# Patient Record
Sex: FEMALE | Race: BLACK OR AFRICAN AMERICAN | NOT HISPANIC OR LATINO | Employment: OTHER | URBAN - METROPOLITAN AREA
[De-identification: names, ages, dates, MRNs, and addresses within clinical notes are randomized per-mention and may not be internally consistent; named-entity substitution may affect disease eponyms.]

---

## 2017-01-04 ENCOUNTER — CLINICAL SUPPORT (OUTPATIENT)
Dept: REHABILITATION | Facility: OTHER | Age: 77
End: 2017-01-04
Attending: PHYSICAL MEDICINE & REHABILITATION
Payer: MEDICARE

## 2017-01-04 DIAGNOSIS — M51.36 DDD (DEGENERATIVE DISC DISEASE), LUMBAR: Primary | ICD-10-CM

## 2017-01-04 PROCEDURE — 97110 THERAPEUTIC EXERCISES: CPT | Performed by: PHYSICAL THERAPIST

## 2017-01-04 PROCEDURE — G8978 MOBILITY CURRENT STATUS: HCPCS | Mod: CK | Performed by: PHYSICAL THERAPIST

## 2017-01-04 PROCEDURE — G8979 MOBILITY GOAL STATUS: HCPCS | Mod: CJ | Performed by: PHYSICAL THERAPIST

## 2017-01-04 NOTE — PROGRESS NOTES
"[facility]   LUMBAR TREATMENT NOTE     Visit #:10 [facility] IM testing  Date: 2017     Time: 5250-7160 am  Precautions: L TKA (), Hx CA L face, CTS Mynor, gout    Pattern: Pattern 1 , no preference    Eval date: 10/25/16  Reassessment due: 16, done 16  Reassessment due: 16, done 17  Next reassessment due: 17    POC signed....10/25/16  Next POC due...17    PT/PTA face to face conference: 10/27/16 done  Conference due: 16      Subjective     Pt presents today with c/o lower back pain 3-4/10 prior to session. She is doing better after a bad week before the holidays after attending a  and having to walk a lot. Pt reported improved symptoms with repeated standing and prone extensions. She is compliant with her HEP, but not icing at home. Pt bought a theraball.  Good tolerance to [facility] Testing today.    Recent or major surgery: none  Imaging: see EPIC  Work: retired  Leisure: walking, working in yard (lives alone but has neighbors and nieces to assist if needed)         History       Pattern of pain questions: Pattern 1    1.  Where is your pain the worst? back  2.  Is your pain constant or intermittent? Constant, varies in intensity  3.  Does bending forward make your typical pain worse? No, just increases "stretch"  4.  Since the start of your back pain, has there been a change in your bowel or bladder? no  5.  What can't you do now that you use to be able to do? Walking, HHCs, yardwork, working out      Objective     No environmental, cultural, spiritual, developmental or education needs expressed or noted    POSTURE at eval:  Sitting: slump sitting with FHP, rounded shoulders and increased C-curve in LSP  Standing: increased thoracic kyphosis, slight decrease in Lumbar lordosis, no shift    Correction of posture: slimline  Relevant: yes        MOVEMENT LOSS 17:  Flexion:  WFL (fingertips to near ankle) IMP  Extension: limited  "   Pain: no,   Sidebending RIGHT: WFL      Pain: no  Sidebending LEFT:  WFL     Pain: no  Rotation RIGHT: WFL  Pain: no  Rotation LEFT: WFL  Pain: no    Repeated standing extensions: improved symptoms  Repeated prone extensions: slight pain, improved symptoms after decreasing ROM    Baseline IM Testing Results: 10/25/16  ROM: 0-33  Max Peak Torque: 111  Min Peak Torque: 21   Flex/ext ratio: 5.29:1    [facility] Testing Results: 1/4/17  ROM: 0-36 deg  MAx Peak Torque: 204 ftlbs  Min Peak Torque: 73 ftlbs  Flex Ext ratio:2.7:1 (imp)  Avg Change Sum of Forces: 171%       Treatment     Pt was instructed in and performed the following:  Cardiovascular exercise and therapeutic exercise to improve posture, lumbar spine and supporting musculature ROM, strength, and muscular endurance as follows:    [facility] 1/4/2017   Visit Number 10   VAS Pain Rating 4   Recumbent Bike Seat Pos. 19   Time 10   Extension in Lying 10   Extension in Standing 10   Flexion in Lying 10   Lumbar Flexion 36   Lumbar Extension 0   Lumbar Peak Torque 204   Ice - Z Lie (in min.) 10         Peripheral muscle strengthening which included 1 set of 15-20 repetitions at a slow, controlled 7 second per rep pace focused on strengthening supporting musculature for improved body mechanics and functional mobility.  Pt and therapist focused on proper form during treatment to ensure optimal strengthening of each targeted muscle group.  Machines were utilized including torso rotation, leg extension, leg curl, chest press, upright row, triceps extension, biceps curls, leg press and hip abduction.     11/1/16  Tx exercises performed today: LTR, DKTC/MOUSTAPHA, SKTC, Supine pelvic tilts, Standing Pelvic tilts - 10x ea    11/17/16:  10 reps each LTR, PPT, and DKTC on ball    HEP started as follows:   LTR, SKTC, Supine pelvic tilts - 10x ea set, 2-3 sets per day  Pt educated on HEP and activity modifications to reduce c/o pain and improve overall function. Pt  also educated on use of modalities prn to reduce c/o pain, dysfunction.    11/29/16:  Standing lumbar extensions 10x, 3x daily  Prone lumbar extensions 10x, 2xdaily      (patient has handouts and demonstrated and expressed understanding of the following)      Assessment       Pt present for her 10th visit today.  Pt presents today with c/o lower back pain  Pt reassessed and demonstrates improved lumbar ROM in flexion and extension, improved exercise tolerance, and Pt reported improved symptoms with repeated standing and prone extensions. She performed flexion stretches, lower trunk rotation and pelvic tilts all on the ball demonstrating good technique, with minimal vc's to decrease speed of performance and intensity of exercise to pain-free ROM.  Pt tolerated [facility] IM testing today with excellent increase in ROM and strength.  Pt completed all peripheral resistance exercises with no reports of increased symptoms or discomfort. Pt is progressing well towards treatment goals and would benefit from continued skilled physical therapy treatment.     PT diagnosis: chronic low back pain 2* DDD    Pattern: Pattern 1    Medical necessity is demonstrated by the following problem list.  Pt presents with the following impairments: decreased trunk and hip strength, decreased trunk ROM, decreased postural awareness and endurance, decreased NM control/coordination. Current impairments limits patient with all functional activities. Patient requires skilled PT to address remaining deficits and return patient to [facility]. Based on the above history, physical examination, and baseline IM testing, an active physical therapy program is recommended.      Prognosis is: Good    GOALS: Pt is in agreement with the following goals.    Short term goals: (5 weeks or 10 visits)  1.  Pt will demonstrate increased lumbar ROM measured by med ex by at least 3 degrees from the initial ROM value with improvements noted in functional ROM and ability to  perform ADLs MET  2.  Pt will demonstrate increased maximum isometric torque value by 5% when compared to the initial value MET  3.  Pt will tolerate regular 5% increases in dynamic weight loads on all machines MET  4.  Patient report a reduction in worst pain score by 1-2 points for improved tolerance during work and recreational activities MET  5.  Pt able to perform HEP correctly with minimal cueing or supervision for therapist MET  6. Pt will demonstrate improvement in flexion/extension strength ratio compared in initial value MET    Long term goals: (10 weeks or 20 visits)  1. Pt will demonstrate increased lumbar ROM by at least 6 degrees from initial ROM value, resulting in improved ability to perform functional fwd bending while standing and sitting.    2. Pt will demonstrate increased maximum isometric torque value by 10% when compared to the initial value, resulting in improved ability to perform bending, lifting, and carrying activities safely, confidently, and 2/10 pain or less.   3. Pt will be able to ambulate community distances safely, confidently, and 2/10 pain or less.  4. Pt to demonstrate ability to independently control and reduce their pain through posture positioning and mechanical movements throughout typical work day.  5. Pt able to sleep through the night without waking with c/o pain.   6. Pt able to perform household cooking/cleaning ADLS safely, confidently, and 2/10 pain or less.  7. Pt to be able to perform self care and grooming ADLs safely, confidently, independently, and 2/10 pain or less.   8. Pt able to resume their preferred exercise regimen safely, confidently, and 2/10 pain or less.    9. Pt will be able to ascend/descend 1 flight of stairs reciprocally with use of unilateral handrail for safety, confidently and 2/10 pain or less.      Additional Specific patient expressed goals:  1. HHCs, Yardwork  2. Walking    OUTCOMES:  Functional Outcome Measure: FOTO limitation = 60%  Disability  G-codes + Modifier + % Impairment: (Mobility):   Initial =  (60% - 80% disability), Goal =  (20% - 40% disability)  [facility]:FOTO: 51% limitation      Plan     Continue per POC.

## 2017-01-06 ENCOUNTER — CLINICAL SUPPORT (OUTPATIENT)
Dept: REHABILITATION | Facility: OTHER | Age: 77
End: 2017-01-06
Attending: PHYSICAL MEDICINE & REHABILITATION
Payer: MEDICARE

## 2017-01-06 DIAGNOSIS — M51.36 DDD (DEGENERATIVE DISC DISEASE), LUMBAR: Primary | ICD-10-CM

## 2017-01-06 PROCEDURE — 97110 THERAPEUTIC EXERCISES: CPT

## 2017-01-06 NOTE — PROGRESS NOTES
"[facility]   LUMBAR TREATMENT NOTE vist 11      Date: 1/6/2017    Time: 11:00-12:00 am  Precautions: L TKA (2014), Hx CA L face, CTS Mynor, gout    Pattern: Pattern 1 , no preference    Eval date: 10/25/16  Reassessment due: 11/25/16, done 11/29/16  Reassessment due: 12/29/16, done 1/4/17  Next reassessment due: 2/4/17    POC signed....10/25/16  Next POC due...01/25/17    PT/PTA face to face conference: 10/27/16 done  Conference due: 11/17/16      Subjective     Pt presents today with no c/o LBP. 0/10 prior to session.  Pt reported improved symptoms with repeated standing and prone extensions. She is compliant with her HEP, but not icing at home. Pt bought a theraball.      Recent or major surgery: none  Imaging: see EPIC  Work: retired  Leisure: walking, working in yard (lives alone but has neighbors and nieces to assist if needed)         History       Pattern of pain questions: Pattern 1    1.  Where is your pain the worst? back  2.  Is your pain constant or intermittent? Constant, varies in intensity  3.  Does bending forward make your typical pain worse? No, just increases "stretch"  4.  Since the start of your back pain, has there been a change in your bowel or bladder? no  5.  What can't you do now that you use to be able to do? Walking, HHCs, yardwork, working out      Objective     No environmental, cultural, spiritual, developmental or education needs expressed or noted    POSTURE at eval:  Sitting: slump sitting with FHP, rounded shoulders and increased C-curve in LSP  Standing: increased thoracic kyphosis, slight decrease in Lumbar lordosis, no shift    Correction of posture: slimline  Relevant: yes        MOVEMENT LOSS 1/4/17:  Flexion:  WFL (fingertips to near ankle) IMP  Extension: limited    Pain: no,   Sidebending RIGHT: WFL      Pain: no  Sidebending LEFT:  WFL     Pain: no  Rotation RIGHT: WFL  Pain: no  Rotation LEFT: WFL  Pain: no    Repeated standing extensions: " improved symptoms  Repeated prone extensions: slight pain, improved symptoms after decreasing ROM    Baseline IM Testing Results: 10/25/16  ROM: 0-33  Max Peak Torque: 111  Min Peak Torque: 21   Flex/ext ratio: 5.29:1    [facility] Testing Results: 1/4/17  ROM: 0-36 deg  MAx Peak Torque: 204 ftlbs  Min Peak Torque: 73 ftlbs  Flex Ext ratio:2.7:1 (imp)  Avg Change Sum of Forces: 171%       Treatment     Pt was instructed in and performed the following:  Cardiovascular exercise and therapeutic exercise to improve posture, lumbar spine and supporting musculature ROM, strength, and muscular endurance as follows:    [facility] 1/6/2017   Visit Number 11   VAS Pain Rating 0   Recumbent Bike Seat Pos. 19   Time 10   Extension in Lying 10   Extension in Standing 10   Flexion in Lying 10   Lumbar Weight 64   Repetitions 20   Rating of Perceived Exertion 3   Ice - Z Lie (in min.) 10         Peripheral muscle strengthening which included 1 set of 15-20 repetitions at a slow, controlled 7 second per rep pace focused on strengthening supporting musculature for improved body mechanics and functional mobility.  Pt and therapist focused on proper form during treatment to ensure optimal strengthening of each targeted muscle group.  Machines were utilized including torso rotation, leg extension, leg curl, chest press, upright row, triceps extension, biceps curls, leg press and hip abduction.     11/1/16  Tx exercises performed today: LTR, DKTC/MOUSTAPHA, SKTC, Supine pelvic tilts, Standing Pelvic tilts - 10x ea    11/17/16:  10 reps each LTR, PPT, and DKTC on ball    HEP started as follows:   LTR, SKTC, Supine pelvic tilts - 10x ea set, 2-3 sets per day  Pt educated on HEP and activity modifications to reduce c/o pain and improve overall function. Pt also educated on use of modalities prn to reduce c/o pain, dysfunction.    11/29/16:  Standing lumbar extensions 10x, 3x daily  Prone lumbar extensions 10x, 2xdaily      (patient  has handouts and demonstrated and expressed understanding of the following)      Assessment       Pt present for her 11th visit today.  Pt presents today with c/o lower back pain. She performed flexion stretches, lower trunk rotation and pelvic tilts all on the ball demonstrating good technique, with minimal vc's to decrease speed of performance and intensity of exercise to pain-free ROM. Pt tolerated lumbar medx machine a  weight ^ with no c/o pain. Pt completed all peripheral resistance exercises with no reports of increased symptoms or discomfort. Pt is progressing well towards treatment goals and would benefit from continued skilled physical therapy treatment.     PT diagnosis: chronic low back pain 2* DDD    Pattern: Pattern 1    Medical necessity is demonstrated by the following problem list.  Pt presents with the following impairments: decreased trunk and hip strength, decreased trunk ROM, decreased postural awareness and endurance, decreased NM control/coordination. Current impairments limits patient with all functional activities. Patient requires skilled PT to address remaining deficits and return patient to [facility]. Based on the above history, physical examination, and baseline IM testing, an active physical therapy program is recommended.      Prognosis is: Good    GOALS: Pt is in agreement with the following goals.    Short term goals: (5 weeks or 10 visits)  1.  Pt will demonstrate increased lumbar ROM measured by med ex by at least 3 degrees from the initial ROM value with improvements noted in functional ROM and ability to perform ADLs MET  2.  Pt will demonstrate increased maximum isometric torque value by 5% when compared to the initial value MET  3.  Pt will tolerate regular 5% increases in dynamic weight loads on all machines MET  4.  Patient report a reduction in worst pain score by 1-2 points for improved tolerance during work and recreational activities MET  5.  Pt able to perform HEP correctly  with minimal cueing or supervision for therapist MET  6. Pt will demonstrate improvement in flexion/extension strength ratio compared in initial value MET    Long term goals: (10 weeks or 20 visits)  1. Pt will demonstrate increased lumbar ROM by at least 6 degrees from initial ROM value, resulting in improved ability to perform functional fwd bending while standing and sitting.    2. Pt will demonstrate increased maximum isometric torque value by 10% when compared to the initial value, resulting in improved ability to perform bending, lifting, and carrying activities safely, confidently, and 2/10 pain or less.   3. Pt will be able to ambulate community distances safely, confidently, and 2/10 pain or less.  4. Pt to demonstrate ability to independently control and reduce their pain through posture positioning and mechanical movements throughout typical work day.  5. Pt able to sleep through the night without waking with c/o pain.   6. Pt able to perform household cooking/cleaning ADLS safely, confidently, and 2/10 pain or less.  7. Pt to be able to perform self care and grooming ADLs safely, confidently, independently, and 2/10 pain or less.   8. Pt able to resume their preferred exercise regimen safely, confidently, and 2/10 pain or less.    9. Pt will be able to ascend/descend 1 flight of stairs reciprocally with use of unilateral handrail for safety, confidently and 2/10 pain or less.      Additional Specific patient expressed goals:  1. HHCs, Yardwork  2. Walking    OUTCOMES:  Functional Outcome Measure: FOTO limitation = 60% Disability  G-codes + Modifier + % Impairment: (Mobility):   Initial =  (60% - 80% disability), Goal =  (20% - 40% disability)  [facility]:FOTO: 51% limitation      Plan     Continue per POC.

## 2017-01-10 ENCOUNTER — CLINICAL SUPPORT (OUTPATIENT)
Dept: REHABILITATION | Facility: OTHER | Age: 77
End: 2017-01-10
Attending: PHYSICAL MEDICINE & REHABILITATION
Payer: MEDICARE

## 2017-01-10 DIAGNOSIS — M51.36 DDD (DEGENERATIVE DISC DISEASE), LUMBAR: Primary | ICD-10-CM

## 2017-01-10 PROCEDURE — 97110 THERAPEUTIC EXERCISES: CPT

## 2017-01-10 NOTE — PROGRESS NOTES
"[facility]   LUMBAR TREATMENT NOTE vist 12      Date: 1/10/2017    Time: 10:30-11:30 am  Precautions: L TKA (2014), Hx CA L face, CTS Mynor, gout    Pattern: Pattern 1 , no preference    Eval date: 10/25/16  Reassessment due: 11/25/16, done 11/29/16  Reassessment due: 12/29/16, done 1/4/17  Next reassessment due: 2/4/17    POC signed....10/25/16  Next POC due...01/25/17    PT/PTA face to face conference: 10/27/16 done  Conference due: 11/17/16 Pt seen by a PT with the session. 1/10/17 done  Conference due: 2/10/17      Subjective     Pt presents today with 5/10 LBP prior to session.   Pt reported improved symptoms with repeated standing and prone extensions, but occassionally still gets LB pain with walking.  She is compliant with her HEP, but not icing at home. Pt bought a theraball.       Recent or major surgery: none  Imaging: see EPIC  Work: retired  Leisure: walking, working in yard (lives alone but has neighbors and nieces to assist if needed)                        History       Pattern of pain questions: Pattern 1    1.  Where is your pain the worst? back  2.  Is your pain constant or intermittent? Constant, varies in intensity  3.  Does bending forward make your typical pain worse? No, just increases "stretch"  4.  Since the start of your back pain, has there been a change in your bowel or bladder? no  5.  What can't you do now that you use to be able to do? Walking, HHCs, yardwork, working out      Objective     No environmental, cultural, spiritual, developmental or education needs expressed or noted    POSTURE at eval:  Sitting: slump sitting with FHP, rounded shoulders and increased C-curve in LSP  Standing: increased thoracic kyphosis, slight decrease in Lumbar lordosis, no shift    Correction of posture: slimline  Relevant: yes        MOVEMENT LOSS 1/4/17:  Flexion:  WFL (fingertips to near ankle) IMP  Extension: limited    Pain: no,   Sidebending RIGHT: WFL      Pain: " no  Sidebending LEFT:  WFL     Pain: no  Rotation RIGHT: WFL  Pain: no  Rotation LEFT: WFL  Pain: no    Repeated standing extensions: improved symptoms  Repeated prone extensions: slight pain, improved symptoms after decreasing ROM    Baseline IM Testing Results: 10/25/16  ROM: 0-33  Max Peak Torque: 111  Min Peak Torque: 21   Flex/ext ratio: 5.29:1    [facility] Testing Results: 1/4/17  ROM: 0-36 deg  MAx Peak Torque: 204 ftlbs  Min Peak Torque: 73 ftlbs  Flex Ext ratio:2.7:1 (imp)  Avg Change Sum of Forces: 171%       Treatment     Pt was instructed in and performed the following:  Cardiovascular exercise and therapeutic exercise to improve posture, lumbar spine and supporting musculature ROM, strength, and muscular endurance as follows:    [facility] 1/10/2017   Visit Number 12   VAS Pain Rating 5   Recumbent Bike Seat Pos. 19   Time 10   Extension in Lying 10   Extension in Standing 10   Flexion in Lying 10   Lumbar Weight 67   Repetitions 15   Rating of Perceived Exertion 5   Ice - Z Lie (in min.) 10         Peripheral muscle strengthening which included 1 set of 15-20 repetitions at a slow, controlled 7 second per rep pace focused on strengthening supporting musculature for improved body mechanics and functional mobility.  Pt and therapist focused on proper form during treatment to ensure optimal strengthening of each targeted muscle group.  Machines were utilized including torso rotation, leg extension, leg curl, chest press, upright row, triceps extension, biceps curls, leg press and hip abduction.     11/1/16  Tx exercises performed today: LTR, DKTC/MOUSTAPHA, SKTC, Supine pelvic tilts, Standing Pelvic tilts - 10x ea    11/17/16:  10 reps each LTR, PPT, and DKTC on ball    HEP started as follows:   LTR, SKTC, Supine pelvic tilts - 10x ea set, 2-3 sets per day  Pt educated on HEP and activity modifications to reduce c/o pain and improve overall function. Pt also educated on use of modalities prn to  reduce c/o pain, dysfunction.    11/29/16:  Standing lumbar extensions 10x, 3x daily  Prone lumbar extensions 10x, 2xdaily      (patient has handouts and demonstrated and expressed understanding of the following)      Assessment       Pt present for her 12th visit today. Pt presents today with 5/10 LBP prior to session that decreased during and post session.   Pt reported improved symptoms with repeated standing and prone extensions, but occassionally still gets LB pain with walking. She knows seated stretch and educated her when having having pain with walking to sit and do seated trunk flexion. She understood and will and will report to us the affect.   She is compliant with her HEP, but not icing at home. Pt bought a theraball.   She performed flexion stretches, lower trunk rotation and pelvic tilts all on the ball demonstrating good technique, with minimal vc's to decrease speed of performance and intensity of exercise to pain-free ROM. Pt tolerated lumbar medx machine a  weight ^ with no c/o pain. Pt completed all peripheral resistance exercises with no reports of increased symptoms or discomfort. Pt is progressing well towards treatment goals and would benefit from continued skilled physical therapy treatment.     PT diagnosis: chronic low back pain 2* DDD    Pattern: Pattern 1    Medical necessity is demonstrated by the following problem list.  Pt presents with the following impairments: decreased trunk and hip strength, decreased trunk ROM, decreased postural awareness and endurance, decreased NM control/coordination. Current impairments limits patient with all functional activities. Patient requires skilled PT to address remaining deficits and return patient to [facility]. Based on the above history, physical examination, and baseline IM testing, an active physical therapy program is recommended.      Prognosis is: Good    GOALS: Pt is in agreement with the following goals.    Short term goals: (5 weeks or 10  visits)  1.  Pt will demonstrate increased lumbar ROM measured by med ex by at least 3 degrees from the initial ROM value with improvements noted in functional ROM and ability to perform ADLs MET  2.  Pt will demonstrate increased maximum isometric torque value by 5% when compared to the initial value MET  3.  Pt will tolerate regular 5% increases in dynamic weight loads on all machines MET  4.  Patient report a reduction in worst pain score by 1-2 points for improved tolerance during work and recreational activities MET  5.  Pt able to perform HEP correctly with minimal cueing or supervision for therapist MET  6. Pt will demonstrate improvement in flexion/extension strength ratio compared in initial value MET    Long term goals: (10 weeks or 20 visits)  1. Pt will demonstrate increased lumbar ROM by at least 6 degrees from initial ROM value, resulting in improved ability to perform functional fwd bending while standing and sitting.    2. Pt will demonstrate increased maximum isometric torque value by 10% when compared to the initial value, resulting in improved ability to perform bending, lifting, and carrying activities safely, confidently, and 2/10 pain or less.   3. Pt will be able to ambulate community distances safely, confidently, and 2/10 pain or less.  4. Pt to demonstrate ability to independently control and reduce their pain through posture positioning and mechanical movements throughout typical work day.  5. Pt able to sleep through the night without waking with c/o pain.   6. Pt able to perform household cooking/cleaning ADLS safely, confidently, and 2/10 pain or less.  7. Pt to be able to perform self care and grooming ADLs safely, confidently, independently, and 2/10 pain or less.   8. Pt able to resume their preferred exercise regimen safely, confidently, and 2/10 pain or less.    9. Pt will be able to ascend/descend 1 flight of stairs reciprocally with use of unilateral handrail for safety,  confidently and 2/10 pain or less.      Additional Specific patient expressed goals:  1. HHCs, Yardwork  2. Walking    OUTCOMES:  Functional Outcome Measure: FOTO limitation = 60% Disability  G-codes + Modifier + % Impairment: (Mobility):   Initial =  (60% - 80% disability), Goal =  (20% - 40% disability)  [facility]:FOTO: 51% limitation      Plan     Continue per POC.

## 2017-01-13 ENCOUNTER — TELEPHONE (OUTPATIENT)
Dept: INTERNAL MEDICINE | Facility: CLINIC | Age: 77
End: 2017-01-13

## 2017-01-13 NOTE — TELEPHONE ENCOUNTER
----- Message from Aviva Bryant sent at 1/13/2017  9:34 AM CST -----  Contact: Self/ [phone]   Pt want to know if the doctor can send some medication to the pharmacy for her sinus. Pt stated she has sinus so bad. Please call and advise       Thank you

## 2017-01-13 NOTE — TELEPHONE ENCOUNTER
----- Message from Javier Keller sent at 1/13/2017  3:42 PM CST -----  Contact: self [phone]  Patient  Is returning a call from office . Please  advise,. Thanks !

## 2017-01-14 ENCOUNTER — OFFICE VISIT (OUTPATIENT)
Dept: INTERNAL MEDICINE | Facility: CLINIC | Age: 77
End: 2017-01-14
Payer: MEDICARE

## 2017-01-14 VITALS
BODY MASS INDEX: 35.92 KG/M2 | HEART RATE: 71 BPM | TEMPERATURE: 98 F | DIASTOLIC BLOOD PRESSURE: 60 MMHG | SYSTOLIC BLOOD PRESSURE: 100 MMHG | HEIGHT: 68 IN | OXYGEN SATURATION: 98 % | WEIGHT: 237 LBS

## 2017-01-14 DIAGNOSIS — R09.81 SINUS CONGESTION: ICD-10-CM

## 2017-01-14 DIAGNOSIS — L29.8 NASAL ITCHING: ICD-10-CM

## 2017-01-14 DIAGNOSIS — J32.9 SINUSITIS, UNSPECIFIED CHRONICITY, UNSPECIFIED LOCATION: Primary | ICD-10-CM

## 2017-01-14 PROCEDURE — 99213 OFFICE O/P EST LOW 20 MIN: CPT | Mod: S$GLB,,, | Performed by: FAMILY MEDICINE

## 2017-01-14 PROCEDURE — 3078F DIAST BP <80 MM HG: CPT | Mod: S$GLB,,, | Performed by: FAMILY MEDICINE

## 2017-01-14 PROCEDURE — 1160F RVW MEDS BY RX/DR IN RCRD: CPT | Mod: S$GLB,,, | Performed by: FAMILY MEDICINE

## 2017-01-14 PROCEDURE — 99999 PR PBB SHADOW E&M-EST. PATIENT-LVL III: CPT | Mod: PBBFAC,,, | Performed by: FAMILY MEDICINE

## 2017-01-14 PROCEDURE — 1159F MED LIST DOCD IN RCRD: CPT | Mod: S$GLB,,, | Performed by: FAMILY MEDICINE

## 2017-01-14 PROCEDURE — 1125F AMNT PAIN NOTED PAIN PRSNT: CPT | Mod: S$GLB,,, | Performed by: FAMILY MEDICINE

## 2017-01-14 PROCEDURE — 1157F ADVNC CARE PLAN IN RCRD: CPT | Mod: S$GLB,,, | Performed by: FAMILY MEDICINE

## 2017-01-14 PROCEDURE — 3074F SYST BP LT 130 MM HG: CPT | Mod: S$GLB,,, | Performed by: FAMILY MEDICINE

## 2017-01-14 RX ORDER — DOXYCYCLINE 100 MG/1
100 CAPSULE ORAL 2 TIMES DAILY
Qty: 20 CAPSULE | Refills: 0 | Status: SHIPPED | OUTPATIENT
Start: 2017-01-14 | End: 2017-01-24

## 2017-01-14 RX ORDER — PROMETHAZINE HYDROCHLORIDE 6.25 MG/5ML
6.25 SYRUP ORAL EVERY 4 HOURS PRN
Qty: 118 ML | Refills: 0 | Status: SHIPPED | OUTPATIENT
Start: 2017-01-14 | End: 2017-08-18

## 2017-01-14 NOTE — PROGRESS NOTES
S/  Sat UC clinic. PCP Bart  Pt c/o 2 days of nasal and sinus itching, and sinus tenderness. She says it has gotten very bad and would like treatment for it.    O/  Vitals:    01/14/17 1053   BP: 100/60   Pulse: 71   Temp: 98.3 °F (36.8 °C)     HEENT  +++sinus tenderness on palpation  Pharynx - difficult to visualize, part I can see looks wnl  Neck supple, no lyphadenopathy  Cor s1s2  Lungs CTA  Abd, benign, obese    Henrietta was seen today for nasal congestion and sore throat.    Diagnoses and all orders for this visit:    Sinusitis, unspecified chronicity, unspecified location  -     doxycycline (MONODOX) 100 MG capsule; Take 1 capsule (100 mg total) by mouth 2 (two) times daily.    Nasal itching  -     promethazine (PHENERGAN) 6.25 mg/5 mL syrup; Take 5 mLs (6.25 mg total) by mouth every 4 (four) hours as needed (itching & congestion).    Sinus congestion  -     promethazine (PHENERGAN) 6.25 mg/5 mL syrup; Take 5 mLs (6.25 mg total) by mouth every 4 (four) hours as needed (itching & congestion).    F/u prn

## 2017-01-23 ENCOUNTER — CLINICAL SUPPORT (OUTPATIENT)
Dept: REHABILITATION | Facility: OTHER | Age: 77
End: 2017-01-23
Attending: PHYSICAL MEDICINE & REHABILITATION
Payer: MEDICARE

## 2017-01-23 DIAGNOSIS — M51.36 DDD (DEGENERATIVE DISC DISEASE), LUMBAR: Primary | ICD-10-CM

## 2017-01-23 PROCEDURE — 97110 THERAPEUTIC EXERCISES: CPT

## 2017-01-23 NOTE — PROGRESS NOTES
"[facility]   LUMBAR TREATMENT NOTE vist 13      Date: 1/23/2017    Time: 1:10-2:10 am  Precautions: L TKA (2014), Hx CA L face, CTS [place], gout    Pattern: Pattern 1 , no preference    Eval date: 10/25/16  Reassessment due: 11/25/16, done 11/29/16  Reassessment due: 12/29/16, done 1/4/17  Next reassessment due: 2/4/17    POC signed....10/25/16  Next POC due...01/25/17    PT/PTA face to face conference: 10/27/16 done  Conference due: 11/17/16 Pt seen by a PT with the session. 1/10/17 done  Conference due: 2/10/17      Subjective     Pt presents today with 5/10 LBP prior to session. She has been absent from PT secondary to a severe sinus infection. She did not perform HEP exercises on doctor's orders to strict bed rest for up to 3 days.  She is eager to restart treatment.     Work: retired  Leisure: walking, working in yard (lives alone but has neighbors and nieces to assist if needed)  Equipment: [employer]                      History       Pattern of pain questions: Pattern 1    1.  Where is your pain the worst? back  2.  Is your pain constant or intermittent? Constant, varies in intensity  3.  Does bending forward make your typical pain worse? No, just increases "stretch"  4.  Since the start of your back pain, has there been a change in your bowel or bladder? no  5.  What can't you do now that you use to be able to do? Walking, HHCs, yardwork, working out      Objective     No environmental, cultural, spiritual, developmental or education needs expressed or noted    POSTURE at eval:  Sitting: slump sitting with FHP, rounded shoulders and increased C-curve in LSP  Standing: increased thoracic kyphosis, slight decrease in Lumbar lordosis, no shift    Correction of posture: slimline  Relevant: yes        MOVEMENT LOSS 1/4/17:  Flexion:  WFL (fingertips to near ankle) IMP  Extension: limited    Pain: no,   Sidebending RIGHT: WFL      Pain: no  Sidebending LEFT:  WFL     Pain: no  Rotation " RIGHT: WFL  Pain: no  Rotation LEFT: WFL  Pain: no    Repeated standing extensions: improved symptoms  Repeated prone extensions: slight pain, improved symptoms after decreasing ROM    Baseline IM Testing Results: 10/25/16  ROM: 0-33  Max Peak Torque: 111  Min Peak Torque: 21   Flex/ext ratio: 5.29:1    [facility] [place] IM Testing Results: 1/4/17  ROM: 0-36 deg  MAx Peak Torque: 204 ftlbs  Min Peak Torque: 73 ftlbs  Flex Ext ratio:2.7:1 (imp)  Avg Change Sum of Forces: 171%       Treatment     Pt was instructed in and performed the following:  Cardiovascular exercise and therapeutic exercise to improve posture, lumbar spine and supporting musculature ROM, strength, and muscular endurance as follows:    [facility] 1/23/2017   Visit Number 13   VAS Pain Rating 5   Recumbent Bike Seat Pos. 19   Time 10   Extension in Lying 10   Extension in Standing 10   Flexion in Lying 10   Lumbar Flexion -   Lumbar Extension -   Lumbar Peak Torque -   Lumbar Weight 67   Repetitions 21   Rating of Perceived Exertion 3   Ice - Z Lie (in min.) 10             Peripheral muscle strengthening which included 1 set of 15-20 repetitions at a slow, controlled 7 second per rep pace focused on strengthening supporting musculature for improved body mechanics and functional mobility.  Pt and therapist focused on proper form during treatment to ensure optimal strengthening of each targeted muscle group.  Machines were utilized including torso rotation, leg extension, leg curl, chest press, upright row, triceps extension, biceps curls, leg press and hip abduction.     11/1/16  Tx exercises performed today: LTR, DKTC/MOUSTAPHA, SKTC, Supine pelvic tilts, Standing Pelvic tilts - 10x ea    11/17/16:  10 reps each LTR, PPT, and DKTC on ball    HEP started as follows:   LTR, SKTC, Supine pelvic tilts - 10x ea set, 2-3 sets per day  Pt educated on HEP and activity modifications to reduce c/o pain and improve overall function. Pt also educated on use of  modalities prn to reduce c/o pain, dysfunction.    11/29/16:  Standing lumbar extensions 10x, 3x daily  Prone lumbar extensions 10x, 2xdaily      (patient has handouts and demonstrated and expressed understanding of the following)      Assessment       Pt present for her 13th visit today. Pt presents today with 5/10 LBP prior to session that decreased during and post session.  She has been absent from PT secondary to a severe sinus infection. She reports she did not perform HEP exercises while sick on doctor's orders. She required moderate cueing for recall and performance of HEP exercises, with minimal vc's to decrease speed of performance and intensity of exercise to pain-free ROM. Pt tolerated lumbar medx machine at same weight with no c/o pain. Pt completed all peripheral resistance exercises with no reports of increased symptoms or discomfort. Pt is progressing well towards treatment goals and would benefit from continued skilled physical therapy treatment.     PT diagnosis: chronic low back pain 2* DDD    Pattern: Pattern 1    Medical necessity is demonstrated by the following problem list.  Pt presents with the following impairments: decreased trunk and hip strength, decreased trunk ROM, decreased postural awareness and endurance, decreased NM control/coordination. Current impairments limits patient with all functional activities. Patient requires skilled PT to address remaining deficits and return patient to [facility]. Based on the above history, physical examination, and baseline IM testing, an active physical therapy program is recommended.      Prognosis is: Good    GOALS: Pt is in agreement with the following goals.    Short term goals: (5 weeks or 10 visits)  1.  Pt will demonstrate increased lumbar ROM measured by med ex by at least 3 degrees from the initial ROM value with improvements noted in functional ROM and ability to perform ADLs MET  2.  Pt will demonstrate increased maximum isometric torque value  by 5% when compared to the initial value MET  3.  Pt will tolerate regular 5% increases in dynamic weight loads on all machines MET  4.  Patient report a reduction in worst pain score by 1-2 points for improved tolerance during work and recreational activities MET  5.  Pt able to perform HEP correctly with minimal cueing or supervision for therapist MET  6. Pt will demonstrate improvement in flexion/extension strength ratio compared in initial value MET    Long term goals: (10 weeks or 20 visits)  1. Pt will demonstrate increased lumbar ROM by at least 6 degrees from initial ROM value, resulting in improved ability to perform functional fwd bending while standing and sitting.    2. Pt will demonstrate increased maximum isometric torque value by 10% when compared to the initial value, resulting in improved ability to perform bending, lifting, and carrying activities safely, confidently, and 2/10 pain or less.   3. Pt will be able to ambulate community distances safely, confidently, and 2/10 pain or less.  4. Pt to demonstrate ability to independently control and reduce their pain through posture positioning and mechanical movements throughout typical work day.  5. Pt able to sleep through the night without waking with c/o pain.   6. Pt able to perform household cooking/cleaning ADLS safely, confidently, and 2/10 pain or less.  7. Pt to be able to perform self care and grooming ADLs safely, confidently, independently, and 2/10 pain or less.   8. Pt able to resume their preferred exercise regimen safely, confidently, and 2/10 pain or less.    9. Pt will be able to ascend/descend 1 flight of stairs reciprocally with use of unilateral handrail for safety, confidently and 2/10 pain or less.      Additional Specific patient expressed goals:  1. HHCs, Yardwork  2. Walking    OUTCOMES:  Functional Outcome Measure: FOTO limitation = 60% Disability  G-codes + Modifier + % Impairment: (Mobility):   Initial =  (60% - 80%  disability), Goal =  (20% - 40% disability)  [place]:FOTO: 51% limitation      Plan     Continue per POC.

## 2017-01-26 ENCOUNTER — CLINICAL SUPPORT (OUTPATIENT)
Dept: REHABILITATION | Facility: OTHER | Age: 77
End: 2017-01-26
Attending: PHYSICAL MEDICINE & REHABILITATION
Payer: MEDICARE

## 2017-01-26 DIAGNOSIS — M51.36 DDD (DEGENERATIVE DISC DISEASE), LUMBAR: Primary | ICD-10-CM

## 2017-01-26 PROCEDURE — 97110 THERAPEUTIC EXERCISES: CPT

## 2017-01-31 ENCOUNTER — OFFICE VISIT (OUTPATIENT)
Dept: SPINE | Facility: CLINIC | Age: 77
End: 2017-01-31
Attending: PHYSICAL MEDICINE & REHABILITATION
Payer: MEDICARE

## 2017-01-31 ENCOUNTER — CLINICAL SUPPORT (OUTPATIENT)
Dept: REHABILITATION | Facility: OTHER | Age: 77
End: 2017-01-31
Attending: PHYSICAL MEDICINE & REHABILITATION
Payer: MEDICARE

## 2017-01-31 VITALS
HEIGHT: 68 IN | DIASTOLIC BLOOD PRESSURE: 81 MMHG | HEART RATE: 81 BPM | SYSTOLIC BLOOD PRESSURE: 137 MMHG | WEIGHT: 237 LBS | BODY MASS INDEX: 35.92 KG/M2

## 2017-01-31 DIAGNOSIS — M51.36 DDD (DEGENERATIVE DISC DISEASE), LUMBAR: Primary | ICD-10-CM

## 2017-01-31 DIAGNOSIS — G89.29 CHRONIC BILATERAL LOW BACK PAIN WITHOUT SCIATICA: ICD-10-CM

## 2017-01-31 DIAGNOSIS — M62.838 MUSCLE SPASM: ICD-10-CM

## 2017-01-31 DIAGNOSIS — G56.03 BILATERAL CARPAL TUNNEL SYNDROME: ICD-10-CM

## 2017-01-31 DIAGNOSIS — M46.00 SPINAL ENTHESOPATHY: ICD-10-CM

## 2017-01-31 DIAGNOSIS — M54.50 CHRONIC BILATERAL LOW BACK PAIN WITHOUT SCIATICA: ICD-10-CM

## 2017-01-31 PROCEDURE — 1159F MED LIST DOCD IN RCRD: CPT | Mod: S$GLB,,, | Performed by: PHYSICAL MEDICINE & REHABILITATION

## 2017-01-31 PROCEDURE — 99214 OFFICE O/P EST MOD 30 MIN: CPT | Mod: S$GLB,,, | Performed by: PHYSICAL MEDICINE & REHABILITATION

## 2017-01-31 PROCEDURE — 99999 PR PBB SHADOW E&M-EST. PATIENT-LVL III: CPT | Mod: PBBFAC,,, | Performed by: PHYSICAL MEDICINE & REHABILITATION

## 2017-01-31 PROCEDURE — 1157F ADVNC CARE PLAN IN RCRD: CPT | Mod: S$GLB,,, | Performed by: PHYSICAL MEDICINE & REHABILITATION

## 2017-01-31 PROCEDURE — 3079F DIAST BP 80-89 MM HG: CPT | Mod: S$GLB,,, | Performed by: PHYSICAL MEDICINE & REHABILITATION

## 2017-01-31 PROCEDURE — 99499 UNLISTED E&M SERVICE: CPT | Mod: S$GLB,,, | Performed by: PHYSICAL MEDICINE & REHABILITATION

## 2017-01-31 PROCEDURE — 1160F RVW MEDS BY RX/DR IN RCRD: CPT | Mod: S$GLB,,, | Performed by: PHYSICAL MEDICINE & REHABILITATION

## 2017-01-31 PROCEDURE — 3075F SYST BP GE 130 - 139MM HG: CPT | Mod: S$GLB,,, | Performed by: PHYSICAL MEDICINE & REHABILITATION

## 2017-01-31 PROCEDURE — 97110 THERAPEUTIC EXERCISES: CPT | Performed by: PHYSICAL THERAPIST

## 2017-01-31 PROCEDURE — 1125F AMNT PAIN NOTED PAIN PRSNT: CPT | Mod: S$GLB,,, | Performed by: PHYSICAL MEDICINE & REHABILITATION

## 2017-01-31 RX ORDER — FLUTICASONE PROPIONATE 50 MCG
SPRAY, SUSPENSION (ML) NASAL
Refills: 6 | COMMUNITY
Start: 2017-01-16 | End: 2017-08-18

## 2017-01-31 NOTE — PLAN OF CARE
Pt present for her 15th visit today with an upset stomach, but glad she could make it.  Pt presents today with 0-3/10 LBP prior to and 0/10 after treatment.  She reports she did  perform HEP exercises more over the last few days. She required minimal cueing for recall and performance of HEP exercises, with minimal vc's to decrease speed of performance and intensity of exercise to pain-free ROM. Pt tolerated a weight at 70 ftlbs on the lumbar med x machine, completing 20 reps with an RPE of 3. Pt completed all peripheral resistance exercises with no reports of increased symptoms or discomfort. Pt is progressing well towards treatment goals and would benefit from continued skilled physical therapy treatment.     PT diagnosis: chronic low back pain 2* DDD    Pattern: Pattern 1    Medical necessity is demonstrated by the following problem list.  Pt presents with the following impairments: decreased trunk and hip strength, decreased trunk ROM, decreased postural awareness and endurance, decreased NM control/coordination. Current impairments limits patient with all functional activities. Patient requires skilled PT to address remaining deficits and return patient to [facility]. Based on the above history, physical examination, and baseline IM testing, an active physical therapy program is recommended.      Prognosis is: Good    GOALS: Pt is in agreement with the following goals.    Short term goals: (5 weeks or 10 visits)  1.  Pt will demonstrate increased lumbar ROM measured by med ex by at least 3 degrees from the initial ROM value with improvements noted in functional ROM and ability to perform ADLs MET  2.  Pt will demonstrate increased maximum isometric torque value by 5% when compared to the initial value MET  3.  Pt will tolerate regular 5% increases in dynamic weight loads on all machines MET  4.  Patient report a reduction in worst pain score by 1-2 points for improved tolerance during work and recreational  activities MET  5.  Pt able to perform HEP correctly with minimal cueing or supervision for therapist MET  6. Pt will demonstrate improvement in flexion/extension strength ratio compared in initial value MET    Long term goals: (10 weeks or 20 visits)  1. Pt will demonstrate increased lumbar ROM by at least 6 degrees from initial ROM value, resulting in improved ability to perform functional fwd bending while standing and sitting.    2. Pt will demonstrate increased maximum isometric torque value by 10% when compared to the initial value, resulting in improved ability to perform bending, lifting, and carrying activities safely, confidently, and 2/10 pain or less.   3. Pt will be able to ambulate community distances safely, confidently, and 2/10 pain or less.  4. Pt to demonstrate ability to independently control and reduce their pain through posture positioning and mechanical movements throughout typical work day.  5. Pt able to sleep through the night without waking with c/o pain.   6. Pt able to perform household cooking/cleaning ADLS safely, confidently, and 2/10 pain or less.  7. Pt to be able to perform self care and grooming ADLs safely, confidently, independently, and 2/10 pain or less.   8. Pt able to resume their preferred exercise regimen safely, confidently, and 2/10 pain or less.    9. Pt will be able to ascend/descend 1 flight of stairs reciprocally with use of unilateral handrail for safety, confidently and 2/10 pain or less.      Additional Specific patient expressed goals:  1. HHCs, Yardwork  2. Walking    OUTCOMES:  Functional Outcome Measure: FOTO limitation = 60% Disability  G-codes + Modifier + % Impairment: (Mobility):   Initial =  (60% - 80% disability), Goal =  (20% - 40% disability)  [place]:FOTO: 51% limitation      Plan     Continue OPPT per POC.

## 2017-01-31 NOTE — PROGRESS NOTES
Subjective:      Patient ID: Henrietta Villasenor is a 76 y.o. female.    Chief Complaint: Low-back Pain    HPI Comments: Ms Villasenor is a 75 yo female here for follow up of her CTS and her low back pain.  She was last seen by me on 9/29/2016 and we did bilateral CT injections.  [facility]  She likes therapy.  She feels like therapy helps her feel better, and more active.  She is glad when she comes, even though does not feel like it.  She feels like she is lazy on her own, and will spend more time in bed.   She is still having stomach problems.  She feels like her hands feel better.  She knows she needs to keep doing it.  She still has some back pain.  The pain is on both sides.  The pain is with sitting too long, and then when she gets up it hurts.  She feels like then she gains weight.  She has pain with walking at times and has to use the cane.  She is not using the cane now.  She feels like with walking she gets SOB, and then her back starts hurting.  Pain is 0/10 now, worst 5/10.  She feels like she has trouble swallowing at times and then she has dizziness in the morning.  She has also had some nausea    Past Medical History:    Abnormality of lung                             11/08/2011      Comment:Stable bandlike opacities at the lung bases,                most likely representing      Anxiety                                                       Arthritis                                                     CAD (coronary artery disease)                                 Cataract                                                      CHF (congestive heart failure)                                Chronic back pain                               7/29/2012     Colon polyp                                     9/2010; 1*    Colon polyps                                    7/29/2012     Coronary artery disease involving native coron*               Depression                                                     Diabetes mellitus                                             Diabetes mellitus type II                                     Diverticulitis large intestine                  7/27/2015     Diverticulosis                                  09/25/201*    Duodenal disorder                               08/25/2011      Comment:Duodenal erosion noted on EGD.    Duodenal ulcer, unspecified as acute or chroni* 8/24/2011     E. coli sepsis                                  12/2010         Comment:Due to left ureteral stone with left                nephrostomy tube - hospitalized in [place]    Esophageal dysmotility                          01/24/2012      Comment:Noted on upper GI-barium swallow.    Facial weakness                                 1969            Comment:Left facial weakness s/p left mastoidectomy in                ~ 1969.    Fatty liver                                     11/08/2011      Comment:Reported on CT-abdomen and in 06/2012 Gastro                clinic visit note.    Gastric polyp                                   09/29/2010    GERD (gastroesophageal reflux disease)          7/29/2012     Hepatomegaly                                    11/08/2011      Comment:Reported on CT-abdomen    Hiatal hernia                                   06/26/200*      Comment:Noted on barium swallow 2006; noted on  EGD                2011.    HTN (hypertension)                                            Hydradenitis                                    7/29/2012     Hyperlipidemia                                                Migraine, unspecified, without mention of intr* 2/28/2011     Migraines, neuralgic                            7/29/2012     Myocardial infarction                                         Nutcracker esophagus                            09/21/2011      Comment:Noted on EGD.    Nutcracker esophagus                            09/21/2011    Obesity                                                        MARLON (obstructive sleep apnea)                                 Peripheral neuropathy                                         Pneumonia                                                     Polyneuropathy                                                Postherpetic neuralgia                                        Recurrent nephrolithiasis                                     S/P knee replacement                            10/2/2012     S/P TKR (total knee replacement)                12/26/2012    Sensorineural hearing loss of both ears                         Comment:Mild to moderate degree hearing loss    Thyroid disease                                 11/08/2011      Comment:Thyroid nodules reported on imaging study.    Trouble in sleeping                                           Type II or unspecified type diabetes mellitus *               Type II or unspecified type diabetes mellitus *               Type II or unspecified type diabetes mellitus *               Past Surgical History:    CARDIAC CATHETERIZATION                                        EXTRACORPOREAL SHOCK WAVE LITHOTRIPSY                          mastoid tumor removal                            1969            Comment:Left mastoidectomy with residual left facial                weakness.    NEPHROSTOMY                                                    CARPAL TUNNEL RELEASE                            3/13/2012     BELPHAROPTOSIS REPAIR                                            Comment:s/p LAMBERTO levator repair - Dr. Dejesus    TOTAL KNEE ARTHROPLASTY                          9/13/2012       Comment:Left    CHOLECYSTECTOMY                                                HYSTERECTOMY                                                   COLONOSCOPY                                     N/A 11/16/2016      Comment:Procedure: COLONOSCOPY;  Surgeon: Chris Storey MD;  Location: [facility] ([address] [place] [address]);                 Service: Endoscopy;  Laterality:  N/A;    Review of patient's family history indicates:    Sleep apnea                    Sister                    Cancer                         Sister                      Comment: breast    Diabetes                       Sister                    Cancer                         Mother                      Comment: brain tumor    Hypertension                   Mother                    Heart disease                  Father                    Heart attack                   Father                    Dementia                       Brother                   Diabetes                       Brother                   Lupus                          Brother                   No Known Problems              Daughter                  No Known Problems              Son                       Diabetes                       Sister                    Lupus                          Sister                    Diabetes                       Sister                    Cancer                         Sister                    Kidney disease                 Sister                    Heart disease                  Sister                    Liver cancer                   Brother                   Cirrhosis                      Brother                   No Known Problems              Daughter                  No Known Problems              Son                       No Known Problems              Son                       Diabetes                       Other                     Ovarian cancer                 Other                       Comment: Niece     Glaucoma                       Neg Hx                    Blindness                      Neg Hx                    Celiac disease                 Neg Hx                    Colon cancer                   Neg Hx                    Colon polyps                   Neg Hx                    Esophageal cancer              Neg Hx                    Inflammatory bowel disease     Neg Hx                     Liver disease                  Neg Hx                    Rectal cancer                  Neg Hx                    Stomach cancer                 Neg Hx                    Ulcerative colitis             Neg Hx                    Melanoma                       Neg Hx                    Multiple sclerosis             Neg Hx                    Psoriasis                      Neg Hx                      Social History    Marital status: Single              Spouse name:                       Years of education:                 Number of children: 5             Social History Main Topics    Smoking status: Former Smoker                                                                Packs/day: 1.00      Years: 15.00          Types: Cigarettes       Quit date: 7/24/2000    Smokeless status: Never Used                        Alcohol use: No              Drug use: No              Sexual activity: No                   Social History Narrative    Single with 5 children. Lives alone. Retired cook.        Current Outpatient Prescriptions:  ACCU-CHEK FASTCLIX Misc, , Disp: , Rfl:   ACCU-CHEK SMARTVIEW TEST STRIP Strp, TEST THREE TIMES DAILY, Disp: 300 strip, Rfl: 3  albuterol (PROAIR HFA) 90 mcg/actuation inhaler, Inhale 2 puffs into the lungs every 4 (four) hours as needed. 2 HFA Aerosol Inhaler Inhalation Every 4-6 hours. Ventolin or PRoair for asthma, Disp: 1 Inhaler, Rfl: 6  albuterol 90 mcg/actuation inhaler, Inhale 2 puffs into the lungs every 6 (six) hours as needed for Wheezing., Disp: 1 Inhaler, Rfl: 6  ALCOHOL ANTISEPTIC PADS (ALCOHOL PREP PADS TOP), , Disp: , Rfl:   amlodipine (NORVASC) 10 MG tablet, TAKE 1 TABLET EVERY MORNING., Disp: 90 tablet, Rfl: 3  amoxicillin-clavulanate 875-125mg (AUGMENTIN) 875-125 mg per tablet, Take 1 tablet by mouth every 12 (twelve) hours., Disp: 14 tablet, Rfl: 0  aspirin (ECOTRIN) 81 MG EC tablet, Take 1 tablet by mouth Daily., Disp: , Rfl:   atorvastatin (LIPITOR) 40 MG tablet, , Disp: ,  "Rfl:   baclofen (LIORESAL) 10 MG tablet, 1/2 - 1 tablet at bedtime as needed for muscle spasm, Disp: 90 tablet, Rfl: 3  BD ALCOHOL SWAB PadM, , Disp: , Rfl:   BD ULTRA-FINE OLU PEN NEEDLES 32 x 5/32 " Ndle, Uses 4 times daily, on multiple daily insulin injections, Disp: 130 each, Rfl: 12  benzonatate (TESSALON) 200 MG capsule, Take 1 capsule (200 mg total) by mouth every evening., Disp: 30 capsule, Rfl: 0  carvedilol (COREG) 25 MG tablet, TAKE 1 TABLET TWICE DAILY, Disp: 180 tablet, Rfl: 3  cholecalciferol, vitamin D3, (VITAMIN D) 2,000 unit Cap, Take by mouth. 1 Capsule Oral Every morning, Disp: , Rfl:   dicyclomine (BENTYL) 10 MG capsule, Take 1 capsule (10 mg total) by mouth 3 (three) times daily as needed (abdominal pain)., Disp: 270 capsule, Rfl: 3  duloxetine (CYMBALTA) 60 MG capsule, Take 1 capsule (60 mg total) by mouth once daily., Disp: 90 capsule, Rfl: 4  ferrous sulfate 325 mg (65 mg iron) Tab tablet, Take 1 tablet (325 mg total) by mouth once daily., Disp: 30 tablet, Rfl: 11  FIRST AID CLOTH TAPE 1 X 10 "-yard Tape, , Disp: , Rfl:   FIRST AID TAPE 1 X 2.3 "-yard Bndg, , Disp: , Rfl:   furosemide (LASIX) 40 MG tablet, TAKE 1 TABLET twice daily, Disp: 270 tablet, Rfl: 3  gabapentin (NEURONTIN) 100 MG capsule, TAKE 1 CAPSULE THREE TIMES DAILY  AND TAKE 3 CAPSULES AT BEDTIME, Disp: 540 capsule, Rfl: 2  insulin glargine (LANTUS SOLOSTAR) 100 unit/mL (3 mL) InPn pen, Inject 54 Units into the skin every evening., Disp: 4 Box, Rfl: 5  insulin lispro (HUMALOG KWIKPEN) 100 unit/mL InPn pen, Inject 23 Units into the skin 3 (three) times daily with meals. Plus correction scale, for max TDD 84 units daily, Disp: 5 Box, Rfl: 5  Lactobacillus acidophilus (PROBIOTIC) 10 billion cell Cap, Take 1 capsule by mouth once daily., Disp: , Rfl:   methylPREDNISolone (MEDROL DOSEPACK) 4 mg tablet, use as directed, Disp: 1 Package, Rfl: 0  nitroGLYCERIN (NITROSTAT) 0.4 MG SL tablet, Place 1 tablet (0.4 mg total) under the tongue " every 5 (five) minutes as needed., Disp: 25 tablet, Rfl: 2  oxycodone-acetaminophen (PERCOCET)  mg per tablet, Take 1 tablet by mouth 5 (five) times daily. Dose increased from 4 times daily to 5 times daily. Please fill early, Disp: 150 tablet, Rfl: 0  oxycodone-acetaminophen (PERCOCET)  mg per tablet, Take 1 tablet by mouth 5 (five) times daily., Disp: 150 tablet, Rfl: 0  oxycodone-acetaminophen (PERCOCET)  mg per tablet, Take 1 tablet by mouth 5 (five) times daily., Disp: 150 tablet, Rfl: 0  pantoprazole (PROTONIX) 40 MG tablet, TAKE 1 TABLET(40 MG) BY MOUTH EVERY DAY, Disp: 90 tablet, Rfl: 11  promethazine (PHENERGAN) 6.25 mg/5 mL syrup, Take 5 mLs (6.25 mg total) by mouth every 4 (four) hours as needed (itching & congestion)., Disp: 118 mL, Rfl: 0  valacyclovir (VALTREX) 500 MG tablet, TAKE 1 TABLET ONE TIME DAILY, Disp: 90 tablet, Rfl: 2  fluticasone (FLONASE) 50 mcg/actuation nasal spray, SHAKE LQ AND U 2 SPRAYS IEN QD, Disp: , Rfl: 6  triamcinolone acetonide 0.1% (KENALOG) 0.1 % cream, Apply topically 3 (three) times daily., Disp: 28.4 g, Rfl: 0    No current facility-administered medications for this visit.       Review of patient's allergies indicates:   -- Crestor (rosuvastatin)     --  Cramping in legs   -- Ezetimibe     --  Other reaction(s): abdominal pain, Diarrhea   -- Hydrocodone     --  Other reaction(s): Itching   -- Lisinopril     --  Other reaction(s): cough   -- Sulfa (sulfonamide antibiotics)     --  Itching and Rash   -- Sulfamethoxazole    -- Sulfamethoxazole-trimethoprim    -- Trimethoprim    -- Valsartan     --  Other reaction(s): Angioedema        Review of Systems   Constitution: Negative for weight gain and weight loss.   Cardiovascular: Positive for dyspnea on exertion. Negative for chest pain.   Respiratory: Negative for shortness of breath.    Musculoskeletal: Positive for back pain and myalgias. Negative for joint pain, joint swelling and neck pain.    Gastrointestinal: Negative for abdominal pain and bowel incontinence.   Genitourinary: Negative for bladder incontinence.   Neurological: Positive for dizziness and numbness (bilateral hands better after injection).         Objective:        General: Henrietta is well-developed, well-nourished, appears stated age, in no acute distress, alert and oriented to time, place and person.     General    Vitals reviewed.  Constitutional: She is oriented to person, place, and time. She appears well-developed and well-nourished.   HENT:   Head: Normocephalic and atraumatic.   Pulmonary/Chest: Effort normal.   Neurological: She is alert and oriented to person, place, and time.   Psychiatric: She has a normal mood and affect. Her behavior is normal. Judgment and thought content normal.     General Musculoskeletal Exam   Gait: normal     Right Ankle/Foot Exam     Tests   Heel Walk: able to perform  Tiptoe Walk: able to perform    Left Ankle/Foot Exam     Tests   Heel Walk: able to perform  Tiptoe Walk: able to perform  Back (L-Spine & T-Spine) / Neck (C-Spine) Exam     Tenderness Right paramedian tenderness of the Sacrum. Left paramedian tenderness of the Sacrum.     Back (L-Spine & T-Spine) Range of Motion   Extension: 10   Flexion: 60   Lateral Bend Right: 10   Lateral Bend Left: 10   Rotation Right: 20   Rotation Left: 20     Spinal Sensation   Right Side Sensation  C-Spine Level: normal   L-Spine Level: normal  S-Spine Level: normal  Left Side Sensation  C-Spine Level: normal  L-Spine Level: normal  S-Spine Level: normal    Back (L-Spine & T-Spine) Tests   Right Side Tests  Straight leg raise:      Sitting SLR: > 70 degrees      Left Side Tests  Straight leg raise:     Sitting SLR: > 70 degrees          Other She has no scoliosis .  Spinal Kyphosis:  Absent      Right Hand/Wrist Exam     Tests   Phalens Sign: positive  Tinels Sign (Medial Nerve): positive    Atrophy   Thenar:  negative  Hypothenar:  negative      Left  Hand/Wrist Exam     Tests   Phalens Sign: positive  Tinels Sign (Medial Nerve): positive    Atrophy  Thenar:  Negative  Hypothenar:  negative          Muscle Strength   Right Upper Extremity   Biceps: 5/5/5   Deltoid:  5/5  Triceps:  5/5  Wrist Extension: 5/5/5   : 5/5/5   Finger Flexors:  5/5  Left Upper Extremity  Biceps: 5/5/5   Deltoid:  5/5  Triceps:  5/5  Wrist Extension: 5/5/5   :  5/5/5   Finger Flexors:  5/5  Right Lower Extremity   Hip Flexion: 5/5   Quadriceps:  5/5   Anterior tibial:  5/5/5  EHL:  5/5  Left Lower Extremity   Hip Flexion: 5/5   Quadriceps:  5/5   Anterior tibial:  5/5/5   EHL:  5/5    Reflexes     Left Side  Biceps:  2+  Triceps:  2+  Brachioradialis:  2+  Quadriceps:  2+  Achilles:  2+  Left Nichols's Sign:  Absent  Babinski Sign:  absent    Right Side   Biceps:  2+  Triceps:  2+  Brachioradialis:  2+  Quadriceps:  2+  Achilles:  2+  Right Nichols's Sign:  absent  Babinski Sign:  absent    Vascular Exam     Right Pulses        Carotid:                  2+    Left Pulses        Carotid:                  2+    Capillary Refill  Right Hand: normal capillary refill  Left Hand: normal capillary refill              Assessment:       1. DDD (degenerative disc disease), lumbar    2. Spinal enthesopathy    3. Muscle spasm    4. Chronic bilateral low back pain without sciatica    5. Bilateral carpal tunnel syndrome           Plan:          1. Carpal tunnel injection on 9/29/2016 was very helpful  2.  hand splints at night  3. Continue gabapentin 2 in am And 3 at night  4. She has been going to PT, and she finds it very helpful.  We discussed making a plan to continue in [facility].  She did not continue last time.  She feels better moving.  She likes coming here for therapy  5. She is going to follow up with Dr. Arriaga, she is having some AQUINO, dizziness, and swelling.  A message was sent to Dr. Arriaga to let her know  6.  rtc 4 months      Follow-up: Return in about 4 months  (around 5/31/2017). If there are any questions prior to this, the patient was instructed to contact the office.

## 2017-01-31 NOTE — PROGRESS NOTES
"[facility]   LUMBAR TREATMENT NOTE VISIT 15      Date: 1/31/2017    Time: 10:00-11:00 am  Precautions: L TKA (2014), Hx CA L face, CTS Mynor, gout    Pattern: Pattern 1 , no preference    Eval date: 10/25/16  Reassessment due: 11/25/16, done 11/29/16  Reassessment due: 12/29/16, done 1/4/17  Next reassessment due: 2/4/17    POC signed....10/25/16  Next POC due...01/25/17, sent 1/31/17  Next POC due: 3/3/17    PT/PTA face to face conference: 10/27/16 done  Conference due: 11/17/16 Pt seen by a PT with the session. 1/10/17 done  Conference due: 2/10/17      Subjective     Pt presents today with 0-3/10 LBP before and  0/10 after treatment.  She states that she is back to being able to perform her HEP.  She knows the stretching is helping her and she is feeling stronger.  She had an upset stomach today but was glad she came in for her session.     Work: retired  Leisure: walking, working in yard (lives alone but has neighbors and nieces to assist if needed)  Equipment: [employer]                      History       Pattern of pain questions: Pattern 1    1.  Where is your pain the worst? back  2.  Is your pain constant or intermittent? Constant, varies in intensity  3.  Does bending forward make your typical pain worse? No, just increases "stretch"  4.  Since the start of your back pain, has there been a change in your bowel or bladder? no  5.  What can't you do now that you use to be able to do? Walking, HHCs, yardwork, working out      Objective     No environmental, cultural, spiritual, developmental or education needs expressed or noted    POSTURE at eval:  Sitting: slump sitting with FHP, rounded shoulders and increased C-curve in LSP  Standing: increased thoracic kyphosis, slight decrease in Lumbar lordosis, no shift    Correction of posture: slimline  Relevant: yes    MOVEMENT LOSS 1/31/17:  Flexion:  WFL (fingertips to near ankle) IMP  Extension: limited    Pain: no,   Sidebending RIGHT: " WFL      Pain: no  Sidebending LEFT:  WFL     Pain: no  Rotation RIGHT: WFL  Pain: no  Rotation LEFT: WFL  Pain: no    Repeated standing extensions: improved symptoms  Repeated prone extensions: slight pain, improved symptoms after decreasing ROM    Baseline IM Testing Results: 10/25/16  ROM: 0-33  Max Peak Torque: 111  Min Peak Torque: 21   Flex/ext ratio: 5.29:1    [facility] Testing Results: 1/4/17  ROM: 0-36 deg  MAx Peak Torque: 204 ftlbs  Min Peak Torque: 73 ftlbs  Flex Ext ratio:2.7:1 (imp)  Avg Change Sum of Forces: 171%       Treatment     Pt was instructed in and performed the following:  Cardiovascular exercise and therapeutic exercise to improve posture, lumbar spine and supporting musculature ROM, strength, and muscular endurance as follows:    [facility] 1/31/2017   Visit Number 15   VAS Pain Rating 3   Recumbent Bike Seat Pos. 19   Time 10   Extension in Lying 10   Extension in Standing 10   Flexion in Lying 10   Lumbar Weight 70   Repetitions 20   Rating of Perceived Exertion 3   Ice - Z Lie (in min.) 10           Peripheral muscle strengthening which included 1 set of 15-20 repetitions at a slow, controlled 7 second per rep pace focused on strengthening supporting musculature for improved body mechanics and functional mobility.  Pt and therapist focused on proper form during treatment to ensure optimal strengthening of each targeted muscle group.  Machines were utilized including torso rotation, leg extension, leg curl, chest press, upright row, triceps extension, biceps curls, leg press and hip abduction.     11/1/16  Tx exercises performed today: LTR, DKTC/MOUSTAPHA, SKTC, Supine pelvic tilts, Standing Pelvic tilts - 10x ea    11/17/16:  10 reps each LTR, PPT, and DKTC on ball    HEP as follows:   LTR, SKTC, Supine pelvic tilts - 10x ea set, 2-3 sets per day  Pt educated on HEP and activity modifications to reduce c/o pain and improve overall function. Pt also educated on use of modalities  prn to reduce c/o pain, dysfunction.    11/29/16:  Standing lumbar extensions 10x, 3x daily  Prone lumbar extensions 10x, 2xdaily      (patient has handouts and demonstrated and expressed understanding of the following)      Assessment       Pt present for her 15th visit today with an upset stomach, but glad she could make it.  Pt presents today with 0-3/10 LBP prior to and 0/10 after treatment.  She reports she did  perform HEP exercises more over the last few days. She required minimal cueing for recall and performance of HEP exercises, with minimal vc's to decrease speed of performance and intensity of exercise to pain-free ROM. Pt tolerated a weight at 70 ftlbs on the lumbar med x machine, completing 20 reps with an RPE of 3. Pt completed all peripheral resistance exercises with no reports of increased symptoms or discomfort. Pt is progressing well towards treatment goals and would benefit from continued skilled physical therapy treatment.     PT diagnosis: chronic low back pain 2* DDD    Pattern: Pattern 1    Medical necessity is demonstrated by the following problem list.  Pt presents with the following impairments: decreased trunk and hip strength, decreased trunk ROM, decreased postural awareness and endurance, decreased NM control/coordination. Current impairments limits patient with all functional activities. Patient requires skilled PT to address remaining deficits and return patient to [facility]. Based on the above history, physical examination, and baseline IM testing, an active physical therapy program is recommended.      Prognosis is: Good    GOALS: Pt is in agreement with the following goals.    Short term goals: (5 weeks or 10 visits)  1.  Pt will demonstrate increased lumbar ROM measured by med ex by at least 3 degrees from the initial ROM value with improvements noted in functional ROM and ability to perform ADLs MET  2.  Pt will demonstrate increased maximum isometric torque value by 5% when compared  to the initial value MET  3.  Pt will tolerate regular 5% increases in dynamic weight loads on all machines MET  4.  Patient report a reduction in worst pain score by 1-2 points for improved tolerance during work and recreational activities MET  5.  Pt able to perform HEP correctly with minimal cueing or supervision for therapist MET  6. Pt will demonstrate improvement in flexion/extension strength ratio compared in initial value MET    Long term goals: (10 weeks or 20 visits)  1. Pt will demonstrate increased lumbar ROM by at least 6 degrees from initial ROM value, resulting in improved ability to perform functional fwd bending while standing and sitting.    2. Pt will demonstrate increased maximum isometric torque value by 10% when compared to the initial value, resulting in improved ability to perform bending, lifting, and carrying activities safely, confidently, and 2/10 pain or less.   3. Pt will be able to ambulate community distances safely, confidently, and 2/10 pain or less.  4. Pt to demonstrate ability to independently control and reduce their pain through posture positioning and mechanical movements throughout typical work day.  5. Pt able to sleep through the night without waking with c/o pain.   6. Pt able to perform household cooking/cleaning ADLS safely, confidently, and 2/10 pain or less.  7. Pt to be able to perform self care and grooming ADLs safely, confidently, independently, and 2/10 pain or less.   8. Pt able to resume their preferred exercise regimen safely, confidently, and 2/10 pain or less.    9. Pt will be able to ascend/descend 1 flight of stairs reciprocally with use of unilateral handrail for safety, confidently and 2/10 pain or less.      Additional Specific patient expressed goals:  1. HHCs, Yardwork  2. Walking    OUTCOMES:  Functional Outcome Measure: FOTO limitation = 60% Disability  G-codes + Modifier + % Impairment: (Mobility):   Initial =  (60% - 80% disability), Goal =   (20% - 40% disability)  [facility] [place]:FOTO: 51% limitation      Plan     Continue OPPT per POC.

## 2017-01-31 NOTE — Clinical Note
She has some unusual complaints today, nausea, trouble swallowing, dizziness, foot swelling and some AQUINO.  She has an appointment with you on 2/22/2017.  She does not seem SOB during the visit, but wanted to let you know

## 2017-02-14 ENCOUNTER — CLINICAL SUPPORT (OUTPATIENT)
Dept: REHABILITATION | Facility: OTHER | Age: 77
End: 2017-02-14
Attending: PHYSICAL MEDICINE & REHABILITATION
Payer: MEDICARE

## 2017-02-14 DIAGNOSIS — M51.36 DDD (DEGENERATIVE DISC DISEASE), LUMBAR: Primary | ICD-10-CM

## 2017-02-14 PROCEDURE — 97110 THERAPEUTIC EXERCISES: CPT | Performed by: PHYSICAL THERAPIST

## 2017-02-14 NOTE — PROGRESS NOTES
"[facility]   LUMBAR TREATMENT NOTE VISIT 16      Date: 2/14/2017    Time: 10: am  Precautions: L TKA (2014), Hx CA L face, CTS Mynor, gout    Pattern: Pattern 1 , no preference    Eval date: 10/25/16  Reassessment due: 11/25/16, done 11/29/16  Reassessment due: 12/29/16, done 1/4/17  Next reassessment due: 2/4/17, done on next visit 2/14/17  Next reassessment due: 3/14/17    POC signed....10/25/16  Next POC due...01/25/17, sent 1/31/17  Next POC due: 3/3/17    PT/PTA face to face conference: 10/27/16 done  Conference due: 11/17/16 Pt seen by a PT with the session. 1/10/17 done  Conference due: 2/10/17      Subjective     Pt presents today with 0/10 LBP before and  0/10 after treatment. She was sick last week but is feeling better now.  She states that she is back to being able to perform her HEP.  She knows the stretching is helping her and she is feeling stronger.  She is noting that she can do more and is stronger overall    Work: retired  Leisure: walking, working in yard (lives alone but has neighbors and nieces to assist if needed)  Equipment: [employer]                      History       Pattern of pain questions: Pattern 1    1.  Where is your pain the worst? back  2.  Is your pain constant or intermittent? Constant, varies in intensity  3.  Does bending forward make your typical pain worse? No, just increases "stretch"  4.  Since the start of your back pain, has there been a change in your bowel or bladder? no  5.  What can't you do now that you use to be able to do? Walking, HHCs, yardwork, working out      Objective     No environmental, cultural, spiritual, developmental or education needs expressed or noted    POSTURE at eval:  Sitting: slump sitting with FHP, rounded shoulders and increased C-curve in LSP  Standing: increased thoracic kyphosis, slight decrease in Lumbar lordosis, no shift    Correction of posture: slimline  Relevant: yes    MOVEMENT LOSS 2/14/17:  Flexion: "  WFL (fingertips to near ankle) IMP  Extension: limited    Pain: no,   Sidebending RIGHT: WFL      Pain: no  Sidebending LEFT:  WFL     Pain: no  Rotation RIGHT: WFL  Pain: no  Rotation LEFT: WFL  Pain: no    Repeated standing extensions: improved symptoms  Repeated prone extensions: slight pain, improved symptoms after decreasing ROM    Baseline IM Testing Results: 10/25/16  ROM: 0-33  Max Peak Torque: 111  Min Peak Torque: 21   Flex/ext ratio: 5.29:1    [facility] Testing Results: 1/4/17  ROM: 0-36 deg  MAx Peak Torque: 204 ftlbs  Min Peak Torque: 73 ftlbs  Flex Ext ratio:2.7:1 (imp)  Avg Change Sum of Forces: 171%       Treatment     Pt was instructed in and performed the following:  Cardiovascular exercise and therapeutic exercise to improve posture, lumbar spine and supporting musculature ROM, strength, and muscular endurance as follows:    [facility] 2/14/2017   Visit Number 16   VAS Pain Rating 0   Recumbent Bike Seat Pos. 19   Time 10   Flexion in Lying 10   Flexion in Sitting 10   Lumbar Weight 74   Repetitions 20   Rating of Perceived Exertion 4   Ice - Z Lie (in min.) 10       Peripheral muscle strengthening which included 1 set of 15-20 repetitions at a slow, controlled 7 second per rep pace focused on strengthening supporting musculature for improved body mechanics and functional mobility.  Pt and therapist focused on proper form during treatment to ensure optimal strengthening of each targeted muscle group.  Machines were utilized including torso rotation, leg extension, leg curl, chest press, upright row, triceps extension, biceps curls, leg press and hip abduction.     11/1/16  Tx exercises performed today: LTR, DKTC/MOUSTAPHA, SKTC, Supine pelvic tilts, Standing Pelvic tilts - 10x ea    11/17/16:  10 reps each LTR, PPT, and DKTC on ball    HEP as follows:   LTR, SKTC, Supine pelvic tilts - 10x ea set, 2-3 sets per day  Pt educated on HEP and activity modifications to reduce c/o pain and improve  overall function. Pt also educated on use of modalities prn to reduce c/o pain, dysfunction.    11/29/16:  Standing lumbar extensions 10x, 3x daily  Prone lumbar extensions 10x, 2xdaily      (patient has handouts and demonstrated and expressed understanding of the following)      Assessment       Pt present for her 16th visit today with no LBP and overall feeling better.   Pt presents today with 0/10 LBP prior to and 0/10 after treatment.  She reports she did  perform HEP exercises more over the last few days. She required minimal cueing for recall and performance of HEP exercises, with minimal vc's to decrease speed of performance and intensity of exercise to pain-free ROM. Pt tolerated a weight at 74 ftlbs on the lumbar med x machine, completing 20  reps with an RPE of4  . Pt completed all peripheral resistance exercises with no reports of increased symptoms or discomfort. Pt is progressing well towards treatment goals and would benefit from continued skilled physical therapy treatment.     PT diagnosis: chronic low back pain 2* DDD    Pattern: Pattern 1    Medical necessity is demonstrated by the following problem list.  Pt presents with the following impairments: decreased trunk and hip strength, decreased trunk ROM, decreased postural awareness and endurance, decreased NM control/coordination. Current impairments limits patient with all functional activities. Patient requires skilled PT to address remaining deficits and return patient to [facility]. Based on the above history, physical examination, and baseline IM testing, an active physical therapy program is recommended.      Prognosis is: Good    GOALS: Pt is in agreement with the following goals.    Short term goals: (5 weeks or 10 visits)  1.  Pt will demonstrate increased lumbar ROM measured by med ex by at least 3 degrees from the initial ROM value with improvements noted in functional ROM and ability to perform ADLs MET  2.  Pt will demonstrate increased  maximum isometric torque value by 5% when compared to the initial value MET  3.  Pt will tolerate regular 5% increases in dynamic weight loads on all machines MET  4.  Patient report a reduction in worst pain score by 1-2 points for improved tolerance during work and recreational activities MET  5.  Pt able to perform HEP correctly with minimal cueing or supervision for therapist MET  6. Pt will demonstrate improvement in flexion/extension strength ratio compared in initial value MET    Long term goals: (10 weeks or 20 visits)  1. Pt will demonstrate increased lumbar ROM by at least 6 degrees from initial ROM value, resulting in improved ability to perform functional fwd bending while standing and sitting.    2. Pt will demonstrate increased maximum isometric torque value by 10% when compared to the initial value, resulting in improved ability to perform bending, lifting, and carrying activities safely, confidently, and 2/10 pain or less.   3. Pt will be able to ambulate community distances safely, confidently, and 2/10 pain or less.  4. Pt to demonstrate ability to independently control and reduce their pain through posture positioning and mechanical movements throughout typical work day.  5. Pt able to sleep through the night without waking with c/o pain.   6. Pt able to perform household cooking/cleaning ADLS safely, confidently, and 2/10 pain or less.  7. Pt to be able to perform self care and grooming ADLs safely, confidently, independently, and 2/10 pain or less.   8. Pt able to resume their preferred exercise regimen safely, confidently, and 2/10 pain or less.    9. Pt will be able to ascend/descend 1 flight of stairs reciprocally with use of unilateral handrail for safety, confidently and 2/10 pain or less.      Additional Specific patient expressed goals:  1. HHCs, Yardwork  2. Walking    OUTCOMES:  Functional Outcome Measure: FOTO limitation = 60% Disability  G-codes + Modifier + % Impairment: (Mobility):    Initial =  (60% - 80% disability), Goal =  (20% - 40% disability)  [place]:FOTO: 51% limitation      Plan     Continue OPPT per POC.

## 2017-02-15 DIAGNOSIS — E11.42 TYPE 2 DIABETES MELLITUS WITH DIABETIC POLYNEUROPATHY, WITH LONG-TERM CURRENT USE OF INSULIN: ICD-10-CM

## 2017-02-15 DIAGNOSIS — Z79.4 TYPE 2 DIABETES MELLITUS WITH DIABETIC POLYNEUROPATHY, WITH LONG-TERM CURRENT USE OF INSULIN: ICD-10-CM

## 2017-02-15 RX ORDER — INSULIN GLARGINE 100 [IU]/ML
54 INJECTION, SOLUTION SUBCUTANEOUS NIGHTLY
Qty: 2 BOX | Refills: 3 | Status: SHIPPED | OUTPATIENT
Start: 2017-02-15 | End: 2017-03-10 | Stop reason: SDUPTHER

## 2017-02-16 ENCOUNTER — CLINICAL SUPPORT (OUTPATIENT)
Dept: REHABILITATION | Facility: OTHER | Age: 77
End: 2017-02-16
Attending: PHYSICAL MEDICINE & REHABILITATION
Payer: MEDICARE

## 2017-02-16 DIAGNOSIS — M51.36 DDD (DEGENERATIVE DISC DISEASE), LUMBAR: Primary | ICD-10-CM

## 2017-02-16 PROCEDURE — 97110 THERAPEUTIC EXERCISES: CPT | Performed by: PHYSICAL THERAPIST

## 2017-02-16 NOTE — PROGRESS NOTES
"[facility]   LUMBAR TREATMENT NOTE VISIT 17      Date: 2/16/2017    Time: 10: am  Precautions: L TKA (2014), Hx CA L face, CTS Mynor, gout    Pattern: Pattern 1 , no preference    Eval date: 10/25/16  Reassessment due: 11/25/16, done 11/29/16  Reassessment due: 12/29/16, done 1/4/17  Next reassessment due: 2/4/17, done on next visit 2/14/17  Next reassessment due: 3/14/17    POC signed....10/25/16  Next POC due...01/25/17, sent 1/31/17  Next POC due: 3/3/17    PT/PTA face to face conference: 10/27/16 done  Conference due: 11/17/16 Pt seen by a PT with the session. 1/10/17 done  Conference due: 2/10/17      Subjective     Pt presents today with 0/10 LBP before and  0/10 after treatment. She continues to feel better overall and is pleased with her progress.   She states that she is back to being able to perform her HEP.  She knows the stretching is helping her and she is feeling stronger.  She is noting that she can do more and is stronger overall.      Work: retired  Leisure: walking, working in yard (lives alone but has neighbors and nieces to assist if needed)  Equipment: [employer]                      History       Pattern of pain questions: Pattern 1    1.  Where is your pain the worst? back  2.  Is your pain constant or intermittent? Constant, varies in intensity  3.  Does bending forward make your typical pain worse? No, just increases "stretch"  4.  Since the start of your back pain, has there been a change in your bowel or bladder? no  5.  What can't you do now that you use to be able to do? Walking, HHCs, yardwork, working out      Objective     No environmental, cultural, spiritual, developmental or education needs expressed or noted    POSTURE at eval:  Sitting: slump sitting with FHP, rounded shoulders and increased C-curve in LSP  Standing: increased thoracic kyphosis, slight decrease in Lumbar lordosis, no shift    Correction of posture: slimline  Relevant: " yes    MOVEMENT LOSS 2/14/17:  Flexion:  WFL (fingertips to near ankle) IMP  Extension: limited    Pain: no,   Sidebending RIGHT: WFL      Pain: no  Sidebending LEFT:  WFL     Pain: no  Rotation RIGHT: WFL  Pain: no  Rotation LEFT: WFL  Pain: no    Repeated standing extensions: improved symptoms  Repeated prone extensions: slight pain, improved symptoms after decreasing ROM    Baseline IM Testing Results: 10/25/16  ROM: 0-33  Max Peak Torque: 111  Min Peak Torque: 21   Flex/ext ratio: 5.29:1    [facility] IM Testing Results: 1/4/17  ROM: 0-36 deg  MAx Peak Torque: 204 ftlbs  Min Peak Torque: 73 ftlbs  Flex Ext ratio:2.7:1 (imp)  Avg Change Sum of Forces: 171%       Treatment     Pt was instructed in and performed the following:  Cardiovascular exercise and therapeutic exercise to improve posture, lumbar spine and supporting musculature ROM, strength, and muscular endurance as follows:      [facility] 2/16/2017   Visit Number 17   VAS Pain Rating 0   Recumbent Bike Seat Pos. 19   Time 10   Flexion in Lying 10   Flexion in Sitting 10   Lumbar Weight 78   Repetitions 20   Rating of Perceived Exertion 3   Ice - Z Lie (in min.) 10       Peripheral muscle strengthening which included 1 set of 15-20 repetitions at a slow, controlled 7 second per rep pace focused on strengthening supporting musculature for improved body mechanics and functional mobility.  Pt and therapist focused on proper form during treatment to ensure optimal strengthening of each targeted muscle group.  Machines were utilized including torso rotation, leg extension, leg curl, chest press, upright row, triceps extension, biceps curls, leg press and hip abduction.     11/1/16  Tx exercises performed today: LTR, DKTC/MOUSTAPHA, SKTC, Supine pelvic tilts, Standing Pelvic tilts - 10x ea    11/17/16:  10 reps each LTR, PPT, and DKTC on ball    HEP as follows:   LTR, SKTC, Supine pelvic tilts - 10x ea set, 2-3 sets per day  Pt educated on HEP and activity  modifications to reduce c/o pain and improve overall function. Pt also educated on use of modalities prn to reduce c/o pain, dysfunction.    11/29/16:  Standing lumbar extensions 10x, 3x daily  Prone lumbar extensions 10x, 2xdaily      (patient has handouts and demonstrated and expressed understanding of the following)      Assessment       Pt present for her 17h visit today with no LBP and overall feeling better.   Pt presents today with 0/10 LBP prior to and 0/10 after treatment.  She reports she did  perform HEP exercises more over the last few days. She required minimal cueing for recall and performance of HEP exercises, with minimal vc's to decrease speed of performance and intensity of exercise to pain-free ROM. Pt tolerated a weight at 78 ftlbs on the lumbar med x machine, completing 20  reps with an RPE of 3  . Pt completed all peripheral resistance exercises with no reports of increased symptoms or discomfort. Pt is progressing well towards treatment goals and would benefit from continued skilled physical therapy treatment.     PT diagnosis: chronic low back pain 2* DDD    Pattern: Pattern 1    Medical necessity is demonstrated by the following problem list.  Pt presents with the following impairments: decreased trunk and hip strength, decreased trunk ROM, decreased postural awareness and endurance, decreased NM control/coordination. Current impairments limits patient with all functional activities. Patient requires skilled PT to address remaining deficits and return patient to [facility]. Based on the above history, physical examination, and baseline IM testing, an active physical therapy program is recommended.      Prognosis is: Good    GOALS: Pt is in agreement with the following goals.    Short term goals: (5 weeks or 10 visits)  1.  Pt will demonstrate increased lumbar ROM measured by med ex by at least 3 degrees from the initial ROM value with improvements noted in functional ROM and ability to perform  ADLs MET  2.  Pt will demonstrate increased maximum isometric torque value by 5% when compared to the initial value MET  3.  Pt will tolerate regular 5% increases in dynamic weight loads on all machines MET  4.  Patient report a reduction in worst pain score by 1-2 points for improved tolerance during work and recreational activities MET  5.  Pt able to perform HEP correctly with minimal cueing or supervision for therapist MET  6. Pt will demonstrate improvement in flexion/extension strength ratio compared in initial value MET    Long term goals: (10 weeks or 20 visits)  1. Pt will demonstrate increased lumbar ROM by at least 6 degrees from initial ROM value, resulting in improved ability to perform functional fwd bending while standing and sitting.    2. Pt will demonstrate increased maximum isometric torque value by 10% when compared to the initial value, resulting in improved ability to perform bending, lifting, and carrying activities safely, confidently, and 2/10 pain or less.   3. Pt will be able to ambulate community distances safely, confidently, and 2/10 pain or less.  4. Pt to demonstrate ability to independently control and reduce their pain through posture positioning and mechanical movements throughout typical work day.  5. Pt able to sleep through the night without waking with c/o pain.   6. Pt able to perform household cooking/cleaning ADLS safely, confidently, and 2/10 pain or less.  7. Pt to be able to perform self care and grooming ADLs safely, confidently, independently, and 2/10 pain or less.   8. Pt able to resume their preferred exercise regimen safely, confidently, and 2/10 pain or less.    9. Pt will be able to ascend/descend 1 flight of stairs reciprocally with use of unilateral handrail for safety, confidently and 2/10 pain or less.      Additional Specific patient expressed goals:  1. HHCs, Yardwork  2. Walking    OUTCOMES:  Functional Outcome Measure: FOTO limitation = 60%  Disability  G-codes + Modifier + % Impairment: (Mobility):   Initial =  (60% - 80% disability), Goal =  (20% - 40% disability)  [facility] [place]:FOTO: 51% limitation      Plan     Continue OPPT per POC. See 3 more visits and plan for DC to wellness.

## 2017-02-21 ENCOUNTER — CLINICAL SUPPORT (OUTPATIENT)
Dept: REHABILITATION | Facility: OTHER | Age: 77
End: 2017-02-21
Attending: PHYSICAL MEDICINE & REHABILITATION
Payer: MEDICARE

## 2017-02-21 DIAGNOSIS — M51.36 DDD (DEGENERATIVE DISC DISEASE), LUMBAR: Primary | ICD-10-CM

## 2017-02-21 PROCEDURE — 97110 THERAPEUTIC EXERCISES: CPT

## 2017-02-21 NOTE — PROGRESS NOTES
"[facility]   LUMBAR TREATMENT NOTE VISIT 18      Date: 2/21/2017    Time: 2-3 pm  Precautions: L TKA (2014), Hx CA L face, CTS Mynor, gout    Pattern: Pattern 1 , no preference    Eval date: 10/25/16  Reassessment due: 11/25/16, done 11/29/16  Reassessment due: 12/29/16, done 1/4/17  Next reassessment due: 2/4/17, done on next visit 2/14/17  Next reassessment due: 3/14/17    POC signed....10/25/16  Next POC due...01/25/17, sent 1/31/17  Next POC due: 3/3/17    PT/PTA face to face conference: 10/27/16 done  Conference due: 11/17/16 Pt seen by a PT with the session. 1/10/17 done  Conference due: 2/10/17 done 2/21/17   Conference due: 2/31/17      Subjective     Pt presents today with 4/10 LBP before and  0/10 after treatment. She continues to feel better overall and is pleased with her progress.   She states that she is back to being able to perform her HEP.  She knows the stretching is helping her and she is feeling stronger.  She is noting that she can do more and is stronger overall.      Work: retired  Leisure: walking, working in yard (lives alone but has neighbors and nieces to assist if needed)  Equipment: [employer]                      History       Pattern of pain questions: Pattern 1    1.  Where is your pain the worst? back  2.  Is your pain constant or intermittent? Constant, varies in intensity  3.  Does bending forward make your typical pain worse? No, just increases "stretch"  4.  Since the start of your back pain, has there been a change in your bowel or bladder? no  5.  What can't you do now that you use to be able to do? Walking, HHCs, yardwork, working out      Objective     No environmental, cultural, spiritual, developmental or education needs expressed or noted    POSTURE at eval:  Sitting: slump sitting with FHP, rounded shoulders and increased C-curve in LSP  Standing: increased thoracic kyphosis, slight decrease in Lumbar lordosis, no shift    Correction of posture: " slimline  Relevant: yes    MOVEMENT LOSS 2/14/17:  Flexion:  WFL (fingertips to near ankle) IMP  Extension: limited    Pain: no,   Sidebending RIGHT: WFL      Pain: no  Sidebending LEFT:  WFL     Pain: no  Rotation RIGHT: WFL  Pain: no  Rotation LEFT: WFL  Pain: no    Repeated standing extensions: improved symptoms  Repeated prone extensions: slight pain, improved symptoms after decreasing ROM    Baseline IM Testing Results: 10/25/16  ROM: 0-33  Max Peak Torque: 111  Min Peak Torque: 21   Flex/ext ratio: 5.29:1    [facility] Testing Results: 1/4/17  ROM: 0-36 deg  MAx Peak Torque: 204 ftlbs  Min Peak Torque: 73 ftlbs  Flex Ext ratio:2.7:1 (imp)  Avg Change Sum of Forces: 171%       Treatment     Pt was instructed in and performed the following:  Cardiovascular exercise and therapeutic exercise to improve posture, lumbar spine and supporting musculature ROM, strength, and muscular endurance as follows:      [facility] 2/21/2017   Visit Number 18   VAS Pain Rating 4   Recumbent Bike Seat Pos. 19   Time 10   Flexion in Lying 10   Flexion in Sitting 10   Lumbar Weight 82   Repetitions 15   Rating of Perceived Exertion 4   Ice - Z Lie (in min.) 10       Peripheral muscle strengthening which included 1 set of 15-20 repetitions at a slow, controlled 7 second per rep pace focused on strengthening supporting musculature for improved body mechanics and functional mobility.  Pt and therapist focused on proper form during treatment to ensure optimal strengthening of each targeted muscle group.  Machines were utilized including torso rotation, leg extension, leg curl, chest press, upright row, triceps extension, biceps curls, leg press and hip abduction.     11/1/16  Tx exercises performed today: LTR, DKTC/MOUSTAPHA, SKTC, Supine pelvic tilts, Standing Pelvic tilts - 10x ea    11/17/16:  10 reps each LTR, PPT, and DKTC on ball    HEP as follows:   LTR, SKTC, Supine pelvic tilts - 10x ea set, 2-3 sets per day  Pt educated on  HEP and activity modifications to reduce c/o pain and improve overall function. Pt also educated on use of modalities prn to reduce c/o pain, dysfunction.    11/29/16:  Standing lumbar extensions 10x, 3x daily  Prone lumbar extensions 10x, 2xdaily      (patient has handouts and demonstrated and expressed understanding of the following)      Assessment       Pt present today with 4/10 LBP today pre session that decrease during and post session.  She reports she did  perform HEP exercises more over the last few days. She required minimal cueing for recall and performance of HEP exercises, with minimal vc's to decrease speed of performance and intensity of exercise to pain-free ROM. Pt tolerated a weight ^ on lumbar med x machine. Pt completed all peripheral resistance exercises with no reports of increased symptoms or discomfort. Pt is progressing well towards treatment goals and would benefit from continued skilled physical therapy treatment.     PT diagnosis: chronic low back pain 2* DDD    Pattern: Pattern 1    Medical necessity is demonstrated by the following problem list.  Pt presents with the following impairments: decreased trunk and hip strength, decreased trunk ROM, decreased postural awareness and endurance, decreased NM control/coordination. Current impairments limits patient with all functional activities. Patient requires skilled PT to address remaining deficits and return patient to [facility]. Based on the above history, physical examination, and baseline IM testing, an active physical therapy program is recommended.      Prognosis is: Good    GOALS: Pt is in agreement with the following goals.    Short term goals: (5 weeks or 10 visits)  1.  Pt will demonstrate increased lumbar ROM measured by med ex by at least 3 degrees from the initial ROM value with improvements noted in functional ROM and ability to perform ADLs MET  2.  Pt will demonstrate increased maximum isometric torque value by 5% when compared  to the initial value MET  3.  Pt will tolerate regular 5% increases in dynamic weight loads on all machines MET  4.  Patient report a reduction in worst pain score by 1-2 points for improved tolerance during work and recreational activities MET  5.  Pt able to perform HEP correctly with minimal cueing or supervision for therapist MET  6. Pt will demonstrate improvement in flexion/extension strength ratio compared in initial value MET    Long term goals: (10 weeks or 20 visits)  1. Pt will demonstrate increased lumbar ROM by at least 6 degrees from initial ROM value, resulting in improved ability to perform functional fwd bending while standing and sitting.    2. Pt will demonstrate increased maximum isometric torque value by 10% when compared to the initial value, resulting in improved ability to perform bending, lifting, and carrying activities safely, confidently, and 2/10 pain or less.   3. Pt will be able to ambulate community distances safely, confidently, and 2/10 pain or less.  4. Pt to demonstrate ability to independently control and reduce their pain through posture positioning and mechanical movements throughout typical work day.  5. Pt able to sleep through the night without waking with c/o pain.   6. Pt able to perform household cooking/cleaning ADLS safely, confidently, and 2/10 pain or less.  7. Pt to be able to perform self care and grooming ADLs safely, confidently, independently, and 2/10 pain or less.   8. Pt able to resume their preferred exercise regimen safely, confidently, and 2/10 pain or less.    9. Pt will be able to ascend/descend 1 flight of stairs reciprocally with use of unilateral handrail for safety, confidently and 2/10 pain or less.      Additional Specific patient expressed goals:  1. HHCs, Yardwork  2. Walking    OUTCOMES:  Functional Outcome Measure: FOTO limitation = 60% Disability  G-codes + Modifier + % Impairment: (Mobility):   Initial =  (60% - 80% disability), Goal =   (20% - 40% disability)  [place]:FOTO: 51% limitation      Plan     Continue OPPT per POC. See 2 more visits and plan for DC to wellness.

## 2017-02-22 ENCOUNTER — OFFICE VISIT (OUTPATIENT)
Dept: INTERNAL MEDICINE | Facility: CLINIC | Age: 77
End: 2017-02-22
Payer: MEDICARE

## 2017-02-22 ENCOUNTER — LAB VISIT (OUTPATIENT)
Dept: LAB | Facility: HOSPITAL | Age: 77
End: 2017-02-22
Attending: INTERNAL MEDICINE
Payer: MEDICARE

## 2017-02-22 VITALS
WEIGHT: 242.75 LBS | HEIGHT: 68 IN | SYSTOLIC BLOOD PRESSURE: 100 MMHG | DIASTOLIC BLOOD PRESSURE: 60 MMHG | BODY MASS INDEX: 36.79 KG/M2 | OXYGEN SATURATION: 96 % | HEART RATE: 70 BPM

## 2017-02-22 DIAGNOSIS — E66.9 OBESITY (BMI 30-39.9): ICD-10-CM

## 2017-02-22 DIAGNOSIS — I25.119 CORONARY ARTERY DISEASE INVOLVING NATIVE CORONARY ARTERY OF NATIVE HEART WITH ANGINA PECTORIS: ICD-10-CM

## 2017-02-22 DIAGNOSIS — I10 ESSENTIAL HYPERTENSION: Primary | ICD-10-CM

## 2017-02-22 DIAGNOSIS — I70.0 AORTIC ATHEROSCLEROSIS: ICD-10-CM

## 2017-02-22 DIAGNOSIS — N18.30 TYPE 2 DIABETES MELLITUS WITH STAGE 3 CHRONIC KIDNEY DISEASE, WITH LONG-TERM CURRENT USE OF INSULIN: ICD-10-CM

## 2017-02-22 DIAGNOSIS — E78.49 OTHER HYPERLIPIDEMIA: ICD-10-CM

## 2017-02-22 DIAGNOSIS — E11.42 DIABETIC POLYNEUROPATHY ASSOCIATED WITH TYPE 2 DIABETES MELLITUS: ICD-10-CM

## 2017-02-22 DIAGNOSIS — I77.1 TORTUOUS AORTA: ICD-10-CM

## 2017-02-22 DIAGNOSIS — K21.9 GASTROESOPHAGEAL REFLUX DISEASE WITHOUT ESOPHAGITIS: ICD-10-CM

## 2017-02-22 DIAGNOSIS — E11.22 TYPE 2 DIABETES MELLITUS WITH STAGE 3 CHRONIC KIDNEY DISEASE, WITH LONG-TERM CURRENT USE OF INSULIN: ICD-10-CM

## 2017-02-22 DIAGNOSIS — N18.30 CKD (CHRONIC KIDNEY DISEASE), STAGE III: ICD-10-CM

## 2017-02-22 DIAGNOSIS — I10 ESSENTIAL HYPERTENSION: Chronic | ICD-10-CM

## 2017-02-22 DIAGNOSIS — J30.9 ALLERGIC RHINITIS, UNSPECIFIED ALLERGIC RHINITIS TRIGGER, UNSPECIFIED RHINITIS SEASONALITY: ICD-10-CM

## 2017-02-22 DIAGNOSIS — Z79.4 TYPE 2 DIABETES MELLITUS WITH STAGE 3 CHRONIC KIDNEY DISEASE, WITH LONG-TERM CURRENT USE OF INSULIN: ICD-10-CM

## 2017-02-22 LAB
ALBUMIN SERPL BCP-MCNC: 3.4 G/DL
ALP SERPL-CCNC: 87 U/L
ALT SERPL W/O P-5'-P-CCNC: 15 U/L
ANION GAP SERPL CALC-SCNC: 12 MMOL/L
AST SERPL-CCNC: 18 U/L
BASOPHILS # BLD AUTO: 0.03 K/UL
BASOPHILS NFR BLD: 0.2 %
BILIRUB SERPL-MCNC: 0.3 MG/DL
BUN SERPL-MCNC: 9 MG/DL
CALCIUM SERPL-MCNC: 9.3 MG/DL
CHLORIDE SERPL-SCNC: 102 MMOL/L
CO2 SERPL-SCNC: 31 MMOL/L
CREAT SERPL-MCNC: 1 MG/DL
DIFFERENTIAL METHOD: ABNORMAL
EOSINOPHIL # BLD AUTO: 0.3 K/UL
EOSINOPHIL NFR BLD: 2.5 %
ERYTHROCYTE [DISTWIDTH] IN BLOOD BY AUTOMATED COUNT: 14.2 %
EST. GFR  (AFRICAN AMERICAN): >60 ML/MIN/1.73 M^2
EST. GFR  (NON AFRICAN AMERICAN): 54 ML/MIN/1.73 M^2
GLUCOSE SERPL-MCNC: 149 MG/DL
HCT VFR BLD AUTO: 39.3 %
HGB BLD-MCNC: 12.4 G/DL
LYMPHOCYTES # BLD AUTO: 2.5 K/UL
LYMPHOCYTES NFR BLD: 19.7 %
MCH RBC QN AUTO: 28.9 PG
MCHC RBC AUTO-ENTMCNC: 31.6 %
MCV RBC AUTO: 92 FL
MONOCYTES # BLD AUTO: 1.1 K/UL
MONOCYTES NFR BLD: 8.4 %
NEUTROPHILS # BLD AUTO: 8.6 K/UL
NEUTROPHILS NFR BLD: 68.9 %
PLATELET # BLD AUTO: 260 K/UL
PMV BLD AUTO: 9.8 FL
POTASSIUM SERPL-SCNC: 3.5 MMOL/L
PROT SERPL-MCNC: 7.5 G/DL
RBC # BLD AUTO: 4.29 M/UL
SODIUM SERPL-SCNC: 145 MMOL/L
WBC # BLD AUTO: 12.48 K/UL

## 2017-02-22 PROCEDURE — 3078F DIAST BP <80 MM HG: CPT | Mod: S$GLB,,, | Performed by: INTERNAL MEDICINE

## 2017-02-22 PROCEDURE — 99499 UNLISTED E&M SERVICE: CPT | Mod: S$GLB,,, | Performed by: INTERNAL MEDICINE

## 2017-02-22 PROCEDURE — 83036 HEMOGLOBIN GLYCOSYLATED A1C: CPT

## 2017-02-22 PROCEDURE — 80053 COMPREHEN METABOLIC PANEL: CPT

## 2017-02-22 PROCEDURE — 99999 PR PBB SHADOW E&M-EST. PATIENT-LVL V: CPT | Mod: PBBFAC,,, | Performed by: INTERNAL MEDICINE

## 2017-02-22 PROCEDURE — 36415 COLL VENOUS BLD VENIPUNCTURE: CPT

## 2017-02-22 PROCEDURE — 3074F SYST BP LT 130 MM HG: CPT | Mod: S$GLB,,, | Performed by: INTERNAL MEDICINE

## 2017-02-22 PROCEDURE — 1159F MED LIST DOCD IN RCRD: CPT | Mod: S$GLB,,, | Performed by: INTERNAL MEDICINE

## 2017-02-22 PROCEDURE — 1157F ADVNC CARE PLAN IN RCRD: CPT | Mod: S$GLB,,, | Performed by: INTERNAL MEDICINE

## 2017-02-22 PROCEDURE — 99214 OFFICE O/P EST MOD 30 MIN: CPT | Mod: S$GLB,,, | Performed by: INTERNAL MEDICINE

## 2017-02-22 PROCEDURE — 85025 COMPLETE CBC W/AUTO DIFF WBC: CPT

## 2017-02-22 PROCEDURE — 1160F RVW MEDS BY RX/DR IN RCRD: CPT | Mod: S$GLB,,, | Performed by: INTERNAL MEDICINE

## 2017-02-22 RX ORDER — ACETAMINOPHEN 500 MG/1
CAPSULE, LIQUID FILLED ORAL
COMMUNITY
Start: 2017-02-06 | End: 2017-05-17

## 2017-02-22 RX ORDER — OXYCODONE AND ACETAMINOPHEN 10; 325 MG/1; MG/1
1 TABLET ORAL
Qty: 150 TABLET | Refills: 0 | Status: SHIPPED | OUTPATIENT
Start: 2017-02-22 | End: 2017-05-17 | Stop reason: SDUPTHER

## 2017-02-22 RX ORDER — LORATADINE 10 MG/1
10 TABLET ORAL DAILY
Qty: 90 TABLET | Refills: 4 | COMMUNITY
Start: 2017-02-22 | End: 2017-02-22 | Stop reason: SDUPTHER

## 2017-02-22 RX ORDER — OXYCODONE AND ACETAMINOPHEN 10; 325 MG/1; MG/1
1 TABLET ORAL
Qty: 150 TABLET | Refills: 0 | Status: SHIPPED | OUTPATIENT
Start: 2017-03-22 | End: 2017-05-17 | Stop reason: SDUPTHER

## 2017-02-22 RX ORDER — ESOMEPRAZOLE MAGNESIUM 40 MG/1
CAPSULE, DELAYED RELEASE ORAL
COMMUNITY
Start: 2017-02-07 | End: 2017-05-17

## 2017-02-22 RX ORDER — BACLOFEN 10 MG/1
TABLET ORAL
Qty: 90 TABLET | Refills: 3 | Status: SHIPPED | OUTPATIENT
Start: 2017-02-22 | End: 2018-01-25

## 2017-02-22 RX ORDER — OXYCODONE AND ACETAMINOPHEN 10; 325 MG/1; MG/1
1 TABLET ORAL
Qty: 150 TABLET | Refills: 0 | Status: SHIPPED | OUTPATIENT
Start: 2017-04-22 | End: 2017-05-17 | Stop reason: SDUPTHER

## 2017-02-22 RX ORDER — METHYL SALICYLATE/MENTH/CAMPH 30%-10%-4%
CREAM (GRAM) TOPICAL
COMMUNITY
Start: 2017-02-06 | End: 2020-01-30

## 2017-02-22 RX ORDER — LORATADINE 10 MG/1
10 TABLET ORAL DAILY
Qty: 90 TABLET | Refills: 4 | Status: SHIPPED | OUTPATIENT
Start: 2017-02-22 | End: 2018-04-03 | Stop reason: SDUPTHER

## 2017-02-22 RX ORDER — PEN NEEDLE, DIABETIC 29 G X1/2"
NEEDLE, DISPOSABLE MISCELLANEOUS
Qty: 400 EACH | Refills: 4 | Status: SHIPPED | OUTPATIENT
Start: 2017-02-22 | End: 2021-02-11 | Stop reason: SDUPTHER

## 2017-02-22 NOTE — PROGRESS NOTES
CHIEF COMPLAINT: Follow up of allergies, back pain, diabetes, hypertension      HISTORY OF PRESENT ILLNESS: This is a 77-year-old woman who presents for follow up of above.      She had worsened sinus congestion in  January. Her sinuses are doing well now.     She saw Dr Le 1/31/17 and she [facility]. Her back is doing a little better. She is now walking without a cane. She has occasional left hip pain.  She is taking gabapentin 100 mg 3 capsules three times and 3 at bedtime. Dose has been increased due to foot pain. Foot pain is better on higher dose of gabapentin.  She takes oxycodone apap 5/325 one tablet up to 5 times daily which keeps her back pain under control. She has to take oxycodone apap when she is active and does things. If she does not go out, she does not have have to take as much oxycodone apap.      Breathing has been doing ok. She has been using a humidifier which has helped.  She smoked 1-1.5 packs per day for about 25 years. She quit 15 years ago.     She has had some abdominal bloating. She feels that she needs to belch. She has not had a lot of gas. BM helps. Gas X helps.  No nausea, vomiting, constipation, diarrhea      She continues to take amlodipine 10 mg daily, aspirin 81 mg daily, coreg 25 mg twice daily and lasix 40 mg twice daily, and KCL 10 meq once daily for her diastolic dysfunction and hypertension. She denies lightheadedness. No chest pain or shortness of breath. She is now taking atorvastatin 40 mg once daily for her cholesterol.      She is taking lantus 59 units at night and Novolog 23 units with meals. Blood sugars have been fine. No hypoglycemia.        PAST MEDICAL HISTORY:   1. Diabetes mellitus   2. Hypertension   3. Hyperlipidemia   4. Left heart catheterization January 2007 which revealed luminal irregularities in the LAD, left circumflex and right coronary artery. She had diastolic dysfunction and patent renal arteries.   5.  "Sleep apnea   6. Obesity.   7. Nephrolithiasis status post lithotripsy.   8. Reflux - had nonerosive gastropathy on EGD September 2007. Gastritis on EGD 9/2010  9. Hidradenitis suppurativa.   10. History of diverticulitis with a hospitalized October 2007.   11. Migraines   12. Obesity.   13. 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    14. Total hysterectomy in 1969.   15. Cholecystectomy was done at that time as well.   16. 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   17. Colon polyps on colonscopy 11/2010 - due 2013   18. Hospitalization 12/10 for e coli sepsis due to left ureteral stone with left nephrostomy tube in [place], [place]   19. Left knee replacement 9/2012      MEDICATIONS and ALLERGIES: Updated on EPIC.     PHYSICAL EXAMINATION:      Visit Vitals    /60    Pulse 70    Ht 5' 8" (1.727 m)    Wt 110.1 kg (242 lb 11.6 oz)    SpO2 96%    BMI 36.91 kg/m2        GENERAL: She is alert, oriented, no apparent distress. Affect within   normal limits.  Conjunctiva anicteric. . Oropharynx clear. Swelling of the left lower lip  NECK: Supple.   Respiratory: Effort normal. Lungs clear  HEART: Regular rate and rhythm without murmurs, gallops or rubs.   No lower extremity edema.     ABDOMEN: soft, non distended, non tender, bowel sounds present, no hepatosplenomgaly      ASSESSMENT AND PLAN:   1. Allergic rhinitis - continue zyrtec and flonase  2. Bilateral feet pain due to neuropathy- better with higher dose of gabapentin   2. Hypertension - stable.    3. Insulin dependent Diabetes -in empowerment - doing better.    4. Hyperlipidemia - on lipitor  5. GERD - asx  6. Allergic rhinitis -try flonase  7. Diastolic congestive heart failure - stable.    8. CRI -labs  9. Obesity - discussed diet, exercise and weight loss  10. CAD -risk factor modification  11. Aortic atherosclerosis - risk factor modification  12. Tortuous aorta - HTN " controlled  13. Colon polyps - Colonoscopy in 8/2013 - 2 polyps. Flex sig 11/2013 - mild granular mucosa. Recommended colonoscopy in 8/2018. Attempted colonoscopy 11/2016 du to diarrhea which has resolved. She had poor prep with hyperplastic rectal biopsy.   MMG 10/16  Prevnar 10/15 and flu shot 11/16  I will see her back 3 months. sooner if problems

## 2017-02-22 NOTE — MR AVS SNAPSHOT
"    [facility]  [address]  [facility] [address]  Phone: [phone]  Fax: [phone]                  Henrietta Villasenor   2017 10:00 AM   Office Visit    Description:  Female : 1940   Provider:  Jeannine Arriaga MD   Department:  [facility] Medicine           Reason for Visit     Follow-up           Diagnoses this Visit        Comments    Essential hypertension    -  Primary            To Do List           Future Appointments        Provider Department Dept Phone    2017 12:30 PM Claudine Winter, PT [facility] [phone]    3/2/2017 10:00 AM Claudine Winter, PT [facility] [phone]    3/10/2017 10:00 AM GEORGIANA Malagon,ANP-C [facility] - Endocrinology [phone]    4/3/2017 9:00 AM Laura Sheridan DPM [facility] [phone]    2017 10:30 AM Jeannine Arriaga MD [facility] [phone]      Goals (5 Years of Data)     None       These Medications        Disp Refills Start End    oxycodone-acetaminophen (PERCOCET)  mg per tablet 150 tablet 0 2017     Take 1 tablet by mouth 5 (five) times daily. Dose increased from 4 times daily to 5 times daily. Please fill early - Oral    Pharmacy: [facility] [address] Ph #: [phone]       oxycodone-acetaminophen (PERCOCET)  mg per tablet 150 tablet 0 3/22/2017     Take 1 tablet by mouth 5 (five) times daily. - Oral    Pharmacy: [facility] [address] Ph #: [phone]       oxycodone-acetaminophen (PERCOCET)  mg per tablet 150 tablet 0 2017     Take 1 tablet by mouth 5 (five) times daily. - Oral    Pharmacy: [facility] [address] Ph #: [phone]       pen needle, diabetic (SURE-FINE PEN NEEDLES) 29 gauge x 1/2" Ndle 400 each 4 2017     Use 4 " "times daily    Pharmacy: [facility] - [place], [place] - [address] Ph #: [phone]       baclofen (LIORESAL) 10 MG tablet 90 tablet 3 2/22/2017 1/2 - 1 tablet at bedtime as needed for muscle spasm    Pharmacy: [facility] - [facility] [address] Ph #: [phone]         PURCHASE these Medications (No prescription required)        Start End    loratadine (CLARITIN) 10 mg tablet 2/22/2017 2/22/2018    Sig - Route: Take 1 tablet (10 mg total) by mouth once daily. - Oral    Class: OTC      [facility]     [facility] On Call Nurse Care Line - 24/7 Assistance  Registered nurses in the [facility] On Call Center provide clinical advisement, health education, appointment booking, and other advisory services.  Call for this free service at [phone].             Medications           Message regarding Medications     Verify the changes and/or additions to your medication regime listed below are the same as discussed with your clinician today.  If any of these changes or additions are incorrect, please notify your healthcare provider.        START taking these NEW medications        Refills    loratadine (CLARITIN) 10 mg tablet 4    Sig: Take 1 tablet (10 mg total) by mouth once daily.    Class: OTC    Route: Oral    pen needle, diabetic (SURE-FINE PEN NEEDLES) 29 gauge x 1/2" Ndle 4    Sig: Use 4 times daily    Class: Normal           Verify that the below list of medications is an accurate representation of the medications you are currently taking.  If none reported, the list may be blank. If incorrect, please contact your healthcare provider. Carry this list with you in case of emergency.           Current Medications     [facility]     ACCU-CHEK SMARTVIEW TEST STRIP Strp TEST THREE TIMES DAILY    acetaminophen (TYLENOL) 500 mg Cap     albuterol (PROAIR HFA) 90 mcg/actuation inhaler Inhale 2 puffs into the lungs every 4 (four) hours as " "needed. 2 HFA Aerosol Inhaler Inhalation Every 4-6 hours. Ventolin or PRoair for asthma    albuterol 90 mcg/actuation inhaler Inhale 2 puffs into the lungs every 6 (six) hours as needed for Wheezing.    ALCOHOL ANTISEPTIC PADS (ALCOHOL PREP PADS TOP)     amlodipine (NORVASC) 10 MG tablet TAKE 1 TABLET EVERY MORNING.    aspirin (ECOTRIN) 81 MG EC tablet Take 1 tablet by mouth Daily.    atorvastatin (LIPITOR) 40 MG tablet     baclofen (LIORESAL) 10 MG tablet 1/2 - 1 tablet at bedtime as needed for muscle spasm    BD ALCOHOL SWAB PadM     BD ULTRA-FINE OLU PEN NEEDLES 32 x 5/32 " Ndle Uses 4 times daily, on multiple daily insulin injections    carvedilol (COREG) 25 MG tablet TAKE 1 TABLET TWICE DAILY    cholecalciferol, vitamin D3, (VITAMIN D) 2,000 unit Cap Take by mouth. 1 Capsule Oral Every morning    duloxetine (CYMBALTA) 60 MG capsule Take 1 capsule (60 mg total) by mouth once daily.    esomeprazole (NEXIUM) 40 MG capsule     ferrous sulfate 325 mg (65 mg iron) Tab tablet Take 1 tablet (325 mg total) by mouth once daily.    FIRST AID CLOTH TAPE 1 X 10 "-yard Tape     FIRST AID TAPE 1 X 2.3 "-yard Bndg     fluticasone (FLONASE) 50 mcg/actuation nasal spray SHAKE LQ AND U 2 SPRAYS IEN QD    furosemide (LASIX) 40 MG tablet TAKE 1 TABLET twice daily    gabapentin (NEURONTIN) 100 MG capsule TAKE 1 CAPSULE THREE TIMES DAILY  AND TAKE 3 CAPSULES AT BEDTIME    insulin glargine (LANTUS SOLOSTAR) 100 unit/mL (3 mL) InPn pen Inject 54 Units into the skin every evening.    insulin lispro (HUMALOG KWIKPEN) 100 unit/mL InPn pen Inject 23 Units into the skin 3 (three) times daily with meals. Plus correction scale, for max TDD 84 units daily    Lactobacillus acidophilus (PROBIOTIC) 10 billion cell Cap Take 1 capsule by mouth once daily.    methylPREDNISolone (MEDROL DOSEPACK) 4 mg tablet use as directed    oxycodone-acetaminophen (PERCOCET)  mg per tablet Take 1 tablet by mouth 5 (five) times daily. Dose increased from 4 " "times daily to 5 times daily. Please fill early    oxycodone-acetaminophen (PERCOCET)  mg per tablet Starting on Mar 22, 2017. Take 1 tablet by mouth 5 (five) times daily.    oxycodone-acetaminophen (PERCOCET)  mg per tablet Starting on Apr 22, 2017. Take 1 tablet by mouth 5 (five) times daily.    pantoprazole (PROTONIX) 40 MG tablet TAKE 1 TABLET(40 MG) BY MOUTH EVERY DAY    promethazine (PHENERGAN) 6.25 mg/5 mL syrup Take 5 mLs (6.25 mg total) by mouth every 4 (four) hours as needed (itching & congestion).    valacyclovir (VALTREX) 500 MG tablet TAKE 1 TABLET ONE TIME DAILY    amoxicillin-clavulanate 875-125mg (AUGMENTIN) 875-125 mg per tablet Take 1 tablet by mouth every 12 (twelve) hours.    benzonatate (TESSALON) 200 MG capsule Take 1 capsule (200 mg total) by mouth every evening.    dicyclomine (BENTYL) 10 MG capsule Take 1 capsule (10 mg total) by mouth 3 (three) times daily as needed (abdominal pain).    loratadine (CLARITIN) 10 mg tablet Take 1 tablet (10 mg total) by mouth once daily.    MUSCLE RUB, WITH CAMPHOR, 4-30-10 % Crea     nitroGLYCERIN (NITROSTAT) 0.4 MG SL tablet Place 1 tablet (0.4 mg total) under the tongue every 5 (five) minutes as needed.    pen needle, diabetic (SURE-FINE PEN NEEDLES) 29 gauge x 1/2" Ndle Use 4 times daily    triamcinolone acetonide 0.1% (KENALOG) 0.1 % cream Apply topically 3 (three) times daily.           Clinical Reference Information           Your Vitals Were     BP Pulse Height Weight SpO2 BMI    100/60 70 5' 8" (1.727 m) 110.1 kg (242 lb 11.6 oz) 96% 36.91 kg/m2      Blood Pressure          Most Recent Value    BP  100/60      Allergies as of 2/22/2017     Crestor [Rosuvastatin]    Ezetimibe    Hydrocodone    Lisinopril    Sulfa (Sulfonamide Antibiotics)    Sulfamethoxazole    Sulfamethoxazole-trimethoprim    Trimethoprim    Valsartan      Immunizations Administered on Date of Encounter - 2/22/2017     None      Orders Placed During Today's Visit     " Future Labs/Procedures Expected by Expires    CBC auto differential  As directed 2/22/2018    Comprehensive metabolic panel  As directed 2/22/2018      [facility] Sign-Up     Activating your [facility] account is as easy as 1-2-3!     1) Visit [email or web address], select Sign Up Now, enter this activation code and your date of birth, then select Next.  Activation code not generated  Current Patient Portal Status: Account disabled      2) Create a username and password to use when you visit [facility] in the future and select a security question in case you lose your password and select Next.    3) Enter your e-mail address and click Sign Up!    Additional Information  If you have questions, please e-mail [email or web address] or call [phone] to talk to our [email or web address] staff. Remember, [facility] is NOT to be used for urgent needs. For medical emergencies, dial 911.         Language Assistance Services     ATTENTION: Language assistance services are available, free of charge. Please call [phone].      ATENCIÓN: Si habla español, tiene a collado disposición servicios gratuitos de asistencia lingüística. Llame al [phone].     CHÚ Ý: N?u b?n nói Ti?ng Vi?t, có các d?ch v? h? tr? ngôn ng? mi?n phí dành cho b?n. G?i s? [phone].         [facility] complies with applicable Federal civil rights laws and does not discriminate on the basis of race, color, national origin, age, disability, or sex.

## 2017-02-23 ENCOUNTER — CLINICAL SUPPORT (OUTPATIENT)
Dept: REHABILITATION | Facility: OTHER | Age: 77
End: 2017-02-23
Attending: PHYSICAL MEDICINE & REHABILITATION
Payer: MEDICARE

## 2017-02-23 DIAGNOSIS — M51.36 DDD (DEGENERATIVE DISC DISEASE), LUMBAR: Primary | ICD-10-CM

## 2017-02-23 LAB
ESTIMATED AVG GLUCOSE: 186 MG/DL
HBA1C MFR BLD HPLC: 8.1 %

## 2017-02-23 PROCEDURE — 97110 THERAPEUTIC EXERCISES: CPT | Performed by: PHYSICAL THERAPIST

## 2017-02-23 NOTE — PROGRESS NOTES
Patient, Henrietat Villasenor (MRN #[id number]), presented with a recorded BMI of 36.91 kg/m^2 and a documented comorbidity(s):  - Diabetes Mellitus Type 2  - Hypertension  to which the severe obesity is a contributing factor. This is consistent with the definition of severe obesity (BMI 35.0-35.9) with comorbidity (ICD-10 E66.01, Z68.35). The patient's severe obesity was monitored, evaluated, addressed and/or treated. This addendum to the medical record is made on 02/23/2017.

## 2017-02-23 NOTE — PROGRESS NOTES
"[facility]   LUMBAR TREATMENT NOTE VISIT 19      Date: 2/23/2017    Time: 1230-130 pm  Precautions: L TKA (2014), Hx CA L face, CTS Mynor, gout    Pattern: Pattern 1 , no preference    Eval date: 10/25/16  Reassessment due: 11/25/16, done 11/29/16  Reassessment due: 12/29/16, done 1/4/17  Next reassessment due: 2/4/17, done on next visit 2/14/17  Next reassessment due: 3/14/17    POC signed....10/25/16  Next POC due...01/25/17, sent 1/31/17  Next POC due: 3/3/17    PT/PTA face to face conference: 10/27/16 done  Conference due: 11/17/16 Pt seen by a PT with the session. 1/10/17 done  Conference due: 2/10/17 done 2/21/17   Conference due: 2/31/17      Subjective     Pt presents today with 0/10 LBP before and  0/10 after treatment. She continues to feel better overall and is pleased with her progress.   She states that she is back to being able to perform her HEP.  She knows the stretching is helping her and she is feeling stronger.  She is noting that she can do more and is stronger overall.      Work: retired  Leisure: walking, working in yard (lives alone but has neighbors and nieces to assist if needed)  Equipment: [employer]                      History       Pattern of pain questions: Pattern 1    1.  Where is your pain the worst? back  2.  Is your pain constant or intermittent? Constant, varies in intensity  3.  Does bending forward make your typical pain worse? No, just increases "stretch"  4.  Since the start of your back pain, has there been a change in your bowel or bladder? no  5.  What can't you do now that you use to be able to do? Walking, HHCs, yardwork, working out      Objective     No environmental, cultural, spiritual, developmental or education needs expressed or noted    POSTURE at eval:  Sitting: slump sitting with FHP, rounded shoulders and increased C-curve in LSP  Standing: increased thoracic kyphosis, slight decrease in Lumbar lordosis, no shift    Correction of " posture: slimline  Relevant: yes    MOVEMENT LOSS 2/14/17:  Flexion:  WFL (fingertips to near ankle) IMP  Extension: limited    Pain: no,   Sidebending RIGHT: WFL      Pain: no  Sidebending LEFT:  WFL     Pain: no  Rotation RIGHT: WFL  Pain: no  Rotation LEFT: WFL  Pain: no    Repeated standing extensions: improved symptoms  Repeated prone extensions: slight pain, improved symptoms after decreasing ROM    Baseline IM Testing Results: 10/25/16  ROM: 0-33  Max Peak Torque: 111  Min Peak Torque: 21   Flex/ext ratio: 5.29:1    Mid Point IM Testing Results: 1/4/17  ROM: 0-36 deg  MAx Peak Torque: 204 ftlbs  Min Peak Torque: 73 ftlbs  Flex Ext ratio:2.7:1 (imp)  Avg Change Sum of Forces: 171%       Treatment     Pt was instructed in and performed the following:  Cardiovascular exercise and therapeutic exercise to improve posture, lumbar spine and supporting musculature ROM, strength, and muscular endurance as follows:    [facility] 2/23/2017   Visit Number 19   VAS Pain Rating 0   Recumbent Bike Seat Pos. 19   Time 10   Flexion in Lying 10   Flexion in Sitting 10   Lumbar Weight 82   Repetitions 20   Rating of Perceived Exertion 6   Ice - Z Lie (in min.) 10         Peripheral muscle strengthening which included 1 set of 15-20 repetitions at a slow, controlled 7 second per rep pace focused on strengthening supporting musculature for improved body mechanics and functional mobility.  Pt and therapist focused on proper form during treatment to ensure optimal strengthening of each targeted muscle group.  Machines were utilized including torso rotation, leg extension, leg curl, chest press, upright row, triceps extension, biceps curls, leg press and hip abduction.     11/1/16  Tx exercises performed today: LTR, DKTC/MOUSTAPHA, SKTC, Supine pelvic tilts, Standing Pelvic tilts - 10x ea    11/17/16:  10 reps each LTR, PPT, and DKTC on ball    HEP as follows:   LTR, SKTC, Supine pelvic tilts - 10x ea set, 2-3 sets per day  Pt  educated on HEP and activity modifications to reduce c/o pain and improve overall function. Pt also educated on use of modalities prn to reduce c/o pain, dysfunction.    11/29/16:  Standing lumbar extensions 10x, 3x daily  Prone lumbar extensions 10x, 2xdaily      (patient has handouts and demonstrated and expressed understanding of the following)      Assessment       Pt present today with 0/10 LBP today pre and post session.  She reports she did  perform HEP exercises more over the last few days. She required minimal cueing for recall and performance of HEP exercises, with minimal vc's to decrease speed of performance and intensity of exercise to pain-free ROM. Pt tolerated a weight  at 82 ftlbs on lumbar med x machine, RPE 6. Pt completed all peripheral resistance exercises with no reports of increased symptoms or discomfort. Pt is progressing well towards treatment goals and would benefit from continued skilled physical therapy treatment. Will see one time more and progress to wellness. She is pleased with her progress and is looking forward to wellness.     PT diagnosis: chronic low back pain 2* DDD    Pattern: Pattern 1    Medical necessity is demonstrated by the following problem list.  Pt presents with the following impairments: decreased trunk and hip strength, decreased trunk ROM, decreased postural awareness and endurance, decreased NM control/coordination. Current impairments limits patient with all functional activities. Patient requires skilled PT to address remaining deficits and return patient to OF. Based on the above history, physical examination, and baseline IM testing, an active physical therapy program is recommended.      Prognosis is: Good    GOALS: Pt is in agreement with the following goals.    Short term goals: (5 weeks or 10 visits)  1.  Pt will demonstrate increased lumbar ROM measured by med ex by at least 3 degrees from the initial ROM value with improvements noted in functional ROM  and ability to perform ADLs MET  2.  Pt will demonstrate increased maximum isometric torque value by 5% when compared to the initial value MET  3.  Pt will tolerate regular 5% increases in dynamic weight loads on all machines MET  4.  Patient report a reduction in worst pain score by 1-2 points for improved tolerance during work and recreational activities MET  5.  Pt able to perform HEP correctly with minimal cueing or supervision for therapist MET  6. Pt will demonstrate improvement in flexion/extension strength ratio compared in initial value MET    Long term goals: (10 weeks or 20 visits)  1. Pt will demonstrate increased lumbar ROM by at least 6 degrees from initial ROM value, resulting in improved ability to perform functional fwd bending while standing and sitting.    2. Pt will demonstrate increased maximum isometric torque value by 10% when compared to the initial value, resulting in improved ability to perform bending, lifting, and carrying activities safely, confidently, and 2/10 pain or less.   3. Pt will be able to ambulate community distances safely, confidently, and 2/10 pain or less.  4. Pt to demonstrate ability to independently control and reduce their pain through posture positioning and mechanical movements throughout typical work day.  5. Pt able to sleep through the night without waking with c/o pain.   6. Pt able to perform household cooking/cleaning ADLS safely, confidently, and 2/10 pain or less.  7. Pt to be able to perform self care and grooming ADLs safely, confidently, independently, and 2/10 pain or less.   8. Pt able to resume their preferred exercise regimen safely, confidently, and 2/10 pain or less.    9. Pt will be able to ascend/descend 1 flight of stairs reciprocally with use of unilateral handrail for safety, confidently and 2/10 pain or less.      Additional Specific patient expressed goals:  1. HHCs, Yardwork  2. Walking    OUTCOMES:  Functional Outcome Measure: FOTO  limitation = 60% Disability  G-codes + Modifier + % Impairment: (Mobility):   Initial =  (60% - 80% disability), Goal =  (20% - 40% disability)  Mid Point:FOTO: 51% limitation      Plan      See 1 more visit and plan for DC to wellness.

## 2017-02-24 RX ORDER — CARVEDILOL 25 MG/1
TABLET ORAL
Qty: 180 TABLET | Refills: 3 | Status: SHIPPED | OUTPATIENT
Start: 2017-02-24 | End: 2017-11-30 | Stop reason: SDUPTHER

## 2017-03-02 ENCOUNTER — CLINICAL SUPPORT (OUTPATIENT)
Dept: REHABILITATION | Facility: OTHER | Age: 77
End: 2017-03-02
Attending: PHYSICAL MEDICINE & REHABILITATION
Payer: MEDICARE

## 2017-03-02 DIAGNOSIS — M51.36 DDD (DEGENERATIVE DISC DISEASE), LUMBAR: Primary | ICD-10-CM

## 2017-03-02 PROCEDURE — 97110 THERAPEUTIC EXERCISES: CPT | Performed by: PHYSICAL THERAPIST

## 2017-03-02 PROCEDURE — G8979 MOBILITY GOAL STATUS: HCPCS | Mod: CJ | Performed by: PHYSICAL THERAPIST

## 2017-03-02 PROCEDURE — G8980 MOBILITY D/C STATUS: HCPCS | Mod: CK | Performed by: PHYSICAL THERAPIST

## 2017-03-02 NOTE — PROGRESS NOTES
"[facility]   LUMBAR TREATMENT NOTE VISIT 19      Date: 3/2/2017    Time: 1230-130 pm  Precautions: L TKA (2014), Hx CA L face, CTS Mynor, gout    Pattern: Pattern 1 , no preference    Eval date: 10/25/16  Reassessment due: 11/25/16, done 11/29/16  Reassessment due: 12/29/16, done 1/4/17  Next reassessment due: 2/4/17, done on next visit 2/14/17  Next reassessment due: 3/14/17    POC signed....10/25/16  Next POC due...01/25/17, sent 1/31/17  Next POC due: 3/3/17    PT/PTA face to face conference: 10/27/16 done  Conference due: 11/17/16 Pt seen by a PT with the session. 1/10/17 done  Conference due: 2/10/17 done 2/21/17   Conference due: 2/31/17      Subjective     Pt presents today with 0/10 LBP before and  0/10 after treatment. She continues to feel better overall and is pleased with her progress. Unfortunately, pt was sick to her stomach prior to coming to PT today and is not feeling well.  COntinue with Final IM Test only and no peripheral strengthening.  After the test, pt went to the restroom to throw up.  Will call to schedule for wellness and finish visit.        Work: retired  Leisure: walking, working in yard (lives alone but has neighbors and nieces to assist if needed)  Equipment: [employer]                      History       Pattern of pain questions: Pattern 1    1.  Where is your pain the worst? back  2.  Is your pain constant or intermittent? Constant, varies in intensity  3.  Does bending forward make your typical pain worse? No, just increases "stretch"  4.  Since the start of your back pain, has there been a change in your bowel or bladder? no  5.  What can't you do now that you use to be able to do? Walking, HHCs, yardwork, working out      Objective     No environmental, cultural, spiritual, developmental or education needs expressed or noted    POSTURE at eval:  Sitting: slump sitting with FHP, rounded shoulders and increased C-curve in LSP  Standing: increased thoracic " kyphosis, slight decrease in Lumbar lordosis, no shift    Correction of posture: slimline  Relevant: yes    MOVEMENT LOSS 3/2/17:  Flexion:  WFL (fingertips to near ankle) IMP  Extension: limited    Pain: no,   Sidebending RIGHT: WFL      Pain: no  Sidebending LEFT:  WFL     Pain: no  Rotation RIGHT: WFL  Pain: no  Rotation LEFT: WFL  Pain: no    Repeated standing extensions: improved symptoms  Repeated prone extensions: slight pain, improved symptoms after decreasing ROM    Baseline IM Testing Results: 10/25/16  ROM: 0-33  Max Peak Torque: 111  Min Peak Torque: 21   Flex/ext ratio: 5.29:1    [facility] IM Testing Results: 1/4/17  ROM: 0-36 deg  MAx Peak Torque: 204 ftlbs  Min Peak Torque: 73 ftlbs  Flex Ext ratio:2.7:1 (imp)  Avg Change Sum of Forces: 171%     Final IM Testing Results:  ROM: 0-42  Max Peak Torque:250 ftlbs  Min Peak Torque: 84 ftlbs  Flex Ext ratio:3.1  Avg Change sum of forces:213%      Treatment     Pt was instructed in and performed the following:  Cardiovascular exercise and therapeutic exercise to improve posture, lumbar spine and supporting musculature ROM, strength, and muscular endurance as follows:    [facility] 3/2/2017   Visit Number 20   VAS Pain Rating 0   Recumbent Bike Seat Pos. 19   Time 5   Flexion in Lying 0   Flexion in Sitting 0   Lumbar Flexion 42   Lumbar Extension 0   Lumbar Peak Torque 250   Ice - Z Lie (in min.) 0         Peripheral muscle strengthening deferred today as she was feeling sick to her stomach prior to coming to PT today.       11/1/16  Tx exercises performed today: LTR, DKTC/MOUSTAPHA, SKTC, Supine pelvic tilts, Standing Pelvic tilts - 10x ea    11/17/16:  10 reps each LTR, PPT, and DKTC on ball    HEP as follows:   LTR, SKTC, Supine pelvic tilts - 10x ea set, 2-3 sets per day  Pt educated on HEP and activity modifications to reduce c/o pain and improve overall function. Pt also educated on use of modalities prn to reduce c/o pain,  dysfunction.    11/29/16:  Standing lumbar extensions 10x, 3x daily  Prone lumbar extensions 10x, 2xdaily      (patient has handouts and demonstrated and expressed understanding of the following)      Assessment       Pt present today with 0/10 LBP today pre and post session. Pt to clinic stating that she had thrown up earlier today, not feeling well. She required minimal cueing for recall and performance of HEP exercises, with minimal vc's to decrease speed of performance and intensity of exercise to pain-free ROM.  Deferred  all peripheral resistance exercises today due to pt not feeling well.  Pt ended up throwing up here after her Final IM Test.  Offered medical assistance or to call a family member.  Pt stated she was fine to drive after resting for a while in clinic.  Excellent increase in ROM and strength on Final IM testing. She is pleased with her progress and is looking forward to wellness.     PT diagnosis: chronic low back pain 2* DDD    Pattern: Pattern 1    Medical necessity is demonstrated by the following problem list.  Pt presents with the following impairments: decreased trunk and hip strength, decreased trunk ROM, decreased postural awareness and endurance, decreased NM control/coordination. Current impairments limits patient with all functional activities. Patient requires skilled PT to address remaining deficits and return patient to OF. Based on the above history, physical examination, and baseline IM testing, an active physical therapy program is recommended.      Prognosis is: Good    GOALS: Pt is in agreement with the following goals.    Short term goals: (5 weeks or 10 visits)  1.  Pt will demonstrate increased lumbar ROM measured by med ex by at least 3 degrees from the initial ROM value with improvements noted in functional ROM and ability to perform ADLs MET  2.  Pt will demonstrate increased maximum isometric torque value by 5% when compared to the initial value MET  3.  Pt will  tolerate regular 5% increases in dynamic weight loads on all machines MET  4.  Patient report a reduction in worst pain score by 1-2 points for improved tolerance during work and recreational activities MET  5.  Pt able to perform HEP correctly with minimal cueing or supervision for therapist MET  6. Pt will demonstrate improvement in flexion/extension strength ratio compared in initial value MET    Long term goals: (10 weeks or 20 visits)  1. Pt will demonstrate increased lumbar ROM by at least 6 degrees from initial ROM value, resulting in improved ability to perform functional fwd bending while standing and sitting.  MET  2. Pt will demonstrate increased maximum isometric torque value by 10% when compared to the initial value, resulting in improved ability to perform bending, lifting, and carrying activities safely, confidently, and 2/10 pain or less. MET  3. Pt will be able to ambulate community distances safely, confidently, and 2/10 pain or less.MET  4. Pt to demonstrate ability to independently control and reduce their pain through posture positioning and mechanical movements throughout typical work day.MET  5. Pt able to sleep through the night without waking with c/o pain. IMP  6. Pt able to perform household cooking/cleaning ADLS safely, confidently, and 2/10 pain or less.IMP  7. Pt to be able to perform self care and grooming ADLs safely, confidently, independently, and 2/10 pain or less. IMP  8. Pt able to resume their preferred exercise regimen safely, confidently, and 2/10 pain or less.  NOT MET  9. Pt will be able to ascend/descend 1 flight of stairs reciprocally with use of unilateral handrail for safety, confidently and 2/10 pain or less.  MET    Additional Specific patient expressed goals:  1. HHCs, Yardwork  2. Walking MET    OUTCOMES:  Functional Outcome Measure: FOTO limitation = 60% Disability  G-codes + Modifier + % Impairment: (Mobility):   Initial =  (60% - 80% disability), Goal =   (20% - 40% disability)  [facility] [place]:FOTO: 51% limitation  Final FOTO: 57% limitation        Plan      See 1 more visit and plan for DC to wellness.

## 2017-03-08 ENCOUNTER — TELEPHONE (OUTPATIENT)
Dept: REHABILITATION | Facility: OTHER | Age: 77
End: 2017-03-08

## 2017-03-10 ENCOUNTER — OFFICE VISIT (OUTPATIENT)
Dept: ENDOCRINOLOGY | Facility: CLINIC | Age: 77
End: 2017-03-10
Payer: MEDICARE

## 2017-03-10 VITALS
HEART RATE: 97 BPM | SYSTOLIC BLOOD PRESSURE: 147 MMHG | BODY MASS INDEX: 36.07 KG/M2 | HEIGHT: 68 IN | WEIGHT: 238 LBS | DIASTOLIC BLOOD PRESSURE: 88 MMHG

## 2017-03-10 DIAGNOSIS — I25.119 CORONARY ARTERY DISEASE INVOLVING NATIVE CORONARY ARTERY OF NATIVE HEART WITH ANGINA PECTORIS: ICD-10-CM

## 2017-03-10 DIAGNOSIS — N18.30 TYPE 2 DIABETES MELLITUS WITH STAGE 3 CHRONIC KIDNEY DISEASE, WITH LONG-TERM CURRENT USE OF INSULIN: ICD-10-CM

## 2017-03-10 DIAGNOSIS — E78.5 HYPERLIPIDEMIA, UNSPECIFIED HYPERLIPIDEMIA TYPE: ICD-10-CM

## 2017-03-10 DIAGNOSIS — E11.22 TYPE 2 DIABETES MELLITUS WITH STAGE 3 CHRONIC KIDNEY DISEASE, WITH LONG-TERM CURRENT USE OF INSULIN: ICD-10-CM

## 2017-03-10 DIAGNOSIS — Z79.4 TYPE 2 DIABETES MELLITUS WITH STAGE 3 CHRONIC KIDNEY DISEASE, WITH LONG-TERM CURRENT USE OF INSULIN: ICD-10-CM

## 2017-03-10 DIAGNOSIS — I10 ESSENTIAL HYPERTENSION: Chronic | ICD-10-CM

## 2017-03-10 DIAGNOSIS — E11.42 TYPE 2 DIABETES MELLITUS WITH DIABETIC POLYNEUROPATHY, WITH LONG-TERM CURRENT USE OF INSULIN: ICD-10-CM

## 2017-03-10 DIAGNOSIS — Z79.4 TYPE 2 DIABETES MELLITUS WITH DIABETIC POLYNEUROPATHY, WITH LONG-TERM CURRENT USE OF INSULIN: ICD-10-CM

## 2017-03-10 DIAGNOSIS — G47.33 OSA ON CPAP: Chronic | ICD-10-CM

## 2017-03-10 DIAGNOSIS — E11.42 DIABETIC POLYNEUROPATHY ASSOCIATED WITH TYPE 2 DIABETES MELLITUS: Primary | ICD-10-CM

## 2017-03-10 PROCEDURE — 1159F MED LIST DOCD IN RCRD: CPT | Mod: S$GLB,,, | Performed by: NURSE PRACTITIONER

## 2017-03-10 PROCEDURE — 3079F DIAST BP 80-89 MM HG: CPT | Mod: S$GLB,,, | Performed by: NURSE PRACTITIONER

## 2017-03-10 PROCEDURE — 99999 PR PBB SHADOW E&M-EST. PATIENT-LVL III: CPT | Mod: PBBFAC,,, | Performed by: NURSE PRACTITIONER

## 2017-03-10 PROCEDURE — 1125F AMNT PAIN NOTED PAIN PRSNT: CPT | Mod: S$GLB,,, | Performed by: NURSE PRACTITIONER

## 2017-03-10 PROCEDURE — 1157F ADVNC CARE PLAN IN RCRD: CPT | Mod: S$GLB,,, | Performed by: NURSE PRACTITIONER

## 2017-03-10 PROCEDURE — 3077F SYST BP >= 140 MM HG: CPT | Mod: S$GLB,,, | Performed by: NURSE PRACTITIONER

## 2017-03-10 PROCEDURE — 99214 OFFICE O/P EST MOD 30 MIN: CPT | Mod: S$GLB,,, | Performed by: NURSE PRACTITIONER

## 2017-03-10 RX ORDER — INSULIN GLARGINE 100 [IU]/ML
56 INJECTION, SOLUTION SUBCUTANEOUS NIGHTLY
Qty: 2 BOX | Refills: 3
Start: 2017-03-10 | End: 2017-06-16 | Stop reason: SDUPTHER

## 2017-03-10 NOTE — PROGRESS NOTES
CC: The primary encounter diagnosis was Diabetic polyneuropathy associated with type 2 diabetes mellitus. Diagnoses of Type 2 diabetes mellitus with stage 3 chronic kidney disease, with long-term current use of insulin, Essential hypertension, Hyperlipidemia, unspecified hyperlipidemia type, Obesity, Class II, BMI 35.0-39.9, with comorbidity (see actual BMI), Coronary artery disease involving native coronary artery of native heart with angina pectoris, and MARLON on CPAP were also pertinent to this visit.      HPI: Ms. Henrietta Villasenor is a 77 y.o. Black or  female who was diagnosed with Type 2 DM in the late 1990s. Started on orals, but has been on insulin since 2000. No hospitalizations for DM.  [facility] in [facility] in January 2016.     At her last visit with me in August 2016 there were no changes to her insulin doses. Since August her A1C has trended down, but has slightly increased over the past month.     Arrives today with BG logs. She is monitoring her readings 3x/day.   AM:  with last 4 readings of 329--113  Lunch: 123-269  Dinner:   No hypoglycemia.     Admits to drinking regular iced tea between meals. No snacks between meals.     Takes Humalog 5 minutes before meals.     No missed doses of insulin.     No current exercise. Has completed 2x/week physical therapy.     No recent steroid doses.     CURRENT DIABETIC MEDS: Lantus 56 units in the evening; Humalog 23 units with each meal (uses pens)  Uses Vials or Pens: Pens  Type of Glucose Meter: Accu-Chek Rosita    Last Eye Exam: December 2016  Last Podiatry Exam: December 2016    Last DM Education Attended: Previous [facility] patient (last seen January 2016)    REVIEW OF SYSTEMS  General: no weakness or fatigue.   Eyes: (+) blurry vision at night; no eye pain; (+) bilateral cataracts (has not had surgery)   Cardiac: no chest pain or palpitations.   Respiratory: no cough; occasional dyspnea.   GI: (+) morning abdominal pain  "at times, (+) nausea (has discussed with PCP)  Skin: no rashes or insulin injection site reactions.   Neuro: no numbness or tingling; right side of mouth with chronic drooping; (+) early morning headaches at times, which have improved.   Endocrine: no polyuria, polydipsia, polyphagia.     Vital Signs  BP (!) 147/88 (BP Location: Right arm, Patient Position: Sitting)  Pulse 97  Ht 5' 8" (1.727 m)  Wt 108 kg (238 lb)  BMI 36.19 kg/m2    Hemoglobin A1C   Date Value Ref Range Status   02/22/2017 8.1 (H) 4.5 - 6.2 % Final     Comment:     According to ADA guidelines, hemoglobin A1C <7.0% represents  optimal control in non-pregnant diabetic patients.  Different  metrics may apply to specific populations.   Standards of Medical Care in Diabetes - 2016.  For the purpose of screening for the presence of diabetes:  <5.7%     Consistent with the absence of diabetes  5.7-6.4%  Consistent with increasing risk for diabetes   (prediabetes)  >or=6.5%  Consistent with diabetes  Currently no consensus exists for use of hemoglobin A1C  for diagnosis of diabetes for children.     11/21/2016 8.0 (H) 4.5 - 6.2 % Final     Comment:     According to ADA guidelines, hemoglobin A1C <7.0% represents  optimal control in non-pregnant diabetic patients.  Different  metrics may apply to specific populations.   Standards of Medical Care in Diabetes - 2016.  For the purpose of screening for the presence of diabetes:  <5.7%     Consistent with the absence of diabetes  5.7-6.4%  Consistent with increasing risk for diabetes   (prediabetes)  >or=6.5%  Consistent with diabetes  Currently no consensus exists for use of hemoglobin A1C  for diagnosis of diabetes for children.     08/22/2016 8.4 (H) 4.5 - 6.2 % Final     Comment:     According to ADA guidelines, hemoglobin A1C <7.0% represents  optimal control in non-pregnant diabetic patients.  Different  metrics may apply to specific populations.   Standards of Medical Care in Diabetes - 2016.  For " the purpose of screening for the presence of diabetes:  <5.7%     Consistent with the absence of diabetes  5.7-6.4%  Consistent with increasing risk for diabetes   (prediabetes)  >or=6.5%  Consistent with diabetes  Currently no consensus exists for use of hemoglobin A1C  for diagnosis of diabetes for children.         Chemistry        Component Value Date/Time     02/22/2017 1050    K 3.5 02/22/2017 1050     02/22/2017 1050    CO2 31 (H) 02/22/2017 1050    BUN 9 02/22/2017 1050    CREATININE 1.0 02/22/2017 1050     (H) 02/22/2017 1050        Component Value Date/Time    CALCIUM 9.3 02/22/2017 1050    ALKPHOS 87 02/22/2017 1050    AST 18 02/22/2017 1050    ALT 15 02/22/2017 1050    BILITOT 0.3 02/22/2017 1050          Lab Results   Component Value Date    CHOL 197 11/21/2016    CHOL 263 (H) 12/31/2015    CHOL 256 (H) 07/27/2015     Lab Results   Component Value Date    HDL 56 11/21/2016    HDL 46 12/31/2015    HDL 49 07/27/2015     Lab Results   Component Value Date    LDLCALC 122.2 11/21/2016    LDLCALC 189.4 (H) 12/31/2015    LDLCALC 187.8 (H) 07/27/2015     Lab Results   Component Value Date    TRIG 94 11/21/2016    TRIG 138 12/31/2015    TRIG 96 07/27/2015     Lab Results   Component Value Date    CHOLHDL 28.4 11/21/2016    CHOLHDL 17.5 (L) 12/31/2015    CHOLHDL 19.1 (L) 07/27/2015       Lab Results   Component Value Date    TSH 0.726 11/21/2016     Vit D, 25-Hydroxy   Date Value Ref Range Status   02/24/2016 38 30 - 96 ng/mL Final     Comment:     Vitamin D deficiency.........<10 ng/mL                              Vitamin D insufficiency......10-29 ng/mL       Vitamin D sufficiency........> or equal to 30 ng/mL  Vitamin D toxicity............>100 ng/mL       PHYSICAL EXAMINATION  Constitutional: Appears well, no distress  Neck: Supple, trachea midline.   Respiratory: CTA without wheezes, even and unlabored.  Cardiovascular: RRR; no carotid bruits or murmur.   Lymph: DP pulses  2+ bilaterally;  no edema.   Skin: warm and dry; no injection site reactions; (+) acanthosis nigracans observed.  Neuro:patient alert and cooperative; steady gait with use of walking cane.  Feet: see foot exam from note dated 8/31/2016; appropriate footwear.    Assessment/Plan    1. Diabetic peripheral neuropathy associated with type 2 diabetes mellitus:   A1C uncontrolled, suspected r/t snacking and drinking regular tea.   Continue current insulin doses.   Discussed avoiding between meal snacking on sweets and drinking regular tea.   Okay to use Stevia and Truvia sweeteners.   Continue Gabapentin for neuropathy.   Monitor BG 4x/day. Logs to next visit.    2. Type 2 diabetes mellitus with stage 3 chronic kidney disease:   Avoid hypoglycemia. CKD managed by Nephrology.    3. Essential hypertension: Continue  Amlodipine, Carvedilol, and Lasix.    4. Hyperlipidemia:  No current medications for lipids. Lipids improved. Reports she was taken off of Crestor due to leg cramps that were intolerable. Monitor.    5. Obesity (BMI 30-39.9): Body mass index is 36.19 kg/(m^2). Can worsen insulin resistance, but weight loss can help this. Avoid high carb snacks, sweet tea. Exercise as tolerated.     6. Coronary artery disease involving native coronary artery of native heart with angina pectoris: Managed by Cardiology.    7.  MARLON on c-pap: Sleeps with c-pap at night, but states not nightly. Encouraged nightly adherence.      FOLLOW UP  Return in about 3 months (around 6/10/2017).     Orders Placed This Encounter   Procedures    Hemoglobin A1c     Standing Status:   Future     Standing Expiration Date:   3/10/2018

## 2017-03-10 NOTE — MR AVS SNAPSHOT
[facility] - Endocrinology  [address]  [facility] [address]  Phone: [phone]                  Henrietta Villasenor   3/10/2017 10:00 AM   Office Visit    Description:  Female : 1940   Provider:  GEORGIANA Malagon,ANP-C   Department:  [facility] - Endocrinology           Reason for Visit     Diabetes Mellitus           Diagnoses this Visit        Comments    Diabetic polyneuropathy associated with type 2 diabetes mellitus    -  Primary     Type 2 diabetes mellitus with stage 3 chronic kidney disease, with long-term current use of insulin         Essential hypertension         Hyperlipidemia, unspecified hyperlipidemia type         Obesity, Class II, BMI 35.0-39.9, with comorbidity (see actual BMI)         Coronary artery disease involving native coronary artery of native heart with angina pectoris         MARLON on CPAP         Type 2 diabetes mellitus with diabetic polyneuropathy, with long-term current use of insulin                To Do List           Future Appointments        Provider Department Dept Phone    3/14/2017 10:30 AM Christin Sosa [facility] [phone]    3/22/2017 10:00 AM Maycol Mackay [facility] [phone]    3/22/2017 1:20 PM Nina Lee MD [facility] - Nephrology [phone]    4/3/2017 9:00 AM Laura Sheridan DPM [facility] - Podiatry [phone]    2017 10:30 AM Jeannine Arriaga MD [facility] - Internal Medicine [phone]      Goals (5 Years of Data)     None      Follow-Up and Disposition     Return in about 3 months (around 6/10/2017).    Follow-up and Disposition History       These Medications        Disp Refills Start End    insulin glargine (LANTUS SOLOSTAR) 100 unit/mL (3 mL) InPn pen 2 Box 3 3/10/2017     Inject 56 Units into the skin every evening. - Subcutaneous    Pharmacy: [facility] - [facility] [address] [facility] Ph #: [phone]         [facility]      [facility] Care [facility] - 24/7 Assistance  Registered nurses in the [facility] provide clinical advisement, health education, appointment booking, and other advisory services.  Call for this free service at [phone].             Medications           Message regarding Medications     Verify the changes and/or additions to your medication regime listed below are the same as discussed with your clinician today.  If any of these changes or additions are incorrect, please notify your healthcare provider.        CHANGE how you are taking these medications     Start Taking Instead of    insulin glargine (LANTUS SOLOSTAR) 100 unit/mL (3 mL) InPn pen insulin glargine (LANTUS SOLOSTAR) 100 unit/mL (3 mL) InPn pen    Dosage:  Inject 56 Units into the skin every evening. Dosage:  Inject 54 Units into the skin every evening.    Reason for Change:  Reorder       STOP taking these medications     methylPREDNISolone (MEDROL DOSEPACK) 4 mg tablet use as directed           Verify that the below list of medications is an accurate representation of the medications you are currently taking.  If none reported, the list may be blank. If incorrect, please contact your healthcare provider. Carry this list with you in case of emergency.           Current Medications     ACCU-CHEK FASTCLIX Misc     ACCU-CHEK SMARTVIEW TEST STRIP Strp TEST THREE TIMES DAILY    acetaminophen (TYLENOL) 500 mg Cap     albuterol (PROAIR HFA) 90 mcg/actuation inhaler Inhale 2 puffs into the lungs every 4 (four) hours as needed. 2 HFA Aerosol Inhaler Inhalation Every 4-6 hours. Ventolin or PRoair for asthma    albuterol 90 mcg/actuation inhaler Inhale 2 puffs into the lungs every 6 (six) hours as needed for Wheezing.    ALCOHOL ANTISEPTIC PADS (ALCOHOL PREP PADS TOP)     amlodipine (NORVASC) 10 MG tablet TAKE 1 TABLET EVERY MORNING.    amoxicillin-clavulanate 875-125mg (AUGMENTIN) 875-125 mg per tablet Take 1 tablet by mouth every 12  "(twelve) hours.    aspirin (ECOTRIN) 81 MG EC tablet Take 1 tablet by mouth Daily.    atorvastatin (LIPITOR) 40 MG tablet     baclofen (LIORESAL) 10 MG tablet 1/2 - 1 tablet at bedtime as needed for muscle spasm    BD ALCOHOL SWAB PadM     BD ULTRA-FINE OLU PEN NEEDLES 32 x 5/32 " Ndle Uses 4 times daily, on multiple daily insulin injections    benzonatate (TESSALON) 200 MG capsule Take 1 capsule (200 mg total) by mouth every evening.    carvedilol (COREG) 25 MG tablet TAKE 1 TABLET TWICE DAILY    cholecalciferol, vitamin D3, (VITAMIN D) 2,000 unit Cap Take by mouth. 1 Capsule Oral Every morning    dicyclomine (BENTYL) 10 MG capsule Take 1 capsule (10 mg total) by mouth 3 (three) times daily as needed (abdominal pain).    duloxetine (CYMBALTA) 60 MG capsule Take 1 capsule (60 mg total) by mouth once daily.    esomeprazole (NEXIUM) 40 MG capsule     ferrous sulfate 325 mg (65 mg iron) Tab tablet Take 1 tablet (325 mg total) by mouth once daily.    FIRST AID CLOTH TAPE 1 X 10 "-yard Tape     FIRST AID TAPE 1 X 2.3 "-yard Bndg     fluticasone (FLONASE) 50 mcg/actuation nasal spray SHAKE LQ AND U 2 SPRAYS IEN QD    furosemide (LASIX) 40 MG tablet TAKE 1 TABLET twice daily    gabapentin (NEURONTIN) 100 MG capsule TAKE 1 CAPSULE THREE TIMES DAILY  AND TAKE 3 CAPSULES AT BEDTIME    insulin glargine (LANTUS SOLOSTAR) 100 unit/mL (3 mL) InPn pen Inject 56 Units into the skin every evening.    insulin lispro (HUMALOG KWIKPEN) 100 unit/mL InPn pen Inject 23 Units into the skin 3 (three) times daily with meals. Plus correction scale, for max TDD 84 units daily    Lactobacillus acidophilus (PROBIOTIC) 10 billion cell Cap Take 1 capsule by mouth once daily.    loratadine (CLARITIN) 10 mg tablet Take 1 tablet (10 mg total) by mouth once daily.    MUSCLE RUB, WITH CAMPHOR, 4-30-10 % Crea     nitroGLYCERIN (NITROSTAT) 0.4 MG SL tablet Place 1 tablet (0.4 mg total) under the tongue every 5 (five) minutes as needed.    " "oxycodone-acetaminophen (PERCOCET)  mg per tablet Take 1 tablet by mouth 5 (five) times daily. Dose increased from 4 times daily to 5 times daily. Please fill early    oxycodone-acetaminophen (PERCOCET)  mg per tablet Starting on Mar 22, 2017. Take 1 tablet by mouth 5 (five) times daily.    oxycodone-acetaminophen (PERCOCET)  mg per tablet Starting on Apr 22, 2017. Take 1 tablet by mouth 5 (five) times daily.    pantoprazole (PROTONIX) 40 MG tablet TAKE 1 TABLET(40 MG) BY MOUTH EVERY DAY    pen needle, diabetic (SURE-FINE PEN NEEDLES) 29 gauge x 1/2" Ndle Use 4 times daily    promethazine (PHENERGAN) 6.25 mg/5 mL syrup Take 5 mLs (6.25 mg total) by mouth every 4 (four) hours as needed (itching & congestion).    valacyclovir (VALTREX) 500 MG tablet TAKE 1 TABLET ONE TIME DAILY    triamcinolone acetonide 0.1% (KENALOG) 0.1 % cream Apply topically 3 (three) times daily.           Clinical Reference Information           Your Vitals Were     BP Pulse Height Weight BMI    147/88 (BP Location: Right arm, Patient Position: Sitting) 97 5' 8" (1.727 m) 108 kg (238 lb) 36.19 kg/m2      Blood Pressure          Most Recent Value    BP  (!)  147/88      Allergies as of 3/10/2017     Crestor [Rosuvastatin]    Ezetimibe    Hydrocodone    Lisinopril    Sulfa (Sulfonamide Antibiotics)    Sulfamethoxazole    Sulfamethoxazole-trimethoprim    Trimethoprim    Valsartan      Immunizations Administered on Date of Encounter - 3/10/2017     None      Orders Placed During Today's Visit      Normal Orders This Visit    Ambulatory referral to Nephrology     Future Labs/Procedures Expected by Expires    Hemoglobin A1c  3/10/2017 3/10/2018      [facility] Sign-Up     Activating your [facility] account is as easy as 1-2-3!     1) Visit [email or web address], select Sign Up Now, enter this activation code and your date of birth, then select Next.  Activation code not generated  Current Patient Portal Status: Account disabled      2) " Create a username and password to use when you visit [facility] in the future and select a security question in case you lose your password and select Next.    3) Enter your e-mail address and click Sign Up!    Additional Information  If you have questions, please e-mail [email or web address] or call [phone] to talk to our [email or web address] staff. Remember, [facility] is NOT to be used for urgent needs. For medical emergencies, dial 911.         Language Assistance Services     ATTENTION: Language assistance services are available, free of charge. Please call [phone].      ATENCIÓN: Si habla español, tiene a collado disposición servicios gratuitos de asistencia lingüística. Llame al [phone].     CHÚ Ý: N?u b?n nói Ti?ng Vi?t, có các d?ch v? h? tr? ngôn ng? mi?n phí dành cho b?n. G?i s? [phone].         [facility] complies with applicable Federal civil rights laws and does not discriminate on the basis of race, color, national origin, age, disability, or sex.

## 2017-03-14 ENCOUNTER — DOCUMENTATION ONLY (OUTPATIENT)
Dept: REHABILITATION | Facility: OTHER | Age: 77
End: 2017-03-14
Attending: PHYSICAL MEDICINE & REHABILITATION
Payer: MEDICARE

## 2017-03-14 ENCOUNTER — TELEPHONE (OUTPATIENT)
Dept: INTERNAL MEDICINE | Facility: CLINIC | Age: 77
End: 2017-03-14

## 2017-03-14 RX ORDER — CODEINE PHOSPHATE AND GUAIFENESIN 10; 100 MG/5ML; MG/5ML
10 SOLUTION ORAL EVERY 6 HOURS PRN
Qty: 240 ML | Refills: 1 | Status: SHIPPED | OUTPATIENT
Start: 2017-03-14 | End: 2017-05-26 | Stop reason: SDUPTHER

## 2017-03-14 NOTE — PROGRESS NOTES
Health  Wellness Visit Note    Name: Henrietta Villasenor  Clinic Number: [id number]  Physician: Lizy Le, *  Diagnosis: No diagnosis found.  Past Medical History:   Diagnosis Date    Abnormality of lung 11/08/2011    Stable bandlike opacities at the lung bases, most likely representing      Anxiety     Arthritis     CAD (coronary artery disease)     Cataract     CHF (congestive heart failure)     Chronic back pain 7/29/2012    Colon polyp 9/2010; 11/2010    Colon polyps 7/29/2012    Coronary artery disease involving native coronary artery of native heart with angina pectoris     Depression     Diabetes mellitus     Diabetes mellitus type II     Diverticulitis large intestine 7/27/2015    Diverticulosis 09/25/2010; 11/02/2010; 11/08/2011; 7/29/2012    Duodenal disorder 08/25/2011    Duodenal erosion noted on EGD.    Duodenal ulcer, unspecified as acute or chronic, without hemorrhage, perforation, or obstruction 8/24/2011    E. coli sepsis 12/2010    Due to left ureteral stone with left nephrostomy tube - hospitalized in [place]    Esophageal dysmotility 01/24/2012    Noted on upper GI-barium swallow.    Facial weakness 1969    Left facial weakness s/p left mastoidectomy in ~ 1969.    Fatty liver 11/08/2011    Reported on CT-abdomen and in 06/2012 Gastro clinic visit note.    Gastric polyp 09/29/2010    GERD (gastroesophageal reflux disease) 7/29/2012    Hepatomegaly 11/08/2011    Reported on CT-abdomen    Hiatal hernia 06/26/2006; 09/29/2010; 08/25/2011    Noted on barium swallow 2006; noted on  EGD 2011.   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    Trouble in sleeping     Type II or unspecified type diabetes mellitus with neurological manifestations, not stated as uncontrolled     Type II or unspecified type diabetes mellitus with peripheral circulatory disorders, not stated as uncontrolled(250.70)     Type II or unspecified type diabetes mellitus with renal manifestations, not stated as uncontrolled      Visit Number: 21  Precautions: (L) TKA, [place] (L) ear, COPD with SOB with increased exertion  Time In: 10:04 minutes  Time Out: 10:53 PM  Total Treatment Time: 49 minutes    Subjective:   Patient reports that her lower back has been feeling good overall; states that she will experience some "achyness" and discomfort in her lower back if she walks for an extended period of time; mentioned that she had some achyness in her back this morning as she was walking from the parking garage; says that the only time she has any type of back discomfort is when her activity level increases; encouraged patient to ice after she completes any activity, mainly household chores.    Objective:   Henrietta completed therapeutic stretches (EIL, MOUSTAPHA) and the following MedX exercise machines: core lumbar, torso rotation l/r, leg extension, leg curl, upright row, chest press, biceps curl, triceps extension, leg press    See exercise log in patient folder for rate of exertion and repetitions completed.     Assessment:   Patient tolerated all exercises on the MedX equipment without any increase in symptoms; iced lower back after completion of exercises.    Plan:  Continue with established plan of care towards wellness " goals. Patient has one more complementary visit and will then transition to [facility] via [facility] Referral.    Health  : Christin Sosa  3/14/2017

## 2017-03-14 NOTE — TELEPHONE ENCOUNTER
----- Message from Jose D Haley sent at 3/14/2017  8:13 AM CDT -----  Contact: Self [phone]  The above pt is requesting a Rx for cough, stated that she was up all night coughing, @ [facility] [address], advice    Thanks

## 2017-03-14 NOTE — TELEPHONE ENCOUNTER
X 2 days, persistent cough, mainly at night. Pt states that she has coughed so much that she is having stomach pains when she coughs. Pt would like a cough syrup called to the pharmacy to help her sleep. Pt states that she is coughing up very little medicine.

## 2017-03-20 ENCOUNTER — OFFICE VISIT (OUTPATIENT)
Dept: INTERNAL MEDICINE | Facility: CLINIC | Age: 77
End: 2017-03-20
Payer: MEDICARE

## 2017-03-20 VITALS
HEART RATE: 73 BPM | SYSTOLIC BLOOD PRESSURE: 130 MMHG | WEIGHT: 243.38 LBS | HEIGHT: 68 IN | TEMPERATURE: 99 F | DIASTOLIC BLOOD PRESSURE: 60 MMHG | BODY MASS INDEX: 36.89 KG/M2 | OXYGEN SATURATION: 96 %

## 2017-03-20 DIAGNOSIS — Z79.4 TYPE 2 DIABETES MELLITUS WITH STAGE 3 CHRONIC KIDNEY DISEASE, WITH LONG-TERM CURRENT USE OF INSULIN: ICD-10-CM

## 2017-03-20 DIAGNOSIS — N18.30 TYPE 2 DIABETES MELLITUS WITH STAGE 3 CHRONIC KIDNEY DISEASE, WITH LONG-TERM CURRENT USE OF INSULIN: ICD-10-CM

## 2017-03-20 DIAGNOSIS — J45.901 ASTHMA EXACERBATION: Primary | ICD-10-CM

## 2017-03-20 DIAGNOSIS — E11.22 TYPE 2 DIABETES MELLITUS WITH STAGE 3 CHRONIC KIDNEY DISEASE, WITH LONG-TERM CURRENT USE OF INSULIN: ICD-10-CM

## 2017-03-20 DIAGNOSIS — I10 ESSENTIAL HYPERTENSION: Chronic | ICD-10-CM

## 2017-03-20 PROCEDURE — 99214 OFFICE O/P EST MOD 30 MIN: CPT | Mod: S$GLB,,, | Performed by: INTERNAL MEDICINE

## 2017-03-20 PROCEDURE — 1125F AMNT PAIN NOTED PAIN PRSNT: CPT | Mod: S$GLB,,, | Performed by: INTERNAL MEDICINE

## 2017-03-20 PROCEDURE — 99999 PR PBB SHADOW E&M-EST. PATIENT-LVL III: CPT | Mod: PBBFAC,,, | Performed by: INTERNAL MEDICINE

## 2017-03-20 PROCEDURE — 1160F RVW MEDS BY RX/DR IN RCRD: CPT | Mod: S$GLB,,, | Performed by: INTERNAL MEDICINE

## 2017-03-20 PROCEDURE — 3078F DIAST BP <80 MM HG: CPT | Mod: S$GLB,,, | Performed by: INTERNAL MEDICINE

## 2017-03-20 PROCEDURE — 1157F ADVNC CARE PLAN IN RCRD: CPT | Mod: S$GLB,,, | Performed by: INTERNAL MEDICINE

## 2017-03-20 PROCEDURE — 1159F MED LIST DOCD IN RCRD: CPT | Mod: S$GLB,,, | Performed by: INTERNAL MEDICINE

## 2017-03-20 PROCEDURE — 3075F SYST BP GE 130 - 139MM HG: CPT | Mod: S$GLB,,, | Performed by: INTERNAL MEDICINE

## 2017-03-20 PROCEDURE — 99499 UNLISTED E&M SERVICE: CPT | Mod: S$GLB,,, | Performed by: INTERNAL MEDICINE

## 2017-03-20 RX ORDER — PREDNISONE 10 MG/1
TABLET ORAL
Qty: 10 TABLET | Refills: 0 | Status: SHIPPED | OUTPATIENT
Start: 2017-03-20 | End: 2017-05-17

## 2017-03-20 NOTE — MR AVS SNAPSHOT
[facility] - Internal Medicine  [address]  [facility] [address]  Phone: [phone]  Fax: [phone]                  Henrietta Villasenor   3/20/2017 12:00 PM   Office Visit    Description:  Female : 1940   Provider:  Jeannine Arriaga MD   Department:  [facility] - Internal Medicine           Reason for Visit     Cough     Generalized Body Aches           Diagnoses this Visit        Comments    Asthma exacerbation    -  Primary            To Do List           Future Appointments        Provider Department Dept Phone    3/22/2017 10:00 AM Maycol Mackay [facility] [phone]    3/22/2017 1:20 PM Nina Lee MD [facility] Nephrology [phone]    4/3/2017 9:00 AM Laura Sheridan DPM [facility] Podiatry [phone]    2017 10:30 AM Jeannine Arriaga MD [facility] Internal Medicine [phone]    2017 11:30 AM Lizy Le MD [facility] - Spine Services [phone]      Goals (5 Years of Data)     None       These Medications        Disp Refills Start End    predniSONE (DELTASONE) 10 MG tablet 10 tablet 0 3/20/2017     2 tablets daily for 3 days then one tablet daily for 4 days.    Pharmacy: [facility] [address] [place] [address] [address] AT [facility] Ph #: [phone]         [facility]     [facility] Care Line - /7 Assistance  Registered nurses in the [facility] provide clinical advisement, health education, appointment booking, and other advisory services.  Call for this free service at [phone].             Medications           Message regarding Medications     Verify the changes and/or additions to your medication regime listed below are the same as discussed with your clinician today.  If any of these changes or additions are incorrect, please notify your healthcare provider.        START taking these NEW medications        Refills    predniSONE  "(DELTASONE) 10 MG tablet 0    Si tablets daily for 3 days then one tablet daily for 4 days.    Class: Normal      STOP taking these medications     amoxicillin-clavulanate 875-125mg (AUGMENTIN) 875-125 mg per tablet Take 1 tablet by mouth every 12 (twelve) hours.           Verify that the below list of medications is an accurate representation of the medications you are currently taking.  If none reported, the list may be blank. If incorrect, please contact your healthcare provider. Carry this list with you in case of emergency.           Current Medications     ACCU-CHEK FASTCLIX Misc     ACCU-CHEK SMARTVIEW TEST STRIP Strp TEST THREE TIMES DAILY    acetaminophen (TYLENOL) 500 mg Cap     albuterol (PROAIR HFA) 90 mcg/actuation inhaler Inhale 2 puffs into the lungs every 4 (four) hours as needed. 2 HFA Aerosol Inhaler Inhalation Every 4-6 hours. Ventolin or PRoair for asthma    albuterol 90 mcg/actuation inhaler Inhale 2 puffs into the lungs every 6 (six) hours as needed for Wheezing.    ALCOHOL ANTISEPTIC PADS (ALCOHOL PREP PADS TOP)     amlodipine (NORVASC) 10 MG tablet TAKE 1 TABLET EVERY MORNING.    aspirin (ECOTRIN) 81 MG EC tablet Take 1 tablet by mouth Daily.    atorvastatin (LIPITOR) 40 MG tablet     baclofen (LIORESAL) 10 MG tablet 1/2 - 1 tablet at bedtime as needed for muscle spasm    BD ALCOHOL SWAB PadM     BD ULTRA-FINE OLU PEN NEEDLES 32 x 5/32 " Ndle Uses 4 times daily, on multiple daily insulin injections    benzonatate (TESSALON) 200 MG capsule Take 1 capsule (200 mg total) by mouth every evening.    carvedilol (COREG) 25 MG tablet TAKE 1 TABLET TWICE DAILY    cholecalciferol, vitamin D3, (VITAMIN D) 2,000 unit Cap Take by mouth. 1 Capsule Oral Every morning    dicyclomine (BENTYL) 10 MG capsule Take 1 capsule (10 mg total) by mouth 3 (three) times daily as needed (abdominal pain).    duloxetine (CYMBALTA) 60 MG capsule Take 1 capsule (60 mg total) by mouth once daily.    esomeprazole (NEXIUM) " "40 MG capsule     ferrous sulfate 325 mg (65 mg iron) Tab tablet Take 1 tablet (325 mg total) by mouth once daily.    FIRST AID CLOTH TAPE 1 X 10 "-yard Tape     FIRST AID TAPE 1 X 2.3 "-yard Bndg     fluticasone (FLONASE) 50 mcg/actuation nasal spray SHAKE LQ AND U 2 SPRAYS IEN QD    furosemide (LASIX) 40 MG tablet TAKE 1 TABLET twice daily    gabapentin (NEURONTIN) 100 MG capsule TAKE 1 CAPSULE THREE TIMES DAILY  AND TAKE 3 CAPSULES AT BEDTIME    guaifenesin-codeine 100-10 mg/5 ml (TUSSI-ORGANIDIN NR)  mg/5 mL syrup Take 10 mLs by mouth every 6 (six) hours as needed for Cough. Sedating, no driving after taking    insulin glargine (LANTUS SOLOSTAR) 100 unit/mL (3 mL) InPn pen Inject 56 Units into the skin every evening.    insulin lispro (HUMALOG KWIKPEN) 100 unit/mL InPn pen Inject 23 Units into the skin 3 (three) times daily with meals. Plus correction scale, for max TDD 84 units daily    Lactobacillus acidophilus (PROBIOTIC) 10 billion cell Cap Take 1 capsule by mouth once daily.    loratadine (CLARITIN) 10 mg tablet Take 1 tablet (10 mg total) by mouth once daily.    MUSCLE RUB, WITH CAMPHOR, 4-30-10 % Crea     nitroGLYCERIN (NITROSTAT) 0.4 MG SL tablet Place 1 tablet (0.4 mg total) under the tongue every 5 (five) minutes as needed.    oxycodone-acetaminophen (PERCOCET)  mg per tablet Take 1 tablet by mouth 5 (five) times daily. Dose increased from 4 times daily to 5 times daily. Please fill early    oxycodone-acetaminophen (PERCOCET)  mg per tablet Starting on Mar 22, 2017. Take 1 tablet by mouth 5 (five) times daily.    oxycodone-acetaminophen (PERCOCET)  mg per tablet Starting on Apr 22, 2017. Take 1 tablet by mouth 5 (five) times daily.    pantoprazole (PROTONIX) 40 MG tablet TAKE 1 TABLET(40 MG) BY MOUTH EVERY DAY    pen needle, diabetic (SURE-FINE PEN NEEDLES) 29 gauge x 1/2" Ndle Use 4 times daily    promethazine (PHENERGAN) 6.25 mg/5 mL syrup Take 5 mLs (6.25 mg total) by mouth " "every 4 (four) hours as needed (itching & congestion).    valacyclovir (VALTREX) 500 MG tablet TAKE 1 TABLET ONE TIME DAILY    predniSONE (DELTASONE) 10 MG tablet 2 tablets daily for 3 days then one tablet daily for 4 days.    triamcinolone acetonide 0.1% (KENALOG) 0.1 % cream Apply topically 3 (three) times daily.           Clinical Reference Information           Your Vitals Were     BP Pulse Temp Height Weight SpO2    130/60 (BP Location: Left arm, Patient Position: Sitting, BP Method: Manual) 73 98.9 °F (37.2 °C) (Oral) 5' 8" (1.727 m) 110.4 kg (243 lb 6.2 oz) 96%    BMI                37.01 kg/m2          Blood Pressure          Most Recent Value    BP  130/60      Allergies as of 3/20/2017     Crestor [Rosuvastatin]    Ezetimibe    Hydrocodone    Lisinopril    Sulfa (Sulfonamide Antibiotics)    Sulfamethoxazole    Sulfamethoxazole-trimethoprim    Trimethoprim    Valsartan      Immunizations Administered on Date of Encounter - 3/20/2017     None      [facility] Sign-Up     Activating your [facility] account is as easy as 1-2-3!     1) Visit [email or web address], select Sign Up Now, enter this activation code and your date of birth, then select Next.  Activation code not generated  Current Patient Portal Status: Account disabled      2) Create a username and password to use when you visit [facility] in the future and select a security question in case you lose your password and select Next.    3) Enter your e-mail address and click Sign Up!    Additional Information  If you have questions, please e-mail [email or web address] or call [phone] to talk to our [facility] staff. Remember, [facility] is NOT to be used for urgent needs. For medical emergencies, dial 911.         Language Assistance Services     ATTENTION: Language assistance services are available, free of charge. Please call [phone].      ATENCIÓN: Si habla español, tiene a collado disposición servicios gratuitos de asistencia lingüística. Llame al " [phone].     IMMANUEL Ý: N?u b?n nói Ti?ng Vi?t, có các d?ch v? h? tr? ngôn ng? mi?n phí dành cho b?n. G?i s? [phone].         [facility] complies with applicable Federal civil rights laws and does not discriminate on the basis of race, color, national origin, age, disability, or sex.

## 2017-03-20 NOTE — PROGRESS NOTES
CHIEF COMPLAINT: Cough      HISTORY OF PRESENT ILLNESS: This is a 77-year-old woman who presents due to cough since 3/16/17.  Cough is mostly dry. She occasionally brings up white mucous.  She feels tired.  No fever or chills.  She feels hot at times.  She is nauseated at times the last 4 days.  She has body aches.  Her balance is off. She is dizzy at times.  No sinus congestion, sore throat, ear pain. Neck is doing well now.  NO diarrhea. She is wheezing. She is short of breath at times. She smoked 1-1.5 packs per day for about 25 years. She quit 15 years ago. She has been working in the yard lately and the oak trees are blooming. She is taking loratadine 10 mg daily. She has been using her albuterol which is not helping    She continues to have occasional abdominal pain and cramping. She feels bloating at times which is not new       She saw Dr Le 1/31/17 and she has been doing therapy [facility]. Her back is doing a little better. She is now walking without a cane. She has occasional left hip pain. She is taking gabapentin 100 mg 3 capsules three times and 3 at bedtime. Dose has been increased due to foot pain. Foot pain is better on higher dose of gabapentin.  She takes oxycodone apap 5/325 one tablet up to 5 times daily which keeps her back pain under control. She has to take oxycodone apap when she is active and does things. If she does not go out, she does not have have to take as much oxycodone apap.        She continues to take amlodipine 10 mg daily, aspirin 81 mg daily, coreg 25 mg twice daily and lasix 40 mg twice daily, and KCL 10 meq once daily for her diastolic dysfunction and hypertension. She denies lightheadedness. No chest pain or shortness of breath. She is now taking atorvastatin 40 mg once daily for her cholesterol.      She is taking lantus 56 units at night and Novolog 23 units with meals. Blood sugars have been fine. No hypoglycemia.        PAST MEDICAL HISTORY:   1.  "Diabetes mellitus   2. Hypertension   3. Hyperlipidemia   4. Left heart catheterization January 2007 which revealed luminal irregularities in the LAD, left circumflex and right coronary artery. She had diastolic dysfunction and patent renal arteries.   5. Sleep apnea   6. Obesity.   7. Nephrolithiasis status post lithotripsy.   8. Reflux - had nonerosive gastropathy on EGD September 2007. Gastritis on EGD 9/2010  9. Hidradenitis suppurativa.   10. History of diverticulitis with a hospitalized October 2007.   11. Migraines   12. Obesity.   13. 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    14. Total hysterectomy in 1969.   15. Cholecystectomy was done at that time as well.   16. 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   17. Colon polyps on colonscopy 11/2010 - due 2013   18. Hospitalization 12/10 for e coli sepsis due to left ureteral stone with left nephrostomy tube in [place], [place]   19. Left knee replacement 9/2012      MEDICATIONS and ALLERGIES: Updated on EPIC.     PHYSICAL EXAMINATION:    /60 (BP Location: Left arm, Patient Position: Sitting, BP Method: Manual)  Pulse 73  Temp 98.9 °F (37.2 °C) (Oral)   Ht 5' 8" (1.727 m)  Wt 110.4 kg (243 lb 6.2 oz)  SpO2 96%  BMI 37.01 kg/m2    GENERAL: She is alert, oriented, no apparent distress. Affect within   normal limits.  Conjunctiva anicteric. . Oropharynx clear. Swelling of the left lower lip  NECK: Supple.   Respiratory: Effort normal. Lungs clear  HEART: Regular rate and rhythm without murmurs, gallops or rubs.   No lower extremity edema.     ABDOMEN: soft, non distended, non tender, bowel sounds present, no hepatosplenomgaly        ASSESSMENT AND PLAN:   1. Allergic rhinitis - flared with some asthma - prednisone taper. - continue loratadine and flonase  2. Diabetes- watch sugars on prednisone. Increase lantus to 60 units daily  3. Hypertension - stable.    4. Hyperlipidemia " - on lipitor  5. GERD - asx  6. Allergic rhinitis -try flonase  7. Diastolic congestive heart failure - stable.    8. CRI -labs  9. Obesity - discussed diet, exercise and weight loss  10. CAD -risk factor modification  11. Aortic atherosclerosis - risk factor modification  12. Tortuous aorta - HTN controlled  13. Colon polyps - Colonoscopy in 8/2013 - 2 polyps. Flex sig 11/2013 - mild granular mucosa. Recommended colonoscopy in 8/2018. Attempted colonoscopy 11/2016 du to diarrhea which has resolved. She had poor prep with hyperplastic rectal biopsy.   MMG 10/16  Prevnar 10/15 and flu shot 11/16  I will see her back as scheduled. sooner if problems

## 2017-03-21 RX ORDER — GABAPENTIN 100 MG/1
CAPSULE ORAL
Qty: 540 CAPSULE | Refills: 2 | Status: SHIPPED | OUTPATIENT
Start: 2017-03-21 | End: 2018-02-06 | Stop reason: SDUPTHER

## 2017-03-21 NOTE — TELEPHONE ENCOUNTER
----- Message from Evangelina Baires sent at 3/21/2017  9:09 AM CDT -----  Contact: Self/[phone] home  RX request - refill or new RX.  Is this a refill or new RX:  Refill  RX name and strength: gabapentin (NEURONTIN) 100 MG capsule  Directions: TAKE 1 CAPSULE THREE TIMES DAILY  AND TAKE 3 CAPSULES AT BEDTIME  Is this a 30 day or 90 day RX:    Pharmacy name and phone #: [facility] [phone]  Comments:  Pt stated that she is out of her medication. Please call and advise        Thank you

## 2017-03-22 NOTE — MR AVS SNAPSHOT
[facility]  [address] [facility] [address]  Phone: [phone]  Fax: [phone]                  Henrietta Villasenor   2017 10:40 AM   Office Visit    Description:  Female : 1940   Provider:  Vignesh Vallejo MD   Department:  [facility]           Reason for Visit     Nasal Congestion     Sore Throat           Diagnoses this Visit        Comments    Sinusitis, unspecified chronicity, unspecified location    -  Primary     Nasal itching         Sinus congestion                To Do List           Future Appointments        Provider Department Dept Phone    2017 10:30 AM Astrid Friedman, PTA [facility] [phone] 10:30 AM Yesenia Elder, PT [facility] [phone] 11:30 AM Lizy Le MD [facility] [phone] 10:00 AM Jeannine Arriaga MD [facility] [phone]    3/3/2017 10:00 AM LAB, APPOINTMENT [place] [facility] [phone]      Goals (5 Years of Data)     None       These Medications        Disp Refills Start End    promethazine (PHENERGAN) 6.25 mg/5 mL syrup 118 mL 0 2017     Take 5 mLs (6.25 mg total) by mouth every 4 (four) hours as needed (itching & congestion). - Oral    [facility] [address] [place], [place] [address] [address] AT [facility] Kyle Gibbons Ph #: [phone]       doxycycline (MONODOX) 100 MG capsule 20 capsule 0 2017    Take 1 capsule (100 mg total) by mouth 2 (two) times daily. - Oral    Pharmacy: [facility] [id number] [place] [address] [address] AT [facility] [address]bons Ph #: [phone]         [facility] -  Assistance  Registered nurses in the [facility] provide clinical advisement, health education, appointment booking,  Physical Therapy Daily Treatment      Visit Count: 2  THERAPY BENEFITS:  PAYOR: [facility]  VISIT LIMIT: 50 VISITS PER CALENDAR YEAR FOR PT  AUTHORIZATION NEEDED: NO  COINSURANCE: 10%  COPAY: $0    Next Referring Provider Visit: 3/22/17    Referred by: Dr. Bob Bonilla MD  Medical Diagnosis (from order): Sacroiliitis, not elsewhere classified [M46.1]   Insurance: 1. [employer]  2. N/A    Date of Onset: new onset; 7/2016   Diagnosis Precautions: none  Relevant co-morbidities and medications:no comorbidities; advil   Relevant Tests: Lumbar xray and bone scan both negative       SUBJECTIVE   Pain has been good , legs feel tight today from a lot of running yesterday; doing the stretching  Current Pain: none at the moment/10.    Functional Change: doing pretty good, occasional tweaks    OBJECTIVE   Supine R LE long to short and after MT/ TE supine testing even.     Treatment      MT: STM to R rectus w/ bolster under LEs for near 90/90 positioning , then to rectus w/ hip at 0 degrees and knee flexed over edge of the bed ;mobs for R AI in SL before and after TE      TE: as per log:   Therapeutic Exercises Details Comments HEP(x) Performed this date(x)   hamstring stretching  Long sitting Cues for movement at hips vs lumbar spine x    Psoas stretching Stand and 1/2 knee  verbal review doing 1/2 kneeling mostly x x   muscle energy  For R AI (x2 before and after MT)   x x   Movement mechanics Initiated for reaching low   x    Proper sitting posture Cues to proper lordosis manual and verbal   x     prone hip ext  for R and L x 5 secs holds  on 2 pillows, pt noting L easier than the R  x x     rectus stretching  PT manually in L SL for RLE      x                                                         ASSESSMENT   Today initially testing w/ R AI but w/ correction then testing equal in supine. Has been doing well . Less psoas tightness per Cong testing today. Pt doing well w/ home stretching    Pain after treatment:  /10  Result of above outlined education: Verbalizes understanding and Demonstrates understanding    Goals:       To be obtained by end of this plan of care:  Goals: to be obtained in 8 visits  · Subjective improvement by 80% or better with regards to decrease in pain and increased function.  · Lumbar ROM limitation of 25% or less in all planes without increased provocation of c/o  · muscle flexibility: psoas (Cong test) w/ tightness 8 degrees or better; R hamstrings 25 degrees or better.   · Pt to demonstrate sitting posture in neutral lordosis w/o verbal or manual cues when in PT  · Patient will be independent in progressive home exercise program to include neutral sitting posture and proper movement mechanics/ lifting mechanics as related to usual ADLS and any work tasks to assist in achieving above goals.  · Above goals to allow for: sit to stand w/o provocation; lifting w/o provocation; warm up running for track w/o onset of tension; standing w/ report of increased stability and comfort w/o need to shift repetitively; good norma to all track event/ practices for overall health and increased quality of life.    · Oswestry Disability score of 2 % for overall increased quality of life     Functional Limitations (patient reported):   · Certain movements:twisting / rotation w/ tension and harder to the Right  · Some exercise:warming up w/ track can feel tension, then stretching aggravates  · Shoveling the other day w/ aggravated status(35 ins of shoveling); w/ resolution w/ use of therawrap and better end of the night.   · Standing: after a long while; has to shift from LLE to RLE  · Sit to stand w/ tightness after long sitting time  · Lifting: wt lifting dead lifts / squats hurts a little bit, has to limit amount of weight.   · Oswestry Disability score: 6 %        PLAN   Continue. Recheck for R AI; continue w/ psoas stretching/ rectus stretching. Has MD f/u today.      THERAPY DAILY BILLING   Primary Insurance:  and other advisory services.  Call for this free service at [phone].             Medications           Message regarding Medications     Verify the changes and/or additions to your medication regime listed below are the same as discussed with your clinician today.  If any of these changes or additions are incorrect, please notify your healthcare provider.        START taking these NEW medications        Refills    promethazine (PHENERGAN) 6.25 mg/5 mL syrup 0    Sig: Take 5 mLs (6.25 mg total) by mouth every 4 (four) hours as needed (itching & congestion).    Class: Normal    Route: Oral    doxycycline (MONODOX) 100 MG capsule 0    Sig: Take 1 capsule (100 mg total) by mouth 2 (two) times daily.    Class: Normal    Route: Oral      STOP taking these medications     cetirizine (ZYRTEC) 10 MG tablet Take 1 tablet (10 mg total) by mouth daily as needed for Allergies.    meclizine (ANTIVERT) 12.5 mg tablet Take 1 tablet (12.5 mg total) by mouth 3 (three) times daily as needed.    ondansetron (ZOFRAN) 4 MG tablet Take 1 tablet (4 mg total) by mouth every 8 (eight) hours as needed for Nausea.    promethazine-dextromethorphan (PROMETHAZINE-DM) 6.25-15 mg/5 mL Syrp Take 10 mLs by mouth every 6 (six) hours as needed.           Verify that the below list of medications is an accurate representation of the medications you are currently taking.  If none reported, the list may be blank. If incorrect, please contact your healthcare provider. Carry this list with you in case of emergency.           Current Medications     ACCU-CHEK FASTCLIX Misc     ACCU-CHEK SMARTVIEW TEST STRIP Strp TEST THREE TIMES DAILY    albuterol (PROAIR HFA) 90 mcg/actuation inhaler Inhale 2 puffs into the lungs every 4 (four) hours as needed. 2 HFA Aerosol Inhaler Inhalation Every 4-6 hours. Ventolin or PRoair for asthma    albuterol 90 mcg/actuation inhaler Inhale 2 puffs into the lungs every 6 (six) hours as needed for Wheezing.    ALCOHOL  "ANTISEPTIC PADS (ALCOHOL PREP PADS TOP)     amlodipine (NORVASC) 10 MG tablet TAKE 1 TABLET EVERY MORNING.    aspirin (ECOTRIN) 81 MG EC tablet Take 1 tablet by mouth Daily.    atorvastatin (LIPITOR) 40 MG tablet     BD ALCOHOL SWAB PadM     BD ULTRA-FINE OLU PEN NEEDLES 32 x 5/32 " Ndle Uses 4 times daily, on multiple daily insulin injections    carvedilol (COREG) 25 MG tablet TAKE 1 TABLET TWICE DAILY    cholecalciferol, vitamin D3, (VITAMIN D) 2,000 unit Cap Take by mouth. 1 Capsule Oral Every morning    duloxetine (CYMBALTA) 60 MG capsule Take 1 capsule (60 mg total) by mouth once daily.    ferrous sulfate 325 mg (65 mg iron) Tab tablet Take 1 tablet (325 mg total) by mouth once daily.    furosemide (LASIX) 40 MG tablet TAKE 1 TABLET twice daily    gabapentin (NEURONTIN) 100 MG capsule TAKE 1 CAPSULE THREE TIMES DAILY  AND TAKE 3 CAPSULES AT BEDTIME    insulin glargine (LANTUS SOLOSTAR) 100 unit/mL (3 mL) InPn pen Inject 54 Units into the skin every evening.    insulin lispro (HUMALOG KWIKPEN) 100 unit/mL InPn pen Inject 23 Units into the skin 3 (three) times daily with meals. Plus correction scale, for max TDD 84 units daily    Lactobacillus acidophilus (PROBIOTIC) 10 billion cell Cap Take 1 capsule by mouth once daily.    oxycodone-acetaminophen (PERCOCET)  mg per tablet Take 1 tablet by mouth 5 (five) times daily. Dose increased from 4 times daily to 5 times daily. Please fill early    oxycodone-acetaminophen (PERCOCET)  mg per tablet Take 1 tablet by mouth 5 (five) times daily.    oxycodone-acetaminophen (PERCOCET)  mg per tablet Starting on Jan 21, 2017. Take 1 tablet by mouth 5 (five) times daily.    valacyclovir (VALTREX) 500 MG tablet TAKE 1 TABLET ONE TIME DAILY    amoxicillin-clavulanate 875-125mg (AUGMENTIN) 875-125 mg per tablet Take 1 tablet by mouth every 12 (twelve) hours.    baclofen (LIORESAL) 10 MG tablet 1/2 - 1 tablet at bedtime as needed for muscle spasm    benzonatate " [facility]  Secondary Insurance: N/A    Evaluation Procedures:  No evaluation codes were used on this date of service    Timed Procedures:  Manual Therapy, 15 minutes  Therapeutic Exercise, 15 minutes    Untimed Procedures:  No untimed codes were used on this date of service    Total Treatment Time: 30 minutes        "(TESSALON) 200 MG capsule Take 1 capsule (200 mg total) by mouth every evening.    dicyclomine (BENTYL) 10 MG capsule Take 1 capsule (10 mg total) by mouth 3 (three) times daily as needed (abdominal pain).    doxycycline (MONODOX) 100 MG capsule Take 1 capsule (100 mg total) by mouth 2 (two) times daily.    FIRST AID CLOTH TAPE 1 X 10 "-yard Tape     FIRST AID TAPE 1 X 2.3 "-yard Bndg     methylPREDNISolone (MEDROL DOSEPACK) 4 mg tablet use as directed    nitroGLYCERIN (NITROSTAT) 0.4 MG SL tablet Place 1 tablet (0.4 mg total) under the tongue every 5 (five) minutes as needed.    pantoprazole (PROTONIX) 40 MG tablet TAKE 1 TABLET(40 MG) BY MOUTH EVERY DAY    promethazine (PHENERGAN) 6.25 mg/5 mL syrup Take 5 mLs (6.25 mg total) by mouth every 4 (four) hours as needed (itching & congestion).    triamcinolone acetonide 0.1% (KENALOG) 0.1 % cream Apply topically 3 (three) times daily.           Clinical Reference Information           Vital Signs - Last Recorded  Most recent update: 1/14/2017 10:55 AM by Leoncio Kumar MA    BP Pulse Temp Ht Wt SpO2    100/60 (BP Location: Left arm, Patient Position: Sitting, BP Method: Manual) 71 98.3 °F (36.8 °C) (Oral) 5' 8" (1.727 m) 107.5 kg (236 lb 15.9 oz) 98%    BMI                36.03 kg/m2          Blood Pressure          Most Recent Value    BP  100/60      Allergies as of 1/14/2017     Crestor [Rosuvastatin]    Ezetimibe    Hydrocodone    Lisinopril    Sulfa (Sulfonamide Antibiotics)    Sulfamethoxazole    Sulfamethoxazole-trimethoprim    Trimethoprim    Valsartan      Immunizations Administered on Date of Encounter - 1/14/2017     None      "

## 2017-03-28 ENCOUNTER — DOCUMENTATION ONLY (OUTPATIENT)
Dept: REHABILITATION | Facility: OTHER | Age: 77
End: 2017-03-28
Attending: PHYSICAL MEDICINE & REHABILITATION
Payer: MEDICARE

## 2017-03-28 NOTE — PROGRESS NOTES
Health  Wellness Visit Note    Name: Henrietta Villasenor  Clinic Number: [id number]  Physician: Lizy Le, *  Diagnosis: No diagnosis found.  Past Medical History:   Diagnosis Date    Abnormality of lung 11/08/2011    Stable bandlike opacities at the lung bases, most likely representing      Anxiety     Arthritis     CAD (coronary artery disease)     Cataract     CHF (congestive heart failure)     Chronic back pain 7/29/2012    Colon polyp 9/2010; 11/2010    Colon polyps 7/29/2012    Coronary artery disease involving native coronary artery of native heart with angina pectoris     Depression     Diabetes mellitus     Diabetes mellitus type II     Diverticulitis large intestine 7/27/2015    Diverticulosis 09/25/2010; 11/02/2010; 11/08/2011; 7/29/2012    Duodenal disorder 08/25/2011    Duodenal erosion noted on EGD.    Duodenal ulcer, unspecified as acute or chronic, without hemorrhage, perforation, or obstruction 8/24/2011    E. coli sepsis 12/2010    Due to left ureteral stone with left nephrostomy tube - hospitalized in [place]    Esophageal dysmotility 01/24/2012    Noted on upper GI-barium swallow.    Facial weakness 1969    Left facial weakness s/p left mastoidectomy in ~ 1969.    Fatty liver 11/08/2011    Reported on CT-abdomen and in 06/2012 Gastro clinic visit note.    Gastric polyp 09/29/2010    GERD (gastroesophageal reflux disease) 7/29/2012    Hepatomegaly 11/08/2011    Reported on CT-abdomen    Hiatal hernia 06/26/2006; 09/29/2010; 08/25/2011    Noted on barium swallow 2006; noted on  EGD 2011.   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    Trouble in sleeping     Type II or unspecified type diabetes mellitus with neurological manifestations, not stated as uncontrolled     Type II or unspecified type diabetes mellitus with peripheral circulatory disorders, not stated as uncontrolled(250.70)     Type II or unspecified type diabetes mellitus with renal manifestations, not stated as uncontrolled      Visit Number: 22  Precautions: (L) TKA, [place] (L) ear, COPD with SOB with increased exertion  Time In: 1:40PM  Time Out: 2:30PM  Total Treatment Time: 40minutes    Subjective:   Patient reports that her lower back has been feeling good overall; states that she will experience some "achyness" and discomfort in her lower back if she walks for an extended period of time; only time she has any type of back discomfort is when her activity level increases. Reports that she does feel good after exercising. Encouraged patient to ice after she completes any activity, mainly household chores and to stretch daily.     Objective:   Henrietta completed therapeutic stretches (EIL, MOUSTAPHA) and the following MedX exercise machines: core lumbar, torso rotation l/r, leg extension, leg curl, upright row, chest press, biceps curl, triceps extension, leg press    See exercise log in patient folder for rate of exertion and repetitions completed.     Assessment:   Patient tolerated all exercises on the MedX equipment without any increase in symptoms; iced lower back after completion of exercises.    Plan:  Continue with established plan of care towards wellness goals. Patient will meet me at [facility] to be introduced to " machines.    Health  : Maycol Mackay  3/28/2017

## 2017-04-03 ENCOUNTER — OFFICE VISIT (OUTPATIENT)
Dept: PODIATRY | Facility: CLINIC | Age: 77
End: 2017-04-03
Payer: MEDICARE

## 2017-04-03 VITALS
WEIGHT: 246 LBS | HEART RATE: 73 BPM | RESPIRATION RATE: 18 BRPM | SYSTOLIC BLOOD PRESSURE: 177 MMHG | DIASTOLIC BLOOD PRESSURE: 92 MMHG | HEIGHT: 68 IN | BODY MASS INDEX: 37.28 KG/M2

## 2017-04-03 DIAGNOSIS — B35.1 DERMATOPHYTOSIS OF NAIL: ICD-10-CM

## 2017-04-03 DIAGNOSIS — L84 CORN OR CALLUS: ICD-10-CM

## 2017-04-03 DIAGNOSIS — E11.49 TYPE II DIABETES MELLITUS WITH NEUROLOGICAL MANIFESTATIONS: Primary | ICD-10-CM

## 2017-04-03 PROCEDURE — 99499 UNLISTED E&M SERVICE: CPT | Mod: S$GLB,,, | Performed by: PODIATRIST

## 2017-04-03 PROCEDURE — 99999 PR PBB SHADOW E&M-EST. PATIENT-LVL III: CPT | Mod: PBBFAC,,, | Performed by: PODIATRIST

## 2017-04-03 PROCEDURE — 11056 PARNG/CUTG B9 HYPRKR LES 2-4: CPT | Mod: Q9,S$GLB,, | Performed by: PODIATRIST

## 2017-04-03 PROCEDURE — 11721 DEBRIDE NAIL 6 OR MORE: CPT | Mod: 59,Q9,S$GLB, | Performed by: PODIATRIST

## 2017-04-03 RX ORDER — ACETAMINOPHEN 500 MG/1
CAPSULE, LIQUID FILLED ORAL
COMMUNITY
Start: 2017-02-06 | End: 2017-05-17

## 2017-04-03 RX ORDER — PROMETHAZINE HYDROCHLORIDE AND DEXTROMETHORPHAN HYDROBROMIDE 6.25; 15 MG/5ML; MG/5ML
SYRUP ORAL
Refills: 1 | COMMUNITY
Start: 2017-03-14 | End: 2017-05-17

## 2017-04-03 NOTE — PROGRESS NOTES
Subjective:      Patient ID: Henrietta Villasenro is a 77 y.o. female.    Chief Complaint: PCP ( Jeannine Arriaga MD 3/20/17); Diabetic Foot Exam; Nail Care; and Foot Pain (bilateral foot pain )    Henrietta is a 77 y.o. female who presents to the clinic for evaluation and treatment of high risk feet. Henrietta has a past medical history of Abnormality of lung (11/08/2011); Anxiety; Arthritis; CAD (coronary artery disease); Cataract; CHF (congestive heart failure); Chronic back pain (7/29/2012); Colon polyp (9/2010; 11/2010); Colon polyps (7/29/2012); Coronary artery disease involving native coronary artery of native heart with angina pectoris; Depression; Diabetes mellitus; Diabetes mellitus type II; Diverticulitis large intestine (7/27/2015); Diverticulosis (09/25/2010; 11/02/2010; 11/08/2011; 7/29/2012); Duodenal disorder (08/25/2011); Duodenal ulcer, unspecified as acute or chronic, without hemorrhage, perforation, or obstruction (8/24/2011); E. 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); Obesity; MARLON (obstructive sleep apnea); Peripheral neuropathy; Pneumonia; Polyneuropathy; Postherpetic neuralgia; Recurrent nephrolithiasis; S/P knee replacement (10/2/2012); S/P TKR (total knee replacement) (12/26/2012); Sensorineural hearing loss of both ears; Thyroid disease (11/08/2011); Trouble in sleeping; Type II or unspecified type diabetes mellitus with neurological manifestations, not stated as uncontrolled;  Type II or unspecified type diabetes mellitus with peripheral circulatory disorders, not stated as uncontrolled; and Type II or unspecified type diabetes mellitus with renal manifestations, not stated as uncontrolled. The patient's chief complaint is long, thick toenails. This patient has documented high risk feet requiring routine maintenance secondary to diabetes mellitis and those secondary complications of diabetes, as mentioned..       PCP: Jeannine Arriaga MD    Date Last Seen by PCP:   Chief Complaint   Patient presents with    PCP      Jeannine Arriaga MD 3/20/17    Diabetic Foot Exam    Nail Care    Foot Pain     bilateral foot pain          Current shoe gear: casual shoes    Hemoglobin A1C   Date Value Ref Range Status   02/22/2017 8.1 (H) 4.5 - 6.2 % Final     Comment:     According to ADA guidelines, hemoglobin A1C <7.0% represents  optimal control in non-pregnant diabetic patients.  Different  metrics may apply to specific populations.   Standards of Medical Care in Diabetes - 2016.  For the purpose of screening for the presence of diabetes:  <5.7%     Consistent with the absence of diabetes  5.7-6.4%  Consistent with increasing risk for diabetes   (prediabetes)  >or=6.5%  Consistent with diabetes  Currently no consensus exists for use of hemoglobin A1C  for diagnosis of diabetes for children.     11/21/2016 8.0 (H) 4.5 - 6.2 % Final     Comment:     According to ADA guidelines, hemoglobin A1C <7.0% represents  optimal control in non-pregnant diabetic patients.  Different  metrics may apply to specific populations.   Standards of Medical Care in Diabetes - 2016.  For the purpose of screening for the presence of diabetes:  <5.7%     Consistent with the absence of diabetes  5.7-6.4%  Consistent with increasing risk for diabetes   (prediabetes)  >or=6.5%  Consistent with diabetes  Currently no consensus exists for use of hemoglobin A1C  for diagnosis of diabetes for children.     08/22/2016 8.4 (H)  "4.5 - 6.2 % Final     Comment:     According to ADA guidelines, hemoglobin A1C <7.0% represents  optimal control in non-pregnant diabetic patients.  Different  metrics may apply to specific populations.   Standards of Medical Care in Diabetes - 2016.  For the purpose of screening for the presence of diabetes:  <5.7%     Consistent with the absence of diabetes  5.7-6.4%  Consistent with increasing risk for diabetes   (prediabetes)  >or=6.5%  Consistent with diabetes  Currently no consensus exists for use of hemoglobin A1C  for diagnosis of diabetes for children.         Review of Systems   Constitution: Negative for chills, decreased appetite and fever.   Cardiovascular: Negative for leg swelling.   Skin: Positive for dry skin and nail changes.   Musculoskeletal: Positive for arthritis. Negative for back pain, falls, joint pain, joint swelling, muscle cramps and myalgias.   Gastrointestinal: Negative for nausea and vomiting.   Neurological: Positive for numbness and paresthesias. Negative for loss of balance.           Objective:       Vitals:    04/03/17 0932   BP: (!) 177/92   Pulse: 73   Resp: 18   Weight: 111.6 kg (246 lb)   Height: 5' 8" (1.727 m)   PainSc:   7   PainLoc: Foot        Physical Exam   Constitutional: She is oriented to person, place, and time. She appears well-developed and well-nourished.   Cardiovascular:   Dorsalis pedis and posterior tibial pulses are diminished Bilaterally. Toes are cool to touch. Feet are warm proximally.There is decreased digital hair. Skin is atrophic, slightly hyperpigmented, and mildly edematous       Musculoskeletal: Normal range of motion. She exhibits no edema or tenderness.   Adequate joint range of motion without pain, limitation, nor crepitation Bilateral feet and ankle joints. Muscle strength is 5/5 in all groups bilaterally.      semi reducible IPJ digital contracture 2nd toe b/l    Neurological: She is alert and oriented to person, place, and time. "   [place] 5.07 monofilamant testing is diminished M[place]or feet. Sharp/dull sensation diminished Bilaterally. Light touch absent Bilaterally.       Skin: Skin is warm, dry and intact. No abrasion, no bruising, no burn, no ecchymosis and no lesion noted. No erythema.   Nails x 10  are elongated by  2-7mm's, thickened by 2-5 mm's, dystrophic, and are darkened in  coloration . Xerosis Bilaterally. No open lesions noted.    Hyperkeratotic tissue noted to distal great toe b/l        Psychiatric: She has a normal mood and affect. Her behavior is normal.   Nursing note and vitals reviewed.            Assessment:       Encounter Diagnoses   Name Primary?    Type II diabetes mellitus with neurological manifestations Yes    Dermatophytosis of nail     Corn or callus          Plan:       Henrietta was seen today for pcp, diabetic foot exam, nail care and foot pain.    Diagnoses and all orders for this visit:    Type II diabetes mellitus with neurological manifestations  -     DIABETIC SHOES FOR HOME USE    Dermatophytosis of nail  -     DIABETIC SHOES FOR HOME USE    Corn or callus  -     DIABETIC SHOES FOR HOME USE      I counseled the patient on her conditions, their implications and medical management.    Shoe inspection. Diabetic Foot Education. Patient reminded of the importance of good nutrition and blood sugar control to help prevent podiatric complications of diabetes. Patient instructed on proper foot hygeine. We discussed wearing proper shoe gear, daily foot inspections, never walking without protective shoe gear, never putting sharp instruments to feet    - With patient's permission, nails were aggressively reduced and debrided x 10 to their soft tissue attachment mechanically and with electric , removing all offending nail and debris. Patient relates relief following the procedure. She will continue to monitor the areas daily, inspect her feet, wear protective shoe gear when ambulatory, moisturizer to  maintain skin integrity and follow in this office in approximately 2-3 months, sooner p.r.n.    - After cleansing the  area w/ alcohol prep pad the above mentioned hyperkeratosis was trimmed utilizing No 15 scapel, to a smooth base with out incident. Patient tolerated this  well and reported comfort to the area of distal great toe b/l     - Patient requires diabetic shoes due to high risk for ulceration and /or amputation. Patient was  dispensed a prescription for diabetic shoes with one pair of custom molded inserts. A list of potential orthotist  was also provided    - Return to clinic in 3 months or sooner if problems arise       .

## 2017-04-05 RX ORDER — DULOXETIN HYDROCHLORIDE 60 MG/1
CAPSULE, DELAYED RELEASE ORAL
Qty: 90 CAPSULE | Refills: 4 | Status: SHIPPED | OUTPATIENT
Start: 2017-04-05 | End: 2017-09-20

## 2017-04-10 ENCOUNTER — DOCUMENTATION ONLY (OUTPATIENT)
Dept: REHABILITATION | Facility: OTHER | Age: 77
End: 2017-04-10
Attending: PHYSICAL MEDICINE & REHABILITATION
Payer: MEDICARE

## 2017-04-10 NOTE — PROGRESS NOTES
Health  Wellness Visit Note    Name: Henrietta Villasenor  Clinic Number: [id number]  Physician: Lizy Le, *  Diagnosis: No diagnosis found.  Past Medical History:   Diagnosis Date    Abnormality of lung 11/08/2011    Stable bandlike opacities at the lung bases, most likely representing      Anxiety     Arthritis     CAD (coronary artery disease)     Cataract     CHF (congestive heart failure)     Chronic back pain 7/29/2012    Colon polyp 9/2010; 11/2010    Colon polyps 7/29/2012    Coronary artery disease involving native coronary artery of native heart with angina pectoris     Depression     Diabetes mellitus     Diabetes mellitus type II     Diverticulitis large intestine 7/27/2015    Diverticulosis 09/25/2010; 11/02/2010; 11/08/2011; 7/29/2012    Duodenal disorder 08/25/2011    Duodenal erosion noted on EGD.    Duodenal ulcer, unspecified as acute or chronic, without hemorrhage, perforation, or obstruction 8/24/2011    E. coli sepsis 12/2010    Due to left ureteral stone with left nephrostomy tube - hospitalized in [place]    Esophageal dysmotility 01/24/2012    Noted on upper GI-barium swallow.    Facial weakness 1969    Left facial weakness s/p left mastoidectomy in ~ 1969.    Fatty liver 11/08/2011    Reported on CT-abdomen and in 06/2012 Gastro clinic visit note.    Gastric polyp 09/29/2010    GERD (gastroesophageal reflux disease) 7/29/2012    Hepatomegaly 11/08/2011    Reported on CT-abdomen    Hiatal hernia 06/26/2006; 09/29/2010; 08/25/2011    Noted on barium swallow 2006; noted on  EGD 2011.   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    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    Obesity     MARLON (obstructive sleep apnea)      Peripheral neuropathy     Pneumonia     Polyneuropathy     Postherpetic neuralgia     Recurrent nephrolithiasis     S/P knee replacement 10/2/2012    S/P TKR (total knee replacement) 12/26/2012    Sensorineural hearing loss of both ears     Mild to moderate degree hearing loss    Thyroid disease 11/08/2011    Thyroid nodules reported on imaging study.    Trouble in sleeping     Type II or unspecified type diabetes mellitus with neurological manifestations, not stated as uncontrolled     Type II or unspecified type diabetes mellitus with peripheral circulatory disorders, not stated as uncontrolled     Type II or unspecified type diabetes mellitus with renal manifestations, not stated as uncontrolled      Visit Number: [id number]  Precautions: (L) TKA, [place] (L) ear, COPD with SOB with increased exertion  Time In: 3:00PM  Time Out: 3:45PM  Total Treatment Time: 45minutes    Subjective:   Patient reports that her lower back has been feeling good overall. Over the weekend she walked a great deal to and from the [place]. Said she did not get any pain but was just tired. She did her stretches after. She was surprised that she did not get any extra pain while walking, seeing that she usually does. Reports that she does feel good after exercising. Encouraged patient to ice after she completes any activity, mainly household chores and to stretch daily.     Objective:   Henrietta was introduced to machine setup at [facility]. She completed a couple reps on each machine to feel comfortable. She was introduced to: core lumbar, torso rotation l/r, leg extension, leg curl, upright row, chest press, biceps curl, triceps extension, leg press, hip abduction, hip adduction.     See exercise log in patient folder for rate of exertion and repetitions completed.     Assessment:   Patient seemed to feel comfortable with setup of machines. Says she will only learn with trying.    Plan:  Pt was DC form [facility] today, will continue at [facility] on her  own.     Health  : Maycol Mackay  4/10/2017

## 2017-04-12 DIAGNOSIS — Z12.11 ENCOUNTER FOR COLONOSCOPY DUE TO HISTORY OF COLONIC POLYP: Primary | ICD-10-CM

## 2017-04-12 DIAGNOSIS — Z86.010 ENCOUNTER FOR COLONOSCOPY DUE TO HISTORY OF COLONIC POLYP: Primary | ICD-10-CM

## 2017-04-12 RX ORDER — POLYETHYLENE GLYCOL 3350, SODIUM SULFATE ANHYDROUS, SODIUM BICARBONATE, SODIUM CHLORIDE, POTASSIUM CHLORIDE 236; 22.74; 6.74; 5.86; 2.97 G/4L; G/4L; G/4L; G/4L; G/4L
4 POWDER, FOR SOLUTION ORAL ONCE
Qty: 4000 ML | Refills: 0 | Status: SHIPPED | OUTPATIENT
Start: 2017-04-12 | End: 2017-04-12

## 2017-04-13 DIAGNOSIS — E11.9 DIABETES MELLITUS WITHOUT COMPLICATION: ICD-10-CM

## 2017-05-17 ENCOUNTER — OFFICE VISIT (OUTPATIENT)
Dept: INTERNAL MEDICINE | Facility: CLINIC | Age: 77
End: 2017-05-17
Payer: MEDICARE

## 2017-05-17 VITALS
SYSTOLIC BLOOD PRESSURE: 110 MMHG | BODY MASS INDEX: 35.21 KG/M2 | OXYGEN SATURATION: 97 % | WEIGHT: 232.31 LBS | HEIGHT: 68 IN | HEART RATE: 69 BPM | DIASTOLIC BLOOD PRESSURE: 60 MMHG

## 2017-05-17 DIAGNOSIS — N18.30 TYPE 2 DIABETES MELLITUS WITH STAGE 3 CHRONIC KIDNEY DISEASE, WITH LONG-TERM CURRENT USE OF INSULIN: ICD-10-CM

## 2017-05-17 DIAGNOSIS — I10 ESSENTIAL HYPERTENSION: Chronic | ICD-10-CM

## 2017-05-17 DIAGNOSIS — E11.22 TYPE 2 DIABETES MELLITUS WITH STAGE 3 CHRONIC KIDNEY DISEASE, WITH LONG-TERM CURRENT USE OF INSULIN: ICD-10-CM

## 2017-05-17 DIAGNOSIS — K57.32 DIVERTICULITIS OF LARGE INTESTINE WITHOUT PERFORATION OR ABSCESS WITHOUT BLEEDING: Primary | ICD-10-CM

## 2017-05-17 DIAGNOSIS — R11.0 NAUSEA: ICD-10-CM

## 2017-05-17 DIAGNOSIS — Z79.4 TYPE 2 DIABETES MELLITUS WITH STAGE 3 CHRONIC KIDNEY DISEASE, WITH LONG-TERM CURRENT USE OF INSULIN: ICD-10-CM

## 2017-05-17 DIAGNOSIS — E13.9 DIABETES MELLITUS DUE TO ABNORMAL INSULIN: ICD-10-CM

## 2017-05-17 PROCEDURE — 99999 PR PBB SHADOW E&M-EST. PATIENT-LVL III: CPT | Mod: PBBFAC,,, | Performed by: INTERNAL MEDICINE

## 2017-05-17 PROCEDURE — 99213 OFFICE O/P EST LOW 20 MIN: CPT | Mod: PBBFAC | Performed by: INTERNAL MEDICINE

## 2017-05-17 PROCEDURE — 99214 OFFICE O/P EST MOD 30 MIN: CPT | Mod: 25,S$GLB,, | Performed by: INTERNAL MEDICINE

## 2017-05-17 PROCEDURE — 96372 THER/PROPH/DIAG INJ SC/IM: CPT | Mod: PBBFAC

## 2017-05-17 PROCEDURE — 99499 UNLISTED E&M SERVICE: CPT | Mod: S$GLB,,, | Performed by: INTERNAL MEDICINE

## 2017-05-17 PROCEDURE — 96372 THER/PROPH/DIAG INJ SC/IM: CPT | Mod: S$GLB,,, | Performed by: INTERNAL MEDICINE

## 2017-05-17 RX ORDER — CEFTRIAXONE 1 G/50ML
1 INJECTION, SOLUTION INTRAVENOUS
Status: DISCONTINUED | OUTPATIENT
Start: 2017-05-17 | End: 2017-05-17

## 2017-05-17 RX ORDER — OXYCODONE AND ACETAMINOPHEN 10; 325 MG/1; MG/1
1 TABLET ORAL
Qty: 150 TABLET | Refills: 0 | Status: SHIPPED | OUTPATIENT
Start: 2017-06-17 | End: 2017-06-16

## 2017-05-17 RX ORDER — CEFTRIAXONE 1 G/1
1 INJECTION, POWDER, FOR SOLUTION INTRAMUSCULAR; INTRAVENOUS
Status: COMPLETED | OUTPATIENT
Start: 2017-05-17 | End: 2017-05-17

## 2017-05-17 RX ORDER — OXYCODONE AND ACETAMINOPHEN 10; 325 MG/1; MG/1
1 TABLET ORAL
Qty: 150 TABLET | Refills: 0 | Status: SHIPPED | OUTPATIENT
Start: 2017-07-17 | End: 2017-06-16

## 2017-05-17 RX ORDER — METRONIDAZOLE 500 MG/1
500 TABLET ORAL EVERY 12 HOURS
Qty: 20 TABLET | Refills: 0 | Status: SHIPPED | OUTPATIENT
Start: 2017-05-17 | End: 2017-05-27

## 2017-05-17 RX ORDER — ONDANSETRON 4 MG/1
4 TABLET, FILM COATED ORAL EVERY 8 HOURS PRN
Qty: 30 TABLET | Refills: 3 | Status: SHIPPED | OUTPATIENT
Start: 2017-05-17 | End: 2018-09-19

## 2017-05-17 RX ORDER — CIPROFLOXACIN 500 MG/1
500 TABLET ORAL 2 TIMES DAILY
Qty: 20 TABLET | Refills: 0 | Status: SHIPPED | OUTPATIENT
Start: 2017-05-17 | End: 2017-05-27

## 2017-05-17 RX ORDER — OXYCODONE AND ACETAMINOPHEN 10; 325 MG/1; MG/1
1 TABLET ORAL
Qty: 150 TABLET | Refills: 0 | Status: SHIPPED | OUTPATIENT
Start: 2017-05-17 | End: 2017-08-18 | Stop reason: SDUPTHER

## 2017-05-17 RX ADMIN — CEFTRIAXONE 1 G: 1 INJECTION, POWDER, FOR SOLUTION INTRAMUSCULAR; INTRAVENOUS at 01:05

## 2017-05-17 NOTE — PROGRESS NOTES
CHIEF COMPLAINT: nausea      HISTORY OF PRESENT ILLNESS: This is a 77-year-old woman who presents due to nausea for the last several weeks. She is nauseated daily. She wakes up in the morning with a headache. Appetite is down. She has lost 14 pounds since 4/3/17.  She gets nauseated when she smells food. She is lightheaded at times.  No vomiting.  She has cramping in her lowering abdomen then she will have a very soft/watery stool.  No bloody stools or melana.  No fever or chills.  She has had night sweats for the last 4-5 days which is new for her.  She has been taking dicyclomine which is not helping. She has been taking pantoprazole 40 mg daily for acid.       Cough has improved. Wheezing resolved after prednisone taper. She has sinus congestion with clear drainage.  She continues to take loratadine 10 mg daily and flonase daily.      She saw Dr Le 1/31/17 and she has [facility]. Her back is doing a little better. She is now walking without a cane. She has occasional left hip pain. She is taking gabapentin 100 mg 1 capsule in am and 3 at bedtime. Dose has been increased due to foot pain. Foot pain is better on higher dose of gabapentin. She takes oxycodone apap 5/325 one tablet up to 5 times daily which keeps her back pain under control. She has to take oxycodone apap when she is active and does things. If she does not go out, she does not have have to take as much oxycodone apap.        She continues to take amlodipine 10 mg daily, aspirin 81 mg daily, coreg 25 mg twice daily and lasix 40 mg twice daily, and KCL 10 meq once daily for her diastolic dysfunction and hypertension. She denies lightheadedness. No chest pain or shortness of breath. She is now taking atorvastatin 40 mg once daily for her cholesterol.      She is taking lantus 56 units at night and Novolog 23 units with meals. Blood sugars have been fine - 100 - 110. No hypoglycemia.        PAST MEDICAL HISTORY:  "  1. Diabetes mellitus   2. Hypertension   3. Hyperlipidemia   4. Left heart catheterization January 2007 which revealed luminal irregularities in the LAD, left circumflex and right coronary artery. She had diastolic dysfunction and patent renal arteries.   5. Sleep apnea   6. Obesity.   7. Nephrolithiasis status post lithotripsy.   8. Reflux - had nonerosive gastropathy on EGD September 2007. Gastritis on EGD 9/2010  9. Hidradenitis suppurativa.   10. History of diverticulitis with a hospitalized October 2007.   11. Migraines   12. Obesity.   13. 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    14. Total hysterectomy in 1969.   15. Cholecystectomy was done at that time as well.   16. 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   17. Colon polyps on colonscopy 11/2010 - due 2013   18. Hospitalization 12/10 for e coli sepsis due to left ureteral stone with left nephrostomy tube in [place], [place]   19. Left knee replacement 9/2012      MEDICATIONS and ALLERGIES: Updated on EPIC.     PHYSICAL EXAMINATION:    /60 (BP Location: Left arm, Patient Position: Sitting, BP Method: Manual)  Pulse 69  Ht 5' 8" (1.727 m)  Wt 105.4 kg (232 lb 4.8 oz)  SpO2 97%  BMI 35.32 kg/m2     GENERAL: She is alert, oriented, no apparent distress. Affect within   normal limits.  Conjunctiva anicteric. . Oropharynx clear.   NECK: Supple.   Respiratory: Effort normal. Lungs clear  HEART: Regular rate and rhythm without murmurs, gallops or rubs.   No lower extremity edema.     ABDOMEN: soft, non distended,  bowel sounds present, no hepatosplenomgaly. Left lower quadrant tenderness. No rebound or guarding          ASSESSMENT AND PLAN:   1. Diverticulitis - Rocephin 1 gram IM. Cipro 500 mg twice daily and flagyl 500 mg twice daily for 10 days. Zofran for nausea  2. Diabetes- watch sugars since not eating well  3. Hypertension - hold lasix when do not have good oral " intake.    4. Hyperlipidemia - on lipitor  5. GERD - asx  6. Allergic rhinitis - stable  7. Diastolic congestive heart failure - stable.    8. CRI -labs  9. Obesity - discussed diet, exercise and weight loss  10. CAD -risk factor modification  11. Aortic atherosclerosis - risk factor modification  12. Tortuous aorta - HTN controlled  13. Colon polyps - Colonoscopy in 8/2013 - 2 polyps. Flex sig 11/2013 - mild granular mucosa. Recommended colonoscopy in 8/2018. Attempted colonoscopy 11/2016 du to diarrhea which has resolved. She had poor prep with hyperplastic rectal biopsy.   MMG 10/16  Prevnar 10/15 and flu shot 11/16  I will see her back in 10 days as scheduled. sooner if problems

## 2017-05-17 NOTE — MR AVS SNAPSHOT
[facility]  [address]  [place] [address]  Phone: [phone]  Fax: [phone]                  Henrietta Villasenor   2017 12:30 PM   Office Visit    Description:  Female : 1940   Provider:  Jeannine Arriaga MD   Department:  [facility] - Internal Medicine           Reason for Visit     Abdominal Pain           Diagnoses this Visit        Comments    Nausea    -  Primary     Diabetes mellitus due to abnormal insulin         Diverticulitis of large intestine without perforation or abscess without bleeding                To Do List           Future Appointments        Provider Department Dept Phone    2017 10:30 AM Jeannine Arriaga MD [facility] Internal Medicine [phone]    2017 11:30 AM Lizy Le MD [facility] [phone]    2017 9:00 AM Dorian Flores, DNP, NP [facility] Endocrinology [phone]      Your Future Surgeries/Procedures     2017   Surgery with Chris Storey MD   [facility] ([facility])    [address] [address] [phone]              Goals (5 Years of Data)     None       These Medications        Disp Refills Start End    ondansetron (ZOFRAN) 4 MG tablet 30 tablet 3 2017     Take 1 tablet (4 mg total) by mouth every 8 (eight) hours as needed for Nausea. - Oral    Pharmacy: [facility] [address] [place] [address] [address] AT [facility] Ph #: [phone]       ciprofloxacin HCl (CIPRO) 500 MG tablet 20 tablet 0 2017    Take 1 tablet (500 mg total) by mouth 2 (two) times daily. - Oral    Pharmacy: [facility] [address] AT [facility] Ph #: [phone]       metronidazole (FLAGYL) 500 MG tablet 20 tablet 0 2017    Take 1 tablet (500 mg total) by mouth every 12 (twelve) hours. - Oral     Pharmacy: [facility] [address] AT [facility] Ph #: [phone]       oxycodone-acetaminophen (PERCOCET)  mg per tablet 150 tablet 0 5/17/2017     Take 1 tablet by mouth 5 (five) times daily. Dose increased from 4 times daily to 5 times daily. Please fill early - Oral    Pharmacy: [facility] [address] AT [facility] Ph #: [phone]       oxycodone-acetaminophen (PERCOCET)  mg per tablet 150 tablet 0 6/17/2017     Take 1 tablet by mouth 5 (five) times daily. - Oral    Pharmacy: [facility] [address] AT [facility] Ph #: [phone]       oxycodone-acetaminophen (PERCOCET)  mg per tablet 150 tablet 0 7/17/2017     Take 1 tablet by mouth 5 (five) times daily. - Oral    Pharmacy: [facility] [address] AT [facility] Ph #: [phone]         [facility]     [facility] - 24/7 Assistance  Unless otherwise directed by your provider, please contact [facility], our nurse care line that is available for 24/7 assistance.     Registered nurses in the [facility] provide: appointment scheduling, clinical advisement, health education, and other advisory services.  Call: [phone] (toll free)               Medications           Message regarding Medications     Verify the changes and/or additions to your medication regime listed below are the same as discussed with your clinician today.  If any of these changes or additions are incorrect, please notify your healthcare provider.        START taking these NEW medications        Refills    ondansetron (ZOFRAN) 4 MG tablet 3    Sig: Take 1 tablet (4 mg total) by mouth every 8 (eight) hours as needed for Nausea.    Class: Normal    Route: Oral     "ciprofloxacin HCl (CIPRO) 500 MG tablet 0    Sig: Take 1 tablet (500 mg total) by mouth 2 (two) times daily.    Class: Normal    Route: Oral    metronidazole (FLAGYL) 500 MG tablet 0    Sig: Take 1 tablet (500 mg total) by mouth every 12 (twelve) hours.    Class: Normal    Route: Oral      These medications were administered today        Dose Freq    cefTRIAXone 1 gram/50 mL IVPB 1 g 1 g Clinic/HOD 1 time    Sig: Inject 50 mLs (1 g total) into the vein one time.    Class: Normal    Route: Intravenous      STOP taking these medications     acetaminophen (TYLENOL) 500 mg Cap     acetaminophen (TYLENOL) 500 mg Cap     benzonatate (TESSALON) 200 MG capsule Take 1 capsule (200 mg total) by mouth every evening.    esomeprazole (NEXIUM) 40 MG capsule     predniSONE (DELTASONE) 10 MG tablet 2 tablets daily for 3 days then one tablet daily for 4 days.    promethazine-dextromethorphan (PROMETHAZINE-DM) 6.25-15 mg/5 mL Syrp TK 10 ML PO Q 6 H PRN    valacyclovir (VALTREX) 500 MG tablet TAKE 1 TABLET ONE TIME DAILY           Verify that the below list of medications is an accurate representation of the medications you are currently taking.  If none reported, the list may be blank. If incorrect, please contact your healthcare provider. Carry this list with you in case of emergency.           Current Medications     ACCU-CHEK FASTCLIX Misc     ACCU-CHEK SMARTVIEW TEST STRIP Strp TEST THREE TIMES DAILY    ALCOHOL ANTISEPTIC PADS (ALCOHOL PREP PADS TOP)     amlodipine (NORVASC) 10 MG tablet TAKE 1 TABLET EVERY MORNING.    aspirin (ECOTRIN) 81 MG EC tablet Take 1 tablet by mouth Daily.    atorvastatin (LIPITOR) 40 MG tablet     BD ALCOHOL SWAB PadM     BD ULTRA-FINE OLU PEN NEEDLES 32 x 5/32 " Ndle Uses 4 times daily, on multiple daily insulin injections    carvedilol (COREG) 25 MG tablet TAKE 1 TABLET TWICE DAILY    cholecalciferol, vitamin D3, (VITAMIN D) 2,000 unit Cap Take by mouth. 1 Capsule Oral Every morning    dicyclomine " "(BENTYL) 10 MG capsule Take 1 capsule (10 mg total) by mouth 3 (three) times daily as needed (abdominal pain).    duloxetine (CYMBALTA) 60 MG capsule TAKE 1 CAPSULE ONE TIME DAILY    ferrous sulfate 325 mg (65 mg iron) Tab tablet Take 1 tablet (325 mg total) by mouth once daily.    FIRST AID CLOTH TAPE 1 X 10 "-yard Tape     FIRST AID TAPE 1 X 2.3 "-yard Bndg     fluticasone (FLONASE) 50 mcg/actuation nasal spray SHAKE LQ AND U 2 SPRAYS IEN QD    furosemide (LASIX) 40 MG tablet TAKE 1 TABLET twice daily    gabapentin (NEURONTIN) 100 MG capsule TAKE 1 CAPSULE THREE TIMES DAILY  AND TAKE 3 CAPSULES AT BEDTIME    guaifenesin-codeine 100-10 mg/5 ml (TUSSI-ORGANIDIN NR)  mg/5 mL syrup Take 10 mLs by mouth every 6 (six) hours as needed for Cough. Sedating, no driving after taking    insulin glargine (LANTUS SOLOSTAR) 100 unit/mL (3 mL) InPn pen Inject 56 Units into the skin every evening.    insulin lispro (HUMALOG KWIKPEN) 100 unit/mL InPn pen Inject 23 Units into the skin 3 (three) times daily with meals. Plus correction scale, for max TDD 84 units daily    Lactobacillus acidophilus (PROBIOTIC) 10 billion cell Cap Take 1 capsule by mouth once daily.    loratadine (CLARITIN) 10 mg tablet Take 1 tablet (10 mg total) by mouth once daily.    MUSCLE RUB, WITH CAMPHOR, 4-30-10 % Crea     nitroGLYCERIN (NITROSTAT) 0.4 MG SL tablet Place 1 tablet (0.4 mg total) under the tongue every 5 (five) minutes as needed.    oxycodone-acetaminophen (PERCOCET)  mg per tablet Take 1 tablet by mouth 5 (five) times daily. Dose increased from 4 times daily to 5 times daily. Please fill early    oxycodone-acetaminophen (PERCOCET)  mg per tablet Starting on Jun 17, 2017. Take 1 tablet by mouth 5 (five) times daily.    oxycodone-acetaminophen (PERCOCET)  mg per tablet Starting on Jul 17, 2017. Take 1 tablet by mouth 5 (five) times daily.    pantoprazole (PROTONIX) 40 MG tablet TAKE 1 TABLET(40 MG) BY MOUTH EVERY DAY    pen " "needle, diabetic (SURE-FINE PEN NEEDLES) 29 gauge x 1/2" Ndle Use 4 times daily    promethazine (PHENERGAN) 6.25 mg/5 mL syrup Take 5 mLs (6.25 mg total) by mouth every 4 (four) hours as needed (itching & congestion).    albuterol (PROAIR HFA) 90 mcg/actuation inhaler Inhale 2 puffs into the lungs every 4 (four) hours as needed. 2 HFA Aerosol Inhaler Inhalation Every 4-6 hours. Ventolin or PRoair for asthma    albuterol 90 mcg/actuation inhaler Inhale 2 puffs into the lungs every 6 (six) hours as needed for Wheezing.    baclofen (LIORESAL) 10 MG tablet 1/2 - 1 tablet at bedtime as needed for muscle spasm    ciprofloxacin HCl (CIPRO) 500 MG tablet Take 1 tablet (500 mg total) by mouth 2 (two) times daily.    metronidazole (FLAGYL) 500 MG tablet Take 1 tablet (500 mg total) by mouth every 12 (twelve) hours.    ondansetron (ZOFRAN) 4 MG tablet Take 1 tablet (4 mg total) by mouth every 8 (eight) hours as needed for Nausea.    triamcinolone acetonide 0.1% (KENALOG) 0.1 % cream Apply topically 3 (three) times daily.           Clinical Reference Information           Your Vitals Were     BP Pulse Height Weight SpO2 BMI    110/60 (BP Location: Left arm, Patient Position: Sitting, BP Method: Manual) 69 5' 8" (1.727 m) 105.4 kg (232 lb 4.8 oz) 97% 35.32 kg/m2      Blood Pressure          Most Recent Value    BP  110/60      Allergies as of 5/17/2017     Crestor [Rosuvastatin]    Ezetimibe    Hydrocodone    Lisinopril    Sulfa (Sulfonamide Antibiotics)    Sulfamethoxazole    Sulfamethoxazole-trimethoprim    Trimethoprim    Valsartan      Immunizations Administered on Date of Encounter - 5/17/2017     None      Orders Placed During Today's Visit     Future Labs/Procedures Expected by Expires    CBC auto differential  As directed 5/17/2018    Comprehensive metabolic panel  As directed 5/17/2018    Hemoglobin A1c  As directed 5/17/2018    Lipase  As directed 12/17/2017    TSH  As directed 5/17/2018      [facility] Sign-Up     " Activating your [facility] account is as easy as 1-2-3!     1) Visit [email or web address], select Sign Up Now, enter this activation code and your date of birth, then select Next.  Activation code not generated  Current Patient Portal Status: Account disabled      2) Create a username and password to use when you visit [facility] in the future and select a security question in case you lose your password and select Next.    3) Enter your e-mail address and click Sign Up!    Additional Information  If you have questions, please e-mail [email or web address] or call [phone] to talk to our [facility] staff. Remember, [facility] is NOT to be used for urgent needs. For medical emergencies, dial 911.         Language Assistance Services     ATTENTION: Language assistance services are available, free of charge. Please call [phone].      ATENCIÓN: Si habla español, tiene a collado disposición servicios gratuitos de asistencia lingüística. Llame al [phone].     CHÚ Ý: N?u b?n nói Ti?ng Vi?t, có các d?ch v? h? tr? ngôn ng? mi?n phí dành cho b?n. G?i s? [phone].         [facility] complies with applicable Federal civil rights laws and does not discriminate on the basis of race, color, national origin, age, disability, or sex.

## 2017-05-17 NOTE — PROGRESS NOTES
2 patient identifiers used (name and ). Allergies reviewed. Procedure tolerated well.  Instructed to remain for 15-20 minutes and report any adverse reaction

## 2017-05-22 ENCOUNTER — TELEPHONE (OUTPATIENT)
Dept: INTERNAL MEDICINE | Facility: CLINIC | Age: 77
End: 2017-05-22

## 2017-05-22 NOTE — TELEPHONE ENCOUNTER
----- Message from Jeannine Arriaga MD sent at 5/21/2017 11:56 PM CDT -----  Please notify pt  Your blood count, blood sugar, kidney function, liver function, thyroid function are fine  Blood sugars are averaging 194  Jeannine Arriaga M.D.

## 2017-05-26 ENCOUNTER — OFFICE VISIT (OUTPATIENT)
Dept: INTERNAL MEDICINE | Facility: CLINIC | Age: 77
End: 2017-05-26
Payer: MEDICARE

## 2017-05-26 VITALS
DIASTOLIC BLOOD PRESSURE: 80 MMHG | BODY MASS INDEX: 35.66 KG/M2 | HEART RATE: 67 BPM | SYSTOLIC BLOOD PRESSURE: 158 MMHG | WEIGHT: 234.5 LBS

## 2017-05-26 DIAGNOSIS — I10 ESSENTIAL HYPERTENSION: Chronic | ICD-10-CM

## 2017-05-26 DIAGNOSIS — E11.22 TYPE 2 DIABETES MELLITUS WITH STAGE 3 CHRONIC KIDNEY DISEASE, WITH LONG-TERM CURRENT USE OF INSULIN: ICD-10-CM

## 2017-05-26 DIAGNOSIS — R05.3 CHRONIC COUGH: ICD-10-CM

## 2017-05-26 DIAGNOSIS — Z79.4 INSULIN DEPENDENT TYPE 2 DIABETES MELLITUS: ICD-10-CM

## 2017-05-26 DIAGNOSIS — Z79.4 TYPE 2 DIABETES MELLITUS WITH STAGE 3 CHRONIC KIDNEY DISEASE, WITH LONG-TERM CURRENT USE OF INSULIN: ICD-10-CM

## 2017-05-26 DIAGNOSIS — N91.2 ABSENCE OF MENSTRUATION: ICD-10-CM

## 2017-05-26 DIAGNOSIS — N18.30 TYPE 2 DIABETES MELLITUS WITH STAGE 3 CHRONIC KIDNEY DISEASE, WITH LONG-TERM CURRENT USE OF INSULIN: ICD-10-CM

## 2017-05-26 DIAGNOSIS — K21.9 GASTROESOPHAGEAL REFLUX DISEASE WITHOUT ESOPHAGITIS: ICD-10-CM

## 2017-05-26 DIAGNOSIS — K57.32 DIVERTICULITIS OF LARGE INTESTINE WITHOUT PERFORATION OR ABSCESS WITHOUT BLEEDING: Primary | ICD-10-CM

## 2017-05-26 DIAGNOSIS — G47.30 SLEEP APNEA, UNSPECIFIED TYPE: ICD-10-CM

## 2017-05-26 DIAGNOSIS — E11.9 INSULIN DEPENDENT TYPE 2 DIABETES MELLITUS: ICD-10-CM

## 2017-05-26 LAB
CREAT UR-MCNC: 193 MG/DL
MICROALBUMIN UR DL<=1MG/L-MCNC: 41 UG/ML
MICROALBUMIN/CREATININE RATIO: 21.2 UG/MG

## 2017-05-26 PROCEDURE — 82570 ASSAY OF URINE CREATININE: CPT

## 2017-05-26 PROCEDURE — 99999 PR PBB SHADOW E&M-EST. PATIENT-LVL III: CPT | Mod: PBBFAC,,, | Performed by: INTERNAL MEDICINE

## 2017-05-26 PROCEDURE — 99213 OFFICE O/P EST LOW 20 MIN: CPT | Mod: PBBFAC | Performed by: INTERNAL MEDICINE

## 2017-05-26 PROCEDURE — 99214 OFFICE O/P EST MOD 30 MIN: CPT | Mod: S$PBB,,, | Performed by: INTERNAL MEDICINE

## 2017-05-26 PROCEDURE — 99499 UNLISTED E&M SERVICE: CPT | Mod: S$GLB,,, | Performed by: INTERNAL MEDICINE

## 2017-05-26 RX ORDER — CODEINE PHOSPHATE AND GUAIFENESIN 10; 100 MG/5ML; MG/5ML
10 SOLUTION ORAL EVERY 6 HOURS PRN
Qty: 473 ML | Refills: 1 | Status: SHIPPED | OUTPATIENT
Start: 2017-05-26 | End: 2017-08-18

## 2017-05-26 NOTE — PROGRESS NOTES
CHIEF COMPLAINT: Follow up of diverticulitis, back pain, hypertension, diastolic dysfunction     HISTORY OF PRESENT ILLNESS: This is a 77-year-old woman who presents for follow up of above. Her diverticulitis is better. Nausea is resolved. Abdominal pain is better. NO fever or chills.  No constipation or diarrhea.  She continues to take Cipro and flagyl twice daily. NO dysuria or hematuria. She has been taking pantoprazole 40 mg daily for acid.       She continues to have sinus congestion with clear drainage.  She continues to take loratadine 10 mg daily and flonase daily.     She saw Dr Le 1/31/17 and she has been doing [facility]. Her back is doing a little better. She is now walking without a cane. She has occasional left hip pain. She is taking gabapentin 100 mg 2 capsule in am and 2 at bedtime. Dose has been increased due to foot pain. Foot pain is better on higher dose of gabapentin. She takes oxycodone apap 5/325 one tablet up to 5 times daily which keeps her back pain under control. She has to take oxycodone apap when she is active and does things. If she does not go out, she does not have have to take as much oxycodone apap.        She continues to take amlodipine 10 mg daily, aspirin 81 mg daily, coreg 25 mg twice daily and lasix 40 mg twice daily, and KCL 10 meq once daily for her diastolic dysfunction and hypertension. She denies lightheadedness. No chest pain or shortness of breath. She is now taking atorvastatin 40 mg once daily for her cholesterol.     She is taking lantus 56 units at night and Novolog 23 units with meals. Blood sugars have been fine - 100 - 110. No hypoglycemia.       PAST MEDICAL HISTORY:   1. Diabetes mellitus   2. Hypertension   3. Hyperlipidemia   4. Left heart catheterization January 2007 which revealed luminal irregularities in the LAD, left circumflex and right coronary artery. She had diastolic dysfunction and patent renal arteries.   5. Sleep  apnea   6. Obesity.   7. Nephrolithiasis status post lithotripsy.   8. Reflux - had nonerosive gastropathy on EGD September 2007. Gastritis on EGD 9/2010  9. Hidradenitis suppurativa.   10. History of diverticulitis with a hospitalized October 2007.   11. Migraines   12. Obesity.   13. 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    14. Total hysterectomy in 1969.   15. Cholecystectomy was done at that time as well.   16. 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   17. Colon polyps on colonscopy 11/2010 - due 2013   18. Hospitalization 12/10 for e coli sepsis due to left ureteral stone with left nephrostomy tube in [place], [place]   19. Left knee replacement 9/2012      MEDICATIONS and ALLERGIES: Updated on EPIC.     PHYSICAL EXAMINATION:      GENERAL: She is alert, oriented, no apparent distress. Affect within   normal limits.  Conjunctiva anicteric. . Oropharynx clear.   NECK: Supple.   Respiratory: Effort normal. Lungs clear  HEART: Regular rate and rhythm without murmurs, gallops or rubs.   No lower extremity edema.     ABDOMEN: soft, non distended,  bowel sounds present, no hepatosplenomgaly. Left lower quadrant tenderness. No rebound or guarding        ASSESSMENT AND PLAN:   1. Diverticulitis - getting better  2. Diabetes- watch sugars closely  3. Hypertension - stable.    4. Hyperlipidemia - on lipitor  5. GERD - asx  6. Allergic rhinitis - stable  7. Diastolic congestive heart failure - stable.    8. CRI -stable  9. Obesity - discussed diet, exercise and weight loss  10. CAD -risk factor modification  11. Aortic atherosclerosis - risk factor modification  12. Tortuous aorta - HTN controlled  13. Colon polyps - Colonoscopy in 8/2013 - 2 polyps. Flex sig 11/2013 - mild granular mucosa. Recommended colonoscopy in 8/2018. Attempted colonoscopy 11/2016 du to diarrhea which has resolved. She had poor prep with hyperplastic rectal biopsy.   MMG  10/16  Prevnar 10/15 and flu shot 11/16  I will see her back in 3 months, sooner if problems

## 2017-06-08 ENCOUNTER — TELEPHONE (OUTPATIENT)
Dept: INTERNAL MEDICINE | Facility: CLINIC | Age: 77
End: 2017-06-08

## 2017-06-09 RX ORDER — PANTOPRAZOLE SODIUM 40 MG/1
TABLET, DELAYED RELEASE ORAL
Qty: 90 TABLET | Refills: 0 | Status: SHIPPED | OUTPATIENT
Start: 2017-06-09 | End: 2017-07-10 | Stop reason: SDUPTHER

## 2017-06-09 NOTE — TELEPHONE ENCOUNTER
----- Message from Miryam Meadows sent at 6/9/2017 11:41 AM CDT -----  Contact: Self/[phone]  Patient is returning a phone call.  Who left a message for the patient: Dolly Mendoza MA   Does patient know what this is regarding:  No  Comments:

## 2017-06-09 NOTE — TELEPHONE ENCOUNTER
----- Message from Miryam Meadows sent at 6/9/2017 11:41 AM CDT -----  Contact: Self/[phone]  Patient is returning a phone call.  Who left a message for the patient: Dolly Mendoza MA   Does patient know what this is regarding:  No  Comments:

## 2017-06-14 ENCOUNTER — ANESTHESIA EVENT (OUTPATIENT)
Dept: ENDOSCOPY | Facility: HOSPITAL | Age: 77
End: 2017-06-14
Payer: MEDICARE

## 2017-06-14 ENCOUNTER — SURGERY (OUTPATIENT)
Age: 77
End: 2017-06-14

## 2017-06-14 ENCOUNTER — ANESTHESIA (OUTPATIENT)
Dept: ENDOSCOPY | Facility: HOSPITAL | Age: 77
End: 2017-06-14
Payer: MEDICAID

## 2017-06-14 VITALS — RESPIRATION RATE: 35 BRPM

## 2017-06-14 PROBLEM — Z86.010 HISTORY OF COLON POLYPS: Status: ACTIVE | Noted: 2017-06-14

## 2017-06-14 PROBLEM — Z86.0100 HISTORY OF COLON POLYPS: Status: ACTIVE | Noted: 2017-06-14

## 2017-06-14 PROCEDURE — 25000003 PHARM REV CODE 250: Performed by: NURSE ANESTHETIST, CERTIFIED REGISTERED

## 2017-06-14 PROCEDURE — D9220A PRA ANESTHESIA: Mod: ANES,,, | Performed by: ANESTHESIOLOGY

## 2017-06-14 PROCEDURE — D9220A PRA ANESTHESIA: Mod: CRNA,,, | Performed by: NURSE ANESTHETIST, CERTIFIED REGISTERED

## 2017-06-14 PROCEDURE — 63600175 PHARM REV CODE 636 W HCPCS: Performed by: NURSE ANESTHETIST, CERTIFIED REGISTERED

## 2017-06-14 RX ORDER — PROPOFOL 10 MG/ML
VIAL (ML) INTRAVENOUS
Status: DISCONTINUED | OUTPATIENT
Start: 2017-06-14 | End: 2017-06-14

## 2017-06-14 RX ORDER — LIDOCAINE HCL/PF 100 MG/5ML
SYRINGE (ML) INTRAVENOUS
Status: DISCONTINUED | OUTPATIENT
Start: 2017-06-14 | End: 2017-06-14

## 2017-06-14 RX ADMIN — PROPOFOL 50 MG: 10 INJECTION, EMULSION INTRAVENOUS at 10:06

## 2017-06-14 RX ADMIN — PROPOFOL 30 MG: 10 INJECTION, EMULSION INTRAVENOUS at 10:06

## 2017-06-14 RX ADMIN — LIDOCAINE HYDROCHLORIDE 100 MG: 20 INJECTION, SOLUTION INTRAVENOUS at 10:06

## 2017-06-14 NOTE — TRANSFER OF CARE
"Anesthesia Transfer of Care Note    Patient: Henrietta Villasenor    Procedure(s) Performed: Procedure(s) (LRB):  COLONOSCOPY (N/A)    Patient location: PACU    Anesthesia Type: general    Transport from OR: Transported from OR on room air with adequate spontaneous ventilation    Post pain: adequate analgesia    Post assessment: no apparent anesthetic complications and tolerated procedure well    Post vital signs: stable    Level of consciousness: sedated    Nausea/Vomiting: no nausea/vomiting    Complications: none    Transfer of care protocol was followed      Last vitals:   Visit Vitals  BP (!) 186/86 (BP Location: Left arm, Patient Position: Lying, BP Method: Automatic)   Pulse 71   Temp 36.7 °C (98.1 °F) (Oral)   Resp 18   Ht 5' 8" (1.727 m)   Wt 108.9 kg (240 lb)   SpO2 98%   Breastfeeding? No   BMI 36.49 kg/m²     "

## 2017-06-14 NOTE — ANESTHESIA PREPROCEDURE EVALUATION
06/14/2017  Henrietta Villasenor is a 77 y.o., female.  Pre-operative evaluation for Procedure(s):  COLONOSCOPY      Henrietta Villasenor is a 77 y.o. female    Allergies   Allergen Reactions    Crestor [Rosuvastatin]      Cramping in legs    Ezetimibe      Other reaction(s): abdominal pain, Diarrhea    Hydrocodone      Other reaction(s): Itching    Lisinopril      Other reaction(s): cough      Sulfa (Sulfonamide Antibiotics)      Itching and Rash      Sulfamethoxazole     Sulfamethoxazole-Trimethoprim     Trimethoprim     Valsartan      Other reaction(s): Angioedema       Patient Active Problem List   Diagnosis    Essential hypertension    MARLON on CPAP    Nephrolithiasis    Gastroesophageal reflux disease without esophagitis    Hydradenitis    Migraines, neuralgic    Chronic back pain    Unspecified ptosis of eyelid    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    Neuralgia, neuritis, and radiculitis, unspecified    Unspecified mastoiditis    Calculus of ureter    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    Dermatitis due to drugs and medicines taken internally    Dermatochalasis    Diverticulosis of large intestine    Herpes zoster with other nervous system complications    Personal history of allergy to sulfonamides    Stiffness of joint, not elsewhere classified, hand    Left ventricular diastolic dysfunction with preserved systolic function    Knee pain    Anemia    Chronic right ear pain    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    Hearing loss, sensorineural    CKD stage G3a/A1, GFR 45 - 59 and albumin creatinine ratio <30 mg/g    Coronary artery disease involving native coronary artery of native heart with angina pectoris    Lactose intolerance    Allergic rhinitis    Hyperlipidemia    Aortic atherosclerosis    Spinal enthesopathy of lumbar  region    LLQ abdominal pain    Diverticulitis of large intestine without perforation or abscess without bleeding    Esophageal dysphagia    Fatty liver    CKD (chronic kidney disease), stage III    Painful swallowing    Left facial numbness    Neck pain on left side    Tortuous aorta    Insulin dependent diabetes mellitus    Shortness of breath    Diabetic neuropathy associated with type 2 diabetes mellitus    Type 2 diabetes mellitus with stage 3 chronic kidney disease    Obesity, Class II, BMI 35.0-39.9, with comorbidity (see actual BMI)    Decreased strength of trunk and back    Low hemoglobin and low hematocrit    History of colon polyps       Past Surgical History:   Procedure Laterality Date    BELPHAROPTOSIS REPAIR      s/p LAMBERTO levator repair - Dr. Dejesus    CARDIAC CATHETERIZATION      CARPAL TUNNEL RELEASE  3/13/2012    CHOLECYSTECTOMY      COLONOSCOPY N/A 11/16/2016    Procedure: COLONOSCOPY;  Surgeon: Chris Storey MD;  Location: [facility] ([address] [place] [address]);  Service: Endoscopy;  Laterality: N/A;    EXTRACORPOREAL SHOCK WAVE LITHOTRIPSY      HYSTERECTOMY      mastoid tumor removal  1969    Left mastoidectomy with residual left facial weakness.    NEPHROSTOMY      TOTAL KNEE ARTHROPLASTY  9/13/2012    Left           No results for input(s): HCT in the last 72 hours.  No results for input(s): PLT in the last 72 hours.  No results for input(s): K in the last 72 hours.  No results for input(s): CREATININE in the last 72 hours.  No results for input(s): GLU in the last 72 hours.  Invalid input(s): PT    Pre-op Assessment         Review of Systems  Anesthesia Hx:  No problems with previous Anesthesia    Social:  Non-Smoker, No Alcohol Use    Hematology/Oncology:     Oncology Normal    -- Anemia:   Cardiovascular:   Hypertension, well controlled Past MI (2011) CAD asymptomatic   Denies Angina.    Pulmonary:   COPD, mild Shortness of breath waks one hour in store with one or two rest stops  due to back pain, not SOB   Renal/:   Chronic Renal Disease, CRI    Neurological:   Denies TIA. Denies CVA. Denies Seizures. RIGHT facial droop since mastoid tumor 1969   Endocrine:   Diabetes, type 2, using insulin        Physical Exam  General:  Well nourished    Airway/Jaw/Neck:  Airway Findings: Mouth Opening: Normal Tongue: Normal  General Airway Assessment: Adult, Average  Mallampati: II  TM Distance: Normal, at least 6 cm  Jaw/Neck Findings:  Neck ROM: Normal ROM        Heart/Vascular:  Heart Findings: Rate: Normal  Rhythm: Regular Rhythm        Mental Status:  Mental Status Findings:  Cooperative, Alert and Oriented         Anesthesia Plan  Type of Anesthesia, risks & benefits discussed:  Anesthesia Type:  general  Patient's Preference:   Intra-op Monitoring Plan:   Intra-op Monitoring Plan Comments:   Post Op Pain Control Plan:   Post Op Pain Control Plan Comments: As per surgeon's plan  Induction:   IV  Beta Blocker:  Patient is on a Beta-Blocker and has received one dose within the past 24 hours (No further documentation required).       Informed Consent: Patient understands risks and agrees with Anesthesia plan.  Questions answered. Anesthesia consent signed with patient.  ASA Score: 3     Day of Surgery Review of History & Physical:    H&P update referred to the surgeon.         Ready For Surgery From Anesthesia Perspective.

## 2017-06-14 NOTE — ANESTHESIA POSTPROCEDURE EVALUATION
"Anesthesia Post Evaluation    Patient: Henrietta Villasenor    Procedure(s) Performed: Procedure(s) (LRB):  COLONOSCOPY (N/A)    Final Anesthesia Type: general  Patient location during evaluation: GI PACU  Patient participation: Yes- Able to Participate  Level of consciousness: awake and alert, oriented and awake  Post-procedure vital signs: reviewed and stable  Pain management: adequate  Airway patency: patent  PONV status at discharge: No PONV  Anesthetic complications: no      Cardiovascular status: blood pressure returned to baseline and hemodynamically stable  Respiratory status: unassisted, spontaneous ventilation and room air  Hydration status: euvolemic  Follow-up not needed.        Visit Vitals  BP (!) 168/78   Pulse 78   Temp 36.7 °C (98.1 °F) (Oral)   Resp 12   Ht 5' 8" (1.727 m)   Wt 108.9 kg (240 lb)   SpO2 (!) 93%   Breastfeeding? No   BMI 36.49 kg/m²       Pain/Annette Score: Pain Assessment Performed: Yes (6/14/2017 11:07 AM)  Presence of Pain: complains of pain/discomfort (6/14/2017  8:50 AM)  Annette Score: 10 (6/14/2017 11:07 AM)      "

## 2017-06-16 ENCOUNTER — OFFICE VISIT (OUTPATIENT)
Dept: ENDOCRINOLOGY | Facility: CLINIC | Age: 77
End: 2017-06-16
Payer: MEDICARE

## 2017-06-16 VITALS
SYSTOLIC BLOOD PRESSURE: 164 MMHG | HEART RATE: 64 BPM | BODY MASS INDEX: 34.25 KG/M2 | WEIGHT: 226 LBS | HEIGHT: 68 IN | DIASTOLIC BLOOD PRESSURE: 78 MMHG

## 2017-06-16 DIAGNOSIS — I10 ESSENTIAL HYPERTENSION: Chronic | ICD-10-CM

## 2017-06-16 DIAGNOSIS — E11.42 DIABETIC POLYNEUROPATHY ASSOCIATED WITH TYPE 2 DIABETES MELLITUS: Primary | ICD-10-CM

## 2017-06-16 DIAGNOSIS — E66.9 OBESITY (BMI 30-39.9): ICD-10-CM

## 2017-06-16 DIAGNOSIS — E11.22 TYPE 2 DIABETES MELLITUS WITH STAGE 3 CHRONIC KIDNEY DISEASE, WITH LONG-TERM CURRENT USE OF INSULIN: ICD-10-CM

## 2017-06-16 DIAGNOSIS — E78.5 HYPERLIPIDEMIA, UNSPECIFIED HYPERLIPIDEMIA TYPE: ICD-10-CM

## 2017-06-16 DIAGNOSIS — Z79.4 TYPE 2 DIABETES MELLITUS WITH STAGE 3 CHRONIC KIDNEY DISEASE, WITH LONG-TERM CURRENT USE OF INSULIN: ICD-10-CM

## 2017-06-16 DIAGNOSIS — E11.42 TYPE 2 DIABETES MELLITUS WITH DIABETIC POLYNEUROPATHY, WITH LONG-TERM CURRENT USE OF INSULIN: ICD-10-CM

## 2017-06-16 DIAGNOSIS — I25.119 CORONARY ARTERY DISEASE INVOLVING NATIVE CORONARY ARTERY OF NATIVE HEART WITH ANGINA PECTORIS: ICD-10-CM

## 2017-06-16 DIAGNOSIS — Z79.4 TYPE 2 DIABETES MELLITUS WITH DIABETIC POLYNEUROPATHY, WITH LONG-TERM CURRENT USE OF INSULIN: ICD-10-CM

## 2017-06-16 DIAGNOSIS — G47.33 OSA ON CPAP: Chronic | ICD-10-CM

## 2017-06-16 DIAGNOSIS — N18.30 TYPE 2 DIABETES MELLITUS WITH STAGE 3 CHRONIC KIDNEY DISEASE, WITH LONG-TERM CURRENT USE OF INSULIN: ICD-10-CM

## 2017-06-16 PROCEDURE — 1126F AMNT PAIN NOTED NONE PRSNT: CPT | Mod: S$GLB,,, | Performed by: NURSE PRACTITIONER

## 2017-06-16 PROCEDURE — 99999 PR PBB SHADOW E&M-EST. PATIENT-LVL IV: CPT | Mod: PBBFAC,,, | Performed by: NURSE PRACTITIONER

## 2017-06-16 PROCEDURE — 99214 OFFICE O/P EST MOD 30 MIN: CPT | Mod: S$GLB,,, | Performed by: NURSE PRACTITIONER

## 2017-06-16 PROCEDURE — 1159F MED LIST DOCD IN RCRD: CPT | Mod: S$GLB,,, | Performed by: NURSE PRACTITIONER

## 2017-06-16 RX ORDER — INSULIN GLARGINE 100 [IU]/ML
60 INJECTION, SOLUTION SUBCUTANEOUS NIGHTLY
Qty: 2 BOX | Refills: 3
Start: 2017-06-16 | End: 2017-12-12 | Stop reason: SDUPTHER

## 2017-06-16 NOTE — PROGRESS NOTES
"CC: Management of T2DM and review of chronic medical conditions as listed in the visit diagnosis.      HPI: Ms. Henrietta Villasenor is a 77 y.o. Black or  female who was diagnosed with Type 2 DM in the late 1990s. Started on orals, but has been on insulin since 2000. No hospitalizations for DM.  Previous [facility] patient.     A1C has increased since last visit.         Reports hyperglycemic readings occurred when she had diverticulitis and was undergoing treatment.     No hypoglycemia.     States appetite has been decreased for the past month, but it is improving.     Adjusts her Humalog dose based on her PO intake.     Appropriately skips Humalog when she doesn't eat.     Admits to drinking regular iced tea between meals. Admits to drinking regular soft drinks as well.     Takes Humalog in tandem with meals.     No current exercise.     CURRENT DIABETIC MEDS: Lantus 60 units in the evening; Humalog 23 units with each meal   Uses Vials or Pens: Pens  Type of Glucose Meter: Accu-Chek Rosita    Last Eye Exam: December 2016  Last Podiatry Exam: April 2017    Last DM Education Attended: Previous [facility] patient (last seen January 2016)    REVIEW OF SYSTEMS  General: no weakness or fatigue.   Eyes: (+) chronic blurry vision at night; no eye pain; (+) bilateral cataracts (has not had surgery)   Cardiac: no chest pain or palpitations.   Respiratory: no cough; occasional dyspnea.   GI: (+) abdominal pain and nausea (recently had colonoscopy 2 days ago)  Skin: no rashes or insulin injection site reactions.   Neuro: reports sharp shooting pains in feet at times; right side of mouth with chronic drooping; (+) chronic early morning headaches at times.   Endocrine: no polyuria, polydipsia, polyphagia.     Vital Signs  BP (!) 164/78   Pulse 64   Ht 5' 8" (1.727 m)   Wt 102.5 kg (226 lb)   BMI 34.36 kg/m²     Hemoglobin A1C   Date Value Ref Range Status   05/17/2017 8.4 (H) 4.5 - 6.2 % Final     Comment: "     According to ADA guidelines, hemoglobin A1C <7.0% represents  optimal control in non-pregnant diabetic patients.  Different  metrics may apply to specific populations.   Standards of Medical Care in Diabetes - 2016.  For the purpose of screening for the presence of diabetes:  <5.7%     Consistent with the absence of diabetes  5.7-6.4%  Consistent with increasing risk for diabetes   (prediabetes)  >or=6.5%  Consistent with diabetes  Currently no consensus exists for use of hemoglobin A1C  for diagnosis of diabetes for children.     02/22/2017 8.1 (H) 4.5 - 6.2 % Final     Comment:     According to ADA guidelines, hemoglobin A1C <7.0% represents  optimal control in non-pregnant diabetic patients.  Different  metrics may apply to specific populations.   Standards of Medical Care in Diabetes - 2016.  For the purpose of screening for the presence of diabetes:  <5.7%     Consistent with the absence of diabetes  5.7-6.4%  Consistent with increasing risk for diabetes   (prediabetes)  >or=6.5%  Consistent with diabetes  Currently no consensus exists for use of hemoglobin A1C  for diagnosis of diabetes for children.     11/21/2016 8.0 (H) 4.5 - 6.2 % Final     Comment:     According to ADA guidelines, hemoglobin A1C <7.0% represents  optimal control in non-pregnant diabetic patients.  Different  metrics may apply to specific populations.   Standards of Medical Care in Diabetes - 2016.  For the purpose of screening for the presence of diabetes:  <5.7%     Consistent with the absence of diabetes  5.7-6.4%  Consistent with increasing risk for diabetes   (prediabetes)  >or=6.5%  Consistent with diabetes  Currently no consensus exists for use of hemoglobin A1C  for diagnosis of diabetes for children.         Chemistry        Component Value Date/Time     05/17/2017 1310    K 3.6 05/17/2017 1310    CL 97 05/17/2017 1310    CO2 32 (H) 05/17/2017 1310    BUN 8 05/17/2017 1310    CREATININE 1.0 05/17/2017 1310     (H)  05/17/2017 1310        Component Value Date/Time    CALCIUM 9.6 05/17/2017 1310    ALKPHOS 81 05/17/2017 1310    AST 20 05/17/2017 1310    ALT 10 05/17/2017 1310    BILITOT 0.5 05/17/2017 1310          Lab Results   Component Value Date    CHOL 197 11/21/2016    CHOL 263 (H) 12/31/2015    CHOL 256 (H) 07/27/2015     Lab Results   Component Value Date    HDL 56 11/21/2016    HDL 46 12/31/2015    HDL 49 07/27/2015     Lab Results   Component Value Date    LDLCALC 122.2 11/21/2016    LDLCALC 189.4 (H) 12/31/2015    LDLCALC 187.8 (H) 07/27/2015     Lab Results   Component Value Date    TRIG 94 11/21/2016    TRIG 138 12/31/2015    TRIG 96 07/27/2015     Lab Results   Component Value Date    CHOLHDL 28.4 11/21/2016    CHOLHDL 17.5 (L) 12/31/2015    CHOLHDL 19.1 (L) 07/27/2015       Lab Results   Component Value Date    TSH 1.596 05/17/2017     Vit D, 25-Hydroxy   Date Value Ref Range Status   02/24/2016 38 30 - 96 ng/mL Final     Comment:     Vitamin D deficiency.........<10 ng/mL                              Vitamin D insufficiency......10-29 ng/mL       Vitamin D sufficiency........> or equal to 30 ng/mL  Vitamin D toxicity............>100 ng/mL       PHYSICAL EXAMINATION  Constitutional: Appears well, no distress  Neck: Supple, trachea midline.   Respiratory: CTA without wheezes, even and unlabored.  Cardiovascular: RRR; no carotid bruits or murmur.   Lymph: DP pulses  2+ bilaterally; no edema.   Skin: warm and dry; no injection site reactions; (+) acanthosis nigracans observed.  Neuro:patient alert and cooperative; steady gait with use of walking cane.  Feet:   Protective Sensation (w/ tuning fork):  Right: Decreased  Left: Decreased    Visual Inspection:  Normal -  Bilateral    Pedal Pulses (DP):   Right: Present  Left: Present    Assessment/Plan    1. Diabetic peripheral neuropathy associated with type 2 diabetes mellitus:   A1C uncontrolled, trended up.   Readings labile, not consistently elevated.   Continue  current insulin doses.   Discussed limited higher carb foods, drinks.   DM education for review of insulin, correction scale (150/25), carbohydrates.   Continue Gabapentin for neuropathy.   Monitor BG 4x/day. Logs to next visit.    2. Type 2 diabetes mellitus with stage 3 chronic kidney disease:   Avoid hypoglycemia. No Nephrology visit in >1 year. F/U with Nephrology.    3. Essential hypertension: Continue  Amlodipine, Carvedilol, and Lasix.    4. Hyperlipidemia:  No current medications for lipids. Lipids improved. Reports she was taken off of Crestor due to leg cramps that were intolerable. Monitor.    5. Obesity (BMI 30-39.9): Body mass index is 34.36 kg/m². Can worsen insulin resistance, but weight loss can help this. Lower carb foods. Exercise as tolerated.     6. Coronary artery disease involving native coronary artery of native heart with angina pectoris: Managed by Cardiology.    7.  MARLON on c-pap: Continue C-pap      FOLLOW UP  Return in about 4 months (around 10/16/2017).     Orders Placed This Encounter   Procedures    Hemoglobin A1c     Standing Status:   Future     Standing Expiration Date:   6/16/2018

## 2017-06-21 ENCOUNTER — TELEPHONE (OUTPATIENT)
Dept: ENDOSCOPY | Facility: HOSPITAL | Age: 77
End: 2017-06-21

## 2017-06-23 ENCOUNTER — CLINICAL SUPPORT (OUTPATIENT)
Dept: DIABETES | Facility: CLINIC | Age: 77
End: 2017-06-23
Payer: MEDICARE

## 2017-06-23 DIAGNOSIS — Z79.4 TYPE 2 DIABETES MELLITUS WITH STAGE 3 CHRONIC KIDNEY DISEASE, WITH LONG-TERM CURRENT USE OF INSULIN: ICD-10-CM

## 2017-06-23 DIAGNOSIS — N18.30 TYPE 2 DIABETES MELLITUS WITH STAGE 3 CHRONIC KIDNEY DISEASE, WITH LONG-TERM CURRENT USE OF INSULIN: ICD-10-CM

## 2017-06-23 DIAGNOSIS — E11.22 TYPE 2 DIABETES MELLITUS WITH STAGE 3 CHRONIC KIDNEY DISEASE, WITH LONG-TERM CURRENT USE OF INSULIN: ICD-10-CM

## 2017-06-23 PROCEDURE — G0108 DIAB MANAGE TRN  PER INDIV: HCPCS | Mod: S$GLB,,, | Performed by: DIETITIAN, REGISTERED

## 2017-06-23 NOTE — PROGRESS NOTES
"Diabetes Education  Author: Jamee Palafox RD  Date: 6/23/2017    Diabetes Education Visit  Diabetes Education Record Assessment/Progress: Initial    Diabetes Type  Diabetes Type : Type II    Diabetes History  Diabetes Diagnosis: >10 years    Nutrition  Meal Planning: skipping meals, drinks regular soda (skipping meals d/t nausea, drinking regular sweet tea, reports has switched to diet coke or sprite zero)  Meal Plan 24 Hour Recall - Breakfast: toast, egg, coffee with equal, sometimes a yogurt  Meal Plan 24 Hour Recall - Lunch: usually a snack  Meal Plan 24 Hour Recall - Dinner: beans and rice OR meat, starch, vegetable  Meal Plan 24 Hour Recall - Snack: not snacking between    Monitoring   Self Monitoring : SMBG 3 times daily - did not bring log today - reports ranging 100s-170s  Blood Glucose Logs: No         Current Diabetes Treatment   Current Treatment: Insulin (Humalog 23 units + correction 150, ISF 1:25 before each meal, Lantus 60 units every evening)    Social History  Preferred Learning Method: Face to Face, Reading Materials  Primary Support: Self          Barriers to Change  Barriers to Change: None  Learning Challenges : None    Readiness to Learn   Readiness to Learn : Acceptance    Cultural Influences  Cultural Influences: No    Diabetes Education Assessment/Progress    Acute Complications (preventing, detecting, and treating acute complications): Discussion, Instructed, Written Materials Provided, Individual Session, Competent (verbalizes/demonstrates) (Reviewed s/s of hypoglycemia and proper treatment with "rule of 15." Reports has not had any hypos since last endocrine NP visit. )    Diabetes Disease Process (diabetes disease process and treatment options): Discussion, Instructed, Competent (verbalizes/demonstrates), Written Materials Provided, Individual Session    Nutrition (Incorporating nutritional management into one's lifestyle): Discussion, Instructed, Competent (verbalizes/demonstrates), " Written Materials Provided, Individual Session (Reviewed sources of CHO, serving sizes, timing/spacing of meals, and rec'd eating pattern with 30-45g CHO/meal, 3 meals daily. )    Medications (states correct name, dose, onset, peak, duration, side effects & timing of meds): Discussion, Instructed, Competent (verbalizes/demonstrates), Written Materials Provided, Demonstration, Individual Session (Reviewed MDI regimen and proper use of correction scale with Humalog only. Explained base dose vs correction. Encouraged always take dose 5-15 min before eating meal. Provided examples of using correction scale and demonstrated with demo pen. Pt was able to verbalized proper use. She reports appropriate site selection and rotation and only skips Humalog when she skips a meal. Sometimes forgets to take Lantus - encouraged taking Lantus same time every night.)    Monitoring (monitoring blood glucose/other parameters & using results): Discussion, Instructed, Competent (verbalizes/demonstrates), Written Materials Provided, Individual Session (Reviewed SMBG 4 times daily AC/HS, goal BG readings, keeping log, and bringing log to appts. Provided additional log sheets. )    Goal Setting and Problem Solving (verbalizes behavior change strategies & sets realistic goals): Discussion, Individual Session    Behavior Change (developing personal strategies to health & behavior change): Discussion, Individual Session    Goals  Healthy Eating: Set (Will avoid sugary beverages.)  Start Date: 06/23/17  Target Date: 09/23/17  Medications: Set (Will use correction scale properly as prescribed by NP.)  Start Date: 06/23/17  Target Date: 09/23/17         Diabetes Care Plan/Intervention  Education Plan/Intervention: Individual Follow-Up DSMT    Diabetes Meal Plan  Carbohydrate Per Meal: 30-45g    Education Units of Time   Time Spent: 60 min      Health Maintenance Topics with due status: Not Due       Topic Last Completion Date    TETANUS VACCINE  03/20/2014    Influenza Vaccine 11/21/2016    Lipid Panel 11/21/2016    Eye Exam 12/12/2016    Hemoglobin A1c 05/17/2017    Urine Microalbumin 05/26/2017    Colonoscopy 06/14/2017    Foot Exam 06/16/2017     Health Maintenance Due   Topic Date Due    DEXA SCAN  11/02/2014

## 2017-06-30 ENCOUNTER — NURSE TRIAGE (OUTPATIENT)
Dept: ADMINISTRATIVE | Facility: CLINIC | Age: 77
End: 2017-06-30

## 2017-07-01 ENCOUNTER — HOSPITAL ENCOUNTER (EMERGENCY)
Facility: HOSPITAL | Age: 77
Discharge: HOME | End: 2017-07-01
Attending: FAMILY MEDICINE
Payer: MEDICARE

## 2017-07-01 VITALS
HEIGHT: 68 IN | OXYGEN SATURATION: 98 % | TEMPERATURE: 97 F | BODY MASS INDEX: 34.25 KG/M2 | DIASTOLIC BLOOD PRESSURE: 99 MMHG | HEART RATE: 85 BPM | RESPIRATION RATE: 17 BRPM | WEIGHT: 226 LBS | SYSTOLIC BLOOD PRESSURE: 219 MMHG

## 2017-07-01 DIAGNOSIS — M79.10 MYALGIA: ICD-10-CM

## 2017-07-01 DIAGNOSIS — G44.209 ACUTE NON INTRACTABLE TENSION-TYPE HEADACHE: Primary | ICD-10-CM

## 2017-07-01 LAB
ALBUMIN SERPL BCP-MCNC: 3.2 G/DL
ALP SERPL-CCNC: 92 U/L
ALT SERPL W/O P-5'-P-CCNC: 10 U/L
ANION GAP SERPL CALC-SCNC: 7 MMOL/L
AST SERPL-CCNC: 17 U/L
BASOPHILS # BLD AUTO: 0.05 K/UL
BASOPHILS NFR BLD: 0.5 %
BILIRUB SERPL-MCNC: 0.5 MG/DL
BUN SERPL-MCNC: 11 MG/DL
CALCIUM SERPL-MCNC: 10 MG/DL
CHLORIDE SERPL-SCNC: 100 MMOL/L
CO2 SERPL-SCNC: 35 MMOL/L
CREAT SERPL-MCNC: 0.9 MG/DL
CRP SERPL-MCNC: 7.8 MG/L
DIFFERENTIAL METHOD: NORMAL
EOSINOPHIL # BLD AUTO: 0.3 K/UL
EOSINOPHIL NFR BLD: 3.3 %
ERYTHROCYTE [DISTWIDTH] IN BLOOD BY AUTOMATED COUNT: 14 %
ERYTHROCYTE [SEDIMENTATION RATE] IN BLOOD BY WESTERGREN METHOD: 55 MM/HR
EST. GFR  (AFRICAN AMERICAN): >60 ML/MIN/1.73 M^2
EST. GFR  (NON AFRICAN AMERICAN): >60 ML/MIN/1.73 M^2
GLUCOSE SERPL-MCNC: 141 MG/DL
HCT VFR BLD AUTO: 38.3 %
HGB BLD-MCNC: 12.3 G/DL
LYMPHOCYTES # BLD AUTO: 2.3 K/UL
LYMPHOCYTES NFR BLD: 24.5 %
MCH RBC QN AUTO: 28 PG
MCHC RBC AUTO-ENTMCNC: 32.1 %
MCV RBC AUTO: 87 FL
MONOCYTES # BLD AUTO: 0.9 K/UL
MONOCYTES NFR BLD: 10.3 %
NEUTROPHILS # BLD AUTO: 5.6 K/UL
NEUTROPHILS NFR BLD: 61.2 %
PLATELET # BLD AUTO: 273 K/UL
PMV BLD AUTO: 9.9 FL
POTASSIUM SERPL-SCNC: 3.4 MMOL/L
PROT SERPL-MCNC: 7.7 G/DL
RBC # BLD AUTO: 4.39 M/UL
SODIUM SERPL-SCNC: 142 MMOL/L
WBC # BLD AUTO: 9.17 K/UL

## 2017-07-01 PROCEDURE — 85025 COMPLETE CBC W/AUTO DIFF WBC: CPT

## 2017-07-01 PROCEDURE — 80053 COMPREHEN METABOLIC PANEL: CPT

## 2017-07-01 PROCEDURE — 99284 EMERGENCY DEPT VISIT MOD MDM: CPT | Mod: ,,, | Performed by: FAMILY MEDICINE

## 2017-07-01 PROCEDURE — 86140 C-REACTIVE PROTEIN: CPT

## 2017-07-01 PROCEDURE — 99283 EMERGENCY DEPT VISIT LOW MDM: CPT

## 2017-07-01 PROCEDURE — 85651 RBC SED RATE NONAUTOMATED: CPT

## 2017-07-01 RX ORDER — BUTALBITAL, ACETAMINOPHEN AND CAFFEINE 50; 325; 40 MG/1; MG/1; MG/1
1 CAPSULE ORAL EVERY 6 HOURS PRN
Qty: 20 CAPSULE | Refills: 0 | Status: SHIPPED | OUTPATIENT
Start: 2017-07-01 | End: 2017-08-18

## 2017-07-01 NOTE — ED NOTES
Patient identifiers verified and correct for Ms Villasenor  C/C: Headache, and pain  APPEARANCE: awake and alert in NAD.  SKIN: warm, dry and intact. No breakdown or bruising.  MUSCULOSKELETAL: Patient moving all extremities spontaneously, no obvious swelling or deformities noted. Ambulates independently.  RESPIRATORY: no shortness of breath.Respirations unlabored. All breath sounds CTA bilaterally.  CARDIAC: heart tones normal. Denies CP today 2+ distal pulses; no peripheral edema  ABDO Pain to RLQ, , Denies nausea, normoactive bowel sounds present in all four quadrants. Normal stool pattern.  : voids spontaneously without difficulty.  Neurologic: AAO x 4; Right facial paralysis PTA. Occas uses walker.

## 2017-07-01 NOTE — ED TRIAGE NOTES
Patient states pain beginning in right lower knee and right lower abdomen that radiates up body in to neck and in to left head. States she wakes up every morning with a headache. States pain since May. States she took Percocet 10mg this am for headache.

## 2017-07-01 NOTE — ED PROVIDER NOTES
Encounter Date: 7/1/2017    SCRIBE #1 NOTE: Marcial CAO, am scribing for, and in the presence of, Dr. Streeter.       History     Chief Complaint   Patient presents with    Neck Pain     right side of neck pain and right sided abdominal pain x 2 week      Time patient was seen by the provider: 1:42 PM      The patient is a 77 y.o. female with hx of: HTN, DM, HLD, MARLON, CAD, diverticulosis, migraines, GERD, CHF, thyroid disease, pneumonia, and MI that presents to the ED with a complaint of right sided abdominal pain, which has persisted for the last 2 weeks. She notes an associated dizziness, trouble swallowing, right sided headache, mild shortness of breath. Reports finishing a regimen of antibiotics for diverticulitis approximately 1.5 months ago. Pt also complains of right thigh pain and right sided neck pain. She denies any fever or vomiting       The history is provided by the patient.     Review of patient's allergies indicates:   Allergen Reactions    Crestor [rosuvastatin]      Cramping in legs    Ezetimibe      Other reaction(s): abdominal pain, Diarrhea    Hydrocodone      Other reaction(s): Itching    Lisinopril      Other reaction(s): cough      Sulfa (sulfonamide antibiotics)      Itching and Rash      Sulfamethoxazole     Sulfamethoxazole-trimethoprim     Trimethoprim     Valsartan      Other reaction(s): Angioedema     Past Medical History:   Diagnosis Date    Abnormality of lung 11/08/2011    Stable bandlike opacities at the lung bases, most likely representing      Anxiety     Arthritis     CAD (coronary artery disease)     Cataract     CHF (congestive heart failure)     Chronic back pain 7/29/2012    Colon polyp 9/2010; 11/2010    Colon polyps 7/29/2012    Coronary artery disease involving native coronary artery of native heart with angina pectoris     Depression     Diabetes mellitus     Diabetes mellitus type II     Diverticulitis large intestine 7/27/2015     Diverticulosis 09/25/2010; 11/02/2010; 11/08/2011; 7/29/2012    Duodenal disorder 08/25/2011    Duodenal erosion noted on EGD.    Duodenal ulcer, unspecified as acute or chronic, without hemorrhage, perforation, or obstruction 8/24/2011    E. coli sepsis 12/2010    Due to left ureteral stone with left nephrostomy tube - hospitalized in [place]    Esophageal dysmotility 01/24/2012    Noted on upper GI-barium swallow.    Facial weakness 1969    Left facial weakness s/p left mastoidectomy in ~ 1969.    Fatty liver 11/08/2011    Reported on CT-abdomen and in 06/2012 Gastro clinic visit note.    Gastric polyp 09/29/2010    GERD (gastroesophageal reflux disease) 7/29/2012    Hepatomegaly 11/08/2011    Reported on CT-abdomen    Hiatal hernia 06/26/2006; 09/29/2010; 08/25/2011    Noted on barium swallow 2006; noted on  EGD 2011.   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    Trouble in sleeping     Type II or unspecified type diabetes mellitus with neurological manifestations, not stated as uncontrolled     Type II or unspecified type diabetes mellitus with peripheral circulatory disorders, not stated as uncontrolled     Type II or unspecified type diabetes mellitus with renal manifestations, not stated as  uncontrolled      Past Surgical History:   Procedure Laterality Date    BELPHAROPTOSIS REPAIR      s/p LAMBERTO levator repair - Dr. Dejesus    CARDIAC CATHETERIZATION      CARPAL TUNNEL RELEASE  3/13/2012    CHOLECYSTECTOMY      COLONOSCOPY N/A 11/16/2016    Procedure: COLONOSCOPY;  Surgeon: Chris Storey MD;  Location: [facility] ENDO (4TH FLR);  Service: Endoscopy;  Laterality: N/A;    COLONOSCOPY N/A 6/14/2017    Procedure: COLONOSCOPY;  Surgeon: Chris Storey MD;  Location: [facility] ([facility] FLR);  Service: Endoscopy;  Laterality: N/A;  colonoscopy in 3 months with better bowel prep. - Split PEG prep ordered    EXTRACORPOREAL SHOCK WAVE LITHOTRIPSY      HYSTERECTOMY      mastoid tumor removal  1969    Left mastoidectomy with residual left facial weakness.    NEPHROSTOMY      TOTAL KNEE ARTHROPLASTY  9/13/2012    Left     Family History   Problem Relation Age of Onset    Sleep apnea Sister     Cancer Sister      breast    Diabetes Sister     Cancer Mother      brain tumor    Hypertension Mother     Heart disease Father     Heart attack Father     Dementia Brother     Diabetes Brother     Lupus Brother     No Known Problems Daughter     No Known Problems Son     Diabetes Sister     Lupus Sister     Diabetes Sister     Cancer Sister     Kidney disease Sister     Heart disease Sister     Liver cancer Brother     Cirrhosis Brother     No Known Problems Daughter     No Known Problems Son     No Known Problems Son     Diabetes Other     Ovarian cancer Other      Niece     Glaucoma Neg Hx     Blindness Neg Hx     Celiac disease Neg Hx     Colon cancer Neg Hx     Colon polyps Neg Hx     Esophageal cancer Neg Hx     Inflammatory bowel disease Neg Hx     Liver disease Neg Hx     Rectal cancer Neg Hx     Stomach cancer Neg Hx     Ulcerative colitis Neg Hx     Melanoma Neg Hx     Multiple sclerosis Neg Hx     Psoriasis Neg Hx      Social History   Substance Use Topics    Smoking  status: Former Smoker     Packs/day: 1.00     Years: 15.00     Types: Cigarettes     Quit date: 7/24/2000    Smokeless tobacco: Never Used    Alcohol use No     Review of Systems   Constitutional: Negative for fever.   HENT: Positive for trouble swallowing. Negative for sore throat.    Respiratory: Positive for shortness of breath.    Cardiovascular: Negative for chest pain.   Gastrointestinal: Positive for abdominal pain. Negative for nausea and vomiting.   Genitourinary: Negative for dysuria.   Musculoskeletal: Positive for neck pain. Negative for back pain.        Thigh pain   Skin: Negative for rash.   Neurological: Positive for dizziness and headaches. Negative for weakness.   Hematological: Does not bruise/bleed easily.       Physical Exam     Initial Vitals [07/01/17 1207]   BP Pulse Resp Temp SpO2   (!) 219/99 85 17 97.4 °F (36.3 °C) 98 %      MAP       139         Physical Exam    Nursing note and vitals reviewed.  Constitutional: No distress.   HENT:   Head: Atraumatic.   Neck: Neck supple.   Mild tenderness in the right paraspinous muscle, no deformity    Cardiovascular: Normal rate and regular rhythm.   Pulmonary/Chest: Breath sounds normal. No respiratory distress. She has no wheezes.   Abdominal: Soft. There is no tenderness.   Musculoskeletal:   Upper extremities full range of motion. Lower extremities full range of motion. Non of this reproduces the pt's right sided pain. Radial and pedal pulses equal bilaterally    Neurological: She is alert and oriented to person, place, and time.   No focal deficits    Skin: Skin is warm and dry.         ED Course   Procedures  Labs Reviewed   CBC W/ AUTO DIFFERENTIAL   COMPREHENSIVE METABOLIC PANEL   SEDIMENTATION RATE, MANUAL   C-REACTIVE PROTEIN             Medical Decision Making:   ED Management:  Your blood tests do not suggest a new developing arthritis or other problem requiring us to treat you with steroids.      We will try placing you on a new  headache medicine to help w/ that pain.  Follow up w/ your PCP if your other symptoms persist.             Scribe Attestation:   Scribe #1: I performed the above scribed service and the documentation accurately describes the services I performed. I attest to the accuracy of the note.    Attending Attestation:           Physician Attestation for Scribe:  Physician Attestation Statement for Scribe #1: I, Dr. Streeter, reviewed documentation, as scribed by Marcial Scott in my presence, and it is both accurate and complete.                 ED Course     Clinical Impression:   There were no encounter diagnoses.                           Domo Streeter MD  07/01/17 1700

## 2017-07-01 NOTE — ED NOTES
"Blood for lab studies collected using a 23ga 1" butterfly needle.  Tolerated well with no bleeding or bruising noted at puncture site.  Pressure dressing applied.  "

## 2017-07-05 RX ORDER — FUROSEMIDE 40 MG/1
TABLET ORAL
Qty: 270 TABLET | Refills: 3 | Status: SHIPPED | OUTPATIENT
Start: 2017-07-05 | End: 2018-12-03 | Stop reason: SDUPTHER

## 2017-07-05 NOTE — TELEPHONE ENCOUNTER
----- Message from Mitzi Land sent at 7/5/2017  9:29 AM CDT -----  Contact: Call Pt [phone]  Pt called requesting to be seen before 08-09-17 for an ER F/U

## 2017-07-10 ENCOUNTER — OFFICE VISIT (OUTPATIENT)
Dept: INTERNAL MEDICINE | Facility: CLINIC | Age: 77
End: 2017-07-10
Payer: MEDICARE

## 2017-07-10 VITALS
HEART RATE: 76 BPM | OXYGEN SATURATION: 98 % | DIASTOLIC BLOOD PRESSURE: 80 MMHG | HEIGHT: 68 IN | SYSTOLIC BLOOD PRESSURE: 132 MMHG

## 2017-07-10 DIAGNOSIS — M54.2 NECK PAIN ON RIGHT SIDE: Primary | ICD-10-CM

## 2017-07-10 DIAGNOSIS — N39.0 URINARY TRACT INFECTION WITHOUT HEMATURIA, SITE UNSPECIFIED: ICD-10-CM

## 2017-07-10 DIAGNOSIS — K76.0 FATTY LIVER: ICD-10-CM

## 2017-07-10 DIAGNOSIS — E78.5 HYPERLIPIDEMIA, UNSPECIFIED HYPERLIPIDEMIA TYPE: ICD-10-CM

## 2017-07-10 DIAGNOSIS — K21.9 GASTROESOPHAGEAL REFLUX DISEASE, ESOPHAGITIS PRESENCE NOT SPECIFIED: ICD-10-CM

## 2017-07-10 DIAGNOSIS — E13.9 DIABETES MELLITUS DUE TO ABNORMAL INSULIN: ICD-10-CM

## 2017-07-10 LAB
BILIRUB UR QL STRIP: NEGATIVE
CLARITY UR REFRACT.AUTO: CLEAR
COLOR UR AUTO: YELLOW
GLUCOSE UR QL STRIP: NEGATIVE
HGB UR QL STRIP: NEGATIVE
KETONES UR QL STRIP: NEGATIVE
LEUKOCYTE ESTERASE UR QL STRIP: NEGATIVE
NITRITE UR QL STRIP: NEGATIVE
PH UR STRIP: 7 [PH] (ref 5–8)
PROT UR QL STRIP: NEGATIVE
SP GR UR STRIP: 1.01 (ref 1–1.03)
URN SPEC COLLECT METH UR: NORMAL
UROBILINOGEN UR STRIP-ACNC: NEGATIVE EU/DL

## 2017-07-10 PROCEDURE — 99499 UNLISTED E&M SERVICE: CPT | Mod: S$GLB,,, | Performed by: INTERNAL MEDICINE

## 2017-07-10 PROCEDURE — 99999 PR PBB SHADOW E&M-EST. PATIENT-LVL III: CPT | Mod: PBBFAC,,, | Performed by: INTERNAL MEDICINE

## 2017-07-10 PROCEDURE — 99214 OFFICE O/P EST MOD 30 MIN: CPT | Mod: S$GLB,,, | Performed by: INTERNAL MEDICINE

## 2017-07-10 PROCEDURE — 81003 URINALYSIS AUTO W/O SCOPE: CPT

## 2017-07-10 PROCEDURE — 1159F MED LIST DOCD IN RCRD: CPT | Mod: S$GLB,,, | Performed by: INTERNAL MEDICINE

## 2017-07-10 PROCEDURE — 87086 URINE CULTURE/COLONY COUNT: CPT

## 2017-07-10 RX ORDER — PANTOPRAZOLE SODIUM 40 MG/1
TABLET, DELAYED RELEASE ORAL
Qty: 180 TABLET | Refills: 4 | Status: SHIPPED | OUTPATIENT
Start: 2017-07-10 | End: 2019-07-31 | Stop reason: SDUPTHER

## 2017-07-10 RX ORDER — TIZANIDINE 4 MG/1
4 TABLET ORAL NIGHTLY PRN
Qty: 30 TABLET | Refills: 3 | Status: SHIPPED | OUTPATIENT
Start: 2017-07-10 | End: 2018-09-06 | Stop reason: SDUPTHER

## 2017-07-10 NOTE — PROGRESS NOTES
CHIEF COMPLAINT: ER follow up    HISTORY OF PRESENT ILLNESS: This is a 77-year-old woman who presents for ER follow up. She presented to the ED on 7/1/17 due to right sided headache and right sided neck pain. She was given Fioricet to take as needed for headaches. She takes the Fioricet once daily which helped her headaches. She has occasional right sided neck pain and right headache.    She had diarrhea last week.  NO diarrhea now. She has a BM daily. She had some abdominal pain last week, it is not as bad, slight now. NO fever or chills, nausea, vomiting, dysuria or hematuria. She has been taking pantoprazole 40 mg daily for acid. She continues have a lot of heartburn and is taking ranitidine 150 mg daily which helps.       She continues to have sinus congestion with clear drainage.  She continues to take loratadine 10 mg daily and flonase daily.     She saw Dr Le 1/31/17 and [facility]. Her back is doing a little better. She is now walking without a cane. She has occasional left hip pain. She is taking gabapentin 100 mg 1 capsule three times daily and 3 at bedtime. Dose has been increased due to foot pain. Foot pain is better on higher dose of gabapentin. She takes oxycodone apap 5/325 one tablet up to 5 times daily which keeps her back pain under control. She has to take oxycodone apap when she is active and does things. If she does not go out, she does not have have to take as much oxycodone apap.        She continues to take amlodipine 10 mg daily, aspirin 81 mg daily, coreg 25 mg twice daily and lasix 40 mg twice daily, and KCL 10 meq once daily for her diastolic dysfunction and hypertension. She denies lightheadedness. No chest pain or shortness of breath. She is now taking atorvastatin 40 mg once daily for her cholesterol.     She is taking lantus 56 units at night and Novolog 23 units with meals. Blood sugars have been fine - 100 - 110. No hypoglycemia.  "      PAST MEDICAL HISTORY:   1. Diabetes mellitus   2. Hypertension   3. Hyperlipidemia   4. Left heart catheterization January 2007 which revealed luminal irregularities in the LAD, left circumflex and right coronary artery. She had diastolic dysfunction and patent renal arteries.   5. Sleep apnea   6. Obesity.   7. Nephrolithiasis status post lithotripsy.   8. Reflux - had nonerosive gastropathy on EGD September 2007. Gastritis on EGD 9/2010  9. Hidradenitis suppurativa.   10. History of diverticulitis with a hospitalized October 2007.   11. Migraines   12. Obesity.   13. 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    14. Total hysterectomy in 1969.   15. Cholecystectomy was done at that time as well.   16. 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   17. Colon polyps on colonscopy 11/2010 - due 2013   18. Hospitalization 12/10 for e coli sepsis due to left ureteral stone with left nephrostomy tube in [place], [place]   19. Left knee replacement 9/2012      MEDICATIONS and ALLERGIES: Updated on EPIC.     PHYSICAL EXAMINATION:    /80   Pulse 76   Ht 5' 8" (1.727 m)   SpO2 98%     GENERAL: She is alert, oriented, no apparent distress. Affect within   normal limits.  Conjunctiva anicteric. . Oropharynx clear.   NECK: Supple.   Respiratory: Effort normal. Lungs clear  HEART: Regular rate and rhythm without murmurs, gallops or rubs.   No lower extremity edema.     ABDOMEN: soft, non distended,  bowel sounds present, no hepatosplenomgaly. Left lower quadrant tenderness. No rebound or guarding        ASSESSMENT AND PLAN:   1. Right sided neck pain - muscle spasm - tizanidine 4 mg at bedtime  2. GERD - increase pantoprazole to 40 mg twice daily and add ranitidine 300 mg nightly  3. Diabetes- watch sugars closely  4. Hypertension - stable.    5. Hyperlipidemia - on lipitor  6. Allergic rhinitis - stable  7. Diastolic congestive heart failure - " stable.    8. CRI -stable  9. Obesity - discussed diet, exercise and weight loss  10. CAD -risk factor modification  11. Aortic atherosclerosis - risk factor modification  12. Tortuous aorta - HTN controlled  13. Colon polyps - Colonoscopy in 8/2013 - 2 polyps. Flex sig 11/2013 - mild granular mucosa. Recommended colonoscopy in 8/2018. Attempted colonoscopy 11/2016 du to diarrhea which has resolved. She had poor prep with hyperplastic rectal biopsy.   MMG 10/16  Prevnar 10/15 and flu shot 11/16  I will see her back as scheudled, sooner if problems

## 2017-07-12 LAB
BACTERIA UR CULT: NORMAL
BACTERIA UR CULT: NORMAL

## 2017-07-19 ENCOUNTER — NURSE TRIAGE (OUTPATIENT)
Dept: ADMINISTRATIVE | Facility: CLINIC | Age: 77
End: 2017-07-19

## 2017-07-19 NOTE — TELEPHONE ENCOUNTER
"    Reason for Disposition   [1] SEVERE pain (e.g., excruciating) AND [2] present > 1 hour    Answer Assessment - Initial Assessment Questions  1. RESPIRATORY STATUS: "Describe your breathing?" (e.g., wheezing, shortness of breath, unable to speak, severe coughing)       2. ONSET: "When did this asthma attack begin?"       *No Answer*  3. TRIGGER: "What do you think triggered this attack?" (e.g., URI, exposure to pollen or other allergen, tobacco smoke)       *No Answer*  4. PEAK EXPIRATORY FLOW RATE (PEFR): "Do you use a peak flow meter?" If so, ask: "What's the current peak flow? What's your personal best peak flow?"       *No Answer*  5. SEVERITY: "How bad is this attack?"     - MILD: No SOB at rest, mild SOB with walking, speaks normally in sentences, can lay down, no retractions, pulse < 100. (GREEN Zone: PEFR %)    - MODERATE: SOB at rest, SOB with minimal exertion and prefers to sit, cannot lie down flat, speaks in phrases, mild retractions, audible wheezing, pulse 100-120. (YELLOW Zone: PEFR 50-80%)     - SEVERE: Very SOB at rest, speaks in single words, struggling to breathe, sitting hunched forward, retractions, usually loud wheezing, sometimes minimal wheezing because of decreased air movement, pulse > 120. (RED Zone: PEFR < 50%).       *No Answer*  6. MEDICATIONS (Inhaler or nebs): "What are your asthma medications?" and "What treatments have you given so far?"     - Quick-relief: albuterol, metaproterenol, salbutamol, or other inhaled or nebulized beta-agonist medicines    - Long-term-control: steroids, cromolyn, or other anti-inflammatory medicines.      *No Answer*  7. OTHER SYMPTOMS: "Do you have any other symptoms? (e.g., runny nose, chest pain, fever)      *No Answer*  8. PREGNANCY: "Is there any chance you are pregnant?" "When was your last menstrual period?"      *No Answer*    Answer Assessment - Initial Assessment Questions  1. LOCATION: "Where does it hurt?"       No SOB, no CP, Hx " "asthma. Left side hurting all night. Level with belly button all night   2. RADIATION: "Does the pain shoot anywhere else?" (e.g., chest, back)     No   3. ONSET: "When did the pain begin?" (e.g., minutes, hours or days ago)      Last pm   4. SUDDEN: "Gradual or sudden onset?"      gradual   5. PATTERN "Does the pain come and go, or is it constant?"     - If constant: "Is it getting better, staying the same, or worsening?"       (Note: Constant means the pain never goes away completely; most serious pain is constant and it progresses)      - If intermittent: "How long does it last?" "Do you have pain now?"      (Note: Intermittent means the pain goes away completely between bouts)     Constant since last night, mostly when coughing   6. SEVERITY: "How bad is the pain?"  (e.g., Scale 1-10; mild, moderate, or severe)    - MILD (1-3): doesn't interfere with normal activities, abdomen soft and not tender to touch     - MODERATE (4-7): interferes with normal activities or awakens from sleep, tender to touch     - SEVERE (8-10): excruciating pain, doubled over, unable to do any normal activities      9 , starts from belly button around to back   7. RECURRENT SYMPTOM: "Have you ever had this type of abdominal pain before?" If so, ask: "When was the last time?" and "What happened that time?"      No   8. CAUSE: "What do you think is causing the abdominal pain?"     Cough   9. RELIEVING/AGGRAVATING FACTORS: "What makes it better or worse?" (e.g., movement, antacids, bowel movement)     No   10. OTHER SYMPTOMS: "Has there been any vomiting, diarrhea, constipation, or urine problems?"      No    Protocols used: ST ABDOMINAL PAIN - FEMALE-A-AH, ST ASTHMA ATTACK-A-AH  rec ED due to level 9 since last pm, coughing, Hx asthma,age. Sent office message to MD office as pt states that she does not want to go to ED.     "

## 2017-07-21 ENCOUNTER — OFFICE VISIT (OUTPATIENT)
Dept: INTERNAL MEDICINE | Facility: CLINIC | Age: 77
End: 2017-07-21
Payer: MEDICARE

## 2017-07-21 VITALS
BODY MASS INDEX: 34.25 KG/M2 | WEIGHT: 226 LBS | HEIGHT: 68 IN | HEART RATE: 74 BPM | TEMPERATURE: 98 F | SYSTOLIC BLOOD PRESSURE: 138 MMHG | OXYGEN SATURATION: 98 % | DIASTOLIC BLOOD PRESSURE: 74 MMHG

## 2017-07-21 DIAGNOSIS — J06.9 UPPER RESPIRATORY TRACT INFECTION, UNSPECIFIED TYPE: Primary | ICD-10-CM

## 2017-07-21 PROCEDURE — 99499 UNLISTED E&M SERVICE: CPT | Mod: S$GLB,,, | Performed by: INTERNAL MEDICINE

## 2017-07-21 PROCEDURE — 94640 AIRWAY INHALATION TREATMENT: CPT | Mod: S$GLB,,, | Performed by: INTERNAL MEDICINE

## 2017-07-21 PROCEDURE — 99999 PR PBB SHADOW E&M-EST. PATIENT-LVL III: CPT | Mod: PBBFAC,,, | Performed by: INTERNAL MEDICINE

## 2017-07-21 PROCEDURE — 99213 OFFICE O/P EST LOW 20 MIN: CPT | Mod: 25,S$GLB,, | Performed by: INTERNAL MEDICINE

## 2017-07-21 RX ORDER — IPRATROPIUM BROMIDE AND ALBUTEROL SULFATE 2.5; .5 MG/3ML; MG/3ML
3 SOLUTION RESPIRATORY (INHALATION)
Status: COMPLETED | OUTPATIENT
Start: 2017-07-21 | End: 2017-07-21

## 2017-07-21 RX ORDER — ASPIRIN 81 MG/1
TABLET ORAL
COMMUNITY
Start: 2017-06-26 | End: 2017-07-21 | Stop reason: SDUPTHER

## 2017-07-21 RX ORDER — CODEINE PHOSPHATE AND GUAIFENESIN 10; 100 MG/5ML; MG/5ML
5 SOLUTION ORAL NIGHTLY PRN
Qty: 118 ML | Refills: 0 | Status: SHIPPED | OUTPATIENT
Start: 2017-07-21 | End: 2017-07-31

## 2017-07-21 RX ORDER — AMOXICILLIN 500 MG/1
500 TABLET, FILM COATED ORAL EVERY 12 HOURS
Qty: 20 TABLET | Refills: 0 | Status: SHIPPED | OUTPATIENT
Start: 2017-07-21 | End: 2017-07-31

## 2017-07-21 RX ADMIN — IPRATROPIUM BROMIDE AND ALBUTEROL SULFATE 3 ML: 2.5; .5 SOLUTION RESPIRATORY (INHALATION) at 03:07

## 2017-07-21 NOTE — PROGRESS NOTES
Subjective:       Patient ID: Henrietta Villasenor is a 77 y.o. female.    Chief Complaint: Cough    HPI     Patient is a 77 year old female here today for cough and congestion x 1 week.  Sx include sinus pressure, headache, fatigue, sore throat, productive cough, post nasal drip, left ear popping. No upset stomach or diarrhea. Feels like her chest/abdomen is sore from coughing all night. No sob or wheezing right now. No fever/chills.    Review of Systems   Constitutional: Positive for fatigue. Negative for chills and fever.   HENT: Positive for congestion, hearing loss, postnasal drip, rhinorrhea, sinus pressure, sore throat and trouble swallowing.    Eyes: Negative for visual disturbance.   Respiratory: Positive for cough. Negative for shortness of breath and wheezing.    Gastrointestinal: Negative for abdominal pain, diarrhea, nausea and vomiting.   Musculoskeletal: Negative for myalgias.   Skin: Negative for rash.   Neurological: Positive for headaches.       Objective:      Physical Exam   Constitutional: She is oriented to person, place, and time. She appears well-developed and well-nourished. No distress.   HENT:   Head: Normocephalic and atraumatic.   Right Ear: External ear normal.   Left Ear: External ear normal.     Posterior oropharyngeal erythema  No tonsillar exudate   Eyes: Conjunctivae and EOM are normal. Pupils are equal, round, and reactive to light.   Neck: Normal range of motion. Neck supple. No thyromegaly present.   Cardiovascular: Normal rate, regular rhythm and intact distal pulses.    No murmur heard.  Pulmonary/Chest: Effort normal and breath sounds normal. No respiratory distress. She has no wheezes. She has no rales.   Musculoskeletal: She exhibits no edema.   Lymphadenopathy:     She has no cervical adenopathy.   Neurological: She is alert and oriented to person, place, and time.   Skin: Skin is warm and dry. She is not diaphoretic.   Nursing note and vitals reviewed.      Assessment:       1.  Upper respiratory tract infection, unspecified type        Plan:       URI  Duoneb now  Start amoxil 500 mg BID x 10 days, GI side effects reviewed  Not a good candidate for steroids given poorly controlled DM, insulin dependent  Rx for cheratussin nightly prn cough provided  RTC 1 week for follow up

## 2017-07-27 ENCOUNTER — OFFICE VISIT (OUTPATIENT)
Dept: INTERNAL MEDICINE | Facility: CLINIC | Age: 77
End: 2017-07-27
Payer: MEDICARE

## 2017-07-27 VITALS
HEART RATE: 71 BPM | BODY MASS INDEX: 34.89 KG/M2 | WEIGHT: 230.19 LBS | DIASTOLIC BLOOD PRESSURE: 70 MMHG | HEIGHT: 68 IN | SYSTOLIC BLOOD PRESSURE: 114 MMHG | OXYGEN SATURATION: 98 %

## 2017-07-27 DIAGNOSIS — N18.30 TYPE 2 DIABETES MELLITUS WITH STAGE 3 CHRONIC KIDNEY DISEASE, WITH LONG-TERM CURRENT USE OF INSULIN: ICD-10-CM

## 2017-07-27 DIAGNOSIS — R10.9 ABDOMINAL SPASMS: Primary | ICD-10-CM

## 2017-07-27 DIAGNOSIS — E11.22 TYPE 2 DIABETES MELLITUS WITH STAGE 3 CHRONIC KIDNEY DISEASE, WITH LONG-TERM CURRENT USE OF INSULIN: ICD-10-CM

## 2017-07-27 DIAGNOSIS — Z79.4 TYPE 2 DIABETES MELLITUS WITH STAGE 3 CHRONIC KIDNEY DISEASE, WITH LONG-TERM CURRENT USE OF INSULIN: ICD-10-CM

## 2017-07-27 DIAGNOSIS — I10 ESSENTIAL HYPERTENSION: Chronic | ICD-10-CM

## 2017-07-27 DIAGNOSIS — K21.9 GASTROESOPHAGEAL REFLUX DISEASE WITHOUT ESOPHAGITIS: ICD-10-CM

## 2017-07-27 PROCEDURE — 1159F MED LIST DOCD IN RCRD: CPT | Mod: S$GLB,,, | Performed by: INTERNAL MEDICINE

## 2017-07-27 PROCEDURE — 99999 PR PBB SHADOW E&M-EST. PATIENT-LVL III: CPT | Mod: PBBFAC,,, | Performed by: INTERNAL MEDICINE

## 2017-07-27 PROCEDURE — 1125F AMNT PAIN NOTED PAIN PRSNT: CPT | Mod: S$GLB,,, | Performed by: INTERNAL MEDICINE

## 2017-07-27 PROCEDURE — 99214 OFFICE O/P EST MOD 30 MIN: CPT | Mod: S$GLB,,, | Performed by: INTERNAL MEDICINE

## 2017-07-27 PROCEDURE — 99499 UNLISTED E&M SERVICE: CPT | Mod: S$GLB,,, | Performed by: INTERNAL MEDICINE

## 2017-07-27 RX ORDER — AMOXICILLIN 500 MG/1
CAPSULE ORAL
COMMUNITY
Start: 2017-07-21 | End: 2017-08-18

## 2017-07-27 RX ORDER — DICYCLOMINE HYDROCHLORIDE 10 MG/1
10 CAPSULE ORAL 3 TIMES DAILY PRN
Qty: 60 CAPSULE | Refills: 3 | Status: SHIPPED | OUTPATIENT
Start: 2017-07-27 | End: 2018-06-22

## 2017-07-27 NOTE — PROGRESS NOTES
CHIEF COMPLAINT: follow up abdominal pain, neck pain, hypertension, back pain.      HISTORY OF PRESENT ILLNESS: This is a 77-year-old woman who presents for follow up of above. Her right neck is better. She takes tizanidine at bedtime as needed.     She has had some heartburn. She is taking pantoprazole 40 mg daily and ranitidine 150 mg daily which helps.  She continues to have intermittent abdominal cramping that is followed by diarrhea.  Symptoms occur 1-2 times a month.      She continues to have sinus congestion with clear drainage.  She continues to take loratadine 10 mg daily and flonase daily.     She saw Dr Le 1/31/17 and she has [facility]. Her back is doing a little better. She is now walking without a cane. She has occasional left hip pain. She is taking gabapentin 100 mg 1 capsule three times daily and 3 at bedtime. Dose has been increased due to foot pain. Foot pain is better on higher dose of gabapentin. She takes oxycodone apap 5/325 one tablet up to 5 times daily which keeps her back pain under control. She has to take oxycodone apap when she is active and does things. If she does not go out, she does not have have to take as much oxycodone apap.        She continues to take amlodipine 10 mg daily, aspirin 81 mg daily, coreg 25 mg twice daily and lasix 40 mg twice daily, and KCL 10 meq once daily for her diastolic dysfunction and hypertension. She denies lightheadedness. No chest pain or shortness of breath. She is now taking atorvastatin 40 mg once daily for her cholesterol.     She is taking lantus 56 units at night and Novolog 23 units with meals. Blood sugars have been fine - 100 - 110. No hypoglycemia.       PAST MEDICAL HISTORY:   1. Diabetes mellitus   2. Hypertension   3. Hyperlipidemia   4. Left heart catheterization January 2007 which revealed luminal irregularities in the LAD, left circumflex and right coronary artery. She had diastolic dysfunction  "and patent renal arteries.   5. Sleep apnea   6. Obesity.   7. Nephrolithiasis status post lithotripsy.   8. Reflux - had nonerosive gastropathy on EGD September 2007. Gastritis on EGD 9/2010  9. Hidradenitis suppurativa.   10. History of diverticulitis with a hospitalized October 2007.   11. Migraines   12. Obesity.   13. 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    14. Total hysterectomy in 1969.   15. Cholecystectomy was done at that time as well.   16. 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   17. Colon polyps on colonscopy 11/2010 - due 2013   18. Hospitalization 12/10 for e coli sepsis due to left ureteral stone with left nephrostomy tube in [place], [place]   19. Left knee replacement 9/2012      MEDICATIONS and ALLERGIES: Updated on EPIC.     PHYSICAL EXAMINATION:    /70 (BP Location: Right arm, Patient Position: Sitting)   Pulse 71   Ht 5' 8" (1.727 m)   Wt 104.4 kg (230 lb 3.2 oz)   SpO2 98%   BMI 35.00 kg/m²      GENERAL: She is alert, oriented, no apparent distress. Affect within   normal limits.  Conjunctiva anicteric. . Oropharynx clear.   NECK: Supple.   Respiratory: Effort normal. Lungs clear  HEART: Regular rate and rhythm without murmurs, gallops or rubs.   No lower extremity edema.     ABDOMEN: soft, non distended,  bowel sounds present, no hepatosplenomgaly. Left lower quadrant tenderness. No rebound or guarding        ASSESSMENT AND PLAN:   1. Abdominal spasm - Bentyl 10 mg one tablet at onset of abdominal cramping.  2. Gerd -  increase pantoprazole to 40 mg twice daily and add ranitidine 300 mg nightly  3. Diabetes- watch sugars closely  4. Hypertension - stable.    5. Hyperlipidemia - on lipitor  6. Allergic rhinitis - stable  7. Diastolic congestive heart failure - stable.    8. CRI -stable  9. Obesity - discussed diet, exercise and weight loss  10. CAD -risk factor modification  11. Aortic atherosclerosis " - risk factor modification  12. Tortuous aorta - HTN controlled  13. Colon polyps - Colonoscopy in 8/2013 - 2 polyps. Flex sig 11/2013 - mild granular mucosa. Recommended colonoscopy in 8/2018. Attempted colonoscopy 11/2016 du to diarrhea which has resolved. She had poor prep with hyperplastic rectal biopsy.   MMG 10/16  Prevnar 10/15 and flu shot 11/16  I will see her back as scheudled, sooner if problems

## 2017-08-08 ENCOUNTER — OFFICE VISIT (OUTPATIENT)
Dept: NEPHROLOGY | Facility: CLINIC | Age: 77
End: 2017-08-08
Payer: MEDICARE

## 2017-08-08 VITALS
HEART RATE: 72 BPM | BODY MASS INDEX: 34.41 KG/M2 | OXYGEN SATURATION: 97 % | SYSTOLIC BLOOD PRESSURE: 170 MMHG | WEIGHT: 227.06 LBS | DIASTOLIC BLOOD PRESSURE: 90 MMHG | HEIGHT: 68 IN

## 2017-08-08 DIAGNOSIS — I10 ESSENTIAL HYPERTENSION: Chronic | ICD-10-CM

## 2017-08-08 DIAGNOSIS — N20.0 NEPHROLITHIASIS: Primary | ICD-10-CM

## 2017-08-08 PROCEDURE — 99499 UNLISTED E&M SERVICE: CPT | Mod: S$PBB,,, | Performed by: INTERNAL MEDICINE

## 2017-08-08 PROCEDURE — 99213 OFFICE O/P EST LOW 20 MIN: CPT | Mod: S$GLB,,, | Performed by: INTERNAL MEDICINE

## 2017-08-08 PROCEDURE — 99999 PR PBB SHADOW E&M-EST. PATIENT-LVL III: CPT | Mod: PBBFAC,,, | Performed by: INTERNAL MEDICINE

## 2017-08-08 PROCEDURE — 1159F MED LIST DOCD IN RCRD: CPT | Mod: S$GLB,,, | Performed by: INTERNAL MEDICINE

## 2017-08-08 PROCEDURE — 3077F SYST BP >= 140 MM HG: CPT | Mod: S$GLB,,, | Performed by: INTERNAL MEDICINE

## 2017-08-08 PROCEDURE — 1126F AMNT PAIN NOTED NONE PRSNT: CPT | Mod: S$GLB,,, | Performed by: INTERNAL MEDICINE

## 2017-08-08 PROCEDURE — 3080F DIAST BP >= 90 MM HG: CPT | Mod: S$GLB,,, | Performed by: INTERNAL MEDICINE

## 2017-08-08 NOTE — PROGRESS NOTES
Subjective:       Patient ID: Henrietta Villasenor is a 77 y.o. Black or  female who presents for a follow up evaluation of No chief complaint on file.  The patient has a history of Type 2 diabetes in x 25 years, Hyperlipidemia, CAD (MI in the past), HTN, MARLON. The patient was send here for CKD and nephrolithiasis. s/p lithotrypsi in 2011 bilateral with ureteroscopy - also with a nephrostomy bag in 09, last attack right after Mitzi?. Had an episode of HANNAH in 2009. Ct 2/15 compared to CT 8/15 showed an increase in the size of stones.   The patient's sister had ESRD from diabetes,  no history of kidney stones. The patient does not freuqently use NSAIDS or herbal supplements.   The patient unfortunately is a very bad historian and most of her history is from the chart. The patient is taking furosemide twice a day for 6-7 yrs.   No recent symptoms from her kidney stones. Today she complains about uncontrolled. Patient back on furosemide twice daily for her blood pressure.     HPI  Review of Systems   Constitutional: Negative for activity change, appetite change, chills, fatigue, fever and unexpected weight change.   HENT: Negative.  Negative for nosebleeds.    Eyes: Negative.    Respiratory: Negative.  Negative for cough, chest tightness and shortness of breath.    Cardiovascular: Negative.  Negative for chest pain and leg swelling.   Gastrointestinal: Positive for abdominal pain, constipation and diarrhea. Negative for abdominal distention, anal bleeding, nausea and vomiting.   Genitourinary: Negative for decreased urine volume, difficulty urinating, dysuria, flank pain, frequency, hematuria, pelvic pain, urgency and vaginal bleeding.   Musculoskeletal: Positive for back pain. Negative for joint swelling and myalgias.        Knee replacement   Skin: Negative.  Negative for rash.   Neurological: Negative.    Psychiatric/Behavioral: Negative.    All other systems reviewed and are negative.      Objective:       Physical Exam   Constitutional: She is oriented to person, place, and time. She appears well-developed and well-nourished. No distress.   obese   HENT:   Head: Normocephalic and atraumatic.   Right Ear: External ear normal.   Left Ear: External ear normal.   Nose: Nose normal.   Mouth/Throat: Oropharynx is clear and moist.   Eyes: Conjunctivae and EOM are normal. Pupils are equal, round, and reactive to light.   Neck: Normal range of motion. Neck supple. No thyromegaly present.   Cardiovascular: Normal rate, regular rhythm, normal heart sounds and intact distal pulses.    Pulmonary/Chest: Effort normal and breath sounds normal. No respiratory distress. She has no wheezes. She has no rales. She exhibits no tenderness.   Abdominal: Soft. Normal appearance and bowel sounds are normal. She exhibits no distension. There is tenderness (mild LLQ). There is no rebound and no guarding.   Musculoskeletal: Normal range of motion. She exhibits no edema or tenderness.   Neurological: She is alert and oriented to person, place, and time. She has normal reflexes.   Skin: Skin is warm, dry and intact. She is not diaphoretic.   Psychiatric: She has a normal mood and affect. Her behavior is normal. Judgment and thought content normal.   Nursing note and vitals reviewed.      Assessment:       1. Nephrolithiasis    2. Essential hypertension        Plan:       1. CKD2 and nephrolithiasis. Likely multifactorial (vascular disease from DM/HTN)   Nonobstructing right nephrolithiasis and evidence of chronic kidney disease in an renal US in 2011. 24 hour urine only shows low citrate (was normal in last stone profile). Normal urinary calcium (dispite the lasix). Last serum , serum uric acid 6.  SPEP/HAZEL were negative for monoclonal spikes, UA and protein/creatinine ratio were negative.   No recent symptoms from her kidney stone  - ultrasound for stone surveillance.       - Sufficient fluid intake distributed throughout the day to  produce at least 2 liters of urine per day, including drinking at night   - Avoiding excessive animal protein in the diet.   - Limiting dietary sodium to 100 meq/day.    - Increasing dietary potassium intake   - Limiting dietary sucrose and fructose.  - Limiting dietary oxalate and vitamin C.       Patient reports swelling and itching with lisinopril.     Lab Results   Component Value Date    CREATININE 0.9 07/01/2017      will repeat  Acid-Base:   Lab Results   Component Value Date     07/01/2017    K 3.4 (L) 07/01/2017    CO2 35 (H) 07/01/2017     2. HTN: Blood pressures not well controlled today in clinic. Will send her for lashon and renin as she has evidence of alkalosis and hypokalemia (on diuretics though).  Will start her on spironolactone as next medication for her BP.    3. Renal osteodystrophy: last PTH elevated: will repeat.  Lab Results   Component Value Date    .0 (H) 02/24/2016    CALCIUM 10.0 07/01/2017    PHOS 2.2 (L) 12/02/2016       4. Anemia: wnl  Lab Results   Component Value Date    HGB 12.3 07/01/2017        5. DM:  Last HbA1C: not well controlled.   Lab Results   Component Value Date    HGBA1C 8.4 (H) 05/17/2017       6. Lipid management: not on a statin secondary to leg cramps, now well controlled - will defer therapy to primary care.   Lab Results   Component Value Date    LDLCALC 122.2 11/21/2016       Follow up in 5 month with labs and renal US

## 2017-08-18 ENCOUNTER — LAB VISIT (OUTPATIENT)
Dept: LAB | Facility: HOSPITAL | Age: 77
End: 2017-08-18
Attending: INTERNAL MEDICINE
Payer: MEDICARE

## 2017-08-18 ENCOUNTER — OFFICE VISIT (OUTPATIENT)
Dept: INTERNAL MEDICINE | Facility: CLINIC | Age: 77
End: 2017-08-18
Payer: MEDICARE

## 2017-08-18 VITALS
HEIGHT: 68 IN | WEIGHT: 224.63 LBS | HEART RATE: 75 BPM | SYSTOLIC BLOOD PRESSURE: 110 MMHG | BODY MASS INDEX: 34.04 KG/M2 | DIASTOLIC BLOOD PRESSURE: 60 MMHG

## 2017-08-18 DIAGNOSIS — Z78.0 ASYMPTOMATIC POSTMENOPAUSAL STATE: ICD-10-CM

## 2017-08-18 DIAGNOSIS — E11.42 DIABETIC POLYNEUROPATHY ASSOCIATED WITH TYPE 2 DIABETES MELLITUS: ICD-10-CM

## 2017-08-18 DIAGNOSIS — Z79.4 TYPE 2 DIABETES MELLITUS WITH DIABETIC POLYNEUROPATHY, WITH LONG-TERM CURRENT USE OF INSULIN: ICD-10-CM

## 2017-08-18 DIAGNOSIS — Z11.4 ENCOUNTER FOR SCREENING FOR HIV: ICD-10-CM

## 2017-08-18 DIAGNOSIS — E13.9 DIABETES MELLITUS DUE TO ABNORMAL INSULIN: ICD-10-CM

## 2017-08-18 DIAGNOSIS — I10 ESSENTIAL HYPERTENSION: Primary | Chronic | ICD-10-CM

## 2017-08-18 DIAGNOSIS — Z79.4 TYPE 2 DIABETES MELLITUS WITH STAGE 2 CHRONIC KIDNEY DISEASE, WITH LONG-TERM CURRENT USE OF INSULIN: ICD-10-CM

## 2017-08-18 DIAGNOSIS — I70.0 AORTIC ATHEROSCLEROSIS: ICD-10-CM

## 2017-08-18 DIAGNOSIS — M46.06 SPINAL ENTHESOPATHY OF LUMBAR REGION: ICD-10-CM

## 2017-08-18 DIAGNOSIS — I25.119 CORONARY ARTERY DISEASE INVOLVING NATIVE CORONARY ARTERY OF NATIVE HEART WITH ANGINA PECTORIS: ICD-10-CM

## 2017-08-18 DIAGNOSIS — E66.9 OBESITY (BMI 30.0-34.9): ICD-10-CM

## 2017-08-18 DIAGNOSIS — R06.02 SHORTNESS OF BREATH: ICD-10-CM

## 2017-08-18 DIAGNOSIS — D64.9 ANEMIA, UNSPECIFIED TYPE: ICD-10-CM

## 2017-08-18 DIAGNOSIS — G47.33 OSA ON CPAP: Chronic | ICD-10-CM

## 2017-08-18 DIAGNOSIS — I50.30 DIASTOLIC CONGESTIVE HEART FAILURE, UNSPECIFIED CONGESTIVE HEART FAILURE CHRONICITY: ICD-10-CM

## 2017-08-18 DIAGNOSIS — E66.9 OBESITY, DIABETES, AND HYPERTENSION SYNDROME: ICD-10-CM

## 2017-08-18 DIAGNOSIS — E78.5 HYPERLIPIDEMIA, UNSPECIFIED HYPERLIPIDEMIA TYPE: ICD-10-CM

## 2017-08-18 DIAGNOSIS — K21.9 GASTROESOPHAGEAL REFLUX DISEASE WITHOUT ESOPHAGITIS: ICD-10-CM

## 2017-08-18 DIAGNOSIS — N18.2 TYPE 2 DIABETES MELLITUS WITH STAGE 2 CHRONIC KIDNEY DISEASE, WITH LONG-TERM CURRENT USE OF INSULIN: ICD-10-CM

## 2017-08-18 DIAGNOSIS — E11.59 OBESITY, DIABETES, AND HYPERTENSION SYNDROME: ICD-10-CM

## 2017-08-18 DIAGNOSIS — E11.69 OBESITY, DIABETES, AND HYPERTENSION SYNDROME: ICD-10-CM

## 2017-08-18 DIAGNOSIS — E11.42 TYPE 2 DIABETES MELLITUS WITH DIABETIC POLYNEUROPATHY, WITH LONG-TERM CURRENT USE OF INSULIN: ICD-10-CM

## 2017-08-18 DIAGNOSIS — N18.2 STAGE 2 CHRONIC KIDNEY DISEASE: ICD-10-CM

## 2017-08-18 DIAGNOSIS — I10 ESSENTIAL HYPERTENSION: Chronic | ICD-10-CM

## 2017-08-18 DIAGNOSIS — I15.2 OBESITY, DIABETES, AND HYPERTENSION SYNDROME: ICD-10-CM

## 2017-08-18 DIAGNOSIS — E11.22 TYPE 2 DIABETES MELLITUS WITH STAGE 2 CHRONIC KIDNEY DISEASE, WITH LONG-TERM CURRENT USE OF INSULIN: ICD-10-CM

## 2017-08-18 DIAGNOSIS — Z00.00 ENCOUNTER FOR PREVENTIVE HEALTH EXAMINATION: Primary | ICD-10-CM

## 2017-08-18 DIAGNOSIS — E21.3 HYPERPARATHYROIDISM: ICD-10-CM

## 2017-08-18 DIAGNOSIS — K57.30 DIVERTICULOSIS OF LARGE INTESTINE WITHOUT HEMORRHAGE: ICD-10-CM

## 2017-08-18 LAB
ALBUMIN SERPL BCP-MCNC: 3.3 G/DL
ALP SERPL-CCNC: 91 U/L
ALT SERPL W/O P-5'-P-CCNC: 11 U/L
ANION GAP SERPL CALC-SCNC: 10 MMOL/L
AST SERPL-CCNC: 16 U/L
BASOPHILS # BLD AUTO: 0.03 K/UL
BASOPHILS NFR BLD: 0.3 %
BILIRUB SERPL-MCNC: 0.4 MG/DL
BUN SERPL-MCNC: 11 MG/DL
CALCIUM SERPL-MCNC: 9.3 MG/DL
CHLORIDE SERPL-SCNC: 102 MMOL/L
CHOLEST/HDLC SERPL: 3.8 {RATIO}
CO2 SERPL-SCNC: 32 MMOL/L
CREAT SERPL-MCNC: 1 MG/DL
DIFFERENTIAL METHOD: ABNORMAL
EOSINOPHIL # BLD AUTO: 0.3 K/UL
EOSINOPHIL NFR BLD: 2.8 %
ERYTHROCYTE [DISTWIDTH] IN BLOOD BY AUTOMATED COUNT: 14.7 %
EST. GFR  (AFRICAN AMERICAN): >60 ML/MIN/1.73 M^2
EST. GFR  (NON AFRICAN AMERICAN): 54 ML/MIN/1.73 M^2
ESTIMATED AVG GLUCOSE: 163 MG/DL
ESTIMATED AVG GLUCOSE: 163 MG/DL
GLUCOSE SERPL-MCNC: 108 MG/DL (ref 70–110)
GLUCOSE SERPL-MCNC: 118 MG/DL
HBA1C MFR BLD HPLC: 7.3 %
HBA1C MFR BLD HPLC: 7.3 %
HCT VFR BLD AUTO: 38.2 %
HDL/CHOLESTEROL RATIO: 26.1 %
HDLC SERPL-MCNC: 176 MG/DL
HDLC SERPL-MCNC: 46 MG/DL
HGB BLD-MCNC: 12 G/DL
LDLC SERPL CALC-MCNC: 112.8 MG/DL
LYMPHOCYTES # BLD AUTO: 2.6 K/UL
LYMPHOCYTES NFR BLD: 24.5 %
MCH RBC QN AUTO: 27.2 PG
MCHC RBC AUTO-ENTMCNC: 31.4 G/DL
MCV RBC AUTO: 87 FL
MONOCYTES # BLD AUTO: 1 K/UL
MONOCYTES NFR BLD: 9.7 %
NEUTROPHILS # BLD AUTO: 6.5 K/UL
NEUTROPHILS NFR BLD: 62.5 %
NONHDLC SERPL-MCNC: 130 MG/DL
PLATELET # BLD AUTO: 257 K/UL
PMV BLD AUTO: 10.1 FL
POTASSIUM SERPL-SCNC: 3.7 MMOL/L
PROT SERPL-MCNC: 7.4 G/DL
RBC # BLD AUTO: 4.41 M/UL
SODIUM SERPL-SCNC: 144 MMOL/L
TRIGL SERPL-MCNC: 86 MG/DL
WBC # BLD AUTO: 10.39 K/UL

## 2017-08-18 PROCEDURE — 3008F BODY MASS INDEX DOCD: CPT | Mod: S$GLB,,, | Performed by: INTERNAL MEDICINE

## 2017-08-18 PROCEDURE — 3078F DIAST BP <80 MM HG: CPT | Mod: S$GLB,,, | Performed by: INTERNAL MEDICINE

## 2017-08-18 PROCEDURE — 1159F MED LIST DOCD IN RCRD: CPT | Mod: S$GLB,,, | Performed by: INTERNAL MEDICINE

## 2017-08-18 PROCEDURE — 1125F AMNT PAIN NOTED PAIN PRSNT: CPT | Mod: S$GLB,,, | Performed by: INTERNAL MEDICINE

## 2017-08-18 PROCEDURE — 99499 UNLISTED E&M SERVICE: CPT | Mod: S$GLB,,, | Performed by: NURSE PRACTITIONER

## 2017-08-18 PROCEDURE — 99999 PR PBB SHADOW E&M-EST. PATIENT-LVL IV: CPT | Mod: PBBFAC,,, | Performed by: INTERNAL MEDICINE

## 2017-08-18 PROCEDURE — 3074F SYST BP LT 130 MM HG: CPT | Mod: S$GLB,,, | Performed by: INTERNAL MEDICINE

## 2017-08-18 PROCEDURE — 99999 PR PBB SHADOW E&M-EST. PATIENT-LVL III: CPT | Mod: PBBFAC,,, | Performed by: NURSE PRACTITIONER

## 2017-08-18 PROCEDURE — 82948 REAGENT STRIP/BLOOD GLUCOSE: CPT | Mod: S$GLB,,, | Performed by: NURSE PRACTITIONER

## 2017-08-18 PROCEDURE — 99499 UNLISTED E&M SERVICE: CPT | Mod: S$PBB,,, | Performed by: INTERNAL MEDICINE

## 2017-08-18 PROCEDURE — 99214 OFFICE O/P EST MOD 30 MIN: CPT | Mod: S$GLB,,, | Performed by: INTERNAL MEDICINE

## 2017-08-18 PROCEDURE — G0439 PPPS, SUBSEQ VISIT: HCPCS | Mod: S$GLB,,, | Performed by: NURSE PRACTITIONER

## 2017-08-18 RX ORDER — ALBUTEROL SULFATE 90 UG/1
2 AEROSOL, METERED RESPIRATORY (INHALATION) EVERY 4 HOURS PRN
Qty: 1 INHALER | Refills: 6 | Status: SHIPPED | OUTPATIENT
Start: 2017-08-18 | End: 2017-08-18 | Stop reason: SDUPTHER

## 2017-08-18 RX ORDER — INSULIN ASPART 100 [IU]/ML
INJECTION, SOLUTION INTRAVENOUS; SUBCUTANEOUS
Qty: 5 BOX | Refills: 11 | Status: SHIPPED | OUTPATIENT
Start: 2017-08-18 | End: 2017-09-20

## 2017-08-18 RX ORDER — ATORVASTATIN CALCIUM 40 MG/1
40 TABLET, FILM COATED ORAL DAILY
Qty: 90 TABLET | Refills: 3 | Status: SHIPPED | OUTPATIENT
Start: 2017-08-18 | End: 2018-09-19

## 2017-08-18 RX ORDER — FLUTICASONE PROPIONATE 50 MCG
SPRAY, SUSPENSION (ML) NASAL
Qty: 1 BOTTLE | Refills: 6 | Status: SHIPPED | OUTPATIENT
Start: 2017-08-18 | End: 2021-10-04 | Stop reason: SDUPTHER

## 2017-08-18 RX ORDER — OXYCODONE AND ACETAMINOPHEN 10; 325 MG/1; MG/1
TABLET ORAL
Qty: 150 TABLET | Refills: 0 | Status: SHIPPED | OUTPATIENT
Start: 2017-08-18 | End: 2017-10-18 | Stop reason: SDUPTHER

## 2017-08-18 RX ORDER — ALBUTEROL SULFATE 90 UG/1
2 AEROSOL, METERED RESPIRATORY (INHALATION) EVERY 4 HOURS PRN
Qty: 1 INHALER | Refills: 6 | Status: SHIPPED | OUTPATIENT
Start: 2017-08-18 | End: 2017-10-12 | Stop reason: SDUPTHER

## 2017-08-18 RX ORDER — OXYCODONE AND ACETAMINOPHEN 10; 325 MG/1; MG/1
TABLET ORAL
Qty: 150 TABLET | Refills: 0 | Status: SHIPPED | OUTPATIENT
Start: 2017-09-18 | End: 2017-10-18

## 2017-08-18 NOTE — Clinical Note
Peg Arriaga MD,  Ms. Henrietta Villasenor was seen today for an HRA visit.  At the visit a mini-cognitive assessment was performed and found to be abnormal.  I am bringing this to your attention so that further evaluation or follow-up can be done should you decide it is appropriate.   Thank you for allowing me to participate in the care of your patient.  Ava Salinas NP

## 2017-08-18 NOTE — PROGRESS NOTES
CHIEF COMPLAINT: follow up abdominal pain, neck pain, hypertension, back pain.      HISTORY OF PRESENT ILLNESS: This is a 77-year-old woman who presents for follow up of above. She brings all of her medication bottles with her today     Heartburn is a little better. She is taking pantoprazole 40 mg daily and ranitidine 300 mg at night.  She is taking dicyclomine 10 mg three times daily as needed which helps somewhat.   Symptoms occur 1-2 times a month.      She continues to have sinus congestion with clear drainage.  She continues to take loratadine 10 mg daily and flonase daily.     She saw Dr Le 1/31/17 and [facility]. Her back is doing a little better. She is now walking without a cane. She has occasional left hip pain. She is taking gabapentin 100 mg 1 capsule three times daily and 3 at bedtime. Dose has been increased due to foot pain. Foot pain is better on higher dose of gabapentin. She takes oxycodone apap 5/325 one tablet up to 5 times daily which keeps her back pain under control. She has to take oxycodone apap when she is active and does things. If she does not go out, she does not have have to take as much oxycodone apap.        She continues to take aspirin 81 mg daily, coreg 25 mg twice daily and lasix 40 mg twice daily for her diastolic dysfunction and hypertension. She denies lightheadedness. No chest pain or shortness of breath. She is now taking atorvastatin 40 mg once daily for her cholesterol. She is out of amlodipine     She is taking lantus 56 units at night and Humalog 23 units with meals. Blood sugars have been fine - 100 - 110. No hypoglycemia.       PAST MEDICAL HISTORY:   1. Diabetes mellitus   2. Hypertension   3. Hyperlipidemia   4. Left heart catheterization January 2007 which revealed luminal irregularities in the LAD, left circumflex and right coronary artery. She had diastolic dysfunction and patent renal arteries.   5. Sleep apnea   6.  "Obesity.   7. Nephrolithiasis status post lithotripsy.   8. Reflux - had nonerosive gastropathy on EGD September 2007. Gastritis on EGD 9/2010  9. Hidradenitis suppurativa.   10. History of diverticulitis with a hospitalized October 2007.   11. Migraines   12. Obesity.   13. 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    14. Total hysterectomy in 1969.   15. Cholecystectomy was done at that time as well.   16. 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   17. Colon polyps on colonscopy 11/2010 - due 2013   18. Hospitalization 12/10 for e coli sepsis due to left ureteral stone with left nephrostomy tube in [place], [place]   19. Left knee replacement 9/2012      MEDICATIONS and ALLERGIES: Updated on EPIC.     PHYSICAL EXAMINATION:     /60 (BP Location: Right arm, Patient Position: Sitting, BP Method: Medium (Manual))   Pulse 75   Ht 5' 8" (1.727 m)   Wt 101.9 kg (224 lb 10.4 oz)   BMI 34.16 kg/m²     GENERAL: She is alert, oriented, no apparent distress. Affect within   normal limits.  Conjunctiva anicteric. . Oropharynx clear.   NECK: Supple.   Respiratory: Effort normal. Lungs clear  HEART: Regular rate and rhythm without murmurs, gallops or rubs.   No lower extremity edema.     ABDOMEN: soft, non distended,  bowel sounds present, no hepatosplenomgaly. Left lower quadrant tenderness. No rebound or guarding        ASSESSMENT AND PLAN:   1. Abdominal spasm - continue Bentyl 10 mg one tablet at onset of abdominal cramping.  2. Gerd -  pantoprazole to 40 mg daily and  ranitidine 300 mg nightly  3. Diabetes- A1C today  4. Hypertension - stable.    5. Hyperlipidemia - on lipitor  6. Allergic rhinitis - stable  7. Diastolic congestive heart failure - stable.    8. CRI -labs  9. Obesity - discussed diet, exercise and weight loss  10. CAD -risk factor modification  11. Aortic atherosclerosis and possible mesenteric artery stenosis- risk factor " modification  12. Tortuous aorta - HTN controlled  13. Colon polyps - Colonoscopy in 8/2013 - 2 polyps. Flex sig 11/2013 - mild granular mucosa. Recommended colonoscopy in 8/2018. Attempted colonoscopy 11/2016 du to diarrhea which has resolved. She had poor prep with hyperplastic rectal biopsy.   MMG 10/16  Prevnar 10/15 and flu shot 11/16  I will see her back 2 months, sooner if problems

## 2017-08-18 NOTE — PATIENT INSTRUCTIONS
Counseling and Referral of Other Preventative  (Italic type indicates deductible and co-insurance are waived)    Patient Name: Henrietta Villasenor  Today's Date: 8/18/2017      SERVICE LIMITATIONS RECOMMENDATION    Vaccines    · Pneumococcal (once after 65)    · Influenza (annually)    · Hepatitis B (if medium/high risk)    · Prevnar 13      Hepatitis B medium/high risk factors:       - End-stage renal disease       - Hemophiliacs who received Factor VII or         IX concentrates       - Clients of institutions for the mentally             retarded       - Persons who live in the same house as          a HepB carrier       - Homosexual men       - Illicit injectable drug abusers     Pneumococcal: Done, no repeat necessary     Influenza: Due in September/October of this year     Hepatitis B: Done x1     Prevnar 13: Done, no repeat necessary    Mammogram (biennial age 50-74)  Annually (age 40 or over)  Last done 11/21/16, recommend to repeat every 1  year    Pap (up to age 70 and after 70 if unknown history or abnormal study last 10 years)    N/A     The USPSTF recommends against screening for cervical cancer in women who have had a hysterectomy with removal of the cervix and who do not have a history of a high-grade precancerous lesion (cervical intraepithelial neoplasia [JEVON] grade 2 or 3) or cervical cancer.     Colorectal cancer screening (to age 75)    · Fecal occult blood test (annual)  · Flexible sigmoidoscopy (5y)  · Screening colonoscopy (10y)  · Barium enema   Last done 06/14/17, recommend to repeat every 5  years    Diabetes self-management training (no USPSTF recommendations)  Requires referral by treating physician for patient with diabetes or renal disease. 10 hours of initial DSMT sessions of no less than 30 minutes each in a continuous 12-month period. 2 hours of follow-up DSMT in subsequent years.  Sees endocrine    Bone mass measurements (age 65 & older, biennial)  Requires diagnosis related to  osteoporosis or estrogen deficiency. Biennial benefit unless patient has history of long-term glucocorticoid Ordered    Glaucoma screening (no USPSTF recommendation)  Diabetes mellitus, family history   , age 50 or over    American, age 65 or over  Yearly eye exams recommended    Medical nutrition therapy for diabetes or renal disease (no recommended schedule)  Requires referral by treating physician for patient with diabetes or renal disease or kidney transplant within the past 3 years.  Can be provided in same year as diabetes self-management training (DSMT), and CMS recommends medical nutrition therapy take place after DSMT. Up to 3 hours for initial year and 2 hours in subsequent years.  N/A    Cardiovascular screening blood tests (every 5 years)  · Fasting lipid panel  Order as a panel if possible  Done this year, repeat every year    Diabetes screening tests (at least every 3 years, Medicare covers annually or at 6-month intervals for prediabetic patients)  · Fasting blood sugar (FBS) or glucose tolerance test (GTT)  Patient must be diagnosed with one of the following:       - Hypertension       - Dyslipidemia       - Obesity (BMI 30kg/m2)       - Previous elevated impaired FBS or GTT       ... or any two of the following:       - Overweight (BMI 25 but <30)       - Family history of diabetes       - Age 65 or older       - History of gestational diabetes or birth of baby weighing more than 9 pounds  Done this year, repeat every year    HIV screening (annually for increased risk patients)  · HIV-1 and HIV-2 by EIA, or ANUJA, rapid antibody test or oral mucosa transudate  Patients must be at increased risk for HIV infection per USPSTF guidelines or pregnant. Tests covered annually for patient at increased risk or as requested by the patient. Pregnant patients may receive up to 3 tests during pregnancy.  Risks discussed, screening is recommended    Smoking cessation counseling (up to 8  sessions per year)  Patients must be asymptomatic of tobacco-related conditions to receive as a preventative service.  Non-smoker    Subsequent annual wellness visit  At least 12 months since last AWV  Return in one year     The following information is provided to all patients.  This information is to help you find resources for any of the problems found today that may be affecting your health:                Living healthy guide: www.[facility].[place].[facility]      Understanding Diabetes: www.diabetes.org      Eating healthy: www.cdc.gov/healthyweight      CDC home safety checklist: www.cdc.gov/steadi/patient.html      Agency on Aging: www.goea.[place].[facility]      Alcoholics anonymous (AA): www.aa.org      Physical Activity: www.felix.nih.gov/hj0cesr      Tobacco use: www.quitwithusla.org       Call sleep medicine to discuss getting new equipment for cpap: [phone]  Call to set up bone density screening: [phone]

## 2017-08-20 VITALS — SYSTOLIC BLOOD PRESSURE: 104 MMHG | DIASTOLIC BLOOD PRESSURE: 64 MMHG

## 2017-08-20 PROBLEM — E11.42 TYPE 2 DIABETES MELLITUS WITH DIABETIC POLYNEUROPATHY, WITH LONG-TERM CURRENT USE OF INSULIN: Status: ACTIVE | Noted: 2017-08-20

## 2017-08-20 PROBLEM — I15.2 OBESITY, DIABETES, AND HYPERTENSION SYNDROME: Status: ACTIVE | Noted: 2017-08-20

## 2017-08-20 PROBLEM — E21.3 HYPERPARATHYROIDISM: Status: ACTIVE | Noted: 2017-08-20

## 2017-08-20 PROBLEM — E66.9 OBESITY, DIABETES, AND HYPERTENSION SYNDROME: Status: ACTIVE | Noted: 2017-08-20

## 2017-08-20 PROBLEM — E11.69 OBESITY, DIABETES, AND HYPERTENSION SYNDROME: Status: ACTIVE | Noted: 2017-08-20

## 2017-08-20 PROBLEM — E11.59 OBESITY, DIABETES, AND HYPERTENSION SYNDROME: Status: ACTIVE | Noted: 2017-08-20

## 2017-08-20 PROBLEM — Z79.4 TYPE 2 DIABETES MELLITUS WITH DIABETIC POLYNEUROPATHY, WITH LONG-TERM CURRENT USE OF INSULIN: Status: ACTIVE | Noted: 2017-08-20

## 2017-08-21 NOTE — PROGRESS NOTES
Henrietta Villasenor presented for a  Medicare AWV and comprehensive Health Risk Assessment today. The following components were reviewed and updated:    · Medical history  · Family History  · Social history  · Allergies and Current Medications  · Health Risk Assessment  · Health Maintenance  · Care Team     ** See Completed Assessments for Annual Wellness Visit within the encounter summary.**       The following assessments were completed:  · Living Situation  · CAGE  · Depression Screening  · Timed Get Up and Go  · Whisper Test  · Cognitive Function Screening  · Nutrition Screening  · ADL Screening  · PAQ Screening            Vitals:    08/18/17 0948   BP: 104/64   BP Location: Right arm   Patient Position: Sitting     There is no height or weight on file to calculate BMI.  Physical Exam   Constitutional: She is oriented to person, place, and time. She appears well-developed and well-nourished. No distress.   HENT:   Head: Normocephalic and atraumatic.   Decreased movement of left side of face   Eyes: No scleral icterus.   Cardiovascular: Normal rate, regular rhythm, normal heart sounds and intact distal pulses.    Pulmonary/Chest: Effort normal and breath sounds normal. No respiratory distress.   Musculoskeletal: She exhibits no edema.   Neurological: She is alert and oriented to person, place, and time.   Skin: Skin is warm and dry. She is not diaphoretic.   Psychiatric: She has a normal mood and affect.   Sleepy at visit   Vitals reviewed.        Diagnoses and health risks identified today and associated recommendations/orders:    1. Encounter for preventive health examination  Assessments completed  Preventative health recommendations reviewed  Cognitive screening abnormal: will notify pcp for further evaluation/monitoring should it be deemed appropriate  Get up and go screening abnormal: fall prevention strategies discussed with patient    2. Insulin dependent diabetes mellitus  Stable.   Controlled with current  medical therapy  Followed by endocrine and diabetes empowerment.     3. Type 2 diabetes mellitus with diabetic polyneuropathy, with long-term current use of insulin  Stable.   Controlled with current medical therapy  Followed by endocrine and diabetes empowerment.     4. Diabetic polyneuropathy associated with type 2 diabetes mellitus  Stable.   Controlled with current medical therapy  Followed by endocrine and podiatry.     5. Type 2 diabetes mellitus with stage 2 chronic kidney disease, with long-term current use of insulin  Stable.   Controlled with current medical therapy  Followed by endocrine and diabetes empowerment    Patient feeling sleepy and mildly dizzy at today's visit.  BS checked. 108 at today's visit    - POCT glucose    6. Aortic atherosclerosis  Stable. Seen on cxr from 09/03/15  Controlled with current medical therapy  Followed by PCP.     7. Coronary artery disease involving native coronary artery of native heart with angina pectoris  Stable.   Controlled with current medical therapy  Followed by cardiology and PCP.     8. Diastolic congestive heart failure, unspecified congestive heart failure chronicity  Stable.   Controlled with current medical therapy  Followed by cardiology and PCP.     9. Hyperparathyroidism  Stable.   Followed by nephrology.     10. Spinal enthesopathy of lumbar region  Stable.   Controlled with current medical therapy  Followed by spine services.     11. Obesity, diabetes, and hypertension syndrome  Stable.   Controlled with current medical therapy  Followed by PCP.     12. MARLON on CPAP  Patient needs new parts for her cpap machine. Patient did not sleep well last night and is tired during the day.      - Ambulatory consult to Sleep Disorders    13. Asymptomatic postmenopausal state  - DXA Bone Density Spine And Hip; Future    14. Encounter for screening for HIV  - HIV-1 and HIV-2 antibodies; Future    15. Essential hypertension  Stable.   Controlled with current medical  therapy  Followed by PCP.     16. Hyperlipidemia, unspecified hyperlipidemia type  Stable.   Controlled with current medical therapy  Followed by PCP.     17. Stage 2 chronic kidney disease  Stable.   Followed by nephrology.     18. Obesity (BMI 30.0-34.9)  Stable.   Exercise and nutrition discussed  Followed by PCP.     19. Anemia, unspecified type  Stable.   Followed by PCP.     20. Diverticulosis of large intestine without hemorrhage  Stable.   Followed by GI.     21. Gastroesophageal reflux disease without esophagitis  Stable.   Controlled with current medical therapy  Followed by GI and PCP.         Provided Henrietta with a 5-10 year written screening schedule and personal prevention plan. Recommendations were developed using the USPSTF age appropriate recommendations. Education, counseling, and referrals were provided as needed. After Visit Summary printed and given to patient which includes a list of additional screenings\tests needed.    Return in 2 months (on 10/20/2017) for routine visit with your primary care provider or sooner if problems arise. .    Ava Salinas NP

## 2017-08-22 ENCOUNTER — TELEPHONE (OUTPATIENT)
Dept: INTERNAL MEDICINE | Facility: CLINIC | Age: 77
End: 2017-08-22

## 2017-08-22 NOTE — TELEPHONE ENCOUNTER
----- Message from Jeannine Arriaga MD sent at 8/20/2017  4:30 PM CDT -----  Please notify pt  Your blood count, blood sugar, kidney function, liver function, cholesterol are fine  Blood sugar is averaging 163 (A1C is 7.3) which is fine  SF

## 2017-09-19 NOTE — PROGRESS NOTES
"CC: Management of T2DM and review of chronic medical conditions as listed in the visit diagnosis.      HPI: Ms. Henrietta Villasenor is a 77 y.o. Black or  female who was diagnosed with Type 2 DM in the late 1990s. Started on orals, but has been on insulin since 2000. No hospitalizations for DM.  Previous [facility] patient.     A1C has trended down since last visit.     Takes insulin 4x/day and denies missing doses    Monitors BG 4x/day  AM:  with two readings of 234 and 282  Lunch: 117-202  Dinner:     Hyperglycemic readings are postprandial.     Doesn't snack between meals. States she is changing her eating habits and not snacking as much.     Times prandial insulin 5-10 minutes before meals.     No hypoglycemia.     No current exercise.     CURRENT DIABETIC MEDS: Lantus 60 units in the evening; Humalog 23-26 units with each meal   Uses Vials or Pens: Pens  Type of Glucose Meter: Accu-Chek Rosita    Last Eye Exam: December 2016  Last Podiatry Exam: April 2017    Last DM Education Attended: Previous empowerment patient (last seen January 2016)    REVIEW OF SYSTEMS  General: no weakness or fatigue; stable weight.   Eyes: (+) chronic blurry vision at night; no eye pain; (+) bilateral cataracts (has not had surgery)   Cardiac: no chest pain or palpitations.   Respiratory: no cough; occasional dyspnea.   GI: (+) chronic abdominal pain; + nausea, but it has improved (has been evaluated for abdominal pain and had studies done for this)  Skin: no rashes or insulin injection site reactions.   Neuro: reports chronic sharp shooting pains in feet; right side of mouth with chronic drooping  Endocrine: no polyuria, polydipsia, polyphagia.     Vital Signs  BP (!) 142/84   Pulse 62   Resp 18   Ht 5' 8" (1.727 m)   Wt 103.1 kg (227 lb 3.2 oz)   BMI 34.55 kg/m²     Hemoglobin A1C   Date Value Ref Range Status   08/18/2017 7.3 (H) 4.0 - 5.6 % Final     Comment:     According to ADA guidelines, " hemoglobin A1c <7.0% represents  optimal control in non-pregnant diabetic patients. Different  metrics may apply to specific patient populations.   Standards of Medical Care in Diabetes-2016.  For the purpose of screening for the presence of diabetes:  <5.7%     Consistent with the absence of diabetes  5.7-6.4%  Consistent with increasing risk for diabetes   (prediabetes)  >or=6.5%  Consistent with diabetes  Currently, no consensus exists for use of hemoglobin A1c  for diagnosis of diabetes for children.  This Hemoglobin A1c assay has significant interference with fetal   hemoglobin   (HbF). The results are invalid for patients with abnormal amounts of   HbF,   including those with known Hereditary Persistence   of Fetal Hemoglobin. Heterozygous hemoglobin variants (HbAS, HbAC,   HbAD, HbAE, HbA2) do not significantly interfere with this assay;   however, presence of multiple variants in a sample may impact the %   interference.     08/18/2017 7.3 (H) 4.0 - 5.6 % Final     Comment:     According to ADA guidelines, hemoglobin A1c <7.0% represents  optimal control in non-pregnant diabetic patients. Different  metrics may apply to specific patient populations.   Standards of Medical Care in Diabetes-2016.  For the purpose of screening for the presence of diabetes:  <5.7%     Consistent with the absence of diabetes  5.7-6.4%  Consistent with increasing risk for diabetes   (prediabetes)  >or=6.5%  Consistent with diabetes  Currently, no consensus exists for use of hemoglobin A1c  for diagnosis of diabetes for children.  This Hemoglobin A1c assay has significant interference with fetal   hemoglobin   (HbF). The results are invalid for patients with abnormal amounts of   HbF,   including those with known Hereditary Persistence   of Fetal Hemoglobin. Heterozygous hemoglobin variants (HbAS, HbAC,   HbAD, HbAE, HbA2) do not significantly interfere with this assay;   however, presence of multiple variants in a sample may  impact the %   interference.     05/17/2017 8.4 (H) 4.5 - 6.2 % Final     Comment:     According to ADA guidelines, hemoglobin A1C <7.0% represents  optimal control in non-pregnant diabetic patients.  Different  metrics may apply to specific populations.   Standards of Medical Care in Diabetes - 2016.  For the purpose of screening for the presence of diabetes:  <5.7%     Consistent with the absence of diabetes  5.7-6.4%  Consistent with increasing risk for diabetes   (prediabetes)  >or=6.5%  Consistent with diabetes  Currently no consensus exists for use of hemoglobin A1C  for diagnosis of diabetes for children.         Chemistry        Component Value Date/Time     08/18/2017 0930    K 3.7 08/18/2017 0930     08/18/2017 0930    CO2 32 (H) 08/18/2017 0930    BUN 11 08/18/2017 0930    CREATININE 1.0 08/18/2017 0930     (H) 08/18/2017 0930        Component Value Date/Time    CALCIUM 9.3 08/18/2017 0930    ALKPHOS 91 08/18/2017 0930    AST 16 08/18/2017 0930    ALT 11 08/18/2017 0930    BILITOT 0.4 08/18/2017 0930          Lab Results   Component Value Date    CHOL 176 08/18/2017    CHOL 197 11/21/2016    CHOL 263 (H) 12/31/2015     Lab Results   Component Value Date    HDL 46 08/18/2017    HDL 56 11/21/2016    HDL 46 12/31/2015     Lab Results   Component Value Date    LDLCALC 112.8 08/18/2017    LDLCALC 122.2 11/21/2016    LDLCALC 189.4 (H) 12/31/2015     Lab Results   Component Value Date    TRIG 86 08/18/2017    TRIG 94 11/21/2016    TRIG 138 12/31/2015     Lab Results   Component Value Date    CHOLHDL 26.1 08/18/2017    CHOLHDL 28.4 11/21/2016    CHOLHDL 17.5 (L) 12/31/2015       Lab Results   Component Value Date    TSH 1.596 05/17/2017     PHYSICAL EXAMINATION  Constitutional: Appears well, no distress  Neck: Supple, trachea midline.   Respiratory: CTA without wheezes, even and unlabored.  Cardiovascular: RRR; no carotid bruits or murmur.   Lymph: DP pulses  2+ bilaterally; no edema.   Skin:  warm and dry; no injection site reactions; (+) acanthosis nigracans observed.  Neuro:patient alert and cooperative; steady gait with use of walking cane.  Feet: see foot exam from previous clinic encounter with me; appropriate footwear.     Assessment/Plan  Diabetic peripheral neuropathy associated with type 2 diabetes mellitus:   A1C uncontrolled, but trending down  Switch Humalog to Novolog secondary to insurance formulary change  Continue current doses and monitoring BG 4x/day  On Cymbalta for neuropathy    Type 2 diabetes mellitus with stage 3 chronic kidney disease  Avoid hypoglycemia  Continue to f/u with Nephrology--next visit will include labs and renal us     Essential hypertension  Stable  Continue current medications  Management per Nephrology    Hyperlipidemia  Stable, improved  Continue Atorvastatin    Obesity (BMI 30-39.9)  Body mass index is 34.55 kg/m².   Can worsen insulin resistance, but weight loss can help this  Weight loss encouraged     Coronary artery disease involving native coronary artery of native heart with angina pectoris  Managed by Cardiology.     MARLON on c-pap  Continue C-pap and adherence to therapy has been encouraged as well     FOLLOW UP  Return in about 3 months (around 12/20/2017).

## 2017-09-20 ENCOUNTER — OFFICE VISIT (OUTPATIENT)
Dept: ENDOCRINOLOGY | Facility: CLINIC | Age: 77
End: 2017-09-20
Payer: MEDICARE

## 2017-09-20 VITALS
WEIGHT: 227.19 LBS | SYSTOLIC BLOOD PRESSURE: 142 MMHG | BODY MASS INDEX: 34.43 KG/M2 | DIASTOLIC BLOOD PRESSURE: 84 MMHG | HEART RATE: 62 BPM | RESPIRATION RATE: 18 BRPM | HEIGHT: 68 IN

## 2017-09-20 DIAGNOSIS — E11.22 TYPE 2 DIABETES MELLITUS WITH STAGE 3 CHRONIC KIDNEY DISEASE, WITH LONG-TERM CURRENT USE OF INSULIN: ICD-10-CM

## 2017-09-20 DIAGNOSIS — I25.119 CORONARY ARTERY DISEASE INVOLVING NATIVE CORONARY ARTERY OF NATIVE HEART WITH ANGINA PECTORIS: ICD-10-CM

## 2017-09-20 DIAGNOSIS — Z79.4 TYPE 2 DIABETES MELLITUS WITH STAGE 3 CHRONIC KIDNEY DISEASE, WITH LONG-TERM CURRENT USE OF INSULIN: ICD-10-CM

## 2017-09-20 DIAGNOSIS — E66.9 OBESITY (BMI 30.0-34.9): ICD-10-CM

## 2017-09-20 DIAGNOSIS — G47.33 OSA ON CPAP: Chronic | ICD-10-CM

## 2017-09-20 DIAGNOSIS — E11.42 DIABETIC POLYNEUROPATHY ASSOCIATED WITH TYPE 2 DIABETES MELLITUS: Primary | ICD-10-CM

## 2017-09-20 DIAGNOSIS — I10 ESSENTIAL HYPERTENSION: Chronic | ICD-10-CM

## 2017-09-20 DIAGNOSIS — E78.5 HYPERLIPIDEMIA, UNSPECIFIED HYPERLIPIDEMIA TYPE: ICD-10-CM

## 2017-09-20 DIAGNOSIS — N18.30 TYPE 2 DIABETES MELLITUS WITH STAGE 3 CHRONIC KIDNEY DISEASE, WITH LONG-TERM CURRENT USE OF INSULIN: ICD-10-CM

## 2017-09-20 PROCEDURE — 3008F BODY MASS INDEX DOCD: CPT | Mod: S$GLB,,, | Performed by: NURSE PRACTITIONER

## 2017-09-20 PROCEDURE — 3077F SYST BP >= 140 MM HG: CPT | Mod: S$GLB,,, | Performed by: NURSE PRACTITIONER

## 2017-09-20 PROCEDURE — 1126F AMNT PAIN NOTED NONE PRSNT: CPT | Mod: S$GLB,,, | Performed by: NURSE PRACTITIONER

## 2017-09-20 PROCEDURE — 3079F DIAST BP 80-89 MM HG: CPT | Mod: S$GLB,,, | Performed by: NURSE PRACTITIONER

## 2017-09-20 PROCEDURE — 99999 PR PBB SHADOW E&M-EST. PATIENT-LVL V: CPT | Mod: PBBFAC,,, | Performed by: NURSE PRACTITIONER

## 2017-09-20 PROCEDURE — 99214 OFFICE O/P EST MOD 30 MIN: CPT | Mod: S$GLB,,, | Performed by: NURSE PRACTITIONER

## 2017-09-20 PROCEDURE — 1159F MED LIST DOCD IN RCRD: CPT | Mod: S$GLB,,, | Performed by: NURSE PRACTITIONER

## 2017-09-20 RX ORDER — DULOXETIN HYDROCHLORIDE 60 MG/1
60 CAPSULE, DELAYED RELEASE ORAL DAILY
Qty: 30 CAPSULE | Refills: 11
Start: 2017-09-20 | End: 2018-12-03 | Stop reason: SDUPTHER

## 2017-09-20 RX ORDER — INSULIN ASPART 100 [IU]/ML
INJECTION, SOLUTION INTRAVENOUS; SUBCUTANEOUS
Qty: 8 BOX | Refills: 3 | Status: SHIPPED | OUTPATIENT
Start: 2017-09-20 | End: 2020-09-23

## 2017-10-09 ENCOUNTER — HOSPITAL ENCOUNTER (OUTPATIENT)
Dept: RADIOLOGY | Facility: OTHER | Age: 77
Discharge: HOME | End: 2017-10-09
Attending: INTERNAL MEDICINE
Payer: MEDICARE

## 2017-10-09 DIAGNOSIS — N20.0 NEPHROLITHIASIS: ICD-10-CM

## 2017-10-09 PROCEDURE — 76770 US EXAM ABDO BACK WALL COMP: CPT | Mod: TC

## 2017-10-09 PROCEDURE — 76770 US EXAM ABDO BACK WALL COMP: CPT | Mod: 26,,, | Performed by: RADIOLOGY

## 2017-10-10 ENCOUNTER — TELEPHONE (OUTPATIENT)
Dept: NEPHROLOGY | Facility: CLINIC | Age: 77
End: 2017-10-10

## 2017-10-10 NOTE — TELEPHONE ENCOUNTER
MD Astrid Sinha LPN             Please send the patient a copy of the results    Mailed copy to pt

## 2017-10-12 ENCOUNTER — OFFICE VISIT (OUTPATIENT)
Dept: INTERNAL MEDICINE | Facility: CLINIC | Age: 77
End: 2017-10-12
Payer: MEDICARE

## 2017-10-12 VITALS
BODY MASS INDEX: 33.68 KG/M2 | HEIGHT: 68 IN | DIASTOLIC BLOOD PRESSURE: 58 MMHG | SYSTOLIC BLOOD PRESSURE: 110 MMHG | HEART RATE: 60 BPM | WEIGHT: 222.25 LBS

## 2017-10-12 DIAGNOSIS — R05.9 COUGH: ICD-10-CM

## 2017-10-12 DIAGNOSIS — I10 ESSENTIAL HYPERTENSION: Chronic | ICD-10-CM

## 2017-10-12 DIAGNOSIS — J32.9 SINUSITIS, UNSPECIFIED CHRONICITY, UNSPECIFIED LOCATION: Primary | ICD-10-CM

## 2017-10-12 DIAGNOSIS — E11.42 TYPE 2 DIABETES MELLITUS WITH DIABETIC POLYNEUROPATHY, WITH LONG-TERM CURRENT USE OF INSULIN: ICD-10-CM

## 2017-10-12 DIAGNOSIS — R06.02 SHORTNESS OF BREATH: ICD-10-CM

## 2017-10-12 DIAGNOSIS — Z79.4 TYPE 2 DIABETES MELLITUS WITH DIABETIC POLYNEUROPATHY, WITH LONG-TERM CURRENT USE OF INSULIN: ICD-10-CM

## 2017-10-12 PROCEDURE — 99999 PR PBB SHADOW E&M-EST. PATIENT-LVL V: CPT | Mod: PBBFAC,,, | Performed by: NURSE PRACTITIONER

## 2017-10-12 PROCEDURE — 99214 OFFICE O/P EST MOD 30 MIN: CPT | Mod: S$GLB,,, | Performed by: NURSE PRACTITIONER

## 2017-10-12 RX ORDER — PROMETHAZINE HYDROCHLORIDE 6.25 MG/5ML
6.25 SYRUP ORAL EVERY 6 HOURS PRN
Qty: 118 ML | Refills: 0 | Status: SHIPPED | OUTPATIENT
Start: 2017-10-12 | End: 2018-03-20

## 2017-10-12 RX ORDER — AMOXICILLIN AND CLAVULANATE POTASSIUM 875; 125 MG/1; MG/1
1 TABLET, FILM COATED ORAL 2 TIMES DAILY
Qty: 14 TABLET | Refills: 0 | Status: SHIPPED | OUTPATIENT
Start: 2017-10-12 | End: 2017-10-12 | Stop reason: SDUPTHER

## 2017-10-12 RX ORDER — ALBUTEROL SULFATE 90 UG/1
2 AEROSOL, METERED RESPIRATORY (INHALATION) EVERY 4 HOURS PRN
Qty: 1 INHALER | Refills: 1 | Status: SHIPPED | OUTPATIENT
Start: 2017-10-12 | End: 2017-10-12 | Stop reason: SDUPTHER

## 2017-10-12 RX ORDER — AMOXICILLIN AND CLAVULANATE POTASSIUM 875; 125 MG/1; MG/1
1 TABLET, FILM COATED ORAL 2 TIMES DAILY
Qty: 14 TABLET | Refills: 0 | Status: SHIPPED | OUTPATIENT
Start: 2017-10-12 | End: 2017-10-18

## 2017-10-12 RX ORDER — ALBUTEROL SULFATE 90 UG/1
2 AEROSOL, METERED RESPIRATORY (INHALATION) EVERY 4 HOURS PRN
Qty: 1 INHALER | Refills: 1 | Status: SHIPPED | OUTPATIENT
Start: 2017-10-12 | End: 2017-11-08 | Stop reason: SDUPTHER

## 2017-10-12 RX ORDER — PROMETHAZINE HYDROCHLORIDE 6.25 MG/5ML
6.25 SYRUP ORAL EVERY 6 HOURS PRN
Qty: 118 ML | Refills: 0 | Status: SHIPPED | OUTPATIENT
Start: 2017-10-12 | End: 2017-10-12 | Stop reason: SDUPTHER

## 2017-10-12 NOTE — PROGRESS NOTES
INTERNAL MEDICINE PROGRESS/URGENT CARE NOTE    CHIEF COMPLAINT     Chief Complaint   Patient presents with    Cough    Abdominal Pain    Nasal Congestion    Sore Throat       HPI     Henrietta Villasenor is a 77 y.o. female with migraines, neuralgias, DM, MARLON on CPAP, HTN, CHF, CAD, HLD, ckd, DM, hyperparathyroidism, GERD, diverticulitis who presents for an urgent visit today.    Today with c/o cough nonproductive, nasal congestion, 1 episode of nose bleed 3 days ago, headache to the parietal region, chills, and subjective fever. +dizziness.     Back pain- unchanged from baseline.     Abd pain- to the lower abd unchanged from baseline. Reports diarrhea. No blood pr tarry black stools.     DM- blood sugar running 90's-200's Lantus 60 units nightly. Not taking novolog 2/2 decreased appetite.     Past Medical History:  Past Medical History:   Diagnosis Date    Abnormality of lung 11/08/2011    Stable bandlike opacities at the lung bases, most likely representing      Anxiety     Arthritis     CAD (coronary artery disease)     Calculus of ureter 2/22/2011    Cataract     CHF (congestive heart failure)     Chronic back pain 7/29/2012    Colon polyp 9/2010; 11/2010    Colon polyps 7/29/2012    Coronary artery disease involving native coronary artery of native heart with angina pectoris     Depression     Diabetes mellitus     Diabetes mellitus type II     Diverticulitis large intestine 7/27/2015    Diverticulosis 09/25/2010; 11/02/2010; 11/08/2011; 7/29/2012    Duodenal disorder 08/25/2011    Duodenal erosion noted on EGD.    Duodenal ulcer, unspecified as acute or chronic, without hemorrhage, perforation, or obstruction 8/24/2011    E. coli sepsis 12/2010    Due to left ureteral stone with left nephrostomy tube - hospitalized in [place]    Esophageal dysmotility 01/24/2012    Noted on upper GI-barium swallow.    Facial weakness 1969    Left facial weakness s/p left mastoidectomy in ~ 1969.    Fatty  liver 11/08/2011    Reported on CT-abdomen and in 06/2012 Gastro clinic visit note.    Gastric polyp 09/29/2010    GERD (gastroesophageal reflux disease) 7/29/2012    Hepatomegaly 11/08/2011    Reported on CT-abdomen    Herpes zoster with other nervous system complications(053.19) 2/28/2011    Hiatal hernia 06/26/2006; 09/29/2010; 08/25/2011    Noted on barium swallow 2006; noted on  EGD 2011.   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    Trouble in sleeping     Type II or unspecified type diabetes mellitus with neurological manifestations, not stated as uncontrolled(250.60)     Type II or unspecified type diabetes mellitus with peripheral circulatory disorders, not stated as uncontrolled(250.70)     Type II or unspecified type diabetes mellitus with renal manifestations, not stated as uncontrolled(250.40)        Home Medications:  Prior to Admission medications    Medication Sig Start Date End Date Taking? Authorizing Provider   ROXY FASTCLIX Misc  11/17/14   Historical Provider, MD RAMSEY SMARTVIEW TEST STRIP Strp TEST THREE TIMES DAILY 12/8/15   Jeannine Arriaga MD   albuterol (PROAIR HFA) 90 mcg/actuation inhaler Inhale 2 puffs into the lungs every 4 (four) hours as needed. Ventolin or  "PRoair for asthma 8/18/17   Jeannine Arriaga MD   ALCOHOL ANTISEPTIC PADS (ALCOHOL PREP PADS TOP)  10/17/16   Historical Provider, MD   aspirin (ECOTRIN) 81 MG EC tablet Take 1 tablet by mouth Daily. 5/29/12   Historical Provider, MD   atorvastatin (LIPITOR) 40 MG tablet Take 1 tablet (40 mg total) by mouth once daily. 8/18/17   Jeannine Arriaga MD   baclofen (LIORESAL) 10 MG tablet 1/2 - 1 tablet at bedtime as needed for muscle spasm 2/22/17   Jeannine Arriaga MD   BD ULTRA-FINE OLU PEN NEEDLES 32 x 5/32 " Ndle Uses 4 times daily, on multiple daily insulin injections 10/1/15   Sharda Schilling NP   carvedilol (COREG) 25 MG tablet TAKE 1 TABLET TWICE DAILY 2/24/17   Jeannine Arriaga MD   cholecalciferol, vitamin D3, (VITAMIN D) 2,000 unit Cap Take by mouth. 1 Capsule Oral Every morning    Historical Provider, MD   dicyclomine (BENTYL) 10 MG capsule Take 1 capsule (10 mg total) by mouth 3 (three) times daily as needed (abdominal pain). 7/27/17   Jeannine Arriaga MD   duloxetine (CYMBALTA) 60 MG capsule Take 1 capsule (60 mg total) by mouth once daily. 9/20/17 9/20/18  Dorian Flores DNP, NP   fluticasone (FLONASE) 50 mcg/actuation nasal spray SHAKE LQ AND U 2 SPRAYS IEN QD 8/18/17   Jeannine Arriaga MD   furosemide (LASIX) 40 MG tablet TAKE 1 TABLET twice daily 7/5/17   Jeannine Arriaga MD   gabapentin (NEURONTIN) 100 MG capsule TAKE 1 CAPSULE THREE TIMES DAILY  AND TAKE 3 CAPSULES AT BEDTIME 3/21/17   Jeannine Arriaga MD   insulin aspart (NOVOLOG) 100 unit/mL InPn pen 23 units with meals plus correction scale. Max daily cxbq=361 units 9/20/17   Dorian Flores DNP, NP   insulin glargine (LANTUS SOLOSTAR) 100 unit/mL (3 mL) InPn pen Inject 60 Units into the skin every evening. 6/16/17   Dorian Flores DNP, NP   Lactobacillus acidophilus (PROBIOTIC) 10 billion cell Cap Take 1 capsule by mouth once daily. 3/28/16   Aliya Monteiro, NP   loratadine (CLARITIN) 10 mg tablet Take 1 tablet (10 mg " "total) by mouth once daily. 2/22/17 2/22/18  Jeannine Arriaga MD   MUSCLE RUB, WITH CAMPHOR, 4-30-10 % Crea  2/6/17   Historical Provider, MD   nitroGLYCERIN (NITROSTAT) 0.4 MG SL tablet Place 1 tablet (0.4 mg total) under the tongue every 5 (five) minutes as needed. 11/21/16   Jeannine Arriaga MD   ondansetron (ZOFRAN) 4 MG tablet Take 1 tablet (4 mg total) by mouth every 8 (eight) hours as needed for Nausea. 5/17/17   Jeannine Arriaga MD   oxycodone-acetaminophen (PERCOCET)  mg per tablet One tablet 5 times daily 8/18/17   Jeannine Arriaga MD   oxycodone-acetaminophen (PERCOCET)  mg per tablet One tablet 5 times daily 9/18/17   Jeannine Arriaga MD   pantoprazole (PROTONIX) 40 MG tablet TAKE 1 TABLET(40 MG) BY MOUTH twice daily  Patient taking differently: TAKE 1 TABLET(40 MG) BY MOUTH daily 7/10/17   Jeannine Arriaga MD   pen needle, diabetic (SURE-FINE PEN NEEDLES) 29 gauge x 1/2" Ndle Use 4 times daily 2/22/17   Jeannine Arriaga MD   ranitidine (ZANTAC) 300 MG tablet Take 1 tablet (300 mg total) by mouth every evening. 7/10/17 7/10/18  Jeannine Arriaga MD   tizanidine (ZANAFLEX) 4 MG tablet Take 1 tablet (4 mg total) by mouth nightly as needed. 7/10/17   Jeannine Arriaga MD   triamcinolone acetonide 0.1% (KENALOG) 0.1 % cream Apply topically 3 (three) times daily. 10/27/16 7/21/17  Arnulfo Kohler DNP       Review of Systems:  Review of Systems   Constitutional: Positive for chills and fever. Negative for fatigue.   HENT: Positive for congestion, ear pain, nosebleeds, sinus pressure, sneezing and sore throat. Negative for postnasal drip and rhinorrhea.    Eyes: Negative for visual disturbance.   Respiratory: Positive for cough, shortness of breath and wheezing.    Cardiovascular: Negative for chest pain and palpitations.   Gastrointestinal: Positive for abdominal pain and diarrhea. Negative for constipation, nausea and vomiting.   Genitourinary: Negative.    Musculoskeletal: " "Positive for arthralgias and myalgias.   Skin: Negative for rash.   Neurological: Positive for dizziness and headaches. Negative for light-headedness.       Health Maintainence:   Immunizations:  Health Maintenance       Date Due Completion Date    DEXA SCAN 11/02/2014 11/2/2011 (Done)    Override on 11/2/2011: Done (no repeat bmd required)    Influenza Vaccine 08/01/2017 11/21/2016    Eye Exam 12/12/2017 12/12/2016    Hemoglobin A1c 02/18/2018 8/18/2017    Foot Exam 06/16/2018 6/16/2017    Override on 8/31/2016: Done    Override on 1/7/2016: Done    Override on 2/24/2015: Done    Lipid Panel 08/18/2018 8/18/2017    Colonoscopy 06/14/2020 6/14/2017    Override on 11/2/2010: Done (done per Chris Storey due 11/02/2013)    TETANUS VACCINE 03/20/2024 3/20/2014           PHYSICAL EXAM     BP (!) 110/58   Pulse 60   Ht 5' 8" (1.727 m)   Wt 100.8 kg (222 lb 3.6 oz)   BMI 33.79 kg/m²     Physical Exam   Constitutional: She is oriented to person, place, and time. She appears well-developed and well-nourished.   HENT:   Right Ear: Tympanic membrane is retracted. A middle ear effusion is present.   Nose: Mucosal edema and rhinorrhea present.   Mouth/Throat: Uvula is midline. Posterior oropharyngeal erythema present. No tonsillar exudate.   Left auditory canal surgery .   Eyes: Pupils are equal, round, and reactive to light.   Neck: Normal range of motion. Neck supple.   Cardiovascular: Normal rate and regular rhythm.    Pulmonary/Chest: Effort normal and breath sounds normal.   Abdominal: Soft. Bowel sounds are normal.   Musculoskeletal: Normal range of motion.   Neurological: She is alert and oriented to person, place, and time.   Skin: Skin is warm and dry.   Vitals reviewed.      LABS     Lab Results   Component Value Date    HGBA1C 7.3 (H) 08/18/2017    HGBA1C 7.3 (H) 08/18/2017     CMP  Sodium   Date Value Ref Range Status   08/18/2017 144 136 - 145 mmol/L Final     Potassium   Date Value Ref Range Status "   08/18/2017 3.7 3.5 - 5.1 mmol/L Final     Chloride   Date Value Ref Range Status   08/18/2017 102 95 - 110 mmol/L Final     CO2   Date Value Ref Range Status   08/18/2017 32 (H) 23 - 29 mmol/L Final     Glucose   Date Value Ref Range Status   08/18/2017 118 (H) 70 - 110 mg/dL Final     BUN, Bld   Date Value Ref Range Status   08/18/2017 11 8 - 23 mg/dL Final     Creatinine   Date Value Ref Range Status   08/18/2017 1.0 0.5 - 1.4 mg/dL Final     Calcium   Date Value Ref Range Status   08/18/2017 9.3 8.7 - 10.5 mg/dL Final     Total Protein   Date Value Ref Range Status   08/18/2017 7.4 6.0 - 8.4 g/dL Final     Albumin   Date Value Ref Range Status   08/18/2017 3.3 (L) 3.5 - 5.2 g/dL Final     Total Bilirubin   Date Value Ref Range Status   08/18/2017 0.4 0.1 - 1.0 mg/dL Final     Comment:     For infants and newborns, interpretation of results should be based  on gestational age, weight and in agreement with clinical  observations.  Premature Infant recommended reference ranges:  Up to 24 hours.............<8.0 mg/dL  Up to 48 hours............<12.0 mg/dL  3-5 days..................<15.0 mg/dL  6-29 days.................<15.0 mg/dL       Alkaline Phosphatase   Date Value Ref Range Status   08/18/2017 91 55 - 135 U/L Final     AST   Date Value Ref Range Status   08/18/2017 16 10 - 40 U/L Final     ALT   Date Value Ref Range Status   08/18/2017 11 10 - 44 U/L Final     Anion Gap   Date Value Ref Range Status   08/18/2017 10 8 - 16 mmol/L Final     eGFR if    Date Value Ref Range Status   08/18/2017 >60 >60 mL/min/1.73 m^2 Final     eGFR if non    Date Value Ref Range Status   08/18/2017 54 (A) >60 mL/min/1.73 m^2 Final     Comment:     Calculation used to obtain the estimated glomerular filtration  rate (eGFR) is the CKD-EPI equation. Since race is unknown   in our information system, the eGFR values for   -American and Non--American patients are given   for each  creatinine result.       Lab Results   Component Value Date    WBC 10.39 08/18/2017    HGB 12.0 08/18/2017    HCT 38.2 08/18/2017    MCV 87 08/18/2017     08/18/2017     Lab Results   Component Value Date    CHOL 176 08/18/2017    CHOL 197 11/21/2016    CHOL 263 (H) 12/31/2015     Lab Results   Component Value Date    HDL 46 08/18/2017    HDL 56 11/21/2016    HDL 46 12/31/2015     Lab Results   Component Value Date    LDLCALC 112.8 08/18/2017    LDLCALC 122.2 11/21/2016    LDLCALC 189.4 (H) 12/31/2015     Lab Results   Component Value Date    TRIG 86 08/18/2017    TRIG 94 11/21/2016    TRIG 138 12/31/2015     Lab Results   Component Value Date    CHOLHDL 26.1 08/18/2017    CHOLHDL 28.4 11/21/2016    CHOLHDL 17.5 (L) 12/31/2015     Lab Results   Component Value Date    TSH 1.596 05/17/2017       ASSESSMENT/PLAN     Henrietta Villasenor is a 77 y.o. female with  Past Medical History:   Diagnosis Date    Abnormality of lung 11/08/2011    Stable bandlike opacities at the lung bases, most likely representing      Anxiety     Arthritis     CAD (coronary artery disease)     Calculus of ureter 2/22/2011    Cataract     CHF (congestive heart failure)     Chronic back pain 7/29/2012    Colon polyp 9/2010; 11/2010    Colon polyps 7/29/2012    Coronary artery disease involving native coronary artery of native heart with angina pectoris     Depression     Diabetes mellitus     Diabetes mellitus type II     Diverticulitis large intestine 7/27/2015    Diverticulosis 09/25/2010; 11/02/2010; 11/08/2011; 7/29/2012    Duodenal disorder 08/25/2011    Duodenal erosion noted on EGD.    Duodenal ulcer, unspecified as acute or chronic, without hemorrhage, perforation, or obstruction 8/24/2011    E. coli sepsis 12/2010    Due to left ureteral stone with left nephrostomy tube - hospitalized in [place]    Esophageal dysmotility 01/24/2012    Noted on upper GI-barium swallow.    Facial weakness 1969    Left facial weakness  s/p left mastoidectomy in ~ 1969.    Fatty liver 11/08/2011    Reported on CT-abdomen and in 06/2012 Gastro clinic visit note.    Gastric polyp 09/29/2010    GERD (gastroesophageal reflux disease) 7/29/2012    Hepatomegaly 11/08/2011    Reported on CT-abdomen    Herpes zoster with other nervous system complications(053.19) 2/28/2011    Hiatal hernia 06/26/2006; 09/29/2010; 08/25/2011    Noted on barium swallow 2006; noted on  EGD 2011.   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    Trouble in sleeping     Type II or unspecified type diabetes mellitus with neurological manifestations, not stated as uncontrolled(250.60)     Type II or unspecified type diabetes mellitus with peripheral circulatory disorders, not stated as uncontrolled(250.70)     Type II or unspecified type diabetes mellitus with renal manifestations, not stated as uncontrolled(250.40)      Sinusitis, unspecified chronicity, unspecified location- advised rest and hydration. Saline rinses and hot showers.   -     amoxicillin-clavulanate 875-125mg (AUGMENTIN) 875-125 mg per tablet; Take 1 tablet by mouth 2 (two) times daily.  Dispense: 14 tablet; Refill: 0    Cough  -     promethazine (PHENERGAN) 6.25 mg/5 mL syrup; Take 5 mLs (6.25 mg  total) by mouth every 6 (six) hours as needed (cough).  Dispense: 118 mL; Refill: 0    Shortness of breath  -     albuterol (PROAIR HFA) 90 mcg/actuation inhaler; Inhale 2 puffs into the lungs every 4 (four) hours as needed. Ventolin or PRoair for asthma  Dispense: 1 Inhaler; Refill: 1    Type 2 diabetes mellitus with diabetic polyneuropathy, with long-term current use of insulin- Advised tight glucose control. Cont lantus. Poorly controlled off novolog. Resume with meals.     Essential hypertension- at goal. avoid otc decongestants. Cont current meds.               Follow up as needed and with PCP for routine f/u     Patient education provided from Reg. Patient was counseled on when and how to seek emergent care.       Gloria GIBBONS, [place], FNP-c   Department of Internal Medicine - [facility]  1:46 PM

## 2017-10-16 ENCOUNTER — HOSPITAL ENCOUNTER (EMERGENCY)
Facility: HOSPITAL | Age: 77
Discharge: HOME | End: 2017-10-16
Attending: EMERGENCY MEDICINE
Payer: MEDICARE

## 2017-10-16 ENCOUNTER — NURSE TRIAGE (OUTPATIENT)
Dept: ADMINISTRATIVE | Facility: CLINIC | Age: 77
End: 2017-10-16

## 2017-10-16 VITALS
RESPIRATION RATE: 14 BRPM | OXYGEN SATURATION: 97 % | DIASTOLIC BLOOD PRESSURE: 89 MMHG | SYSTOLIC BLOOD PRESSURE: 188 MMHG | BODY MASS INDEX: 33.34 KG/M2 | TEMPERATURE: 98 F | WEIGHT: 220 LBS | HEIGHT: 68 IN | HEART RATE: 64 BPM

## 2017-10-16 DIAGNOSIS — R05.9 COUGH: ICD-10-CM

## 2017-10-16 DIAGNOSIS — J06.9 UPPER RESPIRATORY TRACT INFECTION, UNSPECIFIED TYPE: Primary | ICD-10-CM

## 2017-10-16 DIAGNOSIS — J45.909 REACTIVE AIRWAY DISEASE WITHOUT COMPLICATION, UNSPECIFIED ASTHMA SEVERITY, UNSPECIFIED WHETHER PERSISTENT: ICD-10-CM

## 2017-10-16 DIAGNOSIS — I50.9 ACUTE ON CHRONIC CONGESTIVE HEART FAILURE, UNSPECIFIED CONGESTIVE HEART FAILURE TYPE: ICD-10-CM

## 2017-10-16 DIAGNOSIS — R06.02 SOB (SHORTNESS OF BREATH): ICD-10-CM

## 2017-10-16 LAB
ABO + RH BLD: NORMAL
ALBUMIN SERPL BCP-MCNC: 3.2 G/DL
ALP SERPL-CCNC: 85 U/L
ALT SERPL W/O P-5'-P-CCNC: 18 U/L
ANION GAP SERPL CALC-SCNC: 9 MMOL/L
AST SERPL-CCNC: 27 U/L
BACTERIA #/AREA URNS AUTO: ABNORMAL /HPF
BASOPHILS # BLD AUTO: 0.04 K/UL
BASOPHILS NFR BLD: 0.6 %
BILIRUB SERPL-MCNC: 0.6 MG/DL
BILIRUB UR QL STRIP: NEGATIVE
BLD GP AB SCN CELLS X3 SERPL QL: NORMAL
BNP SERPL-MCNC: 134 PG/ML
BUN SERPL-MCNC: 8 MG/DL
CALCIUM SERPL-MCNC: 9.5 MG/DL
CHLORIDE SERPL-SCNC: 102 MMOL/L
CLARITY UR REFRACT.AUTO: ABNORMAL
CO2 SERPL-SCNC: 32 MMOL/L
COLOR UR AUTO: ABNORMAL
CREAT SERPL-MCNC: 1.1 MG/DL
DIFFERENTIAL METHOD: ABNORMAL
EOSINOPHIL # BLD AUTO: 0.2 K/UL
EOSINOPHIL NFR BLD: 3.1 %
ERYTHROCYTE [DISTWIDTH] IN BLOOD BY AUTOMATED COUNT: 14.4 %
EST. GFR  (AFRICAN AMERICAN): 56 ML/MIN/1.73 M^2
EST. GFR  (NON AFRICAN AMERICAN): 48.5 ML/MIN/1.73 M^2
GLUCOSE SERPL-MCNC: 109 MG/DL
GLUCOSE UR QL STRIP: NEGATIVE
HCT VFR BLD AUTO: 38.9 %
HGB BLD-MCNC: 12.3 G/DL
HGB UR QL STRIP: ABNORMAL
HYALINE CASTS UR QL AUTO: 11 /LPF
IMM GRANULOCYTES # BLD AUTO: 0.04 K/UL
IMM GRANULOCYTES NFR BLD AUTO: 0.6 %
KETONES UR QL STRIP: NEGATIVE
LACTATE SERPL-SCNC: 1.1 MMOL/L
LEUKOCYTE ESTERASE UR QL STRIP: NEGATIVE
LYMPHOCYTES # BLD AUTO: 2.5 K/UL
LYMPHOCYTES NFR BLD: 35.8 %
MCH RBC QN AUTO: 27.4 PG
MCHC RBC AUTO-ENTMCNC: 31.6 G/DL
MCV RBC AUTO: 87 FL
MICROSCOPIC COMMENT: ABNORMAL
MONOCYTES # BLD AUTO: 0.8 K/UL
MONOCYTES NFR BLD: 11 %
NEUTROPHILS # BLD AUTO: 3.5 K/UL
NEUTROPHILS NFR BLD: 48.9 %
NITRITE UR QL STRIP: NEGATIVE
NRBC BLD-RTO: 0 /100 WBC
PH UR STRIP: 5 [PH] (ref 5–8)
PLATELET # BLD AUTO: 235 K/UL
PMV BLD AUTO: 10 FL
POTASSIUM SERPL-SCNC: 3.2 MMOL/L
PROT SERPL-MCNC: 8.2 G/DL
PROT UR QL STRIP: ABNORMAL
RBC # BLD AUTO: 4.49 M/UL
RBC #/AREA URNS AUTO: 22 /HPF (ref 0–4)
SODIUM SERPL-SCNC: 143 MMOL/L
SP GR UR STRIP: 1.02 (ref 1–1.03)
SQUAMOUS #/AREA URNS AUTO: 0 /HPF
TROPONIN I SERPL DL<=0.01 NG/ML-MCNC: 0.01 NG/ML
URN SPEC COLLECT METH UR: ABNORMAL
UROBILINOGEN UR STRIP-ACNC: NEGATIVE EU/DL
WBC # BLD AUTO: 7.09 K/UL
WBC #/AREA URNS AUTO: 3 /HPF (ref 0–5)

## 2017-10-16 PROCEDURE — 25000242 PHARM REV CODE 250 ALT 637 W/ HCPCS: Performed by: PHYSICIAN ASSISTANT

## 2017-10-16 PROCEDURE — 25000003 PHARM REV CODE 250: Performed by: STUDENT IN AN ORGANIZED HEALTH CARE EDUCATION/TRAINING PROGRAM

## 2017-10-16 PROCEDURE — 99284 EMERGENCY DEPT VISIT MOD MDM: CPT | Mod: ,,, | Performed by: EMERGENCY MEDICINE

## 2017-10-16 PROCEDURE — 83605 ASSAY OF LACTIC ACID: CPT

## 2017-10-16 PROCEDURE — 87086 URINE CULTURE/COLONY COUNT: CPT

## 2017-10-16 PROCEDURE — 81001 URINALYSIS AUTO W/SCOPE: CPT

## 2017-10-16 PROCEDURE — 83880 ASSAY OF NATRIURETIC PEPTIDE: CPT

## 2017-10-16 PROCEDURE — 86901 BLOOD TYPING SEROLOGIC RH(D): CPT

## 2017-10-16 PROCEDURE — 80053 COMPREHEN METABOLIC PANEL: CPT

## 2017-10-16 PROCEDURE — 84484 ASSAY OF TROPONIN QUANT: CPT

## 2017-10-16 PROCEDURE — 86900 BLOOD TYPING SEROLOGIC ABO: CPT

## 2017-10-16 PROCEDURE — 99284 EMERGENCY DEPT VISIT MOD MDM: CPT | Mod: 25

## 2017-10-16 PROCEDURE — 87040 BLOOD CULTURE FOR BACTERIA: CPT

## 2017-10-16 PROCEDURE — 85025 COMPLETE CBC W/AUTO DIFF WBC: CPT

## 2017-10-16 PROCEDURE — 93005 ELECTROCARDIOGRAM TRACING: CPT

## 2017-10-16 PROCEDURE — 93010 ELECTROCARDIOGRAM REPORT: CPT | Mod: ,,, | Performed by: INTERNAL MEDICINE

## 2017-10-16 PROCEDURE — 94640 AIRWAY INHALATION TREATMENT: CPT

## 2017-10-16 RX ORDER — AZITHROMYCIN 250 MG/1
250 TABLET, FILM COATED ORAL DAILY
Qty: 6 TABLET | Refills: 0 | Status: SHIPPED | OUTPATIENT
Start: 2017-10-16 | End: 2017-12-08

## 2017-10-16 RX ORDER — FUROSEMIDE 10 MG/ML
60 INJECTION INTRAMUSCULAR; INTRAVENOUS
Status: DISCONTINUED | OUTPATIENT
Start: 2017-10-16 | End: 2017-10-17 | Stop reason: HOSPADM

## 2017-10-16 RX ORDER — POTASSIUM CHLORIDE 20 MEQ/1
60 TABLET, EXTENDED RELEASE ORAL
Status: COMPLETED | OUTPATIENT
Start: 2017-10-16 | End: 2017-10-16

## 2017-10-16 RX ORDER — ALBUTEROL SULFATE 0.83 MG/ML
2.5 SOLUTION RESPIRATORY (INHALATION)
Status: COMPLETED | OUTPATIENT
Start: 2017-10-16 | End: 2017-10-16

## 2017-10-16 RX ADMIN — POTASSIUM CHLORIDE 60 MEQ: 1500 TABLET, EXTENDED RELEASE ORAL at 06:10

## 2017-10-16 RX ADMIN — ALBUTEROL SULFATE 2.5 MG: 2.5 SOLUTION RESPIRATORY (INHALATION) at 04:10

## 2017-10-16 RX ADMIN — ALBUTEROL SULFATE 2.5 MG: 2.5 SOLUTION RESPIRATORY (INHALATION) at 05:10

## 2017-10-16 NOTE — ED PROVIDER NOTES
Encounter Date: 10/16/2017    SCRIBE #1 NOTE: I, Cortney Magana, am scribing for, and in the presence of,  Dr. Barlow. I have scribed the following portions of the note - the Resident attestation.       History     Chief Complaint   Patient presents with    Melena    Shortness of Breath     Ms. Villasenor is a 77 y.o. female with HTN, DM, HLD, MARLON on CPAP, CAD, diverticulosis, and colon polyps who presents to the ED with SOB and worsening cough since Friday.  She was originally seen in Internal Medicine clinic on Friday and started on Augmentin.  Additionally, patient reports having a sore throat, postnasal drip, non-productive cough, nasal congestion, headache, chills and subjective fever.  She also endorses some dark stools but says she takes Iron.  She reports 1 episode of diaphoresis last night and states her entire bed was wet with her sweat. She denies abdominal pain, chest pain, dysuria, decreased urine output. Her symptoms became worse despite Augmentin.           Review of patient's allergies indicates:   Allergen Reactions    Crestor [rosuvastatin]      Cramping in legs    Ezetimibe      Other reaction(s): abdominal pain, Diarrhea    Hydrocodone      Other reaction(s): Itching    Lisinopril      Other reaction(s): cough      Sulfa (sulfonamide antibiotics)      Itching and Rash      Sulfamethoxazole     Sulfamethoxazole-trimethoprim     Trimethoprim     Valsartan      Other reaction(s): Angioedema     Past Medical History:   Diagnosis Date    Abnormality of lung 11/08/2011    Stable bandlike opacities at the lung bases, most likely representing      Anxiety     Arthritis     CAD (coronary artery disease)     Calculus of ureter 2/22/2011    Cataract     CHF (congestive heart failure)     Chronic back pain 7/29/2012    Colon polyp 9/2010; 11/2010    Colon polyps 7/29/2012    Coronary artery disease involving native coronary artery of native heart with angina pectoris     Depression      Diabetes mellitus     Diabetes mellitus type II     Diverticulitis large intestine 7/27/2015    Diverticulosis 09/25/2010; 11/02/2010; 11/08/2011; 7/29/2012    Duodenal disorder 08/25/2011    Duodenal erosion noted on EGD.    Duodenal ulcer, unspecified as acute or chronic, without hemorrhage, perforation, or obstruction 8/24/2011    E. coli sepsis 12/2010    Due to left ureteral stone with left nephrostomy tube - hospitalized in [place]    Esophageal dysmotility 01/24/2012    Noted on upper GI-barium swallow.    Facial weakness 1969    Left facial weakness s/p left mastoidectomy in ~ 1969.    Fatty liver 11/08/2011    Reported on CT-abdomen and in 06/2012 Gastro clinic visit note.    Gastric polyp 09/29/2010    GERD (gastroesophageal reflux disease) 7/29/2012    Hepatomegaly 11/08/2011    Reported on CT-abdomen    Herpes zoster with other nervous system complications(053.19) 2/28/2011    Hiatal hernia 06/26/2006; 09/29/2010; 08/25/2011    Noted on barium swallow 2006; noted on  EGD 2011.   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    Trouble in sleeping     Type II or unspecified type diabetes mellitus with neurological manifestations, not stated as uncontrolled(250.60)     Type II or unspecified  type diabetes mellitus with peripheral circulatory disorders, not stated as uncontrolled(250.70)     Type II or unspecified type diabetes mellitus with renal manifestations, not stated as uncontrolled(250.40)      Past Surgical History:   Procedure Laterality Date    BELPHAROPTOSIS REPAIR      s/p LAMBERTO levator repair - Dr. Dejesus    CARDIAC CATHETERIZATION      CARPAL TUNNEL RELEASE  3/13/2012    CHOLECYSTECTOMY      COLONOSCOPY N/A 11/16/2016    Procedure: COLONOSCOPY;  Surgeon: Chris Storey MD;  Location: [facility] ENDO ([facility]);  Service: Endoscopy;  Laterality: N/A;    COLONOSCOPY N/A 6/14/2017    Procedure: COLONOSCOPY;  Surgeon: Chris Storey MD;  Location: [facility] ENDO ([facility]);  Service: Endoscopy;  Laterality: N/A;  colonoscopy in 3 months with better bowel prep. - Split PEG prep ordered    EXTRACORPOREAL SHOCK WAVE LITHOTRIPSY      HYSTERECTOMY      mastoid tumor removal  1969    Left mastoidectomy with residual left facial weakness.    NEPHROSTOMY      TOTAL KNEE ARTHROPLASTY  9/13/2012    Left     Family History   Problem Relation Age of Onset    Sleep apnea Sister     Cancer Sister      breast    Diabetes Sister     Cancer Mother      brain tumor    Hypertension Mother     Heart disease Father     Heart attack Father     Dementia Brother     Diabetes Brother     Lupus Brother     No Known Problems Daughter     No Known Problems Son     Diabetes Sister     Lupus Sister     Diabetes Sister     Cancer Sister     Kidney disease Sister     Heart disease Sister     Liver cancer Brother     Cirrhosis Brother     No Known Problems Daughter     No Known Problems Son     No Known Problems Son     Diabetes Other     Ovarian cancer Other      Niece     Glaucoma Neg Hx     Blindness Neg Hx     Celiac disease Neg Hx     Colon cancer Neg Hx     Colon polyps Neg Hx     Esophageal cancer Neg Hx     Inflammatory bowel disease Neg Hx     Liver disease Neg Hx     Rectal cancer  Neg Hx     Stomach cancer Neg Hx     Ulcerative colitis Neg Hx     Melanoma Neg Hx     Multiple sclerosis Neg Hx     Psoriasis Neg Hx      Social History   Substance Use Topics    Smoking status: Former Smoker     Packs/day: 1.00     Years: 15.00     Types: Cigarettes     Quit date: 7/24/2000    Smokeless tobacco: Never Used    Alcohol use No     Review of Systems   Constitutional: Positive for appetite change (decreased), chills, fatigue and fever (subjective).   HENT: Positive for congestion, postnasal drip and sore throat.    Eyes: Negative for visual disturbance.   Respiratory: Positive for cough, chest tightness and shortness of breath.    Cardiovascular: Negative for chest pain and leg swelling.   Gastrointestinal: Negative for abdominal distention, abdominal pain, constipation, diarrhea, nausea and vomiting.   Genitourinary: Negative for decreased urine volume and dysuria.   Neurological: Positive for dizziness, weakness, light-headedness and headaches.   Psychiatric/Behavioral: Negative for confusion.       Physical Exam     Initial Vitals [10/16/17 1501]   BP Pulse Resp Temp SpO2   (!) 201/84 66 20 97.9 °F (36.6 °C) 99 %      MAP       123         Physical Exam    Constitutional: She appears well-developed and well-nourished. She is not diaphoretic.   Ill-appearing   HENT:   Head: Normocephalic and atraumatic.   Mouth/Throat: Oropharynx is clear and moist.   Eyes: Conjunctivae and EOM are normal. Pupils are equal, round, and reactive to light.   Cardiovascular: Normal rate, regular rhythm, normal heart sounds and intact distal pulses.   Pulmonary/Chest: No respiratory distress. She has no wheezes.   Crackles at lung bases b/l   Abdominal: Soft. Bowel sounds are normal. She exhibits no distension. There is no tenderness. There is CVA tenderness (left).   Musculoskeletal: Normal range of motion. She exhibits no edema or tenderness.   Neurological: She is alert and oriented to person, place, and time.    Skin: Skin is warm and dry.   Psychiatric: She has a normal mood and affect.         ED Course   Procedures  Labs Reviewed   CBC W/ AUTO DIFFERENTIAL - Abnormal; Notable for the following:        Result Value    MCHC 31.6 (*)     Immature Granulocytes 0.6 (*)     All other components within normal limits   COMPREHENSIVE METABOLIC PANEL - Abnormal; Notable for the following:     Potassium 3.2 (*)     CO2 32 (*)     Albumin 3.2 (*)     eGFR if  56.0 (*)     eGFR if non  48.5 (*)     All other components within normal limits   URINALYSIS, REFLEX TO URINE CULTURE - Abnormal; Notable for the following:     Appearance, UA Cloudy (*)     Protein, UA 1+ (*)     Occult Blood UA 2+ (*)     All other components within normal limits    Narrative:     Preferred Collection Type->Urine, Catheterized   URINALYSIS MICROSCOPIC - Abnormal; Notable for the following:     RBC, UA 22 (*)     Bacteria, UA Few (*)     Hyaline Casts, UA 11 (*)     All other components within normal limits    Narrative:     Preferred Collection Type->Urine, Catheterized   B-TYPE NATRIURETIC PEPTIDE - Abnormal; Notable for the following:      (*)     All other components within normal limits    Narrative:     ADD-ON BNP [id number] PER ANA ELIAS MD 18:40  10/16/2017    CULTURE, BLOOD   CULTURE, BLOOD   CULTURE, URINE   CULTURE, URINE   LACTIC ACID, PLASMA   B-TYPE NATRIURETIC PEPTIDE   TROPONIN I   TROPONIN I    Narrative:     ADD-ON BNP [id number] PER ANA ELIAS MD 18:40  10/16/2017   ADD ON TROPONIN PER DR. ANA ELIAS AT  10/16/2017  20:18     TYPE & SCREEN        Imaging Results          CT Renal Stone Study ABD Pelvis WO (Final result)  Result time 10/16/17 18:43:47    Final result by Evangelina Recinos MD (10/16/17 18:43:47)                 Impression:        1. No acute process identified in the abdomen and pelvis.    2. 1 cm nonobstructing right renal stone.    3. Colonic  diverticulosis without evidence of diverticulitis.    4. Significant aortic atherosclerotic calcifications.    5. Degenerative changes of the lumbar spine.  ______________________________________     Electronically signed by resident: MATTHEW TILLEY MD  Date:     10/16/17  Time:    18:43            As the supervising and teaching physician, I personally reviewed the images and resident's interpretation and I agree with the findings.          Electronically signed by: SCOTTY PUCKETT MD  Date:     10/16/17  Time:    18:43              Narrative:    Procedure comments: 5-mm axial images through the abdomen and pelvis were obtained without the use of contrast material per the renal stone study protocol.  Coronal, axial, and sagittal reformatted images were reviewed.    Comparison: Ultrasound retroperitoneal 4/2016. CT abdomen pelvis 8/2015.    Findings:    There is a stable opacity in the left lingula. There is a minor subsegmental atelectatic change in the right lung base. No pleural effusion.    Imaged portions of the liver and spleen demonstrate homogenous attenuation. The gallbladder is surgically removed.  The bile ducts, adrenal glands, and pancreas demonstrate no abnormality.  There is colonic diverticulosis without evidence of diverticulitis. The stomach, small bowel, and colon are unremarkable without evidence of obstruction or inflammation.  There is no ascites or free air.    The kidneys are normal in size and position.  There is a 1 cm nonobstructing stone within the lower pole of the right kidney.  The ureters are normal in course to the urinary bladder which itself is normal.  The urinary bladder is contracted. The uterus is surgically removed.    The abdomen and atherosclerotic calcifications of the aorta and branching vessels.  There is no lymph node enlargement.  The osseous structures demonstrate degenerative changes of the lumbar spine with evidence of facet arthropathy at multiple levels. There  is a small left-sided fat-containing inguinal hernia.                             X-Ray Chest PA And Lateral (Final result)  Result time 10/16/17 17:07:04    Final result by Marvin Weston MD (10/16/17 17:07:04)                 Impression:      No acute cardiopulmonary process identified.      Electronically signed by: MARVIN WESTON MD  Date:     10/16/17  Time:    17:07              Narrative:    Chest PA and lateral.  Comparison: 12/2016.    Cardiac silhouette is normal and stable in size.  There is mild stable tortuosity of the aorta.  Lungs are symmetrically expanded.  Chronic lung changes with diffuse interstitial prominence seen, unchanged over multiple previous exams.  There is mild scarring seen at the left lung base.  No evidence of of new focal consolidative process, pneumothorax, or significant effusion.  Bones show DJD.  No free air visualized beneath the diaphragm.                                       APC / Resident Notes:   Ms. Villasenor is a 77 y.o. female with HTN, DM, HLD, MARLON on CPAP, CAD, diverticulosis, and colon polyps who presents to the ED with SOB and worsening cough since Friday even after starting Augmentin.      Patient was originally seen in Intake and had labs (including blood cultures and lactate) and CXR ordered.  Patient receiving neb treatment.  FREDERIC was also performed that did not show melena or BRB and was FOBT negative.     CBC is benign.  CMP significant for hypokalemia (3.2) and mildly elevated bicarb (32).  Will replace K. Lactate normal. CXR shows no acute intrathoracic pathological process.    UA significant for hematuria without signs of UTI.  Will obtain CT renal stone study arielle since pt has history of kidney stones and left CVA tenderness. CT shows 1cm nonobstructing stone on right.      Patient reports feeling much better after neb treatments. BNP & troponin pending.  If normal, patient stable for discharge home and likely has URI.  I am signing over the remainder care of  this patient to oncoming resident, Dr. Balbuena, under the continued supervision of attending Dr. Barlow.    1016/17 7:14 PM  Izabel Henry, PGY-1  [facility] IM Prelim        9:45PM: I have assumed care of this patient from Dr. Henry.  and trop negative. Pt with crackles on exam. Will give lasix IV and discharge to home with z-westley and return precautions. D/w Dr. Barlow.   Ross Balbuena MD  PGY-2 [employer] EM        Scribe Attestation:   Scribe #1: I performed the above scribed service and the documentation accurately describes the services I performed. I attest to the accuracy of the note.    Attending Attestation:   Physician Attestation Statement for Resident:  As the supervising MD   Physician Attestation Statement: I have personally seen and examined this patient.   I agree with the above history. -: Patient presents with SOB, flank pain, subjective fever, chills, and reported black colored stool. I considered upper GI bleed, UTI, pneumonia, URI, pyelonephritis, renal colic, MI/ACS, CHF exacerbation, COPD exacerbation, among other diagnoses. H&H normal. Felt upper GI bleed less likely at this time because patient taking iron and rectal exam reported to me by PA Le negative for melena and FOBT negative. Urine notable for red blood cells. LE and nitrate negative. Patient has history of kidney stone and has flank pain today. Will order CT renal colic to rule out ureteral stone. Also will add troponin and BNP to rule out cardiac cause of SOB and episode of diaphoresis. Though clinically, I doubt MI or ACS.    Labs unremarkable except for mild elevation in BNP.  Clinically I felt she may be having mild CHF exacerbation.  Lasix IV given in ED and she was instructed to take lasix as prescribed as an outpatient.  Was instructed to f/u with her PCP if her sx's were not improving in a few days.  On initial exam was having wheezing which improved with nebs.  Felt this was most likely related to URI and RAD.   Encouraged to continue home alb inhaler.  On re-assessment after tx'd given in ED she was well appearing and in NAD.  I felt she was stable for DC And o/p f/u with PCP.  The patient was given strict return precautions and follow up instructions and expressed understanding of these.  The patient felt comfortable with the plan for discharge and I did as well.  Stable for DC.       As the supervising MD I agree with the above PE.    As the supervising MD I agree with the above treatment, course, plan, and disposition.  I have reviewed and agree with the residents interpretation of the following: lab data, CT scans, x-rays and EKG.  I have reviewed the following: old records at this facility.                    ED Course      Clinical Impression:   The primary encounter diagnosis was Upper respiratory tract infection, unspecified type. Diagnoses of SOB (shortness of breath), Cough, Reactive airway disease without complication, unspecified asthma severity, unspecified whether persistent, and Acute on chronic congestive heart failure, unspecified congestive heart failure type were also pertinent to this visit.    Disposition:   Disposition: Discharged  Condition: Stable                        Randall Barlow MD  10/17/17 0455

## 2017-10-16 NOTE — TELEPHONE ENCOUNTER
Patient is c/o black like tar stools that is like diarrhea.  She states it is different from her stools from the intake of fe.

## 2017-10-16 NOTE — PROVIDER PROGRESS NOTES - EMERGENCY DEPT.
Encounter Date: 10/16/2017    ED Physician Progress Notes        Physician Note:   Time seen by provider: 4:11 PM    This is a 77 y.o. female with medical history of HTN, DM, HLD, MARLON on CPAP, CAD, diverticulosis, colon polyps, seen in Internal Medicine clinic 4 days ago and started on Augmentin for non-productive cough, nasal congestion, headache, chills and subjective fever, dizziness who presents to the Emergency Department today with black stools, diarrhea, and worsening cough and SOB since yesterday evening. She endorses persistent cough, SOB with movement, subjective fever and chills, headache, congestion. She reports 1 episode of diaphoresis last night and states her entire bed was wet with her sweat. She denies abdominal pain, chest pain, dysuria, urinary frequency, abd pain. Her symptoms became worse despite Augmentin. On exam, afebrile. satting at 99%. She is fatigued and ill appearing. Wheezes and rales appreciated in sadie mid and lower lung fields. Regular rate and rhythm without murmurs. Will order labs including (blood cultures and lactic acid) and imaging, give breathing treatment, and send to the main ED for further disposition and management.    I initially evaluated this patient and ordered workup while in intake.  The patient will receive a full evaluation in an ED pod when space is available.  All results from tests ordered in intake will not be followed by the intake team, including myself. All results will be followed by the ED Pod team.    I, Andreina Aguair, personally performed the services described in this documentation. All medical record entries made by the scribe were at my direction and in my presence.  I have reviewed the chart and agree that the record reflects my personal performance and is accurate and complete. Andreina Aguiar, APC.  4:27 PM 10/16/2017

## 2017-10-16 NOTE — TELEPHONE ENCOUNTER
Reason for Disposition   Bloody, black, or tarry bowel movements    Protocols used: [facility]

## 2017-10-16 NOTE — ED TRIAGE NOTES
Patient reports black stools and increased SOB since Sunday.   Patient has a deep congested cough that she states is choking her.  Wheezes noted scattered throughout lung fields.    GENERAL: The patient is an elderly female in no apparent distress. Alert and oriented x4.                                                HEENT: Head is normocephalic and atraumatic. Extraocular muscles are intact. Pupils are equal, round, and reactive to light and accommodation. Nares appeared normal. Mouth is well hydrated and without lesions. Mucous membranes are moist. Posterior pharynx clear of any exudate or lesions.    NECK: Supple. No carotid bruits. No lymphadenopathy or thyromegaly.    LUNGS:  Wheezes    HEART: Regular rate and rhythm without murmur.     ABDOMEN: Soft, nontender, and nondistended. Positive bowel sounds. No hepatosplenomegaly was noted.     EXTREMITIES: Without any cyanosis, clubbing, rash, lesions or edema.     NEUROLOGIC: Cranial nerves II through XII are grossly intact.     PSYCHIATRIC: Flat affect, but denies suicidal or homicidal ideations.    SKIN: No ulceration or induration present.

## 2017-10-17 LAB — BACTERIA UR CULT: NO GROWTH

## 2017-10-17 NOTE — PROGRESS NOTES
CHIEF COMPLAINT: ER follow up     HISTORY OF PRESENT ILLNESS: This is a 77-year-old woman who presents for ER follow up.  She went to the ED on 10/16/17 due to shortness of breath. She was started on Augmentin 875 mg twice daily on 10/13/17 in clinic due to URI. Blood work, CXR, EKG, and renal stone study were unremarkable.  SHe was given an albuterol treatment which helped her breathing. Her antibiotics were switched to a Z pack which she started on 10/17/17. She is not better. She has a deep cough and is wheezing at times. She did not sleep last night due to the cough.  She is dizzy at times. The congestion is mostly in her chest.     Heartburn is a little better. She is taking pantoprazole 40 mg daily and ranitidine 300 mg at night.  She is taking dicyclomine 10 mg three times daily as needed which helps somewhat.   Symptoms occur 1-2 times a month.      She continues to have sinus congestion with clear drainage.  She continues to take loratadine 10 mg daily and flonase daily.     She saw Dr Le 1/31/17 and [facility]. Her back is doing a little better. She is now walking without a cane. She has occasional left hip pain. She is taking gabapentin 100 mg 1 capsule three times daily and 3 at bedtime. Dose has been increased due to foot pain. Foot pain is better on higher dose of gabapentin. She takes oxycodone apap 5/325 one tablet up to 5 times daily which keeps her back pain under control. She has to take oxycodone apap when she is active and does things. If she does not go out, she does not have have to take as much oxycodone apap.        She continues to take aspirin 81 mg daily, coreg 25 mg twice daily and lasix 40 mg twice daily for her diastolic dysfunction and hypertension. She denies lightheadedness. No chest pain or shortness of breath. She is now taking atorvastatin 40 mg once daily for her cholesterol.      She is taking lantus 60 units at night and Humalog 23  "units with meals. Blood sugars have been fine - 100 - 110. No hypoglycemia.       PAST MEDICAL HISTORY:   1. Diabetes mellitus   2. Hypertension   3. Hyperlipidemia   4. Left heart catheterization January 2007 which revealed luminal irregularities in the LAD, left circumflex and right coronary artery. She had diastolic dysfunction and patent renal arteries.   5. Sleep apnea   6. Obesity.   7. Nephrolithiasis status post lithotripsy.   8. Reflux - had nonerosive gastropathy on EGD September 2007. Gastritis on EGD 9/2010  9. Hidradenitis suppurativa.   10. History of diverticulitis with a hospitalized October 2007.   11. Migraines   12. Obesity.   13. 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    14. Total hysterectomy in 1969.   15. Cholecystectomy was done at that time as well.   16. 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   17. Colon polyps on colonscopy 11/2010 - due 2013   18. Hospitalization 12/10 for e coli sepsis due to left ureteral stone with left nephrostomy tube in [place], [place]   19. Left knee replacement 9/2012      MEDICATIONS and ALLERGIES: Updated on EPIC.     PHYSICAL EXAMINATION:    /64 (BP Location: Right arm, Patient Position: Sitting, BP Method: Large (Manual))   Pulse 68   Ht 5' 8" (1.727 m)   Wt 96.9 kg (213 lb 11.2 oz)   SpO2 95%   BMI 32.49 kg/m²     GENERAL: She is alert, oriented, no apparent distress. Affect within   normal limits.  Conjunctiva anicteric. . Oropharynx clear.   NECK: Supple.   Respiratory: Effort normal. Lungs clear  HEART: Regular rate and rhythm without murmurs, gallops or rubs.   No lower extremity edema.     ABDOMEN: soft, non distended,  bowel sounds present, no hepatosplenomgaly.     ALbuterol neb given in the office and she felt better     ASSESSMENT AND PLAN:   1. Acute bronchitis with reactive airways disease - albuterol nebs three times daily. Kenalog 40 mg IM today. Continue " Z pack. Watch sugars  2. Gerd -  pantoprazole to 40 mg daily and  ranitidine 300 mg nightly  3. Diabetes- A1C today  4. Hypertension - stable.    5. Hyperlipidemia - on lipitor  6. Allergic rhinitis - stable  7. Diastolic congestive heart failure - stable.    8. CRI -labs  9. Obesity - discussed diet, exercise and weight loss  10. CAD -risk factor modification  11. Aortic atherosclerosis and possible mesenteric artery stenosis- risk factor modification  12. Tortuous aorta - HTN controlled  13. Colon polyps - Colonoscopy in 8/2013 - 2 polyps. Flex sig 11/2013 - mild granular mucosa. Recommended colonoscopy in 8/2018. Attempted colonoscopy 11/2016 du to diarrhea which has resolved. She had poor prep with hyperplastic rectal biopsy.   MMG 11/16  Prevnar 10/15 and flu shot 11/16  I will see her back 2 weeks, sooner if problems

## 2017-10-18 ENCOUNTER — OFFICE VISIT (OUTPATIENT)
Dept: INTERNAL MEDICINE | Facility: CLINIC | Age: 77
End: 2017-10-18
Payer: MEDICARE

## 2017-10-18 VITALS
HEART RATE: 68 BPM | HEIGHT: 68 IN | SYSTOLIC BLOOD PRESSURE: 104 MMHG | DIASTOLIC BLOOD PRESSURE: 64 MMHG | OXYGEN SATURATION: 95 % | BODY MASS INDEX: 32.39 KG/M2 | WEIGHT: 213.69 LBS

## 2017-10-18 DIAGNOSIS — G89.29 OTHER CHRONIC BACK PAIN: Chronic | ICD-10-CM

## 2017-10-18 DIAGNOSIS — J20.9 ACUTE BRONCHITIS, UNSPECIFIED ORGANISM: ICD-10-CM

## 2017-10-18 DIAGNOSIS — J45.909 ASTHMA, CURRENTLY ACTIVE: ICD-10-CM

## 2017-10-18 DIAGNOSIS — J45.909 MILD REACTIVE AIRWAYS DISEASE, UNSPECIFIED WHETHER PERSISTENT: ICD-10-CM

## 2017-10-18 DIAGNOSIS — I10 ESSENTIAL HYPERTENSION: Chronic | ICD-10-CM

## 2017-10-18 DIAGNOSIS — Z79.4 TYPE 2 DIABETES MELLITUS WITH DIABETIC POLYNEUROPATHY, WITH LONG-TERM CURRENT USE OF INSULIN: ICD-10-CM

## 2017-10-18 DIAGNOSIS — M54.9 OTHER CHRONIC BACK PAIN: Chronic | ICD-10-CM

## 2017-10-18 DIAGNOSIS — E78.5 HYPERLIPIDEMIA, UNSPECIFIED HYPERLIPIDEMIA TYPE: ICD-10-CM

## 2017-10-18 DIAGNOSIS — E11.42 TYPE 2 DIABETES MELLITUS WITH DIABETIC POLYNEUROPATHY, WITH LONG-TERM CURRENT USE OF INSULIN: ICD-10-CM

## 2017-10-18 DIAGNOSIS — J45.909 ASTHMA, CURRENTLY ACTIVE: Primary | ICD-10-CM

## 2017-10-18 PROCEDURE — 99214 OFFICE O/P EST MOD 30 MIN: CPT | Mod: 25,S$GLB,, | Performed by: INTERNAL MEDICINE

## 2017-10-18 PROCEDURE — 99499 UNLISTED E&M SERVICE: CPT | Mod: S$GLB,,, | Performed by: INTERNAL MEDICINE

## 2017-10-18 PROCEDURE — 99999 PR PBB SHADOW E&M-EST. PATIENT-LVL III: CPT | Mod: PBBFAC,,, | Performed by: INTERNAL MEDICINE

## 2017-10-18 PROCEDURE — 94640 AIRWAY INHALATION TREATMENT: CPT | Mod: 59,S$GLB,, | Performed by: INTERNAL MEDICINE

## 2017-10-18 PROCEDURE — 96372 THER/PROPH/DIAG INJ SC/IM: CPT | Mod: S$GLB,,, | Performed by: INTERNAL MEDICINE

## 2017-10-18 RX ORDER — OXYCODONE AND ACETAMINOPHEN 10; 325 MG/1; MG/1
1 TABLET ORAL
Qty: 150 TABLET | Refills: 0 | Status: SHIPPED | OUTPATIENT
Start: 2017-12-18 | End: 2017-12-22

## 2017-10-18 RX ORDER — OXYCODONE AND ACETAMINOPHEN 10; 325 MG/1; MG/1
TABLET ORAL
Qty: 150 TABLET | Refills: 0 | Status: SHIPPED | OUTPATIENT
Start: 2017-10-18 | End: 2018-01-08 | Stop reason: SDUPTHER

## 2017-10-18 RX ORDER — TRIAMCINOLONE ACETONIDE 40 MG/ML
40 INJECTION, SUSPENSION INTRA-ARTICULAR; INTRAMUSCULAR
Status: COMPLETED | OUTPATIENT
Start: 2017-10-18 | End: 2017-10-18

## 2017-10-18 RX ORDER — ALBUTEROL SULFATE 0.83 MG/ML
2.5 SOLUTION RESPIRATORY (INHALATION) EVERY 6 HOURS PRN
Qty: 90 ML | Refills: 6 | Status: SHIPPED | OUTPATIENT
Start: 2017-10-18 | End: 2018-09-06 | Stop reason: SDUPTHER

## 2017-10-18 RX ORDER — ALBUTEROL SULFATE 0.83 MG/ML
SOLUTION RESPIRATORY (INHALATION)
Qty: 1650 ML | Refills: 6 | OUTPATIENT
Start: 2017-10-18

## 2017-10-18 RX ORDER — AMOXICILLIN AND CLAVULANATE POTASSIUM 875; 125 MG/1; MG/1
TABLET, FILM COATED ORAL
Qty: 14 TABLET | Refills: 0 | OUTPATIENT
Start: 2017-10-18

## 2017-10-18 RX ORDER — PROMETHAZINE HYDROCHLORIDE 6.25 MG/5ML
SYRUP ORAL
Qty: 118 ML | Refills: 0 | OUTPATIENT
Start: 2017-10-18

## 2017-10-18 RX ORDER — ALBUTEROL SULFATE 0.83 MG/ML
2.5 SOLUTION RESPIRATORY (INHALATION)
Status: COMPLETED | OUTPATIENT
Start: 2017-10-18 | End: 2017-10-18

## 2017-10-18 RX ORDER — OXYCODONE AND ACETAMINOPHEN 10; 325 MG/1; MG/1
1 TABLET ORAL
Qty: 150 TABLET | Refills: 0 | Status: SHIPPED | OUTPATIENT
Start: 2017-11-18 | End: 2018-01-19 | Stop reason: SDUPTHER

## 2017-10-18 RX ADMIN — TRIAMCINOLONE ACETONIDE 40 MG: 40 INJECTION, SUSPENSION INTRA-ARTICULAR; INTRAMUSCULAR at 02:10

## 2017-10-18 RX ADMIN — ALBUTEROL SULFATE 2.5 MG: 0.83 SOLUTION RESPIRATORY (INHALATION) at 02:10

## 2017-10-18 NOTE — PROGRESS NOTES
Pt two identifiers. Pt asked to stay within room 15 minutes post injection. Pt agreed. tolerated albuterol treatment well.

## 2017-10-21 LAB
BACTERIA BLD CULT: NORMAL
BACTERIA BLD CULT: NORMAL

## 2017-10-25 ENCOUNTER — TELEPHONE (OUTPATIENT)
Dept: INTERNAL MEDICINE | Facility: CLINIC | Age: 77
End: 2017-10-25

## 2017-10-25 RX ORDER — PREDNISONE 10 MG/1
TABLET ORAL
Qty: 18 TABLET | Refills: 0 | Status: SHIPPED | OUTPATIENT
Start: 2017-10-25 | End: 2017-12-08

## 2017-10-25 NOTE — TELEPHONE ENCOUNTER
Prednisone sent to pharmacy    Larry - she has not gotten her nebulizer machine. Can you look into this?  thanks

## 2017-10-25 NOTE — TELEPHONE ENCOUNTER
Pt states that she is still coughing and having nasal congestion since last visit. Pt wants to know what else can be done for this. Please advise

## 2017-10-25 NOTE — TELEPHONE ENCOUNTER
Pt states the nebulizer will take 7-10 days to come in. Pt states the coughing is worse at night and she is having a hard time sleeping. Advised pt of rx being called in to pharmacy. Pt verbalized understanding

## 2017-10-25 NOTE — TELEPHONE ENCOUNTER
----- Message from Prisca Jones sent at 10/25/2017  1:51 PM CDT -----  Contact: self [phone]  Patient is calling because she's not getting any better and was told to call. Please call her back and advise    Thank you

## 2017-11-08 DIAGNOSIS — R06.02 SHORTNESS OF BREATH: ICD-10-CM

## 2017-11-08 RX ORDER — ALBUTEROL SULFATE 90 UG/1
AEROSOL, METERED RESPIRATORY (INHALATION)
Qty: 18 G | Refills: 1 | Status: SHIPPED | OUTPATIENT
Start: 2017-11-08 | End: 2018-06-22 | Stop reason: SDUPTHER

## 2017-11-30 RX ORDER — CARVEDILOL 25 MG/1
TABLET ORAL
Qty: 180 TABLET | Refills: 3 | Status: SHIPPED | OUTPATIENT
Start: 2017-11-30 | End: 2018-09-24 | Stop reason: SDUPTHER

## 2017-12-06 ENCOUNTER — TELEPHONE (OUTPATIENT)
Dept: INTERNAL MEDICINE | Facility: CLINIC | Age: 77
End: 2017-12-06

## 2017-12-06 NOTE — TELEPHONE ENCOUNTER
----- Message from Jennifer Cardenas sent at 12/6/2017 11:10 AM CST -----  Contact: self/ [phone]  Patient would like to get medical advice.  Symptoms (please be specific): rash under breast,  Itches, lil bumps, raw?  How long has patient had these symptoms:  3 weeks   Pharmacy name and phone #:    Any drug allergies:    Comments:

## 2017-12-06 NOTE — TELEPHONE ENCOUNTER
Does it hurt? Itch? Both breasts or one breast? COuld it be yeast? Has she put any cream on it.  book her to see me on Friday if she needs to come in

## 2017-12-07 NOTE — TELEPHONE ENCOUNTER
"Spoke with pt, pt states "the rash is located underneath her breast and down her stomach by her private area." she does have complaints of itchiness, pain, and the color is whitish. Pt has been applying cream but could not give me the name. Offered apt to see PCP on Friday at 11:30. Pt agreed.  "

## 2017-12-08 ENCOUNTER — LAB VISIT (OUTPATIENT)
Dept: LAB | Facility: HOSPITAL | Age: 77
End: 2017-12-08
Attending: INTERNAL MEDICINE
Payer: MEDICARE

## 2017-12-08 ENCOUNTER — OFFICE VISIT (OUTPATIENT)
Dept: INTERNAL MEDICINE | Facility: CLINIC | Age: 77
End: 2017-12-08
Payer: MEDICARE

## 2017-12-08 ENCOUNTER — IMMUNIZATION (OUTPATIENT)
Dept: INTERNAL MEDICINE | Facility: CLINIC | Age: 77
End: 2017-12-08
Payer: MEDICARE

## 2017-12-08 VITALS
WEIGHT: 228.81 LBS | HEART RATE: 80 BPM | BODY MASS INDEX: 34.68 KG/M2 | SYSTOLIC BLOOD PRESSURE: 122 MMHG | HEIGHT: 68 IN | DIASTOLIC BLOOD PRESSURE: 60 MMHG | OXYGEN SATURATION: 95 %

## 2017-12-08 DIAGNOSIS — N18.30 TYPE 2 DIABETES MELLITUS WITH STAGE 3 CHRONIC KIDNEY DISEASE, WITH LONG-TERM CURRENT USE OF INSULIN: ICD-10-CM

## 2017-12-08 DIAGNOSIS — I10 ESSENTIAL HYPERTENSION: Chronic | ICD-10-CM

## 2017-12-08 DIAGNOSIS — Z12.31 SCREENING MAMMOGRAM, ENCOUNTER FOR: ICD-10-CM

## 2017-12-08 DIAGNOSIS — E11.22 TYPE 2 DIABETES MELLITUS WITH STAGE 3 CHRONIC KIDNEY DISEASE, WITH LONG-TERM CURRENT USE OF INSULIN: ICD-10-CM

## 2017-12-08 DIAGNOSIS — E11.42 DIABETIC POLYNEUROPATHY ASSOCIATED WITH TYPE 2 DIABETES MELLITUS: ICD-10-CM

## 2017-12-08 DIAGNOSIS — H57.9 EYE EXAM ABNORMAL: ICD-10-CM

## 2017-12-08 DIAGNOSIS — K21.9 GASTROESOPHAGEAL REFLUX DISEASE WITHOUT ESOPHAGITIS: ICD-10-CM

## 2017-12-08 DIAGNOSIS — Z79.4 TYPE 2 DIABETES MELLITUS WITH STAGE 3 CHRONIC KIDNEY DISEASE, WITH LONG-TERM CURRENT USE OF INSULIN: ICD-10-CM

## 2017-12-08 DIAGNOSIS — E78.5 HYPERLIPIDEMIA, UNSPECIFIED HYPERLIPIDEMIA TYPE: ICD-10-CM

## 2017-12-08 DIAGNOSIS — B37.2 YEAST INFECTION OF THE SKIN: Primary | ICD-10-CM

## 2017-12-08 LAB
ALBUMIN SERPL BCP-MCNC: 3 G/DL
ALP SERPL-CCNC: 98 U/L
ALT SERPL W/O P-5'-P-CCNC: 11 U/L
ANION GAP SERPL CALC-SCNC: 7 MMOL/L
AST SERPL-CCNC: 18 U/L
BASOPHILS # BLD AUTO: 0.07 K/UL
BASOPHILS NFR BLD: 0.6 %
BILIRUB SERPL-MCNC: 0.4 MG/DL
BUN SERPL-MCNC: 11 MG/DL
CALCIUM SERPL-MCNC: 9.1 MG/DL
CHLORIDE SERPL-SCNC: 100 MMOL/L
CO2 SERPL-SCNC: 33 MMOL/L
CREAT SERPL-MCNC: 1.3 MG/DL
DIFFERENTIAL METHOD: ABNORMAL
EOSINOPHIL # BLD AUTO: 0.2 K/UL
EOSINOPHIL NFR BLD: 2.1 %
ERYTHROCYTE [DISTWIDTH] IN BLOOD BY AUTOMATED COUNT: 14.6 %
EST. GFR  (AFRICAN AMERICAN): 45.7 ML/MIN/1.73 M^2
EST. GFR  (NON AFRICAN AMERICAN): 39.7 ML/MIN/1.73 M^2
ESTIMATED AVG GLUCOSE: 177 MG/DL
GLUCOSE SERPL-MCNC: 290 MG/DL
HBA1C MFR BLD HPLC: 7.8 %
HCT VFR BLD AUTO: 36.4 %
HGB BLD-MCNC: 11.3 G/DL
LYMPHOCYTES # BLD AUTO: 1.9 K/UL
LYMPHOCYTES NFR BLD: 17.3 %
MCH RBC QN AUTO: 27.4 PG
MCHC RBC AUTO-ENTMCNC: 31 G/DL
MCV RBC AUTO: 88 FL
MONOCYTES # BLD AUTO: 0.8 K/UL
MONOCYTES NFR BLD: 7.7 %
NEUTROPHILS # BLD AUTO: 7.9 K/UL
NEUTROPHILS NFR BLD: 71.8 %
NRBC BLD-RTO: 0 /100 WBC
PLATELET # BLD AUTO: 287 K/UL
PMV BLD AUTO: 10.2 FL
POTASSIUM SERPL-SCNC: 3.7 MMOL/L
PROT SERPL-MCNC: 7.5 G/DL
RBC # BLD AUTO: 4.13 M/UL
SODIUM SERPL-SCNC: 140 MMOL/L
WBC # BLD AUTO: 10.95 K/UL

## 2017-12-08 PROCEDURE — 99213 OFFICE O/P EST LOW 20 MIN: CPT | Mod: PBBFAC,25 | Performed by: INTERNAL MEDICINE

## 2017-12-08 PROCEDURE — 99499 UNLISTED E&M SERVICE: CPT | Mod: S$GLB,,, | Performed by: INTERNAL MEDICINE

## 2017-12-08 PROCEDURE — 83036 HEMOGLOBIN GLYCOSYLATED A1C: CPT

## 2017-12-08 PROCEDURE — 99999 PR PBB SHADOW E&M-EST. PATIENT-LVL III: CPT | Mod: PBBFAC,,, | Performed by: INTERNAL MEDICINE

## 2017-12-08 PROCEDURE — 90662 IIV NO PRSV INCREASED AG IM: CPT | Mod: PBBFAC

## 2017-12-08 PROCEDURE — 36415 COLL VENOUS BLD VENIPUNCTURE: CPT

## 2017-12-08 PROCEDURE — 80053 COMPREHEN METABOLIC PANEL: CPT

## 2017-12-08 PROCEDURE — 85025 COMPLETE CBC W/AUTO DIFF WBC: CPT

## 2017-12-08 PROCEDURE — 99214 OFFICE O/P EST MOD 30 MIN: CPT | Mod: S$GLB,,, | Performed by: INTERNAL MEDICINE

## 2017-12-08 RX ORDER — NYSTATIN 100000 U/G
CREAM TOPICAL 2 TIMES DAILY
Qty: 120 G | Refills: 3 | Status: SHIPPED | OUTPATIENT
Start: 2017-12-08 | End: 2018-06-05

## 2017-12-08 RX ORDER — FLUCONAZOLE 200 MG/1
200 TABLET ORAL EVERY OTHER DAY
Qty: 7 TABLET | Refills: 0 | Status: SHIPPED | OUTPATIENT
Start: 2017-12-08 | End: 2018-01-31 | Stop reason: ALTCHOICE

## 2017-12-08 NOTE — PROGRESS NOTES
CHIEF COMPLAINT: Rash under breast and in groin     HISTORY OF PRESENT ILLNESS: This is a 77-year-old woman who presents due to rash under her breasts and in her lower abdominal skin fold for the last m onth. Rash itches and hurts. She has tried some over the counter monistat and vaseline without relief. SHe has been on a lot of antibiotics for a bronchitis.      She still has a cough with thick white mucous.  No mucinex.  She is taking promethazine which helps a little. She is doing the albuterol neb very sporadically (once every 2 weeks).    Heartburn is a little better. She is taking pantoprazole 40 mg daily and ranitidine 300 mg at night.  She is taking dicyclomine 10 mg three times daily as needed which helps somewhat.   Symptoms occur 1-2 times a month.      She continues to have sinus congestion with clear drainage.  She continues to take loratadine 10 mg daily and flonase daily.     She saw Dr Le 1/31/17 and [facility]. Her back is doing a little better. She is now walking without a cane. She has occasional left hip pain. She is taking gabapentin 100 mg 1 capsule three times daily and 3 at bedtime. Dose has been increased due to foot pain. Foot pain is better on higher dose of gabapentin. She takes oxycodone apap 5/325 one tablet up to 5 times daily which keeps her back pain under control. She has to take oxycodone apap when she is active and does things. If she does not go out, she does not have have to take as much oxycodone apap.        She continues to take aspirin 81 mg daily, coreg 25 mg twice daily and lasix 40 mg twice daily for her diastolic dysfunction and hypertension. She denies lightheadedness. No chest pain or shortness of breath. She is now taking atorvastatin 40 mg once daily for her cholesterol.      She is taking lantus 60 units at night and Humalog 23 units with meals. Blood sugars have been fine - 100 - 110. No hypoglycemia.       PAST MEDICAL  "HISTORY:   1. Diabetes mellitus   2. Hypertension   3. Hyperlipidemia   4. Left heart catheterization January 2007 which revealed luminal irregularities in the LAD, left circumflex and right coronary artery. She had diastolic dysfunction and patent renal arteries.   5. Sleep apnea   6. Obesity.   7. Nephrolithiasis status post lithotripsy.   8. Reflux - had nonerosive gastropathy on EGD September 2007. Gastritis on EGD 9/2010  9. Hidradenitis suppurativa.   10. History of diverticulitis with a hospitalized October 2007.   11. Migraines   12. Obesity.   13. 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    14. Total hysterectomy in 1969.   15. Cholecystectomy was done at that time as well.   16. 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   17. Colon polyps on colonscopy 11/2010 - due 2013   18. Hospitalization 12/10 for e coli sepsis due to left ureteral stone with left nephrostomy tube in [place], [place]   19. Left knee replacement 9/2012      MEDICATIONS and ALLERGIES: Updated on EPIC.     PHYSICAL EXAMINATION:     /60   Pulse 80   Ht 5' 8" (1.727 m)   Wt 103.8 kg (228 lb 12.8 oz)   SpO2 95%   BMI 34.79 kg/m²     GENERAL: She is alert, oriented, no apparent distress. Affect within   normal limits.  Conjunctiva anicteric. . Oropharynx clear.   NECK: Supple.   Respiratory: Effort normal. Lungs clear  HEART: Regular rate and rhythm without murmurs, gallops or rubs.   No lower extremity edema.     ABDOMEN: soft, non distended,  bowel sounds present, no hepatosplenomgaly.  Redenned rash under breasts and in inguinal area.      ASSESSMENT AND PLAN:   1. Candidasis - fluconazole 200 mg every other day for 2 weeks. Nystatin cream twice daily  2. Gerd -  pantoprazole to 40 mg daily and  ranitidine 300 mg nightly  3. Diabetes- stablke  4. Hypertension - stable.    5. Hyperlipidemia - on lipitor  6. Allergic rhinitis - stable  7. Diastolic " congestive heart failure - stable.    8. CRI -stable  9. Obesity - discussed diet, exercise and weight loss  10. CAD -risk factor modification  11. Aortic atherosclerosis and possible mesenteric artery stenosis- risk factor modification  12. Tortuous aorta - HTN controlled  13. Colon polyps - Colonoscopy in 8/2013 - 2 polyps. Flex sig 11/2013 - mild granular mucosa. Recommended colonoscopy in 8/2018. Attempted colonoscopy 11/2016 du to diarrhea which has resolved. She had poor prep with hyperplastic rectal biopsy.   MMG 11/16 - schedyked  Prevnar 10/15 and flu shot 11/16  I will see her back 2 weeks, sooner if problems

## 2017-12-12 DIAGNOSIS — E11.42 TYPE 2 DIABETES MELLITUS WITH DIABETIC POLYNEUROPATHY, WITH LONG-TERM CURRENT USE OF INSULIN: ICD-10-CM

## 2017-12-12 DIAGNOSIS — Z79.4 TYPE 2 DIABETES MELLITUS WITH DIABETIC POLYNEUROPATHY, WITH LONG-TERM CURRENT USE OF INSULIN: ICD-10-CM

## 2017-12-13 RX ORDER — INSULIN GLARGINE 100 [IU]/ML
60 INJECTION, SOLUTION SUBCUTANEOUS NIGHTLY
Qty: 4 BOX | Refills: 3 | Status: SHIPPED | OUTPATIENT
Start: 2017-12-13 | End: 2018-12-11 | Stop reason: SDUPTHER

## 2017-12-17 ENCOUNTER — TELEPHONE (OUTPATIENT)
Dept: INTERNAL MEDICINE | Facility: CLINIC | Age: 77
End: 2017-12-17

## 2017-12-17 DIAGNOSIS — E55.9 VITAMIN D DEFICIENCY DISEASE: ICD-10-CM

## 2017-12-17 DIAGNOSIS — D64.9 ANEMIA, UNSPECIFIED TYPE: Primary | ICD-10-CM

## 2017-12-17 DIAGNOSIS — E53.8 VITAMIN B12 DEFICIENCY: ICD-10-CM

## 2017-12-17 NOTE — TELEPHONE ENCOUNTER
Please notify pt that her blood work reveals that she is mildly anemic.  Dr YANEZ wants her to have blood work done at [facility] on Friday 12/22 when she comes for her eye apt (nonfasting)

## 2017-12-19 ENCOUNTER — TELEPHONE (OUTPATIENT)
Dept: INTERNAL MEDICINE | Facility: CLINIC | Age: 77
End: 2017-12-19

## 2017-12-19 NOTE — TELEPHONE ENCOUNTER
----- Message from Amina Swartz sent at 12/19/2017  2:57 PM CST -----  Contact: Isatu/Chapin/[phone]  Isatu called in regards needing diagnosis code for rx oxycodone-acetaminophen (PERCOCET)  mg per tablet due to the amount. Please call and advise.       Thank you!!!

## 2017-12-22 ENCOUNTER — INITIAL CONSULT (OUTPATIENT)
Dept: OPTOMETRY | Facility: CLINIC | Age: 77
End: 2017-12-22
Payer: MEDICARE

## 2017-12-22 ENCOUNTER — OFFICE VISIT (OUTPATIENT)
Dept: ENDOCRINOLOGY | Facility: CLINIC | Age: 77
End: 2017-12-22
Payer: MEDICARE

## 2017-12-22 VITALS
HEART RATE: 72 BPM | BODY MASS INDEX: 34.17 KG/M2 | HEIGHT: 68 IN | WEIGHT: 225.5 LBS | DIASTOLIC BLOOD PRESSURE: 78 MMHG | SYSTOLIC BLOOD PRESSURE: 126 MMHG

## 2017-12-22 DIAGNOSIS — I10 ESSENTIAL HYPERTENSION: Chronic | ICD-10-CM

## 2017-12-22 DIAGNOSIS — H26.9 CORTICAL CATARACT OF BOTH EYES: ICD-10-CM

## 2017-12-22 DIAGNOSIS — E66.9 OBESITY (BMI 30.0-34.9): ICD-10-CM

## 2017-12-22 DIAGNOSIS — H52.03 HYPEROPIA, BILATERAL: ICD-10-CM

## 2017-12-22 DIAGNOSIS — H52.4 PRESBYOPIA: ICD-10-CM

## 2017-12-22 DIAGNOSIS — I25.119 CORONARY ARTERY DISEASE INVOLVING NATIVE CORONARY ARTERY OF NATIVE HEART WITH ANGINA PECTORIS: ICD-10-CM

## 2017-12-22 DIAGNOSIS — E78.5 HYPERLIPIDEMIA, UNSPECIFIED HYPERLIPIDEMIA TYPE: ICD-10-CM

## 2017-12-22 DIAGNOSIS — Z79.4 TYPE 2 DIABETES MELLITUS WITH STAGE 3 CHRONIC KIDNEY DISEASE, WITH LONG-TERM CURRENT USE OF INSULIN: ICD-10-CM

## 2017-12-22 DIAGNOSIS — G47.33 OSA ON CPAP: Chronic | ICD-10-CM

## 2017-12-22 DIAGNOSIS — N18.30 TYPE 2 DIABETES MELLITUS WITH STAGE 3 CHRONIC KIDNEY DISEASE, WITHOUT LONG-TERM CURRENT USE OF INSULIN: Primary | ICD-10-CM

## 2017-12-22 DIAGNOSIS — H10.13 CONJUNCTIVITIS, ALLERGIC, BILATERAL: ICD-10-CM

## 2017-12-22 DIAGNOSIS — E11.22 TYPE 2 DIABETES MELLITUS WITH STAGE 3 CHRONIC KIDNEY DISEASE, WITHOUT LONG-TERM CURRENT USE OF INSULIN: Primary | ICD-10-CM

## 2017-12-22 DIAGNOSIS — I10 ESSENTIAL HYPERTENSION: ICD-10-CM

## 2017-12-22 DIAGNOSIS — E11.22 TYPE 2 DIABETES MELLITUS WITH STAGE 3 CHRONIC KIDNEY DISEASE, WITH LONG-TERM CURRENT USE OF INSULIN: ICD-10-CM

## 2017-12-22 DIAGNOSIS — E11.42 DIABETIC POLYNEUROPATHY ASSOCIATED WITH TYPE 2 DIABETES MELLITUS: Primary | ICD-10-CM

## 2017-12-22 DIAGNOSIS — N18.30 TYPE 2 DIABETES MELLITUS WITH STAGE 3 CHRONIC KIDNEY DISEASE, WITH LONG-TERM CURRENT USE OF INSULIN: ICD-10-CM

## 2017-12-22 DIAGNOSIS — H04.123 DRY EYE SYNDROME, BILATERAL: ICD-10-CM

## 2017-12-22 DIAGNOSIS — E11.9 TYPE 2 DIABETES MELLITUS WITHOUT OPHTHALMIC MANIFESTATIONS: ICD-10-CM

## 2017-12-22 PROCEDURE — 99213 OFFICE O/P EST LOW 20 MIN: CPT | Mod: PBBFAC | Performed by: OPTOMETRIST

## 2017-12-22 PROCEDURE — 99999 PR PBB SHADOW E&M-EST. PATIENT-LVL IV: CPT | Mod: PBBFAC,,, | Performed by: NURSE PRACTITIONER

## 2017-12-22 PROCEDURE — 99999 PR PBB SHADOW E&M-EST. PATIENT-LVL III: CPT | Mod: PBBFAC,,, | Performed by: OPTOMETRIST

## 2017-12-22 PROCEDURE — 92014 COMPRE OPH EXAM EST PT 1/>: CPT | Mod: S$GLB,,, | Performed by: OPTOMETRIST

## 2017-12-22 PROCEDURE — 99499 UNLISTED E&M SERVICE: CPT | Mod: S$GLB,,, | Performed by: OPTOMETRIST

## 2017-12-22 PROCEDURE — 99214 OFFICE O/P EST MOD 30 MIN: CPT | Mod: S$GLB,,, | Performed by: NURSE PRACTITIONER

## 2017-12-22 NOTE — LETTER
December 22, 2017      Jeannine Arriaga MD  [address]  [place] [address]           [facility]ometry  [address]  [place] [address]  Phone: [phone]  Fax: [phone]          Patient: Henrietta Villasenor   MR Number: [id number]   YOB: 1940   Date of Visit: 12/22/2017       Dear Dr. Jeannine Arriaga:    Thank you for referring Henrietta Villasenor to me for evaluation. Attached you will find relevant portions of my assessment and plan of care.    If you have questions, please do not hesitate to call me. I look forward to following Henrietta Villasenor along with you.    Sincerely,    Georgina Clemens, OD    Enclosure  CC:  No Recipients    If you would like to receive this communication electronically, please contact [email or web address] or [phone] to request more information on [employer] Link access.    For providers and/or their staff who would like to refer a patient to [facility], please contact us through our one-stop-shop provider referral line, [facility], at [phone].    If you feel you have received this communication in error or would no longer like to receive these types of communications, please e-mail [email or web address]

## 2017-12-22 NOTE — PROGRESS NOTES
"CC: Management of T2DM and review of chronic medical conditions as listed in the visit diagnosis.      HPI: Ms. Henrietta Villasenor is a 77 y.o.  female who was diagnosed with Type 2 DM in the late 1990s. Started on orals, but has been on insulin since 2000. No hospitalizations for DM.  [facility].     Last seen in September 2017. No changes at that time.    Pending left eye cataract removal next month.     A1C has trended up.     Takes insulin 3x/day; takes insulin 4x/day. Readings reported are 80s-200s; reports one episode with a reading of 58 one week ago. No additional hypoglycemic episodes, but reports readings in 70s-80s fasting that occur frequently fasting and sometimes during the day. Difficult to determine a pattern without BG logs available to review.     "Very seldom I miss any doses of insulin"    Denies snacking between meals. Drinks diet drinks, sometimes lemonade with sugar free sweetener.     Times prandial insulin 5-10 minutes before or after meals.     No current exercise.     CURRENT DIABETIC MEDS: Lantus 60 units in the evening; Novolog 23 units with each meal (uses correction scale 150/25)  Uses Vials or Pens: Pens  Type of Glucose Meter: Accu-Chek Rosita    Last Eye Exam: December 2017  Last Podiatry Exam: April 2017    Last DM Education Attended: Previous [facility] patient (last seen January 2016)    REVIEW OF SYSTEMS  General: no weakness or fatigue; stable weight.   Eyes: (+) chronic blurry vision at night; no eye pain; (+) bilateral cataracts (has not had surgery)   Cardiac: no chest pain or palpitations.   Respiratory: no cough; occasional dyspnea.   GI: (+) chronic abdominal pain; + chronic nausea  (has been evaluated for abdominal pain and had imaging studies done for this)  Skin: no rashes or insulin injection site reactions.   Neuro: reports chronic sharp shooting pains in feet; right side of mouth with chronic drooping  Endocrine: no polyuria, polydipsia, " "polyphagia.     Vital Signs  /78   Pulse 72   Ht 5' 8" (1.727 m)   Wt 102.3 kg (225 lb 8 oz)   BMI 34.29 kg/m²     Hemoglobin A1C   Date Value Ref Range Status   12/08/2017 7.8 (H) 4.0 - 5.6 % Final     Comment:     According to ADA guidelines, hemoglobin A1c <7.0% represents  optimal control in non-pregnant diabetic patients. Different  metrics may apply to specific patient populations.   Standards of Medical Care in Diabetes-2016.  For the purpose of screening for the presence of diabetes:  <5.7%     Consistent with the absence of diabetes  5.7-6.4%  Consistent with increasing risk for diabetes   (prediabetes)  >or=6.5%  Consistent with diabetes  Currently, no consensus exists for use of hemoglobin A1c  for diagnosis of diabetes for children.  This Hemoglobin A1c assay has significant interference with fetal   hemoglobin   (HbF). The results are invalid for patients with abnormal amounts of   HbF,   including those with known Hereditary Persistence   of Fetal Hemoglobin. Heterozygous hemoglobin variants (HbAS, HbAC,   HbAD, HbAE, HbA2) do not significantly interfere with this assay;   however, presence of multiple variants in a sample may impact the %   interference.     08/18/2017 7.3 (H) 4.0 - 5.6 % Final     Comment:     According to ADA guidelines, hemoglobin A1c <7.0% represents  optimal control in non-pregnant diabetic patients. Different  metrics may apply to specific patient populations.   Standards of Medical Care in Diabetes-2016.  For the purpose of screening for the presence of diabetes:  <5.7%     Consistent with the absence of diabetes  5.7-6.4%  Consistent with increasing risk for diabetes   (prediabetes)  >or=6.5%  Consistent with diabetes  Currently, no consensus exists for use of hemoglobin A1c  for diagnosis of diabetes for children.  This Hemoglobin A1c assay has significant interference with fetal   hemoglobin   (HbF). The results are invalid for patients with abnormal amounts of "   HbF,   including those with known Hereditary Persistence   of Fetal Hemoglobin. Heterozygous hemoglobin variants (HbAS, HbAC,   HbAD, HbAE, HbA2) do not significantly interfere with this assay;   however, presence of multiple variants in a sample may impact the %   interference.     08/18/2017 7.3 (H) 4.0 - 5.6 % Final     Comment:     According to ADA guidelines, hemoglobin A1c <7.0% represents  optimal control in non-pregnant diabetic patients. Different  metrics may apply to specific patient populations.   Standards of Medical Care in Diabetes-2016.  For the purpose of screening for the presence of diabetes:  <5.7%     Consistent with the absence of diabetes  5.7-6.4%  Consistent with increasing risk for diabetes   (prediabetes)  >or=6.5%  Consistent with diabetes  Currently, no consensus exists for use of hemoglobin A1c  for diagnosis of diabetes for children.  This Hemoglobin A1c assay has significant interference with fetal   hemoglobin   (HbF). The results are invalid for patients with abnormal amounts of   HbF,   including those with known Hereditary Persistence   of Fetal Hemoglobin. Heterozygous hemoglobin variants (HbAS, HbAC,   HbAD, HbAE, HbA2) do not significantly interfere with this assay;   however, presence of multiple variants in a sample may impact the %   interference.         Chemistry        Component Value Date/Time     12/08/2017 1216    K 3.7 12/08/2017 1216     12/08/2017 1216    CO2 33 (H) 12/08/2017 1216    BUN 11 12/08/2017 1216    CREATININE 1.3 12/08/2017 1216     (H) 12/08/2017 1216        Component Value Date/Time    CALCIUM 9.1 12/08/2017 1216    ALKPHOS 98 12/08/2017 1216    AST 18 12/08/2017 1216    ALT 11 12/08/2017 1216    BILITOT 0.4 12/08/2017 1216          Lab Results   Component Value Date    CHOL 176 08/18/2017    CHOL 197 11/21/2016    CHOL 263 (H) 12/31/2015     Lab Results   Component Value Date    HDL 46 08/18/2017    HDL 56 11/21/2016    HDL 46  12/31/2015     Lab Results   Component Value Date    LDLCALC 112.8 08/18/2017    LDLCALC 122.2 11/21/2016    LDLCALC 189.4 (H) 12/31/2015     Lab Results   Component Value Date    TRIG 86 08/18/2017    TRIG 94 11/21/2016    TRIG 138 12/31/2015     Lab Results   Component Value Date    CHOLHDL 26.1 08/18/2017    CHOLHDL 28.4 11/21/2016    CHOLHDL 17.5 (L) 12/31/2015       Lab Results   Component Value Date    TSH 1.596 05/17/2017     Assessment/Plan  Diabetic peripheral neuropathy associated with type 2 diabetes mellitus:   DM uncontrolled, A1C trended up  Unsafe to make adjustments without BG logs to review, especially considering her labile BG readings  Correction scald 150/25  Monitor 4x/day and send logs to review in 2 weeks  On Cymbalta, Gabapentin for neuropathy    Type 2 diabetes mellitus with stage 3 chronic kidney disease  Avoid hypoglycemia  Continue to f/u with Nephrology    Essential hypertension  Stable  Continue current medications (Carvedilol and Furosemide)  Management per Nephrology    Hyperlipidemia  Stable  Continue Atorvastatin    Obesity (BMI 30-39.9)  Body mass index is 34.29 kg/m².   Can worsen insulin resistance, but weight loss can help this  Weight loss encouraged     Coronary artery disease involving native coronary artery of native heart with angina pectoris  Managed by Cardiology.     MARLON on c-pap  Continue C-pap     FOLLOW UP  Return in about 4 months (around 4/22/2018).

## 2017-12-22 NOTE — PROGRESS NOTES
HPI     Patient's age: 77 y.o.    Approximate date of last eye examination:  12/12/16  Name of last eye doctor seen: Dr. Clemens    Pt states that she been having trouble with eyes, can't see that well at   all, even with glasses also eyes are watery, run a lot of water when   reading and see like this colorful ring like in eyes vision sometimes.    Wears glasses? yes      Wears CLs?:  no             Headaches?  no  Eye pain/discomfort?  no                                                                                     Flashes?  no  Floaters?  no  Diplopia/Double vision?  no    Patient's Ocular History:         Any eye surgeries? yes         Family history of eye disease?  no    Significant patient medical history:         1. Diabetes?  yes       If yes, IDDM or NIDDM? IDDM   2. HBP?  yes                ! OTC eyedrops currently using:  Tear drops - OU   ! Prescription eye meds currently using:  None    Hemoglobin A1C       Date                     Value               Ref Range             Status                12/08/2017               7.8 (H)             4.0 - 5.6 %           Final                       08/18/2017               7.3 (H)             4.0 - 5.6 %           Final                       08/18/2017               7.3 (H)             4.0 - 5.6 %           Final                    Last edited by Piper Dwyer MA on 12/22/2017  8:41 AM. (History)            Assessment /Plan     For exam results, see Encounter Report.    Cortical cataract of both eyes OS>OD   Visually significant  -     Ambulatory Referral to Ophthalmology    Type 2 diabetes mellitus with stage 3 chronic kidney disease, without long-term current use of insulin  Type 2 diabetes mellitus without ophthalmic manifestations  Essential hypertension   No retinopathy OU, poor view 2/2 cataracts    Glaucoma suspect   Return for glaucoma tests after cataract surgery    Presbyopia  Hyperopia, bilateral   Rx specs    Dry eye syndrome, bilateral   Use  refresh liquigel BID    Conjunctivitis, allergic, bilateral   Use Zaditor BID OU

## 2017-12-27 ENCOUNTER — TELEPHONE (OUTPATIENT)
Dept: INTERNAL MEDICINE | Facility: CLINIC | Age: 77
End: 2017-12-27

## 2017-12-27 NOTE — TELEPHONE ENCOUNTER
----- Message from Jeannine Arriaga MD sent at 12/26/2017 10:29 PM CST -----  Please notify pt that blood work for her anemia is ok.   NO need for furhter testing at this time  SF

## 2018-01-08 ENCOUNTER — OFFICE VISIT (OUTPATIENT)
Dept: OPHTHALMOLOGY | Facility: CLINIC | Age: 78
End: 2018-01-08
Payer: MEDICARE

## 2018-01-08 DIAGNOSIS — H35.373 EPIRETINAL MEMBRANE, BOTH EYES: ICD-10-CM

## 2018-01-08 DIAGNOSIS — H25.12 NUCLEAR SCLEROTIC CATARACT OF LEFT EYE: Primary | ICD-10-CM

## 2018-01-08 DIAGNOSIS — H25.11 NUCLEAR SCLEROTIC CATARACT OF RIGHT EYE: ICD-10-CM

## 2018-01-08 DIAGNOSIS — H16.223 KERATOCONJUNCTIVITIS SICCA NOT SPECIFIED AS SJOGREN'S, BILATERAL: ICD-10-CM

## 2018-01-08 DIAGNOSIS — H40.003 GLAUCOMA SUSPECT OF BOTH EYES: ICD-10-CM

## 2018-01-08 PROCEDURE — 92136 OPHTHALMIC BIOMETRY: CPT | Mod: LT,S$GLB,, | Performed by: OPHTHALMOLOGY

## 2018-01-08 PROCEDURE — 92134 CPTRZ OPH DX IMG PST SGM RTA: CPT | Mod: S$GLB,,, | Performed by: OPHTHALMOLOGY

## 2018-01-08 PROCEDURE — 99999 PR PBB SHADOW E&M-EST. PATIENT-LVL IV: CPT | Mod: PBBFAC,,, | Performed by: OPHTHALMOLOGY

## 2018-01-08 PROCEDURE — 92014 COMPRE OPH EXAM EST PT 1/>: CPT | Mod: S$GLB,,, | Performed by: OPHTHALMOLOGY

## 2018-01-08 PROCEDURE — 92025 CPTRIZED CORNEAL TOPOGRAPHY: CPT | Mod: S$GLB,,, | Performed by: OPHTHALMOLOGY

## 2018-01-08 RX ORDER — OFLOXACIN 3 MG/ML
1 SOLUTION/ DROPS OPHTHALMIC 3 TIMES DAILY
Qty: 5 ML | Refills: 1 | Status: SHIPPED | OUTPATIENT
Start: 2018-01-08 | End: 2018-02-07

## 2018-01-08 RX ORDER — PREDNISOLONE ACETATE 10 MG/ML
1 SUSPENSION/ DROPS OPHTHALMIC 3 TIMES DAILY
Qty: 5 ML | Refills: 1 | Status: SHIPPED | OUTPATIENT
Start: 2018-01-08 | End: 2018-02-07

## 2018-01-08 NOTE — PROGRESS NOTES
HPI     Referred by Dr Clemens    S/p lid sx OS (pt not sure of the date)    Pt referred by Dr Clemens for cataract eval. Pt states she has blurry [facility]   OU.  Pt states pain OS (4 on scale).  Pt states burning and tearing OS.     Last edited by Jailyn Durham MA on 1/8/2018 10:08 AM. (History)              Assessment /Plan     For exam results, see Encounter Report.    Nuclear sclerotic cataract of left eye  -     IOL Master - OU - Both Eyes  -     Computerized corneal topography    Nuclear sclerotic cataract of right eye    Epiretinal membrane, both eyes  -     Posterior Segment OCT Retina-Both eyes    Keratoconjunctivitis sicca not specified as Sjogren's, bilateral    Glaucoma suspect of both eyes    Other orders  -     prednisoLONE acetate (PRED FORTE) 1 % DrpS; Place 1 drop into the left eye 3 (three) times daily.  Dispense: 5 mL; Refill: 1  -     ofloxacin (OCUFLOX) 0.3 % ophthalmic solution; Place 1 drop into the left eye 3 (three) times daily.  Dispense: 5 mL; Refill: 1      Visually significant nuclear sclerotic cataract   - Interfering with activities of daily living.  Pt desires cataract surgery for [facility] rehabilitation.   - R/B/A discussed and pt agrees to proceed with surgery.   - IOL options discussed according to patient's goals and concomitant ocular pathology; and pt content with monofocal lens.    - Target: plano.    OS first    pcboo 21.5 OS    (OD if pt desires - pcboo 22.5 OD)  *poor dilation - used ring for OS    K sicca  - ATs    GS  - GS w/up with dr. Clemens after cat sx    erm OD>OS  - OCT mac today  - some traction OD, pt donies MMP, observe for now.

## 2018-01-15 ENCOUNTER — TELEPHONE (OUTPATIENT)
Dept: OPHTHALMOLOGY | Facility: CLINIC | Age: 78
End: 2018-01-15

## 2018-01-15 DIAGNOSIS — H25.12 NUCLEAR SCLEROTIC CATARACT OF LEFT EYE: Primary | ICD-10-CM

## 2018-01-19 ENCOUNTER — OFFICE VISIT (OUTPATIENT)
Dept: INTERNAL MEDICINE | Facility: CLINIC | Age: 78
End: 2018-01-19
Payer: MEDICARE

## 2018-01-19 ENCOUNTER — HOSPITAL ENCOUNTER (OUTPATIENT)
Dept: RADIOLOGY | Facility: HOSPITAL | Age: 78
Discharge: HOME | End: 2018-01-19
Attending: INTERNAL MEDICINE
Payer: MEDICARE

## 2018-01-19 VITALS
WEIGHT: 231.19 LBS | DIASTOLIC BLOOD PRESSURE: 78 MMHG | SYSTOLIC BLOOD PRESSURE: 134 MMHG | OXYGEN SATURATION: 96 % | BODY MASS INDEX: 35.04 KG/M2 | HEART RATE: 70 BPM | HEIGHT: 68 IN

## 2018-01-19 DIAGNOSIS — E66.9 OBESITY (BMI 30.0-34.9): ICD-10-CM

## 2018-01-19 DIAGNOSIS — E21.3 HYPERPARATHYROIDISM: ICD-10-CM

## 2018-01-19 DIAGNOSIS — M46.06 SPINAL ENTHESOPATHY OF LUMBAR REGION: ICD-10-CM

## 2018-01-19 DIAGNOSIS — I50.30 DIASTOLIC CONGESTIVE HEART FAILURE, UNSPECIFIED CONGESTIVE HEART FAILURE CHRONICITY: ICD-10-CM

## 2018-01-19 DIAGNOSIS — N18.2 STAGE 2 CHRONIC KIDNEY DISEASE: ICD-10-CM

## 2018-01-19 DIAGNOSIS — E11.42 DIABETIC POLYNEUROPATHY ASSOCIATED WITH TYPE 2 DIABETES MELLITUS: ICD-10-CM

## 2018-01-19 DIAGNOSIS — I70.0 AORTIC ATHEROSCLEROSIS: ICD-10-CM

## 2018-01-19 DIAGNOSIS — I10 ESSENTIAL HYPERTENSION: Chronic | ICD-10-CM

## 2018-01-19 DIAGNOSIS — Z79.4 TYPE 2 DIABETES MELLITUS WITH DIABETIC POLYNEUROPATHY, WITH LONG-TERM CURRENT USE OF INSULIN: ICD-10-CM

## 2018-01-19 DIAGNOSIS — E11.42 TYPE 2 DIABETES MELLITUS WITH DIABETIC POLYNEUROPATHY, WITH LONG-TERM CURRENT USE OF INSULIN: ICD-10-CM

## 2018-01-19 DIAGNOSIS — M54.9 OTHER CHRONIC BACK PAIN: Chronic | ICD-10-CM

## 2018-01-19 DIAGNOSIS — E78.5 HYPERLIPIDEMIA, UNSPECIFIED HYPERLIPIDEMIA TYPE: ICD-10-CM

## 2018-01-19 DIAGNOSIS — I25.119 CORONARY ARTERY DISEASE INVOLVING NATIVE CORONARY ARTERY OF NATIVE HEART WITH ANGINA PECTORIS: Primary | ICD-10-CM

## 2018-01-19 DIAGNOSIS — G47.33 OSA ON CPAP: Chronic | ICD-10-CM

## 2018-01-19 DIAGNOSIS — G89.29 OTHER CHRONIC BACK PAIN: Chronic | ICD-10-CM

## 2018-01-19 DIAGNOSIS — Z12.31 SCREENING MAMMOGRAM, ENCOUNTER FOR: ICD-10-CM

## 2018-01-19 DIAGNOSIS — K21.9 GASTROESOPHAGEAL REFLUX DISEASE WITHOUT ESOPHAGITIS: ICD-10-CM

## 2018-01-19 PROCEDURE — 77067 SCR MAMMO BI INCL CAD: CPT | Mod: 26,,, | Performed by: RADIOLOGY

## 2018-01-19 PROCEDURE — 77067 SCR MAMMO BI INCL CAD: CPT | Mod: TC

## 2018-01-19 PROCEDURE — 99499 UNLISTED E&M SERVICE: CPT | Mod: S$GLB,,, | Performed by: INTERNAL MEDICINE

## 2018-01-19 PROCEDURE — 99999 PR PBB SHADOW E&M-EST. PATIENT-LVL III: CPT | Mod: PBBFAC,,, | Performed by: INTERNAL MEDICINE

## 2018-01-19 PROCEDURE — 99214 OFFICE O/P EST MOD 30 MIN: CPT | Mod: S$GLB,,, | Performed by: INTERNAL MEDICINE

## 2018-01-19 RX ORDER — OXYCODONE AND ACETAMINOPHEN 5; 325 MG/1; MG/1
1 TABLET ORAL
Qty: 150 TABLET | Refills: 0 | Status: SHIPPED | OUTPATIENT
Start: 2018-01-19 | End: 2018-01-24

## 2018-01-19 RX ORDER — OXYCODONE AND ACETAMINOPHEN 10; 325 MG/1; MG/1
1 TABLET ORAL
Qty: 150 TABLET | Refills: 0 | Status: SHIPPED | OUTPATIENT
Start: 2018-03-19 | End: 2018-02-22 | Stop reason: SDUPTHER

## 2018-01-19 RX ORDER — OXYCODONE AND ACETAMINOPHEN 5; 325 MG/1; MG/1
1 TABLET ORAL
Qty: 150 TABLET | Refills: 0 | Status: SHIPPED | OUTPATIENT
Start: 2018-02-19 | End: 2018-01-24

## 2018-01-19 NOTE — PROGRESS NOTES
CHIEF COMPLAINT: Follow up of diabetes, hypertension, hyperlipidemia, back pain.      HISTORY OF PRESENT ILLNESS: This is a 77-year-old woman who presents for follow up of above.     Rash under breast has resolved. Rash in right groin is not resolved.      She still has a cough for the last 2-3 months with thick white mucous from the back of her throat.   No mucinex. She is doing the albuterol neb daily which helps her breath better.  She continues to have sinus congestion with clear drainage.  She continues to take loratadine 10 mg daily and flonase daily.      Heartburn is controlled. She is taking pantoprazole 40 mg twice daily and ranitidine 300 mg at night.  She is taking dicyclomine 10 mg three times daily as needed which helps somewhat.   Symptoms occur 1-2 times a month.      She saw Dr Le 1/31/17 and [facility]. Her back is doing a little better. She is now walking without a cane. She has occasional left hip pain. She is taking gabapentin 100 mg 1 capsule three times daily and 3 at bedtime. Dose has been increased due to foot pain. Foot pain is better on higher dose of gabapentin. She takes oxycodone apap 5/325 one tablet up to 5 times daily which keeps her back pain under control. She has to take oxycodone apap when she is active and does things. If she does not go out, she does not have have to take as much oxycodone apap.        She continues to take aspirin 81 mg daily, coreg 25 mg twice daily and lasix 40 mg twice daily for her diastolic dysfunction and hypertension. She denies lightheadedness. No chest pain or shortness of breath. She is now taking atorvastatin 40 mg once daily for her cholesterol.      She is taking lantus 60 units at night and Humalog 23 units with meals. She is out of strips. She has not been checking her blood sugars. She is waiting for a prescription from [facility].  No hypoglycemia.       PAST MEDICAL HISTORY:   1. Diabetes mellitus   2.  "Hypertension   3. Hyperlipidemia   4. Left heart catheterization January 2007 which revealed luminal irregularities in the LAD, left circumflex and right coronary artery. She had diastolic dysfunction and patent renal arteries.   5. Sleep apnea   6. Obesity.   7. Nephrolithiasis status post lithotripsy.   8. Reflux - had nonerosive gastropathy on EGD September 2007. Gastritis on EGD 9/2010  9. Hidradenitis suppurativa.   10. History of diverticulitis with a hospitalized October 2007.   11. Migraines   12. Obesity.   13. 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    14. Total hysterectomy in 1969.   15. Cholecystectomy was done at that time as well.   16. 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   17. Colon polyps on colonscopy 11/2010 - due 2013   18. Hospitalization 12/10 for e coli sepsis due to left ureteral stone with left nephrostomy tube in [place], [place]   19. Left knee replacement 9/2012      MEDICATIONS and ALLERGIES: Updated on EPIC.     PHYSICAL EXAMINATION:   /78 (BP Location: Left arm, Patient Position: Sitting, BP Method: Medium (Manual))   Pulse 70   Ht 5' 8" (1.727 m)   Wt 104.9 kg (231 lb 3.2 oz)   SpO2 96%   BMI 35.15 kg/m²      GENERAL: She is alert, oriented, no apparent distress. Affect within   normal limits.  Conjunctiva anicteric. . Oropharynx clear.   NECK: Supple.   Respiratory: Effort normal. Lungs clear  HEART: Regular rate and rhythm without murmurs, gallops or rubs.   No lower extremity edema.     ABDOMEN: soft, non distended,  bowel sounds present, no hepatosplenomgaly.  Dry skin on the right inguinal area.      ASSESSMENT AND PLAN:   1. Gerd - stable pantoprazole to 40 mg daily and  ranitidine 300 mg nightly  3. Diabetes with neuropathy- stable - strips ordered. Sample meter given until strips arrive  4. Hypertension - stable.    5. Hyperlipidemia - on lipitor  6. Allergic rhinitis - stable  7. " Diastolic congestive heart failure - stable.    8. CRI -stable  9. Obesity - discussed diet, exercise and weight loss  10. CAD -risk factor modification  11. Aortic atherosclerosis and possible mesenteric artery stenosis- risk factor modification  12. Tortuous aorta - HTN controlled  13. Colon polyps - Colonoscopy in 8/2013 - 2 polyps. Flex sig 11/2013 - mild granular mucosa.  14. hyperparathryodisism - to have labs today and see nephrology next week   Recommended colonoscopy in 8/2018. Attempted colonoscopy 11/2016 du to diarrhea which has resolved. She had poor prep with hyperplastic rectal biopsy.   MMG 11/16 - scheduled for today  Prevnar 10/15 and flu shot 11/16  I will see her back 3 months, sooner if problems

## 2018-01-22 ENCOUNTER — TELEPHONE (OUTPATIENT)
Dept: INTERNAL MEDICINE | Facility: CLINIC | Age: 78
End: 2018-01-22

## 2018-01-22 NOTE — TELEPHONE ENCOUNTER
----- Message from Osmani Soria sent at 1/22/2018  8:58 AM CST -----  Contact: self/[phone]  Pt is calling to speak with someone in the office in regards to the medication oxyCODONE-acetaminophen (PERCOCET)  mg per tablet. She states that the doctor changed the strength of the medication without letting pt know and she wants to know why. Please call and advise.    Thank You

## 2018-01-24 RX ORDER — OXYCODONE AND ACETAMINOPHEN 10; 325 MG/1; MG/1
1 TABLET ORAL
Qty: 150 TABLET | Refills: 0 | Status: SHIPPED | OUTPATIENT
Start: 2018-01-24 | End: 2018-02-22 | Stop reason: SDUPTHER

## 2018-01-24 RX ORDER — OXYCODONE AND ACETAMINOPHEN 10; 325 MG/1; MG/1
1 TABLET ORAL
Qty: 150 TABLET | Refills: 0 | Status: SHIPPED | OUTPATIENT
Start: 2018-01-24 | End: 2018-01-24 | Stop reason: SDUPTHER

## 2018-01-25 ENCOUNTER — LAB VISIT (OUTPATIENT)
Dept: LAB | Facility: HOSPITAL | Age: 78
End: 2018-01-25
Attending: INTERNAL MEDICINE
Payer: MEDICARE

## 2018-01-25 ENCOUNTER — OFFICE VISIT (OUTPATIENT)
Dept: NEPHROLOGY | Facility: CLINIC | Age: 78
End: 2018-01-25
Payer: MEDICARE

## 2018-01-25 VITALS
HEIGHT: 68 IN | DIASTOLIC BLOOD PRESSURE: 78 MMHG | HEART RATE: 81 BPM | SYSTOLIC BLOOD PRESSURE: 138 MMHG | OXYGEN SATURATION: 96 % | WEIGHT: 230.19 LBS | BODY MASS INDEX: 34.89 KG/M2

## 2018-01-25 DIAGNOSIS — N18.30 STAGE 3 CHRONIC KIDNEY DISEASE: ICD-10-CM

## 2018-01-25 DIAGNOSIS — N18.30 STAGE 3 CHRONIC KIDNEY DISEASE: Primary | ICD-10-CM

## 2018-01-25 DIAGNOSIS — E21.3 HYPERPARATHYROIDISM: ICD-10-CM

## 2018-01-25 DIAGNOSIS — D64.9 LOW HEMOGLOBIN AND LOW HEMATOCRIT: ICD-10-CM

## 2018-01-25 LAB
BILIRUB UR QL STRIP: NEGATIVE
CLARITY UR REFRACT.AUTO: CLEAR
COLOR UR AUTO: YELLOW
CREAT UR-MCNC: 36 MG/DL
GLUCOSE UR QL STRIP: ABNORMAL
HGB UR QL STRIP: NEGATIVE
KETONES UR QL STRIP: NEGATIVE
LEUKOCYTE ESTERASE UR QL STRIP: NEGATIVE
NITRITE UR QL STRIP: NEGATIVE
PH UR STRIP: 5 [PH] (ref 5–8)
PROT UR QL STRIP: NEGATIVE
PROT UR-MCNC: <7 MG/DL
PROT/CREAT RATIO, UR: NORMAL
SP GR UR STRIP: 1.01 (ref 1–1.03)
URN SPEC COLLECT METH UR: ABNORMAL
UROBILINOGEN UR STRIP-ACNC: NEGATIVE EU/DL

## 2018-01-25 PROCEDURE — 84156 ASSAY OF PROTEIN URINE: CPT

## 2018-01-25 PROCEDURE — 99999 PR PBB SHADOW E&M-EST. PATIENT-LVL III: CPT | Mod: PBBFAC,,, | Performed by: INTERNAL MEDICINE

## 2018-01-25 PROCEDURE — 99214 OFFICE O/P EST MOD 30 MIN: CPT | Mod: S$GLB,,, | Performed by: INTERNAL MEDICINE

## 2018-01-25 PROCEDURE — 99499 UNLISTED E&M SERVICE: CPT | Mod: S$GLB,,, | Performed by: INTERNAL MEDICINE

## 2018-01-25 PROCEDURE — 81003 URINALYSIS AUTO W/O SCOPE: CPT

## 2018-01-25 NOTE — TELEPHONE ENCOUNTER
Changing the strength was an error by DR YANEZ.  She did not intend to change the strength.    She has sent a new prescription for the oxycodone apap 10/325 to [facility] -   Mrs Villasenor can take 2 of the 5/325 strength until she runs out .  When she runs out of this prescription in one month, Dr YANEZ will have to send another prescription in t the pharmacy with the correct dose

## 2018-01-25 NOTE — PROGRESS NOTES
"Subjective:       Patient ID: Henrietta Villasenor is a 77 y.o. Black or  female who presents for a follow up evaluation of CDK  The patient has a history of Type 2 diabetes in x 25 years (without diabetic retinopathy), Hyperlipidemia, CAD (MI in the past, diagnosed with non-obstructive coronary disease ), HTN, MARLON. The patient was send here for CKD and nephrolithiasis. s/p lithotrypsi in 2011 bilateral with ureteroscopy - also with a nephrostomy bag in 09, last attack right after Mitzi?. Had an episode of HANNAH in 2009. Ct 2/15 compared to CT 8/15 showed an increase in the size of stones.   The patient's sister had ESRD from diabetes,  no history of kidney stones. The patient does not freuqently use NSAIDS or herbal supplements. Her  The patient unfortunately is a very bad historian and most of her history is from the chart. The patient is taking furosemide twice a day for 6-7 yrs.   No recent symptoms from her kidney stones. She has the "cold" right now. Complains about significant AQUINO with 1/2 block of walking. Also orthopnea and nocturia. No stone events.  HPI  Review of Systems   Constitutional: Negative for activity change, appetite change, chills, fatigue, fever and unexpected weight change.   HENT: Negative.  Negative for nosebleeds.    Eyes: Negative.    Respiratory: Negative.  Negative for cough, chest tightness and shortness of breath.    Cardiovascular: Negative.  Negative for chest pain and leg swelling.   Gastrointestinal: Positive for abdominal pain, constipation and diarrhea. Negative for abdominal distention, anal bleeding, nausea and vomiting.   Genitourinary: Negative for decreased urine volume, difficulty urinating, dysuria, flank pain, frequency, hematuria, pelvic pain, urgency and vaginal bleeding.   Musculoskeletal: Positive for back pain. Negative for joint swelling and myalgias.        Knee replacement   Skin: Negative.  Negative for rash.   Neurological: Negative.  "   Psychiatric/Behavioral: Negative.    All other systems reviewed and are negative.      Objective:      Physical Exam   Constitutional: She is oriented to person, place, and time. She appears well-developed and well-nourished. No distress.   obese   HENT:   Head: Normocephalic and atraumatic.   Right Ear: External ear normal.   Left Ear: External ear normal.   Nose: Nose normal.   Mouth/Throat: Oropharynx is clear and moist.   Eyes: Conjunctivae and EOM are normal. Pupils are equal, round, and reactive to light.   Neck: Normal range of motion. Neck supple. No thyromegaly present.   Cardiovascular: Normal rate, regular rhythm, normal heart sounds and intact distal pulses.    Pulmonary/Chest: Effort normal and breath sounds normal. No respiratory distress. She has no wheezes. She has no rales. She exhibits no tenderness.   Abdominal: Soft. Normal appearance and bowel sounds are normal. She exhibits no distension. There is tenderness (mild LLQ). There is no rebound and no guarding.   Musculoskeletal: Normal range of motion. She exhibits no edema or tenderness.   Neurological: She is alert and oriented to person, place, and time. She has normal reflexes.   Skin: Skin is warm, dry and intact. She is not diaphoretic.   Psychiatric: She has a normal mood and affect. Her behavior is normal. Judgment and thought content normal.   Nursing note and vitals reviewed.      Assessment:       1. Stage 3 chronic kidney disease    2. Hyperparathyroidism    3. Low hemoglobin and low hematocrit        Plan:       1. CKD2-3 and nephrolithiasis. Likely multifactorial (vascular disease from DM/HTN). Most recent creatinine increased to 1.3mg/dl    Nonobstructing right nephrolithiasis and evidence of chronic kidney disease in an renal US in 2011. 24 hour urine only shows low citrate (was normal in last stone profile). Normal urinary calcium (dispite the lasix). Last serum , serum uric acid 6.  SPEP/HAZEL were negative for monoclonal  spikes, UA and protein/creatinine ratio were negative.   No recent symptoms from her kidney stone  - ultrasound for stone surveillance      Patient reports swelling and itching with lisinopril.     Lab Results   Component Value Date    CREATININE 1.3 01/19/2018      will repeat  Acid-Base:   Lab Results   Component Value Date     01/19/2018    K 4.2 01/19/2018    CO2 31 (H) 01/19/2018     2. HTN: Blood pressures not well controlled today in clinic. Will send her for lashon and renin as she has evidence of alkalosis and hypokalemia (on diuretics though).  Will start her on spironolactone as next medication for her BP.    3. Renal osteodystrophy: last PTH elevated: will start on ergo  Lab Results   Component Value Date    .0 (H) 08/08/2017    CALCIUM 9.6 01/19/2018    PHOS 3.9 01/19/2018       4. Anemia: wnl  Lab Results   Component Value Date    HGB 11.7 (L) 12/22/2017        5. DM:  Last HbA1C: not well controlled.   Lab Results   Component Value Date    HGBA1C 7.8 (H) 12/08/2017       6. Lipid management: not on a statin secondary to leg cramps, now well controlled - will defer therapy to primary care.   Lab Results   Component Value Date    LDLCALC 112.8 08/18/2017     Urine today  Blood work in 1 month   Follow up in 5 month with labs and renal US

## 2018-01-31 ENCOUNTER — OFFICE VISIT (OUTPATIENT)
Dept: PODIATRY | Facility: CLINIC | Age: 78
End: 2018-01-31
Payer: MEDICARE

## 2018-01-31 VITALS
RESPIRATION RATE: 18 BRPM | HEART RATE: 72 BPM | WEIGHT: 230 LBS | HEIGHT: 68 IN | BODY MASS INDEX: 34.86 KG/M2 | DIASTOLIC BLOOD PRESSURE: 82 MMHG | SYSTOLIC BLOOD PRESSURE: 165 MMHG

## 2018-01-31 DIAGNOSIS — L84 CORN OR CALLUS: ICD-10-CM

## 2018-01-31 DIAGNOSIS — B35.1 DERMATOPHYTOSIS OF NAIL: ICD-10-CM

## 2018-01-31 DIAGNOSIS — E11.49 TYPE II DIABETES MELLITUS WITH NEUROLOGICAL MANIFESTATIONS: Primary | ICD-10-CM

## 2018-01-31 PROCEDURE — 11721 DEBRIDE NAIL 6 OR MORE: CPT | Mod: 59,Q9,S$GLB, | Performed by: PODIATRIST

## 2018-01-31 PROCEDURE — 99499 UNLISTED E&M SERVICE: CPT | Mod: S$GLB,,, | Performed by: PODIATRIST

## 2018-01-31 PROCEDURE — 11057 PARNG/CUTG B9 HYPRKR LES >4: CPT | Mod: Q9,S$GLB,, | Performed by: PODIATRIST

## 2018-01-31 PROCEDURE — 99999 PR PBB SHADOW E&M-EST. PATIENT-LVL III: CPT | Mod: PBBFAC,,, | Performed by: PODIATRIST

## 2018-01-31 NOTE — PROGRESS NOTES
Subjective:      Patient ID: Henrietta Villasenor is a 77 y.o. female.    Chief Complaint: PCP (Jeannine Arriaga MD 1/19/18); Diabetic Foot Exam; Foot Pain; Foot Problem (numbness ); and Nail Care    Henrietta is a 77 y.o. female who presents to the clinic for evaluation and treatment of high risk feet. Henrietta has a past medical history of Abnormality of lung (11/08/2011); Anxiety; Arthritis; CAD (coronary artery disease); Calculus of ureter (2/22/2011); Cataract; CHF (congestive heart failure); Chronic back pain (7/29/2012); Colon polyp (9/2010; 11/2010); Colon polyps (7/29/2012); Coronary artery disease involving native coronary artery of native heart with angina pectoris; Depression; Diabetes mellitus; Diabetes mellitus type II; Diverticulitis large intestine (7/27/2015); Diverticulosis (09/25/2010; 11/02/2010; 11/08/2011; 7/29/2012); Duodenal disorder (08/25/2011); Duodenal ulcer, unspecified as acute or chronic, without hemorrhage, perforation, or obstruction (8/24/2011); E. 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); 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); Obesity; MARLON (obstructive sleep apnea); Pain; Peripheral neuropathy; Pneumonia; Polyneuropathy; Postherpetic neuralgia; Recurrent nephrolithiasis; S/P knee replacement (10/2/2012); S/P TKR (total knee replacement) (12/26/2012); Sensorineural hearing loss of both ears; Thyroid disease (11/08/2011); Trouble in  sleeping; Type II or unspecified type diabetes mellitus with neurological manifestations, not stated as uncontrolled(250.60); Type II or unspecified type diabetes mellitus with peripheral circulatory disorders, not stated as uncontrolled(250.70); and Type II or unspecified type diabetes mellitus with renal manifestations, not stated as uncontrolled(250.40). The patient's chief complaint is long, thick toenails. This patient has documented high risk feet requiring routine maintenance secondary to diabetes mellitis and those secondary complications of diabetes, as mentioned..       PCP: Jeannine Arriaga MD    Date Last Seen by PCP:   Chief Complaint   Patient presents with    PCP     Jeannine Arriaga MD 1/19/18    Diabetic Foot Exam    Foot Pain    Foot Problem     numbness     Nail Care         Current shoe gear: casual shoes    Hemoglobin A1C   Date Value Ref Range Status   12/08/2017 7.8 (H) 4.0 - 5.6 % Final     Comment:     According to ADA guidelines, hemoglobin A1c <7.0% represents  optimal control in non-pregnant diabetic patients. Different  metrics may apply to specific patient populations.   Standards of Medical Care in Diabetes-2016.  For the purpose of screening for the presence of diabetes:  <5.7%     Consistent with the absence of diabetes  5.7-6.4%  Consistent with increasing risk for diabetes   (prediabetes)  >or=6.5%  Consistent with diabetes  Currently, no consensus exists for use of hemoglobin A1c  for diagnosis of diabetes for children.  This Hemoglobin A1c assay has significant interference with fetal   hemoglobin   (HbF). The results are invalid for patients with abnormal amounts of   HbF,   including those with known Hereditary Persistence   of Fetal Hemoglobin. Heterozygous hemoglobin variants (HbAS, HbAC,   HbAD, HbAE, HbA2) do not significantly interfere with this assay;   however, presence of multiple variants in a sample may impact the %   interference.     08/18/2017 7.3 (H) 4.0 -  5.6 % Final     Comment:     According to ADA guidelines, hemoglobin A1c <7.0% represents  optimal control in non-pregnant diabetic patients. Different  metrics may apply to specific patient populations.   Standards of Medical Care in Diabetes-2016.  For the purpose of screening for the presence of diabetes:  <5.7%     Consistent with the absence of diabetes  5.7-6.4%  Consistent with increasing risk for diabetes   (prediabetes)  >or=6.5%  Consistent with diabetes  Currently, no consensus exists for use of hemoglobin A1c  for diagnosis of diabetes for children.  This Hemoglobin A1c assay has significant interference with fetal   hemoglobin   (HbF). The results are invalid for patients with abnormal amounts of   HbF,   including those with known Hereditary Persistence   of Fetal Hemoglobin. Heterozygous hemoglobin variants (HbAS, HbAC,   HbAD, HbAE, HbA2) do not significantly interfere with this assay;   however, presence of multiple variants in a sample may impact the %   interference.     08/18/2017 7.3 (H) 4.0 - 5.6 % Final     Comment:     According to ADA guidelines, hemoglobin A1c <7.0% represents  optimal control in non-pregnant diabetic patients. Different  metrics may apply to specific patient populations.   Standards of Medical Care in Diabetes-2016.  For the purpose of screening for the presence of diabetes:  <5.7%     Consistent with the absence of diabetes  5.7-6.4%  Consistent with increasing risk for diabetes   (prediabetes)  >or=6.5%  Consistent with diabetes  Currently, no consensus exists for use of hemoglobin A1c  for diagnosis of diabetes for children.  This Hemoglobin A1c assay has significant interference with fetal   hemoglobin   (HbF). The results are invalid for patients with abnormal amounts of   HbF,   including those with known Hereditary Persistence   of Fetal Hemoglobin. Heterozygous hemoglobin variants (HbAS, HbAC,   HbAD, HbAE, HbA2) do not significantly interfere with this assay;  "  however, presence of multiple variants in a sample may impact the %   interference.         Review of Systems   Constitution: Negative for chills, decreased appetite and fever.   Cardiovascular: Negative for leg swelling.   Skin: Positive for dry skin and nail changes. Negative for color change, flushing and itching.   Musculoskeletal: Positive for arthritis. Negative for back pain, falls, joint pain, joint swelling, muscle cramps and myalgias.   Gastrointestinal: Negative for nausea and vomiting.   Neurological: Positive for numbness and paresthesias. Negative for loss of balance.           Objective:       Vitals:    01/31/18 1421   BP: (!) 165/82   Pulse: 72   Resp: 18   Weight: 104.3 kg (230 lb)   Height: 5' 8" (1.727 m)   PainSc:   8   PainLoc: Foot        Physical Exam   Constitutional: She is oriented to person, place, and time. She appears well-developed and well-nourished.   Cardiovascular:   Dorsalis pedis and posterior tibial pulses are diminished Bilaterally. Toes are cool to touch. Feet are warm proximally.There is decreased digital hair. Skin is atrophic, slightly hyperpigmented, and mildly edematous       Musculoskeletal: Normal range of motion. She exhibits no edema or tenderness.   Adequate joint range of motion without pain, limitation, nor crepitation Bilateral feet and ankle joints. Muscle strength is 5/5 in all groups bilaterally.      semi reducible IPJ digital contracture 2nd toe b/l    Neurological: She is alert and oriented to person, place, and time.   [place] 5.07 monofilamant testing is diminished Mynor feet. Sharp/dull sensation diminished Bilaterally. Light touch absent Bilaterally.       Skin: Skin is warm, dry and intact. No abrasion, no bruising, no burn, no ecchymosis and no lesion noted. No erythema.   Nails x 10  are elongated by  2-5mm's, thickened by 2-5 mm's, dystrophic, and are darkened in  coloration . Xerosis Bilaterally. No open lesions noted.    Hyperkeratotic " tissue noted to distal toes 1-4 b/l        Psychiatric: She has a normal mood and affect. Her behavior is normal.   Nursing note and vitals reviewed.            Assessment:       Encounter Diagnoses   Name Primary?    Type II diabetes mellitus with neurological manifestations Yes    Dermatophytosis of nail     Corn or callus          Plan:       Henrietta was seen today for pcp, diabetic foot exam, foot pain, foot problem and nail care.    Diagnoses and all orders for this visit:    Type II diabetes mellitus with neurological manifestations    Dermatophytosis of nail    Corn or callus      I counseled the patient on her conditions, their implications and medical management.    Shoe inspection. Diabetic Foot Education. Patient reminded of the importance of good nutrition and blood sugar control to help prevent podiatric complications of diabetes. Patient instructed on proper foot hygeine. We discussed wearing proper shoe gear, daily foot inspections, never walking without protective shoe gear, never putting sharp instruments to feet    - With patient's permission, nails were aggressively reduced and debrided x 10 to their soft tissue attachment mechanically and with electric , removing all offending nail and debris. Patient relates relief following the procedure. She will continue to monitor the areas daily, inspect her feet, wear protective shoe gear when ambulatory, moisturizer to maintain skin integrity and follow in this office in approximately 2-3 months, sooner p.r.n.    - After cleansing the  area w/ alcohol prep pad the above mentioned hyperkeratosis was trimmed utilizing No 15 scapel, to a smooth base with out incident. Patient tolerated this  well and reported comfort to the area of distal toes 1-4 b/l   .

## 2018-02-02 ENCOUNTER — HOSPITAL ENCOUNTER (OUTPATIENT)
Dept: RADIOLOGY | Facility: HOSPITAL | Age: 78
Discharge: HOME | End: 2018-02-02
Attending: INTERNAL MEDICINE
Payer: MEDICARE

## 2018-02-02 DIAGNOSIS — N18.30 STAGE 3 CHRONIC KIDNEY DISEASE: ICD-10-CM

## 2018-02-02 PROCEDURE — 76770 US EXAM ABDO BACK WALL COMP: CPT | Mod: TC

## 2018-02-02 PROCEDURE — 76770 US EXAM ABDO BACK WALL COMP: CPT | Mod: 26,,, | Performed by: RADIOLOGY

## 2018-02-05 NOTE — PROGRESS NOTES
Subjective:      Patient ID: Henrietta Villasenor is a 77 y.o. female.    Chief Complaint: Back Pain    Ms Villasenor is a 76 yo female here for follow up of her CTS and her her low back pain.  She was last seen by me on 1/31/2017 and she was having more pain and wanted to go back to healthy back.  She was going to continue the exercise at [employer].  on 9/29/2016 and we did bilateral CT injections which also helped.  Today, she is having more hand pain.  She feels like the hand is swollen.  She feels like the shot really helped.  The right is worse than the left.  She shakes them.  She feels like she is still having left back pain.  She did not continue her strengthening program.  She went to a couple of times.  She always does well with therapy but does not continue the exercises.  She has not been doing stretches from therapy.  The back pain is 5/10.  She has a cough, but no fever.  She was sick in [facility] and got down to 199, but has since gained the weight back.  She was nauseated.       Past Medical History:  11/08/2011: Abnormality of lung      Comment: Stable bandlike opacities at the lung bases,                most likely representing    No date: Anxiety  No date: Arthritis  No date: CAD (coronary artery disease)  2/22/2011: Calculus of ureter  No date: Cataract  No date: CHF (congestive heart failure)  7/29/2012: Chronic back pain  9/2010; 11/2010: Colon polyp  7/29/2012: Colon polyps  No date: Coronary artery disease involving native coron*  No date: Depression  No date: Diabetes mellitus  No date: Diabetes mellitus type II  7/27/2015: Diverticulitis large intestine  09/25/2010; 11/02/2010; 11/08/2011; 7/29/2012: Diverticulosis  08/25/2011: Duodenal disorder      Comment: Duodenal erosion noted on EGD.  8/24/2011: Duodenal ulcer, unspecified as acute or chroni*  12/2010: E. coli sepsis      Comment: Due to left ureteral stone with left                nephrostomy tube - hospitalized in [place]  01/24/2012: Esophageal  dysmotility      Comment: Noted on upper GI-barium swallow.  1969: Facial weakness      Comment: Left facial weakness s/p left mastoidectomy in               ~ 1969.  11/08/2011: Fatty liver      Comment: Reported on CT-abdomen and in 06/2012 Gastro                clinic visit note.  09/29/2010: Gastric polyp  7/29/2012: GERD (gastroesophageal reflux disease)  11/08/2011: Hepatomegaly      Comment: Reported on CT-abdomen  2/28/2011: Herpes zoster with other nervous system compli*  06/26/2006; 09/29/2010; 08/25/2011: Hiatal hernia      Comment: Noted on barium swallow 2006; noted on  EGD                2011.  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  No date: Trouble in sleeping  No date: Type II or unspecified type diabetes mellitus *  No date: Type II or unspecified type diabetes mellitus *  No date: Type II or unspecified type diabetes mellitus *    Past Surgical History:  No date: BELPHAROPTOSIS REPAIR      Comment: s/p LAMBERTO levator repair - Dr. Dejesus  No date: CARDIAC CATHETERIZATION  3/13/2012: CARPAL TUNNEL RELEASE  No date: CHOLECYSTECTOMY  11/16/2016: COLONOSCOPY N/A      Comment: Procedure: COLONOSCOPY;  Surgeon: Chris Storey MD;  Location: [address]  FLR);                 Service: Endoscopy;  Laterality: N/A;  6/14/2017: COLONOSCOPY N/A      Comment: Procedure: COLONOSCOPY;  Surgeon: Chris Storey MD;  Location: [facility] (4TH FLR);                 Service: Endoscopy;  Laterality: N/A;                 colonoscopy in 3 months with better bowel prep.               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  No date: NEPHROSTOMY  No date: OOPHORECTOMY  9/13/2012: TOTAL KNEE ARTHROPLASTY      Comment: Left    Review of patient's family history indicates:    Sleep apnea                    Sister                    Cancer                         Sister                      Comment: breast    Diabetes                       Sister                    Breast cancer                  Sister                    Cancer                         Mother                      Comment: brain tumor    Hypertension                   Mother                    Heart disease                  Father                    Heart attack                   Father                    Dementia                       Brother                   Diabetes                       Brother                   Lupus                          Brother                   No Known Problems              Daughter                  No Known Problems              Son                       Diabetes                       Sister                    Lupus                          Sister                    Diabetes                       Sister                    Cancer                         Sister                    Kidney disease                 Sister                    Heart disease                  Sister                    Liver cancer                   Brother                   Cirrhosis                      Brother                   No  Known Problems              Daughter                  No Known Problems              Son                       No Known Problems              Son                       Diabetes                       Other                     Ovarian cancer                 Other                       Comment: Niece     Breast cancer                  Maternal Aunt             Glaucoma                       Neg Hx                    Blindness                      Neg Hx                    Celiac disease                 Neg Hx                    Colon cancer                   Neg Hx                    Colon polyps                   Neg Hx                    Esophageal cancer              Neg Hx                    Inflammatory bowel disease     Neg Hx                    Liver disease                  Neg Hx                    Rectal cancer                  Neg Hx                    Stomach cancer                 Neg Hx                    Ulcerative colitis             Neg Hx                    Melanoma                       Neg Hx                    Multiple sclerosis             Neg Hx                    Psoriasis                      Neg Hx                      Social History    Marital status: Single              Spouse name:                       Years of education:                 Number of children: 5             Social History Main Topics    Smoking status: Former Smoker                                                                Packs/day: 1.00      Years: 15.00          Types: Cigarettes       Quit date: 7/24/2000    Smokeless tobacco: Never Used                        Alcohol use: No              Drug use: No              Sexual activity: No                   Social History Narrative    Single with 5 children. Lives alone. Retired [employer].        Current Outpatient Prescriptions:  ACCU-CHEK FASTCLIX Misc, , Disp: , Rfl:   ACCU-CHEK SMARTVIEW TEST STRIP Strp, TEST THREE TIMES DAILY, Disp: 300 strip, Rfl: 3  albuterol (PROVENTIL)  "2.5 mg /3 mL (0.083 %) nebulizer solution, Take 3 mLs (2.5 mg total) by nebulization every 6 (six) hours as needed for Wheezing. Rescue, Disp: 90 mL, Rfl: 6  ALCOHOL ANTISEPTIC PADS (ALCOHOL PREP PADS TOP), , Disp: , Rfl:   aspirin (ECOTRIN) 81 MG EC tablet, Take 1 tablet by mouth Daily., Disp: , Rfl:   atorvastatin (LIPITOR) 40 MG tablet, Take 1 tablet (40 mg total) by mouth once daily., Disp: 90 tablet, Rfl: 3  BD ULTRA-FINE OLU PEN NEEDLES 32 x 5/32 " Ndle, Uses 4 times daily, on multiple daily insulin injections, Disp: 130 each, Rfl: 12  carvedilol (COREG) 25 MG tablet, TAKE 1 TABLET TWICE DAILY, Disp: 180 tablet, Rfl: 3  cholecalciferol, vitamin D3, (VITAMIN D) 2,000 unit Cap, Take by mouth. 1 Capsule Oral Every morning, Disp: , Rfl:   diaper,brief,adult,disposable Misc, , Disp: , Rfl:   dicyclomine (BENTYL) 10 MG capsule, Take 1 capsule (10 mg total) by mouth 3 (three) times daily as needed (abdominal pain)., Disp: 60 capsule, Rfl: 3  duloxetine (CYMBALTA) 60 MG capsule, Take 1 capsule (60 mg total) by mouth once daily., Disp: 30 capsule, Rfl: 11  fluticasone (FLONASE) 50 mcg/actuation nasal spray, SHAKE LQ AND U 2 SPRAYS IEN QD, Disp: 1 Bottle, Rfl: 6  furosemide (LASIX) 40 MG tablet, TAKE 1 TABLET twice daily, Disp: 270 tablet, Rfl: 3  gabapentin (NEURONTIN) 100 MG capsule, TAKE 1 CAPSULE THREE TIMES DAILY  AND TAKE 3 CAPSULES AT BEDTIME, Disp: 540 capsule, Rfl: 2  insulin aspart (NOVOLOG) 100 unit/mL InPn pen, 23 units with meals plus correction scale. Max daily dose=120 units, Disp: 8 Box, Rfl: 3  insulin glargine (LANTUS SOLOSTAR) 100 unit/mL (3 mL) InPn pen, Inject 60 Units into the skin every evening., Disp: 4 Box, Rfl: 3  Lactobacillus acidophilus (PROBIOTIC) 10 billion cell Cap, Take 1 capsule by mouth once daily., Disp: , Rfl:   loratadine (CLARITIN) 10 mg tablet, Take 1 tablet (10 mg total) by mouth once daily., Disp: 90 tablet, Rfl: 4  MUSCLE RUB, WITH CAMPHOR, 4-30-10 % Crea, , Disp: , Rfl: " "  nitroGLYCERIN (NITROSTAT) 0.4 MG SL tablet, Place 1 tablet (0.4 mg total) under the tongue every 5 (five) minutes as needed., Disp: 25 tablet, Rfl: 2  nystatin (MYCOSTATIN) cream, Apply topically 2 (two) times daily., Disp: 120 g, Rfl: 3  ofloxacin (OCUFLOX) 0.3 % ophthalmic solution, Place 1 drop into the left eye 3 (three) times daily., Disp: 5 mL, Rfl: 1  ondansetron (ZOFRAN) 4 MG tablet, Take 1 tablet (4 mg total) by mouth every 8 (eight) hours as needed for Nausea., Disp: 30 tablet, Rfl: 3  (START ON 3/19/2018) oxyCODONE-acetaminophen (PERCOCET)  mg per tablet, Take 1 tablet by mouth 5 (five) times daily., Disp: 150 tablet, Rfl: 0  oxyCODONE-acetaminophen (PERCOCET)  mg per tablet, Take 1 tablet by mouth 5 (five) times daily., Disp: 150 tablet, Rfl: 0  pantoprazole (PROTONIX) 40 MG tablet, TAKE 1 TABLET(40 MG) BY MOUTH twice daily, Disp: 180 tablet, Rfl: 4  pen needle, diabetic (SURE-FINE PEN NEEDLES) 29 gauge x 1/2" Ndle, Use 4 times daily, Disp: 400 each, Rfl: 4  prednisoLONE acetate (PRED FORTE) 1 % DrpS, Place 1 drop into the left eye 3 (three) times daily., Disp: 5 mL, Rfl: 1  promethazine (PHENERGAN) 6.25 mg/5 mL syrup, Take 5 mLs (6.25 mg total) by mouth every 6 (six) hours as needed (cough)., Disp: 118 mL, Rfl: 0  tizanidine (ZANAFLEX) 4 MG tablet, Take 1 tablet (4 mg total) by mouth nightly as needed., Disp: 30 tablet, Rfl: 3  triamcinolone acetonide 0.1% (KENALOG) 0.1 % cream, Apply topically 3 (three) times daily., Disp: 28.4 g, Rfl: 0  VENTOLIN HFA 90 mcg/actuation inhaler, INHALE 2 PUFFS INTO THE LUNGS  EVERY 4 HOURS AS NEEDED FOR ASTHMA, Disp: 18 g, Rfl: 1    No current facility-administered medications for this visit.       Review of patient's allergies indicates:   -- Crestor (rosuvastatin)     --  Cramping in legs   -- Ezetimibe     --  Other reaction(s): abdominal pain, Diarrhea   -- Hydrocodone     --  Other reaction(s): Itching   -- Lisinopril     --  Other reaction(s): cough   " -- Sulfa (sulfonamide antibiotics)     --  Itching and Rash   -- Sulfamethoxazole    -- Sulfamethoxazole-trimethoprim    -- Trimethoprim    -- Valsartan     --  Other reaction(s): Angioedema              Review of Systems   Constitution: Negative for weight gain and weight loss.   Cardiovascular: Positive for dyspnea on exertion. Negative for chest pain.   Respiratory: Negative for shortness of breath.    Musculoskeletal: Positive for back pain and myalgias. Negative for joint pain, joint swelling and neck pain.   Gastrointestinal: Negative for abdominal pain and bowel incontinence.   Genitourinary: Negative for bladder incontinence.   Neurological: Positive for dizziness and numbness (bilateral hands better after injection).         Objective:        General: Henrietta is well-developed, well-nourished, appears stated age, in no acute distress, alert and oriented to time, place and person.     General    Vitals reviewed.  Constitutional: She is oriented to person, place, and time. She appears well-developed and well-nourished.   HENT:   Head: Normocephalic and atraumatic.   Pulmonary/Chest: Effort normal.   Neurological: She is alert and oriented to person, place, and time.   Psychiatric: She has a normal mood and affect. Her behavior is normal. Judgment and thought content normal.     General Musculoskeletal Exam   Gait: normal     Right Ankle/Foot Exam     Tests   Heel Walk: able to perform  Tiptoe Walk: able to perform    Left Ankle/Foot Exam     Tests   Heel Walk: able to perform  Tiptoe Walk: able to perform  Back (L-Spine & T-Spine) / Neck (C-Spine) Exam     Tenderness Right paramedian tenderness of the Sacrum. Left paramedian tenderness of the Sacrum.     Back (L-Spine & T-Spine) Range of Motion   Extension: 10   Flexion: 60   Lateral Bend Right: 10   Lateral Bend Left: 10   Rotation Right: 20   Rotation Left: 20     Spinal Sensation   Right Side Sensation  C-Spine Level: normal   L-Spine Level: normal  S-Spine  Level: normal  Left Side Sensation  C-Spine Level: normal  L-Spine Level: normal  S-Spine Level: normal    Back (L-Spine & T-Spine) Tests   Right Side Tests  Straight leg raise:      Sitting SLR: > 70 degrees      Left Side Tests  Straight leg raise:     Sitting SLR: > 70 degrees          Other She has no scoliosis .  Spinal Kyphosis:  Absent      Right Hand/Wrist Exam     Tests   Phalens Sign: positive  Tinels Sign (Medial Nerve): positive    Atrophy   Thenar:  negative  Hypothenar:  negative      Left Hand/Wrist Exam     Tests   Phalens Sign: positive  Tinels Sign (Medial Nerve): positive    Atrophy  Thenar:  Negative  Hypothenar:  negative          Muscle Strength   Right Upper Extremity   Biceps: 5/5/5   Deltoid:  5/5  Triceps:  5/5  Wrist Extension: 5/5/5   : 5/5/5   Finger Flexors:  5/5  Left Upper Extremity  Biceps: 5/5/5   Deltoid:  5/5  Triceps:  5/5  Wrist Extension: 5/5/5   :  5/5/5   Finger Flexors:  5/5  Right Lower Extremity   Hip Flexion: 5/5   Quadriceps:  5/5   Anterior tibial:  5/5/5  EHL:  5/5  Left Lower Extremity   Hip Flexion: 5/5   Quadriceps:  5/5   Anterior tibial:  5/5/5   EHL:  5/5    Reflexes     Left Side  Biceps:  2+  Triceps:  2+  Brachioradialis:  2+  Quadriceps:  2+  Achilles:  2+  Left Nichols's Sign:  Absent  Babinski Sign:  absent    Right Side   Biceps:  2+  Triceps:  2+  Brachioradialis:  2+  Quadriceps:  2+  Achilles:  2+  Right Nichols's Sign:  absent  Babinski Sign:  absent    Vascular Exam     Right Pulses        Carotid:                  2+    Left Pulses        Carotid:                  2+    Capillary Refill  Right Hand: normal capillary refill  Left Hand: normal capillary refill              Assessment:       1. Bilateral carpal tunnel syndrome    2. DDD (degenerative disc disease), lumbar    3. Spinal enthesopathy    4. Muscle spasm    5. Chronic bilateral low back pain without sciatica           Plan:       Orders Placed This Encounter    Carpal Tunnel     gabapentin (NEURONTIN) 100 MG capsule     1. Repeat Carpal tunnel injection.  See procedure note.  A time out was performed, the correct patient, procedure, site, and position confirmed.     previous injectionon 9/29/2016 was very helpful  2.  hand splints at night  3. Continue gabapentin 2 in am And 3 at night  4. Encourage her to go back to the gym and to restart exercises from therapy.  She knows she looses information  5. Continue tizanidine  6.  rtc 4 months      Follow-up: Follow-up in about 4 months (around 6/6/2018). If there are any questions prior to this, the patient was instructed to contact the office.

## 2018-02-06 ENCOUNTER — OFFICE VISIT (OUTPATIENT)
Dept: SPINE | Facility: CLINIC | Age: 78
End: 2018-02-06
Attending: PHYSICAL MEDICINE & REHABILITATION
Payer: MEDICARE

## 2018-02-06 VITALS
HEART RATE: 80 BPM | SYSTOLIC BLOOD PRESSURE: 138 MMHG | WEIGHT: 228 LBS | BODY MASS INDEX: 34.56 KG/M2 | DIASTOLIC BLOOD PRESSURE: 71 MMHG | HEIGHT: 68 IN

## 2018-02-06 DIAGNOSIS — M46.00 SPINAL ENTHESOPATHY: ICD-10-CM

## 2018-02-06 DIAGNOSIS — G89.29 CHRONIC BILATERAL LOW BACK PAIN WITHOUT SCIATICA: ICD-10-CM

## 2018-02-06 DIAGNOSIS — M62.838 MUSCLE SPASM: ICD-10-CM

## 2018-02-06 DIAGNOSIS — M51.36 DDD (DEGENERATIVE DISC DISEASE), LUMBAR: ICD-10-CM

## 2018-02-06 DIAGNOSIS — G56.03 BILATERAL CARPAL TUNNEL SYNDROME: Primary | ICD-10-CM

## 2018-02-06 DIAGNOSIS — M54.50 CHRONIC BILATERAL LOW BACK PAIN WITHOUT SCIATICA: ICD-10-CM

## 2018-02-06 PROCEDURE — 20526 THER INJECTION CARP TUNNEL: CPT | Mod: 50,S$GLB,, | Performed by: PHYSICAL MEDICINE & REHABILITATION

## 2018-02-06 PROCEDURE — 1159F MED LIST DOCD IN RCRD: CPT | Mod: S$GLB,,, | Performed by: PHYSICAL MEDICINE & REHABILITATION

## 2018-02-06 PROCEDURE — 99499 UNLISTED E&M SERVICE: CPT | Mod: S$GLB,,, | Performed by: PHYSICAL MEDICINE & REHABILITATION

## 2018-02-06 PROCEDURE — 99214 OFFICE O/P EST MOD 30 MIN: CPT | Mod: 25,S$GLB,, | Performed by: PHYSICAL MEDICINE & REHABILITATION

## 2018-02-06 PROCEDURE — 1125F AMNT PAIN NOTED PAIN PRSNT: CPT | Mod: S$GLB,,, | Performed by: PHYSICAL MEDICINE & REHABILITATION

## 2018-02-06 PROCEDURE — 3008F BODY MASS INDEX DOCD: CPT | Mod: S$GLB,,, | Performed by: PHYSICAL MEDICINE & REHABILITATION

## 2018-02-06 PROCEDURE — 99999 PR PBB SHADOW E&M-EST. PATIENT-LVL III: CPT | Mod: PBBFAC,,, | Performed by: PHYSICAL MEDICINE & REHABILITATION

## 2018-02-06 RX ORDER — TRIAMCINOLONE ACETONIDE 40 MG/ML
40 INJECTION, SUSPENSION INTRA-ARTICULAR; INTRAMUSCULAR
Status: DISCONTINUED | OUTPATIENT
Start: 2018-02-06 | End: 2018-02-06 | Stop reason: HOSPADM

## 2018-02-06 RX ORDER — GABAPENTIN 100 MG/1
CAPSULE ORAL
Qty: 540 CAPSULE | Refills: 2 | Status: SHIPPED | OUTPATIENT
Start: 2018-02-06 | End: 2018-03-20

## 2018-02-06 RX ADMIN — TRIAMCINOLONE ACETONIDE 40 MG: 40 INJECTION, SUSPENSION INTRA-ARTICULAR; INTRAMUSCULAR at 09:02

## 2018-02-06 NOTE — PROCEDURES
Carpal Tunnel  Date/Time: 2/6/2018 9:51 AM  Performed by: JEFF WEBSTER  Authorized by: JEFF WEBSTER     Consent Done?:  Yes (Verbal)  Indications:  Pain  Site marked: The procedure site was marked    Timeout: Prior to procedure the correct patient, procedure, and site was verified      Location:  Wrist  Site:  R radiocarpal and L radiocarpal  Prep: Patient was prepped and draped in usual sterile fashion    Ultrasonic Guidance for needle placement: No  Needle size:  25 G  Approach:  Volar  Medications:  40 mg triamcinolone acetonide 40 mg/mL; 40 mg triamcinolone acetonide 40 mg/mL  Patient tolerance:  Patient tolerated the procedure well with no immediate complications

## 2018-02-14 ENCOUNTER — OFFICE VISIT (OUTPATIENT)
Dept: INTERNAL MEDICINE | Facility: CLINIC | Age: 78
End: 2018-02-14
Payer: MEDICARE

## 2018-02-14 ENCOUNTER — HOSPITAL ENCOUNTER (OUTPATIENT)
Dept: RADIOLOGY | Facility: HOSPITAL | Age: 78
Discharge: HOME | End: 2018-02-14
Attending: INTERNAL MEDICINE
Payer: MEDICARE

## 2018-02-14 VITALS
SYSTOLIC BLOOD PRESSURE: 164 MMHG | HEART RATE: 72 BPM | HEIGHT: 68 IN | DIASTOLIC BLOOD PRESSURE: 90 MMHG | WEIGHT: 227.75 LBS | BODY MASS INDEX: 34.52 KG/M2

## 2018-02-14 DIAGNOSIS — M47.812 OSTEOARTHRITIS OF CERVICAL SPINE, UNSPECIFIED SPINAL OSTEOARTHRITIS COMPLICATION STATUS: ICD-10-CM

## 2018-02-14 DIAGNOSIS — Z96.652 STATUS POST TOTAL LEFT KNEE REPLACEMENT: ICD-10-CM

## 2018-02-14 DIAGNOSIS — M25.562 ACUTE PAIN OF LEFT KNEE: Primary | ICD-10-CM

## 2018-02-14 DIAGNOSIS — M25.562 ACUTE PAIN OF LEFT KNEE: ICD-10-CM

## 2018-02-14 PROCEDURE — 73560 X-RAY EXAM OF KNEE 1 OR 2: CPT | Mod: TC,LT

## 2018-02-14 PROCEDURE — 99999 PR PBB SHADOW E&M-EST. PATIENT-LVL III: CPT | Mod: PBBFAC,,, | Performed by: INTERNAL MEDICINE

## 2018-02-14 PROCEDURE — 73560 X-RAY EXAM OF KNEE 1 OR 2: CPT | Mod: 26,LT,, | Performed by: RADIOLOGY

## 2018-02-14 PROCEDURE — 1125F AMNT PAIN NOTED PAIN PRSNT: CPT | Mod: S$GLB,,, | Performed by: INTERNAL MEDICINE

## 2018-02-14 PROCEDURE — 1159F MED LIST DOCD IN RCRD: CPT | Mod: S$GLB,,, | Performed by: INTERNAL MEDICINE

## 2018-02-14 PROCEDURE — 99213 OFFICE O/P EST LOW 20 MIN: CPT | Mod: S$GLB,,, | Performed by: INTERNAL MEDICINE

## 2018-02-14 PROCEDURE — 3008F BODY MASS INDEX DOCD: CPT | Mod: S$GLB,,, | Performed by: INTERNAL MEDICINE

## 2018-02-14 NOTE — PROGRESS NOTES
"Subjective:       Patient ID: Henrietta Villasenor is a 77 y.o. female.    Chief Complaint: Cyst (on knee)    Patient of Dr. Arriaga, last seen by PCP a month ago. Last seen by Physical Medicine for chronic arthritis pain one week ago. Presents urgently c/o new pain and swelling of lateral left knee x 3 days, denies trauma or physical activity out of the ordinary. Also c/o neck pain that radiates throughout the entire left side of her body. Between the two of these problems, not getting any sleep. Known h/o significant Cervical Spondylosis seen on neck imaging 2/16, and old TKR left knee last evaluated by Orthopedics 10/12. No associated weakness of limbs, and no edema of the painful leg. No fever, chills, sweats.     Past history includes HTN, DM with neuropathy, Chol, Atherosclerosis, CKD, MARLON, Osteoarthritis spine, knees.  Allergies: reviewed.   Medications: reviewed, includes Gabapentin 100mg, Duloxetine 60mg, Percocet 10/325mg and Tizanidine 4mg for pain.        Review of Systems   Constitutional: Negative for chills, diaphoresis and fever.   Respiratory: Negative for cough, chest tightness and shortness of breath.    Cardiovascular: Negative for chest pain, palpitations and leg swelling.   Gastrointestinal: Negative for abdominal pain, nausea and vomiting.   Musculoskeletal: Positive for arthralgias, back pain, myalgias and neck pain. Negative for gait problem.   Skin: Negative for color change, rash and wound.   Neurological: Negative for dizziness, facial asymmetry, speech difficulty, weakness and headaches.       Objective:    /90, Pulse 72, Ht 5' 8", Wt 227.8 lbs, BMI=34.6  Physical Exam   Constitutional: She is oriented to person, place, and time. No distress.   Ambulatory with cane.   Neck: Normal range of motion. Neck supple.   No point tenderness along the spine, no spinal deformity, full range of motion with no apparent discomfort at this time.    Cardiovascular: Normal rate, regular rhythm and " normal heart sounds.    Pulmonary/Chest: Effort normal and breath sounds normal. No respiratory distress. She has no wheezes. She has no rales.   Musculoskeletal: She exhibits no edema.   Left knee with no obvious deformity, old incision well healed, no effusion, erythema, increased warmth. There is a localized swelling anabell-lateral aspect about 2cm diameter with tenderness, but full range of motion without increased pain on movement. Skin is normal with no rash, induration. I do not suspect infection.   Neurological: She is alert and oriented to person, place, and time.   Skin: Skin is warm and dry. She is not diaphoretic.       Assessment:       1. Acute pain of left knee    2. Status post total left knee replacement    3. Osteoarthritis of cervical spine, unspecified spinal osteoarthritis complication status        Plan:       Acute pain of left knee  Status post total left knee replacement  -     X-Ray Knee 1 or 2 View Left; Future; Expected date: 02/14/2018 to assess the integrity of the prosthesis.  -     Continue current pain meds, no additional prescriptions given today. No NSAIDS due to comorbid conditions.   -     Ice compress TID.   -     Follow up with Orthopedics if no improvement.     Osteoarthritis of cervical spine, unspecified spinal osteoarthritis complication status        -     Continue current meds, f/u with Physical Medicine, repeat imaging not indicated at this time.

## 2018-02-18 ENCOUNTER — TELEPHONE (OUTPATIENT)
Dept: INTERNAL MEDICINE | Facility: CLINIC | Age: 78
End: 2018-02-18

## 2018-02-20 ENCOUNTER — NURSE TRIAGE (OUTPATIENT)
Dept: ADMINISTRATIVE | Facility: CLINIC | Age: 78
End: 2018-02-20

## 2018-02-20 NOTE — TELEPHONE ENCOUNTER
Reason for Disposition   Patient wants to be seen    Protocols used:  DIZZINESS-A-OH    Ms. Villasenor states she has been experiencing intermittent dizziness. Patient is requesting an appointment Dr. Arriaga only.

## 2018-02-22 ENCOUNTER — OFFICE VISIT (OUTPATIENT)
Dept: INTERNAL MEDICINE | Facility: CLINIC | Age: 78
End: 2018-02-22
Payer: MEDICARE

## 2018-02-22 VITALS
HEART RATE: 76 BPM | OXYGEN SATURATION: 97 % | SYSTOLIC BLOOD PRESSURE: 158 MMHG | HEIGHT: 68 IN | WEIGHT: 227.81 LBS | BODY MASS INDEX: 34.53 KG/M2 | DIASTOLIC BLOOD PRESSURE: 98 MMHG

## 2018-02-22 DIAGNOSIS — K21.9 GASTROESOPHAGEAL REFLUX DISEASE WITHOUT ESOPHAGITIS: ICD-10-CM

## 2018-02-22 DIAGNOSIS — I10 ESSENTIAL HYPERTENSION: Chronic | ICD-10-CM

## 2018-02-22 DIAGNOSIS — E78.5 HYPERLIPIDEMIA, UNSPECIFIED HYPERLIPIDEMIA TYPE: ICD-10-CM

## 2018-02-22 DIAGNOSIS — R42 DIZZINESS: Primary | ICD-10-CM

## 2018-02-22 PROCEDURE — 1159F MED LIST DOCD IN RCRD: CPT | Mod: S$GLB,,, | Performed by: INTERNAL MEDICINE

## 2018-02-22 PROCEDURE — 99214 OFFICE O/P EST MOD 30 MIN: CPT | Mod: S$GLB,,, | Performed by: INTERNAL MEDICINE

## 2018-02-22 PROCEDURE — 3008F BODY MASS INDEX DOCD: CPT | Mod: S$GLB,,, | Performed by: INTERNAL MEDICINE

## 2018-02-22 PROCEDURE — 99999 PR PBB SHADOW E&M-EST. PATIENT-LVL III: CPT | Mod: PBBFAC,,, | Performed by: INTERNAL MEDICINE

## 2018-02-22 PROCEDURE — 99499 UNLISTED E&M SERVICE: CPT | Mod: S$GLB,,, | Performed by: INTERNAL MEDICINE

## 2018-02-22 PROCEDURE — 1126F AMNT PAIN NOTED NONE PRSNT: CPT | Mod: S$GLB,,, | Performed by: INTERNAL MEDICINE

## 2018-02-22 RX ORDER — OXYCODONE AND ACETAMINOPHEN 10; 325 MG/1; MG/1
1 TABLET ORAL
Qty: 150 TABLET | Refills: 0 | Status: SHIPPED | OUTPATIENT
Start: 2018-03-22 | End: 2018-03-26 | Stop reason: SDUPTHER

## 2018-02-22 RX ORDER — OXYCODONE AND ACETAMINOPHEN 10; 325 MG/1; MG/1
1 TABLET ORAL
Qty: 150 TABLET | Refills: 0 | Status: SHIPPED | OUTPATIENT
Start: 2018-02-22 | End: 2018-04-19

## 2018-02-22 NOTE — PROGRESS NOTES
CHIEF COMPLAINT: Dizziness and headache     HISTORY OF PRESENT ILLNESS: This is a 77-year-old woman who presents dizziness and headaches for the last week.  Dizzines is 7/10 right now. Dizziness is worse with change in position of her head. Dizziness is worse in the morning when she first gets up. She is off balance when she walks. Headaches come and go throughout the day.  Headaches currently moderate.  She can get nauseated. She is not nauseated currently.  No vomiting.  She took an advil for the headache which helped. No sinus congestion, sore throat.  She feels hot and cold at times. She has clear drainage from her nose the last several days. NO cough. She has been doing albuterol neb daily.  She continues to take loratadine 10 mg daily and flonase daily.    Heartburn is controlled. She is taking pantoprazole 40 mg twice daily and ranitidine 300 mg at night.  She is taking dicyclomine 10 mg three times daily as needed which helps somewhat.   Symptoms occur 1-2 times a month.      She saw Dr Le 1/31/17 and [facility] Her back is doing a little better. She is now walking without a cane. She has occasional left hip pain.  She stopped the gabapentin a week ago. She was taking gabapentin 100 mg 1 capsule three times daily and 3 at bedtime. She did not like how the gabapentin made her feel.      She has been out of the oxycodone for 3-4 days. Headaches and dizziness have been worse since she has been out of the oxycodone. She was having to take oxycodone apap 5/325  2 tablets at at time because I gave her the wrong dose (5/325 instead of the 10/325) Her back has been hurting since she has been out of the oxycodone. Back pain is severe today out of the oxycodone       She continues to take aspirin 81 mg daily, coreg 25 mg twice daily and lasix 40 mg twice daily for her diastolic dysfunction and hypertension. She denies lightheadedness. No chest pain or shortness of breath. She  "is now taking atorvastatin 40 mg once daily for her cholesterol.      She is taking lantus 60 units at night and Humalog 23 units with meals. She is out of strips. She has not been checking her blood sugars. She is waiting for a prescription from [facility].  No hypoglycemia.       PAST MEDICAL HISTORY:   1. Diabetes mellitus   2. Hypertension   3. Hyperlipidemia   4. Left heart catheterization January 2007 which revealed luminal irregularities in the LAD, left circumflex and right coronary artery. She had diastolic dysfunction and patent renal arteries.   5. Sleep apnea   6. Obesity.   7. Nephrolithiasis status post lithotripsy.   8. Reflux - had nonerosive gastropathy on EGD September 2007. Gastritis on EGD 9/2010  9. Hidradenitis suppurativa.   10. History of diverticulitis with a hospitalized October 2007.   11. Migraines   12. Obesity.   13. 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    14. Total hysterectomy in 1969.   15. Cholecystectomy was done at that time as well.   16. 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   17. Colon polyps on colonscopy 11/2010 - due 2013   18. Hospitalization 12/10 for e coli sepsis due to left ureteral stone with left nephrostomy tube in [place], [place]   19. Left knee replacement 9/2012      MEDICATIONS and ALLERGIES: Updated on EPIC.     PHYSICAL EXAMINATION:   BP (!) 158/98 (BP Location: Left arm, Patient Position: Sitting, BP Method: Large (Automatic))   Pulse 76   Ht 5' 8" (1.727 m)   Wt 103.3 kg (227 lb 12.8 oz)   LMP  (LMP Unknown)   SpO2 97%   BMI 34.64 kg/m²      GENERAL: She is alert, oriented, no apparent distress. Affect within   normal limits.  Conjunctiva anicteric. . Oropharynx clear.   NECK: Supple.   Respiratory: Effort normal. Lungs clear  HEART: Regular rate and rhythm without murmurs, gallops or rubs.   No lower extremity edema.       ASSESSMENT AND PLAN:   1. Dizziness and Headaches - " suspect due to withdrawal of oxycodone. Given correct rx oxycodone. Let me know if does not resolve getting back on oxycodone  2. HTN - elevated- suspect due to pain and withdrawal.  3. Gerd - stable pantoprazole to 40 mg daily and  ranitidine 300 mg nightly  4. Diabetes with neuropathy- stable   5. Hyperlipidemia - on lipitor  6. Allergic rhinitis - stable  7. Diastolic congestive heart failure - stable.    8. CRI -stable  9. Obesity - discussed diet, exercise and weight loss  10. CAD -risk factor modification  11. Aortic atherosclerosis and possible mesenteric artery stenosis- risk factor modification  12. Tortuous aorta - HTN controlled  13. Colon polyps - Colonoscopy in 8/2013 - 2 polyps. Flex sig 11/2013 - mild granular mucosa.  14. hyperparathryodisism - to have labs today and see nephrology next week   Recommended colonoscopy in 8/2018. Attempted colonoscopy 11/2016 du to diarrhea which has resolved. She had poor prep with hyperplastic rectal biopsy.   MMG 1/18  Prevnar 10/15 and flu shot 11/16  I will see her back as scheduled, sooner if problems

## 2018-02-23 DIAGNOSIS — M48.062 SPINAL STENOSIS OF LUMBAR REGION WITH NEUROGENIC CLAUDICATION: ICD-10-CM

## 2018-02-23 RX ORDER — OXYCODONE AND ACETAMINOPHEN 10; 325 MG/1; MG/1
1 TABLET ORAL
Qty: 150 TABLET | Refills: 0 | Status: CANCELLED | OUTPATIENT
Start: 2018-02-23

## 2018-02-23 NOTE — TELEPHONE ENCOUNTER
"----- Message from Jennifer Cardenas sent at 2/23/2018  8:36 AM CST -----  Contact: Annette/Mendel/ [phone]  RX request - refill or new RX.  Is this a refill or new RX:    RX name and strength: oxyCODONE-acetaminophen (PERCOCET)  mg per tablet 150 tablet   Directions:   Is this a 30 day or 90 day RX:    Pharmacy name and phone # (DON'T enter "on file" or "in chart"):   Comments:  Please call pharmacy about the dosage and length of therapy.      "

## 2018-02-23 NOTE — TELEPHONE ENCOUNTER
----- Message from Keyla Verduzco sent at 2/23/2018  1:16 PM CST -----  Contact: self/[phone]  Pt called in regards to checking the status of her Rx for oxyCODONE-acetaminophen (PERCOCET)  mg per tablet.      [facility]  Please advise

## 2018-02-23 NOTE — TELEPHONE ENCOUNTER
----- Message from Maria R Lyndacarmelina sent at 2/23/2018  9:53 AM CST -----  Contact: Patient [phone]  Rx Name:oxyCODONE-acetaminophen (PERCOCET)  mg per tablet    Refill: yes    Pharmacy: [facility] [address] [place] [address] [address] AT [facility]    Comments: pharmacy said they need a number    Please call and advise.    Thank You

## 2018-03-15 NOTE — H&P
History    Chief complaint:  Painless progressive vision loss    Present Ilness/Diagnosis: Nuclear sclerotic Cataract    Past Medical History:  has a past medical history of Abnormality of lung (11/08/2011); Anxiety; Arthritis; CAD (coronary artery disease); Calculus of ureter (2/22/2011); Cataract; CHF (congestive heart failure); Chronic back pain (7/29/2012); Colon polyp (9/2010; 11/2010); Colon polyps (7/29/2012); Coronary artery disease involving native coronary artery of native heart with angina pectoris; Depression; Diabetes mellitus; Diabetes mellitus type II; Diverticulitis large intestine (7/27/2015); Diverticulosis (09/25/2010; 11/02/2010; 11/08/2011; 7/29/2012); Duodenal disorder (08/25/2011); Duodenal ulcer, unspecified as acute or chronic, without hemorrhage, perforation, or obstruction (8/24/2011); E. 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); 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); Obesity; MARLON (obstructive sleep apnea); Pain; Peripheral neuropathy; Pneumonia; Polyneuropathy; Postherpetic neuralgia; Recurrent nephrolithiasis; S/P knee replacement (10/2/2012); S/P TKR (total knee replacement) (12/26/2012); Sensorineural hearing loss of both ears; Thyroid disease (11/08/2011); Trouble in sleeping; Type II or unspecified type diabetes mellitus with neurological manifestations, not stated as uncontrolled(250.60); Type II or unspecified type diabetes  "mellitus with peripheral circulatory disorders, not stated as uncontrolled(250.70); and Type II or unspecified type diabetes mellitus with renal manifestations, not stated as uncontrolled(250.40).    Family History/Social History: refer to chart    Allergies:   Review of patient's allergies indicates:   Allergen Reactions    Crestor [rosuvastatin]      Cramping in legs    Ezetimibe      Other reaction(s): abdominal pain, Diarrhea    Hydrocodone      Other reaction(s): Itching    Lisinopril      Other reaction(s): cough      Sulfa (sulfonamide antibiotics)      Itching and Rash      Sulfamethoxazole     Sulfamethoxazole-trimethoprim     Trimethoprim     Valsartan      Other reaction(s): Angioedema       Current Medications: No current facility-administered medications for this encounter.     Current Outpatient Prescriptions:     ACCU-CHEK FASTCLIX Misc, , Disp: , Rfl:     ACCU-CHEK SMARTVIEW TEST STRIP Strp, TEST THREE TIMES DAILY, Disp: 300 strip, Rfl: 3    albuterol (PROVENTIL) 2.5 mg /3 mL (0.083 %) nebulizer solution, Take 3 mLs (2.5 mg total) by nebulization every 6 (six) hours as needed for Wheezing. Rescue, Disp: 90 mL, Rfl: 6    ALCOHOL ANTISEPTIC PADS (ALCOHOL PREP PADS TOP), , Disp: , Rfl:     aspirin (ECOTRIN) 81 MG EC tablet, Take 1 tablet by mouth Daily., Disp: , Rfl:     atorvastatin (LIPITOR) 40 MG tablet, Take 1 tablet (40 mg total) by mouth once daily., Disp: 90 tablet, Rfl: 3    BD ULTRA-FINE OLU PEN NEEDLES 32 x 5/32 " Ndle, Uses 4 times daily, on multiple daily insulin injections, Disp: 130 each, Rfl: 12    carvedilol (COREG) 25 MG tablet, TAKE 1 TABLET TWICE DAILY, Disp: 180 tablet, Rfl: 3    cholecalciferol, vitamin D3, (VITAMIN D) 2,000 unit Cap, Take by mouth. 1 Capsule Oral Every morning, Disp: , Rfl:     diaper,brief,adult,disposable Misc, , Disp: , Rfl:     dicyclomine (BENTYL) 10 MG capsule, Take 1 capsule (10 mg total) by mouth 3 (three) times daily as needed " "(abdominal pain)., Disp: 60 capsule, Rfl: 3    duloxetine (CYMBALTA) 60 MG capsule, Take 1 capsule (60 mg total) by mouth once daily., Disp: 30 capsule, Rfl: 11    fluticasone (FLONASE) 50 mcg/actuation nasal spray, SHAKE LQ AND U 2 SPRAYS IEN QD, Disp: 1 Bottle, Rfl: 6    furosemide (LASIX) 40 MG tablet, TAKE 1 TABLET twice daily, Disp: 270 tablet, Rfl: 3    gabapentin (NEURONTIN) 100 MG capsule, TAKE 1 CAPSULE THREE TIMES DAILY  AND TAKE 3 CAPSULES AT BEDTIME, Disp: 540 capsule, Rfl: 2    insulin aspart (NOVOLOG) 100 unit/mL InPn pen, 23 units with meals plus correction scale. Max daily dose=120 units, Disp: 8 Box, Rfl: 3    insulin glargine (LANTUS SOLOSTAR) 100 unit/mL (3 mL) InPn pen, Inject 60 Units into the skin every evening., Disp: 4 Box, Rfl: 3    Lactobacillus acidophilus (PROBIOTIC) 10 billion cell Cap, Take 1 capsule by mouth once daily., Disp: , Rfl:     loratadine (CLARITIN) 10 mg tablet, Take 1 tablet (10 mg total) by mouth once daily., Disp: 90 tablet, Rfl: 4    MUSCLE RUB, WITH CAMPHOR, 4-30-10 % Crea, , Disp: , Rfl:     nitroGLYCERIN (NITROSTAT) 0.4 MG SL tablet, Place 1 tablet (0.4 mg total) under the tongue every 5 (five) minutes as needed., Disp: 25 tablet, Rfl: 2    nystatin (MYCOSTATIN) cream, Apply topically 2 (two) times daily., Disp: 120 g, Rfl: 3    ondansetron (ZOFRAN) 4 MG tablet, Take 1 tablet (4 mg total) by mouth every 8 (eight) hours as needed for Nausea., Disp: 30 tablet, Rfl: 3    [START ON 3/22/2018] oxyCODONE-acetaminophen (PERCOCET)  mg per tablet, Take 1 tablet by mouth 5 (five) times daily., Disp: 150 tablet, Rfl: 0    oxyCODONE-acetaminophen (PERCOCET)  mg per tablet, Take 1 tablet by mouth 5 (five) times daily., Disp: 150 tablet, Rfl: 0    pantoprazole (PROTONIX) 40 MG tablet, TAKE 1 TABLET(40 MG) BY MOUTH twice daily, Disp: 180 tablet, Rfl: 4    pen needle, diabetic (SURE-FINE PEN NEEDLES) 29 gauge x 1/2" Ndle, Use 4 times daily, Disp: 400 each, " Rfl: 4    promethazine (PHENERGAN) 6.25 mg/5 mL syrup, Take 5 mLs (6.25 mg total) by mouth every 6 (six) hours as needed (cough)., Disp: 118 mL, Rfl: 0    tizanidine (ZANAFLEX) 4 MG tablet, Take 1 tablet (4 mg total) by mouth nightly as needed., Disp: 30 tablet, Rfl: 3    triamcinolone acetonide 0.1% (KENALOG) 0.1 % cream, Apply topically 3 (three) times daily., Disp: 28.4 g, Rfl: 0    VENTOLIN HFA 90 mcg/actuation inhaler, INHALE 2 PUFFS INTO THE LUNGS  EVERY 4 HOURS AS NEEDED FOR ASTHMA, Disp: 18 g, Rfl: 1    Physical Exam    BP: Vital signs stable  General: No apparent distress  HEENT: nuclear sclerotic cataract  Lungs: adequate respirations  Heart: + pulses  Abdomen: soft  Rectal/pelvic: deferred    Impression: Visually significant Cataract    Plan: Phacoemulsification with implantation of Intraocular lens

## 2018-03-20 NOTE — PRE-PROCEDURE INSTRUCTIONS
PreOp Instructions given:     - Verbal medication information (what to hold and what to take)   - NPO guidelines   - Arrival place directions given; time to be given the day before procedure by the   Surgeon's Office   - Bathing with antibacterial soap   - Don't wear any jewelry or bring any valuables AM of surgery   - No makeup or moisturizer to face   - No perfume/cologne, powder, lotions or aftershave     Pt. verbalized understanding.     Denies any  history of side effects or issues with anesthesia or sedation.

## 2018-03-21 ENCOUNTER — TELEPHONE (OUTPATIENT)
Dept: OPHTHALMOLOGY | Facility: CLINIC | Age: 78
End: 2018-03-21

## 2018-03-22 ENCOUNTER — SURGERY (OUTPATIENT)
Age: 78
End: 2018-03-22

## 2018-03-22 ENCOUNTER — ANESTHESIA (OUTPATIENT)
Dept: SURGERY | Facility: HOSPITAL | Age: 78
End: 2018-03-22
Payer: MEDICARE

## 2018-03-22 ENCOUNTER — ANESTHESIA EVENT (OUTPATIENT)
Dept: SURGERY | Facility: HOSPITAL | Age: 78
End: 2018-03-22
Payer: MEDICARE

## 2018-03-22 ENCOUNTER — HOSPITAL ENCOUNTER (OUTPATIENT)
Facility: HOSPITAL | Age: 78
Discharge: HOME | End: 2018-03-22
Attending: OPHTHALMOLOGY | Admitting: OPHTHALMOLOGY
Payer: MEDICARE

## 2018-03-22 VITALS
BODY MASS INDEX: 34.1 KG/M2 | HEART RATE: 68 BPM | DIASTOLIC BLOOD PRESSURE: 76 MMHG | SYSTOLIC BLOOD PRESSURE: 148 MMHG | HEIGHT: 68 IN | WEIGHT: 225 LBS | TEMPERATURE: 98 F | OXYGEN SATURATION: 98 % | RESPIRATION RATE: 18 BRPM

## 2018-03-22 DIAGNOSIS — H25.10 SENILE NUCLEAR SCLEROSIS: ICD-10-CM

## 2018-03-22 DIAGNOSIS — H25.12 NUCLEAR SCLEROTIC CATARACT OF LEFT EYE: Primary | ICD-10-CM

## 2018-03-22 LAB
POCT GLUCOSE: 88 MG/DL (ref 70–110)
POCT GLUCOSE: 96 MG/DL (ref 70–110)

## 2018-03-22 PROCEDURE — 37000008 HC ANESTHESIA 1ST 15 MINUTES: Performed by: OPHTHALMOLOGY

## 2018-03-22 PROCEDURE — D9220A PRA ANESTHESIA: Mod: CRNA,,, | Performed by: NURSE ANESTHETIST, CERTIFIED REGISTERED

## 2018-03-22 PROCEDURE — 82962 GLUCOSE BLOOD TEST: CPT | Performed by: OPHTHALMOLOGY

## 2018-03-22 PROCEDURE — 36000707: Performed by: OPHTHALMOLOGY

## 2018-03-22 PROCEDURE — 25000003 PHARM REV CODE 250

## 2018-03-22 PROCEDURE — 25000003 PHARM REV CODE 250: Performed by: OPHTHALMOLOGY

## 2018-03-22 PROCEDURE — 36000706: Performed by: OPHTHALMOLOGY

## 2018-03-22 PROCEDURE — 71000015 HC POSTOP RECOV 1ST HR: Performed by: OPHTHALMOLOGY

## 2018-03-22 PROCEDURE — 66982 XCAPSL CTRC RMVL CPLX WO ECP: CPT | Mod: LT,,, | Performed by: OPHTHALMOLOGY

## 2018-03-22 PROCEDURE — 63600175 PHARM REV CODE 636 W HCPCS: Performed by: NURSE ANESTHETIST, CERTIFIED REGISTERED

## 2018-03-22 PROCEDURE — 63600175 PHARM REV CODE 636 W HCPCS: Performed by: OPHTHALMOLOGY

## 2018-03-22 PROCEDURE — 37000009 HC ANESTHESIA EA ADD 15 MINS: Performed by: OPHTHALMOLOGY

## 2018-03-22 PROCEDURE — V2632 POST CHMBR INTRAOCULAR LENS: HCPCS | Performed by: OPHTHALMOLOGY

## 2018-03-22 PROCEDURE — 25000003 PHARM REV CODE 250: Performed by: NURSE ANESTHETIST, CERTIFIED REGISTERED

## 2018-03-22 PROCEDURE — D9220A PRA ANESTHESIA: Mod: ANES,,, | Performed by: ANESTHESIOLOGY

## 2018-03-22 PROCEDURE — 27201423 OPTIME MED/SURG SUP & DEVICES STERILE SUPPLY: Performed by: OPHTHALMOLOGY

## 2018-03-22 DEVICE — LENS IOL ITEC PRELOAD 21.5D: Type: IMPLANTABLE DEVICE | Site: EYE | Status: FUNCTIONAL

## 2018-03-22 RX ORDER — LIDOCAINE HYDROCHLORIDE 40 MG/ML
INJECTION, SOLUTION RETROBULBAR
Status: DISCONTINUED
Start: 2018-03-22 | End: 2018-03-22 | Stop reason: HOSPADM

## 2018-03-22 RX ORDER — MIDAZOLAM HYDROCHLORIDE 1 MG/ML
INJECTION, SOLUTION INTRAMUSCULAR; INTRAVENOUS
Status: DISCONTINUED | OUTPATIENT
Start: 2018-03-22 | End: 2018-03-22

## 2018-03-22 RX ORDER — ACETAMINOPHEN 325 MG/1
650 TABLET ORAL EVERY 4 HOURS PRN
Status: DISCONTINUED | OUTPATIENT
Start: 2018-03-22 | End: 2018-03-22 | Stop reason: HOSPADM

## 2018-03-22 RX ORDER — PHENYLEPHRINE HYDROCHLORIDE 100 MG/ML
SOLUTION/ DROPS OPHTHALMIC
Status: DISCONTINUED | OUTPATIENT
Start: 2018-03-22 | End: 2018-03-22 | Stop reason: HOSPADM

## 2018-03-22 RX ORDER — FENTANYL CITRATE 50 UG/ML
INJECTION, SOLUTION INTRAMUSCULAR; INTRAVENOUS
Status: DISCONTINUED | OUTPATIENT
Start: 2018-03-22 | End: 2018-03-22

## 2018-03-22 RX ORDER — KETOROLAC TROMETHAMINE 5 MG/ML
1 SOLUTION OPHTHALMIC ONCE
Status: COMPLETED | OUTPATIENT
Start: 2018-03-22 | End: 2018-03-22

## 2018-03-22 RX ORDER — MOXIFLOXACIN 5 MG/ML
1 SOLUTION/ DROPS OPHTHALMIC
Status: COMPLETED | OUTPATIENT
Start: 2018-03-22 | End: 2018-03-22

## 2018-03-22 RX ORDER — SODIUM CHLORIDE 9 MG/ML
INJECTION, SOLUTION INTRAVENOUS CONTINUOUS
Status: DISCONTINUED | OUTPATIENT
Start: 2018-03-22 | End: 2018-03-22 | Stop reason: HOSPADM

## 2018-03-22 RX ORDER — SODIUM CHLORIDE 0.9 % (FLUSH) 0.9 %
3 SYRINGE (ML) INJECTION
Status: DISCONTINUED | OUTPATIENT
Start: 2018-03-22 | End: 2018-03-22 | Stop reason: HOSPADM

## 2018-03-22 RX ORDER — PROPARACAINE HYDROCHLORIDE 5 MG/ML
1 SOLUTION/ DROPS OPHTHALMIC
Status: DISCONTINUED | OUTPATIENT
Start: 2018-03-22 | End: 2018-03-22 | Stop reason: HOSPADM

## 2018-03-22 RX ORDER — EPINEPHRINE 1 MG/ML
INJECTION, SOLUTION INTRACARDIAC; INTRAMUSCULAR; INTRAVENOUS; SUBCUTANEOUS
Status: DISCONTINUED
Start: 2018-03-22 | End: 2018-03-22 | Stop reason: HOSPADM

## 2018-03-22 RX ORDER — PHENYLEPHRINE HYDROCHLORIDE 100 MG/ML
SOLUTION/ DROPS OPHTHALMIC
Status: DISCONTINUED
Start: 2018-03-22 | End: 2018-03-22 | Stop reason: HOSPADM

## 2018-03-22 RX ORDER — LIDOCAINE HYDROCHLORIDE 10 MG/ML
INJECTION, SOLUTION EPIDURAL; INFILTRATION; INTRACAUDAL; PERINEURAL
Status: COMPLETED
Start: 2018-03-22 | End: 2018-03-22

## 2018-03-22 RX ORDER — LIDOCAINE HYDROCHLORIDE 40 MG/ML
INJECTION, SOLUTION RETROBULBAR
Status: DISCONTINUED | OUTPATIENT
Start: 2018-03-22 | End: 2018-03-22 | Stop reason: HOSPADM

## 2018-03-22 RX ORDER — EPINEPHRINE 1 MG/ML
INJECTION, SOLUTION INTRACARDIAC; INTRAMUSCULAR; INTRAVENOUS; SUBCUTANEOUS
Status: DISCONTINUED | OUTPATIENT
Start: 2018-03-22 | End: 2018-03-22 | Stop reason: HOSPADM

## 2018-03-22 RX ORDER — PHENYLEPHRINE HYDROCHLORIDE 25 MG/ML
1 SOLUTION/ DROPS OPHTHALMIC
Status: DISCONTINUED | OUTPATIENT
Start: 2018-03-22 | End: 2018-03-22 | Stop reason: HOSPADM

## 2018-03-22 RX ORDER — SODIUM CHLORIDE 9 MG/ML
INJECTION, SOLUTION INTRAVENOUS CONTINUOUS PRN
Status: DISCONTINUED | OUTPATIENT
Start: 2018-03-22 | End: 2018-03-22

## 2018-03-22 RX ORDER — LIDOCAINE HYDROCHLORIDE 10 MG/ML
1 INJECTION, SOLUTION EPIDURAL; INFILTRATION; INTRACAUDAL; PERINEURAL ONCE
Status: COMPLETED | OUTPATIENT
Start: 2018-03-22 | End: 2018-03-22

## 2018-03-22 RX ORDER — TROPICAMIDE 10 MG/ML
1 SOLUTION/ DROPS OPHTHALMIC
Status: DISCONTINUED | OUTPATIENT
Start: 2018-03-22 | End: 2018-03-22 | Stop reason: HOSPADM

## 2018-03-22 RX ORDER — PREDNISOLONE ACETATE 10 MG/ML
SUSPENSION/ DROPS OPHTHALMIC
Status: DISCONTINUED
Start: 2018-03-22 | End: 2018-03-22 | Stop reason: HOSPADM

## 2018-03-22 RX ORDER — LIDOCAINE HYDROCHLORIDE 10 MG/ML
INJECTION, SOLUTION EPIDURAL; INFILTRATION; INTRACAUDAL; PERINEURAL
Status: DISCONTINUED | OUTPATIENT
Start: 2018-03-22 | End: 2018-03-22 | Stop reason: HOSPADM

## 2018-03-22 RX ORDER — PREDNISOLONE ACETATE 10 MG/ML
SUSPENSION/ DROPS OPHTHALMIC
Status: DISCONTINUED | OUTPATIENT
Start: 2018-03-22 | End: 2018-03-22 | Stop reason: HOSPADM

## 2018-03-22 RX ADMIN — LIDOCAINE HYDROCHLORIDE 0.1 MG: 10 INJECTION, SOLUTION EPIDURAL; INFILTRATION; INTRACAUDAL; PERINEURAL at 07:03

## 2018-03-22 RX ADMIN — MOXIFLOXACIN HYDROCHLORIDE 1 DROP: 5 SOLUTION/ DROPS OPHTHALMIC at 07:03

## 2018-03-22 RX ADMIN — FENTANYL CITRATE 25 MCG: 50 INJECTION, SOLUTION INTRAMUSCULAR; INTRAVENOUS at 07:03

## 2018-03-22 RX ADMIN — KETOROLAC TROMETHAMINE 1 DROP: 5 SOLUTION OPHTHALMIC at 06:03

## 2018-03-22 RX ADMIN — PHENYLEPHRINE HYDROCHLORIDE 1 DROP: 25 SOLUTION/ DROPS OPHTHALMIC at 06:03

## 2018-03-22 RX ADMIN — LIDOCAINE HYDROCHLORIDE 2 ML: 10 INJECTION, SOLUTION EPIDURAL; INFILTRATION; INTRACAUDAL; PERINEURAL at 07:03

## 2018-03-22 RX ADMIN — MIDAZOLAM HYDROCHLORIDE 0.5 MG: 1 INJECTION, SOLUTION INTRAMUSCULAR; INTRAVENOUS at 07:03

## 2018-03-22 RX ADMIN — TROPICAMIDE 1 DROP: 10 SOLUTION/ DROPS OPHTHALMIC at 07:03

## 2018-03-22 RX ADMIN — SODIUM CHLORIDE: 0.9 INJECTION, SOLUTION INTRAVENOUS at 07:03

## 2018-03-22 RX ADMIN — LIDOCAINE HYDROCHLORIDE 5 ML: 40 INJECTION, SOLUTION RETROBULBAR; TOPICAL at 07:03

## 2018-03-22 RX ADMIN — EPINEPHRINE 0.3 MG: 1 INJECTION, SOLUTION INTRAMUSCULAR; SUBCUTANEOUS at 07:03

## 2018-03-22 RX ADMIN — PREDNISOLONE ACETATE 1 DROP: 10 SUSPENSION/ DROPS OPHTHALMIC at 07:03

## 2018-03-22 RX ADMIN — PHENYLEPHRINE HYDROCHLORIDE 1 DROP: 25 SOLUTION/ DROPS OPHTHALMIC at 07:03

## 2018-03-22 RX ADMIN — TROPICAMIDE 1 DROP: 10 SOLUTION/ DROPS OPHTHALMIC at 06:03

## 2018-03-22 RX ADMIN — SODIUM CHONDROITIN SULFATE / SODIUM HYALURONATE 1.05 ML: 0.55-0.5 INJECTION INTRAOCULAR at 07:03

## 2018-03-22 RX ADMIN — MOXIFLOXACIN HYDROCHLORIDE 1 DROP: 5 SOLUTION/ DROPS OPHTHALMIC at 06:03

## 2018-03-22 RX ADMIN — PROPARACAINE HYDROCHLORIDE 1 DROP: 5 SOLUTION/ DROPS OPHTHALMIC at 06:03

## 2018-03-22 RX ADMIN — PHENYLEPHRINE HYDROCHLORIDE 3 DROP: 100 SOLUTION/ DROPS OPHTHALMIC at 07:03

## 2018-03-22 NOTE — TRANSFER OF CARE
"Anesthesia Transfer of Care Note    Patient: Henrietta Villasenor    Procedure(s) Performed: Procedure(s) (LRB):  PHACOEMULSIFICATION-ASPIRATION-CATARACT (Left)  INSERTION-INTRAOCULAR LENS (IOL) (Left)    Patient location: [facility]    Anesthesia Type: MAC    Transport from OR: Transported from OR on room air with adequate spontaneous ventilation    Post pain: adequate analgesia    Post assessment: no apparent anesthetic complications and tolerated procedure well    Post vital signs: stable    Level of consciousness: awake and alert    Nausea/Vomiting: no vomiting    Complications: none    Transfer of care protocol was followed      Last vitals:   Visit Vitals  BP (!) 153/73   Pulse 65   Temp 36.6 °C (97.9 °F) (Temporal)   Resp 18   Ht 5' 8" (1.727 m)   Wt 102.1 kg (225 lb)   LMP  (LMP Unknown)   SpO2 96%   Breastfeeding? No   BMI 34.21 kg/m²     " Cc: Right hemiplegia, gait dysfunction, dysphagia    HPI:  Patient tolerating therapies.  Mod A with transfers.    MEDICATIONS  (STANDING):  aspirin  chewable 81 milliGRAM(s) Oral daily  atorvastatin 40 milliGRAM(s) Oral at bedtime  enalapril 10 milliGRAM(s) Oral <User Schedule>  enoxaparin Injectable 30 milliGRAM(s) SubCutaneous <User Schedule>  levETIRAcetam  Solution 1000 milliGRAM(s) Oral two times a day  nicotine -  14 mG/24Hr(s) Patch 1 patch Transdermal daily  sodium chloride 2 Gram(s) Oral every 8 hours  verapamil 40 milliGRAM(s) Oral three times a day    MEDICATIONS  (PRN):  acetaminophen    Suspension. 650 milliGRAM(s) Oral every 6 hours PRN Mild Pain (1 - 3)  ALBUTerol    90 MICROgram(s) HFA Inhaler 1 Puff(s) Inhalation every 4 hours PRN Wheezing  ipratropium 17 MICROgram(s) HFA Inhaler 1 Puff(s) Inhalation every 6 hours PRN wheez    Vital Signs Last 24 Hrs  T(C): 36.9 (09 Feb 2018 12:00), Max: 36.9 (09 Feb 2018 12:00)  T(F): 98.4 (09 Feb 2018 12:00), Max: 98.4 (09 Feb 2018 12:00)  HR: 60 (09 Feb 2018 12:00) (58 - 68)  BP: 175/75 (09 Feb 2018 12:00) (158/80 - 203/101)  BP(mean): --  RR: 18 (09 Feb 2018 12:00) (18 - 18)  SpO2: 95% (09 Feb 2018 12:00) (94% - 98%)    PHYSICAL EXAM  Constitutional - NAD, Comfortable  HEENT - inc intact  Extremities - No C/C/E, No calf tenderness   Neurologic Exam -                    Cognitive - Awake, Alert,      Communication - + aphasia   Cranial Nerves - CN 2-12 intact     Motor - Right side 0/5, Left side at least antigravity                    Right neglect     Impression:  60 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

## 2018-03-22 NOTE — ANESTHESIA PREPROCEDURE EVALUATION
03/22/2018  Henrietta Villasenor is a 78 y.o., female.  Pre-operative evaluation for Procedure(s):  PHACOEMULSIFICATION-ASPIRATION-CATARACT (Left Eye)      INSERTION-INTRAOCULAR LENS (IOL) (Left Eye)      Henrietta Villasenor is a 78 y.o. female    Allergies   Allergen Reactions    Crestor [Rosuvastatin]      Cramping in legs    Ezetimibe      Other reaction(s): abdominal pain, Diarrhea    Hydrocodone      Other reaction(s): Itching    Lisinopril      Other reaction(s): cough      Sulfa (Sulfonamide Antibiotics)      Itching and Rash      Sulfamethoxazole     Sulfamethoxazole-Trimethoprim     Trimethoprim     Valsartan      Other reaction(s): Angioedema       Patient Active Problem List   Diagnosis    Essential hypertension    MARLON on CPAP    Nephrolithiasis    Gastroesophageal reflux disease without esophagitis    Hydradenitis    Migraines, neuralgic    Chronic back pain    Unspecified ptosis of eyelid    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    Neuralgia, neuritis, and radiculitis, unspecified    Chronic kidney disease, unspecified    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    Dermatitis due to drugs and medicines taken internally(693.0)    Dermatochalasis    Diverticulosis of large intestine    Personal history of allergy to sulfonamides    Diastolic congestive heart failure    Knee pain    Anemia    Chronic right ear pain    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    Hearing loss, sensorineural    Stage 3 chronic kidney disease    Coronary artery disease involving native coronary artery of native heart with angina pectoris    Lactose intolerance    Allergic rhinitis    Hyperlipidemia    Aortic atherosclerosis    Spinal enthesopathy of lumbar region    LLQ abdominal pain    Diverticulitis of large intestine without perforation or abscess  without bleeding    Esophageal dysphagia    Fatty liver    Painful swallowing    Left facial numbness    Neck pain on left side    Insulin dependent diabetes mellitus    Shortness of breath    Diabetic neuropathy associated with type 2 diabetes mellitus    Type 2 diabetes mellitus with stage 3 chronic kidney disease, with long-term current use of insulin    Obesity (BMI 30.0-34.9)    Decreased strength of trunk and back    Low hemoglobin and low hematocrit    History of colon polyps    Type 2 diabetes mellitus with diabetic polyneuropathy, with long-term current use of insulin    Hyperparathyroidism    Obesity, diabetes, and hypertension syndrome    Spinal stenosis of lumbar region with neurogenic claudication    Senile nuclear sclerosis       Past Surgical History:   Procedure Laterality Date    BELPHAROPTOSIS REPAIR      s/p LAMBERTO levator repair - Dr. Dejesus    CARDIAC CATHETERIZATION      CARPAL TUNNEL RELEASE  3/13/2012    CHOLECYSTECTOMY      COLONOSCOPY N/A 11/16/2016    Procedure: COLONOSCOPY;  Surgeon: Chris Storey MD;  Location: [facility] ENDO (4TH FLR);  Service: Endoscopy;  Laterality: N/A;    COLONOSCOPY N/A 6/14/2017    Procedure: COLONOSCOPY;  Surgeon: Chris Storey MD;  Location: [facility] ENDO ([facility] FLR);  Service: Endoscopy;  Laterality: N/A;  colonoscopy in 3 months with better bowel prep. - Split PEG prep ordered    EXTRACORPOREAL SHOCK WAVE LITHOTRIPSY      HYSTERECTOMY      mastoid tumor removal  1969    Left mastoidectomy with residual left facial weakness.    NEPHROSTOMY      OOPHORECTOMY      TOTAL KNEE ARTHROPLASTY  9/13/2012    Left           No results for input(s): HCT in the last 72 hours.  No results for input(s): PLT in the last 72 hours.  No results for input(s): K in the last 72 hours.  No results for input(s): CREATININE in the last 72 hours.  No results for input(s): GLU in the last 72 hours.  No results for input(s): PT in the last 72 hours.    Pre-op  Assessment         Review of Systems  Anesthesia Hx:  No problems with previous Anesthesia    Social:  Non-Smoker, No Alcohol Use    Hematology/Oncology:     Oncology Normal    -- Anemia:   Cardiovascular:   Hypertension, well controlled Past MI (2011) CAD asymptomatic   Denies Angina.    Pulmonary:   COPD, mild Shortness of breath waks one hour in store with one or two rest stops due to back pain, not SOB   Renal/:   Chronic Renal Disease, CRI    Neurological:   Denies TIA. Denies CVA. Denies Seizures. RIGHT facial droop since mastoid tumor 1969   Endocrine:   Diabetes, type 2, using insulin        Physical Exam  General:  Well nourished    Airway/Jaw/Neck:  Airway Findings: Mouth Opening: Normal Tongue: Normal  General Airway Assessment: Adult, Average  Mallampati: II  TM Distance: Normal, at least 6 cm  Jaw/Neck Findings:  Neck ROM: Normal ROM        Heart/Vascular:  Heart Findings: Rate: Normal  Rhythm: Regular Rhythm        Mental Status:  Mental Status Findings:  Cooperative, Alert and Oriented         Anesthesia Plan  Type of Anesthesia, risks & benefits discussed:  Anesthesia Type:  general  Patient's Preference:   Intra-op Monitoring Plan:   Intra-op Monitoring Plan Comments:   Post Op Pain Control Plan:   Post Op Pain Control Plan Comments: As per surgeon's plan  Induction:   IV  Beta Blocker:  Patient is on a Beta-Blocker and has received one dose within the past 24 hours (No further documentation required).       Informed Consent: Patient understands risks and agrees with Anesthesia plan.  Questions answered. Anesthesia consent signed with patient.  ASA Score: 3     Day of Surgery Review of History & Physical:    H&P update referred to the surgeon.         Ready For Surgery From Anesthesia Perspective.                                                                                                                  03/22/2018  Henrietta Villasenor is a 78 y.o., female.    Anesthesia Evaluation    I have  reviewed the Patient Summary Reports.     I have reviewed the Medications.     Review of Systems  Anesthesia Hx:  No problems with previous Anesthesia Denies Hx of Anesthetic complications  History of prior surgery of interest to airway management or planning:  Denies Personal Hx of Anesthesia complications.   Social:  Former Smoker, No Alcohol Use    Hematology/Oncology:  Hematology Normal   Oncology Normal     Cardiovascular:   Hypertension Past MI CAD asymptomatic   Denies Angina. CHF    Pulmonary:   Denies Shortness of breath. Sleep Apnea, CPAP    Renal/:   Chronic Renal Disease    Hepatic/GI:   PUD, GERD, well controlled Liver Disease,    Neurological:   Headaches    Endocrine:   Diabetes    Psych:   depression          Physical Exam  General:  Obesity    Airway/Jaw/Neck:  Airway Findings: Mouth Opening: Normal Tongue: Large  General Airway Assessment: Adult  Mallampati: IV  TM Distance: Normal, at least 6 cm  Jaw/Neck Findings:  Neck ROM: Normal ROM      Dental:  Dental Findings: Edentulous   Chest/Lungs:  Chest/Lungs Findings: Clear to auscultation, Normal Respiratory Rate     Heart/Vascular:  Heart Findings: Rate: Normal  Rhythm: Regular Rhythm  Sounds: Normal        Mental Status:  Mental Status Findings:  Cooperative, Alert and Oriented         Anesthesia Plan  Type of Anesthesia, risks & benefits discussed:  Anesthesia Type:  MAC, general  Patient's Preference:   Intra-op Monitoring Plan: standard ASA monitors  Intra-op Monitoring Plan Comments:   Post Op Pain Control Plan:   Post Op Pain Control Plan Comments:   Induction:   IV  Beta Blocker:  Patient is on a Beta-Blocker and has received one dose within the past 24 hours (No further documentation required).       Informed Consent: Patient understands risks and agrees with Anesthesia plan.  Questions answered. Anesthesia consent signed with patient.  ASA Score: 3     Day of Surgery Review of History & Physical:    H&P update referred to the surgeon.          Ready For Surgery From Anesthesia Perspective.

## 2018-03-22 NOTE — PLAN OF CARE
Discharge instructions reviewed with patient. Verbalizes understanding. Copies of instructions given to patient. Tolerating po fluids. Denies pain or nausea. Safety maintained.

## 2018-03-22 NOTE — DISCHARGE SUMMARY
BRIEF DISCHARGE NOTE:    Reason for hospitalization -  Cataract surgery     Final Diagnosis - Visually significant Cataract    Procedures and treatment provided - Status post phacoemulsification with placement of intraocular lens     Diet - Advance to regular as tolerated    Activity - as tolerated    Disposition at the end of the case - Good.    Discharge: to home    The patient tolerated the procedure well and knows to follow up with me tomorrow morning in the eye clinic, sooner if needed.    Patient and family instructions (as appropriate) - Given to patient on discharge    Keysha Valle MD

## 2018-03-22 NOTE — INTERVAL H&P NOTE
The patient has been examined and the H&P has been reviewed:    I concur with the findings and no changes have occurred since H&P was written.    Anesthesia/Surgery risks, benefits and alternative options discussed and understood by patient/family.          Active Hospital Problems    Diagnosis  POA    Senile nuclear sclerosis [H25.10]  Yes      Resolved Hospital Problems    Diagnosis Date Resolved POA   No resolved problems to display.

## 2018-03-22 NOTE — DISCHARGE INSTRUCTIONS
Discharge Instructions for Cataract Surgery  A surgeon removed the cloudy lens in your eye and replaced it with a clear man-made lens. Be sure to have an adult family member or friend drive you home after surgery. Heres what you can expect following surgery and tips for a healthy recovery.  It is normal to have the following:  · Bruised or bloodshot eye for 7 days  · Itching and mild discomfort for several days  · Some fluid discharge  · Sensitivity to light  · Scratchy, sandlike feeling in the eye for 2 weeks  · Feeling tired, especially during the first 24 hours  Activity level  · Do not drive for 2 days or as instructed by your doctor.  · Do not drink alcohol for at least 24 hours.  · Avoid bending at the waist to  objects or lifting anything heavy for 2 days.  · Relax for the first 24 hours after surgery. Watching TV and reading are OK and wont harm your eye.  Eye protection  · Do not rub or press on your eye.  · Sleep on your back or on your unoperated side for 2 nights.  · If instructed, wear a bandage over your eye for 2 days and 2 nights.  · If instructed, wear a shield to protect your eye for 2 days and 2 nights.  Using eyedrops  You may be given special eyedrops or ointment. Here is one way to use eyedrops:  · Tilt your head back.  · Pull your bottom eyelid down.  · Squeeze one drop into your eye. Do not touch your eye with the bottle tip.  · Close your eyes for a few seconds.  · If you need more than one drop, wait a few minutes before adding the next one.   Call your doctor right away if you have any of the following:  · Bleeding or discharge from the eye  · Your vision suddenly becomes worse  · Pain medicine you are told to take does not ease your pain  · Nausea or vomiting  · Chills or fever over 100.4°F (39.1°C)   Date Last Reviewed: 5/30/2015  © 9061-1303 [employer]. [address] [place] [address], [place] [address]. All rights reserved. This information is not intended as a  substitute for professional medical care. Always follow your healthcare professional's instructions.

## 2018-03-22 NOTE — OP NOTE
DATE OF PROCEDURE: 03/22/2018    SURGEON: BLOSSOM ZAFAR MD    PREOPERATIVE DIAGNOSIS:  1. Senile nuclear sclerotic cataract left eye. 2. Poor mydriasis secondary to floppy iris syndrome left eye    POSTOPERATIVE DIAGNOSIS: 1.Senile nuclear sclerotic cataract left eye. 2. Poor mydriasis secondary to floppy iris syndrome left eye    PROCEDURE PERFORMED:  Complex phacoemulsification with placement of intraocular lens, left eye, with placement of a Malyugin ring.    IMPLANT:  PCBOO 21.5    ANESTHESIA:  Topical and MAC    COMPLICATIONS: none    ESTIMATED BLOOD LOSS: <1cc    SPECIMENS: none    INDICATIONS FOR PROCEDURE:  This patient presented to the clinic with decreased vision in the left eye and was found to have a cataract.  The risks, benefits, and alternatives were discussed and the patient agreed to proceed with phacoemulsification and implantation of a lens in the left eye.     PROCEDURE IN DETAIL:  The patient was met in the preop holding area.  Consent was confirmed to be signed.  The operative site was marked.  The patient was brought into the operating room by the anesthesia team and placed under monitored anesthesia care.  The left eye was prepped and draped in a sterile ophthalmic fashion.  A Gerri speculum was placed into the left eye.   A paracentesis site was made and 1% preservative-free lidocaine was injected into the anterior chamber.  Viscoelastic  material was injected into the anterior chamber.  A keratome blade was used to make a clear corneal incision. The malyugin ring was inserted and positioned into place, assisting with pupillary dilation.  A cystotome was used to initiate the continuous curvilinear capsulorrhexis which was completed with Utrata forceps.  BSS on a bruce cannula was used to perform hydrodissection.  The phacoemulsification tip was introduced into the eye and the nucleus was removed in a standard divide-and-conquer fashion.  Remaining cortical material was removed from the  eye using irrigation-aspiration.  The capsular bag was filled with viscoelastic material and the intraocular lens was injected and positioned into place. The Malyugin ring was removed from the eye. Remaining viscoelastic material was removed from the eye using irrigation and aspiration.  The corneal wounds were hydrated.  The eye was filled to physiologic pressure. The wounds were found to be watertight. Drops of Vigamox and prednisilone were placed into the eye.  The eye was washed, dried, and shielded.  The patient tolerated the procedure well and knows to follow up with me tomorrow morning, sooner if needed.

## 2018-03-22 NOTE — ANESTHESIA POSTPROCEDURE EVALUATION
"Anesthesia Post Evaluation    Patient: Henrietta Villasenor    Procedure(s) Performed: Procedure(s) (LRB):  PHACOEMULSIFICATION-ASPIRATION-CATARACT (Left)  INSERTION-INTRAOCULAR LENS (IOL) (Left)    Final Anesthesia Type: MAC  Patient location during evaluation: PACU  Patient participation: Yes- Able to Participate  Level of consciousness: awake and alert and oriented  Post-procedure vital signs: reviewed and stable  Pain management: adequate  Airway patency: patent  PONV status at discharge: No PONV  Anesthetic complications: no      Cardiovascular status: blood pressure returned to baseline and hemodynamically stable  Respiratory status: unassisted, spontaneous ventilation and room air  Hydration status: euvolemic  Follow-up not needed.        Visit Vitals  BP (!) 148/76   Pulse 68   Temp 36.6 °C (97.9 °F) (Temporal)   Resp 18   Ht 5' 8" (1.727 m)   Wt 102.1 kg (225 lb)   LMP  (LMP Unknown)   SpO2 98%   Breastfeeding? No   BMI 34.21 kg/m²       Pain/Annette Score: Pain Assessment Performed: Yes (3/22/2018  8:45 AM)  Presence of Pain: denies (3/22/2018  8:45 AM)  Pain Rating Prior to Med Admin: 0 (3/22/2018  6:59 AM)  Pain Rating Post Med Admin: 0 (3/22/2018  6:59 AM)  Annette Score: 10 (3/22/2018  8:45 AM)      "

## 2018-03-23 ENCOUNTER — OFFICE VISIT (OUTPATIENT)
Dept: OPHTHALMOLOGY | Facility: CLINIC | Age: 78
End: 2018-03-23
Payer: MEDICARE

## 2018-03-23 DIAGNOSIS — Z98.42 STATUS POST CATARACT EXTRACTION AND INSERTION OF INTRAOCULAR LENS, LEFT: Primary | ICD-10-CM

## 2018-03-23 DIAGNOSIS — H25.11 NUCLEAR SCLEROTIC CATARACT OF RIGHT EYE: ICD-10-CM

## 2018-03-23 DIAGNOSIS — Z96.1 STATUS POST CATARACT EXTRACTION AND INSERTION OF INTRAOCULAR LENS, LEFT: Primary | ICD-10-CM

## 2018-03-23 PROCEDURE — 99024 POSTOP FOLLOW-UP VISIT: CPT | Mod: S$GLB,,, | Performed by: OPHTHALMOLOGY

## 2018-03-23 PROCEDURE — 99999 PR PBB SHADOW E&M-EST. PATIENT-LVL II: CPT | Mod: PBBFAC,,, | Performed by: OPHTHALMOLOGY

## 2018-03-24 NOTE — PROGRESS NOTES
HPI     Referred by Dr Clemens     S/p lid sx OS (pt not sure of the date)  S/p phaco OS w/IOL 03/22/18    Gtts: Vigamox TID OS           PF TID OS           Ketorolac TID OS    POD 1 phaco OD. Doing well. No complaints.    Last edited by Bill Esparza MA on 3/23/2018  1:49 PM. (History)            Assessment /Plan     For exam results, see Encounter Report.    Status post cataract extraction and insertion of intraocular lens, left    Nuclear sclerotic cataract of right eye      Slit Lamp Exam  L/L - normal  C/s - quiet  Cornea - clear  A/C - 1+ cell  Lens - PCIOL    POD #1 s/p phaco/IOL  - doing well  - continue the following drops:    vigamox or ocuflox TID x 1 wk then stop  Pred forte or durezol or dexamethasone TID x  4 wks  Ketorolac TID until runs out    Appropriate precautions and post op medications reviewed.  Patient instructed to call or come in if symptoms of redness, decreased vision, or pain are experienced.    -f/up 1-2wks, sooner PRN.       Cat od - sign consent if ready

## 2018-03-26 ENCOUNTER — TELEPHONE (OUTPATIENT)
Dept: BARIATRICS | Facility: CLINIC | Age: 78
End: 2018-03-26

## 2018-03-26 RX ORDER — OXYCODONE AND ACETAMINOPHEN 10; 325 MG/1; MG/1
1 TABLET ORAL
Qty: 150 TABLET | Refills: 0 | Status: CANCELLED | OUTPATIENT
Start: 2018-03-26

## 2018-03-26 NOTE — TELEPHONE ENCOUNTER
----- Message from La Villasenor sent at 3/26/2018 10:02 AM CDT -----  Contact: pt [phone]  Pt would like a call back from the nurse regarding oxyCODONE-acetaminophen (PERCOCET)  mg per tablet. Please advise.

## 2018-03-27 RX ORDER — OXYCODONE AND ACETAMINOPHEN 10; 325 MG/1; MG/1
1 TABLET ORAL
Qty: 150 TABLET | Refills: 0 | Status: SHIPPED | OUTPATIENT
Start: 2018-03-27 | End: 2018-04-19 | Stop reason: SDUPTHER

## 2018-04-02 ENCOUNTER — OFFICE VISIT (OUTPATIENT)
Dept: INTERNAL MEDICINE | Facility: CLINIC | Age: 78
End: 2018-04-02
Payer: MEDICARE

## 2018-04-02 VITALS
BODY MASS INDEX: 34.45 KG/M2 | HEIGHT: 68 IN | OXYGEN SATURATION: 97 % | SYSTOLIC BLOOD PRESSURE: 130 MMHG | HEART RATE: 79 BPM | WEIGHT: 227.31 LBS | DIASTOLIC BLOOD PRESSURE: 80 MMHG

## 2018-04-02 DIAGNOSIS — M79.605 LEFT LEG PAIN: Primary | ICD-10-CM

## 2018-04-02 PROCEDURE — 99213 OFFICE O/P EST LOW 20 MIN: CPT | Mod: S$GLB,,, | Performed by: NURSE PRACTITIONER

## 2018-04-02 PROCEDURE — 99999 PR PBB SHADOW E&M-EST. PATIENT-LVL III: CPT | Mod: PBBFAC,,, | Performed by: NURSE PRACTITIONER

## 2018-04-02 PROCEDURE — 3075F SYST BP GE 130 - 139MM HG: CPT | Mod: CPTII,S$GLB,, | Performed by: NURSE PRACTITIONER

## 2018-04-02 PROCEDURE — 3079F DIAST BP 80-89 MM HG: CPT | Mod: CPTII,S$GLB,, | Performed by: NURSE PRACTITIONER

## 2018-04-02 NOTE — PROGRESS NOTES
Subjective:       Patient ID: Henrietta Villasenor is a 78 y.o. female.    Chief Complaint: Leg Pain    HPI:  77 yo female that presents to clinic today for left leg pain x 3 weeks.    Patient had total knee arthroplasty to left knee about 5 years ago.  Patient also has chronic back pain for which she takes percocet through out the day.  States that the percocet does help with the leg pain.  Patient states that pain is primarily located in the left knee but will radiate down left leg.  States that she will also get a little bit of swelling on the left sometimes.  States that the pain comes and goes and has a crampy like quality to it.  States that she is still able to get up and ambulate on leg.    Denies any SOB, chest pain, n/v or dizziness.    Review of Systems   Constitutional: Negative for activity change, appetite change, fatigue and fever.   Respiratory: Negative for apnea, cough, shortness of breath and wheezing.    Cardiovascular: Positive for leg swelling. Negative for chest pain and palpitations.   Gastrointestinal: Negative for abdominal distention, constipation, diarrhea, nausea and vomiting.   Musculoskeletal: Positive for arthralgias, back pain and joint swelling. Negative for neck pain and neck stiffness.   Neurological: Negative for dizziness, light-headedness, numbness and headaches.   Psychiatric/Behavioral: Negative for behavioral problems.       Objective:      Physical Exam   Constitutional: She is oriented to person, place, and time. She appears well-developed and well-nourished. No distress.   Cardiovascular: Normal rate, regular rhythm, normal heart sounds and intact distal pulses.    No murmur heard.  Pulses:       Popliteal pulses are 2+ on the right side, and 2+ on the left side.        Dorsalis pedis pulses are 2+ on the right side, and 2+ on the left side.        Posterior tibial pulses are 2+ on the right side, and 2+ on the left side.   Pulmonary/Chest: Effort normal and breath sounds  normal. No respiratory distress. She has no wheezes. She has no rales.   Musculoskeletal:        Left knee: She exhibits swelling. She exhibits normal range of motion, no effusion, no LCL laxity, no bony tenderness and no MCL laxity. No tenderness found.   Slight swelling around the left knee when compared to right.  Good ROM and strength.  Pulses are present through out leg and knee.   Neurological: She is alert and oriented to person, place, and time. No sensory deficit.   Skin: Skin is warm and dry. No erythema.   Psychiatric: Her behavior is normal.       Assessment:       1. Left leg pain        Plan:       1. Left leg pain    -Vitals are stable in clinic.  -Will order a venous ultrasound of left leg to rule out DVT.  -Can continue to take pain medication as directed.  -Encouraged to elevate left leg when resting at home to help with pain and swelling.  -Can try applying ice to left knee to help with pain and swelling.

## 2018-04-03 ENCOUNTER — HOSPITAL ENCOUNTER (OUTPATIENT)
Dept: RADIOLOGY | Facility: HOSPITAL | Age: 78
Discharge: HOME | End: 2018-04-03
Attending: NURSE PRACTITIONER
Payer: MEDICARE

## 2018-04-03 DIAGNOSIS — M79.605 LEFT LEG PAIN: ICD-10-CM

## 2018-04-03 DIAGNOSIS — J30.9 ALLERGIC RHINITIS: ICD-10-CM

## 2018-04-03 PROCEDURE — 93971 EXTREMITY STUDY: CPT | Mod: TC

## 2018-04-03 PROCEDURE — 93971 EXTREMITY STUDY: CPT | Mod: 26,,, | Performed by: RADIOLOGY

## 2018-04-03 RX ORDER — LORATADINE 10 MG/1
TABLET ORAL
Qty: 90 TABLET | Refills: 4 | Status: SHIPPED | OUTPATIENT
Start: 2018-04-03 | End: 2019-10-22 | Stop reason: SDUPTHER

## 2018-04-03 NOTE — TELEPHONE ENCOUNTER
Can you give Ms Villasenor a call and let her know the ultrasound we did of her left leg came back completely normal.  There is no blood clot in her leg.  Thanks.     Andrea

## 2018-04-03 NOTE — TELEPHONE ENCOUNTER
Spoke with patient, informed of results. Patient expressed great understanding and had no questions.

## 2018-04-19 ENCOUNTER — LAB VISIT (OUTPATIENT)
Dept: LAB | Facility: HOSPITAL | Age: 78
End: 2018-04-19
Attending: INTERNAL MEDICINE
Payer: MEDICARE

## 2018-04-19 ENCOUNTER — OFFICE VISIT (OUTPATIENT)
Dept: INTERNAL MEDICINE | Facility: CLINIC | Age: 78
End: 2018-04-19
Payer: MEDICARE

## 2018-04-19 DIAGNOSIS — Z79.4 TYPE 2 DIABETES MELLITUS WITH DIABETIC POLYNEUROPATHY, WITH LONG-TERM CURRENT USE OF INSULIN: ICD-10-CM

## 2018-04-19 DIAGNOSIS — E11.9 DIABETES MELLITUS: ICD-10-CM

## 2018-04-19 DIAGNOSIS — E11.42 TYPE 2 DIABETES MELLITUS WITH DIABETIC POLYNEUROPATHY, WITH LONG-TERM CURRENT USE OF INSULIN: ICD-10-CM

## 2018-04-19 DIAGNOSIS — E13.9 DIABETES MELLITUS DUE TO ABNORMAL INSULIN: ICD-10-CM

## 2018-04-19 DIAGNOSIS — I10 ESSENTIAL HYPERTENSION: Primary | Chronic | ICD-10-CM

## 2018-04-19 DIAGNOSIS — E78.5 HYPERLIPIDEMIA, UNSPECIFIED HYPERLIPIDEMIA TYPE: ICD-10-CM

## 2018-04-19 DIAGNOSIS — K21.9 GASTROESOPHAGEAL REFLUX DISEASE WITHOUT ESOPHAGITIS: ICD-10-CM

## 2018-04-19 DIAGNOSIS — I25.119 CORONARY ARTERY DISEASE INVOLVING NATIVE CORONARY ARTERY OF NATIVE HEART WITH ANGINA PECTORIS: ICD-10-CM

## 2018-04-19 LAB
ALBUMIN SERPL BCP-MCNC: 3.3 G/DL
ALP SERPL-CCNC: 80 U/L
ALT SERPL W/O P-5'-P-CCNC: 14 U/L
ANION GAP SERPL CALC-SCNC: 7 MMOL/L
AST SERPL-CCNC: 17 U/L
BASOPHILS # BLD AUTO: 0.05 K/UL
BASOPHILS NFR BLD: 0.5 %
BILIRUB SERPL-MCNC: 0.6 MG/DL
BUN SERPL-MCNC: 13 MG/DL
CALCIUM SERPL-MCNC: 9.5 MG/DL
CHLORIDE SERPL-SCNC: 102 MMOL/L
CHOLEST SERPL-MCNC: 172 MG/DL
CHOLEST/HDLC SERPL: 3 {RATIO}
CO2 SERPL-SCNC: 31 MMOL/L
CREAT SERPL-MCNC: 1.1 MG/DL
DIFFERENTIAL METHOD: ABNORMAL
EOSINOPHIL # BLD AUTO: 0.3 K/UL
EOSINOPHIL NFR BLD: 2.7 %
ERYTHROCYTE [DISTWIDTH] IN BLOOD BY AUTOMATED COUNT: 14.6 %
EST. GFR  (AFRICAN AMERICAN): 56 ML/MIN/1.73 M^2
EST. GFR  (NON AFRICAN AMERICAN): 48 ML/MIN/1.73 M^2
ESTIMATED AVG GLUCOSE: 174 MG/DL
GLUCOSE SERPL-MCNC: 199 MG/DL
HBA1C MFR BLD HPLC: 7.7 %
HCT VFR BLD AUTO: 37.6 %
HDLC SERPL-MCNC: 57 MG/DL
HDLC SERPL: 33.1 %
HGB BLD-MCNC: 12 G/DL
LDLC SERPL CALC-MCNC: 98 MG/DL
LYMPHOCYTES # BLD AUTO: 2.4 K/UL
LYMPHOCYTES NFR BLD: 22.1 %
MCH RBC QN AUTO: 28 PG
MCHC RBC AUTO-ENTMCNC: 31.9 G/DL
MCV RBC AUTO: 88 FL
MONOCYTES # BLD AUTO: 1 K/UL
MONOCYTES NFR BLD: 9.1 %
NEUTROPHILS # BLD AUTO: 7.1 K/UL
NEUTROPHILS NFR BLD: 65.3 %
NONHDLC SERPL-MCNC: 115 MG/DL
PLATELET # BLD AUTO: 233 K/UL
PMV BLD AUTO: 10.4 FL
POTASSIUM SERPL-SCNC: 4 MMOL/L
PROT SERPL-MCNC: 7.3 G/DL
RBC # BLD AUTO: 4.28 M/UL
SODIUM SERPL-SCNC: 140 MMOL/L
TRIGL SERPL-MCNC: 85 MG/DL
TSH SERPL DL<=0.005 MIU/L-ACNC: 1.81 UIU/ML
WBC # BLD AUTO: 10.83 K/UL

## 2018-04-19 PROCEDURE — 99214 OFFICE O/P EST MOD 30 MIN: CPT | Mod: S$GLB,,, | Performed by: INTERNAL MEDICINE

## 2018-04-19 PROCEDURE — 3078F DIAST BP <80 MM HG: CPT | Mod: CPTII,S$GLB,, | Performed by: INTERNAL MEDICINE

## 2018-04-19 PROCEDURE — 83036 HEMOGLOBIN GLYCOSYLATED A1C: CPT

## 2018-04-19 PROCEDURE — 85025 COMPLETE CBC W/AUTO DIFF WBC: CPT

## 2018-04-19 PROCEDURE — 84443 ASSAY THYROID STIM HORMONE: CPT

## 2018-04-19 PROCEDURE — 99999 PR PBB SHADOW E&M-EST. PATIENT-LVL II: CPT | Mod: PBBFAC,,, | Performed by: INTERNAL MEDICINE

## 2018-04-19 PROCEDURE — 99499 UNLISTED E&M SERVICE: CPT | Mod: S$GLB,,, | Performed by: INTERNAL MEDICINE

## 2018-04-19 PROCEDURE — 3074F SYST BP LT 130 MM HG: CPT | Mod: CPTII,S$GLB,, | Performed by: INTERNAL MEDICINE

## 2018-04-19 PROCEDURE — 80053 COMPREHEN METABOLIC PANEL: CPT

## 2018-04-19 PROCEDURE — 36415 COLL VENOUS BLD VENIPUNCTURE: CPT

## 2018-04-19 PROCEDURE — 80061 LIPID PANEL: CPT

## 2018-04-19 RX ORDER — OXYCODONE AND ACETAMINOPHEN 10; 325 MG/1; MG/1
1 TABLET ORAL
Qty: 150 TABLET | Refills: 0 | Status: SHIPPED | OUTPATIENT
Start: 2018-06-19 | End: 2018-06-22 | Stop reason: SDUPTHER

## 2018-04-19 RX ORDER — OXYCODONE AND ACETAMINOPHEN 10; 325 MG/1; MG/1
1 TABLET ORAL
Qty: 150 TABLET | Refills: 0 | Status: SHIPPED | OUTPATIENT
Start: 2018-05-19 | End: 2018-04-23 | Stop reason: SDUPTHER

## 2018-04-19 RX ORDER — OXYCODONE AND ACETAMINOPHEN 10; 325 MG/1; MG/1
1 TABLET ORAL
Qty: 150 TABLET | Refills: 0 | Status: SHIPPED | OUTPATIENT
Start: 2018-04-19 | End: 2018-06-22 | Stop reason: SDUPTHER

## 2018-04-19 NOTE — PROGRESS NOTES
CHIEF COMPLAINT: Follow up of dizziness, back pain, hypertension, diabetes    HISTORY OF PRESENT ILLNESS: This is a 78-year-old woman who presents for follow up of above.  She has had left knee pain. The pain comes and goes. Pain can be worse at night.    She is dizzy several times a week, usually when she first gets out of bed in the morning.  WHen she puts her head down and comes up she will get dizzy. . Symptoms come and go. She continues to take loratadine 10 mg daily and flonase daily. Sinuses have been ok. She has clear nasal drainage.      Heartburn is controlled. She is taking pantoprazole 40 mg twice daily and ranitidine 300 mg at night.  She is taking dicyclomine 10 mg three times daily as needed which helps somewhat. Symptoms occur 1-2 times a month.  No nausea, vomiting, constipation, diarrhea. HEr bowels move regularly with metamucil.      She saw Dr Le 1/31/17 and [facility] SHe has daily back pain that is worse with walking.  She has been off gabapentin. She was taking gabapentin 100 mg 1 capsule three times daily and 3 at bedtime. She did not like how the gabapentin made her feel.  She takes oxycodone apap 10/325 one tablet 3-5 times daily depending on her pain. SHe takes Cymbalta 60 mg daily which helps her pain and her mood.        She continues to take aspirin 81 mg daily, coreg 25 mg twice daily and lasix 40 mg twice daily for her diastolic dysfunction and hypertension. She denies lightheadedness. No chest pain or shortness of breath. She is now taking atorvastatin 40 mg once daily for her cholesterol.      She is taking lantus 60 units at night and Humalog 23 units with meals. Blood sugars have bene up and down.   No hypoglycemia.       PAST MEDICAL HISTORY:   1. Diabetes mellitus   2. Hypertension   3. Hyperlipidemia   4. Left heart catheterization January 2007 which revealed luminal irregularities in the LAD, left circumflex and right coronary artery.  "She had diastolic dysfunction and patent renal arteries.   5. Sleep apnea   6. Obesity.   7. Nephrolithiasis status post lithotripsy.   8. Reflux - had nonerosive gastropathy on EGD September 2007. Gastritis on EGD 9/2010  9. Hidradenitis suppurativa.   10. History of diverticulitis with a hospitalized October 2007.   11. Migraines   12. Obesity.   13. 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    14. Total hysterectomy in 1969.   15. Cholecystectomy was done at that time as well.   16. 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   17. Colon polyps on colonscopy 11/2010 - due 2013   18. Hospitalization 12/10 for e coli sepsis due to left ureteral stone with left nephrostomy tube in [place], [place]   19. Left knee replacement 9/2012      MEDICATIONS and ALLERGIES: Updated on EPIC.     PHYSICAL EXAMINATION:    /70   Pulse 70   Ht 5' 8" (1.727 m)   Wt 104 kg (229 lb 4.5 oz)   LMP  (LMP Unknown)   BMI 34.86 kg/m²      GENERAL: She is alert, oriented, no apparent distress. Affect within   normal limits.  Conjunctiva anicteric. . Oropharynx clear.   NECK: Supple.   Respiratory: Effort normal. Lungs clear  HEART: Regular rate and rhythm without murmurs, gallops or rubs.   No lower extremity edema.       ASSESSMENT AND PLAN:   1. HTN -  monitor BP at home  3. Gerd - stable pantoprazole to 40 mg daily and  ranitidine 300 mg nightly  4. Diabetes with neuropathy- labs today   5. Hyperlipidemia - on lipitor  6. Allergic rhinitis - stable  7. Diastolic congestive heart failure - stable.    8. CRI -stable  9. Obesity - discussed diet, exercise and weight loss  10. CAD -risk factor modification  11. Aortic atherosclerosis and possible mesenteric artery stenosis- risk factor modification  12. Tortuous aorta - HTN controlled  13. Colon polyps - Colonoscopy in 8/2013 - 2 polyps. Flex sig 11/2013 - mild granular mucosa.  14. hyperparathryodisism -saw " nephrology    Recommended colonoscopy in 8/2018. Attempted colonoscopy 11/2016 du to diarrhea which has resolved. She had poor prep with hyperplastic rectal biopsy.   MMG 1/18  Prevnar 10/15 and flu shot 11/16  I will see her back in 3 months, sooner if problems

## 2018-04-20 ENCOUNTER — PES CALL (OUTPATIENT)
Dept: ADMINISTRATIVE | Facility: CLINIC | Age: 78
End: 2018-04-20

## 2018-04-22 VITALS
HEART RATE: 70 BPM | HEIGHT: 68 IN | SYSTOLIC BLOOD PRESSURE: 110 MMHG | DIASTOLIC BLOOD PRESSURE: 70 MMHG | BODY MASS INDEX: 34.75 KG/M2 | WEIGHT: 229.25 LBS

## 2018-04-23 ENCOUNTER — OFFICE VISIT (OUTPATIENT)
Dept: OPHTHALMOLOGY | Facility: CLINIC | Age: 78
End: 2018-04-23
Payer: MEDICARE

## 2018-04-23 DIAGNOSIS — H35.373 EPIRETINAL MEMBRANE, BOTH EYES: ICD-10-CM

## 2018-04-23 DIAGNOSIS — Z98.42 STATUS POST CATARACT EXTRACTION AND INSERTION OF INTRAOCULAR LENS, LEFT: ICD-10-CM

## 2018-04-23 DIAGNOSIS — H16.223 KERATOCONJUNCTIVITIS SICCA NOT SPECIFIED AS SJOGREN'S, BILATERAL: ICD-10-CM

## 2018-04-23 DIAGNOSIS — H25.11 NUCLEAR SCLEROTIC CATARACT OF RIGHT EYE: Primary | ICD-10-CM

## 2018-04-23 DIAGNOSIS — Z96.1 STATUS POST CATARACT EXTRACTION AND INSERTION OF INTRAOCULAR LENS, LEFT: ICD-10-CM

## 2018-04-23 PROCEDURE — 99999 PR PBB SHADOW E&M-EST. PATIENT-LVL III: CPT | Mod: PBBFAC,,, | Performed by: OPHTHALMOLOGY

## 2018-04-23 PROCEDURE — 92136 OPHTHALMIC BIOMETRY: CPT | Mod: 26,RT,S$GLB, | Performed by: OPHTHALMOLOGY

## 2018-04-23 PROCEDURE — 99024 POSTOP FOLLOW-UP VISIT: CPT | Mod: S$GLB,,, | Performed by: OPHTHALMOLOGY

## 2018-04-23 NOTE — PROGRESS NOTES
HPI     Referred by Dr Clemens     S/p lid sx OS (pt not sure of the date)  S/p phaco OS w/IOL 03/22/18    Gtts:          PF TID OS             Pt returns for 3-4 week post op follow up. Pt reports intermittent fb an   pretty pain OS. Denies any flashes or flashes.    Last edited by Keysha Valle MD on 4/23/2018  2:27 PM. (History)            Assessment /Plan     For exam results, see Encounter Report.    Nuclear sclerotic cataract of right eye  -     IOL Master - OD - Right Eye    Status post cataract extraction and insertion of intraocular lens, left    Epiretinal membrane, both eyes    Keratoconjunctivitis sicca not specified as Sjogren's, bilateral      Visually significant nuclear sclerotic cataract   - Interfering with activities of daily living.  Pt desires cataract surgery for [facility].   - R/B/A discussed and pt agrees to proceed with surgery.   - IOL options discussed according to patient's goals and concomitant ocular pathology; and pt content with monofocal lens.    - Target: plano.    pcboo 22.5 OD  *poor dilation - used ring for OS    K sicca  - ATs    GS  - GS w/up with dr. Clemens after cat sx    erm OD>OS  - OCT mac today  - some traction OD, pt donies MMP, observe for now.      S/p phaco OS w/IOL 03/22/18  - doing well, can stop all gtts

## 2018-05-01 ENCOUNTER — TELEPHONE (OUTPATIENT)
Dept: INTERNAL MEDICINE | Facility: CLINIC | Age: 78
End: 2018-05-01

## 2018-05-01 NOTE — TELEPHONE ENCOUNTER
----- Message from Jeannine Arriaga MD sent at 4/30/2018 11:09 PM CDT -----  Please notify pt  Your blood count, blood sugar, kidney function, liver function, cholesterol, thyroid function are fine  Your hemoglobin A1C (three month blood sugar average) is 7.7  (Average blood sugar of 174.)  Goal hemoglobin A1C is 7.0 or less.  Blood sugars are doing better. Last hemoglobin A1C was 7.8  Continue current medicaions  Jeannine Arriaga M.D.

## 2018-05-07 ENCOUNTER — TELEPHONE (OUTPATIENT)
Dept: INTERNAL MEDICINE | Facility: CLINIC | Age: 78
End: 2018-05-07

## 2018-05-07 RX ORDER — CIPROFLOXACIN 500 MG/1
500 TABLET ORAL 2 TIMES DAILY
Qty: 20 TABLET | Refills: 0 | Status: SHIPPED | OUTPATIENT
Start: 2018-05-07 | End: 2018-05-17

## 2018-05-07 NOTE — TELEPHONE ENCOUNTER
----- Message from Jose Scott sent at 5/7/2018  9:51 AM CDT -----  Contact: Patient [phone]  Patient stating would like for you to send Rx to pharmacy stating she is having Cramps started on Saturday and beginning to get worse.    Pharmacy:[facility] [address] [place] [address] [address] AT [facility] [phone] (Phone)  [phone] (Fax    Please call and advise  Thank you

## 2018-05-07 NOTE — TELEPHONE ENCOUNTER
Pt having cramping in her stomach. 6/10 on pain scale in abd area, also has itching in abd area. , pt urine is red also. Pt would like a Rx called in for this. X Friday. Offered pt an apt, however she would like a Rx called in for this. Pt has no other symptoms.

## 2018-05-17 ENCOUNTER — OFFICE VISIT (OUTPATIENT)
Dept: INTERNAL MEDICINE | Facility: CLINIC | Age: 78
End: 2018-05-17
Payer: MEDICARE

## 2018-05-17 VITALS
HEART RATE: 74 BPM | BODY MASS INDEX: 34.37 KG/M2 | OXYGEN SATURATION: 97 % | SYSTOLIC BLOOD PRESSURE: 138 MMHG | HEIGHT: 68 IN | WEIGHT: 226.81 LBS | DIASTOLIC BLOOD PRESSURE: 80 MMHG

## 2018-05-17 DIAGNOSIS — E11.42 TYPE 2 DIABETES MELLITUS WITH DIABETIC POLYNEUROPATHY, WITH LONG-TERM CURRENT USE OF INSULIN: ICD-10-CM

## 2018-05-17 DIAGNOSIS — H81.10 BENIGN PAROXYSMAL POSITIONAL VERTIGO, UNSPECIFIED LATERALITY: Primary | ICD-10-CM

## 2018-05-17 DIAGNOSIS — E78.5 HYPERLIPIDEMIA, UNSPECIFIED HYPERLIPIDEMIA TYPE: ICD-10-CM

## 2018-05-17 DIAGNOSIS — I10 ESSENTIAL HYPERTENSION: Chronic | ICD-10-CM

## 2018-05-17 DIAGNOSIS — K21.9 GASTROESOPHAGEAL REFLUX DISEASE WITHOUT ESOPHAGITIS: ICD-10-CM

## 2018-05-17 DIAGNOSIS — H81.13 BENIGN PAROXYSMAL POSITIONAL VERTIGO DUE TO BILATERAL VESTIBULAR DISORDER: ICD-10-CM

## 2018-05-17 DIAGNOSIS — Z79.4 TYPE 2 DIABETES MELLITUS WITH DIABETIC POLYNEUROPATHY, WITH LONG-TERM CURRENT USE OF INSULIN: ICD-10-CM

## 2018-05-17 PROCEDURE — 3075F SYST BP GE 130 - 139MM HG: CPT | Mod: CPTII,S$GLB,, | Performed by: INTERNAL MEDICINE

## 2018-05-17 PROCEDURE — 99999 PR PBB SHADOW E&M-EST. PATIENT-LVL III: CPT | Mod: PBBFAC,,, | Performed by: INTERNAL MEDICINE

## 2018-05-17 PROCEDURE — 3079F DIAST BP 80-89 MM HG: CPT | Mod: CPTII,S$GLB,, | Performed by: INTERNAL MEDICINE

## 2018-05-17 PROCEDURE — 99214 OFFICE O/P EST MOD 30 MIN: CPT | Mod: S$GLB,,, | Performed by: INTERNAL MEDICINE

## 2018-05-17 PROCEDURE — 99499 UNLISTED E&M SERVICE: CPT | Mod: S$GLB,,, | Performed by: INTERNAL MEDICINE

## 2018-05-17 RX ORDER — DIAZEPAM 2 MG/1
2 TABLET ORAL EVERY 12 HOURS PRN
Qty: 60 TABLET | Refills: 0 | Status: SHIPPED | OUTPATIENT
Start: 2018-05-17 | End: 2018-06-22

## 2018-05-17 NOTE — PROGRESS NOTES
CHIEF COMPLAINT: Follow up of dizziness, back pain, hypertension, diabetes     HISTORY OF PRESENT ILLNESS: This is a 78-year-old woman who presents for follow up of above.      She continues to have left lower leg pain. Pain is from the knee to the foot. Her knee can give out at times. No falls. Leg aches.  Back not bothering her too much.   She feels that she has pins and needles in her left foot.     She continues to have dizziness when changes position of her head.  Symptoms last couple seconds. Dizziness makes her nauseated.  She has pain behind the left ear. She feels that she has something in her left ear.  She continues to take loratadine 10 mg daily and flonase daily. She has clear nasal drainage. She has a scratchy throat. No fever or chills.     She has been on Cipro 500 mg twice daily for a UTI since 5/7/18. Dysuria resolved. Hematuria resolved.      Heartburn is controlled. She is taking pantoprazole 40 mg twice daily and ranitidine 300 mg at night.  She is taking dicyclomine 10 mg three times daily as needed which helps somewhat. Symptoms occur 1-2 times a month.  No nausea, vomiting, constipation, diarrhea. HEr bowels move regularly with metamucil.      She saw Dr Le 1/31/17 and [facility] SHe has daily back pain that is worse with walking.  She has been off gabapentin. She was taking gabapentin 100 mg 1 capsule three times daily and 3 at bedtime. She did not like how the gabapentin made her feel.  She takes oxycodone apap 10/325 one tablet 3-5 times daily depending on her pain. SHe takes Cymbalta 60 mg daily which helps her pain and her mood.        She continues to take aspirin 81 mg daily, coreg 25 mg twice daily and lasix 40 mg twice daily for her diastolic dysfunction and hypertension. She denies lightheadedness. No chest pain or shortness of breath. She is now taking atorvastatin 40 mg once daily for her cholesterol.      She is taking lantus 60 units at  "night and Humalog 23 units with meals. Blood sugars have bene up and down.   No hypoglycemia.       PAST MEDICAL HISTORY:   1. Diabetes mellitus   2. Hypertension   3. Hyperlipidemia   4. Left heart catheterization January 2007 which revealed luminal irregularities in the LAD, left circumflex and right coronary artery. She had diastolic dysfunction and patent renal arteries.   5. Sleep apnea   6. Obesity.   7. Nephrolithiasis status post lithotripsy.   8. Reflux - had nonerosive gastropathy on EGD September 2007. Gastritis on EGD 9/2010  9. Hidradenitis suppurativa.   10. History of diverticulitis with a hospitalized October 2007.   11. Migraines   12. Obesity.   13. 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    14. Total hysterectomy in 1969.   15. Cholecystectomy was done at that time as well.   16. 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   17. Colon polyps on colonscopy 11/2010 - due 2013   18. Hospitalization 12/10 for e coli sepsis due to left ureteral stone with left nephrostomy tube in [place], [place]   19. Left knee replacement 9/2012      MEDICATIONS and ALLERGIES: Updated on EPIC.     PHYSICAL EXAMINATION:    /80 (BP Location: Right arm, Patient Position: Sitting, BP Method: Medium (Manual))   Pulse 74   Ht 5' 8" (1.727 m)   Wt 102.9 kg (226 lb 12.8 oz)   LMP  (LMP Unknown)   SpO2 97%   BMI 34.48 kg/m²      GENERAL: She is alert, oriented, no apparent distress. Affect within   normal limits.  Conjunctiva anicteric. . Oropharynx clear.   NECK: Supple.   Respiratory: Effort normal. Lungs clear  HEART: Regular rate and rhythm without murmurs, gallops or rubs.   No lower extremity edema.      Labs 4/19/18 reviewed -      ASSESSMENT AND PLAN:   1. Dizziness - likely benign positional vertigo - valium 2 mg twice daily. Had MRI brain 2/17/16. May need repeat imaging if does not improve  2. Left leg pain -sounds like " neuropathic pain from the back. See if valium helps  3. HTN -  monitor BP at home  3. Gerd - stable pantoprazole to 40 mg daily and  ranitidine 300 mg nightly  4. Diabetes with neuropathy- labs today   5. Hyperlipidemia - on lipitor  6. Allergic rhinitis - stable  7. Diastolic congestive heart failure - stable.    8. CRI -stable  9. Obesity - discussed diet, exercise and weight loss  10. CAD -risk factor modification  11. Aortic atherosclerosis and possible mesenteric artery stenosis- risk factor modification  12. Tortuous aorta - HTN controlled  13. Colon polyps - Colonoscopy in 8/2013 - 2 polyps. Flex sig 11/2013 - mild granular mucosa.  14. hyperparathryodisism -saw nephrology    Recommended colonoscopy in 8/2018. Attempted colonoscopy 11/2016 du to diarrhea which has resolved. She had poor prep with hyperplastic rectal biopsy.   MMG 1/18  Prevnar 10/15 and flu shot 11/16  I will see her back in 3 months, sooner if problems

## 2018-05-18 PROBLEM — H81.13 BENIGN PAROXYSMAL POSITIONAL VERTIGO DUE TO BILATERAL VESTIBULAR DISORDER: Status: ACTIVE | Noted: 2018-05-18

## 2018-05-21 ENCOUNTER — OFFICE VISIT (OUTPATIENT)
Dept: PODIATRY | Facility: CLINIC | Age: 78
End: 2018-05-21
Payer: MEDICARE

## 2018-05-21 VITALS
WEIGHT: 227 LBS | RESPIRATION RATE: 18 BRPM | BODY MASS INDEX: 34.4 KG/M2 | SYSTOLIC BLOOD PRESSURE: 157 MMHG | HEIGHT: 68 IN | DIASTOLIC BLOOD PRESSURE: 84 MMHG | HEART RATE: 84 BPM

## 2018-05-21 DIAGNOSIS — E11.49 TYPE II DIABETES MELLITUS WITH NEUROLOGICAL MANIFESTATIONS: Primary | ICD-10-CM

## 2018-05-21 DIAGNOSIS — L84 CORN OR CALLUS: ICD-10-CM

## 2018-05-21 DIAGNOSIS — B35.1 DERMATOPHYTOSIS OF NAIL: ICD-10-CM

## 2018-05-21 PROCEDURE — 99999 PR PBB SHADOW E&M-EST. PATIENT-LVL III: CPT | Mod: PBBFAC,,, | Performed by: PODIATRIST

## 2018-05-21 PROCEDURE — 11721 DEBRIDE NAIL 6 OR MORE: CPT | Mod: 59,Q9,S$GLB, | Performed by: PODIATRIST

## 2018-05-21 PROCEDURE — 11057 PARNG/CUTG B9 HYPRKR LES >4: CPT | Mod: S$GLB,,, | Performed by: PODIATRIST

## 2018-05-21 PROCEDURE — 99499 UNLISTED E&M SERVICE: CPT | Mod: S$GLB,,, | Performed by: PODIATRIST

## 2018-05-21 NOTE — PROGRESS NOTES
Subjective:      Patient ID: Henrietta Villasenor is a 78 y.o. female.    Chief Complaint: PCP (Jeannine Arriaga MD 5/17/18); Diabetic Foot Exam; Nail Problem; and Nail Care    Henrietta is a 78 y.o. female who presents to the clinic for evaluation and treatment of high risk feet. Henrietta has a past medical history of Abnormality of lung (11/08/2011); Anxiety; Arthritis; CAD (coronary artery disease); Calculus of ureter (2/22/2011); Cataract; CHF (congestive heart failure); Chronic back pain (7/29/2012); Colon polyp (9/2010; 11/2010); Colon polyps (7/29/2012); Coronary artery disease involving native coronary artery of native heart with angina pectoris; Depression; Diabetes mellitus; Diabetes mellitus type II; Diverticulitis large intestine (7/27/2015); Diverticulosis (09/25/2010; 11/02/2010; 11/08/2011; 7/29/2012); Duodenal disorder (08/25/2011); Duodenal ulcer, unspecified as acute or chronic, without hemorrhage, perforation, or obstruction (8/24/2011); E. 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); 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); Obesity; MARLON (obstructive sleep apnea); Pain; Peripheral neuropathy; Pneumonia; Polyneuropathy; Postherpetic neuralgia; Recurrent nephrolithiasis; S/P knee replacement (10/2/2012); S/P TKR (total knee replacement) (12/26/2012); Sensorineural hearing loss of both ears; Thyroid disease (11/08/2011); Trouble in sleeping; Type II or  unspecified type diabetes mellitus with neurological manifestations, not stated as uncontrolled(250.60); Type II or unspecified type diabetes mellitus with peripheral circulatory disorders, not stated as uncontrolled(250.70); and Type II or unspecified type diabetes mellitus with renal manifestations, not stated as uncontrolled(250.40). The patient's chief complaint is long, thick toenails. This patient has documented high risk feet requiring routine maintenance secondary to diabetes mellitis and those secondary complications of diabetes, as mentioned..       PCP: Jeannine Arriaga MD    Date Last Seen by PCP:   Chief Complaint   Patient presents with    PCP     Jeannine Arriaga MD 5/17/18    Diabetic Foot Exam    Nail Problem    Nail Care         Current shoe gear: casual shoes    Hemoglobin A1C   Date Value Ref Range Status   04/19/2018 7.7 (H) 4.0 - 5.6 % Final     Comment:     According to ADA guidelines, hemoglobin A1c <7.0% represents  optimal control in non-pregnant diabetic patients. Different  metrics may apply to specific patient populations.   Standards of Medical Care in Diabetes-2016.  For the purpose of screening for the presence of diabetes:  <5.7%     Consistent with the absence of diabetes  5.7-6.4%  Consistent with increasing risk for diabetes   (prediabetes)  >or=6.5%  Consistent with diabetes  Currently, no consensus exists for use of hemoglobin A1c  for diagnosis of diabetes for children.  This Hemoglobin A1c assay has significant interference with fetal   hemoglobin   (HbF). The results are invalid for patients with abnormal amounts of   HbF,   including those with known Hereditary Persistence   of Fetal Hemoglobin. Heterozygous hemoglobin variants (HbAS, HbAC,   HbAD, HbAE, HbA2) do not significantly interfere with this assay;   however, presence of multiple variants in a sample may impact the %   interference.     12/08/2017 7.8 (H) 4.0 - 5.6 % Final     Comment:     According to ADA  guidelines, hemoglobin A1c <7.0% represents  optimal control in non-pregnant diabetic patients. Different  metrics may apply to specific patient populations.   Standards of Medical Care in Diabetes-2016.  For the purpose of screening for the presence of diabetes:  <5.7%     Consistent with the absence of diabetes  5.7-6.4%  Consistent with increasing risk for diabetes   (prediabetes)  >or=6.5%  Consistent with diabetes  Currently, no consensus exists for use of hemoglobin A1c  for diagnosis of diabetes for children.  This Hemoglobin A1c assay has significant interference with fetal   hemoglobin   (HbF). The results are invalid for patients with abnormal amounts of   HbF,   including those with known Hereditary Persistence   of Fetal Hemoglobin. Heterozygous hemoglobin variants (HbAS, HbAC,   HbAD, HbAE, HbA2) do not significantly interfere with this assay;   however, presence of multiple variants in a sample may impact the %   interference.     08/18/2017 7.3 (H) 4.0 - 5.6 % Final     Comment:     According to ADA guidelines, hemoglobin A1c <7.0% represents  optimal control in non-pregnant diabetic patients. Different  metrics may apply to specific patient populations.   Standards of Medical Care in Diabetes-2016.  For the purpose of screening for the presence of diabetes:  <5.7%     Consistent with the absence of diabetes  5.7-6.4%  Consistent with increasing risk for diabetes   (prediabetes)  >or=6.5%  Consistent with diabetes  Currently, no consensus exists for use of hemoglobin A1c  for diagnosis of diabetes for children.  This Hemoglobin A1c assay has significant interference with fetal   hemoglobin   (HbF). The results are invalid for patients with abnormal amounts of   HbF,   including those with known Hereditary Persistence   of Fetal Hemoglobin. Heterozygous hemoglobin variants (HbAS, HbAC,   HbAD, HbAE, HbA2) do not significantly interfere with this assay;   however, presence of multiple variants in a  "sample may impact the %   interference.     08/18/2017 7.3 (H) 4.0 - 5.6 % Final     Comment:     According to ADA guidelines, hemoglobin A1c <7.0% represents  optimal control in non-pregnant diabetic patients. Different  metrics may apply to specific patient populations.   Standards of Medical Care in Diabetes-2016.  For the purpose of screening for the presence of diabetes:  <5.7%     Consistent with the absence of diabetes  5.7-6.4%  Consistent with increasing risk for diabetes   (prediabetes)  >or=6.5%  Consistent with diabetes  Currently, no consensus exists for use of hemoglobin A1c  for diagnosis of diabetes for children.  This Hemoglobin A1c assay has significant interference with fetal   hemoglobin   (HbF). The results are invalid for patients with abnormal amounts of   HbF,   including those with known Hereditary Persistence   of Fetal Hemoglobin. Heterozygous hemoglobin variants (HbAS, HbAC,   HbAD, HbAE, HbA2) do not significantly interfere with this assay;   however, presence of multiple variants in a sample may impact the %   interference.         Review of Systems   Constitution: Negative for chills, decreased appetite and fever.   Cardiovascular: Negative for leg swelling.   Skin: Positive for dry skin and nail changes. Negative for color change, flushing and itching.   Musculoskeletal: Positive for arthritis. Negative for back pain, falls, joint pain, joint swelling, muscle cramps and myalgias.   Gastrointestinal: Negative for nausea and vomiting.   Neurological: Positive for numbness and paresthesias. Negative for loss of balance.           Objective:       Vitals:    05/21/18 1429   BP: (!) 157/84   Pulse: 84   Resp: 18   Weight: 103 kg (227 lb)   Height: 5' 8" (1.727 m)   PainSc: 0-No pain        Physical Exam   Constitutional: She is oriented to person, place, and time. She appears well-developed and well-nourished.   Cardiovascular:   Dorsalis pedis and posterior tibial pulses are diminished " Bilaterally. Toes are cool to touch. Feet are warm proximally.There is decreased digital hair. Skin is atrophic, slightly hyperpigmented, and mildly edematous       Musculoskeletal: Normal range of motion. She exhibits no edema or tenderness.   Adequate joint range of motion without pain, limitation, nor crepitation Bilateral feet and ankle joints. Muscle strength is 5/5 in all groups bilaterally.      semi reducible IPJ digital contracture 2nd toe b/l    Neurological: She is alert and oriented to person, place, and time.   [place] 5.07 monofilamant testing is diminished Mynor feet. Sharp/dull sensation diminished Bilaterally. Light touch absent Bilaterally.       Skin: Skin is warm, dry and intact. No abrasion, no bruising, no burn, no ecchymosis and no lesion noted. No erythema.   Nails x 10  are elongated by  2-6mm's, thickened by 2-5 mm's, dystrophic, and are darkened in  coloration . Xerosis Bilaterally. No open lesions noted.    Hyperkeratotic tissue noted to distal toes 1-4 b/l        Psychiatric: She has a normal mood and affect. Her behavior is normal.   Nursing note and vitals reviewed.            Assessment:       Encounter Diagnoses   Name Primary?    Type II diabetes mellitus with neurological manifestations Yes    Dermatophytosis of nail     Corn or callus          Plan:       Henrietta was seen today for pcp, diabetic foot exam, nail problem and nail care.    Diagnoses and all orders for this visit:    Type II diabetes mellitus with neurological manifestations    Dermatophytosis of nail    Corn or callus      I counseled the patient on her conditions, their implications and medical management.    Shoe inspection. Diabetic Foot Education. Patient reminded of the importance of good nutrition and blood sugar control to help prevent podiatric complications of diabetes. Patient instructed on proper foot hygeine. We discussed wearing proper shoe gear, daily foot inspections, never walking without  protective shoe gear, never putting sharp instruments to feet    - With patient's permission, nails were aggressively reduced and debrided x 10 to their soft tissue attachment mechanically and with electric , removing all offending nail and debris. Patient relates relief following the procedure. She will continue to monitor the areas daily, inspect her feet, wear protective shoe gear when ambulatory, moisturizer to maintain skin integrity and follow in this office in approximately 2-3 months, sooner p.r.n.    - After cleansing the  area w/ alcohol prep pad the above mentioned hyperkeratosis was trimmed utilizing No 15 scapel, to a smooth base with out incident. Patient tolerated this  well and reported comfort to the area of distal toes 1-4 b/l   .

## 2018-05-23 ENCOUNTER — TELEPHONE (OUTPATIENT)
Dept: OPHTHALMOLOGY | Facility: CLINIC | Age: 78
End: 2018-05-23

## 2018-05-23 DIAGNOSIS — H25.11 NUCLEAR SCLEROTIC CATARACT OF RIGHT EYE: Primary | ICD-10-CM

## 2018-06-04 NOTE — PROGRESS NOTES
Subjective:      Patient ID: Henrietta Villasenor is a 78 y.o. female.    Chief Complaint: Low-back Pain    Ms Villasenor is a 79 yo female here for follow up of her CTS and her her low back pain.  She was last seen by me on 2/5/2018 and she was having more hand pain and we repeated CT injections.  She was going to continue to exercise at [employer].   on 9/29/2016 and we did bilateral CT injections which also helped.   Today, she is doing ok.  She has been having some more hand pain on the left greater than right.  She has been dealing with identity theft.  She has not been going to the gym.  She has been having back pain and leg pain on the right.  She feels like the pain is off and on, but she has not been exercising.  She has gained weight.  She did not take her HTN meds this morning and her blood pressure is high.  She is not symptomatic.  The pain is in the back and the hand, but doing her normal activity.  The back pain is the worst with walking, and getting up in the morning.  Pain is 6/10 now, best 3/10.  She will not get up and do her exercises on her own if she does not have some place to go.      Past Medical History:  11/08/2011: Abnormality of lung      Comment: Stable bandlike opacities at the lung bases,                most likely representing    No date: Anxiety  No date: Arthritis  No date: CAD (coronary artery disease)  2/22/2011: Calculus of ureter  No date: Cataract  No date: CHF (congestive heart failure)  7/29/2012: Chronic back pain  9/2010; 11/2010: Colon polyp  7/29/2012: Colon polyps  No date: Coronary artery disease involving native coron*  No date: Depression  No date: Diabetes mellitus  No date: Diabetes mellitus type II  7/27/2015: Diverticulitis large intestine  09/25/2010; 11/02/2010; 11/08/2011; 7/29/2012: Diverticulosis  08/25/2011: Duodenal disorder      Comment: Duodenal erosion noted on EGD.  8/24/2011: Duodenal ulcer, unspecified as acute or chroni*  12/2010: E. coli sepsis      Comment: Due  to left ureteral stone with left                nephrostomy tube - hospitalized in [place]  01/24/2012: Esophageal dysmotility      Comment: Noted on upper GI-barium swallow.  1969: Facial weakness      Comment: Left facial weakness s/p left mastoidectomy in               ~ 1969.  11/08/2011: Fatty liver      Comment: Reported on CT-abdomen and in 06/2012 Gastro                clinic visit note.  09/29/2010: Gastric polyp  7/29/2012: GERD (gastroesophageal reflux disease)  11/08/2011: Hepatomegaly      Comment: Reported on CT-abdomen  2/28/2011: Herpes zoster with other nervous system compli*  06/26/2006; 09/29/2010; 08/25/2011: Hiatal hernia      Comment: Noted on barium swallow 2006; noted on  EGD                2011.  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  No date: Trouble in sleeping  No date: Type II or unspecified type diabetes mellitus *  No date: Type II or unspecified type diabetes mellitus *  No date: Type II or unspecified type diabetes mellitus *    Past Surgical History:  No date: BELPHAROPTOSIS REPAIR      Comment: s/p LAMBERTO levator repair - Dr. Dejesus  No date: CARDIAC CATHETERIZATION  3/13/2012: CARPAL TUNNEL RELEASE  No date: CHOLECYSTECTOMY  11/16/2016: COLONOSCOPY N/A       Comment: Procedure: COLONOSCOPY;  Surgeon: Chris Storey MD;  Location: [facility] ENDO (4TH FLR);                 Service: Endoscopy;  Laterality: N/A;  6/14/2017: COLONOSCOPY N/A      Comment: Procedure: COLONOSCOPY;  Surgeon: Chris Storey MD;  Location: [facility] ENDO (4TH FLR);                 Service: Endoscopy;  Laterality: N/A;                 colonoscopy in 3 months with better bowel prep.               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  No date: NEPHROSTOMY  No date: OOPHORECTOMY  9/13/2012: TOTAL KNEE ARTHROPLASTY      Comment: Left    Review of patient's family history indicates:    Sleep apnea                    Sister                    Cancer                         Sister                      Comment: breast    Diabetes                       Sister                    Breast cancer                  Sister                    Cancer                         Mother                      Comment: brain tumor    Hypertension                   Mother                    Heart disease                  Father                    Heart attack                   Father                    Dementia                       Brother                   Diabetes                       Brother                   Lupus                          Brother                   No Known Problems              Daughter                  No Known Problems              Son                       Diabetes                       Sister                    Lupus                          Sister                    Diabetes                       Sister                    Cancer                         Sister                    Kidney disease                 Sister                    Heart disease                  Sister                    Liver cancer                    Brother                   Cirrhosis                      Brother                   No Known Problems              Daughter                  No Known Problems              Son                       No Known Problems              Son                       Diabetes                       Other                     Ovarian cancer                 Other                       Comment: Niece     Breast cancer                  Maternal Aunt             Glaucoma                       Neg Hx                    Blindness                      Neg Hx                    Celiac disease                 Neg Hx                    Colon cancer                   Neg Hx                    Colon polyps                   Neg Hx                    Esophageal cancer              Neg Hx                    Inflammatory bowel disease     Neg Hx                    Liver disease                  Neg Hx                    Rectal cancer                  Neg Hx                    Stomach cancer                 Neg Hx                    Ulcerative colitis             Neg Hx                    Melanoma                       Neg Hx                    Multiple sclerosis             Neg Hx                    Psoriasis                      Neg Hx                      Social History    Marital status: Single              Spouse name:                       Years of education:                 Number of children: 5             Social History Main Topics    Smoking status: Former Smoker                                                                Packs/day: 1.00      Years: 15.00          Types: Cigarettes       Quit date: 7/24/2000    Smokeless tobacco: Never Used                        Alcohol use: No              Drug use: No              Sexual activity: No                   Social History Narrative    Single with 5 children. Lives alone. Retired 3TEN8.        Current Outpatient Prescriptions:  ACCU-CHEK FASTCLIX Misc, , Disp: , Rfl:  "  ACCU-CHEK SMARTVIEW TEST STRIP Strp, TEST THREE TIMES DAILY, Disp: 300 strip, Rfl: 3  albuterol (PROVENTIL) 2.5 mg /3 mL (0.083 %) nebulizer solution, Take 3 mLs (2.5 mg total) by nebulization every 6 (six) hours as needed for Wheezing. Rescue, Disp: 90 mL, Rfl: 6  ALCOHOL ANTISEPTIC PADS (ALCOHOL PREP PADS TOP), , Disp: , Rfl:   aspirin (ECOTRIN) 81 MG EC tablet, Take 1 tablet by mouth Daily., Disp: , Rfl:   atorvastatin (LIPITOR) 40 MG tablet, Take 1 tablet (40 mg total) by mouth once daily., Disp: 90 tablet, Rfl: 3  BD ULTRA-FINE OLU PEN NEEDLES 32 x 5/32 " Ndle, Uses 4 times daily, on multiple daily insulin injections, Disp: 130 each, Rfl: 12  carvedilol (COREG) 25 MG tablet, TAKE 1 TABLET TWICE DAILY, Disp: 180 tablet, Rfl: 3  cholecalciferol, vitamin D3, (VITAMIN D) 2,000 unit Cap, Take by mouth. 1 Capsule Oral Every morning, Disp: , Rfl:   diaper,brief,adult,disposable Misc, , Disp: , Rfl:   dicyclomine (BENTYL) 10 MG capsule, Take 1 capsule (10 mg total) by mouth 3 (three) times daily as needed (abdominal pain)., Disp: 60 capsule, Rfl: 3  duloxetine (CYMBALTA) 60 MG capsule, Take 1 capsule (60 mg total) by mouth once daily., Disp: 30 capsule, Rfl: 11  fluticasone (FLONASE) 50 mcg/actuation nasal spray, SHAKE LQ AND U 2 SPRAYS IEN QD, Disp: 1 Bottle, Rfl: 6  furosemide (LASIX) 40 MG tablet, TAKE 1 TABLET twice daily, Disp: 270 tablet, Rfl: 3  gabapentin (NEURONTIN) 100 MG capsule, TAKE 1 CAPSULE THREE TIMES DAILY  AND TAKE 3 CAPSULES AT BEDTIME, Disp: 540 capsule, Rfl: 2  insulin aspart (NOVOLOG) 100 unit/mL InPn pen, 23 units with meals plus correction scale. Max daily dose=120 units, Disp: 8 Box, Rfl: 3  insulin glargine (LANTUS SOLOSTAR) 100 unit/mL (3 mL) InPn pen, Inject 60 Units into the skin every evening., Disp: 4 Box, Rfl: 3  Lactobacillus acidophilus (PROBIOTIC) 10 billion cell Cap, Take 1 capsule by mouth once daily., Disp: , Rfl:   loratadine (CLARITIN) 10 mg tablet, Take 1 tablet (10 mg total) " "by mouth once daily., Disp: 90 tablet, Rfl: 4  MUSCLE RUB, WITH CAMPHOR, 4-30-10 % Crea, , Disp: , Rfl:   nitroGLYCERIN (NITROSTAT) 0.4 MG SL tablet, Place 1 tablet (0.4 mg total) under the tongue every 5 (five) minutes as needed., Disp: 25 tablet, Rfl: 2  nystatin (MYCOSTATIN) cream, Apply topically 2 (two) times daily., Disp: 120 g, Rfl: 3  ofloxacin (OCUFLOX) 0.3 % ophthalmic solution, Place 1 drop into the left eye 3 (three) times daily., Disp: 5 mL, Rfl: 1  ondansetron (ZOFRAN) 4 MG tablet, Take 1 tablet (4 mg total) by mouth every 8 (eight) hours as needed for Nausea., Disp: 30 tablet, Rfl: 3  (START ON 3/19/2018) oxyCODONE-acetaminophen (PERCOCET)  mg per tablet, Take 1 tablet by mouth 5 (five) times daily., Disp: 150 tablet, Rfl: 0  oxyCODONE-acetaminophen (PERCOCET)  mg per tablet, Take 1 tablet by mouth 5 (five) times daily., Disp: 150 tablet, Rfl: 0  pantoprazole (PROTONIX) 40 MG tablet, TAKE 1 TABLET(40 MG) BY MOUTH twice daily, Disp: 180 tablet, Rfl: 4  pen needle, diabetic (SURE-FINE PEN NEEDLES) 29 gauge x 1/2" Ndle, Use 4 times daily, Disp: 400 each, Rfl: 4  prednisoLONE acetate (PRED FORTE) 1 % DrpS, Place 1 drop into the left eye 3 (three) times daily., Disp: 5 mL, Rfl: 1  promethazine (PHENERGAN) 6.25 mg/5 mL syrup, Take 5 mLs (6.25 mg total) by mouth every 6 (six) hours as needed (cough)., Disp: 118 mL, Rfl: 0  tizanidine (ZANAFLEX) 4 MG tablet, Take 1 tablet (4 mg total) by mouth nightly as needed., Disp: 30 tablet, Rfl: 3  triamcinolone acetonide 0.1% (KENALOG) 0.1 % cream, Apply topically 3 (three) times daily., Disp: 28.4 g, Rfl: 0  VENTOLIN HFA 90 mcg/actuation inhaler, INHALE 2 PUFFS INTO THE LUNGS  EVERY 4 HOURS AS NEEDED FOR ASTHMA, Disp: 18 g, Rfl: 1    No current facility-administered medications for this visit.       Review of patient's allergies indicates:   -- Crestor (rosuvastatin)     --  Cramping in legs   -- Ezetimibe     --  Other reaction(s): abdominal pain, " Diarrhea   -- Hydrocodone     --  Other reaction(s): Itching   -- Lisinopril     --  Other reaction(s): cough   -- Sulfa (sulfonamide antibiotics)     --  Itching and Rash   -- Sulfamethoxazole    -- Sulfamethoxazole-trimethoprim    -- Trimethoprim    -- Valsartan     --  Other reaction(s): Angioedema              Review of Systems   Constitution: Negative for weight gain and weight loss.   Cardiovascular: Positive for dyspnea on exertion. Negative for chest pain.   Respiratory: Negative for shortness of breath.    Musculoskeletal: Positive for back pain and myalgias. Negative for joint pain, joint swelling and neck pain.   Gastrointestinal: Negative for abdominal pain and bowel incontinence.   Genitourinary: Negative for bladder incontinence.   Neurological: Positive for dizziness and numbness (bilateral hands better after injection).         Objective:        General: Henrietta is well-developed, well-nourished, appears stated age, in no acute distress, alert and oriented to time, place and person.     General    Vitals reviewed.  Constitutional: She is oriented to person, place, and time. She appears well-developed and well-nourished.   HENT:   Head: Normocephalic and atraumatic.   Pulmonary/Chest: Effort normal.   Neurological: She is alert and oriented to person, place, and time.   Psychiatric: She has a normal mood and affect. Her behavior is normal. Judgment and thought content normal.     General Musculoskeletal Exam   Gait: normal     Back (L-Spine & T-Spine) / Neck (C-Spine) Exam     Tenderness Right paramedian tenderness of the Sacrum. Left paramedian tenderness of the Sacrum.     Back (L-Spine & T-Spine) Range of Motion   Extension: 10   Flexion: 70   Lateral Bend Right: 10   Lateral Bend Left: 10   Rotation Right: 20   Rotation Left: 20     Spinal Sensation   Right Side Sensation  C-Spine Level: normal   L-Spine Level: normal  S-Spine Level: normal  Left Side Sensation  C-Spine Level: normal  L-Spine  Level: normal  S-Spine Level: normal    Other She has no scoliosis .  Spinal Kyphosis:  Absent      Right Hand/Wrist Exam     Tests   Phalens Sign: positive  Tinels Sign (Medial Nerve): positive    Atrophy   Thenar:  negative  Hypothenar:  negative      Left Hand/Wrist Exam     Tests   Phalens Sign: positive  Tinels Sign (Medial Nerve): positive    Atrophy  Thenar:  Negative  Hypothenar:  negative          Muscle Strength   Right Upper Extremity   Biceps: 5/5/5   Deltoid:  5/5  Triceps:  5/5  Wrist Extension: 5/5/5   : 5/5/5   Finger Flexors:  5/5  Left Upper Extremity  Biceps: 5/5/5   Deltoid:  5/5  Triceps:  5/5  Wrist Extension: 5/5/5   :  5/5/5   Finger Flexors:  5/5  Right Lower Extremity   Hip Flexion: 5/5   Quadriceps:  5/5   Anterior tibial:  5/5/5  EHL:  5/5  Left Lower Extremity   Hip Flexion: 5/5   Quadriceps:  5/5   Anterior tibial:  5/5/5   EHL:  5/5    Reflexes     Left Side  Biceps:  2+  Triceps:  2+  Brachioradialis:  2+  Quadriceps:  2+  Achilles:  2+  Left Nichols's Sign:  Absent  Babinski Sign:  absent    Right Side   Biceps:  2+  Triceps:  2+  Brachioradialis:  2+  Quadriceps:  2+  Achilles:  2+  Right Nichols's Sign:  absent  Babinski Sign:  absent    Vascular Exam     Right Pulses        Carotid:                  2+    Left Pulses        Carotid:                  2+    Capillary Refill  Right Hand: normal capillary refill  Left Hand: normal capillary refill              Assessment:       1. DDD (degenerative disc disease), lumbar    2. Spinal enthesopathy    3. Muscle spasm    4. Chronic bilateral low back pain without sciatica    5. Bilateral carpal tunnel syndrome           Plan:       Orders Placed This Encounter    X-Ray Lumbar Complete With Flex And Ext    MRI Lumbar Spine Without Contrast     1. Repeat Carpal tunnel injection on 2/2018, she would like another injection, but BP is high  2.  hand splints at night  3. Continue gabapentin 2 in am And 3 at night  4. Encourage her  to go back to the gym and to restart exercises from therapy.  She feels better when moving, she does not feel motivated.  She feels like her mind isn't into it  5. Continue tizanidine  6.  We discussed x-ray and MRI lumbar spine and consider injection, she is scared of injection, but will look at her back  7.  Her BP is really high today, she did not take meds.  She has an appointment in internal med at 11  8.   rtc 3 months      Follow-up: Follow-up in about 3 months (around 9/5/2018). If there are any questions prior to this, the patient was instructed to contact the office.

## 2018-06-05 ENCOUNTER — OFFICE VISIT (OUTPATIENT)
Dept: INTERNAL MEDICINE | Facility: CLINIC | Age: 78
End: 2018-06-05
Payer: MEDICARE

## 2018-06-05 ENCOUNTER — OFFICE VISIT (OUTPATIENT)
Dept: SPINE | Facility: CLINIC | Age: 78
End: 2018-06-05
Attending: PHYSICAL MEDICINE & REHABILITATION
Payer: MEDICARE

## 2018-06-05 ENCOUNTER — HOSPITAL ENCOUNTER (OUTPATIENT)
Dept: RADIOLOGY | Facility: OTHER | Age: 78
Discharge: HOME | End: 2018-06-05
Attending: PHYSICAL MEDICINE & REHABILITATION
Payer: MEDICARE

## 2018-06-05 VITALS
SYSTOLIC BLOOD PRESSURE: 220 MMHG | WEIGHT: 229 LBS | HEART RATE: 85 BPM | BODY MASS INDEX: 34.71 KG/M2 | DIASTOLIC BLOOD PRESSURE: 97 MMHG | HEIGHT: 68 IN

## 2018-06-05 DIAGNOSIS — E11.59 OBESITY, DIABETES, AND HYPERTENSION SYNDROME: ICD-10-CM

## 2018-06-05 DIAGNOSIS — I77.819 ECTATIC AORTA: ICD-10-CM

## 2018-06-05 DIAGNOSIS — R29.898 DECREASED STRENGTH OF TRUNK AND BACK: ICD-10-CM

## 2018-06-05 DIAGNOSIS — Z79.4 TYPE 2 DIABETES MELLITUS WITH DIABETIC POLYNEUROPATHY, WITH LONG-TERM CURRENT USE OF INSULIN: ICD-10-CM

## 2018-06-05 DIAGNOSIS — Z79.4 TYPE 2 DIABETES MELLITUS WITH STAGE 3 CHRONIC KIDNEY DISEASE, WITH LONG-TERM CURRENT USE OF INSULIN: ICD-10-CM

## 2018-06-05 DIAGNOSIS — M47.819 FACET ARTHROPATHY: ICD-10-CM

## 2018-06-05 DIAGNOSIS — G89.29 CHRONIC BILATERAL LOW BACK PAIN WITHOUT SCIATICA: ICD-10-CM

## 2018-06-05 DIAGNOSIS — I10 ESSENTIAL HYPERTENSION: Chronic | ICD-10-CM

## 2018-06-05 DIAGNOSIS — M51.36 DDD (DEGENERATIVE DISC DISEASE), LUMBAR: Primary | ICD-10-CM

## 2018-06-05 DIAGNOSIS — Z79.4 INSULIN LONG-TERM USE: ICD-10-CM

## 2018-06-05 DIAGNOSIS — G56.03 BILATERAL CARPAL TUNNEL SYNDROME: ICD-10-CM

## 2018-06-05 DIAGNOSIS — G47.33 OSA ON CPAP: Chronic | ICD-10-CM

## 2018-06-05 DIAGNOSIS — Z00.00 ENCOUNTER FOR PREVENTIVE HEALTH EXAMINATION: Primary | ICD-10-CM

## 2018-06-05 DIAGNOSIS — M54.50 CHRONIC BILATERAL LOW BACK PAIN WITHOUT SCIATICA: ICD-10-CM

## 2018-06-05 DIAGNOSIS — M54.9 OTHER CHRONIC BACK PAIN: Chronic | ICD-10-CM

## 2018-06-05 DIAGNOSIS — N18.30 TYPE 2 DIABETES MELLITUS WITH STAGE 3 CHRONIC KIDNEY DISEASE, WITH LONG-TERM CURRENT USE OF INSULIN: ICD-10-CM

## 2018-06-05 DIAGNOSIS — N18.30 STAGE 3 CHRONIC KIDNEY DISEASE: ICD-10-CM

## 2018-06-05 DIAGNOSIS — E66.9 OBESITY, DIABETES, AND HYPERTENSION SYNDROME: ICD-10-CM

## 2018-06-05 DIAGNOSIS — E11.22 TYPE 2 DIABETES MELLITUS WITH STAGE 3 CHRONIC KIDNEY DISEASE, WITH LONG-TERM CURRENT USE OF INSULIN: ICD-10-CM

## 2018-06-05 DIAGNOSIS — I70.0 AORTIC ATHEROSCLEROSIS: ICD-10-CM

## 2018-06-05 DIAGNOSIS — I25.119 CORONARY ARTERY DISEASE INVOLVING NATIVE CORONARY ARTERY OF NATIVE HEART WITH ANGINA PECTORIS: ICD-10-CM

## 2018-06-05 DIAGNOSIS — E11.42 DIABETIC POLYNEUROPATHY ASSOCIATED WITH TYPE 2 DIABETES MELLITUS: ICD-10-CM

## 2018-06-05 DIAGNOSIS — E78.5 HYPERLIPIDEMIA, UNSPECIFIED HYPERLIPIDEMIA TYPE: ICD-10-CM

## 2018-06-05 DIAGNOSIS — K21.9 GASTROESOPHAGEAL REFLUX DISEASE WITHOUT ESOPHAGITIS: ICD-10-CM

## 2018-06-05 DIAGNOSIS — E21.3 HYPERPARATHYROIDISM: ICD-10-CM

## 2018-06-05 DIAGNOSIS — I15.2 OBESITY, DIABETES, AND HYPERTENSION SYNDROME: ICD-10-CM

## 2018-06-05 DIAGNOSIS — M51.36 DDD (DEGENERATIVE DISC DISEASE), LUMBAR: ICD-10-CM

## 2018-06-05 DIAGNOSIS — E66.9 OBESITY (BMI 30.0-34.9): ICD-10-CM

## 2018-06-05 DIAGNOSIS — E11.42 TYPE 2 DIABETES MELLITUS WITH DIABETIC POLYNEUROPATHY, WITH LONG-TERM CURRENT USE OF INSULIN: ICD-10-CM

## 2018-06-05 DIAGNOSIS — M46.06 SPINAL ENTHESOPATHY OF LUMBAR REGION: ICD-10-CM

## 2018-06-05 DIAGNOSIS — I77.1 TORTUOUS AORTA: ICD-10-CM

## 2018-06-05 DIAGNOSIS — E11.69 OBESITY, DIABETES, AND HYPERTENSION SYNDROME: ICD-10-CM

## 2018-06-05 DIAGNOSIS — G89.29 OTHER CHRONIC BACK PAIN: Chronic | ICD-10-CM

## 2018-06-05 DIAGNOSIS — M46.00 SPINAL ENTHESOPATHY: ICD-10-CM

## 2018-06-05 DIAGNOSIS — I50.30 DIASTOLIC CONGESTIVE HEART FAILURE, UNSPECIFIED HF CHRONICITY: ICD-10-CM

## 2018-06-05 DIAGNOSIS — M62.838 MUSCLE SPASM: ICD-10-CM

## 2018-06-05 PROCEDURE — 99999 PR PBB SHADOW E&M-EST. PATIENT-LVL IV: CPT | Mod: PBBFAC,,, | Performed by: NURSE PRACTITIONER

## 2018-06-05 PROCEDURE — 72114 X-RAY EXAM L-S SPINE BENDING: CPT | Mod: 26,,, | Performed by: RADIOLOGY

## 2018-06-05 PROCEDURE — 99214 OFFICE O/P EST MOD 30 MIN: CPT | Mod: S$GLB,,, | Performed by: PHYSICAL MEDICINE & REHABILITATION

## 2018-06-05 PROCEDURE — 99499 UNLISTED E&M SERVICE: CPT | Mod: S$GLB,,, | Performed by: NURSE PRACTITIONER

## 2018-06-05 PROCEDURE — 3080F DIAST BP >= 90 MM HG: CPT | Mod: CPTII,S$GLB,, | Performed by: PHYSICAL MEDICINE & REHABILITATION

## 2018-06-05 PROCEDURE — 72114 X-RAY EXAM L-S SPINE BENDING: CPT | Mod: TC,FY

## 2018-06-05 PROCEDURE — 3077F SYST BP >= 140 MM HG: CPT | Mod: CPTII,S$GLB,, | Performed by: PHYSICAL MEDICINE & REHABILITATION

## 2018-06-05 PROCEDURE — 99499 UNLISTED E&M SERVICE: CPT | Mod: S$GLB,,, | Performed by: PHYSICAL MEDICINE & REHABILITATION

## 2018-06-05 PROCEDURE — 3079F DIAST BP 80-89 MM HG: CPT | Mod: CPTII,S$GLB,, | Performed by: NURSE PRACTITIONER

## 2018-06-05 PROCEDURE — G0439 PPPS, SUBSEQ VISIT: HCPCS | Mod: S$GLB,,, | Performed by: NURSE PRACTITIONER

## 2018-06-05 PROCEDURE — 99999 PR PBB SHADOW E&M-EST. PATIENT-LVL III: CPT | Mod: PBBFAC,,, | Performed by: PHYSICAL MEDICINE & REHABILITATION

## 2018-06-05 PROCEDURE — 3075F SYST BP GE 130 - 139MM HG: CPT | Mod: CPTII,S$GLB,, | Performed by: NURSE PRACTITIONER

## 2018-06-05 NOTE — PATIENT INSTRUCTIONS
Counseling and Referral of Other Preventative  (Italic type indicates deductible and co-insurance are waived)    Patient Name: Henrietta Villasenor  Today's Date: 6/5/2018    Health Maintenance       Date Due Completion Date    Sign Pain Contract 02/18/1958 ---    Complete Opioid Risk Tool 02/18/1958 ---    Urine Drug Screen 02/18/1958 ---    Naloxone Prescription 02/18/1958 ---    DEXA SCAN 11/02/2014 11/2/2011 (Done)    Override on 11/2/2011: Done (no repeat bmd required)    Foot Exam 06/16/2018 6/16/2017    Override on 8/31/2016: Done    Override on 1/7/2016: Done    Override on 2/24/2015: Done    Influenza Vaccine 08/01/2018 12/8/2017    Hemoglobin A1c 10/19/2018 4/19/2018    Eye Exam 01/08/2019 1/8/2018    Lipid Panel 04/19/2019 4/19/2018    Colonoscopy 06/14/2020 6/14/2017    Override on 11/2/2010: Done (done per Chris Storey due 11/02/2013)    TETANUS VACCINE 03/20/2024 3/20/2014        No orders of the defined types were placed in this encounter.    The following information is provided to all patients.  This information is to help you find resources for any of the problems found today that may be affecting your health:                Living healthy guide: www.[facility].[place].gov      Understanding Diabetes: www.diabetes.org      Eating healthy: www.cdc.gov/healthyweight      CDC home safety checklist: www.cdc.gov/steadi/patient.html      Agency on Aging: www.goea.[place].[facility]      Alcoholics anonymous (AA): www.aa.org      Physical Activity: www.felix.nih.gov/id7cbxw      Tobacco use: www.quitwithusla.org

## 2018-06-06 VITALS
OXYGEN SATURATION: 99 % | BODY MASS INDEX: 34.68 KG/M2 | SYSTOLIC BLOOD PRESSURE: 130 MMHG | HEIGHT: 68 IN | HEART RATE: 79 BPM | WEIGHT: 228.81 LBS | DIASTOLIC BLOOD PRESSURE: 85 MMHG

## 2018-06-06 NOTE — PROGRESS NOTES
"Henrietta Villasenor presented for a  Medicare AWV and comprehensive Health Risk Assessment today. The following components were reviewed and updated:    · Medical history  · Family History  · Social history  · Allergies and Current Medications  · Health Risk Assessment  · Health Maintenance  · Care Team     ** See Completed Assessments for Annual Wellness Visit within the encounter summary.**       The following assessments were completed:  · Living Situation  · CAGE  · Depression Screening  · Timed Get Up and Go  · Whisper Test  · Cognitive Function Screening  · Nutrition Screening  · ADL Screening  · PAQ Screening          Vitals:    06/05/18 1131 06/05/18 1210   BP: (!) 160/80 130/85   BP Location: Left arm    Patient Position: Sitting    Pulse: 79    SpO2: 99%    Weight: 103.8 kg (228 lb 13.4 oz)    Height: 5' 8" (1.727 m)      Body mass index is 34.79 kg/m².     Physical Exam   Constitutional: She is oriented to person, place, and time.   Obese   HENT:   Head: Normocephalic and atraumatic. Not macrocephalic and not microcephalic. Head is without raccoon's eyes, without Guallpa's sign, without abrasion, without contusion, without laceration, without right periorbital erythema and without left periorbital erythema. Hair is normal.   Right Ear: No lacerations. No drainage, swelling or tenderness. No foreign bodies. No mastoid tenderness. Tympanic membrane is not injected, not scarred, not perforated, not erythematous, not retracted and not bulging. Tympanic membrane mobility is normal. No middle ear effusion. No hemotympanum. No decreased hearing is noted.   Left Ear: No lacerations. No drainage, swelling or tenderness. No foreign bodies. No mastoid tenderness. Tympanic membrane is not injected, not scarred, not perforated, not erythematous, not retracted and not bulging. Tympanic membrane mobility is normal.  No middle ear effusion. No hemotympanum. No decreased hearing is noted.   Nose: Nose normal. No mucosal edema, " rhinorrhea, nose lacerations, sinus tenderness or nasal deformity.   Mouth/Throat: Uvula is midline.   Eyes: Conjunctivae and lids are normal. No scleral icterus.   Neck: Trachea normal. Neck supple. No spinous process tenderness and no muscular tenderness present. No neck rigidity. No edema, no erythema and normal range of motion present. No thyroid mass and no thyromegaly present.   Cardiovascular: Normal rate, regular rhythm and intact distal pulses.  Exam reveals no gallop and no friction rub.    No murmur heard.  Pulmonary/Chest: Effort normal and breath sounds normal.   Abdominal: Soft. Bowel sounds are normal.   Musculoskeletal: Normal range of motion.   Lymphadenopathy:        Head (right side): No submental, no submandibular, no tonsillar, no preauricular and no posterior auricular adenopathy present.        Head (left side): No submental, no submandibular, no tonsillar, no preauricular, no posterior auricular and no occipital adenopathy present.   Neurological: She is alert and oriented to person, place, and time.   Skin: Skin is warm and dry.   Psychiatric: She has a normal mood and affect. Her behavior is normal. Judgment and thought content normal.   Nursing note and vitals reviewed.      Diagnoses and health risks identified today and associated recommendations/orders:    1. Encounter for preventive health examination  Annual Health Risk Assessment (HRA) visit today.  Counseling and referral of health maintenance and preventative health measures performed.  Patient given annual wellness paperwork to take home.  Encouraged to return in 1 year for subsequent HRA visit.     2. Aortic atherosclerosis  Chronic. Stable. Continue current treatment plan as previously prescribed by PCP.    3. Tortuous aorta  Chronic. Stable. Noted on Chest X-ray from 9/3/15. Continue current treatment plan as previously prescribed by PCP.    4. Ectatic aorta  Chronic. Stable. Noted on Chest X-ray from 9/3/15. Continue current  treatment plan as previously prescribed by PCP.    5. Facet arthropathy  Chronic. Stable. Noted on X-ray Lumbar Spine from 2/3/15. Continue current treatment plan as previously prescribed by PCP.    6. Insulin long-term use  Chronic. Stable. Continue current treatment plan. Monitored by Endocrine.    7. Diastolic congestive heart failure, unspecified HF chronicity  Chronic. Stable. Continue current treatment plan as previously prescribed by PCP.    8. Coronary artery disease involving native coronary artery of native heart with angina pectoris  Chronic. Stable. Continue current treatment plan. Monitored by PCP.    9. Obesity, diabetes, and hypertension syndrome  Chronic. Stable. Continue current treatment plan as previously prescribed by PCP.    10. Type 2 diabetes mellitus with stage 3 chronic kidney disease, with long-term current use of insulin  Chronic. Stable. Continue current treatment plan as previously prescribed by PCP.    11. Type 2 diabetes mellitus with diabetic polyneuropathy, with long-term current use of insulin  Chronic. Stable. Continue current treatment plan as previously prescribed by PCP.    12. Essential hypertension  Chronic. Stable. Uncontrolled. Encouraged to increase exercise as tolerated (moderate-intensity aerobic activity and muscle-strengthening activities) improve diet to heart healthy, low sodium diet. Continue current treatment plan as previously prescribed by PCP.    13. Hyperlipidemia, unspecified hyperlipidemia type  Chronic. Stable. Continue current treatment plan as previously prescribed by PCP.    14. Stage 3 chronic kidney disease  Chronic. Stable. Continue current treatment plan. Monitored by Nephrology.    15. Obesity (BMI 30.0-34.9)  Chronic. Stable. Encouraged to increase exercise as tolerated and improve diet to heart healthy, low sodium diet. Continue current treatment plan as previously prescribed by PCP.    16. Insulin dependent diabetes mellitus  Chronic. Stable.  Uncontrolled. Hgb A1c=7.7 from 4/19/18. Continue current treatment plan as previously prescribed by PCP.    17. Hyperparathyroidism  Chronic. Stable. Monitored by PCP.    18. Decreased strength of trunk and back  Chronic. Stable. Monitored by [facility].    19. MARLON on CPAP  Chronic. Stable. Continue current treatment plan. Monitored by Sleep Medicine.    20. Spinal enthesopathy of lumbar region  Chronic. Stable. Continue current treatment plan. Monitored by [facility].    21. Other chronic back pain  Chronic. Stable. Continue current treatment plan. Monitored by [facility].    22. Diabetic polyneuropathy associated with type 2 diabetes mellitus  Chronic. Stable. Continue current treatment plan. Monitored by Endocrine.    23. Gastroesophageal reflux disease without esophagitis  Chronic. Stable. Continue current treatment plan. Monitored by Gastroenterology.        Provided Henrietta with a 5-10 year written screening schedule and personal prevention plan. Recommendations were developed using the USPSTF age appropriate recommendations. Education, counseling, and referrals were provided as needed. After Visit Summary printed and given to patient which includes a list of additional screenings\tests needed.    No Follow-up on file.    Kerwin Ackerman NP

## 2018-06-12 ENCOUNTER — HOSPITAL ENCOUNTER (OUTPATIENT)
Dept: RADIOLOGY | Facility: OTHER | Age: 78
Discharge: HOME | End: 2018-06-12
Attending: NURSE PRACTITIONER
Payer: MEDICARE

## 2018-06-12 ENCOUNTER — TELEPHONE (OUTPATIENT)
Dept: SPINE | Facility: CLINIC | Age: 78
End: 2018-06-12

## 2018-06-12 ENCOUNTER — HOSPITAL ENCOUNTER (OUTPATIENT)
Dept: RADIOLOGY | Facility: OTHER | Age: 78
Discharge: HOME | End: 2018-06-12
Attending: PHYSICAL MEDICINE & REHABILITATION
Payer: MEDICARE

## 2018-06-12 DIAGNOSIS — M51.36 DDD (DEGENERATIVE DISC DISEASE), LUMBAR: ICD-10-CM

## 2018-06-12 DIAGNOSIS — M48.061 SPINAL STENOSIS OF LUMBAR REGION, UNSPECIFIED WHETHER NEUROGENIC CLAUDICATION PRESENT: ICD-10-CM

## 2018-06-12 DIAGNOSIS — G89.29 CHRONIC BILATERAL LOW BACK PAIN WITHOUT SCIATICA: ICD-10-CM

## 2018-06-12 DIAGNOSIS — M47.816 LUMBAR SPONDYLOSIS: Primary | ICD-10-CM

## 2018-06-12 DIAGNOSIS — M54.50 CHRONIC BILATERAL LOW BACK PAIN WITHOUT SCIATICA: ICD-10-CM

## 2018-06-12 DIAGNOSIS — Z78.0 ASYMPTOMATIC POSTMENOPAUSAL STATE: ICD-10-CM

## 2018-06-12 PROCEDURE — 77080 DXA BONE DENSITY AXIAL: CPT | Mod: TC

## 2018-06-12 PROCEDURE — 72148 MRI LUMBAR SPINE W/O DYE: CPT | Mod: TC

## 2018-06-12 PROCEDURE — 72148 MRI LUMBAR SPINE W/O DYE: CPT | Mod: 26,,, | Performed by: RADIOLOGY

## 2018-06-12 PROCEDURE — 77080 DXA BONE DENSITY AXIAL: CPT | Mod: 26,,, | Performed by: RADIOLOGY

## 2018-06-12 NOTE — TELEPHONE ENCOUNTER
----- Message from Jeff Mckeon MA sent at 6/12/2018  3:01 PM CDT -----  Patient would like to try injection.  ----- Message -----  From: Lizy Le MD  Sent: 6/12/2018   1:23 PM  To: Mimi JAY Staff    Please let her know some degenerative changes in the back.  We could try an epidural steroid injection with pain management to help the right leg pain?

## 2018-06-13 ENCOUNTER — TELEPHONE (OUTPATIENT)
Dept: SPINE | Facility: CLINIC | Age: 78
End: 2018-06-13

## 2018-06-13 NOTE — TELEPHONE ENCOUNTER
----- Message from Alexandria Jacobson LPN sent at 6/13/2018  9:35 AM CDT -----  Patient has been scheduled for a Right L5 TF JANEE on 7-30-18 with Dr. Cooper.          Thank You,  Alexandria

## 2018-06-14 ENCOUNTER — TELEPHONE (OUTPATIENT)
Dept: OPHTHALMOLOGY | Facility: CLINIC | Age: 78
End: 2018-06-14

## 2018-06-14 NOTE — TELEPHONE ENCOUNTER
Tried to call pt to confirm surgery with Dr Valle on 6/28/18.  Voicemail was full so unable to leave a message.

## 2018-06-22 ENCOUNTER — OFFICE VISIT (OUTPATIENT)
Dept: INTERNAL MEDICINE | Facility: CLINIC | Age: 78
End: 2018-06-22
Payer: MEDICARE

## 2018-06-22 VITALS
BODY MASS INDEX: 35.34 KG/M2 | WEIGHT: 233.19 LBS | DIASTOLIC BLOOD PRESSURE: 70 MMHG | SYSTOLIC BLOOD PRESSURE: 128 MMHG | HEIGHT: 68 IN

## 2018-06-22 DIAGNOSIS — K21.9 GASTROESOPHAGEAL REFLUX DISEASE WITHOUT ESOPHAGITIS: ICD-10-CM

## 2018-06-22 DIAGNOSIS — E78.5 HYPERLIPIDEMIA, UNSPECIFIED HYPERLIPIDEMIA TYPE: ICD-10-CM

## 2018-06-22 DIAGNOSIS — Z79.4 TYPE 2 DIABETES MELLITUS WITH DIABETIC POLYNEUROPATHY, WITH LONG-TERM CURRENT USE OF INSULIN: ICD-10-CM

## 2018-06-22 DIAGNOSIS — H81.13 BENIGN PAROXYSMAL POSITIONAL VERTIGO DUE TO BILATERAL VESTIBULAR DISORDER: Primary | ICD-10-CM

## 2018-06-22 DIAGNOSIS — E11.42 TYPE 2 DIABETES MELLITUS WITH DIABETIC POLYNEUROPATHY, WITH LONG-TERM CURRENT USE OF INSULIN: ICD-10-CM

## 2018-06-22 DIAGNOSIS — E66.9 OBESITY (BMI 30.0-34.9): ICD-10-CM

## 2018-06-22 DIAGNOSIS — R06.02 SHORTNESS OF BREATH: ICD-10-CM

## 2018-06-22 PROCEDURE — 3078F DIAST BP <80 MM HG: CPT | Mod: CPTII,S$GLB,, | Performed by: INTERNAL MEDICINE

## 2018-06-22 PROCEDURE — 3074F SYST BP LT 130 MM HG: CPT | Mod: CPTII,S$GLB,, | Performed by: INTERNAL MEDICINE

## 2018-06-22 PROCEDURE — 99499 UNLISTED E&M SERVICE: CPT | Mod: S$GLB,,, | Performed by: INTERNAL MEDICINE

## 2018-06-22 PROCEDURE — 99999 PR PBB SHADOW E&M-EST. PATIENT-LVL IV: CPT | Mod: PBBFAC,,, | Performed by: INTERNAL MEDICINE

## 2018-06-22 PROCEDURE — 99214 OFFICE O/P EST MOD 30 MIN: CPT | Mod: S$GLB,,, | Performed by: INTERNAL MEDICINE

## 2018-06-22 RX ORDER — OXYCODONE AND ACETAMINOPHEN 10; 325 MG/1; MG/1
1 TABLET ORAL
Qty: 150 TABLET | Refills: 0 | Status: SHIPPED | OUTPATIENT
Start: 2018-07-20 | End: 2018-07-20 | Stop reason: SDUPTHER

## 2018-06-22 RX ORDER — OXYCODONE AND ACETAMINOPHEN 10; 325 MG/1; MG/1
1 TABLET ORAL
Qty: 150 TABLET | Refills: 0 | Status: SHIPPED | OUTPATIENT
Start: 2018-08-20 | End: 2018-08-15

## 2018-06-22 RX ORDER — ALBUTEROL SULFATE 90 UG/1
2 AEROSOL, METERED RESPIRATORY (INHALATION) EVERY 4 HOURS PRN
Qty: 18 G | Refills: 1 | Status: SHIPPED | OUTPATIENT
Start: 2018-06-22 | End: 2019-11-18 | Stop reason: SDUPTHER

## 2018-06-22 RX ORDER — OXYCODONE AND ACETAMINOPHEN 10; 325 MG/1; MG/1
1 TABLET ORAL
Qty: 150 TABLET | Refills: 0 | Status: SHIPPED | OUTPATIENT
Start: 2018-06-22 | End: 2018-08-23 | Stop reason: SDUPTHER

## 2018-06-22 RX ORDER — DIAZEPAM 5 MG/1
TABLET ORAL
Qty: 60 TABLET | Refills: 2 | Status: SHIPPED | OUTPATIENT
Start: 2018-06-22 | End: 2018-09-19

## 2018-06-22 NOTE — PROGRESS NOTES
CHIEF COMPLAINT: Follow up of dizziness, back pain, hypertension, diabetes     HISTORY OF PRESENT ILLNESS: This is a 78-year-old woman who presents for follow up of above.      Dizziness is better. She took diazepam 2 mg twice daily for 10 days which helped the dizziness tremendously. She only got 20 pillls, so she is out. She now has less dizziness with change in position of the head.  Nausea is better. She was sedated with the diazepam     Left leg is better. Feet are doing well. Knee is not giving out.     Heartburn is controlled. She is taking pantoprazole 40 mg twice daily and ranitidine 300 mg at night.  She is taking dicyclomine 10 mg three times daily as needed which helps somewhat. Symptoms occur 1-2 times a month.  No nausea, vomiting, constipation, diarrhea. HEr bowels move regularly with metamucil.      She continues to have signifcant issues with her back. She saw Dr Le 6/5/18 and had MRI lumbar spine 6/12/18 - Moderate degenerative change at L4-5.  Dr Le recommended epidural - she is not sure if she wants to do the epidural.  She is no longer  gabapentin 100 mg 1 capsule three times daily and 3 at bedtime. She did not like how the gabapentin made her feel.  She takes oxycodone apap 10/325 one tablet 3-5 times daily depending on her pain. SHe takes Cymbalta 60 mg daily which helps her pain and her mood.        She continues to take aspirin 81 mg daily, coreg 25 mg twice daily and lasix 40 mg twice daily for her diastolic dysfunction and hypertension. She denies lightheadedness. No chest pain or shortness of breath. She is now taking atorvastatin 40 mg once daily for her cholesterol.      She is taking lantus 60 units at night and Humalog 23 units with meals. Blood sugars have bene up and down.   No hypoglycemia.       PAST MEDICAL HISTORY:   1. Diabetes mellitus   2. Hypertension   3. Hyperlipidemia   4. Left heart catheterization January 2007 which revealed luminal irregularities in the LAD,  "left circumflex and right coronary artery. She had diastolic dysfunction and patent renal arteries.   5. Sleep apnea   6. Obesity.   7. Nephrolithiasis status post lithotripsy.   8. Reflux - had nonerosive gastropathy on EGD September 2007. Gastritis on EGD 9/2010  9. Hidradenitis suppurativa.   10. History of diverticulitis with a hospitalized October 2007.   11. Migraines   12. Obesity.   13. 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    14. Total hysterectomy in 1969.   15. Cholecystectomy was done at that time as well.   16. 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   17. Colon polyps on colonscopy 11/2010 - due 2013   18. Hospitalization 12/10 for e coli sepsis due to left ureteral stone with left nephrostomy tube in [place], [place]   19. Left knee replacement 9/2012      MEDICATIONS and ALLERGIES: Updated on EPIC.     PHYSICAL EXAMINATION:    /70 (BP Location: Right arm, Patient Position: Sitting, BP Method: Medium (Manual))   Ht 5' 8" (1.727 m)   Wt 105.8 kg (233 lb 3.2 oz)   LMP  (LMP Unknown)   BMI 35.46 kg/m²      GENERAL: She is alert, oriented, no apparent distress. Affect within   normal limits.  Conjunctiva anicteric. . Oropharynx clear.   NECK: Supple.   Respiratory: Effort normal. Lungs clear  HEART: Regular rate and rhythm without murmurs, gallops or rubs.   No lower extremity edema.       Labs 4/19/18 reviewed -      ASSESSMENT AND PLAN:   1. Dizziness - likely benign positional vertigo - better on valium - 5 mg 1/2-1 tablet twice daily as needed  2. Left leg pain -sounds like neuropathic pain from the back. See if valium helps  3. HTN -  monitor BP at home  3. Gerd - stable pantoprazole to 40 mg daily and  ranitidine 300 mg nightly  4. Diabetes with neuropathy- labs today   5. Hyperlipidemia - on lipitor  6. Allergic rhinitis - stable  7. Diastolic congestive heart failure - stable.    8. CRI -stable  9. Obesity - " discussed diet, exercise and weight loss  10. CAD -risk factor modification  11. Aortic atherosclerosis and possible mesenteric artery stenosis- risk factor modification  12. Tortuous aorta - HTN controlled  13. Colon polyps - Colonoscopy in 8/2013 - 2 polyps. Flex sig 11/2013 - mild granular mucosa.  14. hyperparathryodisism -saw nephrology    Recommended colonoscopy in 8/2018. Attempted colonoscopy 11/2016 du to diarrhea which has resolved. She had poor prep with hyperplastic rectal biopsy.   MMG 1/18  Prevnar 10/15 and flu shot 11/16  I will see her back in 3 months, sooner if problems

## 2018-06-26 ENCOUNTER — TELEPHONE (OUTPATIENT)
Dept: OPHTHALMOLOGY | Facility: CLINIC | Age: 78
End: 2018-06-26

## 2018-06-26 NOTE — H&P
History    Chief complaint:  Painless progressive vision loss    Present Ilness/Diagnosis: Nuclear sclerotic Cataract    Past Medical History:  has a past medical history of Abnormality of lung (11/08/2011); Anxiety; Arthritis; CAD (coronary artery disease); Calculus of ureter (2/22/2011); Cataract; CHF (congestive heart failure); Chronic back pain (7/29/2012); Colon polyp (9/2010; 11/2010); Colon polyps (7/29/2012); Coronary artery disease involving native coronary artery of native heart with angina pectoris; Depression; Diabetes mellitus; Diabetes mellitus type II; Diverticulitis large intestine (7/27/2015); Diverticulosis (09/25/2010; 11/02/2010; 11/08/2011; 7/29/2012); Duodenal disorder (08/25/2011); Duodenal ulcer, unspecified as acute or chronic, without hemorrhage, perforation, or obstruction (8/24/2011); E. 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); 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); Obesity; MARLON (obstructive sleep apnea); Pain; Peripheral neuropathy; Pneumonia; Polyneuropathy; Postherpetic neuralgia; Recurrent nephrolithiasis; S/P knee replacement (10/2/2012); S/P TKR (total knee replacement) (12/26/2012); Sensorineural hearing loss of both ears; Thyroid disease (11/08/2011); Trouble in sleeping; Type II or unspecified type diabetes mellitus with neurological manifestations, not stated as uncontrolled(250.60); Type II or unspecified type diabetes  "mellitus with peripheral circulatory disorders, not stated as uncontrolled(250.70); and Type II or unspecified type diabetes mellitus with renal manifestations, not stated as uncontrolled(250.40).    Family History/Social History: refer to chart    Allergies:   Review of patient's allergies indicates:   Allergen Reactions    Crestor [rosuvastatin]      Cramping in legs    Ezetimibe      Other reaction(s): abdominal pain, Diarrhea    Hydrocodone      Other reaction(s): Itching    Lisinopril      Other reaction(s): cough      Sulfa (sulfonamide antibiotics)      Itching and Rash      Sulfamethoxazole     Sulfamethoxazole-trimethoprim     Trimethoprim     Valsartan      Other reaction(s): Angioedema       Current Medications: No current facility-administered medications for this encounter.     Current Outpatient Prescriptions:     ACCU-CHEK FASTCLIX Misc, , Disp: , Rfl:     ACCU-CHEK SMARTVIEW TEST STRIP Strp, TEST THREE TIMES DAILY, Disp: 300 strip, Rfl: 3    albuterol (PROVENTIL) 2.5 mg /3 mL (0.083 %) nebulizer solution, Take 3 mLs (2.5 mg total) by nebulization every 6 (six) hours as needed for Wheezing. Rescue, Disp: 90 mL, Rfl: 6    albuterol (VENTOLIN HFA) 90 mcg/actuation inhaler, Inhale 2 puffs by mouth into the lungs every 4 (four) hours as needed for Wheezing. Rescue, Disp: 18 g, Rfl: 1    ALCOHOL ANTISEPTIC PADS (ALCOHOL PREP PADS TOP), , Disp: , Rfl:     aspirin (ECOTRIN) 81 MG EC tablet, Take 1 tablet by mouth Daily., Disp: , Rfl:     atorvastatin (LIPITOR) 40 MG tablet, Take 1 tablet (40 mg total) by mouth once daily., Disp: 90 tablet, Rfl: 3    BD ULTRA-FINE OLU PEN NEEDLES 32 x 5/32 " Ndle, Uses 4 times daily, on multiple daily insulin injections, Disp: 130 each, Rfl: 12    carvedilol (COREG) 25 MG tablet, TAKE 1 TABLET TWICE DAILY, Disp: 180 tablet, Rfl: 3    cholecalciferol, vitamin D3, (VITAMIN D) 2,000 unit Cap, Take by mouth. 1 Capsule Oral Every morning, Disp: , Rfl:     " diazePAM (VALIUM) 5 MG tablet, take half a tablet to one tablet by mouth twice daily for dizziness, Disp: 60 tablet, Rfl: 2    duloxetine (CYMBALTA) 60 MG capsule, Take 1 capsule (60 mg total) by mouth once daily., Disp: 30 capsule, Rfl: 11    fluticasone (FLONASE) 50 mcg/actuation nasal spray, SHAKE LQ AND U 2 SPRAYS IEN QD, Disp: 1 Bottle, Rfl: 6    furosemide (LASIX) 40 MG tablet, TAKE 1 TABLET twice daily, Disp: 270 tablet, Rfl: 3    insulin aspart (NOVOLOG) 100 unit/mL InPn pen, 23 units with meals plus correction scale. Max daily dose=120 units (Patient taking differently: Inject 23 Units into the skin 3 (three) times daily with meals. 23 units with meals plus correction scale. Max daily dose=120 units), Disp: 8 Box, Rfl: 3    insulin glargine (LANTUS SOLOSTAR) 100 unit/mL (3 mL) InPn pen, Inject 60 Units into the skin every evening., Disp: 4 Box, Rfl: 3    Lactobacillus acidophilus (PROBIOTIC) 10 billion cell Cap, Take 1 capsule by mouth once daily., Disp: , Rfl:     loratadine (CLARITIN) 10 mg tablet, TAKE 1 TABLET EVERY DAY, Disp: 90 tablet, Rfl: 4    MUSCLE RUB, WITH CAMPHOR, 4-30-10 % Crea, , Disp: , Rfl:     nitroGLYCERIN (NITROSTAT) 0.4 MG SL tablet, Place 1 tablet (0.4 mg total) under the tongue every 5 (five) minutes as needed., Disp: 25 tablet, Rfl: 2    ondansetron (ZOFRAN) 4 MG tablet, Take 1 tablet (4 mg total) by mouth every 8 (eight) hours as needed for Nausea., Disp: 30 tablet, Rfl: 3    oxyCODONE-acetaminophen (PERCOCET)  mg per tablet, Take 1 tablet by mouth 5 (five) times daily., Disp: 150 tablet, Rfl: 0    [START ON 7/20/2018] oxyCODONE-acetaminophen (PERCOCET)  mg per tablet, Take 1 tablet by mouth 5 (five) times daily., Disp: 150 tablet, Rfl: 0    [START ON 8/20/2018] oxyCODONE-acetaminophen (PERCOCET)  mg per tablet, Take 1 tablet by mouth 5 (five) times daily., Disp: 150 tablet, Rfl: 0    pantoprazole (PROTONIX) 40 MG tablet, TAKE 1 TABLET(40 MG) BY MOUTH  "twice daily, Disp: 180 tablet, Rfl: 4    pen needle, diabetic (SURE-FINE PEN NEEDLES) 29 gauge x 1/2" Ndle, Use 4 times daily, Disp: 400 each, Rfl: 4    ranitidine (ZANTAC) 300 MG tablet, Take 300 mg by mouth nightly. , Disp: , Rfl:     tizanidine (ZANAFLEX) 4 MG tablet, Take 1 tablet (4 mg total) by mouth nightly as needed., Disp: 30 tablet, Rfl: 3    triamcinolone acetonide 0.1% (KENALOG) 0.1 % cream, Apply topically 3 (three) times daily., Disp: 28.4 g, Rfl: 0    Physical Exam    BP: Vital signs stable  General: No apparent distress  HEENT: nuclear sclerotic cataract  Lungs: adequate respirations  Heart: + pulses  Abdomen: soft  Rectal/pelvic: deferred    Impression: Visually significant Cataract    Plan: Phacoemulsification with implantation of Intraocular lens    "

## 2018-07-19 ENCOUNTER — OFFICE VISIT (OUTPATIENT)
Dept: INTERNAL MEDICINE | Facility: CLINIC | Age: 78
End: 2018-07-19
Attending: FAMILY MEDICINE
Payer: MEDICARE

## 2018-07-19 VITALS
WEIGHT: 227.31 LBS | HEIGHT: 68 IN | OXYGEN SATURATION: 96 % | HEART RATE: 78 BPM | SYSTOLIC BLOOD PRESSURE: 128 MMHG | BODY MASS INDEX: 34.45 KG/M2 | DIASTOLIC BLOOD PRESSURE: 82 MMHG

## 2018-07-19 DIAGNOSIS — N18.30 TYPE 2 DIABETES MELLITUS WITH STAGE 3 CHRONIC KIDNEY DISEASE, WITH LONG-TERM CURRENT USE OF INSULIN: ICD-10-CM

## 2018-07-19 DIAGNOSIS — J06.9 VIRAL UPPER RESPIRATORY TRACT INFECTION: Primary | ICD-10-CM

## 2018-07-19 DIAGNOSIS — Z79.4 TYPE 2 DIABETES MELLITUS WITH STAGE 3 CHRONIC KIDNEY DISEASE, WITH LONG-TERM CURRENT USE OF INSULIN: ICD-10-CM

## 2018-07-19 DIAGNOSIS — I25.119 CORONARY ARTERY DISEASE INVOLVING NATIVE CORONARY ARTERY OF NATIVE HEART WITH ANGINA PECTORIS: ICD-10-CM

## 2018-07-19 DIAGNOSIS — I10 ESSENTIAL HYPERTENSION: ICD-10-CM

## 2018-07-19 DIAGNOSIS — G47.33 OSA ON CPAP: ICD-10-CM

## 2018-07-19 DIAGNOSIS — E11.22 TYPE 2 DIABETES MELLITUS WITH STAGE 3 CHRONIC KIDNEY DISEASE, WITH LONG-TERM CURRENT USE OF INSULIN: ICD-10-CM

## 2018-07-19 PROCEDURE — 99214 OFFICE O/P EST MOD 30 MIN: CPT | Mod: S$GLB,,, | Performed by: FAMILY MEDICINE

## 2018-07-19 PROCEDURE — 99999 PR PBB SHADOW E&M-EST. PATIENT-LVL III: CPT | Mod: PBBFAC,,, | Performed by: FAMILY MEDICINE

## 2018-07-19 PROCEDURE — 3074F SYST BP LT 130 MM HG: CPT | Mod: CPTII,S$GLB,, | Performed by: FAMILY MEDICINE

## 2018-07-19 PROCEDURE — 3079F DIAST BP 80-89 MM HG: CPT | Mod: CPTII,S$GLB,, | Performed by: FAMILY MEDICINE

## 2018-07-19 RX ORDER — CODEINE PHOSPHATE AND GUAIFENESIN 10; 100 MG/5ML; MG/5ML
5 SOLUTION ORAL 3 TIMES DAILY PRN
Qty: 180 ML | Refills: 0 | Status: SHIPPED | OUTPATIENT
Start: 2018-07-19 | End: 2018-07-29

## 2018-07-20 ENCOUNTER — TELEPHONE (OUTPATIENT)
Dept: INTERNAL MEDICINE | Facility: CLINIC | Age: 78
End: 2018-07-20

## 2018-07-20 RX ORDER — OXYCODONE AND ACETAMINOPHEN 10; 325 MG/1; MG/1
1 TABLET ORAL
Qty: 150 TABLET | Refills: 0 | Status: SHIPPED | OUTPATIENT
Start: 2018-07-20 | End: 2018-09-19

## 2018-07-20 NOTE — TELEPHONE ENCOUNTER
----- Message from Amina Swartz sent at 7/20/2018 12:47 PM CDT -----  Contact: self/self/[phone]  Patient called in regards needing to talk with Dr Arriaga about medication oxyCODONE-acetaminophen (PERCOCET)  mg per tablet. Patient stated that  if she cant get that rx if she can be prescribe something else. Please call and advise. Thank you

## 2018-07-20 NOTE — TELEPHONE ENCOUNTER
"----- Message from Amina Swartz sent at 7/20/2018  9:04 AM CDT -----  Contact: self/[phone]  RX request - refill or new RX.  Is this a refill or new RX:  refill  RX name and strength: oxyCODONE-acetaminophen (PERCOCET)  mg per tablet  Directions: Take 1 tablet by mouth 5 (five) times daily. - Oral  Is this a 30 day or 90 day RX:  150  Local pharmacy or mail order pharmacy:    Pharmacy name and phone # (DON'T enter "on file" or "in chart"):[facility] [phone] (Phone)  [phone] (Fax)   Comments:  Thank you        "

## 2018-07-26 ENCOUNTER — TELEPHONE (OUTPATIENT)
Dept: PAIN MEDICINE | Facility: CLINIC | Age: 78
End: 2018-07-26

## 2018-07-26 ENCOUNTER — TELEPHONE (OUTPATIENT)
Dept: INTERNAL MEDICINE | Facility: CLINIC | Age: 78
End: 2018-07-26

## 2018-07-26 NOTE — TELEPHONE ENCOUNTER
----- Message from Mónica Valle RN sent at 7/26/2018  2:33 PM CDT -----  Patient given pre-op call for Monday.  She reports she fell and both knees very swollen; she is cancelling procedure for Monday and will back when she can get around better.

## 2018-07-26 NOTE — TELEPHONE ENCOUNTER
----- Message from Brooke Tripp sent at 7/26/2018 10:10 AM CDT -----  Contact: self/[phone]      ----- Message -----  From: Osmani Soria  Sent: 7/26/2018  10:04 AM  To: Bart WILHELM Staff    Pt is calling to speak with someone in the office in regards to a cold that she still has she would like to speak with someone on what she should do. Please advise.        Thanks

## 2018-07-30 ENCOUNTER — TELEPHONE (OUTPATIENT)
Dept: INTERNAL MEDICINE | Facility: CLINIC | Age: 78
End: 2018-07-30

## 2018-07-30 NOTE — TELEPHONE ENCOUNTER
Spoke with pt advised, her that pcp didn't receive a message on last Thursday, offered pt an urgent care appointment today, she refused states that she will wait on pcp

## 2018-07-30 NOTE — TELEPHONE ENCOUNTER
----- Message from Jamilah Cobos sent at 7/30/2018  9:16 AM CDT -----  Contact: Patient [phone]  Patient would like to get medical advice.    Symptoms (please be specific):  Shoulder, knee, and side hurts from a fall out of the bed Friday    How long has patient had these symptoms:  4 days    Pharmacy name and phone # [facility] [phone] (Phone)  [phone] (Fax)    Comments:  She has been taking the oxyCODONE-acetaminophen (PERCOCET)  mg per tablet and is still hurting.

## 2018-08-01 ENCOUNTER — HOSPITAL ENCOUNTER (OUTPATIENT)
Dept: RADIOLOGY | Facility: HOSPITAL | Age: 78
Discharge: HOME | End: 2018-08-01
Attending: INTERNAL MEDICINE
Payer: MEDICARE

## 2018-08-01 ENCOUNTER — OFFICE VISIT (OUTPATIENT)
Dept: INTERNAL MEDICINE | Facility: CLINIC | Age: 78
End: 2018-08-01
Payer: MEDICARE

## 2018-08-01 VITALS
HEART RATE: 69 BPM | WEIGHT: 228.88 LBS | BODY MASS INDEX: 34.69 KG/M2 | DIASTOLIC BLOOD PRESSURE: 80 MMHG | OXYGEN SATURATION: 98 % | HEIGHT: 68 IN | SYSTOLIC BLOOD PRESSURE: 128 MMHG

## 2018-08-01 DIAGNOSIS — E13.9 DIABETES MELLITUS DUE TO ABNORMAL INSULIN: Primary | ICD-10-CM

## 2018-08-01 DIAGNOSIS — R05.9 COUGH: ICD-10-CM

## 2018-08-01 DIAGNOSIS — I10 ESSENTIAL HYPERTENSION: Chronic | ICD-10-CM

## 2018-08-01 DIAGNOSIS — M62.838 MUSCLE SPASMS OF NECK: ICD-10-CM

## 2018-08-01 DIAGNOSIS — J45.909 ASTHMA, CURRENTLY ACTIVE: ICD-10-CM

## 2018-08-01 DIAGNOSIS — E78.5 HYPERLIPIDEMIA, UNSPECIFIED HYPERLIPIDEMIA TYPE: ICD-10-CM

## 2018-08-01 PROCEDURE — 99214 OFFICE O/P EST MOD 30 MIN: CPT | Mod: S$GLB,,, | Performed by: INTERNAL MEDICINE

## 2018-08-01 PROCEDURE — 99999 PR PBB SHADOW E&M-EST. PATIENT-LVL III: CPT | Mod: PBBFAC,,, | Performed by: INTERNAL MEDICINE

## 2018-08-01 PROCEDURE — 71046 X-RAY EXAM CHEST 2 VIEWS: CPT | Mod: TC

## 2018-08-01 PROCEDURE — 3074F SYST BP LT 130 MM HG: CPT | Mod: CPTII,S$GLB,, | Performed by: INTERNAL MEDICINE

## 2018-08-01 PROCEDURE — 71046 X-RAY EXAM CHEST 2 VIEWS: CPT | Mod: 26,,, | Performed by: RADIOLOGY

## 2018-08-01 PROCEDURE — 3079F DIAST BP 80-89 MM HG: CPT | Mod: CPTII,S$GLB,, | Performed by: INTERNAL MEDICINE

## 2018-08-01 RX ORDER — AMOXICILLIN AND CLAVULANATE POTASSIUM 875; 125 MG/1; MG/1
1 TABLET, FILM COATED ORAL 2 TIMES DAILY
Qty: 20 TABLET | Refills: 0 | Status: SHIPPED | OUTPATIENT
Start: 2018-08-01 | End: 2018-08-11

## 2018-08-01 RX ORDER — PREDNISONE 10 MG/1
TABLET ORAL
Qty: 18 TABLET | Refills: 0 | Status: SHIPPED | OUTPATIENT
Start: 2018-08-01 | End: 2018-08-15

## 2018-08-01 NOTE — PROGRESS NOTES
CHIEF COMPLAINT: Follow up of dizziness, back pain, hypertension, diabetes     HISTORY OF PRESENT ILLNESS: This is a 78-year-old woman who presents for follow up of above.    She rolled out of bed 2 weeks ago on her right side on the floor. She did not hit her head. No loss of consciusness. SHe likes to sleep on the edge of the bed.  She has had right neck and shoulder pain and right hip and rib pain.  Pain is improving but she is still sore.  Pain is usually 4/10 but can be severe at times if she moves a certain way. She is taking oxycododne diana 10/325 4-5 times daily with minimal relief.     Dizziness is better. She is taking valium 5 mg once in the afternoon which controls her dizziness.     She has had a cough with some mild wheezing. She feels that her asthmas is flared. She is bring up green mucous.       Heartburn is controlled. She is taking pantoprazole 40 mg twice daily and ranitidine 300 mg at night.  She is taking dicyclomine 10 mg three times daily as needed which helps somewhat. Symptoms occur 1-2 times a month.  No nausea, vomiting, constipation, diarrhea. HEr bowels move regularly with metamucil.      She continues to have low back pain.. She saw Dr Le 6/5/18 and had MRI lumbar spine 6/12/18 - Moderate degenerative change at L4-5.  Dr Le recommended epidural - she is not sure if she wants to do the epidural.  She is no longer  gabapentin 100 mg 1 capsule three times daily and 3 at bedtime. She did not like how the gabapentin made her feel.  She takes oxycodone apap 10/325 one tablet 3-5 times daily depending on her pain. SHe takes Cymbalta 60 mg daily which helps her pain and her mood.        She continues to take aspirin 81 mg daily, coreg 25 mg twice daily and lasix 40 mg twice daily for her diastolic dysfunction and hypertension. She denies lightheadedness. No chest pain or shortness of breath. She is now taking atorvastatin 40 mg once daily for her cholesterol.      She is taking lantus  "60 units at night and Humalog 23 units with meals. Blood sugars have bene up and down.   No hypoglycemia.       PAST MEDICAL HISTORY:   1. Diabetes mellitus   2. Hypertension   3. Hyperlipidemia   4. Left heart catheterization January 2007 which revealed luminal irregularities in the LAD, left circumflex and right coronary artery. She had diastolic dysfunction and patent renal arteries.   5. Sleep apnea   6. Obesity.   7. Nephrolithiasis status post lithotripsy.   8. Reflux - had nonerosive gastropathy on EGD September 2007. Gastritis on EGD 9/2010  9. Hidradenitis suppurativa.   10. History of diverticulitis with a hospitalized October 2007.   11. Migraines   12. Obesity.   13. 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    14. Total hysterectomy in 1969.   15. Cholecystectomy was done at that time as well.   16. 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   17. Colon polyps on colonscopy 11/2010 - due 2013   18. Hospitalization 12/10 for e coli sepsis due to left ureteral stone with left nephrostomy tube in [place], [place]   19. Left knee replacement 9/2012      MEDICATIONS and ALLERGIES: Updated on EPIC.     PHYSICAL EXAMINATION:   /80 (BP Location: Right arm, Patient Position: Sitting, BP Method: Medium (Manual))   Pulse 69   Ht 5' 8" (1.727 m)   Wt 103.8 kg (228 lb 14.4 oz)   LMP  (LMP Unknown)   SpO2 98%   BMI 34.80 kg/m²         GENERAL: She is alert, oriented, no apparent distress. Affect within   normal limits.  Conjunctiva anicteric. . Oropharynx clear.   NECK: Supple.   Respiratory: Effort normal. Lungs clear  HEART: Regular rate and rhythm without murmurs, gallops or rubs.   No lower extremity edema.    Tenderness and spasm of the right trapezius muscle. Full range of motion of the right shoulder.  No tenderness over the right ribs.  Gait normal.      ASSESSMENT AND PLAN:   1. Right neck and shoulder pain - likely spasm. " Take diazepam 5 mg twice daily as an antispasmodic. Prednisone taper  2. Asthma exacerbation with bronchitis - augmentin 875 mg twice daily for 10 days. Prednsione taper.    3. Dizziness - likely benign positional vertigo - better on valium  3. HTN -  monitor BP at home  3. Gerd - stable pantoprazole to 40 mg daily and  ranitidine 300 mg nightly  4. Diabetes with neuropathy- labs today. Watch sugars on prednisone  5. Hyperlipidemia - on lipitor  6. Allergic rhinitis - stable  7. Diastolic congestive heart failure - stable.    8. CRI -stable  9. Obesity - discussed diet, exercise and weight loss  10. CAD -risk factor modification  11. Aortic atherosclerosis and possible mesenteric artery stenosis- risk factor modification  12. Tortuous aorta - HTN controlled  13. Colon polyps - Colonoscopy in 8/2013 - 2 polyps. Flex sig 11/2013 - mild granular mucosa.  14. hyperparathryodisism -saw nephrology    Recommended colonoscopy in 8/2018. Attempted colonoscopy 11/2016 du to diarrhea which has resolved. She had poor prep with hyperplastic rectal biopsy.   MMG 1/18  Prevnar 10/15 and flu shot 11/16  I will see her back in 1 month , sooner if problems

## 2018-08-06 ENCOUNTER — TELEPHONE (OUTPATIENT)
Dept: INTERNAL MEDICINE | Facility: CLINIC | Age: 78
End: 2018-08-06

## 2018-08-06 NOTE — TELEPHONE ENCOUNTER
----- Message from Jeannine Arriaga MD sent at 8/6/2018  3:01 PM CDT -----  Please notify pt that her cxr is fine  SF

## 2018-08-14 ENCOUNTER — TELEPHONE (OUTPATIENT)
Dept: OPHTHALMOLOGY | Facility: CLINIC | Age: 78
End: 2018-08-14

## 2018-08-15 ENCOUNTER — OFFICE VISIT (OUTPATIENT)
Dept: INTERNAL MEDICINE | Facility: CLINIC | Age: 78
End: 2018-08-15
Payer: MEDICARE

## 2018-08-15 VITALS
SYSTOLIC BLOOD PRESSURE: 136 MMHG | OXYGEN SATURATION: 97 % | DIASTOLIC BLOOD PRESSURE: 80 MMHG | HEIGHT: 68 IN | WEIGHT: 227.31 LBS | HEART RATE: 86 BPM | BODY MASS INDEX: 34.45 KG/M2

## 2018-08-15 DIAGNOSIS — F39 MOOD DISORDER: ICD-10-CM

## 2018-08-15 DIAGNOSIS — I25.119 CORONARY ARTERY DISEASE INVOLVING NATIVE CORONARY ARTERY OF NATIVE HEART WITH ANGINA PECTORIS: ICD-10-CM

## 2018-08-15 DIAGNOSIS — R42 DIZZINESS: ICD-10-CM

## 2018-08-15 DIAGNOSIS — I10 ESSENTIAL HYPERTENSION: Chronic | ICD-10-CM

## 2018-08-15 DIAGNOSIS — M54.9 OTHER CHRONIC BACK PAIN: Primary | Chronic | ICD-10-CM

## 2018-08-15 DIAGNOSIS — E78.5 HYPERLIPIDEMIA, UNSPECIFIED HYPERLIPIDEMIA TYPE: ICD-10-CM

## 2018-08-15 DIAGNOSIS — G89.29 OTHER CHRONIC BACK PAIN: Primary | Chronic | ICD-10-CM

## 2018-08-15 PROCEDURE — 99999 PR PBB SHADOW E&M-EST. PATIENT-LVL III: CPT | Mod: PBBFAC,,, | Performed by: INTERNAL MEDICINE

## 2018-08-15 PROCEDURE — 99499 UNLISTED E&M SERVICE: CPT | Mod: HCNC,S$GLB,, | Performed by: INTERNAL MEDICINE

## 2018-08-15 PROCEDURE — 3075F SYST BP GE 130 - 139MM HG: CPT | Mod: CPTII,S$GLB,, | Performed by: INTERNAL MEDICINE

## 2018-08-15 PROCEDURE — 99214 OFFICE O/P EST MOD 30 MIN: CPT | Mod: S$GLB,,, | Performed by: INTERNAL MEDICINE

## 2018-08-15 PROCEDURE — 3079F DIAST BP 80-89 MM HG: CPT | Mod: CPTII,S$GLB,, | Performed by: INTERNAL MEDICINE

## 2018-08-15 RX ORDER — BUPROPION HYDROCHLORIDE 150 MG/1
150 TABLET ORAL DAILY
Qty: 30 TABLET | Refills: 3 | Status: SHIPPED | OUTPATIENT
Start: 2018-08-15 | End: 2018-10-18

## 2018-08-15 NOTE — PROGRESS NOTES
CHIEF COMPLAINT: Follow up of dizziness, back pain, hypertension, diabetes     HISTORY OF PRESENT ILLNESS: This is a 78-year-old woman who presents for follow up of above.    Since our last visit, she took a prednisone taper for the right shoulder and neck pain which helped a little.  Right shoulder and back are a little better. She is taking oxycododne diana 10/325 4-5 times daily with minimal relief.     She states that since she fell, her thinking has been off.  She had identity theft several months ago and has not gotten her money back. She has been stressed out because she cannot pay her bills.  She has had some difficulty sleeping at night. Appetite has been down due to stress.    Dizziness is better. She takes valium 5 mg once a week.       She has been breathing well. Dry cough. No m ucous production.    Heartburn is controlled. She is taking pantoprazole 40 mg twice daily and ranitidine 300 mg at night.  She is taking dicyclomine 10 mg three times daily as needed which helps somewhat. Symptoms occur 1-2 times a month.  No nausea, vomiting, constipation, diarrhea. HEr bowels move regularly with metamucil.      She continues to have low back pain.. She saw Dr Le 6/5/18 and had MRI lumbar spine 6/12/18 - Moderate degenerative change at L4-5.  Dr Le recommended epidural - she is not sure if she wants to do the epidural.  She is no longer  gabapentin 100 mg 1 capsule three times daily and 3 at bedtime. She did not like how the gabapentin made her feel.  She takes oxycodone apap 10/325 one tablet 3-5 times daily depending on her pain. SHe takes Cymbalta 60 mg daily which helps her pain and her mood.        She continues to take aspirin 81 mg daily, coreg 25 mg twice daily and lasix 40 mg twice daily for her diastolic dysfunction and hypertension. She denies lightheadedness. No chest pain or shortness of breath. She is not taking atorvastatin 40 mg once daily for her cholesterol.      She is taking lantus  "60 units at night and Humalog 23 units with meals - she has not been eating much and has only taken the humalog once daily.  Blood sugars have bene up and down.   No hypoglycemia.       PAST MEDICAL HISTORY:   1. Diabetes mellitus   2. Hypertension   3. Hyperlipidemia   4. Left heart catheterization January 2007 which revealed luminal irregularities in the LAD, left circumflex and right coronary artery. She had diastolic dysfunction and patent renal arteries.   5. Sleep apnea   6. Obesity.   7. Nephrolithiasis status post lithotripsy.   8. Reflux - had nonerosive gastropathy on EGD September 2007. Gastritis on EGD 9/2010  9. Hidradenitis suppurativa.   10. History of diverticulitis with a hospitalized October 2007.   11. Migraines   12. Obesity.   13. 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    14. Total hysterectomy in 1969.   15. Cholecystectomy was done at that time as well.   16. 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   17. Colon polyps on colonscopy 11/2010 - due 2013   18. Hospitalization 12/10 for e coli sepsis due to left ureteral stone with left nephrostomy tube in [place], [place]   19. Left knee replacement 9/2012      MEDICATIONS and ALLERGIES: Updated on EPIC.     PHYSICAL EXAMINATION:       /80 (BP Location: Right arm, Patient Position: Sitting, BP Method: Medium (Manual))   Pulse 86   Ht 5' 8" (1.727 m)   Wt 103.1 kg (227 lb 4.8 oz)   LMP  (LMP Unknown)   SpO2 97%   BMI 34.56 kg/m²      GENERAL: She is alert, oriented, no apparent distress. Affect within   normal limits.  Conjunctiva anicteric. . Oropharynx clear.   NECK: Supple.   Respiratory: Effort normal. Lungs clear  HEART: Regular rate and rhythm without murmurs, gallops or rubs.   No lower extremity edema.  .  Gait normal.      ASSESSMENT AND PLAN:   1. Fall - ct head  2. Mood disorder - continue cymbalta.  Add wellbutrin  mg in am  3. Right neck and " shoulder pain - slowly better  4. Asthma - better   5. Dizziness - likely benign positional vertigo - asx currently  6. HTN -  monitor BP at home  7. Gerd - stable pantoprazole to 40 mg daily and  ranitidine 300 mg nightly  8. Diabetes with neuropathy- labs today. Watch sugars on prednisone  9. Hyperlipidemia - on lipitor  10. Allergic rhinitis - stable  11. Diastolic congestive heart failure - stable.    12. CRI -stable  13. Obesity - discussed diet, exercise and weight loss  14. CAD -risk factor modification  15. Aortic atherosclerosis and possible mesenteric artery stenosis- risk factor modification  16. Tortuous aorta - HTN controlled  17. Colon polyps - Colonoscopy in 8/2013 - 2 polyps. Flex sig 11/2013 - mild granular mucosa.  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 Attempted colonoscopy 11/2016 du to diarrhea which has resolved. She had poor prep with hyperplastic rectal biopsy.   MMG 1/18  Prevnar 10/15 and flu shot 11/16  I will see her back in 2 weeks with all prescription bottles.

## 2018-08-20 ENCOUNTER — TELEPHONE (OUTPATIENT)
Dept: OPTOMETRY | Facility: CLINIC | Age: 78
End: 2018-08-20

## 2018-08-20 ENCOUNTER — NURSE TRIAGE (OUTPATIENT)
Dept: ADMINISTRATIVE | Facility: CLINIC | Age: 78
End: 2018-08-20

## 2018-08-20 NOTE — H&P
History    Chief complaint:  Painless progressive vision loss    Present Ilness/Diagnosis: Nuclear sclerotic Cataract    Past Medical History:  has a past medical history of Abnormality of lung (11/08/2011), Anxiety, Arthritis, CAD (coronary artery disease), Calculus of ureter (2/22/2011), Cataract, CHF (congestive heart failure), Chronic back pain (7/29/2012), Colon polyp (9/2010; 11/2010), Colon polyps (7/29/2012), Coronary artery disease involving native coronary artery of native heart with angina pectoris, Depression, Diabetes mellitus, Diabetes mellitus type II, Diverticulitis large intestine (7/27/2015), Diverticulosis (09/25/2010; 11/02/2010; 11/08/2011; 7/29/2012), Duodenal disorder (08/25/2011), Duodenal ulcer, unspecified as acute or chronic, without hemorrhage, perforation, or obstruction (8/24/2011), E. 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), 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), Obesity, MARLON (obstructive sleep apnea), Pain, Peripheral neuropathy, Pneumonia, Polyneuropathy, Postherpetic neuralgia, Recurrent nephrolithiasis, S/P knee replacement (10/2/2012), S/P TKR (total knee replacement) (12/26/2012), Sensorineural hearing loss of both ears, Thyroid disease (11/08/2011), Trouble in sleeping, Type II or unspecified type diabetes mellitus with neurological manifestations, not stated as uncontrolled(250.60), Type II or unspecified type diabetes  "mellitus with peripheral circulatory disorders, not stated as uncontrolled(250.70), and Type II or unspecified type diabetes mellitus with renal manifestations, not stated as uncontrolled(250.40).    Family History/Social History: refer to chart    Allergies:   Review of patient's allergies indicates:   Allergen Reactions    Crestor [rosuvastatin]      Cramping in legs    Ezetimibe      Other reaction(s): abdominal pain, Diarrhea    Hydrocodone      Other reaction(s): Itching    Lisinopril      Other reaction(s): cough      Sulfa (sulfonamide antibiotics)      Itching and Rash      Sulfamethoxazole     Sulfamethoxazole-trimethoprim     Trimethoprim     Valsartan      Other reaction(s): Angioedema       Current Medications: No current facility-administered medications for this encounter.     Current Outpatient Medications:     ACCU-CHEK FASTCLIX Misc, , Disp: , Rfl:     ACCU-CHEK SMARTVIEW TEST STRIP Strp, TEST THREE TIMES DAILY, Disp: 300 strip, Rfl: 3    albuterol (PROVENTIL) 2.5 mg /3 mL (0.083 %) nebulizer solution, Take 3 mLs (2.5 mg total) by nebulization every 6 (six) hours as needed for Wheezing. Rescue, Disp: 90 mL, Rfl: 6    albuterol (VENTOLIN HFA) 90 mcg/actuation inhaler, Inhale 2 puffs by mouth into the lungs every 4 (four) hours as needed for Wheezing. Rescue, Disp: 18 g, Rfl: 1    ALCOHOL ANTISEPTIC PADS (ALCOHOL PREP PADS TOP), , Disp: , Rfl:     aspirin (ECOTRIN) 81 MG EC tablet, Take 1 tablet by mouth Daily., Disp: , Rfl:     atorvastatin (LIPITOR) 40 MG tablet, Take 1 tablet (40 mg total) by mouth once daily., Disp: 90 tablet, Rfl: 3    BD ULTRA-FINE OLU PEN NEEDLES 32 x 5/32 " Ndle, Uses 4 times daily, on multiple daily insulin injections, Disp: 130 each, Rfl: 12    buPROPion (WELLBUTRIN XL) 150 MG TB24 tablet, Take 1 tablet (150 mg total) by mouth once daily., Disp: 30 tablet, Rfl: 3    carvedilol (COREG) 25 MG tablet, TAKE 1 TABLET TWICE DAILY, Disp: 180 tablet, Rfl: 3    " cholecalciferol, vitamin D3, (VITAMIN D) 2,000 unit Cap, Take by mouth. 1 Capsule Oral Every morning, Disp: , Rfl:     diazePAM (VALIUM) 5 MG tablet, take half a tablet to one tablet by mouth twice daily for dizziness, Disp: 60 tablet, Rfl: 2    duloxetine (CYMBALTA) 60 MG capsule, Take 1 capsule (60 mg total) by mouth once daily., Disp: 30 capsule, Rfl: 11    fluticasone (FLONASE) 50 mcg/actuation nasal spray, SHAKE LQ AND U 2 SPRAYS IEN QD, Disp: 1 Bottle, Rfl: 6    furosemide (LASIX) 40 MG tablet, TAKE 1 TABLET twice daily, Disp: 270 tablet, Rfl: 3    insulin aspart (NOVOLOG) 100 unit/mL InPn pen, 23 units with meals plus correction scale. Max daily dose=120 units (Patient taking differently: Inject 23 Units into the skin 3 (three) times daily with meals. 23 units with meals plus correction scale. Max daily dose=120 units), Disp: 8 Box, Rfl: 3    insulin glargine (LANTUS SOLOSTAR) 100 unit/mL (3 mL) InPn pen, Inject 60 Units into the skin every evening., Disp: 4 Box, Rfl: 3    Lactobacillus acidophilus (PROBIOTIC) 10 billion cell Cap, Take 1 capsule by mouth once daily., Disp: , Rfl:     loratadine (CLARITIN) 10 mg tablet, TAKE 1 TABLET EVERY DAY, Disp: 90 tablet, Rfl: 4    MUSCLE RUB, WITH CAMPHOR, 4-30-10 % Crea, , Disp: , Rfl:     nitroGLYCERIN (NITROSTAT) 0.4 MG SL tablet, Place 1 tablet (0.4 mg total) under the tongue every 5 (five) minutes as needed., Disp: 25 tablet, Rfl: 2    ondansetron (ZOFRAN) 4 MG tablet, Take 1 tablet (4 mg total) by mouth every 8 (eight) hours as needed for Nausea., Disp: 30 tablet, Rfl: 3    oxyCODONE-acetaminophen (PERCOCET)  mg per tablet, Take 1 tablet by mouth 5 (five) times daily., Disp: 150 tablet, Rfl: 0    oxyCODONE-acetaminophen (PERCOCET)  mg per tablet, Take 1 tablet by mouth 5 (five) times daily., Disp: 150 tablet, Rfl: 0    pantoprazole (PROTONIX) 40 MG tablet, TAKE 1 TABLET(40 MG) BY MOUTH twice daily, Disp: 180 tablet, Rfl: 4    pen needle,  "diabetic (SURE-FINE PEN NEEDLES) 29 gauge x 1/2" Ndle, Use 4 times daily, Disp: 400 each, Rfl: 4    ranitidine (ZANTAC) 300 MG tablet, Take 300 mg by mouth nightly. , Disp: , Rfl:     tizanidine (ZANAFLEX) 4 MG tablet, Take 1 tablet (4 mg total) by mouth nightly as needed., Disp: 30 tablet, Rfl: 3    triamcinolone acetonide 0.1% (KENALOG) 0.1 % cream, Apply topically 3 (three) times daily., Disp: 28.4 g, Rfl: 0    Physical Exam    BP: Vital signs stable  General: No apparent distress  HEENT: nuclear sclerotic cataract  Lungs: adequate respirations  Heart: + pulses  Abdomen: soft  Rectal/pelvic: deferred    Impression: Visually significant Cataract    Plan: Phacoemulsification with implantation of Intraocular lens    "

## 2018-08-20 NOTE — TELEPHONE ENCOUNTER
Returned patient call in regard to started pre op drops. Unable to leave a message because mail box is full. Please advise patient to clear mail or provide another call back number.

## 2018-08-20 NOTE — TELEPHONE ENCOUNTER
Reason for Disposition   Nursing judgment    Protocols used: ST NO PROTOCOL CALL: SICK ADULT-A-[place]    Ms. Villasenor states she experienced nausea, vomiting, diarrhea, and dizziness today. She states she recently began taking Wellbutrin. Patient is requesting to speak to Dr. Arriaga.

## 2018-08-21 ENCOUNTER — TELEPHONE (OUTPATIENT)
Dept: INTERNAL MEDICINE | Facility: CLINIC | Age: 78
End: 2018-08-21

## 2018-08-21 ENCOUNTER — TELEPHONE (OUTPATIENT)
Dept: OPHTHALMOLOGY | Facility: CLINIC | Age: 78
End: 2018-08-21

## 2018-08-21 NOTE — TELEPHONE ENCOUNTER
----- Message from Brooke Tripp sent at 8/21/2018  9:22 AM CDT -----  Contact: Patient [phone]      ----- Message -----  From: Jamilah Cobos  Sent: 8/21/2018   9:16 AM  To: Bart WILHELM Staff    She said she is not taking the buPROPion (WELLBUTRIN XL) 150 MG TB24 tablet anymore because it made her throw up and have diarrhea.     Please call and advise

## 2018-08-21 NOTE — TELEPHONE ENCOUNTER
Spoke with pt, states she took her first dose of buPROPion (WELLBUTRIN XL) 150 MG TB24 tablet on Saturday. Woke up Sunday Morning with nausea, vomiting, and diarrhea. That was the only time she took the RX. Today still having the nausea but it is improving from yesterday and Sunday. Pt states there is nothing else that could have caused these symptoms besides the medication and would like an alternative called in.      Please advise

## 2018-08-21 NOTE — TELEPHONE ENCOUNTER
Spoke with the patient and gave surgery arrival time 10:30am, also do not eat anything after 9:00pm the night before surgery. Patient may have water, gatorade, or powerade from 9:00pm til you leave your home the morning of surgery.

## 2018-08-22 ENCOUNTER — ANESTHESIA (OUTPATIENT)
Dept: SURGERY | Facility: OTHER | Age: 78
End: 2018-08-22
Payer: MEDICARE

## 2018-08-22 ENCOUNTER — ANESTHESIA EVENT (OUTPATIENT)
Dept: SURGERY | Facility: OTHER | Age: 78
End: 2018-08-22
Payer: MEDICARE

## 2018-08-22 ENCOUNTER — TELEPHONE (OUTPATIENT)
Dept: OPHTHALMOLOGY | Facility: CLINIC | Age: 78
End: 2018-08-22

## 2018-08-22 NOTE — DISCHARGE INSTRUCTIONS
Home Care Instructions for Eye Surgeries    1. ACTIVITY:   Limit your activity today. Increase activity gradually. You may return to work or school as directed by your physician.    2. DIET:   Drink plenty of fluids. Resume your normal diet unless instructed otherwise.    3. PAIN:   Expect a moderate amount of pain. If a prescription for pain is not sent home with you, you may take your commonly used pain reliever as directed. If this is not sufficient, call your physician. You may resume any other prescription medication unless otherwise directed by your physician.     4. DRESSING:   Keep your dressing dry and intact.    5. COMMENTS:   No driving, operating heavy machinery or signing legal documents for 24 hours.    No bending forward at the waist.   See attached schedule of eye drops.    Discuss any problem with your physician as soon as it arises. Do not Delay.      EMERGENCY- If you are unable to contact your physician, please go to the nearest Emergency Room.       Anesthesia: Monitored Anesthesia Care (MAC)    Anesthesia Safety  · Have an adult family member or friend drive you home after the procedure.  · For the first 24 hours after your surgery:  ¨ Do not drive or use heavy equipment.  ¨ Do not make important decisions or sign documents.  ¨ Avoid alcohol.  ¨ Have someone stay with you, if possible. They can watch for problems and help keep you safe.    PLEASE FOLLOW ANY OTHER INSTRUCTIONS PROVIDED TO YOU BY DR. ZAFAR!

## 2018-08-22 NOTE — TELEPHONE ENCOUNTER
----- Message from Jailyn Durham MA sent at 8/22/2018 10:19 AM CDT -----  Contact: Henrietta      ----- Message -----  From: Tabby Velázquez  Sent: 8/22/2018  10:11 AM  To: Leonard MENCHACA Staff    Pt calling to post pone and reschedule surgery. She states that she has car trouble and she has to be towed.  She cannot make it this morning.  Please contact her at [phone]  Surgery rescheduled to 10/31/18. [facility]

## 2018-08-23 ENCOUNTER — HOSPITAL ENCOUNTER (OUTPATIENT)
Dept: RADIOLOGY | Facility: HOSPITAL | Age: 78
Discharge: HOME | End: 2018-08-23
Attending: INTERNAL MEDICINE
Payer: MEDICARE

## 2018-08-23 DIAGNOSIS — R42 DIZZINESS: ICD-10-CM

## 2018-08-23 PROCEDURE — 70450 CT HEAD/BRAIN W/O DYE: CPT | Mod: 26,,, | Performed by: RADIOLOGY

## 2018-08-23 PROCEDURE — 70450 CT HEAD/BRAIN W/O DYE: CPT | Mod: TC

## 2018-08-23 RX ORDER — OXYCODONE AND ACETAMINOPHEN 10; 325 MG/1; MG/1
1 TABLET ORAL
Qty: 150 TABLET | Refills: 0 | Status: SHIPPED | OUTPATIENT
Start: 2018-08-23 | End: 2018-09-19

## 2018-08-23 NOTE — TELEPHONE ENCOUNTER
Pt advised, she states that she was expecting pcp to give her something for pain, however nothing was given at the visit.

## 2018-08-23 NOTE — TELEPHONE ENCOUNTER
----- Message from Jeannine Arriaga MD sent at 8/23/2018  3:07 PM CDT -----  Please notify pt that CT scan of head is fine  SF

## 2018-08-28 NOTE — PROGRESS NOTES
CHIEF COMPLAINT: Patient presents with several days of flulike symptoms    HISTORY OF PRESENT ILLNESS: The patient presents with several days of fevers, chills, dry cough, and myalgias.  The patient denies wheezing, vomiting, constipation, or diarrhea.  Patient has no sick contact.  The patient did not receive a flu shot this year.  She has significantly decreased appetite.    The patient has diabetes.  Recent blood sugars have been less than 150.  There have been no episodes of hypoglycemia.  Patient does routinely monitor their blood sugar.    The patient has a history of stable hypertension on current medications.  Patient denies chest pain or shortness of breath today.    The patient has a history of stable hyperlipidemia on current medications.  The patient denies chest pain or shortness of breath today.  The patient denies muscle aches or myalgias suggestive of myositis.    REVIEW OF SYSTEMS:  GENERAL: No fatigability or weight loss.  SKIN: No rashes, itching or changes in color or texture of skin.  HEAD: No headaches or recent head trauma.  EYES: Visual acuity fine. No photophobia, ocular pain or diplopia.  EARS: Denies ear pain, discharge or vertigo.  NOSE: No loss of smell, no epistaxis or postnasal drip.  MOUTH & THROAT: No hoarseness or change in voice. No excessive gum bleeding.  NODES: Denies swollen glands.  CHEST: Denies AQUINO, cyanosis, wheezing, and sputum production.  CARDIOVASCULAR: Denies chest pain, PND, orthopnea or reduced exercise tolerance.  ABDOMEN:  No weight loss. Denies diarrhea, abdominal pain, hematemesis or blood in stool.  URINARY: No flank pain, dysuria or hematuria.  PERIPHERAL VASCULAR: No claudication or cyanosis.  MUSCULOSKELETAL: No joint stiffness or swelling. Denies back pain.  The patient does have some myalgias.  NEUROLOGIC: No history of seizures, paralysis, alteration of gait or coordination.    MEDICATIONS: The patient's MedCard has been reviewed and reconciled.  "    ALLERGIES: The patients' Allergy Card has been reviewed and reconciled.    PAST MEDICAL HISTORY: Unchanged since recent note    SOCIAL HISTORY: The patient does not smoke.  The patient consumes alcohol socially.      PHYSICAL EXAMINATION:   Blood pressure 128/82, pulse 78, height 5' 8" (1.727 m), weight 103.1 kg (227 lb 4.7 oz), SpO2 96 %.  APPEARANCE: Well nourished, well developed, in no acute distress.    HEAD: Normocephalic, atraumatic.  EYES: PERRL. EOMI.  Conjunctivae without injection and  anicteric  EARS: TM's intact. Light reflex normal. No retraction or perforation.    NOSE: Mucosa pink. Airway clear.  MOUTH & THROAT: No tonsillar enlargement. No pharyngeal erythema or exudate. No stridor.  NECK: Supple.   NODES: No cervical, axillary or inguinal lymph node enlargement.  CHEST: Lungs clear to auscultation.  No retractions are noted.  No rales or rhonchi are present.  CARDIOVASCULAR: Normal S1, S2. No rubs, murmurs or gallops.  ABDOMEN: Bowel sounds normal. Not distended. Soft. No tenderness or masses.  No ascites is noted.  MUSCULOSKELETAL:  There is no clubbing, cyanosis, or edema of the extremities x4.  There is full range of motion of the lumbar spine.  There is full range of motion of the extremities x4.  There is no deformity noted.    NEUROLOGIC:  Grossly intact  PSYCHIATRIC: Patient is alert and oriented x3.  Thought processes are all normal.  There is no homicidality.  There is no suicidality.  There is no evidence of psychosis.    LABORATORY/RADIOLOGY: Chart reviewed.    ASSESSMENT:   Viral syndrome  Type 2 diabetes on insulin  Hypertension  Hyperlipidemia  MARLON on CPAP    PLAN:  Zofran and albuterol inhaler  Robitussin AC  Increase fluids  Follow up with PCP  She will go to the emergency room if she develops hyperglycemia    "

## 2018-09-04 NOTE — PROGRESS NOTES
Subjective:      Patient ID: Henrietta Villasenor is a 78 y.o. female.    Chief Complaint: Joint Pain    Ms Villasenor is a 77 yo female here for follow up of her CTS and her low back pain.  She was last seen by me on 6/5/2018 and she was having back pain and not going to the gym.  we repeated CT injections on 2/5/2018.  Previously we did bilateral Ct injections on 9/29/2016 which also helped.  We did an MRI of the lumbar spine and discussed JANEE, she decided she did not want to do injection.   Today, she is not feeling ok.  She feels like her ears are itching.  She has a cold and she feels like she hurts all over. She has been doing ok, except being sick.  She is having stomach pain with eating.  She is still having back pain, but not severe.  She has pain with housework and walking.  She feels like hands are doing ok, they were hurting a lot last visit but got better on own.  She does feel stiff.  She fell out of bed and her right side feels stiff.  She hit her right shoulder.  She was able to get up on her own.  The pain is 9/10 now.  She is not taking gabapentin.      MRI lumbar 6/2018  Alignment: Normal.    Vertebrae: Normal marrow signal. No fracture.    Discs: Normal height and signal.    Cord: Normal.  Conus terminates at superior border of L1    Degenerative findings:    T12-L1: Mild broad-based disc bulge without compressive sequelae.    L1-L2: Unremarkable.    L2-L3: Mild broad-based disc bulge with facet arthropathy.  No compressive sequelae.    L3-L4: Broad-based disc bulge and facet arthropathy.  There is mild bilateral neural foraminal narrowing.    L4-L5: Trace anterolisthesis of L4 on L5 with broad-based disc bulge and facet arthropathy.  This results in moderate bilateral neural foraminal narrowing.  Moderate canal stenosis.  There is also significant narrowing of the left greater than right lateral recesses.    L5-S1: Broad-based disc bulge without significant compressive sequelae.    Paraspinal muscles &  soft tissues: Paraspinal atrophy.  No concerning findings in the visualized abdomen.  Add    Impression      Moderate degenerative change at L4-5.  Milder degenerative changes elsewhere in the lumbar spine.    X-ray lumbar 6/2018  The lumbar vertebra are intact.  No compression fracture is identified.  Degenerative changes are again seen with small osteophytes at multiple levels.  L4-5 disc space is mildly narrowed.  Degenerative changes are also noted at lower facet joints.  There is associated grade 1 spondylolisthesis at L4-5 which is stable between flexion and extension at about 6 mm.  Alignment is otherwise satisfactory.    Visualized abdomen shows aortic atherosclerosis and surgical clips in the right upper quadrant.    Past Medical History:  11/08/2011: Abnormality of lung      Comment: Stable bandlike opacities at the lung bases,                most likely representing    No date: Anxiety  No date: Arthritis  No date: CAD (coronary artery disease)  2/22/2011: Calculus of ureter  No date: Cataract  No date: CHF (congestive heart failure)  7/29/2012: Chronic back pain  9/2010; 11/2010: Colon polyp  7/29/2012: Colon polyps  No date: Coronary artery disease involving native coron*  No date: Depression  No date: Diabetes mellitus  No date: Diabetes mellitus type II  7/27/2015: Diverticulitis large intestine  09/25/2010; 11/02/2010; 11/08/2011; 7/29/2012: Diverticulosis  08/25/2011: Duodenal disorder      Comment: Duodenal erosion noted on EGD.  8/24/2011: Duodenal ulcer, unspecified as acute or chroni*  12/2010: E. coli sepsis      Comment: Due to left ureteral stone with left                nephrostomy tube - hospitalized in [employer]  01/24/2012: Esophageal dysmotility      Comment: Noted on upper GI-barium swallow.  1969: Facial weakness      Comment: Left facial weakness s/p left mastoidectomy in               ~ 1969.  11/08/2011: Fatty liver      Comment: Reported on CT-abdomen and in 06/2012 Gastro                 clinic visit note.  09/29/2010: Gastric polyp  7/29/2012: GERD (gastroesophageal reflux disease)  11/08/2011: Hepatomegaly      Comment: Reported on CT-abdomen  2/28/2011: Herpes zoster with other nervous system compli*  06/26/2006; 09/29/2010; 08/25/2011: Hiatal hernia      Comment: Noted on barium swallow 2006; noted on  EGD                2011.  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  No date: Trouble in sleeping  No date: Type II or unspecified type diabetes mellitus *  No date: Type II or unspecified type diabetes mellitus *  No date: Type II or unspecified type diabetes mellitus *    Past Surgical History:  No date: BELPHAROPTOSIS REPAIR      Comment: s/p LAMBERTO levator repair - Dr. Dejesus  No date: CARDIAC CATHETERIZATION  3/13/2012: CARPAL TUNNEL RELEASE  No date: CHOLECYSTECTOMY  11/16/2016: COLONOSCOPY N/A      Comment: Procedure: COLONOSCOPY;  Surgeon: Crhis Storey MD;  Location: [address]);                 Service: Endoscopy;  Laterality: N/A;  6/14/2017: COLONOSCOPY N/A      Comment: Procedure: COLONOSCOPY;  Surgeon: Chris Storey MD;  Location: [facility] ([address] [place] [address]);                 Service: Endoscopy;  Laterality: N/A;                  colonoscopy in 3 months with better bowel prep.               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  No date: NEPHROSTOMY  No date: OOPHORECTOMY  9/13/2012: TOTAL KNEE ARTHROPLASTY      Comment: Left    Review of patient's family history indicates:    Sleep apnea                    Sister                    Cancer                         Sister                      Comment: breast    Diabetes                       Sister                    Breast cancer                  Sister                    Cancer                         Mother                      Comment: brain tumor    Hypertension                   Mother                    Heart disease                  Father                    Heart attack                   Father                    Dementia                       Brother                   Diabetes                       Brother                   Lupus                          Brother                   No Known Problems              Daughter                  No Known Problems              Son                       Diabetes                       Sister                    Lupus                          Sister                    Diabetes                       Sister                    Cancer                         Sister                    Kidney disease                 Sister                    Heart disease                  Sister                    Liver cancer                   Brother                   Cirrhosis                      Brother                   No Known Problems              Daughter                  No Known Problems              Son                       No Known Problems              Son                       Diabetes                       Other                     Ovarian cancer                 Other                        Comment: Niece     Breast cancer                  Maternal Aunt             Glaucoma                       Neg Hx                    Blindness                      Neg Hx                    Celiac disease                 Neg Hx                    Colon cancer                   Neg Hx                    Colon polyps                   Neg Hx                    Esophageal cancer              Neg Hx                    Inflammatory bowel disease     Neg Hx                    Liver disease                  Neg Hx                    Rectal cancer                  Neg Hx                    Stomach cancer                 Neg Hx                    Ulcerative colitis             Neg Hx                    Melanoma                       Neg Hx                    Multiple sclerosis             Neg Hx                    Psoriasis                      Neg Hx                      Social History    Marital status: Single              Spouse name:                       Years of education:                 Number of children: 5             Social History Main Topics    Smoking status: Former Smoker                                                                Packs/day: 1.00      Years: 15.00          Types: Cigarettes       Quit date: 7/24/2000    Smokeless tobacco: Never Used                        Alcohol use: No              Drug use: No              Sexual activity: No                   Social History Narrative    Single with 5 children. Lives alone. Retired [employer].        Current Outpatient Prescriptions:  ACCU-CHEK FASTCLIX Misc, , Disp: , Rfl:   ACCU-CHEK SMARTVIEW TEST STRIP Strp, TEST THREE TIMES DAILY, Disp: 300 strip, Rfl: 3  albuterol (PROVENTIL) 2.5 mg /3 mL (0.083 %) nebulizer solution, Take 3 mLs (2.5 mg total) by nebulization every 6 (six) hours as needed for Wheezing. Rescue, Disp: 90 mL, Rfl: 6  ALCOHOL ANTISEPTIC PADS (ALCOHOL PREP PADS TOP), , Disp: , Rfl:   aspirin (ECOTRIN) 81 MG EC tablet, Take 1  "tablet by mouth Daily., Disp: , Rfl:   atorvastatin (LIPITOR) 40 MG tablet, Take 1 tablet (40 mg total) by mouth once daily., Disp: 90 tablet, Rfl: 3  BD ULTRA-FINE OLU PEN NEEDLES 32 x 5/32 " Ndle, Uses 4 times daily, on multiple daily insulin injections, Disp: 130 each, Rfl: 12  carvedilol (COREG) 25 MG tablet, TAKE 1 TABLET TWICE DAILY, Disp: 180 tablet, Rfl: 3  cholecalciferol, vitamin D3, (VITAMIN D) 2,000 unit Cap, Take by mouth. 1 Capsule Oral Every morning, Disp: , Rfl:   diaper,brief,adult,disposable Misc, , Disp: , Rfl:   dicyclomine (BENTYL) 10 MG capsule, Take 1 capsule (10 mg total) by mouth 3 (three) times daily as needed (abdominal pain)., Disp: 60 capsule, Rfl: 3  duloxetine (CYMBALTA) 60 MG capsule, Take 1 capsule (60 mg total) by mouth once daily., Disp: 30 capsule, Rfl: 11  fluticasone (FLONASE) 50 mcg/actuation nasal spray, SHAKE LQ AND U 2 SPRAYS IEN QD, Disp: 1 Bottle, Rfl: 6  furosemide (LASIX) 40 MG tablet, TAKE 1 TABLET twice daily, Disp: 270 tablet, Rfl: 3  gabapentin (NEURONTIN) 100 MG capsule, TAKE 1 CAPSULE THREE TIMES DAILY  AND TAKE 3 CAPSULES AT BEDTIME, Disp: 540 capsule, Rfl: 2  insulin aspart (NOVOLOG) 100 unit/mL InPn pen, 23 units with meals plus correction scale. Max daily dose=120 units, Disp: 8 Box, Rfl: 3  insulin glargine (LANTUS SOLOSTAR) 100 unit/mL (3 mL) InPn pen, Inject 60 Units into the skin every evening., Disp: 4 Box, Rfl: 3  Lactobacillus acidophilus (PROBIOTIC) 10 billion cell Cap, Take 1 capsule by mouth once daily., Disp: , Rfl:   loratadine (CLARITIN) 10 mg tablet, Take 1 tablet (10 mg total) by mouth once daily., Disp: 90 tablet, Rfl: 4  MUSCLE RUB, WITH CAMPHOR, 4-30-10 % Crea, , Disp: , Rfl:   nitroGLYCERIN (NITROSTAT) 0.4 MG SL tablet, Place 1 tablet (0.4 mg total) under the tongue every 5 (five) minutes as needed., Disp: 25 tablet, Rfl: 2  nystatin (MYCOSTATIN) cream, Apply topically 2 (two) times daily., Disp: 120 g, Rfl: 3  ofloxacin (OCUFLOX) 0.3 % " "ophthalmic solution, Place 1 drop into the left eye 3 (three) times daily., Disp: 5 mL, Rfl: 1  ondansetron (ZOFRAN) 4 MG tablet, Take 1 tablet (4 mg total) by mouth every 8 (eight) hours as needed for Nausea., Disp: 30 tablet, Rfl: 3  (START ON 3/19/2018) oxyCODONE-acetaminophen (PERCOCET)  mg per tablet, Take 1 tablet by mouth 5 (five) times daily., Disp: 150 tablet, Rfl: 0  oxyCODONE-acetaminophen (PERCOCET)  mg per tablet, Take 1 tablet by mouth 5 (five) times daily., Disp: 150 tablet, Rfl: 0  pantoprazole (PROTONIX) 40 MG tablet, TAKE 1 TABLET(40 MG) BY MOUTH twice daily, Disp: 180 tablet, Rfl: 4  pen needle, diabetic (SURE-FINE PEN NEEDLES) 29 gauge x 1/2" Ndle, Use 4 times daily, Disp: 400 each, Rfl: 4  prednisoLONE acetate (PRED FORTE) 1 % DrpS, Place 1 drop into the left eye 3 (three) times daily., Disp: 5 mL, Rfl: 1  promethazine (PHENERGAN) 6.25 mg/5 mL syrup, Take 5 mLs (6.25 mg total) by mouth every 6 (six) hours as needed (cough)., Disp: 118 mL, Rfl: 0  tizanidine (ZANAFLEX) 4 MG tablet, Take 1 tablet (4 mg total) by mouth nightly as needed., Disp: 30 tablet, Rfl: 3  triamcinolone acetonide 0.1% (KENALOG) 0.1 % cream, Apply topically 3 (three) times daily., Disp: 28.4 g, Rfl: 0  VENTOLIN HFA 90 mcg/actuation inhaler, INHALE 2 PUFFS INTO THE LUNGS  EVERY 4 HOURS AS NEEDED FOR ASTHMA, Disp: 18 g, Rfl: 1    No current facility-administered medications for this visit.       Review of patient's allergies indicates:   -- Crestor (rosuvastatin)     --  Cramping in legs   -- Ezetimibe     --  Other reaction(s): abdominal pain, Diarrhea   -- Hydrocodone     --  Other reaction(s): Itching   -- Lisinopril     --  Other reaction(s): cough   -- Sulfa (sulfonamide antibiotics)     --  Itching and Rash   -- Sulfamethoxazole    -- Sulfamethoxazole-trimethoprim    -- Trimethoprim    -- Valsartan     --  Other reaction(s): Angioedema              Review of Systems   Constitution: Negative for weight gain and " weight loss.   HENT: Positive for sore throat.    Cardiovascular: Positive for dyspnea on exertion. Negative for chest pain.   Respiratory: Positive for cough and shortness of breath.    Musculoskeletal: Positive for back pain and myalgias. Negative for joint pain, joint swelling and neck pain.   Gastrointestinal: Positive for abdominal pain. Negative for bowel incontinence.   Genitourinary: Negative for bladder incontinence.   Neurological: Positive for dizziness and numbness (bilateral hands better after injection).         Objective:        General: Henrietta is well-developed, well-nourished, appears stated age, in no acute distress, alert and oriented to time, place and person.     General    Vitals reviewed.  Constitutional: She is oriented to person, place, and time. She appears well-developed and well-nourished.   HENT:   Head: Normocephalic and atraumatic.   Pulmonary/Chest: Effort normal.   Neurological: She is alert and oriented to person, place, and time.   Psychiatric: She has a normal mood and affect. Her behavior is normal. Judgment and thought content normal.     General Musculoskeletal Exam   Gait: normal     Back (L-Spine & T-Spine) / Neck (C-Spine) Exam     Tenderness Right paramedian tenderness of the Sacrum. Left paramedian tenderness of the Sacrum.     Back (L-Spine & T-Spine) Range of Motion   Extension: 10   Flexion: 70   Lateral bend right: 10   Lateral bend left: 10   Rotation right: 20   Rotation left: 20     Spinal Sensation   Right Side Sensation  C-Spine Level: normal   L-Spine Level: normal  S-Spine Level: normal  Left Side Sensation  C-Spine Level: normal  L-Spine Level: normal  S-Spine Level: normal    Other She has no scoliosis .  Spinal Kyphosis:  Absent      Right Hand/Wrist Exam     Tests   Phalens sign: positive  Tinel's sign (median nerve): positive    Atrophy   Thenar:  negative  Hypothenar:  negative      Left Hand/Wrist Exam     Tests   Phalens sign: positive  Tinel's sign  (median nerve): positive    Atrophy  Thenar:  Negative  Hypothenar:  negative          Muscle Strength   Right Upper Extremity   Biceps: 5/5/5   Deltoid:  5/5  Triceps:  5/5  Wrist extension: 5/5/5   : 5/5/5   Finger Flexors:  5/5  Left Upper Extremity  Biceps: 5/5/5   Deltoid:  5/5  Triceps:  5/5  Wrist extension: 5/5/5   :  5/5/5   Finger Flexors:  5/5  Right Lower Extremity   Hip Flexion: 5/5   Quadriceps:  5/5   Anterior tibial:  5/5/5  EHL:  5/5  Left Lower Extremity   Hip Flexion: 5/5   Quadriceps:  5/5   Anterior tibial:  5/5/5   EHL:  5/5    Reflexes     Left Side  Biceps:  2+  Triceps:  2+  Brachioradialis:  2+  Quadriceps:  2+  Achilles:  2+  Left Nichols's Sign:  Absent  Babinski Sign:  absent    Right Side   Biceps:  2+  Triceps:  2+  Brachioradialis:  2+  Quadriceps:  2+  Achilles:  2+  Right Nichols's Sign:  absent  Babinski Sign:  absent    Vascular Exam     Right Pulses        Carotid:                  2+    Left Pulses        Carotid:                  2+    Capillary Refill  Right Hand: normal capillary refill  Left Hand: normal capillary refill              Assessment:       1. Lumbar spondylosis    2. Spinal stenosis of lumbar region, unspecified whether neurogenic claudication present    3. DDD (degenerative disc disease), lumbar    4. Spinal enthesopathy    5. Muscle spasm    6. Chronic bilateral low back pain without sciatica    7. Bilateral carpal tunnel syndrome           Plan:       Orders Placed This Encounter    tiZANidine (ZANAFLEX) 4 MG tablet     1. Repeat Carpal tunnel injection on 2/2018  2.  hand splints at night  3.she has stopped gabapentin  4. Encourage her to go back to the gym and to restart exercises from therapy.  She feels better when moving, she does not feel motivated since identity theft.  She has been sick  5. Continue tizanidine  6.  Reviewed her MRI of the lumbar spine and discussed facet injections, she is not interested, but says she will think about it  7.   She has a cold, and does not feel good day.  8.   rtc 3 months      Follow-up: Follow-up in about 3 months (around 12/6/2018). If there are any questions prior to this, the patient was instructed to contact the office.

## 2018-09-06 ENCOUNTER — OFFICE VISIT (OUTPATIENT)
Dept: SPINE | Facility: CLINIC | Age: 78
End: 2018-09-06
Attending: PHYSICAL MEDICINE & REHABILITATION
Payer: MEDICARE

## 2018-09-06 ENCOUNTER — HOSPITAL ENCOUNTER (OUTPATIENT)
Dept: RADIOLOGY | Facility: HOSPITAL | Age: 78
Discharge: HOME | End: 2018-09-06
Attending: NURSE PRACTITIONER
Payer: MEDICARE

## 2018-09-06 ENCOUNTER — TELEPHONE (OUTPATIENT)
Dept: SPINE | Facility: CLINIC | Age: 78
End: 2018-09-06

## 2018-09-06 ENCOUNTER — OFFICE VISIT (OUTPATIENT)
Dept: INTERNAL MEDICINE | Facility: CLINIC | Age: 78
End: 2018-09-06
Payer: MEDICARE

## 2018-09-06 VITALS
DIASTOLIC BLOOD PRESSURE: 91 MMHG | SYSTOLIC BLOOD PRESSURE: 195 MMHG | WEIGHT: 226 LBS | HEIGHT: 68 IN | BODY MASS INDEX: 34.25 KG/M2 | HEART RATE: 76 BPM

## 2018-09-06 VITALS
TEMPERATURE: 99 F | OXYGEN SATURATION: 98 % | BODY MASS INDEX: 33.78 KG/M2 | DIASTOLIC BLOOD PRESSURE: 72 MMHG | WEIGHT: 222.88 LBS | HEIGHT: 68 IN | SYSTOLIC BLOOD PRESSURE: 118 MMHG | HEART RATE: 68 BPM

## 2018-09-06 DIAGNOSIS — M46.00 SPINAL ENTHESOPATHY: ICD-10-CM

## 2018-09-06 DIAGNOSIS — J44.1 COPD EXACERBATION: Primary | ICD-10-CM

## 2018-09-06 DIAGNOSIS — J45.909 ASTHMA, CURRENTLY ACTIVE: ICD-10-CM

## 2018-09-06 DIAGNOSIS — G89.29 CHRONIC BILATERAL LOW BACK PAIN WITHOUT SCIATICA: ICD-10-CM

## 2018-09-06 DIAGNOSIS — M54.50 CHRONIC BILATERAL LOW BACK PAIN WITHOUT SCIATICA: ICD-10-CM

## 2018-09-06 DIAGNOSIS — M48.061 SPINAL STENOSIS OF LUMBAR REGION, UNSPECIFIED WHETHER NEUROGENIC CLAUDICATION PRESENT: ICD-10-CM

## 2018-09-06 DIAGNOSIS — M62.838 MUSCLE SPASM: ICD-10-CM

## 2018-09-06 DIAGNOSIS — M47.816 LUMBAR SPONDYLOSIS: Primary | ICD-10-CM

## 2018-09-06 DIAGNOSIS — J44.1 COPD EXACERBATION: ICD-10-CM

## 2018-09-06 DIAGNOSIS — M51.36 DDD (DEGENERATIVE DISC DISEASE), LUMBAR: ICD-10-CM

## 2018-09-06 DIAGNOSIS — G56.03 BILATERAL CARPAL TUNNEL SYNDROME: ICD-10-CM

## 2018-09-06 PROCEDURE — 3078F DIAST BP <80 MM HG: CPT | Mod: CPTII,,, | Performed by: NURSE PRACTITIONER

## 2018-09-06 PROCEDURE — 99499 UNLISTED E&M SERVICE: CPT | Mod: HCNC,S$GLB,, | Performed by: NURSE PRACTITIONER

## 2018-09-06 PROCEDURE — 71046 X-RAY EXAM CHEST 2 VIEWS: CPT | Mod: TC

## 2018-09-06 PROCEDURE — 71046 X-RAY EXAM CHEST 2 VIEWS: CPT | Mod: 26,,, | Performed by: RADIOLOGY

## 2018-09-06 PROCEDURE — 99999 PR PBB SHADOW E&M-EST. PATIENT-LVL III: CPT | Mod: PBBFAC,,, | Performed by: PHYSICAL MEDICINE & REHABILITATION

## 2018-09-06 PROCEDURE — 3288F FALL RISK ASSESSMENT DOCD: CPT | Mod: CPTII,,, | Performed by: PHYSICAL MEDICINE & REHABILITATION

## 2018-09-06 PROCEDURE — 99213 OFFICE O/P EST LOW 20 MIN: CPT | Mod: PBBFAC,25 | Performed by: PHYSICAL MEDICINE & REHABILITATION

## 2018-09-06 PROCEDURE — 99999 PR PBB SHADOW E&M-EST. PATIENT-LVL IV: CPT | Mod: PBBFAC,,, | Performed by: NURSE PRACTITIONER

## 2018-09-06 PROCEDURE — 1101F PT FALLS ASSESS-DOCD LE1/YR: CPT | Mod: CPTII,,, | Performed by: NURSE PRACTITIONER

## 2018-09-06 PROCEDURE — 3077F SYST BP >= 140 MM HG: CPT | Mod: CPTII,,, | Performed by: PHYSICAL MEDICINE & REHABILITATION

## 2018-09-06 PROCEDURE — 99214 OFFICE O/P EST MOD 30 MIN: CPT | Mod: PBBFAC,25,27 | Performed by: NURSE PRACTITIONER

## 2018-09-06 PROCEDURE — 99214 OFFICE O/P EST MOD 30 MIN: CPT | Mod: S$PBB,,, | Performed by: PHYSICAL MEDICINE & REHABILITATION

## 2018-09-06 PROCEDURE — 99214 OFFICE O/P EST MOD 30 MIN: CPT | Mod: S$PBB,,, | Performed by: NURSE PRACTITIONER

## 2018-09-06 PROCEDURE — 3074F SYST BP LT 130 MM HG: CPT | Mod: CPTII,,, | Performed by: NURSE PRACTITIONER

## 2018-09-06 PROCEDURE — 3080F DIAST BP >= 90 MM HG: CPT | Mod: CPTII,,, | Performed by: PHYSICAL MEDICINE & REHABILITATION

## 2018-09-06 RX ORDER — ALBUTEROL SULFATE 0.83 MG/ML
2.5 SOLUTION RESPIRATORY (INHALATION) EVERY 6 HOURS PRN
Qty: 90 ML | Refills: 6 | Status: SHIPPED | OUTPATIENT
Start: 2018-09-06 | End: 2019-01-24

## 2018-09-06 RX ORDER — PREDNISONE 20 MG/1
TABLET ORAL
Qty: 10 TABLET | Refills: 0 | Status: SHIPPED | OUTPATIENT
Start: 2018-09-06 | End: 2018-09-11

## 2018-09-06 RX ORDER — DOXYCYCLINE HYCLATE 100 MG
100 TABLET ORAL EVERY 12 HOURS
Qty: 20 TABLET | Refills: 0 | Status: SHIPPED | OUTPATIENT
Start: 2018-09-06 | End: 2018-09-19

## 2018-09-06 RX ORDER — TIZANIDINE 4 MG/1
4 TABLET ORAL NIGHTLY PRN
Qty: 30 TABLET | Refills: 3 | Status: SHIPPED | OUTPATIENT
Start: 2018-09-06 | End: 2018-09-19

## 2018-09-06 NOTE — PROGRESS NOTES
Subjective:       Patient ID: Henrietta Villasenor is a 78 y.o. female.    Chief Complaint: Chills (green mucous. last saturday(cough syrup) ) and Tinnitus (right ear )    Pt here c/o cough, chest congestion and  sore throat.  Sx started Saturday.  Pt last used her nebulizer last night.  Cough worse at night        Review of Systems   Constitutional: Negative for activity change, appetite change and chills.   HENT: Positive for postnasal drip, rhinorrhea and sore throat. Negative for congestion, sinus pressure, sinus pain, sneezing, tinnitus, trouble swallowing and voice change.    Respiratory: Positive for cough and chest tightness.    Cardiovascular: Negative for chest pain.   Gastrointestinal: Negative for abdominal pain, constipation, diarrhea, nausea and vomiting.   Musculoskeletal: Negative for arthralgias, myalgias, neck pain and neck stiffness.   Skin: Negative for rash.   Neurological: Negative for dizziness and headaches.   Hematological: Negative for adenopathy.   Psychiatric/Behavioral: Negative for sleep disturbance.         Past Medical History:   Diagnosis Date    Abnormality of lung 11/08/2011    Stable bandlike opacities at the lung bases, most likely representing      Anxiety     Arthritis     CAD (coronary artery disease)     Calculus of ureter 2/22/2011    Cataract     CHF (congestive heart failure)     Chronic back pain 7/29/2012    Colon polyp 9/2010; 11/2010    Colon polyps 7/29/2012    Coronary artery disease involving native coronary artery of native heart with angina pectoris     Depression     Diabetes mellitus     Diabetes mellitus type II     Diverticulitis large intestine 7/27/2015    Diverticulosis 09/25/2010; 11/02/2010; 11/08/2011; 7/29/2012    Duodenal disorder 08/25/2011    Duodenal erosion noted on EGD.    Duodenal ulcer, unspecified as acute or chronic, without hemorrhage, perforation, or obstruction 8/24/2011    E. coli sepsis 12/2010    Due to left ureteral stone  with left nephrostomy tube - hospitalized in [place]    Esophageal dysmotility 01/24/2012    Noted on upper GI-barium swallow.    Facial weakness 1969    Left facial weakness s/p left mastoidectomy in ~ 1969.    Fatty liver 11/08/2011    Reported on CT-abdomen and in 06/2012 Gastro clinic visit note.    Gastric polyp 09/29/2010    GERD (gastroesophageal reflux disease) 7/29/2012    Hepatomegaly 11/08/2011    Reported on CT-abdomen    Herpes zoster with other nervous system complications(053.19) 2/28/2011    Hiatal hernia 06/26/2006; 09/29/2010; 08/25/2011    Noted on barium swallow 2006; noted on  EGD 2011.   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    Trouble in sleeping     Type II or unspecified type diabetes mellitus with neurological manifestations, not stated as uncontrolled(250.60)     Type II or unspecified type diabetes mellitus with peripheral circulatory disorders, not stated as uncontrolled(250.70)     Type II or unspecified type diabetes mellitus with renal manifestations, not stated as uncontrolled(250.40)      Past Surgical History:   Procedure Laterality Date    BELPHAROPTOSIS REPAIR      s/p LAMBERTO levator repair - Dr. Dejesus    CARDIAC CATHETERIZATION      CARPAL TUNNEL RELEASE   3/13/2012    CHOLECYSTECTOMY      EXTRACORPOREAL SHOCK WAVE LITHOTRIPSY      HYSTERECTOMY      mastoid tumor removal  1969    Left mastoidectomy with residual left facial weakness.    NEPHROSTOMY      OOPHORECTOMY      TOTAL KNEE ARTHROPLASTY  9/13/2012    Left     Social History     Social History Narrative    Single with 5 children. Lives alone. Retired cook.     Family History   Problem Relation Age of Onset    Sleep apnea Sister     Diabetes Sister     Cancer Mother         brain tumor    Hypertension Mother     Heart disease Father     Heart attack Father     Dementia Brother     Lupus Brother     Frontotemporal dementia Brother     No Known Problems Daughter     No Known Problems Son     Diabetes Sister     Lupus Sister     Cancer Sister         breast cancer    Diabetes Sister     Cancer Sister         breast cancer    Heart attack Sister     Diabetes Sister     Liver cancer Brother     Drug abuse Brother     Alcohol abuse Brother     Cirrhosis Brother     Drug abuse Brother     No Known Problems Daughter     No Known Problems Son     No Known Problems Son     No Known Problems Maternal Grandmother     No Known Problems Maternal Grandfather     No Known Problems Paternal Grandmother     No Known Problems Paternal Grandfather     Diabetes Brother     Dementia Brother     Diabetes Other     Ovarian cancer Other         Niece     Breast cancer Maternal Aunt     No Known Problems Maternal Uncle     No Known Problems Paternal Aunt     No Known Problems Paternal Uncle     Glaucoma Neg Hx     Blindness Neg Hx     Celiac disease Neg Hx     Colon cancer Neg Hx     Colon polyps Neg Hx     Esophageal cancer Neg Hx     Inflammatory bowel disease Neg Hx     Liver disease Neg Hx     Rectal cancer Neg Hx     Stomach cancer Neg Hx     Ulcerative colitis Neg Hx     Melanoma Neg Hx     Multiple sclerosis Neg Hx     Psoriasis Neg Hx     Amblyopia Neg Hx     Cataracts  "Neg Hx     Macular degeneration Neg Hx     Retinal detachment Neg Hx     Strabismus Neg Hx     Stroke Neg Hx     Thyroid disease Neg Hx      Outpatient Encounter Medications as of 9/6/2018   Medication Sig Dispense Refill    ACCU-CHEK FASTCLIX Misc       ACCU-CHEK SMARTVIEW TEST STRIP Strp TEST THREE TIMES DAILY 300 strip 3    albuterol (PROVENTIL) 2.5 mg /3 mL (0.083 %) nebulizer solution Take 3 mLs (2.5 mg total) by nebulization every 6 (six) hours as needed for Wheezing. Rescue 90 mL 6    albuterol (VENTOLIN HFA) 90 mcg/actuation inhaler Inhale 2 puffs by mouth into the lungs every 4 (four) hours as needed for Wheezing. Rescue 18 g 1    ALCOHOL ANTISEPTIC PADS (ALCOHOL PREP PADS TOP)       aspirin (ECOTRIN) 81 MG EC tablet Take 1 tablet by mouth Daily.      atorvastatin (LIPITOR) 40 MG tablet Take 1 tablet (40 mg total) by mouth once daily. 90 tablet 3    BD ULTRA-FINE OLU PEN NEEDLES 32 x 5/32 " Ndle Uses 4 times daily, on multiple daily insulin injections 130 each 12    buPROPion (WELLBUTRIN XL) 150 MG TB24 tablet Take 1 tablet (150 mg total) by mouth once daily. 30 tablet 3    carvedilol (COREG) 25 MG tablet TAKE 1 TABLET TWICE DAILY 180 tablet 3    cholecalciferol, vitamin D3, (VITAMIN D) 2,000 unit Cap Take by mouth. 1 Capsule Oral Every morning      diazePAM (VALIUM) 5 MG tablet take half a tablet to one tablet by mouth twice daily for dizziness 60 tablet 2    duloxetine (CYMBALTA) 60 MG capsule Take 1 capsule (60 mg total) by mouth once daily. 30 capsule 11    fluticasone (FLONASE) 50 mcg/actuation nasal spray SHAKE LQ AND U 2 SPRAYS IEN QD 1 Bottle 6    furosemide (LASIX) 40 MG tablet TAKE 1 TABLET twice daily 270 tablet 3    insulin aspart (NOVOLOG) 100 unit/mL InPn pen 23 units with meals plus correction scale. Max daily dose=120 units (Patient taking differently: Inject 23 Units into the skin 3 (three) times daily with meals. 23 units with meals plus correction scale. Max daily " "dose=120 units) 8 Box 3    insulin glargine (LANTUS SOLOSTAR) 100 unit/mL (3 mL) InPn pen Inject 60 Units into the skin every evening. 4 Box 3    Lactobacillus acidophilus (PROBIOTIC) 10 billion cell Cap Take 1 capsule by mouth once daily.      loratadine (CLARITIN) 10 mg tablet TAKE 1 TABLET EVERY DAY 90 tablet 4    MUSCLE RUB, WITH CAMPHOR, 4-30-10 % Crea       nitroGLYCERIN (NITROSTAT) 0.4 MG SL tablet Place 1 tablet (0.4 mg total) under the tongue every 5 (five) minutes as needed. 25 tablet 2    ondansetron (ZOFRAN) 4 MG tablet Take 1 tablet (4 mg total) by mouth every 8 (eight) hours as needed for Nausea. 30 tablet 3    oxyCODONE-acetaminophen (PERCOCET)  mg per tablet Take 1 tablet by mouth 5 (five) times daily. 150 tablet 0    oxyCODONE-acetaminophen (PERCOCET)  mg per tablet Take 1 tablet by mouth 5 (five) times daily. 150 tablet 0    pantoprazole (PROTONIX) 40 MG tablet TAKE 1 TABLET(40 MG) BY MOUTH twice daily 180 tablet 4    pen needle, diabetic (SURE-FINE PEN NEEDLES) 29 gauge x 1/2" Ndle Use 4 times daily 400 each 4    ranitidine (ZANTAC) 300 MG tablet Take 300 mg by mouth nightly.       tiZANidine (ZANAFLEX) 4 MG tablet Take 1 tablet (4 mg total) by mouth nightly as needed. 30 tablet 3    [DISCONTINUED] albuterol (PROVENTIL) 2.5 mg /3 mL (0.083 %) nebulizer solution Take 3 mLs (2.5 mg total) by nebulization every 6 (six) hours as needed for Wheezing. Rescue 90 mL 6    doxycycline (VIBRA-TABS) 100 MG tablet Take 1 tablet (100 mg total) by mouth every 12 (twelve) hours. 20 tablet 0    predniSONE (DELTASONE) 20 MG tablet Take 2 po with breakfast x 5d 10 tablet 0    triamcinolone acetonide 0.1% (KENALOG) 0.1 % cream Apply topically 3 (three) times daily. 28.4 g 0    [DISCONTINUED] tizanidine (ZANAFLEX) 4 MG tablet Take 1 tablet (4 mg total) by mouth nightly as needed. 30 tablet 3     No facility-administered encounter medications on file as of 9/6/2018.      Last 3 sets of " "Vitals  Vitals - 1 value per visit 8/15/2018 9/6/2018 9/6/2018   SYSTOLIC 136 195 118   DIASTOLIC 80 91 72   PULSE 86 76 68   TEMPERATURE - - 98.7   RESPIRATIONS - - -   SPO2 97 - 98   Weight (lb) 227.3 226 222.89   Weight (kg) 103.103 102.513 101.1   HEIGHT 5' 8" 5' 8" 5' 8"   BODY MASS INDEX 34.56 34.36 33.89   VISIT REPORT - - -   Pain Score  - 9 10   Some recent data might be hidden         Objective:      Physical Exam   Constitutional: She is oriented to person, place, and time. She appears well-developed and well-nourished. No distress.   HENT:   Head: Normocephalic and atraumatic.   Nose: Mucosal edema and rhinorrhea present.   Mouth/Throat: Oropharynx is clear and moist.   Eyes: Pupils are equal, round, and reactive to light.   Neck: Normal range of motion. Neck supple.   Cardiovascular: Normal rate and regular rhythm.   Pulmonary/Chest: Effort normal. She has decreased breath sounds in the right lower field and the left lower field.   Abdominal: Soft.   Lymphadenopathy:     She has no cervical adenopathy.   Neurological: She is alert and oriented to person, place, and time.   Skin: Skin is warm and dry. Capillary refill takes less than 2 seconds. No rash noted. She is not diaphoretic. No erythema. No pallor.   Psychiatric: She has a normal mood and affect. Her behavior is normal. Judgment and thought content normal.   Nursing note and vitals reviewed.          Lab Results   Component Value Date    WBC 10.83 04/19/2018    RBC 4.28 04/19/2018    HGB 12.0 04/19/2018    HCT 37.6 04/19/2018    MCV 88 04/19/2018    MCH 28.0 04/19/2018    MCHC 31.9 (L) 04/19/2018    RDW 14.6 (H) 04/19/2018     04/19/2018    MPV 10.4 04/19/2018    GRAN 7.1 04/19/2018    GRAN 65.3 04/19/2018    LYMPH 2.4 04/19/2018    LYMPH 22.1 04/19/2018    MONO 1.0 04/19/2018    MONO 9.1 04/19/2018    EOS 0.3 04/19/2018    BASO 0.05 04/19/2018    EOSINOPHIL 2.7 04/19/2018    BASOPHIL 0.5 04/19/2018     Lab Results   Component Value Date "    WBC 10.83 04/19/2018    HGB 12.0 04/19/2018    HCT 37.6 04/19/2018     04/19/2018    CHOL 172 04/19/2018    TRIG 85 04/19/2018    HDL 57 04/19/2018    ALT 14 04/19/2018    AST 17 04/19/2018     04/19/2018    K 4.0 04/19/2018     04/19/2018    CREATININE 1.1 04/19/2018    BUN 13 04/19/2018    CO2 31 (H) 04/19/2018    TSH 1.814 04/19/2018    INR 1.1 12/02/2016    HGBA1C 7.7 (H) 04/19/2018       Assessment:       1. COPD exacerbation    2. Asthma, currently active        Plan:           Henrietta was seen today for chills and tinnitus.    Diagnoses and all orders for this visit:    COPD exacerbation  Comments:  most likely given tobaccu use hx and use of bronchiodilators versus asthma    Orders:  -     X-Ray Chest PA And Lateral; Future  -     predniSONE (DELTASONE) 20 MG tablet; Take 2 po with breakfast x 5d  -     doxycycline (VIBRA-TABS) 100 MG tablet; Take 1 tablet (100 mg total) by mouth every 12 (twelve) hours.  -     albuterol (PROVENTIL) 2.5 mg /3 mL (0.083 %) nebulizer solution; Take 3 mLs (2.5 mg total) by nebulization every 6 (six) hours as needed for Wheezing. Rescue    Asthma, currently active  -     albuterol (PROVENTIL) 2.5 mg /3 mL (0.083 %) nebulizer solution; Take 3 mLs (2.5 mg total) by nebulization every 6 (six) hours as needed for Wheezing. Rescue      Patient Instructions   Continue to use your nebulizer every 4-6 hours    Start the Prednisone and the antibiotics, take with food    Chest xray today    To ER for any worsening

## 2018-09-06 NOTE — PATIENT INSTRUCTIONS
Continue to use your nebulizer every 4-6 hours    Start the Prednisone and the antibiotics, take with food    Chest xray today    To ER for any worsening

## 2018-09-11 NOTE — PROGRESS NOTES
"[facility]   LUMBAR TREATMENT NOTE VISIT 14      Date: 1/26/2017    Time: 10:00-11:00 am  Precautions: L TKA (2014), Hx CA L face, CTS Myn[place], gout    Pattern: Pattern 1 , no preference    Eval date: 10/25/16  Reassessment due: 11/25/16, done 11/29/16  Reassessment due: 12/29/16, done 1/4/17  Next reassessment due: 2/4/17    POC signed....10/25/16  Next POC due...01/25/17    PT/PTA face to face conference: 10/27/16 done  Conference due: 11/17/16 Pt seen by a PT with the session. 1/10/17 done  Conference due: 2/10/17      Subjective     Pt presents today with 0/10 LBP before and after treatment.. She recently resumed PT treatment after an absence due to illness.  She states that she is back to being able to perform her HEP, and is glad to be back for treatment.    Work: retired  Leisure: walking, working in yard (lives alone but has neighbors and nieces to assist if needed)  Equipment: [employer]                      History       Pattern of pain questions: Pattern 1    1.  Where is your pain the worst? back  2.  Is your pain constant or intermittent? Constant, varies in intensity  3.  Does bending forward make your typical pain worse? No, just increases "stretch"  4.  Since the start of your back pain, has there been a change in your bowel or bladder? no  5.  What can't you do now that you use to be able to do? Walking, HHCs, yardwork, working out      Objective     No environmental, cultural, spiritual, developmental or education needs expressed or noted    POSTURE at eval:  Sitting: slump sitting with FHP, rounded shoulders and increased C-curve in LSP  Standing: increased thoracic kyphosis, slight decrease in Lumbar lordosis, no shift    Correction of posture: slimline  Relevant: yes        MOVEMENT LOSS 1/4/17:  Flexion:  WFL (fingertips to near ankle) IMP  Extension: limited    Pain: no,   Sidebending RIGHT: WFL      Pain: no  Sidebending LEFT:  WFL     Pain: no  Rotation RIGHT: " WFL  Pain: no  Rotation LEFT: WFL  Pain: no    Repeated standing extensions: improved symptoms  Repeated prone extensions: slight pain, improved symptoms after decreasing ROM    Baseline IM Testing Results: 10/25/16  ROM: 0-33  Max Peak Torque: 111  Min Peak Torque: 21   Flex/ext ratio: 5.29:1    [facility] IM Testing Results: 1/4/17  ROM: 0-36 deg  MAx Peak Torque: 204 ftlbs  Min Peak Torque: 73 ftlbs  Flex Ext ratio:2.7:1 (imp)  Avg Change Sum of Forces: 171%       Treatment     Pt was instructed in and performed the following:  Cardiovascular exercise and therapeutic exercise to improve posture, lumbar spine and supporting musculature ROM, strength, and muscular endurance as follows:    [facility] 1/26/2017   Visit Number 14   VAS Pain Rating 0   Recumbent Bike Seat Pos. 20   Time 10   Extension in Lying 10   Extension in Standing 10   Flexion in Lying 10   and PPT   Lumbar Weight 70   Repetitions 17   Rating of Perceived Exertion 3   Ice - Z Lie (in min.) 10             Peripheral muscle strengthening which included 1 set of 15-20 repetitions at a slow, controlled 7 second per rep pace focused on strengthening supporting musculature for improved body mechanics and functional mobility.  Pt and therapist focused on proper form during treatment to ensure optimal strengthening of each targeted muscle group.  Machines were utilized including torso rotation, leg extension, leg curl, chest press, upright row, triceps extension, biceps curls, leg press and hip abduction.     11/1/16  Tx exercises performed today: LTR, DKTC/MOUSTAPHA, SKTC, Supine pelvic tilts, Standing Pelvic tilts - 10x ea    11/17/16:  10 reps each LTR, PPT, and DKTC on ball    HEP started as follows:   LTR, SKTC, Supine pelvic tilts - 10x ea set, 2-3 sets per day  Pt educated on HEP and activity modifications to reduce c/o pain and improve overall function. Pt also educated on use of modalities prn to reduce c/o pain,  dysfunction.    11/29/16:  Standing lumbar extensions 10x, 3x daily  Prone lumbar extensions 10x, 2xdaily      (patient has handouts and demonstrated and expressed understanding of the following)      Assessment       Pt present for her 14th visit today. Pt presents today with 0/10 LBP prior to and after treatment.  She had been absent from PT secondary to a severe sinus infection, recently resuming treatment. She reports she did not perform HEP exercises while sick on doctor's orders. She required minimal cueing for recall and performance of HEP exercises, with minimal vc's to decrease speed of performance and intensity of exercise to pain-free ROM. Pt tolerated a weight increase on the lumbar med x machine, completing 17 reps with an RPE of 3.. Pt completed all peripheral resistance exercises with no reports of increased symptoms or discomfort. Pt is progressing well towards treatment goals and would benefit from continued skilled physical therapy treatment.     PT diagnosis: chronic low back pain 2* DDD    Pattern: Pattern 1    Medical necessity is demonstrated by the following problem list.  Pt presents with the following impairments: decreased trunk and hip strength, decreased trunk ROM, decreased postural awareness and endurance, decreased NM control/coordination. Current impairments limits patient with all functional activities. Patient requires skilled PT to address remaining deficits and return patient to PLOF. Based on the above history, physical examination, and baseline IM testing, an active physical therapy program is recommended.      Prognosis is: Good    GOALS: Pt is in agreement with the following goals.    Short term goals: (5 weeks or 10 visits)  1.  Pt will demonstrate increased lumbar ROM measured by med ex by at least 3 degrees from the initial ROM value with improvements noted in functional ROM and ability to perform ADLs MET  2.  Pt will demonstrate increased maximum isometric torque value by  5% when compared to the initial value MET  3.  Pt will tolerate regular 5% increases in dynamic weight loads on all machines MET  4.  Patient report a reduction in worst pain score by 1-2 points for improved tolerance during work and recreational activities MET  5.  Pt able to perform HEP correctly with minimal cueing or supervision for therapist MET  6. Pt will demonstrate improvement in flexion/extension strength ratio compared in initial value MET    Long term goals: (10 weeks or 20 visits)  1. Pt will demonstrate increased lumbar ROM by at least 6 degrees from initial ROM value, resulting in improved ability to perform functional fwd bending while standing and sitting.    2. Pt will demonstrate increased maximum isometric torque value by 10% when compared to the initial value, resulting in improved ability to perform bending, lifting, and carrying activities safely, confidently, and 2/10 pain or less.   3. Pt will be able to ambulate community distances safely, confidently, and 2/10 pain or less.  4. Pt to demonstrate ability to independently control and reduce their pain through posture positioning and mechanical movements throughout typical work day.  5. Pt able to sleep through the night without waking with c/o pain.   6. Pt able to perform household cooking/cleaning ADLS safely, confidently, and 2/10 pain or less.  7. Pt to be able to perform self care and grooming ADLs safely, confidently, independently, and 2/10 pain or less.   8. Pt able to resume their preferred exercise regimen safely, confidently, and 2/10 pain or less.    9. Pt will be able to ascend/descend 1 flight of stairs reciprocally with use of unilateral handrail for safety, confidently and 2/10 pain or less.      Additional Specific patient expressed goals:  1. HHCs, Yardwork  2. Walking    OUTCOMES:  Functional Outcome Measure: FOTO limitation = 60% Disability  G-codes + Modifier + % Impairment: (Mobility):   Initial =  (60% - 80%  disability), Goal =  (20% - 40% disability)  [place]:FOTO: 51% limitation      Plan     Continue OPPT per POC.    No

## 2018-09-17 ENCOUNTER — OFFICE VISIT (OUTPATIENT)
Dept: PODIATRY | Facility: CLINIC | Age: 78
End: 2018-09-17
Payer: MEDICARE

## 2018-09-17 VITALS
HEIGHT: 70 IN | SYSTOLIC BLOOD PRESSURE: 186 MMHG | HEART RATE: 60 BPM | BODY MASS INDEX: 31.92 KG/M2 | DIASTOLIC BLOOD PRESSURE: 92 MMHG | WEIGHT: 223 LBS

## 2018-09-17 DIAGNOSIS — B35.1 ONYCHOMYCOSIS DUE TO DERMATOPHYTE: ICD-10-CM

## 2018-09-17 DIAGNOSIS — L84 CORN OR CALLUS: ICD-10-CM

## 2018-09-17 DIAGNOSIS — E11.42 DIABETIC POLYNEUROPATHY ASSOCIATED WITH TYPE 2 DIABETES MELLITUS: Primary | ICD-10-CM

## 2018-09-17 PROCEDURE — 11721 DEBRIDE NAIL 6 OR MORE: CPT | Mod: Q9,PBBFAC,59 | Performed by: PODIATRIST

## 2018-09-17 PROCEDURE — 3077F SYST BP >= 140 MM HG: CPT | Mod: CPTII,,, | Performed by: PODIATRIST

## 2018-09-17 PROCEDURE — 99215 OFFICE O/P EST HI 40 MIN: CPT | Mod: PBBFAC | Performed by: PODIATRIST

## 2018-09-17 PROCEDURE — 3080F DIAST BP >= 90 MM HG: CPT | Mod: CPTII,,, | Performed by: PODIATRIST

## 2018-09-17 PROCEDURE — 11721 DEBRIDE NAIL 6 OR MORE: CPT | Mod: 59,Q9,S$PBB, | Performed by: PODIATRIST

## 2018-09-17 PROCEDURE — 1101F PT FALLS ASSESS-DOCD LE1/YR: CPT | Mod: CPTII,,, | Performed by: PODIATRIST

## 2018-09-17 PROCEDURE — 99213 OFFICE O/P EST LOW 20 MIN: CPT | Mod: S$PBB,25,, | Performed by: PODIATRIST

## 2018-09-17 PROCEDURE — 99999 PR PBB SHADOW E&M-EST. PATIENT-LVL V: CPT | Mod: PBBFAC,,, | Performed by: PODIATRIST

## 2018-09-17 PROCEDURE — 11056 PARNG/CUTG B9 HYPRKR LES 2-4: CPT | Mod: Q9,59,PBBFAC | Performed by: PODIATRIST

## 2018-09-17 PROCEDURE — 11056 PARNG/CUTG B9 HYPRKR LES 2-4: CPT | Mod: Q9,S$PBB,, | Performed by: PODIATRIST

## 2018-09-19 ENCOUNTER — OFFICE VISIT (OUTPATIENT)
Dept: INTERNAL MEDICINE | Facility: CLINIC | Age: 78
End: 2018-09-19
Payer: MEDICARE

## 2018-09-19 ENCOUNTER — IMMUNIZATION (OUTPATIENT)
Dept: INTERNAL MEDICINE | Facility: CLINIC | Age: 78
End: 2018-09-19
Payer: MEDICARE

## 2018-09-19 VITALS
HEART RATE: 73 BPM | BODY MASS INDEX: 33.42 KG/M2 | DIASTOLIC BLOOD PRESSURE: 60 MMHG | HEIGHT: 68 IN | WEIGHT: 220.5 LBS | SYSTOLIC BLOOD PRESSURE: 110 MMHG

## 2018-09-19 DIAGNOSIS — I50.30 DIASTOLIC CONGESTIVE HEART FAILURE, UNSPECIFIED HF CHRONICITY: ICD-10-CM

## 2018-09-19 DIAGNOSIS — G89.29 OTHER CHRONIC BACK PAIN: Primary | Chronic | ICD-10-CM

## 2018-09-19 DIAGNOSIS — E66.9 OBESITY (BMI 30.0-34.9): ICD-10-CM

## 2018-09-19 DIAGNOSIS — Z79.4 TYPE 2 DIABETES MELLITUS WITH DIABETIC POLYNEUROPATHY, WITH LONG-TERM CURRENT USE OF INSULIN: ICD-10-CM

## 2018-09-19 DIAGNOSIS — K21.9 GASTROESOPHAGEAL REFLUX DISEASE WITHOUT ESOPHAGITIS: ICD-10-CM

## 2018-09-19 DIAGNOSIS — I10 ESSENTIAL HYPERTENSION: Chronic | ICD-10-CM

## 2018-09-19 DIAGNOSIS — M54.9 OTHER CHRONIC BACK PAIN: Primary | Chronic | ICD-10-CM

## 2018-09-19 DIAGNOSIS — E11.42 TYPE 2 DIABETES MELLITUS WITH DIABETIC POLYNEUROPATHY, WITH LONG-TERM CURRENT USE OF INSULIN: ICD-10-CM

## 2018-09-19 DIAGNOSIS — E78.5 HYPERLIPIDEMIA, UNSPECIFIED HYPERLIPIDEMIA TYPE: ICD-10-CM

## 2018-09-19 PROCEDURE — 1101F PT FALLS ASSESS-DOCD LE1/YR: CPT | Mod: CPTII,S$PBB,, | Performed by: INTERNAL MEDICINE

## 2018-09-19 PROCEDURE — 99214 OFFICE O/P EST MOD 30 MIN: CPT | Mod: S$PBB,,, | Performed by: INTERNAL MEDICINE

## 2018-09-19 PROCEDURE — 99213 OFFICE O/P EST LOW 20 MIN: CPT | Mod: PBBFAC | Performed by: INTERNAL MEDICINE

## 2018-09-19 PROCEDURE — 90662 IIV NO PRSV INCREASED AG IM: CPT | Mod: PBBFAC

## 2018-09-19 PROCEDURE — 3078F DIAST BP <80 MM HG: CPT | Mod: CPTII,S$PBB,, | Performed by: INTERNAL MEDICINE

## 2018-09-19 PROCEDURE — 3074F SYST BP LT 130 MM HG: CPT | Mod: CPTII,S$PBB,, | Performed by: INTERNAL MEDICINE

## 2018-09-19 PROCEDURE — 99999 PR PBB SHADOW E&M-EST. PATIENT-LVL III: CPT | Mod: PBBFAC,,, | Performed by: INTERNAL MEDICINE

## 2018-09-19 RX ORDER — OXYCODONE AND ACETAMINOPHEN 10; 325 MG/1; MG/1
1 TABLET ORAL
Qty: 150 TABLET | Refills: 0 | Status: SHIPPED | OUTPATIENT
Start: 2018-09-19 | End: 2018-10-18 | Stop reason: SDUPTHER

## 2018-09-19 NOTE — PROGRESS NOTES
CHIEF COMPLAINT: Follow up of dizziness, back pain, hypertension, diabetes     HISTORY OF PRESENT ILLNESS: This is a 78-year-old woman who presents for follow up of above.    She brings her medicine bottles (left insulin at home) with her today.  THey are as follows:    Cymbalta 60 mg daily (date 8/22/17 #90 dispensed with full bottle of pills left)  Oxycodone apap 10/325 (date 8/23/18 #150 dispensed with 7 pills left)  Pantoprazole 40 mg (date 4/10/18 #180 dispensed with at least 40-50 pills left)  Carvedilol 25 mg (date 5/9/18 #180 dispensed with half bottle of pills left)  Vitamin D3 2000 units (200 in bottle - - half bottle left - over the counter)  Loratadine 10 mg daily (date 9/28/17 - #90 dispensed with about 30 pills left)  Aspirin 81 mg (over the counter bottle of 365 tablets)  Furosemide 40 mg twice daily (date 5/23/28 #180 dispensed with half bottle left)     Breathing has been ok. She has not needed her albuterol breathing treatments lately (did not bring medication).  Mucous production has resolved.  No fever or chills. \    She is not taking atorvastatin, diazepam.    Mood has been worse lately. She lives alone. Money has been tight. NO homicidal or suicidal ideations. She tried Wellbutrin - she states it caused nausea vomiting and diarrhea so she stopped it. She continues to take CYmabalta 60 mg daily.     She has not felt well - she has not been eating much and has not been taking Novolog. She has trouble affording food.   She is taking Lantus 60 units in the evening.  Sugars have been up and down.      Heartburn is controlled. She is taking pantoprazole 40 mg once daily .    She continues to have low back pain. She states her back has been worse.  She saw Dr Le 6/5/18 and had MRI lumbar spine 6/12/18 - Moderate degenerative change at L4-5.  Dr Le recommended epidural - she is not sure if she wants to do the epidural. She takes oxycodone apap 10/325 one tablet 3-5 times daily depending on  "her pain. SHe takes Cymbalta 60 mg daily which helps her pain and her mood.        She continues to take aspirin 81 mg daily, coreg 25 mg twice daily and lasix 40 mg twice daily for her diastolic dysfunction and hypertension. She denies lightheadedness. No chest pain or shortness of breath. She is not taking atorvastatin 40 mg once daily for her cholesterol.     She states she has bills that are piling up.  She has trouble affording paying her bills. She has trouble affording food.  She has 5 children who live out of state.  She has 8 grandchildren and 24 great grandchildren.  She states she has no one to help her.  She has one grandchild who lives in [place].          PAST MEDICAL HISTORY:   1. Diabetes mellitus   2. Hypertension   3. Hyperlipidemia   4. Left heart catheterization January 2007 which revealed luminal irregularities in the LAD, left circumflex and right coronary artery. She had diastolic dysfunction and patent renal arteries.   5. Sleep apnea   6. Obesity.   7. Nephrolithiasis status post lithotripsy.   8. Reflux - had nonerosive gastropathy on EGD September 2007. Gastritis on EGD 9/2010  9. Hidradenitis suppurativa.   10. History of diverticulitis with a hospitalized October 2007.   11. Migraines   12. Obesity.   13. 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    14. Total hysterectomy in 1969.   15. Cholecystectomy was done at that time as well.   16. 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   17. Colon polyps on colonscopy 11/2010 - due 2013   18. Hospitalization 12/10 for e coli sepsis due to left ureteral stone with left nephrostomy tube in [place]   19. Left knee replacement 9/2012      MEDICATIONS and ALLERGIES: Updated on EPIC.     PHYSICAL EXAMINATION:     /60 (BP Location: Right arm, Patient Position: Sitting, BP Method: Medium (Manual))   Pulse 73   Ht 5' 8" (1.727 m)   Wt 100 kg (220 lb 8 oz)   " LMP  (LMP Unknown)   BMI 33.53 kg/m²      GENERAL: She is alert, oriented, no apparent distress. Affect within   normal limits.  Conjunctiva anicteric. . Oropharynx clear.   NECK: Supple.   Respiratory: Effort normal. Lungs clear  HEART: Regular rate and rhythm without murmurs, gallops or rubs.   No lower extremity edema.  .  Gait normal.      ASSESSMENT AND PLAN:   1. Social issues- suspect not taking medication regularly - home health for education of medications, disease processes and help with compliance with medications.  Case management referral for  for food stamps. Stressed that she get her family to help her.   2. Mood disorder - continue cymbalta for now. To increase social supports.     3. neck, shoulder and back pain - home health physical therapy   4. Asthma - stable   6. HTN -  monitor BP at home. Wonder if overmedicated when she takes her medications.   7. Gerd - stable pantoprazole to 40 mg daily   8. Diabetes with neuropathy- lwatch sugars  9. Hyperlipidemia - off lipitor  10. Allergic rhinitis - stable  11. Diastolic congestive heart failure - stable.    12. CRI -stable  13. Obesity - discussed diet, exercise and weight loss  14. CAD -risk factor modification  15. Aortic atherosclerosis and possible mesenteric artery stenosis- risk factor modification  16. Tortuous aorta - HTN controlled  17. Colon polyps - Colonoscopy in 8/2013 - 2 polyps. Flex sig 11/2013 - mild granular mucosa.  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 Attempted colonoscopy 11/2016 du to diarrhea which has resolved. She had poor prep with hyperplastic rectal biopsy.   MMG 1/18  Prevnar 10/15 and flu shot 11/16  I will see her back in 2 weeks sooner if issues. .

## 2018-09-20 ENCOUNTER — OUTPATIENT CASE MANAGEMENT (OUTPATIENT)
Dept: ADMINISTRATIVE | Facility: OTHER | Age: 78
End: 2018-09-20

## 2018-09-20 ENCOUNTER — TELEPHONE (OUTPATIENT)
Dept: INTERNAL MEDICINE | Facility: CLINIC | Age: 78
End: 2018-09-20

## 2018-09-20 NOTE — TELEPHONE ENCOUNTER
----- Message from Deborah Grant sent at 9/20/2018  4:10 PM CDT -----  Contact: Jovanny/ [facility]/ [phone]  [facility] - was unable to contact patient to admit her, they try again on tomorrow.    Please advise, thank you

## 2018-09-20 NOTE — TELEPHONE ENCOUNTER
----- Message from Sarah Zarate sent at 9/20/2018 11:01 AM CDT -----  Thank you for the referral.  Patient has been assigned to Mercedes Costello LMSW for low risk screening for Outpatient Case Management.      Reason for referral: Other chronic back pain  Diastolic congestive heart failure, unspecified HF chronicity  Essential hypertension  Gastroesophageal reflux disease without esophagitis  Hyperlipidemia, unspecified hyperlipidemia type  Obesity (BMI 30.0-34.9)  Type 2 diabetes mellitus with diabetic polyneuropathy, with long-term current use of insulin     Please contact [facility] at [phone]. [phone] with any questions.     Sarah Zarate    [facility]

## 2018-09-20 NOTE — PROGRESS NOTES
Thank you for the referral.  Patient has been assigned to Mercedes Costello LMSW for low risk screening for Outpatient Case Management.     Reason for referral: Other chronic back pain  Diastolic congestive heart failure, unspecified HF chronicity  Essential hypertension  Gastroesophageal reflux disease without esophagitis  Hyperlipidemia, unspecified hyperlipidemia type  Obesity (BMI 30.0-34.9)  Type 2 diabetes mellitus with diabetic polyneuropathy, with long-term current use of insulin    Please contact [facility] at [phone]. [phone] with any questions.    Sarah Zarate    [facility]

## 2018-09-21 ENCOUNTER — OUTPATIENT CASE MANAGEMENT (OUTPATIENT)
Dept: ADMINISTRATIVE | Facility: OTHER | Age: 78
End: 2018-09-21

## 2018-09-24 ENCOUNTER — TELEPHONE (OUTPATIENT)
Dept: INTERNAL MEDICINE | Facility: CLINIC | Age: 78
End: 2018-09-24

## 2018-09-24 ENCOUNTER — OUTPATIENT CASE MANAGEMENT (OUTPATIENT)
Dept: ADMINISTRATIVE | Facility: OTHER | Age: 78
End: 2018-09-24

## 2018-09-24 DIAGNOSIS — E13.9 DIABETES MELLITUS DUE TO ABNORMAL INSULIN: Primary | ICD-10-CM

## 2018-09-24 RX ORDER — CARVEDILOL 25 MG/1
TABLET ORAL
Qty: 180 TABLET | Refills: 3 | Status: SHIPPED | OUTPATIENT
Start: 2018-09-24 | End: 2019-10-17 | Stop reason: SDUPTHER

## 2018-09-24 NOTE — TELEPHONE ENCOUNTER
----- Message from Mercedes Costello LMSW sent at 9/24/2018  9:18 AM CDT -----  This SW received a referral on the above patient. I have attempted to contact the patient or caregiver by phone two times unsuccessfully and mailed a letter with our contact information.    Thank you for the referral,    Mercedes Costello LMSW

## 2018-09-24 NOTE — PROGRESS NOTES
Subjective:      Patient ID: Henrietta Villasenor is a 78 y.o. female.    Chief Complaint: Diabetic Foot Exam (deven gilmore08/15/2018) and Diabetes Mellitus    Henrietta is a 78 y.o. female who presents to the clinic for evaluation and treatment of high risk feet. Henrietta has a past medical history of Abnormality of lung (11/08/2011), Anxiety, Arthritis, CAD (coronary artery disease), Calculus of ureter (2/22/2011), Cataract, CHF (congestive heart failure), Chronic back pain (7/29/2012), Colon polyp (9/2010; 11/2010), Colon polyps (7/29/2012), Coronary artery disease involving native coronary artery of native heart with angina pectoris, Depression, Diabetes mellitus, Diabetes mellitus type II, Diverticulitis large intestine (7/27/2015), Diverticulosis (09/25/2010; 11/02/2010; 11/08/2011; 7/29/2012), Duodenal disorder (08/25/2011), Duodenal ulcer, unspecified as acute or chronic, without hemorrhage, perforation, or obstruction (8/24/2011), E. 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), 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), Obesity, MARLON (obstructive sleep apnea), Pain, Peripheral neuropathy, Pneumonia, Polyneuropathy, Postherpetic neuralgia, Recurrent nephrolithiasis, S/P knee replacement (10/2/2012), S/P TKR (total knee replacement) (12/26/2012), Sensorineural hearing loss of both ears, Thyroid disease (11/08/2011), Trouble in sleeping, Type II or unspecified type  diabetes mellitus with neurological manifestations, not stated as uncontrolled(250.60), Type II or unspecified type diabetes mellitus with peripheral circulatory disorders, not stated as uncontrolled(250.70), and Type II or unspecified type diabetes mellitus with renal manifestations, not stated as uncontrolled(250.40). The patient's chief complaint is long, thick toenails. This patient has documented high risk feet requiring routine maintenance secondary to diabetes mellitis and those secondary complications of diabetes, as mentioned..       PCP: Deven Gilmore MD    Date Last Seen by PCP:   Chief Complaint   Patient presents with    Diabetic Foot Exam     deven gilmore08/15/2018    Diabetes Mellitus         Current shoe gear: casual shoes    Hemoglobin A1C   Date Value Ref Range Status   04/19/2018 7.7 (H) 4.0 - 5.6 % Final     Comment:     According to ADA guidelines, hemoglobin A1c <7.0% represents  optimal control in non-pregnant diabetic patients. Different  metrics may apply to specific patient populations.   Standards of Medical Care in Diabetes-2016.  For the purpose of screening for the presence of diabetes:  <5.7%     Consistent with the absence of diabetes  5.7-6.4%  Consistent with increasing risk for diabetes   (prediabetes)  >or=6.5%  Consistent with diabetes  Currently, no consensus exists for use of hemoglobin A1c  for diagnosis of diabetes for children.  This Hemoglobin A1c assay has significant interference with fetal   hemoglobin   (HbF). The results are invalid for patients with abnormal amounts of   HbF,   including those with known Hereditary Persistence   of Fetal Hemoglobin. Heterozygous hemoglobin variants (HbAS, HbAC,   HbAD, HbAE, HbA2) do not significantly interfere with this assay;   however, presence of multiple variants in a sample may impact the %   interference.     12/08/2017 7.8 (H) 4.0 - 5.6 % Final     Comment:     According to ADA guidelines, hemoglobin A1c <7.0%  represents  optimal control in non-pregnant diabetic patients. Different  metrics may apply to specific patient populations.   Standards of Medical Care in Diabetes-2016.  For the purpose of screening for the presence of diabetes:  <5.7%     Consistent with the absence of diabetes  5.7-6.4%  Consistent with increasing risk for diabetes   (prediabetes)  >or=6.5%  Consistent with diabetes  Currently, no consensus exists for use of hemoglobin A1c  for diagnosis of diabetes for children.  This Hemoglobin A1c assay has significant interference with fetal   hemoglobin   (HbF). The results are invalid for patients with abnormal amounts of   HbF,   including those with known Hereditary Persistence   of Fetal Hemoglobin. Heterozygous hemoglobin variants (HbAS, HbAC,   HbAD, HbAE, HbA2) do not significantly interfere with this assay;   however, presence of multiple variants in a sample may impact the %   interference.     08/18/2017 7.3 (H) 4.0 - 5.6 % Final     Comment:     According to ADA guidelines, hemoglobin A1c <7.0% represents  optimal control in non-pregnant diabetic patients. Different  metrics may apply to specific patient populations.   Standards of Medical Care in Diabetes-2016.  For the purpose of screening for the presence of diabetes:  <5.7%     Consistent with the absence of diabetes  5.7-6.4%  Consistent with increasing risk for diabetes   (prediabetes)  >or=6.5%  Consistent with diabetes  Currently, no consensus exists for use of hemoglobin A1c  for diagnosis of diabetes for children.  This Hemoglobin A1c assay has significant interference with fetal   hemoglobin   (HbF). The results are invalid for patients with abnormal amounts of   HbF,   including those with known Hereditary Persistence   of Fetal Hemoglobin. Heterozygous hemoglobin variants (HbAS, HbAC,   HbAD, HbAE, HbA2) do not significantly interfere with this assay;   however, presence of multiple variants in a sample may impact the %  "  interference.     08/18/2017 7.3 (H) 4.0 - 5.6 % Final     Comment:     According to ADA guidelines, hemoglobin A1c <7.0% represents  optimal control in non-pregnant diabetic patients. Different  metrics may apply to specific patient populations.   Standards of Medical Care in Diabetes-2016.  For the purpose of screening for the presence of diabetes:  <5.7%     Consistent with the absence of diabetes  5.7-6.4%  Consistent with increasing risk for diabetes   (prediabetes)  >or=6.5%  Consistent with diabetes  Currently, no consensus exists for use of hemoglobin A1c  for diagnosis of diabetes for children.  This Hemoglobin A1c assay has significant interference with fetal   hemoglobin   (HbF). The results are invalid for patients with abnormal amounts of   HbF,   including those with known Hereditary Persistence   of Fetal Hemoglobin. Heterozygous hemoglobin variants (HbAS, HbAC,   HbAD, HbAE, HbA2) do not significantly interfere with this assay;   however, presence of multiple variants in a sample may impact the %   interference.         Review of Systems   Constitution: Negative for chills, decreased appetite and fever.   Cardiovascular: Negative for leg swelling.   Skin: Positive for color change, dry skin and nail changes. Negative for flushing and itching.   Musculoskeletal: Positive for arthritis. Negative for back pain, falls, joint pain, joint swelling, muscle cramps and myalgias.   Gastrointestinal: Negative for nausea and vomiting.   Neurological: Positive for numbness and paresthesias. Negative for loss of balance.           Objective:       Vitals:    09/17/18 0848   BP: (!) 186/92   Pulse: 60   Weight: 101.2 kg (223 lb)   Height: 5' 9.6" (1.768 m)   PainSc: 0-No pain        Physical Exam   Constitutional: She is oriented to person, place, and time. She appears well-developed and well-nourished.   Cardiovascular:   Dorsalis pedis and posterior tibial pulses are diminished Bilaterally. Toes are cool to " touch. Feet are warm proximally.There is decreased digital hair. Skin is atrophic, slightly hyperpigmented, and mildly edematous       Musculoskeletal: Normal range of motion. She exhibits no edema or tenderness.   Adequate joint range of motion without pain, limitation, nor crepitation Bilateral feet and ankle joints. Muscle strength is 5/5 in all groups bilaterally.      semi reducible IPJ digital contracture 2nd toe b/l    Neurological: She is alert and oriented to person, place, and time.   [place] 5.07 monofilamant testing is diminished Mynor feet. Sharp/dull sensation diminished Bilaterally. Light touch absent Bilaterally.       Skin: Skin is warm, dry and intact. No abrasion, no bruising, no burn, no ecchymosis and no lesion noted. No erythema.   Nails x 10  are elongated by  4-6mm's, thickened by 2-3 mm's, dystrophic, and are darkened in  coloration . Xerosis Bilaterally. No open lesions noted.    Hyperkeratotic tissue noted to distal toes 2and 4 b/l        Psychiatric: She has a normal mood and affect. Her behavior is normal.   Nursing note and vitals reviewed.            Assessment:       Encounter Diagnoses   Name Primary?    Diabetic polyneuropathy associated with type 2 diabetes mellitus Yes    Onychomycosis due to dermatophyte     Corn or callus          Plan:       Henrietta was seen today for diabetic foot exam and diabetes mellitus.    Diagnoses and all orders for this visit:    Diabetic polyneuropathy associated with type 2 diabetes mellitus    Onychomycosis due to dermatophyte    Corn or callus      I counseled the patient on her conditions, their implications and medical management.    Shoe inspection. Diabetic Foot Education. Patient reminded of the importance of good nutrition and blood sugar control to help prevent podiatric complications of diabetes. Patient instructed on proper foot hygeine. We discussed wearing proper shoe gear, daily foot inspections, never walking without  protective shoe gear, never putting sharp instruments to feet    - With patient's permission, nails were aggressively reduced and debrided x 10 to their soft tissue attachment mechanically and with electric , removing all offending nail and debris. Patient relates relief following the procedure. She will continue to monitor the areas daily, inspect her feet, wear protective shoe gear when ambulatory, moisturizer to maintain skin integrity and follow in this office in approximately 2-3 months, sooner p.r.n.    - After cleansing the  area w/ alcohol prep pad the above mentioned hyperkeratosis was trimmed utilizing No 15 scapel, to a smooth base with out incident. Patient tolerated this  well and reported comfort to the area of distal toes 2 and 4 b/l   .

## 2018-09-24 NOTE — LETTER
September 24, 2018    Henrietta Villasenor  [address] [address]             [facility] [address] [place] [address] Dear: Henrietta Villasenor    I am writing from the Outpatient Complex Care Management Department at [facility].  I received a referral from Dr. Jeannine Arriaga to contact you or your caregiver regarding any needs you may have. I have attempted to contact you or your cargeiver by phone two times unsuccessfully.  Please contact the Outpatient Complex Care Management Department at [phone] if you would like to discuss your needs.      Sincerely,         Mercedes Costello LMSW

## 2018-09-24 NOTE — PROGRESS NOTES
2nd attempt    This LMSW attempted to reach patient/caregiver to provide resource and left msg requesting a return call.  Letter with contact information was sent via US Mail  to patient/caregiver.  Referral source notified.      LMSW received a return call from patient sary/ Kacie. Kacie provided this LMSW with patient correct phone number. LMSW updated patient contact information .       This LMSW received a referral on the above patient.   Reason for referral:Other chronic back pain   Diastolic congestive heart failure, unspecified HF chronicity   Essential hypertension   Gastroesophageal reflux disease without esophagitis   Hyperlipidemia, unspecified hyperlipidemia type   Obesity (BMI 30.0-34.9)   Type 2 diabetes mellitus with diabetic polyneuropathy, with long-term current use of insulin   Name of the community resource that was provided: Medicaid Community Choice Waiver, Diabetic Educator, Emergency Alert System, [facility] Financial Assistance, Senior [facility], [facility] On [facility], Advance Directives, Senior Resource Guide, Commodity Supplemental Food Program  Resource given to: Patient  via US Mail and Telephone      This LMSW completed assessment with patient. Patient currently resides alone, however grandson is there currently. Patient reports her niece Kacie checks on her daily as well as daughters who resides out of town. Patient denied needing assistance with utilities at this time. Patient reports her electricity bill is now manageable. Patient reports her highest electric bill was a 1000.00 dollar. Patient reports she reached out to [facility] to change her meter. Patient reports she has since been in contact with [facility] for assistance with bill, however assistance is only available every six months. Patient denied needing additional assistance with lights or water at this time. Patient denied needing assistance with shelter and food. Patient reports she receives approximately 89.0 dollars in SNAP benefits. Patient  reports she stop receiving commodities, due to transportation. Patient reports she now has transportation. LMSW provided patient with information for Commodity Supplemental Food Program to assist with additional food resources.  Patient denied needing assistance with medication. Patient reports needing assistance with managing her diabetes. LMSW sent an in-basket message to PCP to please place orders for a diabetic educator to assist with additional teachings. Patient reports she drives herself to and from appointments. Patient seeking additional assistance with Long Term Planning. LMSW provided patient with an Advance Directive. Patient requesting resources for senior centers in her area. LMSW provided patient with a list of senior centers in her area.  Patient reports [employer] will assist her with any forms that needs to be complete. LMSW mailed all resources and provided her contact information for any additional needs    LMSW contacted [facility] intake department. JILLSW spoke with Thais and advised her of correction  with patient phone number [phone]. Thais voiced understanding and advised this LMSW she will update board.

## 2018-09-25 NOTE — TELEPHONE ENCOUNTER
----- Message from Mercedes Costello LMSW sent at 9/24/2018  3:46 PM CDT -----  This LMSW received a referral on the above patient. This LMSW provided patient with the following resource: Medicaid Community Choice Waiver, Diabetic Educator, Emergency Alert System, [facility] Financial Assistance, Senior Centers, [facility] On [facility], Advance Directives, Senior Resource Guide, Commodity Supplemental Food Program . The resource can be located within [facility] under the Care, Legal, Money Food category.    Dr. Arriaga patient requesting a referral to Diabetic Educations to assist with additional teachings. Please place referral if an agreement with request.     Thank you for the referral,    Mercedes Costello LMSW

## 2018-10-03 ENCOUNTER — LAB VISIT (OUTPATIENT)
Dept: LAB | Facility: HOSPITAL | Age: 78
End: 2018-10-03
Attending: INTERNAL MEDICINE
Payer: MEDICARE

## 2018-10-03 ENCOUNTER — OFFICE VISIT (OUTPATIENT)
Dept: INTERNAL MEDICINE | Facility: CLINIC | Age: 78
End: 2018-10-03
Payer: MEDICARE

## 2018-10-03 VITALS
BODY MASS INDEX: 33.21 KG/M2 | WEIGHT: 218.38 LBS | DIASTOLIC BLOOD PRESSURE: 60 MMHG | HEART RATE: 68 BPM | SYSTOLIC BLOOD PRESSURE: 118 MMHG | OXYGEN SATURATION: 98 %

## 2018-10-03 DIAGNOSIS — N18.30 TYPE 2 DIABETES MELLITUS WITH STAGE 3 CHRONIC KIDNEY DISEASE, WITH LONG-TERM CURRENT USE OF INSULIN: ICD-10-CM

## 2018-10-03 DIAGNOSIS — K21.9 GASTROESOPHAGEAL REFLUX DISEASE WITHOUT ESOPHAGITIS: ICD-10-CM

## 2018-10-03 DIAGNOSIS — M54.9 BACK PAIN, UNSPECIFIED BACK LOCATION, UNSPECIFIED BACK PAIN LATERALITY, UNSPECIFIED CHRONICITY: ICD-10-CM

## 2018-10-03 DIAGNOSIS — Z79.4 TYPE 2 DIABETES MELLITUS WITH STAGE 3 CHRONIC KIDNEY DISEASE, WITH LONG-TERM CURRENT USE OF INSULIN: ICD-10-CM

## 2018-10-03 DIAGNOSIS — R10.9 ABDOMINAL PAIN, UNSPECIFIED ABDOMINAL LOCATION: ICD-10-CM

## 2018-10-03 DIAGNOSIS — E11.22 TYPE 2 DIABETES MELLITUS WITH STAGE 3 CHRONIC KIDNEY DISEASE, WITH LONG-TERM CURRENT USE OF INSULIN: ICD-10-CM

## 2018-10-03 DIAGNOSIS — E13.9 DIABETES MELLITUS DUE TO ABNORMAL INSULIN: ICD-10-CM

## 2018-10-03 DIAGNOSIS — I10 ESSENTIAL HYPERTENSION: Chronic | ICD-10-CM

## 2018-10-03 DIAGNOSIS — R10.9 ABDOMINAL PAIN, UNSPECIFIED ABDOMINAL LOCATION: Primary | ICD-10-CM

## 2018-10-03 DIAGNOSIS — E78.5 HYPERLIPIDEMIA, UNSPECIFIED HYPERLIPIDEMIA TYPE: ICD-10-CM

## 2018-10-03 LAB
ALBUMIN SERPL BCP-MCNC: 3.1 G/DL
ALP SERPL-CCNC: 82 U/L
ALT SERPL W/O P-5'-P-CCNC: 11 U/L
AMYLASE SERPL-CCNC: 25 U/L
ANION GAP SERPL CALC-SCNC: 13 MMOL/L
AST SERPL-CCNC: 16 U/L
BASOPHILS # BLD AUTO: 0.03 K/UL
BASOPHILS NFR BLD: 0.3 %
BILIRUB SERPL-MCNC: 0.6 MG/DL
BUN SERPL-MCNC: 8 MG/DL
CALCIUM SERPL-MCNC: 9.3 MG/DL
CHLORIDE SERPL-SCNC: 96 MMOL/L
CK SERPL-CCNC: 79 U/L
CO2 SERPL-SCNC: 31 MMOL/L
CREAT SERPL-MCNC: 1.2 MG/DL
CRP SERPL-MCNC: 25.1 MG/L
DIFFERENTIAL METHOD: ABNORMAL
EOSINOPHIL # BLD AUTO: 0.2 K/UL
EOSINOPHIL NFR BLD: 1.7 %
ERYTHROCYTE [DISTWIDTH] IN BLOOD BY AUTOMATED COUNT: 14.1 %
ERYTHROCYTE [SEDIMENTATION RATE] IN BLOOD BY WESTERGREN METHOD: 62 MM/HR
EST. GFR  (AFRICAN AMERICAN): 50 ML/MIN/1.73 M^2
EST. GFR  (NON AFRICAN AMERICAN): 43 ML/MIN/1.73 M^2
GLUCOSE SERPL-MCNC: 220 MG/DL
HCT VFR BLD AUTO: 39.4 %
HGB BLD-MCNC: 12.5 G/DL
LIPASE SERPL-CCNC: 16 U/L
LYMPHOCYTES # BLD AUTO: 2.2 K/UL
LYMPHOCYTES NFR BLD: 18.3 %
MCH RBC QN AUTO: 27.7 PG
MCHC RBC AUTO-ENTMCNC: 31.7 G/DL
MCV RBC AUTO: 87 FL
MONOCYTES # BLD AUTO: 1.1 K/UL
MONOCYTES NFR BLD: 9.1 %
NEUTROPHILS # BLD AUTO: 8.4 K/UL
NEUTROPHILS NFR BLD: 70.3 %
PLATELET # BLD AUTO: 285 K/UL
PMV BLD AUTO: 10.2 FL
POTASSIUM SERPL-SCNC: 3.2 MMOL/L
PROT SERPL-MCNC: 7.6 G/DL
RBC # BLD AUTO: 4.52 M/UL
SODIUM SERPL-SCNC: 140 MMOL/L
URATE SERPL-MCNC: 8.8 MG/DL
WBC # BLD AUTO: 11.9 K/UL

## 2018-10-03 PROCEDURE — 86140 C-REACTIVE PROTEIN: CPT

## 2018-10-03 PROCEDURE — 83690 ASSAY OF LIPASE: CPT

## 2018-10-03 PROCEDURE — 83036 HEMOGLOBIN GLYCOSYLATED A1C: CPT

## 2018-10-03 PROCEDURE — 82150 ASSAY OF AMYLASE: CPT

## 2018-10-03 PROCEDURE — 85652 RBC SED RATE AUTOMATED: CPT

## 2018-10-03 PROCEDURE — 84550 ASSAY OF BLOOD/URIC ACID: CPT

## 2018-10-03 PROCEDURE — 99214 OFFICE O/P EST MOD 30 MIN: CPT | Mod: S$PBB,,, | Performed by: INTERNAL MEDICINE

## 2018-10-03 PROCEDURE — 85025 COMPLETE CBC W/AUTO DIFF WBC: CPT

## 2018-10-03 PROCEDURE — 80053 COMPREHEN METABOLIC PANEL: CPT

## 2018-10-03 PROCEDURE — 82550 ASSAY OF CK (CPK): CPT

## 2018-10-03 PROCEDURE — 36415 COLL VENOUS BLD VENIPUNCTURE: CPT

## 2018-10-03 PROCEDURE — 99999 PR PBB SHADOW E&M-EST. PATIENT-LVL IV: CPT | Mod: PBBFAC,,, | Performed by: INTERNAL MEDICINE

## 2018-10-03 PROCEDURE — 3078F DIAST BP <80 MM HG: CPT | Mod: CPTII,,, | Performed by: INTERNAL MEDICINE

## 2018-10-03 PROCEDURE — 3074F SYST BP LT 130 MM HG: CPT | Mod: CPTII,,, | Performed by: INTERNAL MEDICINE

## 2018-10-03 PROCEDURE — 99214 OFFICE O/P EST MOD 30 MIN: CPT | Mod: PBBFAC | Performed by: INTERNAL MEDICINE

## 2018-10-03 PROCEDURE — 1101F PT FALLS ASSESS-DOCD LE1/YR: CPT | Mod: CPTII,,, | Performed by: INTERNAL MEDICINE
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
## 2018-10-04 LAB
ESTIMATED AVG GLUCOSE: 194 MG/DL
HBA1C MFR BLD HPLC: 8.4 %

## 2018-10-05 ENCOUNTER — OUTPATIENT CASE MANAGEMENT (OUTPATIENT)
Dept: ADMINISTRATIVE | Facility: OTHER | Age: 78
End: 2018-10-05

## 2018-10-05 NOTE — PROGRESS NOTES
Thank you for the referral.  Patient has been assigned to Mercedes Costello LMSW for low risk screening for Outpatient Case Management.     Reason for referral: Abdominal pain, unspecified abdominal location  Back pain, unspecified back location, unspecified back pain laterality, unspecified chronicity    Please contact [facility] at ext. [phone] with any questions.    Sarah Zarate    OPCM

## 2018-10-07 ENCOUNTER — TELEPHONE (OUTPATIENT)
Dept: INTERNAL MEDICINE | Facility: CLINIC | Age: 78
End: 2018-10-07

## 2018-10-07 RX ORDER — POTASSIUM CHLORIDE 750 MG/1
10 TABLET, EXTENDED RELEASE ORAL 2 TIMES DAILY
Qty: 60 TABLET | Refills: 11 | Status: SHIPPED | OUTPATIENT
Start: 2018-10-07 | End: 2019-11-18 | Stop reason: SDUPTHER

## 2018-10-07 NOTE — TELEPHONE ENCOUNTER
Please notify pt that her potassium level is low  She needs to start on potassium supplement twice daliy - rx sent to [facility] on [address]

## 2018-10-08 ENCOUNTER — OUTPATIENT CASE MANAGEMENT (OUTPATIENT)
Dept: ADMINISTRATIVE | Facility: OTHER | Age: 78
End: 2018-10-08

## 2018-10-08 NOTE — PROGRESS NOTES
Attempt #:  1  This LMSW attempted to reach patient to provide resource . Patient reports unavailable at this time. Patient reports  HH is in the home at this time. LMSW will try call later today or tomorrow

## 2018-10-09 ENCOUNTER — TELEPHONE (OUTPATIENT)
Dept: OPHTHALMOLOGY | Facility: CLINIC | Age: 78
End: 2018-10-09

## 2018-10-09 ENCOUNTER — OUTPATIENT CASE MANAGEMENT (OUTPATIENT)
Dept: ADMINISTRATIVE | Facility: OTHER | Age: 78
End: 2018-10-09

## 2018-10-09 NOTE — TELEPHONE ENCOUNTER
----- Message from Doris Gan sent at 10/9/2018 10:46 AM CDT -----  Contact: Henrietta  Needs Advice    Reason for call:Pt called to f/u on getting a refill on her eye drops and surgery information.         Communication Preference:[phone]    Additional Information:

## 2018-10-09 NOTE — PROGRESS NOTES
2nd attempt    This LMSW attempted to reach patient/caregiver to provide resource and left msg requesting a return call.  LMSW will attempt tomorrow.

## 2018-10-10 ENCOUNTER — TELEPHONE (OUTPATIENT)
Dept: INTERNAL MEDICINE | Facility: CLINIC | Age: 78
End: 2018-10-10

## 2018-10-10 ENCOUNTER — OUTPATIENT CASE MANAGEMENT (OUTPATIENT)
Dept: ADMINISTRATIVE | Facility: OTHER | Age: 78
End: 2018-10-10
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
## 2018-10-10 NOTE — TELEPHONE ENCOUNTER
----- Message from Mercedes Costello LMSW sent at 10/10/2018  2:45 PM CDT -----  This SW received a referral on the above patient. I have previously provided this patient with resources regarding Medicaid Community Choice Waiver,  Emergency Alert System, [facility] Financial Assistance, Senior Centers, [facility] On [facility], Advance Directives, Senior Resource Guide, Commodity Supplemental Food Program.  This patient  has not accessed these community resources andcurrently on a waiting list with the [facility] on Aging for services. Patient reports will contact agencies on tomorrow.     Dr. Arriaga patient is reporting stomach pains. Patient reports being scared to eat due to these pains.  Please reach out to patient and offer assistance with concerns.     Thank you for the referral,    Mercedes Costello LMSW

## 2018-10-11 ENCOUNTER — CLINICAL SUPPORT (OUTPATIENT)
Dept: DIABETES | Facility: CLINIC | Age: 78
End: 2018-10-11
Payer: MEDICARE

## 2018-10-11 DIAGNOSIS — E13.9 DIABETES MELLITUS DUE TO ABNORMAL INSULIN: ICD-10-CM

## 2018-10-11 PROCEDURE — G0108 DIAB MANAGE TRN  PER INDIV: HCPCS | Mod: PBBFAC | Performed by: DIETITIAN, REGISTERED

## 2018-10-11 NOTE — Clinical Note
Can we possibly try getting an Rx for Glucerna or Boost Glucose? She is still not eating much - though she does say she doesn't have any GI upset when she has the shakes. She has had 5.5% unintentional weight loss in the last 4 months if that will make a difference in getting coverage. Thanks!

## 2018-10-11 NOTE — PROGRESS NOTES
"Diabetes Education  Author: Aliya Davison RD  Date: 10/11/2018    Diabetes Care Management Summary  Diabetes Education Record Assessment: Initial     Current Diabetes Risk Level: Moderate     Last A1c:   Lab Results   Component Value Date    HGBA1C 8.4 (H) 10/03/2018     Last visit with Diabetes Educator: June 2017      Diabetes Type  Diabetes Type : Type II    Diabetes History  Diabetes Diagnosis: >10 years    Current Treatment: Insulin (Lantus 60 units daily (evening), Novolog 23 units AC (not taking often as she is not eating much))        Health Maintenance was reviewed today with patient. Discussed with patient importance of routine eye exams, foot exams/foot care, blood work (i.e.: A1c, microalbumin, and lipid), dental visits, yearly flu vaccine, and pneumonia vaccine as indicated by PCP. Patient verbalized understanding.     Health Maintenance Topics with due status: Not Due       Topic Last Completion Date    TETANUS VACCINE 03/20/2014    Colonoscopy 06/14/2017    Eye Exam 01/08/2018    Lipid Panel 04/19/2018    Foot Exam 05/21/2018    Urine Drug Screen 06/05/2018    DEXA SCAN 06/12/2018    Hemoglobin A1c 10/03/2018     There are no preventive care reminders to display for this patient.        Nutrition  Meal Planning: skipping meals (having trouble eating - has stomach pain when eating (cramping or "straight to bathroom")  What type of beverages do you drink?: water, juice (drinking fruit juice throughout the day (that's what I'm living off of right now))  Meal Plan 24 Hour Recall - Lunch: (occasional Glucerna, when she can afford it (Glucerna does not make her stomach hurt))  Meal Plan 24 Hour Recall - Dinner: (salad and turkey sandwich)    Monitoring   Self Monitoring : (SMBG - only testing when taking Novolog (used to test 3-4 times daily); reports BG this morning was 120 mg/dL)  Blood Glucose Logs: No  In the last month, how often have you had a low blood sugar reaction?: never    Exercise   Exercise " "Type: none (uses cane for walking)    Social History  Preferred Learning Method: Face to Face, Reading Materials  Primary Support: Self  Occupation: (retired)  Smoking Status: Ex Smoker  DDS-2 Score  ( > 3 = SIGNIFICANT DISTRESS): 2.5  Barriers to Change: None  Learning Challenges : None  Readiness to Learn : Acceptance  Cultural Influences: No        Diabetes Education Assessment/Progress  Nutrition (Incorporating nutritional management into one's lifestyle): Discussion, Instructed, Individual Session, Written Materials Provided, Comprehends Key Points  Pt currently with very poor po intake - reports eating makes her stomach hurt. She describes the pain as "cramping," or "it sends me straight to the bathroom" after eating. From day to day she will make the decision "do I want to eat or do I want to avoid the pain?" She does state that Glucerna shakes do not hurt her stomach; no liquids hurt - she is drinking a lot of water and fruit juice for sustenance. Provided pt with multiple Glucerna samples today - she buys them occasionally if she can afford them. Encouraged her to purchase a generic Whey powder so that she can make shakes at home for less of a cost. Will investigate possible prescription coverage for Boost Glucose or Glucerna shakes. She does think she has lost weight - reports her past weight was 230#; last visit with PCP was 218#; weight today 220#.     Medications (states correct name, dose, onset, peak, duration, side effects & timing of meds): Discussion, Instructed, Individual Session, Written Materials Provided, Comprehends Key Points  Instructed pt to take Lantus at same time every day - lately time has been varying each evening. Instructed her to still take her Novolog when drinking a Glucerna shake, but take half dose (12 units). Reviewed timing and MOA of Lantus and Novolog.    Monitoring (monitoring blood glucose/other parameters & using results): Discussion, Instructed, Individual Session, " Written Materials Provided, Comprehends Key Points  She admits she used to test AC/HS, but since she is not eating much lately, she is only testing prior to Novolog injections. She mentions seeing ads for Freestyle Susie CGM - instructed her to resume testing BG AC/HS (4x/day) and keep written logs, and Susie will likely be covered by [facility] Medicare. Discussed basic use of CGM.     Acute Complications (preventing, detecting, and treating acute complications): Discussion, Individual Session, Comprehends Key Points  No reports of hypoglycemia symptoms. Discussed hypoglycemia vs hyperglycemia symptoms and discussed appropriate treatments for each. Reviewed that pt is at high risk of hypo with current medication regimen. Discussed general vs severe hyperglycemia and risk of DKA.    Cognitive (knowledge of self-management skills, functional health literacy): Individual Session  Arrives with general health management knowledge - some specific knowledge of diabetes management. Leaves with increased knowledge base - will benefit from f/u in 1 month.          Goals  Patient has selected/evaluated goals during today's session: Yes, selected    Healthy Eating: Set (Purchase a whey/protein powder to make protein shakes at home in order to keep up PO intake)  Start Date: 10/11/18         Diabetes Care Plan/Intervention  Education Plan/Intervention: Individual Follow-Up DSMT (1 mo f/u scheduled)         Today's Self-Management Care Plan was developed with the patient's input and is based on barriers identified during today's assessment.    The long and short-term goals in the care plan were written with the patient/caregiver's input. The patient has agreed to work toward these goals to improve her overall diabetes control.      The patient received a copy of today's self-management plan and verbalized understanding of the care plan, goals, and all of today's instructions.      The patient was encouraged to communicate with her  physician and care team regarding her condition(s) and treatment.  I provided the patient with my contact information today and encouraged her to contact me via phone or patient portal as needed.         Education Units of Time   Time Spent: 60 min

## 2018-10-15 DIAGNOSIS — E11.42 TYPE 2 DIABETES MELLITUS WITH POLYNEUROPATHY: Primary | ICD-10-CM

## 2018-10-17 RX ORDER — OFLOXACIN 3 MG/ML
1 SOLUTION/ DROPS OPHTHALMIC 3 TIMES DAILY
Qty: 5 ML | Refills: 1 | Status: SHIPPED | OUTPATIENT
Start: 2018-10-17 | End: 2018-11-16

## 2018-10-17 RX ORDER — PREDNISOLONE ACETATE 10 MG/ML
1 SUSPENSION/ DROPS OPHTHALMIC 3 TIMES DAILY
Qty: 5 ML | Refills: 1 | Status: SHIPPED | OUTPATIENT
Start: 2018-10-17 | End: 2018-11-16

## 2018-10-17 NOTE — TELEPHONE ENCOUNTER
Spoke to pt and confirmed surgery with Dr Valle on 10/31/18.  Reminded pt to get preop eyedrops to use 2 days before surgery.  Advised pt that someone needs to bring pt to the surgery, stay during the surgery and drive them home.  Reminded pt that someone from the office will call them the day before surgery regarding an arrival time.   Pt understood. Pt stated she needed preop eyedrops called in to her pharmacy so request was sent to Dr Valle.  Pt also misplaced her paperwork so sent a message to Larry to have her mail a new copy to pt.

## 2018-10-18 ENCOUNTER — OFFICE VISIT (OUTPATIENT)
Dept: INTERNAL MEDICINE | Facility: CLINIC | Age: 78
End: 2018-10-18
Payer: MEDICARE

## 2018-10-18 VITALS
SYSTOLIC BLOOD PRESSURE: 130 MMHG | HEART RATE: 74 BPM | HEIGHT: 68 IN | BODY MASS INDEX: 33.19 KG/M2 | OXYGEN SATURATION: 99 % | WEIGHT: 219 LBS | DIASTOLIC BLOOD PRESSURE: 70 MMHG

## 2018-10-18 DIAGNOSIS — K57.92 DIVERTICULITIS: Primary | ICD-10-CM

## 2018-10-18 DIAGNOSIS — K21.9 GASTROESOPHAGEAL REFLUX DISEASE WITHOUT ESOPHAGITIS: ICD-10-CM

## 2018-10-18 DIAGNOSIS — I10 ESSENTIAL HYPERTENSION: Chronic | ICD-10-CM

## 2018-10-18 PROCEDURE — 99212 OFFICE O/P EST SF 10 MIN: CPT | Mod: PBBFAC | Performed by: INTERNAL MEDICINE

## 2018-10-18 PROCEDURE — 3078F DIAST BP <80 MM HG: CPT | Mod: CPTII,,, | Performed by: INTERNAL MEDICINE

## 2018-10-18 PROCEDURE — 1101F PT FALLS ASSESS-DOCD LE1/YR: CPT | Mod: CPTII,,, | Performed by: INTERNAL MEDICINE

## 2018-10-18 PROCEDURE — 99214 OFFICE O/P EST MOD 30 MIN: CPT | Mod: S$PBB,,, | Performed by: INTERNAL MEDICINE

## 2018-10-18 PROCEDURE — 96372 THER/PROPH/DIAG INJ SC/IM: CPT | Mod: PBBFAC

## 2018-10-18 PROCEDURE — 99999 PR PBB SHADOW E&M-EST. PATIENT-LVL II: CPT | Mod: PBBFAC,,, | Performed by: INTERNAL MEDICINE

## 2018-10-18 PROCEDURE — 3075F SYST BP GE 130 - 139MM HG: CPT | Mod: CPTII,,, | Performed by: INTERNAL MEDICINE

## 2018-10-18 RX ORDER — AMOXICILLIN AND CLAVULANATE POTASSIUM 875; 125 MG/1; MG/1
1 TABLET, FILM COATED ORAL 2 TIMES DAILY
Qty: 28 TABLET | Refills: 0 | Status: SHIPPED | OUTPATIENT
Start: 2018-10-18 | End: 2018-11-01

## 2018-10-18 RX ORDER — CEFTRIAXONE 1 G/1
1 INJECTION, POWDER, FOR SOLUTION INTRAMUSCULAR; INTRAVENOUS
Status: COMPLETED | OUTPATIENT
Start: 2018-10-18 | End: 2018-10-18

## 2018-10-18 RX ORDER — OXYCODONE AND ACETAMINOPHEN 10; 325 MG/1; MG/1
1 TABLET ORAL
Qty: 150 TABLET | Refills: 0 | Status: SHIPPED | OUTPATIENT
Start: 2018-10-18 | End: 2018-11-19 | Stop reason: SDUPTHER

## 2018-10-18 RX ADMIN — CEFTRIAXONE 1 G: 1 INJECTION, POWDER, FOR SOLUTION INTRAMUSCULAR; INTRAVENOUS at 02:10

## 2018-10-18 NOTE — PROGRESS NOTES
CHIEF COMPLAINT: Follow up of dizziness, back pain, hypertension, diabetes     HISTORY OF PRESENT ILLNESS: This is a 78-year-old woman who presents for follow up of above.     She continues to have lower mid abdominal pain. She is taking miralax 1 capful daily. She is having a daily BM   She has a slight stomach ache every times she eat.  She drinks ensure twice daily which helps her stomach.  No nausea or vomiting.  She feels that the pain is the same.  NO fever or chills.     She is taking Cymbalta 60 mg daily, Oxycodone apap 10/325 3-4 times daily, Pantoprazole 40 mg once daily, carvedilol 25 mg twice daily, Vitamin D3 2000 units daily, Loratadine 10 mg daily as needed, Aspirin 81 mg daily, Furosemide 40 mg once daily, potassium 10 meq twice daily.  She feels that the potassium is helping her energy level.      Breathing has been ok. She has not needed her albuterol breathing treatments lately. No mucous production.  No fever or chills.      Mood has been worse lately. She lives alone. Money has been tight. NO homicidal or suicidal ideations. She tried Wellbutrin - she states it caused nausea vomiting and diarrhea so she stopped it. She continues to take CYmabalta 60 mg daily.      She has not felt well - she has not been eating much and has not been taking Novolog.   She is taking Lantus 60 units in the evening.  Sugars have been up and down. NO low blood sugars. Home health has been coming out to help with medication compliance.       Heartburn is controlled. She is taking pantoprazole 40 mg once daily .     She continues to have low back pain. She states her back has been worse.  She saw Dr Le 6/5/18 and had MRI lumbar spine 6/12/18 - Moderate degenerative change at L4-5.  Dr Le recommended epidural - she is not sure if she wants to do the epidural. She takes oxycodone apap 10/325 one tablet 3-5 times daily depending on her pain. SHe takes Cymbalta 60 mg daily which helps her pain and her mood. She  "went to [place] this past weekend which she enjoyed.  Her back is a little worse due to the walking.        She continues to take aspirin 81 mg daily, coreg 25 mg twice daily and lasix 40 mg twice daily for her diastolic dysfunction and hypertension. She denies lightheadedness. No chest pain or shortness of breath. She is not taking atorvastatin 40 mg once daily for her cholesterol.      Today she states her grandson is living with her for the last month  He is 21 years old. He is from [place] to look for a job. She has to fuss with him to keep him in line.   .          PAST MEDICAL HISTORY:   1. Diabetes mellitus   2. Hypertension   3. Hyperlipidemia   4. Left heart catheterization January 2007 which revealed luminal irregularities in the LAD, left circumflex and right coronary artery. She had diastolic dysfunction and patent renal arteries.   5. Sleep apnea   6. Obesity.   7. Nephrolithiasis status post lithotripsy.   8. Reflux - had nonerosive gastropathy on EGD September 2007. Gastritis on EGD 9/2010  9. Hidradenitis suppurativa.   10. History of diverticulitis with a hospitalized October 2007.   11. Migraines   12. Obesity.   13. 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    14. Total hysterectomy in 1969.   15. Cholecystectomy was done at that time as well.   16. 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   17. Colon polyps on colonscopy 11/2010 - due 2013   18. Hospitalization 12/10 for e coli sepsis due to left ureteral stone with left nephrostomy tube in [place], [place]   19. Left knee replacement 9/2012      MEDICATIONS and ALLERGIES: Updated on EPIC.     PHYSICAL EXAMINATION:      /70   Pulse 74   Ht 5' 8" (1.727 m)   Wt 99.3 kg (219 lb)   LMP  (LMP Unknown)   SpO2 99%   BMI 33.30 kg/m²      GENERAL: She is alert, oriented, no apparent distress. Affect within   normal limits.  Conjunctiva anicteric. . Oropharynx " clear.   NECK: Supple.   Respiratory: Effort normal. Lungs clear  HEART: Regular rate and rhythm without murmurs, gallops or rubs.   No lower extremity edema.  .  Gait normal.      ASSESSMENT AND PLAN:   1. Abdominal pain - - constipation is better and still having pain. Wonder if diverticulitis. Augmentin 875m mg twice daily for 14 days. Rocephin 1 gram IM today    2. Social issues- suspect not taking medication regularly by looking at medication bottles at last visit - home health for education of medications, disease processes and help with compliance with medications.  Case management referral placed last visit - they could not get intouch with her.  She got a new phone number since our last visit. Will place another referral. Stressed that she get her family to help her.   2. Mood disorder - continue cymbalta for now. To increase social supports.     3. neck, shoulder and back pain - home health physical therapy   4. Asthma - stable   6. HTN -  monitor BP at home. Decrease furosemide to once daily as she is not eating well  7. Gerd - stable pantoprazole to 40 mg daily   8. Diabetes with neuropathy- lwatch sugars  9. Hyperlipidemia - off lipitor  10. Allergic rhinitis - stable  11. Diastolic congestive heart failure - stable.    12. CRI -stable  13. Obesity - discussed diet, exercise and weight loss  14. CAD -risk factor modification  15. Aortic atherosclerosis and possible mesenteric artery stenosis- risk factor modification  16. Tortuous aorta - HTN controlled  17. Colon polyps - Colonoscopy in 8/2013 - 2 polyps. Flex sig 11/2013 - mild granular mucosa.  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 Attempted colonoscopy 11/2016 du to diarrhea which has resolved. She had poor prep with hyperplastic rectal biopsy.   MMG 1/18  Prevnar 10/15 and flu shot 11/16  I will see her back in 2 weeks sooner if issues. .

## 2018-10-30 ENCOUNTER — TELEPHONE (OUTPATIENT)
Dept: OPTOMETRY | Facility: CLINIC | Age: 78
End: 2018-10-30

## 2018-10-31 ENCOUNTER — HOSPITAL ENCOUNTER (OUTPATIENT)
Facility: OTHER | Age: 78
Discharge: HOME | End: 2018-10-31
Attending: OPHTHALMOLOGY | Admitting: OPHTHALMOLOGY
Payer: MEDICARE

## 2018-10-31 VITALS
OXYGEN SATURATION: 94 % | DIASTOLIC BLOOD PRESSURE: 83 MMHG | SYSTOLIC BLOOD PRESSURE: 170 MMHG | BODY MASS INDEX: 32.89 KG/M2 | HEIGHT: 68 IN | WEIGHT: 217 LBS | RESPIRATION RATE: 16 BRPM | HEART RATE: 67 BPM | TEMPERATURE: 98 F

## 2018-10-31 DIAGNOSIS — H25.11 NUCLEAR SCLEROTIC CATARACT OF RIGHT EYE: Primary | ICD-10-CM

## 2018-10-31 LAB — POCT GLUCOSE: 143 MG/DL (ref 70–110)

## 2018-10-31 PROCEDURE — 27201423 OPTIME MED/SURG SUP & DEVICES STERILE SUPPLY: Performed by: OPHTHALMOLOGY

## 2018-10-31 PROCEDURE — 25000003 PHARM REV CODE 250: Performed by: OPHTHALMOLOGY

## 2018-10-31 PROCEDURE — 36000706: Performed by: OPHTHALMOLOGY

## 2018-10-31 PROCEDURE — 66982 XCAPSL CTRC RMVL CPLX WO ECP: CPT | Mod: RT,,, | Performed by: OPHTHALMOLOGY

## 2018-10-31 PROCEDURE — 00142 ANES PX ON EYE LENS SURGERY: CPT | Performed by: OPHTHALMOLOGY

## 2018-10-31 PROCEDURE — 36000707: Performed by: OPHTHALMOLOGY

## 2018-10-31 PROCEDURE — 71000015 HC POSTOP RECOV 1ST HR: Performed by: OPHTHALMOLOGY

## 2018-10-31 PROCEDURE — 63600175 PHARM REV CODE 636 W HCPCS: Performed by: NURSE ANESTHETIST, CERTIFIED REGISTERED

## 2018-10-31 PROCEDURE — 25000003 PHARM REV CODE 250: Performed by: NURSE ANESTHETIST, CERTIFIED REGISTERED

## 2018-10-31 PROCEDURE — 37000008 HC ANESTHESIA 1ST 15 MINUTES: Performed by: OPHTHALMOLOGY

## 2018-10-31 PROCEDURE — 82962 GLUCOSE BLOOD TEST: CPT | Performed by: OPHTHALMOLOGY

## 2018-10-31 PROCEDURE — A4216 STERILE WATER/SALINE, 10 ML: HCPCS | Performed by: NURSE ANESTHETIST, CERTIFIED REGISTERED

## 2018-10-31 PROCEDURE — 37000009 HC ANESTHESIA EA ADD 15 MINS: Performed by: OPHTHALMOLOGY

## 2018-10-31 PROCEDURE — V2632 POST CHMBR INTRAOCULAR LENS: HCPCS | Performed by: OPHTHALMOLOGY

## 2018-10-31 DEVICE — LENS IOL ITEC PRELOAD 22.5D: Type: IMPLANTABLE DEVICE | Site: EYE | Status: FUNCTIONAL

## 2018-10-31 RX ORDER — TROPICAMIDE 10 MG/ML
1 SOLUTION/ DROPS OPHTHALMIC
Status: DISCONTINUED | OUTPATIENT
Start: 2018-10-31 | End: 2018-10-31 | Stop reason: SDUPTHER

## 2018-10-31 RX ORDER — LIDOCAINE HYDROCHLORIDE 10 MG/ML
INJECTION, SOLUTION EPIDURAL; INFILTRATION; INTRACAUDAL; PERINEURAL
Status: DISCONTINUED | OUTPATIENT
Start: 2018-10-31 | End: 2018-10-31 | Stop reason: HOSPADM

## 2018-10-31 RX ORDER — SODIUM CHLORIDE 0.9 % (FLUSH) 0.9 %
SYRINGE (ML) INJECTION
Status: DISCONTINUED | OUTPATIENT
Start: 2018-10-31 | End: 2018-10-31

## 2018-10-31 RX ORDER — TROPICAMIDE 10 MG/ML
1 SOLUTION/ DROPS OPHTHALMIC
Status: DISCONTINUED | OUTPATIENT
Start: 2018-10-31 | End: 2018-10-31 | Stop reason: HOSPADM

## 2018-10-31 RX ORDER — LIDOCAINE HYDROCHLORIDE 40 MG/ML
INJECTION, SOLUTION RETROBULBAR
Status: DISCONTINUED | OUTPATIENT
Start: 2018-10-31 | End: 2018-10-31 | Stop reason: HOSPADM

## 2018-10-31 RX ORDER — MOXIFLOXACIN 5 MG/ML
1 SOLUTION/ DROPS OPHTHALMIC
Status: DISCONTINUED | OUTPATIENT
Start: 2018-10-31 | End: 2018-10-31 | Stop reason: HOSPADM

## 2018-10-31 RX ORDER — MOXIFLOXACIN 5 MG/ML
1 SOLUTION/ DROPS OPHTHALMIC
Status: DISCONTINUED | OUTPATIENT
Start: 2018-10-31 | End: 2018-10-31 | Stop reason: SDUPTHER

## 2018-10-31 RX ORDER — TETRACAINE HYDROCHLORIDE 5 MG/ML
1 SOLUTION OPHTHALMIC
Status: COMPLETED | OUTPATIENT
Start: 2018-10-31 | End: 2018-10-31

## 2018-10-31 RX ORDER — PHENYLEPHRINE HYDROCHLORIDE 25 MG/ML
1 SOLUTION/ DROPS OPHTHALMIC
Status: DISCONTINUED | OUTPATIENT
Start: 2018-10-31 | End: 2018-10-31 | Stop reason: HOSPADM

## 2018-10-31 RX ORDER — LIDOCAINE HYDROCHLORIDE 10 MG/ML
1 INJECTION, SOLUTION EPIDURAL; INFILTRATION; INTRACAUDAL; PERINEURAL ONCE
Status: DISCONTINUED | OUTPATIENT
Start: 2018-10-31 | End: 2018-10-31 | Stop reason: HOSPADM

## 2018-10-31 RX ORDER — MIDAZOLAM HYDROCHLORIDE 1 MG/ML
INJECTION INTRAMUSCULAR; INTRAVENOUS
Status: DISCONTINUED | OUTPATIENT
Start: 2018-10-31 | End: 2018-10-31

## 2018-10-31 RX ORDER — TETRACAINE HYDROCHLORIDE 5 MG/ML
1 SOLUTION OPHTHALMIC
Status: DISCONTINUED | OUTPATIENT
Start: 2018-10-31 | End: 2023-04-19

## 2018-10-31 RX ORDER — MOXIFLOXACIN 5 MG/ML
1 SOLUTION/ DROPS OPHTHALMIC
Status: COMPLETED | OUTPATIENT
Start: 2018-10-31 | End: 2018-10-31

## 2018-10-31 RX ORDER — ACETAMINOPHEN 325 MG/1
650 TABLET ORAL EVERY 4 HOURS PRN
Status: DISCONTINUED | OUTPATIENT
Start: 2018-10-31 | End: 2018-10-31 | Stop reason: HOSPADM

## 2018-10-31 RX ORDER — PHENYLEPHRINE HYDROCHLORIDE 25 MG/ML
1 SOLUTION/ DROPS OPHTHALMIC
Status: DISCONTINUED | OUTPATIENT
Start: 2018-10-31 | End: 2018-10-31 | Stop reason: SDUPTHER

## 2018-10-31 RX ORDER — PROPARACAINE HYDROCHLORIDE 5 MG/ML
1 SOLUTION/ DROPS OPHTHALMIC
Status: DISCONTINUED | OUTPATIENT
Start: 2018-10-31 | End: 2018-10-31 | Stop reason: HOSPADM

## 2018-10-31 RX ORDER — LIDOCAINE HYDROCHLORIDE 10 MG/ML
1 INJECTION, SOLUTION EPIDURAL; INFILTRATION; INTRACAUDAL; PERINEURAL ONCE
Status: DISCONTINUED | OUTPATIENT
Start: 2018-10-31 | End: 2018-10-31 | Stop reason: SDUPTHER

## 2018-10-31 RX ORDER — TETRACAINE HYDROCHLORIDE 5 MG/ML
1 SOLUTION OPHTHALMIC
Status: DISCONTINUED | OUTPATIENT
Start: 2018-10-31 | End: 2018-10-31 | Stop reason: SDUPTHER

## 2018-10-31 RX ORDER — MOXIFLOXACIN 5 MG/ML
SOLUTION/ DROPS OPHTHALMIC
Status: DISCONTINUED | OUTPATIENT
Start: 2018-10-31 | End: 2018-10-31 | Stop reason: HOSPADM

## 2018-10-31 RX ADMIN — MOXIFLOXACIN 1 DROP: 5 SOLUTION/ DROPS OPHTHALMIC at 08:10

## 2018-10-31 RX ADMIN — PHENYLEPHRINE HYDROCHLORIDE 1 DROP: 25 SOLUTION/ DROPS OPHTHALMIC at 07:10

## 2018-10-31 RX ADMIN — TETRACAINE HYDROCHLORIDE 1 DROP: 5 SOLUTION OPHTHALMIC at 07:10

## 2018-10-31 RX ADMIN — MOXIFLOXACIN 1 DROP: 5 SOLUTION/ DROPS OPHTHALMIC at 07:10

## 2018-10-31 RX ADMIN — SODIUM CHLORIDE, PRESERVATIVE FREE 10 ML: 5 INJECTION INTRAVENOUS at 08:10

## 2018-10-31 RX ADMIN — TROPICAMIDE 1 DROP: 10 SOLUTION/ DROPS OPHTHALMIC at 07:10

## 2018-10-31 RX ADMIN — MIDAZOLAM HYDROCHLORIDE 2 MG: 1 INJECTION, SOLUTION INTRAMUSCULAR; INTRAVENOUS at 08:10

## 2018-10-31 NOTE — ANESTHESIA POSTPROCEDURE EVALUATION
"Anesthesia Post Evaluation    Patient: Henrietta Villasenor    Procedure(s) Performed: Procedure(s) (LRB):  PHACOEMULSIFICATION-ASPIRATION-CATARACT (Right)  INSERTION-INTRAOCULAR LENS (IOL) (Right)    Final Anesthesia Type: MAC  Patient location during evaluation: [facility]  Patient participation: Yes- Able to Participate  Level of consciousness: awake and alert and awake  Post-procedure vital signs: reviewed and stable  Pain management: adequate  Airway patency: patent  PONV status at discharge: No PONV  Anesthetic complications: no      Cardiovascular status: blood pressure returned to baseline, hemodynamically stable and stable  Respiratory status: unassisted, spontaneous ventilation and room air  Hydration status: euvolemic  Follow-up not needed.        Visit Vitals  BP (!) 173/93 (BP Location: Left arm, Patient Position: Sitting)   Pulse 69   Temp 36.6 °C (97.9 °F) (Oral)   Resp 16   Ht 5' 8" (1.727 m)   Wt 98.4 kg (217 lb)   LMP  (LMP Unknown)   SpO2 99%   Breastfeeding? No   BMI 32.99 kg/m²       Pain/Annette Score: Pain Assessment Performed: Yes (10/31/2018  7:28 AM)  Presence of Pain: denies (10/31/2018  7:28 AM)        "

## 2018-10-31 NOTE — PLAN OF CARE
Henrietta Villasenor has met all discharge criteria from Phase II. Vital Signs are stable, ambulating  without difficulty. Discharge instructions given, patient verbalized understanding. Discharged from facility via wheelchair in stable condition.

## 2018-10-31 NOTE — H&P
History    Chief complaint:  Painless progressive vision loss    Present Ilness/Diagnosis: Nuclear sclerotic Cataract    Past Medical History:  has a past medical history of Abnormality of lung (11/08/2011), Anxiety, Arthritis, CAD (coronary artery disease), Calculus of ureter (2/22/2011), Cataract, CHF (congestive heart failure), Chronic back pain (7/29/2012), Colon polyp (9/2010; 11/2010), Colon polyps (7/29/2012), Coronary artery disease involving native coronary artery of native heart with angina pectoris, Depression, Diabetes mellitus, Diabetes mellitus type II, Diverticulitis large intestine (7/27/2015), Diverticulosis (09/25/2010; 11/02/2010; 11/08/2011; 7/29/2012), Duodenal disorder (08/25/2011), Duodenal ulcer, unspecified as acute or chronic, without hemorrhage, perforation, or obstruction (8/24/2011), E. 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), 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), Obesity, MARLON (obstructive sleep apnea), Pain, Peripheral neuropathy, Pneumonia, Polyneuropathy, Postherpetic neuralgia, Recurrent nephrolithiasis, S/P knee replacement (10/2/2012), S/P TKR (total knee replacement) (12/26/2012), Sensorineural hearing loss of both ears, Thyroid disease (11/08/2011), Trouble in sleeping, Type II or unspecified type diabetes mellitus with neurological manifestations, not stated as uncontrolled(250.60), Type II or unspecified type diabetes  mellitus with peripheral circulatory disorders, not stated as uncontrolled(250.70), and Type II or unspecified type diabetes mellitus with renal manifestations, not stated as uncontrolled(250.40).    Family History/Social History: refer to chart    Allergies:   Review of patient's allergies indicates:   Allergen Reactions    Crestor [rosuvastatin]      Cramping in legs    Ezetimibe      Other reaction(s): abdominal pain, Diarrhea    Hydrocodone      Other reaction(s): Itching    Lisinopril      Other reaction(s): cough      Sulfa (sulfonamide antibiotics)      Itching and Rash      Sulfamethoxazole     Sulfamethoxazole-trimethoprim     Trimethoprim     Valsartan      Other reaction(s): Angioedema       Current Medications: No current facility-administered medications for this encounter.     Current Outpatient Medications:     albuterol (PROVENTIL) 2.5 mg /3 mL (0.083 %) nebulizer solution, Take 3 mLs (2.5 mg total) by nebulization every 6 (six) hours as needed for Wheezing. Rescue, Disp: 90 mL, Rfl: 6    albuterol (VENTOLIN HFA) 90 mcg/actuation inhaler, Inhale 2 puffs by mouth into the lungs every 4 (four) hours as needed for Wheezing. Rescue, Disp: 18 g, Rfl: 1    amoxicillin-clavulanate 875-125mg (AUGMENTIN) 875-125 mg per tablet, Take 1 tablet by mouth 2 (two) times daily. for 14 days, Disp: 28 tablet, Rfl: 0    aspirin (ECOTRIN) 81 MG EC tablet, Take 1 tablet by mouth Daily., Disp: , Rfl:     carvedilol (COREG) 25 MG tablet, TAKE 1 TABLET TWICE DAILY, Disp: 180 tablet, Rfl: 3    cholecalciferol, vitamin D3, (VITAMIN D) 2,000 unit Cap, Take by mouth. 1 Capsule Oral Every morning, Disp: , Rfl:     duloxetine (CYMBALTA) 60 MG capsule, Take 1 capsule (60 mg total) by mouth once daily., Disp: 30 capsule, Rfl: 11    fluticasone (FLONASE) 50 mcg/actuation nasal spray, SHAKE LQ AND U 2 SPRAYS IEN QD, Disp: 1 Bottle, Rfl: 6    furosemide (LASIX) 40 MG tablet, TAKE 1 TABLET twice daily (Patient taking  "differently: TAKE 1 TABLETonce daily), Disp: 270 tablet, Rfl: 3    insulin aspart (NOVOLOG) 100 unit/mL InPn pen, 23 units with meals plus correction scale. Max daily dose=120 units (Patient taking differently: Inject 23 Units into the skin 3 (three) times daily with meals. 23 units with meals plus correction scale. Max daily dose=120 units), Disp: 8 Box, Rfl: 3    insulin glargine (LANTUS SOLOSTAR) 100 unit/mL (3 mL) InPn pen, Inject 60 Units into the skin every evening., Disp: 4 Box, Rfl: 3    Lactobacillus acidophilus (PROBIOTIC) 10 billion cell Cap, Take 1 capsule by mouth once daily., Disp: , Rfl:     loratadine (CLARITIN) 10 mg tablet, TAKE 1 TABLET EVERY DAY, Disp: 90 tablet, Rfl: 4    nitroGLYCERIN (NITROSTAT) 0.4 MG SL tablet, Place 1 tablet (0.4 mg total) under the tongue every 5 (five) minutes as needed., Disp: 25 tablet, Rfl: 2    pantoprazole (PROTONIX) 40 MG tablet, TAKE 1 TABLET(40 MG) BY MOUTH twice daily, Disp: 180 tablet, Rfl: 4    potassium chloride SA (K-DUR,KLOR-CON) 10 MEQ tablet, Take 1 tablet (10 mEq total) by mouth 2 (two) times daily., Disp: 60 tablet, Rfl: 11    ACCU-CHEK FASTCLIX Misc, , Disp: , Rfl:     ACCU-CHEK SMARTVIEW TEST STRIP Strp, TEST THREE TIMES DAILY, Disp: 300 strip, Rfl: 3    ALCOHOL ANTISEPTIC PADS (ALCOHOL PREP PADS TOP), , Disp: , Rfl:     BD ULTRA-FINE OLU PEN NEEDLES 32 x 5/32 " Ndle, Uses 4 times daily, on multiple daily insulin injections, Disp: 130 each, Rfl: 12    MUSCLE RUB, WITH CAMPHOR, 4-30-10 % Crea, , Disp: , Rfl:     ofloxacin (OCUFLOX) 0.3 % ophthalmic solution, Place 1 drop into the right eye 3 (three) times daily., Disp: 5 mL, Rfl: 1    oxyCODONE-acetaminophen (PERCOCET)  mg per tablet, Take 1 tablet by mouth 5 (five) times daily., Disp: 150 tablet, Rfl: 0    pen needle, diabetic (SURE-FINE PEN NEEDLES) 29 gauge x 1/2" Ndle, Use 4 times daily, Disp: 400 each, Rfl: 4    prednisoLONE acetate (PRED FORTE) 1 % DrpS, Place 1 drop into " the right eye 3 (three) times daily., Disp: 5 mL, Rfl: 1    triamcinolone acetonide 0.1% (KENALOG) 0.1 % cream, Apply topically 3 (three) times daily., Disp: 28.4 g, Rfl: 0    Physical Exam    BP: Vital signs stable  General: No apparent distress  HEENT: nuclear sclerotic cataract  Lungs: adequate respirations  Heart: + pulses  Abdomen: soft  Rectal/pelvic: deferred    Impression: Visually significant Cataract    Plan: Phacoemulsification with implantation of Intraocular lens

## 2018-10-31 NOTE — OP NOTE
DATE OF PROCEDURE: 10/31/2018    SURGEON: BLOSSOM ZAFAR MD    PREOPERATIVE DIAGNOSIS:  1. Senile nuclear sclerotic cataract right eye. 2. 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 2. 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    IMPLANT:  PCBOO 22.5    ANESTHESIA:  Topical and MAC    COMPLICATIONS: none    ESTIMATED BLOOD LOSS: <1cc    SPECIMENS: none    INDICATIONS FOR PROCEDURE:  This patient presented to the clinic with decreased vision in the right eye and was found to have a cataract.  The risks, benefits, and alternatives were discussed and the patient agreed to proceed with phacoemulsification and implantation of a lens in the right eye.     PROCEDURE IN DETAIL:  The patient was met in the preop holding area.  Consent was confirmed to be signed.  The operative site was marked.  The patient was brought into the operating room by the anesthesia team and placed under monitored anesthesia care.  The right eye was prepped and draped in a sterile ophthalmic fashion.  A Gerri speculum was placed into the right eye.   A paracentesis site was made and 1% preservative-free lidocaine was injected into the anterior chamber.  Viscoelastic  material was injected into the anterior chamber.  A keratome blade was used to make a clear corneal incision. The malyugin ring was inserted and positioned into place, assisting with pupillary dilation.  A cystotome was used to initiate the continuous curvilinear capsulorrhexis which was completed with Utrata forceps.  BSS on a bruce cannula was used to perform hydrodissection.  The phacoemulsification tip was introduced into the eye and the nucleus was removed in a standard divide-and-conquer fashion.  Remaining cortical material was removed  from the eye using irrigation-aspiration.  The capsular bag was filled with viscoelastic material and the intraocular lens was injected and positioned into place. The Malyugin ring was removed from the eye. Remaining viscoelastic material was removed from the eye using irrigation and aspiration.  The corneal wounds were hydrated.  The eye was filled to physiologic pressure. The wounds were found to be watertight. Drops of Vigamox and prednisilone were placed into the eye.  The eye was washed, dried, and shielded.  The patient tolerated the procedure well and knows to follow up with me tomorrow morning, sooner if needed.

## 2018-10-31 NOTE — ANESTHESIA PREPROCEDURE EVALUATION
10/31/2018  Henrietta Villasenor is a 78 y.o., female.    Anesthesia Evaluation    I have reviewed the Patient Summary Reports.     I have reviewed the Medications.     Review of Systems  Anesthesia Hx:  No problems with previous Anesthesia Denies Hx of Anesthetic complications  History of prior surgery of interest to airway management or planning: Previous anesthesia: MAC  3/18 phaco, rec'd versed 2mg, fent 75 gwendolyn, did well with MAC.   Denies Personal Hx of Anesthesia complications.   Social:  Former Smoker, No Alcohol Use    Hematology/Oncology:  Hematology Normal   Oncology Normal     Cardiovascular:   Hypertension Past MI CAD asymptomatic   Denies Angina. CHF hyperlipidemia    Pulmonary:   Denies Shortness of breath. Sleep Apnea, CPAP    Renal/:   Chronic Renal Disease    Hepatic/GI:   PUD, GERD, well controlled Liver Disease,    Neurological:   Headaches Facial droop  Peripheral Neuropathy    Endocrine:   Diabetes    Psych:   depression          Physical Exam  General:  Obesity    Airway/Jaw/Neck:  Airway Findings: Mouth Opening: Normal Tongue: Large  General Airway Assessment: Adult  Mallampati: IV  TM Distance: Normal, at least 6 cm  Jaw/Neck Findings:  Neck ROM: Normal ROM      Dental:  Dental Findings: Edentulous        Mental Status:  Mental Status Findings:  Cooperative, Alert and Oriented         Anesthesia Plan  Type of Anesthesia, risks & benefits discussed:  Anesthesia Type:  MAC  Patient's Preference:   Intra-op Monitoring Plan: standard ASA monitors  Intra-op Monitoring Plan Comments:   Post Op Pain Control Plan:   Post Op Pain Control Plan Comments:   Induction:    Beta Blocker:         Informed Consent: Patient understands risks and agrees with Anesthesia plan.  Questions answered. Anesthesia consent signed with patient.  ASA Score: 3     Day of Surgery Review of History & Physical:    H&P  update referred to the surgeon.         Ready For Surgery From Anesthesia Perspective.

## 2018-11-01 ENCOUNTER — OFFICE VISIT (OUTPATIENT)
Dept: OPHTHALMOLOGY | Facility: CLINIC | Age: 78
End: 2018-11-01
Payer: MEDICARE

## 2018-11-01 DIAGNOSIS — Z96.1 STATUS POST CATARACT EXTRACTION AND INSERTION OF INTRAOCULAR LENS, RIGHT: Primary | ICD-10-CM

## 2018-11-01 DIAGNOSIS — Z96.1 STATUS POST CATARACT EXTRACTION AND INSERTION OF INTRAOCULAR LENS, LEFT: ICD-10-CM

## 2018-11-01 DIAGNOSIS — Z98.41 STATUS POST CATARACT EXTRACTION AND INSERTION OF INTRAOCULAR LENS, RIGHT: Primary | ICD-10-CM

## 2018-11-01 DIAGNOSIS — Z98.42 STATUS POST CATARACT EXTRACTION AND INSERTION OF INTRAOCULAR LENS, LEFT: ICD-10-CM

## 2018-11-01 PROCEDURE — 99024 POSTOP FOLLOW-UP VISIT: CPT | Mod: S$GLB,,, | Performed by: OPHTHALMOLOGY

## 2018-11-01 PROCEDURE — 99213 OFFICE O/P EST LOW 20 MIN: CPT | Mod: PBBFAC | Performed by: OPHTHALMOLOGY

## 2018-11-01 PROCEDURE — 99999 PR PBB SHADOW E&M-EST. PATIENT-LVL III: CPT | Mod: PBBFAC,,, | Performed by: OPHTHALMOLOGY

## 2018-11-01 NOTE — PROGRESS NOTES
HPI     Referred by Dr Clemens     S/p phaco w/IOL OD 10/31/18  S/p lid sx OS (pt not sure of the date)  S/p phaco OS w/IOL 03/22/18    Gtts:          PF TID OD  Ocuflox TID OD       Pt here for 1 day post op OD.  Pt states she had pain OD last night, but   denies eye pain today OD.    Last edited by Jailyn Durham MA on 11/1/2018 12:08 PM.   (History)            Assessment /Plan     For exam results, see Encounter Report.    Status post cataract extraction and insertion of intraocular lens, right    Status post cataract extraction and insertion of intraocular lens, left      Slit Lamp Exam  L/L - normal  C/s - quiet  Cornea - clear  A/C - 1+ cell  Lens - PCIOL    POD #1 s/p phaco/IOL  - doing well  - continue the following drops:    vigamox or ocuflox TID x 1 wk then stop  Pred forte or durezol or dexamethasone TID x  4 wks  Ketorolac TID until runs out    Versus:    Combination drop - 1 drop TID x total of 1 month    Appropriate precautions and post op medications reviewed.  Patient instructed to call or come in if symptoms of redness, decreased vision, or pain are experienced.    -f/up 4 wks, sooner PRN. mrx if needed, dfe with dr. clemens

## 2018-11-06 DIAGNOSIS — Z79.4 TYPE 2 DIABETES MELLITUS WITH COMPLICATION, WITH LONG-TERM CURRENT USE OF INSULIN: Primary | ICD-10-CM

## 2018-11-06 DIAGNOSIS — E11.8 TYPE 2 DIABETES MELLITUS WITH COMPLICATION, WITH LONG-TERM CURRENT USE OF INSULIN: Primary | ICD-10-CM

## 2018-11-19 ENCOUNTER — OFFICE VISIT (OUTPATIENT)
Dept: INTERNAL MEDICINE | Facility: CLINIC | Age: 78
End: 2018-11-19
Payer: MEDICARE

## 2018-11-19 VITALS
HEIGHT: 68 IN | OXYGEN SATURATION: 98 % | WEIGHT: 222 LBS | DIASTOLIC BLOOD PRESSURE: 80 MMHG | SYSTOLIC BLOOD PRESSURE: 128 MMHG | BODY MASS INDEX: 33.65 KG/M2 | HEART RATE: 67 BPM

## 2018-11-19 DIAGNOSIS — E78.5 HYPERLIPIDEMIA, UNSPECIFIED HYPERLIPIDEMIA TYPE: ICD-10-CM

## 2018-11-19 DIAGNOSIS — E11.42 TYPE 2 DIABETES MELLITUS WITH DIABETIC POLYNEUROPATHY, WITH LONG-TERM CURRENT USE OF INSULIN: ICD-10-CM

## 2018-11-19 DIAGNOSIS — Z79.4 TYPE 2 DIABETES MELLITUS WITH DIABETIC POLYNEUROPATHY, WITH LONG-TERM CURRENT USE OF INSULIN: ICD-10-CM

## 2018-11-19 DIAGNOSIS — K57.92 DIVERTICULITIS: ICD-10-CM

## 2018-11-19 DIAGNOSIS — K21.9 GASTROESOPHAGEAL REFLUX DISEASE WITHOUT ESOPHAGITIS: ICD-10-CM

## 2018-11-19 DIAGNOSIS — I10 ESSENTIAL HYPERTENSION: Primary | Chronic | ICD-10-CM

## 2018-11-19 DIAGNOSIS — M54.9 OTHER CHRONIC BACK PAIN: Chronic | ICD-10-CM

## 2018-11-19 DIAGNOSIS — G89.29 OTHER CHRONIC BACK PAIN: Chronic | ICD-10-CM

## 2018-11-19 PROCEDURE — 3079F DIAST BP 80-89 MM HG: CPT | Mod: CPTII,HCNC,S$GLB, | Performed by: INTERNAL MEDICINE

## 2018-11-19 PROCEDURE — 1101F PT FALLS ASSESS-DOCD LE1/YR: CPT | Mod: CPTII,HCNC,S$GLB, | Performed by: INTERNAL MEDICINE

## 2018-11-19 PROCEDURE — 3074F SYST BP LT 130 MM HG: CPT | Mod: CPTII,HCNC,S$GLB, | Performed by: INTERNAL MEDICINE

## 2018-11-19 PROCEDURE — 99999 PR PBB SHADOW E&M-EST. PATIENT-LVL II: CPT | Mod: PBBFAC,HCNC,, | Performed by: INTERNAL MEDICINE

## 2018-11-19 PROCEDURE — 99214 OFFICE O/P EST MOD 30 MIN: CPT | Mod: HCNC,S$GLB,, | Performed by: INTERNAL MEDICINE

## 2018-11-19 RX ORDER — AMOXICILLIN AND CLAVULANATE POTASSIUM 875; 125 MG/1; MG/1
1 TABLET, FILM COATED ORAL 2 TIMES DAILY
Qty: 20 TABLET | Refills: 0 | Status: SHIPPED | OUTPATIENT
Start: 2018-11-19 | End: 2018-11-29

## 2018-11-19 RX ORDER — OXYCODONE AND ACETAMINOPHEN 10; 325 MG/1; MG/1
1 TABLET ORAL
Qty: 150 TABLET | Refills: 0 | Status: SHIPPED | OUTPATIENT
Start: 2018-11-19 | End: 2018-12-18 | Stop reason: SDUPTHER

## 2018-11-19 NOTE — PROGRESS NOTES
CHIEF COMPLAINT: Follow up of dizziness, back pain, hypertension, diabetes     HISTORY OF PRESENT ILLNESS: This is a 78-year-old woman who presents for follow up of above.    Her abdominal pain resolved after taking a 14 day course of Augmentin.  SHe was pain free and had a good appetite for 2 weeks.  She has started to have the same abdominal pain for the last 3 days. No fever or chills. Appetite is not as good.  NO nausea, vomiting, constipation, diarrhea, dysuria or hematuria. She is taking miralax 1 capful daily as needed (has not needed)     She is taking Cymbalta 60 mg daily, Oxycodone apap 10/325 3-4 times daily, Pantoprazole 40 mg once daily, carvedilol 25 mg twice daily, Vitamin D3 2000 units daily, Loratadine 10 mg daily as needed, Aspirin 81 mg daily, Furosemide 40 mg once daily, potassium 10 meq twice daily.  She feels that the potassium is helping her energy level.      Breathing has been ok. She has not needed her albuterol breathing treatments lately. No mucous production.  No fever or chills.      Mood has been worse lately. She lives alone. Money has been tight. NO homicidal or suicidal ideations. She tried Wellbutrin - she states it caused nausea vomiting and diarrhea so she stopped it. She continues to take CYmabalta 60 mg daily.      She is taking Lantus 60 units in the evening and Novolog 23 units twice daily with meals.  Sugar has been doing ok - 114 this morning. No hypoglycemia.  Home health has been coming out to help with medication compliance.       Heartburn is controlled. She is taking pantoprazole 40 mg once daily .     She continues to have low back pain. She states her back has been worse.  She saw Dr Le 6/5/18 and had MRI lumbar spine 6/12/18 - Moderate degenerative change at L4-5.  Dr Le recommended epidural - she is not sure if she wants to do the epidural. She takes oxycodone apap 10/325 one tablet 3-5 times daily depending on her pain. SHe takes Cymbalta 60 mg daily  "which helps her pain and her mood. [facility].  Her back is a little worse due to the walking.        She continues to take aspirin 81 mg daily, coreg 25 mg twice daily and lasix 40 mg twice daily for her diastolic dysfunction and hypertension. She denies lightheadedness. No chest pain or shortness of breath. She is not taking atorvastatin 40 mg once daily for her cholesterol.      Grandson still is living with her. He got his CDL license and wants to drive trucks.    .          PAST MEDICAL HISTORY:   1. Diabetes mellitus   2. Hypertension   3. Hyperlipidemia   4. Left heart catheterization January 2007 which revealed luminal irregularities in the LAD, left circumflex and right coronary artery. She had diastolic dysfunction and patent renal arteries.   5. Sleep apnea   6. Obesity.   7. Nephrolithiasis status post lithotripsy.   8. Reflux - had nonerosive gastropathy on EGD September 2007. Gastritis on EGD 9/2010  9. Hidradenitis suppurativa.   10. History of diverticulitis with a hospitalized October 2007.   11. Migraines   12. Obesity.   13. 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    14. Total hysterectomy in 1969.   15. Cholecystectomy was done at that time as well.   16. 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   17. Colon polyps on colonscopy 11/2010 - due 2013   18. Hospitalization 12/10 for e coli sepsis due to left ureteral stone with left nephrostomy tube in [place], [place]   19. Left knee replacement 9/2012      MEDICATIONS and ALLERGIES: Updated on EPIC.     PHYSICAL EXAMINATION:    /80   Pulse 67   Ht 5' 8" (1.727 m)   Wt 100.7 kg (222 lb 0.1 oz)   LMP  (LMP Unknown)   SpO2 98%   BMI 33.76 kg/m²      GENERAL: She is alert, oriented, no apparent distress. Affect within   normal limits.  Conjunctiva anicteric. . Oropharynx clear.   NECK: Supple.   Respiratory: Effort " normal. Lungs clear  HEART: Regular rate and rhythm without murmurs, gallops or rubs.   No lower extremity edema.    ABDOMEN: soft, non distended, non tender, bowel sounds present, no hepatosplenomgaly          ASSESSMENT AND PLAN:   1. Diverticulitis- better but returning. ANother course of Augmentin 875m mg twice daily for 14 days.    2. Social issues-  She reports home health comes out twice a week.   3. Mood disorder - continue cymbalta for now.   4. neck, shoulder and back pain - she states she will start exercising.   4. Asthma - stable   6. HTN -  monitor BP at home. Decrease furosemide to once daily as she is not eating well  7. Gerd - stable pantoprazole to 40 mg daily   8. Diabetes with neuropathy- lwatch sugars  9. Hyperlipidemia - off lipitor  10. Allergic rhinitis - stable  11. Diastolic congestive heart failure - stable.    12. CRI -stable  13. Obesity - discussed diet, exercise and weight loss  14. CAD -risk factor modification  15. Aortic atherosclerosis and possible mesenteric artery stenosis- risk factor modification  16. Tortuous aorta - HTN controlled  17. Colon polyps - Colonoscopy in 8/2013 - 2 polyps. Flex sig 11/2013 - mild granular mucosa.  18. hyperparathryodisism -saw nephrology   19. Elevated uric acid level - no signs of gout currently. Will montior for now.    Recommended colonoscopy in 8/2018. Attempted colonoscopy 11/2016 du to diarrhea which has resolved. She had poor prep with hyperplastic rectal biopsy.   MMG 1/18  Prevnar 10/15 and flu shot 11/16  I will see her back in 2 weeks sooner if issues. .

## 2018-11-29 ENCOUNTER — RESEARCH ENCOUNTER (OUTPATIENT)
Dept: RESEARCH | Facility: OTHER | Age: 78
End: 2018-11-29
Payer: MEDICARE

## 2018-11-29 ENCOUNTER — RESEARCH ENCOUNTER (OUTPATIENT)
Dept: RESEARCH | Facility: OTHER | Age: 78
End: 2018-11-29

## 2018-11-29 VITALS
WEIGHT: 217.5 LBS | DIASTOLIC BLOOD PRESSURE: 70 MMHG | HEART RATE: 64 BPM | TEMPERATURE: 97 F | HEIGHT: 68 IN | RESPIRATION RATE: 18 BRPM | BODY MASS INDEX: 32.96 KG/M2 | SYSTOLIC BLOOD PRESSURE: 133 MMHG

## 2018-11-29 DIAGNOSIS — E55.9 HYPOVITAMINOSIS D: ICD-10-CM

## 2018-11-29 DIAGNOSIS — K21.9 GASTROESOPHAGEAL REFLUX DISEASE WITHOUT ESOPHAGITIS: ICD-10-CM

## 2018-11-29 DIAGNOSIS — I10 ESSENTIAL HYPERTENSION: Chronic | ICD-10-CM

## 2018-11-29 DIAGNOSIS — E11.42 DIABETIC POLYNEUROPATHY ASSOCIATED WITH TYPE 2 DIABETES MELLITUS: ICD-10-CM

## 2018-11-29 DIAGNOSIS — G47.33 OSA ON CPAP: Chronic | ICD-10-CM

## 2018-11-29 DIAGNOSIS — N18.30 STAGE 3 CHRONIC KIDNEY DISEASE: ICD-10-CM

## 2018-11-29 DIAGNOSIS — K76.0 FATTY LIVER: ICD-10-CM

## 2018-11-29 DIAGNOSIS — I25.2 HISTORY OF MI (MYOCARDIAL INFARCTION): ICD-10-CM

## 2018-11-29 DIAGNOSIS — Z00.6 RESEARCH STUDY PATIENT: ICD-10-CM

## 2018-11-29 DIAGNOSIS — M48.062 SPINAL STENOSIS OF LUMBAR REGION WITH NEUROGENIC CLAUDICATION: ICD-10-CM

## 2018-11-29 DIAGNOSIS — E11.65 TYPE 2 DIABETES MELLITUS WITH HYPERGLYCEMIA, WITH LONG-TERM CURRENT USE OF INSULIN: Primary | ICD-10-CM

## 2018-11-29 DIAGNOSIS — G44.009 CLUSTER HEADACHE, NOT INTRACTABLE, UNSPECIFIED CHRONICITY PATTERN: ICD-10-CM

## 2018-11-29 DIAGNOSIS — Z79.4 TYPE 2 DIABETES MELLITUS WITH HYPERGLYCEMIA, WITH LONG-TERM CURRENT USE OF INSULIN: Primary | ICD-10-CM

## 2018-11-29 DIAGNOSIS — I77.819 ECTATIC AORTA: ICD-10-CM

## 2018-11-29 DIAGNOSIS — E78.5 HYPERLIPIDEMIA, UNSPECIFIED HYPERLIPIDEMIA TYPE: ICD-10-CM

## 2018-11-29 DIAGNOSIS — E78.00 HYPERCHOLESTEROLEMIA: ICD-10-CM

## 2018-11-29 DIAGNOSIS — H90.3 SENSORINEURAL HEARING LOSS (SNHL) OF BOTH EARS: ICD-10-CM

## 2018-11-29 DIAGNOSIS — I70.0 AORTIC ATHEROSCLEROSIS: ICD-10-CM

## 2018-11-29 DIAGNOSIS — G51.0 FACIAL NERVE PALSY, SECONDARY: Chronic | ICD-10-CM

## 2018-11-29 DIAGNOSIS — E21.3 HYPERPARATHYROIDISM: ICD-10-CM

## 2018-11-29 DIAGNOSIS — E66.9 OBESITY (BMI 30.0-34.9): ICD-10-CM

## 2018-11-29 DIAGNOSIS — I25.119 CORONARY ARTERY DISEASE INVOLVING NATIVE CORONARY ARTERY OF NATIVE HEART WITH ANGINA PECTORIS: ICD-10-CM

## 2018-11-29 PROCEDURE — 99499 UNLISTED E&M SERVICE: CPT | Mod: ,,, | Performed by: INTERNAL MEDICINE

## 2018-11-30 NOTE — PROGRESS NOTES
Date:    29 November 2018  Study:   TACT2 EDTA Chelation Study  IRB / Protocol #:  2016.273.C / [id number]    :  Arya Weathers MD  Site Number:        1348  Subject Number:   [id number]    Consent  The informed consent form (IRB Approved: 02/22/2018) was discussed extensively and ample time was provided for questions and answers, emphasis was placed on the HIPPA, voluntary participation, and confidentiality language. The IRB was discussed with the subject and IRB contact information was reviewed and highlighted. Arya Weathers MD was available to discuss the study indications, procedure(s), alternative treatments, anticipated risks, expected/anticipated benefits, and answered any related questions. The subject was given 15 minutes to independently read and review the informed consent form. The subject expressed a clear understanding of the risks, benefits, indications, and alternatives to the investigational study and the associated procedure(s). All questions were answered to the subjects liking prior to obtaining informed consent. The subject was provided with a copy of the signed informed consent form and study staff contact information. No study related procedures were performed before the informed consent was signed.    Screening  The investigator reviewed of the subjects chart and determined that the subject met all preliminary inclusion criteria and did not meet any of the preliminary exclusion criteria. The investigator will make the final decision once the screening labs are received and reviewed.      The following screening procedures were performed at todays visit:   Concomitant medications were reviewed (including):   Adverse events were assessed:  o No adverse event occurred after informed consent obtained   Physical Exam was performed by Arya Weathers MD    Medical/surgical/diabetes disease history was reviewed (see worksheet).  o Complications of Diabetes questions  asked   Primary and secondary contact information obtain.   Demographics collected    Vitals collected    EQ-5D-5L questionnaire completed.   Send out Labs collected (fasting):    Plan  Per protocol, the subject signed the Pharmacy Agent Designation Form and the [facility] Medical Records Release. Dr. Weathers sent a study notification to the subjects current medical team. If the subject qualifies for the study, he/she will return to the clinic within 30 days to complete the first infusion visit. The subject was instructed to call the CTU to report AE's in real time.

## 2018-12-03 DIAGNOSIS — E11.42 DIABETIC POLYNEUROPATHY ASSOCIATED WITH TYPE 2 DIABETES MELLITUS: ICD-10-CM

## 2018-12-03 RX ORDER — FUROSEMIDE 40 MG/1
TABLET ORAL
Qty: 60 TABLET | Refills: 3 | Status: SHIPPED | OUTPATIENT
Start: 2018-12-03 | End: 2019-07-31

## 2018-12-03 RX ORDER — DULOXETIN HYDROCHLORIDE 60 MG/1
60 CAPSULE, DELAYED RELEASE ORAL DAILY
Qty: 30 CAPSULE | Refills: 11 | Status: SHIPPED | OUTPATIENT
Start: 2018-12-03 | End: 2019-04-12 | Stop reason: SDUPTHER

## 2018-12-03 NOTE — TELEPHONE ENCOUNTER
----- Message from Ame Velázquez sent at 12/3/2018 10:05 AM CST -----  Contact:   Pt   [phone]  Rx Refill/Request     Is this a Refill or New Rx:     Refill     Rx Name and Strength:      duloxetine (CYMBALTA) 60 MG capsule   Preferred Pharmacy with phone number:    [facility]    [place], [place] - [address] AT [facility] [address] [phone]     Communication Preference:   Phone   Additional Information:    Pt is completely  Out

## 2018-12-03 NOTE — TELEPHONE ENCOUNTER
----- Message from Javier Keller sent at 12/3/2018 10:02 AM CST -----  Contact: self [phone]  Type: Rx    Name of medication(s):  furosemide (LASIX) 40 MG tablet    Is this a refill? New rx? Refill      Who prescribed medication?    Pharmacy Name, Phone, & Location: [facility] [phone]      Comments: please call and advise, Thanks

## 2018-12-05 ENCOUNTER — OFFICE VISIT (OUTPATIENT)
Dept: OPTOMETRY | Facility: CLINIC | Age: 78
End: 2018-12-05
Payer: MEDICARE

## 2018-12-05 DIAGNOSIS — Z96.1 PSEUDOPHAKIA OF BOTH EYES: Primary | ICD-10-CM

## 2018-12-05 DIAGNOSIS — H04.123 DRY EYE SYNDROME, BILATERAL: ICD-10-CM

## 2018-12-05 DIAGNOSIS — H10.13 CONJUNCTIVITIS, ALLERGIC, BILATERAL: ICD-10-CM

## 2018-12-05 PROCEDURE — 99024 POSTOP FOLLOW-UP VISIT: CPT | Mod: HCNC,S$GLB,, | Performed by: OPTOMETRIST

## 2018-12-05 PROCEDURE — 99999 PR PBB SHADOW E&M-EST. PATIENT-LVL II: CPT | Mod: PBBFAC,HCNC,, | Performed by: OPTOMETRIST

## 2018-12-05 RX ORDER — OLOPATADINE HYDROCHLORIDE 1 MG/ML
1 SOLUTION/ DROPS OPHTHALMIC 2 TIMES DAILY
Qty: 5 ML | Refills: 12 | Status: SHIPPED | OUTPATIENT
Start: 2018-12-05 | End: 2019-06-04

## 2018-12-05 NOTE — PROGRESS NOTES
"HPI     78yr old female present for 1 month Post Op s/p CEIOL OU with Dr. Valle. Patient complain of eyes constantly tearing OS>OD, using AT's   BID-OU. Patient says her vision "foggy" and it's goes in and out of   blurriness. Patient having slight headaches on occasion. Patient seeing   floaters, but no new onset since eye surgery.     Last edited by Jeffrey Baig MA on 12/5/2018  1:16 PM. (History)            Assessment /Plan     For exam results, see Encounter Report.    Pseudophakia of both eyes    Dry eye syndrome, bilateral    Conjunctivitis, allergic, bilateral    Other orders  -     olopatadine (PATANOL) 0.1 % ophthalmic solution; Place 1 drop into both eyes 2 (two) times daily.  Dispense: 5 mL; Refill: 12    Use +2.50 OTC readers PRN    RTC 6 mo DFE, OCT mac, refraction                 "

## 2018-12-07 ENCOUNTER — TELEPHONE (OUTPATIENT)
Dept: OPTOMETRY | Facility: CLINIC | Age: 78
End: 2018-12-07

## 2018-12-11 DIAGNOSIS — Z79.4 TYPE 2 DIABETES MELLITUS WITH DIABETIC POLYNEUROPATHY, WITH LONG-TERM CURRENT USE OF INSULIN: ICD-10-CM

## 2018-12-11 DIAGNOSIS — E11.42 TYPE 2 DIABETES MELLITUS WITH DIABETIC POLYNEUROPATHY, WITH LONG-TERM CURRENT USE OF INSULIN: ICD-10-CM

## 2018-12-12 RX ORDER — INSULIN GLARGINE 100 [IU]/ML
INJECTION, SOLUTION SUBCUTANEOUS
Qty: 60 ML | Refills: 3 | Status: SHIPPED | OUTPATIENT
Start: 2018-12-12 | End: 2019-10-17 | Stop reason: SDUPTHER
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
## 2018-12-14 ENCOUNTER — OFFICE VISIT (OUTPATIENT)
Dept: SPINE | Facility: CLINIC | Age: 78
End: 2018-12-14
Attending: PHYSICAL MEDICINE & REHABILITATION
Payer: MEDICARE

## 2018-12-14 VITALS
HEART RATE: 75 BPM | DIASTOLIC BLOOD PRESSURE: 80 MMHG | BODY MASS INDEX: 33.65 KG/M2 | SYSTOLIC BLOOD PRESSURE: 145 MMHG | WEIGHT: 222 LBS | HEIGHT: 68 IN

## 2018-12-14 DIAGNOSIS — M51.36 DDD (DEGENERATIVE DISC DISEASE), LUMBAR: ICD-10-CM

## 2018-12-14 DIAGNOSIS — M47.816 LUMBAR SPONDYLOSIS: Primary | ICD-10-CM

## 2018-12-14 DIAGNOSIS — G56.03 BILATERAL CARPAL TUNNEL SYNDROME: ICD-10-CM

## 2018-12-14 DIAGNOSIS — M48.061 SPINAL STENOSIS OF LUMBAR REGION, UNSPECIFIED WHETHER NEUROGENIC CLAUDICATION PRESENT: ICD-10-CM

## 2018-12-14 PROCEDURE — 1100F PTFALLS ASSESS-DOCD GE2>/YR: CPT | Mod: CPTII,HCNC,S$GLB, | Performed by: PHYSICAL MEDICINE & REHABILITATION

## 2018-12-14 PROCEDURE — 99214 OFFICE O/P EST MOD 30 MIN: CPT | Mod: HCNC,S$GLB,, | Performed by: PHYSICAL MEDICINE & REHABILITATION

## 2018-12-14 PROCEDURE — 3077F SYST BP >= 140 MM HG: CPT | Mod: CPTII,HCNC,S$GLB, | Performed by: PHYSICAL MEDICINE & REHABILITATION

## 2018-12-14 PROCEDURE — 99999 PR PBB SHADOW E&M-EST. PATIENT-LVL III: CPT | Mod: PBBFAC,HCNC,, | Performed by: PHYSICAL MEDICINE & REHABILITATION

## 2018-12-14 PROCEDURE — 3079F DIAST BP 80-89 MM HG: CPT | Mod: CPTII,HCNC,S$GLB, | Performed by: PHYSICAL MEDICINE & REHABILITATION

## 2018-12-14 PROCEDURE — 3288F FALL RISK ASSESSMENT DOCD: CPT | Mod: CPTII,HCNC,S$GLB, | Performed by: PHYSICAL MEDICINE & REHABILITATION

## 2018-12-14 RX ORDER — ASPIRIN 325 MG
TABLET ORAL
COMMUNITY
Start: 2018-09-21 | End: 2018-12-14 | Stop reason: CLARIF

## 2018-12-14 RX ORDER — TIZANIDINE 4 MG/1
2-4 TABLET ORAL EVERY 8 HOURS
Qty: 270 TABLET | Refills: 3 | Status: SHIPPED | OUTPATIENT
Start: 2018-12-14 | End: 2020-01-20

## 2018-12-14 RX ORDER — CETIRIZINE HCL 1 MG/ML
SOLUTION, ORAL ORAL
COMMUNITY
Start: 2018-09-21 | End: 2018-12-18

## 2018-12-17 ENCOUNTER — OFFICE VISIT (OUTPATIENT)
Dept: PODIATRY | Facility: CLINIC | Age: 78
End: 2018-12-17
Payer: MEDICARE

## 2018-12-17 VITALS
BODY MASS INDEX: 34.08 KG/M2 | WEIGHT: 224.88 LBS | HEART RATE: 72 BPM | HEIGHT: 68 IN | SYSTOLIC BLOOD PRESSURE: 160 MMHG | RESPIRATION RATE: 18 BRPM | DIASTOLIC BLOOD PRESSURE: 81 MMHG

## 2018-12-17 DIAGNOSIS — L84 CORN OR CALLUS: ICD-10-CM

## 2018-12-17 DIAGNOSIS — E11.42 DIABETIC POLYNEUROPATHY ASSOCIATED WITH TYPE 2 DIABETES MELLITUS: Primary | ICD-10-CM

## 2018-12-17 DIAGNOSIS — B35.1 ONYCHOMYCOSIS DUE TO DERMATOPHYTE: ICD-10-CM

## 2018-12-17 PROCEDURE — 99999 PR PBB SHADOW E&M-EST. PATIENT-LVL IV: CPT | Mod: PBBFAC,HCNC,, | Performed by: PODIATRIST

## 2018-12-17 PROCEDURE — 99499 UNLISTED E&M SERVICE: CPT | Mod: HCNC,S$GLB,, | Performed by: PODIATRIST

## 2018-12-17 PROCEDURE — 11721 DEBRIDE NAIL 6 OR MORE: CPT | Mod: 59,Q9,HCNC,S$GLB | Performed by: PODIATRIST

## 2018-12-17 PROCEDURE — 11057 PARNG/CUTG B9 HYPRKR LES >4: CPT | Mod: Q9,HCNC,S$GLB, | Performed by: PODIATRIST

## 2018-12-18 ENCOUNTER — OFFICE VISIT (OUTPATIENT)
Dept: INTERNAL MEDICINE | Facility: CLINIC | Age: 78
End: 2018-12-18
Payer: MEDICARE

## 2018-12-18 VITALS
OXYGEN SATURATION: 98 % | HEIGHT: 68 IN | HEART RATE: 78 BPM | BODY MASS INDEX: 33.53 KG/M2 | WEIGHT: 221.25 LBS | DIASTOLIC BLOOD PRESSURE: 70 MMHG | SYSTOLIC BLOOD PRESSURE: 128 MMHG

## 2018-12-18 DIAGNOSIS — K57.32 DIVERTICULITIS OF LARGE INTESTINE WITHOUT PERFORATION OR ABSCESS WITHOUT BLEEDING: ICD-10-CM

## 2018-12-18 DIAGNOSIS — G89.29 OTHER CHRONIC BACK PAIN: Primary | Chronic | ICD-10-CM

## 2018-12-18 DIAGNOSIS — F39 MOOD DISORDER: ICD-10-CM

## 2018-12-18 DIAGNOSIS — I10 ESSENTIAL HYPERTENSION: Chronic | ICD-10-CM

## 2018-12-18 DIAGNOSIS — E78.5 HYPERLIPIDEMIA, UNSPECIFIED HYPERLIPIDEMIA TYPE: ICD-10-CM

## 2018-12-18 DIAGNOSIS — E11.42 TYPE 2 DIABETES MELLITUS WITH DIABETIC POLYNEUROPATHY, WITH LONG-TERM CURRENT USE OF INSULIN: ICD-10-CM

## 2018-12-18 DIAGNOSIS — M54.9 OTHER CHRONIC BACK PAIN: Primary | Chronic | ICD-10-CM

## 2018-12-18 DIAGNOSIS — Z79.4 TYPE 2 DIABETES MELLITUS WITH DIABETIC POLYNEUROPATHY, WITH LONG-TERM CURRENT USE OF INSULIN: ICD-10-CM

## 2018-12-18 PROCEDURE — 3078F DIAST BP <80 MM HG: CPT | Mod: CPTII,S$GLB,, | Performed by: INTERNAL MEDICINE

## 2018-12-18 PROCEDURE — 3288F FALL RISK ASSESSMENT DOCD: CPT | Mod: CPTII,S$GLB,, | Performed by: INTERNAL MEDICINE

## 2018-12-18 PROCEDURE — 1100F PTFALLS ASSESS-DOCD GE2>/YR: CPT | Mod: CPTII,S$GLB,, | Performed by: INTERNAL MEDICINE

## 2018-12-18 PROCEDURE — 99214 OFFICE O/P EST MOD 30 MIN: CPT | Mod: S$GLB,,, | Performed by: INTERNAL MEDICINE

## 2018-12-18 PROCEDURE — 3074F SYST BP LT 130 MM HG: CPT | Mod: CPTII,S$GLB,, | Performed by: INTERNAL MEDICINE

## 2018-12-18 PROCEDURE — 99999 PR PBB SHADOW E&M-EST. PATIENT-LVL II: CPT | Mod: PBBFAC,HCNC,, | Performed by: INTERNAL MEDICINE

## 2018-12-18 RX ORDER — OXYCODONE AND ACETAMINOPHEN 10; 325 MG/1; MG/1
1 TABLET ORAL
Qty: 150 TABLET | Refills: 0 | Status: SHIPPED | OUTPATIENT
Start: 2018-12-18 | End: 2019-01-16 | Stop reason: SDUPTHER

## 2018-12-18 NOTE — PROGRESS NOTES
CHIEF COMPLAINT: Follow up of dizziness, back pain, hypertension, diabetes     HISTORY OF PRESENT ILLNESS: This is a 78-year-old woman who presents for follow up of above.    She has occasional abdominal pain occasionally. No pain recently..  Appetite is ok. Her abdominal pain resolved after taking a 14 day course of Augmentin (10/18/18 to 11/1/18).  SHe was pain free and had a good appetite for 2 weeks.  Appetite is still good.  Occasional nausea - 2 times since our last visit..  NO vomiting, constipation, diarrhea, dysuria or hematuria. She is taking miralax 1 capful daily as needed      She is taking Cymbalta 60 mg daily, Oxycodone apap 10/325 3-4 times daily, Pantoprazole 40 mg once daily, carvedilol 25 mg twice daily, Vitamin D3 2000 units daily, Loratadine 10 mg daily as needed, Aspirin 81 mg daily, Furosemide 40 mg once daily, potassium 10 meq twice daily.  She feels that the potassium is helping her energy level.      Breathing has been ok. She has not needed her albuterol breathing treatments lately. No mucous production.  No fever or chills.      Mood has been worse lately. She lives alone. Money has been tight. NO homicidal or suicidal ideations. She tried Wellbutrin - she states it caused nausea vomiting and diarrhea so she stopped it. She continues to take CYmabalta 60 mg daily.      She is taking Lantus 60 units in the evening and Novolog 23 units twice daily with meals.  No hypoglycemia.  Home health finished - was coming out for medication compliance     Heartburn is controlled. She is taking pantoprazole 40 mg once daily .     She continues to have low back pain. will starting therapy again in January.  She saw Dr Le 6/5/18 and had MRI lumbar spine 6/12/18 - Moderate degenerative change at L4-5.  Dr Le recommended epidural - she is not sure if she wants to do the epidural. She takes oxycodone apap 10/325 one tablet 3-5 times daily depending on her pain. SHe takes Cymbalta 60 mg daily which  "helps her pain and her mood. She went to [facility] this past weekend which she enjoyed.       She continues to take aspirin 81 mg daily, coreg 25 mg twice daily and lasix 40 mg twice daily for her diastolic dysfunction and hypertension. She denies lightheadedness. No chest pain or shortness of breath. She is not taking atorvastatin 40 mg once daily for her cholesterol.      Grandson still is living with her. He got his CDL license and wants to drive trucks.             PAST MEDICAL HISTORY:   1. Diabetes mellitus   2. Hypertension   3. Hyperlipidemia   4. Left heart catheterization January 2007 which revealed luminal irregularities in the LAD, left circumflex and right coronary artery. She had diastolic dysfunction and patent renal arteries.   5. Sleep apnea   6. Obesity.   7. Nephrolithiasis status post lithotripsy.   8. Reflux - had nonerosive gastropathy on EGD September 2007. Gastritis on EGD 9/2010  9. Hidradenitis suppurativa.   10. History of diverticulitis with a hospitalized October 2007.   11. Migraines   12. Obesity.   13. 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    14. Total hysterectomy in 1969.   15. Cholecystectomy was done at that time as well.   16. 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   17. Colon polyps on colonscopy 11/2010 - due 2013   18. Hospitalization 12/10 for e coli sepsis due to left ureteral stone with left nephrostomy tube in [place], [place]   19. Left knee replacement 9/2012      MEDICATIONS and ALLERGIES: Updated on EPIC.     PHYSICAL EXAMINATION:    /70   Pulse 78   Ht 5' 8" (1.727 m)   Wt 100.4 kg (221 lb 3.7 oz)   LMP  (LMP Unknown)   SpO2 98%   BMI 33.64 kg/m²      GENERAL: She is alert, oriented, no apparent distress. Affect within   normal limits.  Conjunctiva anicteric. . Oropharynx clear.   NECK: Supple.   Respiratory: Effort normal. Lungs clear  HEART: Regular rate and rhythm " without murmurs, gallops or rubs.   No lower extremity edema.    ABDOMEN: soft, non distended, non tender, bowel sounds present, no hepatosplenomgaly           ASSESSMENT AND PLAN:   1. Diverticulitis- better  2.  Mood disorder - continue cymbalta for now.   3. neck, shoulder and back pain - to start physical thearpy  4. Asthma - stable   6. HTN -stable  7. Gerd - stable pantoprazole to 40 mg daily   8. Diabetes with neuropathy- lwatch sugars  9. Hyperlipidemia - off lipitor  10. Allergic rhinitis - stable  11. Diastolic congestive heart failure - stable.    12. CRI -stable  13. Obesity - discussed diet, exercise and weight loss  14. CAD -risk factor modification  15. Aortic atherosclerosis and possible mesenteric artery stenosis- risk factor modification  16. Tortuous aorta - HTN controlled  17. Colon polyps - Colonoscopy in 8/2013 - 2 polyps. Flex sig 11/2013 - mild granular mucosa.  18. hyperparathryodisism -saw nephrology   19. Elevated uric acid level - no signs of gout currently. Will montior for now.    Recommended colonoscopy in 8/2018. Attempted colonoscopy 11/2016 du to diarrhea which has resolved. She had poor prep with hyperplastic rectal biopsy.   MMG 1/18  Prevnar 10/15 and flu shot 11/16  I will see her back in 3 weeks sooner if issues. .

## 2018-12-21 ENCOUNTER — TELEPHONE (OUTPATIENT)
Dept: INTERNAL MEDICINE | Facility: CLINIC | Age: 78
End: 2018-12-21

## 2018-12-21 ENCOUNTER — OFFICE VISIT (OUTPATIENT)
Dept: URGENT CARE | Facility: CLINIC | Age: 78
End: 2018-12-21
Payer: MEDICARE

## 2018-12-21 VITALS
SYSTOLIC BLOOD PRESSURE: 111 MMHG | TEMPERATURE: 98 F | OXYGEN SATURATION: 97 % | WEIGHT: 221 LBS | HEIGHT: 68 IN | DIASTOLIC BLOOD PRESSURE: 66 MMHG | HEART RATE: 69 BPM | RESPIRATION RATE: 18 BRPM | BODY MASS INDEX: 33.49 KG/M2

## 2018-12-21 DIAGNOSIS — J06.9 UPPER RESPIRATORY TRACT INFECTION, UNSPECIFIED TYPE: Primary | ICD-10-CM

## 2018-12-21 PROCEDURE — 1100F PTFALLS ASSESS-DOCD GE2>/YR: CPT | Mod: CPTII,S$GLB,, | Performed by: NURSE PRACTITIONER

## 2018-12-21 PROCEDURE — 3074F SYST BP LT 130 MM HG: CPT | Mod: CPTII,S$GLB,, | Performed by: NURSE PRACTITIONER

## 2018-12-21 PROCEDURE — 3288F FALL RISK ASSESSMENT DOCD: CPT | Mod: CPTII,S$GLB,, | Performed by: NURSE PRACTITIONER

## 2018-12-21 PROCEDURE — 3078F DIAST BP <80 MM HG: CPT | Mod: CPTII,S$GLB,, | Performed by: NURSE PRACTITIONER

## 2018-12-21 PROCEDURE — 99214 OFFICE O/P EST MOD 30 MIN: CPT | Mod: S$GLB,,, | Performed by: NURSE PRACTITIONER

## 2018-12-21 RX ORDER — BENZONATATE 100 MG/1
200 CAPSULE ORAL 3 TIMES DAILY PRN
Qty: 40 CAPSULE | Refills: 0 | Status: SHIPPED | OUTPATIENT
Start: 2018-12-21 | End: 2018-12-31

## 2018-12-21 RX ORDER — AZELASTINE 1 MG/ML
1 SPRAY, METERED NASAL 2 TIMES DAILY
Qty: 30 ML | Refills: 0 | Status: SHIPPED | OUTPATIENT
Start: 2018-12-21 | End: 2019-04-12 | Stop reason: SDUPTHER

## 2018-12-21 RX ORDER — AMOXICILLIN AND CLAVULANATE POTASSIUM 875; 125 MG/1; MG/1
1 TABLET, FILM COATED ORAL 2 TIMES DAILY
Qty: 20 TABLET | Refills: 0 | Status: SHIPPED | OUTPATIENT
Start: 2018-12-21 | End: 2018-12-31

## 2018-12-21 NOTE — TELEPHONE ENCOUNTER
----- Message from Ngoc Coy sent at 12/21/2018 11:01 AM CST -----  Contact: Patient [phone]  Patient is requesting medication for a cold with cough.. Cough is persistent. Body ache' spitting up green mucus.     [facility] [address] [place] [address] [address] AT [facility] [address] [phone] (Phone)  [phone] (Fax)    Please call and advise  Thank you

## 2018-12-21 NOTE — PROGRESS NOTES
"Subjective:       Patient ID: Henrietta Villasenor is a 78 y.o. female.    Vitals:  height is 5' 8" (1.727 m) and weight is 100.2 kg (221 lb). Her tympanic temperature is 98.1 °F (36.7 °C). Her blood pressure is 111/66 and her pulse is 69. Her respiration is 18 and oxygen saturation is 97%.     Chief Complaint: Sinus Problem    Cough and congestion for 3 days       Sinus Problem   This is a new problem. The current episode started in the past 7 days. The problem is unchanged. There has been no fever. Associated symptoms include chills, congestion, coughing and sinus pressure. Pertinent negatives include no diaphoresis, ear pain, shortness of breath or sore throat. Past treatments include oral decongestants. The treatment provided no relief.       Constitution: Positive for chills. Negative for sweating, fatigue and fever.   HENT: Positive for congestion and sinus pressure. Negative for ear pain, sinus pain, sore throat and voice change.    Neck: Negative for painful lymph nodes.   Eyes: Negative for eye redness.   Respiratory: Positive for cough. Negative for chest tightness, sputum production, bloody sputum, COPD, shortness of breath, stridor, wheezing and asthma.    Gastrointestinal: Negative for nausea and vomiting.   Musculoskeletal: Negative for muscle ache.   Skin: Negative for rash.   Allergic/Immunologic: Negative for seasonal allergies and asthma.   Hematologic/Lymphatic: Negative for swollen lymph nodes.       Objective:      Physical Exam   Constitutional: She is oriented to person, place, and time. She appears well-developed and well-nourished. She is cooperative.  Non-toxic appearance. She does not appear ill. No distress.   HENT:   Head: Normocephalic and atraumatic.   Right Ear: Hearing, tympanic membrane, external ear and ear canal normal.   Left Ear: Decreased hearing is noted.   Nose: Nose normal. No mucosal edema, rhinorrhea or nasal deformity. No epistaxis. Right sinus exhibits no maxillary sinus " tenderness and no frontal sinus tenderness. Left sinus exhibits no maxillary sinus tenderness and no frontal sinus tenderness.   Mouth/Throat: Uvula is midline, oropharynx is clear and moist and mucous membranes are normal. No trismus in the jaw. Normal dentition. No uvula swelling. No posterior oropharyngeal erythema.   Eyes: Conjunctivae and lids are normal. Right eye exhibits no discharge. Left eye exhibits no discharge. No scleral icterus.   Sclera clear bilat   Neck: Trachea normal, normal range of motion, full passive range of motion without pain and phonation normal. Neck supple.   Cardiovascular: Normal rate, regular rhythm, normal heart sounds, intact distal pulses and normal pulses.   Pulmonary/Chest: Effort normal and breath sounds normal. No respiratory distress.   Abdominal: Soft. Normal appearance and bowel sounds are normal. She exhibits no distension, no pulsatile midline mass and no mass. There is no tenderness.   Musculoskeletal: Normal range of motion. She exhibits no edema or deformity.   Neurological: She is alert and oriented to person, place, and time. She exhibits normal muscle tone. Coordination normal.   Skin: Skin is warm, dry and intact. She is not diaphoretic. No pallor.   Psychiatric: She has a normal mood and affect. Her speech is normal and behavior is normal. Judgment and thought content normal. Cognition and memory are normal.   Nursing note and vitals reviewed.      Assessment:       1. Upper respiratory tract infection, unspecified type        Plan:         Upper respiratory tract infection, unspecified type    Other orders  -     benzonatate (TESSALON PERLES) 100 MG capsule; Take 2 capsules (200 mg total) by mouth 3 (three) times daily as needed for Cough.  Dispense: 40 capsule; Refill: 0  -     azelastine (ASTELIN) 137 mcg (0.1 %) nasal spray; 1 spray (137 mcg total) by Nasal route 2 (two) times daily.  Dispense: 30 mL; Refill: 0      Patient Instructions     Viral Upper  Respiratory Illness (Adult)  You have a viral upper respiratory illness (URI), which is another term for the common cold. This illness is contagious during the first few days. It is spread through the air by coughing and sneezing. It may also be spread by direct contact (touching the sick person and then touching your own eyes, nose, or mouth). Frequent handwashing will decrease risk of spread. Most viral illnesses go away within 7 to 10 days with rest and simple home remedies. Sometimes the illness may last for several weeks. Antibiotics will not kill a virus, and they are generally not prescribed for this condition.    Home care  · If symptoms are severe, rest at home for the first 2 to 3 days. When you resume activity, don't let yourself get too tired.  · Avoid being exposed to cigarette smoke (yours or others).  · You may use acetaminophen or ibuprofen to control pain and fever, unless another medicine was prescribed. (Note: If you have chronic liver or kidney disease, have ever had a stomach ulcer or gastrointestinal bleeding, or are taking blood-thinning medicines, talk with your healthcare provider before using these medicines.) Aspirin should never be given to anyone under 18 years of age who is ill with a viral infection or fever. It may cause severe liver or brain damage.  · Your appetite may be poor, so a light diet is fine. Avoid dehydration by drinking 6 to 8 glasses of fluids per day (water, soft drinks, juices, tea, or soup). Extra fluids will help loosen secretions in the nose and lungs.  · Over-the-counter cold medicines will not shorten the length of time youre sick, but they may be helpful for the following symptoms: cough, sore throat, and nasal and sinus congestion. (Note: Do not use decongestants if you have high blood pressure.)  Follow-up care  Follow up with your healthcare provider, or as advised.  When to seek medical advice  Call your healthcare provider right away if any of these  occur:  · Cough with lots of colored sputum (mucus)  · Severe headache; face, neck, or ear pain  · Difficulty swallowing due to throat pain  · Fever of 100.4°F (38°C)  Call 911, or get immediate medical care  Call emergency services right away if any of these occur:  · Chest pain, shortness of breath, wheezing, or difficulty breathing  · Coughing up blood  · Inability to swallow due to throat pain  Date Last Reviewed: 9/13/2015 © 2000-2017 [employer]. [address] [place] [address]. All rights reserved. This information is not intended as a substitute for professional medical care. Always follow your healthcare professional's instructions.

## 2018-12-21 NOTE — PATIENT INSTRUCTIONS
Viral Upper Respiratory Illness (Adult)  You have a viral upper respiratory illness (URI), which is another term for the common cold. This illness is contagious during the first few days. It is spread through the air by coughing and sneezing. It may also be spread by direct contact (touching the sick person and then touching your own eyes, nose, or mouth). Frequent handwashing will decrease risk of spread. Most viral illnesses go away within 7 to 10 days with rest and simple home remedies. Sometimes the illness may last for several weeks. Antibiotics will not kill a virus, and they are generally not prescribed for this condition.    Home care  · If symptoms are severe, rest at home for the first 2 to 3 days. When you resume activity, don't let yourself get too tired.  · Avoid being exposed to cigarette smoke (yours or others).  · You may use acetaminophen or ibuprofen to control pain and fever, unless another medicine was prescribed. (Note: If you have chronic liver or kidney disease, have ever had a stomach ulcer or gastrointestinal bleeding, or are taking blood-thinning medicines, talk with your healthcare provider before using these medicines.) Aspirin should never be given to anyone under 18 years of age who is ill with a viral infection or fever. It may cause severe liver or brain damage.  · Your appetite may be poor, so a light diet is fine. Avoid dehydration by drinking 6 to 8 glasses of fluids per day (water, soft drinks, juices, tea, or soup). Extra fluids will help loosen secretions in the nose and lungs.  · Over-the-counter cold medicines will not shorten the length of time youre sick, but they may be helpful for the following symptoms: cough, sore throat, and nasal and sinus congestion. (Note: Do not use decongestants if you have high blood pressure.)  Follow-up care  Follow up with your healthcare provider, or as advised.  When to seek medical advice  Call your healthcare provider right away if any  of these occur:  · Cough with lots of colored sputum (mucus)  · Severe headache; face, neck, or ear pain  · Difficulty swallowing due to throat pain  · Fever of 100.4°F (38°C)  Call 911, or get immediate medical care  Call emergency services right away if any of these occur:  · Chest pain, shortness of breath, wheezing, or difficulty breathing  · Coughing up blood  · Inability to swallow due to throat pain  Date Last Reviewed: 9/13/2015  © 7590-6354 [employer]. [address] [place] [address]. All rights reserved. This information is not intended as a substitute for professional medical care. Always follow your healthcare professional's instructions.

## 2018-12-31 ENCOUNTER — HOSPITAL ENCOUNTER (OUTPATIENT)
Dept: RADIOLOGY | Facility: HOSPITAL | Age: 78
Discharge: HOME | End: 2018-12-31
Attending: NURSE PRACTITIONER
Payer: MEDICARE

## 2018-12-31 ENCOUNTER — OFFICE VISIT (OUTPATIENT)
Dept: INTERNAL MEDICINE | Facility: CLINIC | Age: 78
End: 2018-12-31
Payer: MEDICARE

## 2018-12-31 VITALS
HEART RATE: 77 BPM | OXYGEN SATURATION: 97 % | DIASTOLIC BLOOD PRESSURE: 72 MMHG | BODY MASS INDEX: 32.64 KG/M2 | TEMPERATURE: 98 F | HEIGHT: 68 IN | SYSTOLIC BLOOD PRESSURE: 124 MMHG | WEIGHT: 215.38 LBS

## 2018-12-31 DIAGNOSIS — J40 BRONCHITIS: ICD-10-CM

## 2018-12-31 DIAGNOSIS — R05.9 COUGH: Primary | ICD-10-CM

## 2018-12-31 DIAGNOSIS — J32.9 OTHER SINUSITIS, UNSPECIFIED CHRONICITY: ICD-10-CM

## 2018-12-31 DIAGNOSIS — R05.9 COUGH: ICD-10-CM

## 2018-12-31 PROCEDURE — 1101F PT FALLS ASSESS-DOCD LE1/YR: CPT | Mod: CPTII,HCNC,S$GLB, | Performed by: NURSE PRACTITIONER

## 2018-12-31 PROCEDURE — 71046 XR CHEST PA AND LATERAL: ICD-10-PCS | Mod: 26,HCNC,, | Performed by: RADIOLOGY

## 2018-12-31 PROCEDURE — 71046 X-RAY EXAM CHEST 2 VIEWS: CPT | Mod: TC,HCNC

## 2018-12-31 PROCEDURE — 3074F SYST BP LT 130 MM HG: CPT | Mod: CPTII,HCNC,S$GLB, | Performed by: NURSE PRACTITIONER

## 2018-12-31 PROCEDURE — 71046 X-RAY EXAM CHEST 2 VIEWS: CPT | Mod: 26,HCNC,, | Performed by: RADIOLOGY

## 2018-12-31 PROCEDURE — 99999 PR PBB SHADOW E&M-EST. PATIENT-LVL III: CPT | Mod: PBBFAC,HCNC,, | Performed by: NURSE PRACTITIONER

## 2018-12-31 PROCEDURE — 3078F DIAST BP <80 MM HG: CPT | Mod: CPTII,HCNC,S$GLB, | Performed by: NURSE PRACTITIONER

## 2018-12-31 PROCEDURE — 99214 OFFICE O/P EST MOD 30 MIN: CPT | Mod: HCNC,S$GLB,, | Performed by: NURSE PRACTITIONER

## 2018-12-31 RX ORDER — DOXYCYCLINE HYCLATE 100 MG
100 TABLET ORAL EVERY 12 HOURS
Qty: 20 TABLET | Refills: 0 | Status: SHIPPED | OUTPATIENT
Start: 2018-12-31 | End: 2019-01-10

## 2018-12-31 RX ORDER — PROMETHAZINE HYDROCHLORIDE AND DEXTROMETHORPHAN HYDROBROMIDE 6.25; 15 MG/5ML; MG/5ML
5 SYRUP ORAL NIGHTLY PRN
Qty: 118 ML | Refills: 0 | Status: SHIPPED | OUTPATIENT
Start: 2018-12-31 | End: 2019-01-23

## 2018-12-31 NOTE — PROGRESS NOTES
Subjective:       Patient ID: Henrietta Villasenor is a 78 y.o. female.    Chief Complaint: URI    HPI:  77 yo female that presents to clinic today with complaint of cough and congestion.    States that symptoms have been going on for over two weeks.  Was seen in  on 12/21/18 for similar symptoms where she was given prescription tessalon perls and told to take Flonase to which she has had no improvement.  States that she has had increased head pressure and congestion.  Reports green mucus production.  Denies any fever, chest pain, n/v or dizziness.  States that she does get a little short of breath during coughing spells.  States that cough is worse at night and keeps her from sleeping.  States that appetite and energy level are both down.    Review of Systems   Constitutional: Positive for appetite change and fatigue. Negative for activity change and fever.   HENT: Positive for congestion, postnasal drip, rhinorrhea, sinus pressure and sinus pain. Negative for ear pain and sore throat.    Respiratory: Positive for cough and shortness of breath. Negative for apnea and wheezing.    Cardiovascular: Negative for chest pain, palpitations and leg swelling.   Gastrointestinal: Negative for abdominal distention, abdominal pain, constipation, diarrhea, nausea and vomiting.   Musculoskeletal: Negative for myalgias, neck pain and neck stiffness.   Skin: Negative for color change and rash.   Neurological: Negative for dizziness, light-headedness, numbness and headaches.   Psychiatric/Behavioral: Negative for behavioral problems.       Objective:      Physical Exam   Constitutional: She is oriented to person, place, and time. She appears well-developed and well-nourished. She appears distressed.   HENT:   Head: Normocephalic and atraumatic.   Right Ear: External ear normal.   Left Ear: External ear normal.   Nose: Rhinorrhea present. Right sinus exhibits maxillary sinus tenderness and frontal sinus tenderness. Left sinus exhibits  maxillary sinus tenderness and frontal sinus tenderness.   + post nasal drip and mild erythema to oropharynx.   Neck: Normal range of motion. Neck supple. No thyromegaly present.   Cardiovascular: Normal rate, regular rhythm, normal heart sounds and intact distal pulses.   No murmur heard.  Pulmonary/Chest: Effort normal. No stridor. No respiratory distress. She has wheezes. She has no rales.   + upper airway congestion that clears with cough.   Abdominal: Soft. Bowel sounds are normal. She exhibits no distension and no mass.   Lymphadenopathy:     She has no cervical adenopathy.   Neurological: She is alert and oriented to person, place, and time. No cranial nerve deficit.   Skin: Skin is warm and dry. No erythema.   Psychiatric: Her behavior is normal.       Assessment:       1. Cough    2. Bronchitis    3. Other sinusitis, unspecified chronicity        Plan:     1.  Cough/bronchitis  -Vitals are stable in clinic.  -Given duration of cough and patient's age, will obtain chest xray to rule out pneumonia.  -Will give prescription for promethazine-DM to take at night to help with cough.  -Encouraged to increase water intake and get plenty of rest.  -Encouraged the use of warm salt water gargles to help with post nasal drip.    2.  Sinusitis  -Will start on doxycycline 100mg po bid x 10 days.  -Can take tylenol for any fever, aches or pains.  -Encouraged to increase water intake and get plenty of rest.  -Encouraged use of normal saline rinses to help with nasal congestion.

## 2019-01-11 ENCOUNTER — TELEPHONE (OUTPATIENT)
Dept: INTERNAL MEDICINE | Facility: CLINIC | Age: 79
End: 2019-01-11

## 2019-01-11 NOTE — TELEPHONE ENCOUNTER
----- Message from Deborah Grant sent at 1/11/2019 10:28 AM CST -----  Contact: [phone]  Patient is requesting a call from the nurse in regards to pain in her left leg. Patient kelsie wants to see the doctor.    Please advise, thank you

## 2019-01-11 NOTE — TELEPHONE ENCOUNTER
Spoke with pt, she states that she doesn't want to see anyone else but she is having leg pain that she only wants PCP to address because she needs her pain medication . apt booked for next week per pt request.

## 2019-01-16 ENCOUNTER — HOSPITAL ENCOUNTER (OUTPATIENT)
Dept: RADIOLOGY | Facility: HOSPITAL | Age: 79
Discharge: HOME | End: 2019-01-16
Attending: INTERNAL MEDICINE
Payer: MEDICARE

## 2019-01-16 ENCOUNTER — OFFICE VISIT (OUTPATIENT)
Dept: INTERNAL MEDICINE | Facility: CLINIC | Age: 79
End: 2019-01-16
Payer: MEDICARE

## 2019-01-16 VITALS
BODY MASS INDEX: 33.23 KG/M2 | HEIGHT: 68 IN | SYSTOLIC BLOOD PRESSURE: 120 MMHG | HEART RATE: 60 BPM | WEIGHT: 219.25 LBS | DIASTOLIC BLOOD PRESSURE: 60 MMHG

## 2019-01-16 DIAGNOSIS — K21.9 GASTROESOPHAGEAL REFLUX DISEASE WITHOUT ESOPHAGITIS: ICD-10-CM

## 2019-01-16 DIAGNOSIS — F39 MOOD DISORDER: ICD-10-CM

## 2019-01-16 DIAGNOSIS — M25.569 KNEE PAIN, UNSPECIFIED CHRONICITY, UNSPECIFIED LATERALITY: ICD-10-CM

## 2019-01-16 DIAGNOSIS — E78.5 HYPERLIPIDEMIA, UNSPECIFIED HYPERLIPIDEMIA TYPE: ICD-10-CM

## 2019-01-16 DIAGNOSIS — Z79.4 TYPE 2 DIABETES MELLITUS WITH DIABETIC POLYNEUROPATHY, WITH LONG-TERM CURRENT USE OF INSULIN: ICD-10-CM

## 2019-01-16 DIAGNOSIS — I10 ESSENTIAL HYPERTENSION: Chronic | ICD-10-CM

## 2019-01-16 DIAGNOSIS — M54.9 OTHER CHRONIC BACK PAIN: Chronic | ICD-10-CM

## 2019-01-16 DIAGNOSIS — G89.29 OTHER CHRONIC BACK PAIN: Chronic | ICD-10-CM

## 2019-01-16 DIAGNOSIS — E11.42 TYPE 2 DIABETES MELLITUS WITH DIABETIC POLYNEUROPATHY, WITH LONG-TERM CURRENT USE OF INSULIN: ICD-10-CM

## 2019-01-16 DIAGNOSIS — M25.569 KNEE PAIN, UNSPECIFIED CHRONICITY, UNSPECIFIED LATERALITY: Primary | ICD-10-CM

## 2019-01-16 PROCEDURE — 1101F PT FALLS ASSESS-DOCD LE1/YR: CPT | Mod: CPTII,S$GLB,, | Performed by: INTERNAL MEDICINE

## 2019-01-16 PROCEDURE — 99214 OFFICE O/P EST MOD 30 MIN: CPT | Mod: S$GLB,,, | Performed by: INTERNAL MEDICINE

## 2019-01-16 PROCEDURE — 3074F SYST BP LT 130 MM HG: CPT | Mod: CPTII,S$GLB,, | Performed by: INTERNAL MEDICINE

## 2019-01-16 PROCEDURE — 73562 X-RAY EXAM OF KNEE 3: CPT | Mod: TC,50,HCNC

## 2019-01-16 PROCEDURE — 3078F PR MOST RECENT DIASTOLIC BLOOD PRESSURE < 80 MM HG: ICD-10-PCS | Mod: CPTII,S$GLB,, | Performed by: INTERNAL MEDICINE

## 2019-01-16 PROCEDURE — 99214 PR OFFICE/OUTPT VISIT, EST, LEVL IV, 30-39 MIN: ICD-10-PCS | Mod: S$GLB,,, | Performed by: INTERNAL MEDICINE

## 2019-01-16 PROCEDURE — 3078F DIAST BP <80 MM HG: CPT | Mod: CPTII,S$GLB,, | Performed by: INTERNAL MEDICINE

## 2019-01-16 PROCEDURE — 3074F PR MOST RECENT SYSTOLIC BLOOD PRESSURE < 130 MM HG: ICD-10-PCS | Mod: CPTII,S$GLB,, | Performed by: INTERNAL MEDICINE

## 2019-01-16 PROCEDURE — 73562 XR KNEE ORTHO BILAT: ICD-10-PCS | Mod: 26,50,HCNC, | Performed by: RADIOLOGY

## 2019-01-16 PROCEDURE — 1101F PR PT FALLS ASSESS DOC 0-1 FALLS W/OUT INJ PAST YR: ICD-10-PCS | Mod: CPTII,S$GLB,, | Performed by: INTERNAL MEDICINE

## 2019-01-16 PROCEDURE — 99999 PR PBB SHADOW E&M-EST. PATIENT-LVL III: ICD-10-PCS | Mod: PBBFAC,HCNC,, | Performed by: INTERNAL MEDICINE

## 2019-01-16 PROCEDURE — 73562 X-RAY EXAM OF KNEE 3: CPT | Mod: 26,50,HCNC, | Performed by: RADIOLOGY

## 2019-01-16 PROCEDURE — 99999 PR PBB SHADOW E&M-EST. PATIENT-LVL III: CPT | Mod: PBBFAC,HCNC,, | Performed by: INTERNAL MEDICINE

## 2019-01-16 RX ORDER — OXYCODONE AND ACETAMINOPHEN 10; 325 MG/1; MG/1
1 TABLET ORAL
Qty: 150 TABLET | Refills: 0 | Status: SHIPPED | OUTPATIENT
Start: 2019-01-16 | End: 2019-02-13 | Stop reason: SDUPTHER

## 2019-01-16 RX ORDER — OXYCODONE AND ACETAMINOPHEN 10; 325 MG/1; MG/1
1 TABLET ORAL
Qty: 150 TABLET | Refills: 0 | Status: SHIPPED | OUTPATIENT
Start: 2019-01-16 | End: 2019-01-16 | Stop reason: SDUPTHER

## 2019-01-16 NOTE — PROGRESS NOTES
CHIEF COMPLAINT: Follow up of leg pain, dizziness, back pain, hypertension, diabetes     HISTORY OF PRESENT ILLNESS: This is a 78-year-old woman who presents for follow up of above.    She had left knee pain for the last 3-4 days. THis morning she also had right knee pain. Knee pain is severe. THe knees are sore. No apparent swelling. No injury to her knees. She has not been walking more lately. Pain kept her awake last night. The area is sensivite to touch. She had a left knee replacement 2012.     She has occasional abdominal pain occasionally. No pain recently..  Appetite is ok. Her abdominal pain resolved after a course of doxycycline. Appetite is still good.  Occasional nausea - 2 times since our last visit..  NO vomiting, constipation, diarrhea, dysuria or hematuria. She is taking miralax 1 capful daily as needed      She is taking Cymbalta 60 mg daily, Oxycodone apap 10/325 3-4 times daily, Pantoprazole 40 mg once daily, carvedilol 25 mg twice daily, Vitamin D3 2000 units daily, Loratadine 10 mg daily as needed, Aspirin 81 mg daily, Furosemide 40 mg once daily, potassium 10 meq twice daily.       Breathing has been ok. She has not needed her albuterol breathing treatments lately. No mucous production.  No fever or chills.      Mood has been worse lately. She lives alone. Money has been tight. NO homicidal or suicidal ideations. She tried Wellbutrin - she states it caused nausea vomiting and diarrhea so she stopped it. She continues to take CYmabalta 60 mg daily.      She is taking Lantus 60 units in the evening and Novolog 23 units twice daily with meals.  No hypoglycemia.  Averages 130-140/    Heartburn is controlled. She is taking pantoprazole 40 mg twice daily .     She continues to have low back pain. will starting therapy again in January.  She saw Dr Le 6/5/18 and had MRI lumbar spine 6/12/18 - Moderate degenerative change at L4-5.  Dr Le recommended epidural - she is not sure if she wants to  "do the epidural. She takes oxycodone apap 10/325 one tablet 3-5 times daily depending on her pain. THE month of December, she had to take the pain pill 5 times daily. SHe takes Cymbalta 60 mg daily which helps her pain and her mood.  She took gabapentin in the past but it made her feel bad.       She continues to take aspirin 81 mg daily, coreg 25 mg twice daily and lasix 40 mg once daily for her diastolic dysfunction and hypertension. She denies lightheadedness. No chest pain or shortness of breath. She is not taking atorvastatin 40 mg once daily for her cholesterol.      Grandson still is living with her. He got his CDL license and wants to drive trucks.              PAST MEDICAL HISTORY:   1. Diabetes mellitus   2. Hypertension   3. Hyperlipidemia   4. Left heart catheterization January 2007 which revealed luminal irregularities in the LAD, left circumflex and right coronary artery. She had diastolic dysfunction and patent renal arteries.   5. Sleep apnea   6. Obesity.   7. Nephrolithiasis status post lithotripsy.   8. Reflux - had nonerosive gastropathy on EGD September 2007. Gastritis on EGD 9/2010  9. Hidradenitis suppurativa.   10. History of diverticulitis with a hospitalized October 2007.   11. Migraines   12. Obesity.   13. 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    14. Total hysterectomy in 1969.   15. Cholecystectomy was done at that time as well.   16. 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   17. Colon polyps on colonscopy 11/2010 - due 2013   18. Hospitalization 12/10 for e coli sepsis due to left ureteral stone with left nephrostomy tube in [place], [place]   19. Left knee replacement 9/2012      MEDICATIONS and ALLERGIES: Updated on EPIC.     PHYSICAL EXAMINATION:     /60 (BP Location: Right arm, Patient Position: Sitting, BP Method: Medium (Manual))   Pulse 60   Ht 5' 8" (1.727 m)   Wt 99.5 kg (219 lb 4 oz)   " LMP  (LMP Unknown)   BMI 33.34 kg/m²    GENERAL: She is alert, oriented, no apparent distress. Affect within   normal limits.  Conjunctiva anicteric. . Oropharynx clear.   NECK: Supple.   Respiratory: Effort normal. Lungs clear  HEART: Regular rate and rhythm without murmurs, gallops or rubs.   No lower extremity edema.    ABDOMEN: soft, non distended, non tender, bowel sounds present, no hepatosplenomgaly  Knee without swelling, erythema or warmth.            ASSESSMENT AND PLAN:   1.Knee pain - xray and to ortho. Get back on oxycodone apap  2.  Mood disorder - continue cymbalta for now.   3. neck, shoulder and back pain - get back with Dr Le  4. Asthma - stable   6. HTN -stable  7. Gerd - take pantoprazole 40 mg twicve daily  8. Diabetes with neuropathy- lwatch sugars  9. Hyperlipidemia - off lipitor  10. Allergic rhinitis - stable  11. Diastolic congestive heart failure - stable.    12. CRI -stable  13. Obesity - discussed diet, exercise and weight loss  14. CAD -risk factor modification  15. Aortic atherosclerosis and possible mesenteric artery stenosis- risk factor modification  16. Tortuous aorta - HTN controlled  17. Colon polyps - Colonoscopy in 8/2013 - 2 polyps. Flex sig 11/2013 - mild granular mucosa.  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 Elevated uric acid level - no signs of gout currently. Will montior for now.    Recommended colonoscopy in 8/2018. Attempted colonoscopy 11/2016 du to diarrhea which has resolved. She had poor prep with hyperplastic rectal biopsy.   MMG 1/18  Prevnar 10/15 and flu shot 11/16  I will see her back in 4 weeks sooner if issues. .

## 2019-01-24 ENCOUNTER — OFFICE VISIT (OUTPATIENT)
Dept: ORTHOPEDICS | Facility: CLINIC | Age: 79
End: 2019-01-24
Payer: MEDICARE

## 2019-01-24 VITALS — BODY MASS INDEX: 32.77 KG/M2 | HEIGHT: 68 IN | WEIGHT: 216.25 LBS

## 2019-01-24 DIAGNOSIS — M17.11 PRIMARY OSTEOARTHRITIS OF RIGHT KNEE: ICD-10-CM

## 2019-01-24 DIAGNOSIS — M25.562 ACUTE PAIN OF BOTH KNEES: Primary | ICD-10-CM

## 2019-01-24 DIAGNOSIS — M25.561 ACUTE PAIN OF BOTH KNEES: Primary | ICD-10-CM

## 2019-01-24 PROCEDURE — 1101F PR PT FALLS ASSESS DOC 0-1 FALLS W/OUT INJ PAST YR: ICD-10-PCS | Mod: HCNC,CPTII,S$GLB, | Performed by: NURSE PRACTITIONER

## 2019-01-24 PROCEDURE — 99203 PR OFFICE/OUTPT VISIT, NEW, LEVL III, 30-44 MIN: ICD-10-PCS | Mod: HCNC,25,S$GLB, | Performed by: NURSE PRACTITIONER

## 2019-01-24 PROCEDURE — 1101F PT FALLS ASSESS-DOCD LE1/YR: CPT | Mod: HCNC,CPTII,S$GLB, | Performed by: NURSE PRACTITIONER

## 2019-01-24 PROCEDURE — 99203 OFFICE O/P NEW LOW 30 MIN: CPT | Mod: HCNC,25,S$GLB, | Performed by: NURSE PRACTITIONER

## 2019-01-24 PROCEDURE — 20610 DRAIN/INJ JOINT/BURSA W/O US: CPT | Mod: HCNC,RT,S$GLB, | Performed by: NURSE PRACTITIONER

## 2019-01-24 PROCEDURE — 99999 PR PBB SHADOW E&M-EST. PATIENT-LVL III: ICD-10-PCS | Mod: PBBFAC,HCNC,, | Performed by: NURSE PRACTITIONER

## 2019-01-24 PROCEDURE — 20610 PR DRAIN/INJECT LARGE JOINT/BURSA: ICD-10-PCS | Mod: HCNC,RT,S$GLB, | Performed by: NURSE PRACTITIONER

## 2019-01-24 PROCEDURE — 99999 PR PBB SHADOW E&M-EST. PATIENT-LVL III: CPT | Mod: PBBFAC,HCNC,, | Performed by: NURSE PRACTITIONER

## 2019-01-24 RX ORDER — TRIAMCINOLONE ACETONIDE 40 MG/ML
40 INJECTION, SUSPENSION INTRA-ARTICULAR; INTRAMUSCULAR
Status: COMPLETED | OUTPATIENT
Start: 2019-01-24 | End: 2019-01-24

## 2019-01-24 RX ADMIN — TRIAMCINOLONE ACETONIDE 40 MG: 40 INJECTION, SUSPENSION INTRA-ARTICULAR; INTRAMUSCULAR at 12:01

## 2019-01-24 NOTE — LETTER
January 24, 2019      Jeannine Arriaga MD  [address]  [facility] [address]           [facility]  [address]  [facility] [address]  Phone: [phone]          Patient: Henrietta Villasenor   MR Number: [id number]   YOB: 1940   Date of Visit: 1/24/2019       Dear Dr. Jeannine Arriaga:    Thank you for referring Henrietta Villasenor to me for evaluation. Attached you will find relevant portions of my assessment and plan of care.    If you have questions, please do not hesitate to call me. I look forward to following Henrietta Villasenor along with you.    Sincerely,    Elizabeth Bello, NP    Enclosure  CC:  No Recipients    If you would like to receive this communication electronically, please contact [email or web address] or [phone] to request more information on [facility] access.    For providers and/or their staff who would like to refer a patient to [facility], please contact us through our one-stop-shop provider referral line, [facility], at [phone].    If you feel you have received this communication in error or would no longer like to receive these types of communications, please e-mail [email or web address]

## 2019-01-24 NOTE — PROGRESS NOTES
CC: Pain of the Left Knee      HPI: Pt with c/o right knee pain for the past few weeks. The pain is aching and global and worse with increased activity. She had a left knee replacement by Dr. Esteban in 2012 and she reports she has occasional pain in the left knee as well from putting more pressure on it due to right knee pain. She had an xray by Dr. Arriaga which shows severe djd in the right knee. She has taken tylenol prn with minimal relief. She is a diabetic and she reports that her diabetic control is ok, but could be better. Her last HgbA1c was 8.4. She is ambulating with a cane for balance. She is currently going to the back and spine center for therapy at [facility].    ROS  General: denies fever and chills  Resp: no c/o sob  CVS: no c/o cp  MSK: c/o right knee pain for the past few months. The pain is aching and global and worse with increased activity    PE  General: AAOx3, pleasant and cooperative  Resp: respirations even and unlabored  MSK: right knee exam  0 degrees extension  120 degrees flexion  No warmth or erythema   - effusion    Xray:  Reviewed by me: Advanced degenerative changes in the right knee, most pronounced in the medial tibiofemoral compartment    Assessment:  Right knee djd  S/p left knee replacement in 2012    Plan:  Cortisone injection right knee today  RICE  Tylenol prn  Physical therapy at [facility] location once she completes the back and spine program at [facility]    Knee Injection Procedure Note    Diagnosis: right knee degenerative arthritis  Indications: right knee pain  Procedure Details: Verbal consent was obtained for the procedure. The injection site was identified and the skin was prepared with alcohol. The right knee was injected from an anterolateral approach with 1 ml of Kenalog and 4 ml Lidocaine under sterile technique using a 22 gauge needle. The needle was removed and the area cleansed and dressed.  Complications:  Patient tolerated the procedure well.    she was  advised to rest the knee today, using ice and elevation as needed for comfort and swelling.Immediate relief of the knee pain may be short lived and secondary to the lidocaine. she may have an increase in discomfort tonight followed by steady improvement over the next several days. It may take 1-2 weeks following the injection to get the full benefit of the medication.

## 2019-01-31 ENCOUNTER — CLINICAL SUPPORT (OUTPATIENT)
Dept: REHABILITATION | Facility: OTHER | Age: 79
End: 2019-01-31
Attending: PHYSICAL MEDICINE & REHABILITATION
Payer: MEDICARE

## 2019-01-31 DIAGNOSIS — M47.816 LUMBAR SPONDYLOSIS: ICD-10-CM

## 2019-01-31 PROCEDURE — 97162 PT EVAL MOD COMPLEX 30 MIN: CPT | Mod: HCNC

## 2019-01-31 PROCEDURE — 97110 THERAPEUTIC EXERCISES: CPT | Mod: HCNC

## 2019-01-31 PROCEDURE — G8979 MOBILITY GOAL STATUS: HCPCS | Mod: CK,HCNC

## 2019-01-31 PROCEDURE — G8978 MOBILITY CURRENT STATUS: HCPCS | Mod: CK,HCNC

## 2019-01-31 NOTE — PLAN OF CARE
[facility] - PHYSICAL THERAPY EVALUATION     Name: Henrietta Villasenor  Clinic Number: [id number]      Diagnosis:   Encounter Diagnosis   Name Primary?    Lumbar spondylosis      M48.061 (ICD-10-CM) - Spinal stenosis of lumbar region, unspecified whether neurogenic claudication present   M51.36 (ICD-10-CM) - DDD (degenerative disc disease), lumbar     Physician: Lizy Le, *  Treatment Orders: PT Eval and Treat    Past Medical History:   Diagnosis Date    Abnormality of lung 11/08/2011    Stable bandlike opacities at the lung bases, most likely representing      Anxiety     Arthritis     CAD (coronary artery disease)     Calculus of ureter 2/22/2011    Cataract     CHF (congestive heart failure)     Chronic back pain 7/29/2012    Colon polyp 9/2010; 11/2010    Colon polyps 7/29/2012    Coronary artery disease involving native coronary artery of native heart with angina pectoris     Depression     Diabetes mellitus     Diabetes mellitus type II     Diverticulitis large intestine 7/27/2015    Diverticulosis 09/25/2010; 11/02/2010; 11/08/2011; 7/29/2012    Duodenal disorder 08/25/2011    Duodenal erosion noted on EGD.    Duodenal ulcer, unspecified as acute or chronic, without hemorrhage, perforation, or obstruction 8/24/2011    E. coli sepsis 12/2010    Due to left ureteral stone with left nephrostomy tube - hospitalized in [place]    Esophageal dysmotility 01/24/2012    Noted on upper GI-barium swallow.    Facial weakness 1969    Left facial weakness s/p left mastoidectomy in ~ 1969.    Fatty liver 11/08/2011    Reported on CT-abdomen and in 06/2012 Gastro clinic visit note.    Gastric polyp 09/29/2010    GERD (gastroesophageal reflux disease) 7/29/2012    Hepatomegaly 11/08/2011    Reported on CT-abdomen    Herpes zoster with other nervous system complications(053.19) 2/28/2011    Hiatal hernia 06/26/2006; 09/29/2010; 08/25/2011    Noted on barium swallow 2006; noted on   "EGD 2011.   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    Trouble in sleeping     Type II or unspecified type diabetes mellitus with neurological manifestations, not stated as uncontrolled(250.60)     Type II or unspecified type diabetes mellitus with peripheral circulatory disorders, not stated as uncontrolled(250.70)     Type II or unspecified type diabetes mellitus with renal manifestations, not stated as uncontrolled(250.40)      Current Outpatient Medications   Medication Sig    ACCU-CHEK FASTCLIX Misc     ACCU-CHEK SMARTVIEW TEST STRIP Strp TEST THREE TIMES DAILY    albuterol (VENTOLIN HFA) 90 mcg/actuation inhaler Inhale 2 puffs by mouth into the lungs every 4 (four) hours as needed for Wheezing. Rescue    ALCOHOL ANTISEPTIC PADS (ALCOHOL PREP PADS TOP)     aspirin (ECOTRIN) 81 MG EC tablet Take 1 tablet by mouth Daily.    azelastine (ASTELIN) 137 mcg (0.1 %) nasal spray 1 spray (137 mcg total) by Nasal route 2 (two) times daily.    BD ULTRA-FINE OLU PEN NEEDLES 32 x 5/32 " Ndle Uses 4 times daily, on multiple daily insulin injections    carbamide peroxide (EAR WAX REMOVAL DROPS OTIC)     carvedilol (COREG) 25 MG tablet TAKE 1 TABLET TWICE DAILY    cholecalciferol, vitamin D3, (VITAMIN D) " "2,000 unit Cap Take by mouth. 1 Capsule Oral Every morning    DULoxetine (CYMBALTA) 60 MG capsule Take 1 capsule (60 mg total) by mouth once daily.    fluticasone (FLONASE) 50 mcg/actuation nasal spray SHAKE LQ AND U 2 SPRAYS IEN QD    furosemide (LASIX) 40 MG tablet TAKE 1 TABLET twice daily    insulin aspart (NOVOLOG) 100 unit/mL InPn pen 23 units with meals plus correction scale. Max daily dose=120 units (Patient taking differently: Inject 23 Units into the skin 3 (three) times daily with meals. 23 units with meals plus correction scale. Max daily dose=120 units)    Lactobac no.41/Bifidobact no.7 (PROBIOTIC-10 ORAL) Take 1 tablet by mouth once daily.    Lactobacillus acidophilus (PROBIOTIC) 10 billion cell Cap Take 1 capsule by mouth once daily.    LANTUS SOLOSTAR U-100 INSULIN glargine 100 units/mL (3mL) SubQ pen INJECT 60 UNITS INTO THE SKIN EVERY EVENING.    loratadine (CLARITIN) 10 mg tablet TAKE 1 TABLET EVERY DAY    MUSCLE RUB, WITH CAMPHOR, 4-30-10 % Crea     nitroGLYCERIN (NITROSTAT) 0.4 MG SL tablet Place 1 tablet (0.4 mg total) under the tongue every 5 (five) minutes as needed.    olopatadine (PATANOL) 0.1 % ophthalmic solution Place 1 drop into both eyes 2 (two) times daily.    oxyCODONE-acetaminophen (PERCOCET)  mg per tablet Take 1 tablet by mouth 5 (five) times daily.    pantoprazole (PROTONIX) 40 MG tablet TAKE 1 TABLET(40 MG) BY MOUTH twice daily    pen needle, diabetic (SURE-FINE PEN NEEDLES) 29 gauge x 1/2" Ndle Use 4 times daily    potassium chloride SA (K-DUR,KLOR-CON) 10 MEQ tablet Take 1 tablet (10 mEq total) by mouth 2 (two) times daily.    tiZANidine (ZANAFLEX) 4 MG tablet Take 0.5-1 tablets (2-4 mg total) by mouth every 8 (eight) hours.    triamcinolone acetonide 0.1% (KENALOG) 0.1 % cream Apply topically 3 (three) times daily.     No current facility-administered medications for this visit.      Facility-Administered Medications Ordered in Other Visits   Medication " "   tetracaine HCl (PF) 0.5 % Drop 1 drop     Review of patient's allergies indicates:   Allergen Reactions    Crestor [rosuvastatin]      Cramping in legs    Ezetimibe      Other reaction(s): abdominal pain, Diarrhea    Hydrocodone      Other reaction(s): Itching    Lisinopril      Other reaction(s): cough      Sulfa (sulfonamide antibiotics)      Itching and Rash      Sulfamethoxazole     Sulfamethoxazole-trimethoprim     Trimethoprim     Valsartan      Other reaction(s): Angioedema       Precautions: Fall, HTN, CAD, CHF, DM II     Pattern of pain determined: 1 PEN    Evaluation Date: 1/31/2019  Authorization Period Expiration: 12/31/2019  Plan of Care Expiration: 04/31/2019  Reassessment Due: 03/02/2019  Visit # / Visits authorized: 1/20    Time In: 1430  Time Out: 1540  Total Billable Time: 70 minutes     HISTORY   History of Present Illness: Henrietta reports having had back pain "on and on and on for years, but seems to be starting to getting a little worse." She reports noticing that it's making it harder for her to walk recently. She also admits that her Right knee is bothering her more and that she was told "there's no more cushion in the joint anymore." She states that she has had some injections in her knee joint; most previously an injection the past week. She has also been through this back program in the past and is aware of the process of strengthening the back.     Per MD Office Visit 12/14/2018 (Dr Le)  "Ms Villasenor is a 77 yo female here for follow up of her CTS and her low back pain.  She was last seen by me on 9/6/2018 and she was having back pain and not going to the gym.  we repeated CT injections on 2/5/2018.  Previously we did bilateral Ct injections on 9/29/2016 which also helped.  We did an MRI of the lumbar spine and discussed JANEE, she decided she did not want to do injection.   Today, she is doing ok.  She is still having back pain and hand pain.  She is also having neck pain.  " "The neck gets stiff and she feels like she cannot turn it.  The back pain is the worst.  Sometimes it is from neck to the waist.  She also has headaches.  She is having stomach problems.  She has not been exercising.  She has not been moving much at home.  She has been having pain all over.  She has foot pain.  She feels like she has good days and bad days.  She has been using tizanidine once a day.  She feels like it helps.  The pain is full spine.  She has dry skin.  Pain is 6/10 now, worst 9/10 with walking, best 5/10 good day"    Diagnostic Tests: From Epic--MRI Lumbar Spine Without Contrast 06/12/2018  FINDINGS:  Alignment: Normal.    Vertebrae: Normal marrow signal. No fracture.    Discs: Normal height and signal.    Cord: Normal.  Conus terminates at superior border of L1    Degenerative findings:    T12-L1: Mild broad-based disc bulge without compressive sequelae.    L1-L2: Unremarkable.    L2-L3: Mild broad-based disc bulge with facet arthropathy.  No compressive sequelae.    L3-L4: Broad-based disc bulge and facet arthropathy.  There is mild bilateral neural foraminal narrowing.    L4-L5: Trace anterolisthesis of L4 on L5 with broad-based disc bulge and facet arthropathy.  This results in moderate bilateral neural foraminal narrowing.  Moderate canal stenosis.  There is also significant narrowing of the left greater than right lateral recesses.    L5-S1: Broad-based disc bulge without significant compressive sequelae.    Paraspinal muscles & soft tissues: Paraspinal atrophy.  No concerning findings in the visualized abdomen.  Add      Impression       Moderate degenerative change at L4-5.  Milder degenerative changes elsewhere in the lumbar spine.         Pain Scale: Henrietta rates pain on a scale of 0-10 to be 10 at worst "in the back and Right knee"; 6 currently "in the knee, but the back feels fine"; 0 at best "only sometimes" using VAS.   Pain location: Right knee joint and mid-line low back " "pain    Aggravating factors: "Moving a lot," "Walking too much," Household chores  Easing Factors: Resting, Pain pills "help some," "heat sometimes"  Disturbed Sleep: "only sometimes"     Pattern of pain questions:  1.  Where is your pain the worst?2 Middle of the low back  2.  Is your pain constant or intermittent? Intermittent  3.  Does bending forward make your typical pain worse? Not sure  4.  Since the start of your back pain, has there been a change in your bowel or bladder? No  5.  What cannot you do now that you use to be able to do? Walk distances    Prior Treatment: Previous patient of [facility] program; Past injections   Prior functional status: Modified independent  DME owned/used: Single Point [employer]ane, Owns Walker but does not use much anymore  Occupation:  Retired; Previously a cook   Leisure: Go to Shinto and various social functions-- "I feel limited by my back pain."                     Patients goals:  "I would like to be able to walk for at least 10 blocks again."    Red Flag Screening:   Cough  Sneeze  Strain: (--)  Bladder/ bowel: (--)  Falls: (+) (One in December)  Night pain: (+)  Unexplained weight loss: (--)  General health: "I would say right now that it is poor"    OBJECTIVE     Postural examination/scapula alignment: Rounded shoulder, Head forward and forward trunk lean with stiff/guarded posture      MOVEMENT LOSS    ROM Loss   Flexion moderate loss   Extension major loss   Side bending Right moderate loss   Side bending Left moderate loss   Rotation Right within functional limits   Rotation Left within functional limits     Lower Extremity Strength AROM: WFL grossly; Exception of hip flexion at 3+/5 strength bilaterally with some complaint of pain    Bridging Endurance Test Supine: 10.00 seconds    GAIT:  Assistive Device used: straight cane  Level of Assistance: supervision  Patient displays the following gait deviations:  unsteady gait.     Special Tests:   Test Name  Test Result "   Straight Leg Raise (+) (Right)   Neural Tension Test (+)   DINAH and FADIR produced lumbar pain bilaterally     NEUROLOGICAL SCREENING     Sensory deficit: None in the LE    REPEATED TEST MOVEMENTS:  Repeated Flexion in Standing end range pain   Repeated Extension in Standing end range pain       Baseline Isometric Testing on Med X equipment: Testing administered by PT  Date of testin2019  ROM 42-0 deg   Max Peak Torque 164 ft/lbs    Min Peak Torque 24 ft/lbs    Flex/Ext Ratio 6.83   % below normative data -53%   Counter weight 275   Femur 5   Seat pad 0     CMS Impairment/Limitation/Restriction for FOTO LUMBAR Survey    Therapist reviewed FOTO scores for Henrietta Villasenor on 2019.   FOTO documents entered into [employer] - see Media section.    Limitation Score: 55%  Category: Mobility    CMS Impairment/Limitation/Restriction for FOTO Lumbar Spine Survey                      Status                    Limitation           G-Code CMS Severity Modifier  Intake           45%                       55%                   Current Status CK - At least 40 percent but less than 60 percent  Predicted      51%                      49%                   Goal Status+ CK - At least 40 percent but less than 60 percent           Treatment   Time In: 1505  Time Out: 1540    PT Evaluation Completed? Yes  Discussed Plan of Care with patient: Yes      Home Exercise Program as follows:   -LTR, Pelvic Tilts, Red Theraband Supine Clams and Marching, Bridging, Seated Trunk Flexion/Extension  Handouts were given to the patient. Pt demo good understanding of the education provided. Henrietta demonstrated good return demonstration of activities.     Pt was instructed in and performed the following:   Cardiovascular exercise and therapeutic exercise to improve posture, lumbar/cervical ROM, strength, and muscular endurance as follows:     Henrietta received therapeutic exercises to develop/improve posture, lumbar/cervical ROM, strength and  "muscular endurance for 35 minutes including the following exercises:   -She demonstrated good understanding and technique of HEP as documented above.  -She performed dynamic exercises on the MedX lumbar extension in conjunction with isometric testing     Assessment   This is a 78 y.o. female referred to [facility] and presents with a medical diagnosis of "lumbar spondylosis" and demonstrates limitations as described below in the problem list. Patient rehab potential is Fair. Pt presents with significant pain/limitation of upright activities due to advancing arthritis of the Right knee; she has had a Left TKA in the past. She has general restriction in gross spinal mobility, but specific patterns of pain nor relief. Notably she has poor hip flexion strength and poor testing of the hip extensors with supine bridge test. Her static and dynamic standing balance is fair with some concern for fall risk; she typically will use a cane "to be safe." Patient should benefit from general progressive strengthening plan and direct focus on improving stability of the core musculature to include the back extensors, hip stabilizers, and abdominals. Plan to advance per MedX lumbar extension protocol in association with progression of HEP as outlined.     Pain Pattern: 1 PEN       Patient received education on the [facility] program, purpose of the isometric test, progression of back strengthening as well as wellness approach and systemic strengthening.  Details of the program were discussed.  Reviewed that patient should feel support/pressure from med ex restraints but no pain or discomfort and patient expressed understanding.    Based on the above history and physical examination an active physical therapy program is recommended.  Pt will continue to benefit from skilled outpatient physical therapy to address the deficits listed below in the chart, provide pt/family education and to maximize pt's level of independence in " the home and community environment. .     No environmental, cultural, spiritual, developmental or education needs expressed or noted    Medical necessity is demonstrated by the following problem list.    Pt presents with the following impairments:     History  Co-morbidities and personal factors that may impact the plan of care Co-morbidities:   advanced age, anxiety, CAD, CHF, diabetes, high BMI, HTN and Prior Left TKA with need for Right TKA currently    Personal Factors:   age     moderate   Examination  Body Structures and Functions, activity limitations and participation restrictions that may impact the plan of care Body Regions:   neck  back  lower extremities    Body Systems:    ROM  strength  gross coordinated movement  balance  gait  transfers  motor control    Participation Restrictions:   Limited by pain and weakness; higher fall risk    Activity limitations:   Learning and applying knowledge  listening    General Tasks and Commands  no deficits    Communication  communicating with/receiving spoken language    Mobility  lifting and carrying objects  walking    Self care  no deficits    Domestic Life  cooking  doing house work (cleaning house, washing dishes, laundry)    Interactions/Relationships  no deficits    Life Areas  no deficits    Community and Social Life  recreation and leisure  Shinto and spirituality         moderate   Clinical Presentation evolving clinical presentation with changing clinical characteristics moderate   Decision Making/ Complexity Score: moderate       GOALS: Pt is in agreement with the following goals.    Short term goals:  6 weeks or 10 visits   1.  Pt will demonstrate increased lumbar ROM by at least 3 degrees from the initial ROM value with improvements noted in functional ROM and ability to perform ADLs  2.  Pt will demonstrate increased maximum isometric torque value by 8% when compared to the initial value resulting in improved ability to perform bending, lifting, and  "carrying activities safely, confidently.    3.  Patient report a reduction in worst pain score by 1-2 points for improved tolerance during work and recreational activities  4.  Pt able to perform HEP correctly with minimal cueing or supervision for therapist      Long term goals: 13 weeks or 20 visits   1. Pt will demonstrate increased lumbar ROM by at least 6 degrees from initial ROM value, resulting in improved ability to perform functional fwd bending while standing and sitting.   2. Pt will demonstrate increased maximum isometric torque value by 15% when compared to the initial value resulting in improved ability to perform bending, lifting, and carrying activities safely, confidently.  3. Pt to demonstrate ability to independently control and reduce their pain through posture positioning and mechanical movements throughout a typical day.  4.  Patient will demonstrate improved overall function per FOTO Survey to CK = at least 40% but < 60% impaired, limited or restricted score or less.      Plan   Outpatient physical therapy 2x week for 13 weeks or 20 visits to include the following:   - Patient education  - Therapeutic exercise  - Manual therapy  - Performance testing   - Neuromuscular Re-education  - Therapeutic activity   - Modalities    Pt may be seen by PTA as part of the rehabilitation team.     Therapist: Yaniv Alarcon, PT  1/31/2019    "I certify the need for these services furnished under this plan of treatment and while under my care."    ____________________________________  Physician/Referring Practitioner    _______________  Date of Signature            "

## 2019-02-13 ENCOUNTER — OFFICE VISIT (OUTPATIENT)
Dept: INTERNAL MEDICINE | Facility: CLINIC | Age: 79
End: 2019-02-13
Payer: MEDICARE

## 2019-02-13 ENCOUNTER — LAB VISIT (OUTPATIENT)
Dept: LAB | Facility: HOSPITAL | Age: 79
End: 2019-02-13
Attending: INTERNAL MEDICINE
Payer: MEDICARE

## 2019-02-13 VITALS
BODY MASS INDEX: 32.26 KG/M2 | DIASTOLIC BLOOD PRESSURE: 80 MMHG | SYSTOLIC BLOOD PRESSURE: 138 MMHG | WEIGHT: 212.88 LBS | HEART RATE: 74 BPM | HEIGHT: 68 IN

## 2019-02-13 DIAGNOSIS — E21.3 HYPERPARATHYROIDISM: ICD-10-CM

## 2019-02-13 DIAGNOSIS — Z01.419 GYNECOLOGIC EXAM NORMAL: ICD-10-CM

## 2019-02-13 DIAGNOSIS — K21.9 GASTROESOPHAGEAL REFLUX DISEASE WITHOUT ESOPHAGITIS: ICD-10-CM

## 2019-02-13 DIAGNOSIS — Z79.4 TYPE 2 DIABETES MELLITUS WITH STAGE 3 CHRONIC KIDNEY DISEASE, WITH LONG-TERM CURRENT USE OF INSULIN: ICD-10-CM

## 2019-02-13 DIAGNOSIS — E78.00 HYPERCHOLESTEROLEMIA: ICD-10-CM

## 2019-02-13 DIAGNOSIS — E11.22 TYPE 2 DIABETES MELLITUS WITH STAGE 3 CHRONIC KIDNEY DISEASE, WITH LONG-TERM CURRENT USE OF INSULIN: ICD-10-CM

## 2019-02-13 DIAGNOSIS — I10 ESSENTIAL HYPERTENSION: Primary | Chronic | ICD-10-CM

## 2019-02-13 DIAGNOSIS — M47.819 FACET ARTHROPATHY: ICD-10-CM

## 2019-02-13 DIAGNOSIS — E13.9 DIABETES MELLITUS DUE TO ABNORMAL INSULIN: ICD-10-CM

## 2019-02-13 DIAGNOSIS — M25.562 ACUTE PAIN OF LEFT KNEE: ICD-10-CM

## 2019-02-13 DIAGNOSIS — I50.30 DIASTOLIC CONGESTIVE HEART FAILURE, UNSPECIFIED HF CHRONICITY: ICD-10-CM

## 2019-02-13 DIAGNOSIS — N18.30 TYPE 2 DIABETES MELLITUS WITH STAGE 3 CHRONIC KIDNEY DISEASE, WITH LONG-TERM CURRENT USE OF INSULIN: ICD-10-CM

## 2019-02-13 DIAGNOSIS — K57.32 DIVERTICULITIS OF LARGE INTESTINE WITHOUT PERFORATION OR ABSCESS WITHOUT BLEEDING: ICD-10-CM

## 2019-02-13 DIAGNOSIS — I25.119 CORONARY ARTERY DISEASE INVOLVING NATIVE CORONARY ARTERY OF NATIVE HEART WITH ANGINA PECTORIS: ICD-10-CM

## 2019-02-13 DIAGNOSIS — G89.29 OTHER CHRONIC BACK PAIN: Chronic | ICD-10-CM

## 2019-02-13 DIAGNOSIS — I70.0 AORTIC ATHEROSCLEROSIS: ICD-10-CM

## 2019-02-13 DIAGNOSIS — F39 MOOD DISORDER: ICD-10-CM

## 2019-02-13 DIAGNOSIS — M54.9 OTHER CHRONIC BACK PAIN: Chronic | ICD-10-CM

## 2019-02-13 DIAGNOSIS — E78.5 HYPERLIPIDEMIA, UNSPECIFIED HYPERLIPIDEMIA TYPE: ICD-10-CM

## 2019-02-13 LAB
ALBUMIN SERPL BCP-MCNC: 3.4 G/DL
ALP SERPL-CCNC: 83 U/L
ALT SERPL W/O P-5'-P-CCNC: 12 U/L
ANION GAP SERPL CALC-SCNC: 8 MMOL/L
AST SERPL-CCNC: 16 U/L
BASOPHILS # BLD AUTO: 0.05 K/UL
BASOPHILS NFR BLD: 0.6 %
BILIRUB SERPL-MCNC: 0.5 MG/DL
BUN SERPL-MCNC: 10 MG/DL
CALCIUM SERPL-MCNC: 10 MG/DL
CHLORIDE SERPL-SCNC: 104 MMOL/L
CO2 SERPL-SCNC: 32 MMOL/L
CREAT SERPL-MCNC: 1.2 MG/DL
CRP SERPL-MCNC: 7.7 MG/L
DIFFERENTIAL METHOD: ABNORMAL
EOSINOPHIL # BLD AUTO: 0.2 K/UL
EOSINOPHIL NFR BLD: 2.7 %
ERYTHROCYTE [DISTWIDTH] IN BLOOD BY AUTOMATED COUNT: 14.1 %
EST. GFR  (AFRICAN AMERICAN): 50 ML/MIN/1.73 M^2
EST. GFR  (NON AFRICAN AMERICAN): 43 ML/MIN/1.73 M^2
ESTIMATED AVG GLUCOSE: 163 MG/DL
GLUCOSE SERPL-MCNC: 128 MG/DL
HBA1C MFR BLD HPLC: 7.3 %
HCT VFR BLD AUTO: 42.3 %
HGB BLD-MCNC: 12.9 G/DL
LYMPHOCYTES # BLD AUTO: 2.1 K/UL
LYMPHOCYTES NFR BLD: 23.3 %
MCH RBC QN AUTO: 27 PG
MCHC RBC AUTO-ENTMCNC: 30.5 G/DL
MCV RBC AUTO: 89 FL
MONOCYTES # BLD AUTO: 0.7 K/UL
MONOCYTES NFR BLD: 7.4 %
NEUTROPHILS # BLD AUTO: 6 K/UL
NEUTROPHILS NFR BLD: 65.8 %
PLATELET # BLD AUTO: 278 K/UL
PMV BLD AUTO: 9.9 FL
POTASSIUM SERPL-SCNC: 4.1 MMOL/L
PROT SERPL-MCNC: 7.9 G/DL
RBC # BLD AUTO: 4.77 M/UL
SODIUM SERPL-SCNC: 144 MMOL/L
TSH SERPL DL<=0.005 MIU/L-ACNC: 1.37 UIU/ML
URATE SERPL-MCNC: 7.6 MG/DL
WBC # BLD AUTO: 9.05 K/UL

## 2019-02-13 PROCEDURE — 3075F PR MOST RECENT SYSTOLIC BLOOD PRESS GE 130-139MM HG: ICD-10-PCS | Mod: HCNC,CPTII,S$GLB, | Performed by: INTERNAL MEDICINE

## 2019-02-13 PROCEDURE — 99999 PR PBB SHADOW E&M-EST. PATIENT-LVL III: ICD-10-PCS | Mod: PBBFAC,HCNC,, | Performed by: INTERNAL MEDICINE

## 2019-02-13 PROCEDURE — 85025 COMPLETE CBC W/AUTO DIFF WBC: CPT | Mod: HCNC

## 2019-02-13 PROCEDURE — 86140 C-REACTIVE PROTEIN: CPT | Mod: HCNC

## 2019-02-13 PROCEDURE — 99499 UNLISTED E&M SERVICE: CPT | Mod: HCNC,S$GLB,, | Performed by: INTERNAL MEDICINE

## 2019-02-13 PROCEDURE — 84550 ASSAY OF BLOOD/URIC ACID: CPT | Mod: HCNC

## 2019-02-13 PROCEDURE — 84443 ASSAY THYROID STIM HORMONE: CPT | Mod: HCNC

## 2019-02-13 PROCEDURE — 3079F DIAST BP 80-89 MM HG: CPT | Mod: HCNC,CPTII,S$GLB, | Performed by: INTERNAL MEDICINE

## 2019-02-13 PROCEDURE — 99214 PR OFFICE/OUTPT VISIT, EST, LEVL IV, 30-39 MIN: ICD-10-PCS | Mod: HCNC,S$GLB,, | Performed by: INTERNAL MEDICINE

## 2019-02-13 PROCEDURE — 99499 RISK ADDL DX/OHS AUDIT: ICD-10-PCS | Mod: HCNC,S$GLB,, | Performed by: INTERNAL MEDICINE

## 2019-02-13 PROCEDURE — 1101F PT FALLS ASSESS-DOCD LE1/YR: CPT | Mod: HCNC,CPTII,S$GLB, | Performed by: INTERNAL MEDICINE

## 2019-02-13 PROCEDURE — 80053 COMPREHEN METABOLIC PANEL: CPT | Mod: HCNC

## 2019-02-13 PROCEDURE — 83036 HEMOGLOBIN GLYCOSYLATED A1C: CPT | Mod: HCNC

## 2019-02-13 PROCEDURE — 3079F PR MOST RECENT DIASTOLIC BLOOD PRESSURE 80-89 MM HG: ICD-10-PCS | Mod: HCNC,CPTII,S$GLB, | Performed by: INTERNAL MEDICINE

## 2019-02-13 PROCEDURE — 36415 COLL VENOUS BLD VENIPUNCTURE: CPT | Mod: HCNC

## 2019-02-13 PROCEDURE — 1101F PR PT FALLS ASSESS DOC 0-1 FALLS W/OUT INJ PAST YR: ICD-10-PCS | Mod: HCNC,CPTII,S$GLB, | Performed by: INTERNAL MEDICINE

## 2019-02-13 PROCEDURE — 99214 OFFICE O/P EST MOD 30 MIN: CPT | Mod: HCNC,S$GLB,, | Performed by: INTERNAL MEDICINE

## 2019-02-13 PROCEDURE — 3075F SYST BP GE 130 - 139MM HG: CPT | Mod: HCNC,CPTII,S$GLB, | Performed by: INTERNAL MEDICINE

## 2019-02-13 PROCEDURE — 99999 PR PBB SHADOW E&M-EST. PATIENT-LVL III: CPT | Mod: PBBFAC,HCNC,, | Performed by: INTERNAL MEDICINE

## 2019-02-13 RX ORDER — OXYCODONE AND ACETAMINOPHEN 10; 325 MG/1; MG/1
1 TABLET ORAL
Qty: 150 TABLET | Refills: 0 | Status: SHIPPED | OUTPATIENT
Start: 2019-02-13 | End: 2019-04-12

## 2019-02-13 RX ORDER — OXYCODONE AND ACETAMINOPHEN 10; 325 MG/1; MG/1
TABLET ORAL
Qty: 150 TABLET | Refills: 0 | Status: SHIPPED | OUTPATIENT
Start: 2019-02-13 | End: 2019-03-12 | Stop reason: SDUPTHER

## 2019-02-13 NOTE — PROGRESS NOTES
CHIEF COMPLAINT: Follow up of leg pain, dizziness, back pain, hypertension, diabetes     HISTORY OF PRESENT ILLNESS: This is a 78-year-old woman who presents for follow up of above.    She had to go to [place] last week. Her brother who was 80  from complications of dementia. She states it felt good to get out of the state. She feels better since she returned.     She had an injection in the right knee 19. She will eventually need a knee replacement. The injection helped her right knee.. She had a left knee replacement .     She has occasional abdominal pain. No pain recently.. Appetite is ok. Her abdominal pain resolved after a course of doxycycline for diverticulitis.   Occasional nausea - 1-2 times since our last visit..  NO vomiting, constipation, diarrhea, dysuria or hematuria. She is taking miralax 1 capful daily as needed      She is taking Cymbalta 60 mg daily, Oxycodone apap 10/325 3-4 times daily, Pantoprazole 40 mg once daily, carvedilol 25 mg twice daily, Vitamin D3 2000 units daily, Loratadine 10 mg daily as needed, Aspirin 81 mg daily, Furosemide 40 mg once daily, potassium 10 meq twice daily.       Breathing has been ok. She has not needed her albuterol breathing treatments lately. No mucous production.  No fever or chills.      Mood has been worse lately. She lives alone. Money has been tight. NO homicidal or suicidal ideations. She tried Wellbutrin - she states it caused nausea vomiting and diarrhea so she stopped it. She continues to take CYmabalta 60 mg daily.      She is taking Lantus 60 units in the evening and Novolog 23 units once or twice daily with meals.  No hypoglycemia.  Averages 130-140/     Heartburn is controlled. She is taking pantoprazole 40 mg twice daily .     She continues to have low back pain. will starting therapy again in January.  She saw Dr Le 18 and had MRI lumbar spine 18 - Moderate degenerative change at L4-5.  Dr Le recommended epidural  - she is not sure if she wants to do the epidural. She takes oxycodone apap 10/325 one tablet 3-5 times daily depending on her pain.. SHe takes Cymbalta 60 mg daily which helps her pain and her mood.  She took gabapentin in the past but it made her feel bad. She takes tizanidine 4 mg 1/2 tablet at bedtime as needed for muscle spasm. She is in physical therapy for her back.        She continues to take aspirin 81 mg daily, coreg 25 mg twice daily and lasix 40 mg once daily with potassium chloride 10 meq once or twice a day  for her diastolic dysfunction and hypertension. She denies lightheadedness, chest pain or shortness of breath. She is not taking atorvastatin 40 mg once daily for her cholesterol.      Grandson still is living with her. He got his CDL license and wants to drive trucks.              PAST MEDICAL HISTORY:   1. Diabetes mellitus   2. Hypertension   3. Hyperlipidemia   4. Left heart catheterization January 2007 which revealed luminal irregularities in the LAD, left circumflex and right coronary artery. She had diastolic dysfunction and patent renal arteries.   5. Sleep apnea   6. Obesity.   7. Nephrolithiasis status post lithotripsy.   8. Reflux - had nonerosive gastropathy on EGD September 2007. Gastritis on EGD 9/2010  9. Hidradenitis suppurativa.   10. History of diverticulitis with a hospitalized October 2007.   11. Migraines   12. Obesity.   13. 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    14. Total hysterectomy in 1969.   15. Cholecystectomy was done at that time as well.   16. 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   17. Colon polyps on colonscopy 11/2010 - due 2013   18. Hospitalization 12/10 for e coli sepsis due to left ureteral stone with left nephrostomy tube in [place], [place]   19. Left knee replacement 9/2012      MEDICATIONS and ALLERGIES: Updated on EPIC.     PHYSICAL EXAMINATION:   /80   Pulse 74    "Ht 5' 8" (1.727 m)   Wt 96.6 kg (212 lb 13.7 oz)   LMP  (LMP Unknown)   BMI 32.36 kg/m²        GENERAL: She is alert, oriented, no apparent distress. Affect within   normal limits.  Conjunctiva anicteric. . Oropharynx clear.   NECK: Supple.   Respiratory: Effort normal. Lungs clear  HEART: Regular rate and rhythm without murmurs, gallops or rubs.   No lower extremity edema.    ABDOMEN: soft, non distended, non tender, bowel sounds present, no hepatosplenomgaly  .            ASSESSMENT AND PLAN:   1.Knee pain - better after injection.   2.  Mood disorder - continue cymbalta for now.   3. neck, shoulder and back pain -[facility] physical [facility]  4. Asthma - stable   6. HTN -stable  7. Gerd - take pantoprazole 40 mg twicve daily  8. Diabetes with neuropathy- watch sugars  9. Hyperlipidemia - off lipitor  10. Allergic rhinitis - stable  11. Diastolic congestive heart failure - stable.    12. CRI -stable  13. Obesity - discussed diet, exercise and weight loss  14. CAD -risk factor modification  15. Aortic atherosclerosis and possible mesenteric artery stenosis- risk factor modification  16. Tortuous aorta - HTN controlled  17. Colon polyps - Colonoscopy in 8/2013 - 2 polyps. Flex sig 11/2013 - mild granular mucosa.  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 Elevated uric acid level - no signs of gout currently. Will montior for now.   20. Diverticulosis -asx currently  21. GYN for cyst in vaginal area   Recommended colonoscopy in 8/2018. Attempted colonoscopy 11/2016 du to diarrhea which has resolved. She had poor prep with hyperplastic rectal biopsy.   MMG 1/18  Prevnar 10/15 and flu shot 11/16  I will see her back in 4 weeks sooner if issues. .  "

## 2019-02-14 ENCOUNTER — TELEPHONE (OUTPATIENT)
Dept: INTERNAL MEDICINE | Facility: CLINIC | Age: 79
End: 2019-02-14

## 2019-02-14 NOTE — TELEPHONE ENCOUNTER
----- Message from Jeannine Arriaga MD sent at 2/13/2019  9:58 PM CST -----  Pleaes notify pt  Your blood count, blood sugar, kidney function, liver function,  thyroid function, inflammation rates, uric acid level (gout level) are fine  Diabetes is controlled with a hemoglobin A1C of 7.3 - or average blood sugar of 163

## 2019-02-20 ENCOUNTER — PES CALL (OUTPATIENT)
Dept: ADMINISTRATIVE | Facility: CLINIC | Age: 79
End: 2019-02-20

## 2019-02-21 ENCOUNTER — TELEPHONE (OUTPATIENT)
Dept: OPTOMETRY | Facility: CLINIC | Age: 79
End: 2019-02-21

## 2019-02-21 ENCOUNTER — HOSPITAL ENCOUNTER (OUTPATIENT)
Dept: RADIOLOGY | Facility: HOSPITAL | Age: 79
Discharge: HOME | End: 2019-02-21
Attending: INTERNAL MEDICINE
Payer: MEDICARE

## 2019-02-21 ENCOUNTER — OFFICE VISIT (OUTPATIENT)
Dept: INTERNAL MEDICINE | Facility: CLINIC | Age: 79
End: 2019-02-21
Payer: MEDICARE

## 2019-02-21 ENCOUNTER — TELEPHONE (OUTPATIENT)
Dept: INTERNAL MEDICINE | Facility: CLINIC | Age: 79
End: 2019-02-21

## 2019-02-21 VITALS
DIASTOLIC BLOOD PRESSURE: 82 MMHG | OXYGEN SATURATION: 98 % | TEMPERATURE: 98 F | HEART RATE: 76 BPM | BODY MASS INDEX: 32.38 KG/M2 | WEIGHT: 213.63 LBS | HEIGHT: 68 IN | SYSTOLIC BLOOD PRESSURE: 150 MMHG

## 2019-02-21 DIAGNOSIS — R55: Primary | ICD-10-CM

## 2019-02-21 DIAGNOSIS — R41.0 CONFUSION: ICD-10-CM

## 2019-02-21 PROCEDURE — 1101F PR PT FALLS ASSESS DOC 0-1 FALLS W/OUT INJ PAST YR: ICD-10-PCS | Mod: HCNC,CPTII,S$GLB, | Performed by: INTERNAL MEDICINE

## 2019-02-21 PROCEDURE — 99213 OFFICE O/P EST LOW 20 MIN: CPT | Mod: HCNC,S$GLB,, | Performed by: INTERNAL MEDICINE

## 2019-02-21 PROCEDURE — 3077F PR MOST RECENT SYSTOLIC BLOOD PRESSURE >= 140 MM HG: ICD-10-PCS | Mod: HCNC,CPTII,S$GLB, | Performed by: INTERNAL MEDICINE

## 2019-02-21 PROCEDURE — 70450 CT HEAD WITHOUT CONTRAST: ICD-10-PCS | Mod: 26,HCNC,, | Performed by: RADIOLOGY

## 2019-02-21 PROCEDURE — 3079F DIAST BP 80-89 MM HG: CPT | Mod: HCNC,CPTII,S$GLB, | Performed by: INTERNAL MEDICINE

## 2019-02-21 PROCEDURE — 70450 CT HEAD/BRAIN W/O DYE: CPT | Mod: 26,HCNC,, | Performed by: RADIOLOGY

## 2019-02-21 PROCEDURE — 99999 PR PBB SHADOW E&M-EST. PATIENT-LVL III: CPT | Mod: PBBFAC,HCNC,, | Performed by: INTERNAL MEDICINE

## 2019-02-21 PROCEDURE — 99999 PR PBB SHADOW E&M-EST. PATIENT-LVL III: ICD-10-PCS | Mod: PBBFAC,HCNC,, | Performed by: INTERNAL MEDICINE

## 2019-02-21 PROCEDURE — 3079F PR MOST RECENT DIASTOLIC BLOOD PRESSURE 80-89 MM HG: ICD-10-PCS | Mod: HCNC,CPTII,S$GLB, | Performed by: INTERNAL MEDICINE

## 2019-02-21 PROCEDURE — 70450 CT HEAD/BRAIN W/O DYE: CPT | Mod: TC,HCNC

## 2019-02-21 PROCEDURE — 99213 PR OFFICE/OUTPT VISIT, EST, LEVL III, 20-29 MIN: ICD-10-PCS | Mod: HCNC,S$GLB,, | Performed by: INTERNAL MEDICINE

## 2019-02-21 PROCEDURE — 1101F PT FALLS ASSESS-DOCD LE1/YR: CPT | Mod: HCNC,CPTII,S$GLB, | Performed by: INTERNAL MEDICINE

## 2019-02-21 PROCEDURE — 3077F SYST BP >= 140 MM HG: CPT | Mod: HCNC,CPTII,S$GLB, | Performed by: INTERNAL MEDICINE

## 2019-02-21 NOTE — TELEPHONE ENCOUNTER
----- Message from Keyla Verduzco sent at 2/21/2019  1:40 PM CST -----  Contact: SELF/[phone]  Pt called in regards to blacking out on Friday and wanted to talk to the dr about it. She lost memory for about 2 hrs. I sent the call to the [place].      Please advise

## 2019-02-21 NOTE — PROGRESS NOTES
Subjective:       Patient ID: Henrietta Villasenor is a 79 y.o. female.    Chief Complaint: Loss of Consciousness    INsulin dependent diabetic reports that she went to [facility] to renew her 's lic 1 week ago.  She left the [facility], got in her car about 1:30 pm and the next thing she knew, it was after 5 pm and she was still in her car at [facility].  Repoerts she has a mild right frontal headache, but nothing else.  No fall, didn't hit head.  Had mastoid tumor removed in 1969 resulting in loss of movement in left face.      Review of Systems   Constitutional: Negative for activity change, chills, fatigue and fever.   HENT: Negative for congestion, ear pain, nosebleeds, postnasal drip, sinus pressure and sore throat.    Eyes: Negative.  Negative for visual disturbance.   Respiratory: Negative for cough, chest tightness, shortness of breath and wheezing.    Cardiovascular: Negative for chest pain.   Gastrointestinal: Negative for abdominal pain, diarrhea, nausea and vomiting.   Genitourinary: Negative for difficulty urinating, dysuria, frequency and urgency.   Musculoskeletal: Negative for arthralgias and neck stiffness.   Skin: Negative for rash.   Neurological: Negative for dizziness, weakness and headaches.   Psychiatric/Behavioral: Negative for sleep disturbance. The patient is not nervous/anxious.        Objective:      Physical Exam   Constitutional: She is oriented to person, place, and time. She appears well-developed and well-nourished.  Non-toxic appearance. No distress.   HENT:   Head: Normocephalic and atraumatic.   Right Ear: Tympanic membrane, external ear and ear canal normal.   Left Ear: Tympanic membrane, external ear and ear canal normal.   Eyes: EOM are normal. Pupils are equal, round, and reactive to light. No scleral icterus.   Neck: Normal range of motion. Neck supple. No thyromegaly present.   Cardiovascular: Normal rate, regular rhythm and normal heart sounds.   Pulmonary/Chest: Effort normal and breath  sounds normal.   Abdominal: Soft. Bowel sounds are normal. She exhibits no mass. There is no tenderness. There is no rebound.   Musculoskeletal: Normal range of motion.   Lymphadenopathy:     She has no cervical adenopathy.   Neurological: She is alert and oriented to person, place, and time. She has normal reflexes. She displays normal reflexes. No cranial nerve deficit. She exhibits normal muscle tone. Coordination normal.   Skin: Skin is warm and dry.   Psychiatric: She has a normal mood and affect. Her behavior is normal.       Assessment:       1. Loss of consciousness of moderate duration    2. Confusion        Plan:   Henrietta was seen today for loss of consciousness.    Diagnoses and all orders for this visit:    Loss of consciousness of moderate duration    Confusion  -     CT Head Without Contrast; Future

## 2019-02-21 NOTE — TELEPHONE ENCOUNTER
Pt states that she fell last week. Everything went dark and then she was ok. Pt has been having a headache off and on, she would like to be seen this afternoon for a headache. She feels fine other wise. appt booked with Dr. Ty for this afternoon. Please advise

## 2019-02-22 ENCOUNTER — TELEPHONE (OUTPATIENT)
Dept: INTERNAL MEDICINE | Facility: CLINIC | Age: 79
End: 2019-02-22

## 2019-03-01 ENCOUNTER — CLINICAL SUPPORT (OUTPATIENT)
Dept: REHABILITATION | Facility: OTHER | Age: 79
End: 2019-03-01
Attending: PHYSICAL MEDICINE & REHABILITATION
Payer: MEDICARE

## 2019-03-01 DIAGNOSIS — M47.816 LUMBAR SPONDYLOSIS: ICD-10-CM

## 2019-03-01 PROCEDURE — 97110 THERAPEUTIC EXERCISES: CPT | Mod: HCNC

## 2019-03-01 NOTE — PROGRESS NOTES
"[facility]      Name: Henrietta Villasenor  Clinic Number: [id number]    Therapy Diagnosis:   Encounter Diagnosis   Name Primary?    Lumbar spondylosis      Physician: Lizy Le, *    Visit Date: 3/1/2019    Precautions: Fall, HTN, CAD, CHF, DM II     Pattern of pain determined: 1 PEN    Medical Dx:  M48.061 (ICD-10-CM) - Spinal stenosis of lumbar region, unspecified whether neurogenic claudication present  M51.36 (ICD-10-CM) - DDD (degenerative disc disease), lumbar      Treatment Orders: PT Eval and Treat  Evaluation Date: 2019  Authorization Period Expiration: 2019  Plan of Care Expiration:   Reassessment Due: 2019  Visit # / Visits authorized:      Time In:  933  Time Out: 1030  Total Billable Time: 57 minutes     Subjective   Henrietta  Arrives to PT and reports 6/10 LBP stating "it is not that bad".       Patient reports tolerating previous visit fair.   Patient reports their pain to be 6/10 on a 0-10 scale with 0 being no pain and 10 being the worst pain imaginable.  Pain Location:  Right knee joint and mid-line low back pain     Occupation:  Retired; Previously a cook   Leisure: Go to [employer] and various social functions-- "I feel limited by my back pain."                     Patients goals:  "I would like to be able to walk for at least 10 blocks again."    Objective     Baseline Isometric Testing on Med X equipment: Testing administered by PT  Date of testin2019  ROM 42-0 deg   Max Peak Torque 164 ft/lbs    Min Peak Torque 24 ft/lbs    Flex/Ext Ratio 6.83   % below normative data -53%   Counter weight 275   Femur 5   Seat pad 0      CMS Impairment/Limitation/Restriction for FOTO LUMBAR Survey     Therapist reviewed FOTO scores for Henrietta Villasenor on 2019.   FOTO documents entered into [employer] - see Media section.     Limitation Score: 55%  Category: Mobility     CMS Impairment/Limitation/Restriction for FOTO Lumbar Spine " Survey                      Status                    Limitation           G-Code CMS Severity Modifier  Intake           45%                       55%                   Current Status CK - At least 40 percent but less than 60 percent  Predicted      51%                      49%                   Goal Status+ CK - At least 40 percent but less than 60 percent          Treatment        Henrietta received therapeutic exercises to develop/improve posture, lumbar/cervical ROM, strength and muscular endurance for 55 minutes including the following exercises:     [facility] 3/1/2019   Visit Number 2   VAS Pain Rating 6   Recumbent Bike Seat Pos. -   Time 10   Extension in Lying -   Extension in Standing -   Flexion in Lying 10   Flexion in Sitting -   Lumbar Extension Seat Pad -   Femur Restraint -   Top Dead Center -   Counterweight -   Lumbar Flexion 30   Lumbar Extension 0   Lumbar Peak Torque -   Lumbar Weight 40   Repetitions 18   Rating of Perceived Exertion 4   Ice - Z Lie (in min.) 10       LTR with ball x10  PPTx10  MOUSTAPHA with ball x10     Peripheral muscle strengthening which included 1 set of 15-20 repetitions at a slow, controlled 10-13 second per rep pace focused on strengthening supporting musculature for improved body mechanics and functional mobility.  Pt and therapist focused on proper form during treatment to ensure optimal strengthening of each targeted muscle group.  Machines were utilized including torso rotation, leg extension, leg curl, chest press, upright row. Tricep extension, bicep curl, leg press, and hip abduction added visit 3      Home Exercise Program as follows:   -LTR, Pelvic Tilts, Red Theraband Supine Clams and Marching, Bridging, Seated Trunk Flexion/Extension  Handouts were given to the patient. Pt demo good understanding of the education provided. Henrietta demonstrated good return demonstration of activities.     Assessment     Patient arrived with a 6/10 LBP today; following warm  up and stretches, her pain was at 4/10. She completed 18 reps on Lumbar extension medx at 40#, with an RPE of 4 today. She had difficltuly following the pacer, so PT took out pacer. Progress as tolerated.     Patient is making good progress towards established goals.  Pt will continue to benefit from skilled outpatient physical therapy to address the deficits stated in the impairment chart, provide pt/family education and to maximize pt's level of independence in the home and community environment.       Pt's spiritual, cultural and educational needs considered and pt agreeable to plan of care and goals as stated below:     Medical necessity is demonstrated by the following problem list.    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     Personal Factors:   age       moderate   Examination  Body Structures and Functions, activity limitations and participation restrictions that may impact the plan of care Body Regions:   neck  back  lower extremities     Body Systems:    ROM  strength  gross coordinated movement  balance  gait  transfers  motor control     Participation Restrictions:   Limited by pain and weakness; higher fall risk     Activity limitations:   Learning and applying knowledge  listening     General Tasks and Commands  no deficits     Communication  communicating with/receiving spoken language     Mobility  lifting and carrying objects  walking     Self care  no deficits     Domestic Life  cooking  doing house work (cleaning house, washing dishes, laundry)     Interactions/Relationships  no deficits     Life Areas  no deficits     Community and Social Life  recreation and leisure  Oriental orthodox and spirituality             moderate   Clinical Presentation evolving clinical presentation with changing  clinical characteristics moderate   Decision Making/ Complexity Score: moderate         GOALS: Pt is in agreement with the following goals.     Short term goals:  6 weeks or 10 visits   1.  Pt will demonstrate increased lumbar ROM by at least 3 degrees from the initial ROM value with improvements noted in functional ROM and ability to perform ADLs  2.  Pt will demonstrate increased maximum isometric torque value by 8% when compared to the initial value resulting in improved ability to perform bending, lifting, and carrying activities safely, confidently.     3.  Patient report a reduction in worst pain score by 1-2 points for improved tolerance during work and recreational activities  4.  Pt able to perform HEP correctly with minimal cueing or supervision for therapist        Long term goals: 13 weeks or 20 visits   1. Pt will demonstrate increased lumbar ROM by at least 6 degrees from initial ROM value, resulting in improved ability to perform functional fwd bending while standing and sitting.   2. Pt will demonstrate increased maximum isometric torque value by 15% when compared to the initial value resulting in improved ability to perform bending, lifting, and carrying activities safely, confidently.  3. Pt to demonstrate ability to independently control and reduce their pain through posture positioning and mechanical movements throughout a typical day.  4.  Patient will demonstrate improved overall function per FOTO Survey to CK = at least 40% but < 60% impaired, limited or restricted score or less.    Plan   Continue with established Plan of Care towards established PT goals.

## 2019-03-04 ENCOUNTER — OFFICE VISIT (OUTPATIENT)
Dept: OBSTETRICS AND GYNECOLOGY | Facility: CLINIC | Age: 79
End: 2019-03-04
Attending: OBSTETRICS & GYNECOLOGY
Payer: MEDICARE

## 2019-03-04 ENCOUNTER — CLINICAL SUPPORT (OUTPATIENT)
Dept: REHABILITATION | Facility: OTHER | Age: 79
End: 2019-03-04
Attending: PHYSICAL MEDICINE & REHABILITATION
Payer: MEDICARE

## 2019-03-04 VITALS
BODY MASS INDEX: 33.12 KG/M2 | DIASTOLIC BLOOD PRESSURE: 74 MMHG | SYSTOLIC BLOOD PRESSURE: 130 MMHG | WEIGHT: 218.5 LBS | HEIGHT: 68 IN

## 2019-03-04 DIAGNOSIS — Z12.31 SCREENING MAMMOGRAM, ENCOUNTER FOR: ICD-10-CM

## 2019-03-04 DIAGNOSIS — Z01.419 ENCOUNTER FOR ROUTINE GYNECOLOGIC EXAMINATION IN MEDICARE PATIENT: Primary | ICD-10-CM

## 2019-03-04 DIAGNOSIS — N90.89 VULVAR LESION: ICD-10-CM

## 2019-03-04 DIAGNOSIS — M47.816 LUMBAR SPONDYLOSIS: ICD-10-CM

## 2019-03-04 PROCEDURE — 56605 BIOPSY OF VULVA/PERINEUM: CPT | Mod: HCNC,S$GLB,, | Performed by: OBSTETRICS & GYNECOLOGY

## 2019-03-04 PROCEDURE — G0101 CA SCREEN;PELVIC/BREAST EXAM: HCPCS | Mod: HCNC,S$GLB,, | Performed by: OBSTETRICS & GYNECOLOGY

## 2019-03-04 PROCEDURE — 88305 TISSUE SPECIMEN TO PATHOLOGY, OBSTETRICS/GYNECOLOGY: ICD-10-PCS | Mod: 26,HCNC,, | Performed by: PATHOLOGY

## 2019-03-04 PROCEDURE — 88305 TISSUE EXAM BY PATHOLOGIST: CPT | Mod: HCNC | Performed by: PATHOLOGY

## 2019-03-04 PROCEDURE — 99999 PR PBB SHADOW E&M-EST. PATIENT-LVL III: CPT | Mod: PBBFAC,HCNC,, | Performed by: OBSTETRICS & GYNECOLOGY

## 2019-03-04 PROCEDURE — 88305 TISSUE EXAM BY PATHOLOGIST: CPT | Mod: 26,HCNC,, | Performed by: PATHOLOGY

## 2019-03-04 PROCEDURE — 97110 THERAPEUTIC EXERCISES: CPT | Mod: HCNC

## 2019-03-04 PROCEDURE — 56605 BIOPSY (GYNECOLOGICAL): ICD-10-PCS | Mod: HCNC,S$GLB,, | Performed by: OBSTETRICS & GYNECOLOGY

## 2019-03-04 PROCEDURE — 99999 PR PBB SHADOW E&M-EST. PATIENT-LVL III: ICD-10-PCS | Mod: PBBFAC,HCNC,, | Performed by: OBSTETRICS & GYNECOLOGY

## 2019-03-04 PROCEDURE — G0101 PR CA SCREEN;PELVIC/BREAST EXAM: ICD-10-PCS | Mod: HCNC,S$GLB,, | Performed by: OBSTETRICS & GYNECOLOGY

## 2019-03-04 NOTE — LETTER
March 4, 2019      Jeannine Arriaga MD  [address]  [facility] [address]           [facility] [id number] [facility] [address]  [address] [address]  Phone: [phone]  Fax: [phone]          Patient: Henrietta Villasenor   MR Number: [id number]   YOB: 1940   Date of Visit: 3/4/2019       Dear Dr. Jenanine Arriaga:    Thank you for referring Henrietta Villasenor to me for evaluation. Attached you will find relevant portions of my assessment and plan of care.    If you have questions, please do not hesitate to call me. I look forward to following Henrietta Villasenor along with you.    Sincerely,    Maryana Romano MD    Enclosure  CC:  No Recipients    If you would like to receive this communication electronically, please contact [email or web address] or [phone] to request more information on [facility] access.    For providers and/or their staff who would like to refer a patient to [facility], please contact us through our one-stop-shop provider referral line, [facility], at [phone].    If you feel you have received this communication in error or would no longer like to receive these types of communications, please e-mail [email or web address]

## 2019-03-04 NOTE — PROGRESS NOTES
Subjective:       Patient ID: Henrietta Villasenor is a 79 y.o. female.    Chief Complaint:  Gynecologic Exam and Cyst (vaginal )      History of Present Illness  HPI  Henrietta Villasenor is a 79 y.o. female  here for her  GYN exam.   She is concerned about a bump on her vagina which is somewhat tender and she has multiple other dark spots on various parts of her body and is concerned about a cancer.  denies vaginal itching or irritation.  Denies vaginal discharge.  She is not sexually active.    History of abnormal pap: No  Last Pap: patient does not recall when last pap was  Last MMG: normal--routine follow-up in 12 months  Last Colonoscopy:  2017: Diverticulosis and internal hemorrhoids, advised to return in 5 years.  denies domestic violence. She does feel safe at home.     Past Medical History:   Diagnosis Date    Abnormality of lung 2011    Stable bandlike opacities at the lung bases, most likely representing      Anxiety     Arthritis     CAD (coronary artery disease)     Calculus of ureter 2011    Cataract     CHF (congestive heart failure)     Chronic back pain 2012    Colon polyp 2010; 2010    Colon polyps 2012    Coronary artery disease involving native coronary artery of native heart with angina pectoris     Depression     Diabetes mellitus     Diabetes mellitus type II     Diverticulitis large intestine 2015    Diverticulosis 2010; 2010; 2011; 2012    Duodenal disorder 2011    Duodenal erosion noted on EGD.    Duodenal ulcer, unspecified as acute or chronic, without hemorrhage, perforation, or obstruction 2011    E. coli sepsis 2010    Due to left ureteral stone with left nephrostomy tube - hospitalized in [place]    Esophageal dysmotility 2012    Noted on upper GI-barium swallow.    Facial weakness     Left facial weakness s/p left mastoidectomy in ~ .    Fatty liver 2011    Reported on CT-abdomen  and in 06/2012 Gastro clinic visit note.    Gastric polyp 09/29/2010    GERD (gastroesophageal reflux disease) 7/29/2012    Hepatomegaly 11/08/2011    Reported on CT-abdomen    Herpes zoster with other nervous system complications(053.19) 2/28/2011    Hiatal hernia 06/26/2006; 09/29/2010; 08/25/2011    Noted on barium swallow 2006; noted on  EGD 2011.   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    Trouble in sleeping     Type II or unspecified type diabetes mellitus with neurological manifestations, not stated as uncontrolled(250.60)     Type II or unspecified type diabetes mellitus with peripheral circulatory disorders, not stated as uncontrolled(250.70)     Type II or unspecified type diabetes mellitus with renal manifestations, not stated as uncontrolled(250.40)      Past Surgical History:   Procedure Laterality Date    BELPHAROPTOSIS REPAIR      s/p LAMBERTO levator repair - Dr. Dejesus    CARDIAC CATHETERIZATION      CARPAL TUNNEL RELEASE  3/13/2012    CATARACT EXTRACTION      CHOLECYSTECTOMY      COLONOSCOPY N/A 6/14/2017    [facility] ([facility])    COLONOSCOPY N/A 11/16/2016    [facility] ([facility])    COLONOSCOPY N/A 8/8/2013     [facility]    EGD (ESOPHAGOGASTRODUODENOSCOPY) N/A 2014    [facility] ([facility] FLR)    EGD (ESOPHAGOGASTRODUODENOSCOPY) N/A 2013    [facility] ([facility]LR)    ESOPHAGOGASTRODUODENOSCOPY (EGD) N/A 2015    [facility] (4[facility] FLR)    EXTRACORPOREAL SHOCK WAVE LITHOTRIPSY      INSERTION-INTRAOCULAR LENS (IOL) Right 10/31/2018    [facility]    INSERTION-INTRAOCULAR LENS (IOL) Left 3/22/2018    [facility] FLR    JOINT REPLACEMENT      mastoid tumor removal  1969    Left mastoidectomy with residual left facial weakness.    NEPHROSTOMY      OOPHORECTOMY      PHACOEMULSIFICATION-ASPIRATION-CATARACT Right 10/31/2018    [facility]    PHACOEMULSIFICATION-ASPIRATION-CATARACT Left 3/22/2018    [facility] FLR    SIGMOIDOSCOPY, FLEXIBLE N/A 2013    [facility] END[facility] (4TH FLR)    TOTAL ABDOMINAL HYSTERECTOMY  1969    TAHBSO    TOTAL KNEE ARTHROPLASTY  2012    Left     Social History     Socioeconomic History    Marital status: Single     Spouse name: Not on file    Number of children: 5    Years of education: Not on file    Highest education level: Not on file   Social Needs    Financial resource strain: Not on file    Food insecurity - worry: Not on file    Food insecurity - inability: Not on file    Transportation needs - medical: Not on file    Transportation needs - non-medical: Not on file   Occupational History    Not on file   Tobacco Use    Smoking status: Former Smoker     Packs/day: 1.00     Years: 15.00     Pack years: 15.00     Types: Cigarettes     Last attempt to quit: 2000     Years since quittin.6    Smokeless tobacco: Never Used   Substance  and Sexual Activity    Alcohol use: No    Drug use: No    Sexual activity: No   Other Topics Concern    Are you pregnant or think you may be? Not Asked    Breast-feeding Not Asked   Social History Narrative    Single with 5 children. Lives alone. Retired cook.     Family History   Problem Relation Age of Onset    Sleep apnea Sister     Diabetes Sister     Cancer Mother         brain tumor    Hypertension Mother     Heart disease Father     Heart attack Father     Dementia Brother     Lupus Brother     Frontotemporal dementia Brother     No Known Problems Daughter     No Known Problems Son     Diabetes Sister     Lupus Sister     Breast cancer Sister     Diabetes Sister     Cancer Sister         breast cancer    Heart attack Sister     Diabetes Sister     Liver cancer Brother     Drug abuse Brother     Alcohol abuse Brother     Cirrhosis Brother     Drug abuse Brother     No Known Problems Daughter     No Known Problems Son     No Known Problems Son     No Known Problems Maternal Grandmother     No Known Problems Maternal Grandfather     No Known Problems Paternal Grandmother     No Known Problems Paternal Grandfather     Diabetes Brother     Dementia Brother     Diabetes Other     Ovarian cancer Other         Niece     Breast cancer Maternal Aunt     No Known Problems Maternal Uncle     No Known Problems Paternal Aunt     No Known Problems Paternal Uncle     Glaucoma Neg Hx     Blindness Neg Hx     Celiac disease Neg Hx     Colon cancer Neg Hx     Colon polyps Neg Hx     Esophageal cancer Neg Hx     Inflammatory bowel disease Neg Hx     Liver disease Neg Hx     Rectal cancer Neg Hx     Stomach cancer Neg Hx     Ulcerative colitis Neg Hx     Melanoma Neg Hx     Multiple sclerosis Neg Hx     Psoriasis Neg Hx     Amblyopia Neg Hx     Cataracts Neg Hx     Macular degeneration Neg Hx     Retinal detachment Neg Hx     Strabismus Neg Hx     Stroke Neg Hx      "Thyroid disease Neg Hx      OB History      Para Term  AB Living    5 5 5     5    SAB TAB Ectopic Multiple Live Births            5          /74   Ht 5' 8" (1.727 m)   Wt 99.1 kg (218 lb 7.6 oz)   LMP  (LMP Unknown)   BMI 33.22 kg/m²         GYN & OB History    Date of Last Pap: No result found    OB History    Para Term  AB Living   5 5 5     5   SAB TAB Ectopic Multiple Live Births           5      # Outcome Date GA Lbr Marv/2nd Weight Sex Delivery Anes PTL Lv   5 Term         BELÉN   4 Term         BELÉN   3 Term         BELÉN   2 Term         BELÉN   1 Term         BELÉN          Review of Systems  Review of Systems   Endocrine: Negative for hot flashes.   Genitourinary: Positive for bladder incontinence and genital sores. Negative for hot flashes, urgency, vaginal discharge, vaginal pain and postmenopausal bleeding.           Objective:      Physical Exam:   Constitutional: She is oriented to person, place, and time. She appears well-developed and well-nourished.    HENT:   Head: Normocephalic and atraumatic.    Eyes: EOM are normal. Pupils are equal, round, and reactive to light.    Neck: Normal range of motion. Neck supple.    Cardiovascular: Normal rate and regular rhythm.     Pulmonary/Chest: Effort normal and breath sounds normal.        Abdominal: Soft. Bowel sounds are normal.     Genitourinary:       Pelvic exam was performed with patient supine.   Genitourinary Comments: PELVIC: Normal external female genitalia without lesions. 1 cm slightly irregular, raised mole on right labia majora. No excoriations.Normal hair distribution. Adequate perineal body, normal urethral meatus. Vagina atrophic without lesions or discharge. No significant cystocele or rectocele. Bimanual exam shows uterus and cervix to be surgically absent. Adnexa without masses or tenderness.  RECTAL: Deferred             Musculoskeletal: Normal range of motion and moves all extremeties.       Neurological: She " is alert and oriented to person, place, and time.    Skin: Skin is warm and dry.    Psychiatric: She has a normal mood and affect.              Assessment:        1. Encounter for routine gynecologic examination in Medicare patient    2. Screening mammogram, encounter for    3. Vulvar lesion                Plan:      1. Encounter for routine gynecologic examination in Medicare patient      2. Screening mammogram, encounter for    - Mammo Digital Screening Bilat w/ Prakash; Future    3. Vulvar lesion    - Biopsy (Gynecological)  - Tissue Specimen To Pathology, Obstetrics/Gynecology       Follow-up in about 2 years (around 3/4/2021), or if symptoms worsen or fail to improve.

## 2019-03-04 NOTE — PROGRESS NOTES
"[facility]      Name: Henrietta Villasenor  Clinic Number: [id number]    Therapy Diagnosis:   Encounter Diagnosis   Name Primary?    Lumbar spondylosis      Physician: Lizy Le, *    Visit Date: 3/4/2019    Precautions: Fall, HTN, CAD, CHF, DM II     Pattern of pain determined: 1 PEN    Medical Dx:  M48.061 (ICD-10-CM) - Spinal stenosis of lumbar region, unspecified whether neurogenic claudication present  M51.36 (ICD-10-CM) - DDD (degenerative disc disease), lumbar      Treatment Orders: PT Eval and Treat  Evaluation Date: 2019  Authorization Period Expiration: 2019  Plan of Care Expiration:   Reassessment Due: 2019  Visit # / Visits authorized: 3/20     Time In:  1230  Time Out: 1330  Total Billable Time: 60 minutes     Subjective   Henrietta reports only "a little low back pain" this date and reports she's not having much pain overall.     Patient reports their pain to be 3/10 on a 0-10 scale with 0 being no pain and 10 being the worst pain imaginable.  Pain Location:  Right knee joint and mid-line low back pain     Occupation:  Retired; Previously a cook   Leisure: Go to Religious and various social functions-- "I feel limited by my back pain."                     Patients goals:  "I would like to be able to walk for at least 10 blocks again."    Objective     Baseline Isometric Testing on Med X equipment: Testing administered by PT  Date of testin2019  ROM 42-0 deg   Max Peak Torque 164 ft/lbs    Min Peak Torque 24 ft/lbs    Flex/Ext Ratio 6.83   % below normative data -53%   Counter weight 275   Femur 5   Seat pad 0      CMS Impairment/Limitation/Restriction for FOTO LUMBAR Survey     Therapist reviewed FOTO scores for Henrietta Villasenor on 2019.   FOTO documents entered into [employer] - see Media section.     Limitation Score: 55%  Category: Mobility     CMS Impairment/Limitation/Restriction for FOTO Lumbar Spine Survey                      " Status                    Limitation           G-Code CMS Severity Modifier  Intake           45%                       55%                   Current Status CK - At least 40 percent but less than 60 percent  Predicted      51%                      49%                   Goal Status+ CK - At least 40 percent but less than 60 percent          Treatment        Henrietta received therapeutic exercises to develop/improve posture, lumbar/cervical ROM, strength and muscular endurance for 40 minutes including the following exercises:     -Swiss Ball Curls x20  -LTR x20  -Pelvic Tilts x10  -Bridging x20    [facility] 3/4/2019   Visit Number 3   VAS Pain Rating 3   Recumbent Bike Seat Pos. -   Time 10   Lumbar Weight 40   Repetitions 20   Rating of Perceived Exertion 3   Ice - Z Lie (in min.) 10     Peripheral muscle strengthening which included 1 set of 15-20 repetitions at a slow, controlled 10-13 second per rep pace focused on strengthening supporting musculature for improved body mechanics and functional mobility.  Pt and therapist focused on proper form during treatment to ensure optimal strengthening of each targeted muscle group.  Machines were utilized including torso rotation, leg extension, leg curl, chest press, upright row. Tricep extension, bicep curl, leg press, and hip abduction added visit 3    Home Exercise Program as follows:   -LTR, Pelvic Tilts, Red Theraband Supine Clams and Marching, Bridging, Seated Trunk Flexion/Extension  Handouts were given to the patient. Pt demo good understanding of the education provided. Henrietta demonstrated good return demonstration of activities.     Assessment     Henrietta tolerated session well this date with good progression of reps on the MedX. She requires increased verbal cues with progression of mobility and stability exercises, but should continue to benefit from advancing strength program.     -Patient is making good progress towards established goals. She performed  x20 reps of 40 ft/lbs at an RPE of 3/10; Increase 5-10% as appropriate 2  Pt will continue to benefit from skilled outpatient physical therapy to address the deficits stated in the impairment chart, provide pt/family education and to maximize pt's level of independence in the home and community environment.       Pt's spiritual, cultural and educational needs considered and pt agreeable to plan of care and goals as stated below:     Medical necessity is demonstrated by the following problem list.    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     Personal Factors:   age       moderate   Examination  Body Structures and Functions, activity limitations and participation restrictions that may impact the plan of care Body Regions:   neck  back  lower extremities     Body Systems:    ROM  strength  gross coordinated movement  balance  gait  transfers  motor control     Participation Restrictions:   Limited by pain and weakness; higher fall risk     Activity limitations:   Learning and applying knowledge  listening     General Tasks and Commands  no deficits     Communication  communicating with/receiving spoken language     Mobility  lifting and carrying objects  walking     Self care  no deficits     Domestic Life  cooking  doing house work (cleaning house, washing dishes, laundry)     Interactions/Relationships  no deficits     Life Areas  no deficits     Community and Social Life  recreation and leisure  Nondenominational and spirituality             moderate   Clinical Presentation evolving clinical presentation with changing clinical characteristics moderate   Decision Making/ Complexity Score: moderate         GOALS: Pt is in agreement with the following goals.     Short term goals:  6 weeks or 10 visits   1.  Pt  will demonstrate increased lumbar ROM by at least 3 degrees from the initial ROM value with improvements noted in functional ROM and ability to perform ADLs  2.  Pt will demonstrate increased maximum isometric torque value by 8% when compared to the initial value resulting in improved ability to perform bending, lifting, and carrying activities safely, confidently.     3.  Patient report a reduction in worst pain score by 1-2 points for improved tolerance during work and recreational activities  4.  Pt able to perform HEP correctly with minimal cueing or supervision for therapist        Long term goals: 13 weeks or 20 visits   1. Pt will demonstrate increased lumbar ROM by at least 6 degrees from initial ROM value, resulting in improved ability to perform functional fwd bending while standing and sitting.   2. Pt will demonstrate increased maximum isometric torque value by 15% when compared to the initial value resulting in improved ability to perform bending, lifting, and carrying activities safely, confidently.  3. Pt to demonstrate ability to independently control and reduce their pain through posture positioning and mechanical movements throughout a typical day.  4.  Patient will demonstrate improved overall function per FOTO Survey to CK = at least 40% but < 60% impaired, limited or restricted score or less.    Plan   Continue with established Plan of Care towards established PT goals.

## 2019-03-04 NOTE — PATIENT INSTRUCTIONS
POST VULVA BIOPSY COUNSELING:  Manage post biopsy pain with NSAIDs, Tylenol or Rx per MedCard.  Expect minimal spotting and black discharge from silver nitrite and/or Monsels solution.  Take warm sitz baths TID; dry with hair dryer on cool setting 12 inches away until  healed.  Report foul smelling drainage, heavy bleeding or redness at biopsy site, fever > 101.0 F, worsening pain.

## 2019-03-04 NOTE — PROCEDURES
Biopsy (Gynecological)  Date/Time: 3/4/2019 12:44 PM  Performed by: Maryana Romano MD  Authorized by: Maryana Romano MD     Consent Done?:  Yes (Verbal)   Patient was prepped and draped in the normal sterile fashion.  Local anesthesia used?: Yes    Anesthesia:  Local infiltration  Local anesthetic:  Lidocaine 1% without epinephrine  Anesthetic total (ml):  1.5    Biopsy Location:  Vulva  Vulva:     # of lesions:  1  Estimated blood loss (cc):  1   Patient tolerated the procedure well with no immediate complications.       VULVAR BIOPSY:    Henrietta Villasenor is a 79 y.o. female , presents for a vulva biopsy due to vulvar lesion.    PRE VULVA BIOPSY COUNSELING:  The patient was informed of the risk of bleeding, pain and infection. She was counseled on the alternatives to vulva biopsy and agrees to proceed.     EXAM:  There (is, are) (number) of  (flat raised annular oval irregular condylomatous)  lesion(s) of the (upper/mid/lower/anterior/posterior) (labia minora. labia majora. posterior fourchette. Groin. perineum).    PROCEDURE:  A time out was performed  The lesion and surrounding area were prepped with betadine swabs times three.  The base of the lesion was injected with 1.5 cc of 1% Xylocaine without epinephrine.          Punch Biopsy  There is a 1.5x1 cm raised dark slightly irregular lesion on the right labia majora. Punch biopsy with a 4 mm punch was performed, the base excised with scissors. Monsels for hemostasis.       ASSESSMENT:  1. Vulva Lesion / Biopsy.  The patient tolerated the procedure well.  POST VULVA BIOPSY COUNSELING:  Manage post biopsy pain with NSAIDs, Tylenol or Rx per MedCard.  Expect minimal spotting and black discharge from silver nitrite and/or Monsels solution.  Take warm sitz baths TID; dry with hair dryer on cool setting 12 inches away until  healed.  Report foul smelling drainage, heavy bleeding or redness at biopsy site, fever > 101.0 F, worsening  pain.    FOLLOW-UP: pending biopsy results.

## 2019-03-07 ENCOUNTER — CLINICAL SUPPORT (OUTPATIENT)
Dept: REHABILITATION | Facility: OTHER | Age: 79
End: 2019-03-07
Attending: PHYSICAL MEDICINE & REHABILITATION
Payer: MEDICARE

## 2019-03-07 DIAGNOSIS — M47.816 LUMBAR SPONDYLOSIS: ICD-10-CM

## 2019-03-07 PROCEDURE — 97110 THERAPEUTIC EXERCISES: CPT | Mod: HCNC

## 2019-03-07 NOTE — PROGRESS NOTES
"[facility]      Name: Henrietta Villasenor  Clinic Number: [id number]    Therapy Diagnosis:   Encounter Diagnosis   Name Primary?    Lumbar spondylosis      Physician: Lizy Le, *    Visit Date: 3/7/2019    Precautions: Fall, HTN, CAD, CHF, DM II     Pattern of pain determined: 1 PEN    Medical Dx:  M48.061 (ICD-10-CM) - Spinal stenosis of lumbar region, unspecified whether neurogenic claudication present  M51.36 (ICD-10-CM) - DDD (degenerative disc disease), lumbar      Treatment Orders: PT Eval and Treat  Evaluation Date: 2019  Authorization Period Expiration: 2019  Plan of Care Expiration:   Reassessment Due: 2019  Visit # / Visits authorized:      Time In:  11:00  Time Out: 12:00  Total Billable Time: 60 minutes     Face to Face discussion of patient was done between PT and PTA.     Subjective   Henrietta reports only "a little low back pain" this date and reports she's not having much pain overall.     Patient reports their pain to be 6/10 on a 0-10 scale with 0 being no pain and 10 being the worst pain imaginable.  Pain Location:  Right knee joint and mid-line low back pain     Occupation:  Retired; Previously a cook   Leisure: Go to [employer] and various social functions-- "I feel limited by my back pain."                     Patients goals:  "I would like to be able to walk for at least 10 blocks again."    Objective     Baseline Isometric Testing on Med X equipment: Testing administered by PT  Date of testin2019  ROM 42-0 deg   Max Peak Torque 164 ft/lbs    Min Peak Torque 24 ft/lbs    Flex/Ext Ratio 6.83   % below normative data -53%   Counter weight 275   Femur 5   Seat pad 0      CMS Impairment/Limitation/Restriction for FOTO LUMBAR Survey     Therapist reviewed FOTO scores for Henrietta Villasenor on 2019.   FOTO documents entered into [employer] - see Media section.     Limitation Score: 55%  Category: Mobility     CMS " Impairment/Limitation/Restriction for FOTO Lumbar Spine Survey                      Status                    Limitation           G-Code CMS Severity Modifier  Intake           45%                       55%                   Current Status CK - At least 40 percent but less than 60 percent  Predicted      51%                      49%                   Goal Status+ CK - At least 40 percent but less than 60 percent          Treatment        Henrietta received therapeutic exercises to develop/improve posture, lumbar/cervical ROM, strength and muscular endurance for 40 minutes including the following exercises:   [facility] 3/7/2019   Visit Number 4   VAS Pain Rating 6   Recumbent Bike Seat Pos. -   Time 10   Extension in Lying -   Extension in Standing -   Flexion in Lying 10   Flexion in Sitting -   Lumbar Extension Seat Pad -   Femur Restraint -   Top Dead Center -   Counterweight -   Lumbar Flexion -   Lumbar Extension -   Lumbar Peak Torque -   Lumbar Weight 44   Repetitions 20   Rating of Perceived Exertion 3   Ice - Z Lie (in min.) 10   -Swiss Ball Curls x20  -LTR x20  -Pelvic Tilts x10  -Bridging x20      Peripheral muscle strengthening which included 1 set of 15-20 repetitions at a slow, controlled 10-13 second per rep pace focused on strengthening supporting musculature for improved body mechanics and functional mobility.  Pt and therapist focused on proper form during treatment to ensure optimal strengthening of each targeted muscle group.  Machines were utilized including torso rotation, leg extension, leg curl, chest press, upright row. Tricep extension, bicep curl, leg press, and hip abduction added visit 3    Home Exercise Program as follows:   -LTR, Pelvic Tilts, Red Theraband Supine Clams and Marching, Bridging, Seated Trunk Flexion/Extension  Handouts were given to the patient. Pt demo good understanding of the education provided. Henrietta demonstrated good return demonstration of activities.      Assessment     Henrietta tolerated session well this date with good progression of reps on the MedX. She requires increased verbal cues with progression of mobility and stability exercises, but should continue to benefit from advancing strength program.     -Patient is making good progress towards established goals. She performed x 20 reps with a weight increase  RPE of 3/10;.  Pt will continue to benefit from skilled outpatient physical therapy to address the deficits stated in the impairment chart, provide pt/family education and to maximize pt's level of independence in the home and community environment.       Pt's spiritual, cultural and educational needs considered and pt agreeable to plan of care and goals as stated below:     Medical necessity is demonstrated by the following problem list.    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     Personal Factors:   age       moderate   Examination  Body Structures and Functions, activity limitations and participation restrictions that may impact the plan of care Body Regions:   neck  back  lower extremities     Body Systems:    ROM  strength  gross coordinated movement  balance  gait  transfers  motor control     Participation Restrictions:   Limited by pain and weakness; higher fall risk     Activity limitations:   Learning and applying knowledge  listening     General Tasks and Commands  no deficits     Communication  communicating with/receiving spoken language     Mobility  lifting and carrying objects  walking     Self care  no deficits     Domestic Life  cooking  doing house work (cleaning house, washing dishes, laundry)     Interactions/Relationships  no deficits     Life Areas  no deficits     Community and Social Life  recreation and  leisure  Sikh and spirituality             moderate   Clinical Presentation evolving clinical presentation with changing clinical characteristics moderate   Decision Making/ Complexity Score: moderate         GOALS: Pt is in agreement with the following goals.     Short term goals:  6 weeks or 10 visits   1.  Pt will demonstrate increased lumbar ROM by at least 3 degrees from the initial ROM value with improvements noted in functional ROM and ability to perform ADLs  2.  Pt will demonstrate increased maximum isometric torque value by 8% when compared to the initial value resulting in improved ability to perform bending, lifting, and carrying activities safely, confidently.     3.  Patient report a reduction in worst pain score by 1-2 points for improved tolerance during work and recreational activities  4.  Pt able to perform HEP correctly with minimal cueing or supervision for therapist        Long term goals: 13 weeks or 20 visits   1. Pt will demonstrate increased lumbar ROM by at least 6 degrees from initial ROM value, resulting in improved ability to perform functional fwd bending while standing and sitting.   2. Pt will demonstrate increased maximum isometric torque value by 15% when compared to the initial value resulting in improved ability to perform bending, lifting, and carrying activities safely, confidently.  3. Pt to demonstrate ability to independently control and reduce their pain through posture positioning and mechanical movements throughout a typical day.  4.  Patient will demonstrate improved overall function per FOTO Survey to CK = at least 40% but < 60% impaired, limited or restricted score or less.    Plan   Continue with established Plan of Care towards established PT goals.

## 2019-03-12 ENCOUNTER — OFFICE VISIT (OUTPATIENT)
Dept: PODIATRY | Facility: CLINIC | Age: 79
End: 2019-03-12
Payer: MEDICARE

## 2019-03-12 VITALS
HEIGHT: 68 IN | BODY MASS INDEX: 33.41 KG/M2 | SYSTOLIC BLOOD PRESSURE: 182 MMHG | WEIGHT: 220.44 LBS | DIASTOLIC BLOOD PRESSURE: 93 MMHG | RESPIRATION RATE: 18 BRPM | HEART RATE: 69 BPM

## 2019-03-12 DIAGNOSIS — B35.1 ONYCHOMYCOSIS DUE TO DERMATOPHYTE: ICD-10-CM

## 2019-03-12 DIAGNOSIS — E11.42 DIABETIC POLYNEUROPATHY ASSOCIATED WITH TYPE 2 DIABETES MELLITUS: Primary | ICD-10-CM

## 2019-03-12 DIAGNOSIS — L84 CORN OR CALLUS: ICD-10-CM

## 2019-03-12 PROCEDURE — 11057 PARNG/CUTG B9 HYPRKR LES >4: CPT | Mod: Q9,HCNC,S$GLB, | Performed by: PODIATRIST

## 2019-03-12 PROCEDURE — 99499 NO LOS: ICD-10-PCS | Mod: HCNC,S$GLB,, | Performed by: PODIATRIST

## 2019-03-12 PROCEDURE — 99999 PR PBB SHADOW E&M-EST. PATIENT-LVL III: CPT | Mod: PBBFAC,HCNC,, | Performed by: PODIATRIST

## 2019-03-12 PROCEDURE — 11721 DEBRIDE NAIL 6 OR MORE: CPT | Mod: 59,Q9,HCNC,S$GLB | Performed by: PODIATRIST

## 2019-03-12 PROCEDURE — 11057 PR TRIM BENIGN HYPERKERATOTIC SKIN LESION,>4: ICD-10-PCS | Mod: Q9,HCNC,S$GLB, | Performed by: PODIATRIST

## 2019-03-12 PROCEDURE — 11721 PR DEBRIDEMENT OF NAILS, 6 OR MORE: ICD-10-PCS | Mod: 59,Q9,HCNC,S$GLB | Performed by: PODIATRIST

## 2019-03-12 PROCEDURE — 99499 UNLISTED E&M SERVICE: CPT | Mod: HCNC,S$GLB,, | Performed by: PODIATRIST

## 2019-03-12 PROCEDURE — 99999 PR PBB SHADOW E&M-EST. PATIENT-LVL III: ICD-10-PCS | Mod: PBBFAC,HCNC,, | Performed by: PODIATRIST

## 2019-03-12 RX ORDER — OXYCODONE AND ACETAMINOPHEN 10; 325 MG/1; MG/1
TABLET ORAL
Qty: 150 TABLET | Refills: 0 | Status: SHIPPED | OUTPATIENT
Start: 2019-03-12 | End: 2019-04-12 | Stop reason: SDUPTHER

## 2019-03-12 NOTE — PROGRESS NOTES
Subjective:      Patient ID: Henrietta Villasenor is a 79 y.o. female.    Chief Complaint: PCP (Cristina yT MD 2/21/19); Diabetic Foot Exam; Foot Problem; Nail Care; and Numbness (Burning )    Henrietta is a 79 y.o. female who presents to the clinic for evaluation and treatment of high risk feet. Henrietta has a past medical history of Abnormality of lung (11/08/2011), Anxiety, Arthritis, CAD (coronary artery disease), Calculus of ureter (2/22/2011), Cataract, CHF (congestive heart failure), Chronic back pain (7/29/2012), Colon polyp (9/2010; 11/2010), Colon polyps (7/29/2012), Coronary artery disease involving native coronary artery of native heart with angina pectoris, Depression, Diabetes mellitus, Diabetes mellitus type II, Diverticulitis large intestine (7/27/2015), Diverticulosis (09/25/2010; 11/02/2010; 11/08/2011; 7/29/2012), Duodenal disorder (08/25/2011), Duodenal ulcer, unspecified as acute or chronic, without hemorrhage, perforation, or obstruction (8/24/2011), E. 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), 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), Obesity, MARLON (obstructive sleep apnea), Pain, Peripheral neuropathy, Pneumonia, Polyneuropathy, Postherpetic neuralgia, Recurrent nephrolithiasis, S/P knee replacement (10/2/2012), S/P TKR (total knee replacement) (12/26/2012), Sensorineural hearing loss of both ears, Thyroid disease (11/08/2011), Trouble in  sleeping, Type II or unspecified type diabetes mellitus with neurological manifestations, not stated as uncontrolled(250.60), Type II or unspecified type diabetes mellitus with peripheral circulatory disorders, not stated as uncontrolled(250.70), and Type II or unspecified type diabetes mellitus with renal manifestations, not stated as uncontrolled(250.40). The patient's chief complaint is long, thick toenails. This patient has documented high risk feet requiring routine maintenance secondary to diabetes mellitis and those secondary complications of diabetes, as mentioned..       PCP: Jeannine Arriaga MD    Date Last Seen by PCP:   Chief Complaint   Patient presents with    PCP     Cristina Ty MD 2/21/19    Diabetic Foot Exam    Foot Problem    Nail Care    Numbness     Burning          Current shoe gear: casual shoes    Hemoglobin A1C   Date Value Ref Range Status   02/13/2019 7.3 (H) 4.0 - 5.6 % Final     Comment:     ADA Screening Guidelines:  5.7-6.4%  Consistent with prediabetes  >or=6.5%  Consistent with diabetes  High levels of fetal hemoglobin interfere with the HbA1C  assay. Heterozygous hemoglobin variants (HbS, HgC, etc)do  not significantly interfere with this assay.   However, presence of multiple variants may affect accuracy.     10/03/2018 8.4 (H) 4.0 - 5.6 % Final     Comment:     ADA Screening Guidelines:  5.7-6.4%  Consistent with prediabetes  >or=6.5%  Consistent with diabetes  High levels of fetal hemoglobin interfere with the HbA1C  assay. Heterozygous hemoglobin variants (HbS, HgC, etc)do  not significantly interfere with this assay.   However, presence of multiple variants may affect accuracy.     04/19/2018 7.7 (H) 4.0 - 5.6 % Final     Comment:     According to ADA guidelines, hemoglobin A1c <7.0% represents  optimal control in non-pregnant diabetic patients. Different  metrics may apply to specific patient populations.   Standards of Medical Care in Diabetes-2016.  For the  "purpose of screening for the presence of diabetes:  <5.7%     Consistent with the absence of diabetes  5.7-6.4%  Consistent with increasing risk for diabetes   (prediabetes)  >or=6.5%  Consistent with diabetes  Currently, no consensus exists for use of hemoglobin A1c  for diagnosis of diabetes for children.  This Hemoglobin A1c assay has significant interference with fetal   hemoglobin   (HbF). The results are invalid for patients with abnormal amounts of   HbF,   including those with known Hereditary Persistence   of Fetal Hemoglobin. Heterozygous hemoglobin variants (HbAS, HbAC,   HbAD, HbAE, HbA2) do not significantly interfere with this assay;   however, presence of multiple variants in a sample may impact the %   interference.         Review of Systems   Constitution: Negative for chills, decreased appetite and fever.   Cardiovascular: Negative for leg swelling.   Skin: Positive for dry skin and nail changes. Negative for color change, flushing, itching, poor wound healing and rash.   Musculoskeletal: Positive for arthritis. Negative for back pain, falls, joint pain, joint swelling, muscle cramps and myalgias.   Gastrointestinal: Negative for nausea and vomiting.   Neurological: Positive for numbness and paresthesias. Negative for loss of balance.           Objective:       Vitals:    03/12/19 0955   BP: (!) 182/93   Pulse: 69   Resp: 18   Weight: 100 kg (220 lb 7.4 oz)   Height: 5' 8" (1.727 m)   PainSc:   8   PainLoc: Foot        Physical Exam   Constitutional: She is oriented to person, place, and time. She appears well-developed and well-nourished.   Cardiovascular:   Dorsalis pedis and posterior tibial pulses are diminished Bilaterally. Toes are cool to touch. Feet are warm proximally.There is decreased digital hair. Skin is atrophic, slightly hyperpigmented, and mildly edematous       Musculoskeletal: Normal range of motion. She exhibits no edema or tenderness.   Adequate joint range of motion without " pain, limitation, nor crepitation Bilateral feet and ankle joints. Muscle strength is 5/5 in all groups bilaterally.      semi reducible IPJ digital contracture 2nd toe b/l    Neurological: She is alert and oriented to person, place, and time.   [place] 5.07 monofilamant testing is diminished Mynor feet. Sharp/dull sensation diminished Bilaterally. Light touch absent Bilaterally.       Skin: Skin is warm, dry and intact. No abrasion, no bruising, no burn, no ecchymosis and no lesion noted. She is not diaphoretic. No erythema. No pallor.   Nails x 10  are elongated by 2-4mm's, thickened by 2-5 mm's, dystrophic, and are darkened in  coloration . Xerosis Bilaterally. No open lesions noted.    Hyperkeratotic tissue noted to distal toes 1-4 b/l        Psychiatric: She has a normal mood and affect. Her behavior is normal.   Nursing note and vitals reviewed.            Assessment:       Encounter Diagnoses   Name Primary?    Diabetic polyneuropathy associated with type 2 diabetes mellitus Yes    Onychomycosis due to dermatophyte     Corn or callus          Plan:       Henrietta was seen today for pcp, diabetic foot exam, foot problem, nail care and numbness.    Diagnoses and all orders for this visit:    Diabetic polyneuropathy associated with type 2 diabetes mellitus    Onychomycosis due to dermatophyte    Corn or callus      I counseled the patient on her conditions, their implications and medical management.    Shoe inspection. Diabetic Foot Education. Patient reminded of the importance of good nutrition and blood sugar control to help prevent podiatric complications of diabetes. Patient instructed on proper foot hygeine. We discussed wearing proper shoe gear, daily foot inspections, never walking without protective shoe gear, never putting sharp instruments to feet    - With patient's permission, nails were aggressively reduced and debrided x 10 to their soft tissue attachment mechanically and with electric  , removing all offending nail and debris. Patient relates relief following the procedure. She will continue to monitor the areas daily, inspect her feet, wear protective shoe gear when ambulatory, moisturizer to maintain skin integrity and follow in this office in approximately 2-3 months, sooner p.r.n.    - After cleansing the  area w/ alcohol prep pad the above mentioned hyperkeratosis was trimmed utilizing No 15 scapel, to a smooth base with out incident. Patient tolerated this  well and reported comfort to the area of distal toes 1-4 b/l   .

## 2019-03-12 NOTE — TELEPHONE ENCOUNTER
----- Message from Ngoc Coy sent at 3/12/2019  3:44 PM CDT -----  Contact: Patient [phone]  RX request - refill or new RX.  Is this a refill or new RX: refill   RX name and strength: oxyCODONE-acetaminophen (PERCOCET)  mg per tablet 150 tablet   Directions:   Is this a 30 day or 90 day RX:    Local pharmacy or mail order pharmacy:  local  Pharmacy name and phone #[facility] [phone] (Phone)  [phone] (Fax)      Comments:    Please call and advise  Thank you

## 2019-03-13 ENCOUNTER — CLINICAL SUPPORT (OUTPATIENT)
Dept: REHABILITATION | Facility: OTHER | Age: 79
End: 2019-03-13
Attending: PHYSICAL MEDICINE & REHABILITATION
Payer: MEDICARE

## 2019-03-13 DIAGNOSIS — M47.816 LUMBAR SPONDYLOSIS: ICD-10-CM

## 2019-03-13 PROCEDURE — 97110 THERAPEUTIC EXERCISES: CPT | Mod: HCNC

## 2019-03-13 NOTE — PROGRESS NOTES
"[facility]      Name: Henrietta Villasenor  Clinic Number: [id number]    Therapy Diagnosis:   Encounter Diagnosis   Name Primary?    Lumbar spondylosis      Physician: Lizy Le, *    Visit Date: 3/13/2019    Precautions: Fall, HTN, CAD, CHF, DM II     Pattern of pain determined: 1 PEN    Medical Dx:  M48.061 (ICD-10-CM) - Spinal stenosis of lumbar region, unspecified whether neurogenic claudication present  M51.36 (ICD-10-CM) - DDD (degenerative disc disease), lumbar      Treatment Orders: PT Eval and Treat  Evaluation Date: 2019  Authorization Period Expiration: 2019  Plan of Care Expiration:   Reassessment Due: 2019  Visit # / Visits authorized:      Time In:  11:00  Time Out: 12:00  Total Billable Time: 60 minutes     Face to Face discussion of patient was done between PT and PTA.     Subjective   Henrietta reports only "a little low back pain" this date and reports she's not having much pain overall.     Patient reports their pain to be 6/10 on a 0-10 scale with 0 being no pain and 10 being the worst pain imaginable.  Pain Location:  Right knee joint and mid-line low back pain     Occupation:  Retired; Previously a cook   Leisure: Go to [employer] and various social functions-- "I feel limited by my back pain."                     Patients goals:  "I would like to be able to walk for at least 10 blocks again."    Objective     Baseline Isometric Testing on Med X equipment: Testing administered by PT  Date of testin2019  ROM 42-0 deg   Max Peak Torque 164 ft/lbs    Min Peak Torque 24 ft/lbs    Flex/Ext Ratio 6.83   % below normative data -53%   Counter weight 275   Femur 5   Seat pad 0      CMS Impairment/Limitation/Restriction for FOTO LUMBAR Survey     Therapist reviewed FOTO scores for Henrietta Villasenor on 2019.   FOTO documents entered into [employer] - see Media section.     Limitation Score: 55%  Category: Mobility     CMS " Impairment/Limitation/Restriction for FOTO Lumbar Spine Survey                      Status                    Limitation           G-Code CMS Severity Modifier  Intake           45%                       55%                   Current Status CK - At least 40 percent but less than 60 percent  Predicted      51%                      49%                   Goal Status+ CK - At least 40 percent but less than 60 percent          Treatment        Henrietta received therapeutic exercises to develop/improve posture, lumbar/cervical ROM, strength and muscular endurance for 40 minutes including the following exercises:   -Swiss Ball Curls x20  -LTR x20  -Pelvic Tilts x10 --> TrA +Marching x20  +Single Leg Kicks x20  -Bridging x10 with 5 second holds     [facility] 3/13/2019   Visit Number 5   VAS Pain Rating 6   Recumbent Bike Seat Pos. -   Time 10   Lumbar Weight 51   Repetitions 20   Rating of Perceived Exertion 3   Ice - Z Lie (in min.) 10       Peripheral muscle strengthening which included 1 set of 15-20 repetitions at a slow, controlled 10-13 second per rep pace focused on strengthening supporting musculature for improved body mechanics and functional mobility.  Pt and therapist focused on proper form during treatment to ensure optimal strengthening of each targeted muscle group.  Machines were utilized including torso rotation, leg extension, leg curl, chest press, upright row. Tricep extension, bicep curl, leg press, and hip abduction added visit 3    Home Exercise Program as follows:   -LTR, Pelvic Tilts, Red Theraband Supine Clams and Marching, Bridging, Seated Trunk Flexion/Extension  Handouts were given to the patient. Pt demo good understanding of the education provided. Henrietta demonstrated good return demonstration of activities.     Assessment     Henrietta tolerated session well again with good participation in strengthening exercises. She should do well to increase weight on the MedX more aggressively based  on isometric testing as appropriate with RPE values.     -Patient is making good progress towards established goals. She performed x20 reps of 51 ft/lbs at an RPE of 3/10; Increase 10-15% as appropriate   Pt will continue to benefit from skilled outpatient physical therapy to address the deficits stated in the impairment chart, provide pt/family education and to maximize pt's level of independence in the home and community environment.       Pt's spiritual, cultural and educational needs considered and pt agreeable to plan of care and goals as stated below:     Medical necessity is demonstrated by the following problem list.    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     Personal Factors:   age       moderate   Examination  Body Structures and Functions, activity limitations and participation restrictions that may impact the plan of care Body Regions:   neck  back  lower extremities     Body Systems:    ROM  strength  gross coordinated movement  balance  gait  transfers  motor control     Participation Restrictions:   Limited by pain and weakness; higher fall risk     Activity limitations:   Learning and applying knowledge  listening     General Tasks and Commands  no deficits     Communication  communicating with/receiving spoken language     Mobility  lifting and carrying objects  walking     Self care  no deficits     Domestic Life  cooking  doing house work (cleaning house, washing dishes, laundry)     Interactions/Relationships  no deficits     Life Areas  no deficits     Community and Social Life  recreation and leisure  Mosque and spirituality             moderate   Clinical Presentation evolving clinical presentation with changing clinical characteristics moderate   Decision Making/ Complexity  Score: moderate         GOALS: Pt is in agreement with the following goals.     Short term goals:  6 weeks or 10 visits   1.  Pt will demonstrate increased lumbar ROM by at least 3 degrees from the initial ROM value with improvements noted in functional ROM and ability to perform ADLs  2.  Pt will demonstrate increased maximum isometric torque value by 8% when compared to the initial value resulting in improved ability to perform bending, lifting, and carrying activities safely, confidently.     3.  Patient report a reduction in worst pain score by 1-2 points for improved tolerance during work and recreational activities  4.  Pt able to perform HEP correctly with minimal cueing or supervision for therapist        Long term goals: 13 weeks or 20 visits   1. Pt will demonstrate increased lumbar ROM by at least 6 degrees from initial ROM value, resulting in improved ability to perform functional fwd bending while standing and sitting.   2. Pt will demonstrate increased maximum isometric torque value by 15% when compared to the initial value resulting in improved ability to perform bending, lifting, and carrying activities safely, confidently.  3. Pt to demonstrate ability to independently control and reduce their pain through posture positioning and mechanical movements throughout a typical day.  4.  Patient will demonstrate improved overall function per FOTO Survey to CK = at least 40% but < 60% impaired, limited or restricted score or less.    Plan   Continue with established Plan of Care towards established PT goals.

## 2019-03-14 ENCOUNTER — TELEPHONE (OUTPATIENT)
Dept: OBSTETRICS AND GYNECOLOGY | Facility: CLINIC | Age: 79
End: 2019-03-14

## 2019-03-18 ENCOUNTER — CLINICAL SUPPORT (OUTPATIENT)
Dept: REHABILITATION | Facility: OTHER | Age: 79
End: 2019-03-18
Attending: PHYSICAL MEDICINE & REHABILITATION
Payer: MEDICARE

## 2019-03-18 DIAGNOSIS — M47.816 LUMBAR SPONDYLOSIS: ICD-10-CM

## 2019-03-18 PROCEDURE — 97110 THERAPEUTIC EXERCISES: CPT | Mod: HCNC

## 2019-03-18 NOTE — PROGRESS NOTES
"[facility]      Name: Henrietta Villasenor  Clinic Number: [id number]    Therapy Diagnosis:   Encounter Diagnosis   Name Primary?    Lumbar spondylosis      Physician: Lizy Le, *    Visit Date: 3/18/2019    Precautions: Fall, HTN, CAD, CHF, DM II     Pattern of pain determined: 1 PEN    Medical Dx:  M48.061 (ICD-10-CM) - Spinal stenosis of lumbar region, unspecified whether neurogenic claudication present  M51.36 (ICD-10-CM) - DDD (degenerative disc disease), lumbar      Treatment Orders: PT Eval and Treat  Evaluation Date: 2019  Authorization Period Expiration: 2019  Plan of Care Expiration:   Reassessment Due: 2019  Visit # / Visits authorized:      Time In:  2:30  Time Out: 3:30  Total Billable Time: 60 minutes     Face to Face discussion of patient was done between PT and PTA.     Subjective   Henrietta reports she was "hurting" today.     Patient reports their pain to be 6/10 on a 0-10 scale with 0 being no pain and 10 being the worst pain imaginable.  Pain Location:  Right knee joint and mid-line low back pain     Occupation:  Retired; Previously a cook   Leisure: Go to [employer] and various social functions-- "I feel limited by my back pain."                     Patients goals:  "I would like to be able to walk for at least 10 blocks again."    Objective     Baseline Isometric Testing on Med X equipment: Testing administered by PT  Date of testin2019  ROM 42-0 deg   Max Peak Torque 164 ft/lbs    Min Peak Torque 24 ft/lbs    Flex/Ext Ratio 6.83   % below normative data -53%   Counter weight 275   Femur 5   Seat pad 0      CMS Impairment/Limitation/Restriction for FOTO LUMBAR Survey     Therapist reviewed FOTO scores for Henrietta Villasenor on 2019.   FOTO documents entered into [employer] - see Media section.     Limitation Score: 55%  Category: Mobility     CMS Impairment/Limitation/Restriction for FOTO Lumbar Spine Survey                 "      Status                    Limitation           G-Code CMS Severity Modifier  Intake           45%                       55%                   Current Status CK - At least 40 percent but less than 60 percent  Predicted      51%                      49%                   Goal Status+ CK - At least 40 percent but less than 60 percent          Treatment        Henrietta received therapeutic exercises to develop/improve posture, lumbar/cervical ROM, strength and muscular endurance for 40 minutes including the following exercises:   [facility] 3/18/2019   Visit Number 6   VAS Pain Rating 6   Recumbent Bike Seat Pos. -   Time 10   Extension in Lying -   Extension in Standing -   Flexion in Lying 10   Flexion in Sitting -   Lumbar Extension Seat Pad -   Femur Restraint -   Top Dead Center -   Counterweight -   Lumbar Flexion -   Lumbar Extension -   Lumbar Peak Torque -   Lumbar Weight 55   Repetitions 18   Rating of Perceived Exertion 3   Ice - Z Lie (in min.) 10   -Swiss Ball Curls x20  -LTR x20  -Pelvic Tilts x10 -->   TrA +Marching x20  +Single Leg Kicks x20  -Bridging x10 with 5 second holds         Peripheral muscle strengthening which included 1 set of 15-20 repetitions at a slow, controlled 10-13 second per rep pace focused on strengthening supporting musculature for improved body mechanics and functional mobility.  Pt and therapist focused on proper form during treatment to ensure optimal strengthening of each targeted muscle group.  Machines were utilized including torso rotation, leg extension, leg curl, chest press, upright row. Tricep extension, bicep curl, leg press, and hip abduction added visit 3    Home Exercise Program as follows:   -LTR, Pelvic Tilts, Red Theraband Supine Clams and Marching, Bridging, Seated Trunk Flexion/Extension  Handouts were given to the patient. Pt demo good understanding of the education provided. Henrietta demonstrated good return demonstration of activities.      Assessment     Henrietta tolerated session well again with good participation and a decrease in her LBP.SHe demo her HEP well with min vc. Patient is making good progress towards established goals. She performed x18 reps with s weight increase and no LBP, RPE 3/10; Increase 10% next session.   Pt will continue to benefit from skilled outpatient physical therapy to address the deficits stated in the impairment chart, provide pt/family education and to maximize pt's level of independence in the home and community environment.       Pt's spiritual, cultural and educational needs considered and pt agreeable to plan of care and goals as stated below:     Medical necessity is demonstrated by the following problem list.    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     Personal Factors:   age       moderate   Examination  Body Structures and Functions, activity limitations and participation restrictions that may impact the plan of care Body Regions:   neck  back  lower extremities     Body Systems:    ROM  strength  gross coordinated movement  balance  gait  transfers  motor control     Participation Restrictions:   Limited by pain and weakness; higher fall risk     Activity limitations:   Learning and applying knowledge  listening     General Tasks and Commands  no deficits     Communication  communicating with/receiving spoken language     Mobility  lifting and carrying objects  walking     Self care  no deficits     Domestic Life  cooking  doing house work (cleaning house, washing dishes, laundry)     Interactions/Relationships  no deficits     Life Areas  no deficits     Community and Social Life  recreation and leisure  Oriental orthodox and spirituality             moderate   Clinical Presentation evolving clinical  presentation with changing clinical characteristics moderate   Decision Making/ Complexity Score: moderate         GOALS: Pt is in agreement with the following goals.     Short term goals:  6 weeks or 10 visits   1.  Pt will demonstrate increased lumbar ROM by at least 3 degrees from the initial ROM value with improvements noted in functional ROM and ability to perform ADLs  2.  Pt will demonstrate increased maximum isometric torque value by 8% when compared to the initial value resulting in improved ability to perform bending, lifting, and carrying activities safely, confidently.     3.  Patient report a reduction in worst pain score by 1-2 points for improved tolerance during work and recreational activities  4.  Pt able to perform HEP correctly with minimal cueing or supervision for therapist        Long term goals: 13 weeks or 20 visits   1. Pt will demonstrate increased lumbar ROM by at least 6 degrees from initial ROM value, resulting in improved ability to perform functional fwd bending while standing and sitting.   2. Pt will demonstrate increased maximum isometric torque value by 15% when compared to the initial value resulting in improved ability to perform bending, lifting, and carrying activities safely, confidently.  3. Pt to demonstrate ability to independently control and reduce their pain through posture positioning and mechanical movements throughout a typical day.  4.  Patient will demonstrate improved overall function per FOTO Survey to CK = at least 40% but < 60% impaired, limited or restricted score or less.    Plan   Continue with established Plan of Care towards established PT goals.

## 2019-03-27 ENCOUNTER — CLINICAL SUPPORT (OUTPATIENT)
Dept: REHABILITATION | Facility: OTHER | Age: 79
End: 2019-03-27
Attending: PHYSICAL MEDICINE & REHABILITATION
Payer: MEDICARE

## 2019-03-27 DIAGNOSIS — M47.816 LUMBAR SPONDYLOSIS: ICD-10-CM

## 2019-03-27 PROCEDURE — 97110 THERAPEUTIC EXERCISES: CPT | Mod: HCNC

## 2019-03-27 NOTE — PROGRESS NOTES
"[facility]      Name: Henrietta Villasenor  Clinic Number: [id number]    Therapy Diagnosis:   Encounter Diagnosis   Name Primary?    Lumbar spondylosis      Physician: Lizy Le, *    Visit Date: 3/27/2019    Precautions: Fall, HTN, CAD, CHF, DM II     Pattern of pain determined: 1 PEN    Medical Dx:  M48.061 (ICD-10-CM) - Spinal stenosis of lumbar region, unspecified whether neurogenic claudication present  M51.36 (ICD-10-CM) - DDD (degenerative disc disease), lumbar    Treatment Orders: PT Eval and Treat  Evaluation Date: 2019  Authorization Period Expiration: 2019  Plan of Care Expiration:   Reassessment Due: 2019  Visit # / Visits authorized:  **Try Increasing ROM by 3-6 deg**     Time In:  1108  Time Out: 1200  Total Billable Time: 52 minutes     Face to Face discussion of patient was done between PT and PTA.     Subjective   Henrietta reports that she "feels bad everywhere." She states that she had cancelled her last appointment because of a "cold" and reports she was in bed for a few days which has not been good on her back.    Patient reports their pain to be 7/10 on a 0-10 scale with 0 being no pain and 10 being the worst pain imaginable.  Pain Location:  Right knee joint and mid-line low back pain     Occupation:  Retired; Previously a cook   Leisure: Go to [employer] and various social functions-- "I feel limited by my back pain."                     Patients goals:  "I would like to be able to walk for at least 10 blocks again."    Objective     Baseline Isometric Testing on Med X equipment: Testing administered by PT  Date of testin2019  ROM 30-0 deg   Max Peak Torque 164 ft/lbs    Min Peak Torque 24 ft/lbs    Flex/Ext Ratio 6.83   % below normative data -53%   Counter weight 275   Femur 5   Seat pad 0      CMS Impairment/Limitation/Restriction for FOTO LUMBAR Survey     Therapist reviewed FOTO scores for Henrietta Villasenor on " 1/31/2019.   FOTO documents entered into [employer] - see Media section.     Limitation Score: 55%  Category: Mobility     CMS Impairment/Limitation/Restriction for FOTO Lumbar Spine Survey                      Status                    Limitation           G-Code CMS Severity Modifier  Intake           45%                       55%                   Current Status CK - At least 40 percent but less than 60 percent  Predicted      51%                      49%                   Goal Status+ CK - At least 40 percent but less than 60 percent          Treatment        Henrietta received therapeutic exercises to develop/improve posture, lumbar/cervical ROM, strength and muscular endurance for 40 minutes including the following exercises:     -Swiss Ball Curls x20, Active Knee Extension Glides x20, Ankle Pumps x20  -LTR x20  -Pelvic Tilts x10 --> TrA +Marching x20  +Single Leg Kicks x20 [Manual Cues for Technique]   -Bridging+Adduction (2# ball) x15 with 3 second holds     [facility] 3/27/2019   Visit Number 7   VAS Pain Rating 7   Recumbent Bike Seat Pos. -   Time 10   Lumbar Flexion 36   Lumbar Extension 0   Lumbar Peak Torque -   Lumbar Weight 60   Repetitions 20   Rating of Perceived Exertion 5   Ice - Z Lie (in min.) 10     Peripheral muscle strengthening which included 1 set of 15-20 repetitions at a slow, controlled 10-13 second per rep pace focused on strengthening supporting musculature for improved body mechanics and functional mobility.  Pt and therapist focused on proper form during treatment to ensure optimal strengthening of each targeted muscle group.  Machines were utilized including torso rotation, leg extension, leg curl, chest press, upright row. Tricep extension, bicep curl, leg press, and hip abduction added visit 3    Home Exercise Program as follows:   -LTR, Pelvic Tilts, Red Theraband Supine Clams and Marching, Bridging, Seated Trunk Flexion/Extension  Handouts were given to the patient. Pt demo  "good understanding of the education provided. Henrietta demonstrated good return demonstration of activities.     Assessment     Henrietta tolerated session well despite initial complaints of residual pain from recent "cold symptoms." She demonstrated good core activation with warm-up exercises given increased manual cues this date for TrA contraction. PT advanced both weight and ROM this date in consideration of evaluation testing numbers. She does well when given verbal cues on the MedX to focus on end range control; plan to continue advancing as appropriate per protocol to work toward goals.     -She performed x20 reps of 60 ft/lbs at an RPE of 5/10; Increase weight by 5-10% as appropriate and consider increasing flexion range    Pt will continue to benefit from skilled outpatient physical therapy to address the deficits stated in the impairment chart, provide pt/family education and to maximize pt's level of independence in the home and community environment.       Pt's spiritual, cultural and educational needs considered and pt agreeable to plan of care and goals as stated below:     Medical necessity is demonstrated by the following problem list.    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     Personal Factors:   age       moderate   Examination  Body Structures and Functions, activity limitations and participation restrictions that may impact the plan of care Body Regions:   neck  back  lower extremities     Body Systems:    ROM  strength  gross coordinated movement  balance  gait  transfers  motor control     Participation Restrictions:   Limited by pain and weakness; higher fall risk     Activity limitations:   Learning and applying knowledge  listening     General Tasks and Commands  no " deficits     Communication  communicating with/receiving spoken language     Mobility  lifting and carrying objects  walking     Self care  no deficits     Domestic Life  cooking  doing house work (cleaning house, washing dishes, laundry)     Interactions/Relationships  no deficits     Life Areas  no deficits     Community and Social Life  recreation and leisure  Muslim and spirituality             moderate   Clinical Presentation evolving clinical presentation with changing clinical characteristics moderate   Decision Making/ Complexity Score: moderate         GOALS: Pt is in agreement with the following goals.     Short term goals:  6 weeks or 10 visits   1.  Pt will demonstrate increased lumbar ROM by at least 3 degrees from the initial ROM value with improvements noted in functional ROM and ability to perform ADLs Met, 03/27/2019 increased to 36-0 deg  2.  Pt will demonstrate increased maximum isometric torque value by 8% when compared to the initial value resulting in improved ability to perform bending, lifting, and carrying activities safely, confidently. Progressing  3.  Patient report a reduction in worst pain score by 1-2 points for improved tolerance during work and recreational activities Progressing  4.  Pt able to perform HEP correctly with minimal cueing or supervision for therapist Progressing         Long term goals: 13 weeks or 20 visits   1. Pt will demonstrate increased lumbar ROM by at least 6 degrees from initial ROM value, resulting in improved ability to perform functional fwd bending while standing and sitting.   2. Pt will demonstrate increased maximum isometric torque value by 15% when compared to the initial value resulting in improved ability to perform bending, lifting, and carrying activities safely, confidently.  3. Pt to demonstrate ability to independently control and reduce their pain through posture positioning and mechanical movements throughout a typical day.  4.  Patient will  demonstrate improved overall function per FOTO Survey to CK = at least 40% but < 60% impaired, limited or restricted score or less.    Plan   Continue with established Plan of Care towards established PT goals.

## 2019-04-01 NOTE — PROGRESS NOTES
Subjective:      Patient ID: Henrietta Villasenor is a 79 y.o. female.    Chief Complaint: Low-back Pain    Ms Villasenor is a 78 yo female here for follow up of her CTS and her low back pain.  She was last seen by me on 12/14/2018 and she was not interested in lumbar injections.  She wanted to return to healthy back.    we repeated CT injections on 2/5/2018.  Previously we did bilateral Ct injections on 9/29/2016 which also helped. She has been to 7 PT visits.  Today, she is not feeling good.  She has a cold and her blood pressure is really high.  She feels weak.  She is having a headache and her ear is stopped up.  She also feels like hard to swallow.  She does not feel like she can go to PT.  She does feel better with PT.  She has a hard time getting up in the morning.  She has been sick a lot this winter.  She has been having a hard time getting up in the morning.  The pain is across the mid back.  She feels like lying down also hurts.  She is taking the tizanidine a half a pill.  She feels like a full pill makes her sleepy.  She feels like she is tired.  She is having midback and low back pain.  She feels like housework makes the pain worst.  There is not much back pain.  She is still having hand pain and foot pain.  She has only been to 7 visits.  She has a hard time getting up in morning, but then also hard time sleeping, and pain with housework.  Pain is 8/10     MRI lumbar 6/2018  Alignment: Normal.    Vertebrae: Normal marrow signal. No fracture.    Discs: Normal height and signal.    Cord: Normal.  Conus terminates at superior border of L1    Degenerative findings:    T12-L1: Mild broad-based disc bulge without compressive sequelae.    L1-L2: Unremarkable.    L2-L3: Mild broad-based disc bulge with facet arthropathy.  No compressive sequelae.    L3-L4: Broad-based disc bulge and facet arthropathy.  There is mild bilateral neural foraminal narrowing.    L4-L5: Trace anterolisthesis of L4 on L5 with broad-based disc  bulge and facet arthropathy.  This results in moderate bilateral neural foraminal narrowing.  Moderate canal stenosis.  There is also significant narrowing of the left greater than right lateral recesses.    L5-S1: Broad-based disc bulge without significant compressive sequelae.    Paraspinal muscles & soft tissues: Paraspinal atrophy.  No concerning findings in the visualized abdomen.  Add    Impression      Moderate degenerative change at L4-5.  Milder degenerative changes elsewhere in the lumbar spine.    X-ray lumbar 6/2018  The lumbar vertebra are intact.  No compression fracture is identified.  Degenerative changes are again seen with small osteophytes at multiple levels.  L4-5 disc space is mildly narrowed.  Degenerative changes are also noted at lower facet joints.  There is associated grade 1 spondylolisthesis at L4-5 which is stable between flexion and extension at about 6 mm.  Alignment is otherwise satisfactory.    Visualized abdomen shows aortic atherosclerosis and surgical clips in the right upper quadrant.    X-ray knees bilateral  Postsurgical changes noted in the left knee consistent with total knee arthroplasty.  Hardware appears intact.  No evidence of fracture or loosening seen.  No fracture or dislocation is identified.  Advanced degenerative changes are noted in the right knee, most pronounced in the medial tibiofemoral compartment, with joint space narrowing, sclerosis, and osteophyte formation present.  Small joint effusion noted.  Atherosclerotic calcification noted in both knees.    Impression      Advanced degenerative changes in the right knee, most pronounced in the medial tibiofemoral compartment.        Past Medical History:  11/08/2011: Abnormality of lung      Comment: Stable bandlike opacities at the lung bases,                most likely representing    No date: Anxiety  No date: Arthritis  No date: CAD (coronary artery disease)  2/22/2011: Calculus of ureter  No date: Cataract  No  date: CHF (congestive heart failure)  7/29/2012: Chronic back pain  9/2010; 11/2010: Colon polyp  7/29/2012: Colon polyps  No date: Coronary artery disease involving native coron*  No date: Depression  No date: Diabetes mellitus  No date: Diabetes mellitus type II  7/27/2015: Diverticulitis large intestine  09/25/2010; 11/02/2010; 11/08/2011; 7/29/2012: Diverticulosis  08/25/2011: Duodenal disorder      Comment: Duodenal erosion noted on EGD.  8/24/2011: Duodenal ulcer, unspecified as acute or chroni*  12/2010: E. coli sepsis      Comment: Due to left ureteral stone with left                nephrostomy tube - hospitalized in [place]  01/24/2012: Esophageal dysmotility      Comment: Noted on upper GI-barium swallow.  1969: Facial weakness      Comment: Left facial weakness s/p left mastoidectomy in               ~ 1969.  11/08/2011: Fatty liver      Comment: Reported on CT-abdomen and in 06/2012 Gastro                clinic visit note.  09/29/2010: Gastric polyp  7/29/2012: GERD (gastroesophageal reflux disease)  11/08/2011: Hepatomegaly      Comment: Reported on CT-abdomen  2/28/2011: Herpes zoster with other nervous system compli*  06/26/2006; 09/29/2010; 08/25/2011: Hiatal hernia      Comment: Noted on barium swallow 2006; noted on  EGD                2011.  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  No date: Trouble in sleeping  No date: Type II or unspecified type diabetes mellitus *  No date: Type II or unspecified type diabetes mellitus *  No date: Type II or unspecified type diabetes mellitus *    Past Surgical History:  No date: BELPHAROPTOSIS REPAIR      Comment: s/p LAMBERTO levator repair - Dr. Dejesus  No date: CARDIAC CATHETERIZATION  3/13/2012: CARPAL TUNNEL RELEASE  No date: CHOLECYSTECTOMY  11/16/2016: COLONOSCOPY N/A      Comment: Procedure: COLONOSCOPY;  Surgeon: Chris Storey MD;  Location: [facility] ENDO (4TH FLR);                 Service: Endoscopy;  Laterality: N/A;  6/14/2017: COLONOSCOPY N/A      Comment: Procedure: COLONOSCOPY;  Surgeon: Chris Storey MD;  Location: [facility] ENDO (4TH FLR);                 Service: Endoscopy;  Laterality: N/A;                 colonoscopy in 3 months with better bowel prep.               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  No date: NEPHROSTOMY  No date: OOPHORECTOMY  9/13/2012: TOTAL KNEE ARTHROPLASTY      Comment: Left    Review of patient's family history indicates:    Sleep apnea                    Sister                    Cancer                         Sister                      Comment: breast    Diabetes                       Sister                    Breast cancer                  Sister                    Cancer                         Mother                      Comment: brain tumor    Hypertension                   Mother                    Heart disease                  Father                    Heart attack                   Father                    Dementia                       Brother                   Diabetes                        Brother                   Lupus                          Brother                   No Known Problems              Daughter                  No Known Problems              Son                       Diabetes                       Sister                    Lupus                          Sister                    Diabetes                       Sister                    Cancer                         Sister                    Kidney disease                 Sister                    Heart disease                  Sister                    Liver cancer                   Brother                   Cirrhosis                      Brother                   No Known Problems              Daughter                  No Known Problems              Son                       No Known Problems              Son                       Diabetes                       Other                     Ovarian cancer                 Other                       Comment: Niece     Breast cancer                  Maternal Aunt             Glaucoma                       Neg Hx                    Blindness                      Neg Hx                    Celiac disease                 Neg Hx                    Colon cancer                   Neg Hx                    Colon polyps                   Neg Hx                    Esophageal cancer              Neg Hx                    Inflammatory bowel disease     Neg Hx                    Liver disease                  Neg Hx                    Rectal cancer                  Neg Hx                    Stomach cancer                 Neg Hx                    Ulcerative colitis             Neg Hx                    Melanoma                       Neg Hx                    Multiple sclerosis             Neg Hx                    Psoriasis                      Neg Hx                      Social History    Marital status: Single              Spouse name:                       Years of education:                 Number  "of children: 5             Social History Main Topics    Smoking status: Former Smoker                                                                Packs/day: 1.00      Years: 15.00          Types: Cigarettes       Quit date: 7/24/2000    Smokeless tobacco: Never Used                        Alcohol use: No              Drug use: No              Sexual activity: No                   Social History Narrative    Single with 5 children. Lives alone. Retired cook.        Current Outpatient Prescriptions:  ACCU-CHEK FASTCLIX Misc, , Disp: , Rfl:   ACCU-CHEK SMARTVIEW TEST STRIP Strp, TEST THREE TIMES DAILY, Disp: 300 strip, Rfl: 3  albuterol (PROVENTIL) 2.5 mg /3 mL (0.083 %) nebulizer solution, Take 3 mLs (2.5 mg total) by nebulization every 6 (six) hours as needed for Wheezing. Rescue, Disp: 90 mL, Rfl: 6  ALCOHOL ANTISEPTIC PADS (ALCOHOL PREP PADS TOP), , Disp: , Rfl:   aspirin (ECOTRIN) 81 MG EC tablet, Take 1 tablet by mouth Daily., Disp: , Rfl:   atorvastatin (LIPITOR) 40 MG tablet, Take 1 tablet (40 mg total) by mouth once daily., Disp: 90 tablet, Rfl: 3  BD ULTRA-FINE OLU PEN NEEDLES 32 x 5/32 " Ndle, Uses 4 times daily, on multiple daily insulin injections, Disp: 130 each, Rfl: 12  carvedilol (COREG) 25 MG tablet, TAKE 1 TABLET TWICE DAILY, Disp: 180 tablet, Rfl: 3  cholecalciferol, vitamin D3, (VITAMIN D) 2,000 unit Cap, Take by mouth. 1 Capsule Oral Every morning, Disp: , Rfl:   diaper,brief,adult,disposable Misc, , Disp: , Rfl:   dicyclomine (BENTYL) 10 MG capsule, Take 1 capsule (10 mg total) by mouth 3 (three) times daily as needed (abdominal pain)., Disp: 60 capsule, Rfl: 3  duloxetine (CYMBALTA) 60 MG capsule, Take 1 capsule (60 mg total) by mouth once daily., Disp: 30 capsule, Rfl: 11  fluticasone (FLONASE) 50 mcg/actuation nasal spray, SHAKE LQ AND U 2 SPRAYS IEN QD, Disp: 1 Bottle, Rfl: 6  furosemide (LASIX) 40 MG tablet, TAKE 1 TABLET twice daily, Disp: 270 tablet, Rfl: 3  gabapentin (NEURONTIN) 100 " "MG capsule, TAKE 1 CAPSULE THREE TIMES DAILY  AND TAKE 3 CAPSULES AT BEDTIME, Disp: 540 capsule, Rfl: 2  insulin aspart (NOVOLOG) 100 unit/mL InPn pen, 23 units with meals plus correction scale. Max daily dose=120 units, Disp: 8 Box, Rfl: 3  insulin glargine (LANTUS SOLOSTAR) 100 unit/mL (3 mL) InPn pen, Inject 60 Units into the skin every evening., Disp: 4 Box, Rfl: 3  Lactobacillus acidophilus (PROBIOTIC) 10 billion cell Cap, Take 1 capsule by mouth once daily., Disp: , Rfl:   loratadine (CLARITIN) 10 mg tablet, Take 1 tablet (10 mg total) by mouth once daily., Disp: 90 tablet, Rfl: 4  MUSCLE RUB, WITH CAMPHOR, 4-30-10 % Crea, , Disp: , Rfl:   nitroGLYCERIN (NITROSTAT) 0.4 MG SL tablet, Place 1 tablet (0.4 mg total) under the tongue every 5 (five) minutes as needed., Disp: 25 tablet, Rfl: 2  nystatin (MYCOSTATIN) cream, Apply topically 2 (two) times daily., Disp: 120 g, Rfl: 3  ofloxacin (OCUFLOX) 0.3 % ophthalmic solution, Place 1 drop into the left eye 3 (three) times daily., Disp: 5 mL, Rfl: 1  ondansetron (ZOFRAN) 4 MG tablet, Take 1 tablet (4 mg total) by mouth every 8 (eight) hours as needed for Nausea., Disp: 30 tablet, Rfl: 3  (START ON 3/19/2018) oxyCODONE-acetaminophen (PERCOCET)  mg per tablet, Take 1 tablet by mouth 5 (five) times daily., Disp: 150 tablet, Rfl: 0  oxyCODONE-acetaminophen (PERCOCET)  mg per tablet, Take 1 tablet by mouth 5 (five) times daily., Disp: 150 tablet, Rfl: 0  pantoprazole (PROTONIX) 40 MG tablet, TAKE 1 TABLET(40 MG) BY MOUTH twice daily, Disp: 180 tablet, Rfl: 4  pen needle, diabetic (SURE-FINE PEN NEEDLES) 29 gauge x 1/2" Ndle, Use 4 times daily, Disp: 400 each, Rfl: 4  prednisoLONE acetate (PRED FORTE) 1 % DrpS, Place 1 drop into the left eye 3 (three) times daily., Disp: 5 mL, Rfl: 1  promethazine (PHENERGAN) 6.25 mg/5 mL syrup, Take 5 mLs (6.25 mg total) by mouth every 6 (six) hours as needed (cough)., Disp: 118 mL, Rfl: 0  tizanidine (ZANAFLEX) 4 MG tablet, " Take 1 tablet (4 mg total) by mouth nightly as needed., Disp: 30 tablet, Rfl: 3  triamcinolone acetonide 0.1% (KENALOG) 0.1 % cream, Apply topically 3 (three) times daily., Disp: 28.4 g, Rfl: 0  VENTOLIN HFA 90 mcg/actuation inhaler, INHALE 2 PUFFS INTO THE LUNGS  EVERY 4 HOURS AS NEEDED FOR ASTHMA, Disp: 18 g, Rfl: 1    No current facility-administered medications for this visit.       Review of patient's allergies indicates:   -- Crestor (rosuvastatin)     --  Cramping in legs   -- Ezetimibe     --  Other reaction(s): abdominal pain, Diarrhea   -- Hydrocodone     --  Other reaction(s): Itching   -- Lisinopril     --  Other reaction(s): cough   -- Sulfa (sulfonamide antibiotics)     --  Itching and Rash   -- Sulfamethoxazole    -- Sulfamethoxazole-trimethoprim    -- Trimethoprim    -- Valsartan     --  Other reaction(s): Angioedema        Review of Systems   Constitution: Negative for weight gain and weight loss.   HENT: Positive for ear pain. Negative for sore throat.    Cardiovascular: Positive for dyspnea on exertion. Negative for chest pain.   Respiratory: Negative for cough and shortness of breath.    Skin: Positive for dry skin.   Musculoskeletal: Positive for back pain, joint pain, myalgias and neck pain. Negative for joint swelling.   Gastrointestinal: Negative for abdominal pain, bowel incontinence and nausea.   Genitourinary: Negative for bladder incontinence.   Neurological: Positive for dizziness, headaches and numbness (bilateral hands better after injection).         Objective:        General: Henrietta is well-developed, well-nourished, appears stated age, in no acute distress, alert and oriented to time, place and person.     General    Vitals reviewed.  Constitutional: She is oriented to person, place, and time. She appears well-developed and well-nourished.   HENT:   Head: Normocephalic and atraumatic.   Pulmonary/Chest: Effort normal.   Neurological: She is alert and oriented to person, place, and  time.   Psychiatric: She has a normal mood and affect. Her behavior is normal. Judgment and thought content normal.     General Musculoskeletal Exam   Gait: normal     Back (L-Spine & T-Spine) / Neck (C-Spine) Exam     Tenderness Right paramedian tenderness of the Sacrum, Lower T-Spine, Upper L-Spine and Lower L-Spine. Left paramedian tenderness of the Sacrum, Lower L-Spine, Upper L-Spine and Lower T-Spine.     Back (L-Spine & T-Spine) Range of Motion   Extension: 10   Flexion: 70   Lateral bend right: 10   Lateral bend left: 10   Rotation right: 20   Rotation left: 20     Spinal Sensation   Right Side Sensation  C-Spine Level: normal   L-Spine Level: normal  S-Spine Level: normal  Left Side Sensation  C-Spine Level: normal  L-Spine Level: normal  S-Spine Level: normal    Other She has no scoliosis .  Spinal Kyphosis:  Absent      Right Hand/Wrist Exam     Tests   Phalens sign: positive  Tinel's sign (median nerve): positive    Atrophy   Thenar:  negative  Hypothenar:  negative      Left Hand/Wrist Exam     Tests   Phalens sign: positive  Tinel's sign (median nerve): positive    Atrophy  Thenar:  Negative  Hypothenar:  negative          Muscle Strength   Right Upper Extremity   Biceps: 5/5/5   Deltoid:  5/5  Triceps:  5/5  Wrist extension: 5/5/5   : 5/5/5   Finger Flexors:  5/5  Left Upper Extremity  Biceps: 5/5/5   Deltoid:  5/5  Triceps:  5/5  Wrist extension: 5/5/5   :  5/5/5   Finger Flexors:  5/5  Right Lower Extremity   Hip Flexion: 5/5   Quadriceps:  5/5   Anterior tibial:  5/5/5  EHL:  5/5  Left Lower Extremity   Hip Flexion: 5/5   Quadriceps:  5/5   Anterior tibial:  5/5/5   EHL:  5/5    Reflexes     Left Side  Biceps:  2+  Triceps:  2+  Brachioradialis:  2+  Quadriceps:  2+  Achilles:  2+  Left Nichols's Sign:  Absent  Babinski Sign:  absent    Right Side   Biceps:  2+  Triceps:  2+  Brachioradialis:  2+  Quadriceps:  2+  Achilles:  2+  Right Nichols's Sign:  absent  Babinski Sign:   absent    Vascular Exam     Right Pulses        Carotid:                  2+    Left Pulses        Carotid:                  2+    Capillary Refill  Right Hand: normal capillary refill  Left Hand: normal capillary refill              Assessment:       1. Lumbar spondylosis    2. Spinal stenosis of lumbar region, unspecified whether neurogenic claudication present    3. DDD (degenerative disc disease), lumbar    4. Bilateral carpal tunnel syndrome    5. Chronic bilateral low back pain without sciatica    6. Spinal enthesopathy           Plan:          1. Carpal tunnel injection done on 2/2018  2.  hand splints at night  3.she has stopped gabapentin  4. She has been going to PT, she will continue.  She feels better when she goes to PT.  [facility]  5. Continue tizanidine, she says she is taking a half at night, because full pill makes her drowsy, but not sleeping as well.  She does have muscle tenderness.  She does better when comes to PT, It is likely she is not moving much when home.  I encourage her to move her  6.  discussed facet injections, she is not interested,  7.  She is not feeling good and BP high.  We discussed urgent care appointment vs ER vs calling dr. Arriaga.  She is planning on going to urgent care  8.   rtc 3 months      Follow-up: No follow-ups on file. If there are any questions prior to this, the patient was instructed to contact the office.

## 2019-04-02 ENCOUNTER — OFFICE VISIT (OUTPATIENT)
Dept: SPINE | Facility: CLINIC | Age: 79
End: 2019-04-02
Attending: PHYSICAL MEDICINE & REHABILITATION
Payer: MEDICARE

## 2019-04-02 ENCOUNTER — TELEPHONE (OUTPATIENT)
Dept: INTERNAL MEDICINE | Facility: CLINIC | Age: 79
End: 2019-04-02

## 2019-04-02 VITALS
HEIGHT: 68 IN | DIASTOLIC BLOOD PRESSURE: 88 MMHG | HEART RATE: 75 BPM | WEIGHT: 222 LBS | BODY MASS INDEX: 33.65 KG/M2 | SYSTOLIC BLOOD PRESSURE: 204 MMHG | TEMPERATURE: 98 F

## 2019-04-02 DIAGNOSIS — G56.03 BILATERAL CARPAL TUNNEL SYNDROME: ICD-10-CM

## 2019-04-02 DIAGNOSIS — M47.816 LUMBAR SPONDYLOSIS: Primary | ICD-10-CM

## 2019-04-02 DIAGNOSIS — M54.50 CHRONIC BILATERAL LOW BACK PAIN WITHOUT SCIATICA: ICD-10-CM

## 2019-04-02 DIAGNOSIS — G89.29 CHRONIC BILATERAL LOW BACK PAIN WITHOUT SCIATICA: ICD-10-CM

## 2019-04-02 DIAGNOSIS — M46.00 SPINAL ENTHESOPATHY: ICD-10-CM

## 2019-04-02 DIAGNOSIS — M51.36 DDD (DEGENERATIVE DISC DISEASE), LUMBAR: ICD-10-CM

## 2019-04-02 DIAGNOSIS — M48.061 SPINAL STENOSIS OF LUMBAR REGION, UNSPECIFIED WHETHER NEUROGENIC CLAUDICATION PRESENT: ICD-10-CM

## 2019-04-02 PROCEDURE — 99999 PR PBB SHADOW E&M-EST. PATIENT-LVL III: CPT | Mod: PBBFAC,HCNC,, | Performed by: PHYSICAL MEDICINE & REHABILITATION

## 2019-04-02 PROCEDURE — 3077F PR MOST RECENT SYSTOLIC BLOOD PRESSURE >= 140 MM HG: ICD-10-PCS | Mod: HCNC,CPTII,S$GLB, | Performed by: PHYSICAL MEDICINE & REHABILITATION

## 2019-04-02 PROCEDURE — 3079F DIAST BP 80-89 MM HG: CPT | Mod: HCNC,CPTII,S$GLB, | Performed by: PHYSICAL MEDICINE & REHABILITATION

## 2019-04-02 PROCEDURE — 99999 PR PBB SHADOW E&M-EST. PATIENT-LVL III: ICD-10-PCS | Mod: PBBFAC,HCNC,, | Performed by: PHYSICAL MEDICINE & REHABILITATION

## 2019-04-02 PROCEDURE — 99214 PR OFFICE/OUTPT VISIT, EST, LEVL IV, 30-39 MIN: ICD-10-PCS | Mod: HCNC,S$GLB,, | Performed by: PHYSICAL MEDICINE & REHABILITATION

## 2019-04-02 PROCEDURE — 3079F PR MOST RECENT DIASTOLIC BLOOD PRESSURE 80-89 MM HG: ICD-10-PCS | Mod: HCNC,CPTII,S$GLB, | Performed by: PHYSICAL MEDICINE & REHABILITATION

## 2019-04-02 PROCEDURE — 1101F PT FALLS ASSESS-DOCD LE1/YR: CPT | Mod: HCNC,CPTII,S$GLB, | Performed by: PHYSICAL MEDICINE & REHABILITATION

## 2019-04-02 PROCEDURE — 3077F SYST BP >= 140 MM HG: CPT | Mod: HCNC,CPTII,S$GLB, | Performed by: PHYSICAL MEDICINE & REHABILITATION

## 2019-04-02 PROCEDURE — 1101F PR PT FALLS ASSESS DOC 0-1 FALLS W/OUT INJ PAST YR: ICD-10-PCS | Mod: HCNC,CPTII,S$GLB, | Performed by: PHYSICAL MEDICINE & REHABILITATION

## 2019-04-02 PROCEDURE — 99214 OFFICE O/P EST MOD 30 MIN: CPT | Mod: HCNC,S$GLB,, | Performed by: PHYSICAL MEDICINE & REHABILITATION

## 2019-04-02 NOTE — TELEPHONE ENCOUNTER
----- Message from Jamilah Cobos sent at 4/2/2019  3:03 PM CDT -----  Contact: Patient [phone]  Patient would like to get medical advice.  Symptoms (please be specific):  Ear itch & aches, sore throat, body aches, and cough  How long has patient had these symptoms:  3 days  Pharmacy name and phone # [facility] [phone] (Phone)  [phone] (Fax)

## 2019-04-03 NOTE — TELEPHONE ENCOUNTER
----- Message from Deborah Grant sent at 4/3/2019  9:13 AM CDT -----  Contact: [phone]  Patient is requesting a call from the office regarding previous message:  Ear itch & aches, sore throat, body aches, and cough.    Please advise, thank you

## 2019-04-03 NOTE — TELEPHONE ENCOUNTER
X Sunday night, pt has scratchy throat, laryngitis, earache  and sinus pressure. Pt has no fever x Sunday night. Pt would like a Rx called to the pharmacy for her symptoms.

## 2019-04-03 NOTE — TELEPHONE ENCOUNTER
----- Message from Deborah Grant sent at 4/3/2019  9:13 AM CDT -----  Contact: [phone]  Patient is requesting a call from the office regarding previous message:  Ear itch & aches, sore throat, body aches, and cough.    Please advise, thank you

## 2019-04-12 ENCOUNTER — OFFICE VISIT (OUTPATIENT)
Dept: INTERNAL MEDICINE | Facility: CLINIC | Age: 79
End: 2019-04-12
Payer: MEDICARE

## 2019-04-12 VITALS
WEIGHT: 220.81 LBS | BODY MASS INDEX: 33.47 KG/M2 | HEART RATE: 80 BPM | SYSTOLIC BLOOD PRESSURE: 142 MMHG | HEIGHT: 68 IN | DIASTOLIC BLOOD PRESSURE: 90 MMHG

## 2019-04-12 DIAGNOSIS — M54.9 OTHER CHRONIC BACK PAIN: Primary | Chronic | ICD-10-CM

## 2019-04-12 DIAGNOSIS — Z79.4 TYPE 2 DIABETES MELLITUS WITH HYPERGLYCEMIA, WITH LONG-TERM CURRENT USE OF INSULIN: ICD-10-CM

## 2019-04-12 DIAGNOSIS — E11.65 TYPE 2 DIABETES MELLITUS WITH HYPERGLYCEMIA, WITH LONG-TERM CURRENT USE OF INSULIN: ICD-10-CM

## 2019-04-12 DIAGNOSIS — G89.29 OTHER CHRONIC BACK PAIN: Primary | Chronic | ICD-10-CM

## 2019-04-12 DIAGNOSIS — E11.42 DIABETIC POLYNEUROPATHY ASSOCIATED WITH TYPE 2 DIABETES MELLITUS: ICD-10-CM

## 2019-04-12 DIAGNOSIS — I50.30 DIASTOLIC CONGESTIVE HEART FAILURE, UNSPECIFIED HF CHRONICITY: ICD-10-CM

## 2019-04-12 DIAGNOSIS — K21.9 GASTROESOPHAGEAL REFLUX DISEASE WITHOUT ESOPHAGITIS: ICD-10-CM

## 2019-04-12 PROCEDURE — 3080F PR MOST RECENT DIASTOLIC BLOOD PRESSURE >= 90 MM HG: ICD-10-PCS | Mod: HCNC,CPTII,S$GLB, | Performed by: INTERNAL MEDICINE

## 2019-04-12 PROCEDURE — 1101F PR PT FALLS ASSESS DOC 0-1 FALLS W/OUT INJ PAST YR: ICD-10-PCS | Mod: HCNC,CPTII,S$GLB, | Performed by: INTERNAL MEDICINE

## 2019-04-12 PROCEDURE — 3077F PR MOST RECENT SYSTOLIC BLOOD PRESSURE >= 140 MM HG: ICD-10-PCS | Mod: HCNC,CPTII,S$GLB, | Performed by: INTERNAL MEDICINE

## 2019-04-12 PROCEDURE — 99214 PR OFFICE/OUTPT VISIT, EST, LEVL IV, 30-39 MIN: ICD-10-PCS | Mod: HCNC,S$GLB,, | Performed by: INTERNAL MEDICINE

## 2019-04-12 PROCEDURE — 99999 PR PBB SHADOW E&M-EST. PATIENT-LVL III: ICD-10-PCS | Mod: PBBFAC,HCNC,, | Performed by: INTERNAL MEDICINE

## 2019-04-12 PROCEDURE — 1101F PT FALLS ASSESS-DOCD LE1/YR: CPT | Mod: HCNC,CPTII,S$GLB, | Performed by: INTERNAL MEDICINE

## 2019-04-12 PROCEDURE — 99214 OFFICE O/P EST MOD 30 MIN: CPT | Mod: HCNC,S$GLB,, | Performed by: INTERNAL MEDICINE

## 2019-04-12 PROCEDURE — 99999 PR PBB SHADOW E&M-EST. PATIENT-LVL III: CPT | Mod: PBBFAC,HCNC,, | Performed by: INTERNAL MEDICINE

## 2019-04-12 PROCEDURE — 3080F DIAST BP >= 90 MM HG: CPT | Mod: HCNC,CPTII,S$GLB, | Performed by: INTERNAL MEDICINE

## 2019-04-12 PROCEDURE — 3077F SYST BP >= 140 MM HG: CPT | Mod: HCNC,CPTII,S$GLB, | Performed by: INTERNAL MEDICINE

## 2019-04-12 RX ORDER — DULOXETIN HYDROCHLORIDE 60 MG/1
60 CAPSULE, DELAYED RELEASE ORAL 2 TIMES DAILY
Qty: 60 CAPSULE | Refills: 4 | Status: SHIPPED | OUTPATIENT
Start: 2019-04-12 | End: 2019-12-26 | Stop reason: SDUPTHER

## 2019-04-12 RX ORDER — AZELASTINE 1 MG/ML
1 SPRAY, METERED NASAL 2 TIMES DAILY
Qty: 30 ML | Refills: 3 | Status: SHIPPED | OUTPATIENT
Start: 2019-04-12 | End: 2021-04-06

## 2019-04-12 RX ORDER — OXYCODONE AND ACETAMINOPHEN 10; 325 MG/1; MG/1
TABLET ORAL
Qty: 150 TABLET | Refills: 0 | Status: SHIPPED | OUTPATIENT
Start: 2019-04-12 | End: 2019-05-10 | Stop reason: SDUPTHER

## 2019-04-12 NOTE — PROGRESS NOTES
CHIEF COMPLAINT: Follow up of leg pain, dizziness, back pain, hypertension, diabetes     HISTORY OF PRESENT ILLNESS: This is a 79-year-old woman who presents for follow up of above.     She had an injection in the right knee 1/24/19. She will eventually need a knee replacement. The injection helped her right knee. She had a left knee replacement 2012. Right knee is doing well.      No abdominal pain. Occasional nausea.  NO vomiting, constipation, diarrhea, dysuria or hematuria. She is taking miralax 1 capful daily as needed      She is taking Cymbalta 60 mg daily, Oxycodone apap 10/325 3-4 times daily, Pantoprazole 40 mg once daily, carvedilol 25 mg twice daily, Vitamin D3 2000 units daily, Loratadine 10 mg daily as needed, Aspirin 81 mg daily, Furosemide 40 mg once daily, potassium 10 meq twice daily.      No chest pain, shortness of breath, palpitations. Breathing has been ok. She has not needed her albuterol breathing treatments lately. No mucous production.  No fever or chills.  She has occasional sinus congestion.      She is taking Lantus 60 units in the evening and Novolog 23 units once or twice daily with meals.  No hypoglycemia.  Averages 130-140/     Heartburn is controlled. She is taking pantoprazole 40 mg twice daily .     She continues to have low back pain. will starting therapy again in January.  She saw Dr Le 4/2/19.   She had MRI lumbar spine 6/12/18 - Moderate degenerative change at L4-5.  Dr Le recommended epidural - she is not sure if she wants to do the epidural. She takes oxycodone apap 10/325 one tablet 3-5 times daily depending on her pain.. SHe takes Cymbalta 60 mg daily which helps her pain and her mood.  She took gabapentin in the past but it made her feel bad. She takes tizanidine 4 mg 1/2 tablet at bedtime as needed for muscle spasm.      Grandson still is living with her. He got his CDL license and wants to drive trucks.              PAST MEDICAL HISTORY:   1. Diabetes  "mellitus   2. Hypertension   3. Hyperlipidemia   4. Left heart catheterization January 2007 which revealed luminal irregularities in the LAD, left circumflex and right coronary artery. She had diastolic dysfunction and patent renal arteries.   5. Sleep apnea   6. Obesity.   7. Nephrolithiasis status post lithotripsy.   8. Reflux - had nonerosive gastropathy on EGD September 2007. Gastritis on EGD 9/2010  9. Hidradenitis suppurativa.   10. History of diverticulitis with a hospitalized October 2007.   11. Migraines   12. Obesity.   13. 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    14. Total hysterectomy in 1969.   15. Cholecystectomy was done at that time as well.   16. 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   17. Colon polyps on colonscopy 11/2010 - due 2013   18. Hospitalization 12/10 for e coli sepsis due to left ureteral stone with left nephrostomy tube in [place]   19. Left knee replacement 9/2012      MEDICATIONS and ALLERGIES: Updated on EPIC.     PHYSICAL EXAMINATION:   BP (!) 142/90   Pulse 80   Ht 5' 8" (1.727 m)   Wt 100.1 kg (220 lb 12.7 oz)   LMP  (LMP Unknown)   BMI 33.57 kg/m²     GENERAL: She is alert, oriented, no apparent distress. Affect within   normal limits.  Conjunctiva anicteric. . Oropharynx clear.   NECK: Supple.   Respiratory: Effort normal. Lungs clear  HEART: Regular rate and rhythm without murmurs, gallops or rubs.   No lower extremity edema.    ABDOMEN: soft, non distended, non tender, bowel sounds present, no hepatosplenomgaly  .            ASSESSMENT AND PLAN:   1.Knee pain - better after injection.   2.  Mood disorder - increase duloxetine to 60 mg twice daily  3. neck, shoulder and back pain -[facility]  4. Asthma - stable   6. HTN -stable  7. Gerd - take pantoprazole 40 mg twice daily  8. Diabetes with neuropathy- watch sugars  9. Hyperlipidemia - " off lipitor  10. Allergic rhinitis - stable  11. Diastolic congestive heart failure - stable.    12. CRI -stable  13. Obesity - discussed diet, exercise and weight loss  14. CAD -risk factor modification  15. Aortic atherosclerosis and possible mesenteric artery stenosis- risk factor modification  16. Tortuous aorta - HTN controlled  17. Colon polyps - Colonoscopy in 8/2013 - 2 polyps. Flex sig 11/2013 - mild granular mucosa.  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 Elevated uric acid level - no signs of gout currently. Will montior for now.   20. Diverticulosis -asx currently   Recommended colonoscopy in 8/2018. Attempted colonoscopy 11/2016 du to diarrhea which has resolved. She had poor prep with hyperplastic rectal biopsy.   MMG 1/18 - does not want to do today  Prevnar 10/15 and flu shot 11/16  I will see her back in 8 weeks sooner if issues. .

## 2019-04-17 ENCOUNTER — CLINICAL SUPPORT (OUTPATIENT)
Dept: REHABILITATION | Facility: OTHER | Age: 79
End: 2019-04-17
Attending: PHYSICAL MEDICINE & REHABILITATION
Payer: MEDICARE

## 2019-04-17 DIAGNOSIS — M47.816 LUMBAR SPONDYLOSIS: ICD-10-CM

## 2019-04-17 PROCEDURE — 97110 THERAPEUTIC EXERCISES: CPT | Mod: HCNC | Performed by: PHYSICAL THERAPIST

## 2019-04-17 NOTE — PROGRESS NOTES
"[facility]      Name: Henrietta Villasenor  Clinic Number: [id number]    Therapy Diagnosis:   Encounter Diagnosis   Name Primary?    Lumbar spondylosis      Physician: Lizy Le, *    Visit Date: 2019    Precautions: Fall, HTN, CAD, CHF, DM II     Pattern of pain determined: 1 PEN    Medical Dx:  M48.061 (ICD-10-CM) - Spinal stenosis of lumbar region, unspecified whether neurogenic claudication present  M51.36 (ICD-10-CM) - DDD (degenerative disc disease), lumbar    Treatment Orders: PT Eval and Treat  Evaluation Date: 2019  Authorization Period Expiration: 2019  Plan of Care Expiration:   Reassessment Due: 2019  Visit # / Visits authorized:       Time In:  130  Time Out: 230  Total Billable Time: 52 minutes     Face to Face discussion of patient was done between PT and PTA.     Subjective   Henrietta reports that she is doing better than last time.  She was a little sore after last time    Patient reports their pain to be 5/10 on a 0-10 scale with 0 being no pain and 10 being the worst pain imaginable.  Pain Location:  Right knee joint and mid-line low back pain     Occupation:  Retired; Previously a cook   Leisure: Go to [employer] and various social functions-- "I feel limited by my back pain."                     Patients goals:  "I would like to be able to walk for at least 10 blocks again."    Objective     Baseline Isometric Testing on Med X equipment: Testing administered by PT  Date of testin2019  ROM 30-0 deg   Max Peak Torque 164 ft/lbs    Min Peak Torque 24 ft/lbs    Flex/Ext Ratio 6.83   % below normative data -53%   Counter weight 275   Femur 5   Seat pad 0      CMS Impairment/Limitation/Restriction for FOTO LUMBAR Survey     Therapist reviewed FOTO scores for Henrietta Villasenor on 2019.   FOTO documents entered into [employer] - see Media section.     Limitation Score: 55%  Category: Mobility     CMS " Impairment/Limitation/Restriction for FOTO Lumbar Spine Survey                      Status                    Limitation           G-Code CMS Severity Modifier  Intake           45%                       55%                   Current Status CK - At least 40 percent but less than 60 percent  Predicted      51%                      49%                   Goal Status+ CK - At least 40 percent but less than 60 percent          Treatment        Henrietta received therapeutic exercises to develop/improve posture, lumbar  ROM, strength and muscular endurance for 40 minutes including the following exercises:     -Swiss Ball Curls x20, Active Knee Extension Glides x20, Ankle Pumps x20  -LTR x20  -Pelvic Tilts x10 --> TrA +Marching x20  +Single Leg Kicks x20 [Manual Cues for Technique]   -Bridging+Adduction (2# ball) x15 with 3 second holds     [facility] 4/17/2019   Visit Number 8   VAS Pain Rating 5   Recumbent Bike Seat Pos. -   Time 10   Flexion in Lying 10   Lumbar Weight 63   Repetitions 20   Rating of Perceived Exertion 3   Ice - Z Lie (in min.) 10         Peripheral muscle strengthening which included 1 set of 15-20 repetitions at a slow, controlled 10-13 second per rep pace focused on strengthening supporting musculature for improved body mechanics and functional mobility.  Pt and therapist focused on proper form during treatment to ensure optimal strengthening of each targeted muscle group.  Machines were utilized including torso rotation, leg extension, leg curl, chest press, upright row. Tricep extension, bicep curl, leg press, and hip abduction added visit 3    Home Exercise Program as follows:   -LTR, Pelvic Tilts, Red Theraband Supine Clams and Marching, Bridging, Seated Trunk Flexion/Extension  Handouts were given to the patient. Pt demo good understanding of the education provided. Henrietta demonstrated good return demonstration of activities.     Assessment     Henrietta tolerated session well. She  demonstrated good core activation with warm-up exercises given increased manual cues this date for TrA contraction.  She does well when given verbal cues on the MedX to focus on end range control; turn off pacer ball.  Pt performed 5% increase without difficulty and would recommend another 5% increase next visit.  plan to continue advancing as appropriate per protocol to work toward goals.     -She performed x20 reps of 60 ft/lbs at an RPE of 5/10; Increase weight by 5-10% as appropriate and consider increasing flexion range    Pt will continue to benefit from skilled outpatient physical therapy to address the deficits stated in the impairment chart, provide pt/family education and to maximize pt's level of independence in the home and community environment.       Pt's spiritual, cultural and educational needs considered and pt agreeable to plan of care and goals as stated below:     Medical necessity is demonstrated by the following problem list.    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     Personal Factors:   age       moderate   Examination  Body Structures and Functions, activity limitations and participation restrictions that may impact the plan of care Body Regions:   neck  back  lower extremities     Body Systems:    ROM  strength  gross coordinated movement  balance  gait  transfers  motor control     Participation Restrictions:   Limited by pain and weakness; higher fall risk     Activity limitations:   Learning and applying knowledge  listening     General Tasks and Commands  no deficits     Communication  communicating with/receiving spoken language     Mobility  lifting and carrying objects  walking     Self care  no deficits     Domestic Life  cooking  doing house work (cleaning house,  washing dishes, laundry)     Interactions/Relationships  no deficits     Life Areas  no deficits     Community and Social Life  recreation and leisure  Muslim and spirituality             moderate   Clinical Presentation evolving clinical presentation with changing clinical characteristics moderate   Decision Making/ Complexity Score: moderate         GOALS: Pt is in agreement with the following goals.     Short term goals:  6 weeks or 10 visits   1.  Pt will demonstrate increased lumbar ROM by at least 3 degrees from the initial ROM value with improvements noted in functional ROM and ability to perform ADLs Met, 03/27/2019 increased to 36-0 deg  2.  Pt will demonstrate increased maximum isometric torque value by 8% when compared to the initial value resulting in improved ability to perform bending, lifting, and carrying activities safely, confidently. Progressing  3.  Patient report a reduction in worst pain score by 1-2 points for improved tolerance during work and recreational activities Progressing  4.  Pt able to perform HEP correctly with minimal cueing or supervision for therapist Progressing         Long term goals: 13 weeks or 20 visits   1. Pt will demonstrate increased lumbar ROM by at least 6 degrees from initial ROM value, resulting in improved ability to perform functional fwd bending while standing and sitting.   2. Pt will demonstrate increased maximum isometric torque value by 15% when compared to the initial value resulting in improved ability to perform bending, lifting, and carrying activities safely, confidently.  3. Pt to demonstrate ability to independently control and reduce their pain through posture positioning and mechanical movements throughout a typical day.  4.  Patient will demonstrate improved overall function per FOTO Survey to CK = at least 40% but < 60% impaired, limited or restricted score or less.    Plan   Continue with established Plan of Care towards established PT goals.

## 2019-04-23 ENCOUNTER — CLINICAL SUPPORT (OUTPATIENT)
Dept: REHABILITATION | Facility: OTHER | Age: 79
End: 2019-04-23
Attending: PHYSICAL MEDICINE & REHABILITATION
Payer: MEDICARE

## 2019-04-23 DIAGNOSIS — M47.816 LUMBAR SPONDYLOSIS: ICD-10-CM

## 2019-04-23 PROCEDURE — 97110 THERAPEUTIC EXERCISES: CPT | Mod: HCNC

## 2019-04-23 NOTE — PROGRESS NOTES
"[facility]      Name: Henrietta Villasenor  Clinic Number: [id number]    Therapy Diagnosis:   Encounter Diagnosis   Name Primary?    Lumbar spondylosis      Physician: Lizy Le, *    Visit Date: 2019    Precautions: Fall, HTN, CAD, CHF, DM II     Pattern of pain determined: 1 PEN    Medical Dx:  M48.061 (ICD-10-CM) - Spinal stenosis of lumbar region, unspecified whether neurogenic claudication present  M51.36 (ICD-10-CM) - DDD (degenerative disc disease), lumbar    Treatment Orders: PT Eval and Treat  Evaluation Date: 2019  Authorization Period Expiration: 2019  Plan of Care Expiration:   Reassessment Due: 2019  Visit # / Visits authorized:       Time In:  200  Time Out: 300  Total Billable Time: 30 minutes     Face to Face discussion of patient was done between PT and PTA.     Subjective   Henrietta reports that she is doing a little better since starting the program    Patient reports their pain to be 5/10 on a 0-10 scale with 0 being no pain and 10 being the worst pain imaginable.  Pain Location:  Right knee joint and mid-line low back pain     Occupation:  Retired; Previously a cook   Leisure: Go to [employer] and various social functions-- "I feel limited by my back pain."                     Patients goals:  "I would like to be able to walk for at least 10 blocks again."    Objective     Baseline Isometric Testing on Med X equipment: Testing administered by PT  Date of testin2019  ROM 30-0 deg   Max Peak Torque 164 ft/lbs    Min Peak Torque 24 ft/lbs    Flex/Ext Ratio 6.83   % below normative data -53%   Counter weight 275   Femur 5   Seat pad 0      CMS Impairment/Limitation/Restriction for FOTO LUMBAR Survey     Therapist reviewed FOTO scores for Henrietta Villasenor on 2019.   FOTO documents entered into [employer] - see Media section.     Limitation Score: 55%  Category: Mobility     CMS Impairment/Limitation/Restriction for FOTO " Lumbar Spine Survey                      Status                    Limitation           G-Code CMS Severity Modifier  Intake           45%                       55%                   Current Status CK - At least 40 percent but less than 60 percent  Predicted      51%                      49%                   Goal Status+ CK - At least 40 percent but less than 60 percent          Treatment        Henrietta received therapeutic exercises to develop/improve posture, lumbar  ROM, strength and muscular endurance for 60 minutes including the following exercises:   [facility] 4/23/2019   Visit Number 9   VAS Pain Rating 5   Recumbent Bike Seat Pos. -   Time 10   Extension in Lying -   Extension in Standing -   Flexion in Lying 10   Flexion in Sitting -   Lumbar Extension Seat Pad -   Femur Restraint -   Top Dead Center -   Counterweight -   Lumbar Flexion -   Lumbar Extension -   Lumbar Peak Torque -   Lumbar Weight 66   Repetitions 15   Rating of Perceived Exertion 5   Ice - Z Lie (in min.) 10       -Swiss Ball Curls x20, Active Knee Extension Glides x20, Ankle Pumps x20  -LTR x20  -Pelvic Tilts x10 --> TrA +Marching x20  +Single Leg Kicks x20 [Manual Cues for Technique]   -Bridging+Adduction (2# ball) x15 with 3 second holds         Peripheral muscle strengthening which included 1 set of 15-20 repetitions at a slow, controlled 10-13 second per rep pace focused on strengthening supporting musculature for improved body mechanics and functional mobility.  Pt and therapist focused on proper form during treatment to ensure optimal strengthening of each targeted muscle group.  Machines were utilized including torso rotation, leg extension, leg curl, chest press, upright row. Tricep extension, bicep curl, leg press, and hip abduction added visit 3    Home Exercise Program as follows:   -LTR, Pelvic Tilts, Red Theraband Supine Clams and Marching, Bridging, Seated Trunk Flexion/Extension  Handouts were given to the  patient. Pt demo good understanding of the education provided. Henrietta demonstrated good return demonstration of activities.     Assessment     Henrietta tolerated session well. Able to increase 5% on Med X with completion of 15 reps with 5/10 exertion level.  Pt requires verbal and tactile cues for proper form during PPT and Med X.  Pt continues to have difficulty pacing with yellow ball.  Toggle turned off and pt instructed to count to keep pace.  Improved slightly at end of exercise  Pt will continue to benefit from skilled outpatient physical therapy to address the deficits stated in the impairment chart, provide pt/family education and to maximize pt's level of independence in the home and community environment.       Pt's spiritual, cultural and educational needs considered and pt agreeable to plan of care and goals as stated below:     Medical necessity is demonstrated by the following problem list.    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     Personal Factors:   age       moderate   Examination  Body Structures and Functions, activity limitations and participation restrictions that may impact the plan of care Body Regions:   neck  back  lower extremities     Body Systems:    ROM  strength  gross coordinated movement  balance  gait  transfers  motor control     Participation Restrictions:   Limited by pain and weakness; higher fall risk     Activity limitations:   Learning and applying knowledge  listening     General Tasks and Commands  no deficits     Communication  communicating with/receiving spoken language     Mobility  lifting and carrying objects  walking     Self care  no deficits     Domestic Life  cooking  doing house work (cleaning house, washing dishes,  laundry)     Interactions/Relationships  no deficits     Life Areas  no deficits     Community and Social Life  recreation and leisure  [employer] and spirituality             moderate   Clinical Presentation evolving clinical presentation with changing clinical characteristics moderate   Decision Making/ Complexity Score: moderate         GOALS: Pt is in agreement with the following goals.     Short term goals:  6 weeks or 10 visits   1.  Pt will demonstrate increased lumbar ROM by at least 3 degrees from the initial ROM value with improvements noted in functional ROM and ability to perform ADLs Met, 03/27/2019 increased to 36-0 deg  2.  Pt will demonstrate increased maximum isometric torque value by 8% when compared to the initial value resulting in improved ability to perform bending, lifting, and carrying activities safely, confidently. Progressing  3.  Patient report a reduction in worst pain score by 1-2 points for improved tolerance during work and recreational activities Progressing  4.  Pt able to perform HEP correctly with minimal cueing or supervision for therapist Progressing         Long term goals: 13 weeks or 20 visits   1. Pt will demonstrate increased lumbar ROM by at least 6 degrees from initial ROM value, resulting in improved ability to perform functional fwd bending while standing and sitting.   2. Pt will demonstrate increased maximum isometric torque value by 15% when compared to the initial value resulting in improved ability to perform bending, lifting, and carrying activities safely, confidently.  3. Pt to demonstrate ability to independently control and reduce their pain through posture positioning and mechanical movements throughout a typical day.  4.  Patient will demonstrate improved overall function per FOTO Survey to CK = at least 40% but < 60% impaired, limited or restricted score or less.    Plan   Continue with established Plan of Care towards established PT goals.

## 2019-04-29 ENCOUNTER — NURSE TRIAGE (OUTPATIENT)
Dept: ADMINISTRATIVE | Facility: CLINIC | Age: 79
End: 2019-04-29

## 2019-04-29 NOTE — TELEPHONE ENCOUNTER
Reason for Disposition   Swelling of both ankles (i.e., pedal edema)   [1] Can't walk or can barely walk AND [2] new onset    Protocols used: ANKLE SWELLING-A-AH, LEG SWELLING AND EDEMA-A-AH

## 2019-04-29 NOTE — TELEPHONE ENCOUNTER
Spoke with pt, she states that she has been having swelling for the last 3 weeks, she will adjust medication as per pcp request and call back if she isn't feeling any better.

## 2019-05-07 ENCOUNTER — OFFICE VISIT (OUTPATIENT)
Dept: OPTOMETRY | Facility: CLINIC | Age: 79
End: 2019-05-07
Payer: MEDICARE

## 2019-05-07 DIAGNOSIS — H52.31 ANISOMETROPIA: ICD-10-CM

## 2019-05-07 DIAGNOSIS — H26.493 BILATERAL POSTERIOR CAPSULAR OPACIFICATION: ICD-10-CM

## 2019-05-07 DIAGNOSIS — H52.203 ASTIGMATISM OF BOTH EYES, UNSPECIFIED TYPE: ICD-10-CM

## 2019-05-07 DIAGNOSIS — Z01.00 DIABETIC EYE EXAM: ICD-10-CM

## 2019-05-07 DIAGNOSIS — Z96.1 PSEUDOPHAKIA OF BOTH EYES: ICD-10-CM

## 2019-05-07 DIAGNOSIS — Z98.41 S/P BILATERAL CATARACT EXTRACTION: ICD-10-CM

## 2019-05-07 DIAGNOSIS — E11.9 TYPE 2 DIABETES MELLITUS WITHOUT RETINOPATHY: ICD-10-CM

## 2019-05-07 DIAGNOSIS — H35.371 EPIRETINAL MEMBRANE (ERM) OF RIGHT EYE: Primary | ICD-10-CM

## 2019-05-07 DIAGNOSIS — Z98.42 S/P BILATERAL CATARACT EXTRACTION: ICD-10-CM

## 2019-05-07 DIAGNOSIS — E11.9 DIABETIC EYE EXAM: ICD-10-CM

## 2019-05-07 PROCEDURE — 92014 COMPRE OPH EXAM EST PT 1/>: CPT | Mod: HCNC,S$GLB,, | Performed by: OPTOMETRIST

## 2019-05-07 PROCEDURE — 92015 PR REFRACTION: ICD-10-PCS | Mod: HCNC,S$GLB,, | Performed by: OPTOMETRIST

## 2019-05-07 PROCEDURE — 92134 OCT, RETINA - OU - BOTH EYES: ICD-10-PCS | Mod: HCNC,S$GLB,, | Performed by: OPTOMETRIST

## 2019-05-07 PROCEDURE — 92015 DETERMINE REFRACTIVE STATE: CPT | Mod: HCNC,S$GLB,, | Performed by: OPTOMETRIST

## 2019-05-07 PROCEDURE — 92014 PR EYE EXAM, EST PATIENT,COMPREHESV: ICD-10-PCS | Mod: HCNC,S$GLB,, | Performed by: OPTOMETRIST

## 2019-05-07 PROCEDURE — 92134 CPTRZ OPH DX IMG PST SGM RTA: CPT | Mod: HCNC,S$GLB,, | Performed by: OPTOMETRIST

## 2019-05-07 PROCEDURE — 99999 PR PBB SHADOW E&M-EST. PATIENT-LVL III: CPT | Mod: PBBFAC,HCNC,, | Performed by: OPTOMETRIST

## 2019-05-07 PROCEDURE — 99999 PR PBB SHADOW E&M-EST. PATIENT-LVL III: ICD-10-PCS | Mod: PBBFAC,HCNC,, | Performed by: OPTOMETRIST

## 2019-05-07 NOTE — PATIENT INSTRUCTIONS
S/P cataract surgery in both eyes, with bilateral posterior chamber intraocular lens.  Mild posterior capsular opacification of intact posterior lens capsule in both eyes.  No compelling need for YAG laser posterior capsulotomy in either eye at this time.  Monitor.    Residual astigmatic refractive error.  Anisometropia.    Epiretinal membrane in the right eye  Note irregularity of foveal contour in the right eye on OCT of retina done today.  Suggest consult with Dr. Azevedo / Dr. Chan in retina clinic for evaluation and follow-up as deemed appropriate.    New spectacle lens Rx issued for use as desired.  Recommend full-time wear.    Recheck refraction in one year - or prior if so recommended by Dr. Azevedo / Dr. Chan

## 2019-05-07 NOTE — PROGRESS NOTES
"HPI     6mo f/u      Additional comments: Pt returns for 6mo f/u for DFE,OCT and refraction   per Dr. Clemens.  She has broken her most recent pair of glasses and needs updated spectacle   lens Rx.               Comments     Patient's age: 79 y.o.  Occupation: retired   Approximate date of last eye examination:  12/05/2018  Name of last eye doctor seen:   City/State: [facility]  Wears glasses? Yes      If yes, wears  Full-time or part-time?  Full time   Present glasses are: Bifocal, SV Distance, SV Reading?  Bifocals lens   Approximate age of present glasses:  Pt needs a rx for gls. She said her   grandson broke her glasses.   Got new glasses following last exam, or subsequently?:  No    Any problem with VA with glasses?  Broken    Wears CLs?:  no  Headaches?  No   Eye pain/discomfort?  Yes, occasional sharp pain OS in the corner and   tearing                                                                                      Flashes?  Yes, OS  Floaters?  yes  Diplopia/Double vision?  Intermittent - vague report - feels problem is   with left eye  - "feels like there is dirt in it, and it's leaking water"    - reports transient "blue spot" in the visual field of the left eye.   Patient's Ocular History:         Any eye surgeries? S/p cataract surgery OU           Any eye injury?  No          Any treatment for eye disease?  No   Family history of eye disease?  No   Significant patient medical history:         1. Diabetes?  Yes, BS was 140 this morning        If yes, IDDM or NIDDM?   IDDM.  Diagnosed apprx 20 years ago.    2. HBP?  Yes, controlled with meds.               3. Other (describe):  No    ! OTC eyedrops currently using:  No    ! Prescription eye meds currently using:  patanol BID OU   ! Any history of allergy/adverse reaction to any eye meds used   previously?  No     ! Any history of allergy/adverse reaction to eyedrops used during prior   eye exam(s)? No     ! Any history of allergy/adverse reaction to " "Novacaine or similar meds?   No    ! Any history of allergy/adverse reaction to Epinephrine or similar meds?   No     ! Patient okay with use of anesthetic eyedrops to check eye pressure?    Yes         ! Patient okay with use of eyedrops to dilate pupils today?  Yes    !  Allergies/Medications/Medical History/Family History reviewed today?    Yes       PD =   70/66  Desired reading distance =  15"                                                                     Last edited by Asad Chiu, OD on 5/7/2019  4:43 PM. (History)            Assessment /Plan     For exam results, see Encounter Report.    1. Epiretinal membrane (ERM) of right eye  OCT, Retina - OU - Both Eyes    Ambulatory Referral to Ophthalmology   2. Diabetic eye exam     3. Type 2 diabetes mellitus without retinopathy     4. Bilateral posterior capsular opacification     5. S/P bilateral cataract extraction     6. Pseudophakia of both eyes     7. Astigmatism of both eyes, unspecified type     8. Anisometropia                    S/P cataract surgery in both eyes, with bilateral posterior chamber intraocular lens.  Mild posterior capsular opacification of intact posterior lens capsule in both eyes.  No compelling need for YAG laser posterior capsulotomy in either eye at this time.  Monitor.    Residual astigmatic refractive error.  Anisometropia.    Epiretinal membrane in the right eye  Note irregularity of foveal contour in the right eye on OCT of retina done today.  Suggest consult with Dr. Azevedo / Dr. Chan in retina clinic for evaluation and follow-up as deemed appropriate.    New spectacle lens Rx issued for use as desired.  Recommend full-time wear.    Recheck refraction in one year - or prior if so recommended by Dr. Azevedo / Dr. Chan     "

## 2019-05-10 ENCOUNTER — HOSPITAL ENCOUNTER (OUTPATIENT)
Dept: RADIOLOGY | Facility: HOSPITAL | Age: 79
Discharge: HOME | End: 2019-05-10
Attending: INTERNAL MEDICINE
Payer: MEDICARE

## 2019-05-10 ENCOUNTER — HOSPITAL ENCOUNTER (OUTPATIENT)
Dept: RADIOLOGY | Facility: HOSPITAL | Age: 79
Discharge: HOME | End: 2019-05-10
Attending: OBSTETRICS & GYNECOLOGY
Payer: MEDICARE

## 2019-05-10 ENCOUNTER — TELEPHONE (OUTPATIENT)
Dept: INTERNAL MEDICINE | Facility: CLINIC | Age: 79
End: 2019-05-10

## 2019-05-10 ENCOUNTER — OFFICE VISIT (OUTPATIENT)
Dept: INTERNAL MEDICINE | Facility: CLINIC | Age: 79
End: 2019-05-10
Payer: MEDICARE

## 2019-05-10 VITALS
HEIGHT: 68 IN | HEART RATE: 73 BPM | OXYGEN SATURATION: 98 % | DIASTOLIC BLOOD PRESSURE: 70 MMHG | WEIGHT: 217 LBS | SYSTOLIC BLOOD PRESSURE: 120 MMHG | BODY MASS INDEX: 32.89 KG/M2

## 2019-05-10 DIAGNOSIS — E78.5 HYPERLIPIDEMIA, UNSPECIFIED HYPERLIPIDEMIA TYPE: ICD-10-CM

## 2019-05-10 DIAGNOSIS — M79.672 PAIN IN BOTH FEET: Primary | ICD-10-CM

## 2019-05-10 DIAGNOSIS — Z79.4 TYPE 2 DIABETES MELLITUS WITH DIABETIC POLYNEUROPATHY, WITH LONG-TERM CURRENT USE OF INSULIN: ICD-10-CM

## 2019-05-10 DIAGNOSIS — M79.672 PAIN IN BOTH FEET: ICD-10-CM

## 2019-05-10 DIAGNOSIS — I10 ESSENTIAL HYPERTENSION: Chronic | ICD-10-CM

## 2019-05-10 DIAGNOSIS — Z12.31 SCREENING MAMMOGRAM, ENCOUNTER FOR: ICD-10-CM

## 2019-05-10 DIAGNOSIS — M79.671 PAIN IN BOTH FEET: Primary | ICD-10-CM

## 2019-05-10 DIAGNOSIS — M10.9 GOUT, ARTHRITIS: ICD-10-CM

## 2019-05-10 DIAGNOSIS — K21.9 GASTROESOPHAGEAL REFLUX DISEASE WITHOUT ESOPHAGITIS: ICD-10-CM

## 2019-05-10 DIAGNOSIS — M47.816 LUMBAR SPONDYLOSIS: ICD-10-CM

## 2019-05-10 DIAGNOSIS — M79.671 PAIN IN BOTH FEET: ICD-10-CM

## 2019-05-10 DIAGNOSIS — E11.42 TYPE 2 DIABETES MELLITUS WITH DIABETIC POLYNEUROPATHY, WITH LONG-TERM CURRENT USE OF INSULIN: ICD-10-CM

## 2019-05-10 PROCEDURE — 77067 SCR MAMMO BI INCL CAD: CPT | Mod: TC,HCNC

## 2019-05-10 PROCEDURE — 1101F PR PT FALLS ASSESS DOC 0-1 FALLS W/OUT INJ PAST YR: ICD-10-PCS | Mod: HCNC,CPTII,S$GLB, | Performed by: INTERNAL MEDICINE

## 2019-05-10 PROCEDURE — 73610 XR ANKLE COMPLETE 3 VIEW BILATERAL: ICD-10-PCS | Mod: 26,50,HCNC, | Performed by: RADIOLOGY

## 2019-05-10 PROCEDURE — 1101F PT FALLS ASSESS-DOCD LE1/YR: CPT | Mod: HCNC,CPTII,S$GLB, | Performed by: INTERNAL MEDICINE

## 2019-05-10 PROCEDURE — 77067 MAMMO DIGITAL SCREENING BILAT WITH CAD: ICD-10-PCS | Mod: 26,HCNC,, | Performed by: RADIOLOGY

## 2019-05-10 PROCEDURE — 73610 X-RAY EXAM OF ANKLE: CPT | Mod: 26,50,HCNC, | Performed by: RADIOLOGY

## 2019-05-10 PROCEDURE — 73620 XR FOOT AP BILAT: ICD-10-PCS | Mod: 26,50,HCNC, | Performed by: RADIOLOGY

## 2019-05-10 PROCEDURE — 77067 SCR MAMMO BI INCL CAD: CPT | Mod: 26,HCNC,, | Performed by: RADIOLOGY

## 2019-05-10 PROCEDURE — 3078F PR MOST RECENT DIASTOLIC BLOOD PRESSURE < 80 MM HG: ICD-10-PCS | Mod: HCNC,CPTII,S$GLB, | Performed by: INTERNAL MEDICINE

## 2019-05-10 PROCEDURE — 73620 X-RAY EXAM OF FOOT: CPT | Mod: TC,50,HCNC

## 2019-05-10 PROCEDURE — 73610 X-RAY EXAM OF ANKLE: CPT | Mod: 50,TC,HCNC

## 2019-05-10 PROCEDURE — 73620 X-RAY EXAM OF FOOT: CPT | Mod: 26,50,HCNC, | Performed by: RADIOLOGY

## 2019-05-10 PROCEDURE — 99999 PR PBB SHADOW E&M-EST. PATIENT-LVL III: CPT | Mod: PBBFAC,HCNC,, | Performed by: INTERNAL MEDICINE

## 2019-05-10 PROCEDURE — 3074F SYST BP LT 130 MM HG: CPT | Mod: HCNC,CPTII,S$GLB, | Performed by: INTERNAL MEDICINE

## 2019-05-10 PROCEDURE — 99214 OFFICE O/P EST MOD 30 MIN: CPT | Mod: HCNC,S$GLB,, | Performed by: INTERNAL MEDICINE

## 2019-05-10 PROCEDURE — 99999 PR PBB SHADOW E&M-EST. PATIENT-LVL III: ICD-10-PCS | Mod: PBBFAC,HCNC,, | Performed by: INTERNAL MEDICINE

## 2019-05-10 PROCEDURE — 99214 PR OFFICE/OUTPT VISIT, EST, LEVL IV, 30-39 MIN: ICD-10-PCS | Mod: HCNC,S$GLB,, | Performed by: INTERNAL MEDICINE

## 2019-05-10 PROCEDURE — 3074F PR MOST RECENT SYSTOLIC BLOOD PRESSURE < 130 MM HG: ICD-10-PCS | Mod: HCNC,CPTII,S$GLB, | Performed by: INTERNAL MEDICINE

## 2019-05-10 PROCEDURE — 3078F DIAST BP <80 MM HG: CPT | Mod: HCNC,CPTII,S$GLB, | Performed by: INTERNAL MEDICINE

## 2019-05-10 RX ORDER — PREDNISONE 10 MG/1
TABLET ORAL
Qty: 12 TABLET | Refills: 0 | Status: SHIPPED | OUTPATIENT
Start: 2019-05-10 | End: 2019-06-10

## 2019-05-10 RX ORDER — OXYCODONE AND ACETAMINOPHEN 10; 325 MG/1; MG/1
TABLET ORAL
Qty: 150 TABLET | Refills: 0 | Status: SHIPPED | OUTPATIENT
Start: 2019-05-10 | End: 2019-06-10 | Stop reason: SDUPTHER

## 2019-05-10 RX ORDER — CALCIUM CARB/VITAMIN D3/VIT K1 500-500-40
400 TABLET,CHEWABLE ORAL DAILY
COMMUNITY
End: 2020-03-11

## 2019-05-10 RX ORDER — ALLOPURINOL 100 MG/1
100 TABLET ORAL DAILY
Qty: 90 TABLET | Refills: 3 | Status: SHIPPED | OUTPATIENT
Start: 2019-05-10 | End: 2019-11-18 | Stop reason: SDUPTHER

## 2019-05-10 NOTE — PROGRESS NOTES
CHIEF COMPLAINT: Follow up of leg pain, dizziness, back pain, hypertension, diabetes     HISTORY OF PRESENT ILLNESS: This is a 79-year-old woman who presents for follow up of above.     She brought all of her medication with her today    She has had more ankle pain lately, left worse than right. Her feet get red and swollen at times. It is very painful    She is now taking duloxetine 60 mg twice daily. Mood is still not good.     She takes tizanidine 4 mg at bedtime to help with muscle spasm of her back. She continues to have back pain. She is taking Oxycodone apap 10/325 3-5 times daily which helps her back. She had to stop physcial therapy due to her foot and ankle pain.    She had an injection in the right knee 1/24/19. She will eventually need a knee replacement. The injection helped her right knee. She had a left knee replacement 2012. Right knee is doing well.      No abdominal pain. Occasional nausea.  NO vomiting, constipation, diarrhea, dysuria or hematuria. She is taking miralax 1 capful daily as needed      She is taking Pantoprazole 40 mg twice daily, carvedilol 25 mg twice daily, Vitamin D3 400 units daily, Loratadine 10 mg daily as needed, Aspirin 81 mg daily, Furosemide 40 mg twice daily, potassium 10 meq twice daily.       No chest pain, shortness of breath, palpitations. Breathing has been ok. She has not needed her albuterol breathing treatments lately. No mucous production.  No fever or chills.  She has occasional sinus congestion.      She is taking Lantus 60 units in the evening and Novolog 23 units once or twice daily with meals.  No hypoglycemia.  Averages 130-140/     Heartburn is controlled. She is taking pantoprazole 40 mg twice daily .     Grandson still is living with her. He got his CDL license and wants to drive trucks.       Vitamin D 400 units daily     PAST MEDICAL HISTORY:   1. Diabetes mellitus   2. Hypertension   3. Hyperlipidemia   4. Left heart catheterization January 2007 which  "revealed luminal irregularities in the LAD, left circumflex and right coronary artery. She had diastolic dysfunction and patent renal arteries.   5. Sleep apnea   6. Obesity.   7. Nephrolithiasis status post lithotripsy.   8. Reflux - had nonerosive gastropathy on EGD September 2007. Gastritis on EGD 9/2010  9. Hidradenitis suppurativa.   10. History of diverticulitis with a hospitalized October 2007.   11. Migraines   12. Obesity.   13. 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    14. Total hysterectomy in 1969.   15. Cholecystectomy was done at that time as well.   16. 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   17. Colon polyps on colonscopy 11/2010 - due 2013   18. Hospitalization 12/10 for e coli sepsis due to left ureteral stone with left nephrostomy tube in [place], [place]   19. Left knee replacement 9/2012      MEDICATIONS and ALLERGIES: Updated on EPIC.     PHYSICAL EXAMINATION:   /82 (BP Location: Right arm, Patient Position: Sitting, BP Method: Large (Manual))   Pulse 73   Ht 5' 8" (1.727 m)   Wt 98.4 kg (217 lb)   LMP  (LMP Unknown)   SpO2 98%   BMI 32.99 kg/m²      GENERAL: She is alert, oriented, no apparent distress. Affect within   normal limits.  Conjunctiva anicteric. . Oropharynx clear.   NECK: Supple.   Respiratory: Effort normal. Lungs clear  HEART: Regular rate and rhythm without murmurs, gallops or rubs.   No lower extremity edema.    ABDOMEN: soft, non distended, non tender, bowel sounds present, no hepatosplenomgaly  .   Labs 2/13/19 reviewed - uric acid level elevated.         ASSESSMENT AND PLAN:   1. Uric acid level - gout is likely causing foot pain. Will xray feet and ankles.  Start allopurinol 100 mg daily and give prednisone taper to start.   2.  Mood disorder - increase duloxetine to 60 mg twice daily  3. neck, shoulder and back pain -follow up with back [facility] spine center  4. Asthma - stable   6. " HTN -stable  7. Gerd - take pantoprazole 40 mg twice daily  8. Diabetes with neuropathy- watch sugars  9. Hyperlipidemia - off lipitor  10. Allergic rhinitis - stable  11. Diastolic congestive heart failure - stable.    12. CRI -stable  13. Obesity - discussed diet, exercise and weight loss  14. CAD -risk factor modification  15. Aortic atherosclerosis and possible mesenteric artery stenosis- risk factor modification  16. Tortuous aorta - HTN controlled  17. Colon polyps - Colonoscopy in 8/2013 - 2 polyps. Flex sig 11/2013 - mild granular mucosa.  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 . Diverticulosis -asx currently   Recommended colonoscopy in 8/2018. Attempted colonoscopy 11/2016 du to diarrhea which has resolved. She had poor prep with hyperplastic rectal biopsy.   MMG 1/18 - does not want to do today  Prevnar 10/15 and flu shot 11/16  I will see her back in 8 weeks sooner if issues. .

## 2019-05-10 NOTE — TELEPHONE ENCOUNTER
----- Message from Jeannine Arriaga MD sent at 5/10/2019  5:42 PM CDT -----  Please notify pt that xray of her feet and ankles reveals some arthritis.  NO fractures

## 2019-05-16 ENCOUNTER — OFFICE VISIT (OUTPATIENT)
Dept: PODIATRY | Facility: CLINIC | Age: 79
End: 2019-05-16
Payer: MEDICARE

## 2019-05-16 VITALS
WEIGHT: 222.69 LBS | HEIGHT: 68 IN | HEART RATE: 71 BPM | BODY MASS INDEX: 33.75 KG/M2 | RESPIRATION RATE: 18 BRPM | SYSTOLIC BLOOD PRESSURE: 133 MMHG | DIASTOLIC BLOOD PRESSURE: 78 MMHG

## 2019-05-16 DIAGNOSIS — E11.42 DIABETIC POLYNEUROPATHY ASSOCIATED WITH TYPE 2 DIABETES MELLITUS: Primary | ICD-10-CM

## 2019-05-16 DIAGNOSIS — L84 CORN OR CALLUS: ICD-10-CM

## 2019-05-16 DIAGNOSIS — B35.1 ONYCHOMYCOSIS DUE TO DERMATOPHYTE: ICD-10-CM

## 2019-05-16 PROCEDURE — 11721 DEBRIDE NAIL 6 OR MORE: CPT | Mod: 59,Q9,HCNC,S$GLB | Performed by: PODIATRIST

## 2019-05-16 PROCEDURE — 99499 UNLISTED E&M SERVICE: CPT | Mod: HCNC,S$GLB,, | Performed by: PODIATRIST

## 2019-05-16 PROCEDURE — 11721 PR DEBRIDEMENT OF NAILS, 6 OR MORE: ICD-10-PCS | Mod: 59,Q9,HCNC,S$GLB | Performed by: PODIATRIST

## 2019-05-16 PROCEDURE — 11057 PR TRIM BENIGN HYPERKERATOTIC SKIN LESION,>4: ICD-10-PCS | Mod: Q9,HCNC,S$GLB, | Performed by: PODIATRIST

## 2019-05-16 PROCEDURE — 11057 PARNG/CUTG B9 HYPRKR LES >4: CPT | Mod: Q9,HCNC,S$GLB, | Performed by: PODIATRIST

## 2019-05-16 PROCEDURE — 99499 NO LOS: ICD-10-PCS | Mod: HCNC,S$GLB,, | Performed by: PODIATRIST

## 2019-05-16 PROCEDURE — 99999 PR PBB SHADOW E&M-EST. PATIENT-LVL III: ICD-10-PCS | Mod: PBBFAC,HCNC,, | Performed by: PODIATRIST

## 2019-05-16 PROCEDURE — 99999 PR PBB SHADOW E&M-EST. PATIENT-LVL III: CPT | Mod: PBBFAC,HCNC,, | Performed by: PODIATRIST

## 2019-05-16 NOTE — PATIENT INSTRUCTIONS
Treating Arthritis in the Foot  If your symptoms are mild, medications may be enough to reduce pain and swelling. For more severe arthritis, surgery may be needed to improve the condition of the joint.    Medicine  Your doctor may prescribe medicine--pills or injections--to limit pain and swelling. Ice, aspirin, acetaminophen, or ibuprofen may help relieve mild symptoms that occur after activity.  Surgery and bone trimming  To ease movement and reduce pain, your doctor may trim damaged bone. If arthritis is severe, the joint may be fused or removed. If the bone is not damaged too badly, your doctor may simply shave away bone spurs. Any excess bone growth related to a bunion may also be trimmed.  Fusing joints  If damage is more severe, your doctor may fuse the joint to prevent the bones from rubbing. Afterward, staples, plates, or screws may hold the bones in place so they heal properly. In some cases, the joint may be removed and replaced with an implant.  After surgery  During the early stages of recovery, your foot is likely to be bandaged and immobilized for a while. For best results, follow up with your doctor as scheduled. These visits help ensure that your foot heals properly.  As you heal  After surgery, youll be told how to care for your incision and how soon to begin walking on the foot. Until the foot can bear weight, you may need to walk with crutches or a cane.  For surgery on the big toe, your foot may be splinted to limit movement for several weeks. Despite this, you should be able to walk soon after surgery.  For surgery on rear or midfoot joints, you may need to wear a cast or surgical shoe. These joints are fairly large, so full recovery may take a few months. Once the bone has healed, any staples, plates, or screws may be removed.  Date Last Reviewed: 7/1/2016  © 2762-6865 [employer] [address] [place] [address], [place] [address]. All rights reserved. This information is not intended  as a substitute for professional medical care. Always follow your healthcare professional's instructions.        Gout Diet  Gout is a painful condition caused by an excess of uric acid, a waste product made by the body. Uric acid forms crystals that collect in the joints. The immune response to these crystals brings on symptoms of joint pain and swelling. This is called a gout attack. Often, medications and diet changes are combined to manage gout. Below are some guidelines for changing your diet to help you manage gout and prevent attacks. Your health care provider will help you determine the best eating plan for you.     Eating to manage gout  Weight loss for those who are overweight may help reduce gout attacks.  Eat less of these foods  Eating too many foods containing purines may raise the levels of uric acid in your body. This raises your risk for a gout attack. Try to limit these foods and drinks:  · Alcohol, such as beer and red wine. You may be told to avoid alcohol completely.  · Soft drinks that contain sugar or high fructose corn syrup  · Certain fish, including anchovies, sardines, fish eggs, and herring  · Shellfish  · Certain meats, such as red meat, hot dogs, luncheon meats, and turkey  · Organ meats, such as liver, kidneys, and sweetbreads  · Legumes, such as dried beans and peas  · Other high fat foods such as gravy, whole milk, and high fat cheeses  · Vegetables such as asparagus, cauliflower, spinach, and mushrooms used to be thought to contribute to an increased risk for a gout attack, but recent studies show that high purine vegetables don't increase the risk for a gout attack.  Eat more of these foods  Other foods may be helpful for people with gout. Add some of these foods to your diet:  · Cherries contain chemicals that may lower uric acid.  · Omega fatty acids. These are found in some fatty fish such as salmon, certain oils (flax, olive, or nut), and nuts themselves. Omega fatty acids may  help prevent inflammation due to gout.  · Dairy products that are low-fat or fat-free, such as cheese and yogurt  · Complex carbohydrate foods, including whole grains, brown rice, oats, and beans  · Coffee, in moderation  · Water, approximately 64 ounces per day  Follow-up care  Follow up with your healthcare provider as advised.  When to seek medical advice  Call your healthcare provider right away if any of these occur:  · Return of gout symptoms, usually at night:  · Severe pain, swelling, and heat in a joint, especially the base of the big toe  · Affected joint is hard to move  · Skin of the affected joint is purple or red  · Fever of 100.4°F (38°C) or higher  · Pain that doesn't get better even with prescribed medicine   Date Last Reviewed: 1/12/2016 © 2000-2017 [employer]. [address] [place] [id number]. All rights reserved. This information is not intended as a substitute for professional medical care. Always follow your healthcare professional's instructions.        Eating to Prevent Gout  Gout is a painful form of arthritis caused by an excess of uric acid. This is a waste product made by the body. It builds up in the body and forms crystals that collect in the joints, bringing on a gout attack. Alcohol and certain foods can trigger a gout attack. Below are some guidelines for changing your diet to help you manage gout. Your healthcare provider can work with you to determine the best eating plan for you. Know that diet is only one part of managing gout. Take your medicines as prescribed and follow the other guidelines your healthcare provider has given you.  Foods to limit  Eating too many foods containing purines may increase the levels of uric acid in your body and increase your risk for a gout attack. It may be best to limit these high-purine foods:  · Alcohol (beer, red wine). You may be told to avoid alcohol completely.  · Certain fish (anchovies, sardines, fish roes,  herring, tuna, mussels, codfish, scallops, trout, and beth)  · Certain meats (red meat, processed meat, eldridge, turkey, wild game, and goose)  · Sauces and gravies made with meat  · Organ meats (such as liver, kidneys, sweetbreads, and tripe)  · Legumes (such as dried beans, peas)  · Mushrooms, spinach, asparagus, and cauliflower  · Yeast and yeast extract supplements  Foods to try  Some foods may be helpful for people with gout. You may want to try adding some of the following foods to your diet:  · Dark berries: These include blueberries, blackberries, and cherries. These berries contain chemicals that may lower uric acid.  · Tofu: Tofu, which is made from soy, is a good source of protein. Studies have shown that it may be a better choice than meat for people with gout.  · Omega fatty acids: These acids are found in fatty fish (such as salmon), certain oils (such as flax, olive, or nut oils), or nuts. They may help prevent inflammation due to gout.  The following guidelines are recommended by the American Medical Association for people with gout. Your diet should be:  · High in fiber, whole grains, fruits, and vegetables.  · Low in protein (15% of calories should come from protein. Choose lean sources such as soy, lean meats, and poultry).  · Low in fat (no more than 30% of calories should come from fat, with only 10% coming from animal fat).   Date Last Reviewed: 6/17/2015  © 2560-3353 [employer] [address] [place] [address], [place] [address]. All rights reserved. This information is not intended as a substitute for professional medical care. Always follow your healthcare professional's instructions.

## 2019-05-16 NOTE — PROGRESS NOTES
Subjective:      Patient ID: Henrietta Villasenor is a 79 y.o. female.    Chief Complaint: PCP (Jeannine Arriaga MD 5/10/19); Diabetic Foot Exam; Foot Problem (gaout and Arthritis ); Foot Pain (Swelling ); and Nail Care    Henrietta is a 79 y.o. female who presents to the clinic for evaluation and treatment of high risk feet. Henrietta has a past medical history of Abnormality of lung (11/08/2011), Anxiety, Arthritis, CAD (coronary artery disease), Calculus of ureter (2/22/2011), Cataract, CHF (congestive heart failure), Chronic back pain (7/29/2012), Colon polyp (9/2010; 11/2010), Colon polyps (7/29/2012), Coronary artery disease involving native coronary artery of native heart with angina pectoris, Depression, Diabetes mellitus, Diabetes mellitus type II, Diverticulitis large intestine (7/27/2015), Diverticulosis (09/25/2010; 11/02/2010; 11/08/2011; 7/29/2012), Duodenal disorder (08/25/2011), Duodenal ulcer, unspecified as acute or chronic, without hemorrhage, perforation, or obstruction (8/24/2011), E. 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), 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), Obesity, MARLON (obstructive sleep apnea), Pain, Peripheral neuropathy, Pneumonia, Polyneuropathy, Postherpetic neuralgia, Recurrent nephrolithiasis, S/P knee replacement (10/2/2012), S/P TKR (total knee replacement) (12/26/2012), Sensorineural hearing loss of both ears, Thyroid disease  (11/08/2011), Trouble in sleeping, Type II or unspecified type diabetes mellitus with neurological manifestations, not stated as uncontrolled(250.60), Type II or unspecified type diabetes mellitus with peripheral circulatory disorders, not stated as uncontrolled(250.70), and Type II or unspecified type diabetes mellitus with renal manifestations, not stated as uncontrolled(250.40). The patient's chief complaint is long, thick toenails. This patient has documented high risk feet requiring routine maintenance secondary to diabetes mellitis and those secondary complications of diabetes, as mentioned..       PCP: Jeannine Arriaga MD    Date Last Seen by PCP:   Chief Complaint   Patient presents with    PCP     Jeannine Arriaga MD 5/10/19    Diabetic Foot Exam    Foot Problem     gaout and Arthritis     Foot Pain     Swelling     Nail Care         Current shoe gear: casual shoes    Hemoglobin A1C   Date Value Ref Range Status   02/13/2019 7.3 (H) 4.0 - 5.6 % Final     Comment:     ADA Screening Guidelines:  5.7-6.4%  Consistent with prediabetes  >or=6.5%  Consistent with diabetes  High levels of fetal hemoglobin interfere with the HbA1C  assay. Heterozygous hemoglobin variants (HbS, HgC, etc)do  not significantly interfere with this assay.   However, presence of multiple variants may affect accuracy.     10/03/2018 8.4 (H) 4.0 - 5.6 % Final     Comment:     ADA Screening Guidelines:  5.7-6.4%  Consistent with prediabetes  >or=6.5%  Consistent with diabetes  High levels of fetal hemoglobin interfere with the HbA1C  assay. Heterozygous hemoglobin variants (HbS, HgC, etc)do  not significantly interfere with this assay.   However, presence of multiple variants may affect accuracy.     04/19/2018 7.7 (H) 4.0 - 5.6 % Final     Comment:     According to ADA guidelines, hemoglobin A1c <7.0% represents  optimal control in non-pregnant diabetic patients. Different  metrics may apply to specific patient populations.  "  Standards of Medical Care in Diabetes-2016.  For the purpose of screening for the presence of diabetes:  <5.7%     Consistent with the absence of diabetes  5.7-6.4%  Consistent with increasing risk for diabetes   (prediabetes)  >or=6.5%  Consistent with diabetes  Currently, no consensus exists for use of hemoglobin A1c  for diagnosis of diabetes for children.  This Hemoglobin A1c assay has significant interference with fetal   hemoglobin   (HbF). The results are invalid for patients with abnormal amounts of   HbF,   including those with known Hereditary Persistence   of Fetal Hemoglobin. Heterozygous hemoglobin variants (HbAS, HbAC,   HbAD, HbAE, HbA2) do not significantly interfere with this assay;   however, presence of multiple variants in a sample may impact the %   interference.         Review of Systems   Constitution: Negative for chills, decreased appetite and fever.   Cardiovascular: Negative for leg swelling.   Skin: Positive for dry skin and nail changes. Negative for color change, flushing, itching, poor wound healing and rash.   Musculoskeletal: Positive for arthritis and gout. Negative for back pain, falls, joint pain, joint swelling, muscle cramps and myalgias.   Gastrointestinal: Negative for nausea and vomiting.   Neurological: Positive for numbness and paresthesias. Negative for loss of balance.           Objective:       Vitals:    05/16/19 1005   BP: 133/78   Pulse: 71   Resp: 18   Weight: 101 kg (222 lb 10.6 oz)   Height: 5' 8" (1.727 m)   PainSc:   7   PainLoc: Foot        Physical Exam   Constitutional: She is oriented to person, place, and time. She appears well-developed and well-nourished.   Cardiovascular:   Dorsalis pedis and posterior tibial pulses are diminished Bilaterally. Toes are cool to touch. Feet are warm proximally.There is decreased digital hair. Skin is atrophic, slightly hyperpigmented, and mildly edematous       Musculoskeletal: Normal range of motion. She exhibits no " edema or tenderness.   Adequate joint range of motion without pain, limitation, nor crepitation Bilateral feet and ankle joints. Muscle strength is 5/5 in all groups bilaterally.      semi reducible IPJ digital contracture 2nd toe b/l    Neurological: She is alert and oriented to person, place, and time.   [place] 5.07 monofilamant testing is diminished Mynor feet. Sharp/dull sensation diminished Bilaterally. Light touch absent Bilaterally.       Skin: Skin is warm, dry and intact. No abrasion, no bruising, no burn, no ecchymosis and no lesion noted. She is not diaphoretic. No erythema. No pallor.   Nails x 10  are elongated by 3-6mm's, thickened by 2-5 mm's, dystrophic, and are darkened in  coloration . Xerosis Bilaterally. No open lesions noted.    Hyperkeratotic tissue noted to distal toes 1-4 b/l        Psychiatric: She has a normal mood and affect. Her behavior is normal.   Nursing note and vitals reviewed.            Assessment:       Encounter Diagnoses   Name Primary?    Diabetic polyneuropathy associated with type 2 diabetes mellitus Yes    Onychomycosis due to dermatophyte     Corn or callus          Plan:       Henrietta was seen today for pcp, diabetic foot exam, foot problem, foot pain and nail care.    Diagnoses and all orders for this visit:    Diabetic polyneuropathy associated with type 2 diabetes mellitus    Onychomycosis due to dermatophyte    Corn or callus      I counseled the patient on her conditions, their implications and medical management.    Shoe inspection. Diabetic Foot Education. Patient reminded of the importance of good nutrition and blood sugar control to help prevent podiatric complications of diabetes. Patient instructed on proper foot hygeine. We discussed wearing proper shoe gear, daily foot inspections, never walking without protective shoe gear, never putting sharp instruments to feet    - With patient's permission, nails were aggressively reduced and debrided x 10 to  their soft tissue attachment mechanically and with electric , removing all offending nail and debris. Patient relates relief following the procedure. She will continue to monitor the areas daily, inspect her feet, wear protective shoe gear when ambulatory, moisturizer to maintain skin integrity and follow in this office in approximately 2-3 months, sooner p.r.n.    - After cleansing the  area w/ alcohol prep pad the above mentioned hyperkeratosis was trimmed utilizing No 15 scapel, to a smooth base with out incident. Patient tolerated this  well and reported comfort to the area of distal toes 1-4 b/l   .

## 2019-05-21 ENCOUNTER — TELEPHONE (OUTPATIENT)
Dept: INTERNAL MEDICINE | Facility: CLINIC | Age: 79
End: 2019-05-21

## 2019-05-21 RX ORDER — PROMETHAZINE HYDROCHLORIDE AND DEXTROMETHORPHAN HYDROBROMIDE 6.25; 15 MG/5ML; MG/5ML
SYRUP ORAL
Qty: 180 ML | Refills: 1 | Status: SHIPPED | OUTPATIENT
Start: 2019-05-21 | End: 2019-09-19 | Stop reason: SDUPTHER

## 2019-05-21 RX ORDER — FLUCONAZOLE 150 MG/1
150 TABLET ORAL EVERY OTHER DAY
Qty: 3 TABLET | Refills: 0 | Status: SHIPPED | OUTPATIENT
Start: 2019-05-21 | End: 2019-05-24

## 2019-05-21 NOTE — TELEPHONE ENCOUNTER
----- Message from Ngoc Coy sent at 5/21/2019 10:47 AM CDT -----  Contact: Patient [phone]  Since taking new medicine patient has a rash between her legs, infection in uterus and itching  Left side pain and patient has a cold.    Patient needs to discuss please return call.    Please call and advise  Thank you

## 2019-05-21 NOTE — TELEPHONE ENCOUNTER
Pt has a yeast infection from antibiotics that she was given for gout? Pt not sure what PCP gave her however the rash appeared while and after she finished taking the medication. Pt would like a Rx called in for the rash. Pt has vaginal itching and light discharge. Pt also has a hacking cough that kept her up all night, along with burning during urination.

## 2019-05-21 NOTE — TELEPHONE ENCOUNTER
Spoke with pt, she says she has a Rash between her legs, a persistent cough and pain on her left side that radiates from her breast bone, down to her stomach and to her hip. Pt says all of this started after begenning medication (prednisone). Should pt be seen? Please advise

## 2019-05-22 ENCOUNTER — TELEPHONE (OUTPATIENT)
Dept: INTERNAL MEDICINE | Facility: CLINIC | Age: 79
End: 2019-05-22

## 2019-05-22 RX ORDER — AMOXICILLIN AND CLAVULANATE POTASSIUM 875; 125 MG/1; MG/1
1 TABLET, FILM COATED ORAL 2 TIMES DAILY
Qty: 20 TABLET | Refills: 0 | Status: SHIPPED | OUTPATIENT
Start: 2019-05-22 | End: 2019-06-01

## 2019-05-22 NOTE — TELEPHONE ENCOUNTER
Please let pt know I Sent in a prescription for augmentin 875mg twice daily for 10 days for the green mucous

## 2019-05-22 NOTE — TELEPHONE ENCOUNTER
Please notify pt    Dr YANEZ sent in a pill for the yeast infection under the breasts and in the vaginal area.  You are to take fluconazole 150 mg EVERY OTHER DAY for 3 dose for the yeast infection.  Also put desitin diaper rash cream in the vaginal irritation.    Dr YANEZ sent in a prescription for some cough medication that should help you sleep better    What color mucous are you coughing up?    SF

## 2019-05-29 RX ORDER — LANCETS 33 GAUGE
EACH MISCELLANEOUS
Qty: 400 EACH | Refills: 4 | Status: SHIPPED | OUTPATIENT
Start: 2019-05-29 | End: 2023-02-22 | Stop reason: SDUPTHER

## 2019-05-29 RX ORDER — ISOPROPYL ALCOHOL 0.75 G/1
SWAB TOPICAL
Qty: 400 EACH | Refills: 4 | Status: SHIPPED | OUTPATIENT
Start: 2019-05-29 | End: 2020-07-01

## 2019-05-30 RX ORDER — FUROSEMIDE 40 MG/1
TABLET ORAL
Qty: 270 TABLET | Refills: 0 | Status: SHIPPED | OUTPATIENT
Start: 2019-05-30 | End: 2019-07-31 | Stop reason: SDUPTHER

## 2019-06-04 ENCOUNTER — OFFICE VISIT (OUTPATIENT)
Dept: URGENT CARE | Facility: CLINIC | Age: 79
End: 2019-06-04
Payer: MEDICARE

## 2019-06-04 ENCOUNTER — INITIAL CONSULT (OUTPATIENT)
Dept: OPHTHALMOLOGY | Facility: CLINIC | Age: 79
End: 2019-06-04
Payer: MEDICARE

## 2019-06-04 VITALS
WEIGHT: 222 LBS | TEMPERATURE: 99 F | OXYGEN SATURATION: 98 % | DIASTOLIC BLOOD PRESSURE: 84 MMHG | SYSTOLIC BLOOD PRESSURE: 162 MMHG | BODY MASS INDEX: 33.65 KG/M2 | HEIGHT: 68 IN | HEART RATE: 77 BPM | RESPIRATION RATE: 16 BRPM

## 2019-06-04 DIAGNOSIS — R10.9 ABDOMINAL PAIN, UNSPECIFIED ABDOMINAL LOCATION: ICD-10-CM

## 2019-06-04 DIAGNOSIS — H35.372 EPIRETINAL MEMBRANE, LEFT: ICD-10-CM

## 2019-06-04 DIAGNOSIS — H43.821 VITREOMACULAR ADHESION, RIGHT: Primary | ICD-10-CM

## 2019-06-04 DIAGNOSIS — S39.011A STRAIN OF ABDOMINAL WALL, INITIAL ENCOUNTER: Primary | ICD-10-CM

## 2019-06-04 LAB
BILIRUB UR QL STRIP: NEGATIVE
GLUCOSE UR QL STRIP: NEGATIVE
KETONES UR QL STRIP: NEGATIVE
LEUKOCYTE ESTERASE UR QL STRIP: POSITIVE
PH, POC UA: 6 (ref 5–8)
POC BLOOD, URINE: NEGATIVE
POC NITRATES, URINE: NEGATIVE
PROT UR QL STRIP: NEGATIVE
SP GR UR STRIP: 1.02 (ref 1–1.03)
UROBILINOGEN UR STRIP-ACNC: NORMAL (ref 0.1–1.1)

## 2019-06-04 PROCEDURE — 1101F PR PT FALLS ASSESS DOC 0-1 FALLS W/OUT INJ PAST YR: ICD-10-PCS | Mod: CPTII,S$GLB,, | Performed by: NURSE PRACTITIONER

## 2019-06-04 PROCEDURE — 92134 CPTRZ OPH DX IMG PST SGM RTA: CPT | Mod: HCNC,S$GLB,, | Performed by: OPHTHALMOLOGY

## 2019-06-04 PROCEDURE — 99999 PR PBB SHADOW E&M-EST. PATIENT-LVL III: CPT | Mod: PBBFAC,HCNC,, | Performed by: OPHTHALMOLOGY

## 2019-06-04 PROCEDURE — 99999 PR PBB SHADOW E&M-EST. PATIENT-LVL III: ICD-10-PCS | Mod: PBBFAC,HCNC,, | Performed by: OPHTHALMOLOGY

## 2019-06-04 PROCEDURE — 74019 XR ABDOMEN FLAT AND ERECT: ICD-10-PCS | Mod: S$GLB,,, | Performed by: RADIOLOGY

## 2019-06-04 PROCEDURE — 3077F PR MOST RECENT SYSTOLIC BLOOD PRESSURE >= 140 MM HG: ICD-10-PCS | Mod: CPTII,S$GLB,, | Performed by: NURSE PRACTITIONER

## 2019-06-04 PROCEDURE — 92014 COMPRE OPH EXAM EST PT 1/>: CPT | Mod: HCNC,S$GLB,, | Performed by: OPHTHALMOLOGY

## 2019-06-04 PROCEDURE — 92014 PR EYE EXAM, EST PATIENT,COMPREHESV: ICD-10-PCS | Mod: HCNC,S$GLB,, | Performed by: OPHTHALMOLOGY

## 2019-06-04 PROCEDURE — 92226 PR SPECIAL EYE EXAM, SUBSEQUENT: CPT | Mod: 50,HCNC,S$GLB, | Performed by: OPHTHALMOLOGY

## 2019-06-04 PROCEDURE — 92134 POSTERIOR SEGMENT OCT RETINA (OCULAR COHERENCE TOMOGRAPHY)-BOTH EYES: ICD-10-PCS | Mod: HCNC,S$GLB,, | Performed by: OPHTHALMOLOGY

## 2019-06-04 PROCEDURE — 74019 RADEX ABDOMEN 2 VIEWS: CPT | Mod: S$GLB,,, | Performed by: RADIOLOGY

## 2019-06-04 PROCEDURE — 3077F SYST BP >= 140 MM HG: CPT | Mod: CPTII,S$GLB,, | Performed by: NURSE PRACTITIONER

## 2019-06-04 PROCEDURE — 1101F PT FALLS ASSESS-DOCD LE1/YR: CPT | Mod: CPTII,S$GLB,, | Performed by: NURSE PRACTITIONER

## 2019-06-04 PROCEDURE — 92226 PR SPECIAL EYE EXAM, SUBSEQUENT: ICD-10-PCS | Mod: 50,HCNC,S$GLB, | Performed by: OPHTHALMOLOGY

## 2019-06-04 PROCEDURE — 81003 URINALYSIS AUTO W/O SCOPE: CPT | Mod: QW,S$GLB,, | Performed by: NURSE PRACTITIONER

## 2019-06-04 PROCEDURE — 3079F DIAST BP 80-89 MM HG: CPT | Mod: CPTII,S$GLB,, | Performed by: NURSE PRACTITIONER

## 2019-06-04 PROCEDURE — 3079F PR MOST RECENT DIASTOLIC BLOOD PRESSURE 80-89 MM HG: ICD-10-PCS | Mod: CPTII,S$GLB,, | Performed by: NURSE PRACTITIONER

## 2019-06-04 PROCEDURE — 99214 OFFICE O/P EST MOD 30 MIN: CPT | Mod: S$GLB,,, | Performed by: NURSE PRACTITIONER

## 2019-06-04 PROCEDURE — 99214 PR OFFICE/OUTPT VISIT, EST, LEVL IV, 30-39 MIN: ICD-10-PCS | Mod: S$GLB,,, | Performed by: NURSE PRACTITIONER

## 2019-06-04 PROCEDURE — 81003 POCT URINALYSIS, DIPSTICK, AUTOMATED, W/O SCOPE: ICD-10-PCS | Mod: QW,S$GLB,, | Performed by: NURSE PRACTITIONER

## 2019-06-04 RX ORDER — OLOPATADINE HYDROCHLORIDE 2 MG/ML
1 SOLUTION/ DROPS OPHTHALMIC DAILY
Qty: 2.5 ML | Refills: 12 | Status: SHIPPED | OUTPATIENT
Start: 2019-06-04 | End: 2019-06-07 | Stop reason: SDUPTHER

## 2019-06-04 NOTE — PATIENT INSTRUCTIONS
Alternate ice and heat to the area.  Positions of comfort.  Tylenol otc as directed as needed for pain.  Follow up closely with your PCP.  Go to the ER for ANY worsening symptoms including chest pain, shortness of breath, fever, vomiting.  Muscle Strain in the Abdomen  A muscle strain is a stretching or tearing of the muscle fibers. It is also called a pulled muscle. The abdomen is protected by a thick wall of muscle in the front and sides. These muscles help with twisting and bending forward. Too much coughing, lifting heavy objects, or sudden jerking movements can sometimes cause a muscle strain in the abdomen. This causes pain that is worse when you move. The area may also feel tender or look swollen and bruised.  Home care  · Apply an ice pack over the injured area for 15 to 20 minutes every 3 to 6 hours. You should do this for the first 24 to 48 hours. You can make an ice pack by filling a plastic bag that seals at the top with ice cubes and then wrapping it with a thin towel. Be careful not to injure your skin with the ice treatments. Ice should never be applied directly to skin. Continue the use of ice packs for relief of pain and swelling as needed. After 48 hours, apply heat (warm shower or warm bath) for 15 to 20 minutes several times a day, or alternate ice and heat.  · You may use over-the-counter pain medicine to control pain, unless another pain medicine was prescribed. If you have liver or kidney disease, a stomach ulcer or GI bleeding, talk with your healthcare provider before using these medicines.  Follow-up care  Follow up with your healthcare provider, or as advised.  Call 911  Call 911 if you have:  · Weakness, lightheaded, or faint  · Chest pain  When to seek medical advice  Call your healthcare provider right away if any of these occur:  · Pain gets worse or moves to the right lower abdomen, just below the waistline  · Fever of 100.4°F (38°C) or above lasting for 24 to 48  hours  · Vomiting  · Severe abdominal pain that spreads to the back or toward the groin  · Blood in the urine  · Unexpected vaginal bleeding in women  Date Last Reviewed: 11/19/2015  © 8425-5146 [employer]. [address] [place] [address], [place] [address]. All rights reserved. This information is not intended as a substitute for professional medical care. Always follow your healthcare professional's instructions.

## 2019-06-04 NOTE — LETTER
June 4, 2019      Asad Chiu, OD  [address]  [facility] [address]           [facility]  [address]  [place] [address]  Phone: [phone]  Fax: [phone]          Patient: Henrietta Villasenor   MR Number: [id number]   YOB: 1940   Date of Visit: 6/4/2019       Dear Dr. Asad Chiu:    Thank you for referring Henrietta Villasenor to me for evaluation. Attached you will find relevant portions of my assessment and plan of care.    If you have questions, please do not hesitate to call me. I look forward to following Henrietta Villasenor along with you.    Sincerely,    SRI Chan MD    Enclosure  CC:  No Recipients    If you would like to receive this communication electronically, please contact [email or web address] or [phone] to request more information on [employer] access.    For providers and/or their staff who would like to refer a patient to [facility], please contact us through our one-stop-shop provider referral line, [facility], at [phone].    If you feel you have received this communication in error or would no longer like to receive these types of communications, please e-mail [email or web address]

## 2019-06-04 NOTE — PROGRESS NOTES
HPI     Referred:      78 y/o female is here for ERM OD. Pt states Va is blurry. Pt c/o of floaters and flashes. BSL was 120 today.     Eyemeds  Drop name unknown    Last edited by Louann Hernández on 6/4/2019  2:01 PM. (History)      OCT - OD VMT with progression  OS - small ERM      A/P    1. VMT OD  Increasing traction, but ASx  Monitor for now    2. Small ERM OS  ASx    3. PCIOL OU    4. KCS  AT's      10 weeks OCT

## 2019-06-04 NOTE — PROGRESS NOTES
"Subjective:       Patient ID: Henrietta Villasenor is a 79 y.o. female.    Vitals:  height is 5' 8" (1.727 m) and weight is 100.7 kg (222 lb). Her temperature is 98.5 °F (36.9 °C). Her blood pressure is 162/84 (abnormal) and her pulse is 77. Her respiration is 16 and oxygen saturation is 98%.     Chief Complaint: Pain    Patient presents with complaint of left neck stiffness and soreness about a week and a half ago that resolved after 3-4 days.  She states that she felt like the muscles were stiff in the area and they started to get better on their own.  And then it moved down into her left breast and upper back and this also resolved when she started wearing sports bras because she felt under wire was too restricting..  She now has been having 4 days of a straining sensation in the left side of her lower abdomen.  She is repeatedly pointing to her lower pannus area.  She states that she does get some radiation of pain to the left lower back.  Patient states that at the time that this all started when she was coughing a lot.  Her doctor called her in some cough medication.  She states that now she only gets a cough at night.  She does have a codeine based cough syrup that she takes with some relief.  States that the cough comes on and that is gone all of a sudden.  Denies any fever or chills.  Denies any chest pain or shortness of breath.  Denies any indigestion or belching.  Denies any nausea vomiting diarrhea.  She has had no change in appetite.  She was able to eat oatmeal today with no problems.  Denies any dysuria, urgency, frequency, hematuria.  Denies fever or chills.  States that the pain occurs when she has to get up to walk or when she moves certain positions.      Abdominal Pain   This is a recurrent problem. The current episode started in the past 7 days. The onset quality is gradual. The problem occurs daily. The problem has been unchanged. The pain is located in the LLQ, LUQ and left flank. The pain is at a " severity of 5/10. The pain is moderate. The quality of the pain is cramping. The abdominal pain radiates to the back. Pertinent negatives include no anorexia, arthralgias, belching, constipation, diarrhea, dysuria, fever, flatus, frequency, headaches, hematochezia, hematuria, melena, myalgias, nausea, vomiting or weight loss. The pain is aggravated by movement, certain positions, palpation and coughing. The pain is relieved by nothing. She has tried nothing for the symptoms.       Constitution: Negative for chills, fatigue and fever.   HENT: Negative for congestion and sore throat.    Neck: Neck pain and stiffness have resolvedPositive for neck pain and neck stiffness. Negative for painful lymph nodes.   Cardiovascular: Negative for chest trauma, chest pain, leg swelling, palpitations, sob on exertion and passing out.   Eyes: Negative for double vision and blurred vision.   Respiratory: Negative for chest tightness, cough, sputum production, shortness of breath and wheezing.    Gastrointestinal: Positive for abdominal pain. Negative for abdominal trauma, abdominal bloating, nausea, vomiting, constipation, diarrhea, bright red blood in stool, dark colored stools, rectal bleeding, rectal pain and heartburn.   Genitourinary: Negative for dysuria, frequency, urgency, hematuria, history of kidney stones and pelvic pain.   Musculoskeletal: Positive for pain. Negative for joint pain, joint swelling, muscle cramps and muscle ache.   Skin: Negative for color change, pale, rash and bruising.   Allergic/Immunologic: Negative for seasonal allergies.   Neurological: Negative for dizziness, history of vertigo, light-headedness, passing out and headaches.   Hematologic/Lymphatic: Negative for swollen lymph nodes.   Psychiatric/Behavioral: Negative for nervous/anxious, sleep disturbance and depression. The patient is not nervous/anxious.        Objective:      Physical Exam   Constitutional: She is oriented to person, place, and  time. She appears well-developed and well-nourished.  Non-toxic appearance. She does not have a sickly appearance. She does not appear ill. No distress.   HENT:   Head: Normocephalic and atraumatic.   Right Ear: External ear normal.   Left Ear: External ear normal.   Nose: Nose normal.   Mouth/Throat: Mucous membranes are normal.   Eyes: Conjunctivae and lids are normal.   Neck: Trachea normal, normal range of motion and full passive range of motion without pain. Neck supple. No spinous process tenderness and no muscular tenderness present. Normal range of motion present.   Cardiovascular: Normal rate, regular rhythm, normal heart sounds and intact distal pulses.   Pulmonary/Chest: Effort normal and breath sounds normal. No respiratory distress. She has no wheezes. Chest wall is not dull to percussion. She exhibits tenderness. She exhibits no mass, no bony tenderness, no laceration, no crepitus, no edema, no deformity, no swelling and no retraction.   TTP to left breast region with no erythema, warmth, rash, or swelling   Abdominal: Soft. Normal appearance and bowel sounds are normal. She exhibits no distension, no abdominal bruit, no pulsatile midline mass and no mass. There is tenderness in the left upper quadrant. There is no rigidity, no rebound, no guarding and no CVA tenderness.       TTP to left upper abdomen and left chest wall/breast   Musculoskeletal: Normal range of motion. She exhibits no edema.   Neurological: She is alert and oriented to person, place, and time. She has normal strength.   Skin: Skin is warm, dry and intact. No rash noted. She is not diaphoretic. No pallor.   Psychiatric: She has a normal mood and affect. Her speech is normal and behavior is normal. Judgment and thought content normal. Cognition and memory are normal.   Nursing note and vitals reviewed.      Results for orders placed or performed in visit on 06/04/19   POCT Urinalysis, Dipstick, Automated, W/O Scope   Result Value Ref  Range    POC Blood, Urine Negative Negative    POC Bilirubin, Urine Negative Negative    POC Urobilinogen, Urine Normal 0.1 - 1.1    POC Ketones, Urine Negative Negative    POC Protein, Urine Negative Negative    POC Nitrates, Urine Negative Negative    POC Glucose, Urine Negative Negative    pH, UA 6.0 5 - 8    POC Specific Gravity, Urine 1.025 1.003 - 1.029    POC Leukocytes, Urine Positive (A) Negative     *Note: Due to a large number of results and/or encounters for the requested time period, some results have not been displayed. A complete set of results can be found in Results Review.     X-ray Abdomen Flat And Erect    Result Date: 6/4/2019  EXAMINATION: XR ABDOMEN FLAT AND ERECT CLINICAL HISTORY: Unspecified abdominal pain TECHNIQUE: Flat and erect AP views of the abdomen were performed. COMPARISON: None FINDINGS: Upright and supine views of 3 images presented.  Bowel gas pattern appears normal.  No pneumatosis or free air or fracture or hernia seen.  The right upper quadrant cholecystectomy clips.  There are 4 faceted approximately 5-7 mm calcifications projecting at the expected location of the right renal pelvis consistent with nephrolithiasis.  Visualized lung bases show mild right platelike atelectasis.     Apparent right nephrolithiasis.  Normal bowel gas pattern. Electronically signed by: Terence Awad Date:    06/04/2019 Time:    16:59      Assessment:       1. Strain of abdominal wall, initial encounter    2. Abdominal pain, unspecified abdominal location        Plan:         Strain of abdominal wall, initial encounter    Abdominal pain, unspecified abdominal location  -     X-Ray Abdomen Flat And Erect; Future; Expected date: 06/04/2019  -     POCT Urinalysis, Dipstick, Automated, W/O Scope  -     Urine culture      Patient Instructions   Alternate ice and heat to the area.  Positions of comfort.  Tylenol otc as directed as needed for pain.  Follow up closely with your PCP.  Go to the ER for ANY  worsening symptoms including chest pain, shortness of breath, fever, vomiting.  Muscle Strain in the Abdomen  A muscle strain is a stretching or tearing of the muscle fibers. It is also called a pulled muscle. The abdomen is protected by a thick wall of muscle in the front and sides. These muscles help with twisting and bending forward. Too much coughing, lifting heavy objects, or sudden jerking movements can sometimes cause a muscle strain in the abdomen. This causes pain that is worse when you move. The area may also feel tender or look swollen and bruised.  Home care  · Apply an ice pack over the injured area for 15 to 20 minutes every 3 to 6 hours. You should do this for the first 24 to 48 hours. You can make an ice pack by filling a plastic bag that seals at the top with ice cubes and then wrapping it with a thin towel. Be careful not to injure your skin with the ice treatments. Ice should never be applied directly to skin. Continue the use of ice packs for relief of pain and swelling as needed. After 48 hours, apply heat (warm shower or warm bath) for 15 to 20 minutes several times a day, or alternate ice and heat.  · You may use over-the-counter pain medicine to control pain, unless another pain medicine was prescribed. If you have liver or kidney disease, a stomach ulcer or GI bleeding, talk with your healthcare provider before using these medicines.  Follow-up care  Follow up with your healthcare provider, or as advised.  Call 911  Call 911 if you have:  · Weakness, lightheaded, or faint  · Chest pain  When to seek medical advice  Call your healthcare provider right away if any of these occur:  · Pain gets worse or moves to the right lower abdomen, just below the waistline  · Fever of 100.4°F (38°C) or above lasting for 24 to 48 hours  · Vomiting  · Severe abdominal pain that spreads to the back or toward the groin  · Blood in the urine  · Unexpected vaginal bleeding in women  Date Last Reviewed:  11/19/2015  © 8392-8773 [employer]. [address] [place] [address], [place] [address]. All rights reserved. This information is not intended as a substitute for professional medical care. Always follow your healthcare professional's instructions.

## 2019-06-07 RX ORDER — OLOPATADINE HYDROCHLORIDE 2 MG/ML
1 SOLUTION/ DROPS OPHTHALMIC DAILY
Qty: 2.5 ML | Refills: 12 | Status: ON HOLD | OUTPATIENT
Start: 2019-06-07 | End: 2023-04-26

## 2019-06-10 ENCOUNTER — LAB VISIT (OUTPATIENT)
Dept: LAB | Facility: HOSPITAL | Age: 79
End: 2019-06-10
Attending: INTERNAL MEDICINE
Payer: MEDICARE

## 2019-06-10 ENCOUNTER — OFFICE VISIT (OUTPATIENT)
Dept: INTERNAL MEDICINE | Facility: CLINIC | Age: 79
End: 2019-06-10
Payer: MEDICARE

## 2019-06-10 ENCOUNTER — TELEPHONE (OUTPATIENT)
Dept: INTERNAL MEDICINE | Facility: CLINIC | Age: 79
End: 2019-06-10

## 2019-06-10 VITALS
HEIGHT: 68 IN | OXYGEN SATURATION: 96 % | WEIGHT: 216 LBS | HEART RATE: 73 BPM | BODY MASS INDEX: 32.74 KG/M2 | TEMPERATURE: 98 F | SYSTOLIC BLOOD PRESSURE: 120 MMHG | DIASTOLIC BLOOD PRESSURE: 70 MMHG

## 2019-06-10 DIAGNOSIS — M46.06 SPINAL ENTHESOPATHY OF LUMBAR REGION: ICD-10-CM

## 2019-06-10 DIAGNOSIS — I50.30 DIASTOLIC CONGESTIVE HEART FAILURE, UNSPECIFIED HF CHRONICITY: ICD-10-CM

## 2019-06-10 DIAGNOSIS — E13.9 DIABETES MELLITUS DUE TO ABNORMAL INSULIN: ICD-10-CM

## 2019-06-10 DIAGNOSIS — K21.9 GASTROESOPHAGEAL REFLUX DISEASE WITHOUT ESOPHAGITIS: ICD-10-CM

## 2019-06-10 DIAGNOSIS — F39 MOOD DISORDER: ICD-10-CM

## 2019-06-10 DIAGNOSIS — K57.92 DIVERTICULITIS: ICD-10-CM

## 2019-06-10 DIAGNOSIS — M10.9 GOUT, ARTHRITIS: ICD-10-CM

## 2019-06-10 DIAGNOSIS — K57.92 DIVERTICULITIS: Primary | ICD-10-CM

## 2019-06-10 LAB
ALBUMIN SERPL BCP-MCNC: 3.2 G/DL (ref 3.5–5.2)
ALP SERPL-CCNC: 87 U/L (ref 55–135)
ALT SERPL W/O P-5'-P-CCNC: 28 U/L (ref 10–44)
AMYLASE SERPL-CCNC: 41 U/L (ref 20–110)
ANION GAP SERPL CALC-SCNC: 13 MMOL/L (ref 8–16)
AST SERPL-CCNC: 41 U/L (ref 10–40)
BASOPHILS # BLD AUTO: 0.04 K/UL (ref 0–0.2)
BASOPHILS NFR BLD: 0.4 % (ref 0–1.9)
BILIRUB SERPL-MCNC: 0.5 MG/DL (ref 0.1–1)
BUN SERPL-MCNC: 18 MG/DL (ref 8–23)
CALCIUM SERPL-MCNC: 10 MG/DL (ref 8.7–10.5)
CHLORIDE SERPL-SCNC: 99 MMOL/L (ref 95–110)
CO2 SERPL-SCNC: 28 MMOL/L (ref 23–29)
CREAT SERPL-MCNC: 1.5 MG/DL (ref 0.5–1.4)
CRP SERPL-MCNC: 56.9 MG/L (ref 0–8.2)
DIFFERENTIAL METHOD: ABNORMAL
EOSINOPHIL # BLD AUTO: 0.5 K/UL (ref 0–0.5)
EOSINOPHIL NFR BLD: 4.4 % (ref 0–8)
ERYTHROCYTE [DISTWIDTH] IN BLOOD BY AUTOMATED COUNT: 13.9 % (ref 11.5–14.5)
EST. GFR  (AFRICAN AMERICAN): 38 ML/MIN/1.73 M^2
EST. GFR  (NON AFRICAN AMERICAN): 33 ML/MIN/1.73 M^2
ESTIMATED AVG GLUCOSE: 180 MG/DL (ref 68–131)
GLUCOSE SERPL-MCNC: 99 MG/DL (ref 70–110)
HBA1C MFR BLD HPLC: 7.9 % (ref 4–5.6)
HCT VFR BLD AUTO: 37.3 % (ref 37–48.5)
HGB BLD-MCNC: 11.6 G/DL (ref 12–16)
LIPASE SERPL-CCNC: 19 U/L (ref 4–60)
LYMPHOCYTES # BLD AUTO: 2.2 K/UL (ref 1–4.8)
LYMPHOCYTES NFR BLD: 19.6 % (ref 18–48)
MCH RBC QN AUTO: 27.7 PG (ref 27–31)
MCHC RBC AUTO-ENTMCNC: 31.1 G/DL (ref 32–36)
MCV RBC AUTO: 89 FL (ref 82–98)
MONOCYTES # BLD AUTO: 1 K/UL (ref 0.3–1)
MONOCYTES NFR BLD: 8.8 % (ref 4–15)
NEUTROPHILS # BLD AUTO: 7.3 K/UL (ref 1.8–7.7)
NEUTROPHILS NFR BLD: 66.3 % (ref 38–73)
PLATELET # BLD AUTO: 282 K/UL (ref 150–350)
PMV BLD AUTO: 9.6 FL (ref 9.2–12.9)
POTASSIUM SERPL-SCNC: 3.9 MMOL/L (ref 3.5–5.1)
PROT SERPL-MCNC: 8.1 G/DL (ref 6–8.4)
RBC # BLD AUTO: 4.19 M/UL (ref 4–5.4)
SODIUM SERPL-SCNC: 140 MMOL/L (ref 136–145)
WBC # BLD AUTO: 10.95 K/UL (ref 3.9–12.7)

## 2019-06-10 PROCEDURE — 1101F PT FALLS ASSESS-DOCD LE1/YR: CPT | Mod: HCNC,CPTII,S$GLB, | Performed by: INTERNAL MEDICINE

## 2019-06-10 PROCEDURE — 3078F DIAST BP <80 MM HG: CPT | Mod: HCNC,CPTII,S$GLB, | Performed by: INTERNAL MEDICINE

## 2019-06-10 PROCEDURE — 3078F PR MOST RECENT DIASTOLIC BLOOD PRESSURE < 80 MM HG: ICD-10-PCS | Mod: HCNC,CPTII,S$GLB, | Performed by: INTERNAL MEDICINE

## 2019-06-10 PROCEDURE — 3074F SYST BP LT 130 MM HG: CPT | Mod: HCNC,CPTII,S$GLB, | Performed by: INTERNAL MEDICINE

## 2019-06-10 PROCEDURE — 99214 OFFICE O/P EST MOD 30 MIN: CPT | Mod: HCNC,25,S$GLB, | Performed by: INTERNAL MEDICINE

## 2019-06-10 PROCEDURE — 83690 ASSAY OF LIPASE: CPT | Mod: HCNC

## 2019-06-10 PROCEDURE — 96372 THER/PROPH/DIAG INJ SC/IM: CPT | Mod: S$GLB,,, | Performed by: INTERNAL MEDICINE

## 2019-06-10 PROCEDURE — 3074F PR MOST RECENT SYSTOLIC BLOOD PRESSURE < 130 MM HG: ICD-10-PCS | Mod: HCNC,CPTII,S$GLB, | Performed by: INTERNAL MEDICINE

## 2019-06-10 PROCEDURE — 96372 PR INJECTION,THERAP/PROPH/DIAG2ST, IM OR SUBCUT: ICD-10-PCS | Mod: S$GLB,,, | Performed by: INTERNAL MEDICINE

## 2019-06-10 PROCEDURE — 82150 ASSAY OF AMYLASE: CPT | Mod: HCNC

## 2019-06-10 PROCEDURE — 1101F PR PT FALLS ASSESS DOC 0-1 FALLS W/OUT INJ PAST YR: ICD-10-PCS | Mod: HCNC,CPTII,S$GLB, | Performed by: INTERNAL MEDICINE

## 2019-06-10 PROCEDURE — 80053 COMPREHEN METABOLIC PANEL: CPT | Mod: HCNC

## 2019-06-10 PROCEDURE — 83036 HEMOGLOBIN GLYCOSYLATED A1C: CPT | Mod: HCNC

## 2019-06-10 PROCEDURE — 99999 PR PBB SHADOW E&M-EST. PATIENT-LVL III: CPT | Mod: PBBFAC,HCNC,, | Performed by: INTERNAL MEDICINE

## 2019-06-10 PROCEDURE — 99214 PR OFFICE/OUTPT VISIT, EST, LEVL IV, 30-39 MIN: ICD-10-PCS | Mod: HCNC,25,S$GLB, | Performed by: INTERNAL MEDICINE

## 2019-06-10 PROCEDURE — 86140 C-REACTIVE PROTEIN: CPT | Mod: HCNC

## 2019-06-10 PROCEDURE — 36415 COLL VENOUS BLD VENIPUNCTURE: CPT | Mod: HCNC

## 2019-06-10 PROCEDURE — 85025 COMPLETE CBC W/AUTO DIFF WBC: CPT | Mod: HCNC

## 2019-06-10 PROCEDURE — 99999 PR PBB SHADOW E&M-EST. PATIENT-LVL III: ICD-10-PCS | Mod: PBBFAC,HCNC,, | Performed by: INTERNAL MEDICINE

## 2019-06-10 RX ORDER — OXYCODONE AND ACETAMINOPHEN 10; 325 MG/1; MG/1
TABLET ORAL
Qty: 150 TABLET | Refills: 0 | Status: SHIPPED | OUTPATIENT
Start: 2019-06-10 | End: 2019-07-08 | Stop reason: SDUPTHER

## 2019-06-10 RX ORDER — CEFTRIAXONE 1 G/1
1 INJECTION, POWDER, FOR SOLUTION INTRAMUSCULAR; INTRAVENOUS ONCE
Status: COMPLETED | OUTPATIENT
Start: 2019-06-10 | End: 2019-06-10

## 2019-06-10 RX ORDER — AMOXICILLIN AND CLAVULANATE POTASSIUM 875; 125 MG/1; MG/1
1 TABLET, FILM COATED ORAL 2 TIMES DAILY
Qty: 20 TABLET | Refills: 0 | Status: SHIPPED | OUTPATIENT
Start: 2019-06-10 | End: 2019-06-20

## 2019-06-10 RX ADMIN — CEFTRIAXONE 1 G: 1 INJECTION, POWDER, FOR SOLUTION INTRAMUSCULAR; INTRAVENOUS at 09:06

## 2019-06-10 NOTE — TELEPHONE ENCOUNTER
Used 2 pt identifiers and reviewed allergies prior to giving 1Gm CEFTRIAXONE injection in R/ Vg, then asked pt to wait 15 mins post injection for s/sx of adverse reactions.

## 2019-06-10 NOTE — PROGRESS NOTES
CHIEF COMPLAINT: multiple issues     HISTORY OF PRESENT ILLNESS: This is a 79-year-old woman who presents with her daughter, April from [facility] due to above.    Around June 2nd, she sore throat and cough.  No fever, chills.  She had green nasal drainage that has improved. She is using astelazine nasal spray. No mucous production now.  Several days later she started to have left lower quadrant abdominal pain. No nausea, vomiting.  Pain has gotten a little better - pain is 6/10.  No constipation.  She had some watery stool that has improved.  She now has a formed stool now. No melana or bloody stools. No dysuria or hematuria. She was seen in urgent care on 6/4/19. No chest pain or shortness of breath.      Her left ankle is better. She is taking allopurinol 100 mg daily for gout. She completed prednisone.      She is now taking duloxetine 60 mg twice daily. Mood is still not good.      She takes tizanidine 4 mg at bedtime to help with muscle spasm of her back. She continues to have back pain. She is taking Oxycodone apap 10/325 3-5 times daily which helps her back. She had to stop physcial therapy due to her foot and ankle pain.     She had an injection in the right knee 1/24/19. She will eventually need a knee replacement. The injection helped her right knee. She had a left knee replacement 2012. Right knee is doing well.      She is taking miralax 1 capful daily as needed      She is taking Pantoprazole 40 mg twice daily, carvedilol 25 mg twice daily, Vitamin D3 400 units daily, Loratadine 10 mg daily as needed, Aspirin 81 mg daily, Furosemide 40 mg twice daily, potassium 10 meq twice daily.         She is taking Lantus 60 units in the evening and Novolog 23 units once or twice daily with meals.  No hypoglycemia.  Averages 130-140. No hypoglycemia     Heartburn is controlled. She is taking pantoprazole 40 mg twice daily .     Grandson still is living with her. He got his CDL license and wants to drive  trucks.       Vitamin D 400 units daily     PAST MEDICAL HISTORY:   1. Diabetes mellitus   2. Hypertension   3. Hyperlipidemia   4. Left heart catheterization January 2007 which revealed luminal irregularities in the LAD, left circumflex and right coronary artery. She had diastolic dysfunction and patent renal arteries.   5. Sleep apnea   6. Obesity.   7. Nephrolithiasis status post lithotripsy.   8. Reflux - had nonerosive gastropathy on EGD September 2007. Gastritis on EGD 9/2010  9. Hidradenitis suppurativa.   10. History of diverticulitis with a hospitalized October 2007.   11. Migraines   12. Obesity.   13. 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    14. Total hysterectomy in 1969.   15. Cholecystectomy was done at that time as well.   16. 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   17. Colon polyps on colonscopy 11/2010 - due 2013   18. Hospitalization 12/10 for e coli sepsis due to left ureteral stone with left nephrostomy tube in [place], [place]   19. Left knee replacement 9/2012      MEDICATIONS and ALLERGIES: Updated on EPIC.     PHYSICAL EXAMINATION:      GENERAL: She is alert, oriented, no apparent distress. Affect within   normal limits.  Conjunctiva anicteric. . Oropharynx clear.   NECK: Supple.   Respiratory: Effort normal. Lungs clear  HEART: Regular rate and rhythm without murmurs, gallops or rubs.   No lower extremity edema.    ABDOMEN: soft, non distended, bowel sounds present, no hepatosplenomgaly. Left lower quadrant tenderness. No rebound or guarding  .           ASSESSMENT AND PLAN:   1. Abdominal pain - suspect diverticulitis as she has had it in the past. Rocephin 1 gram IM today. Augmentin 875 mg twice daily for 10 days  2. Gout - on allopurinol 100 mg daily.   2.  Mood disorder -  duloxetine to 60 mg twice daily  3. neck, shoulder and back pain -follow up with [facility] and spine center  4. Asthma - stable   6. HTN  -stable  7. Gerd - take pantoprazole 40 mg twice daily  8. Diabetes with neuropathy- watch sugars  9. Hyperlipidemia - off lipitor  10. Allergic rhinitis - stable  11. Diastolic congestive heart failure - stable.    12. CRI -stable  13. Obesity - discussed diet, exercise and weight loss  14. CAD -risk factor modification  15. Aortic atherosclerosis and possible mesenteric artery stenosis- risk factor modification  16. Tortuous aorta - HTN controlled  17. Colon polyps - Colonoscopy in 8/2013 - 2 polyps. Flex sig 11/2013 - mild granular mucosa.  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 .  Recommended colonoscopy in 8/2018. Attempted colonoscopy 11/2016 du to diarrhea which has resolved. She had poor prep with hyperplastic rectal biopsy.   MMG 5/19  Prevnar 10/15 and flu shot 11/16  I will see her back in 4 weeks sooner if issues. .

## 2019-06-24 ENCOUNTER — OFFICE VISIT (OUTPATIENT)
Dept: INTERNAL MEDICINE | Facility: CLINIC | Age: 79
End: 2019-06-24
Payer: MEDICARE

## 2019-06-24 VITALS
BODY MASS INDEX: 32.38 KG/M2 | WEIGHT: 213.63 LBS | HEART RATE: 73 BPM | HEIGHT: 68 IN | TEMPERATURE: 97 F | SYSTOLIC BLOOD PRESSURE: 136 MMHG | OXYGEN SATURATION: 97 % | DIASTOLIC BLOOD PRESSURE: 80 MMHG

## 2019-06-24 DIAGNOSIS — I10 ESSENTIAL HYPERTENSION: Chronic | ICD-10-CM

## 2019-06-24 DIAGNOSIS — K21.9 GASTROESOPHAGEAL REFLUX DISEASE WITHOUT ESOPHAGITIS: ICD-10-CM

## 2019-06-24 DIAGNOSIS — E78.5 HYPERLIPIDEMIA, UNSPECIFIED HYPERLIPIDEMIA TYPE: ICD-10-CM

## 2019-06-24 DIAGNOSIS — I50.30 DIASTOLIC CONGESTIVE HEART FAILURE, UNSPECIFIED HF CHRONICITY: Primary | ICD-10-CM

## 2019-06-24 DIAGNOSIS — M10.9 GOUT, ARTHRITIS: ICD-10-CM

## 2019-06-24 DIAGNOSIS — I25.119 CORONARY ARTERY DISEASE INVOLVING NATIVE CORONARY ARTERY OF NATIVE HEART WITH ANGINA PECTORIS: ICD-10-CM

## 2019-06-24 DIAGNOSIS — K57.92 DIVERTICULITIS: ICD-10-CM

## 2019-06-24 PROCEDURE — 3075F SYST BP GE 130 - 139MM HG: CPT | Mod: HCNC,CPTII,S$GLB, | Performed by: INTERNAL MEDICINE

## 2019-06-24 PROCEDURE — 99214 PR OFFICE/OUTPT VISIT, EST, LEVL IV, 30-39 MIN: ICD-10-PCS | Mod: HCNC,S$GLB,, | Performed by: INTERNAL MEDICINE

## 2019-06-24 PROCEDURE — 1101F PR PT FALLS ASSESS DOC 0-1 FALLS W/OUT INJ PAST YR: ICD-10-PCS | Mod: HCNC,CPTII,S$GLB, | Performed by: INTERNAL MEDICINE

## 2019-06-24 PROCEDURE — 1101F PT FALLS ASSESS-DOCD LE1/YR: CPT | Mod: HCNC,CPTII,S$GLB, | Performed by: INTERNAL MEDICINE

## 2019-06-24 PROCEDURE — 99499 UNLISTED E&M SERVICE: CPT | Mod: HCNC,S$GLB,, | Performed by: INTERNAL MEDICINE

## 2019-06-24 PROCEDURE — 3079F PR MOST RECENT DIASTOLIC BLOOD PRESSURE 80-89 MM HG: ICD-10-PCS | Mod: HCNC,CPTII,S$GLB, | Performed by: INTERNAL MEDICINE

## 2019-06-24 PROCEDURE — 99999 PR PBB SHADOW E&M-EST. PATIENT-LVL III: ICD-10-PCS | Mod: PBBFAC,HCNC,, | Performed by: INTERNAL MEDICINE

## 2019-06-24 PROCEDURE — 99499 RISK ADDL DX/OHS AUDIT: ICD-10-PCS | Mod: HCNC,S$GLB,, | Performed by: INTERNAL MEDICINE

## 2019-06-24 PROCEDURE — 99999 PR PBB SHADOW E&M-EST. PATIENT-LVL III: CPT | Mod: PBBFAC,HCNC,, | Performed by: INTERNAL MEDICINE

## 2019-06-24 PROCEDURE — 3079F DIAST BP 80-89 MM HG: CPT | Mod: HCNC,CPTII,S$GLB, | Performed by: INTERNAL MEDICINE

## 2019-06-24 PROCEDURE — 3075F PR MOST RECENT SYSTOLIC BLOOD PRESS GE 130-139MM HG: ICD-10-PCS | Mod: HCNC,CPTII,S$GLB, | Performed by: INTERNAL MEDICINE

## 2019-06-24 PROCEDURE — 99214 OFFICE O/P EST MOD 30 MIN: CPT | Mod: HCNC,S$GLB,, | Performed by: INTERNAL MEDICINE

## 2019-06-24 NOTE — PROGRESS NOTES
CHIEF COMPLAINT: multiple issues     HISTORY OF PRESENT ILLNESS: This is a 79-year-old woman who presents with her daughter, April from [facility] due to above.     Abdominal pain is better. She just completed a course of Augmentin today.  No vomiting, constipation, diarrhea.  Occasional soft or watery stools.  No melana or bloody stool, fever, chills. Cough is better.       Her left ankle is better. She is taking allopurinol 100 mg daily for gout.  She had some right shoulder pain that lasted several days and has resolved.      She is now taking duloxetine 60 mg twice daily. Mood is still not good.      She takes tizanidine 4 mg at bedtime to help with muscle spasm of her back. She continues to have back pain. She is taking Oxycodone apap 10/325 3-5 times daily which helps her back. She had to stop physcial therapy due to her foot and ankle pain.     She had an injection in the right knee 1/24/19. She will eventually need a knee replacement. The injection helped her right knee. She had a left knee replacement 2012. Right knee is doing well.      She is taking miralax 1 capful daily as needed ( has not needed)      She is taking Pantoprazole 40 mg twice daily, carvedilol 25 mg twice daily, Vitamin D3 400 units daily, Loratadine 10 mg daily as needed, Aspirin 81 mg daily, Furosemide 40 mg twice daily, potassium 10 meq twice daily.          She is taking Lantus 60 units in the evening and Novolog 23 units once or twice daily with meals.  No hypoglycemia.  Averages 130-140. No hypoglycemia     Heartburn is controlled. She is taking pantoprazole 40 mg twice daily .        PAST MEDICAL HISTORY:   1. Diabetes mellitus   2. Hypertension   3. Hyperlipidemia   4. Left heart catheterization January 2007 which revealed luminal irregularities in the LAD, left circumflex and right coronary artery. She had diastolic dysfunction and patent renal arteries.   5. Sleep apnea   6. Obesity.   7. Nephrolithiasis status post  "lithotripsy.   8. Reflux - had nonerosive gastropathy on EGD September 2007. Gastritis on EGD 9/2010  9. Hidradenitis suppurativa.   10. History of diverticulitis with a hospitalized October 2007.   11. Migraines   12. Obesity.   13. 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    14. Total hysterectomy in 1969.   15. Cholecystectomy was done at that time as well.   16. 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   17. Colon polyps on colonscopy 11/2010 - due 2013   18. Hospitalization 12/10 for e coli sepsis due to left ureteral stone with left nephrostomy tube in [place], [place]   19. Left knee replacement 9/2012      MEDICATIONS and ALLERGIES: Updated on EPIC.     PHYSICAL EXAMINATION:    /80 (BP Location: Right arm, Patient Position: Sitting, BP Method: Large (Manual))   Pulse 73   Temp 97.4 °F (36.3 °C) (Oral)   Ht 5' 8" (1.727 m)   Wt 96.9 kg (213 lb 10 oz)   LMP  (LMP Unknown)   SpO2 97%   BMI 32.48 kg/m²   GENERAL: She is alert, oriented, no apparent distress. Affect within   normal limits.  Conjunctiva anicteric. . Oropharynx clear.   NECK: Supple.   Respiratory: Effort normal. Lungs clear  HEART: Regular rate and rhythm without murmurs, gallops or rubs.   No lower extremity edema.    ABDOMEN: soft, non distended, bowel sounds present, no hepatosplenomgaly. Left lower quadrant tenderness. No rebound or guarding  .            ASSESSMENT AND PLAN:   1. Abdominal pain - due to diverticulitis better after completing augmentin.   2. Gout - on allopurinol 100 mg daily.   2.  Mood disorder -  duloxetine to 60 mg twice daily  3. neck, shoulder and back pain -follow up with back and spine center  4. Asthma - stable   6. HTN -stable  7. Gerd - pantoprazole 40 mg twice daily  8. Diabetes with neuropathy- watch sugars  9. Hyperlipidemia - off lipitor  10. Allergic rhinitis - stable  11. Diastolic congestive heart failure - decrease fluid " pill to once daily unless swelling. .    12. CRI -stable  13. Obesity - discussed diet, exercise and weight loss  14. CAD -risk factor modification  15. Aortic atherosclerosis and possible mesenteric artery stenosis- risk factor modification  16. Tortuous aorta - HTN controlled  17. Colon polyps - Colonoscopy in 8/2013 - 2 polyps. Flex sig 11/2013 - mild granular mucosa.  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 .  Recommended colonoscopy in 8/2018. Attempted colonoscopy 11/2016 du to diarrhea which has resolved. She had poor prep with hyperplastic rectal biopsy.   MMG 5/19  Prevnar 10/15 and flu shot 11/16  I will see her back in 4 weeks sooner if issues. .

## 2019-07-08 RX ORDER — OXYCODONE AND ACETAMINOPHEN 10; 325 MG/1; MG/1
TABLET ORAL
Qty: 150 TABLET | Refills: 0 | Status: SHIPPED | OUTPATIENT
Start: 2019-07-08 | End: 2019-08-07 | Stop reason: SDUPTHER

## 2019-07-22 NOTE — PROGRESS NOTES
Subjective:      Patient ID: Henrietta Villasenor is a 79 y.o. female.    Chief Complaint: Low-back Pain    Ms Villasenor is a 78 yo female here for follow up of her CTS and her low back pain.  She was last seen by me on 4/2/2019 and [facility]  She feels like she has arthritis on the right side.  She had swelling on the right leg yesterday.  She feels like the whole right side of her leg swollen. She feels like she cannot stand on the right leg due to swelling.  She feels like it starts at the ankle and goes up to the leg.  She has an achy pain.  She is not able to say where it hurts the most.  She feels like she is not able to go to PT due to leg swelling.  She feels like the knee injection in January helped.      X-ray knee 1/2019  Postsurgical changes noted in the left knee consistent with total knee arthroplasty.  Hardware appears intact.  No evidence of fracture or loosening seen.  No fracture or dislocation is identified.  Advanced degenerative changes are noted in the right knee, most pronounced in the medial tibiofemoral compartment, with joint space narrowing, sclerosis, and osteophyte formation present.  Small joint effusion noted.  Atherosclerotic calcification noted in both knees.    Impression      Advanced degenerative changes in the right knee, most pronounced in the medial tibiofemoral compartment.      MRI lumbar 6/2018  Alignment: Normal.    Vertebrae: Normal marrow signal. No fracture.    Discs: Normal height and signal.    Cord: Normal.  Conus terminates at superior border of L1    Degenerative findings:    T12-L1: Mild broad-based disc bulge without compressive sequelae.    L1-L2: Unremarkable.    L2-L3: Mild broad-based disc bulge with facet arthropathy.  No compressive sequelae.    L3-L4: Broad-based disc bulge and facet arthropathy.  There is mild bilateral neural foraminal narrowing.    L4-L5: Trace anterolisthesis of L4 on L5 with broad-based disc bulge and facet  arthropathy.  This results in moderate bilateral neural foraminal narrowing.  Moderate canal stenosis.  There is also significant narrowing of the left greater than right lateral recesses.    L5-S1: Broad-based disc bulge without significant compressive sequelae.    Paraspinal muscles & soft tissues: Paraspinal atrophy.  No concerning findings in the visualized abdomen.  Add    Impression      Moderate degenerative change at L4-5.  Milder degenerative changes elsewhere in the lumbar spine.    X-ray lumbar 6/2018  The lumbar vertebra are intact.  No compression fracture is identified.  Degenerative changes are again seen with small osteophytes at multiple levels.  L4-5 disc space is mildly narrowed.  Degenerative changes are also noted at lower facet joints.  There is associated grade 1 spondylolisthesis at L4-5 which is stable between flexion and extension at about 6 mm.  Alignment is otherwise satisfactory.    Visualized abdomen shows aortic atherosclerosis and surgical clips in the right upper quadrant.    X-ray knees bilateral  Postsurgical changes noted in the left knee consistent with total knee arthroplasty.  Hardware appears intact.  No evidence of fracture or loosening seen.  No fracture or dislocation is identified.  Advanced degenerative changes are noted in the right knee, most pronounced in the medial tibiofemoral compartment, with joint space narrowing, sclerosis, and osteophyte formation present.  Small joint effusion noted.  Atherosclerotic calcification noted in both knees.    Impression      Advanced degenerative changes in the right knee, most pronounced in the medial tibiofemoral compartment.        Past Medical History:  11/08/2011: Abnormality of lung      Comment: Stable bandlike opacities at the lung bases,                most likely representing    No date: Anxiety  No date: Arthritis  No date: CAD (coronary artery disease)  2/22/2011: Calculus of ureter  No date: Cataract  No date: CHF  (congestive heart failure)  7/29/2012: Chronic back pain  9/2010; 11/2010: Colon polyp  7/29/2012: Colon polyps  No date: Coronary artery disease involving native coron*  No date: Depression  No date: Diabetes mellitus  No date: Diabetes mellitus type II  7/27/2015: Diverticulitis large intestine  09/25/2010; 11/02/2010; 11/08/2011; 7/29/2012: Diverticulosis  08/25/2011: Duodenal disorder      Comment: Duodenal erosion noted on EGD.  8/24/2011: Duodenal ulcer, unspecified as acute or chroni*  12/2010: E. coli sepsis      Comment: Due to left ureteral stone with left                nephrostomy tube - hospitalized in [place]  01/24/2012: Esophageal dysmotility      Comment: Noted on upper GI-barium swallow.  1969: Facial weakness      Comment: Left facial weakness s/p left mastoidectomy in               ~ 1969.  11/08/2011: Fatty liver      Comment: Reported on CT-abdomen and in 06/2012 Gastro                clinic visit note.  09/29/2010: Gastric polyp  7/29/2012: GERD (gastroesophageal reflux disease)  11/08/2011: Hepatomegaly      Comment: Reported on CT-abdomen  2/28/2011: Herpes zoster with other nervous system compli*  06/26/2006; 09/29/2010; 08/25/2011: Hiatal hernia      Comment: Noted on barium swallow 2006; noted on  EGD                2011.  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  No date: Trouble in sleeping  No date: Type II or unspecified type diabetes mellitus *  No date: Type II or unspecified type diabetes mellitus *  No date: Type II or unspecified type diabetes mellitus *    Past Surgical History:  No date: BELPHAROPTOSIS REPAIR      Comment: s/p LAMBERTO levator repair - Dr. Dejesus  No date: CARDIAC CATHETERIZATION  3/13/2012: CARPAL TUNNEL RELEASE  No date: CHOLECYSTECTOMY  11/16/2016: COLONOSCOPY N/A      Comment: Procedure: COLONOSCOPY;  Surgeon: Chris Storey MD;  [facility]                 Service: Endoscopy;  Laterality: N/A;  6/14/2017: COLONOSCOPY N/A      Comment: Procedure: COLONOSCOPY;  Surgeon: Chris Storey MD;  Location: [facility] ENDO ([facility]);                 Service: Endoscopy;  Laterality: N/A;                 colonoscopy in 3 months with better bowel prep.               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  No date: NEPHROSTOMY  No date: OOPHORECTOMY  9/13/2012: TOTAL KNEE ARTHROPLASTY      Comment: Left    Review of patient's family history indicates:    Sleep apnea                    Sister                    Cancer                         Sister                      Comment: breast    Diabetes                       Sister                    Breast cancer                  Sister                    Cancer                         Mother                      Comment: brain tumor    Hypertension                   Mother                    Heart disease                  Father                    Heart attack                   Father                    Dementia                       Brother                   Diabetes                       Brother                    Lupus                          Brother                   No Known Problems              Daughter                  No Known Problems              Son                       Diabetes                       Sister                    Lupus                          Sister                    Diabetes                       Sister                    Cancer                         Sister                    Kidney disease                 Sister                    Heart disease                  Sister                    Liver cancer                   Brother                   Cirrhosis                      Brother                   No Known Problems              Daughter                  No Known Problems              Son                       No Known Problems              Son                       Diabetes                       Other                     Ovarian cancer                 Other                       Comment: Niece     Breast cancer                  Maternal Aunt             Glaucoma                       Neg Hx                    Blindness                      Neg Hx                    Celiac disease                 Neg Hx                    Colon cancer                   Neg Hx                    Colon polyps                   Neg Hx                    Esophageal cancer              Neg Hx                    Inflammatory bowel disease     Neg Hx                    Liver disease                  Neg Hx                    Rectal cancer                  Neg Hx                    Stomach cancer                 Neg Hx                    Ulcerative colitis             Neg Hx                    Melanoma                       Neg Hx                    Multiple sclerosis             Neg Hx                    Psoriasis                      Neg Hx                      Social History    Marital status: Single              Spouse name:                       Years of education:                 Number of  "children: 5             Social History Main Topics    Smoking status: Former Smoker                                                                Packs/day: 1.00      Years: 15.00          Types: Cigarettes       Quit date: 7/24/2000    Smokeless tobacco: Never Used                        Alcohol use: No              Drug use: No              Sexual activity: No                   Social History Narrative    Single with 5 children. Lives alone. Retired cook.        Current Outpatient Prescriptions:  ACCU-CHEK FASTCLIX Misc, , Disp: , Rfl:   ACCU-CHEK SMARTVIEW TEST STRIP Strp, TEST THREE TIMES DAILY, Disp: 300 strip, Rfl: 3  albuterol (PROVENTIL) 2.5 mg /3 mL (0.083 %) nebulizer solution, Take 3 mLs (2.5 mg total) by nebulization every 6 (six) hours as needed for Wheezing. Rescue, Disp: 90 mL, Rfl: 6  ALCOHOL ANTISEPTIC PADS (ALCOHOL PREP PADS TOP), , Disp: , Rfl:   aspirin (ECOTRIN) 81 MG EC tablet, Take 1 tablet by mouth Daily., Disp: , Rfl:   atorvastatin (LIPITOR) 40 MG tablet, Take 1 tablet (40 mg total) by mouth once daily., Disp: 90 tablet, Rfl: 3  BD ULTRA-FINE OLU PEN NEEDLES 32 x 5/32 " Ndle, Uses 4 times daily, on multiple daily insulin injections, Disp: 130 each, Rfl: 12  carvedilol (COREG) 25 MG tablet, TAKE 1 TABLET TWICE DAILY, Disp: 180 tablet, Rfl: 3  cholecalciferol, vitamin D3, (VITAMIN D) 2,000 unit Cap, Take by mouth. 1 Capsule Oral Every morning, Disp: , Rfl:   diaper,brief,adult,disposable Misc, , Disp: , Rfl:   dicyclomine (BENTYL) 10 MG capsule, Take 1 capsule (10 mg total) by mouth 3 (three) times daily as needed (abdominal pain)., Disp: 60 capsule, Rfl: 3  duloxetine (CYMBALTA) 60 MG capsule, Take 1 capsule (60 mg total) by mouth once daily., Disp: 30 capsule, Rfl: 11  fluticasone (FLONASE) 50 mcg/actuation nasal spray, SHAKE LQ AND U 2 SPRAYS IEN QD, Disp: 1 Bottle, Rfl: 6  furosemide (LASIX) 40 MG tablet, TAKE 1 TABLET twice daily, Disp: 270 tablet, Rfl: 3  gabapentin (NEURONTIN) 100 MG " "capsule, TAKE 1 CAPSULE THREE TIMES DAILY  AND TAKE 3 CAPSULES AT BEDTIME, Disp: 540 capsule, Rfl: 2  insulin aspart (NOVOLOG) 100 unit/mL InPn pen, 23 units with meals plus correction scale. Max daily dose=120 units, Disp: 8 Box, Rfl: 3  insulin glargine (LANTUS SOLOSTAR) 100 unit/mL (3 mL) InPn pen, Inject 60 Units into the skin every evening., Disp: 4 Box, Rfl: 3  Lactobacillus acidophilus (PROBIOTIC) 10 billion cell Cap, Take 1 capsule by mouth once daily., Disp: , Rfl:   loratadine (CLARITIN) 10 mg tablet, Take 1 tablet (10 mg total) by mouth once daily., Disp: 90 tablet, Rfl: 4  MUSCLE RUB, WITH CAMPHOR, 4-30-10 % Crea, , Disp: , Rfl:   nitroGLYCERIN (NITROSTAT) 0.4 MG SL tablet, Place 1 tablet (0.4 mg total) under the tongue every 5 (five) minutes as needed., Disp: 25 tablet, Rfl: 2  nystatin (MYCOSTATIN) cream, Apply topically 2 (two) times daily., Disp: 120 g, Rfl: 3  ofloxacin (OCUFLOX) 0.3 % ophthalmic solution, Place 1 drop into the left eye 3 (three) times daily., Disp: 5 mL, Rfl: 1  ondansetron (ZOFRAN) 4 MG tablet, Take 1 tablet (4 mg total) by mouth every 8 (eight) hours as needed for Nausea., Disp: 30 tablet, Rfl: 3  (START ON 3/19/2018) oxyCODONE-acetaminophen (PERCOCET)  mg per tablet, Take 1 tablet by mouth 5 (five) times daily., Disp: 150 tablet, Rfl: 0  oxyCODONE-acetaminophen (PERCOCET)  mg per tablet, Take 1 tablet by mouth 5 (five) times daily., Disp: 150 tablet, Rfl: 0  pantoprazole (PROTONIX) 40 MG tablet, TAKE 1 TABLET(40 MG) BY MOUTH twice daily, Disp: 180 tablet, Rfl: 4  pen needle, diabetic (SURE-FINE PEN NEEDLES) 29 gauge x 1/2" Ndle, Use 4 times daily, Disp: 400 each, Rfl: 4  prednisoLONE acetate (PRED FORTE) 1 % DrpS, Place 1 drop into the left eye 3 (three) times daily., Disp: 5 mL, Rfl: 1  promethazine (PHENERGAN) 6.25 mg/5 mL syrup, Take 5 mLs (6.25 mg total) by mouth every 6 (six) hours as needed (cough)., Disp: 118 mL, Rfl: 0  tizanidine (ZANAFLEX) 4 MG tablet, Take " 1 tablet (4 mg total) by mouth nightly as needed., Disp: 30 tablet, Rfl: 3  triamcinolone acetonide 0.1% (KENALOG) 0.1 % cream, Apply topically 3 (three) times daily., Disp: 28.4 g, Rfl: 0  VENTOLIN HFA 90 mcg/actuation inhaler, INHALE 2 PUFFS INTO THE LUNGS  EVERY 4 HOURS AS NEEDED FOR ASTHMA, Disp: 18 g, Rfl: 1    No current facility-administered medications for this visit.       Review of patient's allergies indicates:   -- Crestor (rosuvastatin)     --  Cramping in legs   -- Ezetimibe     --  Other reaction(s): abdominal pain, Diarrhea   -- Hydrocodone     --  Other reaction(s): Itching   -- Lisinopril     --  Other reaction(s): cough   -- Sulfa (sulfonamide antibiotics)     --  Itching and Rash   -- Sulfamethoxazole    -- Sulfamethoxazole-trimethoprim    -- Trimethoprim    -- Valsartan     --  Other reaction(s): Angioedema        Review of Systems   Constitution: Negative for weight gain and weight loss.   HENT: Negative for ear pain and sore throat.    Cardiovascular: Positive for dyspnea on exertion. Negative for chest pain.   Respiratory: Negative for cough and shortness of breath.    Skin: Positive for dry skin.   Musculoskeletal: Positive for back pain, joint pain, joint swelling, myalgias and neck pain.   Gastrointestinal: Negative for abdominal pain, bowel incontinence and nausea.   Genitourinary: Negative for bladder incontinence.   Neurological: Positive for dizziness, headaches and numbness (bilateral hands better after injection).         Objective:        General: Henrietta is well-developed, well-nourished, appears stated age, in no acute distress, alert and oriented to time, place and person.     General    Vitals reviewed.  Constitutional: She is oriented to person, place, and time. She appears well-developed and well-nourished.   HENT:   Head: Normocephalic and atraumatic.   Pulmonary/Chest: Effort normal.   Neurological: She is alert and oriented to person, place, and time.   Psychiatric: She has  a normal mood and affect. Her behavior is normal. Judgment and thought content normal.     General Musculoskeletal Exam   Gait: normal     Back (L-Spine & T-Spine) / Neck (C-Spine) Exam     Tenderness Right paramedian tenderness of the Sacrum, Lower T-Spine, Upper L-Spine and Lower L-Spine. Left paramedian tenderness of the Sacrum, Lower L-Spine, Upper L-Spine and Lower T-Spine.     Back (L-Spine & T-Spine) Range of Motion   Extension: 10   Flexion: 70   Lateral bend right: 10   Lateral bend left: 10   Rotation right: 20   Rotation left: 20     Spinal Sensation   Right Side Sensation  C-Spine Level: normal   L-Spine Level: normal  S-Spine Level: normal  Left Side Sensation  C-Spine Level: normal  L-Spine Level: normal  S-Spine Level: normal    Other She has no scoliosis .  Spinal Kyphosis:  Absent      Right Hand/Wrist Exam     Tests   Phalens sign: positive  Tinel's sign (median nerve): positive    Atrophy   Thenar:  negative  Hypothenar:  negative      Left Hand/Wrist Exam     Tests   Phalens sign: positive  Tinel's sign (median nerve): positive    Atrophy  Thenar:  Negative  Hypothenar:  negative          Muscle Strength   Right Upper Extremity   Biceps: 5/5/5   Deltoid:  5/5  Triceps:  5/5  Wrist extension: 5/5/5   : 5/5/5   Finger Flexors:  5/5  Left Upper Extremity  Biceps: 5/5/5   Deltoid:  5/5  Triceps:  5/5  Wrist extension: 5/5/5   :  5/5/5   Finger Flexors:  5/5  Right Lower Extremity   Hip Flexion: 5/5   Quadriceps:  5/5   Anterior tibial:  5/5/5  EHL:  5/5  Left Lower Extremity   Hip Flexion: 5/5   Quadriceps:  5/5   Anterior tibial:  5/5/5   EHL:  5/5    Reflexes     Left Side  Biceps:  2+  Triceps:  2+  Brachioradialis:  2+  Quadriceps:  2+  Achilles:  2+  Left Nichols's Sign:  Absent  Babinski Sign:  absent    Right Side   Biceps:  2+  Triceps:  2+  Brachioradialis:  2+  Quadriceps:  2+  Achilles:  2+  Right Nichols's Sign:  absent  Babinski Sign:  absent    Vascular Exam     Right  Pulses        Carotid:                  2+    Left Pulses        Carotid:                  2+    Capillary Refill  Right Hand: normal capillary refill  Left Hand: normal capillary refill              Assessment:       1. DDD (degenerative disc disease), lumbar    2. Chronic bilateral low back pain without sciatica    3. Leg swelling    4. Edema, unspecified type     5. Lumbar spondylosis    6. Spinal stenosis of lumbar region, unspecified whether neurogenic claudication present    7. Bilateral carpal tunnel syndrome    8. Spinal enthesopathy           Plan:       Orders Placed This Encounter    Procedure Order to [facility]    VAS US Venous Legs Bilateral     1. Carpal tunnel injection done on 2/2018  2.  hand splints at night  3.she has stopped gabapentin  4. She did not go to more PT because of the right leg and feeling like cannot walk at times  5. Continue tizanidine, she says she is taking a half at night, because full pill makes her drowsy, but not sleeping as well.  She does have muscle tenderness.  She does better when comes to PT, It is likely she is not moving much when home.  I encourage her to move her  6.  She does have knee djd, but discussed it would not make the entire leg swell and the swelling would not come and go  7.  Right L5 TF JANEE with pain management to see if helps right leg pain.    8.   Us of the legs.  She complains of leg swelling, however no swelling today.  She feels like it comes and goes and is entire leg.  We will look at US.  She is going to see [facility] next week  9.  rtc 3 months      Follow-up: Follow up in about 3 months (around 10/24/2019). If there are any questions prior to this, the patient was instructed to contact the office.

## 2019-07-24 ENCOUNTER — OFFICE VISIT (OUTPATIENT)
Dept: SPINE | Facility: CLINIC | Age: 79
End: 2019-07-24
Attending: PHYSICAL MEDICINE & REHABILITATION
Payer: MEDICARE

## 2019-07-24 ENCOUNTER — OFFICE VISIT (OUTPATIENT)
Dept: PODIATRY | Facility: CLINIC | Age: 79
End: 2019-07-24
Payer: MEDICARE

## 2019-07-24 VITALS
WEIGHT: 216 LBS | HEIGHT: 66 IN | DIASTOLIC BLOOD PRESSURE: 73 MMHG | HEART RATE: 71 BPM | SYSTOLIC BLOOD PRESSURE: 124 MMHG | BODY MASS INDEX: 34.72 KG/M2

## 2019-07-24 VITALS
DIASTOLIC BLOOD PRESSURE: 66 MMHG | SYSTOLIC BLOOD PRESSURE: 103 MMHG | BODY MASS INDEX: 32.84 KG/M2 | HEART RATE: 72 BPM | HEIGHT: 68 IN | WEIGHT: 216.69 LBS | TEMPERATURE: 98 F

## 2019-07-24 DIAGNOSIS — G56.03 BILATERAL CARPAL TUNNEL SYNDROME: ICD-10-CM

## 2019-07-24 DIAGNOSIS — M47.816 LUMBAR SPONDYLOSIS: ICD-10-CM

## 2019-07-24 DIAGNOSIS — R60.9 EDEMA, UNSPECIFIED TYPE: ICD-10-CM

## 2019-07-24 DIAGNOSIS — G89.29 CHRONIC BILATERAL LOW BACK PAIN WITHOUT SCIATICA: ICD-10-CM

## 2019-07-24 DIAGNOSIS — M46.00 SPINAL ENTHESOPATHY: ICD-10-CM

## 2019-07-24 DIAGNOSIS — M51.36 DDD (DEGENERATIVE DISC DISEASE), LUMBAR: Primary | ICD-10-CM

## 2019-07-24 DIAGNOSIS — L84 CORN OR CALLUS: ICD-10-CM

## 2019-07-24 DIAGNOSIS — E11.42 DIABETIC POLYNEUROPATHY ASSOCIATED WITH TYPE 2 DIABETES MELLITUS: Primary | ICD-10-CM

## 2019-07-24 DIAGNOSIS — M54.50 CHRONIC BILATERAL LOW BACK PAIN WITHOUT SCIATICA: ICD-10-CM

## 2019-07-24 DIAGNOSIS — M48.061 SPINAL STENOSIS OF LUMBAR REGION, UNSPECIFIED WHETHER NEUROGENIC CLAUDICATION PRESENT: ICD-10-CM

## 2019-07-24 DIAGNOSIS — M79.89 LEG SWELLING: ICD-10-CM

## 2019-07-24 DIAGNOSIS — B35.1 ONYCHOMYCOSIS DUE TO DERMATOPHYTE: ICD-10-CM

## 2019-07-24 PROCEDURE — 99999 PR PBB SHADOW E&M-EST. PATIENT-LVL III: ICD-10-PCS | Mod: PBBFAC,HCNC,, | Performed by: PODIATRIST

## 2019-07-24 PROCEDURE — 11057 PR TRIM BENIGN HYPERKERATOTIC SKIN LESION,>4: ICD-10-PCS | Mod: Q9,HCNC,S$GLB, | Performed by: PODIATRIST

## 2019-07-24 PROCEDURE — 11721 PR DEBRIDEMENT OF NAILS, 6 OR MORE: ICD-10-PCS | Mod: Q9,59,HCNC,S$GLB | Performed by: PODIATRIST

## 2019-07-24 PROCEDURE — 99999 PR PBB SHADOW E&M-EST. PATIENT-LVL III: ICD-10-PCS | Mod: PBBFAC,HCNC,, | Performed by: PHYSICAL MEDICINE & REHABILITATION

## 2019-07-24 PROCEDURE — 99999 PR PBB SHADOW E&M-EST. PATIENT-LVL III: CPT | Mod: PBBFAC,HCNC,, | Performed by: PHYSICAL MEDICINE & REHABILITATION

## 2019-07-24 PROCEDURE — 1101F PT FALLS ASSESS-DOCD LE1/YR: CPT | Mod: HCNC,CPTII,S$GLB, | Performed by: PHYSICAL MEDICINE & REHABILITATION

## 2019-07-24 PROCEDURE — 11721 DEBRIDE NAIL 6 OR MORE: CPT | Mod: Q9,59,HCNC,S$GLB | Performed by: PODIATRIST

## 2019-07-24 PROCEDURE — 99214 OFFICE O/P EST MOD 30 MIN: CPT | Mod: HCNC,S$GLB,, | Performed by: PHYSICAL MEDICINE & REHABILITATION

## 2019-07-24 PROCEDURE — 99499 NO LOS: ICD-10-PCS | Mod: HCNC,S$GLB,, | Performed by: PODIATRIST

## 2019-07-24 PROCEDURE — 3078F PR MOST RECENT DIASTOLIC BLOOD PRESSURE < 80 MM HG: ICD-10-PCS | Mod: HCNC,CPTII,S$GLB, | Performed by: PHYSICAL MEDICINE & REHABILITATION

## 2019-07-24 PROCEDURE — 99499 UNLISTED E&M SERVICE: CPT | Mod: HCNC,S$GLB,, | Performed by: PODIATRIST

## 2019-07-24 PROCEDURE — 99999 PR PBB SHADOW E&M-EST. PATIENT-LVL III: CPT | Mod: PBBFAC,HCNC,, | Performed by: PODIATRIST

## 2019-07-24 PROCEDURE — 99499 RISK ADDL DX/OHS AUDIT: ICD-10-PCS | Mod: HCNC,S$GLB,, | Performed by: PHYSICAL MEDICINE & REHABILITATION

## 2019-07-24 PROCEDURE — 3074F SYST BP LT 130 MM HG: CPT | Mod: HCNC,CPTII,S$GLB, | Performed by: PHYSICAL MEDICINE & REHABILITATION

## 2019-07-24 PROCEDURE — 3074F PR MOST RECENT SYSTOLIC BLOOD PRESSURE < 130 MM HG: ICD-10-PCS | Mod: HCNC,CPTII,S$GLB, | Performed by: PHYSICAL MEDICINE & REHABILITATION

## 2019-07-24 PROCEDURE — 11057 PARNG/CUTG B9 HYPRKR LES >4: CPT | Mod: Q9,HCNC,S$GLB, | Performed by: PODIATRIST

## 2019-07-24 PROCEDURE — 3078F DIAST BP <80 MM HG: CPT | Mod: HCNC,CPTII,S$GLB, | Performed by: PHYSICAL MEDICINE & REHABILITATION

## 2019-07-24 PROCEDURE — 99499 UNLISTED E&M SERVICE: CPT | Mod: HCNC,S$GLB,, | Performed by: PHYSICAL MEDICINE & REHABILITATION

## 2019-07-24 PROCEDURE — 99214 PR OFFICE/OUTPT VISIT, EST, LEVL IV, 30-39 MIN: ICD-10-PCS | Mod: HCNC,S$GLB,, | Performed by: PHYSICAL MEDICINE & REHABILITATION

## 2019-07-24 PROCEDURE — 1101F PR PT FALLS ASSESS DOC 0-1 FALLS W/OUT INJ PAST YR: ICD-10-PCS | Mod: HCNC,CPTII,S$GLB, | Performed by: PHYSICAL MEDICINE & REHABILITATION

## 2019-07-24 NOTE — PROGRESS NOTES
Subjective:      Patient ID: Henrietta Villasenor is a 79 y.o. female.    Chief Complaint: Diabetic polyneuropathy associated with type 2 diabetes ally (bilateral pcp Dr Cartagena 6/24/19 ) and Foot Pain (feel like feet falling off)    Henrietta is a 79 y.o. female who presents to the clinic for evaluation and treatment of high risk feet. Henrietta has a past medical history of Abnormality of lung (11/08/2011), Anxiety, Arthritis, CAD (coronary artery disease), Calculus of ureter (2/22/2011), Cataract, CHF (congestive heart failure), Chronic back pain (7/29/2012), Colon polyp (9/2010; 11/2010), Colon polyps (7/29/2012), Coronary artery disease involving native coronary artery of native heart with angina pectoris, Depression, Diabetes mellitus, Diabetes mellitus type II, Diverticulitis large intestine (7/27/2015), Diverticulosis (09/25/2010; 11/02/2010; 11/08/2011; 7/29/2012), Duodenal disorder (08/25/2011), Duodenal ulcer, unspecified as acute or chronic, without hemorrhage, perforation, or obstruction (8/24/2011), E. 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), 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), Obesity, MARLON (obstructive sleep apnea), Pain, Peripheral neuropathy, Pneumonia, Polyneuropathy, Postherpetic neuralgia, Recurrent nephrolithiasis, S/P knee replacement (10/2/2012), S/P TKR (total knee replacement) (12/26/2012), Sensorineural hearing loss of both ears, Thyroid  disease (11/08/2011), Trouble in sleeping, Type II or unspecified type diabetes mellitus with neurological manifestations, not stated as uncontrolled(250.60), Type II or unspecified type diabetes mellitus with peripheral circulatory disorders, not stated as uncontrolled(250.70), and Type II or unspecified type diabetes mellitus with renal manifestations, not stated as uncontrolled(250.40). The patient's chief complaint is long, thick toenails. This patient has documented high risk feet requiring routine maintenance secondary to diabetes mellitis and those secondary complications of diabetes, as mentioned..       PCP: Jeannine Arriaga MD    Date Last Seen by PCP:   Chief Complaint   Patient presents with    Diabetic polyneuropathy associated with type 2 diabetes ally     bilateral pcp Dr Cartagena 6/24/19     Foot Pain     feel like feet falling off         Current shoe gear: casual shoes    Hemoglobin A1C   Date Value Ref Range Status   06/10/2019 7.9 (H) 4.0 - 5.6 % Final     Comment:     ADA Screening Guidelines:  5.7-6.4%  Consistent with prediabetes  >or=6.5%  Consistent with diabetes  High levels of fetal hemoglobin interfere with the HbA1C  assay. Heterozygous hemoglobin variants (HbS, HgC, etc)do  not significantly interfere with this assay.   However, presence of multiple variants may affect accuracy.     02/13/2019 7.3 (H) 4.0 - 5.6 % Final     Comment:     ADA Screening Guidelines:  5.7-6.4%  Consistent with prediabetes  >or=6.5%  Consistent with diabetes  High levels of fetal hemoglobin interfere with the HbA1C  assay. Heterozygous hemoglobin variants (HbS, HgC, etc)do  not significantly interfere with this assay.   However, presence of multiple variants may affect accuracy.     10/03/2018 8.4 (H) 4.0 - 5.6 % Final     Comment:     ADA Screening Guidelines:  5.7-6.4%  Consistent with prediabetes  >or=6.5%  Consistent with diabetes  High levels of fetal hemoglobin interfere with the HbA1C  assay.  "Heterozygous hemoglobin variants (HbS, HgC, etc)do  not significantly interfere with this assay.   However, presence of multiple variants may affect accuracy.         Review of Systems   Constitution: Negative for chills, decreased appetite and fever.   Cardiovascular: Negative for leg swelling.   Skin: Positive for dry skin and nail changes. Negative for color change, flushing, itching, poor wound healing and rash.   Musculoskeletal: Positive for arthritis and gout. Negative for back pain, falls, joint pain, joint swelling, muscle cramps and myalgias.   Gastrointestinal: Negative for nausea and vomiting.   Neurological: Positive for numbness and paresthesias. Negative for loss of balance.           Objective:       Vitals:    07/24/19 1128   BP: 124/73   Pulse: 71   Weight: 98 kg (216 lb)   Height: 5' 6" (1.676 m)   PainSc:   4        Physical Exam   Constitutional: She is oriented to person, place, and time. She appears well-developed and well-nourished.   Cardiovascular:   Dorsalis pedis and posterior tibial pulses are diminished Bilaterally. Toes are cool to touch. Feet are warm proximally.There is decreased digital hair. Skin is atrophic, slightly hyperpigmented, and mildly edematous       Musculoskeletal: Normal range of motion. She exhibits no edema or tenderness.   Adequate joint range of motion without pain, limitation, nor crepitation Bilateral feet and ankle joints. Muscle strength is 5/5 in all groups bilaterally.      semi reducible IPJ digital contracture 2nd toe b/l    Neurological: She is alert and oriented to person, place, and time.   [place] 5.07 monofilamant testing is diminished Ymnor feet. Sharp/dull sensation diminished Bilaterally. Light touch absent Bilaterally.       Skin: Skin is warm, dry and intact. No abrasion, no bruising, no burn, no ecchymosis and no lesion noted. She is not diaphoretic. No erythema. No pallor.   Nails x 10  are elongated by 2-5mm's, thickened by 2-5 mm's, " dystrophic, and are darkened in  coloration . Xerosis Bilaterally. No open lesions noted.    Hyperkeratotic tissue noted to distal toes 1-4 b/l        Psychiatric: She has a normal mood and affect. Her behavior is normal.   Nursing note and vitals reviewed.            Assessment:       Encounter Diagnoses   Name Primary?    Diabetic polyneuropathy associated with type 2 diabetes mellitus Yes    Onychomycosis due to dermatophyte     Corn or callus          Plan:       Henrietta was seen today for diabetic polyneuropathy associated with type 2 diabetes ally and foot pain.    Diagnoses and all orders for this visit:    Diabetic polyneuropathy associated with type 2 diabetes mellitus    Onychomycosis due to dermatophyte    Corn or callus      I counseled the patient on her conditions, their implications and medical management.    Shoe inspection. Diabetic Foot Education. Patient reminded of the importance of good nutrition and blood sugar control to help prevent podiatric complications of diabetes. Patient instructed on proper foot hygeine. We discussed wearing proper shoe gear, daily foot inspections, never walking without protective shoe gear, never putting sharp instruments to feet    - With patient's permission, nails were aggressively reduced and debrided x 10 to their soft tissue attachment mechanically and with electric , removing all offending nail and debris. Patient relates relief following the procedure. She will continue to monitor the areas daily, inspect her feet, wear protective shoe gear when ambulatory, moisturizer to maintain skin integrity and follow in this office in approximately 2-3 months, sooner p.r.n.    - After cleansing the  area w/ alcohol prep pad the above mentioned hyperkeratosis was trimmed utilizing No 15 scapel, to a smooth base with out incident. Patient tolerated this  well and reported comfort to the area of distal toes 1-4 b/l   .

## 2019-07-25 ENCOUNTER — TELEPHONE (OUTPATIENT)
Dept: INTERNAL MEDICINE | Facility: CLINIC | Age: 79
End: 2019-07-25

## 2019-07-25 ENCOUNTER — TELEPHONE (OUTPATIENT)
Dept: PAIN MEDICINE | Facility: CLINIC | Age: 79
End: 2019-07-25

## 2019-07-25 DIAGNOSIS — M51.36 DDD (DEGENERATIVE DISC DISEASE), LUMBAR: Primary | ICD-10-CM

## 2019-07-25 NOTE — TELEPHONE ENCOUNTER
Left a massg for Mendel   Looks like the original script went to the [facility]   Waiting on a rtn call

## 2019-07-25 NOTE — TELEPHONE ENCOUNTER
Called pt and asked her if she wanted it changed to sejal jane the original script was sent to the [facility]  She said no not to change it she will pick it up at our pharmacy here in the building

## 2019-07-25 NOTE — TELEPHONE ENCOUNTER
----- Message from Ngoc Coy sent at 7/25/2019  4:02 PM CDT -----  Contact: Ghanshyamcarmen Isatu [phone]  Pharmacy is calling to clarify an RX.  RX name:  Cymbalta  What do they need to clarify:  Pharmacy does not have an Rx for Cymbalta 60mg.  You can call and leave a verbal.  Comments:     Please call and advise  Thank you

## 2019-07-25 NOTE — TELEPHONE ENCOUNTER
----- Message from Amina Swartz sent at 7/25/2019  3:40 PM CDT -----  Contact: Mendel/[phone]  Pharmacist would like to know if patient is suppose to be on -DULoxetine (CYMBALTA) 60 MG capsule. Patient is asking for refill. Please call and advise. [id number] [place] [id number] AT [facility] [address] [phone] (Phone) [phone] (Fax) Thank you

## 2019-07-29 ENCOUNTER — TELEPHONE (OUTPATIENT)
Dept: INTERNAL MEDICINE | Facility: CLINIC | Age: 79
End: 2019-07-29

## 2019-07-29 ENCOUNTER — OFFICE VISIT (OUTPATIENT)
Dept: INTERNAL MEDICINE | Facility: CLINIC | Age: 79
End: 2019-07-29
Payer: MEDICARE

## 2019-07-29 VITALS
DIASTOLIC BLOOD PRESSURE: 88 MMHG | BODY MASS INDEX: 35.01 KG/M2 | SYSTOLIC BLOOD PRESSURE: 160 MMHG | OXYGEN SATURATION: 98 % | HEIGHT: 66 IN | WEIGHT: 217.81 LBS | TEMPERATURE: 98 F | HEART RATE: 76 BPM

## 2019-07-29 DIAGNOSIS — M79.604 RIGHT LEG PAIN: ICD-10-CM

## 2019-07-29 DIAGNOSIS — M17.11 PRIMARY OSTEOARTHRITIS OF RIGHT KNEE: ICD-10-CM

## 2019-07-29 DIAGNOSIS — R60.0 LEG EDEMA, RIGHT: Primary | ICD-10-CM

## 2019-07-29 DIAGNOSIS — R10.13 EPIGASTRIC ABDOMINAL PAIN: ICD-10-CM

## 2019-07-29 DIAGNOSIS — R11.0 NAUSEA: ICD-10-CM

## 2019-07-29 DIAGNOSIS — E66.9 OBESITY (BMI 35.0-39.9 WITHOUT COMORBIDITY): ICD-10-CM

## 2019-07-29 PROCEDURE — 3077F SYST BP >= 140 MM HG: CPT | Mod: HCNC,CPTII,S$GLB, | Performed by: NURSE PRACTITIONER

## 2019-07-29 PROCEDURE — 99213 PR OFFICE/OUTPT VISIT, EST, LEVL III, 20-29 MIN: ICD-10-PCS | Mod: HCNC,S$GLB,, | Performed by: NURSE PRACTITIONER

## 2019-07-29 PROCEDURE — 3079F PR MOST RECENT DIASTOLIC BLOOD PRESSURE 80-89 MM HG: ICD-10-PCS | Mod: HCNC,CPTII,S$GLB, | Performed by: NURSE PRACTITIONER

## 2019-07-29 PROCEDURE — 99999 PR PBB SHADOW E&M-EST. PATIENT-LVL IV: ICD-10-PCS | Mod: PBBFAC,HCNC,, | Performed by: NURSE PRACTITIONER

## 2019-07-29 PROCEDURE — 99213 OFFICE O/P EST LOW 20 MIN: CPT | Mod: HCNC,S$GLB,, | Performed by: NURSE PRACTITIONER

## 2019-07-29 PROCEDURE — 1101F PT FALLS ASSESS-DOCD LE1/YR: CPT | Mod: HCNC,CPTII,S$GLB, | Performed by: NURSE PRACTITIONER

## 2019-07-29 PROCEDURE — 3079F DIAST BP 80-89 MM HG: CPT | Mod: HCNC,CPTII,S$GLB, | Performed by: NURSE PRACTITIONER

## 2019-07-29 PROCEDURE — 99999 PR PBB SHADOW E&M-EST. PATIENT-LVL IV: CPT | Mod: PBBFAC,HCNC,, | Performed by: NURSE PRACTITIONER

## 2019-07-29 PROCEDURE — 1101F PR PT FALLS ASSESS DOC 0-1 FALLS W/OUT INJ PAST YR: ICD-10-PCS | Mod: HCNC,CPTII,S$GLB, | Performed by: NURSE PRACTITIONER

## 2019-07-29 PROCEDURE — 3077F PR MOST RECENT SYSTOLIC BLOOD PRESSURE >= 140 MM HG: ICD-10-PCS | Mod: HCNC,CPTII,S$GLB, | Performed by: NURSE PRACTITIONER

## 2019-07-29 RX ORDER — ONDANSETRON 4 MG/1
4 TABLET, FILM COATED ORAL EVERY 8 HOURS PRN
Qty: 30 TABLET | Refills: 0 | Status: SHIPPED | OUTPATIENT
Start: 2019-07-29 | End: 2020-01-29

## 2019-07-29 RX ORDER — ONDANSETRON 4 MG/1
4 TABLET, FILM COATED ORAL EVERY 8 HOURS PRN
Qty: 30 TABLET | Refills: 0 | Status: SHIPPED | OUTPATIENT
Start: 2019-07-29 | End: 2019-07-29 | Stop reason: SDUPTHER

## 2019-07-29 NOTE — TELEPHONE ENCOUNTER
----- Message from Lulu Rosenberg MA sent at 7/29/2019  9:28 AM CDT -----  Contact: self/[phone]      ----- Message -----  From: Osmani Soria  Sent: 7/29/2019   9:15 AM  To: Bart WILHELM Staff    Pt is calling to speak with someone in the office in regards to the last couple weeks of having stomach aches,headaches and her legs are bothering her as well. Pt states that she's supposed to be seen Wednesday but she can't wait for that long. Please call and advise.          Thank You

## 2019-07-29 NOTE — PATIENT INSTRUCTIONS
Schedule Vascular US of right leg per [facility], rule out DVT. Recommend you follow plan of care per [facility], they have ordered this and suggested an injection that has been scheduled see upcoming appts below.    May try Tylenol otc as needed for pain    Elevate legs, do not sit with them prone long periods, move more.    Check US of abdomen, Zofran sent to pharmacy as needed for nausea

## 2019-07-29 NOTE — PROGRESS NOTES
Subjective:       Patient ID: Henrietta Villasenor is a 79 y.o. female.    Chief Complaint: Knee Pain; Abdominal Pain; and Headache    Pt of Dr. Arriaga, called yesterday and today stating for the last couple weeks she bas been having stomach aches, headaches, and her legs are bothering her as well.    She recently saw Spine clinic for the same issue. Dx with DJD of right knee. Referred for injections with Dr. Cooper, Pain management. Pt stopped going to PT as she states she cannot walk due to the pain. They also wanted to order an US Vascular of her right leg but this has not been scheduled. She is scheduled with the injection with Dr. Cooper on 8-15-19. Pt unaware. Pt voiced that what they are trying to do will not work. She was told to continue her tizanidine. She takes a half at night due to drowsy effect. She is not willing to continue with physical therapy. I have reviewed the recent specialists notes and plan of care.    Reports continued stomach pain and nausea, no vomiting. She states she has had this a while now. Dr. Arriaga aware. No vomiting. No fever at home.     Review of Systems   Constitutional: Negative for chills, fatigue and fever.   Respiratory: Negative for chest tightness and shortness of breath.    Cardiovascular: Positive for leg swelling. Negative for chest pain and palpitations.        As documented in HPI     Gastrointestinal: Positive for abdominal pain and nausea. Negative for abdominal distention, anal bleeding, blood in stool, constipation, diarrhea, rectal pain and vomiting.   Musculoskeletal: Positive for arthralgias, back pain and myalgias.        As documented in HPI     Skin: Negative for color change, pallor, rash and wound.   Allergic/Immunologic: Negative for environmental allergies, food allergies and immunocompromised state.   Neurological: Negative for dizziness and weakness.   Hematological: Negative for adenopathy. Does not bruise/bleed easily.   Psychiatric/Behavioral:  "Negative for suicidal ideas.         Review of patient's allergies indicates:   Allergen Reactions    Crestor [rosuvastatin]      Cramping in legs    Ezetimibe      Other reaction(s): abdominal pain, Diarrhea  Other reaction(s): abdominal pain    Hydrocodone      Other reaction(s): Itching    Lisinopril      Other reaction(s): cough      Sulfa (sulfonamide antibiotics)      Itching and Rash      Sulfamethoxazole     Sulfamethoxazole-trimethoprim     Trimethoprim     Valsartan      Other reaction(s): Angioedema       Current Outpatient Medications:     ACCU-CHEK FASTCLIX Misc, , Disp: , Rfl:     ACCU-CHEK SMARTVIEW TEST STRIP Strp, TEST THREE TIMES DAILY, Disp: 300 strip, Rfl: 3    albuterol (VENTOLIN HFA) 90 mcg/actuation inhaler, Inhale 2 puffs by mouth into the lungs every 4 (four) hours as needed for Wheezing. Rescue, Disp: 18 g, Rfl: 1    ALCOHOL ANTISEPTIC PADS (ALCOHOL PREP PADS TOP), , Disp: , Rfl:     allopurinol (ZYLOPRIM) 100 MG tablet, Take 1 tablet (100 mg total) by mouth once daily., Disp: 90 tablet, Rfl: 3    aspirin (ECOTRIN) 81 MG EC tablet, Take 1 tablet by mouth Daily., Disp: , Rfl:     azelastine (ASTELIN) 137 mcg (0.1 %) nasal spray, Spray 1 spray (137 mcg total) by Nasal route 2 (two) times daily., Disp: 30 mL, Rfl: 3    BD ALCOHOL SWABS PadM, USE  WHEN  TESTING BLOOD SUGAR FOUR TIMES DAILY, Disp: 400 each, Rfl: 4    BD ULTRA-FINE OLU PEN NEEDLES 32 x 5/32 " Ndle, Uses 4 times daily, on multiple daily insulin injections, Disp: 130 each, Rfl: 12    carbamide peroxide (EAR WAX REMOVAL DROPS OTIC), , Disp: , Rfl:     carvedilol (COREG) 25 MG tablet, TAKE 1 TABLET TWICE DAILY, Disp: 180 tablet, Rfl: 3    cholecalciferol, vitamin D3, (VITAMIN D3) 400 unit Cap, Take 400 Units by mouth once daily., Disp: , Rfl:     DULoxetine (CYMBALTA) 60 MG capsule, Take 1 capsule (60 mg total) by mouth 2 (two) times daily., Disp: 60 capsule, Rfl: 4    fluticasone (FLONASE) 50 mcg/actuation " "nasal spray, SHAKE LQ AND U 2 SPRAYS IEN QD, Disp: 1 Bottle, Rfl: 6    furosemide (LASIX) 40 MG tablet, TAKE 1 TABLET twice daily, Disp: 60 tablet, Rfl: 3    furosemide (LASIX) 40 MG tablet, TAKE 1 TABLET BY MOUTH TWICE DAILY, Disp: 270 tablet, Rfl: 0    insulin aspart (NOVOLOG) 100 unit/mL InPn pen, 23 units with meals plus correction scale. Max daily dose=120 units (Patient taking differently: Inject 23 Units into the skin 3 (three) times daily with meals. 23 units with meals plus correction scale. Max daily dose=120 units), Disp: 8 Box, Rfl: 3    Lactobacillus acidophilus (PROBIOTIC) 10 billion cell Cap, Take 1 capsule by mouth once daily., Disp: , Rfl:     LANTUS SOLOSTAR U-100 INSULIN glargine 100 units/mL (3mL) SubQ pen, INJECT 60 UNITS INTO THE SKIN EVERY EVENING., Disp: 60 mL, Rfl: 3    loratadine (CLARITIN) 10 mg tablet, TAKE 1 TABLET EVERY DAY, Disp: 90 tablet, Rfl: 4    MUSCLE RUB, WITH CAMPHOR, 4-30-10 % Crea, , Disp: , Rfl:     nitroGLYCERIN (NITROSTAT) 0.4 MG SL tablet, Place 1 tablet (0.4 mg total) under the tongue every 5 (five) minutes as needed., Disp: 25 tablet, Rfl: 2    olopatadine (PATADAY) 0.2 % Drop, Place 1 drop into both eyes once daily., Disp: 2.5 mL, Rfl: 12    oxyCODONE-acetaminophen (PERCOCET)  mg per tablet, Take one tablet by mouth 5 times daily, Disp: 150 tablet, Rfl: 0    pantoprazole (PROTONIX) 40 MG tablet, TAKE 1 TABLET(40 MG) BY MOUTH twice daily, Disp: 180 tablet, Rfl: 4    pen needle, diabetic (SURE-FINE PEN NEEDLES) 29 gauge x 1/2" Ndle, Use 4 times daily, Disp: 400 each, Rfl: 4    potassium chloride SA (K-DUR,KLOR-CON) 10 MEQ tablet, Take 1 tablet (10 mEq total) by mouth 2 (two) times daily., Disp: 60 tablet, Rfl: 11    promethazine-dextromethorphan (PROMETHAZINE-DM) 6.25-15 mg/5 mL Syrp, 2 tsp every 8 hours as needed for cough, Disp: 180 mL, Rfl: 1    tiZANidine (ZANAFLEX) 4 MG tablet, Take 0.5-1 tablets (2-4 mg total) by mouth every 8 (eight) hours., " Disp: 270 tablet, Rfl: 3    TRUEPLUS LANCETS 33 gauge Misc, TEST BLOOD SUGAR FOUR TIMES DAILY, Disp: 400 each, Rfl: 4    triamcinolone acetonide 0.1% (KENALOG) 0.1 % cream, Apply topically 3 (three) times daily., Disp: 28.4 g, Rfl: 0  No current facility-administered medications for this visit.     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    Neuralgia, neuritis, and radiculitis, unspecified    Dermatitis due to drugs and medicines taken internally(693.0)    Dermatochalasis    Diverticulosis of large intestine    Personal history of allergy to sulfonamides    Diastolic congestive heart failure    Knee pain    Anemia    Chronic right ear pain    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    Hearing loss, sensorineural    Stage 3 chronic kidney disease    Coronary artery disease involving native coronary artery of native heart with angina pectoris    Lactose intolerance    Allergic rhinitis    Hyperlipidemia    Aortic atherosclerosis    Spinal enthesopathy of lumbar region    LLQ abdominal pain    Diverticulitis of large intestine without perforation or abscess without bleeding    Esophageal dysphagia    Fatty liver    Painful swallowing    Left facial numbness    Neck pain on left side    Insulin dependent diabetes mellitus    Shortness of breath    Diabetic neuropathy associated with type 2 diabetes mellitus    Type 2 diabetes mellitus with stage 3 chronic kidney disease, with long-term current use of insulin    Obesity (BMI 30.0-34.9)     Low hemoglobin and low hematocrit    History of colon polyps    Type 2 diabetes mellitus with diabetic polyneuropathy, with long-term current use of insulin    Hyperparathyroidism    Obesity, diabetes, and hypertension syndrome    Spinal stenosis of lumbar region with neurogenic claudication    Senile nuclear sclerosis    Benign paroxysmal positional vertigo due to bilateral vestibular disorder    Tortuous aorta    Ectatic aorta    Insulin long-term use    Facet arthropathy    Mood disorder    Type 2 diabetes mellitus with hyperglycemia    Hypercholesterolemia    Hypovitaminosis D    Research study patient - TACT2 EDTA Chelation Study    History of MI (myocardial infarction)    Facial nerve palsy, secondary    Lumbar spondylosis    Myocardial infarction    Gout, arthritis    Vitreomacular adhesion, right    Epiretinal membrane, left     Past Medical History:   Diagnosis Date    Abnormality of lung 11/08/2011    Stable bandlike opacities at the lung bases, most likely representing      Anxiety     Arthritis     CAD (coronary artery disease)     Calculus of ureter 2/22/2011    Cataract     CHF (congestive heart failure)     Chronic back pain 7/29/2012    Colon polyp 9/2010; 11/2010    Colon polyps 7/29/2012    Coronary artery disease involving native coronary artery of native heart with angina pectoris     Depression     Diabetes mellitus     Diabetes mellitus type II     Diverticulitis large intestine 7/27/2015    Diverticulosis 09/25/2010; 11/02/2010; 11/08/2011; 7/29/2012    Duodenal disorder 08/25/2011    Duodenal erosion noted on EGD.    Duodenal ulcer, unspecified as acute or chronic, without hemorrhage, perforation, or obstruction 8/24/2011    E. coli sepsis 12/2010    Due to left ureteral stone with left nephrostomy tube - hospitalized in [place]    Esophageal dysmotility 01/24/2012    Noted on upper GI-barium swallow.    Facial weakness 1969    Left facial  weakness s/p left mastoidectomy in ~ 1969.    Fatty liver 11/08/2011    Reported on CT-abdomen and in 06/2012 Gastro clinic visit note.    Gastric polyp 09/29/2010    GERD (gastroesophageal reflux disease) 7/29/2012    Hepatomegaly 11/08/2011    Reported on CT-abdomen    Herpes zoster with other nervous system complications(053.19) 2/28/2011    Hiatal hernia 06/26/2006; 09/29/2010; 08/25/2011    Noted on barium swallow 2006; noted on  EGD 2011.   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    Trouble in sleeping     Type II or unspecified type diabetes mellitus with neurological manifestations, not stated as uncontrolled(250.60)     Type II or unspecified type diabetes mellitus with peripheral circulatory disorders, not stated as uncontrolled(250.70)     Type II or unspecified type diabetes mellitus with renal manifestations, not stated as uncontrolled(250.40)      Past Surgical History:   Procedure Laterality Date    BELPHAROPTOSIS REPAIR      s/p LAMBERTO levator repair - Dr. Dejesus    CARDIAC CATHETERIZATION      CARPAL TUNNEL RELEASE  3/13/2012    CATARACT EXTRACTION      CHOLECYSTECTOMY      COLONOSCOPY N/A 6/14/2017    [facility]    COLONOSCOPY N/A  11/16/2016    [facility] ([facility])    COLONOSCOPY N/A 8/8/2013    [facility] at [facility] ([facility])    EGD (ESOPHAGOGASTRODUODENOSCOPY) N/A 4/30/2014    [facility] ([facility])    EGD (ESOPHAGOGASTRODUODENOSCOPY) N/A 8/8/2013    [facility] (4[facility]LR)    ESOPHAGOGASTRODUODENOSCOPY (EGD) N/A 11/11/2015    [facility] (4TH FLR)    EXTRACORPOREAL SHOCK WAVE LITHOTRIPSY      INSERTION-INTRAOCULAR LENS (IOL) Right 10/31/2018    [facility] OR    INSERTION-INTRAOCULAR LENS (IOL) Left 3/22/2018    [facility] at [facility] OR [facility]LR    JOINT REPLACEMENT      mastoid tumor removal  1969    Left mastoidectomy with residual left facial weakness.    NEPHROSTOMY      OOPHORECTOMY      PHACOEMULSIFICATION-ASPIRATION-CATARACT Right 10/31/2018    [facility]formed by [facility]    PHACOEMULSIFICATION-ASPIRATION-CATARACT Left 3/22/2018    [facility]    SIGMOIDOSCOPY, FLEXIBLE N/A 11/7/2013    [facility] by [facility] at [facility] (4TH FLR)    TOTAL ABDOMINAL HYSTERECTOMY  1969    TAHBSO    TOTAL KNEE ARTHROPLASTY  9/13/2012    Left     Social History     Socioeconomic History    Marital status: Single     Spouse name: Not on file    Number of children: 5    Years of education: Not on file    Highest education level: Not on file   Occupational History    Not on file   Social Needs    Financial resource strain: Not on file    Food insecurity:     Worry: Not on file     Inability: Not on file    Transportation needs:     Medical: Not on file     Non-medical: Not on file   Tobacco Use    Smoking status: Former Smoker     Packs/day: 1.00     Years: 15.00     Pack years: 15.00     Types: Cigarettes     Last attempt to quit: 7/24/2000     Years  since quittin.0    Smokeless tobacco: Never Used   Substance and Sexual Activity    Alcohol use: No    Drug use: No    Sexual activity: Never   Lifestyle    Physical activity:     Days per week: Not on file     Minutes per session: Not on file    Stress: Not on file   Relationships    Social connections:     Talks on phone: Not on file     Gets together: Not on file     Attends [employer] service: Not on file     Active member of club or organization: Not on file     Attends meetings of clubs or organizations: Not on file     Relationship status: Not on file   Other Topics Concern    Are you pregnant or think you may be? Not Asked    Breast-feeding Not Asked   Social History Narrative    Single with 5 children. Lives alone. Retired cook.     Family History   Problem Relation Age of Onset    Sleep apnea Sister     Diabetes Sister     Cancer Mother         brain tumor    Hypertension Mother     Heart disease Father     Heart attack Father     Dementia Brother     Lupus Brother     Frontotemporal dementia Brother     No Known Problems Daughter     No Known Problems Son     Diabetes Sister     Lupus Sister     Breast cancer Sister     Diabetes Sister     Cancer Sister         breast cancer    Heart attack Sister     Diabetes Sister     Liver cancer Brother     Drug abuse Brother     Alcohol abuse Brother     Cirrhosis Brother     Drug abuse Brother     No Known Problems Daughter     No Known Problems Son     No Known Problems Son     No Known Problems Maternal Grandmother     No Known Problems Maternal Grandfather     No Known Problems Paternal Grandmother     No Known Problems Paternal Grandfather     Diabetes Brother     Dementia Brother     Diabetes Other     Ovarian cancer Other         Niece     Breast cancer Maternal Aunt     No Known Problems Maternal Uncle     No Known Problems Paternal Aunt     No Known Problems Paternal Uncle     Glaucoma Neg Hx      "Blindness Neg Hx     Celiac disease Neg Hx     Colon cancer Neg Hx     Colon polyps Neg Hx     Esophageal cancer Neg Hx     Inflammatory bowel disease Neg Hx     Liver disease Neg Hx     Rectal cancer Neg Hx     Stomach cancer Neg Hx     Ulcerative colitis Neg Hx     Melanoma Neg Hx     Multiple sclerosis Neg Hx     Psoriasis Neg Hx     Amblyopia Neg Hx     Cataracts Neg Hx     Macular degeneration Neg Hx     Retinal detachment Neg Hx     Strabismus Neg Hx     Stroke Neg Hx     Thyroid disease Neg Hx        Objective:       Vitals:    07/29/19 1608 07/29/19 1640   BP: (!) 192/90 (!) 160/88   Pulse: 76    Temp: 98 °F (36.7 °C)    SpO2: 98%    Weight: 98.8 kg (217 lb 13 oz)    Height: 5' 6" (1.676 m)    PainSc:   8    PainLoc: Knee        Body mass index is 35.16 kg/m².    Physical Exam   Constitutional: She is oriented to person, place, and time. She appears well-developed and well-nourished.   obese   Eyes: Pupils are equal, round, and reactive to light. Conjunctivae and EOM are normal.   Neck: Neck supple. No JVD present.   Cardiovascular: Normal rate, regular rhythm, normal heart sounds and intact distal pulses.   Pulmonary/Chest: Effort normal and breath sounds normal.   Abdominal: Soft. Bowel sounds are normal. She exhibits distension. There is tenderness in the right upper quadrant. There is no rebound, no guarding and no CVA tenderness.   Round    obese   Musculoskeletal: Normal range of motion. She exhibits tenderness. She exhibits no edema or deformity.   Right knee and lower leg pain   Neurological: She is alert and oriented to person, place, and time.   Skin: Skin is warm and dry. Capillary refill takes less than 2 seconds.   Psychiatric: She has a normal mood and affect. Her behavior is normal. Judgment and thought content normal.   Nursing note and vitals reviewed.      Assessment:       1. Leg edema, right    2. Right leg pain    3. BMI 35.0-35.9,adult    4. Obesity (BMI 35.0-39.9 " without comorbidity)    5. Primary osteoarthritis of right knee    6. Nausea    7. Epigastric abdominal pain        Plan:       Henrietta was seen today for knee pain, abdominal pain and headache.    Diagnoses and all orders for this visit:    Leg edema, right  Right leg pain  Schedule Vascular US of right leg per Pain and Spine clinic, rule out DVT. Recommend you follow plan of care per Spine/Pain Clinic, they have ordered this and suggested an injection that has been scheduled see upcoming appts below.    May try Tylenol otc as needed for pain    Elevate legs, do not sit with them prone long periods, move more.    BMI 35.0-35.9,adult  BMI reviewed    Obesity (BMI 35.0-39.9 without comorbidity)  BMI reviewed.    Diet and exercise to lose weight.    Primary osteoarthritis of right knee    Nausea  -     US Abdomen Complete  -     ondansetron (ZOFRAN) 4 MG tablet; Take 1 tablet (4 mg total) by mouth every 8 (eight) hours as needed for Nausea.    Epigastric abdominal pain  -     US Abdomen Complete  -     ondansetron (ZOFRAN) 4 MG tablet; Take 1 tablet (4 mg total) by mouth every 8 (eight) hours as needed for Nausea.    Follow up if symptoms worsen or fail to improve.

## 2019-07-29 NOTE — TELEPHONE ENCOUNTER
Spoke with pt she stated she has been sick for 2 weeks with leg, stomach pains and headaches. Pt stated she has an appt with Dr. Arriaga on Wednesday, but can't wait until then, scheduled pt to see Emilia Herbert on today at 4:30.Camryn Rosenberg LPN

## 2019-07-31 ENCOUNTER — OFFICE VISIT (OUTPATIENT)
Dept: INTERNAL MEDICINE | Facility: CLINIC | Age: 79
End: 2019-07-31
Payer: MEDICARE

## 2019-07-31 VITALS
WEIGHT: 220.81 LBS | HEIGHT: 68 IN | BODY MASS INDEX: 33.47 KG/M2 | HEART RATE: 73 BPM | SYSTOLIC BLOOD PRESSURE: 132 MMHG | DIASTOLIC BLOOD PRESSURE: 80 MMHG | OXYGEN SATURATION: 96 % | TEMPERATURE: 98 F

## 2019-07-31 DIAGNOSIS — K31.7 GASTRIC POLYP: Primary | ICD-10-CM

## 2019-07-31 DIAGNOSIS — I10 ESSENTIAL HYPERTENSION: Chronic | ICD-10-CM

## 2019-07-31 DIAGNOSIS — Z79.4 TYPE 2 DIABETES MELLITUS WITH STAGE 3 CHRONIC KIDNEY DISEASE, WITH LONG-TERM CURRENT USE OF INSULIN: ICD-10-CM

## 2019-07-31 DIAGNOSIS — N18.30 TYPE 2 DIABETES MELLITUS WITH STAGE 3 CHRONIC KIDNEY DISEASE, WITH LONG-TERM CURRENT USE OF INSULIN: ICD-10-CM

## 2019-07-31 DIAGNOSIS — E11.22 TYPE 2 DIABETES MELLITUS WITH STAGE 3 CHRONIC KIDNEY DISEASE, WITH LONG-TERM CURRENT USE OF INSULIN: ICD-10-CM

## 2019-07-31 DIAGNOSIS — G47.33 OSA ON CPAP: Chronic | ICD-10-CM

## 2019-07-31 DIAGNOSIS — G89.29 OTHER CHRONIC BACK PAIN: Chronic | ICD-10-CM

## 2019-07-31 DIAGNOSIS — M17.11 PRIMARY OSTEOARTHRITIS OF RIGHT KNEE: ICD-10-CM

## 2019-07-31 DIAGNOSIS — M54.9 OTHER CHRONIC BACK PAIN: Chronic | ICD-10-CM

## 2019-07-31 DIAGNOSIS — E55.9 HYPOVITAMINOSIS D: ICD-10-CM

## 2019-07-31 PROCEDURE — 3075F PR MOST RECENT SYSTOLIC BLOOD PRESS GE 130-139MM HG: ICD-10-PCS | Mod: HCNC,CPTII,S$GLB, | Performed by: INTERNAL MEDICINE

## 2019-07-31 PROCEDURE — 99214 OFFICE O/P EST MOD 30 MIN: CPT | Mod: HCNC,S$GLB,, | Performed by: INTERNAL MEDICINE

## 2019-07-31 PROCEDURE — 3079F DIAST BP 80-89 MM HG: CPT | Mod: HCNC,CPTII,S$GLB, | Performed by: INTERNAL MEDICINE

## 2019-07-31 PROCEDURE — 3079F PR MOST RECENT DIASTOLIC BLOOD PRESSURE 80-89 MM HG: ICD-10-PCS | Mod: HCNC,CPTII,S$GLB, | Performed by: INTERNAL MEDICINE

## 2019-07-31 PROCEDURE — 99214 PR OFFICE/OUTPT VISIT, EST, LEVL IV, 30-39 MIN: ICD-10-PCS | Mod: HCNC,S$GLB,, | Performed by: INTERNAL MEDICINE

## 2019-07-31 PROCEDURE — 1101F PT FALLS ASSESS-DOCD LE1/YR: CPT | Mod: HCNC,CPTII,S$GLB, | Performed by: INTERNAL MEDICINE

## 2019-07-31 PROCEDURE — 1101F PR PT FALLS ASSESS DOC 0-1 FALLS W/OUT INJ PAST YR: ICD-10-PCS | Mod: HCNC,CPTII,S$GLB, | Performed by: INTERNAL MEDICINE

## 2019-07-31 PROCEDURE — 99999 PR PBB SHADOW E&M-EST. PATIENT-LVL III: ICD-10-PCS | Mod: PBBFAC,HCNC,, | Performed by: INTERNAL MEDICINE

## 2019-07-31 PROCEDURE — 99999 PR PBB SHADOW E&M-EST. PATIENT-LVL III: CPT | Mod: PBBFAC,HCNC,, | Performed by: INTERNAL MEDICINE

## 2019-07-31 PROCEDURE — 3075F SYST BP GE 130 - 139MM HG: CPT | Mod: HCNC,CPTII,S$GLB, | Performed by: INTERNAL MEDICINE

## 2019-07-31 RX ORDER — PANTOPRAZOLE SODIUM 40 MG/1
TABLET, DELAYED RELEASE ORAL
Qty: 180 TABLET | Refills: 4 | Status: SHIPPED | OUTPATIENT
Start: 2019-07-31 | End: 2020-01-08 | Stop reason: SDUPTHER

## 2019-07-31 RX ORDER — FUROSEMIDE 40 MG/1
TABLET ORAL
Qty: 180 TABLET | Refills: 4 | Status: SHIPPED | OUTPATIENT
Start: 2019-07-31 | End: 2020-01-08 | Stop reason: SDUPTHER

## 2019-07-31 NOTE — PROGRESS NOTES
CHIEF COMPLAINT: Follow up of multiple issues     HISTORY OF PRESENT ILLNESS: This is a 79-year-old woman who presents for follow up of above    She has been having right leg pain for the last 2 weeks. She has intermittent swelling in the right leg.  The swelling resolved the last 2 days.  No chest pain or shortness of breath. She has right knee pain and right knee swelling. She has advanced arthritis in the right knee on xray Jan 2016. She had an injection in the right knee 1/24/19    She has been out of furosemide 40 mg twice daily for the last 4 days and has been out of pantoprazole 40 mg twice daily. SHe reports that food is getting stuck in her throat.        Abdominal pain  Comes and goes. She has a cramping sensation.  She took a course of Augmentin which helped the pain. .  No vomiting, constipation, diarrhea.  Occasional soft or watery stools.  No melana or bloody stool, fever, chills. Cough is better.       Her left ankle is better. She is taking allopurinol 100 mg daily for gout.  She had some right shoulder pain that lasted several days and has resolved.     She continues to take coreg 25 mg twice daily for her hypertension.      She is now taking duloxetine 60 mg twice daily. Mood is still not good.      She takes tizanidine 4 mg at bedtime to help with muscle spasm of her back. She continues to have back pain. She is taking Oxycodone apap 10/325 3-5 times daily which helps her back. She had to stop physcial therapy due to her foot and ankle pain.        She is taking miralax 1 capful daily as needed ( has not needed)      She is taking Lantus 60 units in the evening and Novolog 23 units once or twice daily with meals.  No hypoglycemia.  Averages 130-140. No hypoglycemia     PAST MEDICAL HISTORY:   1. Diabetes mellitus   2. Hypertension   3. Hyperlipidemia   4. Left heart catheterization January 2007 which revealed luminal irregularities in the LAD, left circumflex and right coronary artery. She had  diastolic dysfunction and patent renal arteries.   5. Sleep apnea   6. Obesity.   7. Nephrolithiasis status post lithotripsy.   8. Reflux - had nonerosive gastropathy on EGD September 2007. Gastritis on EGD 9/2010  9. Hidradenitis suppurativa.   10. History of diverticulitis with a hospitalized October 2007.   11. Migraines   12. Obesity.   13. 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    14. Total hysterectomy in 1969.   15. Cholecystectomy was done at that time as well.   16. 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   17. Colon polyps on colonscopy 11/2010 - due 2013   18. Hospitalization 12/10 for e coli sepsis due to left ureteral stone with left nephrostomy tube in [place], [place]   19. Left knee replacement 9/2012      MEDICATIONS and ALLERGIES: Updated on EPIC.     PHYSICAL EXAMINATION:      GENERAL: She is alert, oriented, no apparent distress. Affect within   normal limits.  Conjunctiva anicteric. . Oropharynx clear.   NECK: Supple.   Respiratory: Effort normal. Lungs clear  HEART: Regular rate and rhythm without murmurs, gallops or rubs.   No lower extremity edema.    ABDOMEN: soft, non distended, bowel sounds present, no hepatosplenomgaly. Left lower quadrant tenderness. No rebound or guarding  .            ASSESSMENT AND PLAN:   1. Right leg pain - suspect due to OA knee - to ortho for injection.   2. Abdominal pain - due to diverticulitis better after completing augmentin. Get back on pantoprazole.   2. Gout - on allopurinol 100 mg daily.   2.  Mood disorder -  duloxetine to 60 mg twice daily  3. neck, shoulder and back pain -follow up with [facility]. To have epidural  4. Asthma - stable   6. HTN -stable  7. Gerd -get back pantoprazole 40 mg twice daily - EGD - due to history of gastri polyps on EGD 11/2015  8. Diabetes with neuropathy- watch sugars  9. Hyperlipidemia - off lipitor  10. Allergic rhinitis -  stable  11. Diastolic congestive heart failure - decrease fluid pill to once daily unless swelling. .    12. CRI -stable  13. Obesity - discussed diet, exercise and weight loss  14. CAD -risk factor modification  15. Aortic atherosclerosis and possible mesenteric artery stenosis- risk factor modification  16. Tortuous aorta - HTN controlled  17. Colon polyps - Colonoscopy in 8/2013 - 2 polyps. Flex sig 11/2013 - mild granular mucosa.  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 . Attempted colonoscopy 11/2016 du to diarrhea which has resolved. She had poor prep with hyperplastic rectal biopsy.  Colonoscopy 6/2017 diverticulosis - no polyps due 2023.   MMG 5/19  Prevnar 10/15 and flu shot 11/16  I will see her back in 4 weeks sooner if issues. .

## 2019-08-07 RX ORDER — OXYCODONE AND ACETAMINOPHEN 10; 325 MG/1; MG/1
TABLET ORAL
Qty: 150 TABLET | Refills: 0 | Status: SHIPPED | OUTPATIENT
Start: 2019-08-07 | End: 2019-09-10 | Stop reason: SDUPTHER

## 2019-08-07 NOTE — TELEPHONE ENCOUNTER
----- Message from Keyla Verduzco sent at 8/7/2019  9:22 AM CDT -----  Contact: self/[phone]  Pt called in regard to getting a Rx refill for     oxycodone-acetaminophen (PERCOCET)  mg per tablet 150 tablet 0 7/8/2019  No  Take one tablet by mouth 5 times daily    Pt would like a call once it is done.    [facility]  Please advise

## 2019-08-28 ENCOUNTER — HOSPITAL ENCOUNTER (OUTPATIENT)
Dept: RADIOLOGY | Facility: OTHER | Age: 79
Discharge: HOME | End: 2019-08-28
Attending: NURSE PRACTITIONER
Payer: MEDICARE

## 2019-08-28 ENCOUNTER — TELEPHONE (OUTPATIENT)
Dept: INTERNAL MEDICINE | Facility: CLINIC | Age: 79
End: 2019-08-28

## 2019-08-28 ENCOUNTER — OFFICE VISIT (OUTPATIENT)
Dept: SPINE | Facility: CLINIC | Age: 79
End: 2019-08-28
Attending: ANESTHESIOLOGY
Payer: MEDICARE

## 2019-08-28 VITALS
BODY MASS INDEX: 32.4 KG/M2 | DIASTOLIC BLOOD PRESSURE: 96 MMHG | TEMPERATURE: 98 F | HEART RATE: 65 BPM | HEIGHT: 68 IN | WEIGHT: 213.81 LBS | SYSTOLIC BLOOD PRESSURE: 193 MMHG | RESPIRATION RATE: 18 BRPM

## 2019-08-28 DIAGNOSIS — M48.062 SPINAL STENOSIS OF LUMBAR REGION WITH NEUROGENIC CLAUDICATION: Primary | ICD-10-CM

## 2019-08-28 DIAGNOSIS — M51.36 DDD (DEGENERATIVE DISC DISEASE), LUMBAR: ICD-10-CM

## 2019-08-28 DIAGNOSIS — M47.816 LUMBAR SPONDYLOSIS: ICD-10-CM

## 2019-08-28 PROCEDURE — 99204 OFFICE O/P NEW MOD 45 MIN: CPT | Mod: HCNC,S$GLB,, | Performed by: ANESTHESIOLOGY

## 2019-08-28 PROCEDURE — 76700 US EXAM ABDOM COMPLETE: CPT | Mod: TC,HCNC

## 2019-08-28 PROCEDURE — 76700 US ABDOMEN COMPLETE: ICD-10-PCS | Mod: 26,HCNC,, | Performed by: RADIOLOGY

## 2019-08-28 PROCEDURE — 99999 PR PBB SHADOW E&M-EST. PATIENT-LVL IV: ICD-10-PCS | Mod: PBBFAC,HCNC,, | Performed by: ANESTHESIOLOGY

## 2019-08-28 PROCEDURE — 76700 US EXAM ABDOM COMPLETE: CPT | Mod: 26,HCNC,, | Performed by: RADIOLOGY

## 2019-08-28 PROCEDURE — 3077F SYST BP >= 140 MM HG: CPT | Mod: HCNC,CPTII,S$GLB, | Performed by: ANESTHESIOLOGY

## 2019-08-28 PROCEDURE — 3080F PR MOST RECENT DIASTOLIC BLOOD PRESSURE >= 90 MM HG: ICD-10-PCS | Mod: HCNC,CPTII,S$GLB, | Performed by: ANESTHESIOLOGY

## 2019-08-28 PROCEDURE — 3077F PR MOST RECENT SYSTOLIC BLOOD PRESSURE >= 140 MM HG: ICD-10-PCS | Mod: HCNC,CPTII,S$GLB, | Performed by: ANESTHESIOLOGY

## 2019-08-28 PROCEDURE — 99204 PR OFFICE/OUTPT VISIT, NEW, LEVL IV, 45-59 MIN: ICD-10-PCS | Mod: HCNC,S$GLB,, | Performed by: ANESTHESIOLOGY

## 2019-08-28 PROCEDURE — 99499 UNLISTED E&M SERVICE: CPT | Mod: HCNC,S$GLB,, | Performed by: ANESTHESIOLOGY

## 2019-08-28 PROCEDURE — 1101F PR PT FALLS ASSESS DOC 0-1 FALLS W/OUT INJ PAST YR: ICD-10-PCS | Mod: HCNC,CPTII,S$GLB, | Performed by: ANESTHESIOLOGY

## 2019-08-28 PROCEDURE — 3080F DIAST BP >= 90 MM HG: CPT | Mod: HCNC,CPTII,S$GLB, | Performed by: ANESTHESIOLOGY

## 2019-08-28 PROCEDURE — 99499 RISK ADDL DX/OHS AUDIT: ICD-10-PCS | Mod: HCNC,S$GLB,, | Performed by: ANESTHESIOLOGY

## 2019-08-28 PROCEDURE — 99999 PR PBB SHADOW E&M-EST. PATIENT-LVL IV: CPT | Mod: PBBFAC,HCNC,, | Performed by: ANESTHESIOLOGY

## 2019-08-28 PROCEDURE — 1101F PT FALLS ASSESS-DOCD LE1/YR: CPT | Mod: HCNC,CPTII,S$GLB, | Performed by: ANESTHESIOLOGY

## 2019-08-28 NOTE — PROGRESS NOTES
Chronic Pain - New Consult    Referring Physician: Lizy Le, *    Chief Complaint:   Chief Complaint   Patient presents with    Low-back Pain        SUBJECTIVE:    Henrietta Villasenor presents to the clinic for the evaluation of low back pain. The pain started and symptoms have been worsening.The pain is located in the low back area and radiates to the bilateral leg swelling.  The pain is described as sharp, shooting, throbbing, tingling and constant and is rated as 5/10. The pain is rated with a score of  5/10 on the BEST day and a score of 8/10 on the WORST day.  Symptoms interfere with daily activity and sleeping. The pain is exacerbated by Standing, Bending, Coughing/Sneezing, Walking, Morning, Lifting and Getting out of bed/chair.  The pain is mitigated by medications. She reports spending 0 hours per day reclining. The patient reports 2 hours of uninterrupted sleep per night.    Patient denies night fever/night sweats, urinary incontinence, bowel incontinence, significant weight loss, significant motor weakness and loss of sensations.    Physical Therapy/Home Exercise: yes      Pain Disability Index Review:  Last 3 PDI Scores 2019   Pain Disability Index (PDI) 49       Pain Medications:    - Opioids: Percocet (Oxycodone/Acetaminophen)  - Adjuvant Medications: Cymbalta ( Duloxetine) and Tizanidine  - Anti-Coagulants: None  - Others: None     report:  Reviewed and consistent with medication use as prescribed.    Pain Procedures: none    Imagin18 MRI Lumbar  Narrative     EXAMINATION:  MRI LUMBAR SPINE WITHOUT CONTRAST    CLINICAL HISTORY:  low back and right leg pain; Other intervertebral disc degeneration, lumbar region    TECHNIQUE:  Multiplanar, multisequence MR images were acquired from the thoracolumbar junction to the sacrum without the administration of contrast.    COMPARISON:  Radiograph 2018    FINDINGS:  Alignment: Normal.    Vertebrae: Normal marrow signal. No  fracture.    Discs: Normal height and signal.    Cord: Normal.  Conus terminates at superior border of L1    Degenerative findings:    T12-L1: Mild broad-based disc bulge without compressive sequelae.    L1-L2: Unremarkable.    L2-L3: Mild broad-based disc bulge with facet arthropathy.  No compressive sequelae.    L3-L4: Broad-based disc bulge and facet arthropathy.  There is mild bilateral neural foraminal narrowing.    L4-L5: Trace anterolisthesis of L4 on L5 with broad-based disc bulge and facet arthropathy.  This results in moderate bilateral neural foraminal narrowing.  Moderate canal stenosis.  There is also significant narrowing of the left greater than right lateral recesses.    L5-S1: Broad-based disc bulge without significant compressive sequelae.    Paraspinal muscles & soft tissues: Paraspinal atrophy.  No concerning findings in the visualized abdomen.  Add      Impression       Moderate degenerative change at L4-5.  Milder degenerative changes elsewhere in the lumbar spine.     6/5/18 Xray Lumbar  Narrative     EXAMINATION:  XR LUMBAR SPINE 5 VIEW WITH FLEX AND EXT    CLINICAL HISTORY:  low back and right leg pain;  Other intervertebral disc degeneration, lumbar region    TECHNIQUE:  Five views of the lumbar spine plus flexion extension views were performed.    COMPARISON:  02/03/2015    FINDINGS:  The lumbar vertebra are intact.  No compression fracture is identified.  Degenerative changes are again seen with small osteophytes at multiple levels.  L4-5 disc space is mildly narrowed.  Degenerative changes are also noted at lower facet joints.  There is associated grade 1 spondylolisthesis at L4-5 which is stable between flexion and extension at about 6 mm.  Alignment is otherwise satisfactory.    Visualized abdomen shows aortic atherosclerosis and surgical clips in the right upper quadrant.      Impression       No acute abnormality.  Chronic degenerative spine changes.           Past Medical History:    Diagnosis Date    Abnormality of lung 11/08/2011    Stable bandlike opacities at the lung bases, most likely representing      Anxiety     Arthritis     CAD (coronary artery disease)     Calculus of ureter 2/22/2011    Cataract     CHF (congestive heart failure)     Chronic back pain 7/29/2012    Colon polyp 9/2010; 11/2010    Colon polyps 7/29/2012    Coronary artery disease involving native coronary artery of native heart with angina pectoris     Depression     Diabetes mellitus     Diabetes mellitus type II     Diverticulitis large intestine 7/27/2015    Diverticulosis 09/25/2010; 11/02/2010; 11/08/2011; 7/29/2012    Duodenal disorder 08/25/2011    Duodenal erosion noted on EGD.    Duodenal ulcer, unspecified as acute or chronic, without hemorrhage, perforation, or obstruction 8/24/2011    E. coli sepsis 12/2010    Due to left ureteral stone with left nephrostomy tube - hospitalized in [place]    Esophageal dysmotility 01/24/2012    Noted on upper GI-barium swallow.    Facial weakness 1969    Left facial weakness s/p left mastoidectomy in ~ 1969.    Fatty liver 11/08/2011    Reported on CT-abdomen and in 06/2012 Gastro clinic visit note.    Gastric polyp 09/29/2010    GERD (gastroesophageal reflux disease) 7/29/2012    Hepatomegaly 11/08/2011    Reported on CT-abdomen    Herpes zoster with other nervous system complications(053.19) 2/28/2011    Hiatal hernia 06/26/2006; 09/29/2010; 08/25/2011    Noted on barium swallow 2006; noted on  EGD 2011.   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    Trouble in sleeping     Type II or unspecified type diabetes mellitus with neurological manifestations, not stated as uncontrolled(250.60)     Type II or unspecified type diabetes mellitus with peripheral circulatory disorders, not stated as uncontrolled(250.70)     Type II or unspecified type diabetes mellitus with renal manifestations, not stated as uncontrolled(250.40)      Past Surgical History:   Procedure Laterality Date    BELPHAROPTOSIS REPAIR      s/p LAMBERTO levator repair - Dr. Dejesus    CARDIAC CATHETERIZATION      CARPAL TUNNEL RELEASE  3/13/2012    CATARACT EXTRACTION      CHOLECYSTECTOMY      COLONOSCOPY N/A 6/14/2017    [facility]formed by [facility] at [facility] (4TH FLR)    COLONOSCOPY N/A 11/16/2016    [facility] at [facility] ([facility])    COLONOSCOPY N/A 8/8/2013    [facility] at [facility] ([facility])    EGD (ESOPHAGOGASTRODUODENOSCOPY) N/A 4/30/2014    [facility] (4[facility] FLR)    EGD (ESOPHAGOGASTRODUODENOSCOPY) N/A 8/8/2013    [facility] (4[facility]LR)    ESOPHAGOGASTRODUODENOSCOPY (EGD) N/A 11/11/2015    [facility] at [facility] (4[facility]LR)    EXTRACORPOREAL SHOCK WAVE LITHOTRIPSY      INSERTION-INTRAOCULAR LENS (IOL) Right 10/31/2018    [facility]    INSERTION-INTRAOCULAR LENS (IOL) Left 3/22/2018    [facility] at [facility] OR 1ST FLR    JOINT REPLACEMENT      mastoid tumor removal  1969    Left mastoidectomy with residual left facial weakness.    NEPHROSTOMY      OOPHORECTOMY       PHACOEMULSIFICATION-ASPIRATION-CATARACT Right 10/31/2018    [facility]    PHACOEMULSIFICATION-ASPIRATION-CATARACT Left 3/22/2018    [facility] FLR    SIGMOIDOSCOPY, FLEXIBLE N/A 2013    [facility] (4TH FLR)    TOTAL ABDOMINAL HYSTERECTOMY  1969    TAHBSO    TOTAL KNEE ARTHROPLASTY  2012    Left     Social History     Socioeconomic History    Marital status: Single     Spouse name: Not on file    Number of children: 5    Years of education: Not on file    Highest education level: Not on file   Occupational History    Not on file   Social Needs    Financial resource strain: Not on file    Food insecurity:     Worry: Not on file     Inability: Not on file    Transportation needs:     Medical: Not on file     Non-medical: Not on file   Tobacco Use    Smoking status: Former Smoker     Packs/day: 1.00     Years: 15.00     Pack years: 15.00     Types: Cigarettes     Last attempt to quit: 2000     Years since quittin.1    Smokeless tobacco: Never Used   Substance and Sexual Activity    Alcohol use: No    Drug use: No    Sexual activity: Never   Lifestyle    Physical activity:     Days per week: Not on file     Minutes per session: Not on file    Stress: Not on file   Relationships    Social connections:     Talks on phone: Not on file     Gets together: Not on file     Attends [employer] service: Not on file     Active member of club or organization: Not on file     Attends meetings of clubs or organizations: Not on file     Relationship status: Not on file   Other Topics Concern    Are you pregnant or think you may be? Not Asked    Breast-feeding Not Asked   Social History Narrative    Single with 5 children. Lives alone. Retired cook.     Family History   Problem Relation Age of Onset    Sleep apnea Sister     Diabetes Sister     Cancer Mother         brain tumor     Hypertension Mother     Heart disease Father     Heart attack Father     Dementia Brother     Lupus Brother     Frontotemporal dementia Brother     No Known Problems Daughter     No Known Problems Son     Diabetes Sister     Lupus Sister     Breast cancer Sister     Diabetes Sister     Cancer Sister         breast cancer    Heart attack Sister     Diabetes Sister     Liver cancer Brother     Drug abuse Brother     Alcohol abuse Brother     Cirrhosis Brother     Drug abuse Brother     No Known Problems Daughter     No Known Problems Son     No Known Problems Son     No Known Problems Maternal Grandmother     No Known Problems Maternal Grandfather     No Known Problems Paternal Grandmother     No Known Problems Paternal Grandfather     Diabetes Brother     Dementia Brother     Diabetes Other     Ovarian cancer Other         Niece     Breast cancer Maternal Aunt     No Known Problems Maternal Uncle     No Known Problems Paternal Aunt     No Known Problems Paternal Uncle     Glaucoma Neg Hx     Blindness Neg Hx     Celiac disease Neg Hx     Colon cancer Neg Hx     Colon polyps Neg Hx     Esophageal cancer Neg Hx     Inflammatory bowel disease Neg Hx     Liver disease Neg Hx     Rectal cancer Neg Hx     Stomach cancer Neg Hx     Ulcerative colitis Neg Hx     Melanoma Neg Hx     Multiple sclerosis Neg Hx     Psoriasis Neg Hx     Amblyopia Neg Hx     Cataracts Neg Hx     Macular degeneration Neg Hx     Retinal detachment Neg Hx     Strabismus Neg Hx     Stroke Neg Hx     Thyroid disease Neg Hx        Review of patient's allergies indicates:   Allergen Reactions    Crestor [rosuvastatin]      Cramping in legs    Ezetimibe      Other reaction(s): abdominal pain, Diarrhea  Other reaction(s): abdominal pain    Hydrocodone      Other reaction(s): Itching    Lisinopril      Other reaction(s): cough      Sulfa (sulfonamide antibiotics)      Itching and Rash       "Sulfamethoxazole     Sulfamethoxazole-trimethoprim     Trimethoprim     Valsartan      Other reaction(s): Angioedema       Current Outpatient Medications   Medication Sig    ACCU-CHEK FASTCLIX Misc     ACCU-CHEK SMARTVIEW TEST STRIP Strp TEST THREE TIMES DAILY    albuterol (VENTOLIN HFA) 90 mcg/actuation inhaler Inhale 2 puffs by mouth into the lungs every 4 (four) hours as needed for Wheezing. Rescue    ALCOHOL ANTISEPTIC PADS (ALCOHOL PREP PADS TOP)     allopurinol (ZYLOPRIM) 100 MG tablet Take 1 tablet (100 mg total) by mouth once daily.    aspirin (ECOTRIN) 81 MG EC tablet Take 1 tablet by mouth Daily.    azelastine (ASTELIN) 137 mcg (0.1 %) nasal spray Spray 1 spray (137 mcg total) by Nasal route 2 (two) times daily.    BD ALCOHOL SWABS PadM USE  WHEN  TESTING BLOOD SUGAR FOUR TIMES DAILY    BD ULTRA-FINE OLU PEN NEEDLES 32 x 5/32 " Ndle Uses 4 times daily, on multiple daily insulin injections    carbamide peroxide (EAR WAX REMOVAL DROPS OTIC)     carvedilol (COREG) 25 MG tablet TAKE 1 TABLET TWICE DAILY    cholecalciferol, vitamin D3, (VITAMIN D3) 400 unit Cap Take 400 Units by mouth once daily.    DULoxetine (CYMBALTA) 60 MG capsule Take 1 capsule (60 mg total) by mouth 2 (two) times daily.    fluticasone (FLONASE) 50 mcg/actuation nasal spray SHAKE LQ AND U 2 SPRAYS IEN QD    furosemide (LASIX) 40 MG tablet TAKE 1 TABLET BY MOUTH TWICE DAILY    insulin aspart (NOVOLOG) 100 unit/mL InPn pen 23 units with meals plus correction scale. Max daily dose=120 units (Patient taking differently: Inject 23 Units into the skin 3 (three) times daily with meals. 23 units with meals plus correction scale. Max daily dose=120 units)    Lactobacillus acidophilus (PROBIOTIC) 10 billion cell Cap Take 1 capsule by mouth once daily.    LANTUS SOLOSTAR U-100 INSULIN glargine 100 units/mL (3mL) SubQ pen INJECT 60 UNITS INTO THE SKIN EVERY EVENING.    loratadine (CLARITIN) 10 mg tablet TAKE 1 TABLET EVERY DAY " "   MUSCLE RUB, WITH CAMPHOR, 4-30-10 % Crea     nitroGLYCERIN (NITROSTAT) 0.4 MG SL tablet Place 1 tablet (0.4 mg total) under the tongue every 5 (five) minutes as needed.    olopatadine (PATADAY) 0.2 % Drop Place 1 drop into both eyes once daily.    ondansetron (ZOFRAN) 4 MG tablet Take 1 tablet (4 mg total) by mouth every 8 (eight) hours as needed for Nausea.    oxyCODONE-acetaminophen (PERCOCET)  mg per tablet Take one tablet by mouth 5 times daily    pantoprazole (PROTONIX) 40 MG tablet TAKE 1 TABLET(40 MG) BY MOUTH twice daily    pen needle, diabetic (SURE-FINE PEN NEEDLES) 29 gauge x 1/2" Ndle Use 4 times daily    potassium chloride SA (K-DUR,KLOR-CON) 10 MEQ tablet Take 1 tablet (10 mEq total) by mouth 2 (two) times daily.    promethazine-dextromethorphan (PROMETHAZINE-DM) 6.25-15 mg/5 mL Syrp 2 tsp every 8 hours as needed for cough    tiZANidine (ZANAFLEX) 4 MG tablet Take 0.5-1 tablets (2-4 mg total) by mouth every 8 (eight) hours.    TRUEPLUS LANCETS 33 gauge Misc TEST BLOOD SUGAR FOUR TIMES DAILY    triamcinolone acetonide 0.1% (KENALOG) 0.1 % cream Apply topically 3 (three) times daily.     No current facility-administered medications for this visit.      Facility-Administered Medications Ordered in Other Visits   Medication    tetracaine HCl (PF) 0.5 % Drop 1 drop       REVIEW OF SYSTEMS:    GENERAL:  No weight loss, malaise or fevers.  HEENT:  Negative for frequent or significant headaches.  NECK:  Negative for lumps, goiter, pain and significant neck swelling.  RESPIRATORY:  Negative for cough, wheezing or shortness of breath. +MARLON, SOB  CARDIOVASCULAR:  Negative for chest pain, leg swelling or palpitations.   GI:  Negative for abdominal discomfort, blood in stools or black stools or change in bowel habits. +GERD  MUSCULOSKELETAL:  Lower back and joint pain  SKIN:  Negative for lesions, rash, and itching.  PSYCH:  Negative for sleep disturbance, mood disorder and recent psychosocial " "stressors.  HEMATOLOGY/LYMPHOLOGY:  Negative for prolonged bleeding, bruising easily or swollen nodes. +Anemia  NEURO:   No history of headaches, syncope, paralysis, seizures or tremors.  All other reviewed and negative other than HPI.    OBJECTIVE:    BP (!) 193/96   Pulse 65   Temp 98.3 °F (36.8 °C) (Oral)   Resp 18   Ht 5' 8" (1.727 m)   Wt 97 kg (213 lb 12.8 oz)   LMP  (LMP Unknown)   BMI 32.51 kg/m²     PHYSICAL EXAMINATION:    General appearance: Well appearing, in no acute distress, alert and oriented x3.  Psych:  Mood and affect appropriate.  Skin: Skin color, texture, turgor normal, no rashes or lesions, in both upper and lower body.  Head/face:  Normocephalic, atraumatic. No palpable lymph nodes.  Neck: No pain to palpation over the cervical paraspinous muscles. Spurling Negative. No pain with neck flexion, extension, or lateral flexion.   Cor: RRR  Pulm: CTA  GI:  Soft and non-tender.  Back: Straight leg raising in the sitting and supine positions is negative to radicular pain. Positive tenderness to palpation over bilateral lumbar paraspinal muscles.  Limited range of motion of lumbar spine.   Extremities: Peripheral joint ROM is full and pain free without obvious instability or laxity in all four extremities. No deformities, edema, or skin discoloration. Good capillary refill.  Musculoskeletal: Shoulder, hip, sacroiliac and knee provocative maneuvers are negative. Bilateral upper and lower extremity strength is normal and symmetric.  No atrophy or tone abnormalities are noted.  Neuro: Bilateral upper and lower extremity coordination and muscle stretch reflexes are physiologic and symmetric.  Plantar response are downgoing. No loss of sensation is noted.  Gait: Antalgic.    ASSESSMENT: 79 y.o. year old female with chronic lower back pain with radiation to bilateral lower extremities to the calf level consistent with lumbar radiculopathy.  She has tried medication management, physical therapy and " healthy back program in the past.  We will refer her to physical therapy and schedule her for bilateral L4/L5 transforaminal epidural steroid injection.    1. Spinal stenosis of lumbar region with neurogenic claudication  Ambulatory consult to Physical Therapy   2. DDD (degenerative disc disease), lumbar  Ambulatory consult to Physical Therapy   3. Lumbar spondylosis           PLAN:     - I have stressed the importance of physical activity and a home exercise plan to help with pain and improve health.  - Schedule for bilateral Transforaminal epidural steroid injection at L4/L5 to help with his pain and progress with a home exercise plan.  - RTC in 2 weeks after the procedure.  - Counseled patient regarding the importance of physical therapy.    The above plan and management options were discussed at length with patient. Patient is in agreement with the above and verbalized understanding. It will be communicated with the referring physician via electronic record, fax, or mail.    Oscar Ravi  08/28/2019

## 2019-08-28 NOTE — H&P (VIEW-ONLY)
Chronic Pain - New Consult    Referring Physician: Lizy Le, *    Chief Complaint:   Chief Complaint   Patient presents with    Low-back Pain        SUBJECTIVE:    Henrietta Villasenor presents to the clinic for the evaluation of low back pain. The pain started and symptoms have been worsening.The pain is located in the low back area and radiates to the bilateral leg swelling.  The pain is described as sharp, shooting, throbbing, tingling and constant and is rated as 5/10. The pain is rated with a score of  5/10 on the BEST day and a score of 8/10 on the WORST day.  Symptoms interfere with daily activity and sleeping. The pain is exacerbated by Standing, Bending, Coughing/Sneezing, Walking, Morning, Lifting and Getting out of bed/chair.  The pain is mitigated by medications. She reports spending 0 hours per day reclining. The patient reports 2 hours of uninterrupted sleep per night.    Patient denies night fever/night sweats, urinary incontinence, bowel incontinence, significant weight loss, significant motor weakness and loss of sensations.    Physical Therapy/Home Exercise: yes      Pain Disability Index Review:  Last 3 PDI Scores 2019   Pain Disability Index (PDI) 49       Pain Medications:    - Opioids: Percocet (Oxycodone/Acetaminophen)  - Adjuvant Medications: Cymbalta ( Duloxetine) and Tizanidine  - Anti-Coagulants: None  - Others: None     report:  Reviewed and consistent with medication use as prescribed.    Pain Procedures: none    Imagin18 MRI Lumbar  Narrative     EXAMINATION:  MRI LUMBAR SPINE WITHOUT CONTRAST    CLINICAL HISTORY:  low back and right leg pain; Other intervertebral disc degeneration, lumbar region    TECHNIQUE:  Multiplanar, multisequence MR images were acquired from the thoracolumbar junction to the sacrum without the administration of contrast.    COMPARISON:  Radiograph 2018    FINDINGS:  Alignment: Normal.    Vertebrae: Normal marrow signal. No  fracture.    Discs: Normal height and signal.    Cord: Normal.  Conus terminates at superior border of L1    Degenerative findings:    T12-L1: Mild broad-based disc bulge without compressive sequelae.    L1-L2: Unremarkable.    L2-L3: Mild broad-based disc bulge with facet arthropathy.  No compressive sequelae.    L3-L4: Broad-based disc bulge and facet arthropathy.  There is mild bilateral neural foraminal narrowing.    L4-L5: Trace anterolisthesis of L4 on L5 with broad-based disc bulge and facet arthropathy.  This results in moderate bilateral neural foraminal narrowing.  Moderate canal stenosis.  There is also significant narrowing of the left greater than right lateral recesses.    L5-S1: Broad-based disc bulge without significant compressive sequelae.    Paraspinal muscles & soft tissues: Paraspinal atrophy.  No concerning findings in the visualized abdomen.  Add      Impression       Moderate degenerative change at L4-5.  Milder degenerative changes elsewhere in the lumbar spine.     6/5/18 Xray Lumbar  Narrative     EXAMINATION:  XR LUMBAR SPINE 5 VIEW WITH FLEX AND EXT    CLINICAL HISTORY:  low back and right leg pain;  Other intervertebral disc degeneration, lumbar region    TECHNIQUE:  Five views of the lumbar spine plus flexion extension views were performed.    COMPARISON:  02/03/2015    FINDINGS:  The lumbar vertebra are intact.  No compression fracture is identified.  Degenerative changes are again seen with small osteophytes at multiple levels.  L4-5 disc space is mildly narrowed.  Degenerative changes are also noted at lower facet joints.  There is associated grade 1 spondylolisthesis at L4-5 which is stable between flexion and extension at about 6 mm.  Alignment is otherwise satisfactory.    Visualized abdomen shows aortic atherosclerosis and surgical clips in the right upper quadrant.      Impression       No acute abnormality.  Chronic degenerative spine changes.           Past Medical History:    Diagnosis Date    Abnormality of lung 11/08/2011    Stable bandlike opacities at the lung bases, most likely representing      Anxiety     Arthritis     CAD (coronary artery disease)     Calculus of ureter 2/22/2011    Cataract     CHF (congestive heart failure)     Chronic back pain 7/29/2012    Colon polyp 9/2010; 11/2010    Colon polyps 7/29/2012    Coronary artery disease involving native coronary artery of native heart with angina pectoris     Depression     Diabetes mellitus     Diabetes mellitus type II     Diverticulitis large intestine 7/27/2015    Diverticulosis 09/25/2010; 11/02/2010; 11/08/2011; 7/29/2012    Duodenal disorder 08/25/2011    Duodenal erosion noted on EGD.    Duodenal ulcer, unspecified as acute or chronic, without hemorrhage, perforation, or obstruction 8/24/2011    E. coli sepsis 12/2010    Due to left ureteral stone with left nephrostomy tube - [place] in [place]    Esophageal dysmotility 01/24/2012    Noted on upper GI-barium swallow.    Facial weakness 1969    Left facial weakness s/p left mastoidectomy in ~ 1969.    Fatty liver 11/08/2011    Reported on CT-abdomen and in 06/2012 Gastro clinic visit note.    Gastric polyp 09/29/2010    GERD (gastroesophageal reflux disease) 7/29/2012    Hepatomegaly 11/08/2011    Reported on CT-abdomen    Herpes zoster with other nervous system complications(053.19) 2/28/2011    Hiatal hernia 06/26/2006; 09/29/2010; 08/25/2011    Noted on barium swallow 2006; noted on  EGD 2011.   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    Trouble in sleeping     Type II or unspecified type diabetes mellitus with neurological manifestations, not stated as uncontrolled(250.60)     Type II or unspecified type diabetes mellitus with peripheral circulatory disorders, not stated as uncontrolled(250.70)     Type II or unspecified type diabetes mellitus with renal manifestations, not stated as uncontrolled(250.40)      Past Surgical History:   Procedure Laterality Date    BELPHAROPTOSIS REPAIR      s/p LAMBERTO levator repair - Dr. Dejesus    CARDIAC CATHETERIZATION      CARPAL TUNNEL RELEASE  3/13/2012    CATARACT EXTRACTION      CHOLECYSTECTOMY      COLONOSCOPY N/A 6/14/2017    [facility]formed [facility] at [facility] ENDO (4TH FLR)    COLONOSCOPY N/A 11/16/2016    [facility] by [facility] at [facility] END[facility] (4TH FLR)    COLONOSCOPY N/A 8/8/2013    [facility] by [facility] at [facility] END[facility] (4TH FLR)    EGD (ESOPHAGOGASTRODUODENOSCOPY) N/A 4/30/2014    Performed by [facility] ENDO (4TH FLR)    EGD (ESOPHAGOGASTRODUODENOSCOPY) N/A 8/8/2013    Per[facility] by [facility] ENDO (4TH FLR)    ESOPHAGOGASTRODUODENOSCOPY (EGD) N/A 11/11/2015    Performed by [facility] ENDO (4TH FLR)    EXTRACORPOREAL SHOCK WAVE LITHOTRIPSY      INSERTION-INTRAOCULAR LENS (IOL) Right 10/31/2018    [facility]    INSERTION-INTRAOCULAR LENS (IOL) Left 3/22/2018    Performed by [facility] at [facility] OR 1ST FLR    JOINT REPLACEMENT      mastoid tumor removal  1969    Left mastoidectomy with residual left facial weakness.    NEPHROSTOMY      OOPHORECTOMY       PHACOEMULSIFICATION-ASPIRATION-CATARACT Right 10/31/2018    Performed [facility]    PHACOEMULSIFICATION-ASPIRATION-CATARACT Left 3/22/2018    [facility] 1ST FLR    SIGMOIDOSCOPY, FLEXIBLE N/A 2013    Performed by [facility] at [facility] ENDO (4TH FLR)    TOTAL ABDOMINAL HYSTERECTOMY  1969    TAHBSO    TOTAL KNEE ARTHROPLASTY  2012    Left     Social History     Socioeconomic History    Marital status: Single     Spouse name: Not on file    Number of children: 5    Years of education: Not on file    Highest education level: Not on file   Occupational History    Not on file   Social Needs    Financial resource strain: Not on file    Food insecurity:     Worry: Not on file     Inability: Not on file    Transportation needs:     Medical: Not on file     Non-medical: Not on file   Tobacco Use    Smoking status: Former Smoker     Packs/day: 1.00     Years: 15.00     Pack years: 15.00     Types: Cigarettes     Last attempt to quit: 2000     Years since quittin.1    Smokeless tobacco: Never Used   Substance and Sexual Activity    Alcohol use: No    Drug use: No    Sexual activity: Never   Lifestyle    Physical activity:     Days per week: Not on file     Minutes per session: Not on file    Stress: Not on file   Relationships    Social connections:     Talks on phone: Not on file     Gets together: Not on file     Attends Jewish service: Not on file     Active member of club or organization: Not on file     Attends meetings of clubs or organizations: Not on file     Relationship status: Not on file   Other Topics Concern    Are you pregnant or think you may be? Not Asked    Breast-feeding Not Asked   Social History Narrative    Single with 5 children. Lives alone. Retired cook.     Family History   Problem Relation Age of Onset    Sleep apnea Sister     Diabetes Sister     Cancer Mother         brain tumor     Hypertension Mother     Heart disease Father     Heart attack Father     Dementia Brother     Lupus Brother     Frontotemporal dementia Brother     No Known Problems Daughter     No Known Problems Son     Diabetes Sister     Lupus Sister     Breast cancer Sister     Diabetes Sister     Cancer Sister         breast cancer    Heart attack Sister     Diabetes Sister     Liver cancer Brother     Drug abuse Brother     Alcohol abuse Brother     Cirrhosis Brother     Drug abuse Brother     No Known Problems Daughter     No Known Problems Son     No Known Problems Son     No Known Problems Maternal Grandmother     No Known Problems Maternal Grandfather     No Known Problems Paternal Grandmother     No Known Problems Paternal Grandfather     Diabetes Brother     Dementia Brother     Diabetes Other     Ovarian cancer Other         Niece     Breast cancer Maternal Aunt     No Known Problems Maternal Uncle     No Known Problems Paternal Aunt     No Known Problems Paternal Uncle     Glaucoma Neg Hx     Blindness Neg Hx     Celiac disease Neg Hx     Colon cancer Neg Hx     Colon polyps Neg Hx     Esophageal cancer Neg Hx     Inflammatory bowel disease Neg Hx     Liver disease Neg Hx     Rectal cancer Neg Hx     Stomach cancer Neg Hx     Ulcerative colitis Neg Hx     Melanoma Neg Hx     Multiple sclerosis Neg Hx     Psoriasis Neg Hx     Amblyopia Neg Hx     Cataracts Neg Hx     Macular degeneration Neg Hx     Retinal detachment Neg Hx     Strabismus Neg Hx     Stroke Neg Hx     Thyroid disease Neg Hx        Review of patient's allergies indicates:   Allergen Reactions    Crestor [rosuvastatin]      Cramping in legs    Ezetimibe      Other reaction(s): abdominal pain, Diarrhea  Other reaction(s): abdominal pain    Hydrocodone      Other reaction(s): Itching    Lisinopril      Other reaction(s): cough      Sulfa (sulfonamide antibiotics)      Itching and Rash       "Sulfamethoxazole     Sulfamethoxazole-trimethoprim     Trimethoprim     Valsartan      Other reaction(s): Angioedema       Current Outpatient Medications   Medication Sig    ACCU-CHEK FASTCLIX Misc     ACCU-CHEK SMARTVIEW TEST STRIP Strp TEST THREE TIMES DAILY    albuterol (VENTOLIN HFA) 90 mcg/actuation inhaler Inhale 2 puffs by mouth into the lungs every 4 (four) hours as needed for Wheezing. Rescue    ALCOHOL ANTISEPTIC PADS (ALCOHOL PREP PADS TOP)     allopurinol (ZYLOPRIM) 100 MG tablet Take 1 tablet (100 mg total) by mouth once daily.    aspirin (ECOTRIN) 81 MG EC tablet Take 1 tablet by mouth Daily.    azelastine (ASTELIN) 137 mcg (0.1 %) nasal spray Spray 1 spray (137 mcg total) by Nasal route 2 (two) times daily.    BD ALCOHOL SWABS PadM USE  WHEN  TESTING BLOOD SUGAR FOUR TIMES DAILY    BD ULTRA-FINE OLU PEN NEEDLES 32 x 5/32 " Ndle Uses 4 times daily, on multiple daily insulin injections    carbamide peroxide (EAR WAX REMOVAL DROPS OTIC)     carvedilol (COREG) 25 MG tablet TAKE 1 TABLET TWICE DAILY    cholecalciferol, vitamin D3, (VITAMIN D3) 400 unit Cap Take 400 Units by mouth once daily.    DULoxetine (CYMBALTA) 60 MG capsule Take 1 capsule (60 mg total) by mouth 2 (two) times daily.    fluticasone (FLONASE) 50 mcg/actuation nasal spray SHAKE LQ AND U 2 SPRAYS IEN QD    furosemide (LASIX) 40 MG tablet TAKE 1 TABLET BY MOUTH TWICE DAILY    insulin aspart (NOVOLOG) 100 unit/mL InPn pen 23 units with meals plus correction scale. Max daily dose=120 units (Patient taking differently: Inject 23 Units into the skin 3 (three) times daily with meals. 23 units with meals plus correction scale. Max daily dose=120 units)    Lactobacillus acidophilus (PROBIOTIC) 10 billion cell Cap Take 1 capsule by mouth once daily.    LANTUS SOLOSTAR U-100 INSULIN glargine 100 units/mL (3mL) SubQ pen INJECT 60 UNITS INTO THE SKIN EVERY EVENING.    loratadine (CLARITIN) 10 mg tablet TAKE 1 TABLET EVERY DAY " "   MUSCLE RUB, WITH CAMPHOR, 4-30-10 % Crea     nitroGLYCERIN (NITROSTAT) 0.4 MG SL tablet Place 1 tablet (0.4 mg total) under the tongue every 5 (five) minutes as needed.    olopatadine (PATADAY) 0.2 % Drop Place 1 drop into both eyes once daily.    ondansetron (ZOFRAN) 4 MG tablet Take 1 tablet (4 mg total) by mouth every 8 (eight) hours as needed for Nausea.    oxyCODONE-acetaminophen (PERCOCET)  mg per tablet Take one tablet by mouth 5 times daily    pantoprazole (PROTONIX) 40 MG tablet TAKE 1 TABLET(40 MG) BY MOUTH twice daily    pen needle, diabetic (SURE-FINE PEN NEEDLES) 29 gauge x 1/2" Ndle Use 4 times daily    potassium chloride SA (K-DUR,KLOR-CON) 10 MEQ tablet Take 1 tablet (10 mEq total) by mouth 2 (two) times daily.    promethazine-dextromethorphan (PROMETHAZINE-DM) 6.25-15 mg/5 mL Syrp 2 tsp every 8 hours as needed for cough    tiZANidine (ZANAFLEX) 4 MG tablet Take 0.5-1 tablets (2-4 mg total) by mouth every 8 (eight) hours.    TRUEPLUS LANCETS 33 gauge Misc TEST BLOOD SUGAR FOUR TIMES DAILY    triamcinolone acetonide 0.1% (KENALOG) 0.1 % cream Apply topically 3 (three) times daily.     No current facility-administered medications for this visit.      Facility-Administered Medications Ordered in Other Visits   Medication    tetracaine HCl (PF) 0.5 % Drop 1 drop       REVIEW OF SYSTEMS:    GENERAL:  No weight loss, malaise or fevers.  HEENT:  Negative for frequent or significant headaches.  NECK:  Negative for lumps, goiter, pain and significant neck swelling.  RESPIRATORY:  Negative for cough, wheezing or shortness of breath. +MARLON, SOB  CARDIOVASCULAR:  Negative for chest pain, leg swelling or palpitations.   GI:  Negative for abdominal discomfort, blood in stools or black stools or change in bowel habits. +GERD  MUSCULOSKELETAL:  Lower back and joint pain  SKIN:  Negative for lesions, rash, and itching.  PSYCH:  Negative for sleep disturbance, mood disorder and recent psychosocial " "stressors.  HEMATOLOGY/LYMPHOLOGY:  Negative for prolonged bleeding, bruising easily or swollen nodes. +Anemia  NEURO:   No history of headaches, syncope, paralysis, seizures or tremors.  All other reviewed and negative other than HPI.    OBJECTIVE:    BP (!) 193/96   Pulse 65   Temp 98.3 °F (36.8 °C) (Oral)   Resp 18   Ht 5' 8" (1.727 m)   Wt 97 kg (213 lb 12.8 oz)   LMP  (LMP Unknown)   BMI 32.51 kg/m²     PHYSICAL EXAMINATION:    General appearance: Well appearing, in no acute distress, alert and oriented x3.  Psych:  Mood and affect appropriate.  Skin: Skin color, texture, turgor normal, no rashes or lesions, in both upper and lower body.  Head/face:  Normocephalic, atraumatic. No palpable lymph nodes.  Neck: No pain to palpation over the cervical paraspinous muscles. Spurling Negative. No pain with neck flexion, extension, or lateral flexion.   Cor: RRR  Pulm: CTA  GI:  Soft and non-tender.  Back: Straight leg raising in the sitting and supine positions is negative to radicular pain. Positive tenderness to palpation over bilateral lumbar paraspinal muscles.  Limited range of motion of lumbar spine.   Extremities: Peripheral joint ROM is full and pain free without obvious instability or laxity in all four extremities. No deformities, edema, or skin discoloration. Good capillary refill.  Musculoskeletal: Shoulder, hip, sacroiliac and knee provocative maneuvers are negative. Bilateral upper and lower extremity strength is normal and symmetric.  No atrophy or tone abnormalities are noted.  Neuro: Bilateral upper and lower extremity coordination and muscle stretch reflexes are physiologic and symmetric.  Plantar response are downgoing. No loss of sensation is noted.  Gait: Antalgic.    ASSESSMENT: 79 y.o. year old female with chronic lower back pain with radiation to bilateral lower extremities to the calf level consistent with lumbar radiculopathy.  She has tried medication management, physical therapy and " healthy back program in the past.  We will refer her to physical therapy and schedule her for bilateral L4/L5 transforaminal epidural steroid injection.    1. Spinal stenosis of lumbar region with neurogenic claudication  Ambulatory consult to Physical Therapy   2. DDD (degenerative disc disease), lumbar  Ambulatory consult to Physical Therapy   3. Lumbar spondylosis           PLAN:     - I have stressed the importance of physical activity and a home exercise plan to help with pain and improve health.  - Schedule for bilateral Transforaminal epidural steroid injection at L4/L5 to help with his pain and progress with a home exercise plan.  - RTC in 2 weeks after the procedure.  - Counseled patient regarding the importance of physical therapy.    The above plan and management options were discussed at length with patient. Patient is in agreement with the above and verbalized understanding. It will be communicated with the referring physician via electronic record, fax, or mail.    Oscar Ravi  08/28/2019

## 2019-08-28 NOTE — TELEPHONE ENCOUNTER
----- Message from Emilia Herbert DNP sent at 8/28/2019  2:02 PM CDT -----  Abdominal US was normal

## 2019-08-28 NOTE — LETTER
August 28, 2019      Lizy Le MD  [address]  [address]  [facility]  [facility] [address]           [facility] [place] [address] [facility] [address]  [address], [address]  [facility] [address]  Phone: [phone]  Fax: [phone]          Patient: Henrietta Villasenor   MR Number: [id number]   YOB: 1940   Date of Visit: 8/28/2019       Dear Dr. Lizy Le:    Thank you for referring Henrietta Villasenor to me for evaluation. Attached you will find relevant portions of my assessment and plan of care.    If you have questions, please do not hesitate to call me. I look forward to following Henrietta Villasenor along with you.    Sincerely,    Oscar Ravi MD    Enclosure  CC:  No Recipients    If you would like to receive this communication electronically, please contact [email or web address] or [phone] to request more information on [employer] access.    For providers and/or their staff who would like to refer a patient to [facility], please contact us through our one-stop-shop provider referral line, [facility], at [phone].    If you feel you have received this communication in error or would no longer like to receive these types of communications, please e-mail [email or web address]

## 2019-08-29 ENCOUNTER — TELEPHONE (OUTPATIENT)
Dept: SPINE | Facility: CLINIC | Age: 79
End: 2019-08-29

## 2019-08-29 DIAGNOSIS — M79.89 LEG SWELLING: Primary | ICD-10-CM

## 2019-08-29 DIAGNOSIS — R60.9 EDEMA, UNSPECIFIED TYPE: ICD-10-CM

## 2019-08-29 NOTE — TELEPHONE ENCOUNTER
----- Message from Edel Bailey MA sent at 8/28/2019  4:24 PM CDT -----  Yes, they can detect DVT but the order has to changed from VAS to US lower extremity.   ----- Message -----  From: Lizy Le MD  Sent: 8/28/2019   1:46 PM  To: Edel Bailey MA    The US was to look for DVT, she was complaining of intermittent leg swelling.  I am not sure it is necessary at this point since it has been a month.  Can the US department due a US of the veins to look for DVT  ----- Message -----  From: Edel Bailey MA  Sent: 8/28/2019   1:42 PM  To: Lizy Le MD    The following patient orders for the US on her legs has to be removed. Change from VAS to US lower extremity. The US here at [facility] can not perform this exam. Would you recommend patient to have VAS versus regular US?                                                           Edel

## 2019-09-10 ENCOUNTER — TELEPHONE (OUTPATIENT)
Dept: INTERNAL MEDICINE | Facility: CLINIC | Age: 79
End: 2019-09-10

## 2019-09-10 ENCOUNTER — LAB VISIT (OUTPATIENT)
Dept: LAB | Facility: HOSPITAL | Age: 79
End: 2019-09-10
Attending: INTERNAL MEDICINE
Payer: MEDICARE

## 2019-09-10 ENCOUNTER — OFFICE VISIT (OUTPATIENT)
Dept: INTERNAL MEDICINE | Facility: CLINIC | Age: 79
End: 2019-09-10
Payer: MEDICARE

## 2019-09-10 VITALS
DIASTOLIC BLOOD PRESSURE: 60 MMHG | BODY MASS INDEX: 32.03 KG/M2 | HEIGHT: 68 IN | TEMPERATURE: 97 F | OXYGEN SATURATION: 98 % | SYSTOLIC BLOOD PRESSURE: 110 MMHG | HEART RATE: 62 BPM | WEIGHT: 211.31 LBS

## 2019-09-10 DIAGNOSIS — I10 ESSENTIAL HYPERTENSION: ICD-10-CM

## 2019-09-10 DIAGNOSIS — M10.9 GOUT, ARTHRITIS: ICD-10-CM

## 2019-09-10 DIAGNOSIS — Z79.4 TYPE 2 DIABETES MELLITUS WITH DIABETIC POLYNEUROPATHY, WITH LONG-TERM CURRENT USE OF INSULIN: ICD-10-CM

## 2019-09-10 DIAGNOSIS — K21.9 GASTROESOPHAGEAL REFLUX DISEASE WITHOUT ESOPHAGITIS: ICD-10-CM

## 2019-09-10 DIAGNOSIS — E11.42 TYPE 2 DIABETES MELLITUS WITH DIABETIC POLYNEUROPATHY, WITH LONG-TERM CURRENT USE OF INSULIN: ICD-10-CM

## 2019-09-10 DIAGNOSIS — G89.29 OTHER CHRONIC BACK PAIN: Chronic | ICD-10-CM

## 2019-09-10 DIAGNOSIS — K21.9 GASTROESOPHAGEAL REFLUX DISEASE, ESOPHAGITIS PRESENCE NOT SPECIFIED: ICD-10-CM

## 2019-09-10 DIAGNOSIS — I25.119 CORONARY ARTERY DISEASE INVOLVING NATIVE CORONARY ARTERY OF NATIVE HEART WITH ANGINA PECTORIS: ICD-10-CM

## 2019-09-10 DIAGNOSIS — M54.9 OTHER CHRONIC BACK PAIN: Chronic | ICD-10-CM

## 2019-09-10 DIAGNOSIS — I10 ESSENTIAL HYPERTENSION: Primary | ICD-10-CM

## 2019-09-10 LAB
ALBUMIN SERPL BCP-MCNC: 3.4 G/DL (ref 3.5–5.2)
ALP SERPL-CCNC: 81 U/L (ref 55–135)
ALT SERPL W/O P-5'-P-CCNC: 9 U/L (ref 10–44)
ANION GAP SERPL CALC-SCNC: 7 MMOL/L (ref 8–16)
AST SERPL-CCNC: 15 U/L (ref 10–40)
BASOPHILS # BLD AUTO: 0.04 K/UL (ref 0–0.2)
BASOPHILS NFR BLD: 0.4 % (ref 0–1.9)
BILIRUB SERPL-MCNC: 0.3 MG/DL (ref 0.1–1)
BUN SERPL-MCNC: 18 MG/DL (ref 8–23)
CALCIUM SERPL-MCNC: 10 MG/DL (ref 8.7–10.5)
CHLORIDE SERPL-SCNC: 104 MMOL/L (ref 95–110)
CO2 SERPL-SCNC: 29 MMOL/L (ref 23–29)
CREAT SERPL-MCNC: 1.4 MG/DL (ref 0.5–1.4)
DIFFERENTIAL METHOD: ABNORMAL
EOSINOPHIL # BLD AUTO: 0.4 K/UL (ref 0–0.5)
EOSINOPHIL NFR BLD: 3.8 % (ref 0–8)
ERYTHROCYTE [DISTWIDTH] IN BLOOD BY AUTOMATED COUNT: 14.1 % (ref 11.5–14.5)
EST. GFR  (AFRICAN AMERICAN): 41 ML/MIN/1.73 M^2
EST. GFR  (NON AFRICAN AMERICAN): 36 ML/MIN/1.73 M^2
GLUCOSE SERPL-MCNC: 283 MG/DL (ref 70–110)
HCT VFR BLD AUTO: 36.7 % (ref 37–48.5)
HGB BLD-MCNC: 11.8 G/DL (ref 12–16)
LYMPHOCYTES # BLD AUTO: 2.3 K/UL (ref 1–4.8)
LYMPHOCYTES NFR BLD: 25.2 % (ref 18–48)
MCH RBC QN AUTO: 28.3 PG (ref 27–31)
MCHC RBC AUTO-ENTMCNC: 32.2 G/DL (ref 32–36)
MCV RBC AUTO: 88 FL (ref 82–98)
MONOCYTES # BLD AUTO: 0.8 K/UL (ref 0.3–1)
MONOCYTES NFR BLD: 8.7 % (ref 4–15)
NEUTROPHILS # BLD AUTO: 5.6 K/UL (ref 1.8–7.7)
NEUTROPHILS NFR BLD: 61.9 % (ref 38–73)
PLATELET # BLD AUTO: 237 K/UL (ref 150–350)
PMV BLD AUTO: 10 FL (ref 9.2–12.9)
POTASSIUM SERPL-SCNC: 4.1 MMOL/L (ref 3.5–5.1)
PROT SERPL-MCNC: 7.5 G/DL (ref 6–8.4)
RBC # BLD AUTO: 4.17 M/UL (ref 4–5.4)
SODIUM SERPL-SCNC: 140 MMOL/L (ref 136–145)
WBC # BLD AUTO: 9.18 K/UL (ref 3.9–12.7)

## 2019-09-10 PROCEDURE — 99214 OFFICE O/P EST MOD 30 MIN: CPT | Mod: HCNC,S$GLB,, | Performed by: INTERNAL MEDICINE

## 2019-09-10 PROCEDURE — 3078F DIAST BP <80 MM HG: CPT | Mod: HCNC,CPTII,S$GLB, | Performed by: INTERNAL MEDICINE

## 2019-09-10 PROCEDURE — 99499 RISK ADDL DX/OHS AUDIT: ICD-10-PCS | Mod: HCNC,S$GLB,, | Performed by: INTERNAL MEDICINE

## 2019-09-10 PROCEDURE — 99499 UNLISTED E&M SERVICE: CPT | Mod: HCNC,S$GLB,, | Performed by: INTERNAL MEDICINE

## 2019-09-10 PROCEDURE — 3078F PR MOST RECENT DIASTOLIC BLOOD PRESSURE < 80 MM HG: ICD-10-PCS | Mod: HCNC,CPTII,S$GLB, | Performed by: INTERNAL MEDICINE

## 2019-09-10 PROCEDURE — 1101F PT FALLS ASSESS-DOCD LE1/YR: CPT | Mod: HCNC,CPTII,S$GLB, | Performed by: INTERNAL MEDICINE

## 2019-09-10 PROCEDURE — 36415 COLL VENOUS BLD VENIPUNCTURE: CPT | Mod: HCNC

## 2019-09-10 PROCEDURE — 80053 COMPREHEN METABOLIC PANEL: CPT | Mod: HCNC

## 2019-09-10 PROCEDURE — 3074F SYST BP LT 130 MM HG: CPT | Mod: HCNC,CPTII,S$GLB, | Performed by: INTERNAL MEDICINE

## 2019-09-10 PROCEDURE — 99999 PR PBB SHADOW E&M-EST. PATIENT-LVL V: ICD-10-PCS | Mod: PBBFAC,HCNC,, | Performed by: INTERNAL MEDICINE

## 2019-09-10 PROCEDURE — 3074F PR MOST RECENT SYSTOLIC BLOOD PRESSURE < 130 MM HG: ICD-10-PCS | Mod: HCNC,CPTII,S$GLB, | Performed by: INTERNAL MEDICINE

## 2019-09-10 PROCEDURE — 1101F PR PT FALLS ASSESS DOC 0-1 FALLS W/OUT INJ PAST YR: ICD-10-PCS | Mod: HCNC,CPTII,S$GLB, | Performed by: INTERNAL MEDICINE

## 2019-09-10 PROCEDURE — 99214 PR OFFICE/OUTPT VISIT, EST, LEVL IV, 30-39 MIN: ICD-10-PCS | Mod: HCNC,S$GLB,, | Performed by: INTERNAL MEDICINE

## 2019-09-10 PROCEDURE — 85025 COMPLETE CBC W/AUTO DIFF WBC: CPT | Mod: HCNC

## 2019-09-10 PROCEDURE — 99999 PR PBB SHADOW E&M-EST. PATIENT-LVL V: CPT | Mod: PBBFAC,HCNC,, | Performed by: INTERNAL MEDICINE

## 2019-09-10 RX ORDER — OXYCODONE AND ACETAMINOPHEN 10; 325 MG/1; MG/1
TABLET ORAL
Qty: 150 TABLET | Refills: 0 | Status: SHIPPED | OUTPATIENT
Start: 2019-09-10 | End: 2019-10-10 | Stop reason: SDUPTHER

## 2019-09-10 NOTE — PROGRESS NOTES
CHIEF COMPLAINT: Follow up of multiple issues     HISTORY OF PRESENT ILLNESS: This is a 79-year-old woman who presents for follow up of above    Since our last visit, she had to travel to see her sister in [place] for 1.5 weeks who was sick.  On the way home, her niece  of a heart attack.  Niece lived in [place].  She had to go to [place] for the . The niece is the daughter of her sister who lives in [place]. Her sister in [place] is doing better.   Sister who lives in [place] is fine.     She did not see orthopedics for her right knee due to the travel above.  She would like to reschedule her orthopedics apts.       She has been taking furosemide 40 mg twice daily, coreg 25 mg twice daily for her hypertension.     She has been taking pantoprazole 40 mg twice daily. Food is not getting stuck in her throat. Occasional nausea.  She continues to have intermittent abdominal cramping. No vomiting, constipation, diarrhea.  Occasional soft or watery stools.  Stool has been dark the last week. She has also had hemorrhoids and had some blood on the tissue when she wipes from a hemorrhoid. She is taking iron supplement once daily for the last 2 weeks. She is drinking herbal tea for constipation.       She is taking allopurinol 100 mg daily for gout.       She is now taking duloxetine 60 mg twice daily. Mood is still not good.      She takes tizanidine 4 mg at bedtime to help with muscle spasm of her back. She continues to have back pain. She is taking Oxycodone apap 10/325 3-5 times daily which helps her back. She is scheduled to have an epidural with pain management.     She is taking Lantus 60 units in the evening and Novolog 23 units once or twice daily with meals.  No hypoglycemia.  Averages 130-140. No hypoglycemia     PAST MEDICAL HISTORY:   1. Diabetes mellitus   2. Hypertension   3. Hyperlipidemia   4. Left heart catheterization 2007 which revealed luminal irregularities in the LAD, left circumflex  "and right coronary artery. She had diastolic dysfunction and patent renal arteries.   5. Sleep apnea   6. Obesity.   7. Nephrolithiasis status post lithotripsy.   8. Reflux - had nonerosive gastropathy on EGD September 2007. Gastritis on EGD 9/2010  9. Hidradenitis suppurativa.   10. History of diverticulitis with a hospitalized October 2007.   11. Migraines   12. Obesity.   13. 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    14. Total hysterectomy in 1969.   15. Cholecystectomy was done at that time as well.   16. 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   17. Colon polyps on colonscopy 11/2010 - due 2013   18. Hospitalization 12/10 for e coli sepsis due to left ureteral stone with left nephrostomy tube in [place], [place]   19. Left knee replacement 9/2012      MEDICATIONS and ALLERGIES: Updated on EPIC.     PHYSICAL EXAMINATION:   /78 (BP Location: Left arm, Patient Position: Sitting, BP Method: Large (Manual))   Pulse 77   Temp 97.4 °F (36.3 °C) (Oral)   Ht 5' 8" (1.727 m)   Wt 95.8 kg (211 lb 5 oz)   LMP  (LMP Unknown)   SpO2 98%   BMI 32.13 kg/m²     GENERAL: She is alert, oriented, no apparent distress. Affect within   normal limits.  Conjunctiva anicteric. . Oropharynx clear.   NECK: Supple.   Respiratory: Effort normal. Lungs clear  HEART: Regular rate and rhythm without murmurs, gallops or rubs.   No lower extremity edema.    ABDOMEN: soft, non distended, bowel sounds present, no hepatosplenomgaly. Left lower quadrant tenderness. No rebound or guarding  Green stool that has been heme positive.            ASSESSMENT AND PLAN:   1. Right leg pain - suspect due to OA knee - to ortho for injection.   2. Gout - on allopurinol 100 mg daily.   2.  Mood disorder -  duloxetine to 60 mg twice daily  3.  Neck, shoulder and back pain -follow up with [facility]. To have epidural  4. Asthma - stable   6. HTN -stable  7. Gerd " -on pantoprazole 40 mg twice daily - EGD. CBC  8. Diabetes with neuropathy- watch sugars  9. Hyperlipidemia - off lipitor  10. Allergic rhinitis - stable  11. Diastolic congestive heart failure - decrease fluid pill to once daily unless swelling. .    12. CRI -stable  13. Obesity - discussed diet, exercise and weight loss  14. CAD -risk factor modification  15. Aortic atherosclerosis and possible mesenteric artery stenosis- risk factor modification  16. Tortuous aorta - HTN controlled  17. Colon polyps - Colonoscopy in 8/2013 - 2 polyps. Flex sig 11/2013 - mild granular mucosa.  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 . Attempted colonoscopy 11/2016 du to diarrhea which has resolved. She had poor prep with hyperplastic rectal biopsy.  Colonoscopy 6/2017 diverticulosis - no polyps due 2023.   MMG 5/19  Prevnar 10/15 and flu shot 11/16  I will see her back in 4 weeks sooner if issues. .

## 2019-09-10 NOTE — TELEPHONE ENCOUNTER
----- Message from Jeannine Arriaga MD sent at 9/10/2019  5:46 PM CDT -----  Please notify pt  Your blood counts, kidney and liver function are fine.  Continue to take iron for your blood counts  SF

## 2019-09-17 ENCOUNTER — HOSPITAL ENCOUNTER (OUTPATIENT)
Facility: OTHER | Age: 79
Discharge: HOME | End: 2019-09-17
Attending: ANESTHESIOLOGY | Admitting: ANESTHESIOLOGY
Payer: MEDICARE

## 2019-09-17 VITALS
HEART RATE: 72 BPM | RESPIRATION RATE: 14 BRPM | SYSTOLIC BLOOD PRESSURE: 177 MMHG | HEIGHT: 68 IN | TEMPERATURE: 98 F | OXYGEN SATURATION: 97 % | BODY MASS INDEX: 31.98 KG/M2 | WEIGHT: 211 LBS | DIASTOLIC BLOOD PRESSURE: 96 MMHG

## 2019-09-17 DIAGNOSIS — M54.16 LUMBAR RADICULOPATHY: Primary | ICD-10-CM

## 2019-09-17 DIAGNOSIS — G89.29 CHRONIC PAIN: ICD-10-CM

## 2019-09-17 LAB — POCT GLUCOSE: 188 MG/DL (ref 70–110)

## 2019-09-17 PROCEDURE — 63600175 PHARM REV CODE 636 W HCPCS: Mod: HCNC | Performed by: ANESTHESIOLOGY

## 2019-09-17 PROCEDURE — 64483 PR EPIDURAL INJ, ANES/STEROID, TRANSFORAMINAL, LUMB/SACR, SNGL LEVL: ICD-10-PCS | Mod: 50,HCNC,, | Performed by: ANESTHESIOLOGY

## 2019-09-17 PROCEDURE — 64483 NJX AA&/STRD TFRM EPI L/S 1: CPT | Mod: 50,HCNC | Performed by: ANESTHESIOLOGY

## 2019-09-17 PROCEDURE — 64483 NJX AA&/STRD TFRM EPI L/S 1: CPT | Mod: 50,HCNC,, | Performed by: ANESTHESIOLOGY

## 2019-09-17 PROCEDURE — 99152 MOD SED SAME PHYS/QHP 5/>YRS: CPT | Mod: HCNC,,, | Performed by: ANESTHESIOLOGY

## 2019-09-17 PROCEDURE — 99152 PR MOD CONSCIOUS SEDATION, SAME PHYS, 5+ YRS, FIRST 15 MIN: ICD-10-PCS | Mod: HCNC,,, | Performed by: ANESTHESIOLOGY

## 2019-09-17 RX ORDER — SODIUM CHLORIDE 9 MG/ML
500 INJECTION, SOLUTION INTRAVENOUS CONTINUOUS
Status: DISCONTINUED | OUTPATIENT
Start: 2019-09-17 | End: 2019-09-17 | Stop reason: HOSPADM

## 2019-09-17 RX ORDER — FENTANYL CITRATE 50 UG/ML
INJECTION, SOLUTION INTRAMUSCULAR; INTRAVENOUS
Status: DISCONTINUED | OUTPATIENT
Start: 2019-09-17 | End: 2019-09-17 | Stop reason: HOSPADM

## 2019-09-17 RX ORDER — MIDAZOLAM HYDROCHLORIDE 1 MG/ML
INJECTION INTRAMUSCULAR; INTRAVENOUS
Status: DISCONTINUED | OUTPATIENT
Start: 2019-09-17 | End: 2019-09-17 | Stop reason: HOSPADM

## 2019-09-17 RX ADMIN — SODIUM CHLORIDE 500 ML: 0.9 INJECTION, SOLUTION INTRAVENOUS at 10:09

## 2019-09-17 NOTE — PLAN OF CARE
Pt tolerated procedure well. Pt reports 0 /10 pain after procedure. Assisted pt up for first time. Steady on feet. All discharge instructions given.

## 2019-09-17 NOTE — DISCHARGE INSTRUCTIONS

## 2019-09-17 NOTE — OP NOTE
Patient Name: Henrietta Villasenor  MRN: [id number]    INFORMED CONSENT: The procedure, risks, benefits and options were discussed with patient. There are no contraindications to the procedure. The patient expressed understanding and agreed to proceed. The personnel performing the procedure was discussed. I verify that I personally obtained Henrietta's consent prior to the start of the procedure and the signed consent can be found on the patient's chart.    Procedure Date: 09/17/2019    Anesthesia: Topical    Pre Procedure diagnosis: Lumbar radiculopathy [M54.16]  1. Lumbar radiculopathy    2. Chronic pain      Post-Procedure diagnosis: SAME      Sedation: Yes - Fentanyl 100 mcg and Midazolam 2 mg    PROCEDURE:Bilateral L4/L5   TRANSFORAMINAL EPIDURAL STEROID INJECTION        DESCRIPTION OF PROCEDURE: The patient was brought to the procedure room. After performing time out IV access was obtained prior to the procedure. The patient was positioned prone on the fluoroscopy table. Continuous hemodynamic monitoring was initiated including blood pressure, EKG, and pulse oximetry. . The skin was prepped with chlorhexidine three times and draped in a sterile fashion. Skin anesthesia was achieved using 3 mL of lidocaine 1% over the respective injection site.     An oblique fluoroscopic view was obtained, with the superior articular process of the inferior vertebral body aligned with the pedicle. The tip of a 22-gauge 5-inch Quincke-type spinal needle was advanced toward the 6 oclock position of the pedicle under intermittent fluoroscopic guidance. Confirmation of proper needle position was made with AP, oblique, and lateral fluoroscopic views. Negative aspiration for blood or CSF was confirmed. 2 mL of Omnipaque 300 was injected. Live fluoroscopic imaging revealed a clear outline of the spinal nerve with proximal spread of agent through the neural foramen into the anterior epidural space. A total combination of 3 mL of Lidocaine 0.5%  and 10 mg decadron was injected at each level. Contrast spread was noted from L3 to L5 level. There was no pain on injection. The needle was removed and bleeding was nil.  A sterile dressing was applied. Henrietta was taken back to the recovery room for further observation.     Blood Loss: Nill  Specimen: None    Oscar Ravi MD

## 2019-09-17 NOTE — DISCHARGE SUMMARY
"Discharge Note  Short Stay      SUMMARY     Admit Date: 9/17/2019    Attending Physician: Oscar Ravi MD      Discharge Physician: Ernie Lovett      Discharge Date: 9/17/2019 4:55 PM    Procedure(s) (LRB):  INJECTION, STEROID, EPIDURAL, TRANSFORAMINAL APPROACH (Bilateral)    Final Diagnosis: Lumbar radiculopathy [M54.16]    Disposition: Home or self care    Patient Instructions:   Discharge Medication List as of 9/17/2019 12:11 PM      CONTINUE these medications which have NOT CHANGED    Details   !! ACCU-CHEK FASTCLIX Misc Starting 11/17/2014, Until Discontinued, Historical Med      ACCU-CHEK SMARTVIEW TEST STRIP Strp TEST THREE TIMES DAILY, Normal      albuterol (VENTOLIN HFA) 90 mcg/actuation inhaler Inhale 2 puffs by mouth into the lungs every 4 (four) hours as needed for Wheezing. Rescue, Starting Fri 6/22/2018, Normal      !! ALCOHOL ANTISEPTIC PADS (ALCOHOL PREP PADS TOP) Starting 10/17/2016, Until Discontinued, Historical Med      allopurinol (ZYLOPRIM) 100 MG tablet Take 1 tablet (100 mg total) by mouth once daily., Starting Fri 5/10/2019, Normal      aspirin (ECOTRIN) 81 MG EC tablet Take 1 tablet by mouth Daily., Starting 5/29/2012, Until Discontinued, Historical Med      azelastine (ASTELIN) 137 mcg (0.1 %) nasal spray Spray 1 spray (137 mcg total) by Nasal route 2 (two) times daily., Starting Fri 4/12/2019, Until Sat 4/11/2020, Normal      !! BD ALCOHOL SWABS PadM USE  WHEN  TESTING BLOOD SUGAR FOUR TIMES DAILY, Normal      BD ULTRA-FINE OLU PEN NEEDLES 32 x 5/32 " Ndle Uses 4 times daily, on multiple daily insulin injections, Normal      carbamide peroxide (EAR WAX REMOVAL DROPS OTIC) Starting Tue 11/27/2018, Historical Med      carvedilol (COREG) 25 MG tablet TAKE 1 TABLET TWICE DAILY, Normal      cholecalciferol, vitamin D3, (VITAMIN D3) 400 unit Cap Take 400 Units by mouth once daily., Historical Med      DULoxetine (CYMBALTA) 60 MG capsule Take 1 capsule (60 mg total) by mouth 2 (two) " "times daily., Starting Fri 4/12/2019, Until Sat 4/11/2020, Normal      fluticasone (FLONASE) 50 mcg/actuation nasal spray SHAKE LQ AND U 2 SPRAYS IEN QD, Normal      furosemide (LASIX) 40 MG tablet TAKE 1 TABLET BY MOUTH TWICE DAILY, Normal      insulin aspart (NOVOLOG) 100 unit/mL InPn pen 23 units with meals plus correction scale. Max daily dose=120 units, Normal      Lactobacillus acidophilus (PROBIOTIC) 10 billion cell Cap Take 1 capsule by mouth once daily., Starting 3/28/2016, Until Discontinued, OTC      LANTUS SOLOSTAR U-100 INSULIN glargine 100 units/mL (3mL) SubQ pen INJECT 60 UNITS INTO THE SKIN EVERY EVENING., Normal      loratadine (CLARITIN) 10 mg tablet TAKE 1 TABLET EVERY DAY, Normal      MUSCLE RUB, WITH CAMPHOR, 4-30-10 % Crea Starting 2/6/2017, Until Discontinued, Historical Med      nitroGLYCERIN (NITROSTAT) 0.4 MG SL tablet Place 1 tablet (0.4 mg total) under the tongue every 5 (five) minutes as needed., Starting 11/21/2016, Until Discontinued, Normal      olopatadine (PATADAY) 0.2 % Drop Place 1 drop into both eyes once daily., Starting Fri 6/7/2019, Until Sat 6/6/2020, Normal      ondansetron (ZOFRAN) 4 MG tablet Take 1 tablet (4 mg total) by mouth every 8 (eight) hours as needed for Nausea., Starting Mon 7/29/2019, Normal      oxyCODONE-acetaminophen (PERCOCET)  mg per tablet Take one tablet by mouth 5 times daily, Normal      pantoprazole (PROTONIX) 40 MG tablet TAKE 1 TABLET(40 MG) BY MOUTH twice daily, Normal      pen needle, diabetic (SURE-FINE PEN NEEDLES) 29 gauge x 1/2" Ndle Use 4 times daily, Normal      potassium chloride SA (K-DUR,KLOR-CON) 10 MEQ tablet Take 1 tablet (10 mEq total) by mouth 2 (two) times daily., Starting Sun 10/7/2018, Normal      promethazine-dextromethorphan (PROMETHAZINE-DM) 6.25-15 mg/5 mL Syrp 2 tsp every 8 hours as needed for cough, Normal      tiZANidine (ZANAFLEX) 4 MG tablet Take 0.5-1 tablets (2-4 mg total) by mouth every 8 (eight) hours., Starting " Fri 12/14/2018, Normal      triamcinolone acetonide 0.1% (KENALOG) 0.1 % cream Apply topically 3 (three) times daily., Starting Thu 10/27/2016, Until Fri 5/10/2019, Normal      !! TRUEPLUS LANCETS 33 gauge Misc TEST BLOOD SUGAR FOUR TIMES DAILY, Normal       !! - Potential duplicate medications found. Please discuss with provider.              Discharge Diagnosis: Lumbar radiculopathy [M54.16]  Condition on Discharge: Stable with no complications to procedure   Diet on Discharge: Same as before.  Activity: as per instruction sheet.  Discharge to: Home with a responsible adult.  Follow up: 2-4 weeks    Please call my office or pager at [phone] if experienced any weakness or loss of sensation, fever > 101.5, pain uncontrolled with oral medications, persistent nausea/vomiting/or diarrhea, redness or drainage from the incisions, or any other worrisome concerns. If physician on call was not reached or could not communicate with our office for any reason please go to the nearest emergency department

## 2019-09-18 ENCOUNTER — TELEPHONE (OUTPATIENT)
Dept: INTERNAL MEDICINE | Facility: CLINIC | Age: 79
End: 2019-09-18

## 2019-09-18 NOTE — TELEPHONE ENCOUNTER
----- Message from Leidy Chung sent at 9/18/2019 11:20 AM CDT -----  Contact: Pt self Mobile/Home [phone]   Patient is calling in regards to her saying that she's been coughing all night and she have neck pain. She's been having the neck pain for almost a week now and she would like for you to write her a script and have it sent to..    Patient's Pharmacy: [id number] [place] [id number] AT [facility] [address]  Phone# [phone], Fax# [phone]

## 2019-09-19 ENCOUNTER — PATIENT OUTREACH (OUTPATIENT)
Dept: ADMINISTRATIVE | Facility: OTHER | Age: 79
End: 2019-09-19

## 2019-09-19 RX ORDER — PROMETHAZINE HYDROCHLORIDE AND DEXTROMETHORPHAN HYDROBROMIDE 6.25; 15 MG/5ML; MG/5ML
SYRUP ORAL
Qty: 240 ML | Refills: 1 | Status: SHIPPED | OUTPATIENT
Start: 2019-09-19 | End: 2019-11-18 | Stop reason: SDUPTHER

## 2019-09-19 NOTE — TELEPHONE ENCOUNTER
Get more information  Fever, chills, sore throat?  She just had an epidural on Tuesday - how is her back doing?

## 2019-09-19 NOTE — TELEPHONE ENCOUNTER
Pt stated her neck is hurting and she is coughing like crazy and can't sleep at night. No sore throat or she stated she had a little fever, but it went away. Pt stated her back is doing alright it hadn't been hurting. She stated it is sore where she had the shot, but she prefers the soreness over the back pain..Camryn Rosenberg LPN

## 2019-09-24 ENCOUNTER — OFFICE VISIT (OUTPATIENT)
Dept: ORTHOPEDICS | Facility: CLINIC | Age: 79
End: 2019-09-24
Payer: MEDICARE

## 2019-09-24 VITALS
BODY MASS INDEX: 31.98 KG/M2 | HEIGHT: 68 IN | SYSTOLIC BLOOD PRESSURE: 147 MMHG | HEART RATE: 80 BPM | WEIGHT: 211 LBS | DIASTOLIC BLOOD PRESSURE: 81 MMHG

## 2019-09-24 DIAGNOSIS — M17.11 PRIMARY OSTEOARTHRITIS OF RIGHT KNEE: Primary | ICD-10-CM

## 2019-09-24 PROCEDURE — 1101F PT FALLS ASSESS-DOCD LE1/YR: CPT | Mod: HCNC,CPTII,S$GLB, | Performed by: NURSE PRACTITIONER

## 2019-09-24 PROCEDURE — 20610 DRAIN/INJ JOINT/BURSA W/O US: CPT | Mod: HCNC,RT,S$GLB, | Performed by: NURSE PRACTITIONER

## 2019-09-24 PROCEDURE — 99999 PR PBB SHADOW E&M-EST. PATIENT-LVL III: ICD-10-PCS | Mod: PBBFAC,HCNC,, | Performed by: NURSE PRACTITIONER

## 2019-09-24 PROCEDURE — 20610 PR DRAIN/INJECT LARGE JOINT/BURSA: ICD-10-PCS | Mod: HCNC,RT,S$GLB, | Performed by: NURSE PRACTITIONER

## 2019-09-24 PROCEDURE — 99213 OFFICE O/P EST LOW 20 MIN: CPT | Mod: 25,HCNC,S$GLB, | Performed by: NURSE PRACTITIONER

## 2019-09-24 PROCEDURE — 3077F SYST BP >= 140 MM HG: CPT | Mod: HCNC,CPTII,S$GLB, | Performed by: NURSE PRACTITIONER

## 2019-09-24 PROCEDURE — 99213 PR OFFICE/OUTPT VISIT, EST, LEVL III, 20-29 MIN: ICD-10-PCS | Mod: 25,HCNC,S$GLB, | Performed by: NURSE PRACTITIONER

## 2019-09-24 PROCEDURE — 3077F PR MOST RECENT SYSTOLIC BLOOD PRESSURE >= 140 MM HG: ICD-10-PCS | Mod: HCNC,CPTII,S$GLB, | Performed by: NURSE PRACTITIONER

## 2019-09-24 PROCEDURE — 1101F PR PT FALLS ASSESS DOC 0-1 FALLS W/OUT INJ PAST YR: ICD-10-PCS | Mod: HCNC,CPTII,S$GLB, | Performed by: NURSE PRACTITIONER

## 2019-09-24 PROCEDURE — 99999 PR PBB SHADOW E&M-EST. PATIENT-LVL III: CPT | Mod: PBBFAC,HCNC,, | Performed by: NURSE PRACTITIONER

## 2019-09-24 PROCEDURE — 3079F PR MOST RECENT DIASTOLIC BLOOD PRESSURE 80-89 MM HG: ICD-10-PCS | Mod: HCNC,CPTII,S$GLB, | Performed by: NURSE PRACTITIONER

## 2019-09-24 PROCEDURE — 3079F DIAST BP 80-89 MM HG: CPT | Mod: HCNC,CPTII,S$GLB, | Performed by: NURSE PRACTITIONER

## 2019-09-24 RX ORDER — TRIAMCINOLONE ACETONIDE 40 MG/ML
40 INJECTION, SUSPENSION INTRA-ARTICULAR; INTRAMUSCULAR
Status: COMPLETED | OUTPATIENT
Start: 2019-09-24 | End: 2019-09-24

## 2019-09-24 RX ADMIN — TRIAMCINOLONE ACETONIDE 40 MG: 40 INJECTION, SUSPENSION INTRA-ARTICULAR; INTRAMUSCULAR at 12:09

## 2019-09-24 NOTE — PROGRESS NOTES
CC: right knee pain    HPI: Pt with c/o right knee pain for the past few weeks. The pain is aching and global. She has a history of left knee replacement, but she isn't interested in surgery at this time and would like to have a cortisone injection. Her last injection was in January. She is taking tylenol prn with little relief. She reports that she has oxycodone which she takes when the pain is severe. She is unable to tolerate nsaids. She is retired. She ambulates with a cane.   Her medical history includes heart failure, diabetes, and chronic back and neck pain. Her last HgbA1c was 7.9. She is followed [facility] and has had JANEE's for that.     ROS  General: denies fever and chills  Resp: no c/o sob  CVS: no c/o cp  MSK: c/o right knee pain which is aching and global and worse with increased activity    PE  General: AAOx3, pleasant and cooperative  Resp: respirations even and unlabored  MSK: right knee exam  0 degrees extension  120 degrees flexion  No warmth or erythema   - effusion  + crepitus    Assessment:  Right knee djd    Plan:  Cortisone injection right knee today  Tylenol prn  RICE  F/u as needed    Knee Injection Procedure Note    Diagnosis: right knee degenerative arthritis  Indications: right knee pain  Procedure Details: Verbal consent was obtained for the procedure. The injection site was identified and the skin was prepared with alcohol. The right knee was injected from an anterolateral approach with 1 ml of Kenalog and 4 ml Lidocaine under sterile technique using a 22 gauge needle. The needle was removed and the area cleansed and dressed.  Complications:  Patient tolerated the procedure well.    she was advised to rest the knee today, using ice and elevation as needed for comfort and swelling.Immediate relief of the knee pain may be short lived and secondary to the lidocaine. she may have an increase in discomfort tonight followed by steady improvement over the next several days. It may  take 1-2 weeks following the injection to get the full benefit of the medication.

## 2019-09-24 NOTE — LETTER
September 24, 2019      Jeannine Arriaga MD  [address]  [facility] [address]           [facility]  [address]  [facility] [address]  Phone: [phone]          Patient: Henrietta Villasenor   MR Number: [id number]   YOB: 1940   Date of Visit: 9/24/2019       Dear Dr. Jeannine Arriaga:    Thank you for referring Henrietta Villasenor to me for evaluation. Attached you will find relevant portions of my assessment and plan of care.    If you have questions, please do not hesitate to call me. I look forward to following Henrietta Villasenor along with you.    Sincerely,    Elizabeth Bello, NP    Enclosure  CC:  No Recipients    If you would like to receive this communication electronically, please contact [email or web address] or [phone] to request more information on [facility] access.    For providers and/or their staff who would like to refer a patient to [facility], please contact us through our one-stop-shop provider referral line, [facility], at [phone].    If you feel you have received this communication in error or would no longer like to receive these types of communications, please e-mail [email or web address]

## 2019-09-25 ENCOUNTER — PATIENT OUTREACH (OUTPATIENT)
Dept: ADMINISTRATIVE | Facility: OTHER | Age: 79
End: 2019-09-25

## 2019-09-25 ENCOUNTER — OFFICE VISIT (OUTPATIENT)
Dept: INTERNAL MEDICINE | Facility: CLINIC | Age: 79
End: 2019-09-25
Payer: MEDICARE

## 2019-09-25 VITALS
DIASTOLIC BLOOD PRESSURE: 90 MMHG | SYSTOLIC BLOOD PRESSURE: 162 MMHG | BODY MASS INDEX: 32.58 KG/M2 | HEIGHT: 68 IN | OXYGEN SATURATION: 99 % | WEIGHT: 214.94 LBS | HEART RATE: 84 BPM

## 2019-09-25 DIAGNOSIS — M62.838 TRAPEZIUS MUSCLE SPASM: ICD-10-CM

## 2019-09-25 DIAGNOSIS — K57.92 DIVERTICULITIS: Primary | ICD-10-CM

## 2019-09-25 PROCEDURE — 99999 PR PBB SHADOW E&M-EST. PATIENT-LVL III: ICD-10-PCS | Mod: PBBFAC,HCNC,, | Performed by: INTERNAL MEDICINE

## 2019-09-25 PROCEDURE — 3080F PR MOST RECENT DIASTOLIC BLOOD PRESSURE >= 90 MM HG: ICD-10-PCS | Mod: HCNC,CPTII,S$GLB, | Performed by: INTERNAL MEDICINE

## 2019-09-25 PROCEDURE — 99214 PR OFFICE/OUTPT VISIT, EST, LEVL IV, 30-39 MIN: ICD-10-PCS | Mod: HCNC,S$GLB,, | Performed by: INTERNAL MEDICINE

## 2019-09-25 PROCEDURE — 99999 PR PBB SHADOW E&M-EST. PATIENT-LVL III: CPT | Mod: PBBFAC,HCNC,, | Performed by: INTERNAL MEDICINE

## 2019-09-25 PROCEDURE — 99214 OFFICE O/P EST MOD 30 MIN: CPT | Mod: HCNC,S$GLB,, | Performed by: INTERNAL MEDICINE

## 2019-09-25 PROCEDURE — 1101F PT FALLS ASSESS-DOCD LE1/YR: CPT | Mod: HCNC,CPTII,S$GLB, | Performed by: INTERNAL MEDICINE

## 2019-09-25 PROCEDURE — 3077F SYST BP >= 140 MM HG: CPT | Mod: HCNC,CPTII,S$GLB, | Performed by: INTERNAL MEDICINE

## 2019-09-25 PROCEDURE — 3077F PR MOST RECENT SYSTOLIC BLOOD PRESSURE >= 140 MM HG: ICD-10-PCS | Mod: HCNC,CPTII,S$GLB, | Performed by: INTERNAL MEDICINE

## 2019-09-25 PROCEDURE — 3080F DIAST BP >= 90 MM HG: CPT | Mod: HCNC,CPTII,S$GLB, | Performed by: INTERNAL MEDICINE

## 2019-09-25 PROCEDURE — 1101F PR PT FALLS ASSESS DOC 0-1 FALLS W/OUT INJ PAST YR: ICD-10-PCS | Mod: HCNC,CPTII,S$GLB, | Performed by: INTERNAL MEDICINE

## 2019-09-25 RX ORDER — TIZANIDINE 2 MG/1
4 TABLET ORAL EVERY 8 HOURS PRN
Qty: 30 TABLET | Refills: 0 | Status: SHIPPED | OUTPATIENT
Start: 2019-09-25 | End: 2019-10-05

## 2019-09-25 RX ORDER — CIPROFLOXACIN 500 MG/1
500 TABLET ORAL 2 TIMES DAILY
Qty: 20 TABLET | Refills: 0 | Status: SHIPPED | OUTPATIENT
Start: 2019-09-25 | End: 2019-10-05

## 2019-09-25 NOTE — PROGRESS NOTES
Subjective:       Patient ID: Henrietta Villasenor is a 79 y.o. female.    Chief Complaint: Diarrhea; Neck Pain; and Cough    79 year old lady gives difficult to follow story of constipation for which she took miralax which didn't help so she gave herself an enema and then she had diarrhea.  Has been under lots of stress with family illness and death and traveling to sick relatives and funerals in [place] and [place]  Feels better but has LLQ pain.  Also complains of pain in her right lateral and posterior neck    Review of Systems   Constitutional: Negative for activity change, chills, fatigue and fever.   HENT: Negative for congestion, ear pain, nosebleeds, postnasal drip, sinus pressure and sore throat.    Eyes: Negative.  Negative for visual disturbance.   Respiratory: Negative for cough, chest tightness, shortness of breath and wheezing.    Cardiovascular: Negative for chest pain.   Gastrointestinal: Negative for abdominal pain, diarrhea, nausea and vomiting.   Genitourinary: Negative for difficulty urinating, dysuria, frequency and urgency.   Musculoskeletal: Negative for arthralgias and neck stiffness.   Skin: Negative for rash.   Neurological: Negative for dizziness, weakness and headaches.   Psychiatric/Behavioral: Negative for sleep disturbance. The patient is not nervous/anxious.        Objective:      Physical Exam   Constitutional: She is oriented to person, place, and time. She appears well-developed and well-nourished.  Non-toxic appearance. No distress.   HENT:   Head: Normocephalic and atraumatic.   Right Ear: Tympanic membrane, external ear and ear canal normal.   Left Ear: Tympanic membrane, external ear and ear canal normal.   Eyes: Pupils are equal, round, and reactive to light. EOM are normal. No scleral icterus.   Neck: Normal range of motion. Neck supple. No thyromegaly present.       Cardiovascular: Normal rate, regular rhythm and normal heart sounds.   Pulmonary/Chest: Effort normal and breath  sounds normal.   Abdominal: Soft. Bowel sounds are normal. She exhibits no mass. There is tenderness in the left lower quadrant. There is no rigidity, no rebound, no guarding and no CVA tenderness.   Musculoskeletal: Normal range of motion.   Lymphadenopathy:     She has no cervical adenopathy.   Neurological: She is alert and oriented to person, place, and time. She has normal reflexes. She displays normal reflexes. No cranial nerve deficit. She exhibits normal muscle tone. Coordination normal.   Skin: Skin is warm and dry.   Psychiatric: She has a normal mood and affect. Her behavior is normal.       Assessment:       1. Diverticulitis    2. Trapezius muscle spasm        Plan:   Henrietta was seen today for diarrhea, neck pain and cough.    Diagnoses and all orders for this visit:    Diverticulitis    Trapezius muscle spasm    Other orders  -     ciprofloxacin HCl (CIPRO) 500 MG tablet; Take 1 tablet (500 mg total) by mouth 2 (two) times daily for 10 days  -     tiZANidine (ZANAFLEX) 2 MG tablet; Take 2 tablets (4 mg total) by mouth every 8 (eight) hours as needed (muscle spasm).

## 2019-09-30 ENCOUNTER — TELEPHONE (OUTPATIENT)
Dept: PAIN MEDICINE | Facility: CLINIC | Age: 79
End: 2019-09-30

## 2019-09-30 NOTE — TELEPHONE ENCOUNTER
My name is Staff, I am contacting you from [facility] regarding your appointment scheduled for 10.01.19, with CITLALI, just confirming you will be able to make it.    If you feel you need to reschedule or canceled please give our office a call so we can better assist you.      Staff requesting patient to arrive 15 mins ahead of schedule appointment time.    Pt verbalized understanding and has confirmed appt

## 2019-10-01 ENCOUNTER — PATIENT OUTREACH (OUTPATIENT)
Dept: ADMINISTRATIVE | Facility: OTHER | Age: 79
End: 2019-10-01

## 2019-10-01 ENCOUNTER — OFFICE VISIT (OUTPATIENT)
Dept: PAIN MEDICINE | Facility: CLINIC | Age: 79
End: 2019-10-01
Payer: MEDICARE

## 2019-10-01 VITALS
HEART RATE: 68 BPM | HEIGHT: 68 IN | WEIGHT: 217.81 LBS | BODY MASS INDEX: 33.01 KG/M2 | SYSTOLIC BLOOD PRESSURE: 184 MMHG | OXYGEN SATURATION: 100 % | DIASTOLIC BLOOD PRESSURE: 96 MMHG | RESPIRATION RATE: 18 BRPM | TEMPERATURE: 98 F

## 2019-10-01 DIAGNOSIS — M51.36 DDD (DEGENERATIVE DISC DISEASE), LUMBAR: ICD-10-CM

## 2019-10-01 DIAGNOSIS — M47.816 LUMBAR SPONDYLOSIS: ICD-10-CM

## 2019-10-01 DIAGNOSIS — M53.3 SACROILIAC JOINT PAIN: ICD-10-CM

## 2019-10-01 DIAGNOSIS — M54.16 LUMBAR RADICULOPATHY: Primary | ICD-10-CM

## 2019-10-01 PROCEDURE — 1101F PR PT FALLS ASSESS DOC 0-1 FALLS W/OUT INJ PAST YR: ICD-10-PCS | Mod: HCNC,CPTII,S$GLB, | Performed by: NURSE PRACTITIONER

## 2019-10-01 PROCEDURE — 99214 OFFICE O/P EST MOD 30 MIN: CPT | Mod: HCNC,S$GLB,, | Performed by: NURSE PRACTITIONER

## 2019-10-01 PROCEDURE — 3077F SYST BP >= 140 MM HG: CPT | Mod: HCNC,CPTII,S$GLB, | Performed by: NURSE PRACTITIONER

## 2019-10-01 PROCEDURE — 3080F PR MOST RECENT DIASTOLIC BLOOD PRESSURE >= 90 MM HG: ICD-10-PCS | Mod: HCNC,CPTII,S$GLB, | Performed by: NURSE PRACTITIONER

## 2019-10-01 PROCEDURE — 99214 PR OFFICE/OUTPT VISIT, EST, LEVL IV, 30-39 MIN: ICD-10-PCS | Mod: HCNC,S$GLB,, | Performed by: NURSE PRACTITIONER

## 2019-10-01 PROCEDURE — 1101F PT FALLS ASSESS-DOCD LE1/YR: CPT | Mod: HCNC,CPTII,S$GLB, | Performed by: NURSE PRACTITIONER

## 2019-10-01 PROCEDURE — 99999 PR PBB SHADOW E&M-EST. PATIENT-LVL IV: CPT | Mod: PBBFAC,HCNC,, | Performed by: NURSE PRACTITIONER

## 2019-10-01 PROCEDURE — 3077F PR MOST RECENT SYSTOLIC BLOOD PRESSURE >= 140 MM HG: ICD-10-PCS | Mod: HCNC,CPTII,S$GLB, | Performed by: NURSE PRACTITIONER

## 2019-10-01 PROCEDURE — 3080F DIAST BP >= 90 MM HG: CPT | Mod: HCNC,CPTII,S$GLB, | Performed by: NURSE PRACTITIONER

## 2019-10-01 PROCEDURE — 99999 PR PBB SHADOW E&M-EST. PATIENT-LVL IV: ICD-10-PCS | Mod: PBBFAC,HCNC,, | Performed by: NURSE PRACTITIONER

## 2019-10-01 NOTE — PROGRESS NOTES
Chronic Pain - Established Visit    Referring Physician: No ref. provider found    Chief Complaint:   No chief complaint on file.       SUBJECTIVE:    Interval History 10/1/2019:  The patient is here for follow up today of back and leg pain.  She is s/p bilateral L4-5 TF JANEE with 50% relief.  She says that her pain is more tolerable.  She has more pain with walking, such as walking to our office today.  She has persistent burning to both feet.  She has a known history of diabetes.  Her pain is worse at night and she describes it as burning.  She has intermittent radiation down the sides of both legs.  Her back pain is greater than her leg pain.  She takes Percocet from her PCP with benefit.  She would like to repeat Healthy Back.  She feels as though this was helpful and would like to restart.  Her pain today is 7/10.  Her blood pressure is elevated today.  She says that she has not taken her medication today because she was rushing to get to her appointment.  She denies headache, SOB, or chest pain.     Initial Encounter:  Henrietta Villasenor presents to the clinic for the evaluation of low back pain. The pain started and symptoms have been worsening.The pain is located in the low back area and radiates to the bilateral leg swelling.  The pain is described as sharp, shooting, throbbing, tingling and constant and is rated as 5/10. The pain is rated with a score of  5/10 on the BEST day and a score of 8/10 on the WORST day.  Symptoms interfere with daily activity and sleeping. The pain is exacerbated by Standing, Bending, Coughing/Sneezing, Walking, Morning, Lifting and Getting out of bed/chair.  The pain is mitigated by medications. She reports spending 0 hours per day reclining. The patient reports 2 hours of uninterrupted sleep per night.    Patient denies night fever/night sweats, urinary incontinence, bowel incontinence, significant weight loss, significant motor weakness and loss of sensations.    Physical  Therapy/Home Exercise: yes      Pain Disability Index Review:  Last 3 PDI Scores 10/1/2019 2019   Pain Disability Index (PDI) 50 49       Pain Medications:    - Opioids: Percocet (Oxycodone/Acetaminophen)  - Adjuvant Medications: Cymbalta ( Duloxetine) and Tizanidine  - Anti-Coagulants: None  - Others: None     report:  Reviewed and consistent with medication use as prescribed.    Pain Procedures:   19 Bilateral L4-5 TF JANEE- 50% relief    Imagin18 MRI Lumbar  Narrative     EXAMINATION:  MRI LUMBAR SPINE WITHOUT CONTRAST    CLINICAL HISTORY:  low back and right leg pain; Other intervertebral disc degeneration, lumbar region    TECHNIQUE:  Multiplanar, multisequence MR images were acquired from the thoracolumbar junction to the sacrum without the administration of contrast.    COMPARISON:  Radiograph 2018    FINDINGS:  Alignment: Normal.    Vertebrae: Normal marrow signal. No fracture.    Discs: Normal height and signal.    Cord: Normal.  Conus terminates at superior border of L1    Degenerative findings:    T12-L1: Mild broad-based disc bulge without compressive sequelae.    L1-L2: Unremarkable.    L2-L3: Mild broad-based disc bulge with facet arthropathy.  No compressive sequelae.    L3-L4: Broad-based disc bulge and facet arthropathy.  There is mild bilateral neural foraminal narrowing.    L4-L5: Trace anterolisthesis of L4 on L5 with broad-based disc bulge and facet arthropathy.  This results in moderate bilateral neural foraminal narrowing.  Moderate canal stenosis.  There is also significant narrowing of the left greater than right lateral recesses.    L5-S1: Broad-based disc bulge without significant compressive sequelae.    Paraspinal muscles & soft tissues: Paraspinal atrophy.  No concerning findings in the visualized abdomen.  Add      Impression       Moderate degenerative change at L4-5.  Milder degenerative changes elsewhere in the lumbar spine.     18 Xray  Lumbar  Narrative     EXAMINATION:  XR LUMBAR SPINE 5 VIEW WITH FLEX AND EXT    CLINICAL HISTORY:  low back and right leg pain;  Other intervertebral disc degeneration, lumbar region    TECHNIQUE:  Five views of the lumbar spine plus flexion extension views were performed.    COMPARISON:  02/03/2015    FINDINGS:  The lumbar vertebra are intact.  No compression fracture is identified.  Degenerative changes are again seen with small osteophytes at multiple levels.  L4-5 disc space is mildly narrowed.  Degenerative changes are also noted at lower facet joints.  There is associated grade 1 spondylolisthesis at L4-5 which is stable between flexion and extension at about 6 mm.  Alignment is otherwise satisfactory.    Visualized abdomen shows aortic atherosclerosis and surgical clips in the right upper quadrant.      Impression       No acute abnormality.  Chronic degenerative spine changes.           Past Medical History:   Diagnosis Date    Abnormality of lung 11/08/2011    Stable bandlike opacities at the lung bases, most likely representing      Anxiety     Arthritis     CAD (coronary artery disease)     Calculus of ureter 2/22/2011    Cataract     CHF (congestive heart failure)     Chronic back pain 7/29/2012    Colon polyp 9/2010; 11/2010    Colon polyps 7/29/2012    Coronary artery disease involving native coronary artery of native heart with angina pectoris     Depression     Diabetes mellitus     Diabetes mellitus type II     Diverticulitis large intestine 7/27/2015    Diverticulosis 09/25/2010; 11/02/2010; 11/08/2011; 7/29/2012    Duodenal disorder 08/25/2011    Duodenal erosion noted on EGD.    Duodenal ulcer, unspecified as acute or chronic, without hemorrhage, perforation, or obstruction 8/24/2011    E. coli sepsis 12/2010    Due to left ureteral stone with left nephrostomy tube - hospitalized in [place]    Esophageal dysmotility 01/24/2012    Noted on upper GI-barium swallow.    Facial  weakness 1969    Left facial weakness s/p left mastoidectomy in ~ 1969.    Fatty liver 11/08/2011    Reported on CT-abdomen and in 06/2012 Gastro clinic visit note.    Gastric polyp 09/29/2010    GERD (gastroesophageal reflux disease) 7/29/2012    Hepatomegaly 11/08/2011    Reported on CT-abdomen    Herpes zoster with other nervous system complications(053.19) 2/28/2011    Hiatal hernia 06/26/2006; 09/29/2010; 08/25/2011    Noted on barium swallow 2006; noted on  EGD 2011.   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    Trouble in sleeping     Type II or unspecified type diabetes mellitus with neurological manifestations, not stated as uncontrolled(250.60)     Type II or unspecified type diabetes mellitus with peripheral circulatory disorders, not stated as uncontrolled(250.70)     Type II or unspecified type diabetes mellitus with renal manifestations, not stated as uncontrolled(250.40)      Past Surgical History:   Procedure Laterality Date    BELPHAROPTOSIS REPAIR      s/p LAMBERTO levator repair - Dr. Dejesus    CARDIAC CATHETERIZATION      CARPAL TUNNEL RELEASE  3/13/2012    CATARACT EXTRACTION      CHOLECYSTECTOMY      COLONOSCOPY N/A 11/16/2016    Procedure: COLONOSCOPY;  Surgeon: Chris Storey,  MD;  Location: [facility] (4TH FLR);  Service: Endoscopy;  Laterality: N/A;    COLONOSCOPY N/A 2017    Procedure: COLONOSCOPY;  Surgeon: Chris Storey MD;  Location: [facility] ENDO (4TH FLR);  Service: Endoscopy;  Laterality: N/A;  colonoscopy in 3 months with better bowel prep. - Split PEG prep ordered    EXTRACORPOREAL SHOCK WAVE LITHOTRIPSY      INTRAOCULAR PROSTHESES INSERTION Right 10/31/2018    Procedure: INSERTION-INTRAOCULAR LENS (IOL);  Surgeon: Keysha Valle MD;  Location: [facility] OR;  Service: Ophthalmology;  Laterality: Right;    JOINT REPLACEMENT      mastoid tumor removal      Left mastoidectomy with residual left facial weakness.    NEPHROSTOMY      OOPHORECTOMY      PHACOEMULSIFICATION OF CATARACT Right 10/31/2018    Procedure: PHACOEMULSIFICATION-ASPIRATION-CATARACT;  Surgeon: Keysha Valle MD;  Location: [facility] OR;  Service: Ophthalmology;  Laterality: Right;    TOTAL ABDOMINAL HYSTERECTOMY  1969    TAHBSO    TOTAL KNEE ARTHROPLASTY  2012    Left    TRANSFORAMINAL EPIDURAL INJECTION OF STEROID Bilateral 2019    Procedure: INJECTION, STEROID, EPIDURAL, TRANSFORAMINAL APPROACH;  Surgeon: Oscar Ravi MD;  Location: [facility] PAIN MGT;  Service: Pain Management;  Laterality: Bilateral;  B/L TFESI L4/L5     Social History     Socioeconomic History    Marital status: Single     Spouse name: Not on file    Number of children: 5    Years of education: Not on file    Highest education level: Not on file   Occupational History    Not on file   Social Needs    Financial resource strain: Not on file    Food insecurity:     Worry: Not on file     Inability: Not on file    Transportation needs:     Medical: Not on file     Non-medical: Not on file   Tobacco Use    Smoking status: Former Smoker     Packs/day: 1.00     Years: 15.00     Pack years: 15.00     Types: Cigarettes     Last attempt to quit: 2000     Years since quittin.2    Smokeless tobacco: Never Used    Substance and Sexual Activity    Alcohol use: No    Drug use: No    Sexual activity: Never   Lifestyle    Physical activity:     Days per week: Not on file     Minutes per session: Not on file    Stress: Not on file   Relationships    Social connections:     Talks on phone: Not on file     Gets together: Not on file     Attends [employer] service: Not on file     Active member of club or organization: Not on file     Attends meetings of clubs or organizations: Not on file     Relationship status: Not on file   Other Topics Concern    Are you pregnant or think you may be? Not Asked    Breast-feeding Not Asked   Social History Narrative    Single with 5 children. Lives alone. Retired cook.     Family History   Problem Relation Age of Onset    Sleep apnea Sister     Diabetes Sister     Cancer Mother         brain tumor    Hypertension Mother     Heart disease Father     Heart attack Father     Dementia Brother     Lupus Brother     Frontotemporal dementia Brother     No Known Problems Daughter     No Known Problems Son     Diabetes Sister     Lupus Sister     Breast cancer Sister     Diabetes Sister     Cancer Sister         breast cancer    Heart attack Sister     Diabetes Sister     Liver cancer Brother     Drug abuse Brother     Alcohol abuse Brother     Cirrhosis Brother     Drug abuse Brother     No Known Problems Daughter     No Known Problems Son     No Known Problems Son     No Known Problems Maternal Grandmother     No Known Problems Maternal Grandfather     No Known Problems Paternal Grandmother     No Known Problems Paternal Grandfather     Diabetes Brother     Dementia Brother     Diabetes Other     Ovarian cancer Other         Niece     Breast cancer Maternal Aunt     No Known Problems Maternal Uncle     No Known Problems Paternal Aunt     No Known Problems Paternal Uncle     Glaucoma Neg Hx     Blindness Neg Hx     Celiac disease Neg Hx     Colon cancer  "Neg Hx     Colon polyps Neg Hx     Esophageal cancer Neg Hx     Inflammatory bowel disease Neg Hx     Liver disease Neg Hx     Rectal cancer Neg Hx     Stomach cancer Neg Hx     Ulcerative colitis Neg Hx     Melanoma Neg Hx     Multiple sclerosis Neg Hx     Psoriasis Neg Hx     Amblyopia Neg Hx     Cataracts Neg Hx     Macular degeneration Neg Hx     Retinal detachment Neg Hx     Strabismus Neg Hx     Stroke Neg Hx     Thyroid disease Neg Hx        Review of patient's allergies indicates:   Allergen Reactions    Crestor [rosuvastatin]      Cramping in legs    Ezetimibe      Other reaction(s): abdominal pain, Diarrhea  Other reaction(s): abdominal pain    Hydrocodone      Other reaction(s): Itching    Lisinopril      Other reaction(s): cough      Sulfa (sulfonamide antibiotics)      Itching and Rash      Sulfamethoxazole     Sulfamethoxazole-trimethoprim     Trimethoprim     Valsartan      Other reaction(s): Angioedema       Current Outpatient Medications   Medication Sig    ACCU-CHEK FASTCLIX Misc     ACCU-CHEK SMARTVIEW TEST STRIP Strp TEST THREE TIMES DAILY    albuterol (VENTOLIN HFA) 90 mcg/actuation inhaler Inhale 2 puffs by mouth into the lungs every 4 (four) hours as needed for Wheezing. Rescue    ALCOHOL ANTISEPTIC PADS (ALCOHOL PREP PADS TOP)     allopurinol (ZYLOPRIM) 100 MG tablet Take 1 tablet (100 mg total) by mouth once daily.    aspirin (ECOTRIN) 81 MG EC tablet Take 1 tablet by mouth Daily.    azelastine (ASTELIN) 137 mcg (0.1 %) nasal spray Spray 1 spray (137 mcg total) by Nasal route 2 (two) times daily.    BD ALCOHOL SWABS PadM USE  WHEN  TESTING BLOOD SUGAR FOUR TIMES DAILY    BD ULTRA-FINE OLU PEN NEEDLES 32 x 5/32 " Ndle Uses 4 times daily, on multiple daily insulin injections    carbamide peroxide (EAR WAX REMOVAL DROPS OTIC)     carvedilol (COREG) 25 MG tablet TAKE 1 TABLET TWICE DAILY    cholecalciferol, vitamin D3, (VITAMIN D3) 400 unit Cap Take 400 " "Units by mouth once daily.    ciprofloxacin HCl (CIPRO) 500 MG tablet Take 1 tablet (500 mg total) by mouth 2 (two) times daily for 10 days    DULoxetine (CYMBALTA) 60 MG capsule Take 1 capsule (60 mg total) by mouth 2 (two) times daily.    fluticasone (FLONASE) 50 mcg/actuation nasal spray SHAKE LQ AND U 2 SPRAYS IEN QD    furosemide (LASIX) 40 MG tablet TAKE 1 TABLET BY MOUTH TWICE DAILY    insulin aspart (NOVOLOG) 100 unit/mL InPn pen 23 units with meals plus correction scale. Max daily dose=120 units (Patient taking differently: Inject 23 Units into the skin 3 (three) times daily with meals. 23 units with meals plus correction scale. Max daily dose=120 units)    Lactobacillus acidophilus (PROBIOTIC) 10 billion cell Cap Take 1 capsule by mouth once daily.    LANTUS SOLOSTAR U-100 INSULIN glargine 100 units/mL (3mL) SubQ pen INJECT 60 UNITS INTO THE SKIN EVERY EVENING.    loratadine (CLARITIN) 10 mg tablet TAKE 1 TABLET EVERY DAY    MUSCLE RUB, WITH CAMPHOR, 4-30-10 % Crea     nitroGLYCERIN (NITROSTAT) 0.4 MG SL tablet Place 1 tablet (0.4 mg total) under the tongue every 5 (five) minutes as needed.    olopatadine (PATADAY) 0.2 % Drop Place 1 drop into both eyes once daily.    ondansetron (ZOFRAN) 4 MG tablet Take 1 tablet (4 mg total) by mouth every 8 (eight) hours as needed for Nausea.    oxyCODONE-acetaminophen (PERCOCET)  mg per tablet Take one tablet by mouth 5 times daily    pantoprazole (PROTONIX) 40 MG tablet TAKE 1 TABLET(40 MG) BY MOUTH twice daily    pen needle, diabetic (SURE-FINE PEN NEEDLES) 29 gauge x 1/2" Ndle Use 4 times daily    potassium chloride SA (K-DUR,KLOR-CON) 10 MEQ tablet Take 1 tablet (10 mEq total) by mouth 2 (two) times daily.    promethazine-dextromethorphan (PROMETHAZINE-DM) 6.25-15 mg/5 mL Syrp 2 tsp every 8 hours as needed for cough    tiZANidine (ZANAFLEX) 2 MG tablet Take 2 tablets (4 mg total) by mouth every 8 (eight) hours as needed (muscle spasm).    " "tiZANidine (ZANAFLEX) 4 MG tablet Take 0.5-1 tablets (2-4 mg total) by mouth every 8 (eight) hours.    TRUEPLUS LANCETS 33 gauge Misc TEST BLOOD SUGAR FOUR TIMES DAILY    triamcinolone acetonide 0.1% (KENALOG) 0.1 % cream Apply topically 3 (three) times daily.     No current facility-administered medications for this visit.      Facility-Administered Medications Ordered in Other Visits   Medication    tetracaine HCl (PF) 0.5 % Drop 1 drop       REVIEW OF SYSTEMS:    GENERAL:  No weight loss, malaise or fevers.  HEENT:  Negative for frequent or significant headaches.  NECK:  Negative for lumps, goiter, pain and significant neck swelling.  RESPIRATORY:  Negative for cough, wheezing or shortness of breath. MARLON.  CARDIOVASCULAR:  Negative for chest pain, leg swelling or palpitations.  CAD and CHF.  GI:  Negative for abdominal discomfort, blood in stools or black stools or change in bowel habits. GERD.  MUSCULOSKELETAL:  Lower back and joint pain  SKIN:  Negative for lesions, rash, and itching.  PSYCH:  Negative for sleep disturbance, mood disorder and recent psychosocial stressors.  HEMATOLOGY/LYMPHOLOGY:  Negative for prolonged bleeding, bruising easily or swollen nodes. Anemia.  NEURO:   No history of headaches, syncope, paralysis, seizures or tremors.  All other reviewed and negative other than HPI.    OBJECTIVE:    BP (!) 201/109 Comment: PT WALKED 2 BLOCKS DUE TO PARKING  Pulse 68   Temp 97.8 °F (36.6 °C)   Resp 18   Ht 5' 8" (1.727 m)   Wt 98.8 kg (217 lb 13 oz)   LMP  (LMP Unknown)   SpO2 100%   BMI 33.12 kg/m²     PHYSICAL EXAMINATION:    General appearance: Well appearing, in no acute distress, alert and oriented x3.  Psych:  Mood and affect appropriate.  Skin: Skin color, texture, turgor normal, no rashes or lesions, in both upper and lower body.  Head/face:  Normocephalic, atraumatic. No palpable lymph nodes.  Cor: RRR  Pulm: CTA  GI:  Soft and non-tender.  Back: Straight leg raising in the sitting " and supine positions is negative to radicular pain.  Positive tenderness to palpation over bilateral lumbar facet joints.  Limited ROM on flexion and extension with pain.  Positive facet loading bilaterally.  Extremities: Peripheral joint ROM is full and pain free without obvious instability or laxity in all four extremities. No deformities, edema, or skin discoloration. Good capillary refill.  Musculoskeletal: Bilateral upper and lower extremity strength is normal and symmetric.  No atrophy or tone abnormalities are noted.  Neuro: Bilateral upper and lower extremity coordination and muscle stretch reflexes are physiologic and symmetric.  Plantar response are downgoing. No loss of sensation is noted.  Gait: Antalgic.    ASSESSMENT: 79 y.o. year old female with chronic lower back pain and leg pain consistent with the following diagnoses:    1. Lumbar radiculopathy  Ambulatory consult to [facility]   2. DDD (degenerative disc disease), lumbar  Ambulatory consult to [facility]   3. Sacroiliac joint pain  Ambulatory consult to [facility]   4. Lumbar spondylosis  Ambulatory consult to [facility]         PLAN:     - I have stressed the importance of physical activity and a home exercise plan to help with pain and improve health.  - Previous imaging was reviewed and discussed with the patient today.  - She is s/p bilateral Transforaminal epidural steroid injection at L4/L5 with some benefit.  Consider bilateral L3,4,5 MBB/RFA vs repeat JANEE in the future.  She declined at this time.  - She previously stopped Healthy Back and would like to restart this.  - RTC in 6-8 weeks or sooner if needed.  - Counseled patient regarding the importance of physical therapy.    - Regarding elevated BP, I recommended that she be evaluated in the ED as she has a remote history of MI.  She declined.  She was informed to contact her PCP.  She denies chest pain, SOB or headache today.  She will take her  medication as soon as possible and monitor her blood pressure.    The above plan and management options were discussed at length with patient. Patient is in agreement with the above and verbalized understanding.     Maria R Haskins  10/01/2019

## 2019-10-10 RX ORDER — OXYCODONE AND ACETAMINOPHEN 10; 325 MG/1; MG/1
TABLET ORAL
Qty: 150 TABLET | Refills: 0 | Status: SHIPPED | OUTPATIENT
Start: 2019-10-10 | End: 2019-11-07 | Stop reason: SDUPTHER

## 2019-10-10 NOTE — TELEPHONE ENCOUNTER
----- Message from Amina Swartz sent at 10/10/2019  3:03 PM CDT -----  Contact: self/[phone]  Patient is calling for an RX refill or new RX.  Is this a refill or new RX:    RX name and strength:   Directions (copy/paste from chart):    Is this a 30 day or 90 day RX:    Local pharmacy or mail order pharmacy:    Pharmacy name and phone # (copy/paste from chart):     Comments:        Patient is calling for an RX refill or new RX.  Is this a refill or new RX:  Refill 1  RX name and strength: oxyCODONE-acetaminophen (PERCOCET)  mg per tablet  Directions (copy/paste from chart):    Is this a 30 day or 90 day RX: 150   Local pharmacy or mail order pharmacy:  Local pharmacy  Pharmacy name and phone # (copy/paste from chart): [facility] [phone] (Phone) [phone] (Fax)    Comments:

## 2019-10-17 DIAGNOSIS — E11.42 TYPE 2 DIABETES MELLITUS WITH DIABETIC POLYNEUROPATHY, WITH LONG-TERM CURRENT USE OF INSULIN: ICD-10-CM

## 2019-10-17 DIAGNOSIS — Z79.4 TYPE 2 DIABETES MELLITUS WITH DIABETIC POLYNEUROPATHY, WITH LONG-TERM CURRENT USE OF INSULIN: ICD-10-CM

## 2019-10-17 RX ORDER — CARVEDILOL 25 MG/1
TABLET ORAL
Qty: 180 TABLET | Refills: 3 | Status: SHIPPED | OUTPATIENT
Start: 2019-10-17 | End: 2020-05-15 | Stop reason: SDUPTHER

## 2019-10-17 RX ORDER — INSULIN GLARGINE 100 [IU]/ML
INJECTION, SOLUTION SUBCUTANEOUS
Qty: 60 ML | Refills: 1 | Status: SHIPPED | OUTPATIENT
Start: 2019-10-17 | End: 2020-07-01

## 2019-10-22 ENCOUNTER — IMMUNIZATION (OUTPATIENT)
Dept: INTERNAL MEDICINE | Facility: CLINIC | Age: 79
End: 2019-10-22
Payer: MEDICARE

## 2019-10-22 ENCOUNTER — OFFICE VISIT (OUTPATIENT)
Dept: INTERNAL MEDICINE | Facility: CLINIC | Age: 79
End: 2019-10-22
Payer: MEDICARE

## 2019-10-22 VITALS
BODY MASS INDEX: 32.74 KG/M2 | HEIGHT: 68 IN | OXYGEN SATURATION: 96 % | DIASTOLIC BLOOD PRESSURE: 80 MMHG | WEIGHT: 216.06 LBS | TEMPERATURE: 98 F | HEART RATE: 79 BPM | SYSTOLIC BLOOD PRESSURE: 138 MMHG

## 2019-10-22 DIAGNOSIS — R42 VERTIGO: ICD-10-CM

## 2019-10-22 DIAGNOSIS — I10 ESSENTIAL HYPERTENSION: Primary | Chronic | ICD-10-CM

## 2019-10-22 DIAGNOSIS — E78.5 HYPERLIPIDEMIA, UNSPECIFIED HYPERLIPIDEMIA TYPE: ICD-10-CM

## 2019-10-22 DIAGNOSIS — E11.42 TYPE 2 DIABETES MELLITUS WITH DIABETIC POLYNEUROPATHY, WITH LONG-TERM CURRENT USE OF INSULIN: ICD-10-CM

## 2019-10-22 DIAGNOSIS — F39 MOOD DISORDER: ICD-10-CM

## 2019-10-22 DIAGNOSIS — Z79.4 TYPE 2 DIABETES MELLITUS WITH DIABETIC POLYNEUROPATHY, WITH LONG-TERM CURRENT USE OF INSULIN: ICD-10-CM

## 2019-10-22 DIAGNOSIS — J30.9 ALLERGIC RHINITIS: ICD-10-CM

## 2019-10-22 PROCEDURE — 99214 PR OFFICE/OUTPT VISIT, EST, LEVL IV, 30-39 MIN: ICD-10-PCS | Mod: 25,HCNC,S$GLB, | Performed by: INTERNAL MEDICINE

## 2019-10-22 PROCEDURE — 3288F FALL RISK ASSESSMENT DOCD: CPT | Mod: HCNC,CPTII,S$GLB, | Performed by: INTERNAL MEDICINE

## 2019-10-22 PROCEDURE — 99999 PR PBB SHADOW E&M-EST. PATIENT-LVL V: ICD-10-PCS | Mod: PBBFAC,HCNC,, | Performed by: INTERNAL MEDICINE

## 2019-10-22 PROCEDURE — 3079F PR MOST RECENT DIASTOLIC BLOOD PRESSURE 80-89 MM HG: ICD-10-PCS | Mod: HCNC,CPTII,S$GLB, | Performed by: INTERNAL MEDICINE

## 2019-10-22 PROCEDURE — 99999 PR PBB SHADOW E&M-EST. PATIENT-LVL V: CPT | Mod: PBBFAC,HCNC,, | Performed by: INTERNAL MEDICINE

## 2019-10-22 PROCEDURE — G0008 FLU VACCINE - HIGH DOSE (65+) PRESERVATIVE FREE IM: ICD-10-PCS | Mod: HCNC,S$GLB,, | Performed by: INTERNAL MEDICINE

## 2019-10-22 PROCEDURE — 90662 FLU VACCINE - HIGH DOSE (65+) PRESERVATIVE FREE IM: ICD-10-PCS | Mod: HCNC,S$GLB,, | Performed by: INTERNAL MEDICINE

## 2019-10-22 PROCEDURE — 99214 OFFICE O/P EST MOD 30 MIN: CPT | Mod: 25,HCNC,S$GLB, | Performed by: INTERNAL MEDICINE

## 2019-10-22 PROCEDURE — 1100F PR PT FALLS ASSESS DOC 2+ FALLS/FALL W/INJURY/YR: ICD-10-PCS | Mod: HCNC,CPTII,S$GLB, | Performed by: INTERNAL MEDICINE

## 2019-10-22 PROCEDURE — 3075F SYST BP GE 130 - 139MM HG: CPT | Mod: HCNC,CPTII,S$GLB, | Performed by: INTERNAL MEDICINE

## 2019-10-22 PROCEDURE — 3288F PR FALLS RISK ASSESSMENT DOCUMENTED: ICD-10-PCS | Mod: HCNC,CPTII,S$GLB, | Performed by: INTERNAL MEDICINE

## 2019-10-22 PROCEDURE — 3079F DIAST BP 80-89 MM HG: CPT | Mod: HCNC,CPTII,S$GLB, | Performed by: INTERNAL MEDICINE

## 2019-10-22 PROCEDURE — 90662 IIV NO PRSV INCREASED AG IM: CPT | Mod: HCNC,S$GLB,, | Performed by: INTERNAL MEDICINE

## 2019-10-22 PROCEDURE — G0008 ADMIN INFLUENZA VIRUS VAC: HCPCS | Mod: HCNC,S$GLB,, | Performed by: INTERNAL MEDICINE

## 2019-10-22 PROCEDURE — 1100F PTFALLS ASSESS-DOCD GE2>/YR: CPT | Mod: HCNC,CPTII,S$GLB, | Performed by: INTERNAL MEDICINE

## 2019-10-22 PROCEDURE — 3075F PR MOST RECENT SYSTOLIC BLOOD PRESS GE 130-139MM HG: ICD-10-PCS | Mod: HCNC,CPTII,S$GLB, | Performed by: INTERNAL MEDICINE

## 2019-10-22 RX ORDER — ROPINIROLE 0.5 MG/1
0.5 TABLET, FILM COATED ORAL NIGHTLY
Qty: 30 TABLET | Refills: 3 | Status: SHIPPED | OUTPATIENT
Start: 2019-10-22 | End: 2020-01-08 | Stop reason: SDUPTHER

## 2019-10-22 RX ORDER — MECLIZINE HCL 12.5 MG 12.5 MG/1
12.5 TABLET ORAL 3 TIMES DAILY PRN
Qty: 30 TABLET | Refills: 0 | Status: SHIPPED | OUTPATIENT
Start: 2019-10-22 | End: 2019-11-18 | Stop reason: SDUPTHER

## 2019-10-22 RX ORDER — LORATADINE 10 MG/1
10 TABLET ORAL DAILY
Qty: 30 TABLET | Refills: 4 | Status: SHIPPED | OUTPATIENT
Start: 2019-10-22 | End: 2022-12-27 | Stop reason: SDUPTHER

## 2019-10-22 NOTE — PROGRESS NOTES
CHIEF COMPLAINT: Follow up of multiple issues     HISTORY OF PRESENT ILLNESS: This is a 79-year-old woman who presents for follow up of above    She has been dizzy the last week.  The room spins at times. Her ears feel full. No drainage, sinus congestion, sore throat, fever, chills.     She has right knee that comes and goes. She saw orthopedics on 9/24/19.    She saw Dr Ty on 9/25/19.  She took a course of cipro for diverticulitis. Abdominal pain is better. Bowels are better. Diarrhea resolved. NO cough, chest pain, palpitations, shortness of breath.        She has been taking furosemide 40 mg once daily, coreg 25 mg twice daily for her hypertension.      She has been taking pantoprazole 40 mg twice daily. Food is not getting stuck in her throat. Occasional nausea.  She continues to have intermittent abdominal cramping. No vomiting, constipation, diarrhea.  Occasional soft or watery stools.  Stool has been dark the last week. She has also had hemorrhoids and had some blood on the tissue when she wipes from a hemorrhoid. She is taking iron supplement once daily for the last 2 weeks. She is drinking herbal tea for constipation.       She is taking allopurinol 100 mg daily for gout.        She is now taking duloxetine 60 mg twice daily. Mood is still not good.      She takes tizanidine 4 mg at bedtime to help with muscle spasm of her back. She continues to have back pain. She is taking Oxycodone apap 10/325 3-5 times daily which helps her back. She had an epidural with pain management on 9/17/19.  She is having some restless legs at night.      She is taking Lantus 60 units in the evening and Novolog 23 units once or twice daily with meals.  No hypoglycemia.  Averages 130-140. No hypoglycemia     PAST MEDICAL HISTORY:   1. Diabetes mellitus   2. Hypertension   3. Hyperlipidemia   4. Left heart catheterization January 2007 which revealed luminal irregularities in the LAD, left circumflex and right coronary artery.  "She had diastolic dysfunction and patent renal arteries.   5. Sleep apnea   6. Obesity.   7. Nephrolithiasis status post lithotripsy.   8. Reflux - had nonerosive gastropathy on EGD September 2007. Gastritis on EGD 9/2010  9. Hidradenitis suppurativa.   10. History of diverticulitis with a hospitalized October 2007.   11. Migraines   12. Obesity.   13. 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    14. Total hysterectomy in 1969.   15. Cholecystectomy was done at that time as well.   16. 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   17. Colon polyps on colonscopy 11/2010 - due 2013   18. Hospitalization 12/10 for e coli sepsis due to left ureteral stone with left nephrostomy tube in [place], [place]   19. Left knee replacement 9/2012      MEDICATIONS and ALLERGIES: Updated on EPIC.     PHYSICAL EXAMINATION:   /80 (BP Location: Left arm, Patient Position: Sitting, BP Method: Large (Manual))   Pulse 79   Temp 98.1 °F (36.7 °C) (Oral)   Ht 5' 8" (1.727 m)   Wt 98 kg (216 lb 0.8 oz)   LMP  (LMP Unknown)   SpO2 96%   BMI 32.85 kg/m²       GENERAL: She is alert, oriented, no apparent distress. Affect within   normal limits.  Conjunctiva anicteric. . Oropharynx clear. TM clear fluid on right. NO TM on left  NECK: Supple.   Respiratory: Effort normal. Lungs clear  HEART: Regular rate and rhythm without murmurs, gallops or rubs.   No lower extremity edema.    ABDOMEN: soft, non distended, bowel sounds present, no hepatosplenomgaly. Left lower quadrant tenderness. No rebound or guarding  Green stool that has been heme positive.            ASSESSMENT AND PLAN:   1.  Vertigo - meclizine 12. 5 mg three times daily as needed.  Loratadine 10 mg daily for congestion  2. Gout - on allopurinol 100 mg daily.   2.  Mood disorder -  duloxetine to 60 mg twice daily  3.  Neck, shoulder and back pain -follow up with [facility]. Had epidural " 9/17/19  4. Asthma - stable   6. HTN -stable  7. Gerd -on pantoprazole 40 mg twice daily -   8. Diabetes with neuropathy- watch sugars  9. Hyperlipidemia - off lipitor  10. Allergic rhinitis - stable  11. Diastolic congestive heart failure - decrease fluid pill to once daily unless swelling. .    12. CRI -stable  13. Obesity - discussed diet, exercise and weight loss  14. CAD -risk factor modification  15. Aortic atherosclerosis and possible mesenteric artery stenosis- risk factor modification  16. Tortuous aorta - HTN controlled  17. Colon polyps - Colonoscopy in 8/2013 - 2 polyps. Flex sig 11/2013 - mild granular mucosa.  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 .Restless leg syndrome - roprinole  Attempted colonoscopy 11/2016 du to diarrhea which has resolved. She had poor prep with hyperplastic rectal biopsy.  Colonoscopy 6/2017 diverticulosis - no polyps due 2023.   MMG 5/19  Prevnar 10/15 and flu shot 11/16  I will see her back in 4 weeks sooner if issues. .

## 2019-11-07 RX ORDER — OXYCODONE AND ACETAMINOPHEN 10; 325 MG/1; MG/1
TABLET ORAL
Qty: 150 TABLET | Refills: 0 | Status: SHIPPED | OUTPATIENT
Start: 2019-11-07 | End: 2019-12-09 | Stop reason: SDUPTHER

## 2019-11-07 NOTE — TELEPHONE ENCOUNTER
----- Message from Maria R Sandra sent at 11/7/2019  9:01 AM CST -----  Contact: Patient [phone]  Patient is calling for an RX refill or new RX.  Is this a refill or new RX:  refill  RX name and strength: oxyCODONE-acetaminophen (PERCOCET)  mg per tablet  Directions (copy/paste from chart):    Is this a 30 day or 90 day RX:    Local pharmacy or mail order pharmacy:  local  Pharmacy name and phone # (copy/paste from chart):   [facility] [phone] (Phone)  [phone] (Fax)    Comments:

## 2019-11-18 ENCOUNTER — OFFICE VISIT (OUTPATIENT)
Dept: INTERNAL MEDICINE | Facility: CLINIC | Age: 79
End: 2019-11-18
Payer: MEDICARE

## 2019-11-18 ENCOUNTER — LAB VISIT (OUTPATIENT)
Dept: LAB | Facility: HOSPITAL | Age: 79
End: 2019-11-18
Attending: INTERNAL MEDICINE
Payer: MEDICARE

## 2019-11-18 VITALS
DIASTOLIC BLOOD PRESSURE: 60 MMHG | OXYGEN SATURATION: 98 % | HEART RATE: 69 BPM | HEIGHT: 68 IN | SYSTOLIC BLOOD PRESSURE: 120 MMHG | WEIGHT: 221.31 LBS | BODY MASS INDEX: 33.54 KG/M2 | TEMPERATURE: 97 F

## 2019-11-18 DIAGNOSIS — K21.9 GASTROESOPHAGEAL REFLUX DISEASE WITHOUT ESOPHAGITIS: ICD-10-CM

## 2019-11-18 DIAGNOSIS — E13.9 DIABETES MELLITUS DUE TO ABNORMAL INSULIN: Primary | ICD-10-CM

## 2019-11-18 DIAGNOSIS — I10 ESSENTIAL HYPERTENSION: Chronic | ICD-10-CM

## 2019-11-18 DIAGNOSIS — E78.5 HYPERLIPIDEMIA, UNSPECIFIED HYPERLIPIDEMIA TYPE: ICD-10-CM

## 2019-11-18 DIAGNOSIS — M10.9 GOUT, ARTHRITIS: ICD-10-CM

## 2019-11-18 DIAGNOSIS — R06.02 SHORTNESS OF BREATH: ICD-10-CM

## 2019-11-18 DIAGNOSIS — E13.9 DIABETES MELLITUS DUE TO ABNORMAL INSULIN: ICD-10-CM

## 2019-11-18 LAB
ALBUMIN SERPL BCP-MCNC: 3.3 G/DL (ref 3.5–5.2)
ALP SERPL-CCNC: 75 U/L (ref 55–135)
ALT SERPL W/O P-5'-P-CCNC: 10 U/L (ref 10–44)
ANION GAP SERPL CALC-SCNC: 8 MMOL/L (ref 8–16)
AST SERPL-CCNC: 17 U/L (ref 10–40)
BASOPHILS # BLD AUTO: 0.05 K/UL (ref 0–0.2)
BASOPHILS NFR BLD: 0.6 % (ref 0–1.9)
BILIRUB SERPL-MCNC: 0.4 MG/DL (ref 0.1–1)
BUN SERPL-MCNC: 14 MG/DL (ref 8–23)
CALCIUM SERPL-MCNC: 9.2 MG/DL (ref 8.7–10.5)
CHLORIDE SERPL-SCNC: 102 MMOL/L (ref 95–110)
CHOLEST SERPL-MCNC: 269 MG/DL (ref 120–199)
CHOLEST/HDLC SERPL: 5.2 {RATIO} (ref 2–5)
CO2 SERPL-SCNC: 33 MMOL/L (ref 23–29)
CREAT SERPL-MCNC: 1.4 MG/DL (ref 0.5–1.4)
DIFFERENTIAL METHOD: ABNORMAL
EOSINOPHIL # BLD AUTO: 0.3 K/UL (ref 0–0.5)
EOSINOPHIL NFR BLD: 3.3 % (ref 0–8)
ERYTHROCYTE [DISTWIDTH] IN BLOOD BY AUTOMATED COUNT: 13.7 % (ref 11.5–14.5)
EST. GFR  (AFRICAN AMERICAN): 41 ML/MIN/1.73 M^2
EST. GFR  (NON AFRICAN AMERICAN): 36 ML/MIN/1.73 M^2
ESTIMATED AVG GLUCOSE: 212 MG/DL (ref 68–131)
GLUCOSE SERPL-MCNC: 151 MG/DL (ref 70–110)
HBA1C MFR BLD HPLC: 9 % (ref 4–5.6)
HCT VFR BLD AUTO: 34.9 % (ref 37–48.5)
HDLC SERPL-MCNC: 52 MG/DL (ref 40–75)
HDLC SERPL: 19.3 % (ref 20–50)
HGB BLD-MCNC: 11.4 G/DL (ref 12–16)
LDLC SERPL CALC-MCNC: 193.4 MG/DL (ref 63–159)
LYMPHOCYTES # BLD AUTO: 2.3 K/UL (ref 1–4.8)
LYMPHOCYTES NFR BLD: 25.5 % (ref 18–48)
MCH RBC QN AUTO: 28.6 PG (ref 27–31)
MCHC RBC AUTO-ENTMCNC: 32.7 G/DL (ref 32–36)
MCV RBC AUTO: 88 FL (ref 82–98)
MONOCYTES # BLD AUTO: 0.9 K/UL (ref 0.3–1)
MONOCYTES NFR BLD: 10.1 % (ref 4–15)
NEUTROPHILS # BLD AUTO: 5.5 K/UL (ref 1.8–7.7)
NEUTROPHILS NFR BLD: 60.5 % (ref 38–73)
NONHDLC SERPL-MCNC: 217 MG/DL
PLATELET # BLD AUTO: 237 K/UL (ref 150–350)
PMV BLD AUTO: 10.2 FL (ref 9.2–12.9)
POTASSIUM SERPL-SCNC: 3.5 MMOL/L (ref 3.5–5.1)
PROT SERPL-MCNC: 7.2 G/DL (ref 6–8.4)
RBC # BLD AUTO: 3.99 M/UL (ref 4–5.4)
SODIUM SERPL-SCNC: 143 MMOL/L (ref 136–145)
TRIGL SERPL-MCNC: 118 MG/DL (ref 30–150)
TSH SERPL DL<=0.005 MIU/L-ACNC: 1.2 UIU/ML (ref 0.4–4)
URATE SERPL-MCNC: 8.2 MG/DL (ref 2.4–5.7)
WBC # BLD AUTO: 9.07 K/UL (ref 3.9–12.7)

## 2019-11-18 PROCEDURE — 80061 LIPID PANEL: CPT | Mod: HCNC

## 2019-11-18 PROCEDURE — 84443 ASSAY THYROID STIM HORMONE: CPT | Mod: HCNC

## 2019-11-18 PROCEDURE — 3074F PR MOST RECENT SYSTOLIC BLOOD PRESSURE < 130 MM HG: ICD-10-PCS | Mod: HCNC,CPTII,S$GLB, | Performed by: INTERNAL MEDICINE

## 2019-11-18 PROCEDURE — 3078F PR MOST RECENT DIASTOLIC BLOOD PRESSURE < 80 MM HG: ICD-10-PCS | Mod: HCNC,CPTII,S$GLB, | Performed by: INTERNAL MEDICINE

## 2019-11-18 PROCEDURE — 83036 HEMOGLOBIN GLYCOSYLATED A1C: CPT | Mod: HCNC

## 2019-11-18 PROCEDURE — 84550 ASSAY OF BLOOD/URIC ACID: CPT | Mod: HCNC

## 2019-11-18 PROCEDURE — 99214 PR OFFICE/OUTPT VISIT, EST, LEVL IV, 30-39 MIN: ICD-10-PCS | Mod: HCNC,S$GLB,, | Performed by: INTERNAL MEDICINE

## 2019-11-18 PROCEDURE — 99214 OFFICE O/P EST MOD 30 MIN: CPT | Mod: HCNC,S$GLB,, | Performed by: INTERNAL MEDICINE

## 2019-11-18 PROCEDURE — 3078F DIAST BP <80 MM HG: CPT | Mod: HCNC,CPTII,S$GLB, | Performed by: INTERNAL MEDICINE

## 2019-11-18 PROCEDURE — 85025 COMPLETE CBC W/AUTO DIFF WBC: CPT | Mod: HCNC

## 2019-11-18 PROCEDURE — 3074F SYST BP LT 130 MM HG: CPT | Mod: HCNC,CPTII,S$GLB, | Performed by: INTERNAL MEDICINE

## 2019-11-18 PROCEDURE — 36415 COLL VENOUS BLD VENIPUNCTURE: CPT | Mod: HCNC

## 2019-11-18 PROCEDURE — 80053 COMPREHEN METABOLIC PANEL: CPT | Mod: HCNC

## 2019-11-18 PROCEDURE — 99999 PR PBB SHADOW E&M-EST. PATIENT-LVL III: ICD-10-PCS | Mod: PBBFAC,HCNC,, | Performed by: INTERNAL MEDICINE

## 2019-11-18 PROCEDURE — 99999 PR PBB SHADOW E&M-EST. PATIENT-LVL III: CPT | Mod: PBBFAC,HCNC,, | Performed by: INTERNAL MEDICINE

## 2019-11-18 PROCEDURE — 1101F PT FALLS ASSESS-DOCD LE1/YR: CPT | Mod: HCNC,CPTII,S$GLB, | Performed by: INTERNAL MEDICINE

## 2019-11-18 PROCEDURE — 1101F PR PT FALLS ASSESS DOC 0-1 FALLS W/OUT INJ PAST YR: ICD-10-PCS | Mod: HCNC,CPTII,S$GLB, | Performed by: INTERNAL MEDICINE

## 2019-11-18 RX ORDER — MECLIZINE HCL 12.5 MG 12.5 MG/1
12.5 TABLET ORAL 3 TIMES DAILY PRN
Qty: 30 TABLET | Refills: 0 | Status: SHIPPED | OUTPATIENT
Start: 2019-11-18 | End: 2022-04-28

## 2019-11-18 RX ORDER — ALLOPURINOL 100 MG/1
100 TABLET ORAL DAILY
Qty: 90 TABLET | Refills: 3 | Status: SHIPPED | OUTPATIENT
Start: 2019-11-18 | End: 2020-01-08 | Stop reason: SDUPTHER

## 2019-11-18 RX ORDER — CHOLECALCIFEROL (VITAMIN D3) 125 MCG
5000 CAPSULE ORAL DAILY
COMMUNITY
End: 2023-12-06

## 2019-11-18 RX ORDER — POTASSIUM CHLORIDE 750 MG/1
10 TABLET, EXTENDED RELEASE ORAL 2 TIMES DAILY
Qty: 60 TABLET | Refills: 3 | Status: SHIPPED | OUTPATIENT
Start: 2019-11-18 | End: 2020-01-08 | Stop reason: SDUPTHER

## 2019-11-18 RX ORDER — ALBUTEROL SULFATE 90 UG/1
2 AEROSOL, METERED RESPIRATORY (INHALATION) EVERY 4 HOURS PRN
Qty: 18 G | Refills: 1 | Status: SHIPPED | OUTPATIENT
Start: 2019-11-18 | End: 2021-05-12 | Stop reason: SDUPTHER

## 2019-11-18 RX ORDER — PROMETHAZINE HYDROCHLORIDE AND DEXTROMETHORPHAN HYDROBROMIDE 6.25; 15 MG/5ML; MG/5ML
SYRUP ORAL
Qty: 240 ML | Refills: 1 | Status: SHIPPED | OUTPATIENT
Start: 2019-11-18 | End: 2020-01-08 | Stop reason: SDUPTHER

## 2019-11-18 NOTE — PROGRESS NOTES
CHIEF COMPLAINT: Follow up of multiple issues     HISTORY OF PRESENT ILLNESS: This is a 79-year-old woman who presents for follow up of above     Dizziness is better. She  takes a meclizine 12.5 mg one at bedtime.  She has sinus congestion that comes and goes.  She is taking loratadine 10 mg daily. SHe has occasional dry cough.    Left knee bothers her at times.  Right knee is fine now.      Occasional nausea. No abdominal pain, constipation, diarrhea, chest pain, palpitations, shortness of breath.        She has been taking furosemide 40 mg once daily, coreg 25 mg twice daily for her hypertension.      She has been taking pantoprazole 40 mg twice daily. Food is not getting stuck in her throat. Occasional nausea.  She continues to have intermittent abdominal cramping. No vomiting, constipation, diarrhea.  Occasional soft or watery stools.  Stool has been dark the last week. She has also had hemorrhoids and had some blood on the tissue when she wipes from a hemorrhoid. She is taking iron supplement once daily for the last 2 weeks. She is drinking herbal tea for constipation.       She is not taking allopurinol 100 mg daily for gout.        She is now taking duloxetine 60 mg twice daily. Mood is still not good.      She takes tizanidine 4 mg at bedtime as needed  to help with muscle spasm of her back. She continues to have back pain. She is taking Oxycodone apap 10/325 3-5 times daily which helps her back. She had an epidural with pain management on 9/17/19.  She is having some restless legs at night.      She is taking Lantus 60 units in the evening and Novolog 23 units once or twice daily with meals.  No hypoglycemia.  Averages 130-140. No hypoglycemia     PAST MEDICAL HISTORY:   1. Diabetes mellitus   2. Hypertension   3. Hyperlipidemia   4. Left heart catheterization January 2007 which revealed luminal irregularities in the LAD, left circumflex and right coronary artery. She had diastolic dysfunction and patent  "renal arteries.   5. Sleep apnea   6. Obesity.   7. Nephrolithiasis status post lithotripsy.   8. Reflux - had nonerosive gastropathy on EGD September 2007. Gastritis on EGD 9/2010  9. Hidradenitis suppurativa.   10. History of diverticulitis with a hospitalized October 2007.   11. Migraines   12. Obesity.   13. 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    14. Total hysterectomy in 1969.   15. Cholecystectomy was done at that time as well.   16. 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   17. Colon polyps on colonscopy 11/2010 - due 2013   18. Hospitalization 12/10 for e coli sepsis due to left ureteral stone with left nephrostomy tube in [place], [place]   19. Left knee replacement 9/2012      MEDICATIONS and ALLERGIES: Updated on EPIC.     PHYSICAL EXAMINATION:   /60 (BP Location: Right arm, Patient Position: Sitting, BP Method: Large (Manual))   Pulse 69   Temp 97.4 °F (36.3 °C) (Oral)   Ht 5' 8" (1.727 m)   Wt 100.4 kg (221 lb 5.5 oz)   LMP  (LMP Unknown)   SpO2 98%   BMI 33.65 kg/m²        GENERAL: She is alert, oriented, no apparent distress. Affect within   normal limits.  Conjunctiva anicteric. . Oropharynx clear. TM clear fluid on right. NO TM on left  NECK: Supple.   Respiratory: Effort normal. Lungs clear  HEART: Regular rate and rhythm without murmurs, gallops or rubs.   No lower extremity edema.    ABDOMEN: soft, non distended, bowel sounds present, no hepatosplenomgaly. Left lower quadrant tenderness. No rebound or guarding          ASSESSMENT AND PLAN:   1.  Vertigo - stable on meclizine 12. 5 mg nightly  2. Gout - get on allopurinol 100 mg daily.   2.  Mood disorder - stable  duloxetine to 60 mg twice daily  3.  Neck, shoulder and back pain -follow up with back and spine center. Had epidural 9/17/19  4. Asthma - stable   6. HTN -stable  7. Gerd -on pantoprazole 40 mg twice daily -   8. Diabetes with neuropathy- " watch sugars- labs  9. Hyperlipidemia - off lipitor  10. Allergic rhinitis - stable  11. Diastolic congestive heart failure - stable    12. CRI -stable  13. Obesity - discussed diet, exercise and weight loss  14. CAD -risk factor modification  15. Aortic atherosclerosis and possible mesenteric artery stenosis- risk factor modification  16. Tortuous aorta - HTN controlled  17. Colon polyps - Colonoscopy in 8/2013 - 2 polyps. Flex sig 11/2013 - mild granular mucosa.  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 .Restless leg syndrome - roprinole  Attempted colonoscopy 11/2016 du to diarrhea which has resolved. She had poor prep with hyperplastic rectal biopsy.  Colonoscopy 6/2017 diverticulosis - no polyps due 2023.   MMG 5/19  Prevnar 10/15 and flu shot 11/16  I will see her back in 8 weeks sooner if issues. .

## 2019-11-20 ENCOUNTER — OFFICE VISIT (OUTPATIENT)
Dept: OPTOMETRY | Facility: CLINIC | Age: 79
End: 2019-11-20
Payer: MEDICARE

## 2019-11-20 DIAGNOSIS — H04.123 DRY EYE SYNDROME OF BOTH EYES: Primary | ICD-10-CM

## 2019-11-20 PROCEDURE — 99999 PR PBB SHADOW E&M-EST. PATIENT-LVL II: CPT | Mod: PBBFAC,HCNC,, | Performed by: OPTOMETRIST

## 2019-11-20 PROCEDURE — 92012 PR EYE EXAM, EST PATIENT,INTERMED: ICD-10-PCS | Mod: HCNC,S$GLB,, | Performed by: OPTOMETRIST

## 2019-11-20 PROCEDURE — 99999 PR PBB SHADOW E&M-EST. PATIENT-LVL II: ICD-10-PCS | Mod: PBBFAC,HCNC,, | Performed by: OPTOMETRIST

## 2019-11-20 PROCEDURE — 92012 INTRM OPH EXAM EST PATIENT: CPT | Mod: HCNC,S$GLB,, | Performed by: OPTOMETRIST

## 2019-11-20 RX ORDER — ERYTHROMYCIN 5 MG/G
OINTMENT OPHTHALMIC NIGHTLY
Qty: 3.5 G | Refills: 0 | Status: SHIPPED | OUTPATIENT
Start: 2019-11-20 | End: 2019-12-04

## 2019-11-20 NOTE — PROGRESS NOTES
HPI     Eye Problem      Additional comments: Pt c/o contant tearing with FB sensation OS              Comments     Pt here today c/o constant tearing with FB sensation OS. Pt states that   the left eye has been bothering her since she had cataract sx. Pt denies   any pain or other symptoms. Pt also states she never got new glasses b/c   she lost Rx that Dr. Chiu gave her.  Pt uses drops of unknown name    1. ERM OD  2. Type 2 DM no DR  3. PCIOL OU  4. PCO OU  5. Astigmatism OU  6. Anisometropia          Last edited by Zenaida Pabon, OD on 11/20/2019 11:42 AM. (History)            Assessment /Plan     For exam results, see Encounter Report.    Dry eye syndrome of both eyes  -     erythromycin (ROMYCIN) ophthalmic ointment; Place into the left eye every evening. for 14 days  Dispense: 3.5 g; Refill: 0      Recommend Systane Ultra or Refresh Optive BID-TID OU to aid with symptoms of dry eyes.  Rec Refresh PM qHS OU.     Pt reports having trouble affording OTC drops. Rx emycin QHS OS.   Will call w/update in 1 week.    Pt failed to return for 10 wk OCT w/[facility] after 6/4/19 visit. Will have scheduled while here today.

## 2019-11-25 ENCOUNTER — TELEPHONE (OUTPATIENT)
Dept: INTERNAL MEDICINE | Facility: CLINIC | Age: 79
End: 2019-11-25

## 2019-11-25 NOTE — TELEPHONE ENCOUNTER
----- Message from Jeannine Arriaga MD sent at 11/24/2019  9:41 PM CST -----  Please notify pt that her sugars are a little higher on recent blood work - blood sugars are averaging 212.  Your blood count, kidney function, liver function, thyroid function are stable  SF

## 2019-12-03 ENCOUNTER — OFFICE VISIT (OUTPATIENT)
Dept: PAIN MEDICINE | Facility: CLINIC | Age: 79
End: 2019-12-03
Payer: MEDICARE

## 2019-12-03 VITALS
BODY MASS INDEX: 32.74 KG/M2 | DIASTOLIC BLOOD PRESSURE: 104 MMHG | HEIGHT: 68 IN | SYSTOLIC BLOOD PRESSURE: 176 MMHG | WEIGHT: 216 LBS | TEMPERATURE: 98 F | HEART RATE: 89 BPM

## 2019-12-03 DIAGNOSIS — M53.3 SACROILIAC JOINT PAIN: ICD-10-CM

## 2019-12-03 DIAGNOSIS — M54.16 LUMBAR RADICULOPATHY: Primary | ICD-10-CM

## 2019-12-03 DIAGNOSIS — M51.36 DDD (DEGENERATIVE DISC DISEASE), LUMBAR: ICD-10-CM

## 2019-12-03 PROCEDURE — 3288F FALL RISK ASSESSMENT DOCD: CPT | Mod: HCNC,CPTII,S$GLB, | Performed by: NURSE PRACTITIONER

## 2019-12-03 PROCEDURE — 99213 PR OFFICE/OUTPT VISIT, EST, LEVL III, 20-29 MIN: ICD-10-PCS | Mod: HCNC,S$GLB,, | Performed by: NURSE PRACTITIONER

## 2019-12-03 PROCEDURE — 99999 PR PBB SHADOW E&M-EST. PATIENT-LVL IV: ICD-10-PCS | Mod: PBBFAC,HCNC,, | Performed by: NURSE PRACTITIONER

## 2019-12-03 PROCEDURE — 1100F PR PT FALLS ASSESS DOC 2+ FALLS/FALL W/INJURY/YR: ICD-10-PCS | Mod: HCNC,CPTII,S$GLB, | Performed by: NURSE PRACTITIONER

## 2019-12-03 PROCEDURE — 1100F PTFALLS ASSESS-DOCD GE2>/YR: CPT | Mod: HCNC,CPTII,S$GLB, | Performed by: NURSE PRACTITIONER

## 2019-12-03 PROCEDURE — 3080F PR MOST RECENT DIASTOLIC BLOOD PRESSURE >= 90 MM HG: ICD-10-PCS | Mod: HCNC,CPTII,S$GLB, | Performed by: NURSE PRACTITIONER

## 2019-12-03 PROCEDURE — 3080F DIAST BP >= 90 MM HG: CPT | Mod: HCNC,CPTII,S$GLB, | Performed by: NURSE PRACTITIONER

## 2019-12-03 PROCEDURE — 3288F PR FALLS RISK ASSESSMENT DOCUMENTED: ICD-10-PCS | Mod: HCNC,CPTII,S$GLB, | Performed by: NURSE PRACTITIONER

## 2019-12-03 PROCEDURE — 3077F SYST BP >= 140 MM HG: CPT | Mod: HCNC,CPTII,S$GLB, | Performed by: NURSE PRACTITIONER

## 2019-12-03 PROCEDURE — 1125F AMNT PAIN NOTED PAIN PRSNT: CPT | Mod: HCNC,S$GLB,, | Performed by: NURSE PRACTITIONER

## 2019-12-03 PROCEDURE — 1159F MED LIST DOCD IN RCRD: CPT | Mod: HCNC,S$GLB,, | Performed by: NURSE PRACTITIONER

## 2019-12-03 PROCEDURE — 1125F PR PAIN SEVERITY QUANTIFIED, PAIN PRESENT: ICD-10-PCS | Mod: HCNC,S$GLB,, | Performed by: NURSE PRACTITIONER

## 2019-12-03 PROCEDURE — 99213 OFFICE O/P EST LOW 20 MIN: CPT | Mod: HCNC,S$GLB,, | Performed by: NURSE PRACTITIONER

## 2019-12-03 PROCEDURE — 99999 PR PBB SHADOW E&M-EST. PATIENT-LVL IV: CPT | Mod: PBBFAC,HCNC,, | Performed by: NURSE PRACTITIONER

## 2019-12-03 PROCEDURE — 1159F PR MEDICATION LIST DOCUMENTED IN MEDICAL RECORD: ICD-10-PCS | Mod: HCNC,S$GLB,, | Performed by: NURSE PRACTITIONER

## 2019-12-03 PROCEDURE — 3077F PR MOST RECENT SYSTOLIC BLOOD PRESSURE >= 140 MM HG: ICD-10-PCS | Mod: HCNC,CPTII,S$GLB, | Performed by: NURSE PRACTITIONER

## 2019-12-03 NOTE — PROGRESS NOTES
Chronic Pain - Established Visit    Referring Physician: No ref. provider found    Chief Complaint:   Chief Complaint   Patient presents with    Low-back Pain        SUBJECTIVE:    Interval History 12/3/2019:  The patient is here for follow up of back pain.  Her pain is radiating down the sides of both legs with numbness.  Her BP remains elevated today.  She is followed by her PCP for this.  She denies SOB or chest pain today.  She thinks that it is always high when she comes to [employer] due to walking far from the garage.  She did have benefit with previous bilateral L4-5 TF JANEE.  Her pain today is 4/10.  The patient denies any bowel or bladder incontinence or signs of saddle paresthesia.  The patient denies any major medical changes since last office visit.    Interval History 10/1/2019:  The patient is here for follow up today of back and leg pain.  She is s/p bilateral L4-5 TF JANEE with 50% relief.  She says that her pain is more tolerable.  She has more pain with walking, such as walking to our office today.  She has persistent burning to both feet.  She has a known history of diabetes.  Her pain is worse at night and she describes it as burning.  She has intermittent radiation down the sides of both legs.  Her back pain is greater than her leg pain.  She takes Percocet from her PCP with benefit.  She would like to repeat Healthy Back.  She feels as though this was helpful and would like to restart.  Her pain today is 7/10.  Her blood pressure is elevated today.  She says that she has not taken her medication today because she was rushing to get to her appointment.  She denies headache, SOB, or chest pain.     Initial Encounter:  Henrietta Villasenor presents to the clinic for the evaluation of low back pain. The pain started and symptoms have been worsening.The pain is located in the low back area and radiates to the bilateral leg swelling.  The pain is described as sharp, shooting, throbbing, tingling and constant  and is rated as 5/10. The pain is rated with a score of  5/10 on the BEST day and a score of 8/10 on the WORST day.  Symptoms interfere with daily activity and sleeping. The pain is exacerbated by Standing, Bending, Coughing/Sneezing, Walking, Morning, Lifting and Getting out of bed/chair.  The pain is mitigated by medications. She reports spending 0 hours per day reclining. The patient reports 2 hours of uninterrupted sleep per night.    Patient denies night fever/night sweats, urinary incontinence, bowel incontinence, significant weight loss, significant motor weakness and loss of sensations.    Physical Therapy/Home Exercise: yes      Pain Disability Index Review:  Last 3 PDI Scores 10/1/2019 2019   Pain Disability Index (PDI) 50 49       Pain Medications:    - Opioids: Percocet (Oxycodone/Acetaminophen)  - Adjuvant Medications: Cymbalta ( Duloxetine) and Tizanidine  - Anti-Coagulants: None  - Others: None     report:  Reviewed and consistent with medication use as prescribed.    Pain Procedures:   19 Bilateral L4-5 TF JANEE- 50% relief    Imagin18 MRI Lumbar  Narrative     EXAMINATION:  MRI LUMBAR SPINE WITHOUT CONTRAST    CLINICAL HISTORY:  low back and right leg pain; Other intervertebral disc degeneration, lumbar region    TECHNIQUE:  Multiplanar, multisequence MR images were acquired from the thoracolumbar junction to the sacrum without the administration of contrast.    COMPARISON:  Radiograph 2018    FINDINGS:  Alignment: Normal.    Vertebrae: Normal marrow signal. No fracture.    Discs: Normal height and signal.    Cord: Normal.  Conus terminates at superior border of L1    Degenerative findings:    T12-L1: Mild broad-based disc bulge without compressive sequelae.    L1-L2: Unremarkable.    L2-L3: Mild broad-based disc bulge with facet arthropathy.  No compressive sequelae.    L3-L4: Broad-based disc bulge and facet arthropathy.  There is mild bilateral neural foraminal  narrowing.    L4-L5: Trace anterolisthesis of L4 on L5 with broad-based disc bulge and facet arthropathy.  This results in moderate bilateral neural foraminal narrowing.  Moderate canal stenosis.  There is also significant narrowing of the left greater than right lateral recesses.    L5-S1: Broad-based disc bulge without significant compressive sequelae.    Paraspinal muscles & soft tissues: Paraspinal atrophy.  No concerning findings in the visualized abdomen.  Add      Impression       Moderate degenerative change at L4-5.  Milder degenerative changes elsewhere in the lumbar spine.     6/5/18 Xray Lumbar  Narrative     EXAMINATION:  XR LUMBAR SPINE 5 VIEW WITH FLEX AND EXT    CLINICAL HISTORY:  low back and right leg pain;  Other intervertebral disc degeneration, lumbar region    TECHNIQUE:  Five views of the lumbar spine plus flexion extension views were performed.    COMPARISON:  02/03/2015    FINDINGS:  The lumbar vertebra are intact.  No compression fracture is identified.  Degenerative changes are again seen with small osteophytes at multiple levels.  L4-5 disc space is mildly narrowed.  Degenerative changes are also noted at lower facet joints.  There is associated grade 1 spondylolisthesis at L4-5 which is stable between flexion and extension at about 6 mm.  Alignment is otherwise satisfactory.    Visualized abdomen shows aortic atherosclerosis and surgical clips in the right upper quadrant.      Impression       No acute abnormality.  Chronic degenerative spine changes.           Past Medical History:   Diagnosis Date    Abnormality of lung 11/08/2011    Stable bandlike opacities at the lung bases, most likely representing      Anxiety     Arthritis     CAD (coronary artery disease)     Calculus of ureter 2/22/2011    CHF (congestive heart failure)     Chronic back pain 7/29/2012    Colon polyp 9/2010; 11/2010    Colon polyps 7/29/2012    Coronary artery disease involving native coronary artery  of native heart with angina pectoris     Depression     Diabetes mellitus     Diabetes mellitus type II     Diverticulitis large intestine 7/27/2015    Diverticulitis of large intestine without perforation or abscess without bleeding     Diverticulosis 09/25/2010; 11/02/2010; 11/08/2011; 7/29/2012    Duodenal disorder 08/25/2011    Duodenal erosion noted on EGD.    Duodenal ulcer, unspecified as acute or chronic, without hemorrhage, perforation, or obstruction 8/24/2011    E. coli sepsis 12/2010    Due to left ureteral stone with left nephrostomy tube - hospitalized in [place]    Esophageal dysmotility 01/24/2012    Noted on upper GI-barium swallow.    Facial weakness 1969    Left facial weakness s/p left mastoidectomy in ~ 1969.    Fatty liver 11/08/2011    Reported on CT-abdomen and in 06/2012 Gastro clinic visit note.    Gastric polyp 09/29/2010    GERD (gastroesophageal reflux disease) 7/29/2012    Hepatomegaly 11/08/2011    Reported on CT-abdomen    Herpes zoster with other nervous system complications(053.19) 2/28/2011    Hiatal hernia 06/26/2006; 09/29/2010; 08/25/2011    Noted on barium swallow 2006; noted on  EGD 2011.   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    Trouble in sleeping      Type II or unspecified type diabetes mellitus with neurological manifestations, not stated as uncontrolled(250.60)     Type II or unspecified type diabetes mellitus with peripheral circulatory disorders, not stated as uncontrolled(250.70)     Type II or unspecified type diabetes mellitus with renal manifestations, not stated as uncontrolled(250.40)      Past Surgical History:   Procedure Laterality Date    BELPHAROPTOSIS REPAIR      s/p LAMBERTO levator repair - Dr. Dejesus    CARDIAC CATHETERIZATION      CARPAL TUNNEL RELEASE  3/13/2012    CATARACT EXTRACTION W/  INTRAOCULAR LENS IMPLANT Right 10/31/2018        CATARACT EXTRACTION W/  INTRAOCULAR LENS IMPLANT Left 03/22/2018        CHOLECYSTECTOMY      COLONOSCOPY N/A 11/16/2016    Procedure: COLONOSCOPY;  Surgeon: Chris Storey MD;  [facility]  Service: Endoscopy;  Laterality: N/A;    COLONOSCOPY N/A 6/14/2017    Procedure: COLONOSCOPY;  Surgeon: Chris Storey MD;  [facility]  Service: Endoscopy;  Laterality: N/A;  colonoscopy in 3 months with better bowel prep. - Split PEG prep ordered    EXTRACORPOREAL SHOCK WAVE LITHOTRIPSY      INTRAOCULAR PROSTHESES INSERTION Right 10/31/2018    Procedure: INSERTION-INTRAOCULAR LENS (IOL);  Surgeon: Keysha Valle MD;  Location: [facility] OR;  Service: Ophthalmology;  Laterality: Right;    JOINT REPLACEMENT      mastoid tumor removal  1969    Left mastoidectomy with residual left facial weakness.    NEPHROSTOMY      OOPHORECTOMY      PHACOEMULSIFICATION OF CATARACT Right 10/31/2018    Procedure: PHACOEMULSIFICATION-ASPIRATION-CATARACT;  Surgeon: Keysha Valle MD;  Location: [facility] OR;  Service: Ophthalmology;  Laterality: Right;    TOTAL ABDOMINAL HYSTERECTOMY  1969    TAHBSO    TOTAL KNEE ARTHROPLASTY  9/13/2012    Left    TRANSFORAMINAL EPIDURAL INJECTION OF STEROID Bilateral 9/17/2019    Procedure: INJECTION, STEROID, EPIDURAL,  TRANSFORAMINAL APPROACH;  Surgeon: Oscar Ravi MD;  Location: [facility];  Service: Pain Management;  Laterality: Bilateral;  B/L TFESI L4/L5     Social History     Socioeconomic History    Marital status: Single     Spouse name: Not on file    Number of children: 5    Years of education: Not on file    Highest education level: Not on file   Occupational History    Not on file   Social Needs    Financial resource strain: Not on file    Food insecurity:     Worry: Not on file     Inability: Not on file    Transportation needs:     Medical: Not on file     Non-medical: Not on file   Tobacco Use    Smoking status: Former Smoker     Packs/day: 1.00     Years: 15.00     Pack years: 15.00     Types: Cigarettes     Last attempt to quit: 2000     Years since quittin.3    Smokeless tobacco: Never Used   Substance and Sexual Activity    Alcohol use: No    Drug use: No    Sexual activity: Never   Lifestyle    Physical activity:     Days per week: Not on file     Minutes per session: Not on file    Stress: Not on file   Relationships    Social connections:     Talks on phone: Not on file     Gets together: Not on file     Attends Taoist service: Not on file     Active member of club or organization: Not on file     Attends meetings of clubs or organizations: Not on file     Relationship status: Not on file   Other Topics Concern    Are you pregnant or think you may be? Not Asked    Breast-feeding Not Asked   Social History Narrative    Single with 5 children. Lives alone. Retired cook.     Family History   Problem Relation Age of Onset    Sleep apnea Sister     Diabetes Sister     Cancer Mother         brain tumor    Hypertension Mother     Heart disease Father     Heart attack Father     Dementia Brother     Lupus Brother     Frontotemporal dementia Brother     No Known Problems Daughter     No Known Problems Son     Diabetes Sister     Lupus Sister     Breast cancer Sister      Diabetes Sister     Cancer Sister         breast cancer    Heart attack Sister     Diabetes Sister     Liver cancer Brother     Drug abuse Brother     Alcohol abuse Brother     Cirrhosis Brother     Drug abuse Brother     No Known Problems Daughter     No Known Problems Son     No Known Problems Son     No Known Problems Maternal Grandmother     No Known Problems Maternal Grandfather     No Known Problems Paternal Grandmother     No Known Problems Paternal Grandfather     Diabetes Brother     Dementia Brother     Diabetes Other     Ovarian cancer Other         Niece     Breast cancer Maternal Aunt     No Known Problems Maternal Uncle     No Known Problems Paternal Aunt     No Known Problems Paternal Uncle     Glaucoma Neg Hx     Blindness Neg Hx     Celiac disease Neg Hx     Colon cancer Neg Hx     Colon polyps Neg Hx     Esophageal cancer Neg Hx     Inflammatory bowel disease Neg Hx     Liver disease Neg Hx     Rectal cancer Neg Hx     Stomach cancer Neg Hx     Ulcerative colitis Neg Hx     Melanoma Neg Hx     Multiple sclerosis Neg Hx     Psoriasis Neg Hx     Amblyopia Neg Hx     Cataracts Neg Hx     Macular degeneration Neg Hx     Retinal detachment Neg Hx     Strabismus Neg Hx     Stroke Neg Hx     Thyroid disease Neg Hx        Review of patient's allergies indicates:   Allergen Reactions    Crestor [rosuvastatin]      Cramping in legs    Ezetimibe Diarrhea     Other reaction(s): abdominal pain, Diarrhea      Hydrocodone Itching    Lisinopril      Other reaction(s): cough      Sulfa (sulfonamide antibiotics) Itching and Rash           Sulfamethoxazole     Sulfamethoxazole-trimethoprim     Valsartan Swelling       Current Outpatient Medications   Medication Sig    ACCU-CHEK FASTCLIX Misc     ACCU-CHEK SMARTVIEW TEST STRIP Strp TEST THREE TIMES DAILY    albuterol (VENTOLIN HFA) 90 mcg/actuation inhaler Inhale 2 puffs by mouth into the lungs every 4 (four)  "hours as needed for Wheezing. Rescue    ALCOHOL ANTISEPTIC PADS (ALCOHOL PREP PADS TOP)     allopurinol (ZYLOPRIM) 100 MG tablet Take 1 tablet (100 mg total) by mouth once daily.    aspirin (ECOTRIN) 81 MG EC tablet Take 1 tablet by mouth Daily.    azelastine (ASTELIN) 137 mcg (0.1 %) nasal spray Spray 1 spray (137 mcg total) by Nasal route 2 (two) times daily.    BD ALCOHOL SWABS PadM USE  WHEN  TESTING BLOOD SUGAR FOUR TIMES DAILY    BD ULTRA-FINE OLU PEN NEEDLES 32 x 5/32 " Ndle Uses 4 times daily, on multiple daily insulin injections    carbamide peroxide (EAR WAX REMOVAL DROPS OTIC)     carvedilol (COREG) 25 MG tablet TAKE 1 TABLET TWICE DAILY    cholecalciferol, vitamin D3, (DIALYVITE VITAMIN D) 5,000 unit capsule Take 5,000 Units by mouth once daily.    cholecalciferol, vitamin D3, (VITAMIN D3) 400 unit Cap Take 400 Units by mouth once daily.    DULoxetine (CYMBALTA) 60 MG capsule Take 1 capsule (60 mg total) by mouth 2 (two) times daily.    erythromycin (ROMYCIN) ophthalmic ointment Place into the left eye every evening. for 14 days    fluticasone (FLONASE) 50 mcg/actuation nasal spray SHAKE LQ AND U 2 SPRAYS IEN QD    furosemide (LASIX) 40 MG tablet TAKE 1 TABLET BY MOUTH TWICE DAILY    insulin (LANTUS SOLOSTAR U-100 INSULIN) glargine 100 units/mL (3mL) SubQ pen Inject 55-60 units at night.    insulin aspart (NOVOLOG) 100 unit/mL InPn pen 23 units with meals plus correction scale. Max daily dose=120 units (Patient taking differently: Inject 23 Units into the skin 3 (three) times daily with meals. 23 units with meals plus correction scale. Max daily dose=120 units)    Lactobacillus acidophilus (PROBIOTIC) 10 billion cell Cap Take 1 capsule by mouth once daily.    loratadine (CLARITIN) 10 mg tablet Take 1 tablet (10 mg total) by mouth once daily.    meclizine (ANTIVERT) 12.5 mg tablet Take 1 tablet (12.5 mg total) by mouth 3 (three) times daily as needed for Dizziness.    MUSCLE RUB, WITH " "CAMPHOR, 4-30-10 % Crea     nitroGLYCERIN (NITROSTAT) 0.4 MG SL tablet Place 1 tablet (0.4 mg total) under the tongue every 5 (five) minutes as needed.    olopatadine (PATADAY) 0.2 % Drop Place 1 drop into both eyes once daily.    ondansetron (ZOFRAN) 4 MG tablet Take 1 tablet (4 mg total) by mouth every 8 (eight) hours as needed for Nausea.    oxyCODONE-acetaminophen (PERCOCET)  mg per tablet Take one tablet by mouth 5 times daily    pantoprazole (PROTONIX) 40 MG tablet TAKE 1 TABLET(40 MG) BY MOUTH twice daily    pen needle, diabetic (SURE-FINE PEN NEEDLES) 29 gauge x 1/2" Ndle Use 4 times daily    potassium chloride SA (K-DUR,KLOR-CON) 10 MEQ tablet Take 1 tablet (10 mEq total) by mouth 2 (two) times daily.    promethazine-dextromethorphan (PROMETHAZINE-DM) 6.25-15 mg/5 mL Syrp Take 2 teaspoonsful by mouth every 8 hours as needed for cough    rOPINIRole (REQUIP) 0.5 MG tablet Take 1 tablet (0.5 mg total) by mouth every evening.    tiZANidine (ZANAFLEX) 4 MG tablet Take 0.5-1 tablets (2-4 mg total) by mouth every 8 (eight) hours.    TRUEPLUS LANCETS 33 gauge Misc TEST BLOOD SUGAR FOUR TIMES DAILY    triamcinolone acetonide 0.1% (KENALOG) 0.1 % cream Apply topically 3 (three) times daily.     No current facility-administered medications for this visit.      Facility-Administered Medications Ordered in Other Visits   Medication    tetracaine HCl (PF) 0.5 % Drop 1 drop       REVIEW OF SYSTEMS:    GENERAL:  No weight loss, malaise or fevers.  HEENT:  Negative for frequent or significant headaches.  NECK:  Negative for lumps, goiter, pain and significant neck swelling.  RESPIRATORY:  Negative for cough, wheezing or shortness of breath. MARLON.  CARDIOVASCULAR:  Negative for chest pain, leg swelling or palpitations.  CAD and CHF.  GI:  Negative for abdominal discomfort, blood in stools or black stools or change in bowel habits. GERD.  MUSCULOSKELETAL:  Lower back and joint pain  SKIN:  Negative for " "lesions, rash, and itching.  PSYCH:  Negative for sleep disturbance, mood disorder and recent psychosocial stressors.  HEMATOLOGY/LYMPHOLOGY:  Negative for prolonged bleeding, bruising easily or swollen nodes. Anemia.  NEURO:   No history of headaches, syncope, paralysis, seizures or tremors.  All other reviewed and negative other than HPI.    OBJECTIVE:    BP (!) 202/109 (BP Location: Right arm)   Pulse 89   Temp 97.6 °F (36.4 °C)   Ht 5' 8" (1.727 m)   Wt 98 kg (216 lb)   LMP  (LMP Unknown)   BMI 32.84 kg/m²     PHYSICAL EXAMINATION:    General appearance: Well appearing, in no acute distress, alert and oriented x3.  Psych:  Mood and affect appropriate.  Skin: Skin color, texture, turgor normal, no rashes or lesions, in both upper and lower body.  Head/face:  Normocephalic, atraumatic. No palpable lymph nodes.  Cor: RRR  Pulm: CTA  GI:  Soft and non-tender.  Back: Straight leg raising in the sitting and supine positions is negative to radicular pain.  Positive tenderness to palpation over bilateral lumbar facet joints.  Limited ROM on flexion and extension with pain.  Positive facet loading bilaterally, R>L.  Extremities: Peripheral joint ROM is full and pain free without obvious instability or laxity in all four extremities. No deformities, edema, or skin discoloration. Good capillary refill.  Musculoskeletal: Bilateral upper and lower extremity strength is normal and symmetric.  No atrophy or tone abnormalities are noted.  Neuro: Bilateral upper and lower extremity coordination and muscle stretch reflexes are physiologic and symmetric.  Plantar response are downgoing.  Decreased sensation to BLE.  Gait: Antalgic.    ASSESSMENT: 79 y.o. year old female with chronic lower back pain and leg pain consistent with the following diagnoses:    1. Lumbar radiculopathy  [facility]   2. DDD (degenerative disc disease), lumbar  Ambulatory consult to [facility]   3. Sacroiliac " joint pain  [facility]         PLAN:     - I have stressed the importance of physical activity and a home exercise plan to help with pain and improve health.  - Previous imaging was reviewed and discussed with the patient today.  - Schedule for repeat bilateral Transforaminal epidural steroid injection at L4/L5 with some benefit.  Consider bilateral L3,4,5 MBB/RFA.  Her leg pain is currently greater than her back pain.  - I placed another order for Healthy Back.  - RTC 2 weeks after JANEE.  - Counseled patient regarding the importance of physical therapy.      The above plan and management options were discussed at length with patient. Patient is in agreement with the above and verbalized understanding.     Maria R Haskins  12/03/2019

## 2019-12-09 RX ORDER — OXYCODONE AND ACETAMINOPHEN 10; 325 MG/1; MG/1
TABLET ORAL
Qty: 150 TABLET | Refills: 0 | Status: SHIPPED | OUTPATIENT
Start: 2019-12-09 | End: 2020-01-08 | Stop reason: SDUPTHER

## 2019-12-09 NOTE — TELEPHONE ENCOUNTER
----- Message from Javier Keller sent at 12/9/2019  9:01 AM CST -----  Contact: [phone]  Type: Rx    Name of medication(s):  oxyCODONE-acetaminophen (PERCOCET)  mg per tablet    Is this a refill? New rx? Refill      Who prescribed medication?    Pharmacy Name, Phone, & Location: [address] [facility]     Comments: please call and advise , Thanks

## 2019-12-10 ENCOUNTER — CLINICAL SUPPORT (OUTPATIENT)
Dept: REHABILITATION | Facility: OTHER | Age: 79
End: 2019-12-10
Attending: NURSE PRACTITIONER
Payer: MEDICARE

## 2019-12-10 DIAGNOSIS — R26.9 ABNORMALITY OF GAIT AND MOBILITY: ICD-10-CM

## 2019-12-10 DIAGNOSIS — M53.86 DECREASED ROM OF LUMBAR SPINE: ICD-10-CM

## 2019-12-10 DIAGNOSIS — R53.1 WEAKNESS: ICD-10-CM

## 2019-12-10 DIAGNOSIS — R68.89 DECREASED FUNCTIONAL ACTIVITY TOLERANCE: ICD-10-CM

## 2019-12-10 PROCEDURE — 97162 PT EVAL MOD COMPLEX 30 MIN: CPT | Mod: HCNC

## 2019-12-10 PROCEDURE — 97110 THERAPEUTIC EXERCISES: CPT | Mod: HCNC

## 2019-12-11 NOTE — PLAN OF CARE
"  [facility] - PHYSICAL THERAPY EVALUATION     Name: Henrietta Villasenor  Clinic Number: [id number]    Therapy Diagnosis:   Encounter Diagnoses   Name Primary?    Decreased ROM of lumbar spine     Weakness     Abnormality of gait and mobility     Decreased functional activity tolerance        Physician: Maria R Haskins,*    Physician Orders: PT Eval and Treat     Medical Diagnosis from Referral:   M54.16 (ICD-10-CM) - Lumbar radiculopathy  M51.36 (ICD-10-CM) - DDD (degenerative disc disease), lumbar  M53.3 (ICD-10-CM) - Sacroiliac joint pain      Evaluation Date: 12/10/2019  Authorization Period Expiration: 12/31/19  Plan of Care Expiration: 3/10/2020  Reassessment Due: 1/10/2020  Visit # / Visits authorized: 1/ 20    Time In: 1315  Time Out: 1430  Total Billable Time: 75 minutes    Precautions: Multiple co-morbidiities: cardiac, Endocrine, Neuro  Balance ,Fall, HTN, CAD, CHF, DM II     Pattern of pain determined: 1 PEN      Subjective:     History of current condition - Henrietta reports "same old same old, the back and the legs are still bothering me". The LBP has been going on for years, "it gets better and gets back to the same". She started this program before but did not complete it. The pain is back dominant across. Pain is worse with prolonged walking (from parking lot, she has to take rest due to SOB and pain), mopping/cleaning or picking up something; also worse with bending forward, and standing transition. Pain is better with rest laying down, pain pills. She has a lot of problems with her stomach. Patient lives alone in a one level home with 4 steps and B HR's. Patient had 3 falls in the past year she reports (1x recently last week-dizzy), as she sometimes feels dizzy. Patient reports decrease temperature sensation in shower at feet B, but able to feel ground under her feet B.     MD's Notes:  Interval History 12/3/2019  The patient is here for follow up of back pain.  Her pain is radiating " down the sides of both legs with numbness.  Her BP remains elevated today.  She is followed by her PCP for this.  She denies SOB or chest pain today.  She thinks that it is always high when she comes to [employer] due to walking far from the garage.  She did have benefit with previous bilateral L4-5 TF JANEE.  Her pain today is 4/10.  The patient denies any bowel or bladder incontinence or signs of saddle paresthesia.  The patient denies any major medical changes since last office visit.      Medical History:   Past Medical History:   Diagnosis Date    Abnormality of lung 11/08/2011    Stable bandlike opacities at the lung bases, most likely representing      Anxiety     Arthritis     CAD (coronary artery disease)     Calculus of ureter 2/22/2011    CHF (congestive heart failure)     Chronic back pain 7/29/2012    Colon polyp 9/2010; 11/2010    Colon polyps 7/29/2012    Coronary artery disease involving native coronary artery of native heart with angina pectoris     Depression     Diabetes mellitus     Diabetes mellitus type II     Diverticulitis large intestine 7/27/2015    Diverticulitis of large intestine without perforation or abscess without bleeding     Diverticulosis 09/25/2010; 11/02/2010; 11/08/2011; 7/29/2012    Duodenal disorder 08/25/2011    Duodenal erosion noted on EGD.    Duodenal ulcer, unspecified as acute or chronic, without hemorrhage, perforation, or obstruction 8/24/2011    E. coli sepsis 12/2010    Due to left ureteral stone with left nephrostomy tube - hospitalized in [place]    Esophageal dysmotility 01/24/2012    Noted on upper GI-barium swallow.    Facial weakness 1969    Left facial weakness s/p left mastoidectomy in ~ 1969.    Fatty liver 11/08/2011    Reported on CT-abdomen and in 06/2012 Gastro clinic visit note.    Gastric polyp 09/29/2010    GERD (gastroesophageal reflux disease) 7/29/2012    Hepatomegaly 11/08/2011    Reported on CT-abdomen    Herpes zoster with  other nervous system complications(053.19) 2/28/2011    Hiatal hernia 06/26/2006; 09/29/2010; 08/25/2011    Noted on barium swallow 2006; noted on  EGD 2011.   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    Trouble in sleeping     Type II or unspecified type diabetes mellitus with neurological manifestations, not stated as uncontrolled(250.60)     Type II or unspecified type diabetes mellitus with peripheral circulatory disorders, not stated as uncontrolled(250.70)     Type II or unspecified type diabetes mellitus with renal manifestations, not stated as uncontrolled(250.40)        Surgical History:   Henrietta Villasenor  has a past surgical history that includes Cardiac catheterization; Extracorporeal shock wave lithotripsy; mastoid tumor removal (1969); Nephrostomy; Carpal tunnel release (3/13/2012); Blepharoptosis repair; Total knee arthroplasty (9/13/2012); Cholecystectomy; Colonoscopy (N/A, 11/16/2016); Colonoscopy (N/A, 6/14/2017); Oophorectomy; Joint replacement; Phacoemulsification of cataract (Right, 10/31/2018); Intraocular prosthesis insertion (Right, 10/31/2018); Total abdominal hysterectomy (1969); Transforaminal epidural injection of steroid (Bilateral, 9/17/2019); Cataract extraction w/  intraocular  lens implant (Right, 10/31/2018); and Cataract extraction w/  intraocular lens implant (Left, 03/22/2018).    Medications:   Henrietta has a current medication list which includes the following prescription(s): accu-chek fastclix lancing dev, accu-chek smartview test strip, albuterol, alcohol antiseptic pads, allopurinol, aspirin, azelastine, bd alcohol swabs, bd ultra-fine angelica pen needle, carbamide peroxide, carvedilol, cholecalciferol (vitamin d3), cholecalciferol (vitamin d3), duloxetine, fluticasone propionate, furosemide, insulin, insulin aspart u-100, lactobacillus acidophilus, loratadine, meclizine, muscle rub (with camphor), nitroglycerin, olopatadine, ondansetron, oxycodone-acetaminophen, pantoprazole, pen needle, diabetic, potassium chloride sa, promethazine-dextromethorphan, ropinirole, tizanidine, triamcinolone acetonide 0.1%, and trueplus lancets, and the following Facility-Administered Medications: tetracaine hcl (pf).    Allergies:   Review of patient's allergies indicates:   Allergen Reactions    Crestor [rosuvastatin]      Cramping in legs    Ezetimibe Diarrhea     Other reaction(s): abdominal pain, Diarrhea      Hydrocodone Itching    Lisinopril      Other reaction(s): cough      Sulfa (sulfonamide antibiotics) Itching and Rash           Sulfamethoxazole     Sulfamethoxazole-trimethoprim     Valsartan Swelling        IMAGING  6/12/18 MRI Lumbar  Narrative      EXAMINATION:  MRI LUMBAR SPINE WITHOUT CONTRAST    CLINICAL HISTORY:  low back and right leg pain; Other intervertebral disc degeneration, lumbar region    TECHNIQUE:  Multiplanar, multisequence MR images were acquired from the thoracolumbar junction to the sacrum without the administration of contrast.    COMPARISON:  Radiograph 06/05/2018    FINDINGS:  Alignment: Normal.    Vertebrae: Normal marrow signal. No fracture.    Discs: Normal height and signal.    Cord: Normal.  Conus terminates at superior border of L1    Degenerative  findings:    T12-L1: Mild broad-based disc bulge without compressive sequelae.    L1-L2: Unremarkable.    L2-L3: Mild broad-based disc bulge with facet arthropathy.  No compressive sequelae.    L3-L4: Broad-based disc bulge and facet arthropathy.  There is mild bilateral neural foraminal narrowing.    L4-L5: Trace anterolisthesis of L4 on L5 with broad-based disc bulge and facet arthropathy.  This results in moderate bilateral neural foraminal narrowing.  Moderate canal stenosis.  There is also significant narrowing of the left greater than right lateral recesses.    L5-S1: Broad-based disc bulge without significant compressive sequelae.    Paraspinal muscles & soft tissues: Paraspinal atrophy.  No concerning findings in the visualized abdomen.  Add     Impression        Moderate degenerative change at L4-5.  Milder degenerative changes elsewhere in the lumbar spine.     6/5/18 Xray Lumbar  Narrative      EXAMINATION:  XR LUMBAR SPINE 5 VIEW WITH FLEX AND EXT    CLINICAL HISTORY:  low back and right leg pain;  Other intervertebral disc degeneration, lumbar region    TECHNIQUE:  Five views of the lumbar spine plus flexion extension views were performed.    COMPARISON:  02/03/2015    FINDINGS:  The lumbar vertebra are intact.  No compression fracture is identified.  Degenerative changes are again seen with small osteophytes at multiple levels.  L4-5 disc space is mildly narrowed.  Degenerative changes are also noted at lower facet joints.  There is associated grade 1 spondylolisthesis at L4-5 which is stable between flexion and extension at about 6 mm.  Alignment is otherwise satisfactory.    Visualized abdomen shows aortic atherosclerosis and surgical clips in the right upper quadrant.     Impression        No acute abnormality.  Chronic degenerative spine changes.         Prior Therapy: Yes HB program, not completed.   Prior Treatment:  9/17/19 Bilateral L4-5 TF JANEE- 50% relief  Social History: See  "subjective  Occupation: Retired  Leisure: [employer]aeism       Prior Level of Function: Manageable difficulty with daily activities  Current Level of Function: Moderate to severe difficulty with all daily activities  DME owned/used: [employer], uses it when needed      Pain:  Current 8/10 (due to walking from parking lot), worst 10/10, best 3/10   Location: Across lower back and central   Description: Aching, Throbbing, Tight and Sharp  Aggravating Factors: See subjective; walking, getting out of the bed or chair  Easing Factors: See subjective  Disturbed Sleep: No      Pattern of pain questions:  1.  Where is your pain the worst? Lower back across and central  2.  Is your pain constant or intermittent? Constant  3.  Does bending forward make your typical pain worse? Yes  4.  Since the start of your back pain, has there been a change in your bowel or bladder? Sometimes, no control  5.  What can't you do now that you use to be able to do? Walking, and almost about everything, house work, and cleaning      Pts goals: Clean house standing long, outing to visit different places    Red Flag Screening:   Cough  Sneeze  Strain: (+)  Bladder/ bowel: (--)  Falls: (+)  Night pain: (--)  Unexplained weight loss: (--)  General health: "Fair"    OBJECTIVE     Postural examination/scapula alignment: Rounded shoulder, Head forward and Slouched posture     Skin integrity: Normal    Edema: Normal B    Correction of posture: better with lumbar roll, standard    MOVEMENT LOSS (Breathing heavily; slight SOB)   ROM Loss   Flexion minimal loss   Extension major loss   Side bending Right moderate loss   Side bending Left moderate loss   Rotation Right moderate loss   Rotation Left moderate loss     Lower Extremity Strength  Right LE  Left LE    Hip flexion: 4/5 Hip flexion: 4/5   Hip extension:  4/5 Hip extension: 4/5   Hip abduction: 4/5 Hip abduction: 4/5   Hip adduction:  4/5 Hip adduction:  4/5   Hip Internal rotation   4/5 Hip Internal " rotation 4/5   Knee Flexion 4/5 Knee Flexion 4/5   Knee Extension 4+/5 Knee Extension 4+/5   Ankle dorsiflexion: 4+/5 Ankle dorsiflexion: 4+/5   Ankle plantarflexion: 5/5 Ankle plantarflexion: 5/5     5x sit to stand tet for functional legs strength: 17 seconds w/o increased LBP, but with some SOB and heavy breathing.     GAIT:  Assistive Device used: none  Level of Assistance: independent  Patient displays the following gait deviations:  unsteady gait, decreased step length, antalgic gait and forward trunk lean.     Special Tests:   Test Name  Test Result   Prone Instability Test (+)   SI Joint Provocation Test (+)   Straight Leg Raise (--)   Neural Tension Test DNT   Crossed Straight Leg Raise DNT   Walking on toes (+) Very unsteable   Walking on heels  (+) Very Unstable       NEUROLOGICAL SCREENING     Sensory deficit:  Sensation diminished B; slight impaired at different areas  Bilaterally    Reflexes:     Left Right   Patella Tendon 1+ 1+   Achilles Tendon 1+ 1+   Babinski  (--) (--)   Clonus (--) (--)     REPEATED TEST MOVEMENTS: (Fatigued)  Repeated Flexion in Standing no effect; slightly better   Repeated Extension in Standing no worse but tired   Repeated Flexion in lying no effect   Repeated Extension in lying  worse       Baseline Isometric Testing on Med X equipment: Testing administered by PT  Date of testin/10/19  ROM 0-36 deg   Max Peak Torque 118    Min Peak Torque 43    Flex/Ext Ratio 2.74:1   % below normative data 7     Starting weight 45-50#    CMS Impairment/Limitation/Restriction for FOTO Lumbar Spine Survey  Status Limitation G-Code CMS Severity Modifier  Intake 39% 61% Current Status CL - At least 60 percent but less than 80 percent  Predicted 48% 52% Goal Status+ CK - At least 40 percent but less than 60 percent  D/C Status CL **only report if this is a one time visit    Treatment     Total Treatment time separate from Evaluation: 35 minutes      Henrietta received therapeutic exercises to  develop/improve posture, lumbar/cervical ROM, strength and muscular endurance for 25 minutes including the following exercises:     Med x dynamic exercise and baseline IM test  [facility] 12/10/2019   Visit Number 1   VAS Pain Rating 0   Recumbent Bike Seat Pos. -   Time -   Extension in Lying -   Extension in Standing -   Flexion in Lying -   Flexion in Sitting -   Lumbar Extension Seat Pad 1   Femur Restraint 7   Top Dead Center 24   Counterweight 272   Lumbar Flexion 36   Lumbar Extension 0   Lumbar Peak Torque 118   Min Torque 43   Test Percent Below Normative Data 7   Lumbar Weight 45   Repetitions -   Rating of Perceived Exertion -   Ice - Z Lie (in min.) 10       HEP:  LTR  Pelvic Tilts + marching (Next visit)  Red Theraband Supine Clams (Next visit)  Bridges (next visit)  Seated Trunk Flexion      Written Home Exercises Provided: Patient instructed to cont prior HEP.  Exercises were reviewed and Henrietta was able to demonstrate them prior to the end of the session.  Henrietta demonstrated fair  understanding of the education provided.     See EMR under Patient Instructions for exercises provided 12/10/2019.      Education provided:   - Patient received education regarding proper posture and body mechanics.  Patient was given top 10 tips handout which discusses posture seated, standing, lifting correctly, components of exercise, importance of nutrition and hydration, and importance of sleep.  - Harmony roll tried, recommended, and purchase information was provided.    - Patient received a handout regarding anticipated muscular soreness following the isometric test and strategies for management were reviewed with patient including stretching, using ice and scheduled rest.   - Patient received education on the [facility] program, purpose of the isometric test, progression of back strengthening as well as wellness approach and systemic strengthening.  Details of the program were discussed.  Reviewed that  patient should feel support/pressure from med ex restraints but no pain or discomfort and patient expressed understanding.    Henrietta received cold pack for 10 minutes to Lower back.    Assessment   Henrietta is a 79 y.o. female referred to [facility] for a seconds time with medical diagnoses of  Lumbar radiculopathy, DDD (degenerative disc disease) lumbar, and Sacroiliac joint pain. Pt presents with  Multiple co-morbidities, and high falls risk per observation of gait, and reports multiple falls in the past year (1x recently). Patient presented with deficits and pain with lumbar spine AROM, deficits with B LE's strength (and functional legs strength), deficits with functional activities tolerance per reports/presentation, and impaired gait, balance and  Mobility. Repeated movement testing revealed a 1 PEN pain pattern. Lumbar spine extensors IM strength was assessed with medx, with results at 7% below normative data for age range. Possible LE's compensation was suspected however.  FOTO Lumbar Spine Survey revealed a 61% limitations, indicating mod impairment with daily function. Patient will benefit from additional training with breathing exercises and activity pacing,  to avoid SOB and rapid fatigue. Furthermore, patient will benefit from lumbopelvic stabilization exercises to improve mobility and increase muscle endurance, to increase capacity for functional mobility/activities tolerance.     Pain Pattern: 1 PEN    Pt prognosis is Fair to good  Pt will benefit from skilled outpatient Physical Therapy to address the deficits stated above and in the chart below, provide pt/family education, and to maximize pt's level of independence. Based on the above history and physical examination an active physical therapy program is recommended.  Pt will continue to benefit from skilled outpatient physical therapy to address the deficits listed below in the chart, provide pt/family education and to maximize pt's level  of independence in the home and community environment. .       Plan of care discussed with patient: Yes  Pt's spiritual, cultural and educational needs considered and patient is agreeable to the plan of care and goals as stated below:     Anticipated Barriers for therapy: Multiple co-morbidities    PT Evaluation Completed? Yes    Medical necessity is demonstrated by the following problem list.    Pt presents with the following impairments:     History  Co-morbidities and personal factors that may impact the plan of care Co-morbidities:   Multiple co-morbidities    Personal Factors:   age  lifestyle     moderate   Examination  Body Structures and Functions, activity limitations and participation restrictions that may impact the plan of care Body Regions:   back  lower extremities  trunk    Body Systems:    ROM  strength  balance  gait  transfers  transitions    Participation Restrictions:   Precaution with exertion/SOB due to co-morbidities    Activity limitations:   Learning and applying knowledge  no deficits    General Tasks and Commands  no deficits    Communication  no deficits    Mobility  lifting and carrying objects  walking  driving (bike, car, motorcycle)    Self care  no deficits    Domestic Life  shopping  cooking  doing house work (cleaning house, washing dishes, laundry)  assisting others    Interactions/Relationships  no deficits    Life Areas  no deficits    Community and Social Life  recreation and leisure         moderate   Clinical Presentation stable and uncomplicated moderate   Decision Making/ Complexity Score: moderate       GOALS: Pt is in agreement with the following goals.    Short term goals:  6 weeks or 10 visits   1.  Pt will demonstrate increased lumbar ROM by at least 3 degrees from the initial ROM value with improvements noted in functional ROM and ability to perform ADLs.  2.  Pt will demonstrate increased maximum isometric torque value by 10% when compared to the initial value  "resulting in improved ability to perform bending, lifting, and carrying activities safely, confidently.    3.  Patient report a reduction in worst pain score by 1-2 points for improved tolerance for functional ativities.  4.  Pt able to perform HEP correctly with minimal cueing or supervision from therapist to encourage independent management of symptoms.       Long term goals: 10 weeks or 20 visits   1. Pt will demonstrate increased lumbar ROM by at least 6 degrees from initial ROM value, resulting in improved ability to perform functional fwd bending while standing and sitting.   2. Pt will demonstrate increased maximum isometric torque value by 20% when compared to the initial value resulting in improved ability to perform bending, lifting, and carrying activities safely, confidently.  3. Pt to demonstrate ability to independently control and reduce their pain through posture positioning and mechanical movements throughout a typical day.  4.  Pt will demonstrate reduced pain and improved functional outcomes as reported on the FOTO by reaching a score of CK = at least 40% but < 60% impaired, limited or restricted or less in order to demonstrate subjective improvement in pt's condition.    5. Pt will demonstrate independence with the HEP at discharge  6.  Patient will improve 5x STS test to 14 seconds or less, to demonstrate improved functional legs strength.       Plan   Outpatient physical therapy 2x week for 10 weeks or 20 visits to include the following:   - Patient education  - Therapeutic exercise  - Manual therapy  - Performance testing   - Neuromuscular Re-education  - Therapeutic activity   - Modalities    Pt may be seen by PTA as part of the rehabilitation team.     Therapist: Santana Rodriguez, PT  12/10/2019    "I certify the need for these services furnished under this plan of treatment and while under my care."    ____________________________________  Physician/Referring " Practitioner    _______________  Date of Signature

## 2019-12-16 ENCOUNTER — CLINICAL SUPPORT (OUTPATIENT)
Dept: REHABILITATION | Facility: OTHER | Age: 79
End: 2019-12-16
Attending: NURSE PRACTITIONER
Payer: MEDICARE

## 2019-12-16 DIAGNOSIS — R53.1 WEAKNESS: ICD-10-CM

## 2019-12-16 DIAGNOSIS — R26.9 ABNORMALITY OF GAIT AND MOBILITY: ICD-10-CM

## 2019-12-16 DIAGNOSIS — R68.89 DECREASED FUNCTIONAL ACTIVITY TOLERANCE: ICD-10-CM

## 2019-12-16 DIAGNOSIS — M53.86 DECREASED ROM OF LUMBAR SPINE: ICD-10-CM

## 2019-12-16 PROBLEM — M47.816 LUMBAR SPONDYLOSIS: Status: RESOLVED | Noted: 2019-01-31 | Resolved: 2019-12-16

## 2019-12-16 PROCEDURE — 97110 THERAPEUTIC EXERCISES: CPT | Mod: HCNC | Performed by: PHYSICAL THERAPIST

## 2019-12-16 NOTE — PROGRESS NOTES
[facility]      Name: Henrietta Villasenor  Clinic Number: [id number]    Therapy Diagnosis:   Encounter Diagnoses   Name Primary?    Decreased ROM of lumbar spine     Weakness     Abnormality of gait and mobility     Decreased functional activity tolerance      Physician: Maria R Haskins,*    Visit Date: 2019      Physician Orders: PT Eval and Treat      Medical Diagnosis from Referral:   M54.16 (ICD-10-CM) - Lumbar radiculopathy  M51.36 (ICD-10-CM) - DDD (degenerative disc disease), lumbar  M53.3 (ICD-10-CM) - Sacroiliac joint pain        Evaluation Date: 12/10/2019  Authorization Period Expiration: 19  Plan of Care Expiration: 3/10/2020  Reassessment Due: 1/10/2020  Visit # / Visits authorized:      Time In: 1337  Time Out: 1440  Total Billable Time: 54 minutes     Precautions: Multiple co-morbidiities: cardiac, Endocrine, Neuro  Balance ,Fall, HTN, CAD, CHF, DM II     Pattern of pain determined: 1 PEN      Subjective   Henrietta reports her back has been hurting. She has not been doing any of her exercises at home    Patient reports tolerating previous visit well,no soreness after eval  Patient reports their pain to be 1/10 on a 0-10 scale with 0 being no pain and 10 being the worst pain imaginable.  Pain Location: bilateral low back     Occupation: Retired  Leisure: Sikh      Pts goals: Clean house standing long, outing to visit different places       Objective     Baseline Isometric Testing on Med X equipment: Testing administered by PT  Date of testin/10/19  ROM 0-36 deg   Max Peak Torque 118    Min Peak Torque 43    Flex/Ext Ratio 2.74:1   % below normative data 7         Treatment    Pt was instructed in and performed the following:     Henrietta received therapeutic exercises to develop/improved posture, cardiovascular endurance, muscular endurance, lumbar/cervical ROM, strength and muscular endurance for 54 minutes including the following exercises:      LTR x 15  Pelvic Tilts + marching x 10  Red Theraband Supine Clams x 10  Bridges x 10  Seated Trunk Flexion x 15    [facility] 12/16/2019   Visit Number 2   VAS Pain Rating 1   Recumbent Bike Seat Pos. -   Time 10   Lumbar Weight 50   Repetitions 20   Rating of Perceived Exertion 5   Ice - Z Lie (in min.) 10                  Peripheral muscle strengthening which included 1 set of 15-20 repetitions at a slow, controlled 10-13 second per rep pace focused on strengthening supporting musculature for improved body mechanics and functional mobility.  Pt and therapist focused on proper form during treatment to ensure optimal strengthening of each targeted muscle group.  Machines were utilized including torso rotation, leg extension, leg curl, chest press, upright row. Tricep extension, bicep curl, leg press, and hip abduction added visit 3        Home Exercises Provided and Patient Education Provided     Education provided:   - importance of compliance with HEP, reviewed exercises    Written Home Exercises Provided: Patient instructed to cont prior HEP.  Exercises were reviewed and Henrietta was able to demonstrate them prior to the end of the session.  Henrietta demonstrated good  understanding of the education provided.     See EMR under Patient Instructions for exercises provided prior visit.          Assessment     Pt tolerated therapy well. She has not been compliant with her HEP. Reviewed exercises today. Pt completed 20 reps at 50ft# on medx lumbar extension. Will increase to 50% max torque next visit, using 59ft#.   Patient is making good progress towards established goals.  Pt will continue to benefit from skilled outpatient physical therapy to address the deficits stated in the impairment chart, provide pt/family education and to maximize pt's level of independence in the home and community environment.     Anticipated Barriers for therapy: multiple co-morbidities  Pt's spiritual, cultural and educational  needs considered and pt agreeable to plan of care and goals as stated below:       Short term goals:  6 weeks or 10 visits   1.  Pt will demonstrate increased lumbar ROM by at least 3 degrees from the initial ROM value with improvements noted in functional ROM and ability to perform ADLs. (progressing, Not met)  2.  Pt will demonstrate increased maximum isometric torque value by 10% when compared to the initial value resulting in improved ability to perform bending, lifting, and carrying activities safely, confidently.(progressing, Not met)     3.  Patient report a reduction in worst pain score by 1-2 points for improved tolerance for functional ativities.(progressing, Not met)  4.  Pt able to perform HEP correctly with minimal cueing or supervision from therapist to encourage independent management of symptoms. (progressing, Not met)        Long term goals: 10 weeks or 20 visits   1. Pt will demonstrate increased lumbar ROM by at least 6 degrees from initial ROM value, resulting in improved ability to perform functional fwd bending while standing and sitting. (progressing, Not met)  2. Pt will demonstrate increased maximum isometric torque value by 20% when compared to the initial value resulting in improved ability to perform bending, lifting, and carrying activities safely, confidently.(progressing, Not met)  3. Pt to demonstrate ability to independently control and reduce their pain through posture positioning and mechanical movements throughout a typical day.(progressing, Not met)  4.  Pt will demonstrate reduced pain and improved functional outcomes as reported on the FOTO by reaching a score of CK = at least 40% but < 60% impaired, limited or restricted or less in order to demonstrate subjective improvement in pt's condition.  (progressing, Not met)  5. Pt will demonstrate independence with the HEP at discharge(progressing, Not met)  6.  Patient will improve 5x STS test to 14 seconds or less, to demonstrate  improved functional legs strength. (progressing, Not met)        Plan   Continue with established Plan of Care towards established PT goals.

## 2019-12-23 ENCOUNTER — OFFICE VISIT (OUTPATIENT)
Dept: PODIATRY | Facility: CLINIC | Age: 79
End: 2019-12-23
Payer: MEDICARE

## 2019-12-23 VITALS
HEART RATE: 70 BPM | BODY MASS INDEX: 34.72 KG/M2 | HEIGHT: 66 IN | WEIGHT: 216 LBS | SYSTOLIC BLOOD PRESSURE: 199 MMHG | DIASTOLIC BLOOD PRESSURE: 99 MMHG

## 2019-12-23 DIAGNOSIS — E11.42 DIABETIC POLYNEUROPATHY ASSOCIATED WITH TYPE 2 DIABETES MELLITUS: Primary | ICD-10-CM

## 2019-12-23 DIAGNOSIS — B35.1 ONYCHOMYCOSIS DUE TO DERMATOPHYTE: ICD-10-CM

## 2019-12-23 PROCEDURE — 11721 DEBRIDE NAIL 6 OR MORE: CPT | Mod: Q9,PBBFAC,HCNC | Performed by: PODIATRIST

## 2019-12-23 PROCEDURE — 11721 DEBRIDE NAIL 6 OR MORE: CPT | Mod: Q9,S$PBB,HCNC, | Performed by: PODIATRIST

## 2019-12-23 PROCEDURE — 99499 NO LOS: ICD-10-PCS | Mod: HCNC,S$GLB,, | Performed by: PODIATRIST

## 2019-12-23 PROCEDURE — 11721 PR DEBRIDEMENT OF NAILS, 6 OR MORE: ICD-10-PCS | Mod: Q9,S$PBB,HCNC, | Performed by: PODIATRIST

## 2019-12-23 PROCEDURE — 99999 PR PBB SHADOW E&M-EST. PATIENT-LVL III: CPT | Mod: PBBFAC,HCNC,, | Performed by: PODIATRIST

## 2019-12-23 PROCEDURE — 99499 UNLISTED E&M SERVICE: CPT | Mod: HCNC,S$GLB,, | Performed by: PODIATRIST

## 2019-12-23 PROCEDURE — 99999 PR PBB SHADOW E&M-EST. PATIENT-LVL III: ICD-10-PCS | Mod: PBBFAC,HCNC,, | Performed by: PODIATRIST

## 2019-12-23 NOTE — PROGRESS NOTES
Subjective:      Patient ID: Henrietta Villasenor is a 79 y.o. female.    Chief Complaint: Diabetes Mellitus (11/18/19 dr deven gilmore) and Nail Care    Henrietta is a 79 y.o. female who presents to the clinic for evaluation and treatment of high risk feet. Henrietta has a past medical history of Abnormality of lung (11/08/2011), Anxiety, Arthritis, CAD (coronary artery disease), Calculus of ureter (2/22/2011), CHF (congestive heart failure), Chronic back pain (7/29/2012), Colon polyp (9/2010; 11/2010), Colon polyps (7/29/2012), Coronary artery disease involving native coronary artery of native heart with angina pectoris, Depression, Diabetes mellitus, Diabetes mellitus type II, Diverticulitis large intestine (7/27/2015), Diverticulitis of large intestine without perforation or abscess without bleeding, Diverticulosis (09/25/2010; 11/02/2010; 11/08/2011; 7/29/2012), Duodenal disorder (08/25/2011), Duodenal ulcer, unspecified as acute or chronic, without hemorrhage, perforation, or obstruction (8/24/2011), E. 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), 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), Obesity, MARLON (obstructive sleep apnea), Pain, Peripheral neuropathy, Pneumonia, Polyneuropathy, Postherpetic neuralgia, Recurrent nephrolithiasis, S/P knee replacement (10/2/2012), S/P TKR (total knee replacement) (12/26/2012), Sensorineural hearing loss of both ears, Thyroid disease  (11/08/2011), Trouble in sleeping, Type II or unspecified type diabetes mellitus with neurological manifestations, not stated as uncontrolled(250.60), Type II or unspecified type diabetes mellitus with peripheral circulatory disorders, not stated as uncontrolled(250.70), and Type II or unspecified type diabetes mellitus with renal manifestations, not stated as uncontrolled(250.40). The patient's chief complaint is long, thick toenails. This patient has documented high risk feet requiring routine maintenance secondary to diabetes mellitis and those secondary complications of diabetes, as mentioned..       PCP: Deven Gilmore MD    Date Last Seen by PCP:   Chief Complaint   Patient presents with    Diabetes Mellitus     11/18/19 dr deven gilmore    Nail Care         Current shoe gear: casual shoes    Hemoglobin A1C   Date Value Ref Range Status   11/18/2019 9.0 (H) 4.0 - 5.6 % Final     Comment:     ADA Screening Guidelines:  5.7-6.4%  Consistent with prediabetes  >or=6.5%  Consistent with diabetes  High levels of fetal hemoglobin interfere with the HbA1C  assay. Heterozygous hemoglobin variants (HbS, HgC, etc)do  not significantly interfere with this assay.   However, presence of multiple variants may affect accuracy.     06/10/2019 7.9 (H) 4.0 - 5.6 % Final     Comment:     ADA Screening Guidelines:  5.7-6.4%  Consistent with prediabetes  >or=6.5%  Consistent with diabetes  High levels of fetal hemoglobin interfere with the HbA1C  assay. Heterozygous hemoglobin variants (HbS, HgC, etc)do  not significantly interfere with this assay.   However, presence of multiple variants may affect accuracy.     02/13/2019 7.3 (H) 4.0 - 5.6 % Final     Comment:     ADA Screening Guidelines:  5.7-6.4%  Consistent with prediabetes  >or=6.5%  Consistent with diabetes  High levels of fetal hemoglobin interfere with the HbA1C  assay. Heterozygous hemoglobin variants (HbS, HgC, etc)do  not significantly interfere with this assay.  "  However, presence of multiple variants may affect accuracy.         Review of Systems   Constitution: Negative for chills, decreased appetite and fever.   Cardiovascular: Negative for leg swelling.   Skin: Positive for dry skin and nail changes. Negative for color change, flushing, itching, poor wound healing and rash.   Musculoskeletal: Positive for arthritis and gout. Negative for back pain, falls, joint pain, joint swelling, muscle cramps and myalgias.   Gastrointestinal: Negative for nausea and vomiting.   Neurological: Positive for numbness and paresthesias. Negative for loss of balance.           Objective:       Vitals:    12/23/19 1315   BP: (!) 199/99   Pulse: 70   Weight: 98 kg (216 lb)   Height: 5' 6" (1.676 m)   PainSc: 0-No pain        Physical Exam   Constitutional: She is oriented to person, place, and time. She appears well-developed and well-nourished.   Cardiovascular:   Dorsalis pedis and posterior tibial pulses are diminished Bilaterally. Toes are cool to touch. Feet are warm proximally.There is decreased digital hair. Skin is atrophic, slightly hyperpigmented, and mildly edematous       Musculoskeletal: Normal range of motion. She exhibits no edema or tenderness.   Adequate joint range of motion without pain, limitation, nor crepitation Bilateral feet and ankle joints. Muscle strength is 5/5 in all groups bilaterally.      semi reducible IPJ digital contracture 2nd toe b/l    Neurological: She is alert and oriented to person, place, and time.   [place] 5.07 monofilamant testing is diminished Mynor feet. Sharp/dull sensation diminished Bilaterally. Light touch absent Bilaterally.       Skin: Skin is warm, dry and intact. No abrasion, no bruising, no burn, no ecchymosis and no lesion noted. She is not diaphoretic. No erythema. No pallor.   Nails x 10  are elongated by 2-4mm's, thickened by 2-5 mm's, dystrophic, and are darkened in  coloration . Xerosis Bilaterally. No open lesions " noted.     Psychiatric: She has a normal mood and affect. Her behavior is normal.   Nursing note and vitals reviewed.            Assessment:       Encounter Diagnoses   Name Primary?    Diabetic polyneuropathy associated with type 2 diabetes mellitus Yes    Onychomycosis due to dermatophyte          Plan:       Henrietta was seen today for diabetes mellitus and nail care.    Diagnoses and all orders for this visit:    Diabetic polyneuropathy associated with type 2 diabetes mellitus    Onychomycosis due to dermatophyte      I counseled the patient on her conditions, their implications and medical management.    Shoe inspection. Diabetic Foot Education. Patient reminded of the importance of good nutrition and blood sugar control to help prevent podiatric complications of diabetes. Patient instructed on proper foot hygeine. We discussed wearing proper shoe gear, daily foot inspections, never walking without protective shoe gear, never putting sharp instruments to feet    - With patient's permission, nails were aggressively reduced and debrided x 10 to their soft tissue attachment mechanically and with electric , removing all offending nail and debris. Patient relates relief following the procedure. She will continue to monitor the areas daily, inspect her feet, wear protective shoe gear when ambulatory, moisturizer to maintain skin integrity and follow in this office in approximately 2-3 months, sooner p.r.n.  .

## 2019-12-26 DIAGNOSIS — E11.42 DIABETIC POLYNEUROPATHY ASSOCIATED WITH TYPE 2 DIABETES MELLITUS: ICD-10-CM

## 2019-12-26 RX ORDER — DULOXETIN HYDROCHLORIDE 60 MG/1
60 CAPSULE, DELAYED RELEASE ORAL 2 TIMES DAILY
Qty: 180 CAPSULE | Refills: 3 | Status: SHIPPED | OUTPATIENT
Start: 2019-12-26 | End: 2020-01-08 | Stop reason: SDUPTHER

## 2019-12-26 NOTE — PROGRESS NOTES
Refill Routing Note     Medication(s) are not appropriate for processing by [facility]:    Medication Outside of Protocol (Cymbalta for neuropathy)    Appointments  past 12m or future 3m with PCP    Date Provider   Last Visit   11/18/2019 Jeannine Arriaga MD   Next Visit   1/23/2020 Jeannine Arriaga MD           Automatic Epic Protocol Generated Data:    Requested Prescriptions   Pending Prescriptions Disp Refills    DULoxetine (CYMBALTA) 60 MG capsule 180 capsule 3     Sig: Take 1 capsule (60 mg total) by mouth 2 (two) times daily.       Psychiatry: Antidepressants - SNRI Failed - 12/26/2019 12:30 PM        Failed - Last BP in normal range within 360 days     BP Readings from Last 3 Encounters:   12/23/19 (!) 199/99   12/03/19 (!) 176/104   11/18/19 120/60              Passed - Patient is at least 18 years old        Passed - Office visit in past 6 months or future 90 days     Recent Outpatient Visits            3 days ago Diabetic polyneuropathy associated with type 2 diabetes mellitus    [facility] Laura Sheridan, DPPIERRE    3 weeks ago Lumbar radiculopathy    [facility] [place] [address] Maria R Haskins, FNP    1 month ago Dry eye syndrome of both eyes    [facility] Optometry Zenaida Pabon, OD    1 month ago Diabetes mellitus due to abnormal insulin    [facility] Jeannine Arriaga MD    2 months ago Essential hypertension    [facility] Medicine Jeannine Arriaga MD          Future Appointments              Tomorrow Pilar Bejarano, PT [facility] [place] [address], [facility]    In 5 days MD Fran Wheat [facility] - Ophthalmology, [facility]    In 5 days Santana Rodriguez, PT [facility] [place] [address], [facility]    In 1 week Lyudmila Medellin, PT [facility] [place] [address], [facility]    In 2 weeks Lyudmila Medellin, PT [facility] [place] [address], [facility]    In 3 weeks Astrid  Elder, PTA [facility] [place] [address], [facility]    In 4 weeks MD Fran Crespo - Internal Medicine, [facility]    In 1 month Maria R Haskins, STERLING [facility] [place] [address], [facility]    In 2 months Laura Sheridan, TONY [facility] - Podiatry, [facility]                Passed - Cr is 1.4 or below and within 360 days     Creatinine   Date Value Ref Range Status   11/18/2019 1.4 0.5 - 1.4 mg/dL Final   09/10/2019 1.4 0.5 - 1.4 mg/dL Final   06/10/2019 1.5 (H) 0.5 - 1.4 mg/dL Final              Passed - eGFR within 360 days     eGFR if non    Date Value Ref Range Status   11/18/2019 36 (A) >60 mL/min/1.73 m^2 Final     Comment:     Calculation used to obtain the estimated glomerular filtration  rate (eGFR) is the CKD-EPI equation.      09/10/2019 36 (A) >60 mL/min/1.73 m^2 Final     Comment:     Calculation used to obtain the estimated glomerular filtration  rate (eGFR) is the CKD-EPI equation.      06/10/2019 33 (A) >60 mL/min/1.73 m^2 Final     Comment:     Calculation used to obtain the estimated glomerular filtration  rate (eGFR) is the CKD-EPI equation.        eGFR if    Date Value Ref Range Status   11/18/2019 41 (A) >60 mL/min/1.73 m^2 Final   09/10/2019 41 (A) >60 mL/min/1.73 m^2 Final   06/10/2019 38 (A) >60 mL/min/1.73 m^2 Final

## 2020-01-02 ENCOUNTER — HOSPITAL ENCOUNTER (EMERGENCY)
Facility: OTHER | Age: 80
Discharge: HOME | End: 2020-01-02
Attending: EMERGENCY MEDICINE
Payer: MEDICARE

## 2020-01-02 VITALS
OXYGEN SATURATION: 97 % | TEMPERATURE: 98 F | WEIGHT: 216 LBS | HEART RATE: 65 BPM | HEIGHT: 66 IN | SYSTOLIC BLOOD PRESSURE: 132 MMHG | DIASTOLIC BLOOD PRESSURE: 91 MMHG | RESPIRATION RATE: 16 BRPM | BODY MASS INDEX: 34.72 KG/M2

## 2020-01-02 DIAGNOSIS — E87.70 HYPERVOLEMIA, UNSPECIFIED HYPERVOLEMIA TYPE: ICD-10-CM

## 2020-01-02 DIAGNOSIS — R06.02 SOB (SHORTNESS OF BREATH): ICD-10-CM

## 2020-01-02 DIAGNOSIS — R42 LIGHTHEADEDNESS: Primary | ICD-10-CM

## 2020-01-02 DIAGNOSIS — R42 DIZZINESS: ICD-10-CM

## 2020-01-02 LAB
ALBUMIN SERPL BCP-MCNC: 3.2 G/DL (ref 3.5–5.2)
ALP SERPL-CCNC: 84 U/L (ref 55–135)
ALT SERPL W/O P-5'-P-CCNC: 10 U/L (ref 10–44)
ANION GAP SERPL CALC-SCNC: 12 MMOL/L (ref 8–16)
AST SERPL-CCNC: 18 U/L (ref 10–40)
BASOPHILS # BLD AUTO: 0.06 K/UL (ref 0–0.2)
BASOPHILS NFR BLD: 0.6 % (ref 0–1.9)
BILIRUB SERPL-MCNC: 0.4 MG/DL (ref 0.1–1)
BILIRUB UR QL STRIP: NEGATIVE
BNP SERPL-MCNC: 261 PG/ML (ref 0–99)
BUN SERPL-MCNC: 13 MG/DL (ref 8–23)
CALCIUM SERPL-MCNC: 9.6 MG/DL (ref 8.7–10.5)
CHLORIDE SERPL-SCNC: 101 MMOL/L (ref 95–110)
CLARITY UR: CLEAR
CO2 SERPL-SCNC: 28 MMOL/L (ref 23–29)
COLOR UR: YELLOW
CREAT SERPL-MCNC: 1.4 MG/DL (ref 0.5–1.4)
D DIMER PPP IA.FEU-MCNC: 1.37 MG/L FEU
DIFFERENTIAL METHOD: ABNORMAL
EOSINOPHIL # BLD AUTO: 0.2 K/UL (ref 0–0.5)
EOSINOPHIL NFR BLD: 1.7 % (ref 0–8)
ERYTHROCYTE [DISTWIDTH] IN BLOOD BY AUTOMATED COUNT: 13.2 % (ref 11.5–14.5)
EST. GFR  (AFRICAN AMERICAN): 41 ML/MIN/1.73 M^2
EST. GFR  (NON AFRICAN AMERICAN): 36 ML/MIN/1.73 M^2
EXTRA BLUE TOP RAINBOW: NORMAL
GLUCOSE SERPL-MCNC: 290 MG/DL (ref 70–110)
GLUCOSE UR QL STRIP: NEGATIVE
HCT VFR BLD AUTO: 39.7 % (ref 37–48.5)
HGB BLD-MCNC: 12.4 G/DL (ref 12–16)
HGB UR QL STRIP: NEGATIVE
IMM GRANULOCYTES # BLD AUTO: 0.06 K/UL (ref 0–0.04)
IMM GRANULOCYTES NFR BLD AUTO: 0.6 % (ref 0–0.5)
KETONES UR QL STRIP: NEGATIVE
LEUKOCYTE ESTERASE UR QL STRIP: NEGATIVE
LYMPHOCYTES # BLD AUTO: 2.5 K/UL (ref 1–4.8)
LYMPHOCYTES NFR BLD: 24.7 % (ref 18–48)
MCH RBC QN AUTO: 28.2 PG (ref 27–31)
MCHC RBC AUTO-ENTMCNC: 31.2 G/DL (ref 32–36)
MCV RBC AUTO: 90 FL (ref 82–98)
MONOCYTES # BLD AUTO: 1 K/UL (ref 0.3–1)
MONOCYTES NFR BLD: 10.5 % (ref 4–15)
NEUTROPHILS # BLD AUTO: 6.1 K/UL (ref 1.8–7.7)
NEUTROPHILS NFR BLD: 61.9 % (ref 38–73)
NITRITE UR QL STRIP: NEGATIVE
NRBC BLD-RTO: 0 /100 WBC
PH UR STRIP: 7 [PH] (ref 5–8)
PLATELET # BLD AUTO: 266 K/UL (ref 150–350)
PMV BLD AUTO: 9.9 FL (ref 9.2–12.9)
POTASSIUM SERPL-SCNC: 3.7 MMOL/L (ref 3.5–5.1)
PROT SERPL-MCNC: 7.6 G/DL (ref 6–8.4)
PROT UR QL STRIP: NEGATIVE
RBC # BLD AUTO: 4.4 M/UL (ref 4–5.4)
SODIUM SERPL-SCNC: 141 MMOL/L (ref 136–145)
SP GR UR STRIP: <=1.005 (ref 1–1.03)
TROPONIN I SERPL DL<=0.01 NG/ML-MCNC: 0.01 NG/ML (ref 0–0.03)
URN SPEC COLLECT METH UR: ABNORMAL
UROBILINOGEN UR STRIP-ACNC: NEGATIVE EU/DL
WBC # BLD AUTO: 9.9 K/UL (ref 3.9–12.7)

## 2020-01-02 PROCEDURE — 84484 ASSAY OF TROPONIN QUANT: CPT | Mod: HCNC

## 2020-01-02 PROCEDURE — 80053 COMPREHEN METABOLIC PANEL: CPT | Mod: HCNC

## 2020-01-02 PROCEDURE — 83880 ASSAY OF NATRIURETIC PEPTIDE: CPT | Mod: HCNC

## 2020-01-02 PROCEDURE — 85379 FIBRIN DEGRADATION QUANT: CPT | Mod: HCNC

## 2020-01-02 PROCEDURE — 93010 EKG 12-LEAD: ICD-10-PCS | Mod: HCNC,,, | Performed by: INTERNAL MEDICINE

## 2020-01-02 PROCEDURE — 99285 EMERGENCY DEPT VISIT HI MDM: CPT | Mod: 25,HCNC

## 2020-01-02 PROCEDURE — 93010 ELECTROCARDIOGRAM REPORT: CPT | Mod: HCNC,,, | Performed by: INTERNAL MEDICINE

## 2020-01-02 PROCEDURE — 85025 COMPLETE CBC W/AUTO DIFF WBC: CPT | Mod: HCNC

## 2020-01-02 PROCEDURE — 81003 URINALYSIS AUTO W/O SCOPE: CPT | Mod: HCNC

## 2020-01-02 PROCEDURE — 25500020 PHARM REV CODE 255: Mod: HCNC | Performed by: EMERGENCY MEDICINE

## 2020-01-02 PROCEDURE — 93005 ELECTROCARDIOGRAM TRACING: CPT | Mod: HCNC

## 2020-01-02 PROCEDURE — 63600175 PHARM REV CODE 636 W HCPCS: Mod: HCNC | Performed by: PHYSICIAN ASSISTANT

## 2020-01-02 PROCEDURE — 25000003 PHARM REV CODE 250: Mod: HCNC | Performed by: PHYSICIAN ASSISTANT

## 2020-01-02 PROCEDURE — 96374 THER/PROPH/DIAG INJ IV PUSH: CPT | Mod: HCNC

## 2020-01-02 PROCEDURE — 82962 GLUCOSE BLOOD TEST: CPT | Mod: HCNC

## 2020-01-02 RX ORDER — ACETAMINOPHEN 500 MG
1000 TABLET ORAL
Status: COMPLETED | OUTPATIENT
Start: 2020-01-02 | End: 2020-01-02

## 2020-01-02 RX ORDER — FUROSEMIDE 10 MG/ML
20 INJECTION INTRAMUSCULAR; INTRAVENOUS
Status: COMPLETED | OUTPATIENT
Start: 2020-01-02 | End: 2020-01-02

## 2020-01-02 RX ADMIN — IOHEXOL 75 ML: 350 INJECTION, SOLUTION INTRAVENOUS at 08:01

## 2020-01-02 RX ADMIN — ACETAMINOPHEN 1000 MG: 500 TABLET ORAL at 09:01

## 2020-01-02 RX ADMIN — FUROSEMIDE 20 MG: 10 INJECTION, SOLUTION INTRAMUSCULAR; INTRAVENOUS at 07:01

## 2020-01-02 NOTE — ED TRIAGE NOTES
"Pt reports "passing out" Wednesday at [employer]. Denies any injury, denies chest pain, but reports SOB at the time of the syncopal episode. Pt reports currently feeling dizzy, denies SOB, denies chest pain. Pt is AAO x 3, answers questions approrpiately  "

## 2020-01-02 NOTE — ED NOTES
Spoke with MD Cisco, regarding pt's CC, MD verbally states no need for stat CT at this time given pt's last well known.

## 2020-01-03 LAB — POCT GLUCOSE: 250 MG/DL (ref 70–110)

## 2020-01-03 NOTE — ED PROVIDER NOTES
"Encounter Date: 1/2/2020       History     Chief Complaint   Patient presents with    Dizziness     +Pt reporting syncopal episode yesterday AM. Since syncope, pt reporting dizziness, nausea, weakness, and vision changes. Denies head trauma, blood thinner use. Pt's face asymmetrical at baseline per pt report, states "It always looked like that after they removed my tumor".      Patient is a 79-year-old female with history of coronary artery disease, CHF, arthritis, anxiety, hypertension, hyperlipidemia, diabetes who presents to the emergency department with generalized weakness.  Patient states Tuesday night she was at [employer] sitting in a pew.  She states she became very weak and had a witnessed syncopal episode.  She states shortly thereafter, she decided to go home.  She states since then, she has had generalized weakness. She reports lightheadedness upon standing.  She denies any new neurological deficits.  She does report right-sided facial asymmetry from a previous brain tumor she had removed several years ago.  She denies any visual disturbance.  She denies headache. She denies chest pain. She does report shortness of breath over the last couple of days with mild lower extremity swelling. She denies fevers or chills.    The history is provided by the patient.     Review of patient's allergies indicates:   Allergen Reactions    Crestor [rosuvastatin]      Cramping in legs    Ezetimibe Diarrhea     Other reaction(s): abdominal pain, Diarrhea      Hydrocodone Itching    Lisinopril      Other reaction(s): cough      Sulfa (sulfonamide antibiotics) Itching and Rash           Sulfamethoxazole     Sulfamethoxazole-trimethoprim     Valsartan Swelling     Past Medical History:   Diagnosis Date    Abnormality of lung 11/08/2011    Stable bandlike opacities at the lung bases, most likely representing      Anxiety     Arthritis     CAD (coronary artery disease)     Calculus of ureter 2/22/2011    CHF " (congestive heart failure)     Chronic back pain 7/29/2012    Colon polyp 9/2010; 11/2010    Colon polyps 7/29/2012    Coronary artery disease involving native coronary artery of native heart with angina pectoris     Depression     Diabetes mellitus     Diabetes mellitus type II     Diverticulitis large intestine 7/27/2015    Diverticulitis of large intestine without perforation or abscess without bleeding     Diverticulosis 09/25/2010; 11/02/2010; 11/08/2011; 7/29/2012    Duodenal disorder 08/25/2011    Duodenal erosion noted on EGD.    Duodenal ulcer, unspecified as acute or chronic, without hemorrhage, perforation, or obstruction 8/24/2011    E. coli sepsis 12/2010    Due to left ureteral stone with left nephrostomy tube - hospitalized in [place]    Esophageal dysmotility 01/24/2012    Noted on upper GI-barium swallow.    Facial weakness 1969    Left facial weakness s/p left mastoidectomy in ~ 1969.    Fatty liver 11/08/2011    Reported on CT-abdomen and in 06/2012 Gastro clinic visit note.    Gastric polyp 09/29/2010    GERD (gastroesophageal reflux disease) 7/29/2012    Hepatomegaly 11/08/2011    Reported on CT-abdomen    Herpes zoster with other nervous system complications(053.19) 2/28/2011    Hiatal hernia 06/26/2006; 09/29/2010; 08/25/2011    Noted on barium swallow 2006; noted on  EGD 2011.   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    Trouble in sleeping     Type II or unspecified type diabetes mellitus with neurological manifestations, not stated as uncontrolled(250.60)     Type II or unspecified type diabetes mellitus with peripheral circulatory disorders, not stated as uncontrolled(250.70)     Type II or unspecified type diabetes mellitus with renal manifestations, not stated as uncontrolled(250.40)      Past Surgical History:   Procedure Laterality Date    BELPHAROPTOSIS REPAIR      s/p LAMBERTO levator repair - Dr. Dejesus    CARDIAC CATHETERIZATION      CARPAL TUNNEL RELEASE  3/13/2012    CATARACT EXTRACTION W/  INTRAOCULAR LENS IMPLANT Right 10/31/2018        CATARACT EXTRACTION W/  INTRAOCULAR LENS IMPLANT Left 03/22/2018        CHOLECYSTECTOMY      COLONOSCOPY N/A 11/16/2016    Procedure: COLONOSCOPY;  Surgeon: Chris Storey MD;  [facility]  Service: Endoscopy;  Laterality: N/A;    COLONOSCOPY N/A 6/14/2017    Procedure: COLONOSCOPY;  Surgeon: Chris Storey MD;  [facility]  Service: Endoscopy;  Laterality: N/A;  colonoscopy in 3 months with better bowel prep. - Split PEG prep ordered    EXTRACORPOREAL SHOCK WAVE LITHOTRIPSY      INTRAOCULAR PROSTHESES INSERTION Right 10/31/2018    Procedure: INSERTION-INTRAOCULAR LENS (IOL);  Surgeon: Keysha Valle MD;  Location: [facility];  Service: Ophthalmology;  Laterality: Right;    JOINT REPLACEMENT      mastoid tumor removal  1969    Left mastoidectomy with residual left facial weakness.    NEPHROSTOMY      OOPHORECTOMY      PHACOEMULSIFICATION OF CATARACT Right 10/31/2018    Procedure: PHACOEMULSIFICATION-ASPIRATION-CATARACT;  Surgeon: Keysha Valle MD;  Location: [facility];  Service: Ophthalmology;  Laterality: Right;    TOTAL ABDOMINAL HYSTERECTOMY   1969    TAHBSO    TOTAL KNEE ARTHROPLASTY  9/13/2012    Left    TRANSFORAMINAL EPIDURAL INJECTION OF STEROID Bilateral 9/17/2019    Procedure: INJECTION, STEROID, EPIDURAL, TRANSFORAMINAL APPROACH;  Surgeon: Oscar Ravi MD;  Location: [facility];  Service: Pain Management;  Laterality: Bilateral;  B/L TFESI L4/L5     Family History   Problem Relation Age of Onset    Sleep apnea Sister     Diabetes Sister     Cancer Mother         brain tumor    Hypertension Mother     Heart disease Father     Heart attack Father     Dementia Brother     Lupus Brother     Frontotemporal dementia Brother     No Known Problems Daughter     No Known Problems Son     Diabetes Sister     Lupus Sister     Breast cancer Sister     Diabetes Sister     Cancer Sister         breast cancer    Heart attack Sister     Diabetes Sister     Liver cancer Brother     Drug abuse Brother     Alcohol abuse Brother     Cirrhosis Brother     Drug abuse Brother     No Known Problems Daughter     No Known Problems Son     No Known Problems Son     No Known Problems Maternal Grandmother     No Known Problems Maternal Grandfather     No Known Problems Paternal Grandmother     No Known Problems Paternal Grandfather     Diabetes Brother     Dementia Brother     Diabetes Other     Ovarian cancer Other         Niece     Breast cancer Maternal Aunt     No Known Problems Maternal Uncle     No Known Problems Paternal Aunt     No Known Problems Paternal Uncle     Glaucoma Neg Hx     Blindness Neg Hx     Celiac disease Neg Hx     Colon cancer Neg Hx     Colon polyps Neg Hx     Esophageal cancer Neg Hx     Inflammatory bowel disease Neg Hx     Liver disease Neg Hx     Rectal cancer Neg Hx     Stomach cancer Neg Hx     Ulcerative colitis Neg Hx     Melanoma Neg Hx     Multiple sclerosis Neg Hx     Psoriasis Neg Hx     Amblyopia Neg Hx     Cataracts Neg Hx     Macular degeneration Neg Hx     Retinal  detachment Neg Hx     Strabismus Neg Hx     Stroke Neg Hx     Thyroid disease Neg Hx      Social History     Tobacco Use    Smoking status: Former Smoker     Packs/day: 1.00     Years: 15.00     Pack years: 15.00     Types: Cigarettes     Last attempt to quit: 2000     Years since quittin.4    Smokeless tobacco: Never Used   Substance Use Topics    Alcohol use: No    Drug use: No     Review of Systems   Constitutional: Positive for activity change and fatigue. Negative for appetite change, chills and fever.   HENT: Negative for congestion, ear discharge, ear pain, postnasal drip, rhinorrhea, sore throat and trouble swallowing.    Respiratory: Positive for shortness of breath.    Cardiovascular: Negative for chest pain.   Gastrointestinal: Negative for abdominal pain, blood in stool, constipation, diarrhea, nausea and vomiting.   Genitourinary: Negative for dysuria, flank pain and hematuria.   Musculoskeletal: Negative for back pain, neck pain and neck stiffness.   Skin: Negative for rash and wound.   Neurological: Positive for weakness (Generalized) and light-headedness. Negative for dizziness and headaches.       Physical Exam     Initial Vitals   BP Pulse Resp Temp SpO2   20 1634 20 1634 20 1634 20 1634 20 1735   (S) (!) 204/95 77 18 98.6 °F (37 °C) 97 %      MAP       --                Physical Exam    Nursing note and vitals reviewed.  Constitutional: She appears well-developed and well-nourished. She is not diaphoretic.  Non-toxic appearance. No distress.   Elderly, well-appearing   HENT:   Head: Normocephalic.   Right Ear: Hearing and external ear normal.   Left Ear: Hearing and external ear normal.   Nose: Nose normal.   Mouth/Throat: Uvula is midline, oropharynx is clear and moist and mucous membranes are normal. No oropharyngeal exudate.   Eyes: Conjunctivae and EOM are normal. Pupils are equal, round, and reactive to light.   Neck: Normal range of motion.    Cardiovascular: Normal rate and regular rhythm.   Nonpitting lower extremity edema that patient reports is baseline   Pulmonary/Chest: Breath sounds normal.   Abdominal: Soft. Bowel sounds are normal. There is no tenderness.   Lymphadenopathy:     She has no cervical adenopathy.   Neurological: She is alert and oriented to person, place, and time. She has normal strength. GCS score is 15. GCS eye subscore is 4. GCS verbal subscore is 5. GCS motor subscore is 6.   Baseline right-sided facial asymmetry    Normal finger to nose. Normal rapid hand motions.  Normal gait.  Negative pronator drift.  Normal strength in upper and lower extremities.   Skin: Skin is warm and dry. Capillary refill takes less than 2 seconds.   Psychiatric: She has a normal mood and affect.         ED Course   Procedures  Labs Reviewed   CBC W/ AUTO DIFFERENTIAL - Abnormal; Notable for the following components:       Result Value    Mean Corpuscular Hemoglobin Conc 31.2 (*)     Immature Granulocytes 0.6 (*)     Immature Grans (Abs) 0.06 (*)     All other components within normal limits   COMPREHENSIVE METABOLIC PANEL - Abnormal; Notable for the following components:    Glucose 290 (*)     Albumin 3.2 (*)     eGFR if  41 (*)     eGFR if non  36 (*)     All other components within normal limits   B-TYPE NATRIURETIC PEPTIDE - Abnormal; Notable for the following components:     (*)     All other components within normal limits   URINALYSIS, REFLEX TO URINE CULTURE - Abnormal; Notable for the following components:    Specific Gravity, UA <=1.005 (*)     All other components within normal limits    Narrative:     Preferred Collection Type->Urine, Clean Catch   D DIMER, QUANTITATIVE - Abnormal; Notable for the following components:    D-Dimer 1.37 (*)     All other components within normal limits   TROPONIN I   BLUE-TOP TUBE   D DIMER, QUANTITATIVE     EKG Readings: (Independently Interpreted)   Initial Reading:  No STEMI. Rhythm: Normal Sinus Rhythm. Heart Rate: 74.       Imaging Results          CTA Chest Non-Coronary (PE Study) (Final result)  Result time 01/02/20 21:07:26    Final result by Marvin Levy MD (01/02/20 21:07:26)                 Impression:      1. No evidence of PE.  2. Mild plate atelectasis, otherwise no acute intrathoracic abnormalities identified.      Electronically signed by: Marvin Levy MD  Date:    01/02/2020  Time:    21:07             Narrative:    EXAMINATION:  CTA CHEST NON CORONARY    CLINICAL HISTORY:  Chest pain, acute, PE suspected, intermed prob, positive D-dimer;shortness of breath;    TECHNIQUE:  Low dose axial images, sagittal and coronal reformations were obtained from the thoracic inlet to the lung bases following the IV administration of 75 mL of Omnipaque 350.  Contrast timing was optimized to evaluate the pulmonary arteries.  MIP images were performed.    COMPARISON:  None    FINDINGS:  Structures at the base of the neck are unremarkable.  Aorta is non-aneurysmal.  The heart is normal in size without pericardial effusion.  Aortic and coronary artery calcification is seen.  No intraluminal filling defects within the pulmonary arteries to suggest pulmonary thromboembolism.   There is no evidence of mediastinal, axillary, or hilar lymph node enlargement.  The esophagus is unremarkable along its course.    The trachea and bronchi are patent.  The lungs are symmetrically expanded.  There is mild plate atelectasis seen within the lower lobes and lingula.  No evidence of focal consolidation, pneumothorax, or pleural effusion.    The visualized abdominal structures are unremarkable.  Osseous structures demonstrate degenerative change.  Extrathoracic soft tissues are unremarkable.                               X-Ray Chest PA And Lateral (Final result)  Result time 01/02/20 18:47:35    Final result by Román Case MD (01/02/20 18:47:35)                 Impression:      No acute  "cardiopulmonary finding.      Electronically signed by: Román Case MD  Date:    01/02/2020  Time:    18:47             Narrative:    EXAMINATION:  XR CHEST PA AND LATERAL    CLINICAL HISTORY:  Provided history is "  Shortness of breath".    TECHNIQUE:  Frontal and lateral views of the chest were performed.    COMPARISON:  12/31/2018.    FINDINGS:  Cardiac silhouette is stable and not significantly enlarged.  Atherosclerotic calcifications overlie the aortic arch.  There are diffusely coarsened interstitial lung markings.  No confluent area of consolidation.  No sizable pleural effusion.  No pneumothorax.  No detrimental change when compared with the prior study.                               CT Cervical Spine Without Contrast (Final result)  Result time 01/02/20 18:49:22    Final result by Marvin Levy MD (01/02/20 18:49:22)                 Impression:      No evidence of acute cervical spine fracture or dislocation.      Electronically signed by: Marvin Levy MD  Date:    01/02/2020  Time:    18:49             Narrative:    EXAMINATION:  CT CERVICAL SPINE WITHOUT CONTRAST    CLINICAL HISTORY:  C-spine trauma, NEXUS/CCR negative, low risk;    TECHNIQUE:  Low dose axial images, sagittal and coronal reformations were performed though the cervical spine.  Contrast was not administered.    COMPARISON:  None    FINDINGS:  No evidence of acute cervical spine fracture or dislocation.  Odontoid process is intact.  Craniocervical junction is unremarkable.  Cervical spine alignment is within normal limits.  Prominent multilevel degenerative changes are seen with DISH.    Surrounding soft tissues show no significant abnormalities.  Airway is patent.                               CT Head Without Contrast (Final result)  Result time 01/02/20 18:33:28    Final result by Marvin Levy MD (01/02/20 18:33:28)                 Impression:      No acute intracranial abnormalities identified.      Electronically signed " by: Marvin Levy MD  Date:    01/02/2020  Time:    18:33             Narrative:    EXAMINATION:  CT HEAD WITHOUT CONTRAST    CLINICAL HISTORY:  Dizziness;    TECHNIQUE:  Low dose axial images were obtained through the head.  Coronal and sagittal reformations were also performed. Contrast was not administered.    COMPARISON:  None.    FINDINGS:  There is mild generalized cerebral volume loss and chronic microvascular ischemic change.  No evidence acute/recent major vascular distribution cerebral infarction, intraparenchymal hemorrhage, or intra-axial space occupying lesion. The ventricular system is normal in size and configuration with no evidence of hydrocephalus. No effacement of the skull-base cisterns. No abnormal extra-axial fluid collections or blood products. Visualized paranasal sinuses and right mastoid air cells are clear.  Postsurgical changes are seen of resection of the left mastoid air cells.  The calvarium shows no significant abnormality.                              X-Rays:   Independently Interpreted Readings:   Other Readings:  No acute cardiopulmonary process    Medical Decision Making:   Initial Assessment:   Urgent evaluation of 79-year-old female with extensive medical history presents to the emergency department with generalized weakness and lightheadedness.  Patient is afebrile, nontoxic appearing, and hemodynamically stable.   Patient was initially seen in [facility].  Head CT and labs were obtained.    At this time, patient is asymptomatic.  Patient does have baseline facial asymmetry from previous brain surgery.  No new neurological deficits appreciated on exam.  Patient's complaint is lightheadedness and no true dizziness.  Did consider CVA, but doubt with patient's presentation.  Head CT reveals no acute intracranial abnormality.  EKG reveals no acute ischemia or dysrhythmia.  Troponin is not elevated.  BNP is mildly elevated, and I will give dose of Lasix at this time to  diurese and potentially decreased blood pressure.  Added on a D-dimer with patient's complaint of shortness of breath and a syncopal episode.  D-dimer is elevated.  CTA is obtained revealing no PE.  On repeat exam, patient states she is feeling much better.  Case discussed in depth with supervising physician.  Patient is given strict return precautions.  She is advised to follow up with PCP and cardiologist.  She is advised to return to the emergency department with any worsening symptoms or concerns.  Clinical Tests:   Lab Tests: Ordered and Reviewed  Radiological Study: Ordered and Reviewed                   ED Course as of Jan 03 0041   Thu Jan 02, 2020   [address] Henrietta Villasenor, 79 y.o.  presented to the ED complaining of dizziness since syncopal episode yesterday morning. Also has had SOB recently. History of a brain tumor status post removal. Took all meds today.     Patient seen and medically screened by myself in the Provider in Triage Sort system due to ED crowding.  Appropriate tests and/or medications ordered.  A medical screening exam has been performed.  The care will be assumed by myself or another provider when treatment space becomes available.  I am not assuming care of this patient at this time. 5:09 PM. PALLAVI WILEY        [NT]      ED Course User Index  [NT] Giovana Cooper PA-C                Clinical Impression:       ICD-10-CM ICD-9-CM   1. Lightheadedness R42 780.4   2. Dizziness R42 780.4   3. SOB (shortness of breath) R06.02 786.05   4. Hypervolemia, unspecified hypervolemia type E87.70 276.69                             Cassidy Kearney PA-C  01/03/20 0041

## 2020-01-08 ENCOUNTER — OFFICE VISIT (OUTPATIENT)
Dept: INTERNAL MEDICINE | Facility: CLINIC | Age: 80
End: 2020-01-08
Payer: MEDICARE

## 2020-01-08 VITALS
HEIGHT: 68 IN | DIASTOLIC BLOOD PRESSURE: 80 MMHG | HEART RATE: 71 BPM | WEIGHT: 217.06 LBS | BODY MASS INDEX: 32.9 KG/M2 | SYSTOLIC BLOOD PRESSURE: 130 MMHG | TEMPERATURE: 98 F | OXYGEN SATURATION: 98 %

## 2020-01-08 DIAGNOSIS — M54.9 OTHER CHRONIC BACK PAIN: Chronic | ICD-10-CM

## 2020-01-08 DIAGNOSIS — F39 MOOD DISORDER: ICD-10-CM

## 2020-01-08 DIAGNOSIS — I50.30 DIASTOLIC CONGESTIVE HEART FAILURE, UNSPECIFIED HF CHRONICITY: Primary | ICD-10-CM

## 2020-01-08 DIAGNOSIS — G89.29 OTHER CHRONIC BACK PAIN: Chronic | ICD-10-CM

## 2020-01-08 DIAGNOSIS — M10.9 GOUT, ARTHRITIS: ICD-10-CM

## 2020-01-08 DIAGNOSIS — E78.5 HYPERLIPIDEMIA, UNSPECIFIED HYPERLIPIDEMIA TYPE: ICD-10-CM

## 2020-01-08 DIAGNOSIS — I25.119 CORONARY ARTERY DISEASE INVOLVING NATIVE CORONARY ARTERY OF NATIVE HEART WITH ANGINA PECTORIS: ICD-10-CM

## 2020-01-08 DIAGNOSIS — E11.42 DIABETIC POLYNEUROPATHY ASSOCIATED WITH TYPE 2 DIABETES MELLITUS: ICD-10-CM

## 2020-01-08 DIAGNOSIS — G47.33 OSA ON CPAP: Chronic | ICD-10-CM

## 2020-01-08 DIAGNOSIS — I10 ESSENTIAL HYPERTENSION: Chronic | ICD-10-CM

## 2020-01-08 PROCEDURE — 3075F SYST BP GE 130 - 139MM HG: CPT | Mod: HCNC,CPTII,S$GLB, | Performed by: INTERNAL MEDICINE

## 2020-01-08 PROCEDURE — 3079F DIAST BP 80-89 MM HG: CPT | Mod: HCNC,CPTII,S$GLB, | Performed by: INTERNAL MEDICINE

## 2020-01-08 PROCEDURE — 99499 UNLISTED E&M SERVICE: CPT | Mod: HCNC,S$GLB,, | Performed by: INTERNAL MEDICINE

## 2020-01-08 PROCEDURE — 1159F MED LIST DOCD IN RCRD: CPT | Mod: HCNC,S$GLB,, | Performed by: INTERNAL MEDICINE

## 2020-01-08 PROCEDURE — 1125F PR PAIN SEVERITY QUANTIFIED, PAIN PRESENT: ICD-10-PCS | Mod: HCNC,S$GLB,, | Performed by: INTERNAL MEDICINE

## 2020-01-08 PROCEDURE — 99499 RISK ADDL DX/OHS AUDIT: ICD-10-PCS | Mod: HCNC,S$GLB,, | Performed by: INTERNAL MEDICINE

## 2020-01-08 PROCEDURE — 1101F PR PT FALLS ASSESS DOC 0-1 FALLS W/OUT INJ PAST YR: ICD-10-PCS | Mod: HCNC,CPTII,S$GLB, | Performed by: INTERNAL MEDICINE

## 2020-01-08 PROCEDURE — 1159F PR MEDICATION LIST DOCUMENTED IN MEDICAL RECORD: ICD-10-PCS | Mod: HCNC,S$GLB,, | Performed by: INTERNAL MEDICINE

## 2020-01-08 PROCEDURE — 99999 PR PBB SHADOW E&M-EST. PATIENT-LVL V: CPT | Mod: PBBFAC,HCNC,, | Performed by: INTERNAL MEDICINE

## 2020-01-08 PROCEDURE — 3079F PR MOST RECENT DIASTOLIC BLOOD PRESSURE 80-89 MM HG: ICD-10-PCS | Mod: HCNC,CPTII,S$GLB, | Performed by: INTERNAL MEDICINE

## 2020-01-08 PROCEDURE — 3075F PR MOST RECENT SYSTOLIC BLOOD PRESS GE 130-139MM HG: ICD-10-PCS | Mod: HCNC,CPTII,S$GLB, | Performed by: INTERNAL MEDICINE

## 2020-01-08 PROCEDURE — 99214 OFFICE O/P EST MOD 30 MIN: CPT | Mod: HCNC,S$GLB,, | Performed by: INTERNAL MEDICINE

## 2020-01-08 PROCEDURE — 1101F PT FALLS ASSESS-DOCD LE1/YR: CPT | Mod: HCNC,CPTII,S$GLB, | Performed by: INTERNAL MEDICINE

## 2020-01-08 PROCEDURE — 1125F AMNT PAIN NOTED PAIN PRSNT: CPT | Mod: HCNC,S$GLB,, | Performed by: INTERNAL MEDICINE

## 2020-01-08 PROCEDURE — 99214 PR OFFICE/OUTPT VISIT, EST, LEVL IV, 30-39 MIN: ICD-10-PCS | Mod: HCNC,S$GLB,, | Performed by: INTERNAL MEDICINE

## 2020-01-08 PROCEDURE — 99999 PR PBB SHADOW E&M-EST. PATIENT-LVL V: ICD-10-PCS | Mod: PBBFAC,HCNC,, | Performed by: INTERNAL MEDICINE

## 2020-01-08 RX ORDER — DULOXETIN HYDROCHLORIDE 60 MG/1
60 CAPSULE, DELAYED RELEASE ORAL 2 TIMES DAILY
Qty: 180 CAPSULE | Refills: 3 | Status: SHIPPED | OUTPATIENT
Start: 2020-01-08 | End: 2020-05-15 | Stop reason: SDUPTHER

## 2020-01-08 RX ORDER — ALLOPURINOL 100 MG/1
100 TABLET ORAL DAILY
Qty: 90 TABLET | Refills: 3 | Status: SHIPPED | OUTPATIENT
Start: 2020-01-08 | End: 2020-11-13

## 2020-01-08 RX ORDER — PANTOPRAZOLE SODIUM 40 MG/1
TABLET, DELAYED RELEASE ORAL
Qty: 180 TABLET | Refills: 4 | Status: SHIPPED | OUTPATIENT
Start: 2020-01-08 | End: 2020-03-11 | Stop reason: SDUPTHER

## 2020-01-08 RX ORDER — POTASSIUM CHLORIDE 750 MG/1
10 TABLET, EXTENDED RELEASE ORAL 2 TIMES DAILY
Qty: 60 TABLET | Refills: 3 | Status: SHIPPED | OUTPATIENT
Start: 2020-01-08 | End: 2020-03-11

## 2020-01-08 RX ORDER — PROMETHAZINE HYDROCHLORIDE AND DEXTROMETHORPHAN HYDROBROMIDE 6.25; 15 MG/5ML; MG/5ML
SYRUP ORAL
Qty: 240 ML | Refills: 1 | Status: SHIPPED | OUTPATIENT
Start: 2020-01-08 | End: 2020-01-30

## 2020-01-08 RX ORDER — ROPINIROLE 0.5 MG/1
0.5 TABLET, FILM COATED ORAL NIGHTLY
Qty: 90 TABLET | Refills: 1 | Status: SHIPPED | OUTPATIENT
Start: 2020-01-08 | End: 2020-05-05

## 2020-01-08 RX ORDER — FUROSEMIDE 40 MG/1
TABLET ORAL
Qty: 180 TABLET | Refills: 4 | Status: SHIPPED | OUTPATIENT
Start: 2020-01-08 | End: 2020-10-16 | Stop reason: SDUPTHER

## 2020-01-08 RX ORDER — OXYCODONE AND ACETAMINOPHEN 10; 325 MG/1; MG/1
TABLET ORAL
Qty: 150 TABLET | Refills: 0 | Status: SHIPPED | OUTPATIENT
Start: 2020-01-08 | End: 2020-02-10 | Stop reason: SDUPTHER

## 2020-01-08 NOTE — PROGRESS NOTES
CHIEF COMPLAINT: Follow up of multiple issues     HISTORY OF PRESENT ILLNESS: This is a 79-year-old woman who presents for follow up of above     She reports that she passed out at 1:30 am on New Year's Day.  She was at a midnight celebration at her [employer].  She was sitting down and slid out of the chair.  She was lightheaded. She was having some vertigo. She went to the hospital the next day. CT head, cervical spine and CTA chest were fine.  Labs and EKG were fine.  She was given furosemide 20 mg IV and she diuresed some fluid. She had swelling in the legs. SHe continues to take furosemide 40 mg daily, coreg 25 mg twice daily,      NO syncope since then.   She is occasionally dizzy. She takes meclizine 12.5 mg at bedtime which helps the dizziness.       Left knee bothers her at times.  Right knee is fine now.      No chest pain, palpitations, shortness of breath.      She has been out of pantoprazole 40 mg twice daily for the last week.  Food is not getting stuck in her throat. Occasional nausea.  She continues to have intermittent abdominal cramping. No vomiting, constipation, diarrhea.  Occasional soft or watery stools.      She is not taking allopurinol 100 mg daily for gout.        She is now taking duloxetine 60 mg twice daily. Mood is doing better      She takes tizanidine 4 mg at bedtime for muscle spasm of her back. She continues to have back pain. She is taking Oxycodone apap 10/325 3-5 times daily which helps her back. She had an epidural with pain management on 9/17/19.  She is having some restless legs at night.      She is taking Lantus 60 units in the evening and Novolog 23 units once or twice daily with meals.  No hypoglycemia.  Averages 130-140. No hypoglycemia    She is out of potassium supplement and roprinole for restless leg (did help)     PAST MEDICAL HISTORY:   1. Diabetes mellitus   2. Hypertension   3. Hyperlipidemia   4. Left heart catheterization January 2007 which revealed luminal  "irregularities in the LAD, left circumflex and right coronary artery. She had diastolic dysfunction and patent renal arteries.   5. Sleep apnea   6. Obesity.   7. Nephrolithiasis status post lithotripsy.   8. Reflux - had nonerosive gastropathy on EGD September 2007. Gastritis on EGD 9/2010  9. Hidradenitis suppurativa.   10. History of diverticulitis with a hospitalized October 2007.   11. Migraines   12. Obesity.   13. 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    14. Total hysterectomy in 1969.   15. Cholecystectomy was done at that time as well.   16. 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   17. Colon polyps on colonscopy 11/2010 - due 2013   18. Hospitalization 12/10 for e coli sepsis due to left ureteral stone with left nephrostomy tube in [place], [place]   19. Left knee replacement 9/2012      MEDICATIONS and ALLERGIES: Updated on EPIC.     PHYSICAL EXAMINATION:   /80 (BP Location: Right arm, Patient Position: Sitting, BP Method: Large (Manual))   Pulse 71   Temp 97.8 °F (36.6 °C) (Oral)   Ht 5' 8" (1.727 m)   Wt 98.5 kg (217 lb 0.7 oz)   LMP  (LMP Unknown)   SpO2 98%   BMI 33.00 kg/m²      GENERAL: She is alert, oriented, no apparent distress. Affect within   normal limits.  Conjunctiva anicteric. . Oropharynx clear. TM clear fluid on right. NO TM on left  NECK: Supple.   Respiratory: Effort normal. Lungs clear  HEART: Regular rate and rhythm without murmurs, gallops or rubs.   No lower extremity edema.    ABDOMEN: soft, non distended, bowel sounds present, no hepatosplenomgaly. Left lower quadrant tenderness. No rebound or guarding           ASSESSMENT AND PLAN:   1.  Vertigo - stable on meclizine 12. 5 mg nightly  2. Gout - get on allopurinol 100 mg daily.   2.  Mood disorder - stable  duloxetine to 60 mg twice daily  3.  Neck, shoulder and back pain -follow up with [facility].  4. Asthma - stable   6. HTN " -stable  7. Gerd -on pantoprazole 40 mg twice daily -   8. Diabetes with neuropathy- watch sugars- labs  9. Hyperlipidemia - off lipitor  10. Allergic rhinitis - stable  11. Diastolic congestive heart failure - had passing out spell. Not sure if actual syncopal episode or fell asleep. Will get her a cardiology apt.     12. CRI -stable  13. Obesity - discussed diet, exercise and weight loss  14. CAD -risk factor modification  15. Aortic atherosclerosis and possible mesenteric artery stenosis- risk factor modification  16. Tortuous aorta - HTN controlled  17. Colon polyps - Colonoscopy in 8/2013 - 2 polyps. Flex sig 11/2013 - mild granular mucosa. Colonoscopy 6/2017 diverticulosis - no polyps due 2023.   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 .Restless leg syndrome -get on roprinole    .  MMG 5/19  Prevnar 10/15 and flu shot 11/16  I will see her back in 8 weeks sooner if issues. .

## 2020-01-09 ENCOUNTER — OFFICE VISIT (OUTPATIENT)
Dept: CARDIOLOGY | Facility: CLINIC | Age: 80
End: 2020-01-09
Payer: MEDICARE

## 2020-01-09 VITALS
OXYGEN SATURATION: 96 % | WEIGHT: 217.63 LBS | SYSTOLIC BLOOD PRESSURE: 193 MMHG | HEART RATE: 83 BPM | BODY MASS INDEX: 32.98 KG/M2 | HEIGHT: 68 IN | DIASTOLIC BLOOD PRESSURE: 87 MMHG

## 2020-01-09 DIAGNOSIS — I25.119 CORONARY ARTERY DISEASE INVOLVING NATIVE CORONARY ARTERY OF NATIVE HEART WITH ANGINA PECTORIS: Primary | ICD-10-CM

## 2020-01-09 DIAGNOSIS — E78.00 HYPERCHOLESTEROLEMIA: ICD-10-CM

## 2020-01-09 DIAGNOSIS — I50.32 CHRONIC DIASTOLIC CONGESTIVE HEART FAILURE: ICD-10-CM

## 2020-01-09 DIAGNOSIS — E11.42 TYPE 2 DIABETES MELLITUS WITH DIABETIC POLYNEUROPATHY, WITH LONG-TERM CURRENT USE OF INSULIN: ICD-10-CM

## 2020-01-09 DIAGNOSIS — R07.9 CHEST PAIN, UNSPECIFIED TYPE: ICD-10-CM

## 2020-01-09 DIAGNOSIS — I10 ESSENTIAL HYPERTENSION: Chronic | ICD-10-CM

## 2020-01-09 DIAGNOSIS — Z79.4 TYPE 2 DIABETES MELLITUS WITH DIABETIC POLYNEUROPATHY, WITH LONG-TERM CURRENT USE OF INSULIN: ICD-10-CM

## 2020-01-09 DIAGNOSIS — R55 SYNCOPE AND COLLAPSE: ICD-10-CM

## 2020-01-09 DIAGNOSIS — G47.33 OSA ON CPAP: Chronic | ICD-10-CM

## 2020-01-09 PROCEDURE — 3077F SYST BP >= 140 MM HG: CPT | Mod: HCNC,CPTII,S$GLB, | Performed by: INTERNAL MEDICINE

## 2020-01-09 PROCEDURE — 1159F PR MEDICATION LIST DOCUMENTED IN MEDICAL RECORD: ICD-10-PCS | Mod: HCNC,S$GLB,, | Performed by: INTERNAL MEDICINE

## 2020-01-09 PROCEDURE — 99204 PR OFFICE/OUTPT VISIT, NEW, LEVL IV, 45-59 MIN: ICD-10-PCS | Mod: HCNC,S$GLB,, | Performed by: INTERNAL MEDICINE

## 2020-01-09 PROCEDURE — 99999 PR PBB SHADOW E&M-EST. PATIENT-LVL IV: ICD-10-PCS | Mod: PBBFAC,HCNC,, | Performed by: INTERNAL MEDICINE

## 2020-01-09 PROCEDURE — 3077F PR MOST RECENT SYSTOLIC BLOOD PRESSURE >= 140 MM HG: ICD-10-PCS | Mod: HCNC,CPTII,S$GLB, | Performed by: INTERNAL MEDICINE

## 2020-01-09 PROCEDURE — 3079F PR MOST RECENT DIASTOLIC BLOOD PRESSURE 80-89 MM HG: ICD-10-PCS | Mod: HCNC,CPTII,S$GLB, | Performed by: INTERNAL MEDICINE

## 2020-01-09 PROCEDURE — 99499 UNLISTED E&M SERVICE: CPT | Mod: HCNC,S$GLB,, | Performed by: INTERNAL MEDICINE

## 2020-01-09 PROCEDURE — 1159F MED LIST DOCD IN RCRD: CPT | Mod: HCNC,S$GLB,, | Performed by: INTERNAL MEDICINE

## 2020-01-09 PROCEDURE — 1126F AMNT PAIN NOTED NONE PRSNT: CPT | Mod: HCNC,S$GLB,, | Performed by: INTERNAL MEDICINE

## 2020-01-09 PROCEDURE — 99999 PR PBB SHADOW E&M-EST. PATIENT-LVL IV: CPT | Mod: PBBFAC,HCNC,, | Performed by: INTERNAL MEDICINE

## 2020-01-09 PROCEDURE — 99499 RISK ADDL DX/OHS AUDIT: ICD-10-PCS | Mod: HCNC,S$GLB,, | Performed by: INTERNAL MEDICINE

## 2020-01-09 PROCEDURE — 3079F DIAST BP 80-89 MM HG: CPT | Mod: HCNC,CPTII,S$GLB, | Performed by: INTERNAL MEDICINE

## 2020-01-09 PROCEDURE — 1101F PR PT FALLS ASSESS DOC 0-1 FALLS W/OUT INJ PAST YR: ICD-10-PCS | Mod: HCNC,CPTII,S$GLB, | Performed by: INTERNAL MEDICINE

## 2020-01-09 PROCEDURE — 1101F PT FALLS ASSESS-DOCD LE1/YR: CPT | Mod: HCNC,CPTII,S$GLB, | Performed by: INTERNAL MEDICINE

## 2020-01-09 PROCEDURE — 1126F PR PAIN SEVERITY QUANTIFIED, NO PAIN PRESENT: ICD-10-PCS | Mod: HCNC,S$GLB,, | Performed by: INTERNAL MEDICINE

## 2020-01-09 PROCEDURE — 99204 OFFICE O/P NEW MOD 45 MIN: CPT | Mod: HCNC,S$GLB,, | Performed by: INTERNAL MEDICINE

## 2020-01-09 RX ORDER — AMLODIPINE BESYLATE 5 MG/1
5 TABLET ORAL DAILY
Qty: 90 TABLET | Refills: 3 | Status: SHIPPED | OUTPATIENT
Start: 2020-01-09 | End: 2020-11-13

## 2020-01-09 RX ORDER — ATORVASTATIN CALCIUM 40 MG/1
40 TABLET, FILM COATED ORAL DAILY
Qty: 90 TABLET | Refills: 3 | Status: SHIPPED | OUTPATIENT
Start: 2020-01-09 | End: 2020-11-11

## 2020-01-09 NOTE — LETTER
January 9, 2020      Jeannine Arriaga MD  [address]  [place] [address]           [facility] - Cardiology  [address]  [place] [address]  Phone: [phone]          Patient: Henrietta Villasenor   MR Number: [id number]   YOB: 1940   Date of Visit: 1/9/2020       Dear Dr. Jeannine Arriaga:    Thank you for referring Henrietta Villasenor to me for evaluation. Attached you will find relevant portions of my assessment and plan of care.    If you have questions, please do not hesitate to call me. I look forward to following Henrietta Villasenor along with you.    Sincerely,    Giovana Harper MD    Enclosure  CC:  No Recipients    If you would like to receive this communication electronically, please contact [email or web address] or [phone] to request more information on [facility] access.    For providers and/or their staff who would like to refer a patient to [facility], please contact us through our one-stop-shop provider referral line, [facility], at [phone].    If you feel you have received this communication in error or would no longer like to receive these types of communications, please e-mail [email or web address]

## 2020-01-09 NOTE — PATIENT INSTRUCTIONS
Take an extra dose of lasix for weight gain of 3 or more lbs in 24 hours or 5 lbs in one week.    Limit sodium intake to < 1500mg daily.    Start atorvastatin 40 mg daily for cholesterol.    Start amlodipine 5 mg daily for blood pressure.

## 2020-01-09 NOTE — PROGRESS NOTES
Subjective:   Patient ID:  Henrietta Villasenor is a 79 y.o. female who presents for evaluation of Diastolic congestive heart failure, unspecified HF chronicit; Dizziness; Shortness of Breath; and Leg Swelling      HPI: She is referred by Dr. Arriaga for evaluation of a possible syncopal event. She was at [employer] on [place] and began feeling very confused. She did not know where she was or where she parked her car. She was told she was sitting in a chair and kept sliding down. She does not know if she actually lost consciousness. She remembers things spinning and seeing bright lights. She reports eating some toast and coffee that morning, but did not check her sugar. She went to the ED the following day and had an unremarkable work up with the exception of significant HTN. She had a vertigo episode in the past. She has not had any recurrence. Her blood pressure and cholesterol have been uncontrolled. She reports exertional chest tightness and dyspnea. She had a negative PET stress in 2013.    ECG (1/2/20); NSR 74     Past Medical History:   Diagnosis Date    Abnormality of lung 11/08/2011    Stable bandlike opacities at the lung bases, most likely representing      Anxiety     Arthritis     CAD (coronary artery disease)     Calculus of ureter 2/22/2011    CHF (congestive heart failure)     Chronic back pain 7/29/2012    Colon polyp 9/2010; 11/2010    Colon polyps 7/29/2012    Coronary artery disease involving native coronary artery of native heart with angina pectoris     Depression     Diabetes mellitus     Diabetes mellitus type II     Diverticulitis large intestine 7/27/2015    Diverticulitis of large intestine without perforation or abscess without bleeding     Diverticulosis 09/25/2010; 11/02/2010; 11/08/2011; 7/29/2012    Duodenal disorder 08/25/2011    Duodenal erosion noted on EGD.    Duodenal ulcer, unspecified as acute or chronic, without hemorrhage, perforation, or obstruction 8/24/2011     E. coli sepsis 12/2010    Due to left ureteral stone with left nephrostomy tube - hospitalized in [place]    Esophageal dysmotility 01/24/2012    Noted on upper GI-barium swallow.    Facial weakness 1969    Left facial weakness s/p left mastoidectomy in ~ 1969.    Fatty liver 11/08/2011    Reported on CT-abdomen and in 06/2012 Gastro clinic visit note.    Gastric polyp 09/29/2010    GERD (gastroesophageal reflux disease) 7/29/2012    Hepatomegaly 11/08/2011    Reported on CT-abdomen    Herpes zoster with other nervous system complications(053.19) 2/28/2011    Hiatal hernia 06/26/2006; 09/29/2010; 08/25/2011    Noted on barium swallow 2006; noted on  EGD 2011.   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    Trouble in sleeping     Type II or unspecified type diabetes mellitus with neurological manifestations, not stated as uncontrolled(250.60)     Type II or unspecified type diabetes mellitus with peripheral circulatory disorders, not stated as uncontrolled(250.70)     Type II or unspecified type diabetes mellitus with renal manifestations, not stated as uncontrolled(250.40)        Past Surgical History:   Procedure Laterality Date    BELPHAROPTOSIS REPAIR      s/p LAMBERTO levator repair - Dr. Dejesus     CARDIAC CATHETERIZATION      CARPAL TUNNEL RELEASE  3/13/2012    CATARACT EXTRACTION W/  INTRAOCULAR LENS IMPLANT Right 10/31/2018        CATARACT EXTRACTION W/  INTRAOCULAR LENS IMPLANT Left 03/22/2018        CHOLECYSTECTOMY      COLONOSCOPY N/A 11/16/2016    Procedure: COLONOSCOPY;  Surgeon: Chris Storey MD;  Location: [facility] (4TH FLR);  Service: Endoscopy;  Laterality: N/A;    COLONOSCOPY N/A 6/14/2017    Procedure: COLONOSCOPY;  Surgeon: Chris Storey MD;  Location: [facility] ENDO (4TH FLR);  Service: Endoscopy;  Laterality: N/A;  colonoscopy in 3 months with better bowel prep. - Split PEG prep ordered    EXTRACORPOREAL SHOCK WAVE LITHOTRIPSY      INTRAOCULAR PROSTHESES INSERTION Right 10/31/2018    Procedure: INSERTION-INTRAOCULAR LENS (IOL);  Surgeon: Keysha Valle MD;  Location: [facility] OR;  Service: Ophthalmology;  Laterality: Right;    JOINT REPLACEMENT      mastoid tumor removal  1969    Left mastoidectomy with residual left facial weakness.    NEPHROSTOMY      OOPHORECTOMY      PHACOEMULSIFICATION OF CATARACT Right 10/31/2018    Procedure: PHACOEMULSIFICATION-ASPIRATION-CATARACT;  Surgeon: Keysha Valle MD;  Location: [facility] OR;  Service: Ophthalmology;  Laterality: Right;    TOTAL ABDOMINAL HYSTERECTOMY  1969    TAHBSO    TOTAL KNEE ARTHROPLASTY  9/13/2012    Left    TRANSFORAMINAL EPIDURAL INJECTION OF STEROID Bilateral 9/17/2019    Procedure: INJECTION, STEROID, EPIDURAL, TRANSFORAMINAL APPROACH;  Surgeon: Oscar Ravi MD;  Location: [facility] PAIN MGT;  Service: Pain Management;  Laterality: Bilateral;  B/L TFESI L4/L5       Social History     Socioeconomic History    Marital status: Single     Spouse name: Not on file    Number of children: 5    Years of education: Not on file    Highest education level: Not on file   Occupational History    Not on file   Social Needs    Financial resource strain: Not on file    Food insecurity:     Worry: Not on  file     Inability: Not on file    Transportation needs:     Medical: Not on file     Non-medical: Not on file   Tobacco Use    Smoking status: Former Smoker     Packs/day: 1.00     Years: 15.00     Pack years: 15.00     Types: Cigarettes     Last attempt to quit: 2000     Years since quittin.4    Smokeless tobacco: Never Used   Substance and Sexual Activity    Alcohol use: No    Drug use: No    Sexual activity: Never   Lifestyle    Physical activity:     Days per week: Not on file     Minutes per session: Not on file    Stress: Not on file   Relationships    Social connections:     Talks on phone: Not on file     Gets together: Not on file     Attends [employer] service: Not on file     Active member of club or organization: Not on file     Attends meetings of clubs or organizations: Not on file     Relationship status: Not on file   Other Topics Concern    Are you pregnant or think you may be? Not Asked    Breast-feeding Not Asked   Social History Narrative    Single with 5 children. Lives alone. Retired cook.       Family History   Problem Relation Age of Onset    Sleep apnea Sister     Diabetes Sister     Cancer Mother         brain tumor    Hypertension Mother     Heart disease Father     Heart attack Father     Dementia Brother     Lupus Brother     Frontotemporal dementia Brother     No Known Problems Daughter     No Known Problems Son     Diabetes Sister     Lupus Sister     Breast cancer Sister     Diabetes Sister     Cancer Sister         breast cancer    Heart attack Sister     Diabetes Sister     Liver cancer Brother     Drug abuse Brother     Alcohol abuse Brother     Cirrhosis Brother     Drug abuse Brother     No Known Problems Daughter     No Known Problems Son     No Known Problems Son     No Known Problems Maternal Grandmother     No Known Problems Maternal Grandfather     No Known Problems Paternal Grandmother     No Known Problems Paternal  "Grandfather     Diabetes Brother     Dementia Brother     Diabetes Other     Ovarian cancer Other         Niece     Breast cancer Maternal Aunt     No Known Problems Maternal Uncle     No Known Problems Paternal Aunt     No Known Problems Paternal Uncle     Glaucoma Neg Hx     Blindness Neg Hx     Celiac disease Neg Hx     Colon cancer Neg Hx     Colon polyps Neg Hx     Esophageal cancer Neg Hx     Inflammatory bowel disease Neg Hx     Liver disease Neg Hx     Rectal cancer Neg Hx     Stomach cancer Neg Hx     Ulcerative colitis Neg Hx     Melanoma Neg Hx     Multiple sclerosis Neg Hx     Psoriasis Neg Hx     Amblyopia Neg Hx     Cataracts Neg Hx     Macular degeneration Neg Hx     Retinal detachment Neg Hx     Strabismus Neg Hx     Stroke Neg Hx     Thyroid disease Neg Hx        Patient's Medications   New Prescriptions    AMLODIPINE (NORVASC) 5 MG TABLET    Take 1 tablet (5 mg total) by mouth once daily.    ATORVASTATIN (LIPITOR) 40 MG TABLET    Take 1 tablet (40 mg total) by mouth once daily.   Previous Medications    ACCU-CHEK FASTCLIX MISC        ACCU-CHEK SMARTVIEW TEST STRIP STRP    TEST THREE TIMES DAILY    ALBUTEROL (VENTOLIN HFA) 90 MCG/ACTUATION INHALER    Inhale 2 puffs by mouth into the lungs every 4 (four) hours as needed for Wheezing. Rescue    ALCOHOL ANTISEPTIC PADS (ALCOHOL PREP PADS TOP)        ALLOPURINOL (ZYLOPRIM) 100 MG TABLET    Take 1 tablet (100 mg total) by mouth once daily.    ASPIRIN (ECOTRIN) 81 MG EC TABLET    Take 1 tablet by mouth Daily.    AZELASTINE (ASTELIN) 137 MCG (0.1 %) NASAL SPRAY    Spray 1 spray (137 mcg total) by Nasal route 2 (two) times daily.    BD ALCOHOL SWABS PADM    USE  WHEN  TESTING BLOOD SUGAR FOUR TIMES DAILY    BD ULTRA-FINE OLU PEN NEEDLES 32 X 5/32 " NDLE    Uses 4 times daily, on multiple daily insulin injections    CARBAMIDE PEROXIDE (EAR WAX REMOVAL DROPS OTIC)        CARVEDILOL (COREG) 25 MG TABLET    TAKE 1 TABLET TWICE " "DAILY    CHOLECALCIFEROL, VITAMIN D3, (DIALYVITE VITAMIN D) 5,000 UNIT CAPSULE    Take 5,000 Units by mouth once daily.    CHOLECALCIFEROL, VITAMIN D3, (VITAMIN D3) 400 UNIT CAP    Take 400 Units by mouth once daily.    DULOXETINE (CYMBALTA) 60 MG CAPSULE    Take 1 capsule (60 mg total) by mouth 2 (two) times daily.    FLUTICASONE (FLONASE) 50 MCG/ACTUATION NASAL SPRAY    SHAKE LQ AND U 2 SPRAYS IEN QD    FUROSEMIDE (LASIX) 40 MG TABLET    TAKE 1 TABLET BY MOUTH TWICE DAILY    INSULIN (LANTUS SOLOSTAR U-100 INSULIN) GLARGINE 100 UNITS/ML (3ML) SUBQ PEN    Inject 55-60 units at night.    INSULIN ASPART (NOVOLOG) 100 UNIT/ML INPN PEN    23 units with meals plus correction scale. Max daily dose=120 units    LACTOBACILLUS ACIDOPHILUS (PROBIOTIC) 10 BILLION CELL CAP    Take 1 capsule by mouth once daily.    LORATADINE (CLARITIN) 10 MG TABLET    Take 1 tablet (10 mg total) by mouth once daily.    MECLIZINE (ANTIVERT) 12.5 MG TABLET    Take 1 tablet (12.5 mg total) by mouth 3 (three) times daily as needed for Dizziness.    MUSCLE RUB, WITH CAMPHOR, 4-30-10 % CREA        NITROGLYCERIN (NITROSTAT) 0.4 MG SL TABLET    Place 1 tablet (0.4 mg total) under the tongue every 5 (five) minutes as needed.    OLOPATADINE (PATADAY) 0.2 % DROP    Place 1 drop into both eyes once daily.    ONDANSETRON (ZOFRAN) 4 MG TABLET    Take 1 tablet (4 mg total) by mouth every 8 (eight) hours as needed for Nausea.    OXYCODONE-ACETAMINOPHEN (PERCOCET)  MG PER TABLET    Take one tablet by mouth 5 times daily    PANTOPRAZOLE (PROTONIX) 40 MG TABLET    TAKE 1 TABLET(40 MG) BY MOUTH twice daily    PEN NEEDLE, DIABETIC (SURE-FINE PEN NEEDLES) 29 GAUGE X 1/2" NDLE    Use 4 times daily    POTASSIUM CHLORIDE SA (K-DUR,KLOR-CON) 10 MEQ TABLET    Take 1 tablet (10 mEq total) by mouth 2 (two) times daily.    PROMETHAZINE-DEXTROMETHORPHAN (PROMETHAZINE-DM) 6.25-15 MG/5 ML SYRP    Take 2 teaspoonsful by mouth every 8 hours as needed for cough    " "ROPINIROLE (REQUIP) 0.5 MG TABLET    Take 1 tablet (0.5 mg total) by mouth every evening.    TIZANIDINE (ZANAFLEX) 4 MG TABLET    Take 0.5-1 tablets (2-4 mg total) by mouth every 8 (eight) hours.    TRIAMCINOLONE ACETONIDE 0.1% (KENALOG) 0.1 % CREAM    Apply topically 3 (three) times daily.    TRUEPLUS LANCETS 33 GAUGE MISC    TEST BLOOD SUGAR FOUR TIMES DAILY   Modified Medications    No medications on file   Discontinued Medications    No medications on file       Review of Systems   Constitution: Negative for malaise/fatigue and weight gain.   HENT: Negative for hearing loss.    Eyes: Negative for visual disturbance.   Cardiovascular: Positive for chest pain, dyspnea on exertion and near-syncope. Negative for claudication, leg swelling, orthopnea, palpitations, paroxysmal nocturnal dyspnea and syncope.   Respiratory: Negative for cough, shortness of breath, sleep disturbances due to breathing, snoring and wheezing.    Endocrine: Negative for cold intolerance, heat intolerance, polydipsia, polyphagia and polyuria.   Hematologic/Lymphatic: Negative for bleeding problem. Does not bruise/bleed easily.   Skin: Negative for rash and suspicious lesions.   Musculoskeletal: Negative for arthritis, falls, joint pain, muscle weakness and myalgias.   Gastrointestinal: Negative for abdominal pain, change in bowel habit, constipation, diarrhea, heartburn, hematochezia, melena and nausea.   Genitourinary: Negative for hematuria and nocturia.   Neurological: Negative for excessive daytime sleepiness, dizziness, headaches, light-headedness, loss of balance and weakness.   Psychiatric/Behavioral: Negative for depression. The patient is not nervous/anxious.    Allergic/Immunologic: Negative for environmental allergies.       BP (!) 193/87 (BP Location: Left arm, Patient Position: Sitting, BP Method: Large (Manual))   Pulse 83   Ht 5' 8" (1.727 m)   Wt 98.7 kg (217 lb 9.5 oz)   LMP  (LMP Unknown)   SpO2 96%   BMI 33.09 kg/m² "     Objective:   Physical Exam   Constitutional: She is oriented to person, place, and time. She appears well-developed and well-nourished.        HENT:   Head: Normocephalic and atraumatic.   Mouth/Throat: Oropharynx is clear and moist.   Eyes: Pupils are equal, round, and reactive to light. Conjunctivae and EOM are normal. No scleral icterus.   Neck: Normal range of motion. Neck supple. No hepatojugular reflux and no JVD present. No tracheal deviation present. No thyromegaly present.   Cardiovascular: Normal rate, regular rhythm, normal heart sounds and intact distal pulses. PMI is not displaced.   Pulses:       Carotid pulses are 2+ on the right side, and 2+ on the left side.       Radial pulses are 2+ on the right side, and 2+ on the left side.        Dorsalis pedis pulses are 2+ on the right side, and 2+ on the left side.        Posterior tibial pulses are 2+ on the right side, and 2+ on the left side.   Pulmonary/Chest: Effort normal and breath sounds normal.   Abdominal: Soft. Bowel sounds are normal. She exhibits no distension and no mass. There is no hepatosplenomegaly. There is no tenderness.   Musculoskeletal: She exhibits no edema or tenderness.   Lymphadenopathy:     She has no cervical adenopathy.   Neurological: She is alert and oriented to person, place, and time. A cranial nerve deficit is present.   Left facial paralysis   Skin: Skin is warm and dry. No rash noted. No cyanosis or erythema. Nails show no clubbing.   Psychiatric: She has a normal mood and affect. Her speech is normal and behavior is normal.       Lab Results   Component Value Date     01/02/2020    K 3.7 01/02/2020     01/02/2020    CO2 28 01/02/2020    BUN 13 01/02/2020    CREATININE 1.4 01/02/2020     (H) 01/02/2020    HGBA1C 9.0 (H) 11/18/2019    MG 1.6 01/19/2018    AST 18 01/02/2020    ALT 10 01/02/2020    ALBUMIN 3.2 (L) 01/02/2020    PROT 7.6 01/02/2020    BILITOT 0.4 01/02/2020    WBC 9.90 01/02/2020    HGB  12.4 01/02/2020    HCT 39.7 01/02/2020    MCV 90 01/02/2020     01/02/2020    INR 1.1 12/02/2016    TSH 1.195 11/18/2019    CHOL 269 (H) 11/18/2019    HDL 52 11/18/2019    LDLCALC 193.4 (H) 11/18/2019    TRIG 118 11/18/2019     (H) 01/02/2020       Assessment:     1. Coronary artery disease involving native coronary artery of native heart with angina pectoris    2. Essential hypertension : Her blood pressure is uncontrolled. I have added amlodipine 5 mg daily and enrolled her in Digital HTN Program. Limit sodium intake to < 1500mg daily and follow the DASH diet plan.   3. Hypercholesterolemia : LDL > 190. Start atorvastatin 40 mg daily. Follow up labs in 6 weeks.   4. Chronic diastolic congestive heart failure    5. Type 2 diabetes mellitus with diabetic polyneuropathy, with long-term current use of insulin    6. MARLON on CPAP : Not wearing routinely.   7. Chest pain, unspecified type : DSE ordered.   8. Syncope and collapse : 30 Day event monitor ordered.       Plan:     Henrietta was seen today for diastolic congestive heart failure, unspecified hf chronicit, dizziness, shortness of breath and leg swelling.    Diagnoses and all orders for this visit:    Coronary artery disease involving native coronary artery of native heart with angina pectoris  -     Cardiac event monitor; Future  -     Stress Echo Which stress agent will be used? Pharm; Future  -     Lipid panel; Future  -     Hepatic function panel; Future  -     amLODIPine (NORVASC) 5 MG tablet; Take 1 tablet (5 mg total) by mouth once daily.  -     atorvastatin (LIPITOR) 40 MG tablet; Take 1 tablet (40 mg total) by mouth once daily.    Essential hypertension  -     NURSING COMMUNICATION: Create [facility] Account  -     Hypertension [place] [facility] (HDMP) Enrollment Order  -     Hypertension Digital Medicine (HDMP): Assign Onboarding Questionnaires  -     Cardiac event monitor; Future  -     Stress Echo Which stress agent will be used? Pharm;  Future  -     Lipid panel; Future  -     Hepatic function panel; Future  -     amLODIPine (NORVASC) 5 MG tablet; Take 1 tablet (5 mg total) by mouth once daily.  -     atorvastatin (LIPITOR) 40 MG tablet; Take 1 tablet (40 mg total) by mouth once daily.    Hypercholesterolemia  -     Cardiac event monitor; Future  -     Stress Echo Which stress agent will be used? Pharm; Future  -     Lipid panel; Future  -     Hepatic function panel; Future  -     amLODIPine (NORVASC) 5 MG tablet; Take 1 tablet (5 mg total) by mouth once daily.  -     atorvastatin (LIPITOR) 40 MG tablet; Take 1 tablet (40 mg total) by mouth once daily.    Chronic diastolic congestive heart failure  -     Cardiac event monitor; Future  -     Stress Echo Which stress agent will be used? Pharm; Future  -     Lipid panel; Future  -     Hepatic function panel; Future  -     amLODIPine (NORVASC) 5 MG tablet; Take 1 tablet (5 mg total) by mouth once daily.    Type 2 diabetes mellitus with diabetic polyneuropathy, with long-term current use of insulin  -     Cardiac event monitor; Future  -     Stress Echo Which stress agent will be used? Pharm; Future  -     Lipid panel; Future  -     Hepatic function panel; Future  -     amLODIPine (NORVASC) 5 MG tablet; Take 1 tablet (5 mg total) by mouth once daily.  -     atorvastatin (LIPITOR) 40 MG tablet; Take 1 tablet (40 mg total) by mouth once daily.    MARLON on CPAP  -     Cardiac event monitor; Future  -     Stress Echo Which stress agent will be used? Pharm; Future  -     Lipid panel; Future  -     Hepatic function panel; Future  -     amLODIPine (NORVASC) 5 MG tablet; Take 1 tablet (5 mg total) by mouth once daily.    Chest pain, unspecified type  -     Cardiac event monitor; Future  -     Stress Echo Which stress agent will be used? Pharm; Future  -     Lipid panel; Future  -     Hepatic function panel; Future  -     amLODIPine (NORVASC) 5 MG tablet; Take 1 tablet (5 mg total) by mouth once daily.  -      atorvastatin (LIPITOR) 40 MG tablet; Take 1 tablet (40 mg total) by mouth once daily.    Syncope and collapse  -     Cardiac event monitor; Future  -     Stress Echo Which stress agent will be used? Pharm; Future  -     Lipid panel; Future  -     Hepatic function panel; Future  -     amLODIPine (NORVASC) 5 MG tablet; Take 1 tablet (5 mg total) by mouth once daily.  -     atorvastatin (LIPITOR) 40 MG tablet; Take 1 tablet (40 mg total) by mouth once daily.        Thank you for allowing me to participate in this patient's care. Please do not hesitate to contact me with any questions or concerns.

## 2020-01-16 ENCOUNTER — TELEPHONE (OUTPATIENT)
Dept: CARDIOLOGY | Facility: CLINIC | Age: 80
End: 2020-01-16

## 2020-01-16 ENCOUNTER — CLINICAL SUPPORT (OUTPATIENT)
Dept: CARDIOLOGY | Facility: HOSPITAL | Age: 80
End: 2020-01-16
Attending: INTERNAL MEDICINE
Payer: MEDICARE

## 2020-01-16 ENCOUNTER — HOSPITAL ENCOUNTER (OUTPATIENT)
Dept: CARDIOLOGY | Facility: CLINIC | Age: 80
Discharge: HOME | End: 2020-01-16
Attending: INTERNAL MEDICINE
Payer: MEDICARE

## 2020-01-16 VITALS
WEIGHT: 217 LBS | DIASTOLIC BLOOD PRESSURE: 87 MMHG | HEART RATE: 73 BPM | SYSTOLIC BLOOD PRESSURE: 150 MMHG | BODY MASS INDEX: 32.89 KG/M2 | RESPIRATION RATE: 20 BRPM | HEIGHT: 68 IN

## 2020-01-16 DIAGNOSIS — E78.00 HYPERCHOLESTEROLEMIA: ICD-10-CM

## 2020-01-16 DIAGNOSIS — I10 ESSENTIAL HYPERTENSION: ICD-10-CM

## 2020-01-16 DIAGNOSIS — R07.9 CHEST PAIN, UNSPECIFIED TYPE: ICD-10-CM

## 2020-01-16 DIAGNOSIS — E11.42 TYPE 2 DIABETES MELLITUS WITH DIABETIC POLYNEUROPATHY, WITH LONG-TERM CURRENT USE OF INSULIN: ICD-10-CM

## 2020-01-16 DIAGNOSIS — I25.119 CORONARY ARTERY DISEASE INVOLVING NATIVE CORONARY ARTERY OF NATIVE HEART WITH ANGINA PECTORIS: ICD-10-CM

## 2020-01-16 DIAGNOSIS — Z79.4 TYPE 2 DIABETES MELLITUS WITH DIABETIC POLYNEUROPATHY, WITH LONG-TERM CURRENT USE OF INSULIN: ICD-10-CM

## 2020-01-16 DIAGNOSIS — G47.33 OSA ON CPAP: ICD-10-CM

## 2020-01-16 DIAGNOSIS — I50.32 CHRONIC DIASTOLIC CONGESTIVE HEART FAILURE: ICD-10-CM

## 2020-01-16 DIAGNOSIS — R55 SYNCOPE AND COLLAPSE: ICD-10-CM

## 2020-01-16 LAB
ASCENDING AORTA: 2.48 CM
BSA FOR ECHO PROCEDURE: 2.17 M2
CV ECHO LV RWT: 0.48 CM
CV STRESS BASE HR: 72 BPM
DIASTOLIC BLOOD PRESSURE: 87 MMHG
DOP CALC LVOT AREA: 3 CM2
DOP CALC LVOT DIAMETER: 1.97 CM
DOP CALC LVOT PEAK VEL: 0.8 M/S
DOP CALC LVOT STROKE VOLUME: 39.15 CM3
DOP CALCLVOT PEAK VEL VTI: 12.85 CM
E WAVE DECELERATION TIME: 133.01 MSEC
E/A RATIO: 0.45
E/E' RATIO: 15 M/S
ECHO LV POSTERIOR WALL: 1 CM (ref 0.6–1.1)
FRACTIONAL SHORTENING: 39 % (ref 28–44)
INTERVENTRICULAR SEPTUM: 1.1 CM (ref 0.6–1.1)
IVRT: 0.08 MSEC
LA MAJOR: 5.07 CM
LA MINOR: 5.14 CM
LA WIDTH: 4.68 CM
LEFT ATRIUM SIZE: 2.93 CM
LEFT ATRIUM VOLUME INDEX: 28.1 ML/M2
LEFT ATRIUM VOLUME: 59.5 CM3
LEFT INTERNAL DIMENSION IN SYSTOLE: 2.53 CM (ref 2.1–4)
LEFT VENTRICLE DIASTOLIC VOLUME INDEX: 35.9 ML/M2
LEFT VENTRICLE DIASTOLIC VOLUME: 75.97 ML
LEFT VENTRICLE MASS INDEX: 68 G/M2
LEFT VENTRICLE SYSTOLIC VOLUME INDEX: 10.9 ML/M2
LEFT VENTRICLE SYSTOLIC VOLUME: 22.98 ML
LEFT VENTRICULAR INTERNAL DIMENSION IN DIASTOLE: 4.14 CM (ref 3.5–6)
LEFT VENTRICULAR MASS: 143.71 G
LV LATERAL E/E' RATIO: 11.25 M/S
LV SEPTAL E/E' RATIO: 22.5 M/S
MV PEAK A VEL: 1.01 M/S
MV PEAK E VEL: 0.45 M/S
OHS CV CPX 1 MINUTE RECOVERY HEART RATE: 102 BPM
OHS CV CPX 85 PERCENT MAX PREDICTED HEART RATE MALE: 116
OHS CV CPX MAX PREDICTED HEART RATE: 136
OHS CV CPX PATIENT IS FEMALE: 1
OHS CV CPX PATIENT IS MALE: 0
OHS CV CPX PEAK DIASTOLIC BLOOD PRESSURE: 66 MMHG
OHS CV CPX PEAK HEAR RATE: 102 BPM
OHS CV CPX PEAK RATE PRESSURE PRODUCT: NORMAL
OHS CV CPX PEAK SYSTOLIC BLOOD PRESSURE: 150 MMHG
OHS CV CPX PERCENT MAX PREDICTED HEART RATE ACHIEVED: 75
OHS CV CPX RATE PRESSURE PRODUCT PRESENTING: NORMAL
PISA TR MAX VEL: 2.41 M/S
RA MAJOR: 3.45 CM
RA PRESSURE: 3 MMHG
RA WIDTH: 3.02 CM
RIGHT VENTRICULAR END-DIASTOLIC DIMENSION: 2.99 CM
RV TISSUE DOPPLER FREE WALL SYSTOLIC VELOCITY 1 (APICAL 4 CHAMBER VIEW): 7.47 CM/S
SINUS: 2.62 CM
STJ: 2.02 CM
SYSTOLIC BLOOD PRESSURE: 150 MMHG
TDI LATERAL: 0.04 M/S
TDI SEPTAL: 0.02 M/S
TDI: 0.03 M/S
TR MAX PG: 23 MMHG
TRICUSPID ANNULAR PLANE SYSTOLIC EXCURSION: 1.62 CM
TV REST PULMONARY ARTERY PRESSURE: 26 MMHG

## 2020-01-16 PROCEDURE — 93320 STRESS ECHO (CUPID ONLY): ICD-10-PCS | Mod: HCNC,S$GLB,, | Performed by: INTERNAL MEDICINE

## 2020-01-16 PROCEDURE — 93351 STRESS ECHO (CUPID ONLY): ICD-10-PCS | Mod: HCNC,S$GLB,, | Performed by: INTERNAL MEDICINE

## 2020-01-16 PROCEDURE — 93351 STRESS TTE COMPLETE: CPT | Mod: HCNC,S$GLB,, | Performed by: INTERNAL MEDICINE

## 2020-01-16 PROCEDURE — 93320 DOPPLER ECHO COMPLETE: CPT | Mod: HCNC,S$GLB,, | Performed by: INTERNAL MEDICINE

## 2020-01-16 PROCEDURE — 93352 STRESS ECHO (CUPID ONLY): ICD-10-PCS | Mod: HCNC,S$GLB,, | Performed by: INTERNAL MEDICINE

## 2020-01-16 PROCEDURE — 93272 CARDIAC EVENT MONITOR (CUPID ONLY): ICD-10-PCS | Mod: HCNC,,, | Performed by: INTERNAL MEDICINE

## 2020-01-16 PROCEDURE — 93271 ECG/MONITORING AND ANALYSIS: CPT | Mod: HCNC

## 2020-01-16 PROCEDURE — 93272 ECG/REVIEW INTERPRET ONLY: CPT | Mod: HCNC,,, | Performed by: INTERNAL MEDICINE

## 2020-01-16 PROCEDURE — 93325 DOPPLER ECHO COLOR FLOW MAPG: CPT | Mod: HCNC,S$GLB,, | Performed by: INTERNAL MEDICINE

## 2020-01-16 PROCEDURE — 93325 STRESS ECHO (CUPID ONLY): ICD-10-PCS | Mod: HCNC,S$GLB,, | Performed by: INTERNAL MEDICINE

## 2020-01-16 PROCEDURE — 93352 ADMIN ECG CONTRAST AGENT: CPT | Mod: HCNC,S$GLB,, | Performed by: INTERNAL MEDICINE

## 2020-01-16 RX ORDER — DOBUTAMINE HYDROCHLORIDE 200 MG/100ML
60 INJECTION INTRAVENOUS
Status: COMPLETED | OUTPATIENT
Start: 2020-01-16 | End: 2020-01-16

## 2020-01-16 RX ORDER — ATROPINE SULFATE 0.1 MG/ML
2 INJECTION INTRAVENOUS
Status: COMPLETED | OUTPATIENT
Start: 2020-01-16 | End: 2020-01-16

## 2020-01-16 RX ADMIN — ATROPINE SULFATE 2 MG: 0.1 INJECTION INTRAVENOUS at 11:01

## 2020-01-16 RX ADMIN — DOBUTAMINE HYDROCHLORIDE 60 MCG/KG/MIN: 200 INJECTION INTRAVENOUS at 11:01

## 2020-01-16 NOTE — NURSING NOTE
Patient verified by 2 identifiers and allergies reviewed.  22g IV placed to Lt AC.  DSE explained to patient, consent obtained & testing completed.  Pt tolerated testing well. IV removed post testing.  Post study discharge instructions reviewed with patient, patient verbalized understanding.  Patient discharged to home in stable condition.

## 2020-01-16 NOTE — TELEPHONE ENCOUNTER
----- Message from Giovana Harper MD sent at 1/16/2020  2:41 PM CST -----  The stress test was normal. The heart function is strong and there was no evidence of any significant blockages in the coronary arteries.

## 2020-01-20 RX ORDER — TIZANIDINE 4 MG/1
TABLET ORAL
Qty: 270 TABLET | Refills: 3 | Status: SHIPPED | OUTPATIENT
Start: 2020-01-20 | End: 2020-03-04

## 2020-01-22 ENCOUNTER — TELEPHONE (OUTPATIENT)
Dept: INTERNAL MEDICINE | Facility: CLINIC | Age: 80
End: 2020-01-22

## 2020-01-22 RX ORDER — AZITHROMYCIN 500 MG/1
500 TABLET, FILM COATED ORAL DAILY
Qty: 7 TABLET | Refills: 0 | Status: SHIPPED | OUTPATIENT
Start: 2020-01-22 | End: 2020-01-29

## 2020-01-22 NOTE — TELEPHONE ENCOUNTER
----- Message from Jasmyn Santiago sent at 1/22/2020  1:46 PM CST -----  Type:  Needs Medical Advice    Who Called: Henrietta     Symptoms (please be specific): Cough     How long has patient had these symptoms:  X 14 days     Pharmacy name and phone #:  [id number] [place] [address] AT [facility] [address] [phone] (Phone)  [phone] (Fax)    Would the patient rather a call back or a response via [facility]? Call back     Best Call Back Number:[phone]      Additional Information: Henrietta would like to have something called in for her cough.

## 2020-01-27 ENCOUNTER — OFFICE VISIT (OUTPATIENT)
Dept: OPHTHALMOLOGY | Facility: CLINIC | Age: 80
End: 2020-01-27
Payer: MEDICARE

## 2020-01-27 VITALS — SYSTOLIC BLOOD PRESSURE: 133 MMHG | HEART RATE: 75 BPM | DIASTOLIC BLOOD PRESSURE: 75 MMHG

## 2020-01-27 DIAGNOSIS — H43.821 VITREOMACULAR ADHESION, RIGHT: ICD-10-CM

## 2020-01-27 DIAGNOSIS — H35.372 EPIRETINAL MEMBRANE, LEFT: Primary | ICD-10-CM

## 2020-01-27 PROCEDURE — 92134 CPTRZ OPH DX IMG PST SGM RTA: CPT | Mod: HCNC,S$GLB,, | Performed by: OPHTHALMOLOGY

## 2020-01-27 PROCEDURE — 92202 OPSCPY EXTND ON/MAC DRAW: CPT | Mod: HCNC,S$GLB,, | Performed by: OPHTHALMOLOGY

## 2020-01-27 PROCEDURE — 92014 PR EYE EXAM, EST PATIENT,COMPREHESV: ICD-10-PCS | Mod: HCNC,S$GLB,, | Performed by: OPHTHALMOLOGY

## 2020-01-27 PROCEDURE — 92014 COMPRE OPH EXAM EST PT 1/>: CPT | Mod: HCNC,S$GLB,, | Performed by: OPHTHALMOLOGY

## 2020-01-27 PROCEDURE — 99999 PR PBB SHADOW E&M-EST. PATIENT-LVL III: CPT | Mod: PBBFAC,HCNC,, | Performed by: OPHTHALMOLOGY

## 2020-01-27 PROCEDURE — 92134 POSTERIOR SEGMENT OCT RETINA (OCULAR COHERENCE TOMOGRAPHY)-BOTH EYES: ICD-10-PCS | Mod: HCNC,S$GLB,, | Performed by: OPHTHALMOLOGY

## 2020-01-27 PROCEDURE — 92202 PR OPHTHALMOSCOPY, EXT, W/DRAW OPTIC NERVE/MACULA, I&R, UNI/BI: ICD-10-PCS | Mod: HCNC,S$GLB,, | Performed by: OPHTHALMOLOGY

## 2020-01-27 PROCEDURE — 99999 PR PBB SHADOW E&M-EST. PATIENT-LVL III: ICD-10-PCS | Mod: PBBFAC,HCNC,, | Performed by: OPHTHALMOLOGY

## 2020-01-27 NOTE — PROGRESS NOTES
HPI     Overdue f/u OCT   DLS-06/04/2019 Dr. Chan    Pt sts eyes still tearing mainly OS occasional pain in OS.   Vision seems to be about the same     (-)Flashes (+)Floaters  (+)Photophobia  (-)Glare    Systane PRN         OCT - OD VMT with progression  OS - small ERM      A/P    1. VMT OD  Increasing traction, but ASx  Monitor for now    2. Small ERM OS  ASx    3. PCIOL OU    4. KCS  AT's      1 year OCT

## 2020-01-29 ENCOUNTER — OFFICE VISIT (OUTPATIENT)
Dept: INTERNAL MEDICINE | Facility: CLINIC | Age: 80
End: 2020-01-29
Payer: MEDICARE

## 2020-01-29 VITALS
SYSTOLIC BLOOD PRESSURE: 118 MMHG | HEART RATE: 76 BPM | DIASTOLIC BLOOD PRESSURE: 68 MMHG | BODY MASS INDEX: 32.91 KG/M2 | WEIGHT: 217.13 LBS | TEMPERATURE: 98 F | HEIGHT: 68 IN

## 2020-01-29 DIAGNOSIS — M46.06 SPINAL ENTHESOPATHY OF LUMBAR REGION: ICD-10-CM

## 2020-01-29 DIAGNOSIS — M48.062 SPINAL STENOSIS OF LUMBAR REGION WITH NEUROGENIC CLAUDICATION: ICD-10-CM

## 2020-01-29 DIAGNOSIS — M54.2 NECK PAIN ON LEFT SIDE: Primary | ICD-10-CM

## 2020-01-29 DIAGNOSIS — E66.9 OBESITY (BMI 30-39.9): ICD-10-CM

## 2020-01-29 DIAGNOSIS — M54.9 OTHER CHRONIC BACK PAIN: Chronic | ICD-10-CM

## 2020-01-29 DIAGNOSIS — G89.29 OTHER CHRONIC BACK PAIN: Chronic | ICD-10-CM

## 2020-01-29 DIAGNOSIS — G89.29 OTHER CHRONIC PAIN: ICD-10-CM

## 2020-01-29 PROCEDURE — 1159F MED LIST DOCD IN RCRD: CPT | Mod: HCNC,S$GLB,, | Performed by: NURSE PRACTITIONER

## 2020-01-29 PROCEDURE — 3074F SYST BP LT 130 MM HG: CPT | Mod: HCNC,CPTII,S$GLB, | Performed by: NURSE PRACTITIONER

## 2020-01-29 PROCEDURE — 99214 PR OFFICE/OUTPT VISIT, EST, LEVL IV, 30-39 MIN: ICD-10-PCS | Mod: HCNC,S$GLB,, | Performed by: NURSE PRACTITIONER

## 2020-01-29 PROCEDURE — 1159F PR MEDICATION LIST DOCUMENTED IN MEDICAL RECORD: ICD-10-PCS | Mod: HCNC,S$GLB,, | Performed by: NURSE PRACTITIONER

## 2020-01-29 PROCEDURE — 3078F PR MOST RECENT DIASTOLIC BLOOD PRESSURE < 80 MM HG: ICD-10-PCS | Mod: HCNC,CPTII,S$GLB, | Performed by: NURSE PRACTITIONER

## 2020-01-29 PROCEDURE — 99999 PR PBB SHADOW E&M-EST. PATIENT-LVL III: ICD-10-PCS | Mod: PBBFAC,HCNC,, | Performed by: NURSE PRACTITIONER

## 2020-01-29 PROCEDURE — 99999 PR PBB SHADOW E&M-EST. PATIENT-LVL III: CPT | Mod: PBBFAC,HCNC,, | Performed by: NURSE PRACTITIONER

## 2020-01-29 PROCEDURE — 99214 OFFICE O/P EST MOD 30 MIN: CPT | Mod: HCNC,S$GLB,, | Performed by: NURSE PRACTITIONER

## 2020-01-29 PROCEDURE — 1125F AMNT PAIN NOTED PAIN PRSNT: CPT | Mod: HCNC,S$GLB,, | Performed by: NURSE PRACTITIONER

## 2020-01-29 PROCEDURE — 1101F PR PT FALLS ASSESS DOC 0-1 FALLS W/OUT INJ PAST YR: ICD-10-PCS | Mod: HCNC,CPTII,S$GLB, | Performed by: NURSE PRACTITIONER

## 2020-01-29 PROCEDURE — 1125F PR PAIN SEVERITY QUANTIFIED, PAIN PRESENT: ICD-10-PCS | Mod: HCNC,S$GLB,, | Performed by: NURSE PRACTITIONER

## 2020-01-29 PROCEDURE — 3078F DIAST BP <80 MM HG: CPT | Mod: HCNC,CPTII,S$GLB, | Performed by: NURSE PRACTITIONER

## 2020-01-29 PROCEDURE — 1101F PT FALLS ASSESS-DOCD LE1/YR: CPT | Mod: HCNC,CPTII,S$GLB, | Performed by: NURSE PRACTITIONER

## 2020-01-29 PROCEDURE — 3074F PR MOST RECENT SYSTOLIC BLOOD PRESSURE < 130 MM HG: ICD-10-PCS | Mod: HCNC,CPTII,S$GLB, | Performed by: NURSE PRACTITIONER

## 2020-01-29 NOTE — PROGRESS NOTES
Subjective:       Patient ID: Henrietta Villasenor is a 79 y.o. female.    Chief Complaint: Neck Pain (pain=10 pain 4weeks) and Shoulder Pain (L pain=10)    Pt of Dr. Arriaga, here for left neck pain and shoulder pain. States it started yesterday. Starts from left deltoid to shoulder to neck and upper back. Describes the pain as pulling in nature. She has had this neck pain before, had a CT scan on 1-2-20, results normal, see below. She use to see pain management and Dr. Le for her back, states she has not not see her in months. States she never took the tizanidine, has been taking percocet she has at home and this is not helping.     Recently treated by PCP for a cough with azithromycin and cough syrup, she has completed this medication.     CT Cervical Spine Without Contrast   Order: [id number]   Status:  Final result   Visible to patient:  No (Not Released) Next appt:  02/20/2020 at 10:00 AM in Lab (LAB, APPOINTMENT N[facility] IN[facility])   Details     Reading Physician Reading Date Result Priority   Marvin Levy MD 1/2/2020 STAT      Narrative     EXAMINATION:  CT CERVICAL SPINE WITHOUT CONTRAST    CLINICAL HISTORY:  C-spine trauma, NEXUS/CCR negative, low risk;    TECHNIQUE:  Low dose axial images, sagittal and coronal reformations were performed though the cervical spine.  Contrast was not administered.    COMPARISON:  None    FINDINGS:  No evidence of acute cervical spine fracture or dislocation.  Odontoid process is intact.  Craniocervical junction is unremarkable.  Cervical spine alignment is within normal limits.  Prominent multilevel degenerative changes are seen with DISH.    Surrounding soft tissues show no significant abnormalities.  Airway is patent.      Impression       No evidence of acute cervical spine fracture or dislocation.      Electronically signed by: Marvin Levy MD  Date: 01/02/2020  Time: 18:49                Review of Systems   Constitutional: Negative for activity change, appetite change,  chills, diaphoresis, fatigue, fever and unexpected weight change.   Respiratory: Negative for chest tightness and shortness of breath.    Cardiovascular: Negative for chest pain, palpitations and leg swelling.   Gastrointestinal: Negative for abdominal pain, constipation, diarrhea, nausea and vomiting.   Musculoskeletal: Positive for arthralgias and neck pain. Negative for back pain, gait problem, joint swelling, myalgias and neck stiffness.        As documented in HPI     Skin: Negative for color change, pallor, rash and wound.   Allergic/Immunologic: Negative for environmental allergies, food allergies and immunocompromised state.   Neurological: Negative for dizziness, tremors, syncope, weakness and numbness.   Hematological: Negative for adenopathy. Does not bruise/bleed easily.   Psychiatric/Behavioral: Negative for suicidal ideas.         Review of patient's allergies indicates:   Allergen Reactions    Crestor [rosuvastatin]      Cramping in legs    Ezetimibe Diarrhea     Other reaction(s): abdominal pain, Diarrhea      Hydrocodone Itching    Lisinopril      Other reaction(s): cough      Sulfa (sulfonamide antibiotics) Itching and Rash           Sulfamethoxazole     Sulfamethoxazole-trimethoprim     Valsartan Swelling     Current Outpatient Medications:     ACCU-CHEK FASTCLIX Misc, , Disp: , Rfl:     ACCU-CHEK SMARTVIEW TEST STRIP Strp, TEST THREE TIMES DAILY, Disp: 300 strip, Rfl: 3    albuterol (VENTOLIN HFA) 90 mcg/actuation inhaler, Inhale 2 puffs by mouth into the lungs every 4 (four) hours as needed for Wheezing. Rescue, Disp: 18 g, Rfl: 1    ALCOHOL ANTISEPTIC PADS (ALCOHOL PREP PADS TOP), , Disp: , Rfl:     allopurinol (ZYLOPRIM) 100 MG tablet, Take 1 tablet (100 mg total) by mouth once daily., Disp: 90 tablet, Rfl: 3    amLODIPine (NORVASC) 5 MG tablet, Take 1 tablet (5 mg total) by mouth once daily., Disp: 90 tablet, Rfl: 3    aspirin (ECOTRIN) 81 MG EC tablet, Take 1 tablet by mouth  "Daily., Disp: , Rfl:     atorvastatin (LIPITOR) 40 MG tablet, Take 1 tablet (40 mg total) by mouth once daily., Disp: 90 tablet, Rfl: 3    azelastine (ASTELIN) 137 mcg (0.1 %) nasal spray, Spray 1 spray (137 mcg total) by Nasal route 2 (two) times daily., Disp: 30 mL, Rfl: 3    BD ALCOHOL SWABS PadM, USE  WHEN  TESTING BLOOD SUGAR FOUR TIMES DAILY, Disp: 400 each, Rfl: 4    BD ULTRA-FINE OLU PEN NEEDLES 32 x 5/32 " Ndle, Uses 4 times daily, on multiple daily insulin injections, Disp: 130 each, Rfl: 12    carbamide peroxide (EAR WAX REMOVAL DROPS OTIC), , Disp: , Rfl:     carvedilol (COREG) 25 MG tablet, TAKE 1 TABLET TWICE DAILY, Disp: 180 tablet, Rfl: 3    cholecalciferol, vitamin D3, (DIALYVITE VITAMIN D) 5,000 unit capsule, Take 5,000 Units by mouth once daily., Disp: , Rfl:     cholecalciferol, vitamin D3, (VITAMIN D3) 400 unit Cap, Take 400 Units by mouth once daily., Disp: , Rfl:     DULoxetine (CYMBALTA) 60 MG capsule, Take 1 capsule (60 mg total) by mouth 2 (two) times daily., Disp: 180 capsule, Rfl: 3    fluticasone (FLONASE) 50 mcg/actuation nasal spray, SHAKE LQ AND U 2 SPRAYS IEN QD, Disp: 1 Bottle, Rfl: 6    furosemide (LASIX) 40 MG tablet, TAKE 1 TABLET BY MOUTH TWICE DAILY, Disp: 180 tablet, Rfl: 4    insulin (LANTUS SOLOSTAR U-100 INSULIN) glargine 100 units/mL (3mL) SubQ pen, Inject 55-60 units at night., Disp: 60 mL, Rfl: 1    insulin aspart (NOVOLOG) 100 unit/mL InPn pen, 23 units with meals plus correction scale. Max daily dose=120 units (Patient taking differently: Inject 23 Units into the skin 3 (three) times daily with meals. 23 units with meals plus correction scale. Max daily dose=120 units), Disp: 8 Box, Rfl: 3    Lactobacillus acidophilus (PROBIOTIC) 10 billion cell Cap, Take 1 capsule by mouth once daily., Disp: , Rfl:     loratadine (CLARITIN) 10 mg tablet, Take 1 tablet (10 mg total) by mouth once daily., Disp: 30 tablet, Rfl: 4    meclizine (ANTIVERT) 12.5 mg tablet, " "Take 1 tablet (12.5 mg total) by mouth 3 (three) times daily as needed for Dizziness., Disp: 30 tablet, Rfl: 0    MUSCLE RUB, WITH CAMPHOR, 4-30-10 % Crea, , Disp: , Rfl:     olopatadine (PATADAY) 0.2 % Drop, Place 1 drop into both eyes once daily., Disp: 2.5 mL, Rfl: 12    oxyCODONE-acetaminophen (PERCOCET)  mg per tablet, Take one tablet by mouth 5 times daily, Disp: 150 tablet, Rfl: 0    pen needle, diabetic (SURE-FINE PEN NEEDLES) 29 gauge x 1/2" Ndle, Use 4 times daily, Disp: 400 each, Rfl: 4    potassium chloride SA (K-DUR,KLOR-CON) 10 MEQ tablet, Take 1 tablet (10 mEq total) by mouth 2 (two) times daily., Disp: 60 tablet, Rfl: 3    promethazine-dextromethorphan (PROMETHAZINE-DM) 6.25-15 mg/5 mL Syrp, Take 2 teaspoonsful by mouth every 8 hours as needed for cough, Disp: 240 mL, Rfl: 1    rOPINIRole (REQUIP) 0.5 MG tablet, Take 1 tablet (0.5 mg total) by mouth every evening., Disp: 90 tablet, Rfl: 1    tiZANidine (ZANAFLEX) 4 MG tablet, TAKE 1/2 TO 1 TABLET EVERY 8  HOURS., Disp: 270 tablet, Rfl: 3    TRUEPLUS LANCETS 33 gauge Misc, TEST BLOOD SUGAR FOUR TIMES DAILY, Disp: 400 each, Rfl: 4    pantoprazole (PROTONIX) 40 MG tablet, TAKE 1 TABLET(40 MG) BY MOUTH twice daily, Disp: 180 tablet, Rfl: 4    triamcinolone acetonide 0.1% (KENALOG) 0.1 % cream, Apply topically 3 (three) times daily., Disp: 28.4 g, Rfl: 0  No current facility-administered medications for this visit.     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    " Neuralgia, neuritis, and radiculitis, unspecified    Dermatitis due to drugs and medicines taken internally(693.0)    Dermatochalasis    Diverticulosis of large intestine    Personal history of allergy to sulfonamides    Diastolic congestive heart failure    Knee pain    Anemia    Chronic right ear pain    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    Hearing loss, sensorineural    Stage 3 chronic kidney disease    Coronary artery disease involving native coronary artery of native heart with angina pectoris    Lactose intolerance    Allergic rhinitis    Hyperlipidemia    Aortic atherosclerosis    Spinal enthesopathy of lumbar region    LLQ abdominal pain    Diverticulitis of large intestine without perforation or abscess without bleeding    Esophageal dysphagia    Fatty liver    Painful swallowing    Left facial numbness    Neck pain on left side    Insulin dependent diabetes mellitus    Shortness of breath    Diabetic neuropathy associated with type 2 diabetes mellitus    Type 2 diabetes mellitus with stage 3 chronic kidney disease, with long-term current use of insulin    Obesity (BMI 30.0-34.9)    Low hemoglobin and low hematocrit    History of colon polyps    Type 2 diabetes mellitus with diabetic polyneuropathy, with long-term current use of insulin    Hyperparathyroidism    Obesity, diabetes, and hypertension syndrome    Spinal stenosis of lumbar region with neurogenic claudication    Senile nuclear sclerosis    Benign paroxysmal positional vertigo due to bilateral vestibular disorder    Tortuous aorta    Ectatic aorta    Insulin long-term use    Facet arthropathy    Mood disorder    Type 2 diabetes mellitus with hyperglycemia    Hypercholesterolemia    Hypovitaminosis D    Research study patient - TACT2 EDTA Chelation Study    History of MI (myocardial infarction)    Facial nerve palsy, secondary    Gout, arthritis    Vitreomacular adhesion, right    Epiretinal membrane, left     Chronic pain    Decreased ROM of lumbar spine    Weakness    Abnormality of gait and mobility    Decreased functional activity tolerance    Chest pain     Past Medical History:   Diagnosis Date    Abnormality of lung 11/08/2011    Stable bandlike opacities at the lung bases, most likely representing      Anxiety     Arthritis     CAD (coronary artery disease)     Calculus of ureter 2/22/2011    CHF (congestive heart failure)     Chronic back pain 7/29/2012    Colon polyp 9/2010; 11/2010    Colon polyps 7/29/2012    Coronary artery disease involving native coronary artery of native heart with angina pectoris     Depression     Diabetes mellitus     Diabetes mellitus type II     Diverticulitis large intestine 7/27/2015    Diverticulitis of large intestine without perforation or abscess without bleeding     Diverticulosis 09/25/2010; 11/02/2010; 11/08/2011; 7/29/2012    Duodenal disorder 08/25/2011    Duodenal erosion noted on EGD.    Duodenal ulcer, unspecified as acute or chronic, without hemorrhage, perforation, or obstruction 8/24/2011    E. coli sepsis 12/2010    Due to left ureteral stone with left nephrostomy tube - hospitalized in [place]    Esophageal dysmotility 01/24/2012    Noted on upper GI-barium swallow.    Facial weakness 1969    Left facial weakness s/p left mastoidectomy in ~ 1969.    Fatty liver 11/08/2011    Reported on CT-abdomen and in 06/2012 Gastro clinic visit note.    Gastric polyp 09/29/2010    GERD (gastroesophageal reflux disease) 7/29/2012    Hepatomegaly 11/08/2011    Reported on CT-abdomen    Herpes zoster with other nervous system complications(053.19) 2/28/2011    Hiatal hernia 06/26/2006; 09/29/2010; 08/25/2011    Noted on barium swallow 2006; noted on  EGD 2011.   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    Trouble in sleeping     Type II or unspecified type diabetes mellitus with neurological manifestations, not stated as uncontrolled(250.60)     Type II or unspecified type diabetes mellitus with peripheral circulatory disorders, not stated as uncontrolled(250.70)     Type II or unspecified type diabetes mellitus with renal manifestations, not stated as uncontrolled(250.40)      Past Surgical History:   Procedure Laterality Date    BELPHAROPTOSIS REPAIR      s/p LAMBERTO levator repair - Dr. Dejesus    CARDIAC CATHETERIZATION      CARPAL TUNNEL RELEASE  3/13/2012    CATARACT EXTRACTION W/  INTRAOCULAR LENS IMPLANT Right 10/31/2018        CATARACT EXTRACTION W/  INTRAOCULAR LENS IMPLANT Left 03/22/2018        CHOLECYSTECTOMY      COLONOSCOPY N/A 11/16/2016    Procedure: COLONOSCOPY;  Surgeon: Chris Storey MD;  Location: [address]);  Service: Endoscopy;  Laterality: N/A;    COLONOSCOPY N/A 6/14/2017    Procedure: COLONOSCOPY;  Surgeon: Chris Storey MD;  Location: [address]);  Service: Endoscopy;  Laterality: N/A;  colonoscopy in 3 months with better bowel prep. - Split PEG prep ordered    EXTRACORPOREAL SHOCK WAVE LITHOTRIPSY      INTRAOCULAR PROSTHESES INSERTION Right 10/31/2018    Procedure: INSERTION-INTRAOCULAR LENS (IOL);  Surgeon: Keysha Valle MD;  Location[facility];  Service: Ophthalmology;   Laterality: Right;    JOINT REPLACEMENT      mastoid tumor removal      Left mastoidectomy with residual left facial weakness.    NEPHROSTOMY      OOPHORECTOMY      PHACOEMULSIFICATION OF CATARACT Right 10/31/2018    Procedure: PHACOEMULSIFICATION-ASPIRATION-CATARACT;  Surgeon: Keysha Valle MD;  Location: [facility] OR;  Service: Ophthalmology;  Laterality: Right;    TOTAL ABDOMINAL HYSTERECTOMY  1969    TAHBSO    TOTAL KNEE ARTHROPLASTY  2012    Left    TRANSFORAMINAL EPIDURAL INJECTION OF STEROID Bilateral 2019    Procedure: INJECTION, STEROID, EPIDURAL, TRANSFORAMINAL APPROACH;  Surgeon: Oscar Ravi MD;  Location: [facility] PAIN [facility]GT;  Service: Pain Management;  Laterality: Bilateral;  B/L TFESI L4/L5     Social History     Socioeconomic History    Marital status: Single     Spouse name: Not on file    Number of children: 5    Years of education: Not on file    Highest education level: Not on file   Occupational History    Not on file   Social Needs    Financial resource strain: Not on file    Food insecurity:     Worry: Not on file     Inability: Not on file    Transportation needs:     Medical: Not on file     Non-medical: Not on file   Tobacco Use    Smoking status: Former Smoker     Packs/day: 1.00     Years: 15.00     Pack years: 15.00     Types: Cigarettes     Last attempt to quit: 2000     Years since quittin.5    Smokeless tobacco: Never Used   Substance and Sexual Activity    Alcohol use: No    Drug use: No    Sexual activity: Never   Lifestyle    Physical activity:     Days per week: Not on file     Minutes per session: Not on file    Stress: Not on file   Relationships    Social connections:     Talks on phone: Not on file     Gets together: Not on file     Attends Oriental orthodox service: Not on file     Active member of club or organization: Not on file     Attends meetings of clubs or organizations: Not on file     Relationship status: Not on file   Other  Topics Concern    Are you pregnant or think you may be? Not Asked    Breast-feeding Not Asked   Social History Narrative    Single with 5 children. Lives alone. Retired cook.     Family History   Problem Relation Age of Onset    Sleep apnea Sister     Diabetes Sister     Cancer Mother         brain tumor    Hypertension Mother     Heart disease Father     Heart attack Father     Dementia Brother     Lupus Brother     Frontotemporal dementia Brother     No Known Problems Daughter     No Known Problems Son     Diabetes Sister     Lupus Sister     Breast cancer Sister     Diabetes Sister     Cancer Sister         breast cancer    Heart attack Sister     Diabetes Sister     Liver cancer Brother     Drug abuse Brother     Alcohol abuse Brother     Cirrhosis Brother     Drug abuse Brother     No Known Problems Daughter     No Known Problems Son     No Known Problems Son     No Known Problems Maternal Grandmother     No Known Problems Maternal Grandfather     No Known Problems Paternal Grandmother     No Known Problems Paternal Grandfather     Diabetes Brother     Dementia Brother     Diabetes Other     Ovarian cancer Other         Niece     Breast cancer Maternal Aunt     No Known Problems Maternal Uncle     No Known Problems Paternal Aunt     No Known Problems Paternal Uncle     Glaucoma Neg Hx     Blindness Neg Hx     Celiac disease Neg Hx     Colon cancer Neg Hx     Colon polyps Neg Hx     Esophageal cancer Neg Hx     Inflammatory bowel disease Neg Hx     Liver disease Neg Hx     Rectal cancer Neg Hx     Stomach cancer Neg Hx     Ulcerative colitis Neg Hx     Melanoma Neg Hx     Multiple sclerosis Neg Hx     Psoriasis Neg Hx     Amblyopia Neg Hx     Cataracts Neg Hx     Macular degeneration Neg Hx     Retinal detachment Neg Hx     Strabismus Neg Hx     Stroke Neg Hx     Thyroid disease Neg Hx        Objective:       Vitals:    01/29/20 1347   BP: 118/68  "  Pulse: 76   Temp: 98.2 °F (36.8 °C)   Weight: 98.5 kg (217 lb 2.5 oz)   Height: 5' 8" (1.727 m)   PainSc: 10-Worst pain ever   PainLoc: Back       Body mass index is 33.02 kg/m².    Physical Exam   Constitutional: She is oriented to person, place, and time. She appears well-developed and well-nourished.   obese   HENT:   Head: Normocephalic.   Eyes: Pupils are equal, round, and reactive to light. Conjunctivae and EOM are normal.   Neck: Muscular tenderness present. No spinous process tenderness present. No neck rigidity. Decreased range of motion present. No edema and no erythema present.   Left neck tenderness, spasm on exam   Cardiovascular: Normal rate, regular rhythm, normal heart sounds and intact distal pulses.   Pulmonary/Chest: Effort normal and breath sounds normal.   Musculoskeletal:   Walks with a cane   Neurological: She is alert and oriented to person, place, and time.   Skin: Skin is warm and dry. Capillary refill takes less than 2 seconds.   Psychiatric: She has a normal mood and affect. Her behavior is normal. Judgment and thought content normal.   Nursing note and vitals reviewed.      Assessment:       1. Neck pain on left side    2. Spinal enthesopathy of lumbar region    3. Spinal stenosis of lumbar region with neurogenic claudication    4. Other chronic pain    5. Other chronic back pain    6. BMI 33.0-33.9,adult    7. Obesity (BMI 30-39.9)        Plan:       Henrietta was seen today for neck pain, shoulder pain and cough.    Diagnoses and all orders for this visit:    Neck pain on left side  Recommend taking the tizanidine 4mg 1/2 tablet to 1 tablet every 8 hours as needed for your neck pain, this was sent to [facility] mail order on 1-20-20 by Dr. Le, you may have it at home    Spinal enthesopathy of lumbar region  Spinal stenosis of lumbar region with neurogenic claudication  Other chronic pain  Other chronic back pain  We are rescheduling your pain management appointment from " yesterday    BMI 33.0-33.9,adult  BMI reviewed    Obesity (BMI 30-39.9)  BMI reviewed.    Diet and exercise to lose weight.    Follow up if symptoms worsen or fail to improve.

## 2020-01-29 NOTE — PATIENT INSTRUCTIONS
Recommend taking the tizanidine 4mg 1/2 tablet to 1 tablet every 8 hours as needed for your neck pain, this was sent to [facility] mail order on 1-20-20 by Dr. Le, you may have it at home    We are rescheduling your pain management appointment from yesterday

## 2020-01-30 ENCOUNTER — OFFICE VISIT (OUTPATIENT)
Dept: SPINE | Facility: CLINIC | Age: 80
End: 2020-01-30
Payer: MEDICARE

## 2020-01-30 VITALS
TEMPERATURE: 99 F | HEIGHT: 68 IN | DIASTOLIC BLOOD PRESSURE: 75 MMHG | SYSTOLIC BLOOD PRESSURE: 128 MMHG | RESPIRATION RATE: 18 BRPM | WEIGHT: 217 LBS | HEART RATE: 73 BPM | BODY MASS INDEX: 32.89 KG/M2

## 2020-01-30 DIAGNOSIS — M46.00 SPINAL ENTHESOPATHY: ICD-10-CM

## 2020-01-30 DIAGNOSIS — M79.18 MYOFASCIAL PAIN: Primary | ICD-10-CM

## 2020-01-30 DIAGNOSIS — M51.36 DDD (DEGENERATIVE DISC DISEASE), LUMBAR: ICD-10-CM

## 2020-01-30 DIAGNOSIS — M54.50 CHRONIC BILATERAL LOW BACK PAIN WITHOUT SCIATICA: ICD-10-CM

## 2020-01-30 DIAGNOSIS — M48.062 SPINAL STENOSIS OF LUMBAR REGION WITH NEUROGENIC CLAUDICATION: ICD-10-CM

## 2020-01-30 DIAGNOSIS — G89.29 CHRONIC BILATERAL LOW BACK PAIN WITHOUT SCIATICA: ICD-10-CM

## 2020-01-30 DIAGNOSIS — M47.816 LUMBAR SPONDYLOSIS: ICD-10-CM

## 2020-01-30 DIAGNOSIS — M48.061 SPINAL STENOSIS OF LUMBAR REGION, UNSPECIFIED WHETHER NEUROGENIC CLAUDICATION PRESENT: ICD-10-CM

## 2020-01-30 PROCEDURE — 99213 OFFICE O/P EST LOW 20 MIN: CPT | Mod: 25,HCNC,S$GLB, | Performed by: NURSE PRACTITIONER

## 2020-01-30 PROCEDURE — 99213 PR OFFICE/OUTPT VISIT, EST, LEVL III, 20-29 MIN: ICD-10-PCS | Mod: 25,HCNC,S$GLB, | Performed by: NURSE PRACTITIONER

## 2020-01-30 PROCEDURE — 1125F PR PAIN SEVERITY QUANTIFIED, PAIN PRESENT: ICD-10-PCS | Mod: HCNC,S$GLB,, | Performed by: NURSE PRACTITIONER

## 2020-01-30 PROCEDURE — 1101F PR PT FALLS ASSESS DOC 0-1 FALLS W/OUT INJ PAST YR: ICD-10-PCS | Mod: HCNC,CPTII,S$GLB, | Performed by: NURSE PRACTITIONER

## 2020-01-30 PROCEDURE — 99999 PR PBB SHADOW E&M-EST. PATIENT-LVL III: ICD-10-PCS | Mod: PBBFAC,HCNC,, | Performed by: NURSE PRACTITIONER

## 2020-01-30 PROCEDURE — 20553 NJX 1/MLT TRIGGER POINTS 3/>: CPT | Mod: HCNC,S$GLB,, | Performed by: NURSE PRACTITIONER

## 2020-01-30 PROCEDURE — 20553 PR INJECT TRIGGER POINTS, > 3: ICD-10-PCS | Mod: HCNC,S$GLB,, | Performed by: NURSE PRACTITIONER

## 2020-01-30 PROCEDURE — 3078F PR MOST RECENT DIASTOLIC BLOOD PRESSURE < 80 MM HG: ICD-10-PCS | Mod: HCNC,CPTII,S$GLB, | Performed by: NURSE PRACTITIONER

## 2020-01-30 PROCEDURE — 3074F SYST BP LT 130 MM HG: CPT | Mod: HCNC,CPTII,S$GLB, | Performed by: NURSE PRACTITIONER

## 2020-01-30 PROCEDURE — 99499 RISK ADDL DX/OHS AUDIT: ICD-10-PCS | Mod: HCNC,S$GLB,, | Performed by: NURSE PRACTITIONER

## 2020-01-30 PROCEDURE — 3078F DIAST BP <80 MM HG: CPT | Mod: HCNC,CPTII,S$GLB, | Performed by: NURSE PRACTITIONER

## 2020-01-30 PROCEDURE — 1125F AMNT PAIN NOTED PAIN PRSNT: CPT | Mod: HCNC,S$GLB,, | Performed by: NURSE PRACTITIONER

## 2020-01-30 PROCEDURE — 1159F PR MEDICATION LIST DOCUMENTED IN MEDICAL RECORD: ICD-10-PCS | Mod: HCNC,S$GLB,, | Performed by: NURSE PRACTITIONER

## 2020-01-30 PROCEDURE — 99499 UNLISTED E&M SERVICE: CPT | Mod: HCNC,S$GLB,, | Performed by: NURSE PRACTITIONER

## 2020-01-30 PROCEDURE — 99999 PR PBB SHADOW E&M-EST. PATIENT-LVL III: CPT | Mod: PBBFAC,HCNC,, | Performed by: NURSE PRACTITIONER

## 2020-01-30 PROCEDURE — 1101F PT FALLS ASSESS-DOCD LE1/YR: CPT | Mod: HCNC,CPTII,S$GLB, | Performed by: NURSE PRACTITIONER

## 2020-01-30 PROCEDURE — 3074F PR MOST RECENT SYSTOLIC BLOOD PRESSURE < 130 MM HG: ICD-10-PCS | Mod: HCNC,CPTII,S$GLB, | Performed by: NURSE PRACTITIONER

## 2020-01-30 PROCEDURE — 1159F MED LIST DOCD IN RCRD: CPT | Mod: HCNC,S$GLB,, | Performed by: NURSE PRACTITIONER

## 2020-01-30 RX ORDER — METHYLPREDNISOLONE ACETATE 40 MG/ML
40 INJECTION, SUSPENSION INTRA-ARTICULAR; INTRALESIONAL; INTRAMUSCULAR; SOFT TISSUE
Status: COMPLETED | OUTPATIENT
Start: 2020-01-30 | End: 2020-01-30

## 2020-01-30 RX ORDER — BUPIVACAINE HYDROCHLORIDE 2.5 MG/ML
9 INJECTION, SOLUTION EPIDURAL; INFILTRATION; INTRACAUDAL
Status: COMPLETED | OUTPATIENT
Start: 2020-01-30 | End: 2020-01-30

## 2020-01-30 RX ADMIN — BUPIVACAINE HYDROCHLORIDE 22.5 MG: 2.5 INJECTION, SOLUTION EPIDURAL; INFILTRATION; INTRACAUDAL at 03:01

## 2020-01-30 RX ADMIN — METHYLPREDNISOLONE ACETATE 40 MG: 40 INJECTION, SUSPENSION INTRA-ARTICULAR; INTRALESIONAL; INTRAMUSCULAR; SOFT TISSUE at 03:01

## 2020-01-30 NOTE — PROGRESS NOTES
Chronic Pain - Established Visit    Referring Physician: No ref. provider found    Chief Complaint:   No chief complaint on file.       SUBJECTIVE:    She was sitting down and slid out of the chair.  She was lightheaded. She was having some vertigo. She went to the hospital the next day.     Interval History 1/30/2020:  The patient is here to discuss new onset neck pain.  It is mainly located over the left neck and shoulder area.  She denies radiation past the shoulder.  She denies numbness and tingling.  She denies weakness.  She had a recent ED visit for dizziness.  She had CT scans to rule out clot.  She would like an injection today.  She denies bowel or bladder changes.  Her pain today is 10/10.    Interval History 12/3/2019:  The patient is here for follow up of back pain.  Her pain is radiating down the sides of both legs with numbness.  Her BP remains elevated today.  She is followed by her PCP for this.  She denies SOB or chest pain today.  She thinks that it is always high when she comes to [place] due to walking far from the garage.  She did have benefit with previous bilateral L4-5 TF JANEE.  Her pain today is 4/10.  The patient denies any bowel or bladder incontinence or signs of saddle paresthesia.  The patient denies any major medical changes since last office visit.    Interval History 10/1/2019:  The patient is here for follow up today of back and leg pain.  She is s/p bilateral L4-5 TF JANEE with 50% relief.  She says that her pain is more tolerable.  She has more pain with walking, such as walking to our office today.  She has persistent burning to both feet.  She has a known history of diabetes.  Her pain is worse at night and she describes it as burning.  She has intermittent radiation down the sides of both legs.  Her back pain is greater than her leg pain.  She takes Percocet from her PCP with benefit.  She would like to repeat Healthy Back.  She feels as though this was helpful and would like to  restart.  Her pain today is 7/10.  Her blood pressure is elevated today.  She says that she has not taken her medication today because she was rushing to get to her appointment.  She denies headache, SOB, or chest pain.     Initial Encounter:  Henrietta Villasenor presents to the clinic for the evaluation of low back pain. The pain started and symptoms have been worsening.The pain is located in the low back area and radiates to the bilateral leg swelling.  The pain is described as sharp, shooting, throbbing, tingling and constant and is rated as 5/10. The pain is rated with a score of  5/10 on the BEST day and a score of 8/10 on the WORST day.  Symptoms interfere with daily activity and sleeping. The pain is exacerbated by Standing, Bending, Coughing/Sneezing, Walking, Morning, Lifting and Getting out of bed/chair.  The pain is mitigated by medications. She reports spending 0 hours per day reclining. The patient reports 2 hours of uninterrupted sleep per night.    Patient denies night fever/night sweats, urinary incontinence, bowel incontinence, significant weight loss, significant motor weakness and loss of sensations.    Physical Therapy/Home Exercise: yes      Pain Disability Index Review:  Last 3 PDI Scores 12/3/2019 10/1/2019 2019   Pain Disability Index (PDI) 40 50 49       Pain Medications:    - Opioids: Percocet (Oxycodone/Acetaminophen)  - Adjuvant Medications: Cymbalta ( Duloxetine) and Tizanidine  - Anti-Coagulants: None  - Others: None     report:  Reviewed and consistent with medication use as prescribed.    Pain Procedures:   19 Bilateral L4-5 TF JANEE- 50% relief    Imagin18 MRI Lumbar  Narrative     EXAMINATION:  MRI LUMBAR SPINE WITHOUT CONTRAST    CLINICAL HISTORY:  low back and right leg pain; Other intervertebral disc degeneration, lumbar region    TECHNIQUE:  Multiplanar, multisequence MR images were acquired from the thoracolumbar junction to the sacrum without the administration  of contrast.    COMPARISON:  Radiograph 06/05/2018    FINDINGS:  Alignment: Normal.    Vertebrae: Normal marrow signal. No fracture.    Discs: Normal height and signal.    Cord: Normal.  Conus terminates at superior border of L1    Degenerative findings:    T12-L1: Mild broad-based disc bulge without compressive sequelae.    L1-L2: Unremarkable.    L2-L3: Mild broad-based disc bulge with facet arthropathy.  No compressive sequelae.    L3-L4: Broad-based disc bulge and facet arthropathy.  There is mild bilateral neural foraminal narrowing.    L4-L5: Trace anterolisthesis of L4 on L5 with broad-based disc bulge and facet arthropathy.  This results in moderate bilateral neural foraminal narrowing.  Moderate canal stenosis.  There is also significant narrowing of the left greater than right lateral recesses.    L5-S1: Broad-based disc bulge without significant compressive sequelae.    Paraspinal muscles & soft tissues: Paraspinal atrophy.  No concerning findings in the visualized abdomen.  Add      Impression       Moderate degenerative change at L4-5.  Milder degenerative changes elsewhere in the lumbar spine.     6/5/18 Xray Lumbar  Narrative     EXAMINATION:  XR LUMBAR SPINE 5 VIEW WITH FLEX AND EXT    CLINICAL HISTORY:  low back and right leg pain;  Other intervertebral disc degeneration, lumbar region    TECHNIQUE:  Five views of the lumbar spine plus flexion extension views were performed.    COMPARISON:  02/03/2015    FINDINGS:  The lumbar vertebra are intact.  No compression fracture is identified.  Degenerative changes are again seen with small osteophytes at multiple levels.  L4-5 disc space is mildly narrowed.  Degenerative changes are also noted at lower facet joints.  There is associated grade 1 spondylolisthesis at L4-5 which is stable between flexion and extension at about 6 mm.  Alignment is otherwise satisfactory.    Visualized abdomen shows aortic atherosclerosis and surgical clips in the right upper  quadrant.      Impression       No acute abnormality.  Chronic degenerative spine changes.           Past Medical History:   Diagnosis Date    Abnormality of lung 11/08/2011    Stable bandlike opacities at the lung bases, most likely representing      Anxiety     Arthritis     CAD (coronary artery disease)     Calculus of ureter 2/22/2011    CHF (congestive heart failure)     Chronic back pain 7/29/2012    Colon polyp 9/2010; 11/2010    Colon polyps 7/29/2012    Coronary artery disease involving native coronary artery of native heart with angina pectoris     Depression     Diabetes mellitus     Diabetes mellitus type II     Diverticulitis large intestine 7/27/2015    Diverticulitis of large intestine without perforation or abscess without bleeding     Diverticulosis 09/25/2010; 11/02/2010; 11/08/2011; 7/29/2012    Duodenal disorder 08/25/2011    Duodenal erosion noted on EGD.    Duodenal ulcer, unspecified as acute or chronic, without hemorrhage, perforation, or obstruction 8/24/2011    E. coli sepsis 12/2010    Due to left ureteral stone with left nephrostomy tube - hospitalized in [place]    Esophageal dysmotility 01/24/2012    Noted on upper GI-barium swallow.    Facial weakness 1969    Left facial weakness s/p left mastoidectomy in ~ 1969.    Fatty liver 11/08/2011    Reported on CT-abdomen and in 06/2012 Gastro clinic visit note.    Gastric polyp 09/29/2010    GERD (gastroesophageal reflux disease) 7/29/2012    Hepatomegaly 11/08/2011    Reported on CT-abdomen    Herpes zoster with other nervous system complications(053.19) 2/28/2011    Hiatal hernia 06/26/2006; 09/29/2010; 08/25/2011    Noted on barium swallow 2006; noted on  EGD 2011.   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    Trouble in sleeping     Type II or unspecified type diabetes mellitus with neurological manifestations, not stated as uncontrolled(250.60)     Type II or unspecified type diabetes mellitus with peripheral circulatory disorders, not stated as uncontrolled(250.70)     Type II or unspecified type diabetes mellitus with renal manifestations, not stated as uncontrolled(250.40)      Past Surgical History:   Procedure Laterality Date    BELPHAROPTOSIS REPAIR      s/p LAMBERTO levator repair - Dr. Dejesus    CARDIAC CATHETERIZATION      CARPAL TUNNEL RELEASE  3/13/2012    CATARACT EXTRACTION W/  INTRAOCULAR LENS IMPLANT Right 10/31/2018        CATARACT EXTRACTION W/  INTRAOCULAR LENS IMPLANT Left 03/22/2018        CHOLECYSTECTOMY      COLONOSCOPY N/A 11/16/2016    Procedure: COLONOSCOPY;  Surgeon: Chris Storey MD;  Location: [address]);  Service: Endoscopy;  Laterality: N/A;    COLONOSCOPY N/A 6/14/2017    Procedure: COLONOSCOPY;  Surgeon: Chris Storey MD;  Location: [address]);  Service: Endoscopy;  Laterality: N/A;  colonoscopy in 3 months with better bowel prep. - Split PEG prep ordered    EXTRACORPOREAL SHOCK WAVE LITHOTRIPSY      INTRAOCULAR PROSTHESES INSERTION Right 10/31/2018    Procedure: INSERTION-INTRAOCULAR LENS (IOL);  Surgeon: Keysha Valle MD;  [facility];  Service: Ophthalmology;  Laterality: Right;    JOINT REPLACEMENT      mastoid tumor  removal      Left mastoidectomy with residual left facial weakness.    NEPHROSTOMY      OOPHORECTOMY      PHACOEMULSIFICATION OF CATARACT Right 10/31/2018    Procedure: PHACOEMULSIFICATION-ASPIRATION-CATARACT;  Surgeon: Keysha Valle MD;  Location: [facility] OR;  Service: Ophthalmology;  Laterality: Right;    TOTAL ABDOMINAL HYSTERECTOMY  1969    TAHBSO    TOTAL KNEE ARTHROPLASTY  2012    Left    TRANSFORAMINAL EPIDURAL INJECTION OF STEROID Bilateral 2019    Procedure: INJECTION, STEROID, EPIDURAL, TRANSFORAMINAL APPROACH;  Surgeon: Oscar Ravi MD;  Location: [facility];  Service: Pain Management;  Laterality: Bilateral;  B/L TFESI L4/L5     Social History     Socioeconomic History    Marital status: Single     Spouse name: Not on file    Number of children: 5    Years of education: Not on file    Highest education level: Not on file   Occupational History    Not on file   Social Needs    Financial resource strain: Not on file    Food insecurity:     Worry: Not on file     Inability: Not on file    Transportation needs:     Medical: Not on file     Non-medical: Not on file   Tobacco Use    Smoking status: Former Smoker     Packs/day: 1.00     Years: 15.00     Pack years: 15.00     Types: Cigarettes     Last attempt to quit: 2000     Years since quittin.5    Smokeless tobacco: Never Used   Substance and Sexual Activity    Alcohol use: No    Drug use: No    Sexual activity: Never   Lifestyle    Physical activity:     Days per week: Not on file     Minutes per session: Not on file    Stress: Not on file   Relationships    Social connections:     Talks on phone: Not on file     Gets together: Not on file     Attends Jain service: Not on file     Active member of club or organization: Not on file     Attends meetings of clubs or organizations: Not on file     Relationship status: Not on file   Other Topics Concern    Are you pregnant or think you may be? Not  Asked    Breast-feeding Not Asked   Social History Narrative    Single with 5 children. Lives alone. Retired cook.     Family History   Problem Relation Age of Onset    Sleep apnea Sister     Diabetes Sister     Cancer Mother         brain tumor    Hypertension Mother     Heart disease Father     Heart attack Father     Dementia Brother     Lupus Brother     Frontotemporal dementia Brother     No Known Problems Daughter     No Known Problems Son     Diabetes Sister     Lupus Sister     Breast cancer Sister     Diabetes Sister     Cancer Sister         breast cancer    Heart attack Sister     Diabetes Sister     Liver cancer Brother     Drug abuse Brother     Alcohol abuse Brother     Cirrhosis Brother     Drug abuse Brother     No Known Problems Daughter     No Known Problems Son     No Known Problems Son     No Known Problems Maternal Grandmother     No Known Problems Maternal Grandfather     No Known Problems Paternal Grandmother     No Known Problems Paternal Grandfather     Diabetes Brother     Dementia Brother     Diabetes Other     Ovarian cancer Other         Niece     Breast cancer Maternal Aunt     No Known Problems Maternal Uncle     No Known Problems Paternal Aunt     No Known Problems Paternal Uncle     Glaucoma Neg Hx     Blindness Neg Hx     Celiac disease Neg Hx     Colon cancer Neg Hx     Colon polyps Neg Hx     Esophageal cancer Neg Hx     Inflammatory bowel disease Neg Hx     Liver disease Neg Hx     Rectal cancer Neg Hx     Stomach cancer Neg Hx     Ulcerative colitis Neg Hx     Melanoma Neg Hx     Multiple sclerosis Neg Hx     Psoriasis Neg Hx     Amblyopia Neg Hx     Cataracts Neg Hx     Macular degeneration Neg Hx     Retinal detachment Neg Hx     Strabismus Neg Hx     Stroke Neg Hx     Thyroid disease Neg Hx        Review of patient's allergies indicates:   Allergen Reactions    Crestor [rosuvastatin]      Cramping in legs     "Ezetimibe Diarrhea     Other reaction(s): abdominal pain, Diarrhea      Hydrocodone Itching    Lisinopril      Other reaction(s): cough      Sulfa (sulfonamide antibiotics) Itching and Rash           Sulfamethoxazole     Sulfamethoxazole-trimethoprim     Valsartan Swelling       Current Outpatient Medications   Medication Sig    ACCU-CHEK FASTCLIX Misc     ACCU-CHEK SMARTVIEW TEST STRIP Strp TEST THREE TIMES DAILY    albuterol (VENTOLIN HFA) 90 mcg/actuation inhaler Inhale 2 puffs by mouth into the lungs every 4 (four) hours as needed for Wheezing. Rescue    ALCOHOL ANTISEPTIC PADS (ALCOHOL PREP PADS TOP)     allopurinol (ZYLOPRIM) 100 MG tablet Take 1 tablet (100 mg total) by mouth once daily.    amLODIPine (NORVASC) 5 MG tablet Take 1 tablet (5 mg total) by mouth once daily.    aspirin (ECOTRIN) 81 MG EC tablet Take 1 tablet by mouth Daily.    atorvastatin (LIPITOR) 40 MG tablet Take 1 tablet (40 mg total) by mouth once daily.    azelastine (ASTELIN) 137 mcg (0.1 %) nasal spray Spray 1 spray (137 mcg total) by Nasal route 2 (two) times daily.    BD ALCOHOL SWABS PadM USE  WHEN  TESTING BLOOD SUGAR FOUR TIMES DAILY    BD ULTRA-FINE OLU PEN NEEDLES 32 x 5/32 " Ndle Uses 4 times daily, on multiple daily insulin injections    carbamide peroxide (EAR WAX REMOVAL DROPS OTIC)     carvedilol (COREG) 25 MG tablet TAKE 1 TABLET TWICE DAILY    cholecalciferol, vitamin D3, (DIALYVITE VITAMIN D) 5,000 unit capsule Take 5,000 Units by mouth once daily.    cholecalciferol, vitamin D3, (VITAMIN D3) 400 unit Cap Take 400 Units by mouth once daily.    DULoxetine (CYMBALTA) 60 MG capsule Take 1 capsule (60 mg total) by mouth 2 (two) times daily.    fluticasone (FLONASE) 50 mcg/actuation nasal spray SHAKE LQ AND U 2 SPRAYS IEN QD    furosemide (LASIX) 40 MG tablet TAKE 1 TABLET BY MOUTH TWICE DAILY    insulin (LANTUS SOLOSTAR U-100 INSULIN) glargine 100 units/mL (3mL) SubQ pen Inject 55-60 units at night. " "   insulin aspart (NOVOLOG) 100 unit/mL InPn pen 23 units with meals plus correction scale. Max daily dose=120 units (Patient taking differently: Inject 23 Units into the skin 3 (three) times daily with meals. 23 units with meals plus correction scale. Max daily dose=120 units)    Lactobacillus acidophilus (PROBIOTIC) 10 billion cell Cap Take 1 capsule by mouth once daily.    loratadine (CLARITIN) 10 mg tablet Take 1 tablet (10 mg total) by mouth once daily.    meclizine (ANTIVERT) 12.5 mg tablet Take 1 tablet (12.5 mg total) by mouth 3 (three) times daily as needed for Dizziness.    MUSCLE RUB, WITH CAMPHOR, 4-30-10 % Crea     olopatadine (PATADAY) 0.2 % Drop Place 1 drop into both eyes once daily.    oxyCODONE-acetaminophen (PERCOCET)  mg per tablet Take one tablet by mouth 5 times daily    pantoprazole (PROTONIX) 40 MG tablet TAKE 1 TABLET(40 MG) BY MOUTH twice daily    pen needle, diabetic (SURE-FINE PEN NEEDLES) 29 gauge x 1/2" Ndle Use 4 times daily    potassium chloride SA (K-DUR,KLOR-CON) 10 MEQ tablet Take 1 tablet (10 mEq total) by mouth 2 (two) times daily.    promethazine-dextromethorphan (PROMETHAZINE-DM) 6.25-15 mg/5 mL Syrp Take 2 teaspoonsful by mouth every 8 hours as needed for cough    rOPINIRole (REQUIP) 0.5 MG tablet Take 1 tablet (0.5 mg total) by mouth every evening.    tiZANidine (ZANAFLEX) 4 MG tablet TAKE 1/2 TO 1 TABLET EVERY 8  HOURS.    triamcinolone acetonide 0.1% (KENALOG) 0.1 % cream Apply topically 3 (three) times daily.    TRUEPLUS LANCETS 33 gauge Misc TEST BLOOD SUGAR FOUR TIMES DAILY     No current facility-administered medications for this visit.      Facility-Administered Medications Ordered in Other Visits   Medication    tetracaine HCl (PF) 0.5 % Drop 1 drop       REVIEW OF SYSTEMS:    GENERAL:  No weight loss, malaise or fevers.  HEENT:  Negative for frequent or significant headaches.  NECK:  Negative for lumps, goiter, pain and significant neck " "swelling.  RESPIRATORY:  Negative for cough, wheezing or shortness of breath. MARLON.  CARDIOVASCULAR:  Negative for chest pain, leg swelling or palpitations.  CAD and CHF.  GI:  Negative for abdominal discomfort, blood in stools or black stools or change in bowel habits. GERD.  MUSCULOSKELETAL:  Lower back and joint pain  SKIN:  Negative for lesions, rash, and itching.  PSYCH:  Negative for sleep disturbance, mood disorder and recent psychosocial stressors.  HEMATOLOGY/LYMPHOLOGY:  Negative for prolonged bleeding, bruising easily or swollen nodes. Anemia.  NEURO:   No history of headaches, syncope, paralysis, seizures or tremors.  All other reviewed and negative other than HPI.    OBJECTIVE:    /75   Pulse 73   Temp 98.7 °F (37.1 °C) (Oral)   Resp 18   Ht 5' 8" (1.727 m)   Wt 98.4 kg (217 lb)   LMP  (LMP Unknown)   BMI 32.99 kg/m²     PHYSICAL EXAMINATION:    General appearance: Well appearing, in no acute distress, alert and oriented x3.  Psych:  Mood and affect appropriate.  Skin: Skin color, texture, turgor normal, no rashes or lesions, in both upper and lower body.  Head/face:  Normocephalic, atraumatic. No palpable lymph nodes.  Cor: RRR  Pulm: CTA  GI:  Soft and non-tender.  Neck: Significant TTP over left cervical paraspinals and trapezius muscles.  Limited ROM with pain on lateral rotation.  Spurling is negative.  Back: Straight leg raising in the sitting and supine positions is negative to radicular pain.  Positive tenderness to palpation over bilateral lumbar facet joints.  Limited ROM on flexion and extension with pain.  Positive facet loading bilaterally.  Extremities: Peripheral joint ROM is full and pain free without obvious instability or laxity in all four extremities. No deformities, edema, or skin discoloration. Good capillary refill.  Musculoskeletal: Bilateral upper and lower extremity strength is normal and symmetric.  No atrophy or tone abnormalities are noted.  Neuro: Bilateral " upper and lower extremity coordination and muscle stretch reflexes are physiologic and symmetric.  Plantar response are downgoing.  Decreased sensation to BLE.  Gait: Antalgic- ambulates without assistance.    ASSESSMENT: 79 y.o. year old female with chronic lower back pain and leg pain consistent with the following diagnoses:    1. Myofascial pain     2. Lumbar spondylosis     3. Spinal stenosis of lumbar region with neurogenic claudication     4. Spinal stenosis of lumbar region, unspecified whether neurogenic claudication present     5. DDD (degenerative disc disease), lumbar     6. Spinal enthesopathy     7. Chronic bilateral low back pain without sciatica           PLAN:     - I have stressed the importance of physical activity and a home exercise plan to help with pain and improve health.  - Previous imaging was reviewed and discussed with the patient today.  - TPIs today as per below.  - If limited benefit consider MBB/RFA.  - RTC in 2 weeks.  - Counseled patient regarding the importance of physical therapy.      The above plan and management options were discussed at length with patient. Patient is in agreement with the above and verbalized understanding.     Maria R Haskins  01/30/2020       Trigger Point Injection:   The procedure was discussed with the patient including complications of nerve damage,  bleeding, infection, and failure of pain relief.   Trigger points were identified by palpation and marked. Alcohol prep of sites done. A mixture of 9mL 0.25% bupivacaine +40mg Depo-Medrol was prepared (10 mL total).   A 27-gauge needle was advanced to the point of maximal tenderness, and medication was injected after negative aspiration. All sites done in the same manner. Patient tolerated the procedure well and without complications. Sites injected included: cervical paraspinals and trapezius muscles on the left (4 sites total).

## 2020-02-10 RX ORDER — OXYCODONE AND ACETAMINOPHEN 10; 325 MG/1; MG/1
TABLET ORAL
Qty: 150 TABLET | Refills: 0 | Status: SHIPPED | OUTPATIENT
Start: 2020-02-10 | End: 2020-02-11 | Stop reason: SDUPTHER

## 2020-02-10 NOTE — TELEPHONE ENCOUNTER
----- Message from Jose Scott sent at 2/10/2020  8:50 AM CST -----  Contact: Patient [phone]  RX request - refill or new RX.  Is this a refill or new RX:  Refill  RX name and strength: oxyCODONE-acetaminophen (PERCOCET)  mg per tablet  Directions:   Is this a 30 day or 90 day RX:    Pharmacy name and phone # [facility]   Comments:      Please call an advise  Thank you

## 2020-02-10 NOTE — PROGRESS NOTES
Refill Routing Note     Medication(s) are appropriate for refill:    Medication Outside of Protocol    Appointments  past 15m or future 3m with PCP    Date Provider   Last Visit   1/8/2020 Jeannine Arriaga MD   Next Visit   3/11/2020 Jeannine Arriaga MD       Automatic Epic Protocol Generated Data:    Requested Prescriptions   Pending Prescriptions Disp Refills    oxyCODONE-acetaminophen (PERCOCET)  mg per tablet 150 tablet 0     Sig: Take one tablet by mouth 5 times daily       Narcotics Refill Protocol Passed - 2/10/2020  9:18 AM        Passed - Patient not pregnant        Passed - Patient seen within 3 months     Last visit with Jeannine Arriaga MD: 1/8/2020  Last visit in [facility]: 1/29/2020    Patient's next visit in [facility]: 3/11/2020           Passed - Med not refilled within 4 weeks           Note created:11:01 AM 02/10/2020

## 2020-02-11 RX ORDER — OXYCODONE AND ACETAMINOPHEN 10; 325 MG/1; MG/1
TABLET ORAL
Qty: 150 TABLET | Refills: 0 | Status: SHIPPED | OUTPATIENT
Start: 2020-02-11 | End: 2020-03-11 | Stop reason: SDUPTHER

## 2020-02-11 RX ORDER — OXYCODONE AND ACETAMINOPHEN 10; 325 MG/1; MG/1
TABLET ORAL
Qty: 150 TABLET | Refills: 0 | Status: CANCELLED | OUTPATIENT
Start: 2020-02-11

## 2020-02-11 NOTE — TELEPHONE ENCOUNTER
Pt showed up in lobby, she would like Rx sent to [facility] of [facility]. Please advise

## 2020-02-20 ENCOUNTER — LAB VISIT (OUTPATIENT)
Dept: LAB | Facility: HOSPITAL | Age: 80
End: 2020-02-20
Attending: INTERNAL MEDICINE
Payer: MEDICARE

## 2020-02-20 DIAGNOSIS — I10 ESSENTIAL HYPERTENSION: Chronic | ICD-10-CM

## 2020-02-20 DIAGNOSIS — G47.33 OSA ON CPAP: Chronic | ICD-10-CM

## 2020-02-20 DIAGNOSIS — R55 SYNCOPE AND COLLAPSE: ICD-10-CM

## 2020-02-20 DIAGNOSIS — E78.00 HYPERCHOLESTEROLEMIA: ICD-10-CM

## 2020-02-20 DIAGNOSIS — I25.119 CORONARY ARTERY DISEASE INVOLVING NATIVE CORONARY ARTERY OF NATIVE HEART WITH ANGINA PECTORIS: ICD-10-CM

## 2020-02-20 DIAGNOSIS — R07.9 CHEST PAIN, UNSPECIFIED TYPE: ICD-10-CM

## 2020-02-20 DIAGNOSIS — E11.42 TYPE 2 DIABETES MELLITUS WITH DIABETIC POLYNEUROPATHY, WITH LONG-TERM CURRENT USE OF INSULIN: ICD-10-CM

## 2020-02-20 DIAGNOSIS — Z79.4 TYPE 2 DIABETES MELLITUS WITH DIABETIC POLYNEUROPATHY, WITH LONG-TERM CURRENT USE OF INSULIN: ICD-10-CM

## 2020-02-20 DIAGNOSIS — I50.32 CHRONIC DIASTOLIC CONGESTIVE HEART FAILURE: ICD-10-CM

## 2020-02-20 LAB
ALBUMIN SERPL BCP-MCNC: 3.3 G/DL (ref 3.5–5.2)
ALP SERPL-CCNC: 97 U/L (ref 55–135)
ALT SERPL W/O P-5'-P-CCNC: 19 U/L (ref 10–44)
AST SERPL-CCNC: 23 U/L (ref 10–40)
BILIRUB DIRECT SERPL-MCNC: 0.3 MG/DL (ref 0.1–0.3)
BILIRUB SERPL-MCNC: 0.6 MG/DL (ref 0.1–1)
CHOLEST SERPL-MCNC: 175 MG/DL (ref 120–199)
CHOLEST/HDLC SERPL: 3.4 {RATIO} (ref 2–5)
HDLC SERPL-MCNC: 52 MG/DL (ref 40–75)
HDLC SERPL: 29.7 % (ref 20–50)
LDLC SERPL CALC-MCNC: 95 MG/DL (ref 63–159)
NONHDLC SERPL-MCNC: 123 MG/DL
PROT SERPL-MCNC: 7.7 G/DL (ref 6–8.4)
TRIGL SERPL-MCNC: 140 MG/DL (ref 30–150)

## 2020-02-20 PROCEDURE — 80061 LIPID PANEL: CPT | Mod: HCNC

## 2020-02-20 PROCEDURE — 80076 HEPATIC FUNCTION PANEL: CPT | Mod: HCNC

## 2020-02-20 PROCEDURE — 36415 COLL VENOUS BLD VENIPUNCTURE: CPT | Mod: HCNC

## 2020-02-24 ENCOUNTER — TELEPHONE (OUTPATIENT)
Dept: CARDIOLOGY | Facility: CLINIC | Age: 80
End: 2020-02-24

## 2020-02-24 NOTE — TELEPHONE ENCOUNTER
----- Message from Giovana Harper MD sent at 2/21/2020  1:56 PM CST -----  Cholesterol levels are at goal on atorvastatin.

## 2020-02-26 ENCOUNTER — PATIENT OUTREACH (OUTPATIENT)
Dept: ADMINISTRATIVE | Facility: HOSPITAL | Age: 80
End: 2020-02-26

## 2020-03-03 ENCOUNTER — TELEPHONE (OUTPATIENT)
Dept: PAIN MEDICINE | Facility: CLINIC | Age: 80
End: 2020-03-03

## 2020-03-03 NOTE — TELEPHONE ENCOUNTER
----- Message from Killian Sadler sent at 3/3/2020 11:19 AM CST -----  Contact: BRITTANY SWEET [[id number]]  Name of Who is Calling:BRITTANY SWEET [[id number]]       What is the request in detail: BRITTANY SWEET [[id number]] Patient would like a call back regarding a sooner appointment first available..... Please contact to further discuss and advise       Can the clinic reply by [facility]: yes       [facility][phone] (home)

## 2020-03-03 NOTE — TELEPHONE ENCOUNTER
Patient was not pleased with the 3/26/20 next available for Maria R Haskins Np. Staff offered for the patient to see Clover Kirby Np tomorrow 3/4/20 here in our office.     The patient accepted the appointment and expressed great thanks.

## 2020-03-04 ENCOUNTER — OFFICE VISIT (OUTPATIENT)
Dept: PAIN MEDICINE | Facility: CLINIC | Age: 80
End: 2020-03-04
Payer: MEDICARE

## 2020-03-04 VITALS
TEMPERATURE: 98 F | WEIGHT: 217 LBS | BODY MASS INDEX: 32.89 KG/M2 | DIASTOLIC BLOOD PRESSURE: 87 MMHG | HEIGHT: 68 IN | HEART RATE: 74 BPM | SYSTOLIC BLOOD PRESSURE: 159 MMHG

## 2020-03-04 DIAGNOSIS — M53.3 SACROILIAC JOINT PAIN: ICD-10-CM

## 2020-03-04 DIAGNOSIS — M47.816 LUMBAR SPONDYLOSIS: ICD-10-CM

## 2020-03-04 DIAGNOSIS — G89.4 CHRONIC PAIN DISORDER: ICD-10-CM

## 2020-03-04 DIAGNOSIS — M79.18 MYOFASCIAL PAIN: ICD-10-CM

## 2020-03-04 DIAGNOSIS — M51.36 DDD (DEGENERATIVE DISC DISEASE), LUMBAR: ICD-10-CM

## 2020-03-04 DIAGNOSIS — M47.812 CERVICAL SPONDYLOSIS: Primary | ICD-10-CM

## 2020-03-04 DIAGNOSIS — M54.16 LUMBAR RADICULOPATHY: ICD-10-CM

## 2020-03-04 DIAGNOSIS — M50.30 DDD (DEGENERATIVE DISC DISEASE), CERVICAL: ICD-10-CM

## 2020-03-04 PROCEDURE — 1125F AMNT PAIN NOTED PAIN PRSNT: CPT | Mod: HCNC,S$GLB,, | Performed by: NURSE PRACTITIONER

## 2020-03-04 PROCEDURE — 99999 PR PBB SHADOW E&M-EST. PATIENT-LVL III: CPT | Mod: PBBFAC,HCNC,, | Performed by: NURSE PRACTITIONER

## 2020-03-04 PROCEDURE — 3079F DIAST BP 80-89 MM HG: CPT | Mod: HCNC,CPTII,S$GLB, | Performed by: NURSE PRACTITIONER

## 2020-03-04 PROCEDURE — 99213 OFFICE O/P EST LOW 20 MIN: CPT | Mod: HCNC,S$GLB,, | Performed by: NURSE PRACTITIONER

## 2020-03-04 PROCEDURE — 1125F PR PAIN SEVERITY QUANTIFIED, PAIN PRESENT: ICD-10-PCS | Mod: HCNC,S$GLB,, | Performed by: NURSE PRACTITIONER

## 2020-03-04 PROCEDURE — 3079F PR MOST RECENT DIASTOLIC BLOOD PRESSURE 80-89 MM HG: ICD-10-PCS | Mod: HCNC,CPTII,S$GLB, | Performed by: NURSE PRACTITIONER

## 2020-03-04 PROCEDURE — 1159F MED LIST DOCD IN RCRD: CPT | Mod: HCNC,S$GLB,, | Performed by: NURSE PRACTITIONER

## 2020-03-04 PROCEDURE — 99999 PR PBB SHADOW E&M-EST. PATIENT-LVL III: ICD-10-PCS | Mod: PBBFAC,HCNC,, | Performed by: NURSE PRACTITIONER

## 2020-03-04 PROCEDURE — 1159F PR MEDICATION LIST DOCUMENTED IN MEDICAL RECORD: ICD-10-PCS | Mod: HCNC,S$GLB,, | Performed by: NURSE PRACTITIONER

## 2020-03-04 PROCEDURE — 1101F PR PT FALLS ASSESS DOC 0-1 FALLS W/OUT INJ PAST YR: ICD-10-PCS | Mod: HCNC,CPTII,S$GLB, | Performed by: NURSE PRACTITIONER

## 2020-03-04 PROCEDURE — 99213 PR OFFICE/OUTPT VISIT, EST, LEVL III, 20-29 MIN: ICD-10-PCS | Mod: HCNC,S$GLB,, | Performed by: NURSE PRACTITIONER

## 2020-03-04 PROCEDURE — 1101F PT FALLS ASSESS-DOCD LE1/YR: CPT | Mod: HCNC,CPTII,S$GLB, | Performed by: NURSE PRACTITIONER

## 2020-03-04 PROCEDURE — 3077F SYST BP >= 140 MM HG: CPT | Mod: HCNC,CPTII,S$GLB, | Performed by: NURSE PRACTITIONER

## 2020-03-04 PROCEDURE — 3077F PR MOST RECENT SYSTOLIC BLOOD PRESSURE >= 140 MM HG: ICD-10-PCS | Mod: HCNC,CPTII,S$GLB, | Performed by: NURSE PRACTITIONER

## 2020-03-04 RX ORDER — METHOCARBAMOL 500 MG/1
500 TABLET, FILM COATED ORAL 3 TIMES DAILY PRN
Qty: 30 TABLET | Refills: 0 | Status: SHIPPED | OUTPATIENT
Start: 2020-03-04 | End: 2020-03-14

## 2020-03-04 NOTE — PROGRESS NOTES
Chronic Pain - Established Visit    Referring Physician: No ref. provider found    Chief Complaint:   Chief Complaint   Patient presents with    Neck Pain        SUBJECTIVE:    Interval History 3/4/2020:  The patient returns to clinic today for follow up of neck pain. She reports that trigger point injections at last visit provided significant relief until the last week. She reports neck pain that is sharp and aching in nature. She denies any radiating arm pain. Her pain is worse with turning her head to the side. She reports difficulty doing housework due to pain. She is currently taking Zanaflex with limited relief. She does take Percocet per her PCP with benefit. She denies any other health changes. Her pain today is 9/10.        Interval History 1/30/2020:  The patient is here to discuss new onset neck pain.  It is mainly located over the left neck and shoulder area.  She denies radiation past the shoulder.  She denies numbness and tingling.  She denies weakness.  She had a recent ED visit for dizziness.  She had CT scans to rule out clot.  She would like an injection today.  She denies bowel or bladder changes.  Her pain today is 10/10.    Interval History 12/3/2019:  The patient is here for follow up of back pain.  Her pain is radiating down the sides of both legs with numbness.  Her BP remains elevated today.  She is followed by her PCP for this.  She denies SOB or chest pain today.  She thinks that it is always high when she comes to [employer] due to walking far from the garage.  She did have benefit with previous bilateral L4-5 TF JANEE.  Her pain today is 4/10.  The patient denies any bowel or bladder incontinence or signs of saddle paresthesia.  The patient denies any major medical changes since last office visit.    Interval History 10/1/2019:  The patient is here for follow up today of back and leg pain.  She is s/p bilateral L4-5 TF JANEE with 50% relief.  She says that her pain is more tolerable.  She has  more pain with walking, such as walking to our office today.  She has persistent burning to both feet.  She has a known history of diabetes.  Her pain is worse at night and she describes it as burning.  She has intermittent radiation down the sides of both legs.  Her back pain is greater than her leg pain.  She takes Percocet from her PCP with benefit.  She would like to repeat Healthy Back.  She feels as though this was helpful and would like to restart.  Her pain today is 7/10.  Her blood pressure is elevated today.  She says that she has not taken her medication today because she was rushing to get to her appointment.  She denies headache, SOB, or chest pain.     Initial Encounter:  Henrietta Villasenor presents to the clinic for the evaluation of low back pain. The pain started and symptoms have been worsening.The pain is located in the low back area and radiates to the bilateral leg swelling.  The pain is described as sharp, shooting, throbbing, tingling and constant and is rated as 5/10. The pain is rated with a score of  5/10 on the BEST day and a score of 8/10 on the WORST day.  Symptoms interfere with daily activity and sleeping. The pain is exacerbated by Standing, Bending, Coughing/Sneezing, Walking, Morning, Lifting and Getting out of bed/chair.  The pain is mitigated by medications. She reports spending 0 hours per day reclining. The patient reports 2 hours of uninterrupted sleep per night.    Patient denies night fever/night sweats, urinary incontinence, bowel incontinence, significant weight loss, significant motor weakness and loss of sensations.    Physical Therapy/Home Exercise: yes      Pain Disability Index Review:  Last 3 PDI Scores 3/4/2020 1/30/2020 12/3/2019   Pain Disability Index (PDI) 57 70 40       Pain Medications:    - Opioids: Percocet (Oxycodone/Acetaminophen)  - Adjuvant Medications: Cymbalta ( Duloxetine) and Tizanidine  - Anti-Coagulants: None  - Others: None     report:  Reviewed and  consistent with medication use as prescribed.    Pain Procedures:   9/17/19 Bilateral L4-5 TF JANEE- 50% relief    Imaging:  CT Cervical Spine 1/2/2020:    FINDINGS:  No evidence of acute cervical spine fracture or dislocation.  Odontoid process is intact.  Craniocervical junction is unremarkable.  Cervical spine alignment is within normal limits.  Prominent multilevel degenerative changes are seen with DISH.    Surrounding soft tissues show no significant abnormalities.  Airway is patent.      Impression       No evidence of acute cervical spine fracture or dislocation.         6/12/18 MRI Lumbar  Narrative     EXAMINATION:  MRI LUMBAR SPINE WITHOUT CONTRAST    CLINICAL HISTORY:  low back and right leg pain; Other intervertebral disc degeneration, lumbar region    TECHNIQUE:  Multiplanar, multisequence MR images were acquired from the thoracolumbar junction to the sacrum without the administration of contrast.    COMPARISON:  Radiograph 06/05/2018    FINDINGS:  Alignment: Normal.    Vertebrae: Normal marrow signal. No fracture.    Discs: Normal height and signal.    Cord: Normal.  Conus terminates at superior border of L1    Degenerative findings:    T12-L1: Mild broad-based disc bulge without compressive sequelae.    L1-L2: Unremarkable.    L2-L3: Mild broad-based disc bulge with facet arthropathy.  No compressive sequelae.    L3-L4: Broad-based disc bulge and facet arthropathy.  There is mild bilateral neural foraminal narrowing.    L4-L5: Trace anterolisthesis of L4 on L5 with broad-based disc bulge and facet arthropathy.  This results in moderate bilateral neural foraminal narrowing.  Moderate canal stenosis.  There is also significant narrowing of the left greater than right lateral recesses.    L5-S1: Broad-based disc bulge without significant compressive sequelae.    Paraspinal muscles & soft tissues: Paraspinal atrophy.  No concerning findings in the visualized abdomen.  Add      Impression       Moderate  degenerative change at L4-5.  Milder degenerative changes elsewhere in the lumbar spine.     6/5/18 Xray Lumbar  Narrative     EXAMINATION:  XR LUMBAR SPINE 5 VIEW WITH FLEX AND EXT    CLINICAL HISTORY:  low back and right leg pain;  Other intervertebral disc degeneration, lumbar region    TECHNIQUE:  Five views of the lumbar spine plus flexion extension views were performed.    COMPARISON:  02/03/2015    FINDINGS:  The lumbar vertebra are intact.  No compression fracture is identified.  Degenerative changes are again seen with small osteophytes at multiple levels.  L4-5 disc space is mildly narrowed.  Degenerative changes are also noted at lower facet joints.  There is associated grade 1 spondylolisthesis at L4-5 which is stable between flexion and extension at about 6 mm.  Alignment is otherwise satisfactory.    Visualized abdomen shows aortic atherosclerosis and surgical clips in the right upper quadrant.      Impression       No acute abnormality.  Chronic degenerative spine changes.           Past Medical History:   Diagnosis Date    Abnormality of lung 11/08/2011    Stable bandlike opacities at the lung bases, most likely representing      Anxiety     Arthritis     CAD (coronary artery disease)     Calculus of ureter 2/22/2011    CHF (congestive heart failure)     Chronic back pain 7/29/2012    Colon polyp 9/2010; 11/2010    Colon polyps 7/29/2012    Coronary artery disease involving native coronary artery of native heart with angina pectoris     Depression     Diabetes mellitus     Diabetes mellitus type II     Diverticulitis large intestine 7/27/2015    Diverticulitis of large intestine without perforation or abscess without bleeding     Diverticulosis 09/25/2010; 11/02/2010; 11/08/2011; 7/29/2012    Duodenal disorder 08/25/2011    Duodenal erosion noted on EGD.    Duodenal ulcer, unspecified as acute or chronic, without hemorrhage, perforation, or obstruction 8/24/2011    E. coli sepsis  12/2010    Due to left ureteral stone with left nephrostomy tube - hospitalized in [place]    Esophageal dysmotility 01/24/2012    Noted on upper GI-barium swallow.    Facial weakness 1969    Left facial weakness s/p left mastoidectomy in ~ 1969.    Fatty liver 11/08/2011    Reported on CT-abdomen and in 06/2012 Gastro clinic visit note.    Gastric polyp 09/29/2010    GERD (gastroesophageal reflux disease) 7/29/2012    Hepatomegaly 11/08/2011    Reported on CT-abdomen    Herpes zoster with other nervous system complications(053.19) 2/28/2011    Hiatal hernia 06/26/2006; 09/29/2010; 08/25/2011    Noted on barium swallow 2006; noted on  EGD 2011.   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    Trouble in sleeping     Type II or unspecified type diabetes mellitus with neurological manifestations, not stated as uncontrolled(250.60)     Type II or unspecified type diabetes mellitus with peripheral circulatory disorders, not stated as uncontrolled(250.70)     Type II or unspecified type diabetes mellitus with renal manifestations, not stated as uncontrolled(250.40)      Past Surgical History:   Procedure Laterality Date    BELPHAROPTOSIS REPAIR      s/p LAMBERTO levator repair - Dr. Dejessu    CARDIAC  CATHETERIZATION      CARPAL TUNNEL RELEASE  3/13/2012    CATARACT EXTRACTION W/  INTRAOCULAR LENS IMPLANT Right 10/31/2018        CATARACT EXTRACTION W/  INTRAOCULAR LENS IMPLANT Left 03/22/2018        CHOLECYSTECTOMY      COLONOSCOPY N/A 11/16/2016    Procedure: COLONOSCOPY;  Surgeon: Chris Storey MD;  Location: [facility] (4TH FLR);  Service: Endoscopy;  Laterality: N/A;    COLONOSCOPY N/A 6/14/2017    Procedure: COLONOSCOPY;  Surgeon: Chris Storey MD;  Location: [facility] ENDO (4TH FLR);  Service: Endoscopy;  Laterality: N/A;  colonoscopy in 3 months with better bowel prep. - Split PEG prep ordered    EXTRACORPOREAL SHOCK WAVE LITHOTRIPSY      INTRAOCULAR PROSTHESES INSERTION Right 10/31/2018    Procedure: INSERTION-INTRAOCULAR LENS (IOL);  Surgeon: Keysha Valle MD;  Location: [facility] OR;  Service: Ophthalmology;  Laterality: Right;    JOINT REPLACEMENT      mastoid tumor removal  1969    Left mastoidectomy with residual left facial weakness.    NEPHROSTOMY      OOPHORECTOMY      PHACOEMULSIFICATION OF CATARACT Right 10/31/2018    Procedure: PHACOEMULSIFICATION-ASPIRATION-CATARACT;  Surgeon: Keysha Valle MD;  Location: [facility] OR;  Service: Ophthalmology;  Laterality: Right;    TOTAL ABDOMINAL HYSTERECTOMY  1969    TAHBSO    TOTAL KNEE ARTHROPLASTY  9/13/2012    Left    TRANSFORAMINAL EPIDURAL INJECTION OF STEROID Bilateral 9/17/2019    Procedure: INJECTION, STEROID, EPIDURAL, TRANSFORAMINAL APPROACH;  Surgeon: Oscar Ravi MD;  Location: [facility] PAIN MGT;  Service: Pain Management;  Laterality: Bilateral;  B/L TFESI L4/L5     Social History     Socioeconomic History    Marital status: Single     Spouse name: Not on file    Number of children: 5    Years of education: Not on file    Highest education level: Not on file   Occupational History    Not on file   Social Needs    Financial resource strain: Not on file    Food insecurity:     Worry: Not on file      Inability: Not on file    Transportation needs:     Medical: Not on file     Non-medical: Not on file   Tobacco Use    Smoking status: Former Smoker     Packs/day: 1.00     Years: 15.00     Pack years: 15.00     Types: Cigarettes     Last attempt to quit: 2000     Years since quittin.6    Smokeless tobacco: Never Used   Substance and Sexual Activity    Alcohol use: No    Drug use: No    Sexual activity: Never   Lifestyle    Physical activity:     Days per week: Not on file     Minutes per session: Not on file    Stress: Not on file   Relationships    Social connections:     Talks on phone: Not on file     Gets together: Not on file     Attends Scientologist service: Not on file     Active member of club or organization: Not on file     Attends meetings of clubs or organizations: Not on file     Relationship status: Not on file   Other Topics Concern    Are you pregnant or think you may be? Not Asked    Breast-feeding Not Asked   Social History Narrative    Single with 5 children. Lives alone. Retired cook.     Family History   Problem Relation Age of Onset    Sleep apnea Sister     Diabetes Sister     Cancer Mother         brain tumor    Hypertension Mother     Heart disease Father     Heart attack Father     Dementia Brother     Lupus Brother     Frontotemporal dementia Brother     No Known Problems Daughter     No Known Problems Son     Diabetes Sister     Lupus Sister     Breast cancer Sister     Diabetes Sister     Cancer Sister         breast cancer    Heart attack Sister     Diabetes Sister     Liver cancer Brother     Drug abuse Brother     Alcohol abuse Brother     Cirrhosis Brother     Drug abuse Brother     No Known Problems Daughter     No Known Problems Son     No Known Problems Son     No Known Problems Maternal Grandmother     No Known Problems Maternal Grandfather     No Known Problems Paternal Grandmother     No Known Problems Paternal Grandfather      Diabetes Brother     Dementia Brother     Diabetes Other     Ovarian cancer Other         Niece     Breast cancer Maternal Aunt     No Known Problems Maternal Uncle     No Known Problems Paternal Aunt     No Known Problems Paternal Uncle     Glaucoma Neg Hx     Blindness Neg Hx     Celiac disease Neg Hx     Colon cancer Neg Hx     Colon polyps Neg Hx     Esophageal cancer Neg Hx     Inflammatory bowel disease Neg Hx     Liver disease Neg Hx     Rectal cancer Neg Hx     Stomach cancer Neg Hx     Ulcerative colitis Neg Hx     Melanoma Neg Hx     Multiple sclerosis Neg Hx     Psoriasis Neg Hx     Amblyopia Neg Hx     Cataracts Neg Hx     Macular degeneration Neg Hx     Retinal detachment Neg Hx     Strabismus Neg Hx     Stroke Neg Hx     Thyroid disease Neg Hx        Review of patient's allergies indicates:   Allergen Reactions    Crestor [rosuvastatin]      Cramping in legs    Ezetimibe Diarrhea     Other reaction(s): abdominal pain, Diarrhea      Hydrocodone Itching    Lisinopril      Other reaction(s): cough      Sulfa (sulfonamide antibiotics) Itching and Rash           Sulfamethoxazole     Sulfamethoxazole-trimethoprim     Valsartan Swelling       Current Outpatient Medications   Medication Sig    ACCU-CHEK FASTCLIX Misc     ACCU-CHEK SMARTVIEW TEST STRIP Strp TEST THREE TIMES DAILY    albuterol (VENTOLIN HFA) 90 mcg/actuation inhaler Inhale 2 puffs by mouth into the lungs every 4 (four) hours as needed for Wheezing. Rescue    ALCOHOL ANTISEPTIC PADS (ALCOHOL PREP PADS TOP)     allopurinol (ZYLOPRIM) 100 MG tablet Take 1 tablet (100 mg total) by mouth once daily.    amLODIPine (NORVASC) 5 MG tablet Take 1 tablet (5 mg total) by mouth once daily.    aspirin (ECOTRIN) 81 MG EC tablet Take 1 tablet by mouth Daily.    atorvastatin (LIPITOR) 40 MG tablet Take 1 tablet (40 mg total) by mouth once daily.    azelastine (ASTELIN) 137 mcg (0.1 %) nasal spray Spray 1 spray (137  "mcg total) by Nasal route 2 (two) times daily.    BD ALCOHOL SWABS PadM USE  WHEN  TESTING BLOOD SUGAR FOUR TIMES DAILY    BD ULTRA-FINE OLU PEN NEEDLES 32 x 5/32 " Ndle Uses 4 times daily, on multiple daily insulin injections    carbamide peroxide (EAR WAX REMOVAL DROPS OTIC)     carvedilol (COREG) 25 MG tablet TAKE 1 TABLET TWICE DAILY    cholecalciferol, vitamin D3, (DIALYVITE VITAMIN D) 5,000 unit capsule Take 5,000 Units by mouth once daily.    cholecalciferol, vitamin D3, (VITAMIN D3) 400 unit Cap Take 400 Units by mouth once daily.    DULoxetine (CYMBALTA) 60 MG capsule Take 1 capsule (60 mg total) by mouth 2 (two) times daily.    fluticasone (FLONASE) 50 mcg/actuation nasal spray SHAKE LQ AND U 2 SPRAYS IEN QD    furosemide (LASIX) 40 MG tablet TAKE 1 TABLET BY MOUTH TWICE DAILY    insulin (LANTUS SOLOSTAR U-100 INSULIN) glargine 100 units/mL (3mL) SubQ pen Inject 55-60 units at night.    insulin aspart (NOVOLOG) 100 unit/mL InPn pen 23 units with meals plus correction scale. Max daily dose=120 units (Patient taking differently: Inject 23 Units into the skin 3 (three) times daily with meals. 23 units with meals plus correction scale. Max daily dose=120 units)    Lactobacillus acidophilus (PROBIOTIC) 10 billion cell Cap Take 1 capsule by mouth once daily.    loratadine (CLARITIN) 10 mg tablet Take 1 tablet (10 mg total) by mouth once daily.    meclizine (ANTIVERT) 12.5 mg tablet Take 1 tablet (12.5 mg total) by mouth 3 (three) times daily as needed for Dizziness.    olopatadine (PATADAY) 0.2 % Drop Place 1 drop into both eyes once daily.    oxyCODONE-acetaminophen (PERCOCET)  mg per tablet Take one tablet by mouth 5 times daily    pantoprazole (PROTONIX) 40 MG tablet TAKE 1 TABLET(40 MG) BY MOUTH twice daily    pen needle, diabetic (SURE-FINE PEN NEEDLES) 29 gauge x 1/2" Ndle Use 4 times daily    potassium chloride SA (K-DUR,KLOR-CON) 10 MEQ tablet Take 1 tablet (10 mEq total) by " "mouth 2 (two) times daily.    rOPINIRole (REQUIP) 0.5 MG tablet Take 1 tablet (0.5 mg total) by mouth every evening.    TRUEPLUS LANCETS 33 gauge Misc TEST BLOOD SUGAR FOUR TIMES DAILY    methocarbamol (ROBAXIN) 500 MG Tab Take 1 tablet (500 mg total) by mouth 3 (three) times daily as needed.    triamcinolone acetonide 0.1% (KENALOG) 0.1 % cream Apply topically 3 (three) times daily.     No current facility-administered medications for this visit.      Facility-Administered Medications Ordered in Other Visits   Medication    tetracaine HCl (PF) 0.5 % Drop 1 drop       REVIEW OF SYSTEMS:    GENERAL:  No weight loss, malaise or fevers.  HEENT:  Negative for frequent or significant headaches.  NECK:  Negative for lumps, goiter, pain and significant neck swelling.  RESPIRATORY:  Negative for cough, wheezing or shortness of breath. MARLON.  CARDIOVASCULAR:  Negative for chest pain, leg swelling or palpitations.  CAD and CHF.  GI:  Negative for abdominal discomfort, blood in stools or black stools or change in bowel habits. GERD.  MUSCULOSKELETAL:  Lower back and joint pain  SKIN:  Negative for lesions, rash, and itching.  PSYCH:  Negative for sleep disturbance, mood disorder and recent psychosocial stressors.  HEMATOLOGY/LYMPHOLOGY:  Negative for prolonged bleeding, bruising easily or swollen nodes. Anemia.  NEURO:   No history of headaches, syncope, paralysis, seizures or tremors.  All other reviewed and negative other than HPI.    OBJECTIVE:    BP (!) 159/87   Pulse 74   Temp 98.3 °F (36.8 °C)   Ht 5' 8" (1.727 m)   Wt 98.4 kg (217 lb)   LMP  (LMP Unknown)   BMI 32.99 kg/m²     PHYSICAL EXAMINATION:    General appearance: Well appearing, in no acute distress, alert and oriented x3.  Psych:  Mood and affect appropriate.  Skin: Skin color, texture, turgor normal, no rashes or lesions, in both upper and lower body.  Head/face:  Normocephalic, atraumatic. No palpable lymph nodes.  Cor: RRR  Pulm: CTA  GI:  Soft and " non-tender.  Neck: Significant TTP over bilateral cervical paraspinals and trapezius muscles.  Limited ROM with pain on extension and lateral rotation.  Spurling is negative bilaterally.  Back: Straight leg raising in the sitting and supine positions is negative to radicular pain.  Positive tenderness to palpation over bilateral lumbar facet joints.  Limited ROM on flexion and extension with pain.  Positive facet loading bilaterally.  Extremities: Peripheral joint ROM is full and pain free without obvious instability or laxity in all four extremities. No deformities, edema, or skin discoloration. Good capillary refill.  Musculoskeletal: Bilateral upper and lower extremity strength is normal and symmetric.  No atrophy or tone abnormalities are noted.  Neuro: Bilateral upper and lower extremity coordination and muscle stretch reflexes are physiologic and symmetric.  Plantar response are downgoing.  Decreased sensation to BLE.  Gait: Antalgic- ambulates without assistance.    ASSESSMENT: 80 y.o. year old female with chronic lower back pain and leg pain consistent with the following diagnoses:    1. Cervical spondylosis     2. DDD (degenerative disc disease), cervical     3. Myofascial pain  methocarbamol (ROBAXIN) 500 MG Tab   4. Lumbar radiculopathy     5. Lumbar spondylosis     6. DDD (degenerative disc disease), lumbar     7. Sacroiliac joint pain     8. Chronic pain disorder           PLAN:     - Previous imaging was reviewed and discussed with the patient today.    - Schedule for bilateral C4,5,6 MBB.     - If significant relief, we can proceed with RFA one side at a time.     - I have stressed the importance of physical activity and a home exercise plan to help with pain and improve health.    - Discontinue Zanaflex. Trial Robaxin 500 mg TID.     - RTC in 2 weeks after above procedure.     - Counseled patient regarding the importance of physical therapy.    - Dr. Ravi was consulted on the patient and agrees  with this plan.    The above plan and management options were discussed at length with patient. Patient is in agreement with the above and verbalized understanding.     Clover Kirby  03/04/2020

## 2020-03-11 ENCOUNTER — OFFICE VISIT (OUTPATIENT)
Dept: INTERNAL MEDICINE | Facility: CLINIC | Age: 80
End: 2020-03-11
Payer: MEDICARE

## 2020-03-11 ENCOUNTER — LAB VISIT (OUTPATIENT)
Dept: LAB | Facility: HOSPITAL | Age: 80
End: 2020-03-11
Attending: INTERNAL MEDICINE
Payer: MEDICARE

## 2020-03-11 VITALS
SYSTOLIC BLOOD PRESSURE: 120 MMHG | DIASTOLIC BLOOD PRESSURE: 70 MMHG | HEIGHT: 68 IN | BODY MASS INDEX: 32.99 KG/M2 | HEART RATE: 70 BPM | OXYGEN SATURATION: 97 %

## 2020-03-11 DIAGNOSIS — I10 ESSENTIAL HYPERTENSION: Chronic | ICD-10-CM

## 2020-03-11 DIAGNOSIS — N18.30 TYPE 2 DIABETES MELLITUS WITH STAGE 3 CHRONIC KIDNEY DISEASE, WITH LONG-TERM CURRENT USE OF INSULIN: ICD-10-CM

## 2020-03-11 DIAGNOSIS — E78.00 HYPERCHOLESTEROLEMIA: ICD-10-CM

## 2020-03-11 DIAGNOSIS — M10.9 GOUT, ARTHRITIS: ICD-10-CM

## 2020-03-11 DIAGNOSIS — E11.22 TYPE 2 DIABETES MELLITUS WITH STAGE 3 CHRONIC KIDNEY DISEASE, WITH LONG-TERM CURRENT USE OF INSULIN: ICD-10-CM

## 2020-03-11 DIAGNOSIS — M47.819 FACET ARTHROPATHY: ICD-10-CM

## 2020-03-11 DIAGNOSIS — G47.33 OSA ON CPAP: Chronic | ICD-10-CM

## 2020-03-11 DIAGNOSIS — E66.9 OBESITY (BMI 30.0-34.9): ICD-10-CM

## 2020-03-11 DIAGNOSIS — I70.0 AORTIC ATHEROSCLEROSIS: ICD-10-CM

## 2020-03-11 DIAGNOSIS — E13.9 DIABETES MELLITUS DUE TO ABNORMAL INSULIN: ICD-10-CM

## 2020-03-11 DIAGNOSIS — K21.9 GASTROESOPHAGEAL REFLUX DISEASE WITHOUT ESOPHAGITIS: ICD-10-CM

## 2020-03-11 DIAGNOSIS — I25.119 CORONARY ARTERY DISEASE INVOLVING NATIVE CORONARY ARTERY OF NATIVE HEART WITH ANGINA PECTORIS: ICD-10-CM

## 2020-03-11 DIAGNOSIS — E13.9 DIABETES MELLITUS DUE TO ABNORMAL INSULIN: Primary | ICD-10-CM

## 2020-03-11 DIAGNOSIS — Z79.4 TYPE 2 DIABETES MELLITUS WITH STAGE 3 CHRONIC KIDNEY DISEASE, WITH LONG-TERM CURRENT USE OF INSULIN: ICD-10-CM

## 2020-03-11 LAB
ANION GAP SERPL CALC-SCNC: 11 MMOL/L (ref 8–16)
BASOPHILS # BLD AUTO: 0.09 K/UL (ref 0–0.2)
BASOPHILS NFR BLD: 0.7 % (ref 0–1.9)
BUN SERPL-MCNC: 11 MG/DL (ref 8–23)
CALCIUM SERPL-MCNC: 9.5 MG/DL (ref 8.7–10.5)
CHLORIDE SERPL-SCNC: 100 MMOL/L (ref 95–110)
CO2 SERPL-SCNC: 31 MMOL/L (ref 23–29)
CREAT SERPL-MCNC: 1.3 MG/DL (ref 0.5–1.4)
DIFFERENTIAL METHOD: ABNORMAL
EOSINOPHIL # BLD AUTO: 0.3 K/UL (ref 0–0.5)
EOSINOPHIL NFR BLD: 2.6 % (ref 0–8)
ERYTHROCYTE [DISTWIDTH] IN BLOOD BY AUTOMATED COUNT: 13.7 % (ref 11.5–14.5)
EST. GFR  (AFRICAN AMERICAN): 44.8 ML/MIN/1.73 M^2
EST. GFR  (NON AFRICAN AMERICAN): 38.8 ML/MIN/1.73 M^2
ESTIMATED AVG GLUCOSE: 255 MG/DL (ref 68–131)
GLUCOSE SERPL-MCNC: 270 MG/DL (ref 70–110)
HBA1C MFR BLD HPLC: 10.5 % (ref 4–5.6)
HCT VFR BLD AUTO: 41.8 % (ref 37–48.5)
HGB BLD-MCNC: 12.8 G/DL (ref 12–16)
IMM GRANULOCYTES # BLD AUTO: 0.1 K/UL (ref 0–0.04)
IMM GRANULOCYTES NFR BLD AUTO: 0.8 % (ref 0–0.5)
LYMPHOCYTES # BLD AUTO: 2.6 K/UL (ref 1–4.8)
LYMPHOCYTES NFR BLD: 21.1 % (ref 18–48)
MCH RBC QN AUTO: 28.3 PG (ref 27–31)
MCHC RBC AUTO-ENTMCNC: 30.6 G/DL (ref 32–36)
MCV RBC AUTO: 93 FL (ref 82–98)
MONOCYTES # BLD AUTO: 0.9 K/UL (ref 0.3–1)
MONOCYTES NFR BLD: 7.2 % (ref 4–15)
NEUTROPHILS # BLD AUTO: 8.2 K/UL (ref 1.8–7.7)
NEUTROPHILS NFR BLD: 67.6 % (ref 38–73)
NRBC BLD-RTO: 0 /100 WBC
PLATELET # BLD AUTO: 269 K/UL (ref 150–350)
PMV BLD AUTO: 11.2 FL (ref 9.2–12.9)
POTASSIUM SERPL-SCNC: 3.8 MMOL/L (ref 3.5–5.1)
RBC # BLD AUTO: 4.52 M/UL (ref 4–5.4)
SODIUM SERPL-SCNC: 142 MMOL/L (ref 136–145)
WBC # BLD AUTO: 12.12 K/UL (ref 3.9–12.7)

## 2020-03-11 PROCEDURE — 99999 PR PBB SHADOW E&M-EST. PATIENT-LVL II: ICD-10-PCS | Mod: PBBFAC,HCNC,, | Performed by: INTERNAL MEDICINE

## 2020-03-11 PROCEDURE — 1159F PR MEDICATION LIST DOCUMENTED IN MEDICAL RECORD: ICD-10-PCS | Mod: HCNC,S$GLB,, | Performed by: INTERNAL MEDICINE

## 2020-03-11 PROCEDURE — 99999 PR PBB SHADOW E&M-EST. PATIENT-LVL II: CPT | Mod: PBBFAC,HCNC,, | Performed by: INTERNAL MEDICINE

## 2020-03-11 PROCEDURE — 1101F PR PT FALLS ASSESS DOC 0-1 FALLS W/OUT INJ PAST YR: ICD-10-PCS | Mod: HCNC,CPTII,S$GLB, | Performed by: INTERNAL MEDICINE

## 2020-03-11 PROCEDURE — 99214 OFFICE O/P EST MOD 30 MIN: CPT | Mod: HCNC,S$GLB,, | Performed by: INTERNAL MEDICINE

## 2020-03-11 PROCEDURE — 1101F PT FALLS ASSESS-DOCD LE1/YR: CPT | Mod: HCNC,CPTII,S$GLB, | Performed by: INTERNAL MEDICINE

## 2020-03-11 PROCEDURE — 3074F PR MOST RECENT SYSTOLIC BLOOD PRESSURE < 130 MM HG: ICD-10-PCS | Mod: HCNC,CPTII,S$GLB, | Performed by: INTERNAL MEDICINE

## 2020-03-11 PROCEDURE — 3078F DIAST BP <80 MM HG: CPT | Mod: HCNC,CPTII,S$GLB, | Performed by: INTERNAL MEDICINE

## 2020-03-11 PROCEDURE — 3074F SYST BP LT 130 MM HG: CPT | Mod: HCNC,CPTII,S$GLB, | Performed by: INTERNAL MEDICINE

## 2020-03-11 PROCEDURE — 80048 BASIC METABOLIC PNL TOTAL CA: CPT | Mod: HCNC

## 2020-03-11 PROCEDURE — 1159F MED LIST DOCD IN RCRD: CPT | Mod: HCNC,S$GLB,, | Performed by: INTERNAL MEDICINE

## 2020-03-11 PROCEDURE — 3078F PR MOST RECENT DIASTOLIC BLOOD PRESSURE < 80 MM HG: ICD-10-PCS | Mod: HCNC,CPTII,S$GLB, | Performed by: INTERNAL MEDICINE

## 2020-03-11 PROCEDURE — 36415 COLL VENOUS BLD VENIPUNCTURE: CPT | Mod: HCNC

## 2020-03-11 PROCEDURE — 3052F PR MOST RECENT HEMOGLOBIN A1C LEVEL 8.0 - < 9.0%: ICD-10-PCS | Mod: HCNC,CPTII,S$GLB, | Performed by: INTERNAL MEDICINE

## 2020-03-11 PROCEDURE — 83036 HEMOGLOBIN GLYCOSYLATED A1C: CPT | Mod: HCNC

## 2020-03-11 PROCEDURE — 3052F HG A1C>EQUAL 8.0%<EQUAL 9.0%: CPT | Mod: HCNC,CPTII,S$GLB, | Performed by: INTERNAL MEDICINE

## 2020-03-11 PROCEDURE — 85025 COMPLETE CBC W/AUTO DIFF WBC: CPT | Mod: HCNC

## 2020-03-11 PROCEDURE — 99214 PR OFFICE/OUTPT VISIT, EST, LEVL IV, 30-39 MIN: ICD-10-PCS | Mod: HCNC,S$GLB,, | Performed by: INTERNAL MEDICINE

## 2020-03-11 RX ORDER — PANTOPRAZOLE SODIUM 40 MG/1
TABLET, DELAYED RELEASE ORAL
Qty: 180 TABLET | Refills: 4 | Status: SHIPPED | OUTPATIENT
Start: 2020-03-11 | End: 2020-05-15 | Stop reason: SDUPTHER

## 2020-03-11 RX ORDER — OXYCODONE AND ACETAMINOPHEN 10; 325 MG/1; MG/1
TABLET ORAL
Qty: 150 TABLET | Refills: 0 | Status: SHIPPED | OUTPATIENT
Start: 2020-03-11 | End: 2020-04-07 | Stop reason: SDUPTHER

## 2020-03-11 NOTE — PROGRESS NOTES
CHIEF COMPLAINT: Follow up of multiple issues     HISTORY OF PRESENT ILLNESS: This is a 80-year-old woman who presents for follow up of above     NO syncope.   She is occasionally dizzy. She takes meclizine 12.5 mg at bedtime which helps the dizziness.      She is taking amlodipine 5 mg daily and coreg 25 mg twice daily and lasix 40 mg twice daily for her hypertensino and diastolic dysfunction.      Left knee bothers her at times.  Right knee is fine now.      No chest pain, palpitations, shortness of breath.      She has been out of pantoprazole 40 mg. She has had some epigastric pain.  Food is not getting stuck in her throat. Occasional nausea.  She continues to have intermittent abdominal cramping. No vomiting, constipation, diarrhea.  Occasional soft or watery stools.       She is not taking allopurinol 100 mg daily for gout.        She is now taking duloxetine 60 mg twice daily. Mood is doing better      She takes tizanidine 4 mg at bedtime for muscle spasm of her back. She continues to have back pain. She is taking Oxycodone apap 10/325 3-5 times daily which helps her back. She had an epidural with pain management on 9/17/19.  She is having some restless legs at night. She takes roprinole at bedtime     She is taking Lantus 60 units in the evening and Novolog 23 units once or twice daily with meals.  No hypoglycemia.  Averages 130-140. No hypoglycemia      PAST MEDICAL HISTORY:   1. Diabetes mellitus   2. Hypertension   3. Hyperlipidemia   4. Left heart catheterization January 2007 which revealed luminal irregularities in the LAD, left circumflex and right coronary artery. She had diastolic dysfunction and patent renal arteries.   5. Sleep apnea   6. Obesity.   7. Nephrolithiasis status post lithotripsy.   8. Reflux - had nonerosive gastropathy on EGD September 2007. Gastritis on EGD 9/2010  9. Hidradenitis suppurativa.   10. History of diverticulitis with a hospitalized October 2007.   11. Migraines   12.  "Obesity.   13. 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    14. Total hysterectomy in 1969.   15. Cholecystectomy was done at that time as well.   16. 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   17. Colon polyps on colonscopy 11/2010 - due 2013   18. Hospitalization 12/10 for e coli sepsis due to left ureteral stone with left nephrostomy tube in [place]   19. Left knee replacement 9/2012      MEDICATIONS and ALLERGIES: Updated on EPIC.     PHYSICAL EXAMINATION:    /70   Pulse 70   Ht 5' 8" (1.727 m)   LMP  (LMP Unknown)   SpO2 97%   BMI 32.99 kg/m²     GENERAL: She is alert, oriented, no apparent distress. Affect within   normal limits.  Conjunctiva anicteric. . Oropharynx clear. TM clear   NECK: Supple.   Respiratory: Effort normal. Lungs clear  HEART: Regular rate and rhythm without murmurs, gallops or rubs.   No lower extremity edema.    ABDOMEN: soft, non distended, bowel sounds present, no hepatosplenomgaly. Left lower quadrant tenderness. No rebound or guarding           ASSESSMENT AND PLAN:   1.  Vertigo - stable on meclizine 12. 5 mg nightly  2. Gout - allopurinol 100 mg daily.   2.  Mood disorder - stable  duloxetine to 60 mg twice daily  3.  Neck, shoulder and back pain -follow up with [facility].  4. Asthma - stable   6. HTN -stable  7. Gerd -get back on pantoprazole 40 mg twice daily -   8. Diabetes with neuropathy- watch sugars- labs  9. Hyperlipidemia - off lipitor  10. Allergic rhinitis - stable  11. Diastolic congestive heart failure - stable  12. CRI -stable  13. Obesity - discussed diet, exercise and weight loss  14. CAD -risk factor modification  15. Aortic atherosclerosis and possible mesenteric artery stenosis- risk factor modification  16. Tortuous aorta - HTN controlled  17. Colon polyps - Colonoscopy in 8/2013 - 2 polyps. Flex sig 11/2013 - mild granular mucosa. Colonoscopy 6/2017 " diverticulosis - no polyps due 2023.   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 .Restless leg syndrome - on roprinole    .  MMG 5/19  Prevnar 10/15 and flu shot 11/16  I will see her back in 8 weeks sooner if issues. .

## 2020-03-20 ENCOUNTER — TELEPHONE (OUTPATIENT)
Dept: INTERNAL MEDICINE | Facility: CLINIC | Age: 80
End: 2020-03-20

## 2020-03-20 DIAGNOSIS — E13.9 DIABETES MELLITUS DUE TO ABNORMAL INSULIN: Primary | ICD-10-CM

## 2020-03-20 RX ORDER — LANCETS
EACH MISCELLANEOUS
Qty: 400 EACH | Refills: 4 | Status: SHIPPED | OUTPATIENT
Start: 2020-03-20 | End: 2020-03-23 | Stop reason: SDUPTHER

## 2020-03-23 ENCOUNTER — TELEPHONE (OUTPATIENT)
Dept: ADMINISTRATIVE | Facility: OTHER | Age: 80
End: 2020-03-23

## 2020-03-23 ENCOUNTER — DOCUMENTATION ONLY (OUTPATIENT)
Dept: REHABILITATION | Facility: OTHER | Age: 80
End: 2020-03-23

## 2020-03-23 RX ORDER — LANCETS
EACH MISCELLANEOUS
Qty: 400 EACH | Refills: 4 | Status: SHIPPED | OUTPATIENT
Start: 2020-03-23 | End: 2021-02-09 | Stop reason: SDUPTHER

## 2020-03-23 NOTE — TELEPHONE ENCOUNTER
----- Message from Jeannine Arriaga MD sent at 3/22/2020  9:32 PM CDT -----  Please notiyf pt  Your blood sugars have been higher, averaging 255 - work on your diet.    Jeannine Arriaga M.D.

## 2020-03-23 NOTE — PROGRESS NOTES
Outpatient Physical Therapy Discharge Summary    Date: 03/23/2020      Date of PT eval: 12/10/2019  Number of PT visits: 2  Date of last visit: 12/16/2019    Assessment:  Unable to assess if goals met secondary to lack of attendance.       Plan: Discharge from outpatient physical therapy due to lack of attendance

## 2020-03-25 ENCOUNTER — TELEPHONE (OUTPATIENT)
Dept: PAIN MEDICINE | Facility: CLINIC | Age: 80
End: 2020-03-25

## 2020-03-25 NOTE — TELEPHONE ENCOUNTER
Spoke with patient to reschedule 04/03/2020 appointment with the nurse practitioner to a phone visit with Dr Ravi for 04/03/2020, patient verbalized an understanding

## 2020-03-29 ENCOUNTER — DOCUMENTATION ONLY (OUTPATIENT)
Dept: PRIMARY CARE CLINIC | Facility: CLINIC | Age: 80
End: 2020-03-29

## 2020-03-30 NOTE — PROGRESS NOTES
[facility] pharmacist performed a medication reconciliation on the patient's controlled substances and psychotropic medications using the [place]  and [employer] prescription medication fills portal.    Patient currently filling opioids on time from one provider (Dr. Arriaga) and is deemed to be at low risk of overdose per  records. Current regimen of oxycodone-APAP  mg five times daily (75 morphine equivalents per day).  Co-prescription of naloxone is currently recommended as well as continual monitoring and assessment of additional risk factors for opioid overdose.    Patient has filled >3 opioid prescriptions in the past 4 month, and therefore meets the chronic opioid criteria for enrollment in the program.    Patient is currently filling their psychotropic medications on time per [employer] reports.  Current regimen of duloxetine 60 mg twice daily started on 4/16/19. No other antidepressants filled in the past two years per [employer] reports.  [facility] pharmacist has not confirmed adherence by means of contacting the patient.  Follow up warranted on adherence to this regimen.    Recommendations for PCP:  1.  Co-prescription of Narcan 4 mg nasal spray is recommended at this time due to morphine equivalency in elderly patient  2.  Current regimen of duloxetine 60 mg twice daily.  [facility] pharmacist will determine his role in treatment following patient's intake appointment with [facility] LCSW.      Eber Ramirez, PharmD, BCPP  [facility] Pharmacist

## 2020-03-31 ENCOUNTER — TELEPHONE (OUTPATIENT)
Dept: PRIMARY CARE CLINIC | Facility: CLINIC | Age: 80
End: 2020-03-31

## 2020-04-02 ENCOUNTER — TELEPHONE (OUTPATIENT)
Dept: PAIN MEDICINE | Facility: CLINIC | Age: 80
End: 2020-04-02

## 2020-04-02 NOTE — TELEPHONE ENCOUNTER
Spoke with patient to speak with her about setting up [facility] patient expressed that she does not have access to any smart device and wants to keep her telephone call with Dr. Ravi

## 2020-04-03 ENCOUNTER — TELEPHONE (OUTPATIENT)
Dept: PAIN MEDICINE | Facility: CLINIC | Age: 80
End: 2020-04-03

## 2020-04-03 ENCOUNTER — TELEPHONE (OUTPATIENT)
Dept: PRIMARY CARE CLINIC | Facility: CLINIC | Age: 80
End: 2020-04-03

## 2020-04-03 ENCOUNTER — OFFICE VISIT (OUTPATIENT)
Dept: PAIN MEDICINE | Facility: CLINIC | Age: 80
End: 2020-04-03
Payer: MEDICARE

## 2020-04-03 DIAGNOSIS — G89.29 OTHER CHRONIC PAIN: ICD-10-CM

## 2020-04-03 DIAGNOSIS — M48.062 SPINAL STENOSIS OF LUMBAR REGION WITH NEUROGENIC CLAUDICATION: Primary | ICD-10-CM

## 2020-04-03 PROCEDURE — 99441 PR PHYSICIAN TELEPHONE EVALUATION 5-10 MIN: CPT | Mod: HCNC,95,, | Performed by: ANESTHESIOLOGY

## 2020-04-03 PROCEDURE — 99441 PR PHYSICIAN TELEPHONE EVALUATION 5-10 MIN: ICD-10-PCS | Mod: HCNC,95,, | Performed by: ANESTHESIOLOGY

## 2020-04-03 NOTE — PROGRESS NOTES
**This encounter was carried out via telephone to avoid COVID-19 exposure. Patient opted for a telephone call per his/her preference. This telephone encounter is not associated to an office visit.**     Follow up is for neck pain. She was scheduled for C4, C5, C6 MBB on 3/20/20 but this had to be canceled for now due to the covid epidemic. At last visit her tizanidine prescription was changed to robaxan. She says her pain is tolerable with robaxan and Biofreeze which she purchased over-the-counter.  Denies any numbness, weakness, bowel/bladder changes.      Plan:     - We will reschedule her for Bilateral C4, 5, 6 MBB when we resume procedures     - I have stressed the importance of physical activity and a home exercise plan to help with pain and improve health.     - Patient can continue with medications for now since they are providing benefits, using them appropriately, and without side effects.     - Continue Robaxan 500 mg tid prn     - Told patient she may call the clinic if her pain worsens before scheduled follow up     - Follow up in 1 Month     Ramu Baird MD  [facility]    I have personally reviewed the phone encounter with resident/fellow/NP and personally spoke with patient for over 5 min after addressing all questions and concerns. The phone call was initiated by patient who consented and verbalized understanding to the type of encounter not related to any office visit or other encounter in the past 7 days.    Oscar Ravi MD   4/3/2020

## 2020-04-07 RX ORDER — OXYCODONE AND ACETAMINOPHEN 10; 325 MG/1; MG/1
TABLET ORAL
Qty: 150 TABLET | Refills: 0 | Status: SHIPPED | OUTPATIENT
Start: 2020-04-07 | End: 2020-05-05 | Stop reason: SDUPTHER

## 2020-04-07 NOTE — TELEPHONE ENCOUNTER
----- Message from Gisella Velázquez sent at 4/7/2020 10:24 AM CDT -----  Contact: Self   Requesting an RX refill or new RX.  Is this a refill or new RX:    RX name and strength: oxyCODONE-acetaminophen (PERCOCET)  mg per tablet  Directions (copy/paste from chart):   Take one tablet by mouth 5 times daily  Is this a 30 day or 90 day RX:    Local pharmacy or mail order pharmacy:  local  Pharmacy name and phone # (copy/paste from chart):   [facility] [phone] (Phone)  [phone] (Fax)  Comments:

## 2020-04-09 ENCOUNTER — TELEPHONE (OUTPATIENT)
Dept: PRIMARY CARE CLINIC | Facility: CLINIC | Age: 80
End: 2020-04-09

## 2020-04-09 NOTE — PROGRESS NOTES
Behavioral Health Community Health Worker  Initial Assessment  Completed by:  Zulema Mckeon    Date:  4/9/2020    Patient Enrollment in Behavioral Health Program:  · Patient verbalized understanding of Behavioral Health Integration services to include:  · Patient understands that CHW, LCSW, PharmD and consulting Psychiatrist are members of the care team working collaboratively with his/her primary care provider: Yes  · Patient understands that activation of their [facility] patient portal account is required for accessing the full scope of team services: Yes  · Patient understands that some counseling sessions may occur via video: Yes  · Patient understands that services will be provided by the CHW, LCSW, and PharmD at no charge for a LIMITED TIME: Yes  · Clinic visits with the psychiatrist may be subject to a co-pay based on your insurance: Yes  · Patient consents to enroll in BHI program: Yes    Assessments     Single Item Health Literacy Scale:  · How often do you need to have someone help you read instructions, pamphlets or other written material from your doctor or pharmacy?: Sometimes    Promis 10:  · Promis 10 Responses  · In general, would you say your health is: Fair  · In general, would you say your quality of life is: Good  · In general, how would you rate your physical health?: Fair  · In general, how would you rate your mental health, including your mood and your ability to think?: Good  · In general, how would you rate your satisfaction with your social activities and relationships?: Good  · In general, please rate how well you carry out your usual social activities and roles. (This includes activities at home, at work and in your community, and responsibilities as a parent, child, spouse, employee, friend, etc.): Fair  · To what extent are you able to carry out your everyday physical activities such as walking, climbing stairs, carrying groceries, or moving a chair? : A little  · In the past 7 days, how  often have you been bothered by emotional problems such as feeling anxious, depressed or irritable?: Rarely  · In the past 7 days, how would you rate your fatigue on average?: Moderate  · In the past 7 days, on a scale of 0 to 10 (where 0 is no pain and 10 is the worst pain imaginable) how would you rate your pain on average?: pain score 7-9  · Global Physical Health: 9  · Global Mental health Score: 13    Depression PHQ:  PHQ9 2020   Total Score 9         Generalized Anxiety Disorder 7-Item Scale:  GAD7 2020   1. Feeling nervous, anxious, or on edge? 0   2. Not being able to stop or control worrying? 2   3. Worrying too much about different things? 0   4. Trouble relaxing? 0   5. Being so restless that it is hard to sit still? 1   6. Becoming easily annoyed or irritable? 0   7. Feeling afraid as if something awful might happen? 0   ADRIAN-7 Score 3       History     Social History     Socioeconomic History    Marital status: Single     Spouse name: Not on file    Number of children: 5    Years of education: Not on file    Highest education level: Not on file   Occupational History    Not on file   Social Needs    Financial resource strain: Not on file    Food insecurity:     Worry: Not on file     Inability: Not on file    Transportation needs:     Medical: Not on file     Non-medical: Not on file   Tobacco Use    Smoking status: Former Smoker     Packs/day: 1.00     Years: 15.00     Pack years: 15.00     Types: Cigarettes     Last attempt to quit: 2000     Years since quittin.7    Smokeless tobacco: Never Used   Substance and Sexual Activity    Alcohol use: No    Drug use: No    Sexual activity: Never   Lifestyle    Physical activity:     Days per week: Not on file     Minutes per session: Not on file    Stress: Not on file   Relationships    Social connections:     Talks on phone: Not on file     Gets together: Not on file     Attends Sikh service: Not on file     Active member  "of club or organization: Not on file     Attends meetings of clubs or organizations: Not on file     Relationship status: Not on file   Other Topics Concern    Are you pregnant or think you may be? Not Asked    Breast-feeding Not Asked   Social History Narrative    Single with 5 children. Lives alone. Retired [employer].       Call Summary     ISSA Bernstein screened Ms Villasenor for I Opioid program eligibility on April 9,2020  Pt was  referred by  Jeannine Arriaga MD or referring provider.   The PHQ9  of "9"" and GAD7  of "3" did not meet threshold for enrollment.   Pt notified that we would  Follow-up with Dr. Jeannine Arriaga, PCP.  "

## 2020-04-22 ENCOUNTER — TELEPHONE (OUTPATIENT)
Dept: PAIN MEDICINE | Facility: OTHER | Age: 80
End: 2020-04-22

## 2020-04-22 DIAGNOSIS — Z53.8 PROCEDURE/TEST/EXAM NOT INDICATED: Primary | ICD-10-CM

## 2020-04-29 ENCOUNTER — OFFICE VISIT (OUTPATIENT)
Dept: INTERNAL MEDICINE | Facility: CLINIC | Age: 80
End: 2020-04-29
Payer: MEDICARE

## 2020-04-29 ENCOUNTER — TELEPHONE (OUTPATIENT)
Dept: INTERNAL MEDICINE | Facility: CLINIC | Age: 80
End: 2020-04-29

## 2020-04-29 DIAGNOSIS — R05.9 COUGH: ICD-10-CM

## 2020-04-29 DIAGNOSIS — J02.9 SORE THROAT: Primary | ICD-10-CM

## 2020-04-29 PROCEDURE — 99441 PR PHYSICIAN TELEPHONE EVALUATION 5-10 MIN: CPT | Mod: HCNC,95,, | Performed by: PHYSICIAN ASSISTANT

## 2020-04-29 PROCEDURE — 1101F PR PT FALLS ASSESS DOC 0-1 FALLS W/OUT INJ PAST YR: ICD-10-PCS | Mod: HCNC,CPTII,95, | Performed by: PHYSICIAN ASSISTANT

## 2020-04-29 PROCEDURE — 1159F MED LIST DOCD IN RCRD: CPT | Mod: HCNC,95,, | Performed by: PHYSICIAN ASSISTANT

## 2020-04-29 PROCEDURE — 99441 PR PHYSICIAN TELEPHONE EVALUATION 5-10 MIN: ICD-10-PCS | Mod: HCNC,95,, | Performed by: PHYSICIAN ASSISTANT

## 2020-04-29 PROCEDURE — 1101F PT FALLS ASSESS-DOCD LE1/YR: CPT | Mod: HCNC,CPTII,95, | Performed by: PHYSICIAN ASSISTANT

## 2020-04-29 PROCEDURE — 1159F PR MEDICATION LIST DOCUMENTED IN MEDICAL RECORD: ICD-10-PCS | Mod: HCNC,95,, | Performed by: PHYSICIAN ASSISTANT

## 2020-04-29 RX ORDER — PROMETHAZINE HYDROCHLORIDE AND DEXTROMETHORPHAN HYDROBROMIDE 6.25; 15 MG/5ML; MG/5ML
5 SYRUP ORAL NIGHTLY PRN
Qty: 118 ML | Refills: 0 | Status: SHIPPED | OUTPATIENT
Start: 2020-04-29 | End: 2020-05-09

## 2020-04-29 RX ORDER — AMOXICILLIN 500 MG/1
500 CAPSULE ORAL EVERY 12 HOURS
Qty: 20 CAPSULE | Refills: 0 | Status: SHIPPED | OUTPATIENT
Start: 2020-04-29 | End: 2020-06-15

## 2020-04-29 NOTE — PATIENT INSTRUCTIONS
Important Phone Numbers    [facility] COVID-19 Hotline [phone]    For general information, dial the [place] hotline, 211, or text the keyword LACOVID to [phone]    [facility] [phone]    If you are experiencing mild or severe respiratory illness with fever, cough, difficulty breathing and risk of exposure to COVID-19, our staff at the following locations are available to see you:  [facility]  [address] [place] [address]   Phone: [phone]     [facility]  [address], [place] [address]   Phone: [phone]     [facility]  [address] [address]   Phone: [phone]     Instructions for Patients with Confirmed or Suspected COVID-19    If you are awaiting your test result, you will either be called or it will be released to the patient portal.  If you have any questions about your test, please visit [email or web address] or call our COVID-19 information line at [phone].       Stay home and stay away from family members and friends. The CDC says, you can leave home after these three things have happened: 1) You have had no fever for at least 72 hours (that is three full days of no fever without the use of medicine that reduces fevers) 2) AND other symptoms have improved (for example, when your cough or shortness of breath have improved) 3) AND at least 7 days have passed since your symptoms first appeared.   Separate yourself from other people and animals in your home.   Call ahead before visiting your doctor.   Wear a facemask.   Cover your coughs and sneezes.   Wash your hands often with soap and water; hand  can be used, too.   Avoid sharing personal household items.   Wipe down surfaces used daily.   Monitor your symptoms. Seek prompt medical attention if your illness is worsening (e.g., difficulty breathing).    Before seeking care, call your healthcare  provider.   If you have a medical emergency and need to call 911, notify the dispatch personnel that you have, or are being evaluated for COVID-19. If possible, put on a facemask before emergency medical services arrive.        Recommended precautions for household members, intimate partners, and caregivers in a home setting of a patient with symptomatic laboratory-confirmed COVID-19 or a patient under investigation.  Household members, intimate partners, and caregivers in the home setting awaiting tests results have close contact with a person with symptomatic, laboratory-confirmed COVID-19 or a person under investigation. Close contacts should monitor their health; they should call their provider right away if they develop symptoms suggestive of COVID-19 (e.g., fever, cough, shortness of breath).    Close contacts should also follow these recommendations:   Make sure that you understand and can help the patient follow their provider's instructions for medication(s) and care. You should help the patient with basic needs in the home and provide support for getting groceries, prescriptions, and other personal needs.   Monitor the patient's symptoms. If the patient is getting sicker, call his or her healthcare provider and tell them that the patient has laboratory-confirmed COVID-19. If the patient has a medical emergency and you need to call 911, notify the dispatch personnel that the patient has, or is being evaluated for COVID-19.   Household members should stay in another room or be  from the patient. Household members should use a separate bedroom and bathroom, if available.   Prohibit visitors.   Household members should care for any pets in the home.   Make sure that shared spaces in the home have good air flow, such as by an air conditioner or an opened window, weather permitting.   Perform hand hygiene frequently. Wash your hands often with soap and water for at least 20 seconds or use an  alcohol-based hand  (that contains > 60% alcohol) covering all surfaces of your hands and rubbing them together until they feel dry. Soap and water should be used preferentially.   Avoid touching your eyes, nose, and mouth.   The patient should wear a facemask. If the patient is not able to wear a facemask (for example, because it causes trouble breathing), caregivers should wear a mask when they are in the same room as the patient.   Wear a disposable facemask and gloves when you touch or have contact with the patient's blood, stool, or body fluids, such as saliva, sputum, nasal mucus, vomit, urine.  o Throw out disposable facemasks and gloves after using them. Do not reuse.  o When removing personal protective equipment, first remove and dispose of gloves. Then, immediately clean your hands with soap and water or alcohol-based hand . Next, remove and dispose of facemask, and immediately clean your hands again with soap and water or alcohol-based hand .   You should not share dishes, drinking glasses, cups, eating utensils, towels, bedding, or other items with the patient. After the patient uses these items, you should wash them thoroughly (see below Wash laundry thoroughly).   Clean all high-touch surfaces, such as counters, tabletops, doorknobs, bathroom fixtures, toilets, phones, keyboards, tablets, and bedside tables, every day. Also, clean any surfaces that may have blood, stool, or body fluids on them.   Use a household cleaning spray or wipe, according to the label instructions. Labels contain instructions for safe and effective use of the cleaning product including precautions you should take when applying the product, such as wearing gloves and making sure you have good ventilation during use of the product.   Wash laundry thoroughly.  o Immediately remove and wash clothes or bedding that have blood, stool, or body fluids on them.  o Wear disposable gloves while  handling soiled items and keep soiled items away from your body. Clean your hands (with soap and water or an alcohol-based hand ) immediately after removing your gloves.  o Read and follow directions on labels of laundry or clothing items and detergent. In general, using a normal laundry detergent according to washing machine instructions and dry thoroughly using the warmest temperatures recommended on the clothing label.   Place all used disposable gloves, facemasks, and other contaminated items in a lined container before disposing of them with other household waste. Clean your hands (with soap and water or an alcohol-based hand ) immediately after handling these items. Soap and water should be used preferentially if hands are visibly dirty.   Discuss any additional questions with your state or local health department or healthcare provider. Check available hours when contacting your local health department.    For more information see CDC link below.      https://www.cdc.gov/coronavirus/2019-ncov/hcp/guidance-prevent-spread.html#precautions        Sources:  [facility], [place] Department of Health and Hospitals      Self-Care for Sore Throats    Sore throats happen for many reasons, such as colds, allergies, and infections caused by viruses or bacteria. In any case, your throat becomes red and sore. Your goal for self-care is to reduce your discomfort while giving your throat a chance to heal.  Moisten and soothe your throat  Tips include the following:  · Try a sip of water first thing after waking up.  · Keep your throat moist by drinking 6 or more glasses of clear liquids every day.  · Run a cool-air humidifier in your room overnight.  · Avoid cigarette smoke.   · Suck on throat lozenges, cough drops, hard candy, ice chips, or frozen fruit-juice bars. Use the sugar-free versions if your diet or medical condition requires them.  Gargle to ease irritation  Gargling every hour or 2 can ease  irritation. Try gargling with 1 of these solutions:  · 1/4 teaspoon of salt in 1/2 cup of warm water  · An over-the-counter anesthetic gargle  Use medicine for more relief  Over-the-counter medicine can reduce sore throat symptoms. Ask your pharmacist if you have questions about which medicine to use:  · Ease pain with anesthetic sprays. Aspirin or an aspirin substitute also helps. Remember, never give aspirin to anyone 18 or younger, or if you are already taking blood thinners.   · For sore throats caused by allergies, try antihistamines to block the allergic reaction.  · Remember: unless a sore throat is caused by a bacterial infection, antibiotics wont help you.  Prevent future sore throats  Prevention tips include the following:  · Stop smoking or reduce contact with secondhand smoke. Smoke irritates the tender throat lining.  · Limit contact with pets and with allergy-causing substances, such as pollen and mold.  · When youre around someone with a sore throat or cold, wash your hands often to keep viruses or bacteria from spreading.  · Dont strain your vocal cords.  Call your healthcare provider  Contact your healthcare provider if you have:  · A temperature over 101°F (38.3°C)  · White spots on the throat  · Great difficulty swallowing  · Trouble breathing  · A skin rash  · Recent exposure to someone else with strep bacteria  · Severe hoarseness and swollen glands in the neck or jaw   Date Last Reviewed: 8/1/2016  © 3901-5588 [employer]. [address] [place] [address], [place] [address]. All rights reserved. This information is not intended as a substitute for professional medical care. Always follow your healthcare professional's instructions.

## 2020-04-29 NOTE — TELEPHONE ENCOUNTER
----- Message from Clarice Mari sent at 4/29/2020  1:09 PM CDT -----  Contact: self/ [phone]  Would like to get medical advice.  Symptoms (please be specific):  Coughing, sore throat  How long has patient had these symptoms:  3 days  Pharmacy name and phone #:  [id number] - [place] [address] AT [facility] [address] [phone] (Phone)  [phone] (Fax)  Any drug allergies (copy from chart):    See chart  Would the patient rather a call back or a response via [facility]?:  Call back   Comments:

## 2020-04-29 NOTE — TELEPHONE ENCOUNTER
----- Message from Clarice Mari sent at 4/29/2020  1:09 PM CDT -----  Contact: self/ [phone]  Would like to get medical advice.  Symptoms (please be specific):  Coughing, sore throat  How long has patient had these symptoms:  3 days  Pharmacy name and phone #:  [id number] - [place] [address] AT [facility] [address] [phone] (Phone)  [phone] (Fax)  Any drug allergies (copy from chart):    See chart  Would the patient rather a call back or a response via [facility]?:  Call back   Comments:

## 2020-04-29 NOTE — PROGRESS NOTES
"Subjective:       Patient ID: Henrietta Villasenor is a 80 y.o. female.        Chief Complaint: Cough and Sore Throat    Established Patient - Audio Only Telehealth Visit     The patient location is: [place]  The chief complaint leading to consultation is: sore throat, cough  Visit type: Virtual visit with audio only (telephone)     The reason for the audio only service rather than synchronous audio and video virtual visit was related to technical difficulties or patient preference/necessity.     Each patient to whom I provide medical services by telemedicine is:  (1) informed of the relationship between the physician and patient and the respective role of any other health care provider with respect to management of the patient; and (2) notified that they may decline to receive medical services by telemedicine and may withdraw from such care at any time. Patient verbally consented to receive this service via voice-only telephone call.       HPI:   Sore throat, tries to swallow mucus and then coughs, painful to swallow, mucus in throat, cough here and there but not consistent  Sx x 3 days  No fever, chills, sweats, body aches  No shortness of breath or chest pain  Intact taste and smell  No N/V/D/C  "I get this every year"    This service was not originating from a related E/M service provided within the previous 7 days nor will  to an E/M service or procedure within the next 24 hours or my soonest available appointment.  Prevailing standard of care was able to be met in this audio-only visit.           Review of Systems   Constitutional: Negative for chills, diaphoresis, fatigue and fever.   HENT: Positive for sore throat. Negative for congestion.    Eyes: Negative for visual disturbance.   Respiratory: Positive for cough. Negative for chest tightness and shortness of breath.    Cardiovascular: Negative for chest pain, palpitations and leg swelling.   Gastrointestinal: Negative for abdominal pain, blood in stool, " constipation, diarrhea, nausea and vomiting.   Genitourinary: Negative for dysuria, frequency, hematuria and urgency.   Musculoskeletal: Negative for arthralgias and back pain.   Skin: Negative for rash.   Neurological: Negative for dizziness, syncope, weakness and headaches.   Psychiatric/Behavioral: Negative for dysphoric mood and sleep disturbance. The patient is not nervous/anxious.        Objective:      Physical Exam   Constitutional: No distress.   Neurological: She is alert.   Psychiatric:   Speaking in clear full sentences        Assessment:       1. Sore throat    2. Cough        Plan:       Henrietta was seen today for cough and sore throat.    Diagnoses and all orders for this visit:    Sore throat  -     amoxicillin (AMOXIL) 500 MG capsule; Take 1 capsule (500 mg total) by mouth every 12 (twelve) hours.    Cough  -     COVID-19 Routine Screening; Future  -     amoxicillin (AMOXIL) 500 MG capsule; Take 1 capsule (500 mg total) by mouth every 12 (twelve) hours.  -     promethazine-dextromethorphan (PROMETHAZINE-DM) 6.25-15 mg/5 mL Syrp; Take 5 mLs by mouth nightly as needed.    Drink plenty of fluids, get lots of rest, and follow-up poor results.     Patient has a possible COVID-19 Infection.  Signs and symptoms discussed with patient.  Patient educated to self isolate in a room in their home away from others they live with. Mask if available. Patient advised not to leave house for any reason (other than to seek further medical care if that becomes necessary).    Self treatment discussed including tylenol for fever, pain, or myalgia; cough/cold medications for symptoms.  Patient to check temperature daily.  Monitor for symptoms of respiratory distress. To check in daily with our office via myChart or phone with symptoms especially if any new concerns.  If patient needs emergent care to notify EMS or ER about possible COVID-19 infection to allow for proper PPE and isolation.    Pt has been given instructions  "populated from [employer] database and has verbalized understanding of the after visit summary and information contained wherein.    Follow up with a primary care provider. May go to ER for acute shortness of breath, lightheadedness, fever, or any other emergent complaints or changes in condition.    "This note will be shared with the patient"    Future Appointments   Date Time Provider Department Center   5/4/2020  1:00 PM COVID TESTING, PCW [facility] IM Fran Carmen PCW   5/15/2020  1:30 PM Jeannine Arriaga MD [facility] IM [facility] PCW   6/23/2020 10:30 AM Lorna oH DPM [facility] POD Fran Carmen                 "

## 2020-04-30 ENCOUNTER — LAB VISIT (OUTPATIENT)
Dept: INTERNAL MEDICINE | Facility: CLINIC | Age: 80
End: 2020-04-30
Payer: MEDICARE

## 2020-04-30 ENCOUNTER — TELEPHONE (OUTPATIENT)
Dept: INTERNAL MEDICINE | Facility: CLINIC | Age: 80
End: 2020-04-30

## 2020-04-30 DIAGNOSIS — R05.9 COUGH: ICD-10-CM

## 2020-04-30 LAB — SARS-COV-2 RNA RESP QL NAA+PROBE: NOT DETECTED

## 2020-04-30 PROCEDURE — U0002 COVID-19 LAB TEST NON-CDC: HCPCS | Mod: HCNC

## 2020-05-01 NOTE — TELEPHONE ENCOUNTER
Called and left msg for pt regarding her negative test results and the next steps she should take as shared in the note from STERLING Luke.    Hayden Galeas

## 2020-05-04 DIAGNOSIS — Z01.818 PREOP TESTING: Primary | ICD-10-CM

## 2020-05-05 RX ORDER — ROPINIROLE 0.5 MG/1
0.5 TABLET, FILM COATED ORAL NIGHTLY
Qty: 90 TABLET | Refills: 1 | Status: SHIPPED | OUTPATIENT
Start: 2020-05-05 | End: 2020-12-21

## 2020-05-05 RX ORDER — OXYCODONE AND ACETAMINOPHEN 10; 325 MG/1; MG/1
TABLET ORAL
Qty: 150 TABLET | Refills: 0 | Status: SHIPPED | OUTPATIENT
Start: 2020-05-05 | End: 2020-06-05 | Stop reason: SDUPTHER

## 2020-05-05 NOTE — TELEPHONE ENCOUNTER
----- Message from Jennifer Cardenas sent at 5/5/2020 12:06 PM CDT -----  Contact: self/ [phone]  Calling to get test results.  Name of test (lab, xray, etc.):   Covid -19   Date of test:  5/4  Ordering provider: Bart  Where was the test performed:    Would the patient rather a call back or a response via [facility]?:    Comments:      Requesting an RX refill or new RX.  Is this a refill or new RX:    RX name and strength: oxyCODONE-acetaminophen (PERCOCET)  mg per tablet 150 tablet   Directions (copy/paste from chart):Take one tablet by mouth 5 times daily   Quantity prescribed more than 7 day supply? Yes, quantity medically necessary  Is this a 30 day or 90 day RX:    [facility] [phone] (Phone)  [phone] (Fax)  Pharmacy name and phone # (copy/paste from chart):     Comments:

## 2020-05-06 NOTE — PROGRESS NOTES
Refill Routing Note     Medication(s) are not appropriate for processing by [facility]:    Medication Outside of Protocol    Appointments  past 12m or future 3m with PCP    Date Provider   Last Visit   3/11/2020 Jeannine Arriaga MD   Next Visit   5/15/2020 Jeannine Arriaga MD           Automatic Epic Protocol Generated Data:    Requested Prescriptions   Pending Prescriptions Disp Refills    rOPINIRole (REQUIP) 0.5 MG tablet [Pharmacy Med Name: ROPINIROLE HCL 0.5 MG Tablet] 90 tablet 1     Sig: TAKE 1 TABLET (0.5 MG TOTAL) BY MOUTH EVERY EVENING.       There is no refill protocol information for this order           Note composed:9:25 PM 05/05/2020

## 2020-05-15 ENCOUNTER — OFFICE VISIT (OUTPATIENT)
Dept: INTERNAL MEDICINE | Facility: CLINIC | Age: 80
End: 2020-05-15
Payer: MEDICARE

## 2020-05-15 VITALS
HEIGHT: 68 IN | WEIGHT: 224.56 LBS | HEART RATE: 75 BPM | OXYGEN SATURATION: 96 % | DIASTOLIC BLOOD PRESSURE: 80 MMHG | BODY MASS INDEX: 34.03 KG/M2 | SYSTOLIC BLOOD PRESSURE: 118 MMHG

## 2020-05-15 DIAGNOSIS — E13.9 DIABETES MELLITUS DUE TO ABNORMAL INSULIN: ICD-10-CM

## 2020-05-15 DIAGNOSIS — R63.30 FEEDING DIFFICULTIES: ICD-10-CM

## 2020-05-15 DIAGNOSIS — R13.10 SWALLOWING DISORDER: Primary | ICD-10-CM

## 2020-05-15 DIAGNOSIS — I10 ESSENTIAL HYPERTENSION: Chronic | ICD-10-CM

## 2020-05-15 DIAGNOSIS — Z79.4 TYPE 2 DIABETES MELLITUS WITH OTHER CIRCULATORY COMPLICATION, WITH LONG-TERM CURRENT USE OF INSULIN: ICD-10-CM

## 2020-05-15 DIAGNOSIS — E11.42 DIABETIC POLYNEUROPATHY ASSOCIATED WITH TYPE 2 DIABETES MELLITUS: ICD-10-CM

## 2020-05-15 DIAGNOSIS — H81.13 BENIGN PAROXYSMAL POSITIONAL VERTIGO DUE TO BILATERAL VESTIBULAR DISORDER: ICD-10-CM

## 2020-05-15 DIAGNOSIS — I70.0 AORTIC ATHEROSCLEROSIS: ICD-10-CM

## 2020-05-15 DIAGNOSIS — E11.59 TYPE 2 DIABETES MELLITUS WITH OTHER CIRCULATORY COMPLICATION, WITH LONG-TERM CURRENT USE OF INSULIN: ICD-10-CM

## 2020-05-15 PROCEDURE — 3079F DIAST BP 80-89 MM HG: CPT | Mod: HCNC,CPTII,S$GLB, | Performed by: INTERNAL MEDICINE

## 2020-05-15 PROCEDURE — 1159F PR MEDICATION LIST DOCUMENTED IN MEDICAL RECORD: ICD-10-PCS | Mod: HCNC,S$GLB,, | Performed by: INTERNAL MEDICINE

## 2020-05-15 PROCEDURE — 3074F SYST BP LT 130 MM HG: CPT | Mod: HCNC,CPTII,S$GLB, | Performed by: INTERNAL MEDICINE

## 2020-05-15 PROCEDURE — 99214 OFFICE O/P EST MOD 30 MIN: CPT | Mod: HCNC,S$GLB,, | Performed by: INTERNAL MEDICINE

## 2020-05-15 PROCEDURE — 99999 PR PBB SHADOW E&M-EST. PATIENT-LVL III: CPT | Mod: PBBFAC,HCNC,, | Performed by: INTERNAL MEDICINE

## 2020-05-15 PROCEDURE — 1101F PR PT FALLS ASSESS DOC 0-1 FALLS W/OUT INJ PAST YR: ICD-10-PCS | Mod: HCNC,CPTII,S$GLB, | Performed by: INTERNAL MEDICINE

## 2020-05-15 PROCEDURE — 1159F MED LIST DOCD IN RCRD: CPT | Mod: HCNC,S$GLB,, | Performed by: INTERNAL MEDICINE

## 2020-05-15 PROCEDURE — 3079F PR MOST RECENT DIASTOLIC BLOOD PRESSURE 80-89 MM HG: ICD-10-PCS | Mod: HCNC,CPTII,S$GLB, | Performed by: INTERNAL MEDICINE

## 2020-05-15 PROCEDURE — 3074F PR MOST RECENT SYSTOLIC BLOOD PRESSURE < 130 MM HG: ICD-10-PCS | Mod: HCNC,CPTII,S$GLB, | Performed by: INTERNAL MEDICINE

## 2020-05-15 PROCEDURE — 1101F PT FALLS ASSESS-DOCD LE1/YR: CPT | Mod: HCNC,CPTII,S$GLB, | Performed by: INTERNAL MEDICINE

## 2020-05-15 PROCEDURE — 99214 PR OFFICE/OUTPT VISIT, EST, LEVL IV, 30-39 MIN: ICD-10-PCS | Mod: HCNC,S$GLB,, | Performed by: INTERNAL MEDICINE

## 2020-05-15 PROCEDURE — 99999 PR PBB SHADOW E&M-EST. PATIENT-LVL III: ICD-10-PCS | Mod: PBBFAC,HCNC,, | Performed by: INTERNAL MEDICINE

## 2020-05-15 RX ORDER — PANTOPRAZOLE SODIUM 40 MG/1
TABLET, DELAYED RELEASE ORAL
Qty: 180 TABLET | Refills: 4 | Status: SHIPPED | OUTPATIENT
Start: 2020-05-15 | End: 2020-11-13 | Stop reason: SDUPTHER

## 2020-05-15 RX ORDER — INSULIN PUMP SYRINGE, 3 ML
EACH MISCELLANEOUS
Qty: 1 EACH | Refills: 0 | Status: SHIPPED | OUTPATIENT
Start: 2020-05-15 | End: 2021-02-09 | Stop reason: SDUPTHER

## 2020-05-15 RX ORDER — DULOXETIN HYDROCHLORIDE 60 MG/1
60 CAPSULE, DELAYED RELEASE ORAL 2 TIMES DAILY
Qty: 180 CAPSULE | Refills: 3 | Status: SHIPPED | OUTPATIENT
Start: 2020-05-15 | End: 2021-05-12 | Stop reason: SDUPTHER

## 2020-05-15 RX ORDER — CARVEDILOL 25 MG/1
25 TABLET ORAL 2 TIMES DAILY
Qty: 180 TABLET | Refills: 3 | Status: SHIPPED | OUTPATIENT
Start: 2020-05-15 | End: 2021-05-10

## 2020-05-20 ENCOUNTER — TELEPHONE (OUTPATIENT)
Dept: INTERNAL MEDICINE | Facility: CLINIC | Age: 80
End: 2020-05-20

## 2020-05-20 NOTE — TELEPHONE ENCOUNTER
----- Message from Gisella Velázquez sent at 5/20/2020  3:30 PM CDT -----  Contact: self   Would like to get medical advice.  Symptoms (please be specific):  cough  How long has patient had these symptoms:  2 weeks  Pharmacy name and phone #:  [id number] - [place] [id number] AT [facility] [address] [phone] (Phone)  [phone] (Fax)  Any drug allergies (copy from chart):      Would the patient rather a call back or a response via [facility]?:  Call back  Comments:

## 2020-05-21 ENCOUNTER — OFFICE VISIT (OUTPATIENT)
Dept: INTERNAL MEDICINE | Facility: CLINIC | Age: 80
End: 2020-05-21
Payer: MEDICARE

## 2020-05-21 VITALS
BODY MASS INDEX: 33.12 KG/M2 | HEIGHT: 68 IN | WEIGHT: 218.5 LBS | DIASTOLIC BLOOD PRESSURE: 70 MMHG | HEART RATE: 72 BPM | SYSTOLIC BLOOD PRESSURE: 130 MMHG | TEMPERATURE: 98 F

## 2020-05-21 DIAGNOSIS — R05.9 COUGH: Primary | ICD-10-CM

## 2020-05-21 PROCEDURE — 1159F MED LIST DOCD IN RCRD: CPT | Mod: HCNC,S$GLB,, | Performed by: PHYSICIAN ASSISTANT

## 2020-05-21 PROCEDURE — 99999 PR PBB SHADOW E&M-EST. PATIENT-LVL V: CPT | Mod: PBBFAC,HCNC,, | Performed by: PHYSICIAN ASSISTANT

## 2020-05-21 PROCEDURE — 3078F PR MOST RECENT DIASTOLIC BLOOD PRESSURE < 80 MM HG: ICD-10-PCS | Mod: HCNC,CPTII,S$GLB, | Performed by: PHYSICIAN ASSISTANT

## 2020-05-21 PROCEDURE — 3075F PR MOST RECENT SYSTOLIC BLOOD PRESS GE 130-139MM HG: ICD-10-PCS | Mod: HCNC,CPTII,S$GLB, | Performed by: PHYSICIAN ASSISTANT

## 2020-05-21 PROCEDURE — 1126F AMNT PAIN NOTED NONE PRSNT: CPT | Mod: HCNC,S$GLB,, | Performed by: PHYSICIAN ASSISTANT

## 2020-05-21 PROCEDURE — 1159F PR MEDICATION LIST DOCUMENTED IN MEDICAL RECORD: ICD-10-PCS | Mod: HCNC,S$GLB,, | Performed by: PHYSICIAN ASSISTANT

## 2020-05-21 PROCEDURE — 1101F PR PT FALLS ASSESS DOC 0-1 FALLS W/OUT INJ PAST YR: ICD-10-PCS | Mod: HCNC,CPTII,S$GLB, | Performed by: PHYSICIAN ASSISTANT

## 2020-05-21 PROCEDURE — 3078F DIAST BP <80 MM HG: CPT | Mod: HCNC,CPTII,S$GLB, | Performed by: PHYSICIAN ASSISTANT

## 2020-05-21 PROCEDURE — 1101F PT FALLS ASSESS-DOCD LE1/YR: CPT | Mod: HCNC,CPTII,S$GLB, | Performed by: PHYSICIAN ASSISTANT

## 2020-05-21 PROCEDURE — 1126F PR PAIN SEVERITY QUANTIFIED, NO PAIN PRESENT: ICD-10-PCS | Mod: HCNC,S$GLB,, | Performed by: PHYSICIAN ASSISTANT

## 2020-05-21 PROCEDURE — 99999 PR PBB SHADOW E&M-EST. PATIENT-LVL V: ICD-10-PCS | Mod: PBBFAC,HCNC,, | Performed by: PHYSICIAN ASSISTANT

## 2020-05-21 PROCEDURE — 99213 PR OFFICE/OUTPT VISIT, EST, LEVL III, 20-29 MIN: ICD-10-PCS | Mod: HCNC,S$GLB,, | Performed by: PHYSICIAN ASSISTANT

## 2020-05-21 PROCEDURE — 99213 OFFICE O/P EST LOW 20 MIN: CPT | Mod: HCNC,S$GLB,, | Performed by: PHYSICIAN ASSISTANT

## 2020-05-21 PROCEDURE — 3075F SYST BP GE 130 - 139MM HG: CPT | Mod: HCNC,CPTII,S$GLB, | Performed by: PHYSICIAN ASSISTANT

## 2020-05-21 RX ORDER — PROMETHAZINE HYDROCHLORIDE AND DEXTROMETHORPHAN HYDROBROMIDE 6.25; 15 MG/5ML; MG/5ML
5 SYRUP ORAL NIGHTLY PRN
Qty: 180 ML | Refills: 0 | Status: SHIPPED | OUTPATIENT
Start: 2020-05-21 | End: 2020-05-31

## 2020-05-21 NOTE — PROGRESS NOTES
Subjective:       Patient ID: Henrietta Villasenor is a 80 y.o. female.        Chief Complaint: Cough    Henrietta Villasenor is an established patient of Jeannine Arriaga MD here today for urgent care visit.    Cough x 3 weeks, worse at night, only a little coughing during the day, at night she can't sleep because so much coughing, no mucus, no shortness of breath or chest pain, no fever, some post nasal drip at times, throat feels dry, drinks water that helps with the dryness and some with the cough, difficulty swallowing food at times and feels it gets stuck, drinks water to get it down, s/p amoxil with no improvement, cough syrup previously did help, has barium swallow scheduled tomorrow as per kanchan Bassett 19 swab negative           Review of Systems   Constitutional: Negative for chills, diaphoresis, fatigue and fever.   HENT: Positive for postnasal drip. Negative for congestion and sore throat.    Eyes: Negative for visual disturbance.   Respiratory: Positive for cough. Negative for chest tightness and shortness of breath.    Cardiovascular: Negative for chest pain, palpitations and leg swelling.   Gastrointestinal: Negative for abdominal pain, blood in stool, constipation, diarrhea, nausea and vomiting.   Genitourinary: Negative for dysuria, frequency, hematuria and urgency.   Musculoskeletal: Negative for arthralgias and back pain.   Skin: Negative for rash.   Neurological: Negative for dizziness, syncope, weakness and headaches.   Psychiatric/Behavioral: Negative for dysphoric mood and sleep disturbance. The patient is not nervous/anxious.        Objective:      Physical Exam   Constitutional: She appears well-developed and well-nourished.   HENT:   Head: Normocephalic.   Right Ear: External ear normal.   Left Ear: External ear normal.   Mouth/Throat: Oropharynx is clear and moist.   Eyes: Pupils are equal, round, and reactive to light.   Cardiovascular: Normal rate, regular rhythm and normal heart sounds.  "Exam reveals no gallop and no friction rub.   No murmur heard.  Pulmonary/Chest: Effort normal and breath sounds normal. No respiratory distress.   Abdominal: Soft. Normal appearance. There is no tenderness. There is no CVA tenderness.   Musculoskeletal: She exhibits no edema.   Neurological: She is alert.   Skin: Skin is warm and dry.   Psychiatric: She has a normal mood and affect.   Nursing note and vitals reviewed.      Assessment:       1. Cough        Plan:       Henrietta was seen today for cough.    Diagnoses and all orders for this visit:    Cough  -     promethazine-dextromethorphan (PROMETHAZINE-DM) 6.25-15 mg/5 mL Syrp; Take 5 mLs by mouth nightly as needed.  -     X-Ray Chest PA And Lateral; Future    Ongoing cough x 3 weeks - cough syrup previously helped, s/p amoxil with no improvement  Swallowing eval scheduled  Will add CXR  Covid swab negative    F/u with PCP if not resolving    Drink plenty of fluids, get lots of rest, and follow-up poor results    Pt has been given instructions populated from [employer] database and has verbalized understanding of the after visit summary and information contained wherein.    Follow up with a primary care provider. May go to ER for acute shortness of breath, lightheadedness, fever, or any other emergent complaints or changes in condition.    "This note will be shared with the patient"    Future Appointments   Date Time Provider Department Center   5/22/2020  2:30 PM Coco Alexis [facility] SPEECH Fran Carmen   6/19/2020 11:00 AM Jeannine Arriaga MD [facility] IM [facility] Carmen PCW   6/23/2020 10:30 AM Lorna Ho DPM [facility] POD Fran Carmen               "

## 2020-05-21 NOTE — PATIENT INSTRUCTIONS
Complete work up ordered by Dr. Arriaga - will add chest x-ray today - follow up with Dr. Arriaga once work up complete    Cough, Chronic, Uncertain Cause (Adult)    Everyone has had a cough as part of the common cold, flu, or bronchitis. This kind of cough occurs along with an achy feeling, low-grade fever, nasal and sinus congestion, and a scratchy or sore throat. This usually gets better in 2 to 3 weeks. A cough that lasts longer than 3 weeks may be due to other causes.  If your cough does not improve over the next 2 weeks, further testing may be needed. Follow up with your healthcare provider as advised. Cough suppressants may be recommended. Based on your exam today, the exact cause of your cough is not certain. Below are some common causes for persistent cough.  Smokers cough  Smokers cough doesnt go away. If you continue to smoke, it only gets worse. The cough is from irritation in the air passages. Talk to your healthcare provider about quitting. Medicines or nicotine-replacement products, like gum or the patch, may make quitting easier.  Postnasal drip  A cough that is worse at night may be due to postnasal drip. Excess mucus in the nose drains from the back of your nose to your throat. This triggers the cough reflex. Postnasal drip may be due to a sinus infection or allergy. Common allergens include dust, tobacco smoke (both inhaled and secondhand smoke), environmental pollutants, pollen, mold, pets, cleaning agents, room deodorizers, and chemical fumes. Over-the-counter antihistamines or decongestants may be helpful for allergies. A sinus infection may requires antibiotic treatment. See your healthcare provider if symptoms continue.  Medicines  Certain prescribed medicines can cause a chronic cough in some people:  · ACE inhibitors for high blood pressure. These include benazepril, captopril, enalapril, fosinopril, lisinopril, quinapril, ramipril, and others.  · Beta-blockers for high blood  pressure and other conditions. These include propranolol, atenolol, metoprolol, nadolol, and others.  Let your healthcare provider know if you are taking any of these.  Asthma  Cough may be the only sign of mild asthma. You may have tests to find out if asthma is causing your cough. You may also take asthma medicine on a trial basis.  Acid reflux (heartburn, GERD)  The esophagus is the tube that carries food from the mouth to the stomach. A valve at its lower end prevents stomach acids from flowing upward. If this valve does not work properly, acid from the stomach enters the esophagus. This may cause a burning pain in the upper abdomen or lower chest, belching, or cough. Symptoms are often worse when lying flat. Avoid eating or drinking before bedtime. Try using extra pillows to raise your upper body, or place 4-inch blocks under the head of your bed. You may try an over-the-counter antacid or an acid-blocking medicine such as famotidine, cimetidine, ranitidine, esomeprazole, lansoprazole, or omeprazole. Stronger medicines for this condition can be prescribed by your healthcare provider.  Follow-up care  Follow up with your healthcare provider, or as advised, if your cough does not improve. Further testing may be needed.  Note: If an X-ray was taken, a specialist will review it. You will be notified of any new findings that may affect your care.  When to seek medical advice  Call your healthcare provider right away if any of these occur:  · Mild wheezing or difficulty breathing  · Fever of 100.4ºF (38ºC) or higher, or as directed by your healthcare provider  · Unexpected weight loss  · Coughing up large amounts of colored sputum  · Night sweats (sheets and pajamas get soaking wet)  Call 911, or get immediate medical care  Contact emergency services right away if any of these occur:  · Coughing up blood  · Moderate to severe trouble breathing or wheezing  Date Last Reviewed: 9/13/2015  © 4396-2298 [employer]  "[employer]. [address] [place] [address], [place] [address]. All rights reserved. This information is not intended as a substitute for professional medical care. Always follow your healthcare professional's instructions.

## 2020-05-24 NOTE — TELEPHONE ENCOUNTER
Knee x-ray is normal. Follow up with Orthopedics if problems persist.    Urine culture with + E.Col, pt pregnant, needs abx

## 2020-05-26 ENCOUNTER — TELEPHONE (OUTPATIENT)
Dept: PODIATRY | Facility: CLINIC | Age: 80
End: 2020-05-26

## 2020-05-26 NOTE — TELEPHONE ENCOUNTER
Per pt request her appt moved up to June 1      ----- Message from Mary Cardenas sent at 5/26/2020  3:43 PM CDT -----  Contact: Self [phone]  Pt states she would like a sooner apt for podiatry please call back to discuss

## 2020-06-05 ENCOUNTER — TELEPHONE (OUTPATIENT)
Dept: INTERNAL MEDICINE | Facility: CLINIC | Age: 80
End: 2020-06-05

## 2020-06-05 RX ORDER — OXYCODONE AND ACETAMINOPHEN 10; 325 MG/1; MG/1
TABLET ORAL
Qty: 150 TABLET | Refills: 0 | Status: CANCELLED | OUTPATIENT
Start: 2020-06-05

## 2020-06-05 RX ORDER — OXYCODONE AND ACETAMINOPHEN 10; 325 MG/1; MG/1
TABLET ORAL
Qty: 150 TABLET | Refills: 0 | Status: SHIPPED | OUTPATIENT
Start: 2020-06-05 | End: 2020-06-06 | Stop reason: SDUPTHER

## 2020-06-05 RX ORDER — OXYCODONE AND ACETAMINOPHEN 10; 325 MG/1; MG/1
TABLET ORAL
Qty: 150 TABLET | Refills: 0 | Status: SHIPPED | OUTPATIENT
Start: 2020-06-05 | End: 2020-06-05 | Stop reason: SDUPTHER

## 2020-06-05 NOTE — TELEPHONE ENCOUNTER
"----- Message from Charisse Manley sent at 6/5/2020  3:14 PM CDT -----  Contact: [phone]  Patient states she requested her "Oxycodone" sent to [facility] not [facility]. Patient states she cannot wait for a mail order. Please call and advise  "

## 2020-06-05 NOTE — TELEPHONE ENCOUNTER
----- Message from Ayla Soliman sent at 6/5/2020  4:33 PM CDT -----  Contact: Patient  Requesting an RX refill or new RX.  Is this a refill or new RX:  Refill  RX name and strength: oxyCODONE-acetaminophen (PERCOCET)  mg per tablet  Directions (copy/paste from chart):    Is this a 30 day or 90 day RX:    Local pharmacy or mail order pharmacy:  Local  [facility]   Comments:

## 2020-06-05 NOTE — TELEPHONE ENCOUNTER
----- Message from Clarice Mari sent at 6/5/2020 11:55 AM CDT -----  Contact:  self [phone]  Requesting an RX refill or new RX.  Is this a refill or new RX:  refill  RX name and strength: oxyCODONE-acetaminophen (PERCOCET)  mg per tablet  Directions (copy/paste from chart):  Sig: Take one tablet by mouth 5 times daily  Is this a 30 day or 90 day RX:    Local pharmacy or mail order pharmacy:  local  Pharmacy name and phone # (copy/paste from chart):   [facility] [phone] (Phone)  [phone] (Fax)  Comments:    Pt states can she get this a little earlier due to the fadumo in case we have to evacuate.

## 2020-06-06 RX ORDER — OXYCODONE AND ACETAMINOPHEN 10; 325 MG/1; MG/1
TABLET ORAL
Qty: 150 TABLET | Refills: 0 | Status: SHIPPED | OUTPATIENT
Start: 2020-06-06 | End: 2020-06-19 | Stop reason: SDUPTHER

## 2020-06-10 ENCOUNTER — CLINICAL SUPPORT (OUTPATIENT)
Dept: SPEECH THERAPY | Facility: HOSPITAL | Age: 80
End: 2020-06-10
Attending: INTERNAL MEDICINE
Payer: MEDICARE

## 2020-06-10 ENCOUNTER — HOSPITAL ENCOUNTER (OUTPATIENT)
Dept: RADIOLOGY | Facility: HOSPITAL | Age: 80
Discharge: HOME | End: 2020-06-10
Attending: INTERNAL MEDICINE
Payer: MEDICARE

## 2020-06-10 DIAGNOSIS — R13.12 DYSPHAGIA, OROPHARYNGEAL: Primary | ICD-10-CM

## 2020-06-10 DIAGNOSIS — R13.10 SWALLOWING DISORDER: ICD-10-CM

## 2020-06-10 DIAGNOSIS — R63.30 FEEDING DIFFICULTIES: ICD-10-CM

## 2020-06-10 PROCEDURE — 25500020 PHARM REV CODE 255: Mod: HCNC | Performed by: INTERNAL MEDICINE

## 2020-06-10 PROCEDURE — 74230 X-RAY XM SWLNG FUNCJ C+: CPT | Mod: 26,HCNC,, | Performed by: RADIOLOGY

## 2020-06-10 PROCEDURE — 74230 X-RAY XM SWLNG FUNCJ C+: CPT | Mod: TC,HCNC

## 2020-06-10 PROCEDURE — 74230 FL MODIFIED BARIUM SWALLOW SPEECH STUDY: ICD-10-PCS | Mod: 26,HCNC,, | Performed by: RADIOLOGY

## 2020-06-10 PROCEDURE — 92611 MOTION FLUOROSCOPY/SWALLOW: CPT | Mod: GN,HCNC | Performed by: SPEECH-LANGUAGE PATHOLOGIST

## 2020-06-10 PROCEDURE — A9698 NON-RAD CONTRAST MATERIALNOC: HCPCS | Mod: HCNC | Performed by: INTERNAL MEDICINE

## 2020-06-10 RX ADMIN — BARIUM SULFATE 80 ML: 0.81 POWDER, FOR SUSPENSION ORAL at 02:06

## 2020-06-10 NOTE — PROGRESS NOTES
MODIFIED BARIUM SWALLOW STUDY    REASON FOR REFERRAL:  Ms. Henrietta Villasenor, age 80, was referred by Dr. Jeannine Arriaga, internist, for a repeat Modified Barium Swallow Study to rule out aspiration, assess her overall swallowing function, and determine safest consistencies for oral intake.    Ms. Villasenor reported a history of coughing with onset late April/early May of coughing, worse at night.  She also reported a globus sensation with foods and without in response to which she coughs and/or drinks water to clear.  She localized the globus as base of neck.  She endorsed a history of reflux, but denied taking any medication for it or avoiding any particular foods.  She does elevate her head with pillows for sleeping.    Ms. Villasenor reported that she cannot tolerate her dentures, which she has had for about a year.  She does not attempt to eat with them, and therefore eats primarily soft foods.    She related a history of mastoid tumor removal (L) in 1969 with resultant L facial weakness that persists.    History esophageal dysmotility, nutcracker esophagus, and small hiatal hernia.  See below for additional swallowing history.      MEDICAL HISTORY:  Past Medical History:   Diagnosis Date    Abnormality of lung 11/08/2011    Stable bandlike opacities at the lung bases, most likely representing      Anxiety     Arthritis     CAD (coronary artery disease)     Calculus of ureter 2/22/2011    CHF (congestive heart failure)     Chronic back pain 7/29/2012    Colon polyp 9/2010; 11/2010    Colon polyps 7/29/2012    Coronary artery disease involving native coronary artery of native heart with angina pectoris     Depression     Diabetes mellitus     Diabetes mellitus type II     Diverticulitis large intestine 7/27/2015    Diverticulitis of large intestine without perforation or abscess without bleeding     Diverticulosis 09/25/2010; 11/02/2010; 11/08/2011; 7/29/2012    Duodenal disorder 08/25/2011    Duodenal  erosion noted on EGD.    Duodenal ulcer, unspecified as acute or chronic, without hemorrhage, perforation, or obstruction 8/24/2011    E. coli sepsis 12/2010    Due to left ureteral stone with left nephrostomy tube - hospitalized in [place]    Esophageal dysmotility 01/24/2012    Noted on upper GI-barium swallow.    Facial weakness 1969    Left facial weakness s/p left mastoidectomy in ~ 1969.    Fatty liver 11/08/2011    Reported on CT-abdomen and in 06/2012 Gastro clinic visit note.    Gastric polyp 09/29/2010    GERD (gastroesophageal reflux disease) 7/29/2012    Hepatomegaly 11/08/2011    Reported on CT-abdomen    Herpes zoster with other nervous system complications(053.19) 2/28/2011    Hiatal hernia 06/26/2006; 09/29/2010; 08/25/2011    Noted on barium swallow 2006; noted on  EGD 2011.   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    Trouble in sleeping     Type II or unspecified type diabetes mellitus with neurological manifestations, not stated as uncontrolled(250.60)     Type II or unspecified type diabetes mellitus with peripheral circulatory disorders, not stated as uncontrolled(250.70)     Type II or unspecified type diabetes mellitus with renal manifestations, not stated as  uncontrolled(250.40)         SURGICAL HISTORY:  Past Surgical History:   Procedure Laterality Date    BELPHAROPTOSIS REPAIR      s/p LAMBERTO levator repair - Dr. Dejesus    CARDIAC CATHETERIZATION      CARPAL TUNNEL RELEASE  3/13/2012    CATARACT EXTRACTION W/  INTRAOCULAR LENS IMPLANT Right 10/31/2018        CATARACT EXTRACTION W/  INTRAOCULAR LENS IMPLANT Left 03/22/2018        CHOLECYSTECTOMY      COLONOSCOPY N/A 11/16/2016    Procedure: COLONOSCOPY;  Surgeon: Chris Storey MD;  [place] [facility]  Service: Endoscopy;  Laterality: N/A;    COLONOSCOPY N/A 6/14/2017    Procedure: COLONOSCOPY;  Surgeon: Chris Storey MD;  [facility] [facility]);  Service: Endoscopy;  Laterality: N/A;  colonoscopy in 3 months with better bowel prep. - Split PEG prep ordered    EXTRACORPOREAL SHOCK WAVE LITHOTRIPSY      INTRAOCULAR PROSTHESES INSERTION Right 10/31/2018    Procedure: INSERTION-INTRAOCULAR LENS (IOL);  Surgeon: Keysha Valle MD;  Location: [facility] OR;  Service: Ophthalmology;  Laterality: Right;    JOINT REPLACEMENT      mastoid tumor removal  1969    Left mastoidectomy with residual left facial weakness.    NEPHROSTOMY      OOPHORECTOMY      PHACOEMULSIFICATION OF CATARACT Right 10/31/2018    Procedure: PHACOEMULSIFICATION-ASPIRATION-CATARACT;  Surgeon: Keysha Valle MD;  Location: [facility] OR;  Service: Ophthalmology;  Laterality: Right;    TOTAL ABDOMINAL HYSTERECTOMY  1969    TAHBSO    TOTAL KNEE ARTHROPLASTY  9/13/2012    Left    TRANSFORAMINAL EPIDURAL INJECTION OF STEROID Bilateral 9/17/2019    Procedure: INJECTION, STEROID, EPIDURAL, TRANSFORAMINAL APPROACH;  Surgeon: Oscar Ravi MD;  Location: [facility] PAIN MGT;  Service: Pain Management;  Laterality: Bilateral;  B/L TFESI L4/L5       SWALLOWING HISTORY:  Ms. Villasenor has had at least three previous MBSS, the most recent of which was April 9, 2014 with Dr. Isabel Gooden, SLP.  At that time, the  findings stated:  1.  No oropharyngeal or cervical esophageal swallowing abnormalities were identified during this modified barium swallow study (e.g., there were no overt anatomical or motoric physiological abnormalities). There was no pharyngeal pooling/stasis/residue, no laryngeal penetration/aspiration, and no velopharyngeal reflux/regurgitation. Thin liquids, a variety of food consistencies, and a BA tablet were swallowed WNL for this swallow study. Prior to the current modified barium swallow study, the patient had 2 modified barium swallow studies that did not identify an oropharyngeal or cervical esophageal swallowing problem.     2. Most likely etiology of the patient's swallowing concerns is her history of esophageal dysmotility and nutcracker esophagus causing referred discomfort and sensation of difficulty upward to the oropharyngeal and cervical esophageal areas and/or causing proximal escape of what was swallowed with that moving possibly as high as the velopharynx such that the patient feels something she swallowed moving into her nasal area. Other etiologies are not known by this speech pathologist at this time for this patient.      Most recent esophagram was 5/13/13 with findings stating,   The oral and pharyngeal stages of swallowing were normal without evidence of laryngeal penetration or aspiration. No pharyngeal mass identified. A barium tablet was swallowed and passed through the esophagus to the GE junction appropriately.  The tablet   appeared to remain at the GE junction for a period of time but later during the examination passed into the stomach.    The esophagus was normal in contour and caliber without evidence of masses or strictures. Esophageal dysmotility was demonstrated with proximal escape and tertiary contractions. No gastroesophageal reflux occurred during this exam.  A small hiatal hernia   was demonstrated.  The gastric cardia was unremarkable in appearance.     History EGD  "with esophageal dilation Nov 2015 with subsequent note from visit with Dr. Storey stating, "History of Intermittent solid food dysphagia which resolved completely with esophageal   Dilation."  He recommended she follow up PRN.    FAMILY HISTORY:  Family History   Problem Relation Age of Onset    Sleep apnea Sister     Diabetes Sister     Cancer Mother         brain tumor    Hypertension Mother     Heart disease Father     Heart attack Father     Dementia Brother     Lupus Brother     Frontotemporal dementia Brother     No Known Problems Daughter     No Known Problems Son     Diabetes Sister     Lupus Sister     Breast cancer Sister     Diabetes Sister     Cancer Sister         breast cancer    Heart attack Sister     Diabetes Sister     Liver cancer Brother     Drug abuse Brother     Alcohol abuse Brother     Cirrhosis Brother     Drug abuse Brother     No Known Problems Daughter     No Known Problems Son     No Known Problems Son     No Known Problems Maternal Grandmother     No Known Problems Maternal Grandfather     No Known Problems Paternal Grandmother     No Known Problems Paternal Grandfather     Diabetes Brother     Dementia Brother     Diabetes Other     Ovarian cancer Other         Niece     Breast cancer Maternal Aunt     No Known Problems Maternal Uncle     No Known Problems Paternal Aunt     No Known Problems Paternal Uncle     Glaucoma Neg Hx     Blindness Neg Hx     Celiac disease Neg Hx     Colon cancer Neg Hx     Colon polyps Neg Hx     Esophageal cancer Neg Hx     Inflammatory bowel disease Neg Hx     Liver disease Neg Hx     Rectal cancer Neg Hx     Stomach cancer Neg Hx     Ulcerative colitis Neg Hx     Melanoma Neg Hx     Multiple sclerosis Neg Hx     Psoriasis Neg Hx     Amblyopia Neg Hx     Cataracts Neg Hx     Macular degeneration Neg Hx     Retinal detachment Neg Hx     Strabismus Neg Hx     Stroke Neg Hx     Thyroid disease Neg Hx "         SOCIAL HISTORY:  Ms. Villasenor lives in [place].      Tobacco:  Former smoker, per EMR.  ETOH:  No per EMR.    BEHAVIOR:  Ms. Villasenor was a very pleasant woman who had normal affect and social interaction.  She was able to fully cooperate during the study.  Results of today's assessment were considered indicative of her current levels of swallowing functioning.      HEARING:  Subjectively, within normal limits with hearing aids in place.  Has bilateral SNHL, L worse than R.     ORAL PERIPHERAL:   Informal examination of the oral mechanism revealed structures and functioning within functional limits for swallowing and speech purposes. She had a L facial weakness that she related to her history of mastoid tumor removal (1969).  She was edentulous.  Speech was 100% intelligible with no significant distortions.  Tongue had macroglossic appearance, possibly related to absent teeth.     Voice quality was within normal limits with no wet quality before, during, or after the study.      TEST FINDINGS:   Ms. Villasenor was seen in Radiology with the Radiologist for a Modified Barium Swallow Study.  Mrs. Villasenor was seated on a stool for a left lateral videofluoroscopic view.      Consistencies assessed using radiopaque barium contrast:  thin (single and continuous swallows via open cup),   thin puree (1-teaspoon bolus) via spoon,   thick puree (1-teaspoon bolus) via spoon,   solid (whole cracker boluses) by Mrs. Villasenor' hand, and   13 mm barium tablet with water/.    Strategies:  Dry swallow -- cleared thick puree residue and reduced solid residue  Liquid swallow -- cleared solid residue not cleared by dry swallow    Phases:  Oral:  Mrs. Villasenor was able to obtain liquid and strip utensils well with no loss of material from the oral cavity.  She moved boluses through the oral cavity with appropriate transit time. Reduced mastication due to edentulous status.  There was no pooling of liquids in the mouth.  Swallow reflex was  triggered within functional limits:  There was premature spillage of solids to valleculae.    Pharyngeal:  Boluses moved through the pharyngeal phase with no laryngeal penetration and no aspiration and no nasal regurgitation.     - Timely to mildly delayed initiation; see above.  - Adequate soft palate elevation  - Adequate laryngeal elevation and anterior hyoid excursion  - Adequate tongue base retraction  - Complete epiglottic inversion  - Complete laryngeal vestibular closure  - Adequate pharyngeal stripping wave  - Reduced (mild) PE segment opening per trace residue.  -  Minimum to moderate pharyngeal residue of solids in valleculae and pyriforms, increasing in volume with increased viscosity of bolus material.  Trace thin liquid residue throughout pharynx (BOT, valleculae, pyriforms, hypopharynx).    Cervical Esophageal:  Boluses entered the upper esophagus nearly completely.  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    Rosenbeck 8-point Penetration-Aspiration Scale:  1 - Material does not enter airway.      IMPRESSIONS:   This 80 y.o. woman appears to present with  1.  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  This was cleared with dry or dry and liquid swallows.  2.  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  3.  Xerostomia.  4.  History of GERD.  5.  L facial weakness with history of mastoid tumor excision 1969.      RECOMMENDATIONS/PLAN OF CARE:   It is felt that Mrs. Villasenor would benefit from  1.  Continuation of her current regular  consistency diet as tolerated with thin liquids using the following strategies and common aspiration precautions, including, but not limited to   A.  Appropriate upright seating for all eating and drinking.   B.  Pre-processing solids that are typically masticated (mincing, fork-mashing, pureeing to reduce effort in mastication as she eats without her dentures.   C.  Use of dry and/or liquid swallows to clear liquid and solid residue.   D.  Monitoring for any signs/symptoms of aspiration (such as wet/gurgly voice that does not clear with coughing, inability to make any voice sounds, any persistent coughing with oral intake, otherwise unexplained fever, unexplained increased or new difficulty or discomfort breathing, unexplained increase in sleepiness/lethargy/significant fatigue, unexplained increase or new onset confusion or change in cognitive functioning, or any other unexplained change in health or well-being that could be related to swallowing).  2.  Consider return to Dr. Storey's clinic and possibly repeat esophagram as most recent was 2013.  3.  Trial of OTC xerostomia products (will send handout) to see if any may give any more relief than sipping water.  4.  Repeat MBSS as needed.  5.  Follow up with Dr. Arriaga as directed.

## 2020-06-15 ENCOUNTER — OFFICE VISIT (OUTPATIENT)
Dept: PAIN MEDICINE | Facility: CLINIC | Age: 80
End: 2020-06-15
Payer: MEDICARE

## 2020-06-15 VITALS
TEMPERATURE: 99 F | DIASTOLIC BLOOD PRESSURE: 83 MMHG | HEIGHT: 68 IN | SYSTOLIC BLOOD PRESSURE: 149 MMHG | RESPIRATION RATE: 18 BRPM | BODY MASS INDEX: 33.95 KG/M2 | WEIGHT: 224 LBS | HEART RATE: 74 BPM | OXYGEN SATURATION: 100 %

## 2020-06-15 DIAGNOSIS — G89.29 OTHER CHRONIC PAIN: ICD-10-CM

## 2020-06-15 DIAGNOSIS — M79.18 MYOFASCIAL PAIN: ICD-10-CM

## 2020-06-15 DIAGNOSIS — M47.812 CERVICAL SPONDYLOSIS: Primary | ICD-10-CM

## 2020-06-15 PROCEDURE — 3079F DIAST BP 80-89 MM HG: CPT | Mod: HCNC,CPTII,S$GLB, | Performed by: NURSE PRACTITIONER

## 2020-06-15 PROCEDURE — 99999 PR PBB SHADOW E&M-EST. PATIENT-LVL V: ICD-10-PCS | Mod: PBBFAC,HCNC,, | Performed by: NURSE PRACTITIONER

## 2020-06-15 PROCEDURE — 1101F PT FALLS ASSESS-DOCD LE1/YR: CPT | Mod: HCNC,CPTII,S$GLB, | Performed by: NURSE PRACTITIONER

## 2020-06-15 PROCEDURE — 3077F SYST BP >= 140 MM HG: CPT | Mod: HCNC,CPTII,S$GLB, | Performed by: NURSE PRACTITIONER

## 2020-06-15 PROCEDURE — 3077F PR MOST RECENT SYSTOLIC BLOOD PRESSURE >= 140 MM HG: ICD-10-PCS | Mod: HCNC,CPTII,S$GLB, | Performed by: NURSE PRACTITIONER

## 2020-06-15 PROCEDURE — 3079F PR MOST RECENT DIASTOLIC BLOOD PRESSURE 80-89 MM HG: ICD-10-PCS | Mod: HCNC,CPTII,S$GLB, | Performed by: NURSE PRACTITIONER

## 2020-06-15 PROCEDURE — 1125F PR PAIN SEVERITY QUANTIFIED, PAIN PRESENT: ICD-10-PCS | Mod: HCNC,S$GLB,, | Performed by: NURSE PRACTITIONER

## 2020-06-15 PROCEDURE — 99213 PR OFFICE/OUTPT VISIT, EST, LEVL III, 20-29 MIN: ICD-10-PCS | Mod: 25,HCNC,S$GLB, | Performed by: NURSE PRACTITIONER

## 2020-06-15 PROCEDURE — 1159F MED LIST DOCD IN RCRD: CPT | Mod: HCNC,S$GLB,, | Performed by: NURSE PRACTITIONER

## 2020-06-15 PROCEDURE — 99999 PR PBB SHADOW E&M-EST. PATIENT-LVL V: CPT | Mod: PBBFAC,HCNC,, | Performed by: NURSE PRACTITIONER

## 2020-06-15 PROCEDURE — 20553 PR INJECT TRIGGER POINTS, > 3: ICD-10-PCS | Mod: HCNC,S$GLB,, | Performed by: NURSE PRACTITIONER

## 2020-06-15 PROCEDURE — 1159F PR MEDICATION LIST DOCUMENTED IN MEDICAL RECORD: ICD-10-PCS | Mod: HCNC,S$GLB,, | Performed by: NURSE PRACTITIONER

## 2020-06-15 PROCEDURE — 1101F PR PT FALLS ASSESS DOC 0-1 FALLS W/OUT INJ PAST YR: ICD-10-PCS | Mod: HCNC,CPTII,S$GLB, | Performed by: NURSE PRACTITIONER

## 2020-06-15 PROCEDURE — 1125F AMNT PAIN NOTED PAIN PRSNT: CPT | Mod: HCNC,S$GLB,, | Performed by: NURSE PRACTITIONER

## 2020-06-15 PROCEDURE — 20553 NJX 1/MLT TRIGGER POINTS 3/>: CPT | Mod: HCNC,S$GLB,, | Performed by: NURSE PRACTITIONER

## 2020-06-15 PROCEDURE — 99213 OFFICE O/P EST LOW 20 MIN: CPT | Mod: 25,HCNC,S$GLB, | Performed by: NURSE PRACTITIONER

## 2020-06-15 RX ORDER — BUPIVACAINE HYDROCHLORIDE 2.5 MG/ML
9 INJECTION, SOLUTION EPIDURAL; INFILTRATION; INTRACAUDAL
Status: COMPLETED | OUTPATIENT
Start: 2020-06-15 | End: 2020-06-15

## 2020-06-15 RX ORDER — BETAMETHASONE SODIUM PHOSPHATE AND BETAMETHASONE ACETATE 3; 3 MG/ML; MG/ML
6 INJECTION, SUSPENSION INTRA-ARTICULAR; INTRALESIONAL; INTRAMUSCULAR; SOFT TISSUE
Status: COMPLETED | OUTPATIENT
Start: 2020-06-15 | End: 2020-06-15

## 2020-06-15 RX ADMIN — BUPIVACAINE HYDROCHLORIDE 22.5 MG: 2.5 INJECTION, SOLUTION EPIDURAL; INFILTRATION; INTRACAUDAL at 02:06

## 2020-06-15 RX ADMIN — BETAMETHASONE SODIUM PHOSPHATE AND BETAMETHASONE ACETATE 6 MG: 3; 3 INJECTION, SUSPENSION INTRA-ARTICULAR; INTRALESIONAL; INTRAMUSCULAR; SOFT TISSUE at 02:06

## 2020-06-15 NOTE — PROGRESS NOTES
Chronic Pain - Established Visit    Referring Physician: Self, Aaareferral    Chief Complaint:   No chief complaint on file.       SUBJECTIVE:    Interval History 6/15/2020:  The patient presents for follow up of severe Left>R sided neck pain with radiation into shoulder and states pain does not go below shoulders. Pt was scheduled for B C4,5,6 MBB but secondary to covid she had to reschedule. The patient denies myelopathic symptoms such as handwriting changes or difficulty with buttons/coins/keys. Denies perineal paresthesias, bowel/bladder dysfunction. She states her CBGs are in low 100s at this time. She is taking Robaxin PRN.     Interval History 4/3/2020:  Follow up is for neck pain. She was scheduled for C4, C5, C6 MBB on 3/20/20 but this had to be canceled for now due to the covid epidemic. At last visit her tizanidine prescription was changed to robaxan. She says her pain is tolerable with robaxan and Biofreeze which she purchased over-the-counter.  Denies any numbness, weakness, bowel/bladder changes.     Interval History 3/4/2020:  The patient returns to clinic today for follow up of neck pain. She reports that trigger point injections at last visit provided significant relief until the last week. She reports neck pain that is sharp and aching in nature. She denies any radiating arm pain. Her pain is worse with turning her head to the side. She reports difficulty doing housework due to pain. She is currently taking Zanaflex with limited relief. She does take Percocet per her PCP with benefit. She denies any other health changes. Her pain today is 9/10.        Interval History 1/30/2020:  The patient is here to discuss new onset neck pain.  It is mainly located over the left neck and shoulder area.  She denies radiation past the shoulder.  She denies numbness and tingling.  She denies weakness.  She had a recent ED visit for dizziness.  She had CT scans to rule out clot.  She would like an injection today.   She denies bowel or bladder changes.  Her pain today is 10/10.    Interval History 12/3/2019:  The patient is here for follow up of back pain.  Her pain is radiating down the sides of both legs with numbness.  Her BP remains elevated today.  She is followed by her PCP for this.  She denies SOB or chest pain today.  She thinks that it is always high when she comes to [employer] due to walking far from the garage.  She did have benefit with previous bilateral L4-5 TF JANEE.  Her pain today is 4/10.  The patient denies any bowel or bladder incontinence or signs of saddle paresthesia.  The patient denies any major medical changes since last office visit.    Interval History 10/1/2019:  The patient is here for follow up today of back and leg pain.  She is s/p bilateral L4-5 TF JANEE with 50% relief.  She says that her pain is more tolerable.  She has more pain with walking, such as walking to our office today.  She has persistent burning to both feet.  She has a known history of diabetes.  Her pain is worse at night and she describes it as burning.  She has intermittent radiation down the sides of both legs.  Her back pain is greater than her leg pain.  She takes Percocet from her PCP with benefit.  She would like to repeat Healthy Back.  She feels as though this was helpful and would like to restart.  Her pain today is 7/10.  Her blood pressure is elevated today.  She says that she has not taken her medication today because she was rushing to get to her appointment.  She denies headache, SOB, or chest pain.     Initial Encounter:  Henrietta Villasenor presents to the clinic for the evaluation of low back pain. The pain started and symptoms have been worsening.The pain is located in the low back area and radiates to the bilateral leg swelling.  The pain is described as sharp, shooting, throbbing, tingling and constant and is rated as 5/10. The pain is rated with a score of  5/10 on the BEST day and a score of 8/10 on the WORST day.   Symptoms interfere with daily activity and sleeping. The pain is exacerbated by Standing, Bending, Coughing/Sneezing, Walking, Morning, Lifting and Getting out of bed/chair.  The pain is mitigated by medications. She reports spending 0 hours per day reclining. The patient reports 2 hours of uninterrupted sleep per night.    Patient denies night fever/night sweats, urinary incontinence, bowel incontinence, significant weight loss, significant motor weakness and loss of sensations.    Physical Therapy/Home Exercise: yes      Pain Disability Index Review:  Last 3 PDI Scores 6/15/2020 3/4/2020 1/30/2020   Pain Disability Index (PDI) 54 57 70       Pain Medications:    - Opioids: Percocet (Oxycodone/Acetaminophen)  - Adjuvant Medications: Cymbalta ( Duloxetine) and Tizanidine  - Anti-Coagulants: None  - Others: None     report:  Reviewed and consistent with medication use as prescribed.    Pain Procedures:   9/17/19 Bilateral L4-5 TF JANEE- 50% relief    Imaging:  CT Cervical Spine 1/2/2020:    FINDINGS:  No evidence of acute cervical spine fracture or dislocation.  Odontoid process is intact.  Craniocervical junction is unremarkable.  Cervical spine alignment is within normal limits.  Prominent multilevel degenerative changes are seen with DISH.    Surrounding soft tissues show no significant abnormalities.  Airway is patent.      Impression       No evidence of acute cervical spine fracture or dislocation.         6/12/18 MRI Lumbar  Narrative     EXAMINATION:  MRI LUMBAR SPINE WITHOUT CONTRAST    CLINICAL HISTORY:  low back and right leg pain; Other intervertebral disc degeneration, lumbar region    TECHNIQUE:  Multiplanar, multisequence MR images were acquired from the thoracolumbar junction to the sacrum without the administration of contrast.    COMPARISON:  Radiograph 06/05/2018    FINDINGS:  Alignment: Normal.    Vertebrae: Normal marrow signal. No fracture.    Discs: Normal height and signal.    Cord: Normal.   Conus terminates at superior border of L1    Degenerative findings:    T12-L1: Mild broad-based disc bulge without compressive sequelae.    L1-L2: Unremarkable.    L2-L3: Mild broad-based disc bulge with facet arthropathy.  No compressive sequelae.    L3-L4: Broad-based disc bulge and facet arthropathy.  There is mild bilateral neural foraminal narrowing.    L4-L5: Trace anterolisthesis of L4 on L5 with broad-based disc bulge and facet arthropathy.  This results in moderate bilateral neural foraminal narrowing.  Moderate canal stenosis.  There is also significant narrowing of the left greater than right lateral recesses.    L5-S1: Broad-based disc bulge without significant compressive sequelae.    Paraspinal muscles & soft tissues: Paraspinal atrophy.  No concerning findings in the visualized abdomen.  Add      Impression       Moderate degenerative change at L4-5.  Milder degenerative changes elsewhere in the lumbar spine.     6/5/18 Xray Lumbar  Narrative     EXAMINATION:  XR LUMBAR SPINE 5 VIEW WITH FLEX AND EXT    CLINICAL HISTORY:  low back and right leg pain;  Other intervertebral disc degeneration, lumbar region    TECHNIQUE:  Five views of the lumbar spine plus flexion extension views were performed.    COMPARISON:  02/03/2015    FINDINGS:  The lumbar vertebra are intact.  No compression fracture is identified.  Degenerative changes are again seen with small osteophytes at multiple levels.  L4-5 disc space is mildly narrowed.  Degenerative changes are also noted at lower facet joints.  There is associated grade 1 spondylolisthesis at L4-5 which is stable between flexion and extension at about 6 mm.  Alignment is otherwise satisfactory.    Visualized abdomen shows aortic atherosclerosis and surgical clips in the right upper quadrant.      Impression       No acute abnormality.  Chronic degenerative spine changes.           Past Medical History:   Diagnosis Date    Abnormality of lung 11/08/2011    Stable  bandlike opacities at the lung bases, most likely representing      Anxiety     Arthritis     CAD (coronary artery disease)     Calculus of ureter 2/22/2011    CHF (congestive heart failure)     Chronic back pain 7/29/2012    Colon polyp 9/2010; 11/2010    Colon polyps 7/29/2012    Coronary artery disease involving native coronary artery of native heart with angina pectoris     Depression     Diabetes mellitus     Diabetes mellitus type II     Diverticulitis large intestine 7/27/2015    Diverticulitis of large intestine without perforation or abscess without bleeding     Diverticulosis 09/25/2010; 11/02/2010; 11/08/2011; 7/29/2012    Duodenal disorder 08/25/2011    Duodenal erosion noted on EGD.    Duodenal ulcer, unspecified as acute or chronic, without hemorrhage, perforation, or obstruction 8/24/2011    E. coli sepsis 12/2010    Due to left ureteral stone with left nephrostomy tube - hospitalized in [place]    Esophageal dysmotility 01/24/2012    Noted on upper GI-barium swallow.    Facial weakness 1969    Left facial weakness s/p left mastoidectomy in ~ 1969.    Fatty liver 11/08/2011    Reported on CT-abdomen and in 06/2012 Gastro clinic visit note.    Gastric polyp 09/29/2010    GERD (gastroesophageal reflux disease) 7/29/2012    Hepatomegaly 11/08/2011    Reported on CT-abdomen    Herpes zoster with other nervous system complications(053.19) 2/28/2011    Hiatal hernia 06/26/2006; 09/29/2010; 08/25/2011    Noted on barium swallow 2006; noted on  EGD 2011.   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    Trouble in sleeping     Type II or unspecified type diabetes mellitus with neurological manifestations, not stated as uncontrolled(250.60)     Type II or unspecified type diabetes mellitus with peripheral circulatory disorders, not stated as uncontrolled(250.70)     Type II or unspecified type diabetes mellitus with renal manifestations, not stated as uncontrolled(250.40)      Past Surgical History:   Procedure Laterality Date    BELPHAROPTOSIS REPAIR      s/p LAMBERTO levator repair - Dr. Dejesus    CARDIAC CATHETERIZATION      CARPAL TUNNEL RELEASE  3/13/2012    CATARACT EXTRACTION W/  INTRAOCULAR LENS IMPLANT Right 10/31/2018        CATARACT EXTRACTION W/  INTRAOCULAR LENS IMPLANT Left 03/22/2018        CHOLECYSTECTOMY      COLONOSCOPY N/A 11/16/2016    Procedure: COLONOSCOPY;  Surgeon: Chris Storey MD;  Location: [address]);  Service: Endoscopy;  Laterality: N/A;    COLONOSCOPY N/A 6/14/2017    Procedure: COLONOSCOPY;  Surgeon: Chris Storey MD;  Location: [facility] ([address] [place] [address]);  Service: Endoscopy;  Laterality: N/A;  colonoscopy in 3 months with better bowel prep. - Split PEG prep ordered    EXTRACORPOREAL SHOCK WAVE LITHOTRIPSY      INTRAOCULAR PROSTHESES INSERTION Right 10/31/2018    Procedure: INSERTION-INTRAOCULAR LENS (IOL);  Surgeon: Keysha Valle MD;  Location[facility];  Service: Ophthalmology;  Laterality: Right;    JOINT REPLACEMENT      mastoid tumor removal  1969    Left mastoidectomy with residual left facial weakness.    NEPHROSTOMY      OOPHORECTOMY      PHACOEMULSIFICATION OF CATARACT Right 10/31/2018    Procedure:  PHACOEMULSIFICATION-ASPIRATION-CATARACT;  Surgeon: Keysha Valle MD;  Location: [facility] OR;  Service: Ophthalmology;  Laterality: Right;    TOTAL ABDOMINAL HYSTERECTOMY  1969    TAHBSO    TOTAL KNEE ARTHROPLASTY  2012    Left    TRANSFORAMINAL EPIDURAL INJECTION OF STEROID Bilateral 2019    Procedure: INJECTION, STEROID, EPIDURAL, TRANSFORAMINAL APPROACH;  Surgeon: Oscar Ravi MD;  Location: [facility] PAIN [facility]GT;  Service: Pain Management;  Laterality: Bilateral;  B/L TFESI L4/L5     Social History     Socioeconomic History    Marital status: Single     Spouse name: Not on file    Number of children: 5    Years of education: Not on file    Highest education level: Not on file   Occupational History    Not on file   Social Needs    Financial resource strain: Somewhat hard    Food insecurity     Worry: Sometimes true     Inability: Often true    Transportation needs     Medical: No     Non-medical: No   Tobacco Use    Smoking status: Former Smoker     Packs/day: 1.00     Years: 15.00     Pack years: 15.00     Types: Cigarettes     Quit date: 2000     Years since quittin.9    Smokeless tobacco: Never Used   Substance and Sexual Activity    Alcohol use: No    Drug use: No    Sexual activity: Never   Lifestyle    Physical activity     Days per week: 0 days     Minutes per session: 0 min    Stress: Very much   Relationships    Social connections     Talks on phone: More than three times a week     Gets together: Once a week     Attends [employer]oration[employer] service: More than 4 times per year     Active member of club or organization: Yes     Attends meetings of clubs or organizations: 1 to 4 times per year     Relationship status: Never    Other Topics Concern    Are you pregnant or think you may be? Not Asked    Breast-feeding Not Asked   Social History Narrative    Single with 5 children. Lives alone. Retired cook.     Family History   Problem Relation Age of Onset    Sleep apnea  Sister     Diabetes Sister     Cancer Mother         brain tumor    Hypertension Mother     Heart disease Father     Heart attack Father     Dementia Brother     Lupus Brother     Frontotemporal dementia Brother     No Known Problems Daughter     No Known Problems Son     Diabetes Sister     Lupus Sister     Breast cancer Sister     Diabetes Sister     Cancer Sister         breast cancer    Heart attack Sister     Diabetes Sister     Liver cancer Brother     Drug abuse Brother     Alcohol abuse Brother     Cirrhosis Brother     Drug abuse Brother     No Known Problems Daughter     No Known Problems Son     No Known Problems Son     No Known Problems Maternal Grandmother     No Known Problems Maternal Grandfather     No Known Problems Paternal Grandmother     No Known Problems Paternal Grandfather     Diabetes Brother     Dementia Brother     Diabetes Other     Ovarian cancer Other         Niece     Breast cancer Maternal Aunt     No Known Problems Maternal Uncle     No Known Problems Paternal Aunt     No Known Problems Paternal Uncle     Glaucoma Neg Hx     Blindness Neg Hx     Celiac disease Neg Hx     Colon cancer Neg Hx     Colon polyps Neg Hx     Esophageal cancer Neg Hx     Inflammatory bowel disease Neg Hx     Liver disease Neg Hx     Rectal cancer Neg Hx     Stomach cancer Neg Hx     Ulcerative colitis Neg Hx     Melanoma Neg Hx     Multiple sclerosis Neg Hx     Psoriasis Neg Hx     Amblyopia Neg Hx     Cataracts Neg Hx     Macular degeneration Neg Hx     Retinal detachment Neg Hx     Strabismus Neg Hx     Stroke Neg Hx     Thyroid disease Neg Hx        Review of patient's allergies indicates:   Allergen Reactions    Crestor [rosuvastatin]      Cramping in legs    Ezetimibe Diarrhea     Other reaction(s): abdominal pain, Diarrhea      Hydrocodone Itching    Lisinopril      Other reaction(s): cough      Sulfa (sulfonamide antibiotics) Itching and  "Rash           Sulfamethoxazole     Sulfamethoxazole-trimethoprim     Valsartan Swelling       Current Outpatient Medications   Medication Sig    ACCU-CHEK SMARTVIEW TEST STRIP Strp TEST THREE TIMES DAILY    albuterol (VENTOLIN HFA) 90 mcg/actuation inhaler Inhale 2 puffs by mouth into the lungs every 4 (four) hours as needed for Wheezing. Rescue    ALCOHOL ANTISEPTIC PADS (ALCOHOL PREP PADS TOP)     allopurinol (ZYLOPRIM) 100 MG tablet Take 1 tablet (100 mg total) by mouth once daily.    amLODIPine (NORVASC) 5 MG tablet Take 1 tablet (5 mg total) by mouth once daily.    aspirin (ECOTRIN) 81 MG EC tablet Take 1 tablet by mouth Daily.    atorvastatin (LIPITOR) 40 MG tablet Take 1 tablet (40 mg total) by mouth once daily.    BD ALCOHOL SWABS PadM USE  WHEN  TESTING BLOOD SUGAR FOUR TIMES DAILY    BD ULTRA-FINE OLU PEN NEEDLES 32 x 5/32 " Ndle Uses 4 times daily, on multiple daily insulin injections    blood sugar diagnostic Strp One Touch verio test strips - check blood sugar three times daily    blood-glucose meter kit Use as instructed    carbamide peroxide (EAR WAX REMOVAL DROPS OTIC)     carvediloL (COREG) 25 MG tablet Take 1 tablet (25 mg total) by mouth 2 (two) times daily.    cholecalciferol, vitamin D3, (DIALYVITE VITAMIN D) 5,000 unit capsule Take 5,000 Units by mouth once daily.    DULoxetine (CYMBALTA) 60 MG capsule Take 1 capsule (60 mg total) by mouth 2 (two) times daily.    fluticasone (FLONASE) 50 mcg/actuation nasal spray SHAKE LQ AND U 2 SPRAYS IEN QD    furosemide (LASIX) 40 MG tablet TAKE 1 TABLET BY MOUTH TWICE DAILY    insulin (LANTUS SOLOSTAR U-100 INSULIN) glargine 100 units/mL (3mL) SubQ pen Inject 55-60 units at night.    insulin aspart (NOVOLOG) 100 unit/mL InPn pen 23 units with meals plus correction scale. Max daily dose=120 units (Patient taking differently: Inject 23 Units into the skin 3 (three) times daily with meals. 23 units with meals plus correction scale. Max " "daily dose=120 units)    lancets (ACCU-CHEK FASTCLIX LANCING DEV) Misc Lancets and device = check blood sugar 4 times daily    loratadine (CLARITIN) 10 mg tablet Take 1 tablet (10 mg total) by mouth once daily.    meclizine (ANTIVERT) 12.5 mg tablet Take 1 tablet (12.5 mg total) by mouth 3 (three) times daily as needed for Dizziness.    oxyCODONE-acetaminophen (PERCOCET)  mg per tablet Take one tablet by mouth 5 times daily    pantoprazole (PROTONIX) 40 MG tablet TAKE 1 TABLET(40 MG) BY MOUTH twice daily    pen needle, diabetic (SURE-FINE PEN NEEDLES) 29 gauge x 1/2" Ndle Use 4 times daily    rOPINIRole (REQUIP) 0.5 MG tablet TAKE 1 TABLET (0.5 MG TOTAL) BY MOUTH EVERY EVENING.    TRUEPLUS LANCETS 33 gauge Misc TEST BLOOD SUGAR FOUR TIMES DAILY    azelastine (ASTELIN) 137 mcg (0.1 %) nasal spray Spray 1 spray (137 mcg total) by Nasal route 2 (two) times daily.    olopatadine (PATADAY) 0.2 % Drop Place 1 drop into both eyes once daily.    triamcinolone acetonide 0.1% (KENALOG) 0.1 % cream Apply topically 3 (three) times daily.     No current facility-administered medications for this visit.      Facility-Administered Medications Ordered in Other Visits   Medication    tetracaine HCl (PF) 0.5 % Drop 1 drop       REVIEW OF SYSTEMS:    GENERAL:  No weight loss, malaise or fevers.  HEENT:  Negative for frequent or significant headaches.  NECK:  Negative for lumps, goiter, pain and significant neck swelling.  RESPIRATORY:  Negative for cough, wheezing or shortness of breath. MARLON.  CARDIOVASCULAR:  Negative for chest pain, leg swelling or palpitations.  CAD and CHF.  GI:  Negative for abdominal discomfort, blood in stools or black stools or change in bowel habits. GERD.  MUSCULOSKELETAL:  Lower back and joint pain  SKIN:  Negative for lesions, rash, and itching.  PSYCH:  Negative for sleep disturbance, mood disorder and recent psychosocial stressors.  HEMATOLOGY/LYMPHOLOGY:  Negative for prolonged " "bleeding, bruising easily or swollen nodes. Anemia.  NEURO:   No history of headaches, syncope, paralysis, seizures or tremors.  All other reviewed and negative other than HPI.    OBJECTIVE:    BP (!) 149/83   Pulse 74   Temp 99.1 °F (37.3 °C)   Resp 18   Ht 5' 8" (1.727 m)   Wt 101.6 kg (224 lb)   LMP  (LMP Unknown)   SpO2 100%   BMI 34.06 kg/m²     PHYSICAL EXAMINATION:    General appearance: Well appearing, in no acute distress, alert and oriented x3.  Psych:  Mood and affect appropriate.  Skin: Skin color, texture, turgor normal, no rashes or lesions, in both upper and lower body.  Head/face:  Normocephalic, atraumatic. No palpable lymph nodes.  Cor: RRR  Pulm: CTA  GI:  Soft and non-tender.  Neck: Significant TTP over bilateral cervical paraspinals and trapezius muscles.  Limited ROM with pain on extension and lateral rotation to the left.  Spurling is negative bilaterally.  Extremities: Peripheral joint ROM is full and pain free without obvious instability or laxity in all four extremities. No deformities, edema, or skin discoloration. Good capillary refill.  Musculoskeletal: Bilateral upper and lower extremity strength is normal and symmetric.  No atrophy or tone abnormalities are noted.  Neuro: Bilateral upper and lower extremity coordination and muscle stretch reflexes are physiologic and symmetric.  Plantar response are downgoing. + decreased sensation to C8 distribution to the right. (-) Nichols   Gait: Antalgic- aided with cane.    ASSESSMENT: 80 y.o. year old female with chronic lower back pain and leg pain consistent with the following diagnoses:    1. Cervical spondylosis     2. Other chronic pain     3. Myofascial pain           PLAN:     - Previous imaging was reviewed and discussed with the patient today.    - Schedule for bilateral C4,5,6 MBB.     - If significant relief, we can proceed with RFA one side at a time.     -TPI done today and has significant immediate improvement in pain and " ROM    - I have stressed the importance of physical activity and a home exercise plan to help with pain and improve health.    - Continue Robaxin 500 mg TID.     - RTC in 2 weeks after above procedure.     - Counseled patient regarding the importance of physical therapy.      The above plan and management options were discussed at length with patient. Patient is in agreement with the above and verbalized understanding.     David Ackerman  06/15/2020     Trigger Point Injection:   The procedure was discussed with the patient including complications of nerve damage,  bleeding, infection, and failure of pain relief.   Trigger points were identified by palpation and marked. Alcohol prep of sites done. A mixture of 9mL 0.25% bupivacaine +40mg Depo-Medrol was prepared (10 mL total).   A 27-gauge needle was advanced to the point of maximal tenderness, and medication was injected after negative aspiration. All sites done in the same manner. Patient tolerated the procedure well and without complications. Sites injected included: left trapezius (3 sites total).

## 2020-06-17 ENCOUNTER — TELEPHONE (OUTPATIENT)
Dept: ADMINISTRATIVE | Facility: OTHER | Age: 80
End: 2020-06-17

## 2020-06-19 ENCOUNTER — OFFICE VISIT (OUTPATIENT)
Dept: INTERNAL MEDICINE | Facility: CLINIC | Age: 80
End: 2020-06-19
Payer: MEDICARE

## 2020-06-19 ENCOUNTER — OFFICE VISIT (OUTPATIENT)
Dept: PODIATRY | Facility: CLINIC | Age: 80
End: 2020-06-19
Payer: MEDICARE

## 2020-06-19 ENCOUNTER — HOSPITAL ENCOUNTER (OUTPATIENT)
Dept: RADIOLOGY | Facility: HOSPITAL | Age: 80
Discharge: HOME | End: 2020-06-19
Attending: INTERNAL MEDICINE
Payer: MEDICARE

## 2020-06-19 VITALS
HEART RATE: 75 BPM | BODY MASS INDEX: 34.29 KG/M2 | DIASTOLIC BLOOD PRESSURE: 60 MMHG | SYSTOLIC BLOOD PRESSURE: 100 MMHG | WEIGHT: 225.5 LBS | OXYGEN SATURATION: 98 %

## 2020-06-19 VITALS
DIASTOLIC BLOOD PRESSURE: 85 MMHG | SYSTOLIC BLOOD PRESSURE: 144 MMHG | BODY MASS INDEX: 35.06 KG/M2 | WEIGHT: 230.63 LBS

## 2020-06-19 DIAGNOSIS — G89.29 OTHER CHRONIC BACK PAIN: Chronic | ICD-10-CM

## 2020-06-19 DIAGNOSIS — E53.8 VITAMIN B12 DEFICIENCY: ICD-10-CM

## 2020-06-19 DIAGNOSIS — L84 CORN OR CALLUS: ICD-10-CM

## 2020-06-19 DIAGNOSIS — E78.5 HYPERLIPIDEMIA, UNSPECIFIED HYPERLIPIDEMIA TYPE: ICD-10-CM

## 2020-06-19 DIAGNOSIS — E11.42 DIABETIC POLYNEUROPATHY ASSOCIATED WITH TYPE 2 DIABETES MELLITUS: Primary | ICD-10-CM

## 2020-06-19 DIAGNOSIS — K21.9 GASTROESOPHAGEAL REFLUX DISEASE WITHOUT ESOPHAGITIS: ICD-10-CM

## 2020-06-19 DIAGNOSIS — B35.1 ONYCHOMYCOSIS DUE TO DERMATOPHYTE: ICD-10-CM

## 2020-06-19 DIAGNOSIS — R13.19 ESOPHAGEAL DYSPHAGIA: ICD-10-CM

## 2020-06-19 DIAGNOSIS — I10 ESSENTIAL HYPERTENSION: Chronic | ICD-10-CM

## 2020-06-19 DIAGNOSIS — M54.9 OTHER CHRONIC BACK PAIN: Chronic | ICD-10-CM

## 2020-06-19 DIAGNOSIS — Z12.31 SCREENING MAMMOGRAM FOR HIGH-RISK PATIENT: ICD-10-CM

## 2020-06-19 DIAGNOSIS — M10.9 GOUT, ARTHRITIS: ICD-10-CM

## 2020-06-19 DIAGNOSIS — E55.9 VITAMIN D DEFICIENCY DISEASE: ICD-10-CM

## 2020-06-19 DIAGNOSIS — E13.9 DIABETES MELLITUS DUE TO ABNORMAL INSULIN: Primary | ICD-10-CM

## 2020-06-19 PROCEDURE — 11056 PARNG/CUTG B9 HYPRKR LES 2-4: CPT | Mod: Q9,HCNC,S$GLB, | Performed by: PODIATRIST

## 2020-06-19 PROCEDURE — 99999 PR PBB SHADOW E&M-EST. PATIENT-LVL IV: CPT | Mod: PBBFAC,HCNC,, | Performed by: PODIATRIST

## 2020-06-19 PROCEDURE — 3078F PR MOST RECENT DIASTOLIC BLOOD PRESSURE < 80 MM HG: ICD-10-PCS | Mod: HCNC,CPTII,S$GLB, | Performed by: INTERNAL MEDICINE

## 2020-06-19 PROCEDURE — 11056 PR TRIM BENIGN HYPERKERATOTIC SKIN LESION,2-4: ICD-10-PCS | Mod: Q9,HCNC,S$GLB, | Performed by: PODIATRIST

## 2020-06-19 PROCEDURE — 99999 PR PBB SHADOW E&M-EST. PATIENT-LVL II: CPT | Mod: PBBFAC,HCNC,, | Performed by: INTERNAL MEDICINE

## 2020-06-19 PROCEDURE — 99214 PR OFFICE/OUTPT VISIT, EST, LEVL IV, 30-39 MIN: ICD-10-PCS | Mod: HCNC,S$GLB,, | Performed by: INTERNAL MEDICINE

## 2020-06-19 PROCEDURE — 99214 OFFICE O/P EST MOD 30 MIN: CPT | Mod: HCNC,S$GLB,, | Performed by: INTERNAL MEDICINE

## 2020-06-19 PROCEDURE — 3074F PR MOST RECENT SYSTOLIC BLOOD PRESSURE < 130 MM HG: ICD-10-PCS | Mod: HCNC,CPTII,S$GLB, | Performed by: INTERNAL MEDICINE

## 2020-06-19 PROCEDURE — 77063 BREAST TOMOSYNTHESIS BI: CPT | Mod: 26,HCNC,, | Performed by: RADIOLOGY

## 2020-06-19 PROCEDURE — 1101F PT FALLS ASSESS-DOCD LE1/YR: CPT | Mod: HCNC,CPTII,S$GLB, | Performed by: INTERNAL MEDICINE

## 2020-06-19 PROCEDURE — 99499 NO LOS: ICD-10-PCS | Mod: HCNC,S$GLB,, | Performed by: PODIATRIST

## 2020-06-19 PROCEDURE — 1101F PR PT FALLS ASSESS DOC 0-1 FALLS W/OUT INJ PAST YR: ICD-10-PCS | Mod: HCNC,CPTII,S$GLB, | Performed by: INTERNAL MEDICINE

## 2020-06-19 PROCEDURE — 11721 DEBRIDE NAIL 6 OR MORE: CPT | Mod: Q9,59,HCNC,S$GLB | Performed by: PODIATRIST

## 2020-06-19 PROCEDURE — 99499 UNLISTED E&M SERVICE: CPT | Mod: HCNC,S$GLB,, | Performed by: PODIATRIST

## 2020-06-19 PROCEDURE — 99999 PR PBB SHADOW E&M-EST. PATIENT-LVL II: ICD-10-PCS | Mod: PBBFAC,HCNC,, | Performed by: INTERNAL MEDICINE

## 2020-06-19 PROCEDURE — 99999 PR PBB SHADOW E&M-EST. PATIENT-LVL IV: ICD-10-PCS | Mod: PBBFAC,HCNC,, | Performed by: PODIATRIST

## 2020-06-19 PROCEDURE — 1159F MED LIST DOCD IN RCRD: CPT | Mod: HCNC,S$GLB,, | Performed by: INTERNAL MEDICINE

## 2020-06-19 PROCEDURE — 77067 SCR MAMMO BI INCL CAD: CPT | Mod: 26,HCNC,, | Performed by: RADIOLOGY

## 2020-06-19 PROCEDURE — 77063 MAMMO DIGITAL SCREENING BILAT WITH TOMOSYNTHESIS_CAD: ICD-10-PCS | Mod: 26,HCNC,, | Performed by: RADIOLOGY

## 2020-06-19 PROCEDURE — 77067 MAMMO DIGITAL SCREENING BILAT WITH TOMOSYNTHESIS_CAD: ICD-10-PCS | Mod: 26,HCNC,, | Performed by: RADIOLOGY

## 2020-06-19 PROCEDURE — 3074F SYST BP LT 130 MM HG: CPT | Mod: HCNC,CPTII,S$GLB, | Performed by: INTERNAL MEDICINE

## 2020-06-19 PROCEDURE — 3078F DIAST BP <80 MM HG: CPT | Mod: HCNC,CPTII,S$GLB, | Performed by: INTERNAL MEDICINE

## 2020-06-19 PROCEDURE — 77067 SCR MAMMO BI INCL CAD: CPT | Mod: TC,HCNC

## 2020-06-19 PROCEDURE — 11721 PR DEBRIDEMENT OF NAILS, 6 OR MORE: ICD-10-PCS | Mod: Q9,59,HCNC,S$GLB | Performed by: PODIATRIST

## 2020-06-19 PROCEDURE — 1159F PR MEDICATION LIST DOCUMENTED IN MEDICAL RECORD: ICD-10-PCS | Mod: HCNC,S$GLB,, | Performed by: INTERNAL MEDICINE

## 2020-06-19 RX ORDER — FLUCONAZOLE 200 MG/1
200 TABLET ORAL DAILY
Qty: 14 TABLET | Refills: 0 | Status: SHIPPED | OUTPATIENT
Start: 2020-06-19 | End: 2020-07-03

## 2020-06-19 RX ORDER — OXYCODONE AND ACETAMINOPHEN 10; 325 MG/1; MG/1
TABLET ORAL
Qty: 150 TABLET | Refills: 0 | Status: SHIPPED | OUTPATIENT
Start: 2020-07-06 | End: 2020-07-08 | Stop reason: SDUPTHER

## 2020-06-19 NOTE — PROGRESS NOTES
CHIEF COMPLAINT: Follow up of multiple issues     HISTORY OF PRESENT ILLNESS: This is a 80-year-old woman who presents for follow up of above.    She had worsened neck pain. SHe went to pain management on 6/15/20 and she had some trigger point injections in the left neck and shoulder which has helped. She could not turn her head before the injections.  She is scheduled for a nerve block next week on 6/24/20.  She continues to take oxycodone apap 10/325 3-5 times a day.  She continues to have back pain. She is walking with a cane.  She has been taking methocarbamol 500 mg three times daily and using biofreeze which is helping.  She is also taking duloxetine 60 mg twice daliy       She has had trouble swallowing.  She had a modified barium swallow with speech on 6/10/20. 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 She has had pain on the tongue and a white film on her tongue    SHe has not had abdominal pain, dizziness, syncope, lightheadedness.     She has sinus congestion with phegm caught in her throat. She takes cough syrup and antibiotics without much relief.  She has a dry cough. She wakes up coughing. The cough occurs when she lies down.  She has been taking pantoprazole 40 m g twice daily.  Water helps the cough.  Food can get stuck in her throat at times. THe flonase nasal spray  Helps the food getting stuck in the back of her throat.      She is taking amlodipine 5 mg daily and coreg 25 mg twice daily and lasix 40 mg twice daily for her hypertensino and diastolic dysfunction.      Left knee bothers her at times.  Right knee is fine now.      No chest pain, palpitations, shortness of breath.       She is not taking allopurinol 100 mg daily for gout.       She is having some restless legs at night. She takes roprinole at bedtime     She is taking Lantus 60 units in the evening and Novolog 2 units once or twice daily with meals.  No hypoglycemia.   She is working on her  diet for her diabetes. Blood sugars this morning was 101.     PAST MEDICAL HISTORY:   1. Diabetes mellitus   2. Hypertension   3. Hyperlipidemia   4. Left heart catheterization January 2007 which revealed luminal irregularities in the LAD, left circumflex and right coronary artery. She had diastolic dysfunction and patent renal arteries.   5. Sleep apnea   6. Obesity.   7. Nephrolithiasis status post lithotripsy.   8. Reflux - had nonerosive gastropathy on EGD September 2007. Gastritis on EGD 9/2010  9. Hidradenitis suppurativa.   10. History of diverticulitis with a hospitalized October 2007.   11. Migraines   12. Obesity.   13. 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    14. Total hysterectomy in 1969.   15. Cholecystectomy was done at that time as well.   16. 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   17. Colon polyps on colonscopy 11/2010 - due 2013   18. Hospitalization 12/10 for e coli sepsis due to left ureteral stone with left nephrostomy tube in [place], [place]   19. Left knee replacement 9/2012      MEDICATIONS and ALLERGIES: Updated on EPIC.     PHYSICAL EXAMINATION: /60   Pulse 75   Wt 102.3 kg (225 lb 8.5 oz)   LMP  (LMP Unknown)   SpO2 98%   BMI 34.29 kg/m²        GENERAL: She is alert, oriented, no apparent distress. Affect within   normal limits.  Conjunctiva anicteric. . Oropharynx white tongue. TM clear   NECK: Supple.   Respiratory: Effort normal. Lungs clear  HEART: Regular rate and rhythm without murmurs, gallops or rubs.   No lower extremity edema.    ABDOMEN: soft, non distended, bowel sounds present, no hepatosplenomgaly.          ASSESSMENT AND PLAN:   1.  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 COuld have thrush and yeast  2. Gout - allopurinol 100 mg daily. labs  2.  Mood disorder - stable  duloxetine to 60 mg twice  daily  3.  Neck, shoulder and back pain -follow up with [facility].  4. Asthma - stable   6. HTN -stable  7. Gerd -on pantoprazole 40 mg twice daily -   8. Diabetes with neuropathy- watch sugars- labs  9. Hyperlipidemia - off lipitor  10. Allergic rhinitis - stable  11. Diastolic congestive heart failure - stable  12. CRI -labs  13. Obesity - discussed diet, exercise and weight loss  14. CAD -risk factor modification  15. Aortic atherosclerosis and possible mesenteric artery stenosis- risk factor modification  16. Tortuous aorta - HTN controlled  17. Colon polyps - Colonoscopy in 8/2013 - 2 polyps. Flex sig 11/2013 - mild granular mucosa. Colonoscopy 6/2017 diverticulosis - no polyps due 2023.   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 .Restless leg syndrome - on roprinole    .  MMG 5/19  Prevnar 10/15 and flu shot 11/16  I will see her back in 8 weeks sooner if issues. .

## 2020-06-19 NOTE — Clinical Note
Saw patient today. She has had trouble swallowing.  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

## 2020-06-20 ENCOUNTER — TELEPHONE (OUTPATIENT)
Dept: GASTROENTEROLOGY | Facility: CLINIC | Age: 80
End: 2020-06-20

## 2020-06-20 ENCOUNTER — DOCUMENTATION ONLY (OUTPATIENT)
Dept: GASTROENTEROLOGY | Facility: CLINIC | Age: 80
End: 2020-06-20

## 2020-06-20 DIAGNOSIS — R93.3 ABNORMAL ESOPHAGRAM: ICD-10-CM

## 2020-06-20 DIAGNOSIS — R13.10 DYSPHAGIA, UNSPECIFIED TYPE: ICD-10-CM

## 2020-06-20 DIAGNOSIS — M25.78 CERVICAL OSTEOPHYTE: Primary | ICD-10-CM

## 2020-06-20 NOTE — PROGRESS NOTES
Jeannine Looking at the reading it looks like she has got bony spurs offer spine compressing the esophagus at this area I am not sure dilation is going to improve this and could risk of perforation if done too aggressively against bone we could set her up with esophagram with barium tablet see if the barium tablet hangs up at the cervical osteophytes only true treatment for that if that is the case is surgery to take out those osteophytes which would be very aggressive approach for somebody at age 80 I am not sure you would be able to convince ENT to do that surgery.    Prominent anterior cervical osteophytes resulting in mild mass effect on the esophagus without obstruction.

## 2020-06-21 ENCOUNTER — TELEPHONE (OUTPATIENT)
Dept: INTERNAL MEDICINE | Facility: CLINIC | Age: 80
End: 2020-06-21

## 2020-06-21 DIAGNOSIS — E13.9 DIABETES MELLITUS DUE TO ABNORMAL INSULIN: Primary | ICD-10-CM

## 2020-06-22 NOTE — TELEPHONE ENCOUNTER
Esophagram is still pending review and cannot be scheduled until authorized. Will check back soon to see when it is authorized.

## 2020-06-22 NOTE — TELEPHONE ENCOUNTER
Please notify pt    Your blood sugars are high - averaging 283.    You need to see endocrine for further evaluation of your diabetes. You need to bring all of your medications including insulin to the visit.     Please book her to see endocrine    Jeannine Arriaga M.D.

## 2020-06-24 ENCOUNTER — HOSPITAL ENCOUNTER (OUTPATIENT)
Facility: OTHER | Age: 80
Discharge: HOME | End: 2020-06-24
Attending: ANESTHESIOLOGY | Admitting: ANESTHESIOLOGY
Payer: MEDICARE

## 2020-06-24 VITALS
SYSTOLIC BLOOD PRESSURE: 181 MMHG | WEIGHT: 223 LBS | TEMPERATURE: 99 F | RESPIRATION RATE: 16 BRPM | BODY MASS INDEX: 33.8 KG/M2 | DIASTOLIC BLOOD PRESSURE: 85 MMHG | HEART RATE: 78 BPM | OXYGEN SATURATION: 98 % | HEIGHT: 68 IN

## 2020-06-24 DIAGNOSIS — M47.812 CERVICAL SPONDYLOSIS: Primary | ICD-10-CM

## 2020-06-24 DIAGNOSIS — G89.29 CHRONIC PAIN: ICD-10-CM

## 2020-06-24 LAB — POCT GLUCOSE: 284 MG/DL (ref 70–110)

## 2020-06-24 PROCEDURE — 64491 INJ PARAVERT F JNT C/T 2 LEV: CPT | Mod: 50

## 2020-06-24 PROCEDURE — 64490 PR INJ DX/THER AGNT PARAVERT FACET JOINT,IMG GUIDE,CERV/THORAC, 1ST LEVEL: ICD-10-PCS | Mod: 50,HCNC,, | Performed by: ANESTHESIOLOGY

## 2020-06-24 PROCEDURE — 64491 INJ PARAVERT F JNT C/T 2 LEV: CPT | Mod: 50,HCNC,, | Performed by: ANESTHESIOLOGY

## 2020-06-24 PROCEDURE — 25000003 PHARM REV CODE 250: Mod: HCNC | Performed by: ANESTHESIOLOGY

## 2020-06-24 PROCEDURE — 63600175 PHARM REV CODE 636 W HCPCS: Mod: HCNC | Performed by: ANESTHESIOLOGY

## 2020-06-24 PROCEDURE — 64491 PR INJ DX/THER AGNT PARAVERT FACET JOINT,IMG GUIDE,CERV/THORAC, 2ND LEVEL: ICD-10-PCS | Mod: 50,HCNC,, | Performed by: ANESTHESIOLOGY

## 2020-06-24 PROCEDURE — 64490 INJ PARAVERT F JNT C/T 1 LEV: CPT | Mod: 50,HCNC,, | Performed by: ANESTHESIOLOGY

## 2020-06-24 PROCEDURE — 64490 INJ PARAVERT F JNT C/T 1 LEV: CPT | Mod: 50

## 2020-06-24 RX ORDER — MIDAZOLAM HYDROCHLORIDE 1 MG/ML
INJECTION INTRAMUSCULAR; INTRAVENOUS
Status: DISCONTINUED | OUTPATIENT
Start: 2020-06-24 | End: 2020-06-24 | Stop reason: HOSPADM

## 2020-06-24 RX ORDER — LIDOCAINE HYDROCHLORIDE 10 MG/ML
INJECTION INFILTRATION; PERINEURAL
Status: DISCONTINUED | OUTPATIENT
Start: 2020-06-24 | End: 2020-06-24 | Stop reason: HOSPADM

## 2020-06-24 RX ORDER — SODIUM CHLORIDE 9 MG/ML
500 INJECTION, SOLUTION INTRAVENOUS CONTINUOUS
Status: DISCONTINUED | OUTPATIENT
Start: 2020-06-24 | End: 2020-06-24 | Stop reason: HOSPADM

## 2020-06-24 RX ORDER — BUPIVACAINE HYDROCHLORIDE 2.5 MG/ML
INJECTION, SOLUTION EPIDURAL; INFILTRATION; INTRACAUDAL
Status: DISCONTINUED | OUTPATIENT
Start: 2020-06-24 | End: 2020-06-24 | Stop reason: HOSPADM

## 2020-06-24 RX ADMIN — SODIUM CHLORIDE 500 ML: 0.9 INJECTION, SOLUTION INTRAVENOUS at 08:06

## 2020-06-24 NOTE — OP NOTE
"Patient Name: Henrietta Villasenor  MRN: [id number]    INFORMED CONSENT: The procedure, risks, benefits and options were discussed with patient. There are no contraindications to the procedure. The patient expressed understanding and agreed to proceed. The personnel performing the procedure was discussed. I verify that I personally obtained Henrietta's consent prior to the start of the procedure and the signed consent can be found on the patient's chart.    Procedure Date: 06/24/2020    Anesthesia: Topical    Pre Procedure diagnosis: Cervical spondylosis [M47.812]  1. Cervical spondylosis    2. Chronic pain      Post-Procedure diagnosis: SAME      Sedation: None      PROCEDURE: Bilateral C4, C5 and C6 CERVICAL FACET MEDIAL BRANCH NERVE BLOCK (Prone)        DESCRIPTION OF PROCEDURE: The patient was brought to the procedure room.  After performing time out. IV access was obtained prior to the procedure.  The patient was positioned prone on the fluoroscopy table.  Continuous hemodynamic monitoring was initiated including blood pressure, EKG, and pulse oximetry.  The skin overlying the cervical spine was prepped with chlorhexidine and draped into a sterile field.  Fluoroscopy was used to identify the location of the bilateral side C4 to C6 medial branch nerves.  Skin anesthesia was achieved using 5 cc of Lidocaine 1% over the injection sites. A 25 gauge, 3 1/2" spinal needle was slowly inserted at each level using AP, lateral and oblique fluoroscopic imaging. Final needle position was at the midpoint of the waist of the articular pillars. Negative aspiration for blood or CSF was confirmed. 6 ml bupivacaine 0.25% was injected at all sites..  The needles were removed and bleeding was nil. A sterile dressing was applied.No specimens collected. The patient was brought to recovery in stable condition. Henrietta was taken back to the recovery room for further observation.     Blood Loss: Nill  Specimen: None    The procedure is considered " time sensitive and medically-necessary given the patient's current clinical condition with moderate to severe pain, and/or significant functional impairment, and /or lack of alternative that present less risk of adverse event such as but not limited to a medication-related adverse event or care needed in the emergency department or hospitalization  due to inadequate pain relief.       Oscar Ravi MD

## 2020-06-24 NOTE — DISCHARGE INSTRUCTIONS

## 2020-06-24 NOTE — H&P
HPI  Patient presenting for Procedure(s) (LRB):  BLOCK, NERVE, C4-C5-C6 MEDIAL BRANCH ok per [place] to add on (Bilateral)     Patient on Anti-coagulation No    No health changes since previous encounter    Past Medical History:   Diagnosis Date    Abnormality of lung 11/08/2011    Stable bandlike opacities at the lung bases, most likely representing      Anxiety     Arthritis     CAD (coronary artery disease)     Calculus of ureter 2/22/2011    CHF (congestive heart failure)     Chronic back pain 7/29/2012    Colon polyp 9/2010; 11/2010    Colon polyps 7/29/2012    Coronary artery disease involving native coronary artery of native heart with angina pectoris     Depression     Diabetes mellitus     Diabetes mellitus type II     Diverticulitis large intestine 7/27/2015    Diverticulitis of large intestine without perforation or abscess without bleeding     Diverticulosis 09/25/2010; 11/02/2010; 11/08/2011; 7/29/2012    Duodenal disorder 08/25/2011    Duodenal erosion noted on EGD.    Duodenal ulcer, unspecified as acute or chronic, without hemorrhage, perforation, or obstruction 8/24/2011    E. coli sepsis 12/2010    Due to left ureteral stone with left nephrostomy tube - hospitalized in [place]    Esophageal dysmotility 01/24/2012    Noted on upper GI-barium swallow.    Facial weakness 1969    Left facial weakness s/p left mastoidectomy in ~ 1969.    Fatty liver 11/08/2011    Reported on CT-abdomen and in 06/2012 Gastro clinic visit note.    Gastric polyp 09/29/2010    GERD (gastroesophageal reflux disease) 7/29/2012    Hepatomegaly 11/08/2011    Reported on CT-abdomen    Herpes zoster with other nervous system complications(053.19) 2/28/2011    Hiatal hernia 06/26/2006; 09/29/2010; 08/25/2011    Noted on barium swallow 2006; noted on  EGD 2011.   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    Trouble in sleeping     Type II or unspecified type diabetes mellitus with neurological manifestations, not stated as uncontrolled(250.60)     Type II or unspecified type diabetes mellitus with peripheral circulatory disorders, not stated as uncontrolled(250.70)     Type II or unspecified type diabetes mellitus with renal manifestations, not stated as uncontrolled(250.40)      Past Surgical History:   Procedure Laterality Date    BELPHAROPTOSIS REPAIR      s/p LAMBERTO levator repair - Dr. Dejesus    CARDIAC CATHETERIZATION      CARPAL TUNNEL RELEASE  3/13/2012    CATARACT EXTRACTION W/  INTRAOCULAR LENS IMPLANT Right 10/31/2018        CATARACT EXTRACTION W/  INTRAOCULAR LENS IMPLANT Left 03/22/2018        CHOLECYSTECTOMY      COLONOSCOPY N/A 11/16/2016    Procedure: COLONOSCOPY;  Surgeon: Chris Storey MD;  Location: [address]);  Service: Endoscopy;  Laterality: N/A;    COLONOSCOPY N/A 6/14/2017    Procedure: COLONOSCOPY;  Surgeon: Chris Storey MD;  Location: [address]);  Service: Endoscopy;  Laterality: N/A;  colonoscopy in 3 months with better bowel prep. - Split PEG prep ordered    EXTRACORPOREAL SHOCK WAVE LITHOTRIPSY      INTRAOCULAR PROSTHESES INSERTION Right 10/31/2018    Procedure: INSERTION-INTRAOCULAR LENS (IOL);  Surgeon: Keysha Valle MD;   "Location: [facility] OR;  Service: Ophthalmology;  Laterality: Right;    JOINT REPLACEMENT      mastoid tumor removal  1969    Left mastoidectomy with residual left facial weakness.    NEPHROSTOMY      OOPHORECTOMY      PHACOEMULSIFICATION OF CATARACT Right 10/31/2018    Procedure: PHACOEMULSIFICATION-ASPIRATION-CATARACT;  Surgeon: Keysha Valle MD;  Location: [facility] OR;  Service: Ophthalmology;  Laterality: Right;    TOTAL ABDOMINAL HYSTERECTOMY  1969    TAHBSO    TOTAL KNEE ARTHROPLASTY  9/13/2012    Left    TRANSFORAMINAL EPIDURAL INJECTION OF STEROID Bilateral 9/17/2019    Procedure: INJECTION, STEROID, EPIDURAL, TRANSFORAMINAL APPROACH;  Surgeon: Oscar Ravi MD;  Location: [facility] PAIN [facility]GT;  Service: Pain Management;  Laterality: Bilateral;  B/L TFESI L4/L5     Review of patient's allergies indicates:   Allergen Reactions    Crestor [rosuvastatin]      Cramping in legs    Ezetimibe Diarrhea     Other reaction(s): abdominal pain, Diarrhea      Hydrocodone Itching    Lisinopril      Other reaction(s): cough      Sulfa (sulfonamide antibiotics) Itching and Rash           Sulfamethoxazole     Sulfamethoxazole-trimethoprim     Valsartan Swelling      Current Facility-Administered Medications   Medication    0.9%  NaCl infusion     Facility-Administered Medications Ordered in Other Encounters   Medication    tetracaine HCl (PF) 0.5 % Drop 1 drop       PMHx, PSHx, Allergies, Medications reviewed in epic    ROS negative except pain complaints in HPI    OBJECTIVE:    BP (!) 181/83 (BP Location: Left arm, Patient Position: Lying)   Pulse 83   Temp 98.9 °F (37.2 °C) (Oral)   Resp 16   Ht 5' 8" (1.727 m)   Wt 101.2 kg (223 lb)   LMP  (LMP Unknown)   SpO2 99%   Breastfeeding No   BMI 33.91 kg/m²     PHYSICAL EXAMINATION:    GENERAL: Well appearing, in no acute distress, alert and oriented x3.  PSYCH:  Mood and affect appropriate.  SKIN: Skin color, texture, turgor normal, no rashes or lesions which " will impact the procedure.  CV: RRR with palpation of the radial artery.  PULM: No evidence of respiratory difficulty, symmetric chest rise. Clear to auscultation.  NEURO: Cranial nerves grossly intact.    Plan:    Proceed with procedure as planned Procedure(s) (LRB):  BLOCK, NERVE, C4-C5-C6 MEDIAL BRANCH ok per [place] to add on (Bilateral)    Ramu Baird  06/24/2020

## 2020-06-24 NOTE — DISCHARGE SUMMARY
Discharge Note  Short Stay      SUMMARY     Admit Date: 6/24/2020    Attending Physician: Ramu Baird      Discharge Physician: Ramu Baird      Discharge Date: 6/24/2020 8:36 AM    Procedure(s) (LRB):  BLOCK, NERVE, C4-C5-C6 MEDIAL BRANCH ok per [place] to add on (Bilateral)    Final Diagnosis: Cervical spondylosis [M47.812]    Disposition: Home or self care    Patient Instructions:   Current Discharge Medication List      CONTINUE these medications which have NOT CHANGED    Details   !! ACCU-CHEK SMARTVIEW TEST STRIP Strp TEST THREE TIMES DAILY  Qty: 300 strip, Refills: 3      albuterol (VENTOLIN HFA) 90 mcg/actuation inhaler Inhale 2 puffs by mouth into the lungs every 4 (four) hours as needed for Wheezing. Rescue  Qty: 18 g, Refills: 1    Associated Diagnoses: Shortness of breath      !! ALCOHOL ANTISEPTIC PADS (ALCOHOL PREP PADS TOP)       allopurinol (ZYLOPRIM) 100 MG tablet Take 1 tablet (100 mg total) by mouth once daily.  Qty: 90 tablet, Refills: 3      amLODIPine (NORVASC) 5 MG tablet Take 1 tablet (5 mg total) by mouth once daily.  Qty: 90 tablet, Refills: 3    Associated Diagnoses: Coronary artery disease involving native coronary artery of native heart with angina pectoris; Essential hypertension; Hypercholesterolemia; Chronic diastolic congestive heart failure; Type 2 diabetes mellitus with diabetic polyneuropathy, with long-term current use of insulin; MARLON on CPAP; Chest pain, unspecified type; Syncope and collapse      aspirin (ECOTRIN) 81 MG EC tablet Take 1 tablet by mouth Daily.      atorvastatin (LIPITOR) 40 MG tablet Take 1 tablet (40 mg total) by mouth once daily.  Qty: 90 tablet, Refills: 3    Associated Diagnoses: Coronary artery disease involving native coronary artery of native heart with angina pectoris; Essential hypertension; Hypercholesterolemia; Type 2 diabetes mellitus with diabetic polyneuropathy, with long-term current use of insulin; Chest pain, unspecified type; Syncope and  "collapse      azelastine (ASTELIN) 137 mcg (0.1 %) nasal spray Spray 1 spray (137 mcg total) by Nasal route 2 (two) times daily.  Qty: 30 mL, Refills: 3      !! BD ALCOHOL SWABS PadM USE  WHEN  TESTING BLOOD SUGAR FOUR TIMES DAILY  Qty: 400 each, Refills: 4      BD ULTRA-FINE OLU PEN NEEDLES 32 x 5/32 " Ndle Uses 4 times daily, on multiple daily insulin injections  Qty: 130 each, Refills: 12    Associated Diagnoses: Type 2 diabetes mellitus with diabetic polyneuropathy      !! blood sugar diagnostic Strp One Touch verio test strips - check blood sugar three times daily  Qty: 150 strip, Refills: 4    Associated Diagnoses: Type 2 diabetes mellitus with other circulatory complication, with long-term current use of insulin      blood-glucose meter kit Use as instructed  Qty: 1 each, Refills: 0    Comments: Accu check smartview meter to go with accu check smartview test strips. Check blood sugar once daily.  Associated Diagnoses: Type 2 diabetes mellitus with other circulatory complication, with long-term current use of insulin      carbamide peroxide (EAR WAX REMOVAL DROPS OTIC)       carvediloL (COREG) 25 MG tablet Take 1 tablet (25 mg total) by mouth 2 (two) times daily.  Qty: 180 tablet, Refills: 3    Comments: .      cholecalciferol, vitamin D3, (DIALYVITE VITAMIN D) 5,000 unit capsule Take 5,000 Units by mouth once daily.      DULoxetine (CYMBALTA) 60 MG capsule Take 1 capsule (60 mg total) by mouth 2 (two) times daily.  Qty: 180 capsule, Refills: 3    Associated Diagnoses: Diabetic polyneuropathy associated with type 2 diabetes mellitus      fluconazole (DIFLUCAN) 200 MG Tab Take 1 tablet (200 mg total) by mouth once daily for 14 days  Qty: 14 tablet, Refills: 0    Associated Diagnoses: Diabetes mellitus due to abnormal insulin      fluticasone (FLONASE) 50 mcg/actuation nasal spray SHAKE LQ AND U 2 SPRAYS IEN QD  Qty: 1 Bottle, Refills: 6      furosemide (LASIX) 40 MG tablet TAKE 1 TABLET BY MOUTH TWICE " "DAILY  Qty: 180 tablet, Refills: 4      insulin (LANTUS SOLOSTAR U-100 INSULIN) glargine 100 units/mL (3mL) SubQ pen Inject 55-60 units at night.  Qty: 60 mL, Refills: 1    Associated Diagnoses: Type 2 diabetes mellitus with diabetic polyneuropathy, with long-term current use of insulin      insulin aspart (NOVOLOG) 100 unit/mL InPn pen 23 units with meals plus correction scale. Max daily dose=120 units  Qty: 8 Box, Refills: 3    Associated Diagnoses: Diabetic polyneuropathy associated with type 2 diabetes mellitus      !! lancets (ACCU-CHEK FASTCLIX LANCING DEV) Misc Lancets and device = check blood sugar 4 times daily  Qty: 400 each, Refills: 4      loratadine (CLARITIN) 10 mg tablet Take 1 tablet (10 mg total) by mouth once daily.  Qty: 30 tablet, Refills: 4    Associated Diagnoses: Allergic rhinitis      meclizine (ANTIVERT) 12.5 mg tablet Take 1 tablet (12.5 mg total) by mouth 3 (three) times daily as needed for Dizziness.  Qty: 30 tablet, Refills: 0      olopatadine (PATADAY) 0.2 % Drop Place 1 drop into both eyes once daily.  Qty: 2.5 mL, Refills: 12      oxyCODONE-acetaminophen (PERCOCET)  mg per tablet Take one tablet by mouth 5 times daily  Qty: 150 tablet, Refills: 0    Comments: Quantity prescribed more than 7 day supply? Yes, quantity medically necessary      pantoprazole (PROTONIX) 40 MG tablet TAKE 1 TABLET(40 MG) BY MOUTH twice daily  Qty: 180 tablet, Refills: 4    Comments: **Patient requests 90 days supply**      pen needle, diabetic (SURE-FINE PEN NEEDLES) 29 gauge x 1/2" Ndle Use 4 times daily  Qty: 400 each, Refills: 4      rOPINIRole (REQUIP) 0.5 MG tablet TAKE 1 TABLET (0.5 MG TOTAL) BY MOUTH EVERY EVENING.  Qty: 90 tablet, Refills: 1      triamcinolone acetonide 0.1% (KENALOG) 0.1 % cream Apply topically 3 (three) times daily.  Qty: 28.4 g, Refills: 0    Associated Diagnoses: Allergic reaction, initial encounter      !! TRUEPLUS LANCETS 33 gauge Misc TEST BLOOD SUGAR FOUR TIMES " DAILY  Qty: 400 each, Refills: 4       !! - Potential duplicate medications found. Please discuss with provider.              Discharge Diagnosis: Cervical spondylosis [M47.812]  Condition on Discharge: Stable with no complications to procedure   Diet on Discharge: Same as before.  Activity: as per instruction sheet.  Discharge to: Home with a responsible adult.  Follow up: 2-4 weeks       Please call my office or pager at [phone] if experienced any weakness or loss of sensation, fever > 101.5, pain uncontrolled with oral medications, persistent nausea/vomiting/or diarrhea, redness or drainage from the incisions, or any other worrisome concerns. If physician on call was not reached or could not communicate with our office for any reason please go to the nearest emergency department

## 2020-06-25 ENCOUNTER — TELEPHONE (OUTPATIENT)
Dept: ENDOSCOPY | Facility: HOSPITAL | Age: 80
End: 2020-06-25

## 2020-06-26 ENCOUNTER — TELEPHONE (OUTPATIENT)
Dept: INTERNAL MEDICINE | Facility: CLINIC | Age: 80
End: 2020-06-26

## 2020-06-27 NOTE — TELEPHONE ENCOUNTER
----- Message from Chris Storey MD sent at 6/20/2020  9:05 AM CDT -----  Looking at the reading it looks like she has got bony spurs offer spine compressing the esophagus at this area I am not sure dilation is going to improve this and could risk of perforation if done too aggressively against bone we could set her up with esophagram with barium tablet see if the barium tablet hangs up at the cervical osteophytes only true treatment for that if that is the case is surgery to take out those osteophytes which would be very aggressive approach for somebody at age 80 I am not sure you would be able to convince ENT to do that surgery.    Prominent anterior cervical osteophytes resulting in mild mass effect on the esophagus without obstruction.  ----- Message -----  From: Jeannine Arriaga MD  Sent: 6/19/2020  11:57 AM CDT  To: Chris Storey MD    Saw patient today. She has had trouble swallowing.  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

## 2020-07-01 DIAGNOSIS — Z79.4 TYPE 2 DIABETES MELLITUS WITH STAGE 3 CHRONIC KIDNEY DISEASE, WITH LONG-TERM CURRENT USE OF INSULIN: Primary | ICD-10-CM

## 2020-07-01 DIAGNOSIS — E11.22 TYPE 2 DIABETES MELLITUS WITH STAGE 3 CHRONIC KIDNEY DISEASE, WITH LONG-TERM CURRENT USE OF INSULIN: Primary | ICD-10-CM

## 2020-07-01 DIAGNOSIS — N18.30 TYPE 2 DIABETES MELLITUS WITH STAGE 3 CHRONIC KIDNEY DISEASE, WITH LONG-TERM CURRENT USE OF INSULIN: Primary | ICD-10-CM

## 2020-07-01 RX ORDER — ISOPROPYL ALCOHOL 0.75 G/1
SWAB TOPICAL
Qty: 400 EACH | Refills: 3 | Status: SHIPPED | OUTPATIENT
Start: 2020-07-01 | End: 2021-02-09 | Stop reason: SDUPTHER

## 2020-07-01 NOTE — PROGRESS NOTES
Subjective:      Patient ID: Henrietta Villasenor is a 80 y.o. female.    Chief Complaint: Diabetes Mellitus (PCP 06/19/2020) and Foot Pain    Henrietta is a 80 y.o. female who presents to the clinic for evaluation and treatment of high risk feet. Henrietta has a past medical history of Abnormality of lung (11/08/2011), Anxiety, Arthritis, CAD (coronary artery disease), Calculus of ureter (2/22/2011), CHF (congestive heart failure), Chronic back pain (7/29/2012), Colon polyp (9/2010; 11/2010), Colon polyps (7/29/2012), Coronary artery disease involving native coronary artery of native heart with angina pectoris, Depression, Diabetes mellitus, Diabetes mellitus type II, Diverticulitis large intestine (7/27/2015), Diverticulitis of large intestine without perforation or abscess without bleeding, Diverticulosis (09/25/2010; 11/02/2010; 11/08/2011; 7/29/2012), Duodenal disorder (08/25/2011), Duodenal ulcer, unspecified as acute or chronic, without hemorrhage, perforation, or obstruction (8/24/2011), E. 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), 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), Obesity, MARLON (obstructive sleep apnea), Pain, Peripheral neuropathy, Pneumonia, Polyneuropathy, Postherpetic neuralgia, Recurrent nephrolithiasis, S/P knee replacement (10/2/2012), S/P TKR (total knee replacement) (12/26/2012), Sensorineural hearing loss of both ears, Thyroid disease (11/08/2011),  Trouble in sleeping, Type II or unspecified type diabetes mellitus with neurological manifestations, not stated as uncontrolled(250.60), Type II or unspecified type diabetes mellitus with peripheral circulatory disorders, not stated as uncontrolled(250.70), and Type II or unspecified type diabetes mellitus with renal manifestations, not stated as uncontrolled(250.40). The patient's chief complaint is long, thick toenails. This patient has documented high risk feet requiring routine maintenance secondary to diabetes mellitis and those secondary complications of diabetes, as mentioned..       PCP: Jeannine Arriaga MD    Date Last Seen by PCP:   Chief Complaint   Patient presents with    Diabetes Mellitus     PCP 06/19/2020    Foot Pain         Current shoe gear: casual shoes    Hemoglobin A1C   Date Value Ref Range Status   06/19/2020 10.8 (H) 4.0 - 5.6 % Final     Comment:     ADA Screening Guidelines:  5.7-6.4%  Consistent with prediabetes  >or=6.5%  Consistent with diabetes  High levels of fetal hemoglobin interfere with the HbA1C  assay. Heterozygous hemoglobin variants (HbS, HgC, etc)do  not significantly interfere with this assay.   However, presence of multiple variants may affect accuracy.     03/11/2020 10.5 (H) 4.0 - 5.6 % Final     Comment:     ADA Screening Guidelines:  5.7-6.4%  Consistent with prediabetes  >or=6.5%  Consistent with diabetes  High levels of fetal hemoglobin interfere with the HbA1C  assay. Heterozygous hemoglobin variants (HbS, HgC, etc)do  not significantly interfere with this assay.   However, presence of multiple variants may affect accuracy.     11/18/2019 9.0 (H) 4.0 - 5.6 % Final     Comment:     ADA Screening Guidelines:  5.7-6.4%  Consistent with prediabetes  >or=6.5%  Consistent with diabetes  High levels of fetal hemoglobin interfere with the HbA1C  assay. Heterozygous hemoglobin variants (HbS, HgC, etc)do  not significantly interfere with this assay.   However, presence of  multiple variants may affect accuracy.         Review of Systems   Constitution: Negative for chills, decreased appetite and fever.   Cardiovascular: Negative for leg swelling.   Skin: Positive for color change, dry skin and nail changes. Negative for flushing and itching.   Musculoskeletal: Positive for arthritis. Negative for back pain, falls, joint pain, joint swelling, muscle cramps and myalgias.   Gastrointestinal: Negative for nausea and vomiting.   Neurological: Positive for numbness and paresthesias. Negative for loss of balance.           Objective:       Vitals:    06/19/20 1515   BP: (!) 144/85   Weight: 104.6 kg (230 lb 9.6 oz)        Physical Exam  Vitals signs and nursing note reviewed.   Constitutional:       Appearance: She is well-developed.   Cardiovascular:      Comments: Dorsalis pedis and posterior tibial pulses are diminished Bilaterally. Toes are cool to touch. Feet are warm proximally.There is decreased digital hair. Skin is atrophic, slightly hyperpigmented, and mildly edematous      Musculoskeletal: Normal range of motion.         General: No tenderness.      Comments: Adequate joint range of motion without pain, limitation, nor crepitation Bilateral feet and ankle joints. Muscle strength is 5/5 in all groups bilaterally.      semi reducible IPJ digital contracture 2nd toe b/l    Skin:     General: Skin is warm and dry.      Findings: No abrasion, bruising, burn, ecchymosis, erythema or lesion.      Comments: Nails x 10  are elongated by  4-8mm's, thickened by 2-3 mm's, dystrophic, and are darkened in  coloration . Xerosis Bilaterally. No open lesions noted.    Hyperkeratotic tissue noted to distal toes 2and 4 b/l        Neurological:      Mental Status: She is alert and oriented to person, place, and time.      Comments: [place] 5.07 monofilamant testing is diminished Mynor feet. Sharp/dull sensation diminished Bilaterally. Light touch absent Bilaterally.       Psychiatric:          Behavior: Behavior normal.               Assessment:       Encounter Diagnoses   Name Primary?    Diabetic polyneuropathy associated with type 2 diabetes mellitus Yes    Corn or callus     Onychomycosis due to dermatophyte          Plan:       Henrietta was seen today for diabetes mellitus and foot pain.    Diagnoses and all orders for this visit:    Diabetic polyneuropathy associated with type 2 diabetes mellitus    Corn or callus    Onychomycosis due to dermatophyte      I counseled the patient on her conditions, their implications and medical management.    Shoe inspection. Diabetic Foot Education. Patient reminded of the importance of good nutrition and blood sugar control to help prevent podiatric complications of diabetes. Patient instructed on proper foot hygeine. We discussed wearing proper shoe gear, daily foot inspections, never walking without protective shoe gear, never putting sharp instruments to feet    - With patient's permission, nails were aggressively reduced and debrided x 10 to their soft tissue attachment mechanically and with electric , removing all offending nail and debris. Patient relates relief following the procedure. She will continue to monitor the areas daily, inspect her feet, wear protective shoe gear when ambulatory, moisturizer to maintain skin integrity and follow in this office in approximately 2-3 months, sooner p.r.n.    - After cleansing the  area w/ alcohol prep pad the above mentioned hyperkeratosis was trimmed utilizing No 15 scapel, to a smooth base with out incident. Patient tolerated this  well and reported comfort to the area of distal toes 2 and 4 b/l   .

## 2020-07-02 PROBLEM — Z78.0 POSTMENOPAUSAL: Status: ACTIVE | Noted: 2020-07-02

## 2020-07-02 NOTE — PROGRESS NOTES
Refill Authorization Note    is requesting a refill authorization.    Brief assessment and rationale for refill: APPROVE: prr          Medication Therapy Plan: approve 12 more    Medication reconciliation completed: No                         Comments:      Requested Prescriptions   Signed Prescriptions Disp Refills    BD ALCOHOL SWABS PadM 400 each 3     Sig: USE  WHEN  TESTING BLOOD SUGAR FOUR TIMES DAILY       There is no refill protocol information for this order         Appointments  past 12m or future 3m with PCP    Date Provider   Last Visit   6/19/2020 Jeannine Arriaga MD   Next Visit   9/11/2020 Jeannine Arriaga MD   ED visits in past 90 days: 0     Note composed:12:00 AM 07/02/2020

## 2020-07-06 NOTE — PROGRESS NOTES
Subjective:      Patient ID: Henrietta Villasenor is a 80 y.o. female.    Chief Complaint:  Diabetes    History of Present Illness  Henrietta Villasenor is here for follow up of T2DM, hyperpara, vit D def, and osteopenia.  Previously seen by SRI Flores NP.  Last seen 2017.  This is their first visit with me.      With regards to diabetes:    Diagnosed:  Late   Known complications:  DKA -  RN +  PN +  Nephropathy +  CAD +    Current regimen:  Lantus 60units nightly  Novolog 23units TIDAC     Other medications tried:  MFM - CKD    Glucose Monitor: Just got a new meter  1 times a day testing  Log reviewed: oral recall  Fastin, 218    Hypoglycemia:  None.  Knows how to correct with 15 grams of carbs- juice, coke, or a peppermint.     Diet/Exercise:  Eats 2-3 meals a day.   B: coffee, oatmeal, toast   L: banana, bread, diet coke  D: meat/ red beans, rice  Snacks : granola bar  Drinks : water and diet coke.   Exercise: None.       Education - last visit: 2018  Eye Exam: 2020  Podiatry: 2020    Denies history of pancreatitis & personal/family history of medullary thyroid cancer.     Diabetes Management Status    Hemoglobin A1C   Date Value Ref Range Status   2020 10.8 (H) 4.0 - 5.6 % Final     Comment:     ADA Screening Guidelines:  5.7-6.4%  Consistent with prediabetes  >or=6.5%  Consistent with diabetes  High levels of fetal hemoglobin interfere with the HbA1C  assay. Heterozygous hemoglobin variants (HbS, HgC, etc)do  not significantly interfere with this assay.   However, presence of multiple variants may affect accuracy.     2020 10.5 (H) 4.0 - 5.6 % Final     Comment:     ADA Screening Guidelines:  5.7-6.4%  Consistent with prediabetes  >or=6.5%  Consistent with diabetes  High levels of fetal hemoglobin interfere with the HbA1C  assay. Heterozygous hemoglobin variants (HbS, HgC, etc)do  not significantly interfere with this assay.   However, presence of multiple variants may affect accuracy.      11/18/2019 9.0 (H) 4.0 - 5.6 % Final     Comment:     ADA Screening Guidelines:  5.7-6.4%  Consistent with prediabetes  >or=6.5%  Consistent with diabetes  High levels of fetal hemoglobin interfere with the HbA1C  assay. Heterozygous hemoglobin variants (HbS, HgC, etc)do  not significantly interfere with this assay.   However, presence of multiple variants may affect accuracy.         Statin: Taking  ACE/ARB: Not taking  Screening or Prevention Patient's value Goal Complete/Controlled?   HgA1C Testing and Control   Lab Results   Component Value Date    HGBA1C 10.8 (H) 06/19/2020      Annually/Less than 8% No   Lipid profile : 02/20/2020 Annually Yes   LDL control Lab Results   Component Value Date    LDLCALC 95.0 02/20/2020    Annually/Less than 100 mg/dl  Yes   Nephropathy screening Lab Results   Component Value Date    LABMICR 41.0 05/26/2017     Lab Results   Component Value Date    PROTEINUA Negative 01/02/2020    Annually Yes   Blood pressure BP Readings from Last 1 Encounters:   07/07/20 128/82    Less than 140/90 No   Dilated retinal exam : 01/27/2020 Annually Yes   Foot exam   : 05/16/2019 Annually No     With regards to the hyperparathyroidism:    Secondary to renal.     Lab Results   Component Value Date    .0 (H) 08/08/2017    CALCIUM 9.1 06/19/2020    PHOS 3.9 01/19/2018     Lab Results   Component Value Date    ALBUMIN 3.4 (L) 06/19/2020     With regards to Vitamin D Deficiency:    Vit D, 25-Hydroxy   Date Value Ref Range Status   06/19/2020 64 30 - 96 ng/mL Final     Comment:     Vitamin D deficiency.........<10 ng/mL                              Vitamin D insufficiency......10-29 ng/mL       Vitamin D sufficiency........> or equal to 30 ng/mL  Vitamin D toxicity............>100 ng/mL       Current Meds: OTC VIt D3 1000iu daily    With regards to osteopenia:    BMD:  6/12/18  Osteopenia of the hips and normal bone density of the spine.    Fractures : denies    Calcium : None  Vitamin D : OTC  "Vit D3 1000iu daily     Review of Systems   Constitutional: Negative for fatigue.   Eyes: Negative for visual disturbance.   Respiratory: Negative for shortness of breath.    Cardiovascular: Negative for chest pain.   Gastrointestinal: Negative for abdominal pain.   Musculoskeletal: Positive for back pain. Negative for arthralgias.   Skin: Negative for wound.   Neurological: Negative for headaches.   Hematological: Does not bruise/bleed easily.   Psychiatric/Behavioral: Negative for sleep disturbance.     Objective:   Physical Exam  Vitals signs reviewed.   Neck:      Thyroid: No thyromegaly (irregular shaped gland).   Cardiovascular:      Rate and Rhythm: Normal rate.      Comments: No edema present  Pulmonary:      Effort: Pulmonary effort is normal.   Abdominal:      Palpations: Abdomen is soft.       Visit Vitals  /82 (BP Location: Right arm, Patient Position: Sitting, BP Method: Medium (Manual))   Resp 16   Ht 5' 8" (1.727 m)   Wt 98.2 kg (216 lb 7.9 oz)   LMP  (LMP Unknown)   BMI 32.92 kg/m²     Thyroid US 2011  Micronodular thyroid.  None of the nodules warrant FNA.    Injection sites are without edema or erythema. No lipo hypertropthy or atrophy.    Body mass index is 32.92 kg/m².    Lab Review:   Lab Results   Component Value Date    HGBA1C 10.8 (H) 06/19/2020    HGBA1C 10.5 (H) 03/11/2020    HGBA1C 9.0 (H) 11/18/2019       Lab Results   Component Value Date    CHOL 175 02/20/2020    HDL 52 02/20/2020    LDLCALC 95.0 02/20/2020    TRIG 140 02/20/2020    CHOLHDL 29.7 02/20/2020     Lab Results   Component Value Date     06/19/2020    K 3.6 06/19/2020     06/19/2020    CO2 30 (H) 06/19/2020     (H) 06/19/2020    BUN 20 06/19/2020    CREATININE 1.4 06/19/2020    CALCIUM 9.1 06/19/2020    PROT 7.7 06/19/2020    ALBUMIN 3.4 (L) 06/19/2020    BILITOT 0.3 06/19/2020    ALKPHOS 87 06/19/2020    AST 26 06/19/2020    ALT 19 06/19/2020    ANIONGAP 11 06/19/2020    ESTGFRAFRICA 40.9 (A) " 06/19/2020    EGFRNONAA 35.5 (A) 06/19/2020    TSH 2.676 06/19/2020     Vit D, 25-Hydroxy   Date Value Ref Range Status   06/19/2020 64 30 - 96 ng/mL Final     Comment:     Vitamin D deficiency.........<10 ng/mL                              Vitamin D insufficiency......10-29 ng/mL       Vitamin D sufficiency........> or equal to 30 ng/mL  Vitamin D toxicity............>100 ng/mL       Assessment and Plan     1. Type 2 diabetes mellitus with diabetic polyneuropathy, with long-term current use of insulin     2. Hyperparathyroidism  DXA Bone Density Spine And Hip   3. Hypovitaminosis D     4. Osteopenia, unspecified location  DXA Bone Density Spine And Hip   5. Stage 3 chronic kidney disease  Ambulatory referral/consult to Nephrology   6. Coronary artery disease involving native coronary artery of native heart with angina pectoris     7. Chronic diastolic congestive heart failure     8. Essential hypertension     9. Hypercholesterolemia     10. Diabetic polyneuropathy associated with type 2 diabetes mellitus     11. Diabetes mellitus due to abnormal insulin  Ambulatory referral/consult to Endocrinology   12. Dysphagia, unspecified type  US Soft Tissue Head Neck Thyroid     Type 2 diabetes mellitus with diabetic polyneuropathy, with long-term current use of insulin  -- A1c goal 7.5-8.0%.  -- Medications discussed:  MFM - discontinued due to CKD in the past  SGLT2 - refused  Insulin   -- Reviewed logs/CGM:  Fasting hyperglycemia.    Not checking glucose during the day.  Suspicious of global hyperglycemia given A1c.  Discussed the only way to make adjustments safely is for her to check and document glucose.  Instructed to check glucose before meals and before bed.    Instructed to send glucose logs in 14 days.  Reach out to me sooner for any glucose <70 or consistently >250.  -- Medication Changes:   Insufficient data to make medication changes today - close follow up  CONTINUE:  Lantus 60units nightly  Novolog 23units  TIDAC   -- Reviewed goals of therapy are to get the best control we can without hypoglycemia.  -- Reviewed patient's current insulin regimen. Clarified proper insulin dose and timing in relation to meals, etc. Insulin injection sites and proper rotation instructed.    -- Advised frequent self blood glucose monitoring.  Patient encouraged to document glucose results and bring them to every clinic visit.  -- Hypoglycemia precautions discussed. Instructed on precautions before driving.    -- Call for Bg repeatedly < 90 or > 180.   -- Close adherence to lifestyle changes recommended.   -- Periodic follow ups for eye evaluations, foot care and dental care suggested.    Hyperparathyroidism  -- Secondary to renal. Calcium unremarkable.     Hypovitaminosis D  -- On Vit D3 1000iu daily.    Stage 3 chronic kidney disease  -- Lost to follow up with nephrology. Refer back.     Coronary artery disease involving native coronary artery of native heart with angina pectoris  -- Optimize glucose control.     Diastolic congestive heart failure  -- Continue following with cardiology.    Essential hypertension  -- Controlled on current medications.     Hypercholesterolemia  -- Controlled.  -- On statin per ADA recommendations.    Diabetic neuropathy associated with type 2 diabetes mellitus  -- Optimize glucose control.     Osteopenia  -- Schedule BMD.    Follow up in about 2 weeks (around 7/21/2020).    Thyroid US

## 2020-07-07 ENCOUNTER — NURSE TRIAGE (OUTPATIENT)
Dept: ADMINISTRATIVE | Facility: CLINIC | Age: 80
End: 2020-07-07

## 2020-07-07 ENCOUNTER — OFFICE VISIT (OUTPATIENT)
Dept: ENDOCRINOLOGY | Facility: CLINIC | Age: 80
End: 2020-07-07
Payer: MEDICARE

## 2020-07-07 VITALS
SYSTOLIC BLOOD PRESSURE: 128 MMHG | DIASTOLIC BLOOD PRESSURE: 82 MMHG | HEIGHT: 68 IN | WEIGHT: 216.5 LBS | BODY MASS INDEX: 32.81 KG/M2 | RESPIRATION RATE: 16 BRPM

## 2020-07-07 DIAGNOSIS — I10 ESSENTIAL HYPERTENSION: Chronic | ICD-10-CM

## 2020-07-07 DIAGNOSIS — E11.42 DIABETIC POLYNEUROPATHY ASSOCIATED WITH TYPE 2 DIABETES MELLITUS: ICD-10-CM

## 2020-07-07 DIAGNOSIS — E55.9 HYPOVITAMINOSIS D: ICD-10-CM

## 2020-07-07 DIAGNOSIS — Z79.4 TYPE 2 DIABETES MELLITUS WITH DIABETIC POLYNEUROPATHY, WITH LONG-TERM CURRENT USE OF INSULIN: Primary | ICD-10-CM

## 2020-07-07 DIAGNOSIS — E13.9 DIABETES MELLITUS DUE TO ABNORMAL INSULIN: ICD-10-CM

## 2020-07-07 DIAGNOSIS — I50.32 CHRONIC DIASTOLIC CONGESTIVE HEART FAILURE: ICD-10-CM

## 2020-07-07 DIAGNOSIS — E21.3 HYPERPARATHYROIDISM: ICD-10-CM

## 2020-07-07 DIAGNOSIS — I25.119 CORONARY ARTERY DISEASE INVOLVING NATIVE CORONARY ARTERY OF NATIVE HEART WITH ANGINA PECTORIS: ICD-10-CM

## 2020-07-07 DIAGNOSIS — E78.00 HYPERCHOLESTEROLEMIA: ICD-10-CM

## 2020-07-07 DIAGNOSIS — N18.30 STAGE 3 CHRONIC KIDNEY DISEASE: ICD-10-CM

## 2020-07-07 DIAGNOSIS — R13.10 DYSPHAGIA, UNSPECIFIED TYPE: ICD-10-CM

## 2020-07-07 DIAGNOSIS — M85.80 OSTEOPENIA, UNSPECIFIED LOCATION: ICD-10-CM

## 2020-07-07 DIAGNOSIS — E11.42 TYPE 2 DIABETES MELLITUS WITH DIABETIC POLYNEUROPATHY, WITH LONG-TERM CURRENT USE OF INSULIN: Primary | ICD-10-CM

## 2020-07-07 PROCEDURE — 3074F SYST BP LT 130 MM HG: CPT | Mod: HCNC,CPTII,S$GLB, | Performed by: NURSE PRACTITIONER

## 2020-07-07 PROCEDURE — 3074F PR MOST RECENT SYSTOLIC BLOOD PRESSURE < 130 MM HG: ICD-10-PCS | Mod: HCNC,CPTII,S$GLB, | Performed by: NURSE PRACTITIONER

## 2020-07-07 PROCEDURE — 99499 UNLISTED E&M SERVICE: CPT | Mod: HCNC,S$GLB,, | Performed by: NURSE PRACTITIONER

## 2020-07-07 PROCEDURE — 1101F PR PT FALLS ASSESS DOC 0-1 FALLS W/OUT INJ PAST YR: ICD-10-PCS | Mod: HCNC,CPTII,S$GLB, | Performed by: NURSE PRACTITIONER

## 2020-07-07 PROCEDURE — 1126F AMNT PAIN NOTED NONE PRSNT: CPT | Mod: HCNC,S$GLB,, | Performed by: NURSE PRACTITIONER

## 2020-07-07 PROCEDURE — 99214 PR OFFICE/OUTPT VISIT, EST, LEVL IV, 30-39 MIN: ICD-10-PCS | Mod: HCNC,S$GLB,, | Performed by: NURSE PRACTITIONER

## 2020-07-07 PROCEDURE — 1159F MED LIST DOCD IN RCRD: CPT | Mod: HCNC,S$GLB,, | Performed by: NURSE PRACTITIONER

## 2020-07-07 PROCEDURE — 1101F PT FALLS ASSESS-DOCD LE1/YR: CPT | Mod: HCNC,CPTII,S$GLB, | Performed by: NURSE PRACTITIONER

## 2020-07-07 PROCEDURE — 3079F PR MOST RECENT DIASTOLIC BLOOD PRESSURE 80-89 MM HG: ICD-10-PCS | Mod: HCNC,CPTII,S$GLB, | Performed by: NURSE PRACTITIONER

## 2020-07-07 PROCEDURE — 99999 PR PBB SHADOW E&M-EST. PATIENT-LVL V: ICD-10-PCS | Mod: PBBFAC,HCNC,, | Performed by: NURSE PRACTITIONER

## 2020-07-07 PROCEDURE — 1159F PR MEDICATION LIST DOCUMENTED IN MEDICAL RECORD: ICD-10-PCS | Mod: HCNC,S$GLB,, | Performed by: NURSE PRACTITIONER

## 2020-07-07 PROCEDURE — 99214 OFFICE O/P EST MOD 30 MIN: CPT | Mod: HCNC,S$GLB,, | Performed by: NURSE PRACTITIONER

## 2020-07-07 PROCEDURE — 99999 PR PBB SHADOW E&M-EST. PATIENT-LVL V: CPT | Mod: PBBFAC,HCNC,, | Performed by: NURSE PRACTITIONER

## 2020-07-07 PROCEDURE — 1126F PR PAIN SEVERITY QUANTIFIED, NO PAIN PRESENT: ICD-10-PCS | Mod: HCNC,S$GLB,, | Performed by: NURSE PRACTITIONER

## 2020-07-07 PROCEDURE — 99499 RISK ADDL DX/OHS AUDIT: ICD-10-PCS | Mod: HCNC,S$GLB,, | Performed by: NURSE PRACTITIONER

## 2020-07-07 PROCEDURE — 3079F DIAST BP 80-89 MM HG: CPT | Mod: HCNC,CPTII,S$GLB, | Performed by: NURSE PRACTITIONER

## 2020-07-07 NOTE — PATIENT INSTRUCTIONS
Instructed to send glucose logs in 14 days.  Reach out to me sooner for any glucose <70 or consistently >250.    Hypoglycemia (Low Blood Glucose)  Low blood glucose occurs with the following conditions.  · Not Enough Food or Missing Meal.  · Too Much Insulin  · More Exercise Than Usual    It is best to use something that you can always carry with you. Choose a food that is all carbohydrate because it will be very fast acting. Try not to choose chocolate or other high fat foods. They will not work fast enough and you may also end up over-treating your lows. The suggested amount of carbohydrate to start with is 15 grams. Don't keep eating until you feel better. Eat the required amount and stop. The feelings will pass and you will be grateful that you did not overdo it.    Some people with diabetes know when their blood glucose is low and some do not. If you are a person who is not aware of hypoglycemia, it is important to test your blood glucose more often. Everyone with diabetes should test before driving a car to assure safety on the road. Blood glucose should be above 100 mg/dl before driving and at bedtime.     The symptoms of low blood sugars are usually heart racing, sweating, anxiety, feeling hungry, tremor, weakness or most severely loss of consciousness.     Rule of 15:    Test your blood sugar   If glucose is between 50-70 mg/dL then ingest 15 grams of fast-acting carbs   If glucose is less than 50 mg/dL then ingest 30 grams of fast-acting carbs   Ingest 15 grams of fast-acting carbohydrate - such as:   a. 3-4 glucose tablets  b. 4 oz juice  c. ½ can regular soda pop  d. 15 skittles or mini jelly beans    Re-check your blood sugar in 15 minutes. If its less than 70mg/dl, repeat steps 2 and 3.   If your next meal is more than 1 hour away, eat an additional 15 grams of carbohydrate and 1 ounce of protein (examples include crackers with cheese or one-half of a sandwich with peanut butter). It is important not  to eat too much because this can raise your blood sugar above the target level.    After your blood sugar has normalized, think about why you went low. If you notice a pattern of low blood sugars, contact your Diabetes Team. We may need to adjust your medication.

## 2020-07-07 NOTE — TELEPHONE ENCOUNTER
Patient on [facility]'s post procedure call back system tracker  Patient had office visit today  No contact required

## 2020-07-07 NOTE — LETTER
July 7, 2020      Jeannine Arriaga MD  [address]  [facility] [address]           [facility] - Endocrinology 6th [place]  [address]  [facility] [address]  Phone: [phone]  Fax: [phone]          Patient: Henrietta Villasenor   MR Number: [id number]   YOB: 1940   Date of Visit: 7/7/2020       Dear Dr. Jeannine Arriaga:    Thank you for referring Henrietta Villasenor to me for evaluation. Attached you will find relevant portions of my assessment and plan of care.    If you have questions, please do not hesitate to call me. I look forward to following Henrietta Villasenor along with you.    Sincerely,    Niya Flores NP    Enclosure  CC:  No Recipients    If you would like to receive this communication electronically, please contact [email or web address] or [phone] to request more information on [facility] [employer] access.    For providers and/or their staff who would like to refer a patient to [facility], please contact us through our one-stop-shop provider referral line, [facility] [place], at [phone].    If you feel you have received this communication in error or would no longer like to receive these types of communications, please e-mail [email or web address]

## 2020-07-07 NOTE — ASSESSMENT & PLAN NOTE
-- A1c goal 7.5-8.0%.  -- Medications discussed:  MFM - discontinued due to CKD in the past  SGLT2 - refused  Insulin   -- Reviewed logs/CGM:  Fasting hyperglycemia.    Not checking glucose during the day.  Suspicious of global hyperglycemia given A1c.  Discussed the only way to make adjustments safely is for her to check and document glucose.  Instructed to check glucose before meals and before bed.    Instructed to send glucose logs in 14 days.  Reach out to me sooner for any glucose <70 or consistently >250.  -- Medication Changes:   Insufficient data to make medication changes today - close follow up  CONTINUE:  Lantus 60units nightly  Novolog 23units TIDAC   -- Reviewed goals of therapy are to get the best control we can without hypoglycemia.  -- Reviewed patient's current insulin regimen. Clarified proper insulin dose and timing in relation to meals, etc. Insulin injection sites and proper rotation instructed.    -- Advised frequent self blood glucose monitoring.  Patient encouraged to document glucose results and bring them to every clinic visit.  -- Hypoglycemia precautions discussed. Instructed on precautions before driving.    -- Call for Bg repeatedly < 90 or > 180.   -- Close adherence to lifestyle changes recommended.   -- Periodic follow ups for eye evaluations, foot care and dental care suggested.

## 2020-07-08 ENCOUNTER — TELEPHONE (OUTPATIENT)
Dept: PAIN MEDICINE | Facility: CLINIC | Age: 80
End: 2020-07-08

## 2020-07-08 RX ORDER — OXYCODONE AND ACETAMINOPHEN 10; 325 MG/1; MG/1
TABLET ORAL
Qty: 150 TABLET | Refills: 0 | Status: SHIPPED | OUTPATIENT
Start: 2020-07-08 | End: 2020-08-10 | Stop reason: SDUPTHER

## 2020-07-08 NOTE — TELEPHONE ENCOUNTER
----- Message from Astrid Williamson sent at 7/8/2020  3:40 PM CDT -----  Contact: Pt-- [phone]  Type:  RX Refill Request    Who Called:  Pt    Refill or New Rx: refill    RX Name and Strength: oxyCODONE-acetaminophen (PERCOCET)  mg per tablet    Preferred Pharmacy with phone number: [facility] [phone] (Phone)  [phone] (Fax)    Would the patient rather a call back or a response via [facility]? Call    Best Call Back Number: [phone]

## 2020-07-08 NOTE — TELEPHONE ENCOUNTER
Staff called to confirm 7/9/20 11:40 am office visit with Clover Kirby Np.     -Please arrive 15 minutes prior to appointment time  -Please call [phone] upon arriving to building   -No Non essential visitors due to limited seating in waiting area  -Essential visitors will be seated in chairs outside of clinic until patient exam is complete.   -Do you require assistance?   -Stop at temp station/ get mask if you do not have one (Ok to wear your own)    Patient did not answer therefore staff left a detailed voice message informing the patient of the above information.

## 2020-07-09 ENCOUNTER — OFFICE VISIT (OUTPATIENT)
Dept: PAIN MEDICINE | Facility: CLINIC | Age: 80
End: 2020-07-09
Payer: MEDICARE

## 2020-07-09 ENCOUNTER — HOSPITAL ENCOUNTER (OUTPATIENT)
Dept: RADIOLOGY | Facility: OTHER | Age: 80
Discharge: HOME | End: 2020-07-09
Attending: NURSE PRACTITIONER
Payer: MEDICARE

## 2020-07-09 VITALS
SYSTOLIC BLOOD PRESSURE: 122 MMHG | TEMPERATURE: 98 F | OXYGEN SATURATION: 100 % | WEIGHT: 220.44 LBS | HEART RATE: 82 BPM | BODY MASS INDEX: 33.41 KG/M2 | DIASTOLIC BLOOD PRESSURE: 74 MMHG | HEIGHT: 68 IN | RESPIRATION RATE: 18 BRPM

## 2020-07-09 DIAGNOSIS — G89.4 CHRONIC PAIN DISORDER: ICD-10-CM

## 2020-07-09 DIAGNOSIS — M47.816 LUMBAR SPONDYLOSIS: ICD-10-CM

## 2020-07-09 DIAGNOSIS — M79.18 MYOFASCIAL PAIN: ICD-10-CM

## 2020-07-09 DIAGNOSIS — E21.3 HYPERPARATHYROIDISM: ICD-10-CM

## 2020-07-09 DIAGNOSIS — M50.30 DDD (DEGENERATIVE DISC DISEASE), CERVICAL: ICD-10-CM

## 2020-07-09 DIAGNOSIS — R13.10 DYSPHAGIA, UNSPECIFIED TYPE: ICD-10-CM

## 2020-07-09 DIAGNOSIS — M51.36 DDD (DEGENERATIVE DISC DISEASE), LUMBAR: ICD-10-CM

## 2020-07-09 DIAGNOSIS — M47.812 CERVICAL SPONDYLOSIS: ICD-10-CM

## 2020-07-09 DIAGNOSIS — M54.16 LUMBAR RADICULOPATHY: Primary | ICD-10-CM

## 2020-07-09 DIAGNOSIS — M85.80 OSTEOPENIA, UNSPECIFIED LOCATION: ICD-10-CM

## 2020-07-09 DIAGNOSIS — M48.062 SPINAL STENOSIS OF LUMBAR REGION WITH NEUROGENIC CLAUDICATION: ICD-10-CM

## 2020-07-09 PROCEDURE — 1101F PT FALLS ASSESS-DOCD LE1/YR: CPT | Mod: HCNC,CPTII,S$GLB, | Performed by: NURSE PRACTITIONER

## 2020-07-09 PROCEDURE — 99213 OFFICE O/P EST LOW 20 MIN: CPT | Mod: HCNC,S$GLB,, | Performed by: NURSE PRACTITIONER

## 2020-07-09 PROCEDURE — 3078F PR MOST RECENT DIASTOLIC BLOOD PRESSURE < 80 MM HG: ICD-10-PCS | Mod: HCNC,CPTII,S$GLB, | Performed by: NURSE PRACTITIONER

## 2020-07-09 PROCEDURE — 1159F MED LIST DOCD IN RCRD: CPT | Mod: HCNC,S$GLB,, | Performed by: NURSE PRACTITIONER

## 2020-07-09 PROCEDURE — 1125F PR PAIN SEVERITY QUANTIFIED, PAIN PRESENT: ICD-10-PCS | Mod: HCNC,S$GLB,, | Performed by: NURSE PRACTITIONER

## 2020-07-09 PROCEDURE — 99999 PR PBB SHADOW E&M-EST. PATIENT-LVL IV: ICD-10-PCS | Mod: PBBFAC,HCNC,, | Performed by: NURSE PRACTITIONER

## 2020-07-09 PROCEDURE — 1101F PR PT FALLS ASSESS DOC 0-1 FALLS W/OUT INJ PAST YR: ICD-10-PCS | Mod: HCNC,CPTII,S$GLB, | Performed by: NURSE PRACTITIONER

## 2020-07-09 PROCEDURE — 77080 DXA BONE DENSITY AXIAL: CPT | Mod: 26,HCNC,, | Performed by: RADIOLOGY

## 2020-07-09 PROCEDURE — 76536 US EXAM OF HEAD AND NECK: CPT | Mod: 26,HCNC,, | Performed by: RADIOLOGY

## 2020-07-09 PROCEDURE — 3074F SYST BP LT 130 MM HG: CPT | Mod: HCNC,CPTII,S$GLB, | Performed by: NURSE PRACTITIONER

## 2020-07-09 PROCEDURE — 77080 DXA BONE DENSITY AXIAL: CPT | Mod: TC,HCNC

## 2020-07-09 PROCEDURE — 77080 DEXA BONE DENSITY SPINE HIP: ICD-10-PCS | Mod: 26,HCNC,, | Performed by: RADIOLOGY

## 2020-07-09 PROCEDURE — 99213 PR OFFICE/OUTPT VISIT, EST, LEVL III, 20-29 MIN: ICD-10-PCS | Mod: HCNC,S$GLB,, | Performed by: NURSE PRACTITIONER

## 2020-07-09 PROCEDURE — 3078F DIAST BP <80 MM HG: CPT | Mod: HCNC,CPTII,S$GLB, | Performed by: NURSE PRACTITIONER

## 2020-07-09 PROCEDURE — 3074F PR MOST RECENT SYSTOLIC BLOOD PRESSURE < 130 MM HG: ICD-10-PCS | Mod: HCNC,CPTII,S$GLB, | Performed by: NURSE PRACTITIONER

## 2020-07-09 PROCEDURE — 76536 US EXAM OF HEAD AND NECK: CPT | Mod: TC,HCNC

## 2020-07-09 PROCEDURE — 99999 PR PBB SHADOW E&M-EST. PATIENT-LVL IV: CPT | Mod: PBBFAC,HCNC,, | Performed by: NURSE PRACTITIONER

## 2020-07-09 PROCEDURE — 76536 US SOFT TISSUE HEAD NECK THYROID: ICD-10-PCS | Mod: 26,HCNC,, | Performed by: RADIOLOGY

## 2020-07-09 PROCEDURE — 1125F AMNT PAIN NOTED PAIN PRSNT: CPT | Mod: HCNC,S$GLB,, | Performed by: NURSE PRACTITIONER

## 2020-07-09 PROCEDURE — 1159F PR MEDICATION LIST DOCUMENTED IN MEDICAL RECORD: ICD-10-PCS | Mod: HCNC,S$GLB,, | Performed by: NURSE PRACTITIONER

## 2020-07-09 NOTE — PATIENT INSTRUCTIONS
Causes of Lumbar (Low Back) Pain  Low back pain can be caused by problems with any part of the lumbar spine. A disk can herniate (push out) and press on a nerve. Vertebrae can rub against each other or slip out of place. This can irritate facet joints and nerves. It can also lead to stenosis, a narrowing of the spinal canal or foramen.  Pressure from a disk  Constant wear and tear on a disk can cause it to weaken and push outward. Part of the disk may then press on nearby nerves. There are two common types of herniated disks:  Contained means the soft nucleus is protruding outward.   Extruded means the firm annulus has torn, letting the soft center squeeze through.     Pressure from bone  An unstable spine   With age, a disk may thin and wear out. Vertebrae above and below the disk may begin to touch. This can put pressure on nerves. It can also cause bone spurs (growths) to form where the bones rub together.    Stenosis results when bone spurs narrow the foramen or spinal canal. This also puts pressure on nerves. Slipping vertebrae can irritate nerves and joints. They can also worsen stenosis.    In some cases, vertebrae become unstable and slip forward. This is called spondylolisthesis.     Date Last Reviewed: 10/12/2015  © 4769-1744 [employer]. [address] [place] [address], [place] [address]. All rights reserved. This information is not intended as a substitute for professional medical care. Always follow your healthcare professional's instructions.        Back Exercises: Back Release  Do this exercise on your hands and knees. Keep your knees under your hips and your hands under your shoulders.      · Relax your abdominal and buttocks muscles, lift your head, and let your back sag. Be sure to keep your weight evenly distributed. Dont sit back on your hips.   · Hold for 5 seconds.  · Return to starting position.  · Tuck your head and lift (arch) your back.  · Hold for 5 seconds  · Return to starting  position.  · Repeat 5 times.  Date Last Reviewed: 8/16/2015  © 4100-4256 [employer]. [address] [place] [address]. All rights reserved. This information is not intended as a substitute for professional medical care. Always follow your healthcare professional's instructions.        Back Exercises: Leg Pull    To start, lie on your back with your knees bent and feet flat on the floor. Dont press your neck or lower back to the floor. Breathe deeply. You should feel comfortable and relaxed in this position.  · Pull one knee to your chest.  · Hold for 30 to 60 seconds. Return to starting position.  · Repeat 2 times.  · Switch legs.  · For a double leg pull, pull both legs to your chest at the same time. Repeat 2 times.  For your safety, check with your healthcare provider before starting an exercise program.   Date Last Reviewed: 8/16/2015 © 2000-2017 [employer]. [address] [place] [address]. All rights reserved. This information is not intended as a substitute for professional medical care. Always follow your healthcare professional's instructions.        Back Exercises: Lower Back Rotation    To start, lie on your back with your knees bent and feet flat on the floor. Dont press your neck or lower back to the floor. Breathe deeply. You should feel comfortable and relaxed in this position.  · Drop both knees to one side. Turn your head to the other side. Keep your shoulders flat on the floor.  · Do not push through pain.  · Hold for 20 seconds.  · Slowly switch sides.  · Repeat 2 to 5 times.  Date Last Reviewed: 10/11/2015  © 0788-4608 [employer]. [address] [place] [address]. All rights reserved. This information is not intended as a substitute for professional medical care. Always follow your healthcare professional's instructions.        Back Exercises: Lower Back Stretch    To start, sit in a chair with your feet flat on the floor.  Shift your weight slightly forward. Relax, and keep your ears, shoulders, and hips aligned.  · Sit with your feet well apart.  · Bend forward and touch the floor with the backs of your hands. Relax and let your body drop.  · Hold for 20 seconds. Return to starting position.  · Repeat 2 times.   Date Last Reviewed: 8/16/2015  © 7210-8132 [employer]. [address] [place] [address], [place] [address]. All rights reserved. This information is not intended as a substitute for professional medical care. Always follow your healthcare professional's instructions.

## 2020-07-09 NOTE — PROGRESS NOTES
Chronic Pain - Established Visit    Referring Physician: No ref. provider found    Chief Complaint:   Chief Complaint   Patient presents with    Low-back Pain    Neck Pain        SUBJECTIVE:    Interval History 7/9/2020:  The patient returns to clinic today for follow up of neck pain. She is s/p bilateral C4,5,6 MBB on 6/24/2020. She reports 90% relief of her neck pain. She continues to report benefit. She reports intermittent neck pain that is tolerable at this time. She denies any radiating arm pain. She does report increased low back pain that intermittently radiates down the posterolateral aspect of her legs to her ankles bilaterally. This pain is worse with activity. She denies any other health changes. Her pain today is 8/10.    Interval History 6/15/2020:  The patient presents for follow up of severe Left>R sided neck pain with radiation into shoulder and states pain does not go below shoulders. Pt was scheduled for B C4,5,6 MBB but secondary to covid she had to reschedule. The patient denies myelopathic symptoms such as handwriting changes or difficulty with buttons/coins/keys. Denies perineal paresthesias, bowel/bladder dysfunction. She states her CBGs are in low 100s at this time. She is taking Robaxin PRN.     Interval History 4/3/2020:  Follow up is for neck pain. She was scheduled for C4, C5, C6 MBB on 3/20/20 but this had to be canceled for now due to the covid epidemic. At last visit her tizanidine prescription was changed to robaxan. She says her pain is tolerable with robaxan and Biofreeze which she purchased over-the-counter.  Denies any numbness, weakness, bowel/bladder changes.     Interval History 3/4/2020:  The patient returns to clinic today for follow up of neck pain. She reports that trigger point injections at last visit provided significant relief until the last week. She reports neck pain that is sharp and aching in nature. She denies any radiating arm pain. Her pain is worse with  turning her head to the side. She reports difficulty doing housework due to pain. She is currently taking Zanaflex with limited relief. She does take Percocet per her PCP with benefit. She denies any other health changes. Her pain today is 9/10.        Interval History 1/30/2020:  The patient is here to discuss new onset neck pain.  It is mainly located over the left neck and shoulder area.  She denies radiation past the shoulder.  She denies numbness and tingling.  She denies weakness.  She had a recent ED visit for dizziness.  She had CT scans to rule out clot.  She would like an injection today.  She denies bowel or bladder changes.  Her pain today is 10/10.    Interval History 12/3/2019:  The patient is here for follow up of back pain.  Her pain is radiating down the sides of both legs with numbness.  Her BP remains elevated today.  She is followed by her PCP for this.  She denies SOB or chest pain today.  She thinks that it is always high when she comes to [employer] due to walking far from the garage.  She did have benefit with previous bilateral L4-5 TF JANEE.  Her pain today is 4/10.  The patient denies any bowel or bladder incontinence or signs of saddle paresthesia.  The patient denies any major medical changes since last office visit.    Interval History 10/1/2019:  The patient is here for follow up today of back and leg pain.  She is s/p bilateral L4-5 TF JANEE with 50% relief.  She says that her pain is more tolerable.  She has more pain with walking, such as walking to our office today.  She has persistent burning to both feet.  She has a known history of diabetes.  Her pain is worse at night and she describes it as burning.  She has intermittent radiation down the sides of both legs.  Her back pain is greater than her leg pain.  She takes Percocet from her PCP with benefit.  She would like to repeat Healthy Back.  She feels as though this was helpful and would like to restart.  Her pain today is 7/10.  Her  blood pressure is elevated today.  She says that she has not taken her medication today because she was rushing to get to her appointment.  She denies headache, SOB, or chest pain.     Initial Encounter:  Henrietta Villasenor presents to the clinic for the evaluation of low back pain. The pain started and symptoms have been worsening.The pain is located in the low back area and radiates to the bilateral leg swelling.  The pain is described as sharp, shooting, throbbing, tingling and constant and is rated as 5/10. The pain is rated with a score of  5/10 on the BEST day and a score of 8/10 on the WORST day.  Symptoms interfere with daily activity and sleeping. The pain is exacerbated by Standing, Bending, Coughing/Sneezing, Walking, Morning, Lifting and Getting out of bed/chair.  The pain is mitigated by medications. She reports spending 0 hours per day reclining. The patient reports 2 hours of uninterrupted sleep per night.    Patient denies night fever/night sweats, urinary incontinence, bowel incontinence, significant weight loss, significant motor weakness and loss of sensations.    Physical Therapy/Home Exercise: yes      Pain Disability Index Review:  Last 3 PDI Scores 7/9/2020 6/15/2020 3/4/2020   Pain Disability Index (PDI) 60 54 57       Pain Medications:    - Opioids: Percocet (Oxycodone/Acetaminophen)  - Adjuvant Medications: Cymbalta ( Duloxetine) and Tizanidine  - Anti-Coagulants: None  - Others: None     report:  Reviewed and consistent with medication use as prescribed.    Pain Procedures:   9/17/19 Bilateral L4-5 TF JANEE- 50% relief  6/24/2020- Bilateral C4,5,6 MBB-90% relief    Imaging:  CT Cervical Spine 1/2/2020:    FINDINGS:  No evidence of acute cervical spine fracture or dislocation.  Odontoid process is intact.  Craniocervical junction is unremarkable.  Cervical spine alignment is within normal limits.  Prominent multilevel degenerative changes are seen with DISH.    Surrounding soft tissues show no  significant abnormalities.  Airway is patent.      Impression       No evidence of acute cervical spine fracture or dislocation.         6/12/18 MRI Lumbar  Narrative     EXAMINATION:  MRI LUMBAR SPINE WITHOUT CONTRAST    CLINICAL HISTORY:  low back and right leg pain; Other intervertebral disc degeneration, lumbar region    TECHNIQUE:  Multiplanar, multisequence MR images were acquired from the thoracolumbar junction to the sacrum without the administration of contrast.    COMPARISON:  Radiograph 06/05/2018    FINDINGS:  Alignment: Normal.    Vertebrae: Normal marrow signal. No fracture.    Discs: Normal height and signal.    Cord: Normal.  Conus terminates at superior border of L1    Degenerative findings:    T12-L1: Mild broad-based disc bulge without compressive sequelae.    L1-L2: Unremarkable.    L2-L3: Mild broad-based disc bulge with facet arthropathy.  No compressive sequelae.    L3-L4: Broad-based disc bulge and facet arthropathy.  There is mild bilateral neural foraminal narrowing.    L4-L5: Trace anterolisthesis of L4 on L5 with broad-based disc bulge and facet arthropathy.  This results in moderate bilateral neural foraminal narrowing.  Moderate canal stenosis.  There is also significant narrowing of the left greater than right lateral recesses.    L5-S1: Broad-based disc bulge without significant compressive sequelae.    Paraspinal muscles & soft tissues: Paraspinal atrophy.  No concerning findings in the visualized abdomen.  Add      Impression       Moderate degenerative change at L4-5.  Milder degenerative changes elsewhere in the lumbar spine.     6/5/18 Xray Lumbar  Narrative     EXAMINATION:  XR LUMBAR SPINE 5 VIEW WITH FLEX AND EXT    CLINICAL HISTORY:  low back and right leg pain;  Other intervertebral disc degeneration, lumbar region    TECHNIQUE:  Five views of the lumbar spine plus flexion extension views were performed.    COMPARISON:  02/03/2015    FINDINGS:  The lumbar vertebra are  intact.  No compression fracture is identified.  Degenerative changes are again seen with small osteophytes at multiple levels.  L4-5 disc space is mildly narrowed.  Degenerative changes are also noted at lower facet joints.  There is associated grade 1 spondylolisthesis at L4-5 which is stable between flexion and extension at about 6 mm.  Alignment is otherwise satisfactory.    Visualized abdomen shows aortic atherosclerosis and surgical clips in the right upper quadrant.      Impression       No acute abnormality.  Chronic degenerative spine changes.           Past Medical History:   Diagnosis Date    Abnormality of lung 11/08/2011    Stable bandlike opacities at the lung bases, most likely representing      Anxiety     Arthritis     CAD (coronary artery disease)     Calculus of ureter 2/22/2011    CHF (congestive heart failure)     Chronic back pain 7/29/2012    Colon polyp 9/2010; 11/2010    Colon polyps 7/29/2012    Coronary artery disease involving native coronary artery of native heart with angina pectoris     Depression     Diabetes mellitus     Diabetes mellitus type II     Diverticulitis large intestine 7/27/2015    Diverticulitis of large intestine without perforation or abscess without bleeding     Diverticulosis 09/25/2010; 11/02/2010; 11/08/2011; 7/29/2012    Duodenal disorder 08/25/2011    Duodenal erosion noted on EGD.    Duodenal ulcer, unspecified as acute or chronic, without hemorrhage, perforation, or obstruction 8/24/2011    E. coli sepsis 12/2010    Due to left ureteral stone with left nephrostomy tube - hospitalized in [place]    Esophageal dysmotility 01/24/2012    Noted on upper GI-barium swallow.    Facial weakness 1969    Left facial weakness s/p left mastoidectomy in ~ 1969.    Fatty liver 11/08/2011    Reported on CT-abdomen and in 06/2012 Gastro clinic visit note.    Gastric polyp 09/29/2010    GERD (gastroesophageal reflux disease) 7/29/2012    Hepatomegaly  11/08/2011    Reported on CT-abdomen    Herpes zoster with other nervous system complications(053.19) 2/28/2011    Hiatal hernia 06/26/2006; 09/29/2010; 08/25/2011    Noted on barium swallow 2006; noted on  EGD 2011.   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    Trouble in sleeping     Type II or unspecified type diabetes mellitus with neurological manifestations, not stated as uncontrolled(250.60)     Type II or unspecified type diabetes mellitus with peripheral circulatory disorders, not stated as uncontrolled(250.70)     Type II or unspecified type diabetes mellitus with renal manifestations, not stated as uncontrolled(250.40)      Past Surgical History:   Procedure Laterality Date    BELPHAROPTOSIS REPAIR      s/p LAMBERTO levator repair - Dr. Dejesus    CARDIAC CATHETERIZATION      CARPAL TUNNEL RELEASE  3/13/2012    CATARACT EXTRACTION W/  INTRAOCULAR LENS IMPLANT Right 10/31/2018        CATARACT EXTRACTION W/  INTRAOCULAR LENS IMPLANT Left 03/22/2018        CHOLECYSTECTOMY      COLONOSCOPY N/A 11/16/2016    Procedure: COLONOSCOPY;  Surgeon: Chris Storey MD;  Location: [address]);  Service: Endoscopy;  Laterality: N/A;    COLONOSCOPY N/A 6/14/2017    Procedure:  COLONOSCOPY;  Surgeon: Chris Storey MD;  Location: [facility] ENDO (4TH FLR);  Service: Endoscopy;  Laterality: N/A;  colonoscopy in 3 months with better bowel prep. - Split PEG prep ordered    EXTRACORPOREAL SHOCK WAVE LITHOTRIPSY      INJECTION OF ANESTHETIC AGENT AROUND NERVE Bilateral 6/24/2020    Procedure: BLOCK, NERVE, C4-C5-C6 MEDIAL BRANCH ok per [place] to add on;  Surgeon: Oscar Ravi MD;  Location: [facility], [place], operated by [facility];  Service: Pain Management;  Laterality: Bilateral;    INTRAOCULAR PROSTHESES INSERTION Right 10/31/2018    Procedure: INSERTION-INTRAOCULAR LENS (IOL);  Surgeon: Keysha Valle MD;  Location: [facility], [place], operated by [facility] OR;  Service: Ophthalmology;  Laterality: Right;    JOINT REPLACEMENT      mastoid tumor removal  1969    Left mastoidectomy with residual left facial weakness.    NEPHROSTOMY      OOPHORECTOMY      PHACOEMULSIFICATION OF CATARACT Right 10/31/2018    Procedure: PHACOEMULSIFICATION-ASPIRATION-CATARACT;  Surgeon: Keysha Valle MD;  Location: [facility];  Service: Ophthalmology;  Laterality: Right;    TOTAL ABDOMINAL HYSTERECTOMY  1969    TAHBSO    TOTAL KNEE ARTHROPLASTY  9/13/2012    Left    TRANSFORAMINAL EPIDURAL INJECTION OF STEROID Bilateral 9/17/2019    Procedure: INJECTION, STEROID, EPIDURAL, TRANSFORAMINAL APPROACH;  Surgeon: Oscar Ravi MD;  Location: [facility], [place], operated by [facility];  Service: Pain Management;  Laterality: Bilateral;  B/L TFESI L4/L5     Social History     Socioeconomic History    Marital status: Single     Spouse name: Not on file    Number of children: 5    Years of education: Not on file    Highest education level: Not on file   Occupational History    Not on file   Social Needs    Financial resource strain: Somewhat hard    Food insecurity     Worry: Sometimes true     Inability: Often true    Transportation needs     Medical: No     Non-medical: No   Tobacco Use    Smoking status: Former Smoker     Packs/day: 1.00     Years: 15.00     Pack years: 15.00     Types:  Cigarettes     Quit date: 2000     Years since quittin.9    Smokeless tobacco: Never Used   Substance and Sexual Activity    Alcohol use: No    Drug use: No    Sexual activity: Never   Lifestyle    Physical activity     Days per week: 0 days     Minutes per session: 0 min    Stress: Very much   Relationships    Social connections     Talks on phone: More than three times a week     Gets together: Once a week     Attends [employer] service: More than 4 times per year     Active member of club or organization: Yes     Attends meetings of clubs or organizations: 1 to 4 times per year     Relationship status: Never    Other Topics Concern    Are you pregnant or think you may be? Not Asked    Breast-feeding Not Asked   Social History Narrative    Single with 5 children. Lives alone. Retired cook.     Family History   Problem Relation Age of Onset    Sleep apnea Sister     Diabetes Sister     Cancer Mother         brain tumor    Hypertension Mother     Heart disease Father     Heart attack Father     Dementia Brother     Lupus Brother     Frontotemporal dementia Brother     No Known Problems Daughter     No Known Problems Son     Diabetes Sister     Lupus Sister     Breast cancer Sister     Diabetes Sister     Cancer Sister         breast cancer    Heart attack Sister     Diabetes Sister     Liver cancer Brother     Drug abuse Brother     Alcohol abuse Brother     Cirrhosis Brother     Drug abuse Brother     No Known Problems Daughter     No Known Problems Son     No Known Problems Son     No Known Problems Maternal Grandmother     No Known Problems Maternal Grandfather     No Known Problems Paternal Grandmother     No Known Problems Paternal Grandfather     Diabetes Brother     Dementia Brother     Diabetes Other     Ovarian cancer Other         Niece     Breast cancer Maternal Aunt     No Known Problems Maternal Uncle     No Known Problems Paternal Aunt      "No Known Problems Paternal Uncle     Glaucoma Neg Hx     Blindness Neg Hx     Celiac disease Neg Hx     Colon cancer Neg Hx     Colon polyps Neg Hx     Esophageal cancer Neg Hx     Inflammatory bowel disease Neg Hx     Liver disease Neg Hx     Rectal cancer Neg Hx     Stomach cancer Neg Hx     Ulcerative colitis Neg Hx     Melanoma Neg Hx     Multiple sclerosis Neg Hx     Psoriasis Neg Hx     Amblyopia Neg Hx     Cataracts Neg Hx     Macular degeneration Neg Hx     Retinal detachment Neg Hx     Strabismus Neg Hx     Stroke Neg Hx     Thyroid disease Neg Hx        Review of patient's allergies indicates:   Allergen Reactions    Crestor [rosuvastatin]      Cramping in legs    Ezetimibe Diarrhea     Other reaction(s): abdominal pain, Diarrhea      Hydrocodone Itching    Lisinopril      Other reaction(s): cough      Sulfa (sulfonamide antibiotics) Itching and Rash           Sulfamethoxazole     Sulfamethoxazole-trimethoprim     Valsartan Swelling       Current Outpatient Medications   Medication Sig    ACCU-CHEK SMARTVIEW TEST STRIP Strp TEST THREE TIMES DAILY    albuterol (VENTOLIN HFA) 90 mcg/actuation inhaler Inhale 2 puffs by mouth into the lungs every 4 (four) hours as needed for Wheezing. Rescue    allopurinol (ZYLOPRIM) 100 MG tablet Take 1 tablet (100 mg total) by mouth once daily.    amLODIPine (NORVASC) 5 MG tablet Take 1 tablet (5 mg total) by mouth once daily.    aspirin (ECOTRIN) 81 MG EC tablet Take 1 tablet by mouth Daily.    atorvastatin (LIPITOR) 40 MG tablet Take 1 tablet (40 mg total) by mouth once daily.    BD ALCOHOL SWABS PadM USE  WHEN  TESTING BLOOD SUGAR FOUR TIMES DAILY    BD ULTRA-FINE OLU PEN NEEDLES 32 x 5/32 " Ndle Uses 4 times daily, on multiple daily insulin injections    blood sugar diagnostic Strp One Touch verio test strips - check blood sugar three times daily    blood-glucose meter kit Use as instructed    carbamide peroxide (EAR WAX REMOVAL " "DROPS OTIC)     carvediloL (COREG) 25 MG tablet Take 1 tablet (25 mg total) by mouth 2 (two) times daily.    cholecalciferol, vitamin D3, (DIALYVITE VITAMIN D) 5,000 unit capsule Take 5,000 Units by mouth once daily.    DULoxetine (CYMBALTA) 60 MG capsule Take 1 capsule (60 mg total) by mouth 2 (two) times daily.    fluticasone (FLONASE) 50 mcg/actuation nasal spray SHAKE LQ AND U 2 SPRAYS IEN QD    furosemide (LASIX) 40 MG tablet TAKE 1 TABLET BY MOUTH TWICE DAILY    insulin (LANTUS SOLOSTAR U-100 INSULIN) glargine 100 units/mL (3mL) SubQ pen INJECT  55-60 UNITS SUBCUTANEOUSLY  AT  NIGHT    insulin aspart (NOVOLOG) 100 unit/mL InPn pen 23 units with meals plus correction scale. Max daily dose=120 units (Patient taking differently: Inject 23 Units into the skin 3 (three) times daily with meals. 23 units with meals plus correction scale. Max daily dose=120 units)    lancets (ACCU-CHEK FASTCLIX LANCING DEV) Misc Lancets and device = check blood sugar 4 times daily    loratadine (CLARITIN) 10 mg tablet Take 1 tablet (10 mg total) by mouth once daily.    meclizine (ANTIVERT) 12.5 mg tablet Take 1 tablet (12.5 mg total) by mouth 3 (three) times daily as needed for Dizziness.    oxyCODONE-acetaminophen (PERCOCET)  mg per tablet Take one tablet by mouth 5 times daily    pantoprazole (PROTONIX) 40 MG tablet TAKE 1 TABLET(40 MG) BY MOUTH twice daily    pen needle, diabetic (SURE-FINE PEN NEEDLES) 29 gauge x 1/2" Ndle Use 4 times daily    rOPINIRole (REQUIP) 0.5 MG tablet TAKE 1 TABLET (0.5 MG TOTAL) BY MOUTH EVERY EVENING.    TRUEPLUS LANCETS 33 gauge Misc TEST BLOOD SUGAR FOUR TIMES DAILY    azelastine (ASTELIN) 137 mcg (0.1 %) nasal spray Spray 1 spray (137 mcg total) by Nasal route 2 (two) times daily.    olopatadine (PATADAY) 0.2 % Drop Place 1 drop into both eyes once daily.    triamcinolone acetonide 0.1% (KENALOG) 0.1 % cream Apply topically 3 (three) times daily.     No current " "facility-administered medications for this visit.      Facility-Administered Medications Ordered in Other Visits   Medication    tetracaine HCl (PF) 0.5 % Drop 1 drop       REVIEW OF SYSTEMS:    GENERAL:  No weight loss, malaise or fevers.  HEENT:  Negative for frequent or significant headaches.  NECK:  Negative for lumps, goiter, pain and significant neck swelling.  RESPIRATORY:  Negative for cough, wheezing or shortness of breath. MARLON.  CARDIOVASCULAR:  Negative for chest pain, leg swelling or palpitations.  CAD and CHF.  GI:  Negative for abdominal discomfort, blood in stools or black stools or change in bowel habits. GERD.  MUSCULOSKELETAL:  Lower back and joint pain  SKIN:  Negative for lesions, rash, and itching.  PSYCH:  Negative for sleep disturbance, mood disorder and recent psychosocial stressors.  HEMATOLOGY/LYMPHOLOGY:  Negative for prolonged bleeding, bruising easily or swollen nodes. Anemia.  NEURO:   No history of headaches, syncope, paralysis, seizures or tremors.  All other reviewed and negative other than HPI.    OBJECTIVE:    /74   Pulse 82   Temp 98.2 °F (36.8 °C)   Resp 18   Ht 5' 8" (1.727 m)   Wt 100 kg (220 lb 7.4 oz)   LMP  (LMP Unknown)   SpO2 100%   BMI 33.52 kg/m²     PHYSICAL EXAMINATION:    General appearance: Well appearing, in no acute distress, alert and oriented x3.  Psych:  Mood and affect appropriate.  Skin: Skin color, texture, turgor normal, no rashes or lesions, in both upper and lower body.  Head/face:  Normocephalic, atraumatic. No palpable lymph nodes.  Neck: No pain to palpation over cervical paraspinals bilaterally. Spurling Negative bilaterally. Limited ROM with mild pain on extension.  Cor: RRR  Pulm: CTA  GI:  Soft and non-tender.  Back: Straight leg raise in the sitting position is negative for radicular pain bilaterally. Limited ROM with pain on flexion and extension. Positive facet loading bilaterally.   Extremities: Peripheral joint ROM is full and " pain free without obvious instability or laxity in all four extremities. No deformities, edema, or skin discoloration. Good capillary refill.  Musculoskeletal: Bilateral upper and lower extremity strength is normal and symmetric.  No atrophy or tone abnormalities are noted.  Neuro: Bilateral upper and lower extremity coordination and muscle stretch reflexes are physiologic and symmetric.  Plantar response are downgoing. + decreased sensation to C8 distribution to the right. (-) Nichols   Gait: Antalgic- aided with cane.    ASSESSMENT: 80 y.o. year old female with chronic lower back pain and leg pain consistent with the following diagnoses:    1. Lumbar radiculopathy     2. Lumbar spondylosis     3. Spinal stenosis of lumbar region with neurogenic claudication     4. DDD (degenerative disc disease), lumbar     5. Cervical spondylosis     6. DDD (degenerative disc disease), cervical     7. Myofascial pain     8. Chronic pain disorder           PLAN:     - Previous imaging was reviewed and discussed with the patient today.    - She is s/p bilateral C4,5,6 MBB with benefit. If short term relief, consider RFA.     - Schedule for bilateral L4/5 TF JANEE.     - I have stressed the importance of physical activity and a home exercise plan to help with pain and improve health. Lumbar exercises provided today.     - Continue Robaxin 500 mg TID.     - RTC in 2 weeks after above procedure.     - Counseled patient regarding the importance of physical therapy.    - Dr. Ravi was consulted on the patient and agrees with this plan.    The above plan and management options were discussed at length with patient. Patient is in agreement with the above and verbalized understanding.     Clover Kirby  07/09/2020

## 2020-07-16 PROBLEM — E04.1 THYROID NODULE: Status: ACTIVE | Noted: 2020-07-16

## 2020-07-27 ENCOUNTER — TELEPHONE (OUTPATIENT)
Dept: ENDOCRINOLOGY | Facility: CLINIC | Age: 80
End: 2020-07-27

## 2020-07-27 NOTE — TELEPHONE ENCOUNTER
----- Message from Holly Almonte RN sent at 7/27/2020  3:20 PM CDT -----  Regarding: FW: at the wrong location    ----- Message -----  From: Camryn Ty  Sent: 7/27/2020   3:12 PM CDT  To: Mark N[facility]ez Staff  Subject: at the wrong location                            Pt needing a call regarding she was at the wrong location please assist pt if she is able to come in     Pt contact #[phone]

## 2020-07-27 NOTE — TELEPHONE ENCOUNTER
----- Message from Holly Almonte RN sent at 7/27/2020  3:20 PM CDT -----  Regarding: FW: at the wrong location    ----- Message -----  From: Camryn Ty  Sent: 7/27/2020   3:12 PM CDT  To: Mark Núñez Staff  Subject: at the wrong location                            Pt needing a call regarding she was at the wrong location please assist pt if she is able to come in     Pt contact #[phone]

## 2020-07-27 NOTE — PROGRESS NOTES
Subjective:      Patient ID: Henrietta Villasenor is a 80 y.o. female.    Chief Complaint:  Diabetes    History of Present Illness  Henrietta Villasenor is here for follow up of T2DM, hyperpara, vit D def, osteopenia, thyroid nodule.  Previously seen by me 7/7/2020.    With regards to diabetes:    Diagnosed:  Late 1990s  Known complications:  DKA -  RN +  PN +  Nephropathy +  CAD +    Current regimen:  Lantus 60units nightly  Novolog 24units TIDAC     Other medications tried:  MFM - CKD    Glucose Monitor: Just got a new meter  3 times a day testing  Log reviewed:           Patient has diabetes mellitus and manages diabetes with an intensive insulin regimen. The patient requires a therapeutic CGM and is willing to use therapeutic CGM for the necesary frequent adjustments of insulin therapy. Patient has been using SMBG for frequent glucose monitoring (4+ times daily). I have completed an in-person visit during the previous 6 months and will continue to have in-person visits every 6 months to assess adherence to their CGM regimen and diabetes treatment plan.    Hypoglycemia:  None.  Knows how to correct with 15 grams of carbs- juice, coke, or a peppermint.     Diet/Exercise:  Eats 2-3 meals a day.   B: coffee, oatmeal, toast   L: banana, bread, diet coke  D: meat/ red beans, rice  Snacks : granola bar  Drinks : water and diet coke.   Exercise: None.       Education - last visit: 2018  Eye Exam: 1/2020  Podiatry: 6/2020    Denies history of pancreatitis & personal/family history of medullary thyroid cancer.     Diabetes Management Status    Hemoglobin A1C   Date Value Ref Range Status   06/19/2020 10.8 (H) 4.0 - 5.6 % Final     Comment:     ADA Screening Guidelines:  5.7-6.4%  Consistent with prediabetes  >or=6.5%  Consistent with diabetes  High levels of fetal hemoglobin interfere with the HbA1C  assay. Heterozygous hemoglobin variants (HbS, HgC, etc)do  not significantly interfere with this assay.   However, presence of  multiple variants may affect accuracy.     03/11/2020 10.5 (H) 4.0 - 5.6 % Final     Comment:     ADA Screening Guidelines:  5.7-6.4%  Consistent with prediabetes  >or=6.5%  Consistent with diabetes  High levels of fetal hemoglobin interfere with the HbA1C  assay. Heterozygous hemoglobin variants (HbS, HgC, etc)do  not significantly interfere with this assay.   However, presence of multiple variants may affect accuracy.     11/18/2019 9.0 (H) 4.0 - 5.6 % Final     Comment:     ADA Screening Guidelines:  5.7-6.4%  Consistent with prediabetes  >or=6.5%  Consistent with diabetes  High levels of fetal hemoglobin interfere with the HbA1C  assay. Heterozygous hemoglobin variants (HbS, HgC, etc)do  not significantly interfere with this assay.   However, presence of multiple variants may affect accuracy.         Statin: Taking  ACE/ARB: Not taking  Screening or Prevention Patient's value Goal Complete/Controlled?   HgA1C Testing and Control   Lab Results   Component Value Date    HGBA1C 10.8 (H) 06/19/2020      Annually/Less than 8% No   Lipid profile : 02/20/2020 Annually Yes   LDL control Lab Results   Component Value Date    LDLCALC 95.0 02/20/2020    Annually/Less than 100 mg/dl  Yes   Nephropathy screening Lab Results   Component Value Date    LABMICR 41.0 05/26/2017     Lab Results   Component Value Date    PROTEINUA Negative 01/02/2020    Annually Yes   Blood pressure BP Readings from Last 1 Encounters:   07/28/20 138/84    Less than 140/90 No   Dilated retinal exam : 01/27/2020 Annually Yes   Foot exam   : 05/16/2019 Annually No     With regards to the hyperparathyroidism:    Secondary to renal.     Lab Results   Component Value Date    .0 (H) 08/08/2017    CALCIUM 9.1 06/19/2020    PHOS 3.9 01/19/2018     Lab Results   Component Value Date    ALBUMIN 3.4 (L) 06/19/2020     With regards to Vitamin D Deficiency:    Vit D, 25-Hydroxy   Date Value Ref Range Status   06/19/2020 64 30 - 96 ng/mL Final     Comment:      Vitamin D deficiency.........<10 ng/mL                              Vitamin D insufficiency......10-29 ng/mL       Vitamin D sufficiency........> or equal to 30 ng/mL  Vitamin D toxicity............>100 ng/mL       Current Meds: OTC VIt D3 1000iu daily    With regards to osteopenia:    BMD:   7/9/2020  The L1 to L4 vertebral bone mineral density is equal to 1.215 g/cm squared with a T score of 0.2.  There has been a -3.2% statistically significant change relative to the prior study.  The left femoral neck bone mineral density is equal to 0.861 g/cm squared with a T score of -1.3.  The total hip bone mineral density is equal to 0.928 g/cm squared with a T score of -0.6.  There is a 5.4% risk of a major osteoporotic fracture and a 1.1% risk of hip fracture in the next 10 years (FRAX).  Impression:  Osteopenia    Fractures : denies    Calcium : None  Vitamin D : OTC Vit D3 1000iu daily     With regards to thyroid nodule:    Thyroid US:   7/9/2020  Right lobe:  Echogenicity: Homogeneous  Measures: 4 cm x 1.4 cm x 1.6 cm.  Lymph nodes: No lymph nodes identified at this time.  Left lobe:  Echogenicity: Homogeneous  Measures: 3.7 cm x 1.1 cm x 1.4 cm.  Lymph nodes: No lymph nodes are identified at this time.  Multiple bilateral thyroid nodules are present, with 3 index nodules as detailed below.  Nodule #1  Size:  cm  Location: Right lobe upper pole  Composition: solid/almost completely solid (2)  Echogenicity: very hypoechoic (3)  Shape: wider-than-tall (0)  Margins: smooth/ill-defined (0)  Echogenic foci: none (0)  Change: No  TI-RADS category: TR4 (5 points) - follow up is recommended at 1, 2, 3, and 5 years if 1 cm or greater; FNA is recommended if 1.5 cm or greater.  Nodule #2  Size: 1.5 cm x 7 mm x 1.2 cm  Location: Isthmus  Composition: solid/almost completely solid (2)  Echogenicity: very hypoechoic (3)  Shape: wider-than-tall (0)  Margins: smooth/ill-defined (0)  Echogenic foci: none (0)  Change: No  TI-RADS  "category: TR4 (5 points) - follow up is recommended at 1, 2, 3, and 5 years if 1 cm or greater; FNA is recommended if 1.5 cm or greater.  Nodule #3  Size: 0.8 cm x 0.5 cm x 0.5 cm  Location: Right lobe upper pole  Composition: cystic/almost completely cystic (0)   Echogenicity: anechoic (0)  Shape: wider-than-tall (0)  Margins: smooth (0)  Echogenic foci: none (0)  Change: No  TI-RADS category: TR1 (0 points) - no follow up required   Impression:  The thyroid gland is stable in appearance for roughly 9 years.  For this reason, no further follow-up is recommended for these nodules.    FNA: None    Signs or Symptoms:   Difficulty breathing: Denies  Difficulty swallowing: Luis E  Voice Changes: Denies  FH of thyroid cancer: Denies  Personal history of radiation treatment or exposure: Denies    Lab Results   Component Value Date    TSH 2.676 06/19/2020    FREET4 0.82 04/01/2009     Denies signs or symptoms of hyper or hypothyroidism    Review of Systems   Constitutional: Negative for fatigue.   Eyes: Negative for visual disturbance.   Respiratory: Negative for shortness of breath.    Cardiovascular: Negative for chest pain.   Gastrointestinal: Negative for abdominal pain.   Musculoskeletal: Positive for back pain. Negative for arthralgias.   Skin: Negative for wound.   Neurological: Negative for headaches.   Hematological: Does not bruise/bleed easily.   Psychiatric/Behavioral: Negative for sleep disturbance.     Objective:   Physical Exam  Vitals signs reviewed.   Neck:      Thyroid: No thyromegaly (irregular shaped gland).   Cardiovascular:      Rate and Rhythm: Normal rate.      Comments: No edema present  Pulmonary:      Effort: Pulmonary effort is normal.   Abdominal:      Palpations: Abdomen is soft.       Visit Vitals  /84 (BP Location: Right arm, Patient Position: Sitting, BP Method: Medium (Manual))   Resp 16   Ht 5' 8" (1.727 m)   Wt 99.6 kg (219 lb 9.3 oz)   LMP  (LMP Unknown)   BMI 33.39 kg/m² "     Injecting into scar tissue.    Body mass index is 33.39 kg/m².    Lab Review:   Lab Results   Component Value Date    HGBA1C 10.8 (H) 06/19/2020    HGBA1C 10.5 (H) 03/11/2020    HGBA1C 9.0 (H) 11/18/2019       Lab Results   Component Value Date    CHOL 175 02/20/2020    HDL 52 02/20/2020    LDLCALC 95.0 02/20/2020    TRIG 140 02/20/2020    CHOLHDL 29.7 02/20/2020     Lab Results   Component Value Date     06/19/2020    K 3.6 06/19/2020     06/19/2020    CO2 30 (H) 06/19/2020     (H) 06/19/2020    BUN 20 06/19/2020    CREATININE 1.4 06/19/2020    CALCIUM 9.1 06/19/2020    PROT 7.7 06/19/2020    ALBUMIN 3.4 (L) 06/19/2020    BILITOT 0.3 06/19/2020    ALKPHOS 87 06/19/2020    AST 26 06/19/2020    ALT 19 06/19/2020    ANIONGAP 11 06/19/2020    ESTGFRAFRICA 40.9 (A) 06/19/2020    EGFRNONAA 35.5 (A) 06/19/2020    TSH 2.676 06/19/2020     Vit D, 25-Hydroxy   Date Value Ref Range Status   06/19/2020 64 30 - 96 ng/mL Final     Comment:     Vitamin D deficiency.........<10 ng/mL                              Vitamin D insufficiency......10-29 ng/mL       Vitamin D sufficiency........> or equal to 30 ng/mL  Vitamin D toxicity............>100 ng/mL       Assessment and Plan     1. Type 2 diabetes mellitus with hyperglycemia, with long-term current use of insulin  linaGLIPtin (TRADJENTA) 5 mg Tab tablet   2. Hyperparathyroidism     3. Hypovitaminosis D     4. Osteopenia, unspecified location     5. Thyroid nodule     6. Stage 3 chronic kidney disease     7. Coronary artery disease involving native coronary artery of native heart with angina pectoris     8. Essential hypertension     9. Hyperlipidemia, unspecified hyperlipidemia type     10. Diabetic polyneuropathy associated with type 2 diabetes mellitus     11. Obesity, diabetes, and hypertension syndrome       Type 2 diabetes mellitus with hyperglycemia  -- A1c goal 7.5-8.0%.  -- Medications discussed:  MFM - discontinued due to CKD in the  past  DPP4  SGLT2 - refused  Insulin   -- Reviewed logs/CGM:  Glucose variability.  Likely due to injecting into scar tissue.  Instructed to send glucose logs in 14 days.  Reach out to me sooner for any glucose <70 or consistently >250.  -- Dexcom AOB sent.    -- Could not identify trend in glucose due to significant variability.  Will start with rotating injection sites and addition of DPP4.  If Dexcom not covered and/or still having variability after the above, order professional CGM.  -- Medication Changes:   CONTINUE:  Educated on rotating injection sites  Lantus 60units nightly  Novolog 24units TIDAC   START:  Tradjenta 5mg daily. Discussed MOA and SE.  -- Reviewed goals of therapy are to get the best control we can without hypoglycemia.  -- Reviewed patient's current insulin regimen. Clarified proper insulin dose and timing in relation to meals, etc. Insulin injection sites and proper rotation instructed.    -- Advised frequent self blood glucose monitoring.  Patient encouraged to document glucose results and bring them to every clinic visit.  -- Hypoglycemia precautions discussed. Instructed on precautions before driving.    -- Call for Bg repeatedly < 90 or > 180.   -- Close adherence to lifestyle changes recommended.   -- Periodic follow ups for eye evaluations, foot care and dental care suggested.    Hyperparathyroidism  -- Secondary to renal. Calcium unremarkable.     Hypovitaminosis D  -- On Vit D3 1000iu daily.    Thyroid nodule  -- Thyroid US stable - no recommendations to FNA.  -- TFTs unremarkable.  -- Repeat US if clinical change.    Stage 3 chronic kidney disease  -- Lost to follow up with nephrology. Refer back.     Coronary artery disease involving native coronary artery of native heart with angina pectoris  -- Optimize glucose control.     Essential hypertension  -- Controlled on current medications.     Hyperlipidemia  -- Controlled.  -- On statin per ADA recommendations.    Diabetic neuropathy  associated with type 2 diabetes mellitus  -- Optimize glucose control.     Obesity, diabetes, and hypertension syndrome  -- Encouraged dietary and lifestyle modifications.  -- Emphasized weight loss goals.    Follow up in about 4 weeks (around 8/25/2020).

## 2020-07-28 ENCOUNTER — OFFICE VISIT (OUTPATIENT)
Dept: ENDOCRINOLOGY | Facility: CLINIC | Age: 80
End: 2020-07-28
Payer: MEDICARE

## 2020-07-28 VITALS
WEIGHT: 219.56 LBS | BODY MASS INDEX: 33.28 KG/M2 | RESPIRATION RATE: 16 BRPM | DIASTOLIC BLOOD PRESSURE: 84 MMHG | HEIGHT: 68 IN | SYSTOLIC BLOOD PRESSURE: 138 MMHG

## 2020-07-28 DIAGNOSIS — E78.5 HYPERLIPIDEMIA, UNSPECIFIED HYPERLIPIDEMIA TYPE: ICD-10-CM

## 2020-07-28 DIAGNOSIS — E11.42 DIABETIC POLYNEUROPATHY ASSOCIATED WITH TYPE 2 DIABETES MELLITUS: ICD-10-CM

## 2020-07-28 DIAGNOSIS — E11.69 OBESITY, DIABETES, AND HYPERTENSION SYNDROME: ICD-10-CM

## 2020-07-28 DIAGNOSIS — I15.2 OBESITY, DIABETES, AND HYPERTENSION SYNDROME: ICD-10-CM

## 2020-07-28 DIAGNOSIS — E04.1 THYROID NODULE: ICD-10-CM

## 2020-07-28 DIAGNOSIS — N18.30 STAGE 3 CHRONIC KIDNEY DISEASE: ICD-10-CM

## 2020-07-28 DIAGNOSIS — E11.59 OBESITY, DIABETES, AND HYPERTENSION SYNDROME: ICD-10-CM

## 2020-07-28 DIAGNOSIS — E21.3 HYPERPARATHYROIDISM: ICD-10-CM

## 2020-07-28 DIAGNOSIS — Z79.4 TYPE 2 DIABETES MELLITUS WITH HYPERGLYCEMIA, WITH LONG-TERM CURRENT USE OF INSULIN: Primary | ICD-10-CM

## 2020-07-28 DIAGNOSIS — I25.119 CORONARY ARTERY DISEASE INVOLVING NATIVE CORONARY ARTERY OF NATIVE HEART WITH ANGINA PECTORIS: ICD-10-CM

## 2020-07-28 DIAGNOSIS — E66.9 OBESITY, DIABETES, AND HYPERTENSION SYNDROME: ICD-10-CM

## 2020-07-28 DIAGNOSIS — E11.65 TYPE 2 DIABETES MELLITUS WITH HYPERGLYCEMIA, WITH LONG-TERM CURRENT USE OF INSULIN: Primary | ICD-10-CM

## 2020-07-28 DIAGNOSIS — M85.80 OSTEOPENIA, UNSPECIFIED LOCATION: ICD-10-CM

## 2020-07-28 DIAGNOSIS — I10 ESSENTIAL HYPERTENSION: Chronic | ICD-10-CM

## 2020-07-28 DIAGNOSIS — E55.9 HYPOVITAMINOSIS D: ICD-10-CM

## 2020-07-28 PROCEDURE — 1126F AMNT PAIN NOTED NONE PRSNT: CPT | Mod: HCNC,S$GLB,, | Performed by: NURSE PRACTITIONER

## 2020-07-28 PROCEDURE — 99214 PR OFFICE/OUTPT VISIT, EST, LEVL IV, 30-39 MIN: ICD-10-PCS | Mod: HCNC,S$GLB,, | Performed by: NURSE PRACTITIONER

## 2020-07-28 PROCEDURE — 1159F MED LIST DOCD IN RCRD: CPT | Mod: HCNC,S$GLB,, | Performed by: NURSE PRACTITIONER

## 2020-07-28 PROCEDURE — 1101F PR PT FALLS ASSESS DOC 0-1 FALLS W/OUT INJ PAST YR: ICD-10-PCS | Mod: HCNC,CPTII,S$GLB, | Performed by: NURSE PRACTITIONER

## 2020-07-28 PROCEDURE — 3079F DIAST BP 80-89 MM HG: CPT | Mod: HCNC,CPTII,S$GLB, | Performed by: NURSE PRACTITIONER

## 2020-07-28 PROCEDURE — 99214 OFFICE O/P EST MOD 30 MIN: CPT | Mod: HCNC,S$GLB,, | Performed by: NURSE PRACTITIONER

## 2020-07-28 PROCEDURE — 99499 RISK ADDL DX/OHS AUDIT: ICD-10-PCS | Mod: HCNC,S$GLB,, | Performed by: NURSE PRACTITIONER

## 2020-07-28 PROCEDURE — 1101F PT FALLS ASSESS-DOCD LE1/YR: CPT | Mod: HCNC,CPTII,S$GLB, | Performed by: NURSE PRACTITIONER

## 2020-07-28 PROCEDURE — 99499 UNLISTED E&M SERVICE: CPT | Mod: HCNC,S$GLB,, | Performed by: NURSE PRACTITIONER

## 2020-07-28 PROCEDURE — 99999 PR PBB SHADOW E&M-EST. PATIENT-LVL V: CPT | Mod: PBBFAC,HCNC,, | Performed by: NURSE PRACTITIONER

## 2020-07-28 PROCEDURE — 99999 PR PBB SHADOW E&M-EST. PATIENT-LVL V: ICD-10-PCS | Mod: PBBFAC,HCNC,, | Performed by: NURSE PRACTITIONER

## 2020-07-28 PROCEDURE — 3075F SYST BP GE 130 - 139MM HG: CPT | Mod: HCNC,CPTII,S$GLB, | Performed by: NURSE PRACTITIONER

## 2020-07-28 PROCEDURE — 1126F PR PAIN SEVERITY QUANTIFIED, NO PAIN PRESENT: ICD-10-PCS | Mod: HCNC,S$GLB,, | Performed by: NURSE PRACTITIONER

## 2020-07-28 PROCEDURE — 3075F PR MOST RECENT SYSTOLIC BLOOD PRESS GE 130-139MM HG: ICD-10-PCS | Mod: HCNC,CPTII,S$GLB, | Performed by: NURSE PRACTITIONER

## 2020-07-28 PROCEDURE — 3079F PR MOST RECENT DIASTOLIC BLOOD PRESSURE 80-89 MM HG: ICD-10-PCS | Mod: HCNC,CPTII,S$GLB, | Performed by: NURSE PRACTITIONER

## 2020-07-28 PROCEDURE — 1159F PR MEDICATION LIST DOCUMENTED IN MEDICAL RECORD: ICD-10-PCS | Mod: HCNC,S$GLB,, | Performed by: NURSE PRACTITIONER

## 2020-07-28 RX ORDER — LINAGLIPTIN 5 MG/1
5 TABLET, FILM COATED ORAL DAILY
Qty: 90 TABLET | Refills: 3 | Status: SHIPPED | OUTPATIENT
Start: 2020-07-28 | End: 2021-07-15

## 2020-07-28 NOTE — PATIENT INSTRUCTIONS
Call me ASAP if glucose is in the 70s, if Tradjenta is not covered and if you get a call about Dexcom.     Instructed to send glucose logs in 4 weeks.  Reach out to me sooner for any glucose <70 or consistently >250.    Hypoglycemia (Low Blood Glucose)  Low blood glucose occurs with the following conditions.  · Not Enough Food or Missing Meal.  · Too Much Insulin  · More Exercise Than Usual    It is best to use something that you can always carry with you. Choose a food that is all carbohydrate because it will be very fast acting. Try not to choose chocolate or other high fat foods. They will not work fast enough and you may also end up over-treating your lows. The suggested amount of carbohydrate to start with is 15 grams. Don't keep eating until you feel better. Eat the required amount and stop. The feelings will pass and you will be grateful that you did not overdo it.    Some people with diabetes know when their blood glucose is low and some do not. If you are a person who is not aware of hypoglycemia, it is important to test your blood glucose more often. Everyone with diabetes should test before driving a car to assure safety on the road. Blood glucose should be above 100 mg/dl before driving and at bedtime.     The symptoms of low blood sugars are usually heart racing, sweating, anxiety, feeling hungry, tremor, weakness or most severely loss of consciousness.     Rule of 15:    Test your blood sugar   If glucose is between 50-70 mg/dL then ingest 15 grams of fast-acting carbs   If glucose is less than 50 mg/dL then ingest 30 grams of fast-acting carbs   Ingest 15 grams of fast-acting carbohydrate - such as:   a. 3-4 glucose tablets  b. 4 oz juice  c. ½ can regular soda pop  d. 15 skittles or mini jelly beans    Re-check your blood sugar in 15 minutes. If its less than 70mg/dl, repeat steps 2 and 3.   If your next meal is more than 1 hour away, eat an additional 15 grams of carbohydrate and 1 ounce of protein  (examples include crackers with cheese or one-half of a sandwich with peanut butter). It is important not to eat too much because this can raise your blood sugar above the target level.    After your blood sugar has normalized, think about why you went low. If you notice a pattern of low blood sugars, contact your Diabetes Team. We may need to adjust your medication.

## 2020-07-28 NOTE — ASSESSMENT & PLAN NOTE
-- Thyroid US stable - no recommendations to FNA.  -- TFTs unremarkable.  -- Repeat US if clinical change.

## 2020-07-28 NOTE — ASSESSMENT & PLAN NOTE
-- A1c goal 7.5-8.0%.  -- Medications discussed:  MFM - discontinued due to CKD in the past  DPP4  SGLT2 - refused  Insulin   -- Reviewed logs/CGM:  Glucose variability.  Likely due to injecting into scar tissue.  Instructed to send glucose logs in 14 days.  Reach out to me sooner for any glucose <70 or consistently >250.  -- Dexcom AOB sent.    -- Could not identify trend in glucose due to significant variability.  Will start with rotating injection sites and addition of DPP4.  If Dexcom not covered and/or still having variability after the above, order professional CGM.  -- Medication Changes:   CONTINUE:  Educated on rotating injection sites  Lantus 60units nightly  Novolog 24units TIDAC   START:  Tradjenta 5mg daily. Discussed MOA and SE.  -- Reviewed goals of therapy are to get the best control we can without hypoglycemia.  -- Reviewed patient's current insulin regimen. Clarified proper insulin dose and timing in relation to meals, etc. Insulin injection sites and proper rotation instructed.    -- Advised frequent self blood glucose monitoring.  Patient encouraged to document glucose results and bring them to every clinic visit.  -- Hypoglycemia precautions discussed. Instructed on precautions before driving.    -- Call for Bg repeatedly < 90 or > 180.   -- Close adherence to lifestyle changes recommended.   -- Periodic follow ups for eye evaluations, foot care and dental care suggested.

## 2020-08-06 ENCOUNTER — PES CALL (OUTPATIENT)
Dept: ADMINISTRATIVE | Facility: CLINIC | Age: 80
End: 2020-08-06

## 2020-08-10 RX ORDER — OXYCODONE AND ACETAMINOPHEN 10; 325 MG/1; MG/1
TABLET ORAL
Qty: 150 TABLET | Refills: 0 | Status: CANCELLED | OUTPATIENT
Start: 2020-08-10

## 2020-08-10 RX ORDER — OXYCODONE AND ACETAMINOPHEN 10; 325 MG/1; MG/1
TABLET ORAL
Qty: 150 TABLET | Refills: 0 | Status: SHIPPED | OUTPATIENT
Start: 2020-08-10 | End: 2020-09-08 | Stop reason: SDUPTHER

## 2020-08-10 NOTE — TELEPHONE ENCOUNTER
----- Message from Keyla Urbina sent at 8/10/2020 10:24 AM CDT -----  Contact: patient [phone]  Requesting an RX refill or new RX.  Is this a refill or new RX:  refill  RX name and strength: oxyCODONE-acetaminophen (PERCOCET)  mg per tablet  Directions (copy/paste from chart):  Take one tablet by mouth 5 times daily  Is this a 30 day or 90 day RX:  30  Local pharmacy or mail order pharmacy:    Pharmacy name and phone # (copy/paste from chart):   [facility] [phone] (Phone)   Comments:

## 2020-08-28 ENCOUNTER — PATIENT OUTREACH (OUTPATIENT)
Dept: ADMINISTRATIVE | Facility: HOSPITAL | Age: 80
End: 2020-08-28

## 2020-08-28 DIAGNOSIS — E11.42 DIABETIC POLYNEUROPATHY ASSOCIATED WITH TYPE 2 DIABETES MELLITUS: Primary | ICD-10-CM

## 2020-09-08 RX ORDER — OXYCODONE AND ACETAMINOPHEN 10; 325 MG/1; MG/1
TABLET ORAL
Qty: 150 TABLET | Refills: 0 | Status: SHIPPED | OUTPATIENT
Start: 2020-09-08 | End: 2020-10-16

## 2020-09-08 NOTE — TELEPHONE ENCOUNTER
----- Message from Ngoc Coy sent at 9/8/2020  1:53 PM CDT -----  Regarding: refill  Contact: patient [phone]  Requesting an RX refill or new RX.  Is this a refill or new RX:  refill  RX name and strength: oxyCODONE-acetaminophen (PERCOCET)  mg per tablet  Directions (copy/paste from chart):  Take one tablet by mouth 5 times daily  Is this a 30 day or 90 day RX:  30  Local pharmacy or mail order pharmacy:  local  Pharmacy name and phone # [facility] [phone] (Phone)  [phone] (Fax)    Will  Friday at 's Appointment

## 2020-09-11 ENCOUNTER — IMMUNIZATION (OUTPATIENT)
Dept: PHARMACY | Facility: CLINIC | Age: 80
End: 2020-09-11
Payer: MEDICARE

## 2020-09-11 ENCOUNTER — OFFICE VISIT (OUTPATIENT)
Dept: INTERNAL MEDICINE | Facility: CLINIC | Age: 80
End: 2020-09-11
Payer: MEDICARE

## 2020-09-11 VITALS
BODY MASS INDEX: 34.32 KG/M2 | OXYGEN SATURATION: 97 % | DIASTOLIC BLOOD PRESSURE: 80 MMHG | SYSTOLIC BLOOD PRESSURE: 130 MMHG | HEIGHT: 68 IN | WEIGHT: 226.44 LBS | HEART RATE: 83 BPM

## 2020-09-11 DIAGNOSIS — I50.32 CHRONIC DIASTOLIC CONGESTIVE HEART FAILURE: ICD-10-CM

## 2020-09-11 DIAGNOSIS — E78.5 HYPERLIPIDEMIA, UNSPECIFIED HYPERLIPIDEMIA TYPE: ICD-10-CM

## 2020-09-11 DIAGNOSIS — M54.16 LUMBAR RADICULOPATHY: ICD-10-CM

## 2020-09-11 DIAGNOSIS — M10.9 GOUT, ARTHRITIS: ICD-10-CM

## 2020-09-11 DIAGNOSIS — I10 ESSENTIAL HYPERTENSION: Chronic | ICD-10-CM

## 2020-09-11 DIAGNOSIS — K21.9 GASTROESOPHAGEAL REFLUX DISEASE WITHOUT ESOPHAGITIS: ICD-10-CM

## 2020-09-11 DIAGNOSIS — Z00.00 ROUTINE GENERAL MEDICAL EXAMINATION AT A HEALTH CARE FACILITY: Primary | ICD-10-CM

## 2020-09-11 PROCEDURE — 3075F PR MOST RECENT SYSTOLIC BLOOD PRESS GE 130-139MM HG: ICD-10-PCS | Mod: HCNC,CPTII,S$GLB, | Performed by: INTERNAL MEDICINE

## 2020-09-11 PROCEDURE — 99999 PR PBB SHADOW E&M-EST. PATIENT-LVL III: ICD-10-PCS | Mod: PBBFAC,HCNC,, | Performed by: INTERNAL MEDICINE

## 2020-09-11 PROCEDURE — 3075F SYST BP GE 130 - 139MM HG: CPT | Mod: HCNC,CPTII,S$GLB, | Performed by: INTERNAL MEDICINE

## 2020-09-11 PROCEDURE — 99999 PR PBB SHADOW E&M-EST. PATIENT-LVL III: CPT | Mod: PBBFAC,HCNC,, | Performed by: INTERNAL MEDICINE

## 2020-09-11 PROCEDURE — 99397 PR PREVENTIVE VISIT,EST,65 & OVER: ICD-10-PCS | Mod: HCNC,S$GLB,, | Performed by: INTERNAL MEDICINE

## 2020-09-11 PROCEDURE — 99397 PER PM REEVAL EST PAT 65+ YR: CPT | Mod: HCNC,S$GLB,, | Performed by: INTERNAL MEDICINE

## 2020-09-11 PROCEDURE — 3079F PR MOST RECENT DIASTOLIC BLOOD PRESSURE 80-89 MM HG: ICD-10-PCS | Mod: HCNC,CPTII,S$GLB, | Performed by: INTERNAL MEDICINE

## 2020-09-11 PROCEDURE — 3079F DIAST BP 80-89 MM HG: CPT | Mod: HCNC,CPTII,S$GLB, | Performed by: INTERNAL MEDICINE

## 2020-09-11 RX ORDER — OXYCODONE AND ACETAMINOPHEN 10; 325 MG/1; MG/1
1 TABLET ORAL
Qty: 150 TABLET | Refills: 0 | Status: SHIPPED | OUTPATIENT
Start: 2020-10-08 | End: 2020-10-08 | Stop reason: SDUPTHER
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
## 2020-09-18 ENCOUNTER — OFFICE VISIT (OUTPATIENT)
Dept: PAIN MEDICINE | Facility: CLINIC | Age: 80
End: 2020-09-18
Payer: MEDICARE

## 2020-09-18 VITALS
WEIGHT: 226.88 LBS | SYSTOLIC BLOOD PRESSURE: 132 MMHG | DIASTOLIC BLOOD PRESSURE: 83 MMHG | TEMPERATURE: 97 F | HEIGHT: 68 IN | HEART RATE: 75 BPM | BODY MASS INDEX: 34.38 KG/M2

## 2020-09-18 DIAGNOSIS — M51.36 DDD (DEGENERATIVE DISC DISEASE), LUMBAR: ICD-10-CM

## 2020-09-18 DIAGNOSIS — M79.18 MYOFASCIAL PAIN: ICD-10-CM

## 2020-09-18 DIAGNOSIS — M50.30 DDD (DEGENERATIVE DISC DISEASE), CERVICAL: ICD-10-CM

## 2020-09-18 DIAGNOSIS — G89.4 CHRONIC PAIN DISORDER: ICD-10-CM

## 2020-09-18 DIAGNOSIS — M48.062 SPINAL STENOSIS OF LUMBAR REGION WITH NEUROGENIC CLAUDICATION: ICD-10-CM

## 2020-09-18 DIAGNOSIS — M54.16 LUMBAR RADICULOPATHY: Primary | ICD-10-CM

## 2020-09-18 DIAGNOSIS — M47.816 LUMBAR SPONDYLOSIS: ICD-10-CM

## 2020-09-18 DIAGNOSIS — M47.812 CERVICAL SPONDYLOSIS: ICD-10-CM

## 2020-09-18 PROCEDURE — 1101F PT FALLS ASSESS-DOCD LE1/YR: CPT | Mod: HCNC,CPTII,S$GLB, | Performed by: NURSE PRACTITIONER

## 2020-09-18 PROCEDURE — 99213 OFFICE O/P EST LOW 20 MIN: CPT | Mod: HCNC,S$GLB,, | Performed by: NURSE PRACTITIONER

## 2020-09-18 PROCEDURE — 1159F PR MEDICATION LIST DOCUMENTED IN MEDICAL RECORD: ICD-10-PCS | Mod: HCNC,S$GLB,, | Performed by: NURSE PRACTITIONER

## 2020-09-18 PROCEDURE — 1125F AMNT PAIN NOTED PAIN PRSNT: CPT | Mod: HCNC,S$GLB,, | Performed by: NURSE PRACTITIONER

## 2020-09-18 PROCEDURE — 3075F PR MOST RECENT SYSTOLIC BLOOD PRESS GE 130-139MM HG: ICD-10-PCS | Mod: HCNC,CPTII,S$GLB, | Performed by: NURSE PRACTITIONER

## 2020-09-18 PROCEDURE — 3075F SYST BP GE 130 - 139MM HG: CPT | Mod: HCNC,CPTII,S$GLB, | Performed by: NURSE PRACTITIONER

## 2020-09-18 PROCEDURE — 99213 PR OFFICE/OUTPT VISIT, EST, LEVL III, 20-29 MIN: ICD-10-PCS | Mod: HCNC,S$GLB,, | Performed by: NURSE PRACTITIONER

## 2020-09-18 PROCEDURE — 1101F PR PT FALLS ASSESS DOC 0-1 FALLS W/OUT INJ PAST YR: ICD-10-PCS | Mod: HCNC,CPTII,S$GLB, | Performed by: NURSE PRACTITIONER

## 2020-09-18 PROCEDURE — 1125F PR PAIN SEVERITY QUANTIFIED, PAIN PRESENT: ICD-10-PCS | Mod: HCNC,S$GLB,, | Performed by: NURSE PRACTITIONER

## 2020-09-18 PROCEDURE — 1159F MED LIST DOCD IN RCRD: CPT | Mod: HCNC,S$GLB,, | Performed by: NURSE PRACTITIONER

## 2020-09-18 PROCEDURE — 3079F PR MOST RECENT DIASTOLIC BLOOD PRESSURE 80-89 MM HG: ICD-10-PCS | Mod: HCNC,CPTII,S$GLB, | Performed by: NURSE PRACTITIONER

## 2020-09-18 PROCEDURE — 99999 PR PBB SHADOW E&M-EST. PATIENT-LVL V: CPT | Mod: PBBFAC,HCNC,, | Performed by: NURSE PRACTITIONER

## 2020-09-18 PROCEDURE — 99999 PR PBB SHADOW E&M-EST. PATIENT-LVL V: ICD-10-PCS | Mod: PBBFAC,HCNC,, | Performed by: NURSE PRACTITIONER

## 2020-09-18 PROCEDURE — 3079F DIAST BP 80-89 MM HG: CPT | Mod: HCNC,CPTII,S$GLB, | Performed by: NURSE PRACTITIONER

## 2020-09-18 NOTE — H&P (VIEW-ONLY)
Chronic Pain - Established Visit    Referring Physician: No ref. provider found    Chief Complaint:   Chief Complaint   Patient presents with    Low-back Pain    Neck Pain        SUBJECTIVE:    Interval History 9/18/2020:  The patient returns to clinic today for follow up of neck and low back pain. She was scheduled for lumbar JANEE but this was canceled. She continues to report low back pain that radiates down the lateral aspect of both legs to her ankles. She reports increased pain with prolonged activity. She reports increased neck pain that is constant, sharp, and aching. She denies any radiating arm pain. Her pain is worse with turning her head to the side. She reports difficulty sleeping due to pain. She denies any other health changes. Her pain today is 8/10.    Interval History 7/9/2020:  The patient returns to clinic today for follow up of neck pain. She is s/p bilateral C4,5,6 MBB on 6/24/2020. She reports 90% relief of her neck pain. She continues to report benefit. She reports intermittent neck pain that is tolerable at this time. She denies any radiating arm pain. She does report increased low back pain that intermittently radiates down the posterolateral aspect of her legs to her ankles bilaterally. This pain is worse with activity. She denies any other health changes. Her pain today is 8/10.    Interval History 6/15/2020:  The patient presents for follow up of severe Left>R sided neck pain with radiation into shoulder and states pain does not go below shoulders. Pt was scheduled for B C4,5,6 MBB but secondary to covid she had to reschedule. The patient denies myelopathic symptoms such as handwriting changes or difficulty with buttons/coins/keys. Denies perineal paresthesias, bowel/bladder dysfunction. She states her CBGs are in low 100s at this time. She is taking Robaxin PRN.     Interval History 4/3/2020:  Follow up is for neck pain. She was scheduled for C4, C5, C6 MBB on 3/20/20 but this had to be  canceled for now due to the covid epidemic. At last visit her tizanidine prescription was changed to robaxan. She says her pain is tolerable with robaxan and Biofreeze which she purchased over-the-counter.  Denies any numbness, weakness, bowel/bladder changes.     Interval History 3/4/2020:  The patient returns to clinic today for follow up of neck pain. She reports that trigger point injections at last visit provided significant relief until the last week. She reports neck pain that is sharp and aching in nature. She denies any radiating arm pain. Her pain is worse with turning her head to the side. She reports difficulty doing housework due to pain. She is currently taking Zanaflex with limited relief. She does take Percocet per her PCP with benefit. She denies any other health changes. Her pain today is 9/10.        Interval History 1/30/2020:  The patient is here to discuss new onset neck pain.  It is mainly located over the left neck and shoulder area.  She denies radiation past the shoulder.  She denies numbness and tingling.  She denies weakness.  She had a recent ED visit for dizziness.  She had CT scans to rule out clot.  She would like an injection today.  She denies bowel or bladder changes.  Her pain today is 10/10.    Interval History 12/3/2019:  The patient is here for follow up of back pain.  Her pain is radiating down the sides of both legs with numbness.  Her BP remains elevated today.  She is followed by her PCP for this.  She denies SOB or chest pain today.  She thinks that it is always high when she comes to [employer] due to walking far from the garage.  She did have benefit with previous bilateral L4-5 TF JANEE.  Her pain today is 4/10.  The patient denies any bowel or bladder incontinence or signs of saddle paresthesia.  The patient denies any major medical changes since last office visit.    Interval History 10/1/2019:  The patient is here for follow up today of back and leg pain.  She is s/p  bilateral L4-5 TF JANEE with 50% relief.  She says that her pain is more tolerable.  She has more pain with walking, such as walking to our office today.  She has persistent burning to both feet.  She has a known history of diabetes.  Her pain is worse at night and she describes it as burning.  She has intermittent radiation down the sides of both legs.  Her back pain is greater than her leg pain.  She takes Percocet from her PCP with benefit.  She would like to repeat Healthy Back.  She feels as though this was helpful and would like to restart.  Her pain today is 7/10.  Her blood pressure is elevated today.  She says that she has not taken her medication today because she was rushing to get to her appointment.  She denies headache, SOB, or chest pain.     Initial Encounter:  Henrietta Villasenor presents to the clinic for the evaluation of low back pain. The pain started and symptoms have been worsening.The pain is located in the low back area and radiates to the bilateral leg swelling.  The pain is described as sharp, shooting, throbbing, tingling and constant and is rated as 5/10. The pain is rated with a score of  5/10 on the BEST day and a score of 8/10 on the WORST day.  Symptoms interfere with daily activity and sleeping. The pain is exacerbated by Standing, Bending, Coughing/Sneezing, Walking, Morning, Lifting and Getting out of bed/chair.  The pain is mitigated by medications. She reports spending 0 hours per day reclining. The patient reports 2 hours of uninterrupted sleep per night.    Patient denies night fever/night sweats, urinary incontinence, bowel incontinence, significant weight loss, significant motor weakness and loss of sensations.    Physical Therapy/Home Exercise: yes      Pain Disability Index Review:  Last 3 PDI Scores 9/18/2020 7/9/2020 6/15/2020   Pain Disability Index (PDI) 49 60 54       Pain Medications:    - Opioids: Percocet (Oxycodone/Acetaminophen)  - Adjuvant Medications: Cymbalta (  Duloxetine) and Tizanidine  - Anti-Coagulants: None  - Others: None     report:  Reviewed and consistent with medication use as prescribed.    Pain Procedures:   9/17/19 Bilateral L4-5 TF JANEE- 50% relief  6/24/2020- Bilateral C4,5,6 MBB-90% relief    Imaging:  CT Cervical Spine 1/2/2020:    FINDINGS:  No evidence of acute cervical spine fracture or dislocation.  Odontoid process is intact.  Craniocervical junction is unremarkable.  Cervical spine alignment is within normal limits.  Prominent multilevel degenerative changes are seen with DISH.    Surrounding soft tissues show no significant abnormalities.  Airway is patent.      Impression       No evidence of acute cervical spine fracture or dislocation.         6/12/18 MRI Lumbar  Narrative     EXAMINATION:  MRI LUMBAR SPINE WITHOUT CONTRAST    CLINICAL HISTORY:  low back and right leg pain; Other intervertebral disc degeneration, lumbar region    TECHNIQUE:  Multiplanar, multisequence MR images were acquired from the thoracolumbar junction to the sacrum without the administration of contrast.    COMPARISON:  Radiograph 06/05/2018    FINDINGS:  Alignment: Normal.    Vertebrae: Normal marrow signal. No fracture.    Discs: Normal height and signal.    Cord: Normal.  Conus terminates at superior border of L1    Degenerative findings:    T12-L1: Mild broad-based disc bulge without compressive sequelae.    L1-L2: Unremarkable.    L2-L3: Mild broad-based disc bulge with facet arthropathy.  No compressive sequelae.    L3-L4: Broad-based disc bulge and facet arthropathy.  There is mild bilateral neural foraminal narrowing.    L4-L5: Trace anterolisthesis of L4 on L5 with broad-based disc bulge and facet arthropathy.  This results in moderate bilateral neural foraminal narrowing.  Moderate canal stenosis.  There is also significant narrowing of the left greater than right lateral recesses.    L5-S1: Broad-based disc bulge without significant compressive  sequelae.    Paraspinal muscles & soft tissues: Paraspinal atrophy.  No concerning findings in the visualized abdomen.  Add      Impression       Moderate degenerative change at L4-5.  Milder degenerative changes elsewhere in the lumbar spine.     6/5/18 Xray Lumbar  Narrative     EXAMINATION:  XR LUMBAR SPINE 5 VIEW WITH FLEX AND EXT    CLINICAL HISTORY:  low back and right leg pain;  Other intervertebral disc degeneration, lumbar region    TECHNIQUE:  Five views of the lumbar spine plus flexion extension views were performed.    COMPARISON:  02/03/2015    FINDINGS:  The lumbar vertebra are intact.  No compression fracture is identified.  Degenerative changes are again seen with small osteophytes at multiple levels.  L4-5 disc space is mildly narrowed.  Degenerative changes are also noted at lower facet joints.  There is associated grade 1 spondylolisthesis at L4-5 which is stable between flexion and extension at about 6 mm.  Alignment is otherwise satisfactory.    Visualized abdomen shows aortic atherosclerosis and surgical clips in the right upper quadrant.      Impression       No acute abnormality.  Chronic degenerative spine changes.           Past Medical History:   Diagnosis Date    Abnormality of lung 11/08/2011    Stable bandlike opacities at the lung bases, most likely representing      Anxiety     Arthritis     CAD (coronary artery disease)     Calculus of ureter 2/22/2011    CHF (congestive heart failure)     Chronic back pain 7/29/2012    Colon polyp 9/2010; 11/2010    Colon polyps 7/29/2012    Coronary artery disease involving native coronary artery of native heart with angina pectoris     Depression     Diabetes mellitus     Diabetes mellitus type II     Diverticulitis large intestine 7/27/2015    Diverticulitis of large intestine without perforation or abscess without bleeding     Diverticulosis 09/25/2010; 11/02/2010; 11/08/2011; 7/29/2012    Duodenal disorder 08/25/2011     Duodenal erosion noted on EGD.    Duodenal ulcer, unspecified as acute or chronic, without hemorrhage, perforation, or obstruction 8/24/2011    E. coli sepsis 12/2010    Due to left ureteral stone with left nephrostomy tube - hospitalized in [place]    Esophageal dysmotility 01/24/2012    Noted on upper GI-barium swallow.    Facial weakness 1969    Left facial weakness s/p left mastoidectomy in ~ 1969.    Fatty liver 11/08/2011    Reported on CT-abdomen and in 06/2012 Gastro clinic visit note.    Gastric polyp 09/29/2010    GERD (gastroesophageal reflux disease) 7/29/2012    Hepatomegaly 11/08/2011    Reported on CT-abdomen    Herpes zoster with other nervous system complications(053.19) 2/28/2011    Hiatal hernia 06/26/2006; 09/29/2010; 08/25/2011    Noted on barium swallow 2006; noted on  EGD 2011.   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    Trouble in sleeping     Type II or unspecified type diabetes mellitus with neurological manifestations, not stated as uncontrolled(250.60)     Type II or unspecified type diabetes mellitus with peripheral circulatory disorders, not stated as uncontrolled(250.70)     Type II or unspecified type diabetes mellitus with renal manifestations, not stated as  uncontrolled(250.40)      Past Surgical History:   Procedure Laterality Date    BELPHAROPTOSIS REPAIR      s/p LAMBERTO levator repair - Dr. Dejesus    CARDIAC CATHETERIZATION      CARPAL TUNNEL RELEASE  3/13/2012    CATARACT EXTRACTION W/  INTRAOCULAR LENS IMPLANT Right 10/31/2018        CATARACT EXTRACTION W/  INTRAOCULAR LENS IMPLANT Left 03/22/2018        CHOLECYSTECTOMY      COLONOSCOPY N/A 11/16/2016    Procedure: COLONOSCOPY;  Surgeon: Chris Storey MD;  Location: [facility] ENDO (4TH FLR);  Service: Endoscopy;  Laterality: N/A;    COLONOSCOPY N/A 6/14/2017    Procedure: COLONOSCOPY;  Surgeon: Chris Storey MD;  Location: [facility] ENDO (4TH FLR);  Service: Endoscopy;  Laterality: N/A;  colonoscopy in 3 months with better bowel prep. - Split PEG prep ordered    EXTRACORPOREAL SHOCK WAVE LITHOTRIPSY      INJECTION OF ANESTHETIC AGENT AROUND NERVE Bilateral 6/24/2020    Procedure: BLOCK, NERVE, C4-C5-C6 MEDIAL BRANCH ok per [place] to add on;  Surgeon: Oscar Ravi MD;  Location: [facility] PAIN MGT;  Service: Pain Management;  Laterality: Bilateral;    INTRAOCULAR PROSTHESES INSERTION Right 10/31/2018    Procedure: INSERTION-INTRAOCULAR LENS (IOL);  Surgeon: Keysha Valle MD;  Location: [facility] OR;  Service: Ophthalmology;  Laterality: Right;    JOINT REPLACEMENT      mastoid tumor removal  1969    Left mastoidectomy with residual left facial weakness.    NEPHROSTOMY      OOPHORECTOMY      PHACOEMULSIFICATION OF CATARACT Right 10/31/2018    Procedure: PHACOEMULSIFICATION-ASPIRATION-CATARACT;  Surgeon: Keysha Valle MD;  Location: [facility] OR;  Service: Ophthalmology;  Laterality: Right;    TOTAL ABDOMINAL HYSTERECTOMY  1969    TAHBSO    TOTAL KNEE ARTHROPLASTY  9/13/2012    Left    TRANSFORAMINAL EPIDURAL INJECTION OF STEROID Bilateral 9/17/2019    Procedure: INJECTION, STEROID, EPIDURAL, TRANSFORAMINAL APPROACH;  Surgeon: Oscar Ravi MD;  Location: [facility] PAIN MGT;  Service: Pain  Management;  Laterality: Bilateral;  B/L TFESI L4/L5     Social History     Socioeconomic History    Marital status: Single     Spouse name: Not on file    Number of children: 5    Years of education: Not on file    Highest education level: Not on file   Occupational History    Not on file   Social Needs    Financial resource strain: Somewhat hard    Food insecurity     Worry: Sometimes true     Inability: Often true    Transportation needs     Medical: No     Non-medical: No   Tobacco Use    Smoking status: Former Smoker     Packs/day: 1.00     Years: 15.00     Pack years: 15.00     Types: Cigarettes     Quit date: 2000     Years since quittin.1    Smokeless tobacco: Never Used   Substance and Sexual Activity    Alcohol use: No    Drug use: No    Sexual activity: Never   Lifestyle    Physical activity     Days per week: 0 days     Minutes per session: 0 min    Stress: Very much   Relationships    Social connections     Talks on phone: More than three times a week     Gets together: Once a week     Attends [employer] service: More than 4 times per year     Active member of club or organization: Yes     Attends meetings of clubs or organizations: 1 to 4 times per year     Relationship status: Never    Other Topics Concern    Are you pregnant or think you may be? Not Asked    Breast-feeding Not Asked   Social History Narrative    Single with 5 children. Lives alone. Retired cook.     Family History   Problem Relation Age of Onset    Sleep apnea Sister     Diabetes Sister     Cancer Mother         brain tumor    Hypertension Mother     Heart disease Father     Heart attack Father     Dementia Brother     Lupus Brother     Frontotemporal dementia Brother     No Known Problems Daughter     No Known Problems Son     Diabetes Sister     Lupus Sister     Breast cancer Sister     Diabetes Sister     Cancer Sister         breast cancer    Heart attack Sister     Diabetes  Sister     Liver cancer Brother     Drug abuse Brother     Alcohol abuse Brother     Cirrhosis Brother     Drug abuse Brother     No Known Problems Daughter     No Known Problems Son     No Known Problems Son     No Known Problems Maternal Grandmother     No Known Problems Maternal Grandfather     No Known Problems Paternal Grandmother     No Known Problems Paternal Grandfather     Diabetes Brother     Dementia Brother     Diabetes Other     Ovarian cancer Other         Niece     Breast cancer Maternal Aunt     No Known Problems Maternal Uncle     No Known Problems Paternal Aunt     No Known Problems Paternal Uncle     Glaucoma Neg Hx     Blindness Neg Hx     Celiac disease Neg Hx     Colon cancer Neg Hx     Colon polyps Neg Hx     Esophageal cancer Neg Hx     Inflammatory bowel disease Neg Hx     Liver disease Neg Hx     Rectal cancer Neg Hx     Stomach cancer Neg Hx     Ulcerative colitis Neg Hx     Melanoma Neg Hx     Multiple sclerosis Neg Hx     Psoriasis Neg Hx     Amblyopia Neg Hx     Cataracts Neg Hx     Macular degeneration Neg Hx     Retinal detachment Neg Hx     Strabismus Neg Hx     Stroke Neg Hx     Thyroid disease Neg Hx        Review of patient's allergies indicates:   Allergen Reactions    Crestor [rosuvastatin]      Cramping in legs    Ezetimibe Diarrhea     Other reaction(s): abdominal pain, Diarrhea      Hydrocodone Itching    Lisinopril      Other reaction(s): cough      Sulfa (sulfonamide antibiotics) Itching and Rash           Sulfamethoxazole     Sulfamethoxazole-trimethoprim     Valsartan Swelling       Current Outpatient Medications   Medication Sig    ACCU-CHEK SMARTVIEW TEST STRIP Strp TEST THREE TIMES DAILY    albuterol (VENTOLIN HFA) 90 mcg/actuation inhaler Inhale 2 puffs by mouth into the lungs every 4 (four) hours as needed for Wheezing. Rescue    allopurinol (ZYLOPRIM) 100 MG tablet Take 1 tablet (100 mg total) by mouth once daily.  "   amLODIPine (NORVASC) 5 MG tablet Take 1 tablet (5 mg total) by mouth once daily.    aspirin (ECOTRIN) 81 MG EC tablet Take 1 tablet by mouth Daily.    atorvastatin (LIPITOR) 40 MG tablet Take 1 tablet (40 mg total) by mouth once daily.    BD ALCOHOL SWABS PadM USE  WHEN  TESTING BLOOD SUGAR FOUR TIMES DAILY    BD ULTRA-FINE OLU PEN NEEDLES 32 x 5/32 " Ndle Uses 4 times daily, on multiple daily insulin injections    blood sugar diagnostic Strp One Touch verio test strips - check blood sugar three times daily    blood-glucose meter kit Use as instructed    carvediloL (COREG) 25 MG tablet Take 1 tablet (25 mg total) by mouth 2 (two) times daily.    cholecalciferol, vitamin D3, (DIALYVITE VITAMIN D) 5,000 unit capsule Take 5,000 Units by mouth once daily.    DULoxetine (CYMBALTA) 60 MG capsule Take 1 capsule (60 mg total) by mouth 2 (two) times daily.    fluticasone (FLONASE) 50 mcg/actuation nasal spray SHAKE LQ AND U 2 SPRAYS IEN QD    furosemide (LASIX) 40 MG tablet TAKE 1 TABLET BY MOUTH TWICE DAILY    insulin (LANTUS SOLOSTAR U-100 INSULIN) glargine 100 units/mL (3mL) SubQ pen INJECT  55-60 UNITS SUBCUTANEOUSLY  AT  NIGHT    insulin aspart (NOVOLOG) 100 unit/mL InPn pen 23 units with meals plus correction scale. Max daily dose=120 units (Patient taking differently: Inject 23 Units into the skin 3 (three) times daily with meals. 23 units with meals plus correction scale. Max daily dose=120 units)    lancets (ACCU-CHEK FASTCLIX LANCING DEV) Misc Lancets and device = check blood sugar 4 times daily    linaGLIPtin (TRADJENTA) 5 mg Tab tablet Take 1 tablet (5 mg total) by mouth once daily.    loratadine (CLARITIN) 10 mg tablet Take 1 tablet (10 mg total) by mouth once daily.    oxyCODONE-acetaminophen (PERCOCET)  mg per tablet Take one tablet by mouth 5 times daily    [START ON 10/8/2020] oxyCODONE-acetaminophen (PERCOCET)  mg per tablet Take 1 tablet by mouth 5 (five) times daily.    " "pantoprazole (PROTONIX) 40 MG tablet TAKE 1 TABLET(40 MG) BY MOUTH twice daily    pen needle, diabetic (SURE-FINE PEN NEEDLES) 29 gauge x 1/2" Ndle Use 4 times daily    rOPINIRole (REQUIP) 0.5 MG tablet TAKE 1 TABLET (0.5 MG TOTAL) BY MOUTH EVERY EVENING.    TRUEPLUS LANCETS 33 gauge Misc TEST BLOOD SUGAR FOUR TIMES DAILY    azelastine (ASTELIN) 137 mcg (0.1 %) nasal spray Spray 1 spray (137 mcg total) by Nasal route 2 (two) times daily.    carbamide peroxide (EAR WAX REMOVAL DROPS OTIC)     meclizine (ANTIVERT) 12.5 mg tablet Take 1 tablet (12.5 mg total) by mouth 3 (three) times daily as needed for Dizziness. (Patient not taking: Reported on 9/11/2020)    olopatadine (PATADAY) 0.2 % Drop Place 1 drop into both eyes once daily.    triamcinolone acetonide 0.1% (KENALOG) 0.1 % cream Apply topically 3 (three) times daily.     No current facility-administered medications for this visit.      Facility-Administered Medications Ordered in Other Visits   Medication    tetracaine HCl (PF) 0.5 % Drop 1 drop       REVIEW OF SYSTEMS:    GENERAL:  No weight loss, malaise or fevers.  HEENT:  Negative for frequent or significant headaches.  NECK:  Negative for lumps, goiter, pain and significant neck swelling.  RESPIRATORY:  Negative for cough, wheezing or shortness of breath. MARLON.  CARDIOVASCULAR:  Negative for chest pain, leg swelling or palpitations.  CAD and CHF.  GI:  Negative for abdominal discomfort, blood in stools or black stools or change in bowel habits. GERD.  MUSCULOSKELETAL:  Lower back and joint pain  SKIN:  Negative for lesions, rash, and itching.  PSYCH:  Negative for sleep disturbance, mood disorder and recent psychosocial stressors.  HEMATOLOGY/LYMPHOLOGY:  Negative for prolonged bleeding, bruising easily or swollen nodes. Anemia.  NEURO:   No history of headaches, syncope, paralysis, seizures or tremors.  All other reviewed and negative other than HPI.    OBJECTIVE:    /83   Pulse 75   Temp " "97.4 °F (36.3 °C)   Ht 5' 8" (1.727 m)   Wt 102.9 kg (226 lb 13.7 oz)   LMP  (LMP Unknown)   BMI 34.49 kg/m²     PHYSICAL EXAMINATION:    General appearance: Well appearing, in no acute distress, alert and oriented x3.  Psych:  Mood and affect appropriate.  Skin: Skin color, texture, turgor normal, no rashes or lesions, in both upper and lower body.  Head/face:  Normocephalic, atraumatic. No palpable lymph nodes.  Neck: There is pain to palpation over cervical paraspinals bilaterally. Spurling Negative bilaterally. Limited ROM with pain on flexion, extension, and lateral rotation.  Cor: RRR  Pulm: CTA  GI:  Soft and non-tender.  Back: Straight leg raise in the sitting position is negative for radicular pain bilaterally. Limited ROM with pain on flexion and extension. Positive facet loading bilaterally.   Extremities: Peripheral joint ROM is full and pain free without obvious instability or laxity in all four extremities. No deformities, edema, or skin discoloration. Good capillary refill.  Musculoskeletal: Bilateral upper and lower extremity strength is normal and symmetric.  No atrophy or tone abnormalities are noted.  Neuro: Bilateral upper and lower extremity coordination and muscle stretch reflexes are physiologic and symmetric.  Plantar response are downgoing. + decreased sensation to C8 distribution to the right. (-) Nichols   Gait: Antalgic- aided with cane.    ASSESSMENT: 80 y.o. year old female with chronic lower back pain and leg pain consistent with the following diagnoses:    1. Lumbar radiculopathy     2. Lumbar spondylosis     3. Spinal stenosis of lumbar region with neurogenic claudication     4. DDD (degenerative disc disease), lumbar     5. Myofascial pain     6. Cervical spondylosis     7. DDD (degenerative disc disease), cervical     8. Chronic pain disorder           PLAN:     - Previous imaging was reviewed and discussed with the patient today.    - Schedule for bilateral L4/5 TF JANEE.     - " If her neck pain worsens, we can proceed with C4,5,6 RFA one side at a time. She had significant relief with MBB.    - I have stressed the importance of physical activity and a home exercise plan to help with pain and improve health. Lumbar exercises provided today.     - Continue Robaxin 500 mg TID.     - RTC in 2 weeks after above procedure.     - Counseled patient regarding the importance of physical therapy.    - Dr. Ravi was consulted on the patient and agrees with this plan.    The above plan and management options were discussed at length with patient. Patient is in agreement with the above and verbalized [email or web address]     Clover Kirby  09/18/2020

## 2020-09-18 NOTE — PROGRESS NOTES
Chronic Pain - Established Visit    Referring Physician: No ref. provider found    Chief Complaint:   Chief Complaint   Patient presents with    Low-back Pain    Neck Pain        SUBJECTIVE:    Interval History 9/18/2020:  The patient returns to clinic today for follow up of neck and low back pain. She was scheduled for lumbar JANEE but this was canceled. She continues to report low back pain that radiates down the lateral aspect of both legs to her ankles. She reports increased pain with prolonged activity. She reports increased neck pain that is constant, sharp, and aching. She denies any radiating arm pain. Her pain is worse with turning her head to the side. She reports difficulty sleeping due to pain. She denies any other health changes. Her pain today is 8/10.    Interval History 7/9/2020:  The patient returns to clinic today for follow up of neck pain. She is s/p bilateral C4,5,6 MBB on 6/24/2020. She reports 90% relief of her neck pain. She continues to report benefit. She reports intermittent neck pain that is tolerable at this time. She denies any radiating arm pain. She does report increased low back pain that intermittently radiates down the posterolateral aspect of her legs to her ankles bilaterally. This pain is worse with activity. She denies any other health changes. Her pain today is 8/10.    Interval History 6/15/2020:  The patient presents for follow up of severe Left>R sided neck pain with radiation into shoulder and states pain does not go below shoulders. Pt was scheduled for B C4,5,6 MBB but secondary to covid she had to reschedule. The patient denies myelopathic symptoms such as handwriting changes or difficulty with buttons/coins/keys. Denies perineal paresthesias, bowel/bladder dysfunction. She states her CBGs are in low 100s at this time. She is taking Robaxin PRN.     Interval History 4/3/2020:  Follow up is for neck pain. She was scheduled for C4, C5, C6 MBB on 3/20/20 but this had to be  canceled for now due to the covid epidemic. At last visit her tizanidine prescription was changed to robaxan. She says her pain is tolerable with robaxan and Biofreeze which she purchased over-the-counter.  Denies any numbness, weakness, bowel/bladder changes.     Interval History 3/4/2020:  The patient returns to clinic today for follow up of neck pain. She reports that trigger point injections at last visit provided significant relief until the last week. She reports neck pain that is sharp and aching in nature. She denies any radiating arm pain. Her pain is worse with turning her head to the side. She reports difficulty doing housework due to pain. She is currently taking Zanaflex with limited relief. She does take Percocet per her PCP with benefit. She denies any other health changes. Her pain today is 9/10.        Interval History 1/30/2020:  The patient is here to discuss new onset neck pain.  It is mainly located over the left neck and shoulder area.  She denies radiation past the shoulder.  She denies numbness and tingling.  She denies weakness.  She had a recent ED visit for dizziness.  She had CT scans to rule out clot.  She would like an injection today.  She denies bowel or bladder changes.  Her pain today is 10/10.    Interval History 12/3/2019:  The patient is here for follow up of back pain.  Her pain is radiating down the sides of both legs with numbness.  Her BP remains elevated today.  She is followed by her PCP for this.  She denies SOB or chest pain today.  She thinks that it is always high when she comes to [place] due to walking far from the garage.  She did have benefit with previous bilateral L4-5 TF JANEE.  Her pain today is 4/10.  The patient denies any bowel or bladder incontinence or signs of saddle paresthesia.  The patient denies any major medical changes since last office visit.    Interval History 10/1/2019:  The patient is here for follow up today of back and leg pain.  She is s/p  bilateral L4-5 TF JNAEE with 50% relief.  She says that her pain is more tolerable.  She has more pain with walking, such as walking to our office today.  She has persistent burning to both feet.  She has a known history of diabetes.  Her pain is worse at night and she describes it as burning.  She has intermittent radiation down the sides of both legs.  Her back pain is greater than her leg pain.  She takes Percocet from her PCP with benefit.  She would like to repeat Healthy Back.  She feels as though this was helpful and would like to restart.  Her pain today is 7/10.  Her blood pressure is elevated today.  She says that she has not taken her medication today because she was rushing to get to her appointment.  She denies headache, SOB, or chest pain.     Initial Encounter:  Henrietta Villasenor presents to the clinic for the evaluation of low back pain. The pain started and symptoms have been worsening.The pain is located in the low back area and radiates to the bilateral leg swelling.  The pain is described as sharp, shooting, throbbing, tingling and constant and is rated as 5/10. The pain is rated with a score of  5/10 on the BEST day and a score of 8/10 on the WORST day.  Symptoms interfere with daily activity and sleeping. The pain is exacerbated by Standing, Bending, Coughing/Sneezing, Walking, Morning, Lifting and Getting out of bed/chair.  The pain is mitigated by medications. She reports spending 0 hours per day reclining. The patient reports 2 hours of uninterrupted sleep per night.    Patient denies night fever/night sweats, urinary incontinence, bowel incontinence, significant weight loss, significant motor weakness and loss of sensations.    Physical Therapy/Home Exercise: yes      Pain Disability Index Review:  Last 3 PDI Scores 9/18/2020 7/9/2020 6/15/2020   Pain Disability Index (PDI) 49 60 54       Pain Medications:    - Opioids: Percocet (Oxycodone/Acetaminophen)  - Adjuvant Medications: Cymbalta (  Duloxetine) and Tizanidine  - Anti-Coagulants: None  - Others: None     report:  Reviewed and consistent with medication use as prescribed.    Pain Procedures:   9/17/19 Bilateral L4-5 TF JANEE- 50% relief  6/24/2020- Bilateral C4,5,6 MBB-90% relief    Imaging:  CT Cervical Spine 1/2/2020:    FINDINGS:  No evidence of acute cervical spine fracture or dislocation.  Odontoid process is intact.  Craniocervical junction is unremarkable.  Cervical spine alignment is within normal limits.  Prominent multilevel degenerative changes are seen with DISH.    Surrounding soft tissues show no significant abnormalities.  Airway is patent.      Impression       No evidence of acute cervical spine fracture or dislocation.         6/12/18 MRI Lumbar  Narrative     EXAMINATION:  MRI LUMBAR SPINE WITHOUT CONTRAST    CLINICAL HISTORY:  low back and right leg pain; Other intervertebral disc degeneration, lumbar region    TECHNIQUE:  Multiplanar, multisequence MR images were acquired from the thoracolumbar junction to the sacrum without the administration of contrast.    COMPARISON:  Radiograph 06/05/2018    FINDINGS:  Alignment: Normal.    Vertebrae: Normal marrow signal. No fracture.    Discs: Normal height and signal.    Cord: Normal.  Conus terminates at superior border of L1    Degenerative findings:    T12-L1: Mild broad-based disc bulge without compressive sequelae.    L1-L2: Unremarkable.    L2-L3: Mild broad-based disc bulge with facet arthropathy.  No compressive sequelae.    L3-L4: Broad-based disc bulge and facet arthropathy.  There is mild bilateral neural foraminal narrowing.    L4-L5: Trace anterolisthesis of L4 on L5 with broad-based disc bulge and facet arthropathy.  This results in moderate bilateral neural foraminal narrowing.  Moderate canal stenosis.  There is also significant narrowing of the left greater than right lateral recesses.    L5-S1: Broad-based disc bulge without significant compressive  sequelae.    Paraspinal muscles & soft tissues: Paraspinal atrophy.  No concerning findings in the visualized abdomen.  Add      Impression       Moderate degenerative change at L4-5.  Milder degenerative changes elsewhere in the lumbar spine.     6/5/18 Xray Lumbar  Narrative     EXAMINATION:  XR LUMBAR SPINE 5 VIEW WITH FLEX AND EXT    CLINICAL HISTORY:  low back and right leg pain;  Other intervertebral disc degeneration, lumbar region    TECHNIQUE:  Five views of the lumbar spine plus flexion extension views were performed.    COMPARISON:  02/03/2015    FINDINGS:  The lumbar vertebra are intact.  No compression fracture is identified.  Degenerative changes are again seen with small osteophytes at multiple levels.  L4-5 disc space is mildly narrowed.  Degenerative changes are also noted at lower facet joints.  There is associated grade 1 spondylolisthesis at L4-5 which is stable between flexion and extension at about 6 mm.  Alignment is otherwise satisfactory.    Visualized abdomen shows aortic atherosclerosis and surgical clips in the right upper quadrant.      Impression       No acute abnormality.  Chronic degenerative spine changes.           Past Medical History:   Diagnosis Date    Abnormality of lung 11/08/2011    Stable bandlike opacities at the lung bases, most likely representing      Anxiety     Arthritis     CAD (coronary artery disease)     Calculus of ureter 2/22/2011    CHF (congestive heart failure)     Chronic back pain 7/29/2012    Colon polyp 9/2010; 11/2010    Colon polyps 7/29/2012    Coronary artery disease involving native coronary artery of native heart with angina pectoris     Depression     Diabetes mellitus     Diabetes mellitus type II     Diverticulitis large intestine 7/27/2015    Diverticulitis of large intestine without perforation or abscess without bleeding     Diverticulosis 09/25/2010; 11/02/2010; 11/08/2011; 7/29/2012    Duodenal disorder 08/25/2011     Duodenal erosion noted on EGD.    Duodenal ulcer, unspecified as acute or chronic, without hemorrhage, perforation, or obstruction 8/24/2011    E. coli sepsis 12/2010    Due to left ureteral stone with left nephrostomy tube - hospitalized in [place]    Esophageal dysmotility 01/24/2012    Noted on upper GI-barium swallow.    Facial weakness 1969    Left facial weakness s/p left mastoidectomy in ~ 1969.    Fatty liver 11/08/2011    Reported on CT-abdomen and in 06/2012 Gastro clinic visit note.    Gastric polyp 09/29/2010    GERD (gastroesophageal reflux disease) 7/29/2012    Hepatomegaly 11/08/2011    Reported on CT-abdomen    Herpes zoster with other nervous system complications(053.19) 2/28/2011    Hiatal hernia 06/26/2006; 09/29/2010; 08/25/2011    Noted on barium swallow 2006; noted on  EGD 2011.   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    Trouble in sleeping     Type II or unspecified type diabetes mellitus with neurological manifestations, not stated as uncontrolled(250.60)     Type II or unspecified type diabetes mellitus with peripheral circulatory disorders, not stated as uncontrolled(250.70)     Type II or unspecified type diabetes mellitus with renal manifestations, not stated as  uncontrolled(250.40)      Past Surgical History:   Procedure Laterality Date    BELPHAROPTOSIS REPAIR      s/p LAMBERTO levator repair - Dr. Dejesus    CARDIAC CATHETERIZATION      CARPAL TUNNEL RELEASE  3/13/2012    CATARACT EXTRACTION W/  INTRAOCULAR LENS IMPLANT Right 10/31/2018        CATARACT EXTRACTION W/  INTRAOCULAR LENS IMPLANT Left 03/22/2018        CHOLECYSTECTOMY      COLONOSCOPY N/A 11/16/2016    Procedure: COLONOSCOPY;  Surgeon: Chris Storey MD;  Location: [facility] ENDO (4TH FLR);  Service: Endoscopy;  Laterality: N/A;    COLONOSCOPY N/A 6/14/2017    Procedure: COLONOSCOPY;  Surgeon: Chris Storey MD;  Location: [facility] ENDO (4TH FLR);  Service: Endoscopy;  Laterality: N/A;  colonoscopy in 3 months with better bowel prep. - Split PEG prep ordered    EXTRACORPOREAL SHOCK WAVE LITHOTRIPSY      INJECTION OF ANESTHETIC AGENT AROUND NERVE Bilateral 6/24/2020    Procedure: BLOCK, NERVE, C4-C5-C6 MEDIAL BRANCH ok per [place] to add on;  Surgeon: Oscar Ravi MD;  Location: [facility] PAIN MGT;  Service: Pain Management;  Laterality: Bilateral;    INTRAOCULAR PROSTHESES INSERTION Right 10/31/2018    Procedure: INSERTION-INTRAOCULAR LENS (IOL);  Surgeon: Keysha Valle MD;  Location: [facility] OR;  Service: Ophthalmology;  Laterality: Right;    JOINT REPLACEMENT      mastoid tumor removal  1969    Left mastoidectomy with residual left facial weakness.    NEPHROSTOMY      OOPHORECTOMY      PHACOEMULSIFICATION OF CATARACT Right 10/31/2018    Procedure: PHACOEMULSIFICATION-ASPIRATION-CATARACT;  Surgeon: Keysha Valle MD;  Location: [facility] OR;  Service: Ophthalmology;  Laterality: Right;    TOTAL ABDOMINAL HYSTERECTOMY  1969    TAHBSO    TOTAL KNEE ARTHROPLASTY  9/13/2012    Left    TRANSFORAMINAL EPIDURAL INJECTION OF STEROID Bilateral 9/17/2019    Procedure: INJECTION, STEROID, EPIDURAL, TRANSFORAMINAL APPROACH;  Surgeon: Oscar Ravi MD;  Location: [facility] PAIN MGT;  Service: Pain  Management;  Laterality: Bilateral;  B/L TFESI L4/L5     Social History     Socioeconomic History    Marital status: Single     Spouse name: Not on file    Number of children: 5    Years of education: Not on file    Highest education level: Not on file   Occupational History    Not on file   Social Needs    Financial resource strain: Somewhat hard    Food insecurity     Worry: Sometimes true     Inability: Often true    Transportation needs     Medical: No     Non-medical: No   Tobacco Use    Smoking status: Former Smoker     Packs/day: 1.00     Years: 15.00     Pack years: 15.00     Types: Cigarettes     Quit date: 2000     Years since quittin.1    Smokeless tobacco: Never Used   Substance and Sexual Activity    Alcohol use: No    Drug use: No    Sexual activity: Never   Lifestyle    Physical activity     Days per week: 0 days     Minutes per session: 0 min    Stress: Very much   Relationships    Social connections     Talks on phone: More than three times a week     Gets together: Once a week     Attends Christian service: More than 4 times per year     Active member of club or organization: Yes     Attends meetings of clubs or organizations: 1 to 4 times per year     Relationship status: Never    Other Topics Concern    Are you pregnant or think you may be? Not Asked    Breast-feeding Not Asked   Social History Narrative    Single with 5 children. Lives alone. Retired cook.     Family History   Problem Relation Age of Onset    Sleep apnea Sister     Diabetes Sister     Cancer Mother         brain tumor    Hypertension Mother     Heart disease Father     Heart attack Father     Dementia Brother     Lupus Brother     Frontotemporal dementia Brother     No Known Problems Daughter     No Known Problems Son     Diabetes Sister     Lupus Sister     Breast cancer Sister     Diabetes Sister     Cancer Sister         breast cancer    Heart attack Sister     Diabetes  Sister     Liver cancer Brother     Drug abuse Brother     Alcohol abuse Brother     Cirrhosis Brother     Drug abuse Brother     No Known Problems Daughter     No Known Problems Son     No Known Problems Son     No Known Problems Maternal Grandmother     No Known Problems Maternal Grandfather     No Known Problems Paternal Grandmother     No Known Problems Paternal Grandfather     Diabetes Brother     Dementia Brother     Diabetes Other     Ovarian cancer Other         Niece     Breast cancer Maternal Aunt     No Known Problems Maternal Uncle     No Known Problems Paternal Aunt     No Known Problems Paternal Uncle     Glaucoma Neg Hx     Blindness Neg Hx     Celiac disease Neg Hx     Colon cancer Neg Hx     Colon polyps Neg Hx     Esophageal cancer Neg Hx     Inflammatory bowel disease Neg Hx     Liver disease Neg Hx     Rectal cancer Neg Hx     Stomach cancer Neg Hx     Ulcerative colitis Neg Hx     Melanoma Neg Hx     Multiple sclerosis Neg Hx     Psoriasis Neg Hx     Amblyopia Neg Hx     Cataracts Neg Hx     Macular degeneration Neg Hx     Retinal detachment Neg Hx     Strabismus Neg Hx     Stroke Neg Hx     Thyroid disease Neg Hx        Review of patient's allergies indicates:   Allergen Reactions    Crestor [rosuvastatin]      Cramping in legs    Ezetimibe Diarrhea     Other reaction(s): abdominal pain, Diarrhea      Hydrocodone Itching    Lisinopril      Other reaction(s): cough      Sulfa (sulfonamide antibiotics) Itching and Rash           Sulfamethoxazole     Sulfamethoxazole-trimethoprim     Valsartan Swelling       Current Outpatient Medications   Medication Sig    ACCU-CHEK SMARTVIEW TEST STRIP Strp TEST THREE TIMES DAILY    albuterol (VENTOLIN HFA) 90 mcg/actuation inhaler Inhale 2 puffs by mouth into the lungs every 4 (four) hours as needed for Wheezing. Rescue    allopurinol (ZYLOPRIM) 100 MG tablet Take 1 tablet (100 mg total) by mouth once daily.  "   amLODIPine (NORVASC) 5 MG tablet Take 1 tablet (5 mg total) by mouth once daily.    aspirin (ECOTRIN) 81 MG EC tablet Take 1 tablet by mouth Daily.    atorvastatin (LIPITOR) 40 MG tablet Take 1 tablet (40 mg total) by mouth once daily.    BD ALCOHOL SWABS PadM USE  WHEN  TESTING BLOOD SUGAR FOUR TIMES DAILY    BD ULTRA-FINE OLU PEN NEEDLES 32 x 5/32 " Ndle Uses 4 times daily, on multiple daily insulin injections    blood sugar diagnostic Strp One Touch verio test strips - check blood sugar three times daily    blood-glucose meter kit Use as instructed    carvediloL (COREG) 25 MG tablet Take 1 tablet (25 mg total) by mouth 2 (two) times daily.    cholecalciferol, vitamin D3, (DIALYVITE VITAMIN D) 5,000 unit capsule Take 5,000 Units by mouth once daily.    DULoxetine (CYMBALTA) 60 MG capsule Take 1 capsule (60 mg total) by mouth 2 (two) times daily.    fluticasone (FLONASE) 50 mcg/actuation nasal spray SHAKE LQ AND U 2 SPRAYS IEN QD    furosemide (LASIX) 40 MG tablet TAKE 1 TABLET BY MOUTH TWICE DAILY    insulin (LANTUS SOLOSTAR U-100 INSULIN) glargine 100 units/mL (3mL) SubQ pen INJECT  55-60 UNITS SUBCUTANEOUSLY  AT  NIGHT    insulin aspart (NOVOLOG) 100 unit/mL InPn pen 23 units with meals plus correction scale. Max daily dose=120 units (Patient taking differently: Inject 23 Units into the skin 3 (three) times daily with meals. 23 units with meals plus correction scale. Max daily dose=120 units)    lancets (ACCU-CHEK FASTCLIX LANCING DEV) Misc Lancets and device = check blood sugar 4 times daily    linaGLIPtin (TRADJENTA) 5 mg Tab tablet Take 1 tablet (5 mg total) by mouth once daily.    loratadine (CLARITIN) 10 mg tablet Take 1 tablet (10 mg total) by mouth once daily.    oxyCODONE-acetaminophen (PERCOCET)  mg per tablet Take one tablet by mouth 5 times daily    [START ON 10/8/2020] oxyCODONE-acetaminophen (PERCOCET)  mg per tablet Take 1 tablet by mouth 5 (five) times daily.    " "pantoprazole (PROTONIX) 40 MG tablet TAKE 1 TABLET(40 MG) BY MOUTH twice daily    pen needle, diabetic (SURE-FINE PEN NEEDLES) 29 gauge x 1/2" Ndle Use 4 times daily    rOPINIRole (REQUIP) 0.5 MG tablet TAKE 1 TABLET (0.5 MG TOTAL) BY MOUTH EVERY EVENING.    TRUEPLUS LANCETS 33 gauge Misc TEST BLOOD SUGAR FOUR TIMES DAILY    azelastine (ASTELIN) 137 mcg (0.1 %) nasal spray Spray 1 spray (137 mcg total) by Nasal route 2 (two) times daily.    carbamide peroxide (EAR WAX REMOVAL DROPS OTIC)     meclizine (ANTIVERT) 12.5 mg tablet Take 1 tablet (12.5 mg total) by mouth 3 (three) times daily as needed for Dizziness. (Patient not taking: Reported on 9/11/2020)    olopatadine (PATADAY) 0.2 % Drop Place 1 drop into both eyes once daily.    triamcinolone acetonide 0.1% (KENALOG) 0.1 % cream Apply topically 3 (three) times daily.     No current facility-administered medications for this visit.      Facility-Administered Medications Ordered in Other Visits   Medication    tetracaine HCl (PF) 0.5 % Drop 1 drop       REVIEW OF SYSTEMS:    GENERAL:  No weight loss, malaise or fevers.  HEENT:  Negative for frequent or significant headaches.  NECK:  Negative for lumps, goiter, pain and significant neck swelling.  RESPIRATORY:  Negative for cough, wheezing or shortness of breath. MARLON.  CARDIOVASCULAR:  Negative for chest pain, leg swelling or palpitations.  CAD and CHF.  GI:  Negative for abdominal discomfort, blood in stools or black stools or change in bowel habits. GERD.  MUSCULOSKELETAL:  Lower back and joint pain  SKIN:  Negative for lesions, rash, and itching.  PSYCH:  Negative for sleep disturbance, mood disorder and recent psychosocial stressors.  HEMATOLOGY/LYMPHOLOGY:  Negative for prolonged bleeding, bruising easily or swollen nodes. Anemia.  NEURO:   No history of headaches, syncope, paralysis, seizures or tremors.  All other reviewed and negative other than HPI.    OBJECTIVE:    /83   Pulse 75   Temp " "97.4 °F (36.3 °C)   Ht 5' 8" (1.727 m)   Wt 102.9 kg (226 lb 13.7 oz)   LMP  (LMP Unknown)   BMI 34.49 kg/m²     PHYSICAL EXAMINATION:    General appearance: Well appearing, in no acute distress, alert and oriented x3.  Psych:  Mood and affect appropriate.  Skin: Skin color, texture, turgor normal, no rashes or lesions, in both upper and lower body.  Head/face:  Normocephalic, atraumatic. No palpable lymph nodes.  Neck: There is pain to palpation over cervical paraspinals bilaterally. Spurling Negative bilaterally. Limited ROM with pain on flexion, extension, and lateral rotation.  Cor: RRR  Pulm: CTA  GI:  Soft and non-tender.  Back: Straight leg raise in the sitting position is negative for radicular pain bilaterally. Limited ROM with pain on flexion and extension. Positive facet loading bilaterally.   Extremities: Peripheral joint ROM is full and pain free without obvious instability or laxity in all four extremities. No deformities, edema, or skin discoloration. Good capillary refill.  Musculoskeletal: Bilateral upper and lower extremity strength is normal and symmetric.  No atrophy or tone abnormalities are noted.  Neuro: Bilateral upper and lower extremity coordination and muscle stretch reflexes are physiologic and symmetric.  Plantar response are downgoing. + decreased sensation to C8 distribution to the right. (-) [place]   Gait: Antalgic- aided with cane.    ASSESSMENT: 80 y.o. year old female with chronic lower back pain and leg pain consistent with the following diagnoses:    1. Lumbar radiculopathy     2. Lumbar spondylosis     3. Spinal stenosis of lumbar region with neurogenic claudication     4. DDD (degenerative disc disease), lumbar     5. Myofascial pain     6. Cervical spondylosis     7. DDD (degenerative disc disease), cervical     8. Chronic pain disorder           PLAN:     - Previous imaging was reviewed and discussed with the patient today.    - Schedule for bilateral L4/5 TF JANEE.     - " If her neck pain worsens, we can proceed with C4,5,6 RFA one side at a time. She had significant relief with MBB.    - I have stressed the importance of physical activity and a home exercise plan to help with pain and improve health. Lumbar exercises provided today.     - Continue Robaxin 500 mg TID.     - RTC in 2 weeks after above procedure.     - Counseled patient regarding the importance of physical therapy.    - Dr. Ravi was consulted on the patient and agrees with this plan.    The above plan and management options were discussed at length with patient. Patient is in agreement with the above and verbalized understanding.     Clover Kirby  09/18/2020

## 2020-09-22 NOTE — PROGRESS NOTES
Subjective:      Patient ID: Henrietta Villasenor is a 80 y.o. female.    Chief Complaint:  Follow-up    History of Present Illness  Henrietta Villasenor is here for follow up of T2DM, hyperpara, vit D def, osteopenia, thyroid nodule.  Previously seen by me 7/28/2020.    With regards to diabetes:    Diagnosed:  Late 1990s  Known complications:  DKA -  RN +  PN +  Nephropathy +  CAD +    Current regimen:  Lantus 60units nightly  Novolog 24units TIDAC   Tradjenta 5mg daily    Other medications tried:  MFM - CKD    Glucose Monitor:   4 times a day testing  Log reviewed:             Patient has diabetes mellitus and manages diabetes with an intensive insulin regimen. The patient requires a therapeutic CGM and is willing to use therapeutic CGM for the necesary frequent adjustments of insulin therapy. Patient has been using SMBG for frequent glucose monitoring (4+ times daily). I have completed an in-person visit during the previous 6 months and will continue to have in-person visits every 6 months to assess adherence to their CGM regimen and diabetes treatment plan.    Hypoglycemia:  None.  Knows how to correct with 15 grams of carbs- juice, coke, or a peppermint.     Diet/Exercise:  Eats 2-3 meals a day.   B: coffee, oatmeal, toast   L: banana, bread, diet coke  D: meat/ red beans, rice  Snacks : granola bar  Drinks : water and diet coke.   Exercise: None.       Education - last visit: 2018  Eye Exam: 1/2020  Podiatry: 6/2020    Denies history of pancreatitis & personal/family history of medullary thyroid cancer.     Diabetes Management Status    Hemoglobin A1C   Date Value Ref Range Status   06/19/2020 10.8 (H) 4.0 - 5.6 % Final     Comment:     ADA Screening Guidelines:  5.7-6.4%  Consistent with prediabetes  >or=6.5%  Consistent with diabetes  High levels of fetal hemoglobin interfere with the HbA1C  assay. Heterozygous hemoglobin variants (HbS, HgC, etc)do  not significantly interfere with this assay.   However, presence of  multiple variants may affect accuracy.     03/11/2020 10.5 (H) 4.0 - 5.6 % Final     Comment:     ADA Screening Guidelines:  5.7-6.4%  Consistent with prediabetes  >or=6.5%  Consistent with diabetes  High levels of fetal hemoglobin interfere with the HbA1C  assay. Heterozygous hemoglobin variants (HbS, HgC, etc)do  not significantly interfere with this assay.   However, presence of multiple variants may affect accuracy.     11/18/2019 9.0 (H) 4.0 - 5.6 % Final     Comment:     ADA Screening Guidelines:  5.7-6.4%  Consistent with prediabetes  >or=6.5%  Consistent with diabetes  High levels of fetal hemoglobin interfere with the HbA1C  assay. Heterozygous hemoglobin variants (HbS, HgC, etc)do  not significantly interfere with this assay.   However, presence of multiple variants may affect accuracy.         Statin: Taking  ACE/ARB: Not taking  Screening or Prevention Patient's value Goal Complete/Controlled?   HgA1C Testing and Control   Lab Results   Component Value Date    HGBA1C 10.8 (H) 06/19/2020      Annually/Less than 8% No   Lipid profile : 02/20/2020 Annually Yes   LDL control Lab Results   Component Value Date    LDLCALC 95.0 02/20/2020    Annually/Less than 100 mg/dl  Yes   Nephropathy screening Lab Results   Component Value Date    LABMICR 41.0 05/26/2017     Lab Results   Component Value Date    PROTEINUA Negative 01/02/2020    Annually Yes   Blood pressure BP Readings from Last 1 Encounters:   09/23/20 128/74    Less than 140/90 No   Dilated retinal exam : 01/27/2020 Annually Yes   Foot exam   : 06/09/2020 Annually No     With regards to the hyperparathyroidism:    Secondary to renal.     Lab Results   Component Value Date    .0 (H) 08/08/2017    CALCIUM 9.1 06/19/2020    PHOS 3.9 01/19/2018     Lab Results   Component Value Date    ALBUMIN 3.4 (L) 06/19/2020     With regards to Vitamin D Deficiency:    Vit D, 25-Hydroxy   Date Value Ref Range Status   06/19/2020 64 30 - 96 ng/mL Final     Comment:      Vitamin D deficiency.........<10 ng/mL                              Vitamin D insufficiency......10-29 ng/mL       Vitamin D sufficiency........> or equal to 30 ng/mL  Vitamin D toxicity............>100 ng/mL       Current Meds: OTC VIt D3 1000iu daily    With regards to osteopenia:    BMD:   7/9/2020  The L1 to L4 vertebral bone mineral density is equal to 1.215 g/cm squared with a T score of 0.2.  There has been a -3.2% statistically significant change relative to the prior study.  The left femoral neck bone mineral density is equal to 0.861 g/cm squared with a T score of -1.3.  The total hip bone mineral density is equal to 0.928 g/cm squared with a T score of -0.6.  There is a 5.4% risk of a major osteoporotic fracture and a 1.1% risk of hip fracture in the next 10 years (FRAX).  Impression:  Osteopenia    Fractures : denies    Calcium : None  Vitamin D : OTC Vit D3 1000iu daily     With regards to thyroid nodule:    Thyroid US:   7/9/2020  Right lobe:  Echogenicity: Homogeneous  Measures: 4 cm x 1.4 cm x 1.6 cm.  Lymph nodes: No lymph nodes identified at this time.  Left lobe:  Echogenicity: Homogeneous  Measures: 3.7 cm x 1.1 cm x 1.4 cm.  Lymph nodes: No lymph nodes are identified at this time.  Multiple bilateral thyroid nodules are present, with 3 index nodules as detailed below.  Nodule #1  Size:  cm  Location: Right lobe upper pole  Composition: solid/almost completely solid (2)  Echogenicity: very hypoechoic (3)  Shape: wider-than-tall (0)  Margins: smooth/ill-defined (0)  Echogenic foci: none (0)  Change: No  TI-RADS category: TR4 (5 points) - follow up is recommended at 1, 2, 3, and 5 years if 1 cm or greater; FNA is recommended if 1.5 cm or greater.  Nodule #2  Size: 1.5 cm x 7 mm x 1.2 cm  Location: Isthmus  Composition: solid/almost completely solid (2)  Echogenicity: very hypoechoic (3)  Shape: wider-than-tall (0)  Margins: smooth/ill-defined (0)  Echogenic foci: none (0)  Change: No  TI-RADS  "category: TR4 (5 points) - follow up is recommended at 1, 2, 3, and 5 years if 1 cm or greater; FNA is recommended if 1.5 cm or greater.  Nodule #3  Size: 0.8 cm x 0.5 cm x 0.5 cm  Location: Right lobe upper pole  Composition: cystic/almost completely cystic (0)   Echogenicity: anechoic (0)  Shape: wider-than-tall (0)  Margins: smooth (0)  Echogenic foci: none (0)  Change: No  TI-RADS category: TR1 (0 points) - no follow up required   Impression:  The thyroid gland is stable in appearance for roughly 9 years.  For this reason, no further follow-up is recommended for these nodules.    FNA: None    Signs or Symptoms:   Difficulty breathing: Denies  Difficulty swallowing: Luis E  Voice Changes: Denies  FH of thyroid cancer: Denies  Personal history of radiation treatment or exposure: Denies    Lab Results   Component Value Date    TSH 2.676 06/19/2020    FREET4 0.82 04/01/2009     Denies signs or symptoms of hyper or hypothyroidism    Review of Systems   Constitutional: Negative for fatigue.   Eyes: Negative for visual disturbance.   Respiratory: Negative for shortness of breath.    Cardiovascular: Negative for chest pain.   Gastrointestinal: Negative for abdominal pain.   Musculoskeletal: Positive for back pain. Negative for arthralgias.   Skin: Negative for wound.   Neurological: Negative for headaches.   Hematological: Does not bruise/bleed easily.   Psychiatric/Behavioral: Negative for sleep disturbance.     Objective:   Physical Exam  Vitals signs reviewed.   Neck:      Thyroid: No thyromegaly (irregular shaped gland).   Cardiovascular:      Rate and Rhythm: Normal rate.      Comments: No edema present  Pulmonary:      Effort: Pulmonary effort is normal.   Abdominal:      Palpations: Abdomen is soft.       Visit Vitals  /74   Pulse 70   Ht 5' 8" (1.727 m)   Wt 102 kg (224 lb 13.9 oz)   LMP  (LMP Unknown)   BMI 34.19 kg/m²     Injecting into scar tissue.    Body mass index is 34.19 kg/m².    Lab Review:   Lab " Results   Component Value Date    HGBA1C 10.8 (H) 06/19/2020    HGBA1C 10.5 (H) 03/11/2020    HGBA1C 9.0 (H) 11/18/2019       Lab Results   Component Value Date    CHOL 175 02/20/2020    HDL 52 02/20/2020    LDLCALC 95.0 02/20/2020    TRIG 140 02/20/2020    CHOLHDL 29.7 02/20/2020     Lab Results   Component Value Date     06/19/2020    K 3.6 06/19/2020     06/19/2020    CO2 30 (H) 06/19/2020     (H) 06/19/2020    BUN 20 06/19/2020    CREATININE 1.4 06/19/2020    CALCIUM 9.1 06/19/2020    PROT 7.7 06/19/2020    ALBUMIN 3.4 (L) 06/19/2020    BILITOT 0.3 06/19/2020    ALKPHOS 87 06/19/2020    AST 26 06/19/2020    ALT 19 06/19/2020    ANIONGAP 11 06/19/2020    ESTGFRAFRICA 40.9 (A) 06/19/2020    EGFRNONAA 35.5 (A) 06/19/2020    TSH 2.676 06/19/2020     Vit D, 25-Hydroxy   Date Value Ref Range Status   06/19/2020 64 30 - 96 ng/mL Final     Comment:     Vitamin D deficiency.........<10 ng/mL                              Vitamin D insufficiency......10-29 ng/mL       Vitamin D sufficiency........> or equal to 30 ng/mL  Vitamin D toxicity............>100 ng/mL       Assessment and Plan     1. Type 2 diabetes mellitus with stage 3 chronic kidney disease, with long-term current use of insulin  GLUCOSE MONITORING CONTINUOUS MIN 72 HOURS    insulin (LANTUS SOLOSTAR U-100 INSULIN) glargine 100 units/mL (3mL) SubQ pen   2. Thyroid nodule     3. Hypovitaminosis D     4. Hyperparathyroidism     5. Osteopenia, unspecified location     6. Stage 3 chronic kidney disease     7. Coronary artery disease involving native coronary artery of native heart with angina pectoris     8. Essential hypertension     9. Hyperlipidemia, unspecified hyperlipidemia type     10. Diabetic polyneuropathy associated with type 2 diabetes mellitus  insulin aspart U-100 (NOVOLOG) 100 unit/mL (3 mL) InPn pen   11. Type 2 diabetes mellitus with diabetic polyneuropathy, with long-term current use of insulin  insulin (LANTUS SOLOSTAR U-100  INSULIN) glargine 100 units/mL (3mL) SubQ pen   12. Obesity, diabetes, and hypertension syndrome       Type 2 diabetes mellitus with stage 3 chronic kidney disease, with long-term current use of insulin  -- A1c goal 7.5-8.0%.  -- Medications discussed:  MFM - discontinued due to CKD in the past  DPP4  SGLT2 - refused  Insulin   -- Reviewed logs/CGM:  Significant glucose variability.   -- Professional CGM placed on patient while in clinic.  -- Dexcom AOB sent.    -- Medication Changes:   CHANGE:  Educated on rotating injection sites  Lantus 30units BID  Novolog 24units TIDAC   Tradjenta 5mg daily. Discussed MOA and SE.  -- Reviewed goals of therapy are to get the best control we can without hypoglycemia.  -- Reviewed patient's current insulin regimen. Clarified proper insulin dose and timing in relation to meals, etc. Insulin injection sites and proper rotation instructed.    -- Advised frequent self blood glucose monitoring.  Patient encouraged to document glucose results and bring them to every clinic visit.  -- Hypoglycemia precautions discussed. Instructed on precautions before driving.    -- Call for Bg repeatedly < 90 or > 180.   -- Close adherence to lifestyle changes recommended.   -- Periodic follow ups for eye evaluations, foot care and dental care suggested.    Thyroid nodule  -- Thyroid US stable - no recommendations to FNA.  -- TFTs unremarkable.  -- Repeat US if clinical change.    Hypovitaminosis D  -- On Vit D3 1000iu daily.    Hyperparathyroidism  -- Secondary to renal. Calcium unremarkable.     Osteopenia  -- Schedule BMD 7/2022.  -- Reassuring she is not fracturing.     Stage 3 chronic kidney disease  -- Lost to follow up with nephrology. Refer back.     Coronary artery disease involving native coronary artery of native heart with angina pectoris  -- Optimize glucose control.     Essential hypertension  -- Controlled on current medications.     Hyperlipidemia  -- Controlled.  -- On statin per ADA  recommendations.    Diabetic neuropathy associated with type 2 diabetes mellitus  -- Optimize glucose control.     Obesity, diabetes, and hypertension syndrome  -- Encouraged dietary and lifestyle modifications.  -- Emphasized weight loss goals.    Follow up in about 4 weeks (around 10/21/2020).

## 2020-09-23 ENCOUNTER — CLINICAL SUPPORT (OUTPATIENT)
Dept: ENDOCRINOLOGY | Facility: CLINIC | Age: 80
End: 2020-09-23
Payer: MEDICARE

## 2020-09-23 ENCOUNTER — OFFICE VISIT (OUTPATIENT)
Dept: ENDOCRINOLOGY | Facility: CLINIC | Age: 80
End: 2020-09-23
Payer: MEDICARE

## 2020-09-23 VITALS
HEIGHT: 68 IN | DIASTOLIC BLOOD PRESSURE: 74 MMHG | WEIGHT: 224.88 LBS | BODY MASS INDEX: 34.08 KG/M2 | HEART RATE: 70 BPM | SYSTOLIC BLOOD PRESSURE: 128 MMHG

## 2020-09-23 DIAGNOSIS — E66.9 OBESITY, DIABETES, AND HYPERTENSION SYNDROME: ICD-10-CM

## 2020-09-23 DIAGNOSIS — E11.22 TYPE 2 DIABETES MELLITUS WITH STAGE 3 CHRONIC KIDNEY DISEASE, WITH LONG-TERM CURRENT USE OF INSULIN: ICD-10-CM

## 2020-09-23 DIAGNOSIS — I25.119 CORONARY ARTERY DISEASE INVOLVING NATIVE CORONARY ARTERY OF NATIVE HEART WITH ANGINA PECTORIS: ICD-10-CM

## 2020-09-23 DIAGNOSIS — E11.69 OBESITY, DIABETES, AND HYPERTENSION SYNDROME: ICD-10-CM

## 2020-09-23 DIAGNOSIS — E78.5 HYPERLIPIDEMIA, UNSPECIFIED HYPERLIPIDEMIA TYPE: ICD-10-CM

## 2020-09-23 DIAGNOSIS — I10 ESSENTIAL HYPERTENSION: Chronic | ICD-10-CM

## 2020-09-23 DIAGNOSIS — N18.30 TYPE 2 DIABETES MELLITUS WITH STAGE 3 CHRONIC KIDNEY DISEASE, WITH LONG-TERM CURRENT USE OF INSULIN: ICD-10-CM

## 2020-09-23 DIAGNOSIS — Z79.4 TYPE 2 DIABETES MELLITUS WITH STAGE 3 CHRONIC KIDNEY DISEASE, WITH LONG-TERM CURRENT USE OF INSULIN: ICD-10-CM

## 2020-09-23 DIAGNOSIS — E11.22 TYPE 2 DIABETES MELLITUS WITH STAGE 3 CHRONIC KIDNEY DISEASE, WITH LONG-TERM CURRENT USE OF INSULIN: Primary | ICD-10-CM

## 2020-09-23 DIAGNOSIS — N18.30 STAGE 3 CHRONIC KIDNEY DISEASE: ICD-10-CM

## 2020-09-23 DIAGNOSIS — M85.80 OSTEOPENIA, UNSPECIFIED LOCATION: ICD-10-CM

## 2020-09-23 DIAGNOSIS — Z79.4 TYPE 2 DIABETES MELLITUS WITH STAGE 3 CHRONIC KIDNEY DISEASE, WITH LONG-TERM CURRENT USE OF INSULIN: Primary | ICD-10-CM

## 2020-09-23 DIAGNOSIS — E11.42 TYPE 2 DIABETES MELLITUS WITH DIABETIC POLYNEUROPATHY, WITH LONG-TERM CURRENT USE OF INSULIN: ICD-10-CM

## 2020-09-23 DIAGNOSIS — I15.2 OBESITY, DIABETES, AND HYPERTENSION SYNDROME: ICD-10-CM

## 2020-09-23 DIAGNOSIS — N18.30 TYPE 2 DIABETES MELLITUS WITH STAGE 3 CHRONIC KIDNEY DISEASE, WITH LONG-TERM CURRENT USE OF INSULIN: Primary | ICD-10-CM

## 2020-09-23 DIAGNOSIS — E11.59 OBESITY, DIABETES, AND HYPERTENSION SYNDROME: ICD-10-CM

## 2020-09-23 DIAGNOSIS — E21.3 HYPERPARATHYROIDISM: ICD-10-CM

## 2020-09-23 DIAGNOSIS — E55.9 HYPOVITAMINOSIS D: ICD-10-CM

## 2020-09-23 DIAGNOSIS — Z79.4 TYPE 2 DIABETES MELLITUS WITH DIABETIC POLYNEUROPATHY, WITH LONG-TERM CURRENT USE OF INSULIN: ICD-10-CM

## 2020-09-23 DIAGNOSIS — E04.1 THYROID NODULE: ICD-10-CM

## 2020-09-23 DIAGNOSIS — E11.42 DIABETIC POLYNEUROPATHY ASSOCIATED WITH TYPE 2 DIABETES MELLITUS: ICD-10-CM

## 2020-09-23 PROCEDURE — 99999 PR PBB SHADOW E&M-EST. PATIENT-LVL V: ICD-10-PCS | Mod: PBBFAC,HCNC,, | Performed by: NURSE PRACTITIONER

## 2020-09-23 PROCEDURE — 99499 RISK ADDL DX/OHS AUDIT: ICD-10-PCS | Mod: HCNC,S$GLB,, | Performed by: NURSE PRACTITIONER

## 2020-09-23 PROCEDURE — 1101F PT FALLS ASSESS-DOCD LE1/YR: CPT | Mod: HCNC,CPTII,S$GLB, | Performed by: NURSE PRACTITIONER

## 2020-09-23 PROCEDURE — 99214 PR OFFICE/OUTPT VISIT, EST, LEVL IV, 30-39 MIN: ICD-10-PCS | Mod: HCNC,S$GLB,, | Performed by: NURSE PRACTITIONER

## 2020-09-23 PROCEDURE — 3078F PR MOST RECENT DIASTOLIC BLOOD PRESSURE < 80 MM HG: ICD-10-PCS | Mod: HCNC,CPTII,S$GLB, | Performed by: NURSE PRACTITIONER

## 2020-09-23 PROCEDURE — 99499 UNLISTED E&M SERVICE: CPT | Mod: HCNC,S$GLB,, | Performed by: NURSE PRACTITIONER

## 2020-09-23 PROCEDURE — 1159F PR MEDICATION LIST DOCUMENTED IN MEDICAL RECORD: ICD-10-PCS | Mod: HCNC,S$GLB,, | Performed by: NURSE PRACTITIONER

## 2020-09-23 PROCEDURE — 99999 PR PBB SHADOW E&M-EST. PATIENT-LVL V: CPT | Mod: PBBFAC,HCNC,, | Performed by: NURSE PRACTITIONER

## 2020-09-23 PROCEDURE — 99214 OFFICE O/P EST MOD 30 MIN: CPT | Mod: HCNC,S$GLB,, | Performed by: NURSE PRACTITIONER

## 2020-09-23 PROCEDURE — 3074F PR MOST RECENT SYSTOLIC BLOOD PRESSURE < 130 MM HG: ICD-10-PCS | Mod: HCNC,CPTII,S$GLB, | Performed by: NURSE PRACTITIONER

## 2020-09-23 PROCEDURE — 99999 PR PBB SHADOW E&M-EST. PATIENT-LVL I: ICD-10-PCS | Mod: PBBFAC,HCNC,,

## 2020-09-23 PROCEDURE — 99999 PR PBB SHADOW E&M-EST. PATIENT-LVL I: CPT | Mod: PBBFAC,HCNC,,

## 2020-09-23 PROCEDURE — 3074F SYST BP LT 130 MM HG: CPT | Mod: HCNC,CPTII,S$GLB, | Performed by: NURSE PRACTITIONER

## 2020-09-23 PROCEDURE — 1159F MED LIST DOCD IN RCRD: CPT | Mod: HCNC,S$GLB,, | Performed by: NURSE PRACTITIONER

## 2020-09-23 PROCEDURE — 1126F AMNT PAIN NOTED NONE PRSNT: CPT | Mod: HCNC,S$GLB,, | Performed by: NURSE PRACTITIONER

## 2020-09-23 PROCEDURE — 1126F PR PAIN SEVERITY QUANTIFIED, NO PAIN PRESENT: ICD-10-PCS | Mod: HCNC,S$GLB,, | Performed by: NURSE PRACTITIONER

## 2020-09-23 PROCEDURE — 1101F PR PT FALLS ASSESS DOC 0-1 FALLS W/OUT INJ PAST YR: ICD-10-PCS | Mod: HCNC,CPTII,S$GLB, | Performed by: NURSE PRACTITIONER

## 2020-09-23 PROCEDURE — 3078F DIAST BP <80 MM HG: CPT | Mod: HCNC,CPTII,S$GLB, | Performed by: NURSE PRACTITIONER

## 2020-09-23 RX ORDER — INSULIN ASPART 100 [IU]/ML
24 INJECTION, SOLUTION INTRAVENOUS; SUBCUTANEOUS
Qty: 120 ML | Refills: 3 | Status: SHIPPED | OUTPATIENT
Start: 2020-09-23 | End: 2020-10-27

## 2020-09-23 RX ORDER — INSULIN GLARGINE 100 [IU]/ML
30 INJECTION, SOLUTION SUBCUTANEOUS 2 TIMES DAILY
Qty: 60 ML | Refills: 1 | Status: SHIPPED | OUTPATIENT
Start: 2020-09-23 | End: 2021-04-10 | Stop reason: SDUPTHER

## 2020-09-23 NOTE — ASSESSMENT & PLAN NOTE
-- A1c goal 7.5-8.0%.  -- Medications discussed:  MFM - discontinued due to CKD in the past  DPP4  SGLT2 - refused  Insulin   -- Reviewed logs/CGM:  Significant glucose variability.   -- Professional CGM placed on patient while in clinic.  -- Dexcom AOB sent.    -- Medication Changes:   CHANGE:  Educated on rotating injection sites  Lantus 30units BID  Novolog 24units TIDAC   Tradjenta 5mg daily. Discussed MOA and SE.  -- Reviewed goals of therapy are to get the best control we can without hypoglycemia.  -- Reviewed patient's current insulin regimen. Clarified proper insulin dose and timing in relation to meals, etc. Insulin injection sites and proper rotation instructed.    -- Advised frequent self blood glucose monitoring.  Patient encouraged to document glucose results and bring them to every clinic visit.  -- Hypoglycemia precautions discussed. Instructed on precautions before driving.    -- Call for Bg repeatedly < 90 or > 180.   -- Close adherence to lifestyle changes recommended.   -- Periodic follow ups for eye evaluations, foot care and dental care suggested.

## 2020-09-30 ENCOUNTER — CLINICAL SUPPORT (OUTPATIENT)
Dept: ENDOCRINOLOGY | Facility: CLINIC | Age: 80
End: 2020-09-30
Payer: MEDICARE

## 2020-09-30 DIAGNOSIS — E11.42 TYPE 2 DIABETES MELLITUS WITH DIABETIC POLYNEUROPATHY, WITH LONG-TERM CURRENT USE OF INSULIN: ICD-10-CM

## 2020-09-30 DIAGNOSIS — Z79.4 TYPE 2 DIABETES MELLITUS WITH DIABETIC POLYNEUROPATHY, WITH LONG-TERM CURRENT USE OF INSULIN: ICD-10-CM

## 2020-09-30 PROCEDURE — 95251 PR GLUCOSE MONITOR, 72 HOUR, PHYS INTERP: ICD-10-PCS | Mod: HCNC,S$GLB,, | Performed by: INTERNAL MEDICINE

## 2020-09-30 PROCEDURE — 95250 CONT GLUC MNTR PHYS/QHP EQP: CPT | Mod: HCNC,S$GLB,, | Performed by: INTERNAL MEDICINE

## 2020-09-30 PROCEDURE — 95250 PR GLUCOSE MONITORING,72 HRS,SUB-Q SENSOR: ICD-10-PCS | Mod: HCNC,S$GLB,, | Performed by: INTERNAL MEDICINE

## 2020-09-30 PROCEDURE — 95251 CONT GLUC MNTR ANALYSIS I&R: CPT | Mod: HCNC,S$GLB,, | Performed by: INTERNAL MEDICINE

## 2020-10-02 ENCOUNTER — HOSPITAL ENCOUNTER (OUTPATIENT)
Facility: OTHER | Age: 80
Discharge: HOME | End: 2020-10-02
Attending: ANESTHESIOLOGY | Admitting: ANESTHESIOLOGY
Payer: MEDICARE

## 2020-10-02 VITALS
RESPIRATION RATE: 18 BRPM | BODY MASS INDEX: 33.95 KG/M2 | HEIGHT: 68 IN | HEART RATE: 80 BPM | WEIGHT: 224 LBS | TEMPERATURE: 99 F | DIASTOLIC BLOOD PRESSURE: 96 MMHG | OXYGEN SATURATION: 94 % | SYSTOLIC BLOOD PRESSURE: 144 MMHG

## 2020-10-02 DIAGNOSIS — M54.16 LUMBAR RADICULOPATHY: Primary | ICD-10-CM

## 2020-10-02 DIAGNOSIS — G89.29 CHRONIC PAIN: ICD-10-CM

## 2020-10-02 LAB
POCT GLUCOSE: 204 MG/DL (ref 70–110)
POCT GLUCOSE: 207 MG/DL (ref 70–110)

## 2020-10-02 PROCEDURE — 63600175 PHARM REV CODE 636 W HCPCS: Mod: HCNC | Performed by: ANESTHESIOLOGY

## 2020-10-02 PROCEDURE — 64483 NJX AA&/STRD TFRM EPI L/S 1: CPT | Mod: 50,HCNC | Performed by: ANESTHESIOLOGY

## 2020-10-02 PROCEDURE — 64483 PR EPIDURAL INJ, ANES/STEROID, TRANSFORAMINAL, LUMB/SACR, SNGL LEVL: ICD-10-PCS | Mod: 50,HCNC,, | Performed by: ANESTHESIOLOGY

## 2020-10-02 PROCEDURE — 64483 NJX AA&/STRD TFRM EPI L/S 1: CPT | Mod: 50,HCNC,, | Performed by: ANESTHESIOLOGY

## 2020-10-02 PROCEDURE — 25500020 PHARM REV CODE 255: Mod: HCNC | Performed by: ANESTHESIOLOGY

## 2020-10-02 PROCEDURE — 25000003 PHARM REV CODE 250: Mod: HCNC | Performed by: ANESTHESIOLOGY

## 2020-10-02 PROCEDURE — 82947 ASSAY GLUCOSE BLOOD QUANT: CPT | Mod: HCNC | Performed by: ANESTHESIOLOGY

## 2020-10-02 RX ORDER — FENTANYL CITRATE 50 UG/ML
INJECTION, SOLUTION INTRAMUSCULAR; INTRAVENOUS
Status: DISCONTINUED | OUTPATIENT
Start: 2020-10-02 | End: 2020-10-02 | Stop reason: HOSPADM

## 2020-10-02 RX ORDER — MIDAZOLAM HYDROCHLORIDE 1 MG/ML
INJECTION INTRAMUSCULAR; INTRAVENOUS
Status: DISCONTINUED | OUTPATIENT
Start: 2020-10-02 | End: 2020-10-02 | Stop reason: HOSPADM

## 2020-10-02 RX ORDER — SODIUM CHLORIDE 9 MG/ML
500 INJECTION, SOLUTION INTRAVENOUS CONTINUOUS
Status: DISCONTINUED | OUTPATIENT
Start: 2020-10-02 | End: 2020-10-02 | Stop reason: HOSPADM

## 2020-10-02 RX ORDER — LIDOCAINE HYDROCHLORIDE 5 MG/ML
INJECTION, SOLUTION INFILTRATION; INTRAVENOUS
Status: DISCONTINUED | OUTPATIENT
Start: 2020-10-02 | End: 2020-10-02 | Stop reason: HOSPADM

## 2020-10-02 RX ORDER — DEXAMETHASONE SODIUM PHOSPHATE 10 MG/ML
INJECTION INTRAMUSCULAR; INTRAVENOUS
Status: DISCONTINUED | OUTPATIENT
Start: 2020-10-02 | End: 2020-10-02 | Stop reason: HOSPADM

## 2020-10-02 RX ORDER — LIDOCAINE HYDROCHLORIDE 10 MG/ML
INJECTION INFILTRATION; PERINEURAL
Status: DISCONTINUED | OUTPATIENT
Start: 2020-10-02 | End: 2020-10-02 | Stop reason: HOSPADM

## 2020-10-02 NOTE — DISCHARGE SUMMARY
Discharge Note  Short Stay      SUMMARY     Admit Date: 10/2/2020    Attending Physician: Oscar Ravi      Discharge Physician: Oscar Ravi      Discharge Date: 10/2/2020 10:47 AM    Procedure(s) (LRB):  INJECTION, STEROID, EPIDURAL, TRANSFORAMINAL APPROACH (Bilateral)    Final Diagnosis: Lumbar radiculopathy [M54.16]  DDD (degenerative disc disease), lumbar [M51.36]    Disposition: Home or self care    Patient Instructions:   Current Discharge Medication List      CONTINUE these medications which have NOT CHANGED    Details   !! ACCU-CHEK SMARTVIEW TEST STRIP Strp TEST THREE TIMES DAILY  Qty: 300 strip, Refills: 3      albuterol (VENTOLIN HFA) 90 mcg/actuation inhaler Inhale 2 puffs by mouth into the lungs every 4 (four) hours as needed for Wheezing. Rescue  Qty: 18 g, Refills: 1    Associated Diagnoses: Shortness of breath      allopurinol (ZYLOPRIM) 100 MG tablet Take 1 tablet (100 mg total) by mouth once daily.  Qty: 90 tablet, Refills: 3      amLODIPine (NORVASC) 5 MG tablet Take 1 tablet (5 mg total) by mouth once daily.  Qty: 90 tablet, Refills: 3    Associated Diagnoses: Coronary artery disease involving native coronary artery of native heart with angina pectoris; Essential hypertension; Hypercholesterolemia; Chronic diastolic congestive heart failure; Type 2 diabetes mellitus with diabetic polyneuropathy, with long-term current use of insulin; MARLON on CPAP; Chest pain, unspecified type; Syncope and collapse      aspirin (ECOTRIN) 81 MG EC tablet Take 1 tablet by mouth Daily.      atorvastatin (LIPITOR) 40 MG tablet Take 1 tablet (40 mg total) by mouth once daily.  Qty: 90 tablet, Refills: 3    Associated Diagnoses: Coronary artery disease involving native coronary artery of native heart with angina pectoris; Essential hypertension; Hypercholesterolemia; Type 2 diabetes mellitus with diabetic polyneuropathy, with long-term current use of insulin; Chest pain, unspecified type; Syncope and collapse     "  azelastine (ASTELIN) 137 mcg (0.1 %) nasal spray Spray 1 spray (137 mcg total) by Nasal route 2 (two) times daily.  Qty: 30 mL, Refills: 3      BD ALCOHOL SWABS PadM USE  WHEN  TESTING BLOOD SUGAR FOUR TIMES DAILY  Qty: 400 each, Refills: 3    Comments: Please inactivate all prior scripts with same name and strength including any scripts on hold.  Associated Diagnoses: Type 2 diabetes mellitus with stage 3 chronic kidney disease, with long-term current use of insulin      BD ULTRA-FINE OLU PEN NEEDLES 32 x 5/32 " Ndle Uses 4 times daily, on multiple daily insulin injections  Qty: 130 each, Refills: 12    Associated Diagnoses: Type 2 diabetes mellitus with diabetic polyneuropathy      !! blood sugar diagnostic Strp One Touch verio test strips - check blood sugar three times daily  Qty: 150 strip, Refills: 4    Associated Diagnoses: Type 2 diabetes mellitus with other circulatory complication, with long-term current use of insulin      blood-glucose meter kit Use as instructed  Qty: 1 each, Refills: 0    Comments: Accu check smartview meter to go with accu check smartview test strips. Check blood sugar once daily.  Associated Diagnoses: Type 2 diabetes mellitus with other circulatory complication, with long-term current use of insulin      carbamide peroxide (EAR WAX REMOVAL DROPS OTIC)       carvediloL (COREG) 25 MG tablet Take 1 tablet (25 mg total) by mouth 2 (two) times daily.  Qty: 180 tablet, Refills: 3    Comments: .      cholecalciferol, vitamin D3, (DIALYVITE VITAMIN D) 5,000 unit capsule Take 5,000 Units by mouth once daily.      DULoxetine (CYMBALTA) 60 MG capsule Take 1 capsule (60 mg total) by mouth 2 (two) times daily.  Qty: 180 capsule, Refills: 3    Associated Diagnoses: Diabetic polyneuropathy associated with type 2 diabetes mellitus      fluticasone (FLONASE) 50 mcg/actuation nasal spray SHAKE LQ AND U 2 SPRAYS IEN QD  Qty: 1 Bottle, Refills: 6      furosemide (LASIX) 40 MG tablet TAKE 1 TABLET BY " MOUTH TWICE DAILY  Qty: 180 tablet, Refills: 4      insulin (LANTUS SOLOSTAR U-100 INSULIN) glargine 100 units/mL (3mL) SubQ pen Inject 30 Units into the skin 2 (two) times a day.  Qty: 60 mL, Refills: 1    Associated Diagnoses: Type 2 diabetes mellitus with diabetic polyneuropathy, with long-term current use of insulin; Type 2 diabetes mellitus with stage 3 chronic kidney disease, with long-term current use of insulin      insulin aspart U-100 (NOVOLOG) 100 unit/mL (3 mL) InPn pen Inject 24 Units into the skin 3 (three) times daily with meals.  Qty: 120 mL, Refills: 3    Associated Diagnoses: Diabetic polyneuropathy associated with type 2 diabetes mellitus      !! lancets (ACCU-CHEK FASTCLIX LANCING DEV) Misc Lancets and device = check blood sugar 4 times daily  Qty: 400 each, Refills: 4      linaGLIPtin (TRADJENTA) 5 mg Tab tablet Take 1 tablet (5 mg total) by mouth once daily.  Qty: 90 tablet, Refills: 3    Associated Diagnoses: Type 2 diabetes mellitus with hyperglycemia, with long-term current use of insulin      loratadine (CLARITIN) 10 mg tablet Take 1 tablet (10 mg total) by mouth once daily.  Qty: 30 tablet, Refills: 4    Associated Diagnoses: Allergic rhinitis      meclizine (ANTIVERT) 12.5 mg tablet Take 1 tablet (12.5 mg total) by mouth 3 (three) times daily as needed for Dizziness.  Qty: 30 tablet, Refills: 0      olopatadine (PATADAY) 0.2 % Drop Place 1 drop into both eyes once daily.  Qty: 2.5 mL, Refills: 12      !! oxyCODONE-acetaminophen (PERCOCET)  mg per tablet Take one tablet by mouth 5 times daily  Qty: 150 tablet, Refills: 0    Comments: Quantity prescribed more than 7 day supply? Yes, quantity medically necessary      !! oxyCODONE-acetaminophen (PERCOCET)  mg per tablet Take 1 tablet by mouth 5 (five) times daily.  Qty: 150 tablet, Refills: 0    Comments: Quantity prescribed more than 7 day supply? Yes, quantity medically necessary      pantoprazole (PROTONIX) 40 MG tablet TAKE 1  "TABLET(40 MG) BY MOUTH twice daily  Qty: 180 tablet, Refills: 4    Comments: **Patient requests 90 days supply**      pen needle, diabetic (SURE-FINE PEN NEEDLES) 29 gauge x 1/2" Ndle Use 4 times daily  Qty: 400 each, Refills: 4      rOPINIRole (REQUIP) 0.5 MG tablet TAKE 1 TABLET (0.5 MG TOTAL) BY MOUTH EVERY EVENING.  Qty: 90 tablet, Refills: 1      triamcinolone acetonide 0.1% (KENALOG) 0.1 % cream Apply topically 3 (three) times daily.  Qty: 28.4 g, Refills: 0    Associated Diagnoses: Allergic reaction, initial encounter      !! TRUEPLUS LANCETS 33 gauge Misc TEST BLOOD SUGAR FOUR TIMES DAILY  Qty: 400 each, Refills: 4       !! - Potential duplicate medications found. Please discuss with provider.              Discharge Diagnosis: Lumbar radiculopathy [M54.16]  DDD (degenerative disc disease), lumbar [M51.36]  Condition on Discharge: Stable with no complications to procedure   Diet on Discharge: Same as before.  Activity: as per instruction sheet.  Discharge to: Home with a responsible adult.  Follow up: 2-4 weeks        "

## 2020-10-02 NOTE — DISCHARGE INSTRUCTIONS
Thank you for allowing us to care for you today. You may receive a survey about the care we provided. Your feedback is valuable and helps us provide excellent care throughout the community.     Home Care Instructions for Pain Management:    1. DIET:   You may resume your normal diet today.   2. BATHING:   You may shower with luke warm water. No tub baths or anything that will soak injection sites under water for the next 24 hours.  3. DRESSING:   You may remove your bandage today.   4. ACTIVITY LEVEL:   You may resume your normal activities 24 hrs after your procedure. Nothing strenuous today.  5. MEDICATIONS:   You may resume your normal medications today. To restart blood thinners, ask your doctor.  6. DRIVING    If you have received any sedatives by mouth today, you may not drive for 12 hours.    If you have received any sedation through your IV, you may not drive for 24 hrs.   7. SPECIAL INSTRUCTIONS:   No heat to the injection site for 24 hrs including, hot bath or shower, heating pad, moist heat, or hot tubs.    Use ice pack to injection site for any pain or discomfort.  Apply ice packs for 20 minute intervals as needed.    IF you have diabetes, be sure to monitor your blood sugar more closely. IF your injection contained steroids your blood sugar levels may become higher than normal.    If you are still having pain upon discharge:  Your pain may improve over the next 48 hours. The anesthetic (numbing medication) works immediately to 48 hours. IF your injection contained a steroid (anti-inflammatory medication), it takes approximately 3 days to start feeling relief and 7-10 days to see your greatest results from the medication. It is possible you may need subsequent injections. This would be discussed at your follow up appointment with pain management or your referring doctor.    Please call the PAIN MANAGEMENT office at [phone] or ON CALL pager at [phone] if you experienced any:   -Weakness or  loss of sensation  -Fever > 101.5  -Pain uncontrolled with oral medications   -Persistent nausea, vomiting, or diarrhea  -Redness or drainage from the injection sites, or any other worrisome concerns.   If physician on call was not reached or could not communicate with our office for any reason please go to the nearest emergency department.  Adult Procedural Sedation Instructions    Recovery After Procedural Sedation (Adult)  You have been given medicine by vein to make you sleep during your surgery. This may have included both a pain medicine and sleeping medicine. Most of the effects have worn off. But you may still have some drowsiness for the next 6 to 8 hours.  Home care  Follow these guidelines when you get home:  · For the next 8 hours, you should be watched by a responsible adult. This person should make sure your condition is not getting worse.  · Don't drink any alcohol for the next 24 hours.  · Don't drive, operate dangerous machinery, or make important business or personal decisions during the next 24 hours.  Note: Your healthcare provider may tell you not to take any medicine by mouth for pain or sleep in the next 4 hours. These medicines may react with the medicines you were given in the hospital. This could cause a much stronger response than usual.  Follow-up care  Follow up with your healthcare provider if you are not alert and back to your usual level of activity within 12 hours.  When to seek medical advice  Call your healthcare provider right away if any of these occur:  · Drowsiness gets worse  · Weakness or dizziness gets worse  · Repeated vomiting  · You can't be awakened   Date Last Reviewed: 10/18/2016  © 5429-2290 [employer]. [address] [place] [address], [place] [address]. All rights reserved. This information is not intended as a substitute for professional medical care. Always follow your healthcare professional's instructions.

## 2020-10-02 NOTE — OP NOTE
Patient Name: Henrietta Villasenor  MRN: [id number]    INFORMED CONSENT: The procedure, risks, benefits and options were discussed with patient. There are no contraindications to the procedure. The patient expressed understanding and agreed to proceed. The personnel performing the procedure was discussed. I verify that I personally obtained Henrietta's consent prior to the start of the procedure and the signed consent can be found on the patient's chart.    Procedure Date: 10/02/2020    Anesthesia: Topical    Pre Procedure diagnosis: Lumbar radiculopathy [M54.16]  DDD (degenerative disc disease), lumbar [M51.36]  1. Lumbar radiculopathy    2. Chronic pain      Post-Procedure diagnosis: SAME      Sedation: Yes - Fentanyl 100 mcg and Midazolam 2 mg  Sedation time: Less than 15 minutes  Conscious sedation ordered by MD. Patient reevaluated and sedation administered by MD and monitored by RN.         PROCEDURE:Bilateral L4/L5   TRANSFORAMINAL EPIDURAL STEROID INJECTION        DESCRIPTION OF PROCEDURE: The patient was brought to the procedure room. After performing time out IV access was obtained prior to the procedure. The patient was positioned prone on the fluoroscopy table. Continuous hemodynamic monitoring was initiated including blood pressure, EKG, and pulse oximetry. . The skin was prepped with chlorhexidine three times and draped in a sterile fashion. Skin anesthesia was achieved using 3 mL of lidocaine 1% over the respective injection site.     An oblique fluoroscopic view was obtained, with the superior articular process of the inferior vertebral body aligned with the pedicle. The tip of a 22-gauge 5-inch Quincke-type spinal needle was advanced toward the 6 oclock position of the L4 pedicle under intermittent fluoroscopic guidance. Confirmation of proper needle position was made with AP, oblique, and lateral fluoroscopic views. Negative aspiration for blood or CSF was confirmed. 2 mL of Omnipaque 300 was injected. Live  fluoroscopic imaging revealed a clear outline of the spinal nerve with proximal spread of agent through the neural foramen into the anterior epidural space. A total combination of 3 mL of Bupivacaine 0.25% and 10 mg decadron was injected at each level. Contrast spread was noted from L3 to L4 level. There was no pain on injection. The needle was removed and bleeding was nil.  A sterile dressing was applied. Henrietta was taken back to the recovery room for further observation.     Blood Loss: Nill  Specimen: None    Oscar Ravi MD

## 2020-10-08 ENCOUNTER — TELEPHONE (OUTPATIENT)
Dept: ENDOSCOPY | Facility: HOSPITAL | Age: 80
End: 2020-10-08

## 2020-10-08 RX ORDER — OXYCODONE AND ACETAMINOPHEN 10; 325 MG/1; MG/1
1 TABLET ORAL
Qty: 150 TABLET | Refills: 0 | Status: SHIPPED | OUTPATIENT
Start: 2020-10-08 | End: 2020-11-09 | Stop reason: SDUPTHER

## 2020-10-08 NOTE — TELEPHONE ENCOUNTER
----- Message from Bessie Eli sent at 10/8/2020  8:28 AM CDT -----  Contact: Self   Requesting an RX refill or new RX.  Is this a refill or new RX:    RX name and strength: oxyCODONE-acetaminophen (PERCOCET)  mg per tablet  Directions (copy/paste from chart):   Take one tablet by mouth 5 times daily   Is this a 30 day or 90 day RX:    Local pharmacy or mail order pharmacy:    Pharmacy name and phone # (copy/paste from chart):   [facility] [phone] (Phone)  [phone] (Fax)      Comments:

## 2020-10-16 ENCOUNTER — OFFICE VISIT (OUTPATIENT)
Dept: PAIN MEDICINE | Facility: CLINIC | Age: 80
End: 2020-10-16
Payer: MEDICARE

## 2020-10-16 VITALS
SYSTOLIC BLOOD PRESSURE: 146 MMHG | TEMPERATURE: 98 F | HEIGHT: 68 IN | WEIGHT: 218.25 LBS | DIASTOLIC BLOOD PRESSURE: 71 MMHG | OXYGEN SATURATION: 100 % | RESPIRATION RATE: 18 BRPM | HEART RATE: 72 BPM | BODY MASS INDEX: 33.08 KG/M2

## 2020-10-16 DIAGNOSIS — M47.816 LUMBAR SPONDYLOSIS: ICD-10-CM

## 2020-10-16 DIAGNOSIS — M50.30 DDD (DEGENERATIVE DISC DISEASE), CERVICAL: ICD-10-CM

## 2020-10-16 DIAGNOSIS — M79.18 MYOFASCIAL PAIN: ICD-10-CM

## 2020-10-16 DIAGNOSIS — M51.36 DDD (DEGENERATIVE DISC DISEASE), LUMBAR: ICD-10-CM

## 2020-10-16 DIAGNOSIS — M48.062 SPINAL STENOSIS OF LUMBAR REGION WITH NEUROGENIC CLAUDICATION: ICD-10-CM

## 2020-10-16 DIAGNOSIS — G89.4 CHRONIC PAIN DISORDER: ICD-10-CM

## 2020-10-16 DIAGNOSIS — M54.16 LUMBAR RADICULOPATHY: Primary | ICD-10-CM

## 2020-10-16 DIAGNOSIS — M47.812 CERVICAL SPONDYLOSIS: ICD-10-CM

## 2020-10-16 PROCEDURE — 3077F PR MOST RECENT SYSTOLIC BLOOD PRESSURE >= 140 MM HG: ICD-10-PCS | Mod: HCNC,CPTII,S$GLB, | Performed by: NURSE PRACTITIONER

## 2020-10-16 PROCEDURE — 99999 PR PBB SHADOW E&M-EST. PATIENT-LVL V: ICD-10-PCS | Mod: PBBFAC,HCNC,, | Performed by: NURSE PRACTITIONER

## 2020-10-16 PROCEDURE — 3078F DIAST BP <80 MM HG: CPT | Mod: HCNC,CPTII,S$GLB, | Performed by: NURSE PRACTITIONER

## 2020-10-16 PROCEDURE — 3078F PR MOST RECENT DIASTOLIC BLOOD PRESSURE < 80 MM HG: ICD-10-PCS | Mod: HCNC,CPTII,S$GLB, | Performed by: NURSE PRACTITIONER

## 2020-10-16 PROCEDURE — 1125F AMNT PAIN NOTED PAIN PRSNT: CPT | Mod: HCNC,S$GLB,, | Performed by: NURSE PRACTITIONER

## 2020-10-16 PROCEDURE — 1101F PT FALLS ASSESS-DOCD LE1/YR: CPT | Mod: HCNC,CPTII,S$GLB, | Performed by: NURSE PRACTITIONER

## 2020-10-16 PROCEDURE — 1159F MED LIST DOCD IN RCRD: CPT | Mod: HCNC,S$GLB,, | Performed by: NURSE PRACTITIONER

## 2020-10-16 PROCEDURE — 99999 PR PBB SHADOW E&M-EST. PATIENT-LVL V: CPT | Mod: PBBFAC,HCNC,, | Performed by: NURSE PRACTITIONER

## 2020-10-16 PROCEDURE — 1101F PR PT FALLS ASSESS DOC 0-1 FALLS W/OUT INJ PAST YR: ICD-10-PCS | Mod: HCNC,CPTII,S$GLB, | Performed by: NURSE PRACTITIONER

## 2020-10-16 PROCEDURE — 1159F PR MEDICATION LIST DOCUMENTED IN MEDICAL RECORD: ICD-10-PCS | Mod: HCNC,S$GLB,, | Performed by: NURSE PRACTITIONER

## 2020-10-16 PROCEDURE — 99213 OFFICE O/P EST LOW 20 MIN: CPT | Mod: HCNC,S$GLB,, | Performed by: NURSE PRACTITIONER

## 2020-10-16 PROCEDURE — 3077F SYST BP >= 140 MM HG: CPT | Mod: HCNC,CPTII,S$GLB, | Performed by: NURSE PRACTITIONER

## 2020-10-16 PROCEDURE — 99213 PR OFFICE/OUTPT VISIT, EST, LEVL III, 20-29 MIN: ICD-10-PCS | Mod: HCNC,S$GLB,, | Performed by: NURSE PRACTITIONER

## 2020-10-16 PROCEDURE — 1125F PR PAIN SEVERITY QUANTIFIED, PAIN PRESENT: ICD-10-PCS | Mod: HCNC,S$GLB,, | Performed by: NURSE PRACTITIONER

## 2020-10-16 RX ORDER — FUROSEMIDE 40 MG/1
TABLET ORAL
Qty: 180 TABLET | Refills: 4 | Status: SHIPPED | OUTPATIENT
Start: 2020-10-16 | End: 2021-10-04 | Stop reason: SDUPTHER

## 2020-10-16 NOTE — TELEPHONE ENCOUNTER
No new care gaps identified.  Powered by [employer]. Reference number: [id number]. 10/16/2020 10:56:08 AM   YONAS

## 2020-10-16 NOTE — TELEPHONE ENCOUNTER
----- Message from Charisse Manley sent at 10/16/2020 10:48 AM CDT -----  Contact: [phone]  Requesting an RX refill or new RX.  Is this a refill or new RX:  refill  RX name and strength: furosemide (LASIX) 40 MG tablet  Is this a 30 day or 90 day RX:  90  Pharmacy name and phone # (copy/paste from chart):  [id number] [place] [address] AT [facility] [phone] (Phone)  [phone] (Fax)  Comments:

## 2020-10-16 NOTE — PROGRESS NOTES
Chronic Pain - Established Visit    Referring Physician: No ref. provider found    Chief Complaint:   Chief Complaint   Patient presents with    Neck Pain    Low-back Pain        SUBJECTIVE:    Interval History 10/16/2020:  The patient returns to clinic today for follow up of neck and low back pain. She is s/p bilateral L4/5 TF JANEE on 10/2/2020. She reports 50% relief of her low back and leg pain. She continues to report low back pain with intermittent radiating pain into her left leg. She continues to report neck pain. She denies any radicular arm pain. Her pain is worse with prolonged activity. She also reports difficulty sleeping due to pain. She is no longer taking Robaxin due to sleepiness. She denies any other health changes. Her pain today is 7/10.    Interval History 9/18/2020:  The patient returns to clinic today for follow up of neck and low back pain. She was scheduled for lumbar JANEE but this was canceled. She continues to report low back pain that radiates down the lateral aspect of both legs to her ankles. She reports increased pain with prolonged activity. She reports increased neck pain that is constant, sharp, and aching. She denies any radiating arm pain. Her pain is worse with turning her head to the side. She reports difficulty sleeping due to pain. She denies any other health changes. Her pain today is 8/10.    Interval History 7/9/2020:  The patient returns to clinic today for follow up of neck pain. She is s/p bilateral C4,5,6 MBB on 6/24/2020. She reports 90% relief of her neck pain. She continues to report benefit. She reports intermittent neck pain that is tolerable at this time. She denies any radiating arm pain. She does report increased low back pain that intermittently radiates down the posterolateral aspect of her legs to her ankles bilaterally. This pain is worse with activity. She denies any other health changes. Her pain today is 8/10.    Interval History 6/15/2020:  The patient  presents for follow up of severe Left>R sided neck pain with radiation into shoulder and states pain does not go below shoulders. Pt was scheduled for B C4,5,6 MBB but secondary to covid she had to reschedule. The patient denies myelopathic symptoms such as handwriting changes or difficulty with buttons/coins/keys. Denies perineal paresthesias, bowel/bladder dysfunction. She states her CBGs are in low 100s at this time. She is taking Robaxin PRN.     Interval History 4/3/2020:  Follow up is for neck pain. She was scheduled for C4, C5, C6 MBB on 3/20/20 but this had to be canceled for now due to the covid epidemic. At last visit her tizanidine prescription was changed to robaxan. She says her pain is tolerable with robaxan and Biofreeze which she purchased over-the-counter.  Denies any numbness, weakness, bowel/bladder changes.     Interval History 3/4/2020:  The patient returns to clinic today for follow up of neck pain. She reports that trigger point injections at last visit provided significant relief until the last week. She reports neck pain that is sharp and aching in nature. She denies any radiating arm pain. Her pain is worse with turning her head to the side. She reports difficulty doing housework due to pain. She is currently taking Zanaflex with limited relief. She does take Percocet per her PCP with benefit. She denies any other health changes. Her pain today is 9/10.        Interval History 1/30/2020:  The patient is here to discuss new onset neck pain.  It is mainly located over the left neck and shoulder area.  She denies radiation past the shoulder.  She denies numbness and tingling.  She denies weakness.  She had a recent ED visit for dizziness.  She had CT scans to rule out clot.  She would like an injection today.  She denies bowel or bladder changes.  Her pain today is 10/10.    Interval History 12/3/2019:  The patient is here for follow up of back pain.  Her pain is radiating down the sides of  both legs with numbness.  Her BP remains elevated today.  She is followed by her PCP for this.  She denies SOB or chest pain today.  She thinks that it is always high when she comes to [facility] due to walking far from the garage.  She did have benefit with previous bilateral L4-5 TF JANEE.  Her pain today is 4/10.  The patient denies any bowel or bladder incontinence or signs of saddle paresthesia.  The patient denies any major medical changes since last office visit.    Interval History 10/1/2019:  The patient is here for follow up today of back and leg pain.  She is s/p bilateral L4-5 TF JANEE with 50% relief.  She says that her pain is more tolerable.  She has more pain with walking, such as walking to our office today.  She has persistent burning to both feet.  She has a known history of diabetes.  Her pain is worse at night and she describes it as burning.  She has intermittent radiation down the sides of both legs.  Her back pain is greater than her leg pain.  She takes Percocet from her PCP with benefit.  She would like to repeat Healthy Back.  She feels as though this was helpful and would like to restart.  Her pain today is 7/10.  Her blood pressure is elevated today.  She says that she has not taken her medication today because she was rushing to get to her appointment.  She denies headache, SOB, or chest pain.     Initial Encounter:  Henrietta Villasenor presents to the clinic for the evaluation of low back pain. The pain started and symptoms have been worsening.The pain is located in the low back area and radiates to the bilateral leg swelling.  The pain is described as sharp, shooting, throbbing, tingling and constant and is rated as 5/10. The pain is rated with a score of  5/10 on the BEST day and a score of 8/10 on the WORST day.  Symptoms interfere with daily activity and sleeping. The pain is exacerbated by Standing, Bending, Coughing/Sneezing, Walking, Morning, Lifting and Getting out of bed/chair.  The pain  is mitigated by medications. She reports spending 0 hours per day reclining. The patient reports 2 hours of uninterrupted sleep per night.    Patient denies night fever/night sweats, urinary incontinence, bowel incontinence, significant weight loss, significant motor weakness and loss of sensations.    Physical Therapy/Home Exercise: yes      Pain Disability Index Review:  Last 3 PDI Scores 10/16/2020 9/18/2020 7/9/2020   Pain Disability Index (PDI) 21 49 60       Pain Medications:    - Opioids: Percocet (Oxycodone/Acetaminophen)  - Adjuvant Medications: Cymbalta ( Duloxetine) and Tizanidine  - Anti-Coagulants: None  - Others: None     report:  Reviewed and consistent with medication use as prescribed.    Pain Procedures:   9/17/19 Bilateral L4-5 TF JANEE- 50% relief  6/24/2020- Bilateral C4,5,6 MBB-90% relief  10/2/2020- Bilateral L4/5 TF JANEE    Imaging:  CT Cervical Spine 1/2/2020:    FINDINGS:  No evidence of acute cervical spine fracture or dislocation.  Odontoid process is intact.  Craniocervical junction is unremarkable.  Cervical spine alignment is within normal limits.  Prominent multilevel degenerative changes are seen with DISH.    Surrounding soft tissues show no significant abnormalities.  Airway is patent.      Impression       No evidence of acute cervical spine fracture or dislocation.         6/12/18 MRI Lumbar  Narrative     EXAMINATION:  MRI LUMBAR SPINE WITHOUT CONTRAST    CLINICAL HISTORY:  low back and right leg pain; Other intervertebral disc degeneration, lumbar region    TECHNIQUE:  Multiplanar, multisequence MR images were acquired from the thoracolumbar junction to the sacrum without the administration of contrast.    COMPARISON:  Radiograph 06/05/2018    FINDINGS:  Alignment: Normal.    Vertebrae: Normal marrow signal. No fracture.    Discs: Normal height and signal.    Cord: Normal.  Conus terminates at superior border of L1    Degenerative findings:    T12-L1: Mild broad-based disc  bulge without compressive sequelae.    L1-L2: Unremarkable.    L2-L3: Mild broad-based disc bulge with facet arthropathy.  No compressive sequelae.    L3-L4: Broad-based disc bulge and facet arthropathy.  There is mild bilateral neural foraminal narrowing.    L4-L5: Trace anterolisthesis of L4 on L5 with broad-based disc bulge and facet arthropathy.  This results in moderate bilateral neural foraminal narrowing.  Moderate canal stenosis.  There is also significant narrowing of the left greater than right lateral recesses.    L5-S1: Broad-based disc bulge without significant compressive sequelae.    Paraspinal muscles & soft tissues: Paraspinal atrophy.  No concerning findings in the visualized abdomen.  Add      Impression       Moderate degenerative change at L4-5.  Milder degenerative changes elsewhere in the lumbar spine.     6/5/18 Xray Lumbar  Narrative     EXAMINATION:  XR LUMBAR SPINE 5 VIEW WITH FLEX AND EXT    CLINICAL HISTORY:  low back and right leg pain;  Other intervertebral disc degeneration, lumbar region    TECHNIQUE:  Five views of the lumbar spine plus flexion extension views were performed.    COMPARISON:  02/03/2015    FINDINGS:  The lumbar vertebra are intact.  No compression fracture is identified.  Degenerative changes are again seen with small osteophytes at multiple levels.  L4-5 disc space is mildly narrowed.  Degenerative changes are also noted at lower facet joints.  There is associated grade 1 spondylolisthesis at L4-5 which is stable between flexion and extension at about 6 mm.  Alignment is otherwise satisfactory.    Visualized abdomen shows aortic atherosclerosis and surgical clips in the right upper quadrant.      Impression       No acute abnormality.  Chronic degenerative spine changes.           Past Medical History:   Diagnosis Date    Abnormality of lung 11/08/2011    Stable bandlike opacities at the lung bases, most likely representing      Anxiety     Arthritis     CAD  (coronary artery disease)     Calculus of ureter 2/22/2011    CHF (congestive heart failure)     Chronic back pain 7/29/2012    Colon polyp 9/2010; 11/2010    Colon polyps 7/29/2012    Coronary artery disease involving native coronary artery of native heart with angina pectoris     Depression     Diabetes mellitus     Diabetes mellitus type II     Diverticulitis large intestine 7/27/2015    Diverticulitis of large intestine without perforation or abscess without bleeding     Diverticulosis 09/25/2010; 11/02/2010; 11/08/2011; 7/29/2012    Duodenal disorder 08/25/2011    Duodenal erosion noted on EGD.    Duodenal ulcer, unspecified as acute or chronic, without hemorrhage, perforation, or obstruction 8/24/2011    E. coli sepsis 12/2010    Due to left ureteral stone with left nephrostomy tube - hospitalized in [place]    Esophageal dysmotility 01/24/2012    Noted on upper GI-barium swallow.    Facial weakness 1969    Left facial weakness s/p left mastoidectomy in ~ 1969.    Fatty liver 11/08/2011    Reported on CT-abdomen and in 06/2012 Gastro clinic visit note.    Gastric polyp 09/29/2010    GERD (gastroesophageal reflux disease) 7/29/2012    Hepatomegaly 11/08/2011    Reported on CT-abdomen    Herpes zoster with other nervous system complications(053.19) 2/28/2011    Hiatal hernia 06/26/2006; 09/29/2010; 08/25/2011    Noted on barium swallow 2006; noted on  EGD 2011.   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    Trouble in sleeping     Type II or unspecified type diabetes mellitus with neurological manifestations, not stated as uncontrolled(250.60)     Type II or unspecified type diabetes mellitus with peripheral circulatory disorders, not stated as uncontrolled(250.70)     Type II or unspecified type diabetes mellitus with renal manifestations, not stated as uncontrolled(250.40)      Past Surgical History:   Procedure Laterality Date    BELPHAROPTOSIS REPAIR      s/p LAMBERTO levator repair - Dr. Dejesus    CARDIAC CATHETERIZATION      CARPAL TUNNEL RELEASE  3/13/2012    CATARACT EXTRACTION W/  INTRAOCULAR LENS IMPLANT Right 10/31/2018        CATARACT EXTRACTION W/  INTRAOCULAR LENS IMPLANT Left 03/22/2018        CHOLECYSTECTOMY      COLONOSCOPY N/A 11/16/2016    Procedure: COLONOSCOPY;  Surgeon: Chris tSorey MD;  Location: [address]);  Service: Endoscopy;  Laterality: N/A;    COLONOSCOPY N/A 6/14/2017    Procedure: COLONOSCOPY;  Surgeon: Chris Storey MD;  Location: [facility] ([address] [place] [address]);  Service: Endoscopy;  Laterality: N/A;  colonoscopy in 3 months with better bowel prep. - Split PEG prep ordered    EXTRACORPOREAL SHOCK WAVE LITHOTRIPSY      INJECTION OF ANESTHETIC AGENT AROUND NERVE Bilateral 6/24/2020    Procedure: BLOCK, NERVE, C4-C5-C6 MEDIAL BRANCH ok per Talia to add on;  Surgeon: Oscar Ravi MD;  Location: [facility];  Service: Pain Management;  Laterality: Bilateral;    INTRAOCULAR PROSTHESES INSERTION Right 10/31/2018    Procedure: INSERTION-INTRAOCULAR LENS (IOL);  Surgeon: Keysha Valle MD;  Location: [facility] OR;  Service: Ophthalmology;  Laterality: Right;    JOINT REPLACEMENT      mastoid tumor removal  1969    Left  mastoidectomy with residual left facial weakness.    NEPHROSTOMY      OOPHORECTOMY      PHACOEMULSIFICATION OF CATARACT Right 10/31/2018    Procedure: PHACOEMULSIFICATION-ASPIRATION-CATARACT;  Surgeon: Keysha Valle MD;  Location: [facility] OR;  Service: Ophthalmology;  Laterality: Right;    TOTAL ABDOMINAL HYSTERECTOMY  1969    TAHBSO    TOTAL KNEE ARTHROPLASTY  2012    Left    TRANSFORAMINAL EPIDURAL INJECTION OF STEROID Bilateral 2019    Procedure: INJECTION, STEROID, EPIDURAL, TRANSFORAMINAL APPROACH;  Surgeon: Oscar Ravi MD;  Location: [facility];  Service: Pain Management;  Laterality: Bilateral;  B/L TFESI L4/L5    TRANSFORAMINAL EPIDURAL INJECTION OF STEROID Bilateral 10/2/2020    Procedure: INJECTION, STEROID, EPIDURAL, TRANSFORAMINAL APPROACH;  Surgeon: Oscar Ravi MD;  Location: [facility];  Service: Pain Management;  Laterality: Bilateral;  B/L TFESI L4/5     Social History     Socioeconomic History    Marital status: Single     Spouse name: Not on file    Number of children: 5    Years of education: Not on file    Highest education level: Not on file   Occupational History    Not on file   Social Needs    Financial resource strain: Somewhat hard    Food insecurity     Worry: Sometimes true     Inability: Often true    Transportation needs     Medical: No     Non-medical: No   Tobacco Use    Smoking status: Former Smoker     Packs/day: 1.00     Years: 15.00     Pack years: 15.00     Types: Cigarettes     Quit date: 2000     Years since quittin.2    Smokeless tobacco: Never Used   Substance and Sexual Activity    Alcohol use: No    Drug use: No    Sexual activity: Never   Lifestyle    Physical activity     Days per week: 0 days     Minutes per session: 0 min    Stress: Very much   Relationships    Social connections     Talks on phone: More than three times a week     Gets together: Once a week     Attends [employer] service: More than 4 times per  year     Active member of club or organization: Yes     Attends meetings of clubs or organizations: 1 to 4 times per year     Relationship status: Never    Other Topics Concern    Are you pregnant or think you may be? Not Asked    Breast-feeding Not Asked   Social History Narrative    Single with 5 children. Lives alone. Retired cook.     Family History   Problem Relation Age of Onset    Sleep apnea Sister     Diabetes Sister     Cancer Mother         brain tumor    Hypertension Mother     Heart disease Father     Heart attack Father     Dementia Brother     Lupus Brother     Frontotemporal dementia Brother     No Known Problems Daughter     No Known Problems Son     Diabetes Sister     Lupus Sister     Breast cancer Sister     Diabetes Sister     Cancer Sister         breast cancer    Heart attack Sister     Diabetes Sister     Liver cancer Brother     Drug abuse Brother     Alcohol abuse Brother     Cirrhosis Brother     Drug abuse Brother     No Known Problems Daughter     No Known Problems Son     No Known Problems Son     No Known Problems Maternal Grandmother     No Known Problems Maternal Grandfather     No Known Problems Paternal Grandmother     No Known Problems Paternal Grandfather     Diabetes Brother     Dementia Brother     Diabetes Other     Ovarian cancer Other         Niece     Breast cancer Maternal Aunt     No Known Problems Maternal Uncle     No Known Problems Paternal Aunt     No Known Problems Paternal Uncle     Glaucoma Neg Hx     Blindness Neg Hx     Celiac disease Neg Hx     Colon cancer Neg Hx     Colon polyps Neg Hx     Esophageal cancer Neg Hx     Inflammatory bowel disease Neg Hx     Liver disease Neg Hx     Rectal cancer Neg Hx     Stomach cancer Neg Hx     Ulcerative colitis Neg Hx     Melanoma Neg Hx     Multiple sclerosis Neg Hx     Psoriasis Neg Hx     Amblyopia Neg Hx     Cataracts Neg Hx     Macular degeneration Neg Hx  "    Retinal detachment Neg Hx     Strabismus Neg Hx     Stroke Neg Hx     Thyroid disease Neg Hx        Review of patient's allergies indicates:   Allergen Reactions    Crestor [rosuvastatin]      Cramping in legs    Ezetimibe Diarrhea     Other reaction(s): abdominal pain, Diarrhea      Hydrocodone Itching    Lisinopril      Other reaction(s): cough      Sulfa (sulfonamide antibiotics) Itching and Rash           Sulfamethoxazole     Sulfamethoxazole-trimethoprim     Valsartan Swelling       Current Outpatient Medications   Medication Sig    ACCU-CHEK SMARTVIEW TEST STRIP Strp TEST THREE TIMES DAILY    albuterol (VENTOLIN HFA) 90 mcg/actuation inhaler Inhale 2 puffs by mouth into the lungs every 4 (four) hours as needed for Wheezing. Rescue    allopurinol (ZYLOPRIM) 100 MG tablet Take 1 tablet (100 mg total) by mouth once daily.    amLODIPine (NORVASC) 5 MG tablet Take 1 tablet (5 mg total) by mouth once daily.    aspirin (ECOTRIN) 81 MG EC tablet Take 1 tablet by mouth Daily.    atorvastatin (LIPITOR) 40 MG tablet Take 1 tablet (40 mg total) by mouth once daily.    BD ALCOHOL SWABS PadM USE  WHEN  TESTING BLOOD SUGAR FOUR TIMES DAILY    BD ULTRA-FINE OLU PEN NEEDLES 32 x 5/32 " Ndle Uses 4 times daily, on multiple daily insulin injections    blood sugar diagnostic Strp One Touch verio test strips - check blood sugar three times daily    blood-glucose meter kit Use as instructed    carbamide peroxide (EAR WAX REMOVAL DROPS OTIC)     carvediloL (COREG) 25 MG tablet Take 1 tablet (25 mg total) by mouth 2 (two) times daily.    cholecalciferol, vitamin D3, (DIALYVITE VITAMIN D) 5,000 unit capsule Take 5,000 Units by mouth once daily.    DULoxetine (CYMBALTA) 60 MG capsule Take 1 capsule (60 mg total) by mouth 2 (two) times daily.    furosemide (LASIX) 40 MG tablet TAKE 1 TABLET BY MOUTH TWICE DAILY    insulin (LANTUS SOLOSTAR U-100 INSULIN) glargine 100 units/mL (3mL) SubQ pen Inject 30 " "Units into the skin 2 (two) times a day.    insulin aspart U-100 (NOVOLOG) 100 unit/mL (3 mL) InPn pen Inject 24 Units into the skin 3 (three) times daily with meals.    lancets (ACCU-CHEK FASTCLIX LANCING DEV) Misc Lancets and device = check blood sugar 4 times daily    linaGLIPtin (TRADJENTA) 5 mg Tab tablet Take 1 tablet (5 mg total) by mouth once daily.    loratadine (CLARITIN) 10 mg tablet Take 1 tablet (10 mg total) by mouth once daily.    oxyCODONE-acetaminophen (PERCOCET)  mg per tablet Take 1 tablet by mouth 5 (five) times daily.    pantoprazole (PROTONIX) 40 MG tablet TAKE 1 TABLET(40 MG) BY MOUTH twice daily    pen needle, diabetic (SURE-FINE PEN NEEDLES) 29 gauge x 1/2" Ndle Use 4 times daily    rOPINIRole (REQUIP) 0.5 MG tablet TAKE 1 TABLET (0.5 MG TOTAL) BY MOUTH EVERY EVENING.    TRUEPLUS LANCETS 33 gauge Misc TEST BLOOD SUGAR FOUR TIMES DAILY    azelastine (ASTELIN) 137 mcg (0.1 %) nasal spray Spray 1 spray (137 mcg total) by Nasal route 2 (two) times daily.    fluticasone (FLONASE) 50 mcg/actuation nasal spray SHAKE LQ AND U 2 SPRAYS IEN QD (Patient not taking: Reported on 9/23/2020)    meclizine (ANTIVERT) 12.5 mg tablet Take 1 tablet (12.5 mg total) by mouth 3 (three) times daily as needed for Dizziness. (Patient not taking: Reported on 9/11/2020)    olopatadine (PATADAY) 0.2 % Drop Place 1 drop into both eyes once daily.    triamcinolone acetonide 0.1% (KENALOG) 0.1 % cream Apply topically 3 (three) times daily.     No current facility-administered medications for this visit.      Facility-Administered Medications Ordered in Other Visits   Medication    tetracaine HCl (PF) 0.5 % Drop 1 drop       REVIEW OF SYSTEMS:    GENERAL:  No weight loss, malaise or fevers.  HEENT:  Negative for frequent or significant headaches.  NECK:  Negative for lumps, goiter, pain and significant neck swelling.  RESPIRATORY:  Negative for cough, wheezing or shortness of breath. " "MARLON.  CARDIOVASCULAR:  Negative for chest pain, leg swelling or palpitations.  CAD and CHF.  GI:  Negative for abdominal discomfort, blood in stools or black stools or change in bowel habits. GERD.  MUSCULOSKELETAL:  Lower back and joint pain  SKIN:  Negative for lesions, rash, and itching.  PSYCH:  Negative for sleep disturbance, mood disorder and recent psychosocial stressors.  HEMATOLOGY/LYMPHOLOGY:  Negative for prolonged bleeding, bruising easily or swollen nodes. Anemia.  NEURO:   No history of headaches, syncope, paralysis, seizures or tremors.  All other reviewed and negative other than HPI.    OBJECTIVE:    BP (!) 146/71   Pulse 72   Temp 98 °F (36.7 °C)   Resp 18   Ht 5' 8" (1.727 m)   Wt 99 kg (218 lb 4.1 oz)   LMP  (LMP Unknown)   SpO2 100%   BMI 33.19 kg/m²     PHYSICAL EXAMINATION:    General appearance: Well appearing, in no acute distress, alert and oriented x3.  Psych:  Mood and affect appropriate.  Skin: Skin color, texture, turgor normal, no rashes or lesions, in both upper and lower body.  Head/face:  Normocephalic, atraumatic. No palpable lymph nodes.  Neck: There is pain to palpation over cervical paraspinals bilaterally. Spurling Negative bilaterally. Limited ROM with pain on flexion, extension, and lateral rotation.  Cor: RRR  Pulm: CTA  GI:  Soft and non-tender.  Back: Straight leg raise in the sitting position is negative for radicular pain bilaterally. Limited ROM with pain on extension. Positive facet loading bilaterally.   Extremities: Peripheral joint ROM is full and pain free without obvious instability or laxity in all four extremities. No deformities, edema, or skin discoloration. Good capillary refill.  Musculoskeletal: Bilateral upper and lower extremity strength is normal and symmetric.  No atrophy or tone abnormalities are noted.  Neuro: Bilateral upper and lower extremity coordination and muscle stretch reflexes are physiologic and symmetric.  Plantar response are " downgoing. + decreased sensation to C8 distribution to the right. (-) Nichols   Gait: Antalgic- aided with cane.    ASSESSMENT: 80 y.o. year old female with chronic lower back pain and leg pain consistent with the following diagnoses:    1. Lumbar radiculopathy  Ambulatory referral/consult to Physical/Occupational Therapy   2. Lumbar spondylosis     3. Spinal stenosis of lumbar region with neurogenic claudication     4. DDD (degenerative disc disease), lumbar     5. Cervical spondylosis     6. DDD (degenerative disc disease), cervical     7. Myofascial pain     8. Chronic pain disorder           PLAN:     - Previous imaging was reviewed and discussed with the patient today.    - She is s/p bilateral L4/5 TF JANEE with benefit. We can repeat this as needed.     - Consider lumbar MBB in the future.     - If her neck pain worsens, we can proceed with C4,5,6 RFA one side at a time. She had significant relief with MBB.    - I have stressed the importance of physical activity and a home exercise plan to help with pain and improve health.     - Consult physical therapy.     - RTC in 6 weeks.     - Counseled patient regarding the importance of physical therapy.    - Dr. Ravi was consulted on the patient and agrees with this plan.    The above plan and management options were discussed at length with patient. Patient is in agreement with the above and verbalized understanding.     Clover Kirby  10/16/2020

## 2020-10-21 NOTE — PROGRESS NOTES
Subjective:      Patient ID: Henrietta Villasenor is a 80 y.o. female.    Chief Complaint:  Diabetes    History of Present Illness  Henrietta Villasenor is here for follow up of T2DM, hyperpara, vit D def, osteopenia, thyroid nodule.  Previously seen by me 9/23/2020.    In the interim, patient had steroid injections in her back.    With regards to diabetes:    Diagnosed:  Late 1990s  Known complications:  DKA -  RN +  PN +  Nephropathy +  CAD +    Current regimen:  Lantus 30units BID  Novolog 24units TIDAC   Tradjenta 5mg daily.    Other medications tried:  MFM - CKD  SGLT2i - refused    Glucose Monitor:   2-4 times a day testing  Log reviewed:           Patient has diabetes mellitus and manages diabetes with an intensive insulin regimen. The patient requires a therapeutic CGM and is willing to use therapeutic CGM for the necesary frequent adjustments of insulin therapy. Patient has been using SMBG for frequent glucose monitoring (4+ times daily). I have completed an in-person visit during the previous 6 months and will continue to have in-person visits every 6 months to assess adherence to their CGM regimen and diabetes treatment plan.    Hypoglycemia:  Denies.  Knows how to correct with 15 grams of carbs- juice, coke, or a peppermint.     Diet/Exercise:  Eats 2-3 meals a day.   B: coffee, oatmeal, toast   L: banana, bread, diet coke  D: meat/ red beans, rice  Snacks : granola bar  Drinks : water and diet coke.   Exercise: None.       Education - last visit: 2018  Eye Exam: 1/2020  Podiatry: 6/2020    Denies history of pancreatitis & personal/family history of medullary thyroid cancer.     Diabetes Management Status    Hemoglobin A1C   Date Value Ref Range Status   06/19/2020 10.8 (H) 4.0 - 5.6 % Final     Comment:     ADA Screening Guidelines:  5.7-6.4%  Consistent with prediabetes  >or=6.5%  Consistent with diabetes  High levels of fetal hemoglobin interfere with the HbA1C  assay. Heterozygous hemoglobin variants (HbS,  HgC, etc)do  not significantly interfere with this assay.   However, presence of multiple variants may affect accuracy.     03/11/2020 10.5 (H) 4.0 - 5.6 % Final     Comment:     ADA Screening Guidelines:  5.7-6.4%  Consistent with prediabetes  >or=6.5%  Consistent with diabetes  High levels of fetal hemoglobin interfere with the HbA1C  assay. Heterozygous hemoglobin variants (HbS, HgC, etc)do  not significantly interfere with this assay.   However, presence of multiple variants may affect accuracy.     11/18/2019 9.0 (H) 4.0 - 5.6 % Final     Comment:     ADA Screening Guidelines:  5.7-6.4%  Consistent with prediabetes  >or=6.5%  Consistent with diabetes  High levels of fetal hemoglobin interfere with the HbA1C  assay. Heterozygous hemoglobin variants (HbS, HgC, etc)do  not significantly interfere with this assay.   However, presence of multiple variants may affect accuracy.         Statin: Taking  ACE/ARB: Not taking  Screening or Prevention Patient's value Goal Complete/Controlled?   HgA1C Testing and Control   Lab Results   Component Value Date    HGBA1C 10.8 (H) 06/19/2020      Annually/Less than 8% No   Lipid profile : 02/20/2020 Annually Yes   LDL control Lab Results   Component Value Date    LDLCALC 95.0 02/20/2020    Annually/Less than 100 mg/dl  Yes   Nephropathy screening Lab Results   Component Value Date    LABMICR 41.0 05/26/2017     Lab Results   Component Value Date    PROTEINUA Negative 01/02/2020    Annually Yes   Blood pressure BP Readings from Last 1 Encounters:   10/22/20 (!) 140/82    Less than 140/90 No   Dilated retinal exam : 01/27/2020 Annually Yes   Foot exam   : 06/09/2020 Annually No     With regards to the hyperparathyroidism:    Secondary to renal.     Lab Results   Component Value Date    .0 (H) 08/08/2017    CALCIUM 9.1 06/19/2020    PHOS 3.9 01/19/2018     Lab Results   Component Value Date    ALBUMIN 3.4 (L) 06/19/2020     With regards to Vitamin D Deficiency:    Vit D,  25-Hydroxy   Date Value Ref Range Status   06/19/2020 64 30 - 96 ng/mL Final     Comment:     Vitamin D deficiency.........<10 ng/mL                              Vitamin D insufficiency......10-29 ng/mL       Vitamin D sufficiency........> or equal to 30 ng/mL  Vitamin D toxicity............>100 ng/mL       Current Meds: OTC VIt D3 1000iu daily    With regards to osteopenia:    BMD:   7/9/2020  The L1 to L4 vertebral bone mineral density is equal to 1.215 g/cm squared with a T score of 0.2.  There has been a -3.2% statistically significant change relative to the prior study.  The left femoral neck bone mineral density is equal to 0.861 g/cm squared with a T score of -1.3.  The total hip bone mineral density is equal to 0.928 g/cm squared with a T score of -0.6.  There is a 5.4% risk of a major osteoporotic fracture and a 1.1% risk of hip fracture in the next 10 years (FRAX).  Impression:  Osteopenia    Fractures : denies    Calcium : None  Vitamin D : OTC Vit D3 1000iu daily     With regards to thyroid nodule:    Thyroid US:   7/9/2020  Right lobe:  Echogenicity: Homogeneous  Measures: 4 cm x 1.4 cm x 1.6 cm.  Lymph nodes: No lymph nodes identified at this time.  Left lobe:  Echogenicity: Homogeneous  Measures: 3.7 cm x 1.1 cm x 1.4 cm.  Lymph nodes: No lymph nodes are identified at this time.  Multiple bilateral thyroid nodules are present, with 3 index nodules as detailed below.  Nodule #1  Size:  cm  Location: Right lobe upper pole  Composition: solid/almost completely solid (2)  Echogenicity: very hypoechoic (3)  Shape: wider-than-tall (0)  Margins: smooth/ill-defined (0)  Echogenic foci: none (0)  Change: No  TI-RADS category: TR4 (5 points) - follow up is recommended at 1, 2, 3, and 5 years if 1 cm or greater; FNA is recommended if 1.5 cm or greater.  Nodule #2  Size: 1.5 cm x 7 mm x 1.2 cm  Location: Isthmus  Composition: solid/almost completely solid (2)  Echogenicity: very hypoechoic (3)  Shape:  "wider-than-tall (0)  Margins: smooth/ill-defined (0)  Echogenic foci: none (0)  Change: No  TI-RADS category: TR4 (5 points) - follow up is recommended at 1, 2, 3, and 5 years if 1 cm or greater; FNA is recommended if 1.5 cm or greater.  Nodule #3  Size: 0.8 cm x 0.5 cm x 0.5 cm  Location: Right lobe upper pole  Composition: cystic/almost completely cystic (0)   Echogenicity: anechoic (0)  Shape: wider-than-tall (0)  Margins: smooth (0)  Echogenic foci: none (0)  Change: No  TI-RADS category: TR1 (0 points) - no follow up required   Impression:  The thyroid gland is stable in appearance for roughly 9 years.  For this reason, no further follow-up is recommended for these nodules.    FNA: None    Signs or Symptoms:   Difficulty breathing: Denies  Difficulty swallowing: Luis E  Voice Changes: Denies  FH of thyroid cancer: Denies  Personal history of radiation treatment or exposure: Denies    Lab Results   Component Value Date    TSH 2.676 06/19/2020    FREET4 0.82 04/01/2009     Denies signs or symptoms of hyper or hypothyroidism    Review of Systems   Constitutional: Negative for fatigue.   Eyes: Negative for visual disturbance.   Respiratory: Negative for shortness of breath.    Cardiovascular: Negative for chest pain.   Gastrointestinal: Negative for abdominal pain.   Musculoskeletal: Positive for back pain. Negative for arthralgias.   Skin: Negative for wound.   Neurological: Negative for headaches.   Hematological: Does not bruise/bleed easily.   Psychiatric/Behavioral: Negative for sleep disturbance.     Objective:   Physical Exam  Vitals signs reviewed.   Neck:      Thyroid: No thyromegaly (irregular shaped gland).   Cardiovascular:      Rate and Rhythm: Normal rate.      Comments: No edema present  Pulmonary:      Effort: Pulmonary effort is normal.   Abdominal:      Palpations: Abdomen is soft.       Visit Vitals  BP (!) 140/82   Pulse 79   Ht 5' 8" (1.727 m)   Wt 101.4 kg (223 lb 7 oz)   LMP  (LMP Unknown) "   BMI 33.97 kg/m²     Appropriate footwear, foot exam deferred, done 6/2020.  Injection sites are without edema or erythema. No lipo hypertropthy or atrophy.      Body mass index is 33.97 kg/m².    Lab Review:   Lab Results   Component Value Date    HGBA1C 10.8 (H) 06/19/2020    HGBA1C 10.5 (H) 03/11/2020    HGBA1C 9.0 (H) 11/18/2019       Lab Results   Component Value Date    CHOL 175 02/20/2020    HDL 52 02/20/2020    LDLCALC 95.0 02/20/2020    TRIG 140 02/20/2020    CHOLHDL 29.7 02/20/2020     Lab Results   Component Value Date     06/19/2020    K 3.6 06/19/2020     06/19/2020    CO2 30 (H) 06/19/2020     (H) 06/19/2020    BUN 20 06/19/2020    CREATININE 1.4 06/19/2020    CALCIUM 9.1 06/19/2020    PROT 7.7 06/19/2020    ALBUMIN 3.4 (L) 06/19/2020    BILITOT 0.3 06/19/2020    ALKPHOS 87 06/19/2020    AST 26 06/19/2020    ALT 19 06/19/2020    ANIONGAP 11 06/19/2020    ESTGFRAFRICA 40.9 (A) 06/19/2020    EGFRNONAA 35.5 (A) 06/19/2020    TSH 2.676 06/19/2020     Vit D, 25-Hydroxy   Date Value Ref Range Status   06/19/2020 64 30 - 96 ng/mL Final     Comment:     Vitamin D deficiency.........<10 ng/mL                              Vitamin D insufficiency......10-29 ng/mL       Vitamin D sufficiency........> or equal to 30 ng/mL  Vitamin D toxicity............>100 ng/mL       Assessment and Plan     1. Type 2 diabetes mellitus with diabetic polyneuropathy, with long-term current use of insulin  Hemoglobin A1C    Comprehensive Metabolic Panel   2. Thyroid nodule     3. Hyperparathyroidism     4. Hypovitaminosis D     5. Osteopenia, unspecified location     6. Stage 3 chronic kidney disease, unspecified whether stage 3a or 3b CKD     7. Coronary artery disease involving native coronary artery of native heart with angina pectoris     8. Essential hypertension     9. Hypercholesterolemia     10. Diabetic polyneuropathy associated with type 2 diabetes mellitus     11. Obesity, diabetes, and hypertension  syndrome       Type 2 diabetes mellitus with diabetic polyneuropathy, with long-term current use of insulin  -- Labs today.  -- A1c goal 7.5-8.0%.  -- Medications discussed:  MFM - discontinued due to CKD in the past  DPP4  SGLT2 - refused  Insulin   -- Reviewed logs/CGM:  Significant glucose variability.   Instructed to check glucose premeal and document.  Instructed to send glucose logs in 14 days.  Reach out to me sooner for any glucose <70 or consistently >200.  -- Professional CGM done but never returned. Gave it to me today. Will download and make recommendations based on report.  -- Dexcom AOB sent.    -- Medication Changes:   CONTINUE:  Lantus 30units BID  Novolog 24units TIDAC   Tradjenta 5mg daily.   -- Reviewed goals of therapy are to get the best control we can without hypoglycemia.  -- Reviewed patient's current insulin regimen. Clarified proper insulin dose and timing in relation to meals, etc. Insulin injection sites and proper rotation instructed.    -- Advised frequent self blood glucose monitoring.  Patient encouraged to document glucose results and bring them to every clinic visit.  -- Hypoglycemia precautions discussed. Instructed on precautions before driving.    -- Call for Bg repeatedly < 90 or > 180.   -- Close adherence to lifestyle changes recommended.   -- Periodic follow ups for eye evaluations, foot care and dental care suggested.    Thyroid nodule  -- Thyroid US stable - no recommendations to FNA.  -- TFTs unremarkable.  -- Repeat US if clinical change.    Hyperparathyroidism  -- Secondary to renal. Calcium unremarkable.     Hypovitaminosis D  -- On Vit D3 1000iu daily.    Osteopenia  -- Schedule BMD 7/2022.  -- Reassuring she is not fracturing.     Stage 3 chronic kidney disease  -- Lost to follow up with nephrology. Refer back.     Coronary artery disease involving native coronary artery of native heart with angina pectoris  -- Optimize glucose control.     Essential hypertension  --  Controlled on current medications.     Hypercholesterolemia  -- Controlled.  -- On statin per ADA recommendations.    Diabetic neuropathy associated with type 2 diabetes mellitus  -- Optimize glucose control.     Obesity, diabetes, and hypertension syndrome  -- Encouraged dietary and lifestyle modifications.  -- Emphasized weight loss goals.    Follow up in about 3 months (around 1/22/2021).

## 2020-10-22 ENCOUNTER — LAB VISIT (OUTPATIENT)
Dept: LAB | Facility: HOSPITAL | Age: 80
End: 2020-10-22
Payer: MEDICARE

## 2020-10-22 ENCOUNTER — OFFICE VISIT (OUTPATIENT)
Dept: ENDOCRINOLOGY | Facility: CLINIC | Age: 80
End: 2020-10-22
Payer: MEDICARE

## 2020-10-22 VITALS
HEIGHT: 68 IN | BODY MASS INDEX: 33.86 KG/M2 | HEART RATE: 79 BPM | DIASTOLIC BLOOD PRESSURE: 82 MMHG | SYSTOLIC BLOOD PRESSURE: 140 MMHG | WEIGHT: 223.44 LBS

## 2020-10-22 DIAGNOSIS — E11.42 DIABETIC POLYNEUROPATHY ASSOCIATED WITH TYPE 2 DIABETES MELLITUS: ICD-10-CM

## 2020-10-22 DIAGNOSIS — E11.69 OBESITY, DIABETES, AND HYPERTENSION SYNDROME: ICD-10-CM

## 2020-10-22 DIAGNOSIS — I10 ESSENTIAL HYPERTENSION: Chronic | ICD-10-CM

## 2020-10-22 DIAGNOSIS — M85.80 OSTEOPENIA, UNSPECIFIED LOCATION: ICD-10-CM

## 2020-10-22 DIAGNOSIS — E66.9 OBESITY, DIABETES, AND HYPERTENSION SYNDROME: ICD-10-CM

## 2020-10-22 DIAGNOSIS — E04.1 THYROID NODULE: ICD-10-CM

## 2020-10-22 DIAGNOSIS — Z79.4 TYPE 2 DIABETES MELLITUS WITH DIABETIC POLYNEUROPATHY, WITH LONG-TERM CURRENT USE OF INSULIN: Primary | ICD-10-CM

## 2020-10-22 DIAGNOSIS — N18.30 STAGE 3 CHRONIC KIDNEY DISEASE, UNSPECIFIED WHETHER STAGE 3A OR 3B CKD: ICD-10-CM

## 2020-10-22 DIAGNOSIS — E55.9 HYPOVITAMINOSIS D: ICD-10-CM

## 2020-10-22 DIAGNOSIS — E11.59 OBESITY, DIABETES, AND HYPERTENSION SYNDROME: ICD-10-CM

## 2020-10-22 DIAGNOSIS — I15.2 OBESITY, DIABETES, AND HYPERTENSION SYNDROME: ICD-10-CM

## 2020-10-22 DIAGNOSIS — I25.119 CORONARY ARTERY DISEASE INVOLVING NATIVE CORONARY ARTERY OF NATIVE HEART WITH ANGINA PECTORIS: ICD-10-CM

## 2020-10-22 DIAGNOSIS — E11.42 TYPE 2 DIABETES MELLITUS WITH DIABETIC POLYNEUROPATHY, WITH LONG-TERM CURRENT USE OF INSULIN: Primary | ICD-10-CM

## 2020-10-22 DIAGNOSIS — E21.3 HYPERPARATHYROIDISM: ICD-10-CM

## 2020-10-22 DIAGNOSIS — E78.00 HYPERCHOLESTEROLEMIA: ICD-10-CM

## 2020-10-22 DIAGNOSIS — Z79.4 TYPE 2 DIABETES MELLITUS WITH DIABETIC POLYNEUROPATHY, WITH LONG-TERM CURRENT USE OF INSULIN: ICD-10-CM

## 2020-10-22 DIAGNOSIS — E11.42 TYPE 2 DIABETES MELLITUS WITH DIABETIC POLYNEUROPATHY, WITH LONG-TERM CURRENT USE OF INSULIN: ICD-10-CM

## 2020-10-22 LAB
ALBUMIN SERPL BCP-MCNC: 3.4 G/DL (ref 3.5–5.2)
ALP SERPL-CCNC: 96 U/L (ref 55–135)
ALT SERPL W/O P-5'-P-CCNC: 13 U/L (ref 10–44)
ANION GAP SERPL CALC-SCNC: 14 MMOL/L (ref 8–16)
AST SERPL-CCNC: 20 U/L (ref 10–40)
BILIRUB SERPL-MCNC: 0.5 MG/DL (ref 0.1–1)
BUN SERPL-MCNC: 11 MG/DL (ref 8–23)
CALCIUM SERPL-MCNC: 9.6 MG/DL (ref 8.7–10.5)
CHLORIDE SERPL-SCNC: 99 MMOL/L (ref 95–110)
CO2 SERPL-SCNC: 30 MMOL/L (ref 23–29)
CREAT SERPL-MCNC: 1.3 MG/DL (ref 0.5–1.4)
EST. GFR  (AFRICAN AMERICAN): 44.8 ML/MIN/1.73 M^2
EST. GFR  (NON AFRICAN AMERICAN): 38.8 ML/MIN/1.73 M^2
ESTIMATED AVG GLUCOSE: 220 MG/DL (ref 68–131)
GLUCOSE SERPL-MCNC: 125 MG/DL (ref 70–110)
HBA1C MFR BLD HPLC: 9.3 % (ref 4–5.6)
POTASSIUM SERPL-SCNC: 3.5 MMOL/L (ref 3.5–5.1)
PROT SERPL-MCNC: 7.9 G/DL (ref 6–8.4)
SODIUM SERPL-SCNC: 143 MMOL/L (ref 136–145)

## 2020-10-22 PROCEDURE — 99214 PR OFFICE/OUTPT VISIT, EST, LEVL IV, 30-39 MIN: ICD-10-PCS | Mod: HCNC,S$GLB,, | Performed by: NURSE PRACTITIONER

## 2020-10-22 PROCEDURE — 1159F PR MEDICATION LIST DOCUMENTED IN MEDICAL RECORD: ICD-10-PCS | Mod: HCNC,S$GLB,, | Performed by: NURSE PRACTITIONER

## 2020-10-22 PROCEDURE — 1126F PR PAIN SEVERITY QUANTIFIED, NO PAIN PRESENT: ICD-10-PCS | Mod: HCNC,S$GLB,, | Performed by: NURSE PRACTITIONER

## 2020-10-22 PROCEDURE — 36415 COLL VENOUS BLD VENIPUNCTURE: CPT | Mod: HCNC

## 2020-10-22 PROCEDURE — 1101F PT FALLS ASSESS-DOCD LE1/YR: CPT | Mod: HCNC,CPTII,S$GLB, | Performed by: NURSE PRACTITIONER

## 2020-10-22 PROCEDURE — 99499 RISK ADDL DX/OHS AUDIT: ICD-10-PCS | Mod: S$GLB,,, | Performed by: NURSE PRACTITIONER

## 2020-10-22 PROCEDURE — 1159F MED LIST DOCD IN RCRD: CPT | Mod: HCNC,S$GLB,, | Performed by: NURSE PRACTITIONER

## 2020-10-22 PROCEDURE — 3077F PR MOST RECENT SYSTOLIC BLOOD PRESSURE >= 140 MM HG: ICD-10-PCS | Mod: HCNC,CPTII,S$GLB, | Performed by: NURSE PRACTITIONER

## 2020-10-22 PROCEDURE — 3079F PR MOST RECENT DIASTOLIC BLOOD PRESSURE 80-89 MM HG: ICD-10-PCS | Mod: HCNC,CPTII,S$GLB, | Performed by: NURSE PRACTITIONER

## 2020-10-22 PROCEDURE — 1101F PR PT FALLS ASSESS DOC 0-1 FALLS W/OUT INJ PAST YR: ICD-10-PCS | Mod: HCNC,CPTII,S$GLB, | Performed by: NURSE PRACTITIONER

## 2020-10-22 PROCEDURE — 80053 COMPREHEN METABOLIC PANEL: CPT | Mod: HCNC

## 2020-10-22 PROCEDURE — 3077F SYST BP >= 140 MM HG: CPT | Mod: HCNC,CPTII,S$GLB, | Performed by: NURSE PRACTITIONER

## 2020-10-22 PROCEDURE — 3079F DIAST BP 80-89 MM HG: CPT | Mod: HCNC,CPTII,S$GLB, | Performed by: NURSE PRACTITIONER

## 2020-10-22 PROCEDURE — 99214 OFFICE O/P EST MOD 30 MIN: CPT | Mod: HCNC,S$GLB,, | Performed by: NURSE PRACTITIONER

## 2020-10-22 PROCEDURE — 83036 HEMOGLOBIN GLYCOSYLATED A1C: CPT | Mod: HCNC

## 2020-10-22 PROCEDURE — 99999 PR PBB SHADOW E&M-EST. PATIENT-LVL V: CPT | Mod: PBBFAC,HCNC,, | Performed by: NURSE PRACTITIONER

## 2020-10-22 PROCEDURE — 1126F AMNT PAIN NOTED NONE PRSNT: CPT | Mod: HCNC,S$GLB,, | Performed by: NURSE PRACTITIONER

## 2020-10-22 PROCEDURE — 99999 PR PBB SHADOW E&M-EST. PATIENT-LVL V: ICD-10-PCS | Mod: PBBFAC,HCNC,, | Performed by: NURSE PRACTITIONER

## 2020-10-22 PROCEDURE — 99499 UNLISTED E&M SERVICE: CPT | Mod: S$GLB,,, | Performed by: NURSE PRACTITIONER

## 2020-10-22 NOTE — ASSESSMENT & PLAN NOTE
-- Labs today.  -- A1c goal 7.5-8.0%.  -- Medications discussed:  MFM - discontinued due to CKD in the past  DPP4  SGLT2 - refused  Insulin   -- Reviewed logs/CGM:  Significant glucose variability.   Instructed to check glucose premeal and document.  Instructed to send glucose logs in 14 days.  Reach out to me sooner for any glucose <70 or consistently >200.  -- Professional CGM done but never returned. Gave it to me today. Will download and make recommendations based on report.  -- Dexcom AOB sent.    -- Medication Changes:   CONTINUE:  Lantus 30units BID  Novolog 24units TIDAC   Tradjenta 5mg daily.   -- Reviewed goals of therapy are to get the best control we can without hypoglycemia.  -- Reviewed patient's current insulin regimen. Clarified proper insulin dose and timing in relation to meals, etc. Insulin injection sites and proper rotation instructed.    -- Advised frequent self blood glucose monitoring.  Patient encouraged to document glucose results and bring them to every clinic visit.  -- Hypoglycemia precautions discussed. Instructed on precautions before driving.    -- Call for Bg repeatedly < 90 or > 180.   -- Close adherence to lifestyle changes recommended.   -- Periodic follow ups for eye evaluations, foot care and dental care suggested.

## 2020-10-23 ENCOUNTER — TELEPHONE (OUTPATIENT)
Dept: ENDOCRINOLOGY | Facility: CLINIC | Age: 80
End: 2020-10-23

## 2020-10-23 NOTE — TELEPHONE ENCOUNTER
----- Message from Niya Flores NP sent at 10/23/2020  7:05 AM CDT -----  Please make sure her CGM gets uploaded and printed.  Thanks

## 2020-10-26 NOTE — PROGRESS NOTES
DIABETES EDUCATOR NOTE   Return of the Freestyle Susie Pro Sensor and Patient Log.    Patient returned to clinic today to return Glucose Sensor and signed patient log form used in [facility] procedure.    The CGMS Sensor will be scanned and downloaded. All reports will be imported into the patient's electronic medical record.    Endocrine Provider will complete data interpretation and make recommendations; will forward recommendations to the ordering provider for follow up with patient.

## 2020-10-27 ENCOUNTER — PATIENT MESSAGE (OUTPATIENT)
Dept: ENDOCRINOLOGY | Facility: CLINIC | Age: 80
End: 2020-10-27

## 2020-10-27 DIAGNOSIS — E11.42 DIABETIC POLYNEUROPATHY ASSOCIATED WITH TYPE 2 DIABETES MELLITUS: ICD-10-CM

## 2020-10-27 RX ORDER — INSULIN ASPART 100 [IU]/ML
INJECTION, SOLUTION INTRAVENOUS; SUBCUTANEOUS
Qty: 120 ML | Refills: 3 | Status: SHIPPED | OUTPATIENT
Start: 2020-10-27 | End: 2022-01-10 | Stop reason: SDUPTHER

## 2020-10-27 NOTE — TELEPHONE ENCOUNTER
Reviewed CGM report with the patient and discussed recommendations.    Tradjenta 5mg daily   Lantus 30units BID   Novolog 24units with breakfast   Novolog 28units with lunch and dinner     Instructed to send glucose logs in 14 days.  Reach out to me sooner for any glucose <70 or consistently >200.    All questions answered.  She verbalized understanding.

## 2020-10-30 ENCOUNTER — PATIENT OUTREACH (OUTPATIENT)
Dept: ADMINISTRATIVE | Facility: HOSPITAL | Age: 80
End: 2020-10-30

## 2020-11-09 RX ORDER — OXYCODONE AND ACETAMINOPHEN 10; 325 MG/1; MG/1
1 TABLET ORAL
Qty: 150 TABLET | Refills: 0 | Status: SHIPPED | OUTPATIENT
Start: 2020-11-09 | End: 2020-11-13 | Stop reason: SDUPTHER

## 2020-11-09 NOTE — TELEPHONE ENCOUNTER
----- Message from Charisse Manley sent at 11/9/2020 12:00 PM CST -----  Contact: [phone]  Patient called to follow up on the refill request for oxyCODONE-acetaminophen (PERCOCET)  mg per tablet. Please call and advise

## 2020-11-13 ENCOUNTER — OFFICE VISIT (OUTPATIENT)
Dept: INTERNAL MEDICINE | Facility: CLINIC | Age: 80
End: 2020-11-13
Payer: MEDICARE

## 2020-11-13 VITALS
SYSTOLIC BLOOD PRESSURE: 130 MMHG | HEART RATE: 72 BPM | WEIGHT: 226.19 LBS | HEIGHT: 68 IN | DIASTOLIC BLOOD PRESSURE: 70 MMHG | BODY MASS INDEX: 34.28 KG/M2 | OXYGEN SATURATION: 97 %

## 2020-11-13 DIAGNOSIS — I50.32 CHRONIC DIASTOLIC CONGESTIVE HEART FAILURE: ICD-10-CM

## 2020-11-13 DIAGNOSIS — R07.9 CHEST PAIN, UNSPECIFIED TYPE: ICD-10-CM

## 2020-11-13 DIAGNOSIS — R41.3 MEMORY LOSS: ICD-10-CM

## 2020-11-13 DIAGNOSIS — E13.9 DIABETES MELLITUS DUE TO ABNORMAL INSULIN: Primary | ICD-10-CM

## 2020-11-13 DIAGNOSIS — E11.42 TYPE 2 DIABETES MELLITUS WITH DIABETIC POLYNEUROPATHY, WITH LONG-TERM CURRENT USE OF INSULIN: ICD-10-CM

## 2020-11-13 DIAGNOSIS — I10 ESSENTIAL HYPERTENSION: Chronic | ICD-10-CM

## 2020-11-13 DIAGNOSIS — G47.33 OSA ON CPAP: Chronic | ICD-10-CM

## 2020-11-13 DIAGNOSIS — R55 SYNCOPE AND COLLAPSE: ICD-10-CM

## 2020-11-13 DIAGNOSIS — I25.119 CORONARY ARTERY DISEASE INVOLVING NATIVE CORONARY ARTERY OF NATIVE HEART WITH ANGINA PECTORIS: ICD-10-CM

## 2020-11-13 DIAGNOSIS — E78.00 HYPERCHOLESTEROLEMIA: ICD-10-CM

## 2020-11-13 DIAGNOSIS — Z79.4 TYPE 2 DIABETES MELLITUS WITH DIABETIC POLYNEUROPATHY, WITH LONG-TERM CURRENT USE OF INSULIN: ICD-10-CM

## 2020-11-13 PROCEDURE — 1101F PT FALLS ASSESS-DOCD LE1/YR: CPT | Mod: HCNC,CPTII,S$GLB, | Performed by: INTERNAL MEDICINE

## 2020-11-13 PROCEDURE — 99999 PR PBB SHADOW E&M-EST. PATIENT-LVL V: CPT | Mod: PBBFAC,HCNC,, | Performed by: INTERNAL MEDICINE

## 2020-11-13 PROCEDURE — 3078F DIAST BP <80 MM HG: CPT | Mod: HCNC,CPTII,S$GLB, | Performed by: INTERNAL MEDICINE

## 2020-11-13 PROCEDURE — 3075F SYST BP GE 130 - 139MM HG: CPT | Mod: HCNC,CPTII,S$GLB, | Performed by: INTERNAL MEDICINE

## 2020-11-13 PROCEDURE — 1101F PR PT FALLS ASSESS DOC 0-1 FALLS W/OUT INJ PAST YR: ICD-10-PCS | Mod: HCNC,CPTII,S$GLB, | Performed by: INTERNAL MEDICINE

## 2020-11-13 PROCEDURE — 3288F PR FALLS RISK ASSESSMENT DOCUMENTED: ICD-10-PCS | Mod: HCNC,CPTII,S$GLB, | Performed by: INTERNAL MEDICINE

## 2020-11-13 PROCEDURE — 3075F PR MOST RECENT SYSTOLIC BLOOD PRESS GE 130-139MM HG: ICD-10-PCS | Mod: HCNC,CPTII,S$GLB, | Performed by: INTERNAL MEDICINE

## 2020-11-13 PROCEDURE — 1125F PR PAIN SEVERITY QUANTIFIED, PAIN PRESENT: ICD-10-PCS | Mod: HCNC,S$GLB,, | Performed by: INTERNAL MEDICINE

## 2020-11-13 PROCEDURE — 3072F PR LOW RISK FOR RETINOPATHY: ICD-10-PCS | Mod: HCNC,S$GLB,, | Performed by: INTERNAL MEDICINE

## 2020-11-13 PROCEDURE — 1125F AMNT PAIN NOTED PAIN PRSNT: CPT | Mod: HCNC,S$GLB,, | Performed by: INTERNAL MEDICINE

## 2020-11-13 PROCEDURE — 99999 PR PBB SHADOW E&M-EST. PATIENT-LVL V: ICD-10-PCS | Mod: PBBFAC,HCNC,, | Performed by: INTERNAL MEDICINE

## 2020-11-13 PROCEDURE — 3072F LOW RISK FOR RETINOPATHY: CPT | Mod: HCNC,S$GLB,, | Performed by: INTERNAL MEDICINE

## 2020-11-13 PROCEDURE — 3078F PR MOST RECENT DIASTOLIC BLOOD PRESSURE < 80 MM HG: ICD-10-PCS | Mod: HCNC,CPTII,S$GLB, | Performed by: INTERNAL MEDICINE

## 2020-11-13 PROCEDURE — 1159F PR MEDICATION LIST DOCUMENTED IN MEDICAL RECORD: ICD-10-PCS | Mod: HCNC,S$GLB,, | Performed by: INTERNAL MEDICINE

## 2020-11-13 PROCEDURE — 99214 PR OFFICE/OUTPT VISIT, EST, LEVL IV, 30-39 MIN: ICD-10-PCS | Mod: HCNC,S$GLB,, | Performed by: INTERNAL MEDICINE

## 2020-11-13 PROCEDURE — 1159F MED LIST DOCD IN RCRD: CPT | Mod: HCNC,S$GLB,, | Performed by: INTERNAL MEDICINE

## 2020-11-13 PROCEDURE — 99214 OFFICE O/P EST MOD 30 MIN: CPT | Mod: HCNC,S$GLB,, | Performed by: INTERNAL MEDICINE

## 2020-11-13 PROCEDURE — 3288F FALL RISK ASSESSMENT DOCD: CPT | Mod: HCNC,CPTII,S$GLB, | Performed by: INTERNAL MEDICINE

## 2020-11-13 RX ORDER — OXYCODONE AND ACETAMINOPHEN 10; 325 MG/1; MG/1
1 TABLET ORAL
Qty: 150 TABLET | Refills: 0 | Status: SHIPPED | OUTPATIENT
Start: 2021-01-11 | End: 2020-12-01 | Stop reason: SDUPTHER

## 2020-11-13 RX ORDER — OXYCODONE AND ACETAMINOPHEN 10; 325 MG/1; MG/1
1 TABLET ORAL
Qty: 150 TABLET | Refills: 0 | Status: SHIPPED | OUTPATIENT
Start: 2020-12-10 | End: 2020-12-08 | Stop reason: SDUPTHER

## 2020-11-13 RX ORDER — AMLODIPINE BESYLATE 5 MG/1
5 TABLET ORAL DAILY
Qty: 90 TABLET | Refills: 3 | Status: SHIPPED | OUTPATIENT
Start: 2020-11-13 | End: 2020-12-27

## 2020-11-13 RX ORDER — ALLOPURINOL 100 MG/1
100 TABLET ORAL DAILY
Qty: 90 TABLET | Refills: 3 | Status: SHIPPED | OUTPATIENT
Start: 2020-11-13 | End: 2020-12-25

## 2020-11-13 RX ORDER — PANTOPRAZOLE SODIUM 40 MG/1
TABLET, DELAYED RELEASE ORAL
Qty: 180 TABLET | Refills: 4 | Status: SHIPPED | OUTPATIENT
Start: 2020-11-13 | End: 2021-07-28 | Stop reason: SDUPTHER

## 2020-11-13 NOTE — PROGRESS NOTES
"CHIEF COMPLAINT: Annual exam     HISTORY OF PRESENT ILLNESS: This is a 80-year-old woman who presents for follow up of above.    She brings her pill bottles with her today.     She reports that her memory is not good. She reports that she is forgetful and admits to likely forgetting to take medications.      She had worsened neck pain. SHe went to pain management on 6/15/20 and she had some trigger point injections in the left neck and shoulder which helped. The pain in the neck is returned.    She continues to take oxycodone apap 10/325 5 times a day.  She continues to have back pain. She had an injection in her back on 10/2/20 She is also taking duloxetine 60 mg twice daliy. She take ropinirole 0.5 mg in the evening for restless leg.       She has had trouble swallowing.  She had a modified barium swallow with speech on 6/10/20. 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  Swallowing has been ok. She is not eating meat     SHe has not had abdominal pain, dizziness, syncope, lightheadedness.     She has a cough at night when she lies down She has been taking pantoprazole 40 mg twice daily.       She is taking  coreg 25 mg twice daily and lasix 40 mg 1 tablets twice daily for her hypertension and diastolic dysfunction. She does not have a prescription bottle of amlodipine 10 mg daily.     Left knee bothers her at times.  Right knee is fine now.      No chest pain, palpitations, shortness of breath.       She has gout. She does not have a bottle of allopurinol 100 mg with her today     She saw Endocrinology on 10/22/20. She is taking Trajenta 5 mg daily, Lantus 30 units twice daily and Novolog 24 units once or twice daily with meals.  She reports that her blood sugars have been "out of wack".  blood sugars have bene running 200 to 300's. Occasionally in the 100's     PAST MEDICAL HISTORY:   1. Diabetes mellitus   2. Hypertension   3. Hyperlipidemia   4. Left heart catheterization " "January 2007 which revealed luminal irregularities in the LAD, left circumflex and right coronary artery. She had diastolic dysfunction and patent renal arteries.   5. Sleep apnea   6. Obesity.   7. Nephrolithiasis status post lithotripsy.   8. Reflux - had nonerosive gastropathy on EGD September 2007. Gastritis on EGD 9/2010  9. Hidradenitis suppurativa.   10. History of diverticulitis with a hospitalized October 2007.   11. Migraines   12. Obesity.   13. 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    14. Total hysterectomy in 1969.   15. Cholecystectomy was done at that time as well.   16. 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   17. Colon polyps on colonscopy 11/2010 - due 2013   18. Hospitalization 12/10 for e coli sepsis due to left ureteral stone with left nephrostomy tube in [place], [place]   19. Left knee replacement 9/2012      MEDICATIONS and ALLERGIES: Updated on EPIC.     PHYSICAL EXAMINATION:     /70 (BP Location: Left arm, Patient Position: Sitting, BP Method: Large (Manual))   Pulse 72   Ht 5' 8" (1.727 m)   Wt 102.6 kg (226 lb 3.1 oz)   LMP  (LMP Unknown)   SpO2 97%   BMI 34.39 kg/m²       GENERAL: She is alert, oriented, no apparent distress. Affect within   normal limits.  Conjunctiva anicteric. . Oropharynx white tongue. TM clear   NECK: Supple.   Respiratory: Effort normal. Lungs clear  HEART: Regular rate and rhythm without murmurs, gallops or rubs.   No lower extremity edema.             ASSESSMENT AND PLAN:        1.  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 Gout - get back on allopurinol.   2.  Mood disorder - on duloxetine to 60 mg twice daily  3.  Neck, shoulder and back pain -follow up with [facility] - on oxycodone  4. Asthma - stable   6. HTN -stable. Get back on amlodipioen7. Gerd -on pantoprazole 40 mg   8. Diabetes with " neuropathy- watch sugars-   9. Hyperlipidemia - on  lipitor  10. Allergic rhinitis - stable  11. Diastolic congestive heart failure - stable  12. CRI -labs  13. Obesity - discussed diet, exercise and weight loss  14. CAD -risk factor modification  15. Aortic atherosclerosis and possible mesenteric artery stenosis- risk factor modification  16. Tortuous aorta - HTN controlled  17. Colon polyps - Colonoscopy in 8/2013 - 2 polyps. Flex sig 11/2013 - mild granular mucosa. Colonoscopy 6/2017 diverticulosis - no polyps due 2023.   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 .Restless leg syndrome - on roprinole    20.  Memory issues - case management referral.   .  MMG 6/20  Prevnar 10/15 and flu shot 11/16  I will see her back in 3 months sooner if issues. .

## 2020-11-23 ENCOUNTER — HOSPITAL ENCOUNTER (EMERGENCY)
Facility: HOSPITAL | Age: 80
Discharge: HOME | End: 2020-11-23
Attending: EMERGENCY MEDICINE
Payer: MEDICARE

## 2020-11-23 ENCOUNTER — OUTPATIENT CASE MANAGEMENT (OUTPATIENT)
Dept: ADMINISTRATIVE | Facility: OTHER | Age: 80
End: 2020-11-23

## 2020-11-23 VITALS
BODY MASS INDEX: 34.25 KG/M2 | SYSTOLIC BLOOD PRESSURE: 223 MMHG | HEIGHT: 68 IN | OXYGEN SATURATION: 97 % | WEIGHT: 226 LBS | HEART RATE: 77 BPM | DIASTOLIC BLOOD PRESSURE: 106 MMHG | TEMPERATURE: 98 F | RESPIRATION RATE: 16 BRPM

## 2020-11-23 DIAGNOSIS — M25.569 KNEE PAIN: ICD-10-CM

## 2020-11-23 DIAGNOSIS — M79.606 LEG PAIN: Primary | ICD-10-CM

## 2020-11-23 DIAGNOSIS — I10 ESSENTIAL HYPERTENSION: Chronic | ICD-10-CM

## 2020-11-23 LAB
BUN SERPL-MCNC: 11 MG/DL (ref 6–30)
CHLORIDE SERPL-SCNC: 98 MMOL/L (ref 95–110)
CK SERPL-CCNC: 120 U/L (ref 20–180)
CREAT SERPL-MCNC: 1 MG/DL (ref 0.5–1.4)
GLUCOSE SERPL-MCNC: 138 MG/DL (ref 70–110)
HCT VFR BLD CALC: 39 %PCV (ref 36–54)
LACTATE SERPL-SCNC: 1.3 MMOL/L (ref 0.5–2.2)
POC IONIZED CALCIUM: 1.04 MMOL/L (ref 1.06–1.42)
POC TCO2 (MEASURED): 32 MMOL/L (ref 23–29)
POTASSIUM BLD-SCNC: 3.3 MMOL/L (ref 3.5–5.1)
SAMPLE: ABNORMAL
SODIUM BLD-SCNC: 142 MMOL/L (ref 136–145)

## 2020-11-23 PROCEDURE — 25000003 PHARM REV CODE 250: Mod: HCNC | Performed by: PHYSICIAN ASSISTANT

## 2020-11-23 PROCEDURE — 99285 PR EMERGENCY DEPT VISIT,LEVEL V: ICD-10-PCS | Mod: HCNC,,, | Performed by: PHYSICIAN ASSISTANT

## 2020-11-23 PROCEDURE — 83605 ASSAY OF LACTIC ACID: CPT | Mod: HCNC

## 2020-11-23 PROCEDURE — 82550 ASSAY OF CK (CPK): CPT | Mod: HCNC

## 2020-11-23 PROCEDURE — 96374 THER/PROPH/DIAG INJ IV PUSH: CPT | Mod: HCNC

## 2020-11-23 PROCEDURE — 99285 EMERGENCY DEPT VISIT HI MDM: CPT | Mod: HCNC,,, | Performed by: PHYSICIAN ASSISTANT

## 2020-11-23 PROCEDURE — 63600175 PHARM REV CODE 636 W HCPCS: Mod: HCNC | Performed by: PHYSICIAN ASSISTANT

## 2020-11-23 PROCEDURE — 99285 EMERGENCY DEPT VISIT HI MDM: CPT | Mod: 25,HCNC

## 2020-11-23 PROCEDURE — 80047 BASIC METABLC PNL IONIZED CA: CPT | Mod: HCNC

## 2020-11-23 RX ORDER — CARVEDILOL 12.5 MG/1
25 TABLET ORAL
Status: COMPLETED | OUTPATIENT
Start: 2020-11-23 | End: 2020-11-23

## 2020-11-23 RX ORDER — ONDANSETRON 8 MG/1
8 TABLET, ORALLY DISINTEGRATING ORAL
Status: COMPLETED | OUTPATIENT
Start: 2020-11-23 | End: 2020-11-23

## 2020-11-23 RX ORDER — AMLODIPINE BESYLATE 5 MG/1
5 TABLET ORAL
Status: COMPLETED | OUTPATIENT
Start: 2020-11-23 | End: 2020-11-23

## 2020-11-23 RX ORDER — ACETAMINOPHEN 500 MG
1000 TABLET ORAL
Status: COMPLETED | OUTPATIENT
Start: 2020-11-23 | End: 2020-11-23

## 2020-11-23 RX ORDER — MORPHINE SULFATE 15 MG/1
15 TABLET ORAL
Status: COMPLETED | OUTPATIENT
Start: 2020-11-23 | End: 2020-11-23

## 2020-11-23 RX ORDER — LIDOCAINE 50 MG/G
2 PATCH TOPICAL
Status: DISCONTINUED | OUTPATIENT
Start: 2020-11-23 | End: 2020-11-23 | Stop reason: HOSPADM

## 2020-11-23 RX ORDER — DEXAMETHASONE SODIUM PHOSPHATE 4 MG/ML
8 INJECTION, SOLUTION INTRA-ARTICULAR; INTRALESIONAL; INTRAMUSCULAR; INTRAVENOUS; SOFT TISSUE
Status: COMPLETED | OUTPATIENT
Start: 2020-11-23 | End: 2020-11-23

## 2020-11-23 RX ADMIN — ONDANSETRON 8 MG: 8 TABLET, ORALLY DISINTEGRATING ORAL at 02:11

## 2020-11-23 RX ADMIN — CARVEDILOL 25 MG: 12.5 TABLET, FILM COATED ORAL at 12:11

## 2020-11-23 RX ADMIN — AMLODIPINE BESYLATE 5 MG: 5 TABLET ORAL at 12:11

## 2020-11-23 RX ADMIN — MORPHINE SULFATE 15 MG: 15 TABLET ORAL at 02:11

## 2020-11-23 RX ADMIN — DEXAMETHASONE SODIUM PHOSPHATE 8 MG: 4 INJECTION, SOLUTION INTRAMUSCULAR; INTRAVENOUS at 04:11

## 2020-11-23 RX ADMIN — ACETAMINOPHEN 1000 MG: 500 TABLET ORAL at 12:11

## 2020-11-23 RX ADMIN — LIDOCAINE 2 PATCH: 50 PATCH TOPICAL at 12:11

## 2020-11-23 NOTE — LETTER
November 27, 2020           Dear Ms. Hamilton,     Welcome to [facility] Diabetes Care Management Program!  My name is Baljeet Ruano and I will be your diabetes care manager.  As we discussed today, I work in partnership with your primary care provider to support and offer you tools and resources to help you achieve your health goals with confidence.     [facility] has worked with your [employer] Plan to develop a program based on recommendations from the American Diabetes Association and the [employer]s program called Together 2 Goal.     Some of the services we provide include:    Diabetes care tips and resources    Personal Diabetes Care Plan created with your primary care team    Nutrition counseling including meal planning and preparation tips    Personal goal setting and evaluation of goal progress    Access to diabetes support groups    Referral for diabetes retinal eye exam    Referral to a Diabetes Specialty Clinic (if needed)    24/7 assistance from [facility]      All services provided by this program are coordinated and communicated to your primary care team.     You will be receiving more information about these services in your [facility] account, by phone or through the mail. You may also visit our website [email or web address].     If you do not wish to participate or receive information about these services, please contact our office at [phone] or contact me directly through the patient portal or directly at [phone].      I look forward to our partnership!  We will work as a team to build or enhance your skills for managing your diabetes.       Sincerely,        Baljeet Ruano RN  Diabetes Care Management Team

## 2020-11-23 NOTE — PROVIDER PROGRESS NOTES - EMERGENCY DEPT.
Encounter Date: 11/23/2020    ED Physician Progress Notes        Physician Note:   ED Physician Hand-off Note:    ED Course: I assumed care of patient from off-going ED physician team. Briefly, Patient is a 80-year-old female that presents the ED with left lower leg pain mainly in her left knee.     Her workup thus far included negative x-rays for acute fractures or dislocations.  Remote left TKA.  Ultrasound of veins does not show DVT.    At the time of signout plan was pending arterial US.    Medications given in the ED:    Medications  lidocaine 5 % patch 2 patch (2 patches Transdermal Patch Applied 11/23/20 1234)  amLODIPine tablet 5 mg (5 mg Oral Given 11/23/20 1235)  carvediloL tablet 25 mg (25 mg Oral Given 11/23/20 1235)  acetaminophen tablet 1,000 mg (1,000 mg Oral Given 11/23/20 1235)  morphine tablet 15 mg (15 mg Oral Given 11/23/20 1432)  ondansetron disintegrating tablet 8 mg (8 mg Oral Given 11/23/20 1432)  dexamethasone injection 8 mg (8 mg Intravenous Given 11/23/20 1604)    Disposition:  Arterial ultrasound shows PAD in her RIGHT lower extremity.  Patient has been experiencing pain in her left lower extremity.  I discussed etiology.  Continue home medication.  Ambulatory referral placed to vascular surgery.  Likely patient's LEFT lower extremity pain is secondary to radiculopathy versus arthritis in the left knee.  I discussed etiology.  She is to continue acetaminophen for pain relief.  We discussed use with over-the-counter lidocaine patches.  Rest.  Ice or heat.  Follow up closely with providers if symptoms do not improve.  All questions answered.      Patient comfortable with plan and stable for discharge      Impression: L leg pain, L knee pain, PAD of CHRISTEL Aguiar PA-C  Emergent Department  [facility]  Spectralink #[id number] or #[id number]

## 2020-11-23 NOTE — PROGRESS NOTES
11/23/2020-1st Attempt to complete initial assessment for Outpatient Care Management. Left message requesting a return call. Will mail letter requesting a return call.  Will attempt to contact at a later date.   Baljeet Ruano RN  Outpatient Care Management

## 2020-11-23 NOTE — ED PROVIDER NOTES
"Encounter Date: 11/23/2020       History     Chief Complaint   Patient presents with    Knee Pain     Pt c/o left knee pain.  States "I have a knot on my knee"   Denies injury.       80-year-old female with multiple comorbidities including CAD, IDDM2, CHF, hypertension, peripheral neuropathy, arthritis presents for atraumatic left knee and leg pain for 3 days.  Her pain started on Friday but she can not identify a specific inciting factor.  She reports both an aching in her thigh, knee and shin as well as sharp, stabbing intermittent pain radiating from her left lower back down her posterior leg.  She took some Tylenol at home which did improve her pain slightly.  Pain is worse with movement.  She reports associated leg swelling.  She denies color change, numbness/tingling/weakness, fevers/chills chest pain or shortness of breath.  No history of DVT/PE, no recent surgeries but she was seen in pain management clinic for epidural steroid injection in early October.        Review of patient's allergies indicates:   Allergen Reactions    Crestor [rosuvastatin]      Cramping in legs    Ezetimibe Diarrhea     Other reaction(s): abdominal pain, Diarrhea      Hydrocodone Itching    Lisinopril      Other reaction(s): cough      Sulfa (sulfonamide antibiotics) Itching and Rash           Sulfamethoxazole     Sulfamethoxazole-trimethoprim     Valsartan Swelling     Past Medical History:   Diagnosis Date    Abnormality of lung 11/08/2011    Stable bandlike opacities at the lung bases, most likely representing      Anxiety     Arthritis     CAD (coronary artery disease)     Calculus of ureter 2/22/2011    CHF (congestive heart failure)     Chronic back pain 7/29/2012    Colon polyp 9/2010; 11/2010    Colon polyps 7/29/2012    Coronary artery disease involving native coronary artery of native heart with angina pectoris     Depression     Diabetes mellitus     Diabetes mellitus type II     Diverticulitis " large intestine 7/27/2015    Diverticulitis of large intestine without perforation or abscess without bleeding     Diverticulosis 09/25/2010; 11/02/2010; 11/08/2011; 7/29/2012    Duodenal disorder 08/25/2011    Duodenal erosion noted on EGD.    Duodenal ulcer, unspecified as acute or chronic, without hemorrhage, perforation, or obstruction 8/24/2011    E. coli sepsis 12/2010    Due to left ureteral stone with left nephrostomy tube - hospitalized in [place]    Esophageal dysmotility 01/24/2012    Noted on upper GI-barium swallow.    Facial weakness 1969    Left facial weakness s/p left mastoidectomy in ~ 1969.    Fatty liver 11/08/2011    Reported on CT-abdomen and in 06/2012 Gastro clinic visit note.    Gastric polyp 09/29/2010    GERD (gastroesophageal reflux disease) 7/29/2012    Hepatomegaly 11/08/2011    Reported on CT-abdomen    Herpes zoster with other nervous system complications(053.19) 2/28/2011    Hiatal hernia 06/26/2006; 09/29/2010; 08/25/2011    Noted on barium swallow 2006; noted on  EGD 2011.   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    Trouble in sleeping     Type II or unspecified type diabetes mellitus with neurological manifestations, not stated as uncontrolled(250.60)     Type  II or unspecified type diabetes mellitus with peripheral circulatory disorders, not stated as uncontrolled(250.70)     Type II or unspecified type diabetes mellitus with renal manifestations, not stated as uncontrolled(250.40)      Past Surgical History:   Procedure Laterality Date    BELPHAROPTOSIS REPAIR      s/p LAMBERTO levator repair - Dr. Dejesus    CARDIAC CATHETERIZATION      CARPAL TUNNEL RELEASE  3/13/2012    CATARACT EXTRACTION W/  INTRAOCULAR LENS IMPLANT Right 10/31/2018        CATARACT EXTRACTION W/  INTRAOCULAR LENS IMPLANT Left 03/22/2018        CHOLECYSTECTOMY      COLONOSCOPY N/A 11/16/2016    Procedure: COLONOSCOPY;  Surgeon: Chris Storey MD;  Location: [facility] ENDO (4TH FLR);  Service: Endoscopy;  Laterality: N/A;    COLONOSCOPY N/A 6/14/2017    Procedure: COLONOSCOPY;  Surgeon: Chris Storey MD;  Location: [facility] ENDO (4TH FLR);  Service: Endoscopy;  Laterality: N/A;  colonoscopy in 3 months with better bowel prep. - Split PEG prep ordered    EXTRACORPOREAL SHOCK WAVE LITHOTRIPSY      INJECTION OF ANESTHETIC AGENT AROUND NERVE Bilateral 6/24/2020    Procedure: BLOCK, NERVE, C4-C5-C6 MEDIAL BRANCH ok per [place] to add on;  Surgeon: Oscar Ravi MD;  Location: [facility] PAIN MGT;  Service: Pain Management;  Laterality: Bilateral;    INTRAOCULAR PROSTHESES INSERTION Right 10/31/2018    Procedure: INSERTION-INTRAOCULAR LENS (IOL);  Surgeon: Keysha Valle MD;  Location: [facility] OR;  Service: Ophthalmology;  Laterality: Right;    JOINT REPLACEMENT      mastoid tumor removal  1969    Left mastoidectomy with residual left facial weakness.    NEPHROSTOMY      OOPHORECTOMY      PHACOEMULSIFICATION OF CATARACT Right 10/31/2018    Procedure: PHACOEMULSIFICATION-ASPIRATION-CATARACT;  Surgeon: Keysha Valle MD;  Location: [facility] OR;  Service: Ophthalmology;  Laterality: Right;    TOTAL ABDOMINAL HYSTERECTOMY  1969    TAHBSO    TOTAL KNEE ARTHROPLASTY  9/13/2012    Left     TRANSFORAMINAL EPIDURAL INJECTION OF STEROID Bilateral 9/17/2019    Procedure: INJECTION, STEROID, EPIDURAL, TRANSFORAMINAL APPROACH;  Surgeon: Oscar Ravi MD;  Location: [facility] PAIN MGT;  Service: Pain Management;  Laterality: Bilateral;  B/L TFESI L4/L5    TRANSFORAMINAL EPIDURAL INJECTION OF STEROID Bilateral 10/2/2020    Procedure: INJECTION, STEROID, EPIDURAL, TRANSFORAMINAL APPROACH;  Surgeon: Oscar Ravi MD;  Location: [facility] PAIN MGT;  Service: Pain Management;  Laterality: Bilateral;  B/L TFESI L4/5     Family History   Problem Relation Age of Onset    Sleep apnea Sister     Diabetes Sister     Cancer Mother         brain tumor    Hypertension Mother     Heart disease Father     Heart attack Father     Dementia Brother     Lupus Brother     Frontotemporal dementia Brother     No Known Problems Daughter     No Known Problems Son     Diabetes Sister     Lupus Sister     Breast cancer Sister     Diabetes Sister     Cancer Sister         breast cancer    Heart attack Sister     Diabetes Sister     Liver cancer Brother     Drug abuse Brother     Alcohol abuse Brother     Cirrhosis Brother     Drug abuse Brother     No Known Problems Daughter     No Known Problems Son     No Known Problems Son     No Known Problems Maternal Grandmother     No Known Problems Maternal Grandfather     No Known Problems Paternal Grandmother     No Known Problems Paternal Grandfather     Diabetes Brother     Dementia Brother     Diabetes Other     Ovarian cancer Other         Niece     Breast cancer Maternal Aunt     No Known Problems Maternal Uncle     No Known Problems Paternal Aunt     No Known Problems Paternal Uncle     Glaucoma Neg Hx     Blindness Neg Hx     Celiac disease Neg Hx     Colon cancer Neg Hx     Colon polyps Neg Hx     Esophageal cancer Neg Hx     Inflammatory bowel disease Neg Hx     Liver disease Neg Hx     Rectal cancer Neg Hx     Stomach cancer Neg Hx      Ulcerative colitis Neg Hx     Melanoma Neg Hx     Multiple sclerosis Neg Hx     Psoriasis Neg Hx     Amblyopia Neg Hx     Cataracts Neg Hx     Macular degeneration Neg Hx     Retinal detachment Neg Hx     Strabismus Neg Hx     Stroke Neg Hx     Thyroid disease Neg Hx      Social History     Tobacco Use    Smoking status: Former Smoker     Packs/day: 1.00     Years: 15.00     Pack years: 15.00     Types: Cigarettes     Quit date: 2000     Years since quittin.3    Smokeless tobacco: Never Used   Substance Use Topics    Alcohol use: No    Drug use: No     Review of Systems   Constitutional: Negative for fever.   HENT: Negative for sore throat.    Respiratory: Negative for shortness of breath.    Cardiovascular: Positive for leg swelling. Negative for chest pain and palpitations.   Gastrointestinal: Negative for nausea.   Genitourinary: Negative for dysuria.   Musculoskeletal: Positive for back pain and joint swelling.   Skin: Negative for rash.   Neurological: Negative for weakness and numbness.   Hematological: Does not bruise/bleed easily.       Physical Exam     Initial Vitals [20 1135]   BP Pulse Resp Temp SpO2   (!) 185/136 86 20 98.5 °F (36.9 °C) 97 %      MAP       --         Physical Exam    Nursing note and vitals reviewed.  Constitutional: She appears well-developed and well-nourished.   HENT:   Head: Normocephalic and atraumatic.   Eyes: EOM are normal. Pupils are equal, round, and reactive to light.   Neck: Normal range of motion. Neck supple.   Cardiovascular: Normal rate, regular rhythm, normal heart sounds and intact distal pulses. Exam reveals no gallop and no friction rub.    No murmur heard.  No lower extremity erythema or edema but there is significant calf and thigh tenderness   Pulmonary/Chest: Breath sounds normal. No respiratory distress. She has no wheezes. She has no rhonchi. She has no rales. She exhibits no tenderness.   Musculoskeletal: Normal range of motion.       Right knee: Normal.      Left knee: She exhibits bony tenderness. She exhibits normal range of motion and no effusion. Tenderness found. Lateral joint line tenderness noted.        Right ankle: Normal.        Left ankle: Normal.      Right upper leg: Normal.      Left upper leg: She exhibits tenderness. She exhibits no bony tenderness, no swelling, no edema, no deformity and no laceration.      Right lower leg: Normal.      Left lower leg: She exhibits tenderness. She exhibits no bony tenderness, no swelling, no deformity and no laceration. No edema.   Neurological: She is alert and oriented to person, place, and time. She has normal strength. No sensory deficit.   Skin: Skin is warm and dry.   Psychiatric: She has a normal mood and affect.         ED Course   Procedures  Labs Reviewed   ISTAT PROCEDURE - Abnormal; Notable for the following components:       Result Value    POC Glucose 138 (*)     POC Potassium 3.3 (*)     POC TCO2 (MEASURED) 32 (*)     POC Ionized Calcium 1.04 (*)     All other components within normal limits   LACTIC ACID, PLASMA   CK   ISTAT CHEM8          Imaging Results          US Lower Extremity Arteries Left (In process)                US Ankle/Brach Indices W/O Stress 1-2 Levels (In process)                X-Ray Knee 3 View Left (Final result)  Result time 11/23/20 16:04:45    Final result by Timbo Baumann MD (11/23/20 16:04:45)                 Impression:      Remote left TKA without evidence of acute malalignment, loosening or displaced fracture-dislocation.      Electronically signed by: Timbo Baumann MD  Date:    11/23/2020  Time:    16:04             Narrative:    EXAMINATION:  XR KNEE 3 VIEW LEFT    CLINICAL HISTORY:  Pain in unspecified knee    TECHNIQUE:  AP, lateral, and Merchant views of the left knee were performed.    COMPARISON:  Bilateral knee series 01/16/2019    FINDINGS:  Generalized osteopenia.  Left TKA appears stable without evidence of acute malalignment or  loosening.  No new displaced fracture, dislocation or destructive osseous process.  No large suprapatellar joint effusion.  Scattered atherosclerotic vascular calcifications noted.  No subcutaneous emphysema or radiodense retained foreign body.                               US Lower Extremity Veins Left (Final result)  Result time 11/23/20 14:14:59    Final result by Darwin Roberts Jr., MD (11/23/20 14:14:59)                 Impression:      No evidence of deep venous thrombosis in the left lower extremity.    Incidentally noted calcific atherosclerosis involving the left popliteal artery in this patient with left lower extremity pain.  Further evaluation may be performed with lower extremity arterial ultrasound or CTA runoff, as clinically indicated.    Electronically signed by resident: Sharif Garcia  Date:    11/23/2020  Time:    13:31    Electronically signed by: Darwin Douglass Jr  Date:    11/23/2020  Time:    14:14             Narrative:    EXAMINATION:  US LOWER EXTREMITY VEINS LEFT    CLINICAL HISTORY:  Pain in leg, unspecified    TECHNIQUE:  Duplex and color flow Doppler evaluation and graded compression of the left lower extremity veins was performed.    COMPARISON:  Ultrasound lower extremity veins 04/03/2018.    FINDINGS:  Left thigh veins: The common femoral, femoral, popliteal, upper greater saphenous, and deep femoral veins are patent and free of thrombus. The veins are normally compressible and have normal phasic flow and augmentation response.    Left calf veins: The visualized calf veins are patent.    Contralateral CFV: The contralateral (right) common femoral vein is patent and free of thrombus.    Miscellaneous: Incidentally noted atherosclerosis involving the left popliteal artery.                                 Medical Decision Making:   History:   Old Medical Records: I decided to obtain old medical records.  Old Records Summarized: records from clinic visits.       <> Summary of  Records: Patient seen in primary care clinic 11/13/2020 for routine follow-up.  She is followed by pain management for chronic low back pain and receives epidural steroid injections, most recently in early October  Initial Assessment:   80-year-old female presenting for atraumatic pain in her left leg.  She is hypertensive at 185/136 with otherwise normal vitals.  She did not yet take her blood pressure medication.  She has some thigh and leg tenderness on exam.  Differential Diagnosis:   Arthritis  DVT  Muscle strain  Sciatica  Independently Interpreted Test(s):   I have ordered and independently interpreted X-rays - see summary below.       <> Summary of X-Ray Reading(s): No acute fracture  Clinical Tests:   Lab Tests: Ordered and Reviewed  Radiological Study: Ordered and Reviewed  ED Management:  Will give Tylenol, place lidocaine patches, do x-ray, DVT study and reassess.    Patient still reporting severe pain after medications.  Will give MSIR.  The x-ray without any fracture or malalignment of prosthetic.  Ultrasound shows no evidence of DVT but incidental finding of atherosclerosis of popliteal artery.  Patient does report that her foot always feels cold, pulses present but decreased therefore will do arterial ultrasound with ABIs.  Dispo pending results of imaging.  Discussed this patient with my supervising physician and I am signing out to Andreina Aguiar PA-C.    5:13 PM  Cassidy Ruiz PA-C                Attending Attestation:     Physician Attestation Statement for NP/PA:   I discussed this assessment and plan of this patient with the NP/PA, but I did not personally examine the patient. The face to face encounter was performed by the NP/PA.                  ED Course as of Nov 23 1713   Mon Nov 23, 2020   1647 Lactate, Tobi: 1.3 [CC]      ED Course User Index  [CC] Cassidy Ruiz PA-C            Clinical Impression:       ICD-10-CM ICD-9-CM   1. Knee pain  M25.569 719.46   2. Leg pain  M79.606  729.5   3. Leg pain  M79.606 729.5                                               Cassidy Ruiz PA-C  11/23/20 1711

## 2020-11-23 NOTE — LETTER
November 23, 2020    Henrietta GORMAN [place]  [address] [facility] [address]             [facility] [address] [place] [address] Dear Ms. Villasenor    My name is Baljeet and I work with [facility] Outpatient Case Management Department. We received a referral to call you to discuss your medical history.These services are free of charge and are offered to [facility] patients who have recently been discharged from any of our facilities or who have complex medical conditions that may require the skill of a nurse to assist with management.             I attempted to reach you by telephone, but I was unsuccessful. Please call our department so that we can go over some questions with you regarding your health.    The Outpatient Case Management Department can be reached at [phone] from 8:00AM to 4:30 PM on Monday thru Friday. [facility] also has a program where a nurse is available 24/7 to answer questions or provide medical advice, their number is [phone].    Thank You,      Baljeet Ruano, RN   Outpatient Care Management  [phone]

## 2020-11-23 NOTE — FIRST PROVIDER EVALUATION
" Emergency Department TeleTriage Encounter Note      CHIEF COMPLAINT    Chief Complaint   Patient presents with    Knee Pain     Pt c/o left knee pain.  States "I have a knot on my knee"   Denies injury.         VITAL SIGNS   Initial Vitals [11/23/20 1135]   BP Pulse Resp Temp SpO2   (!) 185/136 86 20 98.5 °F (36.9 °C) 97 %      MAP       --            ALLERGIES    Review of patient's allergies indicates:   Allergen Reactions    Crestor [rosuvastatin]      Cramping in legs    Ezetimibe Diarrhea     Other reaction(s): abdominal pain, Diarrhea      Hydrocodone Itching    Lisinopril      Other reaction(s): cough      Sulfa (sulfonamide antibiotics) Itching and Rash           Sulfamethoxazole     Sulfamethoxazole-trimethoprim     Valsartan Swelling       PROVIDER TRIAGE NOTE   Patient presents to the ER with complaint of right knee pain x 4 days.  She reports a knot in the knee area.  Will order xray pending ED provider evaluation.  She took tylenol this morning.  Will order xray pending ED provider evaluation, unable to assess full leg on limited teletriage exam.      Initial orders will be placed and care will be transferred to an alternate provider when patient is roomed for a full evaluation. Any additional orders and the final disposition will be determined by that provider.         ORDERS  Labs Reviewed - No data to display    ED Orders (720h ago, onward)    None            Virtual Visit Note: The provider triage portion of this emergency department evaluation and documentation was performed via [employer], a HIPAA-compliant telemedicine application, in concert with a tele-presenter in the room. A face to face patient evaluation with one of my colleagues will occur once the patient is placed in an emergency department room.      DISCLAIMER: This note was prepared with [employer] voice recognition transcription software. Garbled syntax, mangled pronouns, and other bizarre constructions may be attributed to " that software system.

## 2020-11-23 NOTE — ED NOTES
Patient identifiers verified and correct for  Ms Villasenor  C/C: Left knee pain SEE NN  APPEARANCE: awake and alert in NAD.  SKIN: warm, dry and intact. No breakdown or bruising. ABle to see veins in LLE outer leg, pain  From left outer thigh to knee  MUSCULOSKELETAL: Patient moving all extremities spontaneously, no obvious swelling or deformities noted. Ambulates  Uses cane to ambulate  RESPIRATORY: Denies shortness of breath.Respirations unlabored. Denies fevers  CARDIAC: Denies CP, 2+ distal pulses; no peripheral edema  ABDOMEN: S/ND/NT, Denies nausea  : voids spontaneously, denies difficulty  Neurologic: AAO x 4; follows commands equal strength in all extremities; denies numbness/tingling. Denies dizziness Positive gen weakness

## 2020-11-24 NOTE — DISCHARGE INSTRUCTIONS
Continue your high blood pressure medication.  Your x-rays do not show any fractures or dislocation. You Ultrasound does not show any blood clots. You have peripheral arterial disease of the RIGHT leg.  Continue acetaminophen/Tylenol 650 mg every 6 hr for pain relief.  Apply heat to the area of pain for 10 min every 4 hr.  Consider using over-the-counter lidocaine patches every 12 hr or as recommended.  Follow up with your primary care physician.  Return to the ER for new or worsening symptoms.  Future Appointments   Date Time Provider Department Center   11/30/2020 11:00 AM Clover Kirby NP [facility]   1/26/2021  9:00 AM Niya Flores NP [facility]   2/10/2021 11:30 AM Jeannine Arriaga MD [facility] E[facility]ER [facility] PCW   Our goal in the emergency department is to always give you outstanding care and exceptional service. You may receive a survey by mail or e-mail in the next week regarding your experience in our ED. We would greatly appreciate your completing and returning the survey. Your feedback provides us with a way to recognize our staff who give very good care and it helps us learn how to improve when your experience was below our aspiration of excellence.

## 2020-11-27 NOTE — PROGRESS NOTES
Outpatient Care Management  Initial Patient Assessment    Patient: Henrietta Villasenor  MRN: [id number]  Date of Service: 11/23/2020  Completed by: Baljeet Ruano RN  Referral Date: 11/13/2020  Program: Disease Management (Diabetes & COPD)    Reason for Visit   Patient presents with    OPCM RN First Assessment Attempt     11/23/2020    OPCM Enrollment Call     11/27/2020    Initial Assessment     11/27/2020    Plan Of Care     11/27/2020    OPCM Welcome Letter     11/27/2020       Brief Summary: Contacted patient to complete initial assessment for OPCM. During call, permission received from patient to speak with her niece, Kacie Mast, about patient's care.  No Advanced care Planning on file, patient declined resource info for ACP at this time. Referral received from patient's PCP, Dr. Arriaga to assist with diabetes management. Patient is a 81yo female with a history of uncontrolled Type 2 DM, with her most recent HgbA1C at 9.3 on 10/22.  Patient lives alone in her home, reported that her grandson will come stay with her occasionally. Reported that she is mostly independent with ADLs and IADLs. Patient stated that she does forget to take her medications several times during the week, currently does not use a pillbox, declined need for use at this time. Med rec deferred during call, will complete with next encounter. Denies any difficulty affording her medications. Patient stated that she checks her BG 2x daily, with her most recent BG reading at 152. Reviewed the importance of keeping log to assist with BG monitoring as well as management of diabetes. Verbalized understanding. PHQ2 performed and score of 2 obtained. Reported that she does use a cane to assist with ambulation but would like to see if she qualifies for a rollator, will send PCP message with patient request. Reported that she did have 1 fall in the past year, denies any injury. Patient does not drive and relies on [employer] for transportation. Patient also  with a hx of CHF, may benefit from additional education on management of CHF. Patient in agreement with OPCM enrollment to assist with disease management. Will mail educational materials to assist with increasing knowledge for diabetes and CHF management. Discussed with patient to have a personal goal of lowering A1c to < 7.0% in order to prevent long term complications from happening. Discussed with patient s/s and treatment of hypoglycemia. Encouraged patient to call this RN if having any questions/concerns. Discussed completing F/U call with patient, who is in agreement with call in 1 week.        Next Steps:   Confirm receipt of resource materials  Complete Med Rec  Follow up on message to PCP ( requesting rollator)  Review BG readings  Continue education on Diabetes and CHF    Assessment Documentation     OPCM Initial Assessment    Involvement of Care  Do I have permission to speak with other family members about your care?: Yes  Assessment completed by: Patient  Identified Areas of Need  Advanced Care Planning: No  Housing: no  Medication Adherence: Yes  *Active medication list was reviewed and reconciled with patient and/or caregiver:   Nutrition: yes  Lab Adherence: no  Depression: No (Comment: PHQ2= 2)  Cognitive/Behavioral Health: no  Communication: no  Health Literacy: no  Fall risk?: No  Equipment/Supplies/Services: no          Problem List and History     Problems Addressed This Visit    Heart Failure: Identified as current problem  Hypertension: Not identified by patient as current problem  Diabetes: Identified as current problem  Anemia: Not identified by patient as current problem  Chronic Kidney Disease: Not identified by patient as current problem  Hyperlipidemia: Not identified by patient as current problem  Heart Disease: Not identified by patient as current problem         Reviewed medical and social history with patient and/or caregiver. A complex care plan was discussed and completed today,  with input from patient and/or caregiver.    Patient Instructions     Instructions were provided via the [email or web address] patient resources and are available for the patient to view on the patient portal, if active.    Follow up in about 11 days (around 12/4/2020) for RN Follow UP.    Todays OPCM Self-Management Care Plan was developed with the patients/caregivers input and was based on identified barriers from todays assessment.  Goals were written today with the patient/caregiver and the patient has agreed to work towards these goals to improve his/her overall well-being. Patient verbalized understanding of the care plan, goals, and all of today's instructions. Encouraged patient/caregiver to communicate with his/her physician and health care team about health conditions and the treatment plan.  Provided my contact information today and encouraged patient/caregiver to call me with any questions as needed.

## 2020-11-30 NOTE — PATIENT INSTRUCTIONS
Left- or Right- Side Congestive Heart Failure (CHF)    The heart is a large muscle. It is a pump that circulates blood throughout the body. Blood carries oxygen to all of the organs, including the brain, muscles, and skin. After your body takes the oxygen out of the blood, the blood returns to the heart. The right side of the heart collects the blood from the body and pumps it to the lungs. In the lungs, it gets fresh oxygen and gives up carbon dioxide. The oxygen-rich blood from the lungs then returns to the left side of the heart, where it is pumped back out to the rest of your body, starting the process all over.  Congestive heart failure (CHF) occurs when the heart muscle is weakened. This affects the pumping action of the heart. Heart failure can affect the right side of the heart or the left side. But heart failure may affect not only the right side of the heart or only the left side. Although it may have started on one side, it can and often eventually does affect both sides.  Right-side heart failure  When the right side of the heart is weakened, it cant handle the blood it is getting from the rest of the body. This blood returns to the heart through veins. When too much pressure builds up in the veins, fluid leaks out into the tissues. Gravity then causes that fluid to move to those parts of the body that are the lowest. So one of the first symptoms of right-side CHF can include swelling in the feet and ankles. If the condition gets worse, the swelling can even go up past the knees. Sometimes it gets so severe, the liver can get congested as well.  Left-side heart failure  When the left side of the heart is weakened, it cant handle the blood it gets from the lungs. Pressure then builds up in the veins of the lungs, causing fluid to leak into the lung tissues. This may cause CHF and pulmonary edema. This causes you to feel short of breath, weak, or dizzy. These symptoms are often worse with exertion,  such as when climbing stairs or walking up hills. Lying with your head flat is uncomfortable and can make your breathing worse. This may make sleeping difficult. You may need to use extra pillows to elevate your upper body to sleep well. The same is true when just resting during the daytime.  There are many causes of heart failure including:  · Coronary artery disease  · Past heart attack (also known as acute myocardial infarction, or AMI)  · High blood pressure  · Damaged heart valve  · Diabetes  · Obesity  · Cigarette smoking  · Alcohol abuse  Heart failure is a chronic condition. There is no cure. The purpose of medical treatment is to improve the pumping action of the heart. The main way to do this is to remove excess water from the body. A number of medicines can help reach this goal, improve symptoms, and prevent the heart from becoming weaker. Sometimes, heart failure can become so severe that a device is placed in the heart to help with pumping. Another major goal is to better treat the causes of heart failure, such as diabetes and high blood pressure, by making changes in your lifestyle and maximizing medical control when needed.  Home care  Follow these guidelines when caring for yourself at home:  · Check your weight every day. This is very important because a sudden increase in weight gain could mean worsening heart failure. Keep these things in mind:  ¨ Use the same scale every day.  ¨ Weigh yourself at the same time every day.  ¨ Make sure the scale is on a hard floor surface, not on a rug or carpet.  ¨ Keep a record of your weight every day so your healthcare provider can see it. If you are not given a log sheet for this, keep a separate journal for this purpose.   · Cut back on the amount of salt (sodium) you eat. Follow your healthcare provider's recommendation on how much salt or sodium you should have each day.  ¨ Avoid high-salt foods. These include olives, pickles, smoked meats, salted potato  chips, and most prepared foods.  ¨ Don't add salt to your food at the table. Use only small amounts of salt when cooking.  ¨ Read the labels carefully on food packages to learn how much salt or sodium is in each serving in the package. Remember, a can or package of food may contain more than 1 serving. So if you eat all the food in the package, you may be getting more salt than you think.  · Follow your healthcare provider's recommendations about how much fluid you should have. Be aware that some foods, such as soup, pudding, and juicy fruits like oranges or melons, contain liquid. You'll need to count the liquid in those foods as part of your daily fluid intake. Your provider can help you with this.  · Stop smoking.  · Cut back on how much alcohol you drink.  · Lose weight if you are overweight. The excess weight adds a lot of stress on the workload of the heart.  · Stay active. Talk with your provider about an exercise program that is safe for your heart.  · Keep your feet elevated to reduce swelling. Ask your provider about support hose as a preventive treatment for daytime leg swelling.  Besides taking your medicine as instructed, an important part of treatment is lifestyle changes. These include diet, physical activity, stopping smoking, and weight control.  Improve your diet by including more fresh foods, cutting back on how much sugar and saturated fat you eat, and eating fewer processed foods and less salt.  Follow-up care  Follow up with your healthcare provider, or as advised.  Make sure to keep any appointments that were made for you. These can help better control your congestive heart failure. You will need to follow up with your provider on a routine basis to make sure your heart failure is well managed.  If an X-ray, ECG, or other tests were done, you will be told of any new findings that may affect your care.  Call 911  Call 911 if you:  · Become severely short of breath  · Feel lightheaded, or feel  like you might pass out or faint  · Have chest pain or discomfort that is different than usual, the medicines your doctor told you to use for this don't help, or the pain lasts longer than 10 to 15 minutes  · You suddenly develop a rapid heart rate  When to seek medical advice  The following may be signs that your heart failure is getting worse. Call your healthcare provider right away if any of these happen:  · Sudden weight gain. This means 3 or more pounds in one day, or 5 or more pounds in 1 week.  · Trouble breathing not related to being active  · New or increased swelling of your legs or ankles  · Swelling or pain in your abdomen  · Breathing trouble at night. This means waking up short of breath or needing more pillows to breathe.  · Frequent coughing that doesnt go away  · Feeling much more tired than usual  Date Last Reviewed: 1/4/2016  © 4816-6209 [employer]. [address] [place] [address]. All rights reserved. This information is not intended as a substitute for professional medical care. Always follow your healthcare professional's instructions.        Heart Failure: Making Changes to Your Diet  You have a condition called heart failure. When you have heart failure, excess fluid is more likely to build up in your body because your heart isn't working well. This makes the heart work harder to pump blood. Fluid buildup causes symptoms such as shortness of breath and swelling (edema). This is often referred to as congestive heart failure or CHF. Controlling the amount of salt (sodium) you eat may help stop fluid from building up. Your doctor may also tell you to reduce the amount of fluid you drink.  Reading food labels    Your healthcare provider will tell you how much sodium you can eat each day. Read food labels to keep track. Keep in mind that certain foods are high in salt. These include canned, frozen, and processed foods. Check the amount of sodium in each serving. Watch  out for high-sodium ingredients. These include MSG (monosodium glutamate), baking soda, and sodium phosphate.   Eating less salt  Give yourself time to get used to eating less salt. It may take a little while. Here are some tips to help:  · Take the saltshaker off the table. Replace it with salt-free herb mixes and spices.  · Eat fresh or plain frozen vegetables. These have much less salt than canned vegetables.  · Choose low-sodium snacks like sodium-free pretzels, crackers, or air-popped popcorn.  · Dont add salt to your food when youre cooking. Instead, season your foods with pepper, lemon, garlic, or onion.  · When you eat out, ask that your food be cooked without added salt.  · Avoid eating fried foods as these often have a great deal of salt.  If youre told to limit fluids  You may need to limit how much fluid you have to help prevent swelling. This includes anything that is liquid at room temperature, such as ice cream and soup. If your doctor tells you to limit fluid, try these tips:  · Measure drinks in a measuring cup before you drink them. This will help you meet daily goals.  · Chill drinks to make them more refreshing.  · Suck on frozen lemon wedges to quench thirst.  · Only drink when youre thirsty.  · Chew sugarless gum or suck on hard candy to keep your mouth moist.  · Weigh yourself daily to know if your body's fluid content is rising.  My sodium goal  Your healthcare provider may give you a sodium goal to meet each day. This includes sodium found in food as well as salt that you add. My goal is to eat no more than ___________ mg of sodium per day.     When to call your doctor  Call your doctor right away if you have any symptoms of worsening heart failure. These can include:  · Sudden weight gain  · Increased swelling of your legs or ankles  · Trouble breathing when youre resting or at night  · Increase in the number of pillows you have to sleep on  · Chest pain, pressure, discomfort, or pain  in the jaw, neck, or back   Date Last Reviewed: 3/21/2016  © 1780-1494 [employer]. [address] [place] [address], [place] [address]. All rights reserved. This information is not intended as a substitute for professional medical care. Always follow your healthcare professional's instructions.        Heart Failure: Warning Signs of a Flare-Up  You have a condition called heart failure. Once you have heart failure, flare-ups can happen. Below are signs that can mean your heart failure is getting worse. If you notice any of these warning signs, call your healthcare provider.  Swelling    · Your feet, ankles, or lower legs get puffier.  · You notice skin changes on your lower legs.  · Your shoes feel too tight.  · Your clothes are tighter in the waist.  · You have trouble getting rings on or off your fingers.  Shortness of breath  · You have to breathe harder even when youre doing your normal activities or when youre resting.  · You are short of breath walking up stairs or even short distances.  · You wake up at night short of breath or coughing.  · You need to use more pillows or sit up to sleep.  · You wake up tired or restless.  Other warning signs  · You feel weaker, dizzy, or more tired.  · You have chest pain or changes in your heartbeat.  · You have a cough that wont go away.  · You cant remember things or dont feel like eating.  Tracking your weight  Gaining weight is often the first warning sign that heart failure is getting worse. Gaining even a few pounds can be a sign that your body is retaining excess water and salt. Weighing yourself each day in the morning after you urinate and before you eat, is the best way to know if you're retaining water. Get a scale that is easy to read and make sure you wear the same clothes and use the same scale every time you weigh. Your healthcare provider will show you how to track your weight. Call your doctor if you gain more than 2 pounds in 1 day, 5 pounds in 1  week, or whatever weight gain you were told to report by your doctor. This is often a sign of worsening heart failure and needs to be evaluated and treated before it compromises your breathing. Your doctor will tell you what to do next.    Date Last Reviewed: 3/15/2016  © 5107-0888 [employer]. [address] [place] [address], [place] [address]. All rights reserved. This information is not intended as a substitute for professional medical care. Always follow your healthcare professional's instructions.        Heart Failure: Tracking Your Weight  You have a condition called heart failure. When you have heart failure, a sudden weight gain or a steady rise in weight is a warning sign that your body is retaining too much water and salt. This could mean your heart failure is getting worse. If left untreated, it can cause problems for your lungs and result in shortness of breath. Weighing yourself each day is the best way to know if youre retaining water. If your weight goes up quickly, call your doctor. You will be given instructions on how to get rid of the excess water. You will likely need medicines and to avoid salt. This will help your heart work better.  Call your doctor if you gain more than 2 pounds in 1 day, more than 5 pounds in 1 week, or whatever weight gain you were told to report by your doctor. This is often a sign of worsening heart failure and needs to be evaluated and treated. Your doctor will tell you what to do next.   Tips for weighing yourself    · Weigh yourself at the same time each morning, wearing the same clothes. Weigh yourself after urinating and before eating.  · Use the same scale each day. Make sure the numbers are easy to read. Put the scale on a flat, hard surface -- not on a rug or carpet.  · Do not stop weighing yourself. If you forget one day, weigh again the next morning.  How to use your weight chart  · Keep your weight chart near the scale. Write your weight on the chart as  soon as you get off the scale.  · Fill in the month and the start date on the chart. Then write down your weight each day. Your chart will look like this:    · If you miss a day, leave the space blank. Weigh yourself the next day and write your weight in the next space.  · Take your weight chart with you when you go to see your doctor.  Date Last Reviewed: 3/20/2016  © 3212-6324 [employer]. [address] [place] [address], [place] [address]. All rights reserved. This information is not intended as a substitute for professional medical care. Always follow your healthcare professional's instructions.

## 2020-12-01 ENCOUNTER — TELEPHONE (OUTPATIENT)
Dept: PAIN MEDICINE | Facility: CLINIC | Age: 80
End: 2020-12-01

## 2020-12-01 ENCOUNTER — OFFICE VISIT (OUTPATIENT)
Dept: CARDIOLOGY | Facility: CLINIC | Age: 80
End: 2020-12-01
Payer: MEDICARE

## 2020-12-01 VITALS
DIASTOLIC BLOOD PRESSURE: 90 MMHG | WEIGHT: 221.81 LBS | HEART RATE: 78 BPM | HEIGHT: 68 IN | SYSTOLIC BLOOD PRESSURE: 162 MMHG | BODY MASS INDEX: 33.62 KG/M2

## 2020-12-01 DIAGNOSIS — R07.9 CHEST PAIN, UNSPECIFIED TYPE: ICD-10-CM

## 2020-12-01 DIAGNOSIS — I25.119 CORONARY ARTERY DISEASE INVOLVING NATIVE CORONARY ARTERY OF NATIVE HEART WITH ANGINA PECTORIS: ICD-10-CM

## 2020-12-01 DIAGNOSIS — K57.30 DIVERTICULOSIS OF LARGE INTESTINE WITHOUT HEMORRHAGE: ICD-10-CM

## 2020-12-01 DIAGNOSIS — E78.00 HYPERCHOLESTEROLEMIA: ICD-10-CM

## 2020-12-01 DIAGNOSIS — E11.42 TYPE 2 DIABETES MELLITUS WITH DIABETIC POLYNEUROPATHY, WITH LONG-TERM CURRENT USE OF INSULIN: ICD-10-CM

## 2020-12-01 DIAGNOSIS — M10.9 GOUT, ARTHRITIS: ICD-10-CM

## 2020-12-01 DIAGNOSIS — E11.42 DIABETIC POLYNEUROPATHY ASSOCIATED WITH TYPE 2 DIABETES MELLITUS: ICD-10-CM

## 2020-12-01 DIAGNOSIS — E21.3 HYPERPARATHYROIDISM: ICD-10-CM

## 2020-12-01 DIAGNOSIS — M48.062 SPINAL STENOSIS OF LUMBAR REGION WITH NEUROGENIC CLAUDICATION: ICD-10-CM

## 2020-12-01 DIAGNOSIS — E66.9 OBESITY (BMI 30.0-34.9): ICD-10-CM

## 2020-12-01 DIAGNOSIS — R60.0 BILATERAL LEG EDEMA: ICD-10-CM

## 2020-12-01 DIAGNOSIS — I10 ESSENTIAL HYPERTENSION: Chronic | ICD-10-CM

## 2020-12-01 DIAGNOSIS — R26.9 ABNORMALITY OF GAIT AND MOBILITY: ICD-10-CM

## 2020-12-01 DIAGNOSIS — K21.9 GASTROESOPHAGEAL REFLUX DISEASE WITHOUT ESOPHAGITIS: ICD-10-CM

## 2020-12-01 DIAGNOSIS — G47.33 OSA ON CPAP: Chronic | ICD-10-CM

## 2020-12-01 DIAGNOSIS — G51.0 FACIAL NERVE PALSY, SECONDARY: Chronic | ICD-10-CM

## 2020-12-01 DIAGNOSIS — E78.2 MIXED HYPERLIPIDEMIA: ICD-10-CM

## 2020-12-01 DIAGNOSIS — M79.2 NEUROPATHIC PAIN: Primary | ICD-10-CM

## 2020-12-01 DIAGNOSIS — Z79.4 TYPE 2 DIABETES MELLITUS WITH DIABETIC POLYNEUROPATHY, WITH LONG-TERM CURRENT USE OF INSULIN: ICD-10-CM

## 2020-12-01 DIAGNOSIS — F39 MOOD DISORDER: ICD-10-CM

## 2020-12-01 PROCEDURE — 99999 PR PBB SHADOW E&M-EST. PATIENT-LVL III: ICD-10-PCS | Mod: PBBFAC,HCNC,, | Performed by: INTERNAL MEDICINE

## 2020-12-01 PROCEDURE — 99999 PR PBB SHADOW E&M-EST. PATIENT-LVL III: CPT | Mod: PBBFAC,HCNC,, | Performed by: INTERNAL MEDICINE

## 2020-12-01 RX ORDER — ATORVASTATIN CALCIUM 40 MG/1
40 TABLET, FILM COATED ORAL DAILY
Qty: 90 TABLET | Refills: 3 | Status: SHIPPED | OUTPATIENT
Start: 2020-12-01 | End: 2021-09-16

## 2020-12-01 NOTE — PATIENT INSTRUCTIONS
1. BLE pain (Left > Right) - Likely etiology is neuropathic pain and possible contribution from peripheral arterial disease. Continue asa 81 mg and start Atorvastatin 40 mg. Refer to neurology.     2. BLE edema- get ultrasound of legs     Follow up in 2 weeks.

## 2020-12-01 NOTE — PROGRESS NOTES
[facility]         Chief Complaint   Patient presents with    BLE pain        Subjective   Patient ID: Mr. Henrietta Villasenor is a 80-year-old female with non obstructive CAD, IDDM2, hypertension, morbid obesity, peripheral neuropathy, osteoarthritis s/p LTKA, who presents for a initial appointment.    -Patient  kindly referred to Vascular medicine clinic by Andreina Aguiar PA-C for evaluation of lower extremity pain.     HPI:  Mrs. Villasenor reports progressive worsening of BLE pain and swelling in her legs for past 2 months. She reports no trauma to legs.  She reports both an aching in her thigh, knee and shin as well as sharp, stabbing intermittent pain radiating from her left lower back down her posterior leg.  She tkes Tylenol at home which helps alleviate the her pain slightly.  She reports associated leg swelling. Patient reports no claudication, CLI, tissue loss, pigmentation changes. She has no history of DVT/PE, no recent surgeries. she was seen in pain management clinic for epidural steroid injection in early October.     Patient was seen in the ED on 11/23/20 due to the leg pain. X-rays of hip and knees (11/23/20) revealed no acute malalignment, loosening or displaced fracture-dislocation. BLE Venous Ultrasound (11/23/20) revealed no evidence of DVT, incidental finding of atherosclerosis of popliteal artery was noted.  BALA revealed (11/23/20) BALA of 0.88 on the right indicating mild peripheral artery disease.  Left lower extremity BALA is within normal limits at 0.99.  No hemodynamically significant stenosis demonstrated in the left lower extremity arterial system. Patient was discharged from ED.     Past Medical History:   Diagnosis Date    Abnormality of lung 11/08/2011    Stable bandlike opacities at the lung bases, most likely representing      Anxiety     Arthritis     CAD (coronary artery disease)     Calculus of ureter 2/22/2011    CHF (congestive heart failure)     Chronic back pain  7/29/2012    Colon polyp 9/2010; 11/2010    Colon polyps 7/29/2012    Coronary artery disease involving native coronary artery of native heart with angina pectoris     Depression     Diabetes mellitus     Diabetes mellitus type II     Diverticulitis large intestine 7/27/2015    Diverticulitis of large intestine without perforation or abscess without bleeding     Diverticulosis 09/25/2010; 11/02/2010; 11/08/2011; 7/29/2012    Duodenal disorder 08/25/2011    Duodenal erosion noted on EGD.    Duodenal ulcer, unspecified as acute or chronic, without hemorrhage, perforation, or obstruction 8/24/2011    E. coli sepsis 12/2010    Due to left ureteral stone with left nephrostomy tube - hospitalized in [place]    Esophageal dysmotility 01/24/2012    Noted on upper GI-barium swallow.    Facial weakness 1969    Left facial weakness s/p left mastoidectomy in ~ 1969.    Fatty liver 11/08/2011    Reported on CT-abdomen and in 06/2012 Gastro clinic visit note.    Gastric polyp 09/29/2010    GERD (gastroesophageal reflux disease) 7/29/2012    Hepatomegaly 11/08/2011    Reported on CT-abdomen    Herpes zoster with other nervous system complications(053.19) 2/28/2011    Hiatal hernia 06/26/2006; 09/29/2010; 08/25/2011    Noted on barium swallow 2006; noted on  EGD 2011.   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    Trouble in sleeping     Type II or unspecified type diabetes mellitus with neurological manifestations, not stated as uncontrolled(250.60)     Type II or unspecified type diabetes mellitus with peripheral circulatory disorders, not stated as uncontrolled(250.70)     Type II or unspecified type diabetes mellitus with renal manifestations, not stated as uncontrolled(250.40)        Past Surgical History:   Procedure Laterality Date    BELPHAROPTOSIS REPAIR      s/p LAMBERTO levator repair - Dr. Dejesus    CARDIAC CATHETERIZATION      CARPAL TUNNEL RELEASE  3/13/2012    CATARACT EXTRACTION W/  INTRAOCULAR LENS IMPLANT Right 10/31/2018        CATARACT EXTRACTION W/  INTRAOCULAR LENS IMPLANT Left 03/22/2018        CHOLECYSTECTOMY      COLONOSCOPY N/A 11/16/2016    Procedure: COLONOSCOPY;  Surgeon: Chris Storey MD;  [facility]  Service: Endoscopy;  Laterality: N/A;    COLONOSCOPY N/A 6/14/2017    Procedure: COLONOSCOPY;  Surgeon: Chris Storey MD;  [facility]  Service: Endoscopy;  Laterality: N/A;  colonoscopy in 3 months with better bowel prep. - Split PEG prep ordered    EXTRACORPOREAL SHOCK WAVE LITHOTRIPSY      INJECTION OF ANESTHETIC AGENT AROUND NERVE Bilateral 6/24/2020    Procedure: BLOCK, NERVE, C4-C5-C6 MEDIAL BRANCH ok per Talia to add on;  Surgeon: Oscar Ravi MD;  Location: [facility]IN [facility]GT;  Service: Pain Management;  Laterality: Bilateral;    INTRAOCULAR PROSTHESES INSERTION Right 10/31/2018    Procedure: INSERTION-INTRAOCULAR LENS (IOL);  Surgeon: Keysha Valle MD;  Location: [facility] OR;  Service: Ophthalmology;  Laterality: Right;    JOINT REPLACEMENT      mastoid tumor removal  1969    Left mastoidectomy with residual left facial weakness.    NEPHROSTOMY      OOPHORECTOMY      PHACOEMULSIFICATION OF CATARACT Right  10/31/2018    Procedure: PHACOEMULSIFICATION-ASPIRATION-CATARACT;  Surgeon: Keysha Valle MD;  Location: [facility] OR;  Service: Ophthalmology;  Laterality: Right;    TOTAL ABDOMINAL HYSTERECTOMY  1969    TAHBSO    TOTAL KNEE ARTHROPLASTY  9/13/2012    Left    TRANSFORAMINAL EPIDURAL INJECTION OF STEROID Bilateral 9/17/2019    Procedure: INJECTION, STEROID, EPIDURAL, TRANSFORAMINAL APPROACH;  Surgeon: Oscar Ravi MD;  Location: [facility];  Service: Pain Management;  Laterality: Bilateral;  B/L TFESI L4/L5    TRANSFORAMINAL EPIDURAL INJECTION OF STEROID Bilateral 10/2/2020    Procedure: INJECTION, STEROID, EPIDURAL, TRANSFORAMINAL APPROACH;  Surgeon: Oscar Ravi MD;  Location: [facility];  Service: Pain Management;  Laterality: Bilateral;  B/L TFESI L4/5       Family History   Problem Relation Age of Onset    Sleep apnea Sister     Diabetes Sister     Cancer Mother         brain tumor    Hypertension Mother     Heart disease Father     Heart attack Father     Dementia Brother     Lupus Brother     Frontotemporal dementia Brother     No Known Problems Daughter     No Known Problems Son     Diabetes Sister     Lupus Sister     Breast cancer Sister     Diabetes Sister     Cancer Sister         breast cancer    Heart attack Sister     Diabetes Sister     Liver cancer Brother     Drug abuse Brother     Alcohol abuse Brother     Cirrhosis Brother     Drug abuse Brother     No Known Problems Daughter     No Known Problems Son     No Known Problems Son     No Known Problems Maternal Grandmother     No Known Problems Maternal Grandfather     No Known Problems Paternal Grandmother     No Known Problems Paternal Grandfather     Diabetes Brother     Dementia Brother     Diabetes Other     Ovarian cancer Other         Niece     Breast cancer Maternal Aunt     No Known Problems Maternal Uncle     No Known Problems Paternal Aunt     No Known Problems Paternal Uncle      Glaucoma Neg Hx     Blindness Neg Hx     Celiac disease Neg Hx     Colon cancer Neg Hx     Colon polyps Neg Hx     Esophageal cancer Neg Hx     Inflammatory bowel disease Neg Hx     Liver disease Neg Hx     Rectal cancer Neg Hx     Stomach cancer Neg Hx     Ulcerative colitis Neg Hx     Melanoma Neg Hx     Multiple sclerosis Neg Hx     Psoriasis Neg Hx     Amblyopia Neg Hx     Cataracts Neg Hx     Macular degeneration Neg Hx     Retinal detachment Neg Hx     Strabismus Neg Hx     Stroke Neg Hx     Thyroid disease Neg Hx        Social History     Tobacco Use    Smoking status: Former Smoker     Packs/day: 1.00     Years: 15.00     Pack years: 15.00     Types: Cigarettes     Quit date: 2000     Years since quittin.3    Smokeless tobacco: Never Used   Substance Use Topics    Alcohol use: No       Review of patient's allergies indicates:   Allergen Reactions    Crestor [rosuvastatin]      Cramping in legs    Ezetimibe Diarrhea     Other reaction(s): abdominal pain, Diarrhea      Hydrocodone Itching    Lisinopril      Other reaction(s): cough      Sulfa (sulfonamide antibiotics) Itching and Rash           Sulfamethoxazole     Sulfamethoxazole-trimethoprim     Valsartan Swelling       Current Outpatient Medications on File Prior to Visit   Medication Sig Dispense Refill    ACCU-CHEK GUIDE TEST STRIPS Strp USE TO CHECK BLOOD SUGAR THREE TIMES DAILY 150 strip 4    ACCU-CHEK SMARTVIEW TEST STRIP Strp TEST THREE TIMES DAILY 300 strip 3    albuterol (VENTOLIN HFA) 90 mcg/actuation inhaler Inhale 2 puffs by mouth into the lungs every 4 (four) hours as needed for Wheezing. Rescue 18 g 1    allopurinoL (ZYLOPRIM) 100 MG tablet Take 1 tablet (100 mg total) by mouth once daily. 90 tablet 3    amLODIPine (NORVASC) 5 MG tablet Take 1 tablet (5 mg total) by mouth once daily. 90 tablet 3    aspirin (ECOTRIN) 81 MG EC tablet Take 1 tablet by mouth Daily.      atorvastatin (LIPITOR) 40 MG  "tablet TAKE 1 TABLET EVERY DAY 90 tablet 3    azelastine (ASTELIN) 137 mcg (0.1 %) nasal spray Spray 1 spray (137 mcg total) by Nasal route 2 (two) times daily. 30 mL 3    BD ALCOHOL SWABS PadM USE  WHEN  TESTING BLOOD SUGAR FOUR TIMES DAILY 400 each 3    BD ULTRA-FINE OLU PEN NEEDLES 32 x 5/32 " Ndle Uses 4 times daily, on multiple daily insulin injections 130 each 12    blood-glucose meter kit Use as instructed 1 each 0    carbamide peroxide (EAR WAX REMOVAL DROPS OTIC)       carvediloL (COREG) 25 MG tablet Take 1 tablet (25 mg total) by mouth 2 (two) times daily. 180 tablet 3    cholecalciferol, vitamin D3, (DIALYVITE VITAMIN D) 5,000 unit capsule Take 5,000 Units by mouth once daily.      DULoxetine (CYMBALTA) 60 MG capsule Take 1 capsule (60 mg total) by mouth 2 (two) times daily. 180 capsule 3    fluticasone (FLONASE) 50 mcg/actuation nasal spray SHAKE LQ AND U 2 SPRAYS IEN QD 1 Bottle 6    furosemide (LASIX) 40 MG tablet TAKE 1 TABLET BY MOUTH TWICE DAILY 180 tablet 4    insulin (LANTUS SOLOSTAR U-100 INSULIN) glargine 100 units/mL (3mL) SubQ pen Inject 30 Units into the skin 2 (two) times a day. 60 mL 1    insulin aspart U-100 (NOVOLOG) 100 unit/mL (3 mL) InPn pen Inject 24 Units into the skin before breakfast AND 28 Units daily with lunch AND 28 Units before dinner. 120 mL 3    lancets (ACCU-CHEK FASTCLIX LANCING DEV) Misc Lancets and device = check blood sugar 4 times daily 400 each 4    linaGLIPtin (TRADJENTA) 5 mg Tab tablet Take 1 tablet (5 mg total) by mouth once daily. 90 tablet 3    loratadine (CLARITIN) 10 mg tablet Take 1 tablet (10 mg total) by mouth once daily. 30 tablet 4    meclizine (ANTIVERT) 12.5 mg tablet Take 1 tablet (12.5 mg total) by mouth 3 (three) times daily as needed for Dizziness. 30 tablet 0    olopatadine (PATADAY) 0.2 % Drop Place 1 drop into both eyes once daily. 2.5 mL 12    [START ON 12/10/2020] oxyCODONE-acetaminophen (PERCOCET)  mg per tablet Take 1 " "tablet by mouth 5 (five) times daily. 150 tablet 0    pantoprazole (PROTONIX) 40 MG tablet TAKE 1 TABLET(40 MG) BY MOUTH twice daily 180 tablet 4    pen needle, diabetic (SURE-FINE PEN NEEDLES) 29 gauge x 1/2" Ndle Use 4 times daily 400 each 4    rOPINIRole (REQUIP) 0.5 MG tablet TAKE 1 TABLET (0.5 MG TOTAL) BY MOUTH EVERY EVENING. 90 tablet 1    TRUEPLUS LANCETS 33 gauge Misc TEST BLOOD SUGAR FOUR TIMES DAILY 400 each 4     Review of Systems   Constitution: Negative for chills, fever and malaise/fatigue.   HENT: Negative for congestion and hoarse voice.    Eyes: Negative for visual disturbance.   Cardiovascular: Positive for left knee swelling. Negative for dyspnea on exertion, orthopnea and paroxysmal nocturnal dyspnea.   Respiratory: Negative for cough, shortness of breath and sputum production.    Endocrine: Negative for cold intolerance, heat intolerance, polydipsia and polyuria.   Hematologic/Lymphatic: Negative for adenopathy and bleeding problem. Does not bruise/bleed easily.   Skin: Negative for color change, flushing and itching.   Musculoskeletal: Positive for left leg pain. Negative for back pain, joint pain, joint swelling and muscle cramps.   Gastrointestinal: Negative for abdominal pain, constipation, diarrhea and nausea.   Genitourinary: Negative for pelvic pain.   Neurological: Negative for focal weakness, light-headedness, loss of balance, paresthesias, seizures and weakness    Objective   BP (!) 162/90   Pulse 78   Ht 5' 8" (1.727 m)   Wt 100.6 kg (221 lb 12.5 oz)   LMP  (LMP Unknown)   BMI 33.72 kg/m²     Physical Exam   Constitutional: No acute distress, conversant  HEENT: Sclera anicteric, Pupils equal, round and reactive to light, extraocular motions intact, Oropharynx clear  Neck: No JVD, no carotid bruits  Cardiovascular: regular rate and rhythm, no murmur, rubs or gallops, normal S1/S2  Pulmonary: Clear to auscultation bilaterally  Abdominal: Abdomen soft, nontender, nondistended, " positive bowel sounds  Extremities: BLE's with prominent varicose veins and TTP. No significant edema. Left lateral knee pain tender to palpation, no knee effusion, or warmth.   Pulses:  Carotid pulses are 2+ on the right side, and 2+ on the left side.  Radial pulses are 2+ on the right side, and 2+ on the left side.   Femoral pulses are 2+ on the right side, and 2+ on the left side.  Popliteal pulses are 2+ on the right side, and 2+ on the left side.   Dorsalis pedis pulses are 2+ on the right side, and 2+ on the left side.   Posterior tibial pulses are 2+ on the right side, and 2+ on the left side.    Skin: No ecchymosis, erythema, or ulcers  Psych: Alert and oriented x 3, appropriate affect  Neuro: CNII-XII intact, no focal deficits    CMP  Sodium   Date Value Ref Range Status   10/22/2020 143 136 - 145 mmol/L Final     Potassium   Date Value Ref Range Status   10/22/2020 3.5 3.5 - 5.1 mmol/L Final     Chloride   Date Value Ref Range Status   10/22/2020 99 95 - 110 mmol/L Final     CO2   Date Value Ref Range Status   10/22/2020 30 (H) 23 - 29 mmol/L Final     Glucose   Date Value Ref Range Status   10/22/2020 125 (H) 70 - 110 mg/dL Final     BUN   Date Value Ref Range Status   10/22/2020 11 8 - 23 mg/dL Final     Creatinine   Date Value Ref Range Status   10/22/2020 1.3 0.5 - 1.4 mg/dL Final     Calcium   Date Value Ref Range Status   10/22/2020 9.6 8.7 - 10.5 mg/dL Final     Total Protein   Date Value Ref Range Status   10/22/2020 7.9 6.0 - 8.4 g/dL Final     Albumin   Date Value Ref Range Status   10/22/2020 3.4 (L) 3.5 - 5.2 g/dL Final     Total Bilirubin   Date Value Ref Range Status   10/22/2020 0.5 0.1 - 1.0 mg/dL Final     Comment:     For infants and newborns, interpretation of results should be based  on gestational age, weight and in agreement with clinical  observations.  Premature Infant recommended reference ranges:  Up to 24 hours.............<8.0 mg/dL  Up to 48 hours............<12.0 mg/dL  3-5  days..................<15.0 mg/dL  6-29 days.................<15.0 mg/dL       Alkaline Phosphatase   Date Value Ref Range Status   10/22/2020 96 55 - 135 U/L Final     AST   Date Value Ref Range Status   10/22/2020 20 10 - 40 U/L Final     ALT   Date Value Ref Range Status   10/22/2020 13 10 - 44 U/L Final     Anion Gap   Date Value Ref Range Status   10/22/2020 14 8 - 16 mmol/L Final     eGFR if    Date Value Ref Range Status   10/22/2020 44.8 (A) >60 mL/min/1.73 m^2 Final     eGFR if non    Date Value Ref Range Status   10/22/2020 38.8 (A) >60 mL/min/1.73 m^2 Final     Comment:     Calculation used to obtain the estimated glomerular filtration  rate (eGFR) is the CKD-EPI equation.          Lab Results   Component Value Date    WBC 11.96 06/19/2020    HGB 12.1 06/19/2020    HCT 39 11/23/2020    MCV 92 06/19/2020     06/19/2020       Imaging/Diagnostics    XR Hip (11/23/20)  FINDINGS:  Femoral heads appear normally positioned.  No evidence of acute fracture, subluxation or dislocation.  Mild-moderate joint space narrowing of the hips bilaterally.  Mild degenerative osteophytic spurring of the acetabulum bilaterally   SI joints appear intact.  Pubic rami are intact.  Vascular atherosclerosis.  Impression:  No acute radiographic abnormality.     LLE arterial ultrasound (11/23/20)  Impression:  BALA of 0.88 on the right indicating mild peripheral artery disease.  Left lower extremity BALA is within normal limits at 0.99.   No hemodynamically significant stenosis demonstrated in the left lower extremity arterial system.    XR Knees (11/23/20)  Impression:  Remote left TKA without evidence of acute malalignment, loosening or displaced fracture-dislocation.    Assessment and Plan    1. BLE pain (Left > Right) - Pain is likely neuropathic in origin.  Pt has mild PAD, and symptoms are not classic for claudication.  LLE arterial ultrasound on 11/23/2020 revealed BALA of 0.88 on the right  indicating mild peripheral artery disease.  Left lower extremity BALA is within normal limits at 0.99.  No hemodynamically significant stenosis demonstrated in the left lower extremity arterial system.   Refer to neurology for evaluation of neuropathic pain.     2. BLE edema -Likely due to venous insufficiency. Check BLE venous reflux study.  Pt to elevate legs when resting.  Restrict sodium intake up tp 2 g a day and fluid intake to 1.5 L a day.    3. PAD- LLE arterial ultrasound on 11/23/2020 revealed BALA of 0.88 on the right indicating mild peripheral artery disease.  Left lower extremity BALA is within normal limits at 0.99.  No hemodynamically significant stenosis demonstrated in the left lower extremity arterial system.  Continue asa 81 mg and start Atorvastatin 40 mg for medical management of peripheral artery disease.    4. Obesity  - BMI today 33.72 Kg/M2. Encourage dietary modification and moderate/aerobic exercise for a minimum of 30 minutes for 5 days a week.     Follow up in 2 weeks    Total duration of face to face visit time 45 minutes.  Total time spent counseling greater than fifty percent of total visit time.  Counseling included discussion regarding imaging findings, diagnosis, possibilities, treatment options, risks and benefits.  The patient had many questions regarding the options and long-term effects.    Patient seen and discussed with Dr. Reilly. Further recommendations per the attending addendum.        Jeffrey Warner MD  Vascular Medicine Fellow PGY IV  Department of Vascular Medicine  [facility]

## 2020-12-01 NOTE — TELEPHONE ENCOUNTER
Phoned patient in regards to missed appointment. Left message with information regarding rescheduling.

## 2020-12-03 ENCOUNTER — TELEPHONE (OUTPATIENT)
Dept: NEUROLOGY | Facility: CLINIC | Age: 80
End: 2020-12-03

## 2020-12-05 PROBLEM — M79.2 NEUROPATHIC PAIN: Status: ACTIVE | Noted: 2020-12-05

## 2020-12-05 PROBLEM — R60.0 BILATERAL LEG EDEMA: Status: ACTIVE | Noted: 2020-12-05

## 2020-12-07 ENCOUNTER — OUTPATIENT CASE MANAGEMENT (OUTPATIENT)
Dept: ADMINISTRATIVE | Facility: OTHER | Age: 80
End: 2020-12-07

## 2020-12-07 NOTE — PROGRESS NOTES
12/7/2020-1st attempt to contact patient for follow up. Called patient on home and cell, no answer. Unable to Leave voicemail. Will attempt to contact at later date.   Baljeet Ruano RN  Outpatient Care Management

## 2020-12-08 ENCOUNTER — NURSE TRIAGE (OUTPATIENT)
Dept: ADMINISTRATIVE | Facility: CLINIC | Age: 80
End: 2020-12-08

## 2020-12-08 RX ORDER — OXYCODONE AND ACETAMINOPHEN 10; 325 MG/1; MG/1
1 TABLET ORAL
Qty: 150 TABLET | Refills: 0 | Status: SHIPPED | OUTPATIENT
Start: 2020-12-10 | End: 2021-01-11 | Stop reason: SDUPTHER

## 2020-12-08 NOTE — TELEPHONE ENCOUNTER
----- Message from Jamilah Cobos sent at 12/8/2020  3:32 PM CST -----  Regarding: Pt [phone]  Is this a refill or new RX: Refill    RX name and strength: oxyCODONE-acetaminophen (PERCOCET)  mg per tablet    Pharmacy name and phone # [facility] [phone] (Phone)  [phone] (Fax)    Comments: She wants to know if you can filled this tomorrow.

## 2020-12-08 NOTE — TELEPHONE ENCOUNTER
"C/o Lt knee pain, ongoing for quite some time. 8/10, which she describes as her entire leg, no specific area of pain or swelling. States slight swelling, but hardly noticeable compared to Rt leg. States she has not taken any pain medication today. Is able to ambulate, but not for far distances.   Care advice provided per protocol, pt verbalizes understanding.     Reason for Disposition   [1] MODERATE pain (e.g., interferes with normal activities, limping) AND [2] present > 3 days    Additional Information   Negative: Sounds like a life-threatening emergency to the triager   Negative: [1] Swollen joint AND [2] fever   Negative: [1] Red area or streak AND [2] fever   Negative: Patient sounds very sick or weak to the triager   Negative: [1] SEVERE pain (e.g., excruciating, unable to walk) AND [2] not improved after 2 hours of pain medicine   Negative: [1] Can't move swollen joint at all AND [2] no fever   Negative: [1] Thigh or calf pain AND [2] only 1 side AND [3] present > 1 hour   Negative: [1] Thigh, calf, or ankle swelling AND [2] only 1 side   Negative: [1] Looks infected (spreading redness, pus) AND [2] large red area (> 2 in. or 5 cm)   Negative: [1] Very swollen joint AND [2] no fever   Negative: Blistering rash in area of pain  (i.e., dermatomal distribution or "band" or "stripe")   Negative: Looks like a boil, infected sore, or deep ulcer   Negative: [1] Redness of the skin AND [2] no fever    Protocols used: KNEE PAIN-A-AH      "

## 2020-12-14 ENCOUNTER — TELEPHONE (OUTPATIENT)
Dept: INTERNAL MEDICINE | Facility: CLINIC | Age: 80
End: 2020-12-14

## 2020-12-14 DIAGNOSIS — R26.9 GAIT ABNORMALITY: Primary | ICD-10-CM

## 2020-12-14 NOTE — TELEPHONE ENCOUNTER
----- Message from Baljeet Ruano RN sent at 12/14/2020  3:31 PM CST -----  Good Afternoon,      My name is Baljeet and I work with the Outpatient Case Management team.  I have Ms. Villasenor enrolled in [facility] to assist with her medical needs. She is inquiring if she can have orders sent in for a rollator. She reported that she has been experiencing pain in her legs that is causing her to have some difficulty with ambulation. If you feel this is an appropriate request, can you fax an order over to [facility]?  Please feel free to contact me or the patient if you have any additional questions.    Thank you!  Baljeet Ruano RN

## 2020-12-15 ENCOUNTER — HOSPITAL ENCOUNTER (OUTPATIENT)
Dept: CARDIOLOGY | Facility: HOSPITAL | Age: 80
Discharge: HOME | End: 2020-12-15
Attending: STUDENT IN AN ORGANIZED HEALTH CARE EDUCATION/TRAINING PROGRAM
Payer: MEDICARE

## 2020-12-15 ENCOUNTER — HOSPITAL ENCOUNTER (OUTPATIENT)
Dept: RADIOLOGY | Facility: HOSPITAL | Age: 80
Discharge: HOME | End: 2020-12-15
Attending: STUDENT IN AN ORGANIZED HEALTH CARE EDUCATION/TRAINING PROGRAM
Payer: MEDICARE

## 2020-12-15 DIAGNOSIS — R60.0 BILATERAL LEG EDEMA: ICD-10-CM

## 2020-12-15 DIAGNOSIS — R07.9 ACUTE CHEST PAIN: ICD-10-CM

## 2020-12-15 LAB
LEFT GREAT SAPHENOUS DISTAL THIGH DIA: 0.27 CM
LEFT GREAT SAPHENOUS JUNCTION DIA: 0.88 CM
LEFT GREAT SAPHENOUS KNEE DIA: 0.27 CM
LEFT GREAT SAPHENOUS MIDDLE THIGH DIA: 0.5 CM
LEFT GREAT SAPHENOUS PROXIMAL CALF DIA: 0.28 CM
LEFT SMALL SAPHENOUS KNEE DIA: 0.47 CM
LEFT SMALL SAPHENOUS SPJ DIA: 0.28 CM
RIGHT GIAC DIA: 0.26 MM
RIGHT GREAT SAPHENOUS DISTAL THIGH DIA: 0.16 CM
RIGHT GREAT SAPHENOUS JUNCTION DIA: 0.64 CM
RIGHT GREAT SAPHENOUS KNEE DIA: 0.16 CM
RIGHT GREAT SAPHENOUS MIDDLE THIGH DIA: 0.28 CM
RIGHT GREAT SAPHENOUS PROXIMAL CALF DIA: 0.19 CM
RIGHT GREAT SAPHENOUS PROXIMAL CALF REFLUX: 725 MS

## 2020-12-15 PROCEDURE — 71275 CT ANGIOGRAPHY CHEST: CPT | Mod: 26,HCNC,, | Performed by: RADIOLOGY

## 2020-12-15 PROCEDURE — 25500020 PHARM REV CODE 255: Mod: HCNC | Performed by: STUDENT IN AN ORGANIZED HEALTH CARE EDUCATION/TRAINING PROGRAM

## 2020-12-15 PROCEDURE — 71275 CT ANGIOGRAPHY CHEST: CPT | Mod: TC,HCNC

## 2020-12-15 PROCEDURE — 93970 EXTREMITY STUDY: CPT | Mod: TC,HCNC

## 2020-12-15 PROCEDURE — 71275 CTA CHEST NON CORONARY: ICD-10-PCS | Mod: 26,HCNC,, | Performed by: RADIOLOGY

## 2020-12-15 PROCEDURE — 93970 CV US LOWER VENOUS INSUFFICIENCY BILATERAL (CUPID ONLY): ICD-10-PCS | Mod: 26,HCNC,, | Performed by: INTERNAL MEDICINE

## 2020-12-15 PROCEDURE — 93970 EXTREMITY STUDY: CPT | Mod: 26,HCNC,, | Performed by: INTERNAL MEDICINE

## 2020-12-15 RX ADMIN — IOHEXOL 75 ML: 350 INJECTION, SOLUTION INTRAVENOUS at 06:12

## 2020-12-15 NOTE — PROGRESS NOTES
Mrs. Villasenor presents today for vascular study - venous ultrasound of bilateral lower extremities at [facility].   Called by vascular technologist to evaluate the US image findings.   Dr. Mon and I examined and evaluated the patient in the vascular lab today at 1400 on 12/15/20.   She was noted to have acute DVT of bilateral popliteal veins bilaterally.  Patient continues to report pain in bilateral lower extremities (as on her clinic visit on 12/01/20).  Also, reports mild intermittent dyspnea with exertion.  Reports no chest pain, lightheadness or palpitations.    Physical exam significant for TTP bilateral lower extremities, cardiopulmonary exam was unremarkable.   Labs reviewed. Creatine 1.0 baseline.    Assessment: Acute popliteal vein DVT bilateral lower extremities. Given patient reports mild intermittent dyspnea, will evaluate for pulmonary embolism.     Plan  -- Check CTA chest with contrast to evaluate Pulmonary embolism.   -- Start apixaban 10 mg BID x 7 days, and 5 mg BID thereafter.          Jeffrey Warner MD  Vascular Medicine Fellow PGY IV  Department of Vascular Medicine  [facility]

## 2020-12-17 LAB
CREAT SERPL-MCNC: 1.3 MG/DL (ref 0.5–1.4)
SAMPLE: NORMAL

## 2020-12-19 ENCOUNTER — HOSPITAL ENCOUNTER (EMERGENCY)
Facility: HOSPITAL | Age: 80
Discharge: HOME | End: 2020-12-19
Attending: EMERGENCY MEDICINE
Payer: MEDICARE

## 2020-12-19 VITALS
DIASTOLIC BLOOD PRESSURE: 94 MMHG | HEIGHT: 68 IN | BODY MASS INDEX: 33.49 KG/M2 | HEART RATE: 99 BPM | OXYGEN SATURATION: 99 % | WEIGHT: 221 LBS | RESPIRATION RATE: 19 BRPM | SYSTOLIC BLOOD PRESSURE: 198 MMHG | TEMPERATURE: 99 F

## 2020-12-19 DIAGNOSIS — M54.2 NECK PAIN: Primary | ICD-10-CM

## 2020-12-19 DIAGNOSIS — R06.02 SHORTNESS OF BREATH: ICD-10-CM

## 2020-12-19 PROCEDURE — 93005 ELECTROCARDIOGRAM TRACING: CPT | Mod: HCNC

## 2020-12-19 PROCEDURE — 93010 EKG 12-LEAD: ICD-10-PCS | Mod: HCNC,,, | Performed by: INTERNAL MEDICINE

## 2020-12-19 PROCEDURE — 99283 EMERGENCY DEPT VISIT LOW MDM: CPT | Mod: 25,HCNC

## 2020-12-19 PROCEDURE — 93010 ELECTROCARDIOGRAM REPORT: CPT | Mod: HCNC,,, | Performed by: INTERNAL MEDICINE

## 2020-12-19 PROCEDURE — 99284 EMERGENCY DEPT VISIT MOD MDM: CPT | Mod: ,,, | Performed by: EMERGENCY MEDICINE

## 2020-12-19 PROCEDURE — 99284 PR EMERGENCY DEPT VISIT,LEVEL IV: ICD-10-PCS | Mod: ,,, | Performed by: EMERGENCY MEDICINE

## 2020-12-19 PROCEDURE — 25000003 PHARM REV CODE 250: Mod: HCNC | Performed by: EMERGENCY MEDICINE

## 2020-12-19 RX ORDER — MORPHINE SULFATE 15 MG/1
15 TABLET ORAL
Status: COMPLETED | OUTPATIENT
Start: 2020-12-19 | End: 2020-12-19

## 2020-12-19 RX ORDER — ACETAMINOPHEN 500 MG
1000 TABLET ORAL
Status: COMPLETED | OUTPATIENT
Start: 2020-12-19 | End: 2020-12-19

## 2020-12-19 RX ADMIN — ACETAMINOPHEN 1000 MG: 500 TABLET ORAL at 07:12

## 2020-12-19 RX ADMIN — MORPHINE SULFATE 15 MG: 15 TABLET ORAL at 07:12

## 2020-12-20 NOTE — ED PROVIDER NOTES
"Encounter Date: 12/19/2020    SCRIBE #1 NOTE: I, Duke Wilder, am scribing for, and in the presence of,  Amy Espinosa MD. I have scribed the entire note.       History     Chief Complaint   Patient presents with    Neck Pain     Patient reports neck pain that radiates into left arm since yesterday evening. Patient denies trauma. Patient endorses mild shortness of breath.     Arm Pain     The patient is a 80 y.o. female with PMHx of HTN, DM, HLD, MARLON, CAD, diverticulosis, migraines, colon polyps, recurrent nephrolithiasis, GERD, CHF, peripheral neuropathy, hepatomegaly, and MI who presents to the ED with a complaint of left sided, constant, severe neck pain that radiates down her left shoulder to her LUE. She has had chronic neck pain in the past, but it has never radiates to the left arm until today, which is what prompted the ED visit. Even light touch to her left neck, shoulder, or arm causes her immense amounts of pain. Denies any numbness, weakness, and tingling. She also states that she noticed a new blue and black colored "knot" under the skin in her proximal left forearm that was not previously there. She noticed this knot earlier today as well. Denies any injuries or traumas to the left arm or neck. She is currently on blood thinners (eliquis) as well as percocet for her chronic pain. She has not taken any pain medications today.    The history is provided by the patient and medical records.     Review of patient's allergies indicates:   Allergen Reactions    Crestor [rosuvastatin]      Cramping in legs    Ezetimibe Diarrhea     Other reaction(s): abdominal pain, Diarrhea      Hydrocodone Itching    Lisinopril      Other reaction(s): cough      Sulfa (sulfonamide antibiotics) Itching and Rash           Sulfamethoxazole     Sulfamethoxazole-trimethoprim     Valsartan Swelling     Past Medical History:   Diagnosis Date    Abnormality of lung 11/08/2011    Stable bandlike opacities at the " lung bases, most likely representing      Anxiety     Arthritis     CAD (coronary artery disease)     Calculus of ureter 2/22/2011    CHF (congestive heart failure)     Chronic back pain 7/29/2012    Colon polyp 9/2010; 11/2010    Colon polyps 7/29/2012    Coronary artery disease involving native coronary artery of native heart with angina pectoris     Depression     Diabetes mellitus     Diabetes mellitus type II     Diverticulitis large intestine 7/27/2015    Diverticulitis of large intestine without perforation or abscess without bleeding     Diverticulosis 09/25/2010; 11/02/2010; 11/08/2011; 7/29/2012    Duodenal disorder 08/25/2011    Duodenal erosion noted on EGD.    Duodenal ulcer, unspecified as acute or chronic, without hemorrhage, perforation, or obstruction 8/24/2011    E. coli sepsis 12/2010    Due to left ureteral stone with left nephrostomy tube - hospitalized in [place]    Esophageal dysmotility 01/24/2012    Noted on upper GI-barium swallow.    Facial weakness 1969    Left facial weakness s/p left mastoidectomy in ~ 1969.    Fatty liver 11/08/2011    Reported on CT-abdomen and in 06/2012 Gastro clinic visit note.    Gastric polyp 09/29/2010    GERD (gastroesophageal reflux disease) 7/29/2012    Hepatomegaly 11/08/2011    Reported on CT-abdomen    Herpes zoster with other nervous system complications(053.19) 2/28/2011    Hiatal hernia 06/26/2006; 09/29/2010; 08/25/2011    Noted on barium swallow 2006; noted on  EGD 2011.   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    Trouble in sleeping     Type II or unspecified type diabetes mellitus with neurological manifestations, not stated as uncontrolled(250.60)     Type II or unspecified type diabetes mellitus with peripheral circulatory disorders, not stated as uncontrolled(250.70)     Type II or unspecified type diabetes mellitus with renal manifestations, not stated as uncontrolled(250.40)      Past Surgical History:   Procedure Laterality Date    BELPHAROPTOSIS REPAIR      s/p LAMBERTO levator repair - Dr. Dejesus    CARDIAC CATHETERIZATION      CARPAL TUNNEL RELEASE  3/13/2012    CATARACT EXTRACTION W/  INTRAOCULAR LENS IMPLANT Right 10/31/2018        CATARACT EXTRACTION W/  INTRAOCULAR LENS IMPLANT Left 03/22/2018        CHOLECYSTECTOMY      COLONOSCOPY N/A 11/16/2016    Procedure: COLONOSCOPY;  Surgeon: Chris Storey MD;  Location: [facility] ([address] [place] [address]);  Service: Endoscopy;  Laterality: N/A;    COLONOSCOPY N/A 6/14/2017    Procedure: COLONOSCOPY;  Surgeon: Chris Storey MD;  [facility]  Service: Endoscopy;  Laterality: N/A;  colonoscopy in 3 months with better bowel prep. - Split PEG prep ordered    EXTRACORPOREAL SHOCK WAVE LITHOTRIPSY      INJECTION OF ANESTHETIC AGENT AROUND NERVE Bilateral 6/24/2020    Procedure: BLOCK, NERVE, C4-C5-C6 MEDIAL BRANCH ok per Douglas to add on;  Surgeon: Oscar Ravi MD;  Location: [facility] PAIN MGT;  Service: Pain Management;  Laterality: Bilateral;    INTRAOCULAR PROSTHESES INSERTION Right 10/31/2018    Procedure: INSERTION-INTRAOCULAR LENS (IOL);  Surgeon: Keysha Valle MD;  Location: [facility] OR;  Service: Ophthalmology;  Laterality: Right;     JOINT REPLACEMENT      mastoid tumor removal  1969    Left mastoidectomy with residual left facial weakness.    NEPHROSTOMY      OOPHORECTOMY      PHACOEMULSIFICATION OF CATARACT Right 10/31/2018    Procedure: PHACOEMULSIFICATION-ASPIRATION-CATARACT;  Surgeon: Keysha Valle MD;  Location: [facility] OR;  Service: Ophthalmology;  Laterality: Right;    TOTAL ABDOMINAL HYSTERECTOMY  1969    TAHBSO    TOTAL KNEE ARTHROPLASTY  9/13/2012    Left    TRANSFORAMINAL EPIDURAL INJECTION OF STEROID Bilateral 9/17/2019    Procedure: INJECTION, STEROID, EPIDURAL, TRANSFORAMINAL APPROACH;  Surgeon: Oscar Ravi MD;  Location: [facility] PAIN MGT;  Service: Pain Management;  Laterality: Bilateral;  B/L TFESI L4/L5    TRANSFORAMINAL EPIDURAL INJECTION OF STEROID Bilateral 10/2/2020    Procedure: INJECTION, STEROID, EPIDURAL, TRANSFORAMINAL APPROACH;  Surgeon: Oscar Ravi MD;  Location: [facility] PAIN MGT;  Service: Pain Management;  Laterality: Bilateral;  B/L TFESI L4/5     Family History   Problem Relation Age of Onset    Sleep apnea Sister     Diabetes Sister     Cancer Mother         brain tumor    Hypertension Mother     Heart disease Father     Heart attack Father     Dementia Brother     Lupus Brother     Frontotemporal dementia Brother     No Known Problems Daughter     No Known Problems Son     Diabetes Sister     Lupus Sister     Breast cancer Sister     Diabetes Sister     Cancer Sister         breast cancer    Heart attack Sister     Diabetes Sister     Liver cancer Brother     Drug abuse Brother     Alcohol abuse Brother     Cirrhosis Brother     Drug abuse Brother     No Known Problems Daughter     No Known Problems Son     No Known Problems Son     No Known Problems Maternal Grandmother     No Known Problems Maternal Grandfather     No Known Problems Paternal Grandmother     No Known Problems Paternal Grandfather     Diabetes Brother     Dementia Brother     Diabetes Other      "Ovarian cancer Other         Niece     Breast cancer Maternal Aunt     No Known Problems Maternal Uncle     No Known Problems Paternal Aunt     No Known Problems Paternal Uncle     Glaucoma Neg Hx     Blindness Neg Hx     Celiac disease Neg Hx     Colon cancer Neg Hx     Colon polyps Neg Hx     Esophageal cancer Neg Hx     Inflammatory bowel disease Neg Hx     Liver disease Neg Hx     Rectal cancer Neg Hx     Stomach cancer Neg Hx     Ulcerative colitis Neg Hx     Melanoma Neg Hx     Multiple sclerosis Neg Hx     Psoriasis Neg Hx     Amblyopia Neg Hx     Cataracts Neg Hx     Macular degeneration Neg Hx     Retinal detachment Neg Hx     Strabismus Neg Hx     Stroke Neg Hx     Thyroid disease Neg Hx      Social History     Tobacco Use    Smoking status: Former Smoker     Packs/day: 1.00     Years: 15.00     Pack years: 15.00     Types: Cigarettes     Quit date: 2000     Years since quittin.4    Smokeless tobacco: Never Used   Substance Use Topics    Alcohol use: No    Drug use: No     Review of Systems   Constitutional: Negative for fever.   HENT: Negative for sore throat.    Respiratory: Negative for chest tightness and shortness of breath.    Cardiovascular: Negative for chest pain.   Gastrointestinal: Negative for nausea.   Genitourinary: Negative for dysuria.   Musculoskeletal: Positive for neck pain (left, radiates to shoulder and arm). Negative for back pain.   Skin: Positive for color change (blue/black "knot" in the proximal left forearm). Negative for rash.   Neurological: Negative for weakness, numbness and headaches.   Hematological: Does not bruise/bleed easily.       Physical Exam     Initial Vitals [20 1818]   BP Pulse Resp Temp SpO2   (!) 198/94 99 19 98.6 °F (37 °C) 99 %      MAP       --         Physical Exam    Nursing note and vitals reviewed.  Constitutional: Vital signs are normal. She appears well-developed and well-nourished. She is not diaphoretic.  " Non-toxic appearance. She does not appear ill. No distress.   Nontoxic appearing.   HENT:   Head: Normocephalic and atraumatic.   Nose: Nose normal.   Mouth/Throat: Oropharynx is clear and moist and mucous membranes are normal.   Eyes: Conjunctivae and lids are normal.   Neck: Normal range of motion. Neck supple.   Point tenderness with any palpation of the skin over the neck, but ROM is normal.   Cardiovascular:   2+ radial pulses bilaterally.   Pulmonary/Chest: No respiratory distress.   Abdominal: Normal appearance. She exhibits no ascites.   Musculoskeletal: No edema.      Comments: No bony tenderness over the LUE.   Neurological: She is alert and oriented to person, place, and time. She has normal strength. No cranial nerve deficit.   Strength in UE is 5/5 bilaterally. Sensation intact throughout.   Skin: Skin is warm, dry and intact. No rash noted. No pallor.   3x3cm area of firm mass that is tender with overlying ecchymosis in the proximal left forearm.   Psychiatric: She has a normal mood and affect. Her speech is normal and behavior is normal. She is not actively hallucinating. She is attentive.         ED Course   Procedures  Labs Reviewed - No data to display     ECG Results          EKG 12-lead (Final result)  Result time 12/20/20 11:11:18    Final result by Interface, Lab In [facility] (12/20/20 11:11:18)                 Narrative:    Test Reason : R06.02,    Vent. Rate : 097 BPM     Atrial Rate : 097 BPM     P-R Int : 148 ms          QRS Dur : 110 ms      QT Int : 390 ms       P-R-T Axes : 062 -34 129 degrees     QTc Int : 495 ms    Normal sinus rhythm  Left axis deviation  LVH with repolarization abnormality  Prolonged QT  Abnormal ECG  When compared with ECG of 16-JAN-2020 10:43,  T wave inversion no longer evident in Inferior leads  QT has lengthened  Confirmed by NINA ENGEL, HOMEYAR ([id number]) on 12/20/2020 11:11:11 AM    Referred By: JUAN CARLOS   SELF           Confirmed By:KAREN WOOD MD                             Imaging Results    None          Medical Decision Making:   History:   Old Medical Records: I decided to obtain old medical records.  Old Records Summarized: other records.       <> Summary of Records: The patient is followed closely by pain management for severe neck pain (left greater than right) that radiates to the shoulder. She has had injections to her posterior neck which provided her with some moderate pain relief. She is on Robaxin and Biofreeze. She had a CT of her C-spine taken in Jan 2020 when her neck pain first started, and the CT was unremarkable. The patient has a prescription of 150 percocet per month.  Initial Assessment:   81 yo f, complex PMH as above, now here with 2 issues    1. Worsening neck pain w radiation to L UE, no motor/sensory deficits or complaints and neurovascular exam normal  -suspect exacerbation of pt's underlying known neck pain syndrome for which she is followed by pain management.  Gets 150 percocet/month.  Do not suspect ACS/cardiac etiology.  -will give pain control today.  Do not think emergent imaging necessary    2. Left forearm hematoma, small, no signs of infection.  Pt on anticoagulation  -supportive care recommended  Independently Interpreted Test(s):   I have ordered and independently interpreted EKG Reading(s) - see prior notes  Clinical Tests:   Medical Tests: Ordered and Reviewed            Scribe Attestation:   Scribe #1: I performed the above scribed service and the documentation accurately describes the services I performed. I attest to the accuracy of the note.    Attending Attestation:           Physician Attestation for Scribe:  Physician Attestation Statement for Scribe #1: I, Amy Espinosa MD, reviewed documentation, as scribed by Duke Wilder in my presence, and it is both accurate and complete.         I, Dr. Amy Espinosa, personally performed the services described in this documentation. All medical record entries made by the  issa were at my direction and in my presence.  I have reviewed the chart and agree that the record reflects my personal performance and is accurate and complete. Amy Espinosa MD.  7:57 PM 12/20/2020        ED Course as of Dec 20 1701   Sat Dec 19, 2020   2004 Pt reassessed.  Appears more comfortable.  Pt states that she is still in pain.  Long d/w pt about pain management strategy, need for close f/u with pain management service.  Pt verbalized understanding.    [AS]   2007 Pt's BP is 198/94 on arrival.  No HA/vision changes/confusion/cp/sob.  Pt has asymptomatic HTN.  Per most recent formerly [facility] clinical policy, there is no indication for an emergent work-up or treatment in the ED.  The risks of emergently lowering of the pt's BP (iatrogenic stroke/hypotension) would outweigh any potential benefits.   Pt to f/u in outpatient setting with PCP for further management of HTN.  Pt to be given strict return precautions.    [AS]      ED Course User Index  [AS] Amy Espinosa MD            Clinical Impression:       ICD-10-CM ICD-9-CM   1. Neck pain  M54.2 723.1   2. Shortness of breath  R06.02 786.05                          ED Disposition Condition    Discharge Stable        ED Prescriptions     None        Follow-up Information     Follow up With Specialties Details Why Contact Info    Jeannine Arriaga MD Internal Medicine Call in 2 days  [address]  [place] [address]  [phone]      Clover Kirby NP Pain Medicine, Pain Medicine Call in 2 days  [address] [facility]  [address]  [facility] [address]  [phone]                                         Amy Espinosa MD  12/20/20 1701

## 2020-12-20 NOTE — PROVIDER PROGRESS NOTES - EMERGENCY DEPT.
Encounter Date: 12/19/2020    ED Physician Progress Notes         EKG - STEMI Decision  Initial Reading: No STEMI present.

## 2020-12-20 NOTE — ED NOTES
Patient identifiers verified and correct for Ms Villasenor  C/C: Left neck p[ain, left arm swelling per patient SEE NN  APPEARANCE: awake and alert in NAD.Moaning  SKIN: warm, dry and intact. No breakdown or bruising.  MUSCULOSKELETAL: Patient moving all extremities spontaneously, no obvious swelling or deformities noted. Ambulates independently.  RESPIRATORY: Denies shortness of breath.Respirations unlabored.   CARDIAC: Denies CP, 2+ distal pulses; no peripheral edema  ABDOMEN: S/ND/NT, Denies nausea  : voids spontaneously, denies difficulty  Neurologic: AAO x 4; follows commands equal strength in all extremities; denies numbness/tingling. Denies dizziness Positive gen weakness due to pain , worse with turning head,

## 2020-12-20 NOTE — ED TRIAGE NOTES
Patient states left neck pain on set 3-4 months ago, worse since today, has seen pain management  for same neck pain , states pain worse with mvmt and turning head, states left arm swelling. No OTC meds, last Percocet last night. Blood pressure meds at 1000

## 2020-12-21 ENCOUNTER — OFFICE VISIT (OUTPATIENT)
Dept: PAIN MEDICINE | Facility: CLINIC | Age: 80
End: 2020-12-21
Payer: MEDICARE

## 2020-12-21 VITALS
WEIGHT: 220.44 LBS | RESPIRATION RATE: 18 BRPM | DIASTOLIC BLOOD PRESSURE: 87 MMHG | HEART RATE: 90 BPM | HEIGHT: 68 IN | OXYGEN SATURATION: 100 % | TEMPERATURE: 98 F | SYSTOLIC BLOOD PRESSURE: 139 MMHG | BODY MASS INDEX: 33.41 KG/M2

## 2020-12-21 DIAGNOSIS — M47.812 CERVICAL SPONDYLOSIS: Primary | ICD-10-CM

## 2020-12-21 DIAGNOSIS — M79.18 MYOFASCIAL PAIN: ICD-10-CM

## 2020-12-21 DIAGNOSIS — M50.30 DDD (DEGENERATIVE DISC DISEASE), CERVICAL: ICD-10-CM

## 2020-12-21 PROCEDURE — 96372 THER/PROPH/DIAG INJ SC/IM: CPT | Mod: HCNC,S$GLB,, | Performed by: NURSE PRACTITIONER

## 2020-12-21 PROCEDURE — 99999 PR PBB SHADOW E&M-EST. PATIENT-LVL V: ICD-10-PCS | Mod: PBBFAC,HCNC,, | Performed by: NURSE PRACTITIONER

## 2020-12-21 PROCEDURE — 3072F LOW RISK FOR RETINOPATHY: CPT | Mod: HCNC,S$GLB,, | Performed by: NURSE PRACTITIONER

## 2020-12-21 PROCEDURE — 3288F FALL RISK ASSESSMENT DOCD: CPT | Mod: HCNC,CPTII,S$GLB, | Performed by: NURSE PRACTITIONER

## 2020-12-21 PROCEDURE — 1125F PR PAIN SEVERITY QUANTIFIED, PAIN PRESENT: ICD-10-PCS | Mod: HCNC,S$GLB,, | Performed by: NURSE PRACTITIONER

## 2020-12-21 PROCEDURE — 3288F PR FALLS RISK ASSESSMENT DOCUMENTED: ICD-10-PCS | Mod: HCNC,CPTII,S$GLB, | Performed by: NURSE PRACTITIONER

## 2020-12-21 PROCEDURE — 1159F MED LIST DOCD IN RCRD: CPT | Mod: HCNC,S$GLB,, | Performed by: NURSE PRACTITIONER

## 2020-12-21 PROCEDURE — 96372 PR INJECTION,THERAP/PROPH/DIAG2ST, IM OR SUBCUT: ICD-10-PCS | Mod: HCNC,S$GLB,, | Performed by: NURSE PRACTITIONER

## 2020-12-21 PROCEDURE — 3079F DIAST BP 80-89 MM HG: CPT | Mod: HCNC,CPTII,S$GLB, | Performed by: NURSE PRACTITIONER

## 2020-12-21 PROCEDURE — 1125F AMNT PAIN NOTED PAIN PRSNT: CPT | Mod: HCNC,S$GLB,, | Performed by: NURSE PRACTITIONER

## 2020-12-21 PROCEDURE — 99999 PR PBB SHADOW E&M-EST. PATIENT-LVL V: CPT | Mod: PBBFAC,HCNC,, | Performed by: NURSE PRACTITIONER

## 2020-12-21 PROCEDURE — 3079F PR MOST RECENT DIASTOLIC BLOOD PRESSURE 80-89 MM HG: ICD-10-PCS | Mod: HCNC,CPTII,S$GLB, | Performed by: NURSE PRACTITIONER

## 2020-12-21 PROCEDURE — 3072F PR LOW RISK FOR RETINOPATHY: ICD-10-PCS | Mod: HCNC,S$GLB,, | Performed by: NURSE PRACTITIONER

## 2020-12-21 PROCEDURE — 3075F SYST BP GE 130 - 139MM HG: CPT | Mod: HCNC,CPTII,S$GLB, | Performed by: NURSE PRACTITIONER

## 2020-12-21 PROCEDURE — 1101F PR PT FALLS ASSESS DOC 0-1 FALLS W/OUT INJ PAST YR: ICD-10-PCS | Mod: HCNC,CPTII,S$GLB, | Performed by: NURSE PRACTITIONER

## 2020-12-21 PROCEDURE — 1101F PT FALLS ASSESS-DOCD LE1/YR: CPT | Mod: HCNC,CPTII,S$GLB, | Performed by: NURSE PRACTITIONER

## 2020-12-21 PROCEDURE — 99213 PR OFFICE/OUTPT VISIT, EST, LEVL III, 20-29 MIN: ICD-10-PCS | Mod: 25,HCNC,S$GLB, | Performed by: NURSE PRACTITIONER

## 2020-12-21 PROCEDURE — 99213 OFFICE O/P EST LOW 20 MIN: CPT | Mod: 25,HCNC,S$GLB, | Performed by: NURSE PRACTITIONER

## 2020-12-21 PROCEDURE — 3075F PR MOST RECENT SYSTOLIC BLOOD PRESS GE 130-139MM HG: ICD-10-PCS | Mod: HCNC,CPTII,S$GLB, | Performed by: NURSE PRACTITIONER

## 2020-12-21 PROCEDURE — 1159F PR MEDICATION LIST DOCUMENTED IN MEDICAL RECORD: ICD-10-PCS | Mod: HCNC,S$GLB,, | Performed by: NURSE PRACTITIONER

## 2020-12-21 RX ORDER — ROPINIROLE 0.5 MG/1
TABLET, FILM COATED ORAL
Qty: 90 TABLET | Refills: 1 | Status: SHIPPED | OUTPATIENT
Start: 2020-12-21 | End: 2021-06-30

## 2020-12-21 RX ORDER — BETAMETHASONE SODIUM PHOSPHATE AND BETAMETHASONE ACETATE 3; 3 MG/ML; MG/ML
6 INJECTION, SUSPENSION INTRA-ARTICULAR; INTRALESIONAL; INTRAMUSCULAR; SOFT TISSUE
Status: COMPLETED | OUTPATIENT
Start: 2020-12-21 | End: 2020-12-21

## 2020-12-21 RX ADMIN — BETAMETHASONE SODIUM PHOSPHATE AND BETAMETHASONE ACETATE 6 MG: 3; 3 INJECTION, SUSPENSION INTRA-ARTICULAR; INTRALESIONAL; INTRAMUSCULAR; SOFT TISSUE at 04:12

## 2020-12-21 NOTE — PROGRESS NOTES
Refill Routing Note   Medication(s) are not appropriate for processing by [facility] for the following reason(s):     - Outside of protocol  [facility] action(s):  Route        Medication reconciliation completed: No   Automatic Epic Generated Protocol Data:        Requested Prescriptions   Pending Prescriptions Disp Refills    rOPINIRole (REQUIP) 0.5 MG tablet [Pharmacy Med Name: ROPINIROLE HYDROCHLORIDE 0.5 MG Tablet] 90 tablet 1     Sig: TAKE 1 TABLET EVERY EVENING.       There is no refill protocol information for this order           Appointments  past 12m or future 3m with PCP    Date Provider   Last Visit   11/13/2020 Jeannine Arriaga MD   Next Visit   2/10/2021 Jeannine Arriaga MD   ED visits in past 90 days: 2        Note composed:5:26 PM 12/21/2020

## 2020-12-21 NOTE — PROGRESS NOTES
Chronic Pain - Established Visit    Referring Physician: No ref. provider found    Chief Complaint:   Chief Complaint   Patient presents with    Neck Pain        SUBJECTIVE:    Interval History 12/21/2020:  The patient returns to clinic today for follow up of neck and back pain. She reports increased neck pain over the last week. She denies any injury. She reports left sided neck pain that radiates into her left shoulder. She describes this pain as sharp and throbbing. She did go to the ER over the weekend for increased pain. She denies any right sided pain. She denies any radicular symptoms. She continues to take Cymbalta and Percocet per her PCP. She denies any other health changes. Her pain today is 10/10.    Interval History 10/16/2020:  The patient returns to clinic today for follow up of neck and low back pain. She is s/p bilateral L4/5 TF JANEE on 10/2/2020. She reports 50% relief of her low back and leg pain. She continues to report low back pain with intermittent radiating pain into her left leg. She continues to report neck pain. She denies any radicular arm pain. Her pain is worse with prolonged activity. She also reports difficulty sleeping due to pain. She is no longer taking Robaxin due to sleepiness. She denies any other health changes. Her pain today is 7/10.    Interval History 9/18/2020:  The patient returns to clinic today for follow up of neck and low back pain. She was scheduled for lumbar JANEE but this was canceled. She continues to report low back pain that radiates down the lateral aspect of both legs to her ankles. She reports increased pain with prolonged activity. She reports increased neck pain that is constant, sharp, and aching. She denies any radiating arm pain. Her pain is worse with turning her head to the side. She reports difficulty sleeping due to pain. She denies any other health changes. Her pain today is 8/10.    Interval History 7/9/2020:  The patient returns to clinic today for  follow up of neck pain. She is s/p bilateral C4,5,6 MBB on 6/24/2020. She reports 90% relief of her neck pain. She continues to report benefit. She reports intermittent neck pain that is tolerable at this time. She denies any radiating arm pain. She does report increased low back pain that intermittently radiates down the posterolateral aspect of her legs to her ankles bilaterally. This pain is worse with activity. She denies any other health changes. Her pain today is 8/10.    Interval History 6/15/2020:  The patient presents for follow up of severe Left>R sided neck pain with radiation into shoulder and states pain does not go below shoulders. Pt was scheduled for B C4,5,6 MBB but secondary to covid she had to reschedule. The patient denies myelopathic symptoms such as handwriting changes or difficulty with buttons/coins/keys. Denies perineal paresthesias, bowel/bladder dysfunction. She states her CBGs are in low 100s at this time. She is taking Robaxin PRN.     Interval History 4/3/2020:  Follow up is for neck pain. She was scheduled for C4, C5, C6 MBB on 3/20/20 but this had to be canceled for now due to the covid epidemic. At last visit her tizanidine prescription was changed to robaxan. She says her pain is tolerable with robaxan and Biofreeze which she purchased over-the-counter.  Denies any numbness, weakness, bowel/bladder changes.     Interval History 3/4/2020:  The patient returns to clinic today for follow up of neck pain. She reports that trigger point injections at last visit provided significant relief until the last week. She reports neck pain that is sharp and aching in nature. She denies any radiating arm pain. Her pain is worse with turning her head to the side. She reports difficulty doing housework due to pain. She is currently taking Zanaflex with limited relief. She does take Percocet per her PCP with benefit. She denies any other health changes. Her pain today is 9/10.        Interval History  1/30/2020:  The patient is here to discuss new onset neck pain.  It is mainly located over the left neck and shoulder area.  She denies radiation past the shoulder.  She denies numbness and tingling.  She denies weakness.  She had a recent ED visit for dizziness.  She had CT scans to rule out clot.  She would like an injection today.  She denies bowel or bladder changes.  Her pain today is 10/10.    Interval History 12/3/2019:  The patient is here for follow up of back pain.  Her pain is radiating down the sides of both legs with numbness.  Her BP remains elevated today.  She is followed by her PCP for this.  She denies SOB or chest pain today.  She thinks that it is always high when she comes to [place] due to walking far from the garage.  She did have benefit with previous bilateral L4-5 TF JANEE.  Her pain today is 4/10.  The patient denies any bowel or bladder incontinence or signs of saddle paresthesia.  The patient denies any major medical changes since last office visit.    Interval History 10/1/2019:  The patient is here for follow up today of back and leg pain.  She is s/p bilateral L4-5 TF JANEE with 50% relief.  She says that her pain is more tolerable.  She has more pain with walking, such as walking to our office today.  She has persistent burning to both feet.  She has a known history of diabetes.  Her pain is worse at night and she describes it as burning.  She has intermittent radiation down the sides of both legs.  Her back pain is greater than her leg pain.  She takes Percocet from her PCP with benefit.  She would like to repeat Healthy Back.  She feels as though this was helpful and would like to restart.  Her pain today is 7/10.  Her blood pressure is elevated today.  She says that she has not taken her medication today because she was rushing to get to her appointment.  She denies headache, SOB, or chest pain.     Initial Encounter:  Henrietta VITA Villasenor presents to the clinic for the evaluation of low  back pain. The pain started and symptoms have been worsening.The pain is located in the low back area and radiates to the bilateral leg swelling.  The pain is described as sharp, shooting, throbbing, tingling and constant and is rated as 5/10. The pain is rated with a score of  5/10 on the BEST day and a score of 8/10 on the WORST day.  Symptoms interfere with daily activity and sleeping. The pain is exacerbated by Standing, Bending, Coughing/Sneezing, Walking, Morning, Lifting and Getting out of bed/chair.  The pain is mitigated by medications. She reports spending 0 hours per day reclining. The patient reports 2 hours of uninterrupted sleep per night.    Patient denies night fever/night sweats, urinary incontinence, bowel incontinence, significant weight loss, significant motor weakness and loss of sensations.    Physical Therapy/Home Exercise: yes      Pain Disability Index Review:  Last 3 PDI Scores 12/21/2020 10/16/2020 9/18/2020   Pain Disability Index (PDI) 50 21 49       Pain Medications:    - Opioids: Percocet (Oxycodone/Acetaminophen)  - Adjuvant Medications: Cymbalta ( Duloxetine) and Tizanidine  - Anti-Coagulants: None  - Others: None     report:  Reviewed and consistent with medication use as prescribed.    Pain Procedures:   9/17/19 Bilateral L4-5 TF JANEE- 50% relief  6/24/2020- Bilateral C4,5,6 MBB-90% relief  10/2/2020- Bilateral L4/5 TF JANEE    Imaging:  CT Cervical Spine 1/2/2020:    FINDINGS:  No evidence of acute cervical spine fracture or dislocation.  Odontoid process is intact.  Craniocervical junction is unremarkable.  Cervical spine alignment is within normal limits.  Prominent multilevel degenerative changes are seen with DISH.    Surrounding soft tissues show no significant abnormalities.  Airway is patent.      Impression       No evidence of acute cervical spine fracture or dislocation.         6/12/18 MRI Lumbar  Narrative     EXAMINATION:  MRI LUMBAR SPINE WITHOUT CONTRAST    CLINICAL  HISTORY:  low back and right leg pain; Other intervertebral disc degeneration, lumbar region    TECHNIQUE:  Multiplanar, multisequence MR images were acquired from the thoracolumbar junction to the sacrum without the administration of contrast.    COMPARISON:  Radiograph 06/05/2018    FINDINGS:  Alignment: Normal.    Vertebrae: Normal marrow signal. No fracture.    Discs: Normal height and signal.    Cord: Normal.  Conus terminates at superior border of L1    Degenerative findings:    T12-L1: Mild broad-based disc bulge without compressive sequelae.    L1-L2: Unremarkable.    L2-L3: Mild broad-based disc bulge with facet arthropathy.  No compressive sequelae.    L3-L4: Broad-based disc bulge and facet arthropathy.  There is mild bilateral neural foraminal narrowing.    L4-L5: Trace anterolisthesis of L4 on L5 with broad-based disc bulge and facet arthropathy.  This results in moderate bilateral neural foraminal narrowing.  Moderate canal stenosis.  There is also significant narrowing of the left greater than right lateral recesses.    L5-S1: Broad-based disc bulge without significant compressive sequelae.    Paraspinal muscles & soft tissues: Paraspinal atrophy.  No concerning findings in the visualized abdomen.  Add      Impression       Moderate degenerative change at L4-5.  Milder degenerative changes elsewhere in the lumbar spine.     6/5/18 Xray Lumbar  Narrative     EXAMINATION:  XR LUMBAR SPINE 5 VIEW WITH FLEX AND EXT    CLINICAL HISTORY:  low back and right leg pain;  Other intervertebral disc degeneration, lumbar region    TECHNIQUE:  Five views of the lumbar spine plus flexion extension views were performed.    COMPARISON:  02/03/2015    FINDINGS:  The lumbar vertebra are intact.  No compression fracture is identified.  Degenerative changes are again seen with small osteophytes at multiple levels.  L4-5 disc space is mildly narrowed.  Degenerative changes are also noted at lower facet joints.  There is  associated grade 1 spondylolisthesis at L4-5 which is stable between flexion and extension at about 6 mm.  Alignment is otherwise satisfactory.    Visualized abdomen shows aortic atherosclerosis and surgical clips in the right upper quadrant.      Impression       No acute abnormality.  Chronic degenerative spine changes.           Past Medical History:   Diagnosis Date    Abnormality of lung 11/08/2011    Stable bandlike opacities at the lung bases, most likely representing      Anxiety     Arthritis     CAD (coronary artery disease)     Calculus of ureter 2/22/2011    CHF (congestive heart failure)     Chronic back pain 7/29/2012    Colon polyp 9/2010; 11/2010    Colon polyps 7/29/2012    Coronary artery disease involving native coronary artery of native heart with angina pectoris     Depression     Diabetes mellitus     Diabetes mellitus type II     Diverticulitis large intestine 7/27/2015    Diverticulitis of large intestine without perforation or abscess without bleeding     Diverticulosis 09/25/2010; 11/02/2010; 11/08/2011; 7/29/2012    Duodenal disorder 08/25/2011    Duodenal erosion noted on EGD.    Duodenal ulcer, unspecified as acute or chronic, without hemorrhage, perforation, or obstruction 8/24/2011    E. coli sepsis 12/2010    Due to left ureteral stone with left nephrostomy tube - hospitalized in [place]    Esophageal dysmotility 01/24/2012    Noted on upper GI-barium swallow.    Facial weakness 1969    Left facial weakness s/p left mastoidectomy in ~ 1969.    Fatty liver 11/08/2011    Reported on CT-abdomen and in 06/2012 Gastro clinic visit note.    Gastric polyp 09/29/2010    GERD (gastroesophageal reflux disease) 7/29/2012    Hepatomegaly 11/08/2011    Reported on CT-abdomen    Herpes zoster with other nervous system complications(053.19) 2/28/2011    Hiatal hernia 06/26/2006; 09/29/2010; 08/25/2011    Noted on barium swallow 2006; noted on  EGD 2011.   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    Trouble in sleeping     Type II or unspecified type diabetes mellitus with neurological manifestations, not stated as uncontrolled(250.60)     Type II or unspecified type diabetes mellitus with peripheral circulatory disorders, not stated as uncontrolled(250.70)     Type II or unspecified type diabetes mellitus with renal manifestations, not stated as uncontrolled(250.40)      Past Surgical History:   Procedure Laterality Date    BELPHAROPTOSIS REPAIR      s/p LAMBERTO levator repair - Dr. Dejesus    CARDIAC CATHETERIZATION      CARPAL TUNNEL RELEASE  3/13/2012    CATARACT EXTRACTION W/  INTRAOCULAR LENS IMPLANT Right 10/31/2018        CATARACT EXTRACTION W/  INTRAOCULAR LENS IMPLANT Left 03/22/2018        CHOLECYSTECTOMY      COLONOSCOPY N/A 11/16/2016    Procedure: COLONOSCOPY;  Surgeon: Chris Storey MD;  Location: [address]);  Service: Endoscopy;  Laterality: N/A;    COLONOSCOPY N/A 6/14/2017    Procedure: COLONOSCOPY;  Surgeon: Chris Storey MD;  Location: [facility] ([address] [place] [address]);  Service: Endoscopy;  Laterality: N/A;  colonoscopy in 3 months with better bowel prep. - Split PEG prep ordered    EXTRACORPOREAL SHOCK WAVE LITHOTRIPSY       INJECTION OF ANESTHETIC AGENT AROUND NERVE Bilateral 6/24/2020    Procedure: BLOCK, NERVE, C4-C5-C6 MEDIAL BRANCH ok per Kinde to add on;  Surgeon: Oscar Ravi MD;  [facility]: [facility];  Service: Pain Management;  Laterality: Bilateral;    INTRAOCULAR PROSTHESES INSERTION Right 10/31/2018    Procedure: INSERTION-INTRAOCULAR LENS (IOL);  Surgeon: Keysha Valle MD;  Location: [facility];  Service: Ophthalmology;  Laterality: Right;    JOINT REPLACEMENT      mastoid tumor removal  1969    Left mastoidectomy with residual left facial weakness.    NEPHROSTOMY      OOPHORECTOMY      PHACOEMULSIFICATION OF CATARACT Right 10/31/2018    Procedure: PHACOEMULSIFICATION-ASPIRATION-CATARACT;  Surgeon: Keysha Valle MD;  Location: [facility];  Service: Ophthalmology;  Laterality: Right;    TOTAL ABDOMINAL HYSTERECTOMY  1969    TAHBSO    TOTAL KNEE ARTHROPLASTY  9/13/2012    Left    TRANSFORAMINAL EPIDURAL INJECTION OF STEROID Bilateral 9/17/2019    Procedure: INJECTION, STEROID, EPIDURAL, TRANSFORAMINAL APPROACH;  Surgeon: Oscar Ravi MD;  [facility]: [facility];  Service: Pain Management;  Laterality: Bilateral;  B/L TFESI L4/L5    TRANSFORAMINAL EPIDURAL INJECTION OF STEROID Bilateral 10/2/2020    Procedure: INJECTION, STEROID, EPIDURAL, TRANSFORAMINAL APPROACH;  Surgeon: Oscar Ravi MD;  [facility];  Service: Pain Management;  Laterality: Bilateral;  B/L TFESI L4/5     Social History     Socioeconomic History    Marital status: Single     Spouse name: Not on file    Number of children: 5    Years of education: Not on file    Highest education level: Not on file   Occupational History    Not on file   Social Needs    Financial resource strain: Somewhat hard    Food insecurity     Worry: Sometimes true     Inability: Often true    Transportation needs     Medical: No     Non-medical: No   Tobacco Use    Smoking status: Former Smoker     Packs/day: 1.00     Years: 15.00      Pack years: 15.00     Types: Cigarettes     Quit date: 2000     Years since quittin.4    Smokeless tobacco: Never Used   Substance and Sexual Activity    Alcohol use: No    Drug use: No    Sexual activity: Never   Lifestyle    Physical activity     Days per week: 0 days     Minutes per session: 0 min    Stress: Very much   Relationships    Social connections     Talks on phone: More than three times a week     Gets together: Once a week     Attends [employer] service: More than 4 times per year     Active member of club or organization: Yes     Attends meetings of clubs or organizations: 1 to 4 times per year     Relationship status: Never    Other Topics Concern    Are you pregnant or think you may be? Not Asked    Breast-feeding Not Asked   Social History Narrative    Single with 5 children. Lives alone. Retired cook.     Family History   Problem Relation Age of Onset    Sleep apnea Sister     Diabetes Sister     Cancer Mother         brain tumor    Hypertension Mother     Heart disease Father     Heart attack Father     Dementia Brother     Lupus Brother     Frontotemporal dementia Brother     No Known Problems Daughter     No Known Problems Son     Diabetes Sister     Lupus Sister     Breast cancer Sister     Diabetes Sister     Cancer Sister         breast cancer    Heart attack Sister     Diabetes Sister     Liver cancer Brother     Drug abuse Brother     Alcohol abuse Brother     Cirrhosis Brother     Drug abuse Brother     No Known Problems Daughter     No Known Problems Son     No Known Problems Son     No Known Problems Maternal Grandmother     No Known Problems Maternal Grandfather     No Known Problems Paternal Grandmother     No Known Problems Paternal Grandfather     Diabetes Brother     Dementia Brother     Diabetes Other     Ovarian cancer Other         Niece     Breast cancer Maternal Aunt     No Known Problems Maternal Uncle     No  "Known Problems Paternal Aunt     No Known Problems Paternal Uncle     Glaucoma Neg Hx     Blindness Neg Hx     Celiac disease Neg Hx     Colon cancer Neg Hx     Colon polyps Neg Hx     Esophageal cancer Neg Hx     Inflammatory bowel disease Neg Hx     Liver disease Neg Hx     Rectal cancer Neg Hx     Stomach cancer Neg Hx     Ulcerative colitis Neg Hx     Melanoma Neg Hx     Multiple sclerosis Neg Hx     Psoriasis Neg Hx     Amblyopia Neg Hx     Cataracts Neg Hx     Macular degeneration Neg Hx     Retinal detachment Neg Hx     Strabismus Neg Hx     Stroke Neg Hx     Thyroid disease Neg Hx        Review of patient's allergies indicates:   Allergen Reactions    Crestor [rosuvastatin]      Cramping in legs    Ezetimibe Diarrhea     Other reaction(s): abdominal pain, Diarrhea      Hydrocodone Itching    Lisinopril      Other reaction(s): cough      Sulfa (sulfonamide antibiotics) Itching and Rash           Sulfamethoxazole     Sulfamethoxazole-trimethoprim     Valsartan Swelling       Current Outpatient Medications   Medication Sig    ACCU-CHEK GUIDE TEST STRIPS Strp USE TO CHECK BLOOD SUGAR THREE TIMES DAILY    ACCU-CHEK SMARTVIEW TEST STRIP Strp TEST THREE TIMES DAILY    albuterol (VENTOLIN HFA) 90 mcg/actuation inhaler Inhale 2 puffs by mouth into the lungs every 4 (four) hours as needed for Wheezing. Rescue    allopurinoL (ZYLOPRIM) 100 MG tablet Take 1 tablet (100 mg total) by mouth once daily.    amLODIPine (NORVASC) 5 MG tablet Take 1 tablet (5 mg total) by mouth once daily.    apixaban (ELIQUIS) 5 mg Tab Take 1 tablet (5 mg total) by mouth 2 (two) times daily.    atorvastatin (LIPITOR) 40 MG tablet Take 1 tablet (40 mg total) by mouth once daily.    BD ALCOHOL SWABS PadM USE  WHEN  TESTING BLOOD SUGAR FOUR TIMES DAILY    BD ULTRA-FINE OLU PEN NEEDLES 32 x 5/32 " Ndle Uses 4 times daily, on multiple daily insulin injections    blood-glucose meter kit Use as instructed    " "carbamide peroxide (EAR WAX REMOVAL DROPS OTIC)     carvediloL (COREG) 25 MG tablet Take 1 tablet (25 mg total) by mouth 2 (two) times daily.    cholecalciferol, vitamin D3, (DIALYVITE VITAMIN D) 5,000 unit capsule Take 5,000 Units by mouth once daily.    DULoxetine (CYMBALTA) 60 MG capsule Take 1 capsule (60 mg total) by mouth 2 (two) times daily.    fluticasone (FLONASE) 50 mcg/actuation nasal spray SHAKE LQ AND U 2 SPRAYS IEN QD    furosemide (LASIX) 40 MG tablet TAKE 1 TABLET BY MOUTH TWICE DAILY    insulin (LANTUS SOLOSTAR U-100 INSULIN) glargine 100 units/mL (3mL) SubQ pen Inject 30 Units into the skin 2 (two) times a day.    insulin aspart U-100 (NOVOLOG) 100 unit/mL (3 mL) InPn pen Inject 24 Units into the skin before breakfast AND 28 Units daily with lunch AND 28 Units before dinner.    lancets (ACCU-CHEK FASTCLIX LANCING DEV) Misc Lancets and device = check blood sugar 4 times daily    linaGLIPtin (TRADJENTA) 5 mg Tab tablet Take 1 tablet (5 mg total) by mouth once daily.    loratadine (CLARITIN) 10 mg tablet Take 1 tablet (10 mg total) by mouth once daily.    meclizine (ANTIVERT) 12.5 mg tablet Take 1 tablet (12.5 mg total) by mouth 3 (three) times daily as needed for Dizziness.    oxyCODONE-acetaminophen (PERCOCET)  mg per tablet Take 1 tablet by mouth 5 (five) times daily.    pantoprazole (PROTONIX) 40 MG tablet TAKE 1 TABLET(40 MG) BY MOUTH twice daily    pen needle, diabetic (SURE-FINE PEN NEEDLES) 29 gauge x 1/2" Ndle Use 4 times daily    rOPINIRole (REQUIP) 0.5 MG tablet TAKE 1 TABLET (0.5 MG TOTAL) BY MOUTH EVERY EVENING.    TRUEPLUS LANCETS 33 gauge Misc TEST BLOOD SUGAR FOUR TIMES DAILY    aspirin (ECOTRIN) 81 MG EC tablet Take 1 tablet by mouth Daily.    azelastine (ASTELIN) 137 mcg (0.1 %) nasal spray Spray 1 spray (137 mcg total) by Nasal route 2 (two) times daily.    olopatadine (PATADAY) 0.2 % Drop Place 1 drop into both eyes once daily.     No current " "facility-administered medications for this visit.      Facility-Administered Medications Ordered in Other Visits   Medication    tetracaine HCl (PF) 0.5 % Drop 1 drop       REVIEW OF SYSTEMS:    GENERAL:  No weight loss, malaise or fevers.  HEENT:  Negative for frequent or significant headaches.  NECK:  Negative for lumps, goiter, pain and significant neck swelling.  RESPIRATORY:  Negative for cough, wheezing or shortness of breath. MARLON.  CARDIOVASCULAR:  Negative for chest pain, leg swelling or palpitations.  CAD and CHF.  GI:  Negative for abdominal discomfort, blood in stools or black stools or change in bowel habits. GERD.  MUSCULOSKELETAL:  Lower back and joint pain  SKIN:  Negative for lesions, rash, and itching.  PSYCH:  Negative for sleep disturbance, mood disorder and recent psychosocial stressors.  HEMATOLOGY/LYMPHOLOGY:  Negative for prolonged bleeding, bruising easily or swollen nodes. Anemia.  NEURO:   No history of headaches, syncope, paralysis, seizures or tremors.  All other reviewed and negative other than HPI.    OBJECTIVE:    /87   Pulse 90   Temp 98 °F (36.7 °C)   Resp 18   Ht 5' 8" (1.727 m)   Wt 100 kg (220 lb 7.4 oz)   LMP  (LMP Unknown)   SpO2 100%   BMI 33.52 kg/m²     PHYSICAL EXAMINATION:    General appearance: Well appearing, in no acute distress, alert and oriented x3.  Psych:  Mood and affect appropriate.  Skin: Skin color, texture, turgor normal, no rashes or lesions, in both upper and lower body.  Head/face:  Normocephalic, atraumatic. No palpable lymph nodes.  Neck: There is significant pain to palpation over cervical paraspinals and trapezius muscles on the left. Spurling Negative bilaterally. Limited ROM with pain on flexion, extension, and lateral rotation.  Cor: RRR  Pulm: CTA  GI:  Soft and non-tender.  Back: Straight leg raise in the sitting position is negative for radicular pain bilaterally. Limited ROM with pain on extension. Positive facet loading " bilaterally.   Extremities: Peripheral joint ROM is full and pain free without obvious instability or laxity in all four extremities. No deformities, edema, or skin discoloration. Good capillary refill.  Musculoskeletal: Bilateral upper and lower extremity strength is normal and symmetric.  No atrophy or tone abnormalities are noted.  Neuro: Bilateral upper and lower extremity coordination and muscle stretch reflexes are physiologic and symmetric.  Plantar response are downgoing. + decreased sensation to C8 distribution to the right. (-) [place]   Gait: Antalgic- aided with cane.    ASSESSMENT: 80 y.o. year old female with chronic lower back pain and leg pain consistent with the following diagnoses:    1. Cervical spondylosis  betamethasone acetate-betamethasone sodium phosphate injection 6 mg   2. DDD (degenerative disc disease), cervical     3. Myofascial pain           PLAN:     - Previous imaging was reviewed and discussed with the patient today.    - Schedule for left C4,5,6 RFA.     - She is s/p bilateral L4/5 TF JANEE with benefit. We can repeat this as needed.     - Consider lumbar MBB in the future.     - I have stressed the importance of physical activity and a home exercise plan to help with pain and improve health.     - Continue medication per PCP.     - Celestone 6 mg IM in office today. Cautioned patient to monitor blood glucose.     - RTC 3 weeks after above procedure.     - Counseled patient regarding the importance of physical therapy.    - Dr. Ravi was consulted on the patient and agrees with this plan.    The above plan and management options were discussed at length with patient. Patient is in agreement with the above and verbalized understanding.     Clover Kirby  12/21/2020

## 2020-12-29 ENCOUNTER — OFFICE VISIT (OUTPATIENT)
Dept: CARDIOLOGY | Facility: CLINIC | Age: 80
End: 2020-12-29
Payer: MEDICARE

## 2020-12-29 ENCOUNTER — TELEPHONE (OUTPATIENT)
Dept: NEUROLOGY | Facility: CLINIC | Age: 80
End: 2020-12-29

## 2020-12-29 ENCOUNTER — TELEPHONE (OUTPATIENT)
Dept: HEMATOLOGY/ONCOLOGY | Facility: CLINIC | Age: 80
End: 2020-12-29

## 2020-12-29 VITALS
DIASTOLIC BLOOD PRESSURE: 72 MMHG | HEART RATE: 74 BPM | SYSTOLIC BLOOD PRESSURE: 128 MMHG | HEIGHT: 68 IN | BODY MASS INDEX: 33.31 KG/M2 | WEIGHT: 219.81 LBS

## 2020-12-29 DIAGNOSIS — E11.41 DIABETIC MONONEUROPATHY ASSOCIATED WITH TYPE 2 DIABETES MELLITUS: ICD-10-CM

## 2020-12-29 DIAGNOSIS — I82.433 ACUTE DEEP VEIN THROMBOSIS (DVT) OF POPLITEAL VEIN OF BOTH LOWER EXTREMITIES: Primary | ICD-10-CM

## 2020-12-29 DIAGNOSIS — S40.022A HEMATOMA OF ARM, LEFT, INITIAL ENCOUNTER: ICD-10-CM

## 2020-12-29 DIAGNOSIS — I50.30 DIASTOLIC CONGESTIVE HEART FAILURE, UNSPECIFIED HF CHRONICITY: ICD-10-CM

## 2020-12-29 DIAGNOSIS — Z79.4 TYPE 2 DIABETES MELLITUS WITH STAGE 3A CHRONIC KIDNEY DISEASE, WITH LONG-TERM CURRENT USE OF INSULIN: ICD-10-CM

## 2020-12-29 DIAGNOSIS — E78.2 MIXED HYPERLIPIDEMIA: ICD-10-CM

## 2020-12-29 DIAGNOSIS — E11.22 TYPE 2 DIABETES MELLITUS WITH STAGE 3A CHRONIC KIDNEY DISEASE, WITH LONG-TERM CURRENT USE OF INSULIN: ICD-10-CM

## 2020-12-29 DIAGNOSIS — I70.0 AORTIC ATHEROSCLEROSIS: ICD-10-CM

## 2020-12-29 DIAGNOSIS — G47.33 OSA ON CPAP: Chronic | ICD-10-CM

## 2020-12-29 DIAGNOSIS — I10 ESSENTIAL HYPERTENSION: Chronic | ICD-10-CM

## 2020-12-29 DIAGNOSIS — M48.062 SPINAL STENOSIS OF LUMBAR REGION WITH NEUROGENIC CLAUDICATION: ICD-10-CM

## 2020-12-29 DIAGNOSIS — E78.00 HYPERCHOLESTEROLEMIA: ICD-10-CM

## 2020-12-29 DIAGNOSIS — E66.9 OBESITY (BMI 30.0-34.9): ICD-10-CM

## 2020-12-29 DIAGNOSIS — I87.2 VENOUS INSUFFICIENCY OF BOTH LOWER EXTREMITIES: ICD-10-CM

## 2020-12-29 DIAGNOSIS — H81.13 BENIGN PAROXYSMAL POSITIONAL VERTIGO DUE TO BILATERAL VESTIBULAR DISORDER: ICD-10-CM

## 2020-12-29 DIAGNOSIS — N18.31 TYPE 2 DIABETES MELLITUS WITH STAGE 3A CHRONIC KIDNEY DISEASE, WITH LONG-TERM CURRENT USE OF INSULIN: ICD-10-CM

## 2020-12-29 DIAGNOSIS — I25.119 CORONARY ARTERY DISEASE INVOLVING NATIVE CORONARY ARTERY OF NATIVE HEART WITH ANGINA PECTORIS: ICD-10-CM

## 2020-12-29 DIAGNOSIS — E21.3 HYPERPARATHYROIDISM: ICD-10-CM

## 2020-12-29 DIAGNOSIS — M79.89 SWELLING OF FOREARM: ICD-10-CM

## 2020-12-29 DIAGNOSIS — K21.9 GASTROESOPHAGEAL REFLUX DISEASE WITHOUT ESOPHAGITIS: ICD-10-CM

## 2020-12-29 PROCEDURE — 3078F DIAST BP <80 MM HG: CPT | Mod: HCNC,CPTII,S$GLB, | Performed by: INTERNAL MEDICINE

## 2020-12-29 PROCEDURE — 1159F MED LIST DOCD IN RCRD: CPT | Mod: HCNC,S$GLB,, | Performed by: INTERNAL MEDICINE

## 2020-12-29 PROCEDURE — 99215 OFFICE O/P EST HI 40 MIN: CPT | Mod: HCNC,S$GLB,, | Performed by: INTERNAL MEDICINE

## 2020-12-29 PROCEDURE — 3074F SYST BP LT 130 MM HG: CPT | Mod: HCNC,CPTII,S$GLB, | Performed by: INTERNAL MEDICINE

## 2020-12-29 PROCEDURE — 1125F PR PAIN SEVERITY QUANTIFIED, PAIN PRESENT: ICD-10-PCS | Mod: HCNC,S$GLB,, | Performed by: INTERNAL MEDICINE

## 2020-12-29 PROCEDURE — 3072F LOW RISK FOR RETINOPATHY: CPT | Mod: HCNC,S$GLB,, | Performed by: INTERNAL MEDICINE

## 2020-12-29 PROCEDURE — 3288F FALL RISK ASSESSMENT DOCD: CPT | Mod: HCNC,CPTII,S$GLB, | Performed by: INTERNAL MEDICINE

## 2020-12-29 PROCEDURE — 99999 PR PBB SHADOW E&M-EST. PATIENT-LVL V: CPT | Mod: PBBFAC,HCNC,, | Performed by: INTERNAL MEDICINE

## 2020-12-29 PROCEDURE — 3288F PR FALLS RISK ASSESSMENT DOCUMENTED: ICD-10-PCS | Mod: HCNC,CPTII,S$GLB, | Performed by: INTERNAL MEDICINE

## 2020-12-29 PROCEDURE — 1159F PR MEDICATION LIST DOCUMENTED IN MEDICAL RECORD: ICD-10-PCS | Mod: HCNC,S$GLB,, | Performed by: INTERNAL MEDICINE

## 2020-12-29 PROCEDURE — 99215 PR OFFICE/OUTPT VISIT, EST, LEVL V, 40-54 MIN: ICD-10-PCS | Mod: HCNC,S$GLB,, | Performed by: INTERNAL MEDICINE

## 2020-12-29 PROCEDURE — 1101F PR PT FALLS ASSESS DOC 0-1 FALLS W/OUT INJ PAST YR: ICD-10-PCS | Mod: HCNC,CPTII,S$GLB, | Performed by: INTERNAL MEDICINE

## 2020-12-29 PROCEDURE — 99999 PR PBB SHADOW E&M-EST. PATIENT-LVL V: ICD-10-PCS | Mod: PBBFAC,HCNC,, | Performed by: INTERNAL MEDICINE

## 2020-12-29 PROCEDURE — 3074F PR MOST RECENT SYSTOLIC BLOOD PRESSURE < 130 MM HG: ICD-10-PCS | Mod: HCNC,CPTII,S$GLB, | Performed by: INTERNAL MEDICINE

## 2020-12-29 PROCEDURE — 1101F PT FALLS ASSESS-DOCD LE1/YR: CPT | Mod: HCNC,CPTII,S$GLB, | Performed by: INTERNAL MEDICINE

## 2020-12-29 PROCEDURE — 3072F PR LOW RISK FOR RETINOPATHY: ICD-10-PCS | Mod: HCNC,S$GLB,, | Performed by: INTERNAL MEDICINE

## 2020-12-29 PROCEDURE — 1125F AMNT PAIN NOTED PAIN PRSNT: CPT | Mod: HCNC,S$GLB,, | Performed by: INTERNAL MEDICINE

## 2020-12-29 PROCEDURE — 3078F PR MOST RECENT DIASTOLIC BLOOD PRESSURE < 80 MM HG: ICD-10-PCS | Mod: HCNC,CPTII,S$GLB, | Performed by: INTERNAL MEDICINE

## 2020-12-29 NOTE — PATIENT INSTRUCTIONS
Mrs. Henrietta Villasenor is a 80-year-old female with non obstructive CAD, IDDM2, hypertension, morbid obesity, peripheral neuropathy, osteoarthritis s/p LTKA, who presents for a follow up appointment.    1. Acute BLE popliteal DVT - Likely unprovoked.  BLE Venous US (12/15/20) revealed Bilateral popliteal vein DVT. Minimal reflux in right GSV in below-knee segment. Refer to hematology for hypercoaguable work up.  Continue anticoagulation with apixaban 5 mg twice a day.     2. BLE pain -  Refer to neurology.     3. Forearm swelling - MRI forearm.     4. BLE edema -Likely due to venous insufficiency. Check BLE venous reflux study.  Pt to elevate legs when resting.  Restrict sodium intake up to 2 g a day and fluid intake to 1.5 L a day. Start graduated compression hose.     5. PAD- .  Continue asa 81 mg and start Atorvastatin 40 mg for medical management of peripheral artery disease.    4. Obesity  - BMI today 33.72 Kg/M2. Encourage dietary modification and moderate/aerobic exercise for a minimum of 30 minutes for 5 days a week.     Follow up in 1 month.

## 2020-12-29 NOTE — PROGRESS NOTES
[facility]         Chief Complaint   Patient presents with    BLE pain        Subjective   Patient ID: Mrs. Henrietta Villasenor is a 80-year-old female with non obstructive CAD, IDDM2, hypertension, morbid obesity, peripheral neuropathy, osteoarthritis s/p LTKA, who presents for a follow up appointment.    -Patient  kindly referred to Vascular medicine clinic by Andreina Aguiar PA-C for evaluation of lower extremity pain.     - At initial clinic visit on 12/01/20, Mrs. Villasenor reported progressive worsening of BLE pain and swelling in her legs for past 2 months. She reports no trauma to legs.  She reports both an aching in her thigh, knee and shin as well as sharp, stabbing intermittent pain radiating from her left lower back down her posterior leg.  She tkes Tylenol at home which helps alleviate the her pain slightly.  She reports associated leg swelling. Patient reports no claudication, CLI, tissue loss, pigmentation changes. She has no history of DVT/PE, no recent surgeries. she was seen in pain management clinic for epidural steroid injection in early October. Patient was seen in the ED on 11/23/20 due to the leg pain. X-rays of hip and knees (11/23/20) revealed no acute malalignment, loosening or displaced fracture-dislocation. BLE Venous Ultrasound (11/23/20) revealed no evidence of DVT, incidental finding of atherosclerosis of popliteal artery was noted.  BALA revealed (11/23/20) BALA of 0.88 on the right indicating mild peripheral artery disease.  Left lower extremity BALA is within normal limits at 0.99.  No hemodynamically significant stenosis demonstrated in the left lower extremity arterial system. Patient was discharged from ED.   Plan  BLE pain (Left > Right) - Pain is likely neuropathic in origin.  Pt has mild PAD, and symptoms are not classic for claudication.  LLE arterial ultrasound on 11/23/2020 revealed BALA of 0.88 on the right indicating mild peripheral artery disease.  Left lower extremity  BALA is within normal limits at 0.99.  No hemodynamically significant stenosis demonstrated in the left lower extremity arterial system.   Refer to neurology for evaluation of neuropathic pain.   BLE edema -Likely due to venous insufficiency. Check BLE venous reflux study.  Pt to elevate legs when resting.  Restrict sodium intake up tp 2 g a day and fluid intake to 1.5 L a day.  PAD- LLE arterial ultrasound on 11/23/2020 revealed BALA of 0.88 on the right indicating mild peripheral artery disease.  Left lower extremity BALA is within normal limits at 0.99.  No hemodynamically significant stenosis demonstrated in the left lower extremity arterial system.  Continue asa 81 mg and start Atorvastatin 40 mg for medical management of peripheral artery disease.  Obesity  - BMI today 33.72 Kg/M2. Encourage dietary modification and moderate/aerobic exercise for a minimum of 30 minutes for 5 days a week.     Interval History (12/15/20)  Mrs. Villasenor presents today for vascular study - venous ultrasound of bilateral lower extremities at [facility].   Called by vascular technologist to evaluate the US image findings.   Dr. Mon and I examined and evaluated the patient in the vascular lab today at 1400 on 12/15/20.   She was noted to have acute DVT of bilateral popliteal veins bilaterally.  Patient continues to report pain in bilateral lower extremities (as on her clinic visit on 12/01/20).  Also, reports mild intermittent dyspnea with exertion.  Reports no chest pain, lightheadness or palpitations.    Physical exam significant for TTP bilateral lower extremities, cardiopulmonary exam was unremarkable.   Labs reviewed. Creatine 1.0 baseline.  Assessment: Acute popliteal vein DVT bilateral lower extremities. Given patient reports mild intermittent dyspnea, will evaluate for pulmonary embolism.   Plan: Check CTA chest with contrast to evaluate Pulmonary embolism.  Start apixaban 10 mg BID x 7 days, and 5 mg BID  thereafter.  Update: Chest CTA negative for pulmonary embolism.     HPI  Mrs. Villasenor reports pain in the neck radiating to the left arm and pain in the left hip radiating to the leg.   Patient also not a lump on the left arm with bruising, close the elbow, circular and tender to palpation.        Past Medical History:   Diagnosis Date    Abnormality of lung 11/08/2011    Stable bandlike opacities at the lung bases, most likely representing      Anxiety     Arthritis     CAD (coronary artery disease)     Calculus of ureter 2/22/2011    CHF (congestive heart failure)     Chronic back pain 7/29/2012    Colon polyp 9/2010; 11/2010    Colon polyps 7/29/2012    Coronary artery disease involving native coronary artery of native heart with angina pectoris     Depression     Diabetes mellitus     Diabetes mellitus type II     Diverticulitis large intestine 7/27/2015    Diverticulitis of large intestine without perforation or abscess without bleeding     Diverticulosis 09/25/2010; 11/02/2010; 11/08/2011; 7/29/2012    Duodenal disorder 08/25/2011    Duodenal erosion noted on EGD.    Duodenal ulcer, unspecified as acute or chronic, without hemorrhage, perforation, or obstruction 8/24/2011    E. coli sepsis 12/2010    Due to left ureteral stone with left nephrostomy tube - hospitalized in [place]    Esophageal dysmotility 01/24/2012    Noted on upper GI-barium swallow.    Facial weakness 1969    Left facial weakness s/p left mastoidectomy in ~ 1969.    Fatty liver 11/08/2011    Reported on CT-abdomen and in 06/2012 Gastro clinic visit note.    Gastric polyp 09/29/2010    GERD (gastroesophageal reflux disease) 7/29/2012    Hepatomegaly 11/08/2011    Reported on CT-abdomen    Herpes zoster with other nervous system complications(053.19) 2/28/2011    Hiatal hernia 06/26/2006; 09/29/2010; 08/25/2011    Noted on barium swallow 2006; noted on  EGD 2011.   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    Trouble in sleeping     Type II or unspecified type diabetes mellitus with neurological manifestations, not stated as uncontrolled(250.60)     Type II or unspecified type diabetes mellitus with peripheral circulatory disorders, not stated as uncontrolled(250.70)     Type II or unspecified type diabetes mellitus with renal manifestations, not stated as uncontrolled(250.40)        Past Surgical History:   Procedure Laterality Date    BELPHAROPTOSIS REPAIR      s/p LAMBERTO levator repair - Dr. Dejesus    CARDIAC CATHETERIZATION      CARPAL TUNNEL RELEASE  3/13/2012    CATARACT EXTRACTION W/  INTRAOCULAR LENS IMPLANT Right 10/31/2018        CATARACT EXTRACTION W/  INTRAOCULAR LENS IMPLANT Left 03/22/2018        CHOLECYSTECTOMY      COLONOSCOPY N/A 11/16/2016    Procedure: COLONOSCOPY;  Surgeon: Chris Storey MD;  Location: [address]);  Service: Endoscopy;  Laterality: N/A;    COLONOSCOPY N/A 6/14/2017    Procedure: COLONOSCOPY;  Surgeon: Chris Storey MD;  Location: [address]);  Service: Endoscopy;  Laterality: N/A;  colonoscopy in 3 months with better bowel prep. - Split PEG prep ordered    EXTRACORPOREAL SHOCK WAVE LITHOTRIPSY      INJECTION OF ANESTHETIC AGENT AROUND  NERVE Bilateral 6/24/2020    Procedure: BLOCK, NERVE, C4-C5-C6 MEDIAL BRANCH ok per Talia to add on;  Surgeon: Oscar Ravi MD;  Location: [facility];  Service: Pain Management;  Laterality: Bilateral;    INTRAOCULAR PROSTHESES INSERTION Right 10/31/2018    Procedure: INSERTION-INTRAOCULAR LENS (IOL);  Surgeon: Keysha Valle MD;  Location: [facility] OR;  Service: Ophthalmology;  Laterality: Right;    JOINT REPLACEMENT      mastoid tumor removal  1969    Left mastoidectomy with residual left facial weakness.    NEPHROSTOMY      OOPHORECTOMY      PHACOEMULSIFICATION OF CATARACT Right 10/31/2018    Procedure: PHACOEMULSIFICATION-ASPIRATION-CATARACT;  Surgeon: Keysha Valle MD;  Location: [facility] OR;  Service: Ophthalmology;  Laterality: Right;    TOTAL ABDOMINAL HYSTERECTOMY  1969    TAHBSO    TOTAL KNEE ARTHROPLASTY  9/13/2012    Left    TRANSFORAMINAL EPIDURAL INJECTION OF STEROID Bilateral 9/17/2019    Procedure: INJECTION, STEROID, EPIDURAL, TRANSFORAMINAL APPROACH;  Surgeon: Oscar Ravi MD;  Location: [facility];  Service: Pain Management;  Laterality: Bilateral;  B/L TFESI L4/L5    TRANSFORAMINAL EPIDURAL INJECTION OF STEROID Bilateral 10/2/2020    Procedure: INJECTION, STEROID, EPIDURAL, TRANSFORAMINAL APPROACH;  Surgeon: Oscar Ravi MD;  Location: [facility];  Service: Pain Management;  Laterality: Bilateral;  B/L TFESI L4/5       Family History   Problem Relation Age of Onset    Sleep apnea Sister     Diabetes Sister     Cancer Mother         brain tumor    Hypertension Mother     Heart disease Father     Heart attack Father     Dementia Brother     Lupus Brother     Frontotemporal dementia Brother     No Known Problems Daughter     No Known Problems Son     Diabetes Sister     Lupus Sister     Breast cancer Sister     Diabetes Sister     Cancer Sister         breast cancer    Heart attack Sister     Diabetes Sister     Liver cancer Brother     Drug abuse  Brother     Alcohol abuse Brother     Cirrhosis Brother     Drug abuse Brother     No Known Problems Daughter     No Known Problems Son     No Known Problems Son     No Known Problems Maternal Grandmother     No Known Problems Maternal Grandfather     No Known Problems Paternal Grandmother     No Known Problems Paternal Grandfather     Diabetes Brother     Dementia Brother     Diabetes Other     Ovarian cancer Other         Niece     Breast cancer Maternal Aunt     No Known Problems Maternal Uncle     No Known Problems Paternal Aunt     No Known Problems Paternal Uncle     Glaucoma Neg Hx     Blindness Neg Hx     Celiac disease Neg Hx     Colon cancer Neg Hx     Colon polyps Neg Hx     Esophageal cancer Neg Hx     Inflammatory bowel disease Neg Hx     Liver disease Neg Hx     Rectal cancer Neg Hx     Stomach cancer Neg Hx     Ulcerative colitis Neg Hx     Melanoma Neg Hx     Multiple sclerosis Neg Hx     Psoriasis Neg Hx     Amblyopia Neg Hx     Cataracts Neg Hx     Macular degeneration Neg Hx     Retinal detachment Neg Hx     Strabismus Neg Hx     Stroke Neg Hx     Thyroid disease Neg Hx        Social History     Tobacco Use    Smoking status: Former Smoker     Packs/day: 1.00     Years: 15.00     Pack years: 15.00     Types: Cigarettes     Quit date: 2000     Years since quittin.4    Smokeless tobacco: Never Used   Substance Use Topics    Alcohol use: No       Review of patient's allergies indicates:   Allergen Reactions    Crestor [rosuvastatin]      Cramping in legs    Ezetimibe Diarrhea     Other reaction(s): abdominal pain, Diarrhea      Hydrocodone Itching    Lisinopril      Other reaction(s): cough      Sulfa (sulfonamide antibiotics) Itching and Rash           Sulfamethoxazole     Sulfamethoxazole-trimethoprim     Valsartan Swelling       Current Outpatient Medications on File Prior to Visit   Medication Sig Dispense Refill    ACCU-CHEK GUIDE TEST  "STRIPS Strp USE TO CHECK BLOOD SUGAR THREE TIMES DAILY 150 strip 4    ACCU-CHEK SMARTVIEW TEST STRIP Strp TEST THREE TIMES DAILY 300 strip 3    albuterol (VENTOLIN HFA) 90 mcg/actuation inhaler Inhale 2 puffs by mouth into the lungs every 4 (four) hours as needed for Wheezing. Rescue 18 g 1    allopurinoL (ZYLOPRIM) 100 MG tablet Take 1 tablet (100 mg total) by mouth once daily. 90 tablet 3    amLODIPine (NORVASC) 5 MG tablet Take 1 tablet (5 mg total) by mouth once daily. 90 tablet 3    aspirin (ECOTRIN) 81 MG EC tablet Take 1 tablet by mouth Daily.      atorvastatin (LIPITOR) 40 MG tablet TAKE 1 TABLET EVERY DAY 90 tablet 3    azelastine (ASTELIN) 137 mcg (0.1 %) nasal spray Spray 1 spray (137 mcg total) by Nasal route 2 (two) times daily. 30 mL 3    BD ALCOHOL SWABS PadM USE  WHEN  TESTING BLOOD SUGAR FOUR TIMES DAILY 400 each 3    BD ULTRA-FINE OLU PEN NEEDLES 32 x 5/32 " Ndle Uses 4 times daily, on multiple daily insulin injections 130 each 12    blood-glucose meter kit Use as instructed 1 each 0    carbamide peroxide (EAR WAX REMOVAL DROPS OTIC)       carvediloL (COREG) 25 MG tablet Take 1 tablet (25 mg total) by mouth 2 (two) times daily. 180 tablet 3    cholecalciferol, vitamin D3, (DIALYVITE VITAMIN D) 5,000 unit capsule Take 5,000 Units by mouth once daily.      DULoxetine (CYMBALTA) 60 MG capsule Take 1 capsule (60 mg total) by mouth 2 (two) times daily. 180 capsule 3    fluticasone (FLONASE) 50 mcg/actuation nasal spray SHAKE LQ AND U 2 SPRAYS IEN QD 1 Bottle 6    furosemide (LASIX) 40 MG tablet TAKE 1 TABLET BY MOUTH TWICE DAILY 180 tablet 4    insulin (LANTUS SOLOSTAR U-100 INSULIN) glargine 100 units/mL (3mL) SubQ pen Inject 30 Units into the skin 2 (two) times a day. 60 mL 1    insulin aspart U-100 (NOVOLOG) 100 unit/mL (3 mL) InPn pen Inject 24 Units into the skin before breakfast AND 28 Units daily with lunch AND 28 Units before dinner. 120 mL 3    lancets (ACCU-CHEK FASTCLIX " "LANCING DEV) Misc Lancets and device = check blood sugar 4 times daily 400 each 4    linaGLIPtin (TRADJENTA) 5 mg Tab tablet Take 1 tablet (5 mg total) by mouth once daily. 90 tablet 3    loratadine (CLARITIN) 10 mg tablet Take 1 tablet (10 mg total) by mouth once daily. 30 tablet 4    meclizine (ANTIVERT) 12.5 mg tablet Take 1 tablet (12.5 mg total) by mouth 3 (three) times daily as needed for Dizziness. 30 tablet 0    olopatadine (PATADAY) 0.2 % Drop Place 1 drop into both eyes once daily. 2.5 mL 12    [START ON 12/10/2020] oxyCODONE-acetaminophen (PERCOCET)  mg per tablet Take 1 tablet by mouth 5 (five) times daily. 150 tablet 0    pantoprazole (PROTONIX) 40 MG tablet TAKE 1 TABLET(40 MG) BY MOUTH twice daily 180 tablet 4    pen needle, diabetic (SURE-FINE PEN NEEDLES) 29 gauge x 1/2" Ndle Use 4 times daily 400 each 4    rOPINIRole (REQUIP) 0.5 MG tablet TAKE 1 TABLET (0.5 MG TOTAL) BY MOUTH EVERY EVENING. 90 tablet 1    TRUEPLUS LANCETS 33 gauge Misc TEST BLOOD SUGAR FOUR TIMES DAILY 400 each 4     Review of Systems   Constitution: Negative for chills, fever and malaise/fatigue.   HENT: Negative for congestion and hoarse voice.    Eyes: Negative for visual disturbance.   Cardiovascular: Positive for left knee swelling. Negative for dyspnea on exertion, orthopnea and paroxysmal nocturnal dyspnea.   Respiratory: Negative for cough, shortness of breath and sputum production.    Endocrine: Negative for cold intolerance, heat intolerance, polydipsia and polyuria.   Hematologic/Lymphatic: Negative for adenopathy and bleeding problem. Does not bruise/bleed easily.   Skin: Negative for color change, flushing and itching.   Musculoskeletal: Positive for leg pain and tenderness to palpation.  Left arm swelling near elbow. Negative for back pain, joint pain, joint swelling and muscle cramps.   Gastrointestinal: Negative for abdominal pain, constipation, diarrhea and nausea.   Genitourinary: Negative for " "pelvic pain.   Neurological: Negative for focal weakness, light-headedness, loss of balance, paresthesias, seizures and weakness    Objective   /72   Pulse 74   Ht 5' 8" (1.727 m)   Wt 99.7 kg (219 lb 12.8 oz)   LMP  (LMP Unknown)   BMI 33.42 kg/m²     Physical Exam   Constitutional: No acute distress, conversant  HEENT: Sclera anicteric, Pupils equal, round and reactive to light, extraocular motions intact, Oropharynx clear  Neck: No JVD, no carotid bruits  Cardiovascular: regular rate and rhythm, no murmur, rubs or gallops, normal S1/S2  Pulmonary: Clear to auscultation bilaterally  Abdominal: Abdomen soft, nontender, nondistended, positive bowel sounds  Extremities: BLE's with prominent varicose veins and TTP. No significant edema. Left lateral knee pain tender to palpation, no knee effusion, or warmth.   Left forearm round swelling, with bruising 3 cm x 3 cm, no TTP  Pulses:  Carotid pulses are 2+ on the right side, and 2+ on the left side.  Radial pulses are 2+ on the right side, and 2+ on the left side.   Femoral pulses are 2+ on the right side, and 2+ on the left side.  Popliteal pulses are 2+ on the right side, and 2+ on the left side.   Dorsalis pedis pulses are 2+ on the right side, and 2+ on the left side.   Posterior tibial pulses are 2+ on the right side, and 2+ on the left side.    Skin: No ecchymosis, erythema, or ulcers  Psych: Alert and oriented x 3, appropriate affect  Neuro: CNII-XII intact, no focal deficits    CMP  Sodium   Date Value Ref Range Status   10/22/2020 143 136 - 145 mmol/L Final     Potassium   Date Value Ref Range Status   10/22/2020 3.5 3.5 - 5.1 mmol/L Final     Chloride   Date Value Ref Range Status   10/22/2020 99 95 - 110 mmol/L Final     CO2   Date Value Ref Range Status   10/22/2020 30 (H) 23 - 29 mmol/L Final     Glucose   Date Value Ref Range Status   10/22/2020 125 (H) 70 - 110 mg/dL Final     BUN   Date Value Ref Range Status   10/22/2020 11 8 - 23 mg/dL Final "     Creatinine   Date Value Ref Range Status   10/22/2020 1.3 0.5 - 1.4 mg/dL Final     Calcium   Date Value Ref Range Status   10/22/2020 9.6 8.7 - 10.5 mg/dL Final     Total Protein   Date Value Ref Range Status   10/22/2020 7.9 6.0 - 8.4 g/dL Final     Albumin   Date Value Ref Range Status   10/22/2020 3.4 (L) 3.5 - 5.2 g/dL Final     Total Bilirubin   Date Value Ref Range Status   10/22/2020 0.5 0.1 - 1.0 mg/dL Final     Comment:     For infants and newborns, interpretation of results should be based  on gestational age, weight and in agreement with clinical  observations.  Premature Infant recommended reference ranges:  Up to 24 hours.............<8.0 mg/dL  Up to 48 hours............<12.0 mg/dL  3-5 days..................<15.0 mg/dL  6-29 days.................<15.0 mg/dL       Alkaline Phosphatase   Date Value Ref Range Status   10/22/2020 96 55 - 135 U/L Final     AST   Date Value Ref Range Status   10/22/2020 20 10 - 40 U/L Final     ALT   Date Value Ref Range Status   10/22/2020 13 10 - 44 U/L Final     Anion Gap   Date Value Ref Range Status   10/22/2020 14 8 - 16 mmol/L Final     eGFR if    Date Value Ref Range Status   10/22/2020 44.8 (A) >60 mL/min/1.73 m^2 Final     eGFR if non    Date Value Ref Range Status   10/22/2020 38.8 (A) >60 mL/min/1.73 m^2 Final     Comment:     Calculation used to obtain the estimated glomerular filtration  rate (eGFR) is the CKD-EPI equation.          Lab Results   Component Value Date    WBC 11.96 06/19/2020    HGB 12.1 06/19/2020    HCT 39 11/23/2020    MCV 92 06/19/2020     06/19/2020       Imaging/Diagnostics    BLE Venous US (12/15/20)  Bilateral popliteal vein DVT.  Minimal reflux in right GSV in below-knee segment    XR Hip (11/23/20)  FINDINGS:  Femoral heads appear normally positioned.  No evidence of acute fracture, subluxation or dislocation.  Mild-moderate joint space narrowing of the hips bilaterally.  Mild degenerative  osteophytic spurring of the acetabulum bilaterally   SI joints appear intact.  Pubic rami are intact.  Vascular atherosclerosis.  Impression:  No acute radiographic abnormality.     LLE arterial ultrasound (11/23/20)  Impression:  BALA of 0.88 on the right indicating mild peripheral artery disease.  Left lower extremity BALA is within normal limits at 0.99.   No hemodynamically significant stenosis demonstrated in the left lower extremity arterial system.    XR Knees (11/23/20)  Impression:  Remote left TKA without evidence of acute malalignment, loosening or displaced fracture-dislocation.    Assessment and Plan  Mrs. Henrietta Villasenor is a 80-year-old female with non obstructive CAD, IDDM2, hypertension, morbid obesity, peripheral neuropathy, osteoarthritis s/p LTKA, who presents for a follow up appointment.    1. Acute BLE popliteal DVT - Likely unprovoked.  BLE Venous US (12/15/20) revealed Bilateral popliteal vein DVT. Minimal reflux in right GSV in below-knee segment. Refer to hematology for hypercoaguable work up.  Continue anticoagulation with apixaban 5 mg twice a day.     2. BLE pain (Left > Right) - Pain is likely neuropathic in origin.  Patient has chronic leg and arm pain due to DJD of cervical and Lumbar spine.  Pt has mild PAD, and symptoms are not classic for claudication.  LLE arterial ultrasound on 11/23/2020 revealed BALA of 0.88 on the right indicating mild peripheral artery disease.  Left lower extremity BALA is within normal limits at 0.99.  No hemodynamically significant stenosis demonstrated in the left lower extremity arterial system.  Patient is following with pain clinic.  Refer to neurology for evaluation of neuropathic pain.     3. Forearm swelling - Likely due to hematoma in setting of recent fall.  Check MRI forearm with contrast to evaluate for soft tissue swelling.     4. BLE edema -Likely due to venous insufficiency and post thrombotic syndrome. Significant TTP on palpation, no evidence of  Phlegmasia cerulea dolens. Pt to elevate legs when resting.  Restrict sodium intake up tp 2 g a day and fluid intake to 1.5 L a day.     5. PAD- LLE arterial ultrasound on 11/23/2020 revealed BALA of 0.88 on the right indicating mild peripheral artery disease.  Left lower extremity BALA is within normal limits at 0.99.  No hemodynamically significant stenosis demonstrated in the left lower extremity arterial system.  Continue asa 81 mg and start Atorvastatin 40 mg for medical management of peripheral artery disease.    6. Obesity  - BMI today 33.42 Kg/M2. Encourage dietary modification and moderate/aerobic exercise for a minimum of 30 minutes for 5 days a week.     Follow up in 1 month.     Total duration of face to face visit time 30 minutes.  Total time spent counseling greater than fifty percent of total visit time.  Counseling included discussion regarding imaging findings, diagnosis, possibilities, treatment options, risks and benefits.  The patient had many questions regarding the options and long-term effects.    Patient seen and discussed with Dr. Reilly. Further recommendations per the attending addendum.        Jeffrey Warner MD  Vascular Medicine Fellow PGY IV  Department of Vascular Medicine  [facility]

## 2020-12-30 ENCOUNTER — PES CALL (OUTPATIENT)
Dept: ADMINISTRATIVE | Facility: CLINIC | Age: 80
End: 2020-12-30

## 2020-12-30 ENCOUNTER — TELEPHONE (OUTPATIENT)
Dept: HEMATOLOGY/ONCOLOGY | Facility: CLINIC | Age: 80
End: 2020-12-30

## 2020-12-31 ENCOUNTER — TELEPHONE (OUTPATIENT)
Dept: HEMATOLOGY/ONCOLOGY | Facility: CLINIC | Age: 80
End: 2020-12-31

## 2021-01-04 ENCOUNTER — OUTPATIENT CASE MANAGEMENT (OUTPATIENT)
Dept: ADMINISTRATIVE | Facility: OTHER | Age: 81
End: 2021-01-04

## 2021-01-05 ENCOUNTER — TELEPHONE (OUTPATIENT)
Dept: CARDIOLOGY | Facility: CLINIC | Age: 81
End: 2021-01-05

## 2021-01-06 ENCOUNTER — HOSPITAL ENCOUNTER (OUTPATIENT)
Dept: RADIOLOGY | Facility: HOSPITAL | Age: 81
Discharge: HOME | End: 2021-01-06
Attending: STUDENT IN AN ORGANIZED HEALTH CARE EDUCATION/TRAINING PROGRAM
Payer: MEDICARE

## 2021-01-06 DIAGNOSIS — M79.89 SWELLING OF FOREARM: ICD-10-CM

## 2021-01-06 PROCEDURE — 73221 MRI JOINT UPR EXTREM W/O DYE: CPT | Mod: TC,HCNC,LT

## 2021-01-06 PROCEDURE — 73220 MRI FOREARM W WO CONTRAST LEFT: ICD-10-PCS | Mod: 26,HCNC,LT, | Performed by: RADIOLOGY

## 2021-01-06 PROCEDURE — 25500020 PHARM REV CODE 255: Mod: HCNC | Performed by: STUDENT IN AN ORGANIZED HEALTH CARE EDUCATION/TRAINING PROGRAM

## 2021-01-06 PROCEDURE — 73220 MRI UPPR EXTREMITY W/O&W/DYE: CPT | Mod: 26,HCNC,LT, | Performed by: RADIOLOGY

## 2021-01-06 PROCEDURE — A9585 GADOBUTROL INJECTION: HCPCS | Mod: HCNC | Performed by: STUDENT IN AN ORGANIZED HEALTH CARE EDUCATION/TRAINING PROGRAM

## 2021-01-06 RX ORDER — GADOBUTROL 604.72 MG/ML
10 INJECTION INTRAVENOUS
Status: COMPLETED | OUTPATIENT
Start: 2021-01-06 | End: 2021-01-06

## 2021-01-06 RX ADMIN — GADOBUTROL 10 ML: 604.72 INJECTION INTRAVENOUS at 02:01

## 2021-01-10 NOTE — PROGRESS NOTES
01/10/21 1200   CM/SW Screening   Referral Source Physician   Information Source   (Patient)   Patient's Mental Status Memory Impairments   Patient's Home Environment   ( [facility] for 10 years)   Patient Status Prior to Admission   Independent Subjective:      Patient ID: Henrietta Villasenor is a 78 y.o. female.    Chief Complaint: PCP (Jeannine Arriaga MD 11/19/18); Diabetic Foot Exam (numbness, tingling, skin problem ); Nail Problem; and Nail Care    Henrietta is a 78 y.o. female who presents to the clinic for evaluation and treatment of high risk feet. Henrietta has a past medical history of Abnormality of lung (11/08/2011), Anxiety, Arthritis, CAD (coronary artery disease), Calculus of ureter (2/22/2011), Cataract, CHF (congestive heart failure), Chronic back pain (7/29/2012), Colon polyp (9/2010; 11/2010), Colon polyps (7/29/2012), Coronary artery disease involving native coronary artery of native heart with angina pectoris, Depression, Diabetes mellitus, Diabetes mellitus type II, Diverticulitis large intestine (7/27/2015), Diverticulosis (09/25/2010; 11/02/2010; 11/08/2011; 7/29/2012), Duodenal disorder (08/25/2011), Duodenal ulcer, unspecified as acute or chronic, without hemorrhage, perforation, or obstruction (8/24/2011), E. 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), 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), Obesity, MARLON (obstructive sleep apnea), Pain, Peripheral neuropathy, Pneumonia, Polyneuropathy, Postherpetic neuralgia, Recurrent nephrolithiasis, S/P knee replacement (10/2/2012), S/P TKR (total knee replacement) (12/26/2012), Sensorineural hearing loss of both ears, Thyroid disease (11/08/2011),  Trouble in sleeping, Type II or unspecified type diabetes mellitus with neurological manifestations, not stated as uncontrolled(250.60), Type II or unspecified type diabetes mellitus with peripheral circulatory disorders, not stated as uncontrolled(250.70), and Type II or unspecified type diabetes mellitus with renal manifestations, not stated as uncontrolled(250.40). The patient's chief complaint is long, thick toenails. This patient has documented high risk feet requiring routine maintenance secondary to diabetes mellitis and those secondary complications of diabetes, as mentioned..       PCP: Jeannine Arriaga MD    Date Last Seen by PCP:   Chief Complaint   Patient presents with    PCP     Jeannine Arriaga MD 11/19/18    Diabetic Foot Exam     numbness, tingling, skin problem     Nail Problem    Nail Care         Current shoe gear: casual shoes    Hemoglobin A1C   Date Value Ref Range Status   10/03/2018 8.4 (H) 4.0 - 5.6 % Final     Comment:     ADA Screening Guidelines:  5.7-6.4%  Consistent with prediabetes  >or=6.5%  Consistent with diabetes  High levels of fetal hemoglobin interfere with the HbA1C  assay. Heterozygous hemoglobin variants (HbS, HgC, etc)do  not significantly interfere with this assay.   However, presence of multiple variants may affect accuracy.     04/19/2018 7.7 (H) 4.0 - 5.6 % Final     Comment:     According to ADA guidelines, hemoglobin A1c <7.0% represents  optimal control in non-pregnant diabetic patients. Different  metrics may apply to specific patient populations.   Standards of Medical Care in Diabetes-2016.  For the purpose of screening for the presence of diabetes:  <5.7%     Consistent with the absence of diabetes  5.7-6.4%  Consistent with increasing risk for diabetes   (prediabetes)  >or=6.5%  Consistent with diabetes  Currently, no consensus exists for use of hemoglobin A1c  for diagnosis of diabetes for children.  This Hemoglobin A1c assay has significant interference  to [facility]  [phone]  [email or web address] with fetal   hemoglobin   (HbF). The results are invalid for patients with abnormal amounts of   HbF,   including those with known Hereditary Persistence   of Fetal Hemoglobin. Heterozygous hemoglobin variants (HbAS, HbAC,   HbAD, HbAE, HbA2) do not significantly interfere with this assay;   however, presence of multiple variants in a sample may impact the %   interference.     12/08/2017 7.8 (H) 4.0 - 5.6 % Final     Comment:     According to ADA guidelines, hemoglobin A1c <7.0% represents  optimal control in non-pregnant diabetic patients. Different  metrics may apply to specific patient populations.   Standards of Medical Care in Diabetes-2016.  For the purpose of screening for the presence of diabetes:  <5.7%     Consistent with the absence of diabetes  5.7-6.4%  Consistent with increasing risk for diabetes   (prediabetes)  >or=6.5%  Consistent with diabetes  Currently, no consensus exists for use of hemoglobin A1c  for diagnosis of diabetes for children.  This Hemoglobin A1c assay has significant interference with fetal   hemoglobin   (HbF). The results are invalid for patients with abnormal amounts of   HbF,   including those with known Hereditary Persistence   of Fetal Hemoglobin. Heterozygous hemoglobin variants (HbAS, HbAC,   HbAD, HbAE, HbA2) do not significantly interfere with this assay;   however, presence of multiple variants in a sample may impact the %   interference.         Review of Systems   Constitution: Negative for chills, decreased appetite and fever.   Cardiovascular: Negative for leg swelling.   Skin: Positive for dry skin and nail changes. Negative for color change, flushing, itching and poor wound healing.   Musculoskeletal: Positive for arthritis. Negative for back pain, falls, joint pain, joint swelling, muscle cramps and myalgias.   Gastrointestinal: Negative for nausea and vomiting.   Neurological: Positive for numbness and paresthesias. Negative for loss of balance.          "  Objective:       Vitals:    12/17/18 0914   BP: (!) 160/81   Pulse: 72   Resp: 18   Weight: 102 kg (224 lb 13.9 oz)   Height: 5' 8" (1.727 m)   PainSc: 0-No pain        Physical Exam   Constitutional: She is oriented to person, place, and time. She appears well-developed and well-nourished.   Cardiovascular:   Dorsalis pedis and posterior tibial pulses are diminished Bilaterally. Toes are cool to touch. Feet are warm proximally.There is decreased digital hair. Skin is atrophic, slightly hyperpigmented, and mildly edematous       Musculoskeletal: Normal range of motion. She exhibits no edema or tenderness.   Adequate joint range of motion without pain, limitation, nor crepitation Bilateral feet and ankle joints. Muscle strength is 5/5 in all groups bilaterally.      semi reducible IPJ digital contracture 2nd toe b/l    Neurological: She is alert and oriented to person, place, and time.   [place] 5.07 monofilamant testing is diminished Mynor feet. Sharp/dull sensation diminished Bilaterally. Light touch absent Bilaterally.       Skin: Skin is warm, dry and intact. No abrasion, no bruising, no burn, no ecchymosis and no lesion noted. She is not diaphoretic. No erythema. No pallor.   Nails x 10  are elongated by  3-6mm's, thickened by 2-5 mm's, dystrophic, and are darkened in  coloration . Xerosis Bilaterally. No open lesions noted.    Hyperkeratotic tissue noted to distal toes 1-4 b/l        Psychiatric: She has a normal mood and affect. Her behavior is normal.   Nursing note and vitals reviewed.            Assessment:       Encounter Diagnoses   Name Primary?    Diabetic polyneuropathy associated with type 2 diabetes mellitus Yes    Onychomycosis due to dermatophyte     Corn or callus          Plan:       Henrietta was seen today for pcp, diabetic foot exam, nail problem and nail care.    Diagnoses and all orders for this visit:    Diabetic polyneuropathy associated with type 2 diabetes " mellitus    Onychomycosis due to dermatophyte    Corn or callus      I counseled the patient on her conditions, their implications and medical management.    Shoe inspection. Diabetic Foot Education. Patient reminded of the importance of good nutrition and blood sugar control to help prevent podiatric complications of diabetes. Patient instructed on proper foot hygeine. We discussed wearing proper shoe gear, daily foot inspections, never walking without protective shoe gear, never putting sharp instruments to feet    - With patient's permission, nails were aggressively reduced and debrided x 10 to their soft tissue attachment mechanically and with electric , removing all offending nail and debris. Patient relates relief following the procedure. She will continue to monitor the areas daily, inspect her feet, wear protective shoe gear when ambulatory, moisturizer to maintain skin integrity and follow in this office in approximately 2-3 months, sooner p.r.n.    - After cleansing the  area w/ alcohol prep pad the above mentioned hyperkeratosis was trimmed utilizing No 15 scapel, to a smooth base with out incident. Patient tolerated this  well and reported comfort to the area of distal toes 1-4 b/l   .

## 2021-01-11 RX ORDER — OXYCODONE AND ACETAMINOPHEN 10; 325 MG/1; MG/1
1 TABLET ORAL
Qty: 150 TABLET | Refills: 0 | OUTPATIENT
Start: 2021-01-11

## 2021-01-11 RX ORDER — OXYCODONE AND ACETAMINOPHEN 10; 325 MG/1; MG/1
1 TABLET ORAL
Qty: 150 TABLET | Refills: 0 | Status: SHIPPED | OUTPATIENT
Start: 2021-01-11 | End: 2021-02-10 | Stop reason: SDUPTHER

## 2021-01-12 ENCOUNTER — TELEPHONE (OUTPATIENT)
Dept: ENDOSCOPY | Facility: HOSPITAL | Age: 81
End: 2021-01-12

## 2021-01-20 ENCOUNTER — IMMUNIZATION (OUTPATIENT)
Dept: INTERNAL MEDICINE | Facility: CLINIC | Age: 81
End: 2021-01-20
Payer: MEDICARE

## 2021-01-20 DIAGNOSIS — Z23 NEED FOR VACCINATION: Primary | ICD-10-CM

## 2021-01-20 PROCEDURE — 91300 COVID-19, MRNA, LNP-S, PF, 30 MCG/0.3 ML DOSE VACCINE: CPT | Mod: PBBFAC | Performed by: INTERNAL MEDICINE

## 2021-01-26 ENCOUNTER — OUTPATIENT CASE MANAGEMENT (OUTPATIENT)
Dept: ADMINISTRATIVE | Facility: OTHER | Age: 81
End: 2021-01-26

## 2021-01-26 ENCOUNTER — OFFICE VISIT (OUTPATIENT)
Dept: NEUROLOGY | Facility: CLINIC | Age: 81
End: 2021-01-26
Payer: MEDICARE

## 2021-01-26 VITALS
DIASTOLIC BLOOD PRESSURE: 82 MMHG | HEIGHT: 68 IN | SYSTOLIC BLOOD PRESSURE: 150 MMHG | HEART RATE: 85 BPM | BODY MASS INDEX: 33.42 KG/M2

## 2021-01-26 DIAGNOSIS — M48.062 SPINAL STENOSIS OF LUMBAR REGION WITH NEUROGENIC CLAUDICATION: ICD-10-CM

## 2021-01-26 DIAGNOSIS — M79.2 NEUROPATHIC PAIN: ICD-10-CM

## 2021-01-26 PROCEDURE — 3079F DIAST BP 80-89 MM HG: CPT | Mod: CPTII,S$GLB,, | Performed by: PSYCHIATRY & NEUROLOGY

## 2021-01-26 PROCEDURE — 99999 PR PBB SHADOW E&M-EST. PATIENT-LVL V: ICD-10-PCS | Mod: PBBFAC,,, | Performed by: PSYCHIATRY & NEUROLOGY

## 2021-01-26 PROCEDURE — 1125F PR PAIN SEVERITY QUANTIFIED, PAIN PRESENT: ICD-10-PCS | Mod: S$GLB,,, | Performed by: PSYCHIATRY & NEUROLOGY

## 2021-01-26 PROCEDURE — 99204 OFFICE O/P NEW MOD 45 MIN: CPT | Mod: S$GLB,,, | Performed by: PSYCHIATRY & NEUROLOGY

## 2021-01-26 PROCEDURE — 3077F SYST BP >= 140 MM HG: CPT | Mod: CPTII,S$GLB,, | Performed by: PSYCHIATRY & NEUROLOGY

## 2021-01-26 PROCEDURE — 3072F PR LOW RISK FOR RETINOPATHY: ICD-10-PCS | Mod: S$GLB,,, | Performed by: PSYCHIATRY & NEUROLOGY

## 2021-01-26 PROCEDURE — 1125F AMNT PAIN NOTED PAIN PRSNT: CPT | Mod: S$GLB,,, | Performed by: PSYCHIATRY & NEUROLOGY

## 2021-01-26 PROCEDURE — 3288F PR FALLS RISK ASSESSMENT DOCUMENTED: ICD-10-PCS | Mod: CPTII,S$GLB,, | Performed by: PSYCHIATRY & NEUROLOGY

## 2021-01-26 PROCEDURE — 3077F PR MOST RECENT SYSTOLIC BLOOD PRESSURE >= 140 MM HG: ICD-10-PCS | Mod: CPTII,S$GLB,, | Performed by: PSYCHIATRY & NEUROLOGY

## 2021-01-26 PROCEDURE — 3079F PR MOST RECENT DIASTOLIC BLOOD PRESSURE 80-89 MM HG: ICD-10-PCS | Mod: CPTII,S$GLB,, | Performed by: PSYCHIATRY & NEUROLOGY

## 2021-01-26 PROCEDURE — 3072F LOW RISK FOR RETINOPATHY: CPT | Mod: S$GLB,,, | Performed by: PSYCHIATRY & NEUROLOGY

## 2021-01-26 PROCEDURE — 99204 PR OFFICE/OUTPT VISIT, NEW, LEVL IV, 45-59 MIN: ICD-10-PCS | Mod: S$GLB,,, | Performed by: PSYCHIATRY & NEUROLOGY

## 2021-01-26 PROCEDURE — 1159F MED LIST DOCD IN RCRD: CPT | Mod: S$GLB,,, | Performed by: PSYCHIATRY & NEUROLOGY

## 2021-01-26 PROCEDURE — 1101F PT FALLS ASSESS-DOCD LE1/YR: CPT | Mod: CPTII,S$GLB,, | Performed by: PSYCHIATRY & NEUROLOGY

## 2021-01-26 PROCEDURE — 3288F FALL RISK ASSESSMENT DOCD: CPT | Mod: CPTII,S$GLB,, | Performed by: PSYCHIATRY & NEUROLOGY

## 2021-01-26 PROCEDURE — 99999 PR PBB SHADOW E&M-EST. PATIENT-LVL V: CPT | Mod: PBBFAC,,, | Performed by: PSYCHIATRY & NEUROLOGY

## 2021-01-26 PROCEDURE — 1101F PR PT FALLS ASSESS DOC 0-1 FALLS W/OUT INJ PAST YR: ICD-10-PCS | Mod: CPTII,S$GLB,, | Performed by: PSYCHIATRY & NEUROLOGY

## 2021-01-26 PROCEDURE — 1159F PR MEDICATION LIST DOCUMENTED IN MEDICAL RECORD: ICD-10-PCS | Mod: S$GLB,,, | Performed by: PSYCHIATRY & NEUROLOGY

## 2021-02-02 ENCOUNTER — TELEPHONE (OUTPATIENT)
Dept: ADMINISTRATIVE | Facility: OTHER | Age: 81
End: 2021-02-02

## 2021-02-03 ENCOUNTER — PATIENT OUTREACH (OUTPATIENT)
Dept: ADMINISTRATIVE | Facility: OTHER | Age: 81
End: 2021-02-03

## 2021-02-04 ENCOUNTER — OFFICE VISIT (OUTPATIENT)
Dept: CARDIOLOGY | Facility: CLINIC | Age: 81
End: 2021-02-04
Payer: MEDICARE

## 2021-02-04 VITALS
WEIGHT: 218.94 LBS | DIASTOLIC BLOOD PRESSURE: 88 MMHG | SYSTOLIC BLOOD PRESSURE: 138 MMHG | OXYGEN SATURATION: 98 % | HEIGHT: 68 IN | HEART RATE: 85 BPM | BODY MASS INDEX: 33.18 KG/M2

## 2021-02-04 DIAGNOSIS — I15.2 OBESITY, DIABETES, AND HYPERTENSION SYNDROME: ICD-10-CM

## 2021-02-04 DIAGNOSIS — E11.59 OBESITY, DIABETES, AND HYPERTENSION SYNDROME: ICD-10-CM

## 2021-02-04 DIAGNOSIS — E78.2 MIXED HYPERLIPIDEMIA: ICD-10-CM

## 2021-02-04 DIAGNOSIS — I10 ESSENTIAL HYPERTENSION: Chronic | ICD-10-CM

## 2021-02-04 DIAGNOSIS — E11.69 OBESITY, DIABETES, AND HYPERTENSION SYNDROME: ICD-10-CM

## 2021-02-04 DIAGNOSIS — I82.433 ACUTE DEEP VEIN THROMBOSIS (DVT) OF POPLITEAL VEIN OF BOTH LOWER EXTREMITIES: Primary | ICD-10-CM

## 2021-02-04 DIAGNOSIS — I25.2 HISTORY OF MI (MYOCARDIAL INFARCTION): ICD-10-CM

## 2021-02-04 DIAGNOSIS — N18.31 TYPE 2 DIABETES MELLITUS WITH STAGE 3A CHRONIC KIDNEY DISEASE, WITH LONG-TERM CURRENT USE OF INSULIN: ICD-10-CM

## 2021-02-04 DIAGNOSIS — M47.816 LUMBAR SPONDYLOSIS: ICD-10-CM

## 2021-02-04 DIAGNOSIS — I77.819 ECTATIC AORTA: ICD-10-CM

## 2021-02-04 DIAGNOSIS — G58.8 OTHER MONONEUROPATHY: Chronic | ICD-10-CM

## 2021-02-04 DIAGNOSIS — M48.062 SPINAL STENOSIS OF LUMBAR REGION WITH NEUROGENIC CLAUDICATION: ICD-10-CM

## 2021-02-04 DIAGNOSIS — Z79.4 TYPE 2 DIABETES MELLITUS WITH DIABETIC POLYNEUROPATHY, WITH LONG-TERM CURRENT USE OF INSULIN: ICD-10-CM

## 2021-02-04 DIAGNOSIS — G47.33 OSA ON CPAP: Chronic | ICD-10-CM

## 2021-02-04 DIAGNOSIS — E66.9 OBESITY, DIABETES, AND HYPERTENSION SYNDROME: ICD-10-CM

## 2021-02-04 DIAGNOSIS — I70.0 AORTIC ATHEROSCLEROSIS: ICD-10-CM

## 2021-02-04 DIAGNOSIS — E11.41 DIABETIC MONONEUROPATHY ASSOCIATED WITH TYPE 2 DIABETES MELLITUS: ICD-10-CM

## 2021-02-04 DIAGNOSIS — G89.29 OTHER CHRONIC PAIN: ICD-10-CM

## 2021-02-04 DIAGNOSIS — I25.10 ATHEROSCLEROSIS OF NATIVE CORONARY ARTERY OF NATIVE HEART WITHOUT ANGINA PECTORIS: ICD-10-CM

## 2021-02-04 DIAGNOSIS — E78.00 HYPERCHOLESTEROLEMIA: ICD-10-CM

## 2021-02-04 DIAGNOSIS — E11.42 TYPE 2 DIABETES MELLITUS WITH DIABETIC POLYNEUROPATHY, WITH LONG-TERM CURRENT USE OF INSULIN: ICD-10-CM

## 2021-02-04 DIAGNOSIS — Z79.4 TYPE 2 DIABETES MELLITUS WITH STAGE 3A CHRONIC KIDNEY DISEASE, WITH LONG-TERM CURRENT USE OF INSULIN: ICD-10-CM

## 2021-02-04 DIAGNOSIS — E11.22 TYPE 2 DIABETES MELLITUS WITH STAGE 3A CHRONIC KIDNEY DISEASE, WITH LONG-TERM CURRENT USE OF INSULIN: ICD-10-CM

## 2021-02-04 DIAGNOSIS — I77.1 TORTUOUS AORTA: ICD-10-CM

## 2021-02-04 DIAGNOSIS — M79.2 NEUROPATHIC PAIN: ICD-10-CM

## 2021-02-04 DIAGNOSIS — I73.9 PAD (PERIPHERAL ARTERY DISEASE): ICD-10-CM

## 2021-02-04 PROCEDURE — 1101F PT FALLS ASSESS-DOCD LE1/YR: CPT | Mod: CPTII,S$GLB,, | Performed by: INTERNAL MEDICINE

## 2021-02-04 PROCEDURE — 1159F MED LIST DOCD IN RCRD: CPT | Mod: S$GLB,,, | Performed by: INTERNAL MEDICINE

## 2021-02-04 PROCEDURE — 99215 PR OFFICE/OUTPT VISIT, EST, LEVL V, 40-54 MIN: ICD-10-PCS | Mod: S$GLB,,, | Performed by: INTERNAL MEDICINE

## 2021-02-04 PROCEDURE — 3075F PR MOST RECENT SYSTOLIC BLOOD PRESS GE 130-139MM HG: ICD-10-PCS | Mod: CPTII,S$GLB,, | Performed by: INTERNAL MEDICINE

## 2021-02-04 PROCEDURE — 1159F PR MEDICATION LIST DOCUMENTED IN MEDICAL RECORD: ICD-10-PCS | Mod: S$GLB,,, | Performed by: INTERNAL MEDICINE

## 2021-02-04 PROCEDURE — 1126F AMNT PAIN NOTED NONE PRSNT: CPT | Mod: S$GLB,,, | Performed by: INTERNAL MEDICINE

## 2021-02-04 PROCEDURE — 99999 PR PBB SHADOW E&M-EST. PATIENT-LVL V: ICD-10-PCS | Mod: PBBFAC,,, | Performed by: INTERNAL MEDICINE

## 2021-02-04 PROCEDURE — 3072F LOW RISK FOR RETINOPATHY: CPT | Mod: S$GLB,,, | Performed by: INTERNAL MEDICINE

## 2021-02-04 PROCEDURE — 3288F FALL RISK ASSESSMENT DOCD: CPT | Mod: CPTII,S$GLB,, | Performed by: INTERNAL MEDICINE

## 2021-02-04 PROCEDURE — 3079F DIAST BP 80-89 MM HG: CPT | Mod: CPTII,S$GLB,, | Performed by: INTERNAL MEDICINE

## 2021-02-04 PROCEDURE — 3079F PR MOST RECENT DIASTOLIC BLOOD PRESSURE 80-89 MM HG: ICD-10-PCS | Mod: CPTII,S$GLB,, | Performed by: INTERNAL MEDICINE

## 2021-02-04 PROCEDURE — 1126F PR PAIN SEVERITY QUANTIFIED, NO PAIN PRESENT: ICD-10-PCS | Mod: S$GLB,,, | Performed by: INTERNAL MEDICINE

## 2021-02-04 PROCEDURE — 99999 PR PBB SHADOW E&M-EST. PATIENT-LVL V: CPT | Mod: PBBFAC,,, | Performed by: INTERNAL MEDICINE

## 2021-02-04 PROCEDURE — 3072F PR LOW RISK FOR RETINOPATHY: ICD-10-PCS | Mod: S$GLB,,, | Performed by: INTERNAL MEDICINE

## 2021-02-04 PROCEDURE — 3075F SYST BP GE 130 - 139MM HG: CPT | Mod: CPTII,S$GLB,, | Performed by: INTERNAL MEDICINE

## 2021-02-04 PROCEDURE — 1101F PR PT FALLS ASSESS DOC 0-1 FALLS W/OUT INJ PAST YR: ICD-10-PCS | Mod: CPTII,S$GLB,, | Performed by: INTERNAL MEDICINE

## 2021-02-04 PROCEDURE — 99215 OFFICE O/P EST HI 40 MIN: CPT | Mod: S$GLB,,, | Performed by: INTERNAL MEDICINE

## 2021-02-04 PROCEDURE — 3288F PR FALLS RISK ASSESSMENT DOCUMENTED: ICD-10-PCS | Mod: CPTII,S$GLB,, | Performed by: INTERNAL MEDICINE

## 2021-02-05 ENCOUNTER — TELEPHONE (OUTPATIENT)
Dept: HEMATOLOGY/ONCOLOGY | Facility: CLINIC | Age: 81
End: 2021-02-05

## 2021-02-08 ENCOUNTER — TELEPHONE (OUTPATIENT)
Dept: HEMATOLOGY/ONCOLOGY | Facility: CLINIC | Age: 81
End: 2021-02-08

## 2021-02-09 DIAGNOSIS — N18.30 TYPE 2 DIABETES MELLITUS WITH STAGE 3 CHRONIC KIDNEY DISEASE, WITH LONG-TERM CURRENT USE OF INSULIN: ICD-10-CM

## 2021-02-09 DIAGNOSIS — Z79.4 TYPE 2 DIABETES MELLITUS WITH STAGE 3 CHRONIC KIDNEY DISEASE, WITH LONG-TERM CURRENT USE OF INSULIN: ICD-10-CM

## 2021-02-09 DIAGNOSIS — E11.59 TYPE 2 DIABETES MELLITUS WITH OTHER CIRCULATORY COMPLICATION, WITH LONG-TERM CURRENT USE OF INSULIN: ICD-10-CM

## 2021-02-09 DIAGNOSIS — Z79.4 TYPE 2 DIABETES MELLITUS WITH OTHER CIRCULATORY COMPLICATION, WITH LONG-TERM CURRENT USE OF INSULIN: ICD-10-CM

## 2021-02-09 DIAGNOSIS — E11.22 TYPE 2 DIABETES MELLITUS WITH STAGE 3 CHRONIC KIDNEY DISEASE, WITH LONG-TERM CURRENT USE OF INSULIN: ICD-10-CM

## 2021-02-09 RX ORDER — BLOOD SUGAR DIAGNOSTIC
STRIP MISCELLANEOUS
Qty: 150 STRIP | Refills: 4 | Status: SHIPPED | OUTPATIENT
Start: 2021-02-09 | End: 2021-02-11 | Stop reason: SDUPTHER

## 2021-02-09 RX ORDER — ISOPROPYL ALCOHOL 70 ML/100ML
SWAB TOPICAL
Qty: 400 EACH | Refills: 3 | Status: SHIPPED | OUTPATIENT
Start: 2021-02-09 | End: 2021-02-17 | Stop reason: SDUPTHER

## 2021-02-09 RX ORDER — LANCETS
EACH MISCELLANEOUS
Qty: 400 EACH | Refills: 4 | Status: SHIPPED | OUTPATIENT
Start: 2021-02-09 | End: 2021-02-11 | Stop reason: SDUPTHER

## 2021-02-09 RX ORDER — INSULIN PUMP SYRINGE, 3 ML
EACH MISCELLANEOUS
Qty: 1 EACH | Refills: 0 | Status: SHIPPED | OUTPATIENT
Start: 2021-02-09 | End: 2024-02-07

## 2021-02-10 ENCOUNTER — OFFICE VISIT (OUTPATIENT)
Dept: INTERNAL MEDICINE | Facility: CLINIC | Age: 81
End: 2021-02-10
Payer: MEDICARE

## 2021-02-10 ENCOUNTER — TELEPHONE (OUTPATIENT)
Dept: HEMATOLOGY/ONCOLOGY | Facility: CLINIC | Age: 81
End: 2021-02-10

## 2021-02-10 VITALS
BODY MASS INDEX: 33.72 KG/M2 | SYSTOLIC BLOOD PRESSURE: 110 MMHG | DIASTOLIC BLOOD PRESSURE: 70 MMHG | OXYGEN SATURATION: 97 % | HEART RATE: 87 BPM | WEIGHT: 221.81 LBS

## 2021-02-10 DIAGNOSIS — R41.3 MEMORY LOSS: ICD-10-CM

## 2021-02-10 DIAGNOSIS — I25.119 CORONARY ARTERY DISEASE INVOLVING NATIVE CORONARY ARTERY OF NATIVE HEART WITH ANGINA PECTORIS: ICD-10-CM

## 2021-02-10 DIAGNOSIS — M54.9 OTHER CHRONIC BACK PAIN: Chronic | ICD-10-CM

## 2021-02-10 DIAGNOSIS — K21.9 GASTROESOPHAGEAL REFLUX DISEASE WITHOUT ESOPHAGITIS: ICD-10-CM

## 2021-02-10 DIAGNOSIS — E13.9 DIABETES MELLITUS DUE TO ABNORMAL INSULIN: Primary | ICD-10-CM

## 2021-02-10 DIAGNOSIS — M10.9 GOUT, ARTHRITIS: ICD-10-CM

## 2021-02-10 DIAGNOSIS — G89.29 OTHER CHRONIC BACK PAIN: Chronic | ICD-10-CM

## 2021-02-10 DIAGNOSIS — E78.2 MIXED HYPERLIPIDEMIA: ICD-10-CM

## 2021-02-10 DIAGNOSIS — I10 ESSENTIAL HYPERTENSION: Chronic | ICD-10-CM

## 2021-02-10 PROCEDURE — 99999 PR PBB SHADOW E&M-EST. PATIENT-LVL II: ICD-10-PCS | Mod: PBBFAC,,, | Performed by: INTERNAL MEDICINE

## 2021-02-10 PROCEDURE — 3078F PR MOST RECENT DIASTOLIC BLOOD PRESSURE < 80 MM HG: ICD-10-PCS | Mod: CPTII,S$GLB,, | Performed by: INTERNAL MEDICINE

## 2021-02-10 PROCEDURE — 99999 PR PBB SHADOW E&M-EST. PATIENT-LVL II: CPT | Mod: PBBFAC,,, | Performed by: INTERNAL MEDICINE

## 2021-02-10 PROCEDURE — 3074F SYST BP LT 130 MM HG: CPT | Mod: CPTII,S$GLB,, | Performed by: INTERNAL MEDICINE

## 2021-02-10 PROCEDURE — 1125F PR PAIN SEVERITY QUANTIFIED, PAIN PRESENT: ICD-10-PCS | Mod: S$GLB,,, | Performed by: INTERNAL MEDICINE

## 2021-02-10 PROCEDURE — 99214 PR OFFICE/OUTPT VISIT, EST, LEVL IV, 30-39 MIN: ICD-10-PCS | Mod: S$GLB,,, | Performed by: INTERNAL MEDICINE

## 2021-02-10 PROCEDURE — 3074F PR MOST RECENT SYSTOLIC BLOOD PRESSURE < 130 MM HG: ICD-10-PCS | Mod: CPTII,S$GLB,, | Performed by: INTERNAL MEDICINE

## 2021-02-10 PROCEDURE — 1101F PR PT FALLS ASSESS DOC 0-1 FALLS W/OUT INJ PAST YR: ICD-10-PCS | Mod: CPTII,S$GLB,, | Performed by: INTERNAL MEDICINE

## 2021-02-10 PROCEDURE — 3288F FALL RISK ASSESSMENT DOCD: CPT | Mod: CPTII,S$GLB,, | Performed by: INTERNAL MEDICINE

## 2021-02-10 PROCEDURE — 3078F DIAST BP <80 MM HG: CPT | Mod: CPTII,S$GLB,, | Performed by: INTERNAL MEDICINE

## 2021-02-10 PROCEDURE — 3072F LOW RISK FOR RETINOPATHY: CPT | Mod: S$GLB,,, | Performed by: INTERNAL MEDICINE

## 2021-02-10 PROCEDURE — 1159F PR MEDICATION LIST DOCUMENTED IN MEDICAL RECORD: ICD-10-PCS | Mod: S$GLB,,, | Performed by: INTERNAL MEDICINE

## 2021-02-10 PROCEDURE — 3288F PR FALLS RISK ASSESSMENT DOCUMENTED: ICD-10-PCS | Mod: CPTII,S$GLB,, | Performed by: INTERNAL MEDICINE

## 2021-02-10 PROCEDURE — 99214 OFFICE O/P EST MOD 30 MIN: CPT | Mod: S$GLB,,, | Performed by: INTERNAL MEDICINE

## 2021-02-10 PROCEDURE — 1101F PT FALLS ASSESS-DOCD LE1/YR: CPT | Mod: CPTII,S$GLB,, | Performed by: INTERNAL MEDICINE

## 2021-02-10 PROCEDURE — 3072F PR LOW RISK FOR RETINOPATHY: ICD-10-PCS | Mod: S$GLB,,, | Performed by: INTERNAL MEDICINE

## 2021-02-10 PROCEDURE — 1125F AMNT PAIN NOTED PAIN PRSNT: CPT | Mod: S$GLB,,, | Performed by: INTERNAL MEDICINE

## 2021-02-10 PROCEDURE — 1159F MED LIST DOCD IN RCRD: CPT | Mod: S$GLB,,, | Performed by: INTERNAL MEDICINE

## 2021-02-10 RX ORDER — OXYCODONE AND ACETAMINOPHEN 10; 325 MG/1; MG/1
1 TABLET ORAL
Qty: 150 TABLET | Refills: 0 | Status: SHIPPED | OUTPATIENT
Start: 2021-02-10 | End: 2021-03-07 | Stop reason: SDUPTHER

## 2021-02-11 DIAGNOSIS — Z79.4 TYPE 2 DIABETES MELLITUS WITH OTHER CIRCULATORY COMPLICATION, WITH LONG-TERM CURRENT USE OF INSULIN: ICD-10-CM

## 2021-02-11 DIAGNOSIS — E11.42 TYPE 2 DIABETES MELLITUS WITH DIABETIC POLYNEUROPATHY: Chronic | ICD-10-CM

## 2021-02-11 DIAGNOSIS — E11.59 TYPE 2 DIABETES MELLITUS WITH OTHER CIRCULATORY COMPLICATION, WITH LONG-TERM CURRENT USE OF INSULIN: ICD-10-CM

## 2021-02-11 RX ORDER — PEN NEEDLE, DIABETIC 29 G X1/2"
NEEDLE, DISPOSABLE MISCELLANEOUS
Qty: 400 EACH | Refills: 4 | Status: SHIPPED | OUTPATIENT
Start: 2021-02-11 | End: 2022-05-05

## 2021-02-11 RX ORDER — LANCETS
EACH MISCELLANEOUS
Qty: 400 EACH | Refills: 4 | Status: SHIPPED | OUTPATIENT
Start: 2021-02-11 | End: 2022-04-07

## 2021-02-11 RX ORDER — PEN NEEDLE, DIABETIC 31 GX5/16"
NEEDLE, DISPOSABLE MISCELLANEOUS
Qty: 130 EACH | Refills: 12 | Status: SHIPPED | OUTPATIENT
Start: 2021-02-11

## 2021-02-11 RX ORDER — BLOOD SUGAR DIAGNOSTIC
STRIP MISCELLANEOUS
Qty: 150 STRIP | Refills: 4 | Status: SHIPPED | OUTPATIENT
Start: 2021-02-11 | End: 2021-09-20

## 2021-02-17 ENCOUNTER — LAB VISIT (OUTPATIENT)
Dept: LAB | Facility: OTHER | Age: 81
End: 2021-02-17
Payer: MEDICARE

## 2021-02-17 ENCOUNTER — TELEPHONE (OUTPATIENT)
Dept: PAIN MEDICINE | Facility: CLINIC | Age: 81
End: 2021-02-17

## 2021-02-17 DIAGNOSIS — E11.22 TYPE 2 DIABETES MELLITUS WITH STAGE 3 CHRONIC KIDNEY DISEASE, WITH LONG-TERM CURRENT USE OF INSULIN: ICD-10-CM

## 2021-02-17 DIAGNOSIS — Z79.4 TYPE 2 DIABETES MELLITUS WITH STAGE 3 CHRONIC KIDNEY DISEASE, WITH LONG-TERM CURRENT USE OF INSULIN: ICD-10-CM

## 2021-02-17 DIAGNOSIS — E13.9 DIABETES MELLITUS DUE TO ABNORMAL INSULIN: ICD-10-CM

## 2021-02-17 DIAGNOSIS — N18.30 TYPE 2 DIABETES MELLITUS WITH STAGE 3 CHRONIC KIDNEY DISEASE, WITH LONG-TERM CURRENT USE OF INSULIN: ICD-10-CM

## 2021-02-17 LAB
ALBUMIN SERPL BCP-MCNC: 3.3 G/DL (ref 3.5–5.2)
ALP SERPL-CCNC: 86 U/L (ref 55–135)
ALT SERPL W/O P-5'-P-CCNC: 13 U/L (ref 10–44)
ANION GAP SERPL CALC-SCNC: 10 MMOL/L (ref 8–16)
AST SERPL-CCNC: 17 U/L (ref 10–40)
BASOPHILS # BLD AUTO: 0.05 K/UL (ref 0–0.2)
BASOPHILS NFR BLD: 0.5 % (ref 0–1.9)
BILIRUB SERPL-MCNC: 0.4 MG/DL (ref 0.1–1)
BUN SERPL-MCNC: 8 MG/DL (ref 8–23)
CALCIUM SERPL-MCNC: 8.8 MG/DL (ref 8.7–10.5)
CHLORIDE SERPL-SCNC: 101 MMOL/L (ref 95–110)
CO2 SERPL-SCNC: 32 MMOL/L (ref 23–29)
CREAT SERPL-MCNC: 1 MG/DL (ref 0.5–1.4)
DIFFERENTIAL METHOD: ABNORMAL
EOSINOPHIL # BLD AUTO: 0.2 K/UL (ref 0–0.5)
EOSINOPHIL NFR BLD: 1.9 % (ref 0–8)
ERYTHROCYTE [DISTWIDTH] IN BLOOD BY AUTOMATED COUNT: 13.9 % (ref 11.5–14.5)
EST. GFR  (AFRICAN AMERICAN): >60 ML/MIN/1.73 M^2
EST. GFR  (NON AFRICAN AMERICAN): 53 ML/MIN/1.73 M^2
ESTIMATED AVG GLUCOSE: 212 MG/DL (ref 68–131)
GLUCOSE SERPL-MCNC: 119 MG/DL (ref 70–110)
HBA1C MFR BLD: 9 % (ref 4–5.6)
HCT VFR BLD AUTO: 38.6 % (ref 37–48.5)
HGB BLD-MCNC: 12.1 G/DL (ref 12–16)
IMM GRANULOCYTES # BLD AUTO: 0.08 K/UL (ref 0–0.04)
IMM GRANULOCYTES NFR BLD AUTO: 0.7 % (ref 0–0.5)
LYMPHOCYTES # BLD AUTO: 2.1 K/UL (ref 1–4.8)
LYMPHOCYTES NFR BLD: 19.8 % (ref 18–48)
MCH RBC QN AUTO: 28 PG (ref 27–31)
MCHC RBC AUTO-ENTMCNC: 31.3 G/DL (ref 32–36)
MCV RBC AUTO: 89 FL (ref 82–98)
MONOCYTES # BLD AUTO: 0.9 K/UL (ref 0.3–1)
MONOCYTES NFR BLD: 8.2 % (ref 4–15)
NEUTROPHILS # BLD AUTO: 7.4 K/UL (ref 1.8–7.7)
NEUTROPHILS NFR BLD: 68.9 % (ref 38–73)
NRBC BLD-RTO: 0 /100 WBC
PLATELET # BLD AUTO: 254 K/UL (ref 150–350)
PMV BLD AUTO: 9.8 FL (ref 9.2–12.9)
POTASSIUM SERPL-SCNC: 3 MMOL/L (ref 3.5–5.1)
PROT SERPL-MCNC: 7.4 G/DL (ref 6–8.4)
RBC # BLD AUTO: 4.32 M/UL (ref 4–5.4)
SODIUM SERPL-SCNC: 143 MMOL/L (ref 136–145)
TSH SERPL DL<=0.005 MIU/L-ACNC: 0.95 UIU/ML (ref 0.4–4)
WBC # BLD AUTO: 10.81 K/UL (ref 3.9–12.7)

## 2021-02-17 PROCEDURE — 36415 COLL VENOUS BLD VENIPUNCTURE: CPT

## 2021-02-17 PROCEDURE — 80053 COMPREHEN METABOLIC PANEL: CPT

## 2021-02-17 PROCEDURE — 83036 HEMOGLOBIN GLYCOSYLATED A1C: CPT

## 2021-02-17 PROCEDURE — 84443 ASSAY THYROID STIM HORMONE: CPT

## 2021-02-17 PROCEDURE — 85025 COMPLETE CBC W/AUTO DIFF WBC: CPT

## 2021-02-18 RX ORDER — ISOPROPYL ALCOHOL 70 ML/100ML
SWAB TOPICAL
Qty: 400 EACH | Refills: 3 | Status: SHIPPED | OUTPATIENT
Start: 2021-02-18 | End: 2022-04-04

## 2021-03-05 ENCOUNTER — TELEPHONE (OUTPATIENT)
Dept: INTERNAL MEDICINE | Facility: CLINIC | Age: 81
End: 2021-03-05

## 2021-03-05 ENCOUNTER — OUTPATIENT CASE MANAGEMENT (OUTPATIENT)
Dept: ADMINISTRATIVE | Facility: OTHER | Age: 81
End: 2021-03-05

## 2021-03-07 RX ORDER — OXYCODONE AND ACETAMINOPHEN 10; 325 MG/1; MG/1
1 TABLET ORAL
Qty: 150 TABLET | Refills: 0 | Status: SHIPPED | OUTPATIENT
Start: 2021-03-07 | End: 2021-04-09 | Stop reason: SDUPTHER

## 2021-03-07 RX ORDER — POTASSIUM CHLORIDE 20 MEQ/1
20 TABLET, EXTENDED RELEASE ORAL 2 TIMES DAILY
Qty: 30 TABLET | Refills: 3 | Status: SHIPPED | OUTPATIENT
Start: 2021-03-07 | End: 2021-03-07 | Stop reason: SDUPTHER

## 2021-03-07 RX ORDER — POTASSIUM CHLORIDE 20 MEQ/1
20 TABLET, EXTENDED RELEASE ORAL 2 TIMES DAILY
Qty: 30 TABLET | Refills: 3 | Status: SHIPPED | OUTPATIENT
Start: 2021-03-07 | End: 2021-05-05 | Stop reason: SDUPTHER

## 2021-03-10 ENCOUNTER — OFFICE VISIT (OUTPATIENT)
Dept: INTERNAL MEDICINE | Facility: CLINIC | Age: 81
End: 2021-03-10
Payer: MEDICARE

## 2021-03-10 VITALS
WEIGHT: 231.13 LBS | HEIGHT: 68 IN | SYSTOLIC BLOOD PRESSURE: 120 MMHG | DIASTOLIC BLOOD PRESSURE: 60 MMHG | OXYGEN SATURATION: 95 % | HEART RATE: 94 BPM | BODY MASS INDEX: 35.03 KG/M2 | TEMPERATURE: 99 F

## 2021-03-10 DIAGNOSIS — K21.9 GASTROESOPHAGEAL REFLUX DISEASE WITHOUT ESOPHAGITIS: ICD-10-CM

## 2021-03-10 DIAGNOSIS — E66.9 OBESITY (BMI 30.0-34.9): ICD-10-CM

## 2021-03-10 DIAGNOSIS — R41.3 MEMORY LOSS: ICD-10-CM

## 2021-03-10 DIAGNOSIS — I10 ESSENTIAL HYPERTENSION: Chronic | ICD-10-CM

## 2021-03-10 DIAGNOSIS — I25.119 CORONARY ARTERY DISEASE INVOLVING NATIVE CORONARY ARTERY OF NATIVE HEART WITH ANGINA PECTORIS: Primary | ICD-10-CM

## 2021-03-10 DIAGNOSIS — E78.2 MIXED HYPERLIPIDEMIA: ICD-10-CM

## 2021-03-10 PROCEDURE — 3078F PR MOST RECENT DIASTOLIC BLOOD PRESSURE < 80 MM HG: ICD-10-PCS | Mod: CPTII,S$GLB,, | Performed by: INTERNAL MEDICINE

## 2021-03-10 PROCEDURE — 3072F PR LOW RISK FOR RETINOPATHY: ICD-10-PCS | Mod: S$GLB,,, | Performed by: INTERNAL MEDICINE

## 2021-03-10 PROCEDURE — 99499 RISK ADDL DX/OHS AUDIT: ICD-10-PCS | Mod: S$GLB,,, | Performed by: INTERNAL MEDICINE

## 2021-03-10 PROCEDURE — 3078F DIAST BP <80 MM HG: CPT | Mod: CPTII,S$GLB,, | Performed by: INTERNAL MEDICINE

## 2021-03-10 PROCEDURE — 1125F AMNT PAIN NOTED PAIN PRSNT: CPT | Mod: S$GLB,,, | Performed by: INTERNAL MEDICINE

## 2021-03-10 PROCEDURE — 3072F LOW RISK FOR RETINOPATHY: CPT | Mod: S$GLB,,, | Performed by: INTERNAL MEDICINE

## 2021-03-10 PROCEDURE — 3288F FALL RISK ASSESSMENT DOCD: CPT | Mod: CPTII,S$GLB,, | Performed by: INTERNAL MEDICINE

## 2021-03-10 PROCEDURE — 99499 UNLISTED E&M SERVICE: CPT | Mod: S$GLB,,, | Performed by: INTERNAL MEDICINE

## 2021-03-10 PROCEDURE — 99999 PR PBB SHADOW E&M-EST. PATIENT-LVL V: CPT | Mod: PBBFAC,,, | Performed by: INTERNAL MEDICINE

## 2021-03-10 PROCEDURE — 1101F PR PT FALLS ASSESS DOC 0-1 FALLS W/OUT INJ PAST YR: ICD-10-PCS | Mod: CPTII,S$GLB,, | Performed by: INTERNAL MEDICINE

## 2021-03-10 PROCEDURE — 1159F MED LIST DOCD IN RCRD: CPT | Mod: S$GLB,,, | Performed by: INTERNAL MEDICINE

## 2021-03-10 PROCEDURE — 3074F SYST BP LT 130 MM HG: CPT | Mod: CPTII,S$GLB,, | Performed by: INTERNAL MEDICINE

## 2021-03-10 PROCEDURE — 3288F PR FALLS RISK ASSESSMENT DOCUMENTED: ICD-10-PCS | Mod: CPTII,S$GLB,, | Performed by: INTERNAL MEDICINE

## 2021-03-10 PROCEDURE — 99999 PR PBB SHADOW E&M-EST. PATIENT-LVL V: ICD-10-PCS | Mod: PBBFAC,,, | Performed by: INTERNAL MEDICINE

## 2021-03-10 PROCEDURE — 99214 OFFICE O/P EST MOD 30 MIN: CPT | Mod: S$GLB,,, | Performed by: INTERNAL MEDICINE

## 2021-03-10 PROCEDURE — 1159F PR MEDICATION LIST DOCUMENTED IN MEDICAL RECORD: ICD-10-PCS | Mod: S$GLB,,, | Performed by: INTERNAL MEDICINE

## 2021-03-10 PROCEDURE — 99214 PR OFFICE/OUTPT VISIT, EST, LEVL IV, 30-39 MIN: ICD-10-PCS | Mod: S$GLB,,, | Performed by: INTERNAL MEDICINE

## 2021-03-10 PROCEDURE — 1101F PT FALLS ASSESS-DOCD LE1/YR: CPT | Mod: CPTII,S$GLB,, | Performed by: INTERNAL MEDICINE

## 2021-03-10 PROCEDURE — 1125F PR PAIN SEVERITY QUANTIFIED, PAIN PRESENT: ICD-10-PCS | Mod: S$GLB,,, | Performed by: INTERNAL MEDICINE

## 2021-03-10 PROCEDURE — 3074F PR MOST RECENT SYSTOLIC BLOOD PRESSURE < 130 MM HG: ICD-10-PCS | Mod: CPTII,S$GLB,, | Performed by: INTERNAL MEDICINE

## 2021-03-10 RX ORDER — LANCING DEVICE/LANCETS
KIT MISCELLANEOUS
COMMUNITY
Start: 2021-02-10

## 2021-03-12 ENCOUNTER — IMMUNIZATION (OUTPATIENT)
Dept: INTERNAL MEDICINE | Facility: CLINIC | Age: 81
End: 2021-03-12
Payer: MEDICARE

## 2021-03-12 DIAGNOSIS — Z23 NEED FOR VACCINATION: Primary | ICD-10-CM

## 2021-03-12 PROCEDURE — 91300 COVID-19, MRNA, LNP-S, PF, 30 MCG/0.3 ML DOSE VACCINE: CPT | Mod: PBBFAC | Performed by: INTERNAL MEDICINE

## 2021-03-12 PROCEDURE — 0002A COVID-19, MRNA, LNP-S, PF, 30 MCG/0.3 ML DOSE VACCINE: CPT | Mod: PBBFAC | Performed by: INTERNAL MEDICINE

## 2021-03-13 ENCOUNTER — PATIENT OUTREACH (OUTPATIENT)
Dept: ADMINISTRATIVE | Facility: OTHER | Age: 81
End: 2021-03-13

## 2021-03-15 ENCOUNTER — OFFICE VISIT (OUTPATIENT)
Dept: OBSTETRICS AND GYNECOLOGY | Facility: CLINIC | Age: 81
End: 2021-03-15
Attending: OBSTETRICS & GYNECOLOGY
Payer: MEDICARE

## 2021-03-15 VITALS
HEIGHT: 68 IN | WEIGHT: 229.5 LBS | BODY MASS INDEX: 34.78 KG/M2 | SYSTOLIC BLOOD PRESSURE: 122 MMHG | DIASTOLIC BLOOD PRESSURE: 68 MMHG

## 2021-03-15 DIAGNOSIS — Z01.419 ENCOUNTER FOR ROUTINE GYNECOLOGIC EXAMINATION IN MEDICARE PATIENT: Primary | ICD-10-CM

## 2021-03-15 DIAGNOSIS — Z12.31 SCREENING MAMMOGRAM, ENCOUNTER FOR: ICD-10-CM

## 2021-03-15 PROCEDURE — 3288F PR FALLS RISK ASSESSMENT DOCUMENTED: ICD-10-PCS | Mod: CPTII,S$GLB,, | Performed by: OBSTETRICS & GYNECOLOGY

## 2021-03-15 PROCEDURE — G0101 CA SCREEN;PELVIC/BREAST EXAM: HCPCS | Mod: S$GLB,,, | Performed by: OBSTETRICS & GYNECOLOGY

## 2021-03-15 PROCEDURE — 1126F AMNT PAIN NOTED NONE PRSNT: CPT | Mod: S$GLB,,, | Performed by: OBSTETRICS & GYNECOLOGY

## 2021-03-15 PROCEDURE — 1101F PT FALLS ASSESS-DOCD LE1/YR: CPT | Mod: CPTII,S$GLB,, | Performed by: OBSTETRICS & GYNECOLOGY

## 2021-03-15 PROCEDURE — 1101F PR PT FALLS ASSESS DOC 0-1 FALLS W/OUT INJ PAST YR: ICD-10-PCS | Mod: CPTII,S$GLB,, | Performed by: OBSTETRICS & GYNECOLOGY

## 2021-03-15 PROCEDURE — 3072F LOW RISK FOR RETINOPATHY: CPT | Mod: S$GLB,,, | Performed by: OBSTETRICS & GYNECOLOGY

## 2021-03-15 PROCEDURE — 1126F PR PAIN SEVERITY QUANTIFIED, NO PAIN PRESENT: ICD-10-PCS | Mod: S$GLB,,, | Performed by: OBSTETRICS & GYNECOLOGY

## 2021-03-15 PROCEDURE — 3288F FALL RISK ASSESSMENT DOCD: CPT | Mod: CPTII,S$GLB,, | Performed by: OBSTETRICS & GYNECOLOGY

## 2021-03-15 PROCEDURE — 3072F PR LOW RISK FOR RETINOPATHY: ICD-10-PCS | Mod: S$GLB,,, | Performed by: OBSTETRICS & GYNECOLOGY

## 2021-03-15 PROCEDURE — G0101 PR CA SCREEN;PELVIC/BREAST EXAM: ICD-10-PCS | Mod: S$GLB,,, | Performed by: OBSTETRICS & GYNECOLOGY

## 2021-03-26 ENCOUNTER — OUTPATIENT CASE MANAGEMENT (OUTPATIENT)
Dept: ADMINISTRATIVE | Facility: OTHER | Age: 81
End: 2021-03-26

## 2021-03-30 ENCOUNTER — HOSPITAL ENCOUNTER (OUTPATIENT)
Dept: CARDIOLOGY | Facility: HOSPITAL | Age: 81
Discharge: HOME | End: 2021-03-30
Attending: INTERNAL MEDICINE
Payer: MEDICARE

## 2021-03-30 DIAGNOSIS — I82.433 ACUTE DEEP VEIN THROMBOSIS (DVT) OF POPLITEAL VEIN OF BOTH LOWER EXTREMITIES: ICD-10-CM

## 2021-03-30 LAB
LEFT GREAT SAPHENOUS DISTAL THIGH DIA: 0.28 CM
LEFT GREAT SAPHENOUS DISTAL THIGH REFLUX: 725 MS
LEFT GREAT SAPHENOUS JUNCTION DIA: 0.57 CM
LEFT GREAT SAPHENOUS KNEE DIA: 0.31 CM
LEFT GREAT SAPHENOUS MIDDLE THIGH DIA: 0.42 CM
LEFT GREAT SAPHENOUS PROXIMAL CALF DIA: 0.3 CM
LEFT SMALL SAPHENOUS KNEE DIA: 0.32 CM
LEFT SMALL SAPHENOUS SPJ DIA: 0.36 CM
RIGHT GIAC DIA: 0 MM
RIGHT GREAT SAPHENOUS DISTAL THIGH DIA: 0.23 CM
RIGHT GREAT SAPHENOUS JUNCTION DIA: 0.63 CM
RIGHT GREAT SAPHENOUS KNEE DIA: 0.2 CM
RIGHT GREAT SAPHENOUS KNEE REFLUX: 531 MS
RIGHT GREAT SAPHENOUS MIDDLE THIGH DIA: 0.58 CM
RIGHT GREAT SAPHENOUS PROXIMAL CALF DIA: 0.24 CM

## 2021-03-30 PROCEDURE — 93970 EXTREMITY STUDY: CPT | Mod: TC

## 2021-03-30 PROCEDURE — 93970 EXTREMITY STUDY: CPT | Mod: 26,,, | Performed by: INTERNAL MEDICINE

## 2021-03-30 PROCEDURE — 93970 CV US LOWER VENOUS INSUFFICIENCY BILATERAL (CUPID ONLY): ICD-10-PCS | Mod: 26,,, | Performed by: INTERNAL MEDICINE

## 2021-04-06 ENCOUNTER — OFFICE VISIT (OUTPATIENT)
Dept: INTERNAL MEDICINE | Facility: CLINIC | Age: 81
End: 2021-04-06
Payer: MEDICARE

## 2021-04-06 ENCOUNTER — OUTPATIENT CASE MANAGEMENT (OUTPATIENT)
Dept: ADMINISTRATIVE | Facility: OTHER | Age: 81
End: 2021-04-06

## 2021-04-06 ENCOUNTER — OFFICE VISIT (OUTPATIENT)
Dept: CARDIOLOGY | Facility: CLINIC | Age: 81
End: 2021-04-06
Payer: MEDICARE

## 2021-04-06 VITALS
SYSTOLIC BLOOD PRESSURE: 108 MMHG | DIASTOLIC BLOOD PRESSURE: 70 MMHG | OXYGEN SATURATION: 98 % | HEART RATE: 83 BPM | HEIGHT: 68 IN | BODY MASS INDEX: 33.45 KG/M2 | WEIGHT: 220.69 LBS

## 2021-04-06 VITALS
DIASTOLIC BLOOD PRESSURE: 84 MMHG | WEIGHT: 218.25 LBS | HEIGHT: 68 IN | SYSTOLIC BLOOD PRESSURE: 126 MMHG | BODY MASS INDEX: 33.08 KG/M2 | HEART RATE: 85 BPM | OXYGEN SATURATION: 98 %

## 2021-04-06 DIAGNOSIS — I15.2 OBESITY, DIABETES, AND HYPERTENSION SYNDROME: ICD-10-CM

## 2021-04-06 DIAGNOSIS — M48.062 SPINAL STENOSIS OF LUMBAR REGION WITH NEUROGENIC CLAUDICATION: Primary | ICD-10-CM

## 2021-04-06 DIAGNOSIS — E11.59 OBESITY, DIABETES, AND HYPERTENSION SYNDROME: ICD-10-CM

## 2021-04-06 DIAGNOSIS — I10 ESSENTIAL HYPERTENSION: Chronic | ICD-10-CM

## 2021-04-06 DIAGNOSIS — E66.9 OBESITY, DIABETES, AND HYPERTENSION SYNDROME: ICD-10-CM

## 2021-04-06 DIAGNOSIS — I73.9 PAD (PERIPHERAL ARTERY DISEASE): ICD-10-CM

## 2021-04-06 DIAGNOSIS — E66.9 OBESITY (BMI 30.0-34.9): ICD-10-CM

## 2021-04-06 DIAGNOSIS — I77.819 ECTATIC AORTA: ICD-10-CM

## 2021-04-06 DIAGNOSIS — E11.22 TYPE 2 DIABETES MELLITUS WITH STAGE 3A CHRONIC KIDNEY DISEASE, WITH LONG-TERM CURRENT USE OF INSULIN: ICD-10-CM

## 2021-04-06 DIAGNOSIS — R60.0 BILATERAL LEG EDEMA: ICD-10-CM

## 2021-04-06 DIAGNOSIS — R10.9 ABDOMINAL PAIN, UNSPECIFIED ABDOMINAL LOCATION: ICD-10-CM

## 2021-04-06 DIAGNOSIS — S40.022A HEMATOMA OF ARM, LEFT, INITIAL ENCOUNTER: ICD-10-CM

## 2021-04-06 DIAGNOSIS — Z79.4 TYPE 2 DIABETES MELLITUS WITH STAGE 3A CHRONIC KIDNEY DISEASE, WITH LONG-TERM CURRENT USE OF INSULIN: ICD-10-CM

## 2021-04-06 DIAGNOSIS — E11.41 DIABETIC MONONEUROPATHY ASSOCIATED WITH TYPE 2 DIABETES MELLITUS: ICD-10-CM

## 2021-04-06 DIAGNOSIS — N18.31 TYPE 2 DIABETES MELLITUS WITH STAGE 3A CHRONIC KIDNEY DISEASE, WITH LONG-TERM CURRENT USE OF INSULIN: ICD-10-CM

## 2021-04-06 DIAGNOSIS — E11.69 OBESITY, DIABETES, AND HYPERTENSION SYNDROME: ICD-10-CM

## 2021-04-06 DIAGNOSIS — E78.2 MIXED HYPERLIPIDEMIA: ICD-10-CM

## 2021-04-06 DIAGNOSIS — I82.433 ACUTE DEEP VEIN THROMBOSIS (DVT) OF POPLITEAL VEIN OF BOTH LOWER EXTREMITIES: ICD-10-CM

## 2021-04-06 DIAGNOSIS — I87.2 VENOUS INSUFFICIENCY OF BOTH LOWER EXTREMITIES: ICD-10-CM

## 2021-04-06 DIAGNOSIS — R31.0 GROSS HEMATURIA: Primary | ICD-10-CM

## 2021-04-06 DIAGNOSIS — I77.1 TORTUOUS AORTA: ICD-10-CM

## 2021-04-06 DIAGNOSIS — M79.2 NEUROPATHIC PAIN: ICD-10-CM

## 2021-04-06 PROCEDURE — 3074F PR MOST RECENT SYSTOLIC BLOOD PRESSURE < 130 MM HG: ICD-10-PCS | Mod: CPTII,S$GLB,, | Performed by: INTERNAL MEDICINE

## 2021-04-06 PROCEDURE — 1159F PR MEDICATION LIST DOCUMENTED IN MEDICAL RECORD: ICD-10-PCS | Mod: S$GLB,,, | Performed by: INTERNAL MEDICINE

## 2021-04-06 PROCEDURE — 99215 PR OFFICE/OUTPT VISIT, EST, LEVL V, 40-54 MIN: ICD-10-PCS | Mod: S$GLB,,, | Performed by: INTERNAL MEDICINE

## 2021-04-06 PROCEDURE — 3072F LOW RISK FOR RETINOPATHY: CPT | Mod: S$GLB,,, | Performed by: INTERNAL MEDICINE

## 2021-04-06 PROCEDURE — 1125F PR PAIN SEVERITY QUANTIFIED, PAIN PRESENT: ICD-10-PCS | Mod: S$GLB,,, | Performed by: INTERNAL MEDICINE

## 2021-04-06 PROCEDURE — 3052F PR MOST RECENT HEMOGLOBIN A1C LEVEL 8.0 - < 9.0%: ICD-10-PCS | Mod: CPTII,S$GLB,, | Performed by: INTERNAL MEDICINE

## 2021-04-06 PROCEDURE — 99999 PR PBB SHADOW E&M-EST. PATIENT-LVL III: CPT | Mod: PBBFAC,,, | Performed by: INTERNAL MEDICINE

## 2021-04-06 PROCEDURE — 3079F DIAST BP 80-89 MM HG: CPT | Mod: CPTII,S$GLB,, | Performed by: INTERNAL MEDICINE

## 2021-04-06 PROCEDURE — 99213 OFFICE O/P EST LOW 20 MIN: CPT | Mod: S$GLB,,, | Performed by: INTERNAL MEDICINE

## 2021-04-06 PROCEDURE — 3072F PR LOW RISK FOR RETINOPATHY: ICD-10-PCS | Mod: S$GLB,,, | Performed by: INTERNAL MEDICINE

## 2021-04-06 PROCEDURE — 99999 PR PBB SHADOW E&M-EST. PATIENT-LVL V: CPT | Mod: PBBFAC,,, | Performed by: INTERNAL MEDICINE

## 2021-04-06 PROCEDURE — 1101F PR PT FALLS ASSESS DOC 0-1 FALLS W/OUT INJ PAST YR: ICD-10-PCS | Mod: CPTII,S$GLB,, | Performed by: INTERNAL MEDICINE

## 2021-04-06 PROCEDURE — 99213 PR OFFICE/OUTPT VISIT, EST, LEVL III, 20-29 MIN: ICD-10-PCS | Mod: S$GLB,,, | Performed by: INTERNAL MEDICINE

## 2021-04-06 PROCEDURE — 3078F DIAST BP <80 MM HG: CPT | Mod: CPTII,S$GLB,, | Performed by: INTERNAL MEDICINE

## 2021-04-06 PROCEDURE — 1125F AMNT PAIN NOTED PAIN PRSNT: CPT | Mod: S$GLB,,, | Performed by: INTERNAL MEDICINE

## 2021-04-06 PROCEDURE — 1159F MED LIST DOCD IN RCRD: CPT | Mod: S$GLB,,, | Performed by: INTERNAL MEDICINE

## 2021-04-06 PROCEDURE — 99999 PR PBB SHADOW E&M-EST. PATIENT-LVL III: ICD-10-PCS | Mod: PBBFAC,,, | Performed by: INTERNAL MEDICINE

## 2021-04-06 PROCEDURE — 99999 PR PBB SHADOW E&M-EST. PATIENT-LVL V: ICD-10-PCS | Mod: PBBFAC,,, | Performed by: INTERNAL MEDICINE

## 2021-04-06 PROCEDURE — 3288F PR FALLS RISK ASSESSMENT DOCUMENTED: ICD-10-PCS | Mod: CPTII,S$GLB,, | Performed by: INTERNAL MEDICINE

## 2021-04-06 PROCEDURE — 3052F HG A1C>EQUAL 8.0%<EQUAL 9.0%: CPT | Mod: CPTII,S$GLB,, | Performed by: INTERNAL MEDICINE

## 2021-04-06 PROCEDURE — 3074F SYST BP LT 130 MM HG: CPT | Mod: CPTII,S$GLB,, | Performed by: INTERNAL MEDICINE

## 2021-04-06 PROCEDURE — 3078F PR MOST RECENT DIASTOLIC BLOOD PRESSURE < 80 MM HG: ICD-10-PCS | Mod: CPTII,S$GLB,, | Performed by: INTERNAL MEDICINE

## 2021-04-06 PROCEDURE — 3288F FALL RISK ASSESSMENT DOCD: CPT | Mod: CPTII,S$GLB,, | Performed by: INTERNAL MEDICINE

## 2021-04-06 PROCEDURE — 1101F PT FALLS ASSESS-DOCD LE1/YR: CPT | Mod: CPTII,S$GLB,, | Performed by: INTERNAL MEDICINE

## 2021-04-06 PROCEDURE — 3079F PR MOST RECENT DIASTOLIC BLOOD PRESSURE 80-89 MM HG: ICD-10-PCS | Mod: CPTII,S$GLB,, | Performed by: INTERNAL MEDICINE

## 2021-04-06 PROCEDURE — 99215 OFFICE O/P EST HI 40 MIN: CPT | Mod: S$GLB,,, | Performed by: INTERNAL MEDICINE

## 2021-04-07 ENCOUNTER — TELEPHONE (OUTPATIENT)
Dept: PAIN MEDICINE | Facility: CLINIC | Age: 81
End: 2021-04-07

## 2021-04-08 ENCOUNTER — OFFICE VISIT (OUTPATIENT)
Dept: PAIN MEDICINE | Facility: CLINIC | Age: 81
End: 2021-04-08
Payer: MEDICARE

## 2021-04-08 VITALS
HEIGHT: 68 IN | DIASTOLIC BLOOD PRESSURE: 77 MMHG | TEMPERATURE: 97 F | BODY MASS INDEX: 33.34 KG/M2 | RESPIRATION RATE: 18 BRPM | SYSTOLIC BLOOD PRESSURE: 122 MMHG | WEIGHT: 220 LBS | HEART RATE: 79 BPM

## 2021-04-08 DIAGNOSIS — M50.30 DDD (DEGENERATIVE DISC DISEASE), CERVICAL: ICD-10-CM

## 2021-04-08 DIAGNOSIS — M47.812 CERVICAL SPONDYLOSIS: ICD-10-CM

## 2021-04-08 DIAGNOSIS — M51.36 DDD (DEGENERATIVE DISC DISEASE), LUMBAR: ICD-10-CM

## 2021-04-08 DIAGNOSIS — M47.816 LUMBAR SPONDYLOSIS: ICD-10-CM

## 2021-04-08 DIAGNOSIS — M48.062 SPINAL STENOSIS OF LUMBAR REGION WITH NEUROGENIC CLAUDICATION: ICD-10-CM

## 2021-04-08 DIAGNOSIS — G89.4 CHRONIC PAIN DISORDER: ICD-10-CM

## 2021-04-08 DIAGNOSIS — M79.18 MYOFASCIAL PAIN: ICD-10-CM

## 2021-04-08 DIAGNOSIS — M54.16 LUMBAR RADICULOPATHY: Primary | ICD-10-CM

## 2021-04-08 PROCEDURE — 99213 OFFICE O/P EST LOW 20 MIN: CPT | Mod: S$GLB,,, | Performed by: NURSE PRACTITIONER

## 2021-04-08 PROCEDURE — 99999 PR PBB SHADOW E&M-EST. PATIENT-LVL V: ICD-10-PCS | Mod: PBBFAC,,, | Performed by: NURSE PRACTITIONER

## 2021-04-08 PROCEDURE — 1159F PR MEDICATION LIST DOCUMENTED IN MEDICAL RECORD: ICD-10-PCS | Mod: S$GLB,,, | Performed by: NURSE PRACTITIONER

## 2021-04-08 PROCEDURE — 99999 PR PBB SHADOW E&M-EST. PATIENT-LVL V: CPT | Mod: PBBFAC,,, | Performed by: NURSE PRACTITIONER

## 2021-04-08 PROCEDURE — 1159F MED LIST DOCD IN RCRD: CPT | Mod: S$GLB,,, | Performed by: NURSE PRACTITIONER

## 2021-04-08 PROCEDURE — 3288F PR FALLS RISK ASSESSMENT DOCUMENTED: ICD-10-PCS | Mod: CPTII,S$GLB,, | Performed by: NURSE PRACTITIONER

## 2021-04-08 PROCEDURE — 99213 PR OFFICE/OUTPT VISIT, EST, LEVL III, 20-29 MIN: ICD-10-PCS | Mod: S$GLB,,, | Performed by: NURSE PRACTITIONER

## 2021-04-08 PROCEDURE — 1101F PT FALLS ASSESS-DOCD LE1/YR: CPT | Mod: CPTII,S$GLB,, | Performed by: NURSE PRACTITIONER

## 2021-04-08 PROCEDURE — 3072F LOW RISK FOR RETINOPATHY: CPT | Mod: S$GLB,,, | Performed by: NURSE PRACTITIONER

## 2021-04-08 PROCEDURE — 1125F PR PAIN SEVERITY QUANTIFIED, PAIN PRESENT: ICD-10-PCS | Mod: S$GLB,,, | Performed by: NURSE PRACTITIONER

## 2021-04-08 PROCEDURE — 3072F PR LOW RISK FOR RETINOPATHY: ICD-10-PCS | Mod: S$GLB,,, | Performed by: NURSE PRACTITIONER

## 2021-04-08 PROCEDURE — 1125F AMNT PAIN NOTED PAIN PRSNT: CPT | Mod: S$GLB,,, | Performed by: NURSE PRACTITIONER

## 2021-04-08 PROCEDURE — 3288F FALL RISK ASSESSMENT DOCD: CPT | Mod: CPTII,S$GLB,, | Performed by: NURSE PRACTITIONER

## 2021-04-08 PROCEDURE — 1101F PR PT FALLS ASSESS DOC 0-1 FALLS W/OUT INJ PAST YR: ICD-10-PCS | Mod: CPTII,S$GLB,, | Performed by: NURSE PRACTITIONER

## 2021-04-09 ENCOUNTER — TELEPHONE (OUTPATIENT)
Dept: GASTROENTEROLOGY | Facility: CLINIC | Age: 81
End: 2021-04-09

## 2021-04-09 RX ORDER — OXYCODONE AND ACETAMINOPHEN 10; 325 MG/1; MG/1
1 TABLET ORAL
Qty: 150 TABLET | Refills: 0 | Status: SHIPPED | OUTPATIENT
Start: 2021-04-09 | End: 2021-05-05 | Stop reason: SDUPTHER

## 2021-04-14 ENCOUNTER — LAB VISIT (OUTPATIENT)
Dept: LAB | Facility: HOSPITAL | Age: 81
End: 2021-04-14
Attending: INTERNAL MEDICINE
Payer: MEDICARE

## 2021-04-14 ENCOUNTER — OFFICE VISIT (OUTPATIENT)
Dept: INTERNAL MEDICINE | Facility: CLINIC | Age: 81
End: 2021-04-14
Payer: MEDICARE

## 2021-04-14 VITALS
HEIGHT: 68 IN | BODY MASS INDEX: 34.65 KG/M2 | SYSTOLIC BLOOD PRESSURE: 134 MMHG | HEART RATE: 81 BPM | DIASTOLIC BLOOD PRESSURE: 80 MMHG | OXYGEN SATURATION: 96 % | WEIGHT: 228.63 LBS

## 2021-04-14 DIAGNOSIS — E66.9 OBESITY (BMI 30.0-34.9): ICD-10-CM

## 2021-04-14 DIAGNOSIS — E11.42 TYPE 2 DIABETES MELLITUS WITH DIABETIC POLYNEUROPATHY, WITH LONG-TERM CURRENT USE OF INSULIN: ICD-10-CM

## 2021-04-14 DIAGNOSIS — M10.9 GOUT, ARTHRITIS: ICD-10-CM

## 2021-04-14 DIAGNOSIS — K21.9 GASTROESOPHAGEAL REFLUX DISEASE WITHOUT ESOPHAGITIS: ICD-10-CM

## 2021-04-14 DIAGNOSIS — Z79.4 TYPE 2 DIABETES MELLITUS WITH DIABETIC POLYNEUROPATHY, WITH LONG-TERM CURRENT USE OF INSULIN: ICD-10-CM

## 2021-04-14 DIAGNOSIS — R10.9 ABDOMINAL PAIN, UNSPECIFIED ABDOMINAL LOCATION: ICD-10-CM

## 2021-04-14 DIAGNOSIS — M54.9 OTHER CHRONIC BACK PAIN: Chronic | ICD-10-CM

## 2021-04-14 DIAGNOSIS — R41.3 MEMORY LOSS: ICD-10-CM

## 2021-04-14 DIAGNOSIS — I25.119 CORONARY ARTERY DISEASE INVOLVING NATIVE CORONARY ARTERY OF NATIVE HEART WITH ANGINA PECTORIS: ICD-10-CM

## 2021-04-14 DIAGNOSIS — G89.29 OTHER CHRONIC BACK PAIN: Chronic | ICD-10-CM

## 2021-04-14 DIAGNOSIS — R31.9 HEMATURIA, UNSPECIFIED TYPE: Primary | ICD-10-CM

## 2021-04-14 LAB
ALBUMIN SERPL BCP-MCNC: 3.2 G/DL (ref 3.5–5.2)
ALP SERPL-CCNC: 96 U/L (ref 55–135)
ALT SERPL W/O P-5'-P-CCNC: 15 U/L (ref 10–44)
ANION GAP SERPL CALC-SCNC: 13 MMOL/L (ref 8–16)
AST SERPL-CCNC: 19 U/L (ref 10–40)
BASOPHILS # BLD AUTO: 0.07 K/UL (ref 0–0.2)
BASOPHILS NFR BLD: 0.7 % (ref 0–1.9)
BILIRUB SERPL-MCNC: 0.3 MG/DL (ref 0.1–1)
BILIRUB UR QL STRIP: NEGATIVE
BUN SERPL-MCNC: 9 MG/DL (ref 8–23)
CALCIUM SERPL-MCNC: 9.1 MG/DL (ref 8.7–10.5)
CHLORIDE SERPL-SCNC: 104 MMOL/L (ref 95–110)
CLARITY UR REFRACT.AUTO: CLEAR
CO2 SERPL-SCNC: 26 MMOL/L (ref 23–29)
COLOR UR AUTO: YELLOW
CREAT SERPL-MCNC: 1.3 MG/DL (ref 0.5–1.4)
DIFFERENTIAL METHOD: ABNORMAL
EOSINOPHIL # BLD AUTO: 0.3 K/UL (ref 0–0.5)
EOSINOPHIL NFR BLD: 3.3 % (ref 0–8)
ERYTHROCYTE [DISTWIDTH] IN BLOOD BY AUTOMATED COUNT: 13.6 % (ref 11.5–14.5)
EST. GFR  (AFRICAN AMERICAN): 44.5 ML/MIN/1.73 M^2
EST. GFR  (NON AFRICAN AMERICAN): 38.6 ML/MIN/1.73 M^2
ESTIMATED AVG GLUCOSE: 214 MG/DL (ref 68–131)
GLUCOSE SERPL-MCNC: 163 MG/DL (ref 70–110)
GLUCOSE UR QL STRIP: NEGATIVE
HBA1C MFR BLD: 9.1 % (ref 4–5.6)
HCT VFR BLD AUTO: 36.9 % (ref 37–48.5)
HGB BLD-MCNC: 11.2 G/DL (ref 12–16)
HGB UR QL STRIP: NEGATIVE
IMM GRANULOCYTES # BLD AUTO: 0.08 K/UL (ref 0–0.04)
IMM GRANULOCYTES NFR BLD AUTO: 0.8 % (ref 0–0.5)
KETONES UR QL STRIP: NEGATIVE
LEUKOCYTE ESTERASE UR QL STRIP: NEGATIVE
LYMPHOCYTES # BLD AUTO: 2.4 K/UL (ref 1–4.8)
LYMPHOCYTES NFR BLD: 23.9 % (ref 18–48)
MCH RBC QN AUTO: 27.7 PG (ref 27–31)
MCHC RBC AUTO-ENTMCNC: 30.4 G/DL (ref 32–36)
MCV RBC AUTO: 91 FL (ref 82–98)
MONOCYTES # BLD AUTO: 0.9 K/UL (ref 0.3–1)
MONOCYTES NFR BLD: 9.1 % (ref 4–15)
NEUTROPHILS # BLD AUTO: 6.2 K/UL (ref 1.8–7.7)
NEUTROPHILS NFR BLD: 62.2 % (ref 38–73)
NITRITE UR QL STRIP: NEGATIVE
NRBC BLD-RTO: 0 /100 WBC
PH UR STRIP: 5 [PH] (ref 5–8)
PLATELET # BLD AUTO: 315 K/UL (ref 150–450)
PMV BLD AUTO: 10.2 FL (ref 9.2–12.9)
POTASSIUM SERPL-SCNC: 3.9 MMOL/L (ref 3.5–5.1)
PROT SERPL-MCNC: 7.9 G/DL (ref 6–8.4)
PROT UR QL STRIP: NEGATIVE
RBC # BLD AUTO: 4.05 M/UL (ref 4–5.4)
SODIUM SERPL-SCNC: 143 MMOL/L (ref 136–145)
SP GR UR STRIP: 1.01 (ref 1–1.03)
URN SPEC COLLECT METH UR: NORMAL
WBC # BLD AUTO: 9.92 K/UL (ref 3.9–12.7)

## 2021-04-14 PROCEDURE — 99214 OFFICE O/P EST MOD 30 MIN: CPT | Mod: S$GLB,,, | Performed by: INTERNAL MEDICINE

## 2021-04-14 PROCEDURE — 83036 HEMOGLOBIN GLYCOSYLATED A1C: CPT | Performed by: INTERNAL MEDICINE

## 2021-04-14 PROCEDURE — 80053 COMPREHEN METABOLIC PANEL: CPT | Performed by: INTERNAL MEDICINE

## 2021-04-14 PROCEDURE — 3072F LOW RISK FOR RETINOPATHY: CPT | Mod: S$GLB,,, | Performed by: INTERNAL MEDICINE

## 2021-04-14 PROCEDURE — 1125F AMNT PAIN NOTED PAIN PRSNT: CPT | Mod: S$GLB,,, | Performed by: INTERNAL MEDICINE

## 2021-04-14 PROCEDURE — 3072F PR LOW RISK FOR RETINOPATHY: ICD-10-PCS | Mod: S$GLB,,, | Performed by: INTERNAL MEDICINE

## 2021-04-14 PROCEDURE — 3052F HG A1C>EQUAL 8.0%<EQUAL 9.0%: CPT | Mod: CPTII,S$GLB,, | Performed by: INTERNAL MEDICINE

## 2021-04-14 PROCEDURE — 85025 COMPLETE CBC W/AUTO DIFF WBC: CPT | Performed by: INTERNAL MEDICINE

## 2021-04-14 PROCEDURE — 99999 PR PBB SHADOW E&M-EST. PATIENT-LVL V: ICD-10-PCS | Mod: PBBFAC,,, | Performed by: INTERNAL MEDICINE

## 2021-04-14 PROCEDURE — 87086 URINE CULTURE/COLONY COUNT: CPT | Performed by: INTERNAL MEDICINE

## 2021-04-14 PROCEDURE — 3052F PR MOST RECENT HEMOGLOBIN A1C LEVEL 8.0 - < 9.0%: ICD-10-PCS | Mod: CPTII,S$GLB,, | Performed by: INTERNAL MEDICINE

## 2021-04-14 PROCEDURE — 1101F PT FALLS ASSESS-DOCD LE1/YR: CPT | Mod: CPTII,S$GLB,, | Performed by: INTERNAL MEDICINE

## 2021-04-14 PROCEDURE — 36415 COLL VENOUS BLD VENIPUNCTURE: CPT | Performed by: INTERNAL MEDICINE

## 2021-04-14 PROCEDURE — 81003 URINALYSIS AUTO W/O SCOPE: CPT | Performed by: INTERNAL MEDICINE

## 2021-04-14 PROCEDURE — 1101F PR PT FALLS ASSESS DOC 0-1 FALLS W/OUT INJ PAST YR: ICD-10-PCS | Mod: CPTII,S$GLB,, | Performed by: INTERNAL MEDICINE

## 2021-04-14 PROCEDURE — 1159F MED LIST DOCD IN RCRD: CPT | Mod: S$GLB,,, | Performed by: INTERNAL MEDICINE

## 2021-04-14 PROCEDURE — 99214 PR OFFICE/OUTPT VISIT, EST, LEVL IV, 30-39 MIN: ICD-10-PCS | Mod: S$GLB,,, | Performed by: INTERNAL MEDICINE

## 2021-04-14 PROCEDURE — 3288F PR FALLS RISK ASSESSMENT DOCUMENTED: ICD-10-PCS | Mod: CPTII,S$GLB,, | Performed by: INTERNAL MEDICINE

## 2021-04-14 PROCEDURE — 3288F FALL RISK ASSESSMENT DOCD: CPT | Mod: CPTII,S$GLB,, | Performed by: INTERNAL MEDICINE

## 2021-04-14 PROCEDURE — 1125F PR PAIN SEVERITY QUANTIFIED, PAIN PRESENT: ICD-10-PCS | Mod: S$GLB,,, | Performed by: INTERNAL MEDICINE

## 2021-04-14 PROCEDURE — 99999 PR PBB SHADOW E&M-EST. PATIENT-LVL V: CPT | Mod: PBBFAC,,, | Performed by: INTERNAL MEDICINE

## 2021-04-14 PROCEDURE — 1159F PR MEDICATION LIST DOCUMENTED IN MEDICAL RECORD: ICD-10-PCS | Mod: S$GLB,,, | Performed by: INTERNAL MEDICINE

## 2021-04-15 LAB — BACTERIA UR CULT: NO GROWTH

## 2021-04-16 ENCOUNTER — TELEPHONE (OUTPATIENT)
Dept: INTERNAL MEDICINE | Facility: CLINIC | Age: 81
End: 2021-04-16

## 2021-04-20 ENCOUNTER — HOSPITAL ENCOUNTER (OUTPATIENT)
Dept: RADIOLOGY | Facility: HOSPITAL | Age: 81
Discharge: HOME | End: 2021-04-20
Attending: INTERNAL MEDICINE
Payer: MEDICARE

## 2021-04-20 DIAGNOSIS — R10.9 ABDOMINAL PAIN, UNSPECIFIED ABDOMINAL LOCATION: ICD-10-CM

## 2021-04-20 DIAGNOSIS — R31.9 HEMATURIA, UNSPECIFIED TYPE: ICD-10-CM

## 2021-04-20 PROCEDURE — 74176 CT ABD & PELVIS W/O CONTRAST: CPT | Mod: 26,,, | Performed by: RADIOLOGY

## 2021-04-20 PROCEDURE — 74176 CT RENAL STONE STUDY ABD PELVIS WO: ICD-10-PCS | Mod: 26,,, | Performed by: RADIOLOGY

## 2021-04-20 PROCEDURE — 74176 CT ABD & PELVIS W/O CONTRAST: CPT | Mod: TC

## 2021-04-24 ENCOUNTER — PATIENT OUTREACH (OUTPATIENT)
Dept: ADMINISTRATIVE | Facility: OTHER | Age: 81
End: 2021-04-24

## 2021-04-26 ENCOUNTER — PES CALL (OUTPATIENT)
Dept: ADMINISTRATIVE | Facility: CLINIC | Age: 81
End: 2021-04-26

## 2021-04-26 ENCOUNTER — TELEPHONE (OUTPATIENT)
Dept: INTERNAL MEDICINE | Facility: CLINIC | Age: 81
End: 2021-04-26

## 2021-04-26 NOTE — TELEPHONE ENCOUNTER
----- Message from Meño Nolasco sent at 6/8/2017  8:28 AM CDT -----  Contact: self/ [phone] home  Type: Rx    Name of medication(s):  Cough syrup/ medication for the diverticulitis    Is this a refill? New rx? new    Who prescribed medication?    Pharmacy Name, Phone, & Location: [facility] on [address]    Comments: Pt states that the doctor was supposed to send in some cough syrup after her last visit, but the pharmacy hasn't received anything, she wanted it sent in to [facility], not [facility]. She is still experiencing the cough she had when coming to the clinic.  The pt would also like to request some medication for her stomach and she thinks she also has some indigestion, but can't remember the name of the medication she was given for it.  She would like to speak with someone in the office today, if possible.  Please call and advise.    Thank you    
----- Message from Meño Nolasco sent at 6/8/2017  8:28 AM CDT -----  Contact: self/ [phone] home  Type: Rx    Name of medication(s):  Cough syrup/ medication for the diverticulitis    Is this a refill? New rx? new    Who prescribed medication?    Pharmacy Name, Phone, & Location: [facility] on [address]    Comments: Pt states that the doctor was supposed to send in some cough syrup after her last visit, but the pharmacy hasn't received anything, she wanted it sent in to [facility], not [facility]. She is still experiencing the cough she had when coming to the clinic.  The pt would also like to request some medication for her stomach and she thinks she also has some indigestion, but can't remember the name of the medication she was given for it.  She would like to speak with someone in the office today, if possible.  Please call and advise.    Thank you    
Mail box full  
Message left  
No answer  
full weight-bearing

## 2021-04-30 ENCOUNTER — OFFICE VISIT (OUTPATIENT)
Dept: GASTROENTEROLOGY | Facility: CLINIC | Age: 81
End: 2021-04-30
Payer: MEDICARE

## 2021-04-30 VITALS
BODY MASS INDEX: 34.21 KG/M2 | WEIGHT: 225.75 LBS | DIASTOLIC BLOOD PRESSURE: 72 MMHG | SYSTOLIC BLOOD PRESSURE: 140 MMHG | HEIGHT: 68 IN

## 2021-04-30 DIAGNOSIS — K55.1 SUPERIOR MESENTERIC ARTERY STENOSIS: ICD-10-CM

## 2021-04-30 DIAGNOSIS — R63.5 WEIGHT GAIN: ICD-10-CM

## 2021-04-30 DIAGNOSIS — R10.11 CHRONIC RUQ PAIN: ICD-10-CM

## 2021-04-30 DIAGNOSIS — I77.1 CELIAC ARTERY STENOSIS: Primary | ICD-10-CM

## 2021-04-30 DIAGNOSIS — R10.33 PERIUMBILICAL ABDOMINAL PAIN: ICD-10-CM

## 2021-04-30 DIAGNOSIS — G89.29 CHRONIC RUQ PAIN: ICD-10-CM

## 2021-04-30 PROCEDURE — 99999 PR PBB SHADOW E&M-EST. PATIENT-LVL IV: ICD-10-PCS | Mod: PBBFAC,,, | Performed by: INTERNAL MEDICINE

## 2021-04-30 PROCEDURE — 99204 PR OFFICE/OUTPT VISIT, NEW, LEVL IV, 45-59 MIN: ICD-10-PCS | Mod: S$GLB,,, | Performed by: INTERNAL MEDICINE

## 2021-04-30 PROCEDURE — 1101F PT FALLS ASSESS-DOCD LE1/YR: CPT | Mod: CPTII,S$GLB,, | Performed by: INTERNAL MEDICINE

## 2021-04-30 PROCEDURE — 3072F LOW RISK FOR RETINOPATHY: CPT | Mod: S$GLB,,, | Performed by: INTERNAL MEDICINE

## 2021-04-30 PROCEDURE — 1159F MED LIST DOCD IN RCRD: CPT | Mod: S$GLB,,, | Performed by: INTERNAL MEDICINE

## 2021-04-30 PROCEDURE — 99999 PR PBB SHADOW E&M-EST. PATIENT-LVL IV: CPT | Mod: PBBFAC,,, | Performed by: INTERNAL MEDICINE

## 2021-04-30 PROCEDURE — 3072F PR LOW RISK FOR RETINOPATHY: ICD-10-PCS | Mod: S$GLB,,, | Performed by: INTERNAL MEDICINE

## 2021-04-30 PROCEDURE — 3288F FALL RISK ASSESSMENT DOCD: CPT | Mod: CPTII,S$GLB,, | Performed by: INTERNAL MEDICINE

## 2021-04-30 PROCEDURE — 1159F PR MEDICATION LIST DOCUMENTED IN MEDICAL RECORD: ICD-10-PCS | Mod: S$GLB,,, | Performed by: INTERNAL MEDICINE

## 2021-04-30 PROCEDURE — 1125F AMNT PAIN NOTED PAIN PRSNT: CPT | Mod: S$GLB,,, | Performed by: INTERNAL MEDICINE

## 2021-04-30 PROCEDURE — 3288F PR FALLS RISK ASSESSMENT DOCUMENTED: ICD-10-PCS | Mod: CPTII,S$GLB,, | Performed by: INTERNAL MEDICINE

## 2021-04-30 PROCEDURE — 99204 OFFICE O/P NEW MOD 45 MIN: CPT | Mod: S$GLB,,, | Performed by: INTERNAL MEDICINE

## 2021-04-30 PROCEDURE — 1125F PR PAIN SEVERITY QUANTIFIED, PAIN PRESENT: ICD-10-PCS | Mod: S$GLB,,, | Performed by: INTERNAL MEDICINE

## 2021-04-30 PROCEDURE — 1101F PR PT FALLS ASSESS DOC 0-1 FALLS W/OUT INJ PAST YR: ICD-10-PCS | Mod: CPTII,S$GLB,, | Performed by: INTERNAL MEDICINE

## 2021-05-04 ENCOUNTER — TELEPHONE (OUTPATIENT)
Dept: GASTROENTEROLOGY | Facility: CLINIC | Age: 81
End: 2021-05-04

## 2021-05-07 ENCOUNTER — HOSPITAL ENCOUNTER (OUTPATIENT)
Facility: OTHER | Age: 81
Discharge: HOME | End: 2021-05-07
Attending: ANESTHESIOLOGY | Admitting: ANESTHESIOLOGY
Payer: MEDICARE

## 2021-05-07 VITALS
BODY MASS INDEX: 33.65 KG/M2 | SYSTOLIC BLOOD PRESSURE: 110 MMHG | TEMPERATURE: 99 F | HEIGHT: 68 IN | OXYGEN SATURATION: 98 % | RESPIRATION RATE: 16 BRPM | DIASTOLIC BLOOD PRESSURE: 60 MMHG | HEART RATE: 70 BPM | WEIGHT: 222 LBS

## 2021-05-07 DIAGNOSIS — M47.816 LUMBAR SPONDYLOSIS: Primary | ICD-10-CM

## 2021-05-07 DIAGNOSIS — M54.16 LUMBAR RADICULOPATHY: ICD-10-CM

## 2021-05-07 DIAGNOSIS — G89.29 CHRONIC PAIN: ICD-10-CM

## 2021-05-07 LAB — POCT GLUCOSE: 214 MG/DL (ref 70–110)

## 2021-05-07 PROCEDURE — 25000003 PHARM REV CODE 250: Performed by: ANESTHESIOLOGY

## 2021-05-07 PROCEDURE — 25500020 PHARM REV CODE 255: Performed by: ANESTHESIOLOGY

## 2021-05-07 PROCEDURE — 64483 PR EPIDURAL INJ, ANES/STEROID, TRANSFORAMINAL, LUMB/SACR, SNGL LEVL: ICD-10-PCS | Mod: 50,,, | Performed by: ANESTHESIOLOGY

## 2021-05-07 PROCEDURE — 64483 NJX AA&/STRD TFRM EPI L/S 1: CPT | Mod: 50 | Performed by: ANESTHESIOLOGY

## 2021-05-07 PROCEDURE — 64483 NJX AA&/STRD TFRM EPI L/S 1: CPT | Mod: 50,,, | Performed by: ANESTHESIOLOGY

## 2021-05-07 PROCEDURE — 63600175 PHARM REV CODE 636 W HCPCS: Performed by: ANESTHESIOLOGY

## 2021-05-07 RX ORDER — SODIUM CHLORIDE 9 MG/ML
INJECTION, SOLUTION INTRAVENOUS CONTINUOUS
Status: DISCONTINUED | OUTPATIENT
Start: 2021-05-07 | End: 2023-04-19

## 2021-05-07 RX ORDER — LIDOCAINE HYDROCHLORIDE 5 MG/ML
INJECTION, SOLUTION INFILTRATION; INTRAVENOUS
Status: DISCONTINUED | OUTPATIENT
Start: 2021-05-07 | End: 2021-05-07 | Stop reason: HOSPADM

## 2021-05-07 RX ORDER — SODIUM CHLORIDE 9 MG/ML
INJECTION, SOLUTION INTRAVENOUS CONTINUOUS
Status: DISCONTINUED | OUTPATIENT
Start: 2021-05-07 | End: 2021-05-07 | Stop reason: HOSPADM

## 2021-05-07 RX ORDER — LIDOCAINE HYDROCHLORIDE 10 MG/ML
INJECTION INFILTRATION; PERINEURAL
Status: DISCONTINUED | OUTPATIENT
Start: 2021-05-07 | End: 2021-05-07 | Stop reason: HOSPADM

## 2021-05-07 RX ORDER — DEXAMETHASONE SODIUM PHOSPHATE 10 MG/ML
INJECTION INTRAMUSCULAR; INTRAVENOUS
Status: DISCONTINUED | OUTPATIENT
Start: 2021-05-07 | End: 2021-05-07 | Stop reason: HOSPADM

## 2021-05-07 RX ORDER — MIDAZOLAM HYDROCHLORIDE 1 MG/ML
INJECTION INTRAMUSCULAR; INTRAVENOUS
Status: DISCONTINUED | OUTPATIENT
Start: 2021-05-07 | End: 2021-05-07 | Stop reason: HOSPADM

## 2021-05-07 RX ORDER — FENTANYL CITRATE 50 UG/ML
INJECTION, SOLUTION INTRAMUSCULAR; INTRAVENOUS
Status: DISCONTINUED | OUTPATIENT
Start: 2021-05-07 | End: 2021-05-07 | Stop reason: HOSPADM

## 2021-05-10 ENCOUNTER — LAB VISIT (OUTPATIENT)
Dept: LAB | Facility: HOSPITAL | Age: 81
End: 2021-05-10
Attending: INTERNAL MEDICINE
Payer: MEDICARE

## 2021-05-10 DIAGNOSIS — K55.1 SUPERIOR MESENTERIC ARTERY STENOSIS: ICD-10-CM

## 2021-05-10 DIAGNOSIS — R10.33 PERIUMBILICAL ABDOMINAL PAIN: ICD-10-CM

## 2021-05-10 DIAGNOSIS — G89.29 CHRONIC RUQ PAIN: ICD-10-CM

## 2021-05-10 DIAGNOSIS — R10.11 CHRONIC RUQ PAIN: ICD-10-CM

## 2021-05-10 DIAGNOSIS — R10.11 RIGHT UPPER QUADRANT PAIN: Primary | ICD-10-CM

## 2021-05-10 DIAGNOSIS — R63.5 WEIGHT GAIN: ICD-10-CM

## 2021-05-10 DIAGNOSIS — I77.1 CELIAC ARTERY STENOSIS: ICD-10-CM

## 2021-05-10 LAB
ALBUMIN SERPL BCP-MCNC: 3.4 G/DL (ref 3.5–5.2)
ALP SERPL-CCNC: 85 U/L (ref 55–135)
ALT SERPL W/O P-5'-P-CCNC: 19 U/L (ref 10–44)
AST SERPL-CCNC: 24 U/L (ref 10–40)
BILIRUB DIRECT SERPL-MCNC: 0.2 MG/DL (ref 0.1–0.3)
BILIRUB SERPL-MCNC: 0.4 MG/DL (ref 0.1–1)
FERRITIN SERPL-MCNC: 70 NG/ML (ref 20–300)
HBV SURFACE AG SERPL QL IA: NEGATIVE
HCV AB SERPL QL IA: ABNORMAL
HEPATITIS A ANTIBODY, IGG: POSITIVE
HGB BLD-MCNC: 11.7 G/DL (ref 12–16)
IGA SERPL-MCNC: 251 MG/DL (ref 40–350)
IRON SERPL-MCNC: 57 UG/DL (ref 30–160)
LIPASE SERPL-CCNC: 27 U/L (ref 4–60)
PROT SERPL-MCNC: 7.7 G/DL (ref 6–8.4)
SATURATED IRON: 17 % (ref 20–50)
TOTAL IRON BINDING CAPACITY: 342 UG/DL (ref 250–450)
TRANSFERRIN SERPL-MCNC: 231 MG/DL (ref 200–375)

## 2021-05-10 PROCEDURE — 87340 HEPATITIS B SURFACE AG IA: CPT | Performed by: INTERNAL MEDICINE

## 2021-05-10 PROCEDURE — 36415 COLL VENOUS BLD VENIPUNCTURE: CPT | Performed by: INTERNAL MEDICINE

## 2021-05-10 PROCEDURE — 86706 HEP B SURFACE ANTIBODY: CPT | Performed by: INTERNAL MEDICINE

## 2021-05-10 PROCEDURE — 82784 ASSAY IGA/IGD/IGG/IGM EACH: CPT | Performed by: INTERNAL MEDICINE

## 2021-05-10 PROCEDURE — 80076 HEPATIC FUNCTION PANEL: CPT | Performed by: INTERNAL MEDICINE

## 2021-05-10 PROCEDURE — 86790 VIRUS ANTIBODY NOS: CPT | Performed by: INTERNAL MEDICINE

## 2021-05-10 PROCEDURE — 86803 HEPATITIS C AB TEST: CPT | Performed by: INTERNAL MEDICINE

## 2021-05-10 PROCEDURE — 83540 ASSAY OF IRON: CPT | Performed by: INTERNAL MEDICINE

## 2021-05-10 PROCEDURE — 83516 IMMUNOASSAY NONANTIBODY: CPT | Performed by: INTERNAL MEDICINE

## 2021-05-10 PROCEDURE — 83690 ASSAY OF LIPASE: CPT | Performed by: INTERNAL MEDICINE

## 2021-05-10 PROCEDURE — 85018 HEMOGLOBIN: CPT | Performed by: INTERNAL MEDICINE

## 2021-05-10 PROCEDURE — 82728 ASSAY OF FERRITIN: CPT | Performed by: INTERNAL MEDICINE

## 2021-05-10 RX ORDER — CARVEDILOL 25 MG/1
TABLET ORAL
Qty: 180 TABLET | Refills: 3 | Status: SHIPPED | OUTPATIENT
Start: 2021-05-10 | End: 2022-12-27 | Stop reason: SDUPTHER

## 2021-05-11 LAB
HBV SURFACE AB SER QL IA: NEGATIVE
HBV SURFACE AB SERPL IA-ACNC: 4 MIU/ML

## 2021-05-12 ENCOUNTER — OFFICE VISIT (OUTPATIENT)
Dept: INTERNAL MEDICINE | Facility: CLINIC | Age: 81
End: 2021-05-12
Payer: MEDICARE

## 2021-05-12 ENCOUNTER — LAB VISIT (OUTPATIENT)
Dept: LAB | Facility: HOSPITAL | Age: 81
End: 2021-05-12
Attending: INTERNAL MEDICINE
Payer: MEDICARE

## 2021-05-12 VITALS
HEART RATE: 77 BPM | SYSTOLIC BLOOD PRESSURE: 122 MMHG | DIASTOLIC BLOOD PRESSURE: 66 MMHG | HEIGHT: 68 IN | OXYGEN SATURATION: 99 % | WEIGHT: 226.44 LBS | BODY MASS INDEX: 34.32 KG/M2

## 2021-05-12 DIAGNOSIS — G47.33 OSA ON CPAP: Chronic | ICD-10-CM

## 2021-05-12 DIAGNOSIS — M47.819 FACET ARTHROPATHY: Primary | ICD-10-CM

## 2021-05-12 DIAGNOSIS — R10.11 RIGHT UPPER QUADRANT PAIN: ICD-10-CM

## 2021-05-12 DIAGNOSIS — M10.9 GOUT, ARTHRITIS: ICD-10-CM

## 2021-05-12 DIAGNOSIS — R06.02 SHORTNESS OF BREATH: ICD-10-CM

## 2021-05-12 DIAGNOSIS — Z79.4 TYPE 2 DIABETES MELLITUS WITH DIABETIC POLYNEUROPATHY, WITH LONG-TERM CURRENT USE OF INSULIN: ICD-10-CM

## 2021-05-12 DIAGNOSIS — I70.0 AORTIC ATHEROSCLEROSIS: ICD-10-CM

## 2021-05-12 DIAGNOSIS — E11.42 TYPE 2 DIABETES MELLITUS WITH DIABETIC POLYNEUROPATHY, WITH LONG-TERM CURRENT USE OF INSULIN: ICD-10-CM

## 2021-05-12 DIAGNOSIS — K21.9 GASTROESOPHAGEAL REFLUX DISEASE WITHOUT ESOPHAGITIS: ICD-10-CM

## 2021-05-12 DIAGNOSIS — E11.42 DIABETIC POLYNEUROPATHY ASSOCIATED WITH TYPE 2 DIABETES MELLITUS: ICD-10-CM

## 2021-05-12 DIAGNOSIS — I50.30 DIASTOLIC CONGESTIVE HEART FAILURE, UNSPECIFIED HF CHRONICITY: ICD-10-CM

## 2021-05-12 DIAGNOSIS — I10 ESSENTIAL HYPERTENSION: Chronic | ICD-10-CM

## 2021-05-12 LAB — TTG IGA SER-ACNC: 5 UNITS

## 2021-05-12 PROCEDURE — 3072F LOW RISK FOR RETINOPATHY: CPT | Mod: S$GLB,,, | Performed by: INTERNAL MEDICINE

## 2021-05-12 PROCEDURE — 3288F FALL RISK ASSESSMENT DOCD: CPT | Mod: CPTII,S$GLB,, | Performed by: INTERNAL MEDICINE

## 2021-05-12 PROCEDURE — 36415 COLL VENOUS BLD VENIPUNCTURE: CPT | Performed by: INTERNAL MEDICINE

## 2021-05-12 PROCEDURE — 1126F PR PAIN SEVERITY QUANTIFIED, NO PAIN PRESENT: ICD-10-PCS | Mod: S$GLB,,, | Performed by: INTERNAL MEDICINE

## 2021-05-12 PROCEDURE — 1101F PT FALLS ASSESS-DOCD LE1/YR: CPT | Mod: CPTII,S$GLB,, | Performed by: INTERNAL MEDICINE

## 2021-05-12 PROCEDURE — 1126F AMNT PAIN NOTED NONE PRSNT: CPT | Mod: S$GLB,,, | Performed by: INTERNAL MEDICINE

## 2021-05-12 PROCEDURE — 3072F PR LOW RISK FOR RETINOPATHY: ICD-10-PCS | Mod: S$GLB,,, | Performed by: INTERNAL MEDICINE

## 2021-05-12 PROCEDURE — 1159F PR MEDICATION LIST DOCUMENTED IN MEDICAL RECORD: ICD-10-PCS | Mod: S$GLB,,, | Performed by: INTERNAL MEDICINE

## 2021-05-12 PROCEDURE — 87522 HEPATITIS C REVRS TRNSCRPJ: CPT | Performed by: INTERNAL MEDICINE

## 2021-05-12 PROCEDURE — 1101F PR PT FALLS ASSESS DOC 0-1 FALLS W/OUT INJ PAST YR: ICD-10-PCS | Mod: CPTII,S$GLB,, | Performed by: INTERNAL MEDICINE

## 2021-05-12 PROCEDURE — 99999 PR PBB SHADOW E&M-EST. PATIENT-LVL III: CPT | Mod: PBBFAC,,, | Performed by: INTERNAL MEDICINE

## 2021-05-12 PROCEDURE — 99214 PR OFFICE/OUTPT VISIT, EST, LEVL IV, 30-39 MIN: ICD-10-PCS | Mod: S$GLB,,, | Performed by: INTERNAL MEDICINE

## 2021-05-12 PROCEDURE — 3288F PR FALLS RISK ASSESSMENT DOCUMENTED: ICD-10-PCS | Mod: CPTII,S$GLB,, | Performed by: INTERNAL MEDICINE

## 2021-05-12 PROCEDURE — 99999 PR PBB SHADOW E&M-EST. PATIENT-LVL III: ICD-10-PCS | Mod: PBBFAC,,, | Performed by: INTERNAL MEDICINE

## 2021-05-12 PROCEDURE — 99214 OFFICE O/P EST MOD 30 MIN: CPT | Mod: S$GLB,,, | Performed by: INTERNAL MEDICINE

## 2021-05-12 PROCEDURE — 1159F MED LIST DOCD IN RCRD: CPT | Mod: S$GLB,,, | Performed by: INTERNAL MEDICINE

## 2021-05-12 RX ORDER — POTASSIUM CHLORIDE 20 MEQ/1
20 TABLET, EXTENDED RELEASE ORAL 2 TIMES DAILY
Qty: 30 TABLET | Refills: 3 | Status: SHIPPED | OUTPATIENT
Start: 2021-05-12 | End: 2021-11-16

## 2021-05-12 RX ORDER — DULOXETIN HYDROCHLORIDE 60 MG/1
60 CAPSULE, DELAYED RELEASE ORAL 2 TIMES DAILY
Qty: 180 CAPSULE | Refills: 3 | Status: SHIPPED | OUTPATIENT
Start: 2021-05-12 | End: 2021-08-05

## 2021-05-12 RX ORDER — OXYCODONE AND ACETAMINOPHEN 10; 325 MG/1; MG/1
1 TABLET ORAL
Qty: 150 TABLET | Refills: 0 | Status: SHIPPED | OUTPATIENT
Start: 2021-06-04 | End: 2021-06-21 | Stop reason: SDUPTHER

## 2021-05-12 RX ORDER — ALBUTEROL SULFATE 90 UG/1
2 AEROSOL, METERED RESPIRATORY (INHALATION) EVERY 4 HOURS PRN
Qty: 18 G | Refills: 1 | Status: SHIPPED | OUTPATIENT
Start: 2021-05-12 | End: 2021-10-04 | Stop reason: SDUPTHER

## 2021-05-14 LAB — HEPATITIS C VIRUS (HCV) RNA DETECTION/QUANTIFICATION RT-PCR: NORMAL IU/ML

## 2021-05-18 ENCOUNTER — TELEPHONE (OUTPATIENT)
Dept: ADMINISTRATIVE | Facility: CLINIC | Age: 81
End: 2021-05-18

## 2021-05-19 ENCOUNTER — LAB VISIT (OUTPATIENT)
Dept: LAB | Facility: HOSPITAL | Age: 81
End: 2021-05-19
Attending: INTERNAL MEDICINE
Payer: MEDICARE

## 2021-05-19 ENCOUNTER — OFFICE VISIT (OUTPATIENT)
Dept: INTERNAL MEDICINE | Facility: CLINIC | Age: 81
End: 2021-05-19
Payer: MEDICARE

## 2021-05-19 VITALS
DIASTOLIC BLOOD PRESSURE: 82 MMHG | HEIGHT: 68 IN | WEIGHT: 222.88 LBS | SYSTOLIC BLOOD PRESSURE: 140 MMHG | OXYGEN SATURATION: 97 % | HEART RATE: 81 BPM | BODY MASS INDEX: 33.78 KG/M2

## 2021-05-19 DIAGNOSIS — I77.819 ECTATIC AORTA: ICD-10-CM

## 2021-05-19 DIAGNOSIS — N18.31 TYPE 2 DIABETES MELLITUS WITH STAGE 3A CHRONIC KIDNEY DISEASE, WITH LONG-TERM CURRENT USE OF INSULIN: ICD-10-CM

## 2021-05-19 DIAGNOSIS — E11.42 TYPE 2 DIABETES MELLITUS WITH DIABETIC POLYNEUROPATHY, WITH LONG-TERM CURRENT USE OF INSULIN: ICD-10-CM

## 2021-05-19 DIAGNOSIS — E11.65 TYPE 2 DIABETES MELLITUS WITH HYPERGLYCEMIA, WITH LONG-TERM CURRENT USE OF INSULIN: ICD-10-CM

## 2021-05-19 DIAGNOSIS — Z79.4 TYPE 2 DIABETES MELLITUS WITH DIABETIC POLYNEUROPATHY, WITH LONG-TERM CURRENT USE OF INSULIN: ICD-10-CM

## 2021-05-19 DIAGNOSIS — M46.06 SPINAL ENTHESOPATHY OF LUMBAR REGION: ICD-10-CM

## 2021-05-19 DIAGNOSIS — E78.2 MIXED HYPERLIPIDEMIA: ICD-10-CM

## 2021-05-19 DIAGNOSIS — I77.1 TORTUOUS AORTA: ICD-10-CM

## 2021-05-19 DIAGNOSIS — I70.0 AORTIC ATHEROSCLEROSIS: ICD-10-CM

## 2021-05-19 DIAGNOSIS — Z79.4 INSULIN LONG-TERM USE: ICD-10-CM

## 2021-05-19 DIAGNOSIS — E11.69 OBESITY, DIABETES, AND HYPERTENSION SYNDROME: ICD-10-CM

## 2021-05-19 DIAGNOSIS — Z00.00 ENCOUNTER FOR PREVENTIVE HEALTH EXAMINATION: ICD-10-CM

## 2021-05-19 DIAGNOSIS — E78.00 HYPERCHOLESTEROLEMIA: ICD-10-CM

## 2021-05-19 DIAGNOSIS — I15.2 OBESITY, DIABETES, AND HYPERTENSION SYNDROME: ICD-10-CM

## 2021-05-19 DIAGNOSIS — I25.119 CORONARY ARTERY DISEASE INVOLVING NATIVE CORONARY ARTERY OF NATIVE HEART WITH ANGINA PECTORIS: ICD-10-CM

## 2021-05-19 DIAGNOSIS — F39 MOOD DISORDER: ICD-10-CM

## 2021-05-19 DIAGNOSIS — E66.9 OBESITY (BMI 30.0-34.9): ICD-10-CM

## 2021-05-19 DIAGNOSIS — H90.3 SENSORINEURAL HEARING LOSS (SNHL) OF BOTH EARS: ICD-10-CM

## 2021-05-19 DIAGNOSIS — E21.3 HYPERPARATHYROIDISM: ICD-10-CM

## 2021-05-19 DIAGNOSIS — N18.30 STAGE 3 CHRONIC KIDNEY DISEASE, UNSPECIFIED WHETHER STAGE 3A OR 3B CKD: ICD-10-CM

## 2021-05-19 DIAGNOSIS — Z79.4 TYPE 2 DIABETES MELLITUS WITH HYPERGLYCEMIA, WITH LONG-TERM CURRENT USE OF INSULIN: ICD-10-CM

## 2021-05-19 DIAGNOSIS — E11.42 DIABETIC POLYNEUROPATHY ASSOCIATED WITH TYPE 2 DIABETES MELLITUS: Primary | ICD-10-CM

## 2021-05-19 DIAGNOSIS — E11.22 TYPE 2 DIABETES MELLITUS WITH STAGE 3A CHRONIC KIDNEY DISEASE, WITH LONG-TERM CURRENT USE OF INSULIN: ICD-10-CM

## 2021-05-19 DIAGNOSIS — M48.062 SPINAL STENOSIS OF LUMBAR REGION WITH NEUROGENIC CLAUDICATION: ICD-10-CM

## 2021-05-19 DIAGNOSIS — E11.59 OBESITY, DIABETES, AND HYPERTENSION SYNDROME: ICD-10-CM

## 2021-05-19 DIAGNOSIS — Z79.4 TYPE 2 DIABETES MELLITUS WITH STAGE 3A CHRONIC KIDNEY DISEASE, WITH LONG-TERM CURRENT USE OF INSULIN: ICD-10-CM

## 2021-05-19 DIAGNOSIS — Z01.00 DIABETIC EYE EXAM: ICD-10-CM

## 2021-05-19 DIAGNOSIS — E66.9 OBESITY, DIABETES, AND HYPERTENSION SYNDROME: ICD-10-CM

## 2021-05-19 DIAGNOSIS — I73.9 PAD (PERIPHERAL ARTERY DISEASE): ICD-10-CM

## 2021-05-19 DIAGNOSIS — E11.9 DIABETIC EYE EXAM: ICD-10-CM

## 2021-05-19 DIAGNOSIS — I50.30 DIASTOLIC CONGESTIVE HEART FAILURE, UNSPECIFIED HF CHRONICITY: ICD-10-CM

## 2021-05-19 LAB
CHOLEST SERPL-MCNC: 169 MG/DL (ref 120–199)
CHOLEST/HDLC SERPL: 3.8 {RATIO} (ref 2–5)
HDLC SERPL-MCNC: 45 MG/DL (ref 40–75)
HDLC SERPL: 26.6 % (ref 20–50)
LDLC SERPL CALC-MCNC: 107.6 MG/DL (ref 63–159)
NONHDLC SERPL-MCNC: 124 MG/DL
TRIGL SERPL-MCNC: 82 MG/DL (ref 30–150)

## 2021-05-19 PROCEDURE — 99999 PR PBB SHADOW E&M-EST. PATIENT-LVL V: CPT | Mod: PBBFAC,,, | Performed by: NURSE PRACTITIONER

## 2021-05-19 PROCEDURE — 1158F PR ADVANCE CARE PLANNING DISCUSS DOCUMENTED IN MEDICAL RECORD: ICD-10-PCS | Mod: S$GLB,,, | Performed by: NURSE PRACTITIONER

## 2021-05-19 PROCEDURE — 36415 COLL VENOUS BLD VENIPUNCTURE: CPT | Performed by: NURSE PRACTITIONER

## 2021-05-19 PROCEDURE — G0439 PR MEDICARE ANNUAL WELLNESS SUBSEQUENT VISIT: ICD-10-PCS | Mod: S$GLB,,, | Performed by: NURSE PRACTITIONER

## 2021-05-19 PROCEDURE — 3072F PR LOW RISK FOR RETINOPATHY: ICD-10-PCS | Mod: S$GLB,,, | Performed by: NURSE PRACTITIONER

## 2021-05-19 PROCEDURE — 3072F LOW RISK FOR RETINOPATHY: CPT | Mod: S$GLB,,, | Performed by: NURSE PRACTITIONER

## 2021-05-19 PROCEDURE — 80061 LIPID PANEL: CPT | Performed by: NURSE PRACTITIONER

## 2021-05-19 PROCEDURE — G9919 SCRN ND POS ND PROV OF REC: HCPCS | Mod: CPTII,S$GLB,, | Performed by: NURSE PRACTITIONER

## 2021-05-19 PROCEDURE — 1101F PT FALLS ASSESS-DOCD LE1/YR: CPT | Mod: CPTII,S$GLB,, | Performed by: NURSE PRACTITIONER

## 2021-05-19 PROCEDURE — 3288F FALL RISK ASSESSMENT DOCD: CPT | Mod: CPTII,S$GLB,, | Performed by: NURSE PRACTITIONER

## 2021-05-19 PROCEDURE — 1158F ADVNC CARE PLAN TLK DOCD: CPT | Mod: S$GLB,,, | Performed by: NURSE PRACTITIONER

## 2021-05-19 PROCEDURE — 1101F PR PT FALLS ASSESS DOC 0-1 FALLS W/OUT INJ PAST YR: ICD-10-PCS | Mod: CPTII,S$GLB,, | Performed by: NURSE PRACTITIONER

## 2021-05-19 PROCEDURE — 3288F PR FALLS RISK ASSESSMENT DOCUMENTED: ICD-10-PCS | Mod: CPTII,S$GLB,, | Performed by: NURSE PRACTITIONER

## 2021-05-19 PROCEDURE — G0439 PPPS, SUBSEQ VISIT: HCPCS | Mod: S$GLB,,, | Performed by: NURSE PRACTITIONER

## 2021-05-19 PROCEDURE — G9919 PR SCREENING AND POSITIVE: ICD-10-PCS | Mod: CPTII,S$GLB,, | Performed by: NURSE PRACTITIONER

## 2021-05-19 PROCEDURE — 99999 PR PBB SHADOW E&M-EST. PATIENT-LVL V: ICD-10-PCS | Mod: PBBFAC,,, | Performed by: NURSE PRACTITIONER

## 2021-05-19 PROCEDURE — 1125F PR PAIN SEVERITY QUANTIFIED, PAIN PRESENT: ICD-10-PCS | Mod: S$GLB,,, | Performed by: NURSE PRACTITIONER

## 2021-05-19 PROCEDURE — 1125F AMNT PAIN NOTED PAIN PRSNT: CPT | Mod: S$GLB,,, | Performed by: NURSE PRACTITIONER

## 2021-05-20 ENCOUNTER — TELEPHONE (OUTPATIENT)
Dept: PAIN MEDICINE | Facility: CLINIC | Age: 81
End: 2021-05-20

## 2021-05-21 ENCOUNTER — TELEPHONE (OUTPATIENT)
Dept: GASTROENTEROLOGY | Facility: CLINIC | Age: 81
End: 2021-05-21

## 2021-05-26 ENCOUNTER — CLINICAL SUPPORT (OUTPATIENT)
Dept: OPTOMETRY | Facility: CLINIC | Age: 81
End: 2021-05-26
Attending: NURSE PRACTITIONER
Payer: MEDICARE

## 2021-05-26 DIAGNOSIS — Z01.00 DIABETIC EYE EXAM: ICD-10-CM

## 2021-05-26 DIAGNOSIS — E11.9 DIABETIC EYE EXAM: ICD-10-CM

## 2021-05-26 PROCEDURE — 92228 IMG RTA DETC/MNTR DS PHY/QHP: CPT | Mod: TC,S$GLB,, | Performed by: INTERNAL MEDICINE

## 2021-05-26 PROCEDURE — 92228 DIABETIC EYE SCREENING PHOTO: ICD-10-PCS | Mod: 26,S$GLB,, | Performed by: OPHTHALMOLOGY

## 2021-05-26 PROCEDURE — 92228 IMG RTA DETC/MNTR DS PHY/QHP: CPT | Mod: 26,S$GLB,, | Performed by: OPHTHALMOLOGY

## 2021-05-26 PROCEDURE — 92228 DIABETIC EYE SCREENING PHOTO: ICD-10-PCS | Mod: TC,S$GLB,, | Performed by: INTERNAL MEDICINE

## 2021-05-28 DIAGNOSIS — R10.11 RIGHT UPPER QUADRANT PAIN: Primary | ICD-10-CM

## 2021-06-04 ENCOUNTER — TELEPHONE (OUTPATIENT)
Dept: OPHTHALMOLOGY | Facility: CLINIC | Age: 81
End: 2021-06-04

## 2021-06-17 ENCOUNTER — TELEPHONE (OUTPATIENT)
Dept: INTERNAL MEDICINE | Facility: CLINIC | Age: 81
End: 2021-06-17

## 2021-06-17 ENCOUNTER — OFFICE VISIT (OUTPATIENT)
Dept: INTERNAL MEDICINE | Facility: CLINIC | Age: 81
End: 2021-06-17
Payer: MEDICARE

## 2021-06-17 ENCOUNTER — HOSPITAL ENCOUNTER (OUTPATIENT)
Dept: RADIOLOGY | Facility: HOSPITAL | Age: 81
Discharge: HOME | End: 2021-06-17
Attending: INTERNAL MEDICINE
Payer: MEDICARE

## 2021-06-17 VITALS
OXYGEN SATURATION: 99 % | DIASTOLIC BLOOD PRESSURE: 72 MMHG | TEMPERATURE: 98 F | HEART RATE: 75 BPM | SYSTOLIC BLOOD PRESSURE: 136 MMHG | WEIGHT: 222.25 LBS | HEIGHT: 68 IN | BODY MASS INDEX: 33.68 KG/M2

## 2021-06-17 DIAGNOSIS — M79.10 MYALGIA: ICD-10-CM

## 2021-06-17 DIAGNOSIS — M79.10 MYALGIA: Primary | ICD-10-CM

## 2021-06-17 PROCEDURE — 3288F PR FALLS RISK ASSESSMENT DOCUMENTED: ICD-10-PCS | Mod: CPTII,S$GLB,, | Performed by: INTERNAL MEDICINE

## 2021-06-17 PROCEDURE — 71046 X-RAY EXAM CHEST 2 VIEWS: CPT | Mod: 26,,, | Performed by: RADIOLOGY

## 2021-06-17 PROCEDURE — 1125F AMNT PAIN NOTED PAIN PRSNT: CPT | Mod: S$GLB,,, | Performed by: INTERNAL MEDICINE

## 2021-06-17 PROCEDURE — 3072F PR LOW RISK FOR RETINOPATHY: ICD-10-PCS | Mod: S$GLB,,, | Performed by: INTERNAL MEDICINE

## 2021-06-17 PROCEDURE — 3288F FALL RISK ASSESSMENT DOCD: CPT | Mod: CPTII,S$GLB,, | Performed by: INTERNAL MEDICINE

## 2021-06-17 PROCEDURE — 1125F PR PAIN SEVERITY QUANTIFIED, PAIN PRESENT: ICD-10-PCS | Mod: S$GLB,,, | Performed by: INTERNAL MEDICINE

## 2021-06-17 PROCEDURE — 1159F PR MEDICATION LIST DOCUMENTED IN MEDICAL RECORD: ICD-10-PCS | Mod: S$GLB,,, | Performed by: INTERNAL MEDICINE

## 2021-06-17 PROCEDURE — 99213 PR OFFICE/OUTPT VISIT, EST, LEVL III, 20-29 MIN: ICD-10-PCS | Mod: S$GLB,,, | Performed by: INTERNAL MEDICINE

## 2021-06-17 PROCEDURE — 99999 PR PBB SHADOW E&M-EST. PATIENT-LVL V: CPT | Mod: PBBFAC,,, | Performed by: INTERNAL MEDICINE

## 2021-06-17 PROCEDURE — 1101F PT FALLS ASSESS-DOCD LE1/YR: CPT | Mod: CPTII,S$GLB,, | Performed by: INTERNAL MEDICINE

## 2021-06-17 PROCEDURE — 3072F LOW RISK FOR RETINOPATHY: CPT | Mod: S$GLB,,, | Performed by: INTERNAL MEDICINE

## 2021-06-17 PROCEDURE — 1159F MED LIST DOCD IN RCRD: CPT | Mod: S$GLB,,, | Performed by: INTERNAL MEDICINE

## 2021-06-17 PROCEDURE — 1101F PR PT FALLS ASSESS DOC 0-1 FALLS W/OUT INJ PAST YR: ICD-10-PCS | Mod: CPTII,S$GLB,, | Performed by: INTERNAL MEDICINE

## 2021-06-17 PROCEDURE — 71046 XR CHEST PA AND LATERAL: ICD-10-PCS | Mod: 26,,, | Performed by: RADIOLOGY

## 2021-06-17 PROCEDURE — 99999 PR PBB SHADOW E&M-EST. PATIENT-LVL V: ICD-10-PCS | Mod: PBBFAC,,, | Performed by: INTERNAL MEDICINE

## 2021-06-17 PROCEDURE — 99213 OFFICE O/P EST LOW 20 MIN: CPT | Mod: S$GLB,,, | Performed by: INTERNAL MEDICINE

## 2021-06-17 PROCEDURE — 71046 X-RAY EXAM CHEST 2 VIEWS: CPT | Mod: TC

## 2021-06-17 RX ORDER — MELOXICAM 15 MG/1
15 TABLET ORAL DAILY
Qty: 30 TABLET | Refills: 1 | Status: SHIPPED | OUTPATIENT
Start: 2021-06-17 | End: 2021-06-21

## 2021-06-21 ENCOUNTER — HOSPITAL ENCOUNTER (OUTPATIENT)
Dept: RADIOLOGY | Facility: HOSPITAL | Age: 81
Discharge: HOME | End: 2021-06-21
Attending: OBSTETRICS & GYNECOLOGY
Payer: MEDICARE

## 2021-06-21 ENCOUNTER — OFFICE VISIT (OUTPATIENT)
Dept: INTERNAL MEDICINE | Facility: CLINIC | Age: 81
End: 2021-06-21
Payer: MEDICARE

## 2021-06-21 VITALS
WEIGHT: 223.13 LBS | SYSTOLIC BLOOD PRESSURE: 110 MMHG | HEIGHT: 68 IN | BODY MASS INDEX: 33.82 KG/M2 | HEART RATE: 83 BPM | DIASTOLIC BLOOD PRESSURE: 60 MMHG | OXYGEN SATURATION: 98 %

## 2021-06-21 DIAGNOSIS — I70.0 AORTIC ATHEROSCLEROSIS: Primary | ICD-10-CM

## 2021-06-21 DIAGNOSIS — Z12.31 SCREENING MAMMOGRAM, ENCOUNTER FOR: ICD-10-CM

## 2021-06-21 DIAGNOSIS — E78.2 MIXED HYPERLIPIDEMIA: ICD-10-CM

## 2021-06-21 DIAGNOSIS — I10 ESSENTIAL HYPERTENSION: Chronic | ICD-10-CM

## 2021-06-21 DIAGNOSIS — Z79.4 TYPE 2 DIABETES MELLITUS WITH DIABETIC POLYNEUROPATHY, WITH LONG-TERM CURRENT USE OF INSULIN: ICD-10-CM

## 2021-06-21 DIAGNOSIS — E11.42 TYPE 2 DIABETES MELLITUS WITH DIABETIC POLYNEUROPATHY, WITH LONG-TERM CURRENT USE OF INSULIN: ICD-10-CM

## 2021-06-21 DIAGNOSIS — F39 MOOD DISORDER: ICD-10-CM

## 2021-06-21 DIAGNOSIS — M10.9 GOUT, ARTHRITIS: ICD-10-CM

## 2021-06-21 DIAGNOSIS — K21.9 GASTROESOPHAGEAL REFLUX DISEASE WITHOUT ESOPHAGITIS: ICD-10-CM

## 2021-06-21 PROCEDURE — 3288F PR FALLS RISK ASSESSMENT DOCUMENTED: ICD-10-PCS | Mod: CPTII,S$GLB,, | Performed by: INTERNAL MEDICINE

## 2021-06-21 PROCEDURE — 1125F PR PAIN SEVERITY QUANTIFIED, PAIN PRESENT: ICD-10-PCS | Mod: S$GLB,,, | Performed by: INTERNAL MEDICINE

## 2021-06-21 PROCEDURE — 3072F PR LOW RISK FOR RETINOPATHY: ICD-10-PCS | Mod: S$GLB,,, | Performed by: INTERNAL MEDICINE

## 2021-06-21 PROCEDURE — 77063 MAMMO DIGITAL SCREENING BILAT WITH TOMO: ICD-10-PCS | Mod: 26,,, | Performed by: RADIOLOGY

## 2021-06-21 PROCEDURE — 99999 PR PBB SHADOW E&M-EST. PATIENT-LVL III: CPT | Mod: PBBFAC,,, | Performed by: INTERNAL MEDICINE

## 2021-06-21 PROCEDURE — 1101F PR PT FALLS ASSESS DOC 0-1 FALLS W/OUT INJ PAST YR: ICD-10-PCS | Mod: CPTII,S$GLB,, | Performed by: INTERNAL MEDICINE

## 2021-06-21 PROCEDURE — 77067 SCR MAMMO BI INCL CAD: CPT | Mod: 26,,, | Performed by: RADIOLOGY

## 2021-06-21 PROCEDURE — 1125F AMNT PAIN NOTED PAIN PRSNT: CPT | Mod: S$GLB,,, | Performed by: INTERNAL MEDICINE

## 2021-06-21 PROCEDURE — 99999 PR PBB SHADOW E&M-EST. PATIENT-LVL III: ICD-10-PCS | Mod: PBBFAC,,, | Performed by: INTERNAL MEDICINE

## 2021-06-21 PROCEDURE — 77067 MAMMO DIGITAL SCREENING BILAT WITH TOMO: ICD-10-PCS | Mod: 26,,, | Performed by: RADIOLOGY

## 2021-06-21 PROCEDURE — 77063 BREAST TOMOSYNTHESIS BI: CPT | Mod: 26,,, | Performed by: RADIOLOGY

## 2021-06-21 PROCEDURE — 1159F MED LIST DOCD IN RCRD: CPT | Mod: S$GLB,,, | Performed by: INTERNAL MEDICINE

## 2021-06-21 PROCEDURE — 99214 PR OFFICE/OUTPT VISIT, EST, LEVL IV, 30-39 MIN: ICD-10-PCS | Mod: S$GLB,,, | Performed by: INTERNAL MEDICINE

## 2021-06-21 PROCEDURE — 77067 SCR MAMMO BI INCL CAD: CPT | Mod: TC

## 2021-06-21 PROCEDURE — 3072F LOW RISK FOR RETINOPATHY: CPT | Mod: S$GLB,,, | Performed by: INTERNAL MEDICINE

## 2021-06-21 PROCEDURE — 3288F FALL RISK ASSESSMENT DOCD: CPT | Mod: CPTII,S$GLB,, | Performed by: INTERNAL MEDICINE

## 2021-06-21 PROCEDURE — 99214 OFFICE O/P EST MOD 30 MIN: CPT | Mod: S$GLB,,, | Performed by: INTERNAL MEDICINE

## 2021-06-21 PROCEDURE — 1159F PR MEDICATION LIST DOCUMENTED IN MEDICAL RECORD: ICD-10-PCS | Mod: S$GLB,,, | Performed by: INTERNAL MEDICINE

## 2021-06-21 PROCEDURE — 1101F PT FALLS ASSESS-DOCD LE1/YR: CPT | Mod: CPTII,S$GLB,, | Performed by: INTERNAL MEDICINE

## 2021-06-21 RX ORDER — PREDNISONE 10 MG/1
TABLET ORAL
Qty: 9 TABLET | Refills: 0 | Status: SHIPPED | OUTPATIENT
Start: 2021-06-21 | End: 2021-07-06 | Stop reason: ALTCHOICE

## 2021-06-21 RX ORDER — OXYCODONE AND ACETAMINOPHEN 10; 325 MG/1; MG/1
1 TABLET ORAL
Qty: 150 TABLET | Refills: 0 | Status: SHIPPED | OUTPATIENT
Start: 2021-06-21 | End: 2021-07-28 | Stop reason: SDUPTHER

## 2021-06-30 RX ORDER — ROPINIROLE 0.5 MG/1
TABLET, FILM COATED ORAL
Qty: 90 TABLET | Refills: 1 | Status: SHIPPED | OUTPATIENT
Start: 2021-06-30 | End: 2021-11-11

## 2021-07-06 ENCOUNTER — OFFICE VISIT (OUTPATIENT)
Dept: CARDIOLOGY | Facility: CLINIC | Age: 81
End: 2021-07-06
Payer: MEDICARE

## 2021-07-06 VITALS
SYSTOLIC BLOOD PRESSURE: 112 MMHG | BODY MASS INDEX: 33.48 KG/M2 | OXYGEN SATURATION: 99 % | HEART RATE: 78 BPM | HEIGHT: 68 IN | DIASTOLIC BLOOD PRESSURE: 78 MMHG | WEIGHT: 220.88 LBS

## 2021-07-06 DIAGNOSIS — I10 ESSENTIAL HYPERTENSION: Chronic | ICD-10-CM

## 2021-07-06 DIAGNOSIS — E78.00 HYPERCHOLESTEROLEMIA: ICD-10-CM

## 2021-07-06 DIAGNOSIS — E78.2 MIXED HYPERLIPIDEMIA: ICD-10-CM

## 2021-07-06 DIAGNOSIS — I87.2 VENOUS INSUFFICIENCY OF BOTH LOWER EXTREMITIES: ICD-10-CM

## 2021-07-06 DIAGNOSIS — N18.30 STAGE 3 CHRONIC KIDNEY DISEASE, UNSPECIFIED WHETHER STAGE 3A OR 3B CKD: ICD-10-CM

## 2021-07-06 DIAGNOSIS — E66.9 OBESITY, DIABETES, AND HYPERTENSION SYNDROME: ICD-10-CM

## 2021-07-06 DIAGNOSIS — M79.2 NEUROPATHIC PAIN: ICD-10-CM

## 2021-07-06 DIAGNOSIS — Z79.4 TYPE 2 DIABETES MELLITUS WITH STAGE 3A CHRONIC KIDNEY DISEASE, WITH LONG-TERM CURRENT USE OF INSULIN: ICD-10-CM

## 2021-07-06 DIAGNOSIS — E11.41 DIABETIC MONONEUROPATHY ASSOCIATED WITH TYPE 2 DIABETES MELLITUS: ICD-10-CM

## 2021-07-06 DIAGNOSIS — R60.0 BILATERAL LEG EDEMA: Primary | ICD-10-CM

## 2021-07-06 DIAGNOSIS — I73.9 PAD (PERIPHERAL ARTERY DISEASE): ICD-10-CM

## 2021-07-06 DIAGNOSIS — N18.31 TYPE 2 DIABETES MELLITUS WITH STAGE 3A CHRONIC KIDNEY DISEASE, WITH LONG-TERM CURRENT USE OF INSULIN: ICD-10-CM

## 2021-07-06 DIAGNOSIS — E11.59 OBESITY, DIABETES, AND HYPERTENSION SYNDROME: ICD-10-CM

## 2021-07-06 DIAGNOSIS — E11.69 OBESITY, DIABETES, AND HYPERTENSION SYNDROME: ICD-10-CM

## 2021-07-06 DIAGNOSIS — E11.22 TYPE 2 DIABETES MELLITUS WITH STAGE 3A CHRONIC KIDNEY DISEASE, WITH LONG-TERM CURRENT USE OF INSULIN: ICD-10-CM

## 2021-07-06 DIAGNOSIS — I15.2 OBESITY, DIABETES, AND HYPERTENSION SYNDROME: ICD-10-CM

## 2021-07-06 PROCEDURE — 1101F PT FALLS ASSESS-DOCD LE1/YR: CPT | Mod: CPTII,S$GLB,, | Performed by: INTERNAL MEDICINE

## 2021-07-06 PROCEDURE — 99999 PR PBB SHADOW E&M-EST. PATIENT-LVL V: CPT | Mod: PBBFAC,,, | Performed by: INTERNAL MEDICINE

## 2021-07-06 PROCEDURE — 3072F PR LOW RISK FOR RETINOPATHY: ICD-10-PCS | Mod: S$GLB,,, | Performed by: INTERNAL MEDICINE

## 2021-07-06 PROCEDURE — 99215 PR OFFICE/OUTPT VISIT, EST, LEVL V, 40-54 MIN: ICD-10-PCS | Mod: S$GLB,,, | Performed by: INTERNAL MEDICINE

## 2021-07-06 PROCEDURE — 3288F PR FALLS RISK ASSESSMENT DOCUMENTED: ICD-10-PCS | Mod: CPTII,S$GLB,, | Performed by: INTERNAL MEDICINE

## 2021-07-06 PROCEDURE — 99999 PR PBB SHADOW E&M-EST. PATIENT-LVL V: ICD-10-PCS | Mod: PBBFAC,,, | Performed by: INTERNAL MEDICINE

## 2021-07-06 PROCEDURE — 99215 OFFICE O/P EST HI 40 MIN: CPT | Mod: S$GLB,,, | Performed by: INTERNAL MEDICINE

## 2021-07-06 PROCEDURE — 3072F LOW RISK FOR RETINOPATHY: CPT | Mod: S$GLB,,, | Performed by: INTERNAL MEDICINE

## 2021-07-06 PROCEDURE — 1159F MED LIST DOCD IN RCRD: CPT | Mod: S$GLB,,, | Performed by: INTERNAL MEDICINE

## 2021-07-06 PROCEDURE — 1159F PR MEDICATION LIST DOCUMENTED IN MEDICAL RECORD: ICD-10-PCS | Mod: S$GLB,,, | Performed by: INTERNAL MEDICINE

## 2021-07-06 PROCEDURE — 3288F FALL RISK ASSESSMENT DOCD: CPT | Mod: CPTII,S$GLB,, | Performed by: INTERNAL MEDICINE

## 2021-07-06 PROCEDURE — 1125F PR PAIN SEVERITY QUANTIFIED, PAIN PRESENT: ICD-10-PCS | Mod: S$GLB,,, | Performed by: INTERNAL MEDICINE

## 2021-07-06 PROCEDURE — 1101F PR PT FALLS ASSESS DOC 0-1 FALLS W/OUT INJ PAST YR: ICD-10-PCS | Mod: CPTII,S$GLB,, | Performed by: INTERNAL MEDICINE

## 2021-07-06 PROCEDURE — 1125F AMNT PAIN NOTED PAIN PRSNT: CPT | Mod: S$GLB,,, | Performed by: INTERNAL MEDICINE

## 2021-07-13 ENCOUNTER — TELEPHONE (OUTPATIENT)
Dept: GASTROENTEROLOGY | Facility: CLINIC | Age: 81
End: 2021-07-13

## 2021-07-14 ENCOUNTER — TELEPHONE (OUTPATIENT)
Dept: GASTROENTEROLOGY | Facility: CLINIC | Age: 81
End: 2021-07-14

## 2021-07-28 ENCOUNTER — OFFICE VISIT (OUTPATIENT)
Dept: INTERNAL MEDICINE | Facility: CLINIC | Age: 81
End: 2021-07-28
Payer: MEDICARE

## 2021-07-28 ENCOUNTER — LAB VISIT (OUTPATIENT)
Dept: LAB | Facility: HOSPITAL | Age: 81
End: 2021-07-28
Attending: INTERNAL MEDICINE
Payer: MEDICARE

## 2021-07-28 VITALS
HEART RATE: 81 BPM | DIASTOLIC BLOOD PRESSURE: 64 MMHG | SYSTOLIC BLOOD PRESSURE: 116 MMHG | OXYGEN SATURATION: 97 % | WEIGHT: 224 LBS | HEIGHT: 68 IN | BODY MASS INDEX: 33.95 KG/M2

## 2021-07-28 DIAGNOSIS — E11.42 TYPE 2 DIABETES MELLITUS WITH DIABETIC POLYNEUROPATHY, WITH LONG-TERM CURRENT USE OF INSULIN: ICD-10-CM

## 2021-07-28 DIAGNOSIS — Z79.4 TYPE 2 DIABETES MELLITUS WITH DIABETIC POLYNEUROPATHY, WITH LONG-TERM CURRENT USE OF INSULIN: ICD-10-CM

## 2021-07-28 DIAGNOSIS — K21.9 GASTROESOPHAGEAL REFLUX DISEASE WITHOUT ESOPHAGITIS: ICD-10-CM

## 2021-07-28 DIAGNOSIS — M10.9 GOUT, ARTHRITIS: Primary | ICD-10-CM

## 2021-07-28 DIAGNOSIS — I10 ESSENTIAL HYPERTENSION: ICD-10-CM

## 2021-07-28 DIAGNOSIS — I50.30 DIASTOLIC CONGESTIVE HEART FAILURE, UNSPECIFIED HF CHRONICITY: ICD-10-CM

## 2021-07-28 DIAGNOSIS — E66.9 OBESITY (BMI 30.0-34.9): ICD-10-CM

## 2021-07-28 DIAGNOSIS — F39 MOOD DISORDER: ICD-10-CM

## 2021-07-28 DIAGNOSIS — M10.9 GOUT, ARTHRITIS: ICD-10-CM

## 2021-07-28 LAB
BASOPHILS # BLD AUTO: 0.08 K/UL (ref 0–0.2)
BASOPHILS NFR BLD: 0.6 % (ref 0–1.9)
DIFFERENTIAL METHOD: ABNORMAL
EOSINOPHIL # BLD AUTO: 0.3 K/UL (ref 0–0.5)
EOSINOPHIL NFR BLD: 2 % (ref 0–8)
ERYTHROCYTE [DISTWIDTH] IN BLOOD BY AUTOMATED COUNT: 14.6 % (ref 11.5–14.5)
ERYTHROCYTE [SEDIMENTATION RATE] IN BLOOD BY WESTERGREN METHOD: 69 MM/HR (ref 0–36)
ESTIMATED AVG GLUCOSE: 206 MG/DL (ref 68–131)
HBA1C MFR BLD: 8.8 % (ref 4–5.6)
HCT VFR BLD AUTO: 38.4 % (ref 37–48.5)
HGB BLD-MCNC: 11.8 G/DL (ref 12–16)
IMM GRANULOCYTES # BLD AUTO: 0.11 K/UL (ref 0–0.04)
IMM GRANULOCYTES NFR BLD AUTO: 0.8 % (ref 0–0.5)
LYMPHOCYTES # BLD AUTO: 3.3 K/UL (ref 1–4.8)
LYMPHOCYTES NFR BLD: 23.7 % (ref 18–48)
MCH RBC QN AUTO: 28 PG (ref 27–31)
MCHC RBC AUTO-ENTMCNC: 30.7 G/DL (ref 32–36)
MCV RBC AUTO: 91 FL (ref 82–98)
MONOCYTES # BLD AUTO: 1.3 K/UL (ref 0.3–1)
MONOCYTES NFR BLD: 9.4 % (ref 4–15)
NEUTROPHILS # BLD AUTO: 8.7 K/UL (ref 1.8–7.7)
NEUTROPHILS NFR BLD: 63.5 % (ref 38–73)
NRBC BLD-RTO: 0 /100 WBC
PLATELET # BLD AUTO: 291 K/UL (ref 150–450)
PMV BLD AUTO: 10.6 FL (ref 9.2–12.9)
RBC # BLD AUTO: 4.21 M/UL (ref 4–5.4)
WBC # BLD AUTO: 13.75 K/UL (ref 3.9–12.7)

## 2021-07-28 PROCEDURE — 1125F PR PAIN SEVERITY QUANTIFIED, PAIN PRESENT: ICD-10-PCS | Mod: CPTII,S$GLB,, | Performed by: INTERNAL MEDICINE

## 2021-07-28 PROCEDURE — 3288F PR FALLS RISK ASSESSMENT DOCUMENTED: ICD-10-PCS | Mod: CPTII,S$GLB,, | Performed by: INTERNAL MEDICINE

## 2021-07-28 PROCEDURE — 99214 PR OFFICE/OUTPT VISIT, EST, LEVL IV, 30-39 MIN: ICD-10-PCS | Mod: S$GLB,,, | Performed by: INTERNAL MEDICINE

## 2021-07-28 PROCEDURE — 85025 COMPLETE CBC W/AUTO DIFF WBC: CPT | Performed by: INTERNAL MEDICINE

## 2021-07-28 PROCEDURE — 3074F SYST BP LT 130 MM HG: CPT | Mod: CPTII,S$GLB,, | Performed by: INTERNAL MEDICINE

## 2021-07-28 PROCEDURE — 1159F PR MEDICATION LIST DOCUMENTED IN MEDICAL RECORD: ICD-10-PCS | Mod: CPTII,S$GLB,, | Performed by: INTERNAL MEDICINE

## 2021-07-28 PROCEDURE — 84443 ASSAY THYROID STIM HORMONE: CPT | Performed by: INTERNAL MEDICINE

## 2021-07-28 PROCEDURE — 36415 COLL VENOUS BLD VENIPUNCTURE: CPT | Performed by: INTERNAL MEDICINE

## 2021-07-28 PROCEDURE — 1101F PT FALLS ASSESS-DOCD LE1/YR: CPT | Mod: CPTII,S$GLB,, | Performed by: INTERNAL MEDICINE

## 2021-07-28 PROCEDURE — 84550 ASSAY OF BLOOD/URIC ACID: CPT | Performed by: INTERNAL MEDICINE

## 2021-07-28 PROCEDURE — 1160F RVW MEDS BY RX/DR IN RCRD: CPT | Mod: CPTII,S$GLB,, | Performed by: INTERNAL MEDICINE

## 2021-07-28 PROCEDURE — 3078F DIAST BP <80 MM HG: CPT | Mod: CPTII,S$GLB,, | Performed by: INTERNAL MEDICINE

## 2021-07-28 PROCEDURE — 99499 UNLISTED E&M SERVICE: CPT | Mod: S$PBB,HCNC,, | Performed by: INTERNAL MEDICINE

## 2021-07-28 PROCEDURE — 99214 OFFICE O/P EST MOD 30 MIN: CPT | Mod: S$GLB,,, | Performed by: INTERNAL MEDICINE

## 2021-07-28 PROCEDURE — 3072F PR LOW RISK FOR RETINOPATHY: ICD-10-PCS | Mod: CPTII,S$GLB,, | Performed by: INTERNAL MEDICINE

## 2021-07-28 PROCEDURE — 3072F LOW RISK FOR RETINOPATHY: CPT | Mod: CPTII,S$GLB,, | Performed by: INTERNAL MEDICINE

## 2021-07-28 PROCEDURE — 99999 PR PBB SHADOW E&M-EST. PATIENT-LVL III: ICD-10-PCS | Mod: PBBFAC,,, | Performed by: INTERNAL MEDICINE

## 2021-07-28 PROCEDURE — 80053 COMPREHEN METABOLIC PANEL: CPT | Performed by: INTERNAL MEDICINE

## 2021-07-28 PROCEDURE — 1160F PR REVIEW ALL MEDS BY PRESCRIBER/CLIN PHARMACIST DOCUMENTED: ICD-10-PCS | Mod: CPTII,S$GLB,, | Performed by: INTERNAL MEDICINE

## 2021-07-28 PROCEDURE — 1101F PR PT FALLS ASSESS DOC 0-1 FALLS W/OUT INJ PAST YR: ICD-10-PCS | Mod: CPTII,S$GLB,, | Performed by: INTERNAL MEDICINE

## 2021-07-28 PROCEDURE — 1125F AMNT PAIN NOTED PAIN PRSNT: CPT | Mod: CPTII,S$GLB,, | Performed by: INTERNAL MEDICINE

## 2021-07-28 PROCEDURE — 3078F PR MOST RECENT DIASTOLIC BLOOD PRESSURE < 80 MM HG: ICD-10-PCS | Mod: CPTII,S$GLB,, | Performed by: INTERNAL MEDICINE

## 2021-07-28 PROCEDURE — 3074F PR MOST RECENT SYSTOLIC BLOOD PRESSURE < 130 MM HG: ICD-10-PCS | Mod: CPTII,S$GLB,, | Performed by: INTERNAL MEDICINE

## 2021-07-28 PROCEDURE — 3288F FALL RISK ASSESSMENT DOCD: CPT | Mod: CPTII,S$GLB,, | Performed by: INTERNAL MEDICINE

## 2021-07-28 PROCEDURE — 83036 HEMOGLOBIN GLYCOSYLATED A1C: CPT | Performed by: INTERNAL MEDICINE

## 2021-07-28 PROCEDURE — 99499 RISK ADDL DX/OHS AUDIT: ICD-10-PCS | Mod: S$PBB,HCNC,, | Performed by: INTERNAL MEDICINE

## 2021-07-28 PROCEDURE — 86140 C-REACTIVE PROTEIN: CPT | Performed by: INTERNAL MEDICINE

## 2021-07-28 PROCEDURE — 1159F MED LIST DOCD IN RCRD: CPT | Mod: CPTII,S$GLB,, | Performed by: INTERNAL MEDICINE

## 2021-07-28 PROCEDURE — 85652 RBC SED RATE AUTOMATED: CPT | Performed by: INTERNAL MEDICINE

## 2021-07-28 PROCEDURE — 99999 PR PBB SHADOW E&M-EST. PATIENT-LVL III: CPT | Mod: PBBFAC,,, | Performed by: INTERNAL MEDICINE

## 2021-07-28 RX ORDER — PANTOPRAZOLE SODIUM 40 MG/1
TABLET, DELAYED RELEASE ORAL
Qty: 60 TABLET | Refills: 4 | Status: SHIPPED | OUTPATIENT
Start: 2021-07-28 | End: 2021-08-10

## 2021-07-28 RX ORDER — PANTOPRAZOLE SODIUM 40 MG/1
TABLET, DELAYED RELEASE ORAL
Qty: 180 TABLET | Refills: 4 | Status: SHIPPED | OUTPATIENT
Start: 2021-07-28 | End: 2021-07-28 | Stop reason: SDUPTHER

## 2021-07-28 RX ORDER — OXYCODONE AND ACETAMINOPHEN 10; 325 MG/1; MG/1
1 TABLET ORAL
Qty: 150 TABLET | Refills: 0 | Status: SHIPPED | OUTPATIENT
Start: 2021-07-28 | End: 2021-09-13 | Stop reason: SDUPTHER

## 2021-07-28 RX ORDER — GABAPENTIN 300 MG/1
300 CAPSULE ORAL 3 TIMES DAILY
Qty: 90 CAPSULE | Refills: 4 | Status: SHIPPED | OUTPATIENT
Start: 2021-07-28 | End: 2021-08-10

## 2021-07-29 ENCOUNTER — TELEPHONE (OUTPATIENT)
Dept: ENDOSCOPY | Facility: HOSPITAL | Age: 81
End: 2021-07-29

## 2021-07-29 ENCOUNTER — OFFICE VISIT (OUTPATIENT)
Dept: GASTROENTEROLOGY | Facility: CLINIC | Age: 81
End: 2021-07-29
Payer: MEDICARE

## 2021-07-29 VITALS
SYSTOLIC BLOOD PRESSURE: 131 MMHG | HEART RATE: 83 BPM | WEIGHT: 224 LBS | DIASTOLIC BLOOD PRESSURE: 75 MMHG | BODY MASS INDEX: 33.95 KG/M2 | HEIGHT: 68 IN

## 2021-07-29 DIAGNOSIS — R10.9 ABDOMINAL PAIN, UNSPECIFIED ABDOMINAL LOCATION: Primary | ICD-10-CM

## 2021-07-29 LAB
ALBUMIN SERPL BCP-MCNC: 3.4 G/DL (ref 3.5–5.2)
ALP SERPL-CCNC: 90 U/L (ref 55–135)
ALT SERPL W/O P-5'-P-CCNC: 23 U/L (ref 10–44)
ANION GAP SERPL CALC-SCNC: 12 MMOL/L (ref 8–16)
AST SERPL-CCNC: 27 U/L (ref 10–40)
BILIRUB SERPL-MCNC: 0.4 MG/DL (ref 0.1–1)
BUN SERPL-MCNC: 19 MG/DL (ref 8–23)
CALCIUM SERPL-MCNC: 10.3 MG/DL (ref 8.7–10.5)
CHLORIDE SERPL-SCNC: 103 MMOL/L (ref 95–110)
CO2 SERPL-SCNC: 26 MMOL/L (ref 23–29)
CREAT SERPL-MCNC: 1.4 MG/DL (ref 0.5–1.4)
CRP SERPL-MCNC: 8.8 MG/L (ref 0–8.2)
EST. GFR  (AFRICAN AMERICAN): 40.6 ML/MIN/1.73 M^2
EST. GFR  (NON AFRICAN AMERICAN): 35.3 ML/MIN/1.73 M^2
GLUCOSE SERPL-MCNC: 156 MG/DL (ref 70–110)
POTASSIUM SERPL-SCNC: 4.6 MMOL/L (ref 3.5–5.1)
PROT SERPL-MCNC: 7.9 G/DL (ref 6–8.4)
SODIUM SERPL-SCNC: 141 MMOL/L (ref 136–145)
TSH SERPL DL<=0.005 MIU/L-ACNC: 1.44 UIU/ML (ref 0.4–4)
URATE SERPL-MCNC: 6.9 MG/DL (ref 2.4–5.7)

## 2021-07-29 PROCEDURE — 99213 OFFICE O/P EST LOW 20 MIN: CPT | Mod: GC,S$GLB,, | Performed by: STUDENT IN AN ORGANIZED HEALTH CARE EDUCATION/TRAINING PROGRAM

## 2021-07-29 PROCEDURE — 99213 PR OFFICE/OUTPT VISIT, EST, LEVL III, 20-29 MIN: ICD-10-PCS | Mod: GC,S$GLB,, | Performed by: STUDENT IN AN ORGANIZED HEALTH CARE EDUCATION/TRAINING PROGRAM

## 2021-07-29 PROCEDURE — 99999 PR PBB SHADOW E&M-EST. PATIENT-LVL IV: ICD-10-PCS | Mod: PBBFAC,GC,, | Performed by: STUDENT IN AN ORGANIZED HEALTH CARE EDUCATION/TRAINING PROGRAM

## 2021-07-29 PROCEDURE — 99999 PR PBB SHADOW E&M-EST. PATIENT-LVL IV: CPT | Mod: PBBFAC,GC,, | Performed by: STUDENT IN AN ORGANIZED HEALTH CARE EDUCATION/TRAINING PROGRAM

## 2021-08-02 ENCOUNTER — TELEPHONE (OUTPATIENT)
Dept: INTERNAL MEDICINE | Facility: CLINIC | Age: 81
End: 2021-08-02

## 2021-08-02 RX ORDER — DOXYCYCLINE HYCLATE 100 MG
100 TABLET ORAL 2 TIMES DAILY
Qty: 20 TABLET | Refills: 0 | Status: SHIPPED | OUTPATIENT
Start: 2021-08-02 | End: 2021-08-13

## 2021-08-04 DIAGNOSIS — E11.42 DIABETIC POLYNEUROPATHY ASSOCIATED WITH TYPE 2 DIABETES MELLITUS: ICD-10-CM

## 2021-08-05 RX ORDER — DULOXETIN HYDROCHLORIDE 60 MG/1
60 CAPSULE, DELAYED RELEASE ORAL 2 TIMES DAILY
Qty: 180 CAPSULE | Refills: 3 | Status: SHIPPED | OUTPATIENT
Start: 2021-08-05 | End: 2022-07-26

## 2021-08-06 ENCOUNTER — TELEPHONE (OUTPATIENT)
Dept: INTERNAL MEDICINE | Facility: CLINIC | Age: 81
End: 2021-08-06

## 2021-08-10 ENCOUNTER — TELEPHONE (OUTPATIENT)
Dept: INTERNAL MEDICINE | Facility: CLINIC | Age: 81
End: 2021-08-10

## 2021-08-10 RX ORDER — GABAPENTIN 300 MG/1
300 CAPSULE ORAL 3 TIMES DAILY
Qty: 270 CAPSULE | Refills: 0 | Status: SHIPPED | OUTPATIENT
Start: 2021-08-10 | End: 2021-08-18 | Stop reason: SDUPTHER

## 2021-08-10 RX ORDER — PANTOPRAZOLE SODIUM 40 MG/1
40 TABLET, DELAYED RELEASE ORAL DAILY
Qty: 90 TABLET | Refills: 0 | Status: SHIPPED | OUTPATIENT
Start: 2021-08-10 | End: 2021-08-18 | Stop reason: SDUPTHER

## 2021-08-11 RX ORDER — PREDNISONE 10 MG/1
TABLET ORAL
Qty: 9 TABLET | Refills: 0 | Status: SHIPPED | OUTPATIENT
Start: 2021-08-11 | End: 2021-10-04 | Stop reason: SDUPTHER

## 2021-08-18 DIAGNOSIS — E11.59 TYPE 2 DIABETES MELLITUS WITH OTHER CIRCULATORY COMPLICATION, WITH LONG-TERM CURRENT USE OF INSULIN: ICD-10-CM

## 2021-08-18 DIAGNOSIS — Z79.4 TYPE 2 DIABETES MELLITUS WITH OTHER CIRCULATORY COMPLICATION, WITH LONG-TERM CURRENT USE OF INSULIN: ICD-10-CM

## 2021-08-18 RX ORDER — GABAPENTIN 300 MG/1
300 CAPSULE ORAL 3 TIMES DAILY
Qty: 270 CAPSULE | Refills: 0 | Status: SHIPPED | OUTPATIENT
Start: 2021-08-18 | End: 2021-11-16

## 2021-08-18 RX ORDER — PANTOPRAZOLE SODIUM 40 MG/1
40 TABLET, DELAYED RELEASE ORAL DAILY
Qty: 90 TABLET | Refills: 0 | Status: SHIPPED | OUTPATIENT
Start: 2021-08-18 | End: 2022-01-03 | Stop reason: SDUPTHER

## 2021-08-25 NOTE — TELEPHONE ENCOUNTER
----- Message from Laya Chiu sent at 3/26/2018  2:28 PM CDT -----  Pt is wanting a refill on her percocet please call    Terbinafine Pregnancy And Lactation Text: This medication is Pregnancy Category B and is considered safe during pregnancy. It is also excreted in breast milk and breast feeding isn't recommended.

## 2021-08-25 NOTE — PROGRESS NOTES
----- Message from Nusrat Padilla RN sent at 7/12/2021  8:35 AM CDT -----  Regarding: CHF WEEKLY HOSP 30D  CHF weekly and PRN follow up. DC 7/9/21.  Follow up acutely through 8/9/21.  [facility] to [facility].    [facility]  T [phone]  fax [phone]        Subjective:      Patient ID: Henrietta Villasenor is a 78 y.o. female.    Chief Complaint:      Patient is a 78 yr old lady with type 2 diabetes and CAD seen for initial care visit today for possible enrollment in the TACT 2 study.    History of Present Illness    Patient is a 78 yr old lady with type 2 diabetes, CAD and past history of MI seen for initial screening visit today for possible enrollment in the TACT 2 study.  She has an extensive background history of comorbidities as detailed below;    1. Type 2 diabetes mellitus with hyperglycemia, with long-term current use of insulin     2. Spinal stenosis of lumbar region with neurogenic claudication     3. Cluster headache, not intractable, unspecified chronicity pattern     4. Diabetic polyneuropathy associated with type 2 diabetes mellitus     5. Sensorineural hearing loss (SNHL) of both ears     6. Hyperlipidemia, unspecified hyperlipidemia type     7. Hypercholesterolemia     8. Coronary artery disease involving native coronary artery of native heart with angina pectoris     9. Essential hypertension     10. Ectatic aorta     11. Aortic atherosclerosis     12. Obesity (BMI 30.0-34.9)     13. Hyperparathyroidism     14. Gastroesophageal reflux disease without esophagitis     15. Fatty liver     16. MARLON on CPAP     17. Hypovitaminosis D     18. Stage 3 chronic kidney disease     19. Research study patient     20. History of MI (myocardial infarction)       Her present antidiabetic regimen consists of novolog 23 units TID QAC and lantus 60 units QHS and her most recent HBA1c from 10/18 was 8.4.  She is being ffed by Dr Karson Fermin for ongoing cardiology care and her PCP is Dr Jeannine Arriaga.  The patient had been sen in the past by Dr Randal Ryan and Sharda Schilling for diabetes and endocrine care.   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  Patient had a heart  attack over 15 years ago which appears to have been an NSTEMI. This was managed at the [facility] and she had a cardiac catheterization and what appears to be possible thrombolysis done at that time. She was on admission for about 2 weeks following that episode and she stopped smoking after that episode. She has not had any subsequent PCIs or CABG intervention since then.      Review of Systems   Constitutional: Negative for activity change, appetite change, chills, diaphoresis, fatigue and unexpected weight change.   HENT: Positive for hearing loss (chronic and bilateral.). Negative for ear pain, facial swelling, nosebleeds, sinus pressure, tinnitus, trouble swallowing and voice change.    Eyes: Negative for photophobia, redness and visual disturbance.   Respiratory: Negative for cough and shortness of breath.    Cardiovascular: Negative for chest pain, palpitations and leg swelling.   Gastrointestinal: Negative for abdominal distention, abdominal pain, constipation, diarrhea, nausea and vomiting.   Endocrine: Negative for cold intolerance, heat intolerance, polydipsia, polyphagia and polyuria.   Genitourinary: Negative for difficulty urinating, dysuria, flank pain, frequency, hematuria, menstrual problem (postmenopausal) and urgency.   Musculoskeletal: Negative for arthralgias, back pain, gait problem, joint swelling, myalgias, neck pain and neck stiffness.   Skin: Negative for color change, pallor and rash.   Neurological: Positive for numbness (in both feet and legs; chronic). Negative for dizziness, tremors, seizures, syncope, weakness, light-headedness and headaches.   Hematological: Does not bruise/bleed easily.   Psychiatric/Behavioral: Positive for sleep disturbance (OSAS but not using CPAP consistently.). Negative for agitation, confusion, dysphoric mood and hallucinations. The patient is not nervous/anxious.        Objective: /70 (BP Location: Right arm, Patient Position: Sitting, BP  "Method: X-Large (Automatic))   Pulse 64   Temp 97.4 °F (36.3 °C) (Oral)   Resp 18   Ht 5' 8" (1.727 m)   Wt 98.7 kg (217 lb 8 oz)   LMP  (LMP Unknown)   BMI 33.07 kg/m²  Body surface area is 2.18 meters squared.      LMP  (LMP Unknown) ; postmenopausal       Physical Exam   Constitutional: She is oriented to person, place, and time. Vital signs are normal. She appears well-developed and well-nourished. She does not appear ill. No distress.   Pleasant elderly lady. Not pale, anicteric, afebrile and well hydrated. Not in any acute distress.  Has left facial nerve LMN palsy with associated mild speech dysarthria.   HENT:   Head: Normocephalic and atraumatic. Not macrocephalic. Head is without right periorbital erythema and without left periorbital erythema. Hair is normal.       Nose: Nose normal.   Mouth/Throat: Oropharynx is clear and moist. Mucous membranes are not pale and not dry. No oropharyngeal exudate.   Nuchal AN. Mallampati grade 4 fauces.   Eyes: Conjunctivae and lids are normal. Pupils are equal, round, and reactive to light. Right eye exhibits no discharge. Left eye exhibits no discharge. No scleral icterus.   Left LMN facial nerve palsy with associated left sided facial droop.   Neck: Trachea normal, normal range of motion, full passive range of motion without pain and phonation normal. Neck supple. Normal carotid pulses and no JVD present. Carotid bruit is not present. No tracheal deviation present. No thyroid mass and no thyromegaly present.       Cardiovascular: Normal rate, regular rhythm, S1 normal, S2 normal, normal heart sounds, intact distal pulses and normal pulses. PMI is not displaced. Exam reveals no gallop.   No murmur heard.  Pulmonary/Chest: Effort normal and breath sounds normal. No respiratory distress. She has no wheezes. She has no rales.   Abdominal: Soft. Bowel sounds are normal. She exhibits no distension and no mass. There is no hepatosplenomegaly, splenomegaly or " hepatomegaly. There is no tenderness. There is no rebound, no guarding and no CVA tenderness. No hernia. Hernia confirmed negative in the ventral area.   Obese anterior abdominal wall.   Musculoskeletal: She exhibits no edema or tenderness.        Right shoulder: She exhibits normal range of motion, no bony tenderness, no crepitus, no deformity, no pain, no spasm, normal pulse and normal strength.   No pedal edema nor calf swelling.   Lymphadenopathy:     She has no cervical adenopathy.        Right: No inguinal adenopathy present.        Left: No inguinal adenopathy present.   Neurological: She is alert and oriented to person, place, and time. She has normal strength and normal reflexes. She displays no atrophy, no tremor and normal reflexes. A sensory deficit (absent vibration sensation in both feet and legs up to the knees bilaterally.) is present. No cranial nerve deficit. She exhibits normal muscle tone. Coordination and gait normal.   Skin: Skin is warm, dry and intact. No bruising, no ecchymosis, no petechiae and no rash noted. She is not diaphoretic. No cyanosis or erythema. No pallor. Nails show no clubbing.   Psychiatric: She has a normal mood and affect. Her behavior is normal. Judgment and thought content normal. Her speech is slurred. Cognition and memory are normal.   Nursing note and vitals reviewed.      Lab Review:     Results for BRITTANY SWEET (MRN [id number]) as of 11/29/2018 11:09   Ref. Range 9/6/2018 12:51 10/3/2018 12:50 10/31/2018 07:25   WBC Latest Ref Range: 3.90 - 12.70 K/uL  11.90    RBC Latest Ref Range: 4.00 - 5.40 M/uL  4.52    Hemoglobin Latest Ref Range: 12.0 - 16.0 g/dL  12.5    Hematocrit Latest Ref Range: 37.0 - 48.5 %  39.4    MCV Latest Ref Range: 82 - 98 fL  87    MCH Latest Ref Range: 27.0 - 31.0 pg  27.7    MCHC Latest Ref Range: 32.0 - 36.0 g/dL  31.7 (L)    RDW Latest Ref Range: 11.5 - 14.5 %  14.1    Platelets Latest Ref Range: 150 - 350 K/uL  285    MPV Latest Ref Range:  9.2 - 12.9 fL  10.2    Gran% Latest Ref Range: 38.0 - 73.0 %  70.3    Gran # (ANC) Latest Ref Range: 1.8 - 7.7 K/uL  8.4 (H)    Lymph% Latest Ref Range: 18.0 - 48.0 %  18.3    Lymph # Latest Ref Range: 1.0 - 4.8 K/uL  2.2    Mono% Latest Ref Range: 4.0 - 15.0 %  9.1    Mono # Latest Ref Range: 0.3 - 1.0 K/uL  1.1 (H)    Eosinophil% Latest Ref Range: 0.0 - 8.0 %  1.7    Eos # Latest Ref Range: 0.0 - 0.5 K/uL  0.2    Basophil% Latest Ref Range: 0.0 - 1.9 %  0.3    Baso # Latest Ref Range: 0.00 - 0.20 K/uL  0.03    Differential Method Unknown  Automated    Sed Rate Latest Ref Range: 0 - 36 mm/Hr  62 (H)    Sodium Latest Ref Range: 136 - 145 mmol/L  140    Potassium Latest Ref Range: 3.5 - 5.1 mmol/L  3.2 (L)    Chloride Latest Ref Range: 95 - 110 mmol/L  96    CO2 Latest Ref Range: 23 - 29 mmol/L  31 (H)    Anion Gap Latest Ref Range: 8 - 16 mmol/L  13    BUN, Bld Latest Ref Range: 8 - 23 mg/dL  8    Creatinine Latest Ref Range: 0.5 - 1.4 mg/dL  1.2    eGFR if non African American Latest Ref Range: >60 mL/min/1.73 m^2  43 (A)    eGFR if African American Latest Ref Range: >60 mL/min/1.73 m^2  50 (A)    Glucose Latest Ref Range: 70 - 110 mg/dL  220 (H)    Calcium Latest Ref Range: 8.7 - 10.5 mg/dL  9.3    Alkaline Phosphatase Latest Ref Range: 55 - 135 U/L  82    Total Protein Latest Ref Range: 6.0 - 8.4 g/dL  7.6    Albumin Latest Ref Range: 3.5 - 5.2 g/dL  3.1 (L)    Uric Acid Latest Ref Range: 2.4 - 5.7 mg/dL  8.8 (H)    Total Bilirubin Latest Ref Range: 0.1 - 1.0 mg/dL  0.6    AST Latest Ref Range: 10 - 40 U/L  16    ALT Latest Ref Range: 10 - 44 U/L  11    Amylase Latest Ref Range: 20 - 110 U/L  25    Lipase Latest Ref Range: 4 - 60 U/L  16    CRP Latest Ref Range: 0.0 - 8.2 mg/L  25.1 (H)    CPK Latest Ref Range: 20 - 180 U/L  79    Hemoglobin A1C Latest Ref Range: 4.0 - 5.6 %  8.4 (H)    Estimated Avg Glucose Latest Ref Range: 68 - 131 mg/dL  194 (H)    POCT Glucose Latest Ref Range: 70 - 110 mg/dL   143 (H)        Assessment:     1. Type 2 diabetes mellitus with hyperglycemia, with long-term current use of insulin     2. Spinal stenosis of lumbar region with neurogenic claudication     3. Cluster headache, not intractable, unspecified chronicity pattern     4. Diabetic polyneuropathy associated with type 2 diabetes mellitus     5. Sensorineural hearing loss (SNHL) of both ears     6. Hyperlipidemia, unspecified hyperlipidemia type     7. Hypercholesterolemia     8. Coronary artery disease involving native coronary artery of native heart with angina pectoris     9. Essential hypertension     10. Ectatic aorta     11. Aortic atherosclerosis     12. Obesity (BMI 30.0-34.9)     13. Hyperparathyroidism     14. Gastroesophageal reflux disease without esophagitis     15. Fatty liver     16. MARLON on CPAP     17. Hypovitaminosis D     18. Stage 3 chronic kidney disease     19. Research study patient     20. History of MI (myocardial infarction)        Regarding type 2 diabetes; to continue present treatment regimen and SMBG testing. Will obtain current HBA1c today as part of study enrollment screening labs.  Regarding CAD; clinically stable. To continue ongoing ffup and management as per cardiology team.  Regarding hyperlipidemia; To continue antilipidemics as before and low dose asa for secondary ASCVD prophylaxis.  Regarding hypertension; BP presently well controlled. To continue serial tracking of ambulatory BP trends. No change to present antihypertensive regimen.  Regarding GERD; symptomatically stable.  Regarding hypovitaminosis ; to continue OTC vitamin d supplementation as before.  Regarding left facial nerve palsy; chronic and stable.  Regarding OSAS; patient uses CPAP erratically. Encouraged to try and use CPAP more consistently but she indicates that it causes her quite distressing mouth dryness.      Plan:     FFup in 1-2 weeks after review of her study enrollment labs to determine if she would qualify for the TACT-2  study.

## 2021-09-13 RX ORDER — OXYCODONE AND ACETAMINOPHEN 10; 325 MG/1; MG/1
1 TABLET ORAL
Qty: 150 TABLET | Refills: 0 | Status: CANCELLED | OUTPATIENT
Start: 2021-09-13

## 2021-09-13 RX ORDER — OXYCODONE AND ACETAMINOPHEN 10; 325 MG/1; MG/1
1 TABLET ORAL
Qty: 150 TABLET | Refills: 0 | Status: SHIPPED | OUTPATIENT
Start: 2021-09-13 | End: 2021-10-04 | Stop reason: SDUPTHER

## 2021-09-16 ENCOUNTER — TELEPHONE (OUTPATIENT)
Dept: INTERNAL MEDICINE | Facility: CLINIC | Age: 81
End: 2021-09-16

## 2021-09-16 DIAGNOSIS — Z79.4 TYPE 2 DIABETES MELLITUS WITH OTHER CIRCULATORY COMPLICATION, WITH LONG-TERM CURRENT USE OF INSULIN: ICD-10-CM

## 2021-09-16 DIAGNOSIS — E11.59 TYPE 2 DIABETES MELLITUS WITH OTHER CIRCULATORY COMPLICATION, WITH LONG-TERM CURRENT USE OF INSULIN: ICD-10-CM

## 2021-10-04 ENCOUNTER — OFFICE VISIT (OUTPATIENT)
Dept: INTERNAL MEDICINE | Facility: CLINIC | Age: 81
End: 2021-10-04
Payer: MEDICARE

## 2021-10-04 ENCOUNTER — TELEPHONE (OUTPATIENT)
Dept: INTERNAL MEDICINE | Facility: CLINIC | Age: 81
End: 2021-10-04

## 2021-10-04 ENCOUNTER — IMMUNIZATION (OUTPATIENT)
Dept: INTERNAL MEDICINE | Facility: CLINIC | Age: 81
End: 2021-10-04
Payer: MEDICARE

## 2021-10-04 VITALS
SYSTOLIC BLOOD PRESSURE: 130 MMHG | WEIGHT: 220 LBS | DIASTOLIC BLOOD PRESSURE: 60 MMHG | HEIGHT: 68 IN | HEART RATE: 68 BPM | BODY MASS INDEX: 33.34 KG/M2 | OXYGEN SATURATION: 95 %

## 2021-10-04 DIAGNOSIS — F39 MOOD DISORDER: Primary | ICD-10-CM

## 2021-10-04 DIAGNOSIS — M10.9 GOUT, UNSPECIFIED CAUSE, UNSPECIFIED CHRONICITY, UNSPECIFIED SITE: ICD-10-CM

## 2021-10-04 DIAGNOSIS — Z87.898 HISTORY OF WHEEZING: ICD-10-CM

## 2021-10-04 PROCEDURE — 3078F PR MOST RECENT DIASTOLIC BLOOD PRESSURE < 80 MM HG: ICD-10-PCS | Mod: HCNC,CPTII,S$GLB, | Performed by: PHYSICIAN ASSISTANT

## 2021-10-04 PROCEDURE — 1125F AMNT PAIN NOTED PAIN PRSNT: CPT | Mod: HCNC,CPTII,S$GLB, | Performed by: PHYSICIAN ASSISTANT

## 2021-10-04 PROCEDURE — 3072F PR LOW RISK FOR RETINOPATHY: ICD-10-PCS | Mod: HCNC,CPTII,S$GLB, | Performed by: PHYSICIAN ASSISTANT

## 2021-10-04 PROCEDURE — 99999 PR PBB SHADOW E&M-EST. PATIENT-LVL V: CPT | Mod: PBBFAC,HCNC,, | Performed by: PHYSICIAN ASSISTANT

## 2021-10-04 PROCEDURE — 3072F LOW RISK FOR RETINOPATHY: CPT | Mod: HCNC,CPTII,S$GLB, | Performed by: PHYSICIAN ASSISTANT

## 2021-10-04 PROCEDURE — 99214 PR OFFICE/OUTPT VISIT, EST, LEVL IV, 30-39 MIN: ICD-10-PCS | Mod: 25,HCNC,S$GLB, | Performed by: PHYSICIAN ASSISTANT

## 2021-10-04 PROCEDURE — 3288F PR FALLS RISK ASSESSMENT DOCUMENTED: ICD-10-PCS | Mod: HCNC,CPTII,S$GLB, | Performed by: PHYSICIAN ASSISTANT

## 2021-10-04 PROCEDURE — 1101F PT FALLS ASSESS-DOCD LE1/YR: CPT | Mod: HCNC,CPTII,S$GLB, | Performed by: PHYSICIAN ASSISTANT

## 2021-10-04 PROCEDURE — G0008 FLU VACCINE - QUADRIVALENT - ADJUVANTED: ICD-10-PCS | Mod: HCNC,S$GLB,, | Performed by: PHYSICIAN ASSISTANT

## 2021-10-04 PROCEDURE — 3288F FALL RISK ASSESSMENT DOCD: CPT | Mod: HCNC,CPTII,S$GLB, | Performed by: PHYSICIAN ASSISTANT

## 2021-10-04 PROCEDURE — 99499 RISK ADDL DX/OHS AUDIT: ICD-10-PCS | Mod: S$GLB,,, | Performed by: PHYSICIAN ASSISTANT

## 2021-10-04 PROCEDURE — 99499 UNLISTED E&M SERVICE: CPT | Mod: S$GLB,,, | Performed by: PHYSICIAN ASSISTANT

## 2021-10-04 PROCEDURE — 3075F PR MOST RECENT SYSTOLIC BLOOD PRESS GE 130-139MM HG: ICD-10-PCS | Mod: HCNC,CPTII,S$GLB, | Performed by: PHYSICIAN ASSISTANT

## 2021-10-04 PROCEDURE — 90694 FLU VACCINE - QUADRIVALENT - ADJUVANTED: ICD-10-PCS | Mod: HCNC,S$GLB,, | Performed by: PHYSICIAN ASSISTANT

## 2021-10-04 PROCEDURE — 99214 OFFICE O/P EST MOD 30 MIN: CPT | Mod: 25,HCNC,S$GLB, | Performed by: PHYSICIAN ASSISTANT

## 2021-10-04 PROCEDURE — 1160F RVW MEDS BY RX/DR IN RCRD: CPT | Mod: HCNC,CPTII,S$GLB, | Performed by: PHYSICIAN ASSISTANT

## 2021-10-04 PROCEDURE — 1101F PR PT FALLS ASSESS DOC 0-1 FALLS W/OUT INJ PAST YR: ICD-10-PCS | Mod: HCNC,CPTII,S$GLB, | Performed by: PHYSICIAN ASSISTANT

## 2021-10-04 PROCEDURE — 1159F PR MEDICATION LIST DOCUMENTED IN MEDICAL RECORD: ICD-10-PCS | Mod: HCNC,CPTII,S$GLB, | Performed by: PHYSICIAN ASSISTANT

## 2021-10-04 PROCEDURE — 3078F DIAST BP <80 MM HG: CPT | Mod: HCNC,CPTII,S$GLB, | Performed by: PHYSICIAN ASSISTANT

## 2021-10-04 PROCEDURE — 3075F SYST BP GE 130 - 139MM HG: CPT | Mod: HCNC,CPTII,S$GLB, | Performed by: PHYSICIAN ASSISTANT

## 2021-10-04 PROCEDURE — 1125F PR PAIN SEVERITY QUANTIFIED, PAIN PRESENT: ICD-10-PCS | Mod: HCNC,CPTII,S$GLB, | Performed by: PHYSICIAN ASSISTANT

## 2021-10-04 PROCEDURE — 99999 PR PBB SHADOW E&M-EST. PATIENT-LVL V: ICD-10-PCS | Mod: PBBFAC,HCNC,, | Performed by: PHYSICIAN ASSISTANT

## 2021-10-04 PROCEDURE — G0008 ADMIN INFLUENZA VIRUS VAC: HCPCS | Mod: HCNC,S$GLB,, | Performed by: PHYSICIAN ASSISTANT

## 2021-10-04 PROCEDURE — 1160F PR REVIEW ALL MEDS BY PRESCRIBER/CLIN PHARMACIST DOCUMENTED: ICD-10-PCS | Mod: HCNC,CPTII,S$GLB, | Performed by: PHYSICIAN ASSISTANT

## 2021-10-04 PROCEDURE — 90694 VACC AIIV4 NO PRSRV 0.5ML IM: CPT | Mod: HCNC,S$GLB,, | Performed by: PHYSICIAN ASSISTANT

## 2021-10-04 PROCEDURE — 1159F MED LIST DOCD IN RCRD: CPT | Mod: HCNC,CPTII,S$GLB, | Performed by: PHYSICIAN ASSISTANT

## 2021-10-04 RX ORDER — ALBUTEROL SULFATE 90 UG/1
2 AEROSOL, METERED RESPIRATORY (INHALATION) EVERY 4 HOURS PRN
Qty: 18 G | Refills: 1 | Status: SHIPPED | OUTPATIENT
Start: 2021-10-04 | End: 2022-02-09 | Stop reason: SDUPTHER

## 2021-10-04 RX ORDER — PREDNISONE 10 MG/1
TABLET ORAL
Qty: 9 TABLET | Refills: 0 | Status: SHIPPED | OUTPATIENT
Start: 2021-10-04 | End: 2021-11-16

## 2021-10-04 RX ORDER — FUROSEMIDE 40 MG/1
TABLET ORAL
Qty: 180 TABLET | Refills: 4 | Status: SHIPPED | OUTPATIENT
Start: 2021-10-04 | End: 2022-01-28

## 2021-10-04 RX ORDER — OXYCODONE AND ACETAMINOPHEN 10; 325 MG/1; MG/1
1 TABLET ORAL
Qty: 150 TABLET | Refills: 0 | Status: SHIPPED | OUTPATIENT
Start: 2021-10-13 | End: 2021-11-04 | Stop reason: SDUPTHER

## 2021-10-04 RX ORDER — FLUTICASONE PROPIONATE 50 MCG
SPRAY, SUSPENSION (ML) NASAL
Qty: 16 G | Refills: 1 | Status: SHIPPED | OUTPATIENT
Start: 2021-10-04 | End: 2023-03-08 | Stop reason: SDUPTHER

## 2021-10-12 ENCOUNTER — OFFICE VISIT (OUTPATIENT)
Dept: CARDIOLOGY | Facility: CLINIC | Age: 81
End: 2021-10-12
Payer: MEDICARE

## 2021-10-12 ENCOUNTER — TELEPHONE (OUTPATIENT)
Dept: PODIATRY | Facility: CLINIC | Age: 81
End: 2021-10-12

## 2021-10-12 VITALS
HEART RATE: 75 BPM | HEIGHT: 68 IN | SYSTOLIC BLOOD PRESSURE: 116 MMHG | DIASTOLIC BLOOD PRESSURE: 62 MMHG | WEIGHT: 223.31 LBS | BODY MASS INDEX: 33.84 KG/M2 | OXYGEN SATURATION: 96 %

## 2021-10-12 DIAGNOSIS — I77.819 ECTATIC AORTA: ICD-10-CM

## 2021-10-12 DIAGNOSIS — M79.2 NEUROPATHIC PAIN: ICD-10-CM

## 2021-10-12 DIAGNOSIS — E66.9 OBESITY (BMI 30.0-34.9): ICD-10-CM

## 2021-10-12 DIAGNOSIS — S40.022A HEMATOMA OF ARM, LEFT, INITIAL ENCOUNTER: ICD-10-CM

## 2021-10-12 DIAGNOSIS — I70.0 AORTIC ATHEROSCLEROSIS: ICD-10-CM

## 2021-10-12 DIAGNOSIS — E11.42 TYPE 2 DIABETES MELLITUS WITH DIABETIC POLYNEUROPATHY, WITHOUT LONG-TERM CURRENT USE OF INSULIN: Chronic | ICD-10-CM

## 2021-10-12 DIAGNOSIS — I73.9 PAD (PERIPHERAL ARTERY DISEASE): ICD-10-CM

## 2021-10-12 DIAGNOSIS — R60.0 BILATERAL LEG EDEMA: Primary | ICD-10-CM

## 2021-10-12 DIAGNOSIS — I87.2 VENOUS INSUFFICIENCY OF BOTH LOWER EXTREMITIES: ICD-10-CM

## 2021-10-12 DIAGNOSIS — I82.433 DEEP VEIN THROMBOSIS (DVT) OF POPLITEAL VEIN OF BOTH LOWER EXTREMITIES, UNSPECIFIED CHRONICITY: ICD-10-CM

## 2021-10-12 PROCEDURE — 99999 PR PBB SHADOW E&M-EST. PATIENT-LVL V: ICD-10-PCS | Mod: PBBFAC,HCNC,, | Performed by: INTERNAL MEDICINE

## 2021-10-12 PROCEDURE — 99999 PR PBB SHADOW E&M-EST. PATIENT-LVL V: CPT | Mod: PBBFAC,HCNC,, | Performed by: INTERNAL MEDICINE

## 2021-10-12 PROCEDURE — 3052F HG A1C>EQUAL 8.0%<EQUAL 9.0%: CPT | Mod: HCNC,CPTII,S$GLB, | Performed by: INTERNAL MEDICINE

## 2021-10-12 PROCEDURE — 1159F MED LIST DOCD IN RCRD: CPT | Mod: HCNC,CPTII,S$GLB, | Performed by: INTERNAL MEDICINE

## 2021-10-12 PROCEDURE — 1159F PR MEDICATION LIST DOCUMENTED IN MEDICAL RECORD: ICD-10-PCS | Mod: HCNC,CPTII,S$GLB, | Performed by: INTERNAL MEDICINE

## 2021-10-12 PROCEDURE — 3074F SYST BP LT 130 MM HG: CPT | Mod: HCNC,CPTII,S$GLB, | Performed by: INTERNAL MEDICINE

## 2021-10-12 PROCEDURE — 3052F PR MOST RECENT HEMOGLOBIN A1C LEVEL 8.0 - < 9.0%: ICD-10-PCS | Mod: HCNC,CPTII,S$GLB, | Performed by: INTERNAL MEDICINE

## 2021-10-12 PROCEDURE — 99215 PR OFFICE/OUTPT VISIT, EST, LEVL V, 40-54 MIN: ICD-10-PCS | Mod: HCNC,S$GLB,, | Performed by: INTERNAL MEDICINE

## 2021-10-12 PROCEDURE — 3078F DIAST BP <80 MM HG: CPT | Mod: HCNC,CPTII,S$GLB, | Performed by: INTERNAL MEDICINE

## 2021-10-12 PROCEDURE — 3072F LOW RISK FOR RETINOPATHY: CPT | Mod: HCNC,CPTII,S$GLB, | Performed by: INTERNAL MEDICINE

## 2021-10-12 PROCEDURE — 1125F PR PAIN SEVERITY QUANTIFIED, PAIN PRESENT: ICD-10-PCS | Mod: HCNC,CPTII,S$GLB, | Performed by: INTERNAL MEDICINE

## 2021-10-12 PROCEDURE — 3078F PR MOST RECENT DIASTOLIC BLOOD PRESSURE < 80 MM HG: ICD-10-PCS | Mod: HCNC,CPTII,S$GLB, | Performed by: INTERNAL MEDICINE

## 2021-10-12 PROCEDURE — 3074F PR MOST RECENT SYSTOLIC BLOOD PRESSURE < 130 MM HG: ICD-10-PCS | Mod: HCNC,CPTII,S$GLB, | Performed by: INTERNAL MEDICINE

## 2021-10-12 PROCEDURE — 99215 OFFICE O/P EST HI 40 MIN: CPT | Mod: HCNC,S$GLB,, | Performed by: INTERNAL MEDICINE

## 2021-10-12 PROCEDURE — 3288F PR FALLS RISK ASSESSMENT DOCUMENTED: ICD-10-PCS | Mod: HCNC,CPTII,S$GLB, | Performed by: INTERNAL MEDICINE

## 2021-10-12 PROCEDURE — 1101F PR PT FALLS ASSESS DOC 0-1 FALLS W/OUT INJ PAST YR: ICD-10-PCS | Mod: HCNC,CPTII,S$GLB, | Performed by: INTERNAL MEDICINE

## 2021-10-12 PROCEDURE — 1125F AMNT PAIN NOTED PAIN PRSNT: CPT | Mod: HCNC,CPTII,S$GLB, | Performed by: INTERNAL MEDICINE

## 2021-10-12 PROCEDURE — 1101F PT FALLS ASSESS-DOCD LE1/YR: CPT | Mod: HCNC,CPTII,S$GLB, | Performed by: INTERNAL MEDICINE

## 2021-10-12 PROCEDURE — 3072F PR LOW RISK FOR RETINOPATHY: ICD-10-PCS | Mod: HCNC,CPTII,S$GLB, | Performed by: INTERNAL MEDICINE

## 2021-10-12 PROCEDURE — 3288F FALL RISK ASSESSMENT DOCD: CPT | Mod: HCNC,CPTII,S$GLB, | Performed by: INTERNAL MEDICINE

## 2021-10-13 ENCOUNTER — TELEPHONE (OUTPATIENT)
Dept: NEUROLOGY | Facility: CLINIC | Age: 81
End: 2021-10-13

## 2021-10-14 ENCOUNTER — TELEPHONE (OUTPATIENT)
Dept: ENDOSCOPY | Facility: HOSPITAL | Age: 81
End: 2021-10-14

## 2021-10-21 ENCOUNTER — OFFICE VISIT (OUTPATIENT)
Dept: PODIATRY | Facility: CLINIC | Age: 81
End: 2021-10-21
Payer: MEDICARE

## 2021-10-21 ENCOUNTER — TELEPHONE (OUTPATIENT)
Dept: ADMINISTRATIVE | Facility: OTHER | Age: 81
End: 2021-10-21

## 2021-10-21 VITALS
WEIGHT: 223.31 LBS | BODY MASS INDEX: 33.84 KG/M2 | HEART RATE: 81 BPM | HEIGHT: 68 IN | DIASTOLIC BLOOD PRESSURE: 83 MMHG | SYSTOLIC BLOOD PRESSURE: 185 MMHG

## 2021-10-21 DIAGNOSIS — R26.9 ABNORMALITY OF GAIT AND MOBILITY: ICD-10-CM

## 2021-10-21 DIAGNOSIS — E11.42 TYPE 2 DIABETES MELLITUS WITH DIABETIC POLYNEUROPATHY, WITHOUT LONG-TERM CURRENT USE OF INSULIN: Chronic | ICD-10-CM

## 2021-10-21 DIAGNOSIS — G89.29 OTHER CHRONIC PAIN: Primary | ICD-10-CM

## 2021-10-21 DIAGNOSIS — E11.42 DIABETIC POLYNEUROPATHY ASSOCIATED WITH TYPE 2 DIABETES MELLITUS: ICD-10-CM

## 2021-10-21 DIAGNOSIS — L84 CORN OR CALLUS: ICD-10-CM

## 2021-10-21 DIAGNOSIS — B35.1 ONYCHOMYCOSIS DUE TO DERMATOPHYTE: ICD-10-CM

## 2021-10-21 PROCEDURE — 3072F PR LOW RISK FOR RETINOPATHY: ICD-10-PCS | Mod: HCNC,CPTII,S$GLB, | Performed by: PODIATRIST

## 2021-10-21 PROCEDURE — 99999 PR PBB SHADOW E&M-EST. PATIENT-LVL V: CPT | Mod: PBBFAC,HCNC,, | Performed by: PODIATRIST

## 2021-10-21 PROCEDURE — 3079F DIAST BP 80-89 MM HG: CPT | Mod: HCNC,CPTII,S$GLB, | Performed by: PODIATRIST

## 2021-10-21 PROCEDURE — 1159F MED LIST DOCD IN RCRD: CPT | Mod: HCNC,CPTII,S$GLB, | Performed by: PODIATRIST

## 2021-10-21 PROCEDURE — 1160F PR REVIEW ALL MEDS BY PRESCRIBER/CLIN PHARMACIST DOCUMENTED: ICD-10-PCS | Mod: HCNC,CPTII,S$GLB, | Performed by: PODIATRIST

## 2021-10-21 PROCEDURE — 3077F PR MOST RECENT SYSTOLIC BLOOD PRESSURE >= 140 MM HG: ICD-10-PCS | Mod: HCNC,CPTII,S$GLB, | Performed by: PODIATRIST

## 2021-10-21 PROCEDURE — 1126F PR PAIN SEVERITY QUANTIFIED, NO PAIN PRESENT: ICD-10-PCS | Mod: HCNC,CPTII,S$GLB, | Performed by: PODIATRIST

## 2021-10-21 PROCEDURE — 3077F SYST BP >= 140 MM HG: CPT | Mod: HCNC,CPTII,S$GLB, | Performed by: PODIATRIST

## 2021-10-21 PROCEDURE — 11056 ROUTINE FOOT CARE: ICD-10-PCS | Mod: Q9,HCNC,S$GLB, | Performed by: PODIATRIST

## 2021-10-21 PROCEDURE — 3052F HG A1C>EQUAL 8.0%<EQUAL 9.0%: CPT | Mod: HCNC,CPTII,S$GLB, | Performed by: PODIATRIST

## 2021-10-21 PROCEDURE — 99213 OFFICE O/P EST LOW 20 MIN: CPT | Mod: 25,HCNC,S$GLB, | Performed by: PODIATRIST

## 2021-10-21 PROCEDURE — 3072F LOW RISK FOR RETINOPATHY: CPT | Mod: HCNC,CPTII,S$GLB, | Performed by: PODIATRIST

## 2021-10-21 PROCEDURE — 1159F PR MEDICATION LIST DOCUMENTED IN MEDICAL RECORD: ICD-10-PCS | Mod: HCNC,CPTII,S$GLB, | Performed by: PODIATRIST

## 2021-10-21 PROCEDURE — 1126F AMNT PAIN NOTED NONE PRSNT: CPT | Mod: HCNC,CPTII,S$GLB, | Performed by: PODIATRIST

## 2021-10-21 PROCEDURE — 99999 PR PBB SHADOW E&M-EST. PATIENT-LVL V: ICD-10-PCS | Mod: PBBFAC,HCNC,, | Performed by: PODIATRIST

## 2021-10-21 PROCEDURE — 1160F RVW MEDS BY RX/DR IN RCRD: CPT | Mod: HCNC,CPTII,S$GLB, | Performed by: PODIATRIST

## 2021-10-21 PROCEDURE — 11721 ROUTINE FOOT CARE: ICD-10-PCS | Mod: 59,Q9,HCNC,S$GLB | Performed by: PODIATRIST

## 2021-10-21 PROCEDURE — 11056 PARNG/CUTG B9 HYPRKR LES 2-4: CPT | Mod: Q9,HCNC,S$GLB, | Performed by: PODIATRIST

## 2021-10-21 PROCEDURE — 3079F PR MOST RECENT DIASTOLIC BLOOD PRESSURE 80-89 MM HG: ICD-10-PCS | Mod: HCNC,CPTII,S$GLB, | Performed by: PODIATRIST

## 2021-10-21 PROCEDURE — 3288F PR FALLS RISK ASSESSMENT DOCUMENTED: ICD-10-PCS | Mod: HCNC,CPTII,S$GLB, | Performed by: PODIATRIST

## 2021-10-21 PROCEDURE — 11721 DEBRIDE NAIL 6 OR MORE: CPT | Mod: 59,Q9,HCNC,S$GLB | Performed by: PODIATRIST

## 2021-10-21 PROCEDURE — 3288F FALL RISK ASSESSMENT DOCD: CPT | Mod: HCNC,CPTII,S$GLB, | Performed by: PODIATRIST

## 2021-10-21 PROCEDURE — 1101F PT FALLS ASSESS-DOCD LE1/YR: CPT | Mod: HCNC,CPTII,S$GLB, | Performed by: PODIATRIST

## 2021-10-21 PROCEDURE — 1101F PR PT FALLS ASSESS DOC 0-1 FALLS W/OUT INJ PAST YR: ICD-10-PCS | Mod: HCNC,CPTII,S$GLB, | Performed by: PODIATRIST

## 2021-10-21 PROCEDURE — 99213 PR OFFICE/OUTPT VISIT, EST, LEVL III, 20-29 MIN: ICD-10-PCS | Mod: 25,HCNC,S$GLB, | Performed by: PODIATRIST

## 2021-10-21 PROCEDURE — 3052F PR MOST RECENT HEMOGLOBIN A1C LEVEL 8.0 - < 9.0%: ICD-10-PCS | Mod: HCNC,CPTII,S$GLB, | Performed by: PODIATRIST

## 2021-10-27 ENCOUNTER — OFFICE VISIT (OUTPATIENT)
Dept: PAIN MEDICINE | Facility: OTHER | Age: 81
End: 2021-10-27
Attending: PODIATRIST
Payer: MEDICARE

## 2021-10-27 DIAGNOSIS — G89.29 OTHER CHRONIC PAIN: ICD-10-CM

## 2021-10-27 DIAGNOSIS — R26.9 ABNORMALITY OF GAIT AND MOBILITY: ICD-10-CM

## 2021-10-27 DIAGNOSIS — E11.42 DIABETIC POLYNEUROPATHY ASSOCIATED WITH TYPE 2 DIABETES MELLITUS: ICD-10-CM

## 2021-10-27 PROCEDURE — 1160F PR REVIEW ALL MEDS BY PRESCRIBER/CLIN PHARMACIST DOCUMENTED: ICD-10-PCS | Mod: HCNC,CPTII,, | Performed by: NURSE PRACTITIONER

## 2021-10-27 PROCEDURE — 1160F RVW MEDS BY RX/DR IN RCRD: CPT | Mod: HCNC,CPTII,, | Performed by: NURSE PRACTITIONER

## 2021-10-27 PROCEDURE — 1159F MED LIST DOCD IN RCRD: CPT | Mod: HCNC,CPTII,, | Performed by: NURSE PRACTITIONER

## 2021-10-27 PROCEDURE — 99215 OFFICE O/P EST HI 40 MIN: CPT | Mod: HCNC,,, | Performed by: NURSE PRACTITIONER

## 2021-10-27 PROCEDURE — 99215 PR OFFICE/OUTPT VISIT, EST, LEVL V, 40-54 MIN: ICD-10-PCS | Mod: HCNC,,, | Performed by: NURSE PRACTITIONER

## 2021-10-27 PROCEDURE — 1159F PR MEDICATION LIST DOCUMENTED IN MEDICAL RECORD: ICD-10-PCS | Mod: HCNC,CPTII,, | Performed by: NURSE PRACTITIONER

## 2021-10-27 PROCEDURE — 3052F PR MOST RECENT HEMOGLOBIN A1C LEVEL 8.0 - < 9.0%: ICD-10-PCS | Mod: HCNC,CPTII,, | Performed by: NURSE PRACTITIONER

## 2021-10-27 PROCEDURE — 3072F LOW RISK FOR RETINOPATHY: CPT | Mod: HCNC,CPTII,, | Performed by: NURSE PRACTITIONER

## 2021-10-27 PROCEDURE — 3072F PR LOW RISK FOR RETINOPATHY: ICD-10-PCS | Mod: HCNC,CPTII,, | Performed by: NURSE PRACTITIONER

## 2021-10-27 PROCEDURE — 3052F HG A1C>EQUAL 8.0%<EQUAL 9.0%: CPT | Mod: HCNC,CPTII,, | Performed by: NURSE PRACTITIONER

## 2021-10-28 ENCOUNTER — OFFICE VISIT (OUTPATIENT)
Dept: GASTROENTEROLOGY | Facility: CLINIC | Age: 81
End: 2021-10-28
Payer: MEDICARE

## 2021-10-28 VITALS
WEIGHT: 223.31 LBS | HEART RATE: 76 BPM | HEIGHT: 68 IN | DIASTOLIC BLOOD PRESSURE: 79 MMHG | BODY MASS INDEX: 33.84 KG/M2 | SYSTOLIC BLOOD PRESSURE: 162 MMHG

## 2021-10-28 DIAGNOSIS — R10.9 ABDOMINAL PAIN, UNSPECIFIED ABDOMINAL LOCATION: Primary | ICD-10-CM

## 2021-10-28 PROCEDURE — 99999 PR PBB SHADOW E&M-EST. PATIENT-LVL IV: CPT | Mod: PBBFAC,HCNC,GC, | Performed by: STUDENT IN AN ORGANIZED HEALTH CARE EDUCATION/TRAINING PROGRAM

## 2021-10-28 PROCEDURE — 99212 OFFICE O/P EST SF 10 MIN: CPT | Mod: HCNC,GC,S$GLB, | Performed by: STUDENT IN AN ORGANIZED HEALTH CARE EDUCATION/TRAINING PROGRAM

## 2021-10-28 PROCEDURE — 99999 PR PBB SHADOW E&M-EST. PATIENT-LVL IV: ICD-10-PCS | Mod: PBBFAC,HCNC,GC, | Performed by: STUDENT IN AN ORGANIZED HEALTH CARE EDUCATION/TRAINING PROGRAM

## 2021-10-28 PROCEDURE — 99212 PR OFFICE/OUTPT VISIT, EST, LEVL II, 10-19 MIN: ICD-10-PCS | Mod: HCNC,GC,S$GLB, | Performed by: STUDENT IN AN ORGANIZED HEALTH CARE EDUCATION/TRAINING PROGRAM

## 2021-10-28 RX ORDER — SODIUM, POTASSIUM,MAG SULFATES 17.5-3.13G
1 SOLUTION, RECONSTITUTED, ORAL ORAL DAILY
Qty: 1 KIT | Refills: 0 | Status: CANCELLED | OUTPATIENT
Start: 2021-10-28 | End: 2021-10-30

## 2021-10-29 ENCOUNTER — TELEPHONE (OUTPATIENT)
Dept: CARDIOLOGY | Facility: CLINIC | Age: 81
End: 2021-10-29
Payer: MEDICARE

## 2021-11-01 DIAGNOSIS — G89.4 CHRONIC PAIN DISORDER: ICD-10-CM

## 2021-11-01 DIAGNOSIS — R26.9 ABNORMALITY OF GAIT AND MOBILITY: Primary | ICD-10-CM

## 2021-11-01 DIAGNOSIS — Z78.9 DECREASED ACTIVITIES OF DAILY LIVING (ADL): ICD-10-CM

## 2021-11-03 ENCOUNTER — OFFICE VISIT (OUTPATIENT)
Dept: NEUROLOGY | Facility: CLINIC | Age: 81
End: 2021-11-03
Payer: MEDICARE

## 2021-11-03 ENCOUNTER — TELEPHONE (OUTPATIENT)
Dept: ENDOSCOPY | Facility: HOSPITAL | Age: 81
End: 2021-11-03
Payer: MEDICARE

## 2021-11-03 VITALS
HEART RATE: 76 BPM | DIASTOLIC BLOOD PRESSURE: 67 MMHG | BODY MASS INDEX: 33.87 KG/M2 | WEIGHT: 222.75 LBS | SYSTOLIC BLOOD PRESSURE: 146 MMHG

## 2021-11-03 DIAGNOSIS — M48.07 STENOSIS OF LUMBOSACRAL SPINE: Primary | ICD-10-CM

## 2021-11-03 DIAGNOSIS — M79.2 NEUROPATHIC PAIN: ICD-10-CM

## 2021-11-03 PROCEDURE — 3078F PR MOST RECENT DIASTOLIC BLOOD PRESSURE < 80 MM HG: ICD-10-PCS | Mod: HCNC,CPTII,S$GLB, | Performed by: PSYCHIATRY & NEUROLOGY

## 2021-11-03 PROCEDURE — 3072F PR LOW RISK FOR RETINOPATHY: ICD-10-PCS | Mod: HCNC,CPTII,S$GLB, | Performed by: PSYCHIATRY & NEUROLOGY

## 2021-11-03 PROCEDURE — 1100F PR PT FALLS ASSESS DOC 2+ FALLS/FALL W/INJURY/YR: ICD-10-PCS | Mod: HCNC,CPTII,S$GLB, | Performed by: PSYCHIATRY & NEUROLOGY

## 2021-11-03 PROCEDURE — 1125F PR PAIN SEVERITY QUANTIFIED, PAIN PRESENT: ICD-10-PCS | Mod: HCNC,CPTII,S$GLB, | Performed by: PSYCHIATRY & NEUROLOGY

## 2021-11-03 PROCEDURE — 3078F DIAST BP <80 MM HG: CPT | Mod: HCNC,CPTII,S$GLB, | Performed by: PSYCHIATRY & NEUROLOGY

## 2021-11-03 PROCEDURE — 3288F FALL RISK ASSESSMENT DOCD: CPT | Mod: HCNC,CPTII,S$GLB, | Performed by: PSYCHIATRY & NEUROLOGY

## 2021-11-03 PROCEDURE — 99214 OFFICE O/P EST MOD 30 MIN: CPT | Mod: HCNC,S$GLB,, | Performed by: PSYCHIATRY & NEUROLOGY

## 2021-11-03 PROCEDURE — 3072F LOW RISK FOR RETINOPATHY: CPT | Mod: HCNC,CPTII,S$GLB, | Performed by: PSYCHIATRY & NEUROLOGY

## 2021-11-03 PROCEDURE — 99999 PR PBB SHADOW E&M-EST. PATIENT-LVL III: ICD-10-PCS | Mod: PBBFAC,HCNC,, | Performed by: PSYCHIATRY & NEUROLOGY

## 2021-11-03 PROCEDURE — 3288F PR FALLS RISK ASSESSMENT DOCUMENTED: ICD-10-PCS | Mod: HCNC,CPTII,S$GLB, | Performed by: PSYCHIATRY & NEUROLOGY

## 2021-11-03 PROCEDURE — 99999 PR PBB SHADOW E&M-EST. PATIENT-LVL III: CPT | Mod: PBBFAC,HCNC,, | Performed by: PSYCHIATRY & NEUROLOGY

## 2021-11-03 PROCEDURE — 99214 PR OFFICE/OUTPT VISIT, EST, LEVL IV, 30-39 MIN: ICD-10-PCS | Mod: HCNC,S$GLB,, | Performed by: PSYCHIATRY & NEUROLOGY

## 2021-11-03 PROCEDURE — 3077F SYST BP >= 140 MM HG: CPT | Mod: HCNC,CPTII,S$GLB, | Performed by: PSYCHIATRY & NEUROLOGY

## 2021-11-03 PROCEDURE — 3077F PR MOST RECENT SYSTOLIC BLOOD PRESSURE >= 140 MM HG: ICD-10-PCS | Mod: HCNC,CPTII,S$GLB, | Performed by: PSYCHIATRY & NEUROLOGY

## 2021-11-03 PROCEDURE — 1125F AMNT PAIN NOTED PAIN PRSNT: CPT | Mod: HCNC,CPTII,S$GLB, | Performed by: PSYCHIATRY & NEUROLOGY

## 2021-11-03 PROCEDURE — 1100F PTFALLS ASSESS-DOCD GE2>/YR: CPT | Mod: HCNC,CPTII,S$GLB, | Performed by: PSYCHIATRY & NEUROLOGY

## 2021-11-04 ENCOUNTER — TELEPHONE (OUTPATIENT)
Dept: INTERNAL MEDICINE | Facility: CLINIC | Age: 81
End: 2021-11-04
Payer: MEDICARE

## 2021-11-04 ENCOUNTER — TELEPHONE (OUTPATIENT)
Dept: ENDOSCOPY | Facility: HOSPITAL | Age: 81
End: 2021-11-04
Payer: MEDICARE

## 2021-11-04 RX ORDER — OXYCODONE AND ACETAMINOPHEN 10; 325 MG/1; MG/1
1 TABLET ORAL
Qty: 150 TABLET | Refills: 0 | Status: SHIPPED | OUTPATIENT
Start: 2021-11-04 | End: 2021-11-16 | Stop reason: SDUPTHER

## 2021-11-10 ENCOUNTER — HOSPITAL ENCOUNTER (OUTPATIENT)
Facility: HOSPITAL | Age: 81
Discharge: HOME | End: 2021-11-10
Attending: INTERNAL MEDICINE | Admitting: INTERNAL MEDICINE
Payer: MEDICARE

## 2021-11-10 ENCOUNTER — TELEPHONE (OUTPATIENT)
Dept: INTERNAL MEDICINE | Facility: CLINIC | Age: 81
End: 2021-11-10
Payer: MEDICARE

## 2021-11-10 ENCOUNTER — ANESTHESIA EVENT (OUTPATIENT)
Dept: ENDOSCOPY | Facility: HOSPITAL | Age: 81
End: 2021-11-10
Payer: MEDICARE

## 2021-11-10 ENCOUNTER — ANESTHESIA (OUTPATIENT)
Dept: ENDOSCOPY | Facility: HOSPITAL | Age: 81
End: 2021-11-10
Payer: MEDICARE

## 2021-11-10 VITALS
HEIGHT: 68 IN | WEIGHT: 222.69 LBS | DIASTOLIC BLOOD PRESSURE: 65 MMHG | RESPIRATION RATE: 20 BRPM | OXYGEN SATURATION: 96 % | BODY MASS INDEX: 33.75 KG/M2 | HEART RATE: 66 BPM | TEMPERATURE: 97 F | SYSTOLIC BLOOD PRESSURE: 117 MMHG

## 2021-11-10 DIAGNOSIS — R13.10 DYSPHAGIA: ICD-10-CM

## 2021-11-10 DIAGNOSIS — R13.19 ESOPHAGEAL DYSPHAGIA: Primary | ICD-10-CM

## 2021-11-10 LAB
GLUCOSE SERPL-MCNC: 159 MG/DL (ref 70–110)
POCT GLUCOSE: 159 MG/DL (ref 70–110)

## 2021-11-10 PROCEDURE — 27201012 HC FORCEPS, HOT/COLD, DISP: Mod: HCNC | Performed by: INTERNAL MEDICINE

## 2021-11-10 PROCEDURE — 63600175 PHARM REV CODE 636 W HCPCS: Mod: HCNC | Performed by: NURSE ANESTHETIST, CERTIFIED REGISTERED

## 2021-11-10 PROCEDURE — 43239 EGD BIOPSY SINGLE/MULTIPLE: CPT | Mod: HCNC | Performed by: INTERNAL MEDICINE

## 2021-11-10 PROCEDURE — 43239 EGD BIOPSY SINGLE/MULTIPLE: CPT | Mod: 59,HCNC,, | Performed by: INTERNAL MEDICINE

## 2021-11-10 PROCEDURE — 25000003 PHARM REV CODE 250: Mod: HCNC | Performed by: INTERNAL MEDICINE

## 2021-11-10 PROCEDURE — 37000009 HC ANESTHESIA EA ADD 15 MINS: Mod: HCNC | Performed by: INTERNAL MEDICINE

## 2021-11-10 PROCEDURE — 88305 TISSUE EXAM BY PATHOLOGIST: CPT | Mod: HCNC | Performed by: PATHOLOGY

## 2021-11-10 PROCEDURE — 43249 ESOPH EGD DILATION <30 MM: CPT | Mod: HCNC | Performed by: INTERNAL MEDICINE

## 2021-11-10 PROCEDURE — E9220 PRA ENDO ANESTHESIA: ICD-10-PCS | Mod: HCNC,,, | Performed by: NURSE ANESTHETIST, CERTIFIED REGISTERED

## 2021-11-10 PROCEDURE — 82962 GLUCOSE BLOOD TEST: CPT | Mod: HCNC | Performed by: INTERNAL MEDICINE

## 2021-11-10 PROCEDURE — 43249 ESOPH EGD DILATION <30 MM: CPT | Mod: HCNC,,, | Performed by: INTERNAL MEDICINE

## 2021-11-10 PROCEDURE — E9220 PRA ENDO ANESTHESIA: HCPCS | Mod: HCNC,,, | Performed by: NURSE ANESTHETIST, CERTIFIED REGISTERED

## 2021-11-10 PROCEDURE — C1726 CATH, BAL DIL, NON-VASCULAR: HCPCS | Mod: HCNC | Performed by: INTERNAL MEDICINE

## 2021-11-10 PROCEDURE — 88305 TISSUE EXAM BY PATHOLOGIST: CPT | Mod: 26,HCNC,, | Performed by: PATHOLOGY

## 2021-11-10 PROCEDURE — 88305 TISSUE EXAM BY PATHOLOGIST: ICD-10-PCS | Mod: 26,HCNC,, | Performed by: PATHOLOGY

## 2021-11-10 PROCEDURE — 43239 PR EGD, FLEX, W/BIOPSY, SGL/MULTI: ICD-10-PCS | Mod: 59,HCNC,, | Performed by: INTERNAL MEDICINE

## 2021-11-10 PROCEDURE — 43249 PR EGD, FLEX, W/BALL DILATION, < 30MM: ICD-10-PCS | Mod: HCNC,,, | Performed by: INTERNAL MEDICINE

## 2021-11-10 PROCEDURE — 37000008 HC ANESTHESIA 1ST 15 MINUTES: Mod: HCNC | Performed by: INTERNAL MEDICINE

## 2021-11-10 RX ORDER — PROPOFOL 10 MG/ML
VIAL (ML) INTRAVENOUS CONTINUOUS PRN
Status: DISCONTINUED | OUTPATIENT
Start: 2021-11-10 | End: 2021-11-10

## 2021-11-10 RX ORDER — LIDOCAINE HYDROCHLORIDE 20 MG/ML
INJECTION, SOLUTION EPIDURAL; INFILTRATION; INTRACAUDAL; PERINEURAL
Status: DISCONTINUED | OUTPATIENT
Start: 2021-11-10 | End: 2021-11-10

## 2021-11-10 RX ORDER — SODIUM CHLORIDE 9 MG/ML
INJECTION, SOLUTION INTRAVENOUS CONTINUOUS
Status: DISCONTINUED | OUTPATIENT
Start: 2021-11-10 | End: 2021-11-10 | Stop reason: HOSPADM

## 2021-11-10 RX ORDER — PROPOFOL 10 MG/ML
VIAL (ML) INTRAVENOUS
Status: DISCONTINUED | OUTPATIENT
Start: 2021-11-10 | End: 2021-11-10

## 2021-11-10 RX ADMIN — SODIUM CHLORIDE: 0.9 INJECTION, SOLUTION INTRAVENOUS at 08:11

## 2021-11-10 RX ADMIN — PROPOFOL 50 MG: 10 INJECTION, EMULSION INTRAVENOUS at 08:11

## 2021-11-10 RX ADMIN — Medication 150 MCG/KG/MIN: at 08:11

## 2021-11-10 RX ADMIN — LIDOCAINE HYDROCHLORIDE 100 MG: 20 INJECTION, SOLUTION EPIDURAL; INFILTRATION; INTRACAUDAL; PERINEURAL at 08:11

## 2021-11-11 RX ORDER — ROPINIROLE 0.5 MG/1
TABLET, FILM COATED ORAL
Qty: 90 TABLET | Refills: 1 | Status: SHIPPED | OUTPATIENT
Start: 2021-11-11 | End: 2022-04-28

## 2021-11-12 ENCOUNTER — HOSPITAL ENCOUNTER (OUTPATIENT)
Dept: RADIOLOGY | Facility: HOSPITAL | Age: 81
Discharge: HOME | End: 2021-11-12
Attending: PSYCHIATRY & NEUROLOGY
Payer: MEDICARE

## 2021-11-12 DIAGNOSIS — M48.07 STENOSIS OF LUMBOSACRAL SPINE: ICD-10-CM

## 2021-11-12 DIAGNOSIS — M79.2 NEUROPATHIC PAIN: ICD-10-CM

## 2021-11-12 PROCEDURE — 72148 MRI LUMBAR SPINE W/O DYE: CPT | Mod: 26,HCNC,, | Performed by: RADIOLOGY

## 2021-11-12 PROCEDURE — 72148 MRI LUMBAR SPINE WITHOUT CONTRAST: ICD-10-PCS | Mod: 26,HCNC,, | Performed by: RADIOLOGY

## 2021-11-12 PROCEDURE — 72148 MRI LUMBAR SPINE W/O DYE: CPT | Mod: TC,HCNC

## 2021-11-16 ENCOUNTER — LAB VISIT (OUTPATIENT)
Dept: LAB | Facility: HOSPITAL | Age: 81
End: 2021-11-16
Attending: INTERNAL MEDICINE
Payer: MEDICARE

## 2021-11-16 ENCOUNTER — OFFICE VISIT (OUTPATIENT)
Dept: INTERNAL MEDICINE | Facility: CLINIC | Age: 81
End: 2021-11-16
Payer: MEDICARE

## 2021-11-16 VITALS
OXYGEN SATURATION: 99 % | BODY MASS INDEX: 34.25 KG/M2 | WEIGHT: 226 LBS | SYSTOLIC BLOOD PRESSURE: 136 MMHG | HEIGHT: 68 IN | DIASTOLIC BLOOD PRESSURE: 82 MMHG | HEART RATE: 76 BPM

## 2021-11-16 DIAGNOSIS — Z79.4 TYPE 2 DIABETES MELLITUS WITH STAGE 3A CHRONIC KIDNEY DISEASE, WITH LONG-TERM CURRENT USE OF INSULIN: ICD-10-CM

## 2021-11-16 DIAGNOSIS — G89.29 OTHER CHRONIC BACK PAIN: ICD-10-CM

## 2021-11-16 DIAGNOSIS — M47.819 FACET ARTHROPATHY: ICD-10-CM

## 2021-11-16 DIAGNOSIS — M10.9 GOUT, UNSPECIFIED CAUSE, UNSPECIFIED CHRONICITY, UNSPECIFIED SITE: ICD-10-CM

## 2021-11-16 DIAGNOSIS — N18.31 TYPE 2 DIABETES MELLITUS WITH STAGE 3A CHRONIC KIDNEY DISEASE, WITH LONG-TERM CURRENT USE OF INSULIN: ICD-10-CM

## 2021-11-16 DIAGNOSIS — E11.22 TYPE 2 DIABETES MELLITUS WITH STAGE 3A CHRONIC KIDNEY DISEASE, WITH LONG-TERM CURRENT USE OF INSULIN: ICD-10-CM

## 2021-11-16 DIAGNOSIS — M54.9 OTHER CHRONIC BACK PAIN: ICD-10-CM

## 2021-11-16 DIAGNOSIS — Z00.00 ROUTINE GENERAL MEDICAL EXAMINATION AT A HEALTH CARE FACILITY: Primary | ICD-10-CM

## 2021-11-16 DIAGNOSIS — I10 ESSENTIAL HYPERTENSION: ICD-10-CM

## 2021-11-16 DIAGNOSIS — E78.2 MIXED HYPERLIPIDEMIA: ICD-10-CM

## 2021-11-16 DIAGNOSIS — I50.30 DIASTOLIC CONGESTIVE HEART FAILURE, UNSPECIFIED HF CHRONICITY: ICD-10-CM

## 2021-11-16 DIAGNOSIS — K21.9 GASTROESOPHAGEAL REFLUX DISEASE WITHOUT ESOPHAGITIS: ICD-10-CM

## 2021-11-16 LAB
ALBUMIN SERPL BCP-MCNC: 3.3 G/DL (ref 3.5–5.2)
ALP SERPL-CCNC: 84 U/L (ref 55–135)
ALT SERPL W/O P-5'-P-CCNC: 11 U/L (ref 10–44)
ANION GAP SERPL CALC-SCNC: 12 MMOL/L (ref 8–16)
AST SERPL-CCNC: 18 U/L (ref 10–40)
BASOPHILS # BLD AUTO: 0.07 K/UL (ref 0–0.2)
BASOPHILS NFR BLD: 0.7 % (ref 0–1.9)
BILIRUB SERPL-MCNC: 0.4 MG/DL (ref 0.1–1)
BUN SERPL-MCNC: 15 MG/DL (ref 8–23)
CALCIUM SERPL-MCNC: 9.3 MG/DL (ref 8.7–10.5)
CHLORIDE SERPL-SCNC: 98 MMOL/L (ref 95–110)
CO2 SERPL-SCNC: 31 MMOL/L (ref 23–29)
CREAT SERPL-MCNC: 1.1 MG/DL (ref 0.5–1.4)
DIFFERENTIAL METHOD: ABNORMAL
EOSINOPHIL # BLD AUTO: 0.3 K/UL (ref 0–0.5)
EOSINOPHIL NFR BLD: 2.6 % (ref 0–8)
ERYTHROCYTE [DISTWIDTH] IN BLOOD BY AUTOMATED COUNT: 14 % (ref 11.5–14.5)
EST. GFR  (AFRICAN AMERICAN): 54.4 ML/MIN/1.73 M^2
EST. GFR  (NON AFRICAN AMERICAN): 47.2 ML/MIN/1.73 M^2
ESTIMATED AVG GLUCOSE: 232 MG/DL (ref 68–131)
GLUCOSE SERPL-MCNC: 286 MG/DL (ref 70–110)
HBA1C MFR BLD: 9.7 % (ref 4–5.6)
HCT VFR BLD AUTO: 35.4 % (ref 37–48.5)
HGB BLD-MCNC: 11.2 G/DL (ref 12–16)
IMM GRANULOCYTES # BLD AUTO: 0.06 K/UL (ref 0–0.04)
IMM GRANULOCYTES NFR BLD AUTO: 0.6 % (ref 0–0.5)
LYMPHOCYTES # BLD AUTO: 2.6 K/UL (ref 1–4.8)
LYMPHOCYTES NFR BLD: 26 % (ref 18–48)
MCH RBC QN AUTO: 28.5 PG (ref 27–31)
MCHC RBC AUTO-ENTMCNC: 31.6 G/DL (ref 32–36)
MCV RBC AUTO: 90 FL (ref 82–98)
MONOCYTES # BLD AUTO: 1 K/UL (ref 0.3–1)
MONOCYTES NFR BLD: 10.3 % (ref 4–15)
NEUTROPHILS # BLD AUTO: 6 K/UL (ref 1.8–7.7)
NEUTROPHILS NFR BLD: 59.8 % (ref 38–73)
NRBC BLD-RTO: 0 /100 WBC
PLATELET # BLD AUTO: 267 K/UL (ref 150–450)
PMV BLD AUTO: 10.7 FL (ref 9.2–12.9)
POTASSIUM SERPL-SCNC: 3.5 MMOL/L (ref 3.5–5.1)
PROT SERPL-MCNC: 7.3 G/DL (ref 6–8.4)
RBC # BLD AUTO: 3.93 M/UL (ref 4–5.4)
SODIUM SERPL-SCNC: 141 MMOL/L (ref 136–145)
WBC # BLD AUTO: 10.01 K/UL (ref 3.9–12.7)

## 2021-11-16 PROCEDURE — 1159F PR MEDICATION LIST DOCUMENTED IN MEDICAL RECORD: ICD-10-PCS | Mod: HCNC,CPTII,S$GLB, | Performed by: INTERNAL MEDICINE

## 2021-11-16 PROCEDURE — 3052F PR MOST RECENT HEMOGLOBIN A1C LEVEL 8.0 - < 9.0%: ICD-10-PCS | Mod: HCNC,CPTII,S$GLB, | Performed by: INTERNAL MEDICINE

## 2021-11-16 PROCEDURE — 3288F PR FALLS RISK ASSESSMENT DOCUMENTED: ICD-10-PCS | Mod: HCNC,CPTII,S$GLB, | Performed by: INTERNAL MEDICINE

## 2021-11-16 PROCEDURE — 80053 COMPREHEN METABOLIC PANEL: CPT | Mod: HCNC | Performed by: INTERNAL MEDICINE

## 2021-11-16 PROCEDURE — 99999 PR PBB SHADOW E&M-EST. PATIENT-LVL IV: ICD-10-PCS | Mod: PBBFAC,HCNC,, | Performed by: INTERNAL MEDICINE

## 2021-11-16 PROCEDURE — 1126F AMNT PAIN NOTED NONE PRSNT: CPT | Mod: HCNC,CPTII,S$GLB, | Performed by: INTERNAL MEDICINE

## 2021-11-16 PROCEDURE — 1126F PR PAIN SEVERITY QUANTIFIED, NO PAIN PRESENT: ICD-10-PCS | Mod: HCNC,CPTII,S$GLB, | Performed by: INTERNAL MEDICINE

## 2021-11-16 PROCEDURE — 99397 PER PM REEVAL EST PAT 65+ YR: CPT | Mod: HCNC,S$GLB,, | Performed by: INTERNAL MEDICINE

## 2021-11-16 PROCEDURE — 1101F PR PT FALLS ASSESS DOC 0-1 FALLS W/OUT INJ PAST YR: ICD-10-PCS | Mod: HCNC,CPTII,S$GLB, | Performed by: INTERNAL MEDICINE

## 2021-11-16 PROCEDURE — 1159F MED LIST DOCD IN RCRD: CPT | Mod: HCNC,CPTII,S$GLB, | Performed by: INTERNAL MEDICINE

## 2021-11-16 PROCEDURE — 99999 PR PBB SHADOW E&M-EST. PATIENT-LVL IV: CPT | Mod: PBBFAC,HCNC,, | Performed by: INTERNAL MEDICINE

## 2021-11-16 PROCEDURE — 83036 HEMOGLOBIN GLYCOSYLATED A1C: CPT | Mod: HCNC | Performed by: INTERNAL MEDICINE

## 2021-11-16 PROCEDURE — 3288F FALL RISK ASSESSMENT DOCD: CPT | Mod: HCNC,CPTII,S$GLB, | Performed by: INTERNAL MEDICINE

## 2021-11-16 PROCEDURE — 3079F PR MOST RECENT DIASTOLIC BLOOD PRESSURE 80-89 MM HG: ICD-10-PCS | Mod: HCNC,CPTII,S$GLB, | Performed by: INTERNAL MEDICINE

## 2021-11-16 PROCEDURE — 3075F SYST BP GE 130 - 139MM HG: CPT | Mod: HCNC,CPTII,S$GLB, | Performed by: INTERNAL MEDICINE

## 2021-11-16 PROCEDURE — 1101F PT FALLS ASSESS-DOCD LE1/YR: CPT | Mod: HCNC,CPTII,S$GLB, | Performed by: INTERNAL MEDICINE

## 2021-11-16 PROCEDURE — 99397 PR PREVENTIVE VISIT,EST,65 & OVER: ICD-10-PCS | Mod: HCNC,S$GLB,, | Performed by: INTERNAL MEDICINE

## 2021-11-16 PROCEDURE — 3079F DIAST BP 80-89 MM HG: CPT | Mod: HCNC,CPTII,S$GLB, | Performed by: INTERNAL MEDICINE

## 2021-11-16 PROCEDURE — 3072F LOW RISK FOR RETINOPATHY: CPT | Mod: HCNC,CPTII,S$GLB, | Performed by: INTERNAL MEDICINE

## 2021-11-16 PROCEDURE — 3052F HG A1C>EQUAL 8.0%<EQUAL 9.0%: CPT | Mod: HCNC,CPTII,S$GLB, | Performed by: INTERNAL MEDICINE

## 2021-11-16 PROCEDURE — 85025 COMPLETE CBC W/AUTO DIFF WBC: CPT | Mod: HCNC | Performed by: INTERNAL MEDICINE

## 2021-11-16 PROCEDURE — 36415 COLL VENOUS BLD VENIPUNCTURE: CPT | Mod: HCNC | Performed by: INTERNAL MEDICINE

## 2021-11-16 PROCEDURE — 3072F PR LOW RISK FOR RETINOPATHY: ICD-10-PCS | Mod: HCNC,CPTII,S$GLB, | Performed by: INTERNAL MEDICINE

## 2021-11-16 PROCEDURE — 3075F PR MOST RECENT SYSTOLIC BLOOD PRESS GE 130-139MM HG: ICD-10-PCS | Mod: HCNC,CPTII,S$GLB, | Performed by: INTERNAL MEDICINE

## 2021-11-16 RX ORDER — OXYCODONE AND ACETAMINOPHEN 10; 325 MG/1; MG/1
1 TABLET ORAL
Qty: 150 TABLET | Refills: 0 | Status: SHIPPED | OUTPATIENT
Start: 2021-12-10 | End: 2022-01-03 | Stop reason: SDUPTHER

## 2021-11-18 LAB
FINAL PATHOLOGIC DIAGNOSIS: NORMAL
Lab: NORMAL

## 2021-11-18 RX ORDER — ALLOPURINOL 100 MG/1
100 TABLET ORAL DAILY
Qty: 90 TABLET | Refills: 3 | Status: SHIPPED | OUTPATIENT
Start: 2021-11-18 | End: 2022-08-31

## 2021-11-19 ENCOUNTER — TELEPHONE (OUTPATIENT)
Dept: GASTROENTEROLOGY | Facility: CLINIC | Age: 81
End: 2021-11-19
Payer: MEDICARE

## 2021-11-22 ENCOUNTER — IMMUNIZATION (OUTPATIENT)
Dept: INTERNAL MEDICINE | Facility: CLINIC | Age: 81
End: 2021-11-22
Payer: MEDICARE

## 2021-11-22 DIAGNOSIS — Z23 NEED FOR VACCINATION: Primary | ICD-10-CM

## 2021-11-22 PROCEDURE — 0004A COVID-19, MRNA, LNP-S, PF, 30 MCG/0.3 ML DOSE VACCINE: CPT | Mod: HCNC,PBBFAC | Performed by: INTERNAL MEDICINE

## 2021-12-01 ENCOUNTER — TELEPHONE (OUTPATIENT)
Dept: INTERNAL MEDICINE | Facility: CLINIC | Age: 81
End: 2021-12-01
Payer: MEDICARE

## 2021-12-07 ENCOUNTER — CLINICAL SUPPORT (OUTPATIENT)
Dept: REHABILITATION | Facility: OTHER | Age: 81
End: 2021-12-07
Payer: MEDICARE

## 2021-12-07 DIAGNOSIS — R26.9 ABNORMALITY OF GAIT AND MOBILITY: ICD-10-CM

## 2021-12-07 DIAGNOSIS — G89.4 CHRONIC PAIN DISORDER: ICD-10-CM

## 2021-12-07 DIAGNOSIS — Z74.09 IMPAIRED FUNCTIONAL MOBILITY, BALANCE, GAIT, AND ENDURANCE: Primary | ICD-10-CM

## 2021-12-07 DIAGNOSIS — Z78.9 ALTERATION IN PERFORMANCE OF ACTIVITIES OF DAILY LIVING: ICD-10-CM

## 2021-12-07 DIAGNOSIS — Z78.9 DECREASED ACTIVITIES OF DAILY LIVING (ADL): ICD-10-CM

## 2021-12-07 DIAGNOSIS — Z74.09 IMPAIRED FUNCTIONAL MOBILITY AND ACTIVITY TOLERANCE: ICD-10-CM

## 2021-12-07 DIAGNOSIS — G89.29 OTHER CHRONIC BACK PAIN: ICD-10-CM

## 2021-12-07 DIAGNOSIS — M54.9 OTHER CHRONIC BACK PAIN: ICD-10-CM

## 2021-12-07 PROCEDURE — 97163 PT EVAL HIGH COMPLEX 45 MIN: CPT | Mod: HCNC

## 2021-12-07 PROCEDURE — 97530 THERAPEUTIC ACTIVITIES: CPT | Mod: HCNC

## 2021-12-07 PROCEDURE — 97167 OT EVAL HIGH COMPLEX 60 MIN: CPT | Mod: HCNC

## 2021-12-15 ENCOUNTER — CLINICAL SUPPORT (OUTPATIENT)
Dept: REHABILITATION | Facility: OTHER | Age: 81
End: 2021-12-15
Payer: MEDICARE

## 2021-12-15 DIAGNOSIS — G89.4 CHRONIC PAIN DISORDER: ICD-10-CM

## 2021-12-15 DIAGNOSIS — G89.29 OTHER CHRONIC BACK PAIN: ICD-10-CM

## 2021-12-15 DIAGNOSIS — Z78.9 DECREASED ACTIVITIES OF DAILY LIVING (ADL): ICD-10-CM

## 2021-12-15 DIAGNOSIS — M54.9 OTHER CHRONIC BACK PAIN: ICD-10-CM

## 2021-12-15 DIAGNOSIS — Z74.09 IMPAIRED FUNCTIONAL MOBILITY, BALANCE, GAIT, AND ENDURANCE: ICD-10-CM

## 2021-12-15 DIAGNOSIS — R26.9 ABNORMALITY OF GAIT AND MOBILITY: ICD-10-CM

## 2021-12-15 PROCEDURE — 97110 THERAPEUTIC EXERCISES: CPT | Mod: HCNC

## 2021-12-17 ENCOUNTER — CLINICAL SUPPORT (OUTPATIENT)
Dept: REHABILITATION | Facility: OTHER | Age: 81
End: 2021-12-17
Payer: MEDICARE

## 2021-12-17 DIAGNOSIS — Z74.09 IMPAIRED FUNCTIONAL MOBILITY AND ACTIVITY TOLERANCE: ICD-10-CM

## 2021-12-17 DIAGNOSIS — M54.9 OTHER CHRONIC BACK PAIN: ICD-10-CM

## 2021-12-17 DIAGNOSIS — Z78.9 DECREASED ACTIVITIES OF DAILY LIVING (ADL): ICD-10-CM

## 2021-12-17 DIAGNOSIS — G89.29 OTHER CHRONIC BACK PAIN: ICD-10-CM

## 2021-12-17 DIAGNOSIS — Z78.9 ALTERATION IN PERFORMANCE OF ACTIVITIES OF DAILY LIVING: ICD-10-CM

## 2021-12-17 DIAGNOSIS — G89.4 CHRONIC PAIN DISORDER: ICD-10-CM

## 2021-12-17 DIAGNOSIS — R26.9 ABNORMALITY OF GAIT AND MOBILITY: ICD-10-CM

## 2021-12-17 DIAGNOSIS — Z74.09 IMPAIRED FUNCTIONAL MOBILITY, BALANCE, GAIT, AND ENDURANCE: ICD-10-CM

## 2021-12-17 PROCEDURE — 97535 SELF CARE MNGMENT TRAINING: CPT | Mod: HCNC

## 2021-12-17 PROCEDURE — 97110 THERAPEUTIC EXERCISES: CPT | Mod: HCNC

## 2021-12-17 PROCEDURE — 97530 THERAPEUTIC ACTIVITIES: CPT | Mod: HCNC

## 2021-12-27 ENCOUNTER — CLINICAL SUPPORT (OUTPATIENT)
Dept: REHABILITATION | Facility: OTHER | Age: 81
End: 2021-12-27
Payer: MEDICARE

## 2021-12-27 DIAGNOSIS — Z74.09 IMPAIRED FUNCTIONAL MOBILITY AND ACTIVITY TOLERANCE: ICD-10-CM

## 2021-12-27 DIAGNOSIS — Z78.9 ALTERATION IN PERFORMANCE OF ACTIVITIES OF DAILY LIVING: ICD-10-CM

## 2021-12-27 PROCEDURE — 97530 THERAPEUTIC ACTIVITIES: CPT | Mod: HCNC

## 2021-12-27 PROCEDURE — 97110 THERAPEUTIC EXERCISES: CPT | Mod: HCNC

## 2021-12-29 ENCOUNTER — NURSE TRIAGE (OUTPATIENT)
Dept: ADMINISTRATIVE | Facility: CLINIC | Age: 81
End: 2021-12-29
Payer: MEDICARE

## 2022-01-03 ENCOUNTER — OFFICE VISIT (OUTPATIENT)
Dept: INTERNAL MEDICINE | Facility: CLINIC | Age: 82
End: 2022-01-03
Payer: MEDICARE

## 2022-01-03 VITALS
HEIGHT: 68 IN | OXYGEN SATURATION: 98 % | SYSTOLIC BLOOD PRESSURE: 100 MMHG | WEIGHT: 221.81 LBS | BODY MASS INDEX: 33.62 KG/M2 | HEART RATE: 78 BPM | DIASTOLIC BLOOD PRESSURE: 60 MMHG

## 2022-01-03 DIAGNOSIS — M48.062 SPINAL STENOSIS OF LUMBAR REGION WITH NEUROGENIC CLAUDICATION: ICD-10-CM

## 2022-01-03 DIAGNOSIS — E11.42 TYPE 2 DIABETES MELLITUS WITH DIABETIC POLYNEUROPATHY, WITH LONG-TERM CURRENT USE OF INSULIN: ICD-10-CM

## 2022-01-03 DIAGNOSIS — I10 ESSENTIAL HYPERTENSION: Primary | Chronic | ICD-10-CM

## 2022-01-03 DIAGNOSIS — F39 MOOD DISORDER: ICD-10-CM

## 2022-01-03 DIAGNOSIS — I25.119 CORONARY ARTERY DISEASE INVOLVING NATIVE CORONARY ARTERY OF NATIVE HEART WITH ANGINA PECTORIS: ICD-10-CM

## 2022-01-03 DIAGNOSIS — E66.9 OBESITY (BMI 30.0-34.9): ICD-10-CM

## 2022-01-03 DIAGNOSIS — Z79.4 TYPE 2 DIABETES MELLITUS WITH DIABETIC POLYNEUROPATHY, WITH LONG-TERM CURRENT USE OF INSULIN: ICD-10-CM

## 2022-01-03 DIAGNOSIS — N18.31 STAGE 3A CHRONIC KIDNEY DISEASE: ICD-10-CM

## 2022-01-03 PROCEDURE — 99214 OFFICE O/P EST MOD 30 MIN: CPT | Mod: S$GLB,,, | Performed by: INTERNAL MEDICINE

## 2022-01-03 PROCEDURE — 99212 OFFICE O/P EST SF 10 MIN: CPT | Mod: PBBFAC | Performed by: INTERNAL MEDICINE

## 2022-01-03 PROCEDURE — 3074F PR MOST RECENT SYSTOLIC BLOOD PRESSURE < 130 MM HG: ICD-10-PCS | Mod: CPTII,S$GLB,, | Performed by: INTERNAL MEDICINE

## 2022-01-03 PROCEDURE — 99214 PR OFFICE/OUTPT VISIT, EST, LEVL IV, 30-39 MIN: ICD-10-PCS | Mod: S$GLB,,, | Performed by: INTERNAL MEDICINE

## 2022-01-03 PROCEDURE — 3078F DIAST BP <80 MM HG: CPT | Mod: CPTII,S$GLB,, | Performed by: INTERNAL MEDICINE

## 2022-01-03 PROCEDURE — 3074F SYST BP LT 130 MM HG: CPT | Mod: CPTII,S$GLB,, | Performed by: INTERNAL MEDICINE

## 2022-01-03 PROCEDURE — 99999 PR PBB SHADOW E&M-EST. PATIENT-LVL II: CPT | Mod: PBBFAC,,, | Performed by: INTERNAL MEDICINE

## 2022-01-03 PROCEDURE — 3078F PR MOST RECENT DIASTOLIC BLOOD PRESSURE < 80 MM HG: ICD-10-PCS | Mod: CPTII,S$GLB,, | Performed by: INTERNAL MEDICINE

## 2022-01-03 PROCEDURE — 99999 PR PBB SHADOW E&M-EST. PATIENT-LVL II: ICD-10-PCS | Mod: PBBFAC,,, | Performed by: INTERNAL MEDICINE

## 2022-01-03 RX ORDER — PANTOPRAZOLE SODIUM 40 MG/1
40 TABLET, DELAYED RELEASE ORAL DAILY
Qty: 90 TABLET | Refills: 4 | Status: SHIPPED | OUTPATIENT
Start: 2022-01-03 | End: 2022-08-23 | Stop reason: SDUPTHER

## 2022-01-03 RX ORDER — OXYCODONE AND ACETAMINOPHEN 10; 325 MG/1; MG/1
1 TABLET ORAL
Qty: 150 TABLET | Refills: 0 | Status: SHIPPED | OUTPATIENT
Start: 2022-01-10 | End: 2022-02-09 | Stop reason: SDUPTHER

## 2022-01-03 NOTE — PROGRESS NOTES
"  CHIEF COMPLAINT:  Follow up of multple problems.      HISTORY OF PRESENT ILLNESS: This is a 81-year-old woman who presents for follow up of above.    She remains off gabapentin.   She states the gabapentin makes her nauseated.  She states I have " pain everywhere - 9/10 "  She states she was in the bed for 2 weeks because her pain was so bad. She also states that her pain is worse when she walks. She has pain in both shoulders,  back pain,  leg pain. She states she takes  oxycodone apap 10/325 one 5 times daily. She states the pain medicaiton improves her medication for a little while.  She is also taking duloxetine 60 mg twice daliy. She take ropinirole 0.5 mg in the evening for restless leg.    She has been doing physical therapy for her back    She states she cannot remember her medicines.      She is drinking ensure twice daily.        Her sister who lives in [place]  early  - age 71.   She had a heart attack and  before the ambulance could get to her.  Mrs Villasenor would speak with this sister every day.  She feels that her mood is doing ok. She is going to [employer] which she enjoys.  Sleep is off.        No fever or chills. She has felt cold.  No nausea, vomiting, constipation, diarrhea, dysuria, hematuira.  She denies swallowing problems today.  She occasionally has pain when she urinates. Vaseline to the vaginal area helps.       She is living alone.  Her grandson has been staying with her. He goes to work very early in the morning. She does not see her grandson very  much     SHe had a bilateral popliteal dvt on ultrasound on 12/15/20 - she is currently taking eliquis 5 mg twice daily..  She will be getting compression stockings for her legs.      She states she thinks she is not taking pantoprazole. NO nausea, vomiting.        She is taking  amlodipine 5 mg daily, coreg 25 mg twice daily and lasix 40 mg 1 tablets twice daily for her hypertension and diastolic dysfunction.  No chest pain, " "palpitations, shortness of breath.       She has gout. She is taking allopurinol 100 mg daily      She saw Endocrinology on 10/22/20. She is taking Lantus 60 units daily and Novolog 24 units once or twice daily with meals.  Blood sugar has been up and down.     PAST MEDICAL HISTORY:   1. Diabetes mellitus   2. Hypertension   3. Hyperlipidemia   4. Left heart catheterization January 2007 which revealed luminal irregularities in the LAD, left circumflex and right coronary artery. She had diastolic dysfunction and patent renal arteries.   5. Sleep apnea   6. Obesity.   7. Nephrolithiasis status post lithotripsy.   8. Reflux - had nonerosive gastropathy on EGD September 2007. Gastritis on EGD 9/2010  9. Hidradenitis suppurativa.   10. History of diverticulitis with a hospitalized October 2007.   11. Migraines   12. Obesity.   13. 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    14. Total hysterectomy in 1969.   15. Cholecystectomy was done at that time as well.   16. 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   17. Colon polyps on colonscopy 11/2010 - due 2013   18. Hospitalization 12/10 for e coli sepsis due to left ureteral stone with left nephrostomy tube in [place], [place]   19. Left knee replacement 9/2012      MEDICATIONS and ALLERGIES: Updated on EPIC.     PHYSICAL EXAMINATION:  /60   Pulse 78   Ht 5' 8" (1.727 m)   Wt 100.6 kg (221 lb 12.5 oz)   LMP  (LMP Unknown)   SpO2 98%   BMI 33.72 kg/m²      GENERAL: She is alert, oriented, no apparent distress. Affect within   normal limits.  Conjunctiva anicteric.    NECK: Supple.   Respiratory: Effort normal. Lungs clear  HEART: Regular rate and rhythm without murmurs, gallops or rubs.   No lower extremity edema.        EGD 11/10/21 - Benign-appearing esophageal stenosis at the GE                          junction, dilated up to 18 mm. Mild erythematous gastropathy, biopsied     MRI lumbar " spine11/12/21 - Multilevel degenerative disc disease, most severe at L4-L5 with moderate spinal canal and bilateral neural foraminal narrowing        ASSESSMENT AND PLAN:      1. Hypertension - BP is low today -decrease furosemide to 40 mg once daily. Bring pill bottles to next vsiit.   1.  OA neck and lumbar spine - in PT  at [facility], [place].   2.  Gout - on allopurinol.   2.  Mood disorder - on duloxetine to 60 mg twice daily  3.  Swallowing disorder - s/p EGD with dilation on 11/11/21.   4. Asthma - stable   7. Gerd -on pantoprazole 40 mg twice daily - refilled  8. Diabetes with neuropathy- watch sugars- discussed diet.   9. Hyperlipidemia - on  lipitor  10. Allergic rhinitis - stable  11. Diastolic congestive heart failure - stable  12. CRI -cut back on furosemdie  13. Obesity - discussed diet, exercise and weight loss  14. CAD -risk factor modification  15. Aortic atherosclerosis and possible mesenteric artery stenosis- risk factor modification  16. Tortuous aorta - HTN controlled  17. Colon polyps - Colonoscopy in 8/2013 - 2 polyps. Flex sig 11/2013 - mild granular mucosa. Colonoscopy 6/2017 diverticulosis - no polyps due 2023.   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 .Restless leg syndrome - on roprinole    20.  Swallowing disorder -denies symptoms today  21. Morbid obestiy - working on diet.   .  MMG 6/21  Prevnar 10/15 and flu shot 11/16  I will see her back in 1 week with all pill bottles sooner if issues. .

## 2022-01-04 ENCOUNTER — DOCUMENTATION ONLY (OUTPATIENT)
Dept: REHABILITATION | Facility: OTHER | Age: 82
End: 2022-01-04
Payer: MEDICARE

## 2022-01-04 NOTE — PROGRESS NOTES
Pt no show for her appt 1/3/2022 & 1/4/2022. PT called pt & left voicemail for her to return call to review her plan of care & the cancellation/noshow policy.    Andrea Kaba, PT

## 2022-01-06 NOTE — TELEPHONE ENCOUNTER
No new care gaps identified.  Powered by [employer]. Reference number: [id number].   1/06/2022 1:46:37 PM CST

## 2022-01-09 RX ORDER — PANTOPRAZOLE SODIUM 40 MG/1
TABLET, DELAYED RELEASE ORAL
Qty: 90 TABLET | Refills: 4 | OUTPATIENT
Start: 2022-01-09

## 2022-01-10 ENCOUNTER — OFFICE VISIT (OUTPATIENT)
Dept: INTERNAL MEDICINE | Facility: CLINIC | Age: 82
End: 2022-01-10
Payer: MEDICARE

## 2022-01-10 VITALS
OXYGEN SATURATION: 98 % | WEIGHT: 220.88 LBS | HEIGHT: 68 IN | SYSTOLIC BLOOD PRESSURE: 110 MMHG | BODY MASS INDEX: 33.48 KG/M2 | DIASTOLIC BLOOD PRESSURE: 84 MMHG | HEART RATE: 76 BPM

## 2022-01-10 DIAGNOSIS — Z79.4 TYPE 2 DIABETES MELLITUS WITH DIABETIC POLYNEUROPATHY, WITH LONG-TERM CURRENT USE OF INSULIN: ICD-10-CM

## 2022-01-10 DIAGNOSIS — E78.00 HYPERCHOLESTEROLEMIA: ICD-10-CM

## 2022-01-10 DIAGNOSIS — E11.42 TYPE 2 DIABETES MELLITUS WITH DIABETIC POLYNEUROPATHY, WITH LONG-TERM CURRENT USE OF INSULIN: ICD-10-CM

## 2022-01-10 DIAGNOSIS — E11.42 DIABETIC POLYNEUROPATHY ASSOCIATED WITH TYPE 2 DIABETES MELLITUS: ICD-10-CM

## 2022-01-10 DIAGNOSIS — I10 ESSENTIAL HYPERTENSION: Chronic | ICD-10-CM

## 2022-01-10 DIAGNOSIS — R07.9 CHEST PAIN, UNSPECIFIED TYPE: ICD-10-CM

## 2022-01-10 DIAGNOSIS — I50.32 CHRONIC DIASTOLIC CONGESTIVE HEART FAILURE: Primary | ICD-10-CM

## 2022-01-10 DIAGNOSIS — I25.119 CORONARY ARTERY DISEASE INVOLVING NATIVE CORONARY ARTERY OF NATIVE HEART WITH ANGINA PECTORIS: ICD-10-CM

## 2022-01-10 PROCEDURE — 3288F PR FALLS RISK ASSESSMENT DOCUMENTED: ICD-10-PCS | Mod: HCNC,CPTII,S$GLB, | Performed by: INTERNAL MEDICINE

## 2022-01-10 PROCEDURE — 3074F SYST BP LT 130 MM HG: CPT | Mod: HCNC,CPTII,S$GLB, | Performed by: INTERNAL MEDICINE

## 2022-01-10 PROCEDURE — 1159F MED LIST DOCD IN RCRD: CPT | Mod: HCNC,CPTII,S$GLB, | Performed by: INTERNAL MEDICINE

## 2022-01-10 PROCEDURE — 1159F PR MEDICATION LIST DOCUMENTED IN MEDICAL RECORD: ICD-10-PCS | Mod: HCNC,CPTII,S$GLB, | Performed by: INTERNAL MEDICINE

## 2022-01-10 PROCEDURE — 1101F PT FALLS ASSESS-DOCD LE1/YR: CPT | Mod: HCNC,CPTII,S$GLB, | Performed by: INTERNAL MEDICINE

## 2022-01-10 PROCEDURE — 99999 PR PBB SHADOW E&M-EST. PATIENT-LVL IV: ICD-10-PCS | Mod: PBBFAC,HCNC,, | Performed by: INTERNAL MEDICINE

## 2022-01-10 PROCEDURE — 3288F FALL RISK ASSESSMENT DOCD: CPT | Mod: HCNC,CPTII,S$GLB, | Performed by: INTERNAL MEDICINE

## 2022-01-10 PROCEDURE — 99999 PR PBB SHADOW E&M-EST. PATIENT-LVL IV: CPT | Mod: PBBFAC,HCNC,, | Performed by: INTERNAL MEDICINE

## 2022-01-10 PROCEDURE — 3079F PR MOST RECENT DIASTOLIC BLOOD PRESSURE 80-89 MM HG: ICD-10-PCS | Mod: HCNC,CPTII,S$GLB, | Performed by: INTERNAL MEDICINE

## 2022-01-10 PROCEDURE — 3079F DIAST BP 80-89 MM HG: CPT | Mod: HCNC,CPTII,S$GLB, | Performed by: INTERNAL MEDICINE

## 2022-01-10 PROCEDURE — 99214 PR OFFICE/OUTPT VISIT, EST, LEVL IV, 30-39 MIN: ICD-10-PCS | Mod: HCNC,S$GLB,, | Performed by: INTERNAL MEDICINE

## 2022-01-10 PROCEDURE — 1101F PR PT FALLS ASSESS DOC 0-1 FALLS W/OUT INJ PAST YR: ICD-10-PCS | Mod: HCNC,CPTII,S$GLB, | Performed by: INTERNAL MEDICINE

## 2022-01-10 PROCEDURE — 3074F PR MOST RECENT SYSTOLIC BLOOD PRESSURE < 130 MM HG: ICD-10-PCS | Mod: HCNC,CPTII,S$GLB, | Performed by: INTERNAL MEDICINE

## 2022-01-10 PROCEDURE — 99214 OFFICE O/P EST MOD 30 MIN: CPT | Mod: HCNC,S$GLB,, | Performed by: INTERNAL MEDICINE

## 2022-01-10 PROCEDURE — 1125F PR PAIN SEVERITY QUANTIFIED, PAIN PRESENT: ICD-10-PCS | Mod: HCNC,CPTII,S$GLB, | Performed by: INTERNAL MEDICINE

## 2022-01-10 PROCEDURE — 1125F AMNT PAIN NOTED PAIN PRSNT: CPT | Mod: HCNC,CPTII,S$GLB, | Performed by: INTERNAL MEDICINE

## 2022-01-10 RX ORDER — INSULIN ASPART 100 [IU]/ML
INJECTION, SOLUTION INTRAVENOUS; SUBCUTANEOUS
Qty: 120 ML | Refills: 3 | Status: SHIPPED | OUTPATIENT
Start: 2022-01-10 | End: 2022-06-09 | Stop reason: SDUPTHER

## 2022-01-10 RX ORDER — ATORVASTATIN CALCIUM 40 MG/1
40 TABLET, FILM COATED ORAL DAILY
Qty: 90 TABLET | Refills: 3 | Status: SHIPPED | OUTPATIENT
Start: 2022-01-10 | End: 2022-04-28

## 2022-01-10 NOTE — PROGRESS NOTES
CHIEF COMPLAINT:  Follow up of multple problems.      HISTORY OF PRESENT ILLNESS: This is a 81-year-old woman who presents for follow up of above.    She brings her pill bottles with her today    Does not have her bottle of oxycodone - she states she is out - due to  a refill today    Amlodipine 5 mg - Rx dated 21 for 90 pillls  Dulozetine 60 mg - Rx dated 2020 - bottle is full (about 90 capsules0  Roprinole 0.5 mg ( cannot read the date on the old bottle)  Loratadine 10 mg (over the counter)  Pantoprazole 40 mg (new bottle dated 1/3/2022  Eliquis 5 mg (Rx dated 10/13/21 for 60 pills with about 30 pills left in the bottle)  Carvedilol 25 mg (Rx dated 8/15/2019 - full bottle)  Allopurinol 100 mg (cannot read date on the old bottle)  Fursemide 40 mg (rx dated 10/4/21 for 180 tablets  Lantus pen  Novolog pen- states this is her last pen    She continues to complain that her whole body  Hurts  She is not as weak since decreasing the furosemide from 40 mg twice daily to once daily.  She does not want to go to physical therapy but will go as she realizes that it will help her pain.         She is drinking ensure twice daily.         Her sister who lives in [place]  early  - age 71.   She had a heart attack and  before the ambulance could get to her.  Mrs Villasenor would speak with this sister every day.  She feels that her mood is doing ok. She is going to [employer] which she enjoys.  f.        No fever or chills. She has felt cold.  No nausea, vomiting, constipation, dysuria, hematuira.  She denies swallowing problems today.  She occasionally has pain when she urinates. Vaseline to the vaginal area helps.  Occasional diarrhea      She is living alone.  Her grandson has been staying with her. He goes to work very early in the morning. She does not see her grandson very  much       She is taking  amlodipine 5 mg daily, coreg 25 mg twice daily and lasix 40 mg 1 tablets twice daily for her  "hypertension and diastolic dysfunction.  No chest pain, palpitations, shortness of breath.       She has gout. She is taking allopurinol 100 mg daily      She saw Endocrinology on 10/22/20. She should take Lantus 60 units daily and Novolog 24 units once or twice daily with meals.  Blood sugar has been up and down.     PAST MEDICAL HISTORY:   1. Diabetes mellitus   2. Hypertension   3. Hyperlipidemia   4. Left heart catheterization January 2007 which revealed luminal irregularities in the LAD, left circumflex and right coronary artery. She had diastolic dysfunction and patent renal arteries.   5. Sleep apnea   6. Obesity.   7. Nephrolithiasis status post lithotripsy.   8. Reflux - had nonerosive gastropathy on EGD September 2007. Gastritis on EGD 9/2010  9. Hidradenitis suppurativa.   10. History of diverticulitis with a hospitalized October 2007.   11. Migraines   12. Obesity.   13. 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    14. Total hysterectomy in 1969.   15. Cholecystectomy was done at that time as well.   16. 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   17. Colon polyps on colonscopy 11/2010 - due 2013   18. Hospitalization 12/10 for e coli sepsis due to left ureteral stone with left nephrostomy tube in [place], [place]   19. Left knee replacement 9/2012      MEDICATIONS and ALLERGIES: Updated on EPIC.     PHYSICAL EXAMINATION:  /84 (BP Location: Right arm, Patient Position: Sitting, BP Method: Large (Manual))   Pulse 76   Ht 5' 8" (1.727 m)   Wt 100.2 kg (220 lb 14.4 oz)   LMP  (LMP Unknown)   SpO2 98%   BMI 33.59 kg/m²       GENERAL: She is alert, oriented, no apparent distress. Affect within   normal limits.  Conjunctiva anicteric.    NECK: Supple.   Respiratory: Effort normal. Lungs clear  HEART: Regular rate and rhythm without murmurs, gallops or rubs.   No lower extremity edema.             ASSESSMENT AND PLAN:      1. " Hypertension with diastolic heart failure- continue current medications  2.  OA neck and lumbar spine - in PT  at [facility]. Continue physical therapy  3.  Gout - on allopurinol.   4.  Mood disorder - get back on duloxetine  5.  Swallowing disorder - s/p EGD with dilation on 11/11/21.   6. Asthma - stable   7. Gerd -on pantoprazole 40 mg twice daily - refilled  8. Diabetes with neuropathy- watch sugars- discussed diet. Novolog refilled  9. Hyperlipidemia - on  lipitor  10. Allergic rhinitis - stable  12. CRI -stable on labs  13. Obesity - discussed diet, exercise and weight loss  14. CAD -risk factor modification  15. Aortic atherosclerosis and possible mesenteric artery stenosis- risk factor modification  16. Tortuous aorta - HTN controlled  17. Colon polyps - Colonoscopy in 8/2013 - 2 polyps. Flex sig 11/2013 - mild granular mucosa. Colonoscopy 6/2017 diverticulosis - no polyps due 2023.   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 .Restless leg syndrome - on roprinole    20. Morbid obestiy - working on diet.   .  MMG 6/21  Prevnar 10/15 and flu shot 11/16  I will see her back in 1 month sooner if problems arise    Suspect that she is having trouble with managing her medications and compliance with medicines. Case management referral.

## 2022-01-10 NOTE — TELEPHONE ENCOUNTER
Hal DC. Request already responded to by other means (e.g. phone or fax)   Refill Authorization Note   Henrietta Villasenor  is requesting a refill authorization.  Brief Assessment and Rationale for Refill:  Quick Discontinue  Medication Therapy Plan:       Medication Reconciliation Completed:  No      Comments:   Pended Medication(s)       Requested Prescriptions     Refused Prescriptions Disp Refills    pantoprazole (PROTONIX) 40 MG tablet [Pharmacy Med Name: PANTOPRAZOLE SODIUM 40 MG Tablet Delayed Release] 90 tablet 4     Sig: TAKE 1 TABLET EVERY DAY     Refused By: ENMANUEL WILBURN     Reason for Refusal: Request already responded to by other means (e.g. phone or fax)        Duplicate Pended Encounter(s)/ Last Prescribed Details: (includes pharmacy & prescriber details)   Providers    Ordering and Authorizing Provider:    Jeannine Arriaga MD   [address] [place] [address]   Phone:  [phone]   Fax:  [phone]   ODELL #:  [id number]   NPI:  [id number]        Ordering User:  Jeannine Arriaga MD          Pharmacy    [facility]   [address] [place] [id number]   Phone:  [phone]  Fax:  [phone]   ODELL #:  [id number]       Quantity remainin tablet Quantity used: 90 tablet Next fill due: 4/3/2022   SEVERO Reason: --       Outpatient Medication Detail     Disp Refills Start End SEVERO   pantoprazole (PROTONIX) 40 MG tablet 90 tablet 4 1/3/2022  No   Sig - Route: Take 1 tablet (40 mg total) by mouth once daily. - Oral   Sent to pharmacy as: pantoprazole (PROTONIX) 40 MG tablet   Class: Normal   Order: [id number]   Date/Time Signed: 1/3/2022 13:51         Ordering Encounter Report    Associated Reports   View Encounter                Note composed:7:02 PM 2022

## 2022-01-11 ENCOUNTER — DOCUMENTATION ONLY (OUTPATIENT)
Dept: REHABILITATION | Facility: OTHER | Age: 82
End: 2022-01-11
Payer: MEDICARE

## 2022-01-11 NOTE — PROGRESS NOTES
Pt no show for her 3rd appointment & did not return phone call. She is to be discharged due to lack of attendance.    Andrea Kaba, PT, DPT

## 2022-01-12 ENCOUNTER — DOCUMENTATION ONLY (OUTPATIENT)
Dept: REHABILITATION | Facility: OTHER | Age: 82
End: 2022-01-12
Payer: MEDICARE

## 2022-01-12 PROBLEM — Z74.09 IMPAIRED FUNCTIONAL MOBILITY AND ACTIVITY TOLERANCE: Status: RESOLVED | Noted: 2021-12-07 | Resolved: 2022-01-12

## 2022-01-12 PROBLEM — Z78.9 ALTERATION IN PERFORMANCE OF ACTIVITIES OF DAILY LIVING: Status: RESOLVED | Noted: 2021-12-07 | Resolved: 2022-01-12

## 2022-01-12 NOTE — PROGRESS NOTES
Pt to discharged from OT due to lack of attendance and communication re: therapy visits.     TREE Roy  Occupational Therapist

## 2022-01-18 ENCOUNTER — OFFICE VISIT (OUTPATIENT)
Dept: CARDIOLOGY | Facility: CLINIC | Age: 82
End: 2022-01-18
Payer: MEDICARE

## 2022-01-18 VITALS
SYSTOLIC BLOOD PRESSURE: 150 MMHG | DIASTOLIC BLOOD PRESSURE: 90 MMHG | WEIGHT: 228.63 LBS | HEART RATE: 78 BPM | BODY MASS INDEX: 34.65 KG/M2 | OXYGEN SATURATION: 98 % | HEIGHT: 68 IN

## 2022-01-18 DIAGNOSIS — E78.2 MIXED HYPERLIPIDEMIA: ICD-10-CM

## 2022-01-18 DIAGNOSIS — Z79.4 TYPE 2 DIABETES MELLITUS WITH DIABETIC POLYNEUROPATHY, WITH LONG-TERM CURRENT USE OF INSULIN: ICD-10-CM

## 2022-01-18 DIAGNOSIS — E11.42 TYPE 2 DIABETES MELLITUS WITH DIABETIC POLYNEUROPATHY, WITH LONG-TERM CURRENT USE OF INSULIN: ICD-10-CM

## 2022-01-18 DIAGNOSIS — I82.433 DEEP VEIN THROMBOSIS (DVT) OF POPLITEAL VEIN OF BOTH LOWER EXTREMITIES, UNSPECIFIED CHRONICITY: ICD-10-CM

## 2022-01-18 DIAGNOSIS — I25.2 HISTORY OF MI (MYOCARDIAL INFARCTION): ICD-10-CM

## 2022-01-18 DIAGNOSIS — I73.9 PAD (PERIPHERAL ARTERY DISEASE): ICD-10-CM

## 2022-01-18 DIAGNOSIS — I25.118 ATHEROSCLEROSIS OF NATIVE CORONARY ARTERY OF NATIVE HEART WITH OTHER FORM OF ANGINA PECTORIS: ICD-10-CM

## 2022-01-18 DIAGNOSIS — N18.31 STAGE 3A CHRONIC KIDNEY DISEASE: ICD-10-CM

## 2022-01-18 DIAGNOSIS — R55 SYNCOPE AND COLLAPSE: ICD-10-CM

## 2022-01-18 DIAGNOSIS — I70.0 AORTIC ATHEROSCLEROSIS: ICD-10-CM

## 2022-01-18 DIAGNOSIS — R07.9 CHEST PAIN, UNSPECIFIED TYPE: ICD-10-CM

## 2022-01-18 DIAGNOSIS — R07.89 OTHER CHEST PAIN: ICD-10-CM

## 2022-01-18 DIAGNOSIS — E78.00 HYPERCHOLESTEROLEMIA: ICD-10-CM

## 2022-01-18 DIAGNOSIS — G89.29 OTHER CHRONIC PAIN: ICD-10-CM

## 2022-01-18 DIAGNOSIS — G47.33 OSA ON CPAP: Chronic | ICD-10-CM

## 2022-01-18 DIAGNOSIS — I10 ESSENTIAL HYPERTENSION: Chronic | ICD-10-CM

## 2022-01-18 DIAGNOSIS — I25.119 CORONARY ARTERY DISEASE INVOLVING NATIVE CORONARY ARTERY OF NATIVE HEART WITH ANGINA PECTORIS: ICD-10-CM

## 2022-01-18 DIAGNOSIS — I77.1 TORTUOUS AORTA: ICD-10-CM

## 2022-01-18 DIAGNOSIS — I50.32 CHRONIC DIASTOLIC CONGESTIVE HEART FAILURE: ICD-10-CM

## 2022-01-18 DIAGNOSIS — I87.2 VENOUS INSUFFICIENCY OF BOTH LOWER EXTREMITIES: Primary | ICD-10-CM

## 2022-01-18 DIAGNOSIS — I77.819 ECTATIC AORTA: ICD-10-CM

## 2022-01-18 PROCEDURE — 1159F PR MEDICATION LIST DOCUMENTED IN MEDICAL RECORD: ICD-10-PCS | Mod: HCNC,CPTII,S$GLB, | Performed by: INTERNAL MEDICINE

## 2022-01-18 PROCEDURE — 99215 OFFICE O/P EST HI 40 MIN: CPT | Mod: HCNC,S$GLB,, | Performed by: INTERNAL MEDICINE

## 2022-01-18 PROCEDURE — 1125F PR PAIN SEVERITY QUANTIFIED, PAIN PRESENT: ICD-10-PCS | Mod: HCNC,CPTII,S$GLB, | Performed by: INTERNAL MEDICINE

## 2022-01-18 PROCEDURE — 1159F MED LIST DOCD IN RCRD: CPT | Mod: HCNC,CPTII,S$GLB, | Performed by: INTERNAL MEDICINE

## 2022-01-18 PROCEDURE — 3288F PR FALLS RISK ASSESSMENT DOCUMENTED: ICD-10-PCS | Mod: HCNC,CPTII,S$GLB, | Performed by: INTERNAL MEDICINE

## 2022-01-18 PROCEDURE — 1125F AMNT PAIN NOTED PAIN PRSNT: CPT | Mod: HCNC,CPTII,S$GLB, | Performed by: INTERNAL MEDICINE

## 2022-01-18 PROCEDURE — 1101F PT FALLS ASSESS-DOCD LE1/YR: CPT | Mod: HCNC,CPTII,S$GLB, | Performed by: INTERNAL MEDICINE

## 2022-01-18 PROCEDURE — 3288F FALL RISK ASSESSMENT DOCD: CPT | Mod: HCNC,CPTII,S$GLB, | Performed by: INTERNAL MEDICINE

## 2022-01-18 PROCEDURE — 99499 RISK ADDL DX/OHS AUDIT: ICD-10-PCS | Mod: S$GLB,,, | Performed by: STUDENT IN AN ORGANIZED HEALTH CARE EDUCATION/TRAINING PROGRAM

## 2022-01-18 PROCEDURE — 99499 UNLISTED E&M SERVICE: CPT | Mod: S$GLB,,, | Performed by: STUDENT IN AN ORGANIZED HEALTH CARE EDUCATION/TRAINING PROGRAM

## 2022-01-18 PROCEDURE — 3077F SYST BP >= 140 MM HG: CPT | Mod: HCNC,CPTII,S$GLB, | Performed by: INTERNAL MEDICINE

## 2022-01-18 PROCEDURE — 1101F PR PT FALLS ASSESS DOC 0-1 FALLS W/OUT INJ PAST YR: ICD-10-PCS | Mod: HCNC,CPTII,S$GLB, | Performed by: INTERNAL MEDICINE

## 2022-01-18 PROCEDURE — 99999 PR PBB SHADOW E&M-EST. PATIENT-LVL V: CPT | Mod: PBBFAC,HCNC,, | Performed by: INTERNAL MEDICINE

## 2022-01-18 PROCEDURE — 99999 PR PBB SHADOW E&M-EST. PATIENT-LVL V: ICD-10-PCS | Mod: PBBFAC,HCNC,, | Performed by: INTERNAL MEDICINE

## 2022-01-18 PROCEDURE — 3080F DIAST BP >= 90 MM HG: CPT | Mod: HCNC,CPTII,S$GLB, | Performed by: INTERNAL MEDICINE

## 2022-01-18 PROCEDURE — 3077F PR MOST RECENT SYSTOLIC BLOOD PRESSURE >= 140 MM HG: ICD-10-PCS | Mod: HCNC,CPTII,S$GLB, | Performed by: INTERNAL MEDICINE

## 2022-01-18 PROCEDURE — 3080F PR MOST RECENT DIASTOLIC BLOOD PRESSURE >= 90 MM HG: ICD-10-PCS | Mod: HCNC,CPTII,S$GLB, | Performed by: INTERNAL MEDICINE

## 2022-01-18 PROCEDURE — 99215 PR OFFICE/OUTPT VISIT, EST, LEVL V, 40-54 MIN: ICD-10-PCS | Mod: HCNC,S$GLB,, | Performed by: INTERNAL MEDICINE

## 2022-01-18 RX ORDER — AMLODIPINE BESYLATE 10 MG/1
10 TABLET ORAL DAILY
Qty: 90 TABLET | Refills: 3 | Status: SHIPPED | OUTPATIENT
Start: 2022-01-18 | End: 2023-01-26 | Stop reason: SDUPTHER

## 2022-01-18 NOTE — PATIENT INSTRUCTIONS
Assessment and Plan:  Henrietta Villasenor is a 81-year-old female with non obstructive CAD, PAD, venous insufficiency, IDDM2, hypertension, morbid obesity, peripheral neuropathy, osteoarthritis s/p LTKA, who presents for a follow up appointment.    1. Acute BLE popliteal DVT- unprovoked.  BLE Venous Reflux Study on 3/30/2021 no evidence of lower extremity DVT bilaterally.  Previously noted bilateral popliteal vein DVT (12/15/2020) no longer present.  Continue anticoagulation with apixaban 5 mg twice a day.    2. BLE edema -Due to venous insufficiency.  BLE Venous Reflux Study on 3/30/2021 revealed minimal BLE venous reflux.  Prescribe new compression stockings.  Pt to elevate legs when resting.  LImit sodium intake up tp 2 g a day and fluid intake to 1.5 L a day.     3. Chest pain- last episode about 2 weeks ago with associated dyspnea. Check PET stress and echo to evaluate for ischemic heart disease.    4. PAD- LLE arterial ultrasound on 11/23/2020 revealed BALA of 0.88 on the right indicating mild peripheral artery disease.  Left lower extremity BALA is within normal limits at 0.99.  No hemodynamically significant stenosis demonstrated in the left lower extremity arterial system.  Continue asa 81 mg and start Atorvastatin 40 mg for medical management of peripheral artery disease. Encourage daily exercise 30 mins per day, 5 days per week.    5. HTN- uncontrolled. Increase amlodipine to 10mg.    6. Obesity- Encourage dietary modification and moderate/aerobic exercise for a minimum of 30 minutes for 5 days a week.     Follow up in 2 months

## 2022-01-18 NOTE — PROGRESS NOTES
[facility]         Chief Complaint   Patient presents with    BLE pain        Subjective   Patient ID: Henrietta Villasenor is a 81-year-old female with non obstructive CAD, PAD, venous insufficiency, IDDM2, hypertension, morbid obesity, peripheral neuropathy, osteoarthritis s/p LTKA, who presents for a follow up appointment.    -Patient  kindly referred to Vascular medicine clinic by Andreina Aguiar PA-C for evaluation of lower extremity pain.     - At initial clinic visit on 12/01/20, Mrs. Villasenor reported progressive worsening of BLE pain and swelling in her legs for past 2 months. She reports no trauma to legs.  She reports both an aching in her thigh, knee and shin as well as sharp, stabbing intermittent pain radiating from her left lower back down her posterior leg.  She tkes Tylenol at home which helps alleviate the her pain slightly.  She reports associated leg swelling. Patient reports no claudication, CLI, tissue loss, pigmentation changes. She has no history of DVT/PE, no recent surgeries. she was seen in pain management clinic for epidural steroid injection in early October. Patient was seen in the ED on 11/23/20 due to the leg pain. X-rays of hip and knees (11/23/20) revealed no acute malalignment, loosening or displaced fracture-dislocation. BLE Venous Ultrasound (11/23/20) revealed no evidence of DVT, incidental finding of atherosclerosis of popliteal artery was noted.  BALA revealed (11/23/20) BALA of 0.88 on the right indicating mild peripheral artery disease.  Left lower extremity BALA is within normal limits at 0.99.  No hemodynamically significant stenosis demonstrated in the left lower extremity arterial system. Patient was discharged from ED.   Plan  BLE pain (Left > Right) - Pain is likely neuropathic in origin.  Pt has mild PAD, and symptoms are not classic for claudication.  LLE arterial ultrasound on 11/23/2020 revealed BALA of 0.88 on the right indicating mild peripheral artery disease.   Left lower extremity BALA is within normal limits at 0.99.  No hemodynamically significant stenosis demonstrated in the left lower extremity arterial system.   Refer to neurology for evaluation of neuropathic pain.   BLE edema -Likely due to venous insufficiency. Check BLE venous reflux study.  Pt to elevate legs when resting.  Restrict sodium intake up tp 2 g a day and fluid intake to 1.5 L a day.  PAD- LLE arterial ultrasound on 11/23/2020 revealed BALA of 0.88 on the right indicating mild peripheral artery disease.  Left lower extremity BALA is within normal limits at 0.99.  No hemodynamically significant stenosis demonstrated in the left lower extremity arterial system.  Continue asa 81 mg and start Atorvastatin 40 mg for medical management of peripheral artery disease.  Obesity  - BMI today 33.72 Kg/M2. Encourage dietary modification and moderate/aerobic exercise for a minimum of 30 minutes for 5 days a week.     Interval History (12/15/20)  Mrs. Villasenor presents today for vascular study - venous ultrasound of bilateral lower extremities at [facility].   Called by vascular technologist to evaluate the US image findings.   Dr. Mon and I examined and evaluated the patient in the vascular lab today at 1400 on 12/15/20.   She was noted to have acute DVT of bilateral popliteal veins bilaterally.  Patient continues to report pain in bilateral lower extremities (as on her clinic visit on 12/01/20).  Also, reports mild intermittent dyspnea with exertion.  Reports no chest pain, lightheadness or palpitations.    Physical exam significant for TTP bilateral lower extremities, cardiopulmonary exam was unremarkable.   Labs reviewed. Creatine 1.0 baseline.  Assessment: Acute popliteal vein DVT bilateral lower extremities. Given patient reports mild intermittent dyspnea, will evaluate for pulmonary embolism.   Plan: Check CTA chest with contrast to evaluate Pulmonary embolism.  Start apixaban 10 mg BID x 7 days,  and 5 mg BID thereafter.  Update: Chest CTA negative for pulmonary embolism.     -At follow up visit on 12/29/2020, Mrs. Villasenor reported pain in the neck radiating to the left arm and pain in the left hip radiating to the leg.   Patient also noted a lump on the left arm with bruising, close the elbow, circular and tender to palpation.    Plan:   Acute BLE popliteal DVT - Likely unprovoked.  BLE Venous US (12/15/20) revealed Bilateral popliteal vein DVT. Minimal reflux in right GSV in below-knee segment. Refer to hematology for hypercoaguable work up.  Continue anticoagulation with apixaban 5 mg twice a day.   BLE pain (Left > Right) - Pain is likely neuropathic in origin.  Patient has chronic leg and arm pain due to DJD of cervical and Lumbar spine.  Pt has mild PAD, and symptoms are not classic for claudication.  LLE arterial ultrasound on 11/23/2020 revealed BALA of 0.88 on the right indicating mild peripheral artery disease.  Left lower extremity BALA is within normal limits at 0.99.  No hemodynamically significant stenosis demonstrated in the left lower extremity arterial system.  Patient is following with pain clinic.  Refer to neurology for evaluation of neuropathic pain.   Forearm swelling - Likely due to hematoma in setting of recent fall.  Check MRI forearm with contrast to evaluate for soft tissue swelling.   BLE edema -Likely due to venous insufficiency and post thrombotic syndrome. Significant TTP on palpation, no evidence of Phlegmasia cerulea dolens. Pt to elevate legs when resting.  Restrict sodium intake up tp 2 g a day and fluid intake to 1.5 L a day.   PAD- LLE arterial ultrasound on 11/23/2020 revealed BALA of 0.88 on the right indicating mild peripheral artery disease.  Left lower extremity BALA is within normal limits at 0.99.  No hemodynamically significant stenosis demonstrated in the left lower extremity arterial system.  Continue asa 81 mg and start Atorvastatin 40 mg for medical management of  peripheral artery disease.  Obesity  - BMI today 33.42 Kg/M2. Encourage dietary modification and moderate/aerobic exercise for a minimum of 30 minutes for 5 days a week.     -At clinic visit on 2/4/2021, Mrs. Villasenor reported left arm hematoma now healed.  Pt now followed by Neurology for neuropathic pain/neuropathy.  She has no BLE claudication or tissue loss.  Tolerating Eliquis 5 mg bid with no bleeding issues.  MRI left forearm on 1/6/2021 revealed a small fluid collection within the subcutaneous soft tissues overlying the posterolateral aspect of the elbow that demonstrates imaging characteristics favoring a hematoma.    Plan:   Acute BLE popliteal DVT - Likely unprovoked.  BLE Venous US (12/15/20) revealed Bilateral popliteal vein DVT. Minimal reflux in right GSV in below-knee segment. Refer to hematology for hypercoaguable work up.  Continue anticoagulation with apixaban 5 mg twice a day.   BLE pain (Left > Right) - Pain is likely neuropathic in origin.  Pt now followed by Neurology for neuropathic pain/neuropathy.   Forearm swelling - Now resolved.    BLE edema -Likely due to venous insufficiency and post thrombotic syndrome.  Pt to elevate legs when resting.  Restrict sodium intake up tp 2 g a day and fluid intake to 1.5 L a day.   PAD- LLE arterial ultrasound on 11/23/2020 revealed BALA of 0.88 on the right indicating mild peripheral artery disease.  Left lower extremity BALA is within normal limits at 0.99.  No hemodynamically significant stenosis demonstrated in the left lower extremity arterial system.  Continue asa 81 mg and start Atorvastatin 40 mg for medical management of peripheral artery disease.  Obesity  - BMI today 33.42 Kg/M2. Encourage dietary modification and moderate/aerobic exercise for a minimum of 30 minutes for 5 days a week.     -At clinic visit on 4/6/2021, Mrs. Villasenor reported no new symptoms.  She has no significant LE edema or tissue loss.  BLE Venous Reflux Study on 3/30/2021 revealed  minimal BLE venous reflux.  There is no evidence of lower extremity DVT bilaterally.  Previously noted bilateral popliteal vein DVT (12/15/2020) no longer present.  Plan:   Acute BLE popliteal DVT- Appears unprovoked.  BLE Venous Reflux Study on 3/30/2021 no evidence of lower extremity DVT bilaterally.  Previously noted bilateral popliteal vein DVT (12/15/2020) no longer present.  Refer to hematology for evaluation.  Continue anticoagulation with apixaban 5 mg twice a day.   BLE pain (Left > Right) - Pain is likely neuropathic in origin.  Pt now followed by Neurology for neuropathic pain/neuropathy.   BLE edema -Due to venous insufficiency.  BLE Venous Reflux Study on 3/30/2021 revealed minimal BLE venous reflux.  There is no evidence of lower extremity DVT bilaterally.  Start graduated compression hose.  Pt to elevate legs when resting.  LImit sodium intake up tp 2 g a day and fluid intake to 1.5 L a day.   PAD- LLE arterial ultrasound on 11/23/2020 revealed BALA of 0.88 on the right indicating mild peripheral artery disease.  Left lower extremity BALA is within normal limits at 0.99.  No hemodynamically significant stenosis demonstrated in the left lower extremity arterial system.  Continue asa 81 mg and start Atorvastatin 40 mg for medical management of peripheral artery disease.  Obesity- Encourage dietary modification and moderate/aerobic exercise for a minimum of 30 minutes for 5 days a week.    -At clinic visit on 7/6/2021, Mrs. Villasenor reported no claudication or tissue loss.  Main complaint is pain at area of BLE varicose veins.    Plan:   Acute BLE popliteal DVT- Appears unprovoked.  BLE Venous Reflux Study on 3/30/2021 no evidence of lower extremity DVT bilaterally.  Previously noted bilateral popliteal vein DVT (12/15/2020) no longer present.  Continue anticoagulation with apixaban 5 mg twice a day.   BLE pain (Left > Right) - Pain is likely neuropathic in origin.  Pt now followed by Neurology for  neuropathic pain/neuropathy.   BLE edema -Due to venous insufficiency.  BLE Venous Reflux Study on 3/30/2021 revealed minimal BLE venous reflux.  There is no evidence of lower extremity DVT bilaterally.  Continue graduated compression hose.  Pt to elevate legs when resting.  LImit sodium intake up tp 2 g a day and fluid intake to 1.5 L a day.   PAD- LLE arterial ultrasound on 11/23/2020 revealed BALA of 0.88 on the right indicating mild peripheral artery disease.  Left lower extremity BALA is within normal limits at 0.99.  No hemodynamically significant stenosis demonstrated in the left lower extremity arterial system.  Continue asa 81 mg and start Atorvastatin 40 mg for medical management of peripheral artery disease.  Obesity- Encourage dietary modification and moderate/aerobic exercise for a minimum of 30 minutes for 5 days a week.     10/12/2021 clinic visit: Mrs. Villasenor reports frequent episodes of neuropathic pain.  She has no significant edema, claudication or tissue loss.    Plan:  Acute BLE popliteal DVT- Appears unprovoked.  BLE Venous Reflux Study on 3/30/2021 no evidence of lower extremity DVT bilaterally.  Previously noted bilateral popliteal vein DVT (12/15/2020) no longer present.  Continue anticoagulation with apixaban 5 mg twice a day.   BLE pain (Left > Right) - Pain is likely neuropathic in origin.  Refer back to Neurology for evaluation.   BLE edema -Due to venous insufficiency.  BLE Venous Reflux Study on 3/30/2021 revealed minimal BLE venous reflux.  Continue graduated compression hose.  Pt to elevate legs when resting.  LImit sodium intake up tp 2 g a day and fluid intake to 1.5 L a day.   PAD- LLE arterial ultrasound on 11/23/2020 revealed BALA of 0.88 on the right indicating mild peripheral artery disease.  Left lower extremity BALA is within normal limits at 0.99.  No hemodynamically significant stenosis demonstrated in the left lower extremity arterial system.  Continue asa 81 mg and start  Atorvastatin 40 mg for medical management of peripheral artery disease.  Obesity- Encourage dietary modification and moderate/aerobic exercise for a minimum of 30 minutes for 5 days a week.      HPI:  Mrs. Villasenor reports itching, pain, and swelling of her legs. She states she has been using compression stockings but not consistently because she has difficulty wearing them. She has also noticed arthritis in her wrists and fingers. She also complains of occasional chest pain with dyspnea with the last episode 2 weeks ago.    Past Medical History:   Diagnosis Date    Abnormality of lung 11/08/2011    Stable bandlike opacities at the lung bases, most likely representing      Anxiety     Arthritis     CAD (coronary artery disease)     Calculus of ureter 2/22/2011    CHF (congestive heart failure)     Chronic back pain 7/29/2012    Colon polyp 9/2010; 11/2010    Colon polyps 7/29/2012    Coronary artery disease involving native coronary artery of native heart with angina pectoris     Depression     Diabetes mellitus     Diabetes mellitus type II     Diverticulitis large intestine 7/27/2015    Diverticulitis of large intestine without perforation or abscess without bleeding     Diverticulosis 09/25/2010; 11/02/2010; 11/08/2011; 7/29/2012    Duodenal disorder 08/25/2011    Duodenal erosion noted on EGD.    Duodenal ulcer, unspecified as acute or chronic, without hemorrhage, perforation, or obstruction 8/24/2011    E. coli sepsis 12/2010    Due to left ureteral stone with left nephrostomy tube - hospitalized in [place]    Esophageal dysmotility 01/24/2012    Noted on upper GI-barium swallow.    Facial weakness 1969    Left facial weakness s/p left mastoidectomy in ~ 1969.    Fatty liver 11/08/2011    Reported on CT-abdomen and in 06/2012 Gastro clinic visit note.    Gastric polyp 09/29/2010    GERD (gastroesophageal reflux disease) 7/29/2012    Hepatomegaly 11/08/2011    Reported on CT-abdomen     Herpes zoster with other nervous system complications(053.19) 2/28/2011    Hiatal hernia 06/26/2006; 09/29/2010; 08/25/2011    Noted on barium swallow 2006; noted on  EGD 2011.   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    Trouble in sleeping     Type II or unspecified type diabetes mellitus with neurological manifestations, not stated as uncontrolled(250.60)     Type II or unspecified type diabetes mellitus with peripheral circulatory disorders, not stated as uncontrolled(250.70)     Type II or unspecified type diabetes mellitus with renal manifestations, not stated as uncontrolled(250.40)        Past Surgical History:   Procedure Laterality Date    BELPHAROPTOSIS REPAIR      s/p LAMBERTO levator repair - Dr. Dejesus    CARDIAC CATHETERIZATION      CARPAL TUNNEL RELEASE  3/13/2012    CATARACT EXTRACTION W/  INTRAOCULAR LENS IMPLANT Right 10/31/2018        CATARACT EXTRACTION W/  INTRAOCULAR LENS IMPLANT Left 03/22/2018        CHOLECYSTECTOMY      COLONOSCOPY N/A 11/16/2016    Procedure: COLONOSCOPY;  Surgeon: Chris Storey MD;  Location: [facility] ([address] [place] [address]);  Service: Endoscopy;  Laterality: N/A;    COLONOSCOPY N/A 6/14/2017     Procedure: COLONOSCOPY;  Surgeon: Chris Storey MD;  Location: [facility] (4TH FLR);  Service: Endoscopy;  Laterality: N/A;  colonoscopy in 3 months with better bowel prep. - Split PEG prep ordered    ESOPHAGOGASTRODUODENOSCOPY N/A 11/10/2021    Procedure: EGD (ESOPHAGOGASTRODUODENOSCOPY);  Surgeon: Kuldeep Velázquez MD;  Location: [facility] ENDO (4TH FLR);  Service: Endoscopy;  Laterality: N/A;  11/4-eliquis hold ok see te -tb-fully vacc-inst mail and verbal-    EXTRACORPOREAL SHOCK WAVE LITHOTRIPSY      INJECTION OF ANESTHETIC AGENT AROUND NERVE Bilateral 6/24/2020    Procedure: BLOCK, NERVE, C4-C5-C6 MEDIAL BRANCH ok per Clive to add on;  Surgeon: Oscar Ravi MD;  Location: [facility] PAIN MGT;  Service: Pain Management;  Laterality: Bilateral;    INTRAOCULAR PROSTHESES INSERTION Right 10/31/2018    Procedure: INSERTION-INTRAOCULAR LENS (IOL);  Surgeon: Keysha Valle MD;  Location: [facility] OR;  Service: Ophthalmology;  Laterality: Right;    JOINT REPLACEMENT      mastoid tumor removal  1969    Left mastoidectomy with residual left facial weakness.    NEPHROSTOMY      OOPHORECTOMY      PHACOEMULSIFICATION OF CATARACT Right 10/31/2018    Procedure: PHACOEMULSIFICATION-ASPIRATION-CATARACT;  Surgeon: Keysha Valle MD;  Location: [facility];  Service: Ophthalmology;  Laterality: Right;    TOTAL ABDOMINAL HYSTERECTOMY  1969    TAHBSO    TOTAL KNEE ARTHROPLASTY  9/13/2012    Left    TRANSFORAMINAL EPIDURAL INJECTION OF STEROID Bilateral 9/17/2019    Procedure: INJECTION, STEROID, EPIDURAL, TRANSFORAMINAL APPROACH;  Surgeon: Oscar Ravi MD;  Location: [facility] PAIN [facility]GT;  Service: Pain Management;  Laterality: Bilateral;  B/L TFESI L4/L5    TRANSFORAMINAL EPIDURAL INJECTION OF STEROID Bilateral 10/2/2020    Procedure: INJECTION, STEROID, EPIDURAL, TRANSFORAMINAL APPROACH;  Surgeon: Oscar Ravi MD;  Location: [facility] PAIN MGT;  Service: Pain Management;  Laterality: Bilateral;  B/L TFESI L4/5     TRANSFORAMINAL EPIDURAL INJECTION OF STEROID Bilateral 5/7/2021    Procedure: INJECTION, STEROID, EPIDURAL, TRANSFORAMINAL APPROACH, L4-L5 clear to hold Eliquis 3 days;  Surgeon: Oscar Ravi MD;  Location: [facility]GT;  Service: Pain Management;  Laterality: Bilateral;       Family History   Problem Relation Age of Onset    Sleep apnea Sister     Diabetes Sister     Cancer Mother         brain tumor    Hypertension Mother     Heart disease Father     Heart attack Father     Dementia Brother     Lupus Brother     Frontotemporal dementia Brother     No Known Problems Daughter     No Known Problems Son     Diabetes Sister     Lupus Sister     Breast cancer Sister     Diabetes Sister     Cancer Sister         breast cancer    Heart attack Sister     Diabetes Sister     Liver cancer Brother     Drug abuse Brother     Alcohol abuse Brother     Cirrhosis Brother     Drug abuse Brother     No Known Problems Daughter     No Known Problems Son     No Known Problems Son     No Known Problems Maternal Grandmother     No Known Problems Maternal Grandfather     No Known Problems Paternal Grandmother     No Known Problems Paternal Grandfather     Diabetes Brother     Dementia Brother     Diabetes Other     Ovarian cancer Other         Niece     Breast cancer Maternal Aunt     No Known Problems Maternal Uncle     No Known Problems Paternal Aunt     No Known Problems Paternal Uncle     Glaucoma Neg Hx     Blindness Neg Hx     Celiac disease Neg Hx     Colon cancer Neg Hx     Colon polyps Neg Hx     Esophageal cancer Neg Hx     Inflammatory bowel disease Neg Hx     Liver disease Neg Hx     Rectal cancer Neg Hx     Stomach cancer Neg Hx     Ulcerative colitis Neg Hx     Melanoma Neg Hx     Multiple sclerosis Neg Hx     Psoriasis Neg Hx     Amblyopia Neg Hx     Cataracts Neg Hx     Macular degeneration Neg Hx     Retinal detachment Neg Hx     Strabismus Neg Hx     Stroke  "Neg Hx     Thyroid disease Neg Hx        Social History     Tobacco Use    Smoking status: Former Smoker     Packs/day: 1.00     Years: 15.00     Pack years: 15.00     Types: Cigarettes     Quit date: 2000     Years since quittin.5    Smokeless tobacco: Never Used   Substance Use Topics    Alcohol use: No       Review of patient's allergies indicates:   Allergen Reactions    Crestor [rosuvastatin]      Cramping in legs    Ezetimibe Diarrhea     Other reaction(s): abdominal pain, Diarrhea      Hydrocodone Itching    Lisinopril      Other reaction(s): cough      Sulfa (sulfonamide antibiotics) Itching and Rash           Sulfamethoxazole     Sulfamethoxazole-trimethoprim     Valsartan Swelling       Current Outpatient Medications on File Prior to Visit   Medication Sig Dispense Refill    ACCU-CHEK GUIDE TEST STRIPS Strp USE TO CHECK BLOOD SUGAR THREE TIMES DAILY 150 strip 4    ACCU-CHEK SMARTVIEW TEST STRIP Strp TEST THREE TIMES DAILY 300 strip 3    albuterol (VENTOLIN HFA) 90 mcg/actuation inhaler Inhale 2 puffs by mouth into the lungs every 4 (four) hours as needed for Wheezing. Rescue 18 g 1    allopurinoL (ZYLOPRIM) 100 MG tablet Take 1 tablet (100 mg total) by mouth once daily. 90 tablet 3    amLODIPine (NORVASC) 5 MG tablet Take 1 tablet (5 mg total) by mouth once daily. 90 tablet 3    aspirin (ECOTRIN) 81 MG EC tablet Take 1 tablet by mouth Daily.      atorvastatin (LIPITOR) 40 MG tablet TAKE 1 TABLET EVERY DAY 90 tablet 3    azelastine (ASTELIN) 137 mcg (0.1 %) nasal spray Spray 1 spray (137 mcg total) by Nasal route 2 (two) times daily. 30 mL 3    BD ALCOHOL SWABS PadM USE  WHEN  TESTING BLOOD SUGAR FOUR TIMES DAILY 400 each 3    BD ULTRA-FINE OLU PEN NEEDLES 32 x 5/32 " Ndle Uses 4 times daily, on multiple daily insulin injections 130 each 12    blood-glucose meter kit Use as instructed 1 each 0    carbamide peroxide (EAR WAX REMOVAL DROPS OTIC)       carvediloL (COREG) 25 " "MG tablet Take 1 tablet (25 mg total) by mouth 2 (two) times daily. 180 tablet 3    cholecalciferol, vitamin D3, (DIALYVITE VITAMIN D) 5,000 unit capsule Take 5,000 Units by mouth once daily.      DULoxetine (CYMBALTA) 60 MG capsule Take 1 capsule (60 mg total) by mouth 2 (two) times daily. 180 capsule 3    fluticasone (FLONASE) 50 mcg/actuation nasal spray SHAKE LQ AND U 2 SPRAYS IEN QD 1 Bottle 6    furosemide (LASIX) 40 MG tablet TAKE 1 TABLET BY MOUTH TWICE DAILY 180 tablet 4    insulin (LANTUS SOLOSTAR U-100 INSULIN) glargine 100 units/mL (3mL) SubQ pen Inject 30 Units into the skin 2 (two) times a day. 60 mL 1    insulin aspart U-100 (NOVOLOG) 100 unit/mL (3 mL) InPn pen Inject 24 Units into the skin before breakfast AND 28 Units daily with lunch AND 28 Units before dinner. 120 mL 3    lancets (ACCU-CHEK FASTCLIX LANCING DEV) Misc Lancets and device = check blood sugar 4 times daily 400 each 4    linaGLIPtin (TRADJENTA) 5 mg Tab tablet Take 1 tablet (5 mg total) by mouth once daily. 90 tablet 3    loratadine (CLARITIN) 10 mg tablet Take 1 tablet (10 mg total) by mouth once daily. 30 tablet 4    meclizine (ANTIVERT) 12.5 mg tablet Take 1 tablet (12.5 mg total) by mouth 3 (three) times daily as needed for Dizziness. 30 tablet 0    olopatadine (PATADAY) 0.2 % Drop Place 1 drop into both eyes once daily. 2.5 mL 12    [START ON 12/10/2020] oxyCODONE-acetaminophen (PERCOCET)  mg per tablet Take 1 tablet by mouth 5 (five) times daily. 150 tablet 0    pantoprazole (PROTONIX) 40 MG tablet TAKE 1 TABLET(40 MG) BY MOUTH twice daily 180 tablet 4    pen needle, diabetic (SURE-FINE PEN NEEDLES) 29 gauge x 1/2" Ndle Use 4 times daily 400 each 4    rOPINIRole (REQUIP) 0.5 MG tablet TAKE 1 TABLET (0.5 MG TOTAL) BY MOUTH EVERY EVENING. 90 tablet 1    TRUEPLUS LANCETS 33 gauge Misc TEST BLOOD SUGAR FOUR TIMES DAILY 400 each 4     Review of Systems   Constitution: Negative for chills, fever and " malaise/fatigue.   HENT: Negative for congestion and hoarse voice.    Eyes: Negative for visual disturbance.   Cardiovascular: Positive for left knee swelling. Negative for dyspnea on exertion, orthopnea and paroxysmal nocturnal dyspnea.   Respiratory: Negative for cough, shortness of breath and sputum production.    Endocrine: Negative for cold intolerance, heat intolerance, polydipsia and polyuria.   Hematologic/Lymphatic: Negative for adenopathy and bleeding problem. Does not bruise/bleed easily.   Skin: Negative for color change, flushing and itching.   Musculoskeletal: Positive for leg pain and tenderness to palpation.  Left arm swelling near elbow. Negative for back pain, joint pain, joint swelling and muscle cramps.   Gastrointestinal: Negative for abdominal pain, constipation, diarrhea and nausea.   Genitourinary: Negative for pelvic pain.   Neurological: Negative for focal weakness, light-headedness, loss of balance, paresthesias, seizures and weakness    Objective   LMP  (LMP Unknown)     Physical Exam   Constitutional: No acute distress, conversant  HEENT: Sclera anicteric, Pupils equal, round and reactive to light, extraocular motions intact, Oropharynx clear  Neck: No JVD, no carotid bruits  Cardiovascular: regular rate and rhythm, no murmur, rubs or gallops, normal S1/S2  Pulmonary: Clear to auscultation bilaterally  Abdominal: Abdomen soft, nontender, nondistended, positive bowel sounds  Extremities: BLE's with prominent varicose veins and TTP. No significant edema. Left lateral knee pain tender to palpation, no knee effusion, or warmth.   Pulses:  Carotid pulses are 2+ on the right side, and 2+ on the left side.  Radial pulses are 2+ on the right side, and 2+ on the left side.   Femoral pulses are 2+ on the right side, and 2+ on the left side.  Popliteal pulses are 2+ on the right side, and 2+ on the left side.   Dorsalis pedis pulses are 2+ on the right side, and 2+ on the left side.   Posterior  tibial pulses are 2+ on the right side, and 2+ on the left side.    Skin: No ecchymosis, erythema, or ulcers  Psych: Alert and oriented x 3, appropriate affect  Neuro: CNII-XII intact, no focal deficits    CMP  Sodium   Date Value Ref Range Status   11/16/2021 141 136 - 145 mmol/L Final     Potassium   Date Value Ref Range Status   11/16/2021 3.5 3.5 - 5.1 mmol/L Final     Chloride   Date Value Ref Range Status   11/16/2021 98 95 - 110 mmol/L Final     CO2   Date Value Ref Range Status   11/16/2021 31 (H) 23 - 29 mmol/L Final     Glucose   Date Value Ref Range Status   11/16/2021 286 (H) 70 - 110 mg/dL Final     BUN   Date Value Ref Range Status   11/16/2021 15 8 - 23 mg/dL Final     Creatinine   Date Value Ref Range Status   11/16/2021 1.1 0.5 - 1.4 mg/dL Final     Calcium   Date Value Ref Range Status   11/16/2021 9.3 8.7 - 10.5 mg/dL Final     Total Protein   Date Value Ref Range Status   11/16/2021 7.3 6.0 - 8.4 g/dL Final     Albumin   Date Value Ref Range Status   11/16/2021 3.3 (L) 3.5 - 5.2 g/dL Final     Total Bilirubin   Date Value Ref Range Status   11/16/2021 0.4 0.1 - 1.0 mg/dL Final     Comment:     For infants and newborns, interpretation of results should be based  on gestational age, weight and in agreement with clinical  observations.    Premature Infant recommended reference ranges:  Up to 24 hours.............<8.0 mg/dL  Up to 48 hours............<12.0 mg/dL  3-5 days..................<15.0 mg/dL  6-29 days.................<15.0 mg/dL       Alkaline Phosphatase   Date Value Ref Range Status   11/16/2021 84 55 - 135 U/L Final     AST   Date Value Ref Range Status   11/16/2021 18 10 - 40 U/L Final     ALT   Date Value Ref Range Status   11/16/2021 11 10 - 44 U/L Final     Anion Gap   Date Value Ref Range Status   11/16/2021 12 8 - 16 mmol/L Final     eGFR if    Date Value Ref Range Status   11/16/2021 54.4 (A) >60 mL/min/1.73 m^2 Final     eGFR if non    Date Value  Ref Range Status   11/16/2021 47.2 (A) >60 mL/min/1.73 m^2 Final     Comment:     Calculation used to obtain the estimated glomerular filtration  rate (eGFR) is the CKD-EPI equation.          Lab Results   Component Value Date    WBC 10.01 11/16/2021    HGB 11.2 (L) 11/16/2021    HCT 35.4 (L) 11/16/2021    MCV 90 11/16/2021     11/16/2021       Imaging/Diagnostics    BLE Venous Reflux Study 3/30/2021:  No evidence of lower extremity DVT bilaterally.  Previously noted bilateral popliteal vein DVT (see report dated 12/15/2020) no longer present.  Minimal right common femoral vein reflux (deep venous insufficiency).  Minimal right GSV reflux in above knee segment.  Minimal left GSV reflux in below knee segment.    MRI Left Forearm 1/6/2021   Small fluid collection within the subcutaneous soft tissues overlying the posterolateral aspect of the elbow that demonstrates imaging characteristics favoring a hematoma and correlating with the reported history of recent trauma.  Consider follow-up ultrasound in 3 months to ensure resolution.    BLE Venous US (12/15/20)  Bilateral popliteal vein DVT.  Minimal reflux in right GSV in below-knee segment    XR Hip (11/23/20)  FINDINGS:  Femoral heads appear normally positioned.  No evidence of acute fracture, subluxation or dislocation.  Mild-moderate joint space narrowing of the hips bilaterally.  Mild degenerative osteophytic spurring of the acetabulum bilaterally   SI joints appear intact.  Pubic rami are intact.  Vascular atherosclerosis.  Impression:  No acute radiographic abnormality.     LLE arterial ultrasound (11/23/20)  Impression:  BALA of 0.88 on the right indicating mild peripheral artery disease.  Left lower extremity BALA is within normal limits at 0.99.   No hemodynamically significant stenosis demonstrated in the left lower extremity arterial system.    XR Knees (11/23/20)  Impression:  Remote left TKA without evidence of acute malalignment, loosening or  displaced fracture-dislocation.    Assessment and Plan:  Henrietta Villasenor is a 81-year-old female with non obstructive CAD, PAD, venous insufficiency, IDDM2, hypertension, morbid obesity, peripheral neuropathy, osteoarthritis s/p LTKA, who presents for a follow up appointment.    1. Acute BLE popliteal DVT- unprovoked.  BLE Venous Reflux Study on 3/30/2021 no evidence of lower extremity DVT bilaterally.  Previously noted bilateral popliteal vein DVT (12/15/2020) no longer present.  Continue anticoagulation with apixaban 5 mg twice a day.    2. BLE edema -Due to venous insufficiency.  BLE Venous Reflux Study on 3/30/2021 revealed minimal BLE venous reflux.  Prescribe new compression stockings.  Pt to elevate legs when resting.  LImit sodium intake up tp 2 g a day and fluid intake to 1.5 L a day.     3. Chest pain- last episode about 2 weeks ago with associated dyspnea. Check PET stress and echo to evaluate for ischemic heart disease.    4. PAD- LLE arterial ultrasound on 11/23/2020 revealed BALA of 0.88 on the right indicating mild peripheral artery disease.  Left lower extremity BALA is within normal limits at 0.99.  No hemodynamically significant stenosis demonstrated in the left lower extremity arterial system.  Continue asa 81 mg and start Atorvastatin 40 mg for medical management of peripheral artery disease. Encourage daily exercise 30 mins per day, 5 days per week.    5. HTN- uncontrolled. Increase amlodipine to 10mg.    6. Obesity- Encourage dietary modification and moderate/aerobic exercise for a minimum of 30 minutes for 5 days a week.     Follow up in 2 months    Total duration of face to face visit time 30 minutes.  Total time spent counseling greater than fifty percent of total visit time.  Counseling included discussion regarding imaging findings, diagnosis, possibilities, treatment options, risks and benefits.  The patient had many questions regarding the options and long-term effects.    Sanford Mello,  MD  Vascular Medicine Fellow    Patient seen and discussed with Dr. Reilly

## 2022-01-25 ENCOUNTER — HOSPITAL ENCOUNTER (OUTPATIENT)
Dept: CARDIOLOGY | Facility: HOSPITAL | Age: 82
Discharge: HOME | End: 2022-01-25
Attending: STUDENT IN AN ORGANIZED HEALTH CARE EDUCATION/TRAINING PROGRAM
Payer: MEDICARE

## 2022-01-25 VITALS
WEIGHT: 229 LBS | DIASTOLIC BLOOD PRESSURE: 80 MMHG | HEIGHT: 68 IN | BODY MASS INDEX: 34.71 KG/M2 | SYSTOLIC BLOOD PRESSURE: 140 MMHG | HEART RATE: 75 BPM

## 2022-01-25 DIAGNOSIS — R07.89 OTHER CHEST PAIN: ICD-10-CM

## 2022-01-25 LAB
ASCENDING AORTA: 3.49 CM
AV INDEX (PROSTH): 0.59
AV MEAN GRADIENT: 9 MMHG
AV PEAK GRADIENT: 17 MMHG
AV VALVE AREA: 2.46 CM2
AV VELOCITY RATIO: 0.51
BSA FOR ECHO PROCEDURE: 2.23 M2
CV ECHO LV RWT: 0.68 CM
DOP CALC AO PEAK VEL: 2.06 M/S
DOP CALC AO VTI: 41.48 CM
DOP CALC LVOT AREA: 4.2 CM2
DOP CALC LVOT DIAMETER: 2.31 CM
DOP CALC LVOT PEAK VEL: 1.06 M/S
DOP CALC LVOT STROKE VOLUME: 101.87 CM3
DOP CALCLVOT PEAK VEL VTI: 24.32 CM
E WAVE DECELERATION TIME: 350.31 MSEC
E/A RATIO: 0.61
E/E' RATIO: 14 M/S
ECHO LV POSTERIOR WALL: 1.48 CM (ref 0.6–1.1)
EJECTION FRACTION: 55 %
FRACTIONAL SHORTENING: 30 % (ref 28–44)
INTERVENTRICULAR SEPTUM: 1.29 CM (ref 0.6–1.1)
IVRT: 119.89 MSEC
LA MAJOR: 5.39 CM
LA MINOR: 5.23 CM
LA WIDTH: 5.06 CM
LEFT ATRIUM SIZE: 4.6 CM
LEFT ATRIUM VOLUME INDEX MOD: 44.1 ML/M2
LEFT ATRIUM VOLUME INDEX: 48.6 ML/M2
LEFT ATRIUM VOLUME MOD: 95.31 CM3
LEFT ATRIUM VOLUME: 105.03 CM3
LEFT INTERNAL DIMENSION IN SYSTOLE: 3.03 CM (ref 2.1–4)
LEFT VENTRICLE DIASTOLIC VOLUME INDEX: 39.39 ML/M2
LEFT VENTRICLE DIASTOLIC VOLUME: 85.08 ML
LEFT VENTRICLE MASS INDEX: 107 G/M2
LEFT VENTRICLE SYSTOLIC VOLUME INDEX: 16.5 ML/M2
LEFT VENTRICLE SYSTOLIC VOLUME: 35.73 ML
LEFT VENTRICULAR INTERNAL DIMENSION IN DIASTOLE: 4.34 CM (ref 3.5–6)
LEFT VENTRICULAR MASS: 231.63 G
LV LATERAL E/E' RATIO: 10.5 M/S
LV SEPTAL E/E' RATIO: 21 M/S
MV A" WAVE DURATION": 18.55 MSEC
MV PEAK A VEL: 1.04 M/S
MV PEAK E VEL: 0.63 M/S
MV STENOSIS PRESSURE HALF TIME: 101.59 MS
MV VALVE AREA P 1/2 METHOD: 2.17 CM2
PISA TR MAX VEL: 2.59 M/S
PULM VEIN S/D RATIO: 1.79
PV PEAK D VEL: 0.24 M/S
PV PEAK S VEL: 0.43 M/S
RA MAJOR: 4.19 CM
RA PRESSURE: 8 MMHG
RA WIDTH: 2.97 CM
RIGHT VENTRICULAR END-DIASTOLIC DIMENSION: 3.36 CM
RV TISSUE DOPPLER FREE WALL SYSTOLIC VELOCITY 1 (APICAL 4 CHAMBER VIEW): 10.26 CM/S
SINUS: 3.58 CM
STJ: 3.34 CM
TDI LATERAL: 0.06 M/S
TDI SEPTAL: 0.03 M/S
TDI: 0.05 M/S
TR MAX PG: 27 MMHG
TRICUSPID ANNULAR PLANE SYSTOLIC EXCURSION: 2.45 CM
TV REST PULMONARY ARTERY PRESSURE: 35 MMHG

## 2022-01-25 PROCEDURE — 93306 ECHO (CUPID ONLY): ICD-10-PCS | Mod: 26,HCNC,, | Performed by: INTERNAL MEDICINE

## 2022-01-25 PROCEDURE — 93306 TTE W/DOPPLER COMPLETE: CPT | Mod: HCNC

## 2022-01-25 PROCEDURE — 93306 TTE W/DOPPLER COMPLETE: CPT | Mod: 26,HCNC,, | Performed by: INTERNAL MEDICINE

## 2022-01-26 ENCOUNTER — PATIENT OUTREACH (OUTPATIENT)
Dept: ADMINISTRATIVE | Facility: OTHER | Age: 82
End: 2022-01-26
Payer: MEDICARE

## 2022-01-28 DIAGNOSIS — E11.42 DIABETIC POLYNEUROPATHY ASSOCIATED WITH TYPE 2 DIABETES MELLITUS: ICD-10-CM

## 2022-01-28 DIAGNOSIS — E11.42 TYPE 2 DIABETES MELLITUS WITH DIABETIC POLYNEUROPATHY, WITH LONG-TERM CURRENT USE OF INSULIN: ICD-10-CM

## 2022-01-28 DIAGNOSIS — I10 ESSENTIAL HYPERTENSION: Chronic | ICD-10-CM

## 2022-01-28 DIAGNOSIS — Z79.4 TYPE 2 DIABETES MELLITUS WITH DIABETIC POLYNEUROPATHY, WITH LONG-TERM CURRENT USE OF INSULIN: ICD-10-CM

## 2022-01-28 DIAGNOSIS — Z87.898 HISTORY OF WHEEZING: ICD-10-CM

## 2022-01-28 DIAGNOSIS — R07.9 CHEST PAIN, UNSPECIFIED TYPE: ICD-10-CM

## 2022-01-28 DIAGNOSIS — E78.00 HYPERCHOLESTEROLEMIA: ICD-10-CM

## 2022-01-28 DIAGNOSIS — I25.119 CORONARY ARTERY DISEASE INVOLVING NATIVE CORONARY ARTERY OF NATIVE HEART WITH ANGINA PECTORIS: ICD-10-CM

## 2022-01-28 RX ORDER — INSULIN ASPART 100 [IU]/ML
INJECTION, SOLUTION INTRAVENOUS; SUBCUTANEOUS
Qty: 90 ML | Refills: 0 | OUTPATIENT
Start: 2022-01-28

## 2022-01-28 RX ORDER — ATORVASTATIN CALCIUM 40 MG/1
TABLET, FILM COATED ORAL
Qty: 90 TABLET | Refills: 0 | OUTPATIENT
Start: 2022-01-28

## 2022-01-28 RX ORDER — AMLODIPINE BESYLATE 5 MG/1
5 TABLET ORAL DAILY
Qty: 90 TABLET | Refills: 0 | Status: SHIPPED | OUTPATIENT
Start: 2022-01-28 | End: 2022-04-14

## 2022-01-28 RX ORDER — AMLODIPINE BESYLATE 5 MG/1
TABLET ORAL
Qty: 90 TABLET | Refills: 0 | OUTPATIENT
Start: 2022-01-28

## 2022-01-28 RX ORDER — FUROSEMIDE 40 MG/1
TABLET ORAL
Qty: 180 TABLET | Refills: 1 | Status: SHIPPED | OUTPATIENT
Start: 2022-01-28 | End: 2022-03-22 | Stop reason: SDUPTHER

## 2022-01-28 RX ORDER — ALBUTEROL SULFATE 90 UG/1
AEROSOL, METERED RESPIRATORY (INHALATION)
Refills: 0 | OUTPATIENT
Start: 2022-01-28

## 2022-01-28 RX ORDER — FUROSEMIDE 40 MG/1
TABLET ORAL
Qty: 180 TABLET | Refills: 0 | OUTPATIENT
Start: 2022-01-28

## 2022-01-28 NOTE — TELEPHONE ENCOUNTER
No new care gaps identified.  Powered by [employer] by [facility]. Reference number: [id number].   1/28/2022 10:28:29 AM CST

## 2022-01-28 NOTE — TELEPHONE ENCOUNTER
No new care gaps identified.  Powered by [employer] by [employer]. Reference number: [id number].   1/28/2022 10:36:59 AM CST

## 2022-01-28 NOTE — TELEPHONE ENCOUNTER
[facility] Note  Quick DC. Inappropriate Request   Refill request requires further review by MD: NO   Medication Therapy Plan: Pharmacy is requesting new script(s) for the following medications without required information, (sig/ frequency/qty/etc)     [facility] action(s):  Quick Discontinue      Duplicate Pended Encounter(s)/ Last Prescribed Details:    Pharmacies have been requesting medications for patients without required information, (sig, frequency, qty, etc.). In addition, requests are sent for medication(s) pt. are currently not taking, and medications patients have never taken.    We have spoken to the pharmacies about these request types and advised their teams previously that we are unable to assess these New Script requests and require all details for these requests. This is a known issue and has been reported.        Medication related problems are not assessed for QDC.   Medication Reconciliation Completed? NO Were there pending details that required adjustment? NO     Automatic Epic Generated Protocol Data Below:   Requested Prescriptions   Pending Prescriptions Disp Refills    insulin aspart U-100 (NOVOLOG FLEXPEN U-100 INSULIN) 100 unit/mL (3 mL) InPn pen [Pharmacy Med Name: NOVOLOG U100 FLEXPEN INJ ([id number])] 90 mL 0              Appointments      Date Provider   Last Visit   1/10/2022 Jeannine Arriaga MD   Next Visit   1/28/2022 Jeannine Arriaga MD        Note composed:10:44 AM 01/28/2022

## 2022-01-28 NOTE — TELEPHONE ENCOUNTER
[facility] Note  Quick DC. Inappropriate Request   Refill request requires further review by MD: NO   Medication Therapy Plan: Pharmacy is requesting new script(s) for the following medications without required information, (sig/ frequency/qty/etc)     [facility] action(s):  Quick Discontinue      Duplicate Pended Encounter(s)/ Last Prescribed Details:    Pharmacies have been requesting medications for patients without required information, (sig, frequency, qty, etc.). In addition, requests are sent for medication(s) pt. are currently not taking, and medications patients have never taken.    We have spoken to the pharmacies about these request types and advised their teams previously that we are unable to assess these New Script requests and require all details for these requests. This is a known issue and has been reported.        Medication related problems are not assessed for QDC.   Medication Reconciliation Completed? NO Were there pending details that required adjustment? NO     Automatic Epic Generated Protocol Data Below:   Requested Prescriptions   Pending Prescriptions Disp Refills    insulin aspart U-100 (NOVOLOG FLEXPEN U-100 INSULIN) 100 unit/mL (3 mL) InPn pen [Pharmacy Med Name: NOVOLOG U100 FLEXPEN INJ ([id number])] 90 mL 0              Appointments      Date Provider   Last Visit   1/10/2022 Jeannine Arriaga MD   Next Visit   1/28/2022 Jeannine Arriaga MD        Note composed:10:44 AM 01/28/2022

## 2022-01-28 NOTE — TELEPHONE ENCOUNTER
No new care gaps identified.  Powered by [employer] by [employer] Reference number: [id number].   1/28/2022 10:30:11 AM CST

## 2022-01-28 NOTE — TELEPHONE ENCOUNTER
Provider Staff:     Action required for this patient.    Please note Refusal of medication.            Requested Prescriptions     Refused Prescriptions Disp Refills    amLODIPine (NORVASC) 5 MG tablet [Pharmacy Med Name: AMLODIPINE  5MG TAB] 90 tablet 0     Refused By: FEDERICO MARCIAL     Reason for Refusal: Request already responded to by other means (e.g. phone or fax)      Thanks!  [facility]   Note composed: 01/28/2022 1:52 PM

## 2022-01-28 NOTE — TELEPHONE ENCOUNTER
[facility] Note  Quick DC. Inappropriate Request   Refill request requires further review by MD: NO   Medication Therapy Plan: Pharmacy is requesting new script(s) for the following medications without required information, (sig/ frequency/qty/etc)     [facility] action(s):  Quick Discontinue      Duplicate Pended Encounter(s)/ Last Prescribed Details:    Pharmacies have been requesting medications for patients without required information, (sig, frequency, qty, etc.). In addition, requests are sent for medication(s) pt. are currently not taking, and medications patients have never taken.    We have spoken to the pharmacies about these request types and advised their teams previously that we are unable to assess these New Script requests and require all details for these requests. This is a known issue and has been reported.        Medication related problems are not assessed for QDC.   Medication Reconciliation Completed? NO Were there pending details that required adjustment? NO     Automatic Epic Generated Protocol Data Below:   Requested Prescriptions   Pending Prescriptions Disp Refills    insulin aspart U-100 (NOVOLOG FLEXPEN U-100 INSULIN) 100 unit/mL (3 mL) InPn pen [Pharmacy Med Name: NOVOLOG U100 FLEXPEN INJ ([id number])] 90 mL 0              Appointments      Date Provider   Last Visit   1/10/2022 Jeannine Arriaga MD   Next Visit   1/28/2022 Jeannine Arriaga MD        Note composed:10:45 AM 01/28/2022

## 2022-01-28 NOTE — TELEPHONE ENCOUNTER
No new care gaps identified.  Powered by [employer] by [employer]. Reference number: [id number].   1/28/2022 10:29:18 AM CST

## 2022-01-28 NOTE — TELEPHONE ENCOUNTER
Provider Staff:     Action required for this patient.    Please note Refusal of medication.            Requested Prescriptions     Refused Prescriptions Disp Refills    furosemide (LASIX) 40 MG tablet [Pharmacy Med Name: FUROSEMIDE 40MG TAB] 180 tablet 0     Refused By: FEDERICO MARCIAL     Reason for Refusal: Request already responded to by other means (e.g. phone or fax)      Thanks!  [facility]   Note composed: 01/28/2022 1:51 PM

## 2022-01-28 NOTE — TELEPHONE ENCOUNTER
No new care gaps identified.  Powered by [employer]. Reference number: [id number].   1/28/2022 10:45:36 AM CST

## 2022-01-28 NOTE — TELEPHONE ENCOUNTER
[facility] Note  Quick DC. Inappropriate Request   Refill request requires further review by MD: NO   Medication Therapy Plan: Pharmacy is requesting new script(s) for the following medications without required information, (sig/ frequency/qty/etc)     [facility] action(s):  Quick Discontinue      Duplicate Pended Encounter(s)/ Last Prescribed Details:    Pharmacies have been requesting medications for patients without required information, (sig, frequency, qty, etc.). In addition, requests are sent for medication(s) pt. are currently not taking, and medications patients have never taken.    We have spoken to the pharmacies about these request types and advised their teams previously that we are unable to assess these New Script requests and require all details for these requests. This is a known issue and has been reported.        Medication related problems are not assessed for QDC.   Medication Reconciliation Completed? NO Were there pending details that required adjustment? NO     Automatic Epic Generated Protocol Data Below:   Requested Prescriptions   Pending Prescriptions Disp Refills    albuterol (PROVENTIL/VENTOLIN HFA) 90 mcg/actuation inhaler [Pharmacy Med Name: ALBUTEROL SULF HFA 90MCG/ACT AER 6.7GM (GEN FOR PROVENTIL)]  0              Appointments      Date Provider   Last Visit   1/10/2022 Jeannine Arriaga MD   Next Visit   1/28/2022 Jeannine Arriaga MD        Note composed:10:44 AM 01/28/2022

## 2022-01-28 NOTE — TELEPHONE ENCOUNTER
No new care gaps identified.  Powered by [employer] by [email or web address]. Reference number: [id number].   1/28/2022 10:33:33 AM CST

## 2022-01-28 NOTE — TELEPHONE ENCOUNTER
No new care gaps identified.  Powered by [employer] by [employer]. Reference number: [id number].   1/28/2022 10:31:03 AM CST

## 2022-01-28 NOTE — TELEPHONE ENCOUNTER
No new care gaps identified.  Powered by [employer] by [employer]. Reference number: [id number].   1/28/2022 10:37:49 AM CST

## 2022-01-28 NOTE — TELEPHONE ENCOUNTER
[facility] Note  Quick DC. Inappropriate Request   Refill request requires further review by MD: NO   Medication Therapy Plan: Pharmacy is requesting new script(s) for the following medications without required information, (sig/ frequency/qty/etc)     [facility] action(s):  Quick Discontinue      Duplicate Pended Encounter(s)/ Last Prescribed Details:    Pharmacies have been requesting medications for patients without required information, (sig, frequency, qty, etc.). In addition, requests are sent for medication(s) pt. are currently not taking, and medications patients have never taken.    We have spoken to the pharmacies about these request types and advised their teams previously that we are unable to assess these New Script requests and require all details for these requests. This is a known issue and has been reported.        Medication related problems are not assessed for QDC.   Medication Reconciliation Completed? NO Were there pending details that required adjustment? NO     Automatic Epic Generated Protocol Data Below:   Requested Prescriptions   Pending Prescriptions Disp Refills    atorvastatin (LIPITOR) 40 MG tablet [Pharmacy Med Name: ATORVASTATIN 40MG TAB] 90 tablet 0              Appointments      Date Provider   Last Visit   1/10/2022 Jeannine Arriaga MD   Next Visit   1/28/2022 Jeannine Arriaga MD        Note composed:10:44 AM 01/28/2022

## 2022-01-28 NOTE — TELEPHONE ENCOUNTER
COVID-19 Screening:    • Does the patient OR patient’s household members have any of the following symptoms?  o Temperature: Fever ?100.0°F or ?37.8°C?  No  o Respiratory symptoms: New or worsening cough, shortness of breath, difficulty breathing, or sore throat? No  o GI symptoms: New onset of nausea, vomiting or diarrhea?  No  o Miscellaneous: New onset of loss of taste or smell, chills, repeated shaking with chills, muscle pain, headache, congestion or runny nose?  No  • Has the patient or a household member tested positive for COVID-19 in the last 14 days?  No  • Has the patient or a household member been tested for COVID-19 and are waiting for the results?  No     [facility] Note  Quick DC. Inappropriate Request   Refill request requires further review by MD: NO   Medication Therapy Plan: Pharmacy is requesting new script(s) for the following medications without required information, (sig/ frequency/qty/etc)     [facility] action(s):  Quick Discontinue      Duplicate Pended Encounter(s)/ Last Prescribed Details:    Pharmacies have been requesting medications for patients without required information, (sig, frequency, qty, etc.). In addition, requests are sent for medication(s) pt. are currently not taking, and medications patients have never taken.    We have spoken to the pharmacies about these request types and advised their teams previously that we are unable to assess these New Script requests and require all details for these requests. This is a known issue and has been reported.        Medication related problems are not assessed for QDC.   Medication Reconciliation Completed? NO Were there pending details that required adjustment? NO     Automatic Epic Generated Protocol Data Below:   Requested Prescriptions   Pending Prescriptions Disp Refills    albuterol (PROVENTIL/VENTOLIN HFA) 90 mcg/actuation inhaler [Pharmacy Med Name: ALBUTEROL SULF HFA 90MCG/ACT AER 6.7GM (GEN FOR PROVENTIL)]  0              Appointments      Date Provider   Last Visit   1/10/2022 Jeannine Arriaga MD   Next Visit   1/28/2022 Jeannine Arriaga MD        Note composed:10:44 AM 01/28/2022

## 2022-02-07 ENCOUNTER — TELEPHONE (OUTPATIENT)
Dept: CARDIOLOGY | Facility: HOSPITAL | Age: 82
End: 2022-02-07
Payer: MEDICARE

## 2022-02-09 ENCOUNTER — OFFICE VISIT (OUTPATIENT)
Dept: INTERNAL MEDICINE | Facility: CLINIC | Age: 82
End: 2022-02-09
Payer: MEDICARE

## 2022-02-09 ENCOUNTER — HOSPITAL ENCOUNTER (OUTPATIENT)
Dept: CARDIOLOGY | Facility: HOSPITAL | Age: 82
Discharge: HOME | End: 2022-02-09
Attending: STUDENT IN AN ORGANIZED HEALTH CARE EDUCATION/TRAINING PROGRAM
Payer: MEDICARE

## 2022-02-09 VITALS
WEIGHT: 229 LBS | HEART RATE: 72 BPM | SYSTOLIC BLOOD PRESSURE: 153 MMHG | BODY MASS INDEX: 34.71 KG/M2 | HEIGHT: 68 IN | DIASTOLIC BLOOD PRESSURE: 80 MMHG

## 2022-02-09 VITALS
BODY MASS INDEX: 34.1 KG/M2 | WEIGHT: 225 LBS | HEIGHT: 68 IN | HEART RATE: 74 BPM | OXYGEN SATURATION: 97 % | DIASTOLIC BLOOD PRESSURE: 80 MMHG | SYSTOLIC BLOOD PRESSURE: 132 MMHG

## 2022-02-09 DIAGNOSIS — I10 ESSENTIAL HYPERTENSION: Primary | Chronic | ICD-10-CM

## 2022-02-09 DIAGNOSIS — M10.9 GOUT, ARTHRITIS: ICD-10-CM

## 2022-02-09 DIAGNOSIS — K21.9 GASTROESOPHAGEAL REFLUX DISEASE WITHOUT ESOPHAGITIS: ICD-10-CM

## 2022-02-09 DIAGNOSIS — F39 MOOD DISORDER: ICD-10-CM

## 2022-02-09 DIAGNOSIS — E11.42 TYPE 2 DIABETES MELLITUS WITH DIABETIC POLYNEUROPATHY, WITH LONG-TERM CURRENT USE OF INSULIN: ICD-10-CM

## 2022-02-09 DIAGNOSIS — M48.062 SPINAL STENOSIS OF LUMBAR REGION WITH NEUROGENIC CLAUDICATION: ICD-10-CM

## 2022-02-09 DIAGNOSIS — Z87.898 HISTORY OF WHEEZING: ICD-10-CM

## 2022-02-09 DIAGNOSIS — R07.89 OTHER CHEST PAIN: ICD-10-CM

## 2022-02-09 DIAGNOSIS — E78.2 MIXED HYPERLIPIDEMIA: ICD-10-CM

## 2022-02-09 DIAGNOSIS — Z79.4 TYPE 2 DIABETES MELLITUS WITH DIABETIC POLYNEUROPATHY, WITH LONG-TERM CURRENT USE OF INSULIN: ICD-10-CM

## 2022-02-09 LAB
CFR FLOW - ANTERIOR: 1.26
CFR FLOW - INFERIOR: 1.43
CFR FLOW - LATERAL: 1.43
CFR FLOW - MAX: 1.92
CFR FLOW - MIN: 0.77
CFR FLOW - SEPTAL: 1.26
CFR FLOW - WHOLE HEART: 1.35
CV PHARM DOSE: 58.3 MG
CV STRESS BASE HR: 72 BPM
DIASTOLIC BLOOD PRESSURE: 80 MMHG
EJECTION FRACTION- HIGH: 65 %
END DIASTOLIC INDEX-HIGH: 153 ML/M2
END DIASTOLIC INDEX-LOW: 93 ML/M2
END SYSTOLIC INDEX-HIGH: 71 ML/M2
END SYSTOLIC INDEX-LOW: 31 ML/M2
NUC REST DIASTOLIC VOLUME INDEX: 113
NUC REST EJECTION FRACTION: 42
NUC REST SYSTOLIC VOLUME INDEX: 65
NUC STRESS DIASTOLIC VOLUME INDEX: 119
NUC STRESS EJECTION FRACTION: 56 %
NUC STRESS SYSTOLIC VOLUME INDEX: 52
OHS CV CPX 85 PERCENT MAX PREDICTED HEART RATE MALE: 115
OHS CV CPX MAX PREDICTED HEART RATE: 135
OHS CV CPX PATIENT IS FEMALE: 1
OHS CV CPX PATIENT IS MALE: 0
OHS CV CPX PEAK DIASTOLIC BLOOD PRESSURE: 50 MMHG
OHS CV CPX PEAK HEAR RATE: 73 BPM
OHS CV CPX PEAK RATE PRESSURE PRODUCT: 9709
OHS CV CPX PEAK SYSTOLIC BLOOD PRESSURE: 133 MMHG
OHS CV CPX PERCENT MAX PREDICTED HEART RATE ACHIEVED: 54
OHS CV CPX RATE PRESSURE PRODUCT PRESENTING: NORMAL
REST FLOW - ANTERIOR: 1.52 CC/MIN/G
REST FLOW - INFERIOR: 1.14 CC/MIN/G
REST FLOW - LATERAL: 1.4 CC/MIN/G
REST FLOW - MAX: 1.89 CC/MIN/G
REST FLOW - MIN: 0.61 CC/MIN/G
REST FLOW - SEPTAL: 1.27 CC/MIN/G
REST FLOW - WHOLE HEART: 1.33 CC/MIN/G
RETIRED EF AND QEF - SEE NOTES: 53 %
STRESS FLOW - ANTERIOR: 1.91 CC/MIN/G
STRESS FLOW - INFERIOR: 1.62 CC/MIN/G
STRESS FLOW - LATERAL: 1.99 CC/MIN/G
STRESS FLOW - MAX: 2.3 CC/MIN/G
STRESS FLOW - MIN: 0.57 CC/MIN/G
STRESS FLOW - SEPTAL: 1.62 CC/MIN/G
STRESS FLOW - WHOLE HEART: 1.79 CC/MIN/G
SYSTOLIC BLOOD PRESSURE: 153 MMHG

## 2022-02-09 PROCEDURE — 93018 CARDIAC PET SCAN STRESS (CUPID ONLY): ICD-10-PCS | Mod: HCNC,,, | Performed by: INTERNAL MEDICINE

## 2022-02-09 PROCEDURE — 78434 AQMBF PET REST & RX STRESS: CPT | Mod: 26,HCNC,, | Performed by: INTERNAL MEDICINE

## 2022-02-09 PROCEDURE — 93016 CV STRESS TEST SUPVJ ONLY: CPT | Mod: HCNC,,, | Performed by: INTERNAL MEDICINE

## 2022-02-09 PROCEDURE — 93016 CARDIAC PET SCAN STRESS (CUPID ONLY): ICD-10-PCS | Mod: HCNC,,, | Performed by: INTERNAL MEDICINE

## 2022-02-09 PROCEDURE — 1125F PR PAIN SEVERITY QUANTIFIED, PAIN PRESENT: ICD-10-PCS | Mod: HCNC,CPTII,S$GLB, | Performed by: INTERNAL MEDICINE

## 2022-02-09 PROCEDURE — 3075F PR MOST RECENT SYSTOLIC BLOOD PRESS GE 130-139MM HG: ICD-10-PCS | Mod: HCNC,CPTII,S$GLB, | Performed by: INTERNAL MEDICINE

## 2022-02-09 PROCEDURE — 63600175 PHARM REV CODE 636 W HCPCS: Mod: HCNC | Performed by: STUDENT IN AN ORGANIZED HEALTH CARE EDUCATION/TRAINING PROGRAM

## 2022-02-09 PROCEDURE — 78434 PR PET, ABS QUANT MYOCARD BLOOD FLOW, REST/STRESS: ICD-10-PCS | Mod: 26,HCNC,, | Performed by: INTERNAL MEDICINE

## 2022-02-09 PROCEDURE — 1159F PR MEDICATION LIST DOCUMENTED IN MEDICAL RECORD: ICD-10-PCS | Mod: HCNC,CPTII,S$GLB, | Performed by: INTERNAL MEDICINE

## 2022-02-09 PROCEDURE — 1101F PR PT FALLS ASSESS DOC 0-1 FALLS W/OUT INJ PAST YR: ICD-10-PCS | Mod: HCNC,CPTII,S$GLB, | Performed by: INTERNAL MEDICINE

## 2022-02-09 PROCEDURE — 1160F PR REVIEW ALL MEDS BY PRESCRIBER/CLIN PHARMACIST DOCUMENTED: ICD-10-PCS | Mod: HCNC,CPTII,S$GLB, | Performed by: INTERNAL MEDICINE

## 2022-02-09 PROCEDURE — 3075F SYST BP GE 130 - 139MM HG: CPT | Mod: HCNC,CPTII,S$GLB, | Performed by: INTERNAL MEDICINE

## 2022-02-09 PROCEDURE — 99999 PR PBB SHADOW E&M-EST. PATIENT-LVL II: CPT | Mod: PBBFAC,HCNC,, | Performed by: INTERNAL MEDICINE

## 2022-02-09 PROCEDURE — 78431 CARDIAC PET SCAN STRESS (CUPID ONLY): ICD-10-PCS | Mod: 26,HCNC,, | Performed by: INTERNAL MEDICINE

## 2022-02-09 PROCEDURE — 93018 CV STRESS TEST I&R ONLY: CPT | Mod: HCNC,,, | Performed by: INTERNAL MEDICINE

## 2022-02-09 PROCEDURE — 78434 AQMBF PET REST & RX STRESS: CPT | Mod: HCNC

## 2022-02-09 PROCEDURE — 1160F RVW MEDS BY RX/DR IN RCRD: CPT | Mod: HCNC,CPTII,S$GLB, | Performed by: INTERNAL MEDICINE

## 2022-02-09 PROCEDURE — 78431 MYOCRD IMG PET RST&STRS CT: CPT | Mod: 26,HCNC,, | Performed by: INTERNAL MEDICINE

## 2022-02-09 PROCEDURE — 3288F FALL RISK ASSESSMENT DOCD: CPT | Mod: HCNC,CPTII,S$GLB, | Performed by: INTERNAL MEDICINE

## 2022-02-09 PROCEDURE — 3288F PR FALLS RISK ASSESSMENT DOCUMENTED: ICD-10-PCS | Mod: HCNC,CPTII,S$GLB, | Performed by: INTERNAL MEDICINE

## 2022-02-09 PROCEDURE — 3079F DIAST BP 80-89 MM HG: CPT | Mod: HCNC,CPTII,S$GLB, | Performed by: INTERNAL MEDICINE

## 2022-02-09 PROCEDURE — 3079F PR MOST RECENT DIASTOLIC BLOOD PRESSURE 80-89 MM HG: ICD-10-PCS | Mod: HCNC,CPTII,S$GLB, | Performed by: INTERNAL MEDICINE

## 2022-02-09 PROCEDURE — 99214 PR OFFICE/OUTPT VISIT, EST, LEVL IV, 30-39 MIN: ICD-10-PCS | Mod: HCNC,S$GLB,, | Performed by: INTERNAL MEDICINE

## 2022-02-09 PROCEDURE — 1125F AMNT PAIN NOTED PAIN PRSNT: CPT | Mod: HCNC,CPTII,S$GLB, | Performed by: INTERNAL MEDICINE

## 2022-02-09 PROCEDURE — 1101F PT FALLS ASSESS-DOCD LE1/YR: CPT | Mod: HCNC,CPTII,S$GLB, | Performed by: INTERNAL MEDICINE

## 2022-02-09 PROCEDURE — 99214 OFFICE O/P EST MOD 30 MIN: CPT | Mod: HCNC,S$GLB,, | Performed by: INTERNAL MEDICINE

## 2022-02-09 PROCEDURE — 1159F MED LIST DOCD IN RCRD: CPT | Mod: HCNC,CPTII,S$GLB, | Performed by: INTERNAL MEDICINE

## 2022-02-09 PROCEDURE — 99999 PR PBB SHADOW E&M-EST. PATIENT-LVL II: ICD-10-PCS | Mod: PBBFAC,HCNC,, | Performed by: INTERNAL MEDICINE

## 2022-02-09 RX ORDER — AMINOPHYLLINE 25 MG/ML
75 INJECTION, SOLUTION INTRAVENOUS ONCE
Status: COMPLETED | OUTPATIENT
Start: 2022-02-09 | End: 2022-02-09

## 2022-02-09 RX ORDER — OXYCODONE AND ACETAMINOPHEN 10; 325 MG/1; MG/1
1 TABLET ORAL
Qty: 150 TABLET | Refills: 0 | Status: SHIPPED | OUTPATIENT
Start: 2022-02-09 | End: 2022-03-09 | Stop reason: SDUPTHER

## 2022-02-09 RX ORDER — ALBUTEROL SULFATE 90 UG/1
2 AEROSOL, METERED RESPIRATORY (INHALATION) EVERY 4 HOURS PRN
Qty: 18 G | Refills: 1 | Status: SHIPPED | OUTPATIENT
Start: 2022-02-09 | End: 2022-04-28 | Stop reason: SDUPTHER

## 2022-02-09 RX ORDER — DIPYRIDAMOLE 5 MG/ML
58.29 INJECTION INTRAVENOUS ONCE
Status: COMPLETED | OUTPATIENT
Start: 2022-02-09 | End: 2022-02-09

## 2022-02-09 RX ADMIN — AMINOPHYLLINE 75 MG: 25 INJECTION, SOLUTION INTRAVENOUS at 10:02

## 2022-02-09 RX ADMIN — DIPYRIDAMOLE 58.3 MG: 5 INJECTION INTRAVENOUS at 10:02

## 2022-02-09 NOTE — PROGRESS NOTES
CHIEF COMPLAINT:  Follow up of multple problems.      HISTORY OF PRESENT ILLNESS: This is a 81-year-old woman who presents for follow up of above.    Today she complains of bilateral shoulder pain. Has been going on the last several months.  Pain is getting worse.  SHe has pain with raising her arms over her head. She complains of back pain and body pain  which has been severe. She is taking oxycodone apap 10/325 up to 5 times daily.     She has had leg swelling.     She saw Dr Reilly on 22 - she continues to take Eliquis 5 mg wice daily.  She has not been wearing her compression stockings- too hard to get on. She had cardiac PET stress test today - results are pending.  He increase her amlodipine to 10 mg daily       She does not have her medications with her today.      SHe continues to take duloxetine 60 mg twice daily. Her mood is up and down depending upon her pain. Her sister who lives in [place]  early  - age 71.   She had a heart attack and  before the ambulance could get to her.  Mrs Villasenor would speak with this sister every day.       No fever or chills. She has felt cold.  No nausea, vomiting, constipation, dysuria, hematuira.  She denies swallowing problems today.  She occasionally has pain when she urinates. Vaseline to the vaginal area helps.  Occasional diarrhea      She is living alone.  Her grandson has been staying with her. He goes to work very early in the morning. She does not see her grandson very  much        She is taking  amlodipine 10 mg daily, coreg 25 mg twice daily and lasix 40 mg 1 tablet twice daily for her hypertension and diastolic dysfunction.      She has gout. She is taking allopurinol 100 mg daily      She saw Endocrinology on 10/22/20. She should take Lantus 60 units daily and Novolog 24 units once or twice daily with meals.  Blood sugar has been up and down. 120-245.  Rarely goes to 80.      PAST MEDICAL HISTORY:   1. Diabetes mellitus   2. Hypertension   3.  Hyperlipidemia   4. Left heart catheterization January 2007 which revealed luminal irregularities in the LAD, left circumflex and right coronary artery. She had diastolic dysfunction and patent renal arteries.   5. Sleep apnea   6. Obesity.   7. Nephrolithiasis status post lithotripsy.   8. Reflux - had nonerosive gastropathy on EGD September 2007. Gastritis on EGD 9/2010  9. Hidradenitis suppurativa.   10. History of diverticulitis with a hospitalized October 2007.   11. Migraines   12. Obesity.   13. 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    14. Total hysterectomy in 1969.   15. Cholecystectomy was done at that time as well.   16. 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   17. Colon polyps on colonscopy 11/2010 - due 2013   18. Hospitalization 12/10 for e coli sepsis due to left ureteral stone with left nephrostomy tube in [place], [place]   19. Left knee replacement 9/2012      MEDICATIONS and ALLERGIES: Updated on EPIC.     PHYSICAL EXAMINATION:       GENERAL: She is alert, oriented, no apparent distress. Affect within   normal limits.  Conjunctiva anicteric.    NECK: Supple.   Respiratory: Effort normal. Lungs clear  HEART: Regular rate and rhythm without murmurs, gallops or rubs.   No lower extremity edema.             ASSESSMENT AND PLAN:      1. Hypertension with diastolic heart failure- Had PET stress today.   2.  OA neck and lumbar spine - get back to physical therapy  3.  Gout - on allopurinol.   4.  Mood disorder -  on duloxetine  5.  Swallowing disorder - s/p EGD with dilation on 11/11/21.   6. Asthma - stable   7. Gerd -on pantoprazole 40 mg twice daily - refilled  8. Diabetes with neuropathy- watch sugars- discussed diet. Novolog refilled  9. Hyperlipidemia - on  lipitor  10. Allergic rhinitis - stable  12. CRI -stable on labs  13. Obesity - discussed diet, exercise and weight loss  14. CAD -risk factor modification  15.  Aortic atherosclerosis and possible mesenteric artery stenosis- risk factor modification  16. Tortuous aorta - HTN controlled  17. Colon polyps - Colonoscopy in 8/2013 - 2 polyps. Flex sig 11/2013 - mild granular mucosa. Colonoscopy 6/2017 diverticulosis - no polyps due 2023.   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 .Restless leg syndrome - on roprinole    20. Morbid obestiy - working on diet.   .  MMG 6/21    I will see her back in 1 month sooner if problems arise     Niece is Kacie Gaten - encouraged her to have her niece come help her with medication management. I suspect that she is not taking her medications correctly which is contributing to her problems. ALso suspect depression as a main reason for her symptoms.   Stressed importance of taking duloxetine regularly.

## 2022-02-16 ENCOUNTER — TELEPHONE (OUTPATIENT)
Dept: INTERNAL MEDICINE | Facility: CLINIC | Age: 82
End: 2022-02-16
Payer: MEDICARE

## 2022-02-16 NOTE — TELEPHONE ENCOUNTER
----- Message from Kailyn Bills sent at 2/16/2022  8:05 AM CST -----  Contact: [phone]  Pt is asking for a cough syrup to be prescribed she has a really bad cough.. please advise and call back

## 2022-02-16 NOTE — TELEPHONE ENCOUNTER
last night, pt has a persistent cough. Pt has tried OTC med's however it isn't working. Pt has no other symptoms besides a dry cough. Pt rizwan like a Rx called in for this.

## 2022-02-17 NOTE — TELEPHONE ENCOUNTER
----- Message from Kelle Ackerman sent at 2/17/2022 11:56 AM CST -----  Contact: Self [phone]  Pt requesting an Rx or recommendation for meds for coughing, vomiting and congestion.    [id number] [address] AT [facility] [address]  [facility]  [facility] [address]  Phone: [phone] Fax: [phone]    Please call and advise

## 2022-02-18 RX ORDER — PROMETHAZINE HYDROCHLORIDE AND DEXTROMETHORPHAN HYDROBROMIDE 6.25; 15 MG/5ML; MG/5ML
5 SYRUP ORAL EVERY 4 HOURS PRN
Qty: 240 ML | Refills: 1 | Status: SHIPPED | OUTPATIENT
Start: 2022-02-18 | End: 2022-04-28

## 2022-03-07 ENCOUNTER — PATIENT OUTREACH (OUTPATIENT)
Dept: ADMINISTRATIVE | Facility: OTHER | Age: 82
End: 2022-03-07
Payer: MEDICARE

## 2022-03-07 DIAGNOSIS — E11.42 TYPE 2 DIABETES MELLITUS WITH DIABETIC POLYNEUROPATHY, WITH LONG-TERM CURRENT USE OF INSULIN: Primary | ICD-10-CM

## 2022-03-07 DIAGNOSIS — Z79.4 TYPE 2 DIABETES MELLITUS WITH DIABETIC POLYNEUROPATHY, WITH LONG-TERM CURRENT USE OF INSULIN: Primary | ICD-10-CM

## 2022-03-08 ENCOUNTER — OFFICE VISIT (OUTPATIENT)
Dept: PODIATRY | Facility: CLINIC | Age: 82
End: 2022-03-08
Payer: MEDICARE

## 2022-03-08 VITALS
DIASTOLIC BLOOD PRESSURE: 72 MMHG | HEART RATE: 78 BPM | HEIGHT: 68 IN | SYSTOLIC BLOOD PRESSURE: 135 MMHG | BODY MASS INDEX: 33.95 KG/M2 | WEIGHT: 224 LBS

## 2022-03-08 DIAGNOSIS — L84 CORN OR CALLUS: ICD-10-CM

## 2022-03-08 DIAGNOSIS — E11.42 DIABETIC POLYNEUROPATHY ASSOCIATED WITH TYPE 2 DIABETES MELLITUS: Primary | ICD-10-CM

## 2022-03-08 DIAGNOSIS — B35.1 ONYCHOMYCOSIS DUE TO DERMATOPHYTE: ICD-10-CM

## 2022-03-08 DIAGNOSIS — I87.2 VENOUS INSUFFICIENCY OF BOTH LOWER EXTREMITIES: ICD-10-CM

## 2022-03-08 PROCEDURE — 11721 ROUTINE FOOT CARE: ICD-10-PCS | Mod: Q9,S$GLB,, | Performed by: PODIATRIST

## 2022-03-08 PROCEDURE — 3078F PR MOST RECENT DIASTOLIC BLOOD PRESSURE < 80 MM HG: ICD-10-PCS | Mod: CPTII,S$GLB,, | Performed by: PODIATRIST

## 2022-03-08 PROCEDURE — 1159F PR MEDICATION LIST DOCUMENTED IN MEDICAL RECORD: ICD-10-PCS | Mod: CPTII,S$GLB,, | Performed by: PODIATRIST

## 2022-03-08 PROCEDURE — 1126F PR PAIN SEVERITY QUANTIFIED, NO PAIN PRESENT: ICD-10-PCS | Mod: CPTII,S$GLB,, | Performed by: PODIATRIST

## 2022-03-08 PROCEDURE — 99999 PR PBB SHADOW E&M-EST. PATIENT-LVL III: ICD-10-PCS | Mod: PBBFAC,,, | Performed by: PODIATRIST

## 2022-03-08 PROCEDURE — 1160F PR REVIEW ALL MEDS BY PRESCRIBER/CLIN PHARMACIST DOCUMENTED: ICD-10-PCS | Mod: CPTII,S$GLB,, | Performed by: PODIATRIST

## 2022-03-08 PROCEDURE — 3075F PR MOST RECENT SYSTOLIC BLOOD PRESS GE 130-139MM HG: ICD-10-PCS | Mod: CPTII,S$GLB,, | Performed by: PODIATRIST

## 2022-03-08 PROCEDURE — 99499 UNLISTED E&M SERVICE: CPT | Mod: S$GLB,,, | Performed by: PODIATRIST

## 2022-03-08 PROCEDURE — 99499 NO LOS: ICD-10-PCS | Mod: S$GLB,,, | Performed by: PODIATRIST

## 2022-03-08 PROCEDURE — 3078F DIAST BP <80 MM HG: CPT | Mod: CPTII,S$GLB,, | Performed by: PODIATRIST

## 2022-03-08 PROCEDURE — 11721 DEBRIDE NAIL 6 OR MORE: CPT | Mod: Q9,S$GLB,, | Performed by: PODIATRIST

## 2022-03-08 PROCEDURE — 99999 PR PBB SHADOW E&M-EST. PATIENT-LVL III: CPT | Mod: PBBFAC,,, | Performed by: PODIATRIST

## 2022-03-08 PROCEDURE — 1160F RVW MEDS BY RX/DR IN RCRD: CPT | Mod: CPTII,S$GLB,, | Performed by: PODIATRIST

## 2022-03-08 PROCEDURE — 3075F SYST BP GE 130 - 139MM HG: CPT | Mod: CPTII,S$GLB,, | Performed by: PODIATRIST

## 2022-03-08 PROCEDURE — 1126F AMNT PAIN NOTED NONE PRSNT: CPT | Mod: CPTII,S$GLB,, | Performed by: PODIATRIST

## 2022-03-08 PROCEDURE — 11056 PARNG/CUTG B9 HYPRKR LES 2-4: CPT | Mod: Q9,59,S$GLB, | Performed by: PODIATRIST

## 2022-03-08 PROCEDURE — 11056 ROUTINE FOOT CARE: ICD-10-PCS | Mod: Q9,59,S$GLB, | Performed by: PODIATRIST

## 2022-03-08 PROCEDURE — 1159F MED LIST DOCD IN RCRD: CPT | Mod: CPTII,S$GLB,, | Performed by: PODIATRIST

## 2022-03-08 NOTE — PROGRESS NOTES
Care Everywhere: updated  Immunization: updated  Health Maintenance: updated  Media Review:   Legacy Review:   DIS:  Order placed: hemoglobin   Upcoming appts:  EFAX:  Task Tickets:  Referrals:

## 2022-03-08 NOTE — PATIENT INSTRUCTIONS
How to Check Your Feet    Below are tips to help you look for foot problems. Try to check your feet at the same time each day, such as when you get out of bed in the morning.    Check the top of each foot. The tops of toes, back of the heel, and outer edge of the foot can get a lot of rubbing from poor-fitting shoes.    Check the bottom of each foot. Daily wear and tear often leads to problems at pressure spots.    Check the toes and nails. Fungal infections often occur between toes. Toenail problems can also be a sign of fungal infections or lead to breaks in the skin.    Check your shoes, too. Loose objects inside a shoe can injure the foot. Use your hand to feel inside your shoes for things like dani, loose stitching, or rough areas that could irritate your skin.        Diabetic Foot Care    Diabetes can lead to a number of different foot complications. Fortunately, most of these complications can be prevented with a little extra foot care. If diabetes is not well controlled, the high blood sugar can cause damage to blood vessels and result in poor circulation to the foot. When the skin does not get enough blood flow, it becomes prone to pressure sores and ulcers, which heal slowly.  High blood sugar can also damage nerves, interfering with the ability to feel pain and pressure. When you cant feel your foot normally, it is easy to injure your skin, bones and joints without knowing it. For these reasons diabetes increases the risk of fungal infections, bunions and ulcers. Deep ulcers can lead to bone infection. Gangrene is the most serious foot complication of diabetes. It usually occurs on the tips of the toes as blacked areas of skin. The black area is dead tissue. In severe cases, gangrene spreads to involve the entire toe, other toes and the entire foot. Foot or toe amputation may be required. Good foot care and blood sugar control can prevent this.    Home Care  Wear comfortable, proper fitting  shoes.  Wash your feet daily with warm water and mild soap.  After drying, apply a moisturizing cream or lotion.  Check your feet daily for skin breaks, blisters, swelling, or redness. Look between your toes also.  Wear cotton socks and change them every day.  Trim toe nails carefully and do not cut your cuticles.  Strive to keep your blood sugar under control with a combination of medicines, diet and activity.  If you smoke and have diabetes, it is very important that you stop. Smoking reduces blood flow to your foot.  Avoid activities that increase your risk of foot injury:  Do not walk barefoot.  Do not use heating pads or hot water bottles on your feet.  Do not put your foot in a hot tub without first checking the temperature with your hand.  10) Schedule yearly foot exams.    Follow Up  with your doctor or as advised by our staff. Report any cut, puncture, scrape, other injury, blister, ingrown toenail or ulcer on your foot.    Get Prompt Medical Attention  if any of the following occur:  -- Open ulcer with pus draining from the wound  -- Increasing foot or leg pain  -- New areas of redness or swelling or tender areas of the foot    © 7275-8649 [employer]. [address] [place] [address], [place] [address]. All rights reserved. This information is not intended as a substitute for professional medical care. Always follow your healthcare professional's instructions.

## 2022-03-08 NOTE — PROGRESS NOTES
"  Chief Complaint: Diabetic Foot Exam (/Jeannine Arriaga 2-9-22/)      HPI:  Henrietta is a 82 y.o. female who presents to the clinic for evaluation and treatment of high risk feet.   Chronic pain bilateral lower extremities, is going to pain management.     This patient has documented high risk feet requiring routine maintenance secondary to diabetes mellitis and those secondary complications of diabetes, as mentioned..       PCP: Jeannine Arriaga MD    Date Last Seen by PCP:  2/9/2022              Hemoglobin A1C   Date Value Ref Range Status   11/16/2021 9.7 (H) 4.0 - 5.6 % Final     Comment:     ADA Screening Guidelines:  5.7-6.4%  Consistent with prediabetes  >or=6.5%  Consistent with diabetes    High levels of fetal hemoglobin interfere with the HbA1C  assay. Heterozygous hemoglobin variants (HbS, HgC, etc)do  not significantly interfere with this assay.   However, presence of multiple variants may affect accuracy.     07/28/2021 8.8 (H) 4.0 - 5.6 % Final     Comment:     ADA Screening Guidelines:  5.7-6.4%  Consistent with prediabetes  >or=6.5%  Consistent with diabetes    High levels of fetal hemoglobin interfere with the HbA1C  assay. Heterozygous hemoglobin variants (HbS, HgC, etc)do  not significantly interfere with this assay.   However, presence of multiple variants may affect accuracy.     04/14/2021 9.1 (H) 4.0 - 5.6 % Final     Comment:     ADA Screening Guidelines:  5.7-6.4%  Consistent with prediabetes  >or=6.5%  Consistent with diabetes    High levels of fetal hemoglobin interfere with the HbA1C  assay. Heterozygous hemoglobin variants (HbS, HgC, etc)do  not significantly interfere with this assay.   However, presence of multiple variants may affect accuracy.               Objective:       Vitals:    03/08/22 1334   BP: 135/72   Pulse: 78   Weight: 101.6 kg (224 lb)   Height: 5' 8" (1.727 m)   PainSc: 0-No pain        Physical Exam  Vitals and nursing note reviewed.   Constitutional:       " Appearance: She is well-developed.   Cardiovascular:      Comments: Dorsalis pedis and posterior tibial pulses are diminished Bilaterally. Toes are cool to touch. Feet are warm proximally.There is decreased digital hair. Skin is atrophic, slightly hyperpigmented, and mildly edematous      Musculoskeletal:         General: No tenderness. Normal range of motion.      Comments: Adequate joint range of motion without pain, limitation, nor crepitation Bilateral feet and ankle joints. Muscle strength is 5/5 in all groups bilaterally.      semi reducible IPJ digital contracture 2nd toe b/l    Skin:     General: Skin is warm and dry.      Findings: No abrasion, bruising, burn, ecchymosis, erythema or lesion.      Comments: Nails x 10  are elongated by  4-8mm's, thickened by 2-3 mm's, dystrophic, and are darkened in  coloration . Xerosis Bilaterally. No open lesions noted.     Neurological:      Mental Status: She is alert and oriented to person, place, and time.      Comments: [place] 5.07 monofilamant testing is diminished Mynor feet. Sharp/dull sensation diminished Bilaterally. Light touch absent Bilaterally.       Psychiatric:         Behavior: Behavior normal.         11/23/2020 Impression:     BALA of 0.88 on the right indicating mild peripheral artery disease.  Left lower extremity BALA is within normal limits at 0.99.     No hemodynamically significant stenosis demonstrated in the left lower extremity arterial system.          Assessment:         ICD-10-CM ICD-9-CM    1. Venous insufficiency of both lower extremities  I87.2 459.81    2. Diabetic polyneuropathy associated with type 2 diabetes mellitus  E11.42 250.60      357.2    3. Corn or callus  L84 700    4. Onychomycosis due to dermatophyte  B35.1 110.1                Plan:         · I counseled the patient on her conditions, their implications and medical management.   Shoe inspection.      Continue good nutrition and blood sugar control to help prevent  podiatric complications of diabetes.    Maintain proper foot hygiene.    Continue wearing proper shoe gear, daily foot inspections, never walking without protective shoe gear, never putting sharp instruments to feet.        Routine Foot Care    Date/Time: 3/8/2022 1:30 PM  Performed by: Vero Schroeder DPM  Authorized by: Vero Schroeder DPM     Consent Done?:  Yes (Verbal)  Hyperkeratotic Skin Lesions?: Yes    Number of trimmed lesions:  2  Location(s):  Left 1st Toe and Right 1st Toe    Nail Care Type:  Debride  Location(s): All  (Left 1st Toe, Left 3rd Toe, Left 2nd Toe, Left 4th Toe, Left 5th Toe, Right 1st Toe, Right 2nd Toe, Right 3rd Toe, Right 4th Toe and Right 5th Toe)  Patient tolerance:  Patient tolerated the procedure well with no immediate complications     With patient's permission, the toenails mentioned above were reduced and debrided using a nail nipper, removing offending nail and debris.   Utilizing a #15 scalpel, I trimmed the corns and calluses at the above mentioned location.      The patient will continue to monitor the areas daily, inspect the feet, wear protective shoe gear when ambulatory, and moisturizer to maintain skin integrity.

## 2022-03-09 RX ORDER — OXYCODONE AND ACETAMINOPHEN 10; 325 MG/1; MG/1
1 TABLET ORAL
Qty: 150 TABLET | Refills: 0 | Status: SHIPPED | OUTPATIENT
Start: 2022-03-09 | End: 2022-03-17 | Stop reason: SDUPTHER

## 2022-03-09 NOTE — TELEPHONE ENCOUNTER
----- Message from Ngoc Coy sent at 3/9/2022  1:43 PM CST -----  Regarding: refill request  Contact: patient [phone]  Requesting an RX refill or new RX.  Is this a refill or new RX: refill  RX name and strengthoxyCODONE-acetaminophen (PERCOCET)  mg per tablet  Is this a 30 day or 90 day RX: 30  Pharmacy name and phone # (  [facility]  [address]  [place] [address]  Phone: [phone] Fax: [phone]    The doctors have asked that we provide their patients with the following 2 reminders -- prescription refills can take up to 72 hours, and a friendly reminder that in the future you can use your [facility] account to request refills: yes

## 2022-03-09 NOTE — TELEPHONE ENCOUNTER
Care Due:                  Date            Visit Type   Department     Provider  --------------------------------------------------------------------------------                                EP -                              PRIMARY      [facility] INTERNAL  Last Visit: 02-      CARE ([facility])   MEDICINE       FEDERICO MARCIAL                              [facility]                              PRIMARY      [facility] INTERNAL  Next Visit: 03-      CARE ([facility])   MEDICINE       FEDERICO MARCIAL                                                            Last  Test          Frequency    Reason                     Performed    Due Date  --------------------------------------------------------------------------------    HBA1C.......  6 months...  insulin..................  11- 05-    Lipid Panel.  12 months..  atorvastatin.............  05- 05-    Powered by [employer]. Reference number: [id number].   3/09/2022 1:51:44 PM CST

## 2022-03-17 ENCOUNTER — OFFICE VISIT (OUTPATIENT)
Dept: INTERNAL MEDICINE | Facility: CLINIC | Age: 82
End: 2022-03-17
Payer: MEDICARE

## 2022-03-17 ENCOUNTER — LAB VISIT (OUTPATIENT)
Dept: LAB | Facility: HOSPITAL | Age: 82
End: 2022-03-17
Attending: INTERNAL MEDICINE
Payer: MEDICARE

## 2022-03-17 VITALS
DIASTOLIC BLOOD PRESSURE: 70 MMHG | SYSTOLIC BLOOD PRESSURE: 136 MMHG | WEIGHT: 222 LBS | HEIGHT: 68 IN | BODY MASS INDEX: 33.65 KG/M2 | HEART RATE: 79 BPM | OXYGEN SATURATION: 98 %

## 2022-03-17 DIAGNOSIS — F39 MOOD DISORDER: ICD-10-CM

## 2022-03-17 DIAGNOSIS — I10 ESSENTIAL HYPERTENSION: Primary | ICD-10-CM

## 2022-03-17 DIAGNOSIS — K21.9 GASTROESOPHAGEAL REFLUX DISEASE WITHOUT ESOPHAGITIS: ICD-10-CM

## 2022-03-17 DIAGNOSIS — E78.2 MIXED HYPERLIPIDEMIA: ICD-10-CM

## 2022-03-17 DIAGNOSIS — J20.9 ACUTE BRONCHITIS, UNSPECIFIED ORGANISM: ICD-10-CM

## 2022-03-17 DIAGNOSIS — Z79.4 TYPE 2 DIABETES MELLITUS WITH DIABETIC POLYNEUROPATHY, WITH LONG-TERM CURRENT USE OF INSULIN: ICD-10-CM

## 2022-03-17 DIAGNOSIS — M10.9 GOUT, ARTHRITIS: ICD-10-CM

## 2022-03-17 DIAGNOSIS — E11.42 TYPE 2 DIABETES MELLITUS WITH DIABETIC POLYNEUROPATHY, WITH LONG-TERM CURRENT USE OF INSULIN: ICD-10-CM

## 2022-03-17 DIAGNOSIS — M48.062 SPINAL STENOSIS OF LUMBAR REGION WITH NEUROGENIC CLAUDICATION: ICD-10-CM

## 2022-03-17 LAB
ALBUMIN SERPL BCP-MCNC: 3.2 G/DL (ref 3.5–5.2)
ALP SERPL-CCNC: 84 U/L (ref 55–135)
ALT SERPL W/O P-5'-P-CCNC: 12 U/L (ref 10–44)
ANION GAP SERPL CALC-SCNC: 12 MMOL/L (ref 8–16)
AST SERPL-CCNC: 18 U/L (ref 10–40)
BASOPHILS # BLD AUTO: 0.08 K/UL (ref 0–0.2)
BASOPHILS NFR BLD: 0.8 % (ref 0–1.9)
BILIRUB SERPL-MCNC: 0.5 MG/DL (ref 0.1–1)
BUN SERPL-MCNC: 9 MG/DL (ref 8–23)
CALCIUM SERPL-MCNC: 9.9 MG/DL (ref 8.7–10.5)
CHLORIDE SERPL-SCNC: 99 MMOL/L (ref 95–110)
CO2 SERPL-SCNC: 31 MMOL/L (ref 23–29)
CREAT SERPL-MCNC: 0.9 MG/DL (ref 0.5–1.4)
DIFFERENTIAL METHOD: ABNORMAL
EOSINOPHIL # BLD AUTO: 0.3 K/UL (ref 0–0.5)
EOSINOPHIL NFR BLD: 2.8 % (ref 0–8)
ERYTHROCYTE [DISTWIDTH] IN BLOOD BY AUTOMATED COUNT: 13.9 % (ref 11.5–14.5)
EST. GFR  (AFRICAN AMERICAN): >60 ML/MIN/1.73 M^2
EST. GFR  (NON AFRICAN AMERICAN): 59.7 ML/MIN/1.73 M^2
ESTIMATED AVG GLUCOSE: 220 MG/DL (ref 68–131)
GLUCOSE SERPL-MCNC: 246 MG/DL (ref 70–110)
HBA1C MFR BLD: 9.3 % (ref 4–5.6)
HCT VFR BLD AUTO: 36.9 % (ref 37–48.5)
HGB BLD-MCNC: 11.4 G/DL (ref 12–16)
IMM GRANULOCYTES # BLD AUTO: 0.05 K/UL (ref 0–0.04)
IMM GRANULOCYTES NFR BLD AUTO: 0.5 % (ref 0–0.5)
LYMPHOCYTES # BLD AUTO: 2 K/UL (ref 1–4.8)
LYMPHOCYTES NFR BLD: 20.4 % (ref 18–48)
MCH RBC QN AUTO: 28 PG (ref 27–31)
MCHC RBC AUTO-ENTMCNC: 30.9 G/DL (ref 32–36)
MCV RBC AUTO: 91 FL (ref 82–98)
MONOCYTES # BLD AUTO: 1 K/UL (ref 0.3–1)
MONOCYTES NFR BLD: 9.6 % (ref 4–15)
NEUTROPHILS # BLD AUTO: 6.6 K/UL (ref 1.8–7.7)
NEUTROPHILS NFR BLD: 65.9 % (ref 38–73)
NRBC BLD-RTO: 0 /100 WBC
PLATELET # BLD AUTO: 271 K/UL (ref 150–450)
PMV BLD AUTO: 11 FL (ref 9.2–12.9)
POTASSIUM SERPL-SCNC: 3.1 MMOL/L (ref 3.5–5.1)
PROT SERPL-MCNC: 7.3 G/DL (ref 6–8.4)
RBC # BLD AUTO: 4.07 M/UL (ref 4–5.4)
SODIUM SERPL-SCNC: 142 MMOL/L (ref 136–145)
TSH SERPL DL<=0.005 MIU/L-ACNC: 0.99 UIU/ML (ref 0.4–4)
WBC # BLD AUTO: 9.99 K/UL (ref 3.9–12.7)

## 2022-03-17 PROCEDURE — 80053 COMPREHEN METABOLIC PANEL: CPT | Performed by: INTERNAL MEDICINE

## 2022-03-17 PROCEDURE — 1126F AMNT PAIN NOTED NONE PRSNT: CPT | Mod: CPTII,S$GLB,, | Performed by: INTERNAL MEDICINE

## 2022-03-17 PROCEDURE — 84443 ASSAY THYROID STIM HORMONE: CPT | Performed by: INTERNAL MEDICINE

## 2022-03-17 PROCEDURE — 99999 PR PBB SHADOW E&M-EST. PATIENT-LVL IV: CPT | Mod: PBBFAC,,, | Performed by: INTERNAL MEDICINE

## 2022-03-17 PROCEDURE — 1126F PR PAIN SEVERITY QUANTIFIED, NO PAIN PRESENT: ICD-10-PCS | Mod: CPTII,S$GLB,, | Performed by: INTERNAL MEDICINE

## 2022-03-17 PROCEDURE — 99214 OFFICE O/P EST MOD 30 MIN: CPT | Mod: S$GLB,,, | Performed by: INTERNAL MEDICINE

## 2022-03-17 PROCEDURE — 1159F PR MEDICATION LIST DOCUMENTED IN MEDICAL RECORD: ICD-10-PCS | Mod: CPTII,S$GLB,, | Performed by: INTERNAL MEDICINE

## 2022-03-17 PROCEDURE — 1159F MED LIST DOCD IN RCRD: CPT | Mod: CPTII,S$GLB,, | Performed by: INTERNAL MEDICINE

## 2022-03-17 PROCEDURE — 3075F PR MOST RECENT SYSTOLIC BLOOD PRESS GE 130-139MM HG: ICD-10-PCS | Mod: CPTII,S$GLB,, | Performed by: INTERNAL MEDICINE

## 2022-03-17 PROCEDURE — 36415 COLL VENOUS BLD VENIPUNCTURE: CPT | Performed by: INTERNAL MEDICINE

## 2022-03-17 PROCEDURE — 1101F PR PT FALLS ASSESS DOC 0-1 FALLS W/OUT INJ PAST YR: ICD-10-PCS | Mod: CPTII,S$GLB,, | Performed by: INTERNAL MEDICINE

## 2022-03-17 PROCEDURE — 83036 HEMOGLOBIN GLYCOSYLATED A1C: CPT | Performed by: INTERNAL MEDICINE

## 2022-03-17 PROCEDURE — 3288F FALL RISK ASSESSMENT DOCD: CPT | Mod: CPTII,S$GLB,, | Performed by: INTERNAL MEDICINE

## 2022-03-17 PROCEDURE — 99999 PR PBB SHADOW E&M-EST. PATIENT-LVL IV: ICD-10-PCS | Mod: PBBFAC,,, | Performed by: INTERNAL MEDICINE

## 2022-03-17 PROCEDURE — 3075F SYST BP GE 130 - 139MM HG: CPT | Mod: CPTII,S$GLB,, | Performed by: INTERNAL MEDICINE

## 2022-03-17 PROCEDURE — 3078F DIAST BP <80 MM HG: CPT | Mod: CPTII,S$GLB,, | Performed by: INTERNAL MEDICINE

## 2022-03-17 PROCEDURE — 3078F PR MOST RECENT DIASTOLIC BLOOD PRESSURE < 80 MM HG: ICD-10-PCS | Mod: CPTII,S$GLB,, | Performed by: INTERNAL MEDICINE

## 2022-03-17 PROCEDURE — 99214 PR OFFICE/OUTPT VISIT, EST, LEVL IV, 30-39 MIN: ICD-10-PCS | Mod: S$GLB,,, | Performed by: INTERNAL MEDICINE

## 2022-03-17 PROCEDURE — 1101F PT FALLS ASSESS-DOCD LE1/YR: CPT | Mod: CPTII,S$GLB,, | Performed by: INTERNAL MEDICINE

## 2022-03-17 PROCEDURE — 85025 COMPLETE CBC W/AUTO DIFF WBC: CPT | Performed by: INTERNAL MEDICINE

## 2022-03-17 PROCEDURE — 3288F PR FALLS RISK ASSESSMENT DOCUMENTED: ICD-10-PCS | Mod: CPTII,S$GLB,, | Performed by: INTERNAL MEDICINE

## 2022-03-17 RX ORDER — OXYCODONE AND ACETAMINOPHEN 10; 325 MG/1; MG/1
1 TABLET ORAL
Qty: 150 TABLET | Refills: 0 | Status: SHIPPED | OUTPATIENT
Start: 2022-04-08 | End: 2022-04-28 | Stop reason: SDUPTHER

## 2022-03-17 RX ORDER — DOXYCYCLINE HYCLATE 100 MG
100 TABLET ORAL 2 TIMES DAILY
Qty: 20 TABLET | Refills: 0 | Status: SHIPPED | OUTPATIENT
Start: 2022-03-17 | End: 2022-03-27

## 2022-03-17 NOTE — PROGRESS NOTES
"  CHIEF COMPLAINT:  Follow up of multple problems.      HISTORY OF PRESENT ILLNESS: This is a 81-year-old woman who presents for follow up of above.    She has had a cough with green thick mucous for the last week.  She is taking promethazine DM which is helping. No fever or chills. She has had some intermittent shortness of breath and wheezing.     She still has some bilateral shoulder pain - it has been a soarness that comes and goes every couple of days. . Has been going on the last several months.  She has pain with raising her arms over her head. She complains of back pain and body pain  which has been severe. She is taking oxycodone apap 10/325 up to 5 times daily.      She still has had leg swelling.      She saw Dr Reilly on 22 - she continues to take Eliquis 5 mg wice daily.  She had cardiac PET stress 2022 which was negative for ischemia    Her daughter from [place] came and stayed with her for a week which was n ice.     "I have so much medication to take, I do not even know the name of the medications.  I get things confused." She does not have medication bottles with her today.        SHe continues to take duloxetine 60 mg twice daily. Her mood is up and down depending upon her pain. Her sister who lives in [place]  early  - age 71.   She had a heart attack and  before the ambulance could get to her.  Mrs Villasenor would speak with this sister every day.       No fever or chills. She has felt cold.  No nausea, vomiting, constipation, dysuria, hematuira.  She denies swallowing problems today.  She occasionally has pain when she urinates. Vaseline to the vaginal area helps.  Occasional diarrhea      She is living alone. . She does not see her grandson very  much. She has a niece, Kacie Mast who lives locally.      She is taking  amlodipine 10 mg daily, coreg 25 mg twice daily and lasix 40 mg 1 tablet twice daily for her hypertension and diastolic dysfunction.      She has gout. She is " taking allopurinol 100 mg daily      She saw Endocrinology on 10/22/20. She should take Lantus 60 units daily and Novolog 24 units once or twice daily with meals.  Blood sugar has been up and down. 120-245.  Weight has been stable.       PAST MEDICAL HISTORY:   1. Diabetes mellitus   2. Hypertension   3. Hyperlipidemia   4. Left heart catheterization January 2007 which revealed luminal irregularities in the LAD, left circumflex and right coronary artery. She had diastolic dysfunction and patent renal arteries.   5. Sleep apnea   6. Obesity.   7. Nephrolithiasis status post lithotripsy.   8. Reflux - had nonerosive gastropathy on EGD September 2007. Gastritis on EGD 9/2010  9. Hidradenitis suppurativa.   10. History of diverticulitis with a hospitalized October 2007.   11. Migraines   12. Obesity.   13. 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    14. Total hysterectomy in 1969.   15. Cholecystectomy was done at that time as well.   16. 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   17. Colon polyps on colonscopy 11/2010 - due 2013   18. Hospitalization 12/10 for e coli sepsis due to left ureteral stone with left nephrostomy tube in [place], [place]   19. Left knee replacement 9/2012      MEDICATIONS and ALLERGIES: Updated on EPIC.     PHYSICAL EXAMINATION:       GENERAL: She is alert, oriented, no apparent distress. Affect within   normal limits.  Conjunctiva anicteric.    NECK: Supple.   Respiratory: Effort normal. Lungs clear  HEART: Regular rate and rhythm without murmurs, gallops or rubs.   No lower extremity edema.             ASSESSMENT AND PLAN:   1/ Bronchitis -  doxycycline 100 mg twice daily for 10 days  1. Hypertension with diastolic heart failure- stable. Negative PET strss.   2.  OA neck and lumbar spine - on oxycodone  3.  Gout - on allopurinol.   4.  Mood disorder -  on duloxetine  5.  Swallowing disorder - s/p EGD with dilation on  11/11/21.   6. Asthma - stable   7. Gerd -should be taking on pantoprazole 40 mg twice daily - refilled  8. Diabetes with neuropathy- watch sugars- discussed diet. labs  9. Hyperlipidemia - on  lipitor  10. Allergic rhinitis - stable  12. CRI - labs  13. Obesity - discussed diet, exercise and weight loss  14. CAD -risk factor modification  15. Aortic atherosclerosis and possible mesenteric artery stenosis- risk factor modification  16. Tortuous aorta - HTN controlled  17. Colon polyps - Colonoscopy in 8/2013 - 2 polyps. Flex sig 11/2013 - mild granular mucosa. Colonoscopy 6/2017 diverticulosis - no polyps due 2023.   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 .Restless leg syndrome - on roprinole    20. Morbid obestiy - working on diet.   .  MMG 6/21     I will see her back in 1 month sooner if problems arise     Niece is Kacie Arayan - encouraged her to have her niece come help her with medication management. I suspect that she is not taking her medications correctly which is contributing to her problems. ALso suspect depression as a main reason for her symptoms.   Stressed importance of taking duloxetine regularly.

## 2022-03-22 RX ORDER — FUROSEMIDE 40 MG/1
TABLET ORAL
Qty: 180 TABLET | Refills: 1 | Status: SHIPPED | OUTPATIENT
Start: 2022-03-22 | End: 2023-03-08 | Stop reason: SDUPTHER

## 2022-03-22 NOTE — TELEPHONE ENCOUNTER
No new care gaps identified.  Powered by [employer]. Reference number: [id number].   3/22/2022 9:06:48 AM CDT

## 2022-03-26 ENCOUNTER — TELEPHONE (OUTPATIENT)
Dept: INTERNAL MEDICINE | Facility: CLINIC | Age: 82
End: 2022-03-26
Payer: MEDICARE

## 2022-03-26 RX ORDER — POTASSIUM CHLORIDE 750 MG/1
10 TABLET, EXTENDED RELEASE ORAL 2 TIMES DAILY
Qty: 60 TABLET | Refills: 3 | Status: SHIPPED | OUTPATIENT
Start: 2022-03-26 | End: 2022-09-09 | Stop reason: SDUPTHER

## 2022-03-26 NOTE — TELEPHONE ENCOUNTER
Please notify pt  Your blood work reveals that your sugars are doing a little better  Your potassium level is low.  Start on potassium supplement twice dally - Prescription sent to [facility]

## 2022-03-28 ENCOUNTER — TELEPHONE (OUTPATIENT)
Dept: INTERNAL MEDICINE | Facility: CLINIC | Age: 82
End: 2022-03-28
Payer: MEDICARE

## 2022-04-01 ENCOUNTER — TELEPHONE (OUTPATIENT)
Dept: ORTHOPEDICS | Facility: CLINIC | Age: 82
End: 2022-04-01
Payer: MEDICARE

## 2022-04-01 DIAGNOSIS — Z96.652 H/O TOTAL KNEE REPLACEMENT, LEFT: Primary | ICD-10-CM

## 2022-04-01 NOTE — TELEPHONE ENCOUNTER
Lm for pt to call back and schedule an x-ray. Orders are in.    ----- Message from La Williamson sent at 3/31/2022  5:03 PM CDT -----  Type: Requesting to speak with nurse         Who Called: PT  Regarding: Pt states she got a letter in the mail about getting an xray from the surgery she had please advise  Would the patient rather a call back or a response via [facility]? Call back  Best Call Back Number: [phone]  Additional Information: n/a

## 2022-04-04 DIAGNOSIS — Z79.4 TYPE 2 DIABETES MELLITUS WITH STAGE 3 CHRONIC KIDNEY DISEASE, WITH LONG-TERM CURRENT USE OF INSULIN: ICD-10-CM

## 2022-04-04 DIAGNOSIS — E11.22 TYPE 2 DIABETES MELLITUS WITH STAGE 3 CHRONIC KIDNEY DISEASE, WITH LONG-TERM CURRENT USE OF INSULIN: ICD-10-CM

## 2022-04-04 DIAGNOSIS — N18.30 TYPE 2 DIABETES MELLITUS WITH STAGE 3 CHRONIC KIDNEY DISEASE, WITH LONG-TERM CURRENT USE OF INSULIN: ICD-10-CM

## 2022-04-04 RX ORDER — ISOPROPYL ALCOHOL 70 ML/100ML
SWAB TOPICAL
Qty: 200 EACH | Refills: 5 | Status: SHIPPED | OUTPATIENT
Start: 2022-04-04 | End: 2023-01-20

## 2022-04-04 NOTE — TELEPHONE ENCOUNTER
No new care gaps identified.  Powered by [employer]. Reference number: [id number].   4/04/2022 2:50:13 PM CDT

## 2022-04-05 ENCOUNTER — TELEPHONE (OUTPATIENT)
Dept: ORTHOPEDICS | Facility: CLINIC | Age: 82
End: 2022-04-05
Payer: MEDICARE

## 2022-04-05 NOTE — TELEPHONE ENCOUNTER
Spoke with pt, scheduled her x-rays. She was pleased and had no further questions.    ----- Message from Celeste Marks sent at 4/5/2022 11:52 AM CDT -----  Regarding: [employer] Recall Patient  Contact: Pt  Pls contact pt re: scheduling x-ray.    Confirmed contact info below:  Contact Name: Henrietta Villasenor  Phone Number: [phone]

## 2022-04-06 NOTE — TELEPHONE ENCOUNTER
No new care gaps identified.  Powered by [employer] by [employer]. Reference number: [id number].   4/06/2022 4:39:37 PM CDT

## 2022-04-07 ENCOUNTER — OFFICE VISIT (OUTPATIENT)
Dept: CARDIOLOGY | Facility: CLINIC | Age: 82
End: 2022-04-07
Payer: MEDICARE

## 2022-04-07 ENCOUNTER — HOSPITAL ENCOUNTER (OUTPATIENT)
Dept: RADIOLOGY | Facility: HOSPITAL | Age: 82
Discharge: HOME | End: 2022-04-07
Attending: NURSE PRACTITIONER
Payer: MEDICARE

## 2022-04-07 VITALS
BODY MASS INDEX: 33.38 KG/M2 | HEIGHT: 68 IN | WEIGHT: 220.25 LBS | OXYGEN SATURATION: 98 % | SYSTOLIC BLOOD PRESSURE: 160 MMHG | DIASTOLIC BLOOD PRESSURE: 86 MMHG | HEART RATE: 78 BPM

## 2022-04-07 DIAGNOSIS — I25.119 CORONARY ARTERY DISEASE INVOLVING NATIVE CORONARY ARTERY OF NATIVE HEART WITH ANGINA PECTORIS: ICD-10-CM

## 2022-04-07 DIAGNOSIS — E78.00 HYPERCHOLESTEROLEMIA: ICD-10-CM

## 2022-04-07 DIAGNOSIS — I77.1 TORTUOUS AORTA: ICD-10-CM

## 2022-04-07 DIAGNOSIS — I82.433 ACUTE DEEP VEIN THROMBOSIS (DVT) OF POPLITEAL VEIN OF BOTH LOWER EXTREMITIES: ICD-10-CM

## 2022-04-07 DIAGNOSIS — G89.29 CHRONIC BILATERAL LOW BACK PAIN WITH SCIATICA, SCIATICA LATERALITY UNSPECIFIED: ICD-10-CM

## 2022-04-07 DIAGNOSIS — Z74.09 IMPAIRED FUNCTIONAL MOBILITY, BALANCE, GAIT, AND ENDURANCE: ICD-10-CM

## 2022-04-07 DIAGNOSIS — I87.2 VENOUS INSUFFICIENCY OF BOTH LOWER EXTREMITIES: ICD-10-CM

## 2022-04-07 DIAGNOSIS — G47.33 OSA ON CPAP: Chronic | ICD-10-CM

## 2022-04-07 DIAGNOSIS — M47.816 LUMBAR SPONDYLOSIS: ICD-10-CM

## 2022-04-07 DIAGNOSIS — I73.9 PAD (PERIPHERAL ARTERY DISEASE): ICD-10-CM

## 2022-04-07 DIAGNOSIS — M54.40 CHRONIC BILATERAL LOW BACK PAIN WITH SCIATICA, SCIATICA LATERALITY UNSPECIFIED: ICD-10-CM

## 2022-04-07 DIAGNOSIS — M79.2 NEUROPATHIC PAIN: ICD-10-CM

## 2022-04-07 DIAGNOSIS — Z96.652 H/O TOTAL KNEE REPLACEMENT, LEFT: ICD-10-CM

## 2022-04-07 DIAGNOSIS — I25.10 ATHEROSCLEROSIS OF NATIVE CORONARY ARTERY OF NATIVE HEART WITHOUT ANGINA PECTORIS: ICD-10-CM

## 2022-04-07 DIAGNOSIS — G89.4 CHRONIC PAIN SYNDROME: ICD-10-CM

## 2022-04-07 DIAGNOSIS — I77.819 ECTATIC AORTA: ICD-10-CM

## 2022-04-07 DIAGNOSIS — I10 ESSENTIAL HYPERTENSION: Chronic | ICD-10-CM

## 2022-04-07 DIAGNOSIS — I70.0 AORTIC ATHEROSCLEROSIS: ICD-10-CM

## 2022-04-07 DIAGNOSIS — I15.2 HYPERTENSION ASSOCIATED WITH DIABETES: ICD-10-CM

## 2022-04-07 DIAGNOSIS — E11.59 HYPERTENSION ASSOCIATED WITH DIABETES: ICD-10-CM

## 2022-04-07 DIAGNOSIS — M48.062 SPINAL STENOSIS OF LUMBAR REGION WITH NEUROGENIC CLAUDICATION: ICD-10-CM

## 2022-04-07 DIAGNOSIS — G89.4 CHRONIC PAIN DISORDER: ICD-10-CM

## 2022-04-07 DIAGNOSIS — M79.605 LEFT LEG PAIN: Primary | ICD-10-CM

## 2022-04-07 PROBLEM — M54.42 CHRONIC BILATERAL LOW BACK PAIN WITH SCIATICA: Status: ACTIVE | Noted: 2022-04-07

## 2022-04-07 PROBLEM — M54.41 CHRONIC BILATERAL LOW BACK PAIN WITH SCIATICA: Status: ACTIVE | Noted: 2022-04-07

## 2022-04-07 PROCEDURE — 73560 XR KNEE ORTHO LEFT: ICD-10-PCS | Mod: 26,RT,, | Performed by: RADIOLOGY

## 2022-04-07 PROCEDURE — 73562 XR KNEE ORTHO LEFT: ICD-10-PCS | Mod: 26,LT,, | Performed by: RADIOLOGY

## 2022-04-07 PROCEDURE — 99215 PR OFFICE/OUTPT VISIT, EST, LEVL V, 40-54 MIN: ICD-10-PCS | Mod: S$GLB,,, | Performed by: INTERNAL MEDICINE

## 2022-04-07 PROCEDURE — 1159F PR MEDICATION LIST DOCUMENTED IN MEDICAL RECORD: ICD-10-PCS | Mod: CPTII,S$GLB,, | Performed by: INTERNAL MEDICINE

## 2022-04-07 PROCEDURE — 3079F DIAST BP 80-89 MM HG: CPT | Mod: CPTII,S$GLB,, | Performed by: INTERNAL MEDICINE

## 2022-04-07 PROCEDURE — 3077F PR MOST RECENT SYSTOLIC BLOOD PRESSURE >= 140 MM HG: ICD-10-PCS | Mod: CPTII,S$GLB,, | Performed by: INTERNAL MEDICINE

## 2022-04-07 PROCEDURE — 3079F PR MOST RECENT DIASTOLIC BLOOD PRESSURE 80-89 MM HG: ICD-10-PCS | Mod: CPTII,S$GLB,, | Performed by: INTERNAL MEDICINE

## 2022-04-07 PROCEDURE — 99499 RISK ADDL DX/OHS AUDIT: ICD-10-PCS | Mod: S$GLB,,, | Performed by: INTERNAL MEDICINE

## 2022-04-07 PROCEDURE — 1159F MED LIST DOCD IN RCRD: CPT | Mod: CPTII,S$GLB,, | Performed by: INTERNAL MEDICINE

## 2022-04-07 PROCEDURE — 73560 X-RAY EXAM OF KNEE 1 OR 2: CPT | Mod: TC,RT

## 2022-04-07 PROCEDURE — 1101F PT FALLS ASSESS-DOCD LE1/YR: CPT | Mod: CPTII,S$GLB,, | Performed by: INTERNAL MEDICINE

## 2022-04-07 PROCEDURE — 3077F SYST BP >= 140 MM HG: CPT | Mod: CPTII,S$GLB,, | Performed by: INTERNAL MEDICINE

## 2022-04-07 PROCEDURE — 3288F FALL RISK ASSESSMENT DOCD: CPT | Mod: CPTII,S$GLB,, | Performed by: INTERNAL MEDICINE

## 2022-04-07 PROCEDURE — 99999 PR PBB SHADOW E&M-EST. PATIENT-LVL III: ICD-10-PCS | Mod: PBBFAC,,, | Performed by: INTERNAL MEDICINE

## 2022-04-07 PROCEDURE — 1125F AMNT PAIN NOTED PAIN PRSNT: CPT | Mod: CPTII,S$GLB,, | Performed by: INTERNAL MEDICINE

## 2022-04-07 PROCEDURE — 73562 X-RAY EXAM OF KNEE 3: CPT | Mod: 26,LT,, | Performed by: RADIOLOGY

## 2022-04-07 PROCEDURE — 3288F PR FALLS RISK ASSESSMENT DOCUMENTED: ICD-10-PCS | Mod: CPTII,S$GLB,, | Performed by: INTERNAL MEDICINE

## 2022-04-07 PROCEDURE — 1101F PR PT FALLS ASSESS DOC 0-1 FALLS W/OUT INJ PAST YR: ICD-10-PCS | Mod: CPTII,S$GLB,, | Performed by: INTERNAL MEDICINE

## 2022-04-07 PROCEDURE — 99999 PR PBB SHADOW E&M-EST. PATIENT-LVL III: CPT | Mod: PBBFAC,,, | Performed by: INTERNAL MEDICINE

## 2022-04-07 PROCEDURE — 1125F PR PAIN SEVERITY QUANTIFIED, PAIN PRESENT: ICD-10-PCS | Mod: CPTII,S$GLB,, | Performed by: INTERNAL MEDICINE

## 2022-04-07 PROCEDURE — 73560 X-RAY EXAM OF KNEE 1 OR 2: CPT | Mod: 26,RT,, | Performed by: RADIOLOGY

## 2022-04-07 PROCEDURE — 99215 OFFICE O/P EST HI 40 MIN: CPT | Mod: S$GLB,,, | Performed by: INTERNAL MEDICINE

## 2022-04-07 PROCEDURE — 99499 UNLISTED E&M SERVICE: CPT | Mod: S$GLB,,, | Performed by: INTERNAL MEDICINE

## 2022-04-07 RX ORDER — LANCETS
EACH MISCELLANEOUS
Qty: 400 EACH | Refills: 3 | Status: SHIPPED | OUTPATIENT
Start: 2022-04-07 | End: 2023-02-22

## 2022-04-07 NOTE — PATIENT INSTRUCTIONS
Assessment and Plan:  Henrietta Villasenor is a 82-year-old female with non obstructive CAD, PAD, venous insufficiency, IDDM2, hypertension, morbid obesity, peripheral neuropathy, osteoarthritis s/p LTKA, who presents for a follow up appointment.    1. Acute BLE popliteal DVT- unprovoked.  BLE Venous Reflux Study on 3/30/2021 no evidence of lower extremity DVT bilaterally.  Previously noted bilateral popliteal vein DVT (12/15/2020) no longer present.  Continue anticoagulation with apixaban 5 mg twice a day.    2. BLE edema -Due to venous insufficiency.  BLE Venous Reflux Study on 3/30/2021 revealed minimal BLE venous reflux.  Prescribe new compression stockings.  Pt to elevate legs when resting.  LImit sodium intake up tp 2 g a day and fluid intake to 1.5 L a day.     3. Chest pain- Mrs. Villasenor states chest pain has fully resolved.  PET Stress Test on 2/9/2022 revealed no clinically significant regional resting or stress induced perfusion abnormalities.  Continue current medications.     4. PAD- LLE arterial ultrasound on 11/23/2020 revealed BALA of 0.88 on the right indicating mild peripheral artery disease.  Left lower extremity BALA is within normal limits at 0.99.  No hemodynamically significant stenosis demonstrated in the left lower extremity arterial system.  Continue asa 81 mg and start Atorvastatin 40 mg for medical management of peripheral artery disease. Encourage daily exercise 30 mins per day, 5 days per week.    5. HTN- uncontrolled. Increase amlodipine to 10mg.    6. Obesity- Encourage dietary modification and moderate/aerobic exercise for a minimum of 30 minutes for 5 days a week.     7. Back Pain/Leg Pain- Likely due to severe degenerative disc disease.  MRI Lumbar Spine 11/12/2021 revealed multilevel degenerative disc disease, most severe at L4-L5 with moderate spinal canal and bilateral neural foraminal narrowing.  Refer to Neurosurgery for evaluation.  Check CTA abd/pelvis to evaluated for flow limiting  PAD.    Follow up in 3 months

## 2022-04-07 NOTE — TELEPHONE ENCOUNTER
Refill Authorization Note   Henrietta Villasenor  is requesting a refill authorization.  Brief Assessment and Rationale for Refill:  Approve     Medication Therapy Plan:  Sig matches    Medication Reconciliation Completed: No   Comments:     No Care Gaps recommended.     Note composed:5:40 PM 04/07/2022

## 2022-04-08 ENCOUNTER — TELEPHONE (OUTPATIENT)
Dept: ORTHOPEDICS | Facility: CLINIC | Age: 82
End: 2022-04-08
Payer: MEDICARE

## 2022-04-08 NOTE — TELEPHONE ENCOUNTER
Called patient with xray results. Left a message letting her know that the knee prosthesis looks good. She can call back to schedule an appt if she's having any pain or problems.

## 2022-04-14 RX ORDER — AMLODIPINE BESYLATE 5 MG/1
TABLET ORAL
Qty: 90 TABLET | Refills: 0 | Status: SHIPPED | OUTPATIENT
Start: 2022-04-14 | End: 2022-04-28

## 2022-04-14 RX ORDER — APIXABAN 5 MG/1
TABLET, FILM COATED ORAL
Qty: 180 TABLET | Refills: 0 | Status: SHIPPED | OUTPATIENT
Start: 2022-04-14 | End: 2022-06-09 | Stop reason: SDUPTHER

## 2022-04-14 NOTE — TELEPHONE ENCOUNTER
No new care gaps identified.  Powered by [employer]. Reference number: [id number].   4/14/2022 3:03:50 AM CDT

## 2022-04-28 ENCOUNTER — OFFICE VISIT (OUTPATIENT)
Dept: INTERNAL MEDICINE | Facility: CLINIC | Age: 82
End: 2022-04-28
Payer: MEDICARE

## 2022-04-28 VITALS
HEIGHT: 68 IN | OXYGEN SATURATION: 99 % | DIASTOLIC BLOOD PRESSURE: 60 MMHG | BODY MASS INDEX: 33.86 KG/M2 | SYSTOLIC BLOOD PRESSURE: 100 MMHG | HEART RATE: 83 BPM | WEIGHT: 223.44 LBS

## 2022-04-28 DIAGNOSIS — E66.01 MORBID (SEVERE) OBESITY DUE TO EXCESS CALORIES: Primary | ICD-10-CM

## 2022-04-28 DIAGNOSIS — M10.9 GOUT, ARTHRITIS: ICD-10-CM

## 2022-04-28 DIAGNOSIS — Z87.898 HISTORY OF WHEEZING: ICD-10-CM

## 2022-04-28 DIAGNOSIS — K21.9 GASTROESOPHAGEAL REFLUX DISEASE WITHOUT ESOPHAGITIS: ICD-10-CM

## 2022-04-28 DIAGNOSIS — I25.119 CORONARY ARTERY DISEASE INVOLVING NATIVE CORONARY ARTERY OF NATIVE HEART WITH ANGINA PECTORIS: ICD-10-CM

## 2022-04-28 DIAGNOSIS — M46.06 SPINAL ENTHESOPATHY OF LUMBAR REGION: ICD-10-CM

## 2022-04-28 DIAGNOSIS — I70.0 AORTIC ATHEROSCLEROSIS: ICD-10-CM

## 2022-04-28 DIAGNOSIS — M54.9 OTHER CHRONIC BACK PAIN: Chronic | ICD-10-CM

## 2022-04-28 DIAGNOSIS — I10 ESSENTIAL HYPERTENSION: Chronic | ICD-10-CM

## 2022-04-28 DIAGNOSIS — G89.29 OTHER CHRONIC BACK PAIN: Chronic | ICD-10-CM

## 2022-04-28 PROBLEM — E21.3 HYPERPARATHYROIDISM: Status: RESOLVED | Noted: 2017-08-20 | Resolved: 2022-04-28

## 2022-04-28 PROCEDURE — 3288F FALL RISK ASSESSMENT DOCD: CPT | Mod: CPTII,S$GLB,, | Performed by: INTERNAL MEDICINE

## 2022-04-28 PROCEDURE — 1159F PR MEDICATION LIST DOCUMENTED IN MEDICAL RECORD: ICD-10-PCS | Mod: CPTII,S$GLB,, | Performed by: INTERNAL MEDICINE

## 2022-04-28 PROCEDURE — 99999 PR PBB SHADOW E&M-EST. PATIENT-LVL IV: ICD-10-PCS | Mod: PBBFAC,,, | Performed by: INTERNAL MEDICINE

## 2022-04-28 PROCEDURE — 1159F MED LIST DOCD IN RCRD: CPT | Mod: CPTII,S$GLB,, | Performed by: INTERNAL MEDICINE

## 2022-04-28 PROCEDURE — 1101F PT FALLS ASSESS-DOCD LE1/YR: CPT | Mod: CPTII,S$GLB,, | Performed by: INTERNAL MEDICINE

## 2022-04-28 PROCEDURE — 3074F PR MOST RECENT SYSTOLIC BLOOD PRESSURE < 130 MM HG: ICD-10-PCS | Mod: CPTII,S$GLB,, | Performed by: INTERNAL MEDICINE

## 2022-04-28 PROCEDURE — 99214 OFFICE O/P EST MOD 30 MIN: CPT | Mod: S$GLB,,, | Performed by: INTERNAL MEDICINE

## 2022-04-28 PROCEDURE — 99999 PR PBB SHADOW E&M-EST. PATIENT-LVL IV: CPT | Mod: PBBFAC,,, | Performed by: INTERNAL MEDICINE

## 2022-04-28 PROCEDURE — 1101F PR PT FALLS ASSESS DOC 0-1 FALLS W/OUT INJ PAST YR: ICD-10-PCS | Mod: CPTII,S$GLB,, | Performed by: INTERNAL MEDICINE

## 2022-04-28 PROCEDURE — 3078F PR MOST RECENT DIASTOLIC BLOOD PRESSURE < 80 MM HG: ICD-10-PCS | Mod: CPTII,S$GLB,, | Performed by: INTERNAL MEDICINE

## 2022-04-28 PROCEDURE — 1125F AMNT PAIN NOTED PAIN PRSNT: CPT | Mod: CPTII,S$GLB,, | Performed by: INTERNAL MEDICINE

## 2022-04-28 PROCEDURE — 3288F PR FALLS RISK ASSESSMENT DOCUMENTED: ICD-10-PCS | Mod: CPTII,S$GLB,, | Performed by: INTERNAL MEDICINE

## 2022-04-28 PROCEDURE — 1125F PR PAIN SEVERITY QUANTIFIED, PAIN PRESENT: ICD-10-PCS | Mod: CPTII,S$GLB,, | Performed by: INTERNAL MEDICINE

## 2022-04-28 PROCEDURE — 3078F DIAST BP <80 MM HG: CPT | Mod: CPTII,S$GLB,, | Performed by: INTERNAL MEDICINE

## 2022-04-28 PROCEDURE — 3074F SYST BP LT 130 MM HG: CPT | Mod: CPTII,S$GLB,, | Performed by: INTERNAL MEDICINE

## 2022-04-28 PROCEDURE — 99214 PR OFFICE/OUTPT VISIT, EST, LEVL IV, 30-39 MIN: ICD-10-PCS | Mod: S$GLB,,, | Performed by: INTERNAL MEDICINE

## 2022-04-28 RX ORDER — OXYCODONE AND ACETAMINOPHEN 10; 325 MG/1; MG/1
1 TABLET ORAL
Qty: 150 TABLET | Refills: 0 | Status: SHIPPED | OUTPATIENT
Start: 2022-04-28 | End: 2022-06-09 | Stop reason: SDUPTHER

## 2022-04-28 RX ORDER — ALBUTEROL SULFATE 90 UG/1
2 AEROSOL, METERED RESPIRATORY (INHALATION) EVERY 4 HOURS PRN
Qty: 18 G | Refills: 1 | Status: SHIPPED | OUTPATIENT
Start: 2022-04-28 | End: 2022-12-08

## 2022-04-28 NOTE — PROGRESS NOTES
CHIEF COMPLAINT:  Follow up of multple problems.      HISTORY OF PRESENT ILLNESS: This is a 82-year-old woman who presents for follow up of above.      She continues to have n bora and shoulder pain, back pain. She complains of back pain and body pain  which has been severe. She is taking oxycodone apap 10/325 up to 5 times daily.  SHe continues to take duloxetine 60 mg twice daily.  Her mood is doing ok.  Her pain affects her mood.       She saw Dr Reilly on 1/18/22 - she continues to take Eliquis 5 mg wice daily.  She had cardiac PET stress 2/9/2022 which was negative for ischemia          No fever or chills. She has felt cold.  No nausea, vomiting, constipation, dysuria, hematuira.  She denies swallowing problems today.  She occasionally has pain when she urinates. Vaseline to the vaginal area helps.  Occasional diarrhea      She is living alone. She does not see her grandson very  much. She has a niece, Kacie Mast who lives locally.      She is taking  amlodipine 10 mg daily, coreg 25 mg twice daily and lasix 40 mg 1 tablet once daily for her hypertension and diastolic dysfunction.      She has gout. She is taking allopurinol 100 mg daily      She saw Endocrinology on 10/22/20. She should take Lantus 60 units daily and Novolog 24 units once or twice daily with meals.  Blood sugar has been up and down. 120-245.  Weight has been stable.       PAST MEDICAL HISTORY:   1. Diabetes mellitus   2. Hypertension   3. Hyperlipidemia   4. Left heart catheterization January 2007 which revealed luminal irregularities in the LAD, left circumflex and right coronary artery. She had diastolic dysfunction and patent renal arteries.   5. Sleep apnea   6. Obesity.   7. Nephrolithiasis status post lithotripsy.   8. Reflux - had nonerosive gastropathy on EGD September 2007. Gastritis on EGD 9/2010  9. Hidradenitis suppurativa.   10. History of diverticulitis with a hospitalized October 2007.   11. Migraines   12. Obesity.   13.  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    14. Total hysterectomy in 1969.   15. Cholecystectomy was done at that time as well.   16. 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   17. Colon polyps on colonscopy 11/2010 - due 2013   18. Hospitalization 12/10 for e coli sepsis due to left ureteral stone with left nephrostomy tube in [place], [place]   19. Left knee replacement 9/2012      MEDICATIONS and ALLERGIES: Updated on EPIC.     PHYSICAL EXAMINATION:      /60 (BP Location: Right arm, Patient Position: Sitting)   Pulse 83   Ht 5' 8" (1.727 m)   Wt 101.4 kg (223 lb 7 oz)   LMP  (LMP Unknown)   SpO2 99%   BMI 33.97 kg/m²      GENERAL: She is alert, oriented, no apparent distress. Affect within   normal limits.  Conjunctiva anicteric.    NECK: Supple.   Respiratory: Effort normal. Lungs clear  HEART: Regular rate and rhythm without murmurs, gallops or rubs.   No lower extremity edema.        labs 3/17/22 reviewed     ASSESSMENT AND PLAN:     1. Hypertension with diastolic heart failure- stable. Negative PET stress - stable. Decrease amlodipine to 10 mg 1/2 tablet daily and furosemide 40 mg 1/2 tablet daily  2.  OA neck and lumbar spine - on oxycodone  3.  Gout - on allopurinol.   4.  Mood disorder -  on duloxetine  5.  Swallowing disorder - s/p EGD with dilation on 11/11/21.   6. Asthma - stable   7. Gerd -should be taking on pantoprazole 40 mg twice daily - refilled  8. Diabetes with neuropathy- watch sugars- discussed diet. labs  9. Hyperlipidemia - on  lipitor  10. Allergic rhinitis - stable  12. CRI - stable  13. Obesity - discussed diet, exercise and weight loss  14. CAD -risk factor modification  15. Aortic atherosclerosis and possible mesenteric artery stenosis- risk factor modification  16. Tortuous aorta - HTN controlled  17. Colon polyps - Colonoscopy in 8/2013 - 2 polyps. Flex sig 11/2013 - mild granular mucosa. " Colonoscopy 6/2017 diverticulosis - no polyps due 2023.   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 .Restless leg syndrome - on roprinole    20. Morbid obestiy - working on diet.   .  MMG 6/21     I will see her back in 3 month sooner if problems arise     Niece is Kacie Mast - encouraged her to have her niece come help her with medication management. I suspect that she is not taking her medications correctly which is contributing to her problems. ALso suspect depression as a main reason for her symptoms.   Stressed importance of taking duloxetine regularly.

## 2022-05-16 ENCOUNTER — OFFICE VISIT (OUTPATIENT)
Dept: NEUROSURGERY | Facility: CLINIC | Age: 82
End: 2022-05-16
Payer: MEDICARE

## 2022-05-16 ENCOUNTER — HOSPITAL ENCOUNTER (OUTPATIENT)
Dept: RADIOLOGY | Facility: HOSPITAL | Age: 82
Discharge: HOME | End: 2022-05-16
Attending: NURSE PRACTITIONER
Payer: MEDICARE

## 2022-05-16 VITALS
HEIGHT: 68 IN | BODY MASS INDEX: 33.8 KG/M2 | HEART RATE: 82 BPM | TEMPERATURE: 99 F | DIASTOLIC BLOOD PRESSURE: 95 MMHG | SYSTOLIC BLOOD PRESSURE: 167 MMHG | WEIGHT: 223 LBS

## 2022-05-16 DIAGNOSIS — M79.605 LEFT LEG PAIN: ICD-10-CM

## 2022-05-16 DIAGNOSIS — G89.29 CHRONIC BILATERAL LOW BACK PAIN WITH SCIATICA, SCIATICA LATERALITY UNSPECIFIED: ICD-10-CM

## 2022-05-16 DIAGNOSIS — M54.40 CHRONIC BILATERAL LOW BACK PAIN WITH SCIATICA, SCIATICA LATERALITY UNSPECIFIED: ICD-10-CM

## 2022-05-16 DIAGNOSIS — M54.16 LUMBAR RADICULOPATHY: Primary | ICD-10-CM

## 2022-05-16 PROCEDURE — 3077F PR MOST RECENT SYSTOLIC BLOOD PRESSURE >= 140 MM HG: ICD-10-PCS | Mod: CPTII,S$GLB,, | Performed by: NURSE PRACTITIONER

## 2022-05-16 PROCEDURE — 72114 X-RAY EXAM L-S SPINE BENDING: CPT | Mod: 26,,, | Performed by: INTERNAL MEDICINE

## 2022-05-16 PROCEDURE — 1160F RVW MEDS BY RX/DR IN RCRD: CPT | Mod: CPTII,S$GLB,, | Performed by: NURSE PRACTITIONER

## 2022-05-16 PROCEDURE — 3077F SYST BP >= 140 MM HG: CPT | Mod: CPTII,S$GLB,, | Performed by: NURSE PRACTITIONER

## 2022-05-16 PROCEDURE — 3080F DIAST BP >= 90 MM HG: CPT | Mod: CPTII,S$GLB,, | Performed by: NURSE PRACTITIONER

## 2022-05-16 PROCEDURE — 99999 PR PBB SHADOW E&M-EST. PATIENT-LVL V: CPT | Mod: PBBFAC,,, | Performed by: NURSE PRACTITIONER

## 2022-05-16 PROCEDURE — 72114 XR LUMBAR SPINE 5 VIEW WITH FLEX AND EXT: ICD-10-PCS | Mod: 26,,, | Performed by: INTERNAL MEDICINE

## 2022-05-16 PROCEDURE — 99999 PR PBB SHADOW E&M-EST. PATIENT-LVL V: ICD-10-PCS | Mod: PBBFAC,,, | Performed by: NURSE PRACTITIONER

## 2022-05-16 PROCEDURE — 3080F PR MOST RECENT DIASTOLIC BLOOD PRESSURE >= 90 MM HG: ICD-10-PCS | Mod: CPTII,S$GLB,, | Performed by: NURSE PRACTITIONER

## 2022-05-16 PROCEDURE — 1125F AMNT PAIN NOTED PAIN PRSNT: CPT | Mod: CPTII,S$GLB,, | Performed by: NURSE PRACTITIONER

## 2022-05-16 PROCEDURE — 1160F PR REVIEW ALL MEDS BY PRESCRIBER/CLIN PHARMACIST DOCUMENTED: ICD-10-PCS | Mod: CPTII,S$GLB,, | Performed by: NURSE PRACTITIONER

## 2022-05-16 PROCEDURE — 99204 PR OFFICE/OUTPT VISIT, NEW, LEVL IV, 45-59 MIN: ICD-10-PCS | Mod: S$GLB,,, | Performed by: NURSE PRACTITIONER

## 2022-05-16 PROCEDURE — 1159F PR MEDICATION LIST DOCUMENTED IN MEDICAL RECORD: ICD-10-PCS | Mod: CPTII,S$GLB,, | Performed by: NURSE PRACTITIONER

## 2022-05-16 PROCEDURE — 1125F PR PAIN SEVERITY QUANTIFIED, PAIN PRESENT: ICD-10-PCS | Mod: CPTII,S$GLB,, | Performed by: NURSE PRACTITIONER

## 2022-05-16 PROCEDURE — 99204 OFFICE O/P NEW MOD 45 MIN: CPT | Mod: S$GLB,,, | Performed by: NURSE PRACTITIONER

## 2022-05-16 PROCEDURE — 72114 X-RAY EXAM L-S SPINE BENDING: CPT | Mod: TC

## 2022-05-16 PROCEDURE — 1159F MED LIST DOCD IN RCRD: CPT | Mod: CPTII,S$GLB,, | Performed by: NURSE PRACTITIONER

## 2022-05-21 NOTE — PROGRESS NOTES
"Neurosurgery  History & Physical    SUBJECTIVE:     Chief Complaint: Lumbar stenosis    History of Present Illness: Henrietta Vilalsenor is a 82 y.o. female with PMH of non obstructive CAD, PAD, venous insufficiency, IDDM2, hypertension, morbid obesity, peripheral neuropathy, osteoarthritis s/p LTKA. She is being seen in clinic today as a referral from Dr. Reilly to discuss concerns with worsening chronic lumbar radiculopathy. States that she has struggled with back pain for several years. Denies trauma. Describes the pain as constant and aching across her low back with radiation down her legs. Rates the pain as a 9/10. Aggravating factors include standing and walking. Alleviating factors include sitting. Denies b/b dysfunction, saddle anesthesia, or gait instability.  Endorses weakness in the legs associated with tingling. Ambulates with a cane.  She has obtained PT without relief. Her last JANEE L4/5 provided some relief.     Review of patient's allergies indicates:   Allergen Reactions    Crestor [rosuvastatin]      Cramping in legs    Ezetimibe Diarrhea     Other reaction(s): abdominal pain, Diarrhea      Hydrocodone Itching    Lisinopril      Other reaction(s): cough      Sulfa (sulfonamide antibiotics) Itching and Rash           Sulfamethoxazole     Sulfamethoxazole-trimethoprim     Valsartan Swelling       Current Outpatient Medications   Medication Sig Dispense Refill    ACCU-CHEK SOFT DEV LANCETS Kit       albuterol (VENTOLIN HFA) 90 mcg/actuation inhaler Inhale 2 puffs by mouth into the lungs every 4 (four) hours as needed for Wheezing. Rescue 18 g 1    alcohol swabs (BD ALCOHOL SWABS) PadM USE  TO CHECK BLOOD SUGAR FOUR TIMES DAILY 200 each 5    allopurinoL (ZYLOPRIM) 100 MG tablet TAKE 1 TABLET (100 MG TOTAL) BY MOUTH ONCE DAILY. 90 tablet 3    amLODIPine (NORVASC) 10 MG tablet Take 1 tablet (10 mg total) by mouth once daily. 90 tablet 3    BD ULTRA-FINE OLU PEN NEEDLE 32 gauge x 5/32" Ndle Uses " "4 times daily, on multiple daily insulin injections 130 each 12    blood sugar diagnostic (ACCU-CHEK GUIDE TEST STRIPS) Strp TEST BLOOD SUGAR THREE TIMES DAILY 300 strip 3    blood-glucose meter kit Use as instructed 1 each 0    carvediloL (COREG) 25 MG tablet TAKE 1 TABLET TWICE DAILY 180 tablet 3    cholecalciferol, vitamin D3, 125 mcg (5,000 unit) capsule Take 5,000 Units by mouth once daily.      DROPLET PEN NEEDLE 29 gauge x 1/2" Ndle USE FOUR TIMES DAILY 400 each 3    DULoxetine (CYMBALTA) 60 MG capsule Take 1 capsule (60 mg total) by mouth 2 (two) times daily. 180 capsule 3    ELIQUIS 5 mg Tab TAKE 1 TABLET TWICE DAILY 180 tablet 0    fluticasone propionate (FLONASE) 50 mcg/actuation nasal spray instill 2 sprays in each nostril once daily (Patient not taking: Reported on 4/28/2022) 16 g 1    furosemide (LASIX) 40 MG tablet Take one tablet by mouth twice daily (Patient taking differently: Take one tablet by mouth twice daily) 180 tablet 1    insulin aspart U-100 (NOVOLOG) 100 unit/mL (3 mL) InPn pen Inject 24 Units into the skin before breakfast AND 28 Units daily with lunch AND 28 Units before dinner. 120 mL 3    lancets (ACCU-CHEK SOFTCLIX LANCETS) Misc CHECK BLOOD SUGAR FOUR TIMES DAILY 400 each 3    LANTUS SOLOSTAR U-100 INSULIN glargine 100 units/mL (3mL) SubQ pen INJECT  30 UNITS  INTO  THE  SKIN TWICE DAILY (Patient taking differently: Inject 60 Units into the skin once daily.) 60 mL 1    loratadine (CLARITIN) 10 mg tablet Take 1 tablet (10 mg total) by mouth once daily. 30 tablet 4    olopatadine (PATADAY) 0.2 % Drop Place 1 drop into both eyes once daily. 2.5 mL 12    oxyCODONE-acetaminophen (PERCOCET)  mg per tablet Take 1 tablet by mouth 5 (five) times daily. 150 tablet 0    pantoprazole (PROTONIX) 40 MG tablet Take 1 tablet (40 mg total) by mouth once daily. 90 tablet 4    potassium chloride SA (K-DUR,KLOR-CON) 10 MEQ tablet Take 1 tablet (10 mEq total) by mouth 2 (two) times " daily. 60 tablet 3    TRUEPLUS LANCETS 33 gauge Misc TEST BLOOD SUGAR FOUR TIMES DAILY 400 each 4     No current facility-administered medications for this visit.     Facility-Administered Medications Ordered in Other Visits   Medication Dose Route Frequency Provider Last Rate Last Admin    0.9%  NaCl infusion   Intravenous Continuous Oscar Ravi MD        tetracaine HCl (PF) 0.5 % Drop 1 drop  1 drop Right Eye On Call Procedure Keysha Valle MD   2 drop at 10/31/18 0825       Past Medical History:   Diagnosis Date    Abnormality of lung 11/08/2011    Stable bandlike opacities at the lung bases, most likely representing      Anxiety     Arthritis     CAD (coronary artery disease)     Calculus of ureter 2/22/2011    CHF (congestive heart failure)     Chronic back pain 7/29/2012    Colon polyp 9/2010; 11/2010    Colon polyps 7/29/2012    Coronary artery disease involving native coronary artery of native heart with angina pectoris     Depression     Diabetes mellitus     Diabetes mellitus type II     Diverticulitis large intestine 7/27/2015    Diverticulitis of large intestine without perforation or abscess without bleeding     Diverticulosis 09/25/2010; 11/02/2010; 11/08/2011; 7/29/2012    Duodenal disorder 08/25/2011    Duodenal erosion noted on EGD.    Duodenal ulcer, unspecified as acute or chronic, without hemorrhage, perforation, or obstruction 8/24/2011    E. coli sepsis 12/2010    Due to left ureteral stone with left nephrostomy tube - hospitalized in [place]    Esophageal dysmotility 01/24/2012    Noted on upper GI-barium swallow.    Facial weakness 1969    Left facial weakness s/p left mastoidectomy in ~ 1969.    Fatty liver 11/08/2011    Reported on CT-abdomen and in 06/2012 Gastro clinic visit note.    Gastric polyp 09/29/2010    GERD (gastroesophageal reflux disease) 7/29/2012    Hepatomegaly 11/08/2011    Reported on CT-abdomen    Herpes zoster with other nervous  system complications(053.19) 2/28/2011    Hiatal hernia 06/26/2006; 09/29/2010; 08/25/2011    Noted on barium swallow 2006; noted on  EGD 2011.   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    Trouble in sleeping     Type II or unspecified type diabetes mellitus with neurological manifestations, not stated as uncontrolled(250.60)     Type II or unspecified type diabetes mellitus with peripheral circulatory disorders, not stated as uncontrolled(250.70)     Type II or unspecified type diabetes mellitus with renal manifestations, not stated as uncontrolled(250.40)      Past Surgical History:   Procedure Laterality Date    BELPHAROPTOSIS REPAIR      s/p LAMBERTO levator repair - Dr. Dejesus    CARDIAC CATHETERIZATION      CARPAL TUNNEL RELEASE  3/13/2012    CATARACT EXTRACTION W/  INTRAOCULAR LENS IMPLANT Right 10/31/2018        CATARACT EXTRACTION W/  INTRAOCULAR LENS IMPLANT Left 03/22/2018        CHOLECYSTECTOMY      COLONOSCOPY N/A 11/16/2016    Procedure: COLONOSCOPY;  Surgeon: Chris Storey MD;  Location: [address];  Service: Endoscopy;  Laterality: N/A;    COLONOSCOPY N/A 6/14/2017    Procedure: COLONOSCOPY;  Surgeon: Chris BRAND  MD [place];  Location: [facility] (4TH FLR);  Service: Endoscopy;  Laterality: N/A;  colonoscopy in 3 months with better bowel prep. - Split PEG prep ordered    ESOPHAGOGASTRODUODENOSCOPY N/A 11/10/2021    Procedure: EGD (ESOPHAGOGASTRODUODENOSCOPY);  Surgeon: Kuldeep Velázquez MD;  Location: [facility] ENDO (4TH FLR);  Service: Endoscopy;  Laterality: N/A;  11/4-eliquis hold ok see te -tb-fully vacc-inst mail and verbal-    EXTRACORPOREAL SHOCK WAVE LITHOTRIPSY      INJECTION OF ANESTHETIC AGENT AROUND NERVE Bilateral 6/24/2020    Procedure: BLOCK, NERVE, C4-C5-C6 MEDIAL BRANCH ok per [place] to add on;  Surgeon: Oscar Ravi MD;  Location: [facility] PAIN MGT;  Service: Pain Management;  Laterality: Bilateral;    INTRAOCULAR PROSTHESES INSERTION Right 10/31/2018    Procedure: INSERTION-INTRAOCULAR LENS (IOL);  Surgeon: Keysha Valle MD;  Location: [facility] OR;  Service: Ophthalmology;  Laterality: Right;    JOINT REPLACEMENT      mastoid tumor removal  1969    Left mastoidectomy with residual left facial weakness.    NEPHROSTOMY      OOPHORECTOMY      PHACOEMULSIFICATION OF CATARACT Right 10/31/2018    Procedure: PHACOEMULSIFICATION-ASPIRATION-CATARACT;  Surgeon: Keysha Valle MD;  Location: [facility];  Service: Ophthalmology;  Laterality: Right;    TOTAL ABDOMINAL HYSTERECTOMY  1969    TAHBSO    TOTAL KNEE ARTHROPLASTY  9/13/2012    Left    TRANSFORAMINAL EPIDURAL INJECTION OF STEROID Bilateral 9/17/2019    Procedure: INJECTION, STEROID, EPIDURAL, TRANSFORAMINAL APPROACH;  Surgeon: Oscar Ravi MD;  Location: [facility] PAIN MGT;  Service: Pain Management;  Laterality: Bilateral;  B/L TFESI L4/L5    TRANSFORAMINAL EPIDURAL INJECTION OF STEROID Bilateral 10/2/2020    Procedure: INJECTION, STEROID, EPIDURAL, TRANSFORAMINAL APPROACH;  Surgeon: Oscar Ravi MD;  Location: [facility] PAIN MGT;  Service: Pain Management;  Laterality: Bilateral;  B/L TFESI L4/5    TRANSFORAMINAL EPIDURAL INJECTION OF STEROID Bilateral  2021    Procedure: INJECTION, STEROID, EPIDURAL, TRANSFORAMINAL APPROACH, L4-L5 clear to hold Eliquis 3 days;  Surgeon: Oscar Ravi MD;  Location: [facility];  Service: Pain Management;  Laterality: Bilateral;     Family History     Problem Relation (Age of Onset)    Alcohol abuse Brother    Breast cancer Sister, Maternal Aunt    Cancer Mother, Sister    Cirrhosis Brother    Dementia Brother, Brother    Diabetes Sister, Sister, Sister, Sister, Brother, Other    Drug abuse Brother, Brother    Frontotemporal dementia Brother    Heart attack Father, Sister    Heart disease Father    Hypertension Mother    Liver cancer Brother    Lupus Brother, Sister    No Known Problems Daughter, Son, Daughter, Son, Son, Maternal Grandmother, Maternal Grandfather, Paternal Grandmother, Paternal Grandfather, Maternal Uncle, Paternal Aunt, Paternal Uncle    Ovarian cancer Other    Sleep apnea Sister        Social History     Socioeconomic History    Marital status: Single    Number of children: 5   Tobacco Use    Smoking status: Former Smoker     Packs/day: 1.00     Years: 15.00     Pack years: 15.00     Types: Cigarettes     Quit date: 2000     Years since quittin.8    Smokeless tobacco: Never Used   Substance and Sexual Activity    Alcohol use: No    Drug use: No    Sexual activity: Never   Social History Narrative    Single with 5 children. Lives alone. Retired cook.     Social Determinants of Health     Financial Resource Strain: Low Risk     Difficulty of Paying Living Expenses: Not hard at all   Food Insecurity: Unknown    Ran Out of Food in the Last Year: Never true   Transportation Needs: No Transportation Needs    Lack of Transportation (Medical): No    Lack of Transportation (Non-Medical): No   Physical Activity: Inactive    Days of Exercise per Week: 0 days    Minutes of Exercise per Session: 0 min   Stress: No Stress Concern Present    Feeling of Stress : Not at all   Social Connections:  "Moderately Isolated    Frequency of Communication with Friends and Family: More than three times a week    Frequency of Social Gatherings with Friends and Family: Once a week    Attends Mu-ism Services: 1 to 4 times per year    Active Member of Clubs or Organizations: No    Attends Club or Organization Meetings: Never    Marital Status: Never    Housing Stability: Unknown    Unable to Pay for Housing in the Last Year: No    Unstable Housing in the Last Year: No       Review of Systems   Constitutional: Negative for activity change, appetite change and fever.   HENT: Negative for ear discharge.    Eyes: Negative for pain and visual disturbance.   Respiratory: Negative for cough, chest tightness and shortness of breath.    Cardiovascular: Negative for chest pain and palpitations.   Gastrointestinal: Negative for abdominal distention and abdominal pain.   Endocrine: Negative for polydipsia, polyphagia and polyuria.   Musculoskeletal: Positive for arthralgias, back pain, myalgias and neck stiffness. Negative for neck pain.   Skin: Negative for color change and wound.   Neurological: Positive for dizziness, weakness and numbness. Negative for headaches.   Psychiatric/Behavioral: Negative for behavioral problems, confusion and dysphoric mood.       OBJECTIVE:     Vital Signs  Temp: 98.6 °F (37 °C)  Pulse: 82  BP: (!) 167/95  Pain Score: 10-Worst pain ever  Height: 5' 8" (172.7 cm)  Weight: 101.2 kg (223 lb)  Body mass index is 33.91 kg/m².      Neurosurgery Physical Exam  General: well developed, well nourished, no distress.   Head: normocephalic, atraumatic  Neurologic: Alert and oriented. Thought content appropriate.  GCS: Motor: 6/Verbal: 5/Eyes: 4 GCS Total: 15  Mental Status: Awake, Alert, Oriented x 4  Language: No aphasia  Speech: No dysarthria  Cranial nerves: face symmetric, tongue midline, CN II-XII grossly intact.   Eyes: pupils equal, round, reactive to light with accomodation, EOMI. "   Pulmonary: normal respirations, no signs of respiratory distress  Abdomen: soft, non-distended  Skin: Skin is warm, dry and intact.  Sensory: intact to light touch throughout  Motor Strength:Moves all extremities spontaneously with good tone.      Strength  Deltoids Triceps Biceps Wrist Extension Wrist Flexion Hand    Upper: R 5/5 5/5 5/5 5/5 5/5 5/5    L 5/5 5/5 5/5 5/5 5/5 5/5     HF KE KF DF PF EHL   Lower: R 4/5 4/5 4/5 4/5 4/5 4/5    L 4/5 4/5 4/5 4/5 4/5 4/5     Reflexes:   DTR: 1+ symmetrically throughout.  Nichols's: Negative.     Cerebellar:   Gait antalgic with use of cane     Cervical:   ROM: Full with flexion, extension, lateral rotation and ear-to-shoulder bend.   Midline TTP: Negative.     Thoracic:  Midline TTP: Negative.     Lumbar:  Midline TTP: Positive  Straight Leg Test: Positive L >R    Other:  SI joint TTP: Negative.  Greater trochanter TTP: Negative.  Tenderness with external/internal hip rotation: Negative.    Diagnostic Results:  I have personally reviewed the MRI lumbar spine dated 11/12/21. The imaging shows multilevel degenerative disc disease, most severe at L4-L5 with moderate spinal canal and bilateral neural foraminal narrowing.     ASSESSMENT/PLAN:   Henrietta Villasenor is a 82 y.o. female with PMH of non obstructive CAD, PAD, venous insufficiency, IDDM2, hypertension, morbid obesity, peripheral neuropathy, osteoarthritis s/p LTKA. She was seen in clinic today as a referral from Dr. Reilly to discuss concerns with worsening chronic lumbar radiculopathy and stenosis. We discussed that the patient could benefit from surgery; however, she is not interested at this time. An x-ray has been obtained to evaluate for instability. She is interested in obtaining another injection with pain management as she obtained relief in the past. I will reach out to Dr. Ravi's office.    I would like the patient to follow-up in clinic as needed. I have encouraged her to contact the clinic with any  questions, concerns, or adverse clinical changes. She verbalized understanding.     STERLING Edwards-AKIN  Neurosurgery  [facility] [place].     Note dictated with voice recognition software, please excuse any grammatical errors.

## 2022-06-08 ENCOUNTER — TELEPHONE (OUTPATIENT)
Dept: GASTROENTEROLOGY | Facility: CLINIC | Age: 82
End: 2022-06-08
Payer: MEDICARE

## 2022-06-08 NOTE — TELEPHONE ENCOUNTER
----- Message from Curly [place] MD sent at 6/8/2022  8:11 AM CDT -----  Regarding: RE: pt  Yes please  ----- Message -----  From: Andreina Flowers MA  Sent: 6/8/2022   7:43 AM CDT  To: Curly [place] MD  Subject: FW: pt                                           Hi, Looks like your ordered a mesenteric ultrasound some months ago.  Do you still want her to have this done?   Natasha  ----- Message -----  From: Astrid Pham  Sent: 11/2/2021   3:51 PM CDT  To: [facility] Staff  Subject: pt                                               Pt is calling to speak with the nurse in ref to her vascular test that she missed today pt thought her appt was for tomorrow at 11:00 am can you please call pt at [phone]    KIMANI

## 2022-06-08 NOTE — TELEPHONE ENCOUNTER
Spoke with patient.  Scheduled for doppler ultrasound on 6/15 for 8:00 in the vascular lab, 5th floor, main clinic on [facility].  Patient aware she will need to fast after midnight.  Confirmation mailed.   Natasha

## 2022-06-09 ENCOUNTER — HOSPITAL ENCOUNTER (OUTPATIENT)
Dept: RADIOLOGY | Facility: HOSPITAL | Age: 82
Discharge: HOME | End: 2022-06-09
Attending: INTERNAL MEDICINE
Payer: MEDICARE

## 2022-06-09 ENCOUNTER — OFFICE VISIT (OUTPATIENT)
Dept: INTERNAL MEDICINE | Facility: CLINIC | Age: 82
End: 2022-06-09
Payer: MEDICARE

## 2022-06-09 VITALS
OXYGEN SATURATION: 96 % | HEART RATE: 79 BPM | WEIGHT: 225.31 LBS | DIASTOLIC BLOOD PRESSURE: 70 MMHG | HEIGHT: 68 IN | SYSTOLIC BLOOD PRESSURE: 139 MMHG | BODY MASS INDEX: 34.15 KG/M2

## 2022-06-09 DIAGNOSIS — Z12.31 SCREENING MAMMOGRAM FOR BREAST CANCER: ICD-10-CM

## 2022-06-09 DIAGNOSIS — M25.572 ACUTE LEFT ANKLE PAIN: Primary | ICD-10-CM

## 2022-06-09 DIAGNOSIS — K21.9 GASTROESOPHAGEAL REFLUX DISEASE WITHOUT ESOPHAGITIS: ICD-10-CM

## 2022-06-09 DIAGNOSIS — I70.0 AORTIC ATHEROSCLEROSIS: ICD-10-CM

## 2022-06-09 DIAGNOSIS — M25.572 ACUTE LEFT ANKLE PAIN: ICD-10-CM

## 2022-06-09 DIAGNOSIS — I10 ESSENTIAL HYPERTENSION: Chronic | ICD-10-CM

## 2022-06-09 DIAGNOSIS — G89.4 CHRONIC PAIN DISORDER: ICD-10-CM

## 2022-06-09 DIAGNOSIS — Z79.4 TYPE 2 DIABETES MELLITUS WITH DIABETIC POLYNEUROPATHY, WITH LONG-TERM CURRENT USE OF INSULIN: ICD-10-CM

## 2022-06-09 DIAGNOSIS — N18.30 TYPE 2 DIABETES MELLITUS WITH STAGE 3 CHRONIC KIDNEY DISEASE, WITH LONG-TERM CURRENT USE OF INSULIN: ICD-10-CM

## 2022-06-09 DIAGNOSIS — E11.42 DIABETIC POLYNEUROPATHY ASSOCIATED WITH TYPE 2 DIABETES MELLITUS: ICD-10-CM

## 2022-06-09 DIAGNOSIS — E11.22 TYPE 2 DIABETES MELLITUS WITH STAGE 3 CHRONIC KIDNEY DISEASE, WITH LONG-TERM CURRENT USE OF INSULIN: ICD-10-CM

## 2022-06-09 DIAGNOSIS — I25.119 CORONARY ARTERY DISEASE INVOLVING NATIVE CORONARY ARTERY OF NATIVE HEART WITH ANGINA PECTORIS: ICD-10-CM

## 2022-06-09 DIAGNOSIS — M10.9 GOUT, ARTHRITIS: ICD-10-CM

## 2022-06-09 DIAGNOSIS — E11.42 TYPE 2 DIABETES MELLITUS WITH DIABETIC POLYNEUROPATHY, WITH LONG-TERM CURRENT USE OF INSULIN: ICD-10-CM

## 2022-06-09 DIAGNOSIS — Z79.4 TYPE 2 DIABETES MELLITUS WITH STAGE 3 CHRONIC KIDNEY DISEASE, WITH LONG-TERM CURRENT USE OF INSULIN: ICD-10-CM

## 2022-06-09 PROCEDURE — 1125F AMNT PAIN NOTED PAIN PRSNT: CPT | Mod: CPTII,S$GLB,, | Performed by: INTERNAL MEDICINE

## 2022-06-09 PROCEDURE — 73610 X-RAY EXAM OF ANKLE: CPT | Mod: TC,LT

## 2022-06-09 PROCEDURE — 3078F DIAST BP <80 MM HG: CPT | Mod: CPTII,S$GLB,, | Performed by: INTERNAL MEDICINE

## 2022-06-09 PROCEDURE — 99999 PR PBB SHADOW E&M-EST. PATIENT-LVL IV: ICD-10-PCS | Mod: PBBFAC,,, | Performed by: INTERNAL MEDICINE

## 2022-06-09 PROCEDURE — 73610 X-RAY EXAM OF ANKLE: CPT | Mod: 26,LT,, | Performed by: RADIOLOGY

## 2022-06-09 PROCEDURE — 1100F PTFALLS ASSESS-DOCD GE2>/YR: CPT | Mod: CPTII,S$GLB,, | Performed by: INTERNAL MEDICINE

## 2022-06-09 PROCEDURE — 99214 PR OFFICE/OUTPT VISIT, EST, LEVL IV, 30-39 MIN: ICD-10-PCS | Mod: S$GLB,,, | Performed by: INTERNAL MEDICINE

## 2022-06-09 PROCEDURE — 99999 PR PBB SHADOW E&M-EST. PATIENT-LVL IV: CPT | Mod: PBBFAC,,, | Performed by: INTERNAL MEDICINE

## 2022-06-09 PROCEDURE — 3288F FALL RISK ASSESSMENT DOCD: CPT | Mod: CPTII,S$GLB,, | Performed by: INTERNAL MEDICINE

## 2022-06-09 PROCEDURE — 3078F PR MOST RECENT DIASTOLIC BLOOD PRESSURE < 80 MM HG: ICD-10-PCS | Mod: CPTII,S$GLB,, | Performed by: INTERNAL MEDICINE

## 2022-06-09 PROCEDURE — 99214 OFFICE O/P EST MOD 30 MIN: CPT | Mod: S$GLB,,, | Performed by: INTERNAL MEDICINE

## 2022-06-09 PROCEDURE — 1159F PR MEDICATION LIST DOCUMENTED IN MEDICAL RECORD: ICD-10-PCS | Mod: CPTII,S$GLB,, | Performed by: INTERNAL MEDICINE

## 2022-06-09 PROCEDURE — 3288F PR FALLS RISK ASSESSMENT DOCUMENTED: ICD-10-PCS | Mod: CPTII,S$GLB,, | Performed by: INTERNAL MEDICINE

## 2022-06-09 PROCEDURE — 99499 RISK ADDL DX/OHS AUDIT: ICD-10-PCS | Mod: S$GLB,,, | Performed by: INTERNAL MEDICINE

## 2022-06-09 PROCEDURE — 3075F SYST BP GE 130 - 139MM HG: CPT | Mod: CPTII,S$GLB,, | Performed by: INTERNAL MEDICINE

## 2022-06-09 PROCEDURE — 3075F PR MOST RECENT SYSTOLIC BLOOD PRESS GE 130-139MM HG: ICD-10-PCS | Mod: CPTII,S$GLB,, | Performed by: INTERNAL MEDICINE

## 2022-06-09 PROCEDURE — 1100F PR PT FALLS ASSESS DOC 2+ FALLS/FALL W/INJURY/YR: ICD-10-PCS | Mod: CPTII,S$GLB,, | Performed by: INTERNAL MEDICINE

## 2022-06-09 PROCEDURE — 73610 XR ANKLE COMPLETE 3 VIEW LEFT: ICD-10-PCS | Mod: 26,LT,, | Performed by: RADIOLOGY

## 2022-06-09 PROCEDURE — 99499 UNLISTED E&M SERVICE: CPT | Mod: S$GLB,,, | Performed by: INTERNAL MEDICINE

## 2022-06-09 PROCEDURE — 1125F PR PAIN SEVERITY QUANTIFIED, PAIN PRESENT: ICD-10-PCS | Mod: CPTII,S$GLB,, | Performed by: INTERNAL MEDICINE

## 2022-06-09 PROCEDURE — 1159F MED LIST DOCD IN RCRD: CPT | Mod: CPTII,S$GLB,, | Performed by: INTERNAL MEDICINE

## 2022-06-09 RX ORDER — OXYCODONE AND ACETAMINOPHEN 10; 325 MG/1; MG/1
1 TABLET ORAL
Qty: 150 TABLET | Refills: 0 | Status: SHIPPED | OUTPATIENT
Start: 2022-06-09 | End: 2022-09-09 | Stop reason: SDUPTHER

## 2022-06-09 RX ORDER — OXYCODONE AND ACETAMINOPHEN 10; 325 MG/1; MG/1
1 TABLET ORAL
Qty: 150 TABLET | Refills: 0 | Status: SHIPPED | OUTPATIENT
Start: 2022-08-08 | End: 2022-09-08 | Stop reason: SDUPTHER

## 2022-06-09 RX ORDER — INSULIN ASPART 100 [IU]/ML
INJECTION, SOLUTION INTRAVENOUS; SUBCUTANEOUS
Qty: 120 ML | Refills: 3 | Status: SHIPPED | OUTPATIENT
Start: 2022-06-09 | End: 2023-05-24

## 2022-06-09 RX ORDER — INSULIN GLARGINE 100 [IU]/ML
60 INJECTION, SOLUTION SUBCUTANEOUS DAILY
Qty: 60 ML | Refills: 6 | Status: SHIPPED | OUTPATIENT
Start: 2022-06-09 | End: 2023-05-24

## 2022-06-09 RX ORDER — OXYCODONE AND ACETAMINOPHEN 10; 325 MG/1; MG/1
1 TABLET ORAL
Qty: 150 TABLET | Refills: 0 | Status: SHIPPED | OUTPATIENT
Start: 2022-07-08 | End: 2022-09-09 | Stop reason: SDUPTHER

## 2022-06-09 NOTE — PROGRESS NOTES
CHIEF COMPLAINT:  Follow up of multple problems.      HISTORY OF PRESENT ILLNESS: This is a 82-year-old woman who presents for follow up of above.      She continues to have n bora and shoulder pain, back pain. She complains of back pain and body pain  which has been severe. She is taking oxycodone apap 10/325 up to 5 times daily.  SHe continues to take duloxetine 60 mg twice daily.  Her mood is doing ok.  Her pain affects her mood.       She saw Dr Reilly on 1/18/22 - she continues to take Eliquis 5 mg wice daily.  She had cardiac PET stress 2/9/2022 which was negative for ischemia          No fever or chills. She has felt cold.  No nausea, vomiting, constipation, dysuria, hematuira.  She denies swallowing problems today.  She occasionally has pain when she urinates. Vaseline to the vaginal area helps.  Occasional diarrhea      She is living alone. She does not see her grandson very  much. She has a niece, Kacie Mast who lives locally.      She is taking  amlodipine 10 mg 1/2 daily, coreg 25 mg twice daily and lasix 40 mg 1/2  tablet once daily for her hypertension and diastolic dysfunction.      She has gout. She is taking allopurinol 100 mg daily      She saw Endocrinology on 10/22/20. She should take Lantus 60 units daily and Novolog 24 units once or twice daily with meals.  Blood sugar has been up and down. 125-200.  Weight has been stable.       PAST MEDICAL HISTORY:   1. Diabetes mellitus   2. Hypertension   3. Hyperlipidemia   4. Left heart catheterization January 2007 which revealed luminal irregularities in the LAD, left circumflex and right coronary artery. She had diastolic dysfunction and patent renal arteries.   5. Sleep apnea   6. Obesity.   7. Nephrolithiasis status post lithotripsy.   8. Reflux - had nonerosive gastropathy on EGD September 2007. Gastritis on EGD 9/2010  9. Hidradenitis suppurativa.   10. History of diverticulitis with a hospitalized October 2007.   11. Migraines   12. Obesity.   13.  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    14. Total hysterectomy in 1969.   15. Cholecystectomy was done at that time as well.   16. 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   17. Colon polyps on colonscopy 11/2010 - due 2013   18. Hospitalization 12/10 for e coli sepsis due to left ureteral stone with left nephrostomy tube in [place], [place]   19. Left knee replacement 9/2012      MEDICATIONS and ALLERGIES: Updated on EPIC.     PHYSICAL EXAMINATION:         /70   Pulse 79   Ht 5' 8" (1.727 m)   Wt 102.2 kg (225 lb 5 oz)   LMP  (LMP Unknown)   SpO2 96%   BMI 34.26 kg/m²      GENERAL: She is alert, oriented, no apparent distress. Affect within   normal limits.  Conjunctiva anicteric.    NECK: Supple.   Respiratory: Effort normal. Lungs clear  HEART: Regular rate and rhythm without murmurs, gallops or rubs.   No lower extremity edema.    Swelling at the left ankle.       labs 3/17/22 reviewed     ASSESSMENT AND PLAN:      1. Hypertension with diastolic heart failure- stable. Negative PET stress.  Increase amlodipine to 10 mg to one tablet daily and continue furosemide 40 mg 1/2 tablet daily. Continue carvedilol 25 mg one tablet twice daily  2.  OA neck and lumbar spine - on oxycodone  3.  Gout - on allopurinol.   4.  Mood disorder -  on duloxetine  5.  Swallowing disorder - s/p EGD with dilation on 11/11/21.   6. Asthma - stable   7. Gerd -should be taking on pantoprazole 40 mg twice daily - refilled. To have vascular studies of her GI tract.   8. Diabetes with neuropathy- watch sugars- discussed diet. labs  9. Hyperlipidemia - on  lipitor  10. Allergic rhinitis - stable  12. CRI - stable  13. Obesity - discussed diet, exercise and weight loss  14. CAD -risk factor modification  15. Aortic atherosclerosis and possible mesenteric artery stenosis- risk factor modification  16. Tortuous aorta - HTN controlled  17. Colon polyps - " Colonoscopy in 8/2013 - 2 polyps. Flex sig 11/2013 - mild granular mucosa. Colonoscopy 6/2017 diverticulosis - no polyps due 2023.   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 .Restless leg syndrome - on roprinole    20. Morbid obestiy - working on diet.   21.  Left ankle swelling - xray to rule out fracture    .  MMG 6/21     I will see her back in 3 month sooner if problems arise

## 2022-06-09 NOTE — PATIENT INSTRUCTIONS
Increase amlodipine to 10 mg to one tablet daily   continue furosemide 40 mg 1/2 tablet daily.   Continue carvedilol 25 mg one tablet twice daily

## 2022-06-10 ENCOUNTER — TELEPHONE (OUTPATIENT)
Dept: INTERNAL MEDICINE | Facility: CLINIC | Age: 82
End: 2022-06-10
Payer: MEDICARE

## 2022-06-15 ENCOUNTER — HOSPITAL ENCOUNTER (OUTPATIENT)
Dept: VASCULAR SURGERY | Facility: CLINIC | Age: 82
Discharge: HOME | End: 2022-06-15
Attending: STUDENT IN AN ORGANIZED HEALTH CARE EDUCATION/TRAINING PROGRAM
Payer: MEDICARE

## 2022-06-15 DIAGNOSIS — R10.9 ABDOMINAL PAIN, UNSPECIFIED ABDOMINAL LOCATION: ICD-10-CM

## 2022-06-15 PROCEDURE — 93975 VASCULAR STUDY: CPT | Mod: S$GLB,,, | Performed by: SURGERY

## 2022-06-15 PROCEDURE — 93975 PR DUPLEX ABD/PEL VASC STUDY,COMPLETE: ICD-10-PCS | Mod: S$GLB,,, | Performed by: SURGERY

## 2022-06-18 NOTE — PROGRESS NOTES
Dr. Hampton - I think this is your clinic patient's results.  What are your recommendations?    Technically challenging study due to overlying bowel gas a body habitus.     Impression   =========     Color flow duplex exam reveals a patent abdominal aorta, celiac artery and superior mesenteric artery. A velocity elevation of 207   cm/s is noted in the Celiac artery suggesting a moderate stenosis. DARRYL could not be visualized at this time due to overlying bowel   gas.       DATE OF SERVICE: 06/15/2022

## 2022-06-20 DIAGNOSIS — I77.1 STENOSIS OF CELIAC ARTERY: Primary | ICD-10-CM

## 2022-06-20 DIAGNOSIS — R10.84 GENERALIZED POSTPRANDIAL ABDOMINAL PAIN: ICD-10-CM

## 2022-07-04 NOTE — PROGRESS NOTES
Jace please order basic metabolic panel in please schedule patient for CTA abdomen and pelvis for further evaluation of abdominal pain and mesenteric arterial stenosis orders have been placed thank you

## 2022-07-06 ENCOUNTER — TELEPHONE (OUTPATIENT)
Dept: GASTROENTEROLOGY | Facility: CLINIC | Age: 82
End: 2022-07-06
Payer: MEDICARE

## 2022-07-06 NOTE — TELEPHONE ENCOUNTER
Contact and spoke with patient per Jace Pathak please order basic metabolic panel in please schedule patient for CTA abdomen and pelvis for further evaluation of abdominal pain and mesenteric arterial stenosis orders have been placed thank you     Patient verbalized understanding.

## 2022-07-06 NOTE — TELEPHONE ENCOUNTER
----- Message from Chris Storey MD sent at 7/4/2022 12:45 PM CDT -----  Jace please order basic metabolic panel in please schedule patient for CTA abdomen and pelvis for further evaluation of abdominal pain and mesenteric arterial stenosis orders have been placed thank you

## 2022-07-07 ENCOUNTER — LAB VISIT (OUTPATIENT)
Dept: LAB | Facility: HOSPITAL | Age: 82
End: 2022-07-07
Attending: INTERNAL MEDICINE
Payer: MEDICARE

## 2022-07-07 DIAGNOSIS — R10.84 GENERALIZED POSTPRANDIAL ABDOMINAL PAIN: ICD-10-CM

## 2022-07-07 DIAGNOSIS — I77.1 STENOSIS OF CELIAC ARTERY: ICD-10-CM

## 2022-07-07 LAB
ANION GAP SERPL CALC-SCNC: 8 MMOL/L (ref 8–16)
BUN SERPL-MCNC: 14 MG/DL (ref 8–23)
CALCIUM SERPL-MCNC: 9.8 MG/DL (ref 8.7–10.5)
CHLORIDE SERPL-SCNC: 102 MMOL/L (ref 95–110)
CO2 SERPL-SCNC: 30 MMOL/L (ref 23–29)
CREAT SERPL-MCNC: 1.2 MG/DL (ref 0.5–1.4)
EST. GFR  (AFRICAN AMERICAN): 48.6 ML/MIN/1.73 M^2
EST. GFR  (NON AFRICAN AMERICAN): 42.2 ML/MIN/1.73 M^2
GLUCOSE SERPL-MCNC: 181 MG/DL (ref 70–110)
POTASSIUM SERPL-SCNC: 4.2 MMOL/L (ref 3.5–5.1)
SODIUM SERPL-SCNC: 140 MMOL/L (ref 136–145)

## 2022-07-07 PROCEDURE — 36415 COLL VENOUS BLD VENIPUNCTURE: CPT | Performed by: INTERNAL MEDICINE

## 2022-07-07 PROCEDURE — 80048 BASIC METABOLIC PNL TOTAL CA: CPT | Performed by: INTERNAL MEDICINE

## 2022-07-09 NOTE — PROGRESS NOTES
Jace please tell Henrietta that her lab work is stable glucose has improved but still slightly elevated

## 2022-07-14 ENCOUNTER — TELEPHONE (OUTPATIENT)
Dept: GASTROENTEROLOGY | Facility: CLINIC | Age: 82
End: 2022-07-14
Payer: MEDICARE

## 2022-07-14 NOTE — TELEPHONE ENCOUNTER
Contact patient per Dr. Storey, please tell Henrietta that her lab work is stable glucose has improved but still slightly elevated     No answer left message for patient to call the office back.

## 2022-07-14 NOTE — TELEPHONE ENCOUNTER
----- Message from Chris Storey MD sent at 7/9/2022  3:47 PM CDT -----  Jace please tell Henrietta that her lab work is stable glucose has improved but still slightly elevated

## 2022-07-15 ENCOUNTER — HOSPITAL ENCOUNTER (OUTPATIENT)
Dept: RADIOLOGY | Facility: HOSPITAL | Age: 82
Discharge: HOME | End: 2022-07-15
Attending: INTERNAL MEDICINE
Payer: MEDICARE

## 2022-07-15 DIAGNOSIS — M79.605 LEFT LEG PAIN: ICD-10-CM

## 2022-07-15 DIAGNOSIS — Z12.31 SCREENING MAMMOGRAM FOR BREAST CANCER: ICD-10-CM

## 2022-07-15 PROCEDURE — 77063 BREAST TOMOSYNTHESIS BI: CPT | Mod: 26,,, | Performed by: RADIOLOGY

## 2022-07-15 PROCEDURE — 75635 CTA RUNOFF ABD PEL BILAT LOWER EXT: ICD-10-PCS | Mod: 26,,, | Performed by: RADIOLOGY

## 2022-07-15 PROCEDURE — 75635 CT ANGIO ABDOMINAL ARTERIES: CPT | Mod: TC

## 2022-07-15 PROCEDURE — 75635 CT ANGIO ABDOMINAL ARTERIES: CPT | Mod: 26,,, | Performed by: RADIOLOGY

## 2022-07-15 PROCEDURE — 77067 MAMMO DIGITAL SCREENING BILAT WITH TOMO: ICD-10-PCS | Mod: 26,,, | Performed by: RADIOLOGY

## 2022-07-15 PROCEDURE — 77067 SCR MAMMO BI INCL CAD: CPT | Mod: TC

## 2022-07-15 PROCEDURE — 77067 SCR MAMMO BI INCL CAD: CPT | Mod: 26,,, | Performed by: RADIOLOGY

## 2022-07-15 PROCEDURE — 25500020 PHARM REV CODE 255: Performed by: INTERNAL MEDICINE

## 2022-07-15 PROCEDURE — 77063 MAMMO DIGITAL SCREENING BILAT WITH TOMO: ICD-10-PCS | Mod: 26,,, | Performed by: RADIOLOGY

## 2022-07-15 RX ADMIN — IOHEXOL 100 ML: 350 INJECTION, SOLUTION INTRAVENOUS at 10:07

## 2022-07-19 ENCOUNTER — TELEPHONE (OUTPATIENT)
Dept: CARDIOLOGY | Facility: CLINIC | Age: 82
End: 2022-07-19

## 2022-07-19 ENCOUNTER — OFFICE VISIT (OUTPATIENT)
Dept: PODIATRY | Facility: CLINIC | Age: 82
End: 2022-07-19
Payer: MEDICARE

## 2022-07-19 ENCOUNTER — OFFICE VISIT (OUTPATIENT)
Dept: CARDIOLOGY | Facility: CLINIC | Age: 82
End: 2022-07-19
Payer: MEDICARE

## 2022-07-19 VITALS
BODY MASS INDEX: 33.71 KG/M2 | WEIGHT: 222.44 LBS | DIASTOLIC BLOOD PRESSURE: 86 MMHG | OXYGEN SATURATION: 99 % | HEIGHT: 68 IN | SYSTOLIC BLOOD PRESSURE: 122 MMHG | HEART RATE: 78 BPM

## 2022-07-19 DIAGNOSIS — L84 CORN OR CALLUS: ICD-10-CM

## 2022-07-19 DIAGNOSIS — B35.1 ONYCHOMYCOSIS DUE TO DERMATOPHYTE: ICD-10-CM

## 2022-07-19 DIAGNOSIS — I10 ESSENTIAL HYPERTENSION: Primary | Chronic | ICD-10-CM

## 2022-07-19 DIAGNOSIS — I73.9 PAD (PERIPHERAL ARTERY DISEASE): ICD-10-CM

## 2022-07-19 DIAGNOSIS — E78.2 MIXED HYPERLIPIDEMIA: ICD-10-CM

## 2022-07-19 DIAGNOSIS — E11.42 DIABETIC POLYNEUROPATHY ASSOCIATED WITH TYPE 2 DIABETES MELLITUS: Primary | ICD-10-CM

## 2022-07-19 DIAGNOSIS — I50.32 CHRONIC DIASTOLIC CONGESTIVE HEART FAILURE: ICD-10-CM

## 2022-07-19 DIAGNOSIS — I87.2 VENOUS INSUFFICIENCY OF BOTH LOWER EXTREMITIES: ICD-10-CM

## 2022-07-19 DIAGNOSIS — E78.00 HYPERCHOLESTEROLEMIA: ICD-10-CM

## 2022-07-19 PROCEDURE — 3288F FALL RISK ASSESSMENT DOCD: CPT | Mod: CPTII,S$GLB,, | Performed by: INTERNAL MEDICINE

## 2022-07-19 PROCEDURE — 99999 PR PBB SHADOW E&M-EST. PATIENT-LVL V: CPT | Mod: PBBFAC,,, | Performed by: INTERNAL MEDICINE

## 2022-07-19 PROCEDURE — 99499 UNLISTED E&M SERVICE: CPT | Mod: S$GLB,,, | Performed by: PODIATRIST

## 2022-07-19 PROCEDURE — 1125F PR PAIN SEVERITY QUANTIFIED, PAIN PRESENT: ICD-10-PCS | Mod: CPTII,S$GLB,, | Performed by: INTERNAL MEDICINE

## 2022-07-19 PROCEDURE — 3079F PR MOST RECENT DIASTOLIC BLOOD PRESSURE 80-89 MM HG: ICD-10-PCS | Mod: CPTII,S$GLB,, | Performed by: INTERNAL MEDICINE

## 2022-07-19 PROCEDURE — 99499 NO LOS: ICD-10-PCS | Mod: S$GLB,,, | Performed by: PODIATRIST

## 2022-07-19 PROCEDURE — 1100F PTFALLS ASSESS-DOCD GE2>/YR: CPT | Mod: CPTII,S$GLB,, | Performed by: INTERNAL MEDICINE

## 2022-07-19 PROCEDURE — 3074F SYST BP LT 130 MM HG: CPT | Mod: CPTII,S$GLB,, | Performed by: INTERNAL MEDICINE

## 2022-07-19 PROCEDURE — 99215 PR OFFICE/OUTPT VISIT, EST, LEVL V, 40-54 MIN: ICD-10-PCS | Mod: S$GLB,,, | Performed by: INTERNAL MEDICINE

## 2022-07-19 PROCEDURE — 1159F PR MEDICATION LIST DOCUMENTED IN MEDICAL RECORD: ICD-10-PCS | Mod: CPTII,S$GLB,, | Performed by: INTERNAL MEDICINE

## 2022-07-19 PROCEDURE — 3288F PR FALLS RISK ASSESSMENT DOCUMENTED: ICD-10-PCS | Mod: CPTII,S$GLB,, | Performed by: INTERNAL MEDICINE

## 2022-07-19 PROCEDURE — 99499 UNLISTED E&M SERVICE: CPT | Mod: S$GLB,,, | Performed by: STUDENT IN AN ORGANIZED HEALTH CARE EDUCATION/TRAINING PROGRAM

## 2022-07-19 PROCEDURE — 99499 RISK ADDL DX/OHS AUDIT: ICD-10-PCS | Mod: S$GLB,,, | Performed by: STUDENT IN AN ORGANIZED HEALTH CARE EDUCATION/TRAINING PROGRAM

## 2022-07-19 PROCEDURE — 11056 PARNG/CUTG B9 HYPRKR LES 2-4: CPT | Mod: Q9,S$GLB,, | Performed by: PODIATRIST

## 2022-07-19 PROCEDURE — 99999 PR PBB SHADOW E&M-EST. PATIENT-LVL V: ICD-10-PCS | Mod: PBBFAC,,, | Performed by: INTERNAL MEDICINE

## 2022-07-19 PROCEDURE — 11721 PR DEBRIDEMENT OF NAILS, 6 OR MORE: ICD-10-PCS | Mod: 59,Q9,S$GLB, | Performed by: PODIATRIST

## 2022-07-19 PROCEDURE — 1159F MED LIST DOCD IN RCRD: CPT | Mod: CPTII,S$GLB,, | Performed by: INTERNAL MEDICINE

## 2022-07-19 PROCEDURE — 11721 DEBRIDE NAIL 6 OR MORE: CPT | Mod: 59,Q9,S$GLB, | Performed by: PODIATRIST

## 2022-07-19 PROCEDURE — 99999 PR PBB SHADOW E&M-EST. PATIENT-LVL III: CPT | Mod: PBBFAC,,, | Performed by: PODIATRIST

## 2022-07-19 PROCEDURE — 3074F PR MOST RECENT SYSTOLIC BLOOD PRESSURE < 130 MM HG: ICD-10-PCS | Mod: CPTII,S$GLB,, | Performed by: INTERNAL MEDICINE

## 2022-07-19 PROCEDURE — 3079F DIAST BP 80-89 MM HG: CPT | Mod: CPTII,S$GLB,, | Performed by: INTERNAL MEDICINE

## 2022-07-19 PROCEDURE — 1125F AMNT PAIN NOTED PAIN PRSNT: CPT | Mod: CPTII,S$GLB,, | Performed by: INTERNAL MEDICINE

## 2022-07-19 PROCEDURE — 99215 OFFICE O/P EST HI 40 MIN: CPT | Mod: S$GLB,,, | Performed by: INTERNAL MEDICINE

## 2022-07-19 PROCEDURE — 1100F PR PT FALLS ASSESS DOC 2+ FALLS/FALL W/INJURY/YR: ICD-10-PCS | Mod: CPTII,S$GLB,, | Performed by: INTERNAL MEDICINE

## 2022-07-19 PROCEDURE — 99999 PR PBB SHADOW E&M-EST. PATIENT-LVL III: ICD-10-PCS | Mod: PBBFAC,,, | Performed by: PODIATRIST

## 2022-07-19 PROCEDURE — 11056 PR TRIM BENIGN HYPERKERATOTIC SKIN LESION,2-4: ICD-10-PCS | Mod: Q9,S$GLB,, | Performed by: PODIATRIST

## 2022-07-19 RX ORDER — CILOSTAZOL 100 MG/1
100 TABLET ORAL 2 TIMES DAILY
Qty: 180 TABLET | Refills: 3 | Status: SHIPPED | OUTPATIENT
Start: 2022-07-19 | End: 2022-12-08

## 2022-07-19 NOTE — PATIENT INSTRUCTIONS
Assessment and Plan:  Henrietta Villasenor is a 82-year-old female with non obstructive CAD, PAD, venous insufficiency, IDDM2, hypertension, morbid obesity, peripheral neuropathy, osteoarthritis s/p LTKA, who presents for a follow up appointment.    1. Acute BLE popliteal DVT- unprovoked.  BLE Venous Reflux Study on 3/30/2021 no evidence of lower extremity DVT bilaterally.  Previously noted bilateral popliteal vein DVT (12/15/2020) no longer present.  Continue anticoagulation with apixaban 5 mg twice a day.    2. BLE edema -Due to venous insufficiency.  BLE Venous Reflux Study on 3/30/2021 revealed minimal BLE venous reflux. Continue to us compression stockings.  Pt to elevate legs when resting.  LImit sodium intake up tp 2 g a day and fluid intake to 1.5 L a day.        3. Chest pain- Mrs. Villasenor states chest pain has fully resolved.  PET Stress Test on 2/9/2022 revealed no clinically significant regional resting or stress induced perfusion abnormalities.  Continue current medications.     4. PAD- No claudication, rest pain, or tissue loss. CTA with results as mentioned above. Start cilostazol 100mg BID. Continue asa 81 mg and start Atorvastatin 40 mg for medical management of peripheral artery disease. Encourage daily exercise 30 mins per day, 5 days per week.    5. HTN- Controlled. Continue current meds.    6. Obesity- Encourage dietary modification and moderate/aerobic exercise for a minimum of 30 minutes for 5 days a week.     7. Back Pain/Leg Pain- Likely due to severe degenerative disc disease.  MRI Lumbar Spine 11/12/2021 revealed multilevel degenerative disc disease, most severe at L4-L5 with moderate spinal canal and bilateral neural foraminal narrowing.  Referred to Neurosurgery for evaluation, they recommended surgery to improve her symptoms however she is not interested in surgery at this time. Refer to pain medicine.    Follow up in 4 months

## 2022-07-19 NOTE — PROGRESS NOTES
[facility]         Chief Complaint   Patient presents with    BLE pain        Subjective   Patient ID: Henrietta Villasenor is a 82-year-old female with non obstructive CAD, PAD, venous insufficiency, IDDM2, hypertension, morbid obesity, peripheral neuropathy, osteoarthritis s/p LTKA, who presents for a follow up appointment.    -Patient  kindly referred to Vascular medicine clinic by Andreina Aguiar PA-C for evaluation of lower extremity pain.     - At initial clinic visit on 12/01/20, Mrs. Villasenor reported progressive worsening of BLE pain and swelling in her legs for past 2 months. She reports no trauma to legs.  She reports both an aching in her thigh, knee and shin as well as sharp, stabbing intermittent pain radiating from her left lower back down her posterior leg.  She tkes Tylenol at home which helps alleviate the her pain slightly.  She reports associated leg swelling. Patient reports no claudication, CLI, tissue loss, pigmentation changes. She has no history of DVT/PE, no recent surgeries. she was seen in pain management clinic for epidural steroid injection in early October. Patient was seen in the ED on 11/23/20 due to the leg pain. X-rays of hip and knees (11/23/20) revealed no acute malalignment, loosening or displaced fracture-dislocation. BLE Venous Ultrasound (11/23/20) revealed no evidence of DVT, incidental finding of atherosclerosis of popliteal artery was noted.  BALA revealed (11/23/20) BALA of 0.88 on the right indicating mild peripheral artery disease.  Left lower extremity BALA is within normal limits at 0.99.  No hemodynamically significant stenosis demonstrated in the left lower extremity arterial system. Patient was discharged from ED.   Plan  BLE pain (Left > Right) - Pain is likely neuropathic in origin.  Pt has mild PAD, and symptoms are not classic for claudication.  LLE arterial ultrasound on 11/23/2020 revealed BALA of 0.88 on the right indicating mild peripheral artery disease.   Left lower extremity BALA is within normal limits at 0.99.  No hemodynamically significant stenosis demonstrated in the left lower extremity arterial system.   Refer to neurology for evaluation of neuropathic pain.   BLE edema -Likely due to venous insufficiency. Check BLE venous reflux study.  Pt to elevate legs when resting.  Restrict sodium intake up tp 2 g a day and fluid intake to 1.5 L a day.  PAD- LLE arterial ultrasound on 11/23/2020 revealed BALA of 0.88 on the right indicating mild peripheral artery disease.  Left lower extremity BALA is within normal limits at 0.99.  No hemodynamically significant stenosis demonstrated in the left lower extremity arterial system.  Continue asa 81 mg and start Atorvastatin 40 mg for medical management of peripheral artery disease.  Obesity  - BMI today 33.72 Kg/M2. Encourage dietary modification and moderate/aerobic exercise for a minimum of 30 minutes for 5 days a week.     Interval History (12/15/20)  Mrs. Villasenor presents today for vascular study - venous ultrasound of bilateral lower extremities at [facility].   Called by vascular technologist to evaluate the US image findings.   Dr. Mon and I examined and evaluated the patient in the vascular lab today at 1400 on 12/15/20.   She was noted to have acute DVT of bilateral popliteal veins bilaterally.  Patient continues to report pain in bilateral lower extremities (as on her clinic visit on 12/01/20).  Also, reports mild intermittent dyspnea with exertion.  Reports no chest pain, lightheadness or palpitations.    Physical exam significant for TTP bilateral lower extremities, cardiopulmonary exam was unremarkable.   Labs reviewed. Creatine 1.0 baseline.  Assessment: Acute popliteal vein DVT bilateral lower extremities. Given patient reports mild intermittent dyspnea, will evaluate for pulmonary embolism.   Plan: Check CTA chest with contrast to evaluate Pulmonary embolism.  Start apixaban 10 mg BID x 7 days,  and 5 mg BID thereafter.  Update: Chest CTA negative for pulmonary embolism.     -At follow up visit on 12/29/2020, Mrs. Villasenor reported pain in the neck radiating to the left arm and pain in the left hip radiating to the leg.   Patient also noted a lump on the left arm with bruising, close the elbow, circular and tender to palpation.    Plan:   Acute BLE popliteal DVT - Likely unprovoked.  BLE Venous US (12/15/20) revealed Bilateral popliteal vein DVT. Minimal reflux in right GSV in below-knee segment. Refer to hematology for hypercoaguable work up.  Continue anticoagulation with apixaban 5 mg twice a day.   BLE pain (Left > Right) - Pain is likely neuropathic in origin.  Patient has chronic leg and arm pain due to DJD of cervical and Lumbar spine.  Pt has mild PAD, and symptoms are not classic for claudication.  LLE arterial ultrasound on 11/23/2020 revealed BALA of 0.88 on the right indicating mild peripheral artery disease.  Left lower extremity BALA is within normal limits at 0.99.  No hemodynamically significant stenosis demonstrated in the left lower extremity arterial system.  Patient is following with pain clinic.  Refer to neurology for evaluation of neuropathic pain.   Forearm swelling - Likely due to hematoma in setting of recent fall.  Check MRI forearm with contrast to evaluate for soft tissue swelling.   BLE edema -Likely due to venous insufficiency and post thrombotic syndrome. Significant TTP on palpation, no evidence of Phlegmasia cerulea dolens. Pt to elevate legs when resting.  Restrict sodium intake up tp 2 g a day and fluid intake to 1.5 L a day.   PAD- LLE arterial ultrasound on 11/23/2020 revealed BALA of 0.88 on the right indicating mild peripheral artery disease.  Left lower extremity BALA is within normal limits at 0.99.  No hemodynamically significant stenosis demonstrated in the left lower extremity arterial system.  Continue asa 81 mg and start Atorvastatin 40 mg for medical management of  peripheral artery disease.  Obesity  - BMI today 33.42 Kg/M2. Encourage dietary modification and moderate/aerobic exercise for a minimum of 30 minutes for 5 days a week.     -At clinic visit on 2/4/2021, Mrs. Villasenor reported left arm hematoma now healed.  Pt now followed by Neurology for neuropathic pain/neuropathy.  She has no BLE claudication or tissue loss.  Tolerating Eliquis 5 mg bid with no bleeding issues.  MRI left forearm on 1/6/2021 revealed a small fluid collection within the subcutaneous soft tissues overlying the posterolateral aspect of the elbow that demonstrates imaging characteristics favoring a hematoma.    Plan:   Acute BLE popliteal DVT - Likely unprovoked.  BLE Venous US (12/15/20) revealed Bilateral popliteal vein DVT. Minimal reflux in right GSV in below-knee segment. Refer to hematology for hypercoaguable work up.  Continue anticoagulation with apixaban 5 mg twice a day.   BLE pain (Left > Right) - Pain is likely neuropathic in origin.  Pt now followed by Neurology for neuropathic pain/neuropathy.   Forearm swelling - Now resolved.    BLE edema -Likely due to venous insufficiency and post thrombotic syndrome.  Pt to elevate legs when resting.  Restrict sodium intake up tp 2 g a day and fluid intake to 1.5 L a day.   PAD- LLE arterial ultrasound on 11/23/2020 revealed BALA of 0.88 on the right indicating mild peripheral artery disease.  Left lower extremity BALA is within normal limits at 0.99.  No hemodynamically significant stenosis demonstrated in the left lower extremity arterial system.  Continue asa 81 mg and start Atorvastatin 40 mg for medical management of peripheral artery disease.  Obesity  - BMI today 33.42 Kg/M2. Encourage dietary modification and moderate/aerobic exercise for a minimum of 30 minutes for 5 days a week.     -At clinic visit on 4/6/2021, Mrs. Villasenor reported no new symptoms.  She has no significant LE edema or tissue loss.  BLE Venous Reflux Study on 3/30/2021 revealed  minimal BLE venous reflux.  There is no evidence of lower extremity DVT bilaterally.  Previously noted bilateral popliteal vein DVT (12/15/2020) no longer present.  Plan:   Acute BLE popliteal DVT- Appears unprovoked.  BLE Venous Reflux Study on 3/30/2021 no evidence of lower extremity DVT bilaterally.  Previously noted bilateral popliteal vein DVT (12/15/2020) no longer present.  Refer to hematology for evaluation.  Continue anticoagulation with apixaban 5 mg twice a day.   BLE pain (Left > Right) - Pain is likely neuropathic in origin.  Pt now followed by Neurology for neuropathic pain/neuropathy.   BLE edema -Due to venous insufficiency.  BLE Venous Reflux Study on 3/30/2021 revealed minimal BLE venous reflux.  There is no evidence of lower extremity DVT bilaterally.  Start graduated compression hose.  Pt to elevate legs when resting.  LImit sodium intake up tp 2 g a day and fluid intake to 1.5 L a day.   PAD- LLE arterial ultrasound on 11/23/2020 revealed BALA of 0.88 on the right indicating mild peripheral artery disease.  Left lower extremity BALA is within normal limits at 0.99.  No hemodynamically significant stenosis demonstrated in the left lower extremity arterial system.  Continue asa 81 mg and start Atorvastatin 40 mg for medical management of peripheral artery disease.  Obesity- Encourage dietary modification and moderate/aerobic exercise for a minimum of 30 minutes for 5 days a week.    -At clinic visit on 7/6/2021, Mrs. Villasenor reported no claudication or tissue loss.  Main complaint is pain at area of BLE varicose veins.    Plan:   Acute BLE popliteal DVT- Appears unprovoked.  BLE Venous Reflux Study on 3/30/2021 no evidence of lower extremity DVT bilaterally.  Previously noted bilateral popliteal vein DVT (12/15/2020) no longer present.  Continue anticoagulation with apixaban 5 mg twice a day.   BLE pain (Left > Right) - Pain is likely neuropathic in origin.  Pt now followed by Neurology for  neuropathic pain/neuropathy.   BLE edema -Due to venous insufficiency.  BLE Venous Reflux Study on 3/30/2021 revealed minimal BLE venous reflux.  There is no evidence of lower extremity DVT bilaterally.  Continue graduated compression hose.  Pt to elevate legs when resting.  LImit sodium intake up tp 2 g a day and fluid intake to 1.5 L a day.   PAD- LLE arterial ultrasound on 11/23/2020 revealed BALA of 0.88 on the right indicating mild peripheral artery disease.  Left lower extremity BALA is within normal limits at 0.99.  No hemodynamically significant stenosis demonstrated in the left lower extremity arterial system.  Continue asa 81 mg and start Atorvastatin 40 mg for medical management of peripheral artery disease.  Obesity- Encourage dietary modification and moderate/aerobic exercise for a minimum of 30 minutes for 5 days a week.     10/12/2021 clinic visit: Mrs. Villasenor reports frequent episodes of neuropathic pain.  She has no significant edema, claudication or tissue loss.    Plan:  Acute BLE popliteal DVT- Appears unprovoked.  BLE Venous Reflux Study on 3/30/2021 no evidence of lower extremity DVT bilaterally.  Previously noted bilateral popliteal vein DVT (12/15/2020) no longer present.  Continue anticoagulation with apixaban 5 mg twice a day.   BLE pain (Left > Right) - Pain is likely neuropathic in origin.  Refer back to Neurology for evaluation.   BLE edema -Due to venous insufficiency.  BLE Venous Reflux Study on 3/30/2021 revealed minimal BLE venous reflux.  Continue graduated compression hose.  Pt to elevate legs when resting.  LImit sodium intake up tp 2 g a day and fluid intake to 1.5 L a day.   PAD- LLE arterial ultrasound on 11/23/2020 revealed BALA of 0.88 on the right indicating mild peripheral artery disease.  Left lower extremity BALA is within normal limits at 0.99.  No hemodynamically significant stenosis demonstrated in the left lower extremity arterial system.  Continue asa 81 mg and start  Atorvastatin 40 mg for medical management of peripheral artery disease.  Obesity- Encourage dietary modification and moderate/aerobic exercise for a minimum of 30 minutes for 5 days a week.     1/18/2022 clinic visit: Mrs. Villasenor reports itching, pain, and swelling of her legs. She states she has been using compression stockings but not consistently because she has difficulty wearing them. She has also noticed arthritis in her wrists and fingers. She also complains of occasional chest pain with dyspnea with the last episode 2 weeks ago.  Plan:   Acute BLE popliteal DVT- unprovoked.  BLE Venous Reflux Study on 3/30/2021 no evidence of lower extremity DVT bilaterally.  Previously noted bilateral popliteal vein DVT (12/15/2020) no longer present.  Continue anticoagulation with apixaban 5 mg bid.    BLE edema -Due to venous insufficiency.  BLE Venous Reflux Study on 3/30/2021 revealed minimal BLE venous reflux.  Prescribe new compression stockings.  Pt to elevate legs when resting.  LImit sodium intake up tp 2 g a day and fluid intake to 1.5 L a day.   Chest pain- last episode about 2 weeks ago with associated dyspnea. Check PET stress and echo to evaluate for ischemic heart disease.  PAD- LLE arterial ultrasound on 11/23/2020 revealed BALA of 0.88 on the right indicating mild peripheral artery disease.  Left lower extremity BALA is within normal limits at 0.99.  No hemodynamically significant stenosis demonstrated in the left lower extremity arterial system.  Continue asa 81 mg and start Atorvastatin 40 mg for medical management of peripheral artery disease. Encourage daily exercise 30 mins per day, 5 days per week.  HTN- uncontrolled. Increase amlodipine to 10mg.  Obesity- Encourage dietary modification and moderate/aerobic exercise for a minimum of 30 minutes for 5 days a week.      clinic visit: Mrs. Villasenor reports pain all over her body, especially at left leg.  States chest pain has fully resolved.  PET Stress Test on  2/9/2022 revealed no clinically significant regional resting or stress induced perfusion abnormalities.  MRI Lumbar Spine 11/12/2021 revealed multilevel degenerative disc disease, most severe at L4-L5 with moderate spinal canal and bilateral neural foraminal narrowing.  Plan:   Acute BLE popliteal DVT- unprovoked.  BLE Venous Reflux Study on 3/30/2021 no evidence of lower extremity DVT bilaterally.  Previously noted bilateral popliteal vein DVT (12/15/2020) no longer present.  Continue anticoagulation with apixaban 5 mg twice a day.  BLE edema -Due to venous insufficiency.  BLE Venous Reflux Study on 3/30/2021 revealed minimal BLE venous reflux.  Prescribe new compression stockings.  Pt to elevate legs when resting.  LImit sodium intake up tp 2 g a day and fluid intake to 1.5 L a day.   Chest pain- Mrs. Villasenor states chest pain has fully resolved.  PET Stress Test on 2/9/2022 revealed no clinically significant regional resting or stress induced perfusion abnormalities.  Continue current medications.   PAD- LLE arterial ultrasound on 11/23/2020 revealed BALA of 0.88 on the right indicating mild peripheral artery disease.  Left lower extremity BALA is within normal limits at 0.99.  No hemodynamically significant stenosis demonstrated in the left lower extremity arterial system.  Continue asa 81 mg and start Atorvastatin 40 mg for medical management of peripheral artery disease. Encourage daily exercise 30 mins per day, 5 days per week.  HTN- uncontrolled. Increase amlodipine to 10mg.  Obesity- Encourage dietary modification and moderate/aerobic exercise for a minimum of 30 minutes for 5 days a week.   Back Pain/Leg Pain- Likely due to severe degenerative disc disease.  MRI Lumbar Spine 11/12/2021 revealed multilevel degenerative disc disease, most severe at L4-L5 with moderate spinal canal and bilateral neural foraminal narrowing.  Refer to Neurosurgery for evaluation.  Check CTA abd/pelvis to evaluated for flow limiting  PAD.    HPI:  Mrs. Villasenor reports feeling the same. She said her left calf is constantly aching. She does not have any rest pain in her feet. She has no new complaints.    Past Medical History:   Diagnosis Date    Abnormality of lung 11/08/2011    Stable bandlike opacities at the lung bases, most likely representing      Anxiety     Arthritis     CAD (coronary artery disease)     Calculus of ureter 2/22/2011    CHF (congestive heart failure)     Chronic back pain 7/29/2012    Colon polyp 9/2010; 11/2010    Colon polyps 7/29/2012    Coronary artery disease involving native coronary artery of native heart with angina pectoris     Depression     Diabetes mellitus     Diabetes mellitus type II     Diverticulitis large intestine 7/27/2015    Diverticulitis of large intestine without perforation or abscess without bleeding     Diverticulosis 09/25/2010; 11/02/2010; 11/08/2011; 7/29/2012    Duodenal disorder 08/25/2011    Duodenal erosion noted on EGD.    Duodenal ulcer, unspecified as acute or chronic, without hemorrhage, perforation, or obstruction 8/24/2011    E. coli sepsis 12/2010    Due to left ureteral stone with left nephrostomy tube - hospitalized in [place]    Esophageal dysmotility 01/24/2012    Noted on upper GI-barium swallow.    Facial weakness 1969    Left facial weakness s/p left mastoidectomy in ~ 1969.    Fatty liver 11/08/2011    Reported on CT-abdomen and in 06/2012 Gastro clinic visit note.    Gastric polyp 09/29/2010    GERD (gastroesophageal reflux disease) 7/29/2012    Hepatomegaly 11/08/2011    Reported on CT-abdomen    Herpes zoster with other nervous system complications(053.19) 2/28/2011    Hiatal hernia 06/26/2006; 09/29/2010; 08/25/2011    Noted on barium swallow 2006; noted on  EGD 2011.   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    Trouble in sleeping     Type II or unspecified type diabetes mellitus with neurological manifestations, not stated as uncontrolled(250.60)     Type II or unspecified type diabetes mellitus with peripheral circulatory disorders, not stated as uncontrolled(250.70)     Type II or unspecified type diabetes mellitus with renal manifestations, not stated as uncontrolled(250.40)        Past Surgical History:   Procedure Laterality Date    BELPHAROPTOSIS REPAIR      s/p LAMBERTO levator repair - Dr. Dejesus    CARDIAC CATHETERIZATION      CARPAL TUNNEL RELEASE  3/13/2012    CATARACT EXTRACTION W/  INTRAOCULAR LENS IMPLANT Right 10/31/2018        CATARACT EXTRACTION W/  INTRAOCULAR LENS IMPLANT Left 03/22/2018        CHOLECYSTECTOMY      COLONOSCOPY N/A 11/16/2016    Procedure: COLONOSCOPY;  Surgeon: Chris Storey MD;  Location: [facility] ([address] [place] [address]);  Service: Endoscopy;  Laterality: N/A;    COLONOSCOPY N/A 6/14/2017    Procedure: COLONOSCOPY;  Surgeon: Chris Storey MD;  [facility]  Service: Endoscopy;  Laterality: N/A;  colonoscopy in 3 months with better bowel prep. - Split PEG prep ordered    ESOPHAGOGASTRODUODENOSCOPY N/A 11/10/2021    Procedure: EGD (ESOPHAGOGASTRODUODENOSCOPY);  Surgeon: Kuldeep Velázquez MD;  Location: [facility] ([address] [place] [address]);   Service: Endoscopy;  Laterality: N/A;  11/4-eliquis hold ok see te -tb-fully vacc-inst mail and verbal-    EXTRACORPOREAL SHOCK WAVE LITHOTRIPSY      INJECTION OF ANESTHETIC AGENT AROUND NERVE Bilateral 6/24/2020    Procedure: BLOCK, NERVE, C4-C5-C6 MEDIAL BRANCH ok per [place] to add on;  Surgeon: Oscar Ravi MD;  Location: [facility] PAIN [facility]GT;  Service: Pain Management;  Laterality: Bilateral;    INTRAOCULAR PROSTHESES INSERTION Right 10/31/2018    Procedure: INSERTION-INTRAOCULAR LENS (IOL);  Surgeon: Keysha Valle MD;  Location: [facility] OR;  Service: Ophthalmology;  Laterality: Right;    JOINT REPLACEMENT      mastoid tumor removal  1969    Left mastoidectomy with residual left facial weakness.    NEPHROSTOMY      OOPHORECTOMY      PHACOEMULSIFICATION OF CATARACT Right 10/31/2018    Procedure: PHACOEMULSIFICATION-ASPIRATION-CATARACT;  Surgeon: Keysha Valle MD;  Location: [facility] OR;  Service: Ophthalmology;  Laterality: Right;    TOTAL ABDOMINAL HYSTERECTOMY  1969    TAHBSO    TOTAL KNEE ARTHROPLASTY  9/13/2012    Left    TRANSFORAMINAL EPIDURAL INJECTION OF STEROID Bilateral 9/17/2019    Procedure: INJECTION, STEROID, EPIDURAL, TRANSFORAMINAL APPROACH;  Surgeon: Oscar Ravi MD;  Location: [facility];  Service: Pain Management;  Laterality: Bilateral;  B/L TFESI L4/L5    TRANSFORAMINAL EPIDURAL INJECTION OF STEROID Bilateral 10/2/2020    Procedure: INJECTION, STEROID, EPIDURAL, TRANSFORAMINAL APPROACH;  Surgeon: Oscar Ravi MD;  Location: [facility];  Service: Pain Management;  Laterality: Bilateral;  B/L TFESI L4/5    TRANSFORAMINAL EPIDURAL INJECTION OF STEROID Bilateral 5/7/2021    Procedure: INJECTION, STEROID, EPIDURAL, TRANSFORAMINAL APPROACH, L4-L5 clear to hold Eliquis 3 days;  Surgeon: Oscar Ravi MD;  Location: [facility];  Service: Pain Management;  Laterality: Bilateral;       Family History   Problem Relation Age of Onset    Sleep apnea Sister     Diabetes Sister      Cancer Mother         brain tumor    Hypertension Mother     Heart disease Father     Heart attack Father     Dementia Brother     Lupus Brother     Frontotemporal dementia Brother     No Known Problems Daughter     No Known Problems Son     Diabetes Sister     Lupus Sister     Breast cancer Sister     Diabetes Sister     Cancer Sister         breast cancer    Heart attack Sister     Diabetes Sister     Liver cancer Brother     Drug abuse Brother     Alcohol abuse Brother     Cirrhosis Brother     Drug abuse Brother     No Known Problems Daughter     No Known Problems Son     No Known Problems Son     No Known Problems Maternal Grandmother     No Known Problems Maternal Grandfather     No Known Problems Paternal Grandmother     No Known Problems Paternal Grandfather     Diabetes Brother     Dementia Brother     Diabetes Other     Ovarian cancer Other         Niece     Breast cancer Maternal Aunt     No Known Problems Maternal Uncle     No Known Problems Paternal Aunt     No Known Problems Paternal Uncle     Glaucoma Neg Hx     Blindness Neg Hx     Celiac disease Neg Hx     Colon cancer Neg Hx     Colon polyps Neg Hx     Esophageal cancer Neg Hx     Inflammatory bowel disease Neg Hx     Liver disease Neg Hx     Rectal cancer Neg Hx     Stomach cancer Neg Hx     Ulcerative colitis Neg Hx     Melanoma Neg Hx     Multiple sclerosis Neg Hx     Psoriasis Neg Hx     Amblyopia Neg Hx     Cataracts Neg Hx     Macular degeneration Neg Hx     Retinal detachment Neg Hx     Strabismus Neg Hx     Stroke Neg Hx     Thyroid disease Neg Hx        Social History     Tobacco Use    Smoking status: Former Smoker     Packs/day: 1.00     Years: 15.00     Pack years: 15.00     Types: Cigarettes     Quit date: 2000     Years since quittin.0    Smokeless tobacco: Never Used   Substance Use Topics    Alcohol use: No       Review of patient's allergies indicates:  "  Allergen Reactions    Crestor [rosuvastatin]      Cramping in legs    Ezetimibe Diarrhea     Other reaction(s): abdominal pain, Diarrhea      Hydrocodone Itching    Lisinopril      Other reaction(s): cough      Sulfa (sulfonamide antibiotics) Itching and Rash           Sulfamethoxazole     Sulfamethoxazole-trimethoprim     Valsartan Swelling       Current Outpatient Medications on File Prior to Visit   Medication Sig Dispense Refill    ACCU-CHEK GUIDE TEST STRIPS Strp USE TO CHECK BLOOD SUGAR THREE TIMES DAILY 150 strip 4    ACCU-CHEK SMARTVIEW TEST STRIP Strp TEST THREE TIMES DAILY 300 strip 3    albuterol (VENTOLIN HFA) 90 mcg/actuation inhaler Inhale 2 puffs by mouth into the lungs every 4 (four) hours as needed for Wheezing. Rescue 18 g 1    allopurinoL (ZYLOPRIM) 100 MG tablet Take 1 tablet (100 mg total) by mouth once daily. 90 tablet 3    amLODIPine (NORVASC) 5 MG tablet Take 1 tablet (5 mg total) by mouth once daily. 90 tablet 3    aspirin (ECOTRIN) 81 MG EC tablet Take 1 tablet by mouth Daily.      atorvastatin (LIPITOR) 40 MG tablet TAKE 1 TABLET EVERY DAY 90 tablet 3    azelastine (ASTELIN) 137 mcg (0.1 %) nasal spray Spray 1 spray (137 mcg total) by Nasal route 2 (two) times daily. 30 mL 3    BD ALCOHOL SWABS PadM USE  WHEN  TESTING BLOOD SUGAR FOUR TIMES DAILY 400 each 3    BD ULTRA-FINE OLU PEN NEEDLES 32 x 5/32 " Ndle Uses 4 times daily, on multiple daily insulin injections 130 each 12    blood-glucose meter kit Use as instructed 1 each 0    carbamide peroxide (EAR WAX REMOVAL DROPS OTIC)       carvediloL (COREG) 25 MG tablet Take 1 tablet (25 mg total) by mouth 2 (two) times daily. 180 tablet 3    cholecalciferol, vitamin D3, (DIALYVITE VITAMIN D) 5,000 unit capsule Take 5,000 Units by mouth once daily.      DULoxetine (CYMBALTA) 60 MG capsule Take 1 capsule (60 mg total) by mouth 2 (two) times daily. 180 capsule 3    fluticasone (FLONASE) 50 mcg/actuation nasal spray SHAKE " "LQ AND U 2 SPRAYS IEN QD 1 Bottle 6    furosemide (LASIX) 40 MG tablet TAKE 1 TABLET BY MOUTH TWICE DAILY 180 tablet 4    insulin (LANTUS SOLOSTAR U-100 INSULIN) glargine 100 units/mL (3mL) SubQ pen Inject 30 Units into the skin 2 (two) times a day. 60 mL 1    insulin aspart U-100 (NOVOLOG) 100 unit/mL (3 mL) InPn pen Inject 24 Units into the skin before breakfast AND 28 Units daily with lunch AND 28 Units before dinner. 120 mL 3    lancets (ACCU-CHEK FASTCLIX LANCING DEV) Misc Lancets and device = check blood sugar 4 times daily 400 each 4    linaGLIPtin (TRADJENTA) 5 mg Tab tablet Take 1 tablet (5 mg total) by mouth once daily. 90 tablet 3    loratadine (CLARITIN) 10 mg tablet Take 1 tablet (10 mg total) by mouth once daily. 30 tablet 4    meclizine (ANTIVERT) 12.5 mg tablet Take 1 tablet (12.5 mg total) by mouth 3 (three) times daily as needed for Dizziness. 30 tablet 0    olopatadine (PATADAY) 0.2 % Drop Place 1 drop into both eyes once daily. 2.5 mL 12    [START ON 12/10/2020] oxyCODONE-acetaminophen (PERCOCET)  mg per tablet Take 1 tablet by mouth 5 (five) times daily. 150 tablet 0    pantoprazole (PROTONIX) 40 MG tablet TAKE 1 TABLET(40 MG) BY MOUTH twice daily 180 tablet 4    pen needle, diabetic (SURE-FINE PEN NEEDLES) 29 gauge x 1/2" Ndle Use 4 times daily 400 each 4    rOPINIRole (REQUIP) 0.5 MG tablet TAKE 1 TABLET (0.5 MG TOTAL) BY MOUTH EVERY EVENING. 90 tablet 1    TRUEPLUS LANCETS 33 gauge Misc TEST BLOOD SUGAR FOUR TIMES DAILY 400 each 4     Review of Systems   Constitution: Negative for chills, fever and malaise/fatigue.   HENT: Negative for congestion and hoarse voice.    Eyes: Negative for visual disturbance.   Cardiovascular: Positive for left knee swelling. Negative for dyspnea on exertion, orthopnea and paroxysmal nocturnal dyspnea.   Respiratory: Negative for cough, shortness of breath and sputum production.    Endocrine: Negative for cold intolerance, heat intolerance, " polydipsia and polyuria.   Hematologic/Lymphatic: Negative for adenopathy and bleeding problem. Does not bruise/bleed easily.   Skin: Negative for color change, flushing and itching.   Musculoskeletal: Positive for leg pain and tenderness to palpation.  Left arm swelling near elbow. Negative for back pain, joint pain, joint swelling and muscle cramps.   Gastrointestinal: Negative for abdominal pain, constipation, diarrhea and nausea.   Genitourinary: Negative for pelvic pain.   Neurological: Negative for focal weakness, light-headedness, loss of balance, paresthesias, seizures and weakness    Objective   LMP  (LMP Unknown)     Physical Exam   Constitutional: No acute distress, conversant  HEENT: Sclera anicteric, Pupils equal, round and reactive to light, extraocular motions intact, Oropharynx clear  Neck: No JVD, no carotid bruits  Cardiovascular: regular rate and rhythm, no murmur, rubs or gallops, normal S1/S2  Pulmonary: Clear to auscultation bilaterally  Abdominal: Abdomen soft, nontender, nondistended, positive bowel sounds  Extremities: BLE's with prominent varicose veins and TTP. No significant edema. Left lateral knee pain tender to palpation, no knee effusion, or warmth.   Skin: No ecchymosis, erythema, or ulcers. Feet are warm.  Psych: Alert and oriented x 3, appropriate affect  Neuro: CNII-XII intact, no focal deficits    CMP  Sodium   Date Value Ref Range Status   07/07/2022 140 136 - 145 mmol/L Final     Potassium   Date Value Ref Range Status   07/07/2022 4.2 3.5 - 5.1 mmol/L Final     Chloride   Date Value Ref Range Status   07/07/2022 102 95 - 110 mmol/L Final     CO2   Date Value Ref Range Status   07/07/2022 30 (H) 23 - 29 mmol/L Final     Glucose   Date Value Ref Range Status   07/07/2022 181 (H) 70 - 110 mg/dL Final     BUN   Date Value Ref Range Status   07/07/2022 14 8 - 23 mg/dL Final     Creatinine   Date Value Ref Range Status   07/07/2022 1.2 0.5 - 1.4 mg/dL Final     Calcium   Date Value  Ref Range Status   07/07/2022 9.8 8.7 - 10.5 mg/dL Final     Total Protein   Date Value Ref Range Status   03/17/2022 7.3 6.0 - 8.4 g/dL Final     Albumin   Date Value Ref Range Status   03/17/2022 3.2 (L) 3.5 - 5.2 g/dL Final     Total Bilirubin   Date Value Ref Range Status   03/17/2022 0.5 0.1 - 1.0 mg/dL Final     Comment:     For infants and newborns, interpretation of results should be based  on gestational age, weight and in agreement with clinical  observations.    Premature Infant recommended reference ranges:  Up to 24 hours.............<8.0 mg/dL  Up to 48 hours............<12.0 mg/dL  3-5 days..................<15.0 mg/dL  6-29 days.................<15.0 mg/dL       Alkaline Phosphatase   Date Value Ref Range Status   03/17/2022 84 55 - 135 U/L Final     AST   Date Value Ref Range Status   03/17/2022 18 10 - 40 U/L Final     ALT   Date Value Ref Range Status   03/17/2022 12 10 - 44 U/L Final     Anion Gap   Date Value Ref Range Status   07/07/2022 8 8 - 16 mmol/L Final     eGFR if    Date Value Ref Range Status   07/07/2022 48.6 (A) >60 mL/min/1.73 m^2 Final     eGFR if non    Date Value Ref Range Status   07/07/2022 42.2 (A) >60 mL/min/1.73 m^2 Final     Comment:     Calculation used to obtain the estimated glomerular filtration  rate (eGFR) is the CKD-EPI equation.          Lab Results   Component Value Date    WBC 9.99 03/17/2022    HGB 11.4 (L) 03/17/2022    HCT 36.9 (L) 03/17/2022    MCV 91 03/17/2022     03/17/2022       Imaging/Diagnostics    CTA abd/pelvis w/runoff 7/15/2022:  Right:  Common iliac artery: Scattered calcified plaque with multifocal areas of less than 50% stenosis  External iliac artery: Extensive calcified plaque with multifocal areas of 50-70% stenosis.  Common femoral artery: Scattered calcified plaque with multifocal areas of 50-70% stenosis.  Femoral artery: Scattered calcified plaque with multifocal stenoses including area of 70-95%  stenosis in the mid thigh (axial series 5, image 1044).  Popliteal artery: Scattered calcified plaque with areas of 70-95% stenosis (axial series 5, image 1316, 1344 and 1478).  Anterior tibial artery: Multifocal calcified plaque which limits assessment although appears occluded in the mid calf with reconstitution at the ankle/level of the dorsalis pedis artery (axial series 5, image 2799).  Posterior tibial artery: Multifocal calcified plaque which limits assessment.  Multifocal areas of occlusion.  Peroneal artery: Scattered calcified plaque.  Patent through the ankle, dominant supply to the foot.  Left:  Common iliac artery: Scattered calcified plaque with multifocal areas of 60-80% stenosis.  External iliac artery: Scattered calcified plaque with multifocal areas of less than 50% stenosis.  Common femoral artery: Scattered calcified plaque with multifocal areas of 50-70% stenosis.  Femoral artery: Scattered calcified plaque with multifocal areas of 60-80% stenosis  Popliteal artery: Streak artifact from the hardware limits assessment.  Scattered calcified plaque with multifocal areas of 70-95% stenosis (axial series 5, image 1285 and 1317).  Probable occlusion in the proximal popliteal.  Anterior tibial artery: Multifocal calcified plaque which limits assessment although appears occluded at the level of the ankle.  Posterior tibial artery: Scattered calcified plaque.  Patent through the ankle.  Peroneal artery: Multifocal calcified plaque which limits assessment.  Patent through the ankle.    MRI Lumbar Spine 11/12/2021:  Multilevel degenerative disc disease, most severe at L4-L5 with moderate spinal canal and bilateral neural foraminal narrowing.    PET Stress Test 2/9/2022:    The relative PET images show no clinically significant regional resting or stress induced perfusion abnormalities.    The whole heart absolute myocardial perfusion values averaged 1.33 cc/min/g at rest, which is elevated; 1.79 cc/min/g  at stress, which is mildly reduced; and CFR is 1.35 , which is moderately reduced in part due to elevated resting flow.    CT attenuation images demonstrate moderate diffuse coronary calcifications in the LAD and LCX territory.    The gated perfusion images showed an ejection fraction of 42% at rest and 56% during stress. A normal ejection fraction is greater than 53%.    The wall motion is normal at rest and during stress.    The LV cavity size is normal at rest and stress.    The EKG portion of this study is negative for ischemia.    During stress, occasional PVCs are noted.    The patient reported no chest pain during the stress test.    BLE Venous Reflux Study 3/30/2021:  No evidence of lower extremity DVT bilaterally.  Previously noted bilateral popliteal vein DVT (see report dated 12/15/2020) no longer present.  Minimal right common femoral vein reflux (deep venous insufficiency).  Minimal right GSV reflux in above knee segment.  Minimal left GSV reflux in below knee segment.    MRI Left Forearm 1/6/2021   Small fluid collection within the subcutaneous soft tissues overlying the posterolateral aspect of the elbow that demonstrates imaging characteristics favoring a hematoma and correlating with the reported history of recent trauma.  Consider follow-up ultrasound in 3 months to ensure resolution.    BLE Venous US (12/15/20)  Bilateral popliteal vein DVT.  Minimal reflux in right GSV in below-knee segment    XR Hip (11/23/20)  FINDINGS:  Femoral heads appear normally positioned.  No evidence of acute fracture, subluxation or dislocation.  Mild-moderate joint space narrowing of the hips bilaterally.  Mild degenerative osteophytic spurring of the acetabulum bilaterally   SI joints appear intact.  Pubic rami are intact.  Vascular atherosclerosis.  Impression:  No acute radiographic abnormality.     LLE arterial ultrasound (11/23/20)  Impression:  BALA of 0.88 on the right indicating mild peripheral artery  disease.  Left lower extremity BALA is within normal limits at 0.99.   No hemodynamically significant stenosis demonstrated in the left lower extremity arterial system.    XR Knees (11/23/20)  Impression:  Remote left TKA without evidence of acute malalignment, loosening or displaced fracture-dislocation.    Assessment and Plan:  Henrietta Villasenor is a 82-year-old female with non obstructive CAD, PAD, venous insufficiency, IDDM2, hypertension, morbid obesity, peripheral neuropathy, osteoarthritis s/p LTKA, who presents for a follow up appointment.    1. Acute BLE popliteal DVT- unprovoked.  BLE Venous Reflux Study on 3/30/2021 no evidence of lower extremity DVT bilaterally.  Previously noted bilateral popliteal vein DVT (12/15/2020) no longer present.  Continue anticoagulation with apixaban 5 mg twice a day.    2. BLE edema -Due to venous insufficiency.  BLE Venous Reflux Study on 3/30/2021 revealed minimal BLE venous reflux. Continue to us compression stockings.  Pt to elevate legs when resting.  LImit sodium intake up tp 2 g a day and fluid intake to 1.5 L a day.        3. Chest pain- Mrs. Villasenor states chest pain has fully resolved.  PET Stress Test on 2/9/2022 revealed no clinically significant regional resting or stress induced perfusion abnormalities.  Continue current medications.     4. PAD- No claudication, rest pain, or tissue loss. CTA with results as mentioned above. Start cilostazol 100mg BID. Continue asa 81 mg and start Atorvastatin 40 mg for medical management of peripheral artery disease. Encourage daily exercise 30 mins per day, 5 days per week.    5. HTN- Controlled. Continue current meds.    6. Obesity- Encourage dietary modification and moderate/aerobic exercise for a minimum of 30 minutes for 5 days a week.     7. Back Pain/Leg Pain- Likely due to severe degenerative disc disease.  MRI Lumbar Spine 11/12/2021 revealed multilevel degenerative disc disease, most severe at L4-L5 with moderate spinal  canal and bilateral neural foraminal narrowing.  Referred to Neurosurgery for evaluation, they recommended surgery to improve her symptoms however she is not interested in surgery at this time. Refer to pain medicine.    Follow up in 4 months    Total duration of face to face visit time 30 minutes.  Total time spent counseling greater than fifty percent of total visit time.  Counseling included discussion regarding imaging findings, diagnosis, possibilities, treatment options, risks and benefits.  The patient had many questions regarding the options and long-term effects.    Sanford Mello MD  Vascular Medicine Fellow    Patient seen and discussed with Dr. Reilly

## 2022-08-16 ENCOUNTER — HOSPITAL ENCOUNTER (EMERGENCY)
Facility: HOSPITAL | Age: 82
Discharge: HOME | End: 2022-08-17
Attending: STUDENT IN AN ORGANIZED HEALTH CARE EDUCATION/TRAINING PROGRAM
Payer: MEDICARE

## 2022-08-16 ENCOUNTER — TELEPHONE (OUTPATIENT)
Dept: INTERNAL MEDICINE | Facility: CLINIC | Age: 82
End: 2022-08-16

## 2022-08-16 ENCOUNTER — OFFICE VISIT (OUTPATIENT)
Dept: INTERNAL MEDICINE | Facility: CLINIC | Age: 82
End: 2022-08-16
Payer: MEDICARE

## 2022-08-16 VITALS
DIASTOLIC BLOOD PRESSURE: 62 MMHG | WEIGHT: 227.5 LBS | BODY MASS INDEX: 34.48 KG/M2 | SYSTOLIC BLOOD PRESSURE: 142 MMHG | HEIGHT: 68 IN | OXYGEN SATURATION: 98 % | HEART RATE: 102 BPM

## 2022-08-16 DIAGNOSIS — R52 COMPLAINTS OF TOTAL BODY PAIN: ICD-10-CM

## 2022-08-16 DIAGNOSIS — R06.02 SHORTNESS OF BREATH: ICD-10-CM

## 2022-08-16 DIAGNOSIS — G44.52 NEW DAILY PERSISTENT HEADACHE: Primary | ICD-10-CM

## 2022-08-16 DIAGNOSIS — M79.10 MYALGIA: ICD-10-CM

## 2022-08-16 DIAGNOSIS — G43.009 ATYPICAL MIGRAINE: Primary | ICD-10-CM

## 2022-08-16 LAB
ALBUMIN SERPL BCP-MCNC: 3.2 G/DL (ref 3.5–5.2)
ALP SERPL-CCNC: 88 U/L (ref 55–135)
ALT SERPL W/O P-5'-P-CCNC: 10 U/L (ref 10–44)
ANION GAP SERPL CALC-SCNC: 9 MMOL/L (ref 8–16)
AST SERPL-CCNC: 17 U/L (ref 10–40)
BASOPHILS # BLD AUTO: 0.07 K/UL (ref 0–0.2)
BASOPHILS NFR BLD: 0.6 % (ref 0–1.9)
BILIRUB SERPL-MCNC: 0.4 MG/DL (ref 0.1–1)
BUN SERPL-MCNC: 11 MG/DL (ref 8–23)
CALCIUM SERPL-MCNC: 9.8 MG/DL (ref 8.7–10.5)
CHLORIDE SERPL-SCNC: 107 MMOL/L (ref 95–110)
CO2 SERPL-SCNC: 26 MMOL/L (ref 23–29)
CREAT SERPL-MCNC: 1 MG/DL (ref 0.5–1.4)
CTP QC/QA: YES
DIFFERENTIAL METHOD: ABNORMAL
EOSINOPHIL # BLD AUTO: 0.2 K/UL (ref 0–0.5)
EOSINOPHIL NFR BLD: 1.7 % (ref 0–8)
ERYTHROCYTE [DISTWIDTH] IN BLOOD BY AUTOMATED COUNT: 14.4 % (ref 11.5–14.5)
EST. GFR  (NO RACE VARIABLE): 56.2 ML/MIN/1.73 M^2
GLUCOSE SERPL-MCNC: 214 MG/DL (ref 70–110)
HCT VFR BLD AUTO: 33.5 % (ref 37–48.5)
HGB BLD-MCNC: 10.9 G/DL (ref 12–16)
IMM GRANULOCYTES # BLD AUTO: 0.07 K/UL (ref 0–0.04)
IMM GRANULOCYTES NFR BLD AUTO: 0.6 % (ref 0–0.5)
INR PPP: 1.1 (ref 0.8–1.2)
LYMPHOCYTES # BLD AUTO: 2.7 K/UL (ref 1–4.8)
LYMPHOCYTES NFR BLD: 23.1 % (ref 18–48)
MAGNESIUM SERPL-MCNC: 1.6 MG/DL (ref 1.6–2.6)
MCH RBC QN AUTO: 28.3 PG (ref 27–31)
MCHC RBC AUTO-ENTMCNC: 32.5 G/DL (ref 32–36)
MCV RBC AUTO: 87 FL (ref 82–98)
MONOCYTES # BLD AUTO: 1.1 K/UL (ref 0.3–1)
MONOCYTES NFR BLD: 9.3 % (ref 4–15)
NEUTROPHILS # BLD AUTO: 7.7 K/UL (ref 1.8–7.7)
NEUTROPHILS NFR BLD: 64.7 % (ref 38–73)
NRBC BLD-RTO: 0 /100 WBC
PLATELET # BLD AUTO: 291 K/UL (ref 150–450)
PMV BLD AUTO: 9.5 FL (ref 9.2–12.9)
POTASSIUM SERPL-SCNC: 3.8 MMOL/L (ref 3.5–5.1)
PROT SERPL-MCNC: 7.4 G/DL (ref 6–8.4)
PROTHROMBIN TIME: 11.3 SEC (ref 9–12.5)
RBC # BLD AUTO: 3.85 M/UL (ref 4–5.4)
SARS-COV-2 RDRP RESP QL NAA+PROBE: NEGATIVE
SODIUM SERPL-SCNC: 142 MMOL/L (ref 136–145)
TROPONIN I SERPL DL<=0.01 NG/ML-MCNC: 0.01 NG/ML (ref 0–0.03)
WBC # BLD AUTO: 11.84 K/UL (ref 3.9–12.7)

## 2022-08-16 PROCEDURE — 99284 EMERGENCY DEPT VISIT MOD MDM: CPT | Mod: ,,, | Performed by: STUDENT IN AN ORGANIZED HEALTH CARE EDUCATION/TRAINING PROGRAM

## 2022-08-16 PROCEDURE — 84484 ASSAY OF TROPONIN QUANT: CPT | Performed by: STUDENT IN AN ORGANIZED HEALTH CARE EDUCATION/TRAINING PROGRAM

## 2022-08-16 PROCEDURE — 3077F SYST BP >= 140 MM HG: CPT | Mod: CPTII,S$GLB,, | Performed by: INTERNAL MEDICINE

## 2022-08-16 PROCEDURE — 96361 HYDRATE IV INFUSION ADD-ON: CPT | Mod: 59

## 2022-08-16 PROCEDURE — 99999 PR PBB SHADOW E&M-EST. PATIENT-LVL III: CPT | Mod: PBBFAC,,, | Performed by: INTERNAL MEDICINE

## 2022-08-16 PROCEDURE — 1160F PR REVIEW ALL MEDS BY PRESCRIBER/CLIN PHARMACIST DOCUMENTED: ICD-10-PCS | Mod: CPTII,S$GLB,, | Performed by: INTERNAL MEDICINE

## 2022-08-16 PROCEDURE — 85025 COMPLETE CBC W/AUTO DIFF WBC: CPT | Performed by: STUDENT IN AN ORGANIZED HEALTH CARE EDUCATION/TRAINING PROGRAM

## 2022-08-16 PROCEDURE — 93010 EKG 12-LEAD: ICD-10-PCS | Mod: ,,, | Performed by: INTERNAL MEDICINE

## 2022-08-16 PROCEDURE — 1159F MED LIST DOCD IN RCRD: CPT | Mod: CPTII,S$GLB,, | Performed by: INTERNAL MEDICINE

## 2022-08-16 PROCEDURE — 3078F PR MOST RECENT DIASTOLIC BLOOD PRESSURE < 80 MM HG: ICD-10-PCS | Mod: CPTII,S$GLB,, | Performed by: INTERNAL MEDICINE

## 2022-08-16 PROCEDURE — 99213 PR OFFICE/OUTPT VISIT, EST, LEVL III, 20-29 MIN: ICD-10-PCS | Mod: S$GLB,,, | Performed by: INTERNAL MEDICINE

## 2022-08-16 PROCEDURE — 99284 PR EMERGENCY DEPT VISIT,LEVEL IV: ICD-10-PCS | Mod: ,,, | Performed by: STUDENT IN AN ORGANIZED HEALTH CARE EDUCATION/TRAINING PROGRAM

## 2022-08-16 PROCEDURE — 99213 OFFICE O/P EST LOW 20 MIN: CPT | Mod: S$GLB,,, | Performed by: INTERNAL MEDICINE

## 2022-08-16 PROCEDURE — 3288F FALL RISK ASSESSMENT DOCD: CPT | Mod: CPTII,S$GLB,, | Performed by: INTERNAL MEDICINE

## 2022-08-16 PROCEDURE — 93005 ELECTROCARDIOGRAM TRACING: CPT

## 2022-08-16 PROCEDURE — 3077F PR MOST RECENT SYSTOLIC BLOOD PRESSURE >= 140 MM HG: ICD-10-PCS | Mod: CPTII,S$GLB,, | Performed by: INTERNAL MEDICINE

## 2022-08-16 PROCEDURE — 1101F PR PT FALLS ASSESS DOC 0-1 FALLS W/OUT INJ PAST YR: ICD-10-PCS | Mod: CPTII,S$GLB,, | Performed by: INTERNAL MEDICINE

## 2022-08-16 PROCEDURE — 1125F PR PAIN SEVERITY QUANTIFIED, PAIN PRESENT: ICD-10-PCS | Mod: CPTII,S$GLB,, | Performed by: INTERNAL MEDICINE

## 2022-08-16 PROCEDURE — U0002 COVID-19 LAB TEST NON-CDC: HCPCS | Mod: QW,S$GLB,, | Performed by: INTERNAL MEDICINE

## 2022-08-16 PROCEDURE — 3078F DIAST BP <80 MM HG: CPT | Mod: CPTII,S$GLB,, | Performed by: INTERNAL MEDICINE

## 2022-08-16 PROCEDURE — 99285 EMERGENCY DEPT VISIT HI MDM: CPT | Mod: 25

## 2022-08-16 PROCEDURE — 96374 THER/PROPH/DIAG INJ IV PUSH: CPT

## 2022-08-16 PROCEDURE — 80053 COMPREHEN METABOLIC PANEL: CPT | Performed by: STUDENT IN AN ORGANIZED HEALTH CARE EDUCATION/TRAINING PROGRAM

## 2022-08-16 PROCEDURE — 85610 PROTHROMBIN TIME: CPT | Performed by: STUDENT IN AN ORGANIZED HEALTH CARE EDUCATION/TRAINING PROGRAM

## 2022-08-16 PROCEDURE — U0002: ICD-10-PCS | Mod: QW,S$GLB,, | Performed by: INTERNAL MEDICINE

## 2022-08-16 PROCEDURE — 83735 ASSAY OF MAGNESIUM: CPT | Performed by: STUDENT IN AN ORGANIZED HEALTH CARE EDUCATION/TRAINING PROGRAM

## 2022-08-16 PROCEDURE — 1160F RVW MEDS BY RX/DR IN RCRD: CPT | Mod: CPTII,S$GLB,, | Performed by: INTERNAL MEDICINE

## 2022-08-16 PROCEDURE — 1101F PT FALLS ASSESS-DOCD LE1/YR: CPT | Mod: CPTII,S$GLB,, | Performed by: INTERNAL MEDICINE

## 2022-08-16 PROCEDURE — 3288F PR FALLS RISK ASSESSMENT DOCUMENTED: ICD-10-PCS | Mod: CPTII,S$GLB,, | Performed by: INTERNAL MEDICINE

## 2022-08-16 PROCEDURE — 99999 PR PBB SHADOW E&M-EST. PATIENT-LVL III: ICD-10-PCS | Mod: PBBFAC,,, | Performed by: INTERNAL MEDICINE

## 2022-08-16 PROCEDURE — 1125F AMNT PAIN NOTED PAIN PRSNT: CPT | Mod: CPTII,S$GLB,, | Performed by: INTERNAL MEDICINE

## 2022-08-16 PROCEDURE — 93010 ELECTROCARDIOGRAM REPORT: CPT | Mod: ,,, | Performed by: INTERNAL MEDICINE

## 2022-08-16 PROCEDURE — 1159F PR MEDICATION LIST DOCUMENTED IN MEDICAL RECORD: ICD-10-PCS | Mod: CPTII,S$GLB,, | Performed by: INTERNAL MEDICINE

## 2022-08-16 PROCEDURE — 25000003 PHARM REV CODE 250: Performed by: STUDENT IN AN ORGANIZED HEALTH CARE EDUCATION/TRAINING PROGRAM

## 2022-08-16 RX ORDER — METHOCARBAMOL 500 MG/1
1000 TABLET, FILM COATED ORAL
Status: COMPLETED | OUTPATIENT
Start: 2022-08-16 | End: 2022-08-16

## 2022-08-16 RX ORDER — BUTALBITAL, ACETAMINOPHEN AND CAFFEINE 50; 325; 40 MG/1; MG/1; MG/1
1 TABLET ORAL
Status: COMPLETED | OUTPATIENT
Start: 2022-08-16 | End: 2022-08-16

## 2022-08-16 RX ADMIN — METHOCARBAMOL 1000 MG: 500 TABLET ORAL at 11:08

## 2022-08-16 RX ADMIN — BUTALBITAL, ACETAMINOPHEN, AND CAFFEINE 1 TABLET: 50; 325; 40 TABLET ORAL at 11:08

## 2022-08-16 NOTE — PROGRESS NOTES
1833  collected specimen for Covid test.    1841 MD request called for ambulance transport to ED for pt w/ SOB , HA and generalized pain. Pt has a lengthy Neuro hx.     1843 called for Security.     1844 called for pt's niece and LM for her to call us.    1900 Amb unit [id number] arrived     1905 Ambulance left w/ pt and pt's Covid test is Negative.      1918 Notified pt's niece that she is enroute to [facility] and Ms Hester stated she will tell pt's grandson (she lives with him).

## 2022-08-16 NOTE — PROGRESS NOTES
Subjective:       Patient ID: Henrietta Villasenor is a 82 y.o. female.    Chief Complaint: body pain          Henrietta Villasenor is 82 y.o. female who presents new onset headache X 2 weeks.  It is unclear whether headache has been persistent of intermittent.  Pt also c/o left sided body pain from her head to her toe that she states is new.  Pt denies new onset weakness or numbness.  Pt reports generalized weakness and increased fatigue.  Pt also reports increase shortness of breath.      Review of Systems   Constitutional: Negative for chills and fever.   HENT: Negative for sinus pressure/congestion.    Respiratory: Positive for shortness of breath. Negative for cough.    Gastrointestinal: Positive for diarrhea (secondary to medication). Negative for abdominal pain, constipation, nausea and vomiting.   Neurological: Positive for weakness and headaches. Negative for numbness.         Objective:      Physical Exam  Vitals reviewed.   Constitutional:       General: She is awake.      Appearance: Normal appearance.   HENT:      Head: Normocephalic and atraumatic.      Mouth/Throat:      Mouth: Mucous membranes are moist.      Pharynx: Oropharynx is clear.   Eyes:      Extraocular Movements: Extraocular movements intact.      Conjunctiva/sclera: Conjunctivae normal.      Pupils: Pupils are equal, round, and reactive to light.   Cardiovascular:      Rate and Rhythm: Normal rate and regular rhythm.      Heart sounds: No murmur heard.    No friction rub. No gallop.   Pulmonary:      Effort: Pulmonary effort is normal.      Breath sounds: Normal breath sounds.      Comments: Pt getting short of breath answering questions.    Abdominal:      General: Bowel sounds are normal. There is no distension.      Tenderness: There is no abdominal tenderness. There is no guarding or rebound.   Musculoskeletal:      Right lower leg: No edema.      Left lower leg: No edema.   Lymphadenopathy:      Cervical: No cervical adenopathy.   Neurological:       Mental Status: She is alert and oriented to person, place, and time.      Comments: Left facial droop, no UE weakness, pt walks with a walker     Psychiatric:         Mood and Affect: Mood normal.         Behavior: Behavior is cooperative.         Assessment:       1. New daily persistent headache    2. Shortness of breath    3. Myalgia        Plan:     83 y/o AA female with new onset headache and left sided body pain.  On exam, pt has left sided facial droop that she states is not new but unsure if it is worsened.  Pt also with worsening shortness of breath and getting SOB with answering questions in clinic.  Given pt's age, new onset HA and difficulty talking secondary to SOB, pt was transferred to the ED via EMS for further evaluation and treatment.        Henrietta was seen today for body pain.    Diagnoses and all orders for this visit:    New daily persistent headache  -     POCT COVID-19 Rapid Screening    Shortness of breath    Myalgia

## 2022-08-17 ENCOUNTER — TELEPHONE (OUTPATIENT)
Dept: INTERNAL MEDICINE | Facility: CLINIC | Age: 82
End: 2022-08-17
Payer: MEDICARE

## 2022-08-17 VITALS
DIASTOLIC BLOOD PRESSURE: 74 MMHG | TEMPERATURE: 99 F | HEART RATE: 71 BPM | OXYGEN SATURATION: 98 % | RESPIRATION RATE: 18 BRPM | SYSTOLIC BLOOD PRESSURE: 152 MMHG

## 2022-08-17 PROCEDURE — 25000003 PHARM REV CODE 250: Performed by: EMERGENCY MEDICINE

## 2022-08-17 PROCEDURE — 63600175 PHARM REV CODE 636 W HCPCS: Performed by: EMERGENCY MEDICINE

## 2022-08-17 RX ORDER — METOCLOPRAMIDE HYDROCHLORIDE 5 MG/ML
10 INJECTION INTRAMUSCULAR; INTRAVENOUS
Status: COMPLETED | OUTPATIENT
Start: 2022-08-17 | End: 2022-08-17

## 2022-08-17 RX ADMIN — METOCLOPRAMIDE 10 MG: 5 INJECTION, SOLUTION INTRAMUSCULAR; INTRAVENOUS at 12:08

## 2022-08-17 RX ADMIN — SODIUM CHLORIDE 250 ML: 0.9 INJECTION, SOLUTION INTRAVENOUS at 12:08

## 2022-08-17 NOTE — TELEPHONE ENCOUNTER
Called to check on pt today and she didn't answer.  She was dc'd from ED @ 0140 this morning. Pt's niece stated she did hear from her, asked if she had any updates  Or if she needs anything to let us know.

## 2022-08-17 NOTE — ED PROVIDER NOTES
Encounter Date: 8/16/2022       History     Chief Complaint   Patient presents with    Headache     Pt c/o HA on left side of head and sharp pain down left side of body that began today. Left sided weakness and left sided facial droop present, but per pt, this pt's baseline r/t previous tumor. Pt's only complaint is HA ans left sided pain.      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 She has chronic migraines, but this headache has worsened and feels different from her migraines.  The head pain begins midline and towards the left side of her head, is intermittent, sharp, and radiated to her neck. She describes the headache as severe. Nothing seems to exacerbate or alleviate the pain.   She also reports pain in her left arm and shoulder, left leg, and left ankle that has been worsening over the past 1 week after she fell on her left side where she braced herself with her left hand with no LOC or head trauma but reports a sprained left ankle.     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 She is able to ambulate and states that she is weaker on the left side than the right which seems to be worse than her baseline. MRI was negative for acute stroke          Review of patient's allergies indicates:   Allergen Reactions    Crestor [rosuvastatin]      Cramping in legs    Ezetimibe Diarrhea     Other reaction(s): abdominal pain, Diarrhea      Hydrocodone Itching    Lisinopril      Other reaction(s): cough      Sulfa (sulfonamide antibiotics) Itching and Rash           Sulfamethoxazole     Sulfamethoxazole-trimethoprim     Valsartan Swelling     Past Medical History:   Diagnosis Date     Abnormality of lung 11/08/2011    Stable bandlike opacities at the lung bases, most likely representing      Anxiety     Arthritis     CAD (coronary artery disease)     Calculus of ureter 2/22/2011    CHF (congestive heart failure)     Chronic back pain 7/29/2012    Colon polyp 9/2010; 11/2010    Colon polyps 7/29/2012    Coronary artery disease involving native coronary artery of native heart with angina pectoris     Depression     Diabetes mellitus     Diabetes mellitus type II     Diverticulitis large intestine 7/27/2015    Diverticulitis of large intestine without perforation or abscess without bleeding     Diverticulosis 09/25/2010; 11/02/2010; 11/08/2011; 7/29/2012    Duodenal disorder 08/25/2011    Duodenal erosion noted on EGD.    Duodenal ulcer, unspecified as acute or chronic, without hemorrhage, perforation, or obstruction 8/24/2011    E. coli sepsis 12/2010    Due to left ureteral stone with left nephrostomy tube - hospitalized in [place]    Esophageal dysmotility 01/24/2012    Noted on upper GI-barium swallow.    Facial weakness 1969    Left facial weakness s/p left mastoidectomy in ~ 1969.    Fatty liver 11/08/2011    Reported on CT-abdomen and in 06/2012 Gastro clinic visit note.    Gastric polyp 09/29/2010    GERD (gastroesophageal reflux disease) 7/29/2012    Hepatomegaly 11/08/2011    Reported on CT-abdomen    Herpes zoster with other nervous system complications(053.19) 2/28/2011    Hiatal hernia 06/26/2006; 09/29/2010; 08/25/2011    Noted on barium swallow 2006; noted on  EGD 2011.   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    Trouble in sleeping     Type II or unspecified type diabetes mellitus with neurological manifestations, not stated as uncontrolled(250.60)     Type II or unspecified type diabetes mellitus with peripheral circulatory disorders, not stated as uncontrolled(250.70)     Type II or unspecified type diabetes mellitus with renal manifestations, not stated as uncontrolled(250.40)      Past Surgical History:   Procedure Laterality Date    BELPHAROPTOSIS REPAIR      s/p LAMBERTO levator repair - Dr. Dejesus    CARDIAC CATHETERIZATION      CARPAL TUNNEL RELEASE  3/13/2012    CATARACT EXTRACTION W/  INTRAOCULAR LENS IMPLANT Right 10/31/2018        CATARACT EXTRACTION W/  INTRAOCULAR LENS IMPLANT Left 03/22/2018        CHOLECYSTECTOMY      COLONOSCOPY N/A 11/16/2016    Procedure: COLONOSCOPY;  Surgeon: Chris Storey MD;  Location: [address]);  Service: Endoscopy;  Laterality: N/A;    COLONOSCOPY N/A 6/14/2017    Procedure: COLONOSCOPY;  Surgeon: Chris Storey MD;  Location: [address]);  Service: Endoscopy;  Laterality: N/A;  colonoscopy in 3 months with better bowel prep. - Split PEG prep ordered    ESOPHAGOGASTRODUODENOSCOPY N/A 11/10/2021    Procedure: EGD (ESOPHAGOGASTRODUODENOSCOPY);  Surgeon: Kuldeep Velázquez MD;  Location: [address]);  Service: Endoscopy;  Laterality: N/A;  11/4-eliquis hold ok see te -tb-fully vacc-inst mail and verbal-    EXTRACORPOREAL SHOCK WAVE LITHOTRIPSY      INJECTION OF ANESTHETIC AGENT AROUND NERVE Bilateral 6/24/2020     Procedure: BLOCK, NERVE, C4-C5-C6 MEDIAL BRANCH ok per Wood to add on;  Surgeon: Oscar Ravi MD;  Location: [facility] PAIN [facility]GT;  Service: Pain Management;  Laterality: Bilateral;    INTRAOCULAR PROSTHESES INSERTION Right 10/31/2018    Procedure: INSERTION-INTRAOCULAR LENS (IOL);  Surgeon: Keysha Valle MD;  Location: [facility] OR;  Service: Ophthalmology;  Laterality: Right;    JOINT REPLACEMENT      mastoid tumor removal  1969    Left mastoidectomy with residual left facial weakness.    NEPHROSTOMY      OOPHORECTOMY      PHACOEMULSIFICATION OF CATARACT Right 10/31/2018    Procedure: PHACOEMULSIFICATION-ASPIRATION-CATARACT;  Surgeon: Keysha Valle MD;  Location: [facility] OR;  Service: Ophthalmology;  Laterality: Right;    TOTAL ABDOMINAL HYSTERECTOMY  1969    TAHBSO    TOTAL KNEE ARTHROPLASTY  9/13/2012    Left    TRANSFORAMINAL EPIDURAL INJECTION OF STEROID Bilateral 9/17/2019    Procedure: INJECTION, STEROID, EPIDURAL, TRANSFORAMINAL APPROACH;  Surgeon: Oscar Ravi MD;  Location: [facility];  Service: Pain Management;  Laterality: Bilateral;  B/L TFESI L4/L5    TRANSFORAMINAL EPIDURAL INJECTION OF STEROID Bilateral 10/2/2020    Procedure: INJECTION, STEROID, EPIDURAL, TRANSFORAMINAL APPROACH;  Surgeon: Oscar Ravi MD;  Location: [facility];  Service: Pain Management;  Laterality: Bilateral;  B/L TFESI L4/5    TRANSFORAMINAL EPIDURAL INJECTION OF STEROID Bilateral 5/7/2021    Procedure: INJECTION, STEROID, EPIDURAL, TRANSFORAMINAL APPROACH, L4-L5 clear to hold Eliquis 3 days;  Surgeon: Oscar Ravi MD;  Location: [facility]IN [facility];  Service: Pain Management;  Laterality: Bilateral;     Family History   Problem Relation Age of Onset    Sleep apnea Sister     Diabetes Sister     Cancer Mother         brain tumor    Hypertension Mother     Heart disease Father     Heart attack Father     Dementia Brother     Lupus Brother     Frontotemporal dementia Brother     No Known Problems  Daughter     No Known Problems Son     Diabetes Sister     Lupus Sister     Breast cancer Sister     Diabetes Sister     Cancer Sister         breast cancer    Heart attack Sister     Diabetes Sister     Liver cancer Brother     Drug abuse Brother     Alcohol abuse Brother     Cirrhosis Brother     Drug abuse Brother     No Known Problems Daughter     No Known Problems Son     No Known Problems Son     No Known Problems Maternal Grandmother     No Known Problems Maternal Grandfather     No Known Problems Paternal Grandmother     No Known Problems Paternal Grandfather     Diabetes Brother     Dementia Brother     Diabetes Other     Ovarian cancer Other         Niece     Breast cancer Maternal Aunt     No Known Problems Maternal Uncle     No Known Problems Paternal Aunt     No Known Problems Paternal Uncle     Glaucoma Neg Hx     Blindness Neg Hx     Celiac disease Neg Hx     Colon cancer Neg Hx     Colon polyps Neg Hx     Esophageal cancer Neg Hx     Inflammatory bowel disease Neg Hx     Liver disease Neg Hx     Rectal cancer Neg Hx     Stomach cancer Neg Hx     Ulcerative colitis Neg Hx     Melanoma Neg Hx     Multiple sclerosis Neg Hx     Psoriasis Neg Hx     Amblyopia Neg Hx     Cataracts Neg Hx     Macular degeneration Neg Hx     Retinal detachment Neg Hx     Strabismus Neg Hx     Stroke Neg Hx     Thyroid disease Neg Hx      Social History     Tobacco Use    Smoking status: Former Smoker     Packs/day: 1.00     Years: 15.00     Pack years: 15.00     Types: Cigarettes     Quit date: 2000     Years since quittin.0    Smokeless tobacco: Never Used   Substance Use Topics    Alcohol use: No    Drug use: No     Review of Systems   Constitutional: Negative for fatigue and fever.   HENT: Negative for facial swelling and sinus pressure.         Left-sided facial droop, stable per patient     Eyes: Negative for photophobia and visual disturbance.    Respiratory: Negative for cough, choking and shortness of breath.    Cardiovascular: Negative for chest pain and leg swelling.   Gastrointestinal: Negative for abdominal pain, constipation, diarrhea and nausea.   Genitourinary: Negative for difficulty urinating, dysuria and urgency.   Musculoskeletal: Positive for neck pain.        Neck and shoulder soreness,  Pain in left leg and ankle  Pain in left side of head     Neurological: Positive for weakness (left sided upper and lower extremities) and headaches (left sided). Negative for tremors and numbness.   Psychiatric/Behavioral: Negative for confusion.       Physical Exam     Initial Vitals [08/16/22 1922]   BP Pulse Resp Temp SpO2   (!) 155/76 74 18 98.5 °F (36.9 °C) 98 %      MAP       --         Physical Exam    Constitutional:   Uncomfortable appearing   HENT:   Head: Normocephalic and atraumatic.   Eyes: Conjunctivae and EOM are normal. Pupils are equal, round, and reactive to light.   Neck:   Tense, tender left neck and trapezius   Normal range of motion.  Cardiovascular: Normal rate and intact distal pulses.   Pulmonary/Chest: Breath sounds normal. No respiratory distress. She has no wheezes. She exhibits no tenderness.   Abdominal: Abdomen is soft. Bowel sounds are normal. There is no abdominal tenderness. There is no rebound.   Musculoskeletal:      Cervical back: Normal range of motion.      Comments: Pain in left trapezius, neck, bicep  Pain to palpation left ankle and lower leg       Neurological: She is alert. A cranial nerve deficit (facial nerve palsy 2/2 tumor removal complication 40+ years ago) is present.   Diminished strength left upper and lower extremities, 4/5  Headache, left sided   Psychiatric: She has a normal mood and affect.         ED Course   Procedures  Labs Reviewed   CBC W/ AUTO DIFFERENTIAL - Abnormal; Notable for the following components:       Result Value    RBC 3.85 (*)     Hemoglobin 10.9 (*)     Hematocrit 33.5 (*)      Immature Granulocytes 0.6 (*)     Immature Grans (Abs) 0.07 (*)     Mono # 1.1 (*)     All other components within normal limits   COMPREHENSIVE METABOLIC PANEL - Abnormal; Notable for the following components:    Glucose 214 (*)     Albumin 3.2 (*)     eGFR 56.2 (*)     All other components within normal limits   TROPONIN I   MAGNESIUM   PROTIME-INR          Imaging Results          MRI Brain Without Contrast (Final result)  Result time 08/16/22 23:01:39    Final result by Jarvis Mason MD (08/16/22 23:01:39)                 Impression:      No acute infarct or acute intracranial hemorrhage.    Remote infarcts and chronic microhemorrhages as above.    Right maxillary sinus inferior mucosal retention cyst or mucosal thickening.    Electronically signed by resident: Abdulaziz Gillette  Date:    08/16/2022  Time:    22:42    Electronically signed by: Jarvis Mason MD  Date:    08/16/2022  Time:    23:01             Narrative:    EXAMINATION:  MRI BRAIN WITHOUT CONTRAST    CLINICAL HISTORY:  weak on the L;.    TECHNIQUE:  Multiplanar multisequence MR imaging of the brain was performed without contrast.    COMPARISON:  CT head 02/21/2019    MRI brain 02/17/2016    FINDINGS:  Intracranial compartment:    Generalized cerebral volume loss.  Ventricles and sulci are normal in size for age without evidence of hydrocephalus. No extra-axial blood or fluid collections.    Nonspecific periventricular T2 FLAIR signal hyperintensities compatible with chronic microvascular ischemic change.  Bilateral thalamic and basal ganglia remote lacunar type infarcts.  Right thalamus remote microhemorrhage not seen on prior.  Unchanged chronic right cerebellar infarct.  Focus of hypointensity on heme sequence perhaps representing remote microhemorrhage in the left paramedian cerebellum (9:8).  No mass lesion, acute hemorrhage, edema or acute infarct.    Normal vascular flow voids are preserved.    Skull/extracranial contents (limited evaluation):  Prior operative changes of left mastoidectomy similar to prior.  Bone marrow signal intensity is normal.                                 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 The head pain begins midline and towards the left side of her head, is intermittent, sharp, and radiated to her neck. Nothing seems to exacerbate or alleviate the pain.   She also reports pain in her left arm and shoulder, left leg, and left ankle that has been worsening over the past 1 week after she fell on her left side where she braced herself with her left hand with no LOC or head trauma but reports a sprained left ankle.   Differential Diagnosis:   Including but not limited to acute stroke, atypical migraine, chronic pain syndrome, muscle strain  Clinical Tests:   Lab Tests: Ordered  Radiological Study: Ordered  ED Management:  Patient was evaluated and found to have left sided weakness and significant pain on left leg, ankle, shoulder. She has chronic left-sided facial droop. MRI brain for potential stroke ordered. MRI negative for any acute findings. CBC, CMP wnl. Given fioricet for pain with significant improvement in symptoms. Diagnosed with atypical migraine with neuropathic pain. Patient advised to take OTC pain medications, which she had not tried for pain prior to ED presentation.            Attending Attestation:   Physician Attestation Statement for  Resident:  As the supervising MD   Physician Attestation Statement: I have personally seen and examined this patient.   I agree with the above history. -:   As the supervising MD I agree with the above PE.    As the supervising MD I agree with the above treatment, course, plan, and disposition.   -: ATTENDING PHYSICIAN ATTESTATION   I have repeated the key portions of the resident's history and physical face to face with the patient, reviewed and agree with the resident medical documentation, and supervised and managed the medical care of the patient.   I independently reviewed the triage note, vitals, and all ordered labs/rads.  Agree with resident note and plan of care. The resident and I discussed the case and disposition.      Judy Vergara MD  Emergency Medicine Staff   Critical Care Fellow  9:06 PM                           Clinical Impression:   Final diagnoses:  [R52] Complaints of total body pain  [G43.009] Atypical migraine (Primary)          ED Disposition Condition    Discharge Stable        ED Prescriptions     None        Follow-up Information     Follow up With Specialties Details Why Contact Info Additional Information    Jeannine Arriaga MD Internal Medicine In 1 week  [address]  [place] [address]  [phone]       [facility] - Emergency Dept Emergency Medicine  If symptoms worsen [address] [address]  [phone]     [facility] - Neurology [facility] Neurology In 2 weeks  [address] [address]  [phone] 8th Floor Clinic [place] Please park in [facility]. [facility] Please take the C elevator to 8th floor which opens to the lobby.           Lj Rosenberg MD  Resident  08/17/22 0056       Lj Rosenberg MD  Resident  08/17/22 0131       Judy Vergara MD  08/23/22 8219

## 2022-08-17 NOTE — TELEPHONE ENCOUNTER
Kailey Bolanos LPN  You 1 hour ago (12:18 PM)     CW    Pt's niece may initiate conversation with family (pt's children) about choosing to designee as pt's medical POA since pt's children live out of town.  They may ask for paperwork at Sept visit.

## 2022-08-17 NOTE — ED TRIAGE NOTES
Patient arrived to the ED the primary complaint of a headache accompanied by a sharp pain on her whole left side. Patient has a history of tumors that contribute to her history of L facial droop and weakness. Patient reports L facial drooping and weakness are her baseline. Teary eyes for about 40 years. Pt denies any n/v.

## 2022-08-17 NOTE — ED NOTES
Bed: [facility]  Expected date:   Expected time:   Means of arrival:   Comments:

## 2022-08-23 ENCOUNTER — OFFICE VISIT (OUTPATIENT)
Dept: INTERNAL MEDICINE | Facility: CLINIC | Age: 82
End: 2022-08-23
Payer: MEDICARE

## 2022-08-23 VITALS
WEIGHT: 219.44 LBS | OXYGEN SATURATION: 99 % | SYSTOLIC BLOOD PRESSURE: 100 MMHG | DIASTOLIC BLOOD PRESSURE: 60 MMHG | BODY MASS INDEX: 33.26 KG/M2 | HEIGHT: 68 IN | HEART RATE: 78 BPM

## 2022-08-23 DIAGNOSIS — E11.42 TYPE 2 DIABETES MELLITUS WITH DIABETIC POLYNEUROPATHY, WITH LONG-TERM CURRENT USE OF INSULIN: ICD-10-CM

## 2022-08-23 DIAGNOSIS — G89.4 CHRONIC PAIN SYNDROME: ICD-10-CM

## 2022-08-23 DIAGNOSIS — R21 RASH AND NONSPECIFIC SKIN ERUPTION: Primary | ICD-10-CM

## 2022-08-23 DIAGNOSIS — Z79.4 TYPE 2 DIABETES MELLITUS WITH DIABETIC POLYNEUROPATHY, WITH LONG-TERM CURRENT USE OF INSULIN: ICD-10-CM

## 2022-08-23 DIAGNOSIS — K21.9 GASTROESOPHAGEAL REFLUX DISEASE, UNSPECIFIED WHETHER ESOPHAGITIS PRESENT: ICD-10-CM

## 2022-08-23 DIAGNOSIS — I10 ESSENTIAL HYPERTENSION: Chronic | ICD-10-CM

## 2022-08-23 PROCEDURE — 99214 PR OFFICE/OUTPT VISIT, EST, LEVL IV, 30-39 MIN: ICD-10-PCS | Mod: S$GLB,,, | Performed by: PHYSICIAN ASSISTANT

## 2022-08-23 PROCEDURE — 1125F AMNT PAIN NOTED PAIN PRSNT: CPT | Mod: CPTII,S$GLB,, | Performed by: PHYSICIAN ASSISTANT

## 2022-08-23 PROCEDURE — 1101F PR PT FALLS ASSESS DOC 0-1 FALLS W/OUT INJ PAST YR: ICD-10-PCS | Mod: CPTII,S$GLB,, | Performed by: PHYSICIAN ASSISTANT

## 2022-08-23 PROCEDURE — 3288F FALL RISK ASSESSMENT DOCD: CPT | Mod: CPTII,S$GLB,, | Performed by: PHYSICIAN ASSISTANT

## 2022-08-23 PROCEDURE — 3078F PR MOST RECENT DIASTOLIC BLOOD PRESSURE < 80 MM HG: ICD-10-PCS | Mod: CPTII,S$GLB,, | Performed by: PHYSICIAN ASSISTANT

## 2022-08-23 PROCEDURE — 1125F PR PAIN SEVERITY QUANTIFIED, PAIN PRESENT: ICD-10-PCS | Mod: CPTII,S$GLB,, | Performed by: PHYSICIAN ASSISTANT

## 2022-08-23 PROCEDURE — 1159F MED LIST DOCD IN RCRD: CPT | Mod: CPTII,S$GLB,, | Performed by: PHYSICIAN ASSISTANT

## 2022-08-23 PROCEDURE — 3288F PR FALLS RISK ASSESSMENT DOCUMENTED: ICD-10-PCS | Mod: CPTII,S$GLB,, | Performed by: PHYSICIAN ASSISTANT

## 2022-08-23 PROCEDURE — 99214 OFFICE O/P EST MOD 30 MIN: CPT | Mod: S$GLB,,, | Performed by: PHYSICIAN ASSISTANT

## 2022-08-23 PROCEDURE — 99999 PR PBB SHADOW E&M-EST. PATIENT-LVL V: ICD-10-PCS | Mod: PBBFAC,,, | Performed by: PHYSICIAN ASSISTANT

## 2022-08-23 PROCEDURE — 3074F SYST BP LT 130 MM HG: CPT | Mod: CPTII,S$GLB,, | Performed by: PHYSICIAN ASSISTANT

## 2022-08-23 PROCEDURE — 1101F PT FALLS ASSESS-DOCD LE1/YR: CPT | Mod: CPTII,S$GLB,, | Performed by: PHYSICIAN ASSISTANT

## 2022-08-23 PROCEDURE — 3078F DIAST BP <80 MM HG: CPT | Mod: CPTII,S$GLB,, | Performed by: PHYSICIAN ASSISTANT

## 2022-08-23 PROCEDURE — 99999 PR PBB SHADOW E&M-EST. PATIENT-LVL V: CPT | Mod: PBBFAC,,, | Performed by: PHYSICIAN ASSISTANT

## 2022-08-23 PROCEDURE — 1160F PR REVIEW ALL MEDS BY PRESCRIBER/CLIN PHARMACIST DOCUMENTED: ICD-10-PCS | Mod: CPTII,S$GLB,, | Performed by: PHYSICIAN ASSISTANT

## 2022-08-23 PROCEDURE — 1160F RVW MEDS BY RX/DR IN RCRD: CPT | Mod: CPTII,S$GLB,, | Performed by: PHYSICIAN ASSISTANT

## 2022-08-23 PROCEDURE — 3074F PR MOST RECENT SYSTOLIC BLOOD PRESSURE < 130 MM HG: ICD-10-PCS | Mod: CPTII,S$GLB,, | Performed by: PHYSICIAN ASSISTANT

## 2022-08-23 PROCEDURE — 1159F PR MEDICATION LIST DOCUMENTED IN MEDICAL RECORD: ICD-10-PCS | Mod: CPTII,S$GLB,, | Performed by: PHYSICIAN ASSISTANT

## 2022-08-23 RX ORDER — PANTOPRAZOLE SODIUM 40 MG/1
40 TABLET, DELAYED RELEASE ORAL DAILY
Qty: 90 TABLET | Refills: 1 | Status: SHIPPED | OUTPATIENT
Start: 2022-08-23 | End: 2022-12-08 | Stop reason: SDUPTHER

## 2022-08-23 RX ORDER — TRIAMCINOLONE ACETONIDE 1 MG/G
CREAM TOPICAL 2 TIMES DAILY
Qty: 80 G | Refills: 0 | Status: SHIPPED | OUTPATIENT
Start: 2022-08-23 | End: 2023-05-02

## 2022-08-23 NOTE — PROGRESS NOTES
Subjective:       Patient ID: Henrietta Villasenor is a 82 y.o. female.        Chief Complaint: body pain    Henrietta Villasenor is an established patient of Jeannine Arriaga MD here today for urgent care visit.    Rash and itching left forearm and left ankle with a few bumps, excoriations, rash resolving but the area is still very pruritic, not painful.      Stress is significant but tells me she doesn't know why she is so stressed lately.      She is taking oxycodone 5 times/day but still has ongoing pain, shoulders, back, buttocks.      Stomach burns at times and irene antonia helps, thinks she has been off her protonix a few months due to no refills so requests refill.      She has not been checking her home blood sugar.  Lab Results       Component                Value               Date                       HGBA1C                   9.3 (H)             03/17/2022                 HGBA1C                   9.7 (H)             11/16/2021                 HGBA1C                   8.8 (H)             07/28/2021              Prior headache has resolved.         Review of Systems   Constitutional: Negative for chills, diaphoresis, fatigue and fever.   HENT: Negative for congestion and sore throat.    Eyes: Negative for visual disturbance.   Respiratory: Negative for cough, chest tightness and shortness of breath.    Cardiovascular: Negative for chest pain, palpitations and leg swelling.   Gastrointestinal: Negative for abdominal pain, blood in stool, constipation, diarrhea, nausea and vomiting.   Genitourinary: Negative for dysuria, frequency, hematuria and urgency.   Musculoskeletal: Positive for arthralgias and back pain.   Skin: Negative for rash.   Neurological: Negative for dizziness, syncope, weakness and headaches.   Psychiatric/Behavioral: Negative for dysphoric mood and sleep disturbance. The patient is not nervous/anxious.        Objective:      Physical Exam  Vitals and nursing note reviewed.   Constitutional:        Appearance: Normal appearance. She is well-developed.   HENT:      Head: Normocephalic.      Right Ear: External ear normal.      Left Ear: External ear normal.   Eyes:      Pupils: Pupils are equal, round, and reactive to light.   Cardiovascular:      Rate and Rhythm: Normal rate and regular rhythm.      Heart sounds: Normal heart sounds. No murmur heard.    No friction rub. No gallop.   Pulmonary:      Effort: Pulmonary effort is normal. No respiratory distress.      Breath sounds: Normal breath sounds.   Abdominal:      Palpations: Abdomen is soft.      Tenderness: There is no abdominal tenderness.   Skin:     General: Skin is warm and dry.   Neurological:      Mental Status: She is alert.         Assessment:       1. Rash and nonspecific skin eruption    2. Gastroesophageal reflux disease, unspecified whether esophagitis present    3. Chronic pain syndrome    4. Essential hypertension    5. Type 2 diabetes mellitus with diabetic polyneuropathy, with long-term current use of insulin        Plan:       Henrietta was seen today for body pain.    Diagnoses and all orders for this visit:    Rash and nonspecific skin eruption  -     triamcinolone acetonide 0.1% (KENALOG) 0.1 % cream; Apply topically 2 (two) times daily.    Gastroesophageal reflux disease, unspecified whether esophagitis present  -     REFILL: pantoprazole (PROTONIX) 40 MG tablet; Take 1 tablet (40 mg total) by mouth once daily.    Chronic pain syndrome     Essential hypertension - stable and controlled  BP Readings from Last 5 Encounters:   08/23/22 100/60   08/17/22 (!) 152/74   08/16/22 (!) 142/62   07/19/22 122/86   06/09/22 139/70      Type 2 diabetes mellitus with diabetic polyneuropathy, with long-term current use of insulin - monitor home blood sugar  Lab Results   Component Value Date    HGBA1C 9.3 (H) 03/17/2022    HGBA1C 9.7 (H) 11/16/2021    HGBA1C 8.8 (H) 07/28/2021       Pt has been given instructions populated from [employer] database and has  "verbalized understanding of the after visit summary and information contained wherein.    Follow up with a primary care provider. May go to ER for acute shortness of breath, lightheadedness, fever, or any other emergent complaints or changes in condition.    "This note will be shared with the patient"    Future Appointments   Date Time Provider Department Center   9/9/2022 12:30 PM Jeannine Arriaga MD [facility]   10/19/2022 12:45 PM Lorna Ho DPM [facility] POD [facility]                 "

## 2022-08-28 NOTE — PROGRESS NOTES
Subjective:      Patient ID: Henrietta Villasenor is a 82 y.o. female.    Chief Complaint: Nail Care and Diabetes Mellitus (Pcp - Jeannine Arriaga MD - 6/9/22)    Henrietta is a 82 y.o. female who presents to the clinic for evaluation and treatment of high risk feet. Henrietta has a past medical history of Abnormality of lung (11/08/2011), Anxiety, Arthritis, CAD (coronary artery disease), Calculus of ureter (2/22/2011), CHF (congestive heart failure), Chronic back pain (7/29/2012), Colon polyp (9/2010; 11/2010), Colon polyps (7/29/2012), Coronary artery disease involving native coronary artery of native heart with angina pectoris, Depression, Diabetes mellitus, Diabetes mellitus type II, Diverticulitis large intestine (7/27/2015), Diverticulitis of large intestine without perforation or abscess without bleeding, Diverticulosis (09/25/2010; 11/02/2010; 11/08/2011; 7/29/2012), Duodenal disorder (08/25/2011), Duodenal ulcer, unspecified as acute or chronic, without hemorrhage, perforation, or obstruction (8/24/2011), E. 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), 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), Obesity, MARLON (obstructive sleep apnea), PAD (peripheral artery disease) (2/4/2021), Pain, Peripheral neuropathy, Pneumonia, Polyneuropathy, Postherpetic neuralgia, Recurrent nephrolithiasis, S/P knee replacement (10/2/2012), S/P TKR (total knee replacement) (12/26/2012),  Sensorineural hearing loss of both ears, Thyroid disease (11/08/2011), Trouble in sleeping, Type II or unspecified type diabetes mellitus with neurological manifestations, not stated as uncontrolled(250.60), Type II or unspecified type diabetes mellitus with peripheral circulatory disorders, not stated as uncontrolled(250.70), and Type II or unspecified type diabetes mellitus with renal manifestations, not stated as uncontrolled(250.40). The patient's chief complaint is long, thick toenails. This patient has documented high risk feet requiring routine maintenance secondary to diabetes mellitis and those secondary complications of diabetes, as mentioned..       PCP: Jeannine Arriaga MD    Date Last Seen by PCP:   Chief Complaint   Patient presents with    Nail Care    Diabetes Mellitus     Pcp - Jeannine Arriaga MD - 6/9/22         Current shoe gear: casual shoes    Hemoglobin A1C   Date Value Ref Range Status   03/17/2022 9.3 (H) 4.0 - 5.6 % Final     Comment:     ADA Screening Guidelines:  5.7-6.4%  Consistent with prediabetes  >or=6.5%  Consistent with diabetes    High levels of fetal hemoglobin interfere with the HbA1C  assay. Heterozygous hemoglobin variants (HbS, HgC, etc)do  not significantly interfere with this assay.   However, presence of multiple variants may affect accuracy.     11/16/2021 9.7 (H) 4.0 - 5.6 % Final     Comment:     ADA Screening Guidelines:  5.7-6.4%  Consistent with prediabetes  >or=6.5%  Consistent with diabetes    High levels of fetal hemoglobin interfere with the HbA1C  assay. Heterozygous hemoglobin variants (HbS, HgC, etc)do  not significantly interfere with this assay.   However, presence of multiple variants may affect accuracy.     07/28/2021 8.8 (H) 4.0 - 5.6 % Final     Comment:     ADA Screening Guidelines:  5.7-6.4%  Consistent with prediabetes  >or=6.5%  Consistent with diabetes    High levels of fetal hemoglobin interfere with the HbA1C  assay. Heterozygous hemoglobin  variants (HbS, HgC, etc)do  not significantly interfere with this assay.   However, presence of multiple variants may affect accuracy.         Review of Systems   Constitutional: Negative for chills, decreased appetite and fever.   Cardiovascular:  Negative for leg swelling.   Skin:  Positive for color change, dry skin and nail changes. Negative for flushing and itching.   Musculoskeletal:  Positive for arthritis and stiffness. Negative for back pain, falls, joint pain, joint swelling, muscle cramps and myalgias.   Gastrointestinal:  Negative for nausea and vomiting.   Neurological:  Positive for numbness and paresthesias. Negative for loss of balance.           Objective:       There were no vitals filed for this visit.       Physical Exam  Vitals and nursing note reviewed.   Constitutional:       Appearance: She is well-developed.   Cardiovascular:      Comments: Dorsalis pedis and posterior tibial pulses are diminished Bilaterally. Toes are cool to touch. Feet are warm proximally.There is decreased digital hair. Skin is atrophic, slightly hyperpigmented, and mildly edematous      Musculoskeletal:         General: No tenderness. Normal range of motion.      Comments: Adequate joint range of motion without pain, limitation, nor crepitation Bilateral feet and ankle joints. Muscle strength is 5/5 in all groups bilaterally.      semi reducible IPJ digital contracture 2nd toe b/l    Skin:     General: Skin is warm and dry.      Findings: No abrasion, bruising, burn, ecchymosis, erythema or lesion.      Comments: Nails x 10  are elongated by  3-8mm's, thickened by 2-3 mm's, dystrophic, and are darkened in  coloration . Xerosis Bilaterally. No open lesions noted.    Hyperkeratotic tissue noted to distal toes 2and 4 b/l        Neurological:      Mental Status: She is alert and oriented to person, place, and time.      Comments: [place] 5.07 monofilamant testing is diminished M[place]or feet. Sharp/dull sensation  diminished Bilaterally. Light touch absent Bilaterally.       Psychiatric:         Behavior: Behavior normal.             Assessment:       Encounter Diagnoses   Name Primary?    Diabetic polyneuropathy associated with type 2 diabetes mellitus Yes    Corn or callus     Onychomycosis due to dermatophyte          Plan:       Henrietta was seen today for nail care and diabetes mellitus.    Diagnoses and all orders for this visit:    Diabetic polyneuropathy associated with type 2 diabetes mellitus    Corn or callus    Onychomycosis due to dermatophyte    I counseled the patient on her conditions, their implications and medical management.    Shoe inspection. Diabetic Foot Education. Patient reminded of the importance of good nutrition and blood sugar control to help prevent podiatric complications of diabetes. Patient instructed on proper foot hygeine. We discussed wearing proper shoe gear, daily foot inspections, never walking without protective shoe gear, never putting sharp instruments to feet    - With patient's permission, nails were aggressively reduced and debrided x 10 to their soft tissue attachment mechanically and with electric , removing all offending nail and debris. Patient relates relief following the procedure. She will continue to monitor the areas daily, inspect her feet, wear protective shoe gear when ambulatory, moisturizer to maintain skin integrity and follow in this office in approximately 2-3 months, sooner p.r.n.    - After cleansing the  area w/ alcohol prep pad the above mentioned hyperkeratosis was trimmed utilizing No 15 scapel, to a smooth base with out incident. Patient tolerated this  well and reported comfort to the area of distal toes 2 and 4 b/l   .

## 2022-09-08 RX ORDER — OXYCODONE AND ACETAMINOPHEN 10; 325 MG/1; MG/1
1 TABLET ORAL
Qty: 150 TABLET | Refills: 0 | Status: SHIPPED | OUTPATIENT
Start: 2022-09-08 | End: 2022-11-07 | Stop reason: SDUPTHER

## 2022-09-08 NOTE — TELEPHONE ENCOUNTER
No new care gaps identified.  [facility] Embedded Care Gaps. Reference number: [id number]. 9/08/2022   9:06:31 AM STEPHANIET

## 2022-09-09 ENCOUNTER — OFFICE VISIT (OUTPATIENT)
Dept: INTERNAL MEDICINE | Facility: CLINIC | Age: 82
End: 2022-09-09
Payer: MEDICARE

## 2022-09-09 ENCOUNTER — LAB VISIT (OUTPATIENT)
Dept: LAB | Facility: HOSPITAL | Age: 82
End: 2022-09-09
Attending: INTERNAL MEDICINE
Payer: MEDICARE

## 2022-09-09 VITALS
DIASTOLIC BLOOD PRESSURE: 80 MMHG | SYSTOLIC BLOOD PRESSURE: 136 MMHG | WEIGHT: 225.5 LBS | HEIGHT: 68 IN | HEART RATE: 78 BPM | BODY MASS INDEX: 34.17 KG/M2 | OXYGEN SATURATION: 98 %

## 2022-09-09 DIAGNOSIS — Z79.4 TYPE 2 DIABETES MELLITUS WITH DIABETIC POLYNEUROPATHY, WITH LONG-TERM CURRENT USE OF INSULIN: ICD-10-CM

## 2022-09-09 DIAGNOSIS — G89.29 OTHER CHRONIC BACK PAIN: Chronic | ICD-10-CM

## 2022-09-09 DIAGNOSIS — I10 ESSENTIAL HYPERTENSION: Chronic | ICD-10-CM

## 2022-09-09 DIAGNOSIS — E11.42 TYPE 2 DIABETES MELLITUS WITH DIABETIC POLYNEUROPATHY, WITH LONG-TERM CURRENT USE OF INSULIN: ICD-10-CM

## 2022-09-09 DIAGNOSIS — Z79.4 TYPE 2 DIABETES MELLITUS WITH DIABETIC POLYNEUROPATHY, WITH LONG-TERM CURRENT USE OF INSULIN: Primary | ICD-10-CM

## 2022-09-09 DIAGNOSIS — Z23 FLU VACCINE NEED: ICD-10-CM

## 2022-09-09 DIAGNOSIS — D64.9 ANEMIA, UNSPECIFIED TYPE: ICD-10-CM

## 2022-09-09 DIAGNOSIS — M54.9 OTHER CHRONIC BACK PAIN: Chronic | ICD-10-CM

## 2022-09-09 DIAGNOSIS — E11.42 TYPE 2 DIABETES MELLITUS WITH DIABETIC POLYNEUROPATHY, WITH LONG-TERM CURRENT USE OF INSULIN: Primary | ICD-10-CM

## 2022-09-09 DIAGNOSIS — I25.119 CORONARY ARTERY DISEASE INVOLVING NATIVE CORONARY ARTERY OF NATIVE HEART WITH ANGINA PECTORIS: ICD-10-CM

## 2022-09-09 LAB
ALBUMIN SERPL BCP-MCNC: 3.5 G/DL (ref 3.5–5.2)
ALP SERPL-CCNC: 83 U/L (ref 55–135)
ALT SERPL W/O P-5'-P-CCNC: 11 U/L (ref 10–44)
ANION GAP SERPL CALC-SCNC: 10 MMOL/L (ref 8–16)
AST SERPL-CCNC: 17 U/L (ref 10–40)
BASOPHILS # BLD AUTO: 0.07 K/UL (ref 0–0.2)
BASOPHILS NFR BLD: 0.7 % (ref 0–1.9)
BILIRUB SERPL-MCNC: 0.4 MG/DL (ref 0.1–1)
BUN SERPL-MCNC: 16 MG/DL (ref 8–23)
CALCIUM SERPL-MCNC: 9.9 MG/DL (ref 8.7–10.5)
CHLORIDE SERPL-SCNC: 100 MMOL/L (ref 95–110)
CO2 SERPL-SCNC: 31 MMOL/L (ref 23–29)
CREAT SERPL-MCNC: 1.1 MG/DL (ref 0.5–1.4)
DIFFERENTIAL METHOD: ABNORMAL
EOSINOPHIL # BLD AUTO: 0.2 K/UL (ref 0–0.5)
EOSINOPHIL NFR BLD: 2.2 % (ref 0–8)
ERYTHROCYTE [DISTWIDTH] IN BLOOD BY AUTOMATED COUNT: 13.8 % (ref 11.5–14.5)
EST. GFR  (NO RACE VARIABLE): 50.2 ML/MIN/1.73 M^2
ESTIMATED AVG GLUCOSE: 200 MG/DL (ref 68–131)
FERRITIN SERPL-MCNC: 37 NG/ML (ref 20–300)
GLUCOSE SERPL-MCNC: 288 MG/DL (ref 70–110)
HBA1C MFR BLD: 8.6 % (ref 4–5.6)
HCT VFR BLD AUTO: 36.2 % (ref 37–48.5)
HGB BLD-MCNC: 11.5 G/DL (ref 12–16)
IMM GRANULOCYTES # BLD AUTO: 0.04 K/UL (ref 0–0.04)
IMM GRANULOCYTES NFR BLD AUTO: 0.4 % (ref 0–0.5)
IRON SERPL-MCNC: 51 UG/DL (ref 30–160)
LYMPHOCYTES # BLD AUTO: 2.5 K/UL (ref 1–4.8)
LYMPHOCYTES NFR BLD: 23.4 % (ref 18–48)
MCH RBC QN AUTO: 28.9 PG (ref 27–31)
MCHC RBC AUTO-ENTMCNC: 31.8 G/DL (ref 32–36)
MCV RBC AUTO: 91 FL (ref 82–98)
MONOCYTES # BLD AUTO: 1 K/UL (ref 0.3–1)
MONOCYTES NFR BLD: 9.2 % (ref 4–15)
NEUTROPHILS # BLD AUTO: 6.7 K/UL (ref 1.8–7.7)
NEUTROPHILS NFR BLD: 64.1 % (ref 38–73)
NRBC BLD-RTO: 0 /100 WBC
PLATELET # BLD AUTO: 273 K/UL (ref 150–450)
PMV BLD AUTO: 10.4 FL (ref 9.2–12.9)
POTASSIUM SERPL-SCNC: 3.7 MMOL/L (ref 3.5–5.1)
PROT SERPL-MCNC: 7.7 G/DL (ref 6–8.4)
RBC # BLD AUTO: 3.98 M/UL (ref 4–5.4)
SATURATED IRON: 13 % (ref 20–50)
SODIUM SERPL-SCNC: 141 MMOL/L (ref 136–145)
TOTAL IRON BINDING CAPACITY: 391 UG/DL (ref 250–450)
TRANSFERRIN SERPL-MCNC: 264 MG/DL (ref 200–375)
WBC # BLD AUTO: 10.47 K/UL (ref 3.9–12.7)

## 2022-09-09 PROCEDURE — 90694 VACC AIIV4 NO PRSRV 0.5ML IM: CPT | Mod: S$GLB,,, | Performed by: INTERNAL MEDICINE

## 2022-09-09 PROCEDURE — 83036 HEMOGLOBIN GLYCOSYLATED A1C: CPT | Performed by: INTERNAL MEDICINE

## 2022-09-09 PROCEDURE — 3288F FALL RISK ASSESSMENT DOCD: CPT | Mod: CPTII,S$GLB,, | Performed by: INTERNAL MEDICINE

## 2022-09-09 PROCEDURE — 1159F PR MEDICATION LIST DOCUMENTED IN MEDICAL RECORD: ICD-10-PCS | Mod: CPTII,S$GLB,, | Performed by: INTERNAL MEDICINE

## 2022-09-09 PROCEDURE — 1100F PTFALLS ASSESS-DOCD GE2>/YR: CPT | Mod: CPTII,S$GLB,, | Performed by: INTERNAL MEDICINE

## 2022-09-09 PROCEDURE — 1100F PR PT FALLS ASSESS DOC 2+ FALLS/FALL W/INJURY/YR: ICD-10-PCS | Mod: CPTII,S$GLB,, | Performed by: INTERNAL MEDICINE

## 2022-09-09 PROCEDURE — 82728 ASSAY OF FERRITIN: CPT | Performed by: INTERNAL MEDICINE

## 2022-09-09 PROCEDURE — 3288F PR FALLS RISK ASSESSMENT DOCUMENTED: ICD-10-PCS | Mod: CPTII,S$GLB,, | Performed by: INTERNAL MEDICINE

## 2022-09-09 PROCEDURE — 99214 PR OFFICE/OUTPT VISIT, EST, LEVL IV, 30-39 MIN: ICD-10-PCS | Mod: S$GLB,,, | Performed by: INTERNAL MEDICINE

## 2022-09-09 PROCEDURE — G0008 FLU VACCINE - QUADRIVALENT - ADJUVANTED: ICD-10-PCS | Mod: S$GLB,,, | Performed by: INTERNAL MEDICINE

## 2022-09-09 PROCEDURE — 85025 COMPLETE CBC W/AUTO DIFF WBC: CPT | Performed by: INTERNAL MEDICINE

## 2022-09-09 PROCEDURE — 84466 ASSAY OF TRANSFERRIN: CPT | Performed by: INTERNAL MEDICINE

## 2022-09-09 PROCEDURE — 80053 COMPREHEN METABOLIC PANEL: CPT | Performed by: INTERNAL MEDICINE

## 2022-09-09 PROCEDURE — 36415 COLL VENOUS BLD VENIPUNCTURE: CPT | Performed by: INTERNAL MEDICINE

## 2022-09-09 PROCEDURE — 3075F PR MOST RECENT SYSTOLIC BLOOD PRESS GE 130-139MM HG: ICD-10-PCS | Mod: CPTII,S$GLB,, | Performed by: INTERNAL MEDICINE

## 2022-09-09 PROCEDURE — 99214 OFFICE O/P EST MOD 30 MIN: CPT | Mod: S$GLB,,, | Performed by: INTERNAL MEDICINE

## 2022-09-09 PROCEDURE — 1159F MED LIST DOCD IN RCRD: CPT | Mod: CPTII,S$GLB,, | Performed by: INTERNAL MEDICINE

## 2022-09-09 PROCEDURE — 3079F PR MOST RECENT DIASTOLIC BLOOD PRESSURE 80-89 MM HG: ICD-10-PCS | Mod: CPTII,S$GLB,, | Performed by: INTERNAL MEDICINE

## 2022-09-09 PROCEDURE — 99999 PR PBB SHADOW E&M-EST. PATIENT-LVL IV: CPT | Mod: PBBFAC,,, | Performed by: INTERNAL MEDICINE

## 2022-09-09 PROCEDURE — 90694 FLU VACCINE - QUADRIVALENT - ADJUVANTED: ICD-10-PCS | Mod: S$GLB,,, | Performed by: INTERNAL MEDICINE

## 2022-09-09 PROCEDURE — 3075F SYST BP GE 130 - 139MM HG: CPT | Mod: CPTII,S$GLB,, | Performed by: INTERNAL MEDICINE

## 2022-09-09 PROCEDURE — G0008 ADMIN INFLUENZA VIRUS VAC: HCPCS | Mod: S$GLB,,, | Performed by: INTERNAL MEDICINE

## 2022-09-09 PROCEDURE — 99999 PR PBB SHADOW E&M-EST. PATIENT-LVL IV: ICD-10-PCS | Mod: PBBFAC,,, | Performed by: INTERNAL MEDICINE

## 2022-09-09 PROCEDURE — 1125F PR PAIN SEVERITY QUANTIFIED, PAIN PRESENT: ICD-10-PCS | Mod: CPTII,S$GLB,, | Performed by: INTERNAL MEDICINE

## 2022-09-09 PROCEDURE — 1125F AMNT PAIN NOTED PAIN PRSNT: CPT | Mod: CPTII,S$GLB,, | Performed by: INTERNAL MEDICINE

## 2022-09-09 PROCEDURE — 3079F DIAST BP 80-89 MM HG: CPT | Mod: CPTII,S$GLB,, | Performed by: INTERNAL MEDICINE

## 2022-09-09 RX ORDER — OXYCODONE AND ACETAMINOPHEN 10; 325 MG/1; MG/1
1 TABLET ORAL
Qty: 150 TABLET | Refills: 0 | Status: SHIPPED | OUTPATIENT
Start: 2022-10-07 | End: 2022-12-08 | Stop reason: SDUPTHER

## 2022-09-09 RX ORDER — ROPINIROLE 0.5 MG/1
0.5 TABLET, FILM COATED ORAL NIGHTLY
COMMUNITY
End: 2023-03-08 | Stop reason: SDUPTHER

## 2022-09-09 RX ORDER — OXYCODONE AND ACETAMINOPHEN 10; 325 MG/1; MG/1
1 TABLET ORAL
Qty: 150 TABLET | Refills: 0 | Status: SHIPPED | OUTPATIENT
Start: 2022-11-07 | End: 2022-12-08 | Stop reason: SDUPTHER

## 2022-09-09 RX ORDER — POTASSIUM CHLORIDE 750 MG/1
10 TABLET, EXTENDED RELEASE ORAL 2 TIMES DAILY
Qty: 60 TABLET | Refills: 3 | Status: SHIPPED | OUTPATIENT
Start: 2022-09-09 | End: 2023-03-08

## 2022-09-09 NOTE — PROGRESS NOTES
CHIEF COMPLAINT:  Follow up of multple problems.      HISTORY OF PRESENT ILLNESS: This is a 82-year-old woman who presents for follow up of above.  She brings her medicine bottles with her.    [place]. Mood has been good. She visited family [place] and her daughter in [place] which was good for her.     She has had some epigastric pain. Stools have been darker lately.      She is NOT taking Pletal - makes nauseated an d vomiting.       She continues to have neck and shoulder pain, back pain. She complains of back pain and body pain  which has been severe. She is taking oxycodone apap 10/325 up to 5 times daily. She got a different generic - made her itch.  She continues to take duloxetine 60 mg twice daily.  Her mood is doing ok.  Her pain affects her mood.       She saw Dr Reilly on 1/18/22 - she continues to take Eliquis 5 mg wice daily.  She had cardiac PET stress 2/9/2022 which was negative for ischemia          No fever or chills. She has felt cold.  No nausea, vomiting, constipation, dysuria, hematuira.  She denies swallowing problems today.        She is living alone. She does not see her grandson very  much. She has a niece, Kacie Mast who lives locally.      She is taking  amlodipine 10 mg 1 daily, coreg 25 mg twice daily and lasix 40 mg 1/2  tablet once daily for her hypertension and diastolic dysfunction.     Se is taking requipo 0.5  mg at bedtime - not helping the restless leg     She saw Endocrinology on 10/22/20. She should take Lantus 60 units daily and Novolog 24 units once or twice daily with meals.  Blood sugar has been up and down. 125-200.  Weight has been stable.       PAST MEDICAL HISTORY:   1. Diabetes mellitus   2. Hypertension   3. Hyperlipidemia   4. Left heart catheterization January 2007 which revealed luminal irregularities in the LAD, left circumflex and right coronary artery. She had diastolic dysfunction and patent renal  "arteries.   5. Sleep apnea   6. Obesity.   7. Nephrolithiasis status post lithotripsy.   8. Reflux - had nonerosive gastropathy on EGD September 2007. Gastritis on EGD 9/2010  9. Hidradenitis suppurativa.   10. History of diverticulitis with a hospitalized October 2007.   11. Migraines   12. Obesity.   13. 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    14. Total hysterectomy in 1969.   15. Cholecystectomy was done at that time as well.   16. 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   17. Colon polyps on colonscopy 11/2010 - due 2013   18. Hospitalization 12/10 for e coli sepsis due to left ureteral stone with left nephrostomy tube in [place], [place]   19. Left knee replacement 9/2012      MEDICATIONS and ALLERGIES: Updated on EPIC.     PHYSICAL EXAMINATION:    /80 (BP Location: Right arm, Patient Position: Sitting)   Pulse 78   Ht 5' 8" (1.727 m)   Wt 102.3 kg (225 lb 8.5 oz)   LMP  (LMP Unknown)   SpO2 98%   BMI 34.29 kg/m²      GENERAL: She is alert, oriented, no apparent distress. Affect within   normal limits.  Conjunctiva anicteric.    NECK: Supple.   Respiratory: Effort normal. Lungs clear  HEART: Regular rate and rhythm without murmurs, gallops or rubs.   No lower extremity edema.    Swelling at the left ankle.     Stool was light brown - heme negative      labs 3/17/22 reviewed     ASSESSMENT AND PLAN:      1. Hypertension with diastolic heart failure- stable. Negative PET stress.  stable onamlodipine to 10 mg  one tablet daily and continue furosemide 40 mg 1/2 tablet daily. Continue carvedilol 25 mg one tablet twice daily  2.  OA neck and lumbar spine - on oxycodone. stable  3.  Gout - on allopurinol.   4.  Mood disorder -  on duloxetine 60 mg twice daily  5.  Swallowing disorder - s/p EGD with dilation on 11/11/21.   6. Asthma - stable   7. Gerd -should be taking on pantoprazole 40 mg  daily - refilled.   8. Diabetes with " neuropathy- watch sugars- discussed diet. labs  9. Hyperlipidemia - off  lipitor  10. Allergic rhinitis - stable  12. CRI - stable  13. Obesity - discussed diet, exercise and weight loss  14. CAD -risk factor modification  15. Aortic atherosclerosis and possible mesenteric artery stenosis- risk factor modification  16. Tortuous aorta - HTN controlled  17. Colon polyps - Colonoscopy in 8/2013 - 2 polyps. Flex sig 11/2013 - mild granular mucosa. Colonoscopy 6/2017 diverticulosis - no polyps due 2023.   18. hyperparathryodisism -saw nephrology   19. .Restless leg syndrome - on roprinole    20. Morbid obestiy - working on diet.      .  MMG 7/22       I will see her back in 3 month sooner if problems arise

## 2022-09-21 ENCOUNTER — TELEPHONE (OUTPATIENT)
Dept: INTERNAL MEDICINE | Facility: CLINIC | Age: 82
End: 2022-09-21
Payer: MEDICARE

## 2022-09-21 NOTE — TELEPHONE ENCOUNTER
----- Message from Jeannine Arriaga MD sent at 9/15/2022  8:48 PM CDT -----  Please notify pt  Your blood work reveals:  Your blood sugars are doing a little better. Continue to work at diet  Your blood count, blood sugar, kidney function, liver function are stable  Jeannine Arriaga M.D.

## 2022-09-21 NOTE — TELEPHONE ENCOUNTER
Called pt, relayed message from PCP;     Your blood work reveals:  Your blood sugars are doing a little better. Continue to work at diet  Your blood count, blood sugar, kidney function, liver function are stable    Pt verbalized understanding.

## 2022-10-17 NOTE — PROGRESS NOTES
[facility]         Chief Complaint   Patient presents with    BLE pain        Subjective   Patient ID: Henrietta Villasenor is a 82-year-old female with non obstructive CAD, PAD, venous insufficiency, IDDM2, hypertension, morbid obesity, peripheral neuropathy, osteoarthritis s/p LTKA, who presents for a follow up appointment.    -Patient  kindly referred to Vascular medicine clinic by Andreina Aguiar PA-C for evaluation of lower extremity pain.     - At initial clinic visit on 12/01/20, Mrs. Villasenor reported progressive worsening of BLE pain and swelling in her legs for past 2 months. She reports no trauma to legs.  She reports both an aching in her thigh, knee and shin as well as sharp, stabbing intermittent pain radiating from her left lower back down her posterior leg.  She tkes Tylenol at home which helps alleviate the her pain slightly.  She reports associated leg swelling. Patient reports no claudication, CLI, tissue loss, pigmentation changes. She has no history of DVT/PE, no recent surgeries. she was seen in pain management clinic for epidural steroid injection in early October. Patient was seen in the ED on 11/23/20 due to the leg pain. X-rays of hip and knees (11/23/20) revealed no acute malalignment, loosening or displaced fracture-dislocation. BLE Venous Ultrasound (11/23/20) revealed no evidence of DVT, incidental finding of atherosclerosis of popliteal artery was noted.  BALA revealed (11/23/20) BALA of 0.88 on the right indicating mild peripheral artery disease.  Left lower extremity BALA is within normal limits at 0.99.  No hemodynamically significant stenosis demonstrated in the left lower extremity arterial system. Patient was discharged from ED.   Plan  BLE pain (Left > Right) - Pain is likely neuropathic in origin.  Pt has mild PAD, and symptoms are not classic for claudication.  LLE arterial ultrasound on 11/23/2020 revealed BALA of 0.88 on the right indicating mild peripheral artery disease.   Left lower extremity BALA is within normal limits at 0.99.  No hemodynamically significant stenosis demonstrated in the left lower extremity arterial system.   Refer to neurology for evaluation of neuropathic pain.   BLE edema -Likely due to venous insufficiency. Check BLE venous reflux study.  Pt to elevate legs when resting.  Restrict sodium intake up tp 2 g a day and fluid intake to 1.5 L a day.  PAD- LLE arterial ultrasound on 11/23/2020 revealed BALA of 0.88 on the right indicating mild peripheral artery disease.  Left lower extremity BALA is within normal limits at 0.99.  No hemodynamically significant stenosis demonstrated in the left lower extremity arterial system.  Continue asa 81 mg and start Atorvastatin 40 mg for medical management of peripheral artery disease.  Obesity  - BMI today 33.72 Kg/M2. Encourage dietary modification and moderate/aerobic exercise for a minimum of 30 minutes for 5 days a week.     Interval History (12/15/20)  Mrs. Villasenor presents today for vascular study - venous ultrasound of bilateral lower extremities at [facility].   Called by vascular technologist to evaluate the US image findings.   Dr. Mon and I examined and evaluated the patient in the vascular lab today at 1400 on 12/15/20.   She was noted to have acute DVT of bilateral popliteal veins bilaterally.  Patient continues to report pain in bilateral lower extremities (as on her clinic visit on 12/01/20).  Also, reports mild intermittent dyspnea with exertion.  Reports no chest pain, lightheadness or palpitations.    Physical exam significant for TTP bilateral lower extremities, cardiopulmonary exam was unremarkable.   Labs reviewed. Creatine 1.0 baseline.  Assessment: Acute popliteal vein DVT bilateral lower extremities. Given patient reports mild intermittent dyspnea, will evaluate for pulmonary embolism.   Plan: Check CTA chest with contrast to evaluate Pulmonary embolism.  Start apixaban 10 mg BID x 7 days,  and 5 mg BID thereafter.  Update: Chest CTA negative for pulmonary embolism.     -At follow up visit on 12/29/2020, Mrs. Villasenor reported pain in the neck radiating to the left arm and pain in the left hip radiating to the leg.   Patient also noted a lump on the left arm with bruising, close the elbow, circular and tender to palpation.    Plan:   Acute BLE popliteal DVT - Likely unprovoked.  BLE Venous US (12/15/20) revealed Bilateral popliteal vein DVT. Minimal reflux in right GSV in below-knee segment. Refer to hematology for hypercoaguable work up.  Continue anticoagulation with apixaban 5 mg twice a day.   BLE pain (Left > Right) - Pain is likely neuropathic in origin.  Patient has chronic leg and arm pain due to DJD of cervical and Lumbar spine.  Pt has mild PAD, and symptoms are not classic for claudication.  LLE arterial ultrasound on 11/23/2020 revealed BALA of 0.88 on the right indicating mild peripheral artery disease.  Left lower extremity BALA is within normal limits at 0.99.  No hemodynamically significant stenosis demonstrated in the left lower extremity arterial system.  Patient is following with pain clinic.  Refer to neurology for evaluation of neuropathic pain.   Forearm swelling - Likely due to hematoma in setting of recent fall.  Check MRI forearm with contrast to evaluate for soft tissue swelling.   BLE edema -Likely due to venous insufficiency and post thrombotic syndrome. Significant TTP on palpation, no evidence of Phlegmasia cerulea dolens. Pt to elevate legs when resting.  Restrict sodium intake up tp 2 g a day and fluid intake to 1.5 L a day.   PAD- LLE arterial ultrasound on 11/23/2020 revealed BALA of 0.88 on the right indicating mild peripheral artery disease.  Left lower extremity BALA is within normal limits at 0.99.  No hemodynamically significant stenosis demonstrated in the left lower extremity arterial system.  Continue asa 81 mg and start Atorvastatin 40 mg for medical management of  peripheral artery disease.  Obesity  - BMI today 33.42 Kg/M2. Encourage dietary modification and moderate/aerobic exercise for a minimum of 30 minutes for 5 days a week.     -At clinic visit on 2/4/2021, Mrs. Villasenor reported left arm hematoma now healed.  Pt now followed by Neurology for neuropathic pain/neuropathy.  She has no BLE claudication or tissue loss.  Tolerating Eliquis 5 mg bid with no bleeding issues.  MRI left forearm on 1/6/2021 revealed a small fluid collection within the subcutaneous soft tissues overlying the posterolateral aspect of the elbow that demonstrates imaging characteristics favoring a hematoma.    Plan:   Acute BLE popliteal DVT - Likely unprovoked.  BLE Venous US (12/15/20) revealed Bilateral popliteal vein DVT. Minimal reflux in right GSV in below-knee segment. Refer to hematology for hypercoaguable work up.  Continue anticoagulation with apixaban 5 mg twice a day.   BLE pain (Left > Right) - Pain is likely neuropathic in origin.  Pt now followed by Neurology for neuropathic pain/neuropathy.   Forearm swelling - Now resolved.    BLE edema -Likely due to venous insufficiency and post thrombotic syndrome.  Pt to elevate legs when resting.  Restrict sodium intake up tp 2 g a day and fluid intake to 1.5 L a day.   PAD- LLE arterial ultrasound on 11/23/2020 revealed BALA of 0.88 on the right indicating mild peripheral artery disease.  Left lower extremity BALA is within normal limits at 0.99.  No hemodynamically significant stenosis demonstrated in the left lower extremity arterial system.  Continue asa 81 mg and start Atorvastatin 40 mg for medical management of peripheral artery disease.  Obesity  - BMI today 33.42 Kg/M2. Encourage dietary modification and moderate/aerobic exercise for a minimum of 30 minutes for 5 days a week.     -At clinic visit on 4/6/2021, Mrs. Villasenor reported no new symptoms.  She has no significant LE edema or tissue loss.  BLE Venous Reflux Study on 3/30/2021 revealed  minimal BLE venous reflux.  There is no evidence of lower extremity DVT bilaterally.  Previously noted bilateral popliteal vein DVT (12/15/2020) no longer present.  Plan:   Acute BLE popliteal DVT- Appears unprovoked.  BLE Venous Reflux Study on 3/30/2021 no evidence of lower extremity DVT bilaterally.  Previously noted bilateral popliteal vein DVT (12/15/2020) no longer present.  Refer to hematology for evaluation.  Continue anticoagulation with apixaban 5 mg twice a day.   BLE pain (Left > Right) - Pain is likely neuropathic in origin.  Pt now followed by Neurology for neuropathic pain/neuropathy.   BLE edema -Due to venous insufficiency.  BLE Venous Reflux Study on 3/30/2021 revealed minimal BLE venous reflux.  There is no evidence of lower extremity DVT bilaterally.  Start graduated compression hose.  Pt to elevate legs when resting.  LImit sodium intake up tp 2 g a day and fluid intake to 1.5 L a day.   PAD- LLE arterial ultrasound on 11/23/2020 revealed BALA of 0.88 on the right indicating mild peripheral artery disease.  Left lower extremity BALA is within normal limits at 0.99.  No hemodynamically significant stenosis demonstrated in the left lower extremity arterial system.  Continue asa 81 mg and start Atorvastatin 40 mg for medical management of peripheral artery disease.  Obesity- Encourage dietary modification and moderate/aerobic exercise for a minimum of 30 minutes for 5 days a week.    -At clinic visit on 7/6/2021, Mrs. Villasenor reported no claudication or tissue loss.  Main complaint is pain at area of BLE varicose veins.    Plan:   Acute BLE popliteal DVT- Appears unprovoked.  BLE Venous Reflux Study on 3/30/2021 no evidence of lower extremity DVT bilaterally.  Previously noted bilateral popliteal vein DVT (12/15/2020) no longer present.  Continue anticoagulation with apixaban 5 mg twice a day.   BLE pain (Left > Right) - Pain is likely neuropathic in origin.  Pt now followed by Neurology for  neuropathic pain/neuropathy.   BLE edema -Due to venous insufficiency.  BLE Venous Reflux Study on 3/30/2021 revealed minimal BLE venous reflux.  There is no evidence of lower extremity DVT bilaterally.  Continue graduated compression hose.  Pt to elevate legs when resting.  LImit sodium intake up tp 2 g a day and fluid intake to 1.5 L a day.   PAD- LLE arterial ultrasound on 11/23/2020 revealed BALA of 0.88 on the right indicating mild peripheral artery disease.  Left lower extremity BALA is within normal limits at 0.99.  No hemodynamically significant stenosis demonstrated in the left lower extremity arterial system.  Continue asa 81 mg and start Atorvastatin 40 mg for medical management of peripheral artery disease.  Obesity- Encourage dietary modification and moderate/aerobic exercise for a minimum of 30 minutes for 5 days a week.     10/12/2021 clinic visit: Mrs. Villasenor reports frequent episodes of neuropathic pain.  She has no significant edema, claudication or tissue loss.    Plan:  Acute BLE popliteal DVT- Appears unprovoked.  BLE Venous Reflux Study on 3/30/2021 no evidence of lower extremity DVT bilaterally.  Previously noted bilateral popliteal vein DVT (12/15/2020) no longer present.  Continue anticoagulation with apixaban 5 mg twice a day.   BLE pain (Left > Right) - Pain is likely neuropathic in origin.  Refer back to Neurology for evaluation.   BLE edema -Due to venous insufficiency.  BLE Venous Reflux Study on 3/30/2021 revealed minimal BLE venous reflux.  Continue graduated compression hose.  Pt to elevate legs when resting.  LImit sodium intake up tp 2 g a day and fluid intake to 1.5 L a day.   PAD- LLE arterial ultrasound on 11/23/2020 revealed BALA of 0.88 on the right indicating mild peripheral artery disease.  Left lower extremity BALA is within normal limits at 0.99.  No hemodynamically significant stenosis demonstrated in the left lower extremity arterial system.  Continue asa 81 mg and start  Atorvastatin 40 mg for medical management of peripheral artery disease.  Obesity- Encourage dietary modification and moderate/aerobic exercise for a minimum of 30 minutes for 5 days a week.     1/18/2022 clinic visit: Mrs. Villasenor reports itching, pain, and swelling of her legs. She states she has been using compression stockings but not consistently because she has difficulty wearing them. She has also noticed arthritis in her wrists and fingers. She also complains of occasional chest pain with dyspnea with the last episode 2 weeks ago.  Plan:   Acute BLE popliteal DVT- unprovoked.  BLE Venous Reflux Study on 3/30/2021 no evidence of lower extremity DVT bilaterally.  Previously noted bilateral popliteal vein DVT (12/15/2020) no longer present.  Continue anticoagulation with apixaban 5 mg bid.    BLE edema -Due to venous insufficiency.  BLE Venous Reflux Study on 3/30/2021 revealed minimal BLE venous reflux.  Prescribe new compression stockings.  Pt to elevate legs when resting.  LImit sodium intake up tp 2 g a day and fluid intake to 1.5 L a day.   Chest pain- last episode about 2 weeks ago with associated dyspnea. Check PET stress and echo to evaluate for ischemic heart disease.  PAD- LLE arterial ultrasound on 11/23/2020 revealed BALA of 0.88 on the right indicating mild peripheral artery disease.  Left lower extremity BALA is within normal limits at 0.99.  No hemodynamically significant stenosis demonstrated in the left lower extremity arterial system.  Continue asa 81 mg and start Atorvastatin 40 mg for medical management of peripheral artery disease. Encourage daily exercise 30 mins per day, 5 days per week.  HTN- uncontrolled. Increase amlodipine to 10mg.  Obesity- Encourage dietary modification and moderate/aerobic exercise for a minimum of 30 minutes for 5 days a week.     HPI:  Mrs. Villasenor reports pain all over her body, especially at left leg.  States chest pain has fully resolved.  PET Stress Test on 2/9/2022  revealed no clinically significant regional resting or stress induced perfusion abnormalities.  MRI Lumbar Spine 11/12/2021 revealed multilevel degenerative disc disease, most severe at L4-L5 with moderate spinal canal and bilateral neural foraminal narrowing.    Past Medical History:   Diagnosis Date    Abnormality of lung 11/08/2011    Stable bandlike opacities at the lung bases, most likely representing      Anxiety     Arthritis     CAD (coronary artery disease)     Calculus of ureter 2/22/2011    CHF (congestive heart failure)     Chronic back pain 7/29/2012    Colon polyp 9/2010; 11/2010    Colon polyps 7/29/2012    Coronary artery disease involving native coronary artery of native heart with angina pectoris     Depression     Diabetes mellitus     Diabetes mellitus type II     Diverticulitis large intestine 7/27/2015    Diverticulitis of large intestine without perforation or abscess without bleeding     Diverticulosis 09/25/2010; 11/02/2010; 11/08/2011; 7/29/2012    Duodenal disorder 08/25/2011    Duodenal erosion noted on EGD.    Duodenal ulcer, unspecified as acute or chronic, without hemorrhage, perforation, or obstruction 8/24/2011    E. coli sepsis 12/2010    Due to left ureteral stone with left nephrostomy tube - hospitalized in [place]    Esophageal dysmotility 01/24/2012    Noted on upper GI-barium swallow.    Facial weakness 1969    Left facial weakness s/p left mastoidectomy in ~ 1969.    Fatty liver 11/08/2011    Reported on CT-abdomen and in 06/2012 Gastro clinic visit note.    Gastric polyp 09/29/2010    GERD (gastroesophageal reflux disease) 7/29/2012    Hepatomegaly 11/08/2011    Reported on CT-abdomen    Herpes zoster with other nervous system complications(053.19) 2/28/2011    Hiatal hernia 06/26/2006; 09/29/2010; 08/25/2011    Noted on barium swallow 2006; noted on  EGD 2011.   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    Trouble in sleeping     Type II or unspecified type diabetes mellitus with neurological manifestations, not stated as uncontrolled(250.60)     Type II or unspecified type diabetes mellitus with peripheral circulatory disorders, not stated as uncontrolled(250.70)     Type II or unspecified type diabetes mellitus with renal manifestations, not stated as uncontrolled(250.40)        Past Surgical History:   Procedure Laterality Date    BELPHAROPTOSIS REPAIR      s/p LAMBERTO levator repair - Dr. Dejesus    CARDIAC CATHETERIZATION      CARPAL TUNNEL RELEASE  3/13/2012    CATARACT EXTRACTION W/  INTRAOCULAR LENS IMPLANT Right 10/31/2018        CATARACT EXTRACTION W/  INTRAOCULAR LENS IMPLANT Left 03/22/2018        CHOLECYSTECTOMY      COLONOSCOPY N/A 11/16/2016    Procedure: COLONOSCOPY;  Surgeon: Chris Storey MD;  Location: [address]);  Service: Endoscopy;  Laterality: N/A;    COLONOSCOPY N/A 6/14/2017    Procedure: COLONOSCOPY;  Surgeon: Chris Storey MD;  Location: [facility] ([address] [place] [address]);  Service: Endoscopy;  Laterality: N/A;  colonoscopy in 3 months with better bowel prep. - Split PEG prep ordered    ESOPHAGOGASTRODUODENOSCOPY N/A 11/10/2021    Procedure: EGD  (ESOPHAGOGASTRODUODENOSCOPY);  Surgeon: Kuldeep Velázquez MD;  Location: [facility] ENDO (4TH FLR);  Service: Endoscopy;  Laterality: N/A;  11/4-eliquis hold ok see te -tb-fully vacc-inst mail and verbal-    EXTRACORPOREAL SHOCK WAVE LITHOTRIPSY      INJECTION OF ANESTHETIC AGENT AROUND NERVE Bilateral 6/24/2020    Procedure: BLOCK, NERVE, C4-C5-C6 MEDIAL BRANCH ok per Talia to add on;  Surgeon: Oscar Ravi MD;  Location: [facility] PAIN MGT;  Service: Pain Management;  Laterality: Bilateral;    INTRAOCULAR PROSTHESES INSERTION Right 10/31/2018    Procedure: INSERTION-INTRAOCULAR LENS (IOL);  Surgeon: Keysha Valle MD;  [facility];  Service: Ophthalmology;  Laterality: Right;    JOINT REPLACEMENT      mastoid tumor removal  1969    Left mastoidectomy with residual left facial weakness.    NEPHROSTOMY      OOPHORECTOMY      PHACOEMULSIFICATION OF CATARACT Right 10/31/2018    Procedure: PHACOEMULSIFICATION-ASPIRATION-CATARACT;  Surgeon: Keysha Valle MD;  [facility];  Service: Ophthalmology;  Laterality: Right;    TOTAL ABDOMINAL HYSTERECTOMY  1969    TAHBSO    TOTAL KNEE ARTHROPLASTY  9/13/2012    Left    TRANSFORAMINAL EPIDURAL INJECTION OF STEROID Bilateral 9/17/2019    Procedure: INJECTION, STEROID, EPIDURAL, TRANSFORAMINAL APPROACH;  Surgeon: Oscar Ravi MD;  Location: [facility] PAIN MGT;  Service: Pain Management;  Laterality: Bilateral;  B/L TFESI L4/L5    TRANSFORAMINAL EPIDURAL INJECTION OF STEROID Bilateral 10/2/2020    Procedure: INJECTION, STEROID, EPIDURAL, TRANSFORAMINAL APPROACH;  Surgeon: Oscar Ravi MD;  Location: [facility] PAIN MGT;  Service: Pain Management;  Laterality: Bilateral;  B/L TFESI L4/5    TRANSFORAMINAL EPIDURAL INJECTION OF STEROID Bilateral 5/7/2021    Procedure: INJECTION, STEROID, EPIDURAL, TRANSFORAMINAL APPROACH, L4-L5 clear to hold Eliquis 3 days;  Surgeon: Oscar Ravi MD;  Location: [facility] PAIN MGT;  Service: Pain Management;  Laterality: Bilateral;        Family History   Problem Relation Age of Onset    Sleep apnea Sister     Diabetes Sister     Cancer Mother         brain tumor    Hypertension Mother     Heart disease Father     Heart attack Father     Dementia Brother     Lupus Brother     Frontotemporal dementia Brother     No Known Problems Daughter     No Known Problems Son     Diabetes Sister     Lupus Sister     Breast cancer Sister     Diabetes Sister     Cancer Sister         breast cancer    Heart attack Sister     Diabetes Sister     Liver cancer Brother     Drug abuse Brother     Alcohol abuse Brother     Cirrhosis Brother     Drug abuse Brother     No Known Problems Daughter     No Known Problems Son     No Known Problems Son     No Known Problems Maternal Grandmother     No Known Problems Maternal Grandfather     No Known Problems Paternal Grandmother     No Known Problems Paternal Grandfather     Diabetes Brother     Dementia Brother     Diabetes Other     Ovarian cancer Other         Niece     Breast cancer Maternal Aunt     No Known Problems Maternal Uncle     No Known Problems Paternal Aunt     No Known Problems Paternal Uncle     Glaucoma Neg Hx     Blindness Neg Hx     Celiac disease Neg Hx     Colon cancer Neg Hx     Colon polyps Neg Hx     Esophageal cancer Neg Hx     Inflammatory bowel disease Neg Hx     Liver disease Neg Hx     Rectal cancer Neg Hx     Stomach cancer Neg Hx     Ulcerative colitis Neg Hx     Melanoma Neg Hx     Multiple sclerosis Neg Hx     Psoriasis Neg Hx     Amblyopia Neg Hx     Cataracts Neg Hx     Macular degeneration Neg Hx     Retinal detachment Neg Hx     Strabismus Neg Hx     Stroke Neg Hx     Thyroid disease Neg Hx        Social History     Tobacco Use    Smoking status: Former Smoker     Packs/day: 1.00     Years: 15.00     Pack years: 15.00     Types: Cigarettes     Quit date: 2000     Years since quittin.7    Smokeless tobacco: Never Used  "  Substance Use Topics    Alcohol use: No       Review of patient's allergies indicates:   Allergen Reactions    Crestor [rosuvastatin]      Cramping in legs    Ezetimibe Diarrhea     Other reaction(s): abdominal pain, Diarrhea      Hydrocodone Itching    Lisinopril      Other reaction(s): cough      Sulfa (sulfonamide antibiotics) Itching and Rash           Sulfamethoxazole     Sulfamethoxazole-trimethoprim     Valsartan Swelling       Current Outpatient Medications on File Prior to Visit   Medication Sig Dispense Refill    ACCU-CHEK GUIDE TEST STRIPS Strp USE TO CHECK BLOOD SUGAR THREE TIMES DAILY 150 strip 4    ACCU-CHEK SMARTVIEW TEST STRIP Strp TEST THREE TIMES DAILY 300 strip 3    albuterol (VENTOLIN HFA) 90 mcg/actuation inhaler Inhale 2 puffs by mouth into the lungs every 4 (four) hours as needed for Wheezing. Rescue 18 g 1    allopurinoL (ZYLOPRIM) 100 MG tablet Take 1 tablet (100 mg total) by mouth once daily. 90 tablet 3    amLODIPine (NORVASC) 5 MG tablet Take 1 tablet (5 mg total) by mouth once daily. 90 tablet 3    aspirin (ECOTRIN) 81 MG EC tablet Take 1 tablet by mouth Daily.      atorvastatin (LIPITOR) 40 MG tablet TAKE 1 TABLET EVERY DAY 90 tablet 3    azelastine (ASTELIN) 137 mcg (0.1 %) nasal spray Spray 1 spray (137 mcg total) by Nasal route 2 (two) times daily. 30 mL 3    BD ALCOHOL SWABS PadM USE  WHEN  TESTING BLOOD SUGAR FOUR TIMES DAILY 400 each 3    BD ULTRA-FINE OLU PEN NEEDLES 32 x 5/32 " Ndle Uses 4 times daily, on multiple daily insulin injections 130 each 12    blood-glucose meter kit Use as instructed 1 each 0    carbamide peroxide (EAR WAX REMOVAL DROPS OTIC)       carvediloL (COREG) 25 MG tablet Take 1 tablet (25 mg total) by mouth 2 (two) times daily. 180 tablet 3    cholecalciferol, vitamin D3, (DIALYVITE VITAMIN D) 5,000 unit capsule Take 5,000 Units by mouth once daily.      DULoxetine (CYMBALTA) 60 MG capsule Take 1 capsule (60 mg total) by mouth 2 " "(two) times daily. 180 capsule 3    fluticasone (FLONASE) 50 mcg/actuation nasal spray SHAKE LQ AND U 2 SPRAYS IEN QD 1 Bottle 6    furosemide (LASIX) 40 MG tablet TAKE 1 TABLET BY MOUTH TWICE DAILY 180 tablet 4    insulin (LANTUS SOLOSTAR U-100 INSULIN) glargine 100 units/mL (3mL) SubQ pen Inject 30 Units into the skin 2 (two) times a day. 60 mL 1    insulin aspart U-100 (NOVOLOG) 100 unit/mL (3 mL) InPn pen Inject 24 Units into the skin before breakfast AND 28 Units daily with lunch AND 28 Units before dinner. 120 mL 3    lancets (ACCU-CHEK FASTCLIX LANCING DEV) Misc Lancets and device = check blood sugar 4 times daily 400 each 4    linaGLIPtin (TRADJENTA) 5 mg Tab tablet Take 1 tablet (5 mg total) by mouth once daily. 90 tablet 3    loratadine (CLARITIN) 10 mg tablet Take 1 tablet (10 mg total) by mouth once daily. 30 tablet 4    meclizine (ANTIVERT) 12.5 mg tablet Take 1 tablet (12.5 mg total) by mouth 3 (three) times daily as needed for Dizziness. 30 tablet 0    olopatadine (PATADAY) 0.2 % Drop Place 1 drop into both eyes once daily. 2.5 mL 12    [START ON 12/10/2020] oxyCODONE-acetaminophen (PERCOCET)  mg per tablet Take 1 tablet by mouth 5 (five) times daily. 150 tablet 0    pantoprazole (PROTONIX) 40 MG tablet TAKE 1 TABLET(40 MG) BY MOUTH twice daily 180 tablet 4    pen needle, diabetic (SURE-FINE PEN NEEDLES) 29 gauge x 1/2" Ndle Use 4 times daily 400 each 4    rOPINIRole (REQUIP) 0.5 MG tablet TAKE 1 TABLET (0.5 MG TOTAL) BY MOUTH EVERY EVENING. 90 tablet 1    TRUEPLUS LANCETS 33 gauge Misc TEST BLOOD SUGAR FOUR TIMES DAILY 400 each 4     Review of Systems   Constitution: Negative for chills, fever and malaise/fatigue.   HENT: Negative for congestion and hoarse voice.    Eyes: Negative for visual disturbance.   Cardiovascular: Positive for left knee swelling. Negative for dyspnea on exertion, orthopnea and paroxysmal nocturnal dyspnea.   Respiratory: Negative for cough, shortness of " breath and sputum production.    Endocrine: Negative for cold intolerance, heat intolerance, polydipsia and polyuria.   Hematologic/Lymphatic: Negative for adenopathy and bleeding problem. Does not bruise/bleed easily.   Skin: Negative for color change, flushing and itching.   Musculoskeletal: Positive for leg pain and tenderness to palpation.  Left arm swelling near elbow. Negative for back pain, joint pain, joint swelling and muscle cramps.   Gastrointestinal: Negative for abdominal pain, constipation, diarrhea and nausea.   Genitourinary: Negative for pelvic pain.   Neurological: Negative for focal weakness, light-headedness, loss of balance, paresthesias, seizures and weakness    Objective   LMP  (LMP Unknown)     Physical Exam   Constitutional: No acute distress, conversant  HEENT: Sclera anicteric, Pupils equal, round and reactive to light, extraocular motions intact, Oropharynx clear  Neck: No JVD, no carotid bruits  Cardiovascular: regular rate and rhythm, no murmur, rubs or gallops, normal S1/S2  Pulmonary: Clear to auscultation bilaterally  Abdominal: Abdomen soft, nontender, nondistended, positive bowel sounds  Extremities: BLE's with prominent varicose veins and TTP. No significant edema. Left lateral knee pain tender to palpation, no knee effusion, or warmth.   Pulses:  Carotid pulses are 2+ on the right side, and 2+ on the left side.  Radial pulses are 2+ on the right side, and 2+ on the left side.   Femoral pulses are 2+ on the right side, and 2+ on the left side.  Popliteal pulses are 2+ on the right side, and 2+ on the left side.   Dorsalis pedis pulses are 2+ on the right side, and 2+ on the left side.   Posterior tibial pulses are 2+ on the right side, and 2+ on the left side.    Skin: No ecchymosis, erythema, or ulcers  Psych: Alert and oriented x 3, appropriate affect  Neuro: CNII-XII intact, no focal deficits    CMP  Sodium   Date Value Ref Range Status   03/17/2022 142 136 - 145 mmol/L Final      Potassium   Date Value Ref Range Status   03/17/2022 3.1 (L) 3.5 - 5.1 mmol/L Final     Chloride   Date Value Ref Range Status   03/17/2022 99 95 - 110 mmol/L Final     CO2   Date Value Ref Range Status   03/17/2022 31 (H) 23 - 29 mmol/L Final     Glucose   Date Value Ref Range Status   03/17/2022 246 (H) 70 - 110 mg/dL Final     BUN   Date Value Ref Range Status   03/17/2022 9 8 - 23 mg/dL Final     Creatinine   Date Value Ref Range Status   03/17/2022 0.9 0.5 - 1.4 mg/dL Final     Calcium   Date Value Ref Range Status   03/17/2022 9.9 8.7 - 10.5 mg/dL Final     Total Protein   Date Value Ref Range Status   03/17/2022 7.3 6.0 - 8.4 g/dL Final     Albumin   Date Value Ref Range Status   03/17/2022 3.2 (L) 3.5 - 5.2 g/dL Final     Total Bilirubin   Date Value Ref Range Status   03/17/2022 0.5 0.1 - 1.0 mg/dL Final     Comment:     For infants and newborns, interpretation of results should be based  on gestational age, weight and in agreement with clinical  observations.    Premature Infant recommended reference ranges:  Up to 24 hours.............<8.0 mg/dL  Up to 48 hours............<12.0 mg/dL  3-5 days..................<15.0 mg/dL  6-29 days.................<15.0 mg/dL       Alkaline Phosphatase   Date Value Ref Range Status   03/17/2022 84 55 - 135 U/L Final     AST   Date Value Ref Range Status   03/17/2022 18 10 - 40 U/L Final     ALT   Date Value Ref Range Status   03/17/2022 12 10 - 44 U/L Final     Anion Gap   Date Value Ref Range Status   03/17/2022 12 8 - 16 mmol/L Final     eGFR if    Date Value Ref Range Status   03/17/2022 >60.0 >60 mL/min/1.73 m^2 Final     eGFR if non    Date Value Ref Range Status   03/17/2022 59.7 (A) >60 mL/min/1.73 m^2 Final     Comment:     Calculation used to obtain the estimated glomerular filtration  rate (eGFR) is the CKD-EPI equation.          Lab Results   Component Value Date    WBC 9.99 03/17/2022    HGB 11.4 (L) 03/17/2022    HCT 36.9  (L) 03/17/2022    MCV 91 03/17/2022     03/17/2022       Imaging/Diagnostics    MRI Lumbar Spine 11/12/2021:  Multilevel degenerative disc disease, most severe at L4-L5 with moderate spinal canal and bilateral neural foraminal narrowing.    PET Stress Test 2/9/2022:    The relative PET images show no clinically significant regional resting or stress induced perfusion abnormalities.    The whole heart absolute myocardial perfusion values averaged 1.33 cc/min/g at rest, which is elevated; 1.79 cc/min/g at stress, which is mildly reduced; and CFR is 1.35 , which is moderately reduced in part due to elevated resting flow.    CT attenuation images demonstrate moderate diffuse coronary calcifications in the LAD and LCX territory.    The gated perfusion images showed an ejection fraction of 42% at rest and 56% during stress. A normal ejection fraction is greater than 53%.    The wall motion is normal at rest and during stress.    The LV cavity size is normal at rest and stress.    The EKG portion of this study is negative for ischemia.    During stress, occasional PVCs are noted.    The patient reported no chest pain during the stress test.    BLE Venous Reflux Study 3/30/2021:  No evidence of lower extremity DVT bilaterally.  Previously noted bilateral popliteal vein DVT (see report dated 12/15/2020) no longer present.  Minimal right common femoral vein reflux (deep venous insufficiency).  Minimal right GSV reflux in above knee segment.  Minimal left GSV reflux in below knee segment.    MRI Left Forearm 1/6/2021   Small fluid collection within the subcutaneous soft tissues overlying the posterolateral aspect of the elbow that demonstrates imaging characteristics favoring a hematoma and correlating with the reported history of recent trauma.  Consider follow-up ultrasound in 3 months to ensure resolution.    BLE Venous US (12/15/20)  Bilateral popliteal vein DVT.  Minimal reflux in right GSV in below-knee  segment    XR Hip (11/23/20)  FINDINGS:  Femoral heads appear normally positioned.  No evidence of acute fracture, subluxation or dislocation.  Mild-moderate joint space narrowing of the hips bilaterally.  Mild degenerative osteophytic spurring of the acetabulum bilaterally   SI joints appear intact.  Pubic rami are intact.  Vascular atherosclerosis.  Impression:  No acute radiographic abnormality.     LLE arterial ultrasound (11/23/20)  Impression:  BALA of 0.88 on the right indicating mild peripheral artery disease.  Left lower extremity BALA is within normal limits at 0.99.   No hemodynamically significant stenosis demonstrated in the left lower extremity arterial system.    XR Knees (11/23/20)  Impression:  Remote left TKA without evidence of acute malalignment, loosening or displaced fracture-dislocation.    Assessment and Plan:  Henrietta Villasenor is a 82-year-old female with non obstructive CAD, PAD, venous insufficiency, IDDM2, hypertension, morbid obesity, peripheral neuropathy, osteoarthritis s/p LTKA, who presents for a follow up appointment.    1. Acute BLE popliteal DVT- unprovoked.  BLE Venous Reflux Study on 3/30/2021 no evidence of lower extremity DVT bilaterally.  Previously noted bilateral popliteal vein DVT (12/15/2020) no longer present.  Continue anticoagulation with apixaban 5 mg twice a day.    2. BLE edema -Due to venous insufficiency.  BLE Venous Reflux Study on 3/30/2021 revealed minimal BLE venous reflux.  Prescribe new compression stockings.  Pt to elevate legs when resting.  LImit sodium intake up tp 2 g a day and fluid intake to 1.5 L a day.     3. Chest pain- Mrs. Villasenor states chest pain has fully resolved.  PET Stress Test on 2/9/2022 revealed no clinically significant regional resting or stress induced perfusion abnormalities.  Continue current medications.     4. PAD- LLE arterial ultrasound on 11/23/2020 revealed BALA of 0.88 on the right indicating mild peripheral artery disease.  Left  lower extremity BALA is within normal limits at 0.99.  No hemodynamically significant stenosis demonstrated in the left lower extremity arterial system.  Continue asa 81 mg and start Atorvastatin 40 mg for medical management of peripheral artery disease. Encourage daily exercise 30 mins per day, 5 days per week.    5. HTN- uncontrolled. Increase amlodipine to 10mg.    6. Obesity- Encourage dietary modification and moderate/aerobic exercise for a minimum of 30 minutes for 5 days a week.     7. Back Pain/Leg Pain- Likely due to severe degenerative disc disease.  MRI Lumbar Spine 11/12/2021 revealed multilevel degenerative disc disease, most severe at L4-L5 with moderate spinal canal and bilateral neural foraminal narrowing.  Refer to Neurosurgery for evaluation.  Check CTA abd/pelvis to evaluated for flow limiting PAD.    Follow up in 3 months    Total duration of face to face visit time 30 minutes.  Total time spent counseling greater than fifty percent of total visit time.  Counseling included discussion regarding imaging findings, diagnosis, possibilities, treatment options, risks and benefits.  The patient had many questions regarding the options and long-term effects.    Raman Reilly MD, PhD  Interventional Cardiology         Bilobed Flap Text: The defect edges were debeveled with a #15 scalpel blade.  Given the location of the defect and the proximity to free margins a bilobe flap was deemed most appropriate.  Using a sterile surgical marker, an appropriate bilobe flap drawn around the defect.    The area thus outlined was incised deep to adipose tissue with a #15 scalpel blade.  The skin margins were undermined to an appropriate distance in all directions utilizing iris scissors.

## 2022-10-19 ENCOUNTER — OFFICE VISIT (OUTPATIENT)
Dept: PODIATRY | Facility: CLINIC | Age: 82
End: 2022-10-19
Payer: MEDICARE

## 2022-10-19 VITALS
DIASTOLIC BLOOD PRESSURE: 79 MMHG | WEIGHT: 235.88 LBS | HEIGHT: 68 IN | BODY MASS INDEX: 35.75 KG/M2 | HEART RATE: 78 BPM | SYSTOLIC BLOOD PRESSURE: 147 MMHG

## 2022-10-19 DIAGNOSIS — E11.42 DIABETIC POLYNEUROPATHY ASSOCIATED WITH TYPE 2 DIABETES MELLITUS: Primary | ICD-10-CM

## 2022-10-19 DIAGNOSIS — L84 CORN OR CALLUS: ICD-10-CM

## 2022-10-19 DIAGNOSIS — B35.1 ONYCHOMYCOSIS DUE TO DERMATOPHYTE: ICD-10-CM

## 2022-10-19 PROCEDURE — 3077F PR MOST RECENT SYSTOLIC BLOOD PRESSURE >= 140 MM HG: ICD-10-PCS | Mod: HCNC,CPTII,S$GLB, | Performed by: PODIATRIST

## 2022-10-19 PROCEDURE — 3078F PR MOST RECENT DIASTOLIC BLOOD PRESSURE < 80 MM HG: ICD-10-PCS | Mod: HCNC,CPTII,S$GLB, | Performed by: PODIATRIST

## 2022-10-19 PROCEDURE — 3077F SYST BP >= 140 MM HG: CPT | Mod: HCNC,CPTII,S$GLB, | Performed by: PODIATRIST

## 2022-10-19 PROCEDURE — 99499 NO LOS: ICD-10-PCS | Mod: HCNC,S$GLB,, | Performed by: PODIATRIST

## 2022-10-19 PROCEDURE — 11721 DEBRIDE NAIL 6 OR MORE: CPT | Mod: 59,Q9,HCNC,S$GLB | Performed by: PODIATRIST

## 2022-10-19 PROCEDURE — 11721 PR DEBRIDEMENT OF NAILS, 6 OR MORE: ICD-10-PCS | Mod: 59,Q9,HCNC,S$GLB | Performed by: PODIATRIST

## 2022-10-19 PROCEDURE — 1126F PR PAIN SEVERITY QUANTIFIED, NO PAIN PRESENT: ICD-10-PCS | Mod: HCNC,CPTII,S$GLB, | Performed by: PODIATRIST

## 2022-10-19 PROCEDURE — 99999 PR PBB SHADOW E&M-EST. PATIENT-LVL IV: ICD-10-PCS | Mod: PBBFAC,HCNC,, | Performed by: PODIATRIST

## 2022-10-19 PROCEDURE — 99499 UNLISTED E&M SERVICE: CPT | Mod: HCNC,S$GLB,, | Performed by: PODIATRIST

## 2022-10-19 PROCEDURE — 1126F AMNT PAIN NOTED NONE PRSNT: CPT | Mod: HCNC,CPTII,S$GLB, | Performed by: PODIATRIST

## 2022-10-19 PROCEDURE — 3078F DIAST BP <80 MM HG: CPT | Mod: HCNC,CPTII,S$GLB, | Performed by: PODIATRIST

## 2022-10-19 PROCEDURE — 11056 PR TRIM BENIGN HYPERKERATOTIC SKIN LESION,2-4: ICD-10-PCS | Mod: Q9,HCNC,S$GLB, | Performed by: PODIATRIST

## 2022-10-19 PROCEDURE — 11056 PARNG/CUTG B9 HYPRKR LES 2-4: CPT | Mod: Q9,HCNC,S$GLB, | Performed by: PODIATRIST

## 2022-10-19 PROCEDURE — 99999 PR PBB SHADOW E&M-EST. PATIENT-LVL IV: CPT | Mod: PBBFAC,HCNC,, | Performed by: PODIATRIST

## 2022-10-19 RX ORDER — IOHEXOL 350 MG/ML
INJECTION, SOLUTION INTRAVENOUS
COMMUNITY
Start: 2022-07-15 | End: 2022-12-08

## 2022-11-07 RX ORDER — OXYCODONE AND ACETAMINOPHEN 10; 325 MG/1; MG/1
1 TABLET ORAL
Qty: 150 TABLET | Refills: 0 | Status: SHIPPED | OUTPATIENT
Start: 2022-11-07 | End: 2022-12-08 | Stop reason: SDUPTHER

## 2022-11-07 NOTE — TELEPHONE ENCOUNTER
----- Message from Clover Shrestha sent at 11/7/2022  8:44 AM CST -----  Contact: [phone] Patient  Requesting an RX refill or new RX.  Is this a refill or new RX: new  RX name and strength (copy/paste from chart): oxyCODONE-acetaminophen (PERCOCET)  mg per tablet  Is this a 30 day or 90 day RX:   Pharmacy name and phone # (copy/paste from chart):    [facility]  [address] [place] [id number]  Phone: [phone] Fax: [phone]

## 2022-11-07 NOTE — TELEPHONE ENCOUNTER
Care Due:                  Date            Visit Type   Department     Provider  --------------------------------------------------------------------------------                                EP -                              PRIMARY      [facility] INTERNAL  Last Visit: 09-      CARE ([facility])   MEDICINE       FEDERICO MARCIAL                              EP                               PRIMARY      [facility] INTERNAL  Next Visit: 12-      CARE ([facility])   MEDICINE       FEDERICO MARCIAL                                                            Last  Test          Frequency    Reason                     Performed    Due Date  --------------------------------------------------------------------------------    Uric Acid...  12 months..  allopurinoL..............  07- 07-    [employer] Embedded Care Gaps. Reference number: [id number]. 11/07/2022   8:55:10 AM CST

## 2022-11-07 NOTE — TELEPHONE ENCOUNTER
----- Message from Kelle Ackerman sent at 11/7/2022  1:15 PM CST -----  Contact: self [phone]  Requesting an RX refill or new RX.  Is this a refill or new RX: refill  RX name and strength (copy/paste from chart):  oxyCODONE-acetaminophen (PERCOCET)  mg per tablet  Is this a 30 day or 90 day RX:   Pharmacy name and phone # (copy/paste from chart):    [facility]  [address] [place] [address]  Phone: [phone] Fax: [phone]        The doctors have asked that we provide their patients with the following 2 reminders -- prescription refills can take up to 72 hours, and a friendly reminder that in the future you can use your [facility] account to request refills: yes    Please call and advise

## 2022-11-11 NOTE — ADDENDUM NOTE
"  Problem: Plan of Care - These are the overarching goals to be used throughout the patient stay.    Description: The Care Plan Review/Shift Note, Individualized Goals, Hospital-Acquired Illness or Injury, Comfort and Wellbeing, and Transition Planning are the \"Overarching Goals\" and should be updated throughout the hospitalization.  Please hover over the (i) for specific information on each goal topic.  Goal: Plan of Care Review  Description: The Plan of Care Review/Shift note should be completed every shift.  The Outcome Evaluation is a brief statement about your assessment that the patient is improving, declining, or no change.  This information will be displayed automatically on your shift note.  Outcome: Progressing  Flowsheets (Taken 11/11/2022 4509)  Outcome Evaluation: Adequacy of intake difficult to determine d/t only eating food from home. Pt reports eating at baseline. Provided handouts, diet ed on crohns, celiac, colostomy nutrition therapy  Plan of Care Reviewed With: patient  Overall Patient Progress: improving     Problem: Nausea and Vomiting  Goal: Nausea and Vomiting Relief  Outcome: Progressing   Goal Outcome Evaluation:      Plan of Care Reviewed With: patient    Overall Patient Progress: improvingOverall Patient Progress: improving    Outcome Evaluation: Adequacy of intake difficult to determine d/t only eating food from home. Pt reports eating at baseline. Provided handouts, diet ed on crohns, celiac, colostomy nutrition therapy      " Addended by: FEDERICO MARCIAL on: 9/10/2019 02:40 PM     Modules accepted: Orders

## 2022-11-23 ENCOUNTER — PES CALL (OUTPATIENT)
Dept: ADMINISTRATIVE | Facility: CLINIC | Age: 82
End: 2022-11-23
Payer: MEDICARE

## 2022-11-30 ENCOUNTER — TELEPHONE (OUTPATIENT)
Dept: INTERNAL MEDICINE | Facility: CLINIC | Age: 82
End: 2022-11-30
Payer: MEDICARE

## 2022-11-30 NOTE — TELEPHONE ENCOUNTER
----- Message from Leidy Chung sent at 11/30/2022  7:33 AM CST -----  Contact: Pt Home [phone]  Patient is calling in regards to her saying that she have been coughing for three days now and the cough is getting worse, and she would like for you to prescribe a script for her and send it to..    [facility]  [address]  [facility] [address]  Phone: [phone] Fax: [phone]

## 2022-12-02 ENCOUNTER — OFFICE VISIT (OUTPATIENT)
Dept: URGENT CARE | Facility: CLINIC | Age: 82
End: 2022-12-02
Payer: MEDICARE

## 2022-12-02 VITALS
TEMPERATURE: 100 F | RESPIRATION RATE: 20 BRPM | DIASTOLIC BLOOD PRESSURE: 67 MMHG | WEIGHT: 235 LBS | HEART RATE: 79 BPM | SYSTOLIC BLOOD PRESSURE: 100 MMHG | BODY MASS INDEX: 35.61 KG/M2 | HEIGHT: 68 IN | OXYGEN SATURATION: 98 %

## 2022-12-02 DIAGNOSIS — J40 BRONCHITIS: Primary | ICD-10-CM

## 2022-12-02 DIAGNOSIS — R06.02 SHORTNESS OF BREATH: ICD-10-CM

## 2022-12-02 DIAGNOSIS — R05.9 COUGH, UNSPECIFIED TYPE: ICD-10-CM

## 2022-12-02 LAB
CTP QC/QA: YES
CTP QC/QA: YES
POC MOLECULAR INFLUENZA A AGN: NEGATIVE
POC MOLECULAR INFLUENZA B AGN: NEGATIVE
SARS-COV-2 AG RESP QL IA.RAPID: NEGATIVE

## 2022-12-02 PROCEDURE — 94640 AIRWAY INHALATION TREATMENT: CPT | Mod: S$GLB,,, | Performed by: NURSE PRACTITIONER

## 2022-12-02 PROCEDURE — 1125F PR PAIN SEVERITY QUANTIFIED, PAIN PRESENT: ICD-10-PCS | Mod: CPTII,S$GLB,, | Performed by: NURSE PRACTITIONER

## 2022-12-02 PROCEDURE — 87811 SARS CORONAVIRUS 2 ANTIGEN POCT, MANUAL READ: ICD-10-PCS | Mod: QW,S$GLB,, | Performed by: NURSE PRACTITIONER

## 2022-12-02 PROCEDURE — 1160F RVW MEDS BY RX/DR IN RCRD: CPT | Mod: CPTII,S$GLB,, | Performed by: NURSE PRACTITIONER

## 2022-12-02 PROCEDURE — 94640 PR INHAL RX, AIRWAY OBST/DX SPUTUM INDUCT: ICD-10-PCS | Mod: S$GLB,,, | Performed by: NURSE PRACTITIONER

## 2022-12-02 PROCEDURE — 3074F SYST BP LT 130 MM HG: CPT | Mod: CPTII,S$GLB,, | Performed by: NURSE PRACTITIONER

## 2022-12-02 PROCEDURE — 87502 INFLUENZA DNA AMP PROBE: CPT | Mod: QW,S$GLB,, | Performed by: NURSE PRACTITIONER

## 2022-12-02 PROCEDURE — 1125F AMNT PAIN NOTED PAIN PRSNT: CPT | Mod: CPTII,S$GLB,, | Performed by: NURSE PRACTITIONER

## 2022-12-02 PROCEDURE — 1160F PR REVIEW ALL MEDS BY PRESCRIBER/CLIN PHARMACIST DOCUMENTED: ICD-10-PCS | Mod: CPTII,S$GLB,, | Performed by: NURSE PRACTITIONER

## 2022-12-02 PROCEDURE — 99213 PR OFFICE/OUTPT VISIT, EST, LEVL III, 20-29 MIN: ICD-10-PCS | Mod: 25,S$GLB,CS, | Performed by: NURSE PRACTITIONER

## 2022-12-02 PROCEDURE — 3074F PR MOST RECENT SYSTOLIC BLOOD PRESSURE < 130 MM HG: ICD-10-PCS | Mod: CPTII,S$GLB,, | Performed by: NURSE PRACTITIONER

## 2022-12-02 PROCEDURE — 1159F MED LIST DOCD IN RCRD: CPT | Mod: CPTII,S$GLB,, | Performed by: NURSE PRACTITIONER

## 2022-12-02 PROCEDURE — 87502 POCT INFLUENZA A/B MOLECULAR: ICD-10-PCS | Mod: QW,S$GLB,, | Performed by: NURSE PRACTITIONER

## 2022-12-02 PROCEDURE — 87811 SARS-COV-2 COVID19 W/OPTIC: CPT | Mod: QW,S$GLB,, | Performed by: NURSE PRACTITIONER

## 2022-12-02 PROCEDURE — 99213 OFFICE O/P EST LOW 20 MIN: CPT | Mod: 25,S$GLB,CS, | Performed by: NURSE PRACTITIONER

## 2022-12-02 PROCEDURE — 71046 XR CHEST PA AND LATERAL: ICD-10-PCS | Mod: S$GLB,,, | Performed by: RADIOLOGY

## 2022-12-02 PROCEDURE — 3078F DIAST BP <80 MM HG: CPT | Mod: CPTII,S$GLB,, | Performed by: NURSE PRACTITIONER

## 2022-12-02 PROCEDURE — 71046 X-RAY EXAM CHEST 2 VIEWS: CPT | Mod: S$GLB,,, | Performed by: RADIOLOGY

## 2022-12-02 PROCEDURE — 1159F PR MEDICATION LIST DOCUMENTED IN MEDICAL RECORD: ICD-10-PCS | Mod: CPTII,S$GLB,, | Performed by: NURSE PRACTITIONER

## 2022-12-02 PROCEDURE — 3078F PR MOST RECENT DIASTOLIC BLOOD PRESSURE < 80 MM HG: ICD-10-PCS | Mod: CPTII,S$GLB,, | Performed by: NURSE PRACTITIONER

## 2022-12-02 RX ORDER — BENZONATATE 200 MG/1
200 CAPSULE ORAL 3 TIMES DAILY PRN
Qty: 30 CAPSULE | Refills: 0 | Status: SHIPPED | OUTPATIENT
Start: 2022-12-02 | End: 2022-12-03 | Stop reason: SDUPTHER

## 2022-12-02 RX ORDER — IPRATROPIUM BROMIDE 0.5 MG/2.5ML
0.5 SOLUTION RESPIRATORY (INHALATION)
Status: COMPLETED | OUTPATIENT
Start: 2022-12-02 | End: 2022-12-02

## 2022-12-02 RX ORDER — ALBUTEROL SULFATE 90 UG/1
2 AEROSOL, METERED RESPIRATORY (INHALATION) EVERY 6 HOURS PRN
Qty: 18 G | Refills: 0 | Status: SHIPPED | OUTPATIENT
Start: 2022-12-02 | End: 2022-12-03 | Stop reason: SDUPTHER

## 2022-12-02 RX ORDER — DOXYCYCLINE 100 MG/1
100 CAPSULE ORAL EVERY 12 HOURS
Qty: 14 CAPSULE | Refills: 0 | Status: SHIPPED | OUTPATIENT
Start: 2022-12-02 | End: 2022-12-03 | Stop reason: SDUPTHER

## 2022-12-02 RX ORDER — ALBUTEROL SULFATE 0.83 MG/ML
2.5 SOLUTION RESPIRATORY (INHALATION)
Status: COMPLETED | OUTPATIENT
Start: 2022-12-02 | End: 2022-12-02

## 2022-12-02 RX ADMIN — ALBUTEROL SULFATE 2.5 MG: 0.83 SOLUTION RESPIRATORY (INHALATION) at 02:12

## 2022-12-02 RX ADMIN — IPRATROPIUM BROMIDE 0.5 MG: 0.5 SOLUTION RESPIRATORY (INHALATION) at 02:12

## 2022-12-02 NOTE — PATIENT INSTRUCTIONS
- You must understand that you have received an Urgent Care treatment only and that you may be released before all of your medical problems are known or treated.   - You, the patient, will arrange for follow up care as instructed.   - If your condition worsens or fails to improve we recommend that you receive another evaluation at the ER immediately or contact your PCP to discuss your concerns.   - You can call [phone] or [phone] to help schedule an appointment with the appropriate provider.    Drink plenty of fluids   Get lots of rest  Tylenol or ibuprofen for pain/fever  Mucinex DM for cough  Saline nasal rinses to irrigate sinus cavities  Warm salt water gargles for sore throat

## 2022-12-02 NOTE — PROGRESS NOTES
"Subjective:       Patient ID: Henrietta Villasenor is a 82 y.o. female.    Vitals:  height is 5' 8" (1.727 m) and weight is 106.6 kg (235 lb). Her temperature is 100 °F (37.8 °C). Her blood pressure is 100/67 and her pulse is 79. Her respiration is 20 and oxygen saturation is 98%.     Chief Complaint: Generalized Body Aches    Pt is an 81 yo female w/ c/o body aches, cough, runny nose for about a week ago. She has been taking cough syrup with some improvement and her inhaler, which is running low. Pt reports trouble hearing and pain in ear. PT has hx of bronchitis and problems breathing    Cough  This is a new problem. The current episode started in the past 7 days. The problem has been gradually worsening. The problem occurs constantly. The cough is Non-productive. Associated symptoms include chills, ear pain, a fever, headaches, shortness of breath, sweats and wheezing. She has tried OTC cough suppressant and ipratropium inhaler for the symptoms. The treatment provided no relief. Her past medical history is significant for bronchitis.     Constitution: Positive for chills and fever.   HENT:  Positive for ear pain.    Respiratory:  Positive for cough, shortness of breath and wheezing.    Neurological:  Positive for headaches.     Objective:      Physical Exam   Constitutional: She is oriented to person, place, and time. She appears well-developed. She is cooperative.  Non-toxic appearance. She does not appear ill. No distress.   HENT:   Head: Normocephalic and atraumatic.   Ears:   Right Ear: Hearing, tympanic membrane, external ear and ear canal normal.   Left Ear: Hearing, tympanic membrane, external ear and ear canal normal.   Nose: Nose normal. No mucosal edema, rhinorrhea or nasal deformity. No epistaxis. Right sinus exhibits no maxillary sinus tenderness and no frontal sinus tenderness. Left sinus exhibits no maxillary sinus tenderness and no frontal sinus tenderness.   Mouth/Throat: Uvula is midline, oropharynx is " clear and moist and mucous membranes are normal. No trismus in the jaw. Normal dentition. No uvula swelling. No oropharyngeal exudate, posterior oropharyngeal edema or posterior oropharyngeal erythema.   Eyes: Conjunctivae and lids are normal. No scleral icterus.   Neck: Trachea normal and phonation normal. Neck supple. No edema present. No erythema present. No neck rigidity present.   Cardiovascular: Normal rate, regular rhythm, normal heart sounds and normal pulses.   Pulmonary/Chest: Effort normal and breath sounds normal. No stridor. No respiratory distress. She has no decreased breath sounds. She has no wheezes. She has no rhonchi. She has no rales.   Persistent cough, increased WOB         Comments: Persistent cough, increased WOB    Abdominal: Normal appearance.   Musculoskeletal: Normal range of motion.         General: No deformity. Normal range of motion.   Neurological: She is alert and oriented to person, place, and time. She exhibits normal muscle tone. Coordination normal.   Skin: Skin is warm, dry, intact, not diaphoretic and not pale.   Psychiatric: Her speech is normal and behavior is normal. Judgment and thought content normal.   Nursing note and vitals reviewed.    Results for orders placed or performed in visit on 12/02/22   POCT Influenza A/B MOLECULAR   Result Value Ref Range    POC Molecular Influenza A Ag Negative Negative, Not Reported    POC Molecular Influenza B Ag Negative Negative, Not Reported     Acceptable Yes    SARS Coronavirus 2 Antigen, POCT Manual Read   Result Value Ref Range    SARS Coronavirus 2 Antigen Negative Negative     Acceptable Yes      *Note: Due to a large number of results and/or encounters for the requested time period, some results have not been displayed. A complete set of results can be found in Results Review.     PRE-TREATMENT:        SpO2%    94         HR 97  Lung Sounds:    POST-TREATMENT:     SpO2%    98         HR 79    Lung  Sounds: Clear BS to auscultation    Pt tolerated breathing treatment well and reports significant improvement.        Assessment:       1. Bronchitis    2. Cough, unspecified type    3. Shortness of breath          Plan:         Bronchitis  -     doxycycline (VIBRAMYCIN) 100 MG Cap; Take 1 capsule (100 mg total) by mouth every 12 (twelve) hours. for 7 days  Dispense: 14 capsule; Refill: 0    Cough, unspecified type  -     POCT Influenza A/B MOLECULAR  -     SARS Coronavirus 2 Antigen, POCT Manual Read  -     XR CHEST PA AND LATERAL; Future; Expected date: 12/02/2022  -     benzonatate (TESSALON) 200 MG capsule; Take 1 capsule (200 mg total) by mouth 3 (three) times daily as needed for Cough.  Dispense: 30 capsule; Refill: 0    Shortness of breath  -     albuterol nebulizer solution 2.5 mg  -     ipratropium 0.02 % nebulizer solution 0.5 mg  -     albuterol (VENTOLIN HFA) 90 mcg/actuation inhaler; Inhale 2 puffs into the lungs every 6 (six) hours as needed for Wheezing. Rescue  Dispense: 18 g; Refill: 0       Patient Instructions   - You must understand that you have received an Urgent Care treatment only and that you may be released before all of your medical problems are known or treated.   - You, the patient, will arrange for follow up care as instructed.   - If your condition worsens or fails to improve we recommend that you receive another evaluation at the ER immediately or contact your PCP to discuss your concerns.   - You can call [phone] or [phone] to help schedule an appointment with the appropriate provider.    Drink plenty of fluids   Get lots of rest  Tylenol or ibuprofen for pain/fever  Mucinex DM for cough  Saline nasal rinses to irrigate sinus cavities  Warm salt water gargles for sore throat

## 2022-12-03 ENCOUNTER — TELEPHONE (OUTPATIENT)
Dept: URGENT CARE | Facility: CLINIC | Age: 82
End: 2022-12-03
Payer: MEDICARE

## 2022-12-03 DIAGNOSIS — R06.02 SHORTNESS OF BREATH: ICD-10-CM

## 2022-12-03 DIAGNOSIS — R05.9 COUGH, UNSPECIFIED TYPE: ICD-10-CM

## 2022-12-03 DIAGNOSIS — J40 BRONCHITIS: ICD-10-CM

## 2022-12-03 RX ORDER — DOXYCYCLINE 100 MG/1
100 CAPSULE ORAL EVERY 12 HOURS
Qty: 14 CAPSULE | Refills: 0 | Status: SHIPPED | OUTPATIENT
Start: 2022-12-03 | End: 2022-12-08

## 2022-12-03 RX ORDER — BENZONATATE 200 MG/1
200 CAPSULE ORAL 3 TIMES DAILY PRN
Qty: 30 CAPSULE | Refills: 0 | Status: SHIPPED | OUTPATIENT
Start: 2022-12-03 | End: 2022-12-13

## 2022-12-03 RX ORDER — ALBUTEROL SULFATE 90 UG/1
2 AEROSOL, METERED RESPIRATORY (INHALATION) EVERY 6 HOURS PRN
Qty: 18 G | Refills: 0 | Status: SHIPPED | OUTPATIENT
Start: 2022-12-03 | End: 2022-12-08

## 2022-12-03 NOTE — TELEPHONE ENCOUNTER
Patient requesting prescriptions be sent to a different pharmacy.  Will send prescriptions that she was prescribed yesterday to pharmacy of choice.

## 2022-12-03 NOTE — TELEPHONE ENCOUNTER
Pt called requesting for medications to be sent to [facility] and [address].

## 2022-12-06 NOTE — TELEPHONE ENCOUNTER
Called and spoke to pt. Pt states she is still having a green productive cough, nasal congestion, fever and chills, body aches. Pt has f/u appt on Thursday. Pt denies CP or SOB. Pt currently takng the doxy and tessalon pearls.

## 2022-12-08 ENCOUNTER — OFFICE VISIT (OUTPATIENT)
Dept: INTERNAL MEDICINE | Facility: CLINIC | Age: 82
End: 2022-12-08
Payer: MEDICARE

## 2022-12-08 VITALS
OXYGEN SATURATION: 99 % | WEIGHT: 221.69 LBS | HEART RATE: 74 BPM | DIASTOLIC BLOOD PRESSURE: 70 MMHG | HEIGHT: 68 IN | BODY MASS INDEX: 33.6 KG/M2 | SYSTOLIC BLOOD PRESSURE: 132 MMHG

## 2022-12-08 DIAGNOSIS — Z87.898 HISTORY OF WHEEZING: ICD-10-CM

## 2022-12-08 DIAGNOSIS — E11.42 TYPE 2 DIABETES MELLITUS WITH DIABETIC POLYNEUROPATHY, WITH LONG-TERM CURRENT USE OF INSULIN: ICD-10-CM

## 2022-12-08 DIAGNOSIS — Z79.4 TYPE 2 DIABETES MELLITUS WITH DIABETIC POLYNEUROPATHY, WITH LONG-TERM CURRENT USE OF INSULIN: ICD-10-CM

## 2022-12-08 DIAGNOSIS — K21.9 GASTROESOPHAGEAL REFLUX DISEASE, UNSPECIFIED WHETHER ESOPHAGITIS PRESENT: ICD-10-CM

## 2022-12-08 DIAGNOSIS — E11.42 DIABETIC POLYNEUROPATHY ASSOCIATED WITH TYPE 2 DIABETES MELLITUS: ICD-10-CM

## 2022-12-08 DIAGNOSIS — Z00.00 ROUTINE GENERAL MEDICAL EXAMINATION AT A HEALTH CARE FACILITY: Primary | ICD-10-CM

## 2022-12-08 PROCEDURE — 99397 PR PREVENTIVE VISIT,EST,65 & OVER: ICD-10-PCS | Mod: S$GLB,,, | Performed by: INTERNAL MEDICINE

## 2022-12-08 PROCEDURE — 1159F MED LIST DOCD IN RCRD: CPT | Mod: CPTII,S$GLB,, | Performed by: INTERNAL MEDICINE

## 2022-12-08 PROCEDURE — 1125F PR PAIN SEVERITY QUANTIFIED, PAIN PRESENT: ICD-10-PCS | Mod: CPTII,S$GLB,, | Performed by: INTERNAL MEDICINE

## 2022-12-08 PROCEDURE — 3078F PR MOST RECENT DIASTOLIC BLOOD PRESSURE < 80 MM HG: ICD-10-PCS | Mod: CPTII,S$GLB,, | Performed by: INTERNAL MEDICINE

## 2022-12-08 PROCEDURE — 3078F DIAST BP <80 MM HG: CPT | Mod: CPTII,S$GLB,, | Performed by: INTERNAL MEDICINE

## 2022-12-08 PROCEDURE — 3288F PR FALLS RISK ASSESSMENT DOCUMENTED: ICD-10-PCS | Mod: CPTII,S$GLB,, | Performed by: INTERNAL MEDICINE

## 2022-12-08 PROCEDURE — 1100F PR PT FALLS ASSESS DOC 2+ FALLS/FALL W/INJURY/YR: ICD-10-PCS | Mod: CPTII,S$GLB,, | Performed by: INTERNAL MEDICINE

## 2022-12-08 PROCEDURE — 99999 PR PBB SHADOW E&M-EST. PATIENT-LVL IV: CPT | Mod: PBBFAC,,, | Performed by: INTERNAL MEDICINE

## 2022-12-08 PROCEDURE — 3288F FALL RISK ASSESSMENT DOCD: CPT | Mod: CPTII,S$GLB,, | Performed by: INTERNAL MEDICINE

## 2022-12-08 PROCEDURE — 3075F SYST BP GE 130 - 139MM HG: CPT | Mod: CPTII,S$GLB,, | Performed by: INTERNAL MEDICINE

## 2022-12-08 PROCEDURE — 99397 PER PM REEVAL EST PAT 65+ YR: CPT | Mod: S$GLB,,, | Performed by: INTERNAL MEDICINE

## 2022-12-08 PROCEDURE — 1125F AMNT PAIN NOTED PAIN PRSNT: CPT | Mod: CPTII,S$GLB,, | Performed by: INTERNAL MEDICINE

## 2022-12-08 PROCEDURE — 1159F PR MEDICATION LIST DOCUMENTED IN MEDICAL RECORD: ICD-10-PCS | Mod: CPTII,S$GLB,, | Performed by: INTERNAL MEDICINE

## 2022-12-08 PROCEDURE — 99999 PR PBB SHADOW E&M-EST. PATIENT-LVL IV: ICD-10-PCS | Mod: PBBFAC,,, | Performed by: INTERNAL MEDICINE

## 2022-12-08 PROCEDURE — 1100F PTFALLS ASSESS-DOCD GE2>/YR: CPT | Mod: CPTII,S$GLB,, | Performed by: INTERNAL MEDICINE

## 2022-12-08 PROCEDURE — 3075F PR MOST RECENT SYSTOLIC BLOOD PRESS GE 130-139MM HG: ICD-10-PCS | Mod: CPTII,S$GLB,, | Performed by: INTERNAL MEDICINE

## 2022-12-08 RX ORDER — AZITHROMYCIN 250 MG/1
TABLET, FILM COATED ORAL
Qty: 6 TABLET | Refills: 0 | Status: SHIPPED | OUTPATIENT
Start: 2022-12-08 | End: 2022-12-27

## 2022-12-08 RX ORDER — PANTOPRAZOLE SODIUM 40 MG/1
40 TABLET, DELAYED RELEASE ORAL DAILY
Qty: 90 TABLET | Refills: 1 | Status: SHIPPED | OUTPATIENT
Start: 2022-12-08 | End: 2023-06-21

## 2022-12-08 RX ORDER — OXYCODONE AND ACETAMINOPHEN 10; 325 MG/1; MG/1
1 TABLET ORAL
Qty: 150 TABLET | Refills: 0 | Status: SHIPPED | OUTPATIENT
Start: 2022-12-08 | End: 2022-12-08 | Stop reason: SDUPTHER

## 2022-12-08 RX ORDER — OXYCODONE AND ACETAMINOPHEN 10; 325 MG/1; MG/1
1 TABLET ORAL
Qty: 150 TABLET | Refills: 0 | Status: SHIPPED | OUTPATIENT
Start: 2023-02-08 | End: 2023-01-06 | Stop reason: SDUPTHER

## 2022-12-08 RX ORDER — DULOXETIN HYDROCHLORIDE 60 MG/1
60 CAPSULE, DELAYED RELEASE ORAL 2 TIMES DAILY
Qty: 180 CAPSULE | Refills: 1 | Status: ON HOLD | OUTPATIENT
Start: 2022-12-08 | End: 2023-04-20 | Stop reason: SDUPTHER

## 2022-12-08 RX ORDER — ALBUTEROL SULFATE 90 UG/1
2 AEROSOL, METERED RESPIRATORY (INHALATION) EVERY 4 HOURS PRN
Qty: 8.5 G | Refills: 1 | Status: SHIPPED | OUTPATIENT
Start: 2022-12-08 | End: 2023-03-08 | Stop reason: SDUPTHER

## 2022-12-08 RX ORDER — OXYCODONE AND ACETAMINOPHEN 10; 325 MG/1; MG/1
1 TABLET ORAL
Qty: 150 TABLET | Refills: 0 | Status: SHIPPED | OUTPATIENT
Start: 2023-02-08 | End: 2022-12-08 | Stop reason: SDUPTHER

## 2022-12-08 RX ORDER — OXYCODONE AND ACETAMINOPHEN 10; 325 MG/1; MG/1
1 TABLET ORAL
Qty: 150 TABLET | Refills: 0 | Status: SHIPPED | OUTPATIENT
Start: 2023-01-06 | End: 2022-12-08 | Stop reason: SDUPTHER

## 2022-12-08 RX ORDER — OXYCODONE AND ACETAMINOPHEN 10; 325 MG/1; MG/1
1 TABLET ORAL
Qty: 150 TABLET | Refills: 0 | Status: SHIPPED | OUTPATIENT
Start: 2023-01-06 | End: 2023-03-08 | Stop reason: SDUPTHER

## 2022-12-08 RX ORDER — OXYCODONE AND ACETAMINOPHEN 10; 325 MG/1; MG/1
1 TABLET ORAL
Qty: 150 TABLET | Refills: 0 | Status: SHIPPED | OUTPATIENT
Start: 2022-12-08 | End: 2023-03-08 | Stop reason: SDUPTHER

## 2022-12-08 NOTE — PROGRESS NOTES
CHIEF COMPLAINT:  Annual exam and Follow up of multple problems.      HISTORY OF PRESENT ILLNESS: This is a 82-year-old woman who presents for follow up of above     She has had a cough for the last 2 weeks. She went to urgent care on 12/2/2022.  She reports that she was only given cough pills to take 3 times daily. She was prescribed antibiotics, doxycycline, but she states she has not gotten this prescripition.   She has a cough with thick green mucous.   She mild shortness of breath.           She continues to have neck and shoulder pain, back pain. She complains of back pain and body pain  which has been severe. She is taking oxycodone apap 10/325 up to 5 times daily.   She continues to take duloxetine 60 mg twice daily.  Her mood is doing ok.        She saw Dr Reilly on 1/18/22 - she continues to take Eliquis 5 mg wice daily.  She had cardiac PET stress 2/9/2022 which was negative for ischemia          No fever or chills. She has felt cold.  No nausea, vomiting, constipation, dysuria, hematuira.  She denies swallowing problems today.        She is living alone. She does not see her grandson very  much. She has a niece, Kacie Mast who lives locally.      She is taking  amlodipine 10 mg 1 daily, coreg 25 mg twice daily and lasix 40 mg 1/2  tablet once daily for her hypertension and diastolic dysfunction.     Ela is taking requipo 0.5  mg at bedtime - not helping the restless leg     She saw Endocrinology on 10/22/20. She should take Lantus 60 units daily and Novolog 24 units once or twice daily with meals.  Blood sugar has been up and down. 125-200.  Weight has been stable.       PAST MEDICAL HISTORY:   1. Diabetes mellitus   2. Hypertension   3. Hyperlipidemia   4. Left heart catheterization January 2007 which revealed luminal irregularities in the LAD, left circumflex and right coronary artery. She had diastolic dysfunction and patent renal arteries.   5. Sleep apnea   6. Obesity.   7. Nephrolithiasis status  "post lithotripsy.   8. Reflux - had nonerosive gastropathy on EGD September 2007. Gastritis on EGD 9/2010  9. Hidradenitis suppurativa.   10. History of diverticulitis with a hospitalized October 2007.   11. Migraines   12. Obesity.   13. 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    14. Total hysterectomy in 1969.   15. Cholecystectomy was done at that time as well.   16. 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   17. Colon polyps on colonscopy 11/2010 - due 2013   18. Hospitalization 12/10 for e coli sepsis due to left ureteral stone with left nephrostomy tube in [place], [place]   19. Left knee replacement 9/2012      MEDICATIONS and ALLERGIES: Updated on EPIC.     PHYSICAL EXAMINATION:        /70 (BP Location: Left arm, Patient Position: Sitting)   Pulse 74   Ht 5' 8" (1.727 m)   Wt 100.5 kg (221 lb 10.8 oz)   LMP  (LMP Unknown)   SpO2 99%   BMI 33.71 kg/m²      GENERAL: She is alert, oriented, no apparent distress. Affect within   normal limits.  Conjunctiva anicteric.    NECK: Supple.   Respiratory: Effort normal. Lungs clear  HEART: Regular rate and rhythm without murmurs, gallops or rubs.   No lower extremity edema.        labs 3/17/22 reviewed     ASSESSMENT AND PLAN:      1. Hypertension with diastolic heart failure- stable. Negative PET stress.  stable on amlodipine to 10 mg  one tablet daily and continue furosemide 40 mg 1/2 tablet daily. Continue carvedilol 25 mg one tablet twice daily  2.  OA neck and lumbar spine - on oxycodone. stable  3.  Gout - on allopurinol.   4.  Mood disorder -  on duloxetine 60 mg twice daily  5.  Swallowing disorder - s/p EGD with dilation on 11/11/21.   6. Asthma - stable   7. Gerd -should be taking on pantoprazole 40 mg  daily - refilled.   8. Diabetes with neuropathy- diabetes education  9. Hyperlipidemia - off  lipitor  10. Allergic rhinitis - stable  12. CRI - stable  13. Obesity - " discussed diet, exercise and weight loss  14. CAD -risk factor modification  15. Aortic atherosclerosis and possible mesenteric artery stenosis- risk factor modification  16. Tortuous aorta - HTN controlled  17. Colon polyps - Colonoscopy in 8/2013 - 2 polyps. Flex sig 11/2013 - mild granular mucosa. Colonoscopy 6/2017 diverticulosis - no polyps due 2023.   18. hyperparathryodisism -saw nephrology   19. .Restless leg syndrome - on roprinole    20. Morbid obestiy - working on diet.   21 Bronchitis - z pack     .  MMG 7/22        I will see her back in 3 month sooner if problems arise

## 2022-12-19 ENCOUNTER — TELEPHONE (OUTPATIENT)
Dept: DIABETES | Facility: CLINIC | Age: 82
End: 2022-12-19
Payer: MEDICARE

## 2022-12-20 ENCOUNTER — TELEPHONE (OUTPATIENT)
Dept: INTERNAL MEDICINE | Facility: CLINIC | Age: 82
End: 2022-12-20
Payer: MEDICARE

## 2022-12-20 NOTE — TELEPHONE ENCOUNTER
----- Message from Leidy Chung sent at 12/20/2022 12:48 PM CST -----  Contact: Pt Mobile [phone]  Patients daughter Ms. Burroughs is calling in regards to her saying that she would like to come in on today to have her mother's FMLA paperwork filled out, and she would like for you to let her know if it okay for her to come in please?

## 2022-12-21 NOTE — TELEPHONE ENCOUNTER
----- Message from Cierra Neri sent at 12/21/2022 10:48 AM CST -----  Contact: [phone]  Patients daughter Ms. Burroughs is calling in regards to her saying that she would like to come in on today to have her mother's FMLA paperwork filled out, and she would like for you to let her know if it okay for her to come in please?

## 2022-12-23 ENCOUNTER — TELEPHONE (OUTPATIENT)
Dept: ADMINISTRATIVE | Facility: CLINIC | Age: 82
End: 2022-12-23
Payer: MEDICARE

## 2022-12-27 ENCOUNTER — OFFICE VISIT (OUTPATIENT)
Dept: INTERNAL MEDICINE | Facility: CLINIC | Age: 82
End: 2022-12-27
Payer: MEDICARE

## 2022-12-27 VITALS
WEIGHT: 224.19 LBS | DIASTOLIC BLOOD PRESSURE: 80 MMHG | BODY MASS INDEX: 34.09 KG/M2 | SYSTOLIC BLOOD PRESSURE: 148 MMHG | OXYGEN SATURATION: 97 % | HEART RATE: 93 BPM

## 2022-12-27 DIAGNOSIS — Z99.89 DEPENDENCE ON OTHER ENABLING MACHINES AND DEVICES: ICD-10-CM

## 2022-12-27 DIAGNOSIS — I70.0 AORTIC ATHEROSCLEROSIS: ICD-10-CM

## 2022-12-27 DIAGNOSIS — J30.9 ALLERGIC RHINITIS, UNSPECIFIED SEASONALITY, UNSPECIFIED TRIGGER: ICD-10-CM

## 2022-12-27 DIAGNOSIS — Z79.4 TYPE 2 DIABETES MELLITUS WITH DIABETIC POLYNEUROPATHY, WITH LONG-TERM CURRENT USE OF INSULIN: ICD-10-CM

## 2022-12-27 DIAGNOSIS — E11.42 TYPE 2 DIABETES MELLITUS WITH DIABETIC POLYNEUROPATHY, WITH LONG-TERM CURRENT USE OF INSULIN: ICD-10-CM

## 2022-12-27 DIAGNOSIS — H91.90 DECREASED HEARING, UNSPECIFIED LATERALITY: ICD-10-CM

## 2022-12-27 DIAGNOSIS — I73.9 PAD (PERIPHERAL ARTERY DISEASE): ICD-10-CM

## 2022-12-27 DIAGNOSIS — G89.4 CHRONIC PAIN DISORDER: ICD-10-CM

## 2022-12-27 DIAGNOSIS — R41.3 MEMORY CHANGES: ICD-10-CM

## 2022-12-27 DIAGNOSIS — Z00.00 ENCOUNTER FOR PREVENTIVE HEALTH EXAMINATION: Primary | ICD-10-CM

## 2022-12-27 DIAGNOSIS — M48.062 SPINAL STENOSIS OF LUMBAR REGION WITH NEUROGENIC CLAUDICATION: ICD-10-CM

## 2022-12-27 DIAGNOSIS — Z91.81 AT RISK FOR FALLS: ICD-10-CM

## 2022-12-27 DIAGNOSIS — Z59.819 HOUSING INSTABILITY: ICD-10-CM

## 2022-12-27 DIAGNOSIS — F39 MOOD DISORDER: ICD-10-CM

## 2022-12-27 PROCEDURE — 1170F FXNL STATUS ASSESSED: CPT | Mod: HCNC,CPTII,S$GLB, | Performed by: NURSE PRACTITIONER

## 2022-12-27 PROCEDURE — 3079F PR MOST RECENT DIASTOLIC BLOOD PRESSURE 80-89 MM HG: ICD-10-PCS | Mod: HCNC,CPTII,S$GLB, | Performed by: NURSE PRACTITIONER

## 2022-12-27 PROCEDURE — G0439 PR MEDICARE ANNUAL WELLNESS SUBSEQUENT VISIT: ICD-10-PCS | Mod: HCNC,S$GLB,, | Performed by: NURSE PRACTITIONER

## 2022-12-27 PROCEDURE — G9919 SCRN ND POS ND PROV OF REC: HCPCS | Mod: HCNC,CPTII,S$GLB, | Performed by: NURSE PRACTITIONER

## 2022-12-27 PROCEDURE — 1170F PR FUNCTIONAL STATUS ASSESSED: ICD-10-PCS | Mod: HCNC,CPTII,S$GLB, | Performed by: NURSE PRACTITIONER

## 2022-12-27 PROCEDURE — 3079F DIAST BP 80-89 MM HG: CPT | Mod: HCNC,CPTII,S$GLB, | Performed by: NURSE PRACTITIONER

## 2022-12-27 PROCEDURE — G0439 PPPS, SUBSEQ VISIT: HCPCS | Mod: HCNC,S$GLB,, | Performed by: NURSE PRACTITIONER

## 2022-12-27 PROCEDURE — 1100F PR PT FALLS ASSESS DOC 2+ FALLS/FALL W/INJURY/YR: ICD-10-PCS | Mod: HCNC,CPTII,S$GLB, | Performed by: NURSE PRACTITIONER

## 2022-12-27 PROCEDURE — G9919 PR SCREENING AND POSITIVE: ICD-10-PCS | Mod: HCNC,CPTII,S$GLB, | Performed by: NURSE PRACTITIONER

## 2022-12-27 PROCEDURE — 99999 PR PBB SHADOW E&M-EST. PATIENT-LVL IV: CPT | Mod: PBBFAC,HCNC,, | Performed by: NURSE PRACTITIONER

## 2022-12-27 PROCEDURE — 3077F SYST BP >= 140 MM HG: CPT | Mod: HCNC,CPTII,S$GLB, | Performed by: NURSE PRACTITIONER

## 2022-12-27 PROCEDURE — 99999 PR PBB SHADOW E&M-EST. PATIENT-LVL IV: ICD-10-PCS | Mod: PBBFAC,HCNC,, | Performed by: NURSE PRACTITIONER

## 2022-12-27 PROCEDURE — 3288F PR FALLS RISK ASSESSMENT DOCUMENTED: ICD-10-PCS | Mod: HCNC,CPTII,S$GLB, | Performed by: NURSE PRACTITIONER

## 2022-12-27 PROCEDURE — 3077F PR MOST RECENT SYSTOLIC BLOOD PRESSURE >= 140 MM HG: ICD-10-PCS | Mod: HCNC,CPTII,S$GLB, | Performed by: NURSE PRACTITIONER

## 2022-12-27 PROCEDURE — 3288F FALL RISK ASSESSMENT DOCD: CPT | Mod: HCNC,CPTII,S$GLB, | Performed by: NURSE PRACTITIONER

## 2022-12-27 PROCEDURE — 1100F PTFALLS ASSESS-DOCD GE2>/YR: CPT | Mod: HCNC,CPTII,S$GLB, | Performed by: NURSE PRACTITIONER

## 2022-12-27 RX ORDER — CARVEDILOL 25 MG/1
25 TABLET ORAL 2 TIMES DAILY
Qty: 60 TABLET | Refills: 2 | Status: SHIPPED | OUTPATIENT
Start: 2022-12-27 | End: 2023-01-26 | Stop reason: SDUPTHER

## 2022-12-27 RX ORDER — LORATADINE 10 MG/1
10 TABLET ORAL DAILY
Qty: 30 TABLET | Refills: 4 | Status: SHIPPED | OUTPATIENT
Start: 2022-12-27 | End: 2023-01-17 | Stop reason: SDUPTHER

## 2022-12-27 SDOH — SOCIAL DETERMINANTS OF HEALTH (SDOH): HOUSING INSTABILITY UNSPECIFIED: Z59.819

## 2022-12-27 NOTE — PATIENT INSTRUCTIONS
Counseling and Referral of Other Preventative  (Italic type indicates deductible and co-insurance are waived)    Patient Name: Henrietta Villasenor  Today's Date: 12/27/2022    Health Maintenance       Date Due Completion Date    Shingles Vaccine (1 of 2) Never done ---    Urine Drug Screen 12/05/2018 6/5/2018 (Declined)    Override on 6/5/2018: Declined    Naloxone Prescription 06/05/2019 6/5/2018 (Declined)    Override on 6/5/2018: Declined    Diabetes Urine Screening 05/19/2022 5/19/2021    Lipid Panel 05/19/2022 5/19/2021    Eye Exam 05/27/2022 5/27/2021    Override on 12/5/2018: Done    Colonoscopy 06/14/2022 6/14/2017    Override on 11/2/2010: Done (done per Chris Storey due 11/02/2013)    COVID-19 Vaccine (5 - Booster for Pfizer series) 07/24/2022 5/29/2022    Hemoglobin A1c 12/09/2022 9/9/2022    DEXA Scan 07/09/2023 7/9/2020    Override on 11/2/2011: Done (no repeat bmd required)    TETANUS VACCINE 03/20/2024 3/20/2014        Orders Placed This Encounter   Procedures    Microalbumin/Creatinine Ratio, Urine    Lipid Panel    Hemoglobin A1C    Ambulatory referral/consult to Outpatient Case Management    Ambulatory referral/consult to Optometry    Ambulatory referral/consult to Audiology     The following information is provided to all patients.  This information is to help you find resources for any of the problems found today that may be affecting your health:                Living healthy guide: www.[facility].[place].gov      Understanding Diabetes: www.diabetes.org      Eating healthy: www.cdc.gov/healthyweight      CDC home safety checklist: www.cdc.gov/steadi/patient.html      Agency on Aging: www.goea.[place].gov      Alcoholics anonymous (AA): www.aa.org      Physical Activity: www.felix.nih.gov/fl2zdua      Tobacco use: www.quitwithusla.org

## 2022-12-27 NOTE — PROGRESS NOTES
Henrietta Villasenor presented for a  Medicare AWV and comprehensive Health Risk Assessment today. The following components were reviewed and updated:    Medical history  Family History  Social history  Allergies and Current Medications  Health Risk Assessment  Health Maintenance  Care Team     Patient screened moderate and/or high risk for one or more social determinants of health (SDOH). Patient connected to community resources through the ED Navigator.      ** See Completed Assessments for Annual Wellness Visit within the encounter summary.**         The following assessments were completed:  Living Situation  CAGE  Depression Screening  Timed Get Up and Go - not done due to mobility impairment  Whisper Test  Cognitive Function Screening  Nutrition Screening  ADL Screening  PAQ Screening          Vitals:    12/27/22 1554   BP: (!) 148/80   Pulse: 93   SpO2: 97%   Weight: 101.7 kg (224 lb 3.3 oz)     Body mass index is 34.09 kg/m².  Physical Exam  Vitals and nursing note reviewed.   Constitutional:       Appearance: She is well-developed.   HENT:      Head: Normocephalic and atraumatic.      Right Ear: External ear normal.      Left Ear: External ear normal.      Nose: Nose normal.   Eyes:      Pupils: Pupils are equal, round, and reactive to light.   Cardiovascular:      Rate and Rhythm: Normal rate and regular rhythm.      Heart sounds: Normal heart sounds.   Pulmonary:      Effort: Pulmonary effort is normal.      Breath sounds: Normal breath sounds.   Abdominal:      General: Bowel sounds are normal.      Palpations: Abdomen is soft.   Musculoskeletal:         General: Normal range of motion.      Cervical back: Normal range of motion.   Skin:     General: Skin is warm and dry.   Neurological:      Mental Status: She is alert. Mental status is at baseline.   Psychiatric:         Behavior: Behavior normal.           Diagnoses and health risks identified today and associated recommendations/orders:    1. Encounter for  preventive health examination  Health Maintenance updated   Records reviewed   Exam done     2. Aortic atherosclerosis  Stable and chronic. Continue current medications. Followed by PCP.     3. Housing instability  - Ambulatory referral/consult to Outpatient Case Management    4. Type 2 diabetes mellitus with diabetic polyneuropathy, with long-term current use of insulin  - Ambulatory referral/consult to Optometry; Future  - Microalbumin/Creatinine Ratio, Urine; Future  - Lipid Panel; Future  - Hemoglobin A1C; Future    5. Decreased hearing, unspecified laterality  - Ambulatory referral/consult to Audiology; Future    6. Dependence on other enabling machines and devices  Stable and chronic. Followed by PCP.    7. Memory changes  Abnormal cognitive function screening.   - Ambulatory referral/consult to Neurology; Future    8. Mood disorder  Stable and chronic. Continue current medications. Followed by PCP.     9. PAD (peripheral artery disease)  Stable and chronic. Continue current medications. Followed by PCP.     10. Allergic rhinitis  - loratadine (CLARITIN) 10 mg tablet; Take 1 tablet (10 mg total) by mouth once daily.  Dispense: 30 tablet; Refill: 4    11. At risk for falls  - Ambulatory referral/consult to Physical/Occupational Therapy; Future    12. Spinal stenosis of lumbar region with neurogenic claudication  Stable and chronic. Continue current medications. Followed by PCP.     13. Chronic pain disorder  Stable and chronic. Continue current medications. Followed by PCP.       Counseling and Referral of Other Preventative  (Italic type indicates deductible and co-insurance are waived)    Patient Name: Henrietta Villasenor  Today's Date: 12/28/2022    Health Maintenance         Date Due Completion Date    Shingles Vaccine (1 of 2) Never done ---    Urine Drug Screen 12/05/2018 6/5/2018 (Declined)    Override on 6/5/2018: Declined    Naloxone Prescription 06/05/2019 6/5/2018 (Declined)    Override on 6/5/2018:  Declined    Diabetes Urine Screening 05/19/2022 5/19/2021    Lipid Panel 05/19/2022 5/19/2021    Eye Exam 05/27/2022 5/27/2021    Override on 12/5/2018: Done    Colonoscopy 06/14/2022 6/14/2017    Override on 11/2/2010: Done (done per Chris Storey due 11/02/2013)    COVID-19 Vaccine (5 - Booster for Pfizer series) 07/24/2022 5/29/2022    Hemoglobin A1c 12/09/2022 9/9/2022    DEXA Scan 07/09/2023 7/9/2020    Override on 11/2/2011: Done (no repeat bmd required)    TETANUS VACCINE 03/20/2024 3/20/2014          Orders Placed This Encounter   Procedures    Microalbumin/Creatinine Ratio, Urine    Lipid Panel    Hemoglobin A1C    Ambulatory referral/consult to Outpatient Case Management    Ambulatory referral/consult to Optometry    Ambulatory referral/consult to Audiology    Ambulatory referral/consult to Neurology    Ambulatory referral/consult to Physical/Occupational Therapy     Provided Henrietta with a 5-10 year written screening schedule and personal prevention plan. Recommendations were developed using the USPSTF age appropriate recommendations. Education, counseling, and referrals were provided as needed. After Visit Summary printed and given to patient which includes a list of additional screenings\tests needed.    Follow up in about 2 months (around 3/8/2023) for Follow up with PCP.    Kamila Melton, NP  I offered to discuss advanced care planning, including how to pick a person who would make decisions for you if you were unable to make them for yourself, called a health care power of , and what kind of decisions you might make such as use of life sustaining treatments such as ventilators and tube feeding when faced with a life limiting illness recorded on a living will that they will need to know. (How you want to be cared for as you near the end of your natural life)     X Patient is interested in learning more about how to make advanced directives.  I provided them paperwork and offered to discuss  this with them.  Review for Opioid Screening: Pt does have Rx for Opioids    Patient is followed by PCP. Patient educated on non-pharmacological interventions for pain. Patient evaluated for pain severity and current treatment plan. No referral to specialist is needed.    Review for Substance Use Disorders: Patient does not use substance

## 2022-12-28 ENCOUNTER — OUTPATIENT CASE MANAGEMENT (OUTPATIENT)
Dept: ADMINISTRATIVE | Facility: OTHER | Age: 82
End: 2022-12-28
Payer: MEDICARE

## 2022-12-28 NOTE — PROGRESS NOTES
Outpatient Care Management  Initial Patient Assessment    Patient: Henrietta Villasenor  MRN: [id number]  Date of Service: 12/28/2022  Completed by: Jamee Toth RN  Referral Date: 12/27/2022  Program: High Risk  Status: Ongoing  Effective Dates: 12/29/2022 - present  Responsible Staff: Jamee Toth RN        Reason for Visit   Patient presents with    OPCM Chart Review     12/28/22    OPCM RN First Assessment Attempt     12/28/22    OPCM RN Second Assessment Attempt     12/29/22    OPCM Enrollment Call     12/29/22    PHQ-9     12/29/22    OPCM Welcome Letter     12/29/22    Plan Of Care     12/29/22       Brief Summary:  Henrietta Villasenor was referred by Kamila Melton NP for Housing instability. Patient qualifies for program based on risk score of 88%.   Active problem list, medical, surgical and social history reviewed. Active comorbidities include Diabetes, CHF, HTN, and CAD. Areas of need identified by patient include education on diet, financial assistance with electricity bill, information on senior centers, and help with homemaker services. Referral have been placed to [facility] . Complex care plan created with patient/caregiver input. By next encounter, patient agrees to read through education on CHF and diet.     Next steps: Follow up on or around 1/5/23.    12/29/22 2nd attempt to complete initial assessment for Outpatient Care Management: Voicemail did not . Will attempt to contact patient again at a later date.     12/28/22 1st attempt to complete initial assessment for [facility]: Left message for patient requesting a return call. Letter has been sent to patient with [facility] contact information. Will attempt to contact patient again at a later date.

## 2022-12-28 NOTE — LETTER
December 28, 2022    Henrietta Villasenor  [address] [address]             [facility] [address] [place] [address] Dear Ms. Henrietta Hamilton,    I work with [facility] Outpatient Case Management Department. We received a referral to call you to discuss your medical history. These services are free of charge and are offered to [facility] patients who have recently been discharged from any of our facilities or who have complex medical conditions that may require the skill of a nurse to assist with management.    I am a Registered Nurse who specializes in connecting patients with available medical and financial resources as well as addressing any educational needs that may be indicated.    I attempted to reach you by telephone, but I was unsuccessful. Please call our department so that we can go over some questions with you regarding your health.    The Outpatient Case Management Department can be reached at [phone] from 8:00 AM to 4:30 PM on Monday through Friday. [facility] also has a program where a nurse is available 24/7 to answer questions or provide medical advice. Their number is [phone].      Kind Regards,    Jamee Toth, RN  Outpatient Case Management  [phone]

## 2022-12-28 NOTE — LETTER
December 29, 2022           Dear Ms. Henrietta Villasenor,     Welcome to [facility]s Complex Care Management Program.  It was a pleasure talking with you today.  My name is Jamee Toth. I look forward to working with you as your .  My goal is to help you function at the healthiest and highest level possible.  You can contact me directly at [phone].    As an [facility] patient with [employer], some of the services we may be able to provide include:      Development of an individualized care plan with a Registered Nurse    Connection with a    Connection with available resources and services     Coordinate communication among your care team members    Provide coaching and education    Help you understand your doctors treatment plan   Help you obtain information about your insurance coverage.     All services provided by [facility]s Complex Care Managers and other care team members are coordinated with and communicated to your primary care team.    As part of your enrollment, you will be receiving education materials and more information about these services in your [facility] account, by phone or through the mail.  If you do not wish to participate or receive information, please contact our office at [phone].      Sincerely,        Jamee Toth RN  [facility]   Out-patient RN Complex Care Manager

## 2022-12-30 ENCOUNTER — CLINICAL SUPPORT (OUTPATIENT)
Dept: DIABETES | Facility: CLINIC | Age: 82
End: 2022-12-30
Payer: MEDICARE

## 2022-12-30 VITALS — WEIGHT: 224 LBS | BODY MASS INDEX: 34.06 KG/M2

## 2022-12-30 DIAGNOSIS — E11.42 TYPE 2 DIABETES MELLITUS WITH DIABETIC POLYNEUROPATHY, WITH LONG-TERM CURRENT USE OF INSULIN: ICD-10-CM

## 2022-12-30 DIAGNOSIS — Z79.4 TYPE 2 DIABETES MELLITUS WITH DIABETIC POLYNEUROPATHY, WITH LONG-TERM CURRENT USE OF INSULIN: ICD-10-CM

## 2022-12-30 PROCEDURE — 99999 PR PBB SHADOW E&M-EST. PATIENT-LVL II: CPT | Mod: PBBFAC,HCNC,,

## 2022-12-30 PROCEDURE — 99999 PR PBB SHADOW E&M-EST. PATIENT-LVL II: ICD-10-PCS | Mod: PBBFAC,HCNC,,

## 2022-12-30 PROCEDURE — G0108 DIAB MANAGE TRN  PER INDIV: HCPCS | Mod: HCNC,S$GLB,, | Performed by: INTERNAL MEDICINE

## 2022-12-30 PROCEDURE — G0108 PR DIAB MANAGE TRN  PER INDIV: ICD-10-PCS | Mod: HCNC,S$GLB,, | Performed by: INTERNAL MEDICINE

## 2022-12-30 NOTE — PROGRESS NOTES
Diabetes Care Specialist Progress Note  Author: Elena Yates RN  Date: 12/30/2022    Program Intake  Reason for Diabetes Program Visit:: Initial Diabetes Assessment  Current diabetes risk level:: high  In the last 12 months, have you:: none    Lab Results   Component Value Date    HGBA1C 8.6 (H) 09/09/2022       Clinical    Patient Health Rating  Compared to other people your age, how would you rate your health?: Poor    Problem Review  Reviewed Problem List with Patient: yes  Active comorbidities affecting diabetes self-care.: yes  Comorbidities: Chronic Kidney Disease, Neuropathy, Cardiovascular Disease, Hypertension  Reviewed health maintenance: yes    Clinical Assessment  Current Diabetes Treatment: Diet, Injectable, Exercise, Insulin  Have you ever experienced hypoglycemia (low blood sugar)?: yes  In the last month, how often have you experienced low blood sugar?: more than once a week  Are you able to tell when your blood sugar is low?: Yes  What symptoms do you experience?: Sweaty  Have you ever been hospitalized because your blood sugar was too low?: no  How do you treat hypoglycemia (low blood sugar)?: 5-6 pieces of hard candy  Have you ever experienced hyperglycemia (high blood sugar)?: yes  In the last month, how often have you experienced high blood sugar?: more than once a day  Are you able to tell when your blood sugar is high?: No (comment)  Have you ever been hospitalized because your blood sugar was high?: no    Medication Information  How do you obtain your medications?: Patient drives  How many days a week do you miss your medications?:  (takes meal time insulin once a day - usually eats once a day)  Medication adherence impacting ability to self-manage diabetes?: Yes    Labs  Do you have regular lab work to monitor your medications?: Yes  Type of Regular Lab Work: A1c, CBC, BMP  Where do you get your labs drawn?: [facility]  Lab Compliance Barriers: No    Nutritional Status  Diet: Regular  Meal  Plan 24 Hour Recall: Breakfast, Lunch, Dinner, Snack  Meal Plan 24 Hour Recall - Breakfast: none  Meal Plan 24 Hour Recall - Lunch: fried chicken, french fries  Meal Plan 24 Hour Recall - Dinner: fried chicken, french fries  Meal Plan 24 Hour Recall - Snack: apple turnovers, cinnamon rolls   drinks water, juice,diet coke  Change in appetite?: No  Recent Changes in Weight: No Recent Weight Change  Current nutritional status an area of need that is impacting patient's ability to self-manage diabetes?: Yes    Additional Social History    Support  Does anyone support you with your diabetes care?: yes  Who supports you?: family member  Who takes you to your medical appointments?: self  Does the current support meet the patient's needs?: Yes  Is Support an area impacting ability to self-manage diabetes?: No         Cognitive/Behavioral Health  Alert and Oriented: Yes  Requires Prompting: No  Cognitive or behavioral barriers impacting ability to self-manage diabetes?: No    Culture/Anglican  Culture or Anglican beliefs that may impact ability to access healthcare: No    Communication  Language preference: English  Hearing Problems: Yes  Vision Problems: Yes  Vision problem type:: Decreased Vision  Vision Assistance: Glasses    Health Literacy  Preferred Learning Method: Face to Face, Demonstration, Reading Materials  How often do you need to have someone help you read instructions, pamphlets, or written material from your doctor or pharmacy?: Never  Health literacy needs impacting ability to self-manage diabetes?: No      Diabetes Self-Management Skills Assessment    Diabetes Disease Process/Treatment Options  Patient/caregiver able to state what happens when someone has diabetes.: no  Patient/caregiver knows what type of diabetes they have.: yes  Diabetes Type : Type II  Patient/caregiver able to identify at least three signs and symptoms of diabetes.: no  Patient able to identify at least three risk factors for  diabetes.: no  Diabetes Disease Process/Treatment Options: Skills Assessment Completed: Yes  Assessment indicates:: Knowledge deficit, Instruction Needed  Area of need?: Yes    Nutrition/Healthy Eating  Challenges to healthy eating:: snacking between meals and at night  Method of carbohydrate measurement:: no method  Patient can identify foods that impact blood sugar.: no (see comments)  Nutrition/Healthy Eating Skills Assessment Completed:: Yes  Assessment indicates:: Knowledge deficit, Instruction Needed  Area of need?: Yes    Physical Activity/Exercise  Patient's daily activity level:: sedentary  Patient formally exercises outside of work.: no  Reasons for not exercising:: physically unable to exercise currently  Patient can identify forms of physical activity.: no  Patient can identify reasons why exercise/physical activity is important in diabetes management.: no  Physical Activity/Exercise Skills Assessment Completed: : Yes  Assessment indicates:: Knowledge deficit, Instruction Needed  Area of need?: Yes    Medications  Patient is able to describe current diabetes management routine.: no  Patient is able to identify current diabetes medications, dosages, and appropriate timing of medications.: no  Patient understands the purpose of the medications taken for diabetes.: no  Patient reports problems or concerns with current medication regimen.: no  Medication Skills Assessment Completed:: Yes  Assessment indicates:: Knowledge deficit  Area of need?: Yes    Home Blood Glucose Monitoring  Patient states that blood sugar is checked at home daily.: yes  Monitoring Method:: home glucometer  How often do you check your blood sugar?: 4 times a day  When do you check your blood sugar?: Before breakfast, Before lunch, Before dinner, Before bedtime  When you check what is your typical blood sugar range? : 120-200  Blood glucose logs:: no  Blood glucose logs reviewed today?: no  Home Blood Glucose Monitoring Skills  Assessment Completed: : Yes  Assessment indicates:: Knowledge deficit, Instruction Needed  Area of need?: Yes    Acute Complications  Patient is able to identify types of acute complications: No  Acute Complications Skills Assessment Completed: : Yes  Assessment indicates:: Knowledge deficit, Instruction Needed  Area of need?: Yes    Chronic Complications  Patient can identify major chronic complications of diabetes.: no  Patient can identify ways to prevent or delay diabetes complications.: no  Patient is aware that having diabetes increases risk of heart disease?: No  Patient is aware that heart disease is the leading cause of death and disability in people with diabetes?: No  Patient able to state risk factors for heart disease?: No  Patient is taking statin?: No  Has your doctor talked to you about Statin use?: No  Do you want more information on Statins?: No  Chronic Complications Skills Assessment Completed: : Yes  Assessment indicates:: Knowledge deficit, Instruction Needed  Area of need?: Yes    Psychosocial/Coping  Patient can identify ways of coping with chronic disease.: yes  Psychosocial/Coping Skills Assessment Completed: : Yes  Assessment indicates:: Knowledge deficit, Instruction Needed  Area of need?: Yes      Diabetes Self Support Plan         Assessment Summary and Plan    Based on today's diabetes care assessment, the following areas of need were identified:      Social 12/30/2022   Support No   Cognitive/Behavioral Health No   Culture/Hindu No   Health Literacy No        Clinical 12/30/2022   Medication Adherence Yes   Lab Compliance No   Nutritional Status Yes        YesProvided Diabetes management guide and provided instruction regarding SS and consume increased amount of carbohydrate foods and risk factors of diabetes.Instructed patient on what is Diabetes and the progression of uncontrolled diabetes. Discussed qualifying parameters of diabetes diagnosis. Reviewed/Instructed on disease  process. Discussed current treatment options, especially dietary and lifestyle changes as well as possible medication additions and/or changes. Patient verbalizes understanding of received information/education.    YesEmphasized importance of eliminating all SSB. Discussed SF drink options. Discussed carb vs non-carb foods and reviewed appropriate amounts of carbs to have at meals vs snacks. Recommended 30 - 45 gm carb at meals and 15 gm carb at snacks. Instructed on appropriate label reading and serving sizes of specific carb containing foods. Reviewed need to limit total/saturated fats. Discussed meal plans and snack ideas amenable to pt. Reviewed the plate method. Patient verbalizes understanding of received information/education.        YesDiscussed importance of adding physical activity in effort to help manage DM. Goal to increase physical activity to 5 times per week for 30 minutes. Provide different examples of exercise, including walking, jogging, cycling. Also provide pt with examples of chair exercise that can be done.     YesProvided Diabetes management guide and provided instruction regarding SS and consume increased amount of carbohydrate foods and risk factors of diabetes.Instructed patient on what is Diabetes and the progression of uncontrolled diabetes. Discussed qualifying parameters of diabetes diagnosis. Reviewed/Instructed on disease process. Discussed current treatment options, especially dietary and lifestyle changes as well as possible medication additions and/or changes. Patient verbalizes understanding of received information/education.    YesProvided patient with blood glucose logs, reviewed appropriate timing and frequency to SMBG, education on parameters on when to notify provider and advised patient to bring logs to all appts with PCP/Endocrinologist/Diabetes Care Specialist.Reviewed the importance of self-monitoring blood glucose and keeping logs.Instructed on how to self-monitor blood  glucose using a home glucometer, how to properly dispose of used strips and lancets after use, and how to appropriately store meter and supplies.    YesDiscussed hypoglycemia vs hyperglycemia symptoms and discussed appropriate treatments for each. Reviewed that pt is at high risk of hypo with current medication regimen. Discussed general vs severe hyperglycemia and risk of DKA.    YesProvided Diabetes management guide and provided instruction regarding the disease progression if diabetes is uncontrolled. Reviewed ways to decrease risk of chronic complications by healthy eating and having regular activity. Maintaining optimal blood glucose control. Following up with Diabetic team which includes PCP, KRISTI MD (if applicable), yearly eye exam, yearly foot exam and Diabetes care specialist .Patient verbalizes understanding of received information/education.     YesCovered the following coping strategies to help deal with chronic disease.   Provided current list of diabetes support group meeting locations and contact information, written online and community resources   Discussed role stress on blood sugar control and diabetes health   Discussed role sleep on health and diabetes control  Discussed feelings associated with chronic disease management  Consider talking with family or close friend  Consider talking with certified counselor, psychologist or psychiatrist  Recommended ways to reduce stress such as: reading a book or magazine, enjoy a cup of tea or coffee, take a walk, journal, draw or color, paint, play an instrument, singing, gardening, crafting, woodwork, prayer, meditation, yoga or rachel chi, develop a sleep schedule           Today's interventions were provided through individual discussion, instruction, and written materials were provided.      Patient verbalized understanding of instruction and written materials.  Pt was able to return back demonstration of instructions today. Patient understood key points,  needs reinforcement and further instruction.     Diabetes Self-Management Care Plan:    Today's Diabetes Self-Management Care Plan was developed with Henrietta's input. Henrietta has agreed to work toward the following goal(s) to improve his/her overall diabetes control.      Care Plan: Diabetes Management   Updates made since 11/30/2022 12:00 AM        Problem: Healthy Eating         Goal: Eat3 meals daily with 45-60g/3-4 servings of Carbohydrate per meal.    Start Date: 12/30/2022   Expected End Date: 1/31/2023   Priority: High   Barriers: Knowledge deficit   Note:    Instructed pt on the food groups, how to read labels and count carbs. Pt was given sample menus and  meal plans as examples. Discussed with pt the importance of eating 3 balanced and portioned meals per day. Discussed with pt how to put her meals together. Pt usually eats one meal per day and takes her mealtime insulin once a day. Explained to pt the importance of eating her 3 meals and taking her insulin 3 times a day to get her blood sugars in better control. Pt will work on this.       Task: Reviewed the sources and role of Carbohydrate, Protein, and Fat and how each nutrient impacts blood sugar. Completed 12/30/2022        Task: Provided visual examples using dry measuring cups, food models, and other familiar objects such as computer mouse, deck or cards, tennis ball etc. to help with visualization of portions. Completed 12/30/2022        Task: Explained how to count carbohydrates using the food label and the use of dry measuring cups for accurate carb counting. Completed 12/30/2022        Task: Discussed strategies for choosing healthier menu options when dining out. Completed 12/30/2022        Task: Recommended replacing beverages containing high sugar content with noncaloric/sugar free options and/or water.         Task: Review the importance of balancing carbohydrates with each meal using portion control techniques to count servings of  carbohydrate and label reading to identify serving size and amount of total carbs per serving. Completed 12/30/2022        Task: Provided Sample plate method and reviewed the use of the plate to estimate amounts of carbohydrate per meal. Completed 12/30/2022          Follow Up Plan     Follow up in about 1 month (around 1/31/2023).    Today's care plan and follow up schedule was discussed with patient.  Henrietta verbalized understanding of the care plan, goals, and agrees to follow up plan.        The patient was encouraged to communicate with his/her health care provider/physician and care team regarding his/her condition(s) and treatment.  I provided the patient with my contact information today and encouraged to contact me via phone or [facility] Patient Portal as needed.     Length of Visit   Total Time: 60 Minutes

## 2023-01-01 NOTE — PROGRESS NOTES
Subjective:      Patient ID: Henrietta Villasenor is a 82 y.o. female.    Chief Complaint: Diabetes Mellitus (Pcp - Jeannine Arriaga MD - 9/9/2022) and Nail Care      Henrietta is a 82 y.o. female who presents to the clinic for evaluation and treatment of high risk feet. Henrietta has a past medical history of Abnormality of lung (11/08/2011), Anxiety, Arthritis, CAD (coronary artery disease), Calculus of ureter (2/22/2011), CHF (congestive heart failure), Chronic back pain (7/29/2012), Colon polyp (9/2010; 11/2010), Colon polyps (7/29/2012), Coronary artery disease involving native coronary artery of native heart with angina pectoris, Depression, Diabetes mellitus, Diabetes mellitus type II, Diverticulitis large intestine (7/27/2015), Diverticulitis of large intestine without perforation or abscess without bleeding, Diverticulosis (09/25/2010; 11/02/2010; 11/08/2011; 7/29/2012), Duodenal disorder (08/25/2011), Duodenal ulcer, unspecified as acute or chronic, without hemorrhage, perforation, or obstruction (8/24/2011), E. 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), 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), Obesity, MARLON (obstructive sleep apnea), PAD (peripheral artery disease) (2/4/2021), Pain, Peripheral neuropathy, Pneumonia, Polyneuropathy, Postherpetic neuralgia, Recurrent nephrolithiasis, S/P knee replacement (10/2/2012), S/P TKR (total knee replacement) (12/26/2012),  Sensorineural hearing loss of both ears, Thyroid disease (11/08/2011), Trouble in sleeping, Type II or unspecified type diabetes mellitus with neurological manifestations, not stated as uncontrolled(250.60), Type II or unspecified type diabetes mellitus with peripheral circulatory disorders, not stated as uncontrolled(250.70), and Type II or unspecified type diabetes mellitus with renal manifestations, not stated as uncontrolled(250.40). The patient's chief complaint is long, thick toenails. This patient has documented high risk feet requiring routine maintenance secondary to diabetes mellitis and those secondary complications of diabetes, as mentioned..       PCP: Jeannine Arriaga MD    Date Last Seen by PCP:   Chief Complaint   Patient presents with    Diabetes Mellitus     Pcp - Jeannine Arriaga MD - 9/9/2022    Nail Care         Current shoe gear: casual shoes    Hemoglobin A1C   Date Value Ref Range Status   09/09/2022 8.6 (H) 4.0 - 5.6 % Final     Comment:     ADA Screening Guidelines:  5.7-6.4%  Consistent with prediabetes  >or=6.5%  Consistent with diabetes    High levels of fetal hemoglobin interfere with the HbA1C  assay. Heterozygous hemoglobin variants (HbS, HgC, etc)do  not significantly interfere with this assay.   However, presence of multiple variants may affect accuracy.     03/17/2022 9.3 (H) 4.0 - 5.6 % Final     Comment:     ADA Screening Guidelines:  5.7-6.4%  Consistent with prediabetes  >or=6.5%  Consistent with diabetes    High levels of fetal hemoglobin interfere with the HbA1C  assay. Heterozygous hemoglobin variants (HbS, HgC, etc)do  not significantly interfere with this assay.   However, presence of multiple variants may affect accuracy.     11/16/2021 9.7 (H) 4.0 - 5.6 % Final     Comment:     ADA Screening Guidelines:  5.7-6.4%  Consistent with prediabetes  >or=6.5%  Consistent with diabetes    High levels of fetal hemoglobin interfere with the HbA1C  assay. Heterozygous  "hemoglobin variants (HbS, HgC, etc)do  not significantly interfere with this assay.   However, presence of multiple variants may affect accuracy.         Review of Systems   Constitutional: Negative for chills, decreased appetite and fever.   Cardiovascular:  Negative for leg swelling.   Skin:  Positive for color change, dry skin and nail changes. Negative for flushing and itching.   Musculoskeletal:  Positive for arthritis and stiffness. Negative for back pain, falls, joint pain, joint swelling, muscle cramps and myalgias.   Gastrointestinal:  Negative for nausea and vomiting.   Neurological:  Positive for numbness and paresthesias. Negative for loss of balance.         Objective:       Vitals:    10/19/22 1250   BP: (!) 147/79   Pulse: 78   Weight: 107 kg (235 lb 14.3 oz)   Height: 5' 8" (1.727 m)   PainSc: 0-No pain          Physical Exam  Vitals and nursing note reviewed.   Constitutional:       Appearance: She is well-developed.   Cardiovascular:      Comments: Dorsalis pedis and posterior tibial pulses are diminished Bilaterally. Toes are cool to touch. Feet are warm proximally.There is decreased digital hair. Skin is atrophic, slightly hyperpigmented, and mildly edematous      Musculoskeletal:         General: No tenderness. Normal range of motion.      Comments: Adequate joint range of motion without pain, limitation, nor crepitation Bilateral feet and ankle joints. Muscle strength is 5/5 in all groups bilaterally.      semi reducible IPJ digital contracture 2nd toe b/l    Feet:      Right foot:      Skin integrity: No ulcer.      Left foot:      Skin integrity: No ulcer.   Skin:     General: Skin is warm and dry.      Findings: No abrasion, bruising, burn, ecchymosis, erythema or lesion.      Comments: Nails x 10  are elongated by  4-6mm's, thickened by 2-3 mm's, dystrophic, and are darkened in  coloration . Xerosis Bilaterally. No open lesions noted.    Hyperkeratotic tissue noted to distal toes 2and 4 b/l "        Neurological:      Mental Status: She is alert and oriented to person, place, and time.      Comments: [place] 5.07 monofilamant testing is diminished Mynor feet. Sharp/dull sensation diminished Bilaterally. Light touch absent Bilaterally.       Psychiatric:         Behavior: Behavior normal.             Assessment:       Encounter Diagnoses   Name Primary?    Diabetic polyneuropathy associated with type 2 diabetes mellitus Yes    Corn or callus     Onychomycosis due to dermatophyte          Plan:       Henrietta was seen today for diabetes mellitus and nail care.    Diagnoses and all orders for this visit:    Diabetic polyneuropathy associated with type 2 diabetes mellitus    Corn or callus    Onychomycosis due to dermatophyte    I counseled the patient on her conditions, their implications and medical management.    Shoe inspection. Diabetic Foot Education. Patient reminded of the importance of good nutrition and blood sugar control to help prevent podiatric complications of diabetes. Patient instructed on proper foot hygeine. We discussed wearing proper shoe gear, daily foot inspections, never walking without protective shoe gear, never putting sharp instruments to feet    - With patient's permission, nails were aggressively reduced and debrided x 10 to their soft tissue attachment mechanically and with electric , removing all offending nail and debris. Patient relates relief following the procedure. She will continue to monitor the areas daily, inspect her feet, wear protective shoe gear when ambulatory, moisturizer to maintain skin integrity and follow in this office in approximately 2-3 months, sooner p.r.n.    - After cleansing the  area w/ alcohol prep pad the above mentioned hyperkeratosis was trimmed utilizing No 15 scapel, to a smooth base with out incident. Patient tolerated this  well and reported comfort to the area of distal toes 2 and 4 b/l   .

## 2023-01-06 ENCOUNTER — OUTPATIENT CASE MANAGEMENT (OUTPATIENT)
Dept: ADMINISTRATIVE | Facility: OTHER | Age: 83
End: 2023-01-06
Payer: MEDICARE

## 2023-01-06 RX ORDER — OXYCODONE AND ACETAMINOPHEN 10; 325 MG/1; MG/1
1 TABLET ORAL
Qty: 150 TABLET | Refills: 0 | Status: SHIPPED | OUTPATIENT
Start: 2023-02-08 | End: 2023-03-08 | Stop reason: SDUPTHER

## 2023-01-06 NOTE — PROGRESS NOTES
Outpatient Care Management   - High Risk Patient Assessment    Patient: Henrietta Villasenor  MRN:  [id number]  Date of Service:  1/6/2023  Completed by:  Dionne Azul LCSW  Referral Date: 12/27/2022    Reason for Visit   Patient presents with    OPCM Chart Review    Social Work Assessment - High Risk    Plan Of Care       Brief Summary:  received a referral from OPCM RN Jamee Toth for the following patient identified psycho-social needs: homemaker services, senior center, utility bill.  No urgent need for assistance with utility bill.  Pt applies for assistance when needed. Care plan was created in collaboration with patient/caregiver input.

## 2023-01-06 NOTE — TELEPHONE ENCOUNTER
Care Due:                  Date            Visit Type   Department     Provider  --------------------------------------------------------------------------------                                EP -                              PRIMARY      [facility] INTERNAL  Last Visit: 12-      CARE ([facility])   MEDICINE       FEDERICO MARCIAL                               -                              PRIMARY      [facility] INTERNAL  Next Visit: 03-      CARE ([facility])   MEDICINE       FEDERICO MARCIAL                                                            Last  Test          Frequency    Reason                     Performed    Due Date  --------------------------------------------------------------------------------    HBA1C.......  6 months...  insulin..................  09-   03-    Uric Acid...  12 months..  allopurinoL..............  07- 07-    [employer] Embedded Care Gaps. Reference number: [id number]. 1/06/2023   12:32:27 PM CST

## 2023-01-06 NOTE — TELEPHONE ENCOUNTER
----- Message from Clover Shrestha sent at 1/6/2023 12:14 PM CST -----  Contact: [phone] Patient  Requesting an RX refill or new RX.  Is this a refill or new RX: new  RX name and strength (copy/paste from chart):  oxyCODONE-acetaminophen (PERCOCET)  mg per tablet  Is this a 30 day or 90 day RX:   Pharmacy name and phone # (copy/paste from chart):    [facility]  [address] [place] [id number]  Phone: [phone] Fax: [phone]

## 2023-01-09 ENCOUNTER — OUTPATIENT CASE MANAGEMENT (OUTPATIENT)
Dept: ADMINISTRATIVE | Facility: OTHER | Age: 83
End: 2023-01-09
Payer: MEDICARE

## 2023-01-09 NOTE — PROGRESS NOTES
Outpatient Care Management  Plan of Care Follow Up Visit    Patient: Henrietta Villasenor  MRN: [id number]  Date of Service: 01/09/2023  Completed by: Jamee Toth RN  Referral Date: 12/27/2022    Reason for Visit   Patient presents with    OPCM Chart Review     1/9/23    Update Plan Of Care     1/9/23       Brief Summary:     OPCM follow up complete. Continued education on CHF. Patient is in agreement with follow up call on or around 1/17/23.

## 2023-01-17 ENCOUNTER — OFFICE VISIT (OUTPATIENT)
Dept: INTERNAL MEDICINE | Facility: CLINIC | Age: 83
End: 2023-01-17
Payer: MEDICARE

## 2023-01-17 VITALS
HEART RATE: 83 BPM | SYSTOLIC BLOOD PRESSURE: 152 MMHG | OXYGEN SATURATION: 96 % | HEIGHT: 68 IN | BODY MASS INDEX: 33.25 KG/M2 | WEIGHT: 219.38 LBS | DIASTOLIC BLOOD PRESSURE: 70 MMHG

## 2023-01-17 DIAGNOSIS — J30.9 ALLERGIC RHINITIS, UNSPECIFIED SEASONALITY, UNSPECIFIED TRIGGER: ICD-10-CM

## 2023-01-17 DIAGNOSIS — R05.3 CHRONIC COUGH: Primary | ICD-10-CM

## 2023-01-17 LAB
CTP QC/QA: YES
CTP QC/QA: YES
FLUAV AG NPH QL: NEGATIVE
FLUBV AG NPH QL: NEGATIVE
SARS-COV-2 RDRP RESP QL NAA+PROBE: NEGATIVE

## 2023-01-17 PROCEDURE — 87804 INFLUENZA ASSAY W/OPTIC: CPT | Mod: QW,HCNC,S$GLB, | Performed by: INTERNAL MEDICINE

## 2023-01-17 PROCEDURE — 87635: ICD-10-PCS | Mod: QW,HCNC,S$GLB, | Performed by: INTERNAL MEDICINE

## 2023-01-17 PROCEDURE — 3078F PR MOST RECENT DIASTOLIC BLOOD PRESSURE < 80 MM HG: ICD-10-PCS | Mod: HCNC,CPTII,S$GLB, | Performed by: INTERNAL MEDICINE

## 2023-01-17 PROCEDURE — 1101F PR PT FALLS ASSESS DOC 0-1 FALLS W/OUT INJ PAST YR: ICD-10-PCS | Mod: HCNC,CPTII,S$GLB, | Performed by: INTERNAL MEDICINE

## 2023-01-17 PROCEDURE — 1101F PT FALLS ASSESS-DOCD LE1/YR: CPT | Mod: HCNC,CPTII,S$GLB, | Performed by: INTERNAL MEDICINE

## 2023-01-17 PROCEDURE — 1160F PR REVIEW ALL MEDS BY PRESCRIBER/CLIN PHARMACIST DOCUMENTED: ICD-10-PCS | Mod: HCNC,CPTII,S$GLB, | Performed by: INTERNAL MEDICINE

## 2023-01-17 PROCEDURE — 99213 PR OFFICE/OUTPT VISIT, EST, LEVL III, 20-29 MIN: ICD-10-PCS | Mod: HCNC,S$GLB,, | Performed by: INTERNAL MEDICINE

## 2023-01-17 PROCEDURE — 99999 PR PBB SHADOW E&M-EST. PATIENT-LVL V: ICD-10-PCS | Mod: PBBFAC,HCNC,, | Performed by: INTERNAL MEDICINE

## 2023-01-17 PROCEDURE — 99213 OFFICE O/P EST LOW 20 MIN: CPT | Mod: HCNC,S$GLB,, | Performed by: INTERNAL MEDICINE

## 2023-01-17 PROCEDURE — 1159F MED LIST DOCD IN RCRD: CPT | Mod: HCNC,CPTII,S$GLB, | Performed by: INTERNAL MEDICINE

## 2023-01-17 PROCEDURE — 1159F PR MEDICATION LIST DOCUMENTED IN MEDICAL RECORD: ICD-10-PCS | Mod: HCNC,CPTII,S$GLB, | Performed by: INTERNAL MEDICINE

## 2023-01-17 PROCEDURE — 1160F RVW MEDS BY RX/DR IN RCRD: CPT | Mod: HCNC,CPTII,S$GLB, | Performed by: INTERNAL MEDICINE

## 2023-01-17 PROCEDURE — 1126F PR PAIN SEVERITY QUANTIFIED, NO PAIN PRESENT: ICD-10-PCS | Mod: HCNC,CPTII,S$GLB, | Performed by: INTERNAL MEDICINE

## 2023-01-17 PROCEDURE — 99999 PR PBB SHADOW E&M-EST. PATIENT-LVL V: CPT | Mod: PBBFAC,HCNC,, | Performed by: INTERNAL MEDICINE

## 2023-01-17 PROCEDURE — 87804 POCT INFLUENZA A/B: ICD-10-PCS | Mod: QW,HCNC,S$GLB, | Performed by: INTERNAL MEDICINE

## 2023-01-17 PROCEDURE — 1126F AMNT PAIN NOTED NONE PRSNT: CPT | Mod: HCNC,CPTII,S$GLB, | Performed by: INTERNAL MEDICINE

## 2023-01-17 PROCEDURE — 3288F PR FALLS RISK ASSESSMENT DOCUMENTED: ICD-10-PCS | Mod: HCNC,CPTII,S$GLB, | Performed by: INTERNAL MEDICINE

## 2023-01-17 PROCEDURE — 3288F FALL RISK ASSESSMENT DOCD: CPT | Mod: HCNC,CPTII,S$GLB, | Performed by: INTERNAL MEDICINE

## 2023-01-17 PROCEDURE — 3078F DIAST BP <80 MM HG: CPT | Mod: HCNC,CPTII,S$GLB, | Performed by: INTERNAL MEDICINE

## 2023-01-17 PROCEDURE — 3077F PR MOST RECENT SYSTOLIC BLOOD PRESSURE >= 140 MM HG: ICD-10-PCS | Mod: HCNC,CPTII,S$GLB, | Performed by: INTERNAL MEDICINE

## 2023-01-17 PROCEDURE — 3077F SYST BP >= 140 MM HG: CPT | Mod: HCNC,CPTII,S$GLB, | Performed by: INTERNAL MEDICINE

## 2023-01-17 PROCEDURE — 87635 SARS-COV-2 COVID-19 AMP PRB: CPT | Mod: QW,HCNC,S$GLB, | Performed by: INTERNAL MEDICINE

## 2023-01-17 RX ORDER — PROMETHAZINE HYDROCHLORIDE AND DEXTROMETHORPHAN HYDROBROMIDE 6.25; 15 MG/5ML; MG/5ML
5 SYRUP ORAL NIGHTLY PRN
Qty: 120 ML | Refills: 0 | Status: SHIPPED | OUTPATIENT
Start: 2023-01-17 | End: 2023-01-27

## 2023-01-17 RX ORDER — LORATADINE 10 MG/1
10 TABLET ORAL DAILY
Qty: 30 TABLET | Refills: 4 | Status: SHIPPED | OUTPATIENT
Start: 2023-01-17 | End: 2023-05-24

## 2023-01-17 RX ORDER — BENZONATATE 200 MG/1
200 CAPSULE ORAL 3 TIMES DAILY PRN
Qty: 30 CAPSULE | Refills: 0 | Status: SHIPPED | OUTPATIENT
Start: 2023-01-17 | End: 2023-01-18

## 2023-01-18 ENCOUNTER — IMMUNIZATION (OUTPATIENT)
Dept: INTERNAL MEDICINE | Facility: CLINIC | Age: 83
End: 2023-01-18
Payer: MEDICARE

## 2023-01-18 ENCOUNTER — TELEPHONE (OUTPATIENT)
Dept: INTERNAL MEDICINE | Facility: CLINIC | Age: 83
End: 2023-01-18

## 2023-01-18 ENCOUNTER — HOSPITAL ENCOUNTER (OUTPATIENT)
Dept: RADIOLOGY | Facility: HOSPITAL | Age: 83
Discharge: HOME | End: 2023-01-18
Attending: INTERNAL MEDICINE
Payer: MEDICARE

## 2023-01-18 ENCOUNTER — TELEPHONE (OUTPATIENT)
Dept: PODIATRY | Facility: CLINIC | Age: 83
End: 2023-01-18
Payer: MEDICARE

## 2023-01-18 ENCOUNTER — OUTPATIENT CASE MANAGEMENT (OUTPATIENT)
Dept: ADMINISTRATIVE | Facility: OTHER | Age: 83
End: 2023-01-18
Payer: MEDICARE

## 2023-01-18 DIAGNOSIS — Z23 NEED FOR VACCINATION: Primary | ICD-10-CM

## 2023-01-18 DIAGNOSIS — R05.3 CHRONIC COUGH: ICD-10-CM

## 2023-01-18 PROCEDURE — 91312 COVID-19, MRNA, LNP-S, BIVALENT BOOSTER, PF, 30 MCG/0.3 ML DOSE: ICD-10-PCS | Mod: HCNC,S$GLB,, | Performed by: INTERNAL MEDICINE

## 2023-01-18 PROCEDURE — 71046 XR CHEST PA AND LATERAL: ICD-10-PCS | Mod: 26,HCNC,, | Performed by: RADIOLOGY

## 2023-01-18 PROCEDURE — 71046 X-RAY EXAM CHEST 2 VIEWS: CPT | Mod: 26,HCNC,, | Performed by: RADIOLOGY

## 2023-01-18 PROCEDURE — 0124A COVID-19, MRNA, LNP-S, BIVALENT BOOSTER, PF, 30 MCG/0.3 ML DOSE: CPT | Mod: HCNC,CV19,PBBFAC | Performed by: INTERNAL MEDICINE

## 2023-01-18 PROCEDURE — 71046 X-RAY EXAM CHEST 2 VIEWS: CPT | Mod: TC,HCNC

## 2023-01-18 PROCEDURE — 91312 COVID-19, MRNA, LNP-S, BIVALENT BOOSTER, PF, 30 MCG/0.3 ML DOSE: CPT | Mod: HCNC,S$GLB,, | Performed by: INTERNAL MEDICINE

## 2023-01-18 RX ORDER — BENZONATATE 100 MG/1
100 CAPSULE ORAL 3 TIMES DAILY PRN
Qty: 30 CAPSULE | Refills: 0 | Status: SHIPPED | OUTPATIENT
Start: 2023-01-18 | End: 2023-01-29

## 2023-01-18 NOTE — TELEPHONE ENCOUNTER
Pharmacy staff explained they don't have the benzonatate (TESSALON) 200 MG capsule in stock they have 100mg.

## 2023-01-18 NOTE — TELEPHONE ENCOUNTER
Patient has been called regarding the need to reschedule their today's appointment with Dr. Ho.    Patient didn't answer. Detailed message was left.

## 2023-01-18 NOTE — TELEPHONE ENCOUNTER
Spoke to  and she changed the Tessalon Rx to 100 mg po-- called for the pt on mobile and home phone but she was not available. Lm on the home phone.

## 2023-01-18 NOTE — PATIENT INSTRUCTIONS
Tessalon pearls by mouth 3x/day for cough.  Phenergan DM 5 mL by mouth at bedtime for cough.  Start claritin 10 mg by mouth daily.

## 2023-01-18 NOTE — PROGRESS NOTES
Subjective:       Patient ID: Henrietta Villasenor is a 82 y.o. female.    Chief Complaint: Cough      Henrietta Villasenor is 82 y.o. female who presents for dry cough X 2 months.  Pt was diagnosed with bronchitis in Dec 2022 and tx with course of doxycycline.  Pt has been taking cough drops without change in symptoms.  Pt taking her flonase 1 spray per nostril daily but ran out of her claritin.        Review of Systems   Constitutional:  Positive for fever (subjective). Negative for chills.   HENT:  Positive for sinus pressure/congestion and sore throat. Negative for rhinorrhea.    Respiratory:  Positive for cough and shortness of breath.    Gastrointestinal:  Positive for diarrhea. Negative for abdominal pain, constipation, nausea and vomiting.   Psychiatric/Behavioral:  Self-injury: yesterday.        Objective:      Physical Exam  Vitals reviewed.   Constitutional:       General: She is awake.      Appearance: Normal appearance.   HENT:      Head: Normocephalic and atraumatic.      Nose:      Right Turbinates: Swollen.      Left Turbinates: Swollen.      Right Sinus: No maxillary sinus tenderness or frontal sinus tenderness.      Left Sinus: No maxillary sinus tenderness or frontal sinus tenderness.      Mouth/Throat:      Mouth: Mucous membranes are moist.      Pharynx: Oropharynx is clear.   Eyes:      Extraocular Movements: Extraocular movements intact.      Conjunctiva/sclera: Conjunctivae normal.      Pupils: Pupils are equal, round, and reactive to light.   Cardiovascular:      Rate and Rhythm: Normal rate and regular rhythm.      Heart sounds: No murmur heard.    No friction rub. No gallop.   Pulmonary:      Effort: Pulmonary effort is normal.      Breath sounds: Normal breath sounds.   Abdominal:      General: Bowel sounds are normal. There is no distension.      Tenderness: There is no abdominal tenderness. There is no guarding or rebound.   Musculoskeletal:      Right lower leg: No edema.      Left lower leg: No  edema.   Lymphadenopathy:      Cervical: No cervical adenopathy.   Neurological:      Mental Status: She is alert and oriented to person, place, and time.      Cranial Nerves: Facial asymmetry (right facial droop (chronic and stable per pt)) present.   Psychiatric:         Mood and Affect: Mood normal.         Behavior: Behavior is cooperative.       Assessment:       1. Chronic cough    2. Allergic rhinitis, unspecified seasonality, unspecified trigger        Plan:     Pt with cough X 2 month unclear etiology.  COVID 19 and flu testing negative.  Will get CXR to evaluate for occult pneumonia although given pt was recently tx with course of doxycyline low clinical suspicion for pneumonia.  Possibly secondary to allergic rhinitis so recommend pt take her Claritin 10 mg po daily and Flonase 1 spray per nostril daily.  Will tx cough with tessalon 200 mg po TID and phenergan DM 5mL po QHS.  Patient was advised to follow up if symptom are not improving or worsened.    Henrietta was seen today for cough.    Diagnoses and all orders for this visit:    Chronic cough  -     POCT Influenza A/B  -     POCT COVID-19 Rapid Screening  -     X-Ray Chest PA And Lateral; Future  -     benzonatate (TESSALON) 200 MG capsule; Take 1 capsule (200 mg total) by mouth 3 (three) times daily as needed for Cough.  -     promethazine-dextromethorphan (PROMETHAZINE-DM) 6.25-15 mg/5 mL Syrp; Take 5 mLs by mouth nightly as needed (cough).    Allergic rhinitis, unspecified seasonality, unspecified trigger  -     loratadine (CLARITIN) 10 mg tablet; Take 1 tablet (10 mg total) by mouth once daily.

## 2023-01-18 NOTE — PROGRESS NOTES
Outpatient Care Management  Plan of Care Follow Up Visit    Patient: Henrietta Villasenor  MRN: [id number]  Date of Service: 01/18/2023  Completed by: Jamee Toth RN  Referral Date: 12/27/2022    Reason for Visit   Patient presents with    OPCM Chart Review     1/18/23    Update Plan Of Care     1/20/23       Brief Summary:     OPCM follow up complete. Continued education on CHF. Patient is in agreement with follow up call on or around 1/27/23.    1/18/23 Patient has multiple appointments today. Will attempt to contact at a later date.

## 2023-01-19 ENCOUNTER — TELEPHONE (OUTPATIENT)
Dept: INTERNAL MEDICINE | Facility: CLINIC | Age: 83
End: 2023-01-19
Payer: MEDICARE

## 2023-01-19 ENCOUNTER — OUTPATIENT CASE MANAGEMENT (OUTPATIENT)
Dept: ADMINISTRATIVE | Facility: OTHER | Age: 83
End: 2023-01-19
Payer: MEDICARE

## 2023-01-19 NOTE — TELEPHONE ENCOUNTER
----- Message from Ame Velázquez sent at 1/19/2023  3:16 PM CST -----  Good afternoon,    Patient stated that she's constantly coughing and medication is not working. Please call the patient to discuss more.  Thanks,

## 2023-01-19 NOTE — TELEPHONE ENCOUNTER
----- Message from Mosies Velázquez sent at 1/19/2023 11:09 AM CST -----  Contact: Pt [phone]  [email or web address]     Patient is calling for Medical Advice regarding: Severe cough not sleeping     How long has patient had these symptoms: over a week     Pharmacy name and phone#:[facility]   Phone:  [phone]  Fax:  [phone]      Would like response via [employer]:  call    Comments:

## 2023-01-20 DIAGNOSIS — N18.30 TYPE 2 DIABETES MELLITUS WITH STAGE 3 CHRONIC KIDNEY DISEASE, WITH LONG-TERM CURRENT USE OF INSULIN: ICD-10-CM

## 2023-01-20 DIAGNOSIS — E11.22 TYPE 2 DIABETES MELLITUS WITH STAGE 3 CHRONIC KIDNEY DISEASE, WITH LONG-TERM CURRENT USE OF INSULIN: ICD-10-CM

## 2023-01-20 DIAGNOSIS — Z79.4 TYPE 2 DIABETES MELLITUS WITH STAGE 3 CHRONIC KIDNEY DISEASE, WITH LONG-TERM CURRENT USE OF INSULIN: ICD-10-CM

## 2023-01-20 RX ORDER — PREDNISONE 10 MG/1
TABLET ORAL
Qty: 10 TABLET | Refills: 0 | Status: SHIPPED | OUTPATIENT
Start: 2023-01-20 | End: 2023-01-20

## 2023-01-20 RX ORDER — ISOPROPYL ALCOHOL 70 ML/100ML
SWAB TOPICAL
Qty: 400 EACH | Refills: 3 | Status: SHIPPED | OUTPATIENT
Start: 2023-01-20

## 2023-01-20 RX ORDER — LEVOFLOXACIN 500 MG/1
500 TABLET, FILM COATED ORAL DAILY
Qty: 10 TABLET | Refills: 0 | Status: SHIPPED | OUTPATIENT
Start: 2023-01-20 | End: 2023-01-30

## 2023-01-20 RX ORDER — PREDNISONE 10 MG/1
TABLET ORAL
Qty: 10 TABLET | Refills: 0 | Status: SHIPPED | OUTPATIENT
Start: 2023-01-20 | End: 2023-03-08

## 2023-01-20 RX ORDER — LEVOFLOXACIN 500 MG/1
500 TABLET, FILM COATED ORAL DAILY
Qty: 10 TABLET | Refills: 0 | Status: SHIPPED | OUTPATIENT
Start: 2023-01-20 | End: 2023-01-20

## 2023-01-20 NOTE — TELEPHONE ENCOUNTER
Refill Decision Note   Henrietta Hamilton  is requesting a refill authorization.  Brief Assessment and Rationale for Refill:  Approve     Medication Therapy Plan:  LOV 12/8/22    Medication Reconciliation Completed: No   Comments:     No Care Gaps recommended.     Note composed:1:17 PM 01/20/2023

## 2023-01-20 NOTE — TELEPHONE ENCOUNTER
"Called and spoke to pt. Pt states she has been suffering with a "cold" onset 2 months ago. Pt states her sx have become worse over the last 2 weeks. Pt states she was seen by Dr Coker on 1/17 and had a chest Xray on 1/18. Pt states she was given cough medication and she is getting no relief. Pt states she feels worse today, pt states she is weak and has had decreased appetite. Pt denies fever and dyspnea. Explained to pt that considering she is feeling worse, I would recommend that she be re-evaluated in UC/ED. Pt verbalized understanding. No available clinic appt today.  "

## 2023-01-20 NOTE — TELEPHONE ENCOUNTER
Please notify pt  Dr YANEZ sent in a prescription of levafoxacin 500 mg daily for 10 days and a prednisone taper to [facility]

## 2023-01-20 NOTE — TELEPHONE ENCOUNTER
No new care gaps identified.  [facility] Embedded Care Gaps. Reference number: [id number]. 1/20/2023   10:43:56 AM CST

## 2023-01-20 NOTE — TELEPHONE ENCOUNTER
Called and  relayed message from PCP:  Dr YANEZ sent in a prescription of levafoxacin 500 mg daily for 10 days and a prednisone taper to [facility] [address]    Pt verbalized understanding. Explained should pt become SOB, she should report to ED. Pt verbalized understanding.

## 2023-01-25 ENCOUNTER — TELEPHONE (OUTPATIENT)
Dept: INTERNAL MEDICINE | Facility: CLINIC | Age: 83
End: 2023-01-25
Payer: MEDICARE

## 2023-01-25 NOTE — TELEPHONE ENCOUNTER
----- Message from Rossana Walsh sent at 1/25/2023 12:36 PM CST -----  Contact: BRITTANY SWEET [[id number]]@ [phone]  Patient did not have transportation today. Please reschedule her blood pressure nurse appointment to tomorrow deion time after 11;00 am

## 2023-01-25 NOTE — TELEPHONE ENCOUNTER
----- Message from Evangelina Baires sent at 1/25/2023  8:38 AM CST -----  Contact: Self/[phone]  Pt said that she is calling in regards to needing to know if she can get the nurse visit she has scheduled for today at 10:30 at a later time. Please advise

## 2023-01-26 ENCOUNTER — TELEPHONE (OUTPATIENT)
Dept: INTERNAL MEDICINE | Facility: CLINIC | Age: 83
End: 2023-01-26

## 2023-01-26 ENCOUNTER — CLINICAL SUPPORT (OUTPATIENT)
Dept: INTERNAL MEDICINE | Facility: CLINIC | Age: 83
End: 2023-01-26
Payer: MEDICARE

## 2023-01-26 VITALS — SYSTOLIC BLOOD PRESSURE: 150 MMHG | DIASTOLIC BLOOD PRESSURE: 90 MMHG

## 2023-01-26 DIAGNOSIS — R55 SYNCOPE AND COLLAPSE: ICD-10-CM

## 2023-01-26 DIAGNOSIS — I10 ESSENTIAL HYPERTENSION: Primary | ICD-10-CM

## 2023-01-26 DIAGNOSIS — R07.9 CHEST PAIN, UNSPECIFIED TYPE: ICD-10-CM

## 2023-01-26 DIAGNOSIS — I25.119 CORONARY ARTERY DISEASE INVOLVING NATIVE CORONARY ARTERY OF NATIVE HEART WITH ANGINA PECTORIS: ICD-10-CM

## 2023-01-26 DIAGNOSIS — E11.42 TYPE 2 DIABETES MELLITUS WITH DIABETIC POLYNEUROPATHY, WITH LONG-TERM CURRENT USE OF INSULIN: ICD-10-CM

## 2023-01-26 DIAGNOSIS — E78.00 HYPERCHOLESTEROLEMIA: ICD-10-CM

## 2023-01-26 DIAGNOSIS — I10 ESSENTIAL HYPERTENSION: Chronic | ICD-10-CM

## 2023-01-26 DIAGNOSIS — G47.33 OSA ON CPAP: Chronic | ICD-10-CM

## 2023-01-26 DIAGNOSIS — I50.32 CHRONIC DIASTOLIC CONGESTIVE HEART FAILURE: ICD-10-CM

## 2023-01-26 DIAGNOSIS — Z79.4 TYPE 2 DIABETES MELLITUS WITH DIABETIC POLYNEUROPATHY, WITH LONG-TERM CURRENT USE OF INSULIN: ICD-10-CM

## 2023-01-26 PROCEDURE — 99999 PR PBB SHADOW E&M-EST. PATIENT-LVL I: CPT | Mod: PBBFAC,HCNC,,

## 2023-01-26 PROCEDURE — 99999 PR PBB SHADOW E&M-EST. PATIENT-LVL I: ICD-10-PCS | Mod: PBBFAC,HCNC,,

## 2023-01-26 RX ORDER — CARVEDILOL 25 MG/1
25 TABLET ORAL 2 TIMES DAILY
Qty: 60 TABLET | Refills: 0 | Status: SHIPPED | OUTPATIENT
Start: 2023-01-26 | End: 2023-03-08 | Stop reason: SDUPTHER

## 2023-01-26 RX ORDER — AMLODIPINE BESYLATE 10 MG/1
10 TABLET ORAL DAILY
Qty: 90 TABLET | Refills: 0 | Status: SHIPPED | OUTPATIENT
Start: 2023-01-26 | End: 2023-03-08 | Stop reason: SDUPTHER

## 2023-01-26 NOTE — PROGRESS NOTES
Patient seen in clinic for blood pressure check. Blood pressure readings 160/90 repeat 150/90. Covering PCP notified. Patient requested refills on her blood pressure medication. Covering provider sent prescriptions to pharmacy.

## 2023-01-27 ENCOUNTER — OUTPATIENT CASE MANAGEMENT (OUTPATIENT)
Dept: ADMINISTRATIVE | Facility: OTHER | Age: 83
End: 2023-01-27
Payer: MEDICARE

## 2023-01-27 NOTE — PROGRESS NOTES
Outpatient Care Management  Plan of Care Follow Up Visit    Patient: Henrietta Villasenor  MRN: [id number]  Date of Service: 01/27/2023  Completed by: Jamee Toth RN  Referral Date: 12/27/2022    Reason for Visit   Patient presents with    OPCM Chart Review     1/27/23    Update Plan Of Care     1/27/23       Brief Summary:     OPCM follow up complete. Continued education on CHF. Patient is in agreement with follow up call on or around 2/3/23.

## 2023-02-02 ENCOUNTER — OUTPATIENT CASE MANAGEMENT (OUTPATIENT)
Dept: ADMINISTRATIVE | Facility: OTHER | Age: 83
End: 2023-02-02
Payer: MEDICARE

## 2023-02-03 ENCOUNTER — TELEPHONE (OUTPATIENT)
Dept: DIABETES | Facility: CLINIC | Age: 83
End: 2023-02-03
Payer: MEDICARE

## 2023-02-07 ENCOUNTER — OUTPATIENT CASE MANAGEMENT (OUTPATIENT)
Dept: ADMINISTRATIVE | Facility: OTHER | Age: 83
End: 2023-02-07
Payer: MEDICARE

## 2023-02-07 NOTE — PROGRESS NOTES
Outpatient Care Management  Patient Not Qualified    Patient: Henrietta Villasenor  MRN:  [id number]  Date of Service:  2/24/2023  Completed by:  Jamee Toth RN    Chief Complaint   Patient presents with    OPCM Chart Review     2/7/23    OPCM RN First Follow-Up Attempt     2/7/23    OPCM RN Second Follow-Up Attempt     2/10/23    OPCM RN Third Follow-Up Attempt     2/15/23    Case Closure     2/24/23       Patient Summary                  2/24/23 No response from patient. Closing case.     2/15/23 3rd attempt to complete follow-up for Outpatient Care Management: Left message for patient requesting a return call. Will close case if patient does not respond.     2/10/23 2nd attempt to complete follow-up for Outpatient Care Management: Voicemail not available. Will attempt to contact patient again at a later date.     2/7/23 1st attempt to complete follow-up for Outpatient Care Management: Voicemail not available. OPCM letter has been sent. Will attempt to contact patient again at a later date.

## 2023-02-07 NOTE — LETTER
February 7, 2023    Henrietta Villasenor  [address] [address]             [facility] [address] [place] [address] Dear Ms. Villasenor,    I work with [facility] Outpatient Case Management Department. I have been unsuccessful at reaching you to follow up to see how you have been doing. Please call me back at your earliest convenience to discuss your health care needs.    I can be reached at [phone] from 8:00 AM to 4:30 PM on Monday through Friday. [facility] is a program offered through [facility] where a nurse is available 24/7 to answer questions or provide medical advice. Their number is [phone].      Kind Regards,    Jamee Toth RN  Outpatient Case Management

## 2023-03-08 ENCOUNTER — LAB VISIT (OUTPATIENT)
Dept: LAB | Facility: HOSPITAL | Age: 83
End: 2023-03-08
Attending: INTERNAL MEDICINE
Payer: MEDICARE

## 2023-03-08 ENCOUNTER — OFFICE VISIT (OUTPATIENT)
Dept: INTERNAL MEDICINE | Facility: CLINIC | Age: 83
End: 2023-03-08
Payer: MEDICARE

## 2023-03-08 VITALS
SYSTOLIC BLOOD PRESSURE: 138 MMHG | WEIGHT: 216.19 LBS | HEART RATE: 83 BPM | HEIGHT: 68 IN | OXYGEN SATURATION: 96 % | BODY MASS INDEX: 32.76 KG/M2 | DIASTOLIC BLOOD PRESSURE: 66 MMHG

## 2023-03-08 DIAGNOSIS — Z87.898 HISTORY OF WHEEZING: ICD-10-CM

## 2023-03-08 DIAGNOSIS — D64.9 ANEMIA, UNSPECIFIED TYPE: ICD-10-CM

## 2023-03-08 DIAGNOSIS — F39 MOOD DISORDER: ICD-10-CM

## 2023-03-08 DIAGNOSIS — E21.3 HYPERPARATHYROIDISM, UNSPECIFIED: ICD-10-CM

## 2023-03-08 DIAGNOSIS — M46.06 SPINAL ENTHESOPATHY OF LUMBAR REGION: ICD-10-CM

## 2023-03-08 DIAGNOSIS — N18.31 TYPE 2 DIABETES MELLITUS WITH STAGE 3A CHRONIC KIDNEY DISEASE, WITH LONG-TERM CURRENT USE OF INSULIN: ICD-10-CM

## 2023-03-08 DIAGNOSIS — G47.33 OSA ON CPAP: Chronic | ICD-10-CM

## 2023-03-08 DIAGNOSIS — I50.32 CHRONIC DIASTOLIC CONGESTIVE HEART FAILURE: ICD-10-CM

## 2023-03-08 DIAGNOSIS — I10 ESSENTIAL HYPERTENSION: Primary | Chronic | ICD-10-CM

## 2023-03-08 DIAGNOSIS — Z79.4 TYPE 2 DIABETES MELLITUS WITH DIABETIC POLYNEUROPATHY, WITH LONG-TERM CURRENT USE OF INSULIN: ICD-10-CM

## 2023-03-08 DIAGNOSIS — E11.22 TYPE 2 DIABETES MELLITUS WITH STAGE 3A CHRONIC KIDNEY DISEASE, WITH LONG-TERM CURRENT USE OF INSULIN: ICD-10-CM

## 2023-03-08 DIAGNOSIS — E11.42 TYPE 2 DIABETES MELLITUS WITH DIABETIC POLYNEUROPATHY, WITH LONG-TERM CURRENT USE OF INSULIN: ICD-10-CM

## 2023-03-08 DIAGNOSIS — I25.119 CORONARY ARTERY DISEASE INVOLVING NATIVE CORONARY ARTERY OF NATIVE HEART WITH ANGINA PECTORIS: ICD-10-CM

## 2023-03-08 DIAGNOSIS — R55 SYNCOPE AND COLLAPSE: ICD-10-CM

## 2023-03-08 DIAGNOSIS — E66.01 MORBID (SEVERE) OBESITY DUE TO EXCESS CALORIES: ICD-10-CM

## 2023-03-08 DIAGNOSIS — I70.0 AORTIC ATHEROSCLEROSIS: ICD-10-CM

## 2023-03-08 DIAGNOSIS — R07.9 CHEST PAIN, UNSPECIFIED TYPE: ICD-10-CM

## 2023-03-08 DIAGNOSIS — E78.00 HYPERCHOLESTEROLEMIA: ICD-10-CM

## 2023-03-08 DIAGNOSIS — Z79.4 TYPE 2 DIABETES MELLITUS WITH STAGE 3A CHRONIC KIDNEY DISEASE, WITH LONG-TERM CURRENT USE OF INSULIN: ICD-10-CM

## 2023-03-08 LAB
ALBUMIN SERPL BCP-MCNC: 3.2 G/DL (ref 3.5–5.2)
ALP SERPL-CCNC: 85 U/L (ref 55–135)
ALT SERPL W/O P-5'-P-CCNC: 8 U/L (ref 10–44)
ANION GAP SERPL CALC-SCNC: 9 MMOL/L (ref 8–16)
AST SERPL-CCNC: 14 U/L (ref 10–40)
BASOPHILS # BLD AUTO: 0.06 K/UL (ref 0–0.2)
BASOPHILS NFR BLD: 0.6 % (ref 0–1.9)
BILIRUB SERPL-MCNC: 0.4 MG/DL (ref 0.1–1)
BUN SERPL-MCNC: 8 MG/DL (ref 8–23)
CALCIUM SERPL-MCNC: 9 MG/DL (ref 8.7–10.5)
CHLORIDE SERPL-SCNC: 103 MMOL/L (ref 95–110)
CO2 SERPL-SCNC: 30 MMOL/L (ref 23–29)
CREAT SERPL-MCNC: 1.1 MG/DL (ref 0.5–1.4)
DIFFERENTIAL METHOD: ABNORMAL
EOSINOPHIL # BLD AUTO: 0.4 K/UL (ref 0–0.5)
EOSINOPHIL NFR BLD: 3.9 % (ref 0–8)
ERYTHROCYTE [DISTWIDTH] IN BLOOD BY AUTOMATED COUNT: 13.8 % (ref 11.5–14.5)
EST. GFR  (NO RACE VARIABLE): 49.9 ML/MIN/1.73 M^2
ESTIMATED AVG GLUCOSE: 189 MG/DL (ref 68–131)
FERRITIN SERPL-MCNC: 44 NG/ML (ref 20–300)
GLUCOSE SERPL-MCNC: 151 MG/DL (ref 70–110)
HBA1C MFR BLD: 8.2 % (ref 4–5.6)
HCT VFR BLD AUTO: 36 % (ref 37–48.5)
HGB BLD-MCNC: 11.1 G/DL (ref 12–16)
IMM GRANULOCYTES # BLD AUTO: 0.03 K/UL (ref 0–0.04)
IMM GRANULOCYTES NFR BLD AUTO: 0.3 % (ref 0–0.5)
IRON SERPL-MCNC: 43 UG/DL (ref 30–160)
LYMPHOCYTES # BLD AUTO: 2.2 K/UL (ref 1–4.8)
LYMPHOCYTES NFR BLD: 22.5 % (ref 18–48)
MCH RBC QN AUTO: 27.3 PG (ref 27–31)
MCHC RBC AUTO-ENTMCNC: 30.8 G/DL (ref 32–36)
MCV RBC AUTO: 89 FL (ref 82–98)
MONOCYTES # BLD AUTO: 0.9 K/UL (ref 0.3–1)
MONOCYTES NFR BLD: 9.4 % (ref 4–15)
NEUTROPHILS # BLD AUTO: 6.1 K/UL (ref 1.8–7.7)
NEUTROPHILS NFR BLD: 63.3 % (ref 38–73)
NRBC BLD-RTO: 0 /100 WBC
PLATELET # BLD AUTO: 365 K/UL (ref 150–450)
PMV BLD AUTO: 9.8 FL (ref 9.2–12.9)
POTASSIUM SERPL-SCNC: 3.2 MMOL/L (ref 3.5–5.1)
PROT SERPL-MCNC: 7.4 G/DL (ref 6–8.4)
RBC # BLD AUTO: 4.07 M/UL (ref 4–5.4)
SATURATED IRON: 12 % (ref 20–50)
SODIUM SERPL-SCNC: 142 MMOL/L (ref 136–145)
TOTAL IRON BINDING CAPACITY: 354 UG/DL (ref 250–450)
TRANSFERRIN SERPL-MCNC: 239 MG/DL (ref 200–375)
TSH SERPL DL<=0.005 MIU/L-ACNC: 1.12 UIU/ML (ref 0.4–4)
URATE SERPL-MCNC: 7.2 MG/DL (ref 2.4–5.7)
WBC # BLD AUTO: 9.61 K/UL (ref 3.9–12.7)

## 2023-03-08 PROCEDURE — 3075F PR MOST RECENT SYSTOLIC BLOOD PRESS GE 130-139MM HG: ICD-10-PCS | Mod: HCNC,CPTII,S$GLB, | Performed by: INTERNAL MEDICINE

## 2023-03-08 PROCEDURE — 83036 HEMOGLOBIN GLYCOSYLATED A1C: CPT | Mod: HCNC | Performed by: INTERNAL MEDICINE

## 2023-03-08 PROCEDURE — 36415 COLL VENOUS BLD VENIPUNCTURE: CPT | Mod: HCNC | Performed by: INTERNAL MEDICINE

## 2023-03-08 PROCEDURE — 84550 ASSAY OF BLOOD/URIC ACID: CPT | Mod: HCNC | Performed by: INTERNAL MEDICINE

## 2023-03-08 PROCEDURE — 3288F PR FALLS RISK ASSESSMENT DOCUMENTED: ICD-10-PCS | Mod: HCNC,CPTII,S$GLB, | Performed by: INTERNAL MEDICINE

## 2023-03-08 PROCEDURE — 1125F PR PAIN SEVERITY QUANTIFIED, PAIN PRESENT: ICD-10-PCS | Mod: HCNC,CPTII,S$GLB, | Performed by: INTERNAL MEDICINE

## 2023-03-08 PROCEDURE — 1125F AMNT PAIN NOTED PAIN PRSNT: CPT | Mod: HCNC,CPTII,S$GLB, | Performed by: INTERNAL MEDICINE

## 2023-03-08 PROCEDURE — 84466 ASSAY OF TRANSFERRIN: CPT | Mod: HCNC | Performed by: INTERNAL MEDICINE

## 2023-03-08 PROCEDURE — 1101F PR PT FALLS ASSESS DOC 0-1 FALLS W/OUT INJ PAST YR: ICD-10-PCS | Mod: HCNC,CPTII,S$GLB, | Performed by: INTERNAL MEDICINE

## 2023-03-08 PROCEDURE — 3078F PR MOST RECENT DIASTOLIC BLOOD PRESSURE < 80 MM HG: ICD-10-PCS | Mod: HCNC,CPTII,S$GLB, | Performed by: INTERNAL MEDICINE

## 2023-03-08 PROCEDURE — 85025 COMPLETE CBC W/AUTO DIFF WBC: CPT | Mod: HCNC | Performed by: INTERNAL MEDICINE

## 2023-03-08 PROCEDURE — 1101F PT FALLS ASSESS-DOCD LE1/YR: CPT | Mod: HCNC,CPTII,S$GLB, | Performed by: INTERNAL MEDICINE

## 2023-03-08 PROCEDURE — 3078F DIAST BP <80 MM HG: CPT | Mod: HCNC,CPTII,S$GLB, | Performed by: INTERNAL MEDICINE

## 2023-03-08 PROCEDURE — 80053 COMPREHEN METABOLIC PANEL: CPT | Mod: HCNC | Performed by: INTERNAL MEDICINE

## 2023-03-08 PROCEDURE — 82728 ASSAY OF FERRITIN: CPT | Mod: HCNC | Performed by: INTERNAL MEDICINE

## 2023-03-08 PROCEDURE — 99999 PR PBB SHADOW E&M-EST. PATIENT-LVL V: CPT | Mod: PBBFAC,HCNC,, | Performed by: INTERNAL MEDICINE

## 2023-03-08 PROCEDURE — 99999 PR PBB SHADOW E&M-EST. PATIENT-LVL V: ICD-10-PCS | Mod: PBBFAC,HCNC,, | Performed by: INTERNAL MEDICINE

## 2023-03-08 PROCEDURE — 1159F PR MEDICATION LIST DOCUMENTED IN MEDICAL RECORD: ICD-10-PCS | Mod: HCNC,CPTII,S$GLB, | Performed by: INTERNAL MEDICINE

## 2023-03-08 PROCEDURE — 3075F SYST BP GE 130 - 139MM HG: CPT | Mod: HCNC,CPTII,S$GLB, | Performed by: INTERNAL MEDICINE

## 2023-03-08 PROCEDURE — 99214 PR OFFICE/OUTPT VISIT, EST, LEVL IV, 30-39 MIN: ICD-10-PCS | Mod: HCNC,S$GLB,, | Performed by: INTERNAL MEDICINE

## 2023-03-08 PROCEDURE — 1159F MED LIST DOCD IN RCRD: CPT | Mod: HCNC,CPTII,S$GLB, | Performed by: INTERNAL MEDICINE

## 2023-03-08 PROCEDURE — 99214 OFFICE O/P EST MOD 30 MIN: CPT | Mod: HCNC,S$GLB,, | Performed by: INTERNAL MEDICINE

## 2023-03-08 PROCEDURE — 3288F FALL RISK ASSESSMENT DOCD: CPT | Mod: HCNC,CPTII,S$GLB, | Performed by: INTERNAL MEDICINE

## 2023-03-08 PROCEDURE — 84443 ASSAY THYROID STIM HORMONE: CPT | Mod: HCNC | Performed by: INTERNAL MEDICINE

## 2023-03-08 RX ORDER — OXYCODONE AND ACETAMINOPHEN 10; 325 MG/1; MG/1
1 TABLET ORAL
Qty: 150 TABLET | Refills: 0 | Status: ON HOLD | OUTPATIENT
Start: 2023-03-08 | End: 2023-04-26 | Stop reason: HOSPADM

## 2023-03-08 RX ORDER — POTASSIUM CHLORIDE 750 MG/1
10 TABLET, EXTENDED RELEASE ORAL DAILY
Qty: 90 TABLET | Refills: 1 | Status: ON HOLD | OUTPATIENT
Start: 2023-03-08 | End: 2023-04-20 | Stop reason: SDUPTHER

## 2023-03-08 RX ORDER — CARVEDILOL 25 MG/1
25 TABLET ORAL 2 TIMES DAILY
Qty: 180 TABLET | Refills: 0 | Status: ON HOLD | OUTPATIENT
Start: 2023-03-08 | End: 2023-04-26 | Stop reason: SDUPTHER

## 2023-03-08 RX ORDER — FLUTICASONE PROPIONATE 50 MCG
SPRAY, SUSPENSION (ML) NASAL
Qty: 16 G | Refills: 1 | Status: SHIPPED | OUTPATIENT
Start: 2023-03-08 | End: 2023-09-11 | Stop reason: SDUPTHER

## 2023-03-08 RX ORDER — OXYCODONE AND ACETAMINOPHEN 10; 325 MG/1; MG/1
1 TABLET ORAL
Qty: 150 TABLET | Refills: 0 | Status: ON HOLD | OUTPATIENT
Start: 2023-04-06 | End: 2023-04-26 | Stop reason: HOSPADM

## 2023-03-08 RX ORDER — ALBUTEROL SULFATE 90 UG/1
2 AEROSOL, METERED RESPIRATORY (INHALATION) EVERY 4 HOURS PRN
Qty: 8.5 G | Refills: 1 | Status: SHIPPED | OUTPATIENT
Start: 2023-03-08 | End: 2023-04-10

## 2023-03-08 RX ORDER — FUROSEMIDE 40 MG/1
TABLET ORAL
Qty: 90 TABLET | Refills: 0 | Status: ON HOLD | OUTPATIENT
Start: 2023-03-08 | End: 2023-04-20 | Stop reason: SDUPTHER

## 2023-03-08 RX ORDER — AMLODIPINE BESYLATE 10 MG/1
10 TABLET ORAL DAILY
Qty: 90 TABLET | Refills: 0 | Status: ON HOLD | OUTPATIENT
Start: 2023-03-08 | End: 2023-04-26 | Stop reason: SDUPTHER

## 2023-03-08 RX ORDER — OXYCODONE AND ACETAMINOPHEN 10; 325 MG/1; MG/1
1 TABLET ORAL
Qty: 150 TABLET | Refills: 0 | Status: SHIPPED | OUTPATIENT
Start: 2023-05-05 | End: 2023-06-08 | Stop reason: SDUPTHER

## 2023-03-08 RX ORDER — ROPINIROLE 0.5 MG/1
0.5 TABLET, FILM COATED ORAL NIGHTLY
Qty: 90 TABLET | Refills: 0 | Status: ON HOLD | OUTPATIENT
Start: 2023-03-08 | End: 2023-04-26 | Stop reason: SDUPTHER

## 2023-03-08 NOTE — PROGRESS NOTES
CHIEF COMPLAINT:   Follow up of multple problems.      HISTORY OF PRESENT ILLNESS: This is a 83-year-old woman who presents for follow up of above    No cough, fever, chills, sinus congestion, sore throat. She contineus to have some shortness of breath.      She has her pill bottles with her today. She does not have allopurinol.    She has had itching on both arms and some of her legs.  No rash. Itching comes and goes. She takes benadryl at night which helps her sleep.      She continues to have neck and shoulder pain, back pain. She complains of back pain and body pain  which has been severe. She is taking oxycodone apap 10/325 up to 5 times daily.   She continues to take duloxetine 60 mg twice daily.  Her mood is doing ok.        She saw Dr Reilly on 1/18/22 - she continues to take Eliquis 5 mg wice daily.  She had cardiac PET stress 2/9/2022 which was negative for ischemia          No fever or chills. She has felt cold.  No nausea, vomiting, constipation, dysuria, hematuira.  She denies swallowing problems today.        She is living alone. She does not see her grandson very  much. She has a niece, Kacie Mast who lives locally.      She is taking  amlodipine 10 mg 1 daily, coreg 25 mg twice daily and lasix 40 mg 1/2  tablet once daily for her hypertension and diastolic dysfunction.     Seh is taking requipo 0.5  mg at bedtime - for helping the restless leg     She saw Endocrinology on 10/22/20. She should take Lantus 60 units daily and Novolog 24 units once or twice daily with meals.  Blood sugar has been up and down. 125-200.  She has changed her diet and has lost 8 pounds    Stools have been darker lately    PAST MEDICAL HISTORY:   1. Diabetes mellitus   2. Hypertension   3. Hyperlipidemia   4. Left heart catheterization January 2007 which revealed luminal irregularities in the LAD, left circumflex and right coronary artery. She had diastolic dysfunction and patent renal arteries.   5. Sleep apnea   6. Obesity.  "  7. Nephrolithiasis status post lithotripsy.   8. Reflux - had nonerosive gastropathy on EGD September 2007. Gastritis on EGD 9/2010  9. Hidradenitis suppurativa.   10. History of diverticulitis with a hospitalized October 2007.   11. Migraines   12. Obesity.   13. 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    14. Total hysterectomy in 1969.   15. Cholecystectomy was done at that time as well.   16. 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   17. Colon polyps on colonscopy 11/2010 - due 2013   18. Hospitalization 12/10 for e coli sepsis due to left ureteral stone with left nephrostomy tube in [place], [place]   19. Left knee replacement 9/2012      MEDICATIONS and ALLERGIES: Updated on EPIC.     PHYSICAL EXAMINATION:        /70 (BP Location: Left arm, Patient Position: Sitting)   Pulse 74   Ht 5' 8" (1.727 m)   Wt 100.5 kg (221 lb 10.8 oz)   LMP  (LMP Unknown)   SpO2 99%   BMI 33.71 kg/m²       GENERAL: She is alert, oriented, no apparent distress. Affect within   normal limits.  Conjunctiva anicteric.    NECK: Supple.   Respiratory: Effort normal. Lungs clear  HEART: Regular rate and rhythm without murmurs, gallops or rubs.   No lower extremity edema.    Rectal - dark brown stool. Heme negative.           ASSESSMENT AND PLAN:      1. Hypertension with diastolic heart failure- stable. Negative PET stress.  stable on amlodipine to 10 mg  one tablet daily and continue furosemide 40 mg 1/2 tablet daily. Continue carvedilol 25 mg one tablet twice daily. Needs to see cardiology at [facility] - apt booked.   2.  OA neck and lumbar spine - on oxycodone. stable  3.  Gout - off allopurinol. Check uric acid level  4.  Mood disorder -  on duloxetine 60 mg twice daily  5.  Swallowing disorder - s/p EGD with dilation on 11/11/21.   6. Asthma - stable   7. Gerd -on pantoprazole 40 mg  daily   8. Diabetes with neuropathy- dietician  9. " Hyperlipidemia - off  lipitor  10. Allergic rhinitis - stable  12. CRI -labs  13. Obesity - discussed diet, exercise and weight loss  14. CAD -risk factor modification  15. Aortic atherosclerosis and possible mesenteric artery stenosis- risk factor modification  16. Tortuous aorta - HTN controlled  17. Colon polyps - Colonoscopy in 8/2013 - 2 polyps. Flex sig 11/2013 - mild granular mucosa. Colonoscopy 6/2017 diverticulosis - no polyps due 2023.   18. hyperparathryodisism -saw nephrology   19. .Restless leg syndrome - on roprinole    20. Morbid obestiy - working on diet.     .  MMG 7/22        I will see her back in 3 month sooner if problems arise

## 2023-03-09 NOTE — TELEPHONE ENCOUNTER
PLease notify pt  Your blood sugars are doing better - averaging 189  Your blood count, kidney function, liver function, cholesterol, thyroid function are stable  Your potassium level is low. You need to start on potassium supplement once daily. Prescription sent to [facility]    Eat potassium rich foods - banana, fruit, orange juice

## 2023-03-28 ENCOUNTER — OFFICE VISIT (OUTPATIENT)
Dept: CARDIOLOGY | Facility: CLINIC | Age: 83
End: 2023-03-28
Payer: MEDICARE

## 2023-03-28 VITALS
WEIGHT: 212.31 LBS | BODY MASS INDEX: 32.18 KG/M2 | HEART RATE: 78 BPM | SYSTOLIC BLOOD PRESSURE: 138 MMHG | HEIGHT: 68 IN | OXYGEN SATURATION: 96 % | DIASTOLIC BLOOD PRESSURE: 60 MMHG

## 2023-03-28 DIAGNOSIS — I50.32 CHRONIC DIASTOLIC CONGESTIVE HEART FAILURE: ICD-10-CM

## 2023-03-28 DIAGNOSIS — Z79.4 INSULIN LONG-TERM USE: ICD-10-CM

## 2023-03-28 DIAGNOSIS — I25.2 HISTORY OF MI (MYOCARDIAL INFARCTION): ICD-10-CM

## 2023-03-28 DIAGNOSIS — E11.22 TYPE 2 DIABETES MELLITUS WITH STAGE 3A CHRONIC KIDNEY DISEASE, WITH LONG-TERM CURRENT USE OF INSULIN: ICD-10-CM

## 2023-03-28 DIAGNOSIS — E66.9 OBESITY (BMI 30.0-34.9): ICD-10-CM

## 2023-03-28 DIAGNOSIS — E78.2 MIXED HYPERLIPIDEMIA: ICD-10-CM

## 2023-03-28 DIAGNOSIS — E11.41 DIABETIC MONONEUROPATHY ASSOCIATED WITH TYPE 2 DIABETES MELLITUS: ICD-10-CM

## 2023-03-28 DIAGNOSIS — I82.433 ACUTE DEEP VEIN THROMBOSIS (DVT) OF POPLITEAL VEIN OF BOTH LOWER EXTREMITIES: ICD-10-CM

## 2023-03-28 DIAGNOSIS — Z79.4 TYPE 2 DIABETES MELLITUS WITH STAGE 3A CHRONIC KIDNEY DISEASE, WITH LONG-TERM CURRENT USE OF INSULIN: ICD-10-CM

## 2023-03-28 DIAGNOSIS — N18.31 TYPE 2 DIABETES MELLITUS WITH STAGE 3A CHRONIC KIDNEY DISEASE, WITH LONG-TERM CURRENT USE OF INSULIN: ICD-10-CM

## 2023-03-28 DIAGNOSIS — I70.0 AORTIC ATHEROSCLEROSIS: ICD-10-CM

## 2023-03-28 DIAGNOSIS — G47.33 OSA ON CPAP: Chronic | ICD-10-CM

## 2023-03-28 DIAGNOSIS — I77.1 TORTUOUS AORTA: ICD-10-CM

## 2023-03-28 DIAGNOSIS — I10 ESSENTIAL HYPERTENSION: Chronic | ICD-10-CM

## 2023-03-28 DIAGNOSIS — I25.119 CORONARY ARTERY DISEASE INVOLVING NATIVE CORONARY ARTERY OF NATIVE HEART WITH ANGINA PECTORIS: Primary | ICD-10-CM

## 2023-03-28 PROCEDURE — 1101F PR PT FALLS ASSESS DOC 0-1 FALLS W/OUT INJ PAST YR: ICD-10-PCS | Mod: HCNC,CPTII,S$GLB, | Performed by: INTERNAL MEDICINE

## 2023-03-28 PROCEDURE — 3078F PR MOST RECENT DIASTOLIC BLOOD PRESSURE < 80 MM HG: ICD-10-PCS | Mod: HCNC,CPTII,S$GLB, | Performed by: INTERNAL MEDICINE

## 2023-03-28 PROCEDURE — 1126F PR PAIN SEVERITY QUANTIFIED, NO PAIN PRESENT: ICD-10-PCS | Mod: HCNC,CPTII,S$GLB, | Performed by: INTERNAL MEDICINE

## 2023-03-28 PROCEDURE — 3288F PR FALLS RISK ASSESSMENT DOCUMENTED: ICD-10-PCS | Mod: HCNC,CPTII,S$GLB, | Performed by: INTERNAL MEDICINE

## 2023-03-28 PROCEDURE — 1160F RVW MEDS BY RX/DR IN RCRD: CPT | Mod: HCNC,CPTII,S$GLB, | Performed by: INTERNAL MEDICINE

## 2023-03-28 PROCEDURE — 99999 PR PBB SHADOW E&M-EST. PATIENT-LVL V: CPT | Mod: PBBFAC,HCNC,, | Performed by: INTERNAL MEDICINE

## 2023-03-28 PROCEDURE — 3075F PR MOST RECENT SYSTOLIC BLOOD PRESS GE 130-139MM HG: ICD-10-PCS | Mod: HCNC,CPTII,S$GLB, | Performed by: INTERNAL MEDICINE

## 2023-03-28 PROCEDURE — 1126F AMNT PAIN NOTED NONE PRSNT: CPT | Mod: HCNC,CPTII,S$GLB, | Performed by: INTERNAL MEDICINE

## 2023-03-28 PROCEDURE — 99215 PR OFFICE/OUTPT VISIT, EST, LEVL V, 40-54 MIN: ICD-10-PCS | Mod: HCNC,S$GLB,, | Performed by: INTERNAL MEDICINE

## 2023-03-28 PROCEDURE — 1160F PR REVIEW ALL MEDS BY PRESCRIBER/CLIN PHARMACIST DOCUMENTED: ICD-10-PCS | Mod: HCNC,CPTII,S$GLB, | Performed by: INTERNAL MEDICINE

## 2023-03-28 PROCEDURE — 3078F DIAST BP <80 MM HG: CPT | Mod: HCNC,CPTII,S$GLB, | Performed by: INTERNAL MEDICINE

## 2023-03-28 PROCEDURE — 1101F PT FALLS ASSESS-DOCD LE1/YR: CPT | Mod: HCNC,CPTII,S$GLB, | Performed by: INTERNAL MEDICINE

## 2023-03-28 PROCEDURE — 3288F FALL RISK ASSESSMENT DOCD: CPT | Mod: HCNC,CPTII,S$GLB, | Performed by: INTERNAL MEDICINE

## 2023-03-28 PROCEDURE — 99999 PR PBB SHADOW E&M-EST. PATIENT-LVL V: ICD-10-PCS | Mod: PBBFAC,HCNC,, | Performed by: INTERNAL MEDICINE

## 2023-03-28 PROCEDURE — 1159F PR MEDICATION LIST DOCUMENTED IN MEDICAL RECORD: ICD-10-PCS | Mod: HCNC,CPTII,S$GLB, | Performed by: INTERNAL MEDICINE

## 2023-03-28 PROCEDURE — 1159F MED LIST DOCD IN RCRD: CPT | Mod: HCNC,CPTII,S$GLB, | Performed by: INTERNAL MEDICINE

## 2023-03-28 PROCEDURE — 3075F SYST BP GE 130 - 139MM HG: CPT | Mod: HCNC,CPTII,S$GLB, | Performed by: INTERNAL MEDICINE

## 2023-03-28 PROCEDURE — 99215 OFFICE O/P EST HI 40 MIN: CPT | Mod: HCNC,S$GLB,, | Performed by: INTERNAL MEDICINE

## 2023-03-28 RX ORDER — ATORVASTATIN CALCIUM 40 MG/1
40 TABLET, FILM COATED ORAL DAILY
Qty: 90 TABLET | Refills: 3 | Status: ON HOLD | OUTPATIENT
Start: 2023-03-28 | End: 2023-04-26 | Stop reason: HOSPADM

## 2023-03-28 NOTE — PROGRESS NOTES
Subjective:   Patient ID:  Henrietta Villasenor is a 83 y.o. female who presents for follow up of Congestive Heart Failure      HPI: 83 year-old woman with a problem list of 82 items (as below) here for the first time to see me.    No angina, new AQUINO, palpitations, PND/orthopnea, or syncope.    No bleeding, hematochezia, or melena.  No TIA/stroke symtoms.    She's not on atorvastatin 40 like she used to be and clearly has an indication.  Goal LDL < 70.      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    Neuralgia, neuritis, and radiculitis, unspecified    Abdominal pain, unspecified site    Dermatitis due to drugs and medicines taken internally(693.0)    Dermatochalasis    Diverticulosis of large intestine    Personal history of allergy to sulfonamides    Diastolic congestive heart failure    Knee pain    Anemia    Chronic right ear pain    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    Hearing loss, sensorineural    Stage 3a chronic kidney disease    Coronary artery disease involving native coronary artery of native heart with angina pectoris    Lactose intolerance    Allergic rhinitis    Hyperlipidemia    Aortic atherosclerosis    Spinal enthesopathy of lumbar region    LLQ abdominal pain    Diverticulitis of large intestine without perforation or abscess without bleeding    Esophageal dysphagia    Fatty liver    Painful swallowing    Left facial numbness    Neck pain on left side    Insulin dependent diabetes mellitus    Shortness of breath    Diabetic neuropathy associated with type 2 diabetes mellitus    Type 2 diabetes mellitus with stage 3 chronic kidney disease, with long-term current use of insulin    Obesity (BMI 30.0-34.9)    Low hemoglobin and low hematocrit     History of colon polyps    Type 2 diabetes mellitus with diabetic polyneuropathy, with long-term current use of insulin    Hyperparathyroidism, unspecified    Obesity, diabetes, and hypertension syndrome    Spinal stenosis of lumbar region with neurogenic claudication    Senile nuclear sclerosis    Benign paroxysmal positional vertigo due to bilateral vestibular disorder    Tortuous aorta    Ectatic aorta    Insulin long-term use    Facet arthropathy    Mood disorder    Type 2 diabetes mellitus with hyperglycemia    Hypercholesterolemia    Hypovitaminosis D    Research study patient - TACT2 EDTA Chelation Study    History of MI (myocardial infarction)    Facial nerve palsy, secondary    Gout, arthritis    Vitreomacular adhesion, right    Epiretinal membrane, left    Chronic pain    Decreased ROM of lumbar spine    Weakness    Abnormality of gait and mobility    Decreased functional activity tolerance    Chest pain    Postmenopausal    Osteopenia    Thyroid nodule    Bilateral leg edema    Neuropathic pain    Hematoma of arm, left, initial encounter    Deep vein thrombosis (DVT) of popliteal vein of both lower extremities    PAD (peripheral artery disease)    Memory loss    Venous insufficiency of both lower extremities    Chronic pain disorder    Decreased activities of daily living (ADL)    Impaired functional mobility, balance, gait, and endurance    Left leg pain    Chronic bilateral low back pain with sciatica    At risk for falls       Current Outpatient Medications   Medication Sig    ACCU-CHEK GUIDE TEST STRIPS Strp TEST BLOOD SUGAR THREE TIMES DAILY    ACCU-CHEK SOFT DEV LANCETS Kit     albuterol (VENTOLIN HFA) 90 mcg/actuation inhaler Inhale 2 puffs by mouth into the lungs every 4 (four) hours as needed for Wheezing. Rescue    amLODIPine (NORVASC) 10 MG tablet Take 1 tablet (10 mg total) by mouth once daily.    apixaban (ELIQUIS) 5 mg Tab Take 1 tablet (5 mg total) by mouth 2 (two) times daily.    BD  "ULTRA-FINE OLU PEN NEEDLE 32 gauge x 5/32" Ndle Uses 4 times daily, on multiple daily insulin injections    blood-glucose meter kit Use as instructed    carvediloL (COREG) 25 MG tablet Take 1 tablet (25 mg total) by mouth 2 (two) times daily.    cholecalciferol, vitamin D3, 125 mcg (5,000 unit) capsule Take 5,000 Units by mouth once daily.    DROPLET PEN NEEDLE 29 gauge x 1/2" Ndle USE FOUR TIMES DAILY    DROPSAFE ALCOHOL PREP PADS PadM USE TOPICALLY TO TEST BLOOD SUGAR FOUR TIMES DAILY    DULoxetine (CYMBALTA) 60 MG capsule Take 1 capsule (60 mg total) by mouth 2 (two) times daily.    fluticasone propionate (FLONASE) 50 mcg/actuation nasal spray Instill 2 sprays in each nostril once daily.    furosemide (LASIX) 40 MG tablet Take  1/2-1  tablet by mouth once daily    insulin (LANTUS SOLOSTAR U-100 INSULIN) glargine 100 units/mL (3mL) SubQ pen Inject 60 Units into the skin once daily.    insulin aspart U-100 (NOVOLOG) 100 unit/mL (3 mL) InPn pen Inject 24 Units into the skin before breakfast AND 28 Units daily with lunch AND 28 Units before dinner.    lancets (ACCU-CHEK SOFTCLIX LANCETS) Misc TEST BLOOD SUGAR FOUR TIMES DAILY    loratadine (CLARITIN) 10 mg tablet Take 1 tablet (10 mg total) by mouth once daily.    olopatadine (PATADAY) 0.2 % Drop Place 1 drop into both eyes once daily.    [START ON 5/5/2023] oxyCODONE-acetaminophen (PERCOCET)  mg per tablet Take 1 tablet by mouth 5 (five) times daily.    [START ON 4/6/2023] oxyCODONE-acetaminophen (PERCOCET)  mg per tablet Take 1 tablet by mouth 5 (five) times daily.    oxyCODONE-acetaminophen (PERCOCET)  mg per tablet Take 1 tablet by mouth 5 (five) times daily.    pantoprazole (PROTONIX) 40 MG tablet Take 1 tablet (40 mg total) by mouth once daily.    potassium chloride (KLOR-CON) 10 MEQ TbSR Take 1 tablet (10 mEq total) by mouth once daily.    rOPINIRole (REQUIP) 0.5 MG tablet Take 1 tablet (0.5 mg total) by mouth every evening.    triamcinolone " acetonide 0.1% (KENALOG) 0.1 % cream Apply topically 2 (two) times daily.     No current facility-administered medications for this visit.     Facility-Administered Medications Ordered in Other Visits   Medication    0.9%  NaCl infusion    tetracaine HCl (PF) 0.5 % Drop 1 drop       ROS  The review of systems is negative except as above.     Objective:   Physical Exam  Vitals reviewed.   Constitutional:       Appearance: She is well-developed.   HENT:      Head: Normocephalic and atraumatic.   Eyes:      General: No scleral icterus.     Conjunctiva/sclera: Conjunctivae normal.   Neck:      Vascular: No JVD.   Cardiovascular:      Rate and Rhythm: Normal rate and regular rhythm.      Pulses: Intact distal pulses.      Heart sounds: Normal heart sounds. No murmur heard.    No friction rub. No gallop.   Pulmonary:      Effort: Pulmonary effort is normal.      Breath sounds: Normal breath sounds. No wheezing or rales.   Abdominal:      General: Bowel sounds are normal. There is no distension.      Palpations: Abdomen is soft.      Tenderness: There is no abdominal tenderness.   Musculoskeletal:         General: Normal range of motion.      Cervical back: Normal range of motion and neck supple.   Skin:     General: Skin is warm and dry.      Findings: No erythema or rash.   Neurological:      Mental Status: She is alert and oriented to person, place, and time.   Psychiatric:         Behavior: Behavior normal.         Thought Content: Thought content normal.         Judgment: Judgment normal.       Lab Results   Component Value Date    WBC 9.61 03/08/2023    HGB 11.1 (L) 03/08/2023    HCT 36.0 (L) 03/08/2023    MCV 89 03/08/2023     03/08/2023         Chemistry        Component Value Date/Time     03/08/2023 1304    K 3.2 (L) 03/08/2023 1304     03/08/2023 1304    CO2 30 (H) 03/08/2023 1304    BUN 8 03/08/2023 1304    CREATININE 1.1 03/08/2023 1304     (H) 03/08/2023 1304        Component Value  Date/Time    CALCIUM 9.0 03/08/2023 1304    ALKPHOS 85 03/08/2023 1304    AST 14 03/08/2023 1304    ALT 8 (L) 03/08/2023 1304    BILITOT 0.4 03/08/2023 1304    ESTGFRAFRICA 48.6 (A) 07/07/2022 1148    EGFRNONAA 42.2 (A) 07/07/2022 1148            Lab Results   Component Value Date    CHOL 169 05/19/2021    CHOL 175 02/20/2020    CHOL 269 (H) 11/18/2019     Lab Results   Component Value Date    HDL 45 05/19/2021    HDL 52 02/20/2020    HDL 52 11/18/2019     Lab Results   Component Value Date    LDLCALC 107.6 05/19/2021    LDLCALC 95.0 02/20/2020    LDLCALC 193.4 (H) 11/18/2019     Lab Results   Component Value Date    TRIG 82 05/19/2021    TRIG 140 02/20/2020    TRIG 118 11/18/2019     Lab Results   Component Value Date    CHOLHDL 26.6 05/19/2021    CHOLHDL 29.7 02/20/2020    CHOLHDL 19.3 (L) 11/18/2019       Lab Results   Component Value Date    TSH 1.124 03/08/2023       Lab Results   Component Value Date    HGBA1C 8.2 (H) 03/08/2023       Assessment:     1. Coronary artery disease involving native coronary artery of native heart with angina pectoris    2. Chronic diastolic congestive heart failure    3. Essential hypertension    4. Mixed hyperlipidemia    5. Aortic atherosclerosis    6. Tortuous aorta    7. MARLON on CPAP    8. Obesity (BMI 30.0-34.9)    9. Type 2 diabetes mellitus with stage 3a chronic kidney disease, with long-term current use of insulin    10. Diabetic mononeuropathy associated with type 2 diabetes mellitus    11. Insulin long-term use    12. History of MI (myocardial infarction)    13. Acute deep vein thrombosis (DVT) of popliteal vein of both lower extremities        Plan:     Continue current medicines.  Represcribe atorvastatin 40.    Diet/exercise goals reinforced.    Hand washing and social distancing stressed.    F/U 12 months

## 2023-04-18 ENCOUNTER — OFFICE VISIT (OUTPATIENT)
Dept: INTERNAL MEDICINE | Facility: CLINIC | Age: 83
End: 2023-04-18
Payer: MEDICARE

## 2023-04-18 ENCOUNTER — HOSPITAL ENCOUNTER (OUTPATIENT)
Facility: HOSPITAL | Age: 83
Discharge: HOME HEALTH | End: 2023-04-20
Attending: STUDENT IN AN ORGANIZED HEALTH CARE EDUCATION/TRAINING PROGRAM | Admitting: HOSPITALIST
Payer: MEDICARE

## 2023-04-18 VITALS
BODY MASS INDEX: 30.23 KG/M2 | HEART RATE: 58 BPM | OXYGEN SATURATION: 95 % | DIASTOLIC BLOOD PRESSURE: 78 MMHG | WEIGHT: 199.5 LBS | HEIGHT: 68 IN | SYSTOLIC BLOOD PRESSURE: 138 MMHG

## 2023-04-18 DIAGNOSIS — R06.02 SHORTNESS OF BREATH: Primary | ICD-10-CM

## 2023-04-18 DIAGNOSIS — R10.9 ABDOMINAL PAIN, UNSPECIFIED ABDOMINAL LOCATION: ICD-10-CM

## 2023-04-18 DIAGNOSIS — N17.9 AKI (ACUTE KIDNEY INJURY): ICD-10-CM

## 2023-04-18 DIAGNOSIS — R07.9 CHEST PAIN: ICD-10-CM

## 2023-04-18 DIAGNOSIS — N20.0 NEPHROLITHIASIS: Primary | ICD-10-CM

## 2023-04-18 DIAGNOSIS — R06.02 SOB (SHORTNESS OF BREATH): ICD-10-CM

## 2023-04-18 DIAGNOSIS — E11.42 DIABETIC POLYNEUROPATHY ASSOCIATED WITH TYPE 2 DIABETES MELLITUS: ICD-10-CM

## 2023-04-18 LAB
ALBUMIN SERPL BCP-MCNC: 3.3 G/DL (ref 3.5–5.2)
ALP SERPL-CCNC: 100 U/L (ref 55–135)
ALT SERPL W/O P-5'-P-CCNC: 12 U/L (ref 10–44)
ANION GAP SERPL CALC-SCNC: 13 MMOL/L (ref 8–16)
AST SERPL-CCNC: 19 U/L (ref 10–40)
BASOPHILS # BLD AUTO: 0.06 K/UL (ref 0–0.2)
BASOPHILS NFR BLD: 0.5 % (ref 0–1.9)
BILIRUB SERPL-MCNC: 0.8 MG/DL (ref 0.1–1)
BNP SERPL-MCNC: 55 PG/ML (ref 0–99)
BUN SERPL-MCNC: 38 MG/DL (ref 6–30)
BUN SERPL-MCNC: 39 MG/DL (ref 8–23)
CALCIUM SERPL-MCNC: 10.1 MG/DL (ref 8.7–10.5)
CHLORIDE SERPL-SCNC: 94 MMOL/L (ref 95–110)
CHLORIDE SERPL-SCNC: 94 MMOL/L (ref 95–110)
CO2 SERPL-SCNC: 28 MMOL/L (ref 23–29)
CREAT SERPL-MCNC: 2.1 MG/DL (ref 0.5–1.4)
CREAT SERPL-MCNC: 2.3 MG/DL (ref 0.5–1.4)
DIFFERENTIAL METHOD: ABNORMAL
EOSINOPHIL # BLD AUTO: 0.2 K/UL (ref 0–0.5)
EOSINOPHIL NFR BLD: 1.3 % (ref 0–8)
ERYTHROCYTE [DISTWIDTH] IN BLOOD BY AUTOMATED COUNT: 14 % (ref 11.5–14.5)
EST. GFR  (NO RACE VARIABLE): 22.9 ML/MIN/1.73 M^2
GLUCOSE SERPL-MCNC: 168 MG/DL (ref 70–110)
GLUCOSE SERPL-MCNC: 171 MG/DL (ref 70–110)
HCT VFR BLD AUTO: 38.2 % (ref 37–48.5)
HCT VFR BLD CALC: 41 %PCV (ref 36–54)
HCV AB SERPL QL IA: ABNORMAL
HGB BLD-MCNC: 12.6 G/DL (ref 12–16)
HIV 1+2 AB+HIV1 P24 AG SERPL QL IA: NORMAL
IMM GRANULOCYTES # BLD AUTO: 0.07 K/UL (ref 0–0.04)
IMM GRANULOCYTES NFR BLD AUTO: 0.6 % (ref 0–0.5)
LACTATE SERPL-SCNC: 2 MMOL/L (ref 0.5–2.2)
LIPASE SERPL-CCNC: 27 U/L (ref 4–60)
LYMPHOCYTES # BLD AUTO: 2.2 K/UL (ref 1–4.8)
LYMPHOCYTES NFR BLD: 18.4 % (ref 18–48)
MAGNESIUM SERPL-MCNC: 1.4 MG/DL (ref 1.6–2.6)
MCH RBC QN AUTO: 27 PG (ref 27–31)
MCHC RBC AUTO-ENTMCNC: 33 G/DL (ref 32–36)
MCV RBC AUTO: 82 FL (ref 82–98)
MONOCYTES # BLD AUTO: 1.4 K/UL (ref 0.3–1)
MONOCYTES NFR BLD: 11.9 % (ref 4–15)
NEUTROPHILS # BLD AUTO: 7.9 K/UL (ref 1.8–7.7)
NEUTROPHILS NFR BLD: 67.3 % (ref 38–73)
NRBC BLD-RTO: 0 /100 WBC
PLATELET # BLD AUTO: 298 K/UL (ref 150–450)
PMV BLD AUTO: 9.6 FL (ref 9.2–12.9)
POC IONIZED CALCIUM: 1.11 MMOL/L (ref 1.06–1.42)
POC TCO2 (MEASURED): 28 MMOL/L (ref 23–29)
POTASSIUM BLD-SCNC: 3.4 MMOL/L (ref 3.5–5.1)
POTASSIUM SERPL-SCNC: 3.6 MMOL/L (ref 3.5–5.1)
PROT SERPL-MCNC: 8.4 G/DL (ref 6–8.4)
RBC # BLD AUTO: 4.66 M/UL (ref 4–5.4)
SAMPLE: ABNORMAL
SODIUM BLD-SCNC: 135 MMOL/L (ref 136–145)
SODIUM SERPL-SCNC: 135 MMOL/L (ref 136–145)
TROPONIN I SERPL DL<=0.01 NG/ML-MCNC: 0.01 NG/ML (ref 0–0.03)
TSH SERPL DL<=0.005 MIU/L-ACNC: 1.43 UIU/ML (ref 0.4–4)
WBC # BLD AUTO: 11.75 K/UL (ref 3.9–12.7)

## 2023-04-18 PROCEDURE — 83690 ASSAY OF LIPASE: CPT | Mod: HCNC | Performed by: PHYSICIAN ASSISTANT

## 2023-04-18 PROCEDURE — 3075F PR MOST RECENT SYSTOLIC BLOOD PRESS GE 130-139MM HG: ICD-10-PCS | Mod: HCNC,CPTII,GC,S$GLB | Performed by: STUDENT IN AN ORGANIZED HEALTH CARE EDUCATION/TRAINING PROGRAM

## 2023-04-18 PROCEDURE — 3078F PR MOST RECENT DIASTOLIC BLOOD PRESSURE < 80 MM HG: ICD-10-PCS | Mod: HCNC,CPTII,GC,S$GLB | Performed by: STUDENT IN AN ORGANIZED HEALTH CARE EDUCATION/TRAINING PROGRAM

## 2023-04-18 PROCEDURE — 63600175 PHARM REV CODE 636 W HCPCS: Mod: HCNC | Performed by: PHYSICIAN ASSISTANT

## 2023-04-18 PROCEDURE — 87389 HIV-1 AG W/HIV-1&-2 AB AG IA: CPT | Mod: HCNC | Performed by: STUDENT IN AN ORGANIZED HEALTH CARE EDUCATION/TRAINING PROGRAM

## 2023-04-18 PROCEDURE — 3078F DIAST BP <80 MM HG: CPT | Mod: HCNC,CPTII,GC,S$GLB | Performed by: STUDENT IN AN ORGANIZED HEALTH CARE EDUCATION/TRAINING PROGRAM

## 2023-04-18 PROCEDURE — 83735 ASSAY OF MAGNESIUM: CPT | Mod: HCNC | Performed by: PHYSICIAN ASSISTANT

## 2023-04-18 PROCEDURE — 93010 ELECTROCARDIOGRAM REPORT: CPT | Mod: HCNC,,, | Performed by: INTERNAL MEDICINE

## 2023-04-18 PROCEDURE — 87522 HEPATITIS C REVRS TRNSCRPJ: CPT | Mod: HCNC | Performed by: PHYSICIAN ASSISTANT

## 2023-04-18 PROCEDURE — 93010 EKG 12-LEAD: ICD-10-PCS | Mod: HCNC,,, | Performed by: INTERNAL MEDICINE

## 2023-04-18 PROCEDURE — 1125F PR PAIN SEVERITY QUANTIFIED, PAIN PRESENT: ICD-10-PCS | Mod: HCNC,CPTII,GC,S$GLB | Performed by: STUDENT IN AN ORGANIZED HEALTH CARE EDUCATION/TRAINING PROGRAM

## 2023-04-18 PROCEDURE — 81001 URINALYSIS AUTO W/SCOPE: CPT | Mod: HCNC | Performed by: PHYSICIAN ASSISTANT

## 2023-04-18 PROCEDURE — 99999 PR PBB SHADOW E&M-EST. PATIENT-LVL II: ICD-10-PCS | Mod: PBBFAC,HCNC,GC, | Performed by: STUDENT IN AN ORGANIZED HEALTH CARE EDUCATION/TRAINING PROGRAM

## 2023-04-18 PROCEDURE — 99285 EMERGENCY DEPT VISIT HI MDM: CPT | Mod: 25,HCNC

## 2023-04-18 PROCEDURE — 83605 ASSAY OF LACTIC ACID: CPT | Mod: HCNC | Performed by: PHYSICIAN ASSISTANT

## 2023-04-18 PROCEDURE — 96361 HYDRATE IV INFUSION ADD-ON: CPT | Mod: HCNC

## 2023-04-18 PROCEDURE — 99214 PR OFFICE/OUTPT VISIT, EST, LEVL IV, 30-39 MIN: ICD-10-PCS | Mod: HCNC,GC,S$GLB, | Performed by: STUDENT IN AN ORGANIZED HEALTH CARE EDUCATION/TRAINING PROGRAM

## 2023-04-18 PROCEDURE — 84443 ASSAY THYROID STIM HORMONE: CPT | Mod: HCNC | Performed by: PHYSICIAN ASSISTANT

## 2023-04-18 PROCEDURE — 1101F PT FALLS ASSESS-DOCD LE1/YR: CPT | Mod: HCNC,CPTII,GC,S$GLB | Performed by: STUDENT IN AN ORGANIZED HEALTH CARE EDUCATION/TRAINING PROGRAM

## 2023-04-18 PROCEDURE — 99999 PR PBB SHADOW E&M-EST. PATIENT-LVL II: CPT | Mod: PBBFAC,HCNC,GC, | Performed by: STUDENT IN AN ORGANIZED HEALTH CARE EDUCATION/TRAINING PROGRAM

## 2023-04-18 PROCEDURE — 99285 PR EMERGENCY DEPT VISIT,LEVEL V: ICD-10-PCS | Mod: HCNC,,, | Performed by: PHYSICIAN ASSISTANT

## 2023-04-18 PROCEDURE — 3288F PR FALLS RISK ASSESSMENT DOCUMENTED: ICD-10-PCS | Mod: HCNC,CPTII,GC,S$GLB | Performed by: STUDENT IN AN ORGANIZED HEALTH CARE EDUCATION/TRAINING PROGRAM

## 2023-04-18 PROCEDURE — 1101F PR PT FALLS ASSESS DOC 0-1 FALLS W/OUT INJ PAST YR: ICD-10-PCS | Mod: HCNC,CPTII,GC,S$GLB | Performed by: STUDENT IN AN ORGANIZED HEALTH CARE EDUCATION/TRAINING PROGRAM

## 2023-04-18 PROCEDURE — 99214 OFFICE O/P EST MOD 30 MIN: CPT | Mod: HCNC,GC,S$GLB, | Performed by: STUDENT IN AN ORGANIZED HEALTH CARE EDUCATION/TRAINING PROGRAM

## 2023-04-18 PROCEDURE — 80053 COMPREHEN METABOLIC PANEL: CPT | Mod: HCNC | Performed by: PHYSICIAN ASSISTANT

## 2023-04-18 PROCEDURE — 84484 ASSAY OF TROPONIN QUANT: CPT | Mod: HCNC | Performed by: PHYSICIAN ASSISTANT

## 2023-04-18 PROCEDURE — 83880 ASSAY OF NATRIURETIC PEPTIDE: CPT | Mod: HCNC | Performed by: PHYSICIAN ASSISTANT

## 2023-04-18 PROCEDURE — 99285 EMERGENCY DEPT VISIT HI MDM: CPT | Mod: HCNC,,, | Performed by: PHYSICIAN ASSISTANT

## 2023-04-18 PROCEDURE — 86803 HEPATITIS C AB TEST: CPT | Mod: HCNC | Performed by: STUDENT IN AN ORGANIZED HEALTH CARE EDUCATION/TRAINING PROGRAM

## 2023-04-18 PROCEDURE — 3288F FALL RISK ASSESSMENT DOCD: CPT | Mod: HCNC,CPTII,GC,S$GLB | Performed by: STUDENT IN AN ORGANIZED HEALTH CARE EDUCATION/TRAINING PROGRAM

## 2023-04-18 PROCEDURE — 96375 TX/PRO/DX INJ NEW DRUG ADDON: CPT | Mod: HCNC

## 2023-04-18 PROCEDURE — 80047 BASIC METABLC PNL IONIZED CA: CPT | Mod: HCNC,59

## 2023-04-18 PROCEDURE — 3075F SYST BP GE 130 - 139MM HG: CPT | Mod: HCNC,CPTII,GC,S$GLB | Performed by: STUDENT IN AN ORGANIZED HEALTH CARE EDUCATION/TRAINING PROGRAM

## 2023-04-18 PROCEDURE — 93005 ELECTROCARDIOGRAM TRACING: CPT | Mod: HCNC

## 2023-04-18 PROCEDURE — 85025 COMPLETE CBC W/AUTO DIFF WBC: CPT | Mod: HCNC | Performed by: PHYSICIAN ASSISTANT

## 2023-04-18 PROCEDURE — 1125F AMNT PAIN NOTED PAIN PRSNT: CPT | Mod: HCNC,CPTII,GC,S$GLB | Performed by: STUDENT IN AN ORGANIZED HEALTH CARE EDUCATION/TRAINING PROGRAM

## 2023-04-18 RX ORDER — MORPHINE SULFATE 4 MG/ML
4 INJECTION, SOLUTION INTRAMUSCULAR; INTRAVENOUS
Status: COMPLETED | OUTPATIENT
Start: 2023-04-18 | End: 2023-04-18

## 2023-04-18 RX ORDER — ONDANSETRON 2 MG/ML
4 INJECTION INTRAMUSCULAR; INTRAVENOUS
Status: COMPLETED | OUTPATIENT
Start: 2023-04-18 | End: 2023-04-18

## 2023-04-18 RX ADMIN — ONDANSETRON 4 MG: 2 INJECTION INTRAMUSCULAR; INTRAVENOUS at 11:04

## 2023-04-18 RX ADMIN — SODIUM CHLORIDE, POTASSIUM CHLORIDE, SODIUM LACTATE AND CALCIUM CHLORIDE 1000 ML: 600; 310; 30; 20 INJECTION, SOLUTION INTRAVENOUS at 07:04

## 2023-04-18 RX ADMIN — MORPHINE SULFATE 4 MG: 4 INJECTION INTRAVENOUS at 11:04

## 2023-04-18 NOTE — ED PROVIDER NOTES
"Encounter Date: 4/18/2023       History     Chief Complaint   Patient presents with    Abdominal Pain     Constipated. Last BM 5 days ago. Reported to clinic earlier today, providers there concerned for dyspnea w/ exertion. Not requiring o2 at triage. On arrival pt only c/o of abdominal pain and constipation, reports she is "not worried about the SOB."      The history is provided by the patient and medical records. No  was used.     Henrietta Villasenor is a 83 y.o. female with medical history of CAD, HTN, HLD, DM, Nephrolithiasis, Cholecystectomy, Hysterectomy, Diverticulitis, MARLON on CPAP, Hx of DVT presenting to the ED with the chief complaint of abdominal pain and shortness of breath.     Arrives via EMS from IM clinic. Reports having lower abdominal and R flank pain over the last week. Feels constipated and last BM was 5 days ago. Concerned her stool was dark. She drove herself to her IM clinic appointment. Provider at IM clinic was concerned about her breathing which prompted her to be sent to the ED. Patient feels SOB with exerting herself. No oxygen use at home. No lower extremity swelling. No fever. No vomiting.     Review of patient's allergies indicates:   Allergen Reactions    Crestor [rosuvastatin]      Cramping in legs    Ezetimibe Diarrhea     Other reaction(s): abdominal pain, Diarrhea      Hydrocodone Itching    Lisinopril      Other reaction(s): cough      Sulfa (sulfonamide antibiotics) Itching and Rash           Sulfamethoxazole     Sulfamethoxazole-trimethoprim     Valsartan Swelling     Past Medical History:   Diagnosis Date    Abnormality of lung 11/08/2011    Stable bandlike opacities at the lung bases, most likely representing      Anxiety     Arthritis     CAD (coronary artery disease)     Calculus of ureter 2/22/2011    CHF (congestive heart failure)     Chronic back pain 7/29/2012    Colon polyp 9/2010; 11/2010    Colon polyps 7/29/2012    Coronary artery disease involving " native coronary artery of native heart with angina pectoris     Depression     Diabetes mellitus     Diabetes mellitus type II     Diverticulitis large intestine 7/27/2015    Diverticulitis of large intestine without perforation or abscess without bleeding     Diverticulosis 09/25/2010; 11/02/2010; 11/08/2011; 7/29/2012    Duodenal disorder 08/25/2011    Duodenal erosion noted on EGD.    Duodenal ulcer, unspecified as acute or chronic, without hemorrhage, perforation, or obstruction 8/24/2011    E. coli sepsis 12/2010    Due to left ureteral stone with left nephrostomy tube - hospitalized in [place]    Esophageal dysmotility 01/24/2012    Noted on upper GI-barium swallow.    Facial weakness 1969    Left facial weakness s/p left mastoidectomy in ~ 1969.    Fatty liver 11/08/2011    Reported on CT-abdomen and in 06/2012 Gastro clinic visit note.    Gastric polyp 09/29/2010    GERD (gastroesophageal reflux disease) 7/29/2012    Hepatomegaly 11/08/2011    Reported on CT-abdomen    Herpes zoster with other nervous system complications(053.19) 2/28/2011    Hiatal hernia 06/26/2006; 09/29/2010; 08/25/2011    Noted on barium swallow 2006; noted on  EGD 2011.    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    Trouble in sleeping     Type II or unspecified type diabetes mellitus with neurological manifestations, not stated as uncontrolled(250.60)     Type II or unspecified type diabetes mellitus with peripheral circulatory disorders, not stated as uncontrolled(250.70)     Type II or unspecified type diabetes mellitus with renal manifestations, not stated as uncontrolled(250.40)      Past Surgical History:   Procedure Laterality Date    BELPHAROPTOSIS REPAIR      s/p LAMBERTO levator repair - Dr. Dejesus    CARDIAC CATHETERIZATION      CARPAL TUNNEL RELEASE  3/13/2012    CATARACT EXTRACTION W/  INTRAOCULAR LENS IMPLANT Right 10/31/2018        CATARACT EXTRACTION W/  INTRAOCULAR LENS IMPLANT Left 03/22/2018        CHOLECYSTECTOMY      COLONOSCOPY N/A 11/16/2016    Procedure: COLONOSCOPY;  Surgeon: Chris Storey MD;  Location: [facility] ENDO (4TH FLR);  Service: Endoscopy;  Laterality: N/A;    COLONOSCOPY N/A 6/14/2017    Procedure: COLONOSCOPY;  Surgeon: Chris Storey MD;  Location: [facility] (4TH FLR);  Service: Endoscopy;  Laterality: N/A;  colonoscopy in 3 months with better bowel prep. - Split PEG prep ordered    ESOPHAGOGASTRODUODENOSCOPY N/A 11/10/2021    Procedure: EGD (ESOPHAGOGASTRODUODENOSCOPY);  Surgeon: Kuldeep Velázquez MD;  Location: [facility] ENDO (4TH FLR);  Service: Endoscopy;  Laterality: N/A;  11/4-eliquis hold ok see te -tb-fully vacc-inst mail and verbal-    EXTRACORPOREAL SHOCK WAVE LITHOTRIPSY      INJECTION OF ANESTHETIC AGENT AROUND NERVE Bilateral 6/24/2020    Procedure: BLOCK, NERVE, C4-C5-C6 MEDIAL BRANCH ok per [place] to add on;  Surgeon: Oscar Ravi MD;  Location: [facility] PAIN MGT;  Service: Pain Management;  Laterality: Bilateral;    INTRAOCULAR PROSTHESES INSERTION Right 10/31/2018    Procedure: INSERTION-INTRAOCULAR LENS (IOL);  Surgeon: Keysha Valle MD;  Location: [facility] OR;  Service: Ophthalmology;  Laterality: Right;    JOINT REPLACEMENT      mastoid tumor removal   1969    Left mastoidectomy with residual left facial weakness.    NEPHROSTOMY      OOPHORECTOMY      PHACOEMULSIFICATION OF CATARACT Right 10/31/2018    Procedure: PHACOEMULSIFICATION-ASPIRATION-CATARACT;  Surgeon: Keysha Valle MD;  Location: [facility] OR;  Service: Ophthalmology;  Laterality: Right;    TOTAL ABDOMINAL HYSTERECTOMY  1969    TAHBSO    TOTAL KNEE ARTHROPLASTY  9/13/2012    Left    TRANSFORAMINAL EPIDURAL INJECTION OF STEROID Bilateral 9/17/2019    Procedure: INJECTION, STEROID, EPIDURAL, TRANSFORAMINAL APPROACH;  Surgeon: Oscar Ravi MD;  Location: [facility] PAIN MGT;  Service: Pain Management;  Laterality: Bilateral;  B/L TFESI L4/L5    TRANSFORAMINAL EPIDURAL INJECTION OF STEROID Bilateral 10/2/2020    Procedure: INJECTION, STEROID, EPIDURAL, TRANSFORAMINAL APPROACH;  Surgeon: Oscar Ravi MD;  Location: [facility] PAIN MGT;  Service: Pain Management;  Laterality: Bilateral;  B/L TFESI L4/5    TRANSFORAMINAL EPIDURAL INJECTION OF STEROID Bilateral 5/7/2021    Procedure: INJECTION, STEROID, EPIDURAL, TRANSFORAMINAL APPROACH, L4-L5 clear to hold Eliquis 3 days;  Surgeon: Oscar Ravi MD;  Location: [facility] PAIN MGT;  Service: Pain Management;  Laterality: Bilateral;     Family History   Problem Relation Age of Onset    Sleep apnea Sister     Diabetes Sister     Cancer Mother         brain tumor    Hypertension Mother     Heart disease Father     Heart attack Father     Dementia Brother     Lupus Brother     Frontotemporal dementia Brother     No Known Problems Daughter     No Known Problems Son     Diabetes Sister     Lupus Sister     Breast cancer Sister     Diabetes Sister     Cancer Sister         breast cancer    Heart attack Sister     Diabetes Sister     Liver cancer Brother     Drug abuse Brother     Alcohol abuse Brother     Cirrhosis Brother     Drug abuse Brother     No Known Problems Daughter     No Known Problems Son     No Known Problems Son     No Known Problems Maternal Grandmother      No Known Problems Maternal Grandfather     No Known Problems Paternal Grandmother     No Known Problems Paternal Grandfather     Diabetes Brother     Dementia Brother     Diabetes Other     Ovarian cancer Other         Niece     Breast cancer Maternal Aunt     No Known Problems Maternal Uncle     No Known Problems Paternal Aunt     No Known Problems Paternal Uncle     Glaucoma Neg Hx     Blindness Neg Hx     Celiac disease Neg Hx     Colon cancer Neg Hx     Colon polyps Neg Hx     Esophageal cancer Neg Hx     Inflammatory bowel disease Neg Hx     Liver disease Neg Hx     Rectal cancer Neg Hx     Stomach cancer Neg Hx     Ulcerative colitis Neg Hx     Melanoma Neg Hx     Multiple sclerosis Neg Hx     Psoriasis Neg Hx     Amblyopia Neg Hx     Cataracts Neg Hx     Macular degeneration Neg Hx     Retinal detachment Neg Hx     Strabismus Neg Hx     Stroke Neg Hx     Thyroid disease Neg Hx      Social History     Tobacco Use    Smoking status: Former     Packs/day: 1.00     Years: 15.00     Pack years: 15.00     Types: Cigarettes     Quit date: 2000     Years since quittin.7    Smokeless tobacco: Never   Substance Use Topics    Alcohol use: No    Drug use: No     Review of Systems   Respiratory:  Positive for shortness of breath.      Physical Exam     Initial Vitals [23 1612]   BP Pulse Resp Temp SpO2   (!) 146/82 87 20 98.7 °F (37.1 °C) 99 %      MAP       --         Physical Exam    Constitutional: She appears well-developed and well-nourished. She is not diaphoretic. No distress.   HENT:   Head: Normocephalic and atraumatic.   Mouth/Throat: Oropharynx is clear and moist. No oropharyngeal exudate.   Eyes: Conjunctivae and EOM are normal. Pupils are equal, round, and reactive to light. No scleral icterus.   Neck: Neck supple.   Normal range of motion.  Cardiovascular:  Normal rate and regular rhythm.           No lower extremity swelling   Pulmonary/Chest: Breath sounds normal. Tachypnea noted. No  respiratory distress. She has no wheezes. She has no rales.   Abdominal: Abdomen is soft. She exhibits no distension. There is no abdominal tenderness. There is no rebound.   Genitourinary:    Genitourinary Comments: Rectal - Light brown stool on gloved finger. No melena or hematochezia. No impaction. FOBT negative.     Musculoskeletal:         General: No tenderness or edema. Normal range of motion.      Cervical back: Normal range of motion and neck supple.     Neurological: She is alert and oriented to person, place, and time. She has normal strength. No sensory deficit.   Skin: Skin is warm and dry. No rash noted. No erythema.   Psychiatric: She has a normal mood and affect.       ED Course   Procedures  Labs Reviewed   CBC W/ AUTO DIFFERENTIAL - Abnormal; Notable for the following components:       Result Value    Immature Granulocytes 0.6 (*)     Gran # (ANC) 7.9 (*)     Immature Grans (Abs) 0.07 (*)     Mono # 1.4 (*)     All other components within normal limits   COMPREHENSIVE METABOLIC PANEL - Abnormal; Notable for the following components:    Sodium 135 (*)     Chloride 94 (*)     Glucose 168 (*)     BUN 39 (*)     Creatinine 2.1 (*)     Albumin 3.3 (*)     eGFR 22.9 (*)     All other components within normal limits   MAGNESIUM - Abnormal; Notable for the following components:    Magnesium 1.4 (*)     All other components within normal limits   HEPATITIS C ANTIBODY - Abnormal; Notable for the following components:    Hepatitis C Ab Equivocal (*)     All other components within normal limits    Narrative:     Release to patient->Immediate   ISTAT PROCEDURE - Abnormal; Notable for the following components:    POC Glucose 171 (*)     POC BUN 38 (*)     POC Creatinine 2.3 (*)     POC Sodium 135 (*)     POC Potassium 3.4 (*)     POC Chloride 94 (*)     All other components within normal limits   B-TYPE NATRIURETIC PEPTIDE   TROPONIN I   TSH   LIPASE   LACTIC ACID, PLASMA   HIV 1 / 2 ANTIBODY    Narrative:      Release to patient->Immediate   URINALYSIS, REFLEX TO URINE CULTURE   HEPATITIS C RNA, QUANTITATIVE, PCR   ISTAT CHEM8          Imaging Results              CT Abdomen Pelvis  Without Contrast (In process)                      X-Ray Chest AP Portable (Final result)  Result time 04/18/23 20:17:48      Final result by Timbo Baumann MD (04/18/23 20:17:48)                   Impression:      No radiographic evidence of pneumonia or other source of shortness of breath on this single view, noting that early/mild viral pneumonia or nonspecific bronchitis may be radiographically occult.      Electronically signed by: Timbo Baumann MD  Date:    04/18/2023  Time:    20:17               Narrative:    EXAMINATION:  XR CHEST AP PORTABLE    CLINICAL HISTORY:  Shortness of breath    TECHNIQUE:  Single frontal view of the chest was performed.    COMPARISON:  Chest radiograph 01/18/2023 and CT thorax 12/15/2020    FINDINGS:  Monitoring leads overlie the chest.  Patient is slightly rotated.  Large body habitus.    Cardiomediastinal silhouette is midline and stable without evidence of failure.  Pulmonary vasculature and hilar contours are within normal limits.  Bibasilar minimal platelike scarring versus atelectasis.  The lungs are otherwise well expanded without consolidation, pleural effusion or pneumothorax.  No acute osseous process seen.  PA and lateral views can be obtained.                                       Medications   lactated ringers bolus 1,000 mL (1,000 mLs Intravenous New Bag 4/18/23 1936)     Medical Decision Making:   History:   Old Medical Records: I decided to obtain old medical records.  Old Records Summarized: records from clinic visits and records from previous admission(s).  Clinical Tests:   Lab Tests: Ordered and Reviewed  Radiological Study: Ordered and Reviewed  Medical Tests: Ordered and Reviewed     APC / Resident Notes:   83 y.o. female with medical history of CAD, HTN, HLD, DM, Nephrolithiasis,  Cholecystectomy, Hysterectomy, Diverticulitis, MARLON on CPAP, Hx of DVT presenting to the ED c/o lower abdominal pain and constipation. Seen at [facility] clinic and noted to SOB which prompted her ED visit. DDx includes but not limited to constipation, fecal impaction, bowel obstruction, malignancy, CHF, ACS, cardiac arrhythmia, pneumonia, electrolyte disturbance, dehydration, HANNAH.     10:00 PM - Timbo Acosta PA-C  Work-up reviewed. Crt elevated 2.1, baseline 1.1 consistent with HANNAH. IVF administered. Cardiac markers negative. CXR without large consolidation, pleural effusion, or pneumothorax on my read. Patient signed out to my colleague at the end of my shift pending CT abd/pelv. Anticipate outpatient management if repeat Crt improved after IVF and no significant findings on CT ab/pelv. I have discussed the care of this patient with my supervising physician.                    Clinical Impression:   Final diagnoses:  [R06.02] SOB (shortness of breath)  [N17.9] HANNAH (acute kidney injury) (Primary)  [R10.9] Abdominal pain, unspecified abdominal location               Timbo Acosta PA-C  04/18/23 [id number]

## 2023-04-18 NOTE — ED NOTES
I-STAT Chem-8+ Results:   Value Reference Range   Sodium 135 136-145 mmol/L   Potassium  3.4 3.5-5.1 mmol/L   Chloride 94  mmol/L   Ionized Calcium 1.11 1.06-1.42 mmol/L   CO2 (measured) 28 23-29 mmol/L   Glucose 171  mg/dL   BUN 38 6-30 mg/dL   Creatinine 2.3 0.5-1.4 mg/dL   Hematocrit 41 36-54%

## 2023-04-18 NOTE — PROGRESS NOTES
Henrietta Villasenor  1940        Subjective     Chief Complaint: Stomach cramping and weakness    History of Present Illness:  Ms. Henrietta Villasenor is a 83 y.o. female who presents to clinic for Stomach cramping and weakness    The patient is breathing very heavily during the interview. She states she is out of breath and becoming worseningly dyspnic on exertion     No fevers chills nauea vomiting no chest pin     She has not had a bowel movement since last Friday     She took some miralax but it was not effective    PMH  1. Diabetes mellitus   2. Hypertension   3. Hyperlipidemia   4. Left heart catheterization January 2007 which revealed luminal irregularities in the LAD, left circumflex and right coronary artery. She had diastolic dysfunction and patent renal arteries.   5. Sleep apnea   6. Obesity.   7. Nephrolithiasis status post lithotripsy.   8. Reflux - had nonerosive gastropathy on EGD September 2007. Gastritis on EGD 9/2010  9. Hidradenitis suppurativa.   10. History of diverticulitis with a hospitalized October 2007.   11. Migraines   12. Obesity.   13. 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    14. Total hysterectomy in 1969.   15. Cholecystectomy was done at that time as well.   16. 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   17. Colon polyps on colonscopy 11/2010 - due 2013   18. Hospitalization 12/10 for e coli sepsis due to left ureteral stone with left nephrostomy tube in [place], [place]   19. Left knee replacement 9/2012      Per previous PCP visit one month prior  She has her pill bottles with her today. She does not have allopurinol.     She has had itching on both arms and some of her legs.  No rash. Itching comes and goes. She takes benadryl at night which helps her sleep.      She continues to have neck and shoulder pain, back pain. She complains of back pain and body pain  which has been severe. She is taking oxycodone  apap 10/325 up to 5 times daily.   She continues to take duloxetine 60 mg twice daily.  Her mood is doing ok.        She saw Dr Reilly on 1/18/22 - she continues to take Eliquis 5 mg wice daily.  She had cardiac PET stress 2/9/2022 which was negative for ischemia          No fever or chills. She has felt cold.  No nausea, vomiting, constipation, dysuria, hematuira.  She denies swallowing problems today.        She is living alone. She does not see her grandson very  much. She has a niece, Kacie Mast who lives locally.      She is taking  amlodipine 10 mg 1 daily, coreg 25 mg twice daily and lasix 40 mg 1/2  tablet once daily for her hypertension and diastolic dysfunction.     Ela is taking requipo 0.5  mg at bedtime - for helping the restless leg     She saw Endocrinology on 10/22/20. She should take Lantus 60 units daily and Novolog 24 units once or twice daily with meals.  Blood sugar has been up and down. 125-200.  She has changed her diet and has lost 8 pounds    The patient had a Cardiac PET in 2022    The relative PET images show no clinically significant regional resting or stress induced perfusion abnormalities.    The whole heart absolute myocardial perfusion values averaged 1.33 cc/min/g at rest, which is elevated; 1.79 cc/min/g at stress, which is mildly reduced; and CFR is 1.35 , which is moderately reduced in part due to elevated resting flow.    CT attenuation images demonstrate moderate diffuse coronary calcifications in the LAD and LCX territory.    The gated perfusion images showed an ejection fraction of 42% at rest and 56% during stress. A normal ejection fraction is greater than 53%.    The wall motion is normal at rest and during stress.    The LV cavity size is normal at rest and stress.    The EKG portion of this study is negative for ischemia.    During stress, occasional PVCs are noted.    The patient reported no chest pain during the stress test.    No burning with urination or increased  frequency    Lives by herself - she drove here     She states that she has stopped taking some of her pills. She does not know which ones she is taking. She states that     Review of Systems   Constitutional:  Positive for weight loss.   HENT:  Negative for congestion.    Respiratory:  Positive for shortness of breath and wheezing.    Cardiovascular:  Positive for leg swelling. Negative for chest pain.   Gastrointestinal:  Positive for abdominal pain, constipation and nausea.   Genitourinary:  Positive for flank pain. Negative for dysuria and urgency.   Musculoskeletal:  Positive for back pain, joint pain and myalgias.   Neurological:  Positive for weakness.   Psychiatric/Behavioral:  Negative for depression and substance abuse.       PAST HISTORY:     Past Medical History:   Diagnosis Date    Abnormality of lung 11/08/2011    Stable bandlike opacities at the lung bases, most likely representing      Anxiety     Arthritis     CAD (coronary artery disease)     Calculus of ureter 2/22/2011    CHF (congestive heart failure)     Chronic back pain 7/29/2012    Colon polyp 9/2010; 11/2010    Colon polyps 7/29/2012    Coronary artery disease involving native coronary artery of native heart with angina pectoris     Depression     Diabetes mellitus     Diabetes mellitus type II     Diverticulitis large intestine 7/27/2015    Diverticulitis of large intestine without perforation or abscess without bleeding     Diverticulosis 09/25/2010; 11/02/2010; 11/08/2011; 7/29/2012    Duodenal disorder 08/25/2011    Duodenal erosion noted on EGD.    Duodenal ulcer, unspecified as acute or chronic, without hemorrhage, perforation, or obstruction 8/24/2011    E. coli sepsis 12/2010    Due to left ureteral stone with left nephrostomy tube - hospitalized in [place]    Esophageal dysmotility 01/24/2012    Noted on upper GI-barium swallow.    Facial weakness 1969    Left facial weakness s/p left mastoidectomy in ~ 1969.    Fatty liver  11/08/2011    Reported on CT-abdomen and in 06/2012 Gastro clinic visit note.    Gastric polyp 09/29/2010    GERD (gastroesophageal reflux disease) 7/29/2012    Hepatomegaly 11/08/2011    Reported on CT-abdomen    Herpes zoster with other nervous system complications(053.19) 2/28/2011    Hiatal hernia 06/26/2006; 09/29/2010; 08/25/2011    Noted on barium swallow 2006; noted on  EGD 2011.    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    Trouble in sleeping     Type II or unspecified type diabetes mellitus with neurological manifestations, not stated as uncontrolled(250.60)     Type II or unspecified type diabetes mellitus with peripheral circulatory disorders, not stated as uncontrolled(250.70)     Type II or unspecified type diabetes mellitus with renal manifestations, not stated as uncontrolled(250.40)        Past Surgical History:   Procedure Laterality Date    BELPHAROPTOSIS REPAIR      s/p LAMBERTO levator repair - Dr. Dejesus    CARDIAC CATHETERIZATION      CARPAL TUNNEL RELEASE  3/13/2012    CATARACT EXTRACTION W/  INTRAOCULAR LENS IMPLANT Right 10/31/2018        CATARACT EXTRACTION W/  INTRAOCULAR LENS IMPLANT Left 03/22/2018        CHOLECYSTECTOMY      COLONOSCOPY N/A  11/16/2016    Procedure: COLONOSCOPY;  Surgeon: Chris Storey MD;  Location: [facility] (4TH FLR);  Service: Endoscopy;  Laterality: N/A;    COLONOSCOPY N/A 6/14/2017    Procedure: COLONOSCOPY;  Surgeon: Chris Storey MD;  Location: [facility] ENDO (4TH FLR);  Service: Endoscopy;  Laterality: N/A;  colonoscopy in 3 months with better bowel prep. - Split PEG prep ordered    ESOPHAGOGASTRODUODENOSCOPY N/A 11/10/2021    Procedure: EGD (ESOPHAGOGASTRODUODENOSCOPY);  Surgeon: Kuldeep Velázquez MD;  Location: [facility] ENDO (4TH FLR);  Service: Endoscopy;  Laterality: N/A;  11/4-eliquis hold ok see te -tb-fully vacc-inst mail and verbal-    EXTRACORPOREAL SHOCK WAVE LITHOTRIPSY      INJECTION OF ANESTHETIC AGENT AROUND NERVE Bilateral 6/24/2020    Procedure: BLOCK, NERVE, C4-C5-C6 MEDIAL BRANCH ok per Alexander to add on;  Surgeon: Oscar Ravi MD;  Location: [facility] PAIN MGT;  Service: Pain Management;  Laterality: Bilateral;    INTRAOCULAR PROSTHESES INSERTION Right 10/31/2018    Procedure: INSERTION-INTRAOCULAR LENS (IOL);  Surgeon: Keysha Valle MD;  Location: [facility] OR;  Service: Ophthalmology;  Laterality: Right;    JOINT REPLACEMENT      mastoid tumor removal  1969    Left mastoidectomy with residual left facial weakness.    NEPHROSTOMY      OOPHORECTOMY      PHACOEMULSIFICATION OF CATARACT Right 10/31/2018    Procedure: PHACOEMULSIFICATION-ASPIRATION-CATARACT;  Surgeon: Keysha Valle MD;  Location: [facility] OR;  Service: Ophthalmology;  Laterality: Right;    TOTAL ABDOMINAL HYSTERECTOMY  1969    TAHBSO    TOTAL KNEE ARTHROPLASTY  9/13/2012    Left    TRANSFORAMINAL EPIDURAL INJECTION OF STEROID Bilateral 9/17/2019    Procedure: INJECTION, STEROID, EPIDURAL, TRANSFORAMINAL APPROACH;  Surgeon: Oscar Ravi MD;  Location: [facility] PAIN MGT;  Service: Pain Management;  Laterality: Bilateral;  B/L TFESI L4/L5    TRANSFORAMINAL EPIDURAL INJECTION OF STEROID Bilateral 10/2/2020    Procedure: INJECTION, STEROID, EPIDURAL,  TRANSFORAMINAL APPROACH;  Surgeon: Oscar Ravi MD;  Location: [facility] [place], operated by [facility] PAIN MGT;  Service: Pain Management;  Laterality: Bilateral;  B/L TFESI L4/5    TRANSFORAMINAL EPIDURAL INJECTION OF STEROID Bilateral 5/7/2021    Procedure: INJECTION, STEROID, EPIDURAL, TRANSFORAMINAL APPROACH, L4-L5 clear to hold Eliquis 3 days;  Surgeon: Oscar Ravi MD;  Location: [facility], [place], operated by [facility] PAIN MGT;  Service: Pain Management;  Laterality: Bilateral;       Family History   Problem Relation Age of Onset    Sleep apnea Sister     Diabetes Sister     Cancer Mother         brain tumor    Hypertension Mother     Heart disease Father     Heart attack Father     Dementia Brother     Lupus Brother     Frontotemporal dementia Brother     No Known Problems Daughter     No Known Problems Son     Diabetes Sister     Lupus Sister     Breast cancer Sister     Diabetes Sister     Cancer Sister         breast cancer    Heart attack Sister     Diabetes Sister     Liver cancer Brother     Drug abuse Brother     Alcohol abuse Brother     Cirrhosis Brother     Drug abuse Brother     No Known Problems Daughter     No Known Problems Son     No Known Problems Son     No Known Problems Maternal Grandmother     No Known Problems Maternal Grandfather     No Known Problems Paternal Grandmother     No Known Problems Paternal Grandfather     Diabetes Brother     Dementia Brother     Diabetes Other     Ovarian cancer Other         Niece     Breast cancer Maternal Aunt     No Known Problems Maternal Uncle     No Known Problems Paternal Aunt     No Known Problems Paternal Uncle     Glaucoma Neg Hx     Blindness Neg Hx     Celiac disease Neg Hx     Colon cancer Neg Hx     Colon polyps Neg Hx     Esophageal cancer Neg Hx     Inflammatory bowel disease Neg Hx     Liver disease Neg Hx     Rectal cancer Neg Hx     Stomach cancer Neg Hx     Ulcerative colitis Neg Hx     Melanoma Neg Hx     Multiple sclerosis Neg Hx     Psoriasis Neg Hx     Amblyopia Neg Hx     Cataracts Neg Hx      Macular degeneration Neg Hx     Retinal detachment Neg Hx     Strabismus Neg Hx     Stroke Neg Hx     Thyroid disease Neg Hx        Social History     Socioeconomic History    Marital status: Single    Number of children: 5   Tobacco Use    Smoking status: Former     Packs/day: 1.00     Years: 15.00     Pack years: 15.00     Types: Cigarettes     Quit date: 2000     Years since quittin.7    Smokeless tobacco: Never   Substance and Sexual Activity    Alcohol use: No    Drug use: No    Sexual activity: Never   Social History Narrative    Single with 5 children. Lives alone. Retired cook.     Social Determinants of Health     Financial Resource Strain: Low Risk     Difficulty of Paying Living Expenses: Not hard at all   Food Insecurity: Food Insecurity Present    Worried About Running Out of Food in the Last Year: Sometimes true    Ran Out of Food in the Last Year: Never true   Transportation Needs: No Transportation Needs    Lack of Transportation (Medical): No    Lack of Transportation (Non-Medical): No   Physical Activity: Inactive    Days of Exercise per Week: 0 days    Minutes of Exercise per Session: 0 min   Housing Stability: High Risk    Unable to Pay for Housing in the Last Year: Yes    Number of Places Lived in the Last Year: 1    Unstable Housing in the Last Year: No       MEDICATIONS & ALLERGIES:     Current Outpatient Medications on File Prior to Visit   Medication Sig    ACCU-CHEK GUIDE TEST STRIPS Strp TEST BLOOD SUGAR THREE TIMES DAILY    ACCU-CHEK SOFT DEV LANCETS Kit     albuterol (PROVENTIL/VENTOLIN HFA) 90 mcg/actuation inhaler Inhale 2 puffs by mouth into the lungs every 4 (four) hours as needed for Wheezing. Rescue    amLODIPine (NORVASC) 10 MG tablet Take 1 tablet (10 mg total) by mouth once daily.    apixaban (ELIQUIS) 5 mg Tab Take 1 tablet (5 mg total) by mouth 2 (two) times daily.    atorvastatin (LIPITOR) 40 MG tablet Take 1 tablet (40 mg total) by mouth once daily.    BD  "ULTRA-FINE OLU PEN NEEDLE 32 gauge x 5/32" Ndle Uses 4 times daily, on multiple daily insulin injections    blood-glucose meter kit Use as instructed    carvediloL (COREG) 25 MG tablet Take 1 tablet (25 mg total) by mouth 2 (two) times daily.    cholecalciferol, vitamin D3, 125 mcg (5,000 unit) capsule Take 5,000 Units by mouth once daily.    DROPLET PEN NEEDLE 29 gauge x 1/2" Ndle USE FOUR TIMES DAILY    DROPSAFE ALCOHOL PREP PADS PadM USE TOPICALLY TO TEST BLOOD SUGAR FOUR TIMES DAILY    DULoxetine (CYMBALTA) 60 MG capsule Take 1 capsule (60 mg total) by mouth 2 (two) times daily.    fluticasone propionate (FLONASE) 50 mcg/actuation nasal spray Instill 2 sprays in each nostril once daily.    furosemide (LASIX) 40 MG tablet Take  1/2-1  tablet by mouth once daily    insulin (LANTUS SOLOSTAR U-100 INSULIN) glargine 100 units/mL (3mL) SubQ pen Inject 60 Units into the skin once daily.    insulin aspart U-100 (NOVOLOG) 100 unit/mL (3 mL) InPn pen Inject 24 Units into the skin before breakfast AND 28 Units daily with lunch AND 28 Units before dinner.    lancets (ACCU-CHEK SOFTCLIX LANCETS) Misc TEST BLOOD SUGAR FOUR TIMES DAILY    loratadine (CLARITIN) 10 mg tablet Take 1 tablet (10 mg total) by mouth once daily.    olopatadine (PATADAY) 0.2 % Drop Place 1 drop into both eyes once daily.    [START ON 5/5/2023] oxyCODONE-acetaminophen (PERCOCET)  mg per tablet Take 1 tablet by mouth 5 (five) times daily.    oxyCODONE-acetaminophen (PERCOCET)  mg per tablet Take 1 tablet by mouth 5 (five) times daily.    oxyCODONE-acetaminophen (PERCOCET)  mg per tablet Take 1 tablet by mouth 5 (five) times daily.    pantoprazole (PROTONIX) 40 MG tablet Take 1 tablet (40 mg total) by mouth once daily.    potassium chloride (KLOR-CON) 10 MEQ TbSR Take 1 tablet (10 mEq total) by mouth once daily.    rOPINIRole (REQUIP) 0.5 MG tablet Take 1 tablet (0.5 mg total) by mouth every evening.    triamcinolone acetonide 0.1% " "(KENALOG) 0.1 % cream Apply topically 2 (two) times daily.     Current Facility-Administered Medications on File Prior to Visit   Medication    0.9%  NaCl infusion    tetracaine HCl (PF) 0.5 % Drop 1 drop       Review of patient's allergies indicates:   Allergen Reactions    Crestor [rosuvastatin]      Cramping in legs    Ezetimibe Diarrhea     Other reaction(s): abdominal pain, Diarrhea      Hydrocodone Itching    Lisinopril      Other reaction(s): cough      Sulfa (sulfonamide antibiotics) Itching and Rash           Sulfamethoxazole     Sulfamethoxazole-trimethoprim     Valsartan Swelling       OBJECTIVE:     Vital Signs:  Vitals:    04/18/23 1443   BP: 138/78   BP Location: Right arm   Patient Position: Sitting   BP Method: Large (Manual)   Pulse: (!) 58   SpO2: 95%   Weight: 90.5 kg (199 lb 8.3 oz)   Height: 5' 8" (1.727 m)       Body mass index is 30.34 kg/m².     Physical Exam:  Physical Exam  Vitals reviewed.   Constitutional:       Appearance: She is ill-appearing.   HENT:      Nose: Nose normal.      Mouth/Throat:      Pharynx: Oropharynx is clear.   Eyes:      Conjunctiva/sclera: Conjunctivae normal.   Cardiovascular:      Rate and Rhythm: Normal rate and regular rhythm.      Pulses: Normal pulses.   Pulmonary:      Effort: Respiratory distress present.      Breath sounds: Wheezing present.   Abdominal:      General: Abdomen is flat. There is distension.      Palpations: Abdomen is soft.      Tenderness: There is abdominal tenderness. There is no guarding.   Musculoskeletal:         General: Swelling, tenderness and signs of injury present.      Right lower leg: Edema present.      Left lower leg: Edema present.   Skin:     General: Skin is warm.      Coloration: Skin is pale.   Neurological:      Mental Status: She is alert. Mental status is at baseline.      Motor: Weakness present.      Gait: Gait abnormal.   Psychiatric:         Mood and Affect: Mood normal.         Behavior: Behavior normal.         " Thought Content: Thought content normal.          Laboratory  Lab Results   Component Value Date    WBC 9.61 03/08/2023    HGB 11.1 (L) 03/08/2023    HCT 36.0 (L) 03/08/2023    MCV 89 03/08/2023     03/08/2023     Lab Results   Component Value Date     (H) 03/08/2023     03/08/2023    K 3.2 (L) 03/08/2023     03/08/2023    CO2 30 (H) 03/08/2023    BUN 8 03/08/2023    CREATININE 1.1 03/08/2023    CALCIUM 9.0 03/08/2023    MG 1.6 08/16/2022     Lab Results   Component Value Date    INR 1.1 08/16/2022    INR 1.1 12/02/2016    INR 1.1 02/16/2016     Lab Results   Component Value Date    HGBA1C 8.2 (H) 03/08/2023     No results for input(s): POCTGLUCOSE in the last 72 hours.      Health Maintenance         Date Due Completion Date    Shingles Vaccine (1 of 2) Never done ---    Urine Drug Screen 12/05/2018 6/5/2018 (Declined)    Override on 6/5/2018: Declined    Naloxone Prescription 06/05/2019 6/5/2018 (Declined)    Override on 6/5/2018: Declined    Diabetes Urine Screening 05/19/2022 5/19/2021    Lipid Panel 05/19/2022 5/19/2021    Eye Exam 05/27/2022 5/27/2021    Override on 12/5/2018: Done    Colonoscopy 06/14/2022 6/14/2017    Override on 11/2/2010: Done (done per Chris Storey due 11/02/2013)    Hemoglobin A1c 06/08/2023 3/8/2023    DEXA Scan 07/09/2023 7/9/2020    Override on 11/2/2011: Done (no repeat bmd required)    TETANUS VACCINE 03/20/2024 3/20/2014            ASSESSMENT & PLAN:   Ms. Henrietta Villasenor is a 83 y.o. female who was seen today in clinic for weakness and stomach pain    Her stomach pain seems to be driven by constipation as she has not had a BM in 5 days    She has chronic back pain and has been unable to take her opioids due to the stomach pain    Most concerning is that she is having Significant shortness of breath and severe dyspnea on exertion. She stated to me initially that this is getting worse. She lives alone and clinically I am concerned for her wellbeing as she  is alone and could be at the beginning of Heart Failure decompensation vs Airway process such as COPD exacerbation    She is also having significant issues with polypharmacy as she does not know what medications she is taking and she states she takes too many     Given this We will send her to the [facility] ED- I personally spoke to the staff in the ED about my clinical impression and a plan for Ms. Villasenor; care. She will be brought to the ED via ambulance    Henrietta was seen today for abdominal pain and back pain.    Diagnoses and all orders for this visit:    Shortness of breath  -     Refer to Emergency Dept.         1. Shortness of breath        RTC as needed    Monico Cohen MD  Internal Medicine PGY-2  [facility]  [address] [place] [address]

## 2023-04-18 NOTE — ED TRIAGE NOTES
Pt states PCP sent her to the ED for abd pain that started 5 days ago and loss of appetite times 6 months

## 2023-04-19 ENCOUNTER — ANESTHESIA EVENT (OUTPATIENT)
Dept: SURGERY | Facility: HOSPITAL | Age: 83
End: 2023-04-19
Payer: MEDICARE

## 2023-04-19 ENCOUNTER — ANESTHESIA (OUTPATIENT)
Dept: SURGERY | Facility: HOSPITAL | Age: 83
End: 2023-04-19
Payer: MEDICARE

## 2023-04-19 PROBLEM — N17.9 AKI (ACUTE KIDNEY INJURY): Status: ACTIVE | Noted: 2023-04-19

## 2023-04-19 PROBLEM — N13.2 HYDRONEPHROSIS WITH URINARY OBSTRUCTION DUE TO URETERAL CALCULUS: Status: ACTIVE | Noted: 2023-04-19

## 2023-04-19 LAB
ALBUMIN SERPL BCP-MCNC: 2.9 G/DL (ref 3.5–5.2)
ALP SERPL-CCNC: 138 U/L (ref 55–135)
ALT SERPL W/O P-5'-P-CCNC: 31 U/L (ref 10–44)
ANION GAP SERPL CALC-SCNC: 15 MMOL/L (ref 8–16)
AST SERPL-CCNC: 79 U/L (ref 10–40)
BASOPHILS # BLD AUTO: 0.06 K/UL (ref 0–0.2)
BASOPHILS NFR BLD: 0.5 % (ref 0–1.9)
BILIRUB SERPL-MCNC: 0.7 MG/DL (ref 0.1–1)
BILIRUB UR QL STRIP: NEGATIVE
BUN SERPL-MCNC: 30 MG/DL (ref 6–30)
BUN SERPL-MCNC: 33 MG/DL (ref 8–23)
CALCIUM SERPL-MCNC: 9.5 MG/DL (ref 8.7–10.5)
CHLORIDE SERPL-SCNC: 97 MMOL/L (ref 95–110)
CHLORIDE SERPL-SCNC: 99 MMOL/L (ref 95–110)
CLARITY UR REFRACT.AUTO: CLEAR
CO2 SERPL-SCNC: 21 MMOL/L (ref 23–29)
COLOR UR AUTO: YELLOW
CREAT SERPL-MCNC: 1.9 MG/DL (ref 0.5–1.4)
CREAT SERPL-MCNC: 2.1 MG/DL (ref 0.5–1.4)
DIFFERENTIAL METHOD: ABNORMAL
EOSINOPHIL # BLD AUTO: 0.2 K/UL (ref 0–0.5)
EOSINOPHIL NFR BLD: 1.4 % (ref 0–8)
ERYTHROCYTE [DISTWIDTH] IN BLOOD BY AUTOMATED COUNT: 14 % (ref 11.5–14.5)
EST. GFR  (NO RACE VARIABLE): 22.9 ML/MIN/1.73 M^2
GLUCOSE SERPL-MCNC: 145 MG/DL (ref 70–110)
GLUCOSE SERPL-MCNC: 162 MG/DL (ref 70–110)
GLUCOSE UR QL STRIP: NEGATIVE
HCT VFR BLD AUTO: 35.9 % (ref 37–48.5)
HCT VFR BLD CALC: 33 %PCV (ref 36–54)
HCV RNA SERPL QL NAA+PROBE: NOT DETECTED
HCV RNA SPEC NAA+PROBE-ACNC: <12 IU/ML
HGB BLD-MCNC: 11.7 G/DL (ref 12–16)
HGB UR QL STRIP: ABNORMAL
IMM GRANULOCYTES # BLD AUTO: 0.07 K/UL (ref 0–0.04)
IMM GRANULOCYTES NFR BLD AUTO: 0.6 % (ref 0–0.5)
KETONES UR QL STRIP: NEGATIVE
LEUKOCYTE ESTERASE UR QL STRIP: NEGATIVE
LYMPHOCYTES # BLD AUTO: 2 K/UL (ref 1–4.8)
LYMPHOCYTES NFR BLD: 17.2 % (ref 18–48)
MAGNESIUM SERPL-MCNC: 1.5 MG/DL (ref 1.6–2.6)
MCH RBC QN AUTO: 27.5 PG (ref 27–31)
MCHC RBC AUTO-ENTMCNC: 32.6 G/DL (ref 32–36)
MCV RBC AUTO: 85 FL (ref 82–98)
MICROSCOPIC COMMENT: NORMAL
MONOCYTES # BLD AUTO: 1.2 K/UL (ref 0.3–1)
MONOCYTES NFR BLD: 10.5 % (ref 4–15)
NEUTROPHILS # BLD AUTO: 7.9 K/UL (ref 1.8–7.7)
NEUTROPHILS NFR BLD: 69.8 % (ref 38–73)
NITRITE UR QL STRIP: NEGATIVE
NRBC BLD-RTO: 0 /100 WBC
PH UR STRIP: 6 [PH] (ref 5–8)
PHOSPHATE SERPL-MCNC: 3.5 MG/DL (ref 2.7–4.5)
PLATELET # BLD AUTO: 249 K/UL (ref 150–450)
PMV BLD AUTO: 10.1 FL (ref 9.2–12.9)
POC IONIZED CALCIUM: 1.1 MMOL/L (ref 1.06–1.42)
POC TCO2 (MEASURED): 29 MMOL/L (ref 23–29)
POCT GLUCOSE: 142 MG/DL (ref 70–110)
POCT GLUCOSE: 151 MG/DL (ref 70–110)
POCT GLUCOSE: 171 MG/DL (ref 70–110)
POCT GLUCOSE: 228 MG/DL (ref 70–110)
POTASSIUM BLD-SCNC: 2.7 MMOL/L (ref 3.5–5.1)
POTASSIUM SERPL-SCNC: 4 MMOL/L (ref 3.5–5.1)
PROT SERPL-MCNC: 7.6 G/DL (ref 6–8.4)
PROT UR QL STRIP: NEGATIVE
RBC # BLD AUTO: 4.25 M/UL (ref 4–5.4)
RBC #/AREA URNS AUTO: 3 /HPF (ref 0–4)
SAMPLE: ABNORMAL
SODIUM BLD-SCNC: 137 MMOL/L (ref 136–145)
SODIUM SERPL-SCNC: 135 MMOL/L (ref 136–145)
SP GR UR STRIP: 1.01 (ref 1–1.03)
URN SPEC COLLECT METH UR: ABNORMAL
WBC # BLD AUTO: 11.36 K/UL (ref 3.9–12.7)
WBC #/AREA URNS AUTO: 3 /HPF (ref 0–5)

## 2023-04-19 PROCEDURE — 99223 1ST HOSP IP/OBS HIGH 75: CPT | Mod: HCNC,GC,, | Performed by: HOSPITALIST

## 2023-04-19 PROCEDURE — 52332 CYSTOSCOPY AND TREATMENT: CPT | Mod: HCNC,RT,, | Performed by: UROLOGY

## 2023-04-19 PROCEDURE — 27201423 OPTIME MED/SURG SUP & DEVICES STERILE SUPPLY: Mod: HCNC | Performed by: UROLOGY

## 2023-04-19 PROCEDURE — 36000707: Mod: HCNC | Performed by: UROLOGY

## 2023-04-19 PROCEDURE — D9220A PRA ANESTHESIA: Mod: HCNC,ANES,, | Performed by: ANESTHESIOLOGY

## 2023-04-19 PROCEDURE — 25000003 PHARM REV CODE 250: Mod: HCNC | Performed by: NURSE ANESTHETIST, CERTIFIED REGISTERED

## 2023-04-19 PROCEDURE — 52332 PR CYSTOSCOPY,INSERT URETERAL STENT: ICD-10-PCS | Mod: HCNC,RT,, | Performed by: UROLOGY

## 2023-04-19 PROCEDURE — 85025 COMPLETE CBC W/AUTO DIFF WBC: CPT | Mod: HCNC

## 2023-04-19 PROCEDURE — D9220A PRA ANESTHESIA: ICD-10-PCS | Mod: HCNC,CRNA,, | Performed by: NURSE ANESTHETIST, CERTIFIED REGISTERED

## 2023-04-19 PROCEDURE — 74420 UROGRAPHY RTRGR +-KUB: CPT | Mod: 26,HCNC,, | Performed by: UROLOGY

## 2023-04-19 PROCEDURE — 71000033 HC RECOVERY, INTIAL HOUR: Mod: HCNC | Performed by: UROLOGY

## 2023-04-19 PROCEDURE — C2617 STENT, NON-COR, TEM W/O DEL: HCPCS | Mod: HCNC | Performed by: UROLOGY

## 2023-04-19 PROCEDURE — 25500020 PHARM REV CODE 255: Mod: HCNC | Performed by: UROLOGY

## 2023-04-19 PROCEDURE — G0378 HOSPITAL OBSERVATION PER HR: HCPCS | Mod: HCNC

## 2023-04-19 PROCEDURE — 84100 ASSAY OF PHOSPHORUS: CPT | Mod: HCNC

## 2023-04-19 PROCEDURE — 25000003 PHARM REV CODE 250: Mod: HCNC | Performed by: STUDENT IN AN ORGANIZED HEALTH CARE EDUCATION/TRAINING PROGRAM

## 2023-04-19 PROCEDURE — 36000706: Mod: HCNC | Performed by: UROLOGY

## 2023-04-19 PROCEDURE — 63600175 PHARM REV CODE 636 W HCPCS: Mod: HCNC | Performed by: NURSE ANESTHETIST, CERTIFIED REGISTERED

## 2023-04-19 PROCEDURE — 63600175 PHARM REV CODE 636 W HCPCS: Mod: HCNC

## 2023-04-19 PROCEDURE — 99900035 HC TECH TIME PER 15 MIN (STAT): Mod: HCNC

## 2023-04-19 PROCEDURE — 96361 HYDRATE IV INFUSION ADD-ON: CPT

## 2023-04-19 PROCEDURE — D9220A PRA ANESTHESIA: Mod: HCNC,CRNA,, | Performed by: NURSE ANESTHETIST, CERTIFIED REGISTERED

## 2023-04-19 PROCEDURE — 99223 PR INITIAL HOSPITAL CARE,LEVL III: ICD-10-PCS | Mod: HCNC,GC,, | Performed by: HOSPITALIST

## 2023-04-19 PROCEDURE — 71000015 HC POSTOP RECOV 1ST HR: Mod: HCNC | Performed by: UROLOGY

## 2023-04-19 PROCEDURE — 83735 ASSAY OF MAGNESIUM: CPT | Mod: HCNC

## 2023-04-19 PROCEDURE — 27000221 HC OXYGEN, UP TO 24 HOURS: Mod: HCNC

## 2023-04-19 PROCEDURE — 80053 COMPREHEN METABOLIC PANEL: CPT | Mod: HCNC

## 2023-04-19 PROCEDURE — D9220A PRA ANESTHESIA: ICD-10-PCS | Mod: HCNC,ANES,, | Performed by: ANESTHESIOLOGY

## 2023-04-19 PROCEDURE — 25000003 PHARM REV CODE 250: Mod: HCNC

## 2023-04-19 PROCEDURE — 37000009 HC ANESTHESIA EA ADD 15 MINS: Mod: HCNC | Performed by: UROLOGY

## 2023-04-19 PROCEDURE — 37000008 HC ANESTHESIA 1ST 15 MINUTES: Mod: HCNC | Performed by: UROLOGY

## 2023-04-19 PROCEDURE — 96366 THER/PROPH/DIAG IV INF ADDON: CPT | Mod: 59

## 2023-04-19 PROCEDURE — 63600175 PHARM REV CODE 636 W HCPCS: Mod: HCNC | Performed by: ANESTHESIOLOGY

## 2023-04-19 PROCEDURE — C1769 GUIDE WIRE: HCPCS | Mod: HCNC | Performed by: UROLOGY

## 2023-04-19 PROCEDURE — 74420 PR  X-RAY RETROGRADE PYELOGRAM: ICD-10-PCS | Mod: 26,HCNC,, | Performed by: UROLOGY

## 2023-04-19 PROCEDURE — 82962 GLUCOSE BLOOD TEST: CPT | Mod: HCNC | Performed by: UROLOGY

## 2023-04-19 PROCEDURE — 96365 THER/PROPH/DIAG IV INF INIT: CPT | Mod: 59

## 2023-04-19 PROCEDURE — 94660 CPAP INITIATION&MGMT: CPT | Mod: HCNC

## 2023-04-19 PROCEDURE — 94761 N-INVAS EAR/PLS OXIMETRY MLT: CPT | Mod: HCNC

## 2023-04-19 PROCEDURE — 96372 THER/PROPH/DIAG INJ SC/IM: CPT

## 2023-04-19 DEVICE — STENT URETERAL UNIV 6FR 26CM
Type: IMPLANTABLE DEVICE | Site: URETER | Status: NON-FUNCTIONAL
Removed: 2023-05-12

## 2023-04-19 RX ORDER — SODIUM CHLORIDE 9 MG/ML
INJECTION, SOLUTION INTRAVENOUS CONTINUOUS
Status: DISCONTINUED | OUTPATIENT
Start: 2023-04-19 | End: 2023-04-19

## 2023-04-19 RX ORDER — AMOXICILLIN 250 MG
1 CAPSULE ORAL 2 TIMES DAILY
Status: DISCONTINUED | OUTPATIENT
Start: 2023-04-19 | End: 2023-04-20 | Stop reason: HOSPADM

## 2023-04-19 RX ORDER — PROPOFOL 10 MG/ML
VIAL (ML) INTRAVENOUS
Status: DISCONTINUED | OUTPATIENT
Start: 2023-04-19 | End: 2023-04-19

## 2023-04-19 RX ORDER — IPRATROPIUM BROMIDE AND ALBUTEROL SULFATE 2.5; .5 MG/3ML; MG/3ML
3 SOLUTION RESPIRATORY (INHALATION) EVERY 6 HOURS PRN
Status: DISCONTINUED | OUTPATIENT
Start: 2023-04-19 | End: 2023-04-20 | Stop reason: HOSPADM

## 2023-04-19 RX ORDER — IBUPROFEN 200 MG
24 TABLET ORAL
Status: DISCONTINUED | OUTPATIENT
Start: 2023-04-19 | End: 2023-04-19

## 2023-04-19 RX ORDER — IBUPROFEN 200 MG
16 TABLET ORAL
Status: DISCONTINUED | OUTPATIENT
Start: 2023-04-19 | End: 2023-04-19

## 2023-04-19 RX ORDER — CARVEDILOL 25 MG/1
25 TABLET ORAL 2 TIMES DAILY
Status: DISCONTINUED | OUTPATIENT
Start: 2023-04-19 | End: 2023-04-20 | Stop reason: HOSPADM

## 2023-04-19 RX ORDER — ONDANSETRON 4 MG/1
8 TABLET, ORALLY DISINTEGRATING ORAL EVERY 8 HOURS PRN
Status: DISCONTINUED | OUTPATIENT
Start: 2023-04-19 | End: 2023-04-20 | Stop reason: HOSPADM

## 2023-04-19 RX ORDER — INSULIN ASPART 100 [IU]/ML
0-5 INJECTION, SOLUTION INTRAVENOUS; SUBCUTANEOUS EVERY 6 HOURS PRN
Status: DISCONTINUED | OUTPATIENT
Start: 2023-04-19 | End: 2023-04-20 | Stop reason: HOSPADM

## 2023-04-19 RX ORDER — NALOXONE HCL 0.4 MG/ML
0.02 VIAL (ML) INJECTION
Status: DISCONTINUED | OUTPATIENT
Start: 2023-04-19 | End: 2023-04-20 | Stop reason: HOSPADM

## 2023-04-19 RX ORDER — HYDROMORPHONE HYDROCHLORIDE 1 MG/ML
1 INJECTION, SOLUTION INTRAMUSCULAR; INTRAVENOUS; SUBCUTANEOUS EVERY 4 HOURS PRN
Status: DISCONTINUED | OUTPATIENT
Start: 2023-04-19 | End: 2023-04-19

## 2023-04-19 RX ORDER — GLUCAGON 1 MG
1 KIT INJECTION
Status: DISCONTINUED | OUTPATIENT
Start: 2023-04-19 | End: 2023-04-19 | Stop reason: SDUPTHER

## 2023-04-19 RX ORDER — MAGNESIUM SULFATE HEPTAHYDRATE 40 MG/ML
2 INJECTION, SOLUTION INTRAVENOUS ONCE
Status: COMPLETED | OUTPATIENT
Start: 2023-04-19 | End: 2023-04-19

## 2023-04-19 RX ORDER — ATORVASTATIN CALCIUM 20 MG/1
40 TABLET, FILM COATED ORAL NIGHTLY
Status: DISCONTINUED | OUTPATIENT
Start: 2023-04-19 | End: 2023-04-20 | Stop reason: HOSPADM

## 2023-04-19 RX ORDER — SODIUM CHLORIDE 0.9 % (FLUSH) 0.9 %
3 SYRINGE (ML) INJECTION EVERY 30 MIN PRN
Status: DISCONTINUED | OUTPATIENT
Start: 2023-04-19 | End: 2023-04-20

## 2023-04-19 RX ORDER — POLYETHYLENE GLYCOL 3350 17 G/17G
17 POWDER, FOR SOLUTION ORAL DAILY
Status: DISCONTINUED | OUTPATIENT
Start: 2023-04-19 | End: 2023-04-20 | Stop reason: HOSPADM

## 2023-04-19 RX ORDER — OXYCODONE HYDROCHLORIDE 5 MG/1
5 TABLET ORAL EVERY 6 HOURS PRN
Status: DISCONTINUED | OUTPATIENT
Start: 2023-04-19 | End: 2023-04-20 | Stop reason: HOSPADM

## 2023-04-19 RX ORDER — AMLODIPINE BESYLATE 10 MG/1
10 TABLET ORAL DAILY
Status: DISCONTINUED | OUTPATIENT
Start: 2023-04-19 | End: 2023-04-20 | Stop reason: HOSPADM

## 2023-04-19 RX ORDER — DEXTROSE 40 %
30 GEL (GRAM) ORAL
Status: DISCONTINUED | OUTPATIENT
Start: 2023-04-19 | End: 2023-04-20 | Stop reason: HOSPADM

## 2023-04-19 RX ORDER — DEXTROSE 40 %
15 GEL (GRAM) ORAL
Status: DISCONTINUED | OUTPATIENT
Start: 2023-04-19 | End: 2023-04-20 | Stop reason: HOSPADM

## 2023-04-19 RX ORDER — ACETAMINOPHEN 325 MG/1
650 TABLET ORAL EVERY 4 HOURS PRN
Status: DISCONTINUED | OUTPATIENT
Start: 2023-04-19 | End: 2023-04-20 | Stop reason: HOSPADM

## 2023-04-19 RX ORDER — GLUCAGON 1 MG
1 KIT INJECTION
Status: DISCONTINUED | OUTPATIENT
Start: 2023-04-19 | End: 2023-04-20 | Stop reason: HOSPADM

## 2023-04-19 RX ORDER — OXYCODONE HYDROCHLORIDE 10 MG/1
10 TABLET ORAL EVERY 6 HOURS PRN
Status: DISCONTINUED | OUTPATIENT
Start: 2023-04-19 | End: 2023-04-20 | Stop reason: HOSPADM

## 2023-04-19 RX ORDER — ONDANSETRON 2 MG/ML
4 INJECTION INTRAMUSCULAR; INTRAVENOUS DAILY PRN
Status: DISCONTINUED | OUTPATIENT
Start: 2023-04-19 | End: 2023-04-20

## 2023-04-19 RX ORDER — TALC
6 POWDER (GRAM) TOPICAL NIGHTLY PRN
Status: DISCONTINUED | OUTPATIENT
Start: 2023-04-19 | End: 2023-04-20 | Stop reason: HOSPADM

## 2023-04-19 RX ORDER — PROPOFOL 10 MG/ML
VIAL (ML) INTRAVENOUS CONTINUOUS PRN
Status: DISCONTINUED | OUTPATIENT
Start: 2023-04-19 | End: 2023-04-19

## 2023-04-19 RX ORDER — TAMSULOSIN HYDROCHLORIDE 0.4 MG/1
0.4 CAPSULE ORAL DAILY
Status: DISCONTINUED | OUTPATIENT
Start: 2023-04-19 | End: 2023-04-20 | Stop reason: HOSPADM

## 2023-04-19 RX ORDER — SODIUM CHLORIDE 0.9 % (FLUSH) 0.9 %
10 SYRINGE (ML) INJECTION EVERY 12 HOURS PRN
Status: DISCONTINUED | OUTPATIENT
Start: 2023-04-19 | End: 2023-04-20 | Stop reason: HOSPADM

## 2023-04-19 RX ORDER — FENTANYL CITRATE 50 UG/ML
25 INJECTION, SOLUTION INTRAMUSCULAR; INTRAVENOUS EVERY 5 MIN PRN
Status: DISCONTINUED | OUTPATIENT
Start: 2023-04-19 | End: 2023-04-20

## 2023-04-19 RX ADMIN — TAMSULOSIN HYDROCHLORIDE 0.4 MG: 0.4 CAPSULE ORAL at 09:04

## 2023-04-19 RX ADMIN — SENNOSIDES AND DOCUSATE SODIUM 1 TABLET: 50; 8.6 TABLET ORAL at 09:04

## 2023-04-19 RX ADMIN — INSULIN ASPART 1 UNITS: 100 INJECTION, SOLUTION INTRAVENOUS; SUBCUTANEOUS at 08:04

## 2023-04-19 RX ADMIN — ATORVASTATIN CALCIUM 40 MG: 20 TABLET, FILM COATED ORAL at 08:04

## 2023-04-19 RX ADMIN — CEFTRIAXONE SODIUM 1 G: 1 INJECTION, SOLUTION INTRAVENOUS at 04:04

## 2023-04-19 RX ADMIN — SODIUM CHLORIDE: 0.9 INJECTION, SOLUTION INTRAVENOUS at 06:04

## 2023-04-19 RX ADMIN — AMLODIPINE BESYLATE 10 MG: 10 TABLET ORAL at 09:04

## 2023-04-19 RX ADMIN — PROPOFOL 40 MG: 10 INJECTION, EMULSION INTRAVENOUS at 04:04

## 2023-04-19 RX ADMIN — CARVEDILOL 25 MG: 25 TABLET, FILM COATED ORAL at 08:04

## 2023-04-19 RX ADMIN — MAGNESIUM SULFATE 2 G: 2 INJECTION INTRAVENOUS at 06:04

## 2023-04-19 RX ADMIN — INSULIN DETEMIR 15 UNITS: 100 INJECTION, SOLUTION SUBCUTANEOUS at 08:04

## 2023-04-19 RX ADMIN — SODIUM CHLORIDE: 9 INJECTION, SOLUTION INTRAVENOUS at 04:04

## 2023-04-19 RX ADMIN — SENNOSIDES AND DOCUSATE SODIUM 1 TABLET: 50; 8.6 TABLET ORAL at 08:04

## 2023-04-19 RX ADMIN — CARVEDILOL 25 MG: 25 TABLET, FILM COATED ORAL at 09:04

## 2023-04-19 RX ADMIN — FENTANYL CITRATE 25 MCG: 50 INJECTION INTRAMUSCULAR; INTRAVENOUS at 05:04

## 2023-04-19 RX ADMIN — ATORVASTATIN CALCIUM 40 MG: 20 TABLET, FILM COATED ORAL at 04:04

## 2023-04-19 RX ADMIN — POLYETHYLENE GLYCOL 3350 17 G: 17 POWDER, FOR SOLUTION ORAL at 09:04

## 2023-04-19 RX ADMIN — Medication 150 MCG/KG/MIN: at 04:04

## 2023-04-19 NOTE — ASSESSMENT & PLAN NOTE
"Per vasc sx note from 7/2022-"Start cilostazol 100mg BID. Continue asa 81 mg and start Atorvastatin 40 mg for medical management of peripheral artery disease."   -does not appear to be on ASA   -stopped pletal 2/2 nausea/vomiting  -continue eliquis, consider restarting ASA    "

## 2023-04-19 NOTE — SUBJECTIVE & OBJECTIVE
Past Medical History:   Diagnosis Date    Abnormality of lung 11/08/2011    Stable bandlike opacities at the lung bases, most likely representing      Anxiety     Arthritis     CAD (coronary artery disease)     Calculus of ureter 2/22/2011    CHF (congestive heart failure)     Chronic back pain 7/29/2012    Colon polyp 9/2010; 11/2010    Colon polyps 7/29/2012    Coronary artery disease involving native coronary artery of native heart with angina pectoris     Depression     Diabetes mellitus     Diabetes mellitus type II     Diverticulitis large intestine 7/27/2015    Diverticulitis of large intestine without perforation or abscess without bleeding     Diverticulosis 09/25/2010; 11/02/2010; 11/08/2011; 7/29/2012    Duodenal disorder 08/25/2011    Duodenal erosion noted on EGD.    Duodenal ulcer, unspecified as acute or chronic, without hemorrhage, perforation, or obstruction 8/24/2011    E. coli sepsis 12/2010    Due to left ureteral stone with left nephrostomy tube - hospitalized in [place]    Esophageal dysmotility 01/24/2012    Noted on upper GI-barium swallow.    Facial weakness 1969    Left facial weakness s/p left mastoidectomy in ~ 1969.    Fatty liver 11/08/2011    Reported on CT-abdomen and in 06/2012 Gastro clinic visit note.    Gastric polyp 09/29/2010    GERD (gastroesophageal reflux disease) 7/29/2012    Hepatomegaly 11/08/2011    Reported on CT-abdomen    Herpes zoster with other nervous system complications(053.19) 2/28/2011    Hiatal hernia 06/26/2006; 09/29/2010; 08/25/2011    Noted on barium swallow 2006; noted on  EGD 2011.    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    Trouble in sleeping     Type II or unspecified type diabetes mellitus with neurological manifestations, not stated as uncontrolled(250.60)     Type II or unspecified type diabetes mellitus with peripheral circulatory disorders, not stated as uncontrolled(250.70)     Type II or unspecified type diabetes mellitus with renal manifestations, not stated as uncontrolled(250.40)        Past Surgical History:   Procedure Laterality Date    BELPHAROPTOSIS REPAIR      s/p LAMBERTO levator repair - Dr. Dejesus    CARDIAC CATHETERIZATION      CARPAL TUNNEL RELEASE  3/13/2012    CATARACT EXTRACTION W/  INTRAOCULAR LENS IMPLANT Right 10/31/2018        CATARACT EXTRACTION W/  INTRAOCULAR LENS IMPLANT Left 03/22/2018        CHOLECYSTECTOMY      COLONOSCOPY N/A 11/16/2016    Procedure: COLONOSCOPY;  Surgeon: Chris Storey MD;  Location: [address]);  Service: Endoscopy;  Laterality: N/A;    COLONOSCOPY N/A 6/14/2017    Procedure: COLONOSCOPY;  Surgeon: Chris Storey MD;  Location: [address]);  Service: Endoscopy;  Laterality: N/A;  colonoscopy in 3 months with better bowel prep. - Split PEG prep ordered    ESOPHAGOGASTRODUODENOSCOPY N/A 11/10/2021    Procedure: EGD (ESOPHAGOGASTRODUODENOSCOPY);  Surgeon: Kuldeep Velázquez MD;  Location: [address]);  Service: Endoscopy;  Laterality: N/A;  11/4-eliquis hold ok see te -tb-fully vacc-inst mail and verbal-    EXTRACORPOREAL SHOCK WAVE LITHOTRIPSY      INJECTION OF ANESTHETIC AGENT AROUND NERVE Bilateral 6/24/2020    Procedure: BLOCK, NERVE,  C4-C5-C6 MEDIAL BRANCH ok per [place] to add on;  Surgeon: Oscar Ravi MD;  Location: [facility];  Service: Pain Management;  Laterality: Bilateral;    INTRAOCULAR PROSTHESES INSERTION Right 10/31/2018    Procedure: INSERTION-INTRAOCULAR LENS (IOL);  Surgeon: Keysha Valle MD;  Location: [facility] OR;  Service: Ophthalmology;  Laterality: Right;    JOINT REPLACEMENT      mastoid tumor removal  1969    Left mastoidectomy with residual left facial weakness.    NEPHROSTOMY      OOPHORECTOMY      PHACOEMULSIFICATION OF CATARACT Right 10/31/2018    Procedure: PHACOEMULSIFICATION-ASPIRATION-CATARACT;  Surgeon: Keysha Valle MD;  Location: [facility] OR;  Service: Ophthalmology;  Laterality: Right;    TOTAL ABDOMINAL HYSTERECTOMY  1969    TAHBSO    TOTAL KNEE ARTHROPLASTY  9/13/2012    Left    TRANSFORAMINAL EPIDURAL INJECTION OF STEROID Bilateral 9/17/2019    Procedure: INJECTION, STEROID, EPIDURAL, TRANSFORAMINAL APPROACH;  Surgeon: Oscar Ravi MD;  Location: [facility];  Service: Pain Management;  Laterality: Bilateral;  B/L TFESI L4/L5    TRANSFORAMINAL EPIDURAL INJECTION OF STEROID Bilateral 10/2/2020    Procedure: INJECTION, STEROID, EPIDURAL, TRANSFORAMINAL APPROACH;  Surgeon: Oscar Ravi MD;  Location: [facility];  Service: Pain Management;  Laterality: Bilateral;  B/L TFESI L4/5    TRANSFORAMINAL EPIDURAL INJECTION OF STEROID Bilateral 5/7/2021    Procedure: INJECTION, STEROID, EPIDURAL, TRANSFORAMINAL APPROACH, L4-L5 clear to hold Eliquis 3 days;  Surgeon: Oscar Ravi MD;  Location: [facility];  Service: Pain Management;  Laterality: Bilateral;       Review of patient's allergies indicates:   Allergen Reactions    Crestor [rosuvastatin]      Cramping in legs    Ezetimibe Diarrhea     Other reaction(s): abdominal pain, Diarrhea      Hydrocodone Itching    Lisinopril      Other reaction(s): cough      Sulfa (sulfonamide antibiotics) Itching and Rash           Sulfamethoxazole      Sulfamethoxazole-trimethoprim     Valsartan Swelling       Family History       Problem Relation (Age of Onset)    Alcohol abuse Brother    Breast cancer Sister, Maternal Aunt    Cancer Mother, Sister    Cirrhosis Brother    Dementia Brother, Brother    Diabetes Sister, Sister, Sister, Sister, Brother, Other    Drug abuse Brother, Brother    Frontotemporal dementia Brother    Heart attack Father, Sister    Heart disease Father    Hypertension Mother    Liver cancer Brother    Lupus Brother, Sister    No Known Problems Daughter, Son, Daughter, Son, Son, Maternal Grandmother, Maternal Grandfather, Paternal Grandmother, Paternal Grandfather, Maternal Uncle, Paternal Aunt, Paternal Uncle    Ovarian cancer Other    Sleep apnea Sister            Tobacco Use    Smoking status: Former     Packs/day: 1.00     Years: 15.00     Pack years: 15.00     Types: Cigarettes     Quit date: 2000     Years since quittin.7    Smokeless tobacco: Never   Substance and Sexual Activity    Alcohol use: No    Drug use: No    Sexual activity: Never       Review of Systems   Constitutional:  Negative for chills and fever.   Genitourinary:  Positive for hematuria. Negative for dysuria and frequency.     Objective:     Temp:  [98.6 °F (37 °C)-98.7 °F (37.1 °C)] 98.6 °F (37 °C)  Pulse:  [58-87] 67  Resp:  [9-28] 9  SpO2:  [94 %-99 %] 95 %  BP: (128-183)/(62-84) 180/84     Body mass index is 30.41 kg/m².           Drains       Drain  Duration             Female External Urinary Catheter 23 <1 day                    Physical Exam  Constitutional:       General: She is not in acute distress.     Appearance: Normal appearance.   HENT:      Head: Normocephalic.   Eyes:      Pupils: Pupils are equal, round, and reactive to light.   Cardiovascular:      Rate and Rhythm: Normal rate.   Pulmonary:      Effort: Pulmonary effort is normal. No respiratory distress.   Chest:      Chest wall: No tenderness.   Abdominal:      General: Abdomen  is flat. There is no distension.      Palpations: Abdomen is soft.      Tenderness: There is abdominal tenderness (right lower abdomen). There is right CVA tenderness. There is no left CVA tenderness.   Musculoskeletal:         General: Normal range of motion.      Cervical back: Normal range of motion.   Neurological:      General: No focal deficit present.      Mental Status: She is alert and oriented to person, place, and time.   Psychiatric:         Mood and Affect: Mood normal.         Behavior: Behavior normal.       Significant Labs:    BMP:  Recent Labs   Lab 04/18/23  1807   *   K 3.6   CL 94*   CO2 28   BUN 39*   CREATININE 2.1*   CALCIUM 10.1       CBC:  Recent Labs   Lab 04/18/23 1807 04/18/23  1816   WBC 11.75  --    HGB 12.6  --    HCT 38.2 41     --        All pertinent labs results from the past 24 hours have been reviewed.    Significant Imaging:  CT: I have reviewed all results within the past 24 hours and my personal findings are:  pertinent findings in HPI

## 2023-04-19 NOTE — NURSING TRANSFER
Nursing Transfer Note      4/19/2023     Reason patient is being transferred: post procedure     Transfer To: [id number]    Transfer via bed    Transfer with 2L NC O2    Transported by transport     Medicines sent: none     Any special needs or follow-up needed: routine     Chart send with patient: Yes    Notified: relative     Patient reassessed at: 1730 on 4/19

## 2023-04-19 NOTE — HPI
Ms. Villasenor is a 83 year old female PMHx of PAD, peripheral neuropathy, IDDM2, HTN, prior DVT on Eliquis, CKD, CAD, nephrolithiasis, cholecystectomy, diverticulitis, MARLON on CPAP who presents with abdominal/back pain present for 5 days. Patient states she started having abdominal pain on Friday. Pain is in lower abdomen and spreads into her R flank and back. Since the pain started she has not had much of an appetite. Has been staying in bed due to feeling fatigued. Also has noticed some shortness of breath which did not improve with her inhaler. Has also had some urinary frequency and urgency. Felt like she had dark colored, liquid bowel movement a few days ago but no normal bowel movement in the past few days.  In the ED, she was afebrile, vitals stable w/ hypertension. Labs showed SCr 2.1 (baseline around 1.1), BUN 39, UA 2+ occult blood. CT AP showed severe right hydroureteronephrosis w/ 8 mm stone at the ureteral vesicular junction. Urology was consulted with plans for stent placement. Admitted to  for further management.

## 2023-04-19 NOTE — HPI
"Henrietta Villasenor is a 83 y.o. female with pmh of CAD, HTN, HLD, DM, Nephrolithiasis, prior DVT presenting to the ED wth 5 day complaint of fatigue, decreased appetite, and right flank pain. Urology consulted for right 8mm UVJ stone with hydroureteronephrosis and HANNAH.    She states she has had colicky right flank pain for "quite some time", but recently developed worsening groin and abdominal pain. C/o fatigue and decreased appetite for the last 5 days. Denies any voiding difficulties. Denies hematuria, dysuria, fevers, chills. Prior history of stones with a right ESWL in 2011 with Dr. Peguero. AFVSS    Labs: Cr 2.3 baseline ~1.0, WBC 11.8, Hgb 12.6  Urine: non-concerning for infection. Nitrite negative. No bacteria  CTRSS: Right 8mm UVJ stone with hydroureteronephrosis proximal, possible right ureterocele, non-onstructing stones in the mid and lower pole right kidney, left collecting system without hydronephrosis or urolithiasis, small amount of parenchymal thinning of the right kidney compared to the left  Prior CT from 5/2022 unable to be opened in Epic at this time  "

## 2023-04-19 NOTE — ASSESSMENT & PLAN NOTE
Patient is identified as having Diastolic (HFpEF) heart failure that is Chronic. CHF is currently controlled. Latest ECHO performed and demonstrates- Results for orders placed during the hospital encounter of 01/25/22    Echo    Interpretation Summary  · The estimated ejection fraction is 55%.  · The left ventricle is normal in size with concentric hypertrophy and normal systolic function.  · Grade I left ventricular diastolic dysfunction.  · Normal right ventricular size with normal right ventricular systolic function.  · Moderate left atrial enlargement.  · There is right ventricular hypertrophy.  · Mild tricuspid regurgitation.  · The estimated PA systolic pressure is 35 mmHg.  · Intermediate central venous pressure (8 mmHg).  . Continue Beta Blocker and monitor clinical status closely. Monitor on telemetry. Patient is off CHF pathway.  Monitor strict Is&Os and daily weights.  Place on fluid restriction of 2 L. Continue to stress to patient importance of self efficacy and  on diet for CHF. Last BNP reviewed- and noted below   Recent Labs   Lab 04/18/23  1807   BNP 55   .    Will hold lasix given HANNAH and no signs of CHF exacerbation at this time

## 2023-04-19 NOTE — ED NOTES
Pt identifiers Henrietta Villasenor were checked  and are correct   LOC: The patient is awake, alert, aware of environment with an appropriate affect. Oriented x4 speaking appropriately  APPEARANCE: Pt rates abd pain an 8/10 ,in no acute distress, pt is clean and well groomed, clothing properly fastened  SKIN: Skin warm, dry and intact, normal skin turgor, moist mucus membranes  RESPIRATORY: Airway is open and patent, respirations are spontaneous, even and unlabored, normal effort and rate Breath sounds clear sadie to all lung fields on auscultation  CARDIAC: Normal rate and rhythm, no peripheral edema noted, capillary refill < 3 seconds, bilateral radial pulses 2+  ABDOMEN: Soft,  nondistended. Bowel sounds present to all four quad of abd on auscultation  NEUROLOGIC: PERRL, facial expression is symmetrical, patient moving all extremities spontaneously, normal sensation in all extremities when touched with a finger.  Follows all commands appropriately  MUSCULOSKELETAL: No obvious deformities.

## 2023-04-19 NOTE — ASSESSMENT & PLAN NOTE
Patient with acute kidney injury likely due to IVVD/dehydration and post-obstructive d/t UVJ stone HANNAH is currently stable. Labs reviewed- Renal function/electrolytes with Estimated Creatinine Clearance: 23.9 mL/min (A) (based on SCr of 2.1 mg/dL (H)). according to latest data. Monitor urine output and serial BMP and adjust therapy as needed. Avoid nephrotoxins and renally dose meds for GFR listed above.   -maintenance fluids until s/p stent placement

## 2023-04-19 NOTE — TRANSFER OF CARE
Anesthesia Transfer of Care Note    Patient: Henrietta Villasenor    Procedure(s) Performed: Procedure(s) (LRB):  CYSTOSCOPY  INSERTION, STENT, URETER (Right)  PYELOGRAM, RETROGRADE (Right)    Patient location: PACU    Anesthesia Type: MAC    Transport from OR: Transported from OR on 6-10 L/min O2 by face mask with adequate spontaneous ventilation    Post pain: adequate analgesia    Post assessment: no apparent anesthetic complications    Post vital signs: stable    Level of consciousness: awake    Nausea/Vomiting: no nausea/vomiting    Complications: none    Transfer of care protocol was followed      Last vitals:   Visit Vitals  /65 (BP Location: Left arm, Patient Position: Lying)   Pulse (!) 57   Temp 36.4 °C (97.6 °F) (Temporal)   Resp 14   Wt 90.7 kg (200 lb)   LMP  (LMP Unknown)   SpO2 (!) 93%   Breastfeeding No   BMI 30.41 kg/m²

## 2023-04-19 NOTE — ASSESSMENT & PLAN NOTE
-CT reveals right 8mm UVJ stone and right hydroureteronephrosis. Also presents with an HANNAH  -Consented for right ureteral stent  -NPO  -Agree with admission to  for comorbidities and HANNAH  -mIVF  -If patient becomes clinically unstable, please call Urology for more urgent ureteral stent placement

## 2023-04-19 NOTE — SUBJECTIVE & OBJECTIVE
Interval History: NAEON. AF. Hypertensive overnight. Pain controlled. No UOP recorded but patient reports voiding once overnight without difficulty. NPO.       Objective:     Temp:  [97.3 °F (36.3 °C)-98.7 °F (37.1 °C)] 97.3 °F (36.3 °C)  Pulse:  [58-87] 68  Resp:  [9-28] 16  SpO2:  [93 %-99 %] 94 %  BP: (128-183)/(62-84) 171/72     Body mass index is 30.41 kg/m².           Drains       Drain  Duration             Female External Urinary Catheter 04/18/23 1947 <1 day                    Physical Exam  Vitals reviewed.   Constitutional:       General: She is not in acute distress.     Appearance: She is not diaphoretic.   HENT:      Head: Normocephalic and atraumatic.      Nose: Nose normal.   Eyes:      Conjunctiva/sclera: Conjunctivae normal.   Cardiovascular:      Rate and Rhythm: Normal rate.   Pulmonary:      Effort: Pulmonary effort is normal. No respiratory distress.   Abdominal:      General: There is no distension.   Musculoskeletal:         General: Normal range of motion.      Cervical back: Normal range of motion.   Skin:     General: Skin is dry.   Neurological:      Mental Status: She is alert.   Psychiatric:         Behavior: Behavior normal.         Thought Content: Thought content normal.       Significant Labs:    BMP:  Recent Labs   Lab 04/18/23  1807   *   K 3.6   CL 94*   CO2 28   BUN 39*   CREATININE 2.1*   CALCIUM 10.1       CBC:   Recent Labs   Lab 04/18/23 1807 04/18/23  1816   WBC 11.75  --    HGB 12.6  --    HCT 38.2 41     --        All pertinent labs results from the past 24 hours have been reviewed.    Significant Imaging:  All pertinent imaging results/findings from the past 24 hours have been reviewed.

## 2023-04-19 NOTE — ASSESSMENT & PLAN NOTE
Home meds include cymbalta and percocet  -cymbalta held at this time due to decreased renal function

## 2023-04-19 NOTE — H&P
"[facility] ([address] Medicine  History & Physical    Patient Name: Henrietta Villasenor  MRN: [id number]  Patient Class: OP- Observation  Admission Date: 4/18/2023  Attending Physician: Chad Gr MD   Primary Care Provider: Jeannine Arriaga MD         Patient information was obtained from patient and ER records.     Subjective:     Principal Problem:Nephrolithiasis    Chief Complaint:   Chief Complaint   Patient presents with    Abdominal Pain     Constipated. Last BM 5 days ago. Reported to clinic earlier today, providers there concerned for dyspnea w/ exertion. Not requiring o2 at triage. On arrival pt only c/o of abdominal pain and constipation, reports she is "not worried about the SOB."         HPI: Ms. Villasenor is a 83 year old female PMHx of PAD, peripheral neuropathy, IDDM2, HTN, prior DVT on Eliquis, CKD, CAD, nephrolithiasis, cholecystectomy, diverticulitis, MARLON on CPAP who presents with abdominal/back pain present for 5 days. Patient states she started having abdominal pain on Friday. Pain is in lower abdomen and spreads into her R flank and back. Since the pain started she has not had much of an appetite. Has been staying in bed due to feeling fatigued. Also has noticed some shortness of breath which did not improve with her inhaler. Has also had some urinary frequency and urgency. Felt like she had dark colored, liquid bowel movement a few days ago but no normal bowel movement in the past few days.  In the ED, she was afebrile, vitals stable w/ hypertension. Labs showed SCr 2.1 (baseline around 1.1), BUN 39, UA 2+ occult blood. CT AP showed severe right hydroureteronephrosis w/ 8 mm stone at the ureteral vesicular junction. Urology was consulted with plans for stent placement. Admitted to  for further management.          Past Medical History:   Diagnosis Date    Abnormality of lung 11/08/2011    Stable bandlike opacities at the lung bases, most likely representing      " Anxiety     Arthritis     CAD (coronary artery disease)     Calculus of ureter 2/22/2011    CHF (congestive heart failure)     Chronic back pain 7/29/2012    Colon polyp 9/2010; 11/2010    Colon polyps 7/29/2012    Coronary artery disease involving native coronary artery of native heart with angina pectoris     Depression     Diabetes mellitus     Diabetes mellitus type II     Diverticulitis large intestine 7/27/2015    Diverticulitis of large intestine without perforation or abscess without bleeding     Diverticulosis 09/25/2010; 11/02/2010; 11/08/2011; 7/29/2012    Duodenal disorder 08/25/2011    Duodenal erosion noted on EGD.    Duodenal ulcer, unspecified as acute or chronic, without hemorrhage, perforation, or obstruction 8/24/2011    E. coli sepsis 12/2010    Due to left ureteral stone with left nephrostomy tube - hospitalized in [place]    Esophageal dysmotility 01/24/2012    Noted on upper GI-barium swallow.    Facial weakness 1969    Left facial weakness s/p left mastoidectomy in ~ 1969.    Fatty liver 11/08/2011    Reported on CT-abdomen and in 06/2012 Gastro clinic visit note.    Gastric polyp 09/29/2010    GERD (gastroesophageal reflux disease) 7/29/2012    Hepatomegaly 11/08/2011    Reported on CT-abdomen    Herpes zoster with other nervous system complications(053.19) 2/28/2011    Hiatal hernia 06/26/2006; 09/29/2010; 08/25/2011    Noted on barium swallow 2006; noted on  EGD 2011.   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    Trouble in sleeping     Type II or unspecified type diabetes mellitus with neurological manifestations, not stated as uncontrolled(250.60)     Type II or unspecified type diabetes mellitus with peripheral circulatory disorders, not stated as uncontrolled(250.70)     Type II or unspecified type diabetes mellitus with renal manifestations, not stated as uncontrolled(250.40)        Past Surgical History:   Procedure Laterality Date    BELPHAROPTOSIS REPAIR      s/p LAMBERTO levator repair - Dr. Dejesus    CARDIAC CATHETERIZATION      CARPAL TUNNEL RELEASE  3/13/2012    CATARACT EXTRACTION W/  INTRAOCULAR LENS IMPLANT Right 10/31/2018        CATARACT EXTRACTION W/  INTRAOCULAR LENS IMPLANT Left 03/22/2018        CHOLECYSTECTOMY      COLONOSCOPY N/A 11/16/2016    Procedure: COLONOSCOPY;  Surgeon: Chris Storey MD;  Location: [facility] ([address] [place] [address]);  Service: Endoscopy;  Laterality: N/A;    COLONOSCOPY N/A 6/14/2017    Procedure: COLONOSCOPY;  Surgeon: Chris Storey MD;  [facility] ([facility]  Service: Endoscopy;  Laterality: N/A;  colonoscopy in 3 months with better bowel prep. - Split PEG prep ordered    ESOPHAGOGASTRODUODENOSCOPY N/A 11/10/2021    Procedure: EGD (ESOPHAGOGASTRODUODENOSCOPY);  Surgeon: Kuldeep Velázquez MD;  [facility]  Service: Endoscopy;  Laterality: N/A;  11/4-eliquis hold ok see te -tb-fully vacc-inst mail and verbal-    EXTRACORPOREAL SHOCK WAVE LITHOTRIPSY      INJECTION OF ANESTHETIC AGENT AROUND NERVE Bilateral 6/24/2020    Procedure: BLOCK, NERVE, C4-C5-C6 MEDIAL BRANCH ok per Talia to add on;  Surgeon: Oscar Ravi MD;  Location:  [facility];  Service: Pain Management;  Laterality: Bilateral;    INTRAOCULAR PROSTHESES INSERTION Right 10/31/2018    Procedure: INSERTION-INTRAOCULAR LENS (IOL);  Surgeon: Keysha Valle MD;  Location: [facility] OR;  Service: Ophthalmology;  Laterality: Right;    JOINT REPLACEMENT      mastoid tumor removal  1969    Left mastoidectomy with residual left facial weakness.    NEPHROSTOMY      OOPHORECTOMY      PHACOEMULSIFICATION OF CATARACT Right 10/31/2018    Procedure: PHACOEMULSIFICATION-ASPIRATION-CATARACT;  Surgeon: Keysha Valle MD;  Location: [facility] OR;  Service: Ophthalmology;  Laterality: Right;    TOTAL ABDOMINAL HYSTERECTOMY  1969    TAHBSO    TOTAL KNEE ARTHROPLASTY  9/13/2012    Left    TRANSFORAMINAL EPIDURAL INJECTION OF STEROID Bilateral 9/17/2019    Procedure: INJECTION, STEROID, EPIDURAL, TRANSFORAMINAL APPROACH;  Surgeon: Oscar Ravi MD;  Location: [facility];  Service: Pain Management;  Laterality: Bilateral;  B/L TFESI L4/L5    TRANSFORAMINAL EPIDURAL INJECTION OF STEROID Bilateral 10/2/2020    Procedure: INJECTION, STEROID, EPIDURAL, TRANSFORAMINAL APPROACH;  Surgeon: Oscar Ravi MD;  Location: [facility];  Service: Pain Management;  Laterality: Bilateral;  B/L TFESI L4/5    TRANSFORAMINAL EPIDURAL INJECTION OF STEROID Bilateral 5/7/2021    Procedure: INJECTION, STEROID, EPIDURAL, TRANSFORAMINAL APPROACH, L4-L5 clear to hold Eliquis 3 days;  Surgeon: Oscar Ravi MD;  Location: [facility];  Service: Pain Management;  Laterality: Bilateral;       Review of patient's allergies indicates:   Allergen Reactions    Crestor [rosuvastatin]      Cramping in legs    Ezetimibe Diarrhea     Other reaction(s): abdominal pain, Diarrhea      Hydrocodone Itching    Lisinopril      Other reaction(s): cough      Sulfa (sulfonamide antibiotics) Itching and Rash           Sulfamethoxazole     Sulfamethoxazole-trimethoprim     Valsartan Swelling       Current  "Facility-Administered Medications on File Prior to Encounter   Medication    0.9%  NaCl infusion    tetracaine HCl (PF) 0.5 % Drop 1 drop     Current Outpatient Medications on File Prior to Encounter   Medication Sig    ACCU-CHEK GUIDE TEST STRIPS Strp TEST BLOOD SUGAR THREE TIMES DAILY    ACCU-CHEK SOFT DEV LANCETS Kit     amLODIPine (NORVASC) 10 MG tablet Take 1 tablet (10 mg total) by mouth once daily.    apixaban (ELIQUIS) 5 mg Tab Take 1 tablet (5 mg total) by mouth 2 (two) times daily.    atorvastatin (LIPITOR) 40 MG tablet Take 1 tablet (40 mg total) by mouth once daily.    BD ULTRA-FINE OLU PEN NEEDLE 32 gauge x 5/32" Ndle Uses 4 times daily, on multiple daily insulin injections    carvediloL (COREG) 25 MG tablet Take 1 tablet (25 mg total) by mouth 2 (two) times daily.    cholecalciferol, vitamin D3, 125 mcg (5,000 unit) capsule Take 5,000 Units by mouth once daily.    DROPLET PEN NEEDLE 29 gauge x 1/2" Ndle USE FOUR TIMES DAILY    DULoxetine (CYMBALTA) 60 MG capsule Take 1 capsule (60 mg total) by mouth 2 (two) times daily.    fluticasone propionate (FLONASE) 50 mcg/actuation nasal spray Instill 2 sprays in each nostril once daily.    furosemide (LASIX) 40 MG tablet Take  1/2-1  tablet by mouth once daily    insulin (LANTUS SOLOSTAR U-100 INSULIN) glargine 100 units/mL (3mL) SubQ pen Inject 60 Units into the skin once daily.    lancets (ACCU-CHEK SOFTCLIX LANCETS) Misc TEST BLOOD SUGAR FOUR TIMES DAILY    loratadine (CLARITIN) 10 mg tablet Take 1 tablet (10 mg total) by mouth once daily.    pantoprazole (PROTONIX) 40 MG tablet Take 1 tablet (40 mg total) by mouth once daily.    potassium chloride (KLOR-CON) 10 MEQ TbSR Take 1 tablet (10 mEq total) by mouth once daily.    rOPINIRole (REQUIP) 0.5 MG tablet Take 1 tablet (0.5 mg total) by mouth every evening.    albuterol (PROVENTIL/VENTOLIN HFA) 90 mcg/actuation inhaler Inhale 2 puffs by mouth into the lungs every 4 (four) hours as " needed for Wheezing. Rescue    blood-glucose meter kit Use as instructed    DROPSAFE ALCOHOL PREP PADS PadM USE TOPICALLY TO TEST BLOOD SUGAR FOUR TIMES DAILY    insulin aspart U-100 (NOVOLOG) 100 unit/mL (3 mL) InPn pen Inject 24 Units into the skin before breakfast AND 28 Units daily with lunch AND 28 Units before dinner.    olopatadine (PATADAY) 0.2 % Drop Place 1 drop into both eyes once daily.    [START ON 2023] oxyCODONE-acetaminophen (PERCOCET)  mg per tablet Take 1 tablet by mouth 5 (five) times daily.    oxyCODONE-acetaminophen (PERCOCET)  mg per tablet Take 1 tablet by mouth 5 (five) times daily.    oxyCODONE-acetaminophen (PERCOCET)  mg per tablet Take 1 tablet by mouth 5 (five) times daily.    triamcinolone acetonide 0.1% (KENALOG) 0.1 % cream Apply topically 2 (two) times daily.     Family History       Problem Relation (Age of Onset)    Alcohol abuse Brother    Breast cancer Sister, Maternal Aunt    Cancer Mother, Sister    Cirrhosis Brother    Dementia Brother, Brother    Diabetes Sister, Sister, Sister, Sister, Brother, Other    Drug abuse Brother, Brother    Frontotemporal dementia Brother    Heart attack Father, Sister    Heart disease Father    Hypertension Mother    Liver cancer Brother    Lupus Brother, Sister    No Known Problems Daughter, Son, Daughter, Son, Son, Maternal Grandmother, Maternal Grandfather, Paternal Grandmother, Paternal Grandfather, Maternal Uncle, Paternal Aunt, Paternal Uncle    Ovarian cancer Other    Sleep apnea Sister          Tobacco Use    Smoking status: Former     Packs/day: 1.00     Years: 15.00     Pack years: 15.00     Types: Cigarettes     Quit date: 2000     Years since quittin.7    Smokeless tobacco: Never   Substance and Sexual Activity    Alcohol use: No    Drug use: No    Sexual activity: Never     Review of Systems   Constitutional:  Positive for appetite change and fatigue. Negative for chills and fever.    Respiratory:  Positive for shortness of breath. Negative for chest tightness.    Cardiovascular:  Negative for chest pain and leg swelling.   Gastrointestinal:  Positive for abdominal pain and constipation. Negative for abdominal distention, nausea and vomiting.   Genitourinary:  Positive for frequency and urgency.   Musculoskeletal:  Positive for back pain and gait problem (states frequent falls in past month or so).   Skin:  Positive for color change and rash.   Neurological:  Negative for light-headedness and headaches.   Objective:     Vital Signs (Most Recent):  Temp: 97.3 °F (36.3 °C) (04/19/23 0131)  Pulse: 68 (04/19/23 0300)  Resp: 16 (04/19/23 0300)  BP: (!) 171/72 (04/19/23 0300)  SpO2: (!) 94 % (04/19/23 0300)   Vital Signs (24h Range):  Temp:  [97.3 °F (36.3 °C)-98.7 °F (37.1 °C)] 97.3 °F (36.3 °C)  Pulse:  [58-87] 68  Resp:  [9-28] 16  SpO2:  [93 %-99 %] 94 %  BP: (128-183)/(62-84) 171/72     Weight: 90.7 kg (200 lb)  Body mass index is 30.41 kg/m².    Physical Exam  Vitals and nursing note reviewed.   Constitutional:       General: She is not in acute distress.     Appearance: Normal appearance.   HENT:      Mouth/Throat:      Mouth: Mucous membranes are moist.      Pharynx: Oropharynx is clear.   Cardiovascular:      Rate and Rhythm: Normal rate and regular rhythm.      Pulses: Normal pulses.      Heart sounds: No murmur heard.  Pulmonary:      Effort: Pulmonary effort is normal.      Breath sounds: Normal breath sounds.   Abdominal:      General: Abdomen is flat. There is no distension.      Tenderness: There is abdominal tenderness (RLQ). There is right CVA tenderness.   Musculoskeletal:         General: No swelling.      Right lower leg: No edema.      Left lower leg: No edema.   Skin:     General: Skin is warm and dry.      Capillary Refill: Capillary refill takes less than 2 seconds.   Neurological:      General: No focal deficit present.      Mental Status: She is alert and oriented to person,  place, and time.   Psychiatric:         Mood and Affect: Mood normal.         Behavior: Behavior normal.           Significant Labs: All pertinent labs within the past 24 hours have been reviewed.  CBC:   Recent Labs   Lab 04/18/23  1807 04/18/23  1816   WBC 11.75  --    HGB 12.6  --    HCT 38.2 41     --      CMP:   Recent Labs   Lab 04/18/23  1807   *   K 3.6   CL 94*   CO2 28   *   BUN 39*   CREATININE 2.1*   CALCIUM 10.1   PROT 8.4   ALBUMIN 3.3*   BILITOT 0.8   ALKPHOS 100   AST 19   ALT 12   ANIONGAP 13     Urine Studies:   Recent Labs   Lab 04/18/23  2334   COLORU Yellow   APPEARANCEUA Clear   PHUR 6.0   SPECGRAV 1.010   PROTEINUA Negative   GLUCUA Negative   KETONESU Negative   BILIRUBINUA Negative   OCCULTUA 2+*   NITRITE Negative   LEUKOCYTESUR Negative   RBCUA 3   WBCUA 3       Significant Imaging: I have reviewed all pertinent imaging results/findings within the past 24 hours.  Imaging Results               CT Abdomen Pelvis  Without Contrast (Final result)  Result time 04/18/23 22:56:04      Final result by Jevon Gonzalez MD (04/18/23 22:56:04)                   Impression:      Severe right hydroureteronephrosis to the level of an 8 mm stone at the ureteral vesicular junction.  Findings considering for obstructive uropathy.  Additional nonobstructing calculi lower pole collecting system right kidney.  Consider urology consultation.    Additional stable findings as above.    This report was flagged in Epic as abnormal.    Electronically signed by resident: Jacob Ortiz  Date:    04/18/2023  Time:    22:13    Electronically signed by: Jevon Gonzalez MD  Date:    04/18/2023  Time:    22:56               Narrative:    EXAMINATION:  CT ABDOMEN PELVIS WITHOUT CONTRAST    CLINICAL HISTORY:  Abdominal abscess/infection suspected;Bowel obstruction suspected;    TECHNIQUE:  Axial CT images of the abdomen and pelvis were obtained via helical, multi detector CT technique.  No intravenous  contrast material was administered.    COMPARISON:  CT renal stone 04/28/2021.    FINDINGS:  Heart: No cardiomegaly or pericardial effusion.  Coronary artery calcific atherosclerosis.  Valvular calcifications.    Lung Bases: Bibasilar bandlike subsegmental atelectasis versus scarring.  No large focal consolidation, pleural effusion, pneumothorax.    Liver: Normal in size and attenuation without focal hepatic abnormality.    Gallbladder: Surgically absent.    Bile Ducts: No intra or extrahepatic biliary ductal dilation.    Pancreas: No pancreatic mass lesion or peripancreatic inflammatory change.    Spleen: Normal.    Adrenals: Normal.    Genitourinary: Severe right hydroureteronephrosis to the level of an 8 mm stone at the ureterovesicular junction.  Suspected small right ureterocele.  Mild right perinephric and periureteric stranding.  Additional nephroliths within the right renal calices.  Left kidney is unremarkable.    Bladder demonstrates smooth contours without bladder wall thickening.    Reproductive organs: Uterus is surgically absent.    GI tract/Mesentery: Stomach is normal in appearance.  No evidence for bowel obstruction.  Appendix appears unremarkable.  Extensive colonic diverticulosis without evidence of acute diverticulitis.    Peritoneal Space: No abdominopelvic ascites or intraperitoneal free air.    Retroperitoneum: No significant adenopathy.    Abdominal wall: Postoperative change of the anterior abdominal wall.  Subcutaneous calcifications overlie the bilateral gluteal musculature, likely injection related.    Vasculature: Abdominal aorta is normal in caliber.  Advanced aortoiliac calcific atherosclerosis including branch vessels.    Bones: Degenerative change without acute fracture or bony destructive process.  Minimal anterolisthesis of L4 on L5.                                       X-Ray Chest AP Portable (Final result)  Result time 04/18/23 20:17:48      Final result by Timbo Baumann MD  (04/18/23 20:17:48)                   Impression:      No radiographic evidence of pneumonia or other source of shortness of breath on this single view, noting that early/mild viral pneumonia or nonspecific bronchitis may be radiographically occult.      Electronically signed by: Timbo Baumann MD  Date:    04/18/2023  Time:    20:17               Narrative:    EXAMINATION:  XR CHEST AP PORTABLE    CLINICAL HISTORY:  Shortness of breath    TECHNIQUE:  Single frontal view of the chest was performed.    COMPARISON:  Chest radiograph 01/18/2023 and CT thorax 12/15/2020    FINDINGS:  Monitoring leads overlie the chest.  Patient is slightly rotated.  Large body habitus.    Cardiomediastinal silhouette is midline and stable without evidence of failure.  Pulmonary vasculature and hilar contours are within normal limits.  Bibasilar minimal platelike scarring versus atelectasis.  The lungs are otherwise well expanded without consolidation, pleural effusion or pneumothorax.  No acute osseous process seen.  PA and lateral views can be obtained.                                        Assessment/Plan:     * Nephrolithiasis  Patient w/ approx 5 days of abdominal/flank pain. UA w/ 2+ occult blood. Found to have 8mm stone at R UVJ. Urology consulted with plans for stent placement.  -NPO for stent placement  -pain control  -maintenance fluids      Hydronephrosis with urinary obstruction due to ureteral calculus  -see nephrolithiasis      HANNAH (acute kidney injury)  Patient with acute kidney injury likely due to IVVD/dehydration and post-obstructive d/t UVJ stone HANNAH is currently stable. Labs reviewed- Renal function/electrolytes with Estimated Creatinine Clearance: 23.9 mL/min (A) (based on SCr of 2.1 mg/dL (H)). according to latest data. Monitor urine output and serial BMP and adjust therapy as needed. Avoid nephrotoxins and renally dose meds for GFR listed above.   -maintenance fluids until s/p stent placement    Chronic pain  "disorder  Home meds include cymbalta and percocet  -cymbalta held at this time due to decreased renal function      PAD (peripheral artery disease)  Per vasc sx note from 7/2022-"Start cilostazol 100mg BID. Continue asa 81 mg and start Atorvastatin 40 mg for medical management of peripheral artery disease."   -does not appear to be on ASA   -stopped pletal 2/2 nausea/vomiting  -continue eliquis, consider restarting ASA      Deep vein thrombosis (DVT) of popliteal vein of both lower extremities  -continue eliquis      Obesity (BMI 30.0-34.9)        Diabetic neuropathy associated with type 2 diabetes mellitus  -holding cymbalta in setting of decreased renal function    Type 2 diabetes mellitus, with long-term current use of insulin  Patient's FSGs are controlled on current medication regimen.  Last A1c reviewed-   Lab Results   Component Value Date    HGBA1C 8.2 (H) 03/08/2023     Most recent fingerstick glucose reviewed- No results for input(s): POCTGLUCOSE in the last 24 hours.  Current correctional scale  Low  Maintain anti-hyperglycemic dose as follows-   Antihyperglycemics (From admission, onward)    Start     Stop Route Frequency Ordered    04/19/23 0900  insulin detemir U-100 (Levemir) pen 15 Units         -- SubQ 2 times daily 04/19/23 0513    04/19/23 0249  insulin aspart U-100 pen 0-5 Units         -- SubQ Every 6 hours PRN 04/19/23 0150        Hold Oral hypoglycemics while patient is in the hospital.    Hyperlipidemia  -continue statin      Coronary artery disease involving native coronary artery of native heart with angina pectoris  Not currently on ASA  -continue statin      Stage 3a chronic kidney disease  -renally dose meds  -avoid nephrotoxins      Diastolic congestive heart failure  Patient is identified as having Diastolic (HFpEF) heart failure that is Chronic. CHF is currently controlled. Latest ECHO performed and demonstrates- Results for orders placed during the hospital encounter of " 01/25/22    Echo    Interpretation Summary  · The estimated ejection fraction is 55%.  · The left ventricle is normal in size with concentric hypertrophy and normal systolic function.  · Grade I left ventricular diastolic dysfunction.  · Normal right ventricular size with normal right ventricular systolic function.  · Moderate left atrial enlargement.  · There is right ventricular hypertrophy.  · Mild tricuspid regurgitation.  · The estimated PA systolic pressure is 35 mmHg.  · Intermediate central venous pressure (8 mmHg).  . Continue Beta Blocker and monitor clinical status closely. Monitor on telemetry. Patient is off CHF pathway.  Monitor strict Is&Os and daily weights.  Place on fluid restriction of 2 L. Continue to stress to patient importance of self efficacy and  on diet for CHF. Last BNP reviewed- and noted below   Recent Labs   Lab 04/18/23 1807   BNP 55   .    Will hold lasix given HANNAH and no signs of CHF exacerbation at this time      Gastroesophageal reflux disease without esophagitis  -continue protonix      MARLON on CPAP  -CPAP qhs      Essential hypertension  -continue coreg, amlodipine      VTE Risk Mitigation (From admission, onward)         Ordered     apixaban tablet 5 mg  2 times daily         04/19/23 0147     Reason for No Pharmacological VTE Prophylaxis  Once        Question:  Reasons:  Answer:  Already adequately anticoagulated on oral Anticoagulants    04/19/23 0143     IP VTE HIGH RISK PATIENT  Once         04/19/23 0143     Place sequential compression device  Until discontinued         04/19/23 0143                       On 04/19/2023, patient should be placed in hospital observation services under my care in collaboration with Dr. Gr.    Hamilton Andrade MD  Department of Hospital Medicine  [facility] - Intensive Care ([place]-[facility])

## 2023-04-19 NOTE — ASSESSMENT & PLAN NOTE
Patient's FSGs are controlled on current medication regimen.  Last A1c reviewed-   Lab Results   Component Value Date    HGBA1C 8.2 (H) 03/08/2023     Most recent fingerstick glucose reviewed- No results for input(s): POCTGLUCOSE in the last 24 hours.  Current correctional scale  Low  Maintain anti-hyperglycemic dose as follows-   Antihyperglycemics (From admission, onward)    Start     Stop Route Frequency Ordered    04/19/23 0900  insulin detemir U-100 (Levemir) pen 15 Units         -- SubQ 2 times daily 04/19/23 0513    04/19/23 0249  insulin aspart U-100 pen 0-5 Units         -- SubQ Every 6 hours PRN 04/19/23 0150        Hold Oral hypoglycemics while patient is in the hospital.

## 2023-04-19 NOTE — ASSESSMENT & PLAN NOTE
Patient w/ approx 5 days of abdominal/flank pain. UA w/ 2+ occult blood. Found to have 8mm stone at R UVJ. Urology consulted with plans for stent placement.  -NPO for stent placement  -pain control  -maintenance fluids

## 2023-04-19 NOTE — DISCHARGE INSTRUCTIONS
Please obtain blood work on Monday and follow up with your PCP on Tuesday.     Please continue to hydrate yourself

## 2023-04-19 NOTE — ED PROVIDER NOTES
I assumed care of this patient from the outgoing physician assistant at shift change.  At that time patient was pending imaging and a repeat creatinine.  CT scan shows a 8 mm kidney stone with hydronephrosis at the right UVJ.  Patient received fluids, baseline creatinine 1.0.  Creatinine on CMP was 2.1, repeat creatinine 2.3.     In and out catheter specimen urinalysis collected was without acute findings other than a small amount of blood.  I discussed the case with Urology who will come see the patient.  Patient will be admitted to Hospital Medicine.     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

## 2023-04-19 NOTE — PROGRESS NOTES
"[facility] - [facility] ([place] [address])  Urology  Progress Note    Patient Name: Henrietta Villasenor  MRN: [id number]  Admission Date: 4/18/2023  Hospital Length of Stay: 0 days  Code Status: Full Code   Attending Provider: Chad Gr MD   Primary Care Physician: Jeannine Arriaga MD    Subjective:     HPI:  Henrietta Villasenor is a 83 y.o. female with pmh of CAD, HTN, HLD, DM, Nephrolithiasis, prior DVT presenting to the ED wth 5 day complaint of fatigue, decreased appetite, and right flank pain. Urology consulted for right 8mm UVJ stone with hydroureteronephrosis and HANNAH.    She states she has had colicky right flank pain for "quite some time", but recently developed worsening groin and abdominal pain. C/o fatigue and decreased appetite for the last 5 days. Denies any voiding difficulties. Denies hematuria, dysuria, fevers, chills. Prior history of stones with a right ESWL in 2011 with Dr. Peguero. AFVSS    Labs: Cr 2.3 baseline ~1.0, WBC 11.8, Hgb 12.6  Urine: non-concerning for infection. Nitrite negative. No bacteria  CTRSS: Right 8mm UVJ stone with hydroureteronephrosis proximal, possible right ureterocele, non-onstructing stones in the mid and lower pole right kidney, left collecting system without hydronephrosis or urolithiasis, small amount of parenchymal thinning of the right kidney compared to the left  Prior CT from 5/2022 unable to be opened in Epic at this time      Interval History: NAEON. AF. Hypertensive overnight. Pain controlled. No UOP recorded but patient reports voiding once overnight without difficulty. NPO.       Objective:     Temp:  [97.3 °F (36.3 °C)-98.7 °F (37.1 °C)] 97.3 °F (36.3 °C)  Pulse:  [58-87] 68  Resp:  [9-28] 16  SpO2:  [93 %-99 %] 94 %  BP: (128-183)/(62-84) 171/72     Body mass index is 30.41 kg/m².           Drains       Drain  Duration             Female External Urinary Catheter 04/18/23 1947 <1 day                    Physical Exam  Vitals reviewed.   Constitutional:       General: " She is not in acute distress.     Appearance: She is not diaphoretic.   HENT:      Head: Normocephalic and atraumatic.      Nose: Nose normal.   Eyes:      Conjunctiva/sclera: Conjunctivae normal.   Cardiovascular:      Rate and Rhythm: Normal rate.   Pulmonary:      Effort: Pulmonary effort is normal. No respiratory distress.   Abdominal:      General: There is no distension.   Musculoskeletal:         General: Normal range of motion.      Cervical back: Normal range of motion.   Skin:     General: Skin is dry.   Neurological:      Mental Status: She is alert.   Psychiatric:         Behavior: Behavior normal.         Thought Content: Thought content normal.       Significant Labs:    BMP:  Recent Labs   Lab 04/18/23  1807   *   K 3.6   CL 94*   CO2 28   BUN 39*   CREATININE 2.1*   CALCIUM 10.1       CBC:   Recent Labs   Lab 04/18/23 1807 04/18/23 1816   WBC 11.75  --    HGB 12.6  --    HCT 38.2 41     --        All pertinent labs results from the past 24 hours have been reviewed.    Significant Imaging:  All pertinent imaging results/findings from the past 24 hours have been reviewed.                    Assessment/Plan:     * Nephrolithiasis  -CT reveals right 8mm UVJ stone and right hydroureteronephrosis. Also presents with an HANNAH  -Consented for right ureteral stent.  -Plan for cystoscopy with right ureteral stent placement today (4/19/23).  -NPO  -Agree with admission to  for comorbidities and HANNAH  -mIVF  -If patient becomes clinically unstable, please call Urology for more urgent ureteral stent placement  -Follow up labs  -Rest of care per primary        VTE Risk Mitigation (From admission, onward)         Ordered     apixaban tablet 5 mg  2 times daily         04/19/23 0147     Reason for No Pharmacological VTE Prophylaxis  Once        Question:  Reasons:  Answer:  Already adequately anticoagulated on oral Anticoagulants    04/19/23 0143     IP VTE HIGH RISK PATIENT  Once         04/19/23 0143      Place sequential compression device  Until discontinued         04/19/23 0143                Shashi Joy MD  Urology  [facility] - Intensive Care ([place])

## 2023-04-19 NOTE — ANESTHESIA PREPROCEDURE EVALUATION
[facility]  Anesthesia Pre-Operative Evaluation         Patient Name: Henrietta Villasenor  YOB: 1940  MRN: [id number]    SUBJECTIVE:     Pre-operative evaluation for Procedure(s) (LRB):  CYSTOSCOPY, WITH URETERAL STENT INSERTION (Right)     04/19/2023    Henrietta Villasenor is a 83 y.o. female     Patient now presents for the above procedure(s).      LDA:        Peripheral IV - Single Lumen 04/18/23 1807 20 G Left Antecubital (Active)   Site Assessment Dry;No redness 04/19/23 0346   Line Status Blood return noted;Flushed;Saline locked 04/18/23 1808   Dressing Status Intact 04/19/23 0346   Dressing Intervention Integrity maintained 04/19/23 0346   Reason Not Rotated Not due 04/18/23 1808   Number of days: 0       Female External Urinary Catheter 04/18/23 1947 (Active)   Number of days: 0             Patient Active Problem List   Diagnosis    Essential hypertension    MARLON on CPAP    Nephrolithiasis    Gastroesophageal reflux disease without esophagitis    Hydradenitis    Migraines, neuralgic    Chronic back pain    Unspecified ptosis of eyelid    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    Neuralgia, neuritis, and radiculitis, unspecified    Abdominal pain, unspecified site    Dermatitis due to drugs and medicines taken internally(693.0)    Dermatochalasis    Diverticulosis of large intestine    Personal history of allergy to sulfonamides    Diastolic congestive heart failure    Knee pain    Anemia    Chronic right ear pain    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    Hearing loss, sensorineural    Stage 3a chronic kidney disease    Coronary artery disease involving native coronary artery of native heart with angina pectoris    Lactose intolerance    Allergic rhinitis    Hyperlipidemia    Aortic atherosclerosis    Spinal enthesopathy of lumbar region    LLQ abdominal pain    Diverticulitis of large intestine without perforation or abscess without bleeding     Esophageal dysphagia    Fatty liver    Painful swallowing    Left facial numbness    Neck pain on left side    Insulin dependent diabetes mellitus    Shortness of breath    Diabetic neuropathy associated with type 2 diabetes mellitus    Type 2 diabetes mellitus with stage 3 chronic kidney disease, with long-term current use of insulin    Obesity (BMI 30.0-34.9)    Low hemoglobin and low hematocrit    History of colon polyps    Type 2 diabetes mellitus with diabetic polyneuropathy, with long-term current use of insulin    Hyperparathyroidism, unspecified    Obesity, diabetes, and hypertension syndrome    Spinal stenosis of lumbar region with neurogenic claudication    Senile nuclear sclerosis    Benign paroxysmal positional vertigo due to bilateral vestibular disorder    Tortuous aorta    Ectatic aorta    Insulin long-term use    Facet arthropathy    Mood disorder    Type 2 diabetes mellitus with hyperglycemia    Hypercholesterolemia    Hypovitaminosis D    Research study patient - TACT2 EDTA Chelation Study    History of MI (myocardial infarction)    Facial nerve palsy, secondary    Gout, arthritis    Vitreomacular adhesion, right    Epiretinal membrane, left    Chronic pain    Decreased ROM of lumbar spine    Weakness    Abnormality of gait and mobility    Decreased functional activity tolerance    Chest pain    Postmenopausal    Osteopenia    Thyroid nodule    Bilateral leg edema    Neuropathic pain    Hematoma of arm, left, initial encounter    Deep vein thrombosis (DVT) of popliteal vein of both lower extremities    PAD (peripheral artery disease)    Memory loss    Venous insufficiency of both lower extremities    Chronic pain disorder    Decreased activities of daily living (ADL)    Impaired functional mobility, balance, gait, and endurance    Left leg pain    Chronic bilateral low back pain with sciatica    At risk for falls       Review of patient's  allergies indicates:   Allergen Reactions    Crestor [rosuvastatin]      Cramping in legs    Ezetimibe Diarrhea     Other reaction(s): abdominal pain, Diarrhea      Hydrocodone Itching    Lisinopril      Other reaction(s): cough      Sulfa (sulfonamide antibiotics) Itching and Rash           Sulfamethoxazole     Sulfamethoxazole-trimethoprim     Valsartan Swelling       Current Outpatient Medications:    Current Facility-Administered Medications:     acetaminophen tablet 650 mg, 650 mg, Oral, Q4H PRN, Hamilton Andrade MD    albuterol-ipratropium 2.5 mg-0.5 mg/3 mL nebulizer solution 3 mL, 3 mL, Nebulization, Q6H PRN, Hamilton Andrade MD    amLODIPine tablet 10 mg, 10 mg, Oral, Daily, Hamilton Andrade MD    apixaban tablet 5 mg, 5 mg, Oral, BID, Hamilton Andrade MD    atorvastatin tablet 40 mg, 40 mg, Oral, QHS, Hamilton Andrade MD, 40 mg at 04/19/23 0430    carvediloL tablet 25 mg, 25 mg, Oral, BID, Hamilton Andrade MD    dextrose 10% bolus 125 mL 125 mL, 12.5 g, Intravenous, PRN, Hamilton Andrade MD    dextrose 10% bolus 250 mL 250 mL, 25 g, Intravenous, PRN, Hamilton Andrade MD    dextrose 40 % gel 15,000 mg, 15 g, Oral, PRN, Hamilton Andrade MD    dextrose 40 % gel 30,000 mg, 30 g, Oral, PRN, Hamilton Andrade MD    glucagon (human recombinant) injection 1 mg, 1 mg, Intramuscular, PRN, Hamilton Andrade MD    insulin aspart U-100 pen 0-5 Units, 0-5 Units, Subcutaneous, Q6H PRN, Hamilton Andrade MD    melatonin tablet 6 mg, 6 mg, Oral, Nightly PRN, Hamilton Andrade MD    naloxone 0.4 mg/mL injection 0.02 mg, 0.02 mg, Intravenous, PRN, Hamilton Andrade MD    ondansetron disintegrating tablet 8 mg, 8 mg, Oral, Q8H PRN, Hamilton Andrade MD    oxyCODONE immediate release tablet 5 mg, 5 mg, Oral, Q6H PRN, Hamilton Andrade MD    polyethylene glycol packet 17 g, 17 g, Oral, Daily, Hamilton Andrade MD    senna-docusate 8.6-50 mg  per tablet 1 tablet, 1 tablet, Oral, BID, Hamilton Andrade MD    sodium chloride 0.9% flush 10 mL, 10 mL, Intravenous, Q12H PRN, Hamilton Andrade MD    Facility-Administered Medications Ordered in Other Encounters:     0.9%  NaCl infusion, , Intravenous, Continuous, Oscar Ravi MD    tetracaine HCl (PF) 0.5 % Drop 1 drop, 1 drop, Right Eye, On Call Procedure, Keysha Valle MD, 2 drop at 10/31/18 0825    Past Surgical History:   Procedure Laterality Date    BELPHAROPTOSIS REPAIR      s/p LAMBERTO levator repair - Dr. Dejesus    CARDIAC CATHETERIZATION      CARPAL TUNNEL RELEASE  3/13/2012    CATARACT EXTRACTION W/  INTRAOCULAR LENS IMPLANT Right 10/31/2018        CATARACT EXTRACTION W/  INTRAOCULAR LENS IMPLANT Left 03/22/2018        CHOLECYSTECTOMY      COLONOSCOPY N/A 11/16/2016    Procedure: COLONOSCOPY;  Surgeon: Chris Storey MD;  Location: [facility] ([address] [place] [address]);  Service: Endoscopy;  Laterality: N/A;    COLONOSCOPY N/A 6/14/2017    Procedure: COLONOSCOPY;  Surgeon: Chris Storey MD;  [facility]  Service: Endoscopy;  Laterality: N/A;  colonoscopy in 3 months with better bowel prep. - Split PEG prep ordered    ESOPHAGOGASTRODUODENOSCOPY N/A 11/10/2021    Procedure: EGD (ESOPHAGOGASTRODUODENOSCOPY);  Surgeon: Kuldeep Velázquez MD;  Location: [address]);  Service: Endoscopy;  Laterality: N/A;  11/4-eliquis hold ok see te -tb-fully vacc-inst mail and verbal-    EXTRACORPOREAL SHOCK WAVE LITHOTRIPSY      INJECTION OF ANESTHETIC AGENT AROUND NERVE Bilateral 6/24/2020    Procedure: BLOCK, NERVE, C4-C5-C6 MEDIAL BRANCH ok per [place] to add on;  Surgeon: Oscar Ravi MD;  Location: [facility] [place], operated by [facility];  Service: Pain Management;  Laterality: Bilateral;    INTRAOCULAR PROSTHESES INSERTION Right 10/31/2018    Procedure: INSERTION-INTRAOCULAR LENS (IOL);  Surgeon: Keysha Valle MD;  Location: [facility] [place], operated by [facility] OR;  Service: Ophthalmology;  Laterality: Right;     JOINT REPLACEMENT      mastoid tumor removal      Left mastoidectomy with residual left facial weakness.    NEPHROSTOMY      OOPHORECTOMY      PHACOEMULSIFICATION OF CATARACT Right 10/31/2018    Procedure: PHACOEMULSIFICATION-ASPIRATION-CATARACT;  Surgeon: Keysha Valle MD;  Location: [facility] OR;  Service: Ophthalmology;  Laterality: Right;    TOTAL ABDOMINAL HYSTERECTOMY  1969    TAHBSO    TOTAL KNEE ARTHROPLASTY  2012    Left    TRANSFORAMINAL EPIDURAL INJECTION OF STEROID Bilateral 2019    Procedure: INJECTION, STEROID, EPIDURAL, TRANSFORAMINAL APPROACH;  Surgeon: Oscar Ravi MD;  Location: [facility]IN [facility]GT;  Service: Pain Management;  Laterality: Bilateral;  B/L TFESI L4/L5    TRANSFORAMINAL EPIDURAL INJECTION OF STEROID Bilateral 10/2/2020    Procedure: INJECTION, STEROID, EPIDURAL, TRANSFORAMINAL APPROACH;  Surgeon: Oscar Ravi MD;  Location: [facility];  Service: Pain Management;  Laterality: Bilateral;  B/L TFESI L4/5    TRANSFORAMINAL EPIDURAL INJECTION OF STEROID Bilateral 2021    Procedure: INJECTION, STEROID, EPIDURAL, TRANSFORAMINAL APPROACH, L4-L5 clear to hold Eliquis 3 days;  Surgeon: Oscar Ravi MD;  Location: [facility];  Service: Pain Management;  Laterality: Bilateral;       Social History     Socioeconomic History    Marital status: Single    Number of children: 5   Tobacco Use    Smoking status: Former     Packs/day: 1.00     Years: 15.00     Pack years: 15.00     Types: Cigarettes     Quit date: 2000     Years since quittin.7    Smokeless tobacco: Never   Substance and Sexual Activity    Alcohol use: No    Drug use: No    Sexual activity: Never   Social History Narrative    Single with 5 children. Lives alone. Retired cook.     Social Determinants of Health     Financial Resource Strain: Low Risk     Difficulty of Paying Living Expenses: Not hard at all   Food Insecurity: Food Insecurity Present    Worried About Running Out  of Food in the Last Year: Sometimes true    Ran Out of Food in the Last Year: Never true   Transportation Needs: No Transportation Needs    Lack of Transportation (Medical): No    Lack of Transportation (Non-Medical): No   Physical Activity: Inactive    Days of Exercise per Week: 0 days    Minutes of Exercise per Session: 0 min   Housing Stability: High Risk    Unable to Pay for Housing in the Last Year: Yes    Number of Places Lived in the Last Year: 1    Unstable Housing in the Last Year: No       OBJECTIVE:     Vital Signs Range (Last 24H):  Temp:  [36.3 °C (97.3 °F)-37.1 °C (98.7 °F)]   Pulse:  [58-87]   Resp:  [9-28]   BP: (128-183)/(62-84)   SpO2:  [93 %-99 %]       Significant Labs:  Lab Results   Component Value Date    WBC 11.75 04/18/2023    HGB 12.6 04/18/2023    HCT 41 04/18/2023     04/18/2023    CHOL 169 05/19/2021    TRIG 82 05/19/2021    HDL 45 05/19/2021    ALT 12 04/18/2023    AST 19 04/18/2023     (L) 04/18/2023    K 3.6 04/18/2023    CL 94 (L) 04/18/2023    CREATININE 2.1 (H) 04/18/2023    BUN 39 (H) 04/18/2023    CO2 28 04/18/2023    TSH 1.433 04/18/2023    INR 1.1 08/16/2022    HGBA1C 8.2 (H) 03/08/2023       Diagnostic Studies: No relevant studies.    EKG:   Results for orders placed or performed during the hospital encounter of 08/16/22   EKG 12-lead    Collection Time: 08/16/22  9:57 PM    Narrative    Test Reason : R52,    Vent. Rate : 071 BPM     Atrial Rate : 071 BPM     P-R Int : 128 ms          QRS Dur : 112 ms      QT Int : 436 ms       P-R-T Axes : 065 -41 071 degrees     QTc Int : 473 ms    Normal sinus rhythm  Left axis deviation  Incomplete right bundle branch block  Minimal voltage criteria for LVH, may be normal variant ( [place] product )  Nonspecific ST and T wave abnormality  Prolonged QT  Abnormal ECG  When compared with ECG of 09-FEB-2022 10:07,  No significant change was found  Confirmed by QUINTON ENGEL, JEROME ([id number]) on 8/17/2022 1:53:23 PM    Referred  "By: [email or web address]   SELF           Confirmed By:JEROME ALCANTARA MD       2D ECHO:  TTE:  Results for orders placed or performed during the hospital encounter of 01/25/22   Echo   Result Value Ref Range    BSA 2.23 m2    TDI SEPTAL 0.03 m/s    LV LATERAL E/E' RATIO 10.50 m/s    LV SEPTAL E/E' RATIO 21.00 m/s    LA WIDTH 5.06 cm    TDI LATERAL 0.06 m/s    LVIDd 4.34 3.5 - 6.0 cm    IVS 1.29 (A) 0.6 - 1.1 cm    Posterior Wall 1.48 (A) 0.6 - 1.1 cm    LVIDs 3.03 2.1 - 4.0 cm    FS 30 28 - 44 %    LA volume 105.03 cm3    Sinus 3.58 cm    STJ 3.34 cm    Ascending aorta 3.49 cm    LV mass 231.63 g    LA size 4.60 cm    RVDD 3.36 cm    TAPSE 2.45 cm    RV S' 10.26 cm/s    Left Ventricle Relative Wall Thickness 0.68 cm    AV mean gradient 9 mmHg    AV valve area 2.46 cm2    AV Velocity Ratio 0.51     AV index (prosthetic) 0.59     MV valve area p 1/2 method 2.17 cm2    E/A ratio 0.61     Mean e' 0.05 m/s    E wave deceleration time 350.31 msec    IVRT 119.89 msec    MV "A" wave duration 18.55 msec    Pulm vein S/D ratio 1.79     LVOT diameter 2.31 cm    LVOT area 4.2 cm2    LVOT peak melinda 1.06 m/s    LVOT peak VTI 24.32 cm    Ao peak melinda 2.06 m/s    Ao VTI 41.48 cm    LVOT stroke volume 101.87 cm3    AV peak gradient 17 mmHg    E/E' ratio 14.00 m/s    MV Peak E Melinda 0.63 m/s    TR Max Melinda 2.59 m/s    MV stenosis pressure 1/2 time 101.59 ms    MV Peak A Melinda 1.04 m/s    PV Peak S Melinda 0.43 m/s    PV Peak D Melinda 0.24 m/s    LV Systolic Volume 35.73 mL    LV Systolic Volume Index 16.5 mL/m2    LV Diastolic Volume 85.08 mL    LV Diastolic Volume Index 39.39 mL/m2    LA Volume Index 48.6 mL/m2    LV Mass Index 107 g/m2    RA Major Axis 4.19 cm    Left Atrium Minor Axis 5.23 cm    Left Atrium Major Axis 5.39 cm    Triscuspid Valve Regurgitation Peak Gradient 27 mmHg    LA Volume Index (Mod) 44.1 mL/m2    LA volume (mod) 95.31 cm3    RA Width 2.97 cm    Right Atrial Pressure (from IVC) 8 mmHg    EF 55 %    TV rest pulmonary artery pressure " 35 mmHg    Narrative    · The estimated ejection fraction is 55%.  · The left ventricle is normal in size with concentric hypertrophy and   normal systolic function.  · Grade I left ventricular diastolic dysfunction.  · Normal right ventricular size with normal right ventricular systolic   function.  · Moderate left atrial enlargement.  · There is right ventricular hypertrophy.  · Mild tricuspid regurgitation.  · The estimated PA systolic pressure is 35 mmHg.  · Intermediate central venous pressure (8 mmHg).          EVERARDO:  No results found. However, due to the size of the patient record, not all encounters were searched. Please check Results Review for a complete set of results.    ASSESSMENT/PLAN:                                                                                                                04/19/2023  Henrietta Villasenor is a 83 y.o., female.      Pre-op Assessment    I have reviewed the Patient Summary Reports.     I have reviewed the Nursing Notes. I have reviewed the NPO Status.   I have reviewed the Medications.     Review of Systems  Anesthesia Hx:  Denies Family Hx of Anesthesia complications.   Denies Personal Hx of Anesthesia complications.   Social:  Non-Smoker    Hematology/Oncology:  Hematology Normal   Oncology Normal     EENT/Dental:EENT/Dental Normal   Cardiovascular:   Hypertension Past MI CAD   Angina CHF    Pulmonary:   Sleep Apnea, CPAP    Renal/:   Chronic Renal Disease, CKD    Hepatic/GI:   PUD, Hiatal Hernia, GERD    Musculoskeletal:  Musculoskeletal Normal    Neurological:   Headaches    Endocrine:  Endocrine Normal    Dermatological:  Skin Normal    Psych:   depression          Physical Exam  General: Well nourished, Cooperative, Alert and Oriented    Airway:  Mallampati: III / II  Mouth Opening: Normal  TM Distance: Normal  Tongue: Normal  Neck ROM: Normal ROM    Dental:  Periodontal disease    Chest/Lungs:  Normal Respiratory Rate    Heart:  Rate:  Normal    Abdomen:  Normal        Anesthesia Plan  Type of Anesthesia, risks & benefits discussed:    Anesthesia Type: Gen ETT  Intra-op Monitoring Plan: Standard ASA Monitors  Post Op Pain Control Plan: multimodal analgesia  Induction:  IV  Airway Plan: Direct, Post-Induction  Informed Consent: Informed consent signed with the Patient and all parties understand the risks and agree with anesthesia plan.  All questions answered.   ASA Score: 3  Day of Surgery Review of History & Physical: H&P Update referred to the surgeon/provider.    Ready For Surgery From Anesthesia Perspective.     .

## 2023-04-19 NOTE — OP NOTE
[facility]  Operative Note    Date: 04/19/2023    Pre-Op Diagnosis:   Right UVJ stone  Acute kidney injury    Patient Active Problem List    Diagnosis Date Noted    HANNAH (acute kidney injury) 04/19/2023    Hydronephrosis with urinary obstruction due to ureteral calculus 04/19/2023    At risk for falls 12/27/2022    Left leg pain 04/07/2022    Chronic bilateral low back pain with sciatica 04/07/2022    Chronic pain disorder 12/07/2021    Decreased activities of daily living (ADL) 12/07/2021    Impaired functional mobility, balance, gait, and endurance 12/07/2021    Venous insufficiency of both lower extremities 04/06/2021    Memory loss 02/10/2021    Deep vein thrombosis (DVT) of popliteal vein of both lower extremities 02/04/2021    PAD (peripheral artery disease) 02/04/2021    Hematoma of arm, left, initial encounter 12/29/2020    Bilateral leg edema 12/05/2020    Neuropathic pain 12/05/2020    Thyroid nodule 07/16/2020    Osteopenia 07/07/2020    Postmenopausal 07/02/2020    Chest pain 01/09/2020    Decreased ROM of lumbar spine 12/10/2019    Weakness 12/10/2019    Abnormality of gait and mobility 12/10/2019    Decreased functional activity tolerance 12/10/2019    Chronic pain 09/17/2019    Vitreomacular adhesion, right 06/04/2019    Epiretinal membrane, left 06/04/2019    Gout, arthritis 05/10/2019    Type 2 diabetes mellitus with hyperglycemia 11/29/2018    Hypercholesterolemia 11/29/2018    Hypovitaminosis D 11/29/2018    Research study patient - TACT2 EDTA Chelation Study 11/29/2018    History of MI (myocardial infarction) 11/29/2018    Facial nerve palsy, secondary 11/29/2018    Mood disorder 08/15/2018    Tortuous aorta 06/05/2018    Ectatic aorta 06/05/2018    Insulin long-term use 06/05/2018    Facet arthropathy 06/05/2018    Benign paroxysmal positional vertigo due to bilateral vestibular disorder 05/18/2018    Senile nuclear sclerosis 03/22/2018    Spinal stenosis of lumbar region with  neurogenic claudication 02/23/2018    Type 2 diabetes mellitus with diabetic polyneuropathy, with long-term current use of insulin 08/20/2017    Obesity, diabetes, and hypertension syndrome 08/20/2017    Hyperparathyroidism, unspecified     History of colon polyps 06/14/2017    Low hemoglobin and low hematocrit 11/16/2016    Type 2 diabetes mellitus with stage 3 chronic kidney disease, with long-term current use of insulin 08/31/2016    Obesity (BMI 30.0-34.9) 08/31/2016    Diabetic neuropathy associated with type 2 diabetes mellitus 08/26/2016    Shortness of breath     Type 2 diabetes mellitus, with long-term current use of insulin 05/11/2016    Painful swallowing 02/17/2016    Left facial numbness     Neck pain on left side     Esophageal dysphagia 09/15/2015    Fatty liver 09/15/2015    Diverticulitis of large intestine without perforation or abscess without bleeding 07/27/2015    LLQ abdominal pain 07/24/2015    Hyperlipidemia 06/19/2015    Aortic atherosclerosis 06/19/2015    Spinal enthesopathy of lumbar region 06/19/2015    Allergic rhinitis 05/05/2015    Lactose intolerance 06/03/2014    Stage 3a chronic kidney disease 05/14/2014    Coronary artery disease involving native coronary artery of native heart with angina pectoris 05/14/2014    Hearing loss, sensorineural 03/20/2014    Chronic right ear pain 03/07/2014    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 03/07/2014    Anemia 11/07/2013    Knee pain 04/09/2013    Diastolic congestive heart failure 09/12/2012    Gastroesophageal reflux disease without esophagitis 07/29/2012    Hydradenitis 07/29/2012    Migraines, neuralgic 07/29/2012    Chronic back pain 07/29/2012    Essential hypertension     MARLON on CPAP     Nephrolithiasis     Personal history of allergy to sulfonamides 09/20/2011    Abdominal pain, unspecified site 07/26/2011    Dermatitis due to drugs and medicines taken internally(693.0) 05/25/2011    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 Cystoscopy  2. Right ureteral stent placement  3. Right retrograde pyelogram  4. Fluoroscopy < 1 h    Specimen(s): none    Staff Surgeon: Domo Bates MD    Assistant Surgeon: Abdulaziz Ackerman MD; Johann Olivo MD    Anesthesia: Monitored Local Anesthesia with Sedation    Indications: Henrietta Villasenor is a 83 y.o. female with a right UVJ stone and acute kidney injury presenting for cystoscopy with right ureteral stent placement.    Findings:    Impacted right UVJ stone.  Right ureteral stent placed without complication.    Estimated Blood Loss: min    Drains:  Right 6 Romansh x 26 cm JJ ureteral stent without strings    Procedure in Detail:  After risks, benefits and possible complications of the procedure were explained, the patient elected to undergo the procedure and informed consent was obtained. All questions were answered in the ольга-operative area. The patient was transferred to the cystoscopy suite and placed on the fluoroscopy table in the supine position.  SCDs were applied and working. Time out was performed, ольга-procedural antibiotics were given. Anesthesia was administered.  After adequate anesthesia the patient was placed in dorsal lithotomy position and prepped and draped in the usual sterile fashion.     A rigid cystoscope in a 22 Fr sheath was introduced into the patients bladder per urethra. This passed easily.  The entire urethra was visualized and revealed no strictures or masses.  Cystoscopy was performed which showed the right and left ureteral orifices in the normal anatomic position.     Our attention was turned to the patient's right ureteral orifice.  A motion wire was inserted per the right ureteral orifice but would not pass past  the UVJ due to her presumed impacted right UVJ stone. The motion wire was removed and an angled glide wire buttressed by a 5 Fr open-ended ureteral catheter was advanced through the right ureteral orifice and into the proximal ureter with some difficulty. The wire was removed keeping the 5 Fr open-ended ureteral catheter in place. A right retrograde pyelogram was performed to confirm location in the collecting system. This showed a dilated and tortuous proximal ureter. A motion wire was advanced through the 5 Fr and was manipulated into the renal pelvis. The 5 Fr was then advanced into the renal pelvis and the motion wire was exchanged for an Amplatz super stiff wire in order to straighten out the proximal ureter. We then measured the ureteral length to be 26 cm. The 5 Fr was removed keeping the super stiff wire in place.    We then passed a 6 Fr x 26 cm JJ ureteral stent without strings over the wire to the level of the renal pelvis under direct vision as well as flouroscopy. The super stiff wire was removed.  A 180 degree coil was observed in the renal pelvis as well as the bladder using fluoroscopy.  A 180 degree coil was also seen using direct visualization in the bladder.     The patient tolerated the procedure well and was transferred to the recovery room in stable condition.      Disposition: The patient will be transferred back to her inpatient room under the hospital medicine service.      Abdulaziz Ackerman MD

## 2023-04-19 NOTE — ED NOTES
I-STAT Chem-8+ Results:   Value Reference Range   Sodium 137 136-145 mmol/L   Potassium  2.7 3.5-5.1 mmol/L   Chloride 97  mmol/L   Ionized Calcium 1.1 1.06-1.42 mmol/L   CO2 (measured) 29 23-29 mmol/L   Glucose 145  mg/dL   BUN 30 6-30 mg/dL   Creatinine 1.9 0.5-1.4 mg/dL   Hematocrit 33 36-54%

## 2023-04-19 NOTE — PLAN OF CARE
[facility] ([facility])  Discharge Assessment    Primary Care Provider: Jeannine Arriaga MD     Discharge Assessment (most recent)       BRIEF DISCHARGE ASSESSMENT - 04/19/23 4156          Discharge Planning    Assessment Type Discharge Planning Brief Assessment     Resource/Environmental Concerns none     Support Systems Family members     Equipment Currently Used at Home cane, straight     Current Living Arrangements home     Patient/Family Anticipates Transition to home     Patient/Family Anticipated Services at Transition none     DME Needed Upon Discharge  none     Discharge Plan A Home     Discharge Plan B Home Health                 SW met with pt at bedside to discuss discharge planning.  Pt lives in a one-story house where sometimes her grandson stays with her.  Pt uses a cane for ambulation due to a history of falling. Pt will have transportation from her granddaughter and assistance from family at discharge.  SW name and ext on whiteboard.  Will continue to follow.      Astrid Pitt LMSW  [facility]  [phone]

## 2023-04-19 NOTE — ANESTHESIA POSTPROCEDURE EVALUATION
Anesthesia Post Evaluation    Patient: Henrietta Villasenor    Procedure(s) Performed: Procedure(s) (LRB):  CYSTOSCOPY  INSERTION, STENT, URETER (Right)  PYELOGRAM, RETROGRADE (Right)    Final Anesthesia Type: general      Patient location during evaluation: PACU  Patient participation: Yes- Able to Participate  Level of consciousness: awake and alert  Post-procedure vital signs: reviewed and stable  Pain management: adequate  Airway patency: patent    PONV status at discharge: No PONV  Anesthetic complications: no      Cardiovascular status: hemodynamically stable  Respiratory status: spontaneous ventilation  Follow-up not needed.          Vitals Value Taken Time   /64 04/19/23 1731   Temp 36.4 °C (97.6 °F) 04/19/23 1730   Pulse 59 04/19/23 1732   Resp 19 04/19/23 1732   SpO2 95 % 04/19/23 1732   Vitals shown include unvalidated device data.      No case tracking events are documented in the log.      Pain/Annette Score: Pain Rating Prior to Med Admin: 10 (4/18/2023 11:40 PM)  Pain Rating Post Med Admin: 5 (4/19/2023 12:02 AM)

## 2023-04-19 NOTE — ASSESSMENT & PLAN NOTE
-CT reveals right 8mm UVJ stone and right hydroureteronephrosis. Also presents with an HANNAH  -Consented for right ureteral stent.  -Plan for cystoscopy with right ureteral stent placement today (4/19/23).  -NPO  -Agree with admission to  for comorbidities and HANNAH  -mIVF  -If patient becomes clinically unstable, please call Urology for more urgent ureteral stent placement  -Follow up labs  -Rest of care per primary

## 2023-04-19 NOTE — CONSULTS
"[facility] - Emergency Dept  Urology  Consult Note    Patient Name: Henrietta Villasenor  MRN: [id number]  Admission Date: 4/18/2023  Hospital Length of Stay: 0   Code Status: Prior   Attending Provider: Chad Gr MD   Consulting Provider: Martir Patton MD  Primary Care Physician: Jeannine Arriaga MD  Principal Problem:<principal problem not specified>    Inpatient consult to Urology  Consult performed by: Martir Patton MD  Consult ordered by: David Sosa PA-C          Subjective:     HPI:  Henrietta Villasenor is a 83 y.o. female with pmh of CAD, HTN, HLD, DM, Nephrolithiasis, DVT (on Eliquis) presenting to the ED wth 5 day complaint of fatigue, decreased appetite, and right flank pain. Urology consulted for right 8mm UVJ stone with hydroureteronephrosis and HANNAH.    She states she has had colicky right flank pain for "quite some time", but recently developed worsening groin and abdominal pain. C/o fatigue and decreased appetite for the last 5 days. Denies any voiding difficulties. Denies hematuria, dysuria, fevers, chills. Prior history of stones with a right ESWL in 2011 with Dr. Peguero. AFVSS    Labs: Cr 2.3 baseline ~1.0, WBC 11.8, Hgb 12.6  Urine: non-concerning for infection. Nitrite negative. No bacteria  CTRSS: Right 8mm UVJ stone with hydroureteronephrosis proximal, possible right ureterocele, non-onstructing stones in the mid and lower pole right kidney, left collecting system without hydronephrosis or urolithiasis, small amount of parenchymal thinning of the right kidney compared to the left  Prior CT from 5/2022 unable to be opened in Epic at this time      Past Medical History:   Diagnosis Date    Abnormality of lung 11/08/2011    Stable bandlike opacities at the lung bases, most likely representing      Anxiety     Arthritis     CAD (coronary artery disease)     Calculus of ureter 2/22/2011    CHF (congestive heart failure)     Chronic back pain 7/29/2012    Colon polyp 9/2010; 11/2010    Colon polyps " 7/29/2012    Coronary artery disease involving native coronary artery of native heart with angina pectoris     Depression     Diabetes mellitus     Diabetes mellitus type II     Diverticulitis large intestine 7/27/2015    Diverticulitis of large intestine without perforation or abscess without bleeding     Diverticulosis 09/25/2010; 11/02/2010; 11/08/2011; 7/29/2012    Duodenal disorder 08/25/2011    Duodenal erosion noted on EGD.    Duodenal ulcer, unspecified as acute or chronic, without hemorrhage, perforation, or obstruction 8/24/2011    E. coli sepsis 12/2010    Due to left ureteral stone with left nephrostomy tube - hospitalized in [place]    Esophageal dysmotility 01/24/2012    Noted on upper GI-barium swallow.    Facial weakness 1969    Left facial weakness s/p left mastoidectomy in ~ 1969.    Fatty liver 11/08/2011    Reported on CT-abdomen and in 06/2012 Gastro clinic visit note.    Gastric polyp 09/29/2010    GERD (gastroesophageal reflux disease) 7/29/2012    Hepatomegaly 11/08/2011    Reported on CT-abdomen    Herpes zoster with other nervous system complications(053.19) 2/28/2011    Hiatal hernia 06/26/2006; 09/29/2010; 08/25/2011    Noted on barium swallow 2006; noted on  EGD 2011.   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    Trouble in sleeping     Type II or unspecified type diabetes mellitus with neurological manifestations, not stated as uncontrolled(250.60)     Type II or unspecified type diabetes mellitus with peripheral circulatory disorders, not stated as uncontrolled(250.70)     Type II or unspecified type diabetes mellitus with renal manifestations, not stated as uncontrolled(250.40)        Past Surgical History:   Procedure Laterality Date    BELPHAROPTOSIS REPAIR      s/p LAMBERTO levator repair - Dr. Dejesus    CARDIAC CATHETERIZATION      CARPAL TUNNEL RELEASE  3/13/2012    CATARACT EXTRACTION W/  INTRAOCULAR LENS IMPLANT Right 10/31/2018        CATARACT EXTRACTION W/  INTRAOCULAR LENS IMPLANT Left 03/22/2018        CHOLECYSTECTOMY      COLONOSCOPY N/A 11/16/2016    Procedure: COLONOSCOPY;  Surgeon: Chris Storey MD;  Location: [facility] ([address] [place] [address]);  Service: Endoscopy;  Laterality: N/A;    COLONOSCOPY N/A 6/14/2017    Procedure: COLONOSCOPY;  Surgeon: Chris Storey MD;  [facility] ([facility]  Service: Endoscopy;  Laterality: N/A;  colonoscopy in 3 months with better bowel prep. - Split PEG prep ordered    ESOPHAGOGASTRODUODENOSCOPY N/A 11/10/2021    Procedure: EGD (ESOPHAGOGASTRODUODENOSCOPY);  Surgeon: Kuldeep Velázquez MD;  [facility]  Service: Endoscopy;  Laterality: N/A;  11/4-eliquis hold ok see te -tb-fully vacc-inst mail and verbal-    EXTRACORPOREAL SHOCK WAVE LITHOTRIPSY      INJECTION OF ANESTHETIC AGENT AROUND NERVE Bilateral 6/24/2020    Procedure: BLOCK, NERVE, C4-C5-C6 MEDIAL BRANCH ok per Talia to add on;  Surgeon: Oscar Ravi MD;  Location: [facility] [place], operated [facility];  Service: Pain Management;  Laterality: Bilateral;    INTRAOCULAR PROSTHESES INSERTION Right 10/31/2018    Procedure: INSERTION-INTRAOCULAR LENS (IOL);  Surgeon: Keysha Valle MD;  Location:  [facility] OR;  Service: Ophthalmology;  Laterality: Right;    JOINT REPLACEMENT      mastoid tumor removal  1969    Left mastoidectomy with residual left facial weakness.    NEPHROSTOMY      OOPHORECTOMY      PHACOEMULSIFICATION OF CATARACT Right 10/31/2018    Procedure: PHACOEMULSIFICATION-ASPIRATION-CATARACT;  Surgeon: Keysha Valle MD;  Location: [facility] OR;  Service: Ophthalmology;  Laterality: Right;    TOTAL ABDOMINAL HYSTERECTOMY  1969    TAHBSO    TOTAL KNEE ARTHROPLASTY  9/13/2012    Left    TRANSFORAMINAL EPIDURAL INJECTION OF STEROID Bilateral 9/17/2019    Procedure: INJECTION, STEROID, EPIDURAL, TRANSFORAMINAL APPROACH;  Surgeon: Oscar Ravi MD;  Location: [facility];  Service: Pain Management;  Laterality: Bilateral;  B/L TFESI L4/L5    TRANSFORAMINAL EPIDURAL INJECTION OF STEROID Bilateral 10/2/2020    Procedure: INJECTION, STEROID, EPIDURAL, TRANSFORAMINAL APPROACH;  Surgeon: Oscar Ravi MD;  Location: [facility];  Service: Pain Management;  Laterality: Bilateral;  B/L TFESI L4/5    TRANSFORAMINAL EPIDURAL INJECTION OF STEROID Bilateral 5/7/2021    Procedure: INJECTION, STEROID, EPIDURAL, TRANSFORAMINAL APPROACH, L4-L5 clear to hold Eliquis 3 days;  Surgeon: Oscar Ravi MD;  Location: [facility];  Service: Pain Management;  Laterality: Bilateral;       Review of patient's allergies indicates:   Allergen Reactions    Crestor [rosuvastatin]      Cramping in legs    Ezetimibe Diarrhea     Other reaction(s): abdominal pain, Diarrhea      Hydrocodone Itching    Lisinopril      Other reaction(s): cough      Sulfa (sulfonamide antibiotics) Itching and Rash           Sulfamethoxazole     Sulfamethoxazole-trimethoprim     Valsartan Swelling       Family History       Problem Relation (Age of Onset)    Alcohol abuse Brother    Breast cancer Sister, Maternal Aunt    Cancer Mother, Sister    Cirrhosis Brother    Dementia Brother, Brother    Diabetes Sister, Sister, Sister,  Sister, Brother, Other    Drug abuse Brother, Brother    Frontotemporal dementia Brother    Heart attack Father, Sister    Heart disease Father    Hypertension Mother    Liver cancer Brother    Lupus Brother, Sister    No Known Problems Daughter, Son, Daughter, Son, Son, Maternal Grandmother, Maternal Grandfather, Paternal Grandmother, Paternal Grandfather, Maternal Uncle, Paternal Aunt, Paternal Uncle    Ovarian cancer Other    Sleep apnea Sister            Tobacco Use    Smoking status: Former     Packs/day: 1.00     Years: 15.00     Pack years: 15.00     Types: Cigarettes     Quit date: 2000     Years since quittin.7    Smokeless tobacco: Never   Substance and Sexual Activity    Alcohol use: No    Drug use: No    Sexual activity: Never       Review of Systems   Constitutional:  Negative for chills and fever.   Genitourinary:  Positive for hematuria. Negative for dysuria and frequency.     Objective:     Temp:  [98.6 °F (37 °C)-98.7 °F (37.1 °C)] 98.6 °F (37 °C)  Pulse:  [58-87] 67  Resp:  [9-28] 9  SpO2:  [94 %-99 %] 95 %  BP: (128-183)/(62-84) 180/84     Body mass index is 30.41 kg/m².           Drains       Drain  Duration             Female External Urinary Catheter 23 <1 day                    Physical Exam  Constitutional:       General: She is not in acute distress.     Appearance: Normal appearance.   HENT:      Head: Normocephalic.   Eyes:      Pupils: Pupils are equal, round, and reactive to light.   Cardiovascular:      Rate and Rhythm: Normal rate.   Pulmonary:      Effort: Pulmonary effort is normal. No respiratory distress.   Chest:      Chest wall: No tenderness.   Abdominal:      General: Abdomen is flat. There is no distension.      Palpations: Abdomen is soft.      Tenderness: There is abdominal tenderness (right lower abdomen). There is right CVA tenderness. There is no left CVA tenderness.   Musculoskeletal:         General: Normal range of motion.      Cervical back:  Normal range of motion.   Neurological:      General: No focal deficit present.      Mental Status: She is alert and oriented to person, place, and time.   Psychiatric:         Mood and Affect: Mood normal.         Behavior: Behavior normal.       Significant Labs:    BMP:  Recent Labs   Lab 04/18/23  1807   *   K 3.6   CL 94*   CO2 28   BUN 39*   CREATININE 2.1*   CALCIUM 10.1       CBC:  Recent Labs   Lab 04/18/23  1807 04/18/23  1816   WBC 11.75  --    HGB 12.6  --    HCT 38.2 41     --        All pertinent labs results from the past 24 hours have been reviewed.    Significant Imaging:  CT: I have reviewed all results within the past 24 hours and my personal findings are:  pertinent findings in HPI                      Assessment and Plan:     Nephrolithiasis  -CT reveals right 8mm UVJ stone and right hydroureteronephrosis. Also presents with an HANNAH  -Consented for right ureteral stent. Board notified  -NPO  -Agree with admission to  for comorbidities and HANNAH  -mIVF  -If patient becomes clinically unstable, please call Urology for more urgent ureteral stent placement        VTE Risk Mitigation (From admission, onward)    None          Thank you for your consult.     Martir Patton MD  Urology  [facility] - Emergency Dept

## 2023-04-20 VITALS
DIASTOLIC BLOOD PRESSURE: 78 MMHG | SYSTOLIC BLOOD PRESSURE: 136 MMHG | OXYGEN SATURATION: 98 % | WEIGHT: 200 LBS | TEMPERATURE: 98 F | HEART RATE: 67 BPM | RESPIRATION RATE: 16 BRPM | BODY MASS INDEX: 30.41 KG/M2

## 2023-04-20 LAB
ALBUMIN SERPL BCP-MCNC: 2.8 G/DL (ref 3.5–5.2)
ALP SERPL-CCNC: 122 U/L (ref 55–135)
ALT SERPL W/O P-5'-P-CCNC: 20 U/L (ref 10–44)
ANION GAP SERPL CALC-SCNC: 12 MMOL/L (ref 8–16)
AST SERPL-CCNC: 27 U/L (ref 10–40)
BASOPHILS # BLD AUTO: 0.03 K/UL (ref 0–0.2)
BASOPHILS NFR BLD: 0.3 % (ref 0–1.9)
BILIRUB SERPL-MCNC: 0.4 MG/DL (ref 0.1–1)
BUN SERPL-MCNC: 28 MG/DL (ref 8–23)
CALCIUM SERPL-MCNC: 9.4 MG/DL (ref 8.7–10.5)
CHLORIDE SERPL-SCNC: 100 MMOL/L (ref 95–110)
CO2 SERPL-SCNC: 27 MMOL/L (ref 23–29)
CREAT SERPL-MCNC: 1.7 MG/DL (ref 0.5–1.4)
DIFFERENTIAL METHOD: ABNORMAL
EOSINOPHIL # BLD AUTO: 0.3 K/UL (ref 0–0.5)
EOSINOPHIL NFR BLD: 2.7 % (ref 0–8)
ERYTHROCYTE [DISTWIDTH] IN BLOOD BY AUTOMATED COUNT: 14 % (ref 11.5–14.5)
EST. GFR  (NO RACE VARIABLE): 29.6 ML/MIN/1.73 M^2
GLUCOSE SERPL-MCNC: 152 MG/DL (ref 70–110)
HCT VFR BLD AUTO: 34.4 % (ref 37–48.5)
HGB BLD-MCNC: 10.8 G/DL (ref 12–16)
IMM GRANULOCYTES # BLD AUTO: 0.05 K/UL (ref 0–0.04)
IMM GRANULOCYTES NFR BLD AUTO: 0.5 % (ref 0–0.5)
LYMPHOCYTES # BLD AUTO: 2.2 K/UL (ref 1–4.8)
LYMPHOCYTES NFR BLD: 24.1 % (ref 18–48)
MAGNESIUM SERPL-MCNC: 1.5 MG/DL (ref 1.6–2.6)
MCH RBC QN AUTO: 26.6 PG (ref 27–31)
MCHC RBC AUTO-ENTMCNC: 31.4 G/DL (ref 32–36)
MCV RBC AUTO: 85 FL (ref 82–98)
MONOCYTES # BLD AUTO: 1 K/UL (ref 0.3–1)
MONOCYTES NFR BLD: 10.4 % (ref 4–15)
NEUTROPHILS # BLD AUTO: 5.8 K/UL (ref 1.8–7.7)
NEUTROPHILS NFR BLD: 62 % (ref 38–73)
NRBC BLD-RTO: 0 /100 WBC
PHOSPHATE SERPL-MCNC: 3.6 MG/DL (ref 2.7–4.5)
PLATELET # BLD AUTO: 246 K/UL (ref 150–450)
PMV BLD AUTO: 9.6 FL (ref 9.2–12.9)
POCT GLUCOSE: 141 MG/DL (ref 70–110)
POCT GLUCOSE: 221 MG/DL (ref 70–110)
POTASSIUM SERPL-SCNC: 3.5 MMOL/L (ref 3.5–5.1)
PROT SERPL-MCNC: 7 G/DL (ref 6–8.4)
RBC # BLD AUTO: 4.06 M/UL (ref 4–5.4)
SODIUM SERPL-SCNC: 139 MMOL/L (ref 136–145)
WBC # BLD AUTO: 9.29 K/UL (ref 3.9–12.7)

## 2023-04-20 PROCEDURE — 36415 COLL VENOUS BLD VENIPUNCTURE: CPT | Mod: HCNC

## 2023-04-20 PROCEDURE — G0378 HOSPITAL OBSERVATION PER HR: HCPCS | Mod: HCNC

## 2023-04-20 PROCEDURE — 84100 ASSAY OF PHOSPHORUS: CPT | Mod: HCNC

## 2023-04-20 PROCEDURE — 99239 HOSP IP/OBS DSCHRG MGMT >30: CPT | Mod: HCNC,,, | Performed by: INTERNAL MEDICINE

## 2023-04-20 PROCEDURE — 25000003 PHARM REV CODE 250: Mod: HCNC

## 2023-04-20 PROCEDURE — 25000003 PHARM REV CODE 250: Mod: HCNC | Performed by: STUDENT IN AN ORGANIZED HEALTH CARE EDUCATION/TRAINING PROGRAM

## 2023-04-20 PROCEDURE — 83735 ASSAY OF MAGNESIUM: CPT | Mod: HCNC

## 2023-04-20 PROCEDURE — 80053 COMPREHEN METABOLIC PANEL: CPT | Mod: HCNC

## 2023-04-20 PROCEDURE — 99239 PR HOSPITAL DISCHARGE DAY,>30 MIN: ICD-10-PCS | Mod: HCNC,,, | Performed by: INTERNAL MEDICINE

## 2023-04-20 PROCEDURE — 85025 COMPLETE CBC W/AUTO DIFF WBC: CPT | Mod: HCNC

## 2023-04-20 RX ORDER — HYDRALAZINE HYDROCHLORIDE 10 MG/1
10 TABLET, FILM COATED ORAL ONCE
Status: DISCONTINUED | OUTPATIENT
Start: 2023-04-20 | End: 2023-04-20

## 2023-04-20 RX ORDER — POTASSIUM CHLORIDE 750 MG/1
10 TABLET, EXTENDED RELEASE ORAL DAILY
Qty: 90 TABLET | Refills: 1 | Status: ON HOLD
Start: 2023-04-20 | End: 2023-04-26 | Stop reason: HOSPADM

## 2023-04-20 RX ORDER — LANOLIN ALCOHOL/MO/W.PET/CERES
400 CREAM (GRAM) TOPICAL ONCE
Status: DISCONTINUED | OUTPATIENT
Start: 2023-04-20 | End: 2023-04-20

## 2023-04-20 RX ORDER — FUROSEMIDE 40 MG/1
TABLET ORAL
Qty: 90 TABLET | Refills: 0 | Status: ON HOLD | OUTPATIENT
Start: 2023-04-20 | End: 2023-04-26 | Stop reason: HOSPADM

## 2023-04-20 RX ORDER — DULOXETIN HYDROCHLORIDE 60 MG/1
60 CAPSULE, DELAYED RELEASE ORAL 2 TIMES DAILY
Qty: 180 CAPSULE | Refills: 1 | Status: SHIPPED | OUTPATIENT
Start: 2023-04-20 | End: 2024-01-19 | Stop reason: SDUPTHER

## 2023-04-20 RX ORDER — MAGNESIUM SULFATE HEPTAHYDRATE 40 MG/ML
2 INJECTION, SOLUTION INTRAVENOUS ONCE
Status: DISCONTINUED | OUTPATIENT
Start: 2023-04-20 | End: 2023-04-20

## 2023-04-20 RX ADMIN — CARVEDILOL 25 MG: 25 TABLET, FILM COATED ORAL at 09:04

## 2023-04-20 RX ADMIN — SENNOSIDES AND DOCUSATE SODIUM 1 TABLET: 50; 8.6 TABLET ORAL at 09:04

## 2023-04-20 RX ADMIN — POLYETHYLENE GLYCOL 3350 17 G: 17 POWDER, FOR SOLUTION ORAL at 09:04

## 2023-04-20 RX ADMIN — TAMSULOSIN HYDROCHLORIDE 0.4 MG: 0.4 CAPSULE ORAL at 09:04

## 2023-04-20 RX ADMIN — AMLODIPINE BESYLATE 10 MG: 10 TABLET ORAL at 09:04

## 2023-04-20 RX ADMIN — INSULIN DETEMIR 15 UNITS: 100 INJECTION, SOLUTION SUBCUTANEOUS at 09:04

## 2023-04-20 NOTE — PLAN OF CARE
CHW scheduled pcp f/u   Future Appointments   Date Time Provider Department Center   4/25/2023  2:30 PM Margaret Cabrera PA-C [facility]   4/27/2023 11:30 AM DIETITIANGENERAL INT MED [facility]   6/8/2023 12:00 PM Jeannine Arriaga MD [facility]   7/12/2023 11:00 AM Georgina Clemens OD [facility]

## 2023-04-20 NOTE — PLAN OF CARE
Patient is discharging home with family with all personal belongings. Discharge instructions given. Patient verbalized understanding.

## 2023-04-20 NOTE — DISCHARGE SUMMARY
[facility] - Intensive Care ([place] [address])  [facility] Medicine  Discharge Summary      Patient Name: Henrietta Villasenor  MRN: [id number]  JOSSELYN: [id number]  Patient Class: OP- Observation  Admission Date: 4/18/2023  Hospital Length of Stay: 0 days  Discharge Date and Time:  04/20/2023 2:45 PM  Attending Physician: iNcky Chew MD   Discharging Provider: Lulu Shook DO  Primary Care Provider: Jeannine Arriaga MD  [facility] Medicine Team: [place] [address] Lulu Shook DO  Primary Care Team: [facility] 4    HPI:   Ms. Villasenor is a 83 year old female PMHx of PAD, peripheral neuropathy, IDDM2, HTN, prior DVT on Eliquis, CKD, CAD, nephrolithiasis, cholecystectomy, diverticulitis, MARLON on CPAP who presents with abdominal/back pain present for 5 days. Patient states she started having abdominal pain on Friday. Pain is in lower abdomen and spreads into her R flank and back. Since the pain started she has not had much of an appetite. Has been staying in bed due to feeling fatigued. Also has noticed some shortness of breath which did not improve with her inhaler. Has also had some urinary frequency and urgency. Felt like she had dark colored, liquid bowel movement a few days ago but no normal bowel movement in the past few days.  In the ED, she was afebrile, vitals stable w/ hypertension. Labs showed SCr 2.1 (baseline around 1.1), BUN 39, UA 2+ occult blood. CT AP showed severe right hydroureteronephrosis w/ 8 mm stone at the ureteral vesicular junction. Urology was consulted with plans for stent placement. Admitted to  for further management.          Procedure(s) (LRB):  CYSTOSCOPY  INSERTION, STENT, URETER (Right)  PYELOGRAM, RETROGRADE (Right)      Hospital Course:   Admitted to hospital medicine for HANNAH 2/2 for nephrolithiasis s/p stent with urology on 4/19. HANNAH is improving with IVF. Pt is stable for discharge with repeat BMP and PCP follow up.     Vitals:    04/20/23 1126   BP: (!) 191/87   Pulse: 67   Resp: 16   Temp: 98.3 °F  (36.8 °C)     Physical Exam  Vitals and nursing note reviewed.   Constitutional:       General: She is not in acute distress.     Appearance: Normal appearance.   HENT:      Mouth/Throat:      Mouth: Mucous membranes are moist.      Pharynx: Oropharynx is clear.   Cardiovascular:      Rate and Rhythm: Normal rate and regular rhythm.      Pulses: Normal pulses.      Heart sounds: No murmur heard.  Pulmonary:      Effort: Pulmonary effort is normal.      Breath sounds: Normal breath sounds.   Abdominal:      General: Abdomen is flat. There is no distension.  Musculoskeletal:         General: No swelling.      Right lower leg: No edema.      Left lower leg: No edema.   Skin:     General: Skin is warm and dry.      Capillary Refill: Capillary refill takes less than 2 seconds.   Neurological:      General: No focal deficit present.      Mental Status: She is alert and oriented to person, place, and time.   Psychiatric:         Mood and Affect: Mood normal.         Behavior: Behavior normal.           Goals of Care Treatment Preferences:  Code Status: Full Code      Consults:   Consults (From admission, onward)        Status Ordering Provider     Inpatient consult to Urology  Once        Provider:  (Not yet assigned)    RIMA Guevara          No new Assessment & Plan notes have been filed under this hospital service since the last note was generated.  Service: Hospital Medicine    Final Active Diagnoses:    Diagnosis Date Noted POA    PRINCIPAL PROBLEM:  Nephrolithiasis [N20.0]  Yes    HANNAH (acute kidney injury) [N17.9] 04/19/2023 Yes    Hydronephrosis with urinary obstruction due to ureteral calculus [N13.2] 04/19/2023 Yes    Chronic pain disorder [G89.4] 12/07/2021 Yes    PAD (peripheral artery disease) [I73.9] 02/04/2021 Yes    Deep vein thrombosis (DVT) of popliteal vein of both lower extremities [I82.433] 02/04/2021 Yes    Obesity (BMI 30.0-34.9) [E66.9] 08/31/2016 Yes    Diabetic neuropathy  associated with type 2 diabetes mellitus [E11.40] 08/26/2016 Yes    Type 2 diabetes mellitus, with long-term current use of insulin [E11.9, Z79.4] 05/11/2016 Not Applicable    Hyperlipidemia [E78.5] 06/19/2015 Yes    Coronary artery disease involving native coronary artery of native heart with angina pectoris [I25.119] 05/14/2014 Yes    Stage 3a chronic kidney disease [N18.31] 05/14/2014 Yes    Diastolic congestive heart failure [I50.30] 09/12/2012 Yes    Gastroesophageal reflux disease without esophagitis [K21.9] 07/29/2012 Yes    Essential hypertension [I10]  Yes     Chronic    MARLON on CPAP [G47.33, Z99.89]  Not Applicable     Chronic      Problems Resolved During this Admission:       Discharged Condition: good    Disposition:     Follow Up:    Patient Instructions:      BASIC METABOLIC PANEL   Standing Status: Future Standing Exp. Date: 06/18/24     Ambulatory referral/consult to Urology   Standing Status: Future   Referral Priority: Routine Referral Type: Consultation   Referral Reason: Specialty Services Required   Requested Specialty: Urology   Number of Visits Requested: 1       Significant Diagnostic Studies: Labs:   BMP:   Recent Labs   Lab 04/18/23 1807 04/19/23  0535 04/20/23  0238   * 162* 152*   * 135* 139   K 3.6 4.0 3.5   CL 94* 99 100   CO2 28 21* 27   BUN 39* 33* 28*   CREATININE 2.1* 2.1* 1.7*   CALCIUM 10.1 9.5 9.4   MG 1.4* 1.5* 1.5*    and CBC   Recent Labs   Lab 04/18/23 1807 04/18/23  1816 04/19/23  0535 04/20/23  0238   WBC 11.75  --  11.36 9.29   HGB 12.6  --  11.7* 10.8*   HCT 38.2   < > 35.9* 34.4*     --  249 246    < > = values in this interval not displayed.       Pending Diagnostic Studies:     None         Medications:  Reconciled Home Medications:      Medication List      CHANGE how you take these medications    DULoxetine 60 MG capsule  Commonly known as: CYMBALTA  Take 1 capsule (60 mg total) by mouth 2 (two) times daily. Please hold until you follow  "up with your primary care provider  What changed: additional instructions     furosemide 40 MG tablet  Commonly known as: LASIX  Please hold until you follow up with your primary care provider  What changed: additional instructions     potassium chloride 10 MEQ Tbsr  Commonly known as: KLOR-CON  Take 1 tablet (10 mEq total) by mouth once daily. Please hold until you follow up with your primary care provider  What changed: additional instructions        CONTINUE taking these medications    ACCU-CHEK GUIDE TEST STRIPS Strp  Generic drug: blood sugar diagnostic  TEST BLOOD SUGAR THREE TIMES DAILY     ACCU-CHEK SOFT DEV LANCETS Kit  Generic drug: lancing device with lancets     albuterol 90 mcg/actuation inhaler  Commonly known as: PROVENTIL/VENTOLIN HFA  Inhale 2 puffs by mouth into the lungs every 4 (four) hours as needed for Wheezing. Rescue     amLODIPine 10 MG tablet  Commonly known as: NORVASC  Take 1 tablet (10 mg total) by mouth once daily.     apixaban 5 mg Tab  Commonly known as: ELIQUIS  Take 1 tablet (5 mg total) by mouth 2 (two) times daily.     atorvastatin 40 MG tablet  Commonly known as: LIPITOR  Take 1 tablet (40 mg total) by mouth once daily.     * BD ULTRA-FINE OLU PEN NEEDLE 32 gauge x 5/32" Ndle  Generic drug: pen needle, diabetic  Uses 4 times daily, on multiple daily insulin injections     * DROPLET PEN NEEDLE 29 gauge x 1/2" Ndle  Generic drug: pen needle, diabetic  USE FOUR TIMES DAILY     blood-glucose meter kit  Use as instructed     carvediloL 25 MG tablet  Commonly known as: COREG  Take 1 tablet (25 mg total) by mouth 2 (two) times daily.     cholecalciferol (vitamin D3) 125 mcg (5,000 unit) capsule  Take 5,000 Units by mouth once daily.     DROPSAFE ALCOHOL PREP PADS Padm  Generic drug: alcohol swabs  USE TOPICALLY TO TEST BLOOD SUGAR FOUR TIMES DAILY     fluticasone propionate 50 mcg/actuation nasal spray  Commonly known as: FLONASE  Instill 2 sprays in each nostril once daily.   "   insulin aspart U-100 100 unit/mL (3 mL) Inpn pen  Commonly known as: NovoLOG  Inject 24 Units into the skin before breakfast AND 28 Units daily with lunch AND 28 Units before dinner.     lancets Misc  Commonly known as: ACCU-CHEK SOFTCLIX LANCETS  TEST BLOOD SUGAR FOUR TIMES DAILY     LANTUS SOLOSTAR U-100 INSULIN glargine 100 units/mL SubQ pen  Generic drug: insulin  Inject 60 Units into the skin once daily.     loratadine 10 mg tablet  Commonly known as: CLARITIN  Take 1 tablet (10 mg total) by mouth once daily.     olopatadine 0.2 % Drop  Commonly known as: PATADAY  Place 1 drop into both eyes once daily.     * oxyCODONE-acetaminophen  mg per tablet  Commonly known as: PERCOCET  Take 1 tablet by mouth 5 (five) times daily.     * oxyCODONE-acetaminophen  mg per tablet  Commonly known as: PERCOCET  Take 1 tablet by mouth 5 (five) times daily.     * oxyCODONE-acetaminophen  mg per tablet  Commonly known as: PERCOCET  Take 1 tablet by mouth 5 (five) times daily.  Start taking on: May 5, 2023     pantoprazole 40 MG tablet  Commonly known as: PROTONIX  Take 1 tablet (40 mg total) by mouth once daily.     rOPINIRole 0.5 MG tablet  Commonly known as: REQUIP  Take 1 tablet (0.5 mg total) by mouth every evening.     triamcinolone acetonide 0.1% 0.1 % cream  Commonly known as: KENALOG  Apply topically 2 (two) times daily.         * This list has 5 medication(s) that are the same as other medications prescribed for you. Read the directions carefully, and ask your doctor or other care provider to review them with you.                Indwelling Lines/Drains at time of discharge:   Lines/Drains/Airways     Drain  Duration           Female External Urinary Catheter 04/19/23 1800 <1 day                Time spent on the discharge of patient: 45 minutes         Lulu Shook DO  Department of Hospital Medicine  [facility] - Intensive Care ([place]-[facility])

## 2023-04-20 NOTE — PROGRESS NOTES
"[facility] - Intensive Care ([place] [address])  Urology  Progress Note    Patient Name: Henrietta Villasenor  MRN: [id number]  Admission Date: 4/18/2023  Hospital Length of Stay: 0 days  Code Status: Full Code   Attending Provider: Nicky Chew MD   Primary Care Physician: Jeannine Arriaga MD    Subjective:     HPI:  Henrietta Villasenor is a 83 y.o. female with pmh of CAD, HTN, HLD, DM, Nephrolithiasis, prior DVT presenting to the ED wth 5 day complaint of fatigue, decreased appetite, and right flank pain. Urology consulted for right 8mm UVJ stone with hydroureteronephrosis and HANNAH.    She states she has had colicky right flank pain for "quite some time", but recently developed worsening groin and abdominal pain. C/o fatigue and decreased appetite for the last 5 days. Denies any voiding difficulties. Denies hematuria, dysuria, fevers, chills. Prior history of stones with a right ESWL in 2011 with Dr. Peguero. AFVSS    Labs: Cr 2.3 baseline ~1.0, WBC 11.8, Hgb 12.6  Urine: non-concerning for infection. Nitrite negative. No bacteria  CTRSS: Right 8mm UVJ stone with hydroureteronephrosis proximal, possible right ureterocele, non-onstructing stones in the mid and lower pole right kidney, left collecting system without hydronephrosis or urolithiasis, small amount of parenchymal thinning of the right kidney compared to the left  Prior CT from 5/2022 unable to be opened in Epic at this time      Interval History: NAEO, AFVSS. Cr 1.7 from 2.1. Pain controlled.     Objective:     Temp:  [97.3 °F (36.3 °C)-98.5 °F (36.9 °C)] 98.3 °F (36.8 °C)  Pulse:  [57-68] 68  Resp:  [14-18] 16  SpO2:  [92 %-96 %] 95 %  BP: (130-174)/(63-79) 150/70     Body mass index is 30.41 kg/m².           Drains       Drain  Duration             Female External Urinary Catheter 04/19/23 1800 <1 day                    Physical Exam  Constitutional:       General: She is not in acute distress.     Appearance: Normal appearance. She is not ill-appearing.   HENT:      " Head: Normocephalic and atraumatic.      Mouth/Throat:      Mouth: Mucous membranes are moist.   Eyes:      Extraocular Movements: Extraocular movements intact.   Cardiovascular:      Rate and Rhythm: Normal rate.   Pulmonary:      Effort: Pulmonary effort is normal.   Abdominal:      Palpations: Abdomen is soft.   Skin:     General: Skin is warm and dry.   Neurological:      Mental Status: She is alert and oriented to person, place, and time.       Significant Labs:    BMP:  Recent Labs   Lab 04/18/23 1807 04/19/23  0535 04/20/23  0238   * 135* 139   K 3.6 4.0 3.5   CL 94* 99 100   CO2 28 21* 27   BUN 39* 33* 28*   CREATININE 2.1* 2.1* 1.7*   CALCIUM 10.1 9.5 9.4       CBC:   Recent Labs   Lab 04/18/23 1807 04/18/23  1816 04/18/23  2333 04/19/23  0535 04/20/23  0238   WBC 11.75  --   --  11.36 9.29   HGB 12.6  --   --  11.7* 10.8*   HCT 38.2   < > 33* 35.9* 34.4*     --   --  249 246    < > = values in this interval not displayed.       Urine Studies:   Recent Labs   Lab 04/18/23 2334   COLORU Yellow   APPEARANCEUA Clear   PHUR 6.0   SPECGRAV 1.010   PROTEINUA Negative   GLUCUA Negative   KETONESU Negative   BILIRUBINUA Negative   OCCULTUA 2+*   NITRITE Negative   LEUKOCYTESUR Negative   RBCUA 3   WBCUA 3     All pertinent labs results from the past 24 hours have been reviewed.    Significant Imaging:  All pertinent imaging results/findings from the past 24 hours have been reviewed.                    Assessment/Plan:     * Nephrolithiasis  -s/p right ureteral stent placement for 8mm UVJ stone and right hydroureteronephrosis, as well as HANNAH  -Cr improving, now 1.7 from 2.1  -Will arrange outpatient follow up for ureteroscopy  -Rest of care per primary  -Urology will sign off at this time        VTE Risk Mitigation (From admission, onward)         Ordered     Reason for No Pharmacological VTE Prophylaxis  Once        Question:  Reasons:  Answer:  Already adequately anticoagulated on oral  Anticoagulants    04/19/23 0143     IP VTE HIGH RISK PATIENT  Once         04/19/23 0143     Place sequential compression device  Until discontinued         04/19/23 0143                Carl Powell MD  Urology  [facility] - Intensive Care ([facility]-)

## 2023-04-20 NOTE — SUBJECTIVE & OBJECTIVE
Interval History: NAEO, AFVSS. Cr 1.7 from 2.1. Pain controlled.     Objective:     Temp:  [97.3 °F (36.3 °C)-98.5 °F (36.9 °C)] 98.3 °F (36.8 °C)  Pulse:  [57-68] 68  Resp:  [14-18] 16  SpO2:  [92 %-96 %] 95 %  BP: (130-174)/(63-79) 150/70     Body mass index is 30.41 kg/m².           Drains       Drain  Duration             Female External Urinary Catheter 04/19/23 1800 <1 day                    Physical Exam  Constitutional:       General: She is not in acute distress.     Appearance: Normal appearance. She is not ill-appearing.   HENT:      Head: Normocephalic and atraumatic.      Mouth/Throat:      Mouth: Mucous membranes are moist.   Eyes:      Extraocular Movements: Extraocular movements intact.   Cardiovascular:      Rate and Rhythm: Normal rate.   Pulmonary:      Effort: Pulmonary effort is normal.   Abdominal:      Palpations: Abdomen is soft.   Skin:     General: Skin is warm and dry.   Neurological:      Mental Status: She is alert and oriented to person, place, and time.       Significant Labs:    BMP:  Recent Labs   Lab 04/18/23  1807 04/19/23  0535 04/20/23  0238   * 135* 139   K 3.6 4.0 3.5   CL 94* 99 100   CO2 28 21* 27   BUN 39* 33* 28*   CREATININE 2.1* 2.1* 1.7*   CALCIUM 10.1 9.5 9.4       CBC:   Recent Labs   Lab 04/18/23  1807 04/18/23  1816 04/18/23  2333 04/19/23  0535 04/20/23  0238   WBC 11.75  --   --  11.36 9.29   HGB 12.6  --   --  11.7* 10.8*   HCT 38.2   < > 33* 35.9* 34.4*     --   --  249 246    < > = values in this interval not displayed.       Urine Studies:   Recent Labs   Lab 04/18/23  2334   COLORU Yellow   APPEARANCEUA Clear   PHUR 6.0   SPECGRAV 1.010   PROTEINUA Negative   GLUCUA Negative   KETONESU Negative   BILIRUBINUA Negative   OCCULTUA 2+*   NITRITE Negative   LEUKOCYTESUR Negative   RBCUA 3   WBCUA 3     All pertinent labs results from the past 24 hours have been reviewed.    Significant Imaging:  All pertinent imaging results/findings from the past 24  hours have been reviewed.

## 2023-04-20 NOTE — HOSPITAL COURSE
Admitted to hospital medicine for HANNAH 2/2 for nephrolithiasis s/p stent with urology on 4/19. HANNAH is improving with IVF. Pt is stable for discharge with repeat BMP and PCP follow up.     Vitals:    04/20/23 1126   BP: (!) 191/87   Pulse: 67   Resp: 16   Temp: 98.3 °F (36.8 °C)     Physical Exam  Vitals and nursing note reviewed.   Constitutional:       General: She is not in acute distress.     Appearance: Normal appearance.   HENT:      Mouth/Throat:      Mouth: Mucous membranes are moist.      Pharynx: Oropharynx is clear.   Cardiovascular:      Rate and Rhythm: Normal rate and regular rhythm.      Pulses: Normal pulses.      Heart sounds: No murmur heard.  Pulmonary:      Effort: Pulmonary effort is normal.      Breath sounds: Normal breath sounds.   Abdominal:      General: Abdomen is flat. There is no distension.  Musculoskeletal:         General: No swelling.      Right lower leg: No edema.      Left lower leg: No edema.   Skin:     General: Skin is warm and dry.      Capillary Refill: Capillary refill takes less than 2 seconds.   Neurological:      General: No focal deficit present.      Mental Status: She is alert and oriented to person, place, and time.   Psychiatric:         Mood and Affect: Mood normal.         Behavior: Behavior normal.

## 2023-04-20 NOTE — PLAN OF CARE
[facility] - Intensive Care ([facility])  Discharge Final Note    Primary Care Provider: Jeannine Arriaga MD    Expected Discharge Date: 4/20/2023    Final Discharge Note (most recent)       Final Note - 04/20/23 1218          Final Note    Assessment Type Final Discharge Note     Anticipated Discharge Disposition Home or Self Care     Hospital Resources/Appts/Education Provided Appointments scheduled and added to AVS                   Astrid Pitt LMSW [facility]  [id number]      Future Appointments   Date Time Provider Department Center   4/25/2023  2:30 PM Margaret Cabrera PA-C [facility]   4/27/2023 11:30 AM DIETITIAN, GENERAL INT MED [facility]   6/8/2023 12:00 PM Jeannine Arriaga MD [facility]   7/12/2023 11:00 AM Georgina Clemens, OD [facility]

## 2023-04-21 ENCOUNTER — TELEPHONE (OUTPATIENT)
Dept: UROLOGY | Facility: CLINIC | Age: 83
End: 2023-04-21
Payer: MEDICARE

## 2023-04-21 NOTE — TELEPHONE ENCOUNTER
----- Message from Carl Powell MD sent at 4/20/2023  6:49 AM CDT -----  Please set this patient up for follow up in 1-2 weeks to discuss stone management.

## 2023-04-21 NOTE — PLAN OF CARE
[facility] - Intensive Care ([facility]-16)  Discharge Final Note    Primary Care Provider: Jeannine Arriaga MD    Expected Discharge Date: 4/20/2023    Final Discharge Note (most recent)       Final Note - 04/21/23 1630          Final Note    Assessment Type Final Discharge Note (P)      Anticipated Discharge Disposition Home-Health Care Svc (P)         Post-Acute Status    Discharge Delays None known at this time (P)                  Pt d/c'd to .    La Reilly, CANDIEN, BS, RN, CCM        Important Message from Medicare

## 2023-04-25 ENCOUNTER — HOSPITAL ENCOUNTER (OUTPATIENT)
Facility: HOSPITAL | Age: 83
Discharge: HOME | End: 2023-04-26
Attending: EMERGENCY MEDICINE | Admitting: EMERGENCY MEDICINE
Payer: MEDICARE

## 2023-04-25 ENCOUNTER — OFFICE VISIT (OUTPATIENT)
Dept: INTERNAL MEDICINE | Facility: CLINIC | Age: 83
End: 2023-04-25
Payer: MEDICARE

## 2023-04-25 VITALS
OXYGEN SATURATION: 98 % | WEIGHT: 200.81 LBS | SYSTOLIC BLOOD PRESSURE: 142 MMHG | HEIGHT: 68 IN | HEART RATE: 83 BPM | BODY MASS INDEX: 30.44 KG/M2 | DIASTOLIC BLOOD PRESSURE: 60 MMHG

## 2023-04-25 DIAGNOSIS — E11.42 TYPE 2 DIABETES MELLITUS WITH DIABETIC POLYNEUROPATHY, WITH LONG-TERM CURRENT USE OF INSULIN: ICD-10-CM

## 2023-04-25 DIAGNOSIS — R55 SYNCOPE AND COLLAPSE: ICD-10-CM

## 2023-04-25 DIAGNOSIS — E78.00 HYPERCHOLESTEROLEMIA: ICD-10-CM

## 2023-04-25 DIAGNOSIS — Z79.4 TYPE 2 DIABETES MELLITUS WITH STAGE 3A CHRONIC KIDNEY DISEASE, WITH LONG-TERM CURRENT USE OF INSULIN: ICD-10-CM

## 2023-04-25 DIAGNOSIS — R07.9 CHEST PAIN: ICD-10-CM

## 2023-04-25 DIAGNOSIS — E11.22 TYPE 2 DIABETES MELLITUS WITH STAGE 3A CHRONIC KIDNEY DISEASE, WITH LONG-TERM CURRENT USE OF INSULIN: ICD-10-CM

## 2023-04-25 DIAGNOSIS — I10 ESSENTIAL HYPERTENSION: Chronic | ICD-10-CM

## 2023-04-25 DIAGNOSIS — I50.32 CHRONIC DIASTOLIC CONGESTIVE HEART FAILURE: ICD-10-CM

## 2023-04-25 DIAGNOSIS — G47.33 OSA ON CPAP: Chronic | ICD-10-CM

## 2023-04-25 DIAGNOSIS — N17.9 AKI (ACUTE KIDNEY INJURY): Primary | ICD-10-CM

## 2023-04-25 DIAGNOSIS — E16.2 HYPOGLYCEMIA: ICD-10-CM

## 2023-04-25 DIAGNOSIS — E16.2 HYPOGLYCEMIA: Primary | ICD-10-CM

## 2023-04-25 DIAGNOSIS — N18.31 TYPE 2 DIABETES MELLITUS WITH STAGE 3A CHRONIC KIDNEY DISEASE, WITH LONG-TERM CURRENT USE OF INSULIN: ICD-10-CM

## 2023-04-25 DIAGNOSIS — Z79.4 TYPE 2 DIABETES MELLITUS WITH DIABETIC POLYNEUROPATHY, WITH LONG-TERM CURRENT USE OF INSULIN: ICD-10-CM

## 2023-04-25 DIAGNOSIS — I25.119 CORONARY ARTERY DISEASE INVOLVING NATIVE CORONARY ARTERY OF NATIVE HEART WITH ANGINA PECTORIS: ICD-10-CM

## 2023-04-25 DIAGNOSIS — R07.9 CHEST PAIN, UNSPECIFIED TYPE: ICD-10-CM

## 2023-04-25 LAB
ALBUMIN SERPL BCP-MCNC: 3.1 G/DL (ref 3.5–5.2)
ALP SERPL-CCNC: 91 U/L (ref 55–135)
ALT SERPL W/O P-5'-P-CCNC: 11 U/L (ref 10–44)
ANION GAP SERPL CALC-SCNC: 10 MMOL/L (ref 8–16)
AST SERPL-CCNC: 20 U/L (ref 10–40)
BASOPHILS # BLD AUTO: 0.08 K/UL (ref 0–0.2)
BASOPHILS NFR BLD: 0.7 % (ref 0–1.9)
BILIRUB SERPL-MCNC: 0.4 MG/DL (ref 0.1–1)
BILIRUB UR QL STRIP: NEGATIVE
BUN SERPL-MCNC: 14 MG/DL (ref 8–23)
BUN SERPL-MCNC: 20 MG/DL (ref 6–30)
CALCIUM SERPL-MCNC: 9.4 MG/DL (ref 8.7–10.5)
CHLORIDE SERPL-SCNC: 104 MMOL/L (ref 95–110)
CHLORIDE SERPL-SCNC: 107 MMOL/L (ref 95–110)
CLARITY UR REFRACT.AUTO: CLEAR
CO2 SERPL-SCNC: 25 MMOL/L (ref 23–29)
COLOR UR AUTO: YELLOW
CREAT SERPL-MCNC: 1 MG/DL (ref 0.5–1.4)
CREAT SERPL-MCNC: 1.2 MG/DL (ref 0.5–1.4)
DIFFERENTIAL METHOD: ABNORMAL
EOSINOPHIL # BLD AUTO: 0.2 K/UL (ref 0–0.5)
EOSINOPHIL NFR BLD: 2 % (ref 0–8)
ERYTHROCYTE [DISTWIDTH] IN BLOOD BY AUTOMATED COUNT: 14.6 % (ref 11.5–14.5)
EST. GFR  (NO RACE VARIABLE): 55.9 ML/MIN/1.73 M^2
GLUCOSE SERPL-MCNC: 57 MG/DL (ref 70–110)
GLUCOSE SERPL-MCNC: 62 MG/DL (ref 70–110)
GLUCOSE SERPL-MCNC: 75 MG/DL (ref 70–110)
GLUCOSE UR QL STRIP: NEGATIVE
HCT VFR BLD AUTO: 41.5 % (ref 37–48.5)
HCT VFR BLD CALC: 48 %PCV (ref 36–54)
HGB BLD-MCNC: 12.8 G/DL (ref 12–16)
HGB UR QL STRIP: NEGATIVE
IMM GRANULOCYTES # BLD AUTO: 0.07 K/UL (ref 0–0.04)
IMM GRANULOCYTES NFR BLD AUTO: 0.6 % (ref 0–0.5)
KETONES UR QL STRIP: NEGATIVE
LEUKOCYTE ESTERASE UR QL STRIP: NEGATIVE
LIPASE SERPL-CCNC: 29 U/L (ref 4–60)
LYMPHOCYTES # BLD AUTO: 2.9 K/UL (ref 1–4.8)
LYMPHOCYTES NFR BLD: 24.9 % (ref 18–48)
MCH RBC QN AUTO: 26.9 PG (ref 27–31)
MCHC RBC AUTO-ENTMCNC: 30.8 G/DL (ref 32–36)
MCV RBC AUTO: 87 FL (ref 82–98)
MONOCYTES # BLD AUTO: 0.8 K/UL (ref 0.3–1)
MONOCYTES NFR BLD: 7.1 % (ref 4–15)
NEUTROPHILS # BLD AUTO: 7.6 K/UL (ref 1.8–7.7)
NEUTROPHILS NFR BLD: 64.7 % (ref 38–73)
NITRITE UR QL STRIP: NEGATIVE
NRBC BLD-RTO: 0 /100 WBC
PH UR STRIP: 6 [PH] (ref 5–8)
PLATELET # BLD AUTO: 296 K/UL (ref 150–450)
PMV BLD AUTO: 10 FL (ref 9.2–12.9)
POC CARDIAC TROPONIN I: 0 NG/ML (ref 0–0.08)
POC IONIZED CALCIUM: 1.19 MMOL/L (ref 1.06–1.42)
POC TCO2 (MEASURED): 27 MMOL/L (ref 23–29)
POCT GLUCOSE: 108 MG/DL (ref 70–110)
POCT GLUCOSE: 187 MG/DL (ref 70–110)
POCT GLUCOSE: 57 MG/DL (ref 70–110)
POCT GLUCOSE: 65 MG/DL (ref 70–110)
POCT GLUCOSE: 84 MG/DL (ref 70–110)
POCT GLUCOSE: 96 MG/DL (ref 70–110)
POCT GLUCOSE: 99 MG/DL (ref 70–110)
POTASSIUM BLD-SCNC: 3.7 MMOL/L (ref 3.5–5.1)
POTASSIUM SERPL-SCNC: 3.3 MMOL/L (ref 3.5–5.1)
PROCALCITONIN SERPL IA-MCNC: <0.02 NG/ML
PROT SERPL-MCNC: 7.2 G/DL (ref 6–8.4)
PROT UR QL STRIP: NEGATIVE
RBC # BLD AUTO: 4.76 M/UL (ref 4–5.4)
SAMPLE: NORMAL
SAMPLE: NORMAL
SODIUM BLD-SCNC: 142 MMOL/L (ref 136–145)
SODIUM SERPL-SCNC: 142 MMOL/L (ref 136–145)
SP GR UR STRIP: 1.01 (ref 1–1.03)
URN SPEC COLLECT METH UR: NORMAL
WBC # BLD AUTO: 11.81 K/UL (ref 3.9–12.7)

## 2023-04-25 PROCEDURE — 3077F PR MOST RECENT SYSTOLIC BLOOD PRESSURE >= 140 MM HG: ICD-10-PCS | Mod: HCNC,CPTII,S$GLB, | Performed by: PHYSICIAN ASSISTANT

## 2023-04-25 PROCEDURE — 1101F PT FALLS ASSESS-DOCD LE1/YR: CPT | Mod: HCNC,CPTII,S$GLB, | Performed by: PHYSICIAN ASSISTANT

## 2023-04-25 PROCEDURE — 85025 COMPLETE CBC W/AUTO DIFF WBC: CPT | Mod: HCNC | Performed by: EMERGENCY MEDICINE

## 2023-04-25 PROCEDURE — 96365 THER/PROPH/DIAG IV INF INIT: CPT | Mod: HCNC

## 2023-04-25 PROCEDURE — 83690 ASSAY OF LIPASE: CPT | Mod: HCNC | Performed by: EMERGENCY MEDICINE

## 2023-04-25 PROCEDURE — 82308 ASSAY OF CALCITONIN: CPT

## 2023-04-25 PROCEDURE — 3078F PR MOST RECENT DIASTOLIC BLOOD PRESSURE < 80 MM HG: ICD-10-PCS | Mod: HCNC,CPTII,S$GLB, | Performed by: PHYSICIAN ASSISTANT

## 2023-04-25 PROCEDURE — 1101F PR PT FALLS ASSESS DOC 0-1 FALLS W/OUT INJ PAST YR: ICD-10-PCS | Mod: HCNC,CPTII,S$GLB, | Performed by: PHYSICIAN ASSISTANT

## 2023-04-25 PROCEDURE — 80053 COMPREHEN METABOLIC PANEL: CPT | Mod: HCNC | Performed by: EMERGENCY MEDICINE

## 2023-04-25 PROCEDURE — 99215 PR OFFICE/OUTPT VISIT, EST, LEVL V, 40-54 MIN: ICD-10-PCS | Mod: HCNC,S$GLB,, | Performed by: PHYSICIAN ASSISTANT

## 2023-04-25 PROCEDURE — 82962 POCT GLUCOSE, HAND-HELD DEVICE: ICD-10-PCS | Mod: HCNC,S$GLB,, | Performed by: PHYSICIAN ASSISTANT

## 2023-04-25 PROCEDURE — 99291 CRITICAL CARE FIRST HOUR: CPT | Mod: HCNC,,, | Performed by: EMERGENCY MEDICINE

## 2023-04-25 PROCEDURE — 3288F FALL RISK ASSESSMENT DOCD: CPT | Mod: HCNC,CPTII,S$GLB, | Performed by: PHYSICIAN ASSISTANT

## 2023-04-25 PROCEDURE — 82962 GLUCOSE BLOOD TEST: CPT | Mod: HCNC,91

## 2023-04-25 PROCEDURE — 99291 PR CRITICAL CARE, E/M 30-74 MINUTES: ICD-10-PCS | Mod: HCNC,,, | Performed by: EMERGENCY MEDICINE

## 2023-04-25 PROCEDURE — 84145 PROCALCITONIN (PCT): CPT | Mod: HCNC | Performed by: FAMILY MEDICINE

## 2023-04-25 PROCEDURE — 99285 EMERGENCY DEPT VISIT HI MDM: CPT | Mod: 25,HCNC

## 2023-04-25 PROCEDURE — 1125F PR PAIN SEVERITY QUANTIFIED, PAIN PRESENT: ICD-10-PCS | Mod: HCNC,CPTII,S$GLB, | Performed by: PHYSICIAN ASSISTANT

## 2023-04-25 PROCEDURE — 82962 GLUCOSE BLOOD TEST: CPT | Mod: HCNC,S$GLB,, | Performed by: PHYSICIAN ASSISTANT

## 2023-04-25 PROCEDURE — 1125F AMNT PAIN NOTED PAIN PRSNT: CPT | Mod: HCNC,CPTII,S$GLB, | Performed by: PHYSICIAN ASSISTANT

## 2023-04-25 PROCEDURE — 81003 URINALYSIS AUTO W/O SCOPE: CPT | Mod: HCNC | Performed by: EMERGENCY MEDICINE

## 2023-04-25 PROCEDURE — 80047 BASIC METABLC PNL IONIZED CA: CPT | Mod: HCNC

## 2023-04-25 PROCEDURE — 99999 PR PBB SHADOW E&M-EST. PATIENT-LVL V: CPT | Mod: PBBFAC,HCNC,, | Performed by: PHYSICIAN ASSISTANT

## 2023-04-25 PROCEDURE — 99999 PR PBB SHADOW E&M-EST. PATIENT-LVL V: ICD-10-PCS | Mod: PBBFAC,HCNC,, | Performed by: PHYSICIAN ASSISTANT

## 2023-04-25 PROCEDURE — 3078F DIAST BP <80 MM HG: CPT | Mod: HCNC,CPTII,S$GLB, | Performed by: PHYSICIAN ASSISTANT

## 2023-04-25 PROCEDURE — 96366 THER/PROPH/DIAG IV INF ADDON: CPT | Mod: HCNC

## 2023-04-25 PROCEDURE — 99215 OFFICE O/P EST HI 40 MIN: CPT | Mod: HCNC,S$GLB,, | Performed by: PHYSICIAN ASSISTANT

## 2023-04-25 PROCEDURE — 25000003 PHARM REV CODE 250: Mod: HCNC | Performed by: EMERGENCY MEDICINE

## 2023-04-25 PROCEDURE — 3288F PR FALLS RISK ASSESSMENT DOCUMENTED: ICD-10-PCS | Mod: HCNC,CPTII,S$GLB, | Performed by: PHYSICIAN ASSISTANT

## 2023-04-25 PROCEDURE — 3077F SYST BP >= 140 MM HG: CPT | Mod: HCNC,CPTII,S$GLB, | Performed by: PHYSICIAN ASSISTANT

## 2023-04-25 PROCEDURE — G0378 HOSPITAL OBSERVATION PER HR: HCPCS | Mod: HCNC

## 2023-04-25 PROCEDURE — 25000003 PHARM REV CODE 250: Mod: HCNC | Performed by: FAMILY MEDICINE

## 2023-04-25 RX ORDER — AMLODIPINE BESYLATE 10 MG/1
10 TABLET ORAL DAILY
Status: DISCONTINUED | OUTPATIENT
Start: 2023-04-26 | End: 2023-04-26 | Stop reason: HOSPADM

## 2023-04-25 RX ORDER — SODIUM CHLORIDE 0.9 % (FLUSH) 0.9 %
10 SYRINGE (ML) INJECTION EVERY 12 HOURS PRN
Status: DISCONTINUED | OUTPATIENT
Start: 2023-04-25 | End: 2023-04-26 | Stop reason: HOSPADM

## 2023-04-25 RX ORDER — MAG HYDROX/ALUMINUM HYD/SIMETH 200-200-20
30 SUSPENSION, ORAL (FINAL DOSE FORM) ORAL 4 TIMES DAILY PRN
Status: DISCONTINUED | OUTPATIENT
Start: 2023-04-25 | End: 2023-04-26 | Stop reason: HOSPADM

## 2023-04-25 RX ORDER — GLUCAGON 1 MG
1 KIT INJECTION
Status: DISCONTINUED | OUTPATIENT
Start: 2023-04-25 | End: 2023-04-26 | Stop reason: HOSPADM

## 2023-04-25 RX ORDER — CARVEDILOL 25 MG/1
25 TABLET ORAL 2 TIMES DAILY
Status: DISCONTINUED | OUTPATIENT
Start: 2023-04-25 | End: 2023-04-26 | Stop reason: HOSPADM

## 2023-04-25 RX ORDER — NALOXONE HCL 0.4 MG/ML
0.02 VIAL (ML) INJECTION
Status: DISCONTINUED | OUTPATIENT
Start: 2023-04-25 | End: 2023-04-26 | Stop reason: HOSPADM

## 2023-04-25 RX ORDER — OXYCODONE HYDROCHLORIDE 10 MG/1
10 TABLET ORAL EVERY 6 HOURS PRN
Status: DISCONTINUED | OUTPATIENT
Start: 2023-04-25 | End: 2023-04-26 | Stop reason: HOSPADM

## 2023-04-25 RX ORDER — DEXTROSE 40 %
15 GEL (GRAM) ORAL
Status: DISCONTINUED | OUTPATIENT
Start: 2023-04-25 | End: 2023-04-26 | Stop reason: HOSPADM

## 2023-04-25 RX ORDER — ALBUTEROL SULFATE 90 UG/1
2 AEROSOL, METERED RESPIRATORY (INHALATION) EVERY 6 HOURS PRN
Status: DISCONTINUED | OUTPATIENT
Start: 2023-04-25 | End: 2023-04-26 | Stop reason: HOSPADM

## 2023-04-25 RX ORDER — IBUPROFEN 200 MG
24 TABLET ORAL
Status: DISCONTINUED | OUTPATIENT
Start: 2023-04-25 | End: 2023-04-25

## 2023-04-25 RX ORDER — ROPINIROLE 0.5 MG/1
0.5 TABLET, FILM COATED ORAL NIGHTLY
Status: DISCONTINUED | OUTPATIENT
Start: 2023-04-25 | End: 2023-04-26 | Stop reason: HOSPADM

## 2023-04-25 RX ORDER — ATORVASTATIN CALCIUM 40 MG/1
40 TABLET, FILM COATED ORAL DAILY
Status: DISCONTINUED | OUTPATIENT
Start: 2023-04-26 | End: 2023-04-26 | Stop reason: HOSPADM

## 2023-04-25 RX ORDER — ACETAMINOPHEN 325 MG/1
650 TABLET ORAL EVERY 4 HOURS PRN
Status: DISCONTINUED | OUTPATIENT
Start: 2023-04-25 | End: 2023-04-26 | Stop reason: HOSPADM

## 2023-04-25 RX ORDER — DEXTROSE MONOHYDRATE 100 MG/ML
INJECTION, SOLUTION INTRAVENOUS
Status: COMPLETED | OUTPATIENT
Start: 2023-04-25 | End: 2023-04-26

## 2023-04-25 RX ORDER — IBUPROFEN 200 MG
16 TABLET ORAL
Status: DISCONTINUED | OUTPATIENT
Start: 2023-04-25 | End: 2023-04-25

## 2023-04-25 RX ORDER — TALC
6 POWDER (GRAM) TOPICAL NIGHTLY PRN
Status: DISCONTINUED | OUTPATIENT
Start: 2023-04-25 | End: 2023-04-26 | Stop reason: HOSPADM

## 2023-04-25 RX ORDER — ONDANSETRON 2 MG/ML
4 INJECTION INTRAMUSCULAR; INTRAVENOUS EVERY 8 HOURS PRN
Status: DISCONTINUED | OUTPATIENT
Start: 2023-04-25 | End: 2023-04-26 | Stop reason: HOSPADM

## 2023-04-25 RX ORDER — DULOXETIN HYDROCHLORIDE 60 MG/1
60 CAPSULE, DELAYED RELEASE ORAL 2 TIMES DAILY
Status: DISCONTINUED | OUTPATIENT
Start: 2023-04-25 | End: 2023-04-26 | Stop reason: HOSPADM

## 2023-04-25 RX ORDER — OXYCODONE HYDROCHLORIDE 5 MG/1
5 TABLET ORAL EVERY 6 HOURS PRN
Status: DISCONTINUED | OUTPATIENT
Start: 2023-04-25 | End: 2023-04-26 | Stop reason: HOSPADM

## 2023-04-25 RX ORDER — DEXTROSE 40 %
30 GEL (GRAM) ORAL
Status: DISCONTINUED | OUTPATIENT
Start: 2023-04-25 | End: 2023-04-26 | Stop reason: HOSPADM

## 2023-04-25 RX ORDER — HYDRALAZINE HYDROCHLORIDE 25 MG/1
25 TABLET, FILM COATED ORAL ONCE
Status: COMPLETED | OUTPATIENT
Start: 2023-04-25 | End: 2023-04-25

## 2023-04-25 RX ORDER — DEXTROSE MONOHYDRATE 100 MG/ML
INJECTION, SOLUTION INTRAVENOUS CONTINUOUS
Status: DISCONTINUED | OUTPATIENT
Start: 2023-04-25 | End: 2023-04-26 | Stop reason: HOSPADM

## 2023-04-25 RX ADMIN — ROPINIROLE HYDROCHLORIDE 0.5 MG: 0.5 TABLET, FILM COATED ORAL at 11:04

## 2023-04-25 RX ADMIN — DEXTROSE MONOHYDRATE: 100 INJECTION, SOLUTION INTRAVENOUS at 07:04

## 2023-04-25 RX ADMIN — DULOXETINE HYDROCHLORIDE 60 MG: 60 CAPSULE, DELAYED RELEASE ORAL at 09:04

## 2023-04-25 RX ADMIN — APIXABAN 5 MG: 5 TABLET, FILM COATED ORAL at 09:04

## 2023-04-25 RX ADMIN — CARVEDILOL 25 MG: 25 TABLET, FILM COATED ORAL at 09:04

## 2023-04-25 RX ADMIN — HYDRALAZINE HYDROCHLORIDE 25 MG: 25 TABLET, FILM COATED ORAL at 09:04

## 2023-04-25 NOTE — ED NOTES
I-STAT Chem-8+ Results:   Value Reference Range   Sodium 142 136-145 mmol/L   Potassium  3.7 3.5-5.1 mmol/L   Chloride 104  mmol/L   Ionized Calcium 1.19 1.06-1.42 mmol/L   CO2 (measured) 27 23-29 mmol/L   Glucose 75  mg/dL   BUN 20 6-30 mg/dL   Creatinine 1.2 0.5-1.4 mg/dL   Hematocrit 48 36-54%

## 2023-04-25 NOTE — ED TRIAGE NOTES
Pt is a 83 yr old female arrived to er room 3 with c/o hypoglycemia. Pt states she took 28 units of insulin before breakfast on today and went to her appointment. States she started feeling dizzy while at her appointment. Pt denies LOC. States nurse told her that her blood sugar was low and gave her juice to bring it up. Pt CBG is 84 on arrival to ED bed. Pt placed on monitor and changed into hospital gown with belongings and grand-daughter @ bedside.

## 2023-04-25 NOTE — ED PROVIDER NOTES
"Chief Complaint   Hypoglycemia (Pt presents from clinic across the street for hypoglycemia (CBG 30). Pt reports self-administering unknown amount of insulin this morning w/ a sudden drop in blood glucose and becoming cool/clammy. Given two apple juices w/ new CBG of 65. Pt states " I still feel real weak.")      History Of Present Illness   Henrietta Villasenor is a 83 y.o. female with history of insulin-dependent diabetes, recently admitted to the hospital for acute kidney injury, obstructing kidney stone status post stenting, who presents from primary care clinic where she was having a follow-up visit and was noted to be diaphoretic weak and had a blood sugar of 39.  She reportedly gave herself lantus 28U (her normal dose) at 10:30 a.m. this morning,, and her blood sugar was 83 prior to insulin administration.  She reports she had part of a biscuit and eggs this morning. Felt well prior to clinic visit, granddaughter at bedside reports she seemed fatigued but not altered. She was given some juice at primary care clinic and had improvement in her blood sugar, presents now for further evaluation.    History obtained from: patient, family member at bedside, and PCP note from today    Review of patient's allergies indicates:   Allergen Reactions    Crestor [rosuvastatin]      Cramping in legs    Ezetimibe Diarrhea     Other reaction(s): abdominal pain, Diarrhea      Hydrocodone Itching    Lisinopril      Other reaction(s): cough      Sulfa (sulfonamide antibiotics) Itching and Rash           Sulfamethoxazole     Sulfamethoxazole-trimethoprim     Valsartan Swelling       No current facility-administered medications on file prior to encounter.     Current Outpatient Medications on File Prior to Encounter   Medication Sig Dispense Refill    carvediloL (COREG) 25 MG tablet Take 1 tablet (25 mg total) by mouth 2 (two) times daily. 180 tablet 0    ACCU-CHEK GUIDE TEST STRIPS Strp TEST BLOOD SUGAR THREE TIMES DAILY 300 strip 3    " "ACCU-CHEK SOFT DEV LANCETS Kit       albuterol (PROVENTIL/VENTOLIN HFA) 90 mcg/actuation inhaler Inhale 2 puffs by mouth into the lungs every 4 (four) hours as needed for Wheezing. Rescue 25.5 g 3    amLODIPine (NORVASC) 10 MG tablet Take 1 tablet (10 mg total) by mouth once daily. 90 tablet 0    apixaban (ELIQUIS) 5 mg Tab Take 1 tablet (5 mg total) by mouth 2 (two) times daily. 180 tablet 0    atorvastatin (LIPITOR) 40 MG tablet Take 1 tablet (40 mg total) by mouth once daily. (Patient not taking: Reported on 4/25/2023) 90 tablet 3    BD ULTRA-FINE OLU PEN NEEDLE 32 gauge x 5/32" Ndle Uses 4 times daily, on multiple daily insulin injections 130 each 12    blood-glucose meter kit Use as instructed 1 each 0    cholecalciferol, vitamin D3, 125 mcg (5,000 unit) capsule Take 5,000 Units by mouth once daily.      DROPLET PEN NEEDLE 29 gauge x 1/2" Ndle USE FOUR TIMES DAILY 400 each 3    DROPSAFE ALCOHOL PREP PADS PadM USE TOPICALLY TO TEST BLOOD SUGAR FOUR TIMES DAILY 400 each 3    DULoxetine (CYMBALTA) 60 MG capsule Take 1 capsule (60 mg total) by mouth 2 (two) times daily. Please hold until you follow up with your primary care provider 180 capsule 1    fluticasone propionate (FLONASE) 50 mcg/actuation nasal spray Instill 2 sprays in each nostril once daily. 16 g 1    furosemide (LASIX) 40 MG tablet Please hold until you follow up with your primary care provider 90 tablet 0    insulin (LANTUS SOLOSTAR U-100 INSULIN) glargine 100 units/mL (3mL) SubQ pen Inject 60 Units into the skin once daily. 60 mL 6    insulin aspart U-100 (NOVOLOG) 100 unit/mL (3 mL) InPn pen Inject 24 Units into the skin before breakfast AND 28 Units daily with lunch AND 28 Units before dinner. 120 mL 3    lancets (ACCU-CHEK SOFTCLIX LANCETS) Misc TEST BLOOD SUGAR FOUR TIMES DAILY 400 each 3    loratadine (CLARITIN) 10 mg tablet Take 1 tablet (10 mg total) by mouth once daily. 30 tablet 4    olopatadine (PATADAY) 0.2 % Drop Place 1 drop into both " eyes once daily. 2.5 mL 12    [START ON 5/5/2023] oxyCODONE-acetaminophen (PERCOCET)  mg per tablet Take 1 tablet by mouth 5 (five) times daily. 150 tablet 0    oxyCODONE-acetaminophen (PERCOCET)  mg per tablet Take 1 tablet by mouth 5 (five) times daily. 150 tablet 0    oxyCODONE-acetaminophen (PERCOCET)  mg per tablet Take 1 tablet by mouth 5 (five) times daily. 150 tablet 0    pantoprazole (PROTONIX) 40 MG tablet Take 1 tablet (40 mg total) by mouth once daily. 90 tablet 1    potassium chloride (KLOR-CON) 10 MEQ TbSR Take 1 tablet (10 mEq total) by mouth once daily. Please hold until you follow up with your primary care provider 90 tablet 1    rOPINIRole (REQUIP) 0.5 MG tablet Take 1 tablet (0.5 mg total) by mouth every evening. 90 tablet 0    triamcinolone acetonide 0.1% (KENALOG) 0.1 % cream Apply topically 2 (two) times daily. 80 g 0       Past History   As per HPI and below:  Past Medical History:   Diagnosis Date    Abnormality of lung 11/08/2011    Stable bandlike opacities at the lung bases, most likely representing      Anxiety     Arthritis     CAD (coronary artery disease)     Calculus of ureter 2/22/2011    CHF (congestive heart failure)     Chronic back pain 7/29/2012    Colon polyp 9/2010; 11/2010    Colon polyps 7/29/2012    Coronary artery disease involving native coronary artery of native heart with angina pectoris     Depression     Diabetes mellitus     Diabetes mellitus type II     Diverticulitis large intestine 7/27/2015    Diverticulitis of large intestine without perforation or abscess without bleeding     Diverticulosis 09/25/2010; 11/02/2010; 11/08/2011; 7/29/2012    Duodenal disorder 08/25/2011    Duodenal erosion noted on EGD.    Duodenal ulcer, unspecified as acute or chronic, without hemorrhage, perforation, or obstruction 8/24/2011    E. coli sepsis 12/2010    Due to left ureteral stone with left nephrostomy tube - hospitalized in [place]    Esophageal dysmotility  01/24/2012    Noted on upper GI-barium swallow.    Facial weakness 1969    Left facial weakness s/p left mastoidectomy in ~ 1969.    Fatty liver 11/08/2011    Reported on CT-abdomen and in 06/2012 Gastro clinic visit note.    Gastric polyp 09/29/2010    GERD (gastroesophageal reflux disease) 7/29/2012    Hepatomegaly 11/08/2011    Reported on CT-abdomen    Herpes zoster with other nervous system complications(053.19) 2/28/2011    Hiatal hernia 06/26/2006; 09/29/2010; 08/25/2011    Noted on barium swallow 2006; noted on  EGD 2011.    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    Trouble in sleeping     Type II or unspecified type diabetes mellitus with neurological manifestations, not stated as uncontrolled(250.60)     Type II or unspecified type diabetes mellitus with peripheral circulatory disorders, not stated as uncontrolled(250.70)     Type II or unspecified type diabetes mellitus with renal manifestations, not stated as uncontrolled(250.40)      Past Surgical History:   Procedure Laterality Date    BELPHAROPTOSIS REPAIR      s/p LAMBERTO levator repair - Dr. Dejesus    CARDIAC CATHETERIZATION      CARPAL TUNNEL RELEASE  3/13/2012    CATARACT EXTRACTION W/  INTRAOCULAR LENS IMPLANT Right  10/31/2018        CATARACT EXTRACTION W/  INTRAOCULAR LENS IMPLANT Left 03/22/2018        CHOLECYSTECTOMY      COLONOSCOPY N/A 11/16/2016    Procedure: COLONOSCOPY;  Surgeon: Chris Storey MD;  Location: [facility] (4TH FLR);  Service: Endoscopy;  Laterality: N/A;    COLONOSCOPY N/A 6/14/2017    Procedure: COLONOSCOPY;  Surgeon: Chris Storey MD;  Location: [facility] ENDO (4TH FLR);  Service: Endoscopy;  Laterality: N/A;  colonoscopy in 3 months with better bowel prep. - Split PEG prep ordered    CYSTOSCOPY  4/19/2023    Procedure: CYSTOSCOPY;  Surgeon: Domo Bates MD;  Location: [facility] OR 1ST FLR;  Service: Urology;;    ESOPHAGOGASTRODUODENOSCOPY N/A 11/10/2021    Procedure: EGD (ESOPHAGOGASTRODUODENOSCOPY);  Surgeon: Kuldeep Velázquez MD;  Location: [facility] (4TH FLR);  Service: Endoscopy;  Laterality: N/A;  11/4-eliquis hold ok see te -tb-fully vacc-inst mail and verbal-    EXTRACORPOREAL SHOCK WAVE LITHOTRIPSY      INJECTION OF ANESTHETIC AGENT AROUND NERVE Bilateral 6/24/2020    Procedure: BLOCK, NERVE, C4-C5-C6 MEDIAL BRANCH ok per Talia to add on;  Surgeon: Oscar Ravi MD;  Location: [facility] PAIN MGT;  Service: Pain Management;  Laterality: Bilateral;    INTRAOCULAR PROSTHESES INSERTION Right 10/31/2018    Procedure: INSERTION-INTRAOCULAR LENS (IOL);  Surgeon: Keysha Valle MD;  Location: [facility];  Service: Ophthalmology;  Laterality: Right;    JOINT REPLACEMENT      mastoid tumor removal  1969    Left mastoidectomy with residual left facial weakness.    NEPHROSTOMY      OOPHORECTOMY      PHACOEMULSIFICATION OF CATARACT Right 10/31/2018    Procedure: PHACOEMULSIFICATION-ASPIRATION-CATARACT;  Surgeon: Keysha Valle MD;  Location: [facility];  Service: Ophthalmology;  Laterality: Right;    RETROGRADE PYELOGRAPHY Right 4/19/2023    Procedure: PYELOGRAM, RETROGRADE;  Surgeon: Domo Bates MD;  [facility];  Service: Urology;  Laterality: Right;    TOTAL  ABDOMINAL HYSTERECTOMY  1969    TAHBSO    TOTAL KNEE ARTHROPLASTY  2012    Left    TRANSFORAMINAL EPIDURAL INJECTION OF STEROID Bilateral 2019    Procedure: INJECTION, STEROID, EPIDURAL, TRANSFORAMINAL APPROACH;  Surgeon: Oscar Ravi MD;  Location: [facility] PAIN MGT;  Service: Pain Management;  Laterality: Bilateral;  B/L TFESI L4/L5    TRANSFORAMINAL EPIDURAL INJECTION OF STEROID Bilateral 10/2/2020    Procedure: INJECTION, STEROID, EPIDURAL, TRANSFORAMINAL APPROACH;  Surgeon: Oscar Ravi MD;  Location: [facility] PAIN MGT;  Service: Pain Management;  Laterality: Bilateral;  B/L TFESI L4/5    TRANSFORAMINAL EPIDURAL INJECTION OF STEROID Bilateral 2021    Procedure: INJECTION, STEROID, EPIDURAL, TRANSFORAMINAL APPROACH, L4-L5 clear to hold Eliquis 3 days;  Surgeon: Oscar Ravi MD;  Location: [facility] PAIN MGT;  Service: Pain Management;  Laterality: Bilateral;    URETERAL STENT PLACEMENT Right 2023    Procedure: INSERTION, STENT, URETER;  Surgeon: Doom Bates MD;  Location: [address];  Service: Urology;  Laterality: Right;       Social History     Socioeconomic History    Marital status: Single    Number of children: 5   Tobacco Use    Smoking status: Former     Packs/day: 1.00     Years: 15.00     Pack years: 15.00     Types: Cigarettes     Quit date: 2000     Years since quittin.7    Smokeless tobacco: Never   Substance and Sexual Activity    Alcohol use: No    Drug use: No    Sexual activity: Never   Social History Narrative    Single with 5 children. Lives alone. Retired cook.     Social Determinants of Health     Financial Resource Strain: Low Risk     Difficulty of Paying Living Expenses: Not hard at all   Food Insecurity: Food Insecurity Present    Worried About Running Out of Food in the Last Year: Sometimes true    Ran Out of Food in the Last Year: Never true   Transportation Needs: No Transportation Needs    Lack of Transportation (Medical): No    Lack of  Transportation (Non-Medical): No   Physical Activity: Inactive    Days of Exercise per Week: 0 days    Minutes of Exercise per Session: 0 min   Housing Stability: High Risk    Unable to Pay for Housing in the Last Year: Yes    Number of Places Lived in the Last Year: 1    Unstable Housing in the Last Year: No       Family History   Problem Relation Age of Onset    Sleep apnea Sister     Diabetes Sister     Cancer Mother         brain tumor    Hypertension Mother     Heart disease Father     Heart attack Father     Dementia Brother     Lupus Brother     Frontotemporal dementia Brother     No Known Problems Daughter     No Known Problems Son     Diabetes Sister     Lupus Sister     Breast cancer Sister     Diabetes Sister     Cancer Sister         breast cancer    Heart attack Sister     Diabetes Sister     Liver cancer Brother     Drug abuse Brother     Alcohol abuse Brother     Cirrhosis Brother     Drug abuse Brother     No Known Problems Daughter     No Known Problems Son     No Known Problems Son     No Known Problems Maternal Grandmother     No Known Problems Maternal Grandfather     No Known Problems Paternal Grandmother     No Known Problems Paternal Grandfather     Diabetes Brother     Dementia Brother     Diabetes Other     Ovarian cancer Other         Niece     Breast cancer Maternal Aunt     No Known Problems Maternal Uncle     No Known Problems Paternal Aunt     No Known Problems Paternal Uncle     Glaucoma Neg Hx     Blindness Neg Hx     Celiac disease Neg Hx     Colon cancer Neg Hx     Colon polyps Neg Hx     Esophageal cancer Neg Hx     Inflammatory bowel disease Neg Hx     Liver disease Neg Hx     Rectal cancer Neg Hx     Stomach cancer Neg Hx     Ulcerative colitis Neg Hx     Melanoma Neg Hx     Multiple sclerosis Neg Hx     Psoriasis Neg Hx     Amblyopia Neg Hx     Cataracts Neg Hx     Macular degeneration Neg Hx     Retinal detachment Neg Hx     Strabismus Neg Hx     Stroke Neg Hx     Thyroid  disease Neg Hx        Physical Exam     Vitals:    04/25/23 1848 04/25/23 2000 04/25/23 2002 04/25/23 2033   BP: (!) 179/82  (!) 184/87 (!) 188/97   BP Location:       Patient Position:       Pulse: 64  73 68   Resp:       Temp:  97.4 °F (36.3 °C)     TempSrc:  Oral     SpO2: 95%  97% 97%   Weight:       Height:         Gen: AxOx3, NAD, well nourished, appears stated age  Eye: EOMI, no scleral icterus, no periorbital edema or ecchymosis  Head: normocephalic, atraumatic, no lesions, scalp appears normal  ENT: neck supple, no stridor, no masses, no drooling or voice changes  CVS: RRR, no m/r/g, distal pulses intact/symmetric  Pulm: CTAB, no wheezes, rales or rhonchi, no increased work of breathing  Abd: soft, nontender, nondistended, no organomegaly, no CVAT  Ext: no edema, no lesions, rashes, or deformity  Neuro: GCS15, moving all extremities, gait intact, face grossly symmetric  Psych: normal affect, cooperative, well groomed, makes good eye contact      Initial Impression and MDM   This is an 83-year-old woman who is insulin-dependent diabetes who presents with acute onset hypoglycemic episode improved with oral glucose.  She did administer a long-acting insulin, so we will monitor her for several hours to ensure clinical stability.  And will evaluate labs including renal function, electrolytes, or infection that may cause hypermetabolic rate versus for metabolism of insulin.    Additional Considerations:   Additional testing  was not considered during the course of this workup.  Comorbidities taken into consideration during the patient's evaluation and treatment include:     Social determinants of health taken into consideration during development of our treatment plan include:    Medications Given     Medications   hydrALAZINE tablet 25 mg (has no administration in time range)   amLODIPine tablet 10 mg (has no administration in time range)   apixaban tablet 5 mg (has no administration in time range)    atorvastatin tablet 40 mg (has no administration in time range)   carvediloL tablet 25 mg (has no administration in time range)   DULoxetine DR capsule 60 mg (has no administration in time range)   rOPINIRole tablet 0.5 mg (has no administration in time range)   dextrose 10 % infusion ( Intravenous New Bag 4/25/23 1958)       Results and Course     Labs Reviewed   CBC W/ AUTO DIFFERENTIAL - Abnormal; Notable for the following components:       Result Value    MCH 26.9 (*)     MCHC 30.8 (*)     RDW 14.6 (*)     Immature Granulocytes 0.6 (*)     Immature Grans (Abs) 0.07 (*)     All other components within normal limits   COMPREHENSIVE METABOLIC PANEL - Abnormal; Notable for the following components:    Potassium 3.3 (*)     Glucose 62 (*)     Albumin 3.1 (*)     eGFR 55.9 (*)     All other components within normal limits   POCT GLUCOSE - Abnormal; Notable for the following components:    POCT Glucose 57 (*)     All other components within normal limits   POCT GLUCOSE - Abnormal; Notable for the following components:    POCT Glucose 65 (*)     All other components within normal limits   URINALYSIS, REFLEX TO URINE CULTURE    Narrative:     Specimen Source->Urine   TROPONIN ISTAT   LIPASE   POCT GLUCOSE   POCT GLUCOSE   POCT GLUCOSE   ISTAT PROCEDURE   POCT GLUCOSE   ISTAT CHEM8       Imaging Results    None                  Additional MDM   83 y.o. female with recurrent hypoglycemia despite po intake. Will initiate D10 infusion. Labs otherwise reassuring, UA pending. Plan for observation in the hospital for further care.       Critical Care Procedure Note    Authorized and Performed by: Saarh Malin MD    Total critical care time: Approximately 30 minutes    Due to a high probability of clinically significant, life threatening deterioration, the patient required my highest level of preparedness to intervene emergently and I personally spent this critical care time directly and personally managing the patient.  This critical care time included obtaining a history from the patient and/or relative; examining the patient; pulse oximetry; ordering and review of studies; arranging emergent treatment with development of a management plan; evaluation of patient's response to treatment; frequent reassessment; and, discussions with other providers.    This critical care time was performed to assess and manage the high probability of imminent, life-threatening deterioration that could result in multi-organ failure. It was exclusive of separately billable procedures and treating other patients and teaching time.           Final diagnoses:  [E16.2] Hypoglycemia        ED Disposition Condition    Observation                   Sarah Malin MD  04/25/23 2100

## 2023-04-25 NOTE — PROGRESS NOTES
"Subjective:       Patient ID: Henrietta Villasenor is a 83 y.o. female.        Chief Complaint: Hospital Follow Up    Henrietta Villasenor is an established patient of Jeannine Arriaga MD here today for follow up visit.    Admitted 4/18-4/20.  HANNAH and obstructing kidney stone s/p stent.  Urology f/u scheduled.    Began feeling weak during visit.  Obviously sweating as well.  Checked blood sugar and 39.  She does not know how many units of insulin she administered.  She knows she gave herself insulin around 10:30.  Blood sugar was 83 at that time BEFORE she administered insulin.    She lives alone.    She is unsure of her medications but did bring some pill bottles in today.    Urinating fine.  No N/V.  Eating fine.    She knows she has two types of insulin and "trying to follow my list."  She is not sure which insulin she administered this morning.         Review of Systems   Constitutional:  Positive for diaphoresis. Negative for chills, fatigue and fever.   HENT:  Negative for congestion and sore throat.    Eyes:  Negative for visual disturbance.   Respiratory:  Negative for cough, chest tightness and shortness of breath.    Cardiovascular:  Negative for chest pain, palpitations and leg swelling.   Gastrointestinal:  Negative for abdominal pain, blood in stool, constipation, diarrhea, nausea and vomiting.   Genitourinary:  Negative for dysuria, frequency, hematuria and urgency.   Musculoskeletal:  Negative for arthralgias and back pain.   Skin:  Negative for rash.   Neurological:  Positive for dizziness and weakness. Negative for syncope and headaches.   Psychiatric/Behavioral:  Negative for dysphoric mood and sleep disturbance. The patient is not nervous/anxious.      Objective:      Physical Exam  Vitals and nursing note reviewed.   Constitutional:       Appearance: Normal appearance. She is well-developed.      Comments: Sweaty and weak  Lying on exam table   HENT:      Head: Normocephalic.      Right Ear: External ear " normal.      Left Ear: External ear normal.   Eyes:      Pupils: Pupils are equal, round, and reactive to light.   Cardiovascular:      Rate and Rhythm: Normal rate and regular rhythm.      Heart sounds: Normal heart sounds. No murmur heard.    No friction rub. No gallop.   Pulmonary:      Effort: Pulmonary effort is normal. No respiratory distress.      Breath sounds: Normal breath sounds.   Abdominal:      Palpations: Abdomen is soft.      Tenderness: There is no abdominal tenderness.   Skin:     General: Skin is warm and dry.   Neurological:      Mental Status: She is alert.       Assessment:       1. HANNAH (acute kidney injury)    2. Essential hypertension    3. Type 2 diabetes mellitus with stage 3a chronic kidney disease, with long-term current use of insulin    4. Hypoglycemia        Plan:       Henrietta was seen today for hospital follow up.    Diagnoses and all orders for this visit:    HANNAH (acute kidney injury) - will need repeat lab work but going to ED    Essential hypertension - /60    Type 2 diabetes mellitus with stage 3a chronic kidney disease, with long-term current use of insulin  -     POCT Glucose, Hand-Held Device  -     POCT Glucose, Hand-Held Device    Hypoglycemia    Patient began feeling weak during visit and began sweating  Blood sugar 39 at 2:34  Gave 8 oz apple juice  Blood sugar 65 at 2:54  Gave 8 oz more of apple juice  Blood sugar 72 at 3:04    Patient taken to ED by nurse for further evaluation of hypoglycemia    She administered unknown amount of insulin around 10:30 this morning    She lives alone    Her granddaughter dropped her off at appointment    I spoke with her granddaughter, who will meet her in the ED (Abril Griffith ).  She also has a daughter, April, who lives in [place].    >45 minutes spent on patient encounter    Needs evaluation in ED and at least monitoring overnight to ensure blood sugar does not continue to drop as she has no help at home    Pt has been  "given instructions populated from patient instructions database and has verbalized understanding of the after visit summary and information contained wherein.    Follow up with a primary care provider. May go to ER for acute shortness of breath, lightheadedness, fever, or any other emergent complaints or changes in condition.    "This note will be shared with the patient"    Future Appointments   Date Time Provider Department Center   4/27/2023 11:30 AM DIETITIAN, GENERAL INT MED [facility] NUTRI [facility]   5/2/2023  9:00 AM Laz Rudolph Jr., MD [facility] UROLOGY [facility]   6/8/2023 12:00 PM Jeannine Arriaga MD [facility] IM [facility]   7/12/2023 11:00 AM Georgina Clemens OD [facility] OPTICL [facility]                 "

## 2023-04-26 VITALS
TEMPERATURE: 98 F | HEIGHT: 68 IN | BODY MASS INDEX: 30.31 KG/M2 | OXYGEN SATURATION: 98 % | WEIGHT: 200 LBS | HEART RATE: 65 BPM | SYSTOLIC BLOOD PRESSURE: 115 MMHG | RESPIRATION RATE: 22 BRPM | DIASTOLIC BLOOD PRESSURE: 55 MMHG

## 2023-04-26 PROBLEM — E83.42 HYPOMAGNESEMIA: Status: ACTIVE | Noted: 2023-04-26

## 2023-04-26 PROBLEM — E11.649 HYPOGLYCEMIA ASSOCIATED WITH DIABETES: Status: ACTIVE | Noted: 2023-04-26

## 2023-04-26 PROBLEM — E87.6 HYPOKALEMIA: Status: ACTIVE | Noted: 2023-04-26

## 2023-04-26 PROBLEM — Z86.718 HISTORY OF DVT (DEEP VEIN THROMBOSIS): Status: ACTIVE | Noted: 2023-04-26

## 2023-04-26 LAB
ALBUMIN SERPL BCP-MCNC: 3 G/DL (ref 3.5–5.2)
ALP SERPL-CCNC: 89 U/L (ref 55–135)
ALT SERPL W/O P-5'-P-CCNC: 10 U/L (ref 10–44)
ANION GAP SERPL CALC-SCNC: 11 MMOL/L (ref 8–16)
AST SERPL-CCNC: 18 U/L (ref 10–40)
BASOPHILS # BLD AUTO: 0.09 K/UL (ref 0–0.2)
BASOPHILS NFR BLD: 0.8 % (ref 0–1.9)
BILIRUB SERPL-MCNC: 0.5 MG/DL (ref 0.1–1)
BUN SERPL-MCNC: 15 MG/DL (ref 8–23)
CALCIUM SERPL-MCNC: 9.8 MG/DL (ref 8.7–10.5)
CHLORIDE SERPL-SCNC: 104 MMOL/L (ref 95–110)
CO2 SERPL-SCNC: 27 MMOL/L (ref 23–29)
CREAT SERPL-MCNC: 1.1 MG/DL (ref 0.5–1.4)
DIFFERENTIAL METHOD: ABNORMAL
EOSINOPHIL # BLD AUTO: 0.3 K/UL (ref 0–0.5)
EOSINOPHIL NFR BLD: 2.4 % (ref 0–8)
ERYTHROCYTE [DISTWIDTH] IN BLOOD BY AUTOMATED COUNT: 14.5 % (ref 11.5–14.5)
EST. GFR  (NO RACE VARIABLE): 49.9 ML/MIN/1.73 M^2
GLUCOSE SERPL-MCNC: 141 MG/DL (ref 70–110)
GLUCOSE SERPL-MCNC: 282 MG/DL (ref 70–110)
HCT VFR BLD AUTO: 36.7 % (ref 37–48.5)
HGB BLD-MCNC: 11.5 G/DL (ref 12–16)
IMM GRANULOCYTES # BLD AUTO: 0.08 K/UL (ref 0–0.04)
IMM GRANULOCYTES NFR BLD AUTO: 0.7 % (ref 0–0.5)
LYMPHOCYTES # BLD AUTO: 3 K/UL (ref 1–4.8)
LYMPHOCYTES NFR BLD: 25.9 % (ref 18–48)
MAGNESIUM SERPL-MCNC: 1.2 MG/DL (ref 1.6–2.6)
MCH RBC QN AUTO: 26.9 PG (ref 27–31)
MCHC RBC AUTO-ENTMCNC: 31.3 G/DL (ref 32–36)
MCV RBC AUTO: 86 FL (ref 82–98)
MONOCYTES # BLD AUTO: 1.1 K/UL (ref 0.3–1)
MONOCYTES NFR BLD: 9.2 % (ref 4–15)
NEUTROPHILS # BLD AUTO: 7 K/UL (ref 1.8–7.7)
NEUTROPHILS NFR BLD: 61 % (ref 38–73)
NRBC BLD-RTO: 0 /100 WBC
PHOSPHATE SERPL-MCNC: 3.8 MG/DL (ref 2.7–4.5)
PLATELET # BLD AUTO: 290 K/UL (ref 150–450)
PMV BLD AUTO: 10 FL (ref 9.2–12.9)
POCT GLUCOSE: 114 MG/DL (ref 70–110)
POCT GLUCOSE: 149 MG/DL (ref 70–110)
POCT GLUCOSE: 154 MG/DL (ref 70–110)
POCT GLUCOSE: 165 MG/DL (ref 70–110)
POCT GLUCOSE: 229 MG/DL (ref 70–110)
POTASSIUM SERPL-SCNC: 3.5 MMOL/L (ref 3.5–5.1)
PROT SERPL-MCNC: 7.1 G/DL (ref 6–8.4)
RBC # BLD AUTO: 4.27 M/UL (ref 4–5.4)
SODIUM SERPL-SCNC: 142 MMOL/L (ref 136–145)
WBC # BLD AUTO: 11.5 K/UL (ref 3.9–12.7)

## 2023-04-26 PROCEDURE — G0378 HOSPITAL OBSERVATION PER HR: HCPCS | Mod: HCNC

## 2023-04-26 PROCEDURE — 25000003 PHARM REV CODE 250: Mod: HCNC | Performed by: FAMILY MEDICINE

## 2023-04-26 PROCEDURE — 85025 COMPLETE CBC W/AUTO DIFF WBC: CPT | Mod: HCNC | Performed by: FAMILY MEDICINE

## 2023-04-26 PROCEDURE — 83735 ASSAY OF MAGNESIUM: CPT | Mod: HCNC | Performed by: FAMILY MEDICINE

## 2023-04-26 PROCEDURE — 96366 THER/PROPH/DIAG IV INF ADDON: CPT

## 2023-04-26 PROCEDURE — 80053 COMPREHEN METABOLIC PANEL: CPT | Mod: HCNC | Performed by: FAMILY MEDICINE

## 2023-04-26 PROCEDURE — 82962 GLUCOSE BLOOD TEST: CPT | Mod: HCNC

## 2023-04-26 PROCEDURE — 96367 TX/PROPH/DG ADDL SEQ IV INF: CPT

## 2023-04-26 PROCEDURE — 84100 ASSAY OF PHOSPHORUS: CPT | Mod: HCNC | Performed by: FAMILY MEDICINE

## 2023-04-26 PROCEDURE — 36415 COLL VENOUS BLD VENIPUNCTURE: CPT | Mod: HCNC | Performed by: FAMILY MEDICINE

## 2023-04-26 PROCEDURE — 99236 PR OBSERV/HOSP SAME DATE,LEVL V: ICD-10-PCS | Mod: HCNC,,, | Performed by: FAMILY MEDICINE

## 2023-04-26 PROCEDURE — 63600175 PHARM REV CODE 636 W HCPCS: Mod: HCNC | Performed by: PHYSICIAN ASSISTANT

## 2023-04-26 PROCEDURE — 99236 HOSP IP/OBS SAME DATE HI 85: CPT | Mod: HCNC,,, | Performed by: FAMILY MEDICINE

## 2023-04-26 PROCEDURE — 96366 THER/PROPH/DIAG IV INF ADDON: CPT | Mod: HCNC

## 2023-04-26 RX ORDER — CARVEDILOL 25 MG/1
25 TABLET ORAL 2 TIMES DAILY
Qty: 60 TABLET | Refills: 0 | Status: SHIPPED | OUTPATIENT
Start: 2023-04-26 | End: 2023-07-05 | Stop reason: SDUPTHER

## 2023-04-26 RX ORDER — AMLODIPINE BESYLATE 10 MG/1
10 TABLET ORAL DAILY
Qty: 30 TABLET | Refills: 0 | Status: SHIPPED | OUTPATIENT
Start: 2023-04-26 | End: 2024-01-03 | Stop reason: SDUPTHER

## 2023-04-26 RX ORDER — ROPINIROLE 0.5 MG/1
0.5 TABLET, FILM COATED ORAL NIGHTLY
Qty: 30 TABLET | Refills: 0 | Status: SHIPPED | OUTPATIENT
Start: 2023-04-26 | End: 2023-10-04 | Stop reason: SDUPTHER

## 2023-04-26 RX ORDER — OLOPATADINE HYDROCHLORIDE 2 MG/ML
1 SOLUTION/ DROPS OPHTHALMIC DAILY
Qty: 2.5 ML | Refills: 0 | Status: SHIPPED | OUTPATIENT
Start: 2023-04-26 | End: 2024-04-25

## 2023-04-26 RX ORDER — MAGNESIUM SULFATE HEPTAHYDRATE 40 MG/ML
2 INJECTION, SOLUTION INTRAVENOUS
Status: COMPLETED | OUTPATIENT
Start: 2023-04-26 | End: 2023-04-26

## 2023-04-26 RX ADMIN — MAGNESIUM SULFATE 2 G: 2 INJECTION INTRAVENOUS at 09:04

## 2023-04-26 RX ADMIN — DULOXETINE HYDROCHLORIDE 60 MG: 60 CAPSULE, DELAYED RELEASE ORAL at 09:04

## 2023-04-26 RX ADMIN — CARVEDILOL 25 MG: 25 TABLET, FILM COATED ORAL at 09:04

## 2023-04-26 RX ADMIN — AMLODIPINE BESYLATE 10 MG: 10 TABLET ORAL at 09:04

## 2023-04-26 RX ADMIN — APIXABAN 5 MG: 5 TABLET, FILM COATED ORAL at 09:04

## 2023-04-26 RX ADMIN — MAGNESIUM SULFATE 2 G: 2 INJECTION INTRAVENOUS at 11:04

## 2023-04-26 RX ADMIN — ATORVASTATIN CALCIUM 40 MG: 40 TABLET, FILM COATED ORAL at 09:04

## 2023-04-26 NOTE — ASSESSMENT & PLAN NOTE
- secondary to unintentional insulin misuse. Patient recently admitted and BG controled with Basal 15 units BID and moderate SSI. By report seems patient took 60 units Basal last night and 28 units Basal again this am with breakfast.  - holding home insulins at this time  - D10 discontinued. BG remains in good range  - I educated patient regarding her home dose of insulin and appropriate use. Hold bolus insulin if not eating meal. Monitor BG closely at home and follow up with PCP and endocrinology outpatient.

## 2023-04-26 NOTE — SUBJECTIVE & OBJECTIVE
Past Medical History:   Diagnosis Date    Abnormality of lung 11/08/2011    Stable bandlike opacities at the lung bases, most likely representing      Anxiety     Arthritis     CAD (coronary artery disease)     Calculus of ureter 2/22/2011    CHF (congestive heart failure)     Chronic back pain 7/29/2012    Colon polyp 9/2010; 11/2010    Colon polyps 7/29/2012    Coronary artery disease involving native coronary artery of native heart with angina pectoris     Depression     Diabetes mellitus     Diabetes mellitus type II     Diverticulitis large intestine 7/27/2015    Diverticulitis of large intestine without perforation or abscess without bleeding     Diverticulosis 09/25/2010; 11/02/2010; 11/08/2011; 7/29/2012    Duodenal disorder 08/25/2011    Duodenal erosion noted on EGD.    Duodenal ulcer, unspecified as acute or chronic, without hemorrhage, perforation, or obstruction 8/24/2011    E. coli sepsis 12/2010    Due to left ureteral stone with left nephrostomy tube - hospitalized in [place]    Esophageal dysmotility 01/24/2012    Noted on upper GI-barium swallow.    Facial weakness 1969    Left facial weakness s/p left mastoidectomy in ~ 1969.    Fatty liver 11/08/2011    Reported on CT-abdomen and in 06/2012 Gastro clinic visit note.    Gastric polyp 09/29/2010    GERD (gastroesophageal reflux disease) 7/29/2012    Hepatomegaly 11/08/2011    Reported on CT-abdomen    Herpes zoster with other nervous system complications(053.19) 2/28/2011    Hiatal hernia 06/26/2006; 09/29/2010; 08/25/2011    Noted on barium swallow 2006; noted on  EGD 2011.    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    Trouble in sleeping     Type II or unspecified type diabetes mellitus with neurological manifestations, not stated as uncontrolled(250.60)     Type II or unspecified type diabetes mellitus with peripheral circulatory disorders, not stated as uncontrolled(250.70)     Type II or unspecified type diabetes mellitus with renal manifestations, not stated as uncontrolled(250.40)        Past Surgical History:   Procedure Laterality Date    BELPHAROPTOSIS REPAIR      s/p LAMBERTO levator repair - Dr. Dejesus    CARDIAC CATHETERIZATION      CARPAL TUNNEL RELEASE  3/13/2012    CATARACT EXTRACTION W/  INTRAOCULAR LENS IMPLANT Right 10/31/2018        CATARACT EXTRACTION W/  INTRAOCULAR LENS IMPLANT Left 03/22/2018        CHOLECYSTECTOMY      COLONOSCOPY N/A 11/16/2016    Procedure: COLONOSCOPY;  Surgeon: Chris Storey MD;  Location: [facility] F[facility]);  Service: Endoscopy;  Laterality: N/A;    COLONOSCOPY N/A 6/14/2017    Procedure: COLONOSCOPY;  Surgeon: Chris Storey MD;  Location: [facility] (4TH FLR);  Service: Endoscopy;  Laterality: N/A;  colonoscopy in 3 months with better bowel prep. - Split PEG prep ordered    CYSTOSCOPY  4/19/2023    Procedure: CYSTOSCOPY;  Surgeon: Domo Bates MD;  [facility];  Service: Urology;;    ESOPHAGOGASTRODUODENOSCOPY N/A 11/10/2021    Procedure: EGD (ESOPHAGOGASTRODUODENOSCOPY);  Surgeon: Kuldeep Velázquez MD;  Location: [facility] (4TH FLR);  Service: Endoscopy;  Laterality: N/A;  11/4-eliquis hold ok see te -tb-fully vacc-inst mail and verbal-     EXTRACORPOREAL SHOCK WAVE LITHOTRIPSY      INJECTION OF ANESTHETIC AGENT AROUND NERVE Bilateral 6/24/2020    Procedure: BLOCK, NERVE, C4-C5-C6 MEDIAL BRANCH ok per Talia to add on;  Surgeon: Oscar Ravi MD;  Location: [facility] [place], operated by [facility]GT;  Service: Pain Management;  Laterality: Bilateral;    INTRAOCULAR PROSTHESES INSERTION Right 10/31/2018    Procedure: INSERTION-INTRAOCULAR LENS (IOL);  Surgeon: Keysha Valle MD;  Location: [facility];  Service: Ophthalmology;  Laterality: Right;    JOINT REPLACEMENT      mastoid tumor removal  1969    Left mastoidectomy with residual left facial weakness.    NEPHROSTOMY      OOPHORECTOMY      PHACOEMULSIFICATION OF CATARACT Right 10/31/2018    Procedure: PHACOEMULSIFICATION-ASPIRATION-CATARACT;  Surgeon: Keysha Valle MD;  Location: [facility] [place], operated by [facility] OR;  Service: Ophthalmology;  Laterality: Right;    RETROGRADE PYELOGRAPHY Right 4/19/2023    Procedure: PYELOGRAM, RETROGRADE;  Surgeon: Domo Bates MD;  Location: [address];  Service: Urology;  Laterality: Right;    TOTAL ABDOMINAL HYSTERECTOMY  1969    TAHBSO    TOTAL KNEE ARTHROPLASTY  9/13/2012    Left    TRANSFORAMINAL EPIDURAL INJECTION OF STEROID Bilateral 9/17/2019    Procedure: INJECTION, STEROID, EPIDURAL, TRANSFORAMINAL APPROACH;  Surgeon: Oscar Ravi MD;  Location: [facility] [place], operated by [facility];  Service: Pain Management;  Laterality: Bilateral;  B/L TFESI L4/L5    TRANSFORAMINAL EPIDURAL INJECTION OF STEROID Bilateral 10/2/2020    Procedure: INJECTION, STEROID, EPIDURAL, TRANSFORAMINAL APPROACH;  Surgeon: Oscar Ravi MD;  Location: [facility] [place], operated by [facility];  Service: Pain Management;  Laterality: Bilateral;  B/L TFESI L4/5    TRANSFORAMINAL EPIDURAL INJECTION OF STEROID Bilateral 5/7/2021    Procedure: INJECTION, STEROID, EPIDURAL, TRANSFORAMINAL APPROACH, L4-L5 clear to hold Eliquis 3 days;  Surgeon: Oscar Ravi MD;  Location: [facility] [place], operated [facility];  Service: Pain Management;  Laterality: Bilateral;    URETERAL STENT PLACEMENT Right  "4/19/2023    Procedure: INSERTION, STENT, URETER;  Surgeon: Domo Bates MD;  Location: [facility] OR [address] [place] [address];  Service: Urology;  Laterality: Right;       Review of patient's allergies indicates:   Allergen Reactions    Crestor [rosuvastatin]      Cramping in legs    Ezetimibe Diarrhea     Other reaction(s): abdominal pain, Diarrhea      Hydrocodone Itching    Lisinopril      Other reaction(s): cough      Sulfa (sulfonamide antibiotics) Itching and Rash           Sulfamethoxazole     Sulfamethoxazole-trimethoprim     Valsartan Swelling       No current facility-administered medications on file prior to encounter.     Current Outpatient Medications on File Prior to Encounter   Medication Sig    carvediloL (COREG) 25 MG tablet Take 1 tablet (25 mg total) by mouth 2 (two) times daily.    ACCU-CHEK GUIDE TEST STRIPS Strp TEST BLOOD SUGAR THREE TIMES DAILY    ACCU-CHEK SOFT DEV LANCETS Kit     albuterol (PROVENTIL/VENTOLIN HFA) 90 mcg/actuation inhaler Inhale 2 puffs by mouth into the lungs every 4 (four) hours as needed for Wheezing. Rescue    amLODIPine (NORVASC) 10 MG tablet Take 1 tablet (10 mg total) by mouth once daily.    apixaban (ELIQUIS) 5 mg Tab Take 1 tablet (5 mg total) by mouth 2 (two) times daily.    atorvastatin (LIPITOR) 40 MG tablet Take 1 tablet (40 mg total) by mouth once daily. (Patient not taking: Reported on 4/25/2023)    BD ULTRA-FINE OLU PEN NEEDLE 32 gauge x 5/32" Ndle Uses 4 times daily, on multiple daily insulin injections    blood-glucose meter kit Use as instructed    cholecalciferol, vitamin D3, 125 mcg (5,000 unit) capsule Take 5,000 Units by mouth once daily.    DROPLET PEN NEEDLE 29 gauge x 1/2" Ndle USE FOUR TIMES DAILY    DROPSAFE ALCOHOL PREP PADS PadM USE TOPICALLY TO TEST BLOOD SUGAR FOUR TIMES DAILY    DULoxetine (CYMBALTA) 60 MG capsule Take 1 capsule (60 mg total) by mouth 2 (two) times daily. Please hold until you follow up with your primary care provider    fluticasone " propionate (FLONASE) 50 mcg/actuation nasal spray Instill 2 sprays in each nostril once daily.    furosemide (LASIX) 40 MG tablet Please hold until you follow up with your primary care provider    insulin (LANTUS SOLOSTAR U-100 INSULIN) glargine 100 units/mL (3mL) SubQ pen Inject 60 Units into the skin once daily.    insulin aspart U-100 (NOVOLOG) 100 unit/mL (3 mL) InPn pen Inject 24 Units into the skin before breakfast AND 28 Units daily with lunch AND 28 Units before dinner.    lancets (ACCU-CHEK SOFTCLIX LANCETS) Misc TEST BLOOD SUGAR FOUR TIMES DAILY    loratadine (CLARITIN) 10 mg tablet Take 1 tablet (10 mg total) by mouth once daily.    olopatadine (PATADAY) 0.2 % Drop Place 1 drop into both eyes once daily.    [START ON 5/5/2023] oxyCODONE-acetaminophen (PERCOCET)  mg per tablet Take 1 tablet by mouth 5 (five) times daily.    oxyCODONE-acetaminophen (PERCOCET)  mg per tablet Take 1 tablet by mouth 5 (five) times daily.    oxyCODONE-acetaminophen (PERCOCET)  mg per tablet Take 1 tablet by mouth 5 (five) times daily.    pantoprazole (PROTONIX) 40 MG tablet Take 1 tablet (40 mg total) by mouth once daily.    potassium chloride (KLOR-CON) 10 MEQ TbSR Take 1 tablet (10 mEq total) by mouth once daily. Please hold until you follow up with your primary care provider    rOPINIRole (REQUIP) 0.5 MG tablet Take 1 tablet (0.5 mg total) by mouth every evening.    triamcinolone acetonide 0.1% (KENALOG) 0.1 % cream Apply topically 2 (two) times daily.     Family History       Problem Relation (Age of Onset)    Alcohol abuse Brother    Breast cancer Sister, Maternal Aunt    Cancer Mother, Sister    Cirrhosis Brother    Dementia Brother, Brother    Diabetes Sister, Sister, Sister, Sister, Brother, Other    Drug abuse Brother, Brother    Frontotemporal dementia Brother    Heart attack Father, Sister    Heart disease Father    Hypertension Mother    Liver cancer Brother    Lupus Brother, Sister    No Known  Problems Daughter, Son, Daughter, Son, Son, Maternal Grandmother, Maternal Grandfather, Paternal Grandmother, Paternal Grandfather, Maternal Uncle, Paternal Aunt, Paternal Uncle    Ovarian cancer Other    Sleep apnea Sister          Tobacco Use    Smoking status: Former     Packs/day: 1.00     Years: 15.00     Pack years: 15.00     Types: Cigarettes     Quit date: 2000     Years since quittin.7    Smokeless tobacco: Never   Substance and Sexual Activity    Alcohol use: No    Drug use: No    Sexual activity: Never     Review of Systems   Constitutional:  Positive for fatigue. Negative for chills and fever.   HENT:  Negative for sore throat and trouble swallowing.    Eyes:  Negative for photophobia and visual disturbance.   Respiratory:  Negative for cough, shortness of breath and wheezing.    Cardiovascular:  Negative for chest pain, palpitations and leg swelling.   Gastrointestinal:  Negative for abdominal distention, abdominal pain, diarrhea, nausea and vomiting.   Genitourinary:  Negative for dysuria and hematuria.   Musculoskeletal:  Negative for neck pain and neck stiffness.   Skin:  Negative for rash and wound.   Neurological:  Negative for seizures, syncope, weakness, numbness and headaches.   Psychiatric/Behavioral:  Negative for confusion and decreased concentration.    Objective:     Vital Signs (Most Recent):  Temp: 97.8 °F (36.6 °C) (23 003)  Pulse: 71 (23 003)  Resp: 18 (23 003)  BP: 129/68 (23)  SpO2: 96 % (23) Vital Signs (24h Range):  Temp:  [97.4 °F (36.3 °C)-97.8 °F (36.6 °C)] 97.8 °F (36.6 °C)  Pulse:  [63-83] 71  Resp:  [17-20] 18  SpO2:  [93 %-100 %] 96 %  BP: (129-195)/(60-97) 129/68     Weight: 90.7 kg (200 lb)  Body mass index is 30.41 kg/m².    Physical Exam  Constitutional:       General: She is not in acute distress.     Appearance: She is not toxic-appearing or diaphoretic.   HENT:      Head: Normocephalic and atraumatic.      Nose: Nose  normal.   Eyes:      General: No scleral icterus.     Extraocular Movements: Extraocular movements intact.      Pupils: Pupils are equal, round, and reactive to light.   Cardiovascular:      Rate and Rhythm: Normal rate and regular rhythm.   Pulmonary:      Effort: Pulmonary effort is normal. No respiratory distress.      Breath sounds: No wheezing or rales.   Abdominal:      General: Abdomen is flat. There is no distension.      Palpations: Abdomen is soft.      Tenderness: There is no abdominal tenderness. There is no guarding.   Musculoskeletal:         General: Normal range of motion.      Cervical back: Normal range of motion and neck supple. No rigidity.      Right lower leg: No edema.      Left lower leg: No edema.   Skin:     General: Skin is warm and dry.      Coloration: Skin is not jaundiced.   Neurological:      General: No focal deficit present.      Mental Status: She is alert and oriented to person, place, and time.      Cranial Nerves: No cranial nerve deficit.   Psychiatric:         Mood and Affect: Mood normal.         Behavior: Behavior normal.         CRANIAL NERVES     CN III, IV, VI   Pupils are equal, round, and reactive to light.     Significant Labs: All pertinent labs within the past 24 hours have been reviewed.  CBC:   Recent Labs   Lab 04/25/23  1722 04/25/23  1745   WBC 11.81  --    HGB 12.8  --    HCT 41.5 48     --      CMP:   Recent Labs   Lab 04/25/23  1832      K 3.3*      CO2 25   GLU 62*   BUN 14   CREATININE 1.0   CALCIUM 9.4   PROT 7.2   ALBUMIN 3.1*   BILITOT 0.4   ALKPHOS 91   AST 20   ALT 11   ANIONGAP 10     POCT Glucose:   Recent Labs   Lab 04/25/23  1959 04/25/23  2045 04/25/23  2258   POCTGLUCOSE 65* 108 187*       Significant Imaging: I have reviewed all pertinent imaging results/findings within the past 24 hours.

## 2023-04-26 NOTE — DISCHARGE SUMMARY
[facility] - Intensive Care ([place] [address])  [facility] Medicine  Discharge Summary      Patient Name: Henrietta Villasenor  MRN: [id number]  JOSSELYN: [id number]  Patient Class: OP- Observation  Admission Date: 4/25/2023  Hospital Length of Stay: 0 days  Discharge Date and Time:  04/26/2023 3:34 PM  Attending Physician: Thiago Painter MD   Discharging Provider: Thiago Painter MD  Primary Care Provider: Jeannine Arriaga MD  [facility] Medicine Team: [facility] Thiago Painter MD  Primary Care Team: [facility] MED Q    HPI:   Henrietta Villasenor is a 83 y.o. female with history of insulin-dependent diabetes, recently admitted to the hospital for acute kidney injury, obstructing kidney stone status post stenting, who presents from primary care clinic where she was having a follow-up visit and was noted to be diaphoretic weak and had a blood sugar of 39.  She reportedly gave herself lantus 28U (her normal dose) at 10:30 a.m. this morning,, and her blood sugar was 83 prior to insulin administration.  She reports she had part of a biscuit and eggs this morning. Felt well prior to clinic visit, granddaughter at bedside reports she seemed fatigued but not altered. She was given some juice at primary care clinic and had improvement in her blood sugar, presents now for further evaluation.    In the ED patient afebrile and hemodynamically stable saturating well on room air. Patient with persistent rebound hypoglycemia despite po and iv dextrose and started on D10 gtt continuous with stabilized and improving BG. Patient symptoms improved with BG control. She has difficulty reporting how she is taking her insulin at home but states that she took lantus 60 units last night and either took 28 units of lantus again this am or possibly took 28 units of her meal time insulin. During patient's very recent admission she was on 15 units basal insulin BID and moderate SSI with meals. Patient admitted to the care of medicine for further evaluation and  management.      * No surgery found *      Hospital Course:   See individual problem list.       Goals of Care Treatment Preferences:  Code Status: Full Code      Consults:     Cardiac/Vascular  Coronary artery disease involving native coronary artery of native heart with angina pectoris        Diastolic congestive heart failure  - stable  - holding home lasix at this time  - continue to monitor    Essential hypertension  - continue home amlodipine, coreg      Renal/  Hypokalemia        Hypomagnesemia        Stage 3a chronic kidney disease        Hematology  History of DVT (deep vein thrombosis)  - continue home Eliquis      Endocrine  * Hypoglycemia associated with diabetes  - secondary to unintentional insulin misuse. Patient recently admitted and BG controled with Basal 15 units BID and moderate SSI. By report seems patient took 60 units Basal last night and 28 units Basal again this am with breakfast.  - holding home insulins at this time  - D10 discontinued. BG remains in good range  - I educated patient regarding her home dose of insulin and appropriate use. Hold bolus insulin if not eating meal. Monitor BG closely at home and follow up with PCP and endocrinology outpatient.    Obesity (BMI 30.0-34.9)        Other  MARLON on CPAP  - CPAP QHS        Final Active Diagnoses:    Diagnosis Date Noted POA    PRINCIPAL PROBLEM:  Hypoglycemia associated with diabetes [E11.649] 04/26/2023 Yes    History of DVT (deep vein thrombosis) [Z86.718] 04/26/2023 Not Applicable    Hypomagnesemia [E83.42] 04/26/2023 Yes    Hypokalemia [E87.6] 04/26/2023 Yes    Obesity (BMI 30.0-34.9) [E66.9] 08/31/2016 Yes    Coronary artery disease involving native coronary artery of native heart with angina pectoris [I25.119] 05/14/2014 Yes    Stage 3a chronic kidney disease [N18.31] 05/14/2014 Yes    Diastolic congestive heart failure [I50.30] 09/12/2012 Yes    Essential hypertension [I10]  Yes     Chronic    MARLON on CPAP [G47.33,  Z99.89]  Not Applicable     Chronic      Problems Resolved During this Admission:       Discharged Condition: good    Disposition: Home or Self Care    Follow Up:    Patient Instructions:      Ambulatory referral/consult to Endocrinology   Standing Status: Future   Referral Priority: Routine Referral Type: Consultation   Requested Specialty: Endocrinology   Number of Visits Requested: 1     Ambulatory referral/consult to [facility]   Standing Status: Future   Referral Priority: Routine Referral Type: Consultation   Referral Reason: Specialty Services Required   Number of Visits Requested: 1     Diet diabetic     Diet Cardiac     Notify your health care provider if you experience any of the following:  persistent nausea and vomiting or diarrhea     Notify your health care provider if you experience any of the following:  severe uncontrolled pain     Notify your health care provider if you experience any of the following:  severe persistent headache     Notify your health care provider if you experience any of the following:  persistent dizziness, light-headedness, or visual disturbances     Notify your health care provider if you experience any of the following:  increased confusion or weakness     Activity as tolerated       Significant Diagnostic Studies: Labs: All labs within the past 24 hours have been reviewed    Pending Diagnostic Studies:     None         Medications:  Reconciled Home Medications:      Medication List      CHANGE how you take these medications    oxyCODONE-acetaminophen  mg per tablet  Commonly known as: PERCOCET  Take 1 tablet by mouth 5 (five) times daily.  Start taking on: May 5, 2023  What changed: Another medication with the same name was removed. Continue taking this medication, and follow the directions you see here.        CONTINUE taking these medications    ACCU-CHEK GUIDE TEST STRIPS Strp  Generic drug: blood sugar diagnostic  TEST BLOOD SUGAR  "THREE TIMES DAILY     ACCU-CHEK SOFT DEV LANCETS Kit  Generic drug: lancing device with lancets     albuterol 90 mcg/actuation inhaler  Commonly known as: PROVENTIL/VENTOLIN HFA  Inhale 2 puffs by mouth into the lungs every 4 (four) hours as needed for Wheezing. Rescue     amLODIPine 10 MG tablet  Commonly known as: NORVASC  Take 1 tablet (10 mg total) by mouth once daily.     apixaban 5 mg Tab  Commonly known as: ELIQUIS  Take 1 tablet (5 mg total) by mouth 2 (two) times daily.     * BD ULTRA-FINE OLU PEN NEEDLE 32 gauge x 5/32" Ndle  Generic drug: pen needle, diabetic  Uses 4 times daily, on multiple daily insulin injections     * DROPLET PEN NEEDLE 29 gauge x 1/2" Ndle  Generic drug: pen needle, diabetic  USE FOUR TIMES DAILY     blood-glucose meter kit  Use as instructed     carvediloL 25 MG tablet  Commonly known as: COREG  Take 1 tablet (25 mg total) by mouth 2 (two) times daily.     cholecalciferol (vitamin D3) 125 mcg (5,000 unit) capsule  Take 5,000 Units by mouth once daily.     DROPSAFE ALCOHOL PREP PADS Padm  Generic drug: alcohol swabs  USE TOPICALLY TO TEST BLOOD SUGAR FOUR TIMES DAILY     DULoxetine 60 MG capsule  Commonly known as: CYMBALTA  Take 1 capsule (60 mg total) by mouth 2 (two) times daily. Please hold until you follow up with your primary care provider     fluticasone propionate 50 mcg/actuation nasal spray  Commonly known as: FLONASE  Instill 2 sprays in each nostril once daily.     insulin aspart U-100 100 unit/mL (3 mL) Inpn pen  Commonly known as: NovoLOG  Inject 24 Units into the skin before breakfast AND 28 Units daily with lunch AND 28 Units before dinner.     lancets Misc  Commonly known as: ACCU-CHEK SOFTCLIX LANCETS  TEST BLOOD SUGAR FOUR TIMES DAILY     LANTUS SOLOSTAR U-100 INSULIN glargine 100 units/mL SubQ pen  Generic drug: insulin  Inject 60 Units into the skin once daily.     loratadine 10 mg tablet  Commonly known as: CLARITIN  Take 1 tablet (10 mg total) by mouth once " daily.     olopatadine 0.2 % Drop  Commonly known as: PATADAY  Place 1 drop into both eyes once daily.     pantoprazole 40 MG tablet  Commonly known as: PROTONIX  Take 1 tablet (40 mg total) by mouth once daily.     rOPINIRole 0.5 MG tablet  Commonly known as: REQUIP  Take 1 tablet (0.5 mg total) by mouth every evening.     triamcinolone acetonide 0.1% 0.1 % cream  Commonly known as: KENALOG  Apply topically 2 (two) times daily.         * This list has 2 medication(s) that are the same as other medications prescribed for you. Read the directions carefully, and ask your doctor or other care provider to review them with you.            STOP taking these medications    atorvastatin 40 MG tablet  Commonly known as: LIPITOR     furosemide 40 MG tablet  Commonly known as: LASIX     potassium chloride 10 MEQ Tbsr  Commonly known as: KLOR-CON            Indwelling Lines/Drains at time of discharge:   Lines/Drains/Airways     Drain  Duration           Female External Urinary Catheter 04/25/23 1713 <1 day                Time spent on the discharge of patient: 35 minutes         Thiago Painter MD  Department of Hospital Medicine  [facility] - Intensive Care ([place])

## 2023-04-26 NOTE — HPI
Henrietta Villasenor is a 83 y.o. female with history of insulin-dependent diabetes, recently admitted to the hospital for acute kidney injury, obstructing kidney stone status post stenting, who presents from primary care clinic where she was having a follow-up visit and was noted to be diaphoretic weak and had a blood sugar of 39.  She reportedly gave herself lantus 28U (her normal dose) at 10:30 a.m. this morning,, and her blood sugar was 83 prior to insulin administration.  She reports she had part of a biscuit and eggs this morning. Felt well prior to clinic visit, granddaughter at bedside reports she seemed fatigued but not altered. She was given some juice at primary care clinic and had improvement in her blood sugar, presents now for further evaluation.    In the ED patient afebrile and hemodynamically stable saturating well on room air. Patient with persistent rebound hypoglycemia despite po and iv dextrose and started on D10 gtt continuous with stabilized and improving BG. Patient symptoms improved with BG control. She has difficulty reporting how she is taking her insulin at home but states that she took lantus 60 units last night and either took 28 units of lantus again this am or possibly took 28 units of her meal time insulin. During patient's very recent admission she was on 15 units basal insulin BID and moderate SSI with meals. Patient admitted to the care of medicine for further evaluation and management.

## 2023-04-26 NOTE — PLAN OF CARE
[facility] - Int[facility] Care ([facility])  Discharge Final Note    Primary Care Provider: Jeannine Arriaga MD    Expected Discharge Date: 4/26/2023    Final Discharge Note (most recent)       Final Note - 04/26/23 1344          Final Note    Assessment Type Final Discharge Note (P)      Anticipated Discharge Disposition Home or Self Care (P)                      Important Message from Medicare           Patient discharging home today with no post acute needs.      Future Appointments   Date Time Provider Department Center   4/27/2023 11:30 AM DIETITIAN, GENERAL INT MED [facility] NUTRI [facility]   5/2/2023  9:00 AM Laz Rudolph Jr., MD [facility] UROLOGY [facility]   5/3/2023  2:00 PM Shan Barrow MD [facility] PCW   6/8/2023 12:00 PM Jeannine Arriaga MD [facility]   7/12/2023 11:00 AM Georgina Clemens, ELVIRA [facility] OPTICLB [facility] LMSW  [facility]   [id number]

## 2023-04-26 NOTE — PLAN OF CARE
Pt arrived to floor around 0300. Aox4 and follows commands. Denies pain at this time. Blood glucose 154 upon arrival to the unit. No skin break down. Cane and purse at bedside. 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 Room air. Q2H accucheck per order. Pt states last BM was 4/25/23. Purewick in place. Denies SOB, CP, and distress. Bed in lowest position and call light within reach.

## 2023-04-26 NOTE — H&P
"[facility] - Emergency Dept  [facility] Medicine  History & Physical    Patient Name: Henrietta Villasenor  MRN: [id number]  Patient Class: OP- Observation  Admission Date: 4/25/2023  Attending Physician: Timbo Lim MD   Primary Care Provider: Jeannine Arriaga MD         Patient information was obtained from patient, past medical records and ER records.     Subjective:     Principal Problem:Hypoglycemia associated with diabetes    Chief Complaint:   Chief Complaint   Patient presents with    Hypoglycemia     Pt presents from clinic across the street for hypoglycemia (CBG 30). Pt reports self-administering unknown amount of insulin this morning w/ a sudden drop in blood glucose and becoming cool/clammy. Given two apple juices w/ new CBG of 65. Pt states " I still feel real weak."        HPI: Henrietta Villasenor is a 83 y.o. female with history of insulin-dependent diabetes, recently admitted to the hospital for acute kidney injury, obstructing kidney stone status post stenting, who presents from primary care clinic where she was having a follow-up visit and was noted to be diaphoretic weak and had a blood sugar of 39.  She reportedly gave herself lantus 28U (her normal dose) at 10:30 a.m. this morning,, and her blood sugar was 83 prior to insulin administration.  She reports she had part of a biscuit and eggs this morning. Felt well prior to clinic visit, granddaughter at bedside reports she seemed fatigued but not altered. She was given some juice at primary care clinic and had improvement in her blood sugar, presents now for further evaluation.    In the ED patient afebrile and hemodynamically stable saturating well on room air. Patient with persistent rebound hypoglycemia despite po and iv dextrose and started on D10 gtt continuous with stabilized and improving BG. Patient symptoms improved with BG control. She has difficulty reporting how she is taking her insulin at home but states that she took lantus 60 units last " night and either took 28 units of lantus again this am or possibly took 28 units of her meal time insulin. During patient's very recent admission she was on 15 units basal insulin BID and moderate SSI with meals. Patient admitted to the care of medicine for further evaluation and management.      Past Medical History:   Diagnosis Date    Abnormality of lung 11/08/2011    Stable bandlike opacities at the lung bases, most likely representing      Anxiety     Arthritis     CAD (coronary artery disease)     Calculus of ureter 2/22/2011    CHF (congestive heart failure)     Chronic back pain 7/29/2012    Colon polyp 9/2010; 11/2010    Colon polyps 7/29/2012    Coronary artery disease involving native coronary artery of native heart with angina pectoris     Depression     Diabetes mellitus     Diabetes mellitus type II     Diverticulitis large intestine 7/27/2015    Diverticulitis of large intestine without perforation or abscess without bleeding     Diverticulosis 09/25/2010; 11/02/2010; 11/08/2011; 7/29/2012    Duodenal disorder 08/25/2011    Duodenal erosion noted on EGD.    Duodenal ulcer, unspecified as acute or chronic, without hemorrhage, perforation, or obstruction 8/24/2011    E. coli sepsis 12/2010    Due to left ureteral stone with left nephrostomy tube - hospitalized in [place]    Esophageal dysmotility 01/24/2012    Noted on upper GI-barium swallow.    Facial weakness 1969    Left facial weakness s/p left mastoidectomy in ~ 1969.    Fatty liver 11/08/2011    Reported on CT-abdomen and in 06/2012 Gastro clinic visit note.    Gastric polyp 09/29/2010    GERD (gastroesophageal reflux disease) 7/29/2012    Hepatomegaly 11/08/2011    Reported on CT-abdomen    Herpes zoster with other nervous system complications(053.19) 2/28/2011    Hiatal hernia 06/26/2006; 09/29/2010; 08/25/2011    Noted on barium swallow 2006; noted on  EGD 2011.   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    Trouble in sleeping     Type II or unspecified type diabetes mellitus with neurological manifestations, not stated as uncontrolled(250.60)     Type II or unspecified type diabetes mellitus with peripheral circulatory disorders, not stated as uncontrolled(250.70)     Type II or unspecified type diabetes mellitus with renal manifestations, not stated as uncontrolled(250.40)        Past Surgical History:   Procedure Laterality Date    BELPHAROPTOSIS REPAIR      s/p LAMBERTO levator repair - Dr. Dejesus    CARDIAC CATHETERIZATION      CARPAL TUNNEL RELEASE  3/13/2012    CATARACT EXTRACTION W/  INTRAOCULAR LENS IMPLANT Right 10/31/2018        CATARACT EXTRACTION W/  INTRAOCULAR LENS IMPLANT Left 03/22/2018        CHOLECYSTECTOMY      COLONOSCOPY N/A 11/16/2016    Procedure: COLONOSCOPY;  Surgeon: Chris Storey MD;  Location: [address]);  Service: Endoscopy;  Laterality: N/A;    COLONOSCOPY N/A 6/14/2017    Procedure: COLONOSCOPY;  Surgeon: Chris Storey MD;  Location: [address]);  Service: Endoscopy;  Laterality: N/A;  colonoscopy in 3 months with better bowel prep. - Split PEG prep ordered    CYSTOSCOPY  4/19/2023    Procedure:  CYSTOSCOPY;  Surgeon: Domo Bates MD;  Location: [facility] OR 1ST FLR;  Service: Urology;;    ESOPHAGOGASTRODUODENOSCOPY N/A 11/10/2021    Procedure: EGD (ESOPHAGOGASTRODUODENOSCOPY);  Surgeon: Kuldeep Velázquez MD;  Location: [facility] ENDO (4TH FLR);  Service: Endoscopy;  Laterality: N/A;  11/4-eliquis hold ok see te -tb-fully vacc-inst mail and verbal-    EXTRACORPOREAL SHOCK WAVE LITHOTRIPSY      INJECTION OF ANESTHETIC AGENT AROUND NERVE Bilateral 6/24/2020    Procedure: BLOCK, NERVE, C4-C5-C6 MEDIAL BRANCH ok per Talia to add on;  Surgeon: Oscar Ravi MD;  Location: [facility] PAIN MGT;  Service: Pain Management;  Laterality: Bilateral;    INTRAOCULAR PROSTHESES INSERTION Right 10/31/2018    Procedure: INSERTION-INTRAOCULAR LENS (IOL);  Surgeon: Keysha Valle MD;  Location: [facility] OR;  Service: Ophthalmology;  Laterality: Right;    JOINT REPLACEMENT      mastoid tumor removal  1969    Left mastoidectomy with residual left facial weakness.    NEPHROSTOMY      OOPHORECTOMY      PHACOEMULSIFICATION OF CATARACT Right 10/31/2018    Procedure: PHACOEMULSIFICATION-ASPIRATION-CATARACT;  Surgeon: Keysha Valle MD;  Location: [facility];  Service: Ophthalmology;  Laterality: Right;    RETROGRADE PYELOGRAPHY Right 4/19/2023    Procedure: PYELOGRAM, RETROGRADE;  Surgeon: Domo Bates MD;  Location: [facility] OR 1ST FLR;  Service: Urology;  Laterality: Right;    TOTAL ABDOMINAL HYSTERECTOMY  1969    TAHBSO    TOTAL KNEE ARTHROPLASTY  9/13/2012    Left    TRANSFORAMINAL EPIDURAL INJECTION OF STEROID Bilateral 9/17/2019    Procedure: INJECTION, STEROID, EPIDURAL, TRANSFORAMINAL APPROACH;  Surgeon: Oscar Ravi MD;  Location: [facility] PAIN MGT;  Service: Pain Management;  Laterality: Bilateral;  B/L TFESI L4/L5    TRANSFORAMINAL EPIDURAL INJECTION OF STEROID Bilateral 10/2/2020    Procedure: INJECTION, STEROID, EPIDURAL, TRANSFORAMINAL APPROACH;  Surgeon: Oscar Ravi MD;  Location: [facility] PAIN MGT;  Service:  "Pain Management;  Laterality: Bilateral;  B/L TFESI L4/5    TRANSFORAMINAL EPIDURAL INJECTION OF STEROID Bilateral 5/7/2021    Procedure: INJECTION, STEROID, EPIDURAL, TRANSFORAMINAL APPROACH, L4-L5 clear to hold Eliquis 3 days;  Surgeon: Oscar Ravi MD;  Location: [facility] PAIN MGT;  Service: Pain Management;  Laterality: Bilateral;    URETERAL STENT PLACEMENT Right 4/19/2023    Procedure: INSERTION, STENT, URETER;  Surgeon: Domo Bates MD;  Location: [facility] OR [address] [place] [address];  Service: Urology;  Laterality: Right;       Review of patient's allergies indicates:   Allergen Reactions    Crestor [rosuvastatin]      Cramping in legs    Ezetimibe Diarrhea     Other reaction(s): abdominal pain, Diarrhea      Hydrocodone Itching    Lisinopril      Other reaction(s): cough      Sulfa (sulfonamide antibiotics) Itching and Rash           Sulfamethoxazole     Sulfamethoxazole-trimethoprim     Valsartan Swelling       No current facility-administered medications on file prior to encounter.     Current Outpatient Medications on File Prior to Encounter   Medication Sig    carvediloL (COREG) 25 MG tablet Take 1 tablet (25 mg total) by mouth 2 (two) times daily.    ACCU-CHEK GUIDE TEST STRIPS Strp TEST BLOOD SUGAR THREE TIMES DAILY    ACCU-CHEK SOFT DEV LANCETS Kit     albuterol (PROVENTIL/VENTOLIN HFA) 90 mcg/actuation inhaler Inhale 2 puffs by mouth into the lungs every 4 (four) hours as needed for Wheezing. Rescue    amLODIPine (NORVASC) 10 MG tablet Take 1 tablet (10 mg total) by mouth once daily.    apixaban (ELIQUIS) 5 mg Tab Take 1 tablet (5 mg total) by mouth 2 (two) times daily.    atorvastatin (LIPITOR) 40 MG tablet Take 1 tablet (40 mg total) by mouth once daily. (Patient not taking: Reported on 4/25/2023)    BD ULTRA-FINE OLU PEN NEEDLE 32 gauge x 5/32" Ndle Uses 4 times daily, on multiple daily insulin injections    blood-glucose meter kit Use as instructed    cholecalciferol, vitamin D3, 125 mcg " "(5,000 unit) capsule Take 5,000 Units by mouth once daily.    DROPLET PEN NEEDLE 29 gauge x 1/2" Ndle USE FOUR TIMES DAILY    DROPSAFE ALCOHOL PREP PADS PadM USE TOPICALLY TO TEST BLOOD SUGAR FOUR TIMES DAILY    DULoxetine (CYMBALTA) 60 MG capsule Take 1 capsule (60 mg total) by mouth 2 (two) times daily. Please hold until you follow up with your primary care provider    fluticasone propionate (FLONASE) 50 mcg/actuation nasal spray Instill 2 sprays in each nostril once daily.    furosemide (LASIX) 40 MG tablet Please hold until you follow up with your primary care provider    insulin (LANTUS SOLOSTAR U-100 INSULIN) glargine 100 units/mL (3mL) SubQ pen Inject 60 Units into the skin once daily.    insulin aspart U-100 (NOVOLOG) 100 unit/mL (3 mL) InPn pen Inject 24 Units into the skin before breakfast AND 28 Units daily with lunch AND 28 Units before dinner.    lancets (ACCU-CHEK SOFTCLIX LANCETS) Misc TEST BLOOD SUGAR FOUR TIMES DAILY    loratadine (CLARITIN) 10 mg tablet Take 1 tablet (10 mg total) by mouth once daily.    olopatadine (PATADAY) 0.2 % Drop Place 1 drop into both eyes once daily.    [START ON 5/5/2023] oxyCODONE-acetaminophen (PERCOCET)  mg per tablet Take 1 tablet by mouth 5 (five) times daily.    oxyCODONE-acetaminophen (PERCOCET)  mg per tablet Take 1 tablet by mouth 5 (five) times daily.    oxyCODONE-acetaminophen (PERCOCET)  mg per tablet Take 1 tablet by mouth 5 (five) times daily.    pantoprazole (PROTONIX) 40 MG tablet Take 1 tablet (40 mg total) by mouth once daily.    potassium chloride (KLOR-CON) 10 MEQ TbSR Take 1 tablet (10 mEq total) by mouth once daily. Please hold until you follow up with your primary care provider    rOPINIRole (REQUIP) 0.5 MG tablet Take 1 tablet (0.5 mg total) by mouth every evening.    triamcinolone acetonide 0.1% (KENALOG) 0.1 % cream Apply topically 2 (two) times daily.     Family History       Problem Relation (Age of Onset)    " Alcohol abuse Brother    Breast cancer Sister, Maternal Aunt    Cancer Mother, Sister    Cirrhosis Brother    Dementia Brother, Brother    Diabetes Sister, Sister, Sister, Sister, Brother, Other    Drug abuse Brother, Brother    Frontotemporal dementia Brother    Heart attack Father, Sister    Heart disease Father    Hypertension Mother    Liver cancer Brother    Lupus Brother, Sister    No Known Problems Daughter, Son, Daughter, Son, Son, Maternal Grandmother, Maternal Grandfather, Paternal Grandmother, Paternal Grandfather, Maternal Uncle, Paternal Aunt, Paternal Uncle    Ovarian cancer Other    Sleep apnea Sister          Tobacco Use    Smoking status: Former     Packs/day: 1.00     Years: 15.00     Pack years: 15.00     Types: Cigarettes     Quit date: 2000     Years since quittin.7    Smokeless tobacco: Never   Substance and Sexual Activity    Alcohol use: No    Drug use: No    Sexual activity: Never     Review of Systems   Constitutional:  Positive for fatigue. Negative for chills and fever.   HENT:  Negative for sore throat and trouble swallowing.    Eyes:  Negative for photophobia and visual disturbance.   Respiratory:  Negative for cough, shortness of breath and wheezing.    Cardiovascular:  Negative for chest pain, palpitations and leg swelling.   Gastrointestinal:  Negative for abdominal distention, abdominal pain, diarrhea, nausea and vomiting.   Genitourinary:  Negative for dysuria and hematuria.   Musculoskeletal:  Negative for neck pain and neck stiffness.   Skin:  Negative for rash and wound.   Neurological:  Negative for seizures, syncope, weakness, numbness and headaches.   Psychiatric/Behavioral:  Negative for confusion and decreased concentration.    Objective:     Vital Signs (Most Recent):  Temp: 97.8 °F (36.6 °C) (23)  Pulse: 71 (23)  Resp: 18 (23)  BP: 129/68 (23)  SpO2: 96 % (23) Vital Signs (24h Range):  Temp:  [97.4 °F  (36.3 °C)-97.8 °F (36.6 °C)] 97.8 °F (36.6 °C)  Pulse:  [63-83] 71  Resp:  [17-20] 18  SpO2:  [93 %-100 %] 96 %  BP: (129-195)/(60-97) 129/68     Weight: 90.7 kg (200 lb)  Body mass index is 30.41 kg/m².    Physical Exam  Constitutional:       General: She is not in acute distress.     Appearance: She is not toxic-appearing or diaphoretic.   HENT:      Head: Normocephalic and atraumatic.      Nose: Nose normal.   Eyes:      General: No scleral icterus.     Extraocular Movements: Extraocular movements intact.      Pupils: Pupils are equal, round, and reactive to light.   Cardiovascular:      Rate and Rhythm: Normal rate and regular rhythm.   Pulmonary:      Effort: Pulmonary effort is normal. No respiratory distress.      Breath sounds: No wheezing or rales.   Abdominal:      General: Abdomen is flat. There is no distension.      Palpations: Abdomen is soft.      Tenderness: There is no abdominal tenderness. There is no guarding.   Musculoskeletal:         General: Normal range of motion.      Cervical back: Normal range of motion and neck supple. No rigidity.      Right lower leg: No edema.      Left lower leg: No edema.   Skin:     General: Skin is warm and dry.      Coloration: Skin is not jaundiced.   Neurological:      General: No focal deficit present.      Mental Status: She is alert and oriented to person, place, and time.      Cranial Nerves: No cranial nerve deficit.   Psychiatric:         Mood and Affect: Mood normal.         Behavior: Behavior normal.         CRANIAL NERVES     CN III, IV, VI   Pupils are equal, round, and reactive to light.     Significant Labs: All pertinent labs within the past 24 hours have been reviewed.  CBC:   Recent Labs   Lab 04/25/23  1722 04/25/23  1745   WBC 11.81  --    HGB 12.8  --    HCT 41.5 48     --      CMP:   Recent Labs   Lab 04/25/23  1832      K 3.3*      CO2 25   GLU 62*   BUN 14   CREATININE 1.0   CALCIUM 9.4   PROT 7.2   ALBUMIN 3.1*   BILITOT  0.4   ALKPHOS 91   AST 20   ALT 11   ANIONGAP 10     POCT Glucose:   Recent Labs   Lab 04/25/23 1959 04/25/23 2045 04/25/23  2258   POCTGLUCOSE 65* 108 187*       Significant Imaging: I have reviewed all pertinent imaging results/findings within the past 24 hours.    Assessment/Plan:     * Hypoglycemia associated with diabetes  - secondary to unintentional insulin misuse. Patient recently admitted and BG controled with Basal 15 units BID and moderate SSI. By report seems patient took 60 units Basal last night and 28 units Basal again this am with breakfast.  - holding home insulins at this time  - continue D10 75ml/hr continuous  - Accuchecks q2h  - further management pending clinical course and future study review    History of DVT (deep vein thrombosis)  - continue home Eliquis      Diastolic congestive heart failure  - stable  - holding home lasix at this time  - continue to monitor    MARLON on CPAP  - CPAP QHS      Essential hypertension  - continue home amlodipine, coreg        VTE Risk Mitigation (From admission, onward)         Ordered     apixaban tablet 5 mg  2 times daily         04/25/23 2056                   On 04/26/2023, patient should be placed in hospital observation services under my care.        Timbo Lim MD  Department of Hospital Medicine  [facility] - Emergency Dept

## 2023-04-26 NOTE — ASSESSMENT & PLAN NOTE
- secondary to unintentional insulin misuse. Patient recently admitted and BG controled with Basal 15 units BID and moderate SSI. By report seems patient took 60 units Basal last night and 28 units Basal again this am with breakfast.  - holding home insulins at this time  - continue D10 75ml/hr continuous  - Accuchecks q2h  - further management pending clinical course and future study review

## 2023-04-26 NOTE — PLAN OF CARE
[facility] ([place] [address])  Initial Discharge Assessment       Primary Care Provider: Jeannine Arriaga MD    Admission Diagnosis: Hypoglycemia [E16.2]  Chest pain [R07.9]    Admission Date: 4/25/2023  Expected Discharge Date: 4/26/2023    Discharge Barriers Identified: (P) None    Payor: HU[employer] MANAGED MEDICARE / Plan: HU[employer] SNP HMO PPO SPECIAL NEEDS / Product Type: Medicare Advantage /     Extended Emergency Contact Information  Primary Emergency Contact: Kacie Mast  Address: UNKNOWN           [place], [place] [id number] [place] [address]  Mobile Phone: [phone]  Relation: Relative  Preferred language: English    Discharge Plan A: (P) Home with family  Discharge Plan B: (P) Home Health      [facility]  [address]  [facility] [address]  Phone: [phone] Fax: [phone]    [id number] - [place], [place] - [address] AT [facility] [address]  [address]  [facility] [address]  Phone: [phone] Fax: [phone]    [facility] - [place], [place] - [address] [facility]  [address] [facility] [address]  Phone: [phone] Fax: [phone]    [facility][id number] - [place], [place] - [address]  [address]  [place] [address]  Phone: [phone] Fax: [phone]      Initial Assessment (most recent)       Adult Discharge Assessment - 04/26/23 1336          Discharge Assessment    Assessment Type Discharge Planning Assessment (P)      Confirmed/corrected address, phone number and insurance Yes (P)      Confirmed Demographics Correct on Facesheet (P)      Source of Information patient (P)      Does patient/caregiver understand observation status Yes (P)      Communicated KELLY with patient/caregiver Yes (P)      People in Home alone (P)      Facility Arrived From: Wellness appt (P)      Do you expect to return to your current living situation? Yes (P)      Do you have help at  home or someone to help you manage your care at home? Yes (P)      Who are your caregiver(s) and their phone number(s)? sary Julio, [phone] (P)      Prior to hospitilization cognitive status: Alert/Oriented (P)      Current cognitive status: Alert/Oriented (P)      Home Layout Able to live on 1st floor (P)      Equipment Currently Used at Home none (P)      Readmission within 30 days? No (P)      Patient currently being followed by outpatient case management? No (P)      Do you currently have service(s) that help you manage your care at home? No (P)      Do you take prescription medications? Yes (P)      Do you have prescription coverage? Yes (P)      Coverage [employer] Managed Medicare (P)      Do you have any problems affording any of your prescribed medications? No (P)      Is the patient taking medications as prescribed? yes (P)      Who is going to help you get home at discharge? sary Julio, [phone] (P)      How do you get to doctors appointments? car, drives self;family or friend will provide (P)      Are you on dialysis? No (P)      Do you take coumadin? No (P)      Discharge Plan A Home with family (P)      Discharge Plan B Home Health (P)      DME Needed Upon Discharge  none (P)      Discharge Plan discussed with: Patient (P)      Discharge Barriers Identified None (P)         Physical Activity    On average, how many days per week do you engage in moderate to strenuous exercise (like a brisk walk)? 5 days (P)      On average, how many minutes do you engage in exercise at this level? 10 min (P)         Financial Resource Strain    How hard is it for you to pay for the very basics like food, housing, medical care, and heating? Somewhat hard (P)         Housing Stability    In the last 12 months, was there a time when you were not able to pay the mortgage or rent on time? No (P)      In the last 12 months, was there a time when you did not have a steady place to sleep or slept in a shelter  (including now)? No (P)         Transportation Needs    In the past 12 months, has lack of transportation kept you from medical appointments or from getting medications? No (P)      In the past 12 months, has lack of transportation kept you from meetings, work, or from getting things needed for daily living? No (P)         Food Insecurity    Within the past 12 months, you worried that your food would run out before you got the money to buy more. Never true (P)      Within the past 12 months, the food you bought just didn't last and you didn't have money to get more. Never true (P)         Stress    Do you feel stress - tense, restless, nervous, or anxious, or unable to sleep at night because your mind is troubled all the time - these days? Only a little (P)         Social Connections    In a typical week, how many times do you talk on the phone with family, friends, or neighbors? Never (P)      How often do you get together with friends or relatives? Never (P)      How often do you attend Advent or Holiness services? Never (P)      Do you belong to any clubs or organizations such as Advent groups, unions, fraternal or athletic groups, or school groups? No (P)      How often do you attend meetings of the clubs or organizations you belong to? Never (P)         Alcohol Use    Q1: How often do you have a drink containing alcohol? Never (P)      Q2: How many drinks containing alcohol do you have on a typical day when you are drinking? Patient does not drink (P)      Q3: How often do you have six or more drinks on one occasion? Never (P)                         Claudine Rosenberg LMSW  [facility]   [id number]

## 2023-04-27 ENCOUNTER — NUTRITION (OUTPATIENT)
Dept: NUTRITION | Facility: CLINIC | Age: 83
End: 2023-04-27
Payer: MEDICARE

## 2023-04-27 VITALS — HEIGHT: 68 IN | BODY MASS INDEX: 31.14 KG/M2 | WEIGHT: 205.5 LBS

## 2023-04-27 DIAGNOSIS — Z71.3 DIETARY COUNSELING: ICD-10-CM

## 2023-04-27 DIAGNOSIS — E11.42 TYPE 2 DIABETES MELLITUS WITH DIABETIC POLYNEUROPATHY, WITH LONG-TERM CURRENT USE OF INSULIN: Primary | ICD-10-CM

## 2023-04-27 DIAGNOSIS — Z79.4 TYPE 2 DIABETES MELLITUS WITH DIABETIC POLYNEUROPATHY, WITH LONG-TERM CURRENT USE OF INSULIN: Primary | ICD-10-CM

## 2023-04-27 PROCEDURE — 97802 MEDICAL NUTRITION INDIV IN: CPT | Mod: HCNC,S$GLB,, | Performed by: DIETITIAN, REGISTERED

## 2023-04-27 PROCEDURE — 99999 PR PBB SHADOW E&M-EST. PATIENT-LVL IV: ICD-10-PCS | Mod: PBBFAC,HCNC,,

## 2023-04-27 PROCEDURE — 97802 PR MED NUTR THER, 1ST, INDIV, EA 15 MIN: ICD-10-PCS | Mod: HCNC,S$GLB,, | Performed by: DIETITIAN, REGISTERED

## 2023-04-27 PROCEDURE — 99999 PR PBB SHADOW E&M-EST. PATIENT-LVL IV: CPT | Mod: PBBFAC,HCNC,,

## 2023-04-27 NOTE — PROGRESS NOTES
"Referring Physician:Jeannine Arriaga MD     Reason for visit:  Chief Complaint   Patient presents with    Diabetes    Nutrition Counseling      Initial Visit    :1940     Allergies Reviewed  Meds Reviewed    Anthropometrics  Weight:93.2 kg (205 lb 7.5 oz)  Height:5' 8" (1.727 m)  BMI:Body mass index is 31.24 kg/m².   IBW: 63.9 kg  +/-10%    Meds:  Outpatient Medications Prior to Visit   Medication Sig Dispense Refill    ACCU-CHEK GUIDE TEST STRIPS Strp TEST BLOOD SUGAR THREE TIMES DAILY 300 strip 3    ACCU-CHEK SOFT DEV LANCETS Kit       albuterol (PROVENTIL/VENTOLIN HFA) 90 mcg/actuation inhaler Inhale 2 puffs by mouth into the lungs every 4 (four) hours as needed for Wheezing. Rescue 25.5 g 3    amLODIPine (NORVASC) 10 MG tablet Take 1 tablet (10 mg total) by mouth once daily. 30 tablet 0    apixaban (ELIQUIS) 5 mg Tab Take 1 tablet (5 mg total) by mouth 2 (two) times daily. 60 tablet 0    BD ULTRA-FINE OLU PEN NEEDLE 32 gauge x 5/32" Ndle Uses 4 times daily, on multiple daily insulin injections 130 each 12    blood-glucose meter kit Use as instructed 1 each 0    carvediloL (COREG) 25 MG tablet Take 1 tablet (25 mg total) by mouth 2 (two) times daily. 60 tablet 0    cholecalciferol, vitamin D3, 125 mcg (5,000 unit) capsule Take 5,000 Units by mouth once daily.      DROPLET PEN NEEDLE 29 gauge x 1/2" Ndle USE FOUR TIMES DAILY 400 each 3    DROPSAFE ALCOHOL PREP PADS PadM USE TOPICALLY TO TEST BLOOD SUGAR FOUR TIMES DAILY 400 each 3    DULoxetine (CYMBALTA) 60 MG capsule Take 1 capsule (60 mg total) by mouth 2 (two) times daily. Please hold until you follow up with your primary care provider 180 capsule 1    fluticasone propionate (FLONASE) 50 mcg/actuation nasal spray Instill 2 sprays in each nostril once daily. 16 g 1    insulin (LANTUS SOLOSTAR U-100 INSULIN) glargine 100 units/mL (3mL) SubQ pen Inject 60 Units into the skin once daily. 60 mL 6    insulin aspart U-100 (NOVOLOG) 100 unit/mL (3 mL) InPn " "pen Inject 24 Units into the skin before breakfast AND 28 Units daily with lunch AND 28 Units before dinner. 120 mL 3    lancets (ACCU-CHEK SOFTCLIX LANCETS) Misc TEST BLOOD SUGAR FOUR TIMES DAILY 400 each 3    loratadine (CLARITIN) 10 mg tablet Take 1 tablet (10 mg total) by mouth once daily. 30 tablet 4    olopatadine (PATADAY) 0.2 % Drop Place 1 drop into both eyes once daily. 2.5 mL 0    [START ON 5/5/2023] oxyCODONE-acetaminophen (PERCOCET)  mg per tablet Take 1 tablet by mouth 5 (five) times daily. 150 tablet 0    pantoprazole (PROTONIX) 40 MG tablet Take 1 tablet (40 mg total) by mouth once daily. 90 tablet 1    rOPINIRole (REQUIP) 0.5 MG tablet Take 1 tablet (0.5 mg total) by mouth every evening. 30 tablet 0    triamcinolone acetonide 0.1% (KENALOG) 0.1 % cream Apply topically 2 (two) times daily. 80 g 0     No facility-administered medications prior to visit.       Food/Drug Interactions Noted:  n/a    Vitamins/Supplements/Herbs:  Vit D    Labs:  HgbA1c  8.2     Nutrition Prescription: 1917 Kcals/day( 30 kcal/kg IBW),  64 g protein( 1.0 g/kg IBW)     Support System:  pt states she does her own grocery shopping and cooking.  Grandson is "in and out"    Diet Hx:  pt discharged from [facility] yesterday after hospitalization for hypoglycemia.       Breakfast: this morning:  biscuit, sausage, scrambled egg, fruit cup, [place] low sugar lemonade  Lunch:   in the hospital yesterday:  meat, mac & cheese, green peas, fruit cup, small Diet Coke  Dinner:   last night at home:  rice, chicken, sweet bottled tea    Current activity level and/or physical limitations:  pt using cane    Motivation to make changes/anticipated barriers and/or expected adherence:  no change in current diet regimen expected; pt states she tries hard to take care of herself    Nutrition-Focus Physical Findings:  pt appears well nourished    Assessment:  pt attentive during discussion of foods/beverages recommended and to avoid; reading food " labels; sample meal plan and menus; healthy snacks; portion control.  Pt brought a big bag of pill bottles with her, and asked me to help her figure out how she should be taking them; apparently she had some medicine changes during hospital admission.  I explained to her that I was not the person who could help her with that; I did message her PCP Dr Arriaga and SCOTT Washington, of patient's request and apparent confusion of how to take her medicines.      Nutrition Diagnosis:  Food- and nutrition-related knowledge deficit RT lack of prior exposure to information AEB dx diabetes/hypoglycemia    Recommendations:  Carbohydrate controlled diet.  Do not skip meals.  Include between meal snacks.  Handout provided & reviewed:  Diabetes Management Guide; My Plate Planner    Strategies Implemented:  do not skip meals; include snacks between meals    Consultation Time:30 minutes.  Communicated with referring healthcare provider:  Consult note available in pt's Epic chart per MD discretion  Follow Up:  Pt provided with dietitian contact number and advised to call with questions or make future appointment if further intervention needed.

## 2023-04-28 ENCOUNTER — TELEPHONE (OUTPATIENT)
Dept: INTERNAL MEDICINE | Facility: CLINIC | Age: 83
End: 2023-04-28
Payer: MEDICARE

## 2023-04-28 DIAGNOSIS — I50.32 CHRONIC DIASTOLIC CONGESTIVE HEART FAILURE: Primary | ICD-10-CM

## 2023-04-28 NOTE — TELEPHONE ENCOUNTER
Left message for patient at [phone].    Please continue to try to get in touch with patient to review her medications with her- have her tell you the names on her medicine bottles.

## 2023-04-30 NOTE — PROGRESS NOTES
I have reviewed and concur with the resident's history, physical, assessment, and plan.  I have personally interviewed and examined the patient in resident clinic.  Pt. With multiple medical problems and co morbidities who is seeing in urgently in clinic with complains of abdominal pain, weakness and constipation.  Agree with sending patient to ED for further imaging and evaluation.

## 2023-05-01 ENCOUNTER — TELEPHONE (OUTPATIENT)
Dept: INTERNAL MEDICINE | Facility: CLINIC | Age: 83
End: 2023-05-01
Payer: MEDICARE

## 2023-05-02 ENCOUNTER — OFFICE VISIT (OUTPATIENT)
Dept: UROLOGY | Facility: CLINIC | Age: 83
End: 2023-05-02
Payer: MEDICARE

## 2023-05-02 ENCOUNTER — TELEPHONE (OUTPATIENT)
Dept: INTERNAL MEDICINE | Facility: CLINIC | Age: 83
End: 2023-05-02

## 2023-05-02 ENCOUNTER — OFFICE VISIT (OUTPATIENT)
Dept: INTERNAL MEDICINE | Facility: CLINIC | Age: 83
End: 2023-05-02
Payer: MEDICARE

## 2023-05-02 ENCOUNTER — LAB VISIT (OUTPATIENT)
Dept: LAB | Facility: HOSPITAL | Age: 83
End: 2023-05-02
Payer: MEDICARE

## 2023-05-02 VITALS
DIASTOLIC BLOOD PRESSURE: 60 MMHG | HEART RATE: 65 BPM | SYSTOLIC BLOOD PRESSURE: 120 MMHG | WEIGHT: 209.88 LBS | HEIGHT: 68 IN | BODY MASS INDEX: 31.81 KG/M2 | OXYGEN SATURATION: 97 %

## 2023-05-02 VITALS
WEIGHT: 205 LBS | SYSTOLIC BLOOD PRESSURE: 91 MMHG | DIASTOLIC BLOOD PRESSURE: 59 MMHG | HEART RATE: 65 BPM | HEIGHT: 68 IN | BODY MASS INDEX: 31.07 KG/M2

## 2023-05-02 DIAGNOSIS — N17.9 AKI (ACUTE KIDNEY INJURY): ICD-10-CM

## 2023-05-02 DIAGNOSIS — E11.42 TYPE 2 DIABETES MELLITUS WITH DIABETIC POLYNEUROPATHY, WITH LONG-TERM CURRENT USE OF INSULIN: ICD-10-CM

## 2023-05-02 DIAGNOSIS — Z79.4 TYPE 2 DIABETES MELLITUS WITH DIABETIC POLYNEUROPATHY, WITH LONG-TERM CURRENT USE OF INSULIN: ICD-10-CM

## 2023-05-02 DIAGNOSIS — I10 ESSENTIAL HYPERTENSION: Chronic | ICD-10-CM

## 2023-05-02 DIAGNOSIS — N20.1 URETERAL CALCULUS: Primary | ICD-10-CM

## 2023-05-02 DIAGNOSIS — N18.31 STAGE 3A CHRONIC KIDNEY DISEASE: ICD-10-CM

## 2023-05-02 DIAGNOSIS — E11.649 HYPOGLYCEMIA ASSOCIATED WITH DIABETES: ICD-10-CM

## 2023-05-02 DIAGNOSIS — I25.119 CORONARY ARTERY DISEASE INVOLVING NATIVE CORONARY ARTERY OF NATIVE HEART WITH ANGINA PECTORIS: ICD-10-CM

## 2023-05-02 DIAGNOSIS — N17.9 AKI (ACUTE KIDNEY INJURY): Primary | ICD-10-CM

## 2023-05-02 DIAGNOSIS — Z86.718 HISTORY OF DVT (DEEP VEIN THROMBOSIS): ICD-10-CM

## 2023-05-02 DIAGNOSIS — E78.2 MIXED HYPERLIPIDEMIA: ICD-10-CM

## 2023-05-02 LAB
ANION GAP SERPL CALC-SCNC: 6 MMOL/L (ref 8–16)
BUN SERPL-MCNC: 13 MG/DL (ref 8–23)
CALCIUM SERPL-MCNC: 10 MG/DL (ref 8.7–10.5)
CHLORIDE SERPL-SCNC: 103 MMOL/L (ref 95–110)
CO2 SERPL-SCNC: 30 MMOL/L (ref 23–29)
CREAT SERPL-MCNC: 1.1 MG/DL (ref 0.5–1.4)
EST. GFR  (NO RACE VARIABLE): 49.9 ML/MIN/1.73 M^2
GLUCOSE SERPL-MCNC: 94 MG/DL (ref 70–110)
POTASSIUM SERPL-SCNC: 4.1 MMOL/L (ref 3.5–5.1)
SODIUM SERPL-SCNC: 139 MMOL/L (ref 136–145)

## 2023-05-02 PROCEDURE — 99499 RISK ADDL DX/OHS AUDIT: ICD-10-PCS | Mod: S$GLB,,, | Performed by: PHYSICIAN ASSISTANT

## 2023-05-02 PROCEDURE — 1101F PT FALLS ASSESS-DOCD LE1/YR: CPT | Mod: HCNC,CPTII,S$GLB, | Performed by: UROLOGY

## 2023-05-02 PROCEDURE — 3288F FALL RISK ASSESSMENT DOCD: CPT | Mod: HCNC,CPTII,S$GLB, | Performed by: UROLOGY

## 2023-05-02 PROCEDURE — 80048 BASIC METABOLIC PNL TOTAL CA: CPT | Mod: HCNC | Performed by: PHYSICIAN ASSISTANT

## 2023-05-02 PROCEDURE — 3078F DIAST BP <80 MM HG: CPT | Mod: HCNC,CPTII,S$GLB, | Performed by: PHYSICIAN ASSISTANT

## 2023-05-02 PROCEDURE — 3074F SYST BP LT 130 MM HG: CPT | Mod: HCNC,CPTII,S$GLB, | Performed by: UROLOGY

## 2023-05-02 PROCEDURE — 99213 OFFICE O/P EST LOW 20 MIN: CPT | Mod: HCNC,S$GLB,, | Performed by: UROLOGY

## 2023-05-02 PROCEDURE — 99213 PR OFFICE/OUTPT VISIT, EST, LEVL III, 20-29 MIN: ICD-10-PCS | Mod: HCNC,S$GLB,, | Performed by: UROLOGY

## 2023-05-02 PROCEDURE — 36415 COLL VENOUS BLD VENIPUNCTURE: CPT | Mod: HCNC | Performed by: PHYSICIAN ASSISTANT

## 2023-05-02 PROCEDURE — 1159F MED LIST DOCD IN RCRD: CPT | Mod: HCNC,CPTII,S$GLB, | Performed by: UROLOGY

## 2023-05-02 PROCEDURE — 3078F DIAST BP <80 MM HG: CPT | Mod: HCNC,CPTII,S$GLB, | Performed by: UROLOGY

## 2023-05-02 PROCEDURE — 3074F PR MOST RECENT SYSTOLIC BLOOD PRESSURE < 130 MM HG: ICD-10-PCS | Mod: HCNC,CPTII,S$GLB, | Performed by: PHYSICIAN ASSISTANT

## 2023-05-02 PROCEDURE — 99215 OFFICE O/P EST HI 40 MIN: CPT | Mod: HCNC,S$GLB,, | Performed by: PHYSICIAN ASSISTANT

## 2023-05-02 PROCEDURE — 99215 PR OFFICE/OUTPT VISIT, EST, LEVL V, 40-54 MIN: ICD-10-PCS | Mod: HCNC,S$GLB,, | Performed by: PHYSICIAN ASSISTANT

## 2023-05-02 PROCEDURE — 3288F PR FALLS RISK ASSESSMENT DOCUMENTED: ICD-10-PCS | Mod: HCNC,CPTII,S$GLB, | Performed by: UROLOGY

## 2023-05-02 PROCEDURE — 1101F PT FALLS ASSESS-DOCD LE1/YR: CPT | Mod: HCNC,CPTII,S$GLB, | Performed by: PHYSICIAN ASSISTANT

## 2023-05-02 PROCEDURE — 1159F PR MEDICATION LIST DOCUMENTED IN MEDICAL RECORD: ICD-10-PCS | Mod: HCNC,CPTII,S$GLB, | Performed by: PHYSICIAN ASSISTANT

## 2023-05-02 PROCEDURE — 1125F PR PAIN SEVERITY QUANTIFIED, PAIN PRESENT: ICD-10-PCS | Mod: HCNC,CPTII,S$GLB, | Performed by: PHYSICIAN ASSISTANT

## 2023-05-02 PROCEDURE — 99999 PR PBB SHADOW E&M-EST. PATIENT-LVL III: CPT | Mod: PBBFAC,HCNC,, | Performed by: UROLOGY

## 2023-05-02 PROCEDURE — 1160F RVW MEDS BY RX/DR IN RCRD: CPT | Mod: HCNC,CPTII,S$GLB, | Performed by: PHYSICIAN ASSISTANT

## 2023-05-02 PROCEDURE — 1160F RVW MEDS BY RX/DR IN RCRD: CPT | Mod: HCNC,CPTII,S$GLB, | Performed by: UROLOGY

## 2023-05-02 PROCEDURE — 3288F PR FALLS RISK ASSESSMENT DOCUMENTED: ICD-10-PCS | Mod: HCNC,CPTII,S$GLB, | Performed by: PHYSICIAN ASSISTANT

## 2023-05-02 PROCEDURE — 3078F PR MOST RECENT DIASTOLIC BLOOD PRESSURE < 80 MM HG: ICD-10-PCS | Mod: HCNC,CPTII,S$GLB, | Performed by: UROLOGY

## 2023-05-02 PROCEDURE — 99499 UNLISTED E&M SERVICE: CPT | Mod: S$GLB,,, | Performed by: PHYSICIAN ASSISTANT

## 2023-05-02 PROCEDURE — 1125F AMNT PAIN NOTED PAIN PRSNT: CPT | Mod: HCNC,CPTII,S$GLB, | Performed by: UROLOGY

## 2023-05-02 PROCEDURE — 99999 PR PBB SHADOW E&M-EST. PATIENT-LVL IV: ICD-10-PCS | Mod: PBBFAC,HCNC,, | Performed by: PHYSICIAN ASSISTANT

## 2023-05-02 PROCEDURE — 99999 PR PBB SHADOW E&M-EST. PATIENT-LVL IV: CPT | Mod: PBBFAC,HCNC,, | Performed by: PHYSICIAN ASSISTANT

## 2023-05-02 PROCEDURE — 1159F PR MEDICATION LIST DOCUMENTED IN MEDICAL RECORD: ICD-10-PCS | Mod: HCNC,CPTII,S$GLB, | Performed by: UROLOGY

## 2023-05-02 PROCEDURE — 1101F PR PT FALLS ASSESS DOC 0-1 FALLS W/OUT INJ PAST YR: ICD-10-PCS | Mod: HCNC,CPTII,S$GLB, | Performed by: PHYSICIAN ASSISTANT

## 2023-05-02 PROCEDURE — 1125F AMNT PAIN NOTED PAIN PRSNT: CPT | Mod: HCNC,CPTII,S$GLB, | Performed by: PHYSICIAN ASSISTANT

## 2023-05-02 PROCEDURE — 3078F PR MOST RECENT DIASTOLIC BLOOD PRESSURE < 80 MM HG: ICD-10-PCS | Mod: HCNC,CPTII,S$GLB, | Performed by: PHYSICIAN ASSISTANT

## 2023-05-02 PROCEDURE — 3074F PR MOST RECENT SYSTOLIC BLOOD PRESSURE < 130 MM HG: ICD-10-PCS | Mod: HCNC,CPTII,S$GLB, | Performed by: UROLOGY

## 2023-05-02 PROCEDURE — 1160F PR REVIEW ALL MEDS BY PRESCRIBER/CLIN PHARMACIST DOCUMENTED: ICD-10-PCS | Mod: HCNC,CPTII,S$GLB, | Performed by: PHYSICIAN ASSISTANT

## 2023-05-02 PROCEDURE — 3074F SYST BP LT 130 MM HG: CPT | Mod: HCNC,CPTII,S$GLB, | Performed by: PHYSICIAN ASSISTANT

## 2023-05-02 PROCEDURE — 3288F FALL RISK ASSESSMENT DOCD: CPT | Mod: HCNC,CPTII,S$GLB, | Performed by: PHYSICIAN ASSISTANT

## 2023-05-02 PROCEDURE — 1125F PR PAIN SEVERITY QUANTIFIED, PAIN PRESENT: ICD-10-PCS | Mod: HCNC,CPTII,S$GLB, | Performed by: UROLOGY

## 2023-05-02 PROCEDURE — 1101F PR PT FALLS ASSESS DOC 0-1 FALLS W/OUT INJ PAST YR: ICD-10-PCS | Mod: HCNC,CPTII,S$GLB, | Performed by: UROLOGY

## 2023-05-02 PROCEDURE — 1160F PR REVIEW ALL MEDS BY PRESCRIBER/CLIN PHARMACIST DOCUMENTED: ICD-10-PCS | Mod: HCNC,CPTII,S$GLB, | Performed by: UROLOGY

## 2023-05-02 PROCEDURE — 1159F MED LIST DOCD IN RCRD: CPT | Mod: HCNC,CPTII,S$GLB, | Performed by: PHYSICIAN ASSISTANT

## 2023-05-02 PROCEDURE — 99999 PR PBB SHADOW E&M-EST. PATIENT-LVL III: ICD-10-PCS | Mod: PBBFAC,HCNC,, | Performed by: UROLOGY

## 2023-05-02 RX ORDER — ATORVASTATIN CALCIUM 40 MG/1
40 TABLET, FILM COATED ORAL DAILY
COMMUNITY
End: 2023-07-05 | Stop reason: SDUPTHER

## 2023-05-02 NOTE — TELEPHONE ENCOUNTER
----- Message from GEORGIANA Mcmahon FNP sent at 5/2/2023  3:29 PM CDT -----  Regarding: RE: Appointment  Offer 5/5 1p spot  ----- Message -----  From: Clover Villalta MA  Sent: 5/2/2023  11:44 AM CDT  To: GEORGIANA Mcmahon FNP  Subject: Appointment                                      Per phone call from Manfred Cabrera would like you to see this pt asap. Pt blood sugar dropped very low in clinic last week. Please advise when you would like to see pt

## 2023-05-02 NOTE — PROGRESS NOTES
Subjective:       Patient ID: Henrietta Villasenor is a 83 y.o. female.    Chief Complaint: Nephrolithiasis (Stone management )    HPI arvind.  She will also need a right ureteroscopic stone extraction  ent is here with an 9 mm right UVJ stone.  Dr. Acharya placed a stent.  She is doing well.  she needs a urine culture and sensitivity  Poss rt ureterocoele.  Feeling well at this moment time she is unable to give a specimen for urine culture    Past Medical History:   Diagnosis Date    Abnormality of lung 11/08/2011    Stable bandlike opacities at the lung bases, most likely representing      Anxiety     Arthritis     CAD (coronary artery disease)     Calculus of ureter 2/22/2011    CHF (congestive heart failure)     Chronic back pain 7/29/2012    Colon polyp 9/2010; 11/2010    Colon polyps 7/29/2012    Coronary artery disease involving native coronary artery of native heart with angina pectoris     Depression     Diabetes mellitus     Diabetes mellitus type II     Diverticulitis large intestine 7/27/2015    Diverticulitis of large intestine without perforation or abscess without bleeding     Diverticulosis 09/25/2010; 11/02/2010; 11/08/2011; 7/29/2012    Duodenal disorder 08/25/2011    Duodenal erosion noted on EGD.    Duodenal ulcer, unspecified as acute or chronic, without hemorrhage, perforation, or obstruction 8/24/2011    E. coli sepsis 12/2010    Due to left ureteral stone with left nephrostomy tube - hospitalized in [place]    Esophageal dysmotility 01/24/2012    Noted on upper GI-barium swallow.    Facial weakness 1969    Left facial weakness s/p left mastoidectomy in ~ 1969.    Fatty liver 11/08/2011    Reported on CT-abdomen and in 06/2012 Gastro clinic visit note.    Gastric polyp 09/29/2010    GERD (gastroesophageal reflux disease) 7/29/2012    Hepatomegaly 11/08/2011    Reported on CT-abdomen    Herpes zoster with other nervous system complications(053.19) 2/28/2011    Hiatal hernia 06/26/2006; 09/29/2010;  08/25/2011    Noted on barium swallow 2006; noted on  EGD 2011.    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    Trouble in sleeping     Type II or unspecified type diabetes mellitus with neurological manifestations, not stated as uncontrolled(250.60)     Type II or unspecified type diabetes mellitus with peripheral circulatory disorders, not stated as uncontrolled(250.70)     Type II or unspecified type diabetes mellitus with renal manifestations, not stated as uncontrolled(250.40)        Past Surgical History:   Procedure Laterality Date    BELPHAROPTOSIS REPAIR      s/p LAMBERTO levator repair - Dr. Dejesus    CARDIAC CATHETERIZATION      CARPAL TUNNEL RELEASE  3/13/2012    CATARACT EXTRACTION W/  INTRAOCULAR LENS IMPLANT Right 10/31/2018        CATARACT EXTRACTION W/  INTRAOCULAR LENS IMPLANT Left 03/22/2018        CHOLECYSTECTOMY      COLONOSCOPY N/A 11/16/2016    Procedure: COLONOSCOPY;  Surgeon: hCris Storey MD;  Location: [address]);  Service: Endoscopy;  Laterality: N/A;    COLONOSCOPY N/A 6/14/2017    Procedure: COLONOSCOPY;  Surgeon: Chris Storey MD;  Location: [facility] ([address] [place] [address]);  Service: Endoscopy;  Laterality: N/A;  colonoscopy in 3 months with  better bowel prep. - Split PEG prep ordered    CYSTOSCOPY  4/19/2023    Procedure: CYSTOSCOPY;  Surgeon: Domo Bates MD;  Location: [facility] OR 1ST FLR;  Service: Urology;;    ESOPHAGOGASTRODUODENOSCOPY N/A 11/10/2021    Procedure: EGD (ESOPHAGOGASTRODUODENOSCOPY);  Surgeon: Kuldeep Velázquez MD;  Location: [facility] ENDO (4TH FLR);  Service: Endoscopy;  Laterality: N/A;  11/4-eliquis hold ok see te -tb-fully vacc-inst mail and verbal-    EXTRACORPOREAL SHOCK WAVE LITHOTRIPSY      INJECTION OF ANESTHETIC AGENT AROUND NERVE Bilateral 6/24/2020    Procedure: BLOCK, NERVE, C4-C5-C6 MEDIAL BRANCH ok per [place] to add on;  Surgeon: Oscar Ravi MD;  Location: [facility] PAIN MGT;  Service: Pain Management;  Laterality: Bilateral;    INTRAOCULAR PROSTHESES INSERTION Right 10/31/2018    Procedure: INSERTION-INTRAOCULAR LENS (IOL);  Surgeon: Keysha Valle MD;  Location: [facility];  Service: Ophthalmology;  Laterality: Right;    JOINT REPLACEMENT      mastoid tumor removal  1969    Left mastoidectomy with residual left facial weakness.    NEPHROSTOMY      OOPHORECTOMY      PHACOEMULSIFICATION OF CATARACT Right 10/31/2018    Procedure: PHACOEMULSIFICATION-ASPIRATION-CATARACT;  Surgeon: Keysha Valle MD;  Location: [facility];  Service: Ophthalmology;  Laterality: Right;    RETROGRADE PYELOGRAPHY Right 4/19/2023    Procedure: PYELOGRAM, RETROGRADE;  Surgeon: Domo Bates MD;  Location: [facility] OR 1ST FLR;  Service: Urology;  Laterality: Right;    TOTAL ABDOMINAL HYSTERECTOMY  1969    TAHBSO    TOTAL KNEE ARTHROPLASTY  9/13/2012    Left    TRANSFORAMINAL EPIDURAL INJECTION OF STEROID Bilateral 9/17/2019    Procedure: INJECTION, STEROID, EPIDURAL, TRANSFORAMINAL APPROACH;  Surgeon: Oscar Ravi MD;  Location: [facility] PAIN MGT;  Service: Pain Management;  Laterality: Bilateral;  B/L TFESI L4/L5    TRANSFORAMINAL EPIDURAL INJECTION OF STEROID Bilateral 10/2/2020    Procedure: INJECTION, STEROID, EPIDURAL, TRANSFORAMINAL APPROACH;   Surgeon: Oscar Ravi MD;  Location: [facility] PAIN MGT;  Service: Pain Management;  Laterality: Bilateral;  B/L TFESI L4/5    TRANSFORAMINAL EPIDURAL INJECTION OF STEROID Bilateral 5/7/2021    Procedure: INJECTION, STEROID, EPIDURAL, TRANSFORAMINAL APPROACH, L4-L5 clear to hold Eliquis 3 days;  Surgeon: Oscar Ravi MD;  Location: [facility] PAIN MGT;  Service: Pain Management;  Laterality: Bilateral;    URETERAL STENT PLACEMENT Right 4/19/2023    Procedure: INSERTION, STENT, URETER;  Surgeon: Domo Bates MD;  [facility];  Service: Urology;  Laterality: Right;       Family History   Problem Relation Age of Onset    Sleep apnea Sister     Diabetes Sister     Cancer Mother         brain tumor    Hypertension Mother     Heart disease Father     Heart attack Father     Dementia Brother     Lupus Brother     Frontotemporal dementia Brother     No Known Problems Daughter     No Known Problems Son     Diabetes Sister     Lupus Sister     Breast cancer Sister     Diabetes Sister     Cancer Sister         breast cancer    Heart attack Sister     Diabetes Sister     Liver cancer Brother     Drug abuse Brother     Alcohol abuse Brother     Cirrhosis Brother     Drug abuse Brother     No Known Problems Daughter     No Known Problems Son     No Known Problems Son     No Known Problems Maternal Grandmother     No Known Problems Maternal Grandfather     No Known Problems Paternal Grandmother     No Known Problems Paternal Grandfather     Diabetes Brother     Dementia Brother     Diabetes Other     Ovarian cancer Other         Niece     Breast cancer Maternal Aunt     No Known Problems Maternal Uncle     No Known Problems Paternal Aunt     No Known Problems Paternal Uncle     Glaucoma Neg Hx     Blindness Neg Hx     Celiac disease Neg Hx     Colon cancer Neg Hx     Colon polyps Neg Hx     Esophageal cancer Neg Hx     Inflammatory bowel disease Neg Hx     Liver disease Neg Hx     Rectal cancer Neg Hx     Stomach  cancer Neg Hx     Ulcerative colitis Neg Hx     Melanoma Neg Hx     Multiple sclerosis Neg Hx     Psoriasis Neg Hx     Amblyopia Neg Hx     Cataracts Neg Hx     Macular degeneration Neg Hx     Retinal detachment Neg Hx     Strabismus Neg Hx     Stroke Neg Hx     Thyroid disease Neg Hx        Social History     Socioeconomic History    Marital status: Single    Number of children: 5   Tobacco Use    Smoking status: Former     Packs/day: 1.00     Years: 15.00     Pack years: 15.00     Types: Cigarettes     Quit date: 2000     Years since quittin.7    Smokeless tobacco: Never   Substance and Sexual Activity    Alcohol use: No    Drug use: No    Sexual activity: Never   Social History Narrative    Single with 5 children. Lives alone. Retired cook.     Social Determinants of Health     Financial Resource Strain: Medium Risk    Difficulty of Paying Living Expenses: Somewhat hard   Food Insecurity: No Food Insecurity    Worried About Running Out of Food in the Last Year: Never true    Ran Out of Food in the Last Year: Never true   Transportation Needs: No Transportation Needs    Lack of Transportation (Medical): No    Lack of Transportation (Non-Medical): No   Physical Activity: Insufficiently Active    Days of Exercise per Week: 5 days    Minutes of Exercise per Session: 10 min   Stress: No Stress Concern Present    Feeling of Stress : Only a little   Social Connections: Unknown    Frequency of Communication with Friends and Family: Never    Frequency of Social Gatherings with Friends and Family: Never    Attends Confucianist Services: Never    Active Member of Clubs or Organizations: No    Attends Club or Organization Meetings: Never   Housing Stability: Low Risk     Unable to Pay for Housing in the Last Year: No    Number of Places Lived in the Last Year: 1    Unstable Housing in the Last Year: No       Allergies:  Crestor [rosuvastatin], Ezetimibe, Hydrocodone, Lisinopril, Sulfa (sulfonamide antibiotics),  "Sulfamethoxazole, Sulfamethoxazole-trimethoprim, and Valsartan    Medications:    Current Outpatient Medications:     ACCU-CHEK GUIDE TEST STRIPS Strp, TEST BLOOD SUGAR THREE TIMES DAILY, Disp: 300 strip, Rfl: 3    ACCU-CHEK SOFT DEV LANCETS Kit, , Disp: , Rfl:     albuterol (PROVENTIL/VENTOLIN HFA) 90 mcg/actuation inhaler, Inhale 2 puffs by mouth into the lungs every 4 (four) hours as needed for Wheezing. Rescue, Disp: 25.5 g, Rfl: 3    amLODIPine (NORVASC) 10 MG tablet, Take 1 tablet (10 mg total) by mouth once daily., Disp: 30 tablet, Rfl: 0    apixaban (ELIQUIS) 5 mg Tab, Take 1 tablet (5 mg total) by mouth 2 (two) times daily., Disp: 60 tablet, Rfl: 0    BD ULTRA-FINE OLU PEN NEEDLE 32 gauge x 5/32" Ndle, Uses 4 times daily, on multiple daily insulin injections, Disp: 130 each, Rfl: 12    carvediloL (COREG) 25 MG tablet, Take 1 tablet (25 mg total) by mouth 2 (two) times daily., Disp: 60 tablet, Rfl: 0    cholecalciferol, vitamin D3, 125 mcg (5,000 unit) capsule, Take 5,000 Units by mouth once daily., Disp: , Rfl:     DROPLET PEN NEEDLE 29 gauge x 1/2" Ndle, USE FOUR TIMES DAILY, Disp: 400 each, Rfl: 3    DROPSAFE ALCOHOL PREP PADS PadM, USE TOPICALLY TO TEST BLOOD SUGAR FOUR TIMES DAILY, Disp: 400 each, Rfl: 3    DULoxetine (CYMBALTA) 60 MG capsule, Take 1 capsule (60 mg total) by mouth 2 (two) times daily. Please hold until you follow up with your primary care provider, Disp: 180 capsule, Rfl: 1    fluticasone propionate (FLONASE) 50 mcg/actuation nasal spray, Instill 2 sprays in each nostril once daily., Disp: 16 g, Rfl: 1    insulin (LANTUS SOLOSTAR U-100 INSULIN) glargine 100 units/mL (3mL) SubQ pen, Inject 60 Units into the skin once daily., Disp: 60 mL, Rfl: 6    insulin aspart U-100 (NOVOLOG) 100 unit/mL (3 mL) InPn pen, Inject 24 Units into the skin before breakfast AND 28 Units daily with lunch AND 28 Units before dinner., Disp: 120 mL, Rfl: 3    lancets (ACCU-CHEK SOFTCLIX LANCETS) Misc, TEST BLOOD " SUGAR FOUR TIMES DAILY, Disp: 400 each, Rfl: 3    loratadine (CLARITIN) 10 mg tablet, Take 1 tablet (10 mg total) by mouth once daily., Disp: 30 tablet, Rfl: 4    olopatadine (PATADAY) 0.2 % Drop, Place 1 drop into both eyes once daily., Disp: 2.5 mL, Rfl: 0    [START ON 5/5/2023] oxyCODONE-acetaminophen (PERCOCET)  mg per tablet, Take 1 tablet by mouth 5 (five) times daily., Disp: 150 tablet, Rfl: 0    pantoprazole (PROTONIX) 40 MG tablet, Take 1 tablet (40 mg total) by mouth once daily., Disp: 90 tablet, Rfl: 1    rOPINIRole (REQUIP) 0.5 MG tablet, Take 1 tablet (0.5 mg total) by mouth every evening., Disp: 30 tablet, Rfl: 0    triamcinolone acetonide 0.1% (KENALOG) 0.1 % cream, Apply topically 2 (two) times daily., Disp: 80 g, Rfl: 0    blood-glucose meter kit, Use as instructed, Disp: 1 each, Rfl: 0    Review of Systems    Objective:      Physical Exam  Constitutional:       Appearance: She is well-developed.   HENT:      Head: Normocephalic.   Cardiovascular:      Rate and Rhythm: Normal rate.   Pulmonary:      Effort: Pulmonary effort is normal.   Abdominal:      Palpations: Abdomen is soft.   Musculoskeletal:         General: Normal range of motion.   Skin:     General: Skin is warm.   Neurological:      Mental Status: She is alert.       Assessment:       1. Ureteral calculus        Plan:       Henrietta was seen today for nephrolithiasis.    Diagnoses and all orders for this visit:    Ureteral calculus  -     CULTURE, URINE  -     X-Ray Abdomen AP 1 View; Future  -     US Retroperitoneal Complete; Future    Will need KUB and renal ultrasound 1 month after ureteroscopy    The patient is scheduled for ureteroscopy. The risks and benefits of ureteroscopy were discussed with the patient in detail.  Consent was obtained.  The risks include but are not limited to burning with urination, bleeding, infection, pain, incomplete fragmentation of the stone, need for further procedures, injury to the kidney, ureter,  bladder and need for open surgery.  The patient was informed that they may require a ureteral stent and that stents can cause irritative voiding symptoms.  They also understand that ureteral stents are temporary and must be removed or exchanged in a timely fashion as they can calcify and make more stones and become difficult to remove. Alternative treatments were also discussed with the patient in detail to include ESWL, percutaneous treatment of the stone, open surgery or observation. Patient understands these risks and has agreed to proceed with surgery.

## 2023-05-02 NOTE — H&P (VIEW-ONLY)
Subjective:       Patient ID: Henrietta Villasenor is a 83 y.o. female.    Chief Complaint: Nephrolithiasis (Stone management )    HPI arvind.  She will also need a right ureteroscopic stone extraction  ent is here with an 9 mm right UVJ stone.  Dr. Acharya placed a stent.  She is doing well.  she needs a urine culture and sensitivity  Poss rt ureterocoele.  Feeling well at this moment time she is unable to give a specimen for urine culture    Past Medical History:   Diagnosis Date    Abnormality of lung 11/08/2011    Stable bandlike opacities at the lung bases, most likely representing      Anxiety     Arthritis     CAD (coronary artery disease)     Calculus of ureter 2/22/2011    CHF (congestive heart failure)     Chronic back pain 7/29/2012    Colon polyp 9/2010; 11/2010    Colon polyps 7/29/2012    Coronary artery disease involving native coronary artery of native heart with angina pectoris     Depression     Diabetes mellitus     Diabetes mellitus type II     Diverticulitis large intestine 7/27/2015    Diverticulitis of large intestine without perforation or abscess without bleeding     Diverticulosis 09/25/2010; 11/02/2010; 11/08/2011; 7/29/2012    Duodenal disorder 08/25/2011    Duodenal erosion noted on EGD.    Duodenal ulcer, unspecified as acute or chronic, without hemorrhage, perforation, or obstruction 8/24/2011    E. coli sepsis 12/2010    Due to left ureteral stone with left nephrostomy tube - hospitalized in [place]    Esophageal dysmotility 01/24/2012    Noted on upper GI-barium swallow.    Facial weakness 1969    Left facial weakness s/p left mastoidectomy in ~ 1969.    Fatty liver 11/08/2011    Reported on CT-abdomen and in 06/2012 Gastro clinic visit note.    Gastric polyp 09/29/2010    GERD (gastroesophageal reflux disease) 7/29/2012    Hepatomegaly 11/08/2011    Reported on CT-abdomen    Herpes zoster with other nervous system complications(053.19) 2/28/2011    Hiatal hernia 06/26/2006; 09/29/2010;  08/25/2011    Noted on barium swallow 2006; noted on  EGD 2011.    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    Trouble in sleeping     Type II or unspecified type diabetes mellitus with neurological manifestations, not stated as uncontrolled(250.60)     Type II or unspecified type diabetes mellitus with peripheral circulatory disorders, not stated as uncontrolled(250.70)     Type II or unspecified type diabetes mellitus with renal manifestations, not stated as uncontrolled(250.40)        Past Surgical History:   Procedure Laterality Date    BELPHAROPTOSIS REPAIR      s/p LAMBERTO levator repair - Dr. Dejesus    CARDIAC CATHETERIZATION      CARPAL TUNNEL RELEASE  3/13/2012    CATARACT EXTRACTION W/  INTRAOCULAR LENS IMPLANT Right 10/31/2018        CATARACT EXTRACTION W/  INTRAOCULAR LENS IMPLANT Left 03/22/2018        CHOLECYSTECTOMY      COLONOSCOPY N/A 11/16/2016    Procedure: COLONOSCOPY;  Surgeon: Chris Storey MD;  Location: [address]);  Service: Endoscopy;  Laterality: N/A;    COLONOSCOPY N/A 6/14/2017    Procedure: COLONOSCOPY;  Surgeon: Chris Storey MD;  Location: [facility] ([address] [place] [address]);  Service: Endoscopy;  Laterality: N/A;  colonoscopy in 3 months with  better bowel prep. - Split PEG prep ordered    CYSTOSCOPY  4/19/2023    Procedure: CYSTOSCOPY;  Surgeon: Domo Bates MD;  Location: [facility] OR 1ST FLR;  Service: Urology;;    ESOPHAGOGASTRODUODENOSCOPY N/A 11/10/2021    Procedure: EGD (ESOPHAGOGASTRODUODENOSCOPY);  Surgeon: Kuldeep Velázquez MD;  Location: [facility] ENDO (4TH FLR);  Service: Endoscopy;  Laterality: N/A;  11/4-eliquis hold ok see te -tb-fully vacc-inst mail and verbal-    EXTRACORPOREAL SHOCK WAVE LITHOTRIPSY      INJECTION OF ANESTHETIC AGENT AROUND NERVE Bilateral 6/24/2020    Procedure: BLOCK, NERVE, C4-C5-C6 MEDIAL BRANCH ok per [place] to add on;  Surgeon: Oscar Ravi MD;  Location: [facility] PAIN MGT;  Service: Pain Management;  Laterality: Bilateral;    INTRAOCULAR PROSTHESES INSERTION Right 10/31/2018    Procedure: INSERTION-INTRAOCULAR LENS (IOL);  Surgeon: Keysha Valle MD;  Location: [facility];  Service: Ophthalmology;  Laterality: Right;    JOINT REPLACEMENT      mastoid tumor removal  1969    Left mastoidectomy with residual left facial weakness.    NEPHROSTOMY      OOPHORECTOMY      PHACOEMULSIFICATION OF CATARACT Right 10/31/2018    Procedure: PHACOEMULSIFICATION-ASPIRATION-CATARACT;  Surgeon: Keysha Valle MD;  Location: [facility];  Service: Ophthalmology;  Laterality: Right;    RETROGRADE PYELOGRAPHY Right 4/19/2023    Procedure: PYELOGRAM, RETROGRADE;  Surgeon: Domo Bates MD;  Location: [facility] OR 1ST FLR;  Service: Urology;  Laterality: Right;    TOTAL ABDOMINAL HYSTERECTOMY  1969    TAHBSO    TOTAL KNEE ARTHROPLASTY  9/13/2012    Left    TRANSFORAMINAL EPIDURAL INJECTION OF STEROID Bilateral 9/17/2019    Procedure: INJECTION, STEROID, EPIDURAL, TRANSFORAMINAL APPROACH;  Surgeon: Oscar Ravi MD;  Location: [facility] PAIN MGT;  Service: Pain Management;  Laterality: Bilateral;  B/L TFESI L4/L5    TRANSFORAMINAL EPIDURAL INJECTION OF STEROID Bilateral 10/2/2020    Procedure: INJECTION, STEROID, EPIDURAL, TRANSFORAMINAL APPROACH;   Surgeon: Oscar Ravi MD;  Location: [facility] PAIN MGT;  Service: Pain Management;  Laterality: Bilateral;  B/L TFESI L4/5    TRANSFORAMINAL EPIDURAL INJECTION OF STEROID Bilateral 5/7/2021    Procedure: INJECTION, STEROID, EPIDURAL, TRANSFORAMINAL APPROACH, L4-L5 clear to hold Eliquis 3 days;  Surgeon: Oscar Ravi MD;  Location: [facility] PAIN MGT;  Service: Pain Management;  Laterality: Bilateral;    URETERAL STENT PLACEMENT Right 4/19/2023    Procedure: INSERTION, STENT, URETER;  Surgeon: Domo Bates MD;  [facility];  Service: Urology;  Laterality: Right;       Family History   Problem Relation Age of Onset    Sleep apnea Sister     Diabetes Sister     Cancer Mother         brain tumor    Hypertension Mother     Heart disease Father     Heart attack Father     Dementia Brother     Lupus Brother     Frontotemporal dementia Brother     No Known Problems Daughter     No Known Problems Son     Diabetes Sister     Lupus Sister     Breast cancer Sister     Diabetes Sister     Cancer Sister         breast cancer    Heart attack Sister     Diabetes Sister     Liver cancer Brother     Drug abuse Brother     Alcohol abuse Brother     Cirrhosis Brother     Drug abuse Brother     No Known Problems Daughter     No Known Problems Son     No Known Problems Son     No Known Problems Maternal Grandmother     No Known Problems Maternal Grandfather     No Known Problems Paternal Grandmother     No Known Problems Paternal Grandfather     Diabetes Brother     Dementia Brother     Diabetes Other     Ovarian cancer Other         Niece     Breast cancer Maternal Aunt     No Known Problems Maternal Uncle     No Known Problems Paternal Aunt     No Known Problems Paternal Uncle     Glaucoma Neg Hx     Blindness Neg Hx     Celiac disease Neg Hx     Colon cancer Neg Hx     Colon polyps Neg Hx     Esophageal cancer Neg Hx     Inflammatory bowel disease Neg Hx     Liver disease Neg Hx     Rectal cancer Neg Hx     Stomach  cancer Neg Hx     Ulcerative colitis Neg Hx     Melanoma Neg Hx     Multiple sclerosis Neg Hx     Psoriasis Neg Hx     Amblyopia Neg Hx     Cataracts Neg Hx     Macular degeneration Neg Hx     Retinal detachment Neg Hx     Strabismus Neg Hx     Stroke Neg Hx     Thyroid disease Neg Hx        Social History     Socioeconomic History    Marital status: Single    Number of children: 5   Tobacco Use    Smoking status: Former     Packs/day: 1.00     Years: 15.00     Pack years: 15.00     Types: Cigarettes     Quit date: 2000     Years since quittin.7    Smokeless tobacco: Never   Substance and Sexual Activity    Alcohol use: No    Drug use: No    Sexual activity: Never   Social History Narrative    Single with 5 children. Lives alone. Retired cook.     Social Determinants of Health     Financial Resource Strain: Medium Risk    Difficulty of Paying Living Expenses: Somewhat hard   Food Insecurity: No Food Insecurity    Worried About Running Out of Food in the Last Year: Never true    Ran Out of Food in the Last Year: Never true   Transportation Needs: No Transportation Needs    Lack of Transportation (Medical): No    Lack of Transportation (Non-Medical): No   Physical Activity: Insufficiently Active    Days of Exercise per Week: 5 days    Minutes of Exercise per Session: 10 min   Stress: No Stress Concern Present    Feeling of Stress : Only a little   Social Connections: Unknown    Frequency of Communication with Friends and Family: Never    Frequency of Social Gatherings with Friends and Family: Never    Attends Druze Services: Never    Active Member of Clubs or Organizations: No    Attends Club or Organization Meetings: Never   Housing Stability: Low Risk     Unable to Pay for Housing in the Last Year: No    Number of Places Lived in the Last Year: 1    Unstable Housing in the Last Year: No       Allergies:  Crestor [rosuvastatin], Ezetimibe, Hydrocodone, Lisinopril, Sulfa (sulfonamide antibiotics),  "Sulfamethoxazole, Sulfamethoxazole-trimethoprim, and Valsartan    Medications:    Current Outpatient Medications:     ACCU-CHEK GUIDE TEST STRIPS Strp, TEST BLOOD SUGAR THREE TIMES DAILY, Disp: 300 strip, Rfl: 3    ACCU-CHEK SOFT DEV LANCETS Kit, , Disp: , Rfl:     albuterol (PROVENTIL/VENTOLIN HFA) 90 mcg/actuation inhaler, Inhale 2 puffs by mouth into the lungs every 4 (four) hours as needed for Wheezing. Rescue, Disp: 25.5 g, Rfl: 3    amLODIPine (NORVASC) 10 MG tablet, Take 1 tablet (10 mg total) by mouth once daily., Disp: 30 tablet, Rfl: 0    apixaban (ELIQUIS) 5 mg Tab, Take 1 tablet (5 mg total) by mouth 2 (two) times daily., Disp: 60 tablet, Rfl: 0    BD ULTRA-FINE OLU PEN NEEDLE 32 gauge x 5/32" Ndle, Uses 4 times daily, on multiple daily insulin injections, Disp: 130 each, Rfl: 12    carvediloL (COREG) 25 MG tablet, Take 1 tablet (25 mg total) by mouth 2 (two) times daily., Disp: 60 tablet, Rfl: 0    cholecalciferol, vitamin D3, 125 mcg (5,000 unit) capsule, Take 5,000 Units by mouth once daily., Disp: , Rfl:     DROPLET PEN NEEDLE 29 gauge x 1/2" Ndle, USE FOUR TIMES DAILY, Disp: 400 each, Rfl: 3    DROPSAFE ALCOHOL PREP PADS PadM, USE TOPICALLY TO TEST BLOOD SUGAR FOUR TIMES DAILY, Disp: 400 each, Rfl: 3    DULoxetine (CYMBALTA) 60 MG capsule, Take 1 capsule (60 mg total) by mouth 2 (two) times daily. Please hold until you follow up with your primary care provider, Disp: 180 capsule, Rfl: 1    fluticasone propionate (FLONASE) 50 mcg/actuation nasal spray, Instill 2 sprays in each nostril once daily., Disp: 16 g, Rfl: 1    insulin (LANTUS SOLOSTAR U-100 INSULIN) glargine 100 units/mL (3mL) SubQ pen, Inject 60 Units into the skin once daily., Disp: 60 mL, Rfl: 6    insulin aspart U-100 (NOVOLOG) 100 unit/mL (3 mL) InPn pen, Inject 24 Units into the skin before breakfast AND 28 Units daily with lunch AND 28 Units before dinner., Disp: 120 mL, Rfl: 3    lancets (ACCU-CHEK SOFTCLIX LANCETS) Misc, TEST BLOOD " SUGAR FOUR TIMES DAILY, Disp: 400 each, Rfl: 3    loratadine (CLARITIN) 10 mg tablet, Take 1 tablet (10 mg total) by mouth once daily., Disp: 30 tablet, Rfl: 4    olopatadine (PATADAY) 0.2 % Drop, Place 1 drop into both eyes once daily., Disp: 2.5 mL, Rfl: 0    [START ON 5/5/2023] oxyCODONE-acetaminophen (PERCOCET)  mg per tablet, Take 1 tablet by mouth 5 (five) times daily., Disp: 150 tablet, Rfl: 0    pantoprazole (PROTONIX) 40 MG tablet, Take 1 tablet (40 mg total) by mouth once daily., Disp: 90 tablet, Rfl: 1    rOPINIRole (REQUIP) 0.5 MG tablet, Take 1 tablet (0.5 mg total) by mouth every evening., Disp: 30 tablet, Rfl: 0    triamcinolone acetonide 0.1% (KENALOG) 0.1 % cream, Apply topically 2 (two) times daily., Disp: 80 g, Rfl: 0    blood-glucose meter kit, Use as instructed, Disp: 1 each, Rfl: 0    Review of Systems    Objective:      Physical Exam  Constitutional:       Appearance: She is well-developed.   HENT:      Head: Normocephalic.   Cardiovascular:      Rate and Rhythm: Normal rate.   Pulmonary:      Effort: Pulmonary effort is normal.   Abdominal:      Palpations: Abdomen is soft.   Musculoskeletal:         General: Normal range of motion.   Skin:     General: Skin is warm.   Neurological:      Mental Status: She is alert.       Assessment:       1. Ureteral calculus        Plan:       Henrietta was seen today for nephrolithiasis.    Diagnoses and all orders for this visit:    Ureteral calculus  -     CULTURE, URINE  -     X-Ray Abdomen AP 1 View; Future  -     US Retroperitoneal Complete; Future    Will need KUB and renal ultrasound 1 month after ureteroscopy    The patient is scheduled for ureteroscopy. The risks and benefits of ureteroscopy were discussed with the patient in detail.  Consent was obtained.  The risks include but are not limited to burning with urination, bleeding, infection, pain, incomplete fragmentation of the stone, need for further procedures, injury to the kidney, ureter,  bladder and need for open surgery.  The patient was informed that they may require a ureteral stent and that stents can cause irritative voiding symptoms.  They also understand that ureteral stents are temporary and must be removed or exchanged in a timely fashion as they can calcify and make more stones and become difficult to remove. Alternative treatments were also discussed with the patient in detail to include ESWL, percutaneous treatment of the stone, open surgery or observation. Patient understands these risks and has agreed to proceed with surgery.

## 2023-05-02 NOTE — PROGRESS NOTES
Subjective:       Patient ID: Henrietta Villasenor is a 83 y.o. female.        Chief Complaint: Hospital Follow Up    Henrietta Villasenor is an established patient of Jeannine Arriaga MD here today for follow up visit.    She was readmitted after blood sugar dropped to 39 in clinic.  She had given herself unknown amount of insulin and did not eat.      Admitted 4/25-4/26.  Previously admitted 4/18-4/20 due to HANNAH due to obstructing kidney stone s/p stent.  She saw Dr. Rudolph today for f/u.      Her grandson lives with her but does not help take care of her.  Her granddaughter is in the hospital with a sickle cell crisis.  Her niece, Kacie, helps at times.  Her daughter lives in [place].      DM -   Lantus 60 U nightly  Novolog 24 U AM, 28 U lunch, 28 U dinner    Blood sugar since discharge  68, 97, 190, 120    These are the readings she can recall.  She is not writing down blood sugar readings.  She gave herself novolog 24 U after eating though blood sugar was only 68 prior to eating.  After eating and administering insulin blood sugar was 97.  Currently in office blood sugar is 90.  She is due to eat lunch.      HANNAH -   Creatinine down to 1.1 at discharge  Lasix, K+ d/c from list but she has been taking (she thinks)  Will need repeat labs today    Lipids -   Atorvastatin stopped at hospital d/c?  Will investigate, unsure why    CAD    HTN -  Amlodipine 10 mg  Coreg 25 mg BID  BP is 120/60    CKD 3    H/o DVT -  Eliquis 5 mg BID    Heart failure -   Lasix d/c at hospital discharge but she has been taking  No swelling or shortness of breath    MARLON on CPAP         Review of Systems   Constitutional:  Negative for chills, diaphoresis, fatigue and fever.   HENT:  Negative for congestion and sore throat.    Eyes:  Negative for visual disturbance.   Respiratory:  Negative for cough, chest tightness and shortness of breath.    Cardiovascular:  Negative for chest pain, palpitations and leg swelling.   Gastrointestinal:  Negative for  abdominal pain, blood in stool, constipation, diarrhea, nausea and vomiting.   Genitourinary:  Negative for dysuria, frequency, hematuria and urgency.   Musculoskeletal:  Negative for arthralgias and back pain.   Skin:  Negative for rash.   Neurological:  Negative for dizziness, syncope, weakness and headaches.   Psychiatric/Behavioral:  Negative for dysphoric mood and sleep disturbance. The patient is not nervous/anxious.      Objective:      Physical Exam  Vitals and nursing note reviewed.   Constitutional:       Appearance: Normal appearance. She is well-developed.   HENT:      Head: Normocephalic.      Right Ear: External ear normal.      Left Ear: External ear normal.   Eyes:      Pupils: Pupils are equal, round, and reactive to light.   Cardiovascular:      Rate and Rhythm: Normal rate and regular rhythm.      Heart sounds: Normal heart sounds. No murmur heard.    No friction rub. No gallop.   Pulmonary:      Effort: Pulmonary effort is normal. No respiratory distress.      Breath sounds: Normal breath sounds.   Abdominal:      Palpations: Abdomen is soft.      Tenderness: There is no abdominal tenderness.   Skin:     General: Skin is warm and dry.   Neurological:      Mental Status: She is alert.       Assessment:       1. HANNAH (acute kidney injury)    2. Coronary artery disease involving native coronary artery of native heart with angina pectoris    3. Essential hypertension    4. Stage 3a chronic kidney disease    5. Mixed hyperlipidemia    6. History of DVT (deep vein thrombosis)    7. Type 2 diabetes mellitus with diabetic polyneuropathy, with long-term current use of insulin    8. Hypoglycemia associated with diabetes        Plan:       Henrietta was seen today for hospital follow up.    Diagnoses and all orders for this visit:    HANNAH (acute kidney injury)  -     Basic Metabolic Panel; Future    Coronary artery disease involving native coronary artery of native heart with angina pectoris    Essential  "hypertension - stable and controlled, continue current regimen  BP Readings from Last 5 Encounters:   05/02/23 120/60   05/02/23 (!) 91/59   04/26/23 (!) 115/55   04/25/23 (!) 142/60   04/20/23 136/78      Stage 3a chronic kidney disease    Mixed hyperlipidemia - currently holding atorvastatin - will investigate     History of DVT (deep vein thrombosis) - stable and controlled, continue current regimen    Type 2 diabetes mellitus with diabetic polyneuropathy, with long-term current use of insulin    Hypoglycemia associated with diabetes    Patient brings in bag of medications  Taking -  Carvedilol 25 mg BID  Pantoprazole 40 mg daily  Ropinirole 0.5 mg daily  Amlodipine 10 mg daily  Eliquis 5 mg BID  Duloxetine 60 mg BID    D/c at hospital discharge -  Atorvastatin 40 mg  Lasix 40 mg 1/2-1 tablet daily  K+ 10 mEQ daily    Concern over hypoglycemia - discussed in detail how to treat hypoglycemia  Will adjust insulin as she had a low of 68 this morning, she cannot recall all other readings...    Lantus decreased from 60 U --> 50 U  Novolog decreased from 24/28/28 --> 20/20/20    Importance of only taking novolog if eating discussed at length    If pre meal blood sugar is <130, she will hold novolog for that meal    Lab work today    Blood sugar logs given    Follow up in 1 week - will see weekly until blood sugar stabilizes    >45 minutes spent on patient encounter    Lab Results   Component Value Date    HGBA1C 8.2 (H) 03/08/2023    HGBA1C 8.6 (H) 09/09/2022    HGBA1C 9.3 (H) 03/17/2022     Pt has been given instructions populated from patient instructions database and has verbalized understanding of the after visit summary and information contained wherein.    Follow up with a primary care provider. May go to ER for acute shortness of breath, lightheadedness, fever, or any other emergent complaints or changes in condition.    "This note will be shared with the patient"    Future Appointments   Date Time Provider " Department Center   5/9/2023 11:30 AM Margaret Cabrera PA-C [facility]   6/8/2023 12:00 PM Jeannine Arriaga MD [facility]   7/12/2023 11:00 AM Georgina Clemens, ELVIRA [facility]

## 2023-05-03 ENCOUNTER — TELEPHONE (OUTPATIENT)
Dept: INTERNAL MEDICINE | Facility: CLINIC | Age: 83
End: 2023-05-03

## 2023-05-03 NOTE — TELEPHONE ENCOUNTER
Called and spoke to patient about recent lab results. Informed patient that kidney function is stable at this time. Patient verbalized understanding with read back.

## 2023-05-04 ENCOUNTER — TELEPHONE (OUTPATIENT)
Dept: UROLOGY | Facility: CLINIC | Age: 83
End: 2023-05-04
Payer: MEDICARE

## 2023-05-04 DIAGNOSIS — N20.1 URETERAL CALCULUS: Primary | ICD-10-CM

## 2023-05-09 ENCOUNTER — TELEPHONE (OUTPATIENT)
Dept: INTERNAL MEDICINE | Facility: CLINIC | Age: 83
End: 2023-05-09
Payer: MEDICARE

## 2023-05-09 NOTE — TELEPHONE ENCOUNTER
----- Message from Bonnie Mayorga sent at 11/9/2020  9:05 AM CST -----  Regarding: refill  Contact: Henrietta @[phone]  Rx Refill/Request     Is this a Refill or New Rx:  refill     Rx Name and Strength:    oxyCODONE-acetaminophen (PERCOCET)  mg per tablet    Preferred Pharmacy with phone number:   [facility]   [phone] (Phone)  [phone] (Fax)        Communication Preference: Henrietta @[phone]    Additional Information:    Pt is requesting a call back when sent to the pharmacy.     5/10/2023/Yes

## 2023-05-11 ENCOUNTER — TELEPHONE (OUTPATIENT)
Dept: UROLOGY | Facility: CLINIC | Age: 83
End: 2023-05-11
Payer: MEDICARE

## 2023-05-11 NOTE — PRE-PROCEDURE INSTRUCTIONS
PreOp Instructions given:   - Verbal medication information (what to hold and what to take)   - NPO guidelines   - Arrival place directions given; time to be given the day before procedure by the   [place] [place] [facility] [place]  - Bathing with antibacterial soap   - Don't wear any jewelry or bring any valuables AM of surgery   - No makeup or moisturizer to face   - No perfume/cologne, powder, lotions or aftershave   Pt. verbalized understanding.   Pt denies any h/o Anesthesia/Sedation complications or side effects.

## 2023-05-11 NOTE — TELEPHONE ENCOUNTER
Called pt to confirm arrival time of 1015am for procedure on -2023. Gave pt NPO instructions and gave pt opportunity to ask questions. Pt verbalized understanding.

## 2023-05-12 ENCOUNTER — ANESTHESIA EVENT (OUTPATIENT)
Dept: SURGERY | Facility: HOSPITAL | Age: 83
End: 2023-05-12
Payer: MEDICARE

## 2023-05-12 ENCOUNTER — ANESTHESIA (OUTPATIENT)
Dept: SURGERY | Facility: HOSPITAL | Age: 83
End: 2023-05-12
Payer: MEDICARE

## 2023-05-12 ENCOUNTER — HOSPITAL ENCOUNTER (OUTPATIENT)
Facility: HOSPITAL | Age: 83
Discharge: HOME | End: 2023-05-13
Attending: UROLOGY | Admitting: UROLOGY
Payer: MEDICARE

## 2023-05-12 DIAGNOSIS — N20.0 KIDNEY STONE: ICD-10-CM

## 2023-05-12 DIAGNOSIS — N20.0 RIGHT NEPHROLITHIASIS: Primary | ICD-10-CM

## 2023-05-12 LAB
ANION GAP SERPL CALC-SCNC: 9 MMOL/L (ref 8–16)
BASOPHILS # BLD AUTO: 0.03 K/UL (ref 0–0.2)
BASOPHILS NFR BLD: 0.4 % (ref 0–1.9)
BUN SERPL-MCNC: 15 MG/DL (ref 8–23)
CALCIUM SERPL-MCNC: 9.3 MG/DL (ref 8.7–10.5)
CHLORIDE SERPL-SCNC: 106 MMOL/L (ref 95–110)
CO2 SERPL-SCNC: 22 MMOL/L (ref 23–29)
CREAT SERPL-MCNC: 1.2 MG/DL (ref 0.5–1.4)
DIFFERENTIAL METHOD: ABNORMAL
EOSINOPHIL # BLD AUTO: 0.2 K/UL (ref 0–0.5)
EOSINOPHIL NFR BLD: 1.9 % (ref 0–8)
ERYTHROCYTE [DISTWIDTH] IN BLOOD BY AUTOMATED COUNT: 14.9 % (ref 11.5–14.5)
EST. GFR  (NO RACE VARIABLE): 44.9 ML/MIN/1.73 M^2
GLUCOSE SERPL-MCNC: 148 MG/DL (ref 70–110)
HCT VFR BLD AUTO: 32.2 % (ref 37–48.5)
HGB BLD-MCNC: 10.3 G/DL (ref 12–16)
IMM GRANULOCYTES # BLD AUTO: 0.06 K/UL (ref 0–0.04)
IMM GRANULOCYTES NFR BLD AUTO: 0.7 % (ref 0–0.5)
LYMPHOCYTES # BLD AUTO: 1.3 K/UL (ref 1–4.8)
LYMPHOCYTES NFR BLD: 15.8 % (ref 18–48)
MCH RBC QN AUTO: 27.6 PG (ref 27–31)
MCHC RBC AUTO-ENTMCNC: 32 G/DL (ref 32–36)
MCV RBC AUTO: 86 FL (ref 82–98)
MONOCYTES # BLD AUTO: 0.5 K/UL (ref 0.3–1)
MONOCYTES NFR BLD: 5.6 % (ref 4–15)
NEUTROPHILS # BLD AUTO: 6.1 K/UL (ref 1.8–7.7)
NEUTROPHILS NFR BLD: 75.6 % (ref 38–73)
NRBC BLD-RTO: 0 /100 WBC
PLATELET # BLD AUTO: 194 K/UL (ref 150–450)
PMV BLD AUTO: 10 FL (ref 9.2–12.9)
POCT GLUCOSE: 138 MG/DL (ref 70–110)
POCT GLUCOSE: 156 MG/DL (ref 70–110)
POCT GLUCOSE: 175 MG/DL (ref 70–110)
POCT GLUCOSE: 247 MG/DL (ref 70–110)
POTASSIUM SERPL-SCNC: 4.6 MMOL/L (ref 3.5–5.1)
RBC # BLD AUTO: 3.73 M/UL (ref 4–5.4)
SODIUM SERPL-SCNC: 137 MMOL/L (ref 136–145)
WBC # BLD AUTO: 8.06 K/UL (ref 3.9–12.7)

## 2023-05-12 PROCEDURE — C2617 STENT, NON-COR, TEM W/O DEL: HCPCS | Performed by: UROLOGY

## 2023-05-12 PROCEDURE — D9220A PRA ANESTHESIA: ICD-10-PCS | Mod: CRNA,,, | Performed by: NURSE ANESTHETIST, CERTIFIED REGISTERED

## 2023-05-12 PROCEDURE — C1747 OPTIME ENDOSCOPE, SINGLE, URINARY TRACT: HCPCS | Performed by: UROLOGY

## 2023-05-12 PROCEDURE — 52356 CYSTO/URETERO W/LITHOTRIPSY: CPT | Mod: RT,,, | Performed by: UROLOGY

## 2023-05-12 PROCEDURE — 71000016 HC POSTOP RECOV ADDL HR: Performed by: UROLOGY

## 2023-05-12 PROCEDURE — 27201423 OPTIME MED/SURG SUP & DEVICES STERILE SUPPLY: Performed by: UROLOGY

## 2023-05-12 PROCEDURE — 80048 BASIC METABOLIC PNL TOTAL CA: CPT | Performed by: STUDENT IN AN ORGANIZED HEALTH CARE EDUCATION/TRAINING PROGRAM

## 2023-05-12 PROCEDURE — 82962 GLUCOSE BLOOD TEST: CPT | Mod: 91

## 2023-05-12 PROCEDURE — 85025 COMPLETE CBC W/AUTO DIFF WBC: CPT

## 2023-05-12 PROCEDURE — 63600175 PHARM REV CODE 636 W HCPCS: Performed by: NURSE ANESTHETIST, CERTIFIED REGISTERED

## 2023-05-12 PROCEDURE — D9220A PRA ANESTHESIA: Mod: CRNA,,, | Performed by: NURSE ANESTHETIST, CERTIFIED REGISTERED

## 2023-05-12 PROCEDURE — 52356 PR CYSTO/URETERO W/LITHOTRIPSY: ICD-10-PCS | Mod: RT,,, | Performed by: UROLOGY

## 2023-05-12 PROCEDURE — D9220A PRA ANESTHESIA: Mod: ANES,,, | Performed by: ANESTHESIOLOGY

## 2023-05-12 PROCEDURE — C1769 GUIDE WIRE: HCPCS | Performed by: UROLOGY

## 2023-05-12 PROCEDURE — 63600175 PHARM REV CODE 636 W HCPCS: Performed by: ANESTHESIOLOGY

## 2023-05-12 PROCEDURE — 82962 GLUCOSE BLOOD TEST: CPT | Performed by: UROLOGY

## 2023-05-12 PROCEDURE — 36000706: Performed by: UROLOGY

## 2023-05-12 PROCEDURE — 25000003 PHARM REV CODE 250: Performed by: NURSE ANESTHETIST, CERTIFIED REGISTERED

## 2023-05-12 PROCEDURE — 71000015 HC POSTOP RECOV 1ST HR: Performed by: UROLOGY

## 2023-05-12 PROCEDURE — 36000707: Performed by: UROLOGY

## 2023-05-12 PROCEDURE — 99900031 HC PATIENT EDUCATION (STAT)

## 2023-05-12 PROCEDURE — 37000009 HC ANESTHESIA EA ADD 15 MINS: Performed by: UROLOGY

## 2023-05-12 PROCEDURE — D9220A PRA ANESTHESIA: ICD-10-PCS | Mod: ANES,,, | Performed by: ANESTHESIOLOGY

## 2023-05-12 PROCEDURE — G0378 HOSPITAL OBSERVATION PER HR: HCPCS

## 2023-05-12 PROCEDURE — 96372 THER/PROPH/DIAG INJ SC/IM: CPT

## 2023-05-12 PROCEDURE — 63600175 PHARM REV CODE 636 W HCPCS

## 2023-05-12 PROCEDURE — 37000008 HC ANESTHESIA 1ST 15 MINUTES: Performed by: UROLOGY

## 2023-05-12 PROCEDURE — 71000044 HC DOSC ROUTINE RECOVERY FIRST HOUR: Performed by: UROLOGY

## 2023-05-12 PROCEDURE — 94761 N-INVAS EAR/PLS OXIMETRY MLT: CPT

## 2023-05-12 PROCEDURE — 25000003 PHARM REV CODE 250

## 2023-05-12 DEVICE — STENT URETERAL UNIV 6FR 26CM
Type: IMPLANTABLE DEVICE | Site: URETER | Status: NON-FUNCTIONAL
Removed: 2023-07-07

## 2023-05-12 RX ORDER — DULOXETIN HYDROCHLORIDE 60 MG/1
60 CAPSULE, DELAYED RELEASE ORAL 2 TIMES DAILY
Status: DISCONTINUED | OUTPATIENT
Start: 2023-05-12 | End: 2023-05-13 | Stop reason: HOSPADM

## 2023-05-12 RX ORDER — PHENYLEPHRINE HYDROCHLORIDE 10 MG/ML
INJECTION INTRAVENOUS
Status: DISCONTINUED | OUTPATIENT
Start: 2023-05-12 | End: 2023-05-12

## 2023-05-12 RX ORDER — ACETAMINOPHEN 325 MG/1
650 TABLET ORAL EVERY 4 HOURS PRN
Status: DISCONTINUED | OUTPATIENT
Start: 2023-05-12 | End: 2023-05-13 | Stop reason: HOSPADM

## 2023-05-12 RX ORDER — ACETAMINOPHEN 10 MG/ML
1000 INJECTION, SOLUTION INTRAVENOUS ONCE
Status: COMPLETED | OUTPATIENT
Start: 2023-05-12 | End: 2023-05-12

## 2023-05-12 RX ORDER — ATORVASTATIN CALCIUM 40 MG/1
40 TABLET, FILM COATED ORAL DAILY
Status: DISCONTINUED | OUTPATIENT
Start: 2023-05-12 | End: 2023-05-13 | Stop reason: HOSPADM

## 2023-05-12 RX ORDER — GLUCAGON 1 MG
1 KIT INJECTION
Status: DISCONTINUED | OUTPATIENT
Start: 2023-05-12 | End: 2023-05-13 | Stop reason: HOSPADM

## 2023-05-12 RX ORDER — OXYCODONE HYDROCHLORIDE 5 MG/1
5 TABLET ORAL EVERY 4 HOURS PRN
Status: DISCONTINUED | OUTPATIENT
Start: 2023-05-12 | End: 2023-05-13 | Stop reason: HOSPADM

## 2023-05-12 RX ORDER — FENTANYL CITRATE 50 UG/ML
INJECTION, SOLUTION INTRAMUSCULAR; INTRAVENOUS
Status: DISCONTINUED | OUTPATIENT
Start: 2023-05-12 | End: 2023-05-12

## 2023-05-12 RX ORDER — OXYCODONE HYDROCHLORIDE 10 MG/1
10 TABLET ORAL EVERY 4 HOURS PRN
Status: DISCONTINUED | OUTPATIENT
Start: 2023-05-12 | End: 2023-05-13 | Stop reason: HOSPADM

## 2023-05-12 RX ORDER — AMLODIPINE BESYLATE 5 MG/1
10 TABLET ORAL DAILY
Status: DISCONTINUED | OUTPATIENT
Start: 2023-05-12 | End: 2023-05-13 | Stop reason: HOSPADM

## 2023-05-12 RX ORDER — SUCCINYLCHOLINE CHLORIDE 20 MG/ML
INJECTION INTRAMUSCULAR; INTRAVENOUS
Status: DISCONTINUED | OUTPATIENT
Start: 2023-05-12 | End: 2023-05-12

## 2023-05-12 RX ORDER — PROCHLORPERAZINE EDISYLATE 5 MG/ML
5 INJECTION INTRAMUSCULAR; INTRAVENOUS EVERY 6 HOURS PRN
Status: DISCONTINUED | OUTPATIENT
Start: 2023-05-12 | End: 2023-05-13 | Stop reason: HOSPADM

## 2023-05-12 RX ORDER — CARVEDILOL 25 MG/1
25 TABLET ORAL 2 TIMES DAILY
Status: DISCONTINUED | OUTPATIENT
Start: 2023-05-12 | End: 2023-05-13 | Stop reason: HOSPADM

## 2023-05-12 RX ORDER — INSULIN ASPART 100 [IU]/ML
0-5 INJECTION, SOLUTION INTRAVENOUS; SUBCUTANEOUS
Status: DISCONTINUED | OUTPATIENT
Start: 2023-05-12 | End: 2023-05-13 | Stop reason: HOSPADM

## 2023-05-12 RX ORDER — PANTOPRAZOLE SODIUM 40 MG/1
40 TABLET, DELAYED RELEASE ORAL DAILY
Status: DISCONTINUED | OUTPATIENT
Start: 2023-05-12 | End: 2023-05-13 | Stop reason: HOSPADM

## 2023-05-12 RX ORDER — LIDOCAINE HYDROCHLORIDE 20 MG/ML
INJECTION INTRAVENOUS
Status: DISCONTINUED | OUTPATIENT
Start: 2023-05-12 | End: 2023-05-12

## 2023-05-12 RX ORDER — ONDANSETRON 2 MG/ML
4 INJECTION INTRAMUSCULAR; INTRAVENOUS EVERY 6 HOURS PRN
Status: DISCONTINUED | OUTPATIENT
Start: 2023-05-12 | End: 2023-05-13 | Stop reason: HOSPADM

## 2023-05-12 RX ORDER — SODIUM CHLORIDE 0.9 % (FLUSH) 0.9 %
10 SYRINGE (ML) INJECTION
Status: DISCONTINUED | OUTPATIENT
Start: 2023-05-12 | End: 2023-05-12 | Stop reason: HOSPADM

## 2023-05-12 RX ORDER — IBUPROFEN 200 MG
16 TABLET ORAL
Status: DISCONTINUED | OUTPATIENT
Start: 2023-05-12 | End: 2023-05-13 | Stop reason: HOSPADM

## 2023-05-12 RX ORDER — CEFAZOLIN SODIUM 1 G/3ML
INJECTION, POWDER, FOR SOLUTION INTRAMUSCULAR; INTRAVENOUS
Status: DISCONTINUED | OUTPATIENT
Start: 2023-05-12 | End: 2023-05-12

## 2023-05-12 RX ORDER — DEXAMETHASONE SODIUM PHOSPHATE 4 MG/ML
INJECTION, SOLUTION INTRA-ARTICULAR; INTRALESIONAL; INTRAMUSCULAR; INTRAVENOUS; SOFT TISSUE
Status: DISCONTINUED | OUTPATIENT
Start: 2023-05-12 | End: 2023-05-12

## 2023-05-12 RX ORDER — ROPINIROLE 0.5 MG/1
0.5 TABLET, FILM COATED ORAL NIGHTLY
Status: DISCONTINUED | OUTPATIENT
Start: 2023-05-12 | End: 2023-05-13 | Stop reason: HOSPADM

## 2023-05-12 RX ORDER — PROPOFOL 10 MG/ML
VIAL (ML) INTRAVENOUS
Status: DISCONTINUED | OUTPATIENT
Start: 2023-05-12 | End: 2023-05-12

## 2023-05-12 RX ORDER — IBUPROFEN 200 MG
24 TABLET ORAL
Status: DISCONTINUED | OUTPATIENT
Start: 2023-05-12 | End: 2023-05-13 | Stop reason: HOSPADM

## 2023-05-12 RX ORDER — ROCURONIUM BROMIDE 10 MG/ML
INJECTION, SOLUTION INTRAVENOUS
Status: DISCONTINUED | OUTPATIENT
Start: 2023-05-12 | End: 2023-05-12

## 2023-05-12 RX ORDER — SODIUM CHLORIDE, SODIUM LACTATE, POTASSIUM CHLORIDE, CALCIUM CHLORIDE 600; 310; 30; 20 MG/100ML; MG/100ML; MG/100ML; MG/100ML
INJECTION, SOLUTION INTRAVENOUS CONTINUOUS
Status: DISCONTINUED | OUTPATIENT
Start: 2023-05-12 | End: 2023-05-12

## 2023-05-12 RX ORDER — ONDANSETRON 2 MG/ML
INJECTION INTRAMUSCULAR; INTRAVENOUS
Status: DISCONTINUED | OUTPATIENT
Start: 2023-05-12 | End: 2023-05-12

## 2023-05-12 RX ADMIN — ROPINIROLE HYDROCHLORIDE 0.5 MG: 0.5 TABLET, FILM COATED ORAL at 09:05

## 2023-05-12 RX ADMIN — OXYCODONE HYDROCHLORIDE 5 MG: 5 TABLET ORAL at 09:05

## 2023-05-12 RX ADMIN — ROCURONIUM BROMIDE 10 MG: 10 INJECTION INTRAVENOUS at 12:05

## 2023-05-12 RX ADMIN — PHENYLEPHRINE HYDROCHLORIDE 50 MCG: 10 INJECTION INTRAVENOUS at 12:05

## 2023-05-12 RX ADMIN — FENTANYL CITRATE 50 MCG: 50 INJECTION, SOLUTION INTRAMUSCULAR; INTRAVENOUS at 11:05

## 2023-05-12 RX ADMIN — GLYCOPYRROLATE 0.2 MG: 0.2 INJECTION, SOLUTION INTRAMUSCULAR; INTRAVENOUS at 11:05

## 2023-05-12 RX ADMIN — CARVEDILOL 25 MG: 25 TABLET, FILM COATED ORAL at 09:05

## 2023-05-12 RX ADMIN — CEFAZOLIN 2 G: 330 INJECTION, POWDER, FOR SOLUTION INTRAMUSCULAR; INTRAVENOUS at 11:05

## 2023-05-12 RX ADMIN — DEXAMETHASONE SODIUM PHOSPHATE 4 MG: 4 INJECTION INTRA-ARTICULAR; INTRALESIONAL; INTRAMUSCULAR; INTRAVENOUS; SOFT TISSUE at 12:05

## 2023-05-12 RX ADMIN — DULOXETINE 60 MG: 60 CAPSULE, DELAYED RELEASE ORAL at 09:05

## 2023-05-12 RX ADMIN — ROCURONIUM BROMIDE 5 MG: 10 INJECTION INTRAVENOUS at 11:05

## 2023-05-12 RX ADMIN — AMLODIPINE BESYLATE 10 MG: 5 TABLET ORAL at 04:05

## 2023-05-12 RX ADMIN — ACETAMINOPHEN 1000 MG: 10 INJECTION INTRAVENOUS at 11:05

## 2023-05-12 RX ADMIN — LIDOCAINE HYDROCHLORIDE 100 MG: 20 INJECTION INTRAVENOUS at 11:05

## 2023-05-12 RX ADMIN — FENTANYL CITRATE 25 MCG: 50 INJECTION, SOLUTION INTRAMUSCULAR; INTRAVENOUS at 11:05

## 2023-05-12 RX ADMIN — ATORVASTATIN CALCIUM 40 MG: 40 TABLET, FILM COATED ORAL at 04:05

## 2023-05-12 RX ADMIN — SUGAMMADEX 200 MG: 100 INJECTION, SOLUTION INTRAVENOUS at 01:05

## 2023-05-12 RX ADMIN — SUCCINYLCHOLINE CHLORIDE 140 MG: 20 INJECTION, SOLUTION INTRAMUSCULAR; INTRAVENOUS at 11:05

## 2023-05-12 RX ADMIN — ROCURONIUM BROMIDE 25 MG: 10 INJECTION INTRAVENOUS at 11:05

## 2023-05-12 RX ADMIN — INSULIN ASPART 1 UNITS: 100 INJECTION, SOLUTION INTRAVENOUS; SUBCUTANEOUS at 10:05

## 2023-05-12 RX ADMIN — DEXAMETHASONE SODIUM PHOSPHATE 4 MG: 4 INJECTION INTRA-ARTICULAR; INTRALESIONAL; INTRAMUSCULAR; INTRAVENOUS; SOFT TISSUE at 11:05

## 2023-05-12 RX ADMIN — PANTOPRAZOLE SODIUM 40 MG: 40 TABLET, DELAYED RELEASE ORAL at 04:05

## 2023-05-12 RX ADMIN — SODIUM CHLORIDE: 0.9 INJECTION, SOLUTION INTRAVENOUS at 11:05

## 2023-05-12 RX ADMIN — PROPOFOL 120 MG: 10 INJECTION, EMULSION INTRAVENOUS at 11:05

## 2023-05-12 RX ADMIN — ONDANSETRON 4 MG: 2 INJECTION INTRAMUSCULAR; INTRAVENOUS at 12:05

## 2023-05-12 NOTE — ANESTHESIA PREPROCEDURE EVALUATION
[facility]  Anesthesia Pre-Operative Evaluation       05/12/2023    Henrietta Villasenor is a 83 y.o. 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    Neuralgia, neuritis, and radiculitis, unspecified    Abdominal pain, unspecified site    Dermatitis due to drugs and medicines taken internally(693.0)    Dermatochalasis    Diverticulosis of large intestine    Personal history of allergy to sulfonamides    Diastolic congestive heart failure    Knee pain    Anemia    Chronic right ear pain    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    Hearing loss, sensorineural    Stage 3a chronic kidney disease    Coronary artery disease involving native coronary artery of native heart with angina pectoris    Lactose intolerance    Allergic rhinitis    Hyperlipidemia    Aortic atherosclerosis    Spinal enthesopathy of lumbar region    LLQ abdominal pain    Diverticulitis of large intestine without perforation or abscess without bleeding    Esophageal dysphagia    Fatty liver    Painful swallowing    Left facial numbness    Neck pain on left side    Type 2 diabetes mellitus, with long-term current use of insulin    Shortness of breath    Diabetic neuropathy associated with type 2 diabetes mellitus    Type 2 diabetes mellitus with stage 3 chronic kidney disease, with long-term current use of insulin    Obesity (BMI 30.0-34.9)    Low hemoglobin and low hematocrit    History of colon polyps    Type 2 diabetes mellitus with diabetic polyneuropathy, with long-term current use of insulin    Hyperparathyroidism, unspecified    Obesity, diabetes, and hypertension syndrome    Spinal stenosis of lumbar region with  neurogenic claudication    Senile nuclear sclerosis    Benign paroxysmal positional vertigo due to bilateral vestibular disorder    Tortuous aorta    Ectatic aorta    Insulin long-term use    Facet arthropathy    Mood disorder    Type 2 diabetes mellitus with hyperglycemia    Hypercholesterolemia    Hypovitaminosis D    Research study patient - TACT2 EDTA Chelation Study    History of MI (myocardial infarction)    Facial nerve palsy, secondary    Gout, arthritis    Vitreomacular adhesion, right    Epiretinal membrane, left    Chronic pain    Decreased ROM of lumbar spine    Weakness    Abnormality of gait and mobility    Decreased functional activity tolerance    Chest pain    Postmenopausal    Osteopenia    Thyroid nodule    Bilateral leg edema    Neuropathic pain    Hematoma of arm, left, initial encounter    Deep vein thrombosis (DVT) of popliteal vein of both lower extremities    PAD (peripheral artery disease)    Memory loss    Venous insufficiency of both lower extremities    Chronic pain disorder    Decreased activities of daily living (ADL)    Impaired functional mobility, balance, gait, and endurance    Left leg pain    Chronic bilateral low back pain with sciatica    At risk for falls    HANNAH (acute kidney injury)    Hydronephrosis with urinary obstruction due to ureteral calculus    History of DVT (deep vein thrombosis)    Hypoglycemia associated with diabetes    Hypomagnesemia    Hypokalemia       Review of patient's allergies indicates:   Allergen Reactions    Crestor [rosuvastatin]      Cramping in legs    Ezetimibe Diarrhea     Other reaction(s): abdominal pain, Diarrhea      Hydrocodone Itching    Lisinopril      Other reaction(s): cough      Sulfa (sulfonamide antibiotics) Itching and Rash           Sulfamethoxazole-trimethoprim     Valsartan Swelling       Current Outpatient Medications:    Current Facility-Administered Medications:     ceFAZolin 2  g in dextrose 5 % in water (D5W) 5 % 50 mL IVPB (MB+), 2 g, Intravenous, On Call Procedure, Clover García MD    Past Surgical History:   Procedure Laterality Date    BELPHAROPTOSIS REPAIR      s/p LAMBERTO levator repair - Dr. Dejesus    CARDIAC CATHETERIZATION      CARPAL TUNNEL RELEASE  3/13/2012    CATARACT EXTRACTION W/  INTRAOCULAR LENS IMPLANT Right 10/31/2018        CATARACT EXTRACTION W/  INTRAOCULAR LENS IMPLANT Left 03/22/2018        CHOLECYSTECTOMY      COLONOSCOPY N/A 11/16/2016    Procedure: COLONOSCOPY;  Surgeon: Chris Storey MD;  Location: [facility] (4TH FLR);  Service: Endoscopy;  Laterality: N/A;    COLONOSCOPY N/A 6/14/2017    Procedure: COLONOSCOPY;  Surgeon: Chris Storey MD;  Location: [facility] (4TH FLR);  Service: Endoscopy;  Laterality: N/A;  colonoscopy in 3 months with better bowel prep. - Split PEG prep ordered    CYSTOSCOPY  4/19/2023    Procedure: CYSTOSCOPY;  Surgeon: Domo Bates MD;  Location: [facility] 1ST FLR;  Service: Urology;;    ESOPHAGOGASTRODUODENOSCOPY N/A 11/10/2021    Procedure: EGD (ESOPHAGOGASTRODUODENOSCOPY);  Surgeon: Kuldeep Velázquez MD;  Location: [facility] (4TH FLR);  Service: Endoscopy;  Laterality: N/A;  11/4-eliquis hold ok see te -tb-fully vacc-inst mail and verbal-    EXTRACORPOREAL SHOCK WAVE LITHOTRIPSY      INJECTION OF ANESTHETIC AGENT AROUND NERVE Bilateral 6/24/2020    Procedure: BLOCK, NERVE, C4-C5-C6 MEDIAL BRANCH ok per [place] to add on;  Surgeon: Oscar Ravi MD;  Location: [facility] PAIN MGT;  Service: Pain Management;  Laterality: Bilateral;    INTRAOCULAR PROSTHESES INSERTION Right 10/31/2018    Procedure: INSERTION-INTRAOCULAR LENS (IOL);  Surgeon: Keysha Valle MD;  Location: [facility] OR;  Service: Ophthalmology;  Laterality: Right;    JOINT REPLACEMENT      mastoid tumor removal  1969    Left mastoidectomy with residual left facial weakness.    NEPHROSTOMY      OOPHORECTOMY      PHACOEMULSIFICATION OF CATARACT  Right 10/31/2018    Procedure: PHACOEMULSIFICATION-ASPIRATION-CATARACT;  Surgeon: Keysha Valle MD;  Location: [facility] OR;  Service: Ophthalmology;  Laterality: Right;    RETROGRADE PYELOGRAPHY Right 2023    Procedure: PYELOGRAM, RETROGRADE;  Surgeon: Domo Bates MD;  Location: [facility] OR 1ST FLR;  Service: Urology;  Laterality: Right;    TOTAL ABDOMINAL HYSTERECTOMY  1969    TAHBSO    TOTAL KNEE ARTHROPLASTY  2012    Left    TRANSFORAMINAL EPIDURAL INJECTION OF STEROID Bilateral 2019    Procedure: INJECTION, STEROID, EPIDURAL, TRANSFORAMINAL APPROACH;  Surgeon: Oscar Ravi MD;  Location: [facility] PAIN MGT;  Service: Pain Management;  Laterality: Bilateral;  B/L TFESI L4/L5    TRANSFORAMINAL EPIDURAL INJECTION OF STEROID Bilateral 10/2/2020    Procedure: INJECTION, STEROID, EPIDURAL, TRANSFORAMINAL APPROACH;  Surgeon: Oscar Ravi MD;  Location: [facility] PAIN MGT;  Service: Pain Management;  Laterality: Bilateral;  B/L TFESI L4/5    TRANSFORAMINAL EPIDURAL INJECTION OF STEROID Bilateral 2021    Procedure: INJECTION, STEROID, EPIDURAL, TRANSFORAMINAL APPROACH, L4-L5 clear to hold Eliquis 3 days;  Surgeon: Oscar Ravi MD;  Location: [facility] PAIN MGT;  Service: Pain Management;  Laterality: Bilateral;    URETERAL STENT PLACEMENT Right 2023    Procedure: INSERTION, STENT, URETER;  Surgeon: Domo Bates MD;  Location: [facility] OR 1ST FLR;  Service: Urology;  Laterality: Right;       Social History     Socioeconomic History    Marital status: Single    Number of children: 5   Tobacco Use    Smoking status: Former     Packs/day: 1.00     Years: 15.00     Pack years: 15.00     Types: Cigarettes     Quit date: 2000     Years since quittin.8    Smokeless tobacco: Never   Substance and Sexual Activity    Alcohol use: No    Drug use: No    Sexual activity: Never   Social History Narrative    Single with 5 children. Lives alone. Retired cook.     Social Determinants of  Health     Financial Resource Strain: Medium Risk    Difficulty of Paying Living Expenses: Somewhat hard   Food Insecurity: No Food Insecurity    Worried About Running Out of Food in the Last Year: Never true    Ran Out of Food in the Last Year: Never true   Transportation Needs: No Transportation Needs    Lack of Transportation (Medical): No    Lack of Transportation (Non-Medical): No   Physical Activity: Insufficiently Active    Days of Exercise per Week: 5 days    Minutes of Exercise per Session: 10 min   Stress: No Stress Concern Present    Feeling of Stress : Only a little   Social Connections: Unknown    Frequency of Communication with Friends and Family: Never    Frequency of Social Gatherings with Friends and Family: Never    Attends Buddhism Services: Never    Active Member of Clubs or Organizations: No    Attends Club or Organization Meetings: Never   Housing Stability: Low Risk     Unable to Pay for Housing in the Last Year: No    Number of Places Lived in the Last Year: 1    Unstable Housing in the Last Year: No       OBJECTIVE:     Vital Signs Range (Last 24H):         Significant Labs:  Lab Results   Component Value Date    WBC 11.50 04/26/2023    HGB 11.5 (L) 04/26/2023    HCT 36.7 (L) 04/26/2023     04/26/2023    CHOL 169 05/19/2021    TRIG 82 05/19/2021    HDL 45 05/19/2021    ALT 10 04/26/2023    AST 18 04/26/2023     05/02/2023    K 4.1 05/02/2023     05/02/2023    CREATININE 1.1 05/02/2023    BUN 13 05/02/2023    CO2 30 (H) 05/02/2023    TSH 1.433 04/18/2023    INR 1.1 08/16/2022    HGBA1C 8.2 (H) 03/08/2023       Diagnostic Studies: No relevant studies.    EKG:   Results for orders placed or performed during the hospital encounter of 04/18/23   EKG 12-lead    Collection Time: 04/18/23 10:35 PM    Narrative    Test Reason : R06.02,    Vent. Rate : 070 BPM     Atrial Rate : 070 BPM     P-R Int : 120 ms          QRS Dur : 124 ms      QT Int : 456 ms       P-R-T  "Axes : 057 -35 091 degrees     QTc Int : 492 ms    Normal sinus rhythm  Left axis deviation  Nonspecific intraventricular conduction delay  Possible IMI vs due to IVCD  Nonspecific ST and T wave abnormality  Abnormal ECG  When compared with ECG of 16-AUG-2022 21:57,  No significant change was found  Reconfirmed by King CABEZAS MD ([id number]) on 4/19/2023 8:29:14 AM    Referred By: [email or web address]   SELF           Confirmed By:King CABEZAS MD       2D ECHO:  TTE:  Results for orders placed or performed during the hospital encounter of 01/25/22   Echo   Result Value Ref Range    BSA 2.23 m2    TDI SEPTAL 0.03 m/s    LV LATERAL E/E' RATIO 10.50 m/s    LV SEPTAL E/E' RATIO 21.00 m/s    LA WIDTH 5.06 cm    TDI LATERAL 0.06 m/s    LVIDd 4.34 3.5 - 6.0 cm    IVS 1.29 (A) 0.6 - 1.1 cm    Posterior Wall 1.48 (A) 0.6 - 1.1 cm    LVIDs 3.03 2.1 - 4.0 cm    FS 30 28 - 44 %    LA volume 105.03 cm3    Sinus 3.58 cm    STJ 3.34 cm    Ascending aorta 3.49 cm    LV mass 231.63 g    LA size 4.60 cm    RVDD 3.36 cm    TAPSE 2.45 cm    RV S' 10.26 cm/s    Left Ventricle Relative Wall Thickness 0.68 cm    AV mean gradient 9 mmHg    AV valve area 2.46 cm2    AV Velocity Ratio 0.51     AV index (prosthetic) 0.59     MV valve area p 1/2 method 2.17 cm2    E/A ratio 0.61     Mean e' 0.05 m/s    E wave deceleration time 350.31 msec    IVRT 119.89 msec    MV "A" wave duration 18.55 msec    Pulm vein S/D ratio 1.79     LVOT diameter 2.31 cm    LVOT area 4.2 cm2    LVOT peak micah 1.06 m/s    LVOT peak VTI 24.32 cm    Ao peak micah 2.06 m/s    Ao VTI 41.48 cm    LVOT stroke volume 101.87 cm3    AV peak gradient 17 mmHg    E/E' ratio 14.00 m/s    MV Peak E Micah 0.63 m/s    TR Max Micah 2.59 m/s    MV stenosis pressure 1/2 time 101.59 ms    MV Peak A Micah 1.04 m/s    PV Peak S Micah 0.43 m/s    PV Peak D Micah 0.24 m/s    LV Systolic Volume 35.73 mL    LV Systolic Volume Index 16.5 mL/m2    LV Diastolic Volume 85.08 mL    LV Diastolic Volume Index 39.39 mL/m2    LA Volume " Index 48.6 mL/m2    LV Mass Index 107 g/m2    RA Major Axis 4.19 cm    Left Atrium Minor Axis 5.23 cm    Left Atrium Major Axis 5.39 cm    Triscuspid Valve Regurgitation Peak Gradient 27 mmHg    LA Volume Index (Mod) 44.1 mL/m2    LA volume (mod) 95.31 cm3    RA Width 2.97 cm    Right Atrial Pressure (from IVC) 8 mmHg    EF 55 %    TV rest pulmonary artery pressure 35 mmHg    Narrative    · The estimated ejection fraction is 55%.  · The left ventricle is normal in size with concentric hypertrophy and   normal systolic function.  · Grade I left ventricular diastolic dysfunction.  · Normal right ventricular size with normal right ventricular systolic   function.  · Moderate left atrial enlargement.  · There is right ventricular hypertrophy.  · Mild tricuspid regurgitation.  · The estimated PA systolic pressure is 35 mmHg.  · Intermediate central venous pressure (8 mmHg).          EVERARDO:  No results found. However, due to the size of the patient record, not all encounters were searched. Please check Results Review for a complete set of results.    ASSESSMENT/PLAN:                                                                                                                05/12/2023  Henrietta Villasenor is a 83 y.o., female.      Pre-op Assessment    I have reviewed the Patient Summary Reports.     I have reviewed the Nursing Notes. I have reviewed the NPO Status.   I have reviewed the Medications.     Review of Systems  Anesthesia Hx:  Denies Family Hx of Anesthesia complications.   Denies Personal Hx of Anesthesia complications.   Social:  Non-Smoker    Hematology/Oncology:  Hematology Normal   Oncology Normal     EENT/Dental:EENT/Dental Normal   Cardiovascular:   Hypertension Past MI CAD   Angina CHF    Pulmonary:   Sleep Apnea, CPAP    Renal/:   Chronic Renal Disease, CKD    Hepatic/GI:   PUD, Hiatal Hernia, GERD    Musculoskeletal:  Musculoskeletal Normal    Neurological:   Headaches    Endocrine:  Endocrine Normal     Dermatological:  Skin Normal    Psych:   depression          Physical Exam  General: Well nourished, Cooperative, Alert and Oriented    Airway:  Mallampati: III / II  Mouth Opening: Normal  TM Distance: Normal  Tongue: Normal  Neck ROM: Normal ROM    Dental:  Periodontal disease    Chest/Lungs:  Normal Respiratory Rate    Heart:  Rate: Normal    Abdomen:  Normal        Anesthesia Plan  Type of Anesthesia, risks & benefits discussed:    Anesthesia Type: Gen ETT  Intra-op Monitoring Plan: Standard ASA Monitors  Post Op Pain Control Plan: multimodal analgesia  Induction:  IV  Airway Plan: Direct, Post-Induction  Informed Consent: Informed consent signed with the Patient and all parties understand the risks and agree with anesthesia plan.  All questions answered.   ASA Score: 3  Day of Surgery Review of History & Physical: H&P Update referred to the surgeon/provider.    Ready For Surgery From Anesthesia Perspective.     .

## 2023-05-12 NOTE — INTERVAL H&P NOTE
The patient has been examined and the H&P has been reviewed:    I concur with the findings and no changes have occurred since H&P was written.    Procedure risks, benefits and alternative options discussed and understood by patient/family.    All benefits, risks, and alternatives were explained to the patient in great detail. All patient questions were addressed and the patient voiced clear understanding of our discussion. The patient has elected to undergo right URS.    The patient denies all recent n/v/d, fevers, chills, and illnesses.       There are no hospital problems to display for this patient.

## 2023-05-12 NOTE — ANESTHESIA PROCEDURE NOTES
Intubation    Date/Time: 5/12/2023 11:34 AM  Performed by: Isatu Rosenberg CRNA  Authorized by: Caryn Wills MD     Intubation:     Induction:  Intravenous    Mask Ventilation:  Easy with oral airway    Attempts:  1    Attempted By:  CRNA    Method of Intubation:  Video laryngoscopy    Blade:  Avina 3    Laryngeal View Grade: Grade I - full view of cords      Difficult Airway Encountered?: No      Complications:  None    Airway Device:  Oral endotracheal tube    Airway Device Size:  7.0    Style/Cuff Inflation:  Cuffed    Tube secured:  21    Secured at:  The lips    Placement Verified By:  Capnometry    Complicating Factors:  None    Findings Post-Intubation:  BS equal bilateral and atraumatic/condition of teeth unchanged

## 2023-05-12 NOTE — PLAN OF CARE
Patient was prepared for surgery.  Patient and niece spoke about concerns with doctors. Consents were verified.

## 2023-05-12 NOTE — TRANSFER OF CARE
"Anesthesia Transfer of Care Note    Patient: Henrietta Villasenor    Procedure(s) Performed: Procedure(s) (LRB):  CYSTOSCOPY (N/A)  URETEROSCOPY (Right)  LITHOTRIPSY, USING LASER (Right)  INSERTION, STENT, URETER (N/A)  REMOVAL, STENT, URETER (Right)  EXTRACTION - STONE (Right)  PYELOSCOPY (Right)    Patient location: PACU    Anesthesia Type: general    Transport from OR: Transported from OR on 6-10 L/min O2 by face mask with adequate spontaneous ventilation    Post pain: adequate analgesia    Post assessment: no apparent anesthetic complications    Post vital signs: stable    Level of consciousness: responds to stimulation and sedated    Nausea/Vomiting: no nausea/vomiting    Complications: none    Transfer of care protocol was followed      Last vitals:   Visit Vitals  BP (!) 144/74 (BP Location: Right arm, Patient Position: Lying)   Pulse (!) 59   Temp 36.4 °C (97.5 °F) (Oral)   Resp 20   Ht 5' 8" (1.727 m)   Wt 94.8 kg (209 lb)   LMP  (LMP Unknown)   SpO2 98%   Breastfeeding No   BMI 31.78 kg/m²     "

## 2023-05-12 NOTE — OP NOTE
[facility] - [facility]  Operative Note    Date: 05/12/2023    Pre-Op Diagnosis: right ureteral and renal stones    Patient Active Problem List    Diagnosis Date Noted    History of DVT (deep vein thrombosis) 04/26/2023    Hypoglycemia associated with diabetes 04/26/2023    Hypomagnesemia 04/26/2023    Hypokalemia 04/26/2023    HANNAH (acute kidney injury) 04/19/2023    Hydronephrosis with urinary obstruction due to ureteral calculus 04/19/2023    At risk for falls 12/27/2022    Left leg pain 04/07/2022    Chronic bilateral low back pain with sciatica 04/07/2022    Chronic pain disorder 12/07/2021    Decreased activities of daily living (ADL) 12/07/2021    Impaired functional mobility, balance, gait, and endurance 12/07/2021    Venous insufficiency of both lower extremities 04/06/2021    Memory loss 02/10/2021    Deep vein thrombosis (DVT) of popliteal vein of both lower extremities 02/04/2021    PAD (peripheral artery disease) 02/04/2021    Hematoma of arm, left, initial encounter 12/29/2020    Bilateral leg edema 12/05/2020    Neuropathic pain 12/05/2020    Thyroid nodule 07/16/2020    Osteopenia 07/07/2020    Postmenopausal 07/02/2020    Chest pain 01/09/2020    Decreased ROM of lumbar spine 12/10/2019    Weakness 12/10/2019    Abnormality of gait and mobility 12/10/2019    Decreased functional activity tolerance 12/10/2019    Chronic pain 09/17/2019    Vitreomacular adhesion, right 06/04/2019    Epiretinal membrane, left 06/04/2019    Gout, arthritis 05/10/2019    Type 2 diabetes mellitus with hyperglycemia 11/29/2018    Hypercholesterolemia 11/29/2018    Hypovitaminosis D 11/29/2018    Research study patient - TACT2 EDTA Chelation Study 11/29/2018    History of MI (myocardial infarction) 11/29/2018    Facial nerve palsy, secondary 11/29/2018    Mood disorder 08/15/2018    Tortuous aorta 06/05/2018    Ectatic aorta 06/05/2018    Insulin long-term use 06/05/2018    Facet arthropathy 06/05/2018    Benign  paroxysmal positional vertigo due to bilateral vestibular disorder 05/18/2018    Senile nuclear sclerosis 03/22/2018    Spinal stenosis of lumbar region with neurogenic claudication 02/23/2018    Type 2 diabetes mellitus with diabetic polyneuropathy, with long-term current use of insulin 08/20/2017    Obesity, diabetes, and hypertension syndrome 08/20/2017    Hyperparathyroidism, unspecified     History of colon polyps 06/14/2017    Low hemoglobin and low hematocrit 11/16/2016    Type 2 diabetes mellitus with stage 3 chronic kidney disease, with long-term current use of insulin 08/31/2016    Obesity (BMI 30.0-34.9) 08/31/2016    Diabetic neuropathy associated with type 2 diabetes mellitus 08/26/2016    Shortness of breath     Type 2 diabetes mellitus, with long-term current use of insulin 05/11/2016    Painful swallowing 02/17/2016    Left facial numbness     Neck pain on left side     Esophageal dysphagia 09/15/2015    Fatty liver 09/15/2015    Diverticulitis of large intestine without perforation or abscess without bleeding 07/27/2015    LLQ abdominal pain 07/24/2015    Hyperlipidemia 06/19/2015    Aortic atherosclerosis 06/19/2015    Spinal enthesopathy of lumbar region 06/19/2015    Allergic rhinitis 05/05/2015    Lactose intolerance 06/03/2014    Stage 3a chronic kidney disease 05/14/2014    Coronary artery disease involving native coronary artery of native heart with angina pectoris 05/14/2014    Hearing loss, sensorineural 03/20/2014    Chronic right ear pain 03/07/2014    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 03/07/2014    Anemia 11/07/2013    Knee pain 04/09/2013    Diastolic congestive heart failure 09/12/2012    Gastroesophageal reflux disease without esophagitis 07/29/2012    Hydradenitis 07/29/2012    Migraines, neuralgic 07/29/2012    Chronic back pain 07/29/2012    Essential hypertension     MARLON on CPAP     Nephrolithiasis     Personal history of allergy to sulfonamides 09/20/2011    Abdominal pain, unspecified site  07/26/2011    Dermatitis due to drugs and medicines taken internally(693.0) 05/25/2011    Ureteral calculus 02/22/2011    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 Right ureteroscopy with laser lithotripsy and stone basketing  2. Cystoscopy  3. Right pyeloscopy  4. Right ureteral stent exchange  5. Fluoro < 1 hr    Specimen(s): none    Staff Surgeon: Laz Rudolph MD    Assistant Surgeon: Guanako Raymond MD, Clover García MD    Anesthesia: General endotracheal anesthesia    Indications: Henrietta Villasenor is a 83 y.o. female with a right ureteral and renal stones presenting for definitive stone management. She is pre-stented.    Findings:  1. Stone is visible on  film.  2. Right ureteral stone seen in the distal ureter, lased and basketed out to completion  3. Right renal lower pole stone visualized, lased/dusted to completion  4. Significant bleeding noted from a laser injury to the lower pole, visualization lost within the right kidney  5. Bladder irrigated with the resectoscope, though no clot was noted, only bright red urine without clear visualization of the bladder  6. Right ureteral stent placed under fluoro   7. Patient started on CBI, urine noted to be clear yellow on high speed CBI at the end of the case    Estimated Blood Loss: min    Drains:   1. Right 6 Fr x 26 cm JJ ureteral stent without strings  2. 26Fr 3-way rachel     Procedure in detail:  After risks, benefits, and possible complications were explained, the patient elected to undergo the procedure and informed consent was obtained. All questions were answered in the ольга-operative area. The patient was transferred to the cystoscopy suite and placed in the supine position.  SCDs were applied and working. Anesthesia was administered. The patient was then placed in the dorsal lithotomy position and prepped and draped in the usual sterile fashion. Time out was performed, and ольга-procedural antibiotics were confirmed.     The stone was visible on  film. A rigid cystoscope in a 22 Fr sheath was introduced into the patient's urethra. This passed easily. The entire urethra was visualized which showed no strictures or masses. Cystoscopy revealed the ureteral orifices in the normal anatomic location bilaterally.    The patient's right ureteral stent was grasped with alligator graspers and brought to the meatus. A motion wire was passed through the stent and into the kidney with fluoroscopic confirmation. The stent was was removed keeping the wire in place.    An 8 Fr rigid ureteroscope was passed into the patient's bladder alongside the wire under direct vision. It was then passed through the right ureteral orifice alongside the wire. A stone was encountered at the level of the distal ureter.  A 272 micron Thulium laser fiber was passed through the ureteroscope. The stone was fragmented/dusted using the laser. The laser fiber was removed and an NCircle basket was introduced through the ureteroscope. Stone fragments were removed and placed in the bladder. The ureteroscope was reinserted and the entire length of the ureter was surveyed and no additional stone fragments were visualized.    A stiff glide wire was inserted through the ureteroscope and into the kidney with fluoroscopic confirmation. The ureteroscope was removed keeping both wires in place.    An 8 Fr flexible ureteroscope was advanced over the stiff glide wire to the level of the renal pelvis under continuous fluoroscopy. This passed easily. The stiff glide wire was removed.    A stone was encountered in lower pole. The laser fiber was inserted per the scope and the stones were fragmented/dusted to completion. Systematic  pyeloscopy was performed and all remaining stone fragments were deemed small enough to pass spontaneously, <1 mm. Significant lower pole bleeding was noted from a parenchymal laser injury and visualization was lost within the kidney and ureter. The scope was removed keeping the motion wire in place.    The cystoscope was reinserted and the bladder was irrigated to remove the stone fragments. The bladder was drained and the cystoscope removed keeping the wire in place irrigated again with return of bright red urine without clot. Visualization was lost within the bladder. A resectoscope (28Fr) was then placed in the bladder with continuous flow to irrigate the bladder, again, visualization was poor and a decision was made to the start the patient on CBI.     A 6 Fr x 26 cm JJ ureteral stent without strings was passed over the wire and up into the renal pelvis using fluoro. When the coil appeared to be in good position in the kidney and the radio-opaque marker of the pusher was at the inferior pubis, the wire was removed under continuous fluoro. Good coils were seen in the kidney and the bladder using fluoro.    A 26Fr 3-way rachel was then placed with 30cc in the balloon and was then connected to CBI. The rachel was irrigated without return of clot. The urine appeared clear yellow without clots while on fast CBI at the end of the case.    The patient tolerated the procedure well and was transferred to the recovery room in stable condition.    Disposition:  The patient will be admitted to Urology obs while on CBI and for CBC checks.     Guanako Raymond MD

## 2023-05-13 VITALS
OXYGEN SATURATION: 97 % | BODY MASS INDEX: 31.67 KG/M2 | HEART RATE: 68 BPM | WEIGHT: 209 LBS | RESPIRATION RATE: 18 BRPM | DIASTOLIC BLOOD PRESSURE: 63 MMHG | SYSTOLIC BLOOD PRESSURE: 134 MMHG | HEIGHT: 68 IN | TEMPERATURE: 97 F

## 2023-05-13 LAB
ANION GAP SERPL CALC-SCNC: 13 MMOL/L (ref 8–16)
BASOPHILS # BLD AUTO: 0.02 K/UL (ref 0–0.2)
BASOPHILS NFR BLD: 0.1 % (ref 0–1.9)
BUN SERPL-MCNC: 18 MG/DL (ref 8–23)
CALCIUM SERPL-MCNC: 9.3 MG/DL (ref 8.7–10.5)
CHLORIDE SERPL-SCNC: 104 MMOL/L (ref 95–110)
CO2 SERPL-SCNC: 20 MMOL/L (ref 23–29)
CREAT SERPL-MCNC: 1.3 MG/DL (ref 0.5–1.4)
DIFFERENTIAL METHOD: ABNORMAL
EOSINOPHIL # BLD AUTO: 0 K/UL (ref 0–0.5)
EOSINOPHIL NFR BLD: 0.1 % (ref 0–8)
ERYTHROCYTE [DISTWIDTH] IN BLOOD BY AUTOMATED COUNT: 14.6 % (ref 11.5–14.5)
EST. GFR  (NO RACE VARIABLE): 40.8 ML/MIN/1.73 M^2
GLUCOSE SERPL-MCNC: 270 MG/DL (ref 70–110)
HCT VFR BLD AUTO: 29.4 % (ref 37–48.5)
HGB BLD-MCNC: 9.7 G/DL (ref 12–16)
IMM GRANULOCYTES # BLD AUTO: 0.14 K/UL (ref 0–0.04)
IMM GRANULOCYTES NFR BLD AUTO: 0.8 % (ref 0–0.5)
LYMPHOCYTES # BLD AUTO: 1 K/UL (ref 1–4.8)
LYMPHOCYTES NFR BLD: 6 % (ref 18–48)
MCH RBC QN AUTO: 27.3 PG (ref 27–31)
MCHC RBC AUTO-ENTMCNC: 33 G/DL (ref 32–36)
MCV RBC AUTO: 83 FL (ref 82–98)
MONOCYTES # BLD AUTO: 1 K/UL (ref 0.3–1)
MONOCYTES NFR BLD: 5.6 % (ref 4–15)
NEUTROPHILS # BLD AUTO: 14.7 K/UL (ref 1.8–7.7)
NEUTROPHILS NFR BLD: 87.4 % (ref 38–73)
NRBC BLD-RTO: 0 /100 WBC
PLATELET # BLD AUTO: 234 K/UL (ref 150–450)
PMV BLD AUTO: 10 FL (ref 9.2–12.9)
POCT GLUCOSE: 190 MG/DL (ref 70–110)
POCT GLUCOSE: 260 MG/DL (ref 70–110)
POTASSIUM SERPL-SCNC: 4.3 MMOL/L (ref 3.5–5.1)
RBC # BLD AUTO: 3.55 M/UL (ref 4–5.4)
SODIUM SERPL-SCNC: 137 MMOL/L (ref 136–145)
WBC # BLD AUTO: 16.85 K/UL (ref 3.9–12.7)

## 2023-05-13 PROCEDURE — 80048 BASIC METABOLIC PNL TOTAL CA: CPT

## 2023-05-13 PROCEDURE — 82962 GLUCOSE BLOOD TEST: CPT

## 2023-05-13 PROCEDURE — 94761 N-INVAS EAR/PLS OXIMETRY MLT: CPT

## 2023-05-13 PROCEDURE — 94799 UNLISTED PULMONARY SVC/PX: CPT

## 2023-05-13 PROCEDURE — G0378 HOSPITAL OBSERVATION PER HR: HCPCS

## 2023-05-13 PROCEDURE — 36415 COLL VENOUS BLD VENIPUNCTURE: CPT

## 2023-05-13 PROCEDURE — 36415 COLL VENOUS BLD VENIPUNCTURE: CPT | Performed by: UROLOGY

## 2023-05-13 PROCEDURE — 99285 EMERGENCY DEPT VISIT HI MDM: CPT

## 2023-05-13 PROCEDURE — 85025 COMPLETE CBC W/AUTO DIFF WBC: CPT | Performed by: UROLOGY

## 2023-05-13 PROCEDURE — 25000003 PHARM REV CODE 250

## 2023-05-13 RX ORDER — TAMSULOSIN HYDROCHLORIDE 0.4 MG/1
0.4 CAPSULE ORAL NIGHTLY
Qty: 30 CAPSULE | Refills: 0 | Status: SHIPPED | OUTPATIENT
Start: 2023-05-13 | End: 2023-07-05

## 2023-05-13 RX ADMIN — PANTOPRAZOLE SODIUM 40 MG: 40 TABLET, DELAYED RELEASE ORAL at 09:05

## 2023-05-13 RX ADMIN — ATORVASTATIN CALCIUM 40 MG: 40 TABLET, FILM COATED ORAL at 09:05

## 2023-05-13 RX ADMIN — CARVEDILOL 25 MG: 25 TABLET, FILM COATED ORAL at 09:05

## 2023-05-13 RX ADMIN — DULOXETINE 60 MG: 60 CAPSULE, DELAYED RELEASE ORAL at 09:05

## 2023-05-13 NOTE — PATIENT INSTRUCTIONS
Continue to hold your apixaban (Eliquis) until Wednesday, 5/17/23.    Post Cystoscopy Instructions  Do not strain to have a bowel movement  No strenuous exercise x 7 days  No driving while you are on narcotic pain medications or if your rachel  catheter is in place    You can expect:  To pass stone fragments if you had a stone procedure  Have pain when you void from your stent if you have a stent in place  See blood in your urine if you have a stent in place    If you have a catheter, please return to the ER if your catheter stops draining or you are having abdominal pain.    Call the doctor if:  Temperature is greater than 101F  Persistent vomiting and inability to keep food down  Inability to void if you do not have a catheter

## 2023-05-13 NOTE — ASSESSMENT & PLAN NOTE
Henrietta Villasenor is an 83 y.o. female with a history of a right ureteral and right lower pole renal stone who underwent right ureteroscopy with laser lithotripsy on 5/12/23. During the case, significant bleeding from the right lower pole of the kidney so she was admitted. POD 1.    - Ambulate qid  - Strict I's/O's  - Drains:    - Alejandro: removed on rounds, f/u VT   - Right ureteral stent without strings, maintain  - Diabetic diet, SSI  - HLIV  - Home meds, hold Eliquis until 5/17  - Pain control  - PPx: SCD's/BRANT's/    Dispo: home today, f/u 3 weeks with Dr. Rudolph with KUB and renal US.

## 2023-05-13 NOTE — SUBJECTIVE & OBJECTIVE
Interval History: No acute events overnight. No issues with Alejandro drainage overnight. CBI clamped yesterday afternoon. Alejandro removed on rounds this AM. Overall feels well. UOP good. Afebrile, vital signs stable. Hgb 9.7 from 10.3 overnight.       Objective:     Temp:  [97 °F (36.1 °C)-98.8 °F (37.1 °C)] 98.8 °F (37.1 °C)  Pulse:  [55-79] 77  Resp:  [13-24] 16  SpO2:  [91 %-98 %] 94 %  BP: (113-159)/(56-78) 123/60     Body mass index is 31.78 kg/m².           Drains       None                    Physical Exam  Vitals and nursing note reviewed.   Constitutional:       Appearance: Normal appearance.   HENT:      Head: Normocephalic and atraumatic.      Nose: Nose normal.      Mouth/Throat:      Mouth: Mucous membranes are moist.   Eyes:      Pupils: Pupils are equal, round, and reactive to light.   Cardiovascular:      Rate and Rhythm: Normal rate.   Pulmonary:      Effort: Pulmonary effort is normal. No respiratory distress.   Abdominal:      General: Abdomen is flat. There is no distension.      Tenderness: There is no abdominal tenderness.   Genitourinary:     Comments: 3 way Alejandro in place, CBI clamped, draining clear light red urine without clots.  Skin:     General: Skin is warm and dry.   Neurological:      General: No focal deficit present.      Mental Status: She is alert and oriented to person, place, and time.   Psychiatric:         Mood and Affect: Mood normal.         Behavior: Behavior normal.         Thought Content: Thought content normal.         Judgment: Judgment normal.         Significant Labs:    BMP:  Recent Labs   Lab 05/12/23  1357 05/13/23  0355    137   K 4.6 4.3    104   CO2 22* 20*   BUN 15 18   CREATININE 1.2 1.3   CALCIUM 9.3 9.3       CBC:   Recent Labs   Lab 05/12/23  1357 05/13/23  0549   WBC 8.06 16.85*   HGB 10.3* 9.7*   HCT 32.2* 29.4*    234       All pertinent labs results from the past 24 hours have been reviewed.    Significant Imaging:  All pertinent imaging  results/findings from the past 24 hours have been reviewed.

## 2023-05-13 NOTE — PROGRESS NOTES
[facility] - [facility]  Urology  Progress Note    Patient Name: Henrietta Villasenor  MRN: [id number]  Admission Date: 5/12/2023  Hospital Length of Stay: 0 days  Code Status: Prior   Attending Provider: Laz Rudolph Jr., MD   Primary Care Physician: Jeannine Arriaga MD    Subjective:     HPI:  Henrietta Villasenor is an 83 y.o. female with a history of a right ureteral and right lower pole renal stone who underwent right ureteroscopy with laser lithotripsy on 5/12/23. During the case, significant bleeding from the right lower pole of the kidney so she was admitted.        Interval History: No acute events overnight. No issues with Alejandro drainage overnight. CBI clamped yesterday afternoon. Alejandro removed on rounds this AM. Overall feels well. UOP good. Afebrile, vital signs stable. Hgb 9.7 from 10.3 overnight.       Objective:     Temp:  [97 °F (36.1 °C)-98.8 °F (37.1 °C)] 98.8 °F (37.1 °C)  Pulse:  [55-79] 77  Resp:  [13-24] 16  SpO2:  [91 %-98 %] 94 %  BP: (113-159)/(56-78) 123/60     Body mass index is 31.78 kg/m².           Drains       None                    Physical Exam  Vitals and nursing note reviewed.   Constitutional:       Appearance: Normal appearance.   HENT:      Head: Normocephalic and atraumatic.      Nose: Nose normal.      Mouth/Throat:      Mouth: Mucous membranes are moist.   Eyes:      Pupils: Pupils are equal, round, and reactive to light.   Cardiovascular:      Rate and Rhythm: Normal rate.   Pulmonary:      Effort: Pulmonary effort is normal. No respiratory distress.   Abdominal:      General: Abdomen is flat. There is no distension.      Tenderness: There is no abdominal tenderness.   Genitourinary:     Comments: 3 way Alejandro in place, CBI clamped, draining clear light red urine without clots.  Skin:     General: Skin is warm and dry.   Neurological:      General: No focal deficit present.      Mental Status: She is alert and oriented to person, place, and time.   Psychiatric:         Mood and Affect: Mood  normal.         Behavior: Behavior normal.         Thought Content: Thought content normal.         Judgment: Judgment normal.         Significant Labs:    BMP:  Recent Labs   Lab 05/12/23  1357 05/13/23  0355    137   K 4.6 4.3    104   CO2 22* 20*   BUN 15 18   CREATININE 1.2 1.3   CALCIUM 9.3 9.3       CBC:   Recent Labs   Lab 05/12/23  1357 05/13/23  0549   WBC 8.06 16.85*   HGB 10.3* 9.7*   HCT 32.2* 29.4*    234       All pertinent labs results from the past 24 hours have been reviewed.    Significant Imaging:  All pertinent imaging results/findings from the past 24 hours have been reviewed.                    Assessment/Plan:     * Ureteral calculus  Henrietta Villasenor is an 83 y.o. female with a history of a right ureteral and right lower pole renal stone who underwent right ureteroscopy with laser lithotripsy on 5/12/23. During the case, significant bleeding from the right lower pole of the kidney so she was admitted. POD 1.    - Ambulate qid  - Strict I's/O's  - Drains:    - Alejandro: removed on rounds, f/u VT   - Right ureteral stent without strings, maintain  - Diabetic diet, SSI  - HLIV  - Home meds, hold Eliquis until 5/17  - Pain control  - PPx: SCD's/BRANT's    Dispo: home today pending voiding trial, f/u 3 weeks with Dr. Rudolph with KUB and renal US.        VTE Risk Mitigation (From admission, onward)           Ordered     Place RBANT hose  Until discontinued         05/12/23 1313     Place sequential compression device  Until discontinued         05/12/23 1313     Place sequential compression device  Until discontinued         05/12/23 1033                    Abdulaziz Ackerman MD  Urology  [facility] - Surgery

## 2023-05-13 NOTE — PLAN OF CARE
[facility] - Surgery  Discharge Assessment    Primary Care Provider: Jeannine Arriaga MD     On call  obtained discharge planning assessment with patient.  Patient does not anticipate any home needs.      [facility]  [address]  [facility] [address]  Phone: [phone] Fax: [phone]    [id number] - [place], [place] - [address] AT [facility] [address]  [address]  [facility] [address]  Phone: [phone] Fax: [phone]    [facility] - [place], [place] - [address] [facility]  [address] [facility] [address]  Phone: [phone] Fax: [phone]    [id number] - [place], [place] - [address]  [address]  [place] [address]  Phone: [phone] Fax: [phone]       Discharge Assessment (most recent)       BRIEF DISCHARGE ASSESSMENT - 05/13/23 0928          Discharge Planning    Assessment Type Discharge Planning Brief Assessment     Resource/Environmental Concerns none     Support Systems Family members     Equipment Currently Used at Home cane, straight;walker, rolling     Current Living Arrangements home     Patient/Family Anticipates Transition to home     Patient/Family Anticipated Services at Transition none     DME Needed Upon Discharge  none     Discharge Plan A Home     Discharge Plan B Home

## 2023-05-13 NOTE — HPI
Henrietta Villasenor is an 83 y.o. female with a history of a right ureteral and right lower pole renal stone who underwent right ureteroscopy with laser lithotripsy on 5/12/23. During the case, significant bleeding from the right lower pole of the kidney so she was admitted.

## 2023-05-14 NOTE — DISCHARGE SUMMARY
[facility] - Surgery  Urology  Discharge Summary      Patient Name: Henrietta Villasenor  MRN: [id number]  Admission Date: 5/12/2023  Hospital Length of Stay: 0 days  Discharge Date and Time: 5/13/2023  1:26 PM  Attending Physician: Laz Rudolph MD  Discharging Provider: Abdulaziz Ackerman MD  Primary Care Physician: Jeannine Arriaga MD    HPI:   Henrietta Villasenor is an 83 y.o. female with a history of a right ureteral and right lower pole renal stone who underwent right ureteroscopy with laser lithotripsy on 5/12/23. During the case, significant bleeding from the right lower pole of the kidney so she was admitted.        Procedure(s) (LRB):  CYSTOSCOPY (N/A)  URETEROSCOPY (Right)  LITHOTRIPSY, USING LASER (Right)  INSERTION, STENT, URETER (N/A)  REMOVAL, STENT, URETER (Right)  EXTRACTION - STONE (Right)  PYELOSCOPY (Right)     Indwelling Lines/Drains at time of discharge:   Lines/Drains/Airways     None                 Hospital Course (synopsis of major diagnoses, care, treatment, and services provided during the course of the hospital stay): Patient underwent right ureteroscopy with laser lithotripsy on 5/12/23. She was admitted post-op due to significant bleeding from the right lower pole of the kidney. She was on continuous bladder irrigation until 5/12 afternoon after which it was clamped. Her 3-way Alejandro remained in place until the AM of 5/13 and was draining clear light red urine without clots so it was removed. She passed a voiding trial. Her Hgb overall remained stable and she was hemodynamically stable. She was instructed to hold her Eliquis until 5/17. She was deemed safe for discharge home on 5/13.    Goals of Care Treatment Preferences:  Code Status: Full Code      Consults: N/A    Significant Diagnostic Studies: See chart review.    Pending Diagnostic Studies:     None          Final Active Diagnoses:    Diagnosis Date Noted POA    PRINCIPAL PROBLEM:  Ureteral calculus [N20.1] 02/22/2011 Yes      Problems Resolved  During this Admission:         Discharged Condition: good    Disposition: Home or Self Care    Follow Up:   Follow-up Information     Laz Rudolph Jr, MD Follow up in 3 week(s).    Specialty: Urology  Why: Urology follow up with x-ray and ultrasound before.  Contact information:  [address]  [facility] [id number]  [phone]                       Patient Instructions:      X-Ray KUB   Standing Status: Future Standing Exp. Date: 05/13/24     Order Specific Question Answer Comments   Reason for Exam: Nephrolithiasis    May the Radiologist modify the order per protocol to meet the clinical needs of the patient? Yes    Release to patient Immediate      US Kidney   Standing Status: Future Standing Exp. Date: 05/13/24     Order Specific Question Answer Comments   Reason for Exam: S/p right ureteroscopy    May the Radiologist modify the order per protocol to meet the clinical needs of the patient? Yes    Release to patient Immediate      Diet diabetic     Notify your health care provider if you experience any of the following:  temperature >100.4     Notify your health care provider if you experience any of the following:  persistent nausea and vomiting or diarrhea     Notify your health care provider if you experience any of the following:  severe uncontrolled pain     Notify your health care provider if you experience any of the following:  difficulty breathing or increased cough     Notify your health care provider if you experience any of the following:  severe persistent headache     Notify your health care provider if you experience any of the following:  worsening rash     Notify your health care provider if you experience any of the following:  persistent dizziness, light-headedness, or visual disturbances     Notify your health care provider if you experience any of the following:  increased confusion or weakness     Activity as tolerated     Medications:  Reconciled Home Medications:      Medication  "List      START taking these medications    tamsulosin 0.4 mg Cap  Commonly known as: FLOMAX  Take 1 capsule (0.4 mg total) by mouth every evening.        CONTINUE taking these medications    ACCU-CHEK GUIDE TEST STRIPS Strp  Generic drug: blood sugar diagnostic  TEST BLOOD SUGAR THREE TIMES DAILY     ACCU-CHEK SOFT DEV LANCETS Kit  Generic drug: lancing device with lancets     albuterol 90 mcg/actuation inhaler  Commonly known as: PROVENTIL/VENTOLIN HFA  Inhale 2 puffs by mouth into the lungs every 4 (four) hours as needed for Wheezing. Rescue     amLODIPine 10 MG tablet  Commonly known as: NORVASC  Take 1 tablet (10 mg total) by mouth once daily.     apixaban 5 mg Tab  Commonly known as: ELIQUIS  Take 1 tablet (5 mg total) by mouth 2 (two) times daily.     atorvastatin 40 MG tablet  Commonly known as: LIPITOR  Take 40 mg by mouth once daily.     * BD ULTRA-FINE OLU PEN NEEDLE 32 gauge x 5/32" Ndle  Generic drug: pen needle, diabetic  Uses 4 times daily, on multiple daily insulin injections     * DROPLET PEN NEEDLE 29 gauge x 1/2" Ndle  Generic drug: pen needle, diabetic  USE FOUR TIMES DAILY     blood-glucose meter kit  Use as instructed     carvediloL 25 MG tablet  Commonly known as: COREG  Take 1 tablet (25 mg total) by mouth 2 (two) times daily.     cholecalciferol (vitamin D3) 125 mcg (5,000 unit) capsule  Take 5,000 Units by mouth once daily.     DROPSAFE ALCOHOL PREP PADS Padm  Generic drug: alcohol swabs  USE TOPICALLY TO TEST BLOOD SUGAR FOUR TIMES DAILY     DULoxetine 60 MG capsule  Commonly known as: CYMBALTA  Take 1 capsule (60 mg total) by mouth 2 (two) times daily. Please hold until you follow up with your primary care provider     fluticasone propionate 50 mcg/actuation nasal spray  Commonly known as: FLONASE  Instill 2 sprays in each nostril once daily.     insulin aspart U-100 100 unit/mL (3 mL) Inpn pen  Commonly known as: NovoLOG  Inject 24 Units into the skin before breakfast AND 28 Units daily " with lunch AND 28 Units before dinner.     lancets Misc  Commonly known as: ACCU-CHEK SOFTCLIX LANCETS  TEST BLOOD SUGAR FOUR TIMES DAILY     LANTUS SOLOSTAR U-100 INSULIN glargine 100 units/mL SubQ pen  Generic drug: insulin  Inject 60 Units into the skin once daily.     loratadine 10 mg tablet  Commonly known as: CLARITIN  Take 1 tablet (10 mg total) by mouth once daily.     olopatadine 0.2 % Drop  Commonly known as: PATADAY  Place 1 drop into both eyes once daily.     oxyCODONE-acetaminophen  mg per tablet  Commonly known as: PERCOCET  Take 1 tablet by mouth 5 (five) times daily.     pantoprazole 40 MG tablet  Commonly known as: PROTONIX  Take 1 tablet (40 mg total) by mouth once daily.     rOPINIRole 0.5 MG tablet  Commonly known as: REQUIP  Take 1 tablet (0.5 mg total) by mouth every evening.         * This list has 2 medication(s) that are the same as other medications prescribed for you. Read the directions carefully, and ask your doctor or other care provider to review them with you.                Time spent on the discharge of patient: 20 minutes    Abdulaziz Ackerman MD  Urology  [facility] - Surgery

## 2023-05-15 NOTE — ANESTHESIA POSTPROCEDURE EVALUATION
Anesthesia Post Evaluation    Patient: Henrietta Villasenor    Procedure(s) Performed: Procedure(s) (LRB):  CYSTOSCOPY (N/A)  URETEROSCOPY (Right)  LITHOTRIPSY, USING LASER (Right)  INSERTION, STENT, URETER (N/A)  REMOVAL, STENT, URETER (Right)  EXTRACTION - STONE (Right)  PYELOSCOPY (Right)    Final Anesthesia Type: general      Patient location during evaluation: PACU  Patient participation: Yes- Able to Participate  Level of consciousness: awake and alert  Post-procedure vital signs: reviewed and stable  Pain management: adequate  Airway patency: patent    PONV status at discharge: No PONV  Anesthetic complications: no      Cardiovascular status: blood pressure returned to baseline  Respiratory status: unassisted  Hydration status: euvolemic  Follow-up not needed.          Vitals Value Taken Time   /61 05/12/23 1547   Temp 36.5 °C (97.7 °F) 05/12/23 1325   Pulse 62 05/12/23 1555   Resp 14 05/12/23 1555   SpO2 96 % 05/12/23 1555   Vitals shown include unvalidated device data.      No case tracking events are documented in the log.      Pain/Annette Score: No data recorded

## 2023-05-16 ENCOUNTER — OFFICE VISIT (OUTPATIENT)
Dept: INTERNAL MEDICINE | Facility: CLINIC | Age: 83
End: 2023-05-16
Payer: MEDICARE

## 2023-05-16 VITALS
HEART RATE: 65 BPM | BODY MASS INDEX: 31.5 KG/M2 | WEIGHT: 207.88 LBS | SYSTOLIC BLOOD PRESSURE: 128 MMHG | HEIGHT: 68 IN | DIASTOLIC BLOOD PRESSURE: 60 MMHG | OXYGEN SATURATION: 98 %

## 2023-05-16 DIAGNOSIS — E78.2 MIXED HYPERLIPIDEMIA: ICD-10-CM

## 2023-05-16 DIAGNOSIS — Z79.4 TYPE 2 DIABETES MELLITUS WITHOUT COMPLICATION, WITH LONG-TERM CURRENT USE OF INSULIN: ICD-10-CM

## 2023-05-16 DIAGNOSIS — E11.9 TYPE 2 DIABETES MELLITUS WITHOUT COMPLICATION, WITH LONG-TERM CURRENT USE OF INSULIN: ICD-10-CM

## 2023-05-16 DIAGNOSIS — N18.31 STAGE 3A CHRONIC KIDNEY DISEASE: ICD-10-CM

## 2023-05-16 DIAGNOSIS — I10 ESSENTIAL HYPERTENSION: Primary | Chronic | ICD-10-CM

## 2023-05-16 PROCEDURE — 99214 PR OFFICE/OUTPT VISIT, EST, LEVL IV, 30-39 MIN: ICD-10-PCS | Mod: ,,, | Performed by: PHYSICIAN ASSISTANT

## 2023-05-16 PROCEDURE — 3078F DIAST BP <80 MM HG: CPT | Mod: CPTII,,, | Performed by: PHYSICIAN ASSISTANT

## 2023-05-16 PROCEDURE — 99214 OFFICE O/P EST MOD 30 MIN: CPT | Mod: ,,, | Performed by: PHYSICIAN ASSISTANT

## 2023-05-16 PROCEDURE — 1101F PR PT FALLS ASSESS DOC 0-1 FALLS W/OUT INJ PAST YR: ICD-10-PCS | Mod: CPTII,,, | Performed by: PHYSICIAN ASSISTANT

## 2023-05-16 PROCEDURE — 99499 UNLISTED E&M SERVICE: CPT | Mod: S$GLB,,, | Performed by: PHYSICIAN ASSISTANT

## 2023-05-16 PROCEDURE — 99999 PR PBB SHADOW E&M-EST. PATIENT-LVL V: ICD-10-PCS | Mod: PBBFAC,,, | Performed by: PHYSICIAN ASSISTANT

## 2023-05-16 PROCEDURE — 3078F PR MOST RECENT DIASTOLIC BLOOD PRESSURE < 80 MM HG: ICD-10-PCS | Mod: CPTII,,, | Performed by: PHYSICIAN ASSISTANT

## 2023-05-16 PROCEDURE — 99499 RISK ADDL DX/OHS AUDIT: ICD-10-PCS | Mod: S$GLB,,, | Performed by: PHYSICIAN ASSISTANT

## 2023-05-16 PROCEDURE — 3074F PR MOST RECENT SYSTOLIC BLOOD PRESSURE < 130 MM HG: ICD-10-PCS | Mod: CPTII,,, | Performed by: PHYSICIAN ASSISTANT

## 2023-05-16 PROCEDURE — 3288F FALL RISK ASSESSMENT DOCD: CPT | Mod: CPTII,,, | Performed by: PHYSICIAN ASSISTANT

## 2023-05-16 PROCEDURE — 3288F PR FALLS RISK ASSESSMENT DOCUMENTED: ICD-10-PCS | Mod: CPTII,,, | Performed by: PHYSICIAN ASSISTANT

## 2023-05-16 PROCEDURE — 3074F SYST BP LT 130 MM HG: CPT | Mod: CPTII,,, | Performed by: PHYSICIAN ASSISTANT

## 2023-05-16 PROCEDURE — 1160F RVW MEDS BY RX/DR IN RCRD: CPT | Mod: CPTII,,, | Performed by: PHYSICIAN ASSISTANT

## 2023-05-16 PROCEDURE — 1101F PT FALLS ASSESS-DOCD LE1/YR: CPT | Mod: CPTII,,, | Performed by: PHYSICIAN ASSISTANT

## 2023-05-16 PROCEDURE — 1159F PR MEDICATION LIST DOCUMENTED IN MEDICAL RECORD: ICD-10-PCS | Mod: CPTII,,, | Performed by: PHYSICIAN ASSISTANT

## 2023-05-16 PROCEDURE — 1160F PR REVIEW ALL MEDS BY PRESCRIBER/CLIN PHARMACIST DOCUMENTED: ICD-10-PCS | Mod: CPTII,,, | Performed by: PHYSICIAN ASSISTANT

## 2023-05-16 PROCEDURE — 1159F MED LIST DOCD IN RCRD: CPT | Mod: CPTII,,, | Performed by: PHYSICIAN ASSISTANT

## 2023-05-16 PROCEDURE — 1126F PR PAIN SEVERITY QUANTIFIED, NO PAIN PRESENT: ICD-10-PCS | Mod: CPTII,,, | Performed by: PHYSICIAN ASSISTANT

## 2023-05-16 PROCEDURE — 99999 PR PBB SHADOW E&M-EST. PATIENT-LVL V: CPT | Mod: PBBFAC,,, | Performed by: PHYSICIAN ASSISTANT

## 2023-05-16 PROCEDURE — 1126F AMNT PAIN NOTED NONE PRSNT: CPT | Mod: CPTII,,, | Performed by: PHYSICIAN ASSISTANT

## 2023-05-16 NOTE — PROGRESS NOTES
"Subjective:       Patient ID: Henrietta Villasenor is a 83 y.o. female.        Chief Complaint: Follow-up    Henrietta Villasenor is an established patient of Jeannine Arriaga MD here today for follow up visit.    Missed appointment last week so rescheduled.  Did not bring any medication bottles or blood sugar logs unfortunately.  She is here alone today.    Recall she was readmitted from clinic as blood sugar dropped to 39. She had given herself unknown amount of insulin and didn't eat.  Since we have discussed this at length.      Admitted 4/18-4/20 due to HANNAH from obstructing kidney stone s/p stent.  Followed by Dr. Rudolph.    Her grandson lives with her but does not help take care of her.  Her granddaughter is in the hospital with a sickle cell crisis.  Her niece, Kacie, helps at times.  Her daughter lives in [place].       DM -   Recalls random readings, 180, 200  She holds her novolog for blood sugar <130  Blood sugar was "70 something" 5/12 upon fasting for procedure for kidney stone    Doing novolog 20/20/20 if she eats  Lantus 50 U  Doses all reduced last visit, previously on lantus 60 U and novolog 24/28/28    H/o HANNAH     Lipids -   Atorvastatin stopped at hospital d/c?  Will investigate, unsure why     CAD     HTN -  Amlodipine 10 mg  Coreg 25 mg BI     CKD 3     H/o DVT -  Eliquis 5 mg BID     Heart failure -   Lasix d/c at hospital discharge, along with K+  No swelling or shortness of breath     MARLON on CPAP           Review of Systems   Constitutional:  Negative for chills, diaphoresis, fatigue and fever.   HENT:  Negative for congestion and sore throat.    Eyes:  Negative for visual disturbance.   Respiratory:  Negative for cough, chest tightness and shortness of breath.    Cardiovascular:  Negative for chest pain, palpitations and leg swelling.   Gastrointestinal:  Negative for abdominal pain, blood in stool, constipation, diarrhea, nausea and vomiting.   Genitourinary:  Negative for dysuria, frequency, hematuria and " urgency.   Musculoskeletal:  Negative for arthralgias and back pain.   Skin:  Negative for rash.   Neurological:  Negative for dizziness, syncope, weakness and headaches.   Psychiatric/Behavioral:  Negative for dysphoric mood and sleep disturbance. The patient is not nervous/anxious.      Objective:      Physical Exam  Vitals and nursing note reviewed.   Constitutional:       Appearance: Normal appearance. She is well-developed.   HENT:      Head: Normocephalic.      Right Ear: External ear normal.      Left Ear: External ear normal.   Eyes:      Pupils: Pupils are equal, round, and reactive to light.   Cardiovascular:      Rate and Rhythm: Normal rate and regular rhythm.      Heart sounds: Normal heart sounds. No murmur heard.    No friction rub. No gallop.   Pulmonary:      Effort: Pulmonary effort is normal. No respiratory distress.      Breath sounds: Normal breath sounds.   Abdominal:      Palpations: Abdomen is soft.      Tenderness: There is no abdominal tenderness.   Skin:     General: Skin is warm and dry.   Neurological:      Mental Status: She is alert.       Assessment:       1. Essential hypertension    2. Mixed hyperlipidemia    3. Stage 3a chronic kidney disease    4. Type 2 diabetes mellitus without complication, with long-term current use of insulin        Plan:       Henrietta was seen today for follow-up.    Diagnoses and all orders for this visit:    Essential hypertension - stable and controlled, continue current regimen  BP Readings from Last 5 Encounters:   05/16/23 128/60   05/13/23 134/63   05/02/23 120/60   05/02/23 (!) 91/59   04/26/23 (!) 115/55      Mixed hyperlipidemia - lipitor d/c? will discuss with PCP at f/u upcoming    Stage 3a chronic kidney disease    Type 2 diabetes mellitus without complication, with long-term current use of insulin - she forgot blood sugar logs today, accidentally missed appointment last week, may benefit from continuous glucose monitor given h/o hypoglycemia  "requiring hospitalization and difficulty with maintaining logs, she has appointment with Marco Antonio Hunter NP next week, asked to bring logs of blood sugar readings as difficult to adjust insulin regimen without more information, currently taking lantus 50 U and novolog 20/20/20    F/u 5/24 with Marco Antonio Hunter NP and 6/8 with Dr. Arriaga    Pt has been given instructions populated from patient instructions database and has verbalized understanding of the after visit summary and information contained wherein.    Follow up with a primary care provider. May go to ER for acute shortness of breath, lightheadedness, fever, or any other emergent complaints or changes in condition.    "This note will be shared with the patient"    Future Appointments   Date Time Provider Department Center   5/24/2023  2:30 PM GEORGIANA Mcmahon, FNP [facility]   6/8/2023 11:00 AM [facility] OIC-XRAY [facility] XRAY [facility]   6/8/2023 12:00 PM Jeannine Arriaga MD [facility] PC[facility]   6/8/2023  2:45 PM [facility]-US1 MASTER [facility] ULTR [facility]   6/12/2023 10:00 AM Laz Rudolph Jr., MD [facility] UROLOGY [facility]   7/12/2023 11:00 AM Georgina Clemens, ELVIRA [facility] OPTICLB [facility]                 "

## 2023-05-23 NOTE — PROGRESS NOTES
CHIEF COMPLAINT: Type 2 Diabetes     HPI: Ms. Henrietta Villasenor is a 83 y.o. female who was diagnosed with Type 2 DM > 45 years ago.   On insulin just as long with diagnosis, possibly around age 30 y/o  Goes to [employer]   Last fall in march 2023, fallen into sewage uncovered area.  Being seen by me for the first time.  Open to klever start.   Lab Results   Component Value Date    HGBA1C 8.2 (H) 03/08/2023     Not checking BG.  Lives alone. Grandson will come by periodically/    Breakfast: oatmeal or grits, sausage   Lunch and dinner varies    PREVIOUS DIABETES MEDICATIONS TRIED  Lantus  Novolog   Metformin     CURRENT DIABETIC MEDS: lantus 50 units at night, novolog 20-20-20 units  Hold if sugar is < 120     On MDI (injections 4 x a day)   Makes frequent changes to his/her insulin regimen on the basis of blood glucose data  Testing 4 x a day  Patient is willing and able to use the device  Demonstrated an understanding of the technology and is motivated to use CGM  Patient expected to adhere to a comprehensive diabetes treatment plan and patient has adequate medical supervision  Patient experiences recurrent, unexplained, severe (lows of <50 mg/dl) hypoglycemia   Has multiple impaired awareness of hypoglycemia (hypoglycemia unawareness)  30 day log show at least 2 lows 50.    H/o 38 mg/dl during pcp office visit in the past 6-9 months    Diabetes Management Status    Statin: Taking  ACE/ARB: Not taking    Screening or Prevention Patient's value Goal Complete/Controlled?   HgA1C Testing and Control   Lab Results   Component Value Date    HGBA1C 8.2 (H) 03/08/2023      Annually/Less than 8% No   Lipid profile : 05/19/2021 Annually No   LDL control Lab Results   Component Value Date    LDLCALC 107.6 05/19/2021    Annually/Less than 100 mg/dl  No   Nephropathy screening Lab Results   Component Value Date    LABMICR 9.0 05/19/2021     Lab Results   Component Value Date    PROTEINUA Negative 04/25/2023    Annually Yes   Blood  "pressure BP Readings from Last 1 Encounters:   05/16/23 128/60    Less than 140/90 Yes   Dilated retinal exam : 05/27/2021 Annually No   Foot exam   Most Recent Foot Exam Date: Not Found Annually Yes     REVIEW OF SYSTEMS  General: no weakness, fatigue,+ weight changes (loss 12#)  Eyes: no double or blurred vision, eye pain, or redness  Cardiovascular: no chest pain, palpitations, + ankle edema, or murmurs.   Respiratory: no cough or dyspnea.   GI: no heartburn, nausea, or changes in bowel patterns; good appetite.   Skin: no rashes, dryness, itching, or reactions at insulin injection sites.  Neuro: + numbness, tingling, tremors, or vertigo.   Endocrine: no polyuria, polydipsia, polyphagia, heat or cold intolerance.     Vital Signs  /68 (BP Location: Left arm, Patient Position: Sitting, BP Method: Medium (Manual))   Pulse 71   Ht 5' 8" (1.727 m)   Wt 94.7 kg (208 lb 12.4 oz)   LMP  (LMP Unknown)   SpO2 98%   BMI 31.74 kg/m²     Hemoglobin A1C   Date Value Ref Range Status   03/08/2023 8.2 (H) 4.0 - 5.6 % Final     Comment:     ADA Screening Guidelines:  5.7-6.4%  Consistent with prediabetes  >or=6.5%  Consistent with diabetes    High levels of fetal hemoglobin interfere with the HbA1C  assay. Heterozygous hemoglobin variants (HbS, HgC, etc)do  not significantly interfere with this assay.   However, presence of multiple variants may affect accuracy.     09/09/2022 8.6 (H) 4.0 - 5.6 % Final     Comment:     ADA Screening Guidelines:  5.7-6.4%  Consistent with prediabetes  >or=6.5%  Consistent with diabetes    High levels of fetal hemoglobin interfere with the HbA1C  assay. Heterozygous hemoglobin variants (HbS, HgC, etc)do  not significantly interfere with this assay.   However, presence of multiple variants may affect accuracy.     03/17/2022 9.3 (H) 4.0 - 5.6 % Final     Comment:     ADA Screening Guidelines:  5.7-6.4%  Consistent with prediabetes  >or=6.5%  Consistent with diabetes    High levels of " fetal hemoglobin interfere with the HbA1C  assay. Heterozygous hemoglobin variants (HbS, HgC, etc)do  not significantly interfere with this assay.   However, presence of multiple variants may affect accuracy.          Chemistry        Component Value Date/Time     05/13/2023 0355    K 4.3 05/13/2023 0355     05/13/2023 0355    CO2 20 (L) 05/13/2023 0355    BUN 18 05/13/2023 0355    CREATININE 1.3 05/13/2023 0355     (H) 05/13/2023 0355        Component Value Date/Time    CALCIUM 9.3 05/13/2023 0355    ALKPHOS 89 04/26/2023 0518    AST 18 04/26/2023 0518    ALT 10 04/26/2023 0518    BILITOT 0.5 04/26/2023 0518    ESTGFRAFRICA 48.6 (A) 07/07/2022 1148    EGFRNONAA 42.2 (A) 07/07/2022 1148           Lab Results   Component Value Date    TSH 1.433 04/18/2023      Lab Results   Component Value Date    CHOL 169 05/19/2021    CHOL 175 02/20/2020    CHOL 269 (H) 11/18/2019     Lab Results   Component Value Date    HDL 45 05/19/2021    HDL 52 02/20/2020    HDL 52 11/18/2019     Lab Results   Component Value Date    LDLCALC 107.6 05/19/2021    LDLCALC 95.0 02/20/2020    LDLCALC 193.4 (H) 11/18/2019     Lab Results   Component Value Date    TRIG 82 05/19/2021    TRIG 140 02/20/2020    TRIG 118 11/18/2019     Lab Results   Component Value Date    CHOLHDL 26.6 05/19/2021    CHOLHDL 29.7 02/20/2020    CHOLHDL 19.3 (L) 11/18/2019         PHYSICAL EXAMINATION  Constitutional: Appears well, no distress Reviewed vitals above.  Eyes: conjunctivae & lids intact; PERRLA, EOMs intact; optic discs   Neck: Supple, trachea midline.   Respiratory: CTA without wheezes, even and unlabored, upon palpation wnl, percussion wnl  Cardiovascular: RRR; +1-trace ankle edema or varicosities  Lymph: deferred   Skin: warm and dry; no injection site reactions, no acanthosis nigracans observed.  Neuro:patient alert and cooperative, normal affect; steady gait.  Psychiatric: judgement & insight intact, orientation of time, place & person  intact, memory; mood & affect wnl     Diabetes Foot Exam: deferred       Assessment/Plan  1. Type 2 diabetes mellitus with stage 3a chronic kidney disease, with long-term current use of insulin  Hemoglobin A1C    Microalbumin/Creatinine Ratio, Urine    Ambulatory referral/consult to Diabetes Education      2. Type 2 diabetes mellitus without complication, with long-term current use of insulin        3. Venous insufficiency of both lower extremities        4. Type 2 diabetes mellitus with hyperglycemia, with long-term current use of insulin        5. Type 2 diabetes mellitus with diabetic polyneuropathy, with long-term current use of insulin  insulin (LANTUS SOLOSTAR U-100 INSULIN) glargine 100 units/mL SubQ pen      6. Stage 3a chronic kidney disease        7. Obesity (BMI 30.0-34.9)        8. MARLON on CPAP        9. Cluster headache, not intractable, unspecified chronicity pattern        10. Mixed hyperlipidemia  Lipid Panel      11. Hydronephrosis with urinary obstruction due to ureteral calculus        12. History of MI (myocardial infarction)        13. History of DVT (deep vein thrombosis)        14. Fatty liver        15. Essential hypertension        16. Chronic diastolic congestive heart failure        17. Coronary artery disease involving native coronary artery of native heart with angina pectoris        18. Chronic bilateral low back pain with sciatica, sciatica laterality unspecified        19. Type 2 diabetes mellitus with stage 3 chronic kidney disease, with long-term current use of insulin  insulin (LANTUS SOLOSTAR U-100 INSULIN) glargine 100 units/mL SubQ pen      20. Diabetic polyneuropathy associated with type 2 diabetes mellitus  insulin aspart U-100 (NOVOLOG) 100 unit/mL (3 mL) InPn pen        1-20. Follow up in 3-4 months w/ me   Abhay given for trulicity  Discussed cutting portions-carb book given-novonordisk  Discussed dietary habits, stressors  A1c goal less than 8%  H/o severe hypoglycemia 38  mg/dl in the past year  Susie 2 reader, sensors- [facility] medical  [place] for training needed   Cut back novolog 15-15-15 units   Hold if sugar is less than 100   Cut back lantus to 45 units at night  Add new med: glp1a trulicity 0.75 mg weekly  Goal to cut back insulin   Podiatry 2023   F/u with hepatology, vascular , cards      FOLLOW UP  In 3-4 months

## 2023-05-24 ENCOUNTER — OFFICE VISIT (OUTPATIENT)
Dept: INTERNAL MEDICINE | Facility: CLINIC | Age: 83
End: 2023-05-24
Payer: MEDICARE

## 2023-05-24 VITALS
HEIGHT: 68 IN | BODY MASS INDEX: 31.64 KG/M2 | DIASTOLIC BLOOD PRESSURE: 68 MMHG | WEIGHT: 208.75 LBS | SYSTOLIC BLOOD PRESSURE: 122 MMHG | OXYGEN SATURATION: 98 % | HEART RATE: 71 BPM

## 2023-05-24 DIAGNOSIS — G44.009 CLUSTER HEADACHE, NOT INTRACTABLE, UNSPECIFIED CHRONICITY PATTERN: ICD-10-CM

## 2023-05-24 DIAGNOSIS — E11.42 TYPE 2 DIABETES MELLITUS WITH DIABETIC POLYNEUROPATHY, WITH LONG-TERM CURRENT USE OF INSULIN: ICD-10-CM

## 2023-05-24 DIAGNOSIS — N18.30 TYPE 2 DIABETES MELLITUS WITH STAGE 3 CHRONIC KIDNEY DISEASE, WITH LONG-TERM CURRENT USE OF INSULIN: ICD-10-CM

## 2023-05-24 DIAGNOSIS — E11.42 DIABETIC POLYNEUROPATHY ASSOCIATED WITH TYPE 2 DIABETES MELLITUS: ICD-10-CM

## 2023-05-24 DIAGNOSIS — M54.40 CHRONIC BILATERAL LOW BACK PAIN WITH SCIATICA, SCIATICA LATERALITY UNSPECIFIED: ICD-10-CM

## 2023-05-24 DIAGNOSIS — I25.119 CORONARY ARTERY DISEASE INVOLVING NATIVE CORONARY ARTERY OF NATIVE HEART WITH ANGINA PECTORIS: ICD-10-CM

## 2023-05-24 DIAGNOSIS — Z79.4 TYPE 2 DIABETES MELLITUS WITH HYPERGLYCEMIA, WITH LONG-TERM CURRENT USE OF INSULIN: ICD-10-CM

## 2023-05-24 DIAGNOSIS — E11.22 TYPE 2 DIABETES MELLITUS WITH STAGE 3 CHRONIC KIDNEY DISEASE, WITH LONG-TERM CURRENT USE OF INSULIN: ICD-10-CM

## 2023-05-24 DIAGNOSIS — I25.2 HISTORY OF MI (MYOCARDIAL INFARCTION): ICD-10-CM

## 2023-05-24 DIAGNOSIS — E11.22 TYPE 2 DIABETES MELLITUS WITH STAGE 3A CHRONIC KIDNEY DISEASE, WITH LONG-TERM CURRENT USE OF INSULIN: Primary | ICD-10-CM

## 2023-05-24 DIAGNOSIS — E66.9 OBESITY (BMI 30.0-34.9): ICD-10-CM

## 2023-05-24 DIAGNOSIS — N18.31 TYPE 2 DIABETES MELLITUS WITH STAGE 3A CHRONIC KIDNEY DISEASE, WITH LONG-TERM CURRENT USE OF INSULIN: Primary | ICD-10-CM

## 2023-05-24 DIAGNOSIS — E78.2 MIXED HYPERLIPIDEMIA: ICD-10-CM

## 2023-05-24 DIAGNOSIS — Z79.4 TYPE 2 DIABETES MELLITUS WITH STAGE 3A CHRONIC KIDNEY DISEASE, WITH LONG-TERM CURRENT USE OF INSULIN: Primary | ICD-10-CM

## 2023-05-24 DIAGNOSIS — G89.29 CHRONIC BILATERAL LOW BACK PAIN WITH SCIATICA, SCIATICA LATERALITY UNSPECIFIED: ICD-10-CM

## 2023-05-24 DIAGNOSIS — Z86.718 HISTORY OF DVT (DEEP VEIN THROMBOSIS): ICD-10-CM

## 2023-05-24 DIAGNOSIS — G47.33 OSA ON CPAP: Chronic | ICD-10-CM

## 2023-05-24 DIAGNOSIS — N13.2 HYDRONEPHROSIS WITH URINARY OBSTRUCTION DUE TO URETERAL CALCULUS: ICD-10-CM

## 2023-05-24 DIAGNOSIS — E11.65 TYPE 2 DIABETES MELLITUS WITH HYPERGLYCEMIA, WITH LONG-TERM CURRENT USE OF INSULIN: ICD-10-CM

## 2023-05-24 DIAGNOSIS — Z79.4 TYPE 2 DIABETES MELLITUS WITHOUT COMPLICATION, WITH LONG-TERM CURRENT USE OF INSULIN: ICD-10-CM

## 2023-05-24 DIAGNOSIS — E11.9 TYPE 2 DIABETES MELLITUS WITHOUT COMPLICATION, WITH LONG-TERM CURRENT USE OF INSULIN: ICD-10-CM

## 2023-05-24 DIAGNOSIS — I10 ESSENTIAL HYPERTENSION: Chronic | ICD-10-CM

## 2023-05-24 DIAGNOSIS — I50.32 CHRONIC DIASTOLIC CONGESTIVE HEART FAILURE: ICD-10-CM

## 2023-05-24 DIAGNOSIS — N18.31 STAGE 3A CHRONIC KIDNEY DISEASE: ICD-10-CM

## 2023-05-24 DIAGNOSIS — I87.2 VENOUS INSUFFICIENCY OF BOTH LOWER EXTREMITIES: ICD-10-CM

## 2023-05-24 DIAGNOSIS — K76.0 FATTY LIVER: ICD-10-CM

## 2023-05-24 DIAGNOSIS — Z79.4 TYPE 2 DIABETES MELLITUS WITH DIABETIC POLYNEUROPATHY, WITH LONG-TERM CURRENT USE OF INSULIN: ICD-10-CM

## 2023-05-24 DIAGNOSIS — Z79.4 TYPE 2 DIABETES MELLITUS WITH STAGE 3 CHRONIC KIDNEY DISEASE, WITH LONG-TERM CURRENT USE OF INSULIN: ICD-10-CM

## 2023-05-24 PROCEDURE — 1101F PR PT FALLS ASSESS DOC 0-1 FALLS W/OUT INJ PAST YR: ICD-10-PCS | Mod: CPTII,S$GLB,, | Performed by: NURSE PRACTITIONER

## 2023-05-24 PROCEDURE — 1125F AMNT PAIN NOTED PAIN PRSNT: CPT | Mod: CPTII,S$GLB,, | Performed by: NURSE PRACTITIONER

## 2023-05-24 PROCEDURE — 99214 OFFICE O/P EST MOD 30 MIN: CPT | Mod: S$GLB,,, | Performed by: NURSE PRACTITIONER

## 2023-05-24 PROCEDURE — 3288F PR FALLS RISK ASSESSMENT DOCUMENTED: ICD-10-PCS | Mod: CPTII,S$GLB,, | Performed by: NURSE PRACTITIONER

## 2023-05-24 PROCEDURE — 99999 PR PBB SHADOW E&M-EST. PATIENT-LVL V: ICD-10-PCS | Mod: PBBFAC,,, | Performed by: NURSE PRACTITIONER

## 2023-05-24 PROCEDURE — 3074F PR MOST RECENT SYSTOLIC BLOOD PRESSURE < 130 MM HG: ICD-10-PCS | Mod: CPTII,S$GLB,, | Performed by: NURSE PRACTITIONER

## 2023-05-24 PROCEDURE — 3074F SYST BP LT 130 MM HG: CPT | Mod: CPTII,S$GLB,, | Performed by: NURSE PRACTITIONER

## 2023-05-24 PROCEDURE — 3078F DIAST BP <80 MM HG: CPT | Mod: CPTII,S$GLB,, | Performed by: NURSE PRACTITIONER

## 2023-05-24 PROCEDURE — 99999 PR PBB SHADOW E&M-EST. PATIENT-LVL V: CPT | Mod: PBBFAC,,, | Performed by: NURSE PRACTITIONER

## 2023-05-24 PROCEDURE — 3078F PR MOST RECENT DIASTOLIC BLOOD PRESSURE < 80 MM HG: ICD-10-PCS | Mod: CPTII,S$GLB,, | Performed by: NURSE PRACTITIONER

## 2023-05-24 PROCEDURE — 1159F PR MEDICATION LIST DOCUMENTED IN MEDICAL RECORD: ICD-10-PCS | Mod: CPTII,S$GLB,, | Performed by: NURSE PRACTITIONER

## 2023-05-24 PROCEDURE — 1125F PR PAIN SEVERITY QUANTIFIED, PAIN PRESENT: ICD-10-PCS | Mod: CPTII,S$GLB,, | Performed by: NURSE PRACTITIONER

## 2023-05-24 PROCEDURE — 99214 PR OFFICE/OUTPT VISIT, EST, LEVL IV, 30-39 MIN: ICD-10-PCS | Mod: S$GLB,,, | Performed by: NURSE PRACTITIONER

## 2023-05-24 PROCEDURE — 3288F FALL RISK ASSESSMENT DOCD: CPT | Mod: CPTII,S$GLB,, | Performed by: NURSE PRACTITIONER

## 2023-05-24 PROCEDURE — 1159F MED LIST DOCD IN RCRD: CPT | Mod: CPTII,S$GLB,, | Performed by: NURSE PRACTITIONER

## 2023-05-24 PROCEDURE — 1101F PT FALLS ASSESS-DOCD LE1/YR: CPT | Mod: CPTII,S$GLB,, | Performed by: NURSE PRACTITIONER

## 2023-05-24 RX ORDER — DULAGLUTIDE 0.75 MG/.5ML
0.75 INJECTION, SOLUTION SUBCUTANEOUS
Qty: 4 PEN | Refills: 4 | Status: SHIPPED | OUTPATIENT
Start: 2023-05-24 | End: 2023-07-05 | Stop reason: SDUPTHER

## 2023-05-24 RX ORDER — INSULIN ASPART 100 [IU]/ML
INJECTION, SOLUTION INTRAVENOUS; SUBCUTANEOUS
Qty: 45 ML | Refills: 3
Start: 2023-05-24 | End: 2024-03-06 | Stop reason: SDUPTHER

## 2023-05-24 RX ORDER — INSULIN GLARGINE 100 [IU]/ML
45 INJECTION, SOLUTION SUBCUTANEOUS DAILY
Qty: 45 ML | Refills: 3
Start: 2023-05-24 | End: 2023-07-05 | Stop reason: SDUPTHER

## 2023-05-24 NOTE — Clinical Note
Hi- starting trulicity 0.75 mg weekly, cutting down mdi doses more, klever 2 -chart note being sent Elena pt needs training

## 2023-05-24 NOTE — PATIENT INSTRUCTIONS
A1c lipid , urine mac prior -3 to 4 months  Follow up in 3 to 4 months w/Marco Antonio   Education : klever Campbell training     Lab Results   Component Value Date    HGBA1C 8.2 (H) 03/08/2023     Goal less than 7.5%    Lantus 45 units at night   Novolog 15 units before meals  Hold if sugar is less than 100     New medication: trulicity 0.75 mg weekly   Same day each week    [email or web address]  Eat fit diana  Myfitnesspal diana  [email or web address]      Goal sugar  no higher than 200

## 2023-05-30 ENCOUNTER — OFFICE VISIT (OUTPATIENT)
Dept: PODIATRY | Facility: CLINIC | Age: 83
End: 2023-05-30
Payer: MEDICARE

## 2023-05-30 ENCOUNTER — TELEPHONE (OUTPATIENT)
Dept: DIABETES | Facility: CLINIC | Age: 83
End: 2023-05-30
Payer: MEDICARE

## 2023-05-30 VITALS
HEART RATE: 64 BPM | SYSTOLIC BLOOD PRESSURE: 128 MMHG | DIASTOLIC BLOOD PRESSURE: 72 MMHG | RESPIRATION RATE: 18 BRPM | WEIGHT: 213.88 LBS | BODY MASS INDEX: 32.41 KG/M2 | HEIGHT: 68 IN

## 2023-05-30 DIAGNOSIS — Z79.4 TYPE 2 DIABETES MELLITUS WITH DIABETIC POLYNEUROPATHY, WITH LONG-TERM CURRENT USE OF INSULIN: ICD-10-CM

## 2023-05-30 DIAGNOSIS — E11.42 TYPE 2 DIABETES MELLITUS WITH DIABETIC POLYNEUROPATHY, WITH LONG-TERM CURRENT USE OF INSULIN: ICD-10-CM

## 2023-05-30 DIAGNOSIS — B35.1 ONYCHOMYCOSIS DUE TO DERMATOPHYTE: Primary | ICD-10-CM

## 2023-05-30 DIAGNOSIS — L84 CORN OR CALLUS: ICD-10-CM

## 2023-05-30 PROCEDURE — 11721 DEBRIDE NAIL 6 OR MORE: CPT | Mod: Q9,S$GLB,, | Performed by: PODIATRIST

## 2023-05-30 PROCEDURE — 11721 PR DEBRIDEMENT OF NAILS, 6 OR MORE: ICD-10-PCS | Mod: Q9,S$GLB,, | Performed by: PODIATRIST

## 2023-05-30 PROCEDURE — 99499 NO LOS: ICD-10-PCS | Mod: S$GLB,,, | Performed by: PODIATRIST

## 2023-05-30 PROCEDURE — 99999 PR PBB SHADOW E&M-EST. PATIENT-LVL IV: ICD-10-PCS | Mod: PBBFAC,,, | Performed by: PODIATRIST

## 2023-05-30 PROCEDURE — 99499 UNLISTED E&M SERVICE: CPT | Mod: S$GLB,,, | Performed by: PODIATRIST

## 2023-05-30 PROCEDURE — 99999 PR PBB SHADOW E&M-EST. PATIENT-LVL IV: CPT | Mod: PBBFAC,,, | Performed by: PODIATRIST

## 2023-05-30 RX ORDER — POTASSIUM CHLORIDE 750 MG/1
10 TABLET, EXTENDED RELEASE ORAL
COMMUNITY
Start: 2023-03-09 | End: 2023-06-08

## 2023-05-30 NOTE — PROGRESS NOTES
Subjective:     Patient ID: Henrietta Villasenor is a 83 y.o. female.    Chief Complaint: PCP (Marco Antonio Hunter, APRN, FNP 5/24/23), Diabetic Foot Exam, and Nail Care    Henrietta is a 83 y.o. female who presents to the clinic for evaluation and treatment of high risk feet. Henrietta has a past medical history of Abnormality of lung (11/08/2011), Anxiety, Arthritis, CAD (coronary artery disease), Calculus of ureter (2/22/2011), CHF (congestive heart failure), Chronic back pain (7/29/2012), Colon polyp (9/2010; 11/2010), Colon polyps (7/29/2012), Coronary artery disease involving native coronary artery of native heart with angina pectoris, Depression, Diabetes mellitus, Diabetes mellitus type II, Diverticulitis large intestine (7/27/2015), Diverticulitis of large intestine without perforation or abscess without bleeding, Diverticulosis (09/25/2010; 11/02/2010; 11/08/2011; 7/29/2012), Duodenal disorder (08/25/2011), Duodenal ulcer, unspecified as acute or chronic, without hemorrhage, perforation, or obstruction (8/24/2011), E. 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), 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), Obesity, MARLON (obstructive sleep apnea), PAD (peripheral artery disease) (2/4/2021), Pain, Peripheral neuropathy, Pneumonia, Polyneuropathy, Postherpetic neuralgia, Recurrent nephrolithiasis, S/P knee replacement (10/2/2012), S/P TKR (total knee replacement) (12/26/2012),  Sensorineural hearing loss of both ears, Thyroid disease (11/08/2011), Trouble in sleeping, Type II or unspecified type diabetes mellitus with neurological manifestations, not stated as uncontrolled(250.60), Type II or unspecified type diabetes mellitus with peripheral circulatory disorders, not stated as uncontrolled(250.70), and Type II or unspecified type diabetes mellitus with renal manifestations, not stated as uncontrolled(250.40). The patient's chief complaint is long, thick toenails. This patient has documented high risk feet requiring routine maintenance secondary to diabetes mellitis and those secondary complications of diabetes, as mentioned..    PCP: Jeannine Arriaga MD    Date Last Seen by PCP:   Chief Complaint   Patient presents with    PCP     GEORGIANA Mcmahon, FNP 5/24/23    Diabetic Foot Exam    Nail Care       Hemoglobin A1C   Date Value Ref Range Status   03/08/2023 8.2 (H) 4.0 - 5.6 % Final     Comment:     ADA Screening Guidelines:  5.7-6.4%  Consistent with prediabetes  >or=6.5%  Consistent with diabetes    High levels of fetal hemoglobin interfere with the HbA1C  assay. Heterozygous hemoglobin variants (HbS, HgC, etc)do  not significantly interfere with this assay.   However, presence of multiple variants may affect accuracy.     09/09/2022 8.6 (H) 4.0 - 5.6 % Final     Comment:     ADA Screening Guidelines:  5.7-6.4%  Consistent with prediabetes  >or=6.5%  Consistent with diabetes    High levels of fetal hemoglobin interfere with the HbA1C  assay. Heterozygous hemoglobin variants (HbS, HgC, etc)do  not significantly interfere with this assay.   However, presence of multiple variants may affect accuracy.     03/17/2022 9.3 (H) 4.0 - 5.6 % Final     Comment:     ADA Screening Guidelines:  5.7-6.4%  Consistent with prediabetes  >or=6.5%  Consistent with diabetes    High levels of fetal hemoglobin interfere with the HbA1C  assay. Heterozygous hemoglobin variants (HbS, HgC, etc)do  not  significantly interfere with this assay.   However, presence of multiple variants may affect accuracy.         Review of Systems   Constitutional: Negative for chills, decreased appetite and fever.   Cardiovascular:  Negative for leg swelling.   Skin:  Positive for dry skin and nail changes.   Musculoskeletal:  Negative for arthritis, joint pain, joint swelling and myalgias.   Gastrointestinal:  Negative for nausea and vomiting.   Neurological:  Positive for numbness. Negative for loss of balance and paresthesias.      Objective:     Physical Exam  Vitals reviewed.   Constitutional:       Appearance: She is well-developed.   Cardiovascular:      Comments: Dorsalis pedis and posterior tibial pulses are diminished Bilaterally. Toes are cool to touch. Feet are warm proximally.There is decreased digital hair. Skin is atrophic, slightly hyperpigmented, and mildly edematous      Musculoskeletal:         General: No tenderness. Normal range of motion.      Comments: Adequate joint range of motion without pain, limitation, nor crepitation Bilateral feet and ankle joints. Muscle strength is 5/5 in all groups bilaterally.         Skin:     General: Skin is warm and dry.      Findings: No ecchymosis, erythema or lesion.      Comments: Nails x10 are elongated by  2-7mm's, thickened by 3-4 mm's, dystrophic, and are darkened in  coloration . Xerosis Bilaterally. No open lesions noted.    Hyperkeratotic tissue noted to distal hallux b/l      Neurological:      Mental Status: She is alert and oriented to person, place, and time.      Comments: [place] 5.07 monofilamant testing is diminished Mynor feet. Sharp/dull sensation diminished Bilaterally. Light touch absent Bilaterally.       Psychiatric:         Behavior: Behavior normal.         Assessment:      Encounter Diagnoses   Name Primary?    Type 2 diabetes mellitus with diabetic polyneuropathy, with long-term current use of insulin     Onychomycosis due to dermatophyte  Yes    Corn or callus      Plan:     Henrietta was seen today for pcp, diabetic foot exam and nail care.    Diagnoses and all orders for this visit:    Onychomycosis due to dermatophyte    Type 2 diabetes mellitus with diabetic polyneuropathy, with long-term current use of insulin  -     Ambulatory referral/consult to Podiatry    Corn or callus      I counseled the patient on her conditions, their implications and medical management.        - Shoe inspection. Diabetic Foot Education. Patient reminded of the importance of good nutrition and blood sugar control to help prevent podiatric complications of diabetes. Patient instructed on proper foot hygeine. We discussed wearing proper shoe gear, daily foot inspections, never walking without protective shoe gear, never putting sharp instruments to feet, routine podiatric nail visits every 2-3 months.      - With patient's permission, nails were aggressively reduced and debrided x 10 to their soft tissue attachment mechanically and with electric , removing all offending nail and debris. Patient relates relief following the procedure. She will continue to monitor the areas daily, inspect her feet, wear protective shoe gear when ambulatory, moisturizer to maintain skin integrity and follow in this office in approximately 2-3 months, sooner p.r.n.    - After cleansing the  area w/ alcohol prep pad the above mentioned hyperkeratosis was trimmed utilizing No 15 scapel, to a smooth base with out incident. Patient tolerated this  well and reported comfort to the area x 2

## 2023-06-08 ENCOUNTER — HOSPITAL ENCOUNTER (OUTPATIENT)
Dept: RADIOLOGY | Facility: HOSPITAL | Age: 83
Discharge: HOME | End: 2023-06-08
Attending: STUDENT IN AN ORGANIZED HEALTH CARE EDUCATION/TRAINING PROGRAM
Payer: MEDICARE

## 2023-06-08 ENCOUNTER — OFFICE VISIT (OUTPATIENT)
Dept: INTERNAL MEDICINE | Facility: CLINIC | Age: 83
End: 2023-06-08
Payer: MEDICARE

## 2023-06-08 VITALS
OXYGEN SATURATION: 98 % | DIASTOLIC BLOOD PRESSURE: 64 MMHG | WEIGHT: 209 LBS | BODY MASS INDEX: 31.67 KG/M2 | SYSTOLIC BLOOD PRESSURE: 124 MMHG | HEART RATE: 66 BPM | HEIGHT: 68 IN

## 2023-06-08 DIAGNOSIS — Z79.4 TYPE 2 DIABETES MELLITUS WITHOUT COMPLICATION, WITH LONG-TERM CURRENT USE OF INSULIN: ICD-10-CM

## 2023-06-08 DIAGNOSIS — I50.32 CHRONIC DIASTOLIC CONGESTIVE HEART FAILURE: ICD-10-CM

## 2023-06-08 DIAGNOSIS — E11.9 TYPE 2 DIABETES MELLITUS WITHOUT COMPLICATION, WITH LONG-TERM CURRENT USE OF INSULIN: ICD-10-CM

## 2023-06-08 DIAGNOSIS — N18.31 STAGE 3A CHRONIC KIDNEY DISEASE: ICD-10-CM

## 2023-06-08 DIAGNOSIS — M10.9 GOUT, ARTHRITIS: Primary | ICD-10-CM

## 2023-06-08 DIAGNOSIS — N20.0 RIGHT NEPHROLITHIASIS: ICD-10-CM

## 2023-06-08 DIAGNOSIS — K21.9 GASTROESOPHAGEAL REFLUX DISEASE WITHOUT ESOPHAGITIS: ICD-10-CM

## 2023-06-08 DIAGNOSIS — I10 ESSENTIAL HYPERTENSION: Chronic | ICD-10-CM

## 2023-06-08 PROCEDURE — 76770 US EXAM ABDO BACK WALL COMP: CPT | Mod: 26,,, | Performed by: RADIOLOGY

## 2023-06-08 PROCEDURE — 3288F PR FALLS RISK ASSESSMENT DOCUMENTED: ICD-10-PCS | Mod: CPTII,S$GLB,, | Performed by: INTERNAL MEDICINE

## 2023-06-08 PROCEDURE — 1126F AMNT PAIN NOTED NONE PRSNT: CPT | Mod: CPTII,S$GLB,, | Performed by: INTERNAL MEDICINE

## 2023-06-08 PROCEDURE — 1101F PT FALLS ASSESS-DOCD LE1/YR: CPT | Mod: CPTII,S$GLB,, | Performed by: INTERNAL MEDICINE

## 2023-06-08 PROCEDURE — 74018 RADEX ABDOMEN 1 VIEW: CPT | Mod: 26,,, | Performed by: RADIOLOGY

## 2023-06-08 PROCEDURE — 74018 RADEX ABDOMEN 1 VIEW: CPT | Mod: TC

## 2023-06-08 PROCEDURE — 3078F PR MOST RECENT DIASTOLIC BLOOD PRESSURE < 80 MM HG: ICD-10-PCS | Mod: CPTII,S$GLB,, | Performed by: INTERNAL MEDICINE

## 2023-06-08 PROCEDURE — 76770 US KIDNEY: ICD-10-PCS | Mod: 26,,, | Performed by: RADIOLOGY

## 2023-06-08 PROCEDURE — 1101F PR PT FALLS ASSESS DOC 0-1 FALLS W/OUT INJ PAST YR: ICD-10-PCS | Mod: CPTII,S$GLB,, | Performed by: INTERNAL MEDICINE

## 2023-06-08 PROCEDURE — 1159F PR MEDICATION LIST DOCUMENTED IN MEDICAL RECORD: ICD-10-PCS | Mod: CPTII,S$GLB,, | Performed by: INTERNAL MEDICINE

## 2023-06-08 PROCEDURE — 3074F PR MOST RECENT SYSTOLIC BLOOD PRESSURE < 130 MM HG: ICD-10-PCS | Mod: CPTII,S$GLB,, | Performed by: INTERNAL MEDICINE

## 2023-06-08 PROCEDURE — 3078F DIAST BP <80 MM HG: CPT | Mod: CPTII,S$GLB,, | Performed by: INTERNAL MEDICINE

## 2023-06-08 PROCEDURE — 76770 US EXAM ABDO BACK WALL COMP: CPT | Mod: TC

## 2023-06-08 PROCEDURE — 74018 XR KUB: ICD-10-PCS | Mod: 26,,, | Performed by: RADIOLOGY

## 2023-06-08 PROCEDURE — 1159F MED LIST DOCD IN RCRD: CPT | Mod: CPTII,S$GLB,, | Performed by: INTERNAL MEDICINE

## 2023-06-08 PROCEDURE — 99214 PR OFFICE/OUTPT VISIT, EST, LEVL IV, 30-39 MIN: ICD-10-PCS | Mod: S$GLB,,, | Performed by: INTERNAL MEDICINE

## 2023-06-08 PROCEDURE — 1126F PR PAIN SEVERITY QUANTIFIED, NO PAIN PRESENT: ICD-10-PCS | Mod: CPTII,S$GLB,, | Performed by: INTERNAL MEDICINE

## 2023-06-08 PROCEDURE — 99999 PR PBB SHADOW E&M-EST. PATIENT-LVL IV: ICD-10-PCS | Mod: PBBFAC,,, | Performed by: INTERNAL MEDICINE

## 2023-06-08 PROCEDURE — 99999 PR PBB SHADOW E&M-EST. PATIENT-LVL IV: CPT | Mod: PBBFAC,,, | Performed by: INTERNAL MEDICINE

## 2023-06-08 PROCEDURE — 3074F SYST BP LT 130 MM HG: CPT | Mod: CPTII,S$GLB,, | Performed by: INTERNAL MEDICINE

## 2023-06-08 PROCEDURE — 3288F FALL RISK ASSESSMENT DOCD: CPT | Mod: CPTII,S$GLB,, | Performed by: INTERNAL MEDICINE

## 2023-06-08 PROCEDURE — 99214 OFFICE O/P EST MOD 30 MIN: CPT | Mod: S$GLB,,, | Performed by: INTERNAL MEDICINE

## 2023-06-08 PROCEDURE — 99214 OFFICE O/P EST MOD 30 MIN: CPT | Mod: PBBFAC,25 | Performed by: INTERNAL MEDICINE

## 2023-06-08 RX ORDER — OXYCODONE AND ACETAMINOPHEN 10; 325 MG/1; MG/1
1 TABLET ORAL
Qty: 150 TABLET | Refills: 0 | Status: SHIPPED | OUTPATIENT
Start: 2023-06-08 | End: 2023-07-05 | Stop reason: SDUPTHER

## 2023-06-08 NOTE — Clinical Note
Saw pt today. Sugars are doing better.  She does not have the Free Style Susie or Trulicity yet.  Are we waiting for insurance approval?  SF

## 2023-06-08 NOTE — PROGRESS NOTES
CHIEF COMPLAINT:   Follow up of multple problems.      HISTORY OF PRESENT ILLNESS: This is a 83-year-old woman who presents for follow up of above    She is checking her blood sugars 3 times daily with test strips.  She is eating 2-3 times daily. She is eating better.  She has not gotten Free Style Susie yet. Ela has not started on Trulicity 0.75 mg once a week yet.     She is taking Lantus 40 units at night and Novolog 14 units when eating.  Blood sugars range 89 to 169.      No cough, fever, chills, sinus congestion, sore throat. She contineus to have some shortness of breath.      She has her pill bottles with her today. She does not have atorvastatin, percocet or her insulins. She states she is out of the percocet.      She has had itching on both arms and some of her legs.  No rash. Itching comes and goes. She takes benadryl at night which helps her sleep.      She continues to have neck and shoulder pain, back pain. She complains of back pain and body pain  which has been severe. She is taking oxycodone apap 10/325 up to 5 times daily.   She continues to take duloxetine 60 mg twice daily.  Her mood is doing ok.        She saw Dr Reilly on 1/18/22 - she continues to take Eliquis 5 mg wice daily.  She had cardiac PET stress 2/9/2022 which was negative for ischemia          No fever or chills. She has felt cold.  No nausea, vomiting, constipation, dysuria, hematuira.  She denies swallowing problems today.        She is living alone. She does not see her grandson very  much. She has a niece, Kacie Mast who lives locally.      She is taking  amlodipine 10 mg 1 daily, coreg 25 mg twice daily for her hypertension and diastolic dysfunction.     Ela is taking requipo 0.5  mg at bedtime - for helping the restless leg    She had a right ureteroscopy with laser lithotripsy and stone basketing on 5/12/23.  Her pain from the stone has resolved.  She still has a urinary stent.         PAST MEDICAL HISTORY:   1. Diabetes  "mellitus   2. Hypertension   3. Hyperlipidemia   4. Left heart catheterization January 2007 which revealed luminal irregularities in the LAD, left circumflex and right coronary artery. She had diastolic dysfunction and patent renal arteries.   5. Sleep apnea   6. Obesity.   7. Nephrolithiasis status post lithotripsy.   8. Reflux - had nonerosive gastropathy on EGD September 2007. Gastritis on EGD 9/2010  9. Hidradenitis suppurativa.   10. History of diverticulitis with a hospitalized October 2007.   11. Migraines   12. Obesity.   13. 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    14. Total hysterectomy in 1969.   15. Cholecystectomy was done at that time as well.   16. 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   17. Colon polyps on colonscopy 11/2010 - due 2013   18. Hospitalization 12/10 for e coli sepsis due to left ureteral stone with left nephrostomy tube in [place], [place]   19. Left knee replacement 9/2012      MEDICATIONS and ALLERGIES: Updated on EPIC.     PHYSICAL EXAMINATION:        /70 (BP Location: Left arm, Patient Position: Sitting)   Pulse 74   Ht 5' 8" (1.727 m)   Wt 100.5 kg (221 lb 10.8 oz)   LMP  (LMP Unknown)   SpO2 99%   BMI 33.71 kg/m²       GENERAL: She is alert, oriented, no apparent distress. Affect within   normal limits.  Conjunctiva anicteric.    NECK: Supple.   Respiratory: Effort normal. Lungs clear  HEART: Regular rate and rhythm without murmurs, gallops or rubs.   No lower extremity edema.  .            ASSESSMENT AND PLAN:      1. Diabetes - doing better - follow up with Marco Antonio matamoros. WIll follow up about Free Style Susie  2. Kidney stones - follow up with urology  3. Hypertension with diastolic heart failure- stable. Negative PET stress.  stable on amlodipine to 10 mg  one tablet daily and carvedilol 25 mg one tablet twice daily.   4.  OA neck and lumbar spine - on oxycodone. stable  5.  Gout - asx " off allopurinol  6.  Mood disorder -  on duloxetine 60 mg twice daily  7.  Swallowing disorder - s/p EGD with dilation on 11/11/21.   8. Asthma - stable   9. Gerd -on pantoprazole 40 mg  daily   10. Hyperlipidemia - off  lipitor  11. Allergic rhinitis - stable  12. CRI -stable  13. Obesity - discussed diet, exercise and weight loss  14. CAD -risk factor modification  15. Aortic atherosclerosis and possible mesenteric artery stenosis- risk factor modification  16. Tortuous aorta - HTN controlled  17. Colon polyps - Colonoscopy in 8/2013 - 2 polyps. Flex sig 11/2013 - mild granular mucosa. Colonoscopy 6/2017 diverticulosis - no polyps due 2023.   18. hyperparathryodisism -saw nephrology   19. .Restless leg syndrome - on roprinole      .  MMG 7/22        I will see her back in 1 month sooner if problems arise

## 2023-06-09 ENCOUNTER — TELEPHONE (OUTPATIENT)
Dept: INTERNAL MEDICINE | Facility: CLINIC | Age: 83
End: 2023-06-09
Payer: MEDICARE

## 2023-06-09 NOTE — PROGRESS NOTES
Spoke to Claude pierce/[facility]- pt rec'd Precocet and Trulicity on yesterday.  Order is needed for Freestyle Susie 2-  pharmacy is not able to order 3 at this time.

## 2023-06-09 NOTE — TELEPHONE ENCOUNTER
In response to message below, [facility] was called - Percocet and Trulicity were dispensed.  They are unable to order Freestyle Susie 3.  They can order the 1 and 2 series.    GEORGIANA Mcmahon, FNP  Jeannine Arriaga MD; P Nurse [place]  Hi Dr. Arriaga! Trulicity was sent to [facility] -so the pa should have been done there.   The susie goes via [facility] - if she does not call to verify address etc. -she may have missed out.   We can resend to [facility].   Mrs. Bonner-please call to see if trulicity rx is still there- pt will need training/nurse visit.

## 2023-06-12 ENCOUNTER — TELEPHONE (OUTPATIENT)
Dept: UROLOGY | Facility: CLINIC | Age: 83
End: 2023-06-12

## 2023-06-12 ENCOUNTER — TELEPHONE (OUTPATIENT)
Dept: INTERNAL MEDICINE | Facility: CLINIC | Age: 83
End: 2023-06-12
Payer: MEDICARE

## 2023-06-12 ENCOUNTER — OFFICE VISIT (OUTPATIENT)
Dept: UROLOGY | Facility: CLINIC | Age: 83
End: 2023-06-12
Payer: MEDICARE

## 2023-06-12 VITALS
BODY MASS INDEX: 31.73 KG/M2 | HEART RATE: 73 BPM | WEIGHT: 209.38 LBS | SYSTOLIC BLOOD PRESSURE: 167 MMHG | DIASTOLIC BLOOD PRESSURE: 79 MMHG | HEIGHT: 68 IN

## 2023-06-12 DIAGNOSIS — N20.0 RENAL CALCULI: Primary | ICD-10-CM

## 2023-06-12 DIAGNOSIS — Z98.890 POST-OPERATIVE STATE: ICD-10-CM

## 2023-06-12 PROCEDURE — 1160F PR REVIEW ALL MEDS BY PRESCRIBER/CLIN PHARMACIST DOCUMENTED: ICD-10-PCS | Mod: CPTII,S$GLB,, | Performed by: UROLOGY

## 2023-06-12 PROCEDURE — 1101F PR PT FALLS ASSESS DOC 0-1 FALLS W/OUT INJ PAST YR: ICD-10-PCS | Mod: CPTII,S$GLB,, | Performed by: UROLOGY

## 2023-06-12 PROCEDURE — 87186 SC STD MICRODIL/AGAR DIL: CPT | Performed by: UROLOGY

## 2023-06-12 PROCEDURE — 1160F RVW MEDS BY RX/DR IN RCRD: CPT | Mod: CPTII,S$GLB,, | Performed by: UROLOGY

## 2023-06-12 PROCEDURE — 3077F SYST BP >= 140 MM HG: CPT | Mod: CPTII,S$GLB,, | Performed by: UROLOGY

## 2023-06-12 PROCEDURE — 3288F PR FALLS RISK ASSESSMENT DOCUMENTED: ICD-10-PCS | Mod: CPTII,S$GLB,, | Performed by: UROLOGY

## 2023-06-12 PROCEDURE — 99024 PR POST-OP FOLLOW-UP VISIT: ICD-10-PCS | Mod: S$GLB,,, | Performed by: UROLOGY

## 2023-06-12 PROCEDURE — 3288F FALL RISK ASSESSMENT DOCD: CPT | Mod: CPTII,S$GLB,, | Performed by: UROLOGY

## 2023-06-12 PROCEDURE — 87077 CULTURE AEROBIC IDENTIFY: CPT | Performed by: UROLOGY

## 2023-06-12 PROCEDURE — 87086 URINE CULTURE/COLONY COUNT: CPT | Performed by: UROLOGY

## 2023-06-12 PROCEDURE — 99024 POSTOP FOLLOW-UP VISIT: CPT | Mod: S$GLB,,, | Performed by: UROLOGY

## 2023-06-12 PROCEDURE — 1159F PR MEDICATION LIST DOCUMENTED IN MEDICAL RECORD: ICD-10-PCS | Mod: CPTII,S$GLB,, | Performed by: UROLOGY

## 2023-06-12 PROCEDURE — 1125F PR PAIN SEVERITY QUANTIFIED, PAIN PRESENT: ICD-10-PCS | Mod: CPTII,S$GLB,, | Performed by: UROLOGY

## 2023-06-12 PROCEDURE — 3078F DIAST BP <80 MM HG: CPT | Mod: CPTII,S$GLB,, | Performed by: UROLOGY

## 2023-06-12 PROCEDURE — 3078F PR MOST RECENT DIASTOLIC BLOOD PRESSURE < 80 MM HG: ICD-10-PCS | Mod: CPTII,S$GLB,, | Performed by: UROLOGY

## 2023-06-12 PROCEDURE — 1159F MED LIST DOCD IN RCRD: CPT | Mod: CPTII,S$GLB,, | Performed by: UROLOGY

## 2023-06-12 PROCEDURE — 1125F AMNT PAIN NOTED PAIN PRSNT: CPT | Mod: CPTII,S$GLB,, | Performed by: UROLOGY

## 2023-06-12 PROCEDURE — 87088 URINE BACTERIA CULTURE: CPT | Performed by: UROLOGY

## 2023-06-12 PROCEDURE — 99999 PR PBB SHADOW E&M-EST. PATIENT-LVL IV: ICD-10-PCS | Mod: PBBFAC,,, | Performed by: UROLOGY

## 2023-06-12 PROCEDURE — 3077F PR MOST RECENT SYSTOLIC BLOOD PRESSURE >= 140 MM HG: ICD-10-PCS | Mod: CPTII,S$GLB,, | Performed by: UROLOGY

## 2023-06-12 PROCEDURE — 99999 PR PBB SHADOW E&M-EST. PATIENT-LVL IV: CPT | Mod: PBBFAC,,, | Performed by: UROLOGY

## 2023-06-12 PROCEDURE — 1101F PT FALLS ASSESS-DOCD LE1/YR: CPT | Mod: CPTII,S$GLB,, | Performed by: UROLOGY

## 2023-06-12 NOTE — PROGRESS NOTES
Subjective:       Patient ID: Henrietta Villasenor is a 83 y.o. female.    Chief Complaint: ureteral calculus    HPI  patient is status post right ureteroscopic kidney stone removal.  Follow-up KUB demonstrates 2 stones in the right kidney with more on ultrasound.  She has a small stone on the left side.  Patient is status post right ureteroscopic stone extraction.  She still has some retained fragments and would like to have repeat ureteroscopy second-look procedure    Past Medical History:   Diagnosis Date    Abnormality of lung 11/08/2011    Stable bandlike opacities at the lung bases, most likely representing      Anxiety     Arthritis     CAD (coronary artery disease)     Calculus of ureter 2/22/2011    CHF (congestive heart failure)     Chronic back pain 7/29/2012    Colon polyp 9/2010; 11/2010    Colon polyps 7/29/2012    Coronary artery disease involving native coronary artery of native heart with angina pectoris     Depression     Diabetes mellitus     Diabetes mellitus type II     Diverticulitis large intestine 7/27/2015    Diverticulitis of large intestine without perforation or abscess without bleeding     Diverticulosis 09/25/2010; 11/02/2010; 11/08/2011; 7/29/2012    Duodenal disorder 08/25/2011    Duodenal erosion noted on EGD.    Duodenal ulcer, unspecified as acute or chronic, without hemorrhage, perforation, or obstruction 8/24/2011    E. coli sepsis 12/2010    Due to left ureteral stone with left nephrostomy tube - hospitalized in [place]    Esophageal dysmotility 01/24/2012    Noted on upper GI-barium swallow.    Facial weakness 1969    Left facial weakness s/p left mastoidectomy in ~ 1969.    Fatty liver 11/08/2011    Reported on CT-abdomen and in 06/2012 Gastro clinic visit note.    Gastric polyp 09/29/2010    GERD (gastroesophageal reflux disease) 7/29/2012    Hepatomegaly 11/08/2011    Reported on CT-abdomen    Herpes zoster with other nervous system complications(053.19) 2/28/2011    Hiatal hernia  06/26/2006; 09/29/2010; 08/25/2011    Noted on barium swallow 2006; noted on  EGD 2011.    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    Trouble in sleeping     Type II or unspecified type diabetes mellitus with neurological manifestations, not stated as uncontrolled(250.60)     Type II or unspecified type diabetes mellitus with peripheral circulatory disorders, not stated as uncontrolled(250.70)     Type II or unspecified type diabetes mellitus with renal manifestations, not stated as uncontrolled(250.40)        Past Surgical History:   Procedure Laterality Date    BELPHAROPTOSIS REPAIR      s/p LAMBERTO levator repair - Dr. Dejesus    CARDIAC CATHETERIZATION      CARPAL TUNNEL RELEASE  3/13/2012    CATARACT EXTRACTION W/  INTRAOCULAR LENS IMPLANT Right 10/31/2018        CATARACT EXTRACTION W/  INTRAOCULAR LENS IMPLANT Left 03/22/2018        CHOLECYSTECTOMY      COLONOSCOPY N/A 11/16/2016    Procedure: COLONOSCOPY;  Surgeon: Chris Storey MD;  Location: [address]);  Service: Endoscopy;  Laterality: N/A;    COLONOSCOPY N/A 6/14/2017    Procedure: COLONOSCOPY;  Surgeon: Chris Storey MD;  Location: [facility] ([address] [place] [address]);  Service: Endoscopy;  Laterality: N/A;   colonoscopy in 3 months with better bowel prep. - Split PEG prep ordered    CYSTOSCOPY  4/19/2023    Procedure: CYSTOSCOPY;  Surgeon: Domo Bates MD;  [facility];  Service: Urology;;    CYSTOSCOPY N/A 5/12/2023    Procedure: CYSTOSCOPY;  Surgeon: Laz Rudolph Jr., MD;  [facility];  Service: Urology;  Laterality: N/A;    ESOPHAGOGASTRODUODENOSCOPY N/A 11/10/2021    Procedure: EGD (ESOPHAGOGASTRODUODENOSCOPY);  Surgeon: Kuldeep Velázquez MD;  Location: [facility] ENDO (4TH FLR);  Service: Endoscopy;  Laterality: N/A;  11/4-eliquis hold ok see te -tb-fully vacc-inst mail and verbal-    EXTRACORPOREAL SHOCK WAVE LITHOTRIPSY      EXTRACTION - STONE Right 5/12/2023    Procedure: EXTRACTION - STONE;  Surgeon: Laz Rudolph Jr., MD;  [facility];  Service: Urology;  Laterality: Right;    INJECTION OF ANESTHETIC AGENT AROUND NERVE Bilateral 6/24/2020    Procedure: BLOCK, NERVE, C4-C5-C6 MEDIAL BRANCH ok per [place] to add on;  Surgeon: Oscar Ravi MD;  Location: [facility] [place] [facility]IN MGT;  Service: Pain Management;  Laterality: Bilateral;    INTRAOCULAR PROSTHESES INSERTION Right 10/31/2018    Procedure: INSERTION-INTRAOCULAR LENS (IOL);  Surgeon: Keysha Valle MD;  Location: [facility] [place] OR;  Service: Ophthalmology;  Laterality: Right;    JOINT REPLACEMENT      LASER LITHOTRIPSY Right 5/12/2023    Procedure: LITHOTRIPSY, USING LASER;  Surgeon: Laz Rudolph Jr., MD;  Location: [address];  Service: Urology;  Laterality: Right;    mastoid tumor removal  1969    Left mastoidectomy with residual left facial weakness.    NEPHROSTOMY      OOPHORECTOMY      PHACOEMULSIFICATION OF CATARACT Right 10/31/2018    Procedure: PHACOEMULSIFICATION-ASPIRATION-CATARACT;  Surgeon: Keysha Valle MD;  Location: [facility] [place] OR;  Service: Ophthalmology;  Laterality: Right;    PYELOSCOPY Right 5/12/2023    Procedure: PYELOSCOPY;  Surgeon: Laz Rudolph Jr., MD;  Location: [facility] OR 1ST FLR;  Service:  Urology;  Laterality: Right;    REMOVAL, STENT, URETER Right 5/12/2023    Procedure: REMOVAL, STENT, URETER;  Surgeon: Laz Rudolph Jr., MD;  Location: [facility] OR 1ST FLR;  Service: Urology;  Laterality: Right;    RETROGRADE PYELOGRAPHY Right 4/19/2023    Procedure: PYELOGRAM, RETROGRADE;  Surgeon: Domo Bates MD;  Location: [facility] OR 1ST FLR;  Service: Urology;  Laterality: Right;    TOTAL ABDOMINAL HYSTERECTOMY  1969    TAHBSO    TOTAL KNEE ARTHROPLASTY  9/13/2012    Left    TRANSFORAMINAL EPIDURAL INJECTION OF STEROID Bilateral 9/17/2019    Procedure: INJECTION, STEROID, EPIDURAL, TRANSFORAMINAL APPROACH;  Surgeon: Oscar Ravi MD;  Location: [facility]IN [facility]GT;  Service: Pain Management;  Laterality: Bilateral;  B/L TFESI L4/L5    TRANSFORAMINAL EPIDURAL INJECTION OF STEROID Bilateral 10/2/2020    Procedure: INJECTION, STEROID, EPIDURAL, TRANSFORAMINAL APPROACH;  Surgeon: Oscar Ravi MD;  Location: [facility] PAIN [facility]GT;  Service: Pain Management;  Laterality: Bilateral;  B/L TFESI L4/5    TRANSFORAMINAL EPIDURAL INJECTION OF STEROID Bilateral 5/7/2021    Procedure: INJECTION, STEROID, EPIDURAL, TRANSFORAMINAL APPROACH, L4-L5 clear to hold Eliquis 3 days;  Surgeon: Oscar Ravi MD;  Location: [facility];  Service: Pain Management;  Laterality: Bilateral;    URETERAL STENT PLACEMENT Right 4/19/2023    Procedure: INSERTION, STENT, URETER;  Surgeon: Domo Bates MD;  Location: [facility] OR 1ST FLR;  Service: Urology;  Laterality: Right;    URETERAL STENT PLACEMENT N/A 5/12/2023    Procedure: INSERTION, STENT, URETER;  Surgeon: Laz Rudolph Jr., MD;  Location: [facility] OR 1ST FLR;  Service: Urology;  Laterality: N/A;    URETEROSCOPY Right 5/12/2023    Procedure: URETEROSCOPY;  Surgeon: Laz Rudolph Jr., MD;  Location: [facility] OR 1ST FLR;  Service: Urology;  Laterality: Right;       Family History   Problem Relation Age of Onset    Sleep apnea Sister     Diabetes Sister     Cancer Mother         brain tumor     Hypertension Mother     Heart disease Father     Heart attack Father     Dementia Brother     Lupus Brother     Frontotemporal dementia Brother     No Known Problems Daughter     No Known Problems Son     Diabetes Sister     Lupus Sister     Breast cancer Sister     Diabetes Sister     Cancer Sister         breast cancer    Heart attack Sister     Diabetes Sister     Liver cancer Brother     Drug abuse Brother     Alcohol abuse Brother     Cirrhosis Brother     Drug abuse Brother     No Known Problems Daughter     No Known Problems Son     No Known Problems Son     No Known Problems Maternal Grandmother     No Known Problems Maternal Grandfather     No Known Problems Paternal Grandmother     No Known Problems Paternal Grandfather     Diabetes Brother     Dementia Brother     Diabetes Other     Ovarian cancer Other         Niece     Breast cancer Maternal Aunt     No Known Problems Maternal Uncle     No Known Problems Paternal Aunt     No Known Problems Paternal Uncle     Glaucoma Neg Hx     Blindness Neg Hx     Celiac disease Neg Hx     Colon cancer Neg Hx     Colon polyps Neg Hx     Esophageal cancer Neg Hx     Inflammatory bowel disease Neg Hx     Liver disease Neg Hx     Rectal cancer Neg Hx     Stomach cancer Neg Hx     Ulcerative colitis Neg Hx     Melanoma Neg Hx     Multiple sclerosis Neg Hx     Psoriasis Neg Hx     Amblyopia Neg Hx     Cataracts Neg Hx     Macular degeneration Neg Hx     Retinal detachment Neg Hx     Strabismus Neg Hx     Stroke Neg Hx     Thyroid disease Neg Hx        Social History     Socioeconomic History    Marital status: Single    Number of children: 5   Tobacco Use    Smoking status: Former     Packs/day: 1.00     Years: 15.00     Pack years: 15.00     Types: Cigarettes     Quit date: 2000     Years since quittin.8    Smokeless tobacco: Never   Substance and Sexual Activity    Alcohol use: No    Drug use: No    Sexual activity: Never   Social History Narrative    Single  with 5 children. Lives alone. Retired cook.     Social Determinants of Health     Financial Resource Strain: Medium Risk    Difficulty of Paying Living Expenses: Somewhat hard   Food Insecurity: No Food Insecurity    Worried About Running Out of Food in the Last Year: Never true    Ran Out of Food in the Last Year: Never true   Transportation Needs: No Transportation Needs    Lack of Transportation (Medical): No    Lack of Transportation (Non-Medical): No   Physical Activity: Insufficiently Active    Days of Exercise per Week: 5 days    Minutes of Exercise per Session: 10 min   Stress: No Stress Concern Present    Feeling of Stress : Only a little   Social Connections: Unknown    Frequency of Communication with Friends and Family: Never    Frequency of Social Gatherings with Friends and Family: Never    Attends Zoroastrian Services: Never    Active Member of Clubs or Organizations: No    Attends Club or Organization Meetings: Never   Housing Stability: Low Risk     Unable to Pay for Housing in the Last Year: No    Number of Places Lived in the Last Year: 1    Unstable Housing in the Last Year: No       Allergies:  Crestor [rosuvastatin], Ezetimibe, Hydrocodone, Lisinopril, Sulfa (sulfonamide antibiotics), Sulfamethoxazole-trimethoprim, and Valsartan    Medications:    Current Outpatient Medications:     ACCU-CHEK GUIDE TEST STRIPS Strp, TEST BLOOD SUGAR THREE TIMES DAILY, Disp: 300 strip, Rfl: 3    ACCU-CHEK SOFT DEV LANCETS Kit, , Disp: , Rfl:     albuterol (PROVENTIL/VENTOLIN HFA) 90 mcg/actuation inhaler, Inhale 2 puffs by mouth into the lungs every 4 (four) hours as needed for Wheezing. Rescue, Disp: 25.5 g, Rfl: 3    amLODIPine (NORVASC) 10 MG tablet, Take 1 tablet (10 mg total) by mouth once daily., Disp: 30 tablet, Rfl: 0    apixaban (ELIQUIS) 5 mg Tab, Take 1 tablet (5 mg total) by mouth 2 (two) times daily., Disp: 60 tablet, Rfl: 0    atorvastatin (LIPITOR) 40 MG tablet, Take 40 mg by mouth once daily., Disp:  ", Rfl:     BD ULTRA-FINE OLU PEN NEEDLE 32 gauge x 5/32" Ndle, Uses 4 times daily, on multiple daily insulin injections, Disp: 130 each, Rfl: 12    carvediloL (COREG) 25 MG tablet, Take 1 tablet (25 mg total) by mouth 2 (two) times daily., Disp: 60 tablet, Rfl: 0    cholecalciferol, vitamin D3, 125 mcg (5,000 unit) capsule, Take 5,000 Units by mouth once daily., Disp: , Rfl:     DROPLET PEN NEEDLE 29 gauge x 1/2" Ndle, USE FOUR TIMES DAILY, Disp: 400 each, Rfl: 3    DROPSAFE ALCOHOL PREP PADS PadM, USE TOPICALLY TO TEST BLOOD SUGAR FOUR TIMES DAILY, Disp: 400 each, Rfl: 3    dulaglutide (TRULICITY) 0.75 mg/0.5 mL pen injector, Inject 0.75 mg into the skin every 7 days., Disp: 4 pen, Rfl: 4    DULoxetine (CYMBALTA) 60 MG capsule, Take 1 capsule (60 mg total) by mouth 2 (two) times daily. Please hold until you follow up with your primary care provider, Disp: 180 capsule, Rfl: 1    fluticasone propionate (FLONASE) 50 mcg/actuation nasal spray, Instill 2 sprays in each nostril once daily., Disp: 16 g, Rfl: 1    insulin (LANTUS SOLOSTAR U-100 INSULIN) glargine 100 units/mL SubQ pen, Inject 45 Units into the skin once daily., Disp: 45 mL, Rfl: 3    insulin aspart U-100 (NOVOLOG) 100 unit/mL (3 mL) InPn pen, Inject 15 units before meals, hold if sugar is less than 100. Max daily 45 units., Disp: 45 mL, Rfl: 3    lancets (ACCU-CHEK SOFTCLIX LANCETS) Misc, TEST BLOOD SUGAR FOUR TIMES DAILY, Disp: 400 each, Rfl: 3    olopatadine (PATADAY) 0.2 % Drop, Place 1 drop into both eyes once daily., Disp: 2.5 mL, Rfl: 0    oxyCODONE-acetaminophen (PERCOCET)  mg per tablet, Take 1 tablet by mouth 5 (five) times daily., Disp: 150 tablet, Rfl: 0    pantoprazole (PROTONIX) 40 MG tablet, Take 1 tablet (40 mg total) by mouth once daily., Disp: 90 tablet, Rfl: 1    rOPINIRole (REQUIP) 0.5 MG tablet, Take 1 tablet (0.5 mg total) by mouth every evening., Disp: 30 tablet, Rfl: 0    tamsulosin (FLOMAX) 0.4 mg Cap, Take 1 capsule (0.4 mg " total) by mouth every evening., Disp: 30 capsule, Rfl: 0    blood-glucose meter kit, Use as instructed, Disp: 1 each, Rfl: 0    Review of Systems    Objective:      Physical Exam    Assessment:       1. Renal calculi    2. Post-operative state        Plan:       Henrietta was seen today for ureteral calculus.    Diagnoses and all orders for this visit:    Renal calculi    Post-operative state

## 2023-06-12 NOTE — TELEPHONE ENCOUNTER
----- Message from Gladis Isidro MA sent at 6/12/2023  1:08 PM CDT -----  Good Morning,  the patient is having a 2nd surgery with Dr Rudolph and the Dr would like clearance for the patient to hold her Eliqus 2 days prior to surgery.        Gladis Isidro  Urology Surgery Scheduler  [phone]

## 2023-06-12 NOTE — H&P (VIEW-ONLY)
Subjective:       Patient ID: Henrietta Villasenor is a 83 y.o. female.    Chief Complaint: ureteral calculus    HPI  patient is status post right ureteroscopic kidney stone removal.  Follow-up KUB demonstrates 2 stones in the right kidney with more on ultrasound.  She has a small stone on the left side.  Patient is status post right ureteroscopic stone extraction.  She still has some retained fragments and would like to have repeat ureteroscopy second-look procedure    Past Medical History:   Diagnosis Date    Abnormality of lung 11/08/2011    Stable bandlike opacities at the lung bases, most likely representing      Anxiety     Arthritis     CAD (coronary artery disease)     Calculus of ureter 2/22/2011    CHF (congestive heart failure)     Chronic back pain 7/29/2012    Colon polyp 9/2010; 11/2010    Colon polyps 7/29/2012    Coronary artery disease involving native coronary artery of native heart with angina pectoris     Depression     Diabetes mellitus     Diabetes mellitus type II     Diverticulitis large intestine 7/27/2015    Diverticulitis of large intestine without perforation or abscess without bleeding     Diverticulosis 09/25/2010; 11/02/2010; 11/08/2011; 7/29/2012    Duodenal disorder 08/25/2011    Duodenal erosion noted on EGD.    Duodenal ulcer, unspecified as acute or chronic, without hemorrhage, perforation, or obstruction 8/24/2011    E. coli sepsis 12/2010    Due to left ureteral stone with left nephrostomy tube - hospitalized in [place]    Esophageal dysmotility 01/24/2012    Noted on upper GI-barium swallow.    Facial weakness 1969    Left facial weakness s/p left mastoidectomy in ~ 1969.    Fatty liver 11/08/2011    Reported on CT-abdomen and in 06/2012 Gastro clinic visit note.    Gastric polyp 09/29/2010    GERD (gastroesophageal reflux disease) 7/29/2012    Hepatomegaly 11/08/2011    Reported on CT-abdomen    Herpes zoster with other nervous system complications(053.19) 2/28/2011    Hiatal hernia  06/26/2006; 09/29/2010; 08/25/2011    Noted on barium swallow 2006; noted on  EGD 2011.    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    Trouble in sleeping     Type II or unspecified type diabetes mellitus with neurological manifestations, not stated as uncontrolled(250.60)     Type II or unspecified type diabetes mellitus with peripheral circulatory disorders, not stated as uncontrolled(250.70)     Type II or unspecified type diabetes mellitus with renal manifestations, not stated as uncontrolled(250.40)        Past Surgical History:   Procedure Laterality Date    BELPHAROPTOSIS REPAIR      s/p LAMBERTO levator repair - Dr. Dejesus    CARDIAC CATHETERIZATION      CARPAL TUNNEL RELEASE  3/13/2012    CATARACT EXTRACTION W/  INTRAOCULAR LENS IMPLANT Right 10/31/2018        CATARACT EXTRACTION W/  INTRAOCULAR LENS IMPLANT Left 03/22/2018        CHOLECYSTECTOMY      COLONOSCOPY N/A 11/16/2016    Procedure: COLONOSCOPY;  Surgeon: Chris Storey MD;  Location: [address]);  Service: Endoscopy;  Laterality: N/A;    COLONOSCOPY N/A 6/14/2017    Procedure: COLONOSCOPY;  Surgeon: Chris Storey MD;  Location: [facility] ([address] [place] [address]);  Service: Endoscopy;  Laterality: N/A;   colonoscopy in 3 months with better bowel prep. - Split PEG prep ordered    CYSTOSCOPY  4/19/2023    Procedure: CYSTOSCOPY;  Surgeon: Domo Bates MD;  [facility];  Service: Urology;;    CYSTOSCOPY N/A 5/12/2023    Procedure: CYSTOSCOPY;  Surgeon: Laz Rudolph Jr., MD;  [facility];  Service: Urology;  Laterality: N/A;    ESOPHAGOGASTRODUODENOSCOPY N/A 11/10/2021    Procedure: EGD (ESOPHAGOGASTRODUODENOSCOPY);  Surgeon: Kuldeep Velázquez MD;  Location: [facility] ENDO (4TH FLR);  Service: Endoscopy;  Laterality: N/A;  11/4-eliquis hold ok see te -tb-fully vacc-inst mail and verbal-    EXTRACORPOREAL SHOCK WAVE LITHOTRIPSY      EXTRACTION - STONE Right 5/12/2023    Procedure: EXTRACTION - STONE;  Surgeon: Laz Rudolph Jr., MD;  [facility];  Service: Urology;  Laterality: Right;    INJECTION OF ANESTHETIC AGENT AROUND NERVE Bilateral 6/24/2020    Procedure: BLOCK, NERVE, C4-C5-C6 MEDIAL BRANCH ok per [place] to add on;  Surgeon: Oscar Ravi MD;  Location: [facility]IN MGT;  Service: Pain Management;  Laterality: Bilateral;    INTRAOCULAR PROSTHESES INSERTION Right 10/31/2018    Procedure: INSERTION-INTRAOCULAR LENS (IOL);  Surgeon: Keysha Valle MD;  Location: [facility] OR;  Service: Ophthalmology;  Laterality: Right;    JOINT REPLACEMENT      LASER LITHOTRIPSY Right 5/12/2023    Procedure: LITHOTRIPSY, USING LASER;  Surgeon: Laz Rudolph Jr., MD;  Location: [address];  Service: Urology;  Laterality: Right;    mastoid tumor removal  1969    Left mastoidectomy with residual left facial weakness.    NEPHROSTOMY      OOPHORECTOMY      PHACOEMULSIFICATION OF CATARACT Right 10/31/2018    Procedure: PHACOEMULSIFICATION-ASPIRATION-CATARACT;  Surgeon: Keysha Valle MD;  Location: [facility] OR;  Service: Ophthalmology;  Laterality: Right;    PYELOSCOPY Right 5/12/2023    Procedure: PYELOSCOPY;  Surgeon: Laz Rudolph Jr., MD;  Location: [facility] OR 1ST FLR;  Service:  Urology;  Laterality: Right;    REMOVAL, STENT, URETER Right 5/12/2023    Procedure: REMOVAL, STENT, URETER;  Surgeon: Laz Rudolph Jr., MD;  Location: [facility] OR 1ST FLR;  Service: Urology;  Laterality: Right;    RETROGRADE PYELOGRAPHY Right 4/19/2023    Procedure: PYELOGRAM, RETROGRADE;  Surgeon: Domo Bates MD;  Location: [facility] OR 1ST FLR;  Service: Urology;  Laterality: Right;    TOTAL ABDOMINAL HYSTERECTOMY  1969    TAHBSO    TOTAL KNEE ARTHROPLASTY  9/13/2012    Left    TRANSFORAMINAL EPIDURAL INJECTION OF STEROID Bilateral 9/17/2019    Procedure: INJECTION, STEROID, EPIDURAL, TRANSFORAMINAL APPROACH;  Surgeon: Oscar Ravi MD;  Location: [facility] PAIN [facility]GT;  Service: Pain Management;  Laterality: Bilateral;  B/L TFESI L4/L5    TRANSFORAMINAL EPIDURAL INJECTION OF STEROID Bilateral 10/2/2020    Procedure: INJECTION, STEROID, EPIDURAL, TRANSFORAMINAL APPROACH;  Surgeon: Oscar Ravi MD;  Location: [facility] PAIN MGT;  Service: Pain Management;  Laterality: Bilateral;  B/L TFESI L4/5    TRANSFORAMINAL EPIDURAL INJECTION OF STEROID Bilateral 5/7/2021    Procedure: INJECTION, STEROID, EPIDURAL, TRANSFORAMINAL APPROACH, L4-L5 clear to hold Eliquis 3 days;  Surgeon: Oscar Ravi MD;  Location: [facility]GT;  Service: Pain Management;  Laterality: Bilateral;    URETERAL STENT PLACEMENT Right 4/19/2023    Procedure: INSERTION, STENT, URETER;  Surgeon: Domo Bates MD;  Location: [facility] OR 1ST FLR;  Service: Urology;  Laterality: Right;    URETERAL STENT PLACEMENT N/A 5/12/2023    Procedure: INSERTION, STENT, URETER;  Surgeon: Laz Rudolph Jr., MD;  Location: [facility] OR 1ST FLR;  Service: Urology;  Laterality: N/A;    URETEROSCOPY Right 5/12/2023    Procedure: URETEROSCOPY;  Surgeon: Laz Rudolph Jr., MD;  Location: [facility] OR 1ST FLR;  Service: Urology;  Laterality: Right;       Family History   Problem Relation Age of Onset    Sleep apnea Sister     Diabetes Sister     Cancer Mother         brain tumor     Hypertension Mother     Heart disease Father     Heart attack Father     Dementia Brother     Lupus Brother     Frontotemporal dementia Brother     No Known Problems Daughter     No Known Problems Son     Diabetes Sister     Lupus Sister     Breast cancer Sister     Diabetes Sister     Cancer Sister         breast cancer    Heart attack Sister     Diabetes Sister     Liver cancer Brother     Drug abuse Brother     Alcohol abuse Brother     Cirrhosis Brother     Drug abuse Brother     No Known Problems Daughter     No Known Problems Son     No Known Problems Son     No Known Problems Maternal Grandmother     No Known Problems Maternal Grandfather     No Known Problems Paternal Grandmother     No Known Problems Paternal Grandfather     Diabetes Brother     Dementia Brother     Diabetes Other     Ovarian cancer Other         Niece     Breast cancer Maternal Aunt     No Known Problems Maternal Uncle     No Known Problems Paternal Aunt     No Known Problems Paternal Uncle     Glaucoma Neg Hx     Blindness Neg Hx     Celiac disease Neg Hx     Colon cancer Neg Hx     Colon polyps Neg Hx     Esophageal cancer Neg Hx     Inflammatory bowel disease Neg Hx     Liver disease Neg Hx     Rectal cancer Neg Hx     Stomach cancer Neg Hx     Ulcerative colitis Neg Hx     Melanoma Neg Hx     Multiple sclerosis Neg Hx     Psoriasis Neg Hx     Amblyopia Neg Hx     Cataracts Neg Hx     Macular degeneration Neg Hx     Retinal detachment Neg Hx     Strabismus Neg Hx     Stroke Neg Hx     Thyroid disease Neg Hx        Social History     Socioeconomic History    Marital status: Single    Number of children: 5   Tobacco Use    Smoking status: Former     Packs/day: 1.00     Years: 15.00     Pack years: 15.00     Types: Cigarettes     Quit date: 2000     Years since quittin.8    Smokeless tobacco: Never   Substance and Sexual Activity    Alcohol use: No    Drug use: No    Sexual activity: Never   Social History Narrative    Single  with 5 children. Lives alone. Retired cook.     Social Determinants of Health     Financial Resource Strain: Medium Risk    Difficulty of Paying Living Expenses: Somewhat hard   Food Insecurity: No Food Insecurity    Worried About Running Out of Food in the Last Year: Never true    Ran Out of Food in the Last Year: Never true   Transportation Needs: No Transportation Needs    Lack of Transportation (Medical): No    Lack of Transportation (Non-Medical): No   Physical Activity: Insufficiently Active    Days of Exercise per Week: 5 days    Minutes of Exercise per Session: 10 min   Stress: No Stress Concern Present    Feeling of Stress : Only a little   Social Connections: Unknown    Frequency of Communication with Friends and Family: Never    Frequency of Social Gatherings with Friends and Family: Never    Attends Synagogue Services: Never    Active Member of Clubs or Organizations: No    Attends Club or Organization Meetings: Never   Housing Stability: Low Risk     Unable to Pay for Housing in the Last Year: No    Number of Places Lived in the Last Year: 1    Unstable Housing in the Last Year: No       Allergies:  Crestor [rosuvastatin], Ezetimibe, Hydrocodone, Lisinopril, Sulfa (sulfonamide antibiotics), Sulfamethoxazole-trimethoprim, and Valsartan    Medications:    Current Outpatient Medications:     ACCU-CHEK GUIDE TEST STRIPS Strp, TEST BLOOD SUGAR THREE TIMES DAILY, Disp: 300 strip, Rfl: 3    ACCU-CHEK SOFT DEV LANCETS Kit, , Disp: , Rfl:     albuterol (PROVENTIL/VENTOLIN HFA) 90 mcg/actuation inhaler, Inhale 2 puffs by mouth into the lungs every 4 (four) hours as needed for Wheezing. Rescue, Disp: 25.5 g, Rfl: 3    amLODIPine (NORVASC) 10 MG tablet, Take 1 tablet (10 mg total) by mouth once daily., Disp: 30 tablet, Rfl: 0    apixaban (ELIQUIS) 5 mg Tab, Take 1 tablet (5 mg total) by mouth 2 (two) times daily., Disp: 60 tablet, Rfl: 0    atorvastatin (LIPITOR) 40 MG tablet, Take 40 mg by mouth once daily., Disp:  ", Rfl:     BD ULTRA-FINE OLU PEN NEEDLE 32 gauge x 5/32" Ndle, Uses 4 times daily, on multiple daily insulin injections, Disp: 130 each, Rfl: 12    carvediloL (COREG) 25 MG tablet, Take 1 tablet (25 mg total) by mouth 2 (two) times daily., Disp: 60 tablet, Rfl: 0    cholecalciferol, vitamin D3, 125 mcg (5,000 unit) capsule, Take 5,000 Units by mouth once daily., Disp: , Rfl:     DROPLET PEN NEEDLE 29 gauge x 1/2" Ndle, USE FOUR TIMES DAILY, Disp: 400 each, Rfl: 3    DROPSAFE ALCOHOL PREP PADS PadM, USE TOPICALLY TO TEST BLOOD SUGAR FOUR TIMES DAILY, Disp: 400 each, Rfl: 3    dulaglutide (TRULICITY) 0.75 mg/0.5 mL pen injector, Inject 0.75 mg into the skin every 7 days., Disp: 4 pen, Rfl: 4    DULoxetine (CYMBALTA) 60 MG capsule, Take 1 capsule (60 mg total) by mouth 2 (two) times daily. Please hold until you follow up with your primary care provider, Disp: 180 capsule, Rfl: 1    fluticasone propionate (FLONASE) 50 mcg/actuation nasal spray, Instill 2 sprays in each nostril once daily., Disp: 16 g, Rfl: 1    insulin (LANTUS SOLOSTAR U-100 INSULIN) glargine 100 units/mL SubQ pen, Inject 45 Units into the skin once daily., Disp: 45 mL, Rfl: 3    insulin aspart U-100 (NOVOLOG) 100 unit/mL (3 mL) InPn pen, Inject 15 units before meals, hold if sugar is less than 100. Max daily 45 units., Disp: 45 mL, Rfl: 3    lancets (ACCU-CHEK SOFTCLIX LANCETS) Misc, TEST BLOOD SUGAR FOUR TIMES DAILY, Disp: 400 each, Rfl: 3    olopatadine (PATADAY) 0.2 % Drop, Place 1 drop into both eyes once daily., Disp: 2.5 mL, Rfl: 0    oxyCODONE-acetaminophen (PERCOCET)  mg per tablet, Take 1 tablet by mouth 5 (five) times daily., Disp: 150 tablet, Rfl: 0    pantoprazole (PROTONIX) 40 MG tablet, Take 1 tablet (40 mg total) by mouth once daily., Disp: 90 tablet, Rfl: 1    rOPINIRole (REQUIP) 0.5 MG tablet, Take 1 tablet (0.5 mg total) by mouth every evening., Disp: 30 tablet, Rfl: 0    tamsulosin (FLOMAX) 0.4 mg Cap, Take 1 capsule (0.4 mg " total) by mouth every evening., Disp: 30 capsule, Rfl: 0    blood-glucose meter kit, Use as instructed, Disp: 1 each, Rfl: 0    Review of Systems    Objective:      Physical Exam    Assessment:       1. Renal calculi    2. Post-operative state        Plan:       Henrietta was seen today for ureteral calculus.    Diagnoses and all orders for this visit:    Renal calculi    Post-operative state

## 2023-06-12 NOTE — TELEPHONE ENCOUNTER
Ok to stop elquis 2 days prior surgery. Please let patient know- make sure she understands- she is having some memory issues

## 2023-06-13 NOTE — TELEPHONE ENCOUNTER
Called Gladis with Urology to review PCP note, no answer. Left message to return call. Spoke to pt, explained to pt that she should hold her Eliquis for 2 days prior to surgery. Pt verbalized understanding.

## 2023-06-14 LAB — BACTERIA UR CULT: ABNORMAL

## 2023-06-15 ENCOUNTER — CLINICAL SUPPORT (OUTPATIENT)
Dept: INTERNAL MEDICINE | Facility: CLINIC | Age: 83
End: 2023-06-15
Payer: MEDICARE

## 2023-06-15 DIAGNOSIS — E11.9 TYPE 2 DIABETES MELLITUS WITHOUT COMPLICATION, WITH LONG-TERM CURRENT USE OF INSULIN: Primary | ICD-10-CM

## 2023-06-15 DIAGNOSIS — Z79.4 TYPE 2 DIABETES MELLITUS WITHOUT COMPLICATION, WITH LONG-TERM CURRENT USE OF INSULIN: Primary | ICD-10-CM

## 2023-06-16 ENCOUNTER — TELEPHONE (OUTPATIENT)
Dept: DIABETES | Facility: CLINIC | Age: 83
End: 2023-06-16
Payer: MEDICARE

## 2023-06-21 DIAGNOSIS — K21.9 GASTROESOPHAGEAL REFLUX DISEASE, UNSPECIFIED WHETHER ESOPHAGITIS PRESENT: ICD-10-CM

## 2023-06-21 RX ORDER — PANTOPRAZOLE SODIUM 40 MG/1
TABLET, DELAYED RELEASE ORAL
Qty: 90 TABLET | Refills: 3 | Status: SHIPPED | OUTPATIENT
Start: 2023-06-21 | End: 2023-07-05 | Stop reason: SDUPTHER

## 2023-06-21 NOTE — TELEPHONE ENCOUNTER
Refill Decision Note   Henrietta Hamilton  is requesting a refill authorization.  Brief Assessment and Rationale for Refill:        Medication Therapy Plan:       Medication Reconciliation Completed: No   Comments:     No Care Gaps recommended.     Note composed:12:01 PM 06/21/2023

## 2023-06-21 NOTE — TELEPHONE ENCOUNTER
No care due was identified.  [facility] Embedded Care Due Messages. Reference number: [id number].   6/21/2023 10:33:19 AM CDT

## 2023-07-05 ENCOUNTER — OFFICE VISIT (OUTPATIENT)
Dept: INTERNAL MEDICINE | Facility: CLINIC | Age: 83
End: 2023-07-05
Payer: MEDICARE

## 2023-07-05 VITALS
OXYGEN SATURATION: 96 % | SYSTOLIC BLOOD PRESSURE: 122 MMHG | WEIGHT: 208.13 LBS | HEART RATE: 89 BPM | HEIGHT: 68 IN | BODY MASS INDEX: 31.54 KG/M2 | DIASTOLIC BLOOD PRESSURE: 80 MMHG

## 2023-07-05 DIAGNOSIS — K21.9 GASTROESOPHAGEAL REFLUX DISEASE WITHOUT ESOPHAGITIS: ICD-10-CM

## 2023-07-05 DIAGNOSIS — I25.119 CORONARY ARTERY DISEASE INVOLVING NATIVE CORONARY ARTERY OF NATIVE HEART WITH ANGINA PECTORIS: ICD-10-CM

## 2023-07-05 DIAGNOSIS — M10.9 GOUT, ARTHRITIS: ICD-10-CM

## 2023-07-05 DIAGNOSIS — N18.30 TYPE 2 DIABETES MELLITUS WITH STAGE 3 CHRONIC KIDNEY DISEASE, WITH LONG-TERM CURRENT USE OF INSULIN: ICD-10-CM

## 2023-07-05 DIAGNOSIS — I10 ESSENTIAL HYPERTENSION: Chronic | ICD-10-CM

## 2023-07-05 DIAGNOSIS — E11.42 TYPE 2 DIABETES MELLITUS WITH DIABETIC POLYNEUROPATHY, WITH LONG-TERM CURRENT USE OF INSULIN: ICD-10-CM

## 2023-07-05 DIAGNOSIS — Z79.4 TYPE 2 DIABETES MELLITUS WITH STAGE 3 CHRONIC KIDNEY DISEASE, WITH LONG-TERM CURRENT USE OF INSULIN: ICD-10-CM

## 2023-07-05 DIAGNOSIS — K21.9 GASTROESOPHAGEAL REFLUX DISEASE, UNSPECIFIED WHETHER ESOPHAGITIS PRESENT: ICD-10-CM

## 2023-07-05 DIAGNOSIS — I50.32 CHRONIC DIASTOLIC CONGESTIVE HEART FAILURE: ICD-10-CM

## 2023-07-05 DIAGNOSIS — I70.0 AORTIC ATHEROSCLEROSIS: Primary | ICD-10-CM

## 2023-07-05 DIAGNOSIS — Z79.4 TYPE 2 DIABETES MELLITUS WITH DIABETIC POLYNEUROPATHY, WITH LONG-TERM CURRENT USE OF INSULIN: ICD-10-CM

## 2023-07-05 DIAGNOSIS — E11.22 TYPE 2 DIABETES MELLITUS WITH STAGE 3 CHRONIC KIDNEY DISEASE, WITH LONG-TERM CURRENT USE OF INSULIN: ICD-10-CM

## 2023-07-05 DIAGNOSIS — E78.2 MIXED HYPERLIPIDEMIA: ICD-10-CM

## 2023-07-05 PROCEDURE — 3079F PR MOST RECENT DIASTOLIC BLOOD PRESSURE 80-89 MM HG: ICD-10-PCS | Mod: CPTII,S$GLB,, | Performed by: INTERNAL MEDICINE

## 2023-07-05 PROCEDURE — 3074F SYST BP LT 130 MM HG: CPT | Mod: CPTII,S$GLB,, | Performed by: INTERNAL MEDICINE

## 2023-07-05 PROCEDURE — 99214 OFFICE O/P EST MOD 30 MIN: CPT | Mod: S$GLB,,, | Performed by: INTERNAL MEDICINE

## 2023-07-05 PROCEDURE — 99999 PR PBB SHADOW E&M-EST. PATIENT-LVL II: ICD-10-PCS | Mod: PBBFAC,,, | Performed by: INTERNAL MEDICINE

## 2023-07-05 PROCEDURE — 3079F DIAST BP 80-89 MM HG: CPT | Mod: CPTII,S$GLB,, | Performed by: INTERNAL MEDICINE

## 2023-07-05 PROCEDURE — 3074F PR MOST RECENT SYSTOLIC BLOOD PRESSURE < 130 MM HG: ICD-10-PCS | Mod: CPTII,S$GLB,, | Performed by: INTERNAL MEDICINE

## 2023-07-05 PROCEDURE — 99999 PR PBB SHADOW E&M-EST. PATIENT-LVL II: CPT | Mod: PBBFAC,,, | Performed by: INTERNAL MEDICINE

## 2023-07-05 PROCEDURE — 99214 PR OFFICE/OUTPT VISIT, EST, LEVL IV, 30-39 MIN: ICD-10-PCS | Mod: S$GLB,,, | Performed by: INTERNAL MEDICINE

## 2023-07-05 RX ORDER — DULAGLUTIDE 0.75 MG/.5ML
0.75 INJECTION, SOLUTION SUBCUTANEOUS
Qty: 4 PEN | Refills: 4 | Status: SHIPPED | OUTPATIENT
Start: 2023-07-05 | End: 2023-11-10 | Stop reason: SDUPTHER

## 2023-07-05 RX ORDER — INSULIN GLARGINE 100 [IU]/ML
40 INJECTION, SOLUTION SUBCUTANEOUS DAILY
Start: 2023-07-05 | End: 2024-03-06 | Stop reason: SDUPTHER

## 2023-07-05 RX ORDER — CARVEDILOL 25 MG/1
25 TABLET ORAL 2 TIMES DAILY
Qty: 180 TABLET | Refills: 1 | Status: SHIPPED | OUTPATIENT
Start: 2023-07-05 | End: 2023-10-04 | Stop reason: SDUPTHER

## 2023-07-05 RX ORDER — PANTOPRAZOLE SODIUM 40 MG/1
40 TABLET, DELAYED RELEASE ORAL DAILY
Qty: 90 TABLET | Refills: 1 | Status: SHIPPED | OUTPATIENT
Start: 2023-07-05 | End: 2023-10-04 | Stop reason: SDUPTHER

## 2023-07-05 RX ORDER — ATORVASTATIN CALCIUM 40 MG/1
40 TABLET, FILM COATED ORAL DAILY
Qty: 90 TABLET | Refills: 1 | Status: SHIPPED | OUTPATIENT
Start: 2023-07-05 | End: 2024-01-19 | Stop reason: SDUPTHER

## 2023-07-05 RX ORDER — OXYCODONE AND ACETAMINOPHEN 10; 325 MG/1; MG/1
1 TABLET ORAL
Qty: 150 TABLET | Refills: 0 | Status: SHIPPED | OUTPATIENT
Start: 2023-07-05 | End: 2023-08-07 | Stop reason: SDUPTHER

## 2023-07-05 NOTE — PROGRESS NOTES
CHIEF COMPLAINT:   Follow up of multple problems.      HISTORY OF PRESENT ILLNESS: This is a 83-year-old woman who presents for follow up of above    She is taking Trulicity 0.75 m g once a week on a Thursday. She is taking lantus 40 units nighlty, and novolog 15 units with eating.      She is checking her blood sugars 3 times daily with test strips.  She is eating 2-3 times daily. She is eating better.  She is taking Lantus 40 units at night and Novolog 14 units when eating.  Blood sugars range 90 to 150.      No cough, fever, chills, sinus congestion, sore throat. She contineus to have some shortness of breath.      She has her pill bottles with her today. She does not have atorvastatin, percocet or her insulins. She states she is her percocet is at home - has 3-4 left    She has empty bottles of pantoprazole, carvedilol. Furosemide.  States she just ran out of carvedilol yesterday. .      She has had itching on both arms and some of her legs.  No rash. Itching comes and goes. She takes benadryl at night which helps her sleep.      She continues to have neck and shoulder pain, back pain. She complains of back pain and body pain  which has been severe. She is taking oxycodone apap 10/325 up to 5 times daily.   She continues to take duloxetine 60 mg twice daily.  Her mood is doing ok.        She saw Dr Reilly on 1/18/22 - she continues to take Eliquis 5 mg wice daily.  She had cardiac PET stress 2/9/2022 which was negative for ischemia          No fever or chills. She has felt cold.  No nausea, vomiting, constipation, dysuria, hematuira.  She denies swallowing problems today.        She is living alone. She does not see her grandson very much. She has a niece, Kacie Mast who lives locally.      She is taking  amlodipine 10 mg 1 daily, coreg 25 mg twice daily for her hypertension and diastolic dysfunction.     Seh is taking requipo 0.5  mg at bedtime - for helping the restless leg     She had a right ureteroscopy  "with laser lithotripsy and stone basketing on 5/12/23.  Her pain from the stone has resolved.  She still has a urinary stent.         PAST MEDICAL HISTORY:   1. Diabetes mellitus   2. Hypertension   3. Hyperlipidemia   4. Left heart catheterization January 2007 which revealed luminal irregularities in the LAD, left circumflex and right coronary artery. She had diastolic dysfunction and patent renal arteries.   5. Sleep apnea   6. Obesity.   7. Nephrolithiasis status post lithotripsy.   8. Reflux - had nonerosive gastropathy on EGD September 2007. Gastritis on EGD 9/2010  9. Hidradenitis suppurativa.   10. History of diverticulitis with a hospitalized October 2007.   11. Migraines   12. Obesity.   13. 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    14. Total hysterectomy in 1969.   15. Cholecystectomy was done at that time as well.   16. 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   17. Colon polyps on colonscopy 11/2010 - due 2013   18. Hospitalization 12/10 for e coli sepsis due to left ureteral stone with left nephrostomy tube in [place], [place]   19. Left knee replacement 9/2012      MEDICATIONS and ALLERGIES: Updated on EPIC.     PHYSICAL EXAMINATION:        /80   Pulse 89   Ht 5' 8" (1.727 m)   Wt 94.4 kg (208 lb 1.8 oz)   LMP  (LMP Unknown)   SpO2 96%   BMI 31.64 kg/m²      GENERAL: She is alert, oriented, no apparent distress. Affect within   normal limits.  Conjunctiva anicteric.    NECK: Supple.   Respiratory: Effort normal. Lungs clear  HEART: Regular rate and rhythm without murmurs, gallops or rubs.   No lower extremity edema.  .            ASSESSMENT AND PLAN:      1. Diabetes - doing better - follow up with Marco Antonio matamoros.   2. Kidney stones - follow up with urology  3. Hypertension with diastolic heart failure- stable. Negative PET stress.  stable on amlodipine to 10 mg  one tablet daily and carvedilol 25 mg one tablet " twice daily. Rx sent to pharmacy   4.  OA neck and lumbar spine - on oxycodone. stable  5.  Gout - asx off allopurinol  6.  Mood disorder -  on duloxetine 60 mg twice daily  7.  Swallowing disorder - s/p EGD with dilation on 11/11/21.   8. Asthma - stable   9. Gerd -get on pantoprazole 40 mg  daily   10. Hyperlipidemia - off  lipitor  11. Allergic rhinitis - stable  12. CRI -stable  13. Obesity - discussed diet, exercise and weight loss  14. CAD -risk factor modification  15. Aortic atherosclerosis and possible mesenteric artery stenosis- risk factor modification  16. Tortuous aorta - HTN controlled  17. Colon polyps - Colonoscopy in 8/2013 - 2 polyps. Flex sig 11/2013 - mild granular mucosa. Colonoscopy 6/2017 diverticulosis - no polyps due 2023.   18. hyperparathryodisism -saw nephrology   19. .Restless leg syndrome - on roprinole       .  MMG 7/22        I will see her back in 1 month sooner if problems arise

## 2023-07-06 ENCOUNTER — HOSPITAL ENCOUNTER (OUTPATIENT)
Dept: RADIOLOGY | Facility: HOSPITAL | Age: 83
Discharge: HOME | End: 2023-07-06
Attending: UROLOGY
Payer: MEDICARE

## 2023-07-06 ENCOUNTER — TELEPHONE (OUTPATIENT)
Dept: UROLOGY | Facility: CLINIC | Age: 83
End: 2023-07-06
Payer: MEDICARE

## 2023-07-06 DIAGNOSIS — N20.1 URETERAL CALCULUS: ICD-10-CM

## 2023-07-06 PROCEDURE — 74018 RADEX ABDOMEN 1 VIEW: CPT | Mod: TC

## 2023-07-06 PROCEDURE — 74018 RADEX ABDOMEN 1 VIEW: CPT | Mod: 26,,, | Performed by: RADIOLOGY

## 2023-07-06 PROCEDURE — 74018 XR ABDOMEN AP 1 VIEW: ICD-10-PCS | Mod: 26,,, | Performed by: RADIOLOGY

## 2023-07-06 NOTE — TELEPHONE ENCOUNTER
Called pt to confirm arrival time of 915am for procedure on 07-. Gave pt NPO instructions and gave pt opportunity to ask questions. Pt verbalized understanding.

## 2023-07-06 NOTE — PRE-PROCEDURE INSTRUCTIONS
PreOp Instructions given:   - Verbal medication information (what to hold and what to take)   - NPO guidelines   - Arrival place directions given; time to be given the day before procedure by the   Surgeon's Office [facility] [place]  - Bathing with antibacterial soap   - Don't wear any jewelry or bring any valuables AM of surgery   - No makeup or moisturizer to face   - No perfume/cologne, powder, lotions or aftershave   Pt. verbalized understanding.   Pt denies any h/o Anesthesia/Sedation complications or side effects.

## 2023-07-07 ENCOUNTER — ANESTHESIA EVENT (OUTPATIENT)
Dept: SURGERY | Facility: HOSPITAL | Age: 83
End: 2023-07-07
Payer: MEDICARE

## 2023-07-07 ENCOUNTER — HOSPITAL ENCOUNTER (OUTPATIENT)
Facility: HOSPITAL | Age: 83
Discharge: HOME | End: 2023-07-07
Attending: UROLOGY | Admitting: UROLOGY
Payer: MEDICARE

## 2023-07-07 ENCOUNTER — ANESTHESIA (OUTPATIENT)
Dept: SURGERY | Facility: HOSPITAL | Age: 83
End: 2023-07-07
Payer: MEDICARE

## 2023-07-07 VITALS
SYSTOLIC BLOOD PRESSURE: 145 MMHG | WEIGHT: 208 LBS | RESPIRATION RATE: 26 BRPM | DIASTOLIC BLOOD PRESSURE: 70 MMHG | HEART RATE: 72 BPM | TEMPERATURE: 97 F | BODY MASS INDEX: 31.52 KG/M2 | HEIGHT: 68 IN | OXYGEN SATURATION: 94 %

## 2023-07-07 DIAGNOSIS — N20.0 NEPHROLITHIASIS: Primary | ICD-10-CM

## 2023-07-07 LAB
POCT GLUCOSE: 100 MG/DL (ref 70–110)
POCT GLUCOSE: 92 MG/DL (ref 70–110)

## 2023-07-07 PROCEDURE — D9220A PRA ANESTHESIA: ICD-10-PCS | Mod: ANES,,, | Performed by: ANESTHESIOLOGY

## 2023-07-07 PROCEDURE — C1769 GUIDE WIRE: HCPCS | Performed by: UROLOGY

## 2023-07-07 PROCEDURE — D9220A PRA ANESTHESIA: Mod: CRNA,,, | Performed by: STUDENT IN AN ORGANIZED HEALTH CARE EDUCATION/TRAINING PROGRAM

## 2023-07-07 PROCEDURE — D9220A PRA ANESTHESIA: ICD-10-PCS | Mod: CRNA,,, | Performed by: STUDENT IN AN ORGANIZED HEALTH CARE EDUCATION/TRAINING PROGRAM

## 2023-07-07 PROCEDURE — 37000008 HC ANESTHESIA 1ST 15 MINUTES: Performed by: UROLOGY

## 2023-07-07 PROCEDURE — 36000707: Performed by: UROLOGY

## 2023-07-07 PROCEDURE — 25000003 PHARM REV CODE 250: Performed by: STUDENT IN AN ORGANIZED HEALTH CARE EDUCATION/TRAINING PROGRAM

## 2023-07-07 PROCEDURE — 63600175 PHARM REV CODE 636 W HCPCS: Performed by: STUDENT IN AN ORGANIZED HEALTH CARE EDUCATION/TRAINING PROGRAM

## 2023-07-07 PROCEDURE — C1894 INTRO/SHEATH, NON-LASER: HCPCS | Performed by: UROLOGY

## 2023-07-07 PROCEDURE — 71000044 HC DOSC ROUTINE RECOVERY FIRST HOUR: Performed by: UROLOGY

## 2023-07-07 PROCEDURE — 52356 CYSTO/URETERO W/LITHOTRIPSY: CPT | Mod: RT,,, | Performed by: UROLOGY

## 2023-07-07 PROCEDURE — 36000706: Performed by: UROLOGY

## 2023-07-07 PROCEDURE — 27201423 OPTIME MED/SURG SUP & DEVICES STERILE SUPPLY: Performed by: UROLOGY

## 2023-07-07 PROCEDURE — C2617 STENT, NON-COR, TEM W/O DEL: HCPCS | Performed by: UROLOGY

## 2023-07-07 PROCEDURE — 82962 GLUCOSE BLOOD TEST: CPT | Mod: 91 | Performed by: UROLOGY

## 2023-07-07 PROCEDURE — 71000015 HC POSTOP RECOV 1ST HR: Performed by: UROLOGY

## 2023-07-07 PROCEDURE — 52356 PR CYSTO/URETERO W/LITHOTRIPSY: ICD-10-PCS | Mod: RT,,, | Performed by: UROLOGY

## 2023-07-07 PROCEDURE — 37000009 HC ANESTHESIA EA ADD 15 MINS: Performed by: UROLOGY

## 2023-07-07 PROCEDURE — 82962 GLUCOSE BLOOD TEST: CPT | Performed by: UROLOGY

## 2023-07-07 PROCEDURE — D9220A PRA ANESTHESIA: Mod: ANES,,, | Performed by: ANESTHESIOLOGY

## 2023-07-07 DEVICE — STENT URETERAL UNIV 6FR 26CM: Type: IMPLANTABLE DEVICE | Site: URETER | Status: FUNCTIONAL

## 2023-07-07 RX ORDER — PROPOFOL 10 MG/ML
VIAL (ML) INTRAVENOUS
Status: DISCONTINUED | OUTPATIENT
Start: 2023-07-07 | End: 2023-07-07

## 2023-07-07 RX ORDER — LIDOCAINE HYDROCHLORIDE 20 MG/ML
INJECTION INTRAVENOUS
Status: DISCONTINUED | OUTPATIENT
Start: 2023-07-07 | End: 2023-07-07

## 2023-07-07 RX ORDER — FENTANYL CITRATE 50 UG/ML
INJECTION, SOLUTION INTRAMUSCULAR; INTRAVENOUS
Status: DISCONTINUED | OUTPATIENT
Start: 2023-07-07 | End: 2023-07-07

## 2023-07-07 RX ORDER — FENTANYL CITRATE 50 UG/ML
25 INJECTION, SOLUTION INTRAMUSCULAR; INTRAVENOUS EVERY 5 MIN PRN
Status: DISCONTINUED | OUTPATIENT
Start: 2023-07-07 | End: 2023-07-07 | Stop reason: HOSPADM

## 2023-07-07 RX ORDER — ROCURONIUM BROMIDE 10 MG/ML
INJECTION, SOLUTION INTRAVENOUS
Status: DISCONTINUED | OUTPATIENT
Start: 2023-07-07 | End: 2023-07-07

## 2023-07-07 RX ORDER — ONDANSETRON 2 MG/ML
INJECTION INTRAMUSCULAR; INTRAVENOUS
Status: DISCONTINUED | OUTPATIENT
Start: 2023-07-07 | End: 2023-07-07

## 2023-07-07 RX ORDER — DEXAMETHASONE SODIUM PHOSPHATE 4 MG/ML
INJECTION, SOLUTION INTRA-ARTICULAR; INTRALESIONAL; INTRAMUSCULAR; INTRAVENOUS; SOFT TISSUE
Status: DISCONTINUED | OUTPATIENT
Start: 2023-07-07 | End: 2023-07-07

## 2023-07-07 RX ORDER — SODIUM CHLORIDE 9 MG/ML
INJECTION, SOLUTION INTRAVENOUS CONTINUOUS PRN
Status: DISCONTINUED | OUTPATIENT
Start: 2023-07-07 | End: 2023-07-07

## 2023-07-07 RX ADMIN — SUGAMMADEX 200 MG: 100 INJECTION, SOLUTION INTRAVENOUS at 12:07

## 2023-07-07 RX ADMIN — FENTANYL CITRATE 50 MCG: 50 INJECTION, SOLUTION INTRAMUSCULAR; INTRAVENOUS at 11:07

## 2023-07-07 RX ADMIN — PROPOFOL 150 MG: 10 INJECTION, EMULSION INTRAVENOUS at 11:07

## 2023-07-07 RX ADMIN — LIDOCAINE HYDROCHLORIDE 75 MG: 20 INJECTION INTRAVENOUS at 11:07

## 2023-07-07 RX ADMIN — SODIUM CHLORIDE: 0.9 INJECTION, SOLUTION INTRAVENOUS at 11:07

## 2023-07-07 RX ADMIN — ONDANSETRON 4 MG: 2 INJECTION INTRAMUSCULAR; INTRAVENOUS at 11:07

## 2023-07-07 RX ADMIN — DEXAMETHASONE SODIUM PHOSPHATE 4 MG: 4 INJECTION, SOLUTION INTRAMUSCULAR; INTRAVENOUS at 11:07

## 2023-07-07 RX ADMIN — CEFAZOLIN 2 G: 2 INJECTION, POWDER, FOR SOLUTION INTRAMUSCULAR; INTRAVENOUS at 11:07

## 2023-07-07 RX ADMIN — ROCURONIUM BROMIDE 40 MG: 10 INJECTION, SOLUTION INTRAVENOUS at 11:07

## 2023-07-07 NOTE — TRANSFER OF CARE
"Anesthesia Transfer of Care Note    Patient: Henrietta Villasenor    Procedure(s) Performed: Procedure(s) (LRB):  CYSTOSCOPY (N/A)  URETEROSCOPY (Right)  PYELOSCOPY (Right)  LITHOTRIPSY, USING LASER (Right)  REMOVAL, STENT, URETER (Right)  INSERTION, STENT, URETER (Right)    Patient location: [facility]    Anesthesia Type: general    Transport from OR: Transported from OR on 6-10 L/min O2 by face mask with adequate spontaneous ventilation    Post pain: adequate analgesia    Post assessment: no apparent anesthetic complications and tolerated procedure well    Post vital signs: stable    Level of consciousness: awake    Nausea/Vomiting: no nausea/vomiting    Complications: none    Transfer of care protocol was followed      Last vitals:   Visit Vitals  BP (!) 164/79 (BP Location: Left arm, Patient Position: Lying)   Pulse 74   Temp 36.8 °C (98.2 °F) (Temporal)   Resp 12   Ht 5' 8" (1.727 m)   Wt 94.3 kg (208 lb)   LMP  (LMP Unknown)   SpO2 97%   Breastfeeding No   BMI 31.63 kg/m²     "

## 2023-07-07 NOTE — PROGRESS NOTES
Plan of care reviewed with patient and family.  Understanding verbalized.  VSS, tolerating PO, denies nausea, pain well controlled. RN reviewed all discharge instructions.  Questions answered.  Patient verbalized understanding.

## 2023-07-07 NOTE — BRIEF OP NOTE
[facility] - [facility] (1st Fl)  Brief Operative Note    Surgery Date: 7/7/2023     Surgeon(s) and Role:     * Laz Rudolph Jr., MD - Primary     * Martir Patton MD - Resident - Assisting  Luis Flores DO      Pre-op Diagnosis:  Renal calculi [N20.0]    Post-op Diagnosis:  Post-Op Diagnosis Codes:     * Renal calculi [N20.0]    Procedure(s) (LRB):  CYSTOSCOPY (N/A)  URETEROSCOPY (Right)  PYELOSCOPY (Right)  LITHOTRIPSY, USING LASER (Right)  REMOVAL, STENT, URETER (Right)  INSERTION, STENT, URETER (Right)    Anesthesia: General    Operative Findings:   -Small right stone debris in right kidney from previous URS. Stone debris dusted. All fragments small enough to pass on their own  -6x26 right stent with strings    Estimated Blood Loss: * No values recorded between 7/7/2023 11:31 AM and 7/7/2023 12:04 PM *         Specimens:   Specimen (24h ago, onward)      None              Discharge Note    OUTCOME: Patient tolerated treatment/procedure well without complication and is now ready for discharge.    DISPOSITION: Home or Self Care    FINAL DIAGNOSIS:  nephrolithiasis    FOLLOWUP: In clinic    DISCHARGE INSTRUCTIONS:  No discharge procedures on file.

## 2023-07-07 NOTE — INTERVAL H&P NOTE
The patient has been examined and the H&P has been reviewed:    I concur with the findings and no changes have occurred since H&P was written.    Surgery risks, benefits and alternative options discussed and understood by patient/family.    Urine dipstick - negative for all components.    Stopped Eliquis. Last dose Tuesday      There are no hospital problems to display for this patient.

## 2023-07-07 NOTE — ANESTHESIA PREPROCEDURE EVALUATION
[facility]  Anesthesia Pre-Operative Evaluation       07/07/2023    Henrietta Villasenor is a 83 y.o. 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    Neuralgia, neuritis, and radiculitis, unspecified    Abdominal pain, unspecified site    Ureteral calculus    Dermatitis due to drugs and medicines taken internally(693.0)    Dermatochalasis    Personal history of allergy to sulfonamides    Diastolic congestive heart failure    Knee pain    Anemia    Chronic right ear pain    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    Hearing loss, sensorineural    Stage 3a chronic kidney disease    Coronary artery disease involving native coronary artery of native heart with angina pectoris    Lactose intolerance    Allergic rhinitis    Hyperlipidemia    Aortic atherosclerosis    Spinal enthesopathy of lumbar region    LLQ abdominal pain    Diverticulitis of large intestine without perforation or abscess without bleeding    Esophageal dysphagia    Fatty liver    Painful swallowing    Left facial numbness    Neck pain on left side    Type 2 diabetes mellitus, with long-term current use of insulin    Shortness of breath    Diabetic neuropathy associated with type 2 diabetes mellitus    Type 2 diabetes mellitus with stage 3 chronic kidney disease, with long-term current use of insulin    Obesity (BMI 30.0-34.9)    Low hemoglobin and low hematocrit    History of colon polyps    Type 2 diabetes mellitus with diabetic polyneuropathy, with long-term current use of insulin    Hyperparathyroidism, unspecified    Obesity, diabetes, and hypertension syndrome    Spinal stenosis of lumbar region with neurogenic claudication     Senile nuclear sclerosis    Benign paroxysmal positional vertigo due to bilateral vestibular disorder    Tortuous aorta    Ectatic aorta    Insulin long-term use    Facet arthropathy    Mood disorder    Type 2 diabetes mellitus with hyperglycemia    Hypercholesterolemia    Hypovitaminosis D    Research study patient - TACT2 EDTA Chelation Study    History of MI (myocardial infarction)    Facial nerve palsy, secondary    Gout, arthritis    Vitreomacular adhesion, right    Epiretinal membrane, left    Chronic pain    Decreased ROM of lumbar spine    Weakness    Abnormality of gait and mobility    Decreased functional activity tolerance    Chest pain    Postmenopausal    Osteopenia    Thyroid nodule    Bilateral leg edema    Neuropathic pain    Hematoma of arm, left, initial encounter    Deep vein thrombosis (DVT) of popliteal vein of both lower extremities    PAD (peripheral artery disease)    Memory loss    Venous insufficiency of both lower extremities    Chronic pain disorder    Decreased activities of daily living (ADL)    Impaired functional mobility, balance, gait, and endurance    Left leg pain    Chronic bilateral low back pain with sciatica    At risk for falls    HANNAH (acute kidney injury)    Hydronephrosis with urinary obstruction due to ureteral calculus    History of DVT (deep vein thrombosis)    Hypoglycemia associated with diabetes    Hypomagnesemia    Hypokalemia       Review of patient's allergies indicates:   Allergen Reactions    Crestor [rosuvastatin]      Cramping in legs    Ezetimibe Diarrhea     Other reaction(s): abdominal pain, Diarrhea      Hydrocodone Itching    Lisinopril      Other reaction(s): cough      Sulfa (sulfonamide antibiotics) Itching and Rash           Sulfamethoxazole-trimethoprim     Valsartan Swelling       Current Outpatient Medications:  No current outpatient medications on file.    Past Surgical History:   Procedure  Laterality Date    BELPHAROPTOSIS REPAIR      s/p LAMBERTO levator repair - Dr. Dejesus    CARDIAC CATHETERIZATION      CARPAL TUNNEL RELEASE  3/13/2012    CATARACT EXTRACTION W/  INTRAOCULAR LENS IMPLANT Right 10/31/2018        CATARACT EXTRACTION W/  INTRAOCULAR LENS IMPLANT Left 03/22/2018        CHOLECYSTECTOMY      COLONOSCOPY N/A 11/16/2016    Procedure: COLONOSCOPY;  Surgeon: Chris Storey MD;  Location: [facility] (4TH FLR);  Service: Endoscopy;  Laterality: N/A;    COLONOSCOPY N/A 6/14/2017    Procedure: COLONOSCOPY;  Surgeon: Chris Storey MD;  Location: [facility] (4TH FLR);  Service: Endoscopy;  Laterality: N/A;  colonoscopy in 3 months with better bowel prep. - Split PEG prep ordered    CYSTOSCOPY  4/19/2023    Procedure: CYSTOSCOPY;  Surgeon: Domo Bates MD;  Location: [facility] OR [address] [place] [address];  Service: Urology;;    CYSTOSCOPY N/A 5/12/2023    Procedure: CYSTOSCOPY;  Surgeon: Laz Rudolph Jr., MD;  Location: [facility] OR [address] [place] [address];  Service: Urology;  Laterality: N/A;    ESOPHAGOGASTRODUODENOSCOPY N/A 11/10/2021    Procedure: EGD (ESOPHAGOGASTRODUODENOSCOPY);  Surgeon: Kuldeep Velázquez MD;  [facility]  Service: Endoscopy;  Laterality: N/A;  11/4-eliquis hold ok see te -tb-fully vacc-inst mail and verbal-    EXTRACORPOREAL SHOCK WAVE LITHOTRIPSY      EXTRACTION - STONE Right 5/12/2023    Procedure: EXTRACTION - STONE;  Surgeon: Laz Rudolph Jr., MD;  Location: [facility] OR [address] [place] [address];  Service: Urology;  Laterality: Right;    INJECTION OF ANESTHETIC AGENT AROUND NERVE Bilateral 6/24/2020    Procedure: BLOCK, NERVE, C4-C5-C6 MEDIAL BRANCH ok per Talia to add on;  Surgeon: Oscar Ravi MD;  Location: [facility] PAIN MGT;  Service: Pain Management;  Laterality: Bilateral;    INTRAOCULAR PROSTHESES INSERTION Right 10/31/2018    Procedure: INSERTION-INTRAOCULAR LENS (IOL);  Surgeon: Keysha Valle MD;  Location: [facility] OR;  Service: Ophthalmology;  Laterality: Right;     JOINT REPLACEMENT      LASER LITHOTRIPSY Right 5/12/2023    Procedure: LITHOTRIPSY, USING LASER;  Surgeon: Laz Rudolph Jr., MD;  [facility];  Service: Urology;  Laterality: Right;    mastoid tumor removal  1969    Left mastoidectomy with residual left facial weakness.    NEPHROSTOMY      OOPHORECTOMY      PHACOEMULSIFICATION OF CATARACT Right 10/31/2018    Procedure: PHACOEMULSIFICATION-ASPIRATION-CATARACT;  Surgeon: Keysha Valle MD;  Location: [facility] OR;  Service: Ophthalmology;  Laterality: Right;    PYELOSCOPY Right 5/12/2023    Procedure: PYELOSCOPY;  Surgeon: Laz Rudolph Jr., MD;  [facility];  Service: Urology;  Laterality: Right;    REMOVAL, STENT, URETER Right 5/12/2023    Procedure: REMOVAL, STENT, URETER;  Surgeon: Laz Rudolph Jr., MD;  Location: [facility] OR [facility] FLR;  Service: Urology;  Laterality: Right;    RETROGRADE PYELOGRAPHY Right 4/19/2023    Procedure: PYELOGRAM, RETROGRADE;  Surgeon: Domo Bates MD;  [facility];  Service: Urology;  Laterality: Right;    TOTAL ABDOMINAL HYSTERECTOMY  1969    TAHBSO    TOTAL KNEE ARTHROPLASTY  9/13/2012    Left    TRANSFORAMINAL EPIDURAL INJECTION OF STEROID Bilateral 9/17/2019    Procedure: INJECTION, STEROID, EPIDURAL, TRANSFORAMINAL APPROACH;  Surgeon: Oscar Ravi MD;  Location: [facility] PAIN MGT;  Service: Pain Management;  Laterality: Bilateral;  B/L TFESI L4/L5    TRANSFORAMINAL EPIDURAL INJECTION OF STEROID Bilateral 10/2/2020    Procedure: INJECTION, STEROID, EPIDURAL, TRANSFORAMINAL APPROACH;  Surgeon: Oscar Ravi MD;  Location: [facility] PAIN MGT;  Service: Pain Management;  Laterality: Bilateral;  B/L TFESI L4/5    TRANSFORAMINAL EPIDURAL INJECTION OF STEROID Bilateral 5/7/2021    Procedure: INJECTION, STEROID, EPIDURAL, TRANSFORAMINAL APPROACH, L4-L5 clear to hold Eliquis 3 days;  Surgeon: Oscar Ravi MD;  Location: [facility] PAIN MGT;  Service: Pain Management;  Laterality:  Bilateral;    URETERAL STENT PLACEMENT Right 2023    Procedure: INSERTION, STENT, URETER;  Surgeon: Domo Bates MD;  Location: [facility] OR [address] [place] [address];  Service: Urology;  Laterality: Right;    URETERAL STENT PLACEMENT N/A 2023    Procedure: INSERTION, STENT, URETER;  Surgeon: Laz Rudolph Jr., MD;  Location: [facility] OR [address] [place] [address];  Service: Urology;  Laterality: N/A;    URETEROSCOPY Right 2023    Procedure: URETEROSCOPY;  Surgeon: Laz Rudolph Jr., MD;  Location: [facility] OR [address] [place] [address];  Service: Urology;  Laterality: Right;       Social History     Socioeconomic History    Marital status: Single    Number of children: 5   Tobacco Use    Smoking status: Former     Packs/day: 1.00     Years: 15.00     Pack years: 15.00     Types: Cigarettes     Quit date: 2000     Years since quittin.9    Smokeless tobacco: Never   Substance and Sexual Activity    Alcohol use: No    Drug use: No    Sexual activity: Never   Social History Narrative    Single with 5 children. Lives alone. Retired cook.     Social Determinants of Health     Financial Resource Strain: Medium Risk    Difficulty of Paying Living Expenses: Somewhat hard   Food Insecurity: No Food Insecurity    Worried About Running Out of Food in the Last Year: Never true    Ran Out of Food in the Last Year: Never true   Transportation Needs: No Transportation Needs    Lack of Transportation (Medical): No    Lack of Transportation (Non-Medical): No   Physical Activity: Insufficiently Active    Days of Exercise per Week: 5 days    Minutes of Exercise per Session: 10 min   Stress: No Stress Concern Present    Feeling of Stress : Only a little   Social Connections: Unknown    Frequency of Communication with Friends and Family: Never    Frequency of Social Gatherings with Friends and Family: Never    Attends [employer] Services: Never    Active Member of Clubs or Organizations: No    Attends Club or Organization Meetings: Never   Housing  Stability: Low Risk     Unable to Pay for Housing in the Last Year: No    Number of Places Lived in the Last Year: 1    Unstable Housing in the Last Year: No       OBJECTIVE:     Vital Signs Range (Last 24H):         Significant Labs:  Lab Results   Component Value Date    WBC 16.85 (H) 05/13/2023    HGB 9.7 (L) 05/13/2023    HCT 29.4 (L) 05/13/2023     05/13/2023    CHOL 169 05/19/2021    TRIG 82 05/19/2021    HDL 45 05/19/2021    ALT 10 04/26/2023    AST 18 04/26/2023     05/13/2023    K 4.3 05/13/2023     05/13/2023    CREATININE 1.3 05/13/2023    BUN 18 05/13/2023    CO2 20 (L) 05/13/2023    TSH 1.433 04/18/2023    INR 1.1 08/16/2022    HGBA1C 8.2 (H) 03/08/2023       Diagnostic Studies: No relevant studies.    EKG:   Results for orders placed or performed during the hospital encounter of 04/18/23   EKG 12-lead    Collection Time: 04/18/23 10:35 PM    Narrative    Test Reason : R06.02,    Vent. Rate : 070 BPM     Atrial Rate : 070 BPM     P-R Int : 120 ms          QRS Dur : 124 ms      QT Int : 456 ms       P-R-T Axes : 057 -35 091 degrees     QTc Int : 492 ms    Normal sinus rhythm  Left axis deviation  Nonspecific intraventricular conduction delay  Possible IMI vs due to IVCD  Nonspecific ST and T wave abnormality  Abnormal ECG  When compared with ECG of 16-AUG-2022 21:57,  No significant change was found  Reconfirmed by King CABEZAS MD ([id number]) on 4/19/2023 8:29:14 AM    Referred By: [email or web address]   SELF           Confirmed By:King CABEZAS MD       2D ECHO:  TTE:  Results for orders placed or performed during the hospital encounter of 01/25/22   Echo   Result Value Ref Range    BSA 2.23 m2    TDI SEPTAL 0.03 m/s    LV LATERAL E/E' RATIO 10.50 m/s    LV SEPTAL E/E' RATIO 21.00 m/s    LA WIDTH 5.06 cm    TDI LATERAL 0.06 m/s    LVIDd 4.34 3.5 - 6.0 cm    IVS 1.29 (A) 0.6 - 1.1 cm    Posterior Wall 1.48 (A) 0.6 - 1.1 cm    LVIDs 3.03 2.1 - 4.0 cm    FS 30 28 - 44 %    LA volume 105.03 cm3    Sinus  "3.58 cm    STJ 3.34 cm    Ascending aorta 3.49 cm    LV mass 231.63 g    LA size 4.60 cm    RVDD 3.36 cm    TAPSE 2.45 cm    RV S' 10.26 cm/s    Left Ventricle Relative Wall Thickness 0.68 cm    AV mean gradient 9 mmHg    AV valve area 2.46 cm2    AV Velocity Ratio 0.51     AV index (prosthetic) 0.59     MV valve area p 1/2 method 2.17 cm2    E/A ratio 0.61     Mean e' 0.05 m/s    E wave deceleration time 350.31 msec    IVRT 119.89 msec    MV "A" wave duration 18.55 msec    Pulm vein S/D ratio 1.79     LVOT diameter 2.31 cm    LVOT area 4.2 cm2    LVOT peak micah 1.06 m/s    LVOT peak VTI 24.32 cm    Ao peak micah 2.06 m/s    Ao VTI 41.48 cm    LVOT stroke volume 101.87 cm3    AV peak gradient 17 mmHg    E/E' ratio 14.00 m/s    MV Peak E Micah 0.63 m/s    TR Max Micah 2.59 m/s    MV stenosis pressure 1/2 time 101.59 ms    MV Peak A Micah 1.04 m/s    PV Peak S Micah 0.43 m/s    PV Peak D Micah 0.24 m/s    LV Systolic Volume 35.73 mL    LV Systolic Volume Index 16.5 mL/m2    LV Diastolic Volume 85.08 mL    LV Diastolic Volume Index 39.39 mL/m2    LA Volume Index 48.6 mL/m2    LV Mass Index 107 g/m2    RA Major Axis 4.19 cm    Left Atrium Minor Axis 5.23 cm    Left Atrium Major Axis 5.39 cm    Triscuspid Valve Regurgitation Peak Gradient 27 mmHg    LA Volume Index (Mod) 44.1 mL/m2    LA volume (mod) 95.31 cm3    RA Width 2.97 cm    Right Atrial Pressure (from IVC) 8 mmHg    EF 55 %    TV rest pulmonary artery pressure 35 mmHg    Narrative    · The estimated ejection fraction is 55%.  · The left ventricle is normal in size with concentric hypertrophy and   normal systolic function.  · Grade I left ventricular diastolic dysfunction.  · Normal right ventricular size with normal right ventricular systolic   function.  · Moderate left atrial enlargement.  · There is right ventricular hypertrophy.  · Mild tricuspid regurgitation.  · The estimated PA systolic pressure is 35 mmHg.  · Intermediate central venous pressure (8 mmHg).    "       EVERARDO:  No results found. However, due to the size of the patient record, not all encounters were searched. Please check Results Review for a complete set of results.    ASSESSMENT/PLAN:                                                                                                                07/07/2023  Henrietta Villasenor is a 83 y.o., female.      Pre-op Assessment    I have reviewed the Patient Summary Reports.     I have reviewed the Nursing Notes. I have reviewed the NPO Status.   I have reviewed the Medications.     Review of Systems  Anesthesia Hx:  Denies Family Hx of Anesthesia complications.   Denies Personal Hx of Anesthesia complications.   Social:  Non-Smoker    Hematology/Oncology:  Hematology Normal   Oncology Normal     EENT/Dental:EENT/Dental Normal   Cardiovascular:   Hypertension Past MI CAD   Angina CHF    Pulmonary:   Sleep Apnea, CPAP    Renal/:   Chronic Renal Disease, CKD    Hepatic/GI:   PUD, Hiatal Hernia, GERD    Musculoskeletal:  Musculoskeletal Normal    Neurological:   Headaches    Endocrine:  Endocrine Normal    Dermatological:  Skin Normal    Psych:   depression          Physical Exam  General: Well nourished, Cooperative, Alert and Oriented    Airway:  Mallampati: III / II  Mouth Opening: Normal  TM Distance: Normal  Tongue: Normal  Neck ROM: Normal ROM    Dental:  Periodontal disease    Chest/Lungs:  Normal Respiratory Rate    Heart:  Rate: Normal    Abdomen:  Normal        Anesthesia Plan  Type of Anesthesia, risks & benefits discussed:    Anesthesia Type: Gen ETT  Intra-op Monitoring Plan: Standard ASA Monitors  Post Op Pain Control Plan: multimodal analgesia  Induction:  IV  Airway Plan: Direct, Post-Induction  Informed Consent: Informed consent signed with the Patient and all parties understand the risks and agree with anesthesia plan.  All questions answered.   ASA Score: 3  Day of Surgery Review of History & Physical: H&P Update referred to the surgeon/provider.    Ready  For Surgery From Anesthesia Perspective.     .

## 2023-07-07 NOTE — ANESTHESIA PROCEDURE NOTES
Intubation    Date/Time: 7/7/2023 11:26 AM  Performed by: Christin Pacheco CRNA  Authorized by: Chava Gonzalez MD     Intubation:     Induction:  Intravenous    Intubated:  Postinduction    Mask Ventilation:  Moderately difficult with oral airway    Attempts:  1    Attempted By:  CRNA    Method of Intubation:  Video laryngoscopy    Blade:  Avina 3    Laryngeal View Grade: Grade I - full view of cords      Difficult Airway Encountered?: No      Complications:  None    Airway Device:  Oral endotracheal tube    Airway Device Size:  7.0    Style/Cuff Inflation:  Cuffed (inflated to minimal occlusive pressure)    Inflation Amount (mL):  7    Tube secured:  21    Secured at:  The lips    Placement Verified By:  Capnometry    Complicating Factors:  Obesity    Findings Post-Intubation:  BS equal bilateral and atraumatic/condition of teeth unchanged

## 2023-07-07 NOTE — OP NOTE
[facility]  Operative Note    Date: 07/07/2023    Pre-Op Diagnosis: right renal stone    Patient Active Problem List    Diagnosis Date Noted    History of DVT (deep vein thrombosis) 04/26/2023    Hypoglycemia associated with diabetes 04/26/2023    Hypomagnesemia 04/26/2023    Hypokalemia 04/26/2023    HANNAH (acute kidney injury) 04/19/2023    Hydronephrosis with urinary obstruction due to ureteral calculus 04/19/2023    At risk for falls 12/27/2022    Left leg pain 04/07/2022    Chronic bilateral low back pain with sciatica 04/07/2022    Chronic pain disorder 12/07/2021    Decreased activities of daily living (ADL) 12/07/2021    Impaired functional mobility, balance, gait, and endurance 12/07/2021    Venous insufficiency of both lower extremities 04/06/2021    Memory loss 02/10/2021    Deep vein thrombosis (DVT) of popliteal vein of both lower extremities 02/04/2021    PAD (peripheral artery disease) 02/04/2021    Hematoma of arm, left, initial encounter 12/29/2020    Bilateral leg edema 12/05/2020    Neuropathic pain 12/05/2020    Thyroid nodule 07/16/2020    Osteopenia 07/07/2020    Postmenopausal 07/02/2020    Chest pain 01/09/2020    Decreased ROM of lumbar spine 12/10/2019    Weakness 12/10/2019    Abnormality of gait and mobility 12/10/2019    Decreased functional activity tolerance 12/10/2019    Chronic pain 09/17/2019    Vitreomacular adhesion, right 06/04/2019    Epiretinal membrane, left 06/04/2019    Gout, arthritis 05/10/2019    Type 2 diabetes mellitus with hyperglycemia 11/29/2018    Hypercholesterolemia 11/29/2018    Hypovitaminosis D 11/29/2018    Research study patient - TACT2 EDTA Chelation Study 11/29/2018    History of MI (myocardial infarction) 11/29/2018    Facial nerve palsy, secondary 11/29/2018    Mood disorder 08/15/2018    Tortuous aorta 06/05/2018    Ectatic aorta 06/05/2018    Insulin long-term use 06/05/2018    Facet arthropathy 06/05/2018    Benign paroxysmal  positional vertigo due to bilateral vestibular disorder 05/18/2018    Senile nuclear sclerosis 03/22/2018    Spinal stenosis of lumbar region with neurogenic claudication 02/23/2018    Type 2 diabetes mellitus with diabetic polyneuropathy, with long-term current use of insulin 08/20/2017    Obesity, diabetes, and hypertension syndrome 08/20/2017    Hyperparathyroidism, unspecified     History of colon polyps 06/14/2017    Low hemoglobin and low hematocrit 11/16/2016    Type 2 diabetes mellitus with stage 3 chronic kidney disease, with long-term current use of insulin 08/31/2016    Obesity (BMI 30.0-34.9) 08/31/2016    Diabetic neuropathy associated with type 2 diabetes mellitus 08/26/2016    Shortness of breath     Type 2 diabetes mellitus, with long-term current use of insulin 05/11/2016    Painful swallowing 02/17/2016    Left facial numbness     Neck pain on left side     Esophageal dysphagia 09/15/2015    Fatty liver 09/15/2015    Diverticulitis of large intestine without perforation or abscess without bleeding 07/27/2015    LLQ abdominal pain 07/24/2015    Hyperlipidemia 06/19/2015    Aortic atherosclerosis 06/19/2015    Spinal enthesopathy of lumbar region 06/19/2015    Allergic rhinitis 05/05/2015    Lactose intolerance 06/03/2014    Stage 3a chronic kidney disease 05/14/2014    Coronary artery disease involving native coronary artery of native heart with angina pectoris 05/14/2014    Hearing loss, sensorineural 03/20/2014    Chronic right ear pain 03/07/2014    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 03/07/2014    Anemia 11/07/2013    Knee pain 04/09/2013    Diastolic congestive heart failure 09/12/2012    Gastroesophageal reflux disease without esophagitis 07/29/2012    Hydradenitis 07/29/2012    Migraines, neuralgic 07/29/2012    Chronic back pain 07/29/2012    Essential hypertension     MARLON on CPAP     Nephrolithiasis     Personal history of allergy to sulfonamides 09/20/2011    Abdominal pain, unspecified site 07/26/2011     Dermatitis due to drugs and medicines taken internally(693.0) 05/25/2011    Ureteral calculus 02/22/2011    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 Right ureteroscopy with laser lithotripsy  2. Cystoscopy  3. pyeloscopy  4. Right ureteral stent exchange  5. Fluoro < 1 hr    Specimen(s): none    Staff Surgeon: Laz Rudolph MD    Assistant Surgeon: MD Luis Rebollar,     Anesthesia: General endotracheal anesthesia    Indications: Henrietta Villasenor is a 83 y.o. female with a right renal stone s/p prior URS presenting for definitive stone management. She is pre-stented.    Findings:  -Small right stone debris in right kidney from previous URS. Stone debris dusted with popcorn laser setting. All fragments small enough to pass on their own  -6x26 right stent with strings to be pulled Monday 7/10/23    Estimated Blood Loss: min    Drains:   1. Right 6 Fr x 26 cm JJ ureteral stent with strings    Procedure in detail:  After risks, benefits, and possible complications were explained, the patient elected to undergo the procedure and informed consent was obtained. All questions were answered in the ольга-operative area. The patient was transferred to the cystoscopy suite and placed in the supine position. SCDs were applied and working. Anesthesia was administered. The patient was then placed in the dorsal lithotomy position and prepped and draped in the usual sterile fashion. Time out was performed, and ольга-procedural antibiotics were confirmed.     A rigid cystoscope in a 22 Fr sheath was introduced into the patient's urethra. This passed easily. The entire urethra was visualized which showed no strictures or masses. Cystoscopy revealed the ureteral orifices in the normal  anatomic location bilaterally.    A motion wire placed through the right UO up to the expected level of the renal pelvis. The patient's right ureteral stent was grasped with alligator graspers and brought to the meatus and removed in its entierty. A stiff glide wire was passed into the right UO up to the expected renal pelvis.    A 10/12 Fr 35 cm ureteral access sheath was advanced over the stiff glide wire to the level of the renal pelvis under continuous fluoroscopy. This passed easily. The inner dilator and stiff glide wire were removed keeping the outer sheath in place. An 8 Fr flexible ureteroscope was advanced through the access sheath and into the kidney.    Stone fragments were encountered in lower pole of the right kidney. The laser fiber was inserted per the scope and the stones were dusted. Systematic pyeloscopy was performed and no additional stone fragments were visualized all remaining stone fragments were deemed small enough to pass spontaneously, <1 mm. The scope and ureteral access sheath were removed keeping the motion wire in place.      A 6 Fr x 26 cm JJ ureteral stent with strings was passed over the wire and up into the renal pelvis using fluoro. When the coil appeared to be in good position in the kidney the wire was removed under continuous fluoro. Good coils were seen in the kidney and the bladder using fluoro.    The patient tolerated the procedure well and was transferred to the recovery room in stable condition.    Disposition:  The patient will be discharged home from PACU. She follow up with Dr. Rudolph  in 1 month with a renal ultrasound prior. She was given written instructions to self-remove her ureteral stent with strings on Monday, 7/10/2023.    Martir Patton MD

## 2023-07-07 NOTE — PATIENT INSTRUCTIONS
Post Cystoscopy Instructions  Do not strain to have a bowel movement  No strenuous exercise x 7 days  No driving while you are on narcotic pain medications or if your rachel  catheter is in place    You can expect:  To pass stone fragments if you had a stone procedure  Have pain when you void from your stent if you have a stent in place  See blood in your urine if you have a stent in place    If you have a catheter, please return to the ER if your catheter stops draining or you are having abdominal pain.    Call the doctor if:  Temperature is greater than 101F  Persistent vomiting and inability to keep food down  Inability to void if you do not have a catheter

## 2023-07-12 ENCOUNTER — OFFICE VISIT (OUTPATIENT)
Dept: OPTOMETRY | Facility: CLINIC | Age: 83
End: 2023-07-12
Payer: COMMERCIAL

## 2023-07-12 DIAGNOSIS — H43.821 VITREOMACULAR ADHESION, RIGHT: ICD-10-CM

## 2023-07-12 DIAGNOSIS — Z96.1 PSEUDOPHAKIA OF BOTH EYES: ICD-10-CM

## 2023-07-12 DIAGNOSIS — H26.493 PCO (POSTERIOR CAPSULAR OPACIFICATION), BILATERAL: ICD-10-CM

## 2023-07-12 DIAGNOSIS — E11.42 TYPE 2 DIABETES MELLITUS WITH DIABETIC POLYNEUROPATHY, WITH LONG-TERM CURRENT USE OF INSULIN: ICD-10-CM

## 2023-07-12 DIAGNOSIS — H52.4 PRESBYOPIA: Primary | ICD-10-CM

## 2023-07-12 DIAGNOSIS — E11.65 TYPE 2 DIABETES MELLITUS WITH HYPERGLYCEMIA, WITHOUT LONG-TERM CURRENT USE OF INSULIN: ICD-10-CM

## 2023-07-12 DIAGNOSIS — H04.123 DRY EYE SYNDROME, BILATERAL: ICD-10-CM

## 2023-07-12 DIAGNOSIS — G47.33 OSA ON CPAP: Chronic | ICD-10-CM

## 2023-07-12 DIAGNOSIS — H10.13 CONJUNCTIVITIS, ALLERGIC, BILATERAL: ICD-10-CM

## 2023-07-12 DIAGNOSIS — Z79.4 TYPE 2 DIABETES MELLITUS WITH DIABETIC POLYNEUROPATHY, WITH LONG-TERM CURRENT USE OF INSULIN: ICD-10-CM

## 2023-07-12 DIAGNOSIS — H35.372 EPIRETINAL MEMBRANE, LEFT: ICD-10-CM

## 2023-07-12 PROCEDURE — 99999 PR PBB SHADOW E&M-EST. PATIENT-LVL IV: ICD-10-PCS | Mod: PBBFAC,,, | Performed by: OPTOMETRIST

## 2023-07-12 PROCEDURE — 92250 FUNDUS PHOTOGRAPHY W/I&R: CPT | Mod: S$GLB,,, | Performed by: OPTOMETRIST

## 2023-07-12 PROCEDURE — 92250 COLOR FUNDUS PHOTOGRAPHY - OU - BOTH EYES: ICD-10-PCS | Mod: S$GLB,,, | Performed by: OPTOMETRIST

## 2023-07-12 PROCEDURE — 92015 DETERMINE REFRACTIVE STATE: CPT | Mod: S$GLB,,, | Performed by: OPTOMETRIST

## 2023-07-12 PROCEDURE — 92004 COMPRE OPH EXAM NEW PT 1/>: CPT | Mod: S$GLB,,, | Performed by: OPTOMETRIST

## 2023-07-12 PROCEDURE — 92004 PR EYE EXAM, NEW PATIENT,COMPREHESV: ICD-10-PCS | Mod: S$GLB,,, | Performed by: OPTOMETRIST

## 2023-07-12 PROCEDURE — 99999 PR PBB SHADOW E&M-EST. PATIENT-LVL IV: CPT | Mod: PBBFAC,,, | Performed by: OPTOMETRIST

## 2023-07-12 PROCEDURE — 92015 PR REFRACTION: ICD-10-PCS | Mod: S$GLB,,, | Performed by: OPTOMETRIST

## 2023-07-13 PROBLEM — H25.10 SENILE NUCLEAR SCLEROSIS: Status: RESOLVED | Noted: 2018-03-22 | Resolved: 2023-07-13

## 2023-07-13 NOTE — PROGRESS NOTES
HPI    Pt states her va has been blurry in OU for the distance and near- for   about 1 year now. OS seems worse than OD.  No flashes. No floaters.    Pataday QD OU    1. VMT OD  Increasing traction, but ASx  Monitor for now     2. Small ERM OS  ASx     3. PCIOL OU     4. KCS  Last edited by Abdulaziz Rosenberg on 7/12/2023 11:16 AM.            Assessment /Plan     For exam results, see Encounter Report.    Presbyopia   Ok to continue with readers    Type 2 diabetes mellitus with diabetic polyneuropathy, with long-term current use of insulin  Type 2 diabetes mellitus with hyperglycemia, without long-term current use of insulin    -  TODAY   Color Fundus Photography - OU - Both Eyes    Vitreomacular adhesion, right  Epiretinal membrane, left   Last visit with Rocio in 2020 2018, 2019 and 2020 OCT mac    MARLON on CPAP    Conjunctivitis, allergic, bilateral   Pataday QAM OU    Dry eye syndrome, bilateral   Ivizia QHS OU    Pseudophakia of both eyes  PCO (posterior capsular opacification), bilateral   Consult for YAG OU         RTC 1 year/ DFE/ OCT mac

## 2023-07-24 PROBLEM — N17.9 AKI (ACUTE KIDNEY INJURY): Status: RESOLVED | Noted: 2023-04-19 | Resolved: 2023-07-24

## 2023-07-24 NOTE — ANESTHESIA POSTPROCEDURE EVALUATION
Anesthesia Post Evaluation    Patient: Henrietta Villasenor    Procedure(s) Performed: Procedure(s) (LRB):  CYSTOSCOPY (N/A)  URETEROSCOPY (Right)  PYELOSCOPY (Right)  LITHOTRIPSY, USING LASER (Right)  REMOVAL, STENT, URETER (Right)  INSERTION, STENT, URETER (Right)    Final Anesthesia Type: general      Patient location during evaluation: PACU  Patient participation: Yes- Able to Participate  Level of consciousness: awake and alert  Post-procedure vital signs: reviewed and stable  Pain management: adequate  Airway patency: patent    PONV status at discharge: No PONV  Anesthetic complications: no      Cardiovascular status: blood pressure returned to baseline  Respiratory status: unassisted  Hydration status: euvolemic  Follow-up not needed.          Vitals Value Taken Time   /70 07/07/23 1300   Temp 36.2 °C (97.2 °F) 07/07/23 1300   Pulse 72 07/07/23 1300   Resp 26 07/07/23 1300   SpO2 94 % 07/07/23 1300         No case tracking events are documented in the log.      Pain/Annette Score: No data recorded

## 2023-07-31 ENCOUNTER — TELEPHONE (OUTPATIENT)
Dept: DIABETES | Facility: CLINIC | Age: 83
End: 2023-07-31
Payer: MEDICARE

## 2023-08-07 ENCOUNTER — HOSPITAL ENCOUNTER (OUTPATIENT)
Dept: RADIOLOGY | Facility: HOSPITAL | Age: 83
Discharge: HOME | End: 2023-08-07
Attending: UROLOGY
Payer: MEDICARE

## 2023-08-07 DIAGNOSIS — N20.1 URETERAL CALCULUS: ICD-10-CM

## 2023-08-07 PROCEDURE — 76770 US EXAM ABDO BACK WALL COMP: CPT | Mod: 26,HCNC,, | Performed by: RADIOLOGY

## 2023-08-07 PROCEDURE — 76770 US RETROPERITONEAL COMPLETE: ICD-10-PCS | Mod: 26,HCNC,, | Performed by: RADIOLOGY

## 2023-08-07 PROCEDURE — 76770 US EXAM ABDO BACK WALL COMP: CPT | Mod: TC,HCNC

## 2023-08-07 RX ORDER — OXYCODONE AND ACETAMINOPHEN 10; 325 MG/1; MG/1
1 TABLET ORAL
Qty: 150 TABLET | Refills: 0 | Status: SHIPPED | OUTPATIENT
Start: 2023-08-07 | End: 2023-09-07 | Stop reason: SDUPTHER

## 2023-08-07 NOTE — TELEPHONE ENCOUNTER
Care Due:                  Date            Visit Type   Department     Provider  --------------------------------------------------------------------------------                                EP -                              PRIMARY      [facility] INTERNAL  Last Visit: 07-      CARE ([facility])   MEDICINE       FEDERICO MARCIAL                              [facility]                              PRIMARY      [facility] INTERNAL  Next Visit: 10-      CARE ([facility])   MEDICINE       FEDERICO MARCIAL                                                            Last  Test          Frequency    Reason                     Performed    Due Date  --------------------------------------------------------------------------------    HBA1C.......  6 months...  dulaglutide, insulin.....  03- 09-    Lipid Panel.  12 months..  atorvastatin.............  Not Found    Overdue    [employer] Embedded Care Due Messages. Reference number: [id number].   8/07/2023 11:03:42 AM CDT

## 2023-08-08 ENCOUNTER — OFFICE VISIT (OUTPATIENT)
Dept: UROLOGY | Facility: CLINIC | Age: 83
End: 2023-08-08
Payer: MEDICARE

## 2023-08-08 VITALS
WEIGHT: 202.81 LBS | HEART RATE: 79 BPM | BODY MASS INDEX: 30.74 KG/M2 | DIASTOLIC BLOOD PRESSURE: 68 MMHG | HEIGHT: 68 IN | SYSTOLIC BLOOD PRESSURE: 114 MMHG

## 2023-08-08 DIAGNOSIS — N20.0 NEPHROLITHIASIS: Primary | ICD-10-CM

## 2023-08-08 PROCEDURE — 1159F PR MEDICATION LIST DOCUMENTED IN MEDICAL RECORD: ICD-10-PCS | Mod: HCNC,CPTII,S$GLB, | Performed by: UROLOGY

## 2023-08-08 PROCEDURE — 3288F FALL RISK ASSESSMENT DOCD: CPT | Mod: HCNC,CPTII,S$GLB, | Performed by: UROLOGY

## 2023-08-08 PROCEDURE — 1126F PR PAIN SEVERITY QUANTIFIED, NO PAIN PRESENT: ICD-10-PCS | Mod: HCNC,CPTII,S$GLB, | Performed by: UROLOGY

## 2023-08-08 PROCEDURE — 1101F PT FALLS ASSESS-DOCD LE1/YR: CPT | Mod: HCNC,CPTII,S$GLB, | Performed by: UROLOGY

## 2023-08-08 PROCEDURE — 1159F MED LIST DOCD IN RCRD: CPT | Mod: HCNC,CPTII,S$GLB, | Performed by: UROLOGY

## 2023-08-08 PROCEDURE — 3078F DIAST BP <80 MM HG: CPT | Mod: HCNC,CPTII,S$GLB, | Performed by: UROLOGY

## 2023-08-08 PROCEDURE — 99024 PR POST-OP FOLLOW-UP VISIT: ICD-10-PCS | Mod: HCNC,S$GLB,, | Performed by: UROLOGY

## 2023-08-08 PROCEDURE — 1101F PR PT FALLS ASSESS DOC 0-1 FALLS W/OUT INJ PAST YR: ICD-10-PCS | Mod: HCNC,CPTII,S$GLB, | Performed by: UROLOGY

## 2023-08-08 PROCEDURE — 3288F PR FALLS RISK ASSESSMENT DOCUMENTED: ICD-10-PCS | Mod: HCNC,CPTII,S$GLB, | Performed by: UROLOGY

## 2023-08-08 PROCEDURE — 99024 POSTOP FOLLOW-UP VISIT: CPT | Mod: HCNC,S$GLB,, | Performed by: UROLOGY

## 2023-08-08 PROCEDURE — 3078F PR MOST RECENT DIASTOLIC BLOOD PRESSURE < 80 MM HG: ICD-10-PCS | Mod: HCNC,CPTII,S$GLB, | Performed by: UROLOGY

## 2023-08-08 PROCEDURE — 99999 PR PBB SHADOW E&M-EST. PATIENT-LVL IV: CPT | Mod: PBBFAC,HCNC,, | Performed by: UROLOGY

## 2023-08-08 PROCEDURE — 3074F PR MOST RECENT SYSTOLIC BLOOD PRESSURE < 130 MM HG: ICD-10-PCS | Mod: HCNC,CPTII,S$GLB, | Performed by: UROLOGY

## 2023-08-08 PROCEDURE — 1160F PR REVIEW ALL MEDS BY PRESCRIBER/CLIN PHARMACIST DOCUMENTED: ICD-10-PCS | Mod: HCNC,CPTII,S$GLB, | Performed by: UROLOGY

## 2023-08-08 PROCEDURE — 1126F AMNT PAIN NOTED NONE PRSNT: CPT | Mod: HCNC,CPTII,S$GLB, | Performed by: UROLOGY

## 2023-08-08 PROCEDURE — 3074F SYST BP LT 130 MM HG: CPT | Mod: HCNC,CPTII,S$GLB, | Performed by: UROLOGY

## 2023-08-08 PROCEDURE — 99999 PR PBB SHADOW E&M-EST. PATIENT-LVL IV: ICD-10-PCS | Mod: PBBFAC,HCNC,, | Performed by: UROLOGY

## 2023-08-08 PROCEDURE — 1160F RVW MEDS BY RX/DR IN RCRD: CPT | Mod: HCNC,CPTII,S$GLB, | Performed by: UROLOGY

## 2023-08-08 NOTE — PROGRESS NOTES
"[facility] [address]   Clinic Note    SUBJECTIVE:     Chief Complaint: Post op right kidney stones    History of Present Illness:  Henrietta Villasenor is a 83 y.o. female who presents to clinic for post op evaluation. She underwent right URS and pyeloscopy, LL for right lower pole stone on 7/7. She removed her stent on 7/10 without complication.     Renal u/s 8/8/23 without hydronephrosis bilaterally. Reports some intermittent right flank pain. She denies, fevers, chills, N/V.       OBJECTIVE:     Estimated body mass index is 30.84 kg/m² as calculated from the following:    Height as of this encounter: 5' 8" (1.727 m).    Weight as of this encounter: 92 kg (202 lb 13.2 oz).    Vital Signs (Most Recent)  Pulse: 79 (08/08/23 1005)  BP: 114/68 (08/08/23 1005)    Physical Exam  Constitutional:       General: She is not in acute distress.  HENT:      Head: Normocephalic.   Cardiovascular:      Rate and Rhythm: Normal rate.   Pulmonary:      Effort: Pulmonary effort is normal.   Abdominal:      General: There is no distension.      Palpations: Abdomen is soft.      Tenderness: There is no abdominal tenderness. There is no right CVA tenderness or left CVA tenderness.      Comments: Right lower back pain   Musculoskeletal:         General: Normal range of motion.   Skin:     General: Skin is warm and dry.   Neurological:      Mental Status: She is alert.      Coordination: Coordination normal.   Psychiatric:         Behavior: Behavior normal.         Lab Results   Component Value Date    BUN 18 05/13/2023    CREATININE 1.3 05/13/2023    WBC 16.85 (H) 05/13/2023    HGB 9.7 (L) 05/13/2023    HCT 29.4 (L) 05/13/2023     05/13/2023    AST 18 04/26/2023    ALT 10 04/26/2023    ALKPHOS 89 04/26/2023    ALBUMIN 3.0 (L) 04/26/2023    HGBA1C 8.2 (H) 03/08/2023        Urine dip showed no blood, leukocyte esterase, and nitrite. Negative protein.     ASSESSMENT     1. Nephrolithiasis      PLAN:   1. Nephrolithiasis     "     ERICA in 1 yr and return to clinic.     SIMRAN BEJARANO MD

## 2023-08-18 ENCOUNTER — OFFICE VISIT (OUTPATIENT)
Dept: OPHTHALMOLOGY | Facility: CLINIC | Age: 83
End: 2023-08-18
Payer: MEDICARE

## 2023-08-18 DIAGNOSIS — H26.492 POSTERIOR CAPSULAR OPACIFICATION VISUALLY SIGNIFICANT, LEFT EYE: ICD-10-CM

## 2023-08-18 DIAGNOSIS — H26.491 POSTERIOR CAPSULAR OPACIFICATION VISUALLY SIGNIFICANT, RIGHT EYE: Primary | ICD-10-CM

## 2023-08-18 PROCEDURE — 1159F PR MEDICATION LIST DOCUMENTED IN MEDICAL RECORD: ICD-10-PCS | Mod: HCNC,CPTII,S$GLB, | Performed by: OPHTHALMOLOGY

## 2023-08-18 PROCEDURE — 1100F PR PT FALLS ASSESS DOC 2+ FALLS/FALL W/INJURY/YR: ICD-10-PCS | Mod: HCNC,CPTII,S$GLB, | Performed by: OPHTHALMOLOGY

## 2023-08-18 PROCEDURE — 2023F PR DILATED RETINAL EXAM W/O EVID OF RETINOPATHY: ICD-10-PCS | Mod: HCNC,CPTII,S$GLB, | Performed by: OPHTHALMOLOGY

## 2023-08-18 PROCEDURE — 1159F MED LIST DOCD IN RCRD: CPT | Mod: HCNC,CPTII,S$GLB, | Performed by: OPHTHALMOLOGY

## 2023-08-18 PROCEDURE — 3288F PR FALLS RISK ASSESSMENT DOCUMENTED: ICD-10-PCS | Mod: HCNC,CPTII,S$GLB, | Performed by: OPHTHALMOLOGY

## 2023-08-18 PROCEDURE — 1126F AMNT PAIN NOTED NONE PRSNT: CPT | Mod: HCNC,CPTII,S$GLB, | Performed by: OPHTHALMOLOGY

## 2023-08-18 PROCEDURE — 1126F PR PAIN SEVERITY QUANTIFIED, NO PAIN PRESENT: ICD-10-PCS | Mod: HCNC,CPTII,S$GLB, | Performed by: OPHTHALMOLOGY

## 2023-08-18 PROCEDURE — 99215 OFFICE O/P EST HI 40 MIN: CPT | Mod: 57,HCNC,S$GLB, | Performed by: OPHTHALMOLOGY

## 2023-08-18 PROCEDURE — 99215 PR OFFICE/OUTPT VISIT, EST, LEVL V, 40-54 MIN: ICD-10-PCS | Mod: 57,HCNC,S$GLB, | Performed by: OPHTHALMOLOGY

## 2023-08-18 PROCEDURE — 3288F FALL RISK ASSESSMENT DOCD: CPT | Mod: HCNC,CPTII,S$GLB, | Performed by: OPHTHALMOLOGY

## 2023-08-18 PROCEDURE — 2023F DILAT RTA XM W/O RTNOPTHY: CPT | Mod: HCNC,CPTII,S$GLB, | Performed by: OPHTHALMOLOGY

## 2023-08-18 PROCEDURE — 66821 AFTER CATARACT LASER SURGERY: CPT | Mod: HCNC,50,S$GLB, | Performed by: OPHTHALMOLOGY

## 2023-08-18 PROCEDURE — 1100F PTFALLS ASSESS-DOCD GE2>/YR: CPT | Mod: HCNC,CPTII,S$GLB, | Performed by: OPHTHALMOLOGY

## 2023-08-18 PROCEDURE — 99999 PR PBB SHADOW E&M-EST. PATIENT-LVL III: ICD-10-PCS | Mod: PBBFAC,HCNC,, | Performed by: OPHTHALMOLOGY

## 2023-08-18 PROCEDURE — 99999 PR PBB SHADOW E&M-EST. PATIENT-LVL III: CPT | Mod: PBBFAC,HCNC,, | Performed by: OPHTHALMOLOGY

## 2023-08-18 PROCEDURE — 66821 YAG CAPSULOTOMY - OD - RIGHT EYE: ICD-10-PCS | Mod: HCNC,50,S$GLB, | Performed by: OPHTHALMOLOGY

## 2023-08-21 ENCOUNTER — OFFICE VISIT (OUTPATIENT)
Dept: INTERNAL MEDICINE | Facility: CLINIC | Age: 83
End: 2023-08-21
Payer: MEDICARE

## 2023-08-21 ENCOUNTER — LAB VISIT (OUTPATIENT)
Dept: LAB | Facility: HOSPITAL | Age: 83
End: 2023-08-21
Payer: MEDICARE

## 2023-08-21 VITALS
WEIGHT: 206.69 LBS | DIASTOLIC BLOOD PRESSURE: 72 MMHG | BODY MASS INDEX: 31.32 KG/M2 | OXYGEN SATURATION: 98 % | HEIGHT: 68 IN | SYSTOLIC BLOOD PRESSURE: 138 MMHG | HEART RATE: 78 BPM

## 2023-08-21 DIAGNOSIS — E78.2 MIXED HYPERLIPIDEMIA: ICD-10-CM

## 2023-08-21 DIAGNOSIS — Z79.4 TYPE 2 DIABETES MELLITUS WITHOUT COMPLICATION, WITH LONG-TERM CURRENT USE OF INSULIN: ICD-10-CM

## 2023-08-21 DIAGNOSIS — I10 ESSENTIAL HYPERTENSION: Chronic | ICD-10-CM

## 2023-08-21 DIAGNOSIS — Z12.31 SCREENING MAMMOGRAM, ENCOUNTER FOR: ICD-10-CM

## 2023-08-21 DIAGNOSIS — M79.89 LEG SWELLING: Primary | ICD-10-CM

## 2023-08-21 DIAGNOSIS — E11.9 TYPE 2 DIABETES MELLITUS WITHOUT COMPLICATION, WITH LONG-TERM CURRENT USE OF INSULIN: ICD-10-CM

## 2023-08-21 DIAGNOSIS — M79.89 LEG SWELLING: ICD-10-CM

## 2023-08-21 LAB
ALBUMIN SERPL BCP-MCNC: 3.3 G/DL (ref 3.5–5.2)
ALP SERPL-CCNC: 82 U/L (ref 55–135)
ALT SERPL W/O P-5'-P-CCNC: 10 U/L (ref 10–44)
ANION GAP SERPL CALC-SCNC: 10 MMOL/L (ref 8–16)
AST SERPL-CCNC: 15 U/L (ref 10–40)
BASOPHILS # BLD AUTO: 0.07 K/UL (ref 0–0.2)
BASOPHILS NFR BLD: 0.7 % (ref 0–1.9)
BILIRUB SERPL-MCNC: 0.4 MG/DL (ref 0.1–1)
BUN SERPL-MCNC: 9 MG/DL (ref 8–23)
CALCIUM SERPL-MCNC: 9.6 MG/DL (ref 8.7–10.5)
CHLORIDE SERPL-SCNC: 104 MMOL/L (ref 95–110)
CO2 SERPL-SCNC: 30 MMOL/L (ref 23–29)
CREAT SERPL-MCNC: 1.1 MG/DL (ref 0.5–1.4)
DIFFERENTIAL METHOD: ABNORMAL
EOSINOPHIL # BLD AUTO: 0.3 K/UL (ref 0–0.5)
EOSINOPHIL NFR BLD: 2.9 % (ref 0–8)
ERYTHROCYTE [DISTWIDTH] IN BLOOD BY AUTOMATED COUNT: 14.9 % (ref 11.5–14.5)
EST. GFR  (NO RACE VARIABLE): 49.9 ML/MIN/1.73 M^2
GLUCOSE SERPL-MCNC: 87 MG/DL (ref 70–110)
HCT VFR BLD AUTO: 35.6 % (ref 37–48.5)
HGB BLD-MCNC: 10.9 G/DL (ref 12–16)
IMM GRANULOCYTES # BLD AUTO: 0.04 K/UL (ref 0–0.04)
IMM GRANULOCYTES NFR BLD AUTO: 0.4 % (ref 0–0.5)
LYMPHOCYTES # BLD AUTO: 2.4 K/UL (ref 1–4.8)
LYMPHOCYTES NFR BLD: 25 % (ref 18–48)
MCH RBC QN AUTO: 26.4 PG (ref 27–31)
MCHC RBC AUTO-ENTMCNC: 30.6 G/DL (ref 32–36)
MCV RBC AUTO: 86 FL (ref 82–98)
MONOCYTES # BLD AUTO: 0.9 K/UL (ref 0.3–1)
MONOCYTES NFR BLD: 10 % (ref 4–15)
NEUTROPHILS # BLD AUTO: 5.7 K/UL (ref 1.8–7.7)
NEUTROPHILS NFR BLD: 61 % (ref 38–73)
NRBC BLD-RTO: 0 /100 WBC
PLATELET # BLD AUTO: 283 K/UL (ref 150–450)
PMV BLD AUTO: 9.6 FL (ref 9.2–12.9)
POTASSIUM SERPL-SCNC: 3.7 MMOL/L (ref 3.5–5.1)
PROT SERPL-MCNC: 7.5 G/DL (ref 6–8.4)
RBC # BLD AUTO: 4.13 M/UL (ref 4–5.4)
SODIUM SERPL-SCNC: 144 MMOL/L (ref 136–145)
WBC # BLD AUTO: 9.41 K/UL (ref 3.9–12.7)

## 2023-08-21 PROCEDURE — 1160F PR REVIEW ALL MEDS BY PRESCRIBER/CLIN PHARMACIST DOCUMENTED: ICD-10-PCS | Mod: HCNC,CPTII,S$GLB, | Performed by: PHYSICIAN ASSISTANT

## 2023-08-21 PROCEDURE — 1126F PR PAIN SEVERITY QUANTIFIED, NO PAIN PRESENT: ICD-10-PCS | Mod: HCNC,CPTII,S$GLB, | Performed by: PHYSICIAN ASSISTANT

## 2023-08-21 PROCEDURE — 85025 COMPLETE CBC W/AUTO DIFF WBC: CPT | Mod: HCNC | Performed by: PHYSICIAN ASSISTANT

## 2023-08-21 PROCEDURE — 1101F PT FALLS ASSESS-DOCD LE1/YR: CPT | Mod: HCNC,CPTII,S$GLB, | Performed by: PHYSICIAN ASSISTANT

## 2023-08-21 PROCEDURE — 1126F AMNT PAIN NOTED NONE PRSNT: CPT | Mod: HCNC,CPTII,S$GLB, | Performed by: PHYSICIAN ASSISTANT

## 2023-08-21 PROCEDURE — 99214 OFFICE O/P EST MOD 30 MIN: CPT | Mod: HCNC,S$GLB,, | Performed by: PHYSICIAN ASSISTANT

## 2023-08-21 PROCEDURE — 3075F SYST BP GE 130 - 139MM HG: CPT | Mod: HCNC,CPTII,S$GLB, | Performed by: PHYSICIAN ASSISTANT

## 2023-08-21 PROCEDURE — 1160F RVW MEDS BY RX/DR IN RCRD: CPT | Mod: HCNC,CPTII,S$GLB, | Performed by: PHYSICIAN ASSISTANT

## 2023-08-21 PROCEDURE — 3078F PR MOST RECENT DIASTOLIC BLOOD PRESSURE < 80 MM HG: ICD-10-PCS | Mod: HCNC,CPTII,S$GLB, | Performed by: PHYSICIAN ASSISTANT

## 2023-08-21 PROCEDURE — 99499 UNLISTED E&M SERVICE: CPT | Mod: S$GLB,,, | Performed by: PHYSICIAN ASSISTANT

## 2023-08-21 PROCEDURE — 99999 PR PBB SHADOW E&M-EST. PATIENT-LVL V: CPT | Mod: PBBFAC,HCNC,, | Performed by: PHYSICIAN ASSISTANT

## 2023-08-21 PROCEDURE — 3288F PR FALLS RISK ASSESSMENT DOCUMENTED: ICD-10-PCS | Mod: HCNC,CPTII,S$GLB, | Performed by: PHYSICIAN ASSISTANT

## 2023-08-21 PROCEDURE — 99999 PR PBB SHADOW E&M-EST. PATIENT-LVL V: ICD-10-PCS | Mod: PBBFAC,HCNC,, | Performed by: PHYSICIAN ASSISTANT

## 2023-08-21 PROCEDURE — 36415 COLL VENOUS BLD VENIPUNCTURE: CPT | Mod: HCNC | Performed by: PHYSICIAN ASSISTANT

## 2023-08-21 PROCEDURE — 80053 COMPREHEN METABOLIC PANEL: CPT | Mod: HCNC | Performed by: PHYSICIAN ASSISTANT

## 2023-08-21 PROCEDURE — 1159F MED LIST DOCD IN RCRD: CPT | Mod: HCNC,CPTII,S$GLB, | Performed by: PHYSICIAN ASSISTANT

## 2023-08-21 PROCEDURE — 3078F DIAST BP <80 MM HG: CPT | Mod: HCNC,CPTII,S$GLB, | Performed by: PHYSICIAN ASSISTANT

## 2023-08-21 PROCEDURE — 3075F PR MOST RECENT SYSTOLIC BLOOD PRESS GE 130-139MM HG: ICD-10-PCS | Mod: HCNC,CPTII,S$GLB, | Performed by: PHYSICIAN ASSISTANT

## 2023-08-21 PROCEDURE — 99214 PR OFFICE/OUTPT VISIT, EST, LEVL IV, 30-39 MIN: ICD-10-PCS | Mod: HCNC,S$GLB,, | Performed by: PHYSICIAN ASSISTANT

## 2023-08-21 PROCEDURE — 1159F PR MEDICATION LIST DOCUMENTED IN MEDICAL RECORD: ICD-10-PCS | Mod: HCNC,CPTII,S$GLB, | Performed by: PHYSICIAN ASSISTANT

## 2023-08-21 PROCEDURE — 3288F FALL RISK ASSESSMENT DOCD: CPT | Mod: HCNC,CPTII,S$GLB, | Performed by: PHYSICIAN ASSISTANT

## 2023-08-21 PROCEDURE — 99499 RISK ADDL DX/OHS AUDIT: ICD-10-PCS | Mod: S$GLB,,, | Performed by: PHYSICIAN ASSISTANT

## 2023-08-21 PROCEDURE — 1101F PR PT FALLS ASSESS DOC 0-1 FALLS W/OUT INJ PAST YR: ICD-10-PCS | Mod: HCNC,CPTII,S$GLB, | Performed by: PHYSICIAN ASSISTANT

## 2023-08-21 RX ORDER — FUROSEMIDE 20 MG/1
TABLET ORAL
Qty: 30 TABLET | Refills: 3 | Status: SHIPPED | OUTPATIENT
Start: 2023-08-21 | End: 2024-03-06 | Stop reason: SDUPTHER

## 2023-08-21 NOTE — PROGRESS NOTES
Subjective:       Patient ID: Henrietta Villasenor is a 83 y.o. female.        Chief Complaint: Follow-up    Henrietta Villasenor is an established patient of Jeannine Arriaga MD here today for follow up visit.    She complains of intermittent leg swelling for the past few months.  Lasix 40 mg was d/c at hospital discharge for HANNAH from obstructing kidney stone s/p stent 4/2023.  No chest pain or shortness of breath.     Her grandson lives with her but does not help take care of her.  Her granddaughter is in the hospital with a sickle cell crisis.  Her niece, Kacie, helps at times.  Her daughter lives in [place].       DM -   Blood sugar   Recall history of severe hypoglycemia down to 39 in clinic, insulin dosages have been gradually reduced    Lantus 40 U  Novolog 15/15/15 (hold for blood sugar <100)  Trulicity 0.75 mg/week (new)    Still does not have klever CGM    Has upcoming lab work and appointment with Marco Antonio Hunter NP     H/o HANNAH 4/2023 while admitted      Lipids -   Atorvastatin 40 mg     CAD     HTN -  Amlodipine 10 mg  Coreg 25 mg BID     CKD 3     H/o DVT -  Eliquis 5 mg BID     Heart failure -   See above, leg swelling lately but no chest pain or shortness of breath     MARLON on CPAP             Review of Systems   Constitutional:  Negative for chills, diaphoresis, fatigue and fever.   HENT:  Negative for congestion and sore throat.    Eyes:  Negative for visual disturbance.   Respiratory:  Negative for cough, chest tightness and shortness of breath.    Cardiovascular:  Negative for chest pain, palpitations and leg swelling.   Gastrointestinal:  Negative for abdominal pain, blood in stool, constipation, diarrhea, nausea and vomiting.   Genitourinary:  Negative for dysuria, frequency, hematuria and urgency.   Musculoskeletal:  Negative for arthralgias and back pain.   Skin:  Negative for rash.   Neurological:  Negative for dizziness, syncope, weakness and headaches.   Psychiatric/Behavioral:  Negative for  dysphoric mood and sleep disturbance. The patient is not nervous/anxious.        Objective:      Physical Exam  Vitals and nursing note reviewed.   Constitutional:       Appearance: Normal appearance. She is well-developed.   HENT:      Head: Normocephalic.      Right Ear: External ear normal.      Left Ear: External ear normal.   Eyes:      Pupils: Pupils are equal, round, and reactive to light.   Cardiovascular:      Rate and Rhythm: Normal rate and regular rhythm.      Heart sounds: Normal heart sounds. No murmur heard.     No friction rub. No gallop.   Pulmonary:      Effort: Pulmonary effort is normal. No respiratory distress.      Breath sounds: Normal breath sounds.   Abdominal:      Palpations: Abdomen is soft.      Tenderness: There is no abdominal tenderness.   Skin:     General: Skin is warm and dry.   Neurological:      Mental Status: She is alert.         Assessment:       1. Leg swelling    2. Screening mammogram, encounter for    3. Type 2 diabetes mellitus without complication, with long-term current use of insulin    4. Essential hypertension    5. Mixed hyperlipidemia        Plan:       Henrietta was seen today for follow-up.    Diagnoses and all orders for this visit:    Leg swelling - none today but sx have been intermittent and bothersome   -     START: furosemide (LASIX) 20 MG tablet; Take 1 tablet by mouth on Monday, Wednesday, and Friday - will titrate dosage as needed and determine repeat labs after results received today  -     CBC Auto Differential; Future  -     Comprehensive Metabolic Panel; Future    Screening mammogram, encounter for  -     Mammo Digital Screening Bilat w/ Prakash; Future    Type 2 diabetes mellitus without complication, with long-term current use of insulin - stable and controlled, continue current regimen, lab work reviewed, upcoming visit with Marco Antonio Hunter NP, medications reviewed  Lantus 40 U, Novolog 15/15/15, Trulicity 0.75 mg/week on Friday  A1C goal <8  H/o severe  "hypoglycemia  Lab Results   Component Value Date    HGBA1C 8.2 (H) 03/08/2023    HGBA1C 8.6 (H) 09/09/2022    HGBA1C 9.3 (H) 03/17/2022     Essential hypertension - stable and controlled, continue current regimen  BP Readings from Last 5 Encounters:   08/21/23 138/72   08/08/23 114/68   07/07/23 (!) 145/70   07/05/23 122/80   06/12/23 (!) 167/79      Mixed hyperlipidemia - stable and controlled, continue current regimen, lab work reviewed  Lab Results   Component Value Date    CHOL 169 05/19/2021    TRIG 82 05/19/2021    HDL 45 05/19/2021    LDLCALC 107.6 05/19/2021       Pt has been given instructions populated from patient instructions database and has verbalized understanding of the after visit summary and information contained wherein.    Follow up with a primary care provider. May go to ER for acute shortness of breath, lightheadedness, fever, or any other emergent complaints or changes in condition.    "This note will be shared with the patient"    Future Appointments   Date Time Provider Department Center   8/30/2023  1:15 PM Lorna Ho DPM [facility] POD [facility]   9/8/2023 11:00 AM LAB, SPECIMEN [facility]   9/8/2023 11:20 AM LAB, APPOINTMENT [facility]   9/15/2023  4:00 PM Marco Antonio Hunter, APRN, FNP [facility] [address] [facility]   10/4/2023 12:30 PM Jeannine Arriaga MD [facility] [address] [facility]                 "

## 2023-08-21 NOTE — PATIENT INSTRUCTIONS
Restart lasix (fluid pill) on Monday, Wednesday, and Friday   <-- Click to add NO significant Past Surgical History

## 2023-08-22 ENCOUNTER — TELEPHONE (OUTPATIENT)
Dept: OPHTHALMOLOGY | Facility: CLINIC | Age: 83
End: 2023-08-22
Payer: MEDICARE

## 2023-08-22 ENCOUNTER — TELEPHONE (OUTPATIENT)
Dept: INTERNAL MEDICINE | Facility: CLINIC | Age: 83
End: 2023-08-22
Payer: MEDICARE

## 2023-08-22 DIAGNOSIS — M79.89 LEG SWELLING: Primary | ICD-10-CM

## 2023-08-22 NOTE — TELEPHONE ENCOUNTER
----- Message from Margaret Cabrera PA-C sent at 8/22/2023  6:24 AM CDT -----  Please call patient  Kidney level is stable  Please link a BMP to her lab visit scheduled 9/8/23  Please REMIND her of this lab appointment

## 2023-08-30 ENCOUNTER — OFFICE VISIT (OUTPATIENT)
Dept: PODIATRY | Facility: CLINIC | Age: 83
End: 2023-08-30
Payer: MEDICARE

## 2023-08-30 VITALS
HEIGHT: 68 IN | SYSTOLIC BLOOD PRESSURE: 130 MMHG | HEART RATE: 78 BPM | BODY MASS INDEX: 31.34 KG/M2 | WEIGHT: 206.81 LBS | DIASTOLIC BLOOD PRESSURE: 87 MMHG

## 2023-08-30 DIAGNOSIS — B35.1 ONYCHOMYCOSIS DUE TO DERMATOPHYTE: ICD-10-CM

## 2023-08-30 DIAGNOSIS — E11.42 TYPE 2 DIABETES MELLITUS WITH DIABETIC POLYNEUROPATHY, WITH LONG-TERM CURRENT USE OF INSULIN: Primary | ICD-10-CM

## 2023-08-30 DIAGNOSIS — L84 CORN OR CALLUS: ICD-10-CM

## 2023-08-30 DIAGNOSIS — Z79.4 TYPE 2 DIABETES MELLITUS WITH DIABETIC POLYNEUROPATHY, WITH LONG-TERM CURRENT USE OF INSULIN: Primary | ICD-10-CM

## 2023-08-30 PROCEDURE — 11721 DEBRIDE NAIL 6 OR MORE: CPT | Mod: 59,Q9,HCNC,S$GLB | Performed by: PODIATRIST

## 2023-08-30 PROCEDURE — 11056 PARNG/CUTG B9 HYPRKR LES 2-4: CPT | Mod: Q9,HCNC,S$GLB, | Performed by: PODIATRIST

## 2023-08-30 PROCEDURE — 11721 PR DEBRIDEMENT OF NAILS, 6 OR MORE: ICD-10-PCS | Mod: 59,Q9,HCNC,S$GLB | Performed by: PODIATRIST

## 2023-08-30 PROCEDURE — 99999 PR PBB SHADOW E&M-EST. PATIENT-LVL III: CPT | Mod: PBBFAC,HCNC,, | Performed by: PODIATRIST

## 2023-08-30 PROCEDURE — 99999 PR PBB SHADOW E&M-EST. PATIENT-LVL III: ICD-10-PCS | Mod: PBBFAC,HCNC,, | Performed by: PODIATRIST

## 2023-08-30 PROCEDURE — 99499 NO LOS: ICD-10-PCS | Mod: HCNC,S$GLB,, | Performed by: PODIATRIST

## 2023-08-30 PROCEDURE — 99499 UNLISTED E&M SERVICE: CPT | Mod: HCNC,S$GLB,, | Performed by: PODIATRIST

## 2023-08-30 PROCEDURE — 11056 PR TRIM BENIGN HYPERKERATOTIC SKIN LESION,2-4: ICD-10-PCS | Mod: Q9,HCNC,S$GLB, | Performed by: PODIATRIST

## 2023-09-07 RX ORDER — OXYCODONE AND ACETAMINOPHEN 10; 325 MG/1; MG/1
1 TABLET ORAL
Qty: 150 TABLET | Refills: 0 | Status: SHIPPED | OUTPATIENT
Start: 2023-09-07 | End: 2023-10-04 | Stop reason: SDUPTHER

## 2023-09-07 NOTE — TELEPHONE ENCOUNTER
No care due was identified.  [facility] Embedded Care Due Messages. Reference number: [id number].   9/07/2023 9:04:25 AM CDT

## 2023-09-08 ENCOUNTER — LAB VISIT (OUTPATIENT)
Dept: LAB | Facility: HOSPITAL | Age: 83
End: 2023-09-08
Payer: MEDICARE

## 2023-09-08 DIAGNOSIS — N18.31 TYPE 2 DIABETES MELLITUS WITH STAGE 3A CHRONIC KIDNEY DISEASE, WITH LONG-TERM CURRENT USE OF INSULIN: ICD-10-CM

## 2023-09-08 DIAGNOSIS — M79.89 LEG SWELLING: ICD-10-CM

## 2023-09-08 DIAGNOSIS — E78.2 MIXED HYPERLIPIDEMIA: ICD-10-CM

## 2023-09-08 DIAGNOSIS — E11.22 TYPE 2 DIABETES MELLITUS WITH STAGE 3A CHRONIC KIDNEY DISEASE, WITH LONG-TERM CURRENT USE OF INSULIN: ICD-10-CM

## 2023-09-08 DIAGNOSIS — Z79.4 TYPE 2 DIABETES MELLITUS WITH STAGE 3A CHRONIC KIDNEY DISEASE, WITH LONG-TERM CURRENT USE OF INSULIN: ICD-10-CM

## 2023-09-08 LAB
ANION GAP SERPL CALC-SCNC: 10 MMOL/L (ref 8–16)
BUN SERPL-MCNC: 14 MG/DL (ref 8–23)
CALCIUM SERPL-MCNC: 9.7 MG/DL (ref 8.7–10.5)
CHLORIDE SERPL-SCNC: 108 MMOL/L (ref 95–110)
CHOLEST SERPL-MCNC: 156 MG/DL (ref 120–199)
CHOLEST/HDLC SERPL: 3.9 {RATIO} (ref 2–5)
CO2 SERPL-SCNC: 27 MMOL/L (ref 23–29)
CREAT SERPL-MCNC: 1.3 MG/DL (ref 0.5–1.4)
EST. GFR  (NO RACE VARIABLE): 40.8 ML/MIN/1.73 M^2
ESTIMATED AVG GLUCOSE: 137 MG/DL (ref 68–131)
GLUCOSE SERPL-MCNC: 156 MG/DL (ref 70–110)
HBA1C MFR BLD: 6.4 % (ref 4–5.6)
HDLC SERPL-MCNC: 40 MG/DL (ref 40–75)
HDLC SERPL: 25.6 % (ref 20–50)
LDLC SERPL CALC-MCNC: 98.6 MG/DL (ref 63–159)
NONHDLC SERPL-MCNC: 116 MG/DL
POTASSIUM SERPL-SCNC: 3.8 MMOL/L (ref 3.5–5.1)
SODIUM SERPL-SCNC: 145 MMOL/L (ref 136–145)
TRIGL SERPL-MCNC: 87 MG/DL (ref 30–150)

## 2023-09-08 PROCEDURE — 80061 LIPID PANEL: CPT | Mod: HCNC | Performed by: NURSE PRACTITIONER

## 2023-09-08 PROCEDURE — 36415 COLL VENOUS BLD VENIPUNCTURE: CPT | Mod: HCNC | Performed by: NURSE PRACTITIONER

## 2023-09-08 PROCEDURE — 80048 BASIC METABOLIC PNL TOTAL CA: CPT | Mod: HCNC | Performed by: PHYSICIAN ASSISTANT

## 2023-09-08 PROCEDURE — 83036 HEMOGLOBIN GLYCOSYLATED A1C: CPT | Mod: HCNC | Performed by: NURSE PRACTITIONER

## 2023-09-11 ENCOUNTER — TELEPHONE (OUTPATIENT)
Dept: INTERNAL MEDICINE | Facility: CLINIC | Age: 83
End: 2023-09-11
Payer: MEDICARE

## 2023-09-11 ENCOUNTER — OFFICE VISIT (OUTPATIENT)
Dept: INTERNAL MEDICINE | Facility: CLINIC | Age: 83
End: 2023-09-11
Payer: MEDICARE

## 2023-09-11 VITALS
WEIGHT: 204.81 LBS | DIASTOLIC BLOOD PRESSURE: 74 MMHG | HEART RATE: 80 BPM | BODY MASS INDEX: 31.04 KG/M2 | SYSTOLIC BLOOD PRESSURE: 120 MMHG | OXYGEN SATURATION: 98 % | TEMPERATURE: 98 F | HEIGHT: 68 IN

## 2023-09-11 DIAGNOSIS — R09.82 POSTNASAL DRIP: ICD-10-CM

## 2023-09-11 DIAGNOSIS — R05.9 COUGH IN ADULT: Primary | ICD-10-CM

## 2023-09-11 DIAGNOSIS — E66.9 OBESITY (BMI 30-39.9): ICD-10-CM

## 2023-09-11 PROCEDURE — 1160F RVW MEDS BY RX/DR IN RCRD: CPT | Mod: HCNC,CPTII,S$GLB, | Performed by: NURSE PRACTITIONER

## 2023-09-11 PROCEDURE — 3078F PR MOST RECENT DIASTOLIC BLOOD PRESSURE < 80 MM HG: ICD-10-PCS | Mod: HCNC,CPTII,S$GLB, | Performed by: NURSE PRACTITIONER

## 2023-09-11 PROCEDURE — 3074F SYST BP LT 130 MM HG: CPT | Mod: HCNC,CPTII,S$GLB, | Performed by: NURSE PRACTITIONER

## 2023-09-11 PROCEDURE — 3288F FALL RISK ASSESSMENT DOCD: CPT | Mod: HCNC,CPTII,S$GLB, | Performed by: NURSE PRACTITIONER

## 2023-09-11 PROCEDURE — 99214 PR OFFICE/OUTPT VISIT, EST, LEVL IV, 30-39 MIN: ICD-10-PCS | Mod: HCNC,S$GLB,, | Performed by: NURSE PRACTITIONER

## 2023-09-11 PROCEDURE — 99999 PR PBB SHADOW E&M-EST. PATIENT-LVL V: ICD-10-PCS | Mod: PBBFAC,HCNC,, | Performed by: NURSE PRACTITIONER

## 2023-09-11 PROCEDURE — 3288F PR FALLS RISK ASSESSMENT DOCUMENTED: ICD-10-PCS | Mod: HCNC,CPTII,S$GLB, | Performed by: NURSE PRACTITIONER

## 2023-09-11 PROCEDURE — 3078F DIAST BP <80 MM HG: CPT | Mod: HCNC,CPTII,S$GLB, | Performed by: NURSE PRACTITIONER

## 2023-09-11 PROCEDURE — 1101F PT FALLS ASSESS-DOCD LE1/YR: CPT | Mod: HCNC,CPTII,S$GLB, | Performed by: NURSE PRACTITIONER

## 2023-09-11 PROCEDURE — 1101F PR PT FALLS ASSESS DOC 0-1 FALLS W/OUT INJ PAST YR: ICD-10-PCS | Mod: HCNC,CPTII,S$GLB, | Performed by: NURSE PRACTITIONER

## 2023-09-11 PROCEDURE — 3074F PR MOST RECENT SYSTOLIC BLOOD PRESSURE < 130 MM HG: ICD-10-PCS | Mod: HCNC,CPTII,S$GLB, | Performed by: NURSE PRACTITIONER

## 2023-09-11 PROCEDURE — 1125F PR PAIN SEVERITY QUANTIFIED, PAIN PRESENT: ICD-10-PCS | Mod: HCNC,CPTII,S$GLB, | Performed by: NURSE PRACTITIONER

## 2023-09-11 PROCEDURE — 99999 PR PBB SHADOW E&M-EST. PATIENT-LVL V: CPT | Mod: PBBFAC,HCNC,, | Performed by: NURSE PRACTITIONER

## 2023-09-11 PROCEDURE — 99214 OFFICE O/P EST MOD 30 MIN: CPT | Mod: HCNC,S$GLB,, | Performed by: NURSE PRACTITIONER

## 2023-09-11 PROCEDURE — 1159F PR MEDICATION LIST DOCUMENTED IN MEDICAL RECORD: ICD-10-PCS | Mod: HCNC,CPTII,S$GLB, | Performed by: NURSE PRACTITIONER

## 2023-09-11 PROCEDURE — 1160F PR REVIEW ALL MEDS BY PRESCRIBER/CLIN PHARMACIST DOCUMENTED: ICD-10-PCS | Mod: HCNC,CPTII,S$GLB, | Performed by: NURSE PRACTITIONER

## 2023-09-11 PROCEDURE — 1125F AMNT PAIN NOTED PAIN PRSNT: CPT | Mod: HCNC,CPTII,S$GLB, | Performed by: NURSE PRACTITIONER

## 2023-09-11 PROCEDURE — 1159F MED LIST DOCD IN RCRD: CPT | Mod: HCNC,CPTII,S$GLB, | Performed by: NURSE PRACTITIONER

## 2023-09-11 RX ORDER — BENZONATATE 100 MG/1
100 CAPSULE ORAL 3 TIMES DAILY PRN
Qty: 30 CAPSULE | Refills: 0 | Status: SHIPPED | OUTPATIENT
Start: 2023-09-11 | End: 2023-11-10 | Stop reason: SDUPTHER

## 2023-09-11 RX ORDER — FLUTICASONE PROPIONATE 50 MCG
SPRAY, SUSPENSION (ML) NASAL
Qty: 16 G | Refills: 1 | Status: SHIPPED | OUTPATIENT
Start: 2023-09-11

## 2023-09-11 NOTE — TELEPHONE ENCOUNTER
----- Message from Margaret Cabrera PA-C sent at 9/11/2023  6:15 AM CDT -----  Creatinine/kidney overall stable

## 2023-09-11 NOTE — PROGRESS NOTES
Subjective     Patient ID: Henrietta Villasenor is a 83 y.o. female.    Chief Complaint: Cough, Generalized Body Aches, and Shortness of Breath    Pt of Dr. Arriaga, here for complaint of cough    Cough  This is a recurrent problem. The current episode started in the past 7 days (5 days). The problem has been unchanged. The problem occurs every few minutes. The cough is Productive of sputum. Associated symptoms include chills, ear congestion, ear pain, headaches, myalgias, postnasal drip and shortness of breath. Pertinent negatives include no chest pain, fever, heartburn, hemoptysis, nasal congestion, rhinorrhea, sore throat, sweats, weight loss or wheezing. The symptoms are aggravated by lying down. Risk factors: sleep with seiling fans. Treatments tried: Tylenol. The treatment provided no relief. Her past medical history is significant for COPD and environmental allergies. There is no history of asthma, bronchiectasis, bronchitis, emphysema or pneumonia.     Review of Systems   Constitutional:  Positive for chills. Negative for fever and weight loss.   HENT:  Positive for nasal congestion, ear pain, postnasal drip, sinus pressure/congestion and sneezing. Negative for rhinorrhea and sore throat.    Respiratory:  Positive for shortness of breath. Negative for hemoptysis and wheezing.    Cardiovascular:  Negative for chest pain.   Gastrointestinal:  Positive for diarrhea. Negative for constipation, heartburn and nausea.   Musculoskeletal:  Positive for arthralgias and myalgias.   Allergic/Immunologic: Positive for environmental allergies. Negative for food allergies and immunocompromised state.        Sleeps with ceiling fan   Neurological:  Positive for headaches. Negative for dizziness, weakness and light-headedness.   Hematological:  Negative for adenopathy. Does not bruise/bleed easily.   Psychiatric/Behavioral:  Negative for suicidal ideas.      Review of patient's allergies indicates:   Allergen Reactions     "Crestor [rosuvastatin]      Cramping in legs    Ezetimibe Diarrhea     Other reaction(s): abdominal pain, Diarrhea      Hydrocodone Itching    Lisinopril      Other reaction(s): cough      Sulfa (sulfonamide antibiotics) Itching and Rash           Sulfamethoxazole-trimethoprim     Valsartan Swelling     Current Outpatient Medications:     ACCU-CHEK GUIDE TEST STRIPS Strp, TEST BLOOD SUGAR THREE TIMES DAILY, Disp: 300 strip, Rfl: 3    ACCU-CHEK SOFT DEV LANCETS Kit, , Disp: , Rfl:     albuterol (PROVENTIL/VENTOLIN HFA) 90 mcg/actuation inhaler, Inhale 2 puffs by mouth into the lungs every 4 (four) hours as needed for Wheezing. Rescue, Disp: 25.5 g, Rfl: 3    amLODIPine (NORVASC) 10 MG tablet, Take 1 tablet (10 mg total) by mouth once daily. (Patient taking differently: Take 10 mg by mouth every evening.), Disp: 30 tablet, Rfl: 0    apixaban (ELIQUIS) 5 mg Tab, Take 1 tablet (5 mg total) by mouth 2 (two) times daily., Disp: 60 tablet, Rfl: 0    atorvastatin (LIPITOR) 40 MG tablet, Take 1 tablet (40 mg total) by mouth once daily., Disp: 90 tablet, Rfl: 1    BD ULTRA-FINE OLU PEN NEEDLE 32 gauge x 5/32" Ndle, Uses 4 times daily, on multiple daily insulin injections, Disp: 130 each, Rfl: 12    carvediloL (COREG) 25 MG tablet, Take 1 tablet (25 mg total) by mouth 2 (two) times daily., Disp: 180 tablet, Rfl: 1    cholecalciferol, vitamin D3, 125 mcg (5,000 unit) capsule, Take 5,000 Units by mouth once daily., Disp: , Rfl:     DROPLET PEN NEEDLE 29 gauge x 1/2" Ndle, USE FOUR TIMES DAILY, Disp: 400 each, Rfl: 3    DROPSAFE ALCOHOL PREP PADS PadM, USE TOPICALLY TO TEST BLOOD SUGAR FOUR TIMES DAILY, Disp: 400 each, Rfl: 3    dulaglutide (TRULICITY) 0.75 mg/0.5 mL pen injector, Inject 0.75 mg into the skin every 7 days., Disp: 4 pen, Rfl: 4    DULoxetine (CYMBALTA) 60 MG capsule, Take 1 capsule (60 mg total) by mouth 2 (two) times daily. Please hold until you follow up with your primary care provider, Disp: 180 capsule, Rfl: " 1    fluticasone propionate (FLONASE) 50 mcg/actuation nasal spray, Instill 2 sprays in each nostril once daily., Disp: 16 g, Rfl: 1    furosemide (LASIX) 20 MG tablet, Take 1 tablet by mouth on Monday, Wednesday, and Friday, Disp: 30 tablet, Rfl: 3    insulin (LANTUS SOLOSTAR U-100 INSULIN) glargine 100 units/mL SubQ pen, Inject 40 Units into the skin once daily. (Patient taking differently: Inject 40 Units into the skin every evening.), Disp: , Rfl:     insulin aspart U-100 (NOVOLOG) 100 unit/mL (3 mL) InPn pen, Inject 15 units before meals, hold if sugar is less than 100. Max daily 45 units., Disp: 45 mL, Rfl: 3    lancets (ACCU-CHEK SOFTCLIX LANCETS) Misc, TEST BLOOD SUGAR FOUR TIMES DAILY, Disp: 400 each, Rfl: 3    olopatadine (PATADAY) 0.2 % Drop, Place 1 drop into both eyes once daily., Disp: 2.5 mL, Rfl: 0    oxyCODONE-acetaminophen (PERCOCET)  mg per tablet, Take 1 tablet by mouth 5 (five) times daily., Disp: 150 tablet, Rfl: 0    pantoprazole (PROTONIX) 40 MG tablet, Take 1 tablet (40 mg total) by mouth once daily., Disp: 90 tablet, Rfl: 1    rOPINIRole (REQUIP) 0.5 MG tablet, Take 1 tablet (0.5 mg total) by mouth every evening., Disp: 30 tablet, Rfl: 0    blood-glucose meter kit, Use as instructed, Disp: 1 each, Rfl: 0    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    Neuralgia, neuritis, and radiculitis, unspecified    Abdominal pain, unspecified site    Ureteral calculus    Dermatitis due to drugs and medicines taken internally(693.0)    Dermatochalasis    Personal history of allergy to sulfonamides    Diastolic congestive heart failure    Knee pain    Anemia    Chronic right ear pain    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    Hearing loss,  sensorineural    Stage 3a chronic kidney disease    Coronary artery disease involving native coronary artery of native heart with angina pectoris    Lactose intolerance    Allergic rhinitis    Hyperlipidemia    Aortic atherosclerosis    Spinal enthesopathy of lumbar region    LLQ abdominal pain    Diverticulitis of large intestine without perforation or abscess without bleeding    Esophageal dysphagia    Fatty liver    Painful swallowing    Left facial numbness    Neck pain on left side    Type 2 diabetes mellitus, with long-term current use of insulin    Shortness of breath    Diabetic neuropathy associated with type 2 diabetes mellitus    Type 2 diabetes mellitus with stage 3 chronic kidney disease, with long-term current use of insulin    Obesity (BMI 30.0-34.9)    Low hemoglobin and low hematocrit    History of colon polyps    Type 2 diabetes mellitus with diabetic polyneuropathy, with long-term current use of insulin    Hyperparathyroidism, unspecified    Obesity, diabetes, and hypertension syndrome    Spinal stenosis of lumbar region with neurogenic claudication    Benign paroxysmal positional vertigo due to bilateral vestibular disorder    Tortuous aorta    Ectatic aorta    Insulin long-term use    Facet arthropathy    Mood disorder    Type 2 diabetes mellitus with hyperglycemia    Hypercholesterolemia    Hypovitaminosis D    Research study patient - TACT2 EDTA Chelation Study    History of MI (myocardial infarction)    Facial nerve palsy, secondary    Gout, arthritis    Vitreomacular adhesion, right    Epiretinal membrane, left    Chronic pain    Decreased ROM of lumbar spine    Weakness    Abnormality of gait and mobility    Decreased functional activity tolerance    Chest pain    Postmenopausal    Osteopenia    Thyroid nodule    Bilateral leg edema    Neuropathic pain    Hematoma of arm, left, initial encounter    Deep vein thrombosis (DVT) of popliteal vein of both lower extremities    PAD (peripheral  artery disease)    Memory loss    Venous insufficiency of both lower extremities    Chronic pain disorder    Decreased activities of daily living (ADL)    Impaired functional mobility, balance, gait, and endurance    Left leg pain    Chronic bilateral low back pain with sciatica    At risk for falls    Hydronephrosis with urinary obstruction due to ureteral calculus    History of DVT (deep vein thrombosis)    Hypoglycemia associated with diabetes    Hypomagnesemia    Hypokalemia       Past Medical History:   Diagnosis Date    Abnormality of lung 11/08/2011    Stable bandlike opacities at the lung bases, most likely representing      Anxiety     Arthritis     CAD (coronary artery disease)     Calculus of ureter 2/22/2011    CHF (congestive heart failure)     Chronic back pain 7/29/2012    Colon polyp 9/2010; 11/2010    Colon polyps 7/29/2012    Coronary artery disease involving native coronary artery of native heart with angina pectoris     Depression     Diabetes mellitus     Diabetes mellitus type II     Diverticulitis large intestine 7/27/2015    Diverticulitis of large intestine without perforation or abscess without bleeding     Diverticulosis 09/25/2010; 11/02/2010; 11/08/2011; 7/29/2012    Duodenal disorder 08/25/2011    Duodenal erosion noted on EGD.    Duodenal ulcer, unspecified as acute or chronic, without hemorrhage, perforation, or obstruction 8/24/2011    E. coli sepsis 12/2010    Due to left ureteral stone with left nephrostomy tube - hospitalized in [place]    Esophageal dysmotility 01/24/2012    Noted on upper GI-barium swallow.    Facial weakness 1969    Left facial weakness s/p left mastoidectomy in ~ 1969.    Fatty liver 11/08/2011    Reported on CT-abdomen and in 06/2012 Gastro clinic visit note.    Gastric polyp 09/29/2010    GERD (gastroesophageal reflux disease) 7/29/2012    Hepatomegaly 11/08/2011    Reported on CT-abdomen    Herpes zoster with other nervous system complications(053.19)  2/28/2011    Hiatal hernia 06/26/2006; 09/29/2010; 08/25/2011    Noted on barium swallow 2006; noted on  EGD 2011.    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    Trouble in sleeping     Type II or unspecified type diabetes mellitus with neurological manifestations, not stated as uncontrolled(250.60)     Type II or unspecified type diabetes mellitus with peripheral circulatory disorders, not stated as uncontrolled(250.70)     Type II or unspecified type diabetes mellitus with renal manifestations, not stated as uncontrolled(250.40)        Past Surgical History:   Procedure Laterality Date    BELPHAROPTOSIS REPAIR      s/p LAMBERTO levator repair - Dr. Dejesus    CARDIAC CATHETERIZATION      CARPAL TUNNEL RELEASE  3/13/2012    CATARACT EXTRACTION W/  INTRAOCULAR LENS IMPLANT Right 10/31/2018        CATARACT EXTRACTION W/  INTRAOCULAR LENS IMPLANT Left 03/22/2018        CHOLECYSTECTOMY      COLONOSCOPY N/A 11/16/2016    Procedure: COLONOSCOPY;  Surgeon: Chris Storey MD;  Location: [facility] ([address] [place] [address]);  Service: Endoscopy;  Laterality: N/A;    COLONOSCOPY N/A 6/14/2017    Procedure: COLONOSCOPY;  Surgeon: Chirs Storey MD;  [facility]  Service:  Endoscopy;  Laterality: N/A;  colonoscopy in 3 months with better bowel prep. - Split PEG prep ordered    CYSTOSCOPY  4/19/2023    Procedure: CYSTOSCOPY;  Surgeon: Domo Bates MD;  Location: [facility] OR 1ST FLR;  Service: Urology;;    CYSTOSCOPY N/A 5/12/2023    Procedure: CYSTOSCOPY;  Surgeon: Laz Rudolph Jr., MD;  Location: [facility] OR [facility] FLR;  Service: Urology;  Laterality: N/A;    CYSTOSCOPY N/A 7/7/2023    Procedure: CYSTOSCOPY;  Surgeon: Laz Rudolph Jr., MD;  Location: [facility] OR 1ST FLR;  Service: Urology;  Laterality: N/A;    ESOPHAGOGASTRODUODENOSCOPY N/A 11/10/2021    Procedure: EGD (ESOPHAGOGASTRODUODENOSCOPY);  Surgeon: Kuldeep Velázquez MD;  Location: [facility] (4TH FLR);  Service: Endoscopy;  Laterality: N/A;  11/4-eliquis hold ok see te -tb-fully vacc-inst mail and verbal-    EXTRACORPOREAL SHOCK WAVE LITHOTRIPSY      EXTRACTION - STONE Right 5/12/2023    Procedure: EXTRACTION - STONE;  Surgeon: Laz Rudolph Jr., MD;  [facility];  Service: Urology;  Laterality: Right;    INJECTION OF ANESTHETIC AGENT AROUND NERVE Bilateral 6/24/2020    Procedure: BLOCK, NERVE, C4-C5-C6 MEDIAL BRANCH ok per Talia to add on;  Surgeon: Oscar Ravi MD;  Location: [facility] PAIN MGT;  Service: Pain Management;  Laterality: Bilateral;    INTRAOCULAR PROSTHESES INSERTION Right 10/31/2018    Procedure: INSERTION-INTRAOCULAR LENS (IOL);  Surgeon: Keysha Valle MD;  Location: [facility] OR;  Service: Ophthalmology;  Laterality: Right;    JOINT REPLACEMENT      LASER LITHOTRIPSY Right 5/12/2023    Procedure: LITHOTRIPSY, USING LASER;  Surgeon: Laz Rudolph Jr., MD;  [facility];  Service: Urology;  Laterality: Right;    LASER LITHOTRIPSY Right 7/7/2023    Procedure: LITHOTRIPSY, USING LASER;  Surgeon: Laz Rudolph Jr., MD;  [facility];  Service: Urology;  Laterality: Right;    mastoid tumor removal  1969    Left mastoidectomy with residual left facial weakness.    NEPHROSTOMY       OOPHORECTOMY      PHACOEMULSIFICATION OF CATARACT Right 10/31/2018    Procedure: PHACOEMULSIFICATION-ASPIRATION-CATARACT;  Surgeon: Keysha Valle MD;  Location: [facility] OR;  Service: Ophthalmology;  Laterality: Right;    PYELOSCOPY Right 5/12/2023    Procedure: PYELOSCOPY;  Surgeon: Laz Rudolph Jr., MD;  Location: [facility] OR 1ST FLR;  Service: Urology;  Laterality: Right;    PYELOSCOPY Right 7/7/2023    Procedure: PYELOSCOPY;  Surgeon: Laz Rudolph Jr., MD;  Location: [facility] OR 1ST FLR;  Service: Urology;  Laterality: Right;    REMOVAL, STENT, URETER Right 5/12/2023    Procedure: REMOVAL, STENT, URETER;  Surgeon: Laz Rudolph Jr., MD;  Location: [facility] OR [facility] FLR;  Service: Urology;  Laterality: Right;    REMOVAL, STENT, URETER Right 7/7/2023    Procedure: REMOVAL, STENT, URETER;  Surgeon: Laz Rudolph Jr., MD;  [facility];  Service: Urology;  Laterality: Right;    RETROGRADE PYELOGRAPHY Right 4/19/2023    Procedure: PYELOGRAM, RETROGRADE;  Surgeon: Domo Bates MD;  [facility];  Service: Urology;  Laterality: Right;    TOTAL ABDOMINAL HYSTERECTOMY  1969    TAHBSO    TOTAL KNEE ARTHROPLASTY  9/13/2012    Left    TRANSFORAMINAL EPIDURAL INJECTION OF STEROID Bilateral 9/17/2019    Procedure: INJECTION, STEROID, EPIDURAL, TRANSFORAMINAL APPROACH;  Surgeon: Oscar Ravi MD;  Location: [facility] PAIN MGT;  Service: Pain Management;  Laterality: Bilateral;  B/L TFESI L4/L5    TRANSFORAMINAL EPIDURAL INJECTION OF STEROID Bilateral 10/2/2020    Procedure: INJECTION, STEROID, EPIDURAL, TRANSFORAMINAL APPROACH;  Surgeon: Oscar Ravi MD;  Location: [facility] PAIN MGT;  Service: Pain Management;  Laterality: Bilateral;  B/L TFESI L4/5    TRANSFORAMINAL EPIDURAL INJECTION OF STEROID Bilateral 5/7/2021    Procedure: INJECTION, STEROID, EPIDURAL, TRANSFORAMINAL APPROACH, L4-L5 clear to hold Eliquis 3 days;  Surgeon: Oscar Ravi MD;  Location: [facility] PAIN MGT;  Service: Pain  Management;  Laterality: Bilateral;    URETERAL STENT PLACEMENT Right 2023    Procedure: INSERTION, STENT, URETER;  Surgeon: Domo Bates MD;  Location: [facility] OR [facility] FLR;  Service: Urology;  Laterality: Right;    URETERAL STENT PLACEMENT N/A 2023    Procedure: INSERTION, STENT, URETER;  Surgeon: Laz Rudolph Jr., MD;  Location: [facility] OR [facility] FLR;  Service: Urology;  Laterality: N/A;    URETERAL STENT PLACEMENT Right 2023    Procedure: INSERTION, STENT, URETER;  Surgeon: Laz Rudolph Jr., MD;  [facility];  Service: Urology;  Laterality: Right;    URETEROSCOPY Right 2023    Procedure: URETEROSCOPY;  Surgeon: Laz Rudolph Jr., MD;  [facility];  Service: Urology;  Laterality: Right;    URETEROSCOPY Right 2023    Procedure: URETEROSCOPY;  Surgeon: Laz Rudolph Jr., MD;  [facility];  Service: Urology;  Laterality: Right;       Social History     Socioeconomic History    Marital status: Single    Number of children: 5   Tobacco Use    Smoking status: Former     Current packs/day: 0.00     Average packs/day: 1 pack/day for 15.0 years (15.0 ttl pk-yrs)     Types: Cigarettes     Start date: 1985     Quit date: 2000     Years since quittin.1    Smokeless tobacco: Never   Substance and Sexual Activity    Alcohol use: No    Drug use: No    Sexual activity: Never   Social History Narrative    Single with 5 children. Lives alone. Retired cook.     Social Determinants of Health     Financial Resource Strain: Medium Risk (2023)    Overall Financial Resource Strain (CARDIA)     Difficulty of Paying Living Expenses: Somewhat hard   Food Insecurity: No Food Insecurity (2023)    Hunger Vital Sign     Worried About Running Out of Food in the Last Year: Never true     Ran Out of Food in the Last Year: Never true   Transportation Needs: No Transportation Needs (2023)    PRAPARE - Transportation     Lack of Transportation  (Medical): No     Lack of Transportation (Non-Medical): No   Physical Activity: Insufficiently Active (4/26/2023)    Exercise Vital Sign     Days of Exercise per Week: 5 days     Minutes of Exercise per Session: 10 min   Stress: No Stress Concern Present (4/26/2023)    [employer] - Occupational Stress Questionnaire     Feeling of Stress : Only a little   Social Connections: Unknown (4/26/2023)    Social Connection and Isolation Panel [NHANES]     Frequency of Communication with Friends and Family: Never     Frequency of Social Gatherings with Friends and Family: Never     Attends Orthodox Services: Never     Active Member of Clubs or Organizations: No     Attends Club or Organization Meetings: Never   Housing Stability: Low Risk  (4/26/2023)    Housing Stability Vital Sign     Unable to Pay for Housing in the Last Year: No     Number of Places Lived in the Last Year: 1     Unstable Housing in the Last Year: No       Family History   Problem Relation Age of Onset    Sleep apnea Sister     Diabetes Sister     Cancer Mother         brain tumor    Hypertension Mother     Heart disease Father     Heart attack Father     Dementia Brother     Lupus Brother     Frontotemporal dementia Brother     No Known Problems Daughter     No Known Problems Son     Diabetes Sister     Lupus Sister     Breast cancer Sister     Diabetes Sister     Cancer Sister         breast cancer    Heart attack Sister     Diabetes Sister     Liver cancer Brother     Drug abuse Brother     Alcohol abuse Brother     Cirrhosis Brother     Drug abuse Brother     No Known Problems Daughter     No Known Problems Son     No Known Problems Son     No Known Problems Maternal Grandmother     No Known Problems Maternal Grandfather     No Known Problems Paternal Grandmother     No Known Problems Paternal Grandfather     Diabetes Brother     Dementia Brother     Diabetes Other     Ovarian cancer Other         Niece     Breast cancer  "Maternal Aunt     No Known Problems Maternal Uncle     No Known Problems Paternal Aunt     No Known Problems Paternal Uncle     Glaucoma Neg Hx     Blindness Neg Hx     Celiac disease Neg Hx     Colon cancer Neg Hx     Colon polyps Neg Hx     Esophageal cancer Neg Hx     Inflammatory bowel disease Neg Hx     Liver disease Neg Hx     Rectal cancer Neg Hx     Stomach cancer Neg Hx     Ulcerative colitis Neg Hx     Melanoma Neg Hx     Multiple sclerosis Neg Hx     Psoriasis Neg Hx     Amblyopia Neg Hx     Cataracts Neg Hx     Macular degeneration Neg Hx     Retinal detachment Neg Hx     Strabismus Neg Hx     Stroke Neg Hx     Thyroid disease Neg Hx        Objective     Vitals:    09/11/23 1419   BP: 120/74   Pulse: 80   Temp: 98.2 °F (36.8 °C)   TempSrc: Oral   SpO2: 98%   Weight: 92.9 kg (204 lb 12.9 oz)   Height: 5' 8" (1.727 m)   PainSc:   8     Body mass index is 31.14 kg/m².    Physical Exam  Vitals and nursing note reviewed.   Constitutional:       Appearance: She is obese.   HENT:      Head: Normocephalic.      Right Ear: Tympanic membrane, ear canal and external ear normal. There is no impacted cerumen.      Left Ear: Tympanic membrane, ear canal and external ear normal. There is no impacted cerumen.      Nose: Congestion and rhinorrhea present.      Mouth/Throat:      Mouth: Mucous membranes are moist.      Pharynx: Oropharynx is clear. No oropharyngeal exudate or posterior oropharyngeal erythema.      Comments: Clear PND noted on exam      Eyes:      Conjunctiva/sclera: Conjunctivae normal.   Cardiovascular:      Rate and Rhythm: Normal rate and regular rhythm.      Heart sounds: Normal heart sounds.   Pulmonary:      Effort: Pulmonary effort is normal.      Breath sounds: Normal breath sounds.   Musculoskeletal:         General: Normal range of motion.      Cervical back: Normal range of motion and neck supple.   Lymphadenopathy:      Cervical: No cervical adenopathy.   Skin:     General: Skin is warm and " dry.   Neurological:      General: No focal deficit present.      Mental Status: She is alert and oriented to person, place, and time.   Psychiatric:         Mood and Affect: Mood normal.         Behavior: Behavior normal.         Thought Content: Thought content normal.         Judgment: Judgment normal.            Assessment and Plan     1. Cough in adult  -     fluticasone propionate (FLONASE) 50 mcg/actuation nasal spray; Instill 2 sprays in each nostril once daily.  Dispense: 16 g; Refill: 1    2. Postnasal drip  -     benzonatate (TESSALON) 100 MG capsule; Take 1 capsule (100 mg total) by mouth 3 (three) times daily as needed for Cough.  Dispense: 30 capsule; Refill: 0    3. BMI 31.0-31.9,adult  BMI reviewed    4. Obesity (BMI 30-39.9)  BMI reviewed.    Diet and exercise to lose weight.    Meds as prescribed    Rest and Fluids, Tylenol or Motrin otc as needed for pain and or fever    Warm liquids to thin mucus    Allergen avoidance discussed, humidified air recommended    Warm salt water gargles as needed for throat pain    Nasal spray, taught how to correctly use           Follow up if symptoms worsen or fail to improve.

## 2023-09-15 ENCOUNTER — HOSPITAL ENCOUNTER (OUTPATIENT)
Facility: HOSPITAL | Age: 83
Discharge: HOME | End: 2023-09-17
Attending: EMERGENCY MEDICINE | Admitting: HOSPITALIST
Payer: MEDICARE

## 2023-09-15 ENCOUNTER — OFFICE VISIT (OUTPATIENT)
Dept: INTERNAL MEDICINE | Facility: CLINIC | Age: 83
End: 2023-09-15
Payer: MEDICARE

## 2023-09-15 VITALS
OXYGEN SATURATION: 96 % | TEMPERATURE: 100 F | BODY MASS INDEX: 30.71 KG/M2 | DIASTOLIC BLOOD PRESSURE: 80 MMHG | HEIGHT: 68 IN | HEART RATE: 90 BPM | WEIGHT: 202.63 LBS | SYSTOLIC BLOOD PRESSURE: 125 MMHG

## 2023-09-15 DIAGNOSIS — R53.1 WEAKNESS: Primary | ICD-10-CM

## 2023-09-15 DIAGNOSIS — R55 SYNCOPE: ICD-10-CM

## 2023-09-15 DIAGNOSIS — J18.9 PNEUMONIA DUE TO INFECTIOUS ORGANISM, UNSPECIFIED LATERALITY, UNSPECIFIED PART OF LUNG: Primary | ICD-10-CM

## 2023-09-15 DIAGNOSIS — Z78.9 DECREASED ACTIVITIES OF DAILY LIVING (ADL): ICD-10-CM

## 2023-09-15 DIAGNOSIS — R05.2 SUBACUTE COUGH: ICD-10-CM

## 2023-09-15 DIAGNOSIS — R06.02 SHORTNESS OF BREATH: ICD-10-CM

## 2023-09-15 LAB
ALBUMIN SERPL BCP-MCNC: 3 G/DL (ref 3.5–5.2)
ALP SERPL-CCNC: 104 U/L (ref 55–135)
ALT SERPL W/O P-5'-P-CCNC: 26 U/L (ref 10–44)
ANION GAP SERPL CALC-SCNC: 16 MMOL/L (ref 8–16)
AST SERPL-CCNC: 33 U/L (ref 10–40)
BACTERIA #/AREA URNS AUTO: NORMAL /HPF
BASOPHILS # BLD AUTO: 0.06 K/UL (ref 0–0.2)
BASOPHILS NFR BLD: 0.3 % (ref 0–1.9)
BILIRUB SERPL-MCNC: 0.5 MG/DL (ref 0.1–1)
BILIRUB UR QL STRIP: NEGATIVE
BNP SERPL-MCNC: 90 PG/ML (ref 0–99)
BUN SERPL-MCNC: 20 MG/DL (ref 8–23)
CALCIUM SERPL-MCNC: 9.9 MG/DL (ref 8.7–10.5)
CHLORIDE SERPL-SCNC: 100 MMOL/L (ref 95–110)
CLARITY UR REFRACT.AUTO: ABNORMAL
CO2 SERPL-SCNC: 19 MMOL/L (ref 23–29)
COLOR UR AUTO: YELLOW
CREAT SERPL-MCNC: 1.6 MG/DL (ref 0.5–1.4)
CREAT UR-MCNC: 224 MG/DL (ref 15–325)
DIFFERENTIAL METHOD: ABNORMAL
EOSINOPHIL # BLD AUTO: 0.5 K/UL (ref 0–0.5)
EOSINOPHIL NFR BLD: 2.6 % (ref 0–8)
ERYTHROCYTE [DISTWIDTH] IN BLOOD BY AUTOMATED COUNT: 15.1 % (ref 11.5–14.5)
EST. GFR  (NO RACE VARIABLE): 31.8 ML/MIN/1.73 M^2
GLUCOSE SERPL-MCNC: 134 MG/DL (ref 70–110)
GLUCOSE UR QL STRIP: NEGATIVE
HCT VFR BLD AUTO: 35.6 % (ref 37–48.5)
HGB BLD-MCNC: 11.8 G/DL (ref 12–16)
HGB UR QL STRIP: NEGATIVE
IMM GRANULOCYTES # BLD AUTO: 0.14 K/UL (ref 0–0.04)
IMM GRANULOCYTES NFR BLD AUTO: 0.8 % (ref 0–0.5)
KETONES UR QL STRIP: NEGATIVE
LACTATE SERPL-SCNC: 1.3 MMOL/L (ref 0.5–2.2)
LEUKOCYTE ESTERASE UR QL STRIP: ABNORMAL
LYMPHOCYTES # BLD AUTO: 2.4 K/UL (ref 1–4.8)
LYMPHOCYTES NFR BLD: 12.9 % (ref 18–48)
MAGNESIUM SERPL-MCNC: 1.5 MG/DL (ref 1.6–2.6)
MCH RBC QN AUTO: 26.5 PG (ref 27–31)
MCHC RBC AUTO-ENTMCNC: 33.1 G/DL (ref 32–36)
MCV RBC AUTO: 80 FL (ref 82–98)
MICROSCOPIC COMMENT: NORMAL
MONOCYTES # BLD AUTO: 1.6 K/UL (ref 0.3–1)
MONOCYTES NFR BLD: 8.5 % (ref 4–15)
NEUTROPHILS # BLD AUTO: 13.9 K/UL (ref 1.8–7.7)
NEUTROPHILS NFR BLD: 74.9 % (ref 38–73)
NITRITE UR QL STRIP: NEGATIVE
NRBC BLD-RTO: 0 /100 WBC
PH UR STRIP: 6 [PH] (ref 5–8)
PLATELET # BLD AUTO: 304 K/UL (ref 150–450)
PMV BLD AUTO: 9.6 FL (ref 9.2–12.9)
POCT GLUCOSE: 94 MG/DL (ref 70–110)
POCT GLUCOSE: 94 MG/DL (ref 70–110)
POTASSIUM SERPL-SCNC: 3 MMOL/L (ref 3.5–5.1)
PROCALCITONIN SERPL IA-MCNC: 0.32 NG/ML
PROT SERPL-MCNC: 8 G/DL (ref 6–8.4)
PROT UR QL STRIP: ABNORMAL
RBC # BLD AUTO: 4.46 M/UL (ref 4–5.4)
RBC #/AREA URNS AUTO: 3 /HPF (ref 0–4)
SARS-COV-2 RDRP RESP QL NAA+PROBE: NEGATIVE
SODIUM SERPL-SCNC: 135 MMOL/L (ref 136–145)
SODIUM UR-SCNC: 29 MMOL/L (ref 20–250)
SP GR UR STRIP: 1.02 (ref 1–1.03)
SQUAMOUS #/AREA URNS AUTO: 7 /HPF
TROPONIN I SERPL DL<=0.01 NG/ML-MCNC: 0.01 NG/ML (ref 0–0.03)
URN SPEC COLLECT METH UR: ABNORMAL
WBC # BLD AUTO: 18.6 K/UL (ref 3.9–12.7)
WBC #/AREA URNS AUTO: 2 /HPF (ref 0–5)

## 2023-09-15 PROCEDURE — G0378 HOSPITAL OBSERVATION PER HR: HCPCS | Mod: HCNC

## 2023-09-15 PROCEDURE — 83735 ASSAY OF MAGNESIUM: CPT | Mod: HCNC | Performed by: EMERGENCY MEDICINE

## 2023-09-15 PROCEDURE — 3288F PR FALLS RISK ASSESSMENT DOCUMENTED: ICD-10-PCS | Mod: HCNC,CPTII,S$GLB, | Performed by: INTERNAL MEDICINE

## 2023-09-15 PROCEDURE — 25000003 PHARM REV CODE 250: Mod: HCNC | Performed by: EMERGENCY MEDICINE

## 2023-09-15 PROCEDURE — 99213 OFFICE O/P EST LOW 20 MIN: CPT | Mod: HCNC,S$GLB,, | Performed by: INTERNAL MEDICINE

## 2023-09-15 PROCEDURE — 99285 EMERGENCY DEPT VISIT HI MDM: CPT | Mod: 25

## 2023-09-15 PROCEDURE — 93010 EKG 12-LEAD: ICD-10-PCS | Mod: HCNC,,, | Performed by: INTERNAL MEDICINE

## 2023-09-15 PROCEDURE — 99213 PR OFFICE/OUTPT VISIT, EST, LEVL III, 20-29 MIN: ICD-10-PCS | Mod: HCNC,S$GLB,, | Performed by: INTERNAL MEDICINE

## 2023-09-15 PROCEDURE — 84300 ASSAY OF URINE SODIUM: CPT | Mod: HCNC | Performed by: HOSPITALIST

## 2023-09-15 PROCEDURE — 81001 URINALYSIS AUTO W/SCOPE: CPT | Mod: HCNC | Performed by: EMERGENCY MEDICINE

## 2023-09-15 PROCEDURE — 1125F PR PAIN SEVERITY QUANTIFIED, PAIN PRESENT: ICD-10-PCS | Mod: HCNC,CPTII,S$GLB, | Performed by: INTERNAL MEDICINE

## 2023-09-15 PROCEDURE — 63600175 PHARM REV CODE 636 W HCPCS: Mod: HCNC | Performed by: EMERGENCY MEDICINE

## 2023-09-15 PROCEDURE — 87040 BLOOD CULTURE FOR BACTERIA: CPT | Mod: HCNC | Performed by: EMERGENCY MEDICINE

## 2023-09-15 PROCEDURE — 3288F FALL RISK ASSESSMENT DOCD: CPT | Mod: HCNC,CPTII,S$GLB, | Performed by: INTERNAL MEDICINE

## 2023-09-15 PROCEDURE — 84484 ASSAY OF TROPONIN QUANT: CPT | Mod: HCNC | Performed by: EMERGENCY MEDICINE

## 2023-09-15 PROCEDURE — 96367 TX/PROPH/DG ADDL SEQ IV INF: CPT

## 2023-09-15 PROCEDURE — 93005 ELECTROCARDIOGRAM TRACING: CPT | Mod: HCNC

## 2023-09-15 PROCEDURE — 99999 PR PBB SHADOW E&M-EST. PATIENT-LVL III: CPT | Mod: PBBFAC,HCNC,, | Performed by: INTERNAL MEDICINE

## 2023-09-15 PROCEDURE — 83880 ASSAY OF NATRIURETIC PEPTIDE: CPT | Mod: HCNC | Performed by: EMERGENCY MEDICINE

## 2023-09-15 PROCEDURE — 82962 GLUCOSE BLOOD TEST: CPT

## 2023-09-15 PROCEDURE — 1101F PR PT FALLS ASSESS DOC 0-1 FALLS W/OUT INJ PAST YR: ICD-10-PCS | Mod: HCNC,CPTII,S$GLB, | Performed by: INTERNAL MEDICINE

## 2023-09-15 PROCEDURE — 25000003 PHARM REV CODE 250: Mod: HCNC | Performed by: HOSPITALIST

## 2023-09-15 PROCEDURE — 99999 PR PBB SHADOW E&M-EST. PATIENT-LVL III: ICD-10-PCS | Mod: PBBFAC,HCNC,, | Performed by: INTERNAL MEDICINE

## 2023-09-15 PROCEDURE — 3079F DIAST BP 80-89 MM HG: CPT | Mod: HCNC,CPTII,S$GLB, | Performed by: INTERNAL MEDICINE

## 2023-09-15 PROCEDURE — 3074F SYST BP LT 130 MM HG: CPT | Mod: HCNC,CPTII,S$GLB, | Performed by: INTERNAL MEDICINE

## 2023-09-15 PROCEDURE — U0002 COVID-19 LAB TEST NON-CDC: HCPCS | Mod: HCNC | Performed by: EMERGENCY MEDICINE

## 2023-09-15 PROCEDURE — 83605 ASSAY OF LACTIC ACID: CPT | Mod: HCNC | Performed by: HOSPITALIST

## 2023-09-15 PROCEDURE — 99222 PR INITIAL HOSPITAL CARE,LEVL II: ICD-10-PCS | Mod: AI,HCNC,, | Performed by: HOSPITALIST

## 2023-09-15 PROCEDURE — 80053 COMPREHEN METABOLIC PANEL: CPT | Mod: HCNC | Performed by: EMERGENCY MEDICINE

## 2023-09-15 PROCEDURE — 3074F PR MOST RECENT SYSTOLIC BLOOD PRESSURE < 130 MM HG: ICD-10-PCS | Mod: HCNC,CPTII,S$GLB, | Performed by: INTERNAL MEDICINE

## 2023-09-15 PROCEDURE — 1101F PT FALLS ASSESS-DOCD LE1/YR: CPT | Mod: HCNC,CPTII,S$GLB, | Performed by: INTERNAL MEDICINE

## 2023-09-15 PROCEDURE — 99222 1ST HOSP IP/OBS MODERATE 55: CPT | Mod: AI,HCNC,, | Performed by: HOSPITALIST

## 2023-09-15 PROCEDURE — 82570 ASSAY OF URINE CREATININE: CPT | Mod: HCNC | Performed by: HOSPITALIST

## 2023-09-15 PROCEDURE — 96365 THER/PROPH/DIAG IV INF INIT: CPT

## 2023-09-15 PROCEDURE — 84145 PROCALCITONIN (PCT): CPT | Mod: HCNC | Performed by: HOSPITALIST

## 2023-09-15 PROCEDURE — 96361 HYDRATE IV INFUSION ADD-ON: CPT

## 2023-09-15 PROCEDURE — 1125F AMNT PAIN NOTED PAIN PRSNT: CPT | Mod: HCNC,CPTII,S$GLB, | Performed by: INTERNAL MEDICINE

## 2023-09-15 PROCEDURE — 63600175 PHARM REV CODE 636 W HCPCS: Mod: HCNC | Performed by: HOSPITALIST

## 2023-09-15 PROCEDURE — 3079F PR MOST RECENT DIASTOLIC BLOOD PRESSURE 80-89 MM HG: ICD-10-PCS | Mod: HCNC,CPTII,S$GLB, | Performed by: INTERNAL MEDICINE

## 2023-09-15 PROCEDURE — 93010 ELECTROCARDIOGRAM REPORT: CPT | Mod: HCNC,,, | Performed by: INTERNAL MEDICINE

## 2023-09-15 PROCEDURE — 85025 COMPLETE CBC W/AUTO DIFF WBC: CPT | Mod: HCNC | Performed by: EMERGENCY MEDICINE

## 2023-09-15 RX ORDER — IBUPROFEN 200 MG
24 TABLET ORAL
Status: DISCONTINUED | OUTPATIENT
Start: 2023-09-15 | End: 2023-09-17

## 2023-09-15 RX ORDER — ACETAMINOPHEN 325 MG/1
650 TABLET ORAL EVERY 4 HOURS PRN
Status: DISCONTINUED | OUTPATIENT
Start: 2023-09-15 | End: 2023-09-17 | Stop reason: HOSPADM

## 2023-09-15 RX ORDER — AMLODIPINE BESYLATE 10 MG/1
10 TABLET ORAL NIGHTLY
Status: DISCONTINUED | OUTPATIENT
Start: 2023-09-15 | End: 2023-09-17 | Stop reason: HOSPADM

## 2023-09-15 RX ORDER — AMLODIPINE BESYLATE 10 MG/1
10 TABLET ORAL
Status: COMPLETED | OUTPATIENT
Start: 2023-09-15 | End: 2023-09-15

## 2023-09-15 RX ORDER — ROPINIROLE 0.25 MG/1
0.5 TABLET, FILM COATED ORAL NIGHTLY
Status: DISCONTINUED | OUTPATIENT
Start: 2023-09-15 | End: 2023-09-17 | Stop reason: HOSPADM

## 2023-09-15 RX ORDER — NALOXONE HCL 0.4 MG/ML
0.02 VIAL (ML) INJECTION
Status: DISCONTINUED | OUTPATIENT
Start: 2023-09-15 | End: 2023-09-17 | Stop reason: HOSPADM

## 2023-09-15 RX ORDER — CARVEDILOL 12.5 MG/1
25 TABLET ORAL
Status: COMPLETED | OUTPATIENT
Start: 2023-09-15 | End: 2023-09-15

## 2023-09-15 RX ORDER — IPRATROPIUM BROMIDE 0.5 MG/2.5ML
500 SOLUTION RESPIRATORY (INHALATION) 4 TIMES DAILY
Qty: 75 ML | Refills: 0 | Status: SHIPPED | OUTPATIENT
Start: 2023-09-15 | End: 2023-09-15

## 2023-09-15 RX ORDER — SODIUM CHLORIDE 0.9 % (FLUSH) 0.9 %
10 SYRINGE (ML) INJECTION
Status: DISCONTINUED | OUTPATIENT
Start: 2023-09-15 | End: 2023-09-17 | Stop reason: HOSPADM

## 2023-09-15 RX ORDER — ATORVASTATIN CALCIUM 40 MG/1
40 TABLET, FILM COATED ORAL DAILY
Status: DISCONTINUED | OUTPATIENT
Start: 2023-09-16 | End: 2023-09-17 | Stop reason: HOSPADM

## 2023-09-15 RX ORDER — IBUPROFEN 200 MG
24 TABLET ORAL
Status: DISCONTINUED | OUTPATIENT
Start: 2023-09-15 | End: 2023-09-17 | Stop reason: HOSPADM

## 2023-09-15 RX ORDER — GUAIFENESIN 600 MG/1
600 TABLET, EXTENDED RELEASE ORAL 2 TIMES DAILY
Status: DISCONTINUED | OUTPATIENT
Start: 2023-09-15 | End: 2023-09-17 | Stop reason: HOSPADM

## 2023-09-15 RX ORDER — AMOXICILLIN AND CLAVULANATE POTASSIUM 875; 125 MG/1; MG/1
1 TABLET, FILM COATED ORAL EVERY 12 HOURS
Qty: 14 TABLET | Refills: 0 | Status: SHIPPED | OUTPATIENT
Start: 2023-09-15 | End: 2023-09-15

## 2023-09-15 RX ORDER — DULOXETIN HYDROCHLORIDE 60 MG/1
60 CAPSULE, DELAYED RELEASE ORAL 2 TIMES DAILY
Status: DISCONTINUED | OUTPATIENT
Start: 2023-09-15 | End: 2023-09-17 | Stop reason: HOSPADM

## 2023-09-15 RX ORDER — AZITHROMYCIN 250 MG/1
TABLET, FILM COATED ORAL
Qty: 6 TABLET | Refills: 0 | Status: SHIPPED | OUTPATIENT
Start: 2023-09-15 | End: 2023-09-15

## 2023-09-15 RX ORDER — TALC
6 POWDER (GRAM) TOPICAL NIGHTLY PRN
Status: DISCONTINUED | OUTPATIENT
Start: 2023-09-15 | End: 2023-09-17 | Stop reason: HOSPADM

## 2023-09-15 RX ORDER — PANTOPRAZOLE SODIUM 40 MG/1
40 TABLET, DELAYED RELEASE ORAL DAILY
Status: DISCONTINUED | OUTPATIENT
Start: 2023-09-16 | End: 2023-09-17 | Stop reason: HOSPADM

## 2023-09-15 RX ORDER — IPRATROPIUM BROMIDE AND ALBUTEROL SULFATE 2.5; .5 MG/3ML; MG/3ML
3 SOLUTION RESPIRATORY (INHALATION)
Status: DISCONTINUED | OUTPATIENT
Start: 2023-09-15 | End: 2023-09-15

## 2023-09-15 RX ORDER — ONDANSETRON 2 MG/ML
4 INJECTION INTRAMUSCULAR; INTRAVENOUS EVERY 8 HOURS PRN
Status: DISCONTINUED | OUTPATIENT
Start: 2023-09-15 | End: 2023-09-17 | Stop reason: HOSPADM

## 2023-09-15 RX ORDER — ENOXAPARIN SODIUM 100 MG/ML
40 INJECTION SUBCUTANEOUS EVERY 24 HOURS
Status: DISCONTINUED | OUTPATIENT
Start: 2023-09-15 | End: 2023-09-15

## 2023-09-15 RX ORDER — GLUCAGON 1 MG
1 KIT INJECTION
Status: DISCONTINUED | OUTPATIENT
Start: 2023-09-15 | End: 2023-09-17 | Stop reason: HOSPADM

## 2023-09-15 RX ORDER — IBUPROFEN 200 MG
16 TABLET ORAL
Status: DISCONTINUED | OUTPATIENT
Start: 2023-09-15 | End: 2023-09-17

## 2023-09-15 RX ORDER — PROCHLORPERAZINE EDISYLATE 5 MG/ML
5 INJECTION INTRAMUSCULAR; INTRAVENOUS EVERY 6 HOURS PRN
Status: DISCONTINUED | OUTPATIENT
Start: 2023-09-15 | End: 2023-09-17 | Stop reason: HOSPADM

## 2023-09-15 RX ORDER — IBUPROFEN 200 MG
16 TABLET ORAL
Status: DISCONTINUED | OUTPATIENT
Start: 2023-09-15 | End: 2023-09-17 | Stop reason: HOSPADM

## 2023-09-15 RX ORDER — CARVEDILOL 25 MG/1
25 TABLET ORAL 2 TIMES DAILY
Status: DISCONTINUED | OUTPATIENT
Start: 2023-09-15 | End: 2023-09-17 | Stop reason: HOSPADM

## 2023-09-15 RX ORDER — INSULIN ASPART 100 [IU]/ML
0-10 INJECTION, SOLUTION INTRAVENOUS; SUBCUTANEOUS
Status: DISCONTINUED | OUTPATIENT
Start: 2023-09-15 | End: 2023-09-17 | Stop reason: HOSPADM

## 2023-09-15 RX ADMIN — CARVEDILOL 25 MG: 12.5 TABLET, FILM COATED ORAL at 08:09

## 2023-09-15 RX ADMIN — APIXABAN 5 MG: 5 TABLET, FILM COATED ORAL at 10:09

## 2023-09-15 RX ADMIN — POTASSIUM BICARBONATE 40 MEQ: 391 TABLET, EFFERVESCENT ORAL at 08:09

## 2023-09-15 RX ADMIN — DULOXETINE HYDROCHLORIDE 60 MG: 60 CAPSULE, DELAYED RELEASE ORAL at 10:09

## 2023-09-15 RX ADMIN — SODIUM CHLORIDE, POTASSIUM CHLORIDE, SODIUM LACTATE AND CALCIUM CHLORIDE 500 ML: 600; 310; 30; 20 INJECTION, SOLUTION INTRAVENOUS at 08:09

## 2023-09-15 RX ADMIN — ROPINIROLE HYDROCHLORIDE 0.5 MG: 0.25 TABLET, FILM COATED ORAL at 10:09

## 2023-09-15 RX ADMIN — GUAIFENESIN 600 MG: 600 TABLET, EXTENDED RELEASE ORAL at 10:09

## 2023-09-15 RX ADMIN — CEFTRIAXONE 1 G: 1 INJECTION, POWDER, FOR SOLUTION INTRAMUSCULAR; INTRAVENOUS at 08:09

## 2023-09-15 RX ADMIN — AZITHROMYCIN DIHYDRATE 500 MG: 500 INJECTION, POWDER, LYOPHILIZED, FOR SOLUTION INTRAVENOUS at 10:09

## 2023-09-15 RX ADMIN — AMLODIPINE BESYLATE 10 MG: 10 TABLET ORAL at 08:09

## 2023-09-15 NOTE — ASSESSMENT & PLAN NOTE
-A1c 6.4, well-controlled. Home regimen lantus 40 units, aspart 15 units TIDWM and trulicity Q7 days  -PLan to use levemir 25 units daily and SSI while admitted, titrate up as needed

## 2023-09-15 NOTE — ED NOTES
Patient identifiers for Henrietta Villasenor 83 y.o. female checked and correct.  Chief Complaint   Patient presents with    Multiple complaints      SOB, weakness, and cough, multiple falls due to weakness coming from clinic     Past Medical History:   Diagnosis Date    Abnormality of lung 11/08/2011    Stable bandlike opacities at the lung bases, most likely representing      Anxiety     Arthritis     CAD (coronary artery disease)     Calculus of ureter 2/22/2011    CHF (congestive heart failure)     Chronic back pain 7/29/2012    Colon polyp 9/2010; 11/2010    Colon polyps 7/29/2012    Coronary artery disease involving native coronary artery of native heart with angina pectoris     Depression     Diabetes mellitus     Diabetes mellitus type II     Diverticulitis large intestine 7/27/2015    Diverticulitis of large intestine without perforation or abscess without bleeding     Diverticulosis 09/25/2010; 11/02/2010; 11/08/2011; 7/29/2012    Duodenal disorder 08/25/2011    Duodenal erosion noted on EGD.    Duodenal ulcer, unspecified as acute or chronic, without hemorrhage, perforation, or obstruction 8/24/2011    E. coli sepsis 12/2010    Due to left ureteral stone with left nephrostomy tube - hospitalized in [place]    Esophageal dysmotility 01/24/2012    Noted on upper GI-barium swallow.    Facial weakness 1969    Left facial weakness s/p left mastoidectomy in ~ 1969.    Fatty liver 11/08/2011    Reported on CT-abdomen and in 06/2012 Gastro clinic visit note.    Gastric polyp 09/29/2010    GERD (gastroesophageal reflux disease) 7/29/2012    Hepatomegaly 11/08/2011    Reported on CT-abdomen    Herpes zoster with other nervous system complications(053.19) 2/28/2011    Hiatal hernia 06/26/2006; 09/29/2010; 08/25/2011    Noted on barium swallow 2006; noted on  EGD 2011.    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    Trouble in sleeping     Type II or unspecified type diabetes mellitus with neurological manifestations, not stated as uncontrolled(250.60)     Type II or unspecified type diabetes mellitus with peripheral circulatory disorders, not stated as uncontrolled(250.70)     Type II or unspecified type diabetes mellitus with renal manifestations, not stated as uncontrolled(250.40)      Allergies reported:   Review of patient's allergies indicates:   Allergen Reactions    Crestor [rosuvastatin]      Cramping in legs    Ezetimibe Diarrhea     Other reaction(s): abdominal pain, Diarrhea      Hydrocodone Itching    Lisinopril      Other reaction(s): cough      Sulfa (sulfonamide antibiotics) Itching and Rash           Sulfamethoxazole-trimethoprim     Valsartan Swelling         LOC: Patient is awake, alert, and aware of environment with an appropriate affect. Patient is oriented x 4 and speaking appropriately.  APPEARANCE: Patient resting comfortably and in no acute distress. Patient is clean and well groomed, patient's clothing is properly fastened.  HEENT: - JVD, + midline trach  SKIN: The skin is warm and dry. Patient has normal skin turgor and moist mucus membranes.   MUSKULOSKELETAL: Patient is moving all extremities well, no obvious deformities noted. Pulses intact. PMS x 4  RESPIRATORY: Airway is  open and patent. Respirations are spontaneous and non-labored with normal effort and rate. + productive cough.  CARDIAC: Patient has a normal rate and rhythm. 78 on cardiac monitor. + 2 bilateral pedal and radial pulses, < 3 s cap refill.  ABDOMEN: No distention noted. Soft and tender to LLQ upon palpation. Pain in upper quadrants when coughing.  NEUROLOGICAL: pupils 4 mm, PERRL. Facial expression is symmetrical. Hand grasps are equal bilaterally. Normal sensation in all extremities when touched with finger.

## 2023-09-15 NOTE — HPI
82 yo F with PMHx DM2, CKD3, HTN, HFpEF who presents to the ED from IM clinic today for worsening weakness and SOB x 2-3 days. Pt. Reports that she developed first symptoms of a productive cough about 2 weeks ago. Cough initially dry but has progressed to involve green mucus. Pt. Was seen in an urgent care for this cough and prescriebd tessalon, but the cough did not improve. Over the last 2-3 days, the patient has developed significant fatigue and SOB which concerned her. Pt. Lives alone, and She has had difficulty getting around her house. She also reports a couple of falls. Yesterday, she reports developing some chills as well. She scheduled an appointment with her doctor today, and had wheezing/significant increased work of breathing on exam, so she was referred to the ED for further work-up.

## 2023-09-15 NOTE — PROVIDER PROGRESS NOTES - EMERGENCY DEPT.
Encounter Date: 9/15/2023    ED Physician Progress Notes         EKG - STEMI Decision  Initial Reading: No STEMI present.

## 2023-09-15 NOTE — PROGRESS NOTES
Subjective:       Patient ID: Henrietta Villasenor is a 83 y.o. female.    Chief Complaint: Cough and Nasal Congestion    Presents for evaluation of cough and shortness of breath.     States she started having the cough two weeks ago. Went to urgent care on Monday and was given Flonase and benzonate but symptoms have worsened over the last few days. Feels more short of breath. Cough has become more productive of green colored mucus.     Has also had increased weakness in the last two days. She had two falls at home since yesterday. States she just woke up on the bathroom floor after fainting yesterday.    Has had decreased appetite and decreased PO intake the last 2-3 days.     Lives alone but has children who check on her.     Cough  Associated symptoms include chills, shortness of breath and wheezing. Pertinent negatives include no chest pain, ear pain, headaches or myalgias.     Review of Systems   Constitutional:  Positive for chills and fatigue. Negative for activity change and appetite change.   HENT:  Negative for ear pain, sinus pressure/congestion and sneezing.    Respiratory:  Positive for cough, chest tightness, shortness of breath and wheezing.    Cardiovascular:  Negative for chest pain, palpitations and leg swelling.   Gastrointestinal:  Negative for abdominal distention, abdominal pain, constipation, diarrhea, nausea and vomiting.   Genitourinary:  Negative for dysuria and hematuria.   Musculoskeletal:  Negative for arthralgias, back pain and myalgias.   Neurological:  Positive for weakness. Negative for dizziness and headaches.   Psychiatric/Behavioral:  Negative for agitation. The patient is not nervous/anxious.            Past Medical History:   Diagnosis Date    Abnormality of lung 11/08/2011    Stable bandlike opacities at the lung bases, most likely representing      Anxiety     Arthritis     CAD (coronary artery disease)     Calculus of ureter 2/22/2011    CHF (congestive heart failure)     Chronic  back pain 7/29/2012    Colon polyp 9/2010; 11/2010    Colon polyps 7/29/2012    Coronary artery disease involving native coronary artery of native heart with angina pectoris     Depression     Diabetes mellitus     Diabetes mellitus type II     Diverticulitis large intestine 7/27/2015    Diverticulitis of large intestine without perforation or abscess without bleeding     Diverticulosis 09/25/2010; 11/02/2010; 11/08/2011; 7/29/2012    Duodenal disorder 08/25/2011    Duodenal erosion noted on EGD.    Duodenal ulcer, unspecified as acute or chronic, without hemorrhage, perforation, or obstruction 8/24/2011    E. coli sepsis 12/2010    Due to left ureteral stone with left nephrostomy tube - hospitalized in [place]    Esophageal dysmotility 01/24/2012    Noted on upper GI-barium swallow.    Facial weakness 1969    Left facial weakness s/p left mastoidectomy in ~ 1969.    Fatty liver 11/08/2011    Reported on CT-abdomen and in 06/2012 Gastro clinic visit note.    Gastric polyp 09/29/2010    GERD (gastroesophageal reflux disease) 7/29/2012    Hepatomegaly 11/08/2011    Reported on CT-abdomen    Herpes zoster with other nervous system complications(053.19) 2/28/2011    Hiatal hernia 06/26/2006; 09/29/2010; 08/25/2011    Noted on barium swallow 2006; noted on  EGD 2011.    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    Trouble in sleeping     Type II or unspecified type diabetes mellitus with neurological manifestations, not stated as uncontrolled(250.60)     Type II or unspecified type diabetes mellitus with peripheral circulatory disorders, not stated as uncontrolled(250.70)     Type II or unspecified type diabetes mellitus with renal manifestations, not stated as uncontrolled(250.40)      Past Surgical History:   Procedure Laterality Date    BELPHAROPTOSIS REPAIR      s/p LAMBERTO levator repair - Dr. Dejesus    CARDIAC CATHETERIZATION      CARPAL TUNNEL RELEASE  3/13/2012    CATARACT EXTRACTION W/  INTRAOCULAR LENS IMPLANT Right 10/31/2018        CATARACT EXTRACTION W/  INTRAOCULAR LENS IMPLANT Left 03/22/2018        CHOLECYSTECTOMY      COLONOSCOPY N/A 11/16/2016    Procedure: COLONOSCOPY;  Surgeon: Chris Storey MD;  Location: [facility] (4TH FLR);  Service: Endoscopy;  Laterality: N/A;    COLONOSCOPY N/A 6/14/2017    Procedure: COLONOSCOPY;  Surgeon: Chris Storey MD;  Location: [facility] ENDO (4TH FLR);  Service: Endoscopy;  Laterality: N/A;  colonoscopy in 3 months with better bowel prep. - Split PEG prep ordered    CYSTOSCOPY  4/19/2023    Procedure: CYSTOSCOPY;  Surgeon: Domo Bates MD;  [facility];  Service: Urology;;    CYSTOSCOPY N/A 5/12/2023    Procedure: CYSTOSCOPY;  Surgeon: Laz Rudolph Jr., MD;  [facility];  Service: Urology;  Laterality: N/A;    CYSTOSCOPY N/A 7/7/2023    Procedure: CYSTOSCOPY;  Surgeon: Laz Rudolph Jr., MD;  [facility];  Service: Urology;  Laterality: N/A;    ESOPHAGOGASTRODUODENOSCOPY N/A 11/10/2021    Procedure: EGD (ESOPHAGOGASTRODUODENOSCOPY);  Surgeon: Kuldeep Velázquez MD;  Location: [facility] ENDO (4TH FLR);  Service: Endoscopy;  Laterality: N/A;  11/4-eliquis hold ok see te -tb-fully vacc-inst mail  and verbal-    EXTRACORPOREAL SHOCK WAVE LITHOTRIPSY      EXTRACTION - STONE Right 5/12/2023    Procedure: EXTRACTION - STONE;  Surgeon: Laz Rudolph Jr., MD;  [facility];  Service: Urology;  Laterality: Right;    INJECTION OF ANESTHETIC AGENT AROUND NERVE Bilateral 6/24/2020    Procedure: BLOCK, NERVE, C4-C5-C6 MEDIAL BRANCH ok per Talia to add on;  Surgeon: Oscar Ravi MD;  Location: [facility] PAIN MGT;  Service: Pain Management;  Laterality: Bilateral;    INTRAOCULAR PROSTHESES INSERTION Right 10/31/2018    Procedure: INSERTION-INTRAOCULAR LENS (IOL);  Surgeon: Keysha Valle MD;  Location: [facility] OR;  Service: Ophthalmology;  Laterality: Right;    JOINT REPLACEMENT      LASER LITHOTRIPSY Right 5/12/2023    Procedure: LITHOTRIPSY, USING LASER;  Surgeon: Laz Rudolph Jr., MD;  Location: [facility] OR [address] [place] [address];  Service: Urology;  Laterality: Right;    LASER LITHOTRIPSY Right 7/7/2023    Procedure: LITHOTRIPSY, USING LASER;  Surgeon: Laz Rudolph Jr., MD;  Location: [facility] OR [address] [place] [address];  Service: Urology;  Laterality: Right;    mastoid tumor removal  1969    Left mastoidectomy with residual left facial weakness.    NEPHROSTOMY      OOPHORECTOMY      PHACOEMULSIFICATION OF CATARACT Right 10/31/2018    Procedure: PHACOEMULSIFICATION-ASPIRATION-CATARACT;  Surgeon: Keysha Valle MD;  Location: [facility] OR;  Service: Ophthalmology;  Laterality: Right;    PYELOSCOPY Right 5/12/2023    Procedure: PYELOSCOPY;  Surgeon: Laz Rudolph Jr., MD;  Location: [facility] OR [address] [place] [address];  Service: Urology;  Laterality: Right;    PYELOSCOPY Right 7/7/2023    Procedure: PYELOSCOPY;  Surgeon: Laz Rudolph Jr., MD;  Location: [facility] OR [address] [place] [address];  Service: Urology;  Laterality: Right;    REMOVAL, STENT, URETER Right 5/12/2023    Procedure: REMOVAL, STENT, URETER;  Surgeon: Laz Rudolph Jr., MD;  [facility];  Service: Urology;  Laterality: Right;    REMOVAL, STENT, URETER Right 7/7/2023    Procedure: REMOVAL, STENT,  URETER;  Surgeon: Laz Rudolph Jr., MD;  Location: [facility] OR 1ST FLR;  Service: Urology;  Laterality: Right;    RETROGRADE PYELOGRAPHY Right 4/19/2023    Procedure: PYELOGRAM, RETROGRADE;  Surgeon: Domo Bates MD;  Location: [facility] OR 1ST FLR;  Service: Urology;  Laterality: Right;    TOTAL ABDOMINAL HYSTERECTOMY  1969    TAHBSO    TOTAL KNEE ARTHROPLASTY  9/13/2012    Left    TRANSFORAMINAL EPIDURAL INJECTION OF STEROID Bilateral 9/17/2019    Procedure: INJECTION, STEROID, EPIDURAL, TRANSFORAMINAL APPROACH;  Surgeon: Oscar Ravi MD;  Location: [facility] PAIN [facility]GT;  Service: Pain Management;  Laterality: Bilateral;  B/L TFESI L4/L5    TRANSFORAMINAL EPIDURAL INJECTION OF STEROID Bilateral 10/2/2020    Procedure: INJECTION, STEROID, EPIDURAL, TRANSFORAMINAL APPROACH;  Surgeon: Oscar Ravi MD;  Location: [facility] PAIN MGT;  Service: Pain Management;  Laterality: Bilateral;  B/L TFESI L4/5    TRANSFORAMINAL EPIDURAL INJECTION OF STEROID Bilateral 5/7/2021    Procedure: INJECTION, STEROID, EPIDURAL, TRANSFORAMINAL APPROACH, L4-L5 clear to hold Eliquis 3 days;  Surgeon: Oscar Ravi MD;  Location: [facility] PAIN [facility]GT;  Service: Pain Management;  Laterality: Bilateral;    URETERAL STENT PLACEMENT Right 4/19/2023    Procedure: INSERTION, STENT, URETER;  Surgeon: Domo Bates MD;  Location: [facility] OR 1ST FLR;  Service: Urology;  Laterality: Right;    URETERAL STENT PLACEMENT N/A 5/12/2023    Procedure: INSERTION, STENT, URETER;  Surgeon: Laz Rudolph Jr., MD;  Location: [facility] OR 1ST FLR;  Service: Urology;  Laterality: N/A;    URETERAL STENT PLACEMENT Right 7/7/2023    Procedure: INSERTION, STENT, URETER;  Surgeon: Laz Rudolph Jr., MD;  Location: [facility] OR 1ST FLR;  Service: Urology;  Laterality: Right;    URETEROSCOPY Right 5/12/2023    Procedure: URETEROSCOPY;  Surgeon: Laz Rudolph Jr., MD;  Location: [facility] OR 1ST FLR;  Service: Urology;  Laterality: Right;    URETEROSCOPY Right 7/7/2023    Procedure:  URETEROSCOPY;  Surgeon: Laz Rudolph Jr., MD;  Location: [facility] OR [address] [place] [address];  Service: Urology;  Laterality: Right;      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    Neuralgia, neuritis, and radiculitis, unspecified    Abdominal pain, unspecified site    Ureteral calculus    Dermatitis due to drugs and medicines taken internally(693.0)    Dermatochalasis    Personal history of allergy to sulfonamides    Diastolic congestive heart failure    Knee pain    Anemia    Chronic right ear pain    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    Hearing loss, sensorineural    Stage 3a chronic kidney disease    Coronary artery disease involving native coronary artery of native heart with angina pectoris    Lactose intolerance    Allergic rhinitis    Hyperlipidemia    Aortic atherosclerosis    Spinal enthesopathy of lumbar region    LLQ abdominal pain    Diverticulitis of large intestine without perforation or abscess without bleeding    Esophageal dysphagia    Fatty liver    Painful swallowing    Left facial numbness    Neck pain on left side    Type 2 diabetes mellitus, with long-term current use of insulin    Shortness of breath    Diabetic neuropathy associated with type 2 diabetes mellitus    Type 2 diabetes mellitus with stage 3 chronic kidney disease, with long-term current use of insulin    Obesity (BMI 30.0-34.9)    Low hemoglobin and low hematocrit    History of colon polyps    Type 2 diabetes mellitus with diabetic polyneuropathy, with long-term current use of insulin    Hyperparathyroidism, unspecified    Obesity, diabetes, and hypertension syndrome    Spinal stenosis of lumbar region with neurogenic claudication    Benign paroxysmal positional vertigo due to  bilateral vestibular disorder    Tortuous aorta    Ectatic aorta    Insulin long-term use    Facet arthropathy    Mood disorder    Type 2 diabetes mellitus with hyperglycemia    Hypercholesterolemia    Hypovitaminosis D    Research study patient - TACT2 EDTA Chelation Study    History of MI (myocardial infarction)    Facial nerve palsy, secondary    Gout, arthritis    Vitreomacular adhesion, right    Epiretinal membrane, left    Chronic pain    Decreased ROM of lumbar spine    Weakness    Abnormality of gait and mobility    Decreased functional activity tolerance    Chest pain    Postmenopausal    Osteopenia    Thyroid nodule    Bilateral leg edema    Neuropathic pain    Hematoma of arm, left, initial encounter    Deep vein thrombosis (DVT) of popliteal vein of both lower extremities    PAD (peripheral artery disease)    Memory loss    Venous insufficiency of both lower extremities    Chronic pain disorder    Decreased activities of daily living (ADL)    Impaired functional mobility, balance, gait, and endurance    Left leg pain    Chronic bilateral low back pain with sciatica    At risk for falls    Hydronephrosis with urinary obstruction due to ureteral calculus    History of DVT (deep vein thrombosis)    Hypoglycemia associated with diabetes    Hypomagnesemia    Hypokalemia        Objective:      Physical Exam  Constitutional:       Appearance: Normal appearance.   HENT:      Head: Normocephalic.      Right Ear: Tympanic membrane normal.      Left Ear: Tympanic membrane normal.      Nose: Nose normal.      Mouth/Throat:      Mouth: Mucous membranes are dry.   Cardiovascular:      Rate and Rhythm: Normal rate and regular rhythm.      Pulses: Normal pulses.      Heart sounds: Normal heart sounds.   Pulmonary:      Comments: Patient with labored breathing on exam. Wheezes heard throughout   Abdominal:      General: Abdomen is flat. Bowel sounds are normal.      Palpations: Abdomen is soft.   Musculoskeletal:          General: Normal range of motion.      Cervical back: Normal range of motion and neck supple.   Skin:     General: Skin is warm and dry.   Neurological:      General: No focal deficit present.      Mental Status: She is alert and oriented to person, place, and time.   Psychiatric:         Mood and Affect: Mood normal.         Assessment:       Problem List Items Addressed This Visit          Pulmonary    Shortness of breath       Other    Weakness - Primary     Other Visit Diagnoses       Subacute cough                Plan:         Henrietta was seen today for cough and nasal congestion.    Diagnoses and all orders for this visit:    Weakness  Shortness of breath  Subacute cough    Patient with labored breathing while walking into clinic and during our visit.   Has had worsening weakness in the last two days with two falls.   Lives alone, decreased PO intake.   DDX incldues PNA vs bronchitis/ COPD exacerbation.     Sent to ER from clinic today for further evaluation and management.            Yari Jessica MD   Internal Medicine   Primary Care

## 2023-09-15 NOTE — ED TRIAGE NOTES
82 y/o F presents to ER with c/c LOC and weakness. Pt states she has passed out twice and fall since yesterday. Pt endorses productive cough, SOB, weakness, and diarrhea. Pt denies c/p, nausea and vomiting, fevers and chills.

## 2023-09-15 NOTE — ED PROVIDER NOTES
Encounter Date: 9/15/2023       History     Chief Complaint   Patient presents with    Multiple complaints      SOB, weakness, and cough, multiple falls due to weakness coming from clinic     83-year-old female HTN, CKD 3, DM, hyperparathyroidism that presents to the ED with cough for 2 weeks, SOB, yellow colored productive sputum. She also complains of generalized abdominal pain associated with her cough. Abdomin is non TTP in all quadrants, but complains of 2 weeks of diarrhea (3 or 4 episodes per day) with eating.  She also had 2 falls this week, one last night and she feel with walking to the bathroom. No head trauma, but unsure when she woke up.  No sick contacts, recent illness 2 weeks ago but her cough has worsened over the past week. She was sent in the clinic.     The history is provided by the patient.     Review of patient's allergies indicates:   Allergen Reactions    Crestor [rosuvastatin]      Cramping in legs    Ezetimibe Diarrhea     Other reaction(s): abdominal pain, Diarrhea      Hydrocodone Itching    Lisinopril      Other reaction(s): cough      Sulfa (sulfonamide antibiotics) Itching and Rash           Sulfamethoxazole-trimethoprim     Valsartan Swelling     Past Medical History:   Diagnosis Date    Abnormality of lung 11/08/2011    Stable bandlike opacities at the lung bases, most likely representing      Anxiety     Arthritis     CAD (coronary artery disease)     Calculus of ureter 2/22/2011    CHF (congestive heart failure)     Chronic back pain 7/29/2012    Colon polyp 9/2010; 11/2010    Colon polyps 7/29/2012    Coronary artery disease involving native coronary artery of native heart with angina pectoris     Depression     Diabetes mellitus     Diabetes mellitus type II     Diverticulitis large intestine 7/27/2015    Diverticulitis of large intestine without perforation or abscess without bleeding     Diverticulosis 09/25/2010; 11/02/2010; 11/08/2011; 7/29/2012    Duodenal disorder  08/25/2011    Duodenal erosion noted on EGD.    Duodenal ulcer, unspecified as acute or chronic, without hemorrhage, perforation, or obstruction 8/24/2011    E. coli sepsis 12/2010    Due to left ureteral stone with left nephrostomy tube - hospitalized in [place]    Esophageal dysmotility 01/24/2012    Noted on upper GI-barium swallow.    Facial weakness 1969    Left facial weakness s/p left mastoidectomy in ~ 1969.    Fatty liver 11/08/2011    Reported on CT-abdomen and in 06/2012 Gastro clinic visit note.    Gastric polyp 09/29/2010    GERD (gastroesophageal reflux disease) 7/29/2012    Hepatomegaly 11/08/2011    Reported on CT-abdomen    Herpes zoster with other nervous system complications(053.19) 2/28/2011    Hiatal hernia 06/26/2006; 09/29/2010; 08/25/2011    Noted on barium swallow 2006; noted on  EGD 2011.    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    Trouble in sleeping     Type II or unspecified type diabetes mellitus with neurological manifestations, not stated as uncontrolled(250.60)     Type II or unspecified type diabetes mellitus with peripheral circulatory disorders, not stated as uncontrolled(250.70)     Type II or unspecified type diabetes mellitus with renal  manifestations, not stated as uncontrolled(250.40)      Past Surgical History:   Procedure Laterality Date    BELPHAROPTOSIS REPAIR      s/p LAMEBRTO levator repair - Dr. Dejesus    CARDIAC CATHETERIZATION      CARPAL TUNNEL RELEASE  3/13/2012    CATARACT EXTRACTION W/  INTRAOCULAR LENS IMPLANT Right 10/31/2018        CATARACT EXTRACTION W/  INTRAOCULAR LENS IMPLANT Left 03/22/2018        CHOLECYSTECTOMY      COLONOSCOPY N/A 11/16/2016    Procedure: COLONOSCOPY;  Surgeon: Chris Storey MD;  Location: [address]);  Service: Endoscopy;  Laterality: N/A;    COLONOSCOPY N/A 6/14/2017    Procedure: COLONOSCOPY;  Surgeon: Chris Storey MD;  Location: [facility] ([address] [place] [address]);  Service: Endoscopy;  Laterality: N/A;  colonoscopy in 3 months with better bowel prep. - Split PEG prep ordered    CYSTOSCOPY  4/19/2023    Procedure: CYSTOSCOPY;  Surgeon: Domo Bates MD;  Location: [address];  Service: Urology;;    CYSTOSCOPY N/A 5/12/2023    Procedure: CYSTOSCOPY;  Surgeon: Laz Rudolph Jr., MD;  Location: [facility] OR [address] [place] [address];  Service: Urology;  Laterality: N/A;    CYSTOSCOPY N/A 7/7/2023    Procedure: CYSTOSCOPY;  Surgeon: Laz Rudolph Jr., MD;  Location: [address];  Service: Urology;  Laterality: N/A;    ESOPHAGOGASTRODUODENOSCOPY N/A 11/10/2021    Procedure: EGD (ESOPHAGOGASTRODUODENOSCOPY);  Surgeon: Kuldeep Velázquez MD;  Location: [address]);  Service: Endoscopy;  Laterality: N/A;  11/4-eliquis hold ok see te -tb-fully vacc-inst mail and verbal-    EXTRACORPOREAL SHOCK WAVE LITHOTRIPSY      EXTRACTION - STONE Right 5/12/2023    Procedure: EXTRACTION - STONE;  Surgeon: Laz Rudolph Jr., MD;  Location: [address];  Service: Urology;  Laterality: Right;    INJECTION OF ANESTHETIC AGENT AROUND NERVE Bilateral 6/24/2020    Procedure: BLOCK, NERVE, C4-C5-C6 MEDIAL BRANCH ok per [place] to add on;  Surgeon: Oscar Ravi MD;  Location: [facility] PAIN MGT;  Service: Pain  Management;  Laterality: Bilateral;    INTRAOCULAR PROSTHESES INSERTION Right 10/31/2018    Procedure: INSERTION-INTRAOCULAR LENS (IOL);  Surgeon: Keysha Valle MD;  Location: [facility] OR;  Service: Ophthalmology;  Laterality: Right;    JOINT REPLACEMENT      LASER LITHOTRIPSY Right 5/12/2023    Procedure: LITHOTRIPSY, USING LASER;  Surgeon: Laz Rudolph Jr., MD;  Location: [facility] OR 1ST FLR;  Service: Urology;  Laterality: Right;    LASER LITHOTRIPSY Right 7/7/2023    Procedure: LITHOTRIPSY, USING LASER;  Surgeon: Laz Rudolph Jr., MD;  Location: [facility] OR 1ST FLR;  Service: Urology;  Laterality: Right;    mastoid tumor removal  1969    Left mastoidectomy with residual left facial weakness.    NEPHROSTOMY      OOPHORECTOMY      PHACOEMULSIFICATION OF CATARACT Right 10/31/2018    Procedure: PHACOEMULSIFICATION-ASPIRATION-CATARACT;  Surgeon: Keysha Valle MD;  Location: [facility] OR;  Service: Ophthalmology;  Laterality: Right;    PYELOSCOPY Right 5/12/2023    Procedure: PYELOSCOPY;  Surgeon: Laz Rudolph Jr., MD;  Location: [facility] OR 1ST FLR;  Service: Urology;  Laterality: Right;    PYELOSCOPY Right 7/7/2023    Procedure: PYELOSCOPY;  Surgeon: Laz Rudolph Jr., MD;  Location: [facility] OR 1ST FLR;  Service: Urology;  Laterality: Right;    REMOVAL, STENT, URETER Right 5/12/2023    Procedure: REMOVAL, STENT, URETER;  Surgeon: Laz Rudolph Jr., MD;  Location: [facility] OR 1ST FLR;  Service: Urology;  Laterality: Right;    REMOVAL, STENT, URETER Right 7/7/2023    Procedure: REMOVAL, STENT, URETER;  Surgeon: Laz Rudolph Jr., MD;  Location: [facility] OR 1ST FLR;  Service: Urology;  Laterality: Right;    RETROGRADE PYELOGRAPHY Right 4/19/2023    Procedure: PYELOGRAM, RETROGRADE;  Surgeon: Domo Bates MD;  Location: [facility] OR 1ST FLR;  Service: Urology;  Laterality: Right;    TOTAL ABDOMINAL HYSTERECTOMY  1969    TAHBSO    TOTAL KNEE ARTHROPLASTY  9/13/2012    Left    TRANSFORAMINAL EPIDURAL INJECTION OF STEROID  Bilateral 9/17/2019    Procedure: INJECTION, STEROID, EPIDURAL, TRANSFORAMINAL APPROACH;  Surgeon: Oscar Ravi MD;  Location: [facility]IN [facility];  Service: Pain Management;  Laterality: Bilateral;  B/L TFESI L4/L5    TRANSFORAMINAL EPIDURAL INJECTION OF STEROID Bilateral 10/2/2020    Procedure: INJECTION, STEROID, EPIDURAL, TRANSFORAMINAL APPROACH;  Surgeon: Oscar Ravi MD;  Location: [facility]IN [facility]GT;  Service: Pain Management;  Laterality: Bilateral;  B/L TFESI L4/5    TRANSFORAMINAL EPIDURAL INJECTION OF STEROID Bilateral 5/7/2021    Procedure: INJECTION, STEROID, EPIDURAL, TRANSFORAMINAL APPROACH, L4-L5 clear to hold Eliquis 3 days;  Surgeon: Oscar Ravi MD;  Location: [facility];  Service: Pain Management;  Laterality: Bilateral;    URETERAL STENT PLACEMENT Right 4/19/2023    Procedure: INSERTION, STENT, URETER;  Surgeon: Domo Bates MD;  [facility];  Service: Urology;  Laterality: Right;    URETERAL STENT PLACEMENT N/A 5/12/2023    Procedure: INSERTION, STENT, URETER;  Surgeon: Laz Rudolph Jr., MD;  Location: [facility] OR 1ST FLR;  Service: Urology;  Laterality: N/A;    URETERAL STENT PLACEMENT Right 7/7/2023    Procedure: INSERTION, STENT, URETER;  Surgeon: Laz Rudolph Jr., MD;  [facility];  Service: Urology;  Laterality: Right;    URETEROSCOPY Right 5/12/2023    Procedure: URETEROSCOPY;  Surgeon: Laz Rudolph Jr., MD;  Location: [facility] OR 1ST FLR;  Service: Urology;  Laterality: Right;    URETEROSCOPY Right 7/7/2023    Procedure: URETEROSCOPY;  Surgeon: Laz Rudolph Jr., MD;  Location: [facility] OR 1ST FLR;  Service: Urology;  Laterality: Right;     Family History   Problem Relation Age of Onset    Sleep apnea Sister     Diabetes Sister     Cancer Mother         brain tumor    Hypertension Mother     Heart disease Father     Heart attack Father     Dementia Brother     Lupus Brother     Frontotemporal dementia Brother     No Known Problems Daughter     No  Known Problems Son     Diabetes Sister     Lupus Sister     Breast cancer Sister     Diabetes Sister     Cancer Sister         breast cancer    Heart attack Sister     Diabetes Sister     Liver cancer Brother     Drug abuse Brother     Alcohol abuse Brother     Cirrhosis Brother     Drug abuse Brother     No Known Problems Daughter     No Known Problems Son     No Known Problems Son     No Known Problems Maternal Grandmother     No Known Problems Maternal Grandfather     No Known Problems Paternal Grandmother     No Known Problems Paternal Grandfather     Diabetes Brother     Dementia Brother     Diabetes Other     Ovarian cancer Other         Niece     Breast cancer Maternal Aunt     No Known Problems Maternal Uncle     No Known Problems Paternal Aunt     No Known Problems Paternal Uncle     Glaucoma Neg Hx     Blindness Neg Hx     Celiac disease Neg Hx     Colon cancer Neg Hx     Colon polyps Neg Hx     Esophageal cancer Neg Hx     Inflammatory bowel disease Neg Hx     Liver disease Neg Hx     Rectal cancer Neg Hx     Stomach cancer Neg Hx     Ulcerative colitis Neg Hx     Melanoma Neg Hx     Multiple sclerosis Neg Hx     Psoriasis Neg Hx     Amblyopia Neg Hx     Cataracts Neg Hx     Macular degeneration Neg Hx     Retinal detachment Neg Hx     Strabismus Neg Hx     Stroke Neg Hx     Thyroid disease Neg Hx      Social History     Tobacco Use    Smoking status: Former     Current packs/day: 0.00     Average packs/day: 1 pack/day for 15.0 years (15.0 ttl pk-yrs)     Types: Cigarettes     Start date: 1985     Quit date: 2000     Years since quittin.1    Smokeless tobacco: Never   Substance Use Topics    Alcohol use: No    Drug use: No     Review of Systems    Physical Exam     Initial Vitals [09/15/23 1458]   BP Pulse Resp Temp SpO2   135/67 77 18 99.6 °F (37.6 °C) (!) 94 %      MAP       --         Physical Exam    Nursing note and vitals reviewed.  Constitutional: She appears well-nourished. No  distress.   HENT:   Head: Normocephalic.   Eyes: EOM are normal.   Cardiovascular:  Normal rate and regular rhythm.           No murmur heard.  Pulmonary/Chest: Breath sounds normal. No respiratory distress. She has no wheezes.   Abdominal: Abdomen is soft. She exhibits no distension. There is no abdominal tenderness.   Pain with cough     Neurological: She is alert and oriented to person, place, and time.   Skin: Skin is warm.         ED Course   Procedures  Labs Reviewed   SARS-COV-2 RNA AMPLIFICATION, QUAL   CBC W/ AUTO DIFFERENTIAL   COMPREHENSIVE METABOLIC PANEL   B-TYPE NATRIURETIC PEPTIDE   TROPONIN I   URINALYSIS, REFLEX TO URINE CULTURE        ECG Results              EKG 12-lead (Final result)  Result time 09/15/23 16:41:22      Final result by Interface, Lab In [facility] (09/15/23 16:41:22)                   Narrative:    Test Reason : R06.02,    Vent. Rate : 075 BPM     Atrial Rate : 075 BPM     P-R Int : 118 ms          QRS Dur : 120 ms      QT Int : 408 ms       P-R-T Axes : 031 -35 077 degrees     QTc Int : 455 ms    Normal sinus rhythm  Left axis deviation  LVH with QRS widening and repolarization abnormality ( R in aVL , [employer]  product )  Abnormal ECG  When compared with ECG of 18-APR-2023 22:35,  No significant change was found  Confirmed by Vignesh Schmidt MD ([id number]) on 9/15/2023 4:41:12 PM    Referred By: JUAN CARLOS   SELF           Confirmed By:Vignesh Schmidt MD                                  Imaging Results              X-Ray Chest AP Portable (In process)  Result time 09/15/23 17:09:07                     Medications - No data to display  Medical Decision Making  PNA vs URI vs COVID vs gastroenteritis vs UTI with recurrent cough, diarrhea, generalized weakness.  WBC 18, with concern for PNA on CXR and low grade fever, will place in observation for management.   Started on CTX and azithromycin      Amount and/or Complexity of Data Reviewed  Labs: ordered.  Radiology:  ordered.    Risk  Prescription drug management.               ED Course as of 09/15/23 1751   Fri Sep 15, 2023   1750 K at 3.0  WBC at 18K [JJ]      ED Course User Index  [JJ] Nasim Berry MD                    Clinical Impression:   Final diagnoses:  [R06.02] Shortness of breath               Nasim Berry MD  Resident  09/15/23 1186

## 2023-09-16 LAB
ALBUMIN SERPL BCP-MCNC: 2.6 G/DL (ref 3.5–5.2)
ALP SERPL-CCNC: 93 U/L (ref 55–135)
ALT SERPL W/O P-5'-P-CCNC: 27 U/L (ref 10–44)
ANION GAP SERPL CALC-SCNC: 11 MMOL/L (ref 8–16)
ASCENDING AORTA: 2.74 CM
AST SERPL-CCNC: 39 U/L (ref 10–40)
AV INDEX (PROSTH): 0.59
AV MEAN GRADIENT: 10 MMHG
AV PEAK GRADIENT: 18 MMHG
AV VALVE AREA BY VELOCITY RATIO: 1.3 CM²
AV VALVE AREA: 1.63 CM²
AV VELOCITY RATIO: 0.47
BASOPHILS # BLD AUTO: 0.07 K/UL (ref 0–0.2)
BASOPHILS NFR BLD: 0.4 % (ref 0–1.9)
BILIRUB SERPL-MCNC: 0.7 MG/DL (ref 0.1–1)
BSA FOR ECHO PROCEDURE: 2.09 M2
BUN SERPL-MCNC: 15 MG/DL (ref 8–23)
CALCIUM SERPL-MCNC: 9.3 MG/DL (ref 8.7–10.5)
CHLORIDE SERPL-SCNC: 102 MMOL/L (ref 95–110)
CO2 SERPL-SCNC: 24 MMOL/L (ref 23–29)
CREAT SERPL-MCNC: 1 MG/DL (ref 0.5–1.4)
CV ECHO LV RWT: 0.61 CM
DIFFERENTIAL METHOD: ABNORMAL
DOP CALC AO PEAK VEL: 2.14 M/S
DOP CALC AO VTI: 45.17 CM
DOP CALC LVOT AREA: 2.7 CM2
DOP CALC LVOT DIAMETER: 1.87 CM
DOP CALC LVOT PEAK VEL: 1.01 M/S
DOP CALC LVOT STROKE VOLUME: 73.46 CM3
DOP CALC MV VTI: 42.72 CM
DOP CALCLVOT PEAK VEL VTI: 26.76 CM
E WAVE DECELERATION TIME: 273.36 MSEC
E/A RATIO: 0.7
E/E' RATIO: 15 M/S
ECHO LV POSTERIOR WALL: 1.29 CM (ref 0.6–1.1)
EOSINOPHIL # BLD AUTO: 0.4 K/UL (ref 0–0.5)
EOSINOPHIL NFR BLD: 2.6 % (ref 0–8)
ERYTHROCYTE [DISTWIDTH] IN BLOOD BY AUTOMATED COUNT: 15.1 % (ref 11.5–14.5)
EST. GFR  (NO RACE VARIABLE): 55.9 ML/MIN/1.73 M^2
FRACTIONAL SHORTENING: 32 % (ref 28–44)
GLUCOSE SERPL-MCNC: 110 MG/DL (ref 70–110)
HCT VFR BLD AUTO: 31.3 % (ref 37–48.5)
HGB BLD-MCNC: 9.9 G/DL (ref 12–16)
IMM GRANULOCYTES # BLD AUTO: 0.09 K/UL (ref 0–0.04)
IMM GRANULOCYTES NFR BLD AUTO: 0.6 % (ref 0–0.5)
INTERVENTRICULAR SEPTUM: 1.05 CM (ref 0.6–1.1)
IVRT: 131.3 MSEC
LA MAJOR: 5.65 CM
LA MINOR: 5.43 CM
LA WIDTH: 4.1 CM
LEFT ATRIUM SIZE: 3.69 CM
LEFT ATRIUM VOLUME INDEX MOD: 27.7 ML/M2
LEFT ATRIUM VOLUME INDEX: 34.7 ML/M2
LEFT ATRIUM VOLUME MOD: 56.83 CM3
LEFT ATRIUM VOLUME: 71.21 CM3
LEFT INTERNAL DIMENSION IN SYSTOLE: 2.89 CM (ref 2.1–4)
LEFT VENTRICLE DIASTOLIC VOLUME INDEX: 39.43 ML/M2
LEFT VENTRICLE DIASTOLIC VOLUME: 80.84 ML
LEFT VENTRICLE MASS INDEX: 85 G/M2
LEFT VENTRICLE SYSTOLIC VOLUME INDEX: 15.6 ML/M2
LEFT VENTRICLE SYSTOLIC VOLUME: 31.89 ML
LEFT VENTRICULAR INTERNAL DIMENSION IN DIASTOLE: 4.25 CM (ref 3.5–6)
LEFT VENTRICULAR MASS: 174.84 G
LV LATERAL E/E' RATIO: 12.5 M/S
LV SEPTAL E/E' RATIO: 18.75 M/S
LYMPHOCYTES # BLD AUTO: 2.6 K/UL (ref 1–4.8)
LYMPHOCYTES NFR BLD: 15.9 % (ref 18–48)
MAGNESIUM SERPL-MCNC: 1.5 MG/DL (ref 1.6–2.6)
MCH RBC QN AUTO: 26.3 PG (ref 27–31)
MCHC RBC AUTO-ENTMCNC: 31.6 G/DL (ref 32–36)
MCV RBC AUTO: 83 FL (ref 82–98)
MONOCYTES # BLD AUTO: 1.6 K/UL (ref 0.3–1)
MONOCYTES NFR BLD: 9.8 % (ref 4–15)
MV MEAN GRADIENT: 2 MMHG
MV PEAK A VEL: 1.07 M/S
MV PEAK E VEL: 0.75 M/S
MV PEAK GRADIENT: 6 MMHG
MV STENOSIS PRESSURE HALF TIME: 79.27 MS
MV VALVE AREA BY CONTINUITY EQUATION: 1.72 CM2
MV VALVE AREA P 1/2 METHOD: 2.78 CM2
NEUTROPHILS # BLD AUTO: 11.5 K/UL (ref 1.8–7.7)
NEUTROPHILS NFR BLD: 70.7 % (ref 38–73)
NRBC BLD-RTO: 0 /100 WBC
PHOSPHATE SERPL-MCNC: 3.1 MG/DL (ref 2.7–4.5)
PISA TR MAX VEL: 2.81 M/S
PLATELET # BLD AUTO: 262 K/UL (ref 150–450)
PMV BLD AUTO: 9.3 FL (ref 9.2–12.9)
POCT GLUCOSE: 143 MG/DL (ref 70–110)
POCT GLUCOSE: 148 MG/DL (ref 70–110)
POCT GLUCOSE: 148 MG/DL (ref 70–110)
POCT GLUCOSE: 94 MG/DL (ref 70–110)
POTASSIUM SERPL-SCNC: 2.9 MMOL/L (ref 3.5–5.1)
PROT SERPL-MCNC: 6.8 G/DL (ref 6–8.4)
RA MAJOR: 3.9 CM
RA PRESSURE ESTIMATED: 3 MMHG
RA WIDTH: 3.77 CM
RBC # BLD AUTO: 3.77 M/UL (ref 4–5.4)
RIGHT VENTRICULAR END-DIASTOLIC DIMENSION: 2.98 CM
RV TB RVSP: 6 MMHG
SINUS: 2.71 CM
SODIUM SERPL-SCNC: 137 MMOL/L (ref 136–145)
STJ: 2.25 CM
TDI LATERAL: 0.06 M/S
TDI SEPTAL: 0.04 M/S
TDI: 0.05 M/S
TR MAX PG: 32 MMHG
TRICUSPID ANNULAR PLANE SYSTOLIC EXCURSION: 2.51 CM
TV REST PULMONARY ARTERY PRESSURE: 35 MMHG
WBC # BLD AUTO: 16.23 K/UL (ref 3.9–12.7)
Z-SCORE OF LEFT VENTRICULAR DIMENSION IN END DIASTOLE: -3.69
Z-SCORE OF LEFT VENTRICULAR DIMENSION IN END SYSTOLE: -2.09

## 2023-09-16 PROCEDURE — 80053 COMPREHEN METABOLIC PANEL: CPT | Mod: HCNC | Performed by: HOSPITALIST

## 2023-09-16 PROCEDURE — 96365 THER/PROPH/DIAG IV INF INIT: CPT | Mod: 59

## 2023-09-16 PROCEDURE — 99233 PR SUBSEQUENT HOSPITAL CARE,LEVL III: ICD-10-PCS | Mod: HCNC,,,

## 2023-09-16 PROCEDURE — 27100171 HC OXYGEN HIGH FLOW UP TO 24 HOURS: Mod: HCNC

## 2023-09-16 PROCEDURE — 63600175 PHARM REV CODE 636 W HCPCS: Mod: HCNC

## 2023-09-16 PROCEDURE — 96366 THER/PROPH/DIAG IV INF ADDON: CPT

## 2023-09-16 PROCEDURE — 96372 THER/PROPH/DIAG INJ SC/IM: CPT | Performed by: HOSPITALIST

## 2023-09-16 PROCEDURE — G0378 HOSPITAL OBSERVATION PER HR: HCPCS | Mod: HCNC

## 2023-09-16 PROCEDURE — 99233 SBSQ HOSP IP/OBS HIGH 50: CPT | Mod: HCNC,,,

## 2023-09-16 PROCEDURE — 27000190 HC CPAP FULL FACE MASK W/VALVE: Mod: HCNC

## 2023-09-16 PROCEDURE — 97535 SELF CARE MNGMENT TRAINING: CPT | Mod: HCNC

## 2023-09-16 PROCEDURE — 83735 ASSAY OF MAGNESIUM: CPT | Mod: HCNC

## 2023-09-16 PROCEDURE — 36415 COLL VENOUS BLD VENIPUNCTURE: CPT | Mod: HCNC

## 2023-09-16 PROCEDURE — 99499 UNLISTED E&M SERVICE: CPT | Mod: HCNC,,, | Performed by: INTERNAL MEDICINE

## 2023-09-16 PROCEDURE — 99900035 HC TECH TIME PER 15 MIN (STAT): Mod: HCNC

## 2023-09-16 PROCEDURE — 94660 CPAP INITIATION&MGMT: CPT | Mod: HCNC

## 2023-09-16 PROCEDURE — 63700000 PHARM REV CODE 250 ALT 637 W/O HCPCS: Mod: HCNC

## 2023-09-16 PROCEDURE — 63600175 PHARM REV CODE 636 W HCPCS: Mod: HCNC | Performed by: HOSPITALIST

## 2023-09-16 PROCEDURE — 25000003 PHARM REV CODE 250: Mod: HCNC | Performed by: HOSPITALIST

## 2023-09-16 PROCEDURE — 97165 OT EVAL LOW COMPLEX 30 MIN: CPT | Mod: HCNC

## 2023-09-16 PROCEDURE — 99499 NO LOS: ICD-10-PCS | Mod: HCNC,,, | Performed by: INTERNAL MEDICINE

## 2023-09-16 PROCEDURE — 85025 COMPLETE CBC W/AUTO DIFF WBC: CPT | Mod: HCNC | Performed by: HOSPITALIST

## 2023-09-16 PROCEDURE — 84100 ASSAY OF PHOSPHORUS: CPT | Mod: HCNC

## 2023-09-16 PROCEDURE — 25000003 PHARM REV CODE 250: Mod: HCNC

## 2023-09-16 PROCEDURE — 99900031 HC PATIENT EDUCATION (STAT): Mod: HCNC

## 2023-09-16 PROCEDURE — 94761 N-INVAS EAR/PLS OXIMETRY MLT: CPT | Mod: HCNC

## 2023-09-16 RX ORDER — MAGNESIUM SULFATE HEPTAHYDRATE 40 MG/ML
2 INJECTION, SOLUTION INTRAVENOUS ONCE
Status: COMPLETED | OUTPATIENT
Start: 2023-09-16 | End: 2023-09-16

## 2023-09-16 RX ORDER — POTASSIUM CHLORIDE 20 MEQ/1
40 TABLET, EXTENDED RELEASE ORAL EVERY 4 HOURS
Status: COMPLETED | OUTPATIENT
Start: 2023-09-16 | End: 2023-09-16

## 2023-09-16 RX ORDER — AZITHROMYCIN 250 MG/1
500 TABLET, FILM COATED ORAL EVERY 24 HOURS
Status: DISCONTINUED | OUTPATIENT
Start: 2023-09-16 | End: 2023-09-17 | Stop reason: HOSPADM

## 2023-09-16 RX ADMIN — ROPINIROLE HYDROCHLORIDE 0.5 MG: 0.25 TABLET, FILM COATED ORAL at 09:09

## 2023-09-16 RX ADMIN — CEFTRIAXONE 1 G: 1 INJECTION, POWDER, FOR SOLUTION INTRAMUSCULAR; INTRAVENOUS at 09:09

## 2023-09-16 RX ADMIN — GUAIFENESIN 600 MG: 600 TABLET, EXTENDED RELEASE ORAL at 09:09

## 2023-09-16 RX ADMIN — ATORVASTATIN CALCIUM 40 MG: 40 TABLET, FILM COATED ORAL at 09:09

## 2023-09-16 RX ADMIN — INSULIN DETEMIR 20 UNITS: 100 INJECTION, SOLUTION SUBCUTANEOUS at 09:09

## 2023-09-16 RX ADMIN — APIXABAN 5 MG: 5 TABLET, FILM COATED ORAL at 09:09

## 2023-09-16 RX ADMIN — POTASSIUM CHLORIDE 40 MEQ: 1500 TABLET, EXTENDED RELEASE ORAL at 02:09

## 2023-09-16 RX ADMIN — AZITHROMYCIN 500 MG: 250 TABLET, FILM COATED ORAL at 10:09

## 2023-09-16 RX ADMIN — PANTOPRAZOLE SODIUM 40 MG: 40 TABLET, DELAYED RELEASE ORAL at 09:09

## 2023-09-16 RX ADMIN — POTASSIUM CHLORIDE 40 MEQ: 1500 TABLET, EXTENDED RELEASE ORAL at 09:09

## 2023-09-16 RX ADMIN — DULOXETINE HYDROCHLORIDE 60 MG: 60 CAPSULE, DELAYED RELEASE ORAL at 09:09

## 2023-09-16 RX ADMIN — AMLODIPINE BESYLATE 10 MG: 10 TABLET ORAL at 09:09

## 2023-09-16 RX ADMIN — MAGNESIUM SULFATE HEPTAHYDRATE 2 G: 40 INJECTION, SOLUTION INTRAVENOUS at 09:09

## 2023-09-16 RX ADMIN — CARVEDILOL 25 MG: 25 TABLET, FILM COATED ORAL at 09:09

## 2023-09-16 NOTE — ASSESSMENT & PLAN NOTE
- Cr 1.6 on admit, slightly increased from baseline ~1.3. Suspect prerenal etiology in setting of sepsis/infection with poor PO intake, although patient does have known hx of nephrolithiasis, so renal U/S ordered   -- renal US without evidence of hydronephrosis  - Plan to gently hydrate given hx of HF and hold lasix for now  - Continue to monitor Cr while admitted

## 2023-09-16 NOTE — PLAN OF CARE
Problem: Adult Inpatient Plan of Care  Goal: Plan of Care Review  Outcome: Ongoing, Progressing  Goal: Patient-Specific Goal (Individualized)  Outcome: Ongoing, Progressing  Goal: Absence of Hospital-Acquired Illness or Injury  Outcome: Ongoing, Progressing  Goal: Optimal Comfort and Wellbeing  Outcome: Ongoing, Progressing  Goal: Readiness for Transition of Care  Outcome: Ongoing, Progressing     Problem: Diabetes Comorbidity  Goal: Blood Glucose Level Within Targeted Range  Outcome: Ongoing, Progressing     Problem: Fluid Imbalance (Pneumonia)  Goal: Fluid Balance  Outcome: Ongoing, Progressing     Problem: Infection (Pneumonia)  Goal: Resolution of Infection Signs and Symptoms  Outcome: Ongoing, Progressing     Problem: Respiratory Compromise (Pneumonia)  Goal: Effective Oxygenation and Ventilation  Outcome: Ongoing, Progressing     Problem: Diarrhea  Goal: Fluid and Electrolyte Balance  Outcome: Ongoing, Progressing     Problem: Adjustment to Illness (Chronic Kidney Disease)  Goal: Optimal Coping with Chronic Illness  Outcome: Ongoing, Progressing     Problem: Electrolyte Imbalance (Chronic Kidney Disease)  Goal: Electrolyte Balance  Outcome: Ongoing, Progressing     Problem: Fluid Volume Excess (Chronic Kidney Disease)  Goal: Fluid Balance  Outcome: Ongoing, Progressing     Problem: Functional Decline (Chronic Kidney Disease)  Goal: Optimal Functional Ability  Outcome: Ongoing, Progressing     Problem: Hematologic Alteration (Chronic Kidney Disease)  Goal: Absence of Anemia Signs and Symptoms  Outcome: Ongoing, Progressing     Problem: Oral Intake Inadequate (Chronic Kidney Disease)  Goal: Optimal Oral Intake  Outcome: Ongoing, Progressing     Problem: Pain (Chronic Kidney Disease)  Goal: Acceptable Pain Control  Outcome: Ongoing, Progressing     Problem: Renal Function Impairment (Chronic Kidney Disease)  Goal: Minimize Renal Failure Effects  Outcome: Ongoing, Progressing     Problem: Obstructive Sleep Apnea  Risk or Actual  Goal: Unobstructed Breathing During Sleep  Outcome: Ongoing, Progressing     Problem: Electrolyte Imbalance  Goal: Electrolyte Balance  Outcome: Ongoing, Progressing     Problem: Hypertension Acute  Goal: Blood Pressure Within Desired Range  Outcome: Ongoing, Progressing

## 2023-09-16 NOTE — ASSESSMENT & PLAN NOTE
- A1c 6.4, well-controlled. Home regimen lantus 40 units, aspart 15 units TIDWM and trulicity Q7 days  - levemir 25 units daily and SSI while admitted, titrate up as needed

## 2023-09-16 NOTE — PLAN OF CARE
Problem: Adult Inpatient Plan of Care  Goal: Plan of Care Review  Outcome: Ongoing, Progressing  Goal: Absence of Hospital-Acquired Illness or Injury  Outcome: Ongoing, Progressing  Goal: Optimal Comfort and Wellbeing  Outcome: Ongoing, Progressing     Problem: Diabetes Comorbidity  Goal: Blood Glucose Level Within Targeted Range  Outcome: Ongoing, Progressing     Problem: Infection (Pneumonia)  Goal: Resolution of Infection Signs and Symptoms  Outcome: Ongoing, Progressing     Problem: Respiratory Compromise (Pneumonia)  Goal: Effective Oxygenation and Ventilation  Outcome: Ongoing, Progressing     Problem: Diarrhea  Goal: Fluid and Electrolyte Balance  Outcome: Ongoing, Progressing     Problem: Electrolyte Imbalance (Chronic Kidney Disease)  Goal: Electrolyte Balance  Outcome: Ongoing, Progressing     Problem: Oral Intake Inadequate (Chronic Kidney Disease)  Goal: Optimal Oral Intake  Outcome: Ongoing, Progressing     Problem: Pain (Chronic Kidney Disease)  Goal: Acceptable Pain Control  Outcome: Ongoing, Progressing     Problem: Renal Function Impairment (Chronic Kidney Disease)  Goal: Minimize Renal Failure Effects  Outcome: Ongoing, Progressing     Problem: Obstructive Sleep Apnea Risk or Actual  Goal: Unobstructed Breathing During Sleep  Outcome: Ongoing, Progressing     Problem: Electrolyte Imbalance  Goal: Electrolyte Balance  Outcome: Ongoing, Progressing     Problem: Hypertension Acute  Goal: Blood Pressure Within Desired Range  Outcome: Ongoing, Progressing

## 2023-09-16 NOTE — PT/OT/SLP EVAL
"Occupational Therapy   Evaluation    Name: Henrietta Villasenor  MRN: [id number]  Admitting Diagnosis: Community acquired pneumonia  Recent Surgery: * No surgery found *      Recommendations:     Discharge Recommendations: home health OT  Discharge Equipment Recommendations:  walker, rolling  Barriers to discharge:  Other (Comment) (Lives alone, 4 LISSA)    Assessment:     Henrietta Villasenor is a 83 y.o. female with a medical diagnosis of Community acquired pneumonia.  She presents with decreased (I) with self care and functional mobility. Performance deficits affecting function: weakness, impaired endurance, impaired self care skills, impaired functional mobility, gait instability, impaired balance, decreased lower extremity function.    Pt would continue to benefit from skilled OT services to maximize functional independence with ADLs and functional mobility, reduce caregiver burden, and facilitate safe discharge in the least restrictive environment.Rehab Prognosis: Good; patient would benefit from acute skilled OT services to address these deficits and reach maximum level of function.       Plan:     Patient to be seen 4 x/week to address the above listed problems via self-care/home management, therapeutic activities, therapeutic exercises  Plan of Care Expires: 10/06/23  Plan of Care Reviewed with: patient    Subjective     Chief Complaint: indigestion   Patient/Family Comments/goals: Pt stated, " Sometimes my niece or nephew spend the night."    Occupational Profile:  Living Environment: Lives alone, SSH, tub/ shower with shower chair and grab bar, uses SPC, 4 LISSA bilateral rails, Driving  Previous level of function: Independent to modified independent with self care and functional mobility  Roles and Routines: Aunt  Equipment Used at Home: cane, straight, shower chair  Assistance upon Discharge: Intermittent assist and supervision    Pain/Comfort:  Pain Rating 1: 0/10  Pain Rating Post-Intervention 1: 0/10    Patients " cultural, spiritual, Rastafari conflicts given the current situation: no    Objective:     Communicated with: Nurse prior to session.  Patient found supine with  (no active lines) upon OT entry to room.    General Precautions: Standard, fall  Orthopedic Precautions: N/A  Braces: N/A  Respiratory Status: Room air    Occupational Performance:    Bed Mobility:    Patient completed Supine to Sit with supervision  Patient completed Sit to Supine with supervision    Functional Mobility/Transfers:  Patient completed Sit <> Stand Transfer with supervision and contact guard assistance  with  no assistive device and rolling walker   Patient completed Toilet Transfer ambulated technique with supervision with  rolling walker  Functional Mobility: 1st trial: sit <> stand /c no AD contact guard assist 2/2 to c/o of B LE weakness and some pain.   2nd trial: sit <> stand /c RW supervision, pt ambulated from bed to bathroom     Activities of Daily Living:  Grooming: modified independence hand hygiene  Toileting: contact guard assistance 2/2 managing RW     Cognitive/Visual Perceptual:  Cognitive/Psychosocial Skills:     -       Oriented to: Person, Place, Time, and Situation   -       Follows Commands/attention:Follows multistep  commands  -       Communication: clear/fluent  -       Memory: No Deficits noted  -       Safety awareness/insight to disability: impaired   -       Mood/Affect/Coping skills/emotional control: Appropriate to situation    Physical Exam:  Balance: -       fair dynamic standing balance  Dominant hand: -       right  Upper Extremity Range of Motion:     -       Right Upper Extremity: WFL  -       Left Upper Extremity: WFL  Upper Extremity Strength:    -       Right Upper Extremity: WFL  -       Left Upper Extremity: WFL   Strength:    -       Right Upper Extremity: WFL  -       Left Upper Extremity: WFL    AMPAC 6 Click ADL:  AMPAC Total Score: 24    Treatment & Education:  -Education on importance of OOB  activity to improve overall activity tolerance and promote recovery  -Pt educated to call for assistance and to transfer with hospital staff only  -Provided education regarding role of OT, POC, & discharge recommendations with pt  verbalizing understanding.  Pt had no further questions & when asked whether there were any concerns pt reported none.     Patient left supine with all lines intact    GOALS:   Multidisciplinary Problems       Occupational Therapy Goals          Problem: Occupational Therapy    Goal Priority Disciplines Outcome Interventions   Occupational Therapy Goal     OT, PT/OT Ongoing, Progressing    Description: Goals to be met by: 10/7/23     Patient will increase functional independence with ADLs by performing:    UE Dressing with Tom Green.  LE Dressing with Tom Green.  Grooming while standing with Supervision.  Toileting from toilet with Tom Green for hygiene and clothing management.   Bathing from  shower chair/bench with Modified Tom Green.  Toilet transfer to toilet with Modified Tom Green.                         History:     Past Medical History:   Diagnosis Date    Abnormality of lung 11/08/2011    Stable bandlike opacities at the lung bases, most likely representing      Anxiety     Arthritis     CAD (coronary artery disease)     Calculus of ureter 2/22/2011    CHF (congestive heart failure)     Chronic back pain 7/29/2012    Colon polyp 9/2010; 11/2010    Colon polyps 7/29/2012    Coronary artery disease involving native coronary artery of native heart with angina pectoris     Depression     Diabetes mellitus     Diabetes mellitus type II     Diverticulitis large intestine 7/27/2015    Diverticulitis of large intestine without perforation or abscess without bleeding     Diverticulosis 09/25/2010; 11/02/2010; 11/08/2011; 7/29/2012    Duodenal disorder 08/25/2011    Duodenal erosion noted on EGD.    Duodenal ulcer, unspecified as acute or chronic, without hemorrhage,  perforation, or obstruction 8/24/2011    E. coli sepsis 12/2010    Due to left ureteral stone with left nephrostomy tube - hospitalized in [place]    Esophageal dysmotility 01/24/2012    Noted on upper GI-barium swallow.    Facial weakness 1969    Left facial weakness s/p left mastoidectomy in ~ 1969.    Fatty liver 11/08/2011    Reported on CT-abdomen and in 06/2012 Gastro clinic visit note.    Gastric polyp 09/29/2010    GERD (gastroesophageal reflux disease) 7/29/2012    Hepatomegaly 11/08/2011    Reported on CT-abdomen    Herpes zoster with other nervous system complications(053.19) 2/28/2011    Hiatal hernia 06/26/2006; 09/29/2010; 08/25/2011    Noted on barium swallow 2006; noted on  EGD 2011.    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    Trouble in sleeping     Type II or unspecified type diabetes mellitus with neurological manifestations, not stated as uncontrolled(250.60)     Type II or unspecified type diabetes mellitus with peripheral circulatory disorders, not stated as uncontrolled(250.70)     Type II or unspecified type diabetes mellitus with renal manifestations, not stated as uncontrolled(250.40)          Past Surgical History:   Procedure Laterality Date    BELPHAROPTOSIS  REPAIR      s/p LAMBERTO levator repair - Dr. Dejesus    CARDIAC CATHETERIZATION      CARPAL TUNNEL RELEASE  3/13/2012    CATARACT EXTRACTION W/  INTRAOCULAR LENS IMPLANT Right 10/31/2018        CATARACT EXTRACTION W/  INTRAOCULAR LENS IMPLANT Left 03/22/2018        CHOLECYSTECTOMY      COLONOSCOPY N/A 11/16/2016    Procedure: COLONOSCOPY;  Surgeon: Chris Storey MD;  [facility]  Service: Endoscopy;  Laterality: N/A;    COLONOSCOPY N/A 6/14/2017    Procedure: COLONOSCOPY;  Surgeon: Chris Storey MD;  Location: [facility] ENDO (4TH FLR);  Service: Endoscopy;  Laterality: N/A;  colonoscopy in 3 months with better bowel prep. - Split PEG prep ordered    CYSTOSCOPY  4/19/2023    Procedure: CYSTOSCOPY;  Surgeon: Domo Bates MD;  Location: [facility] OR [address] [place] [address];  Service: Urology;;    CYSTOSCOPY N/A 5/12/2023    Procedure: CYSTOSCOPY;  Surgeon: Laz Rudolph Jr., MD;  Location: [facility] OR [address] [place] [address];  Service: Urology;  Laterality: N/A;    CYSTOSCOPY N/A 7/7/2023    Procedure: CYSTOSCOPY;  Surgeon: Laz Rudolph Jr., MD;  Location: [facility] OR [address] [place] [address];  Service: Urology;  Laterality: N/A;    ESOPHAGOGASTRODUODENOSCOPY N/A 11/10/2021    Procedure: EGD (ESOPHAGOGASTRODUODENOSCOPY);  Surgeon: Kuldeep Velázquez MD;  Location: [address]);  Service: Endoscopy;  Laterality: N/A;  11/4-eliquis hold ok see te -tb-fully vacc-inst mail and verbal-    EXTRACORPOREAL SHOCK WAVE LITHOTRIPSY      EXTRACTION - STONE Right 5/12/2023    Procedure: EXTRACTION - STONE;  Surgeon: Laz Rudolph Jr., MD;  Location: [facility] OR [address] [place] [address];  Service: Urology;  Laterality: Right;    INJECTION OF ANESTHETIC AGENT AROUND NERVE Bilateral 6/24/2020    Procedure: BLOCK, NERVE, C4-C5-C6 MEDIAL BRANCH ok per [place] to add on;  Surgeon: Oscar Ravi MD;  Location: [facility] PAIN MGT;  Service: Pain Management;  Laterality: Bilateral;    INTRAOCULAR PROSTHESES INSERTION Right 10/31/2018    Procedure: INSERTION-INTRAOCULAR LENS  (IOL);  Surgeon: Keysha Valle MD;  Location: [facility] OR;  Service: Ophthalmology;  Laterality: Right;    JOINT REPLACEMENT      LASER LITHOTRIPSY Right 5/12/2023    Procedure: LITHOTRIPSY, USING LASER;  Surgeon: Laz Rudolph Jr., MD;  [facility];  Service: Urology;  Laterality: Right;    LASER LITHOTRIPSY Right 7/7/2023    Procedure: LITHOTRIPSY, USING LASER;  Surgeon: Laz Rudolph Jr., MD;  [facility];  Service: Urology;  Laterality: Right;    mastoid tumor removal  1969    Left mastoidectomy with residual left facial weakness.    NEPHROSTOMY      OOPHORECTOMY      PHACOEMULSIFICATION OF CATARACT Right 10/31/2018    Procedure: PHACOEMULSIFICATION-ASPIRATION-CATARACT;  Surgeon: Keysha Valle MD;  [facility];  Service: Ophthalmology;  Laterality: Right;    PYELOSCOPY Right 5/12/2023    Procedure: PYELOSCOPY;  Surgeon: Laz Rudolph Jr., MD;  [facility];  Service: Urology;  Laterality: Right;    PYELOSCOPY Right 7/7/2023    Procedure: PYELOSCOPY;  Surgeon: Laz Rudolph Jr., MD;  [facility];  Service: Urology;  Laterality: Right;    REMOVAL, STENT, URETER Right 5/12/2023    Procedure: REMOVAL, STENT, URETER;  Surgeon: Laz Rudolph Jr., MD;  [facility];  Service: Urology;  Laterality: Right;    REMOVAL, STENT, URETER Right 7/7/2023    Procedure: REMOVAL, STENT, URETER;  Surgeon: Laz Rudolph Jr., MD;  [facility];  Service: Urology;  Laterality: Right;    RETROGRADE PYELOGRAPHY Right 4/19/2023    Procedure: PYELOGRAM, RETROGRADE;  Surgeon: Domo Bates MD;  [facility];  Service: Urology;  Laterality: Right;    TOTAL ABDOMINAL HYSTERECTOMY  1969    TAHBSO    TOTAL KNEE ARTHROPLASTY  9/13/2012    Left    TRANSFORAMINAL EPIDURAL INJECTION OF STEROID Bilateral 9/17/2019    Procedure: INJECTION, STEROID, EPIDURAL, TRANSFORAMINAL APPROACH;  Surgeon: Oscar Ravi MD;  Location: [facility]  PAIN MGT;  Service: Pain Management;  Laterality: Bilateral;  B/L TFESI L4/L5    TRANSFORAMINAL EPIDURAL INJECTION OF STEROID Bilateral 10/2/2020    Procedure: INJECTION, STEROID, EPIDURAL, TRANSFORAMINAL APPROACH;  Surgeon: Oscar Ravi MD;  Location: [facility] PAIN MGT;  Service: Pain Management;  Laterality: Bilateral;  B/L TFESI L4/5    TRANSFORAMINAL EPIDURAL INJECTION OF STEROID Bilateral 5/7/2021    Procedure: INJECTION, STEROID, EPIDURAL, TRANSFORAMINAL APPROACH, L4-L5 clear to hold Eliquis 3 days;  Surgeon: Oscar Ravi MD;  Location: [facility] PAIN MGT;  Service: Pain Management;  Laterality: Bilateral;    URETERAL STENT PLACEMENT Right 4/19/2023    Procedure: INSERTION, STENT, URETER;  Surgeon: Domo Bates MD;  [facility];  Service: Urology;  Laterality: Right;    URETERAL STENT PLACEMENT N/A 5/12/2023    Procedure: INSERTION, STENT, URETER;  Surgeon: Laz Rudolph Jr., MD;  [facility];  Service: Urology;  Laterality: N/A;    URETERAL STENT PLACEMENT Right 7/7/2023    Procedure: INSERTION, STENT, URETER;  Surgeon: Laz Rudolph Jr., MD;  [facility];  Service: Urology;  Laterality: Right;    URETEROSCOPY Right 5/12/2023    Procedure: URETEROSCOPY;  Surgeon: aLz Rudolph Jr., MD;  [facility];  Service: Urology;  Laterality: Right;    URETEROSCOPY Right 7/7/2023    Procedure: URETEROSCOPY;  Surgeon: Laz Rudolph Jr., MD;  [facility];  Service: Urology;  Laterality: Right;       Time Tracking:     OT Date of Treatment:    OT Start Time: 1513  OT Stop Time: 1529  OT Total Time (min): 16 min    Billable Minutes:Evaluation 8  Self Care/Home Management 8    9/16/2023

## 2023-09-16 NOTE — ED NOTES
Telemetry Verification   Patient placed on Telemetry Box  Verified with War Room  Box # [id number]   Monitor Tech Libra   Rate 75   Rhythm NS

## 2023-09-16 NOTE — ASSESSMENT & PLAN NOTE
-Cr 1.6 on admit, slightly increased from baseline ~1.3. Suspect prerenal etiology in setting of sepsis/infection with poor PO intake, although pt. Does have known hx of nephrolithiasis, so renal U/S ordered  -Plan to gently hydrate given hx of HF and hold lasix for now  -Continue to monitor Cr while admitted

## 2023-09-16 NOTE — ASSESSMENT & PLAN NOTE
-Pt. With mild HANNAH, dry mucous membranes, BNP <100. Suspect volume down  -Hold lasix, gently hydrate. Will repeat TTE given pt. Reports of syncope/falls and last in syestm >1 yr ago, f/u repeat

## 2023-09-16 NOTE — SUBJECTIVE & OBJECTIVE
Past Medical History:   Diagnosis Date    Abnormality of lung 11/08/2011    Stable bandlike opacities at the lung bases, most likely representing      Anxiety     Arthritis     CAD (coronary artery disease)     Calculus of ureter 2/22/2011    CHF (congestive heart failure)     Chronic back pain 7/29/2012    Colon polyp 9/2010; 11/2010    Colon polyps 7/29/2012    Coronary artery disease involving native coronary artery of native heart with angina pectoris     Depression     Diabetes mellitus     Diabetes mellitus type II     Diverticulitis large intestine 7/27/2015    Diverticulitis of large intestine without perforation or abscess without bleeding     Diverticulosis 09/25/2010; 11/02/2010; 11/08/2011; 7/29/2012    Duodenal disorder 08/25/2011    Duodenal erosion noted on EGD.    Duodenal ulcer, unspecified as acute or chronic, without hemorrhage, perforation, or obstruction 8/24/2011    E. coli sepsis 12/2010    Due to left ureteral stone with left nephrostomy tube - hospitalized in [place]    Esophageal dysmotility 01/24/2012    Noted on upper GI-barium swallow.    Facial weakness 1969    Left facial weakness s/p left mastoidectomy in ~ 1969.    Fatty liver 11/08/2011    Reported on CT-abdomen and in 06/2012 Gastro clinic visit note.    Gastric polyp 09/29/2010    GERD (gastroesophageal reflux disease) 7/29/2012    Hepatomegaly 11/08/2011    Reported on CT-abdomen    Herpes zoster with other nervous system complications(053.19) 2/28/2011    Hiatal hernia 06/26/2006; 09/29/2010; 08/25/2011    Noted on barium swallow 2006; noted on  EGD 2011.    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    Trouble in sleeping     Type II or unspecified type diabetes mellitus with neurological manifestations, not stated as uncontrolled(250.60)     Type II or unspecified type diabetes mellitus with peripheral circulatory disorders, not stated as uncontrolled(250.70)     Type II or unspecified type diabetes mellitus with renal manifestations, not stated as uncontrolled(250.40)        Past Surgical History:   Procedure Laterality Date    BELPHAROPTOSIS REPAIR      s/p LAMBERTO levator repair - Dr. Dejesus    CARDIAC CATHETERIZATION      CARPAL TUNNEL RELEASE  3/13/2012    CATARACT EXTRACTION W/  INTRAOCULAR LENS IMPLANT Right 10/31/2018        CATARACT EXTRACTION W/  INTRAOCULAR LENS IMPLANT Left 03/22/2018        CHOLECYSTECTOMY      COLONOSCOPY N/A 11/16/2016    Procedure: COLONOSCOPY;  Surgeon: Chris Storey MD;  Location: [facility] ([address] [place] [address]);  Service: Endoscopy;  Laterality: N/A;    COLONOSCOPY N/A 6/14/2017    Procedure: COLONOSCOPY;  Surgeon: Chris Storey MD;  Location: [facility] (4TH FLR);  Service: Endoscopy;  Laterality: N/A;  colonoscopy in 3 months with better bowel prep. - Split PEG prep ordered    CYSTOSCOPY  4/19/2023    Procedure: CYSTOSCOPY;  Surgeon: Domo Bates MD;  Location: [facility] OR [address] [place] [address];  Service: Urology;;    CYSTOSCOPY N/A 5/12/2023    Procedure: CYSTOSCOPY;  Surgeon: Laz Rudolph Jr., MD;  Location: [facility] OR [address] [place] [address];  Service: Urology;  Laterality: N/A;    CYSTOSCOPY N/A 7/7/2023    Procedure: CYSTOSCOPY;  Surgeon: Laz Rudolph Jr., MD;  Location: [facility] OR [address] [place] [address];   Service: Urology;  Laterality: N/A;    ESOPHAGOGASTRODUODENOSCOPY N/A 11/10/2021    Procedure: EGD (ESOPHAGOGASTRODUODENOSCOPY);  Surgeon: Kuldeep Velázquez MD;  Location: [facility] ENDO (4TH FLR);  Service: Endoscopy;  Laterality: N/A;  11/4-eliquis hold ok see te -tb-fully vacc-inst mail and verbal-    EXTRACORPOREAL SHOCK WAVE LITHOTRIPSY      EXTRACTION - STONE Right 5/12/2023    Procedure: EXTRACTION - STONE;  Surgeon: Laz Rudolph Jr., MD;  Location: [facility] OR 1ST FLR;  Service: Urology;  Laterality: Right;    INJECTION OF ANESTHETIC AGENT AROUND NERVE Bilateral 6/24/2020    Procedure: BLOCK, NERVE, C4-C5-C6 MEDIAL BRANCH ok per [place] to add on;  Surgeon: Oscar Ravi MD;  Location: [facility] PAIN MGT;  Service: Pain Management;  Laterality: Bilateral;    INTRAOCULAR PROSTHESES INSERTION Right 10/31/2018    Procedure: INSERTION-INTRAOCULAR LENS (IOL);  Surgeon: Keysha Valle MD;  Location: [facility] OR;  Service: Ophthalmology;  Laterality: Right;    JOINT REPLACEMENT      LASER LITHOTRIPSY Right 5/12/2023    Procedure: LITHOTRIPSY, USING LASER;  Surgeon: Laz Rudolph Jr., MD;  [facility];  Service: Urology;  Laterality: Right;    LASER LITHOTRIPSY Right 7/7/2023    Procedure: LITHOTRIPSY, USING LASER;  Surgeon: Laz Rudolph Jr., MD;  [facility];  Service: Urology;  Laterality: Right;    mastoid tumor removal  1969    Left mastoidectomy with residual left facial weakness.    NEPHROSTOMY      OOPHORECTOMY      PHACOEMULSIFICATION OF CATARACT Right 10/31/2018    Procedure: PHACOEMULSIFICATION-ASPIRATION-CATARACT;  Surgeon: Keysha Valle MD;  Location: [facility] OR;  Service: Ophthalmology;  Laterality: Right;    PYELOSCOPY Right 5/12/2023    Procedure: PYELOSCOPY;  Surgeon: Laz Rudolph Jr., MD;  [facility];  Service: Urology;  Laterality: Right;    PYELOSCOPY Right 7/7/2023    Procedure: PYELOSCOPY;  Surgeon: Laz Rudolph Jr., MD;  [facility];   Service: Urology;  Laterality: Right;    REMOVAL, STENT, URETER Right 5/12/2023    Procedure: REMOVAL, STENT, URETER;  Surgeon: Laz Rudolph Jr., MD;  Location: [facility] OR 1ST FLR;  Service: Urology;  Laterality: Right;    REMOVAL, STENT, URETER Right 7/7/2023    Procedure: REMOVAL, STENT, URETER;  Surgeon: Laz Rudolph Jr., MD;  Location: [facility] OR 1ST FLR;  Service: Urology;  Laterality: Right;    RETROGRADE PYELOGRAPHY Right 4/19/2023    Procedure: PYELOGRAM, RETROGRADE;  Surgeon: Domo Bates MD;  Location: [facility] OR 1ST FLR;  Service: Urology;  Laterality: Right;    TOTAL ABDOMINAL HYSTERECTOMY  1969    TAHBSO    TOTAL KNEE ARTHROPLASTY  9/13/2012    Left    TRANSFORAMINAL EPIDURAL INJECTION OF STEROID Bilateral 9/17/2019    Procedure: INJECTION, STEROID, EPIDURAL, TRANSFORAMINAL APPROACH;  Surgeon: Oscar Ravi MD;  Location: [facility] PAIN MGT;  Service: Pain Management;  Laterality: Bilateral;  B/L TFESI L4/L5    TRANSFORAMINAL EPIDURAL INJECTION OF STEROID Bilateral 10/2/2020    Procedure: INJECTION, STEROID, EPIDURAL, TRANSFORAMINAL APPROACH;  Surgeon: Oscar Ravi MD;  Location: [facility] PAIN MGT;  Service: Pain Management;  Laterality: Bilateral;  B/L TFESI L4/5    TRANSFORAMINAL EPIDURAL INJECTION OF STEROID Bilateral 5/7/2021    Procedure: INJECTION, STEROID, EPIDURAL, TRANSFORAMINAL APPROACH, L4-L5 clear to hold Eliquis 3 days;  Surgeon: Oscar Ravi MD;  Location: [facility] PAIN MGT;  Service: Pain Management;  Laterality: Bilateral;    URETERAL STENT PLACEMENT Right 4/19/2023    Procedure: INSERTION, STENT, URETER;  Surgeon: Domo Bates MD;  Location: [facility] OR 1ST FLR;  Service: Urology;  Laterality: Right;    URETERAL STENT PLACEMENT N/A 5/12/2023    Procedure: INSERTION, STENT, URETER;  Surgeon: Laz Rudolph Jr., MD;  Location: [facility] OR 1ST FLR;  Service: Urology;  Laterality: N/A;    URETERAL STENT PLACEMENT Right 7/7/2023    Procedure: INSERTION, STENT, URETER;  Surgeon: Laz SAHA  "Ronni Chavis MD;  Location: [facility] OR [address] [place] [address];  Service: Urology;  Laterality: Right;    URETEROSCOPY Right 5/12/2023    Procedure: URETEROSCOPY;  Surgeon: Laz Rudolph Jr., MD;  [facility];  Service: Urology;  Laterality: Right;    URETEROSCOPY Right 7/7/2023    Procedure: URETEROSCOPY;  Surgeon: Laz Rudolph Jr., MD;  Location: [facility] OR [address] [place] [address];  Service: Urology;  Laterality: Right;       Review of patient's allergies indicates:   Allergen Reactions    Crestor [rosuvastatin]      Cramping in legs    Ezetimibe Diarrhea     Other reaction(s): abdominal pain, Diarrhea      Hydrocodone Itching    Lisinopril      Other reaction(s): cough      Sulfa (sulfonamide antibiotics) Itching and Rash           Sulfamethoxazole-trimethoprim     Valsartan Swelling       No current facility-administered medications on file prior to encounter.     Current Outpatient Medications on File Prior to Encounter   Medication Sig    ACCU-CHEK GUIDE TEST STRIPS Strp TEST BLOOD SUGAR THREE TIMES DAILY    ACCU-CHEK SOFT DEV LANCETS Kit     albuterol (PROVENTIL/VENTOLIN HFA) 90 mcg/actuation inhaler Inhale 2 puffs by mouth into the lungs every 4 (four) hours as needed for Wheezing. Rescue    amLODIPine (NORVASC) 10 MG tablet Take 1 tablet (10 mg total) by mouth once daily. (Patient taking differently: Take 10 mg by mouth every evening.)    apixaban (ELIQUIS) 5 mg Tab Take 1 tablet (5 mg total) by mouth 2 (two) times daily.    atorvastatin (LIPITOR) 40 MG tablet Take 1 tablet (40 mg total) by mouth once daily.    BD ULTRA-FINE OLU PEN NEEDLE 32 gauge x 5/32" Ndle Uses 4 times daily, on multiple daily insulin injections    benzonatate (TESSALON) 100 MG capsule Take 1 capsule (100 mg total) by mouth 3 (three) times daily as needed for Cough.    blood-glucose meter kit Use as instructed    carvediloL (COREG) 25 MG tablet Take 1 tablet (25 mg total) by mouth 2 (two) times daily.    cholecalciferol, vitamin D3, 125 mcg (5,000 unit) " "capsule Take 5,000 Units by mouth once daily.    DROPLET PEN NEEDLE 29 gauge x 1/2" Ndle USE FOUR TIMES DAILY    DROPSAFE ALCOHOL PREP PADS PadM USE TOPICALLY TO TEST BLOOD SUGAR FOUR TIMES DAILY    dulaglutide (TRULICITY) 0.75 mg/0.5 mL pen injector Inject 0.75 mg into the skin every 7 days.    DULoxetine (CYMBALTA) 60 MG capsule Take 1 capsule (60 mg total) by mouth 2 (two) times daily. Please hold until you follow up with your primary care provider    fluticasone propionate (FLONASE) 50 mcg/actuation nasal spray Instill 2 sprays in each nostril once daily.    furosemide (LASIX) 20 MG tablet Take 1 tablet by mouth on Monday, Wednesday, and Friday    insulin (LANTUS SOLOSTAR U-100 INSULIN) glargine 100 units/mL SubQ pen Inject 40 Units into the skin once daily. (Patient taking differently: Inject 40 Units into the skin every evening.)    insulin aspart U-100 (NOVOLOG) 100 unit/mL (3 mL) InPn pen Inject 15 units before meals, hold if sugar is less than 100. Max daily 45 units.    lancets (ACCU-CHEK SOFTCLIX LANCETS) Misc TEST BLOOD SUGAR FOUR TIMES DAILY    olopatadine (PATADAY) 0.2 % Drop Place 1 drop into both eyes once daily.    oxyCODONE-acetaminophen (PERCOCET)  mg per tablet Take 1 tablet by mouth 5 (five) times daily.    pantoprazole (PROTONIX) 40 MG tablet Take 1 tablet (40 mg total) by mouth once daily.    rOPINIRole (REQUIP) 0.5 MG tablet Take 1 tablet (0.5 mg total) by mouth every evening.    [DISCONTINUED] amoxicillin-clavulanate 875-125mg (AUGMENTIN) 875-125 mg per tablet Take 1 tablet by mouth every 12 (twelve) hours. for 7 days    [DISCONTINUED] azithromycin (Z-DARSHANA) 250 MG tablet Take 2 tablets by mouth on day 1; Take 1 tablet by mouth on days 2-5    [DISCONTINUED] ipratropium (ATROVENT HFA) 17 mcg/actuation inhaler Inhale 2 puffs into the lungs every 6 (six) hours. Rescue    [DISCONTINUED] ipratropium (ATROVENT) 0.02 % nebulizer solution Take 2.5 mLs (500 mcg total) by nebulization 4 (four) " times daily. Rescue     Family History       Problem Relation (Age of Onset)    Alcohol abuse Brother    Breast cancer Sister, Maternal Aunt    Cancer Mother, Sister    Cirrhosis Brother    Dementia Brother, Brother    Diabetes Sister, Sister, Sister, Sister, Brother, Other    Drug abuse Brother, Brother    Frontotemporal dementia Brother    Heart attack Father, Sister    Heart disease Father    Hypertension Mother    Liver cancer Brother    Lupus Brother, Sister    No Known Problems Daughter, Son, Daughter, Son, Son, Maternal Grandmother, Maternal Grandfather, Paternal Grandmother, Paternal Grandfather, Maternal Uncle, Paternal Aunt, Paternal Uncle    Ovarian cancer Other    Sleep apnea Sister          Tobacco Use    Smoking status: Former     Current packs/day: 0.00     Average packs/day: 1 pack/day for 15.0 years (15.0 ttl pk-yrs)     Types: Cigarettes     Start date: 1985     Quit date: 2000     Years since quittin.1    Smokeless tobacco: Never   Substance and Sexual Activity    Alcohol use: No    Drug use: No    Sexual activity: Never     Review of Systems   Constitutional:  Positive for activity change, appetite change, chills and fatigue. Negative for fever and unexpected weight change.   HENT:  Negative for congestion and sore throat.    Respiratory:  Positive for cough and shortness of breath.    Cardiovascular:  Negative for chest pain, palpitations and leg swelling.   Gastrointestinal:  Negative for abdominal distention, abdominal pain, blood in stool, constipation, diarrhea, nausea and vomiting.   Genitourinary:  Negative for difficulty urinating, dysuria and hematuria.   Musculoskeletal:  Positive for arthralgias. Negative for myalgias.   Skin:  Negative for color change and rash.   Neurological:  Negative for dizziness, tremors and seizures.     Objective:     Vital Signs (Most Recent):  Temp: 100.3 °F (37.9 °C) (09/15/23 1615)  Pulse: 75 (09/15/23 1845)  Resp: (!) 21 (09/15/23  1845)  BP: (!) 179/81 (09/15/23 1845)  SpO2: 96 % (09/15/23 1845) Vital Signs (24h Range):  Temp:  [99.6 °F (37.6 °C)-100.3 °F (37.9 °C)] 100.3 °F (37.9 °C)  Pulse:  [75-90] 75  Resp:  [18-21] 21  SpO2:  [94 %-99 %] 96 %  BP: (125-179)/(67-81) 179/81     Weight: 90.7 kg (200 lb)  Body mass index is 30.41 kg/m².     Physical Exam  Vitals reviewed.   Constitutional:       General: She is not in acute distress.     Appearance: She is well-developed.   HENT:      Head: Normocephalic and atraumatic.   Eyes:      Extraocular Movements: Extraocular movements intact.      Pupils: Pupils are equal, round, and reactive to light.   Neck:      Vascular: No JVD.      Trachea: No tracheal deviation.   Cardiovascular:      Rate and Rhythm: Normal rate and regular rhythm.      Heart sounds: No murmur heard.     No friction rub. No gallop.   Pulmonary:      Effort: Respiratory distress present.      Breath sounds: Wheezing present. No rales.   Abdominal:      General: Bowel sounds are normal. There is no distension.      Palpations: Abdomen is soft. There is no mass.      Tenderness: There is no abdominal tenderness.   Musculoskeletal:         General: No deformity.      Cervical back: Neck supple.   Lymphadenopathy:      Cervical: No cervical adenopathy.   Skin:     General: Skin is warm and dry.      Findings: No rash.   Neurological:      Mental Status: She is alert and oriented to person, place, and time.              CRANIAL NERVES     CN III, IV, VI   Pupils are equal, round, and reactive to light.       Significant Labs: All pertinent labs within the past 24 hours have been reviewed.    Significant Imaging: I have reviewed all pertinent imaging results/findings within the past 24 hours.

## 2023-09-16 NOTE — ASSESSMENT & PLAN NOTE
-Pt. With productive cough, SOB, chills, low grade fevers, and elevated WBC and procal. Covid negative  -CXR without focal consolidation but concerned for CAP given constellation of symptoms  -Plan to treat with IV ceftriaxone and azitrhomycin, f/u sputum cultures and monitor closely

## 2023-09-16 NOTE — H&P
[facility]  History & Physical    Patient Name: Henrietta Villasenor  MRN: [id number]  Patient Class: OP- Observation  Admission Date: 9/15/2023  Attending Physician: Darwin Ramírez MD   Primary Care Provider: Jeannine Arriaga MD         Patient information was obtained from patient, past medical records and ER records.     Subjective:     Principal Problem:Community acquired pneumonia    Chief Complaint:   Chief Complaint   Patient presents with    Multiple complaints      SOB, weakness, and cough, multiple falls due to weakness coming from clinic        HPI: 84 yo F with PMHx DM2, CKD3, HTN, HFpEF who presents to the ED from IM clinic today for worsening weakness and SOB x 2-3 days. Pt. Reports that she developed first symptoms of a productive cough about 2 weeks ago. Cough initially dry but has progressed to involve green mucus. Pt. Was seen in an urgent care for this cough and prescriebd tessalon, but the cough did not improve. Over the last 2-3 days, the patient has developed significant fatigue and SOB which concerned her. Pt. Lives alone, and She has had difficulty getting around her house. She also reports a couple of falls. Yesterday, she reports developing some chills as well. She scheduled an appointment with her doctor today, and had wheezing/significant increased work of breathing on exam, so she was referred to the ED for further work-up.      Past Medical History:   Diagnosis Date    Abnormality of lung 11/08/2011    Stable bandlike opacities at the lung bases, most likely representing      Anxiety     Arthritis     CAD (coronary artery disease)     Calculus of ureter 2/22/2011    CHF (congestive heart failure)     Chronic back pain 7/29/2012    Colon polyp 9/2010; 11/2010    Colon polyps 7/29/2012    Coronary artery disease involving native coronary artery of native heart with angina pectoris     Depression     Diabetes mellitus     Diabetes mellitus type II      Diverticulitis large intestine 7/27/2015    Diverticulitis of large intestine without perforation or abscess without bleeding     Diverticulosis 09/25/2010; 11/02/2010; 11/08/2011; 7/29/2012    Duodenal disorder 08/25/2011    Duodenal erosion noted on EGD.    Duodenal ulcer, unspecified as acute or chronic, without hemorrhage, perforation, or obstruction 8/24/2011    E. coli sepsis 12/2010    Due to left ureteral stone with left nephrostomy tube - hospitalized in [place]    Esophageal dysmotility 01/24/2012    Noted on upper GI-barium swallow.    Facial weakness 1969    Left facial weakness s/p left mastoidectomy in ~ 1969.    Fatty liver 11/08/2011    Reported on CT-abdomen and in 06/2012 Gastro clinic visit note.    Gastric polyp 09/29/2010    GERD (gastroesophageal reflux disease) 7/29/2012    Hepatomegaly 11/08/2011    Reported on CT-abdomen    Herpes zoster with other nervous system complications(053.19) 2/28/2011    Hiatal hernia 06/26/2006; 09/29/2010; 08/25/2011    Noted on barium swallow 2006; noted on  EGD 2011.   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    Trouble in sleeping     Type II or unspecified type diabetes mellitus with  neurological manifestations, not stated as uncontrolled(250.60)     Type II or unspecified type diabetes mellitus with peripheral circulatory disorders, not stated as uncontrolled(250.70)     Type II or unspecified type diabetes mellitus with renal manifestations, not stated as uncontrolled(250.40)        Past Surgical History:   Procedure Laterality Date    BELPHAROPTOSIS REPAIR      s/p LAMBERTO levator repair - Dr. Dejesus    CARDIAC CATHETERIZATION      CARPAL TUNNEL RELEASE  3/13/2012    CATARACT EXTRACTION W/  INTRAOCULAR LENS IMPLANT Right 10/31/2018        CATARACT EXTRACTION W/  INTRAOCULAR LENS IMPLANT Left 03/22/2018        CHOLECYSTECTOMY      COLONOSCOPY N/A 11/16/2016    Procedure: COLONOSCOPY;  Surgeon: Chris Storey MD;  Location: [facility] (4TH FLR);  Service: Endoscopy;  Laterality: N/A;    COLONOSCOPY N/A 6/14/2017    Procedure: COLONOSCOPY;  Surgeon: Chris Storey MD;  Location: [facility] ENDO (4TH FLR);  Service: Endoscopy;  Laterality: N/A;  colonoscopy in 3 months with better bowel prep. - Split PEG prep ordered    CYSTOSCOPY  4/19/2023    Procedure: CYSTOSCOPY;  Surgeon: Domo Bates MD;  [facility];  Service: Urology;;    CYSTOSCOPY N/A 5/12/2023    Procedure: CYSTOSCOPY;  Surgeon: Laz Rudolph Jr., MD;  Location: [facility] OR [address] [place] [address];  Service: Urology;  Laterality: N/A;    CYSTOSCOPY N/A 7/7/2023    Procedure: CYSTOSCOPY;  Surgeon: Laz Rudolph Jr., MD;  [facility];  Service: Urology;  Laterality: N/A;    ESOPHAGOGASTRODUODENOSCOPY N/A 11/10/2021    Procedure: EGD (ESOPHAGOGASTRODUODENOSCOPY);  Surgeon: Kuldeep Velázquez MD;  Location: [facility] ENDO (4TH FLR);  Service: Endoscopy;  Laterality: N/A;  11/4-eliquis hold ok see te -tb-fully vacc-inst mail and verbal-    EXTRACORPOREAL SHOCK WAVE LITHOTRIPSY      EXTRACTION - STONE Right 5/12/2023    Procedure: EXTRACTION - STONE;  Surgeon: Laz Rudolph Jr., MD;  Location: [facility]  OR 1ST FLR;  Service: Urology;  Laterality: Right;    INJECTION OF ANESTHETIC AGENT AROUND NERVE Bilateral 6/24/2020    Procedure: BLOCK, NERVE, C4-C5-C6 MEDIAL BRANCH ok per [place] to add on;  Surgeon: Oscar Ravi MD;  Location: [facility] PAIN MGT;  Service: Pain Management;  Laterality: Bilateral;    INTRAOCULAR PROSTHESES INSERTION Right 10/31/2018    Procedure: INSERTION-INTRAOCULAR LENS (IOL);  Surgeon: Keysha Valle MD;  Location: [facility] OR;  Service: Ophthalmology;  Laterality: Right;    JOINT REPLACEMENT      LASER LITHOTRIPSY Right 5/12/2023    Procedure: LITHOTRIPSY, USING LASER;  Surgeon: Laz Rudolph Jr., MD;  [facility];  Service: Urology;  Laterality: Right;    LASER LITHOTRIPSY Right 7/7/2023    Procedure: LITHOTRIPSY, USING LASER;  Surgeon: Laz Rudolph Jr., MD;  [facility];  Service: Urology;  Laterality: Right;    mastoid tumor removal  1969    Left mastoidectomy with residual left facial weakness.    NEPHROSTOMY      OOPHORECTOMY      PHACOEMULSIFICATION OF CATARACT Right 10/31/2018    Procedure: PHACOEMULSIFICATION-ASPIRATION-CATARACT;  Surgeon: Keysha Valle MD;  Location: [facility] OR;  Service: Ophthalmology;  Laterality: Right;    PYELOSCOPY Right 5/12/2023    Procedure: PYELOSCOPY;  Surgeon: Laz Rudolph Jr., MD;  [facility];  Service: Urology;  Laterality: Right;    PYELOSCOPY Right 7/7/2023    Procedure: PYELOSCOPY;  Surgeon: Laz Rudolph Jr., MD;  [facility];  Service: Urology;  Laterality: Right;    REMOVAL, STENT, URETER Right 5/12/2023    Procedure: REMOVAL, STENT, URETER;  Surgeon: Laz Rudolph Jr., MD;  Location: [facility] OR 1ST FLR;  Service: Urology;  Laterality: Right;    REMOVAL, STENT, URETER Right 7/7/2023    Procedure: REMOVAL, STENT, URETER;  Surgeon: Laz Rudolph Jr., MD;  [facility];  Service: Urology;  Laterality: Right;    RETROGRADE PYELOGRAPHY Right 4/19/2023     Procedure: PYELOGRAM, RETROGRADE;  Surgeon: Domo Bates MD;  Location: [facility] OR 1ST FLR;  Service: Urology;  Laterality: Right;    TOTAL ABDOMINAL HYSTERECTOMY  1969    TAHBSO    TOTAL KNEE ARTHROPLASTY  9/13/2012    Left    TRANSFORAMINAL EPIDURAL INJECTION OF STEROID Bilateral 9/17/2019    Procedure: INJECTION, STEROID, EPIDURAL, TRANSFORAMINAL APPROACH;  Surgeon: Oscar Ravi MD;  Location: [facility] PAIN [facility]GT;  Service: Pain Management;  Laterality: Bilateral;  B/L TFESI L4/L5    TRANSFORAMINAL EPIDURAL INJECTION OF STEROID Bilateral 10/2/2020    Procedure: INJECTION, STEROID, EPIDURAL, TRANSFORAMINAL APPROACH;  Surgeon: Oscar Ravi MD;  Location: [facility] PAIN MGT;  Service: Pain Management;  Laterality: Bilateral;  B/L TFESI L4/5    TRANSFORAMINAL EPIDURAL INJECTION OF STEROID Bilateral 5/7/2021    Procedure: INJECTION, STEROID, EPIDURAL, TRANSFORAMINAL APPROACH, L4-L5 clear to hold Eliquis 3 days;  Surgeon: Oscar Ravi MD;  Location: [facility];  Service: Pain Management;  Laterality: Bilateral;    URETERAL STENT PLACEMENT Right 4/19/2023    Procedure: INSERTION, STENT, URETER;  Surgeon: Domo Bates MD;  Location: [facility] OR 1ST FLR;  Service: Urology;  Laterality: Right;    URETERAL STENT PLACEMENT N/A 5/12/2023    Procedure: INSERTION, STENT, URETER;  Surgeon: Laz Rudolph Jr., MD;  Location: [facility] OR 1ST FLR;  Service: Urology;  Laterality: N/A;    URETERAL STENT PLACEMENT Right 7/7/2023    Procedure: INSERTION, STENT, URETER;  Surgeon: Laz Rudolph Jr., MD;  Location: [facility] OR 1ST FLR;  Service: Urology;  Laterality: Right;    URETEROSCOPY Right 5/12/2023    Procedure: URETEROSCOPY;  Surgeon: Laz Rudolph Jr., MD;  Location: [facility] OR 1ST FLR;  Service: Urology;  Laterality: Right;    URETEROSCOPY Right 7/7/2023    Procedure: URETEROSCOPY;  Surgeon: Laz Rudolph Jr., MD;  Location: [facility] OR 1ST FLR;  Service: Urology;  Laterality: Right;       Review of patient's  "allergies indicates:   Allergen Reactions    Crestor [rosuvastatin]      Cramping in legs    Ezetimibe Diarrhea     Other reaction(s): abdominal pain, Diarrhea      Hydrocodone Itching    Lisinopril      Other reaction(s): cough      Sulfa (sulfonamide antibiotics) Itching and Rash           Sulfamethoxazole-trimethoprim     Valsartan Swelling       No current facility-administered medications on file prior to encounter.     Current Outpatient Medications on File Prior to Encounter   Medication Sig    ACCU-CHEK GUIDE TEST STRIPS Strp TEST BLOOD SUGAR THREE TIMES DAILY    ACCU-CHEK SOFT DEV LANCETS Kit     albuterol (PROVENTIL/VENTOLIN HFA) 90 mcg/actuation inhaler Inhale 2 puffs by mouth into the lungs every 4 (four) hours as needed for Wheezing. Rescue    amLODIPine (NORVASC) 10 MG tablet Take 1 tablet (10 mg total) by mouth once daily. (Patient taking differently: Take 10 mg by mouth every evening.)    apixaban (ELIQUIS) 5 mg Tab Take 1 tablet (5 mg total) by mouth 2 (two) times daily.    atorvastatin (LIPITOR) 40 MG tablet Take 1 tablet (40 mg total) by mouth once daily.    BD ULTRA-FINE OLU PEN NEEDLE 32 gauge x 5/32" Ndle Uses 4 times daily, on multiple daily insulin injections    benzonatate (TESSALON) 100 MG capsule Take 1 capsule (100 mg total) by mouth 3 (three) times daily as needed for Cough.    blood-glucose meter kit Use as instructed    carvediloL (COREG) 25 MG tablet Take 1 tablet (25 mg total) by mouth 2 (two) times daily.    cholecalciferol, vitamin D3, 125 mcg (5,000 unit) capsule Take 5,000 Units by mouth once daily.    DROPLET PEN NEEDLE 29 gauge x 1/2" Ndle USE FOUR TIMES DAILY    DROPSAFE ALCOHOL PREP PADS PadM USE TOPICALLY TO TEST BLOOD SUGAR FOUR TIMES DAILY    dulaglutide (TRULICITY) 0.75 mg/0.5 mL pen injector Inject 0.75 mg into the skin every 7 days.    DULoxetine (CYMBALTA) 60 MG capsule Take 1 capsule (60 mg total) by mouth 2 (two) times daily. Please hold until " you follow up with your primary care provider    fluticasone propionate (FLONASE) 50 mcg/actuation nasal spray Instill 2 sprays in each nostril once daily.    furosemide (LASIX) 20 MG tablet Take 1 tablet by mouth on Monday, Wednesday, and Friday    insulin (LANTUS SOLOSTAR U-100 INSULIN) glargine 100 units/mL SubQ pen Inject 40 Units into the skin once daily. (Patient taking differently: Inject 40 Units into the skin every evening.)    insulin aspart U-100 (NOVOLOG) 100 unit/mL (3 mL) InPn pen Inject 15 units before meals, hold if sugar is less than 100. Max daily 45 units.    lancets (ACCU-CHEK SOFTCLIX LANCETS) Misc TEST BLOOD SUGAR FOUR TIMES DAILY    olopatadine (PATADAY) 0.2 % Drop Place 1 drop into both eyes once daily.    oxyCODONE-acetaminophen (PERCOCET)  mg per tablet Take 1 tablet by mouth 5 (five) times daily.    pantoprazole (PROTONIX) 40 MG tablet Take 1 tablet (40 mg total) by mouth once daily.    rOPINIRole (REQUIP) 0.5 MG tablet Take 1 tablet (0.5 mg total) by mouth every evening.    [DISCONTINUED] amoxicillin-clavulanate 875-125mg (AUGMENTIN) 875-125 mg per tablet Take 1 tablet by mouth every 12 (twelve) hours. for 7 days    [DISCONTINUED] azithromycin (Z-DARSHANA) 250 MG tablet Take 2 tablets by mouth on day 1; Take 1 tablet by mouth on days 2-5    [DISCONTINUED] ipratropium (ATROVENT HFA) 17 mcg/actuation inhaler Inhale 2 puffs into the lungs every 6 (six) hours. Rescue    [DISCONTINUED] ipratropium (ATROVENT) 0.02 % nebulizer solution Take 2.5 mLs (500 mcg total) by nebulization 4 (four) times daily. Rescue     Family History       Problem Relation (Age of Onset)    Alcohol abuse Brother    Breast cancer Sister, Maternal Aunt    Cancer Mother, Sister    Cirrhosis Brother    Dementia Brother, Brother    Diabetes Sister, Sister, Sister, Sister, Brother, Other    Drug abuse Brother, Brother    Frontotemporal dementia Brother    Heart attack Father, Sister    Heart disease Father     Hypertension Mother    Liver cancer Brother    Lupus Brother, Sister    No Known Problems Daughter, Son, Daughter, Son, Son, Maternal Grandmother, Maternal Grandfather, Paternal Grandmother, Paternal Grandfather, Maternal Uncle, Paternal Aunt, Paternal Uncle    Ovarian cancer Other    Sleep apnea Sister          Tobacco Use    Smoking status: Former     Current packs/day: 0.00     Average packs/day: 1 pack/day for 15.0 years (15.0 ttl pk-yrs)     Types: Cigarettes     Start date: 1985     Quit date: 2000     Years since quittin.1    Smokeless tobacco: Never   Substance and Sexual Activity    Alcohol use: No    Drug use: No    Sexual activity: Never     Review of Systems   Constitutional:  Positive for activity change, appetite change, chills and fatigue. Negative for fever and unexpected weight change.   HENT:  Negative for congestion and sore throat.    Respiratory:  Positive for cough and shortness of breath.    Cardiovascular:  Negative for chest pain, palpitations and leg swelling.   Gastrointestinal:  Negative for abdominal distention, abdominal pain, blood in stool, constipation, diarrhea, nausea and vomiting.   Genitourinary:  Negative for difficulty urinating, dysuria and hematuria.   Musculoskeletal:  Positive for arthralgias. Negative for myalgias.   Skin:  Negative for color change and rash.   Neurological:  Negative for dizziness, tremors and seizures.     Objective:     Vital Signs (Most Recent):  Temp: 100.3 °F (37.9 °C) (09/15/23 1615)  Pulse: 75 (09/15/23 1845)  Resp: (!) 21 (09/15/23 1845)  BP: (!) 179/81 (09/15/23 1845)  SpO2: 96 % (09/15/23 1845) Vital Signs (24h Range):  Temp:  [99.6 °F (37.6 °C)-100.3 °F (37.9 °C)] 100.3 °F (37.9 °C)  Pulse:  [75-90] 75  Resp:  [18-21] 21  SpO2:  [94 %-99 %] 96 %  BP: (125-179)/(67-81) 179/81     Weight: 90.7 kg (200 lb)  Body mass index is 30.41 kg/m².     Physical Exam  Vitals reviewed.   Constitutional:       General: She is not in acute  distress.     Appearance: She is well-developed.   HENT:      Head: Normocephalic and atraumatic.   Eyes:      Extraocular Movements: Extraocular movements intact.      Pupils: Pupils are equal, round, and reactive to light.   Neck:      Vascular: No JVD.      Trachea: No tracheal deviation.   Cardiovascular:      Rate and Rhythm: Normal rate and regular rhythm.      Heart sounds: No murmur heard.     No friction rub. No gallop.   Pulmonary:      Effort: Respiratory distress present.      Breath sounds: Wheezing present. No rales.   Abdominal:      General: Bowel sounds are normal. There is no distension.      Palpations: Abdomen is soft. There is no mass.      Tenderness: There is no abdominal tenderness.   Musculoskeletal:         General: No deformity.      Cervical back: Neck supple.   Lymphadenopathy:      Cervical: No cervical adenopathy.   Skin:     General: Skin is warm and dry.      Findings: No rash.   Neurological:      Mental Status: She is alert and oriented to person, place, and time.              CRANIAL NERVES     CN III, IV, VI   Pupils are equal, round, and reactive to light.       Significant Labs: All pertinent labs within the past 24 hours have been reviewed.    Significant Imaging: I have reviewed all pertinent imaging results/findings within the past 24 hours.    Assessment/Plan:     * Community acquired pneumonia  -Pt. With productive cough, SOB, chills, low grade fevers, and elevated WBC and procal. Covid negative  -CXR without focal consolidation but concerned for CAP given constellation of symptoms  -Plan to treat with IV ceftriaxone and azitrhomycin, f/u sputum cultures and monitor closely      Hypokalemia  -K+ 3.0 on admit, hold home lasix and replete PRN. F/u repeat BMP and Mg    History of DVT (deep vein thrombosis)  -Continue home eliquis      Type 2 diabetes mellitus with stage 3 chronic kidney disease, with long-term current use of insulin  -A1c 6.4, well-controlled. Home regimen  lantus 40 units, aspart 15 units TIDWM and trulicity Q7 days  -PLan to use levemir 25 units daily and SSI while admitted, titrate up as needed    Stage 3a chronic kidney disease  -Cr 1.6 on admit, slightly increased from baseline ~1.3. Suspect prerenal etiology in setting of sepsis/infection with poor PO intake, although pt. Does have known hx of nephrolithiasis, so renal U/S ordered  -Plan to gently hydrate given hx of HF and hold lasix for now  -Continue to monitor Cr while admitted    Chronic diastolic heart failure  -Pt. With mild HANNAH, dry mucous membranes, BNP <100. Suspect volume down  -Hold lasix, gently hydrate. Will repeat TTE given pt. Reports of syncope/falls and last in syest >1 yr ago, f/u repeat    MARLON on CPAP  -Continue home CPAP QHS        VTE Risk Mitigation (From admission, onward)         Ordered     apixaban tablet 5 mg  2 times daily         09/15/23 2111     IP VTE HIGH RISK PATIENT  Once         09/15/23 1834     Place sequential compression device  Until discontinued         09/15/23 1834                   On 09/15/2023, patient should be placed in hospital observation services under my care.        Jimbo Sadler MD  Department of Hospital Medicine  [facility] - Emergency Dept

## 2023-09-16 NOTE — ASSESSMENT & PLAN NOTE
- K 3.0 on admit, hold home lasix and replete PRN. F/u repeat BMP and Mg  - 9/16 - K 2.9, replaced potassium and mag jump

## 2023-09-16 NOTE — PROGRESS NOTES
Pharmacist Intervention IV to PO Note    Henrietta Villasenor is a 83 y.o. female being treated with IV medication azithromycin    Patient Data:    Vital Signs (Most Recent):  Temp: 97.9 °F (36.6 °C) (09/16/23 0343)  Pulse: 71 (09/16/23 0343)  Resp: 18 (09/16/23 0343)  BP: 138/60 (09/16/23 0343)  SpO2: (!) 94 % (09/16/23 0343) Vital Signs (72h Range):  Temp:  [97.9 °F (36.6 °C)-100.3 °F (37.9 °C)]   Pulse:  [71-90]   Resp:  [18-23]   BP: (125-187)/(60-81)   SpO2:  [93 %-99 %]      CBC:  Recent Labs   Lab 09/15/23  1649 09/16/23  0549   WBC 18.60* 16.23*   RBC 4.46 3.77*   HGB 11.8* 9.9*   HCT 35.6* 31.3*    262   MCV 80* 83   MCH 26.5* 26.3*   MCHC 33.1 31.6*     CMP:     Recent Labs   Lab 09/15/23  1649 09/16/23  0549   * 110   CALCIUM 9.9 9.3   ALBUMIN 3.0* 2.6*   PROT 8.0 6.8   * 137   K 3.0* 2.9*   CO2 19* 24    102   BUN 20 15   CREATININE 1.6* 1.0   ALKPHOS 104 93   ALT 26 27   AST 33 39   BILITOT 0.5 0.7       Dietary Orders:  Diet Orders            Diet diabetic 2000 Calorie: Diabetic starting at 09/15 1837            Based on the following criteria, this patient qualifies for intravenous to oral conversion:  [x] The patients gastrointestinal tract is functioning (tolerating medications via oral or enteral route for 24 hours and tolerating food or enteral feeds for 24 hours).  [x] The patient is hemodynamically stable for 24 hours (heart rate <100 beats per minute, systolic blood pressure >99 mm Hg, and respiratory rate <20 breaths per minute).  [x] The patient shows clinical improvement (afebrile for at least 24 hours and white blood cell count downtrending or normalized). Additionally, the patient must be non-neutropenic (absolute neutrophil count >500 cells/mm3).  [x] For antimicrobials, the patient has received IV therapy for at least 24 hours.    IV medication azithromycin will be changed to oral medication azithromycin    Pharmacist's Name: Elen Ndiaye  Pharmacist's Extension:  [phone]

## 2023-09-16 NOTE — ASSESSMENT & PLAN NOTE
- productive cough, SOB, chills, low grade fevers, and elevated WBC and procal. Covid negative.  - CXR without focal consolidation but concerned for CAP given constellation of symptoms  - cont IV rocephin and azithromycin  - f/u sputum cultures

## 2023-09-16 NOTE — SUBJECTIVE & OBJECTIVE
Interval History: MARIELLA, PIERCESAF. WBC 18 >> 16. K 2.9 on AM labs. Received KCl and mag jump. Continue azithromycin and ceftriaxone.     Review of Systems   Constitutional:  Positive for activity change, appetite change, chills and fatigue. Negative for fever.   Respiratory:  Positive for cough and shortness of breath. Negative for chest tightness.    Cardiovascular:  Negative for chest pain and leg swelling.   Gastrointestinal:  Negative for abdominal pain and nausea.   Musculoskeletal:  Positive for arthralgias.   Neurological:  Negative for dizziness and weakness.     Objective:     Vital Signs (Most Recent):  Temp: 98.1 °F (36.7 °C) (09/16/23 0802)  Pulse: 67 (09/16/23 0802)  Resp: 16 (09/16/23 0802)  BP: (!) 118/57 (09/16/23 0802)  SpO2: (!) 90 % (09/16/23 0802) Vital Signs (24h Range):  Temp:  [97.9 °F (36.6 °C)-100.3 °F (37.9 °C)] 98.1 °F (36.7 °C)  Pulse:  [67-90] 67  Resp:  [16-23] 16  SpO2:  [90 %-99 %] 90 %  BP: (118-187)/(57-81) 118/57     Weight: 91.2 kg (201 lb)  Body mass index is 30.56 kg/m².    Intake/Output Summary (Last 24 hours) at 9/16/2023 1048  Last data filed at 9/15/2023 2130  Gross per 24 hour   Intake 100 ml   Output --   Net 100 ml         Physical Exam  Vitals and nursing note reviewed.   Constitutional:       Appearance: She is well-developed.   Eyes:      Pupils: Pupils are equal, round, and reactive to light.   Cardiovascular:      Rate and Rhythm: Normal rate and regular rhythm.   Pulmonary:      Effort: Pulmonary effort is normal. No respiratory distress.      Breath sounds: Wheezing present.   Abdominal:      Palpations: Abdomen is soft.      Tenderness: There is no abdominal tenderness.   Musculoskeletal:         General: No tenderness.   Skin:     General: Skin is warm and dry.   Neurological:      Mental Status: She is alert and oriented to person, place, and time.   Psychiatric:         Behavior: Behavior normal.             Significant Labs: All pertinent labs within the past 24  hours have been reviewed.  CBC:   Recent Labs   Lab 09/15/23  1649 09/16/23  0549   WBC 18.60* 16.23*   HGB 11.8* 9.9*   HCT 35.6* 31.3*    262     CMP:   Recent Labs   Lab 09/15/23  1649 09/16/23  0549   * 137   K 3.0* 2.9*    102   CO2 19* 24   * 110   BUN 20 15   CREATININE 1.6* 1.0   CALCIUM 9.9 9.3   PROT 8.0 6.8   ALBUMIN 3.0* 2.6*   BILITOT 0.5 0.7   ALKPHOS 104 93   AST 33 39   ALT 26 27   ANIONGAP 16 11       Significant Imaging: I have reviewed all pertinent imaging results/findings within the past 24 hours.

## 2023-09-16 NOTE — PLAN OF CARE
Problem: Occupational Therapy  Goal: Occupational Therapy Goal  Description: Goals to be met by: 10/7/23     Patient will increase functional independence with ADLs by performing:    UE Dressing with [place].  LE Dressing with [place].  Grooming while standing with Supervision.  Toileting from toilet with [place] for hygiene and clothing management.   Bathing from  shower chair/bench with Modified [place].  Toilet transfer to toilet with Modified [place].    Outcome: Ongoing, Progressing   OT role and purpose, POC

## 2023-09-16 NOTE — ASSESSMENT & PLAN NOTE
- mild HANNAH, dry mucous membranes, BNP <100. Suspect volume down.  - hold lasix, gently hydrate  - repeat Echo with EF 60-65%, CVP 3

## 2023-09-16 NOTE — PROGRESS NOTES
[facility] - [facility]ation [address] [place] [address] Medicine  Progress Note    Patient Name: Henrietta Villasenor  MRN: [id number]  Patient Class: OP- Observation   Admission Date: 9/15/2023  Length of Stay: 0 days  Attending Physician: Darwin Ramírez MD  Primary Care Provider: Jeannine Arriaga MD        Subjective:     Principal Problem:Community acquired pneumonia        HPI:  84 yo F with PMHx DM2, CKD3, HTN, HFpEF who presents to the ED from IM clinic today for worsening weakness and SOB x 2-3 days. Pt. Reports that she developed first symptoms of a productive cough about 2 weeks ago. Cough initially dry but has progressed to involve green mucus. Pt. Was seen in an urgent care for this cough and prescriebd tessalon, but the cough did not improve. Over the last 2-3 days, the patient has developed significant fatigue and SOB which concerned her. Pt. Lives alone, and She has had difficulty getting around her house. She also reports a couple of falls. Yesterday, she reports developing some chills as well. She scheduled an appointment with her doctor today, and had wheezing/significant increased work of breathing on exam, so she was referred to the ED for further work-up.      Overview/Hospital Course:  Henrietta Villasenor is placed in  Observation for management of community acquired pneumonia. CXR with concern edema or pneumonitis, no focal consolidation. US retroperitoneal without hydronephrosis, suggestive of medical renal disease. Echo with EF 60-65, CVP 3. Started on broad spectrum antibiotic coverage for CAP. Replacing electrolytes.       Interval History: NAEON, VSSAF. WBC 18 >> 16. K 2.9 on AM labs. Received KCl and mag jump. Continue azithromycin and ceftriaxone.     Review of Systems   Constitutional:  Positive for activity change, appetite change, chills and fatigue. Negative for fever.   Respiratory:  Positive for cough and shortness of breath. Negative for chest tightness.    Cardiovascular:  Negative for chest pain and leg  swelling.   Gastrointestinal:  Negative for abdominal pain and nausea.   Musculoskeletal:  Positive for arthralgias.   Neurological:  Negative for dizziness and weakness.     Objective:     Vital Signs (Most Recent):  Temp: 98.1 °F (36.7 °C) (09/16/23 0802)  Pulse: 67 (09/16/23 0802)  Resp: 16 (09/16/23 0802)  BP: (!) 118/57 (09/16/23 0802)  SpO2: (!) 90 % (09/16/23 0802) Vital Signs (24h Range):  Temp:  [97.9 °F (36.6 °C)-100.3 °F (37.9 °C)] 98.1 °F (36.7 °C)  Pulse:  [67-90] 67  Resp:  [16-23] 16  SpO2:  [90 %-99 %] 90 %  BP: (118-187)/(57-81) 118/57     Weight: 91.2 kg (201 lb)  Body mass index is 30.56 kg/m².    Intake/Output Summary (Last 24 hours) at 9/16/2023 1048  Last data filed at 9/15/2023 2130  Gross per 24 hour   Intake 100 ml   Output --   Net 100 ml         Physical Exam  Vitals and nursing note reviewed.   Constitutional:       Appearance: She is well-developed.   Eyes:      Pupils: Pupils are equal, round, and reactive to light.   Cardiovascular:      Rate and Rhythm: Normal rate and regular rhythm.   Pulmonary:      Effort: Pulmonary effort is normal. No respiratory distress.      Breath sounds: Wheezing present.   Abdominal:      Palpations: Abdomen is soft.      Tenderness: There is no abdominal tenderness.   Musculoskeletal:         General: No tenderness.   Skin:     General: Skin is warm and dry.   Neurological:      Mental Status: She is alert and oriented to person, place, and time.   Psychiatric:         Behavior: Behavior normal.             Significant Labs: All pertinent labs within the past 24 hours have been reviewed.  CBC:   Recent Labs   Lab 09/15/23  1649 09/16/23  0549   WBC 18.60* 16.23*   HGB 11.8* 9.9*   HCT 35.6* 31.3*    262     CMP:   Recent Labs   Lab 09/15/23  1649 09/16/23  0549   * 137   K 3.0* 2.9*    102   CO2 19* 24   * 110   BUN 20 15   CREATININE 1.6* 1.0   CALCIUM 9.9 9.3   PROT 8.0 6.8   ALBUMIN 3.0* 2.6*   BILITOT 0.5 0.7   ALKPHOS 104 93    AST 33 39   ALT 26 27   ANIONGAP 16 11       Significant Imaging: I have reviewed all pertinent imaging results/findings within the past 24 hours.      Assessment/Plan:      * Community acquired pneumonia  - productive cough, SOB, chills, low grade fevers, and elevated WBC and procal. Covid negative.  - CXR without focal consolidation but concerned for CAP given constellation of symptoms  - cont IV rocephin and azithromycin  - f/u sputum cultures     Hypokalemia  - K 3.0 on admit, hold home lasix and replete PRN. F/u repeat BMP and Mg  - 9/16 - K 2.9, replaced potassium and mag jump    History of DVT (deep vein thrombosis)  - Continue home eliquis    Type 2 diabetes mellitus with stage 3 chronic kidney disease, with long-term current use of insulin  - A1c 6.4, well-controlled. Home regimen lantus 40 units, aspart 15 units TIDWM and trulicity Q7 days  - levemir 25 units daily and SSI while admitted, titrate up as needed    Stage 3a chronic kidney disease  - Cr 1.6 on admit, slightly increased from baseline ~1.3. Suspect prerenal etiology in setting of sepsis/infection with poor PO intake, although patient does have known hx of nephrolithiasis, so renal U/S ordered   -- renal US without evidence of hydronephrosis  - Plan to gently hydrate given hx of HF and hold lasix for now  - Continue to monitor Cr while admitted    Chronic diastolic heart failure  - mild HANNAH, dry mucous membranes, BNP <100. Suspect volume down.  - hold lasix, gently hydrate  - repeat Echo with EF 60-65%, CVP 3     MARLON on CPAP  - cont home CPAP QHS      VTE Risk Mitigation (From admission, onward)         Ordered     apixaban tablet 5 mg  2 times daily         09/15/23 2111     IP VTE HIGH RISK PATIENT  Once         09/15/23 1834     Place sequential compression device  Until discontinued         09/15/23 1834                Discharge Planning   KELLY: 9/17/2023     Code Status: Full Code   Is the patient medically ready for discharge?: No     Reason for patient still in hospital (select all that apply): Patient trending condition and Treatment                     Kuldeep Berry PA-C  Department of Hospital Medicine   [facility] - [facility] 11H

## 2023-09-17 VITALS
HEART RATE: 71 BPM | BODY MASS INDEX: 30.46 KG/M2 | HEIGHT: 68 IN | TEMPERATURE: 98 F | DIASTOLIC BLOOD PRESSURE: 77 MMHG | OXYGEN SATURATION: 96 % | WEIGHT: 201 LBS | SYSTOLIC BLOOD PRESSURE: 157 MMHG | RESPIRATION RATE: 17 BRPM

## 2023-09-17 LAB
ALBUMIN SERPL BCP-MCNC: 2.6 G/DL (ref 3.5–5.2)
ALP SERPL-CCNC: 95 U/L (ref 55–135)
ALT SERPL W/O P-5'-P-CCNC: 32 U/L (ref 10–44)
ANION GAP SERPL CALC-SCNC: 8 MMOL/L (ref 8–16)
AST SERPL-CCNC: 39 U/L (ref 10–40)
BASOPHILS # BLD AUTO: 0.06 K/UL (ref 0–0.2)
BASOPHILS NFR BLD: 0.5 % (ref 0–1.9)
BILIRUB SERPL-MCNC: 0.4 MG/DL (ref 0.1–1)
BUN SERPL-MCNC: 14 MG/DL (ref 8–23)
CALCIUM SERPL-MCNC: 9.4 MG/DL (ref 8.7–10.5)
CHLORIDE SERPL-SCNC: 104 MMOL/L (ref 95–110)
CO2 SERPL-SCNC: 27 MMOL/L (ref 23–29)
CREAT SERPL-MCNC: 1 MG/DL (ref 0.5–1.4)
DIFFERENTIAL METHOD: ABNORMAL
EOSINOPHIL # BLD AUTO: 0.4 K/UL (ref 0–0.5)
EOSINOPHIL NFR BLD: 3.3 % (ref 0–8)
ERYTHROCYTE [DISTWIDTH] IN BLOOD BY AUTOMATED COUNT: 15.1 % (ref 11.5–14.5)
EST. GFR  (NO RACE VARIABLE): 55.9 ML/MIN/1.73 M^2
GLUCOSE SERPL-MCNC: 132 MG/DL (ref 70–110)
HCT VFR BLD AUTO: 32.3 % (ref 37–48.5)
HGB BLD-MCNC: 10 G/DL (ref 12–16)
IMM GRANULOCYTES # BLD AUTO: 0.05 K/UL (ref 0–0.04)
IMM GRANULOCYTES NFR BLD AUTO: 0.4 % (ref 0–0.5)
LYMPHOCYTES # BLD AUTO: 2.5 K/UL (ref 1–4.8)
LYMPHOCYTES NFR BLD: 19.3 % (ref 18–48)
MAGNESIUM SERPL-MCNC: 1.9 MG/DL (ref 1.6–2.6)
MCH RBC QN AUTO: 25.8 PG (ref 27–31)
MCHC RBC AUTO-ENTMCNC: 31 G/DL (ref 32–36)
MCV RBC AUTO: 83 FL (ref 82–98)
MONOCYTES # BLD AUTO: 1.4 K/UL (ref 0.3–1)
MONOCYTES NFR BLD: 10.3 % (ref 4–15)
NEUTROPHILS # BLD AUTO: 8.7 K/UL (ref 1.8–7.7)
NEUTROPHILS NFR BLD: 66.2 % (ref 38–73)
NRBC BLD-RTO: 0 /100 WBC
PHOSPHATE SERPL-MCNC: 2.8 MG/DL (ref 2.7–4.5)
PLATELET # BLD AUTO: 278 K/UL (ref 150–450)
PMV BLD AUTO: 9.7 FL (ref 9.2–12.9)
POCT GLUCOSE: 113 MG/DL (ref 70–110)
POTASSIUM SERPL-SCNC: 3.6 MMOL/L (ref 3.5–5.1)
PROT SERPL-MCNC: 7 G/DL (ref 6–8.4)
RBC # BLD AUTO: 3.88 M/UL (ref 4–5.4)
SODIUM SERPL-SCNC: 139 MMOL/L (ref 136–145)
WBC # BLD AUTO: 13.09 K/UL (ref 3.9–12.7)

## 2023-09-17 PROCEDURE — 99239 PR HOSPITAL DISCHARGE DAY,>30 MIN: ICD-10-PCS | Mod: HCNC,,,

## 2023-09-17 PROCEDURE — 36415 COLL VENOUS BLD VENIPUNCTURE: CPT | Mod: HCNC | Performed by: HOSPITALIST

## 2023-09-17 PROCEDURE — 97535 SELF CARE MNGMENT TRAINING: CPT | Mod: HCNC

## 2023-09-17 PROCEDURE — 84100 ASSAY OF PHOSPHORUS: CPT | Mod: HCNC

## 2023-09-17 PROCEDURE — 94761 N-INVAS EAR/PLS OXIMETRY MLT: CPT | Mod: HCNC

## 2023-09-17 PROCEDURE — 97530 THERAPEUTIC ACTIVITIES: CPT | Mod: HCNC

## 2023-09-17 PROCEDURE — G0378 HOSPITAL OBSERVATION PER HR: HCPCS | Mod: HCNC

## 2023-09-17 PROCEDURE — 80053 COMPREHEN METABOLIC PANEL: CPT | Mod: HCNC | Performed by: HOSPITALIST

## 2023-09-17 PROCEDURE — 99900035 HC TECH TIME PER 15 MIN (STAT): Mod: HCNC

## 2023-09-17 PROCEDURE — 83735 ASSAY OF MAGNESIUM: CPT | Mod: HCNC

## 2023-09-17 PROCEDURE — 99499 UNLISTED E&M SERVICE: CPT | Mod: HCNC,,, | Performed by: INTERNAL MEDICINE

## 2023-09-17 PROCEDURE — 85025 COMPLETE CBC W/AUTO DIFF WBC: CPT | Mod: HCNC | Performed by: HOSPITALIST

## 2023-09-17 PROCEDURE — 25000003 PHARM REV CODE 250: Mod: HCNC | Performed by: HOSPITALIST

## 2023-09-17 PROCEDURE — 94660 CPAP INITIATION&MGMT: CPT | Mod: HCNC

## 2023-09-17 PROCEDURE — 97162 PT EVAL MOD COMPLEX 30 MIN: CPT | Mod: HCNC

## 2023-09-17 PROCEDURE — 99239 HOSP IP/OBS DSCHRG MGMT >30: CPT | Mod: HCNC,,,

## 2023-09-17 PROCEDURE — 99499 NO LOS: ICD-10-PCS | Mod: HCNC,,, | Performed by: INTERNAL MEDICINE

## 2023-09-17 RX ORDER — AMOXICILLIN AND CLAVULANATE POTASSIUM 562.5; 437.5; 62.5 MG/1; MG/1; MG/1
2 TABLET, FILM COATED, EXTENDED RELEASE ORAL 2 TIMES DAILY
Qty: 28 TABLET | Refills: 0 | Status: SHIPPED | OUTPATIENT
Start: 2023-09-17 | End: 2023-09-21

## 2023-09-17 RX ORDER — AZITHROMYCIN 250 MG/1
500 TABLET, FILM COATED ORAL EVERY 24 HOURS
Status: CANCELLED | OUTPATIENT
Start: 2023-09-17 | End: 2023-09-18

## 2023-09-17 RX ORDER — AZITHROMYCIN 250 MG/1
250 TABLET, FILM COATED ORAL DAILY
Qty: 3 TABLET | Refills: 0 | Status: SHIPPED | OUTPATIENT
Start: 2023-09-17 | End: 2023-10-04

## 2023-09-17 RX ADMIN — GUAIFENESIN 600 MG: 600 TABLET, EXTENDED RELEASE ORAL at 09:09

## 2023-09-17 RX ADMIN — DULOXETINE HYDROCHLORIDE 60 MG: 60 CAPSULE, DELAYED RELEASE ORAL at 09:09

## 2023-09-17 RX ADMIN — PANTOPRAZOLE SODIUM 40 MG: 40 TABLET, DELAYED RELEASE ORAL at 09:09

## 2023-09-17 RX ADMIN — CARVEDILOL 25 MG: 25 TABLET, FILM COATED ORAL at 09:09

## 2023-09-17 RX ADMIN — APIXABAN 5 MG: 5 TABLET, FILM COATED ORAL at 09:09

## 2023-09-17 RX ADMIN — ATORVASTATIN CALCIUM 40 MG: 40 TABLET, FILM COATED ORAL at 09:09

## 2023-09-17 NOTE — DISCHARGE SUMMARY
[facility] - Observation [address] [place] [address] Medicine  Discharge Summary      Patient Name: Henrietta Villasenor  MRN: [id number]  [facility] [id number]  Patient Class: OP- Observation  Admission Date: 9/15/2023  Hospital Length of Stay: 0 days  Discharge Date and Time:  09/17/2023 12:59 PM  Attending Physician: Darwin Ramírez MD   Discharging Provider: Kuldeep Berry PA-C  Primary Care Provider: Jeannine Arriaga MD  [facility] Medicine Team: [facility] Kuldeep Berry PA-C  Primary Care Team: [facility]    HPI:   84 yo F with PMHx DM2, CKD3, HTN, HFpEF who presents to the ED from IM clinic today for worsening weakness and SOB x 2-3 days. Pt. Reports that she developed first symptoms of a productive cough about 2 weeks ago. Cough initially dry but has progressed to involve green mucus. Pt. Was seen in an urgent care for this cough and prescriebd tessalon, but the cough did not improve. Over the last 2-3 days, the patient has developed significant fatigue and SOB which concerned her. Pt. Lives alone, and She has had difficulty getting around her house. She also reports a couple of falls. Yesterday, she reports developing some chills as well. She scheduled an appointment with her doctor today, and had wheezing/significant increased work of breathing on exam, so she was referred to the ED for further work-up.      * No surgery found *      Hospital Course:   Henrietta Villasenor is placed in  Observation for management of community acquired pneumonia. CXR with concern edema or pneumonitis, no focal consolidation. US retroperitoneal without hydronephrosis, suggestive of medical renal disease. Echo with EF 60-65, CVP 3. Started on broad spectrum antibiotic coverage for CAP. Replacing electrolytes. Patient will discharge with oral augmentin and azithromycin to complete antibiotic coverage for CAP, medications sent to preferred pharmacy. Rolling walker ordered at discharge. She will follow up with PCP. Patient medically ready for discharge.  Plan of care discussed with patient, patient agreeable to plan, and all questions answered.         Goals of Care Treatment Preferences:  Code Status: Full Code      Consults:     No new Assessment & Plan notes have been filed under this hospital service since the last note was generated.  Service: Hospital Medicine    Final Active Diagnoses:    Diagnosis Date Noted POA    PRINCIPAL PROBLEM:  Community acquired pneumonia [J18.9] 05/04/2012 Yes    Hypokalemia [E87.6] 04/26/2023 Yes    History of DVT (deep vein thrombosis) [Z86.718] 04/26/2023 Not Applicable    Type 2 diabetes mellitus with stage 3 chronic kidney disease, with long-term current use of insulin [E11.22, N18.30, Z79.4] 08/31/2016 Not Applicable    Stage 3a chronic kidney disease [N18.31] 05/14/2014 Yes    Chronic diastolic heart failure [I50.32] 09/12/2012 Yes    MARLON on CPAP [G47.33, Z99.89]  Not Applicable     Chronic      Problems Resolved During this Admission:       Discharged Condition: stable    Disposition: Home or Self Care    Follow Up:   Follow-up Information       Jeannine Arriaga MD Follow up in 1 week(s).    Specialty: Internal Medicine  Contact information:  [address]  [place] [id number] [phone]               Gloria White RD, CDE .    Specialties: Diabetes, Endocrinology  Contact information:  [address] [facility] [id number] [phone]                           Patient Instructions:      Diet diabetic     Notify your health care provider if you experience any of the following:  temperature >100.4     Notify your health care provider if you experience any of the following:  difficulty breathing or increased cough     Notify your health care provider if you experience any of the following:  persistent dizziness, light-headedness, or visual disturbances     Notify your health care provider if you experience any of the following:  increased confusion or weakness     Activity as tolerated       Significant  "Diagnostic Studies: N/A    Pending Diagnostic Studies:       None           Medications:  Reconciled Home Medications:      Medication List        START taking these medications      amoxicillin-clavulanate 1,000-62.5 mg 1,000-62.5 mg per tablet  Commonly known as: AUGMENTIN XR  Take 2 tablets by mouth 2 (two) times a day.     azithromycin 250 MG tablet  Commonly known as: Z-DARSHANA  Take 1 tablet (250 mg total) by mouth once daily. Take 2 tablets by mouth on day 1; Take 1 tablet by mouth on days 2-5            CHANGE how you take these medications      amLODIPine 10 MG tablet  Commonly known as: NORVASC  Take 1 tablet (10 mg total) by mouth once daily.  What changed: when to take this     insulin glargine 100 units/mL SubQ pen  Commonly known as: LANTUS SOLOSTAR U-100 INSULIN  Inject 40 Units into the skin once daily.  What changed: when to take this            CONTINUE taking these medications      ACCU-CHEK GUIDE TEST STRIPS Strp  Generic drug: blood sugar diagnostic  TEST BLOOD SUGAR THREE TIMES DAILY     ACCU-CHEK SOFT DEV LANCETS Kit  Generic drug: lancing device with lancets     albuterol 90 mcg/actuation inhaler  Commonly known as: PROVENTIL/VENTOLIN HFA  Inhale 2 puffs by mouth into the lungs every 4 (four) hours as needed for Wheezing. Rescue     apixaban 5 mg Tab  Commonly known as: ELIQUIS  Take 1 tablet (5 mg total) by mouth 2 (two) times daily.     atorvastatin 40 MG tablet  Commonly known as: LIPITOR  Take 1 tablet (40 mg total) by mouth once daily.     * BD ULTRA-FINE OLU PEN NEEDLE 32 gauge x 5/32" Ndle  Generic drug: pen needle, diabetic  Uses 4 times daily, on multiple daily insulin injections     * DROPLET PEN NEEDLE 29 gauge x 1/2" Ndle  Generic drug: pen needle, diabetic  USE FOUR TIMES DAILY     benzonatate 100 MG capsule  Commonly known as: TESSALON  Take 1 capsule (100 mg total) by mouth 3 (three) times daily as needed for Cough.     blood-glucose meter kit  Use as instructed     carvediloL 25 " MG tablet  Commonly known as: COREG  Take 1 tablet (25 mg total) by mouth 2 (two) times daily.     cholecalciferol (vitamin D3) 125 mcg (5,000 unit) capsule  Take 5,000 Units by mouth once daily.     DROPSAFE ALCOHOL PREP PADS Padm  Generic drug: alcohol swabs  USE TOPICALLY TO TEST BLOOD SUGAR FOUR TIMES DAILY     DULoxetine 60 MG capsule  Commonly known as: CYMBALTA  Take 1 capsule (60 mg total) by mouth 2 (two) times daily. Please hold until you follow up with your primary care provider     fluticasone propionate 50 mcg/actuation nasal spray  Commonly known as: FLONASE  Instill 2 sprays in each nostril once daily.     furosemide 20 MG tablet  Commonly known as: LASIX  Take 1 tablet by mouth on Monday, Wednesday, and Friday     insulin aspart U-100 100 unit/mL (3 mL) Inpn pen  Commonly known as: NovoLOG  Inject 15 units before meals, hold if sugar is less than 100. Max daily 45 units.     lancets Misc  Commonly known as: ACCU-CHEK SOFTCLIX LANCETS  TEST BLOOD SUGAR FOUR TIMES DAILY     olopatadine 0.2 % Drop  Commonly known as: PATADAY  Place 1 drop into both eyes once daily.     oxyCODONE-acetaminophen  mg per tablet  Commonly known as: PERCOCET  Take 1 tablet by mouth 5 (five) times daily.     pantoprazole 40 MG tablet  Commonly known as: PROTONIX  Take 1 tablet (40 mg total) by mouth once daily.     rOPINIRole 0.5 MG tablet  Commonly known as: REQUIP  Take 1 tablet (0.5 mg total) by mouth every evening.     TRULICITY 0.75 mg/0.5 mL pen injector  Generic drug: dulaglutide  Inject 0.75 mg into the skin every 7 days.           * This list has 2 medication(s) that are the same as other medications prescribed for you. Read the directions carefully, and ask your doctor or other care provider to review them with you.                  Indwelling Lines/Drains at time of discharge:   Lines/Drains/Airways       None                   Time spent on the discharge of patient: 33 minutes         Kuldeep Berry  PALLAVI  Department of Hospital Medicine  [facility] - Observation 11H

## 2023-09-17 NOTE — PLAN OF CARE
Problem: Physical Therapy  Goal: Physical Therapy Goal  Description: Patient evaluated and discharged, no goals or care plan created.    Outcome: Met

## 2023-09-17 NOTE — PT/OT/SLP PROGRESS
Occupational Therapy   Treatment    Name: Henrietta Villasenor  MRN: [id number]  Admitting Diagnosis:  Community acquired pneumonia       Recommendations:     Discharge Recommendations: other (see comments)  Discharge Equipment Recommendations:  walker, rolling  Barriers to discharge:       Assessment:     Henrietta Villasenor is a 83 y.o. female with a medical diagnosis of Community acquired pneumonia.   Pt very pleasant and tolerated session well this date. Pt able to performing standing ADLs and functional mobility with SBA this date. Pt  with good safety awareness and endurance this date.  She presents with deficits listed below. Performance deficits affecting function are weakness, impaired functional mobility, gait instability.     Rehab Prognosis:  Good; patient would benefit from acute skilled OT services to address these deficits and reach maximum level of function.       Plan:     Patient to be seen 4 x/week to address the above listed problems via self-care/home management, therapeutic activities, therapeutic exercises  Plan of Care Expires: 10/06/23  Plan of Care Reviewed with: patient    Subjective     Chief Complaint: None  Patient/Family Comments/goals: receive treatment and return home  Pain/Comfort:  Pain Rating 1: 0/10    Objective:     Communicated with: BRENNEN Huynh prior to session.  Patient found HOB elevated with telemetry upon OT entry to room.    General Precautions: Standard, fall    Orthopedic Precautions:N/A  Braces: N/A  Respiratory Status: Room air     Occupational Performance:     Bed Mobility:    Patient completed Scooting/Bridging with supervision  Patient completed Supine to Sit with supervision  Patient completed Sit to Supine with supervision     Functional Mobility/Transfers:  Patient completed Sit <> Stand Transfer with supervision  with  no assistive device   Functional Mobility: Pt walked ~30' with SBA RW to and from  bathroom sink.     Activities of Daily Living:  Grooming: stand by assistance  while standing at sink for oral and facial care  Lower Body Dressing: contact guard assistance while seated EOB      AMPA 6 Click ADL: 24    Treatment & Education:  Role of OT and OT POC  Fall Prevention/Safety Awareness Training - RW management during functional mobility and standing ADLs to decrease risk of falls  Upperbody Dressing   Lowerbody Dressing on figure 4 technique to umair/doff clothing  to decrease forward trunk flexion and maintain balance while seated EOB  Educated on OOB activity with 1x staff A and impact on increasing functional independence.  Educated on importance of progressive mobilization to increase strength and endurance.      Patient left HOB elevated with all lines intact, call button in reach, and RN notified    GOALS:   Multidisciplinary Problems       Occupational Therapy Goals          Problem: Occupational Therapy    Goal Priority Disciplines Outcome Interventions   Occupational Therapy Goal     OT, PT/OT Ongoing, Progressing    Description: Goals to be met by: 10/7/23     Patient will increase functional independence with ADLs by performing:    UE Dressing with [place].  LE Dressing with [place].  Grooming while standing with Supervision.  Toileting from toilet with [place] for hygiene and clothing management.   Bathing from  shower chair/bench with Modified [place].  Toilet transfer to toilet with Modified [place].                         Time Tracking:     OT Date of Treatment: 09/17/23  OT Start Time: 1156  OT Stop Time: 1219  OT Total Time (min): 23 min    Billable Minutes:Self Care/Home Management 10  Therapeutic Activity 13    OT/NIR: OT          9/17/2023

## 2023-09-17 NOTE — PT/OT/SLP EVAL
"Physical Therapy Evaluation and Discharge Note    Patient Name:  Henrietta Villasenor   MRN:  [id number]    Recommendations:     Discharge Recommendations: other (see comments)  Discharge Equipment Recommendations: walker, rolling   Barriers to discharge: None    Assessment:     Henrietta Villasenor is a 83 y.o. female admitted with a medical diagnosis of Community acquired pneumonia. At this time, patient is functioning at their prior level of function and does not require further acute PT services. Patient performed bed mobility and transfers with no assistance and ambulated 30' with 2ww with supervision, no balance deficits noted. Patient does report some overall increased weakness recently; recommending discharge home with OP PT to address deficits and improve overall function.  Patient is safe to ambulate with nursing, encouraged to sit up in chair when able.      Recent Surgery: * No surgery found *      Plan:     During this hospitalization, patient does not require further acute PT services.  Please re-consult if situation changes.      Subjective     Chief Complaint: overall weakness  Patient/Family Comments/goals: "I think some PT might be nice so I can get even better" re: discharge recommendations   Pain/Comfort:  Pain Rating 1: 0/10  Pain Rating Post-Intervention 1: 0/10    Patients cultural, spiritual, Alevism conflicts given the current situation: no    Living Environment:  Patient lives alone in [facility] with [facility]. Her grandchildren come over occasionally to assist with anything she needs.  Prior to admission, patients level of function was modified independent.  Equipment used at home: cane, straight, shower chair.  DME owned (not currently used): none.  Upon discharge, patient will have assistance from grandchildren.    Objective:     Patient found HOB elevated with telemetry upon PT entry to room.    General Precautions: Standard, fall    Orthopedic Precautions:N/A   Braces: N/A  Respiratory Status: Room " air    Exams:  Cognitive Exam:  Patient is oriented to Person, Place, Time, and Situation  Sensation:    -       Intact  RLE ROM: WNL  RLE Strength: WNL  LLE ROM: WNL  LLE Strength: WNL    Functional Mobility:  Bed Mobility:     Scooting: independence  Supine to Sit: supervision  Transfers:     Sit to Stand:  stand by assistance with rolling walker  Gait: patient ambulated 30' with 2ww with supervision. No gait deviations noted.    Balance: standing balance fair. Patient able to  objects off floor with SBA.    AM-PAC 6 CLICK MOBILITY  Total Score:23       Treatment and Education:  Education: patient educated on POC, need for therapy, safety with mobility, discharge recommendations and equipment recommendations. Patient verbalized understanding of all topics.      AM-PAC 6 CLICK MOBILITY  Total Score:23     Patient left up in chair with all lines intact and call button in reach.    GOALS:   Multidisciplinary Problems       Physical Therapy Goals       Not on file              Multidisciplinary Problems (Resolved)          Problem: Physical Therapy    Goal Priority Disciplines Outcome Goal Variances Interventions   Physical Therapy Goal   (Resolved)     PT, PT/OT Met     Description: Patient evaluated and discharged, no goals or care plan created.                         History:     Past Medical History:   Diagnosis Date    Abnormality of lung 11/08/2011    Stable bandlike opacities at the lung bases, most likely representing      Anxiety     Arthritis     CAD (coronary artery disease)     Calculus of ureter 2/22/2011    CHF (congestive heart failure)     Chronic back pain 7/29/2012    Colon polyp 9/2010; 11/2010    Colon polyps 7/29/2012    Coronary artery disease involving native coronary artery of native heart with angina pectoris     Depression     Diabetes mellitus     Diabetes mellitus type II     Diverticulitis large intestine 7/27/2015    Diverticulitis of large intestine without perforation or abscess  without bleeding     Diverticulosis 09/25/2010; 11/02/2010; 11/08/2011; 7/29/2012    Duodenal disorder 08/25/2011    Duodenal erosion noted on EGD.    Duodenal ulcer, unspecified as acute or chronic, without hemorrhage, perforation, or obstruction 8/24/2011    E. coli sepsis 12/2010    Due to left ureteral stone with left nephrostomy tube - hospitalized in [place]    Esophageal dysmotility 01/24/2012    Noted on upper GI-barium swallow.    Facial weakness 1969    Left facial weakness s/p left mastoidectomy in ~ 1969.    Fatty liver 11/08/2011    Reported on CT-abdomen and in 06/2012 Gastro clinic visit note.    Gastric polyp 09/29/2010    GERD (gastroesophageal reflux disease) 7/29/2012    Hepatomegaly 11/08/2011    Reported on CT-abdomen    Herpes zoster with other nervous system complications(053.19) 2/28/2011    Hiatal hernia 06/26/2006; 09/29/2010; 08/25/2011    Noted on barium swallow 2006; noted on  EGD 2011.    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    Trouble in sleeping     Type II or unspecified type diabetes mellitus with neurological manifestations, not stated as uncontrolled(250.60)     Type II or unspecified type diabetes mellitus with peripheral circulatory disorders, not  stated as uncontrolled(250.70)     Type II or unspecified type diabetes mellitus with renal manifestations, not stated as uncontrolled(250.40)        Past Surgical History:   Procedure Laterality Date    BELPHAROPTOSIS REPAIR      s/p LAMBERTO levator repair - Dr. Dejesus    CARDIAC CATHETERIZATION      CARPAL TUNNEL RELEASE  3/13/2012    CATARACT EXTRACTION W/  INTRAOCULAR LENS IMPLANT Right 10/31/2018        CATARACT EXTRACTION W/  INTRAOCULAR LENS IMPLANT Left 03/22/2018        CHOLECYSTECTOMY      COLONOSCOPY N/A 11/16/2016    Procedure: COLONOSCOPY;  Surgeon: Chris Storey MD;  Location: [facility] ENDO (4TH FLR);  Service: Endoscopy;  Laterality: N/A;    COLONOSCOPY N/A 6/14/2017    Procedure: COLONOSCOPY;  Surgeon: Chris Storey MD;  Location: [facility] ENDO (4TH FLR);  Service: Endoscopy;  Laterality: N/A;  colonoscopy in 3 months with better bowel prep. - Split PEG prep ordered    CYSTOSCOPY  4/19/2023    Procedure: CYSTOSCOPY;  Surgeon: Domo Bates MD;  [facility];  Service: Urology;;    CYSTOSCOPY N/A 5/12/2023    Procedure: CYSTOSCOPY;  Surgeon: Laz Rudolph Jr., MD;  [facility];  Service: Urology;  Laterality: N/A;    CYSTOSCOPY N/A 7/7/2023    Procedure: CYSTOSCOPY;  Surgeon: Laz Rudolph Jr., MD;  [facility];  Service: Urology;  Laterality: N/A;    ESOPHAGOGASTRODUODENOSCOPY N/A 11/10/2021    Procedure: EGD (ESOPHAGOGASTRODUODENOSCOPY);  Surgeon: Kuldeep Velázquez MD;  Location: [facility] ENDO (4TH FLR);  Service: Endoscopy;  Laterality: N/A;  11/4-eliquis hold ok see te -tb-fully vacc-inst mail and verbal-    EXTRACORPOREAL SHOCK WAVE LITHOTRIPSY      EXTRACTION - STONE Right 5/12/2023    Procedure: EXTRACTION - STONE;  Surgeon: Laz Rudolph Jr., MD;  Location: [facility] OR 1ST FLR;  Service: Urology;  Laterality: Right;    INJECTION OF ANESTHETIC AGENT AROUND NERVE Bilateral 6/24/2020    Procedure: BLOCK, NERVE, C4-C5-C6 MEDIAL BRANCH ok  per McCoy to add on;  Surgeon: Oscar Ravi MD;  Location: [facility] MGT;  Service: Pain Management;  Laterality: Bilateral;    INTRAOCULAR PROSTHESES INSERTION Right 10/31/2018    Procedure: INSERTION-INTRAOCULAR LENS (IOL);  Surgeon: Keysha Valle MD;  Location: [facility] OR;  Service: Ophthalmology;  Laterality: Right;    JOINT REPLACEMENT      LASER LITHOTRIPSY Right 5/12/2023    Procedure: LITHOTRIPSY, USING LASER;  Surgeon: Laz Rudolph Jr., MD;  [facility];  Service: Urology;  Laterality: Right;    LASER LITHOTRIPSY Right 7/7/2023    Procedure: LITHOTRIPSY, USING LASER;  Surgeon: Laz Rudolph Jr., MD;  Location: [facility] OR [address] [place] [address];  Service: Urology;  Laterality: Right;    mastoid tumor removal  1969    Left mastoidectomy with residual left facial weakness.    NEPHROSTOMY      OOPHORECTOMY      PHACOEMULSIFICATION OF CATARACT Right 10/31/2018    Procedure: PHACOEMULSIFICATION-ASPIRATION-CATARACT;  Surgeon: Keysha Valle MD;  Location: [facility];  Service: Ophthalmology;  Laterality: Right;    PYELOSCOPY Right 5/12/2023    Procedure: PYELOSCOPY;  Surgeon: Laz Rudolph Jr., MD;  Location: [facility] OR [address] [place] [address];  Service: Urology;  Laterality: Right;    PYELOSCOPY Right 7/7/2023    Procedure: PYELOSCOPY;  Surgeon: Laz Rudolph Jr., MD;  [facility];  Service: Urology;  Laterality: Right;    REMOVAL, STENT, URETER Right 5/12/2023    Procedure: REMOVAL, STENT, URETER;  Surgeon: Laz Rudolph Jr., MD;  [facility];  Service: Urology;  Laterality: Right;    REMOVAL, STENT, URETER Right 7/7/2023    Procedure: REMOVAL, STENT, URETER;  Surgeon: Laz Rudolph Jr., MD;  [facility];  Service: Urology;  Laterality: Right;    RETROGRADE PYELOGRAPHY Right 4/19/2023    Procedure: PYELOGRAM, RETROGRADE;  Surgeon: Domo Bates MD;  [facility];  Service: Urology;  Laterality: Right;    TOTAL ABDOMINAL HYSTERECTOMY  1969    TAHBSO    TOTAL  KNEE ARTHROPLASTY  9/13/2012    Left    TRANSFORAMINAL EPIDURAL INJECTION OF STEROID Bilateral 9/17/2019    Procedure: INJECTION, STEROID, EPIDURAL, TRANSFORAMINAL APPROACH;  Surgeon: Oscar Ravi MD;  Location: [facility] PAIN MGT;  Service: Pain Management;  Laterality: Bilateral;  B/L TFESI L4/L5    TRANSFORAMINAL EPIDURAL INJECTION OF STEROID Bilateral 10/2/2020    Procedure: INJECTION, STEROID, EPIDURAL, TRANSFORAMINAL APPROACH;  Surgeon: Oscar Ravi MD;  Location: [facility] PAIN MGT;  Service: Pain Management;  Laterality: Bilateral;  B/L TFESI L4/5    TRANSFORAMINAL EPIDURAL INJECTION OF STEROID Bilateral 5/7/2021    Procedure: INJECTION, STEROID, EPIDURAL, TRANSFORAMINAL APPROACH, L4-L5 clear to hold Eliquis 3 days;  Surgeon: Oscar Ravi MD;  Location: [facility] PAIN MGT;  Service: Pain Management;  Laterality: Bilateral;    URETERAL STENT PLACEMENT Right 4/19/2023    Procedure: INSERTION, STENT, URETER;  Surgeon: Domo Bates MD;  Location: [facility] OR [address] [place] [address];  Service: Urology;  Laterality: Right;    URETERAL STENT PLACEMENT N/A 5/12/2023    Procedure: INSERTION, STENT, URETER;  Surgeon: Laz Rudolph Jr., MD;  [facility];  Service: Urology;  Laterality: N/A;    URETERAL STENT PLACEMENT Right 7/7/2023    Procedure: INSERTION, STENT, URETER;  Surgeon: Laz Rudolph Jr., MD;  [facility];  Service: Urology;  Laterality: Right;    URETEROSCOPY Right 5/12/2023    Procedure: URETEROSCOPY;  Surgeon: Laz Rudolph Jr., MD;  [facility];  Service: Urology;  Laterality: Right;    URETEROSCOPY Right 7/7/2023    Procedure: URETEROSCOPY;  Surgeon: Laz Rudolph Jr., MD;  [facility];  Service: Urology;  Laterality: Right;       Time Tracking:     PT Received On: 09/17/23  PT Start Time: 0914     PT Stop Time: 0926  PT Total Time (min): 12 min     Billable Minutes: Evaluation 12 minutes      09/17/2023

## 2023-09-18 NOTE — PROGRESS NOTES
Subjective:      Patient ID: Henrietta Villasenor is a 83 y.o. female.    Chief Complaint: Diabetic Foot Exam (Last DM PCP visit on 08/22/2023 Jeannine Arriaga MD)      Henrietta is a 83 y.o. female who presents to the clinic for evaluation and treatment of high risk feet. Henrietta has a past medical history of Abnormality of lung (11/08/2011), Anxiety, Arthritis, CAD (coronary artery disease), Calculus of ureter (2/22/2011), CHF (congestive heart failure), Chronic back pain (7/29/2012), Colon polyp (9/2010; 11/2010), Colon polyps (7/29/2012), Coronary artery disease involving native coronary artery of native heart with angina pectoris, Depression, Diabetes mellitus, Diabetes mellitus type II, Diverticulitis large intestine (7/27/2015), Diverticulitis of large intestine without perforation or abscess without bleeding, Diverticulosis (09/25/2010; 11/02/2010; 11/08/2011; 7/29/2012), Duodenal disorder (08/25/2011), Duodenal ulcer, unspecified as acute or chronic, without hemorrhage, perforation, or obstruction (8/24/2011), E. 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), 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), Obesity, MARLON (obstructive sleep apnea), PAD (peripheral artery disease) (2/4/2021), Pain, Peripheral neuropathy, Pneumonia, Polyneuropathy, Postherpetic neuralgia, Recurrent nephrolithiasis, S/P knee replacement (10/2/2012), S/P TKR (total knee replacement) (12/26/2012),  Sensorineural hearing loss of both ears, Thyroid disease (11/08/2011), Trouble in sleeping, Type II or unspecified type diabetes mellitus with neurological manifestations, not stated as uncontrolled(250.60), Type II or unspecified type diabetes mellitus with peripheral circulatory disorders, not stated as uncontrolled(250.70), and Type II or unspecified type diabetes mellitus with renal manifestations, not stated as uncontrolled(250.40). The patient's chief complaint is long, thick toenails. This patient has documented high risk feet requiring routine maintenance secondary to diabetes mellitis and those secondary complications of diabetes, as mentioned..       PCP: Jeannine Arriaga MD    Date Last Seen by PCP:   Chief Complaint   Patient presents with    Diabetic Foot Exam     Last DM PCP visit on 08/22/2023 Jeannine Arriaga MD         Current shoe gear: casual shoes    Hemoglobin A1C   Date Value Ref Range Status   09/08/2023 6.4 (H) 4.0 - 5.6 % Final     Comment:     ADA Screening Guidelines:  5.7-6.4%  Consistent with prediabetes  >or=6.5%  Consistent with diabetes    High levels of fetal hemoglobin interfere with the HbA1C  assay. Heterozygous hemoglobin variants (HbS, HgC, etc)do  not significantly interfere with this assay.   However, presence of multiple variants may affect accuracy.     03/08/2023 8.2 (H) 4.0 - 5.6 % Final     Comment:     ADA Screening Guidelines:  5.7-6.4%  Consistent with prediabetes  >or=6.5%  Consistent with diabetes    High levels of fetal hemoglobin interfere with the HbA1C  assay. Heterozygous hemoglobin variants (HbS, HgC, etc)do  not significantly interfere with this assay.   However, presence of multiple variants may affect accuracy.     09/09/2022 8.6 (H) 4.0 - 5.6 % Final     Comment:     ADA Screening Guidelines:  5.7-6.4%  Consistent with prediabetes  >or=6.5%  Consistent with diabetes    High levels of fetal hemoglobin interfere with the HbA1C  assay. Heterozygous  "hemoglobin variants (HbS, HgC, etc)do  not significantly interfere with this assay.   However, presence of multiple variants may affect accuracy.         Review of Systems   Constitutional: Negative for chills, decreased appetite and fever.   Cardiovascular:  Negative for leg swelling.   Skin:  Positive for color change, dry skin, nail changes and unusual hair distribution. Negative for flushing and itching.   Musculoskeletal:  Positive for arthritis and stiffness. Negative for back pain, falls, joint pain, joint swelling, muscle cramps and myalgias.   Gastrointestinal:  Negative for nausea and vomiting.   Neurological:  Positive for numbness and paresthesias. Negative for loss of balance.           Objective:       Vitals:    08/30/23 1413   BP: 130/87   Pulse: 78   Weight: 93.8 kg (206 lb 12.7 oz)   Height: 5' 8" (1.727 m)          Physical Exam  Vitals and nursing note reviewed.   Constitutional:       Appearance: She is well-developed.   Cardiovascular:      Comments: Dorsalis pedis and posterior tibial pulses are diminished Bilaterally. Toes are cool to touch. Feet are warm proximally.There is decreased digital hair. Skin is atrophic, slightly hyperpigmented, and mildly edematous      Musculoskeletal:         General: No tenderness. Normal range of motion.      Comments: Adequate joint range of motion without pain, limitation, nor crepitation Bilateral feet and ankle joints. Muscle strength is 5/5 in all groups bilaterally.      semi reducible IPJ digital contracture 2nd toe b/l    Feet:      Right foot:      Skin integrity: No ulcer.      Left foot:      Skin integrity: No ulcer.   Skin:     General: Skin is warm and dry.      Findings: No abrasion, bruising, burn, ecchymosis, erythema or lesion.      Comments: Nails x 10  are elongated by  4-8mm's, thickened by 2-3 mm's, dystrophic, and are darkened in  coloration . Xerosis Bilaterally. No open lesions noted.    Hyperkeratotic tissue noted to distal toes 2and " 4 b/l        Neurological:      Mental Status: She is alert and oriented to person, place, and time.      Comments: Murphy-Florence 5.07 monofilamant testing is diminished Mynor feet. Sharp/dull sensation diminished Bilaterally. Light touch absent Bilaterally.       Psychiatric:         Behavior: Behavior normal.               Assessment:       Encounter Diagnoses   Name Primary?    Type 2 diabetes mellitus with diabetic polyneuropathy, with long-term current use of insulin Yes    Onychomycosis due to dermatophyte     Corn or callus          Plan:       Henrietta was seen today for diabetic foot exam.    Diagnoses and all orders for this visit:    Type 2 diabetes mellitus with diabetic polyneuropathy, with long-term current use of insulin    Onychomycosis due to dermatophyte    Corn or callus      I counseled the patient on her conditions, their implications and medical management.    Shoe inspection. Diabetic Foot Education. Patient reminded of the importance of good nutrition and blood sugar control to help prevent podiatric complications of diabetes. Patient instructed on proper foot hygeine. We discussed wearing proper shoe gear, daily foot inspections, never walking without protective shoe gear, never putting sharp instruments to feet    - With patient's permission, nails were aggressively reduced and debrided x 10 to their soft tissue attachment mechanically and with electric , removing all offending nail and debris. Patient relates relief following the procedure. She will continue to monitor the areas daily, inspect her feet, wear protective shoe gear when ambulatory, moisturizer to maintain skin integrity and follow in this office in approximately 2-3 months, sooner p.r.n.    - After cleansing the  area w/ alcohol prep pad the above mentioned hyperkeratosis was trimmed utilizing No 15 scapel, to a smooth base with out incident. Patient tolerated this  well and reported comfort to the area of distal toes 2  and 4 b/l   .

## 2023-09-20 DIAGNOSIS — Z78.0 MENOPAUSE: ICD-10-CM

## 2023-09-20 LAB
BACTERIA BLD CULT: NORMAL
BACTERIA BLD CULT: NORMAL

## 2023-09-21 ENCOUNTER — OFFICE VISIT (OUTPATIENT)
Dept: INTERNAL MEDICINE | Facility: CLINIC | Age: 83
End: 2023-09-21
Payer: MEDICARE

## 2023-09-21 ENCOUNTER — HOSPITAL ENCOUNTER (EMERGENCY)
Facility: HOSPITAL | Age: 83
Discharge: HOME | End: 2023-09-21
Attending: EMERGENCY MEDICINE
Payer: MEDICARE

## 2023-09-21 VITALS
DIASTOLIC BLOOD PRESSURE: 80 MMHG | WEIGHT: 203.13 LBS | HEIGHT: 68 IN | BODY MASS INDEX: 30.79 KG/M2 | HEART RATE: 87 BPM | OXYGEN SATURATION: 99 % | SYSTOLIC BLOOD PRESSURE: 144 MMHG

## 2023-09-21 VITALS
WEIGHT: 203 LBS | HEIGHT: 68 IN | HEART RATE: 74 BPM | TEMPERATURE: 99 F | RESPIRATION RATE: 18 BRPM | BODY MASS INDEX: 30.77 KG/M2 | OXYGEN SATURATION: 99 % | DIASTOLIC BLOOD PRESSURE: 76 MMHG | SYSTOLIC BLOOD PRESSURE: 140 MMHG

## 2023-09-21 DIAGNOSIS — E16.2 HYPOGLYCEMIA: ICD-10-CM

## 2023-09-21 DIAGNOSIS — E16.2 HYPOGLYCEMIA: Primary | ICD-10-CM

## 2023-09-21 DIAGNOSIS — J18.9 COMMUNITY ACQUIRED PNEUMONIA, UNSPECIFIED LATERALITY: Primary | ICD-10-CM

## 2023-09-21 DIAGNOSIS — N30.00 ACUTE CYSTITIS WITHOUT HEMATURIA: ICD-10-CM

## 2023-09-21 LAB
ALBUMIN SERPL BCP-MCNC: 3 G/DL (ref 3.5–5.2)
ALP SERPL-CCNC: 89 U/L (ref 55–135)
ALT SERPL W/O P-5'-P-CCNC: 23 U/L (ref 10–44)
ANION GAP SERPL CALC-SCNC: 11 MMOL/L (ref 8–16)
AST SERPL-CCNC: 33 U/L (ref 10–40)
BACTERIA #/AREA URNS AUTO: ABNORMAL /HPF
BASOPHILS # BLD AUTO: 0.09 K/UL (ref 0–0.2)
BASOPHILS NFR BLD: 0.6 % (ref 0–1.9)
BILIRUB SERPL-MCNC: 0.2 MG/DL (ref 0.1–1)
BILIRUB UR QL STRIP: NEGATIVE
BUN SERPL-MCNC: 11 MG/DL (ref 8–23)
CALCIUM SERPL-MCNC: 9.6 MG/DL (ref 8.7–10.5)
CAOX CRY UR QL COMP ASSIST: ABNORMAL
CHLORIDE SERPL-SCNC: 102 MMOL/L (ref 95–110)
CLARITY UR REFRACT.AUTO: ABNORMAL
CO2 SERPL-SCNC: 28 MMOL/L (ref 23–29)
COLOR UR AUTO: YELLOW
CREAT SERPL-MCNC: 1.2 MG/DL (ref 0.5–1.4)
DIFFERENTIAL METHOD: ABNORMAL
EOSINOPHIL # BLD AUTO: 0.4 K/UL (ref 0–0.5)
EOSINOPHIL NFR BLD: 2.5 % (ref 0–8)
ERYTHROCYTE [DISTWIDTH] IN BLOOD BY AUTOMATED COUNT: 15.4 % (ref 11.5–14.5)
EST. GFR  (NO RACE VARIABLE): 44.9 ML/MIN/1.73 M^2
GLUCOSE SERPL-MCNC: 115 MG/DL (ref 70–110)
GLUCOSE UR QL STRIP: NEGATIVE
HCT VFR BLD AUTO: 33.5 % (ref 37–48.5)
HGB BLD-MCNC: 11 G/DL (ref 12–16)
HGB UR QL STRIP: NEGATIVE
HYALINE CASTS UR QL AUTO: 111 /LPF
IMM GRANULOCYTES # BLD AUTO: 0.29 K/UL (ref 0–0.04)
IMM GRANULOCYTES NFR BLD AUTO: 1.9 % (ref 0–0.5)
KETONES UR QL STRIP: NEGATIVE
LEUKOCYTE ESTERASE UR QL STRIP: ABNORMAL
LYMPHOCYTES # BLD AUTO: 3.1 K/UL (ref 1–4.8)
LYMPHOCYTES NFR BLD: 20.5 % (ref 18–48)
MCH RBC QN AUTO: 26.3 PG (ref 27–31)
MCHC RBC AUTO-ENTMCNC: 32.8 G/DL (ref 32–36)
MCV RBC AUTO: 80 FL (ref 82–98)
MICROSCOPIC COMMENT: ABNORMAL
MONOCYTES # BLD AUTO: 1.3 K/UL (ref 0.3–1)
MONOCYTES NFR BLD: 8.8 % (ref 4–15)
NEUTROPHILS # BLD AUTO: 10 K/UL (ref 1.8–7.7)
NEUTROPHILS NFR BLD: 65.7 % (ref 38–73)
NITRITE UR QL STRIP: NEGATIVE
NRBC BLD-RTO: 0 /100 WBC
PH UR STRIP: 6 [PH] (ref 5–8)
PLATELET # BLD AUTO: 402 K/UL (ref 150–450)
PMV BLD AUTO: 10.3 FL (ref 9.2–12.9)
POCT GLUCOSE: 124 MG/DL (ref 70–110)
POCT GLUCOSE: 83 MG/DL (ref 70–110)
POTASSIUM SERPL-SCNC: 4.2 MMOL/L (ref 3.5–5.1)
PROT SERPL-MCNC: 8.4 G/DL (ref 6–8.4)
PROT UR QL STRIP: ABNORMAL
RBC # BLD AUTO: 4.19 M/UL (ref 4–5.4)
RBC #/AREA URNS AUTO: 4 /HPF (ref 0–4)
SODIUM SERPL-SCNC: 141 MMOL/L (ref 136–145)
SP GR UR STRIP: 1.02 (ref 1–1.03)
SQUAMOUS #/AREA URNS AUTO: 6 /HPF
URN SPEC COLLECT METH UR: ABNORMAL
WBC # BLD AUTO: 15.17 K/UL (ref 3.9–12.7)
WBC #/AREA URNS AUTO: 3 /HPF (ref 0–5)

## 2023-09-21 PROCEDURE — 3079F PR MOST RECENT DIASTOLIC BLOOD PRESSURE 80-89 MM HG: ICD-10-PCS | Mod: CPTII,S$GLB,, | Performed by: PHYSICIAN ASSISTANT

## 2023-09-21 PROCEDURE — 1125F AMNT PAIN NOTED PAIN PRSNT: CPT | Mod: CPTII,S$GLB,, | Performed by: PHYSICIAN ASSISTANT

## 2023-09-21 PROCEDURE — 82962 GLUCOSE BLOOD TEST: CPT

## 2023-09-21 PROCEDURE — 99283 EMERGENCY DEPT VISIT LOW MDM: CPT

## 2023-09-21 PROCEDURE — 80053 COMPREHEN METABOLIC PANEL: CPT | Performed by: PHYSICIAN ASSISTANT

## 2023-09-21 PROCEDURE — 3288F FALL RISK ASSESSMENT DOCD: CPT | Mod: CPTII,S$GLB,, | Performed by: PHYSICIAN ASSISTANT

## 2023-09-21 PROCEDURE — 3288F PR FALLS RISK ASSESSMENT DOCUMENTED: ICD-10-PCS | Mod: CPTII,S$GLB,, | Performed by: PHYSICIAN ASSISTANT

## 2023-09-21 PROCEDURE — 99214 PR OFFICE/OUTPT VISIT, EST, LEVL IV, 30-39 MIN: ICD-10-PCS | Mod: S$GLB,,, | Performed by: PHYSICIAN ASSISTANT

## 2023-09-21 PROCEDURE — 99999 PR PBB SHADOW E&M-EST. PATIENT-LVL V: CPT | Mod: PBBFAC,,, | Performed by: PHYSICIAN ASSISTANT

## 2023-09-21 PROCEDURE — 99214 OFFICE O/P EST MOD 30 MIN: CPT | Mod: S$GLB,,, | Performed by: PHYSICIAN ASSISTANT

## 2023-09-21 PROCEDURE — 81001 URINALYSIS AUTO W/SCOPE: CPT | Performed by: PHYSICIAN ASSISTANT

## 2023-09-21 PROCEDURE — 3077F SYST BP >= 140 MM HG: CPT | Mod: CPTII,S$GLB,, | Performed by: PHYSICIAN ASSISTANT

## 2023-09-21 PROCEDURE — 3079F DIAST BP 80-89 MM HG: CPT | Mod: CPTII,S$GLB,, | Performed by: PHYSICIAN ASSISTANT

## 2023-09-21 PROCEDURE — 3077F PR MOST RECENT SYSTOLIC BLOOD PRESSURE >= 140 MM HG: ICD-10-PCS | Mod: CPTII,S$GLB,, | Performed by: PHYSICIAN ASSISTANT

## 2023-09-21 PROCEDURE — 1125F PR PAIN SEVERITY QUANTIFIED, PAIN PRESENT: ICD-10-PCS | Mod: CPTII,S$GLB,, | Performed by: PHYSICIAN ASSISTANT

## 2023-09-21 PROCEDURE — 85025 COMPLETE CBC W/AUTO DIFF WBC: CPT | Performed by: PHYSICIAN ASSISTANT

## 2023-09-21 PROCEDURE — 1101F PT FALLS ASSESS-DOCD LE1/YR: CPT | Mod: CPTII,S$GLB,, | Performed by: PHYSICIAN ASSISTANT

## 2023-09-21 PROCEDURE — 1101F PR PT FALLS ASSESS DOC 0-1 FALLS W/OUT INJ PAST YR: ICD-10-PCS | Mod: CPTII,S$GLB,, | Performed by: PHYSICIAN ASSISTANT

## 2023-09-21 PROCEDURE — 99999 PR PBB SHADOW E&M-EST. PATIENT-LVL V: ICD-10-PCS | Mod: PBBFAC,,, | Performed by: PHYSICIAN ASSISTANT

## 2023-09-21 RX ORDER — AMOXICILLIN AND CLAVULANATE POTASSIUM 875; 125 MG/1; MG/1
1 TABLET, FILM COATED ORAL EVERY 12 HOURS
Qty: 20 TABLET | Refills: 0 | Status: SHIPPED | OUTPATIENT
Start: 2023-09-21 | End: 2023-10-04

## 2023-09-21 RX ORDER — CEPHALEXIN 500 MG/1
500 CAPSULE ORAL
Status: DISCONTINUED | OUTPATIENT
Start: 2023-09-21 | End: 2023-09-21 | Stop reason: HOSPADM

## 2023-09-21 RX ORDER — CEPHALEXIN 500 MG/1
500 CAPSULE ORAL 4 TIMES DAILY
Qty: 12 CAPSULE | Refills: 0 | Status: SHIPPED | OUTPATIENT
Start: 2023-09-21 | End: 2023-09-25

## 2023-09-21 RX ORDER — CEPHALEXIN 500 MG/1
500 CAPSULE ORAL
Status: DISCONTINUED | OUTPATIENT
Start: 2023-09-21 | End: 2023-09-21

## 2023-09-21 NOTE — ED TRIAGE NOTES
"83 y.o. female arrived to the ED via POV for c/o hypoglycemia. Pt seen by PCP for wellness visit and found to be diaphoretic at time of presentation. Pt with reported low BG readings and administered "OJ and candy" while in clinic.  upon ED presentation. Denies c/o chest pain, SOB, polyuria, polydipsia or polyphagia.  "

## 2023-09-21 NOTE — ED PROVIDER NOTES
Encounter Date: 9/21/2023       History     Chief Complaint   Patient presents with    Hypoglycemia     Pt sent from clinic for hypoglycemia-was given juice x 4 and candy.  Recheck in clinic was 70's     Patient is an 83-year-old female with past medical history of insulin-dependent diabetes, hypertension, hyperlipidemia, migraines, CAD and CHF presents to emergency department from primary care clinic for persistent low blood sugar despite oral intake at the clinic.  Patient presents to the clinic feeling like her normal self.  Irrigation gave her insulin in the morning prior to eating breakfast a breakfast and then went to her clinic around middle of the day.  Patient had a blood sugar in the 70s at that time, but was asymptomatic.  They gave her candy and juice and her sugar decreased to the 60s and so they told her to come to the ER for evaluation.  Patient presents without any symptoms just mild dysuria for the past couple days.  Per chart review patient has had an episode of hypoglycemia requiring admission/observation in the past she was taken off oral anti glycemic medications.    Additionally patient did have nephrolithiasis that was treated with lithotripsy back in July.  Per chart review    The history is provided by the patient and medical records.     Review of patient's allergies indicates:   Allergen Reactions    Crestor [rosuvastatin]      Cramping in legs    Ezetimibe Diarrhea     Other reaction(s): abdominal pain, Diarrhea      Hydrocodone Itching    Lisinopril      Other reaction(s): cough      Sulfa (sulfonamide antibiotics) Itching and Rash           Sulfamethoxazole-trimethoprim     Valsartan Swelling     Past Medical History:   Diagnosis Date    Abnormality of lung 11/08/2011    Stable bandlike opacities at the lung bases, most likely representing      Anxiety     Arthritis     CAD (coronary artery disease)     Calculus of ureter 2/22/2011    CHF (congestive heart failure)     Chronic back pain  7/29/2012    Colon polyp 9/2010; 11/2010    Colon polyps 7/29/2012    Coronary artery disease involving native coronary artery of native heart with angina pectoris     Depression     Diabetes mellitus     Diabetes mellitus type II     Diverticulitis large intestine 7/27/2015    Diverticulitis of large intestine without perforation or abscess without bleeding     Diverticulosis 09/25/2010; 11/02/2010; 11/08/2011; 7/29/2012    Duodenal disorder 08/25/2011    Duodenal erosion noted on EGD.    Duodenal ulcer, unspecified as acute or chronic, without hemorrhage, perforation, or obstruction 8/24/2011    E. coli sepsis 12/2010    Due to left ureteral stone with left nephrostomy tube - hospitalized in [place]    Esophageal dysmotility 01/24/2012    Noted on upper GI-barium swallow.    Facial weakness 1969    Left facial weakness s/p left mastoidectomy in ~ 1969.    Fatty liver 11/08/2011    Reported on CT-abdomen and in 06/2012 Gastro clinic visit note.    Gastric polyp 09/29/2010    GERD (gastroesophageal reflux disease) 7/29/2012    Hepatomegaly 11/08/2011    Reported on CT-abdomen    Herpes zoster with other nervous system complications(053.19) 2/28/2011    Hiatal hernia 06/26/2006; 09/29/2010; 08/25/2011    Noted on barium swallow 2006; noted on  EGD 2011.    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    Trouble in sleeping     Type II or unspecified type diabetes mellitus with neurological manifestations, not stated as uncontrolled(250.60)     Type II or unspecified type diabetes mellitus with peripheral circulatory disorders, not stated as uncontrolled(250.70)     Type II or unspecified type diabetes mellitus with renal manifestations, not stated as uncontrolled(250.40)      Past Surgical History:   Procedure Laterality Date    BELPHAROPTOSIS REPAIR      s/p LAMBERTO levator repair - Dr. Dejesus    CARDIAC CATHETERIZATION      CARPAL TUNNEL RELEASE  3/13/2012    CATARACT EXTRACTION W/  INTRAOCULAR LENS IMPLANT Right 10/31/2018        CATARACT EXTRACTION W/  INTRAOCULAR LENS IMPLANT Left 03/22/2018        CHOLECYSTECTOMY      COLONOSCOPY N/A 11/16/2016    Procedure: COLONOSCOPY;  Surgeon: Chris Storey MD;  Location: [facility] (4TH FLR);  Service: Endoscopy;  Laterality: N/A;    COLONOSCOPY N/A 6/14/2017    Procedure: COLONOSCOPY;  Surgeon: Chris Storey MD;  Location: [facility] (4TH FLR);  Service: Endoscopy;  Laterality: N/A;  colonoscopy in 3 months with better bowel prep. - Split PEG prep ordered    CYSTOSCOPY  4/19/2023    Procedure: CYSTOSCOPY;  Surgeon: Domo Bates MD;  [facility];  Service: Urology;;    CYSTOSCOPY N/A 5/12/2023    Procedure: CYSTOSCOPY;  Surgeon: Laz Rudolph Jr., MD;  [facility];  Service: Urology;  Laterality: N/A;    CYSTOSCOPY N/A 7/7/2023    Procedure: CYSTOSCOPY;  Surgeon: Laz Rudolph Jr., MD;  [facility];  Service: Urology;  Laterality: N/A;    ESOPHAGOGASTRODUODENOSCOPY N/A 11/10/2021    Procedure: EGD (ESOPHAGOGASTRODUODENOSCOPY);  Surgeon: Kuldeep Velázquez MD;  Location: [facility] ENDO (4TH FLR);  Service: Endoscopy;  Laterality: N/A;  11/4-eliquis hold ok see te -tb-fully vacc-inst mail and  verbal-    EXTRACORPOREAL SHOCK WAVE LITHOTRIPSY      EXTRACTION - STONE Right 5/12/2023    Procedure: EXTRACTION - STONE;  Surgeon: Laz Rudolph Jr., MD;  [facility];  Service: Urology;  Laterality: Right;    INJECTION OF ANESTHETIC AGENT AROUND NERVE Bilateral 6/24/2020    Procedure: BLOCK, NERVE, C4-C5-C6 MEDIAL BRANCH ok per [place] to add on;  Surgeon: Oscar Ravi MD;  Location: [facility] PAIN MGT;  Service: Pain Management;  Laterality: Bilateral;    INTRAOCULAR PROSTHESES INSERTION Right 10/31/2018    Procedure: INSERTION-INTRAOCULAR LENS (IOL);  Surgeon: Keysha Valle MD;  Location: [facility] OR;  Service: Ophthalmology;  Laterality: Right;    JOINT REPLACEMENT      LASER LITHOTRIPSY Right 5/12/2023    Procedure: LITHOTRIPSY, USING LASER;  Surgeon: Laz Rudolph Jr., MD;  Location: [facility] OR [address] [place] [address];  Service: Urology;  Laterality: Right;    LASER LITHOTRIPSY Right 7/7/2023    Procedure: LITHOTRIPSY, USING LASER;  Surgeon: Laz Rudolph Jr., MD;  Location: [facility] OR [address] [place] [address];  Service: Urology;  Laterality: Right;    mastoid tumor removal  1969    Left mastoidectomy with residual left facial weakness.    NEPHROSTOMY      OOPHORECTOMY      PHACOEMULSIFICATION OF CATARACT Right 10/31/2018    Procedure: PHACOEMULSIFICATION-ASPIRATION-CATARACT;  Surgeon: Keysha Valle MD;  Location: [facility] OR;  Service: Ophthalmology;  Laterality: Right;    PYELOSCOPY Right 5/12/2023    Procedure: PYELOSCOPY;  Surgeon: Laz Rudolph Jr., MD;  Location: [facility] OR [address] [place] [address];  Service: Urology;  Laterality: Right;    PYELOSCOPY Right 7/7/2023    Procedure: PYELOSCOPY;  Surgeon: Laz Rudolph Jr., MD;  Location: [facility] OR [address] [place] [address];  Service: Urology;  Laterality: Right;    REMOVAL, STENT, URETER Right 5/12/2023    Procedure: REMOVAL, STENT, URETER;  Surgeon: Laz Rudolph Jr., MD;  [facility];  Service: Urology;  Laterality: Right;    REMOVAL, STENT, URETER Right 7/7/2023    Procedure: REMOVAL, STENT,  URETER;  Surgeon: Laz Rudolph Jr., MD;  Location: [facility] OR 1ST FLR;  Service: Urology;  Laterality: Right;    RETROGRADE PYELOGRAPHY Right 4/19/2023    Procedure: PYELOGRAM, RETROGRADE;  Surgeon: Domo Bates MD;  Location: [facility] OR 1ST FLR;  Service: Urology;  Laterality: Right;    TOTAL ABDOMINAL HYSTERECTOMY  1969    TAHBSO    TOTAL KNEE ARTHROPLASTY  9/13/2012    Left    TRANSFORAMINAL EPIDURAL INJECTION OF STEROID Bilateral 9/17/2019    Procedure: INJECTION, STEROID, EPIDURAL, TRANSFORAMINAL APPROACH;  Surgeon: Oscar Ravi MD;  Location: [facility] PAIN MGT;  Service: Pain Management;  Laterality: Bilateral;  B/L TFESI L4/L5    TRANSFORAMINAL EPIDURAL INJECTION OF STEROID Bilateral 10/2/2020    Procedure: INJECTION, STEROID, EPIDURAL, TRANSFORAMINAL APPROACH;  Surgeon: Oscar Ravi MD;  Location: [facility] PAIN MGT;  Service: Pain Management;  Laterality: Bilateral;  B/L TFESI L4/5    TRANSFORAMINAL EPIDURAL INJECTION OF STEROID Bilateral 5/7/2021    Procedure: INJECTION, STEROID, EPIDURAL, TRANSFORAMINAL APPROACH, L4-L5 clear to hold Eliquis 3 days;  Surgeon: Oscar Ravi MD;  Location: [facility] PAIN MGT;  Service: Pain Management;  Laterality: Bilateral;    URETERAL STENT PLACEMENT Right 4/19/2023    Procedure: INSERTION, STENT, URETER;  Surgeon: Domo Bates MD;  Location: [facility] OR 1ST FLR;  Service: Urology;  Laterality: Right;    URETERAL STENT PLACEMENT N/A 5/12/2023    Procedure: INSERTION, STENT, URETER;  Surgeon: Laz Rudolph Jr., MD;  Location: [facility] OR 1ST FLR;  Service: Urology;  Laterality: N/A;    URETERAL STENT PLACEMENT Right 7/7/2023    Procedure: INSERTION, STENT, URETER;  Surgeon: Laz Rudolph Jr., MD;  Location: [facility] OR 1ST FLR;  Service: Urology;  Laterality: Right;    URETEROSCOPY Right 5/12/2023    Procedure: URETEROSCOPY;  Surgeon: Laz Rudolph Jr., MD;  Location: [facility] OR 1ST FLR;  Service: Urology;  Laterality: Right;    URETEROSCOPY Right 7/7/2023    Procedure:  URETEROSCOPY;  Surgeon: Laz Rudolph Jr., MD;  Location: [facility] OR [address] [place] [address];  Service: Urology;  Laterality: Right;     Family History   Problem Relation Age of Onset    Sleep apnea Sister     Diabetes Sister     Cancer Mother         brain tumor    Hypertension Mother     Heart disease Father     Heart attack Father     Dementia Brother     Lupus Brother     Frontotemporal dementia Brother     No Known Problems Daughter     No Known Problems Son     Diabetes Sister     Lupus Sister     Breast cancer Sister     Diabetes Sister     Cancer Sister         breast cancer    Heart attack Sister     Diabetes Sister     Liver cancer Brother     Drug abuse Brother     Alcohol abuse Brother     Cirrhosis Brother     Drug abuse Brother     No Known Problems Daughter     No Known Problems Son     No Known Problems Son     No Known Problems Maternal Grandmother     No Known Problems Maternal Grandfather     No Known Problems Paternal Grandmother     No Known Problems Paternal Grandfather     Diabetes Brother     Dementia Brother     Diabetes Other     Ovarian cancer Other         Niece     Breast cancer Maternal Aunt     No Known Problems Maternal Uncle     No Known Problems Paternal Aunt     No Known Problems Paternal Uncle     Glaucoma Neg Hx     Blindness Neg Hx     Celiac disease Neg Hx     Colon cancer Neg Hx     Colon polyps Neg Hx     Esophageal cancer Neg Hx     Inflammatory bowel disease Neg Hx     Liver disease Neg Hx     Rectal cancer Neg Hx     Stomach cancer Neg Hx     Ulcerative colitis Neg Hx     Melanoma Neg Hx     Multiple sclerosis Neg Hx     Psoriasis Neg Hx     Amblyopia Neg Hx     Cataracts Neg Hx     Macular degeneration Neg Hx     Retinal detachment Neg Hx     Strabismus Neg Hx     Stroke Neg Hx     Thyroid disease Neg Hx      Social History     Tobacco Use    Smoking status: Former     Current packs/day: 0.00     Average packs/day: 1 pack/day for 15.0 years (15.0 ttl pk-yrs)     Types: Cigarettes      Start date: 1985     Quit date: 2000     Years since quittin.1    Smokeless tobacco: Never   Substance Use Topics    Alcohol use: No    Drug use: No     Review of Systems  ROS negative except as noted in HPI    Physical Exam     Initial Vitals [23 1605]   BP Pulse Resp Temp SpO2   (!) 145/80 70 18 98.8 °F (37.1 °C) 98 %      MAP       --         Physical Exam    Nursing note and vitals reviewed.  Constitutional: She appears well-developed and well-nourished.   HENT:   Head: Normocephalic and atraumatic.   Right Ear: External ear normal.   Left Ear: External ear normal.   Nose: Nose normal.   Mouth/Throat: Oropharynx is clear and moist.   Diminished hearing   Eyes: EOM are normal. Pupils are equal, round, and reactive to light.   Neck: Neck supple.   Normal range of motion.  Cardiovascular:  Normal rate, regular rhythm, normal heart sounds and intact distal pulses.           Pulmonary/Chest: Breath sounds normal.   Abdominal: Abdomen is soft. She exhibits no distension. There is no abdominal tenderness.   Musculoskeletal:         General: Normal range of motion.      Cervical back: Normal range of motion and neck supple.     Neurological: She is alert and oriented to person, place, and time. She has normal strength.   Skin: Skin is warm and dry. Capillary refill takes less than 2 seconds.   Psychiatric: She has a normal mood and affect.           ED Course   Procedures  Labs Reviewed   CBC W/ AUTO DIFFERENTIAL - Abnormal; Notable for the following components:       Result Value    WBC 15.17 (*)     Hemoglobin 11.0 (*)     Hematocrit 33.5 (*)     MCV 80 (*)     MCH 26.3 (*)     RDW 15.4 (*)     Immature Granulocytes 1.9 (*)     Gran # (ANC) 10.0 (*)     Immature Grans (Abs) 0.29 (*)     Mono # 1.3 (*)     All other components within normal limits   COMPREHENSIVE METABOLIC PANEL - Abnormal; Notable for the following components:    Glucose 115 (*)     Albumin 3.0 (*)     eGFR 44.9 (*)     All other  components within normal limits   URINALYSIS, REFLEX TO URINE CULTURE - Abnormal; Notable for the following components:    Appearance, UA Hazy (*)     Protein, UA Trace (*)     Leukocytes, UA 1+ (*)     All other components within normal limits    Narrative:     Specimen Source->Urine   URINALYSIS MICROSCOPIC - Abnormal; Notable for the following components:    Hyaline Casts,  (*)     Ca Oxalate Karla, UA Many (*)     All other components within normal limits    Narrative:     Specimen Source->Urine   POCT GLUCOSE - Abnormal; Notable for the following components:    POCT Glucose 124 (*)     All other components within normal limits   POCT GLUCOSE   POCT GLUCOSE MONITORING CONTINUOUS          Imaging Results    None          Medications   cephALEXin capsule 500 mg (500 mg Oral Not Given 9/21/23 2100)     Medical Decision Making  Patient is an 83-year-old female with past medical history of insulin-dependent diabetes, hypertension, hyperlipidemia, migraines, CAD and CHF presents to emergency department from primary care clinic for persistent low blood sugar despite oral intake at the clinic.    On initial evaluation patient had normal vitals, was alert and oriented and able to participate with history taking and physical exam.  Patient states that she took her insulin like she normally does this morning and went to her clinic today.  Patient denies any symptoms.  Only states that they told her to come here because they were concerned about her sugar.  Patient has good recollection of her insulin regimen.  It does appear that she does forget to eat sometimes even after giving herself insulin.  Patient is not on any orals sulfonylureas or glipizide, patient does have a once a week injection with a GLP.    Given urinary symptoms we will test patient for UA and get basic labs confirm glucose is stable.    Throughout the ED course patient had normal glucose without any symptoms.  Patient did have a borderline UA  concerning for UTI.  Given the presence of stones and the significant hyaline casts we will treat for simple cystitis and send a 3 day course of antibiotics to her house.    Patient instructed to reduce her insulin regimen.  Instructed to reduced to 30 units long-acting, and 10 units with her meals.  Patient was able to repeat this plan with good understanding and will continue to do this plan until she sees her PCP.    Stable for discharge at this time.    Amount and/or Complexity of Data Reviewed  External Data Reviewed: labs, radiology, ECG and notes.  Labs: ordered. Decision-making details documented in ED Course.    Risk  Prescription drug management.               ED Course as of 09/21/23 2312   Thu Sep 21, 2023   1941 CBC auto differential(!)  Noted leukocytosis, patient does have previously noted leukocytosis. [TK]   1941 Comprehensive metabolic panel(!)  CMP unremarkable, it is at her current baseline, glucose also 115 without any intervention [TK]      ED Course User Index  [TK] Annetta Goodwin DO                    Clinical Impression:   Final diagnoses:  [E16.2] Hypoglycemia (Primary)  [N30.00] Acute cystitis without hematuria        ED Disposition Condition    Discharge Stable          ED Prescriptions       Medication Sig Dispense Start Date End Date Auth. Provider    cephALEXin (KEFLEX) 500 MG capsule Take 1 capsule (500 mg total) by mouth 4 (four) times daily. for 3 days 12 capsule 9/21/2023 9/24/2023 Annetta Goodwin DO          Follow-up Information       Follow up With Specialties Details Why Contact Info    Jeannine Arriaga MD Internal Medicine In 1 week  [address]  [place] [address]  [phone]               Annetta Goodwin DO  Resident  09/21/23 2313

## 2023-09-21 NOTE — ED NOTES
Pt aware of urine sample needing to be collected and verbalized understanding. Denies need to void at this time. Care ongoing.    Car

## 2023-09-21 NOTE — PROGRESS NOTES
"Subjective:       Patient ID: Henrietta Villasenor is a 83 y.o. female.        Chief Complaint: Follow-up    Henrietta Villasenor is an established patient of Jeannine Arriaga MD here today for hospital f/u visit.    She was admitted for CAP.  D/c with zithromax and augmentin.  She could not get augmentin from the pharmacy as not covered in XR formulation so she did not take.    Comes into clinic diaphoretic so checked blood sugar and 76.  Recall history of severe hypoglycemia in clinic where blood sugar was down to 39.  She took insulin 18 U prior to arrival.      States she has been feeling terrible.  Cough, no sleep, diarrhea, "I feel awful."    She has eaten very little today, grits, toast, coffee.             Review of Systems   Constitutional:  Positive for diaphoresis. Negative for chills, fatigue and fever.   HENT:  Positive for congestion. Negative for sore throat.    Eyes:  Negative for visual disturbance.   Respiratory:  Positive for cough and shortness of breath. Negative for chest tightness.    Cardiovascular:  Negative for chest pain, palpitations and leg swelling.   Gastrointestinal:  Negative for abdominal pain, blood in stool, constipation, diarrhea, nausea and vomiting.   Genitourinary:  Negative for dysuria, frequency, hematuria and urgency.   Musculoskeletal:  Negative for arthralgias and back pain.   Skin:  Negative for rash.   Neurological:  Negative for dizziness, syncope, weakness and headaches.   Psychiatric/Behavioral:  Negative for dysphoric mood and sleep disturbance. The patient is not nervous/anxious.        Objective:      Physical Exam  Vitals and nursing note reviewed.   Constitutional:       Appearance: Normal appearance. She is well-developed. She is diaphoretic.   HENT:      Head: Normocephalic.      Right Ear: External ear normal.      Left Ear: External ear normal.   Eyes:      Pupils: Pupils are equal, round, and reactive to light.   Cardiovascular:      Rate and Rhythm: Normal rate and " "regular rhythm.      Heart sounds: Normal heart sounds. No murmur heard.     No friction rub. No gallop.   Pulmonary:      Effort: Pulmonary effort is normal. Tachypnea present. No respiratory distress.      Breath sounds: Normal breath sounds.   Abdominal:      Palpations: Abdomen is soft.      Tenderness: There is no abdominal tenderness.   Skin:     General: Skin is warm.   Neurological:      Mental Status: She is alert.         Assessment:       1. Community acquired pneumonia, unspecified laterality    2. Hypoglycemia        Plan:       Henrietta was seen today for follow-up.    Diagnoses and all orders for this visit:    Community acquired pneumonia, unspecified laterality  -     amoxicillin-clavulanate 875-125mg (AUGMENTIN) 875-125 mg per tablet; Take 1 tablet by mouth every 12 (twelve) hours.  -     Refer to Emergency Dept.    Hypoglycemia  -     POCT Glucose, Hand-Held Device  -     POCT Glucose, Hand-Held Device  -     Refer to Emergency Dept.    "My mind hasn't been right these last 2 weeks"  "I feel awful"  Blood sugar 76 upon arrival, gave apple juice x 2 and blood sugar dropped to 68 15 minutes later, then given orange juice and 2 hard candies and blood sugar dropped to 62.  H/o hypoglycemia in past required hospital admission.  Has not been able to get augmentin due to insurance coverage for XR formulation.  To ER now  She has poor social support at home    Pt has been given instructions populated from patient instructions database and has verbalized understanding of the after visit summary and information contained wherein.    Follow up with a primary care provider. May go to ER for acute shortness of breath, lightheadedness, fever, or any other emergent complaints or changes in condition.    "This note will be shared with the patient"    Future Appointments   Date Time Provider Department Center   10/4/2023 12:30 PM Jeannine Arriaga MD [facility] [place]                 "

## 2023-09-21 NOTE — FIRST PROVIDER EVALUATION
Emergency Department TeleTriage Encounter Note      CHIEF COMPLAINT    Chief Complaint   Patient presents with    Hypoglycemia     Pt sent from clinic for hypoglycemia-was given juice x 4 and candy.  Recheck in clinic was 70's       VITAL SIGNS   Initial Vitals [09/21/23 1605]   BP Pulse Resp Temp SpO2   (!) 145/80 70 18 98.8 °F (37.1 °C) 98 %      MAP       --            ALLERGIES    Review of patient's allergies indicates:   Allergen Reactions    Crestor [rosuvastatin]      Cramping in legs    Ezetimibe Diarrhea     Other reaction(s): abdominal pain, Diarrhea      Hydrocodone Itching    Lisinopril      Other reaction(s): cough      Sulfa (sulfonamide antibiotics) Itching and Rash           Sulfamethoxazole-trimethoprim     Valsartan Swelling       PROVIDER TRIAGE NOTE  This is a teletriage evaluation of a 83 y.o. female presenting to the ED complaining of hypoglycemia. Patient reports low blood sugar in clinic. She ate candy and drank juice. She said she just feels tired.     Patient is alert and oriented. She speaks in complete sentences. She is sitting upright in the chair in no distress.     Initial orders will be placed and care will be transferred to an alternate provider when patient is roomed for a full evaluation. Any additional orders and the final disposition will be determined by that provider.         ORDERS  Labs Reviewed   CBC W/ AUTO DIFFERENTIAL   COMPREHENSIVE METABOLIC PANEL   URINALYSIS, REFLEX TO URINE CULTURE   POCT GLUCOSE MONITORING CONTINUOUS       ED Orders (720h ago, onward)      Start Ordered     Status Ordering Provider    09/21/23 1707 09/21/23 1706  CBC auto differential  STAT         Ordered KING CHARLTON    09/21/23 1707 09/21/23 1706  Comprehensive metabolic panel  STAT         Ordered KING CHARLTON    09/21/23 1707 09/21/23 1706  Insert Saline lock IV  Once         Ordered KING CHARLTON GLa Nena    09/21/23 1707 09/21/23 1706  Urinalysis, Reflex to Urine Culture Urine, Clean Catch   STAT         Ordered ZOLTAN KING SORTO.    09/21/23 1707 09/21/23 1706  POCT glucose  Once         Ordered KING CHARLTON.              Virtual Visit Note: The provider triage portion of this emergency department evaluation and documentation was performed via [employer], a HIPAA-compliant telemedicine application, in concert with a tele-presenter in the room. A face to face patient evaluation with one of my colleagues will occur once the patient is placed in an emergency department room.      DISCLAIMER: This note was prepared with [employer] voice recognition transcription software. Garbled syntax, mangled pronouns, and other bizarre constructions may be attributed to that software system.

## 2023-09-22 ENCOUNTER — PATIENT OUTREACH (OUTPATIENT)
Dept: EMERGENCY MEDICINE | Facility: HOSPITAL | Age: 83
End: 2023-09-22
Payer: MEDICARE

## 2023-09-22 NOTE — PROGRESS NOTES
Patient had PCP F/U yesterday before being sent to the ED for hypoglycemia. She has F/U with Dr. Jeannine Arriaga on 10/4/23 at 12:30. Will remind pt of appt.

## 2023-09-22 NOTE — DISCHARGE INSTRUCTIONS
Thank you for coming to the emergency department today.  We evaluated due for hypoglycemia.  Without any intervention your blood sugar remained normal throughout her stay.  We are okay if you go home with strict instructions to reduce her insulin.    Please take only 30 units in the evening of her long-acting insulin  Please only take 10 units with meals until you see your primary care doctor.    We will also give you a course of antibiotics for a urinary tract infection.  Please stay well hydrated as there is evidence of small stones in your urine.

## 2023-10-01 ENCOUNTER — HOSPITAL ENCOUNTER (EMERGENCY)
Facility: HOSPITAL | Age: 83
Discharge: HOME | End: 2023-10-01
Attending: EMERGENCY MEDICINE
Payer: MEDICARE

## 2023-10-01 VITALS
RESPIRATION RATE: 16 BRPM | BODY MASS INDEX: 30.31 KG/M2 | WEIGHT: 200 LBS | HEIGHT: 68 IN | HEART RATE: 81 BPM | TEMPERATURE: 98 F | OXYGEN SATURATION: 98 % | SYSTOLIC BLOOD PRESSURE: 127 MMHG | DIASTOLIC BLOOD PRESSURE: 66 MMHG

## 2023-10-01 DIAGNOSIS — J34.89 SINUS PAIN: Primary | ICD-10-CM

## 2023-10-01 DIAGNOSIS — R09.81 NASAL CONGESTION: ICD-10-CM

## 2023-10-01 PROCEDURE — 99282 EMERGENCY DEPT VISIT SF MDM: CPT

## 2023-10-01 PROCEDURE — 25000003 PHARM REV CODE 250: Performed by: PHYSICIAN ASSISTANT

## 2023-10-01 RX ORDER — ACETAMINOPHEN 325 MG/1
650 TABLET ORAL
Status: COMPLETED | OUTPATIENT
Start: 2023-10-01 | End: 2023-10-01

## 2023-10-01 RX ADMIN — ACETAMINOPHEN 650 MG: 325 TABLET ORAL at 02:10

## 2023-10-01 NOTE — ED TRIAGE NOTES
Henrietta Villasenor, a 83 y.o. female presents to the ED w/ complaint of facial  swelling. Pt states onset of symptoms began on Friday. Pt states that the right side feels worse but it is painful to touch all over. Pt Pt denies any vision changes or respiratory issue. Pt denies falls or injuries to the area, headache or dizziness.     Triage note:  Chief Complaint   Patient presents with    Facial Pain     Feels swollen across cheeks      Review of patient's allergies indicates:   Allergen Reactions    Crestor [rosuvastatin]      Cramping in legs    Ezetimibe Diarrhea     Other reaction(s): abdominal pain, Diarrhea      Hydrocodone Itching    Lisinopril      Other reaction(s): cough      Sulfa (sulfonamide antibiotics) Itching and Rash           Sulfamethoxazole-trimethoprim     Valsartan Swelling     Past Medical History:   Diagnosis Date    Abnormality of lung 11/08/2011    Stable bandlike opacities at the lung bases, most likely representing      Anxiety     Arthritis     CAD (coronary artery disease)     Calculus of ureter 2/22/2011    CHF (congestive heart failure)     Chronic back pain 7/29/2012    Colon polyp 9/2010; 11/2010    Colon polyps 7/29/2012    Coronary artery disease involving native coronary artery of native heart with angina pectoris     Depression     Diabetes mellitus     Diabetes mellitus type II     Diverticulitis large intestine 7/27/2015    Diverticulitis of large intestine without perforation or abscess without bleeding     Diverticulosis 09/25/2010; 11/02/2010; 11/08/2011; 7/29/2012    Duodenal disorder 08/25/2011    Duodenal erosion noted on EGD.    Duodenal ulcer, unspecified as acute or chronic, without hemorrhage, perforation, or obstruction 8/24/2011    E. coli sepsis 12/2010    Due to left ureteral stone with left nephrostomy tube - hospitalized in [place]    Esophageal dysmotility 01/24/2012    Noted on upper GI-barium swallow.    Facial weakness 1969    Left facial weakness s/p left  mastoidectomy in ~ 1969.    Fatty liver 11/08/2011    Reported on CT-abdomen and in 06/2012 Gastro clinic visit note.    Gastric polyp 09/29/2010    GERD (gastroesophageal reflux disease) 7/29/2012    Hepatomegaly 11/08/2011    Reported on CT-abdomen    Herpes zoster with other nervous system complications(053.19) 2/28/2011    Hiatal hernia 06/26/2006; 09/29/2010; 08/25/2011    Noted on barium swallow 2006; noted on  EGD 2011.    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    Trouble in sleeping     Type II or unspecified type diabetes mellitus with neurological manifestations, not stated as uncontrolled(250.60)     Type II or unspecified type diabetes mellitus with peripheral circulatory disorders, not stated as uncontrolled(250.70)     Type II or unspecified type diabetes mellitus with renal manifestations, not stated as uncontrolled(250.40)

## 2023-10-01 NOTE — ED PROVIDER NOTES
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    Patient states that she noted her symptoms 2 days ago.  She states that she feels like her nose is swollen.  She states that it is tender to touch.  She states that when she feels air come out of her nose it "burns" so she opens her mouth to let out a breath.  States it "aches".  States initially she had rhinorrhea but now her nose is dry.  She has not had fever, chills, diaphoresis, dizziness, lightheadedness, headache, or trauma.        Review of patient's allergies indicates:   Allergen Reactions    Crestor [rosuvastatin]      Cramping in legs    Ezetimibe Diarrhea     Other reaction(s): abdominal pain, Diarrhea      Hydrocodone Itching    Lisinopril      Other reaction(s): cough      Sulfa (sulfonamide antibiotics) Itching and Rash           Sulfamethoxazole-trimethoprim     Valsartan Swelling     Past Medical History:   Diagnosis Date    Abnormality of lung 11/08/2011    Stable bandlike opacities at the lung bases, most likely representing      Anxiety     Arthritis     CAD (coronary artery disease)     Calculus of ureter 2/22/2011    CHF (congestive heart failure)     Chronic back pain 7/29/2012    Colon polyp 9/2010; 11/2010    Colon polyps 7/29/2012    Coronary artery disease involving native coronary artery of native heart with angina pectoris     Depression     Diabetes mellitus     Diabetes mellitus type II     Diverticulitis large intestine 7/27/2015    Diverticulitis of large intestine without perforation or abscess without bleeding     Diverticulosis 09/25/2010; 11/02/2010; 11/08/2011; 7/29/2012    Duodenal disorder 08/25/2011    " Duodenal erosion noted on EGD.    Duodenal ulcer, unspecified as acute or chronic, without hemorrhage, perforation, or obstruction 8/24/2011    E. coli sepsis 12/2010    Due to left ureteral stone with left nephrostomy tube - hospitalized in [place]    Esophageal dysmotility 01/24/2012    Noted on upper GI-barium swallow.    Facial weakness 1969    Left facial weakness s/p left mastoidectomy in ~ 1969.    Fatty liver 11/08/2011    Reported on CT-abdomen and in 06/2012 Gastro clinic visit note.    Gastric polyp 09/29/2010    GERD (gastroesophageal reflux disease) 7/29/2012    Hepatomegaly 11/08/2011    Reported on CT-abdomen    Herpes zoster with other nervous system complications(053.19) 2/28/2011    Hiatal hernia 06/26/2006; 09/29/2010; 08/25/2011    Noted on barium swallow 2006; noted on  EGD 2011.    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    Trouble in sleeping     Type II or unspecified type diabetes mellitus with neurological manifestations, not stated as uncontrolled(250.60)     Type II or unspecified type diabetes mellitus with peripheral circulatory disorders, not stated as uncontrolled(250.70)     Type II or unspecified type diabetes mellitus with renal manifestations, not stated  as uncontrolled(250.40)      Past Surgical History:   Procedure Laterality Date    BELPHAROPTOSIS REPAIR      s/p LAMBERTO levator repair - Dr. Dejesus    CARDIAC CATHETERIZATION      CARPAL TUNNEL RELEASE  3/13/2012    CATARACT EXTRACTION W/  INTRAOCULAR LENS IMPLANT Right 10/31/2018        CATARACT EXTRACTION W/  INTRAOCULAR LENS IMPLANT Left 03/22/2018        CHOLECYSTECTOMY      COLONOSCOPY N/A 11/16/2016    Procedure: COLONOSCOPY;  Surgeon: Chris Storey MD;  Location: [facility] ENDO (4TH FLR);  Service: Endoscopy;  Laterality: N/A;    COLONOSCOPY N/A 6/14/2017    Procedure: COLONOSCOPY;  Surgeon: Chris Storey MD;  [facility]  Service: Endoscopy;  Laterality: N/A;  colonoscopy in 3 months with better bowel prep. - Split PEG prep ordered    CYSTOSCOPY  4/19/2023    Procedure: CYSTOSCOPY;  Surgeon: Domo Bates MD;  Location: [facility] OR [address] [place] [address];  Service: Urology;;    CYSTOSCOPY N/A 5/12/2023    Procedure: CYSTOSCOPY;  Surgeon: Laz Rudolph Jr., MD;  Location: [facility] OR [address] [place] [address];  Service: Urology;  Laterality: N/A;    CYSTOSCOPY N/A 7/7/2023    Procedure: CYSTOSCOPY;  Surgeon: Laz Rudolph Jr., MD;  Location: [facility] OR [address] [place] [address];  Service: Urology;  Laterality: N/A;    ESOPHAGOGASTRODUODENOSCOPY N/A 11/10/2021    Procedure: EGD (ESOPHAGOGASTRODUODENOSCOPY);  Surgeon: Kuldeep Velázquez MD;  Location: [facility] ([address] [place] [address]);  Service: Endoscopy;  Laterality: N/A;  11/4-eliquis hold ok see te -tb-fully vacc-inst mail and verbal-    EXTRACORPOREAL SHOCK WAVE LITHOTRIPSY      EXTRACTION - STONE Right 5/12/2023    Procedure: EXTRACTION - STONE;  Surgeon: Laz Rudolph Jr., MD;  Location: [facility] OR [address] [place] [address];  Service: Urology;  Laterality: Right;    INJECTION OF ANESTHETIC AGENT AROUND NERVE Bilateral 6/24/2020    Procedure: BLOCK, NERVE, C4-C5-C6 MEDIAL BRANCH ok per Warnock to add on;  Surgeon: Oscar Ravi MD;  Location: [facility]IN [facility]GT;  Service: Pain Management;  Laterality:  Bilateral;    INTRAOCULAR PROSTHESES INSERTION Right 10/31/2018    Procedure: INSERTION-INTRAOCULAR LENS (IOL);  Surgeon: Keysha Valle MD;  Location: [facility] OR;  Service: Ophthalmology;  Laterality: Right;    JOINT REPLACEMENT      LASER LITHOTRIPSY Right 5/12/2023    Procedure: LITHOTRIPSY, USING LASER;  Surgeon: Laz Rudolph Jr., MD;  Location: [facility] OR 1ST FLR;  Service: Urology;  Laterality: Right;    LASER LITHOTRIPSY Right 7/7/2023    Procedure: LITHOTRIPSY, USING LASER;  Surgeon: Laz Rudolph Jr., MD;  Location: [facility] OR 1ST FLR;  Service: Urology;  Laterality: Right;    mastoid tumor removal  1969    Left mastoidectomy with residual left facial weakness.    NEPHROSTOMY      OOPHORECTOMY      PHACOEMULSIFICATION OF CATARACT Right 10/31/2018    Procedure: PHACOEMULSIFICATION-ASPIRATION-CATARACT;  Surgeon: Keysha Valle MD;  Location: [facility] OR;  Service: Ophthalmology;  Laterality: Right;    PYELOSCOPY Right 5/12/2023    Procedure: PYELOSCOPY;  Surgeon: Laz Rudolph Jr., MD;  Location: [facility] OR 1ST FLR;  Service: Urology;  Laterality: Right;    PYELOSCOPY Right 7/7/2023    Procedure: PYELOSCOPY;  Surgeon: Laz Rudolph Jr., MD;  Location: [facility] OR 1ST FLR;  Service: Urology;  Laterality: Right;    REMOVAL, STENT, URETER Right 5/12/2023    Procedure: REMOVAL, STENT, URETER;  Surgeon: Laz Rudolph Jr., MD;  Location: [facility] OR 1ST FLR;  Service: Urology;  Laterality: Right;    REMOVAL, STENT, URETER Right 7/7/2023    Procedure: REMOVAL, STENT, URETER;  Surgeon: Laz Rudolph Jr., MD;  Location: [facility] OR 1ST FLR;  Service: Urology;  Laterality: Right;    RETROGRADE PYELOGRAPHY Right 4/19/2023    Procedure: PYELOGRAM, RETROGRADE;  Surgeon: Domo Bates MD;  Location: [facility] OR 1ST FLR;  Service: Urology;  Laterality: Right;    TOTAL ABDOMINAL HYSTERECTOMY  1969    TAHBSO    TOTAL KNEE ARTHROPLASTY  9/13/2012    Left    TRANSFORAMINAL EPIDURAL INJECTION OF STEROID Bilateral 9/17/2019    Procedure:  INJECTION, STEROID, EPIDURAL, TRANSFORAMINAL APPROACH;  Surgeon: Oscar Ravi MD;  Location: [facility];  Service: Pain Management;  Laterality: Bilateral;  B/L TFESI L4/L5    TRANSFORAMINAL EPIDURAL INJECTION OF STEROID Bilateral 10/2/2020    Procedure: INJECTION, STEROID, EPIDURAL, TRANSFORAMINAL APPROACH;  Surgeon: Oscar Ravi MD;  Location: [facility];  Service: Pain Management;  Laterality: Bilateral;  B/L TFESI L4/5    TRANSFORAMINAL EPIDURAL INJECTION OF STEROID Bilateral 5/7/2021    Procedure: INJECTION, STEROID, EPIDURAL, TRANSFORAMINAL APPROACH, L4-L5 clear to hold Eliquis 3 days;  Surgeon: Oscar Ravi MD;  Location: [facility];  Service: Pain Management;  Laterality: Bilateral;    URETERAL STENT PLACEMENT Right 4/19/2023    Procedure: INSERTION, STENT, URETER;  Surgeon: Domo Bates MD;  Location: [facility] OR 1ST FLR;  Service: Urology;  Laterality: Right;    URETERAL STENT PLACEMENT N/A 5/12/2023    Procedure: INSERTION, STENT, URETER;  Surgeon: Laz Rudolph Jr., MD;  Location: [facility] OR 1ST FLR;  Service: Urology;  Laterality: N/A;    URETERAL STENT PLACEMENT Right 7/7/2023    Procedure: INSERTION, STENT, URETER;  Surgeon: Laz Rudolph Jr., MD;  Location: [facility] OR 1ST FLR;  Service: Urology;  Laterality: Right;    URETEROSCOPY Right 5/12/2023    Procedure: URETEROSCOPY;  Surgeon: Laz Rudolph Jr., MD;  Location: [facility] OR 1ST FLR;  Service: Urology;  Laterality: Right;    URETEROSCOPY Right 7/7/2023    Procedure: URETEROSCOPY;  Surgeon: Laz Rudolph Jr., MD;  Location: [facility] OR 1ST FLR;  Service: Urology;  Laterality: Right;     Family History   Problem Relation Age of Onset    Sleep apnea Sister     Diabetes Sister     Cancer Mother         brain tumor    Hypertension Mother     Heart disease Father     Heart attack Father     Dementia Brother     Lupus Brother     Frontotemporal dementia Brother     No Known Problems Daughter     No Known Problems Son     Diabetes Sister      Lupus Sister     Breast cancer Sister     Diabetes Sister     Cancer Sister         breast cancer    Heart attack Sister     Diabetes Sister     Liver cancer Brother     Drug abuse Brother     Alcohol abuse Brother     Cirrhosis Brother     Drug abuse Brother     No Known Problems Daughter     No Known Problems Son     No Known Problems Son     No Known Problems Maternal Grandmother     No Known Problems Maternal Grandfather     No Known Problems Paternal Grandmother     No Known Problems Paternal Grandfather     Diabetes Brother     Dementia Brother     Diabetes Other     Ovarian cancer Other         Niece     Breast cancer Maternal Aunt     No Known Problems Maternal Uncle     No Known Problems Paternal Aunt     No Known Problems Paternal Uncle     Glaucoma Neg Hx     Blindness Neg Hx     Celiac disease Neg Hx     Colon cancer Neg Hx     Colon polyps Neg Hx     Esophageal cancer Neg Hx     Inflammatory bowel disease Neg Hx     Liver disease Neg Hx     Rectal cancer Neg Hx     Stomach cancer Neg Hx     Ulcerative colitis Neg Hx     Melanoma Neg Hx     Multiple sclerosis Neg Hx     Psoriasis Neg Hx     Amblyopia Neg Hx     Cataracts Neg Hx     Macular degeneration Neg Hx     Retinal detachment Neg Hx     Strabismus Neg Hx     Stroke Neg Hx     Thyroid disease Neg Hx      Social History     Tobacco Use    Smoking status: Former     Current packs/day: 0.00     Average packs/day: 1 pack/day for 15.0 years (15.0 ttl pk-yrs)     Types: Cigarettes     Start date: 1985     Quit date: 2000     Years since quittin.2    Smokeless tobacco: Never   Substance Use Topics    Alcohol use: No    Drug use: No     Review of Systems   Constitutional:  Negative for fever.   HENT:  Positive for congestion, rhinorrhea (resolved, now dry nose), sinus pressure and sinus pain. Negative for sore throat.    Respiratory:  Negative for shortness of breath.    Cardiovascular:  Negative for chest pain.   Gastrointestinal:   Negative for nausea.   Genitourinary:  Negative for dysuria.   Musculoskeletal:  Negative for back pain.   Skin:  Negative for rash.   Neurological:  Negative for weakness.   Hematological:  Does not bruise/bleed easily.       Physical Exam     Initial Vitals [10/01/23 1329]   BP Pulse Resp Temp SpO2   127/66 81 16 98 °F (36.7 °C) 98 %      MAP       --         Physical Exam    Vitals reviewed.  Constitutional: She appears well-developed and well-nourished. She is not diaphoretic. She is cooperative.  Non-toxic appearance. She does not have a sickly appearance. She does not appear ill. No distress.   HENT:   Head: Normocephalic and atraumatic.   Nose: Sinus tenderness present. No rhinorrhea, nose lacerations, nasal deformity, septal deviation or nasal septal hematoma. No epistaxis.  No foreign bodies. Right sinus exhibits maxillary sinus tenderness and frontal sinus tenderness. Left sinus exhibits maxillary sinus tenderness and frontal sinus tenderness.   Mouth/Throat: No trismus in the jaw.   Chronic facial weakness on the left from facial paralysis s/p left mastoidectomy in ~ 1969.  No facial swelling appreciated.  No angioedema.   Eyes: Conjunctivae and EOM are normal.   Neck:   Normal range of motion.  Pulmonary/Chest: No accessory muscle usage. No tachypnea. No respiratory distress.   Abdominal: She exhibits no distension. There is no abdominal tenderness.   Musculoskeletal:         General: Normal range of motion.      Cervical back: Normal range of motion.     Neurological: She is alert. She has normal strength.   Skin: Skin is warm and dry. No erythema. No pallor.         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    Differential diagnosis includes but is not limited to viral syndrome, sinus congestion, allergic rhinitis, sinus headache.  There is no purulent discharge from her nares.  She is afebrile and nontoxic appearing.  Doubt bacterial sinusitis.  There is no rashes on her face.  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    Patient has frontal and maxillary sinus tenderness to palpation.  She endorses a burning sensation when she breathes in and out of her nose.  She has a reassuring physical exam.  I think her symptoms are consistent with sinus pressure likely from viral syndrome.  She is not sneezing or has a itchy throat so I do not think this is allergic rhinitis or sinusitis.  I recommend OTC acetaminophen.  Will give 1st dose here.  Also recommend OTC decongestants safe in hypertensive patient's such as Coricidin and nasal saline sprays. Use at home humidifiers.  She does not need any emergent labs or imaging at this time as it is of little utility. Will have her follow up. Return to ER precautions given.     Risk  OTC drugs.               ED Course as of 10/01/23 1437   Sun Oct 01, 2023   1353 BP: 127/66 [CL]   1354 Temp: 98 °F (36.7 °C) [CL]   1354 Pulse: 81 [CL]   1354 Resp: 16 [CL]   1354 SpO2: 98 % [CL]      ED Course User Index  [CL] Andreina Aguiar PA-C                    Clinical Impression:   Final diagnoses:  [J34.89] Sinus pain (Primary)  [R09.81] Nasal congestion        ED Disposition Condition    Discharge Stable          Future Appointments   Date Time Provider Department Center   10/4/2023 12:30 PM Jeannine Arriaga MD [facility] [address] [place]          Andreina Aguiar PA-C  10/01/23 1437

## 2023-10-01 NOTE — DISCHARGE INSTRUCTIONS
Your pain is likely coming from your sinuses.   Take over the counter decongestant (coricidin) which is safe for patients with high blood pressure.   Use over the counter nasal saline sprays to help moisturize your nasal passages.   You can take acetaminophen/tylenol 650 mg every 6 hours or 1000 mg every 8 hours for added relief.  You can consider using a humidifier or stand near a pot of boiling water.  Follow up with PCP.  Return to the ER for new or worsening symptoms.  Future Appointments   Date Time Provider Department Center   10/4/2023 12:30 PM Jeannine Arriaga MD [facility] [place]

## 2023-10-04 ENCOUNTER — OFFICE VISIT (OUTPATIENT)
Dept: INTERNAL MEDICINE | Facility: CLINIC | Age: 83
End: 2023-10-04
Attending: PHYSICIAN ASSISTANT
Payer: MEDICARE

## 2023-10-04 VITALS
SYSTOLIC BLOOD PRESSURE: 102 MMHG | DIASTOLIC BLOOD PRESSURE: 60 MMHG | WEIGHT: 202.5 LBS | OXYGEN SATURATION: 98 % | HEART RATE: 76 BPM | BODY MASS INDEX: 30.69 KG/M2 | HEIGHT: 68 IN

## 2023-10-04 DIAGNOSIS — Z79.4 TYPE 2 DIABETES MELLITUS WITHOUT COMPLICATION, WITH LONG-TERM CURRENT USE OF INSULIN: Primary | ICD-10-CM

## 2023-10-04 DIAGNOSIS — F39 MOOD DISORDER: ICD-10-CM

## 2023-10-04 DIAGNOSIS — K21.9 GASTROESOPHAGEAL REFLUX DISEASE WITHOUT ESOPHAGITIS: ICD-10-CM

## 2023-10-04 DIAGNOSIS — E78.2 MIXED HYPERLIPIDEMIA: ICD-10-CM

## 2023-10-04 DIAGNOSIS — I10 ESSENTIAL HYPERTENSION: Chronic | ICD-10-CM

## 2023-10-04 DIAGNOSIS — K21.9 GASTROESOPHAGEAL REFLUX DISEASE, UNSPECIFIED WHETHER ESOPHAGITIS PRESENT: ICD-10-CM

## 2023-10-04 DIAGNOSIS — M48.062 SPINAL STENOSIS OF LUMBAR REGION WITH NEUROGENIC CLAUDICATION: ICD-10-CM

## 2023-10-04 DIAGNOSIS — E11.9 TYPE 2 DIABETES MELLITUS WITHOUT COMPLICATION, WITH LONG-TERM CURRENT USE OF INSULIN: Primary | ICD-10-CM

## 2023-10-04 PROCEDURE — 3078F DIAST BP <80 MM HG: CPT | Mod: CPTII,S$GLB,, | Performed by: INTERNAL MEDICINE

## 2023-10-04 PROCEDURE — 99214 PR OFFICE/OUTPT VISIT, EST, LEVL IV, 30-39 MIN: ICD-10-PCS | Mod: S$GLB,,, | Performed by: INTERNAL MEDICINE

## 2023-10-04 PROCEDURE — 99999 PR PBB SHADOW E&M-EST. PATIENT-LVL III: ICD-10-PCS | Mod: PBBFAC,,, | Performed by: INTERNAL MEDICINE

## 2023-10-04 PROCEDURE — 3078F PR MOST RECENT DIASTOLIC BLOOD PRESSURE < 80 MM HG: ICD-10-PCS | Mod: CPTII,S$GLB,, | Performed by: INTERNAL MEDICINE

## 2023-10-04 PROCEDURE — 99999 PR PBB SHADOW E&M-EST. PATIENT-LVL III: CPT | Mod: PBBFAC,,, | Performed by: INTERNAL MEDICINE

## 2023-10-04 PROCEDURE — 3074F PR MOST RECENT SYSTOLIC BLOOD PRESSURE < 130 MM HG: ICD-10-PCS | Mod: CPTII,S$GLB,, | Performed by: INTERNAL MEDICINE

## 2023-10-04 PROCEDURE — 99214 OFFICE O/P EST MOD 30 MIN: CPT | Mod: S$GLB,,, | Performed by: INTERNAL MEDICINE

## 2023-10-04 PROCEDURE — 3074F SYST BP LT 130 MM HG: CPT | Mod: CPTII,S$GLB,, | Performed by: INTERNAL MEDICINE

## 2023-10-04 RX ORDER — ACETAMINOPHEN 500 MG
2000 TABLET ORAL DAILY
COMMUNITY

## 2023-10-04 RX ORDER — ROPINIROLE 0.5 MG/1
0.5 TABLET, FILM COATED ORAL NIGHTLY
Qty: 30 TABLET | Refills: 0 | Status: SHIPPED | OUTPATIENT
Start: 2023-10-04 | End: 2023-11-02 | Stop reason: SDUPTHER

## 2023-10-04 RX ORDER — PANTOPRAZOLE SODIUM 40 MG/1
40 TABLET, DELAYED RELEASE ORAL DAILY
Qty: 90 TABLET | Refills: 1 | Status: SHIPPED | OUTPATIENT
Start: 2023-10-04 | End: 2024-04-03 | Stop reason: SDUPTHER

## 2023-10-04 RX ORDER — OXYCODONE AND ACETAMINOPHEN 10; 325 MG/1; MG/1
1 TABLET ORAL
Qty: 150 TABLET | Refills: 0 | Status: SHIPPED | OUTPATIENT
Start: 2023-10-04 | End: 2023-11-02 | Stop reason: SDUPTHER

## 2023-10-04 RX ORDER — CARVEDILOL 25 MG/1
25 TABLET ORAL 2 TIMES DAILY
Qty: 180 TABLET | Refills: 1 | Status: SHIPPED | OUTPATIENT
Start: 2023-10-04 | End: 2024-04-03 | Stop reason: SDUPTHER

## 2023-10-04 NOTE — PROGRESS NOTES
CHIEF COMPLAINT:   Follow up of multple problems.      HISTORY OF PRESENT ILLNESS: This is a 83-year-old woman who presents for follow up of above    She was in the ED on 10/1/23 due facial pain - thought to be due to sinuses.  Symptoms have resolved       She was in the ED on 9/23/23 due to a low blood sugar    She was hospitalized 9/15/23 to 9/17/23 due to pneumonia.  She completed course of augmentin and zithromax. Her cough has resolved. Breathing is ok.       She is taking Trulicity 0.75 m g once a week on a Thursday. She is taking lantus 30 units nighlty, and novolog 10 units with eating. Blood sugars are around 120.  No nausea or vomiting     No cough, fever, chills, sinus congestion, sore throat. She contineus to have some shortness of breath.      She has some of her pill bottles with her today. She does not have percocet, pantoprazole, duloxetine, requip or her insulins. She states she is her percocet is at home - has 3-4 left     She still has had itching on both arms and some of her legs.  No rash. Itching comes and goes. She takes benadryl at night which helps her sleep.      She continues to have neck and shoulder pain, back pain. She complains of back pain and body pain  which has been severe. She is taking oxycodone apap 10/325 up to 5 times daily.   She continues to take duloxetine 60 mg twice daily.  Her mood is doing ok.        She saw Dr Reilly on 1/18/22 - she continues to take Eliquis 5 mg wice daily.  She had cardiac PET stress 2/9/2022 which was negative for ischemia          No fever or chills. She has felt cold.  No nausea, vomiting, constipation, dysuria, hematuira.  She denies swallowing problems today.        She is living alone. She does not see her grandson very much. She has a niece, Kacie Mast who lives locally.      She is taking  coreg 25 mg twice daily for her hypertension and diastolic dysfunction.     She is taking requipo 0.5  mg at bedtime - for helping the restless leg    "  She had a right ureteroscopy with laser lithotripsy and stone basketing on 5/12/23.  Her pain from the stone has resolved.  She still has a urinary stent.         PAST MEDICAL HISTORY:   1. Diabetes mellitus   2. Hypertension   3. Hyperlipidemia   4. Left heart catheterization January 2007 which revealed luminal irregularities in the LAD, left circumflex and right coronary artery. She had diastolic dysfunction and patent renal arteries.   5. Sleep apnea   6. Obesity.   7. Nephrolithiasis status post lithotripsy.   8. Reflux - had nonerosive gastropathy on EGD September 2007. Gastritis on EGD 9/2010  9. Hidradenitis suppurativa.   10. History of diverticulitis with a hospitalized October 2007.   11. Migraines   12. Obesity.   13. 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    14. Total hysterectomy in 1969.   15. Cholecystectomy was done at that time as well.   16. 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   17. Colon polyps on colonscopy 11/2010 - due 2013   18. Hospitalization 12/10 for e coli sepsis due to left ureteral stone with left nephrostomy tube in [place], [place]   19. Left knee replacement 9/2012      MEDICATIONS and ALLERGIES: Updated on EPIC.     PHYSICAL EXAMINATION:     /60   Pulse 76   Ht 5' 8" (1.727 m)   Wt 91.8 kg (202 lb 7.9 oz)   LMP  (LMP Unknown)   SpO2 98%   BMI 30.79 kg/m²         GENERAL: She is alert, oriented, no apparent distress. Affect within   normal limits.  Conjunctiva anicteric.    NECK: Supple.   Respiratory: Effort normal. Lungs clear  HEART: Regular rate and rhythm without murmurs, gallops or rubs.   No lower extremity edema.  .            ASSESSMENT AND PLAN:      1. Diabetes - doing better - will not make any changes at this point in time.  Continue to eat regulary. Ideally would like to titrate Trulicity and lower insulin but due to memory issues, afraid to make changes in regime. Case " managemnet referral due to multiple hospitalizations.   2. Kidney stones - asx  3. Hypertension with diastolic heart failure- stable. Negative PET stress.  stable on carvedilol 25 mg one tablet twice daily.   4.  OA neck and lumbar spine - on oxycodone. stable  5.  Gout - asx off allopurinol  6.  Mood disorder -  on duloxetine 60 mg twice daily  7.  Swallowing disorder - s/p EGD with dilation on 11/11/21.   8. Asthma - stable   9. Gerd -get on pantoprazole 40 mg  daily   10. Hyperlipidemia - off  lipitor  11. Allergic rhinitis - stable  12. CRI -stable  13. Obesity - discussed diet, exercise and weight loss  14. CAD -risk factor modification  15. Aortic atherosclerosis and possible mesenteric artery stenosis- risk factor modification  16. Tortuous aorta - HTN controlled  17. Colon polyps - Colonoscopy in 8/2013 - 2 polyps. Flex sig 11/2013 - mild granular mucosa. Colonoscopy 6/2017 diverticulosis - no polyps due 2023.   18. hyperparathryodisism -saw nephrology   19. .Restless leg syndrome - get on roprinole       .  MMG 7/22        I will see her back in 1 month sooner if problems arise

## 2023-11-02 RX ORDER — OXYCODONE AND ACETAMINOPHEN 10; 325 MG/1; MG/1
1 TABLET ORAL
Qty: 150 TABLET | Refills: 0 | Status: SHIPPED | OUTPATIENT
Start: 2023-11-02 | End: 2023-12-06 | Stop reason: SDUPTHER

## 2023-11-02 RX ORDER — ROPINIROLE 0.5 MG/1
0.5 TABLET, FILM COATED ORAL NIGHTLY
Qty: 30 TABLET | Refills: 0 | Status: SHIPPED | OUTPATIENT
Start: 2023-11-02 | End: 2024-01-03 | Stop reason: SDUPTHER

## 2023-11-02 NOTE — TELEPHONE ENCOUNTER
No care due was identified.  [facility] Embedded Care Due Messages. Reference number: [id number].   11/02/2023 3:15:10 PM CDT

## 2023-11-09 ENCOUNTER — TELEPHONE (OUTPATIENT)
Dept: INTERNAL MEDICINE | Facility: CLINIC | Age: 83
End: 2023-11-09

## 2023-11-09 NOTE — TELEPHONE ENCOUNTER
----- Message from Susan Keating sent at 11/9/2023  9:02 AM CST -----  Contact: [phone] @ patient  Good morning patient would like a call back to see if she can get a earlier apt than the one she has on 12/6. Please give patient a call back [phone]

## 2023-11-10 ENCOUNTER — OFFICE VISIT (OUTPATIENT)
Dept: INTERNAL MEDICINE | Facility: CLINIC | Age: 83
End: 2023-11-10
Payer: MEDICARE

## 2023-11-10 ENCOUNTER — PATIENT OUTREACH (OUTPATIENT)
Dept: ADMINISTRATIVE | Facility: OTHER | Age: 83
End: 2023-11-10
Payer: MEDICARE

## 2023-11-10 VITALS
SYSTOLIC BLOOD PRESSURE: 152 MMHG | WEIGHT: 207 LBS | TEMPERATURE: 98 F | HEIGHT: 68 IN | DIASTOLIC BLOOD PRESSURE: 80 MMHG | BODY MASS INDEX: 31.37 KG/M2

## 2023-11-10 DIAGNOSIS — R09.82 POSTNASAL DRIP: ICD-10-CM

## 2023-11-10 DIAGNOSIS — Z79.4 TYPE 2 DIABETES MELLITUS WITHOUT COMPLICATION, WITH LONG-TERM CURRENT USE OF INSULIN: ICD-10-CM

## 2023-11-10 DIAGNOSIS — R60.0 PEDAL EDEMA: ICD-10-CM

## 2023-11-10 DIAGNOSIS — E11.9 TYPE 2 DIABETES MELLITUS WITHOUT COMPLICATION, WITH LONG-TERM CURRENT USE OF INSULIN: ICD-10-CM

## 2023-11-10 DIAGNOSIS — I10 ESSENTIAL HYPERTENSION: Primary | Chronic | ICD-10-CM

## 2023-11-10 DIAGNOSIS — J40 BRONCHITIS: ICD-10-CM

## 2023-11-10 PROCEDURE — 3288F FALL RISK ASSESSMENT DOCD: CPT | Mod: CPTII,S$GLB,, | Performed by: NURSE PRACTITIONER

## 2023-11-10 PROCEDURE — 3288F PR FALLS RISK ASSESSMENT DOCUMENTED: ICD-10-PCS | Mod: CPTII,S$GLB,, | Performed by: NURSE PRACTITIONER

## 2023-11-10 PROCEDURE — 3077F SYST BP >= 140 MM HG: CPT | Mod: CPTII,S$GLB,, | Performed by: NURSE PRACTITIONER

## 2023-11-10 PROCEDURE — 1159F PR MEDICATION LIST DOCUMENTED IN MEDICAL RECORD: ICD-10-PCS | Mod: CPTII,S$GLB,, | Performed by: NURSE PRACTITIONER

## 2023-11-10 PROCEDURE — 99999 PR PBB SHADOW E&M-EST. PATIENT-LVL V: CPT | Mod: PBBFAC,,, | Performed by: NURSE PRACTITIONER

## 2023-11-10 PROCEDURE — 3079F PR MOST RECENT DIASTOLIC BLOOD PRESSURE 80-89 MM HG: ICD-10-PCS | Mod: CPTII,S$GLB,, | Performed by: NURSE PRACTITIONER

## 2023-11-10 PROCEDURE — 3077F PR MOST RECENT SYSTOLIC BLOOD PRESSURE >= 140 MM HG: ICD-10-PCS | Mod: CPTII,S$GLB,, | Performed by: NURSE PRACTITIONER

## 2023-11-10 PROCEDURE — 99999 PR PBB SHADOW E&M-EST. PATIENT-LVL V: ICD-10-PCS | Mod: PBBFAC,,, | Performed by: NURSE PRACTITIONER

## 2023-11-10 PROCEDURE — 1159F MED LIST DOCD IN RCRD: CPT | Mod: CPTII,S$GLB,, | Performed by: NURSE PRACTITIONER

## 2023-11-10 PROCEDURE — 99214 OFFICE O/P EST MOD 30 MIN: CPT | Mod: S$GLB,,, | Performed by: NURSE PRACTITIONER

## 2023-11-10 PROCEDURE — 1100F PR PT FALLS ASSESS DOC 2+ FALLS/FALL W/INJURY/YR: ICD-10-PCS | Mod: CPTII,S$GLB,, | Performed by: NURSE PRACTITIONER

## 2023-11-10 PROCEDURE — 99214 PR OFFICE/OUTPT VISIT, EST, LEVL IV, 30-39 MIN: ICD-10-PCS | Mod: S$GLB,,, | Performed by: NURSE PRACTITIONER

## 2023-11-10 PROCEDURE — 3079F DIAST BP 80-89 MM HG: CPT | Mod: CPTII,S$GLB,, | Performed by: NURSE PRACTITIONER

## 2023-11-10 PROCEDURE — 1100F PTFALLS ASSESS-DOCD GE2>/YR: CPT | Mod: CPTII,S$GLB,, | Performed by: NURSE PRACTITIONER

## 2023-11-10 RX ORDER — BENZONATATE 100 MG/1
100 CAPSULE ORAL 3 TIMES DAILY PRN
Qty: 30 CAPSULE | Refills: 0 | Status: SHIPPED | OUTPATIENT
Start: 2023-11-10 | End: 2023-12-06 | Stop reason: SDUPTHER

## 2023-11-10 RX ORDER — DOXYCYCLINE 100 MG/1
100 CAPSULE ORAL EVERY 12 HOURS
Qty: 14 CAPSULE | Refills: 0 | Status: SHIPPED | OUTPATIENT
Start: 2023-11-10 | End: 2023-12-06

## 2023-11-10 RX ORDER — LORATADINE 10 MG/1
10 TABLET ORAL DAILY PRN
Qty: 30 TABLET | Refills: 1 | Status: SHIPPED | OUTPATIENT
Start: 2023-11-10 | End: 2024-11-09

## 2023-11-10 RX ORDER — DULAGLUTIDE 0.75 MG/.5ML
0.75 INJECTION, SOLUTION SUBCUTANEOUS
Qty: 4 PEN | Refills: 2 | Status: SHIPPED | OUTPATIENT
Start: 2023-11-10 | End: 2023-12-06 | Stop reason: SDUPTHER

## 2023-11-10 NOTE — PROGRESS NOTES
INTERNAL MEDICINE PROGRESS/URGENT CARE NOTE    CHIEF COMPLAINT     Chief Complaint   Patient presents with    Leg Swelling     bilateral       HPI     Henrietta Villasenor is a 83 y.o. female who presents for an urgent/follow up visit today.  PCP: Dr. Arriaga    Pt with extensive hx significant for CHF, DM, B leg edema, dvt, venous insufficiency, CKD3, CAD, PAD, HTN, chronic pain here for intermittent leg swelling x months- particularly her ankles and feet.    Reports cough and wheezing x 1 week. Nonproductive. No fevers, chest pain, sob, chills, body aches. Had PNA about 2 months ago. She has albuterol inhaler which she tells me helps her.    We went over her meds together. Didn't bring her bottles though. She did not take her BP medications    She lives alone. Grandson sometimes checks on her. But she tells me her daughter should be coming down to stay with her.     Sugars have been okay. Denies any recent lows. Was 125 mg/dl this AM fasting. Needs a refill of her trulicity. Last A1c 6.4    Past Medical History:  Past Medical History:   Diagnosis Date    Abnormality of lung 11/08/2011    Stable bandlike opacities at the lung bases, most likely representing      Anxiety     Arthritis     CAD (coronary artery disease)     Calculus of ureter 2/22/2011    CHF (congestive heart failure)     Chronic back pain 7/29/2012    Colon polyp 9/2010; 11/2010    Colon polyps 7/29/2012    Coronary artery disease involving native coronary artery of native heart with angina pectoris     Depression     Diabetes mellitus     Diabetes mellitus type II     Diverticulitis large intestine 7/27/2015    Diverticulitis of large intestine without perforation or abscess without bleeding     Diverticulosis 09/25/2010; 11/02/2010; 11/08/2011; 7/29/2012    Duodenal disorder 08/25/2011    Duodenal erosion noted on EGD.    Duodenal ulcer, unspecified as acute or chronic, without hemorrhage, perforation, or obstruction 8/24/2011    E. coli sepsis  12/2010    Due to left ureteral stone with left nephrostomy tube - hospitalized in [place]    Esophageal dysmotility 01/24/2012    Noted on upper GI-barium swallow.    Facial weakness 1969    Left facial weakness s/p left mastoidectomy in ~ 1969.    Fatty liver 11/08/2011    Reported on CT-abdomen and in 06/2012 Gastro clinic visit note.    Gastric polyp 09/29/2010    GERD (gastroesophageal reflux disease) 7/29/2012    Hepatomegaly 11/08/2011    Reported on CT-abdomen    Herpes zoster with other nervous system complications(053.19) 2/28/2011    Hiatal hernia 06/26/2006; 09/29/2010; 08/25/2011    Noted on barium swallow 2006; noted on  EGD 2011.    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    Trouble in sleeping     Type II or unspecified type diabetes mellitus with neurological manifestations, not stated as uncontrolled(250.60)     Type II or unspecified type diabetes mellitus with peripheral circulatory disorders, not stated as uncontrolled(250.70)     Type II or unspecified type diabetes mellitus with renal manifestations, not stated as uncontrolled(250.40)         Past Surgical History:  Past Surgical History:   Procedure Laterality Date    BELPHAROPTOSIS REPAIR      s/p LAMBERTO levator  repair - Dr. Dejesus    CARDIAC CATHETERIZATION      CARPAL TUNNEL RELEASE  3/13/2012    CATARACT EXTRACTION W/  INTRAOCULAR LENS IMPLANT Right 10/31/2018        CATARACT EXTRACTION W/  INTRAOCULAR LENS IMPLANT Left 03/22/2018        CHOLECYSTECTOMY      COLONOSCOPY N/A 11/16/2016    Procedure: COLONOSCOPY;  Surgeon: Chris Storey MD;  Location: [facility] (4TH FLR);  Service: Endoscopy;  Laterality: N/A;    COLONOSCOPY N/A 6/14/2017    Procedure: COLONOSCOPY;  Surgeon: Chris Storey MD;  Location: [facility] ENDO (4TH FLR);  Service: Endoscopy;  Laterality: N/A;  colonoscopy in 3 months with better bowel prep. - Split PEG prep ordered    CYSTOSCOPY  4/19/2023    Procedure: CYSTOSCOPY;  Surgeon: Domo Bates MD;  Location: [facility] OR [address] [place] [address];  Service: Urology;;    CYSTOSCOPY N/A 5/12/2023    Procedure: CYSTOSCOPY;  Surgeon: Laz Rudolph Jr., MD;  Location: [facility] OR [address] [place] [address];  Service: Urology;  Laterality: N/A;    CYSTOSCOPY N/A 7/7/2023    Procedure: CYSTOSCOPY;  Surgeon: Laz Rudolph Jr., MD;  Location: [facility] OR [address] [place] [address];  Service: Urology;  Laterality: N/A;    ESOPHAGOGASTRODUODENOSCOPY N/A 11/10/2021    Procedure: EGD (ESOPHAGOGASTRODUODENOSCOPY);  Surgeon: Kuldeep Velázquez MD;  Location: [address]);  Service: Endoscopy;  Laterality: N/A;  11/4-eliquis hold ok see te -tb-fully vacc-inst mail and verbal-    EXTRACORPOREAL SHOCK WAVE LITHOTRIPSY      EXTRACTION - STONE Right 5/12/2023    Procedure: EXTRACTION - STONE;  Surgeon: Laz Rudolph Jr., MD;  Location: [facility] OR [address] [place] [address];  Service: Urology;  Laterality: Right;    INJECTION OF ANESTHETIC AGENT AROUND NERVE Bilateral 6/24/2020    Procedure: BLOCK, NERVE, C4-C5-C6 MEDIAL BRANCH ok per Talia to add on;  Surgeon: Oscar Ravi MD;  Location: [facility] PAIN MGT;  Service: Pain Management;  Laterality: Bilateral;    INTRAOCULAR PROSTHESES INSERTION Right 10/31/2018    Procedure: INSERTION-INTRAOCULAR LENS (IOL);  Surgeon: Keysha MENCHACA  MD Leonard;  Location: [facility] OR;  Service: Ophthalmology;  Laterality: Right;    JOINT REPLACEMENT      LASER LITHOTRIPSY Right 5/12/2023    Procedure: LITHOTRIPSY, USING LASER;  Surgeon: Laz Rudolph Jr., MD;  Location: [facility] OR 1ST FLR;  Service: Urology;  Laterality: Right;    LASER LITHOTRIPSY Right 7/7/2023    Procedure: LITHOTRIPSY, USING LASER;  Surgeon: Laz Rudolph Jr., MD;  [facility];  Service: Urology;  Laterality: Right;    mastoid tumor removal  1969    Left mastoidectomy with residual left facial weakness.    NEPHROSTOMY      OOPHORECTOMY      PHACOEMULSIFICATION OF CATARACT Right 10/31/2018    Procedure: PHACOEMULSIFICATION-ASPIRATION-CATARACT;  Surgeon: Keysha Valle MD;  Location: [facility] OR;  Service: Ophthalmology;  Laterality: Right;    PYELOSCOPY Right 5/12/2023    Procedure: PYELOSCOPY;  Surgeon: Laz Rudolph Jr., MD;  [facility];  Service: Urology;  Laterality: Right;    PYELOSCOPY Right 7/7/2023    Procedure: PYELOSCOPY;  Surgeon: Laz Rudolph Jr., MD;  [facility];  Service: Urology;  Laterality: Right;    REMOVAL, STENT, URETER Right 5/12/2023    Procedure: REMOVAL, STENT, URETER;  Surgeon: Laz Rudolph Jr., MD;  [facility];  Service: Urology;  Laterality: Right;    REMOVAL, STENT, URETER Right 7/7/2023    Procedure: REMOVAL, STENT, URETER;  Surgeon: Laz Rudolph Jr., MD;  [facility];  Service: Urology;  Laterality: Right;    RETROGRADE PYELOGRAPHY Right 4/19/2023    Procedure: PYELOGRAM, RETROGRADE;  Surgeon: Domo Bates MD;  [facility];  Service: Urology;  Laterality: Right;    TOTAL ABDOMINAL HYSTERECTOMY  1969    TAHBSO    TOTAL KNEE ARTHROPLASTY  9/13/2012    Left    TRANSFORAMINAL EPIDURAL INJECTION OF STEROID Bilateral 9/17/2019    Procedure: INJECTION, STEROID, EPIDURAL, TRANSFORAMINAL APPROACH;  Surgeon: Oscar Ravi MD;  Location: [facility] PAIN MGT;  Service: Pain  Management;  Laterality: Bilateral;  B/L TFESI L4/L5    TRANSFORAMINAL EPIDURAL INJECTION OF STEROID Bilateral 10/2/2020    Procedure: INJECTION, STEROID, EPIDURAL, TRANSFORAMINAL APPROACH;  Surgeon: Oscar Ravi MD;  Location: [facility] PAIN MGT;  Service: Pain Management;  Laterality: Bilateral;  B/L TFESI L4/5    TRANSFORAMINAL EPIDURAL INJECTION OF STEROID Bilateral 5/7/2021    Procedure: INJECTION, STEROID, EPIDURAL, TRANSFORAMINAL APPROACH, L4-L5 clear to hold Eliquis 3 days;  Surgeon: Oscar Ravi MD;  Location: [facility] PAIN MGT;  Service: Pain Management;  Laterality: Bilateral;    URETERAL STENT PLACEMENT Right 4/19/2023    Procedure: INSERTION, STENT, URETER;  Surgeon: Domo Bates MD;  Location: [facility] OR [facility] FLR;  Service: Urology;  Laterality: Right;    URETERAL STENT PLACEMENT N/A 5/12/2023    Procedure: INSERTION, STENT, URETER;  Surgeon: Laz Rudolph Jr., MD;  Location: [facility] OR [facility] FLR;  Service: Urology;  Laterality: N/A;    URETERAL STENT PLACEMENT Right 7/7/2023    Procedure: INSERTION, STENT, URETER;  Surgeon: Laz Rudolph Jr., MD;  [facility];  Service: Urology;  Laterality: Right;    URETEROSCOPY Right 5/12/2023    Procedure: URETEROSCOPY;  Surgeon: Laz Rudolph Jr., MD;  [facility];  Service: Urology;  Laterality: Right;    URETEROSCOPY Right 7/7/2023    Procedure: URETEROSCOPY;  Surgeon: Laz Rudolhp Jr., MD;  [facility];  Service: Urology;  Laterality: Right;        Allergies:  Review of patient's allergies indicates:   Allergen Reactions    Crestor [rosuvastatin]      Cramping in legs    Ezetimibe Diarrhea     Other reaction(s): abdominal pain, Diarrhea      Hydrocodone Itching    Lisinopril      Other reaction(s): cough      Sulfa (sulfonamide antibiotics) Itching and Rash           Sulfamethoxazole-trimethoprim     Valsartan Swelling       Home Medications:    Current Outpatient Medications:     ACCU-CHEK GUIDE TEST STRIPS Strp,  "TEST BLOOD SUGAR THREE TIMES DAILY, Disp: 300 strip, Rfl: 3    ACCU-CHEK SOFT DEV LANCETS Kit, , Disp: , Rfl:     albuterol (PROVENTIL/VENTOLIN HFA) 90 mcg/actuation inhaler, Inhale 2 puffs by mouth into the lungs every 4 (four) hours as needed for Wheezing. Rescue, Disp: 25.5 g, Rfl: 3    amLODIPine (NORVASC) 10 MG tablet, Take 1 tablet (10 mg total) by mouth once daily., Disp: 30 tablet, Rfl: 0    apixaban (ELIQUIS) 5 mg Tab, Take 1 tablet (5 mg total) by mouth 2 (two) times daily., Disp: 60 tablet, Rfl: 0    atorvastatin (LIPITOR) 40 MG tablet, Take 1 tablet (40 mg total) by mouth once daily., Disp: 90 tablet, Rfl: 1    BD ULTRA-FINE OLU PEN NEEDLE 32 gauge x 5/32" Ndle, Uses 4 times daily, on multiple daily insulin injections, Disp: 130 each, Rfl: 12    blood-glucose meter kit, Use as instructed, Disp: 1 each, Rfl: 0    carvediloL (COREG) 25 MG tablet, Take 1 tablet (25 mg total) by mouth 2 (two) times daily., Disp: 180 tablet, Rfl: 1    cholecalciferol, vitamin D3, (VITAMIN D3) 50 mcg (2,000 unit) Cap capsule, Take 2,000 Units by mouth once daily., Disp: , Rfl:     cholecalciferol, vitamin D3, 125 mcg (5,000 unit) capsule, Take 5,000 Units by mouth once daily., Disp: , Rfl:     DROPLET PEN NEEDLE 29 gauge x 1/2" Ndle, USE FOUR TIMES DAILY, Disp: 400 each, Rfl: 3    DROPSAFE ALCOHOL PREP PADS PadM, USE TOPICALLY TO TEST BLOOD SUGAR FOUR TIMES DAILY, Disp: 400 each, Rfl: 3    DULoxetine (CYMBALTA) 60 MG capsule, Take 1 capsule (60 mg total) by mouth 2 (two) times daily. Please hold until you follow up with your primary care provider, Disp: 180 capsule, Rfl: 1    furosemide (LASIX) 20 MG tablet, Take 1 tablet by mouth on Monday, Wednesday, and Friday, Disp: 30 tablet, Rfl: 3    insulin (LANTUS SOLOSTAR U-100 INSULIN) glargine 100 units/mL SubQ pen, Inject 40 Units into the skin once daily. (Patient taking differently: Inject 30 Units into the skin every evening.), Disp: , Rfl:     insulin aspart U-100 (NOVOLOG) " 100 unit/mL (3 mL) InPn pen, Inject 15 units before meals, hold if sugar is less than 100. Max daily 45 units. (Patient taking differently: Inject 10 units before meals, hold if sugar is less than 100. Max daily 45 units.), Disp: 45 mL, Rfl: 3    lancets (ACCU-CHEK SOFTCLIX LANCETS) Misc, TEST BLOOD SUGAR FOUR TIMES DAILY, Disp: 400 each, Rfl: 3    oxyCODONE-acetaminophen (PERCOCET)  mg per tablet, Take 1 tablet by mouth 5 (five) times daily., Disp: 150 tablet, Rfl: 0    pantoprazole (PROTONIX) 40 MG tablet, Take 1 tablet (40 mg total) by mouth once daily., Disp: 90 tablet, Rfl: 1    rOPINIRole (REQUIP) 0.5 MG tablet, Take 1 tablet (0.5 mg total) by mouth every evening., Disp: 30 tablet, Rfl: 0    benzonatate (TESSALON) 100 MG capsule, Take 1 capsule (100 mg total) by mouth 3 (three) times daily as needed for Cough., Disp: 30 capsule, Rfl: 0    doxycycline (VIBRAMYCIN) 100 MG Cap, Take 1 capsule (100 mg total) by mouth every 12 (twelve) hours., Disp: 14 capsule, Rfl: 0    dulaglutide (TRULICITY) 0.75 mg/0.5 mL pen injector, Inject 0.75 mg into the skin every 7 days., Disp: 4 pen , Rfl: 2    fluticasone propionate (FLONASE) 50 mcg/actuation nasal spray, Instill 2 sprays in each nostril once daily. (Patient not taking: Reported on 10/4/2023), Disp: 16 g, Rfl: 1    loratadine (CLARITIN) 10 mg tablet, Take 1 tablet (10 mg total) by mouth daily as needed for Allergies., Disp: 30 tablet, Rfl: 1    olopatadine (PATADAY) 0.2 % Drop, Place 1 drop into both eyes once daily. (Patient not taking: Reported on 10/4/2023), Disp: 2.5 mL, Rfl: 0     Review of Systems:  Review of Systems   Constitutional:  Negative for chills and fever.   HENT:  Negative for congestion and sore throat.    Respiratory:  Positive for cough and wheezing. Negative for chest tightness and shortness of breath.    Cardiovascular:  Positive for leg swelling. Negative for chest pain.   Gastrointestinal:  Negative for nausea and vomiting.  "  Musculoskeletal:  Positive for arthralgias.   Neurological:  Negative for dizziness, weakness, light-headedness and headaches.         PHYSICAL EXAM     Vitals:    11/10/23 0940 11/10/23 1001   BP: (!) 178/88 (!) 152/80   Temp: 97.6 °F (36.4 °C)    TempSrc: Oral    Weight: 93.9 kg (207 lb 0.2 oz)    Height: 5' 8" (1.727 m)       Body mass index is 31.48 kg/m².     Physical Exam  Vitals reviewed.   Constitutional:       Appearance: Normal appearance.   HENT:      Head: Normocephalic.      Mouth/Throat:      Mouth: Mucous membranes are moist.      Pharynx: Oropharynx is clear.   Eyes:      Conjunctiva/sclera: Conjunctivae normal.      Pupils: Pupils are equal, round, and reactive to light.   Cardiovascular:      Rate and Rhythm: Normal rate and regular rhythm.   Pulmonary:      Effort: Pulmonary effort is normal.      Breath sounds: Normal breath sounds. No wheezing or rhonchi.   Musculoskeletal:      Comments: Very minimal swelling noted to left lateral ankle. Otherwise no edema noted.   Negative Justin's bilaterall.    Skin:     General: Skin is warm and dry.   Neurological:      General: No focal deficit present.      Mental Status: She is alert.   Psychiatric:         Mood and Affect: Mood normal.         Behavior: Behavior normal.         LABS     Lab Results   Component Value Date    HGBA1C 6.4 (H) 09/08/2023     CMP  Sodium   Date Value Ref Range Status   09/21/2023 141 136 - 145 mmol/L Final     Potassium   Date Value Ref Range Status   09/21/2023 4.2 3.5 - 5.1 mmol/L Final     Comment:     *Result may be interfered by visible hemolysis     Chloride   Date Value Ref Range Status   09/21/2023 102 95 - 110 mmol/L Final     CO2   Date Value Ref Range Status   09/21/2023 28 23 - 29 mmol/L Final     Glucose   Date Value Ref Range Status   09/21/2023 115 (H) 70 - 110 mg/dL Final     BUN   Date Value Ref Range Status   09/21/2023 11 8 - 23 mg/dL Final     Creatinine   Date Value Ref Range Status   09/21/2023 1.2 0.5 " - 1.4 mg/dL Final     Calcium   Date Value Ref Range Status   09/21/2023 9.6 8.7 - 10.5 mg/dL Final     Total Protein   Date Value Ref Range Status   09/21/2023 8.4 6.0 - 8.4 g/dL Final     Comment:     *Result may be interfered by visible hemolysis     Albumin   Date Value Ref Range Status   09/21/2023 3.0 (L) 3.5 - 5.2 g/dL Final     Total Bilirubin   Date Value Ref Range Status   09/21/2023 0.2 0.1 - 1.0 mg/dL Final     Comment:     For infants and newborns, interpretation of results should be based  on gestational age, weight and in agreement with clinical  observations.    Premature Infant recommended reference ranges:  Up to 24 hours.............<8.0 mg/dL  Up to 48 hours............<12.0 mg/dL  3-5 days..................<15.0 mg/dL  6-29 days.................<15.0 mg/dL       Alkaline Phosphatase   Date Value Ref Range Status   09/21/2023 89 55 - 135 U/L Final     AST   Date Value Ref Range Status   09/21/2023 33 10 - 40 U/L Final     Comment:     *Result may be interfered by visible hemolysis     ALT   Date Value Ref Range Status   09/21/2023 23 10 - 44 U/L Final     Anion Gap   Date Value Ref Range Status   09/21/2023 11 8 - 16 mmol/L Final     eGFR if    Date Value Ref Range Status   07/07/2022 48.6 (A) >60 mL/min/1.73 m^2 Final     eGFR if non    Date Value Ref Range Status   07/07/2022 42.2 (A) >60 mL/min/1.73 m^2 Final     Comment:     Calculation used to obtain the estimated glomerular filtration  rate (eGFR) is the CKD-EPI equation.        Lab Results   Component Value Date    WBC 15.17 (H) 09/21/2023    HGB 11.0 (L) 09/21/2023    HCT 33.5 (L) 09/21/2023    MCV 80 (L) 09/21/2023     09/21/2023     Lab Results   Component Value Date    CHOL 156 09/08/2023    CHOL 169 05/19/2021    CHOL 175 02/20/2020     Lab Results   Component Value Date    HDL 40 09/08/2023    HDL 45 05/19/2021    HDL 52 02/20/2020     Lab Results   Component Value Date    LDLCALC 98.6  09/08/2023    LDLCALC 107.6 05/19/2021    LDLCALC 95.0 02/20/2020     Lab Results   Component Value Date    TRIG 87 09/08/2023    TRIG 82 05/19/2021    TRIG 140 02/20/2020     Lab Results   Component Value Date    CHOLHDL 25.6 09/08/2023    CHOLHDL 26.6 05/19/2021    CHOLHDL 29.7 02/20/2020     Lab Results   Component Value Date    TSH 1.433 04/18/2023       ASSESSMENT/PLAN     1. Essential hypertension  High today. Better on recheck. Asymptomatic. Didn't take her meds this AM. Discussed importance of taking her medications as prescribed. She has a f/u scheduled with pcp in 4 weeks. Can recheck then. Her blood pressure was 102/60 at last visit in October.    2. Pedal edema  Very minimal today. Discussed could be SE of amlodipine. But for now want her to continue her meds, elevate legs. I spoke with Dr. Arriaag about this. Discussed that if no improvement but next week. She can call the office and Dr. Arriaga or Margaret can see her per Dr. Arriaga. Also discussed importance of bringing her med bottles with her. Low salt diet.    3. Postnasal drip  -     loratadine (CLARITIN) 10 mg tablet; Take 1 tablet (10 mg total) by mouth daily as needed for Allergies.  Dispense: 30 tablet; Refill: 1    4. Bronchitis  She sounds clear currently. No worrisome symptoms. Given duration and recent PNA, start doxy. Albuterol prn wheezing. F/u with pcp or Margaret next week if no improvement. ER for any worsening  -     benzonatate (TESSALON) 100 MG capsule; Take 1 capsule (100 mg total) by mouth 3 (three) times daily as needed for Cough.  Dispense: 30 capsule; Refill: 0  -     doxycycline (VIBRAMYCIN) 100 MG Cap; Take 1 capsule (100 mg total) by mouth every 12 (twelve) hours.  Dispense: 14 capsule; Refill: 0    5. Type 2 diabetes mellitus without complication, with long-term current use of insulin  Refilled trulicity. Recent A1c acceptable. No recent low. Continue current regimen for now. Keep f/u with STERLING Bernard and Dr. Arriaga  as scheduled.   -     dulaglutide (TRULICITY) 0.75 mg/0.5 mL pen injector; Inject 0.75 mg into the skin every 7 days.  Dispense: 4 pen ; Refill: 2    Elevate legs. Take amlodipine at night.   Stay away from salt.   Use albuterol as needed for wheezing. Antibiotic was sent as well.   If no improvement by next week, please call and Margaret or Dr. Arriaga can see you. Please bring all of your medication bottles with you.   Please follow up sooner for any new or worsening associated symptoms with cough.   Otherwise please keep your follow up with Dr. Arriaga as scheduled and bring your medicine bottles.     Follow up with PCP     Patient was counseled on when to seek emergent care. Patient's plan/treatment was discussed including medications and possible side effects. Verbalized understanding of all instructions.          ANGELIQUE Guo  Department of Internal Medicine - [facility]  11/10/2023

## 2023-11-10 NOTE — PATIENT INSTRUCTIONS
Elevate legs. Take amlodipine at night.   Stay away from salt.   Use albuterol as needed for wheezing. Antibiotic was sent as well.   If no improvement by next week, please call and Margaret or Dr. Arriaga can see you. Please bring all of your medication bottles with you.   Please follow up sooner for any new or worsening associated symptoms with cough.   Otherwise please keep your follow up with Dr. Arriaga as scheduled and bring your medicine bottles.

## 2023-11-10 NOTE — PROGRESS NOTES
CHW - Outreach Attempt    Community Health Worker left a voicemail message for 1st attempt to contact patient regarding: community resources    Community Health Worker to attempt to contact patient on: 11/10/23

## 2023-11-13 ENCOUNTER — NURSE TRIAGE (OUTPATIENT)
Dept: ADMINISTRATIVE | Facility: CLINIC | Age: 83
End: 2023-11-13
Payer: MEDICARE

## 2023-11-13 ENCOUNTER — TELEPHONE (OUTPATIENT)
Dept: INTERNAL MEDICINE | Facility: CLINIC | Age: 83
End: 2023-11-13
Payer: MEDICARE

## 2023-11-13 NOTE — TELEPHONE ENCOUNTER
----- Message from Kailyn Bills sent at 11/13/2023  1:01 PM CST -----  Contact: [phone]  [email or web address]     Patient is calling for Medical Advice regarding:hard to breath and weak and coughing with the inhaler     How long has patient had these symptoms:    Pharmacy name and phone#:    Would like response via [place]:  no     Comments:  Pt states she was told to call the dr back if she did not feel any better she states she is coughing like crazy please give return call

## 2023-11-14 NOTE — PROGRESS NOTES
CHW - Outreach Attempt    Community Health Worker left a voicemail message for 2nd attempt to contact patient regarding: community resources    Community Health Worker to attempt to contact patient on: 11/14/23

## 2023-11-16 NOTE — PROGRESS NOTES
CHW - Case Closure    This Community Health Worker spoke to patient via telephone today.     Pt/Caregiver reported: pt stated she doesn't need any community resources at this time    Pt/Caregiver denied any additional needs at this time and agrees with episode closure at this time.  Provided patient with Community Health Worker's contact information and encouraged him/her to contact this Community Health Worker if additional needs arise.

## 2023-12-06 ENCOUNTER — OFFICE VISIT (OUTPATIENT)
Dept: INTERNAL MEDICINE | Facility: CLINIC | Age: 83
End: 2023-12-06
Payer: MEDICARE

## 2023-12-06 ENCOUNTER — LAB VISIT (OUTPATIENT)
Dept: LAB | Facility: HOSPITAL | Age: 83
End: 2023-12-06
Attending: INTERNAL MEDICINE
Payer: MEDICAID

## 2023-12-06 VITALS
OXYGEN SATURATION: 100 % | WEIGHT: 207 LBS | HEIGHT: 68 IN | BODY MASS INDEX: 31.37 KG/M2 | DIASTOLIC BLOOD PRESSURE: 74 MMHG | HEART RATE: 73 BPM | SYSTOLIC BLOOD PRESSURE: 120 MMHG

## 2023-12-06 DIAGNOSIS — Z00.00 ROUTINE GENERAL MEDICAL EXAMINATION AT A HEALTH CARE FACILITY: Primary | ICD-10-CM

## 2023-12-06 DIAGNOSIS — Z23 NEED FOR VACCINATION AGAINST STREPTOCOCCUS PNEUMONIAE: ICD-10-CM

## 2023-12-06 DIAGNOSIS — Z79.4 TYPE 2 DIABETES MELLITUS WITHOUT COMPLICATION, WITH LONG-TERM CURRENT USE OF INSULIN: ICD-10-CM

## 2023-12-06 DIAGNOSIS — E11.9 TYPE 2 DIABETES MELLITUS WITHOUT COMPLICATION, WITH LONG-TERM CURRENT USE OF INSULIN: ICD-10-CM

## 2023-12-06 DIAGNOSIS — J40 BRONCHITIS: ICD-10-CM

## 2023-12-06 LAB
ALBUMIN SERPL BCP-MCNC: 3.3 G/DL (ref 3.5–5.2)
ALP SERPL-CCNC: 87 U/L (ref 55–135)
ALT SERPL W/O P-5'-P-CCNC: 9 U/L (ref 10–44)
ANION GAP SERPL CALC-SCNC: 12 MMOL/L (ref 8–16)
AST SERPL-CCNC: 19 U/L (ref 10–40)
BASOPHILS # BLD AUTO: 0.07 K/UL (ref 0–0.2)
BASOPHILS NFR BLD: 0.7 % (ref 0–1.9)
BILIRUB SERPL-MCNC: 0.5 MG/DL (ref 0.1–1)
BUN SERPL-MCNC: 14 MG/DL (ref 8–23)
CALCIUM SERPL-MCNC: 9.1 MG/DL (ref 8.7–10.5)
CHLORIDE SERPL-SCNC: 105 MMOL/L (ref 95–110)
CO2 SERPL-SCNC: 30 MMOL/L (ref 23–29)
CREAT SERPL-MCNC: 1.2 MG/DL (ref 0.5–1.4)
DIFFERENTIAL METHOD: ABNORMAL
EOSINOPHIL # BLD AUTO: 0.3 K/UL (ref 0–0.5)
EOSINOPHIL NFR BLD: 2.7 % (ref 0–8)
ERYTHROCYTE [DISTWIDTH] IN BLOOD BY AUTOMATED COUNT: 15 % (ref 11.5–14.5)
EST. GFR  (NO RACE VARIABLE): 44.9 ML/MIN/1.73 M^2
ESTIMATED AVG GLUCOSE: 148 MG/DL (ref 68–131)
GLUCOSE SERPL-MCNC: 107 MG/DL (ref 70–110)
HBA1C MFR BLD: 6.8 % (ref 4–5.6)
HCT VFR BLD AUTO: 34.8 % (ref 37–48.5)
HGB BLD-MCNC: 11.2 G/DL (ref 12–16)
IMM GRANULOCYTES # BLD AUTO: 0.05 K/UL (ref 0–0.04)
IMM GRANULOCYTES NFR BLD AUTO: 0.5 % (ref 0–0.5)
LYMPHOCYTES # BLD AUTO: 2.7 K/UL (ref 1–4.8)
LYMPHOCYTES NFR BLD: 26.3 % (ref 18–48)
MCH RBC QN AUTO: 26 PG (ref 27–31)
MCHC RBC AUTO-ENTMCNC: 32.2 G/DL (ref 32–36)
MCV RBC AUTO: 81 FL (ref 82–98)
MONOCYTES # BLD AUTO: 1.1 K/UL (ref 0.3–1)
MONOCYTES NFR BLD: 10.5 % (ref 4–15)
NEUTROPHILS # BLD AUTO: 6 K/UL (ref 1.8–7.7)
NEUTROPHILS NFR BLD: 59.3 % (ref 38–73)
NRBC BLD-RTO: 0 /100 WBC
PLATELET # BLD AUTO: 273 K/UL (ref 150–450)
PMV BLD AUTO: 10 FL (ref 9.2–12.9)
POTASSIUM SERPL-SCNC: 3.5 MMOL/L (ref 3.5–5.1)
PROT SERPL-MCNC: 7.9 G/DL (ref 6–8.4)
RBC # BLD AUTO: 4.3 M/UL (ref 4–5.4)
SODIUM SERPL-SCNC: 147 MMOL/L (ref 136–145)
WBC # BLD AUTO: 10.1 K/UL (ref 3.9–12.7)

## 2023-12-06 PROCEDURE — G0009 PNEUMOCOCCAL CONJUGATE VACCINE 20-VALENT: ICD-10-PCS | Mod: S$GLB,,, | Performed by: INTERNAL MEDICINE

## 2023-12-06 PROCEDURE — 1125F AMNT PAIN NOTED PAIN PRSNT: CPT | Mod: CPTII,S$GLB,, | Performed by: INTERNAL MEDICINE

## 2023-12-06 PROCEDURE — 90677 PNEUMOCOCCAL CONJUGATE VACCINE 20-VALENT: ICD-10-PCS | Mod: S$GLB,,, | Performed by: INTERNAL MEDICINE

## 2023-12-06 PROCEDURE — 99999 PR PBB SHADOW E&M-EST. PATIENT-LVL V: ICD-10-PCS | Mod: PBBFAC,,, | Performed by: INTERNAL MEDICINE

## 2023-12-06 PROCEDURE — 1159F PR MEDICATION LIST DOCUMENTED IN MEDICAL RECORD: ICD-10-PCS | Mod: CPTII,S$GLB,, | Performed by: INTERNAL MEDICINE

## 2023-12-06 PROCEDURE — 3078F DIAST BP <80 MM HG: CPT | Mod: CPTII,S$GLB,, | Performed by: INTERNAL MEDICINE

## 2023-12-06 PROCEDURE — 3288F PR FALLS RISK ASSESSMENT DOCUMENTED: ICD-10-PCS | Mod: CPTII,S$GLB,, | Performed by: INTERNAL MEDICINE

## 2023-12-06 PROCEDURE — 1101F PT FALLS ASSESS-DOCD LE1/YR: CPT | Mod: CPTII,S$GLB,, | Performed by: INTERNAL MEDICINE

## 2023-12-06 PROCEDURE — G0009 ADMIN PNEUMOCOCCAL VACCINE: HCPCS | Mod: S$GLB,,, | Performed by: INTERNAL MEDICINE

## 2023-12-06 PROCEDURE — 99397 PER PM REEVAL EST PAT 65+ YR: CPT | Mod: GZ,S$GLB,, | Performed by: INTERNAL MEDICINE

## 2023-12-06 PROCEDURE — 1159F MED LIST DOCD IN RCRD: CPT | Mod: CPTII,S$GLB,, | Performed by: INTERNAL MEDICINE

## 2023-12-06 PROCEDURE — 90677 PCV20 VACCINE IM: CPT | Mod: S$GLB,,, | Performed by: INTERNAL MEDICINE

## 2023-12-06 PROCEDURE — 3074F SYST BP LT 130 MM HG: CPT | Mod: CPTII,S$GLB,, | Performed by: INTERNAL MEDICINE

## 2023-12-06 PROCEDURE — 3074F PR MOST RECENT SYSTOLIC BLOOD PRESSURE < 130 MM HG: ICD-10-PCS | Mod: CPTII,S$GLB,, | Performed by: INTERNAL MEDICINE

## 2023-12-06 PROCEDURE — 85025 COMPLETE CBC W/AUTO DIFF WBC: CPT | Performed by: INTERNAL MEDICINE

## 2023-12-06 PROCEDURE — 1101F PR PT FALLS ASSESS DOC 0-1 FALLS W/OUT INJ PAST YR: ICD-10-PCS | Mod: CPTII,S$GLB,, | Performed by: INTERNAL MEDICINE

## 2023-12-06 PROCEDURE — 3288F FALL RISK ASSESSMENT DOCD: CPT | Mod: CPTII,S$GLB,, | Performed by: INTERNAL MEDICINE

## 2023-12-06 PROCEDURE — 99999 PR PBB SHADOW E&M-EST. PATIENT-LVL V: CPT | Mod: PBBFAC,,, | Performed by: INTERNAL MEDICINE

## 2023-12-06 PROCEDURE — 99397 PR PREVENTIVE VISIT,EST,65 & OVER: ICD-10-PCS | Mod: GZ,S$GLB,, | Performed by: INTERNAL MEDICINE

## 2023-12-06 PROCEDURE — 80053 COMPREHEN METABOLIC PANEL: CPT | Performed by: INTERNAL MEDICINE

## 2023-12-06 PROCEDURE — 83036 HEMOGLOBIN GLYCOSYLATED A1C: CPT | Performed by: INTERNAL MEDICINE

## 2023-12-06 PROCEDURE — 1125F PR PAIN SEVERITY QUANTIFIED, PAIN PRESENT: ICD-10-PCS | Mod: CPTII,S$GLB,, | Performed by: INTERNAL MEDICINE

## 2023-12-06 PROCEDURE — 3078F PR MOST RECENT DIASTOLIC BLOOD PRESSURE < 80 MM HG: ICD-10-PCS | Mod: CPTII,S$GLB,, | Performed by: INTERNAL MEDICINE

## 2023-12-06 PROCEDURE — 36415 COLL VENOUS BLD VENIPUNCTURE: CPT | Performed by: INTERNAL MEDICINE

## 2023-12-06 RX ORDER — OXYCODONE AND ACETAMINOPHEN 10; 325 MG/1; MG/1
1 TABLET ORAL
Qty: 150 TABLET | Refills: 0 | Status: SHIPPED | OUTPATIENT
Start: 2023-12-06 | End: 2024-01-03 | Stop reason: SDUPTHER

## 2023-12-06 RX ORDER — BENZONATATE 100 MG/1
100 CAPSULE ORAL 3 TIMES DAILY PRN
Qty: 30 CAPSULE | Refills: 0 | Status: SHIPPED | OUTPATIENT
Start: 2023-12-06

## 2023-12-06 RX ORDER — DULAGLUTIDE 0.75 MG/.5ML
0.75 INJECTION, SOLUTION SUBCUTANEOUS
Qty: 4 PEN | Refills: 2 | Status: SHIPPED | OUTPATIENT
Start: 2023-12-06 | End: 2024-03-04 | Stop reason: SDUPTHER

## 2023-12-06 NOTE — PROGRESS NOTES
CHIEF COMPLAINT:  Annual exam and  Follow up of multple problems.      HISTORY OF PRESENT ILLNESS: This is a 83-year-old woman who presents for follow up of above    SHe has an intermittent cough. Overall she is breathing fine. No mucous production      She is taking Trulicity 0.75 m g once a week on a Thursday. She is taking lantus 30 units nighlty, and novolog 10 units with eating. Blood sugars are around 120.  No nausea or vomiting     No cough, fever, chills, sinus congestion, sore throat. She contineus to have some shortness of breath.      She has some of her pill bottles with her today. She has the correct pill bottles. She does not have the insulins or trulicity with her. She is out of oxycodone. She is due for a refill of the oxycodone.      She continues to have itching on her arms and neck. She used lidocaine patch which helped a little.      She continues to have neck and shoulder pain, back pain. She complains of back pain and body pain  which has been severe. She is taking oxycodone apap 10/325 up to 5 times daily.   She continues to take duloxetine 60 mg twice daily.  Her mood is doing ok.        She saw Dr Reilly on 1/18/22 - she continues to take Eliquis 5 mg wice daily.  She had cardiac PET stress 2/9/2022 which was negative for ischemia          No fever or chills. She has felt cold.  No nausea, vomiting, constipation, dysuria, hematuira.  She denies swallowing problems today.        She is living alone. She does not see her grandson very much. She has a niece, Kacie Mast who lives locally.      She is taking  coreg 25 mg twice daily for her hypertension and diastolic dysfunction.     She is taking requipo 0.5  mg at bedtime - for helping the restless leg     She had a right ureteroscopy with laser lithotripsy and stone basketing on 5/12/23.  Her pain from the stone has resolved.  She still has a urinary stent.         PAST MEDICAL HISTORY:   1. Diabetes mellitus   2. Hypertension   3.  "Hyperlipidemia   4. Left heart catheterization January 2007 which revealed luminal irregularities in the LAD, left circumflex and right coronary artery. She had diastolic dysfunction and patent renal arteries.   5. Sleep apnea   6. Obesity.   7. Nephrolithiasis status post lithotripsy.   8. Reflux - had nonerosive gastropathy on EGD September 2007. Gastritis on EGD 9/2010  9. Hidradenitis suppurativa.   10. History of diverticulitis with a hospitalized October 2007.   11. Migraines   12. Obesity.   13. 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    14. Total hysterectomy in 1969.   15. Cholecystectomy was done at that time as well.   16. 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   17. Colon polyps on colonscopy 11/2010 - due 2013   18. Hospitalization 12/10 for e coli sepsis due to left ureteral stone with left nephrostomy tube in [place], [place]   19. Left knee replacement 9/2012      MEDICATIONS and ALLERGIES: Updated on EPIC.     PHYSICAL EXAMINATION:         /74 (BP Location: Right arm, Patient Position: Sitting, BP Method: Large (Manual))   Pulse 73   Ht 5' 8" (1.727 m)   Wt 93.9 kg (207 lb)   LMP  (LMP Unknown)   SpO2 100%   BMI 31.47 kg/m²      GENERAL: She is alert, oriented, no apparent distress. Affect within   normal limits.  Conjunctiva anicteric.    NECK: Supple.   Respiratory: Effort normal. Lungs clear  HEART: Regular rate and rhythm without murmurs, gallops or rubs.   No lower extremity edema.  .            ASSESSMENT AND PLAN:     Annual exam - discussed diet, exercise and safety issues.       1. Diabetes - doing better - will not make any changes at this point in time.  Continue to eat regulary. Ideally would like to titrate Trulicity and lower insulin but due to memory issues, afraid to make changes in regime.   2. Kidney stones - asx  3. Hypertension with diastolic heart failure- stable. Negative PET stress.  " stable on carvedilol 25 mg one tablet twice daily.   4.  OA neck and lumbar spine - on oxycodone. stable  5.  Gout - asx off allopurinol  6.  Mood disorder -  on duloxetine 60 mg twice daily  7.  Swallowing disorder - s/p EGD with dilation on 11/11/21.   8. Asthma - stable   9. Gerd -get on pantoprazole 40 mg  daily   10. Hyperlipidemia - off  lipitor  11. Allergic rhinitis - stable  12. CRI -stable  13. Obesity - discussed diet, exercise and weight loss  14. CAD -risk factor modification  15. Aortic atherosclerosis and possible mesenteric artery stenosis- risk factor modification  16. Tortuous aorta - HTN controlled  17. Colon polyps - Colonoscopy in 8/2013 - 2 polyps. Flex sig 11/2013 - mild granular mucosa. Colonoscopy 6/2017 diverticulosis - no polyps due 2023.   18. hyperparathryodisism -saw nephrology   19. .Restless leg syndrome - get on roprinole       .  MMG 7/22        I will see her back in 1 month sooner if problems arise

## 2023-12-11 ENCOUNTER — TELEPHONE (OUTPATIENT)
Dept: INTERNAL MEDICINE | Facility: CLINIC | Age: 83
End: 2023-12-11
Payer: MEDICARE

## 2023-12-11 NOTE — TELEPHONE ENCOUNTER
----- Message from Jeannine Arriaga MD sent at 12/10/2023 10:53 AM CST -----  Please notify pt   Blood work is fine.   Your blood work from last week revealed that blood count, blood sugar, kidney function, liver function,thyroid function are stable  Jeannine Arriaga M.D.

## 2024-01-03 ENCOUNTER — OFFICE VISIT (OUTPATIENT)
Dept: INTERNAL MEDICINE | Facility: CLINIC | Age: 84
End: 2024-01-03
Payer: MEDICARE

## 2024-01-03 VITALS
HEIGHT: 68 IN | DIASTOLIC BLOOD PRESSURE: 80 MMHG | BODY MASS INDEX: 30.96 KG/M2 | SYSTOLIC BLOOD PRESSURE: 148 MMHG | HEART RATE: 94 BPM | WEIGHT: 204.25 LBS | OXYGEN SATURATION: 96 %

## 2024-01-03 DIAGNOSIS — Z79.4 TYPE 2 DIABETES MELLITUS WITHOUT COMPLICATION, WITH LONG-TERM CURRENT USE OF INSULIN: ICD-10-CM

## 2024-01-03 DIAGNOSIS — E11.9 TYPE 2 DIABETES MELLITUS WITHOUT COMPLICATION, WITH LONG-TERM CURRENT USE OF INSULIN: ICD-10-CM

## 2024-01-03 DIAGNOSIS — E11.649 HYPOGLYCEMIA ASSOCIATED WITH DIABETES: ICD-10-CM

## 2024-01-03 DIAGNOSIS — N18.31 STAGE 3A CHRONIC KIDNEY DISEASE: ICD-10-CM

## 2024-01-03 DIAGNOSIS — E78.2 MIXED HYPERLIPIDEMIA: ICD-10-CM

## 2024-01-03 DIAGNOSIS — Z86.718 HISTORY OF DVT (DEEP VEIN THROMBOSIS): ICD-10-CM

## 2024-01-03 DIAGNOSIS — G89.4 CHRONIC PAIN DISORDER: ICD-10-CM

## 2024-01-03 DIAGNOSIS — F39 MOOD DISORDER: ICD-10-CM

## 2024-01-03 DIAGNOSIS — I10 ESSENTIAL HYPERTENSION: Chronic | ICD-10-CM

## 2024-01-03 DIAGNOSIS — G25.81 RLS (RESTLESS LEGS SYNDROME): Primary | ICD-10-CM

## 2024-01-03 PROBLEM — E78.00 HYPERCHOLESTEROLEMIA: Status: RESOLVED | Noted: 2018-11-29 | Resolved: 2024-01-03

## 2024-01-03 PROCEDURE — 1126F AMNT PAIN NOTED NONE PRSNT: CPT | Mod: HCNC,CPTII,S$GLB, | Performed by: PHYSICIAN ASSISTANT

## 2024-01-03 PROCEDURE — 3077F SYST BP >= 140 MM HG: CPT | Mod: HCNC,CPTII,S$GLB, | Performed by: PHYSICIAN ASSISTANT

## 2024-01-03 PROCEDURE — 99214 OFFICE O/P EST MOD 30 MIN: CPT | Mod: HCNC,S$GLB,, | Performed by: PHYSICIAN ASSISTANT

## 2024-01-03 PROCEDURE — 1159F MED LIST DOCD IN RCRD: CPT | Mod: HCNC,CPTII,S$GLB, | Performed by: PHYSICIAN ASSISTANT

## 2024-01-03 PROCEDURE — 1101F PT FALLS ASSESS-DOCD LE1/YR: CPT | Mod: HCNC,CPTII,S$GLB, | Performed by: PHYSICIAN ASSISTANT

## 2024-01-03 PROCEDURE — 3072F LOW RISK FOR RETINOPATHY: CPT | Mod: HCNC,CPTII,S$GLB, | Performed by: PHYSICIAN ASSISTANT

## 2024-01-03 PROCEDURE — 99215 OFFICE O/P EST HI 40 MIN: CPT | Mod: PBBFAC | Performed by: PHYSICIAN ASSISTANT

## 2024-01-03 PROCEDURE — 1160F RVW MEDS BY RX/DR IN RCRD: CPT | Mod: HCNC,CPTII,S$GLB, | Performed by: PHYSICIAN ASSISTANT

## 2024-01-03 PROCEDURE — 3288F FALL RISK ASSESSMENT DOCD: CPT | Mod: HCNC,CPTII,S$GLB, | Performed by: PHYSICIAN ASSISTANT

## 2024-01-03 PROCEDURE — 3079F DIAST BP 80-89 MM HG: CPT | Mod: HCNC,CPTII,S$GLB, | Performed by: PHYSICIAN ASSISTANT

## 2024-01-03 PROCEDURE — 99999 PR PBB SHADOW E&M-EST. PATIENT-LVL V: CPT | Mod: PBBFAC,HCNC,, | Performed by: PHYSICIAN ASSISTANT

## 2024-01-03 RX ORDER — OXYCODONE AND ACETAMINOPHEN 10; 325 MG/1; MG/1
1 TABLET ORAL
Qty: 150 TABLET | Refills: 0 | Status: SHIPPED | OUTPATIENT
Start: 2024-01-03 | End: 2024-01-31 | Stop reason: SDUPTHER

## 2024-01-03 RX ORDER — ROPINIROLE 0.5 MG/1
0.5 TABLET, FILM COATED ORAL NIGHTLY
Qty: 90 TABLET | Refills: 3 | Status: SHIPPED | OUTPATIENT
Start: 2024-01-03

## 2024-01-03 RX ORDER — AMLODIPINE BESYLATE 10 MG/1
10 TABLET ORAL DAILY
Qty: 90 TABLET | Refills: 0 | Status: SHIPPED | OUTPATIENT
Start: 2024-01-03 | End: 2025-01-02

## 2024-01-03 NOTE — PROGRESS NOTES
Subjective:       Patient ID: Henrietta Villasenor is a 83 y.o. female.        Chief Complaint: Follow-up    HISTORY OF PRESENT ILLNESS: This is a 83-year-old woman who presents for follow up of above     DM -   Trulicity weekly  Lantus 30 U  Novolog 10 U with meals  , mostly around 120-130 range    Lab Results       Component                Value               Date                       HGBA1C                   6.8 (H)             12/06/2023                 HGBA1C                   6.4 (H)             09/08/2023                 HGBA1C                   8.2 (H)             03/08/2023              HTN -   Carvedilol 25 mg BID  Amlodipine 10 mg daily (needs refill)    BP Readings from Last 5 Encounters:  01/03/24 : (!) 170/90  12/06/23 : 120/74  11/10/23 : (!) 152/80  10/04/23 : 102/60  10/01/23 : 127/66     Breathing is doing well, she has an intermittent cough    Chronic pain and due for oxycodone refill from PCP, also on cymbalta 60 mg BID    RLS tx with requip and needs refill    She has all pill bottles with her today, only missing her inuslin and trulicity, which she notes she has at home     She had a right ureteroscopy with laser lithotripsy and stone basketing on 5/12/23           Review of Systems   Constitutional:  Negative for chills, diaphoresis, fatigue and fever.   HENT:  Negative for congestion and sore throat.    Eyes:  Negative for visual disturbance.   Respiratory:  Negative for cough, chest tightness and shortness of breath.    Cardiovascular:  Negative for chest pain, palpitations and leg swelling.   Gastrointestinal:  Negative for abdominal pain, blood in stool, constipation, diarrhea, nausea and vomiting.   Genitourinary:  Negative for dysuria, frequency, hematuria and urgency.   Musculoskeletal:  Negative for arthralgias and back pain.   Skin:  Negative for rash.   Neurological:  Negative for dizziness, syncope, weakness and headaches.   Psychiatric/Behavioral:  Negative for dysphoric mood and  sleep disturbance. The patient is not nervous/anxious.        Objective:      Physical Exam  Vitals and nursing note reviewed.   Constitutional:       Appearance: Normal appearance. She is well-developed.   HENT:      Head: Normocephalic.      Right Ear: External ear normal.      Left Ear: External ear normal.   Eyes:      Pupils: Pupils are equal, round, and reactive to light.   Cardiovascular:      Rate and Rhythm: Normal rate and regular rhythm.      Heart sounds: Normal heart sounds. No murmur heard.     No friction rub. No gallop.   Pulmonary:      Effort: Pulmonary effort is normal. No respiratory distress.      Breath sounds: Normal breath sounds.   Abdominal:      Palpations: Abdomen is soft.      Tenderness: There is no abdominal tenderness.   Skin:     General: Skin is warm and dry.   Neurological:      Mental Status: She is alert.         Assessment:       1. RLS (restless legs syndrome)    2. Essential hypertension    3. Mood disorder    4. Chronic pain disorder    5. Mixed hyperlipidemia    6. Stage 3a chronic kidney disease    7. History of DVT (deep vein thrombosis)    8. Type 2 diabetes mellitus without complication, with long-term current use of insulin    9. Hypoglycemia associated with diabetes        Plan:       Henrietta was seen today for follow-up.    Diagnoses and all orders for this visit:    RLS (restless legs syndrome)  -     REFILL: rOPINIRole (REQUIP) 0.5 MG tablet; Take 1 tablet (0.5 mg total) by mouth every evening.    Essential hypertension  -     REFILL: amLODIPine (NORVASC) 10 MG tablet; Take 1 tablet (10 mg total) by mouth once daily.    Mood disorder - stable and controlled, continue current regimen    Chronic pain disorder - stable and controlled, continue current regimen, med refill request sent to PCP    Mixed hyperlipidemia - stable and controlled, continue current regimen  Lab Results   Component Value Date    CHOL 156 09/08/2023    TRIG 87 09/08/2023    HDL 40 09/08/2023     "LDLCALC 98.6 09/08/2023     Stage 3a chronic kidney disease    History of DVT (deep vein thrombosis)    Type 2 diabetes mellitus without complication, with long-term current use of insulin - no hypoglycemia, stable and controlled, continue current regimen  Lab Results   Component Value Date    HGBA1C 6.8 (H) 12/06/2023    HGBA1C 6.4 (H) 09/08/2023    HGBA1C 8.2 (H) 03/08/2023     Hypoglycemia associated with diabetes - none recently, eating regularly, monitoring insulin    RSV vaccine today    Pt has been given instructions populated from patient instructions database and has verbalized understanding of the after visit summary and information contained wherein.    Follow up with a primary care provider. May go to ER for acute shortness of breath, lightheadedness, fever, or any other emergent complaints or changes in condition.    "This note will be shared with the patient"    Future Appointments   Date Time Provider Department Center   2/7/2024 11:30 AM Jeannine Arriaga MD [facility]   3/6/2024 11:30 AM [facility]   4/3/2024 11:30 AM Jeannine Arriaga MD [facility]                 "

## 2024-01-19 DIAGNOSIS — E11.42 DIABETIC POLYNEUROPATHY ASSOCIATED WITH TYPE 2 DIABETES MELLITUS: ICD-10-CM

## 2024-01-19 RX ORDER — ATORVASTATIN CALCIUM 40 MG/1
40 TABLET, FILM COATED ORAL DAILY
Qty: 90 TABLET | Refills: 1 | Status: SHIPPED | OUTPATIENT
Start: 2024-01-19 | End: 2024-06-14 | Stop reason: SDUPTHER

## 2024-01-19 RX ORDER — DULOXETIN HYDROCHLORIDE 60 MG/1
60 CAPSULE, DELAYED RELEASE ORAL 2 TIMES DAILY
Qty: 180 CAPSULE | Refills: 1 | Status: SHIPPED | OUTPATIENT
Start: 2024-01-19

## 2024-01-19 NOTE — TELEPHONE ENCOUNTER
Returned pt call, pt requesting med refill. Pended for your review. Last prescribed by Dr Shook while she was hospitalized.     LOV with Jeannine Arriaga MD , 12/6/2023

## 2024-01-19 NOTE — TELEPHONE ENCOUNTER
----- Message from Kailyn Bills sent at 1/19/2024  2:16 PM CST -----  Contact: [phone]  [email or web address]     Patient is calling for Medical Advice regarding:medication that was not all filled     How long has patient had these symptoms:    Pharmacy name and phone#:    Would like response via [employer]:  no     Comments:  Pt is calling she states there is 2 medications that she is needing to be filled she states she is out of them and does not know the name of them please give return call

## 2024-01-19 NOTE — TELEPHONE ENCOUNTER
No care due was identified.  [facility] Embedded Care Due Messages. Reference number: [id number].   1/19/2024 3:20:47 PM CST

## 2024-01-19 NOTE — TELEPHONE ENCOUNTER
No care due was identified.  [facility] Embedded Care Due Messages. Reference number: [id number].   1/19/2024 4:33:29 PM CST

## 2024-01-25 ENCOUNTER — PATIENT OUTREACH (OUTPATIENT)
Dept: ADMINISTRATIVE | Facility: HOSPITAL | Age: 84
End: 2024-01-25
Payer: MEDICARE

## 2024-01-25 NOTE — PROGRESS NOTES

## 2024-01-26 ENCOUNTER — NURSE TRIAGE (OUTPATIENT)
Dept: ADMINISTRATIVE | Facility: CLINIC | Age: 84
End: 2024-01-26
Payer: MEDICARE

## 2024-01-26 NOTE — TELEPHONE ENCOUNTER
Transferred from patient access, coughing, hard to understand, sounds SOB. Patient states she is SOB and has a bad cough. Very phlegmy cough noted. Triage done- dispo 911. Patient states she does not feel that bad, just wanted an appointment for today to get RX for cough. Stressed to patient that I understood but she was audible SOB. Advised I would send message but the care advice was 911, she is alone at home. Verb understanding.   Reason for Disposition   SEVERE difficulty breathing (e.g., struggling for each breath, speaks in single words, pulse > 120)    Protocols used: Breathing Difficulty-A-[place]

## 2024-01-30 ENCOUNTER — NURSE TRIAGE (OUTPATIENT)
Dept: ADMINISTRATIVE | Facility: CLINIC | Age: 84
End: 2024-01-30
Payer: MEDICARE

## 2024-01-30 NOTE — TELEPHONE ENCOUNTER
Spoke with patient.   Her legs are swelling - comes and goes.     She is having diarrhea with black stools    She denies chest pain    She is having neck pain.     She has been eating too much - blood sugars have been 84 and 87.    She has been out of Eliquis for the last week.    Will see pt tomorrow.  IF she worsens, she is to call 911 to go to the ED

## 2024-01-30 NOTE — TELEPHONE ENCOUNTER
Pt frustrated and trying to get appt made to see a doctor. States every time she calls she gets transferred to nurse line. Pt has cold symptoms for going on 2 weeks. Pt sometimes having SOB. Also having diarrhea with black stools. Also stiff legs due to swelling making it difficult for her to stand up. Pt also reporting coughing and wheezing. She is also out of her eliquis for a week now and requesting refill. Difficult to direct pt due to her frustration with not being able to simply make an appt and multiple complaints/wide variety of symptoms.     Dispo- go to ED now. Triaged on leg swelling- pt reported that the back of her right leg has turned black recently, which led to ED disp. Advised pt accordingly and expressed to patient that all of the things she is telling me about are things that are concerning and need to be evaluated more immediately and urgently than waiting to be seen in office as she is requesting. Pt verbalizes understanding but unsure if she will follow dispo recommendations. Message routed to providers office requesting they urgently reach out to pt as she will not likely be going to get the evaluation she needs at this time.   Reason for Disposition   Entire foot is cool or blue in comparison to other side    Additional Information   Negative: Sounds like a life-threatening emergency to the triager   Negative: Difficulty breathing at rest    Protocols used: Leg Swelling and Edema-A-[place]

## 2024-01-30 NOTE — TELEPHONE ENCOUNTER
Dr Arriaga please see message below   Pt refused ED   She even called last week and was told to go to the ED as well

## 2024-01-31 ENCOUNTER — OFFICE VISIT (OUTPATIENT)
Dept: INTERNAL MEDICINE | Facility: CLINIC | Age: 84
End: 2024-01-31
Payer: MEDICARE

## 2024-01-31 ENCOUNTER — LAB VISIT (OUTPATIENT)
Dept: LAB | Facility: HOSPITAL | Age: 84
End: 2024-01-31
Attending: INTERNAL MEDICINE
Payer: MEDICARE

## 2024-01-31 VITALS
HEART RATE: 71 BPM | WEIGHT: 200 LBS | OXYGEN SATURATION: 100 % | SYSTOLIC BLOOD PRESSURE: 120 MMHG | BODY MASS INDEX: 30.31 KG/M2 | HEIGHT: 68 IN | DIASTOLIC BLOOD PRESSURE: 80 MMHG

## 2024-01-31 DIAGNOSIS — I70.0 AORTIC ATHEROSCLEROSIS: ICD-10-CM

## 2024-01-31 DIAGNOSIS — N18.31 TYPE 2 DIABETES MELLITUS WITH STAGE 3A CHRONIC KIDNEY DISEASE, WITH LONG-TERM CURRENT USE OF INSULIN: ICD-10-CM

## 2024-01-31 DIAGNOSIS — I25.119 CORONARY ARTERY DISEASE INVOLVING NATIVE CORONARY ARTERY OF NATIVE HEART WITH ANGINA PECTORIS: ICD-10-CM

## 2024-01-31 DIAGNOSIS — Z79.4 TYPE 2 DIABETES MELLITUS WITHOUT COMPLICATION, WITH LONG-TERM CURRENT USE OF INSULIN: ICD-10-CM

## 2024-01-31 DIAGNOSIS — E11.9 TYPE 2 DIABETES MELLITUS WITHOUT COMPLICATION, WITH LONG-TERM CURRENT USE OF INSULIN: ICD-10-CM

## 2024-01-31 DIAGNOSIS — E11.22 TYPE 2 DIABETES MELLITUS WITH STAGE 3A CHRONIC KIDNEY DISEASE, WITH LONG-TERM CURRENT USE OF INSULIN: ICD-10-CM

## 2024-01-31 DIAGNOSIS — M46.06 SPINAL ENTHESOPATHY OF LUMBAR REGION: ICD-10-CM

## 2024-01-31 DIAGNOSIS — I50.32 CHRONIC DIASTOLIC HEART FAILURE: ICD-10-CM

## 2024-01-31 DIAGNOSIS — R05.9 COUGH, UNSPECIFIED TYPE: ICD-10-CM

## 2024-01-31 DIAGNOSIS — E11.9 TYPE 2 DIABETES MELLITUS WITHOUT COMPLICATION, WITH LONG-TERM CURRENT USE OF INSULIN: Primary | ICD-10-CM

## 2024-01-31 DIAGNOSIS — Z79.4 TYPE 2 DIABETES MELLITUS WITH STAGE 3A CHRONIC KIDNEY DISEASE, WITH LONG-TERM CURRENT USE OF INSULIN: ICD-10-CM

## 2024-01-31 DIAGNOSIS — Z79.4 TYPE 2 DIABETES MELLITUS WITHOUT COMPLICATION, WITH LONG-TERM CURRENT USE OF INSULIN: Primary | ICD-10-CM

## 2024-01-31 LAB
ALBUMIN SERPL BCP-MCNC: 3.3 G/DL (ref 3.5–5.2)
ALP SERPL-CCNC: 82 U/L (ref 55–135)
ALT SERPL W/O P-5'-P-CCNC: 10 U/L (ref 10–44)
ANION GAP SERPL CALC-SCNC: 6 MMOL/L (ref 8–16)
AST SERPL-CCNC: 16 U/L (ref 10–40)
BASOPHILS # BLD AUTO: 0.03 K/UL (ref 0–0.2)
BASOPHILS NFR BLD: 0.6 % (ref 0–1.9)
BILIRUB SERPL-MCNC: 0.6 MG/DL (ref 0.1–1)
BUN SERPL-MCNC: 10 MG/DL (ref 8–23)
CALCIUM SERPL-MCNC: 9.4 MG/DL (ref 8.7–10.5)
CHLORIDE SERPL-SCNC: 103 MMOL/L (ref 95–110)
CO2 SERPL-SCNC: 30 MMOL/L (ref 23–29)
CREAT SERPL-MCNC: 1.1 MG/DL (ref 0.5–1.4)
DIFFERENTIAL METHOD BLD: ABNORMAL
EOSINOPHIL # BLD AUTO: 0.1 K/UL (ref 0–0.5)
EOSINOPHIL NFR BLD: 2.4 % (ref 0–8)
ERYTHROCYTE [DISTWIDTH] IN BLOOD BY AUTOMATED COUNT: 15.1 % (ref 11.5–14.5)
EST. GFR  (NO RACE VARIABLE): 49.9 ML/MIN/1.73 M^2
ESTIMATED AVG GLUCOSE: 160 MG/DL (ref 68–131)
GLUCOSE SERPL-MCNC: 118 MG/DL (ref 70–110)
HBA1C MFR BLD: 7.2 % (ref 4–5.6)
HCT VFR BLD AUTO: 37.6 % (ref 37–48.5)
HGB BLD-MCNC: 11.6 G/DL (ref 12–16)
IMM GRANULOCYTES # BLD AUTO: 0.03 K/UL (ref 0–0.04)
IMM GRANULOCYTES NFR BLD AUTO: 0.6 % (ref 0–0.5)
LYMPHOCYTES # BLD AUTO: 1.7 K/UL (ref 1–4.8)
LYMPHOCYTES NFR BLD: 32 % (ref 18–48)
MAGNESIUM SERPL-MCNC: 1.1 MG/DL (ref 1.6–2.6)
MCH RBC QN AUTO: 26.2 PG (ref 27–31)
MCHC RBC AUTO-ENTMCNC: 30.9 G/DL (ref 32–36)
MCV RBC AUTO: 85 FL (ref 82–98)
MONOCYTES # BLD AUTO: 0.8 K/UL (ref 0.3–1)
MONOCYTES NFR BLD: 15.5 % (ref 4–15)
NEUTROPHILS # BLD AUTO: 2.7 K/UL (ref 1.8–7.7)
NEUTROPHILS NFR BLD: 48.9 % (ref 38–73)
NRBC BLD-RTO: 0 /100 WBC
PLATELET # BLD AUTO: 57 K/UL (ref 150–450)
PMV BLD AUTO: 13 FL (ref 9.2–12.9)
POTASSIUM SERPL-SCNC: 3.6 MMOL/L (ref 3.5–5.1)
PROT SERPL-MCNC: 7.9 G/DL (ref 6–8.4)
RBC # BLD AUTO: 4.42 M/UL (ref 4–5.4)
SODIUM SERPL-SCNC: 139 MMOL/L (ref 136–145)
WBC # BLD AUTO: 5.43 K/UL (ref 3.9–12.7)

## 2024-01-31 PROCEDURE — 1101F PT FALLS ASSESS-DOCD LE1/YR: CPT | Mod: HCNC,CPTII,S$GLB, | Performed by: INTERNAL MEDICINE

## 2024-01-31 PROCEDURE — 83036 HEMOGLOBIN GLYCOSYLATED A1C: CPT | Mod: HCNC | Performed by: INTERNAL MEDICINE

## 2024-01-31 PROCEDURE — 1125F AMNT PAIN NOTED PAIN PRSNT: CPT | Mod: HCNC,CPTII,S$GLB, | Performed by: INTERNAL MEDICINE

## 2024-01-31 PROCEDURE — 85025 COMPLETE CBC W/AUTO DIFF WBC: CPT | Mod: HCNC | Performed by: INTERNAL MEDICINE

## 2024-01-31 PROCEDURE — 3288F FALL RISK ASSESSMENT DOCD: CPT | Mod: HCNC,CPTII,S$GLB, | Performed by: INTERNAL MEDICINE

## 2024-01-31 PROCEDURE — 3072F LOW RISK FOR RETINOPATHY: CPT | Mod: HCNC,CPTII,S$GLB, | Performed by: INTERNAL MEDICINE

## 2024-01-31 PROCEDURE — 99999 PR PBB SHADOW E&M-EST. PATIENT-LVL V: CPT | Mod: PBBFAC,HCNC,, | Performed by: INTERNAL MEDICINE

## 2024-01-31 PROCEDURE — 99214 OFFICE O/P EST MOD 30 MIN: CPT | Mod: HCNC,S$GLB,, | Performed by: INTERNAL MEDICINE

## 2024-01-31 PROCEDURE — 3079F DIAST BP 80-89 MM HG: CPT | Mod: HCNC,CPTII,S$GLB, | Performed by: INTERNAL MEDICINE

## 2024-01-31 PROCEDURE — 80053 COMPREHEN METABOLIC PANEL: CPT | Mod: HCNC | Performed by: INTERNAL MEDICINE

## 2024-01-31 PROCEDURE — 1159F MED LIST DOCD IN RCRD: CPT | Mod: HCNC,CPTII,S$GLB, | Performed by: INTERNAL MEDICINE

## 2024-01-31 PROCEDURE — 83735 ASSAY OF MAGNESIUM: CPT | Mod: HCNC | Performed by: INTERNAL MEDICINE

## 2024-01-31 PROCEDURE — 36415 COLL VENOUS BLD VENIPUNCTURE: CPT | Mod: HCNC | Performed by: INTERNAL MEDICINE

## 2024-01-31 PROCEDURE — 3074F SYST BP LT 130 MM HG: CPT | Mod: HCNC,CPTII,S$GLB, | Performed by: INTERNAL MEDICINE

## 2024-01-31 RX ORDER — AMOXICILLIN 500 MG/1
500 CAPSULE ORAL EVERY 12 HOURS
Qty: 20 CAPSULE | Refills: 0 | Status: SHIPPED | OUTPATIENT
Start: 2024-01-31 | End: 2024-03-06

## 2024-01-31 RX ORDER — OXYCODONE AND ACETAMINOPHEN 10; 325 MG/1; MG/1
1 TABLET ORAL
Qty: 150 TABLET | Refills: 0 | Status: SHIPPED | OUTPATIENT
Start: 2024-01-31 | End: 2024-03-04 | Stop reason: SDUPTHER

## 2024-01-31 RX ORDER — FLUCONAZOLE 150 MG/1
150 TABLET ORAL DAILY
Qty: 5 TABLET | Refills: 0 | Status: SHIPPED | OUTPATIENT
Start: 2024-01-31 | End: 2024-02-05

## 2024-01-31 NOTE — PROGRESS NOTES
CHIEF COMPLAINT:   Follow up of multple problems.      HISTORY OF PRESENT ILLNESS: This is a 83-year-old woman who presents for follow up of above    She has been weak and nauseated. No vomiting. She has had a BM - soft stool - dark. No peptobismol.  She has been taking mylanta.     She has had a cough with mucous production the last several weeks.  Mucous is green and coming from the back of her throat.  She has chronic dyspnea on exertion. No fever or chills.        She is taking Trulicity 0.75 m g once a week on a Friday (she was out of it for a week). She is taking lantus 30 units nighlty, and novolog 10 units with eating. Blood sugars are around .        She has her pill bottles with her today.  She has an empty bootle of atorvastatin and Eliquis ( out for the last week). She has the correct pill bottles. She does not have the oxycodone apap, insulins or trulicity with her.      She continues to have itching on her arms and neck.       She continues to have neck and shoulder pain, back pain. She complains of back pain and body pain  which has been severe. She is taking oxycodone apap 10/325 up to 5 times daily.   She continues to take duloxetine 60 mg twice daily.  Her mood is doing ok.        She saw Dr Reilly on 1/18/22 - she continues to take Eliquis 5 mg wice daily.  She had cardiac PET stress 2/9/2022 which was negative for ischemia          No fever or chills. She has felt cold.  No nausea, vomiting, constipation, dysuria, hematuira.  She denies swallowing problems today.        She is living alone. She does not see her grandson very much. She has a niece, Kacie Mast who lives locally.      She is taking  coreg 25 mg twice daily for her hypertension and diastolic dysfunction.     She is taking requipo 0.5  mg at bedtime - for helping the restless leg     She had a right ureteroscopy with laser lithotripsy and stone basketing on 5/12/23.  Her pain from the stone has resolved.  She still has a urinary  "stent.         PAST MEDICAL HISTORY:   1. Diabetes mellitus   2. Hypertension   3. Hyperlipidemia   4. Left heart catheterization January 2007 which revealed luminal irregularities in the LAD, left circumflex and right coronary artery. She had diastolic dysfunction and patent renal arteries.   5. Sleep apnea   6. Obesity.   7. Nephrolithiasis status post lithotripsy.   8. Reflux - had nonerosive gastropathy on EGD September 2007. Gastritis on EGD 9/2010  9. Hidradenitis suppurativa.   10. History of diverticulitis with a hospitalized October 2007.   11. Migraines   12. Obesity.   13. 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    14. Total hysterectomy in 1969.   15. Cholecystectomy was done at that time as well.   16. 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   17. Colon polyps on colonscopy 11/2010 - due 2013   18. Hospitalization 12/10 for e coli sepsis due to left ureteral stone with left nephrostomy tube in [place], [place]   19. Left knee replacement 9/2012      MEDICATIONS and ALLERGIES: Updated on EPIC.     PHYSICAL EXAMINATION:      /80 (BP Location: Right arm, Patient Position: Sitting, BP Method: Large (Manual))   Pulse 71   Ht 5' 8" (1.727 m)   Wt 90.7 kg (200 lb)   LMP  (LMP Unknown)   SpO2 100%   BMI 30.41 kg/m²       GENERAL: She is alert, oriented, no apparent distress. Affect within   normal limits.  Conjunctiva anicteric.  OP - possible thrush on tongue  NECK: Supple.   Respiratory: Effort normal. Lungs clear  HEART: Regular rate and rhythm without murmurs, gallops or rubs.   No lower extremity edema.  .   Stool brown and heme negative           ASSESSMENT AND PLAN:       1. Diabetes -Decrease Lantus to 25 units once daily. Labs today due to possible dehydration.   2. Cough - amoxicillin 875mg twice daily for 10 days  3. Possible thrush - fluconazole 150 mg daily for 5 days   2. Kidney stones - asx  3. Hypertension " with diastolic heart failure- stable. Negative PET stress.  stable on carvedilol 25 mg one tablet twice daily.   4.  OA neck and lumbar spine - on oxycodone. stable  5.  Gout - asx off allopurinol  6.  Mood disorder -  on duloxetine 60 mg twice daily  7.  Swallowing disorder - s/p EGD with dilation on 11/11/21.   8. Asthma - stable   9. Gerd -get on pantoprazole 40 mg  daily   10. Hyperlipidemia - off  lipitor  11. Allergic rhinitis - stable  12. CRI -labs. Could be dehydrated  13. Obesity - discussed diet, exercise and weight loss  14. CAD -risk factor modification  15. Aortic atherosclerosis and possible mesenteric artery stenosis- risk factor modification  16. Tortuous aorta - HTN controlled  17. Colon polyps - Colonoscopy in 8/2013 - 2 polyps. Flex sig 11/2013 - mild granular mucosa. Colonoscopy 6/2017 diverticulosis - no polyps due 2023.   18. hyperparathryodisism -saw nephrology   19. .Restless leg syndrome - get on roprinole       .  MMG 7/22        I will see her back in 1 week as scheduled,  sooner if problems arise

## 2024-01-31 NOTE — PATIENT INSTRUCTIONS
Decrease Lantus to 25 units once daily    Amoxicillin 500 mg twice daily for 10 days  Fluconazole 150 mg daily for 5 days for yeast

## 2024-02-04 DIAGNOSIS — R79.0 LOW MAGNESIUM LEVEL: Primary | ICD-10-CM

## 2024-02-04 RX ORDER — LANOLIN ALCOHOL/MO/W.PET/CERES
400 CREAM (GRAM) TOPICAL DAILY
Qty: 100 TABLET | Refills: 4 | Status: SHIPPED | OUTPATIENT
Start: 2024-02-04

## 2024-02-06 ENCOUNTER — TELEPHONE (OUTPATIENT)
Dept: INTERNAL MEDICINE | Facility: CLINIC | Age: 84
End: 2024-02-06
Payer: MEDICARE

## 2024-02-06 NOTE — TELEPHONE ENCOUNTER
----- Message from Jeannine Arriaga MD sent at 2/4/2024  9:13 PM CST -----  Please notify pt  Your blood work reveals that your blood count, blood sugar, kidney function, liver function are fine  Your magnesium level is low. You need to start on magnesium oxide 400 mg daily - DR YANEZ sent a prescription to the pharmacy  Jeannine Arriaga M.D.

## 2024-02-07 ENCOUNTER — OFFICE VISIT (OUTPATIENT)
Dept: INTERNAL MEDICINE | Facility: CLINIC | Age: 84
End: 2024-02-07
Payer: MEDICARE

## 2024-02-07 VITALS
HEART RATE: 74 BPM | HEIGHT: 68 IN | SYSTOLIC BLOOD PRESSURE: 132 MMHG | WEIGHT: 200 LBS | OXYGEN SATURATION: 98 % | BODY MASS INDEX: 30.31 KG/M2 | DIASTOLIC BLOOD PRESSURE: 70 MMHG

## 2024-02-07 DIAGNOSIS — E11.9 TYPE 2 DIABETES MELLITUS WITHOUT COMPLICATION, WITH LONG-TERM CURRENT USE OF INSULIN: ICD-10-CM

## 2024-02-07 DIAGNOSIS — I10 ESSENTIAL HYPERTENSION: Chronic | ICD-10-CM

## 2024-02-07 DIAGNOSIS — Z79.4 TYPE 2 DIABETES MELLITUS WITHOUT COMPLICATION, WITH LONG-TERM CURRENT USE OF INSULIN: ICD-10-CM

## 2024-02-07 DIAGNOSIS — K21.9 GASTROESOPHAGEAL REFLUX DISEASE WITHOUT ESOPHAGITIS: ICD-10-CM

## 2024-02-07 DIAGNOSIS — E78.2 MIXED HYPERLIPIDEMIA: Primary | ICD-10-CM

## 2024-02-07 PROCEDURE — 3078F DIAST BP <80 MM HG: CPT | Mod: HCNC,CPTII,S$GLB, | Performed by: INTERNAL MEDICINE

## 2024-02-07 PROCEDURE — 1126F AMNT PAIN NOTED NONE PRSNT: CPT | Mod: HCNC,CPTII,S$GLB, | Performed by: INTERNAL MEDICINE

## 2024-02-07 PROCEDURE — 99999 PR PBB SHADOW E&M-EST. PATIENT-LVL V: CPT | Mod: PBBFAC,HCNC,, | Performed by: INTERNAL MEDICINE

## 2024-02-07 PROCEDURE — 1101F PT FALLS ASSESS-DOCD LE1/YR: CPT | Mod: HCNC,CPTII,S$GLB, | Performed by: INTERNAL MEDICINE

## 2024-02-07 PROCEDURE — 1159F MED LIST DOCD IN RCRD: CPT | Mod: HCNC,CPTII,S$GLB, | Performed by: INTERNAL MEDICINE

## 2024-02-07 PROCEDURE — 3288F FALL RISK ASSESSMENT DOCD: CPT | Mod: HCNC,CPTII,S$GLB, | Performed by: INTERNAL MEDICINE

## 2024-02-07 PROCEDURE — 3075F SYST BP GE 130 - 139MM HG: CPT | Mod: HCNC,CPTII,S$GLB, | Performed by: INTERNAL MEDICINE

## 2024-02-07 PROCEDURE — 3072F LOW RISK FOR RETINOPATHY: CPT | Mod: HCNC,CPTII,S$GLB, | Performed by: INTERNAL MEDICINE

## 2024-02-07 PROCEDURE — 99214 OFFICE O/P EST MOD 30 MIN: CPT | Mod: HCNC,S$GLB,, | Performed by: INTERNAL MEDICINE

## 2024-02-07 NOTE — PROGRESS NOTES
CHIEF COMPLAINT:   Follow up of multple problems.      HISTORY OF PRESENT ILLNESS: This is a 83-year-old woman who presents for follow up of above    Since our last visit on 1/31/24, she has been feeling better. Weakness and has resolved. Cough is improved.  She continues to take amoxicillin 875 mg twice daily for a total of 10 days.   She took fluconazole 150 mg daily for 5 days for thrush - she is swallowing better. Thrush has resolved.     She is taking Lantus 30 units once in the evening.   She did NOT lower lantus.  Sugars are still . She is taking Trulicity 0.75 m g once a week on a Friday      She just started magnesium oxide 400 mg daily yesterday    She continues to have itching on her arms and neck. Comes and goes.       She continues to have neck and shoulder pain, back pain. She complains of back pain and body pain  which has been severe. She is taking oxycodone apap 10/325 up to 5 times daily.   She continues to take duloxetine 60 mg twice daily.  Her mood is doing ok.        She saw Dr Reilly on 1/18/22 - she continues to take Eliquis 5 mg twice daily.  She had cardiac PET stress 2/9/2022 which was negative for ischemia          No fever or chills. She has felt cold.  No nausea, vomiting, constipation, dysuria, hematuira.  She denies swallowing problems today.        She is living alone. She does not see her grandson very much. She has a niece, Kacie Mast who lives locally.      She is taking  coreg 25 mg twice daily for her hypertension and diastolic dysfunction.     She is taking requipo 0.5  mg at bedtime - for helping the restless leg     She had a right ureteroscopy with laser lithotripsy and stone basketing on 5/12/23.  Her pain from the stone has resolved.  She still has a urinary stent.         PAST MEDICAL HISTORY:   1. Diabetes mellitus   2. Hypertension   3. Hyperlipidemia   4. Left heart catheterization January 2007 which revealed luminal irregularities in the LAD, left circumflex and  "right coronary artery. She had diastolic dysfunction and patent renal arteries.   5. Sleep apnea   6. Obesity.   7. Nephrolithiasis status post lithotripsy.   8. Reflux - had nonerosive gastropathy on EGD September 2007. Gastritis on EGD 9/2010  9. Hidradenitis suppurativa.   10. History of diverticulitis with a hospitalized October 2007.   11. Migraines   12. Obesity.   13. 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    14. Total hysterectomy in 1969.   15. Cholecystectomy was done at that time as well.   16. 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   17. Colon polyps on colonscopy 11/2010 - due 2013   18. Hospitalization 12/10 for e coli sepsis due to left ureteral stone with left nephrostomy tube in [place], [place]   19. Left knee replacement 9/2012      MEDICATIONS and ALLERGIES: Updated on EPIC.     PHYSICAL EXAMINATION:       /70 (BP Location: Right arm, Patient Position: Sitting, BP Method: Large (Manual))   Pulse 74   Ht 5' 8" (1.727 m)   Wt 90.7 kg (200 lb)   LMP  (LMP Unknown)   SpO2 98%   BMI 30.41 kg/m²       GENERAL: She is alert, oriented, no apparent distress. Affect within   normal limits.  Conjunctiva anicteric.  OP - possible thrush on tongue  NECK: Supple.   Respiratory: Effort normal. Lungs clear  HEART: Regular rate and rhythm without murmurs, gallops or rubs.   No lower extremity edema.  .   Stool brown and heme negative           ASSESSMENT AND PLAN:       1. Diabetes -Decrease Lantus to 25 units once daily. \   2. Cough - finish amoxicillin 875mg twice daily for 10 days  3.  Kidney stones - asx  3. Hypertension with diastolic heart failure- stable. Negative PET stress.  stable on carvedilol 25 mg one tablet twice daily.   4.  OA neck and lumbar spine - on oxycodone. stable  5.  Gout - asx off allopurinol  6.  Mood disorder -  on duloxetine 60 mg twice daily  7.  Swallowing disorder - s/p EGD with dilation on " 11/11/21.   8. Asthma - stable   9. Gerd -get on pantoprazole 40 mg  daily   10. Hyperlipidemia - off  lipitor  11. Allergic rhinitis - stable  12. CRI -labs. Could be dehydrated  13. Obesity - discussed diet, exercise and weight loss  14. CAD -risk factor modification  15. Aortic atherosclerosis and possible mesenteric artery stenosis- risk factor modification  16. Tortuous aorta - HTN controlled  17. Colon polyps - Colonoscopy in 8/2013 - 2 polyps. Flex sig 11/2013 - mild granular mucosa. Colonoscopy 6/2017 diverticulosis - no polyps due 2023.   18. hyperparathryodisism -saw nephrology   19. .Restless leg syndrome - t on roprinole       .  MMG 7/22 - has been ordered        I will see her back in 2 months, Will see Margaret CHAVEZ in one month,  sooner if problems arise

## 2024-03-04 DIAGNOSIS — E11.9 TYPE 2 DIABETES MELLITUS WITHOUT COMPLICATION, WITH LONG-TERM CURRENT USE OF INSULIN: ICD-10-CM

## 2024-03-04 DIAGNOSIS — Z79.4 TYPE 2 DIABETES MELLITUS WITHOUT COMPLICATION, WITH LONG-TERM CURRENT USE OF INSULIN: ICD-10-CM

## 2024-03-04 RX ORDER — DULAGLUTIDE 0.75 MG/.5ML
0.75 INJECTION, SOLUTION SUBCUTANEOUS
Qty: 4 PEN | Refills: 2 | Status: SHIPPED | OUTPATIENT
Start: 2024-03-04 | End: 2024-04-03 | Stop reason: SDUPTHER

## 2024-03-04 RX ORDER — OXYCODONE AND ACETAMINOPHEN 10; 325 MG/1; MG/1
1 TABLET ORAL
Qty: 150 TABLET | Refills: 0 | Status: SHIPPED | OUTPATIENT
Start: 2024-03-04 | End: 2024-03-06 | Stop reason: SDUPTHER

## 2024-03-04 NOTE — TELEPHONE ENCOUNTER
No care due was identified.  Health [facility] Embedded Care Due Messages. Reference number: [id number].   3/04/2024 8:58:41 AM CST

## 2024-03-06 ENCOUNTER — OFFICE VISIT (OUTPATIENT)
Dept: INTERNAL MEDICINE | Facility: CLINIC | Age: 84
End: 2024-03-06
Payer: MEDICARE

## 2024-03-06 VITALS
OXYGEN SATURATION: 96 % | BODY MASS INDEX: 31.06 KG/M2 | DIASTOLIC BLOOD PRESSURE: 88 MMHG | HEIGHT: 68 IN | HEART RATE: 103 BPM | WEIGHT: 204.94 LBS | SYSTOLIC BLOOD PRESSURE: 142 MMHG

## 2024-03-06 DIAGNOSIS — E11.42 TYPE 2 DIABETES MELLITUS WITH DIABETIC POLYNEUROPATHY, WITH LONG-TERM CURRENT USE OF INSULIN: ICD-10-CM

## 2024-03-06 DIAGNOSIS — G89.4 CHRONIC PAIN SYNDROME: Primary | ICD-10-CM

## 2024-03-06 DIAGNOSIS — E11.42 DIABETIC POLYNEUROPATHY ASSOCIATED WITH TYPE 2 DIABETES MELLITUS: ICD-10-CM

## 2024-03-06 DIAGNOSIS — Z79.4 TYPE 2 DIABETES MELLITUS WITH STAGE 3 CHRONIC KIDNEY DISEASE, WITH LONG-TERM CURRENT USE OF INSULIN: ICD-10-CM

## 2024-03-06 DIAGNOSIS — M79.89 LEG SWELLING: ICD-10-CM

## 2024-03-06 DIAGNOSIS — I10 HYPERTENSION, ESSENTIAL: ICD-10-CM

## 2024-03-06 DIAGNOSIS — Z79.4 TYPE 2 DIABETES MELLITUS WITH DIABETIC POLYNEUROPATHY, WITH LONG-TERM CURRENT USE OF INSULIN: ICD-10-CM

## 2024-03-06 DIAGNOSIS — N18.30 TYPE 2 DIABETES MELLITUS WITH STAGE 3 CHRONIC KIDNEY DISEASE, WITH LONG-TERM CURRENT USE OF INSULIN: ICD-10-CM

## 2024-03-06 DIAGNOSIS — E11.22 TYPE 2 DIABETES MELLITUS WITH STAGE 3 CHRONIC KIDNEY DISEASE, WITH LONG-TERM CURRENT USE OF INSULIN: ICD-10-CM

## 2024-03-06 PROCEDURE — 3072F LOW RISK FOR RETINOPATHY: CPT | Mod: HCNC,CPTII,S$GLB, | Performed by: PHYSICIAN ASSISTANT

## 2024-03-06 PROCEDURE — 1159F MED LIST DOCD IN RCRD: CPT | Mod: HCNC,CPTII,S$GLB, | Performed by: PHYSICIAN ASSISTANT

## 2024-03-06 PROCEDURE — 1160F RVW MEDS BY RX/DR IN RCRD: CPT | Mod: HCNC,CPTII,S$GLB, | Performed by: PHYSICIAN ASSISTANT

## 2024-03-06 PROCEDURE — 3077F SYST BP >= 140 MM HG: CPT | Mod: HCNC,CPTII,S$GLB, | Performed by: PHYSICIAN ASSISTANT

## 2024-03-06 PROCEDURE — 99999 PR PBB SHADOW E&M-EST. PATIENT-LVL V: CPT | Mod: PBBFAC,HCNC,, | Performed by: PHYSICIAN ASSISTANT

## 2024-03-06 PROCEDURE — 1101F PT FALLS ASSESS-DOCD LE1/YR: CPT | Mod: HCNC,CPTII,S$GLB, | Performed by: PHYSICIAN ASSISTANT

## 2024-03-06 PROCEDURE — 3288F FALL RISK ASSESSMENT DOCD: CPT | Mod: HCNC,CPTII,S$GLB, | Performed by: PHYSICIAN ASSISTANT

## 2024-03-06 PROCEDURE — 99214 OFFICE O/P EST MOD 30 MIN: CPT | Mod: HCNC,S$GLB,, | Performed by: PHYSICIAN ASSISTANT

## 2024-03-06 PROCEDURE — 1125F AMNT PAIN NOTED PAIN PRSNT: CPT | Mod: HCNC,CPTII,S$GLB, | Performed by: PHYSICIAN ASSISTANT

## 2024-03-06 PROCEDURE — 3079F DIAST BP 80-89 MM HG: CPT | Mod: HCNC,CPTII,S$GLB, | Performed by: PHYSICIAN ASSISTANT

## 2024-03-06 RX ORDER — INSULIN GLARGINE 100 [IU]/ML
25 INJECTION, SOLUTION SUBCUTANEOUS DAILY
Start: 2024-03-06 | End: 2024-03-06 | Stop reason: SDUPTHER

## 2024-03-06 RX ORDER — INSULIN ASPART 100 [IU]/ML
INJECTION, SOLUTION INTRAVENOUS; SUBCUTANEOUS
Qty: 45 ML | Refills: 3
Start: 2024-03-06

## 2024-03-06 RX ORDER — OXYCODONE AND ACETAMINOPHEN 10; 325 MG/1; MG/1
1 TABLET ORAL
Qty: 150 TABLET | Refills: 0 | Status: SHIPPED | OUTPATIENT
Start: 2024-03-06 | End: 2024-04-03 | Stop reason: SDUPTHER

## 2024-03-06 RX ORDER — INSULIN GLARGINE 100 [IU]/ML
24 INJECTION, SOLUTION SUBCUTANEOUS DAILY
Start: 2024-03-06

## 2024-03-06 RX ORDER — FUROSEMIDE 20 MG/1
TABLET ORAL
Qty: 30 TABLET | Refills: 3 | Status: SHIPPED | OUTPATIENT
Start: 2024-03-06 | End: 2024-05-10 | Stop reason: SDUPTHER

## 2024-03-06 NOTE — TELEPHONE ENCOUNTER
No care due was identified.  Health [facility] Embedded Care Due Messages. Reference number: [id number].   3/06/2024 11:49:03 AM CST

## 2024-03-06 NOTE — PROGRESS NOTES
Subjective:       Patient ID: Henrietta Villasenor is a 84 y.o. female.        Chief Complaint: Follow-up    Henrietta Villasenor is an established patient of Jeannine Arriaga MD here today for follow up visit.    She brings all pill bottles.  She is missing eliquis and lasix bottle is empty.  She did not bring insulin or trulicity.    She confirms she is now taking correct dosage of lantus at 24 U daily and novolog 10 U with meals and hold for blood sugar <100.  She is doing well today and has no complaints.  Her mood is good today.    DM -    Trulicity 0.75 mg/wee  Lantus 24 U daily  Novolog 10 U with meals, hold for blood sugar <100  Blood sugar  at home     Lab Results       Component                Value               Date                       HGBA1C                   7.2 (H)             01/31/2024                 HGBA1C                   6.8 (H)             12/06/2023                 HGBA1C                   6.4 (H)             09/08/2023               HTN -   Carvedilol 25 mg BID  Amlodipine 10 mg daily     /88 today     Breathing is doing well, she has an intermittent cough that has been better since treatment with amoxil, also took diflucan for thrush and swallowing is better     Chronic pain due to OA of neck and lumbar spine and due for oxycodone refill from PCP, also on cymbalta 60 mg BID     RLS tx with requip     She had a right ureteroscopy with laser lithotripsy and stone basketing on 5/12/23    GERD tx with protonix 40 mg    Lipid tx with lipitor 40 mg                Review of Systems   Constitutional:  Negative for chills, diaphoresis, fatigue and fever.   HENT:  Negative for congestion and sore throat.    Eyes:  Negative for visual disturbance.   Respiratory:  Negative for cough, chest tightness and shortness of breath.    Cardiovascular:  Negative for chest pain, palpitations and leg swelling.   Gastrointestinal:  Negative for abdominal pain, blood in stool, constipation, diarrhea, nausea and  vomiting.   Genitourinary:  Negative for dysuria, frequency, hematuria and urgency.   Musculoskeletal:  Negative for arthralgias and back pain.   Skin:  Negative for rash.   Neurological:  Negative for dizziness, syncope, weakness and headaches.   Psychiatric/Behavioral:  Negative for dysphoric mood and sleep disturbance. The patient is not nervous/anxious.        Objective:      Physical Exam  Vitals and nursing note reviewed.   Constitutional:       Appearance: Normal appearance. She is well-developed.   HENT:      Head: Normocephalic.      Right Ear: External ear normal.      Left Ear: External ear normal.   Eyes:      Pupils: Pupils are equal, round, and reactive to light.   Cardiovascular:      Rate and Rhythm: Normal rate and regular rhythm.      Heart sounds: Normal heart sounds. No murmur heard.     No friction rub. No gallop.   Pulmonary:      Effort: Pulmonary effort is normal. No respiratory distress.      Breath sounds: Normal breath sounds.   Abdominal:      Palpations: Abdomen is soft.      Tenderness: There is no abdominal tenderness.   Skin:     General: Skin is warm and dry.   Neurological:      Mental Status: She is alert.         Assessment:       1. Chronic pain syndrome    2. Type 2 diabetes mellitus with diabetic polyneuropathy, with long-term current use of insulin    3. Type 2 diabetes mellitus with stage 3 chronic kidney disease, with long-term current use of insulin    4. Diabetic polyneuropathy associated with type 2 diabetes mellitus    5. Leg swelling    6. Hypertension, essential        Plan:       Henrietta was seen today for follow-up.    Diagnoses and all orders for this visit:    Chronic pain syndrome - refill request sent to PCP    Type 2 diabetes mellitus with diabetic polyneuropathy, with long-term current use of insulin  -     insulin (LANTUS SOLOSTAR U-100 INSULIN) glargine 100 units/mL SubQ pen; Inject 24 Units into the skin once daily.    Type 2 diabetes mellitus with stage 3  "chronic kidney disease, with long-term current use of insulin  -     insulin (LANTUS SOLOSTAR U-100 INSULIN) glargine 100 units/mL SubQ pen; Inject 24 Units into the skin once daily.    Diabetic polyneuropathy associated with type 2 diabetes mellitus  -     insulin aspart U-100 (NOVOLOG) 100 unit/mL (3 mL) InPn pen; Inject 10 units before meals, hold if sugar is less than 100. Max daily 45 units.    Leg swelling  -     furosemide (LASIX) 20 MG tablet; Take 1 tablet by mouth on Monday, Wednesday, and Friday    Hypertension, essential - stable and controlled, continue current regimen    Other orders  -     apixaban (ELIQUIS) 5 mg Tab; Take 1 tablet (5 mg total) by mouth 2 (two) times daily.    F/u with PCP in 1 month as scheduled  Doing well today    Pt has been given instructions populated from patient instructions database and has verbalized understanding of the after visit summary and information contained wherein.    Follow up with a primary care provider. May go to ER for acute shortness of breath, lightheadedness, fever, or any other emergent complaints or changes in condition.    "This note will be shared with the patient"    Future Appointments   Date Time Provider Department Center   4/3/2024 11:30 AM Jeannine Arriaga MD [facility] [place]   5/9/2024  1:00 PM Margaret Cabrera PA-C [facility] [place]   6/14/2024  9:00 AM Jeannine Arriaga MD [facility] [place]                 "

## 2024-04-03 ENCOUNTER — OFFICE VISIT (OUTPATIENT)
Dept: INTERNAL MEDICINE | Facility: CLINIC | Age: 84
End: 2024-04-03
Payer: MEDICARE

## 2024-04-03 VITALS
SYSTOLIC BLOOD PRESSURE: 128 MMHG | BODY MASS INDEX: 31 KG/M2 | WEIGHT: 204.56 LBS | OXYGEN SATURATION: 98 % | HEART RATE: 74 BPM | DIASTOLIC BLOOD PRESSURE: 80 MMHG | HEIGHT: 68 IN

## 2024-04-03 DIAGNOSIS — Z79.4 TYPE 2 DIABETES MELLITUS WITHOUT COMPLICATION, WITH LONG-TERM CURRENT USE OF INSULIN: ICD-10-CM

## 2024-04-03 DIAGNOSIS — I25.119 CORONARY ARTERY DISEASE INVOLVING NATIVE CORONARY ARTERY OF NATIVE HEART WITH ANGINA PECTORIS: ICD-10-CM

## 2024-04-03 DIAGNOSIS — K21.9 GASTROESOPHAGEAL REFLUX DISEASE, UNSPECIFIED WHETHER ESOPHAGITIS PRESENT: ICD-10-CM

## 2024-04-03 DIAGNOSIS — E11.9 TYPE 2 DIABETES MELLITUS WITHOUT COMPLICATION, WITH LONG-TERM CURRENT USE OF INSULIN: ICD-10-CM

## 2024-04-03 DIAGNOSIS — Z79.4 TYPE 2 DIABETES MELLITUS WITH DIABETIC POLYNEUROPATHY, WITH LONG-TERM CURRENT USE OF INSULIN: ICD-10-CM

## 2024-04-03 DIAGNOSIS — G89.29 OTHER CHRONIC BACK PAIN: Primary | Chronic | ICD-10-CM

## 2024-04-03 DIAGNOSIS — E78.2 MIXED HYPERLIPIDEMIA: ICD-10-CM

## 2024-04-03 DIAGNOSIS — I50.32 CHRONIC DIASTOLIC HEART FAILURE: ICD-10-CM

## 2024-04-03 DIAGNOSIS — M54.9 OTHER CHRONIC BACK PAIN: Primary | Chronic | ICD-10-CM

## 2024-04-03 DIAGNOSIS — I82.433 ACUTE DEEP VEIN THROMBOSIS (DVT) OF POPLITEAL VEIN OF BOTH LOWER EXTREMITIES: ICD-10-CM

## 2024-04-03 DIAGNOSIS — I10 ESSENTIAL HYPERTENSION: Chronic | ICD-10-CM

## 2024-04-03 DIAGNOSIS — E11.42 TYPE 2 DIABETES MELLITUS WITH DIABETIC POLYNEUROPATHY, WITH LONG-TERM CURRENT USE OF INSULIN: ICD-10-CM

## 2024-04-03 DIAGNOSIS — I70.0 AORTIC ATHEROSCLEROSIS: ICD-10-CM

## 2024-04-03 DIAGNOSIS — F39 MOOD DISORDER: ICD-10-CM

## 2024-04-03 PROCEDURE — 1159F MED LIST DOCD IN RCRD: CPT | Mod: HCNC,CPTII,S$GLB, | Performed by: INTERNAL MEDICINE

## 2024-04-03 PROCEDURE — 99214 OFFICE O/P EST MOD 30 MIN: CPT | Mod: HCNC,S$GLB,, | Performed by: INTERNAL MEDICINE

## 2024-04-03 PROCEDURE — 99999 PR PBB SHADOW E&M-EST. PATIENT-LVL IV: CPT | Mod: PBBFAC,HCNC,, | Performed by: INTERNAL MEDICINE

## 2024-04-03 PROCEDURE — 1125F AMNT PAIN NOTED PAIN PRSNT: CPT | Mod: HCNC,CPTII,S$GLB, | Performed by: INTERNAL MEDICINE

## 2024-04-03 PROCEDURE — 3079F DIAST BP 80-89 MM HG: CPT | Mod: HCNC,CPTII,S$GLB, | Performed by: INTERNAL MEDICINE

## 2024-04-03 PROCEDURE — 3072F LOW RISK FOR RETINOPATHY: CPT | Mod: HCNC,CPTII,S$GLB, | Performed by: INTERNAL MEDICINE

## 2024-04-03 PROCEDURE — 1101F PT FALLS ASSESS-DOCD LE1/YR: CPT | Mod: HCNC,CPTII,S$GLB, | Performed by: INTERNAL MEDICINE

## 2024-04-03 PROCEDURE — 3288F FALL RISK ASSESSMENT DOCD: CPT | Mod: HCNC,CPTII,S$GLB, | Performed by: INTERNAL MEDICINE

## 2024-04-03 PROCEDURE — 3074F SYST BP LT 130 MM HG: CPT | Mod: HCNC,CPTII,S$GLB, | Performed by: INTERNAL MEDICINE

## 2024-04-03 RX ORDER — CARVEDILOL 25 MG/1
25 TABLET ORAL 2 TIMES DAILY
Qty: 180 TABLET | Refills: 1 | Status: SHIPPED | OUTPATIENT
Start: 2024-04-03

## 2024-04-03 RX ORDER — AMLODIPINE BESYLATE 10 MG/1
10 TABLET ORAL DAILY
Qty: 90 TABLET | Refills: 0 | Status: SHIPPED | OUTPATIENT
Start: 2024-04-03 | End: 2024-06-14 | Stop reason: SDUPTHER

## 2024-04-03 RX ORDER — PANTOPRAZOLE SODIUM 40 MG/1
40 TABLET, DELAYED RELEASE ORAL DAILY
Qty: 90 TABLET | Refills: 1 | Status: SHIPPED | OUTPATIENT
Start: 2024-04-03

## 2024-04-03 RX ORDER — DULAGLUTIDE 0.75 MG/.5ML
0.75 INJECTION, SOLUTION SUBCUTANEOUS
Qty: 4 PEN | Refills: 2 | Status: SHIPPED | OUTPATIENT
Start: 2024-04-03 | End: 2024-06-14 | Stop reason: SDUPTHER

## 2024-04-03 RX ORDER — OXYCODONE AND ACETAMINOPHEN 10; 325 MG/1; MG/1
1 TABLET ORAL
Qty: 150 TABLET | Refills: 0 | Status: SHIPPED | OUTPATIENT
Start: 2024-04-03 | End: 2024-05-01 | Stop reason: SDUPTHER

## 2024-04-03 NOTE — PROGRESS NOTES
CHIEF COMPLAINT:   Follow up of multple problems.      HISTORY OF PRESENT ILLNESS: This is a 83-year-old woman who presents for follow up of above     She is taking Lantus 24 units once in the evening.  She takes novolog 14 units with meals.  Sugars are still 116-130. She is taking Trulicity 0.75 m g once a week on a Friday  - did not have the the shot last week. She is out.      She is taking magnesium oxide 400 mg daily     She continues to have itching on her arms and neck. Comes and goes.       She continues to have neck and shoulder pain, back pain. She complains of back pain and body pain  which has been severe. She is taking oxycodone apap 10/325 up to 5 times daily.   She continues to take duloxetine 60 mg twice daily.  Her mood is doing ok.        She saw Dr Reilly on 1/18/22 - she continues to take Eliquis 5 mg twice daily.  She had cardiac PET stress 2/9/2022 which was negative for ischemia          No fever or chills. She has felt cold.  No nausea, vomiting, constipation, dysuria, hematuira.  She denies swallowing problems today.        She is living alone. She does not see her grandson very much. She has a niece, Kacie Mast who lives locally.      She is taking  coreg 25 mg twice daily for her hypertension and diastolic dysfunction.     She is taking requipo 0.5  mg at bedtime - for helping the restless leg     She had a right ureteroscopy with laser lithotripsy and stone basketing on 5/12/23.  Her pain from the stone has resolved.  She still has a urinary stent.         PAST MEDICAL HISTORY:   1. Diabetes mellitus   2. Hypertension   3. Hyperlipidemia   4. Left heart catheterization January 2007 which revealed luminal irregularities in the LAD, left circumflex and right coronary artery. She had diastolic dysfunction and patent renal arteries.   5. Sleep apnea   6. Obesity.   7. Nephrolithiasis status post lithotripsy.   8. Reflux - had nonerosive gastropathy on EGD September 2007. Gastritis on EGD  "9/2010  9. Hidradenitis suppurativa.   10. History of diverticulitis with a hospitalized October 2007.   11. Migraines   12. Obesity.   13. 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    14. Total hysterectomy in 1969.   15. Cholecystectomy was done at that time as well.   16. 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   17. Colon polyps on colonscopy 11/2010 - due 2013   18. Hospitalization 12/10 for e coli sepsis due to left ureteral stone with left nephrostomy tube in [place], [place]   19. Left knee replacement 9/2012      MEDICATIONS and ALLERGIES: Updated on EPIC.     PHYSICAL EXAMINATION:     /80 (BP Location: Right arm, Patient Position: Sitting, BP Method: Large (Manual))   Pulse 74   Ht 5' 8" (1.727 m)   Wt 92.8 kg (204 lb 9.4 oz)   LMP  (LMP Unknown)   SpO2 98%   BMI 31.11 kg/m²          GENERAL: She is alert, oriented, no apparent distress. Affect within   normal limits.  Conjunctiva anicteric.  OP - possible thrush on tongue  NECK: Supple.   Respiratory: Effort normal. Lungs clear  HEART: Regular rate and rhythm without murmurs, gallops or rubs.   No lower extremity edema.  .      ASSESSMENT AND PLAN:       1. Diabetes -stable. Get on Trulicity  2.  Kidney stones - asx  3. Hypertension with diastolic heart failure- stable. Negative PET stress.  stable on carvedilol 25 mg one tablet twice daily and amlodipine 10 mg once daily.   4.  OA neck and lumbar spine - on oxycodone. stable  5.  Gout - asx off allopurinol  6.  Mood disorder -  on duloxetine 60 mg twice daily  7.  Swallowing disorder - s/p EGD with dilation on 11/11/21.   8. Asthma - stable   9. Gerd -get on pantoprazole 40 mg  daily   10. Hyperlipidemia - off  lipitor  11. Allergic rhinitis - stable  12. CRI -stable  13. Obesity - discussed diet, exercise and weight loss  14. CAD -risk factor modification  15. Aortic atherosclerosis and possible mesenteric artery " stenosis- risk factor modification  16. Tortuous aorta - HTN controlled  17. Colon polyps - Colonoscopy in 8/2013 - 2 polyps. Flex sig 11/2013 - mild granular mucosa. Colonoscopy 6/2017 diverticulosis - no polyps due 2023. Defer due to age.   18. hyperparathryodisism -saw nephrology   19. .Restless leg syndrome - on roprinole       .  MMG 7/22 - scheduled        I will see her back in 2 months, Will see Margaret CHAVEZ in one month,  sooner if problems arise

## 2024-04-11 ENCOUNTER — OFFICE VISIT (OUTPATIENT)
Dept: OPTOMETRY | Facility: CLINIC | Age: 84
End: 2024-04-11
Payer: COMMERCIAL

## 2024-04-11 DIAGNOSIS — Z79.4 TYPE 2 DIABETES MELLITUS WITHOUT COMPLICATION, WITH LONG-TERM CURRENT USE OF INSULIN: Primary | ICD-10-CM

## 2024-04-11 DIAGNOSIS — Z13.5 GLAUCOMA SCREENING: ICD-10-CM

## 2024-04-11 DIAGNOSIS — E11.9 TYPE 2 DIABETES MELLITUS WITHOUT COMPLICATION, WITH LONG-TERM CURRENT USE OF INSULIN: Primary | ICD-10-CM

## 2024-04-11 DIAGNOSIS — Z96.1 PSEUDOPHAKIA OF BOTH EYES: ICD-10-CM

## 2024-04-11 DIAGNOSIS — H52.223 REGULAR ASTIGMATISM OF BOTH EYES: ICD-10-CM

## 2024-04-11 PROCEDURE — 92015 DETERMINE REFRACTIVE STATE: CPT | Mod: S$GLB,,, | Performed by: OPTOMETRIST

## 2024-04-11 PROCEDURE — 92014 COMPRE OPH EXAM EST PT 1/>: CPT | Mod: S$GLB,,, | Performed by: OPTOMETRIST

## 2024-04-11 PROCEDURE — 99999 PR PBB SHADOW E&M-EST. PATIENT-LVL V: CPT | Mod: PBBFAC,,, | Performed by: OPTOMETRIST

## 2024-04-11 NOTE — PROGRESS NOTES
HPI    Annual Diabetic Eye Exam   Pt states vision is blurry   Pt reports she broke her specs almost 1 year ago, requests refraction     Pt reports flashes   Pt denies floaters     Pt reports Dry/ Itchy/ Burning  Gtt: Yes At therapy as needed   Pt reports mild relief     DM Dx'ed   BS: 138  Hemoglobin A1C       Date                     Value               Ref Range             Status                01/31/2024               7.2 (H)             4.0 - 5.6 %           Final                   12/06/2023               6.8 (H)             4.0 - 5.6 %           Final                   09/08/2023               6.4 (H)             4.0 - 5.6 %           Final                Last edited by Andrea Farrell, OD on 4/11/2024  8:30 AM.            Assessment /Plan     For exam results, see Encounter Report.    Type 2 diabetes mellitus without complication, with long-term current use of insulin  -     Ambulatory referral/consult to Optometry  -No retinopathy noted today.  Continued control with primary care physician and annual comprehensive eye exam.    Pseudophakia of both eyes  -clear, centered    Glaucoma screening  -Monitor with annual eye exam and IOP check    Regular astigmatism of both eyes  Eyeglass Final Rx       Eyeglass Final Rx         Sphere Cylinder Axis Dist VA Add    Right Trezevant +0.50 180 20/30 +2.50    Left +0.75 +0.50 180 20/30 +2.50      Expiration Date: 4/11/2025                      RTC 1 yr

## 2024-04-18 NOTE — TELEPHONE ENCOUNTER
Refill Routing Note   Medication(s) are not appropriate for processing by [facility] for the following reason(s):        Outside of protocol    [facility] action(s):  Route               Appointments  past 12m or future 3m with PCP    Date Provider   Last Visit   4/3/2024 Jeannine Arriaga MD   Next Visit   6/14/2024 Jeannine Arriaga MD   ED visits in past 90 days: 0        Note composed:4:23 PM 04/18/2024

## 2024-04-18 NOTE — TELEPHONE ENCOUNTER
----- Message from Nydia Bills sent at 4/18/2024  3:51 PM CDT -----  Regarding: Medication Refill  Pt came in asking for a refill of Eliquis 5 Mg. Please assist

## 2024-04-23 ENCOUNTER — HOSPITAL ENCOUNTER (EMERGENCY)
Facility: HOSPITAL | Age: 84
Discharge: HOME | End: 2024-04-24
Attending: STUDENT IN AN ORGANIZED HEALTH CARE EDUCATION/TRAINING PROGRAM
Payer: MEDICARE

## 2024-04-23 VITALS
RESPIRATION RATE: 16 BRPM | BODY MASS INDEX: 31.52 KG/M2 | HEART RATE: 71 BPM | TEMPERATURE: 98 F | HEIGHT: 68 IN | DIASTOLIC BLOOD PRESSURE: 97 MMHG | WEIGHT: 208 LBS | OXYGEN SATURATION: 97 % | SYSTOLIC BLOOD PRESSURE: 182 MMHG

## 2024-04-23 DIAGNOSIS — R06.02 SHORTNESS OF BREATH: ICD-10-CM

## 2024-04-23 DIAGNOSIS — E04.1 THYROID NODULE: ICD-10-CM

## 2024-04-23 DIAGNOSIS — M54.50 ACUTE LEFT-SIDED LOW BACK PAIN WITHOUT SCIATICA: ICD-10-CM

## 2024-04-23 DIAGNOSIS — M25.512 LEFT SHOULDER PAIN: Primary | ICD-10-CM

## 2024-04-23 LAB
ALBUMIN SERPL BCP-MCNC: 3.5 G/DL (ref 3.5–5.2)
ALP SERPL-CCNC: 91 U/L (ref 55–135)
ALT SERPL W/O P-5'-P-CCNC: 23 U/L (ref 10–44)
ANION GAP SERPL CALC-SCNC: 11 MMOL/L (ref 8–16)
AST SERPL-CCNC: 29 U/L (ref 10–40)
BACTERIA #/AREA URNS AUTO: ABNORMAL /HPF
BASOPHILS # BLD AUTO: 0.08 K/UL (ref 0–0.2)
BASOPHILS NFR BLD: 0.8 % (ref 0–1.9)
BILIRUB SERPL-MCNC: 0.4 MG/DL (ref 0.1–1)
BILIRUB UR QL STRIP: NEGATIVE
BNP SERPL-MCNC: 127 PG/ML (ref 0–99)
BUN SERPL-MCNC: 21 MG/DL (ref 8–23)
CALCIUM SERPL-MCNC: 9.9 MG/DL (ref 8.7–10.5)
CHLORIDE SERPL-SCNC: 106 MMOL/L (ref 95–110)
CLARITY UR REFRACT.AUTO: ABNORMAL
CO2 SERPL-SCNC: 25 MMOL/L (ref 23–29)
COLOR UR AUTO: YELLOW
CREAT SERPL-MCNC: 1.4 MG/DL (ref 0.5–1.4)
DIFFERENTIAL METHOD BLD: ABNORMAL
EOSINOPHIL # BLD AUTO: 0.2 K/UL (ref 0–0.5)
EOSINOPHIL NFR BLD: 2.4 % (ref 0–8)
ERYTHROCYTE [DISTWIDTH] IN BLOOD BY AUTOMATED COUNT: 15.5 % (ref 11.5–14.5)
EST. GFR  (NO RACE VARIABLE): 37.1 ML/MIN/1.73 M^2
GLUCOSE SERPL-MCNC: 131 MG/DL (ref 70–110)
GLUCOSE UR QL STRIP: NEGATIVE
HCT VFR BLD AUTO: 38.9 % (ref 37–48.5)
HCV AB SERPL QL IA: NORMAL
HGB BLD-MCNC: 12.4 G/DL (ref 12–16)
HGB UR QL STRIP: NEGATIVE
HIV 1+2 AB+HIV1 P24 AG SERPL QL IA: NORMAL
HYALINE CASTS UR QL AUTO: 11 /LPF
IMM GRANULOCYTES # BLD AUTO: 0.04 K/UL (ref 0–0.04)
IMM GRANULOCYTES NFR BLD AUTO: 0.4 % (ref 0–0.5)
KETONES UR QL STRIP: NEGATIVE
LEUKOCYTE ESTERASE UR QL STRIP: ABNORMAL
LYMPHOCYTES # BLD AUTO: 2.8 K/UL (ref 1–4.8)
LYMPHOCYTES NFR BLD: 27.4 % (ref 18–48)
MCH RBC QN AUTO: 27.4 PG (ref 27–31)
MCHC RBC AUTO-ENTMCNC: 31.9 G/DL (ref 32–36)
MCV RBC AUTO: 86 FL (ref 82–98)
MICROSCOPIC COMMENT: ABNORMAL
MONOCYTES # BLD AUTO: 1 K/UL (ref 0.3–1)
MONOCYTES NFR BLD: 10.3 % (ref 4–15)
NEUTROPHILS # BLD AUTO: 6 K/UL (ref 1.8–7.7)
NEUTROPHILS NFR BLD: 58.7 % (ref 38–73)
NITRITE UR QL STRIP: NEGATIVE
NRBC BLD-RTO: 0 /100 WBC
PH UR STRIP: 5 [PH] (ref 5–8)
PLATELET # BLD AUTO: 247 K/UL (ref 150–450)
PMV BLD AUTO: 10.1 FL (ref 9.2–12.9)
POTASSIUM SERPL-SCNC: 3.6 MMOL/L (ref 3.5–5.1)
PROT SERPL-MCNC: 8.1 G/DL (ref 6–8.4)
PROT UR QL STRIP: NEGATIVE
RBC # BLD AUTO: 4.52 M/UL (ref 4–5.4)
RBC #/AREA URNS AUTO: 4 /HPF (ref 0–4)
SODIUM SERPL-SCNC: 142 MMOL/L (ref 136–145)
SP GR UR STRIP: 1.02 (ref 1–1.03)
SQUAMOUS #/AREA URNS AUTO: 15 /HPF
TROPONIN I SERPL DL<=0.01 NG/ML-MCNC: <0.006 NG/ML (ref 0–0.03)
URN SPEC COLLECT METH UR: ABNORMAL
WBC # BLD AUTO: 10.13 K/UL (ref 3.9–12.7)
WBC #/AREA URNS AUTO: 13 /HPF (ref 0–5)

## 2024-04-23 PROCEDURE — 94761 N-INVAS EAR/PLS OXIMETRY MLT: CPT | Mod: HCNC

## 2024-04-23 PROCEDURE — 87389 HIV-1 AG W/HIV-1&-2 AB AG IA: CPT | Mod: HCNC | Performed by: PHYSICIAN ASSISTANT

## 2024-04-23 PROCEDURE — 93005 ELECTROCARDIOGRAM TRACING: CPT | Mod: HCNC

## 2024-04-23 PROCEDURE — 80053 COMPREHEN METABOLIC PANEL: CPT | Mod: HCNC

## 2024-04-23 PROCEDURE — 81001 URINALYSIS AUTO W/SCOPE: CPT | Mod: HCNC

## 2024-04-23 PROCEDURE — 87086 URINE CULTURE/COLONY COUNT: CPT | Mod: HCNC

## 2024-04-23 PROCEDURE — 93010 ELECTROCARDIOGRAM REPORT: CPT | Mod: HCNC,,, | Performed by: INTERNAL MEDICINE

## 2024-04-23 PROCEDURE — 86803 HEPATITIS C AB TEST: CPT | Mod: HCNC | Performed by: PHYSICIAN ASSISTANT

## 2024-04-23 PROCEDURE — 83880 ASSAY OF NATRIURETIC PEPTIDE: CPT | Mod: HCNC

## 2024-04-23 PROCEDURE — 99285 EMERGENCY DEPT VISIT HI MDM: CPT | Mod: 25,HCNC

## 2024-04-23 PROCEDURE — 85025 COMPLETE CBC W/AUTO DIFF WBC: CPT | Mod: HCNC

## 2024-04-23 PROCEDURE — 25000003 PHARM REV CODE 250: Mod: HCNC

## 2024-04-23 PROCEDURE — 84484 ASSAY OF TROPONIN QUANT: CPT | Mod: HCNC

## 2024-04-23 PROCEDURE — 25500020 PHARM REV CODE 255: Mod: HCNC | Performed by: STUDENT IN AN ORGANIZED HEALTH CARE EDUCATION/TRAINING PROGRAM

## 2024-04-23 RX ORDER — ACETAMINOPHEN 500 MG
1000 TABLET ORAL
Status: COMPLETED | OUTPATIENT
Start: 2024-04-23 | End: 2024-04-23

## 2024-04-23 RX ADMIN — ACETAMINOPHEN 1000 MG: 500 TABLET ORAL at 11:04

## 2024-04-23 RX ADMIN — IOHEXOL 100 ML: 350 INJECTION, SOLUTION INTRAVENOUS at 08:04

## 2024-04-23 NOTE — ED TRIAGE NOTES
Pt reports left shoulder pain, left hip pain that radiates down left leg. Generalized itching. Denies n/v/d. Intermittent SOB.

## 2024-04-24 LAB
OHS QRS DURATION: 112 MS
OHS QTC CALCULATION: 483 MS

## 2024-04-24 NOTE — ED PROVIDER NOTES
Encounter Date: 4/23/2024       History     Chief Complaint   Patient presents with    Back Pain     Going down to ankle with swelling, pain from L shoulder down to leg     84-year-old female with multiple comorbidities including HTN, HLD, CHF, CAD, DM2, DVT on Eliquis presents to the ED regarding left shoulder and back pain as well as exertional shortness of breath.  She states the left shoulder and back pain started 2 days ago. Denies trauma.  Denies fever, paresthesias, urinary/bowel dysfunction, or history of IV drug use or cancer.  Denies any current shortness of breath but states she does experienced dyspnea on exertion that started on Saturday. Denies chest pain. Denies fever, nausea, vomiting, or abdominal pain.  She does state she is intermittent ankle swelling.  The other day her left ankle had become swollen which has improved today but still present.  She is compliant with her Eliquis.  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    The history is provided by the patient and medical records.     Review of patient's allergies indicates:   Allergen Reactions    Crestor [rosuvastatin]      Cramping in legs    Ezetimibe Diarrhea     Other reaction(s): abdominal pain, Diarrhea      Hydrocodone Itching    Lisinopril      Other reaction(s): cough      Sulfa (sulfonamide antibiotics) Itching and Rash           Sulfamethoxazole-trimethoprim     Valsartan Swelling     Past Medical History:   Diagnosis Date    Abnormality of lung 11/08/2011    Stable bandlike opacities at the lung bases, most likely representing      Anxiety     Arthritis     CAD (coronary artery disease)     Calculus of ureter 2/22/2011    CHF (congestive heart failure)     Chronic back pain 7/29/2012    Colon polyp 9/2010; 11/2010    Colon polyps 7/29/2012    Coronary artery disease involving native coronary artery of native heart with angina pectoris     Depression     Diabetes mellitus      Diabetes mellitus type II     Diverticulitis large intestine 7/27/2015    Diverticulitis of large intestine without perforation or abscess without bleeding     Diverticulosis 09/25/2010; 11/02/2010; 11/08/2011; 7/29/2012    Duodenal disorder 08/25/2011    Duodenal erosion noted on EGD.    Duodenal ulcer, unspecified as acute or chronic, without hemorrhage, perforation, or obstruction 8/24/2011    E. coli sepsis 12/2010    Due to left ureteral stone with left nephrostomy tube - hospitalized in [place]    Esophageal dysmotility 01/24/2012    Noted on upper GI-barium swallow.    Facial weakness 1969    Left facial weakness s/p left mastoidectomy in ~ 1969.    Fatty liver 11/08/2011    Reported on CT-abdomen and in 06/2012 Gastro clinic visit note.    Gastric polyp 09/29/2010    GERD (gastroesophageal reflux disease) 7/29/2012    Hepatomegaly 11/08/2011    Reported on CT-abdomen    Herpes zoster with other nervous system complications(053.19) 2/28/2011    Hiatal hernia 06/26/2006; 09/29/2010; 08/25/2011    Noted on barium swallow 2006; noted on  EGD 2011.    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    Trouble in sleeping     Type II or unspecified type diabetes mellitus with neurological  manifestations, not stated as uncontrolled(250.60)     Type II or unspecified type diabetes mellitus with peripheral circulatory disorders, not stated as uncontrolled(250.70)     Type II or unspecified type diabetes mellitus with renal manifestations, not stated as uncontrolled(250.40)      Past Surgical History:   Procedure Laterality Date    BELPHAROPTOSIS REPAIR      s/p LAMBERTO levator repair - Dr. Dejesus    CARDIAC CATHETERIZATION      CARPAL TUNNEL RELEASE  3/13/2012    CATARACT EXTRACTION W/  INTRAOCULAR LENS IMPLANT Right 10/31/2018        CATARACT EXTRACTION W/  INTRAOCULAR LENS IMPLANT Left 03/22/2018        CHOLECYSTECTOMY      COLONOSCOPY N/A 11/16/2016    Procedure: COLONOSCOPY;  Surgeon: Chris Storey MD;  Location: [facility] ENDO (4TH FLR);  Service: Endoscopy;  Laterality: N/A;    COLONOSCOPY N/A 6/14/2017    Procedure: COLONOSCOPY;  Surgeon: Chris Storey MD;  Location: [facility] ENDO (4TH FLR);  Service: Endoscopy;  Laterality: N/A;  colonoscopy in 3 months with better bowel prep. - Split PEG prep ordered    CYSTOSCOPY  4/19/2023    Procedure: CYSTOSCOPY;  Surgeon: Domo Bates MD;  [facility];  Service: Urology;;    CYSTOSCOPY N/A 5/12/2023    Procedure: CYSTOSCOPY;  Surgeon: Laz Rudolph Jr., MD;  [facility];  Service: Urology;  Laterality: N/A;    CYSTOSCOPY N/A 7/7/2023    Procedure: CYSTOSCOPY;  Surgeon: Laz Rudolph Jr., MD;  [facility];  Service: Urology;  Laterality: N/A;    ESOPHAGOGASTRODUODENOSCOPY N/A 11/10/2021    Procedure: EGD (ESOPHAGOGASTRODUODENOSCOPY);  Surgeon: Kuldeep Velázquez MD;  Location: [facility] ENDO (4TH FLR);  Service: Endoscopy;  Laterality: N/A;  11/4-eliquis hold ok see te -tb-fully vacc-inst mail and verbal-    EXTRACORPOREAL SHOCK WAVE LITHOTRIPSY      EXTRACTION - STONE Right 5/12/2023    Procedure: EXTRACTION - STONE;  Surgeon: Laz Rudolph Jr., MD;  [facility];  Service: Urology;   Laterality: Right;    INJECTION OF ANESTHETIC AGENT AROUND NERVE Bilateral 6/24/2020    Procedure: BLOCK, NERVE, C4-C5-C6 MEDIAL BRANCH ok per [place] to add on;  Surgeon: Oscar Ravi MD;  Location: [facility] PAIN MGT;  Service: Pain Management;  Laterality: Bilateral;    INTRAOCULAR PROSTHESES INSERTION Right 10/31/2018    Procedure: INSERTION-INTRAOCULAR LENS (IOL);  Surgeon: Keysha Valle MD;  Location: [facility] OR;  Service: Ophthalmology;  Laterality: Right;    JOINT REPLACEMENT      LASER LITHOTRIPSY Right 5/12/2023    Procedure: LITHOTRIPSY, USING LASER;  Surgeon: Laz Rudolph Jr., MD;  Location: [facility] OR 1ST FLR;  Service: Urology;  Laterality: Right;    LASER LITHOTRIPSY Right 7/7/2023    Procedure: LITHOTRIPSY, USING LASER;  Surgeon: Laz Rudolph Jr., MD;  [facility];  Service: Urology;  Laterality: Right;    mastoid tumor removal  1969    Left mastoidectomy with residual left facial weakness.    NEPHROSTOMY      OOPHORECTOMY      PHACOEMULSIFICATION OF CATARACT Right 10/31/2018    Procedure: PHACOEMULSIFICATION-ASPIRATION-CATARACT;  Surgeon: Keysha Valle MD;  Location: [facility] OR;  Service: Ophthalmology;  Laterality: Right;    PYELOSCOPY Right 5/12/2023    Procedure: PYELOSCOPY;  Surgeon: Laz Rudolph Jr., MD;  [facility];  Service: Urology;  Laterality: Right;    PYELOSCOPY Right 7/7/2023    Procedure: PYELOSCOPY;  Surgeon: Laz Rudolph Jr., MD;  [facility];  Service: Urology;  Laterality: Right;    REMOVAL, STENT, URETER Right 5/12/2023    Procedure: REMOVAL, STENT, URETER;  Surgeon: Laz Rudolph Jr., MD;  Location: [facility] OR 1ST FLR;  Service: Urology;  Laterality: Right;    REMOVAL, STENT, URETER Right 7/7/2023    Procedure: REMOVAL, STENT, URETER;  Surgeon: Laz Rudolph Jr., MD;  Location: [facility] OR 1ST FLR;  Service: Urology;  Laterality: Right;    RETROGRADE PYELOGRAPHY Right 4/19/2023    Procedure: PYELOGRAM, RETROGRADE;  Surgeon: Domo  JAME Bates MD;  [facility];  Service: Urology;  Laterality: Right;    TOTAL ABDOMINAL HYSTERECTOMY  1969    TAHBSO    TOTAL KNEE ARTHROPLASTY  9/13/2012    Left    TRANSFORAMINAL EPIDURAL INJECTION OF STEROID Bilateral 9/17/2019    Procedure: INJECTION, STEROID, EPIDURAL, TRANSFORAMINAL APPROACH;  Surgeon: Oscar Ravi MD;  Location: [facility] PAIN MGT;  Service: Pain Management;  Laterality: Bilateral;  B/L TFESI L4/L5    TRANSFORAMINAL EPIDURAL INJECTION OF STEROID Bilateral 10/2/2020    Procedure: INJECTION, STEROID, EPIDURAL, TRANSFORAMINAL APPROACH;  Surgeon: Oscar Ravi MD;  Location: [facility] PAIN MGT;  Service: Pain Management;  Laterality: Bilateral;  B/L TFESI L4/5    TRANSFORAMINAL EPIDURAL INJECTION OF STEROID Bilateral 5/7/2021    Procedure: INJECTION, STEROID, EPIDURAL, TRANSFORAMINAL APPROACH, L4-L5 clear to hold Eliquis 3 days;  Surgeon: Oscar Ravi MD;  Location: [facility] PAIN MGT;  Service: Pain Management;  Laterality: Bilateral;    URETERAL STENT PLACEMENT Right 4/19/2023    Procedure: INSERTION, STENT, URETER;  Surgeon: Domo Bates MD;  [facility];  Service: Urology;  Laterality: Right;    URETERAL STENT PLACEMENT N/A 5/12/2023    Procedure: INSERTION, STENT, URETER;  Surgeon: Laz Rudolph Jr., MD;  [facility];  Service: Urology;  Laterality: N/A;    URETERAL STENT PLACEMENT Right 7/7/2023    Procedure: INSERTION, STENT, URETER;  Surgeon: Laz Rudolph Jr., MD;  [facility];  Service: Urology;  Laterality: Right;    URETEROSCOPY Right 5/12/2023    Procedure: URETEROSCOPY;  Surgeon: Laz Rudolph Jr., MD;  [facility];  Service: Urology;  Laterality: Right;    URETEROSCOPY Right 7/7/2023    Procedure: URETEROSCOPY;  Surgeon: Laz Rudolph Jr., MD;  [facility];  Service: Urology;  Laterality: Right;     Family History   Problem Relation Name Age of Onset    Sleep apnea Sister Coco     Diabetes  Sister Coco     Cancer Mother          brain tumor    Hypertension Mother      Heart disease Father      Heart attack Father      Dementia Brother Abdulaziz     Lupus Brother Abdulaziz     Frontotemporal dementia Brother Abdulaziz     No Known Problems Daughter Corie     No Known Problems Son Calvin     Diabetes Sister Cristy     Lupus Sister Cristy     Breast cancer Sister Cristy     Diabetes Sister Geo Mahsa     Cancer Sister Kenzie         breast cancer    Heart attack Sister Sarah     Diabetes Sister Coco     Liver cancer Brother Wayne     Drug abuse Brother Wayne     Alcohol abuse Brother Wayne     Cirrhosis Brother Hamilton     Drug abuse Brother Hamilton     No Known Problems Daughter April     No Known Problems Son Akin     No Known Problems Son Raz     No Known Problems Maternal Grandmother      No Known Problems Maternal Grandfather      No Known Problems Paternal Grandmother      No Known Problems Paternal Grandfather      Diabetes Brother Al     Dementia Brother Al     Diabetes Other      Ovarian cancer Other          Niece     Breast cancer Maternal Aunt      No Known Problems Maternal Uncle      No Known Problems Paternal Aunt      No Known Problems Paternal Uncle      Glaucoma Neg Hx      Blindness Neg Hx      Celiac disease Neg Hx      Colon cancer Neg Hx      Colon polyps Neg Hx      Esophageal cancer Neg Hx      Inflammatory bowel disease Neg Hx      Liver disease Neg Hx      Rectal cancer Neg Hx      Stomach cancer Neg Hx      Ulcerative colitis Neg Hx      Melanoma Neg Hx      Multiple sclerosis Neg Hx      Psoriasis Neg Hx      Amblyopia Neg Hx      Cataracts Neg Hx      Macular degeneration Neg Hx      Retinal detachment Neg Hx      Strabismus Neg Hx      Stroke Neg Hx      Thyroid disease Neg Hx       Social History     Tobacco Use    Smoking status: Former     Current packs/day: 0.00     Average packs/day: 1 pack/day for 15.0 years (15.0 ttl pk-yrs)     Types: Cigarettes     Start date: 7/24/1985      Quit date: 2000     Years since quittin.7    Smokeless tobacco: Never   Substance Use Topics    Alcohol use: No    Drug use: No     Review of Systems   Reason unable to perform ROS: See HPI.       Physical Exam     Initial Vitals [24 1737]   BP Pulse Resp Temp SpO2   120/66 87 20 97.3 °F (36.3 °C) 98 %      MAP       --         Physical Exam    Vitals reviewed.  Constitutional: She appears well-developed and well-nourished. She is not diaphoretic. No distress.   HENT:   Head: Normocephalic and atraumatic.   Cranial nerve 7 deficit at baseline   Cardiovascular:  Normal rate, regular rhythm and normal heart sounds.     Exam reveals no gallop and no friction rub.       No murmur heard.  Pulmonary/Chest: Breath sounds normal. No respiratory distress. She has no wheezes. She has no rhonchi. She has no rales. She exhibits no tenderness.   Musculoskeletal:      Left shoulder: No swelling or bony tenderness. Normal range of motion. Normal strength.      Cervical back: No tenderness or bony tenderness. Normal range of motion.      Thoracic back: No tenderness or bony tenderness. Normal range of motion.      Lumbar back: Bony tenderness present. No tenderness. Normal range of motion.     Neurological: She has normal strength. No sensory deficit. Gait normal.         ED Course   Procedures  Labs Reviewed   URINALYSIS, REFLEX TO URINE CULTURE - Abnormal; Notable for the following components:       Result Value    Appearance, UA Hazy (*)     Leukocytes, UA Trace (*)     All other components within normal limits    Narrative:     Specimen Source->Urine   CBC W/ AUTO DIFFERENTIAL - Abnormal; Notable for the following components:    MCHC 31.9 (*)     RDW 15.5 (*)     All other components within normal limits   COMPREHENSIVE METABOLIC PANEL - Abnormal; Notable for the following components:    Glucose 131 (*)     eGFR 37.1 (*)     All other components within normal limits   B-TYPE NATRIURETIC PEPTIDE - Abnormal;  Notable for the following components:     (*)     All other components within normal limits   URINALYSIS MICROSCOPIC - Abnormal; Notable for the following components:    WBC, UA 13 (*)     Hyaline Casts, UA 11 (*)     All other components within normal limits    Narrative:     Specimen Source->Urine   CULTURE, URINE   HIV 1 / 2 ANTIBODY    Narrative:     Release to patient->Immediate   HEPATITIS C ANTIBODY    Narrative:     Release to patient->Immediate   TROPONIN I          Imaging Results              CTA Chest Abdomen Pelvis (Final result)  Result time 04/23/24 21:58:17      Final result by Román Case MD (04/23/24 21:58:17)                   Impression:      No evidence of aortic dissection.    Nonobstructing right-sided renal stones.  Bilateral renal cortical scarring.    Colonic diverticulosis.    Incidental findings discussed in the body of the report.      Electronically signed by: Román Case MD  Date:    04/23/2024  Time:    21:58               Narrative:    EXAMINATION:  CTA CHEST ABDOMEN PELVIS    CLINICAL HISTORY:  Aortic dissection suspected;    TECHNIQUE:  CT images were acquired of the chest, abdomen and pelvis after the administration of 100 mL Omnipaque 350 intravenous contrast. Precontrast images were also obtained of the chest. Coronal and sagittal reconstructions were performed, including MIP reconstructions.  No oral contrast was administered.  MIP images also obtained.    COMPARISON:  CT abdomen pelvis, 04/18/2023.  CTA chest, 12/15/2020.    FINDINGS:  ARTERIAL SYSTEM:    Thoracic aorta is normal in course and caliber with moderate calcific atherosclerosis.  There is a normal three-vessel branching pattern from the aortic arch.  Left vertebral artery not well delineated, similar to prior CTA chest in 2020.  Coronary artery calcifications noted.  Abdominal aorta is normal in course and caliber with moderate calcified and noncalcified atherosclerosis.  Celiac axis, superior  mesenteric, and inferior mesenteric arteries are patent.  Mild stenosis at the origin of the SMA.  Bilateral renal arteries are grossly patent with mild intermittent stenosis.    CHEST:    1 cm nodule in the right lobe of the thyroid, too small for dedicated follow-up.  Heart size is normal. Bibasilar subsegmental atelectasis versus scarring.  No consolidation.  No pleural effusion or pneumothorax. No large or central pulmonary embolus.  No mediastinal, hilar, or axillary adenopathy.  Precontrast images of the chest demonstrate no evidence of intramural hematoma.    ABDOMEN:    The liver is normal in size and contour. Gallbladder is surgically absent.  No intra-or extrahepatic biliary ductal dilatation.    Spleen, adrenals, pancreas, and kidneys are negative for acute finding.  Nonobstructing stones in the lower pole of the right kidney measuring up to 7 mm in size.  Probable mild bilateral renal cortical scarring.  No hydronephrosis.  Subcentimeter renal hypodensities, too small to definitively characterize.    No distended loops of small bowel.  Extensive colonic diverticulosis.  Normal appendix.  No convincing findings of acute diverticulitis.    No bulky retroperitoneal lymphadenopathy.    No free fluid or pneumoperitoneum.    PELVIS:    Uterus is surgically absent.  Urinary bladder is decompressed and not well evaluated.  Rectum is unremarkable.  No significant pelvic free fluid.    BONES AND SOFT TISSUES:    Mild fat stranding in the right lower quadrant abdominal wall, potentially related to medical injection.  No aggressive osseous lesion.  Degenerative changes in the spine.                                       X-Ray Shoulder Trauma Left (Final result)  Result time 04/23/24 20:35:16      Final result by David Larsen MD (04/23/24 20:35:16)                   Impression:      1. No convincing acute displaced fracture or dislocation of the left shoulder noting degenerative changes.      Electronically  signed by: David Larsen MD  Date:    04/23/2024  Time:    20:35               Narrative:    EXAMINATION:  XR SHOULDER TRAUMA 3 VIEW LEFT    CLINICAL HISTORY:  Pain in left shoulder    TECHNIQUE:  Three views of the left shoulder were performed.    COMPARISON  05/12/2006    FINDINGS:  Three views left shoulder.    The left humeral head maintains appropriate relationship with the glenoid noting glenohumeral degenerative change.  There are degenerative changes of the acromioclavicular joint.  No acute displaced left rib fracture.  There are changes of calcific tendinosis.                                       Medications   iohexoL (OMNIPAQUE 350) injection 100 mL (100 mLs Intravenous Given 4/23/24 2056)   acetaminophen tablet 1,000 mg (1,000 mg Oral Given 4/23/24 2326)     Medical Decision Making  Amount and/or Complexity of Data Reviewed  Labs: ordered. Decision-making details documented in ED Course.  Radiology: ordered. Decision-making details documented in ED Course.    Risk  OTC drugs.  Prescription drug management.         APC / Resident Notes:   Emergent evaluation 84-year-old female presenting with left shoulder and back pain as well as dyspnea on exertion.  Vitals WNL.  See physical exam findings above.  Given complaints, concerned for atypical presentation of aortic dissection.  Also concerned for possible PE despite being on Eliquis. Will obtain CTA chest abdomen and pelvis. Will also obtain troponin to assess for ACS and BNP for CHF.    My differential diagnoses include but are not limited to:   Compression fracture, sciatica, DDD, muscle strain, osteoarthritis, ACS, aortic dissection, PE    See ED course.  I have reviewed the patient's records and discussed with my supervising physician.        ED Course as of 04/24/24 0107   Tue Apr 23, 2024 2001 CBC auto differential(!)  No leukocytosis or anemia [KB]   2058 Comprehensive metabolic panel(!)  Grossly unremarkable.  Kidney function relatively at  baseline.  No electrolyte abnormality [KB]   2058 Troponin I: <0.006 [KB]   2058 BNP(!): 127  Has history of CHF.  No acute exacerbation today [KB]   2228 Squam Epithel, UA: 15  UA contaminated [KB]   2228 X-Ray Shoulder Trauma Left  Impression:     1. No convincing acute displaced fracture or dislocation of the left shoulder noting degenerative changes. [KB]   2229 CTA Chest Abdomen Pelvis  Impression:     No evidence of aortic dissection.     Nonobstructing right-sided renal stones.  Bilateral renal cortical scarring.     Colonic diverticulosis. [KB]   2339 Patient educated on findings. Reports improvement in symptoms. Advised to closely follow up with cardiologist as well as PCP.  Strict ED return precautions given with all questions answered. Patient verbalized understanding and agreed to plan. [KB]      ED Course User Index  [KB] Abril Chiu PA-C                           Clinical Impression:  Final diagnoses:  [R06.02] Shortness of breath  [M25.512] Left shoulder pain (Primary)  [E04.1] Thyroid nodule  [M54.50] Acute left-sided low back pain without sciatica          ED Disposition Condition    Discharge Stable          ED Prescriptions    None       Follow-up Information       Follow up With Specialties Details Why Contact Info    Jeannine Arriaga MD Internal Medicine Schedule an appointment as soon as possible for a visit   [address]  [place] [address]  [phone]      Venkatesh Trevino MD Cardiology Schedule an appointment as soon as possible for a visit   [address] [facility] [address]  [phone]      [facility] - Emergency Dept Emergency Medicine Go to  If symptoms worsen [address] [facility] [id number] [phone]             Abril Chiu PA-C  04/24/24 0105

## 2024-04-25 ENCOUNTER — PATIENT OUTREACH (OUTPATIENT)
Dept: EMERGENCY MEDICINE | Facility: HOSPITAL | Age: 84
End: 2024-04-25
Payer: MEDICARE

## 2024-04-25 LAB
BACTERIA UR CULT: NORMAL
BACTERIA UR CULT: NORMAL

## 2024-04-25 NOTE — PROGRESS NOTES
Patient visited the ED on 4/23/2024.  Patient declined sooner PCP appointment.  Patient's PCP, Margaret Cabrera PA-C, appointment is scheduled for 5/9/2024 at 1 PM.  In-basket message sent to cardiology provider for a sooner appointment.

## 2024-05-01 ENCOUNTER — TELEPHONE (OUTPATIENT)
Dept: INTERNAL MEDICINE | Facility: CLINIC | Age: 84
End: 2024-05-01
Payer: MEDICARE

## 2024-05-01 NOTE — TELEPHONE ENCOUNTER
----- Message from June Arrieta sent at 5/1/2024 12:20 PM CDT -----  Contact: [phone] Patient  Patient would like to get medical advice.  Symptoms (please be specific):   Pt states she is not feeling better since she was seen in the ER. Pt states she is having swelling in both feet, ankles and legs, itching and back pain when she walks. Pt states Dr Arriaga advised her to call the office if she was not feeling better.   How long have you had these symptoms: on going  Would you like a call back, or a response through your [facility] portal?:   call back   Pharmacy name and phone # (copy from chart):     [facility]  [address]  [place] [address]  Phone: [phone] Fax: [phone]       Comments:

## 2024-05-01 NOTE — TELEPHONE ENCOUNTER
Care Due:                  Date            Visit Type   Department     Provider  --------------------------------------------------------------------------------                                EP -                              PRIMARY      [facility] INTERNAL  Last Visit: 04-      CARE ([facility])   MEDICINE       FEDERICO MARCIAL                              EP -                              PRIMARY      [facility] INTERNAL  Next Visit: 06-      CARE ([facility])   MEDICINE       FEDERICO MARCIAL                                                            Last  Test          Frequency    Reason                     Performed    Due Date  --------------------------------------------------------------------------------    HBA1C.......  6 months...  dulaglutide..............  01- 07-    [facility] Embedded Care Due Messages. Reference number: [id number].   5/01/2024 4:12:26 PM CDT

## 2024-05-01 NOTE — TELEPHONE ENCOUNTER
Called and spoke to pt and she sd she can hardly walk at all due to the swelling   She is cgoing to the ED and I have advised Dr Arriaga

## 2024-05-02 RX ORDER — OXYCODONE AND ACETAMINOPHEN 10; 325 MG/1; MG/1
1 TABLET ORAL
Qty: 150 TABLET | Refills: 0 | Status: SHIPPED | OUTPATIENT
Start: 2024-05-02 | End: 2024-06-03 | Stop reason: SDUPTHER

## 2024-05-09 ENCOUNTER — HOSPITAL ENCOUNTER (OUTPATIENT)
Dept: RADIOLOGY | Facility: HOSPITAL | Age: 84
Discharge: HOME | End: 2024-05-09
Attending: PHYSICIAN ASSISTANT
Payer: MEDICARE

## 2024-05-09 ENCOUNTER — OFFICE VISIT (OUTPATIENT)
Dept: INTERNAL MEDICINE | Facility: CLINIC | Age: 84
End: 2024-05-09
Payer: MEDICARE

## 2024-05-09 VITALS
SYSTOLIC BLOOD PRESSURE: 146 MMHG | HEART RATE: 85 BPM | DIASTOLIC BLOOD PRESSURE: 76 MMHG | WEIGHT: 209.88 LBS | BODY MASS INDEX: 31.81 KG/M2 | HEIGHT: 68 IN | OXYGEN SATURATION: 99 %

## 2024-05-09 VITALS — WEIGHT: 210 LBS | BODY MASS INDEX: 31.93 KG/M2

## 2024-05-09 DIAGNOSIS — R06.02 SHORTNESS OF BREATH: ICD-10-CM

## 2024-05-09 DIAGNOSIS — Z87.898 HISTORY OF WHEEZING: ICD-10-CM

## 2024-05-09 DIAGNOSIS — Z79.4 TYPE 2 DIABETES MELLITUS WITHOUT COMPLICATION, WITH LONG-TERM CURRENT USE OF INSULIN: ICD-10-CM

## 2024-05-09 DIAGNOSIS — R06.02 SHORTNESS OF BREATH: Primary | ICD-10-CM

## 2024-05-09 DIAGNOSIS — E11.9 TYPE 2 DIABETES MELLITUS WITHOUT COMPLICATION, WITH LONG-TERM CURRENT USE OF INSULIN: ICD-10-CM

## 2024-05-09 DIAGNOSIS — Z12.31 SCREENING MAMMOGRAM, ENCOUNTER FOR: ICD-10-CM

## 2024-05-09 PROCEDURE — 77063 BREAST TOMOSYNTHESIS BI: CPT | Mod: 26,HCNC,, | Performed by: RADIOLOGY

## 2024-05-09 PROCEDURE — 1125F AMNT PAIN NOTED PAIN PRSNT: CPT | Mod: HCNC,CPTII,S$GLB, | Performed by: PHYSICIAN ASSISTANT

## 2024-05-09 PROCEDURE — 3078F DIAST BP <80 MM HG: CPT | Mod: HCNC,CPTII,S$GLB, | Performed by: PHYSICIAN ASSISTANT

## 2024-05-09 PROCEDURE — 1101F PT FALLS ASSESS-DOCD LE1/YR: CPT | Mod: HCNC,CPTII,S$GLB, | Performed by: PHYSICIAN ASSISTANT

## 2024-05-09 PROCEDURE — 1160F RVW MEDS BY RX/DR IN RCRD: CPT | Mod: HCNC,CPTII,S$GLB, | Performed by: PHYSICIAN ASSISTANT

## 2024-05-09 PROCEDURE — 71046 X-RAY EXAM CHEST 2 VIEWS: CPT | Mod: 26,HCNC,, | Performed by: INTERNAL MEDICINE

## 2024-05-09 PROCEDURE — 99214 OFFICE O/P EST MOD 30 MIN: CPT | Mod: HCNC,S$GLB,, | Performed by: PHYSICIAN ASSISTANT

## 2024-05-09 PROCEDURE — 3288F FALL RISK ASSESSMENT DOCD: CPT | Mod: HCNC,CPTII,S$GLB, | Performed by: PHYSICIAN ASSISTANT

## 2024-05-09 PROCEDURE — 71046 X-RAY EXAM CHEST 2 VIEWS: CPT | Mod: TC,HCNC

## 2024-05-09 PROCEDURE — 3077F SYST BP >= 140 MM HG: CPT | Mod: HCNC,CPTII,S$GLB, | Performed by: PHYSICIAN ASSISTANT

## 2024-05-09 PROCEDURE — 77067 SCR MAMMO BI INCL CAD: CPT | Mod: TC,HCNC

## 2024-05-09 PROCEDURE — 99999 PR PBB SHADOW E&M-EST. PATIENT-LVL V: CPT | Mod: PBBFAC,HCNC,, | Performed by: PHYSICIAN ASSISTANT

## 2024-05-09 PROCEDURE — 1159F MED LIST DOCD IN RCRD: CPT | Mod: HCNC,CPTII,S$GLB, | Performed by: PHYSICIAN ASSISTANT

## 2024-05-09 PROCEDURE — 77067 SCR MAMMO BI INCL CAD: CPT | Mod: 26,HCNC,, | Performed by: RADIOLOGY

## 2024-05-09 RX ORDER — ALBUTEROL SULFATE 90 UG/1
AEROSOL, METERED RESPIRATORY (INHALATION)
Qty: 25.5 G | Refills: 3 | Status: SHIPPED | OUTPATIENT
Start: 2024-05-09

## 2024-05-09 NOTE — PROGRESS NOTES
Subjective:       Patient ID: Henrietta Villasenor is a 84 y.o. female.        Chief Complaint: Follow-up    Henrietta Villasenor is an established patient of Jeannine Arriaga MD here today for follow up visit.     Shortness of breath, all day, worsened by exertion, for a few weeks.  Also has pain entire left side of body.  Both legs are swelling off/on.  Swelling mostly in left ankle.  She was in the ED 4/23 with no change on EKG, BNP minimally elevated, troponin negative.  CTA fine.  Continues to feel short of breath.  Needs cardiology f/u.  PET stress 1/2022.  Echo 9/2023.     DM -    Trulicity 0.75 mg/wee  Lantus 24 U daily  Novolog 10 U with meals, hold for blood sugar <100  Needs batteries for glucometer so has not been checking blood sugar     Lab Results       Component                Value               Date                       HGBA1C                   7.2 (H)             01/31/2024                 HGBA1C                   6.8 (H)             12/06/2023                 HGBA1C                   6.4 (H)             09/08/2023               HTN -   Carvedilol 25 mg BID  Amlodipine 10 mg daily     Heart failure, CAD    H/o intermittent cough, not currently, h/o thrush tx with diflucan     Chronic pain due to OA of neck and lumbar spine tx with oxycodone, also on cymbalta 60 mg BID     RLS tx with requip     She had a right ureteroscopy with laser lithotripsy and stone basketing on 5/12/23     GERD tx with protonix 40 mg     Lipid tx with lipitor 40 mg    H/o DVT tx with eliquis        Review of Systems   Constitutional:  Negative for chills, diaphoresis, fatigue and fever.   HENT:  Negative for congestion and sore throat.    Eyes:  Negative for visual disturbance.   Respiratory:  Positive for shortness of breath. Negative for cough and chest tightness.    Cardiovascular:  Negative for chest pain, palpitations and leg swelling.   Gastrointestinal:  Negative for abdominal pain, blood in stool, constipation, diarrhea,  nausea and vomiting.   Genitourinary:  Negative for dysuria, frequency, hematuria and urgency.   Musculoskeletal:  Negative for arthralgias and back pain.   Skin:  Negative for rash.   Neurological:  Negative for dizziness, syncope, weakness and headaches.   Psychiatric/Behavioral:  Negative for dysphoric mood and sleep disturbance. The patient is not nervous/anxious.        Objective:      Physical Exam  Vitals and nursing note reviewed.   Constitutional:       Appearance: Normal appearance. She is well-developed.   HENT:      Head: Normocephalic.      Right Ear: External ear normal.      Left Ear: External ear normal.   Eyes:      Pupils: Pupils are equal, round, and reactive to light.   Cardiovascular:      Rate and Rhythm: Normal rate and regular rhythm.      Heart sounds: Normal heart sounds. No murmur heard.     No friction rub. No gallop.   Pulmonary:      Effort: Pulmonary effort is normal. No respiratory distress.      Breath sounds: Normal breath sounds.   Abdominal:      Palpations: Abdomen is soft.      Tenderness: There is no abdominal tenderness.   Skin:     General: Skin is warm and dry.   Neurological:      Mental Status: She is alert.         Assessment:       1. Shortness of breath    2. History of wheezing    3. Type 2 diabetes mellitus without complication, with long-term current use of insulin        Plan:       Henrietta was seen today for follow-up.    Diagnoses and all orders for this visit:    Shortness of breath - to see cardiology tomorrow, lab work and CXR today, ED prompts reviewed, ED work up reviewed  -     Ambulatory referral/consult to Cardiology; Future  -     X-Ray Chest PA And Lateral; Future  -     CBC Auto Differential; Future  -     Comprehensive Metabolic Panel; Future  -     BNP; Future    History of wheezing - no wheezing currently but needs refill of albuterol inhaler  -     albuterol (PROVENTIL/VENTOLIN HFA) 90 mcg/actuation inhaler; Inhale 2 puffs by mouth into the lungs  "every 4 (four) hours as needed for Wheezing. Rescue    Type 2 diabetes mellitus without complication, with long-term current use of insulin - get new batteries for glucometer  -     Hemoglobin A1C; Future  Lab Results   Component Value Date    HGBA1C 7.2 (H) 01/31/2024    HGBA1C 6.8 (H) 12/06/2023    HGBA1C 6.4 (H) 09/08/2023     Pt has been given instructions populated from patient instructions database and has verbalized understanding of the after visit summary and information contained wherein.    Follow up with a primary care provider. May go to ER for acute shortness of breath, lightheadedness, fever, or any other emergent complaints or changes in condition.    "This note will be shared with the patient"    Future Appointments   Date Time Provider Department Center   5/9/2024  2:10 PM LAB, APPOINTMENT [facility] INTMED [facility] [address] [facility]   5/9/2024  2:30 PM [facility] [id number] 485 LB LIMIT [facility] XRAY [address] [facility]   5/10/2024  2:00 PM Ava Tristan PA-C [facility] CARD[address]   5/29/2024 10:30 AM Milad Zapata MD [facility] CARD[address]   6/14/2024  9:00 AM Jeannine Arriaga MD [facility] [address] [facility]   7/18/2024  1:00 PM Margaret Cabrera PA-C [facility] [address] [facility]   8/15/2024  3:00 PM Venkatesh Trevino MD [facility] [address]   8/16/2024  1:00 PM Jeannine Arriaga MD [facility] [address] [facility]                 "

## 2024-05-10 ENCOUNTER — OFFICE VISIT (OUTPATIENT)
Dept: CARDIOLOGY | Facility: CLINIC | Age: 84
End: 2024-05-10
Payer: MEDICARE

## 2024-05-10 VITALS
HEIGHT: 68 IN | WEIGHT: 211.19 LBS | HEART RATE: 88 BPM | OXYGEN SATURATION: 96 % | SYSTOLIC BLOOD PRESSURE: 212 MMHG | DIASTOLIC BLOOD PRESSURE: 100 MMHG | BODY MASS INDEX: 32.01 KG/M2

## 2024-05-10 DIAGNOSIS — I70.0 AORTIC ATHEROSCLEROSIS: ICD-10-CM

## 2024-05-10 DIAGNOSIS — I50.32 CHRONIC DIASTOLIC HEART FAILURE: Primary | ICD-10-CM

## 2024-05-10 DIAGNOSIS — M79.89 LEG SWELLING: ICD-10-CM

## 2024-05-10 DIAGNOSIS — I87.2 VENOUS INSUFFICIENCY OF BOTH LOWER EXTREMITIES: ICD-10-CM

## 2024-05-10 DIAGNOSIS — I25.119 CORONARY ARTERY DISEASE INVOLVING NATIVE CORONARY ARTERY OF NATIVE HEART WITH ANGINA PECTORIS: ICD-10-CM

## 2024-05-10 DIAGNOSIS — E78.2 MIXED HYPERLIPIDEMIA: ICD-10-CM

## 2024-05-10 DIAGNOSIS — I10 ESSENTIAL HYPERTENSION: Chronic | ICD-10-CM

## 2024-05-10 PROCEDURE — 1101F PT FALLS ASSESS-DOCD LE1/YR: CPT | Mod: HCNC,CPTII,S$GLB, | Performed by: PHYSICIAN ASSISTANT

## 2024-05-10 PROCEDURE — 3288F FALL RISK ASSESSMENT DOCD: CPT | Mod: HCNC,CPTII,S$GLB, | Performed by: PHYSICIAN ASSISTANT

## 2024-05-10 PROCEDURE — 1159F MED LIST DOCD IN RCRD: CPT | Mod: HCNC,CPTII,S$GLB, | Performed by: PHYSICIAN ASSISTANT

## 2024-05-10 PROCEDURE — 3080F DIAST BP >= 90 MM HG: CPT | Mod: HCNC,CPTII,S$GLB, | Performed by: PHYSICIAN ASSISTANT

## 2024-05-10 PROCEDURE — 1125F AMNT PAIN NOTED PAIN PRSNT: CPT | Mod: HCNC,CPTII,S$GLB, | Performed by: PHYSICIAN ASSISTANT

## 2024-05-10 PROCEDURE — 99999 PR PBB SHADOW E&M-EST. PATIENT-LVL V: CPT | Mod: PBBFAC,HCNC,, | Performed by: PHYSICIAN ASSISTANT

## 2024-05-10 PROCEDURE — 3077F SYST BP >= 140 MM HG: CPT | Mod: HCNC,CPTII,S$GLB, | Performed by: PHYSICIAN ASSISTANT

## 2024-05-10 PROCEDURE — 99214 OFFICE O/P EST MOD 30 MIN: CPT | Mod: HCNC,S$GLB,, | Performed by: PHYSICIAN ASSISTANT

## 2024-05-10 RX ORDER — FUROSEMIDE 20 MG/1
20 TABLET ORAL EVERY MORNING
Qty: 90 TABLET | Refills: 3 | Status: SHIPPED | OUTPATIENT
Start: 2024-05-10 | End: 2025-05-10

## 2024-05-10 NOTE — PATIENT INSTRUCTIONS
Bring your medications with you to your appointment on 5/29    You can also bring your blood pressure machine with you and we can make sure it's working

## 2024-05-10 NOTE — PROGRESS NOTES
Cardiology Clinic Note  Reason for Visit: Leg swelling     HPI:     PMHx:  HTN  HLD  CAD  HFpEF  DM2  DVT on eliquis  CKD IIIa      Henrietta Villasenor is a 84 y.o. F, who presents for leg swelling and ER follow up.  She was seen in the emergency room April 24th for left shoulder and back pain.  At that time she also had a complaint of exertional shortness of breath.  Her BNP at that time was 127, yesterday was measured at 229.  She is prescribed furosemide 3 days per week, which she reports compliance with, however later she states that she is actually out of this medication.  Her blood pressure is exceptionally elevated at presentation today.  She states that she may not have taken her morning medications.  She reports leg swelling, which is only mild on physical exam today.  She denies shortness of breath, chest pain/pressure or discomfort.  She states that her left shoulder and back pain is improved.    ROS:    Pertinent ROS included in HPI and below.  PMH:     Past Medical History:   Diagnosis Date    Abnormality of lung 11/08/2011    Stable bandlike opacities at the lung bases, most likely representing      Anxiety     Arthritis     CAD (coronary artery disease)     Calculus of ureter 2/22/2011    CHF (congestive heart failure)     Chronic back pain 7/29/2012    Colon polyp 9/2010; 11/2010    Colon polyps 7/29/2012    Coronary artery disease involving native coronary artery of native heart with angina pectoris     Depression     Diabetes mellitus     Diabetes mellitus type II     Diverticulitis large intestine 7/27/2015    Diverticulitis of large intestine without perforation or abscess without bleeding     Diverticulosis 09/25/2010; 11/02/2010; 11/08/2011; 7/29/2012    Duodenal disorder 08/25/2011    Duodenal erosion noted on EGD.    Duodenal ulcer, unspecified as acute or chronic, without hemorrhage, perforation, or obstruction 8/24/2011    E. coli sepsis 12/2010    Due to left ureteral stone with left  nephrostomy tube - hospitalized in [place]    Esophageal dysmotility 01/24/2012    Noted on upper GI-barium swallow.    Facial weakness 1969    Left facial weakness s/p left mastoidectomy in ~ 1969.    Fatty liver 11/08/2011    Reported on CT-abdomen and in 06/2012 Gastro clinic visit note.    Gastric polyp 09/29/2010    GERD (gastroesophageal reflux disease) 7/29/2012    Hepatomegaly 11/08/2011    Reported on CT-abdomen    Herpes zoster with other nervous system complications(053.19) 2/28/2011    Hiatal hernia 06/26/2006; 09/29/2010; 08/25/2011    Noted on barium swallow 2006; noted on  EGD 2011.    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    Trouble in sleeping     Type II or unspecified type diabetes mellitus with neurological manifestations, not stated as uncontrolled(250.60)     Type II or unspecified type diabetes mellitus with peripheral circulatory disorders, not stated as uncontrolled(250.70)     Type II or unspecified type diabetes mellitus with renal manifestations, not stated as uncontrolled(250.40)      Past Surgical History:   Procedure Laterality Date    BELPHAROPTOSIS REPAIR      s/p LAMBERTO levator repair - Dr. Dejesus    CARDIAC CATHETERIZATION      CARPAL TUNNEL RELEASE   3/13/2012    CATARACT EXTRACTION W/  INTRAOCULAR LENS IMPLANT Right 10/31/2018        CATARACT EXTRACTION W/  INTRAOCULAR LENS IMPLANT Left 03/22/2018        CHOLECYSTECTOMY      COLONOSCOPY N/A 11/16/2016    Procedure: COLONOSCOPY;  Surgeon: Chris Storey MD;  Location: [facility] ENDO (4TH FLR);  Service: Endoscopy;  Laterality: N/A;    COLONOSCOPY N/A 6/14/2017    Procedure: COLONOSCOPY;  Surgeon: Chris Storey MD;  Location: [facility] ENDO (4TH FLR);  Service: Endoscopy;  Laterality: N/A;  colonoscopy in 3 months with better bowel prep. - Split PEG prep ordered    CYSTOSCOPY  4/19/2023    Procedure: CYSTOSCOPY;  Surgeon: Domo Bates MD;  [facility];  Service: Urology;;    CYSTOSCOPY N/A 5/12/2023    Procedure: CYSTOSCOPY;  Surgeon: Laz Rudolph Jr., MD;  Location: [facility] OR [address] [place] [address];  Service: Urology;  Laterality: N/A;    CYSTOSCOPY N/A 7/7/2023    Procedure: CYSTOSCOPY;  Surgeon: Laz Rudolph Jr., MD;  Location: [facility] OR [address] [place] [address];  Service: Urology;  Laterality: N/A;    ESOPHAGOGASTRODUODENOSCOPY N/A 11/10/2021    Procedure: EGD (ESOPHAGOGASTRODUODENOSCOPY);  Surgeon: Kuldeep Velázquez MD;  [facility]  Service: Endoscopy;  Laterality: N/A;  11/4-eliquis hold ok see te -tb-fully vacc-inst mail and verbal-    EXTRACORPOREAL SHOCK WAVE LITHOTRIPSY      EXTRACTION - STONE Right 5/12/2023    Procedure: EXTRACTION - STONE;  Surgeon: Laz Rudolph Jr., MD;  Location: [facility] OR [address] [place] [address];  Service: Urology;  Laterality: Right;    INJECTION OF ANESTHETIC AGENT AROUND NERVE Bilateral 6/24/2020    Procedure: BLOCK, NERVE, C4-C5-C6 MEDIAL BRANCH ok per Talia to add on;  Surgeon: Oscar Ravi MD;  Location: [facility] PAIN MGT;  Service: Pain Management;  Laterality: Bilateral;    INTRAOCULAR PROSTHESES INSERTION Right 10/31/2018    Procedure: INSERTION-INTRAOCULAR LENS (IOL);  Surgeon: Keysha Valle MD;  Location: [facility];  Service: Ophthalmology;  Laterality:  Right;    JOINT REPLACEMENT      LASER LITHOTRIPSY Right 5/12/2023    Procedure: LITHOTRIPSY, USING LASER;  Surgeon: aLz Rudolph Jr., MD;  Location: [facility] OR [facility] FLR;  Service: Urology;  Laterality: Right;    LASER LITHOTRIPSY Right 7/7/2023    Procedure: LITHOTRIPSY, USING LASER;  Surgeon: Laz Rudolph Jr., MD;  Location: [facility] OR [facility] FLR;  Service: Urology;  Laterality: Right;    mastoid tumor removal  1969    Left mastoidectomy with residual left facial weakness.    NEPHROSTOMY      OOPHORECTOMY      PHACOEMULSIFICATION OF CATARACT Right 10/31/2018    Procedure: PHACOEMULSIFICATION-ASPIRATION-CATARACT;  Surgeon: Keysha Valle MD;  Location: [facility] OR;  Service: Ophthalmology;  Laterality: Right;    PYELOSCOPY Right 5/12/2023    Procedure: PYELOSCOPY;  Surgeon: Laz Rudolph Jr., MD;  [facility];  Service: Urology;  Laterality: Right;    PYELOSCOPY Right 7/7/2023    Procedure: PYELOSCOPY;  Surgeon: Laz Rudolph Jr., MD;  [facility];  Service: Urology;  Laterality: Right;    REMOVAL, STENT, URETER Right 5/12/2023    Procedure: REMOVAL, STENT, URETER;  Surgeon: Laz Rudolph Jr., MD;  Location: [facility] OR 1ST FLR;  Service: Urology;  Laterality: Right;    REMOVAL, STENT, URETER Right 7/7/2023    Procedure: REMOVAL, STENT, URETER;  Surgeon: Laz Rudolph Jr., MD;  [facility];  Service: Urology;  Laterality: Right;    RETROGRADE PYELOGRAPHY Right 4/19/2023    Procedure: PYELOGRAM, RETROGRADE;  Surgeon: Domo Bates MD;  Location: [facility] OR [facility] FLR;  Service: Urology;  Laterality: Right;    TOTAL ABDOMINAL HYSTERECTOMY  1969    TAHBSO    TOTAL KNEE ARTHROPLASTY  9/13/2012    Left    TRANSFORAMINAL EPIDURAL INJECTION OF STEROID Bilateral 9/17/2019    Procedure: INJECTION, STEROID, EPIDURAL, TRANSFORAMINAL APPROACH;  Surgeon: Oscar Ravi MD;  Location: [facility] PAIN MGT;  Service: Pain Management;  Laterality: Bilateral;  B/L TFESI L4/L5    TRANSFORAMINAL  EPIDURAL INJECTION OF STEROID Bilateral 10/2/2020    Procedure: INJECTION, STEROID, EPIDURAL, TRANSFORAMINAL APPROACH;  Surgeon: Oscar Ravi MD;  Location: [facility] PAIN MGT;  Service: Pain Management;  Laterality: Bilateral;  B/L TFESI L4/5    TRANSFORAMINAL EPIDURAL INJECTION OF STEROID Bilateral 5/7/2021    Procedure: INJECTION, STEROID, EPIDURAL, TRANSFORAMINAL APPROACH, L4-L5 clear to hold Eliquis 3 days;  Surgeon: Oscar Ravi MD;  Location: [facility] PAIN MGT;  Service: Pain Management;  Laterality: Bilateral;    URETERAL STENT PLACEMENT Right 4/19/2023    Procedure: INSERTION, STENT, URETER;  Surgeon: Domo Bates MD;  Location: [facility] OR 1ST FLR;  Service: Urology;  Laterality: Right;    URETERAL STENT PLACEMENT N/A 5/12/2023    Procedure: INSERTION, STENT, URETER;  Surgeon: Laz Rudolph Jr., MD;  Location: [facility] OR 1ST FLR;  Service: Urology;  Laterality: N/A;    URETERAL STENT PLACEMENT Right 7/7/2023    Procedure: INSERTION, STENT, URETER;  Surgeon: Laz Rudolph Jr., MD;  Location: [facility] OR 1ST FLR;  Service: Urology;  Laterality: Right;    URETEROSCOPY Right 5/12/2023    Procedure: URETEROSCOPY;  Surgeon: Laz Rudolph Jr., MD;  Location: [facility] OR 1ST FLR;  Service: Urology;  Laterality: Right;    URETEROSCOPY Right 7/7/2023    Procedure: URETEROSCOPY;  Surgeon: Laz Rudolph Jr., MD;  Location: [facility] OR [facility] FLR;  Service: Urology;  Laterality: Right;     Allergies:     Review of patient's allergies indicates:   Allergen Reactions    Crestor [rosuvastatin]      Cramping in legs    Ezetimibe Diarrhea     Other reaction(s): abdominal pain, Diarrhea      Hydrocodone Itching    Lisinopril      Other reaction(s): cough      Sulfa (sulfonamide antibiotics) Itching and Rash           Sulfamethoxazole-trimethoprim     Valsartan Swelling     Medications:     Current Outpatient Medications on File Prior to Visit   Medication Sig Dispense Refill    ACCU-CHEK GUIDE TEST STRIPS Strp TEST BLOOD SUGAR THREE  "TIMES DAILY 300 strip 3    ACCU-CHEK SOFT DEV LANCETS Kit       albuterol (PROVENTIL/VENTOLIN HFA) 90 mcg/actuation inhaler Inhale 2 puffs by mouth into the lungs every 4 (four) hours as needed for Wheezing. Rescue 25.5 g 3    amLODIPine (NORVASC) 10 MG tablet Take 1 tablet (10 mg total) by mouth once daily. 90 tablet 0    apixaban (ELIQUIS) 5 mg Tab Take 1 tablet (5 mg total) by mouth 2 (two) times daily. 60 tablet 0    atorvastatin (LIPITOR) 40 MG tablet Take 1 tablet (40 mg total) by mouth once daily. 90 tablet 1    BD ULTRA-FINE OLU PEN NEEDLE 32 gauge x 5/32" Ndle Uses 4 times daily, on multiple daily insulin injections 130 each 12    benzonatate (TESSALON) 100 MG capsule Take 1 capsule (100 mg total) by mouth 3 (three) times daily as needed for Cough. 30 capsule 0    carvediloL (COREG) 25 MG tablet Take 1 tablet (25 mg total) by mouth 2 (two) times daily. 180 tablet 1    cholecalciferol, vitamin D3, (VITAMIN D3) 50 mcg (2,000 unit) Cap capsule Take 2,000 Units by mouth once daily.      DROPLET PEN NEEDLE 29 gauge x 1/2" Ndle USE FOUR TIMES DAILY 400 each 3    DROPSAFE ALCOHOL PREP PADS PadM USE TOPICALLY TO TEST BLOOD SUGAR FOUR TIMES DAILY 400 each 3    dulaglutide (TRULICITY) 0.75 mg/0.5 mL pen injector Inject 0.75 mg into the skin every 7 days. 4 pen 2    DULoxetine (CYMBALTA) 60 MG capsule Take 1 capsule (60 mg total) by mouth 2 (two) times daily. Please hold until you follow up with your primary care provider 180 capsule 1    fluticasone propionate (FLONASE) 50 mcg/actuation nasal spray Instill 2 sprays in each nostril once daily. 16 g 1    insulin (LANTUS SOLOSTAR U-100 INSULIN) glargine 100 units/mL SubQ pen Inject 24 Units into the skin once daily.      insulin aspart U-100 (NOVOLOG) 100 unit/mL (3 mL) InPn pen Inject 10 units before meals, hold if sugar is less than 100. Max daily 45 units. 45 mL 3    lancets (ACCU-CHEK SOFTCLIX LANCETS) Misc TEST BLOOD SUGAR FOUR TIMES DAILY 400 each 3    loratadine " (CLARITIN) 10 mg tablet Take 1 tablet (10 mg total) by mouth daily as needed for Allergies. 30 tablet 1    magnesium oxide (MAG-OX) 400 mg (241.3 mg magnesium) tablet Take 1 tablet (400 mg total) by mouth once daily. 100 tablet 4    oxyCODONE-acetaminophen (PERCOCET)  mg per tablet Take 1 tablet by mouth 5 (five) times daily. 150 tablet 0    pantoprazole (PROTONIX) 40 MG tablet Take 1 tablet (40 mg total) by mouth once daily. 90 tablet 1    rOPINIRole (REQUIP) 0.5 MG tablet Take 1 tablet (0.5 mg total) by mouth every evening. 90 tablet 3    [DISCONTINUED] furosemide (LASIX) 20 MG tablet Take 1 tablet by mouth on Monday, Wednesday, and Friday 30 tablet 3    blood-glucose meter kit Use as instructed 1 each 0    olopatadine (PATADAY) 0.2 % Drop Place 1 drop into both eyes once daily. 2.5 mL 0     No current facility-administered medications on file prior to visit.     Social History:     Social History     Tobacco Use    Smoking status: Former     Current packs/day: 0.00     Average packs/day: 1 pack/day for 15.0 years (15.0 ttl pk-yrs)     Types: Cigarettes     Start date: 1985     Quit date: 2000     Years since quittin.8    Smokeless tobacco: Never   Substance Use Topics    Alcohol use: No     Family History:     Family History   Problem Relation Name Age of Onset    Sleep apnea Sister Coco     Diabetes Sister Coco     Cancer Mother          brain tumor    Hypertension Mother      Heart disease Father      Heart attack Father      Dementia Brother Abdulaziz     Lupus Brother Abdulaziz     Frontotemporal dementia Brother Abdulaziz     No Known Problems Daughter Corie     No Known Problems Son Calvin     Diabetes Sister Cristy     Lupus Sister Cristy     Breast cancer Sister Cristy     Diabetes Sister Geo Mahsa     Cancer Sister Kenzie         breast cancer    Heart attack Sister Sarah     Diabetes Sister Coco     Liver cancer Brother Wayne     Drug abuse Brother Wayne     Alcohol abuse Brother Wayne      "Cirrhosis Brother Hamilton     Drug abuse Brother Hamilton     No Known Problems Daughter April     No Known Problems Son Akin     No Known Problems Son Raz     No Known Problems Maternal Grandmother      No Known Problems Maternal Grandfather      No Known Problems Paternal Grandmother      No Known Problems Paternal Grandfather      Diabetes Brother Al     Dementia Brother Al     Diabetes Other      Ovarian cancer Other          Niece     Breast cancer Maternal Aunt      No Known Problems Maternal Uncle      No Known Problems Paternal Aunt      No Known Problems Paternal Uncle      Glaucoma Neg Hx      Blindness Neg Hx      Celiac disease Neg Hx      Colon cancer Neg Hx      Colon polyps Neg Hx      Esophageal cancer Neg Hx      Inflammatory bowel disease Neg Hx      Liver disease Neg Hx      Rectal cancer Neg Hx      Stomach cancer Neg Hx      Ulcerative colitis Neg Hx      Melanoma Neg Hx      Multiple sclerosis Neg Hx      Psoriasis Neg Hx      Amblyopia Neg Hx      Cataracts Neg Hx      Macular degeneration Neg Hx      Retinal detachment Neg Hx      Strabismus Neg Hx      Stroke Neg Hx      Thyroid disease Neg Hx       Physical Exam:   BP (!) 212/100   Pulse 88   Ht 5' 8" (1.727 m)   Wt 95.8 kg (211 lb 3.2 oz)   LMP  (LMP Unknown)   SpO2 96%   BMI 32.11 kg/m²      Physical Exam  Vitals and nursing note reviewed.   Constitutional:       Appearance: Normal appearance.   HENT:      Head: Normocephalic and atraumatic.   Neck:      Vascular: Normal carotid pulses. No carotid bruit or hepatojugular reflux.   Cardiovascular:      Rate and Rhythm: Normal rate and regular rhythm.      Chest Wall: PMI is not displaced.      Pulses:           Carotid pulses are 2+ on the right side and 2+ on the left side.       Radial pulses are 2+ on the right side and 2+ on the left side.        Dorsalis pedis pulses are 2+ on the right side and 2+ on the left side.        Posterior tibial pulses are 2+ on the right side and 2+ " on the left side.      Heart sounds: No murmur heard.  Pulmonary:      Effort: Pulmonary effort is normal.      Breath sounds: Normal breath sounds. No wheezing, rhonchi or rales.   Abdominal:      General: Bowel sounds are normal. There is no abdominal bruit.      Palpations: Abdomen is soft. There is no pulsatile mass.      Tenderness: There is no abdominal tenderness.   Musculoskeletal:      Right lower leg: Edema present.      Left lower leg: Edema present.      Comments: Trace nonpitting edema bilateral lower extremities  Varicosities bilaterally   Feet:      Right foot:      Skin integrity: Skin integrity normal.      Left foot:      Skin integrity: Skin integrity normal.   Skin:     Capillary Refill: Capillary refill takes less than 2 seconds.   Neurological:      General: No focal deficit present.      Mental Status: She is alert.      Cranial Nerves: Facial asymmetry present.      Comments: Facial drop is chronic    Psychiatric:         Mood and Affect: Mood and affect normal.         Speech: Speech normal.         Behavior: Behavior is cooperative.         Thought Content: Thought content normal.          Labs:     Blood Tests:  Lab Results   Component Value Date     (H) 05/09/2024     05/09/2024    K 3.8 05/09/2024     05/09/2024    CO2 26 05/09/2024    BUN 13 05/09/2024    CREATININE 1.3 05/09/2024     (H) 05/09/2024    HGBA1C 7.5 (H) 05/09/2024    MG 1.1 (L) 01/31/2024    AST 20 05/09/2024    ALT 15 05/09/2024    ALBUMIN 3.3 (L) 05/09/2024    PROT 7.6 05/09/2024    BILITOT 0.4 05/09/2024    WBC 8.27 05/09/2024    HGB 11.8 (L) 05/09/2024    HCT 39.5 05/09/2024    HCT 48 04/25/2023    MCV 89 05/09/2024     (L) 05/09/2024    INR 1.1 08/16/2022    TSH 1.433 04/18/2023       Lab Results   Component Value Date    CHOL 156 09/08/2023    HDL 40 09/08/2023    TRIG 87 09/08/2023       Lab Results   Component Value Date    LDLCALC 98.6 09/08/2023         Imaging:      Echocardiogram  TTE 2023    Left Ventricle: The left ventricle is normal in size. Normal wall thickness. There is concentric remodeling. Normal wall motion. There is normal systolic function with a visually estimated ejection fraction of 60 - 65%. There is normal diastolic function.    Right Ventricle: Normal right ventricular cavity size. Wall thickness is normal. Right ventricle wall motion  is normal. Systolic function is normal.    Left Atrium: Left atrium is mildly dilated.    Tricuspid Valve: There is mild regurgitation.    Pulmonary Artery: The estimated pulmonary artery systolic pressure is 35 mmHg.    IVC/SVC: Normal venous pressure at 3 mmHg.    Stress testing  PET Stress Test 2022    The relative PET images show no clinically significant regional resting or stress induced perfusion abnormalities.    The whole heart absolute myocardial perfusion values averaged 1.33 cc/min/g at rest, which is elevated; 1.79 cc/min/g at stress, which is mildly reduced; and CFR is 1.35 , which is moderately reduced in part due to elevated resting flow.    CT attenuation images demonstrate moderate diffuse coronary calcifications in the LAD and LCX territory.    The gated perfusion images showed an ejection fraction of 42% at rest and 56% during stress. A normal ejection fraction is greater than 53%.    The wall motion is normal at rest and during stress.    The LV cavity size is normal at rest and stress.    The EKG portion of this study is negative for ischemia.    During stress, occasional PVCs are noted.    The patient reported no chest pain during the stress test.    Cath Lab  None    Other  None    [id number]  Normal sinus rhythm   Left axis deviation   LVH with repolarization abnormality ( R in aVL , [place] product )   Abnormal ECG   When compared with ECG of 15-SEP-2023 15:05,   No significant change was found     Assessment:     1. Chronic diastolic heart failure    2. Venous insufficiency of both  lower extremities    3. Leg swelling    4. Essential hypertension    5. Coronary artery disease involving native coronary artery of native heart with angina pectoris    6. Aortic atherosclerosis    7. Mixed hyperlipidemia        Plan:     Chronic diastolic heart failure  Minor elevation of BNP recently. The patient denies significant heart failure symptoms today.  I am recommending that her furosemide be increased to daily use, followed by repeat BMP and BNP in 2 weeks.  I have concerns regarding medication compliance and have requested that she bring her medications to follow-up appointment scheduled May 29th with Dr. Zapata.     Venous insufficiency of both lower extremities  Leg swelling  -     furosemide (LASIX) 20 MG tablet; Take 1 tablet (20 mg total) by mouth every morning. Take 1 tablet by mouth on Monday, Wednesday, and Friday  Dispense: 90 tablet; Refill: 3  -     Basic metabolic panel; Future; Expected date: 05/24/2024  Recommend limiting dietary sodium to < 2 g per day  Elevate legs when possible   Compression stockings during the day as tolerable, especially when traveling     Essential hypertension  BP exceptionally elevated in clinic visit today and at all other clinic visits.  Patient states that she has not taken medications today.  Encouraged medication compliance.  Patient states that she has a blood pressure cuff at home.  I have requested that she bring that to her follow-up appointment so that we can ensure it is working properly.  Emphasized the importance of low-sodium diet    Coronary artery disease involving native coronary artery of native heart with angina pectoris  Aortic atherosclerosis  Continue Eliquis and statin    Mixed hyperlipidemia  Continue atorvastatin 40 mg q.d.  Last fasting lipid panel drawn September 8th 2023, LDL 98.6  Consider increase of atorvastatin at follow-up        Signed:  Ava Tristan PA-C  Cardiology     5/10/2024 11:44 AM    Follow-up:     Future  Appointments   Date Time Provider Department Center   5/24/2024  1:00 PM [facility] [address] [facility]   5/29/2024 10:30 AM Milad Zapata MD [facility] CARDIO [address]   6/14/2024  9:00 AM Jeannine Arriaga MD [facility] IM [address] [facility]   7/18/2024  1:00 PM Margaret Cabrera, PA-C [facility] IM [address] PC[facility]   8/15/2024  3:00 PM Venkatesh Trevino MD [facility] CARDIO [address]   8/16/2024  1:00 PM Jeannine Arriaga MD [facility] IM [address] [facility]

## 2024-05-13 ENCOUNTER — TELEPHONE (OUTPATIENT)
Dept: INTERNAL MEDICINE | Facility: CLINIC | Age: 84
End: 2024-05-13
Payer: MEDICARE

## 2024-05-13 NOTE — TELEPHONE ENCOUNTER
----- Message from Margaret Cabrera PA-C sent at 5/13/2024  6:38 AM CDT -----  Mammogram looks fine - let patient know

## 2024-05-24 ENCOUNTER — LAB VISIT (OUTPATIENT)
Dept: LAB | Facility: HOSPITAL | Age: 84
End: 2024-05-24
Payer: MEDICARE

## 2024-05-24 DIAGNOSIS — M79.89 LEG SWELLING: ICD-10-CM

## 2024-05-24 DIAGNOSIS — I50.32 CHRONIC DIASTOLIC HEART FAILURE: ICD-10-CM

## 2024-05-24 LAB
ANION GAP SERPL CALC-SCNC: 11 MMOL/L (ref 8–16)
BNP SERPL-MCNC: 209 PG/ML (ref 0–99)
BUN SERPL-MCNC: 10 MG/DL (ref 8–23)
CALCIUM SERPL-MCNC: 9.9 MG/DL (ref 8.7–10.5)
CHLORIDE SERPL-SCNC: 106 MMOL/L (ref 95–110)
CO2 SERPL-SCNC: 25 MMOL/L (ref 23–29)
CREAT SERPL-MCNC: 1.2 MG/DL (ref 0.5–1.4)
EST. GFR  (NO RACE VARIABLE): 44.6 ML/MIN/1.73 M^2
GLUCOSE SERPL-MCNC: 157 MG/DL (ref 70–110)
POTASSIUM SERPL-SCNC: 4.1 MMOL/L (ref 3.5–5.1)
SODIUM SERPL-SCNC: 142 MMOL/L (ref 136–145)

## 2024-05-24 PROCEDURE — 83880 ASSAY OF NATRIURETIC PEPTIDE: CPT | Mod: HCNC | Performed by: PHYSICIAN ASSISTANT

## 2024-05-24 PROCEDURE — 36415 COLL VENOUS BLD VENIPUNCTURE: CPT | Mod: HCNC | Performed by: PHYSICIAN ASSISTANT

## 2024-05-24 PROCEDURE — 80048 BASIC METABOLIC PNL TOTAL CA: CPT | Mod: HCNC | Performed by: PHYSICIAN ASSISTANT

## 2024-05-27 ENCOUNTER — TELEPHONE (OUTPATIENT)
Dept: CARDIOLOGY | Facility: CLINIC | Age: 84
End: 2024-05-27
Payer: MEDICARE

## 2024-05-27 NOTE — TELEPHONE ENCOUNTER
No answer left vociemail  ----- Message from Ava Tristan PA-C sent at 5/27/2024  9:17 AM CDT -----  Please let the patient know her labs are normal and remind her to bring her medications and BP cuff to her upcoming appointment.     Thanks

## 2024-05-29 ENCOUNTER — OFFICE VISIT (OUTPATIENT)
Dept: CARDIOLOGY | Facility: CLINIC | Age: 84
End: 2024-05-29
Payer: MEDICARE

## 2024-05-29 VITALS
SYSTOLIC BLOOD PRESSURE: 141 MMHG | HEIGHT: 68 IN | DIASTOLIC BLOOD PRESSURE: 90 MMHG | WEIGHT: 212.75 LBS | OXYGEN SATURATION: 97 % | BODY MASS INDEX: 32.24 KG/M2 | HEART RATE: 80 BPM

## 2024-05-29 DIAGNOSIS — I73.9 PAD (PERIPHERAL ARTERY DISEASE): ICD-10-CM

## 2024-05-29 DIAGNOSIS — E78.00 HYPERCHOLESTEROLEMIA: ICD-10-CM

## 2024-05-29 DIAGNOSIS — Z79.4 TYPE 2 DIABETES MELLITUS WITH STAGE 3A CHRONIC KIDNEY DISEASE, WITH LONG-TERM CURRENT USE OF INSULIN: ICD-10-CM

## 2024-05-29 DIAGNOSIS — G47.33 OSA ON CPAP: ICD-10-CM

## 2024-05-29 DIAGNOSIS — I50.32 CHRONIC DIASTOLIC CONGESTIVE HEART FAILURE: ICD-10-CM

## 2024-05-29 DIAGNOSIS — Z79.4 INSULIN LONG-TERM USE: ICD-10-CM

## 2024-05-29 DIAGNOSIS — I25.2 HISTORY OF MI (MYOCARDIAL INFARCTION): ICD-10-CM

## 2024-05-29 DIAGNOSIS — M79.89 LEG SWELLING: ICD-10-CM

## 2024-05-29 DIAGNOSIS — R06.02 SHORTNESS OF BREATH: ICD-10-CM

## 2024-05-29 DIAGNOSIS — E11.22 TYPE 2 DIABETES MELLITUS WITH STAGE 3A CHRONIC KIDNEY DISEASE, WITH LONG-TERM CURRENT USE OF INSULIN: ICD-10-CM

## 2024-05-29 DIAGNOSIS — I50.32 CHRONIC DIASTOLIC HEART FAILURE: Primary | ICD-10-CM

## 2024-05-29 DIAGNOSIS — E66.9 OBESITY (BMI 30.0-34.9): ICD-10-CM

## 2024-05-29 DIAGNOSIS — E78.2 MIXED HYPERLIPIDEMIA: ICD-10-CM

## 2024-05-29 DIAGNOSIS — I82.433 ACUTE DEEP VEIN THROMBOSIS (DVT) OF POPLITEAL VEIN OF BOTH LOWER EXTREMITIES: ICD-10-CM

## 2024-05-29 DIAGNOSIS — I87.2 VENOUS INSUFFICIENCY OF BOTH LOWER EXTREMITIES: ICD-10-CM

## 2024-05-29 DIAGNOSIS — I10 ESSENTIAL HYPERTENSION: ICD-10-CM

## 2024-05-29 DIAGNOSIS — N18.31 TYPE 2 DIABETES MELLITUS WITH STAGE 3A CHRONIC KIDNEY DISEASE, WITH LONG-TERM CURRENT USE OF INSULIN: ICD-10-CM

## 2024-05-29 DIAGNOSIS — I25.119 CORONARY ARTERY DISEASE INVOLVING NATIVE CORONARY ARTERY OF NATIVE HEART WITH ANGINA PECTORIS: ICD-10-CM

## 2024-05-29 DIAGNOSIS — I70.0 AORTIC ATHEROSCLEROSIS: ICD-10-CM

## 2024-05-29 PROCEDURE — 99999 PR PBB SHADOW E&M-EST. PATIENT-LVL V: CPT | Mod: PBBFAC,HCNC,GC, | Performed by: STUDENT IN AN ORGANIZED HEALTH CARE EDUCATION/TRAINING PROGRAM

## 2024-05-29 PROCEDURE — 1160F RVW MEDS BY RX/DR IN RCRD: CPT | Mod: HCNC,CPTII,GC,S$GLB | Performed by: STUDENT IN AN ORGANIZED HEALTH CARE EDUCATION/TRAINING PROGRAM

## 2024-05-29 PROCEDURE — 99214 OFFICE O/P EST MOD 30 MIN: CPT | Mod: HCNC,GC,S$GLB, | Performed by: STUDENT IN AN ORGANIZED HEALTH CARE EDUCATION/TRAINING PROGRAM

## 2024-05-29 PROCEDURE — 3080F DIAST BP >= 90 MM HG: CPT | Mod: HCNC,CPTII,GC,S$GLB | Performed by: STUDENT IN AN ORGANIZED HEALTH CARE EDUCATION/TRAINING PROGRAM

## 2024-05-29 PROCEDURE — 1159F MED LIST DOCD IN RCRD: CPT | Mod: HCNC,CPTII,GC,S$GLB | Performed by: STUDENT IN AN ORGANIZED HEALTH CARE EDUCATION/TRAINING PROGRAM

## 2024-05-29 PROCEDURE — 3077F SYST BP >= 140 MM HG: CPT | Mod: HCNC,CPTII,GC,S$GLB | Performed by: STUDENT IN AN ORGANIZED HEALTH CARE EDUCATION/TRAINING PROGRAM

## 2024-05-29 PROCEDURE — 1126F AMNT PAIN NOTED NONE PRSNT: CPT | Mod: HCNC,CPTII,GC,S$GLB | Performed by: STUDENT IN AN ORGANIZED HEALTH CARE EDUCATION/TRAINING PROGRAM

## 2024-05-29 RX ORDER — DAPAGLIFLOZIN 10 MG/1
10 TABLET, FILM COATED ORAL DAILY
Qty: 90 TABLET | Refills: 3 | Status: SHIPPED | OUTPATIENT
Start: 2024-05-29

## 2024-05-29 NOTE — PROGRESS NOTES
Cardiology Clinic Note  Reason for Visit: HF    HPI:   PMHx:  HTN  HLD  CAD  HFpEF  DM2  DVT on eliquis  CKD IIIa        Henrietta Villasenor is a 84 y.o. F, who presents for follow up.  She has no complaints today.  She is walking much better.  No LE swelling.  Still a bit orthopneic.  Losing weight.  Denies any CP, SOB, Shafer, PND.  Need refills on medications      ROS:    Constitution: Negative for fever, chills, weight loss or gain.   HENT: Negative for sore throat, rhinorrhea, or headache.  Eyes: Negative for blurred or double vision.   Cardiovascular: See above  Pulmonary: Negative for SOB   Gastrointestinal: Negative for abdominal pain, nausea, vomiting, or diarrhea.   : Negative for dysuria.   Neurological: Negative for focal weakness or sensory changes.  PMH:     Past Medical History:   Diagnosis Date    Abnormality of lung 11/08/2011    Stable bandlike opacities at the lung bases, most likely representing      Anxiety     Arthritis     CAD (coronary artery disease)     Calculus of ureter 2/22/2011    CHF (congestive heart failure)     Chronic back pain 7/29/2012    Colon polyp 9/2010; 11/2010    Colon polyps 7/29/2012    Coronary artery disease involving native coronary artery of native heart with angina pectoris     Depression     Diabetes mellitus     Diabetes mellitus type II     Diverticulitis large intestine 7/27/2015    Diverticulitis of large intestine without perforation or abscess without bleeding     Diverticulosis 09/25/2010; 11/02/2010; 11/08/2011; 7/29/2012    Duodenal disorder 08/25/2011    Duodenal erosion noted on EGD.    Duodenal ulcer, unspecified as acute or chronic, without hemorrhage, perforation, or obstruction 8/24/2011    E. coli sepsis 12/2010    Due to left ureteral stone with left nephrostomy tube - hospitalized in [place]    Esophageal dysmotility 01/24/2012    Noted on upper GI-barium swallow.    Facial weakness 1969    Left facial weakness s/p left mastoidectomy in ~ 1969.     Fatty liver 11/08/2011    Reported on CT-abdomen and in 06/2012 Gastro clinic visit note.    Gastric polyp 09/29/2010    GERD (gastroesophageal reflux disease) 7/29/2012    Hepatomegaly 11/08/2011    Reported on CT-abdomen    Herpes zoster with other nervous system complications(053.19) 2/28/2011    Hiatal hernia 06/26/2006; 09/29/2010; 08/25/2011    Noted on barium swallow 2006; noted on  EGD 2011.    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    Trouble in sleeping     Type II or unspecified type diabetes mellitus with neurological manifestations, not stated as uncontrolled(250.60)     Type II or unspecified type diabetes mellitus with peripheral circulatory disorders, not stated as uncontrolled(250.70)     Type II or unspecified type diabetes mellitus with renal manifestations, not stated as uncontrolled(250.40)      Past Surgical History:   Procedure Laterality Date    BELPHAROPTOSIS REPAIR      s/p LAMBERTO levator repair - Dr. Dejesus    CARDIAC CATHETERIZATION      CARPAL TUNNEL RELEASE  3/13/2012    CATARACT EXTRACTION W/  INTRAOCULAR LENS IMPLANT Right 10/31/2018        CATARACT EXTRACTION W/  INTRAOCULAR LENS IMPLANT Left 03/22/2018        CHOLECYSTECTOMY       COLONOSCOPY N/A 11/16/2016    Procedure: COLONOSCOPY;  Surgeon: Chris Storey MD;  Location: [facility] ENDO (4TH FLR);  Service: Endoscopy;  Laterality: N/A;    COLONOSCOPY N/A 6/14/2017    Procedure: COLONOSCOPY;  Surgeon: Chris Storey MD;  Location: [facility] ENDO (4TH FLR);  Service: Endoscopy;  Laterality: N/A;  colonoscopy in 3 months with better bowel prep. - Split PEG prep ordered    CYSTOSCOPY  4/19/2023    Procedure: CYSTOSCOPY;  Surgeon: Domo Bates MD;  [facility];  Service: Urology;;    CYSTOSCOPY N/A 5/12/2023    Procedure: CYSTOSCOPY;  Surgeon: Laz Rudolph Jr., MD;  [facility];  Service: Urology;  Laterality: N/A;    CYSTOSCOPY N/A 7/7/2023    Procedure: CYSTOSCOPY;  Surgeon: Laz Rudolph Jr., MD;  [facility];  Service: Urology;  Laterality: N/A;    ESOPHAGOGASTRODUODENOSCOPY N/A 11/10/2021    Procedure: EGD (ESOPHAGOGASTRODUODENOSCOPY);  Surgeon: Kuldeep Velázquez MD;  Location: [facility] (4TH FLR);  Service: Endoscopy;  Laterality: N/A;  11/4-eliquis hold ok see te -tb-fully vacc-inst mail and verbal-    EXTRACORPOREAL SHOCK WAVE LITHOTRIPSY      EXTRACTION - STONE Right 5/12/2023    Procedure: EXTRACTION - STONE;  Surgeon: Laz Rudolph Jr., MD;  [facility];  Service: Urology;  Laterality: Right;    INJECTION OF ANESTHETIC AGENT AROUND NERVE Bilateral 6/24/2020    Procedure: BLOCK, NERVE, C4-C5-C6 MEDIAL BRANCH ok per [place] to add on;  Surgeon: Oscar Ravi MD;  Location: [facility] PAIN MGT;  Service: Pain Management;  Laterality: Bilateral;    INTRAOCULAR PROSTHESES INSERTION Right 10/31/2018    Procedure: INSERTION-INTRAOCULAR LENS (IOL);  Surgeon: Keysha Valle MD;  Location: [facility] OR;  Service: Ophthalmology;  Laterality: Right;    JOINT REPLACEMENT      LASER LITHOTRIPSY Right 5/12/2023    Procedure: LITHOTRIPSY, USING LASER;  Surgeon: Laz Rudolph Jr., MD;  Location: [facility] OR [address] [place] [address];  Service: Urology;  Laterality:  Right;    LASER LITHOTRIPSY Right 7/7/2023    Procedure: LITHOTRIPSY, USING LASER;  Surgeon: Laz Rudolph Jr., MD;  [facility];  Service: Urology;  Laterality: Right;    mastoid tumor removal  1969    Left mastoidectomy with residual left facial weakness.    NEPHROSTOMY      OOPHORECTOMY      PHACOEMULSIFICATION OF CATARACT Right 10/31/2018    Procedure: PHACOEMULSIFICATION-ASPIRATION-CATARACT;  Surgeon: Keysha Valle MD;  Location: [facility] OR;  Service: Ophthalmology;  Laterality: Right;    PYELOSCOPY Right 5/12/2023    Procedure: PYELOSCOPY;  Surgeon: Laz Rudolph Jr., MD;  [facility];  Service: Urology;  Laterality: Right;    PYELOSCOPY Right 7/7/2023    Procedure: PYELOSCOPY;  Surgeon: Laz Rudolph Jr., MD;  [facility];  Service: Urology;  Laterality: Right;    REMOVAL, STENT, URETER Right 5/12/2023    Procedure: REMOVAL, STENT, URETER;  Surgeon: Laz Rudolph Jr., MD;  [facility];  Service: Urology;  Laterality: Right;    REMOVAL, STENT, URETER Right 7/7/2023    Procedure: REMOVAL, STENT, URETER;  Surgeon: Laz Rudolph Jr., MD;  [facility];  Service: Urology;  Laterality: Right;    RETROGRADE PYELOGRAPHY Right 4/19/2023    Procedure: PYELOGRAM, RETROGRADE;  Surgeon: Domo Bates MD;  [facility];  Service: Urology;  Laterality: Right;    TOTAL ABDOMINAL HYSTERECTOMY  1969    TAHBSO    TOTAL KNEE ARTHROPLASTY  9/13/2012    Left    TRANSFORAMINAL EPIDURAL INJECTION OF STEROID Bilateral 9/17/2019    Procedure: INJECTION, STEROID, EPIDURAL, TRANSFORAMINAL APPROACH;  Surgeon: Oscar Ravi MD;  Location: [facility] PAIN MGT;  Service: Pain Management;  Laterality: Bilateral;  B/L TFESI L4/L5    TRANSFORAMINAL EPIDURAL INJECTION OF STEROID Bilateral 10/2/2020    Procedure: INJECTION, STEROID, EPIDURAL, TRANSFORAMINAL APPROACH;  Surgeon: Oscar Ravi MD;  Location: [facility] PAIN MGT;  Service: Pain Management;   Laterality: Bilateral;  B/L TFESI L4/5    TRANSFORAMINAL EPIDURAL INJECTION OF STEROID Bilateral 5/7/2021    Procedure: INJECTION, STEROID, EPIDURAL, TRANSFORAMINAL APPROACH, L4-L5 clear to hold Eliquis 3 days;  Surgeon: Oscar Ravi MD;  Location: [facility] PAIN MGT;  Service: Pain Management;  Laterality: Bilateral;    URETERAL STENT PLACEMENT Right 4/19/2023    Procedure: INSERTION, STENT, URETER;  Surgeon: Domo Bates MD;  [facility];  Service: Urology;  Laterality: Right;    URETERAL STENT PLACEMENT N/A 5/12/2023    Procedure: INSERTION, STENT, URETER;  Surgeon: Laz Rudolph Jr., MD;  [facility];  Service: Urology;  Laterality: N/A;    URETERAL STENT PLACEMENT Right 7/7/2023    Procedure: INSERTION, STENT, URETER;  Surgeon: Laz Rudolph Jr., MD;  [facility];  Service: Urology;  Laterality: Right;    URETEROSCOPY Right 5/12/2023    Procedure: URETEROSCOPY;  Surgeon: Laz Rudolph Jr., MD;  [facility];  Service: Urology;  Laterality: Right;    URETEROSCOPY Right 7/7/2023    Procedure: URETEROSCOPY;  Surgeon: Laz Rudolph Jr., MD;  [facility];  Service: Urology;  Laterality: Right;     Allergies:     Review of patient's allergies indicates:   Allergen Reactions    Crestor [rosuvastatin]      Cramping in legs    Ezetimibe Diarrhea     Other reaction(s): abdominal pain, Diarrhea      Hydrocodone Itching    Lisinopril      Other reaction(s): cough      Sulfa (sulfonamide antibiotics) Itching and Rash           Sulfamethoxazole-trimethoprim     Valsartan Swelling     Medications:     Current Outpatient Medications on File Prior to Visit   Medication Sig Dispense Refill    ACCU-CHEK GUIDE TEST STRIPS Strp TEST BLOOD SUGAR THREE TIMES DAILY 300 strip 3    ACCU-CHEK SOFT DEV LANCETS Kit       albuterol (PROVENTIL/VENTOLIN HFA) 90 mcg/actuation inhaler Inhale 2 puffs by mouth into the lungs every 4 (four) hours as needed for  "Wheezing. Rescue 25.5 g 3    amLODIPine (NORVASC) 10 MG tablet Take 1 tablet (10 mg total) by mouth once daily. 90 tablet 0    apixaban (ELIQUIS) 5 mg Tab Take 1 tablet (5 mg total) by mouth 2 (two) times daily. 60 tablet 0    atorvastatin (LIPITOR) 40 MG tablet Take 1 tablet (40 mg total) by mouth once daily. 90 tablet 1    BD ULTRA-FINE OLU PEN NEEDLE 32 gauge x 5/32" Ndle Uses 4 times daily, on multiple daily insulin injections 130 each 12    benzonatate (TESSALON) 100 MG capsule Take 1 capsule (100 mg total) by mouth 3 (three) times daily as needed for Cough. 30 capsule 0    blood-glucose meter kit Use as instructed 1 each 0    carvediloL (COREG) 25 MG tablet Take 1 tablet (25 mg total) by mouth 2 (two) times daily. 180 tablet 1    cholecalciferol, vitamin D3, (VITAMIN D3) 50 mcg (2,000 unit) Cap capsule Take 2,000 Units by mouth once daily.      DROPLET PEN NEEDLE 29 gauge x 1/2" Ndle USE FOUR TIMES DAILY 400 each 3    DROPSAFE ALCOHOL PREP PADS PadM USE TOPICALLY TO TEST BLOOD SUGAR FOUR TIMES DAILY 400 each 3    dulaglutide (TRULICITY) 0.75 mg/0.5 mL pen injector Inject 0.75 mg into the skin every 7 days. 4 pen 2    DULoxetine (CYMBALTA) 60 MG capsule Take 1 capsule (60 mg total) by mouth 2 (two) times daily. Please hold until you follow up with your primary care provider 180 capsule 1    fluticasone propionate (FLONASE) 50 mcg/actuation nasal spray Instill 2 sprays in each nostril once daily. 16 g 1    furosemide (LASIX) 20 MG tablet Take 1 tablet (20 mg total) by mouth every morning. Take 1 tablet by mouth on Monday, Wednesday, and Friday 90 tablet 3    insulin (LANTUS SOLOSTAR U-100 INSULIN) glargine 100 units/mL SubQ pen Inject 24 Units into the skin once daily.      insulin aspart U-100 (NOVOLOG) 100 unit/mL (3 mL) InPn pen Inject 10 units before meals, hold if sugar is less than 100. Max daily 45 units. 45 mL 3    lancets (ACCU-CHEK SOFTCLIX LANCETS) Misc TEST BLOOD SUGAR FOUR TIMES DAILY 400 each 3    " loratadine (CLARITIN) 10 mg tablet Take 1 tablet (10 mg total) by mouth daily as needed for Allergies. 30 tablet 1    magnesium oxide (MAG-OX) 400 mg (241.3 mg magnesium) tablet Take 1 tablet (400 mg total) by mouth once daily. 100 tablet 4    olopatadine (PATADAY) 0.2 % Drop Place 1 drop into both eyes once daily. 2.5 mL 0    oxyCODONE-acetaminophen (PERCOCET)  mg per tablet Take 1 tablet by mouth 5 (five) times daily. 150 tablet 0    pantoprazole (PROTONIX) 40 MG tablet Take 1 tablet (40 mg total) by mouth once daily. 90 tablet 1    rOPINIRole (REQUIP) 0.5 MG tablet Take 1 tablet (0.5 mg total) by mouth every evening. 90 tablet 3     No current facility-administered medications on file prior to visit.     Social History:     Social History     Tobacco Use    Smoking status: Former     Current packs/day: 0.00     Average packs/day: 1 pack/day for 15.0 years (15.0 ttl pk-yrs)     Types: Cigarettes     Start date: 1985     Quit date: 2000     Years since quittin.8    Smokeless tobacco: Never   Substance Use Topics    Alcohol use: No     Family History:     Family History   Problem Relation Name Age of Onset    Sleep apnea Sister Coco     Diabetes Sister Coco     Cancer Mother          brain tumor    Hypertension Mother      Heart disease Father      Heart attack Father      Dementia Brother Abdulaziz     Lupus Brother Abdulaziz     Frontotemporal dementia Brother Abdulaziz     No Known Problems Daughter Corie     No Known Problems Son Calvin     Diabetes Sister Cristy     Lupus Sister Cirsty     Breast cancer Sister Rcisty     Diabetes Sister Geo Mahsa     Cancer Sister Kenzie         breast cancer    Heart attack Sister Sarah     Diabetes Sister Coco     Liver cancer Brother Wayne     Drug abuse Brother Wayne     Alcohol abuse Brother Wayne     Cirrhosis Brother Hamilton     Drug abuse Brother Hamilton     No Known Problems Daughter April     No Known Problems Son Akin     No Known Problems Son Raz      No Known Problems Maternal Grandmother      No Known Problems Maternal Grandfather      No Known Problems Paternal Grandmother      No Known Problems Paternal Grandfather      Diabetes Brother Al     Dementia Brother Al     Diabetes Other      Ovarian cancer Other          Niece     Breast cancer Maternal Aunt      No Known Problems Maternal Uncle      No Known Problems Paternal Aunt      No Known Problems Paternal Uncle      Glaucoma Neg Hx      Blindness Neg Hx      Celiac disease Neg Hx      Colon cancer Neg Hx      Colon polyps Neg Hx      Esophageal cancer Neg Hx      Inflammatory bowel disease Neg Hx      Liver disease Neg Hx      Rectal cancer Neg Hx      Stomach cancer Neg Hx      Ulcerative colitis Neg Hx      Melanoma Neg Hx      Multiple sclerosis Neg Hx      Psoriasis Neg Hx      Amblyopia Neg Hx      Cataracts Neg Hx      Macular degeneration Neg Hx      Retinal detachment Neg Hx      Strabismus Neg Hx      Stroke Neg Hx      Thyroid disease Neg Hx       Physical Exam:   LMP  (LMP Unknown)    Wt Readings from Last 4 Encounters:   05/10/24 95.8 kg (211 lb 3.2 oz)   05/09/24 95.3 kg (210 lb)   05/09/24 95.2 kg (209 lb 14.1 oz)   04/23/24 94.3 kg (208 lb)         Constitutional: No distress, obese, conversant  HEENT: Sclera anicteric, PERRLA, EOMI  Neck: No JVD, no masses, good movement  CV: RRR, S1 and S2 normal, no additional heart sounds or murmurs. Pulses 2+ and equal bilaterally in radial arteries, Steve's normal on right. Distal pulses are 2+ and equal in the femoral, DP and PT areas bilaterally  Pulm: Clear to auscultation bilaterally with symmetrical expansion. Chest wall palpated for reproduction of pain symptoms, and no pain was able to be produced on palpation or resistance exercises  GI: Abdomen soft, non-tender, good bowel sounds  Extremities: Both extremities intact and grossly normal, skin is warm, no edema noted  Skin: No ecchymosis, erythema, or ulcers  Psych: AOx3, appropriate  affect  Neuro: CNII-XII intact, no focal deficits      Labs:     Lab Results   Component Value Date     05/24/2024    K 4.1 05/24/2024     05/24/2024    CO2 25 05/24/2024    BUN 10 05/24/2024    CREATININE 1.2 05/24/2024    ANIONGAP 11 05/24/2024     Lab Results   Component Value Date    HGBA1C 7.5 (H) 05/09/2024     Lab Results   Component Value Date     (H) 05/24/2024     (H) 05/09/2024     (H) 04/23/2024    Lab Results   Component Value Date    WBC 8.27 05/09/2024    HGB 11.8 (L) 05/09/2024    HCT 39.5 05/09/2024    HCT 48 04/25/2023     (L) 05/09/2024    GRAN 5.0 05/09/2024    GRAN 60.6 05/09/2024     Lab Results   Component Value Date    CHOL 156 09/08/2023    HDL 40 09/08/2023    LDLCALC 98.6 09/08/2023    TRIG 87 09/08/2023          Imaging:       EF   Date Value Ref Range Status   01/25/2022 55 % Final     Nuc Stress EF   Date Value Ref Range Status   02/09/2022 56 % Final     Nuc Rest EF   Date Value Ref Range Status   02/09/2022 42  Final     Echocardiogram  TTE 9/16/2023    Left Ventricle: The left ventricle is normal in size. Normal wall thickness. There is concentric remodeling. Normal wall motion. There is normal systolic function with a visually estimated ejection fraction of 60 - 65%. There is normal diastolic function.    Right Ventricle: Normal right ventricular cavity size. Wall thickness is normal. Right ventricle wall motion  is normal. Systolic function is normal.    Left Atrium: Left atrium is mildly dilated.    Tricuspid Valve: There is mild regurgitation.    Pulmonary Artery: The estimated pulmonary artery systolic pressure is 35 mmHg.    IVC/SVC: Normal venous pressure at 3 mmHg.     Stress testing  PET Stress Test 2/9/2022    The relative PET images show no clinically significant regional resting or stress induced perfusion abnormalities.    The whole heart absolute myocardial perfusion values averaged 1.33 cc/min/g at rest, which is elevated; 1.79  cc/min/g at stress, which is mildly reduced; and CFR is 1.35 , which is moderately reduced in part due to elevated resting flow.    CT attenuation images demonstrate moderate diffuse coronary calcifications in the LAD and LCX territory.    The gated perfusion images showed an ejection fraction of 42% at rest and 56% during stress. A normal ejection fraction is greater than 53%.    The wall motion is normal at rest and during stress.    The LV cavity size is normal at rest and stress.    The EKG portion of this study is negative for ischemia.    During stress, occasional PVCs are noted.    The patient reported no chest pain during the stress test.     Cath Lab  None     Other  None     [id number]  Normal sinus rhythm   Left axis deviation   LVH with repolarization abnormality ( R in aVL , [place] product )   Abnormal ECG   When compared with ECG of 15-SEP-2023 15:05,   No significant change was found   Assessment:     Plan:     Chronic diastolic heart failure  She is euvolemic on exam today  Continue current medications  Start SGLT2i with labs in 1 week     Venous insufficiency of both lower extremities  Leg swelling  Continue lasix 20mg qD  Recommend limiting dietary sodium to < 2 g per day  Elevate legs when possible   Compression stockings during the day as tolerable, especially when traveling      Essential hypertension  BP exceptionally elevated in clinic visit today and at all other clinic visits.  Patient states that she has not taken medications today.  Encouraged medication compliance.  Patient states that she has a blood pressure cuff at home.  I have requested that she bring that to her follow-up appointment so that we can ensure it is working properly.  Emphasized the importance of low-sodium diet     Coronary artery disease involving native coronary artery of native heart with angina pectoris  Aortic atherosclerosis  Continue Eliquis and statin     Mixed hyperlipidemia  Continue atorvastatin 40 mg  q.d.  Last fasting lipid panel drawn September 8th 2023, LDL 98.6  Consider increase of atorvastatin at follow-up    Signed:  Milad Zapata MD  Cardiology Fellow  Pager - [phone]  [facility]  5/29/2024 10:28 AM

## 2024-06-03 RX ORDER — OXYCODONE AND ACETAMINOPHEN 10; 325 MG/1; MG/1
1 TABLET ORAL
Qty: 150 TABLET | Refills: 0 | Status: SHIPPED | OUTPATIENT
Start: 2024-06-03 | End: 2024-06-14 | Stop reason: SDUPTHER

## 2024-06-03 NOTE — TELEPHONE ENCOUNTER
No care due was identified.  [facility] Embedded Care Due Messages. Reference number: [id number].   6/03/2024 2:59:51 PM CDT

## 2024-06-05 ENCOUNTER — LAB VISIT (OUTPATIENT)
Dept: LAB | Facility: HOSPITAL | Age: 84
End: 2024-06-05
Payer: MEDICARE

## 2024-06-05 DIAGNOSIS — I50.32 CHRONIC DIASTOLIC HEART FAILURE: ICD-10-CM

## 2024-06-05 LAB
ANION GAP SERPL CALC-SCNC: 8 MMOL/L (ref 8–16)
BUN SERPL-MCNC: 12 MG/DL (ref 8–23)
CALCIUM SERPL-MCNC: 10.2 MG/DL (ref 8.7–10.5)
CHLORIDE SERPL-SCNC: 105 MMOL/L (ref 95–110)
CO2 SERPL-SCNC: 29 MMOL/L (ref 23–29)
CREAT SERPL-MCNC: 1.5 MG/DL (ref 0.5–1.4)
EST. GFR  (NO RACE VARIABLE): 34.2 ML/MIN/1.73 M^2
GLUCOSE SERPL-MCNC: 151 MG/DL (ref 70–110)
POTASSIUM SERPL-SCNC: 3.9 MMOL/L (ref 3.5–5.1)
SODIUM SERPL-SCNC: 142 MMOL/L (ref 136–145)

## 2024-06-05 PROCEDURE — 36415 COLL VENOUS BLD VENIPUNCTURE: CPT | Mod: HCNC | Performed by: STUDENT IN AN ORGANIZED HEALTH CARE EDUCATION/TRAINING PROGRAM

## 2024-06-05 PROCEDURE — 80048 BASIC METABOLIC PNL TOTAL CA: CPT | Mod: HCNC | Performed by: STUDENT IN AN ORGANIZED HEALTH CARE EDUCATION/TRAINING PROGRAM

## 2024-06-12 ENCOUNTER — TELEPHONE (OUTPATIENT)
Dept: CARDIOLOGY | Facility: CLINIC | Age: 84
End: 2024-06-12
Payer: MEDICARE

## 2024-06-12 DIAGNOSIS — I50.32 CHRONIC DIASTOLIC HEART FAILURE: Primary | ICD-10-CM

## 2024-06-12 NOTE — TELEPHONE ENCOUNTER
Have tried calling pt numerous times regarding her BMP.  Her Cr is elevated compared to prior.  Likely she is dry with SGLT2i and lasix.  Would like to hold lasix for 1 week and repeat labs.  Left VM. . Labs ordered.  
Female

## 2024-06-14 ENCOUNTER — LAB VISIT (OUTPATIENT)
Dept: LAB | Facility: HOSPITAL | Age: 84
End: 2024-06-14
Attending: INTERNAL MEDICINE
Payer: MEDICARE

## 2024-06-14 ENCOUNTER — OFFICE VISIT (OUTPATIENT)
Dept: INTERNAL MEDICINE | Facility: CLINIC | Age: 84
End: 2024-06-14
Payer: MEDICARE

## 2024-06-14 VITALS
HEART RATE: 73 BPM | WEIGHT: 207.81 LBS | OXYGEN SATURATION: 99 % | BODY MASS INDEX: 31.49 KG/M2 | DIASTOLIC BLOOD PRESSURE: 64 MMHG | HEIGHT: 68 IN | SYSTOLIC BLOOD PRESSURE: 126 MMHG

## 2024-06-14 DIAGNOSIS — I10 ESSENTIAL HYPERTENSION: Chronic | ICD-10-CM

## 2024-06-14 DIAGNOSIS — K21.9 GASTROESOPHAGEAL REFLUX DISEASE, UNSPECIFIED WHETHER ESOPHAGITIS PRESENT: Primary | ICD-10-CM

## 2024-06-14 DIAGNOSIS — I50.32 CHRONIC DIASTOLIC HEART FAILURE: ICD-10-CM

## 2024-06-14 DIAGNOSIS — N18.32 STAGE 3B CHRONIC KIDNEY DISEASE: ICD-10-CM

## 2024-06-14 DIAGNOSIS — E11.9 TYPE 2 DIABETES MELLITUS WITHOUT COMPLICATION, WITH LONG-TERM CURRENT USE OF INSULIN: ICD-10-CM

## 2024-06-14 DIAGNOSIS — Z79.4 TYPE 2 DIABETES MELLITUS WITHOUT COMPLICATION, WITH LONG-TERM CURRENT USE OF INSULIN: ICD-10-CM

## 2024-06-14 LAB
ANION GAP SERPL CALC-SCNC: 10 MMOL/L (ref 8–16)
BUN SERPL-MCNC: 14 MG/DL (ref 8–23)
CALCIUM SERPL-MCNC: 9.9 MG/DL (ref 8.7–10.5)
CHLORIDE SERPL-SCNC: 103 MMOL/L (ref 95–110)
CO2 SERPL-SCNC: 26 MMOL/L (ref 23–29)
CREAT SERPL-MCNC: 1.5 MG/DL (ref 0.5–1.4)
EST. GFR  (NO RACE VARIABLE): 34.2 ML/MIN/1.73 M^2
GLUCOSE SERPL-MCNC: 165 MG/DL (ref 70–110)
POTASSIUM SERPL-SCNC: 3.8 MMOL/L (ref 3.5–5.1)
SODIUM SERPL-SCNC: 139 MMOL/L (ref 136–145)

## 2024-06-14 PROCEDURE — 80048 BASIC METABOLIC PNL TOTAL CA: CPT | Mod: HCNC | Performed by: INTERNAL MEDICINE

## 2024-06-14 PROCEDURE — 99999 PR PBB SHADOW E&M-EST. PATIENT-LVL IV: CPT | Mod: PBBFAC,HCNC,, | Performed by: INTERNAL MEDICINE

## 2024-06-14 PROCEDURE — 36415 COLL VENOUS BLD VENIPUNCTURE: CPT | Mod: HCNC | Performed by: INTERNAL MEDICINE

## 2024-06-14 RX ORDER — AMLODIPINE BESYLATE 10 MG/1
10 TABLET ORAL DAILY
Qty: 90 TABLET | Refills: 1 | Status: SHIPPED | OUTPATIENT
Start: 2024-06-14 | End: 2025-06-14

## 2024-06-14 RX ORDER — OXYCODONE AND ACETAMINOPHEN 10; 325 MG/1; MG/1
1 TABLET ORAL
Qty: 150 TABLET | Refills: 0 | Status: SHIPPED | OUTPATIENT
Start: 2024-07-02

## 2024-06-14 RX ORDER — OXYCODONE AND ACETAMINOPHEN 10; 325 MG/1; MG/1
TABLET ORAL
Qty: 150 TABLET | Refills: 0 | Status: SHIPPED | OUTPATIENT
Start: 2024-08-01

## 2024-06-14 RX ORDER — DULAGLUTIDE 0.75 MG/.5ML
0.75 INJECTION, SOLUTION SUBCUTANEOUS
Qty: 4 PEN | Refills: 2 | Status: SHIPPED | OUTPATIENT
Start: 2024-06-14

## 2024-06-14 RX ORDER — ATORVASTATIN CALCIUM 40 MG/1
40 TABLET, FILM COATED ORAL DAILY
Qty: 90 TABLET | Refills: 1 | Status: SHIPPED | OUTPATIENT
Start: 2024-06-14

## 2024-06-14 NOTE — PROGRESS NOTES
CHIEF COMPLAINT:   Follow up of multple problems.      HISTORY OF PRESENT ILLNESS: This is a 83-year-old woman who presents for follow up of above    She brings her medication bottles with her today and has the correct medications. She does not have her insulins and trulicity with her.      She is taking Lantus 24 units once in the evening.  She takes novolog 12-14 units with meals.  Sugars are still 100-130. She is taking Trulicity 0.75 mg once a week on a Sunday.  Cardiology started her on Farxiga 10 mg once daily 5/29/24.  She does not feel weak.      She is taking magnesium oxide 400 mg daily     She continues to have itching on her arms and neck. Comes and goes.       She continues to have neck and shoulder pain, back pain. She complains of back pain and body pain  which has been severe. She is taking oxycodone apap 10/325 up to 5 times daily.   She continues to take duloxetine 60 mg twice daily.  Her mood is doing ok.        She saw Dr Reilly on 1/18/22 - she continues to take Eliquis 5 mg twice daily.  She had cardiac PET stress 2/9/2022 which was negative for ischemia          No fever or chills. She has felt cold.  No nausea, vomiting, constipation, dysuria, hematuira.  She denies swallowing problems today.        She is living alone. She does not see her grandson very much. She has a niece, Kacie Mast who lives locally.      She is taking  coreg 25 mg twice daily  and furosemide 20 mg once daily and Farxiga 10 mg daily for her hypertension and diastolic dysfunction.     She is taking requipo 0.5  mg at bedtime - for helping the restless leg     She had a right ureteroscopy with laser lithotripsy and stone basketing on 5/12/23.  Her pain from the stone has resolved.  She still has a urinary stent.         PAST MEDICAL HISTORY:   1. Diabetes mellitus   2. Hypertension   3. Hyperlipidemia   4. Left heart catheterization January 2007 which revealed luminal irregularities in the LAD, left circumflex and right  "coronary artery. She had diastolic dysfunction and patent renal arteries.   5. Sleep apnea   6. Obesity.   7. Nephrolithiasis status post lithotripsy.   8. Reflux - had nonerosive gastropathy on EGD September 2007. Gastritis on EGD 9/2010  9. Hidradenitis suppurativa.   10. History of diverticulitis with a hospitalized October 2007.   11. Migraines   12. Obesity.   13. 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    14. Total hysterectomy in 1969.   15. Cholecystectomy was done at that time as well.   16. 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   17. Colon polyps on colonscopy 11/2010 - due 2013   18. Hospitalization 12/10 for e coli sepsis due to left ureteral stone with left nephrostomy tube in [place], [place]   19. Left knee replacement 9/2012      MEDICATIONS and ALLERGIES: Updated on EPIC.     PHYSICAL EXAMINATION:          /64 (BP Location: Right arm, Patient Position: Sitting, BP Method: Large (Manual))   Pulse 73   Ht 5' 8" (1.727 m)   Wt 94.3 kg (207 lb 12.5 oz)   LMP  (LMP Unknown)   SpO2 99%   BMI 31.59 kg/m²       GENERAL: She is alert, oriented, no apparent distress. Affect within   normal limits.  Conjunctiva anicteric.  OP - possible thrush on tongue  NECK: Supple.   Respiratory: Effort normal. Lungs clear  HEART: Regular rate and rhythm without murmurs, gallops or rubs.   No lower extremity edema.  .      ASSESSMENT AND PLAN:       1. Diabetes -stable.   2.  Kidney stones - asx  3. Hypertension with diastolic heart failure- stable. Negative PET stress.  stable on carvedilol 25 mg one tablet twice daily and amlodipine 10 mg once daily. Now on Farxiga 10  mg daily and furosemide 20 gm daily- BMP today to make sure not dehydrated  4.  OA neck and lumbar spine - on oxycodone. stable  5.  Gout - asx off allopurinol  6.  Mood disorder -  on duloxetine 60 mg twice daily  7.  Swallowing disorder - s/p EGD with dilation on " 11/11/21.   8. Asthma - stable   9. Gerd -get on pantoprazole 40 mg  daily   10. Hyperlipidemia - off  lipitor  11. Allergic rhinitis - stable  12. CRI -stable  13. Obesity - discussed diet, exercise and weight loss  14. CAD -risk factor modification  15. Aortic atherosclerosis and possible mesenteric artery stenosis- risk factor modification  16. Tortuous aorta - HTN controlled  17. Colon polyps - Colonoscopy in 8/2013 - 2 polyps. Flex sig 11/2013 - mild granular mucosa. Colonoscopy 6/2017 diverticulosis - no polyps due 2023. Defer due to age.   18. hyperparathryodisism -saw nephrology   19. .Restless leg syndrome - on roprinole    20. Stage 3b kidney disease - labs today     .  MMG 5/9/24        I will see her back in 2 months, Will see Margaret CHAVEZ in one month,  sooner if problems arise

## 2024-06-16 ENCOUNTER — TELEPHONE (OUTPATIENT)
Dept: INTERNAL MEDICINE | Facility: CLINIC | Age: 84
End: 2024-06-16
Payer: MEDICARE

## 2024-06-17 NOTE — TELEPHONE ENCOUNTER
Spoke to pt 's niece and she verbalized understanding   She will make sure to decrease it to one on MON Wed and Fri

## 2024-06-17 NOTE — TELEPHONE ENCOUNTER
Please notify pt  Blood work from Friday 6/14/24 reveals that she is slightly dehydrated-   Decrease furosemide to one tablet on MOnday, Wednesday and Friday

## 2024-07-01 NOTE — TELEPHONE ENCOUNTER
Refill Routing Note   Medication(s) are not appropriate for processing by [facility] for the following reason(s):        Outside of protocol    [facility] action(s):  Route             Appointments  past 12m or future 3m with PCP    Date Provider   Last Visit   6/14/2024 Jeannine Arriaga MD   Next Visit   8/16/2024 Jeannine Arriaga MD   ED visits in past 90 days: 1        Note composed:10:26 AM 07/01/2024

## 2024-07-18 ENCOUNTER — OFFICE VISIT (OUTPATIENT)
Dept: INTERNAL MEDICINE | Facility: CLINIC | Age: 84
End: 2024-07-18
Payer: MEDICARE

## 2024-07-18 ENCOUNTER — LAB VISIT (OUTPATIENT)
Dept: LAB | Facility: HOSPITAL | Age: 84
End: 2024-07-18
Payer: MEDICARE

## 2024-07-18 VITALS
HEIGHT: 68 IN | SYSTOLIC BLOOD PRESSURE: 130 MMHG | DIASTOLIC BLOOD PRESSURE: 76 MMHG | OXYGEN SATURATION: 99 % | BODY MASS INDEX: 31.63 KG/M2 | HEART RATE: 75 BPM | WEIGHT: 208.69 LBS

## 2024-07-18 DIAGNOSIS — E11.9 TYPE 2 DIABETES MELLITUS WITHOUT COMPLICATION, WITH LONG-TERM CURRENT USE OF INSULIN: ICD-10-CM

## 2024-07-18 DIAGNOSIS — Z86.718 HISTORY OF DVT (DEEP VEIN THROMBOSIS): ICD-10-CM

## 2024-07-18 DIAGNOSIS — Z79.4 TYPE 2 DIABETES MELLITUS WITHOUT COMPLICATION, WITH LONG-TERM CURRENT USE OF INSULIN: ICD-10-CM

## 2024-07-18 DIAGNOSIS — I10 ESSENTIAL HYPERTENSION: Chronic | ICD-10-CM

## 2024-07-18 DIAGNOSIS — I25.119 CORONARY ARTERY DISEASE INVOLVING NATIVE CORONARY ARTERY OF NATIVE HEART WITH ANGINA PECTORIS: ICD-10-CM

## 2024-07-18 DIAGNOSIS — I10 HYPERTENSION, ESSENTIAL: ICD-10-CM

## 2024-07-18 DIAGNOSIS — F39 MOOD DISORDER: ICD-10-CM

## 2024-07-18 DIAGNOSIS — I50.32 CHRONIC DIASTOLIC HEART FAILURE: ICD-10-CM

## 2024-07-18 DIAGNOSIS — N18.31 STAGE 3A CHRONIC KIDNEY DISEASE: Primary | ICD-10-CM

## 2024-07-18 DIAGNOSIS — N18.31 TYPE 2 DIABETES MELLITUS WITH STAGE 3A CHRONIC KIDNEY DISEASE, WITH LONG-TERM CURRENT USE OF INSULIN: ICD-10-CM

## 2024-07-18 DIAGNOSIS — E11.22 TYPE 2 DIABETES MELLITUS WITH STAGE 3A CHRONIC KIDNEY DISEASE, WITH LONG-TERM CURRENT USE OF INSULIN: ICD-10-CM

## 2024-07-18 DIAGNOSIS — N18.31 STAGE 3A CHRONIC KIDNEY DISEASE: ICD-10-CM

## 2024-07-18 DIAGNOSIS — G89.4 CHRONIC PAIN SYNDROME: ICD-10-CM

## 2024-07-18 DIAGNOSIS — I70.0 AORTIC ATHEROSCLEROSIS: ICD-10-CM

## 2024-07-18 DIAGNOSIS — E66.9 OBESITY (BMI 30.0-34.9): ICD-10-CM

## 2024-07-18 DIAGNOSIS — Z79.4 TYPE 2 DIABETES MELLITUS WITH STAGE 3A CHRONIC KIDNEY DISEASE, WITH LONG-TERM CURRENT USE OF INSULIN: ICD-10-CM

## 2024-07-18 DIAGNOSIS — E78.2 MIXED HYPERLIPIDEMIA: ICD-10-CM

## 2024-07-18 LAB
ANION GAP SERPL CALC-SCNC: 8 MMOL/L (ref 8–16)
BUN SERPL-MCNC: 14 MG/DL (ref 8–23)
CALCIUM SERPL-MCNC: 9.7 MG/DL (ref 8.7–10.5)
CHLORIDE SERPL-SCNC: 105 MMOL/L (ref 95–110)
CO2 SERPL-SCNC: 28 MMOL/L (ref 23–29)
CREAT SERPL-MCNC: 1.3 MG/DL (ref 0.5–1.4)
EST. GFR  (NO RACE VARIABLE): 40.5 ML/MIN/1.73 M^2
GLUCOSE SERPL-MCNC: 139 MG/DL (ref 70–110)
POTASSIUM SERPL-SCNC: 4.3 MMOL/L (ref 3.5–5.1)
SODIUM SERPL-SCNC: 141 MMOL/L (ref 136–145)

## 2024-07-18 PROCEDURE — 80048 BASIC METABOLIC PNL TOTAL CA: CPT | Mod: HCNC | Performed by: PHYSICIAN ASSISTANT

## 2024-07-18 PROCEDURE — 36415 COLL VENOUS BLD VENIPUNCTURE: CPT | Mod: HCNC | Performed by: PHYSICIAN ASSISTANT

## 2024-07-18 PROCEDURE — 99999 PR PBB SHADOW E&M-EST. PATIENT-LVL III: CPT | Mod: PBBFAC,HCNC,, | Performed by: PHYSICIAN ASSISTANT

## 2024-07-18 NOTE — PROGRESS NOTES
Subjective:       Patient ID: Henrietta Villasenor is a 84 y.o. female.        Chief Complaint: Follow-up    Henrietta Villasenor is an established patient of Jeannine Arriaga MD here today for follow up visit.    Feeling well today.  No longer feeling short of breath.  She did not bring her medications today.  She had some diarrhea yesterday but resolved today.  She confirms she reduced lasix back to M/W/F.    DM -    Trulicity 0.75 mg/wee  Lantus 24 U daily  Novolog 10 U with meals, hold for blood sugar <100  Blood sugar 130-140 at home     Lab Results       Component                Value               Date                       HGBA1C                   7.5 (H)             05/09/2024                 HGBA1C                   7.2 (H)             01/31/2024                 HGBA1C                   6.8 (H)             12/06/2023                      HTN -   Carvedilol 25 mg BID  Amlodipine 10 mg daily     Heart failure, CAD -   Lasix 20 mg M/W/F  Farxiga 10 mg added 5/2024 by cardiology     H/o intermittent cough, not currently, h/o thrush tx with diflucan     Chronic pain due to OA of neck and lumbar spine tx with oxycodone, also on cymbalta 60 mg BID     RLS tx with requip     She had a right ureteroscopy with laser lithotripsy and stone basketing on 5/12/23     GERD tx with protonix 40 mg     Lipid tx with lipitor 40 mg     H/o DVT tx with eliquis         Review of Systems   Constitutional:  Negative for chills, diaphoresis, fatigue and fever.   HENT:  Negative for congestion and sore throat.    Eyes:  Negative for visual disturbance.   Respiratory:  Negative for cough, chest tightness and shortness of breath.    Cardiovascular:  Negative for chest pain, palpitations and leg swelling.   Gastrointestinal:  Negative for abdominal pain, blood in stool, constipation, diarrhea, nausea and vomiting.   Genitourinary:  Negative for dysuria, frequency, hematuria and urgency.   Musculoskeletal:  Negative for arthralgias and back  pain.   Skin:  Negative for rash.   Neurological:  Negative for dizziness, syncope, weakness and headaches.   Psychiatric/Behavioral:  Negative for dysphoric mood and sleep disturbance. The patient is not nervous/anxious.        Objective:      Physical Exam  Vitals and nursing note reviewed.   Constitutional:       Appearance: Normal appearance. She is well-developed.   HENT:      Head: Normocephalic.      Right Ear: External ear normal.      Left Ear: External ear normal.   Eyes:      Pupils: Pupils are equal, round, and reactive to light.   Cardiovascular:      Rate and Rhythm: Normal rate and regular rhythm.      Heart sounds: Normal heart sounds. No murmur heard.     No friction rub. No gallop.   Pulmonary:      Effort: Pulmonary effort is normal. No respiratory distress.      Breath sounds: Normal breath sounds.   Abdominal:      Palpations: Abdomen is soft.      Tenderness: There is no abdominal tenderness.   Skin:     General: Skin is warm and dry.   Neurological:      Mental Status: She is alert.         Assessment:       1. Stage 3a chronic kidney disease    2. Hypertension, essential    3. Type 2 diabetes mellitus with stage 3a chronic kidney disease, with long-term current use of insulin    4. Chronic pain syndrome    5. Mood disorder    6. Essential hypertension    7. Chronic diastolic heart failure    8. Coronary artery disease involving native coronary artery of native heart with angina pectoris    9. Mixed hyperlipidemia    10. Aortic atherosclerosis    11. History of DVT (deep vein thrombosis)    12. Type 2 diabetes mellitus without complication, with long-term current use of insulin    13. Obesity (BMI 30.0-34.9)        Plan:       Henrietta was seen today for follow-up.    Diagnoses and all orders for this visit:    Stage 3a chronic kidney disease  -     Basic Metabolic Panel; Future    Hypertension, essential - BP actually looks great today  BP Readings from Last 5 Encounters:   07/18/24 130/76  "  06/14/24 126/64   05/29/24 (!) 141/90   05/10/24 (!) 212/100   05/09/24 (!) 146/76      Type 2 diabetes mellitus with stage 3a chronic kidney disease, with long-term current use of insulin  -     Ambulatory referral/consult to Diabetes Education; Future    Chronic pain syndrome - stable and controlled, continue current regimen    Mood disorder - stable and controlled, continue current regimen, mood is very good today    Chronic diastolic heart failure - doing well with addition of farxiga, lasix reduced to M/W/F    Coronary artery disease involving native coronary artery of native heart with angina pectoris - no chest pain or shortness of breath    Mixed hyperlipidemia - stable and controlled, continue current regimen  Lab Results   Component Value Date    CHOL 156 09/08/2023    TRIG 87 09/08/2023    HDL 40 09/08/2023    LDLCALC 98.6 09/08/2023     Aortic atherosclerosis - stable and controlled, continue current regimen    History of DVT (deep vein thrombosis)    Type 2 diabetes mellitus without complication, with long-term current use of insulin        -     Ambulatory referral/consult to Diabetes Education; Future    Obesity (BMI 30.0-34.9) - work on weight loss, exercise, healthy diet    F/u next month as scheduled with Dr. Arriaga     Pt has been given instructions populated from patient instructions database and has verbalized understanding of the after visit summary and information contained wherein.    Follow up with a primary care provider. May go to ER for acute shortness of breath, lightheadedness, fever, or any other emergent complaints or changes in condition.    "This note will be shared with the patient"    Future Appointments   Date Time Provider Department Center   8/16/2024  1:00 PM Jeannine Arriaga MD [facility] [place]   9/24/2024  1:00 PM Margaret Cabrera PA[facility]wickC [facility] [place]   11/5/2024  1:30 PM Jeannine Arriaga MD [facility] [place]                 "

## 2024-07-24 NOTE — ASSESSMENT & PLAN NOTE
-s/p right ureteral stent placement for 8mm UVJ stone and right hydroureteronephrosis, as well as HANNAH  -Cr improving, now 1.7 from 2.1  -Will arrange outpatient follow up for ureteroscopy  -Rest of care per primary  -Urology will sign off at this time   English

## 2024-08-16 ENCOUNTER — LAB VISIT (OUTPATIENT)
Dept: LAB | Facility: HOSPITAL | Age: 84
End: 2024-08-16
Attending: INTERNAL MEDICINE
Payer: MEDICARE

## 2024-08-16 ENCOUNTER — OFFICE VISIT (OUTPATIENT)
Dept: INTERNAL MEDICINE | Facility: CLINIC | Age: 84
End: 2024-08-16
Payer: MEDICARE

## 2024-08-16 VITALS
SYSTOLIC BLOOD PRESSURE: 120 MMHG | DIASTOLIC BLOOD PRESSURE: 74 MMHG | BODY MASS INDEX: 31.13 KG/M2 | OXYGEN SATURATION: 98 % | HEART RATE: 73 BPM | HEIGHT: 68 IN | WEIGHT: 205.38 LBS

## 2024-08-16 DIAGNOSIS — E11.42 TYPE 2 DIABETES MELLITUS WITH DIABETIC POLYNEUROPATHY: Chronic | ICD-10-CM

## 2024-08-16 DIAGNOSIS — R19.7 DIARRHEA, UNSPECIFIED TYPE: ICD-10-CM

## 2024-08-16 DIAGNOSIS — I50.32 CHRONIC DIASTOLIC HEART FAILURE: Primary | ICD-10-CM

## 2024-08-16 DIAGNOSIS — M10.9 GOUT, ARTHRITIS: ICD-10-CM

## 2024-08-16 DIAGNOSIS — K21.9 GASTROESOPHAGEAL REFLUX DISEASE WITHOUT ESOPHAGITIS: ICD-10-CM

## 2024-08-16 DIAGNOSIS — I10 ESSENTIAL HYPERTENSION: Chronic | ICD-10-CM

## 2024-08-16 LAB
ALBUMIN SERPL BCP-MCNC: 3.4 G/DL (ref 3.5–5.2)
ALP SERPL-CCNC: 101 U/L (ref 55–135)
ALT SERPL W/O P-5'-P-CCNC: 12 U/L (ref 10–44)
ANION GAP SERPL CALC-SCNC: 6 MMOL/L (ref 8–16)
AST SERPL-CCNC: 19 U/L (ref 10–40)
BASOPHILS # BLD AUTO: 0.06 K/UL (ref 0–0.2)
BASOPHILS NFR BLD: 0.6 % (ref 0–1.9)
BILIRUB SERPL-MCNC: 0.7 MG/DL (ref 0.1–1)
BUN SERPL-MCNC: 10 MG/DL (ref 8–23)
CALCIUM SERPL-MCNC: 10.1 MG/DL (ref 8.7–10.5)
CHLORIDE SERPL-SCNC: 105 MMOL/L (ref 95–110)
CO2 SERPL-SCNC: 29 MMOL/L (ref 23–29)
CREAT SERPL-MCNC: 1.1 MG/DL (ref 0.5–1.4)
DIFFERENTIAL METHOD BLD: ABNORMAL
EOSINOPHIL # BLD AUTO: 0.1 K/UL (ref 0–0.5)
EOSINOPHIL NFR BLD: 1.3 % (ref 0–8)
ERYTHROCYTE [DISTWIDTH] IN BLOOD BY AUTOMATED COUNT: 14.5 % (ref 11.5–14.5)
EST. GFR  (NO RACE VARIABLE): 49.5 ML/MIN/1.73 M^2
ESTIMATED AVG GLUCOSE: 154 MG/DL (ref 68–131)
GLUCOSE SERPL-MCNC: 79 MG/DL (ref 70–110)
HBA1C MFR BLD: 7 % (ref 4–5.6)
HCT VFR BLD AUTO: 40.5 % (ref 37–48.5)
HGB BLD-MCNC: 11.7 G/DL (ref 12–16)
IMM GRANULOCYTES # BLD AUTO: 0.05 K/UL (ref 0–0.04)
IMM GRANULOCYTES NFR BLD AUTO: 0.5 % (ref 0–0.5)
LYMPHOCYTES # BLD AUTO: 2.2 K/UL (ref 1–4.8)
LYMPHOCYTES NFR BLD: 20.1 % (ref 18–48)
MCH RBC QN AUTO: 26.2 PG (ref 27–31)
MCHC RBC AUTO-ENTMCNC: 28.9 G/DL (ref 32–36)
MCV RBC AUTO: 91 FL (ref 82–98)
MONOCYTES # BLD AUTO: 1 K/UL (ref 0.3–1)
MONOCYTES NFR BLD: 9.2 % (ref 4–15)
NEUTROPHILS # BLD AUTO: 7.4 K/UL (ref 1.8–7.7)
NEUTROPHILS NFR BLD: 68.3 % (ref 38–73)
NRBC BLD-RTO: 0 /100 WBC
PLATELET # BLD AUTO: 199 K/UL (ref 150–450)
PMV BLD AUTO: 10.1 FL (ref 9.2–12.9)
POTASSIUM SERPL-SCNC: 4.2 MMOL/L (ref 3.5–5.1)
PROT SERPL-MCNC: 7.5 G/DL (ref 6–8.4)
RBC # BLD AUTO: 4.47 M/UL (ref 4–5.4)
SODIUM SERPL-SCNC: 140 MMOL/L (ref 136–145)
TSH SERPL DL<=0.005 MIU/L-ACNC: 1.08 UIU/ML (ref 0.4–4)
WBC # BLD AUTO: 10.87 K/UL (ref 3.9–12.7)

## 2024-08-16 PROCEDURE — 84443 ASSAY THYROID STIM HORMONE: CPT | Mod: HCNC | Performed by: INTERNAL MEDICINE

## 2024-08-16 PROCEDURE — 85025 COMPLETE CBC W/AUTO DIFF WBC: CPT | Mod: HCNC | Performed by: INTERNAL MEDICINE

## 2024-08-16 PROCEDURE — 80053 COMPREHEN METABOLIC PANEL: CPT | Mod: HCNC | Performed by: INTERNAL MEDICINE

## 2024-08-16 PROCEDURE — 83036 HEMOGLOBIN GLYCOSYLATED A1C: CPT | Mod: HCNC | Performed by: INTERNAL MEDICINE

## 2024-08-16 PROCEDURE — 36415 COLL VENOUS BLD VENIPUNCTURE: CPT | Mod: HCNC | Performed by: INTERNAL MEDICINE

## 2024-08-16 PROCEDURE — 99999 PR PBB SHADOW E&M-EST. PATIENT-LVL IV: CPT | Mod: PBBFAC,HCNC,, | Performed by: INTERNAL MEDICINE

## 2024-08-16 RX ORDER — OXYCODONE AND ACETAMINOPHEN 10; 325 MG/1; MG/1
TABLET ORAL
Qty: 150 TABLET | Refills: 0 | Status: SHIPPED | OUTPATIENT
Start: 2024-08-30

## 2024-08-16 RX ORDER — VANCOMYCIN HYDROCHLORIDE 125 MG/1
125 CAPSULE ORAL 4 TIMES DAILY
Qty: 40 CAPSULE | Refills: 0 | Status: SHIPPED | OUTPATIENT
Start: 2024-08-16 | End: 2024-08-26

## 2024-08-16 RX ORDER — ACETAMINOPHEN 500 MG
5000 TABLET ORAL DAILY
COMMUNITY

## 2024-08-16 RX ORDER — PEN NEEDLE, DIABETIC 30 GX3/16"
NEEDLE, DISPOSABLE MISCELLANEOUS
Qty: 130 EACH | Refills: 12 | Status: SHIPPED | OUTPATIENT
Start: 2024-08-16

## 2024-08-16 RX ORDER — CARVEDILOL 25 MG/1
25 TABLET ORAL 2 TIMES DAILY
Qty: 180 TABLET | Refills: 1 | Status: SHIPPED | OUTPATIENT
Start: 2024-08-16

## 2024-08-16 RX ORDER — IOHEXOL 350 MG/ML
INJECTION, SOLUTION INTRAVENOUS
COMMUNITY
Start: 2024-04-23 | End: 2024-08-16

## 2024-08-16 RX ORDER — AMLODIPINE BESYLATE 5 MG/1
5 TABLET ORAL DAILY
Qty: 90 TABLET | Refills: 1 | Status: SHIPPED | OUTPATIENT
Start: 2024-08-16 | End: 2025-08-16

## 2024-08-16 RX ORDER — OXYCODONE AND ACETAMINOPHEN 10; 325 MG/1; MG/1
1 TABLET ORAL
Qty: 150 TABLET | Refills: 0 | Status: SHIPPED | OUTPATIENT
Start: 2024-09-27

## 2024-08-16 NOTE — PROGRESS NOTES
CHIEF COMPLAINT:   Follow up of multple problems.      HISTORY OF PRESENT ILLNESS: This is a 84-year-old woman who presents for follow up of above     She brings her medication bottles with her today and has the correct medications. She does not have her insulins and trulicity with her.  SHe does not have a bottle of carvedilol with her.     SHe reports that she has a BM after each meal. Black stool and watery.  Going the last month.      She is taking Lantus 24 units once in the evening.  She takes novolog 12-14 units with meals. She is taking Trulicity 0.75 mg once a week on a Sunday.  Cardiology started her on Farxiga 10 mg once daily 5/29/24.  She does not feel weak. Blood sugar today 103.      She is taking magnesium oxide 400 mg daily     She continues to have itching on her arms and neck. Comes and goes.       She continues to have neck and shoulder pain, back pain. She complains of back pain and body pain  which has been severe. She is taking oxycodone apap 10/325 up to 5 times daily.   She continues to take duloxetine 60 mg twice daily.  Her mood is doing ok.        She saw Dr Reilly on 1/18/22 - she continues to take Eliquis 5 mg twice daily.  She had cardiac PET stress 2/9/2022 which was negative for ischemia          No fever or chills. She has felt cold.  No nausea, vomiting, constipation, dysuria, hematuira.  She denies swallowing problems today.        She is living alone. She does not see her grandson very much. She has a niece, Kacie Mast who lives locally.      She is taking  coreg 25 mg twice daily  and furosemide 20 mg Monday, Wednesday and friday and Farxiga 10 mg daily for her hypertension and diastolic dysfunction.     She is taking requipo 0.5  mg at bedtime - for helping the restless leg     She had a right ureteroscopy with laser lithotripsy and stone basketing on 5/12/23.  Her pain from the stone has resolved.  She still has a urinary stent.         PAST MEDICAL HISTORY:   1. Diabetes  "mellitus   2. Hypertension   3. Hyperlipidemia   4. Left heart catheterization January 2007 which revealed luminal irregularities in the LAD, left circumflex and right coronary artery. She had diastolic dysfunction and patent renal arteries.   5. Sleep apnea   6. Obesity.   7. Nephrolithiasis status post lithotripsy.   8. Reflux - had nonerosive gastropathy on EGD September 2007. Gastritis on EGD 9/2010  9. Hidradenitis suppurativa.   10. History of diverticulitis with a hospitalized October 2007.   11. Migraines   12. Obesity.   13. 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    14. Total hysterectomy in 1969.   15. Cholecystectomy was done at that time as well.   16. 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   17. Colon polyps on colonscopy 11/2010 - due 2013   18. Hospitalization 12/10 for e coli sepsis due to left ureteral stone with left nephrostomy tube in [place], [place]   19. Left knee replacement 9/2012      MEDICATIONS and ALLERGIES: Updated on EPIC.     PHYSICAL EXAMINATION:          /64 (BP Location: Right arm, Patient Position: Sitting, BP Method: Large (Manual))   Pulse 73   Ht 5' 8" (1.727 m)   Wt 94.3 kg (207 lb 12.5 oz)   LMP  (LMP Unknown)   SpO2 99%   BMI 31.59 kg/m²        GENERAL: She is alert, oriented, no apparent distress. Affect within   normal limits.  Conjunctiva anicteric.  OP - possible thrush on tongue  NECK: Supple.   Respiratory: Effort normal. Lungs clear  HEART: Regular rate and rhythm without murmurs, gallops or rubs.   No lower extremity edema.      Rectal - stool dark - heme negative.      ASSESSMENT AND PLAN:       1. Diabetes -labs  2.  Kidney stones - asx  3. Hypertension with diastolic heart failure- stable. Negative PET stress. Currently not taking carvedilol. BP is excellent. Get back on carvedilol 25 mg twice daily. Decrease amlodipine to 10 mg 1/2 tablet daily.  On fursemide 20 mg Monday, " Wednesday and Friday.   on Farxiga 10  mg daily 4.  OA neck and lumbar spine - on oxycodone. stable  5.  Gout - asx off allopurinol  6.  Mood disorder -  on duloxetine 60 mg twice daily  7.  Swallowing disorder - s/p EGD with dilation on 11/11/21.   8. Asthma - stable   9. Gerd - on pantoprazole 40 mg  daily   10. Hyperlipidemia -on atorvastatin  11. Allergic rhinitis - stable  12. CRI -stable  13. Obesity - discussed diet, exercise and weight loss  14. CAD -risk factor modification  15. Aortic atherosclerosis and possible mesenteric artery stenosis- risk factor modification  16. Tortuous aorta - HTN controlled  17. Colon polyps - Colonoscopy in 8/2013 - 2 polyps. Flex sig 11/2013 - mild granular mucosa. Colonoscopy 6/2017 diverticulosis - no polyps due 2023. Defer due to age.   18. hyperparathryodisism -saw nephrology   19. .Restless leg syndrome - on roprinole    20. Stage 3b kidney disease - labs today  21. Diarrhea - has been on a lot of antibiotics and at risk for C diff. Will empirically treat with vancomycin 125 mg 4 times daily for 10 days     .  MMG 5/9/24        I will see her back in 2 months, Will see Margaret CHAVEZ in one month,  sooner if problems arise

## 2024-08-16 NOTE — PATIENT INSTRUCTIONS
Get back on carvedilol 25 mg one tablet twice daily  Decrease amlodipine to 10 mg 1/2 tablet (5 mg) once daily  Continue furosemdie 20 mg on Monday, Wednesday, Friday

## 2024-08-19 ENCOUNTER — TELEPHONE (OUTPATIENT)
Dept: INTERNAL MEDICINE | Facility: CLINIC | Age: 84
End: 2024-08-19
Payer: MEDICARE

## 2024-08-19 NOTE — TELEPHONE ENCOUNTER
Notified pt of message from PCP and pt verbalized understanding she said she will start taking 20 units of Lantus tonight.      ----- Message from Jeannine Arriaga MD sent at 8/18/2024 10:22 PM CDT -----  Please notify pt   Your blood work is doing fine  Your blood count, blood sugar, kidney function, liver function, thyroid function are fine  Blood sugars are doing very well with an average of 154  Decrease Lantus to 20 units once daily  Jeannine Arriaga M.D.

## 2024-09-20 DIAGNOSIS — E11.42 DIABETIC POLYNEUROPATHY ASSOCIATED WITH TYPE 2 DIABETES MELLITUS: ICD-10-CM

## 2024-09-20 RX ORDER — DULOXETIN HYDROCHLORIDE 60 MG/1
CAPSULE, DELAYED RELEASE ORAL
Qty: 180 CAPSULE | Refills: 3 | Status: SHIPPED | OUTPATIENT
Start: 2024-09-20

## 2024-09-20 NOTE — TELEPHONE ENCOUNTER
Provider Staff:  Action required for this patient    Requires labs      Please see care gap opportunities below in Care Due Message.    Thanks!  [facility]     Appointments      Date Provider   Last Visit   8/16/2024 Jeannine Arriaga MD   Next Visit   11/5/2024 Jeannine Arriaga MD     Refill Decision Note   Henrietta Villasenor  is requesting a refill authorization.  Brief Assessment and Rationale for Refill:  Approve     Medication Therapy Plan:         Pharmacist review requested: Yes   Comments:     Note composed:4:25 PM 09/20/2024

## 2024-09-20 NOTE — TELEPHONE ENCOUNTER
Refill Routing Note   Medication(s) are not appropriate for processing by [facility] for the following reason(s):        Drug-disease interaction    [facility] action(s):  Defer   Requires labs : Yes      Medication Therapy Plan: FATTY LIVER    Pharmacist review requested: Yes     Appointments  past 12m or future 3m with PCP    Date Provider   Last Visit   8/16/2024 Jeannine Arriaga MD   Next Visit   11/5/2024 Jeannine Arriaga MD   ED visits in past 90 days: 0        Note composed:2:08 PM 09/20/2024

## 2024-09-20 NOTE — TELEPHONE ENCOUNTER
Care Due:                  Date            Visit Type   Department     Provider  --------------------------------------------------------------------------------                                EP -                              PRIMARY      [facility] INTERNAL  Last Visit: 08-      CARE ([facility])   MEDICINE       FEDERICO MARCIAL                              EP                               PRIMARY      [facility] INTERNAL  Next Visit: 11-      CARE ([facility])   MEDICINE       FEDERICO MARCIAL                                                            Last  Test          Frequency    Reason                     Performed    Due Date  --------------------------------------------------------------------------------    Lipid Panel.  12 months..  atorvastatin.............  09- 09-    [facility] Embedded Care Due Messages. Reference number: [id number].   9/20/2024 1:56:18 PM CDT

## 2024-09-24 ENCOUNTER — OFFICE VISIT (OUTPATIENT)
Dept: INTERNAL MEDICINE | Facility: CLINIC | Age: 84
End: 2024-09-24
Payer: MEDICARE

## 2024-09-24 VITALS
DIASTOLIC BLOOD PRESSURE: 72 MMHG | HEIGHT: 68 IN | BODY MASS INDEX: 30.87 KG/M2 | RESPIRATION RATE: 18 BRPM | WEIGHT: 203.69 LBS | OXYGEN SATURATION: 98 % | TEMPERATURE: 97 F | SYSTOLIC BLOOD PRESSURE: 110 MMHG | HEART RATE: 78 BPM

## 2024-09-24 DIAGNOSIS — E11.42 TYPE 2 DIABETES MELLITUS WITH DIABETIC POLYNEUROPATHY, WITH LONG-TERM CURRENT USE OF INSULIN: ICD-10-CM

## 2024-09-24 DIAGNOSIS — Z79.4 TYPE 2 DIABETES MELLITUS WITH DIABETIC POLYNEUROPATHY, WITH LONG-TERM CURRENT USE OF INSULIN: ICD-10-CM

## 2024-09-24 DIAGNOSIS — R19.7 DIARRHEA, UNSPECIFIED TYPE: ICD-10-CM

## 2024-09-24 DIAGNOSIS — E78.2 MIXED HYPERLIPIDEMIA: Primary | ICD-10-CM

## 2024-09-24 DIAGNOSIS — E11.22 TYPE 2 DIABETES MELLITUS WITH STAGE 3 CHRONIC KIDNEY DISEASE, WITH LONG-TERM CURRENT USE OF INSULIN: ICD-10-CM

## 2024-09-24 DIAGNOSIS — G25.81 RLS (RESTLESS LEGS SYNDROME): ICD-10-CM

## 2024-09-24 DIAGNOSIS — Z79.4 TYPE 2 DIABETES MELLITUS WITH STAGE 3 CHRONIC KIDNEY DISEASE, WITH LONG-TERM CURRENT USE OF INSULIN: ICD-10-CM

## 2024-09-24 DIAGNOSIS — E11.42 DIABETIC POLYNEUROPATHY ASSOCIATED WITH TYPE 2 DIABETES MELLITUS: ICD-10-CM

## 2024-09-24 DIAGNOSIS — I10 ESSENTIAL HYPERTENSION: Chronic | ICD-10-CM

## 2024-09-24 DIAGNOSIS — N18.30 TYPE 2 DIABETES MELLITUS WITH STAGE 3 CHRONIC KIDNEY DISEASE, WITH LONG-TERM CURRENT USE OF INSULIN: ICD-10-CM

## 2024-09-24 DIAGNOSIS — Z23 NEED FOR VACCINATION: ICD-10-CM

## 2024-09-24 PROCEDURE — 3078F DIAST BP <80 MM HG: CPT | Mod: HCNC,CPTII,S$GLB, | Performed by: PHYSICIAN ASSISTANT

## 2024-09-24 PROCEDURE — 90653 IIV ADJUVANT VACCINE IM: CPT | Mod: HCNC,S$GLB,, | Performed by: PHYSICIAN ASSISTANT

## 2024-09-24 PROCEDURE — 3288F FALL RISK ASSESSMENT DOCD: CPT | Mod: HCNC,CPTII,S$GLB, | Performed by: PHYSICIAN ASSISTANT

## 2024-09-24 PROCEDURE — 1160F RVW MEDS BY RX/DR IN RCRD: CPT | Mod: HCNC,CPTII,S$GLB, | Performed by: PHYSICIAN ASSISTANT

## 2024-09-24 PROCEDURE — 99999 PR PBB SHADOW E&M-EST. PATIENT-LVL V: CPT | Mod: PBBFAC,HCNC,, | Performed by: PHYSICIAN ASSISTANT

## 2024-09-24 PROCEDURE — G0008 ADMIN INFLUENZA VIRUS VAC: HCPCS | Mod: HCNC,S$GLB,, | Performed by: PHYSICIAN ASSISTANT

## 2024-09-24 PROCEDURE — 1125F AMNT PAIN NOTED PAIN PRSNT: CPT | Mod: HCNC,CPTII,S$GLB, | Performed by: PHYSICIAN ASSISTANT

## 2024-09-24 PROCEDURE — 99214 OFFICE O/P EST MOD 30 MIN: CPT | Mod: HCNC,S$GLB,, | Performed by: PHYSICIAN ASSISTANT

## 2024-09-24 PROCEDURE — 1101F PT FALLS ASSESS-DOCD LE1/YR: CPT | Mod: HCNC,CPTII,S$GLB, | Performed by: PHYSICIAN ASSISTANT

## 2024-09-24 PROCEDURE — 1159F MED LIST DOCD IN RCRD: CPT | Mod: HCNC,CPTII,S$GLB, | Performed by: PHYSICIAN ASSISTANT

## 2024-09-24 PROCEDURE — 3074F SYST BP LT 130 MM HG: CPT | Mod: HCNC,CPTII,S$GLB, | Performed by: PHYSICIAN ASSISTANT

## 2024-09-24 RX ORDER — DULOXETIN HYDROCHLORIDE 60 MG/1
CAPSULE, DELAYED RELEASE ORAL
Qty: 180 CAPSULE | Refills: 3 | Status: SHIPPED | OUTPATIENT
Start: 2024-09-24

## 2024-09-24 RX ORDER — ATORVASTATIN CALCIUM 40 MG/1
40 TABLET, FILM COATED ORAL DAILY
Qty: 90 TABLET | Refills: 3 | Status: SHIPPED | OUTPATIENT
Start: 2024-09-24

## 2024-09-24 RX ORDER — ROPINIROLE 0.5 MG/1
0.5 TABLET, FILM COATED ORAL NIGHTLY
Qty: 90 TABLET | Refills: 3 | Status: SHIPPED | OUTPATIENT
Start: 2024-09-24

## 2024-09-24 RX ORDER — AMLODIPINE BESYLATE 5 MG/1
5 TABLET ORAL DAILY
Qty: 90 TABLET | Refills: 3 | Status: SHIPPED | OUTPATIENT
Start: 2024-09-24 | End: 2025-09-24

## 2024-09-24 RX ORDER — INSULIN ASPART 100 [IU]/ML
INJECTION, SOLUTION INTRAVENOUS; SUBCUTANEOUS
Qty: 9 ML | Refills: 3 | Status: SHIPPED | OUTPATIENT
Start: 2024-09-24

## 2024-09-24 RX ORDER — INSULIN GLARGINE 100 [IU]/ML
20 INJECTION, SOLUTION SUBCUTANEOUS DAILY
Start: 2024-09-24

## 2024-09-24 NOTE — PATIENT INSTRUCTIONS
Ricky Shrestha,     If you are due for any health screening(s) below please notify me so we can arrange them to be ordered and scheduled. Most healthy patients at your age complete them, but you are free to accept or refuse.     If you can't do it, I'll definitely understand. If you can, I'd certainly appreciate it!    All of your core healthy metrics are met.

## 2024-09-24 NOTE — PROGRESS NOTES
Subjective:       Patient ID: Henrietta Villasenor is a 84 y.o. female.        Chief Complaint: Follow-up    Henrietta Villasenor is an established patient of Jeannine Arriaga MD here today for follow up visit.    Diarrhea after eating, 5-10 minutes after, stool is dark, watery at times, soft at times  Vancomycin did not help at all  Started 1-2 months ago  No N/V  No fever  No abdominal pain    DM -    Trulicity 0.75 mg/wee  Lantus 20 U daily (reduced from 24 U 8/2024)  Novolog 10 U with meals, hold for blood sugar <100  Blood sugar 150-190 at home, no hypoglycemia      Lab Results       Component                Value               Date                       HGBA1C                   7.0 (H)             08/16/2024                 HGBA1C                   7.5 (H)             05/09/2024                 HGBA1C                   7.2 (H)             01/31/2024                      HTN -   Carvedilol 25 mg BID  Amlodipine 5 mg daily (reduced from 10 mg daily 8/2024)     Heart failure, CAD -   Lasix 20 mg M/W/F  Farxiga 10 mg added 5/2024 by cardiology     H/o intermittent cough, not currently, h/o thrush tx with diflucan     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   HENT:  Negative for congestion and sore throat.    Eyes:  Negative for visual disturbance.   Respiratory:  Negative for cough, chest tightness and shortness of breath.    Cardiovascular:  Negative for chest pain, palpitations and leg swelling.   Gastrointestinal:  Positive for diarrhea. Negative for abdominal pain, blood in stool, constipation, nausea and vomiting.   Genitourinary:  Negative for dysuria, frequency,  hematuria and urgency.   Musculoskeletal:  Negative for arthralgias and back pain.   Skin:  Negative for rash.   Neurological:  Negative for dizziness, syncope, weakness and headaches.   Psychiatric/Behavioral:  Negative for dysphoric mood and sleep disturbance. The patient is not nervous/anxious.        Objective:      Physical Exam  Vitals and nursing note reviewed.   Constitutional:       Appearance: Normal appearance. She is well-developed.   HENT:      Head: Normocephalic.      Right Ear: External ear normal.      Left Ear: External ear normal.   Eyes:      Pupils: Pupils are equal, round, and reactive to light.   Cardiovascular:      Rate and Rhythm: Normal rate and regular rhythm.      Heart sounds: Normal heart sounds. No murmur heard.     No friction rub. No gallop.   Pulmonary:      Effort: Pulmonary effort is normal. No respiratory distress.      Breath sounds: Normal breath sounds.   Abdominal:      Palpations: Abdomen is soft.      Tenderness: There is no abdominal tenderness.   Skin:     General: Skin is warm and dry.   Neurological:      Mental Status: She is alert.         Assessment:       1. Mixed hyperlipidemia    2. Essential hypertension    3. Diabetic polyneuropathy associated with type 2 diabetes mellitus    4. Type 2 diabetes mellitus with diabetic polyneuropathy, with long-term current use of insulin    5. Type 2 diabetes mellitus with stage 3 chronic kidney disease, with long-term current use of insulin    6. RLS (restless legs syndrome)    7. Diarrhea, unspecified type    8. Need for vaccination        Plan:       Henrietta was seen today for follow-up.    Diagnoses and all orders for this visit:    Mixed hyperlipidemia  -     Comprehensive Metabolic Panel; Future  -     Lipid Panel; Future    Essential hypertension  -     REFILL: amLODIPine (NORVASC) 5 MG tablet; Take 1 tablet (5 mg total) by mouth once daily.    Diabetic polyneuropathy associated with type 2 diabetes mellitus  -     REFILL:  DULoxetine (CYMBALTA) 60 MG capsule; Take 1 capsule (60 mg total) by mouth 2 (two) times daily.  -     REFILL: insulin aspart U-100 (NOVOLOG) 100 unit/mL (3 mL) InPn pen; Inject 10 units before meals, hold if sugar is less than 100. Max daily 45 units.    Type 2 diabetes mellitus with diabetic polyneuropathy, with long-term current use of insulin  -     REFILL: insulin glargine U-100, Lantus, (LANTUS SOLOSTAR U-100 INSULIN) 100 unit/mL (3 mL) InPn pen; Inject 20 Units into the skin once daily.  -     Microalbumin/creatinine urine ratio    Type 2 diabetes mellitus with stage 3 chronic kidney disease, with long-term current use of insulin  -     REFILL: insulin glargine U-100, Lantus, (LANTUS SOLOSTAR U-100 INSULIN) 100 unit/mL (3 mL) InPn pen; Inject 20 Units into the skin once daily.    RLS (restless legs syndrome)  -     REFILL: rOPINIRole (REQUIP) 0.5 MG tablet; Take 1 tablet (0.5 mg total) by mouth every evening.    Diarrhea, unspecified type  -     Giardia / Cryptosporidum, EIA; Future  -     Clostridium difficile EIA; Future  -     Stool culture; Future  -     Stool Exam-Ova,Cysts,Parasites; Future  -     WBC, Stool; Future  -     Occult blood x 1, stool; Future  -     CBC Auto Differential; Future    Need for vaccination  -     Influenza - Trivalent (Adjuvanted)    Other orders  -     REFILL: atorvastatin (LIPITOR) 40 MG tablet; Take 1 tablet (40 mg total) by mouth once daily.  -     REFILL: apixaban (ELIQUIS) 5 mg Tab; Take 1 tablet (5 mg total) by mouth 2 (two) times daily.    Refill medications today  Check stool studies to eval diarrhea - consider GI consult  Schedule fasting lab work  Keep f/u with Dr. Arriaga next month    She had all medication bottles today, reviewed and reconciled, all matched list  She did not have insulin or trulicity with her    Flu and covid vaccines today    Pt has been given instructions populated from patient instructions database and has verbalized understanding of the after  "visit summary and information contained wherein.    Follow up with a primary care provider. May go to ER for acute shortness of breath, lightheadedness, fever, or any other emergent complaints or changes in condition.    "This note will be shared with the patient"    Future Appointments   Date Time Provider Department Center   9/26/2024 11:00 AM LAB, APPOINTMENT [facility]  [facility]   11/5/2024  1:30 PM Jeannine Arriaga MD [facility]   12/3/2024  1:00 PM Margaret Cabrera PA-C [facility]   1/3/2025  1:30 PM Jeannine Arriaga MD [facility]                 "

## 2024-09-26 ENCOUNTER — LAB VISIT (OUTPATIENT)
Dept: LAB | Facility: HOSPITAL | Age: 84
End: 2024-09-26
Payer: MEDICARE

## 2024-09-26 DIAGNOSIS — E78.2 MIXED HYPERLIPIDEMIA: ICD-10-CM

## 2024-09-26 DIAGNOSIS — R19.7 DIARRHEA, UNSPECIFIED TYPE: ICD-10-CM

## 2024-09-26 LAB
ALBUMIN SERPL BCP-MCNC: 3.1 G/DL (ref 3.5–5.2)
ALP SERPL-CCNC: 91 U/L (ref 55–135)
ALT SERPL W/O P-5'-P-CCNC: 21 U/L (ref 10–44)
ANION GAP SERPL CALC-SCNC: 10 MMOL/L (ref 8–16)
AST SERPL-CCNC: 23 U/L (ref 10–40)
BASOPHILS # BLD AUTO: 0.07 K/UL (ref 0–0.2)
BASOPHILS NFR BLD: 0.9 % (ref 0–1.9)
BILIRUB SERPL-MCNC: 0.5 MG/DL (ref 0.1–1)
BUN SERPL-MCNC: 18 MG/DL (ref 8–23)
CALCIUM SERPL-MCNC: 9.2 MG/DL (ref 8.7–10.5)
CHLORIDE SERPL-SCNC: 101 MMOL/L (ref 95–110)
CHOLEST SERPL-MCNC: 197 MG/DL (ref 120–199)
CHOLEST/HDLC SERPL: 4.8 {RATIO} (ref 2–5)
CO2 SERPL-SCNC: 25 MMOL/L (ref 23–29)
CREAT SERPL-MCNC: 1.3 MG/DL (ref 0.5–1.4)
DIFFERENTIAL METHOD BLD: ABNORMAL
EOSINOPHIL # BLD AUTO: 0.2 K/UL (ref 0–0.5)
EOSINOPHIL NFR BLD: 3 % (ref 0–8)
ERYTHROCYTE [DISTWIDTH] IN BLOOD BY AUTOMATED COUNT: 14.9 % (ref 11.5–14.5)
EST. GFR  (NO RACE VARIABLE): 40.5 ML/MIN/1.73 M^2
GLUCOSE SERPL-MCNC: 371 MG/DL (ref 70–110)
HCT VFR BLD AUTO: 35.7 % (ref 37–48.5)
HDLC SERPL-MCNC: 41 MG/DL (ref 40–75)
HDLC SERPL: 20.8 % (ref 20–50)
HGB BLD-MCNC: 11.3 G/DL (ref 12–16)
IMM GRANULOCYTES # BLD AUTO: 0.04 K/UL (ref 0–0.04)
IMM GRANULOCYTES NFR BLD AUTO: 0.5 % (ref 0–0.5)
LDLC SERPL CALC-MCNC: 134.4 MG/DL (ref 63–159)
LYMPHOCYTES # BLD AUTO: 1.9 K/UL (ref 1–4.8)
LYMPHOCYTES NFR BLD: 24.6 % (ref 18–48)
MCH RBC QN AUTO: 27.3 PG (ref 27–31)
MCHC RBC AUTO-ENTMCNC: 31.7 G/DL (ref 32–36)
MCV RBC AUTO: 86 FL (ref 82–98)
MONOCYTES # BLD AUTO: 1.1 K/UL (ref 0.3–1)
MONOCYTES NFR BLD: 13.6 % (ref 4–15)
NEUTROPHILS # BLD AUTO: 4.5 K/UL (ref 1.8–7.7)
NEUTROPHILS NFR BLD: 57.4 % (ref 38–73)
NONHDLC SERPL-MCNC: 156 MG/DL
NRBC BLD-RTO: 0 /100 WBC
PLATELET # BLD AUTO: 175 K/UL (ref 150–450)
PMV BLD AUTO: 11.5 FL (ref 9.2–12.9)
POTASSIUM SERPL-SCNC: 3.7 MMOL/L (ref 3.5–5.1)
PROT SERPL-MCNC: 7 G/DL (ref 6–8.4)
RBC # BLD AUTO: 4.14 M/UL (ref 4–5.4)
SODIUM SERPL-SCNC: 136 MMOL/L (ref 136–145)
TRIGL SERPL-MCNC: 108 MG/DL (ref 30–150)
WBC # BLD AUTO: 7.79 K/UL (ref 3.9–12.7)

## 2024-09-26 PROCEDURE — 80061 LIPID PANEL: CPT | Mod: HCNC | Performed by: PHYSICIAN ASSISTANT

## 2024-09-26 PROCEDURE — 85025 COMPLETE CBC W/AUTO DIFF WBC: CPT | Mod: HCNC | Performed by: PHYSICIAN ASSISTANT

## 2024-09-26 PROCEDURE — 36415 COLL VENOUS BLD VENIPUNCTURE: CPT | Mod: HCNC | Performed by: PHYSICIAN ASSISTANT

## 2024-09-26 PROCEDURE — 80053 COMPREHEN METABOLIC PANEL: CPT | Mod: HCNC | Performed by: PHYSICIAN ASSISTANT

## 2024-10-02 DIAGNOSIS — Z78.0 MENOPAUSE: ICD-10-CM

## 2024-10-03 ENCOUNTER — TELEPHONE (OUTPATIENT)
Dept: INTERNAL MEDICINE | Facility: CLINIC | Age: 84
End: 2024-10-03
Payer: MEDICARE

## 2024-10-03 NOTE — TELEPHONE ENCOUNTER
----- Message from Margaret Cabrera PA-C sent at 10/3/2024  6:50 AM CDT -----  Please call patient to let her know that stool studies are negative for infection   dizziness, neuro exam nl - FS wnl, sx improved after meclizine - strict return precautions discussed, rec outpt pmd/ent f/u

## 2024-10-03 NOTE — TELEPHONE ENCOUNTER
----- Message from Margaret Cabrera PA-C sent at 9/30/2024  7:18 AM CDT -----  Please call patient  Blood sugar is elevated but she had been off her insulin - she should have resumed as I sent refill  Anemia stable  Cholesterol higher than prior - check to make sure she has been taking her lipitor

## 2024-10-04 ENCOUNTER — TELEPHONE (OUTPATIENT)
Dept: INTERNAL MEDICINE | Facility: CLINIC | Age: 84
End: 2024-10-04
Payer: MEDICARE

## 2024-10-08 ENCOUNTER — TELEPHONE (OUTPATIENT)
Dept: INTERNAL MEDICINE | Facility: CLINIC | Age: 84
End: 2024-10-08
Payer: MEDICARE

## 2024-10-08 NOTE — TELEPHONE ENCOUNTER
----- Message from Jeannine Arriaga MD sent at 10/6/2024  8:09 PM CDT -----  She has an apt with me on 11/5/24 - please remind her and stress to her that she needs to take her medications  SF  ----- Message -----  From: Larry Ackerman LPN  Sent: 10/4/2024   1:57 PM CDT  To: Jeannine Arriaga MD    Pt states that she was supposed to have an apt with pcp this month however someone canceled it. Pt would like to know if pcp can work her in for apt. Pt doesn't have any issues she just wants to see pcp.  ----- Message -----  From: Margaret Cabrera PA-C  Sent: 9/30/2024   7:18 AM CDT  To: Nurse Deborah Welch    Please call patient  Blood sugar is elevated but she had been off her insulin - she should have resumed as I sent refill  Anemia stable  Cholesterol higher than prior - check to make sure she has been taking her lipitor

## 2024-11-01 DIAGNOSIS — G89.4 CHRONIC PAIN SYNDROME: Primary | ICD-10-CM

## 2024-11-01 RX ORDER — OXYCODONE AND ACETAMINOPHEN 10; 325 MG/1; MG/1
1 TABLET ORAL
Qty: 150 TABLET | Refills: 0 | Status: SHIPPED | OUTPATIENT
Start: 2024-11-01

## 2024-11-05 ENCOUNTER — OFFICE VISIT (OUTPATIENT)
Dept: INTERNAL MEDICINE | Facility: CLINIC | Age: 84
End: 2024-11-05
Payer: MEDICARE

## 2024-11-05 ENCOUNTER — HOSPITAL ENCOUNTER (OUTPATIENT)
Dept: RADIOLOGY | Facility: HOSPITAL | Age: 84
Discharge: HOME | End: 2024-11-05
Attending: INTERNAL MEDICINE
Payer: MEDICARE

## 2024-11-05 VITALS
SYSTOLIC BLOOD PRESSURE: 104 MMHG | WEIGHT: 204.69 LBS | HEIGHT: 68 IN | OXYGEN SATURATION: 97 % | HEART RATE: 52 BPM | BODY MASS INDEX: 31.02 KG/M2 | DIASTOLIC BLOOD PRESSURE: 62 MMHG

## 2024-11-05 DIAGNOSIS — K21.9 GASTROESOPHAGEAL REFLUX DISEASE, UNSPECIFIED WHETHER ESOPHAGITIS PRESENT: ICD-10-CM

## 2024-11-05 DIAGNOSIS — E66.01 MORBID OBESITY: ICD-10-CM

## 2024-11-05 DIAGNOSIS — M47.816 ARTHRITIS, LUMBAR SPINE: Primary | ICD-10-CM

## 2024-11-05 DIAGNOSIS — M25.519 SHOULDER PAIN, UNSPECIFIED CHRONICITY, UNSPECIFIED LATERALITY: ICD-10-CM

## 2024-11-05 DIAGNOSIS — Z79.4 TYPE 2 DIABETES MELLITUS WITH DIABETIC POLYNEUROPATHY, WITH LONG-TERM CURRENT USE OF INSULIN: ICD-10-CM

## 2024-11-05 DIAGNOSIS — I10 ESSENTIAL HYPERTENSION: ICD-10-CM
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
## 2024-11-05 DIAGNOSIS — E11.42 TYPE 2 DIABETES MELLITUS WITH DIABETIC POLYNEUROPATHY, WITH LONG-TERM CURRENT USE OF INSULIN: ICD-10-CM

## 2024-11-05 DIAGNOSIS — G25.81 RLS (RESTLESS LEGS SYNDROME): ICD-10-CM

## 2024-11-05 PROCEDURE — 73030 X-RAY EXAM OF SHOULDER: CPT | Mod: TC,HCNC,LT

## 2024-11-05 PROCEDURE — 99999 PR PBB SHADOW E&M-EST. PATIENT-LVL V: CPT | Mod: PBBFAC,HCNC,, | Performed by: INTERNAL MEDICINE

## 2024-11-05 PROCEDURE — 73030 X-RAY EXAM OF SHOULDER: CPT | Mod: 26,HCNC,LT, | Performed by: RADIOLOGY

## 2024-11-05 RX ORDER — PANTOPRAZOLE SODIUM 40 MG/1
40 TABLET, DELAYED RELEASE ORAL DAILY
Qty: 90 TABLET | Refills: 1 | Status: SHIPPED | OUTPATIENT
Start: 2024-11-05

## 2024-11-05 RX ORDER — HYDROXYZINE HYDROCHLORIDE 25 MG/1
25 TABLET, FILM COATED ORAL 3 TIMES DAILY PRN
Qty: 90 TABLET | Refills: 3 | Status: SHIPPED | OUTPATIENT
Start: 2024-11-05

## 2024-11-05 NOTE — PROGRESS NOTES
CHIEF COMPLAINT:   Follow up of multple problems.      HISTORY OF PRESENT ILLNESS: This is a 84-year-old woman who presents for follow up of above    She fell in her kitchen 2 weeks ago- she went to sit in the chair and slipped off the chair. She fell on the floor - buttocks hit first then her left shoulder.  Her left shoulder is still bothering her.      She brings her medication bottles with her today and has the correct medications. She does not have her insulins and trulicity with her. She reports she has been out of the once a week shot (trulicity). She does not have pantoprazole bottle. She is not taking Farxiga      She is taking Lantus 20 units once in the evening.  She takes novolog 10 units with meals. She is taking Trulicity 0.75 mg once a week on a Sunday.      Diarrhea resolved.      She is taking magnesium oxide 400 mg daily     She continues to have itching on her arms and neck. Comes and goes.       She continues to have neck and shoulder pain, back pain. She complains of back pain and body pain  which has been severe. She is taking oxycodone apap 10/325 up to 5 times daily.   She continues to take duloxetine 60 mg twice daily.  Her mood is doing ok.        She saw Dr Reilly on 1/18/22 - she continues to take Eliquis 5 mg twice daily.  She had cardiac PET stress 2/9/2022 which was negative for ischemia          No fever or chills. She has felt cold.  No nausea, vomiting, constipation, dysuria, hematuira.  She denies swallowing problems today.        She is living alone. Granddaughter, age 18, stays with her most of the time.  Grandson is now living in [place].  She has a niece, Kacie Mast who lives locally.      She is taking amlodipine 5 mg once daily, coreg 25 mg twice daily  and furosemide 20 mg Monday, Wednesday and friday for her hypertension and diastolic dysfunction.     She is taking requip 0.5  mg at bedtime - for helping the restless leg     She had a right ureteroscopy with laser lithotripsy  "and stone basketing on 5/12/23.  Her pain from the stone has resolved.  She still has a urinary stent.         PAST MEDICAL HISTORY:   1. Diabetes mellitus   2. Hypertension   3. Hyperlipidemia   4. Left heart catheterization January 2007 which revealed luminal irregularities in the LAD, left circumflex and right coronary artery. She had diastolic dysfunction and patent renal arteries.   5. Sleep apnea   6. Obesity.   7. Nephrolithiasis status post lithotripsy.   8. Reflux - had nonerosive gastropathy on EGD September 2007. Gastritis on EGD 9/2010  9. Hidradenitis suppurativa.   10. History of diverticulitis with a hospitalized October 2007.   11. Migraines   12. Obesity.   13. 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    14. Total hysterectomy in 1969.   15. Cholecystectomy was done at that time as well.   16. 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   17. Colon polyps on colonscopy 11/2010 - due 2013   18. Hospitalization 12/10 for e coli sepsis due to left ureteral stone with left nephrostomy tube in [place], [place]   19. Left knee replacement 9/2012      MEDICATIONS and ALLERGIES: Updated on EPIC.     PHYSICAL EXAMINATION:       /62 (BP Location: Right arm, Patient Position: Sitting)   Pulse (!) 52   Ht 5' 8" (1.727 m)   Wt 92.8 kg (204 lb 11.2 oz)   LMP  (LMP Unknown)   SpO2 97%   BMI 31.12 kg/m²          GENERAL: She is alert, oriented, no apparent distress. Affect within   normal limits.  Conjunctiva anicteric.  OP - possible thrush on tongue  NECK: Supple.   Respiratory: Effort normal. Lungs clear  HEART: Regular rate and rhythm without murmurs, gallops or rubs.   No lower extremity edema.     Labs 8/16/24 reviewed       ASSESSMENT AND PLAN:       1. Diabetes -labs  2.  Kidney stones - asx  3. Hypertension with diastolic heart failure- stable. Negative PET stress.   On fursemide 20 mg Monday, Wednesday and Friday.     4.  " OA neck and lumbar spine - on oxycodone. Stable.  Due to recent fall in mobility issues- home health physical therapy and .   5.  Gout - asx off allopurinol  6.  Mood disorder -  on duloxetine 60 mg twice daily  7.  Swallowing disorder - s/p EGD with dilation on 11/11/21.   8. Asthma - stable   9. Gerd - get on pantoprazole 40 mg  daily   10. Hyperlipidemia -on atorvastatin  11. Allergic rhinitis - stable  12. CRI -stable  13. Obesity - discussed diet, exercise and weight loss  14. CAD -risk factor modification  15. Aortic atherosclerosis and possible mesenteric artery stenosis- risk factor modification  16. Tortuous aorta - HTN controlled  17. Colon polyps - Colonoscopy in 8/2013 - 2 polyps. Flex sig 11/2013 - mild granular mucosa. Colonoscopy 6/2017 diverticulosis - no polyps due 2023. Defer due to age.   18. hyperparathryodisism -saw nephrology   19. .Restless leg syndrome - on roprinole    20. Stage 3b kidney disease -stable  21. Shoulder pain - occupational therapy at home. Xray left shoudler.  Tender over deltoid  22. Morbid obesity - working at diet, exercise and weight loss      MMG 5/9/24        I will see her back in 2 months, Will see Margaret CHAVEZ in one month,  sooner if problems arise    Visit today included increased complexity associated with the care of the episodic problems- shoulder pain, gait abnormality,  addressed and managing the longitudinal care of the patient due to the serious and/or complex managed problem(s) diabetes, hypertension, chronic back pain, gout, kidney disease, restless leg synrome

## 2024-11-07 PROCEDURE — G0180 MD CERTIFICATION HHA PATIENT: HCPCS | Mod: ,,, | Performed by: INTERNAL MEDICINE

## 2024-11-11 ENCOUNTER — TELEPHONE (OUTPATIENT)
Dept: INTERNAL MEDICINE | Facility: CLINIC | Age: 84
End: 2024-11-11
Payer: MEDICARE

## 2024-11-11 NOTE — TELEPHONE ENCOUNTER
----- Message from Jeannine Arriaga MD sent at 11/10/2024  9:22 PM CST -----  Please notify pt that xray of her left shoulder reveals arthritis. No fracture seen  Jeannine Arriaga M.D.

## 2024-11-11 NOTE — TELEPHONE ENCOUNTER
----- Message from Jeannine Arriaga MD sent at 11/10/2024  9:10 PM CST -----  Please notify pt  Your blood work reveals that  blood count,  kidney function, liver function,are fine  BLood sugars are a little higher - averaging 194 - watch your diet.   Jeannine Arriaga M.D.

## 2024-11-29 ENCOUNTER — EXTERNAL HOME HEALTH (OUTPATIENT)
Dept: HOME HEALTH SERVICES | Facility: HOSPITAL | Age: 84
End: 2024-11-29
Payer: MEDICARE

## 2024-12-02 DIAGNOSIS — G89.4 CHRONIC PAIN SYNDROME: ICD-10-CM

## 2024-12-02 RX ORDER — OXYCODONE AND ACETAMINOPHEN 10; 325 MG/1; MG/1
1 TABLET ORAL
Qty: 150 TABLET | Refills: 0 | Status: SHIPPED | OUTPATIENT
Start: 2024-12-02

## 2024-12-02 NOTE — TELEPHONE ENCOUNTER
No care due was identified.  [facility] Embedded Care Due Messages. Reference number: [id number].   12/02/2024 9:59:44 AM CST

## 2024-12-03 ENCOUNTER — DOCUMENTATION ONLY (OUTPATIENT)
Dept: INTERNAL MEDICINE | Facility: CLINIC | Age: 84
End: 2024-12-03

## 2024-12-03 ENCOUNTER — OFFICE VISIT (OUTPATIENT)
Dept: INTERNAL MEDICINE | Facility: CLINIC | Age: 84
End: 2024-12-03
Payer: MEDICARE

## 2024-12-03 VITALS
OXYGEN SATURATION: 97 % | SYSTOLIC BLOOD PRESSURE: 128 MMHG | TEMPERATURE: 98 F | RESPIRATION RATE: 18 BRPM | HEIGHT: 68 IN | WEIGHT: 208.75 LBS | HEART RATE: 80 BPM | BODY MASS INDEX: 31.64 KG/M2 | DIASTOLIC BLOOD PRESSURE: 86 MMHG

## 2024-12-03 DIAGNOSIS — E11.9 TYPE 2 DIABETES MELLITUS WITHOUT COMPLICATION, WITH LONG-TERM CURRENT USE OF INSULIN: ICD-10-CM

## 2024-12-03 DIAGNOSIS — G89.4 CHRONIC PAIN SYNDROME: ICD-10-CM

## 2024-12-03 DIAGNOSIS — Z79.4 TYPE 2 DIABETES MELLITUS WITHOUT COMPLICATION, WITH LONG-TERM CURRENT USE OF INSULIN: ICD-10-CM

## 2024-12-03 DIAGNOSIS — Z23 NEED FOR VACCINE FOR TD (TETANUS-DIPHTHERIA): ICD-10-CM

## 2024-12-03 DIAGNOSIS — H61.23 BILATERAL IMPACTED CERUMEN: Primary | ICD-10-CM

## 2024-12-03 DIAGNOSIS — I10 ESSENTIAL HYPERTENSION: Chronic | ICD-10-CM

## 2024-12-03 PROCEDURE — 99214 OFFICE O/P EST MOD 30 MIN: CPT | Mod: HCNC,S$GLB,, | Performed by: PHYSICIAN ASSISTANT

## 2024-12-03 PROCEDURE — 1160F RVW MEDS BY RX/DR IN RCRD: CPT | Mod: HCNC,CPTII,S$GLB, | Performed by: PHYSICIAN ASSISTANT

## 2024-12-03 PROCEDURE — 99999 PR PBB SHADOW E&M-EST. PATIENT-LVL V: CPT | Mod: PBBFAC,HCNC,, | Performed by: PHYSICIAN ASSISTANT

## 2024-12-03 PROCEDURE — 1126F AMNT PAIN NOTED NONE PRSNT: CPT | Mod: HCNC,CPTII,S$GLB, | Performed by: PHYSICIAN ASSISTANT

## 2024-12-03 PROCEDURE — 3079F DIAST BP 80-89 MM HG: CPT | Mod: HCNC,CPTII,S$GLB, | Performed by: PHYSICIAN ASSISTANT

## 2024-12-03 PROCEDURE — 3074F SYST BP LT 130 MM HG: CPT | Mod: HCNC,CPTII,S$GLB, | Performed by: PHYSICIAN ASSISTANT

## 2024-12-03 PROCEDURE — 1159F MED LIST DOCD IN RCRD: CPT | Mod: HCNC,CPTII,S$GLB, | Performed by: PHYSICIAN ASSISTANT

## 2024-12-03 RX ORDER — DULAGLUTIDE 0.75 MG/.5ML
0.75 INJECTION, SOLUTION SUBCUTANEOUS
Qty: 4 PEN | Refills: 2 | Status: SHIPPED | OUTPATIENT
Start: 2024-12-03

## 2024-12-03 NOTE — PROGRESS NOTES
Subjective:       Patient ID: Henrietta Villasenor is a 84 y.o. female.        Chief Complaint: Follow-up    Henrietta Villasenor is an established patient of Jeannine Arriaga MD here today for follow up visit.     Prior diarrhea resolved.  She does not bring medication bottles or insulin in today for review.     DM -    Trulicity 0.75 mg/week - she has been out of this for months and not taking, reviewed that she has refills at pharmacy but she was unaware  Lantus 20 U daily (reduced from 24 U 8/2024)  Novolog 10 U with meals, hold for blood sugar <100  Blood sugar 120-170 at home, no hypoglycemia      Lab Results       Component                Value               Date                       HGBA1C                   8.4 (H)             11/05/2024                 HGBA1C                   7.0 (H)             08/16/2024                 HGBA1C                   7.5 (H)             05/09/2024                      HTN -   Carvedilol 25 mg BID  Amlodipine 5 mg daily (reduced from 10 mg daily 8/2024)     Heart failure, CAD -   Lasix 20 mg M/W/F  Farxiga 10 mg added 5/2024 by cardiology - per Dr. 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   HENT:  Negative for congestion and sore throat.    Eyes:  Negative for visual disturbance.   Respiratory:  Negative for cough, chest tightness and shortness of breath.    Cardiovascular:  Negative for chest pain, palpitations and leg swelling.   Gastrointestinal:  Negative for abdominal pain, blood in stool, constipation, diarrhea,  nausea and vomiting.   Genitourinary:  Negative for dysuria, frequency, hematuria and urgency.   Musculoskeletal:  Positive for arthralgias and back pain.   Skin:  Negative for rash.   Neurological:  Negative for dizziness, syncope, weakness and headaches.   Psychiatric/Behavioral:  Negative for dysphoric mood and sleep disturbance. The patient is not nervous/anxious.        Objective:      Physical Exam  Vitals and nursing note reviewed.   Constitutional:       Appearance: Normal appearance. She is well-developed.   HENT:      Head: Normocephalic.      Ears:      Comments: Dry wax in bilateral canals, impacted in both ears  Eyes:      Pupils: Pupils are equal, round, and reactive to light.   Cardiovascular:      Rate and Rhythm: Normal rate and regular rhythm.      Heart sounds: Normal heart sounds. No murmur heard.     No friction rub. No gallop.   Pulmonary:      Effort: Pulmonary effort is normal. No respiratory distress.      Breath sounds: Normal breath sounds.   Abdominal:      Palpations: Abdomen is soft.      Tenderness: There is no abdominal tenderness.   Skin:     General: Skin is warm and dry.   Neurological:      Mental Status: She is alert.         Assessment:       1. Bilateral impacted cerumen    2. Type 2 diabetes mellitus without complication, with long-term current use of insulin    3. Need for vaccine for Td (tetanus-diphtheria)    4. Chronic pain syndrome    5. Essential hypertension        Plan:       Henrietta was seen today for follow-up.    Diagnoses and all orders for this visit:    Bilateral impacted cerumen, hearing loss - notes chronic hearing loss and desires evaluation for hearing aids, also needs cerumen removal  -     Ambulatory referral/consult to ENT; Future    Type 2 diabetes mellitus without complication, with long-term current use of insulin - she has been off trulicity for 2 months, refill today, resume as last A1C was high  -     REFILL: dulaglutide (TRULICITY) 0.75 mg/0.5 mL pen  "injector; Inject 0.75 mg into the skin every 7 days.    Need for vaccine for Td (tetanus-diphtheria) - due for routine tetanus booster    Chronic pain syndrome - stable and controlled, continue current regimen, will  oxycodone today    Essential hypertension - stable and controlled, continue current regimen    F/u next month as scheduled with Dr. Arriaga    Bring pill bottles next appointment    Pt has been given instructions populated from patient instructions database and has verbalized understanding of the after visit summary and information contained wherein.    Follow up with a primary care provider. May go to ER for acute shortness of breath, lightheadedness, fever, or any other emergent complaints or changes in condition.    "This note will be shared with the patient"    Future Appointments   Date Time Provider Department Center   1/3/2025  1:30 PM Jeannine Arriaga MD [facility]   2/4/2025  1:00 PM Margaret Cabrera PA-C [facility] [place]   3/11/2025  1:30 PM Jeannine Arriaga MD [facility] [place]                 "

## 2024-12-03 NOTE — PROGRESS NOTES
NDC 09170-994-60    Lot #: [id number]    Expiration date: 09/28/2025      Tdap shot was given on 12/3/2024

## 2024-12-12 ENCOUNTER — OFFICE VISIT (OUTPATIENT)
Dept: INTERNAL MEDICINE | Facility: CLINIC | Age: 84
End: 2024-12-12
Payer: MEDICARE

## 2024-12-12 ENCOUNTER — HOSPITAL ENCOUNTER (OUTPATIENT)
Dept: RADIOLOGY | Facility: HOSPITAL | Age: 84
Discharge: HOME | End: 2024-12-12
Attending: NURSE PRACTITIONER
Payer: MEDICARE

## 2024-12-12 VITALS
DIASTOLIC BLOOD PRESSURE: 72 MMHG | SYSTOLIC BLOOD PRESSURE: 136 MMHG | BODY MASS INDEX: 31.3 KG/M2 | OXYGEN SATURATION: 97 % | HEART RATE: 80 BPM | WEIGHT: 206.56 LBS | HEIGHT: 68 IN

## 2024-12-12 DIAGNOSIS — J18.9 ATYPICAL PNEUMONIA: Primary | ICD-10-CM

## 2024-12-12 DIAGNOSIS — Z79.4 TYPE 2 DIABETES MELLITUS WITHOUT COMPLICATION, WITH LONG-TERM CURRENT USE OF INSULIN: ICD-10-CM

## 2024-12-12 DIAGNOSIS — J98.8 RESPIRATORY INFECTION: ICD-10-CM

## 2024-12-12 DIAGNOSIS — E11.9 TYPE 2 DIABETES MELLITUS WITHOUT COMPLICATION, WITH LONG-TERM CURRENT USE OF INSULIN: ICD-10-CM

## 2024-12-12 LAB
CTP QC/QA: YES
CTP QC/QA: YES
POC MOLECULAR INFLUENZA A AGN: NEGATIVE
POC MOLECULAR INFLUENZA B AGN: NEGATIVE
SARS-COV-2 RDRP RESP QL NAA+PROBE: NEGATIVE

## 2024-12-12 PROCEDURE — 87502 INFLUENZA DNA AMP PROBE: CPT | Mod: QW,HCNC,S$GLB, | Performed by: NURSE PRACTITIONER

## 2024-12-12 PROCEDURE — 71046 X-RAY EXAM CHEST 2 VIEWS: CPT | Mod: TC,HCNC

## 2024-12-12 PROCEDURE — 99999 PR PBB SHADOW E&M-EST. PATIENT-LVL V: CPT | Mod: PBBFAC,HCNC,, | Performed by: NURSE PRACTITIONER

## 2024-12-12 PROCEDURE — 71046 X-RAY EXAM CHEST 2 VIEWS: CPT | Mod: 26,HCNC,, | Performed by: RADIOLOGY

## 2024-12-12 RX ORDER — AZITHROMYCIN 250 MG/1
TABLET, FILM COATED ORAL
Qty: 6 TABLET | Refills: 0 | Status: SHIPPED | OUTPATIENT
Start: 2024-12-12 | End: 2024-12-17

## 2024-12-12 RX ORDER — AMOXICILLIN AND CLAVULANATE POTASSIUM 875; 125 MG/1; MG/1
1 TABLET, FILM COATED ORAL EVERY 12 HOURS
Qty: 14 TABLET | Refills: 0 | Status: SHIPPED | OUTPATIENT
Start: 2024-12-12 | End: 2024-12-19

## 2024-12-12 RX ORDER — AZELASTINE 1 MG/ML
2 SPRAY, METERED NASAL 2 TIMES DAILY PRN
Qty: 30 ML | Refills: 0 | Status: SHIPPED | OUTPATIENT
Start: 2024-12-12

## 2024-12-12 NOTE — PROGRESS NOTES
"Subjective     Patient ID: Henrietta Villasenor is a 84 y.o. female.  /72 (BP Location: Left arm, Patient Position: Sitting)   Pulse 80   Ht 5' 8" (1.727 m)   Wt 93.7 kg (206 lb 9.1 oz)   LMP  (LMP Unknown)   SpO2 97%   BMI 31.41 kg/m²      Chief Complaint: Cough, Nasal Congestion, and cold    Presenting with nasal congestion, cough, and shortness of breath x 1.5 weeks. Patient presents today because she feels like she has a "cold." She has been using coricidin HBP for her symptoms, and has had minimal relief. She states she has become progressively short of breath. She endorses feeling hot and cold, but has not taken her temp; 98.1 in clinic today.       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    Neuralgia, neuritis, and radiculitis, unspecified    Abdominal pain, unspecified site    Ureteral calculus    Dermatitis due to drugs and medicines taken internally(693.0)    Dermatochalasis    Personal history of allergy to sulfonamides    Chronic diastolic heart failure    Knee pain    Anemia    Chronic right ear pain    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    Hearing loss, sensorineural    Stage 3a chronic kidney disease    Coronary artery disease involving native coronary artery of native heart with angina pectoris    Lactose intolerance    Allergic rhinitis    Hyperlipidemia    Aortic atherosclerosis    Spinal enthesopathy of lumbar region    LLQ abdominal pain    Diverticulitis of large intestine without perforation or abscess without bleeding    Esophageal dysphagia    Fatty liver    Painful swallowing    Left facial numbness    Neck pain on left side    Type 2 diabetes mellitus, with long-term current use of insulin    Shortness of breath    Diabetic " neuropathy associated with type 2 diabetes mellitus    Type 2 diabetes mellitus with stage 3 chronic kidney disease, with long-term current use of insulin    Obesity (BMI 30.0-34.9)    Low hemoglobin and low hematocrit    History of colon polyps    Type 2 diabetes mellitus with diabetic polyneuropathy, with long-term current use of insulin    Obesity, diabetes, and hypertension syndrome    Spinal stenosis of lumbar region with neurogenic claudication    Benign paroxysmal positional vertigo due to bilateral vestibular disorder    Tortuous aorta    Ectatic aorta    Insulin long-term use    Facet arthropathy    Mood disorder    Type 2 diabetes mellitus with hyperglycemia    Hypovitaminosis D    Research study patient - TACT2 EDTA Chelation Study    History of MI (myocardial infarction)    Facial nerve palsy, secondary    Gout, arthritis    Vitreomacular adhesion, right    Epiretinal membrane, left    Chronic pain    Decreased ROM of lumbar spine    Weakness    Abnormality of gait and mobility    Decreased functional activity tolerance    Chest pain    Postmenopausal    Osteopenia    Thyroid nodule    Bilateral leg edema    Neuropathic pain    Hematoma of arm, left, initial encounter    Deep vein thrombosis (DVT) of popliteal vein of both lower extremities    PAD (peripheral artery disease)    Memory loss    Venous insufficiency of both lower extremities    Chronic pain disorder    Decreased activities of daily living (ADL)    Impaired functional mobility, balance, gait, and endurance    Left leg pain    Chronic bilateral low back pain with sciatica    At risk for falls    Hydronephrosis with urinary obstruction due to ureteral calculus    History of DVT (deep vein thrombosis)    Hypoglycemia associated with diabetes    Hypomagnesemia    Hypokalemia    Stage 3b chronic kidney disease        Current Outpatient Medications   Medication Sig Dispense Refill    ACCU-CHEK GUIDE TEST STRIPS Strp TEST BLOOD SUGAR THREE TIMES  DAILY 300 strip 3    ACCU-CHEK SOFT DEV LANCETS Kit       albuterol (PROVENTIL/VENTOLIN HFA) 90 mcg/actuation inhaler Inhale 2 puffs by mouth into the lungs every 4 (four) hours as needed for Wheezing. Rescue 25.5 g 3    amLODIPine (NORVASC) 5 MG tablet Take 1 tablet (5 mg total) by mouth once daily. 90 tablet 3    apixaban (ELIQUIS) 5 mg Tab Take 1 tablet (5 mg total) by mouth 2 (two) times daily. 60 tablet 3    atorvastatin (LIPITOR) 40 MG tablet Take 1 tablet (40 mg total) by mouth once daily. 90 tablet 3    carvediloL (COREG) 25 MG tablet Take 1 tablet (25 mg total) by mouth 2 (two) times daily. 180 tablet 1    cholecalciferol, vitamin D3, 125 mcg (5,000 unit) Tab Take 5,000 Units by mouth once daily.      DROPSAFE ALCOHOL PREP PADS PadM USE TOPICALLY TO TEST BLOOD SUGAR FOUR TIMES DAILY 400 each 3    dulaglutide (TRULICITY) 0.75 mg/0.5 mL pen injector Inject 0.75 mg into the skin every 7 days. 4 pen 2    DULoxetine (CYMBALTA) 60 MG capsule Take 1 capsule (60 mg total) by mouth 2 (two) times daily. 180 capsule 3    fluticasone propionate (FLONASE) 50 mcg/actuation nasal spray Instill 2 sprays in each nostril once daily. 16 g 1    furosemide (LASIX) 20 MG tablet Take 1 tablet (20 mg total) by mouth every morning. Take 1 tablet by mouth on Monday, Wednesday, and Friday 90 tablet 3    hydrOXYzine HCL (ATARAX) 25 MG tablet Take 1 tablet (25 mg total) by mouth 3 (three) times daily as needed for Itching. 90 tablet 3    insulin aspart U-100 (NOVOLOG) 100 unit/mL (3 mL) InPn pen Inject 10 units before meals, hold if sugar is less than 100. Max daily 45 units. 9 mL 3    insulin glargine U-100, Lantus, (LANTUS SOLOSTAR U-100 INSULIN) 100 unit/mL (3 mL) InPn pen Inject 20 Units into the skin once daily.      lancets (ACCU-CHEK SOFTCLIX LANCETS) Misc TEST BLOOD SUGAR FOUR TIMES DAILY 400 each 3    magnesium oxide (MAG-OX) 400 mg (241.3 mg magnesium) tablet Take 1 tablet (400 mg total) by mouth once daily. 100 tablet 4     "oxyCODONE-acetaminophen (PERCOCET)  mg per tablet Take one tablet by mouth 5 times daily 150 tablet 0    oxyCODONE-acetaminophen (PERCOCET)  mg per tablet Take 1 tablet by mouth 5 (five) times daily. 150 tablet 0    pantoprazole (PROTONIX) 40 MG tablet Take 1 tablet (40 mg total) by mouth once daily. 90 tablet 1    pen needle, diabetic (BD ULTRA-FINE OLU PEN NEEDLE) 32 gauge x 5/32" Ndle Uses 4 times daily, on multiple daily insulin injections 130 each 12    rOPINIRole (REQUIP) 0.5 MG tablet Take 1 tablet (0.5 mg total) by mouth every evening. 90 tablet 3    amoxicillin-clavulanate 875-125mg (AUGMENTIN) 875-125 mg per tablet Take 1 tablet by mouth every 12 (twelve) hours. for 7 days 14 tablet 0    azelastine (ASTELIN) 137 mcg (0.1 %) nasal spray 2 sprays (274 mcg total) by Nasal route 2 (two) times daily as needed for Rhinitis. 30 mL 0    azithromycin (Z-DARSHANA) 250 MG tablet Take 2 tablets by mouth on day 1; Take 1 tablet by mouth on days 2-5 6 tablet 0    benzonatate (TESSALON) 100 MG capsule Take 1 capsule (100 mg total) by mouth 3 (three) times daily as needed for Cough. (Patient not taking: Reported on 12/12/2024) 30 capsule 0    blood-glucose meter kit Use as instructed 1 each 0    loratadine (CLARITIN) 10 mg tablet Take 1 tablet (10 mg total) by mouth daily as needed for Allergies. 30 tablet 1    olopatadine (PATADAY) 0.2 % Drop Place 1 drop into both eyes once daily. (Patient not taking: Reported on 6/14/2024) 2.5 mL 0     No current facility-administered medications for this visit.        Review of Systems   Constitutional:  Positive for chills and fatigue. Negative for fever.   HENT:  Positive for nasal congestion and postnasal drip. Negative for sinus pressure/congestion and sore throat.    Respiratory:  Positive for cough and shortness of breath.    Cardiovascular:  Negative for chest pain.          Objective     Physical Exam  Constitutional:       General: She is not in acute distress.     " Appearance: Normal appearance. She is ill-appearing. She is not toxic-appearing or diaphoretic.   HENT:      Head: Normocephalic and atraumatic.      Nose: Congestion and rhinorrhea present.      Mouth/Throat:      Mouth: Mucous membranes are moist.      Pharynx: Oropharynx is clear. Posterior oropharyngeal erythema present.   Eyes:      Pupils: Pupils are equal, round, and reactive to light.   Cardiovascular:      Rate and Rhythm: Normal rate and regular rhythm.   Pulmonary:      Effort: Pulmonary effort is normal.      Breath sounds: Rales (bilateral lung bases) present.   Abdominal:      General: Abdomen is flat.   Skin:     General: Skin is warm and dry.   Neurological:      Mental Status: She is alert and oriented to person, place, and time.   Psychiatric:         Mood and Affect: Mood normal.         Behavior: Behavior normal.          Assessment and Plan     1. Atypical pneumonia; flu and covid negative in clinic. Rales appreciated on exam to bilateral lower lobes. CAP high on differential; will treat for typical and atypical respiratory pathogens since patient has T2DM. Instructed to  robitussin DM and take per package instructions, prn cough and congestion. Astelin also provided for nasal congestion. Strict ED precautions given.   -     POCT COVID-19 Rapid Screening  -     POCT Influenza A/B Molecular  -     azelastine (ASTELIN) 137 mcg (0.1 %) nasal spray; 2 sprays (274 mcg total) by Nasal route 2 (two) times daily as needed for Rhinitis.  Dispense: 30 mL; Refill: 0  -     azithromycin (Z-DARSHANA) 250 MG tablet; Take 2 tablets by mouth on day 1; Take 1 tablet by mouth on days 2-5  Dispense: 6 tablet; Refill: 0  -     amoxicillin-clavulanate 875-125mg (AUGMENTIN) 875-125 mg per tablet; Take 1 tablet by mouth every 12 (twelve) hours. for 7 days  Dispense: 14 tablet; Refill: 0  -     X-Ray Chest PA And Lateral; Future; Expected date: 12/12/2024    2. Type 2 diabetes mellitus without complication, with  long-term current use of insulin; suspect CAP. Will treat for typical and atypical respiratory pathogens since patient has T2DM.       3.  Respiratory infection  -     POCT COVID-19 Rapid Screening  -     POCT Influenza A/B Molecular  -     azelastine (ASTELIN) 137 mcg (0.1 %) nasal spray; 2 sprays (274 mcg total) by Nasal route 2 (two) times daily as needed for Rhinitis.  -     azithromycin (Z-DARSHANA) 250 MG tablet; Take 2 tablets by mouth on day 1; Take 1 tablet by mouth on days 2-5  -     amoxicillin-clavulanate 875-125mg (AUGMENTIN) 875-125 mg per tablet; Take 1 tablet by mouth every 12 (twelve) hours. for 7 days  -     X-Ray Chest PA And Lateral; Future              George Reeder NP   Internal Medicine           Follow up if symptoms worsen or fail to improve.

## 2024-12-26 DIAGNOSIS — G89.4 CHRONIC PAIN SYNDROME: ICD-10-CM

## 2024-12-26 RX ORDER — OXYCODONE AND ACETAMINOPHEN 10; 325 MG/1; MG/1
1 TABLET ORAL
Qty: 150 TABLET | Refills: 0 | Status: SHIPPED | OUTPATIENT
Start: 2024-12-26

## 2024-12-26 NOTE — TELEPHONE ENCOUNTER
No care due was identified.  [facility] Embedded Care Due Messages. Reference number: [id number].   12/26/2024 11:31:15 AM CST

## 2024-12-27 NOTE — TELEPHONE ENCOUNTER
Gentle range of motion stretches followed by 20 minutes of ice to the area several times a day.  Use your sling for comfort and elevation.  Ice and elevate is much as possible.  Take your meds as prescribed.  You may take over-the-counter acetaminophen 975 mg every 4 hours with your medications as needed for pain.  Follow-up with Dr. Pereira next week proving with rest, time, and medications and to need a referral over to orthopedics or further imaging.  Return to the emergency department immediately for any acutely developing redness, any warmth and swelling associated to the areas of pain, any inability to flex or extend the shoulder, or any new or worse concerns.   Pharmacy advised

## 2025-01-02 ENCOUNTER — TELEPHONE (OUTPATIENT)
Dept: INTERNAL MEDICINE | Facility: CLINIC | Age: 85
End: 2025-01-02
Payer: MEDICARE

## 2025-01-02 NOTE — TELEPHONE ENCOUNTER
----- Message from Jennifer sent at 1/2/2025 11:50 AM CST -----  Contact: Josee/[facility]/ [phone]  Diabetic  Supplies.  What supplies are needed: glucometer, lancets, test strips and lancet device  What is the brand name of the supplies:   Is this a refill or new prescription:  refill  Who prescribed the supplies:    Pharmacy or company name, phone # and location:  [facility] [place] - [address]   Phone: [phone]  Fax: [phone]        Would the patient like a call back, or a response through their [facility] portal?:   call  Comments:  Josee would like to know if Rx for diabetic supplies have been received? Please call and advise

## 2025-01-03 ENCOUNTER — TELEPHONE (OUTPATIENT)
Dept: INTERNAL MEDICINE | Facility: CLINIC | Age: 85
End: 2025-01-03
Payer: MEDICARE

## 2025-01-03 NOTE — TELEPHONE ENCOUNTER
----- Message from Ina sent at 1/3/2025  9:26 AM CST -----  .1MEDICALADVICE     Patient is calling for Medical Advice regarding: [facility] pharmacy is calling to verify if a fax was received with the prescription for the diabetes supplies. Please call and confirm [phone] - Rosanne     How long has patient had these symptoms:    Pharmacy name and phone#:    Patient wants a call back or thru [facility]:    Comments:    Please advise patient replies from provider may take up to 48 hours.

## 2025-01-06 ENCOUNTER — NURSE TRIAGE (OUTPATIENT)
Dept: ADMINISTRATIVE | Facility: CLINIC | Age: 85
End: 2025-01-06
Payer: MEDICARE

## 2025-01-06 ENCOUNTER — OFFICE VISIT (OUTPATIENT)
Dept: INTERNAL MEDICINE | Facility: CLINIC | Age: 85
End: 2025-01-06
Payer: MEDICARE

## 2025-01-06 VITALS
BODY MASS INDEX: 31.01 KG/M2 | HEART RATE: 89 BPM | SYSTOLIC BLOOD PRESSURE: 150 MMHG | OXYGEN SATURATION: 97 % | WEIGHT: 203.94 LBS | DIASTOLIC BLOOD PRESSURE: 90 MMHG

## 2025-01-06 DIAGNOSIS — E11.42 DIABETIC POLYNEUROPATHY ASSOCIATED WITH TYPE 2 DIABETES MELLITUS: ICD-10-CM

## 2025-01-06 DIAGNOSIS — R21 RASH: Primary | ICD-10-CM

## 2025-01-06 PROCEDURE — 3072F LOW RISK FOR RETINOPATHY: CPT | Mod: HCNC,CPTII,S$GLB, | Performed by: FAMILY MEDICINE

## 2025-01-06 PROCEDURE — 3288F FALL RISK ASSESSMENT DOCD: CPT | Mod: HCNC,CPTII,S$GLB, | Performed by: FAMILY MEDICINE

## 2025-01-06 PROCEDURE — 1101F PT FALLS ASSESS-DOCD LE1/YR: CPT | Mod: HCNC,CPTII,S$GLB, | Performed by: FAMILY MEDICINE

## 2025-01-06 PROCEDURE — 3077F SYST BP >= 140 MM HG: CPT | Mod: HCNC,CPTII,S$GLB, | Performed by: FAMILY MEDICINE

## 2025-01-06 PROCEDURE — 99213 OFFICE O/P EST LOW 20 MIN: CPT | Mod: HCNC,S$GLB,, | Performed by: FAMILY MEDICINE

## 2025-01-06 PROCEDURE — 1160F RVW MEDS BY RX/DR IN RCRD: CPT | Mod: HCNC,CPTII,S$GLB, | Performed by: FAMILY MEDICINE

## 2025-01-06 PROCEDURE — 1125F AMNT PAIN NOTED PAIN PRSNT: CPT | Mod: HCNC,CPTII,S$GLB, | Performed by: FAMILY MEDICINE

## 2025-01-06 PROCEDURE — 3080F DIAST BP >= 90 MM HG: CPT | Mod: HCNC,CPTII,S$GLB, | Performed by: FAMILY MEDICINE

## 2025-01-06 PROCEDURE — 1159F MED LIST DOCD IN RCRD: CPT | Mod: HCNC,CPTII,S$GLB, | Performed by: FAMILY MEDICINE

## 2025-01-06 PROCEDURE — 99999 PR PBB SHADOW E&M-EST. PATIENT-LVL IV: CPT | Mod: PBBFAC,HCNC,, | Performed by: FAMILY MEDICINE

## 2025-01-06 RX ORDER — INSULIN ASPART 100 [IU]/ML
INJECTION, SOLUTION INTRAVENOUS; SUBCUTANEOUS
Qty: 9 ML | Refills: 3 | Status: CANCELLED | OUTPATIENT
Start: 2025-01-06

## 2025-01-06 RX ORDER — TRIAMCINOLONE ACETONIDE 1 MG/G
OINTMENT TOPICAL 2 TIMES DAILY
Qty: 30 G | Refills: 1 | Status: SHIPPED | OUTPATIENT
Start: 2025-01-06

## 2025-01-06 RX ORDER — HYDROXYZINE HYDROCHLORIDE 25 MG/1
25 TABLET, FILM COATED ORAL 3 TIMES DAILY PRN
Qty: 30 TABLET | Refills: 0 | Status: SHIPPED | OUTPATIENT
Start: 2025-01-06

## 2025-01-06 NOTE — TELEPHONE ENCOUNTER
Spoke with pt who reports having rash across back for last month. Reports rash noted to be very itchy, and painful. States some parts of rash are noted to be black in color. Denies fever. Advised to be seen today in office. Appointment scheduled.     Reason for Disposition   Localized rash is very painful (no fever)    Additional Information   Negative: Sounds like a life-threatening emergency to the triager   Negative: Fever and localized purple or blood-colored spots or dots that are not from injury or friction   Negative: Fever and localized rash is very painful   Negative: Patient sounds very sick or weak to the triager   Negative: Looks like a boil, infected sore, deep ulcer, or other infected rash (spreading redness, pus)   Negative: Painful rash with multiple small blisters grouped together (i.e., dermatomal distribution or 'band' or 'stripe')    Protocols used: Rash or Redness - [id number]

## 2025-01-06 NOTE — PROGRESS NOTES
Subjective:      Patient ID: Henrietta Villasenor is a 84 y.o. female.    Chief Complaint: Rash (Back Top to Middle)    History of Present Illness    CHIEF COMPLAINT:  - Ms. Villasenor presents with an itchy, bumpy rash on her back that is causing discomfort.    HPI:  Ms. Villasenor reports a bumpy rash on her back associated with pruritus and pain, first noticed approximately 1 week ago after bathing. The rash has a dark, speckled appearance and extends from the upper back to the lower back, with some larger, wider lesions. The pruritus is constant and severe. Ms. Villasenor has attempted to alleviate symptoms by applying an ointment and changing her clothing, but these interventions have not provided relief. She recently showered and was able to visualize the rash in the mirror afterwards.    She also notes some itching behind her knees and darkened skin there. No bumpy rash.    Ms. Villasenor denies a history of eczema or similar pruritic skin conditions.    MEDICATIONS:  - Hydroxyzine    MEDICAL HISTORY:  - Xerosis cutis          Review of systems negative except for those noted in above HPI.    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    Neuralgia, neuritis, and radiculitis, unspecified    Abdominal pain, unspecified site    Ureteral calculus    Dermatitis due to drugs and medicines taken internally(693.0)    Dermatochalasis    Personal history of allergy to sulfonamides    Chronic diastolic heart failure    Knee pain    Anemia    Chronic right ear pain    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    Hearing loss, sensorineural    Stage 3a chronic kidney disease    Coronary artery disease involving native coronary artery of native heart with angina pectoris    Lactose intolerance     Allergic rhinitis    Hyperlipidemia    Aortic atherosclerosis    Spinal enthesopathy of lumbar region    LLQ abdominal pain    Diverticulitis of large intestine without perforation or abscess without bleeding    Esophageal dysphagia    Fatty liver    Painful swallowing    Left facial numbness    Neck pain on left side    Type 2 diabetes mellitus, with long-term current use of insulin    Shortness of breath    Diabetic neuropathy associated with type 2 diabetes mellitus    Type 2 diabetes mellitus with stage 3 chronic kidney disease, with long-term current use of insulin    Obesity (BMI 30.0-34.9)    Low hemoglobin and low hematocrit    History of colon polyps    Type 2 diabetes mellitus with diabetic polyneuropathy, with long-term current use of insulin    Obesity, diabetes, and hypertension syndrome    Spinal stenosis of lumbar region with neurogenic claudication    Benign paroxysmal positional vertigo due to bilateral vestibular disorder    Tortuous aorta    Ectatic aorta    Insulin long-term use    Facet arthropathy    Mood disorder    Type 2 diabetes mellitus with hyperglycemia    Hypovitaminosis D    Research study patient - TACT2 EDTA Chelation Study    History of MI (myocardial infarction)    Facial nerve palsy, secondary    Gout, arthritis    Vitreomacular adhesion, right    Epiretinal membrane, left    Chronic pain    Decreased ROM of lumbar spine    Weakness    Abnormality of gait and mobility    Decreased functional activity tolerance    Chest pain    Postmenopausal    Osteopenia    Thyroid nodule    Bilateral leg edema    Neuropathic pain    Hematoma of arm, left, initial encounter    Deep vein thrombosis (DVT) of popliteal vein of both lower extremities    PAD (peripheral artery disease)    Memory loss    Venous insufficiency of both lower extremities    Chronic pain disorder    Decreased activities of daily living (ADL)    Impaired functional mobility, balance, gait, and endurance    Left leg pain     Chronic bilateral low back pain with sciatica    At risk for falls    Hydronephrosis with urinary obstruction due to ureteral calculus    History of DVT (deep vein thrombosis)    Hypoglycemia associated with diabetes    Hypomagnesemia    Hypokalemia    Stage 3b chronic kidney disease          Current Outpatient Medications:     ACCU-CHEK GUIDE TEST STRIPS Strp, TEST BLOOD SUGAR THREE TIMES DAILY, Disp: 300 strip, Rfl: 3    ACCU-CHEK SOFT DEV LANCETS Kit, , Disp: , Rfl:     albuterol (PROVENTIL/VENTOLIN HFA) 90 mcg/actuation inhaler, Inhale 2 puffs by mouth into the lungs every 4 (four) hours as needed for Wheezing. Rescue, Disp: 25.5 g, Rfl: 3    amLODIPine (NORVASC) 5 MG tablet, Take 1 tablet (5 mg total) by mouth once daily., Disp: 90 tablet, Rfl: 3    apixaban (ELIQUIS) 5 mg Tab, Take 1 tablet (5 mg total) by mouth 2 (two) times daily., Disp: 60 tablet, Rfl: 3    atorvastatin (LIPITOR) 40 MG tablet, Take 1 tablet (40 mg total) by mouth once daily., Disp: 90 tablet, Rfl: 3    azelastine (ASTELIN) 137 mcg (0.1 %) nasal spray, 2 sprays (274 mcg total) by Nasal route 2 (two) times daily as needed for Rhinitis., Disp: 30 mL, Rfl: 0    benzonatate (TESSALON) 100 MG capsule, Take 1 capsule (100 mg total) by mouth 3 (three) times daily as needed for Cough., Disp: 30 capsule, Rfl: 0    carvediloL (COREG) 25 MG tablet, Take 1 tablet (25 mg total) by mouth 2 (two) times daily., Disp: 180 tablet, Rfl: 1    cholecalciferol, vitamin D3, 125 mcg (5,000 unit) Tab, Take 5,000 Units by mouth once daily., Disp: , Rfl:     DROPSAFE ALCOHOL PREP PADS PadM, USE TOPICALLY TO TEST BLOOD SUGAR FOUR TIMES DAILY, Disp: 400 each, Rfl: 3    dulaglutide (TRULICITY) 0.75 mg/0.5 mL pen injector, Inject 0.75 mg into the skin every 7 days., Disp: 4 pen , Rfl: 2    DULoxetine (CYMBALTA) 60 MG capsule, Take 1 capsule (60 mg total) by mouth 2 (two) times daily., Disp: 180 capsule, Rfl: 3    fluticasone propionate (FLONASE) 50 mcg/actuation nasal  "spray, Instill 2 sprays in each nostril once daily., Disp: 16 g, Rfl: 1    furosemide (LASIX) 20 MG tablet, Take 1 tablet (20 mg total) by mouth every morning. Take 1 tablet by mouth on Monday, Wednesday, and Friday, Disp: 90 tablet, Rfl: 3    insulin aspart U-100 (NOVOLOG) 100 unit/mL (3 mL) InPn pen, Inject 10 units before meals, hold if sugar is less than 100. Max daily 45 units., Disp: 9 mL, Rfl: 3    insulin glargine U-100, Lantus, (LANTUS SOLOSTAR U-100 INSULIN) 100 unit/mL (3 mL) InPn pen, Inject 20 Units into the skin once daily., Disp: , Rfl:     lancets (ACCU-CHEK SOFTCLIX LANCETS) Misc, TEST BLOOD SUGAR FOUR TIMES DAILY, Disp: 400 each, Rfl: 3    magnesium oxide (MAG-OX) 400 mg (241.3 mg magnesium) tablet, Take 1 tablet (400 mg total) by mouth once daily., Disp: 100 tablet, Rfl: 4    oxyCODONE-acetaminophen (PERCOCET)  mg per tablet, Take one tablet by mouth 5 times daily, Disp: 150 tablet, Rfl: 0    oxyCODONE-acetaminophen (PERCOCET)  mg per tablet, Take 1 tablet by mouth 5 (five) times daily., Disp: 150 tablet, Rfl: 0    pantoprazole (PROTONIX) 40 MG tablet, Take 1 tablet (40 mg total) by mouth once daily., Disp: 90 tablet, Rfl: 1    pen needle, diabetic (BD ULTRA-FINE OLU PEN NEEDLE) 32 gauge x 5/32" Ndle, Uses 4 times daily, on multiple daily insulin injections, Disp: 130 each, Rfl: 12    rOPINIRole (REQUIP) 0.5 MG tablet, Take 1 tablet (0.5 mg total) by mouth every evening., Disp: 90 tablet, Rfl: 3    blood-glucose meter kit, Use as instructed, Disp: 1 each, Rfl: 0    hydrOXYzine HCL (ATARAX) 25 MG tablet, Take 1 tablet (25 mg total) by mouth 3 (three) times daily as needed for Itching., Disp: 30 tablet, Rfl: 0    loratadine (CLARITIN) 10 mg tablet, Take 1 tablet (10 mg total) by mouth daily as needed for Allergies., Disp: 30 tablet, Rfl: 1    olopatadine (PATADAY) 0.2 % Drop, Place 1 drop into both eyes once daily. (Patient not taking: Reported on 6/14/2024), Disp: 2.5 mL, Rfl: 0    " triamcinolone acetonide 0.1% (KENALOG) 0.1 % ointment, Apply topically 2 (two) times daily. Apply to rash on back, Disp: 30 g, Rfl: 1    Lab Results   Component Value Date     11/05/2024    K 3.7 11/05/2024     11/05/2024    CO2 29 11/05/2024     (H) 11/05/2024    BUN 15 11/05/2024    CREATININE 1.3 11/05/2024    CALCIUM 10.4 11/05/2024    PROT 8.4 11/05/2024    ALBUMIN 3.6 11/05/2024    BILITOT 0.5 11/05/2024    ALKPHOS 107 11/05/2024    AST 19 11/05/2024    ALT 13 11/05/2024    ANIONGAP 10 11/05/2024    ESTGFRAFRICA 48.6 (A) 07/07/2022    EGFRNONAA 42.2 (A) 07/07/2022    WBC 11.10 11/05/2024    HGB 12.8 11/05/2024    HGB 11.3 (L) 09/26/2024    HCT 42.2 11/05/2024    MCV 89 11/05/2024     11/05/2024    TSH 1.084 08/16/2024    HEPCAB Non-reactive 04/23/2024       Lab Results   Component Value Date    HGBA1C 8.4 (H) 11/05/2024    HGBA1C 7.0 (H) 08/16/2024    HGBA1C 7.5 (H) 05/09/2024     Lab Results   Component Value Date    MICALBCREAT 5.9 05/19/2021     Lab Results   Component Value Date    LDLCALC 134.4 09/26/2024    LDLCALC 98.6 09/08/2023    CHOL 197 09/26/2024    HDL 41 09/26/2024    TRIG 108 09/26/2024       Reviewed PMH, PSH, SH, FH and any changes have been updated.    Vitals:    01/06/25 1524   BP: (!) 150/90   Pulse: 89   SpO2: 97%   Weight: 92.5 kg (203 lb 14.8 oz)   PainSc:   9   PainLoc: Back     Objective:   Physical Exam    General: Pleasant. No acute distress. Well-developed. Well-nourished.  Eyes: EOMBI. Sclerae anicteric.  HENT: Normocephalic. Atraumatic. Nares patent. Moist oral mucosa.  Cardiovascular: Regular rate and rhythm. No murmurs. No gallops. No rubs. Normal S1 and S2.  Respiratory: Normal respiratory effort. Clear to auscultation bilaterally. No rales. No rhonchi. No wheezing.  Abdomen: Soft. Nontender. Nondistended. Normoactive bowel sounds.  Musculoskeletal: No obvious deformity. Normal range of motion.  Extremities: No lower extremity edema.  Neurological:  Alert and lucid. Normal gait. No gross deficits.  Psychiatric: Normal mood. Normal affect. Good insight. Good judgment.  Skin: Warm. Intact. Flesh-colored papular rash located to bilateral upper/mid back. Non-tender. No erythema, no induration, no fluctuance.         Assessment:         ICD-10-CM ICD-9-CM   1. Rash  R21 782.1        Plan:   Assessment & Plan     Assessed patient's itchy, bumpy rash on back   Determined treatment plan: topical steroid ointment and oral antihistamine    RASH:  - Evaluated the patient's skin condition, noting raised spots assumed to be the itchy welts reported by the patient.  - Unclear cause of dermatitis. Non-vesicular, no pain, rash does not resemble shingles. No new medications or environmental contact.  - Noted hyperpigmented area on back of knees, suspect post-inflammatory hyperpigmentation.  - Triamcinolone Rx'd for rash on back. Hydroxyzine prn for itch also; discussed potential adverse s/e.  - Patient's granddaughter will help her apply the triamcinolone.  - Follow up with PCP if refractory to treatment.    Rash    Other orders  -     triamcinolone acetonide 0.1% (KENALOG) 0.1 % ointment; Apply topically 2 (two) times daily. Apply to rash on back  Dispense: 30 g; Refill: 1  -     hydrOXYzine HCL (ATARAX) 25 MG tablet; Take 1 tablet (25 mg total) by mouth 3 (three) times daily as needed for Itching.  Dispense: 30 tablet; Refill: 0        Chronic conditions status updated as per HPI.  Other than changes above, cont current medications and maintain follow up with specialists.      No follow-ups on file.         Coco Staples MD  [facility]    This note was generated with the assistance of ambient listening technology. Verbal consent was obtained by the patient and accompanying visitor(s) for the recording of patient appointment to facilitate this note. I attest to having reviewed and edited the generated note for accuracy, though some syntax or spelling errors  may persist. Please contact the author of this note for any clarification.       There are no Patient Instructions on file for this visit.  All of your core healthy metrics are met.

## 2025-01-07 ENCOUNTER — TELEPHONE (OUTPATIENT)
Dept: INTERNAL MEDICINE | Facility: CLINIC | Age: 85
End: 2025-01-07
Payer: MEDICARE

## 2025-01-07 ENCOUNTER — TELEPHONE (OUTPATIENT)
Dept: INTERNAL MEDICINE | Facility: CLINIC | Age: 85
End: 2025-01-07

## 2025-01-07 RX ORDER — INSULIN ASPART 100 [IU]/ML
INJECTION, SOLUTION INTRAVENOUS; SUBCUTANEOUS
Qty: 9 ML | Refills: 3 | Status: SHIPPED | OUTPATIENT
Start: 2025-01-07

## 2025-01-07 NOTE — TELEPHONE ENCOUNTER
Called and spoke to LINDA   Told her that pt knows nothing about this and that we are not sending them anything

## 2025-01-07 NOTE — TELEPHONE ENCOUNTER
Refill Routing Note   Medication(s) are not appropriate for processing by [facility] for the following reason(s):        Non-participating provider    [facility] action(s):  Route               Appointments  past 12m or future 3m with PCP    Date Provider   Last Visit   12/3/2024 Margaret Cabrera PA-C   Next Visit   2/4/2025 Margaret Cabrera PA-C   ED visits in past 90 days: 0        Note composed:9:57 PM 01/06/2025

## 2025-01-07 NOTE — TELEPHONE ENCOUNTER
----- Message from Leidy sent at 1/7/2025  3:14 PM CST -----  Contact: Ms. Dukes Phone# [phone]  Ms. Dukes a Representative from [facility], would like to know if you have received an order for Diabetic Supplies that was faxed over to you on December twenty fourth?

## 2025-01-07 NOTE — TELEPHONE ENCOUNTER
----- Message from Cristina sent at 1/7/2025 12:36 PM CST -----  Contact: [phone]  .[email or web address]     Patient is calling for Medical Advice regarding: Ms Gomez with  [facility] is calling to verify status for a prescription for the Diabetic supplies       PPatient wants a call back or thru [facility]: call back     Comments:  Phone number.   [phone]    Please advise patient replies from provider may take up to 48 hours.

## 2025-01-08 NOTE — TELEPHONE ENCOUNTER
----- Message from Jennifer sent at 2025  9:34 AM CST -----  Contact: Roseline from [facility]  @ [phone]  .[email or web address]     Patient is calling for Medical Advice regardin [place] CALL ABOUT FAX ON DIABETIC SUPPLIES    How long has patient had these symptoms:    Pharmacy name and phone#:    Patient wants a call back or thru [facility]:    Comments: Please call Roseline from [facility]  @ [phone]    Please advise patient replies from provider may take up to 48 hours.

## 2025-01-08 NOTE — TELEPHONE ENCOUNTER
I have spoke to Ms Henrietta earlier in the week   She does not know anything about this pharmacy  I called and spoke to Roseline yesterday as well and told her that I did speak with the pt and she was not aware of this either.   I have it documented in the system

## 2025-01-08 NOTE — TELEPHONE ENCOUNTER
I have never gotten a fax from this pharmacy.  Contact patient to see if she requested this. It could be a scam

## 2025-01-13 DIAGNOSIS — Z00.00 ENCOUNTER FOR MEDICARE ANNUAL WELLNESS EXAM: ICD-10-CM

## 2025-01-16 ENCOUNTER — TELEPHONE (OUTPATIENT)
Dept: INTERNAL MEDICINE | Facility: CLINIC | Age: 85
End: 2025-01-16
Payer: MEDICARE

## 2025-01-16 NOTE — TELEPHONE ENCOUNTER
----- Message from Regino sent at 1/16/2025 11:59 AM CST -----  Contact: [phone]  .[email or web address]     Patient is calling for Medical Advice regarding: pt has no medicine and does not know what to do, please call    Patient wants a call back or thru [facility]: call    Comments:    Please advise patient replies from provider may take up to 48 hours.

## 2025-01-19 ENCOUNTER — HOSPITAL ENCOUNTER (EMERGENCY)
Facility: HOSPITAL | Age: 85
Discharge: HOME | End: 2025-01-20
Attending: EMERGENCY MEDICINE
Payer: MEDICARE
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
## 2025-01-19 DIAGNOSIS — S00.83XA HEMATOMA OF FACE, INITIAL ENCOUNTER: ICD-10-CM
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
## 2025-01-19 DIAGNOSIS — S50.02XA CONTUSION OF LEFT ELBOW, INITIAL ENCOUNTER: Primary | ICD-10-CM

## 2025-01-19 LAB
ALBUMIN SERPL BCP-MCNC: 3.5 G/DL (ref 3.5–5.2)
ALP SERPL-CCNC: 93 U/L (ref 40–150)
ALT SERPL W/O P-5'-P-CCNC: 14 U/L (ref 10–44)
ANION GAP SERPL CALC-SCNC: 15 MMOL/L (ref 8–16)
AST SERPL-CCNC: 22 U/L (ref 10–40)
BASOPHILS # BLD AUTO: 0.06 K/UL (ref 0–0.2)
BASOPHILS NFR BLD: 0.5 % (ref 0–1.9)
BILIRUB SERPL-MCNC: 0.4 MG/DL (ref 0.1–1)
BUN SERPL-MCNC: 16 MG/DL (ref 8–23)
CALCIUM SERPL-MCNC: 10.4 MG/DL (ref 8.7–10.5)
CHLORIDE SERPL-SCNC: 103 MMOL/L (ref 95–110)
CO2 SERPL-SCNC: 24 MMOL/L (ref 23–29)
CREAT SERPL-MCNC: 1.2 MG/DL (ref 0.5–1.4)
DIFFERENTIAL METHOD BLD: ABNORMAL
EOSINOPHIL # BLD AUTO: 0.4 K/UL (ref 0–0.5)
EOSINOPHIL NFR BLD: 3.5 % (ref 0–8)
ERYTHROCYTE [DISTWIDTH] IN BLOOD BY AUTOMATED COUNT: 13.7 % (ref 11.5–14.5)
EST. GFR  (NO RACE VARIABLE): 44.6 ML/MIN/1.73 M^2
GLUCOSE SERPL-MCNC: 154 MG/DL (ref 70–110)
HCT VFR BLD AUTO: 40.2 % (ref 37–48.5)
HGB BLD-MCNC: 12.7 G/DL (ref 12–16)
IMM GRANULOCYTES # BLD AUTO: 0.04 K/UL (ref 0–0.04)
IMM GRANULOCYTES NFR BLD AUTO: 0.4 % (ref 0–0.5)
INR PPP: 1.1 (ref 0.8–1.2)
LYMPHOCYTES # BLD AUTO: 2.7 K/UL (ref 1–4.8)
LYMPHOCYTES NFR BLD: 23.8 % (ref 18–48)
MCH RBC QN AUTO: 27.1 PG (ref 27–31)
MCHC RBC AUTO-ENTMCNC: 31.6 G/DL (ref 32–36)
MCV RBC AUTO: 86 FL (ref 82–98)
MONOCYTES # BLD AUTO: 1 K/UL (ref 0.3–1)
MONOCYTES NFR BLD: 9.2 % (ref 4–15)
NEUTROPHILS # BLD AUTO: 7 K/UL (ref 1.8–7.7)
NEUTROPHILS NFR BLD: 62.6 % (ref 38–73)
NRBC BLD-RTO: 0 /100 WBC
PLATELET # BLD AUTO: 217 K/UL (ref 150–450)
PMV BLD AUTO: 11.3 FL (ref 9.2–12.9)
POTASSIUM SERPL-SCNC: 4.2 MMOL/L (ref 3.5–5.1)
PROT SERPL-MCNC: 8.2 G/DL (ref 6–8.4)
PROTHROMBIN TIME: 12 SEC (ref 9–12.5)
RBC # BLD AUTO: 4.68 M/UL (ref 4–5.4)
SODIUM SERPL-SCNC: 142 MMOL/L (ref 136–145)
WBC # BLD AUTO: 11.23 K/UL (ref 3.9–12.7)

## 2025-01-19 PROCEDURE — 96374 THER/PROPH/DIAG INJ IV PUSH: CPT | Mod: HCNC

## 2025-01-19 PROCEDURE — 99285 EMERGENCY DEPT VISIT HI MDM: CPT | Mod: 25,HCNC

## 2025-01-19 PROCEDURE — 93005 ELECTROCARDIOGRAM TRACING: CPT | Mod: HCNC

## 2025-01-19 PROCEDURE — 80053 COMPREHEN METABOLIC PANEL: CPT | Mod: HCNC

## 2025-01-19 PROCEDURE — 85025 COMPLETE CBC W/AUTO DIFF WBC: CPT | Mod: HCNC

## 2025-01-19 PROCEDURE — 85610 PROTHROMBIN TIME: CPT | Mod: HCNC

## 2025-01-19 PROCEDURE — 25000003 PHARM REV CODE 250: Mod: HCNC | Performed by: EMERGENCY MEDICINE

## 2025-01-19 PROCEDURE — 93010 ELECTROCARDIOGRAM REPORT: CPT | Mod: HCNC,,, | Performed by: INTERNAL MEDICINE

## 2025-01-19 PROCEDURE — 63600175 PHARM REV CODE 636 W HCPCS: Mod: HCNC

## 2025-01-19 RX ORDER — MORPHINE SULFATE 4 MG/ML
4 INJECTION, SOLUTION INTRAMUSCULAR; INTRAVENOUS
Status: COMPLETED | OUTPATIENT
Start: 2025-01-19 | End: 2025-01-19

## 2025-01-19 RX ORDER — ACETAMINOPHEN 500 MG
1000 TABLET ORAL
Status: COMPLETED | OUTPATIENT
Start: 2025-01-19 | End: 2025-01-19

## 2025-01-19 RX ADMIN — ACETAMINOPHEN 1000 MG: 500 TABLET ORAL at 10:01

## 2025-01-19 RX ADMIN — MORPHINE SULFATE 4 MG: 4 INJECTION INTRAVENOUS at 10:01

## 2025-01-20 VITALS
DIASTOLIC BLOOD PRESSURE: 117 MMHG | OXYGEN SATURATION: 96 % | WEIGHT: 205 LBS | RESPIRATION RATE: 20 BRPM | TEMPERATURE: 98 F | SYSTOLIC BLOOD PRESSURE: 186 MMHG | BODY MASS INDEX: 31.07 KG/M2 | HEART RATE: 73 BPM | HEIGHT: 68 IN

## 2025-01-20 LAB
OHS QRS DURATION: 122 MS
OHS QTC CALCULATION: 482 MS

## 2025-01-20 PROCEDURE — 25000003 PHARM REV CODE 250: Mod: HCNC

## 2025-01-20 PROCEDURE — 25000003 PHARM REV CODE 250: Mod: HCNC | Performed by: STUDENT IN AN ORGANIZED HEALTH CARE EDUCATION/TRAINING PROGRAM

## 2025-01-20 RX ORDER — PROPARACAINE HYDROCHLORIDE 5 MG/ML
1 SOLUTION/ DROPS OPHTHALMIC
Status: COMPLETED | OUTPATIENT
Start: 2025-01-20 | End: 2025-01-20

## 2025-01-20 RX ORDER — AMLODIPINE BESYLATE 5 MG/1
5 TABLET ORAL
Status: COMPLETED | OUTPATIENT
Start: 2025-01-20 | End: 2025-01-20

## 2025-01-20 RX ADMIN — AMLODIPINE BESYLATE 5 MG: 5 TABLET ORAL at 01:01

## 2025-01-20 RX ADMIN — PROPARACAINE HYDROCHLORIDE 1 DROP: 5 SOLUTION/ DROPS OPHTHALMIC at 01:01

## 2025-01-20 NOTE — ED TRIAGE NOTES
Henrietta Villasenor, a 84 y.o. female presents to the ED w/ complaint of trip and fall approximately 7 pm. Pt was getting out of car and walking on sidewalk when she tripped. Pt hit left forehead and eye, hematoma present. Pt takes eliquis, denies loc. Pt is AAOx4, GCS 15. Pt endorses 10/10 pain to left eye. Pt also reports bilateral shoulder pain. Pt denies SOB and chest pain.     Triage note:  Chief Complaint   Patient presents with    Fall     Pt had trip and fall. +hit head. Swelling and hematoma to left eye and forehead. +blood thinners upon chart review.      Review of patient's allergies indicates:   Allergen Reactions    Crestor [rosuvastatin]      Cramping in legs    Ezetimibe Diarrhea     Other reaction(s): abdominal pain, Diarrhea      Hydrocodone Itching    Lisinopril      Other reaction(s): cough      Sulfa (sulfonamide antibiotics) Itching and Rash           Sulfamethoxazole-trimethoprim     Valsartan Swelling     Past Medical History:   Diagnosis Date    Abnormality of lung 11/08/2011    Stable bandlike opacities at the lung bases, most likely representing      Anxiety     Arthritis     CAD (coronary artery disease)     Calculus of ureter 2/22/2011    CHF (congestive heart failure)     Chronic back pain 7/29/2012    Colon polyp 9/2010; 11/2010    Colon polyps 7/29/2012    Coronary artery disease involving native coronary artery of native heart with angina pectoris     Depression     Diabetes mellitus     Diabetes mellitus type II     Diverticulitis large intestine 7/27/2015    Diverticulitis of large intestine without perforation or abscess without bleeding     Diverticulosis 09/25/2010; 11/02/2010; 11/08/2011; 7/29/2012    Duodenal disorder 08/25/2011    Duodenal erosion noted on EGD.    Duodenal ulcer, unspecified as acute or chronic, without hemorrhage, perforation, or obstruction 8/24/2011    E. coli sepsis 12/2010    Due to left ureteral stone with left nephrostomy tube - hospitalized in [place]     Esophageal dysmotility 01/24/2012    Noted on upper GI-barium swallow.    Facial weakness 1969    Left facial weakness s/p left mastoidectomy in ~ 1969.    Fatty liver 11/08/2011    Reported on CT-abdomen and in 06/2012 Gastro clinic visit note.    Gastric polyp 09/29/2010    GERD (gastroesophageal reflux disease) 7/29/2012    Hepatomegaly 11/08/2011    Reported on CT-abdomen    Herpes zoster with other nervous system complications(053.19) 2/28/2011    Hiatal hernia 06/26/2006; 09/29/2010; 08/25/2011    Noted on barium swallow 2006; noted on  EGD 2011.    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    Trouble in sleeping     Type II or unspecified type diabetes mellitus with neurological manifestations, not stated as uncontrolled(250.60)     Type II or unspecified type diabetes mellitus with peripheral circulatory disorders, not stated as uncontrolled(250.70)     Type II or unspecified type diabetes mellitus with renal manifestations, not stated as uncontrolled(250.40)

## 2025-01-20 NOTE — ED PROVIDER NOTES
Encounter Date: 1/19/2025       History     Chief Complaint   Patient presents with    Fall     Pt had trip and fall. +hit head. Swelling and hematoma to left eye and forehead. +blood thinners upon chart review.      84-year-old female with multiple comorbidities including HTN, HLD, CHF, CAD, DM2, DVT on Eliquis presents to the ED regarding facial swelling and head injury as a result of GLF of mechanical nature without LOC. Patient was getting out of car and walking on sidewalk when she tripped. Pt hit left forehead and eye, hematoma present. AAOx4, GCS 15. Pt endorses 10/10 pain to left eye without vision loss but slight blurring due to surrounding swelling. Pt also reports bilateral shoulder pain. Pt denies SOB and chest pain.   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      The history is provided by the patient, medical records and a relative. No  was used.     Review of patient's allergies indicates:   Allergen Reactions    Crestor [rosuvastatin]      Cramping in legs    Ezetimibe Diarrhea     Other reaction(s): abdominal pain, Diarrhea      Hydrocodone Itching    Lisinopril      Other reaction(s): cough      Sulfa (sulfonamide antibiotics) Itching and Rash           Sulfamethoxazole-trimethoprim     Valsartan Swelling     Past Medical History:   Diagnosis Date    Abnormality of lung 11/08/2011    Stable bandlike opacities at the lung bases, most likely representing      Anxiety     Arthritis     CAD (coronary artery disease)     Calculus of ureter 2/22/2011    CHF (congestive heart failure)     Chronic back pain 7/29/2012    Colon polyp 9/2010; 11/2010    Colon polyps 7/29/2012    Coronary artery disease involving native coronary artery of native heart with angina pectoris     Depression     Diabetes mellitus     Diabetes mellitus type II     Diverticulitis large intestine 7/27/2015    Diverticulitis of large intestine without  perforation or abscess without bleeding     Diverticulosis 09/25/2010; 11/02/2010; 11/08/2011; 7/29/2012    Duodenal disorder 08/25/2011    Duodenal erosion noted on EGD.    Duodenal ulcer, unspecified as acute or chronic, without hemorrhage, perforation, or obstruction 8/24/2011    E. coli sepsis 12/2010    Due to left ureteral stone with left nephrostomy tube - hospitalized in [place]    Esophageal dysmotility 01/24/2012    Noted on upper GI-barium swallow.    Facial weakness 1969    Left facial weakness s/p left mastoidectomy in ~ 1969.    Fatty liver 11/08/2011    Reported on CT-abdomen and in 06/2012 Gastro clinic visit note.    Gastric polyp 09/29/2010    GERD (gastroesophageal reflux disease) 7/29/2012    Hepatomegaly 11/08/2011    Reported on CT-abdomen    Herpes zoster with other nervous system complications(053.19) 2/28/2011    Hiatal hernia 06/26/2006; 09/29/2010; 08/25/2011    Noted on barium swallow 2006; noted on  EGD 2011.    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    Trouble in sleeping     Type II or unspecified type diabetes mellitus with neurological manifestations, not stated as uncontrolled(250.60)     Type II or unspecified type diabetes mellitus with peripheral  circulatory disorders, not stated as uncontrolled(250.70)     Type II or unspecified type diabetes mellitus with renal manifestations, not stated as uncontrolled(250.40)      Past Surgical History:   Procedure Laterality Date    BELPHAROPTOSIS REPAIR      s/p LAMBERTO levator repair - Dr. Dejesus    CARDIAC CATHETERIZATION      CARPAL TUNNEL RELEASE  3/13/2012    CATARACT EXTRACTION W/  INTRAOCULAR LENS IMPLANT Right 10/31/2018        CATARACT EXTRACTION W/  INTRAOCULAR LENS IMPLANT Left 03/22/2018        CHOLECYSTECTOMY      COLONOSCOPY N/A 11/16/2016    Procedure: COLONOSCOPY;  Surgeon: Chris Storey MD;  Location: [facility] (4TH FLR);  Service: Endoscopy;  Laterality: N/A;    COLONOSCOPY N/A 6/14/2017    Procedure: COLONOSCOPY;  Surgeon: Chris Storey MD;  Location: [facility] (4TH FLR);  Service: Endoscopy;  Laterality: N/A;  colonoscopy in 3 months with better bowel prep. - Split PEG prep ordered    CYSTOSCOPY  4/19/2023    Procedure: CYSTOSCOPY;  Surgeon: Domo Bates MD;  [facility];  Service: Urology;;    CYSTOSCOPY N/A 5/12/2023    Procedure: CYSTOSCOPY;  Surgeon: Laz uRdolph Jr., MD;  [facility];  Service: Urology;  Laterality: N/A;    CYSTOSCOPY N/A 7/7/2023    Procedure: CYSTOSCOPY;  Surgeon: Laz Rudolph Jr., MD;  [facility];  Service: Urology;  Laterality: N/A;    ESOPHAGOGASTRODUODENOSCOPY N/A 11/10/2021    Procedure: EGD (ESOPHAGOGASTRODUODENOSCOPY);  Surgeon: Kuldeep Velázquez MD;  Location: [facility] (4TH FLR);  Service: Endoscopy;  Laterality: N/A;  11/4-eliquis hold ok see te -tb-fully vacc-inst mail and verbal-    EXTRACORPOREAL SHOCK WAVE LITHOTRIPSY      EXTRACTION - STONE Right 5/12/2023    Procedure: EXTRACTION - STONE;  Surgeon: Laz Rudolph Jr., MD;  Location: [facility] OR 1ST FLR;  Service: Urology;  Laterality: Right;    INJECTION OF ANESTHETIC AGENT AROUND NERVE Bilateral 6/24/2020    Procedure: BLOCK, NERVE,  C4-C5-C6 MEDIAL BRANCH ok per [place] to add on;  Surgeon: Oscar Ravi MD;  Location: [facility] PAIN MGT;  Service: Pain Management;  Laterality: Bilateral;    INTRAOCULAR PROSTHESES INSERTION Right 10/31/2018    Procedure: INSERTION-INTRAOCULAR LENS (IOL);  Surgeon: Keysha Valle MD;  Location: [facility] OR;  Service: Ophthalmology;  Laterality: Right;    JOINT REPLACEMENT      LASER LITHOTRIPSY Right 5/12/2023    Procedure: LITHOTRIPSY, USING LASER;  Surgeon: Laz Rudolph Jr., MD;  [facility];  Service: Urology;  Laterality: Right;    LASER LITHOTRIPSY Right 7/7/2023    Procedure: LITHOTRIPSY, USING LASER;  Surgeon: Laz Rudolph Jr., MD;  Location: [facility] OR [address] [place] [address];  Service: Urology;  Laterality: Right;    mastoid tumor removal  1969    Left mastoidectomy with residual left facial weakness.    NEPHROSTOMY      OOPHORECTOMY      PHACOEMULSIFICATION OF CATARACT Right 10/31/2018    Procedure: PHACOEMULSIFICATION-ASPIRATION-CATARACT;  Surgeon: Keysha Valle MD;  Location: [facility];  Service: Ophthalmology;  Laterality: Right;    PYELOSCOPY Right 5/12/2023    Procedure: PYELOSCOPY;  Surgeon: Laz Rudolph Jr., MD;  Location: [facility] OR [address] [place] [address];  Service: Urology;  Laterality: Right;    PYELOSCOPY Right 7/7/2023    Procedure: PYELOSCOPY;  Surgeon: Laz Rudolph Jr., MD;  [facility];  Service: Urology;  Laterality: Right;    REMOVAL, STENT, URETER Right 5/12/2023    Procedure: REMOVAL, STENT, URETER;  Surgeon: Laz Rudolph Jr., MD;  [facility];  Service: Urology;  Laterality: Right;    REMOVAL, STENT, URETER Right 7/7/2023    Procedure: REMOVAL, STENT, URETER;  Surgeon: Laz Rudolph Jr., MD;  [facility];  Service: Urology;  Laterality: Right;    RETROGRADE PYELOGRAPHY Right 4/19/2023    Procedure: PYELOGRAM, RETROGRADE;  Surgeon: Domo Bates MD;  [facility];  Service: Urology;  Laterality: Right;    TOTAL ABDOMINAL HYSTERECTOMY   1969    TAHBSO    TOTAL KNEE ARTHROPLASTY  9/13/2012    Left    TRANSFORAMINAL EPIDURAL INJECTION OF STEROID Bilateral 9/17/2019    Procedure: INJECTION, STEROID, EPIDURAL, TRANSFORAMINAL APPROACH;  Surgeon: Oscar Ravi MD;  Location: [facility];  Service: Pain Management;  Laterality: Bilateral;  B/L TFESI L4/L5    TRANSFORAMINAL EPIDURAL INJECTION OF STEROID Bilateral 10/2/2020    Procedure: INJECTION, STEROID, EPIDURAL, TRANSFORAMINAL APPROACH;  Surgeon: Oscar Ravi MD;  Location: [facility];  Service: Pain Management;  Laterality: Bilateral;  B/L TFESI L4/5    TRANSFORAMINAL EPIDURAL INJECTION OF STEROID Bilateral 5/7/2021    Procedure: INJECTION, STEROID, EPIDURAL, TRANSFORAMINAL APPROACH, L4-L5 clear to hold Eliquis 3 days;  Surgeon: Oscar Ravi MD;  Location: [facility];  Service: Pain Management;  Laterality: Bilateral;    URETERAL STENT PLACEMENT Right 4/19/2023    Procedure: INSERTION, STENT, URETER;  Surgeon: Domo Bates MD;  Location: [facility] OR [address] [place] [address];  Service: Urology;  Laterality: Right;    URETERAL STENT PLACEMENT N/A 5/12/2023    Procedure: INSERTION, STENT, URETER;  Surgeon: Laz Rudolph Jr., MD;  [facility];  Service: Urology;  Laterality: N/A;    URETERAL STENT PLACEMENT Right 7/7/2023    Procedure: INSERTION, STENT, URETER;  Surgeon: Laz Rudolph Jr., MD;  [facility];  Service: Urology;  Laterality: Right;    URETEROSCOPY Right 5/12/2023    Procedure: URETEROSCOPY;  Surgeon: Laz Rudolph Jr., MD;  [facility];  Service: Urology;  Laterality: Right;    URETEROSCOPY Right 7/7/2023    Procedure: URETEROSCOPY;  Surgeon: Laz Rudolph Jr., MD;  [facility];  Service: Urology;  Laterality: Right;     Family History   Problem Relation Name Age of Onset    Sleep apnea Sister Coco     Diabetes Sister Coco     Cancer Mother          brain tumor    Hypertension Mother      Heart disease Father      Heart  attack Father      Dementia Brother Abdulaziz     Lupus Brother Abdulaziz     Frontotemporal dementia Brother Abdulaziz     No Known Problems Daughter Corie     No Known Problems Son Calvin     Diabetes Sister Cristy     Lupus Sister Cristy     Breast cancer Sister Cristy     Diabetes Sister Geo Mahsa     Cancer Sister Kenzie         breast cancer    Heart attack Sister Sarah     Diabetes Sister Coco     Liver cancer Brother Wayne     Drug abuse Brother Wayne     Alcohol abuse Brother Wayne     Cirrhosis Brother Hamilton     Drug abuse Brother Hamilton     No Known Problems Daughter April     No Known Problems Son Akin     No Known Problems Son Raz     No Known Problems Maternal Grandmother      No Known Problems Maternal Grandfather      No Known Problems Paternal Grandmother      No Known Problems Paternal Grandfather      Diabetes Brother Al     Dementia Brother Al     Diabetes Other      Ovarian cancer Other          Niece     Breast cancer Maternal Aunt      No Known Problems Maternal Uncle      No Known Problems Paternal Aunt      No Known Problems Paternal Uncle      Glaucoma Neg Hx      Blindness Neg Hx      Celiac disease Neg Hx      Colon cancer Neg Hx      Colon polyps Neg Hx      Esophageal cancer Neg Hx      Inflammatory bowel disease Neg Hx      Liver disease Neg Hx      Rectal cancer Neg Hx      Stomach cancer Neg Hx      Ulcerative colitis Neg Hx      Melanoma Neg Hx      Multiple sclerosis Neg Hx      Psoriasis Neg Hx      Amblyopia Neg Hx      Cataracts Neg Hx      Macular degeneration Neg Hx      Retinal detachment Neg Hx      Strabismus Neg Hx      Stroke Neg Hx      Thyroid disease Neg Hx       Social History     Tobacco Use    Smoking status: Former     Current packs/day: 0.00     Average packs/day: 1 pack/day for 15.0 years (15.0 ttl pk-yrs)     Types: Cigarettes     Start date: 1985     Quit date: 2000     Years since quittin.5    Smokeless tobacco: Never   Substance Use Topics     Alcohol use: No    Drug use: No     Review of Systems   All other systems reviewed and are negative.      Physical Exam     Initial Vitals [01/19/25 2135]   BP Pulse Resp Temp SpO2   (!) 230/108 77 17 98.2 °F (36.8 °C) 96 %      MAP       --         Physical Exam    Nursing note and vitals reviewed.  Constitutional: She appears well-developed and well-nourished.   HENT:   Head: Normocephalic.   Right Ear: External ear normal.   Left Ear: External ear normal.   Nose: Nose normal. Mouth/Throat: Oropharynx is clear and moist.   Prominent left supraorbital ecchymosis without skin abrasion laceration.   Eyes: Conjunctivae and EOM are normal. Pupils are equal, round, and reactive to light.       Prominent ecchymosis of the supra orbit on the left side as mentioned prior.  Extraocular motion intact with no signs of entrapment.  Absent parasympathetic reflex on monitor while eliciting EOM exam   Neck: Neck supple.   Normal range of motion.  Cardiovascular:  Normal rate, regular rhythm, normal heart sounds and intact distal pulses.           Pulmonary/Chest: Breath sounds normal.   Abdominal: Abdomen is soft. Bowel sounds are normal.   Musculoskeletal:         General: Normal range of motion.      Cervical back: Normal range of motion and neck supple.     Neurological: She is alert and oriented to person, place, and time. She has normal strength. GCS score is 15. GCS eye subscore is 4. GCS verbal subscore is 5. GCS motor subscore is 6.   Skin: Skin is warm and dry. Capillary refill takes less than 2 seconds.   Psychiatric: She has a normal mood and affect. Her behavior is normal. Judgment and thought content normal.         ED Course   Procedures  Labs Reviewed   CBC W/ AUTO DIFFERENTIAL - Abnormal       Result Value    WBC 11.23      RBC 4.68      Hemoglobin 12.7      Hematocrit 40.2      MCV 86      MCH 27.1      MCHC 31.6 (*)     RDW 13.7      Platelets 217      MPV 11.3      Immature Granulocytes 0.4      Gran # (ANC)  7.0      Immature Grans (Abs) 0.04      Lymph # 2.7      Mono # 1.0      Eos # 0.4      Baso # 0.06      nRBC 0      Gran % 62.6      Lymph % 23.8      Mono % 9.2      Eosinophil % 3.5      Basophil % 0.5      Differential Method Automated     COMPREHENSIVE METABOLIC PANEL - Abnormal    Sodium 142      Potassium 4.2      Chloride 103      CO2 24      Glucose 154 (*)     BUN 16      Creatinine 1.2      Calcium 10.4      Total Protein 8.2      Albumin 3.5      Total Bilirubin 0.4      Alkaline Phosphatase 93      AST 22      ALT 14      eGFR 44.6 (*)     Anion Gap 15     PROTIME-INR    Prothrombin Time 12.0      INR 1.1     POCT GLUCOSE MONITORING CONTINUOUS          Imaging Results              CT Cervical Spine Without Contrast (Final result)  Result time 01/20/25 00:07:17      Final result by Darwin Zurita MD (01/20/25 00:07:17)                   Impression:      Stable cervical spondylosis and changes of DISH with bulky anterior longitudinal ligament ossification.    No new fracture or subluxation.    Central canal and foraminal stenosis most prevalent at C5-6 due to hypertrophic changes.      Electronically signed by: Darwin Zurita  Date:    01/20/2025  Time:    00:07               Narrative:    EXAMINATION:  CT CERVICAL SPINE WITHOUT CONTRAST    CLINICAL HISTORY:  Neck trauma (Age >= 65y);    TECHNIQUE:  Noncontrast CT images of the cervical spine spine. Axial, coronal, and sagittal reformatted images were obtained.    COMPARISON:  The 01/02/2020 CT cervical spine    FINDINGS:  Alignment is anatomic.  Multilevel spondylosis with a large anterior bulky osteophytes and ossification of the anterior longitudinal ligament.  There is no evidence of cortical fracture, subluxation or soft tissue swelling or hematoma.  Calcified annulus and disc bulge at C5-6 cause mild to moderate central canal stenosis with bony foraminal encroachment at this level bilaterally left greater than right.    Is postoperative  changes of the left middle ear.  Cervical cranial and cervicothoracic junction is maintained.  Visceral spaces of the neck with atherosclerotic calcifications in the carotid arteries and in the top of the aortic arch.  Lung apices appear clear.                                       CT Maxillofacial Without Contrast (Final result)  Result time 01/19/25 23:43:48      Final result by Darwin Zurita MD (01/19/25 23:43:48)                   Impression:      Negative CT of the brain allowing for involutional changes..    Preseptal soft tissue swelling and frontal scalp hematoma on the left with no underlying fracture or maxillofacial abnormality outside of the hematoma.      Electronically signed by: Darwin Zurita  Date:    01/19/2025  Time:    23:43               Narrative:    EXAMINATION:  CT HEAD WITHOUT CONTRAST; CT MAXILLOFACIAL WITHOUT CONTRAST    CLINICAL HISTORY:  Head trauma, minor (Age >= 65y);; Facial trauma, blunt;    TECHNIQUE:  Low dose axial images were obtained through the head, maxillofacial region .  Coronal and sagittal reformations were also performed. Contrast was not administered.    COMPARISON:  CT brain,  01/02/2020.    FINDINGS:  BRAIN:    Brain parenchyma appears normal in attenuation with normal gray-white matter differentiation.  Chronic small vessel changes and involutional changes are again noted.  No mass, mass effect or pathologic fluid collection.    Ventricles and basilar cisterns appear appropriate.    Hematoma over the left supraorbital region laterally into the preseptal soft tissues of the left orbit.  Optic globe is intact.  Calvarium intact.  Sinuses are clear with no significant middle ear or mastoid opacity.  Left mastoids are not pneumatized and defect raises question of ENT middle ear surgery.  Orbits and orbital contents unremarkable.    MAXILLOFACIAL REGION:    Maxillofacial bones intact without displaced fracture.    Left supraorbital and preseptal soft tissue  swelling and hematoma..    Visceral spaces of the face and upper neck unremarkable.    Cervical spondylosis.  Ossification of the stylohyoid ligament upper half.                                       CT Head Without Contrast (Final result)  Result time 01/19/25 23:43:48      Final result by Darwin Zurita MD (01/19/25 23:43:48)                   Impression:      Negative CT of the brain allowing for involutional changes..    Preseptal soft tissue swelling and frontal scalp hematoma on the left with no underlying fracture or maxillofacial abnormality outside of the hematoma.      Electronically signed by: Darwin Zurita  Date:    01/19/2025  Time:    23:43               Narrative:    EXAMINATION:  CT HEAD WITHOUT CONTRAST; CT MAXILLOFACIAL WITHOUT CONTRAST    CLINICAL HISTORY:  Head trauma, minor (Age >= 65y);; Facial trauma, blunt;    TECHNIQUE:  Low dose axial images were obtained through the head, maxillofacial region .  Coronal and sagittal reformations were also performed. Contrast was not administered.    COMPARISON:  CT brain,  01/02/2020.    FINDINGS:  BRAIN:    Brain parenchyma appears normal in attenuation with normal gray-white matter differentiation.  Chronic small vessel changes and involutional changes are again noted.  No mass, mass effect or pathologic fluid collection.    Ventricles and basilar cisterns appear appropriate.    Hematoma over the left supraorbital region laterally into the preseptal soft tissues of the left orbit.  Optic globe is intact.  Calvarium intact.  Sinuses are clear with no significant middle ear or mastoid opacity.  Left mastoids are not pneumatized and defect raises question of ENT middle ear surgery.  Orbits and orbital contents unremarkable.    MAXILLOFACIAL REGION:    Maxillofacial bones intact without displaced fracture.    Left supraorbital and preseptal soft tissue swelling and hematoma..    Visceral spaces of the face and upper neck unremarkable.    Cervical  spondylosis.  Ossification of the stylohyoid ligament upper half.                                       Medications   morphine injection 4 mg (4 mg Intravenous Given 1/19/25 2215)   acetaminophen tablet 1,000 mg (1,000 mg Oral Given 1/19/25 2228)     Medical Decision Making  84-year-old female as described above, with multiple comorbidities including HTN, HLD, CHF, CAD, DM2, DVT on Eliquis presents to the ED after GLF of mechanical nature.  Left supraorbital prominent ecchymosis without laceration or abrasion.  EOM intact with absent parasympathetic reflex to indicate entrapment.  Cranial nerves 2-12 grossly intact with right-sided facial droop at baseline.  Hemodynamically stable normal vital signs.  Pain well controlled with single dose IV morphine.  We will order basic labs, EKG, head and neck imaging.    Update/re-evaluation:  As per my interpretation, CT max face without orbital blowout fractures, entrapment, skull fractures or pneumo encephalitis to indicate sinus compromise.  Prominent ecchymosis noted and consistent with physical exam.  Falx cerebri midline without evidence of intracranial hemorrhage or mass effect.  No fractures appreciated on cervical spine imaging.  Laboratory studies grossly normal.  Pain still controlled with moderate management.      Patient is signed out to oncoming provider, Dr. Maximo Xie (attending) and Dr. Kris herr (ED resident) pending final CT imaging reads and disposition.  I foresee this patient being able to discharge home pending no abnormalities on imaging or other injuries discovered.     Amount and/or Complexity of Data Reviewed  Labs: ordered.  Radiology: ordered.    Risk  OTC drugs.  Prescription drug management.                                      Clinical Impression:  Final diagnoses:  [W19.XXXA] Fall  [S50.02XA] Contusion of left elbow, initial encounter (Primary)  [W18.30XA] Ground-level fall                 Tere Lerner MD  Resident  01/20/25  0011

## 2025-01-20 NOTE — PROVIDER PROGRESS NOTES - EMERGENCY DEPT.
"Encounter Date: 1/19/2025    ED Physician Progress Notes        Physician Note:   Physician Note:   Signout Note  I received signout from the previous providers, Dr Jose De Luna and Dr Tere Lerner.      Chief complaint:  Ground level fall     Per sign out and chart review:  Per initial provider's HPI: " 84-year-old female with multiple comorbidities including HTN, HLD, CHF, CAD, DM2, DVT on Eliquis presents to the ED regarding facial swelling and head injury as a result of GLF of mechanical nature without LOC. Patient was getting out of car and walking on sidewalk when she tripped. Pt hit left forehead and eye, hematoma present. AAOx4, GCS 15. Pt endorses 10/10 pain to left eye without vision loss but slight blurring due to surrounding swelling. Pt also reports bilateral shoulder pain. Pt denies SOB and chest pain.   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 "    During ED stay patient received:  Medications  morphine injection 4 mg (4 mg Intravenous Given 1/19/25 2215)  acetaminophen tablet 1,000 mg (1,000 mg Oral Given 1/19/25 2228)     Pt signed out to me pending:  Lab results and imaging     Update/ Disposition:  Labs and imaging unremarkable, intra-ocular pressure 20 OD and 16 OS, no concern for ocular compartment syndrome given significant periorbital edema.  Patient likely safe to discharge home with ophthalmology referral for evaluation and management of significant eyelid swelling.     Patient, caregiver and/or family understands the plan and verbalized agreement. All questions answered.      Diagnostic Impression:    :  Fall     Signed,    Clay Farrar MD  Emergency Medicine, PGY-1  [facility]           "

## 2025-01-20 NOTE — ED NOTES
Educated granddaughter and pt on [email or web address] and  [facility] and helped them to learn to navigate the diana. Pt and granddaughter verbalized the understanding of picking up pt prescriptions at [facility]. Granddaughter will request sooner appointment then feb 4 If pt not feeling better. Pt will check her BP and weight every am and notify primary dr if she gains over 3 lbs and or has high BP.

## 2025-01-21 DIAGNOSIS — M79.89 LEG SWELLING: ICD-10-CM

## 2025-01-21 DIAGNOSIS — I10 ESSENTIAL HYPERTENSION: Chronic | ICD-10-CM

## 2025-01-21 DIAGNOSIS — G25.81 RLS (RESTLESS LEGS SYNDROME): ICD-10-CM

## 2025-01-21 NOTE — TELEPHONE ENCOUNTER
----- Message from Lily sent at 1/21/2025 12:15 PM CST -----  Contact: [phone]  [email or web address]     Patient is calling for Medical Advice regarding:pt is calling need Approval to get meds sent to her home for delivery due to her age. Granddaughter Stella Oden [phone] is calling. The Pharmacy number is [phone] [facility].    How long has patient had these symptoms:    Pharmacy name and phone#:    Patient wants a call back or thru [email or web address]:Callback    Comments:    Please advise patient replies from provider may take up to 48 hours.

## 2025-01-22 RX ORDER — ROPINIROLE 0.5 MG/1
0.5 TABLET, FILM COATED ORAL NIGHTLY
Qty: 90 TABLET | Refills: 3 | Status: SHIPPED | OUTPATIENT
Start: 2025-01-22

## 2025-01-22 RX ORDER — AMLODIPINE BESYLATE 5 MG/1
5 TABLET ORAL DAILY
Qty: 90 TABLET | Refills: 3 | Status: SHIPPED | OUTPATIENT
Start: 2025-01-22 | End: 2026-01-22

## 2025-01-22 RX ORDER — FUROSEMIDE 20 MG/1
20 TABLET ORAL EVERY MORNING
Qty: 90 TABLET | Refills: 3 | Status: SHIPPED | OUTPATIENT
Start: 2025-01-22 | End: 2026-01-22

## 2025-01-22 RX ORDER — CARVEDILOL 25 MG/1
25 TABLET ORAL 2 TIMES DAILY
Qty: 180 TABLET | Refills: 1 | Status: SHIPPED | OUTPATIENT
Start: 2025-01-22

## 2025-01-22 RX ORDER — ATORVASTATIN CALCIUM 40 MG/1
40 TABLET, FILM COATED ORAL DAILY
Qty: 90 TABLET | Refills: 3 | Status: SHIPPED | OUTPATIENT
Start: 2025-01-22

## 2025-01-22 NOTE — TELEPHONE ENCOUNTER
Called patient granddaughter. She states the pharmacy wants to do mail order for the patient but new prescriptions are needed. Patient granddaughter states because of her age she can't pick the patient medication up so mail order is better.

## 2025-01-22 NOTE — TELEPHONE ENCOUNTER
Care Due:                  Date            Visit Type   Department     Provider  --------------------------------------------------------------------------------                                EP -                              PRIMARY      [facility] INTER[facility]  Last Visit: 11-      CARE ([facility])   MEDICINE       FEDERICO MARCIAL                              EP -                              PRIMARY      [facility] INTERNAL  Next Visit: 03-      CARE ([facility])   MEDICINE       FEDERICO MARCIAL                                                            Last  Test          Frequency    Reason                     Performed    Due Date  --------------------------------------------------------------------------------    Mg Level....  12 months..  magnesium................  01- 01-    [employer] Embedded Care Due Messages. Reference number: [id number].   1/22/2025 9:18:40 AM CST

## 2025-01-24 ENCOUNTER — TELEPHONE (OUTPATIENT)
Dept: OPHTHALMOLOGY | Facility: CLINIC | Age: 85
End: 2025-01-24
Payer: MEDICARE

## 2025-01-28 ENCOUNTER — OFFICE VISIT (OUTPATIENT)
Dept: INTERNAL MEDICINE | Facility: CLINIC | Age: 85
End: 2025-01-28
Payer: MEDICARE

## 2025-01-28 VITALS
HEART RATE: 88 BPM | BODY MASS INDEX: 31.04 KG/M2 | WEIGHT: 204.81 LBS | HEIGHT: 68 IN | SYSTOLIC BLOOD PRESSURE: 130 MMHG | OXYGEN SATURATION: 99 % | DIASTOLIC BLOOD PRESSURE: 100 MMHG

## 2025-01-28 DIAGNOSIS — G89.4 CHRONIC PAIN SYNDROME: ICD-10-CM

## 2025-01-28 DIAGNOSIS — I10 UNCONTROLLED HYPERTENSION: ICD-10-CM

## 2025-01-28 DIAGNOSIS — S05.12XD PERIORBITAL CONTUSION OF LEFT EYE, SUBSEQUENT ENCOUNTER: Primary | ICD-10-CM

## 2025-01-28 PROCEDURE — 3080F DIAST BP >= 90 MM HG: CPT | Mod: HCNC,CPTII,S$GLB, | Performed by: INTERNAL MEDICINE

## 2025-01-28 PROCEDURE — 3072F LOW RISK FOR RETINOPATHY: CPT | Mod: HCNC,CPTII,S$GLB, | Performed by: INTERNAL MEDICINE

## 2025-01-28 PROCEDURE — 1160F RVW MEDS BY RX/DR IN RCRD: CPT | Mod: HCNC,CPTII,S$GLB, | Performed by: INTERNAL MEDICINE

## 2025-01-28 PROCEDURE — 1159F MED LIST DOCD IN RCRD: CPT | Mod: HCNC,CPTII,S$GLB, | Performed by: INTERNAL MEDICINE

## 2025-01-28 PROCEDURE — 3075F SYST BP GE 130 - 139MM HG: CPT | Mod: HCNC,CPTII,S$GLB, | Performed by: INTERNAL MEDICINE

## 2025-01-28 PROCEDURE — 3288F FALL RISK ASSESSMENT DOCD: CPT | Mod: HCNC,CPTII,S$GLB, | Performed by: INTERNAL MEDICINE

## 2025-01-28 PROCEDURE — 1125F AMNT PAIN NOTED PAIN PRSNT: CPT | Mod: HCNC,CPTII,S$GLB, | Performed by: INTERNAL MEDICINE

## 2025-01-28 PROCEDURE — 99213 OFFICE O/P EST LOW 20 MIN: CPT | Mod: HCNC,S$GLB,, | Performed by: INTERNAL MEDICINE

## 2025-01-28 PROCEDURE — 1101F PT FALLS ASSESS-DOCD LE1/YR: CPT | Mod: HCNC,CPTII,S$GLB, | Performed by: INTERNAL MEDICINE

## 2025-01-28 PROCEDURE — 99999 PR PBB SHADOW E&M-EST. PATIENT-LVL III: CPT | Mod: PBBFAC,HCNC,, | Performed by: INTERNAL MEDICINE

## 2025-01-28 RX ORDER — OXYCODONE AND ACETAMINOPHEN 10; 325 MG/1; MG/1
1 TABLET ORAL
Qty: 150 TABLET | Refills: 0 | Status: SHIPPED | OUTPATIENT
Start: 2025-01-28

## 2025-01-28 NOTE — TELEPHONE ENCOUNTER
No care due was identified.  [facility] Embedded Care Due Messages. Reference number: [id number].   1/28/2025 8:42:29 AM CST

## 2025-01-28 NOTE — PROGRESS NOTES
Subjective:       Patient ID: Henrietta Villasenor is a 84 y.o. female.    Chief Complaint: Fall (Swollen face/eye-left side x1 week ago )      Henrietta Villasenor is 84 y.o. female who presents for ER follow up s/p fall last week with trauma to her face.  Pt was seen in ED on Jan 19 for fall with trauma to her face.  CT negative for fracture.  Pt with left eye pain 10/10 without radiation.  Pt has old percocet and was taking that for pain with pain control.  Pt has refill that she can  from the pharmacy.  Pt is icing contusion on eye 2x/day.      Pt had elevated BP in ED thought to be secondary to pain.  Pt checks BP with wrist cuff with -200/40-?    Review of Systems   Constitutional:  Negative for chills and fever.   Eyes:  Negative for visual disturbance.   Respiratory:  Negative for shortness of breath.    Cardiovascular:  Negative for chest pain.   Gastrointestinal:  Negative for abdominal pain, constipation, diarrhea, nausea and vomiting.         Objective:      Physical Exam  Vitals reviewed.   Constitutional:       General: She is awake.      Appearance: Normal appearance.   HENT:      Head: Normocephalic and atraumatic.      Mouth/Throat:      Mouth: Mucous membranes are moist.      Pharynx: Oropharynx is clear.   Eyes:      Extraocular Movements: Extraocular movements intact.      Conjunctiva/sclera: Conjunctivae normal.      Pupils: Pupils are equal, round, and reactive to light.     Cardiovascular:      Rate and Rhythm: Normal rate and regular rhythm.      Heart sounds: No murmur heard.     No friction rub. No gallop.   Pulmonary:      Effort: Pulmonary effort is normal.      Breath sounds: Normal breath sounds.   Abdominal:      General: Bowel sounds are normal. There is no distension.      Tenderness: There is no abdominal tenderness. There is no guarding or rebound.   Musculoskeletal:      Right lower leg: No edema.      Left lower leg: No edema.   Lymphadenopathy:      Cervical: No cervical  adenopathy.   Skin:     Findings: Bruising (around left eye) and ecchymosis (around left eye) present.      Comments: Hematoma above left eyebrow   Neurological:      Mental Status: She is alert and oriented to person, place, and time.   Psychiatric:         Mood and Affect: Mood normal.         Behavior: Behavior is cooperative.       Assessment:       1. Periorbital contusion of left eye, subsequent encounter    2. Uncontrolled hypertension        Plan:     Pt with periorbital contusion of left eye s/p fall.  CT showed no fracture.  Pt will  refill of per percocet which she can use for breakthrough pain.  Recommend tylenol 1 gram po BID prn pain.  Pt counseled that wound will take 1-2 weeks to heal completely.  Patient was advised to follow up if symptom are not improving or worsened.    Pt with uncontrolled HTN.  It is unclear if secondary to pain although record review shows that pt has had elevated BP at previous visits.  Recommend pt continue to monitor BP and f/u with PCP for repeat evaluation of uncontrolled BP.      Henrietta was seen today for fall.    Diagnoses and all orders for this visit:    Periorbital contusion of left eye, subsequent encounter    Uncontrolled hypertension  -     Ambulatory referral/consult to Pharmacy Assistance; Future

## 2025-02-10 ENCOUNTER — OFFICE VISIT (OUTPATIENT)
Dept: OTOLARYNGOLOGY | Facility: CLINIC | Age: 85
End: 2025-02-10
Payer: MEDICARE

## 2025-02-10 VITALS
SYSTOLIC BLOOD PRESSURE: 173 MMHG | HEIGHT: 68 IN | HEART RATE: 87 BPM | DIASTOLIC BLOOD PRESSURE: 105 MMHG | BODY MASS INDEX: 31.22 KG/M2 | WEIGHT: 206 LBS

## 2025-02-10 DIAGNOSIS — H90.A22 SENSORINEURAL HEARING LOSS (SNHL) OF LEFT EAR WITH RESTRICTED HEARING OF RIGHT EAR: ICD-10-CM

## 2025-02-10 DIAGNOSIS — G51.0 FACIAL NERVE PARALYSIS: ICD-10-CM

## 2025-02-10 DIAGNOSIS — Z90.89 HISTORY OF LEFT MASTOIDECTOMY: Primary | ICD-10-CM

## 2025-02-10 PROCEDURE — 3080F DIAST BP >= 90 MM HG: CPT | Mod: CPTII,S$GLB,, | Performed by: STUDENT IN AN ORGANIZED HEALTH CARE EDUCATION/TRAINING PROGRAM

## 2025-02-10 PROCEDURE — 3077F SYST BP >= 140 MM HG: CPT | Mod: CPTII,S$GLB,, | Performed by: STUDENT IN AN ORGANIZED HEALTH CARE EDUCATION/TRAINING PROGRAM

## 2025-02-10 PROCEDURE — 1126F AMNT PAIN NOTED NONE PRSNT: CPT | Mod: CPTII,S$GLB,, | Performed by: STUDENT IN AN ORGANIZED HEALTH CARE EDUCATION/TRAINING PROGRAM

## 2025-02-10 PROCEDURE — 3288F FALL RISK ASSESSMENT DOCD: CPT | Mod: CPTII,S$GLB,, | Performed by: STUDENT IN AN ORGANIZED HEALTH CARE EDUCATION/TRAINING PROGRAM

## 2025-02-10 PROCEDURE — 1101F PT FALLS ASSESS-DOCD LE1/YR: CPT | Mod: CPTII,S$GLB,, | Performed by: STUDENT IN AN ORGANIZED HEALTH CARE EDUCATION/TRAINING PROGRAM

## 2025-02-10 PROCEDURE — 1159F MED LIST DOCD IN RCRD: CPT | Mod: CPTII,S$GLB,, | Performed by: STUDENT IN AN ORGANIZED HEALTH CARE EDUCATION/TRAINING PROGRAM

## 2025-02-10 PROCEDURE — 99204 OFFICE O/P NEW MOD 45 MIN: CPT | Mod: S$GLB,,, | Performed by: STUDENT IN AN ORGANIZED HEALTH CARE EDUCATION/TRAINING PROGRAM

## 2025-02-10 PROCEDURE — 3072F LOW RISK FOR RETINOPATHY: CPT | Mod: CPTII,S$GLB,, | Performed by: STUDENT IN AN ORGANIZED HEALTH CARE EDUCATION/TRAINING PROGRAM

## 2025-02-10 NOTE — PROGRESS NOTES
Ear, Nose, & Throat  Otolaryngology - Head & Neck Surgery      Subjective:     Chief Complaint:   Chief Complaint   Patient presents with    Cerumen Impaction       Henrietta Villasenor is a 84 y.o. female who was self-referred for chronic, progressive hearing loss.     She has a remote history of left-sided ear surgery a [facility] many decades ago.  Scars consistent with a left mastoidectomy and parotidectomy.  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  She has cognitive impairment with memory loss.  History difficult to obtain.  She does wear hearing aid in the right.  She does not believe that her hearing aids are working well.  Thinks that they have not been adjusted many years.  No other otologic complaints.    Previously saw Dr Zhang in 2014. Profound deafness AS at that time.     Can achieve full eye closure.  Denies any issues with oral competence.  Last saw Optometry April 20, 2024.    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    Neuralgia, neuritis, and radiculitis, unspecified 11/30/2010    Abdominal pain, unspecified site 07/26/2011    Ureteral calculus 02/22/2011    Dermatitis due to drugs and medicines taken internally(693.0) 05/25/2011    Dermatochalasis 10/06/2010    Personal history of allergy to sulfonamides 09/20/2011    Chronic diastolic heart failure 09/12/2012    Knee pain 04/09/2013    Anemia 11/07/2013    Chronic right ear pain 03/07/2014    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 03/07/2014    Hearing loss, sensorineural 03/20/2014    Stage 3a chronic kidney disease 05/14/2014    Coronary artery  disease involving native coronary artery of native heart with angina pectoris 05/14/2014    Lactose intolerance 06/03/2014    Allergic rhinitis 05/05/2015    Hyperlipidemia 06/19/2015    Aortic atherosclerosis 06/19/2015    Spinal enthesopathy of lumbar region 06/19/2015    LLQ abdominal pain 07/24/2015    Diverticulitis of large intestine without perforation or abscess without bleeding 07/27/2015    Esophageal dysphagia 09/15/2015    Fatty liver 09/15/2015    Painful swallowing 02/17/2016    Left facial numbness     Neck pain on left side     Type 2 diabetes mellitus, with long-term current use of insulin 05/11/2016    Shortness of breath     Diabetic neuropathy associated with type 2 diabetes mellitus 08/26/2016    Type 2 diabetes mellitus with stage 3 chronic kidney disease, with long-term current use of insulin 08/31/2016    Obesity (BMI 30.0-34.9) 08/31/2016    Low hemoglobin and low hematocrit 11/16/2016    History of colon polyps 06/14/2017    Type 2 diabetes mellitus with diabetic polyneuropathy, with long-term current use of insulin 08/20/2017    Obesity, diabetes, and hypertension syndrome 08/20/2017    Spinal stenosis of lumbar region with neurogenic claudication 02/23/2018    Benign paroxysmal positional vertigo due to bilateral vestibular disorder 05/18/2018    Tortuous aorta 06/05/2018    Ectatic aorta 06/05/2018    Insulin long-term use 06/05/2018    Facet arthropathy 06/05/2018    Mood disorder 08/15/2018    Type 2 diabetes mellitus with hyperglycemia 11/29/2018    Hypovitaminosis D 11/29/2018    Research study patient - TACT2 EDTA Chelation Study 11/29/2018    History of MI (myocardial infarction) 11/29/2018    Facial nerve palsy, secondary 11/29/2018    Gout, arthritis 05/10/2019    Vitreomacular adhesion, right 06/04/2019    Epiretinal membrane, left 06/04/2019    Chronic pain 09/17/2019    Decreased ROM of lumbar spine 12/10/2019    Weakness 12/10/2019    Abnormality of gait and mobility  12/10/2019    Decreased functional activity tolerance 12/10/2019    Chest pain 01/09/2020    Postmenopausal 07/02/2020    Osteopenia 07/07/2020    Thyroid nodule 07/16/2020    Bilateral leg edema 12/05/2020    Neuropathic pain 12/05/2020    Hematoma of arm, left, initial encounter 12/29/2020    Deep vein thrombosis (DVT) of popliteal vein of both lower extremities 02/04/2021    PAD (peripheral artery disease) 02/04/2021    Memory loss 02/10/2021    Venous insufficiency of both lower extremities 04/06/2021    Chronic pain disorder 12/07/2021    Decreased activities of daily living (ADL) 12/07/2021    Impaired functional mobility, balance, gait, and endurance 12/07/2021    Left leg pain 04/07/2022    Chronic bilateral low back pain with sciatica 04/07/2022    At risk for falls 12/27/2022    Hydronephrosis with urinary obstruction due to ureteral calculus 04/19/2023    History of DVT (deep vein thrombosis) 04/26/2023    Hypoglycemia associated with diabetes 04/26/2023    Hypomagnesemia 04/26/2023    Hypokalemia 04/26/2023    Stage 3b chronic kidney disease 06/14/2024     Resolved Ambulatory Problems     Diagnosis Date Noted    Obesity, Class I, BMI 30-34.9     Colon polyps 07/29/2012    E. coli sepsis 07/29/2012    Type 2 diabetes mellitus with diabetic polyneuropathy 07/31/2012    Peripheral neuropathy 07/31/2012    Urinary tract infection, site not specified 05/24/2011    Unspecified mastoiditis 03/30/2011    Arthropathy, unspecified, site unspecified 02/28/2011    Chronic kidney disease, unspecified 10/06/2010    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 01/10/2011    Coronary atherosclerosis of native coronary artery 02/28/2011    Diverticulosis of large intestine 11/01/2010    Duodenal ulcer, unspecified as acute or chronic, without hemorrhage, perforation, or obstruction 08/24/2011    Edema 11/08/2010    Esophageal reflux 02/28/2011    Localized osteoarthrosis not specified whether primary  or secondary, lower leg 12/12/2011    Migraine, unspecified, without mention of intractable migraine without mention of status migrainosus 02/28/2011    Nonspecific abnormal results of other specified function study 09/20/2011    Osteoarthrosis, unspecified whether generalized or localized, lower leg 01/02/2012    Other dyspnea and respiratory abnormality 02/28/2011    Herpes zoster with other nervous system complications(053.19) 02/28/2011    Community acquired pneumonia 05/04/2012    Screening for glaucoma 09/05/2011    Other specified prophylactic or treatment measure 10/01/2012    Long term (current) use of anticoagulants 10/01/2012    S/P knee replacement 10/02/2012    S/P TKR (total knee replacement) 12/26/2012    Sepsis(995.91) 06/12/2013    Cough with sputum 06/13/2013    Right ear impacted cerumen 03/07/2014    Diabetes 08/06/2014    Back pain 02/23/2015    Hypertension associated with diabetes 06/19/2015    Major depressive disorder, single episode, mild 06/19/2015    Mesenteric artery stenosis 09/15/2015    Obesity, Class II, BMI 35-39.9, with comorbidity 09/15/2015    Type 2 diabetes mellitus with complication 10/02/2015    Tortuous aorta 03/28/2016    Ectatic aorta 03/28/2016    Atypical chest pain 04/06/2016    Decreased strength of trunk and back 10/25/2016    Hyperparathyroidism, unspecified     Senile nuclear sclerosis 03/22/2018    Hypercholesterolemia 11/29/2018    Lumbar spondylosis 01/31/2019    Myocardial infarction     Alteration in performance of activities of daily living 12/07/2021    Impaired functional mobility and activity tolerance 12/07/2021    HANNAH (acute kidney injury) 04/19/2023     Past Medical History:   Diagnosis Date    Abnormality of lung 11/08/2011    Anxiety     Arthritis     CAD (coronary artery disease)     Calculus of ureter 2/22/2011    CHF (congestive heart failure)     Colon polyp 9/2010; 11/2010    Depression     Diabetes mellitus     Diabetes mellitus type II      Diverticulitis large intestine 7/27/2015    Diverticulosis 09/25/2010; 11/02/2010; 11/08/2011; 7/29/2012    Duodenal disorder 08/25/2011    Duodenal ulcer, unspecified as acute or chronic, without hemorrhage, perforation, or obstruction 8/24/2011    Esophageal dysmotility 01/24/2012    Facial weakness 1969    Gastric polyp 09/29/2010    GERD (gastroesophageal reflux disease) 7/29/2012    Hepatomegaly 11/08/2011    Hiatal hernia 06/26/2006; 09/29/2010; 08/25/2011    HTN (hypertension)     Migraine, unspecified, without mention of intractable migraine without mention of status migrainosus 2/28/2011    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    Obesity     MARLON (obstructive sleep apnea)     Pain     Pneumonia     Polyneuropathy     Postherpetic neuralgia     Recurrent nephrolithiasis     Sensorineural hearing loss of both ears     Thyroid disease 11/08/2011    Trouble in sleeping     Type II or unspecified type diabetes mellitus with neurological manifestations, not stated as uncontrolled(250.60)     Type II or unspecified type diabetes mellitus with peripheral circulatory disorders, not stated as uncontrolled(250.70)     Type II or unspecified type diabetes mellitus with renal manifestations, not stated as uncontrolled(250.40)        Past Surgical History  She has a past surgical history that includes Cardiac catheterization; Extracorporeal shock wave lithotripsy; mastoid tumor removal (1969); Nephrostomy; Carpal tunnel release (3/13/2012); Blepharoptosis repair; Total knee arthroplasty (9/13/2012); Cholecystectomy; Colonoscopy (N/A, 11/16/2016); Colonoscopy (N/A, 6/14/2017); Oophorectomy; Joint replacement; Phacoemulsification of cataract (Right, 10/31/2018); Intraocular prosthesis insertion (Right, 10/31/2018); Total abdominal hysterectomy (1969); Transforaminal epidural injection of steroid (Bilateral, 9/17/2019); Cataract extraction w/  intraocular lens implant (Right, 10/31/2018);  Cataract extraction w/  intraocular lens implant (Left, 03/22/2018); Injection of anesthetic agent around nerve (Bilateral, 6/24/2020); Transforaminal epidural injection of steroid (Bilateral, 10/2/2020); Transforaminal epidural injection of steroid (Bilateral, 5/7/2021); Esophagogastroduodenoscopy (N/A, 11/10/2021); Cystoscopy (4/19/2023); Ureteral stent placement (Right, 4/19/2023); Retrograde pyelography (Right, 4/19/2023); Cystoscopy (N/A, 5/12/2023); Ureteroscopy (Right, 5/12/2023); Laser lithotripsy (Right, 5/12/2023); Ureteral stent placement (N/A, 5/12/2023); removal, stent, ureter (Right, 5/12/2023); extraction - stone (Right, 5/12/2023); Pyeloscopy (Right, 5/12/2023); Cystoscopy (N/A, 7/7/2023); Ureteroscopy (Right, 7/7/2023); Pyeloscopy (Right, 7/7/2023); Laser lithotripsy (Right, 7/7/2023); removal, stent, ureter (Right, 7/7/2023); and Ureteral stent placement (Right, 7/7/2023).    Past Surgical History:   Procedure Laterality Date    BELPHAROPTOSIS REPAIR      s/p LAMBERTO levator repair - Dr. Dejesus    CARDIAC CATHETERIZATION      CARPAL TUNNEL RELEASE  3/13/2012    CATARACT EXTRACTION W/  INTRAOCULAR LENS IMPLANT Right 10/31/2018        CATARACT EXTRACTION W/  INTRAOCULAR LENS IMPLANT Left 03/22/2018        CHOLECYSTECTOMY      COLONOSCOPY N/A 11/16/2016    Procedure: COLONOSCOPY;  Surgeon: hCris Storey MD;  Location: [facility] ([address] [place] [address]);  Service: Endoscopy;  Laterality: N/A;    COLONOSCOPY N/A 6/14/2017    Procedure: COLONOSCOPY;  Surgeon: Chris Storey MD;  Location: [facility] ([address] [place] [address]);  Service: Endoscopy;  Laterality: N/A;  colonoscopy in 3 months with better bowel prep. - Split PEG prep ordered    CYSTOSCOPY  4/19/2023    Procedure: CYSTOSCOPY;  Surgeon: Domo Bates MD;  Location: [facility] OR [address] [place] [address];  Service: Urology;;    CYSTOSCOPY N/A 5/12/2023    Procedure: CYSTOSCOPY;  Surgeon: Laz Rudolph Jr., MD;  Location: [facility] OR [address] [place] [address];  Service: Urology;  Laterality:  N/A;    CYSTOSCOPY N/A 7/7/2023    Procedure: CYSTOSCOPY;  Surgeon: Laz Rudolph Jr., MD;  Location: [facility] OR 1ST FLR;  Service: Urology;  Laterality: N/A;    ESOPHAGOGASTRODUODENOSCOPY N/A 11/10/2021    Procedure: EGD (ESOPHAGOGASTRODUODENOSCOPY);  Surgeon: Kuldeep Velázquez MD;  Location: [facility] ENDO (4TH FLR);  Service: Endoscopy;  Laterality: N/A;  11/4-eliquis hold ok see te -tb-fully vacc-inst mail and verbal-    EXTRACORPOREAL SHOCK WAVE LITHOTRIPSY      EXTRACTION - STONE Right 5/12/2023    Procedure: EXTRACTION - STONE;  Surgeon: Laz Rudolph Jr., MD;  Location: [facility] OR 1ST FLR;  Service: Urology;  Laterality: Right;    INJECTION OF ANESTHETIC AGENT AROUND NERVE Bilateral 6/24/2020    Procedure: BLOCK, NERVE, C4-C5-C6 MEDIAL BRANCH ok per Talia to add on;  Surgeon: Oscar Ravi MD;  Location: [facility], [place], operated by [facility] PAIN MGT;  Service: Pain Management;  Laterality: Bilateral;    INTRAOCULAR PROSTHESES INSERTION Right 10/31/2018    Procedure: INSERTION-INTRAOCULAR LENS (IOL);  Surgeon: Keysha Valle MD;  Location: [facility];  Service: Ophthalmology;  Laterality: Right;    JOINT REPLACEMENT      LASER LITHOTRIPSY Right 5/12/2023    Procedure: LITHOTRIPSY, USING LASER;  Surgeon: Laz Rudolph Jr., MD;  [facility];  Service: Urology;  Laterality: Right;    LASER LITHOTRIPSY Right 7/7/2023    Procedure: LITHOTRIPSY, USING LASER;  Surgeon: Laz Rudolph Jr., MD;  [facility];  Service: Urology;  Laterality: Right;    mastoid tumor removal  1969    Left mastoidectomy with residual left facial weakness.    NEPHROSTOMY      OOPHORECTOMY      PHACOEMULSIFICATION OF CATARACT Right 10/31/2018    Procedure: PHACOEMULSIFICATION-ASPIRATION-CATARACT;  Surgeon: Keysha Valle MD;  Location: [facility], [place], [facility] OR;  Service: Ophthalmology;  Laterality: Right;    PYELOSCOPY Right 5/12/2023    Procedure: PYELOSCOPY;  Surgeon: Laz Rudolph Jr., MD;  Location: [facility] OR [address] [place] [address];  Service: Urology;  Laterality: Right;     PYELOSCOPY Right 7/7/2023    Procedure: PYELOSCOPY;  Surgeon: Laz Rudolph Jr., MD;  Location: [facility] OR 1ST FLR;  Service: Urology;  Laterality: Right;    REMOVAL, STENT, URETER Right 5/12/2023    Procedure: REMOVAL, STENT, URETER;  Surgeon: Laz Rudolph Jr., MD;  Location: [facility] OR 1ST FLR;  Service: Urology;  Laterality: Right;    REMOVAL, STENT, URETER Right 7/7/2023    Procedure: REMOVAL, STENT, URETER;  Surgeon: Laz Rudolph Jr., MD;  Location: [facility] OR 1ST FLR;  Service: Urology;  Laterality: Right;    RETROGRADE PYELOGRAPHY Right 4/19/2023    Procedure: PYELOGRAM, RETROGRADE;  Surgeon: Domo Bates MD;  [facility];  Service: Urology;  Laterality: Right;    TOTAL ABDOMINAL HYSTERECTOMY  1969    TAHBSO    TOTAL KNEE ARTHROPLASTY  9/13/2012    Left    TRANSFORAMINAL EPIDURAL INJECTION OF STEROID Bilateral 9/17/2019    Procedure: INJECTION, STEROID, EPIDURAL, TRANSFORAMINAL APPROACH;  Surgeon: Oscar Ravi MD;  Location: [facility] PAIN MGT;  Service: Pain Management;  Laterality: Bilateral;  B/L TFESI L4/L5    TRANSFORAMINAL EPIDURAL INJECTION OF STEROID Bilateral 10/2/2020    Procedure: INJECTION, STEROID, EPIDURAL, TRANSFORAMINAL APPROACH;  Surgeon: Oscar Ravi MD;  Location: [facility] PAIN MGT;  Service: Pain Management;  Laterality: Bilateral;  B/L TFESI L4/5    TRANSFORAMINAL EPIDURAL INJECTION OF STEROID Bilateral 5/7/2021    Procedure: INJECTION, STEROID, EPIDURAL, TRANSFORAMINAL APPROACH, L4-L5 clear to hold Eliquis 3 days;  Surgeon: Oscar Ravi MD;  Location: [facility] PAIN MGT;  Service: Pain Management;  Laterality: Bilateral;    URETERAL STENT PLACEMENT Right 4/19/2023    Procedure: INSERTION, STENT, URETER;  Surgeon: Domo Bates MD;  [facility];  Service: Urology;  Laterality: Right;    URETERAL STENT PLACEMENT N/A 5/12/2023    Procedure: INSERTION, STENT, URETER;  Surgeon: Laz Rudolph Jr., MD;  [facility];  Service: Urology;   Laterality: N/A;    URETERAL STENT PLACEMENT Right 7/7/2023    Procedure: INSERTION, STENT, URETER;  Surgeon: Laz Rudolph Jr., MD;  Location: [facility] OR [address] [place] [address];  Service: Urology;  Laterality: Right;    URETEROSCOPY Right 5/12/2023    Procedure: URETEROSCOPY;  Surgeon: Laz Rudolph Jr., MD;  Location: [facility] OR [address] [place] [address];  Service: Urology;  Laterality: Right;    URETEROSCOPY Right 7/7/2023    Procedure: URETEROSCOPY;  Surgeon: aLz Rudolph Jr., MD;  Location: [facility] OR [address] [place] [address];  Service: Urology;  Laterality: Right;        Family History  Her family history includes Alcohol abuse in her brother; Breast cancer in her maternal aunt and sister; Cancer in her mother and sister; Cirrhosis in her brother; Dementia in her brother and brother; Diabetes in her brother, sister, sister, sister, sister, and another family member; Drug abuse in her brother and brother; Frontotemporal dementia in her brother; Heart attack in her father and sister; Heart disease in her father; Hypertension in her mother; Liver cancer in her brother; Lupus in her brother and sister; No Known Problems in her daughter, daughter, maternal grandfather, maternal grandmother, maternal uncle, paternal aunt, paternal grandfather, paternal grandmother, paternal uncle, son, son, and son; Ovarian cancer in an other family member; Sleep apnea in her sister.    Social History  She reports that she quit smoking about 24 years ago. Her smoking use included cigarettes. She started smoking about 39 years ago. She has a 15 pack-year smoking history. She has never used smokeless tobacco. She reports that she does not drink alcohol and does not use drugs.    Allergies  She is allergic to crestor [rosuvastatin], ezetimibe, hydrocodone, lisinopril, sulfa (sulfonamide antibiotics), sulfamethoxazole-trimethoprim, and valsartan.    Medications  She has a current medication list which includes the following prescription(s): accu-chek guide test strips, accu-chek soft dev  "lancets, albuterol, amlodipine, apixaban, atorvastatin, azelastine, benzonatate, carvedilol, cholecalciferol (vitamin d3), dropsafe alcohol prep pads, trulicity, duloxetine, fluticasone propionate, furosemide, hydroxyzine hcl, insulin aspart u-100, lantus solostar u-100 insulin, lancets, magnesium oxide, oxycodone-acetaminophen, oxycodone-acetaminophen, pantoprazole, pen needle, diabetic, ropinirole, triamcinolone acetonide 0.1%, blood-glucose meter, loratadine, and olopatadine.    ROS:  Pertinent positive and negative review of systems as noted in HPI.      Objective:     Ht 5' 8" (1.727 m)   Wt 93.4 kg (206 lb)   LMP  (LMP Unknown)   BMI 31.32 kg/m²    Constitutional: Well appearing, communicating. No acute distress  Voice: Euphonic  Eyes: Conjunctiva WNL, Pupils reactive  Head/Face:   HB III/VI on the left  Achieves full eye closure, synkinesis present    Right Ear:    Auricle normally developed   EAC: normal   Tympanic membrane: intact   Middle Ear: No effusion present and Ossicles in normal position  Left Ear:    Auricle normally developed   EAC: Mastoid bowl clean, no evidence of cholesteatoma   Neck   Left postauricular scar in continuity with left neck incisional scar   No masses on palation  Neuro/Psychiatric:     Affect: Appropriate   Cognition: Impaired   Eyes: EOMI intact  Respiratory: No increased WOB, no stridor       Data Review:   LABS    I have independently reviewed the lab results shown above. Findings significant for the conditions being treated include: none    IMAGING    No pertinent imaging available    AUDIO    not performed    Procedures:     Procedure: Debridement of Mastoid Bowl, left CPT 51864    Pre procedure Diagnosis: H/o Canal Wall Down Mastoidectomy    Post procedure Diagnosis: same    Instrument: Binocular Microscope    Anesthesia: None    Procedure: After verbal consent was obtained, the patient was laid supine in the small procedure room. A microscope was used to examine the " mastoid bowl. Instrumentation and suction were used to remove any cerumen, debris or blood from the mastoid bowl. The patient tolerated the procedure well and without any acute complication. Findings below.     Findings:    Left:  Mastoid bowl fully cleaned. No evidence of cholesteatoma. No other abnormalities    See documented assessment and plan for impression.       Assessment:     1. History of left mastoidectomy    2. Facial nerve paralysis    3. Sensorineural hearing loss (SNHL) of left ear with restricted hearing of right ear        Plan:     I had a long discussion with the patient regarding her condition and the further workup and management options.      Update audiogram. Follow-up after testing.     Voice recognition software was used in the creation of this note/communication and any sound-alike errors which may have occurred from its use should be taken in context when interpreting. If such errors prevent a clear understanding of the note/communication, please contact the office for clarification.   Problem List Items Addressed This Visit       Hearing loss, sensorineural     Other Visit Diagnoses       History of left mastoidectomy    -  Primary    Facial nerve paralysis

## 2025-02-17 ENCOUNTER — CLINICAL SUPPORT (OUTPATIENT)
Dept: AUDIOLOGY | Facility: CLINIC | Age: 85
End: 2025-02-17
Payer: MEDICARE

## 2025-02-17 DIAGNOSIS — H90.A22 SENSORINEURAL HEARING LOSS (SNHL) OF LEFT EAR WITH RESTRICTED HEARING OF RIGHT EAR: Primary | ICD-10-CM

## 2025-02-17 PROCEDURE — 92557 COMPREHENSIVE HEARING TEST: CPT | Mod: HCNC,S$GLB,, | Performed by: AUDIOLOGIST

## 2025-02-17 NOTE — PROGRESS NOTES
Henrietta Villasenor, a 84 y.o. female, was seen today in the clinic for an audiologic evaluation. Patient has a history of mastoidectomy in the left ear over 20 years ago. Patient reported she has difficutly hearing with her right ear as well. Patient sadie has a hearing aid, but did not have it with her at today's appointment.    Tympanometry revealed Type A in the right ear.    Audiogram results revealed mild sloping to severe sensorineural hearing loss in the right ear and profound sensorineural hearing loss in the left ear.  Speech reception thresholds were noted at 45 dB in the right ear and could not be obtained in the left ear due to severity of hearing loss.  Speech discrimination scores were 45% in the right ear and could not be tested in the left ear due to severity of hearing loss.    Patient is a candidate for a CROS system or a traditional hearing aid in the right ear.    Recommendations:  Otologic evaluation  Annual audiogram  Hearing protection when in noise  Hearing aid consultation

## 2025-02-17 NOTE — PROGRESS NOTES
Henrietta Villasenor, 84 year old woman, was seen for a hearing aid consult. Patient has [employer] benefit through her [employer] insurance that she would like to use. Patient was provided information for clinics through which she can obtain hearing aids with insurance benefit.

## 2025-02-24 ENCOUNTER — OFFICE VISIT (OUTPATIENT)
Dept: INTERNAL MEDICINE | Facility: CLINIC | Age: 85
End: 2025-02-24
Payer: MEDICARE

## 2025-02-24 VITALS
OXYGEN SATURATION: 98 % | SYSTOLIC BLOOD PRESSURE: 138 MMHG | BODY MASS INDEX: 31.34 KG/M2 | WEIGHT: 206.81 LBS | HEIGHT: 68 IN | HEART RATE: 99 BPM | DIASTOLIC BLOOD PRESSURE: 92 MMHG

## 2025-02-24 DIAGNOSIS — L29.9 ITCHING: ICD-10-CM

## 2025-02-24 DIAGNOSIS — Z86.718 HISTORY OF DVT (DEEP VEIN THROMBOSIS): ICD-10-CM

## 2025-02-24 DIAGNOSIS — E11.42 TYPE 2 DIABETES MELLITUS WITH DIABETIC POLYNEUROPATHY, WITH LONG-TERM CURRENT USE OF INSULIN: ICD-10-CM

## 2025-02-24 DIAGNOSIS — I87.2 VENOUS INSUFFICIENCY OF BOTH LOWER EXTREMITIES: ICD-10-CM

## 2025-02-24 DIAGNOSIS — R21 RASH: ICD-10-CM

## 2025-02-24 DIAGNOSIS — I10 ESSENTIAL HYPERTENSION: Primary | Chronic | ICD-10-CM

## 2025-02-24 DIAGNOSIS — Z79.4 TYPE 2 DIABETES MELLITUS WITH DIABETIC POLYNEUROPATHY, WITH LONG-TERM CURRENT USE OF INSULIN: ICD-10-CM

## 2025-02-24 PROCEDURE — 99999 PR PBB SHADOW E&M-EST. PATIENT-LVL V: CPT | Mod: PBBFAC,HCNC,, | Performed by: NURSE PRACTITIONER

## 2025-02-24 RX ORDER — CARVEDILOL 25 MG/1
25 TABLET ORAL 2 TIMES DAILY
Qty: 180 TABLET | Refills: 0 | Status: SHIPPED | OUTPATIENT
Start: 2025-02-24

## 2025-02-24 RX ORDER — HYDROXYZINE HYDROCHLORIDE 10 MG/1
10 TABLET, FILM COATED ORAL 3 TIMES DAILY PRN
Qty: 30 TABLET | Refills: 0 | Status: SHIPPED | OUTPATIENT
Start: 2025-02-24

## 2025-02-24 RX ORDER — AMLODIPINE BESYLATE 5 MG/1
5 TABLET ORAL DAILY
Qty: 90 TABLET | Refills: 0 | Status: SHIPPED | OUTPATIENT
Start: 2025-02-24 | End: 2026-02-24

## 2025-02-24 NOTE — PROGRESS NOTES
INTERNAL MEDICINE PROGRESS/URGENT CARE NOTE    CHIEF COMPLAINT     Chief Complaint   Patient presents with    swelling in both ankles     rash on back        HPI     Henrietta Vilalsenor is a 85 y.o. female who presents for an urgent visit today.    PCP: Dr. Ariraga    Pt with hx of CHF, DM, B leg edema, dvt, venous insufficiency, CKD3, CAD, PAD, HTN, chronic pain here for a few complaints.     Continues to have itching to her back. Also has a rash to behind both knees which is itchy. She took the hydroxyzine and triamcinolone cream. Hydroxyzine helped some.      Has intermittent ankle and feet swelling. Wearing her stockings today. No swelling. No sob or cp. No leg pain.     She brought her med bottles but didn't bring her insulin. Her eliquis, coreg, and amlodipine are not in the bag. States she's out.  She is taking her insulin through.     Problem List[1]     Past Medical History:  Past Medical History:   Diagnosis Date    Abnormality of lung 11/08/2011    Stable bandlike opacities at the lung bases, most likely representing      Anxiety     Arthritis     CAD (coronary artery disease)     Calculus of ureter 2/22/2011    CHF (congestive heart failure)     Chronic back pain 7/29/2012    Colon polyp 9/2010; 11/2010    Colon polyps 7/29/2012    Coronary artery disease involving native coronary artery of native heart with angina pectoris     Depression     Diabetes mellitus     Diabetes mellitus type II     Diverticulitis large intestine 7/27/2015    Diverticulitis of large intestine without perforation or abscess without bleeding     Diverticulosis 09/25/2010; 11/02/2010; 11/08/2011; 7/29/2012    Duodenal disorder 08/25/2011    Duodenal erosion noted on EGD.    Duodenal ulcer, unspecified as acute or chronic, without hemorrhage, perforation, or obstruction 8/24/2011    E. coli sepsis 12/2010    Due to left ureteral stone with left nephrostomy tube - hospitalized in [place]    Esophageal dysmotility 01/24/2012    Noted on  upper GI-barium swallow.    Facial weakness 1969    Left facial weakness s/p left mastoidectomy in ~ 1969.    Fatty liver 11/08/2011    Reported on CT-abdomen and in 06/2012 Gastro clinic visit note.    Gastric polyp 09/29/2010    GERD (gastroesophageal reflux disease) 7/29/2012    Hepatomegaly 11/08/2011    Reported on CT-abdomen    Herpes zoster with other nervous system complications(053.19) 2/28/2011    Hiatal hernia 06/26/2006; 09/29/2010; 08/25/2011    Noted on barium swallow 2006; noted on  EGD 2011.    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    Trouble in sleeping     Type II or unspecified type diabetes mellitus with neurological manifestations, not stated as uncontrolled(250.60)     Type II or unspecified type diabetes mellitus with peripheral circulatory disorders, not stated as uncontrolled(250.70)     Type II or unspecified type diabetes mellitus with renal manifestations, not stated as uncontrolled(250.40)         Past Surgical History:  Past Surgical History:   Procedure Laterality Date    BELPHAROPTOSIS REPAIR      s/p LAMBERTO levator repair - Dr. Dejesus    CARDIAC CATHETERIZATION      CARPAL TUNNEL RELEASE  3/13/2012    CATARACT EXTRACTION W/  INTRAOCULAR LENS IMPLANT Right  10/31/2018        CATARACT EXTRACTION W/  INTRAOCULAR LENS IMPLANT Left 03/22/2018        CHOLECYSTECTOMY      COLONOSCOPY N/A 11/16/2016    Procedure: COLONOSCOPY;  Surgeon: Chris Storey MD;  Location: [facility] (4TH FLR);  Service: Endoscopy;  Laterality: N/A;    COLONOSCOPY N/A 6/14/2017    Procedure: COLONOSCOPY;  Surgeon: Chris Storey MD;  Location: [facility] ENDO (4TH FLR);  Service: Endoscopy;  Laterality: N/A;  colonoscopy in 3 months with better bowel prep. - Split PEG prep ordered    CYSTOSCOPY  4/19/2023    Procedure: CYSTOSCOPY;  Surgeon: Domo Bates MD;  Location: [facility] OR [address] [place] [address];  Service: Urology;;    CYSTOSCOPY N/A 5/12/2023    Procedure: CYSTOSCOPY;  Surgeon: Laz Rudolph Jr., MD;  Location: [facility] OR [address] [place] [address];  Service: Urology;  Laterality: N/A;    CYSTOSCOPY N/A 7/7/2023    Procedure: CYSTOSCOPY;  Surgeon: Laz Rudolph Jr., MD;  Location: [facility] OR [address] [place] [address];  Service: Urology;  Laterality: N/A;    ESOPHAGOGASTRODUODENOSCOPY N/A 11/10/2021    Procedure: EGD (ESOPHAGOGASTRODUODENOSCOPY);  Surgeon: Kuldeep Velázquez MD;  [facility]  Service: Endoscopy;  Laterality: N/A;  11/4-eliquis hold ok see te -tb-fully vacc-inst mail and verbal-    EXTRACORPOREAL SHOCK WAVE LITHOTRIPSY      EXTRACTION - STONE Right 5/12/2023    Procedure: EXTRACTION - STONE;  Surgeon: Laz Rudolph Jr., MD;  Location: [facility] OR [address] [place] [address];  Service: Urology;  Laterality: Right;    INJECTION OF ANESTHETIC AGENT AROUND NERVE Bilateral 6/24/2020    Procedure: BLOCK, NERVE, C4-C5-C6 MEDIAL BRANCH ok per Talia to add on;  Surgeon: Oscar Ravi MD;  Location: [facility] PAIN MGT;  Service: Pain Management;  Laterality: Bilateral;    INTRAOCULAR PROSTHESES INSERTION Right 10/31/2018    Procedure: INSERTION-INTRAOCULAR LENS (IOL);  Surgeon: Keysha Valle MD;  Location: [facility] OR;  Service: Ophthalmology;  Laterality: Right;    JOINT REPLACEMENT      LASER LITHOTRIPSY Right 5/12/2023     Procedure: LITHOTRIPSY, USING LASER;  Surgeon: Laz Rudolph Jr., MD;  [facility];  Service: Urology;  Laterality: Right;    LASER LITHOTRIPSY Right 7/7/2023    Procedure: LITHOTRIPSY, USING LASER;  Surgeon: Laz Rudolph Jr., MD;  [facility];  Service: Urology;  Laterality: Right;    mastoid tumor removal  1969    Left mastoidectomy with residual left facial weakness.    NEPHROSTOMY      OOPHORECTOMY      PHACOEMULSIFICATION OF CATARACT Right 10/31/2018    Procedure: PHACOEMULSIFICATION-ASPIRATION-CATARACT;  Surgeon: Keysha Valle MD;  Location: [facility] OR;  Service: Ophthalmology;  Laterality: Right;    PYELOSCOPY Right 5/12/2023    Procedure: PYELOSCOPY;  Surgeon: Laz Rudolph Jr., MD;  [facility];  Service: Urology;  Laterality: Right;    PYELOSCOPY Right 7/7/2023    Procedure: PYELOSCOPY;  Surgeon: Laz Rudolph Jr., MD;  [facility];  Service: Urology;  Laterality: Right;    REMOVAL, STENT, URETER Right 5/12/2023    Procedure: REMOVAL, STENT, URETER;  Surgeon: Laz Rudolph Jr., MD;  [facility];  Service: Urology;  Laterality: Right;    REMOVAL, STENT, URETER Right 7/7/2023    Procedure: REMOVAL, STENT, URETER;  Surgeon: Laz Rudolph Jr., MD;  [facility];  Service: Urology;  Laterality: Right;    RETROGRADE PYELOGRAPHY Right 4/19/2023    Procedure: PYELOGRAM, RETROGRADE;  Surgeon: Domo Bates MD;  [facility];  Service: Urology;  Laterality: Right;    TOTAL ABDOMINAL HYSTERECTOMY  1969    TAHBSO    TOTAL KNEE ARTHROPLASTY  9/13/2012    Left    TRANSFORAMINAL EPIDURAL INJECTION OF STEROID Bilateral 9/17/2019    Procedure: INJECTION, STEROID, EPIDURAL, TRANSFORAMINAL APPROACH;  Surgeon: Oscar Ravi MD;  Location: [facility] PAIN MGT;  Service: Pain Management;  Laterality: Bilateral;  B/L TFESI L4/L5    TRANSFORAMINAL EPIDURAL INJECTION OF STEROID Bilateral 10/2/2020    Procedure:  INJECTION, STEROID, EPIDURAL, TRANSFORAMINAL APPROACH;  Surgeon: Oscar Ravi MD;  Location: [facility] PAIN MGT;  Service: Pain Management;  Laterality: Bilateral;  B/L TFESI L4/5    TRANSFORAMINAL EPIDURAL INJECTION OF STEROID Bilateral 5/7/2021    Procedure: INJECTION, STEROID, EPIDURAL, TRANSFORAMINAL APPROACH, L4-L5 clear to hold Eliquis 3 days;  Surgeon: Oscar Ravi MD;  Location: [facility] PAIN MGT;  Service: Pain Management;  Laterality: Bilateral;    URETERAL STENT PLACEMENT Right 4/19/2023    Procedure: INSERTION, STENT, URETER;  Surgeon: Domo Bates MD;  Location: [facility] OR 1ST FLR;  Service: Urology;  Laterality: Right;    URETERAL STENT PLACEMENT N/A 5/12/2023    Procedure: INSERTION, STENT, URETER;  Surgeon: Laz Rudolph Jr., MD;  Location: [facility] OR [facility] FLR;  Service: Urology;  Laterality: N/A;    URETERAL STENT PLACEMENT Right 7/7/2023    Procedure: INSERTION, STENT, URETER;  Surgeon: Laz Rudolph Jr., MD;  Location: [facility] OR 1ST FLR;  Service: Urology;  Laterality: Right;    URETEROSCOPY Right 5/12/2023    Procedure: URETEROSCOPY;  Surgeon: Laz Rudolph Jr., MD;  Location: [facility] OR [facility] FLR;  Service: Urology;  Laterality: Right;    URETEROSCOPY Right 7/7/2023    Procedure: URETEROSCOPY;  Surgeon: Laz Rudolph Jr., MD;  Location: [facility] OR [facility] FLR;  Service: Urology;  Laterality: Right;        Allergies:  Review of patient's allergies indicates:   Allergen Reactions    Crestor [rosuvastatin]      Cramping in legs    Ezetimibe Diarrhea     Other reaction(s): abdominal pain, Diarrhea      Hydrocodone Itching    Lisinopril      Other reaction(s): cough      Sulfa (sulfonamide antibiotics) Itching and Rash           Sulfamethoxazole-trimethoprim     Valsartan Swelling       Home Medications:  Current Medications[2]     Review of Systems:  Review of Systems   Constitutional:  Negative for fever.   HENT:  Negative for congestion and sore throat.    Respiratory:  Negative for cough and shortness of  "breath.    Cardiovascular:  Negative for chest pain.   Gastrointestinal:  Negative for diarrhea and vomiting.   Skin:  Positive for rash.   Neurological:  Negative for dizziness and headaches.         PHYSICAL EXAM     Vitals:    02/24/25 1347 02/24/25 1404   BP: (!) 146/94 (!) 138/92   BP Location: Left arm    Patient Position: Sitting    Pulse: 99    SpO2: 98%    Weight: 93.8 kg (206 lb 12.7 oz)    Height: 5' 8" (1.727 m)       Body mass index is 31.44 kg/m².     Physical Exam  Vitals reviewed.   Constitutional:       Appearance: Normal appearance.   HENT:      Head: Normocephalic.      Mouth/Throat:      Mouth: Mucous membranes are moist.      Pharynx: Oropharynx is clear.   Eyes:      Conjunctiva/sclera: Conjunctivae normal.   Cardiovascular:      Rate and Rhythm: Normal rate and regular rhythm.      Heart sounds: Normal heart sounds.   Pulmonary:      Effort: Pulmonary effort is normal.      Breath sounds: Normal breath sounds.   Musculoskeletal:      Right lower leg: No edema.      Left lower leg: No edema.      Comments: Hyperpigmented rash noted behind bilat knees. No papules or vesicles.    Skin:     General: Skin is warm and dry.   Neurological:      Mental Status: She is alert and oriented to person, place, and time.   Psychiatric:         Mood and Affect: Mood normal.         Behavior: Behavior normal.         LABS     Lab Results   Component Value Date    HGBA1C 8.4 (H) 11/05/2024     CMP  Sodium   Date Value Ref Range Status   01/19/2025 142 136 - 145 mmol/L Final     Potassium   Date Value Ref Range Status   01/19/2025 4.2 3.5 - 5.1 mmol/L Final     Chloride   Date Value Ref Range Status   01/19/2025 103 95 - 110 mmol/L Final     CO2   Date Value Ref Range Status   01/19/2025 24 23 - 29 mmol/L Final     Glucose   Date Value Ref Range Status   01/19/2025 154 (H) 70 - 110 mg/dL Final     BUN   Date Value Ref Range Status   01/19/2025 16 8 - 23 mg/dL Final     Creatinine   Date Value Ref Range Status "   01/19/2025 1.2 0.5 - 1.4 mg/dL Final     Calcium   Date Value Ref Range Status   01/19/2025 10.4 8.7 - 10.5 mg/dL Final     Total Protein   Date Value Ref Range Status   01/19/2025 8.2 6.0 - 8.4 g/dL Final     Albumin   Date Value Ref Range Status   01/19/2025 3.5 3.5 - 5.2 g/dL Final     Total Bilirubin   Date Value Ref Range Status   01/19/2025 0.4 0.1 - 1.0 mg/dL Final     Comment:     For infants and newborns, interpretation of results should be based  on gestational age, weight and in agreement with clinical  observations.    Premature Infant recommended reference ranges:  Up to 24 hours.............<8.0 mg/dL  Up to 48 hours............<12.0 mg/dL  3-5 days..................<15.0 mg/dL  6-29 days.................<15.0 mg/dL       Alkaline Phosphatase   Date Value Ref Range Status   01/19/2025 93 40 - 150 U/L Final     AST   Date Value Ref Range Status   01/19/2025 22 10 - 40 U/L Final     ALT   Date Value Ref Range Status   01/19/2025 14 10 - 44 U/L Final     Anion Gap   Date Value Ref Range Status   01/19/2025 15 8 - 16 mmol/L Final     eGFR if    Date Value Ref Range Status   07/07/2022 48.6 (A) >60 mL/min/1.73 m^2 Final     eGFR if non    Date Value Ref Range Status   07/07/2022 42.2 (A) >60 mL/min/1.73 m^2 Final     Comment:     Calculation used to obtain the estimated glomerular filtration  rate (eGFR) is the CKD-EPI equation.        Lab Results   Component Value Date    WBC 11.23 01/19/2025    HGB 12.7 01/19/2025    HCT 40.2 01/19/2025    MCV 86 01/19/2025     01/19/2025     Lab Results   Component Value Date    CHOL 197 09/26/2024    CHOL 156 09/08/2023    CHOL 169 05/19/2021     Lab Results   Component Value Date    HDL 41 09/26/2024    HDL 40 09/08/2023    HDL 45 05/19/2021     Lab Results   Component Value Date    LDLCALC 134.4 09/26/2024    LDLCALC 98.6 09/08/2023    LDLCALC 107.6 05/19/2021     Lab Results   Component Value Date    TRIG 108 09/26/2024     TRIG 87 09/08/2023    TRIG 82 05/19/2021     Lab Results   Component Value Date    CHOLHDL 20.8 09/26/2024    CHOLHDL 25.6 09/08/2023    CHOLHDL 26.6 05/19/2021     Lab Results   Component Value Date    TSH 1.084 08/16/2024       ASSESSMENT     1. Essential hypertension    2. Venous insufficiency of both lower extremities    3. Itching    4. History of DVT (deep vein thrombosis)    5. Type 2 diabetes mellitus with diabetic polyneuropathy, with long-term current use of insulin    6. Rash           PLAN  1. Essential hypertension  Refilled her meds. Will have her f/u with Dr. Arriaga in 2 weeks. Due for f/u.   -     carvediloL (COREG) 25 MG tablet; Take 1 tablet (25 mg total) by mouth 2 (two) times daily.  Dispense: 180 tablet; Refill: 0  -     amLODIPine (NORVASC) 5 MG tablet; Take 1 tablet (5 mg total) by mouth once daily.  Dispense: 90 tablet; Refill: 0    2. Venous insufficiency of both lower extremities  Continue low salt diet, compression socks, leg elevation, and lasix 3 x a week.     3. Itching  -     hydrOXYzine HCL (ATARAX) 10 MG Tab; Take 1 tablet (10 mg total) by mouth 3 (three) times daily as needed (itching).  Dispense: 30 tablet; Refill: 0    4. History of DVT (deep vein thrombosis)  Refilled eliquis.   -     apixaban (ELIQUIS) 5 mg Tab; Take 1 tablet (5 mg total) by mouth 2 (two) times daily.  Dispense: 60 tablet; Refill: 0    5. Type 2 diabetes mellitus with diabetic polyneuropathy, with long-term current use of insulin  Last A1c 8.4. pt denies any lows. Continue current regimen and she will f/u with pcp      6. Rash  -     Ambulatory referral/consult to Dermatology; Future; Expected date: 03/03/2025            Follow up with PCP     Patient was counseled on when to seek emergent care. Patient's plan/treatment was discussed including medications and possible side effects. Verbalized understanding of all instructions.     This note was partly generated with [employer] voice recognition software. I  apologize for any possible typographical errors.          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    Neuralgia, neuritis, and radiculitis, unspecified    Abdominal pain, unspecified site    Ureteral calculus    Dermatitis due to drugs and medicines taken internally(693.0)    Dermatochalasis    Personal history of allergy to sulfonamides    Chronic diastolic heart failure    Knee pain    Anemia    Chronic right ear pain    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    Hearing loss, sensorineural    Stage 3a chronic kidney disease    Coronary artery disease involving native coronary artery of native heart with angina pectoris    Lactose intolerance    Allergic rhinitis    Hyperlipidemia    Aortic atherosclerosis    Spinal enthesopathy of lumbar region    LLQ abdominal pain    Diverticulitis of large intestine without perforation or abscess without bleeding    Esophageal dysphagia    Fatty liver    Painful swallowing    Left facial numbness    Neck pain on left side    Type 2 diabetes mellitus, with long-term current use of insulin    Shortness of breath    Diabetic neuropathy associated with type 2 diabetes mellitus    Type 2 diabetes mellitus with stage 3 chronic kidney disease, with long-term current use of insulin    Obesity (BMI 30.0-34.9)    Low hemoglobin and low hematocrit    History of colon polyps    Type 2 diabetes mellitus with diabetic polyneuropathy, with long-term current use of insulin    Obesity, diabetes, and hypertension syndrome    Spinal stenosis of lumbar region with neurogenic claudication    Benign paroxysmal positional vertigo due to  bilateral vestibular disorder    Tortuous aorta    Ectatic aorta    Insulin long-term use    Facet arthropathy    Mood disorder    Type 2 diabetes mellitus with hyperglycemia    Hypovitaminosis D    Research study patient - TACT2 EDTA Chelation Study    History of MI (myocardial infarction)    Facial nerve palsy, secondary    Gout, arthritis    Vitreomacular adhesion, right    Epiretinal membrane, left    Chronic pain    Decreased ROM of lumbar spine    Weakness    Abnormality of gait and mobility    Decreased functional activity tolerance    Chest pain    Postmenopausal    Osteopenia    Thyroid nodule    Bilateral leg edema    Neuropathic pain    Hematoma of arm, left, initial encounter    Deep vein thrombosis (DVT) of popliteal vein of both lower extremities    PAD (peripheral artery disease)    Memory loss    Venous insufficiency of both lower extremities    Chronic pain disorder    Decreased activities of daily living (ADL)    Impaired functional mobility, balance, gait, and endurance    Left leg pain    Chronic bilateral low back pain with sciatica    At risk for falls    Hydronephrosis with urinary obstruction due to ureteral calculus    History of DVT (deep vein thrombosis)    Hypoglycemia associated with diabetes    Hypomagnesemia    Hypokalemia    Stage 3b chronic kidney disease   [2]   Current Outpatient Medications:     ACCU-CHEK GUIDE TEST STRIPS Strp, TEST BLOOD SUGAR THREE TIMES DAILY, Disp: 300 strip, Rfl: 3    ACCU-CHEK SOFT DEV LANCETS Kit, , Disp: , Rfl:     albuterol (PROVENTIL/VENTOLIN HFA) 90 mcg/actuation inhaler, Inhale 2 puffs by mouth into the lungs every 4 (four) hours as needed for Wheezing. Rescue, Disp: 25.5 g, Rfl: 3    atorvastatin (LIPITOR) 40 MG tablet, Take 1 tablet (40 mg total) by mouth once daily., Disp: 90 tablet, Rfl: 3    azelastine (ASTELIN) 137 mcg (0.1 %) nasal spray, 2 sprays (274 mcg total) by Nasal route 2 (two) times daily as needed for Rhinitis., Disp: 30 mL, Rfl: 0     "benzonatate (TESSALON) 100 MG capsule, Take 1 capsule (100 mg total) by mouth 3 (three) times daily as needed for Cough., Disp: 30 capsule, Rfl: 0    cholecalciferol, vitamin D3, 125 mcg (5,000 unit) Tab, Take 5,000 Units by mouth once daily., Disp: , Rfl:     DROPSAFE ALCOHOL PREP PADS PadM, USE TOPICALLY TO TEST BLOOD SUGAR FOUR TIMES DAILY, Disp: 400 each, Rfl: 3    dulaglutide (TRULICITY) 0.75 mg/0.5 mL pen injector, Inject 0.75 mg into the skin every 7 days., Disp: 4 pen , Rfl: 2    DULoxetine (CYMBALTA) 60 MG capsule, Take 1 capsule (60 mg total) by mouth 2 (two) times daily., Disp: 180 capsule, Rfl: 3    fluticasone propionate (FLONASE) 50 mcg/actuation nasal spray, Instill 2 sprays in each nostril once daily., Disp: 16 g, Rfl: 1    furosemide (LASIX) 20 MG tablet, Take 1 tablet (20 mg total) by mouth every morning. Take 1 tablet by mouth on Monday, Wednesday, and Friday, Disp: 90 tablet, Rfl: 3    insulin aspart U-100 (NOVOLOG FLEXPEN U-100 INSULIN) 100 unit/mL (3 mL) InPn pen, Inject 10 units before meals, hold if sugar is less than 100. Max daily 45 units., Disp: 9 mL, Rfl: 3    insulin glargine U-100, Lantus, (LANTUS SOLOSTAR U-100 INSULIN) 100 unit/mL (3 mL) InPn pen, Inject 20 Units into the skin once daily., Disp: , Rfl:     lancets (ACCU-CHEK SOFTCLIX LANCETS) Misc, TEST BLOOD SUGAR FOUR TIMES DAILY, Disp: 400 each, Rfl: 3    magnesium oxide (MAG-OX) 400 mg (241.3 mg magnesium) tablet, Take 1 tablet (400 mg total) by mouth once daily., Disp: 100 tablet, Rfl: 4    oxyCODONE-acetaminophen (PERCOCET)  mg per tablet, Take one tablet by mouth 5 times daily, Disp: 150 tablet, Rfl: 0    oxyCODONE-acetaminophen (PERCOCET)  mg per tablet, Take 1 tablet by mouth 5 (five) times daily., Disp: 150 tablet, Rfl: 0    pantoprazole (PROTONIX) 40 MG tablet, Take 1 tablet (40 mg total) by mouth once daily., Disp: 90 tablet, Rfl: 1    pen needle, diabetic (BD ULTRA-FINE OLU PEN NEEDLE) 32 gauge x 5/32" Ndle, " Uses 4 times daily, on multiple daily insulin injections, Disp: 130 each, Rfl: 12    rOPINIRole (REQUIP) 0.5 MG tablet, Take 1 tablet (0.5 mg total) by mouth every evening., Disp: 90 tablet, Rfl: 3    triamcinolone acetonide 0.1% (KENALOG) 0.1 % ointment, Apply topically 2 (two) times daily. Apply to rash on back, Disp: 30 g, Rfl: 1    amLODIPine (NORVASC) 5 MG tablet, Take 1 tablet (5 mg total) by mouth once daily., Disp: 90 tablet, Rfl: 0    apixaban (ELIQUIS) 5 mg Tab, Take 1 tablet (5 mg total) by mouth 2 (two) times daily., Disp: 60 tablet, Rfl: 0    blood-glucose meter kit, Use as instructed, Disp: 1 each, Rfl: 0    carvediloL (COREG) 25 MG tablet, Take 1 tablet (25 mg total) by mouth 2 (two) times daily., Disp: 180 tablet, Rfl: 0    hydrOXYzine HCL (ATARAX) 10 MG Tab, Take 1 tablet (10 mg total) by mouth 3 (three) times daily as needed (itching)., Disp: 30 tablet, Rfl: 0    loratadine (CLARITIN) 10 mg tablet, Take 1 tablet (10 mg total) by mouth daily as needed for Allergies., Disp: 30 tablet, Rfl: 1    olopatadine (PATADAY) 0.2 % Drop, Place 1 drop into both eyes once daily. (Patient not taking: Reported on 6/14/2024), Disp: 2.5 mL, Rfl: 0

## 2025-02-25 DIAGNOSIS — G89.4 CHRONIC PAIN SYNDROME: ICD-10-CM

## 2025-02-25 RX ORDER — OXYCODONE AND ACETAMINOPHEN 10; 325 MG/1; MG/1
1 TABLET ORAL
Qty: 150 TABLET | Refills: 0 | Status: SHIPPED | OUTPATIENT
Start: 2025-02-25

## 2025-02-25 NOTE — TELEPHONE ENCOUNTER
No care due was identified.  [facility] Embedded Care Due Messages. Reference number: [id number].   2/25/2025 11:41:13 AM CST

## 2025-03-10 ENCOUNTER — TELEPHONE (OUTPATIENT)
Dept: AUDIOLOGY | Facility: CLINIC | Age: 85
End: 2025-03-10
Payer: MEDICARE

## 2025-03-10 ENCOUNTER — OFFICE VISIT (OUTPATIENT)
Dept: OTOLARYNGOLOGY | Facility: CLINIC | Age: 85
End: 2025-03-10
Payer: MEDICARE

## 2025-03-10 VITALS
BODY MASS INDEX: 31.74 KG/M2 | HEART RATE: 73 BPM | WEIGHT: 209.44 LBS | HEIGHT: 68 IN | DIASTOLIC BLOOD PRESSURE: 65 MMHG | SYSTOLIC BLOOD PRESSURE: 106 MMHG

## 2025-03-10 DIAGNOSIS — Z90.89 HISTORY OF LEFT MASTOIDECTOMY: ICD-10-CM

## 2025-03-10 DIAGNOSIS — H91.8X3 ASYMMETRICAL HEARING LOSS: Primary | ICD-10-CM

## 2025-03-10 PROCEDURE — 3074F SYST BP LT 130 MM HG: CPT | Mod: CPTII,S$GLB,, | Performed by: STUDENT IN AN ORGANIZED HEALTH CARE EDUCATION/TRAINING PROGRAM

## 2025-03-10 PROCEDURE — 1159F MED LIST DOCD IN RCRD: CPT | Mod: CPTII,S$GLB,, | Performed by: STUDENT IN AN ORGANIZED HEALTH CARE EDUCATION/TRAINING PROGRAM

## 2025-03-10 PROCEDURE — 99214 OFFICE O/P EST MOD 30 MIN: CPT | Mod: S$GLB,,, | Performed by: STUDENT IN AN ORGANIZED HEALTH CARE EDUCATION/TRAINING PROGRAM

## 2025-03-10 PROCEDURE — 1126F AMNT PAIN NOTED NONE PRSNT: CPT | Mod: CPTII,S$GLB,, | Performed by: STUDENT IN AN ORGANIZED HEALTH CARE EDUCATION/TRAINING PROGRAM

## 2025-03-10 PROCEDURE — 1160F RVW MEDS BY RX/DR IN RCRD: CPT | Mod: CPTII,S$GLB,, | Performed by: STUDENT IN AN ORGANIZED HEALTH CARE EDUCATION/TRAINING PROGRAM

## 2025-03-10 PROCEDURE — 3072F LOW RISK FOR RETINOPATHY: CPT | Mod: CPTII,S$GLB,, | Performed by: STUDENT IN AN ORGANIZED HEALTH CARE EDUCATION/TRAINING PROGRAM

## 2025-03-10 PROCEDURE — 3078F DIAST BP <80 MM HG: CPT | Mod: CPTII,S$GLB,, | Performed by: STUDENT IN AN ORGANIZED HEALTH CARE EDUCATION/TRAINING PROGRAM

## 2025-03-10 NOTE — TELEPHONE ENCOUNTER
----- Message from BRENNEN Fuchs sent at 3/10/2025  2:46 PM CDT -----  Good afternoon,Can you please reach out to this patient for scheduling for hearing aid consult?  She prefers afternoon appointments. Thank you!Sincerely,BRENNEN Fuchs

## 2025-03-10 NOTE — PROGRESS NOTES
Ear, Nose, & Throat  Otolaryngology - Head & Neck Surgery      Subjective:     Chief Complaint:   Chief Complaint   Patient presents with    Cerumen Impaction     03/10/2025: Returns to followup audiogram. No residual hearing on the left. Mild to severe HL on the right.     HPI:  Henrietta Villasenor is a 85 y.o. female who was self-referred for chronic, progressive hearing loss.     She has a remote history of left-sided ear surgery a [facility] many decades ago.  Scars consistent with a left mastoidectomy and parotidectomy.  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  She has cognitive impairment with memory loss.  History difficult to obtain.  She does wear hearing aid in the right.  She does not believe that her hearing aids are working well.  Thinks that they have not been adjusted many years.  No other otologic complaints.    Previously saw Dr Zhang in 2014. Profound deafness AS at that time.     Can achieve full eye closure.  Denies any issues with oral competence.  Last saw Optometry April 20, 2024.    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    Neuralgia, neuritis, and radiculitis, unspecified 11/30/2010    Abdominal pain, unspecified site 07/26/2011    Ureteral calculus 02/22/2011    Dermatitis due to drugs and medicines taken internally(693.0) 05/25/2011    Dermatochalasis 10/06/2010    Personal history of allergy to sulfonamides 09/20/2011    Chronic diastolic heart failure 09/12/2012    Knee pain 04/09/2013    Anemia 11/07/2013    Chronic right ear pain 03/07/2014    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  03/07/2014    Hearing loss, sensorineural 03/20/2014    Stage 3a chronic kidney disease 05/14/2014    Coronary artery disease involving native coronary artery of native heart with angina pectoris 05/14/2014    Lactose intolerance 06/03/2014    Allergic rhinitis 05/05/2015    Hyperlipidemia 06/19/2015    Aortic atherosclerosis 06/19/2015    Spinal enthesopathy of lumbar region 06/19/2015    LLQ abdominal pain 07/24/2015    Diverticulitis of large intestine without perforation or abscess without bleeding 07/27/2015    Esophageal dysphagia 09/15/2015    Fatty liver 09/15/2015    Painful swallowing 02/17/2016    Left facial numbness     Neck pain on left side     Type 2 diabetes mellitus, with long-term current use of insulin 05/11/2016    Shortness of breath     Diabetic neuropathy associated with type 2 diabetes mellitus 08/26/2016    Type 2 diabetes mellitus with stage 3 chronic kidney disease, with long-term current use of insulin 08/31/2016    Obesity (BMI 30.0-34.9) 08/31/2016    Low hemoglobin and low hematocrit 11/16/2016    History of colon polyps 06/14/2017    Type 2 diabetes mellitus with diabetic polyneuropathy, with long-term current use of insulin 08/20/2017    Obesity, diabetes, and hypertension syndrome 08/20/2017    Spinal stenosis of lumbar region with neurogenic claudication 02/23/2018    Benign paroxysmal positional vertigo due to bilateral vestibular disorder 05/18/2018    Tortuous aorta 06/05/2018    Ectatic aorta 06/05/2018    Insulin long-term use 06/05/2018    Facet arthropathy 06/05/2018    Mood disorder 08/15/2018    Type 2 diabetes mellitus with hyperglycemia 11/29/2018    Hypovitaminosis D 11/29/2018    Research study patient - TACT2 EDTA Chelation Study 11/29/2018    History of MI (myocardial infarction) 11/29/2018    Facial nerve palsy, secondary 11/29/2018    Gout, arthritis 05/10/2019    Vitreomacular adhesion, right 06/04/2019    Epiretinal membrane, left 06/04/2019    Chronic pain  09/17/2019    Decreased ROM of lumbar spine 12/10/2019    Weakness 12/10/2019    Abnormality of gait and mobility 12/10/2019    Decreased functional activity tolerance 12/10/2019    Chest pain 01/09/2020    Postmenopausal 07/02/2020    Osteopenia 07/07/2020    Thyroid nodule 07/16/2020    Bilateral leg edema 12/05/2020    Neuropathic pain 12/05/2020    Hematoma of arm, left, initial encounter 12/29/2020    Deep vein thrombosis (DVT) of popliteal vein of both lower extremities 02/04/2021    PAD (peripheral artery disease) 02/04/2021    Memory loss 02/10/2021    Venous insufficiency of both lower extremities 04/06/2021    Chronic pain disorder 12/07/2021    Decreased activities of daily living (ADL) 12/07/2021    Impaired functional mobility, balance, gait, and endurance 12/07/2021    Left leg pain 04/07/2022    Chronic bilateral low back pain with sciatica 04/07/2022    At risk for falls 12/27/2022    Hydronephrosis with urinary obstruction due to ureteral calculus 04/19/2023    History of DVT (deep vein thrombosis) 04/26/2023    Hypoglycemia associated with diabetes 04/26/2023    Hypomagnesemia 04/26/2023    Hypokalemia 04/26/2023    Stage 3b chronic kidney disease 06/14/2024     Resolved Ambulatory Problems     Diagnosis Date Noted    Obesity, Class I, BMI 30-34.9     Colon polyps 07/29/2012    E. coli sepsis 07/29/2012    Type 2 diabetes mellitus with diabetic polyneuropathy 07/31/2012    Peripheral neuropathy 07/31/2012    Urinary tract infection, site not specified 05/24/2011    Unspecified mastoiditis 03/30/2011    Arthropathy, unspecified, site unspecified 02/28/2011    Chronic kidney disease, unspecified 10/06/2010    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 01/10/2011    Coronary atherosclerosis of native coronary artery 02/28/2011    Diverticulosis of large intestine 11/01/2010    Duodenal ulcer, unspecified as acute or chronic, without hemorrhage, perforation, or obstruction  08/24/2011    Edema 11/08/2010    Esophageal reflux 02/28/2011    Localized osteoarthrosis not specified whether primary or secondary, lower leg 12/12/2011    Migraine, unspecified, without mention of intractable migraine without mention of status migrainosus 02/28/2011    Nonspecific abnormal results of other specified function study 09/20/2011    Osteoarthrosis, unspecified whether generalized or localized, lower leg 01/02/2012    Other dyspnea and respiratory abnormality 02/28/2011    Herpes zoster with other nervous system complications(053.19) 02/28/2011    Community acquired pneumonia 05/04/2012    Screening for glaucoma 09/05/2011    Other specified prophylactic or treatment measure 10/01/2012    Long term (current) use of anticoagulants 10/01/2012    S/P knee replacement 10/02/2012    S/P TKR (total knee replacement) 12/26/2012    Sepsis(995.91) 06/12/2013    Cough with sputum 06/13/2013    Right ear impacted cerumen 03/07/2014    Diabetes 08/06/2014    Back pain 02/23/2015    Hypertension associated with diabetes 06/19/2015    Major depressive disorder, single episode, mild 06/19/2015    Mesenteric artery stenosis 09/15/2015    Obesity, Class II, BMI 35-39.9, with comorbidity 09/15/2015    Type 2 diabetes mellitus with complication 10/02/2015    Tortuous aorta 03/28/2016    Ectatic aorta 03/28/2016    Atypical chest pain 04/06/2016    Decreased strength of trunk and back 10/25/2016    Hyperparathyroidism, unspecified     Senile nuclear sclerosis 03/22/2018    Hypercholesterolemia 11/29/2018    Lumbar spondylosis 01/31/2019    Myocardial infarction     Alteration in performance of activities of daily living 12/07/2021    Impaired functional mobility and activity tolerance 12/07/2021    HANNAH (acute kidney injury) 04/19/2023     Past Medical History:   Diagnosis Date    Abnormality of lung 11/08/2011    Anxiety     Arthritis     CAD (coronary artery disease)     Calculus of ureter 2/22/2011    CHF (congestive  heart failure)     Colon polyp 9/2010; 11/2010    Depression     Diabetes mellitus     Diabetes mellitus type II     Diverticulitis large intestine 7/27/2015    Diverticulosis 09/25/2010; 11/02/2010; 11/08/2011; 7/29/2012    Duodenal disorder 08/25/2011    Duodenal ulcer, unspecified as acute or chronic, without hemorrhage, perforation, or obstruction 8/24/2011    Esophageal dysmotility 01/24/2012    Facial weakness 1969    Gastric polyp 09/29/2010    GERD (gastroesophageal reflux disease) 7/29/2012    Hepatomegaly 11/08/2011    Hiatal hernia 06/26/2006; 09/29/2010; 08/25/2011    HTN (hypertension)     Migraine, unspecified, without mention of intractable migraine without mention of status migrainosus 2/28/2011    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    Obesity     MARLON (obstructive sleep apnea)     Pain     Pneumonia     Polyneuropathy     Postherpetic neuralgia     Recurrent nephrolithiasis     Sensorineural hearing loss of both ears     Thyroid disease 11/08/2011    Trouble in sleeping     Type II or unspecified type diabetes mellitus with neurological manifestations, not stated as uncontrolled(250.60)     Type II or unspecified type diabetes mellitus with peripheral circulatory disorders, not stated as uncontrolled(250.70)     Type II or unspecified type diabetes mellitus with renal manifestations, not stated as uncontrolled(250.40)        Past Surgical History  She has a past surgical history that includes Cardiac catheterization; Extracorporeal shock wave lithotripsy; mastoid tumor removal (1969); Nephrostomy; Carpal tunnel release (3/13/2012); Blepharoptosis repair; Total knee arthroplasty (9/13/2012); Cholecystectomy; Colonoscopy (N/A, 11/16/2016); Colonoscopy (N/A, 6/14/2017); Oophorectomy; Joint replacement; Phacoemulsification of cataract (Right, 10/31/2018); Intraocular prosthesis insertion (Right, 10/31/2018); Total abdominal hysterectomy (1969); Transforaminal epidural  injection of steroid (Bilateral, 9/17/2019); Cataract extraction w/  intraocular lens implant (Right, 10/31/2018); Cataract extraction w/  intraocular lens implant (Left, 03/22/2018); Injection of anesthetic agent around nerve (Bilateral, 6/24/2020); Transforaminal epidural injection of steroid (Bilateral, 10/2/2020); Transforaminal epidural injection of steroid (Bilateral, 5/7/2021); Esophagogastroduodenoscopy (N/A, 11/10/2021); Cystoscopy (4/19/2023); Ureteral stent placement (Right, 4/19/2023); Retrograde pyelography (Right, 4/19/2023); Cystoscopy (N/A, 5/12/2023); Ureteroscopy (Right, 5/12/2023); Laser lithotripsy (Right, 5/12/2023); Ureteral stent placement (N/A, 5/12/2023); removal, stent, ureter (Right, 5/12/2023); extraction - stone (Right, 5/12/2023); Pyeloscopy (Right, 5/12/2023); Cystoscopy (N/A, 7/7/2023); Ureteroscopy (Right, 7/7/2023); Pyeloscopy (Right, 7/7/2023); Laser lithotripsy (Right, 7/7/2023); removal, stent, ureter (Right, 7/7/2023); and Ureteral stent placement (Right, 7/7/2023).    Past Surgical History:   Procedure Laterality Date    BELPHAROPTOSIS REPAIR      s/p LAMBERTO levator repair - Dr. Dejesus    CARDIAC CATHETERIZATION      CARPAL TUNNEL RELEASE  3/13/2012    CATARACT EXTRACTION W/  INTRAOCULAR LENS IMPLANT Right 10/31/2018        CATARACT EXTRACTION W/  INTRAOCULAR LENS IMPLANT Left 03/22/2018        CHOLECYSTECTOMY      COLONOSCOPY N/A 11/16/2016    Procedure: COLONOSCOPY;  Surgeon: Chris Storey MD;  Location: [facility] ([address] [place] [address]);  Service: Endoscopy;  Laterality: N/A;    COLONOSCOPY N/A 6/14/2017    Procedure: COLONOSCOPY;  Surgeon: Chris Storey MD;  Location: [facility] ENDO (4TH FLR);  Service: Endoscopy;  Laterality: N/A;  colonoscopy in 3 months with better bowel prep. - Split PEG prep ordered    CYSTOSCOPY  4/19/2023    Procedure: CYSTOSCOPY;  Surgeon: Domo Bates MD;  Location: [facility] OR 1ST FLR;  Service: Urology;;    CYSTOSCOPY N/A 5/12/2023     Procedure: CYSTOSCOPY;  Surgeon: Laz Rudolph Jr., MD;  Location: [facility] OR 1ST FLR;  Service: Urology;  Laterality: N/A;    CYSTOSCOPY N/A 7/7/2023    Procedure: CYSTOSCOPY;  Surgeon: Laz Rudolph Jr., MD;  Location: [facility] OR 1ST FLR;  Service: Urology;  Laterality: N/A;    ESOPHAGOGASTRODUODENOSCOPY N/A 11/10/2021    Procedure: EGD (ESOPHAGOGASTRODUODENOSCOPY);  Surgeon: Kuldeep Velázquez MD;  Location: [facility] ENDO (4TH FLR);  Service: Endoscopy;  Laterality: N/A;  11/4-eliquis hold ok see te -tb-fully vacc-inst mail and verbal-    EXTRACORPOREAL SHOCK WAVE LITHOTRIPSY      EXTRACTION - STONE Right 5/12/2023    Procedure: EXTRACTION - STONE;  Surgeon: Laz Rudolph Jr., MD;  [facility];  Service: Urology;  Laterality: Right;    INJECTION OF ANESTHETIC AGENT AROUND NERVE Bilateral 6/24/2020    Procedure: BLOCK, NERVE, C4-C5-C6 MEDIAL BRANCH ok per Talia to add on;  Surgeon: Oscar Ravi MD;  Location: [facility] PAIN MGT;  Service: Pain Management;  Laterality: Bilateral;    INTRAOCULAR PROSTHESES INSERTION Right 10/31/2018    Procedure: INSERTION-INTRAOCULAR LENS (IOL);  Surgeon: Keysha Valle MD;  Location: [facility];  Service: Ophthalmology;  Laterality: Right;    JOINT REPLACEMENT      LASER LITHOTRIPSY Right 5/12/2023    Procedure: LITHOTRIPSY, USING LASER;  Surgeon: Laz Rudolph Jr., MD;  [facility];  Service: Urology;  Laterality: Right;    LASER LITHOTRIPSY Right 7/7/2023    Procedure: LITHOTRIPSY, USING LASER;  Surgeon: Laz Rudolph Jr., MD;  [facility];  Service: Urology;  Laterality: Right;    mastoid tumor removal  1969    Left mastoidectomy with residual left facial weakness.    NEPHROSTOMY      OOPHORECTOMY      PHACOEMULSIFICATION OF CATARACT Right 10/31/2018    Procedure: PHACOEMULSIFICATION-ASPIRATION-CATARACT;  Surgeon: Keysha Valle MD;  Location: [facility] OR;  Service: Ophthalmology;  Laterality: Right;    PYELOSCOPY Right 5/12/2023     Procedure: PYELOSCOPY;  Surgeon: Laz Rudolph Jr., MD;  Location: [facility] OR 1ST FLR;  Service: Urology;  Laterality: Right;    PYELOSCOPY Right 7/7/2023    Procedure: PYELOSCOPY;  Surgeon: Laz Rudolph Jr., MD;  [facility] OR 1ST FLR;  Service: Urology;  Laterality: Right;    REMOVAL, STENT, URETER Right 5/12/2023    Procedure: REMOVAL, STENT, URETER;  Surgeon: Laz Rudolph Jr., MD;  Location: [facility] OR 1ST FLR;  Service: Urology;  Laterality: Right;    REMOVAL, STENT, URETER Right 7/7/2023    Procedure: REMOVAL, STENT, URETER;  Surgeon: Laz Rudolph Jr., MD;  Location: [facility] OR 1ST FLR;  Service: Urology;  Laterality: Right;    RETROGRADE PYELOGRAPHY Right 4/19/2023    Procedure: PYELOGRAM, RETROGRADE;  Surgeon: Domo Bates MD;  [facility];  Service: Urology;  Laterality: Right;    TOTAL ABDOMINAL HYSTERECTOMY  1969    TAHBSO    TOTAL KNEE ARTHROPLASTY  9/13/2012    Left    TRANSFORAMINAL EPIDURAL INJECTION OF STEROID Bilateral 9/17/2019    Procedure: INJECTION, STEROID, EPIDURAL, TRANSFORAMINAL APPROACH;  Surgeon: Oscar Ravi MD;  Location: [facility] PAIN MGT;  Service: Pain Management;  Laterality: Bilateral;  B/L TFESI L4/L5    TRANSFORAMINAL EPIDURAL INJECTION OF STEROID Bilateral 10/2/2020    Procedure: INJECTION, STEROID, EPIDURAL, TRANSFORAMINAL APPROACH;  Surgeon: Oscar Ravi MD;  Location: [facility] PAIN MGT;  Service: Pain Management;  Laterality: Bilateral;  B/L TFESI L4/5    TRANSFORAMINAL EPIDURAL INJECTION OF STEROID Bilateral 5/7/2021    Procedure: INJECTION, STEROID, EPIDURAL, TRANSFORAMINAL APPROACH, L4-L5 clear to hold Eliquis 3 days;  Surgeon: Oscar Ravi MD;  Location: [facility] PAIN MGT;  Service: Pain Management;  Laterality: Bilateral;    URETERAL STENT PLACEMENT Right 4/19/2023    Procedure: INSERTION, STENT, URETER;  Surgeon: Domo Bates MD;  [facility];  Service: Urology;  Laterality: Right;    URETERAL STENT PLACEMENT N/A 5/12/2023     Procedure: INSERTION, STENT, URETER;  Surgeon: Laz Rudolph Jr., MD;  [facility];  Service: Urology;  Laterality: N/A;    URETERAL STENT PLACEMENT Right 7/7/2023    Procedure: INSERTION, STENT, URETER;  Surgeon: Laz Rudolph Jr., MD;  Location: [facility] OR 1ST FLR;  Service: Urology;  Laterality: Right;    URETEROSCOPY Right 5/12/2023    Procedure: URETEROSCOPY;  Surgeon: Laz Rudolph Jr., MD;  Location: [facility] OR 1ST FLR;  Service: Urology;  Laterality: Right;    URETEROSCOPY Right 7/7/2023    Procedure: URETEROSCOPY;  Surgeon: Laz Rudolph Jr., MD;  [facility];  Service: Urology;  Laterality: Right;        Family History  Her family history includes Alcohol abuse in her brother; Breast cancer in her maternal aunt and sister; Cancer in her mother and sister; Cirrhosis in her brother; Dementia in her brother and brother; Diabetes in her brother, sister, sister, sister, sister, and another family member; Drug abuse in her brother and brother; Frontotemporal dementia in her brother; Heart attack in her father and sister; Heart disease in her father; Hypertension in her mother; Liver cancer in her brother; Lupus in her brother and sister; No Known Problems in her daughter, daughter, maternal grandfather, maternal grandmother, maternal uncle, paternal aunt, paternal grandfather, paternal grandmother, paternal uncle, son, son, and son; Ovarian cancer in an other family member; Sleep apnea in her sister.    Social History  She reports that she quit smoking about 24 years ago. Her smoking use included cigarettes. She started smoking about 39 years ago. She has a 15 pack-year smoking history. She has never used smokeless tobacco. She reports that she does not drink alcohol and does not use drugs.    Allergies  She is allergic to crestor [rosuvastatin], ezetimibe, hydrocodone, lisinopril, sulfa (sulfonamide antibiotics), sulfamethoxazole-trimethoprim, and valsartan.    Medications  She has  "a current medication list which includes the following prescription(s): accu-chek guide test strips, accu-chek soft dev lancets, albuterol, amlodipine, apixaban, atorvastatin, azelastine, benzonatate, carvedilol, cholecalciferol (vitamin d3), dropsafe alcohol prep pads, trulicity, duloxetine, fluticasone propionate, furosemide, hydroxyzine hcl, insulin aspart u-100, lantus solostar u-100 insulin, lancets, magnesium oxide, oxycodone-acetaminophen, oxycodone-acetaminophen, pantoprazole, pen needle, diabetic, ropinirole, triamcinolone acetonide 0.1%, blood-glucose meter, loratadine, and olopatadine.    ROS:  Pertinent positive and negative review of systems as noted in HPI.      Objective:     /65 (BP Location: Right arm, Patient Position: Sitting)   Pulse 73   Ht 5' 8" (1.727 m)   Wt 95 kg (209 lb 7 oz)   LMP  (LMP Unknown)   BMI 31.84 kg/m²    Constitutional: Well appearing, communicating. No acute distress  Voice: Euphonic  Eyes: Conjunctiva WNL, Pupils reactive  Head/Face:   HB III/VI on the left  Achieves full eye closure, synkinesis present    Right Ear:    Auricle normally developed   EAC: normal   Tympanic membrane: intact   Middle Ear: No effusion present and Ossicles in normal position  Left Ear:    Auricle normally developed   EAC: Mastoid bowl clean, no evidence of cholesteatoma   Neck   Left postauricular scar in continuity with left neck incisional scar   No masses on palation  Neuro/Psychiatric:     Affect: Appropriate   Cognition: Impaired   Eyes: EOMI intact  Respiratory: No increased WOB, no stridor       Data Review:   LABS    I have independently reviewed the lab results shown above. Findings significant for the conditions being treated include: none    IMAGING    No pertinent imaging available    AUDIO    I have independently reviewed the following audiogram and reviewed the report. Findings as follows:     Pure Tone Audiometry: Asymmetric  Right - Mild (26-40 dB) to Severe (71-90 dB) " sensorineural hearing loss  Left - Profound (>90 dB) to Profound (>90 dB)  deafness    Tympanometry  Right: Type A  Left:     Word Discrimination Score  Right: 88%  Left     Procedures:       Assessment:     1. Asymmetrical hearing loss    2. History of left mastoidectomy      Plan:     I had a long discussion with the patient regarding her condition and the further workup and management options.  Patient has a history a left mastoidectomy and neck dissection for an unknown tumor many years ago a [facility].  No residual hearing on the left side.  Would be a candidate for [employer] hearing aids.  We will place referral.  Return to clinic in 6-12 months for cleaning.    Voice recognition software was used in the creation of this note/communication and any sound-alike errors which may have occurred from its use should be taken in context when interpreting. If such errors prevent a clear understanding of the note/communication, please contact the office for clarification.   Problem List Items Addressed This Visit    None  Visit Diagnoses         Asymmetrical hearing loss    -  Primary      History of left mastoidectomy

## 2025-03-11 ENCOUNTER — LAB VISIT (OUTPATIENT)
Dept: LAB | Facility: HOSPITAL | Age: 85
End: 2025-03-11
Attending: INTERNAL MEDICINE
Payer: MEDICARE

## 2025-03-11 ENCOUNTER — OFFICE VISIT (OUTPATIENT)
Dept: INTERNAL MEDICINE | Facility: CLINIC | Age: 85
End: 2025-03-11
Payer: MEDICARE

## 2025-03-11 VITALS
HEART RATE: 71 BPM | HEIGHT: 68 IN | SYSTOLIC BLOOD PRESSURE: 139 MMHG | DIASTOLIC BLOOD PRESSURE: 66 MMHG | WEIGHT: 211.75 LBS | BODY MASS INDEX: 32.09 KG/M2
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
## 2025-03-11 DIAGNOSIS — Z79.4 TYPE 2 DIABETES MELLITUS WITH DIABETIC POLYNEUROPATHY, WITH LONG-TERM CURRENT USE OF INSULIN: ICD-10-CM

## 2025-03-11 DIAGNOSIS — K21.9 GASTROESOPHAGEAL REFLUX DISEASE, UNSPECIFIED WHETHER ESOPHAGITIS PRESENT: ICD-10-CM

## 2025-03-11 DIAGNOSIS — I10 ESSENTIAL HYPERTENSION: ICD-10-CM

## 2025-03-11 DIAGNOSIS — E11.42 TYPE 2 DIABETES MELLITUS WITH DIABETIC POLYNEUROPATHY, WITH LONG-TERM CURRENT USE OF INSULIN: ICD-10-CM

## 2025-03-11 DIAGNOSIS — R09.82 POSTNASAL DRIP: ICD-10-CM

## 2025-03-11 DIAGNOSIS — R41.3 MEMORY LOSS: ICD-10-CM

## 2025-03-11 DIAGNOSIS — L29.9 ITCHING: ICD-10-CM

## 2025-03-11 DIAGNOSIS — E11.22 TYPE 2 DIABETES MELLITUS WITH STAGE 3 CHRONIC KIDNEY DISEASE, WITH LONG-TERM CURRENT USE OF INSULIN: ICD-10-CM

## 2025-03-11 DIAGNOSIS — E11.42 DIABETIC POLYNEUROPATHY ASSOCIATED WITH TYPE 2 DIABETES MELLITUS: ICD-10-CM

## 2025-03-11 DIAGNOSIS — N18.30 TYPE 2 DIABETES MELLITUS WITH STAGE 3 CHRONIC KIDNEY DISEASE, WITH LONG-TERM CURRENT USE OF INSULIN: ICD-10-CM

## 2025-03-11 DIAGNOSIS — Z79.4 TYPE 2 DIABETES MELLITUS WITH STAGE 3 CHRONIC KIDNEY DISEASE, WITH LONG-TERM CURRENT USE OF INSULIN: ICD-10-CM

## 2025-03-11 LAB
BASOPHILS # BLD AUTO: 0.07 K/UL (ref 0–0.2)
BASOPHILS NFR BLD: 0.6 % (ref 0–1.9)
DIFFERENTIAL METHOD BLD: ABNORMAL
EOSINOPHIL # BLD AUTO: 0.3 K/UL (ref 0–0.5)
EOSINOPHIL NFR BLD: 2.9 % (ref 0–8)
ERYTHROCYTE [DISTWIDTH] IN BLOOD BY AUTOMATED COUNT: 13.9 % (ref 11.5–14.5)
ESTIMATED AVG GLUCOSE: 177 MG/DL (ref 68–131)
HBA1C MFR BLD: 7.8 % (ref 4–5.6)
HCT VFR BLD AUTO: 36.8 % (ref 37–48.5)
HGB BLD-MCNC: 11.6 G/DL (ref 12–16)
IMM GRANULOCYTES # BLD AUTO: 0.06 K/UL (ref 0–0.04)
IMM GRANULOCYTES NFR BLD AUTO: 0.5 % (ref 0–0.5)
LYMPHOCYTES # BLD AUTO: 2.8 K/UL (ref 1–4.8)
LYMPHOCYTES NFR BLD: 24.8 % (ref 18–48)
MCH RBC QN AUTO: 27.8 PG (ref 27–31)
MCHC RBC AUTO-ENTMCNC: 31.5 G/DL (ref 32–36)
MCV RBC AUTO: 88 FL (ref 82–98)
MONOCYTES # BLD AUTO: 1 K/UL (ref 0.3–1)
MONOCYTES NFR BLD: 8.8 % (ref 4–15)
NEUTROPHILS # BLD AUTO: 7.1 K/UL (ref 1.8–7.7)
NEUTROPHILS NFR BLD: 62.4 % (ref 38–73)
NRBC BLD-RTO: 0 /100 WBC
PLATELET # BLD AUTO: 187 K/UL (ref 150–450)
PMV BLD AUTO: 11.5 FL (ref 9.2–12.9)
RBC # BLD AUTO: 4.18 M/UL (ref 4–5.4)
WBC # BLD AUTO: 11.39 K/UL (ref 3.9–12.7)

## 2025-03-11 PROCEDURE — 99999 PR PBB SHADOW E&M-EST. PATIENT-LVL V: CPT | Mod: PBBFAC,HCNC,, | Performed by: INTERNAL MEDICINE

## 2025-03-11 PROCEDURE — 84443 ASSAY THYROID STIM HORMONE: CPT | Mod: HCNC | Performed by: INTERNAL MEDICINE

## 2025-03-11 PROCEDURE — 83036 HEMOGLOBIN GLYCOSYLATED A1C: CPT | Mod: HCNC | Performed by: INTERNAL MEDICINE

## 2025-03-11 PROCEDURE — 85025 COMPLETE CBC W/AUTO DIFF WBC: CPT | Mod: HCNC | Performed by: INTERNAL MEDICINE

## 2025-03-11 PROCEDURE — 80053 COMPREHEN METABOLIC PANEL: CPT | Mod: HCNC | Performed by: INTERNAL MEDICINE

## 2025-03-11 PROCEDURE — 36415 COLL VENOUS BLD VENIPUNCTURE: CPT | Mod: HCNC | Performed by: INTERNAL MEDICINE

## 2025-03-11 RX ORDER — INSULIN ASPART 100 [IU]/ML
INJECTION, SOLUTION INTRAVENOUS; SUBCUTANEOUS
Qty: 9 ML | Refills: 3 | Status: SHIPPED | OUTPATIENT
Start: 2025-03-11

## 2025-03-11 RX ORDER — HYDROXYZINE HYDROCHLORIDE 10 MG/1
10 TABLET, FILM COATED ORAL 3 TIMES DAILY PRN
Qty: 60 TABLET | Refills: 0 | Status: SHIPPED | OUTPATIENT
Start: 2025-03-11

## 2025-03-11 RX ORDER — INSULIN GLARGINE 100 [IU]/ML
20 INJECTION, SOLUTION SUBCUTANEOUS DAILY
Qty: 15 ML | Refills: 3 | Status: SHIPPED | OUTPATIENT
Start: 2025-03-11

## 2025-03-11 RX ORDER — PANTOPRAZOLE SODIUM 40 MG/1
40 TABLET, DELAYED RELEASE ORAL DAILY
Qty: 90 TABLET | Refills: 1 | Status: SHIPPED | OUTPATIENT
Start: 2025-03-11

## 2025-03-11 RX ORDER — LORATADINE 10 MG/1
10 TABLET ORAL DAILY PRN
Qty: 90 TABLET | Refills: 1 | Status: SHIPPED | OUTPATIENT
Start: 2025-03-11 | End: 2026-03-11

## 2025-03-11 NOTE — PROGRESS NOTES
CHIEF COMPLAINT:   Follow up of multple problems.      HISTORY OF PRESENT ILLNESS: This is a 85-year-old woman who presents for follow up of above     Her daughter,  Heike Kimbrough is here with her today.  Her daughter is concerned about Mrs Kimbrough living alone. Mrs Kimbrough memory is not as good as it used to be. Someone has been staying with Mrs kimbrough the last 4-6 weeks. Her daughter will be staying until the end of the month then has to go back to [place].  Daughter would like resources to help her   Mother    She fell 1/19/25 in the street and had a bruise on the left forehead that has since resolved.     She brings her medication bottles with her today and has the correct medications. She does not have her insulins, pantoprazole or oxycodone (she is afraid to bring the oxycodone with her)      She is taking Lantus 20 units once in the evening.  She takes novolog 10 units with meals.  SHe denies any hypoglycemia    She continues to have itching on her arms and neck. Comes and goes.       She continues to have neck and shoulder pain, back pain. She complains of back pain and body pain  which has been severe. She is taking oxycodone apap 10/325 up to 5 times daily.   She continues to take duloxetine 60 mg twice daily.  Her mood is doing ok.        She saw Dr Reilly on 1/18/22 - she continues to take Eliquis 5 mg twice daily.  She had cardiac PET stress 2/9/2022 which was negative for ischemia          No fever or chills. She has felt cold.  No nausea, vomiting, constipation, dysuria, hematuira.  She denies swallowing problems today.         She is taking amlodipine 5 mg once daily, coreg 25 mg twice daily for her hypertension and diastolic dysfunction.     She is taking requip 0.5  mg at bedtime - for helping the restless leg     She had a right ureteroscopy with laser lithotripsy and stone basketing on 5/12/23.  Her pain from the stone has resolved.  She still has a urinary stent.         PAST MEDICAL HISTORY:   1.  "Diabetes mellitus   2. Hypertension   3. Hyperlipidemia   4. Left heart catheterization January 2007 which revealed luminal irregularities in the LAD, left circumflex and right coronary artery. She had diastolic dysfunction and patent renal arteries.   5. Sleep apnea   6. Obesity.   7. Nephrolithiasis status post lithotripsy.   8. Reflux - had nonerosive gastropathy on EGD September 2007. Gastritis on EGD 9/2010  9. Hidradenitis suppurativa.   10. History of diverticulitis with a hospitalized October 2007.   11. Migraines   12. Obesity.   13. 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    14. Total hysterectomy in 1969.   15. Cholecystectomy was done at that time as well.   16. 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   17. Colon polyps on colonscopy 11/2010 - due 2013   18. Hospitalization 12/10 for e coli sepsis due to left ureteral stone with left nephrostomy tube in [place], [place]   19. Left knee replacement 9/2012      MEDICATIONS and ALLERGIES: Updated on EPIC.     PHYSICAL EXAMINATION:     /66 (Patient Position: Sitting)   Pulse 71   Ht 5' 8" (1.727 m)   Wt 96.1 kg (211 lb 12 oz)   LMP  (LMP Unknown)   BMI 32.20 kg/m²       GENERAL: She is alert, oriented, no apparent distress. Affect within   normal limits.  Conjunctiva anicteric.  OP - possible thrush on tongue  NECK: Supple.   Respiratory: Effort normal. Lungs clear  HEART: Regular rate and rhythm without murmurs, gallops or rubs.   No lower extremity edema.      Labs 1/19/25 reviewed       ASSESSMENT AND PLAN:       1. Diabetes -labs  2.  Kidney stones - asx  3. Hypertension with diastolic heart failure- stable. Negative PET stress.    4.  OA neck and lumbar spine - on oxycodone. Stable.  Due to recent fall in mobility issues- home health physical therapy and  and   5.  Gout - asx off allopurinol  6.  Mood disorder -  on duloxetine 60 mg twice " daily  7.  Swallowing disorder - s/p EGD with dilation on 11/11/21.   8. Asthma - stable   9. Gerd - get on pantoprazole 40 mg  daily   10. Hyperlipidemia -on atorvastatin  11. Allergic rhinitis - stable  12. CRI -stable  13. Obesity - discussed diet, exercise and weight loss  14. CAD -risk factor modification  15. Aortic atherosclerosis and possible mesenteric artery stenosis- risk factor modification  16. Tortuous aorta - HTN controlled  17. Colon polyps - Colonoscopy in 8/2013 - 2 polyps. Flex sig 11/2013 - mild granular mucosa. Colonoscopy 6/2017 diverticulosis - no polyps due 2023. Defer due to age.   18. hyperparathryodisism -saw nephrology   19. .Restless leg syndrome - on roprinole    20. Stage 3b kidney disease -stable  21.  Morbid obesity - working at diet, exercise and weight loss  22. Memory loss- will work on supports from home.       MMG 5/9/24        I will see her back in 2 months, sooner if problems arise     Visit today included increased complexity associated with the care of the episodic problems- shoulder pain, gait abnormality,  addressed and managing the longitudinal care of the patient due to the serious and/or complex managed problem(s) diabetes, hypertension, chronic back pain, gout, kidney disease, restless leg synrome, memory loss

## 2025-03-12 LAB
ALBUMIN SERPL BCP-MCNC: 3.3 G/DL (ref 3.5–5.2)
ALP SERPL-CCNC: 85 U/L (ref 40–150)
ALT SERPL W/O P-5'-P-CCNC: 9 U/L (ref 10–44)
ANION GAP SERPL CALC-SCNC: 8 MMOL/L (ref 8–16)
AST SERPL-CCNC: 20 U/L (ref 10–40)
BILIRUB SERPL-MCNC: 0.5 MG/DL (ref 0.1–1)
BUN SERPL-MCNC: 14 MG/DL (ref 8–23)
CALCIUM SERPL-MCNC: 9.9 MG/DL (ref 8.7–10.5)
CHLORIDE SERPL-SCNC: 105 MMOL/L (ref 95–110)
CO2 SERPL-SCNC: 29 MMOL/L (ref 23–29)
CREAT SERPL-MCNC: 1.1 MG/DL (ref 0.5–1.4)
EST. GFR  (NO RACE VARIABLE): 49.2 ML/MIN/1.73 M^2
GLUCOSE SERPL-MCNC: 96 MG/DL (ref 70–110)
POTASSIUM SERPL-SCNC: 4.4 MMOL/L (ref 3.5–5.1)
PROT SERPL-MCNC: 7.5 G/DL (ref 6–8.4)
SODIUM SERPL-SCNC: 142 MMOL/L (ref 136–145)
TSH SERPL DL<=0.005 MIU/L-ACNC: 1.59 UIU/ML (ref 0.4–4)

## 2025-03-13 ENCOUNTER — NUTRITION (OUTPATIENT)
Dept: NUTRITION | Facility: CLINIC | Age: 85
End: 2025-03-13
Payer: MEDICARE

## 2025-03-13 VITALS — BODY MASS INDEX: 32.21 KG/M2 | WEIGHT: 212.5 LBS | HEIGHT: 68 IN

## 2025-03-13 DIAGNOSIS — Z79.4 TYPE 2 DIABETES MELLITUS WITH DIABETIC POLYNEUROPATHY, WITH LONG-TERM CURRENT USE OF INSULIN: Primary | ICD-10-CM

## 2025-03-13 DIAGNOSIS — E11.42 TYPE 2 DIABETES MELLITUS WITH DIABETIC POLYNEUROPATHY, WITH LONG-TERM CURRENT USE OF INSULIN: Primary | ICD-10-CM

## 2025-03-13 DIAGNOSIS — E66.9 OBESITY (BMI 30-39.9): ICD-10-CM

## 2025-03-13 DIAGNOSIS — Z71.3 DIETARY COUNSELING: ICD-10-CM

## 2025-03-13 DIAGNOSIS — N18.31 STAGE 3A CHRONIC KIDNEY DISEASE: ICD-10-CM

## 2025-03-13 PROCEDURE — 99999 PR PBB SHADOW E&M-EST. PATIENT-LVL IV: CPT | Mod: PBBFAC,HCNC,,

## 2025-03-13 NOTE — PROGRESS NOTES
"Referring Physician:Jeannine Arriaga MD     Reason for visit:  Chief Complaint   Patient presents with    Diabetes    Chronic Kidney Disease    Nutrition Counseling    Obesity      Initial Visit    :1940     Allergies Reviewed  Meds Reviewed    Anthropometrics  Weight:96.4 kg (212 lb 8.4 oz)  Height:5' 8" (1.727 m)  BMI:Body mass index is 32.31 kg/m².   IBW: 63.9 kg  +/-10%    Meds:Medications Prior to Visit[1]    Food/Drug Interactions Noted:  n/a    Vitamins/Supplements/Herbs:  Vit D; Mag-Ox    Labs: HgbA1c  7.8   eGFR  49.2   Cr  1.1   K+  4.4     Nutrition Prescription: 1917 Kcals/day( 30 kcal/kg IBW),  51 g protein( 0.8 g/kg IBW)     Support System:   pt lives alone, and states she does her own grocery shopping and cooking.  Pt's daughter Heike Villasenor, who lives in [facility], accompanied pt to visit today.    Diet Hx:   pt seen by this dietitian 2023 for diabetes.  Pt's daughter was interested in grocery shopping suggestions for patient to make "healthier choices".  Pt states she sometimes "forgets to eat".  Although pt's daughter lives out of town, daughter states pt has other family here to help with grocery shopping and meals.  Snacks on whatever's available in the house.  Pt eats most meals at home.     Breakfast:  raisin bread toast, sausage, coffee with Sweet & Low and whole milk  Lunch: per pt:  Diet Coke and a cookie  Dinner: last night had chinese take-out.  Pt states when she cooks for herself, may fix baked, fried or bbq chicken with sides or crock pot roast with sides.  Likes dessert after dinner; "apple pie is my favorite"    Current activity level and/or physical limitations:   pt still drives.  Using a walking cane in clinic today.    Motivation to make changes/anticipated barriers and/or expected adherence:  no change in current diet regimen anticipated    Nutrition-Focus Physical Findings:  pt appears well nourished    Assessment:  pt and her daughter attentive.  Pt did not seem " interested in making any changes to her current diet regimen.  Pt's daughter requested sample menus for meal planning.  All of daughter's questions were answered, and they verbalized understanding of information.    Nutrition Diagnosis:  obesity RT excess energy intake and physical inactivity AEB BMI 32    Recommendations:  1500 calorie, low fat, high fiber diet.  Handouts provided & reviewed:  My Plate Planner; Diabetes Management Guide/30-45 gm carb per meal plan; 1500 Calorie Sample 5-Day menus    Strategies Implemented:  do not skip meals; daughter/family member accompany pt to grocery to assist with healthy food/beverage choices    Consultation Time:30 minutes.  Communicated with referring healthcare provider:  Consult note available in pt's Epic chart per MD discretion  Follow Up:  Pt provided with dietitian contact information and advised to contact me with questions or make future appointment if further intervention needed.                         [1]   Outpatient Medications Prior to Visit   Medication Sig Dispense Refill    ACCU-CHEK GUIDE TEST STRIPS Strp TEST BLOOD SUGAR THREE TIMES DAILY 300 strip 3    ACCU-CHEK SOFT DEV LANCETS Kit       albuterol (PROVENTIL/VENTOLIN HFA) 90 mcg/actuation inhaler Inhale 2 puffs by mouth into the lungs every 4 (four) hours as needed for Wheezing. Rescue 25.5 g 3    amLODIPine (NORVASC) 5 MG tablet Take 1 tablet (5 mg total) by mouth once daily. 90 tablet 0    apixaban (ELIQUIS) 5 mg Tab Take 1 tablet (5 mg total) by mouth 2 (two) times daily. 60 tablet 0    atorvastatin (LIPITOR) 40 MG tablet Take 1 tablet (40 mg total) by mouth once daily. 90 tablet 3    azelastine (ASTELIN) 137 mcg (0.1 %) nasal spray 2 sprays (274 mcg total) by Nasal route 2 (two) times daily as needed for Rhinitis. 30 mL 0    blood-glucose meter kit Use as instructed 1 each 0    carvediloL (COREG) 25 MG tablet Take 1 tablet (25 mg total) by mouth 2 (two) times daily. 180 tablet 0    cholecalciferol,  "vitamin D3, 125 mcg (5,000 unit) Tab Take 5,000 Units by mouth once daily.      DROPSAFE ALCOHOL PREP PADS PadM USE TOPICALLY TO TEST BLOOD SUGAR FOUR TIMES DAILY 400 each 3    DULoxetine (CYMBALTA) 60 MG capsule Take 1 capsule (60 mg total) by mouth 2 (two) times daily. 180 capsule 3    fluticasone propionate (FLONASE) 50 mcg/actuation nasal spray Instill 2 sprays in each nostril once daily. 16 g 1    hydrOXYzine HCL (ATARAX) 10 MG Tab Take 1 tablet (10 mg total) by mouth 3 (three) times daily as needed (itching). 60 tablet 0    insulin aspart U-100 (NOVOLOG FLEXPEN U-100 INSULIN) 100 unit/mL (3 mL) InPn pen Inject 10 units before meals, hold if sugar is less than 100. Max daily 45 units. 9 mL 3    insulin glargine U-100, Lantus, (LANTUS SOLOSTAR U-100 INSULIN) 100 unit/mL (3 mL) InPn pen Inject 20 Units into the skin once daily. 15 mL 3    lancets (ACCU-CHEK SOFTCLIX LANCETS) Misc TEST BLOOD SUGAR FOUR TIMES DAILY 400 each 3    loratadine (CLARITIN) 10 mg tablet Take 1 tablet (10 mg total) by mouth daily as needed for Allergies. 90 tablet 1    magnesium oxide (MAG-OX) 400 mg (241.3 mg magnesium) tablet Take 1 tablet (400 mg total) by mouth once daily. 100 tablet 4    olopatadine (PATADAY) 0.2 % Drop Place 1 drop into both eyes once daily. (Patient not taking: Reported on 6/14/2024) 2.5 mL 0    oxyCODONE-acetaminophen (PERCOCET)  mg per tablet Take one tablet by mouth 5 times daily 150 tablet 0    oxyCODONE-acetaminophen (PERCOCET)  mg per tablet Take 1 tablet by mouth 5 (five) times daily. 150 tablet 0    pantoprazole (PROTONIX) 40 MG tablet Take 1 tablet (40 mg total) by mouth once daily. 90 tablet 1    pen needle, diabetic (BD ULTRA-FINE OLU PEN NEEDLE) 32 gauge x 5/32" Ndle Uses 4 times daily, on multiple daily insulin injections 130 each 12    rOPINIRole (REQUIP) 0.5 MG tablet Take 1 tablet (0.5 mg total) by mouth every evening. 90 tablet 3    triamcinolone acetonide 0.1% (KENALOG) 0.1 % ointment " Apply topically 2 (two) times daily. Apply to rash on back 30 g 1     No facility-administered medications prior to visit.

## 2025-03-20 ENCOUNTER — PATIENT OUTREACH (OUTPATIENT)
Dept: ADMINISTRATIVE | Facility: OTHER | Age: 85
End: 2025-03-20
Payer: MEDICARE

## 2025-03-23 ENCOUNTER — RESULTS FOLLOW-UP (OUTPATIENT)
Dept: INTERNAL MEDICINE | Facility: CLINIC | Age: 85
End: 2025-03-23

## 2025-03-25 DIAGNOSIS — Z86.718 HISTORY OF DVT (DEEP VEIN THROMBOSIS): ICD-10-CM

## 2025-03-25 DIAGNOSIS — G25.81 RLS (RESTLESS LEGS SYNDROME): ICD-10-CM

## 2025-03-25 RX ORDER — ROPINIROLE 0.5 MG/1
0.5 TABLET, FILM COATED ORAL NIGHTLY
Qty: 90 TABLET | Refills: 3 | Status: SHIPPED | OUTPATIENT
Start: 2025-03-25

## 2025-03-25 RX ORDER — ATORVASTATIN CALCIUM 40 MG/1
40 TABLET, FILM COATED ORAL DAILY
Qty: 90 TABLET | Refills: 1 | Status: SHIPPED | OUTPATIENT
Start: 2025-03-25

## 2025-03-25 NOTE — TELEPHONE ENCOUNTER
Refill Routing Note   Medication(s) are not appropriate for processing by [facility] for the following reason(s):        New or recently adjusted medication    [facility] action(s):  Defer               Appointments  past 12m or future 3m with PCP    Date Provider   Last Visit   3/11/2025 Jeannine Arriaga MD   Next Visit   5/8/2025 Jeannine Arriaga MD   ED visits in past 90 days: 1        Note composed:5:28 PM 03/25/2025

## 2025-03-25 NOTE — TELEPHONE ENCOUNTER
Refill Routing Note   Medication(s) are not appropriate for processing by [facility] for the following reason(s):        Outside of protocol    [facility] action(s):  Route             Appointments  past 12m or future 3m with PCP    Date Provider   Last Visit   3/11/2025 Jeannine Arriaga MD   Next Visit   3/25/2025 Jeannine Arriaga MD   ED visits in past 90 days: 1        Note composed:2:20 PM 03/25/2025

## 2025-03-25 NOTE — TELEPHONE ENCOUNTER
Provider Staff:  Action required for this patient    Requires labs      Please see care gap opportunities below in Care Due Message.    Thanks!  [facility]     Appointments      Date Provider   Last Visit   3/11/2025 Jeannine Arriaga MD   Next Visit   3/25/2025 Jeannine Arriaga MD     Refill Decision Note   Henrietta Villasenor  is requesting a refill authorization.    Brief Assessment and Rationale for Refill:  Approve       Medication Therapy Plan:  Overridden by Jeannine Arriaga MD on Jan 22, 2025 6:10 PM      Alert overridden per protocol: Yes   Comments:     Note composed:6:26 PM 03/25/2025

## 2025-03-25 NOTE — TELEPHONE ENCOUNTER
No care due was identified.  [facility] Embedded Care Due Messages. Reference number: [id number].   3/25/2025 11:34:34 AM CDT

## 2025-03-25 NOTE — TELEPHONE ENCOUNTER
Care Due:                  Date            Visit Type   Department     Provider  --------------------------------------------------------------------------------                                EP -                              PRIMARY      [facility] INTERNAL  Last Visit: 03-      CARE ([facility])   MEDICINE       FEDERICO MARCIAL                              EP -                              PRIMARY      [facility] INTERNAL  Next Visit: 05-      CARE ([facility])   MEDICINE       FEDERICO MARCIAL                                                            Last  Test          Frequency    Reason                     Performed    Due Date  --------------------------------------------------------------------------------    Mg Level....  12 months..  magnesium................  01- 01-    [employer] Embedded Care Due Messages. Reference number: [id number].   3/25/2025 11:26:08 AM CDT

## 2025-03-27 DIAGNOSIS — G89.4 CHRONIC PAIN SYNDROME: ICD-10-CM

## 2025-03-27 RX ORDER — OXYCODONE AND ACETAMINOPHEN 10; 325 MG/1; MG/1
1 TABLET ORAL
Qty: 150 TABLET | Refills: 0 | Status: SHIPPED | OUTPATIENT
Start: 2025-03-27

## 2025-03-27 NOTE — TELEPHONE ENCOUNTER
No care due was identified.  [facility] Embedded Care Due Messages. Reference number: [id number].   3/27/2025 9:55:27 AM CDT

## 2025-03-28 NOTE — PROGRESS NOTES
CHW - Outreach Attempt    Tierra Rosenberg Community Health Worker left a voicemail message for 1st attempt to contact patient regarding: [facility]   Community Health Worker to attempt to contact patient on:  April 3, 2025

## 2025-03-31 ENCOUNTER — PATIENT MESSAGE (OUTPATIENT)
Dept: INTERNAL MEDICINE | Facility: CLINIC | Age: 85
End: 2025-03-31
Payer: MEDICARE

## 2025-04-08 ENCOUNTER — PATIENT OUTREACH (OUTPATIENT)
Dept: ADMINISTRATIVE | Facility: OTHER | Age: 85
End: 2025-04-08
Payer: MEDICARE

## 2025-04-22 NOTE — PROGRESS NOTES
CHW - Initial Contact    Tierra Rosenberg, This Community Health Worker updated the Social Determinant of Health questionnaire with patient via telephone today.    Pt identified barriers of most importance are: food, utilities and rental assistance.    Referrals to community agencies completed with patient/caregiver consent outside of [facility] include: CHW referred Pt to [employer] for utility, food  and rental assistance.  CHW also referred Ms. Villasenor to the [employer] program for food assistance.  CHW referred Pt to the [place] [employer] program.   Referrals were put through [facility] - no:   Other information discussed the patient needs / wants help with:  None at this time; however, CHW called her daughter at [phone] and left a message.      Follow-up Outreach - Due: 4/29/2025

## 2025-04-28 DIAGNOSIS — G89.4 CHRONIC PAIN SYNDROME: ICD-10-CM

## 2025-04-28 RX ORDER — OXYCODONE AND ACETAMINOPHEN 10; 325 MG/1; MG/1
1 TABLET ORAL
Qty: 150 TABLET | Refills: 0 | Status: SHIPPED | OUTPATIENT
Start: 2025-04-28

## 2025-04-28 NOTE — TELEPHONE ENCOUNTER
No care due was identified.  [facility] Embedded Care Due Messages. Reference number: [id number].   4/28/2025 8:08:30 AM CDT

## 2025-04-29 ENCOUNTER — PATIENT OUTREACH (OUTPATIENT)
Dept: ADMINISTRATIVE | Facility: OTHER | Age: 85
End: 2025-04-29
Payer: MEDICARE

## 2025-04-29 NOTE — PROGRESS NOTES
CHW - Outreach Attempt    Tierra Rosenberg Community Health Worker left a voicemail message for 2nd attempt to contact patient regarding: [facility]   Community Health Worker to attempt to contact patient on: May 5, 2025.

## 2025-05-05 ENCOUNTER — PATIENT OUTREACH (OUTPATIENT)
Dept: ADMINISTRATIVE | Facility: OTHER | Age: 85
End: 2025-05-05
Payer: MEDICARE

## 2025-05-05 NOTE — PROGRESS NOTES
CHW - Follow Up    Tierra Rosenberg, Community Health Worker completed a follow up visit with patient via telephone today.  Pt/Caregiver reported: Ms. Villasenor stated that she had her grand daughter to complete the community resource information that CHW mailed her.    Community Health Worker provided: CHW will follow up with Pt regarding the [employer] Information.   Follow-up Outreach - Due: 5/14/2025

## 2025-05-07 ENCOUNTER — TELEPHONE (OUTPATIENT)
Dept: INTERNAL MEDICINE | Facility: CLINIC | Age: 85
End: 2025-05-07
Payer: MEDICARE

## 2025-05-08 ENCOUNTER — OFFICE VISIT (OUTPATIENT)
Dept: INTERNAL MEDICINE | Facility: CLINIC | Age: 85
End: 2025-05-08
Payer: MEDICARE

## 2025-05-08 VITALS
HEIGHT: 68 IN | HEART RATE: 67 BPM | SYSTOLIC BLOOD PRESSURE: 128 MMHG | WEIGHT: 213.63 LBS | BODY MASS INDEX: 32.38 KG/M2 | DIASTOLIC BLOOD PRESSURE: 60 MMHG | OXYGEN SATURATION: 98 %

## 2025-05-08 DIAGNOSIS — N18.32 CHRONIC KIDNEY DISEASE, STAGE 3B: ICD-10-CM

## 2025-05-08 DIAGNOSIS — Z12.31 SCREENING MAMMOGRAM FOR BREAST CANCER: ICD-10-CM

## 2025-05-08 DIAGNOSIS — I70.0 AORTIC ATHEROSCLEROSIS: Primary | ICD-10-CM

## 2025-05-08 DIAGNOSIS — M54.89 OTHER CHRONIC BACK PAIN: Chronic | ICD-10-CM

## 2025-05-08 DIAGNOSIS — I25.119 CORONARY ARTERY DISEASE INVOLVING NATIVE CORONARY ARTERY OF NATIVE HEART WITH ANGINA PECTORIS: ICD-10-CM

## 2025-05-08 DIAGNOSIS — K21.9 GASTROESOPHAGEAL REFLUX DISEASE WITHOUT ESOPHAGITIS: ICD-10-CM

## 2025-05-08 DIAGNOSIS — I50.32 CHRONIC DIASTOLIC HEART FAILURE: ICD-10-CM

## 2025-05-08 DIAGNOSIS — E78.2 MIXED HYPERLIPIDEMIA: ICD-10-CM

## 2025-05-08 DIAGNOSIS — I10 ESSENTIAL HYPERTENSION: Chronic | ICD-10-CM

## 2025-05-08 DIAGNOSIS — G89.29 OTHER CHRONIC BACK PAIN: Chronic | ICD-10-CM

## 2025-05-08 DIAGNOSIS — G47.33 OSA ON CPAP: Chronic | ICD-10-CM

## 2025-05-08 PROCEDURE — 99999 PR PBB SHADOW E&M-EST. PATIENT-LVL IV: CPT | Mod: PBBFAC,HCNC,, | Performed by: INTERNAL MEDICINE

## 2025-05-08 RX ORDER — FUROSEMIDE 20 MG/1
20 TABLET ORAL DAILY PRN
Qty: 30 TABLET | Refills: 1 | Status: SHIPPED | OUTPATIENT
Start: 2025-05-08 | End: 2026-05-08

## 2025-05-08 RX ORDER — CARVEDILOL 25 MG/1
25 TABLET ORAL 2 TIMES DAILY
Qty: 180 TABLET | Refills: 0 | Status: SHIPPED | OUTPATIENT
Start: 2025-05-08

## 2025-05-08 RX ORDER — AMLODIPINE BESYLATE 5 MG/1
5 TABLET ORAL DAILY
Qty: 90 TABLET | Refills: 0 | Status: SHIPPED | OUTPATIENT
Start: 2025-05-08 | End: 2026-05-08

## 2025-05-08 NOTE — PROGRESS NOTES
CHIEF COMPLAINT:   Follow up of multple problems.      HISTORY OF PRESENT ILLNESS: This is a 85-year-old woman who presents for follow up of above     Her granddaughter (age 38) staying with her for a month.     Her daughter, April, stayed with her for a month.      She brings her medication bottles with her today and has the correct medications. She does not have her insulins or oxycodone (she is afraid to bring the oxycodone with her)      She is taking Lantus 20 units once in the evening.  She takes novolog 10 units with meals.  SHe denies any hypoglycemia    She continues to have itching on her arms and neck. Comes and goes.  She takes hydroxyzine 10 mg three times daily as needed for itching which helps.       She continues to have neck and shoulder pain, back pain. She complains of back pain and body pain  which has been severe. She is taking oxycodone apap 10/325 up to 5 times daily.   She continues to take duloxetine 60 mg twice daily.  Her mood is doing ok.        She saw Dr Reilly on 1/18/22 - she continues to take Eliquis 5 mg twice daily.  She had cardiac PET stress 2/9/2022 which was negative for ischemia          No fever or chills. She has felt cold.  No nausea, vomiting, constipation, dysuria, hematuira.  She denies swallowing problems today.         She is taking amlodipine 5 mg once daily, coreg 25 mg twice daily for her hypertension and diastolic dysfunction.     She is taking requip 0.5  mg at bedtime - for helping the restless leg     She had a right ureteroscopy with laser lithotripsy and stone basketing on 5/12/23.  Her pain from the stone has resolved.  She still has a urinary stent.         PAST MEDICAL HISTORY:   1. Diabetes mellitus   2. Hypertension   3. Hyperlipidemia   4. Left heart catheterization January 2007 which revealed luminal irregularities in the LAD, left circumflex and right coronary artery. She had diastolic dysfunction and patent renal arteries.   5. Sleep apnea   6.  "Obesity.   7. Nephrolithiasis status post lithotripsy.   8. Reflux - had nonerosive gastropathy on EGD September 2007. Gastritis on EGD 9/2010  9. Hidradenitis suppurativa.   10. History of diverticulitis with a hospitalized October 2007.   11. Migraines   12. Obesity.   13. 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    14. Total hysterectomy in 1969.   15. Cholecystectomy was done at that time as well.   16. 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   17. Colon polyps on colonscopy 11/2010 - due 2013   18. Hospitalization 12/10 for e coli sepsis due to left ureteral stone with left nephrostomy tube in [place], [place]   19. Left knee replacement 9/2012      MEDICATIONS and ALLERGIES: Updated on EPIC.     PHYSICAL EXAMINATION:   /60 (BP Location: Right arm, Patient Position: Sitting)   Pulse 67   Ht 5' 8" (1.727 m)   Wt 96.9 kg (213 lb 10 oz)   LMP  (LMP Unknown)   SpO2 98%   BMI 32.48 kg/m²         GENERAL: She is alert, oriented, no apparent distress. Affect within   normal limits.  Conjunctiva anicteric.  OP - possible thrush on tongue  NECK: Supple.   Respiratory: Effort normal. Lungs clear  HEART: Regular rate and rhythm without murmurs, gallops or rubs.   No lower extremity edema.      Labs 3/11/25 reviewed       ASSESSMENT AND PLAN:       1. Diabetes -stable. Check blood sugars  2.  Kidney stones - asx  3. Hypertension with diastolic heart failure- stable. Negative PET stress.    4.  OA neck and lumbar spine - on oxycodone. Stable.  5.  Gout - asx off allopurinol  6.  Mood disorder -  on duloxetine 60 mg twice daily  7.  Swallowing disorder - s/p EGD with dilation on 11/11/21.   8. Asthma - stable   9. Gerd -  on pantoprazole 40 mg  daily   10. Hyperlipidemia -on atorvastatin  11. Allergic rhinitis - stable  12. CRI -stable  13. Obesity - discussed diet, exercise and weight loss  14. CAD -risk factor modification  15. Aortic " atherosclerosis and possible mesenteric artery stenosis- risk factor modification  16. Tortuous aorta - HTN controlled  17. Colon polyps - Colonoscopy in 8/2013 - 2 polyps. Flex sig 11/2013 - mild granular mucosa. Colonoscopy 6/2017 diverticulosis - no polyps due 2023. Defer due to age.   18. hyperparathryodisism -saw nephrology   19. .Restless leg syndrome - on roprinole    20. Stage 3b kidney disease -stable  21.  Morbid obesity - working at diet, exercise and weight loss  22. Memory loss- will work on supports from home.       MMG 5/9/24 - scheduled        I will see her back in 4 months, sooner if problems arise     Visit today included increased complexity associated with the care of the episodic problems- shoulder pain, gait abnormality,  addressed and managing the longitudinal care of the patient due to the serious and/or complex managed problem(s) diabetes, hypertension, chronic back pain, gout, kidney disease, restless leg synrome, memory loss

## 2025-05-15 ENCOUNTER — PATIENT OUTREACH (OUTPATIENT)
Dept: ADMINISTRATIVE | Facility: OTHER | Age: 85
End: 2025-05-15
Payer: MEDICARE

## 2025-05-19 NOTE — PROGRESS NOTES
CHW - Outreach Attempt    Tierra Rosenberg Community Health Worker left a voicemail message for 2nd attempt to contact patient regarding: [facility]   Community Health Worker to attempt to contact patient on:  May 21.

## 2025-05-22 ENCOUNTER — PATIENT OUTREACH (OUTPATIENT)
Dept: ADMINISTRATIVE | Facility: OTHER | Age: 85
End: 2025-05-22
Payer: MEDICARE

## 2025-05-23 DIAGNOSIS — G89.4 CHRONIC PAIN SYNDROME: ICD-10-CM

## 2025-05-23 RX ORDER — OXYCODONE AND ACETAMINOPHEN 10; 325 MG/1; MG/1
1 TABLET ORAL
Qty: 150 TABLET | Refills: 0 | Status: SHIPPED | OUTPATIENT
Start: 2025-05-23

## 2025-05-23 NOTE — TELEPHONE ENCOUNTER
No care due was identified.  [facility] Embedded Care Due Messages. Reference number: [id number].   5/23/2025 12:32:17 PM CDT

## 2025-05-27 NOTE — PROGRESS NOTES
Outpatient Care Management  Plan of Care Follow Up Visit    Patient: Henrietta Villasenor  MRN: [id number]  Date of Service: 12/07/2020  Completed by: Baljeet Ruano RN  Referral Date: 11/13/2020  Program: Disease Management (Diabetes & COPD)    Reason for Visit   Patient presents with    OPCM RN First Follow-Up Attempt     12/7/2020    Update Plan Of Care     12/14/2020       Brief Summary: Contacted patient to complete follow up for OPCM. Patient reported that she did receive resource materials from this RN CM and has started to review the information provided. Reported that she has even placed the diabetic grocery list in her purse to use as a reference when she goes shopping. Patient reported that she continues to check her BG as directed, with her readings in the 100s-280s, denies any readings below 100 or above 300.  Patient continues to experience pain in her left leg, which is believed to vascular related. Patient reported that she does have an appt tomorrow for further evaluation.  Stated that she is taking her pain medication as directed with minimal relief. Reported that her pain is causing difficulty with ambulation, requesting rollator for assistance. Will send message to PCP with request for rollator. Med rec completed with encounter with no discrepancies noted. Patient appreciative of outreach.  Encouraged patient to call this RN if having any questions/concerns. Discussed completing F/U call with patient, who is in agreement with call in 2 weeks.          Next Steps:   Follow up on message to PCP ( requesting rollator)  Review BG readings  Continue education on Diabetes and CHF    Patient Summary     Involvement of Care:  Do I have permission to speak with other family members about your care?   yes, Kacie okeefe    Patient Reported Labs & Vitals:  1.  Any Patient Reported Labs & Vitals?     2.  Patient Reported Blood Pressure:     3.  Patient Reported Pulse:     4.  Patient Reported Weight (Kg):      5.  Patient Reported Blood Glucose (mg/dl):    100s, 200s-280s    Medical and social history was reviewed with patient and/or caregiver.     Clinical Assessment     Reviewed and provided basic information on available community resources for mental health, transportation, wellness resources, and palliative care programs with patient and/or caregiver.     Complex Care Plan     Care plan was discussed and completed today with input from patient and/or caregiver.    Patient Instructions     Instructions were provided via the [employer] patient Invens[employer]ure and are available for the patient to view on the patient portal.      Follow up in about 2 weeks (around 12/28/2020) for RN Follow UP.    Todays OPCM Self-Management Care Plan was developed with the patients/caregivers input and was based on identified barriers from todays assessment.  Goals were written today with the patient/caregiver and the patient has agreed to work towards these goals to improve his/her overall well-being. Patient verbalized understanding of the care plan, goals, and all of today's instructions. Encouraged patient/caregiver to communicate with his/her physician and health care team about health conditions and the treatment plan.  Provided my contact information today and encouraged patient/caregiver to call me with any questions as needed.     (1) Mild-to-moderate sensory loss; patient feels pinprick is less sharp or is dull on the affected side; or there is a loss of superficial pain with pinprick, but patient is aware of being touched

## 2025-05-30 ENCOUNTER — HOSPITAL ENCOUNTER (OUTPATIENT)
Dept: RADIOLOGY | Facility: HOSPITAL | Age: 85
Discharge: HOME | End: 2025-05-30
Attending: INTERNAL MEDICINE
Payer: MEDICARE

## 2025-05-30 DIAGNOSIS — Z12.31 SCREENING MAMMOGRAM FOR BREAST CANCER: ICD-10-CM

## 2025-05-30 PROCEDURE — 77067 SCR MAMMO BI INCL CAD: CPT | Mod: 26,HCNC,, | Performed by: RADIOLOGY

## 2025-05-30 PROCEDURE — 77063 BREAST TOMOSYNTHESIS BI: CPT | Mod: TC,HCNC

## 2025-05-30 PROCEDURE — 77063 BREAST TOMOSYNTHESIS BI: CPT | Mod: 26,HCNC,, | Performed by: RADIOLOGY

## 2025-06-05 ENCOUNTER — TELEPHONE (OUTPATIENT)
Dept: PHARMACY | Facility: CLINIC | Age: 85
End: 2025-06-05
Payer: MEDICARE

## 2025-06-18 ENCOUNTER — TELEPHONE (OUTPATIENT)
Dept: INTERNAL MEDICINE | Facility: CLINIC | Age: 85
End: 2025-06-18
Payer: MEDICARE

## 2025-06-19 ENCOUNTER — OFFICE VISIT (OUTPATIENT)
Dept: INTERNAL MEDICINE | Facility: CLINIC | Age: 85
End: 2025-06-19
Payer: MEDICARE

## 2025-06-19 ENCOUNTER — LAB VISIT (OUTPATIENT)
Dept: LAB | Facility: HOSPITAL | Age: 85
End: 2025-06-19
Payer: MEDICARE

## 2025-06-19 VITALS
TEMPERATURE: 98 F | DIASTOLIC BLOOD PRESSURE: 78 MMHG | HEIGHT: 67 IN | WEIGHT: 209.88 LBS | HEART RATE: 78 BPM | RESPIRATION RATE: 18 BRPM | OXYGEN SATURATION: 97 % | BODY MASS INDEX: 32.94 KG/M2 | SYSTOLIC BLOOD PRESSURE: 128 MMHG

## 2025-06-19 DIAGNOSIS — K21.9 GASTROESOPHAGEAL REFLUX DISEASE, UNSPECIFIED WHETHER ESOPHAGITIS PRESENT: ICD-10-CM

## 2025-06-19 DIAGNOSIS — E11.42 TYPE 2 DIABETES MELLITUS WITH DIABETIC POLYNEUROPATHY, WITH LONG-TERM CURRENT USE OF INSULIN: ICD-10-CM

## 2025-06-19 DIAGNOSIS — Z79.4 TYPE 2 DIABETES MELLITUS WITH DIABETIC POLYNEUROPATHY, WITH LONG-TERM CURRENT USE OF INSULIN: ICD-10-CM

## 2025-06-19 DIAGNOSIS — E11.42 DIABETIC POLYNEUROPATHY ASSOCIATED WITH TYPE 2 DIABETES MELLITUS: ICD-10-CM

## 2025-06-19 DIAGNOSIS — Z79.4 TYPE 2 DIABETES MELLITUS WITH DIABETIC POLYNEUROPATHY, WITH LONG-TERM CURRENT USE OF INSULIN: Primary | ICD-10-CM

## 2025-06-19 DIAGNOSIS — E11.42 TYPE 2 DIABETES MELLITUS WITH DIABETIC POLYNEUROPATHY, WITH LONG-TERM CURRENT USE OF INSULIN: Primary | ICD-10-CM

## 2025-06-19 DIAGNOSIS — I10 ESSENTIAL HYPERTENSION: Chronic | ICD-10-CM

## 2025-06-19 LAB
ALBUMIN SERPL BCP-MCNC: 3.8 G/DL (ref 3.5–5.2)
ALP SERPL-CCNC: 105 UNIT/L (ref 40–150)
ALT SERPL W/O P-5'-P-CCNC: 11 UNIT/L (ref 10–44)
ANION GAP (OHS): 13 MMOL/L (ref 8–16)
AST SERPL-CCNC: 17 UNIT/L (ref 11–45)
BILIRUB SERPL-MCNC: 0.4 MG/DL (ref 0.1–1)
BUN SERPL-MCNC: 19 MG/DL (ref 8–23)
CALCIUM SERPL-MCNC: 10.1 MG/DL (ref 8.7–10.5)
CHLORIDE SERPL-SCNC: 101 MMOL/L (ref 95–110)
CO2 SERPL-SCNC: 32 MMOL/L (ref 23–29)
CREAT SERPL-MCNC: 1.4 MG/DL (ref 0.5–1.4)
EAG (OHS): 272 MG/DL (ref 68–131)
GFR SERPLBLD CREATININE-BSD FMLA CKD-EPI: 37 ML/MIN/1.73/M2
GLUCOSE SERPL-MCNC: 232 MG/DL (ref 70–110)
HBA1C MFR BLD: 11.1 % (ref 4–5.6)
POTASSIUM SERPL-SCNC: 4.7 MMOL/L (ref 3.5–5.1)
PROT SERPL-MCNC: 8 GM/DL (ref 6–8.4)
SODIUM SERPL-SCNC: 146 MMOL/L (ref 136–145)

## 2025-06-19 PROCEDURE — 83036 HEMOGLOBIN GLYCOSYLATED A1C: CPT | Mod: HCNC

## 2025-06-19 PROCEDURE — 36415 COLL VENOUS BLD VENIPUNCTURE: CPT | Mod: HCNC

## 2025-06-19 PROCEDURE — 99999 PR PBB SHADOW E&M-EST. PATIENT-LVL V: CPT | Mod: PBBFAC,HCNC,, | Performed by: PHYSICIAN ASSISTANT

## 2025-06-19 PROCEDURE — 80053 COMPREHEN METABOLIC PANEL: CPT | Mod: HCNC

## 2025-06-19 RX ORDER — CARVEDILOL 25 MG/1
25 TABLET ORAL 2 TIMES DAILY
Qty: 180 TABLET | Refills: 0 | Status: SHIPPED | OUTPATIENT
Start: 2025-06-19

## 2025-06-19 RX ORDER — PANTOPRAZOLE SODIUM 40 MG/1
40 TABLET, DELAYED RELEASE ORAL DAILY
Qty: 90 TABLET | Refills: 1 | Status: SHIPPED | OUTPATIENT
Start: 2025-06-19

## 2025-06-19 RX ORDER — PANTOPRAZOLE SODIUM 40 MG/1
40 TABLET, DELAYED RELEASE ORAL DAILY
Qty: 90 TABLET | Refills: 1 | Status: SHIPPED | OUTPATIENT
Start: 2025-06-19 | End: 2025-06-19 | Stop reason: SDUPTHER

## 2025-06-19 RX ORDER — AMLODIPINE BESYLATE 5 MG/1
5 TABLET ORAL DAILY
Qty: 90 TABLET | Refills: 0 | Status: SHIPPED | OUTPATIENT
Start: 2025-06-19 | End: 2026-06-19

## 2025-06-19 RX ORDER — DEXTROSE 4 G
TABLET,CHEWABLE ORAL
Qty: 1 EACH | Refills: 0 | Status: SHIPPED | OUTPATIENT
Start: 2025-06-19

## 2025-06-19 RX ORDER — LANCETS
EACH MISCELLANEOUS
Qty: 100 EACH | Refills: 3 | Status: SHIPPED | OUTPATIENT
Start: 2025-06-19

## 2025-06-19 RX ORDER — INSULIN ASPART 100 [IU]/ML
INJECTION, SOLUTION INTRAVENOUS; SUBCUTANEOUS
Qty: 9 ML | Refills: 3 | Status: SHIPPED | OUTPATIENT
Start: 2025-06-19

## 2025-06-19 NOTE — PROGRESS NOTES
Subjective:       Patient ID: Henrietta Villasenor is a 85 y.o. female.        Chief Complaint: Follow-up    Henrietta Villasenor is an established patient of Jeannine Arriaga MD here today for follow up visit.    Her granddaughter is still living with her.    DM -   No longer on trulicity  Lantus 20 U daily  Novolog 10 U with meals  Not able to check home blood sugar as her glucometer is not working  H/o severe hypoglycemia    Lab Results       Component                Value               Date                       HGBA1C                   7.8 (H)             03/11/2025                 HGBA1C                   8.4 (H)             11/05/2024                 HGBA1C                   7.0 (H)             08/16/2024              HTN -   Carvedilol 25 mg BID  Amlodipine 5 mg daily (reduced from 10 mg daily 8/2024)     Heart failure, CAD -   Lasix 20 mg M/W/F  Farxiga 10 mg added 5/2024 by cardiology but she is no longer taking     H/o intermittent cough, not currently, h/o thrush tx with diflucan     Chronic pain due to OA of neck and lumbar spine tx with oxycodone, also on cymbalta 60 mg BID     RLS tx with requip     She had a right ureteroscopy with laser lithotripsy and stone basketing on 5/12/23     GERD tx with protonix 40 mg     Lipid tx with lipitor 40 mg     H/o DVT tx with eliquis            Review of Systems   Constitutional:  Negative for chills, diaphoresis, fatigue and fever.   HENT:  Negative for congestion and sore throat.    Eyes:  Negative for visual disturbance.   Respiratory:  Negative for cough, chest tightness and shortness of breath.    Cardiovascular:  Negative for chest pain, palpitations and leg swelling.   Gastrointestinal:  Negative for abdominal pain, blood in stool, constipation, diarrhea, nausea and vomiting.   Genitourinary:  Negative for dysuria, frequency, hematuria and urgency.   Musculoskeletal:  Negative for arthralgias and back pain.   Skin:  Negative for rash.   Neurological:  Negative for  dizziness, syncope, weakness and headaches.   Psychiatric/Behavioral:  Negative for dysphoric mood and sleep disturbance. The patient is not nervous/anxious.        Objective:      Physical Exam  Vitals and nursing note reviewed.   Constitutional:       Appearance: Normal appearance. She is well-developed.   HENT:      Head: Normocephalic.      Right Ear: External ear normal.      Left Ear: External ear normal.   Eyes:      Pupils: Pupils are equal, round, and reactive to light.   Cardiovascular:      Rate and Rhythm: Normal rate and regular rhythm.      Heart sounds: Normal heart sounds. No murmur heard.     No friction rub. No gallop.   Pulmonary:      Effort: Pulmonary effort is normal. No respiratory distress.      Breath sounds: Normal breath sounds.   Abdominal:      Palpations: Abdomen is soft.      Tenderness: There is no abdominal tenderness.   Skin:     General: Skin is warm and dry.   Neurological:      Mental Status: She is alert.         Assessment:       1. Type 2 diabetes mellitus with diabetic polyneuropathy, with long-term current use of insulin    2. Essential hypertension    3. Gastroesophageal reflux disease, unspecified whether esophagitis present    4. Diabetic polyneuropathy associated with type 2 diabetes mellitus        Plan:       Henrietta was seen today for follow-up.    Diagnoses and all orders for this visit:    Type 2 diabetes mellitus with diabetic polyneuropathy, with long-term current use of insulin  -     blood-glucose meter kit; To check BG 1 times daily, to use with insurance preferred meter  -     lancets Misc; To check BG 1 times daily, to use with insurance preferred meter  -     blood sugar diagnostic Strp; To check BG 1 times daily, to use with insurance preferred meter  -     Comprehensive Metabolic Panel; Future  -     Hemoglobin A1C; Future  -     Ambulatory referral/consult to Optometry; Future    Essential hypertension  -     carvediloL (COREG) 25 MG tablet; Take 1 tablet  "(25 mg total) by mouth 2 (two) times daily.  -     amLODIPine (NORVASC) 5 MG tablet; Take 1 tablet (5 mg total) by mouth once daily.    Gastroesophageal reflux disease, unspecified whether esophagitis present  -     pantoprazole (PROTONIX) 40 MG tablet; Take 1 tablet (40 mg total) by mouth once daily.    Diabetic polyneuropathy associated with type 2 diabetes mellitus  -     insulin aspart U-100 (NOVOLOG FLEXPEN U-100 INSULIN) 100 unit/mL (3 mL) InPn pen; Inject 10 units before meals, hold if sugar is less than 100. Max daily 45 units.    Labs today  Refill medications as above  Due for eye exam  She defers urine microalbumin until next visit as she cannot urinate right now  She has next visit already scheduled with Dr. Arriaga     Visit today included increased complexity associated with the care of the episodic problem addressed and managing the longitudinal care of the patient due to the serious and/or complex managed problem(s) diabetes, hypertension.    Pt has been given instructions populated from patient instructions database and has verbalized understanding of the after visit summary and information contained wherein.    Follow up with a primary care provider. May go to ER for acute shortness of breath, lightheadedness, fever, or any other emergent complaints or changes in condition.    "This note will be shared with the patient"    Future Appointments   Date Time Provider Department Center   8/29/2025  1:30 PM [facility]   9/11/2025  2:40 PM Dariel Connelly MD [facility]                 "

## 2025-06-19 NOTE — PATIENT INSTRUCTIONS
Ricky Shrestha,     If you are due for any health screening(s) below please notify me so we can arrange them to be ordered and scheduled. Most healthy patients at your age complete them, but you are free to accept or refuse.     If you can't do it, I'll definitely understand. If you can, I'd certainly appreciate it!    All of your core healthy metrics are met.      Your diabetic retinal eye exam is due     Diabetes is the #1 cause of blindness in the [place] - early detection before signs or symptoms develop can prevent debilitating blindness.     Our records indicate that you may be overdue for your annual diabetic eye exam. Eye screening can help identify patients at risk for developing vision loss which is common in diabetes. This simple screening is an important step to keeping you healthy and preventing complications from diabetes.     This recommended diabetic eye exam should take place once per year and can prevent and treat diabetes complications in the eye before developing symptoms. This can be done with a special camera is used to take photographs of the back of your eye without having to dilate them, or you can see an eye doctor for a full dilated exam.     If you recently had your yearly diabetic eye exam performed outside of [facility], please let your Health care team know so that they can update your health record.

## 2025-06-25 DIAGNOSIS — G89.4 CHRONIC PAIN SYNDROME: ICD-10-CM

## 2025-06-25 RX ORDER — OXYCODONE AND ACETAMINOPHEN 10; 325 MG/1; MG/1
1 TABLET ORAL
Qty: 150 TABLET | Refills: 0 | Status: SHIPPED | OUTPATIENT
Start: 2025-06-25

## 2025-06-25 NOTE — TELEPHONE ENCOUNTER
Care Due:                  Date            Visit Type   Department     Provider  --------------------------------------------------------------------------------                                EP -                              PRIMARY      [facility] INTERNAL  Last Visit: 05-      CARE ([facility])   MEDICINE       Jeannine Arriaga                               -                              PRIMARY      [facility] INTERNAL  Next Visit: 08-      CARE ([facility])   MEDICINE       Jeannine Arriaga                                                            Last  Test          Frequency    Reason                     Performed    Due Date  --------------------------------------------------------------------------------    Lipid Panel.  12 months..  atorvastatin.............  09- 09-    Mg Level....  12 months..  magnesium................  01- 01-    [facility] Embedded Care Due Messages. Reference number: [id number].   6/25/2025 8:42:38 AM CDT

## 2025-07-07 ENCOUNTER — RESULTS FOLLOW-UP (OUTPATIENT)
Dept: INTERNAL MEDICINE | Facility: CLINIC | Age: 85
End: 2025-07-07

## 2025-07-07 ENCOUNTER — TELEPHONE (OUTPATIENT)
Dept: INTERNAL MEDICINE | Facility: CLINIC | Age: 85
End: 2025-07-07
Payer: MEDICARE

## 2025-07-07 NOTE — TELEPHONE ENCOUNTER
Attempted to call pt back about glucometer machine but no response at this time. Left VM to call office when available.

## 2025-07-22 DIAGNOSIS — G89.4 CHRONIC PAIN SYNDROME: ICD-10-CM

## 2025-07-22 RX ORDER — OXYCODONE AND ACETAMINOPHEN 10; 325 MG/1; MG/1
1 TABLET ORAL
Qty: 150 TABLET | Refills: 0 | Status: SHIPPED | OUTPATIENT
Start: 2025-07-22

## 2025-07-22 NOTE — TELEPHONE ENCOUNTER
Unable to retrieve patient chart and identify care due.  [facility] Embedded Care Due Messages. Reference number: [id number].   7/22/2025 9:37:12 AM CDT

## 2025-07-24 ENCOUNTER — TELEPHONE (OUTPATIENT)
Dept: INTERNAL MEDICINE | Facility: CLINIC | Age: 85
End: 2025-07-24
Payer: MEDICARE

## 2025-07-24 NOTE — TELEPHONE ENCOUNTER
Copied from CRM #[id number]. Topic: General Inquiry - Patient Advice  >> Jul 24, 2025 11:01 IVANNA Rodriguez wrote:  Type:  Sooner Apoointment Request    Caller is requesting a sooner appointment.  Caller declined first available appointment listed below.  Caller will not accept being placed on the waitlist and is requesting a message be sent to doctor.  Name of Caller:Henrietta  When is the first available appointment?August 29, 2025  Symptoms: itching, bumps , feet swelling  Would the patient rather a call back or a [facility]?  call  Best Call Back Number:   Additional Information:  Patient would like to be seen sooner

## (undated) DEVICE — SET CYSTO IRRIGATION UNIV SPIK

## (undated) DEVICE — SHIELD EYE PLASTIC 3100G

## (undated) DEVICE — UNDERGLOVE BIOGEL PI SZ 6.5 LF

## (undated) DEVICE — URETEROSCOPE LITHOVUE STANDARD

## (undated) DEVICE — SOL IRR WATER STRL 3000 ML

## (undated) DEVICE — GOWN SURGICAL X-LARGE

## (undated) DEVICE — ADAPTER HOSE 10FT 8MM

## (undated) DEVICE — SYR 10CC LUER LOCK

## (undated) DEVICE — FIBER QUANTA OPT SDT 272UM

## (undated) DEVICE — DRESSING LEUKOPLAST FLEX 1X3IN

## (undated) DEVICE — GLOVE BIOGEL SKINSENSE PI 7.0

## (undated) DEVICE — SHIELD COLLAGEN 12HR CORNEAL

## (undated) DEVICE — GOWN X-LG STERILE BACK

## (undated) DEVICE — UNDERGLOVES BIOGEL PI SZ 7 LF

## (undated) DEVICE — SYR ONLY LUER LOCK 20CC

## (undated) DEVICE — SOL BETADINE 5%

## (undated) DEVICE — GUIDE WIRE MOTION .035 X 150CM

## (undated) DEVICE — KIT GREY EYE

## (undated) DEVICE — EXPANDER PUPIL 7.0MM

## (undated) DEVICE — PACK CYSTOSCOPY III SIRUS

## (undated) DEVICE — NDL FLTR 5MCRN BLNT TIP 18GX1

## (undated) DEVICE — UNDERGLOVES BIOGEL PI SIZE 7.5

## (undated) DEVICE — SHEATH FLEXOR URET 10.7FRX35CM

## (undated) DEVICE — SEE MEDLINE ITEM 146372

## (undated) DEVICE — SET IRR URLGY 2LINE UNIV SPIKE

## (undated) DEVICE — GUIDEWIRE STR TIP HIWIRE 150CM

## (undated) DEVICE — GLOVE BIOGEL ECLIPSE SZ 6.5

## (undated) DEVICE — TRAY CYSTO BASIN OMC

## (undated) DEVICE — PACK CYSTO

## (undated) DEVICE — EXTRACTOR TIPLESS 2.4FRX1115CM

## (undated) DEVICE — SOL IRRI STRL WATER 1000ML

## (undated) DEVICE — UNDERGLOVES BIOGEL PI SZ 6 LF

## (undated) DEVICE — DRAPE STERI INCISE MED 130X130

## (undated) DEVICE — SOL WATER STRL IRR 1000ML

## (undated) DEVICE — SOL IRR NACL .9% 3000ML

## (undated) DEVICE — SOL NACL IRR 3000ML

## (undated) DEVICE — DRAPE OPHTHALMIC 48X62 FEN

## (undated) DEVICE — Device

## (undated) DEVICE — CASSETTE INFINITI

## (undated) DEVICE — SYR SLIP TIP 1CC

## (undated) DEVICE — SEE MEDLINE ITEM 157131

## (undated) DEVICE — BAG LINGEMAN DRAIN UROLOGY